# Patient Record
Sex: FEMALE | Race: WHITE | NOT HISPANIC OR LATINO | Employment: UNEMPLOYED | URBAN - METROPOLITAN AREA
[De-identification: names, ages, dates, MRNs, and addresses within clinical notes are randomized per-mention and may not be internally consistent; named-entity substitution may affect disease eponyms.]

---

## 2020-01-16 ENCOUNTER — TELEPHONE (OUTPATIENT)
Dept: FAMILY MEDICINE CLINIC | Facility: CLINIC | Age: 55
End: 2020-01-16

## 2020-01-16 NOTE — TELEPHONE ENCOUNTER
Patient called stating her pharmacy told her that there is a recall on her Ranitidine and this needs to be changed to something else. Please advise

## 2020-02-11 ENCOUNTER — APPOINTMENT (OUTPATIENT)
Dept: CT IMAGING | Facility: HOSPITAL | Age: 55
End: 2020-02-11

## 2020-02-11 ENCOUNTER — HOSPITAL ENCOUNTER (EMERGENCY)
Facility: HOSPITAL | Age: 55
Discharge: HOME | End: 2020-02-11
Attending: EMERGENCY MEDICINE | Admitting: EMERGENCY MEDICINE

## 2020-02-11 VITALS
RESPIRATION RATE: 18 BRPM | WEIGHT: 160 LBS | DIASTOLIC BLOOD PRESSURE: 96 MMHG | SYSTOLIC BLOOD PRESSURE: 151 MMHG | HEART RATE: 86 BPM | OXYGEN SATURATION: 97 % | BODY MASS INDEX: 28.35 KG/M2 | TEMPERATURE: 98.9 F | HEIGHT: 63 IN

## 2020-02-11 DIAGNOSIS — S09.90XA CLOSED HEAD INJURY, INITIAL ENCOUNTER: ICD-10-CM

## 2020-02-11 DIAGNOSIS — S00.83XA CONTUSION OF FOREHEAD, INITIAL ENCOUNTER: Primary | ICD-10-CM
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
## 2020-02-11 PROCEDURE — 70450 CT HEAD/BRAIN W/O DYE: CPT

## 2020-02-11 PROCEDURE — 70486 CT MAXILLOFACIAL W/O DYE: CPT

## 2020-02-11 PROCEDURE — 99282 EMERGENCY DEPT VISIT SF MDM: CPT

## 2020-02-11 PROCEDURE — 72125 CT NECK SPINE W/O DYE: CPT

## 2020-02-11 NOTE — ED NOTES
Patient to er from urgent care with c/o she fell down stairs on Sunday resulting in head injury. No loc reported/ no blood thinners reported. Patient stated she went to urgent care today because increase in swelling and bruising to her face.     Savage Flores, RN  02/11/20 5971

## 2020-02-11 NOTE — ED NOTES
Pt states that she was trying to help mother carry litter down stairs. Pt states that she missed last couple stairs and fell. Pt c/o right knee and facial pain. Reports hitting head on wall. Pt has bilateral periorbital bruising. Pupils perrla. No loc     Marly Avila, RN  02/11/20 4575

## 2020-02-11 NOTE — ED PROVIDER NOTES
EMERGENCY DEPARTMENT ENCOUNTER    Room Number:  42/42  Date of encounter:  2/11/2020  PCP: Vandana Gastelum MD  Historian: Patient      HPI:  Chief Complaint: Fall  A complete HPI/ROS/PMH/PSH/SH/FH are unobtainable due to: None    Context: Holli Patel is a 54 y.o. female who presents to the ED c/o fall.  This occurred on Sunday.  She states that she fell down a couple steps when she tripped.  This is mechanical fall in nature.  She denies any loss of consciousness.  She reports having no associated vomiting or headache.  She states that she has swelling and pain to her forehead.  It is mild.  It is constant.  Improves with time.  She is here today because her friends told her that she should be checked out.  She felt that she was fine.  She then went to urgent care and they referred her to emergency department for CT head imaging.    PAST MEDICAL HISTORY  Active Ambulatory Problems     Diagnosis Date Noted   • No Active Ambulatory Problems     Resolved Ambulatory Problems     Diagnosis Date Noted   • No Resolved Ambulatory Problems     Past Medical History:   Diagnosis Date   • SHAYY (generalized anxiety disorder)    • High blood pressure    • Hyperlipidemia    • Hypothyroidism    • Insomnia    • Migraine    • Rotator cuff tear    • Shoulder pain    • Shoulder pain, right          PAST SURGICAL HISTORY  Past Surgical History:   Procedure Laterality Date   • WRIST SURGERY Right          FAMILY HISTORY  Family History   Problem Relation Age of Onset   • Cancer Father         LYMPHATIC   • Alcohol abuse Brother    • Cancer Other         COLON         SOCIAL HISTORY  Social History     Socioeconomic History   • Marital status: Unknown     Spouse name: Not on file   • Number of children: Not on file   • Years of education: Not on file   • Highest education level: Not on file   Tobacco Use   • Smoking status: Current Every Day Smoker     Packs/day: 1.00     Types: Electronic Cigarette   • Smokeless tobacco: Never Used    Substance and Sexual Activity   • Alcohol use: Never     Frequency: Never   • Drug use: Never         ALLERGIES  Patient has no known allergies.        REVIEW OF SYSTEMS  Review of Systems     All systems reviewed and negative except for those discussed in HPI.       PHYSICAL EXAM    I have reviewed the triage vital signs and nursing notes.    ED Triage Vitals   Temp Heart Rate Resp BP SpO2   02/11/20 1421 02/11/20 1421 02/11/20 1451 02/11/20 1451 02/11/20 1421   98.9 °F (37.2 °C) 93 18 151/96 97 %      Temp src Heart Rate Source Patient Position BP Location FiO2 (%)   02/11/20 1421 -- -- -- --   Tympanic           Physical Exam  GENERAL: not distressed  HENT: nares patent  Bilateral raccoon eyes, no leos sign  No CSF otorrhea or rhinorrhea  EYES: no scleral icterus EOMI  CV: regular rhythm, regular rate  RESPIRATORY: normal effort  ABDOMEN: soft, nontender  MUSCULOSKELETAL: no deformity, no C-spine tenderness, no pain to palpation of her 4 extremities, no T or L-spine tenderness, no chest wall tenderness  NEURO: alert, moves all extremities, follows commands  SKIN: warm, dry        LAB RESULTS  No results found for this or any previous visit (from the past 24 hour(s)).    Ordered the above labs and independently reviewed the results.        RADIOLOGY  Ct Head Without Contrast    Result Date: 2/11/2020  EMERGENCY NONCONTRAST HEAD CT NONCONTRAST FACIAL CT AND NONCONTRAST CERVICAL SPINE CT 02/11/2020  CLINICAL HISTORY: Patient fell, head injury, headache, had facial contusion, has facial and neck pain.  HEAD CT TECHNIQUE: Spiral CT images were obtained from the base of the skull to the vertex without intravenous contrast. Images were reformatted and submitted in 3 mm thick axial CT section with brain algorithm and 2 mm thick axial CT sections with high-resolution bone algorithm and 2 mm thick sagittal and coronal reconstructions were performed and submitted in brain algorithm.  There are no prior head CTs for  comparison.  FINDINGS: The brain parenchyma is normal in attenuation. The ventricles are normal in size. I see no focal mass effect. There is no midline shift. No extra-axial fluid collections are identified. There is no evidence of acute intracranial hemorrhage. There is a scalp hematoma overlying the anterior left frontal bone from today's head trauma.  No underlying acute skull fracture is identified. The calvarium and skull base are normal in appearance.  The paranasal sinuses, mastoid air cells and middle ear cavities are clear.      There is a scalp hematoma over the anterior left frontal bone from today's head trauma.  The remainder of the head CT is normal with no acute skull fracture or intracranial hemorrhage identified.  FACIAL CT TECHNIQUE: Spiral CT images were obtained through the facial bones in the axial imaging plane. Images were reformatted and submitted in 2 mm thick axial CT sections with soft tissue algorithm as well as 1 mm thick axial, sagittal and coronal CT sections with high-resolution bone algorithm.  FINDINGS: Reidentified is a scalp hematoma over the anterior inferior left frontal bone from today's head and facial trauma. Some swelling and hematoma in the left periorbital region. The paranasal sinuses are clear. The orbits are normal in appearance. No acute facial fracture is identified.  IMPRESSION:  Scalp hematoma over the anterior-inferior left frontal bone from today's head and facial trauma, otherwise negative facial CT. Specifically, no acute facial fracture is identified.  CERVICAL SPINE CT TECHNIQUE: Spiral CT images were obtained from the skull base down to the T2 thoracic level. Images were reformatted and submitted in 2 mm thick axial and sagittal CT section with soft tissue algorithm, 1 mm thick axial, sagittal and coronal CT sections with high-resolution bone algorithm.  FINDINGS: There are arthritic changes at the atlantodental interval. Otherwise, the C1-2 level is normal  in appearance. The atlantooccipital articulation is normal in appearance.  At C2-3 there is mild left facet overgrowth.  The disc space, right facets and uncovertebral joints are normal.  There is minimal left foraminal narrowing.  There is no central canal or right foraminal narrowing.  At C3-4, there is a broad-based posterior central disc bulge. The facets and uncovertebral joints are normal. There is minimal canal and no foraminal narrowing.  At C4-5 the disc space, facets and uncovertebral joints are normal with no canal or foraminal narrowing.  At C5-6 the disc space, facets and uncovertebral joints are normal with no canal or foraminal narrowing.  At C6-7 the disc space, facets and uncovertebral joints are normal with no canal or foraminal narrowing.  At C7-T1 the disc spaces and facets are normal with no canal or foraminal narrowing.  No acute fracture is seen in the cervical spine.  IMPRESSION:  No acute fracture is seen in the cervical spine. There is very minimal cervical spondylosis with minimal posterior disc bulge at C3-4 and minimal canal narrowing.  There is mild left facet overgrowth with mild left bony foraminal narrowing at C2-3; otherwise, no cervical disc herniation and no significant cervical canal or foraminal narrowing is seen.  Radiation dose reduction techniques were utilized, including automated exposure control and exposure modulation based on body size.       Ct Cervical Spine Without Contrast    Result Date: 2/11/2020  EMERGENCY NONCONTRAST HEAD CT NONCONTRAST FACIAL CT AND NONCONTRAST CERVICAL SPINE CT 02/11/2020  CLINICAL HISTORY: Patient fell, head injury, headache, had facial contusion, has facial and neck pain.  HEAD CT TECHNIQUE: Spiral CT images were obtained from the base of the skull to the vertex without intravenous contrast. Images were reformatted and submitted in 3 mm thick axial CT section with brain algorithm and 2 mm thick axial CT sections with high-resolution bone  algorithm and 2 mm thick sagittal and coronal reconstructions were performed and submitted in brain algorithm.  There are no prior head CTs for comparison.  FINDINGS: The brain parenchyma is normal in attenuation. The ventricles are normal in size. I see no focal mass effect. There is no midline shift. No extra-axial fluid collections are identified. There is no evidence of acute intracranial hemorrhage. There is a scalp hematoma overlying the anterior left frontal bone from today's head trauma.  No underlying acute skull fracture is identified. The calvarium and skull base are normal in appearance.  The paranasal sinuses, mastoid air cells and middle ear cavities are clear.      There is a scalp hematoma over the anterior left frontal bone from today's head trauma.  The remainder of the head CT is normal with no acute skull fracture or intracranial hemorrhage identified.  FACIAL CT TECHNIQUE: Spiral CT images were obtained through the facial bones in the axial imaging plane. Images were reformatted and submitted in 2 mm thick axial CT sections with soft tissue algorithm as well as 1 mm thick axial, sagittal and coronal CT sections with high-resolution bone algorithm.  FINDINGS: Reidentified is a scalp hematoma over the anterior inferior left frontal bone from today's head and facial trauma. Some swelling and hematoma in the left periorbital region. The paranasal sinuses are clear. The orbits are normal in appearance. No acute facial fracture is identified.  IMPRESSION:  Scalp hematoma over the anterior-inferior left frontal bone from today's head and facial trauma, otherwise negative facial CT. Specifically, no acute facial fracture is identified.  CERVICAL SPINE CT TECHNIQUE: Spiral CT images were obtained from the skull base down to the T2 thoracic level. Images were reformatted and submitted in 2 mm thick axial and sagittal CT section with soft tissue algorithm, 1 mm thick axial, sagittal and coronal CT sections  with high-resolution bone algorithm.  FINDINGS: There are arthritic changes at the atlantodental interval. Otherwise, the C1-2 level is normal in appearance. The atlantooccipital articulation is normal in appearance.  At C2-3 there is mild left facet overgrowth.  The disc space, right facets and uncovertebral joints are normal.  There is minimal left foraminal narrowing.  There is no central canal or right foraminal narrowing.  At C3-4, there is a broad-based posterior central disc bulge. The facets and uncovertebral joints are normal. There is minimal canal and no foraminal narrowing.  At C4-5 the disc space, facets and uncovertebral joints are normal with no canal or foraminal narrowing.  At C5-6 the disc space, facets and uncovertebral joints are normal with no canal or foraminal narrowing.  At C6-7 the disc space, facets and uncovertebral joints are normal with no canal or foraminal narrowing.  At C7-T1 the disc spaces and facets are normal with no canal or foraminal narrowing.  No acute fracture is seen in the cervical spine.  IMPRESSION:  No acute fracture is seen in the cervical spine. There is very minimal cervical spondylosis with minimal posterior disc bulge at C3-4 and minimal canal narrowing.  There is mild left facet overgrowth with mild left bony foraminal narrowing at C2-3; otherwise, no cervical disc herniation and no significant cervical canal or foraminal narrowing is seen.  Radiation dose reduction techniques were utilized, including automated exposure control and exposure modulation based on body size.       Ct Facial Bones Without Contrast    Result Date: 2/11/2020  EMERGENCY NONCONTRAST HEAD CT NONCONTRAST FACIAL CT AND NONCONTRAST CERVICAL SPINE CT 02/11/2020  CLINICAL HISTORY: Patient fell, head injury, headache, had facial contusion, has facial and neck pain.  HEAD CT TECHNIQUE: Spiral CT images were obtained from the base of the skull to the vertex without intravenous contrast. Images were  reformatted and submitted in 3 mm thick axial CT section with brain algorithm and 2 mm thick axial CT sections with high-resolution bone algorithm and 2 mm thick sagittal and coronal reconstructions were performed and submitted in brain algorithm.  There are no prior head CTs for comparison.  FINDINGS: The brain parenchyma is normal in attenuation. The ventricles are normal in size. I see no focal mass effect. There is no midline shift. No extra-axial fluid collections are identified. There is no evidence of acute intracranial hemorrhage. There is a scalp hematoma overlying the anterior left frontal bone from today's head trauma.  No underlying acute skull fracture is identified. The calvarium and skull base are normal in appearance.  The paranasal sinuses, mastoid air cells and middle ear cavities are clear.      There is a scalp hematoma over the anterior left frontal bone from today's head trauma.  The remainder of the head CT is normal with no acute skull fracture or intracranial hemorrhage identified.  FACIAL CT TECHNIQUE: Spiral CT images were obtained through the facial bones in the axial imaging plane. Images were reformatted and submitted in 2 mm thick axial CT sections with soft tissue algorithm as well as 1 mm thick axial, sagittal and coronal CT sections with high-resolution bone algorithm.  FINDINGS: Reidentified is a scalp hematoma over the anterior inferior left frontal bone from today's head and facial trauma. Some swelling and hematoma in the left periorbital region. The paranasal sinuses are clear. The orbits are normal in appearance. No acute facial fracture is identified.  IMPRESSION:  Scalp hematoma over the anterior-inferior left frontal bone from today's head and facial trauma, otherwise negative facial CT. Specifically, no acute facial fracture is identified.  CERVICAL SPINE CT TECHNIQUE: Spiral CT images were obtained from the skull base down to the T2 thoracic level. Images were reformatted  and submitted in 2 mm thick axial and sagittal CT section with soft tissue algorithm, 1 mm thick axial, sagittal and coronal CT sections with high-resolution bone algorithm.  FINDINGS: There are arthritic changes at the atlantodental interval. Otherwise, the C1-2 level is normal in appearance. The atlantooccipital articulation is normal in appearance.  At C2-3 there is mild left facet overgrowth.  The disc space, right facets and uncovertebral joints are normal.  There is minimal left foraminal narrowing.  There is no central canal or right foraminal narrowing.  At C3-4, there is a broad-based posterior central disc bulge. The facets and uncovertebral joints are normal. There is minimal canal and no foraminal narrowing.  At C4-5 the disc space, facets and uncovertebral joints are normal with no canal or foraminal narrowing.  At C5-6 the disc space, facets and uncovertebral joints are normal with no canal or foraminal narrowing.  At C6-7 the disc space, facets and uncovertebral joints are normal with no canal or foraminal narrowing.  At C7-T1 the disc spaces and facets are normal with no canal or foraminal narrowing.  No acute fracture is seen in the cervical spine.  IMPRESSION:  No acute fracture is seen in the cervical spine. There is very minimal cervical spondylosis with minimal posterior disc bulge at C3-4 and minimal canal narrowing.  There is mild left facet overgrowth with mild left bony foraminal narrowing at C2-3; otherwise, no cervical disc herniation and no significant cervical canal or foraminal narrowing is seen.  Radiation dose reduction techniques were utilized, including automated exposure control and exposure modulation based on body size.         I ordered the above noted radiological studies. Reviewed by me and discussed with radiologist.  See dictation for official radiology interpretation.      PROCEDURES    Procedures      MEDICATIONS GIVEN IN ER    Medications - No data to  display      PROGRESS, DATA ANALYSIS, CONSULTS, AND MEDICAL DECISION MAKING    All labs have been independently reviewed by me.  All radiology studies have been reviewed by me and discussed with radiologist dictating the report.   EKG's independently viewed and interpreted by me.  Discussion below represents my analysis of pertinent findings related to patient's condition, differential diagnosis, treatment plan and final disposition.    Differential diagnosis includes fracture, concussion, cervical spine fracture, intracranial hemorrhage.    CT head interpreted by myself shows no evidence of intracranial hemorrhage.    Per communication with Dr. Fierro, radiology, patient has no acute intra-cranial pathology or cervical pathology.         Patient denies having pain elsewhere to her body.    AS OF 5:23 PM VITALS:    BP - 151/96  HR - 86  TEMP - 98.9 °F (37.2 °C) (Tympanic)  O2 SATS - 97%        DIAGNOSIS  Final diagnoses:   Contusion of forehead, initial encounter   Fall, initial encounter   Closed head injury, initial encounter         DISPOSITION  DISCHARGE    FOLLOW-UP  Vandana Gastelum MD  [address] [id number] [phone]    Schedule an appointment as soon as possible for a visit   As needed         Medication List      No changes were made to your prescriptions during this visit.                Nilesh Gonzalez II, MD  02/11/20 3102

## 2020-02-21 RX ORDER — RANITIDINE 150 MG/1
150 TABLET ORAL 2 TIMES DAILY
Qty: 60 TABLET | Refills: 3 | Status: SHIPPED | OUTPATIENT
Start: 2020-02-21 | End: 2020-03-02 | Stop reason: SDUPTHER

## 2020-03-02 ENCOUNTER — OFFICE VISIT (OUTPATIENT)
Dept: FAMILY MEDICINE CLINIC | Facility: CLINIC | Age: 55
End: 2020-03-02

## 2020-03-02 VITALS
HEART RATE: 83 BPM | TEMPERATURE: 97.9 F | OXYGEN SATURATION: 96 % | HEIGHT: 63 IN | BODY MASS INDEX: 28.35 KG/M2 | SYSTOLIC BLOOD PRESSURE: 122 MMHG | WEIGHT: 160 LBS | DIASTOLIC BLOOD PRESSURE: 80 MMHG

## 2020-03-02 DIAGNOSIS — Z87.19 HISTORY OF GASTROESOPHAGEAL REFLUX (GERD): ICD-10-CM

## 2020-03-02 DIAGNOSIS — E03.9 ACQUIRED HYPOTHYROIDISM: ICD-10-CM

## 2020-03-02 DIAGNOSIS — M19.90 ARTHRITIS: ICD-10-CM

## 2020-03-02 DIAGNOSIS — E78.2 MIXED HYPERLIPIDEMIA: ICD-10-CM

## 2020-03-02 DIAGNOSIS — F51.01 PRIMARY INSOMNIA: ICD-10-CM

## 2020-03-02 DIAGNOSIS — F41.9 ANXIETY: Primary | ICD-10-CM

## 2020-03-02 PROBLEM — F41.1 GAD (GENERALIZED ANXIETY DISORDER): Status: ACTIVE | Noted: 2020-03-02

## 2020-03-02 PROCEDURE — 99214 OFFICE O/P EST MOD 30 MIN: CPT | Performed by: FAMILY MEDICINE

## 2020-03-02 RX ORDER — ALPRAZOLAM 0.5 MG/1
0.5 TABLET ORAL DAILY
Qty: 30 TABLET | Refills: 2 | Status: SHIPPED | OUTPATIENT
Start: 2020-03-02 | End: 2020-06-03

## 2020-03-02 RX ORDER — IBUPROFEN 800 MG/1
800 TABLET ORAL 3 TIMES DAILY PRN
COMMUNITY
Start: 2020-01-26 | End: 2020-03-02 | Stop reason: SDUPTHER

## 2020-03-02 RX ORDER — SIMVASTATIN 20 MG
20 TABLET ORAL DAILY
Qty: 90 TABLET | Refills: 1 | Status: SHIPPED | OUTPATIENT
Start: 2020-03-02 | End: 2020-06-09 | Stop reason: SDUPTHER

## 2020-03-02 RX ORDER — SIMVASTATIN 20 MG
20 TABLET ORAL DAILY
COMMUNITY
Start: 2020-02-21 | End: 2020-03-02 | Stop reason: SDUPTHER

## 2020-03-02 RX ORDER — LEVOTHYROXINE SODIUM 0.03 MG/1
25 TABLET ORAL DAILY
Qty: 90 TABLET | Refills: 1 | Status: SHIPPED | OUTPATIENT
Start: 2020-03-02 | End: 2020-06-09 | Stop reason: SDUPTHER

## 2020-03-02 RX ORDER — FLUOXETINE HYDROCHLORIDE 40 MG/1
40 CAPSULE ORAL DAILY
Qty: 90 CAPSULE | Refills: 1 | Status: SHIPPED | OUTPATIENT
Start: 2020-03-02 | End: 2020-05-22 | Stop reason: SDUPTHER

## 2020-03-02 RX ORDER — ALPRAZOLAM 0.5 MG/1
0.5 TABLET ORAL DAILY
COMMUNITY
Start: 2020-01-27 | End: 2020-03-02 | Stop reason: SDUPTHER

## 2020-03-02 RX ORDER — LEVOTHYROXINE SODIUM 0.03 MG/1
25 TABLET ORAL DAILY
COMMUNITY
Start: 2020-01-19 | End: 2020-03-02 | Stop reason: SDUPTHER

## 2020-03-02 RX ORDER — HYDROXYZINE PAMOATE 25 MG/1
25 CAPSULE ORAL 3 TIMES DAILY PRN
Qty: 60 CAPSULE | Refills: 5 | Status: SHIPPED | OUTPATIENT
Start: 2020-03-02 | End: 2021-03-15 | Stop reason: SDUPTHER

## 2020-03-02 RX ORDER — FLUOXETINE HYDROCHLORIDE 40 MG/1
40 CAPSULE ORAL DAILY
COMMUNITY
Start: 2020-02-25 | End: 2020-03-02 | Stop reason: SDUPTHER

## 2020-03-02 RX ORDER — IBUPROFEN 800 MG/1
800 TABLET ORAL 3 TIMES DAILY PRN
Qty: 270 TABLET | Refills: 1 | Status: SHIPPED | OUTPATIENT
Start: 2020-03-02 | End: 2020-06-09 | Stop reason: SDUPTHER

## 2020-03-02 RX ORDER — RANITIDINE 150 MG/1
150 TABLET ORAL 2 TIMES DAILY
Qty: 180 TABLET | Refills: 1 | Status: SHIPPED | OUTPATIENT
Start: 2020-03-02 | End: 2020-06-09 | Stop reason: SINTOL

## 2020-03-02 NOTE — PROGRESS NOTES
Subjective   Holli Patel is a 54 y.o. female.     Vitals:    03/02/20 1323   BP: 122/80   Pulse: 83   Temp: 97.9 °F (36.6 °C)   SpO2: 96%        Chief Complaint   Patient presents with   • Anxiety     Establish care at [facility]   • Hyperlipidemia   • Hypothyroidism        History of Present Illness    3-month follow-up on anxiety, insomnia, arthritis, hypothyroidism, and hyperlipidemia    Last office visit 11/2019 at [place] at that time patient's labs were done and no medication changes were made.    Unfortunately, her anxiety and insomnia have both been worse in recent months due to her mother's poor health and dementia.  She has weaned herself down from Xanax 1 mg nightly to a half a milligram nightly however at times feels that this is not helping and she often needs to take either Benadryl or extra Tylenol PM.  Last office visit we did increase her Prozac from 20 mg to 40 mg, this is helping somewhat but still feels her biggest problem is sleep.  She states she is always been an anxious and hyper person however at bed she just cannot turn off her mind.  Patient is very well-known to me.  We have tried numerous medications for her anxiety and insomnia in the past, such as; BuSpar, trazodone, melatonin, and Cymbalta.  All of which she had a hard time tolerating the side effects.    Her arthritis, particularly with her right shoulder has been controlled with ibuprofen.  Needs a refill    Her hyperlipidemia has been well controlled with Zocor.  Labs were just checked November 2019 and were within normal limits.  Needs a refill today.    Her hypothyroidism has been well controlled with Synthroid, labs checked November 2019, needs a refill today    The following portions of the patient's history were reviewed and updated as appropriate: allergies, current medications, past family history, past medical history, past social history, past surgical history and problem list.    Review of Systems   Respiratory: Negative  for shortness of breath.    Cardiovascular: Negative for chest pain.   I have reviewed and confirmed the accuracy of the ROS as documented by the MA/LPN/RN Vandana Gastelum MD      Objective   Physical Exam   Constitutional: She appears well-developed and well-nourished.   HENT:   Head: Normocephalic and atraumatic.   Eyes: Pupils are equal, round, and reactive to light. Conjunctivae are normal.   Neck: Normal range of motion. Neck supple. No thyromegaly present.   Cardiovascular: Normal rate, regular rhythm and normal heart sounds.   Pulmonary/Chest: Effort normal and breath sounds normal.   Abdominal: Soft. Bowel sounds are normal. She exhibits no distension. There is no hepatosplenomegaly. There is no tenderness.   Musculoskeletal: She exhibits no edema.   Lymphadenopathy:     She has no cervical adenopathy.   Psychiatric: She has a normal mood and affect. Her behavior is normal. Judgment and thought content normal.   Nursing note and vitals reviewed.       LABS/STUDIES  --11/2019 CBC, CMP, lipids, TSH within normal limits    Procedures     Assessment/Plan   Holli was seen today for anxiety, hyperlipidemia and hypothyroidism.    Diagnoses and all orders for this visit:    Anxiety --uncontrolled, will add Vistaril 25 mg 1 p.o. 3 times daily as needed, refill Xanax with no changes 0.5 mg 1 p.o. nightly refill x2  -     ALPRAZolam (XANAX) 0.5 MG tablet; Take 1 tablet by mouth Daily.    Primary insomnia  --uncontrolled, add Vistaril nightly.  Encourage increasing cardio exercise greater than 150 minutes weekly    History of gastroesophageal reflux (GERD)  --stable, refill Zantac 150 mg twice daily    Mixed hyperlipidemia  -stable, refill Zocor 20 mg 1 p.o. daily    Acquired hypothyroidism --stable, refill Synthroid 25 mcg 1 p.o. every morning    Arthritis  -stable, refill ibuprofen 800 mg 1 p.o. 3 times daily as needed    Other orders  -     FLUoxetine (PROzac) 40 MG capsule; Take 1 capsule by mouth Daily.  -      ibuprofen (ADVIL,MOTRIN) 800 MG tablet; Take 1 tablet by mouth 3 (Three) Times a Day As Needed for Headache.  -     levothyroxine (SYNTHROID, LEVOTHROID) 25 MCG tablet; Take 1 tablet by mouth Daily.  -     raNITIdine (ZANTAC) 150 MG tablet; Take 1 tablet by mouth 2 (Two) Times a Day.  -     simvastatin (ZOCOR) 20 MG tablet; Take 1 tablet by mouth Daily.  -     hydrOXYzine pamoate (VISTARIL) 25 MG capsule; Take 1 capsule by mouth 3 (Three) Times a Day As Needed for Itching.                 Return in about 3 months (around 6/2/2020).

## 2020-03-02 NOTE — PATIENT INSTRUCTIONS
Recommend low fat/low calorie diet and exercise greater than 150 minutes of cardio per week.   Continue current treatment plan.  Add vistaril at bed

## 2020-05-22 RX ORDER — FLUOXETINE HYDROCHLORIDE 40 MG/1
40 CAPSULE ORAL DAILY
Qty: 90 CAPSULE | Refills: 1 | Status: SHIPPED | OUTPATIENT
Start: 2020-05-22 | End: 2020-06-09 | Stop reason: SDUPTHER

## 2020-05-30 DIAGNOSIS — F41.9 ANXIETY: ICD-10-CM

## 2020-06-03 RX ORDER — RIZATRIPTAN BENZOATE 10 MG/1
10 TABLET ORAL ONCE AS NEEDED
Qty: 10 TABLET | Refills: 1 | Status: SHIPPED | OUTPATIENT
Start: 2020-06-03 | End: 2020-06-05 | Stop reason: SDUPTHER

## 2020-06-03 RX ORDER — ALPRAZOLAM 0.5 MG/1
0.5 TABLET ORAL DAILY
Qty: 10 TABLET | Refills: 0 | Status: SHIPPED | OUTPATIENT
Start: 2020-06-03 | End: 2020-06-05 | Stop reason: SDUPTHER

## 2020-06-03 NOTE — TELEPHONE ENCOUNTER
Pharmacy states this is take one tablet by mouth at the onset of headache, may repeat one tablet in 2 hours if needed

## 2020-06-05 DIAGNOSIS — F41.9 ANXIETY: ICD-10-CM

## 2020-06-05 RX ORDER — RIZATRIPTAN BENZOATE 10 MG/1
10 TABLET ORAL ONCE AS NEEDED
Qty: 10 TABLET | Refills: 1 | Status: SHIPPED | OUTPATIENT
Start: 2020-06-05 | End: 2020-06-09 | Stop reason: SDUPTHER

## 2020-06-05 RX ORDER — ALPRAZOLAM 0.5 MG/1
0.5 TABLET ORAL DAILY
Qty: 10 TABLET | Refills: 0 | Status: SHIPPED | OUTPATIENT
Start: 2020-06-05 | End: 2020-06-09 | Stop reason: SDUPTHER

## 2020-06-05 RX ORDER — RIZATRIPTAN BENZOATE 10 MG/1
10 TABLET ORAL ONCE AS NEEDED
Qty: 10 TABLET | Refills: 1 | Status: SHIPPED | OUTPATIENT
Start: 2020-06-05 | End: 2020-06-05 | Stop reason: SDUPTHER
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
## 2020-06-09 ENCOUNTER — OFFICE VISIT (OUTPATIENT)
Dept: FAMILY MEDICINE CLINIC | Facility: CLINIC | Age: 55
End: 2020-06-09

## 2020-06-09 VITALS
WEIGHT: 160 LBS | HEART RATE: 82 BPM | DIASTOLIC BLOOD PRESSURE: 78 MMHG | OXYGEN SATURATION: 98 % | HEIGHT: 63 IN | BODY MASS INDEX: 28.35 KG/M2 | SYSTOLIC BLOOD PRESSURE: 134 MMHG | TEMPERATURE: 96.2 F

## 2020-06-09 DIAGNOSIS — M15.9 PRIMARY OSTEOARTHRITIS INVOLVING MULTIPLE JOINTS: ICD-10-CM

## 2020-06-09 DIAGNOSIS — K21.9 GASTROESOPHAGEAL REFLUX DISEASE WITHOUT ESOPHAGITIS: ICD-10-CM

## 2020-06-09 DIAGNOSIS — E03.8 OTHER SPECIFIED HYPOTHYROIDISM: ICD-10-CM

## 2020-06-09 DIAGNOSIS — Z87.19 HISTORY OF GASTROESOPHAGEAL REFLUX (GERD): ICD-10-CM

## 2020-06-09 DIAGNOSIS — F41.9 ANXIETY: Primary | ICD-10-CM

## 2020-06-09 DIAGNOSIS — E78.2 MIXED HYPERLIPIDEMIA: ICD-10-CM

## 2020-06-09 DIAGNOSIS — Z00.00 ROUTINE HEALTH MAINTENANCE: ICD-10-CM

## 2020-06-09 PROCEDURE — 99214 OFFICE O/P EST MOD 30 MIN: CPT | Performed by: FAMILY MEDICINE

## 2020-06-09 RX ORDER — ALPRAZOLAM 0.5 MG/1
0.5 TABLET ORAL DAILY
Qty: 30 TABLET | Refills: 2 | Status: SHIPPED | OUTPATIENT
Start: 2020-06-09 | End: 2020-09-14 | Stop reason: SDUPTHER

## 2020-06-09 RX ORDER — FLUOXETINE HYDROCHLORIDE 40 MG/1
40 CAPSULE ORAL DAILY
Qty: 90 CAPSULE | Refills: 1 | Status: SHIPPED | OUTPATIENT
Start: 2020-06-09 | End: 2020-11-24 | Stop reason: SDUPTHER

## 2020-06-09 RX ORDER — LEVOTHYROXINE SODIUM 0.03 MG/1
25 TABLET ORAL DAILY
Qty: 90 TABLET | Refills: 1 | Status: SHIPPED | OUTPATIENT
Start: 2020-06-09 | End: 2021-03-15 | Stop reason: SDUPTHER

## 2020-06-09 RX ORDER — SIMVASTATIN 20 MG
20 TABLET ORAL DAILY
Qty: 90 TABLET | Refills: 1 | Status: SHIPPED | OUTPATIENT
Start: 2020-06-09 | End: 2020-12-30

## 2020-06-09 RX ORDER — IBUPROFEN 800 MG/1
800 TABLET ORAL 3 TIMES DAILY PRN
Qty: 270 TABLET | Refills: 1 | Status: SHIPPED | OUTPATIENT
Start: 2020-06-09 | End: 2020-11-24 | Stop reason: SDUPTHER

## 2020-06-09 RX ORDER — RIZATRIPTAN BENZOATE 10 MG/1
10 TABLET ORAL ONCE AS NEEDED
Qty: 18 TABLET | Refills: 1 | Status: SHIPPED | OUTPATIENT
Start: 2020-06-09 | End: 2020-08-05

## 2020-06-09 RX ORDER — OMEPRAZOLE 20 MG/1
20 CAPSULE, DELAYED RELEASE ORAL DAILY
Qty: 90 CAPSULE | Refills: 1 | Status: SHIPPED | OUTPATIENT
Start: 2020-06-09 | End: 2020-11-24 | Stop reason: SDUPTHER
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
## 2020-06-09 NOTE — PATIENT INSTRUCTIONS
Recommend low fat/low calorie diet and exercise greater than 150 minutes of cardio per week.   Continue current treatment plan.

## 2020-06-10 LAB
ALBUMIN SERPL-MCNC: 4.6 G/DL (ref 3.5–5.2)
ALBUMIN/GLOB SERPL: 1.6 G/DL
ALP SERPL-CCNC: 90 U/L (ref 39–117)
ALT SERPL-CCNC: 14 U/L (ref 1–33)
AST SERPL-CCNC: 10 U/L (ref 1–32)
BILIRUB SERPL-MCNC: <0.2 MG/DL (ref 0.2–1.2)
BUN SERPL-MCNC: 10 MG/DL (ref 6–20)
BUN/CREAT SERPL: 12.2 (ref 7–25)
CALCIUM SERPL-MCNC: 9.6 MG/DL (ref 8.6–10.5)
CHLORIDE SERPL-SCNC: 101 MMOL/L (ref 98–107)
CHOLEST SERPL-MCNC: 212 MG/DL (ref 0–200)
CO2 SERPL-SCNC: 27.6 MMOL/L (ref 22–29)
CREAT SERPL-MCNC: 0.82 MG/DL (ref 0.57–1)
GLOBULIN SER CALC-MCNC: 2.8 GM/DL
GLUCOSE SERPL-MCNC: 97 MG/DL (ref 65–99)
HCV AB S/CO SERPL IA: 0.2 S/CO RATIO (ref 0–0.9)
HDLC SERPL-MCNC: 63 MG/DL (ref 40–60)
LDLC SERPL CALC-MCNC: 114 MG/DL (ref 0–100)
LDLC/HDLC SERPL: 1.81 {RATIO}
POTASSIUM SERPL-SCNC: 4.9 MMOL/L (ref 3.5–5.2)
PROT SERPL-MCNC: 7.4 G/DL (ref 6–8.5)
SODIUM SERPL-SCNC: 139 MMOL/L (ref 136–145)
TRIGL SERPL-MCNC: 176 MG/DL (ref 0–150)
TSH SERPL DL<=0.005 MIU/L-ACNC: 2.76 UIU/ML (ref 0.27–4.2)
VLDLC SERPL CALC-MCNC: 35.2 MG/DL

## 2020-08-05 RX ORDER — RIZATRIPTAN BENZOATE 10 MG/1
TABLET ORAL
Qty: 18 TABLET | Refills: 0 | Status: SHIPPED | OUTPATIENT
Start: 2020-08-05 | End: 2020-08-31

## 2020-08-31 RX ORDER — RIZATRIPTAN BENZOATE 10 MG/1
TABLET ORAL
Qty: 18 TABLET | Refills: 0 | Status: SHIPPED | OUTPATIENT
Start: 2020-08-31 | End: 2020-09-14 | Stop reason: SDUPTHER

## 2020-09-10 DIAGNOSIS — F41.9 ANXIETY: ICD-10-CM

## 2020-09-12 RX ORDER — RIZATRIPTAN BENZOATE 10 MG/1
TABLET ORAL
Qty: 12 TABLET | Refills: 0 | OUTPATIENT
Start: 2020-09-12

## 2020-09-12 RX ORDER — ALPRAZOLAM 0.5 MG/1
TABLET ORAL
Qty: 30 TABLET | Refills: 1 | OUTPATIENT
Start: 2020-09-12

## 2020-09-14 DIAGNOSIS — F41.9 ANXIETY: ICD-10-CM

## 2020-09-14 RX ORDER — RIZATRIPTAN BENZOATE 10 MG/1
10 TABLET ORAL ONCE AS NEEDED
Qty: 9 TABLET | Refills: 0 | Status: SHIPPED | OUTPATIENT
Start: 2020-09-14 | End: 2020-09-29 | Stop reason: SDUPTHER

## 2020-09-14 RX ORDER — ALPRAZOLAM 0.5 MG/1
0.5 TABLET ORAL DAILY
Qty: 15 TABLET | Refills: 0 | Status: SHIPPED | OUTPATIENT
Start: 2020-09-14 | End: 2020-09-29 | Stop reason: SDUPTHER

## 2020-09-14 NOTE — TELEPHONE ENCOUNTER
Caller: Holli Patel    Relationship: Self    Best call back number: [phone]    Medication needed:   Requested Prescriptions     Pending Prescriptions Disp Refills   • ALPRAZolam (XANAX) 0.5 MG tablet 30 tablet 2     Sig: Take 1 tablet by mouth Daily.   • rizatriptan (MAXALT) 10 MG tablet 18 tablet 0     Sig: May repeat in 2 hours if needed       When do you need the refill by: 9/14/2020    What details did the patient provide when requesting the medication: Patient calling and states she ran out of these medications and Asaf advised they were denied. She has an appointment scheduled for 9/29/2020 with Dr Gastelum and would like a refill to get her thru until this date.    Does the patient have less than a 3 day supply:  [x] Yes  [] No    What is the patient's preferred pharmacy: [facility] [address] [facility] AT [facility] & [address] [facility] [phone] [facility] [phone] FX

## 2020-09-29 ENCOUNTER — TELEMEDICINE (OUTPATIENT)
Dept: FAMILY MEDICINE CLINIC | Facility: CLINIC | Age: 55
End: 2020-09-29

## 2020-09-29 VITALS — BODY MASS INDEX: 28.35 KG/M2 | WEIGHT: 160 LBS | HEIGHT: 63 IN

## 2020-09-29 DIAGNOSIS — F41.9 ANXIETY: Primary | ICD-10-CM

## 2020-09-29 DIAGNOSIS — Z86.69 HISTORY OF MIGRAINE: ICD-10-CM

## 2020-09-29 DIAGNOSIS — F51.01 PRIMARY INSOMNIA: ICD-10-CM

## 2020-09-29 PROBLEM — Z00.00 ROUTINE HEALTH MAINTENANCE: Status: RESOLVED | Noted: 2020-06-09 | Resolved: 2020-09-29

## 2020-09-29 PROBLEM — G43.119 INTRACTABLE MIGRAINE WITH AURA WITHOUT STATUS MIGRAINOSUS: Status: ACTIVE | Noted: 2020-09-29

## 2020-09-29 PROCEDURE — 99214 OFFICE O/P EST MOD 30 MIN: CPT | Performed by: FAMILY MEDICINE

## 2020-09-29 RX ORDER — ALPRAZOLAM 0.5 MG/1
0.5 TABLET ORAL NIGHTLY PRN
Qty: 30 TABLET | Refills: 1 | Status: SHIPPED | OUTPATIENT
Start: 2020-09-29 | End: 2020-11-24 | Stop reason: SDUPTHER

## 2020-09-29 RX ORDER — RIZATRIPTAN BENZOATE 10 MG/1
10 TABLET ORAL ONCE AS NEEDED
Qty: 15 TABLET | Refills: 5 | Status: SHIPPED | OUTPATIENT
Start: 2020-09-29 | End: 2021-01-21

## 2020-09-29 RX ORDER — PROPRANOLOL HCL 60 MG
60 CAPSULE, EXTENDED RELEASE 24HR ORAL DAILY
Qty: 30 CAPSULE | Refills: 5 | Status: SHIPPED | OUTPATIENT
Start: 2020-09-29 | End: 2021-03-15 | Stop reason: SDUPTHER

## 2020-09-29 NOTE — PROGRESS NOTES
Subjective   Holli Patel is a 55 y.o. female.     There were no vitals filed for this visit.     Chief Complaint   Patient presents with   • Anxiety     PATIENT IS FOLLOWING UP ON ANXXIETY , NEEDS REFILL ON XANAX.    • Headache   • Insomnia        History of Present Illness    This appointment was converted to a video visit in accordance with CDC recommendations and guidelines given the current coronavirus pandemic (COVID testing is pending on this patient at this time).  Due to technical difficulties the video visit had to be converted to a telephone visit midway through patient visit.    3-month follow-up for chronic anxiety, insomnia, and migraines    LOV with me in June for medication refills and labs.  No changes made.    Doing okay; however, currently has a COVID-19 testing pending.  Apparently her granddaughter tested positive for COVID-19 last week and Marcelle went for testing 2 days ago, pending result at this time.  Currently asymptomatic.  Denies fever, nausea, vomiting, shortness of air, loss of taste or smell.    Chronic anxiety has been fairly well controlled with a combination of Prozac 40 mg daily and as needed Vistaril.  Works in the [employer]/, accommodating to new social and mandated guidelines.    Chronic insomnia has been well controlled with Xanax 0.5 mg 1 p.o. nightly.  Last urine tox screen for compliance February 2019 and consistent with prescription history.  Star reviewed by me today shows Xanax 0.5 mg a quantity of 30 last refilled on August 10, 2020, then a quantity of 15 was prescribed on September 15, 2020 in order to hold over until today's appointment.    Migraines have been uncontrolled in the last 3 to 4 months.  She believes this is related to stress.  Has been taking extra Maxalt, which does work well.  Often needs 2 Maxalt more than 10 days/month.  No nighttime awakening, but may wake up with a headache.  Associated with photophobia, nausea.  They are  relieved with Maxalt.  Of note, had a CT of brain approximately 7 to 8 months ago after a fall, which was within normal limits.  Tried in the past on Topamax for preventative purposes; however, this had unwanted side effects.  Previous blood pressures have always been within normal limits or borderline high.      The following portions of the patient's history were reviewed and updated as appropriate: allergies, current medications, past family history, past medical history, past social history, past surgical history and problem list.    Review of Systems   Constitutional: Negative for fatigue, fever, unexpected weight gain and unexpected weight loss.   Respiratory: Negative for shortness of breath.    Cardiovascular: Negative for chest pain.   Neurological: Positive for headache. Negative for seizures and syncope.   Psychiatric/Behavioral: Positive for sleep disturbance and stress.       Objective   Physical Exam  Vitals signs and nursing note reviewed.   Constitutional:       General: She is not in acute distress.     Appearance: She is well-developed.   HENT:      Head: Normocephalic and atraumatic.   Pulmonary:      Effort: Pulmonary effort is normal.   Neurological:      Mental Status: She is alert and oriented to person, place, and time.   Psychiatric:         Behavior: Behavior normal.         Thought Content: Thought content normal.         Judgment: Judgment normal.          LABS/STUDIES --June 2020--, normal TSH, normal CMP, normal CBC    Procedures     Assessment/Plan   Holli was seen today for anxiety, headache and insomnia.    Diagnoses and all orders for this visit:    Anxiety stable.  Star reviewed.  No change in prescription.  Continue both Prozac 40 mg 1 p.o. daily and Vistaril 25 mg every 8 hours as needed anxiety, will refill both upon request.  -     ALPRAZolam (XANAX) 0.5 MG tablet; Take 1 tablet by mouth At Night As Needed for Anxiety.    Primary insomnia stable.  Star reviewed.  No  change in medication.  Refill Xanax 0.5 mg 1 p.o. nightly for sleep, with 2 refills    History of migraine uncontrolled.  Likely secondary to recent stressors?  Will try to start Inderal LA 60 mg 1 p.o. daily for prophylactic control of headaches.  Continue/refill Maxalt 10 mg 1 p.o. with headache, may repeat after 2 hours as needed, quantity 15 with 3 refills.  If not improved then follow-up sooner than next regular scheduled appointment and may need MRI of brain    Other orders  -     rizatriptan (MAXALT) 10 MG tablet; Take 1 tablet by mouth 1 (One) Time As Needed for Migraine for up to 1 dose. May repeat in 2 hours if needed  -     propranolol LA (Inderal LA) 60 MG 24 hr capsule; Take 1 capsule by mouth Daily.    [facility] video visit time 30 minutes, technical difficulties thus had to convert to telephone visit.    Keep already scheduled appointment in November         Return in about 3 months (around 12/29/2020) for Keep the appointment that is already scheduled in November.     This was an audio and video enabled telemedicine encounter.

## 2020-09-29 NOTE — PATIENT INSTRUCTIONS
Start Inderal LA 60 mg 1 p.o. daily  If not better follow-up sooner than next regular appointment.  Continue current treatment plan.

## 2020-11-24 ENCOUNTER — OFFICE VISIT (OUTPATIENT)
Dept: FAMILY MEDICINE CLINIC | Facility: CLINIC | Age: 55
End: 2020-11-24

## 2020-11-24 VITALS
WEIGHT: 171.2 LBS | TEMPERATURE: 97.3 F | SYSTOLIC BLOOD PRESSURE: 148 MMHG | HEART RATE: 73 BPM | DIASTOLIC BLOOD PRESSURE: 98 MMHG | BODY MASS INDEX: 30.33 KG/M2 | OXYGEN SATURATION: 98 % | HEIGHT: 63 IN

## 2020-11-24 DIAGNOSIS — Z01.419 WELL WOMAN EXAM WITH ROUTINE GYNECOLOGICAL EXAM: Primary | ICD-10-CM

## 2020-11-24 DIAGNOSIS — Z12.11 ENCOUNTER FOR SCREENING COLONOSCOPY: ICD-10-CM

## 2020-11-24 DIAGNOSIS — M19.90 ARTHRITIS: ICD-10-CM

## 2020-11-24 DIAGNOSIS — E78.2 MIXED HYPERLIPIDEMIA: ICD-10-CM

## 2020-11-24 DIAGNOSIS — Z87.19 HISTORY OF GASTROESOPHAGEAL REFLUX (GERD): ICD-10-CM

## 2020-11-24 DIAGNOSIS — Z78.0 POSTMENOPAUSAL: ICD-10-CM

## 2020-11-24 DIAGNOSIS — F41.9 ANXIETY: ICD-10-CM

## 2020-11-24 DIAGNOSIS — Z86.69 HISTORY OF MIGRAINE: ICD-10-CM

## 2020-11-24 DIAGNOSIS — Z87.891 PERSONAL HISTORY OF TOBACCO USE: ICD-10-CM

## 2020-11-24 DIAGNOSIS — I10 ESSENTIAL HYPERTENSION: ICD-10-CM

## 2020-11-24 DIAGNOSIS — F51.01 PRIMARY INSOMNIA: ICD-10-CM

## 2020-11-24 DIAGNOSIS — Z79.899 HIGH RISK MEDICATION USE: ICD-10-CM

## 2020-11-24 DIAGNOSIS — Z12.31 ENCOUNTER FOR SCREENING MAMMOGRAM FOR BREAST CANCER: ICD-10-CM

## 2020-11-24 PROBLEM — F41.1 GAD (GENERALIZED ANXIETY DISORDER): Status: RESOLVED | Noted: 2020-03-02 | Resolved: 2020-11-24

## 2020-11-24 PROBLEM — M15.9 PRIMARY OSTEOARTHRITIS INVOLVING MULTIPLE JOINTS: Status: RESOLVED | Noted: 2020-06-09 | Resolved: 2020-11-24

## 2020-11-24 PROBLEM — K21.9 GASTROESOPHAGEAL REFLUX DISEASE WITHOUT ESOPHAGITIS: Status: RESOLVED | Noted: 2020-06-09 | Resolved: 2020-11-24

## 2020-11-24 LAB
DEVELOPER EXPIRATION DATE: ABNORMAL
DEVELOPER LOT NUMBER: ABNORMAL
EXPIRATION DATE: ABNORMAL
FECAL OCCULT BLOOD SCREEN, POC: POSITIVE
Lab: ABNORMAL
NEGATIVE CONTROL: NEGATIVE
POSITIVE CONTROL: POSITIVE

## 2020-11-24 PROCEDURE — 82270 OCCULT BLOOD FECES: CPT | Performed by: FAMILY MEDICINE

## 2020-11-24 PROCEDURE — 99214 OFFICE O/P EST MOD 30 MIN: CPT | Performed by: FAMILY MEDICINE

## 2020-11-24 PROCEDURE — 99406 BEHAV CHNG SMOKING 3-10 MIN: CPT | Performed by: FAMILY MEDICINE

## 2020-11-24 PROCEDURE — 99396 PREV VISIT EST AGE 40-64: CPT | Performed by: FAMILY MEDICINE

## 2020-11-24 RX ORDER — ALPRAZOLAM 0.5 MG/1
0.5 TABLET ORAL NIGHTLY PRN
Qty: 30 TABLET | Refills: 2 | Status: SHIPPED | OUTPATIENT
Start: 2020-11-30 | End: 2021-02-26

## 2020-11-24 RX ORDER — BUPROPION HYDROCHLORIDE 150 MG/1
150 TABLET ORAL DAILY
Qty: 30 TABLET | Refills: 5 | Status: SHIPPED | OUTPATIENT
Start: 2020-11-24 | End: 2021-03-15 | Stop reason: SDUPTHER

## 2020-11-24 RX ORDER — IBUPROFEN 800 MG/1
800 TABLET ORAL 3 TIMES DAILY PRN
Qty: 270 TABLET | Refills: 1 | Status: SHIPPED | OUTPATIENT
Start: 2020-11-24 | End: 2021-03-15 | Stop reason: SDUPTHER

## 2020-11-24 RX ORDER — LISINOPRIL 20 MG/1
20 TABLET ORAL DAILY
Qty: 30 TABLET | Refills: 5 | Status: SHIPPED | OUTPATIENT
Start: 2020-11-24 | End: 2021-03-15 | Stop reason: SDUPTHER

## 2020-11-24 RX ORDER — OMEPRAZOLE 20 MG/1
20 CAPSULE, DELAYED RELEASE ORAL DAILY
Qty: 90 CAPSULE | Refills: 1 | Status: SHIPPED | OUTPATIENT
Start: 2020-11-24 | End: 2021-03-15 | Stop reason: SDUPTHER

## 2020-11-24 RX ORDER — FLUOXETINE HYDROCHLORIDE 40 MG/1
40 CAPSULE ORAL DAILY
Qty: 90 CAPSULE | Refills: 1 | Status: SHIPPED | OUTPATIENT
Start: 2020-11-24 | End: 2021-03-15 | Stop reason: SDUPTHER

## 2020-11-24 NOTE — PATIENT INSTRUCTIONS
Recommend low fat/low calorie diet and exercise greater than 150 minutes of cardio per week.   Start lisinopril 20 mg daily  Start Wellbutrin  Get mammogram, DEXA, colonoscopy, lung CT of chest  Stop smoking!  If not better follow-up sooner than next regular appointment.

## 2020-11-24 NOTE — PROGRESS NOTES
Subjective   Holli Patel is a 55 y.o. female.     Vitals:    11/24/20 1433   BP: 148/98   Pulse: 73   Temp: 97.3 °F (36.3 °C)   SpO2: 98%        Chief Complaint   Patient presents with   • Annual Exam     Patient had labs drawn in june she is needing her medications refilled  ( wore mask and goggles)    • Gynecologic Exam     Patient is here for pap smear    • Nicotine Dependence     Patient is wanting to get patches to quit smoking    • Anxiety   • Headache   • Hyperlipidemia   • Med Refill        History of Present Illness    Here for well woman exam and 3-month follow-up for chronic SHAYY with insomnia, hyperlipidemia, OA, GERD, and still uncontrolled migraines    LOV with me in September via telemedicine for uncontrolled migraines and medication refills.  See changes made below.  Unfortunately, still complains of uncontrolled migraines and stresses.  Needs all refills today    Migraines--uncontrolled at LOV in September.  Thus, prophylactic Inderal LA 60 mg daily was added to her Maxalt as needed.  Unfortunately, she tells me her migraines are not under any better control.  She is still experiencing headaches almost on a daily basis.  Often wakes up with a headache.  Of note, she did have a CT of head without contrast back in March 2020, that was read as normal.  Note today's blood pressure, weight gain.    SHAYY with insomnia--has been under fair control with a combination of Prozac 40 mg daily, Vistaril as needed, and Xanax 0.5 mg nightly.  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  Does need refill today.  Last urine tox screen for compliance was in February 2019 and consistent with prescription history.  Star reviewed by me today shows Xanax 0.5 mg quantity of 30 last refilled 10/31/2020.    Hyperlipidemia--has been well controlled with Zocor 20 mg daily.  Tolerates  medication without side effects.  Last lipids checked in June, .  Fasting today.  Needs refills    OA--has been well controlled with ibuprofen 800 mg 3 times daily as needed.  Tolerates medication without side effects.  Needs refills    GERD--has been well controlled with as needed Prilosec.  Request refills today    Routine health maintenance/screening test:  No colorectal screen yet despite repeated recommendations over the years, ready to proceed with screening  Last Pap smear greater than 3 years ago, normal  Last mammogram greater than 3 years ago  No bone density  Tdap in 2019  Influenza vaccine in 2020  Tries to maintain a healthy well-balanced diet yet recently admits to poor diet/stress eating and lack of exercise, all of which have led to some recent weight gain  Significant tobacco history greater than 30 pack/day history, ready to quit!  Social alcohol only  Family history negative for colon cancer, premature CAD, breast cancer        The following portions of the patient's history were reviewed and updated as appropriate: allergies, current medications, past family history, past medical history, past social history, past surgical history and problem list.    Review of Systems   Constitutional: Positive for fatigue and unexpected weight gain.   Respiratory: Positive for wheezing. Negative for shortness of breath.    Cardiovascular: Negative for chest pain.   Neurological: Positive for headache.   Psychiatric/Behavioral: Positive for sleep disturbance and stress. The patient is nervous/anxious.        Objective   Physical Exam  Vitals signs and nursing note reviewed. Exam conducted with a chaperone present.   Constitutional:       Appearance: Normal appearance. She is well-developed and normal weight.   HENT:      Head: Normocephalic and atraumatic.      Right Ear: External ear normal.      Left Ear: External ear normal.      Nose: Nose normal.   Eyes:      Conjunctiva/sclera: Conjunctivae normal.       Pupils: Pupils are equal, round, and reactive to light.   Neck:      Musculoskeletal: Normal range of motion and neck supple.      Thyroid: No thyromegaly.      Vascular: No carotid bruit.   Cardiovascular:      Rate and Rhythm: Normal rate and regular rhythm.      Pulses: Normal pulses.      Heart sounds: Normal heart sounds. No murmur.   Pulmonary:      Effort: Pulmonary effort is normal.      Breath sounds: Normal breath sounds.   Chest:      Breasts:         Right: Normal. No inverted nipple, mass, nipple discharge or tenderness.         Left: Normal. No inverted nipple, mass, nipple discharge or tenderness.   Abdominal:      General: Abdomen is flat. Bowel sounds are normal. There is no distension.      Palpations: Abdomen is soft. There is no hepatomegaly, splenomegaly or mass.      Tenderness: There is no abdominal tenderness.      Hernia: No hernia is present. There is no hernia in the left inguinal area or right inguinal area.   Genitourinary:     General: Normal vulva.      Exam position: Lithotomy position.      Vagina: Normal.      Cervix: Normal.      Uterus: Normal.       Adnexa: Right adnexa normal and left adnexa normal.        Right: No mass, tenderness or fullness.          Left: No mass, tenderness or fullness.        Rectum: Normal. Guaiac result negative. No mass.      Comments: Pap smear done  Musculoskeletal: Normal range of motion.      Right lower leg: No edema.      Left lower leg: No edema.   Lymphadenopathy:      Cervical: No cervical adenopathy.      Upper Body:      Right upper body: No supraclavicular or axillary adenopathy.      Left upper body: No supraclavicular or axillary adenopathy.      Lower Body: No right inguinal adenopathy. No left inguinal adenopathy.   Skin:     General: Skin is warm and dry.      Capillary Refill: Capillary refill takes less than 2 seconds.      Comments: No dysplastic nevi or abnormal lesions   Neurological:      General: No focal deficit present.       Mental Status: She is alert and oriented to person, place, and time.   Psychiatric:         Mood and Affect: Mood normal.         Behavior: Behavior normal.         Thought Content: Thought content normal.         Judgment: Judgment normal.          LABS/STUDIES --June 2020--, HDL 63, triglycerides 176, normal CMP, normal TSH    Procedures     Assessment/Plan   Diagnoses and all orders for this visit:    1. Well woman exam with routine gynecological exam (Primary) -S/P Pap/pelvic/breast exam, done WNL.  Needs multiple screening tests to include: CBC, colonoscopy, mammogram, DEXA, and low-dose CT of chest.  See orders below.  Influenza and Tdap up-to-date, will consider shingles vaccine.  Otherwise, needs to improve upon a healthy well-balanced diet and increase cardio exercise to greater than 150 minutes weekly  Needs to DC tobacco!  See below  -     CBC & Differential    2. Encounter for screening colonoscopy  -     Ambulatory Referral For Screening Colonoscopy    3. Encounter for screening mammogram for breast cancer  -     Mammo Screening Bilateral With CAD; Future    4. Postmenopausal  -     DEXA Bone Density Axial; Future    5. Essential hypertension --new onset/uncontrolled.  Likely secondary to uncontrolled stresses and weight gain?  May be contributing to uncontrolled headaches?  We will start lisinopril 20 mg 1 p.o. daily.  Needs to improve  lifestyle with improvements in diet, exercise, and DC tobacco!    6. History of migraine --uncontrolled.  May be secondary to uncontrolled HTN?.  Will start lisinopril, continue Inderal LA as prescribed, and may continue as needed Maxalt.  If not improved may need MRI of brain (note, CT head without was performed in March 2020 after a fall)    7. Anxiety --uncontrolled.  Will add Wellbutrin  mg daily.  Continue/refill Prozac 40 mg 1 p.o. daily  May continue both Vistaril as needed and Xanax 0.5 mg nightly, refill as directed below.  Star reviewed and  appropriate.  Will update urine tox screen for compliance today.  -     ALPRAZolam (XANAX) 0.5 MG tablet; Take 1 tablet by mouth At Night As Needed for Anxiety.  Dispense: 30 tablet; Refill: 2    8. Primary insomnia --stable.  Star reviewed and appropriate.  Update urine tox screen for compliance today.  Continue/refill Xanax 0.5 mg nightly.      9. History of gastroesophageal reflux (GERD) stable.  Refill omeprazole 20 mg 1 p.o. daily as needed    10. Mixed hyperlipidemia stable.  Recheck lipids and CMP today.  Goal LDL needs remain less than 130 given history of HTN.  As long as therapeutic then no change with Zocor 20 mg 1 p.o. daily, will refill upon request  -     Comprehensive Metabolic Panel  -     Lipid Panel With LDL / HDL Ratio    11. Arthritis -stable.  Refill ibuprofen 800 mg 1 p.o. 3 times daily as needed, recheck renal function as well today.    12. Personal history of tobacco use --needs to DC tobacco and ready to quit!  Obtain lung cancer screening with low-dose CT of chest, order placed.  Holli Amanda  reports that she has been smoking electronic cigarette. She has been smoking about 1.00 pack per day. She has never used smokeless tobacco.. I have educated her on the risk of diseases from using tobacco products such as cancer, COPD and heart disease.     I advised her to quit and she is willing to quit. We have discussed the following method/s for tobacco cessation:  Prescription Medicaiton.  Together we have set a quit date for 1 month from today.  She will follow up with me in 3 months or sooner to check on her progress.    I spent 4 minutes counseling the patient.    We will prescribe Wellbutrin  mg daily, have counseled patient.    13. High risk medication use  -     ToxASSURE Select 13 Discrete -    Other orders  -     FLUoxetine (PROzac) 40 MG capsule; Take 1 capsule by mouth Daily.  Dispense: 90 capsule; Refill: 1  -     ibuprofen (ADVIL,MOTRIN) 800 MG tablet; Take 1 tablet by mouth  3 (Three) Times a Day As Needed for Headache.  Dispense: 270 tablet; Refill: 1  -     omeprazole (PrilOSEC) 20 MG capsule; Take 1 capsule by mouth Daily.  Dispense: 90 capsule; Refill: 1  -     lisinopril (PRINIVIL,ZESTRIL) 20 MG tablet; Take 1 tablet by mouth Daily.  Dispense: 30 tablet; Refill: 5      May follow-up in 3 months if home blood pressures are improved, headaches are resolved, and overall doing better.  Otherwise, needs to be seen sooner.           Wore goggles and face mask during entire visit.    Return in about 3 months (around 2/24/2021) for Recheck.

## 2020-11-27 LAB
CYTOLOGIST CVX/VAG CYTO: NORMAL
CYTOLOGY CVX/VAG DOC CYTO: NORMAL
CYTOLOGY CVX/VAG DOC THIN PREP: NORMAL
DX ICD CODE: NORMAL
HIV 1 & 2 AB SER-IMP: NORMAL
HPV I/H RISK 1 DNA CVX QL PROBE+SIG AMP: NEGATIVE
OTHER STN SPEC: NORMAL
STAT OF ADQ CVX/VAG CYTO-IMP: NORMAL

## 2020-11-28 LAB
6MAM UR QL CFM: NEGATIVE
6MAM/CREAT UR: NOT DETECTED NG/MG CREAT
7AMINOCLONAZEPAM/CREAT UR: NOT DETECTED NG/MG CREAT
A-OH ALPRAZ/CREAT UR: 107 NG/MG CREAT
A-OH-TRIAZOLAM/CREAT UR CFM: NOT DETECTED NG/MG CREAT
ALBUMIN SERPL-MCNC: 4.6 G/DL (ref 3.5–5.2)
ALBUMIN/GLOB SERPL: 1.8 G/DL
ALFENTANIL/CREAT UR CFM: NOT DETECTED NG/MG CREAT
ALP SERPL-CCNC: 106 U/L (ref 39–117)
ALPHA-HYDROXYMIDAZOLAM, URINE: NOT DETECTED NG/MG CREAT
ALPRAZ/CREAT UR CFM: 85 NG/MG CREAT
ALT SERPL-CCNC: 12 U/L (ref 1–33)
AMOBARBITAL UR QL CFM: NOT DETECTED
AMPHET/CREAT UR: NOT DETECTED NG/MG CREAT
AMPHETAMINES UR QL CFM: NEGATIVE
AST SERPL-CCNC: 12 U/L (ref 1–32)
BARBITAL UR QL CFM: NOT DETECTED
BARBITURATES UR QL CFM: NEGATIVE
BASOPHILS # BLD AUTO: 0.12 10*3/MM3 (ref 0–0.2)
BASOPHILS NFR BLD AUTO: 1 % (ref 0–1.5)
BENZODIAZ UR QL CFM: NORMAL
BILIRUB SERPL-MCNC: 0.3 MG/DL (ref 0–1.2)
BUN SERPL-MCNC: 15 MG/DL (ref 6–20)
BUN/CREAT SERPL: 19.7 (ref 7–25)
BUPRENORPHINE UR QL CFM: NEGATIVE
BUPRENORPHINE/CREAT UR: NOT DETECTED NG/MG CREAT
BUTABARBITAL UR QL CFM: NOT DETECTED
BUTALBITAL UR QL CFM: NOT DETECTED
BZE/CREAT UR: NOT DETECTED NG/MG CREAT
CALCIUM SERPL-MCNC: 9.6 MG/DL (ref 8.6–10.5)
CANNABINOIDS UR QL CFM: NEGATIVE
CARBOXYTHC/CREAT UR: NOT DETECTED NG/MG CREAT
CHLORIDE SERPL-SCNC: 100 MMOL/L (ref 98–107)
CHOLEST SERPL-MCNC: 232 MG/DL (ref 0–200)
CLONAZEPAM/CREAT UR CFM: NOT DETECTED NG/MG CREAT
CO2 SERPL-SCNC: 31.4 MMOL/L (ref 22–29)
COCAETHYLENE/CREAT UR CFM: NOT DETECTED NG/MG CREAT
COCAINE UR QL CFM: NEGATIVE
COCAINE/CREAT UR CFM: NOT DETECTED NG/MG CREAT
CODEINE/CREAT UR: NOT DETECTED NG/MG CREAT
CREAT SERPL-MCNC: 0.76 MG/DL (ref 0.57–1)
CREAT UR-MCNC: 82 MG/DL
DESALKYLFLURAZ/CREAT UR: NOT DETECTED NG/MG CREAT
DESMETHYLFLUNITRAZEPAM: NOT DETECTED NG/MG CREAT
DHC/CREAT UR: NOT DETECTED NG/MG CREAT
DIAZEPAM/CREAT UR: NOT DETECTED NG/MG CREAT
DRUGS UR: NORMAL
EDDP/CREAT UR: NOT DETECTED NG/MG CREAT
EOSINOPHIL # BLD AUTO: 0.38 10*3/MM3 (ref 0–0.4)
EOSINOPHIL NFR BLD AUTO: 3.1 % (ref 0.3–6.2)
ERYTHROCYTE [DISTWIDTH] IN BLOOD BY AUTOMATED COUNT: 13.4 % (ref 12.3–15.4)
ETHANOL UR CFM-MCNC: NOT DETECTED G/DL
ETHANOL UR QL CFM: NEGATIVE
FENTANYL UR QL CFM: NEGATIVE
FENTANYL/CREAT UR: NOT DETECTED NG/MG CREAT
FLUNITRAZEPAM UR QL CFM: NOT DETECTED NG/MG CREAT
GLOBULIN SER CALC-MCNC: 2.5 GM/DL
GLUCOSE SERPL-MCNC: 85 MG/DL (ref 65–99)
HCT VFR BLD AUTO: 44.9 % (ref 34–46.6)
HDLC SERPL-MCNC: 67 MG/DL (ref 40–60)
HGB BLD-MCNC: 14.3 G/DL (ref 12–15.9)
HYDROCODONE/CREAT UR: NOT DETECTED NG/MG CREAT
HYDROMORPHONE/CREAT UR: NOT DETECTED NG/MG CREAT
IMM GRANULOCYTES # BLD AUTO: 0.05 10*3/MM3 (ref 0–0.05)
IMM GRANULOCYTES NFR BLD AUTO: 0.4 % (ref 0–0.5)
LDLC SERPL CALC-MCNC: 130 MG/DL (ref 0–100)
LDLC/HDLC SERPL: 1.86 {RATIO}
LEVEL OF DETECTION:: NORMAL
LORAZEPAM/CREAT UR: NOT DETECTED NG/MG CREAT
LYMPHOCYTES # BLD AUTO: 3.3 10*3/MM3 (ref 0.7–3.1)
LYMPHOCYTES NFR BLD AUTO: 27 % (ref 19.6–45.3)
MCH RBC QN AUTO: 26.3 PG (ref 26.6–33)
MCHC RBC AUTO-ENTMCNC: 31.8 G/DL (ref 31.5–35.7)
MCV RBC AUTO: 82.7 FL (ref 79–97)
MDA/CREAT UR: NOT DETECTED NG/MG CREAT
MDMA/CREAT UR: NOT DETECTED NG/MG CREAT
MEPHOBARBITAL UR QL CFM: NOT DETECTED
METHADONE UR QL CFM: NEGATIVE
METHADONE/CREAT UR: NOT DETECTED NG/MG CREAT
METHAMPHET/CREAT UR: NOT DETECTED NG/MG CREAT
MIDAZOLAM/CREAT UR CFM: NOT DETECTED NG/MG CREAT
MONOCYTES # BLD AUTO: 0.71 10*3/MM3 (ref 0.1–0.9)
MONOCYTES NFR BLD AUTO: 5.8 % (ref 5–12)
MORPHINE/CREAT UR: NOT DETECTED NG/MG CREAT
N-NORTRAMADOL/CREAT UR CFM: NOT DETECTED NG/MG CREAT
NARCOTICS UR: NEGATIVE
NEUTROPHILS # BLD AUTO: 7.66 10*3/MM3 (ref 1.7–7)
NEUTROPHILS NFR BLD AUTO: 62.7 % (ref 42.7–76)
NORBUPRENORPHINE/CREAT UR: NOT DETECTED NG/MG CREAT
NORCODEINE/CREAT UR CFM: NOT DETECTED NG/MG CREAT
NORDIAZEPAM/CREAT UR: NOT DETECTED NG/MG CREAT
NORFENTANYL/CREAT UR: NOT DETECTED NG/MG CREAT
NORHYDROCODONE/CREAT UR: NOT DETECTED NG/MG CREAT
NORMORPHINE UR-MCNC: NOT DETECTED NG/MG CREAT
NOROXYCODONE/CREAT UR: NOT DETECTED NG/MG CREAT
NOROXYMORPHONE/CREAT UR CFM: NOT DETECTED NG/MG CREAT
NRBC BLD AUTO-RTO: 0 /100 WBC (ref 0–0.2)
O-NORTRAMADOL UR CFM-MCNC: NOT DETECTED NG/MG CREAT
OPIATES UR QL CFM: NEGATIVE
OXAZEPAM/CREAT UR: NOT DETECTED NG/MG CREAT
OXYCODONE UR QL CFM: NEGATIVE
OXYCODONE/CREAT UR: NOT DETECTED NG/MG CREAT
OXYMORPHONE/CREAT UR: NOT DETECTED NG/MG CREAT
PENTOBARB UR QL CFM: NOT DETECTED
PHENOBARB UR QL CFM: NOT DETECTED
PLATELET # BLD AUTO: 371 10*3/MM3 (ref 140–450)
POTASSIUM SERPL-SCNC: 4.8 MMOL/L (ref 3.5–5.2)
PROT SERPL-MCNC: 7.1 G/DL (ref 6–8.5)
RBC # BLD AUTO: 5.43 10*6/MM3 (ref 3.77–5.28)
SECOBARBITAL UR QL CFM: NOT DETECTED
SODIUM SERPL-SCNC: 141 MMOL/L (ref 136–145)
SUFENTANIL/CREAT UR CFM: NOT DETECTED NG/MG CREAT
TAPENTADOL UR QL CFM: NEGATIVE
TAPENTADOL/CREAT UR: NOT DETECTED NG/MG CREAT
TEMAZEPAM/CREAT UR: NOT DETECTED NG/MG CREAT
THIOPENTAL UR QL CFM: NOT DETECTED
TRAMADOL UR QL CFM: NOT DETECTED NG/MG CREAT
TRIGL SERPL-MCNC: 201 MG/DL (ref 0–150)
VLDLC SERPL CALC-MCNC: 35 MG/DL (ref 5–40)
WBC # BLD AUTO: 12.22 10*3/MM3 (ref 3.4–10.8)

## 2020-12-30 RX ORDER — SIMVASTATIN 20 MG
TABLET ORAL
Qty: 30 TABLET | Refills: 0 | Status: SHIPPED | OUTPATIENT
Start: 2020-12-30 | End: 2021-01-30

## 2021-01-21 RX ORDER — RIZATRIPTAN BENZOATE 10 MG/1
TABLET ORAL
Qty: 15 TABLET | Refills: 0 | Status: SHIPPED | OUTPATIENT
Start: 2021-01-21 | End: 2021-02-19 | Stop reason: SDUPTHER

## 2021-01-30 RX ORDER — SIMVASTATIN 20 MG
TABLET ORAL
Qty: 30 TABLET | Refills: 0 | Status: SHIPPED | OUTPATIENT
Start: 2021-01-30 | End: 2021-02-01 | Stop reason: SDUPTHER

## 2021-02-01 ENCOUNTER — TELEPHONE (OUTPATIENT)
Dept: FAMILY MEDICINE CLINIC | Facility: CLINIC | Age: 56
End: 2021-02-01

## 2021-02-01 NOTE — TELEPHONE ENCOUNTER
Caller: Holli Patel    Relationship to patient: Self    Best call back number: [phone]    Patient is needing: Patient called in and wanted to know when she needed her next labs and appointment. She said she usually scheduled every 3 months but there is no current appointment scheduled and the next available is in late April. Please call patient and advise.

## 2021-02-01 NOTE — TELEPHONE ENCOUNTER
Caller: Holli Patel    Relationship: Self    Best call back number: [phone]    Medication needed:   Requested Prescriptions     Pending Prescriptions Disp Refills   • simvastatin (ZOCOR) 20 MG tablet 30 tablet 0     Sig: Take 1 tablet by mouth Daily.       When do you need the refill by: ASAP     What details did the patient provide when requesting the medication: Patient is out on 2/2/21.   She said she needs the 90 and not the 30 day supply  Does the patient have less than a 3 day supply:  [x] Yes  [] No    What is the patient's preferred pharmacy: [address] [facility] AT [facility] [address]  - [phone] [facility] [phone] FX

## 2021-02-02 RX ORDER — SIMVASTATIN 20 MG
20 TABLET ORAL DAILY
Qty: 90 TABLET | Refills: 0 | Status: SHIPPED | OUTPATIENT
Start: 2021-02-02 | End: 2021-03-15 | Stop reason: SDUPTHER

## 2021-02-18 VITALS
SYSTOLIC BLOOD PRESSURE: 130 MMHG | TEMPERATURE: 97.2 F | DIASTOLIC BLOOD PRESSURE: 45 MMHG | SYSTOLIC BLOOD PRESSURE: 183 MMHG | SYSTOLIC BLOOD PRESSURE: 180 MMHG | HEART RATE: 82 BPM | OXYGEN SATURATION: 96 % | RESPIRATION RATE: 14 BRPM | RESPIRATION RATE: 24 BRPM | SYSTOLIC BLOOD PRESSURE: 112 MMHG | HEART RATE: 79 BPM | OXYGEN SATURATION: 99 % | HEART RATE: 81 BPM | RESPIRATION RATE: 17 BRPM | WEIGHT: 165 LBS | HEART RATE: 78 BPM | RESPIRATION RATE: 26 BRPM | SYSTOLIC BLOOD PRESSURE: 142 MMHG | HEIGHT: 62 IN | OXYGEN SATURATION: 98 % | TEMPERATURE: 97.1 F | SYSTOLIC BLOOD PRESSURE: 143 MMHG | SYSTOLIC BLOOD PRESSURE: 132 MMHG | DIASTOLIC BLOOD PRESSURE: 91 MMHG | OXYGEN SATURATION: 100 % | DIASTOLIC BLOOD PRESSURE: 90 MMHG | RESPIRATION RATE: 18 BRPM | HEART RATE: 74 BPM | DIASTOLIC BLOOD PRESSURE: 87 MMHG | HEART RATE: 85 BPM | HEART RATE: 77 BPM | DIASTOLIC BLOOD PRESSURE: 84 MMHG | SYSTOLIC BLOOD PRESSURE: 145 MMHG | RESPIRATION RATE: 15 BRPM | DIASTOLIC BLOOD PRESSURE: 58 MMHG | DIASTOLIC BLOOD PRESSURE: 94 MMHG

## 2021-02-21 RX ORDER — RIZATRIPTAN BENZOATE 10 MG/1
10 TABLET ORAL ONCE AS NEEDED
Qty: 6 TABLET | Refills: 0 | Status: SHIPPED | OUTPATIENT
Start: 2021-02-21 | End: 2021-03-11

## 2021-02-22 PROBLEM — Z12.11 SCREENING FOR COLONIC NEOPLASIA: Status: ACTIVE | Noted: 2021-02-23

## 2021-02-23 ENCOUNTER — AMBULATORY SURGICAL CENTER (OUTPATIENT)
Dept: URBAN - METROPOLITAN AREA SURGERY 17 | Facility: SURGERY | Age: 56
End: 2021-02-23
Payer: COMMERCIAL

## 2021-02-23 ENCOUNTER — OFFICE (OUTPATIENT)
Dept: URBAN - METROPOLITAN AREA PATHOLOGY 4 | Facility: PATHOLOGY | Age: 56
End: 2021-02-23
Payer: COMMERCIAL

## 2021-02-23 DIAGNOSIS — K64.4 RESIDUAL HEMORRHOIDAL SKIN TAGS: ICD-10-CM

## 2021-02-23 DIAGNOSIS — Z12.11 ENCOUNTER FOR SCREENING FOR MALIGNANT NEOPLASM OF COLON: ICD-10-CM

## 2021-02-23 DIAGNOSIS — D12.3 BENIGN NEOPLASM OF TRANSVERSE COLON: ICD-10-CM

## 2021-02-23 DIAGNOSIS — K51.40 INFLAMMATORY POLYPS OF COLON WITHOUT COMPLICATIONS: ICD-10-CM

## 2021-02-23 DIAGNOSIS — K57.30 DIVERTICULOSIS OF LARGE INTESTINE WITHOUT PERFORATION OR ABS: ICD-10-CM

## 2021-02-23 DIAGNOSIS — D12.0 BENIGN NEOPLASM OF CECUM: ICD-10-CM

## 2021-02-23 DIAGNOSIS — K64.8 OTHER HEMORRHOIDS: ICD-10-CM

## 2021-02-23 PROBLEM — K63.5 POLYP OF COLON: Status: ACTIVE | Noted: 2021-02-23

## 2021-02-23 LAB
GI HISTOLOGY: A. UNSPECIFIED: (no result)
GI HISTOLOGY: B. UNSPECIFIED: (no result)
GI HISTOLOGY: C. UNSPECIFIED: (no result)
GI HISTOLOGY: PDF REPORT: (no result)

## 2021-02-23 PROCEDURE — 88305 TISSUE EXAM BY PATHOLOGIST: CPT | Mod: 33 | Performed by: INTERNAL MEDICINE

## 2021-02-23 PROCEDURE — 45385 COLONOSCOPY W/LESION REMOVAL: CPT | Mod: 33 | Performed by: INTERNAL MEDICINE

## 2021-02-26 DIAGNOSIS — F41.9 ANXIETY: ICD-10-CM

## 2021-02-28 RX ORDER — ALPRAZOLAM 0.5 MG/1
TABLET ORAL
Qty: 15 TABLET | Refills: 0 | Status: SHIPPED | OUTPATIENT
Start: 2021-02-28 | End: 2021-03-15 | Stop reason: SDUPTHER

## 2021-03-01 ENCOUNTER — HOSPITAL ENCOUNTER (OUTPATIENT)
Dept: MAMMOGRAPHY | Facility: HOSPITAL | Age: 56
Discharge: HOME | End: 2021-03-01

## 2021-03-01 ENCOUNTER — HOSPITAL ENCOUNTER (OUTPATIENT)
Dept: BONE DENSITY | Facility: HOSPITAL | Age: 56
Discharge: HOME | End: 2021-03-01

## 2021-03-01 ENCOUNTER — APPOINTMENT (OUTPATIENT)
Dept: CT IMAGING | Facility: HOSPITAL | Age: 56
End: 2021-03-01

## 2021-03-01 DIAGNOSIS — Z12.31 ENCOUNTER FOR SCREENING MAMMOGRAM FOR BREAST CANCER: ICD-10-CM

## 2021-03-01 DIAGNOSIS — Z78.0 POSTMENOPAUSAL: ICD-10-CM

## 2021-03-01 PROCEDURE — 77080 DXA BONE DENSITY AXIAL: CPT

## 2021-03-01 PROCEDURE — 77067 SCR MAMMO BI INCL CAD: CPT

## 2021-03-08 DIAGNOSIS — R92.8 ABNORMAL MAMMOGRAM OF BOTH BREASTS: Primary | ICD-10-CM

## 2021-03-11 RX ORDER — RIZATRIPTAN BENZOATE 10 MG/1
TABLET ORAL
Qty: 12 TABLET | Refills: 0 | Status: SHIPPED | OUTPATIENT
Start: 2021-03-11 | End: 2021-03-15 | Stop reason: SDUPTHER

## 2021-03-11 NOTE — TELEPHONE ENCOUNTER
Caller: Holli Patel    Relationship: Self    Best call back number:  [phone]    Medication needed:   Requested Prescriptions     Pending Prescriptions Disp Refills   • rizatriptan (MAXALT) 10 MG tablet [Pharmacy Med Name: RIZATRIPTAN 10 MG TABLET] 12 tablet 0     Sig: TAKE ONE TABLET BY MOUTH AT ONSET OF MIGRAINE; MAY REPEAT ONE TABLET IN 2 HOURS IF NEEDED.       When do you need the refill by: ASAP    What details did the patient provide when requesting the medication:Patient is completely out     Does the patient have less than a 3 day supply:  [x] Yes  [] No    What is the patient's preferred pharmacy: [address] [facility] AT [facility] & [address] [facility] [phone] [facility] [phone] FX

## 2021-03-15 ENCOUNTER — OFFICE VISIT (OUTPATIENT)
Dept: FAMILY MEDICINE CLINIC | Facility: CLINIC | Age: 56
End: 2021-03-15

## 2021-03-15 VITALS
DIASTOLIC BLOOD PRESSURE: 82 MMHG | BODY MASS INDEX: 32.32 KG/M2 | HEART RATE: 74 BPM | HEIGHT: 63 IN | WEIGHT: 182.4 LBS | OXYGEN SATURATION: 98 % | TEMPERATURE: 97.5 F | SYSTOLIC BLOOD PRESSURE: 126 MMHG

## 2021-03-15 DIAGNOSIS — M19.90 ARTHRITIS: ICD-10-CM

## 2021-03-15 DIAGNOSIS — R92.8 ABNORMAL MAMMOGRAM: ICD-10-CM

## 2021-03-15 DIAGNOSIS — F41.9 ANXIETY: ICD-10-CM

## 2021-03-15 DIAGNOSIS — F51.01 PRIMARY INSOMNIA: ICD-10-CM

## 2021-03-15 DIAGNOSIS — Z87.19 HISTORY OF GASTROESOPHAGEAL REFLUX (GERD): ICD-10-CM

## 2021-03-15 DIAGNOSIS — Z86.69 HISTORY OF MIGRAINE: ICD-10-CM

## 2021-03-15 DIAGNOSIS — Z87.891 PERSONAL HISTORY OF TOBACCO USE: ICD-10-CM

## 2021-03-15 DIAGNOSIS — R63.5 WEIGHT GAIN: ICD-10-CM

## 2021-03-15 DIAGNOSIS — E78.2 MIXED HYPERLIPIDEMIA: ICD-10-CM

## 2021-03-15 DIAGNOSIS — R06.00 DYSPNEA, UNSPECIFIED TYPE: ICD-10-CM

## 2021-03-15 DIAGNOSIS — E03.8 OTHER SPECIFIED HYPOTHYROIDISM: ICD-10-CM

## 2021-03-15 DIAGNOSIS — I10 ESSENTIAL HYPERTENSION: Primary | ICD-10-CM

## 2021-03-15 DIAGNOSIS — M85.80 OSTEOPENIA, UNSPECIFIED LOCATION: ICD-10-CM

## 2021-03-15 DIAGNOSIS — D72.829 LEUKOCYTOSIS, UNSPECIFIED TYPE: ICD-10-CM

## 2021-03-15 PROBLEM — Z86.010 HISTORY OF COLON POLYPS: Status: ACTIVE | Noted: 2021-03-15

## 2021-03-15 PROBLEM — K57.90 DIVERTICULOSIS: Status: ACTIVE | Noted: 2021-03-15

## 2021-03-15 PROCEDURE — 99214 OFFICE O/P EST MOD 30 MIN: CPT | Performed by: FAMILY MEDICINE

## 2021-03-15 RX ORDER — ALPRAZOLAM 0.5 MG/1
0.5 TABLET ORAL NIGHTLY PRN
Qty: 30 TABLET | Refills: 2 | Status: SHIPPED | OUTPATIENT
Start: 2021-03-15 | End: 2021-04-26 | Stop reason: SDUPTHER

## 2021-03-15 RX ORDER — OMEPRAZOLE 20 MG/1
20 CAPSULE, DELAYED RELEASE ORAL DAILY
Qty: 90 CAPSULE | Refills: 1 | Status: SHIPPED | OUTPATIENT
Start: 2021-03-15 | End: 2021-06-15

## 2021-03-15 RX ORDER — PROPRANOLOL HCL 60 MG
60 CAPSULE, EXTENDED RELEASE 24HR ORAL DAILY
Qty: 90 CAPSULE | Refills: 1 | Status: SHIPPED | OUTPATIENT
Start: 2021-03-15 | End: 2022-05-25 | Stop reason: SDUPTHER

## 2021-03-15 RX ORDER — FLUOXETINE HYDROCHLORIDE 40 MG/1
40 CAPSULE ORAL DAILY
Qty: 90 CAPSULE | Refills: 1 | Status: SHIPPED | OUTPATIENT
Start: 2021-03-15 | End: 2022-01-12

## 2021-03-15 RX ORDER — LISINOPRIL 20 MG/1
20 TABLET ORAL DAILY
Qty: 90 TABLET | Refills: 1 | Status: ON HOLD | OUTPATIENT
Start: 2021-03-15 | End: 2021-11-29

## 2021-03-15 RX ORDER — BUPROPION HYDROCHLORIDE 150 MG/1
150 TABLET ORAL DAILY
Qty: 90 TABLET | Refills: 1 | Status: SHIPPED | OUTPATIENT
Start: 2021-03-15 | End: 2021-08-03 | Stop reason: SDUPTHER

## 2021-03-15 RX ORDER — IBUPROFEN 800 MG/1
800 TABLET ORAL 3 TIMES DAILY PRN
Qty: 270 TABLET | Refills: 1 | Status: SHIPPED | OUTPATIENT
Start: 2021-03-15 | End: 2021-07-19

## 2021-03-15 RX ORDER — SIMVASTATIN 20 MG
20 TABLET ORAL DAILY
Qty: 90 TABLET | Refills: 1 | Status: SHIPPED | OUTPATIENT
Start: 2021-03-15 | End: 2021-12-16

## 2021-03-15 RX ORDER — HYDROXYZINE PAMOATE 25 MG/1
25 CAPSULE ORAL 3 TIMES DAILY PRN
Qty: 60 CAPSULE | Refills: 5 | Status: SHIPPED | OUTPATIENT
Start: 2021-03-15 | End: 2021-04-26

## 2021-03-15 RX ORDER — LEVOTHYROXINE SODIUM 0.03 MG/1
25 TABLET ORAL DAILY
Qty: 90 TABLET | Refills: 1 | Status: SHIPPED | OUTPATIENT
Start: 2021-03-15 | End: 2022-05-25 | Stop reason: SDUPTHER

## 2021-03-15 RX ORDER — RIZATRIPTAN BENZOATE 10 MG/1
10 TABLET ORAL ONCE AS NEEDED
Qty: 12 TABLET | Refills: 2 | Status: SHIPPED | OUTPATIENT
Start: 2021-03-15 | End: 2021-05-27

## 2021-03-15 NOTE — PATIENT INSTRUCTIONS
Recommend low fat/low calorie diet and exercise greater than 150 minutes of cardio per week.   Continue current treatment plan.  Get mammogram, CT chest as ordered

## 2021-03-15 NOTE — PROGRESS NOTES
Subjective   Holli Patel is a 55 y.o. female.     Vitals:    03/15/21 0919   BP: 126/82   Pulse: 74   Temp: 97.5 °F (36.4 °C)   SpO2: 98%        Chief Complaint   Patient presents with   • Hypothyroidism     CHECK UP MED REFILL MASK AND GOGGLES WORN   • Anxiety     Follow-up on lots of testing and complaints of shortness of air   • Headache   • Hyperlipidemia   • Hypertension   • Med Refill        History of Present Illness    4-month follow-up on chronic anxiety/insomnia, HTN, hyperlipidemia, hypothyroidism, migraines, lots of screening tests, and complaints of shortness of air    LOV with me in November for wellness exam, new onset HTN, uncontrolled SHAYY, and multiple screening tests  Several changes were made and lots of tests were ordered, see below:    --Lisinopril was added for uncontrolled HTN --tolerating medication, this is helping her BP as well as her migraines  --Wellbutrin was added for uncontrolled SHAYY and to aid with tobacco cessation --this is also helping her anxiety along with she is significantly decreased her cigarette smoking from 2 packs/day down to 5 to 6/day.  --Mammogram --came back abnormal, requiring additional views of both breast and an ultrasound of left breast.  The referral has been placed, awaiting scheduling (this was just reported last week)  --DEXA --osteopenia, she was asked to start Os-Rod D2 per day and increase her strength weightbearing exercise  --C-scope --benign polyps, repeat in 3 years, internal hemorrhoids, and diverticulosis  --Low-dose CT of chest/screening for lung cancer --apparently, this has been rescheduled,, due to be done next week 3/23/2021    Overall, doing better from a blood pressure and migraine standpoint.  However, still dealing with lots of stress (family and work).  Thus, another 10 pound weight gain, overall has gained 20 pounds in the last 6 months still reports some shortness of air.  Denies wheezing, chest pain.  Still smoking, significant  decrease in amount.  Plans for screening low-dose CT of chest next week.    Needs all refills today  Compliant with and tolerating all medications without side effects.  Still takes Xanax nightly for anxiety with insomnia.  Last urine tox screen for compliance was at last visit November 2020 and consistent with prescription history  Star reviewed by me today shows last refill for Xanax quantity of 15 on 3/1/2021 to last till today's appointment; otherwise last refill was on 1/27/2021 for quantity of 30      The following portions of the patient's history were reviewed and updated as appropriate: allergies, current medications, past family history, past medical history, past social history, past surgical history and problem list.    Review of Systems   Constitutional: Negative for fever, unexpected weight gain and unexpected weight loss.   Respiratory: Positive for shortness of breath. Negative for cough and wheezing.    Cardiovascular: Negative for chest pain and palpitations.       Objective   Physical Exam  Vitals and nursing note reviewed.   Constitutional:       Appearance: Normal appearance. She is well-developed. She is obese.   HENT:      Head: Normocephalic and atraumatic.      Nose: Nose normal.   Eyes:      Conjunctiva/sclera: Conjunctivae normal.      Pupils: Pupils are equal, round, and reactive to light.   Neck:      Thyroid: No thyromegaly.   Cardiovascular:      Rate and Rhythm: Normal rate and regular rhythm.      Heart sounds: Normal heart sounds. No murmur.   Pulmonary:      Effort: Pulmonary effort is normal.      Breath sounds: Normal breath sounds.   Abdominal:      General: Abdomen is flat. Bowel sounds are normal. There is no distension.      Palpations: Abdomen is soft. There is no hepatomegaly, splenomegaly or mass.      Tenderness: There is no abdominal tenderness. There is no guarding or rebound.      Hernia: No hernia is present.   Musculoskeletal:         General: Normal range of  motion.      Cervical back: Normal range of motion and neck supple.      Right lower leg: No edema.      Left lower leg: No edema.   Lymphadenopathy:      Cervical: No cervical adenopathy.   Skin:     General: Skin is warm.   Neurological:      General: No focal deficit present.      Mental Status: She is alert.   Psychiatric:         Mood and Affect: Mood normal.         Behavior: Behavior normal.         Thought Content: Thought content normal.         Judgment: Judgment normal.          LABS/STUDIES  --November 2020 --, HDL 67, WBCs 12.2, CMP normal                                  DEXA -L-spine with a T score of -2.2, left hip -2.3, right hip -1.9                                  Mammogram  --right breast with microcalcifications, require additional views.  Left breast with asymmetry, require additional views and ultrasound    Procedures     Assessment/Plan   Diagnoses and all orders for this visit:    1. Essential hypertension (Primary) -stable.  Continue lisinopril 20 mg 1 p.o. daily, will refill upon request    2. Anxiety --stable.  Urine tox and Star both reviewed, appropriate  Continue both Wellbutrin and Xanax as prescribed, refill as directed below  Also, continue Vistaril as directed below PRN  -     ALPRAZolam (XANAX) 0.5 MG tablet; Take 1 tablet by mouth At Night As Needed for Anxiety. for anxiety  Dispense: 30 tablet; Refill: 2    3. Primary insomnia --stable with Xanax nightly, refill as directed above.  Star reviewed and appropriate.    4. Mixed hyperlipidemia --stable.  Refill Zocor 20 mg 1 p.o. daily  Needs low-carb/low calorie/low cholesterol diet and increase cardio exercise to greater than 150 minutes weekly    5. History of gastroesophageal reflux (GERD) stable.  Refill Prilosec 20 mg 1 p.o. daily    6. Other specified hypothyroidism  -controlled?  Given weight gain greater than 9 months since TSH was last checked we will recheck today  If therapeutic then no change with Synthroid  25 mcg every morning,; however, may need to readjust dose?  -     TSH    7. Arthritis  -stable  Renal function within normal limits, thus may continue ibuprofen 800 mg 1 p.o. 3 times daily as needed OA and headache    8. History of migraine  -controlled.  No change in medications  Refill Maxalt as directed below as needed    9. Dyspnea, unspecified type  -new diagnosis.  Most likely secondary to 20 pound weight gain within the last few months??  In addition to history of tobacco use.  We will recheck CBC today  Has plans to obtain low-dose CT of chest for lung cancer screening next week on 3/23/2021.  Strongly recommend needed weight loss with diet and exercise  We will reevaluate in 2 to 3 months if not better then may need to pursue further work-up    10. Weight gain --new diagnosis/uncontrolled  Recheck TSH today  Needs low-carb/low calorie/low cholesterol diet and increase cardio exercise to greater than 150 minutes weekly    11. Leukocytosis, unspecified type new diagnosis.  Most likely secondary to significant tobacco history?  Asymptomatic.  We will recheck CBC today  -     CBC & Differential    12. Personal history of tobacco use  -continue to encourage to DC tobacco!  Currently has plan in place and is working on discontinuing tobacco.  Plans for lung cancer screen next week with low-dose CT of chest    13. Osteopenia, unspecified location  -new diagnosis  Increase strength/weightbearing exercise along with cardio exercise greater than 150 minutes weekly.  Also, continue calcium 1200 to 1500 mg daily, may take Os-Rod 2/day  We will recheck DEXA in 2 years    14. Abnormal mammogram  -bilateral breast.  See full report.  Have discussed with patient in detail.  Orders have been placed for additional views and ultrasound as indicated.  Await referral to be made, patient has been instructed if she does not hear back in the next few days to call us back.    Other orders  -     buPROPion XL (WELLBUTRIN XL) 150 MG  24 hr tablet; Take 1 tablet by mouth Daily.  Dispense: 90 tablet; Refill: 1  -     FLUoxetine (PROzac) 40 MG capsule; Take 1 capsule by mouth Daily.  Dispense: 90 capsule; Refill: 1  -     hydrOXYzine pamoate (Vistaril) 25 MG capsule; Take 1 capsule by mouth 3 (Three) Times a Day As Needed for Itching.  Dispense: 60 capsule; Refill: 5  -     ibuprofen (ADVIL,MOTRIN) 800 MG tablet; Take 1 tablet by mouth 3 (Three) Times a Day As Needed for Headache.  Dispense: 270 tablet; Refill: 1  -     levothyroxine (SYNTHROID, LEVOTHROID) 25 MCG tablet; Take 1 tablet by mouth Daily.  Dispense: 90 tablet; Refill: 1  -     lisinopril (PRINIVIL,ZESTRIL) 20 MG tablet; Take 1 tablet by mouth Daily.  Dispense: 90 tablet; Refill: 1  -     omeprazole (PrilOSEC) 20 MG capsule; Take 1 capsule by mouth Daily.  Dispense: 90 capsule; Refill: 1  -     propranolol LA (Inderal LA) 60 MG 24 hr capsule; Take 1 capsule by mouth Daily.  Dispense: 90 capsule; Refill: 1  -     rizatriptan (MAXALT) 10 MG tablet; Take 1 tablet by mouth 1 (One) Time As Needed for Migraine for up to 1 dose. May repeat in 2 hours if needed  Dispense: 12 tablet; Refill: 2  -     simvastatin (ZOCOR) 20 MG tablet; Take 1 tablet by mouth Daily.  Dispense: 90 tablet; Refill: 1                 Wore goggles and face mask during entire visit.    Return in about 3 months (around 6/15/2021) for Recheck.

## 2021-03-16 ENCOUNTER — TELEPHONE (OUTPATIENT)
Dept: FAMILY MEDICINE CLINIC | Facility: CLINIC | Age: 56
End: 2021-03-16

## 2021-03-16 DIAGNOSIS — R92.8 ABNORMAL MAMMOGRAM OF BOTH BREASTS: Primary | ICD-10-CM

## 2021-03-16 LAB
BASOPHILS # BLD AUTO: 0.1 10*3/MM3 (ref 0–0.2)
BASOPHILS NFR BLD AUTO: 0.9 % (ref 0–1.5)
EOSINOPHIL # BLD AUTO: 0.57 10*3/MM3 (ref 0–0.4)
EOSINOPHIL NFR BLD AUTO: 5.1 % (ref 0.3–6.2)
ERYTHROCYTE [DISTWIDTH] IN BLOOD BY AUTOMATED COUNT: 13.5 % (ref 12.3–15.4)
HCT VFR BLD AUTO: 39.3 % (ref 34–46.6)
HGB BLD-MCNC: 12.8 G/DL (ref 12–15.9)
IMM GRANULOCYTES # BLD AUTO: 0.06 10*3/MM3 (ref 0–0.05)
IMM GRANULOCYTES NFR BLD AUTO: 0.5 % (ref 0–0.5)
LYMPHOCYTES # BLD AUTO: 2.88 10*3/MM3 (ref 0.7–3.1)
LYMPHOCYTES NFR BLD AUTO: 25.9 % (ref 19.6–45.3)
MCH RBC QN AUTO: 26.6 PG (ref 26.6–33)
MCHC RBC AUTO-ENTMCNC: 32.6 G/DL (ref 31.5–35.7)
MCV RBC AUTO: 81.5 FL (ref 79–97)
MONOCYTES # BLD AUTO: 0.84 10*3/MM3 (ref 0.1–0.9)
MONOCYTES NFR BLD AUTO: 7.6 % (ref 5–12)
NEUTROPHILS # BLD AUTO: 6.67 10*3/MM3 (ref 1.7–7)
NEUTROPHILS NFR BLD AUTO: 60 % (ref 42.7–76)
NRBC BLD AUTO-RTO: 0 /100 WBC (ref 0–0.2)
PLATELET # BLD AUTO: 377 10*3/MM3 (ref 140–450)
RBC # BLD AUTO: 4.82 10*6/MM3 (ref 3.77–5.28)
TSH SERPL DL<=0.005 MIU/L-ACNC: 2.68 UIU/ML (ref 0.27–4.2)
WBC # BLD AUTO: 11.12 10*3/MM3 (ref 3.4–10.8)

## 2021-03-23 ENCOUNTER — HOSPITAL ENCOUNTER (OUTPATIENT)
Dept: ULTRASOUND IMAGING | Facility: HOSPITAL | Age: 56
Discharge: HOME | End: 2021-03-23

## 2021-03-23 ENCOUNTER — HOSPITAL ENCOUNTER (OUTPATIENT)
Dept: MAMMOGRAPHY | Facility: HOSPITAL | Age: 56
Discharge: HOME | End: 2021-03-23

## 2021-03-23 ENCOUNTER — HOSPITAL ENCOUNTER (OUTPATIENT)
Dept: CT IMAGING | Facility: HOSPITAL | Age: 56
Discharge: HOME | End: 2021-03-23

## 2021-03-23 DIAGNOSIS — R92.8 ABNORMAL MAMMOGRAM OF BOTH BREASTS: ICD-10-CM

## 2021-03-23 DIAGNOSIS — Z87.891 PERSONAL HISTORY OF TOBACCO USE: ICD-10-CM

## 2021-03-23 PROCEDURE — 76642 ULTRASOUND BREAST LIMITED: CPT

## 2021-03-23 PROCEDURE — 71271 CT THORAX LUNG CANCER SCR C-: CPT

## 2021-03-23 PROCEDURE — 77066 DX MAMMO INCL CAD BI: CPT

## 2021-03-23 PROCEDURE — G0279 TOMOSYNTHESIS, MAMMO: HCPCS

## 2021-03-24 ENCOUNTER — TELEPHONE (OUTPATIENT)
Dept: FAMILY MEDICINE CLINIC | Facility: CLINIC | Age: 56
End: 2021-03-24

## 2021-03-24 ENCOUNTER — BULK ORDERING (OUTPATIENT)
Dept: CASE MANAGEMENT | Facility: OTHER | Age: 56
End: 2021-03-24

## 2021-03-24 DIAGNOSIS — R92.0 MICROCALCIFICATIONS OF THE BREAST: ICD-10-CM

## 2021-03-24 DIAGNOSIS — R92.8 ABNORMAL MAMMOGRAM OF BOTH BREASTS: Primary | ICD-10-CM

## 2021-03-24 DIAGNOSIS — Z23 IMMUNIZATION DUE: ICD-10-CM

## 2021-03-24 NOTE — TELEPHONE ENCOUNTER
----- Message from Vandana Gastelum MD sent at 3/24/2021  2:15 PM EDT -----  Please let Marcelle know that we need to get a biopsy on both these lesions.  Right breast--needs a stereotactic biopsy scheduled and left breast --needs an ultrasound-guided biopsy scheduled.  Thanks.    ----- Message -----  From: Kelsey Moreau PCT  Sent: 3/24/2021   1:27 PM EDT  To: Vandana Gastelum MD

## 2021-03-30 DIAGNOSIS — R91.8 MULTIPLE PULMONARY NODULES DETERMINED BY COMPUTED TOMOGRAPHY OF LUNG: Primary | ICD-10-CM

## 2021-04-07 ENCOUNTER — HOSPITAL ENCOUNTER (OUTPATIENT)
Dept: ULTRASOUND IMAGING | Facility: HOSPITAL | Age: 56
Discharge: HOME | End: 2021-04-07

## 2021-04-07 ENCOUNTER — HOSPITAL ENCOUNTER (OUTPATIENT)
Dept: MAMMOGRAPHY | Facility: HOSPITAL | Age: 56
Discharge: HOME | End: 2021-04-07

## 2021-04-07 VITALS
OXYGEN SATURATION: 97 % | RESPIRATION RATE: 16 BRPM | DIASTOLIC BLOOD PRESSURE: 94 MMHG | SYSTOLIC BLOOD PRESSURE: 151 MMHG | HEART RATE: 64 BPM

## 2021-04-07 VITALS
BODY MASS INDEX: 32.3 KG/M2 | HEIGHT: 63 IN | HEART RATE: 66 BPM | DIASTOLIC BLOOD PRESSURE: 93 MMHG | SYSTOLIC BLOOD PRESSURE: 146 MMHG | OXYGEN SATURATION: 97 % | RESPIRATION RATE: 16 BRPM | WEIGHT: 182.32 LBS | TEMPERATURE: 98 F

## 2021-04-07 DIAGNOSIS — R92.8 ABNORMAL MAMMOGRAM: ICD-10-CM

## 2021-04-07 PROCEDURE — 88305 TISSUE EXAM BY PATHOLOGIST: CPT | Performed by: FAMILY MEDICINE

## 2021-04-07 PROCEDURE — 88360 TUMOR IMMUNOHISTOCHEM/MANUAL: CPT | Performed by: FAMILY MEDICINE

## 2021-04-07 PROCEDURE — 25010000003 LIDOCAINE 1 % SOLUTION: Performed by: FAMILY MEDICINE

## 2021-04-07 PROCEDURE — 88342 IMHCHEM/IMCYTCHM 1ST ANTB: CPT | Performed by: FAMILY MEDICINE

## 2021-04-07 PROCEDURE — 25010000003 LIDOCAINE 1 % SOLUTION: Performed by: RADIOLOGY

## 2021-04-07 PROCEDURE — A4648 IMPLANTABLE TISSUE MARKER: HCPCS

## 2021-04-07 PROCEDURE — 88341 IMHCHEM/IMCYTCHM EA ADD ANTB: CPT | Performed by: FAMILY MEDICINE

## 2021-04-07 RX ORDER — DIAZEPAM 5 MG/1
5 TABLET ORAL ONCE AS NEEDED
Status: DISCONTINUED | OUTPATIENT
Start: 2021-04-07 | End: 2021-04-08 | Stop reason: HOSPADM

## 2021-04-07 RX ORDER — LIDOCAINE HYDROCHLORIDE AND EPINEPHRINE 10; 10 MG/ML; UG/ML
19 INJECTION, SOLUTION INFILTRATION; PERINEURAL ONCE
Status: COMPLETED | OUTPATIENT
Start: 2021-04-07 | End: 2021-04-07

## 2021-04-07 RX ORDER — LIDOCAINE HYDROCHLORIDE 10 MG/ML
1 INJECTION, SOLUTION INFILTRATION; PERINEURAL ONCE
Status: COMPLETED | OUTPATIENT
Start: 2021-04-07 | End: 2021-04-07

## 2021-04-07 RX ORDER — LIDOCAINE HYDROCHLORIDE 10 MG/ML
10 INJECTION, SOLUTION INFILTRATION; PERINEURAL ONCE
Status: COMPLETED | OUTPATIENT
Start: 2021-04-07 | End: 2021-04-07

## 2021-04-07 RX ORDER — LIDOCAINE HYDROCHLORIDE AND EPINEPHRINE 10; 10 MG/ML; UG/ML
10 INJECTION, SOLUTION INFILTRATION; PERINEURAL ONCE
Status: COMPLETED | OUTPATIENT
Start: 2021-04-07 | End: 2021-04-07

## 2021-04-07 RX ADMIN — LIDOCAINE HYDROCHLORIDE,EPINEPHRINE BITARTRATE 10 ML: 10; .01 INJECTION, SOLUTION INFILTRATION; PERINEURAL at 11:03

## 2021-04-07 RX ADMIN — LIDOCAINE HYDROCHLORIDE 10 ML: 10 INJECTION, SOLUTION INFILTRATION; PERINEURAL at 11:03

## 2021-04-07 RX ADMIN — LIDOCAINE HYDROCHLORIDE 1 ML: 10 INJECTION, SOLUTION INFILTRATION; PERINEURAL at 09:22

## 2021-04-07 RX ADMIN — LIDOCAINE HYDROCHLORIDE AND EPINEPHRINE 19 ML: 10; 10 INJECTION, SOLUTION INFILTRATION; PERINEURAL at 09:23

## 2021-04-07 NOTE — NURSING NOTE
Biopsy site to left upper medial breast clear with Skin Affix dry and intact. No firmness or swelling noted at or around biopsy site. Denies pain. Ice pack with protective covering applied to biopsy site. Right breast biopsy site remains unchanged. Ice pack in place to site. Wide Ace pressure wrap applied in lieu of bra per protocol (approximate 5.0 cm x 5.0 cm hematoma) present under large bruise at right breast biopsy site.  Discharge instructions discussed with understanding voiced by patient. Copies provided to patient. No distress noted. To home via private vehicle.

## 2021-04-07 NOTE — H&P
Name: Holli Patel ADMIT: 2021   : 1965  PCP: Vandana Gastelum MD    MRN: [id number] LOS: 0 days   AGE/SEX: 55 y.o. female  ROOM: Room/bed info not found       Chief complaint right breast calcifications and left breast lesion    Present Illness or Internal History:  Patient is a 55 y.o. female presents with right breast calcifications and a left breast lesion.     Past Surgical History:  Past Surgical History:   Procedure Laterality Date   • WRIST SURGERY Right        Past Medical History:  Past Medical History:   Diagnosis Date   • SHAYY (generalized anxiety disorder)     RELATED HIGH BLOOD PRESSURE   • High blood pressure     ANXIETY RELATED   • Hyperlipidemia    • Hypothyroidism    • Insomnia    • Migraine    • Rotator cuff tear    • Shoulder pain    • Shoulder pain, right        Home Medications:  (Not in a hospital admission)      Allergies:  Patient has no known allergies.    Family History:  Family History   Problem Relation Age of Onset   • Cancer Father         LYMPHATIC   • Alcohol abuse Brother    • Colon cancer Maternal Grandmother        Social History:  Social History     Tobacco Use   • Smoking status: Current Every Day Smoker     Packs/day: 1.00     Types: Electronic Cigarette   • Smokeless tobacco: Never Used   Substance Use Topics   • Alcohol use: Never   • Drug use: Never        Objective     Physical Exam:    No exam performed today,    Vital Signs  Temp:  [98 °F (36.7 °C)] 98 °F (36.7 °C)  Heart Rate:  [71] 71  Resp:  [18] 18  BP: (169)/(96) 169/96    Anticipated Surgical Procedure:  Stereotactic guided vacuum assisted right breast biopsywith clip placement and ultrasound guided vacuum assisted left breast biopsy with clip placement     The risks, benefits and alternatives of this procedure have been discussed with the patient or responsible party: Yes        Jeffrey Quevedo Jr., MD  21  10:36 EDT

## 2021-04-12 DIAGNOSIS — C50.919 MALIGNANT NEOPLASM DETERMINED BY BIOPSY OF BREAST (HCC): Primary | ICD-10-CM

## 2021-04-13 PROBLEM — C50.212 MALIGNANT NEOPLASM OF UPPER-INNER QUADRANT OF LEFT BREAST IN FEMALE, ESTROGEN RECEPTOR POSITIVE: Status: ACTIVE | Noted: 2021-04-13

## 2021-04-13 PROBLEM — Z17.0 MALIGNANT NEOPLASM OF UPPER-OUTER QUADRANT OF RIGHT BREAST IN FEMALE, ESTROGEN RECEPTOR POSITIVE: Status: ACTIVE | Noted: 2021-04-13

## 2021-04-13 PROBLEM — C50.411 MALIGNANT NEOPLASM OF UPPER-OUTER QUADRANT OF RIGHT BREAST IN FEMALE, ESTROGEN RECEPTOR POSITIVE: Status: ACTIVE | Noted: 2021-04-13

## 2021-04-13 PROBLEM — Z17.0 MALIGNANT NEOPLASM OF UPPER-INNER QUADRANT OF LEFT BREAST IN FEMALE, ESTROGEN RECEPTOR POSITIVE: Status: ACTIVE | Noted: 2021-04-13

## 2021-04-13 NOTE — PROGRESS NOTES
SURGERY  Holli Patel   1965 04/14/21    Chief Complaint: Bilateral breast cancers    HPI    Ms. Patel is a very nice 55 y.o. female who presents at the referral of Dr. Vandana Gastelum with unfortunately bilateral breast cancers that have been detected happily for screening.  Both of these are quite small to my review on the imaging, she reviewing them with me, about a centimeter each side with no clinical finding of adenopathy.  Thus it appears that she would be a stage I.    However unfortunately, she also has had a PET scan that shows a 1.1 cm left upper lobe pulmonary nodule with hypermetabolic left hilar and mediastinal adenopathy, focal area of uptake within the central canal posterior to T11-12 and subtle area of uptake within the right acetabulum without concomitant finding on CT. there are also sub-6 mm pulmonary nodules within the right lobe, indeterminate.  She has a visit with Dr. Kerr tomorrow and Dr. Snell next week..    Breast Imaging:    Screening 3/2/21:  - right lateral posterior 1/3 with micro calcifications.  - left focal asymmetry middle upper inner quadrant 1/3    Diagnostic mammography 3/23/21  - cluster micro calcifications middle third lateral  - persistence of asymmetry.    US 3/23/21  - left 10:00 4 mm irregular hypoechoic lesion    Stereotactic and US guided bx 4/7/21  - right breast 10:00 bx with bowtie clip with 4.3 cm hematoma  - left breast 10:00 bx with bowtie clip.    Pathology  - right invasive ductal carcinoma, NottinghamII, DCIS, ERPR+ her 2 melinda -  - left invasive ductal carcinoma, Faraz II. ERPR+ her 2 melinda equivocal.  FISH -     Unfortunate for her situation is the fact that her mother has dementia and she has a handicapped sister who has the mentality of about a 13-year-old.  Happily the sister can help with the mother, sitting with her, but our patient has a responsibility to take care of them both.    She is a current every day smoker, and has smoked for 40  years, pack a day.  She does not have any family history of breast cancer, and is at least a 40 D cup size, making mastectomy less attractive, the smoking making her not really an immediate reconstruction candidate.      Past Medical History:   Diagnosis Date   • Anxiety    • SHAYY (generalized anxiety disorder)     RELATED HIGH BLOOD PRESSURE   • High blood pressure     ANXIETY RELATED   • Hyperlipidemia    • Hypothyroidism    • Insomnia    • Malignant neoplasm of upper-outer quadrant of right breast in female, estrogen receptor positive (CMS/HCC) 4/13/2021   • Migraine    • Rotator cuff tear    • Shoulder pain    • Shoulder pain, right      Past Surgical History:   Procedure Laterality Date   • BREAST BIOPSY Bilateral 04/07/2021    Right Breast 10 o'clock & Left breast 10 o'clock 6 cm from nipple, [facility]   • WRIST SURGERY Right      Family History   Problem Relation Age of Onset   • Cancer Father         LYMPHATIC   • Alcohol abuse Brother    • Colon cancer Maternal Grandmother      Social History     Socioeconomic History   • Marital status: Unknown     Spouse name: Not on file   • Number of children: Not on file   • Years of education: Not on file   • Highest education level: Not on file   Tobacco Use   • Smoking status: Current Every Day Smoker     Packs/day: 1.00     Types: Electronic Cigarette, Cigarettes     Start date: 4/14/1981   • Smokeless tobacco: Never Used   Vaping Use   • Vaping Use: Never used   Substance and Sexual Activity   • Alcohol use: Never   • Drug use: Never   • Sexual activity: Yes         Current Outpatient Medications:   •  ALPRAZolam (XANAX) 0.5 MG tablet, Take 1 tablet by mouth At Night As Needed for Anxiety. for anxiety, Disp: 30 tablet, Rfl: 2  •  buPROPion XL (WELLBUTRIN XL) 150 MG 24 hr tablet, Take 1 tablet by mouth Daily., Disp: 90 tablet, Rfl: 1  •  FLUoxetine (PROzac) 40 MG capsule, Take 1 capsule by mouth Daily., Disp: 90 capsule, Rfl: 1  •  ibuprofen (ADVIL,MOTRIN) 800 MG tablet,  "Take 1 tablet by mouth 3 (Three) Times a Day As Needed for Headache., Disp: 270 tablet, Rfl: 1  •  levothyroxine (SYNTHROID, LEVOTHROID) 25 MCG tablet, Take 1 tablet by mouth Daily., Disp: 90 tablet, Rfl: 1  •  lisinopril (PRINIVIL,ZESTRIL) 20 MG tablet, Take 1 tablet by mouth Daily., Disp: 90 tablet, Rfl: 1  •  omeprazole (PrilOSEC) 20 MG capsule, Take 1 capsule by mouth Daily., Disp: 90 capsule, Rfl: 1  •  propranolol LA (Inderal LA) 60 MG 24 hr capsule, Take 1 capsule by mouth Daily., Disp: 90 capsule, Rfl: 1  •  rizatriptan (MAXALT) 10 MG tablet, Take 1 tablet by mouth 1 (One) Time As Needed for Migraine for up to 1 dose. May repeat in 2 hours if needed, Disp: 12 tablet, Rfl: 2  •  simvastatin (ZOCOR) 20 MG tablet, Take 1 tablet by mouth Daily., Disp: 90 tablet, Rfl: 1  •  hydrOXYzine pamoate (Vistaril) 25 MG capsule, Take 1 capsule by mouth 3 (Three) Times a Day As Needed for Itching., Disp: 60 capsule, Rfl: 5  No current facility-administered medications for this visit.    No Known Allergies    Review of Systems  Positive for shortness of breath nervousness and anxiety.  All other systems reviewed and negative  Vitals:    04/14/21 1501   Weight: 79.4 kg (175 lb)   Height: 160 cm (63\")       PHYSICAL EXAM:    Ht 160 cm (63\")   Wt 79.4 kg (175 lb)   Breastfeeding No   BMI 31.00 kg/m²   Body mass index is 31 kg/m².    Constitutional: well developed, well nourished, very pleasant, especially considering the situation, appears stated age  Eyes: sclera nonicteric, conjunctiva not injected   ENMT: Hearing intact, trachea midline, dentitia not seen with mask in place  CVS: RRR, no murmur, peripheral edema not present  Respiratory: CTA, normal respiratory effort   Gastrointestinal: no hepatosplenomegaly, abdomen soft, nontender  Musculoskeletal: gait normal, muscle mass normal  Skin: warm and dry, no rashes visible.  Breast skin in relatively good condition  Neurological: awake and alert, seems to have reasonable " capacity for understanding for medical decision making  Psychiatric: appears to have reasonable judgement   Lymphatics: no cervical adenopathy or axillary adenopathy.  Breasts: Right breast with large hematoma extending over at least half of the outer breast, tender, left breast with small incisional scar in no palpable hematoma or mass    Radiographic Findings: As above.  We reviewed the mammogram and ultrasound together today in the office    Lab: Pathology as above, ER/HI positive HER-2/melinda negative both sides    Pamphlet reviewed: Breast cancer    IMPRESSION:  · Calcifications right breast, measured 1 cm by me, but with about a 2 cm stretch of calcifications outside of that that could be vascular or something else.  Large hematoma at the site.  Invasive ductal carcinoma [place] 2, ER/HI positive HER-2/melinda negative  · Asymmetry left breast 10:00, invasive ductal carcinoma, ER/HI positive HER-2/melinda negative by FISH, measured at about a centimeter as well.  · PET scan with 1 cm left lung nodule with mediastinal adenopathy  · Clinical stage I bilateral breast cancer if in fact the PET scan reveals a lung primary rather than a metastatic situation.  I favor that this is likely 2 separate breast primaries and one lung primary all at the same time  · Smoker which makes her not a candidate for immediate reconstruction  · Anxiety on Xanax  · Social difficulty and that she cares for both her demented mother and her handicapped sister    PLAN:  · Prior to learning about her PET scan, that just having been done today, we had planned a bilateral breast MRI, my discussing with her the potential option of bilateral lumpectomies with radiation, should radiation oncology feels she is an appropriate candidate.  After learning of the PET scan reading, I brought her back in to the consulting room and told her that I thought we should hold off on the MRI until she sees Dr. Kerr and that if in fact this is a separate lung  primary she should probably address that first.  She is agreeable.  · Genetics visit and genetic testing  · Discussed the need for sentinel node biopsy bilateral, no logistics of such.  · Discussed the need for radiation postoperatively and visit with radiation oncology.  · Discussed the need for visit with medical oncology and the likelihood that she would receive hormonal manipulation since she is ER/SD positive and the absence of use of Herceptin.  · All of this will get complicated and I have asked that she have Dr. Kerr call me after he sees her so that we can come up with a good team plan, which of course will involve Dr. Snell as well.  · Stage this clinically after we determine what is going on with the lung    Breanna Molina MD  04/14/21  16:55 EDT    Greater than 1 hour face-to-face with over half in counseling.    In order to provide a more personal and interactive patient experience as well as improve efficiency, this note was started prior to the office visit.

## 2021-04-14 ENCOUNTER — HOSPITAL ENCOUNTER (OUTPATIENT)
Dept: PET IMAGING | Facility: HOSPITAL | Age: 56
Discharge: HOME | End: 2021-04-14

## 2021-04-14 ENCOUNTER — OFFICE VISIT (OUTPATIENT)
Dept: SURGERY | Facility: CLINIC | Age: 56
End: 2021-04-14

## 2021-04-14 VITALS — BODY MASS INDEX: 31.01 KG/M2 | HEIGHT: 63 IN | WEIGHT: 175 LBS

## 2021-04-14 DIAGNOSIS — C50.212 MALIGNANT NEOPLASM OF UPPER-INNER QUADRANT OF LEFT BREAST IN FEMALE, ESTROGEN RECEPTOR POSITIVE (HCC): Primary | ICD-10-CM

## 2021-04-14 DIAGNOSIS — C50.411 MALIGNANT NEOPLASM OF UPPER-OUTER QUADRANT OF RIGHT BREAST IN FEMALE, ESTROGEN RECEPTOR POSITIVE (HCC): ICD-10-CM

## 2021-04-14 DIAGNOSIS — R91.8 MULTIPLE PULMONARY NODULES DETERMINED BY COMPUTED TOMOGRAPHY OF LUNG: ICD-10-CM

## 2021-04-14 DIAGNOSIS — Z17.0 MALIGNANT NEOPLASM OF UPPER-INNER QUADRANT OF LEFT BREAST IN FEMALE, ESTROGEN RECEPTOR POSITIVE (HCC): Primary | ICD-10-CM

## 2021-04-14 DIAGNOSIS — Z17.0 MALIGNANT NEOPLASM OF UPPER-OUTER QUADRANT OF RIGHT BREAST IN FEMALE, ESTROGEN RECEPTOR POSITIVE (HCC): ICD-10-CM

## 2021-04-14 LAB
CYTO UR: NORMAL
GLUCOSE BLDC GLUCOMTR-MCNC: 110 MG/DL (ref 70–130)
LAB AP CASE REPORT: NORMAL
LAB AP CLINICAL INFORMATION: NORMAL
LAB AP DIAGNOSIS COMMENT: NORMAL
LAB AP SPECIAL STAINS: NORMAL
LAB AP SYNOPTIC CHECKLIST: NORMAL
PATH REPORT.ADDENDUM SPEC: NORMAL
PATH REPORT.FINAL DX SPEC: NORMAL
PATH REPORT.GROSS SPEC: NORMAL

## 2021-04-14 PROCEDURE — 0 FLUDEOXYGLUCOSE F18 SOLUTION: Performed by: FAMILY MEDICINE

## 2021-04-14 PROCEDURE — 78815 PET IMAGE W/CT SKULL-THIGH: CPT

## 2021-04-14 PROCEDURE — 82962 GLUCOSE BLOOD TEST: CPT

## 2021-04-14 PROCEDURE — 99244 OFF/OP CNSLTJ NEW/EST MOD 40: CPT | Performed by: SURGERY

## 2021-04-14 PROCEDURE — A9552 F18 FDG: HCPCS | Performed by: FAMILY MEDICINE

## 2021-04-14 RX ADMIN — FLUDEOXYGLUCOSE F18 1 DOSE: 300 INJECTION INTRAVENOUS at 09:40

## 2021-04-15 ENCOUNTER — APPOINTMENT (OUTPATIENT)
Dept: OTHER | Facility: HOSPITAL | Age: 56
End: 2021-04-15

## 2021-04-21 ENCOUNTER — APPOINTMENT (OUTPATIENT)
Dept: LAB | Facility: HOSPITAL | Age: 56
End: 2021-04-21

## 2021-04-22 ENCOUNTER — OFFICE VISIT (OUTPATIENT)
Dept: OTHER | Facility: HOSPITAL | Age: 56
End: 2021-04-22

## 2021-04-22 ENCOUNTER — PREP FOR SURGERY (OUTPATIENT)
Dept: OTHER | Facility: HOSPITAL | Age: 56
End: 2021-04-22

## 2021-04-22 VITALS
OXYGEN SATURATION: 96 % | TEMPERATURE: 95.6 F | DIASTOLIC BLOOD PRESSURE: 106 MMHG | SYSTOLIC BLOOD PRESSURE: 165 MMHG | HEART RATE: 72 BPM | HEIGHT: 63 IN | RESPIRATION RATE: 18 BRPM | WEIGHT: 175.5 LBS | BODY MASS INDEX: 31.1 KG/M2

## 2021-04-22 DIAGNOSIS — R91.1 NODULE OF UPPER LOBE OF LEFT LUNG: Primary | ICD-10-CM

## 2021-04-22 DIAGNOSIS — R59.0 MEDIASTINAL ADENOPATHY: ICD-10-CM

## 2021-04-22 PROCEDURE — G0463 HOSPITAL OUTPT CLINIC VISIT: HCPCS

## 2021-04-22 PROCEDURE — 99245 OFF/OP CONSLTJ NEW/EST HI 55: CPT | Performed by: THORACIC SURGERY (CARDIOTHORACIC VASCULAR SURGERY)

## 2021-04-22 RX ORDER — SODIUM CHLORIDE 0.9 % (FLUSH) 0.9 %
3 SYRINGE (ML) INJECTION EVERY 12 HOURS SCHEDULED
Status: CANCELLED | OUTPATIENT
Start: 2021-04-30

## 2021-04-22 RX ORDER — GABAPENTIN 300 MG/1
600 CAPSULE ORAL ONCE
Status: CANCELLED | OUTPATIENT
Start: 2021-04-30 | End: 2021-04-22

## 2021-04-22 RX ORDER — SODIUM CHLORIDE 0.9 % (FLUSH) 0.9 %
3-10 SYRINGE (ML) INJECTION AS NEEDED
Status: CANCELLED | OUTPATIENT
Start: 2021-04-30

## 2021-04-22 RX ORDER — CELECOXIB 200 MG/1
200 CAPSULE ORAL ONCE
Status: CANCELLED | OUTPATIENT
Start: 2021-04-30 | End: 2021-04-22

## 2021-04-22 RX ORDER — ACETAMINOPHEN 500 MG
1000 TABLET ORAL ONCE
Status: CANCELLED | OUTPATIENT
Start: 2021-04-30 | End: 2021-04-22

## 2021-04-22 RX ORDER — CEFAZOLIN SODIUM 2 G/100ML
2 INJECTION, SOLUTION INTRAVENOUS ONCE
Status: CANCELLED | OUTPATIENT
Start: 2021-04-30 | End: 2021-04-22

## 2021-04-22 NOTE — PROGRESS NOTES
Subjective   Patient ID: Holli Patel is a 55 y.o. female is being seen for consultation today at the request of Vandana Gastelum MD    History of Present Illness  Dear Colleague,  Holli Patel was seen in the lung care center at [facility] today April 22, 2021 as part of our multidisciplinary thoracic oncology clinic.  I have reviewed her history her x-rays and have examined her.  Thank you for asking us to participate in the care of     Patient is a 55-year-old  female.  She has been off of work because of the COVID-19 pandemic.  With this she has gained over 20 pounds.  She has been a lifelong smoker.  She began smoking at age 14.  She is smoked 2 packs of cigarettes per day since then.  She noticed some increase in shortness of breath.  She was seen by her primary care physician and a battery of tests were performed.  She had a mammogram which showed bilateral breast cancer.  She had a low-dose screening CT that showed a nodule in the left upper lobe of the lung and mediastinal lymphadenopathy.  She has had biopsies of the breast showing cancer and is undergoing a genetic work-up.  She has had a CT PET scan the shows the lung nodule and lymph nodes to be PET positive.  She has been referred here for further evaluation of these nodules.    She does get short of breath climbing steps.  She has no shortness of breath walking on flat ground.  She reports no cough or hemoptysis.  She has no hoarseness or change in her voice.  She has no pleuritic pain.  There has been no wheezing.  She has a family history of cancer.    The following portions of the patient's history were reviewed and updated as appropriate: allergies, current medications, past family history, past medical history, past social history, past surgical history and problem list.  Review of Systems   Constitutional: Positive for weight gain.   HENT: Negative.    Eyes: Negative.    Cardiovascular: Negative.     Respiratory: Positive for shortness of breath.    Endocrine: Negative.    Hematologic/Lymphatic: Negative.    Skin: Negative.    Musculoskeletal: Negative.    Gastrointestinal: Negative.    Genitourinary: Negative.    Neurological: Negative.    Psychiatric/Behavioral: Negative.    All other systems reviewed and are negative.    Patient Active Problem List   Diagnosis   • History of gastroesophageal reflux (GERD)   • Primary insomnia   • Mixed hyperlipidemia   • Other specified hypothyroidism   • Arthritis   • Anxiety   • History of migraine   • Intractable migraine with aura without status migrainosus   • Essential hypertension   • History of colon polyps   • Diverticulosis   • Leukocytosis   • Personal history of tobacco use   • Osteopenia   • Dyspnea   • Weight gain   • Malignant neoplasm of upper-outer quadrant of right breast in female, estrogen receptor positive (CMS/HCC)   • Malignant neoplasm of upper-inner quadrant of left breast in female, estrogen receptor positive (CMS/HCC)   • Lung nodule     Past Medical History:   Diagnosis Date   • Anxiety    • SHAYY (generalized anxiety disorder)     RELATED HIGH BLOOD PRESSURE   • High blood pressure     ANXIETY RELATED   • Hyperlipidemia    • Hypothyroidism    • Insomnia    • Lung nodule    • Malignant neoplasm of upper-outer quadrant of right breast in female, estrogen receptor positive (CMS/HCC) 4/13/2021   • Migraine    • Rotator cuff tear    • Shoulder pain    • Shoulder pain, right      Past Surgical History:   Procedure Laterality Date   • BREAST BIOPSY Bilateral 04/07/2021    Right Breast 10 o'clock & Left breast 10 o'clock 6 cm from nipple, [facility]   • WRIST SURGERY Right      Family History   Problem Relation Age of Onset   • Cancer Father         LYMPHATIC   • Alcohol abuse Brother    • Colon cancer Maternal Grandmother      Social History     Socioeconomic History   • Marital status: Unknown     Spouse name: Not on file   • Number of children: Not on file    • Years of education: Not on file   • Highest education level: Not on file   Tobacco Use   • Smoking status: Current Every Day Smoker     Packs/day: 1.00     Types: Electronic Cigarette, Cigarettes     Start date: 4/14/1981   • Smokeless tobacco: Never Used   Vaping Use   • Vaping Use: Never used   Substance and Sexual Activity   • Alcohol use: Never   • Drug use: Never   • Sexual activity: Yes       Current Outpatient Medications:   •  ALPRAZolam (XANAX) 0.5 MG tablet, Take 1 tablet by mouth At Night As Needed for Anxiety. for anxiety, Disp: 30 tablet, Rfl: 2  •  buPROPion XL (WELLBUTRIN XL) 150 MG 24 hr tablet, Take 1 tablet by mouth Daily., Disp: 90 tablet, Rfl: 1  •  FLUoxetine (PROzac) 40 MG capsule, Take 1 capsule by mouth Daily., Disp: 90 capsule, Rfl: 1  •  hydrOXYzine pamoate (Vistaril) 25 MG capsule, Take 1 capsule by mouth 3 (Three) Times a Day As Needed for Itching., Disp: 60 capsule, Rfl: 5  •  ibuprofen (ADVIL,MOTRIN) 800 MG tablet, Take 1 tablet by mouth 3 (Three) Times a Day As Needed for Headache., Disp: 270 tablet, Rfl: 1  •  levothyroxine (SYNTHROID, LEVOTHROID) 25 MCG tablet, Take 1 tablet by mouth Daily., Disp: 90 tablet, Rfl: 1  •  lisinopril (PRINIVIL,ZESTRIL) 20 MG tablet, Take 1 tablet by mouth Daily., Disp: 90 tablet, Rfl: 1  •  omeprazole (PrilOSEC) 20 MG capsule, Take 1 capsule by mouth Daily., Disp: 90 capsule, Rfl: 1  •  propranolol LA (Inderal LA) 60 MG 24 hr capsule, Take 1 capsule by mouth Daily., Disp: 90 capsule, Rfl: 1  •  rizatriptan (MAXALT) 10 MG tablet, Take 1 tablet by mouth 1 (One) Time As Needed for Migraine for up to 1 dose. May repeat in 2 hours if needed, Disp: 12 tablet, Rfl: 2  •  simvastatin (ZOCOR) 20 MG tablet, Take 1 tablet by mouth Daily., Disp: 90 tablet, Rfl: 1  No Known Allergies     Objective   Vitals:    04/22/21 0910   BP: (!) 165/106   Pulse: 72   Resp: 18   Temp: 95.6 °F (35.3 °C)   SpO2: 96%     Physical Exam  Constitutional:       Appearance: Normal  appearance. She is well-developed.   HENT:      Head: Normocephalic.   Eyes:      General: Lids are normal.      Conjunctiva/sclera: Conjunctivae normal.      Pupils: Pupils are equal, round, and reactive to light.   Neck:      Thyroid: No thyroid mass or thyromegaly.      Vascular: No carotid bruit, hepatojugular reflux or JVD.      Trachea: Trachea normal.   Cardiovascular:      Rate and Rhythm: Normal rate and regular rhythm.  No extrasystoles are present.     Chest Wall: PMI is not displaced.      Pulses: Normal pulses.      Heart sounds: Normal heart sounds, S1 normal and S2 normal.   Pulmonary:      Effort: Pulmonary effort is normal.      Breath sounds: Normal breath sounds.   Abdominal:      General: Bowel sounds are normal.      Palpations: Abdomen is soft. There is no mass.      Tenderness: There is no abdominal tenderness.      Hernia: No hernia is present.   Musculoskeletal:         General: Normal range of motion.      Cervical back: Normal range of motion and neck supple.   Skin:     General: Skin is warm and dry.   Neurological:      Mental Status: She is alert and oriented to person, place, and time.      Cranial Nerves: No cranial nerve deficit.      Sensory: No sensory deficit.      Deep Tendon Reflexes: Reflexes are normal and symmetric.   Psychiatric:         Speech: Speech normal.         Behavior: Behavior normal.         Thought Content: Thought content normal.         Judgment: Judgment normal.       Independent Review of Radiographic Studies:    CT PET scan performed April 14, 2021 was independently reviewed.  There is 11 mm nodule in the medial apical aspect of the left upper lobe of the lung near the aortic arch which is hypermetabolic with an SUV of 9.7.  There are scattered sub-6 mm nodules throughout both lungs which are obviously not hypermetabolic.  There are hypermetabolic left hilar and left AP window lymph nodes.    Assessment/Plan   Assessment:  The left upper lobe lung nodule is  indeterminate.  This could certainly represent metastatic disease from her breast cancer but this seems unlikely.  Given her smoking history this could represent a primary lung cancer with imelda metastases.  Granulomatous disease is not ruled out.  Where the nodule and lymph nodes are located biopsy is going to be difficult at best.  I think the most efficient approach would be a left VAT with wedge resection of the left upper lobe nodule and a direct biopsy of the hilar and mediastinal lymph nodes.  I have explained to the patient and her  robot-assisted thoracic surgery with wedge resection of the left upper lobe nodule and biopsy of the mediastinal lymph nodes.  We have discussed the procedure as well as the risks and benefits.  I also discussed with them alternative forms of treatment and their prognosis.  All of their questions were answered to their satisfaction.  The patient is requested that we proceed.    Plan:  Schedule the patient for robot-assisted wedge resection left upper lobe nodule and mediastinal lymph node biopsy.  Case request and preoperative orders have been placed in epic.  I will keep you informed of her progress.  Thank you for allowing us to participate in the care of Ms. Patel.    I spent 46minutes reviewing the patient's chart and x-rays as well as face-to-face encounter for history and exam as well as critical medical decision making.    Diagnoses and all orders for this visit:    Nodule of upper lobe of left lung  -     Case request    Mediastinal adenopathy  -     Case request

## 2021-04-23 ENCOUNTER — TELEPHONE (OUTPATIENT)
Dept: SURGERY | Facility: CLINIC | Age: 56
End: 2021-04-23

## 2021-04-23 ENCOUNTER — TRANSCRIBE ORDERS (OUTPATIENT)
Dept: PREADMISSION TESTING | Facility: HOSPITAL | Age: 56
End: 2021-04-23

## 2021-04-23 ENCOUNTER — TELEPHONE (OUTPATIENT)
Dept: FAMILY MEDICINE CLINIC | Facility: CLINIC | Age: 56
End: 2021-04-23

## 2021-04-23 DIAGNOSIS — Z01.818 OTHER SPECIFIED PRE-OPERATIVE EXAMINATION: Primary | ICD-10-CM

## 2021-04-23 NOTE — PATIENT INSTRUCTIONS
"Pt seen by  Dr. Kerr and will be scheduled for robot-assisted thoracic surgery with wedge resection of the left upper lobe nodule and biopsy of the mediastinal lymph nodes. Pt given VATS brochure, \"What to Expect\" VATS/Thoracotomy handout with discharge instructions along with ERAS pamphlet.  Pt instructed to call nurse navigator with any questions or concerns. Pt given contact cards for Dr. Kerr and nurse navigator.    "

## 2021-04-23 NOTE — TELEPHONE ENCOUNTER
Spoke with patient due to having vaccine scheduled at noon today. She said that Dr. Kerr recommended her to get it and she is going to go ahead and receive her first dose today. Disregard original note.

## 2021-04-23 NOTE — TELEPHONE ENCOUNTER
Would like to know if any chance you could up Xanax dose a little she is having a terrible time sleeping with all she is going through please advise

## 2021-04-23 NOTE — TELEPHONE ENCOUNTER
Patients  is wanting to get an okay for Covid Vaccine. Patient is scheduled today for 1 st dose at noon. He is concerned because she still has one breast that is black and blue where a blood vessel was broken. Please advise. She is 2 weeks out from US guided biopsy and scheduled for surgery on 4/30/21.

## 2021-04-26 ENCOUNTER — PRE-ADMISSION TESTING (OUTPATIENT)
Dept: PREADMISSION TESTING | Facility: HOSPITAL | Age: 56
End: 2021-04-26

## 2021-04-26 VITALS
TEMPERATURE: 97.9 F | HEIGHT: 63 IN | BODY MASS INDEX: 31.36 KG/M2 | RESPIRATION RATE: 18 BRPM | SYSTOLIC BLOOD PRESSURE: 140 MMHG | DIASTOLIC BLOOD PRESSURE: 94 MMHG | WEIGHT: 177 LBS | OXYGEN SATURATION: 96 % | HEART RATE: 69 BPM

## 2021-04-26 DIAGNOSIS — R91.1 NODULE OF UPPER LOBE OF LEFT LUNG: ICD-10-CM

## 2021-04-26 DIAGNOSIS — F41.9 ANXIETY: ICD-10-CM

## 2021-04-26 LAB
ABO GROUP BLD: NORMAL
ANION GAP SERPL CALCULATED.3IONS-SCNC: 9.8 MMOL/L (ref 5–15)
BASOPHILS # BLD AUTO: 0.1 10*3/MM3 (ref 0–0.2)
BASOPHILS NFR BLD AUTO: 1.2 % (ref 0–1.5)
BLD GP AB SCN SERPL QL: NEGATIVE
BUN SERPL-MCNC: 17 MG/DL (ref 6–20)
BUN/CREAT SERPL: 21.5 (ref 7–25)
CALCIUM SPEC-SCNC: 9.5 MG/DL (ref 8.6–10.5)
CHLORIDE SERPL-SCNC: 103 MMOL/L (ref 98–107)
CO2 SERPL-SCNC: 27.2 MMOL/L (ref 22–29)
CREAT SERPL-MCNC: 0.79 MG/DL (ref 0.57–1)
DEPRECATED RDW RBC AUTO: 41.2 FL (ref 37–54)
EOSINOPHIL # BLD AUTO: 0.45 10*3/MM3 (ref 0–0.4)
EOSINOPHIL NFR BLD AUTO: 5.3 % (ref 0.3–6.2)
ERYTHROCYTE [DISTWIDTH] IN BLOOD BY AUTOMATED COUNT: 13.9 % (ref 12.3–15.4)
GFR SERPL CREATININE-BSD FRML MDRD: 75 ML/MIN/1.73
GLUCOSE SERPL-MCNC: 101 MG/DL (ref 65–99)
HCT VFR BLD AUTO: 40 % (ref 34–46.6)
HGB BLD-MCNC: 12.9 G/DL (ref 12–15.9)
IMM GRANULOCYTES # BLD AUTO: 0.03 10*3/MM3 (ref 0–0.05)
IMM GRANULOCYTES NFR BLD AUTO: 0.4 % (ref 0–0.5)
INR PPP: 0.92 (ref 0.9–1.1)
LYMPHOCYTES # BLD AUTO: 2.31 10*3/MM3 (ref 0.7–3.1)
LYMPHOCYTES NFR BLD AUTO: 27.1 % (ref 19.6–45.3)
MCH RBC QN AUTO: 26.6 PG (ref 26.6–33)
MCHC RBC AUTO-ENTMCNC: 32.3 G/DL (ref 31.5–35.7)
MCV RBC AUTO: 82.5 FL (ref 79–97)
MONOCYTES # BLD AUTO: 0.66 10*3/MM3 (ref 0.1–0.9)
MONOCYTES NFR BLD AUTO: 7.7 % (ref 5–12)
NEUTROPHILS NFR BLD AUTO: 4.98 10*3/MM3 (ref 1.7–7)
NEUTROPHILS NFR BLD AUTO: 58.3 % (ref 42.7–76)
NRBC BLD AUTO-RTO: 0 /100 WBC (ref 0–0.2)
PLATELET # BLD AUTO: 310 10*3/MM3 (ref 140–450)
PMV BLD AUTO: 9.8 FL (ref 6–12)
POTASSIUM SERPL-SCNC: 4.4 MMOL/L (ref 3.5–5.2)
PROTHROMBIN TIME: 12.2 SECONDS (ref 11.7–14.2)
QT INTERVAL: 400 MS
RBC # BLD AUTO: 4.85 10*6/MM3 (ref 3.77–5.28)
RH BLD: POSITIVE
SODIUM SERPL-SCNC: 140 MMOL/L (ref 136–145)
T&S EXPIRATION DATE: NORMAL
WBC # BLD AUTO: 8.53 10*3/MM3 (ref 3.4–10.8)

## 2021-04-26 PROCEDURE — 93010 ELECTROCARDIOGRAM REPORT: CPT | Performed by: INTERNAL MEDICINE

## 2021-04-26 PROCEDURE — 86850 RBC ANTIBODY SCREEN: CPT

## 2021-04-26 PROCEDURE — 86900 BLOOD TYPING SEROLOGIC ABO: CPT

## 2021-04-26 PROCEDURE — 86901 BLOOD TYPING SEROLOGIC RH(D): CPT

## 2021-04-26 PROCEDURE — 36415 COLL VENOUS BLD VENIPUNCTURE: CPT

## 2021-04-26 PROCEDURE — 85025 COMPLETE CBC W/AUTO DIFF WBC: CPT

## 2021-04-26 PROCEDURE — 93005 ELECTROCARDIOGRAM TRACING: CPT

## 2021-04-26 PROCEDURE — 80048 BASIC METABOLIC PNL TOTAL CA: CPT

## 2021-04-26 PROCEDURE — 85610 PROTHROMBIN TIME: CPT

## 2021-04-26 RX ORDER — ALPRAZOLAM 0.5 MG/1
0.5 TABLET ORAL NIGHTLY PRN
Qty: 45 TABLET | Refills: 1 | Status: SHIPPED | OUTPATIENT
Start: 2021-04-26 | End: 2021-05-19 | Stop reason: SDUPTHER

## 2021-04-26 RX ORDER — CHLORHEXIDINE GLUCONATE 500 MG/1
CLOTH TOPICAL TAKE AS DIRECTED
COMMUNITY
End: 2021-04-28

## 2021-04-26 NOTE — DISCHARGE INSTRUCTIONS
Take the following medications the morning of surgery: PRILOSEC, SYNTHROID, WELLBUTRIN, AND PROZAC    ARRIVE  AM    If you are on prescription narcotic pain medication to control your pain you may also take that medication the morning of surgery.    General Instructions:  • Do not eat solid food after midnight the night before surgery.  • You may drink clear liquids day of surgery but must stop at least one hour before your hospital arrival time.  • It is beneficial for you to have a clear drink that contains carbohydrates the day of surgery.  We suggest a 12 to 20 ounce bottle of Gatorade or Powerade for non-diabetic patients or a 12 to 20 ounce bottle of G2 or Powerade Zero for diabetic patients. (Pediatric patients, are not advised to drink a 12 to 20 ounce carbohydrate drink)    Clear liquids are liquids you can see through.  Nothing red in color.     Plain water                               Sports drinks  Sodas                                   Gelatin (Jell-O)  Fruit juices without pulp such as white grape juice and apple juice  Popsicles that contain no fruit or yogurt  Tea or coffee (no cream or milk added)  Gatorade / Powerade  G2 / Powerade Zero    • Infants may have breast milk up to four hours before surgery.  • Infants drinking formula may drink formula up to six hours before surgery.   • Patients who avoid smoking, chewing tobacco and alcohol for 4 weeks prior to surgery have a reduced risk of post-operative complications.  Quit smoking as many days before surgery as you can.  • Do not smoke, use chewing tobacco or drink alcohol the day of surgery.   • If applicable bring your C-PAP/ BI-PAP machine.  • Bring any papers given to you in the doctor’s office.  • Wear clean comfortable clothes.  • Do not wear contact lenses, false eyelashes or make-up.  Bring a case for your glasses.   • Bring crutches or walker if applicable.  • Remove all piercings.  Leave jewelry and any other valuables at  home.  • Hair extensions with metal clips must be removed prior to surgery.  • The Pre-Admission Testing nurse will instruct you to bring medications if unable to obtain an accurate list in Pre-Admission Testing.        If you were given a blood bank ID arm band remember to bring it with you the day of surgery.    Preventing a Surgical Site Infection:  • For 2 to 3 days before surgery, avoid shaving with a razor because the razor can irritate skin and make it easier to develop an infection.    • Any areas of open skin can increase the risk of a post-operative wound infection by allowing bacteria to enter and travel throughout the body.  Notify your surgeon if you have any skin wounds / rashes even if it is not near the expected surgical site.  The area will need assessed to determine if surgery should be delayed until it is healed.  • The night prior to surgery shower using a fresh bar of anti-bacterial soap (such as Dial) and clean washcloth.  Sleep in a clean bed with clean clothing.  Do not allow pets to sleep with you.  • Shower on the morning of surgery using a fresh bar of anti-bacterial soap (such as Dial) and clean washcloth.  Dry with a clean towel and dress in clean clothing.  • Ask your surgeon if you will be receiving antibiotics prior to surgery.  • Make sure you, your family, and all healthcare providers clean their hands with soap and water or an alcohol based hand  before caring for you or your wound.    Day of surgery:  Your arrival time is approximately two hours before your scheduled surgery time.  Upon arrival, a Pre-op nurse and Anesthesiologist will review your health history, obtain vital signs, and answer questions you may have.  The only belongings needed at this time will be a list of your home medications and if applicable your C-PAP/BI-PAP machine.  A Pre-op nurse will start an IV and you may receive medication in preparation for surgery, including something to help you relax.      Please be aware that surgery does come with discomfort.  We want to make every effort to control your discomfort so please discuss any uncontrolled symptoms with your nurse.   Your doctor will most likely have prescribed pain medications.      If you are going home after surgery you will receive individualized written care instructions before being discharged.  A responsible adult must drive you to and from the hospital on the day of your surgery and stay with you for 24 hours.  Discharge prescriptions can be filled by the hospital pharmacy during regular pharmacy hours.  If you are having surgery late in the day/evening your prescription may be e-prescribed to your pharmacy.  Please verify your pharmacy hours or chose a 24 hour pharmacy to avoid not having access to your prescription because your pharmacy has closed for the day.    If you are staying overnight following surgery, you will be transported to your hospital room following the recovery period.  [facility] has all private rooms.    If you have any questions please call Pre-Admission Testing at [phone].  Deductibles and co-payments are collected on the day of service. Please be prepared to pay the required co-pay, deductible or deposit on the day of service as defined by your plan.    Patient Education for Self-Quarantine Process    Following your COVID testing, we strongly recommend that you do not leave your home after you have been tested for COVID except to get medical care. This includes not going to work, school or to public areas.  If this is not possible for you to do please limit your activities to only required outings.  Be sure to wear a mask when you are with other people, practice social distancing and wash your hands frequently.      The following items provide additional details to keep you safe.  • Wash your hands with soap and water frequently for at least 20 seconds.   • Avoid touching your eyes, nose and mouth  with unwashed hands.  • Do not share anything - utensils, towels, food from the same bowl.   • Have your own utensils, drinking glass, dishes, towels and bedding.   • Do not have visitors.   • Do use FaceTime to stay in touch with family and friends.  • You should stay in a specific room away from others if possible.   • Stay at least 6 feet away from others in the home if you cannot have a dedicated room to yourself.   • Do not snuggle with your pet. While the CDC says there is no evidence that pets can spread COVID-19 or be infected from humans, it is probably best to avoid “petting, snuggling, being kissed or licked and sharing food (during self-quarantine)”, according to the CDC.   • Sanitize household surfaces daily. Include all high touch areas (door handles, light switches, phones, countertops, etc.)  • Do not share a bathroom with others, if possible.   • Wear a mask around others in your home if you are unable to stay in a separate room or 6 feet apart. If  you are unable to wear a mask, have your family member wear a mask if they must be within 6 feet of you.   Call your surgeon immediately if you experience any of the following symptoms:  • Sore Throat  • Shortness of Breath or difficulty breathing  • Cough  • Chills  • Body soreness or muscle pain  • Headache  • Fever  • New loss of taste or smell  • Do not arrive for your surgery ill.  Your procedure will need to be rescheduled to another time.  You will need to call your physician before the day of surgery to avoid any unnecessary exposure to hospital staff as well as other patients.    CHLORHEXIDINE CLOTH INSTRUCTIONS  The morning of surgery follow these instructions using the Chlorhexidine cloths you've been given.  These steps reduce bacteria on the body.  Do not use the cloths near your eyes, ears mouth, genitalia or on open wounds.  Throw the cloths away after use but do not try to flush them down a toilet.      • Open and remove one cloth at a  time from the package.    • Leave the cloth unfolded and begin the bathing.  • Massage the skin with the cloths using gentle pressure to remove bacteria.  Do not scrub harshly.   • Follow the steps below with one 2% CHG cloth per area (6 total cloths).  • One cloth for neck, shoulders and chest.  • One cloth for both arms, hands, fingers and underarms (do underarms last).  • One cloth for the abdomen followed by groin.  • One cloth for right leg and foot including between the toes.  • One cloth for left leg and foot including between the toes.  • The last cloth is to be used for the back of the neck, back and buttocks.    Allow the CHG to air dry 3 minutes on the skin which will give it time to work and decrease the chance of irritation.  The skin may feel sticky until it is dry.  Do not rinse with water or any other liquid or you will lose the beneficial effects of the CHG.  If mild skin irritation occurs, do rinse the skin to remove the CHG.  Report this to the nurse at time of admission.  Do not apply lotions, creams, ointments, deodorants or perfumes after using the clothes. Dress in clean clothes before coming to the hospital.

## 2021-04-28 ENCOUNTER — LAB (OUTPATIENT)
Dept: LAB | Facility: HOSPITAL | Age: 56
End: 2021-04-28

## 2021-04-28 ENCOUNTER — CONSULT (OUTPATIENT)
Dept: ONCOLOGY | Facility: CLINIC | Age: 56
End: 2021-04-28

## 2021-04-28 ENCOUNTER — TELEPHONE (OUTPATIENT)
Dept: ONCOLOGY | Facility: CLINIC | Age: 56
End: 2021-04-28

## 2021-04-28 VITALS
OXYGEN SATURATION: 95 % | RESPIRATION RATE: 20 BRPM | HEIGHT: 63 IN | DIASTOLIC BLOOD PRESSURE: 89 MMHG | HEART RATE: 91 BPM | WEIGHT: 175.9 LBS | TEMPERATURE: 97.6 F | BODY MASS INDEX: 31.17 KG/M2 | SYSTOLIC BLOOD PRESSURE: 149 MMHG

## 2021-04-28 DIAGNOSIS — Z01.818 OTHER SPECIFIED PRE-OPERATIVE EXAMINATION: ICD-10-CM

## 2021-04-28 DIAGNOSIS — Z87.19 HISTORY OF GASTROESOPHAGEAL REFLUX (GERD): Primary | ICD-10-CM

## 2021-04-28 DIAGNOSIS — C50.411 MALIGNANT NEOPLASM OF UPPER-OUTER QUADRANT OF RIGHT BREAST IN FEMALE, ESTROGEN RECEPTOR POSITIVE (HCC): ICD-10-CM

## 2021-04-28 DIAGNOSIS — Z17.0 MALIGNANT NEOPLASM OF UPPER-OUTER QUADRANT OF RIGHT BREAST IN FEMALE, ESTROGEN RECEPTOR POSITIVE (HCC): ICD-10-CM

## 2021-04-28 LAB
BASOPHILS # BLD AUTO: 0.07 10*3/MM3 (ref 0–0.2)
BASOPHILS NFR BLD AUTO: 0.7 % (ref 0–1.5)
DEPRECATED RDW RBC AUTO: 43.5 FL (ref 37–54)
EOSINOPHIL # BLD AUTO: 0.23 10*3/MM3 (ref 0–0.4)
EOSINOPHIL NFR BLD AUTO: 2.3 % (ref 0.3–6.2)
ERYTHROCYTE [DISTWIDTH] IN BLOOD BY AUTOMATED COUNT: 14.3 % (ref 12.3–15.4)
HCT VFR BLD AUTO: 40.4 % (ref 34–46.6)
HGB BLD-MCNC: 13.6 G/DL (ref 12–15.9)
IMM GRANULOCYTES # BLD AUTO: 0.04 10*3/MM3 (ref 0–0.05)
IMM GRANULOCYTES NFR BLD AUTO: 0.4 % (ref 0–0.5)
LYMPHOCYTES # BLD AUTO: 2.36 10*3/MM3 (ref 0.7–3.1)
LYMPHOCYTES NFR BLD AUTO: 24 % (ref 19.6–45.3)
MCH RBC QN AUTO: 28 PG (ref 26.6–33)
MCHC RBC AUTO-ENTMCNC: 33.7 G/DL (ref 31.5–35.7)
MCV RBC AUTO: 83.1 FL (ref 79–97)
MONOCYTES # BLD AUTO: 0.72 10*3/MM3 (ref 0.1–0.9)
MONOCYTES NFR BLD AUTO: 7.3 % (ref 5–12)
NEUTROPHILS NFR BLD AUTO: 6.42 10*3/MM3 (ref 1.7–7)
NEUTROPHILS NFR BLD AUTO: 65.3 % (ref 42.7–76)
NRBC BLD AUTO-RTO: 0 /100 WBC (ref 0–0.2)
PLATELET # BLD AUTO: 275 10*3/MM3 (ref 140–450)
PMV BLD AUTO: 9.5 FL (ref 6–12)
RBC # BLD AUTO: 4.86 10*6/MM3 (ref 3.77–5.28)
WBC # BLD AUTO: 9.84 10*3/MM3 (ref 3.4–10.8)

## 2021-04-28 PROCEDURE — 85025 COMPLETE CBC W/AUTO DIFF WBC: CPT

## 2021-04-28 PROCEDURE — U0004 COV-19 TEST NON-CDC HGH THRU: HCPCS

## 2021-04-28 PROCEDURE — C9803 HOPD COVID-19 SPEC COLLECT: HCPCS

## 2021-04-28 PROCEDURE — 36415 COLL VENOUS BLD VENIPUNCTURE: CPT

## 2021-04-28 PROCEDURE — G2212 PROLONG OUTPT/OFFICE VIS: HCPCS | Performed by: INTERNAL MEDICINE

## 2021-04-28 PROCEDURE — 99205 OFFICE O/P NEW HI 60 MIN: CPT | Performed by: INTERNAL MEDICINE

## 2021-04-28 NOTE — TELEPHONE ENCOUNTER
"CSW contacted the patient to follow up on a DQ score of 8, and to assess support/resource needs.      The patient reported feeling overwhelmed by her diagnosis, and an increase in anxiety over the past three weeks..  The patient stated she is not sleeping well because \"my brain goes everywhere.\"  The patient stated she is high strung at baseline, and was somewhat anxious prior to her diagnosis.  She has been \"on and off\" different medications for anxiety/depression and has been taking Xanax .5 mg nightly to assist with sleep.  She will be increasing the dose to 1 mg nightly due to .5 mg dose not being particularly effective.  It appears she is also taking Wellbutrin and Prozac.  The patient reported not knowing what helps and what doesn't help.      The patient is aware she's been referred to the Supportive Oncology clinic for medication evaluation.  She had several questions about this, and CSW was able to provide education about how the process works.  The patient indicated she thinks medication evaluation is a good idea at this point.      The patient's  Jelani Schmidt is also a patient with the [facility] and is monitored by Dr. Hollins for lymphoma.  He is on Social Security disability.  As a result, the patient and her  are now covered by the patient's insurance, which she reports as being not as good.  Thus, she is concerned about future medical bills.       CSW provided education regarding the [facility] financial assistance program, and the patient would like to apply.  CSW will send an application to the patient via post.      CSW provided brief education regarding emotional support available, and we agreed to have a follow up meeting during the patient's next clinic visit to further discuss.  The patient was in the car on her way to  her granddaughter from school.          "

## 2021-04-28 NOTE — PROGRESS NOTES
Subjective   Holli Patel is a 55 y.o. female.  Referred by Dr. 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    3/1/2021-bilateral screening mammogram-microcalcifications seen in the posterior one third of the lateral aspect of the right breast.  Area of focal asymmetry seen in the middle one third of the upper inner quadrant of the left breast.    3/1/2021-DEXA scan-T score of -2.2 on the lumbar spine and T score of -2.3 in the left hip and T score of -1.9 in the right hip.  Findings consistent with osteopenia    3/23/2021-screening lung CT-there is a 10 x 11 mm solid nodule in the left upper lobe.  Enlarged AP window lymph node measuring 14 x 10 mm.  Suggestion of a possible 9 mm left hilar lymph node.  Heavy coronary artery calcification.    3/23/2021-diagnostic mammogram bilateral-cluster of microcalcifications in the middle third lateral aspect of the right breast.  Left breast demonstrates persistence of the area of focal asymmetry in the region.    Ultrasound-left breast ultrasound at 10 o'clock position, 6 cm from the nipple there is a 0.4 cm irregular hypoechoic lesion.  Stereotactic biopsy of the right breast calcifications recommended.  Ultrasound-guided biopsy of the left breast lesion recommended    4/7/2021-right breast ear tactic biopsy and left breast ultrasound-guided biopsy  Pathology  Right breast-invasive ductal carcinoma, grade 2, lymphovascular invasion present, ER +99% strong, NJ +80% moderate, HER-2 negative, Ki-67 12%  Left breast upper inner quadrant 10 o'clock position biopsy, invasive ductal carcinoma, grade 2, ER +99% strong, NJ +40% strong, HER-2 2+ on immunohistochemistry, nonamplified on FISH Ki-67 10%    4/14/2021-PET/CT-FDG avid aortopulmonary window lymph node measures 0.9 cm with an SUV of 7.5.  FDG avid left hilar lymph node  measures 1 cm with an SUV of 17.2.  Irregular soft tissue density in the right breast measuring 3.7 x 3.8 cm is favored to be secondary to the recent breast biopsy.  1.1 cm pulmonary nodule within the left upper lobe measures 9.7 cm.  Sub-6 mm pulmonary nodules are present in the right lower lobe.  No evidence of lymphadenopathy or metastatic disease in the abdomen.  Focal area of FDG uptake within the central canal posterior to T11-T12 displaced demonstrating an SUV of 5.5 thought to be reactive however MRI is recommended for further evaluation.    She was seen by Dr. Molina who initially planned for breast surgery however  due to the PET abnormalities she has been referred to Dr. Kerr who plans to do a wound on 4/30/2021.  Dr. Molina's and Dr. Kerr's notes reviewed.    Patient denies any family history of breast cancer.  Her mother had lymphoma.  Maternal grandmother had colon cancer.  Prior to that screening mammogram she did not have any palpable abnormalities of either breast.    She has been a heavy smoker for about 40 years and smoked 2 packs/day.  Denies any recent weight changes, new bone pains, cough, abdominal pain nausea vomiting constipation or diarrhea.  She does have anxiety and has been on several medications.  She has recently been started on Xanax to help with the mood and also with insomnia.    The following portions of the patient's history were reviewed and updated as appropriate: allergies, current medications, past family history, past medical history, past social history, past surgical history and problem list.    Past Medical History:   Diagnosis Date   • Anxiety    • SHAYY (generalized anxiety disorder)     RELATED HIGH BLOOD PRESSURE   • GERD (gastroesophageal reflux disease)    • High blood pressure     ANXIETY RELATED   • Hyperlipidemia    • Hypothyroidism    • Insomnia    • Lung nodule    • Malignant neoplasm of upper-outer quadrant of right breast in female, estrogen receptor  "positive (CMS/[facility]) 2021   • Migraine    • PONV (postoperative nausea and vomiting)    • Rotator cuff tear     NO ISSUES NOW   • SOB (shortness of breath)         Past Surgical History:   Procedure Laterality Date   • BREAST BIOPSY Bilateral 2021    Right Breast 10 o'clock & Left breast 10 o'clock 6 cm from nipple, [facility]   • WRIST SURGERY Right         Family History   Problem Relation Age of Onset   • Cancer Father         LYMPHATIC   • Alcohol abuse Brother    • Colon cancer Maternal Grandmother    • Malig Hyperthermia Neg Hx         Social History     Socioeconomic History   • Marital status:      Spouse name: Not on file   • Number of children: Not on file   • Years of education: Not on file   • Highest education level: Not on file   Tobacco Use   • Smoking status: Current Every Day Smoker     Packs/day: 1.00     Types: Electronic Cigarette, Cigarettes     Start date: 1981   • Smokeless tobacco: Never Used   Vaping Use   • Vaping Use: Never used   Substance and Sexual Activity   • Alcohol use: Never   • Drug use: Never   • Sexual activity: Yes        OB History        2    Para   2    Term   2            AB        Living           SAB        TAB        Ectopic        Molar        Multiple        Live Births                Age of menarche-10  Age at menopause-42  Oral contraceptive pill use-15 years  No HRT use  She has 2 children  Age at first live childbirth-17    No Known Allergies         Review of Systems   Review of systems as mentioned in the HPI      Objective   Blood pressure 149/89, pulse 91, temperature 97.6 °F (36.4 °C), temperature source Temporal, resp. rate 20, height 160 cm (62.99\"), weight 79.8 kg (175 lb 14.4 oz), SpO2 95 %, not currently breastfeeding.   Physical Exam  Vitals reviewed.   Constitutional:       Appearance: She is obese.   HENT:      Head: Normocephalic.      Right Ear: External ear normal.      Left Ear: External ear normal.      Nose: Nose " normal.      Mouth/Throat:      Mouth: Mucous membranes are moist.      Pharynx: Oropharynx is clear.   Eyes:      Extraocular Movements: Extraocular movements intact.      Conjunctiva/sclera: Conjunctivae normal.      Pupils: Pupils are equal, round, and reactive to light.   Cardiovascular:      Rate and Rhythm: Normal rate and regular rhythm.      Pulses: Normal pulses.      Heart sounds: Normal heart sounds.   Pulmonary:      Effort: Pulmonary effort is normal.      Breath sounds: Normal breath sounds.   Abdominal:      General: Abdomen is flat. Bowel sounds are normal. There is no distension.      Palpations: There is no mass.   Genitourinary:     General: Normal vulva.   Musculoskeletal:         General: Normal range of motion.      Cervical back: Normal range of motion.   Skin:     General: Skin is warm.   Neurological:      General: No focal deficit present.      Mental Status: She is alert and oriented to person, place, and time.   Psychiatric:         Mood and Affect: Mood normal.         Behavior: Behavior normal.         Thought Content: Thought content normal.         Judgment: Judgment normal.       Breast Exam: Right breast with a large postbiopsy hematoma in the upper outer quadrant measuring 7 cm.  Visible ecchymosis.  No other palpable abnormalities left breast with no palpable abnormalities.  No right or left axillary lymphadenopathy    Lab on 04/28/2021   Component Date Value Ref Range Status   • WBC 04/28/2021 9.84  3.40 - 10.80 10*3/mm3 Final   • RBC 04/28/2021 4.86  3.77 - 5.28 10*6/mm3 Final   • Hemoglobin 04/28/2021 13.6  12.0 - 15.9 g/dL Final   • Hematocrit 04/28/2021 40.4  34.0 - 46.6 % Final   • MCV 04/28/2021 83.1  79.0 - 97.0 fL Final   • MCH 04/28/2021 28.0  26.6 - 33.0 pg Final   • MCHC 04/28/2021 33.7  31.5 - 35.7 g/dL Final   • RDW 04/28/2021 14.3  12.3 - 15.4 % Final   • RDW-SD 04/28/2021 43.5  37.0 - 54.0 fl Final   • MPV 04/28/2021 9.5  6.0 - 12.0 fL Final   • Platelets  04/28/2021 275  140 - 450 10*3/mm3 Final   • Neutrophil % 04/28/2021 65.3  42.7 - 76.0 % Final   • Lymphocyte % 04/28/2021 24.0  19.6 - 45.3 % Final   • Monocyte % 04/28/2021 7.3  5.0 - 12.0 % Final   • Eosinophil % 04/28/2021 2.3  0.3 - 6.2 % Final   • Basophil % 04/28/2021 0.7  0.0 - 1.5 % Final   • Immature Grans % 04/28/2021 0.4  0.0 - 0.5 % Final   • Neutrophils, Absolute 04/28/2021 6.42  1.70 - 7.00 10*3/mm3 Final   • Lymphocytes, Absolute 04/28/2021 2.36  0.70 - 3.10 10*3/mm3 Final   • Monocytes, Absolute 04/28/2021 0.72  0.10 - 0.90 10*3/mm3 Final   • Eosinophils, Absolute 04/28/2021 0.23  0.00 - 0.40 10*3/mm3 Final   • Basophils, Absolute 04/28/2021 0.07  0.00 - 0.20 10*3/mm3 Final   • Immature Grans, Absolute 04/28/2021 0.04  0.00 - 0.05 10*3/mm3 Final   • nRBC 04/28/2021 0.0  0.0 - 0.2 /100 WBC Final   Pre-Admission Testing on 04/26/2021   Component Date Value Ref Range Status   • QT Interval 04/26/2021 400  ms Final   • Glucose 04/26/2021 101* 65 - 99 mg/dL Final   • BUN 04/26/2021 17  6 - 20 mg/dL Final   • Creatinine 04/26/2021 0.79  0.57 - 1.00 mg/dL Final   • Sodium 04/26/2021 140  136 - 145 mmol/L Final   • Potassium 04/26/2021 4.4  3.5 - 5.2 mmol/L Final   • Chloride 04/26/2021 103  98 - 107 mmol/L Final   • CO2 04/26/2021 27.2  22.0 - 29.0 mmol/L Final   • Calcium 04/26/2021 9.5  8.6 - 10.5 mg/dL Final   • eGFR Non African Amer 04/26/2021 75  >60 mL/min/1.73 Final   • BUN/Creatinine Ratio 04/26/2021 21.5  7.0 - 25.0 Final   • Anion Gap 04/26/2021 9.8  5.0 - 15.0 mmol/L Final   • Protime 04/26/2021 12.2  11.7 - 14.2 Seconds Final   • INR 04/26/2021 0.92  0.90 - 1.10 Final   • ABO Type 04/26/2021 O   Final   • RH type 04/26/2021 Positive   Final   • Antibody Screen 04/26/2021 Negative   Final   • T&S Expiration Date 04/26/2021 5/10/2021 11:59:00 PM   Final   • WBC 04/26/2021 8.53  3.40 - 10.80 10*3/mm3 Final   • RBC 04/26/2021 4.85  3.77 - 5.28 10*6/mm3 Final   • Hemoglobin 04/26/2021 12.9  12.0 -  15.9 g/dL Final   • Hematocrit 04/26/2021 40.0  34.0 - 46.6 % Final   • MCV 04/26/2021 82.5  79.0 - 97.0 fL Final   • MCH 04/26/2021 26.6  26.6 - 33.0 pg Final   • MCHC 04/26/2021 32.3  31.5 - 35.7 g/dL Final   • RDW 04/26/2021 13.9  12.3 - 15.4 % Final   • RDW-SD 04/26/2021 41.2  37.0 - 54.0 fl Final   • MPV 04/26/2021 9.8  6.0 - 12.0 fL Final   • Platelets 04/26/2021 310  140 - 450 10*3/mm3 Final   • Neutrophil % 04/26/2021 58.3  42.7 - 76.0 % Final   • Lymphocyte % 04/26/2021 27.1  19.6 - 45.3 % Final   • Monocyte % 04/26/2021 7.7  5.0 - 12.0 % Final   • Eosinophil % 04/26/2021 5.3  0.3 - 6.2 % Final   • Basophil % 04/26/2021 1.2  0.0 - 1.5 % Final   • Immature Grans % 04/26/2021 0.4  0.0 - 0.5 % Final   • Neutrophils, Absolute 04/26/2021 4.98  1.70 - 7.00 10*3/mm3 Final   • Lymphocytes, Absolute 04/26/2021 2.31  0.70 - 3.10 10*3/mm3 Final   • Monocytes, Absolute 04/26/2021 0.66  0.10 - 0.90 10*3/mm3 Final   • Eosinophils, Absolute 04/26/2021 0.45* 0.00 - 0.40 10*3/mm3 Final   • Basophils, Absolute 04/26/2021 0.10  0.00 - 0.20 10*3/mm3 Final   • Immature Grans, Absolute 04/26/2021 0.03  0.00 - 0.05 10*3/mm3 Final   • nRBC 04/26/2021 0.0  0.0 - 0.2 /100 WBC Final   Hospital Outpatient Visit on 04/14/2021   Component Date Value Ref Range Status   • Glucose 04/14/2021 110  70 - 130 mg/dL Final   Hospital Outpatient Visit on 04/07/2021   Component Date Value Ref Range Status   • Addendum 04/07/2021    Final                    Value:This result contains rich text formatting which cannot be displayed here.   • Case Report 04/07/2021    Final                    Value:Surgical Pathology Report                         Case: [id number]                                  Authorizing Provider:  Jeffrey Quevedo Jr., MD Collected:           04/07/2021 09:25 AM          Ordering Location:     [facility]  Received:            04/07/2021 11:31 AM                                 Mammography                                                                   Pathologist:           Raphael Neri MD                                                         Specimens:   1) - Breast, Right, Right breast stereotactic biopsy, Dr. MANFRED Quevedo Performed wxam,                  For calcification,Formalin Time @ 0930,Faceclock 10:00, # cores 7, Specimen removed @               0925-                                                                                               2) - Breast, Left, left breast mass 1000 6 cm-specimen removed 11:04, in formalin                   11:04-not for calcifications                                                              • Clinical Information 04/07/2021    Final                    Value:This result contains rich text formatting which cannot be displayed here.   • Final Diagnosis 04/07/2021    Final                    Value:This result contains rich text formatting which cannot be displayed here.   • Synoptic Checklist 04/07/2021    Final                    Value:INVASIVE CARCINOMA OF THE BREAST: Biopsy  (INVASIVE CARCINOMA OF THE BREAST: BIOPSY - All Specimens)                                                        Protocol posted: 4/29/2020                                                        SPECIMEN                               Procedure:    Needle biopsy                                Specimen Laterality:    Right                                                         TUMOR                               Tumor Site:    Upper outer quadrant                                Tumor Site:    Clock position                                :    10 o'clock                              Histologic Type:    Invasive carcinoma of no special type (ductal)                              Glandular (Acinar) / Tubular Differentiation:    Score 3                              Nuclear Pleomorphism:    Score 2                              Mitotic Rate:    Score 1                              Overall Grade:    Grade 2  (scores of 6 or 7)                              Tumor Size:    Greatest dimension of largest invasive focus (Millimeters): 2 mm                             Ductal Carcinoma In Situ (DCIS):    Present                                Architectural Patterns:    Cribriform                                Architectural Patterns:    Solid                                Nuclear Grade:    Grade II (intermediate)                                Necrosis:    Not identified                              Lymphovascular Invasion:    Present                              Microcalcifications:    Present in DCIS                             INVASIVE CARCINOMA OF THE BREAST: Biopsy  (INVASIVE CARCINOMA OF THE BREAST: BIOPSY - All Specimens)                                                        Protocol posted: 4/29/2020                                                        SPECIMEN                               Procedure:    Needle biopsy                                Specimen Laterality:    Left                                                         TUMOR                               Tumor Site:    Upper inner quadrant                                Tumor Site:    Clock position                                :    10 o'clock                              Tumor Site:    Distance from nipple (Centimeters): 6 cm                             Histologic Type:    Invasive carcinoma of no special type (ductal)                              Glandular (Acinar) / Tubular Differentiation:    Score 3                              Nuclear Pleomorphism:    Score 2                              Mitotic Rate:    Score 1                              Overall Grade:    Grade 2 (scores of 6 or 7)                              Tumor Size:    Greatest dimension of largest invasive focus (Millimeters): 4.5 mm                             Ductal Carcinoma In Situ (DCIS):    Not identified                              Lymphovascular Invasion:    Not identified                               Microcalcifications:    Not identified                             Breast Biomarker Reporting Template  (BREAST: BIOMARKER REPORTING TEMPLATE - All Specimens)                                                        Protocol posted: 2/26/2020                                                           Test(s) Performed:                                     Estrogen Receptor (ER) Status:    Positive (greater than 10% of cells demonstrate nuclear positivity)                                    Percentage of Cells with Nuclear Positivity:    99 %                                   Average Intensity of Staining:    Strong                                  Test Type:    Food and Drug Administration (FDA) cleared (test / vendor): Ventena                                  Primary Antibody:    SP1                                  Scoring System:    Aleshia                                    Proportion Score:    5                                    Intensity Score:    3                                    Total Aleshia Score:    8                                Test(s) Performed:                                     Progesterone Receptor (PgR) Status:    Positive                                    Percentage of Cells with Nuclear Positivity:    80 %                                   Average Intensity of Staining:    Moderate                                  Test Type:    Food and Drug Administration (FDA) cleared (test / vendor): Ventena                                  Primary Antibody:    1E2                                  Scoring System:    Aleshia                                    Proportion Score:    5                                    Intensity Score:    2                                    Total Aleshia Score:    7                                Test(s) Performed:                                     HER2 by Immunohistochemistry:    Negative (Score 0)                                  Test Type:    Food  and Drug Administration (FDA) cleared (test / vendor): [employer]                                  Primary Antibody:    4B5                                Test(s) Performed:    Ki-67                                  Percentage of Cells with Nuclear Positivity:    12 %                                 Primary Antibody:    30-9                                Cold Ischemia and Fixation Times:    Meet requirements specified in latest version of the ASCO / CAP Guidelines                                Cold Ischemia Time (minutes):    5 min                               Fixation Time (hours):    11.5 hours                               Testing Performed on Block Number(s):    1A                                                         METHODS                               Fixative:    Formalin                                Image Analysis:    Not performed                                                         Comment(s)                             Comment(s):    This is the tumor on the right part 1                             Breast Biomarker Reporting Template  (BREAST: BIOMARKER REPORTING TEMPLATE - All Specimens)                                                        Protocol posted: 2/26/2020                                                           Test(s) Performed:                                     Estrogen Receptor (ER) Status:    Positive (greater than 10% of cells demonstrate nuclear positivity)                                    Percentage of Cells with Nuclear Positivity:    99 %                                   Average Intensity of Staining:    Strong                                  Test Type:    Food and Drug Administration (FDA) cleared (test / vendor): Ventena                                  Primary Antibody:    SP1                                  Scoring System:    Aleshia                                    Proportion Score:    5                                    Intensity Score:    3                                     Total Aleshia Score:    8                                Test(s) Performed:                                     Progesterone Receptor (PgR) Status:    Positive                                    Percentage of Cells with Nuclear Positivity:    40 %                                   Average Intensity of Staining:    Strong                                  Test Type:    Food and Drug Administration (FDA) cleared (test / vendor): Lumoid                                  Primary Antibody:    1E2                                  Scoring System:    Aleshia                                    Proportion Score:    4                                    Intensity Score:    3                                    Total Aleshia Score:    7                                Test(s) Performed:                                     HER2 by Immunohistochemistry:    Equivocal (Score 2+)                                    Percentage of Cells with Uniform Intense Complete Membrane Stainin %                                 Test Type:    Food and Drug Administration (FDA) cleared (test / vendor): Lumoid                                  Primary Antibody:    4B5                                Test(s) Performed:    Ki-67                                  Percentage of Cells with Nuclear Positivity:    10 %                                 Primary Antibody:    30-9                                Cold Ischemia and Fixation Times:    Meet requirements specified in latest version of the ASCO / CAP Guidelines                                Cold Ischemia Time (minutes):    0 min                               Fixation Time (hours):    10 hours                               Testing Performed on Block Number(s):    2A                                                         METHODS                               Fixative:    Formalin                                Image Analysis:    Not performed                                                          Comment(s)                             Comment(s):    This is the tumor on the left part 2      • Comment 04/07/2021    Final                    Value:This result contains rich text formatting which cannot be displayed here.   • Gross Description 04/07/2021    Final                    Value:This result contains rich text formatting which cannot be displayed here.   • Special Stains 04/07/2021    Final                    Value:This result contains rich text formatting which cannot be displayed here.   • Microscopic Description 04/07/2021    Final                    Value:This result contains rich text formatting which cannot be displayed here.        Mammo Stereotactic Breast Biopsy Initial With & Without Device    Result Date: 4/9/2021  Technically successful stereotactic-guided mammotome vacuum-assisted right breast biopsy with placement of a metallic clip and ultrasound-guided mammotome vacuum-assisted left breast biopsy with placement of a metallic clip. The pathology results from both sites have returned as malignant. Surgical consultation is recommended.  BI-RADS CATEGORY 6: Known biopsy-proven malignancy.  This report was finalized on 4/9/2021 4:06 PM by Dr. Jeffrey Quevedo M.D.      NM Pet Skull Base To Mid Thigh    Result Date: 4/14/2021  1.  Intensely FDG avid left upper lobe pulmonary nodule with hypermetabolic left hilar and mediastinal adenopathy likely representing malignancy and metastatic disease. Given patient history, these findings represent either metastatic breast cancer versus primary pulmonary malignancy with metastatic disease. Left hilar nodes appear amenable to endobronchial biopsy. 2.  Non-FDG avid soft tissue tissue density lesion within the right breast favored to be postsurgical changes related to recent biopsy. 3.  Focal area of FDG uptake within the central canal posterior to T11-12 disc space. While findings may be reactive, they are indeterminate and further evaluation  with thoracic spine MRI with and without contrast is recommended. 4.  Subtle area of asymmetric uptake within the right acetabulum without an underlying lesion seen on noncontrast CT. Findings are favored to be artifactual. However, continued close attention on follow-up is recommended to exclude the small possibility of metastatic disease. 5.  Sub-6 mm pulmonary nodules within the right lower lobe which are indeterminate. Continued attention on follow-up is recommended. 6.  Other findings  This report was finalized on 4/14/2021 1:38 PM by Dr. Serjio Lorenz M.D.      US Guided Breast Biopsy With & Without Device initial    Result Date: 4/9/2021  Technically successful stereotactic-guided mammotome vacuum-assisted right breast biopsy with placement of a metallic clip and ultrasound-guided mammotome vacuum-assisted left breast biopsy with placement of a metallic clip. The pathology results from both sites have returned as malignant. Surgical consultation is recommended.  BI-RADS CATEGORY 6: Known biopsy-proven malignancy.  This report was finalized on 4/9/2021 4:06 PM by Dr. Jeffrey Quevedo M.D.       4/28/2021  CBC-WBC 9.84, hemoglobin 13.4, platelets 275    I have reviewed and interpreted all the images independently and details of them have been summarized in the HPI    Assessment/Plan        *Bilateral breast cancer  · Invasive ductal carcinoma in both right and left breast and lesions measure under 1 cm.  No palpable lymphadenopathy although unsure if this has been evaluated by an axillary ultrasound.  No abnormal axillary lymphadenopathy noted on PET.  · Most likely clinical T1b N0 MX, both cancers were noted to be grade 2, ER/WY positive and HER-2 negative and Ki-67 less than 15%  Discussed at length the details of imaging and pathology report.Discussed the origin of breast cancer from the ducts and the lobules and the histological type of breast cancer based on site of origin. Discussed the tumor size, lymph  node status and stage of the cancer. Explained the presence of DCIS. Discussed the receptor status including ER, IN and her-2 melinda and their significance in determining the biology and treatment. Also discussed the importance of grade and ki-67.   Explained to her that the stage of the breast cancer would depend upon the biopsy results from the lung.  Given that she has been a heavy smoker and how small the tumors are in the breast and negative axillary exam I do feel like we are dealing with 2 separate primaries.  If that is the case then probably the best approach would be to proceed with a lumpectomy of both breasts if possible and start definitive treatment for lung cancer.  I do notice that she has a huge hematoma in the right breast which may make a lumpectomy difficult on the right side.  If that is the case then we could pursue neoadjuvant endocrine therapy along with treatment for the lung cancer followed by definitive surgery  She is scheduled for a VATS on 4/30/2021  I will plan to see her back in 10 days to discuss the pathology and final treatment recommendations  After the biopsy I will discuss with Dr. Tracy Kerr to formulate a definite plan.    *Bilateral breast cancer-no family history of breast cancer but given synchronous breast cancer genetic testing has been sent.  Results pending    *Anxiety-she is on several different medications currently.  Referral has been made to supportive oncology clinic today.  She is requesting an increase in the dose of the Xanax.  Okay to increase the dose to 1 mg nightly to help with sleep.  Further management of these medications will be deferred to supportive oncology once she has established care with them.    *Tobacco use-she is working on smoking cessation.  Plans to quit completely.  Congratulated her on her efforts.    *Hypertension-continue lisinopril    *Follow-up-10 days    90 minutes has been spent on the encounter including reviewing all of the  images, face-to-face time, documentation of the same day

## 2021-04-29 LAB — SARS-COV-2 RNA RESP QL NAA+PROBE: NOT DETECTED

## 2021-04-30 ENCOUNTER — ANESTHESIA EVENT (OUTPATIENT)
Dept: PERIOP | Facility: HOSPITAL | Age: 56
End: 2021-04-30

## 2021-04-30 ENCOUNTER — APPOINTMENT (OUTPATIENT)
Dept: GENERAL RADIOLOGY | Facility: HOSPITAL | Age: 56
End: 2021-04-30

## 2021-04-30 ENCOUNTER — ANESTHESIA (OUTPATIENT)
Dept: PERIOP | Facility: HOSPITAL | Age: 56
End: 2021-04-30

## 2021-04-30 ENCOUNTER — HOSPITAL ENCOUNTER (INPATIENT)
Facility: HOSPITAL | Age: 56
LOS: 1 days | Discharge: HOME | End: 2021-05-01
Attending: THORACIC SURGERY (CARDIOTHORACIC VASCULAR SURGERY) | Admitting: THORACIC SURGERY (CARDIOTHORACIC VASCULAR SURGERY)

## 2021-04-30 DIAGNOSIS — R59.0 MEDIASTINAL ADENOPATHY: ICD-10-CM

## 2021-04-30 DIAGNOSIS — R91.1 LUNG NODULE: Primary | ICD-10-CM

## 2021-04-30 DIAGNOSIS — R91.1 NODULE OF UPPER LOBE OF LEFT LUNG: ICD-10-CM

## 2021-04-30 PROCEDURE — C9290 INJ, BUPIVACAINE LIPOSOME: HCPCS | Performed by: ANESTHESIOLOGY

## 2021-04-30 PROCEDURE — 25010000002 MAGNESIUM SULFATE PER 500 MG OF MAGNESIUM: Performed by: NURSE ANESTHETIST, CERTIFIED REGISTERED

## 2021-04-30 PROCEDURE — C9290 INJ, BUPIVACAINE LIPOSOME: HCPCS | Performed by: THORACIC SURGERY (CARDIOTHORACIC VASCULAR SURGERY)

## 2021-04-30 PROCEDURE — 32601 THORACOSCOPY DIAGNOSTIC: CPT | Performed by: THORACIC SURGERY (CARDIOTHORACIC VASCULAR SURGERY)

## 2021-04-30 PROCEDURE — 94799 UNLISTED PULMONARY SVC/PX: CPT

## 2021-04-30 PROCEDURE — 25010000002 DEXAMETHASONE PER 1 MG: Performed by: NURSE ANESTHETIST, CERTIFIED REGISTERED

## 2021-04-30 PROCEDURE — 25010000003 CEFAZOLIN IN DEXTROSE 2-4 GM/100ML-% SOLUTION: Performed by: THORACIC SURGERY (CARDIOTHORACIC VASCULAR SURGERY)

## 2021-04-30 PROCEDURE — 25010000002 ONDANSETRON PER 1 MG: Performed by: NURSE ANESTHETIST, CERTIFIED REGISTERED

## 2021-04-30 PROCEDURE — 25010000002 MIDAZOLAM PER 1 MG: Performed by: ANESTHESIOLOGY

## 2021-04-30 PROCEDURE — 71045 X-RAY EXAM CHEST 1 VIEW: CPT

## 2021-04-30 PROCEDURE — 88331 PATH CONSLTJ SURG 1 BLK 1SPC: CPT | Performed by: THORACIC SURGERY (CARDIOTHORACIC VASCULAR SURGERY)

## 2021-04-30 PROCEDURE — 25010000003 BUPIVACAINE LIPOSOME 1.3 % SUSPENSION: Performed by: ANESTHESIOLOGY

## 2021-04-30 PROCEDURE — 88360 TUMOR IMMUNOHISTOCHEM/MANUAL: CPT | Performed by: THORACIC SURGERY (CARDIOTHORACIC VASCULAR SURGERY)

## 2021-04-30 PROCEDURE — 25010000003 BUPIVACAINE LIPOSOME 1.3 % SUSPENSION 20 ML VIAL: Performed by: THORACIC SURGERY (CARDIOTHORACIC VASCULAR SURGERY)

## 2021-04-30 PROCEDURE — 8E0W8CZ ROBOTIC ASSISTED PROCEDURE OF TRUNK REGION, VIA NATURAL OR ARTIFICIAL OPENING ENDOSCOPIC: ICD-10-PCS | Performed by: THORACIC SURGERY (CARDIOTHORACIC VASCULAR SURGERY)

## 2021-04-30 PROCEDURE — 07B74ZX EXCISION OF THORAX LYMPHATIC, PERCUTANEOUS ENDOSCOPIC APPROACH, DIAGNOSTIC: ICD-10-PCS | Performed by: THORACIC SURGERY (CARDIOTHORACIC VASCULAR SURGERY)

## 2021-04-30 PROCEDURE — 25010000002 FENTANYL CITRATE (PF) 100 MCG/2ML SOLUTION: Performed by: ANESTHESIOLOGY

## 2021-04-30 PROCEDURE — 88305 TISSUE EXAM BY PATHOLOGIST: CPT | Performed by: THORACIC SURGERY (CARDIOTHORACIC VASCULAR SURGERY)

## 2021-04-30 PROCEDURE — 76942 ECHO GUIDE FOR BIOPSY: CPT | Performed by: THORACIC SURGERY (CARDIOTHORACIC VASCULAR SURGERY)

## 2021-04-30 PROCEDURE — 32674 THORACOSCOPY LYMPH NODE EXC: CPT | Performed by: THORACIC SURGERY (CARDIOTHORACIC VASCULAR SURGERY)

## 2021-04-30 PROCEDURE — 88342 IMHCHEM/IMCYTCHM 1ST ANTB: CPT | Performed by: THORACIC SURGERY (CARDIOTHORACIC VASCULAR SURGERY)

## 2021-04-30 PROCEDURE — 25010000002 PHENYLEPHRINE PER 1 ML: Performed by: NURSE ANESTHETIST, CERTIFIED REGISTERED

## 2021-04-30 PROCEDURE — 25010000002 HYDROMORPHONE PER 4 MG: Performed by: NURSE ANESTHETIST, CERTIFIED REGISTERED

## 2021-04-30 PROCEDURE — 25010000002 PROPOFOL 10 MG/ML EMULSION: Performed by: NURSE ANESTHETIST, CERTIFIED REGISTERED

## 2021-04-30 PROCEDURE — 0BJ08ZZ INSPECTION OF TRACHEOBRONCHIAL TREE, VIA NATURAL OR ARTIFICIAL OPENING ENDOSCOPIC: ICD-10-PCS | Performed by: THORACIC SURGERY (CARDIOTHORACIC VASCULAR SURGERY)

## 2021-04-30 PROCEDURE — 88341 IMHCHEM/IMCYTCHM EA ADD ANTB: CPT | Performed by: THORACIC SURGERY (CARDIOTHORACIC VASCULAR SURGERY)

## 2021-04-30 PROCEDURE — 25010000002 FENTANYL CITRATE (PF) 100 MCG/2ML SOLUTION: Performed by: NURSE ANESTHETIST, CERTIFIED REGISTERED

## 2021-04-30 RX ORDER — PROPRANOLOL HCL 60 MG
60 CAPSULE, EXTENDED RELEASE 24HR ORAL NIGHTLY
Status: DISCONTINUED | OUTPATIENT
Start: 2021-04-30 | End: 2021-05-01 | Stop reason: HOSPADM

## 2021-04-30 RX ORDER — FAMOTIDINE 10 MG/ML
20 INJECTION, SOLUTION INTRAVENOUS ONCE
Status: COMPLETED | OUTPATIENT
Start: 2021-04-30 | End: 2021-04-30

## 2021-04-30 RX ORDER — BISACODYL 10 MG
10 SUPPOSITORY, RECTAL RECTAL DAILY PRN
Status: DISCONTINUED | OUTPATIENT
Start: 2021-04-30 | End: 2021-05-01 | Stop reason: HOSPADM

## 2021-04-30 RX ORDER — SUMATRIPTAN 50 MG/1
50 TABLET, FILM COATED ORAL ONCE
Status: COMPLETED | OUTPATIENT
Start: 2021-04-30 | End: 2021-04-30

## 2021-04-30 RX ORDER — ALPRAZOLAM 0.5 MG/1
0.5 TABLET ORAL NIGHTLY PRN
Status: DISCONTINUED | OUTPATIENT
Start: 2021-04-30 | End: 2021-05-01 | Stop reason: HOSPADM

## 2021-04-30 RX ORDER — FENTANYL CITRATE 50 UG/ML
INJECTION, SOLUTION INTRAMUSCULAR; INTRAVENOUS AS NEEDED
Status: DISCONTINUED | OUTPATIENT
Start: 2021-04-30 | End: 2021-04-30 | Stop reason: SURG

## 2021-04-30 RX ORDER — CELECOXIB 100 MG/1
100 CAPSULE ORAL EVERY 12 HOURS SCHEDULED
Status: DISCONTINUED | OUTPATIENT
Start: 2021-04-30 | End: 2021-05-01 | Stop reason: HOSPADM

## 2021-04-30 RX ORDER — NITROGLYCERIN 0.4 MG/1
0.4 TABLET SUBLINGUAL
Status: DISCONTINUED | OUTPATIENT
Start: 2021-04-30 | End: 2021-05-01 | Stop reason: HOSPADM

## 2021-04-30 RX ORDER — LISINOPRIL 20 MG/1
20 TABLET ORAL DAILY
Status: DISCONTINUED | OUTPATIENT
Start: 2021-05-01 | End: 2021-05-01 | Stop reason: HOSPADM

## 2021-04-30 RX ORDER — ONDANSETRON 2 MG/ML
4 INJECTION INTRAMUSCULAR; INTRAVENOUS EVERY 6 HOURS PRN
Status: DISCONTINUED | OUTPATIENT
Start: 2021-04-30 | End: 2021-05-01 | Stop reason: HOSPADM

## 2021-04-30 RX ORDER — HYDROMORPHONE HYDROCHLORIDE 1 MG/ML
0.5 INJECTION, SOLUTION INTRAMUSCULAR; INTRAVENOUS; SUBCUTANEOUS
Status: DISCONTINUED | OUTPATIENT
Start: 2021-04-30 | End: 2021-05-01 | Stop reason: HOSPADM

## 2021-04-30 RX ORDER — DIPHENHYDRAMINE HCL 25 MG
25 CAPSULE ORAL EVERY 4 HOURS PRN
Status: DISCONTINUED | OUTPATIENT
Start: 2021-04-30 | End: 2021-04-30 | Stop reason: HOSPADM

## 2021-04-30 RX ORDER — BUPIVACAINE HYDROCHLORIDE 2.5 MG/ML
INJECTION, SOLUTION EPIDURAL; INFILTRATION; INTRACAUDAL
Status: COMPLETED | OUTPATIENT
Start: 2021-04-30 | End: 2021-04-30

## 2021-04-30 RX ORDER — GABAPENTIN 300 MG/1
300 CAPSULE ORAL EVERY 8 HOURS SCHEDULED
Status: DISCONTINUED | OUTPATIENT
Start: 2021-04-30 | End: 2021-05-01 | Stop reason: HOSPADM

## 2021-04-30 RX ORDER — FENTANYL CITRATE 50 UG/ML
50 INJECTION, SOLUTION INTRAMUSCULAR; INTRAVENOUS
Status: DISCONTINUED | OUTPATIENT
Start: 2021-04-30 | End: 2021-04-30 | Stop reason: HOSPADM

## 2021-04-30 RX ORDER — OXYCODONE HYDROCHLORIDE 5 MG/1
5 TABLET ORAL EVERY 4 HOURS PRN
Status: DISCONTINUED | OUTPATIENT
Start: 2021-04-30 | End: 2021-05-01 | Stop reason: HOSPADM

## 2021-04-30 RX ORDER — DOCUSATE SODIUM 100 MG/1
100 CAPSULE, LIQUID FILLED ORAL DAILY
Status: DISCONTINUED | OUTPATIENT
Start: 2021-04-30 | End: 2021-05-01 | Stop reason: HOSPADM

## 2021-04-30 RX ORDER — AMOXICILLIN 250 MG
2 CAPSULE ORAL NIGHTLY
Status: DISCONTINUED | OUTPATIENT
Start: 2021-04-30 | End: 2021-05-01 | Stop reason: HOSPADM

## 2021-04-30 RX ORDER — MIDAZOLAM HYDROCHLORIDE 1 MG/ML
2 INJECTION INTRAMUSCULAR; INTRAVENOUS
Status: COMPLETED | OUTPATIENT
Start: 2021-04-30 | End: 2021-04-30

## 2021-04-30 RX ORDER — ONDANSETRON 4 MG/1
4 TABLET, FILM COATED ORAL EVERY 6 HOURS PRN
Status: DISCONTINUED | OUTPATIENT
Start: 2021-04-30 | End: 2021-05-01 | Stop reason: HOSPADM

## 2021-04-30 RX ORDER — SODIUM CHLORIDE 0.9 % (FLUSH) 0.9 %
3 SYRINGE (ML) INJECTION EVERY 12 HOURS SCHEDULED
Status: DISCONTINUED | OUTPATIENT
Start: 2021-04-30 | End: 2021-04-30 | Stop reason: HOSPADM

## 2021-04-30 RX ORDER — LEVOTHYROXINE SODIUM 0.03 MG/1
25 TABLET ORAL
Status: DISCONTINUED | OUTPATIENT
Start: 2021-05-01 | End: 2021-05-01 | Stop reason: HOSPADM

## 2021-04-30 RX ORDER — LIDOCAINE HYDROCHLORIDE 20 MG/ML
INJECTION, SOLUTION INFILTRATION; PERINEURAL AS NEEDED
Status: DISCONTINUED | OUTPATIENT
Start: 2021-04-30 | End: 2021-04-30 | Stop reason: SURG

## 2021-04-30 RX ORDER — HYDROMORPHONE HYDROCHLORIDE 1 MG/ML
0.25 INJECTION, SOLUTION INTRAMUSCULAR; INTRAVENOUS; SUBCUTANEOUS
Status: DISCONTINUED | OUTPATIENT
Start: 2021-04-30 | End: 2021-04-30 | Stop reason: HOSPADM

## 2021-04-30 RX ORDER — BUPROPION HYDROCHLORIDE 150 MG/1
150 TABLET ORAL DAILY
Status: DISCONTINUED | OUTPATIENT
Start: 2021-04-30 | End: 2021-05-01 | Stop reason: HOSPADM

## 2021-04-30 RX ORDER — SODIUM CHLORIDE 0.9 % (FLUSH) 0.9 %
3-10 SYRINGE (ML) INJECTION AS NEEDED
Status: DISCONTINUED | OUTPATIENT
Start: 2021-04-30 | End: 2021-04-30 | Stop reason: HOSPADM

## 2021-04-30 RX ORDER — DEXAMETHASONE SODIUM PHOSPHATE 10 MG/ML
INJECTION INTRAMUSCULAR; INTRAVENOUS AS NEEDED
Status: DISCONTINUED | OUTPATIENT
Start: 2021-04-30 | End: 2021-04-30 | Stop reason: SURG

## 2021-04-30 RX ORDER — FLUOXETINE HYDROCHLORIDE 20 MG/1
40 CAPSULE ORAL DAILY
Status: DISCONTINUED | OUTPATIENT
Start: 2021-04-30 | End: 2021-05-01 | Stop reason: HOSPADM

## 2021-04-30 RX ORDER — CELECOXIB 200 MG/1
200 CAPSULE ORAL ONCE
Status: DISCONTINUED | OUTPATIENT
Start: 2021-04-30 | End: 2021-04-30 | Stop reason: HOSPADM

## 2021-04-30 RX ORDER — METOCLOPRAMIDE HYDROCHLORIDE 5 MG/ML
10 INJECTION INTRAMUSCULAR; INTRAVENOUS ONCE AS NEEDED
Status: DISCONTINUED | OUTPATIENT
Start: 2021-04-30 | End: 2021-04-30 | Stop reason: HOSPADM

## 2021-04-30 RX ORDER — ONDANSETRON 2 MG/ML
INJECTION INTRAMUSCULAR; INTRAVENOUS AS NEEDED
Status: DISCONTINUED | OUTPATIENT
Start: 2021-04-30 | End: 2021-04-30 | Stop reason: SURG

## 2021-04-30 RX ORDER — ACETAMINOPHEN 500 MG
1000 TABLET ORAL ONCE
Status: COMPLETED | OUTPATIENT
Start: 2021-04-30 | End: 2021-04-30

## 2021-04-30 RX ORDER — NALOXONE HCL 0.4 MG/ML
0.4 VIAL (ML) INJECTION
Status: DISCONTINUED | OUTPATIENT
Start: 2021-04-30 | End: 2021-04-30 | Stop reason: HOSPADM

## 2021-04-30 RX ORDER — SODIUM CHLORIDE 9 MG/ML
100 INJECTION, SOLUTION INTRAVENOUS CONTINUOUS
Status: DISCONTINUED | OUTPATIENT
Start: 2021-04-30 | End: 2021-05-01 | Stop reason: HOSPADM

## 2021-04-30 RX ORDER — SODIUM CHLORIDE, SODIUM LACTATE, POTASSIUM CHLORIDE, CALCIUM CHLORIDE 600; 310; 30; 20 MG/100ML; MG/100ML; MG/100ML; MG/100ML
9 INJECTION, SOLUTION INTRAVENOUS CONTINUOUS
Status: DISCONTINUED | OUTPATIENT
Start: 2021-04-30 | End: 2021-05-01 | Stop reason: HOSPADM

## 2021-04-30 RX ORDER — ATORVASTATIN CALCIUM 20 MG/1
10 TABLET, FILM COATED ORAL NIGHTLY
Status: DISCONTINUED | OUTPATIENT
Start: 2021-04-30 | End: 2021-05-01 | Stop reason: HOSPADM

## 2021-04-30 RX ORDER — ACETAMINOPHEN 500 MG
1000 TABLET ORAL EVERY 8 HOURS SCHEDULED
Status: DISCONTINUED | OUTPATIENT
Start: 2021-04-30 | End: 2021-05-01 | Stop reason: HOSPADM

## 2021-04-30 RX ORDER — DIPHENHYDRAMINE HYDROCHLORIDE 50 MG/ML
25 INJECTION INTRAMUSCULAR; INTRAVENOUS EVERY 4 HOURS PRN
Status: DISCONTINUED | OUTPATIENT
Start: 2021-04-30 | End: 2021-04-30 | Stop reason: HOSPADM

## 2021-04-30 RX ORDER — MIDAZOLAM HYDROCHLORIDE 1 MG/ML
INJECTION INTRAMUSCULAR; INTRAVENOUS
Status: COMPLETED | OUTPATIENT
Start: 2021-04-30 | End: 2021-04-30

## 2021-04-30 RX ORDER — POLYETHYLENE GLYCOL 3350 17 G/17G
17 POWDER, FOR SOLUTION ORAL DAILY
Status: DISCONTINUED | OUTPATIENT
Start: 2021-04-30 | End: 2021-05-01 | Stop reason: HOSPADM

## 2021-04-30 RX ORDER — OXYCODONE HYDROCHLORIDE 5 MG/1
5 TABLET ORAL ONCE AS NEEDED
Status: DISCONTINUED | OUTPATIENT
Start: 2021-04-30 | End: 2021-04-30 | Stop reason: HOSPADM

## 2021-04-30 RX ORDER — SODIUM CHLORIDE 9 MG/ML
INJECTION, SOLUTION INTRAVENOUS AS NEEDED
Status: DISCONTINUED | OUTPATIENT
Start: 2021-04-30 | End: 2021-04-30 | Stop reason: HOSPADM

## 2021-04-30 RX ORDER — EPHEDRINE SULFATE 50 MG/ML
INJECTION, SOLUTION INTRAVENOUS AS NEEDED
Status: DISCONTINUED | OUTPATIENT
Start: 2021-04-30 | End: 2021-04-30 | Stop reason: SURG

## 2021-04-30 RX ORDER — CEFAZOLIN SODIUM 2 G/100ML
2 INJECTION, SOLUTION INTRAVENOUS EVERY 8 HOURS
Status: COMPLETED | OUTPATIENT
Start: 2021-04-30 | End: 2021-05-01

## 2021-04-30 RX ORDER — ONDANSETRON 2 MG/ML
4 INJECTION INTRAMUSCULAR; INTRAVENOUS ONCE AS NEEDED
Status: DISCONTINUED | OUTPATIENT
Start: 2021-04-30 | End: 2021-04-30 | Stop reason: HOSPADM

## 2021-04-30 RX ORDER — PROPOFOL 10 MG/ML
VIAL (ML) INTRAVENOUS AS NEEDED
Status: DISCONTINUED | OUTPATIENT
Start: 2021-04-30 | End: 2021-04-30 | Stop reason: SURG

## 2021-04-30 RX ORDER — LIDOCAINE HYDROCHLORIDE 10 MG/ML
0.5 INJECTION, SOLUTION EPIDURAL; INFILTRATION; INTRACAUDAL; PERINEURAL ONCE AS NEEDED
Status: DISCONTINUED | OUTPATIENT
Start: 2021-04-30 | End: 2021-04-30 | Stop reason: HOSPADM

## 2021-04-30 RX ORDER — PROPRANOLOL HCL 60 MG
60 CAPSULE, EXTENDED RELEASE 24HR ORAL ONCE
Status: COMPLETED | OUTPATIENT
Start: 2021-04-30 | End: 2021-04-30

## 2021-04-30 RX ORDER — CEFAZOLIN SODIUM 2 G/100ML
2 INJECTION, SOLUTION INTRAVENOUS ONCE
Status: COMPLETED | OUTPATIENT
Start: 2021-04-30 | End: 2021-04-30

## 2021-04-30 RX ORDER — GABAPENTIN 300 MG/1
600 CAPSULE ORAL ONCE
Status: COMPLETED | OUTPATIENT
Start: 2021-04-30 | End: 2021-04-30

## 2021-04-30 RX ORDER — MAGNESIUM SULFATE HEPTAHYDRATE 500 MG/ML
INJECTION, SOLUTION INTRAMUSCULAR; INTRAVENOUS AS NEEDED
Status: DISCONTINUED | OUTPATIENT
Start: 2021-04-30 | End: 2021-04-30 | Stop reason: SURG

## 2021-04-30 RX ORDER — ROCURONIUM BROMIDE 10 MG/ML
INJECTION, SOLUTION INTRAVENOUS AS NEEDED
Status: DISCONTINUED | OUTPATIENT
Start: 2021-04-30 | End: 2021-04-30 | Stop reason: SURG

## 2021-04-30 RX ORDER — MIDAZOLAM HYDROCHLORIDE 1 MG/ML
1 INJECTION INTRAMUSCULAR; INTRAVENOUS
Status: COMPLETED | OUTPATIENT
Start: 2021-04-30 | End: 2021-04-30

## 2021-04-30 RX ORDER — KETAMINE HYDROCHLORIDE 10 MG/ML
INJECTION INTRAMUSCULAR; INTRAVENOUS AS NEEDED
Status: DISCONTINUED | OUTPATIENT
Start: 2021-04-30 | End: 2021-04-30 | Stop reason: SURG

## 2021-04-30 RX ADMIN — HYDROMORPHONE HYDROCHLORIDE 0.25 MG: 1 INJECTION, SOLUTION INTRAMUSCULAR; INTRAVENOUS; SUBCUTANEOUS at 17:01

## 2021-04-30 RX ADMIN — BUPROPION HYDROCHLORIDE 150 MG: 150 TABLET, FILM COATED, EXTENDED RELEASE ORAL at 21:54

## 2021-04-30 RX ADMIN — HYDROMORPHONE HYDROCHLORIDE 0.25 MG: 1 INJECTION, SOLUTION INTRAMUSCULAR; INTRAVENOUS; SUBCUTANEOUS at 17:31

## 2021-04-30 RX ADMIN — ROCURONIUM BROMIDE 40 MG: 50 INJECTION INTRAVENOUS at 10:04

## 2021-04-30 RX ADMIN — LIDOCAINE HYDROCHLORIDE 80 MG: 20 INJECTION, SOLUTION INFILTRATION; PERINEURAL at 10:04

## 2021-04-30 RX ADMIN — ALPRAZOLAM 0.5 MG: 0.5 TABLET ORAL at 21:57

## 2021-04-30 RX ADMIN — PROPOFOL 200 MG: 10 INJECTION, EMULSION INTRAVENOUS at 10:04

## 2021-04-30 RX ADMIN — PROPRANOLOL HYDROCHLORIDE 60 MG: 60 CAPSULE, EXTENDED RELEASE ORAL at 09:02

## 2021-04-30 RX ADMIN — ACETAMINOPHEN 1000 MG: 500 TABLET, FILM COATED ORAL at 08:40

## 2021-04-30 RX ADMIN — CEFAZOLIN SODIUM 2 G: 2 INJECTION, SOLUTION INTRAVENOUS at 21:51

## 2021-04-30 RX ADMIN — SODIUM CHLORIDE, POTASSIUM CHLORIDE, SODIUM LACTATE AND CALCIUM CHLORIDE 9 ML/HR: 600; 310; 30; 20 INJECTION, SOLUTION INTRAVENOUS at 09:11

## 2021-04-30 RX ADMIN — KETAMINE HYDROCHLORIDE 30 MG: 10 INJECTION INTRAMUSCULAR; INTRAVENOUS at 10:27

## 2021-04-30 RX ADMIN — PHENYLEPHRINE HYDROCHLORIDE 100 MCG: 10 INJECTION INTRAVENOUS at 11:01

## 2021-04-30 RX ADMIN — FENTANYL CITRATE 50 MCG: 50 INJECTION INTRAMUSCULAR; INTRAVENOUS at 11:39

## 2021-04-30 RX ADMIN — MAGNESIUM SULFATE HEPTAHYDRATE 2 G: 500 INJECTION, SOLUTION INTRAMUSCULAR; INTRAVENOUS at 10:13

## 2021-04-30 RX ADMIN — EPHEDRINE SULFATE 10 MG: 50 INJECTION INTRAVENOUS at 11:27

## 2021-04-30 RX ADMIN — DEXAMETHASONE SODIUM PHOSPHATE 8 MG: 10 INJECTION INTRAMUSCULAR; INTRAVENOUS at 10:22

## 2021-04-30 RX ADMIN — PHENYLEPHRINE HYDROCHLORIDE 200 MCG: 10 INJECTION INTRAVENOUS at 10:46

## 2021-04-30 RX ADMIN — ACETAMINOPHEN 1000 MG: 500 TABLET, FILM COATED ORAL at 21:53

## 2021-04-30 RX ADMIN — SODIUM CHLORIDE 100 ML/HR: 9 INJECTION, SOLUTION INTRAVENOUS at 14:49

## 2021-04-30 RX ADMIN — BUPIVACAINE 10 ML: 13.3 INJECTION, SUSPENSION, LIPOSOMAL INFILTRATION at 09:17

## 2021-04-30 RX ADMIN — CEFAZOLIN SODIUM 2 G: 2 INJECTION, SOLUTION INTRAVENOUS at 09:53

## 2021-04-30 RX ADMIN — FENTANYL CITRATE 100 MCG: 50 INJECTION, SOLUTION INTRAMUSCULAR; INTRAVENOUS at 09:19

## 2021-04-30 RX ADMIN — PHENYLEPHRINE HYDROCHLORIDE 100 MCG: 10 INJECTION INTRAVENOUS at 10:42

## 2021-04-30 RX ADMIN — SUGAMMADEX 200 MG: 100 INJECTION, SOLUTION INTRAVENOUS at 11:33

## 2021-04-30 RX ADMIN — MIDAZOLAM 1 MG: 1 INJECTION INTRAMUSCULAR; INTRAVENOUS at 09:32

## 2021-04-30 RX ADMIN — MIDAZOLAM 2 MG: 1 INJECTION INTRAMUSCULAR; INTRAVENOUS at 09:18

## 2021-04-30 RX ADMIN — GABAPENTIN 600 MG: 300 CAPSULE ORAL at 08:41

## 2021-04-30 RX ADMIN — FAMOTIDINE 20 MG: 10 INJECTION INTRAVENOUS at 09:10

## 2021-04-30 RX ADMIN — KETAMINE HYDROCHLORIDE 20 MG: 10 INJECTION INTRAMUSCULAR; INTRAVENOUS at 11:31

## 2021-04-30 RX ADMIN — PROPRANOLOL HYDROCHLORIDE 60 MG: 60 CAPSULE, EXTENDED RELEASE ORAL at 21:54

## 2021-04-30 RX ADMIN — ATORVASTATIN CALCIUM 10 MG: 20 TABLET, FILM COATED ORAL at 21:54

## 2021-04-30 RX ADMIN — FENTANYL CITRATE 50 MCG: 50 INJECTION INTRAMUSCULAR; INTRAVENOUS at 10:01

## 2021-04-30 RX ADMIN — CELECOXIB 100 MG: 100 CAPSULE ORAL at 21:53

## 2021-04-30 RX ADMIN — MIDAZOLAM 1 MG: 1 INJECTION INTRAMUSCULAR; INTRAVENOUS at 09:34

## 2021-04-30 RX ADMIN — PHENYLEPHRINE HYDROCHLORIDE 100 MCG: 10 INJECTION INTRAVENOUS at 11:18

## 2021-04-30 RX ADMIN — BUPIVACAINE HYDROCHLORIDE 15 ML: 2.5 INJECTION, SOLUTION EPIDURAL; INFILTRATION; INTRACAUDAL; PERINEURAL at 09:17

## 2021-04-30 RX ADMIN — PHENYLEPHRINE HYDROCHLORIDE 100 MCG: 10 INJECTION INTRAVENOUS at 10:09

## 2021-04-30 RX ADMIN — SUMATRIPTAN SUCCINATE 50 MG: 50 TABLET ORAL at 21:54

## 2021-04-30 RX ADMIN — OXYCODONE 5 MG: 5 TABLET ORAL at 22:34

## 2021-04-30 RX ADMIN — GABAPENTIN 300 MG: 300 CAPSULE ORAL at 18:25

## 2021-04-30 RX ADMIN — OXYCODONE 5 MG: 5 TABLET ORAL at 18:25

## 2021-04-30 RX ADMIN — GABAPENTIN 300 MG: 300 CAPSULE ORAL at 21:53

## 2021-04-30 RX ADMIN — ONDANSETRON 4 MG: 2 INJECTION INTRAMUSCULAR; INTRAVENOUS at 11:33

## 2021-04-30 NOTE — ANESTHESIA PROCEDURE NOTES
Peripheral Block      Patient reassessed immediately prior to procedure    Patient location during procedure: holding area  Start time: 4/30/2021 9:10 AM  Stop time: 4/30/2021 9:17 AM  Reason for block: at surgeon's request, post-op pain management and secondary anesthetic  Performed by  Anesthesiologist: Killian Lopez MD  Preanesthetic Checklist  Completed: patient identified, IV checked, risks and benefits discussed, surgical consent, monitors and equipment checked, pre-op evaluation and timeout performed  Prep:  Pt Position: sitting  Sterile barriers:gloves, mask and sterile barriers  Prep: ChloraPrep  Patient monitoring: blood pressure monitoring, continuous pulse oximetry and EKG  Procedure  Sedation:yes  Performed under: local infiltration  Guidance:ultrasound guided  ULTRASOUND INTERPRETATION. Using ultrasound guidance a 22 G gauge needle was placed in close proximity to the nerve, at which point, under ultrasound guidance anesthetic was injected in the area of the nerve and spread of the anesthesia was seen on ultrasound in close proximity thereto.  There were no abnormalities seen on ultrasound; a digital image was taken; and the patient tolerated the procedure with no complications. Images:still images obtained, printed/placed on chart    Laterality:left  Block Type:erector spinae block  Injection Technique:single-shot  Needle Type:echogenic  Needle Gauge:22 G  Resistance on Injection: none    Medications Used: bupivacaine liposome (EXPAREL) 1.3 % injection, 10 mL  bupivacaine PF (MARCAINE) 0.25 % injection, 15 mL  Med admintered at 4/30/2021 9:17 AM      Post Assessment  Injection Assessment: negative aspiration for heme, no paresthesia on injection and incremental injection  Patient Tolerance:comfortable throughout block  Complications:no  Additional Notes  Post-Op Diagnosis Codes:     * Nodule of upper lobe of left lung (R91.1)     * Mediastinal adenopathy (R59.0)

## 2021-04-30 NOTE — ANESTHESIA PROCEDURE NOTES
Arterial Line      Patient reassessed immediately prior to procedure    Patient location during procedure: holding area  Start time: 4/30/2021 9:36 AM  Stop Time:4/30/2021 9:39 AM       Line placed for hemodynamic monitoring and ABGs/Labs/ISTAT.  Performed By   Anesthesiologist: Killian Lopez MD  Preanesthetic Checklist  Completed: patient identified, IV checked, site marked, risks and benefits discussed, surgical consent, monitors and equipment checked, pre-op evaluation and timeout performed  Arterial Line Prep   Sterile Tech: gloves, mask, cap and sterile barriers  Prep: ChloraPrep  Patient monitoring: blood pressure monitoring, continuous pulse oximetry and EKG  Arterial Line Procedure   Laterality:right  Location:  radial artery  Catheter size: 20 G   Guidance: ultrasound guided  PROCEDURE NOTE/ULTRASOUND INTERPRETATION.  Using ultrasound guidance the potential vascular sites for insertion of the catheter were visualized to determine the patency of the vessel to be used for vascular access.  After selecting the appropriate site for insertion, the needle was visualized under ultrasound being inserted into the radial artery, followed by ultrasound confirmation of wire and catheter placement. There were no abnormalities seen on ultrasound; an image was taken; and the patient tolerated the procedure with no complications.   Number of attempts: 1  Successful placement: yes  Post Assessment   Dressing Type: occlusive dressing applied, secured with tape and wrist guard applied.   Complications no  Circ/Move/Sens Assessment: normal and unchanged.   Patient Tolerance: patient tolerated the procedure well with no apparent complications

## 2021-04-30 NOTE — ANESTHESIA POSTPROCEDURE EVALUATION
Patient: Holli Patel    Procedure Summary     Date: 04/30/21 Room / Location: [facility] OR [address] [place] [address] MAIN OR    Anesthesia Start: 0956 Anesthesia Stop: 1202    Procedure: BRONCHOSCOPY, LEFT VIDEO ASSITED THORACOSCOPY, ROBOTIC ASSSITED MEDIASTINAL LYMPHADENECTOMY WITH INTERCOSTAL NERVE BLOCKS (Left Chest) Diagnosis:       Nodule of upper lobe of left lung      Mediastinal adenopathy      (Nodule of upper lobe of left lung [R91.1])      (Mediastinal adenopathy [R59.0])    Surgeons: Raphael Kerr III, MD Provider: Killian Lopez MD    Anesthesia Type: general with block ASA Status: 3          Anesthesia Type: general with block    Vitals  Vitals Value Taken Time   BP 99/73 04/30/21 1315   Temp 36.6 °C (97.8 °F) 04/30/21 1200   Pulse 60 04/30/21 1325   Resp 16 04/30/21 1315   SpO2 100 % 04/30/21 1325   Vitals shown include unvalidated device data.        Post Anesthesia Care and Evaluation    Patient location during evaluation: bedside  Patient participation: complete - patient participated  Level of consciousness: awake and alert  Pain management: adequate  Airway patency: patent  Anesthetic complications: No anesthetic complications    Cardiovascular status: acceptable  Respiratory status: acceptable  Hydration status: acceptable    Comments: BP 99/73   Pulse 62   Temp 36.6 °C (97.8 °F) (Oral)   Resp 16   SpO2 100%

## 2021-04-30 NOTE — ANESTHESIA PREPROCEDURE EVALUATION
Anesthesia Evaluation     Patient summary reviewed and Nursing notes reviewed   history of anesthetic complications: PONV               Airway   Mallampati: II  TM distance: >3 FB  Neck ROM: full  no difficulty expected  Dental - normal exam     Pulmonary    (+) a smoker Current Smoked day of surgery, COPD, shortness of breath, rhonchi,   (-) sleep apnea, decreased breath sounds, wheezes  Cardiovascular - normal exam  Exercise tolerance: good (4-7 METS)    Rhythm: regular  Rate: normal    (+) hypertension, hyperlipidemia,   (-) past MI, angina, CHF, orthopnea, PND, CHICAS, PVD      Neuro/Psych  (+) headaches, psychiatric history,     (-) seizures, neuromuscular disease, TIA, CVA, dizziness/light headedness, weakness, numbness  GI/Hepatic/Renal/Endo    (+)  GERD,    (-) liver disease, diabetes    Musculoskeletal     Abdominal  - normal exam   Substance History - negative use  (-) alcohol use, drug use     OB/GYN negative ob/gyn ROS         Other   arthritis,    history of cancer (lung/breast) active                    Anesthesia Plan    ASA 3     general with block   (I discussed with the patient the relevant risks of general anesthesia including, but not limited to, nausea, vomiting, disorientation, post-op delirium, nerve injury, oral/dental injury, awareness, stroke, and death.  I also reviewed any clinically relevant lab and imaging results.)  intravenous induction     Anesthetic plan, all risks, benefits, and alternatives have been provided, discussed and informed consent has been obtained with: patient.    Plan discussed with CRNA.

## 2021-04-30 NOTE — ANESTHESIA PROCEDURE NOTES
Airway  Urgency: elective    Date/Time: 4/30/2021 10:07 AM  Airway not difficult    General Information and Staff    Patient location during procedure: OR  Anesthesiologist: Killian Lopez MD  CRNA: Jb Prince CRNA    Indications and Patient Condition  Indications for airway management: airway protection    Preoxygenated: yes  MILS maintained throughout  Mask difficulty assessment: 1 - vent by mask    Final Airway Details  Final airway type: endotracheal airway      Successful airway: EBT - double lumen left  Cuffed: yes   Successful intubation technique: direct laryngoscopy  Facilitating devices/methods: intubating stylet  Endotracheal tube insertion site: oral  Blade: Lomas  Blade size: 2  EBT DL size (fr): 35  Cormack-Lehane Classification: grade I - full view of glottis  Placement verified by: chest auscultation and capnometry   Measured from: lips  ETT/EBT  to lips (cm): 27  Number of attempts at approach: 1  Assessment: lips, teeth, and gum same as pre-op and atraumatic intubation

## 2021-05-01 ENCOUNTER — READMISSION MANAGEMENT (OUTPATIENT)
Dept: CALL CENTER | Facility: HOSPITAL | Age: 56
End: 2021-05-01

## 2021-05-01 ENCOUNTER — APPOINTMENT (OUTPATIENT)
Dept: GENERAL RADIOLOGY | Facility: HOSPITAL | Age: 56
End: 2021-05-01

## 2021-05-01 VITALS
BODY MASS INDEX: 31.18 KG/M2 | HEART RATE: 67 BPM | TEMPERATURE: 97.7 F | SYSTOLIC BLOOD PRESSURE: 108 MMHG | HEIGHT: 63 IN | OXYGEN SATURATION: 94 % | DIASTOLIC BLOOD PRESSURE: 80 MMHG | RESPIRATION RATE: 16 BRPM | WEIGHT: 176 LBS

## 2021-05-01 LAB
ANION GAP SERPL CALCULATED.3IONS-SCNC: 9.4 MMOL/L (ref 5–15)
BASOPHILS # BLD AUTO: 0.03 10*3/MM3 (ref 0–0.2)
BASOPHILS NFR BLD AUTO: 0.2 % (ref 0–1.5)
BUN SERPL-MCNC: 16 MG/DL (ref 6–20)
BUN/CREAT SERPL: 18.8 (ref 7–25)
CALCIUM SPEC-SCNC: 8.7 MG/DL (ref 8.6–10.5)
CHLORIDE SERPL-SCNC: 106 MMOL/L (ref 98–107)
CO2 SERPL-SCNC: 21.6 MMOL/L (ref 22–29)
CREAT SERPL-MCNC: 0.85 MG/DL (ref 0.57–1)
DEPRECATED RDW RBC AUTO: 44.2 FL (ref 37–54)
EOSINOPHIL # BLD AUTO: 0.01 10*3/MM3 (ref 0–0.4)
EOSINOPHIL NFR BLD AUTO: 0.1 % (ref 0.3–6.2)
ERYTHROCYTE [DISTWIDTH] IN BLOOD BY AUTOMATED COUNT: 13.8 % (ref 12.3–15.4)
GFR SERPL CREATININE-BSD FRML MDRD: 69 ML/MIN/1.73
GLUCOSE SERPL-MCNC: 115 MG/DL (ref 65–99)
HCT VFR BLD AUTO: 38.3 % (ref 34–46.6)
HGB BLD-MCNC: 11.9 G/DL (ref 12–15.9)
IMM GRANULOCYTES # BLD AUTO: 0.05 10*3/MM3 (ref 0–0.05)
IMM GRANULOCYTES NFR BLD AUTO: 0.4 % (ref 0–0.5)
LYMPHOCYTES # BLD AUTO: 1.77 10*3/MM3 (ref 0.7–3.1)
LYMPHOCYTES NFR BLD AUTO: 13.2 % (ref 19.6–45.3)
MCH RBC QN AUTO: 27.1 PG (ref 26.6–33)
MCHC RBC AUTO-ENTMCNC: 31.1 G/DL (ref 31.5–35.7)
MCV RBC AUTO: 87.2 FL (ref 79–97)
MONOCYTES # BLD AUTO: 1.29 10*3/MM3 (ref 0.1–0.9)
MONOCYTES NFR BLD AUTO: 9.6 % (ref 5–12)
NEUTROPHILS NFR BLD AUTO: 10.28 10*3/MM3 (ref 1.7–7)
NEUTROPHILS NFR BLD AUTO: 76.5 % (ref 42.7–76)
NRBC BLD AUTO-RTO: 0 /100 WBC (ref 0–0.2)
PLATELET # BLD AUTO: 300 10*3/MM3 (ref 140–450)
PMV BLD AUTO: 9.8 FL (ref 6–12)
POTASSIUM SERPL-SCNC: 4.5 MMOL/L (ref 3.5–5.2)
RBC # BLD AUTO: 4.39 10*6/MM3 (ref 3.77–5.28)
SODIUM SERPL-SCNC: 137 MMOL/L (ref 136–145)
WBC # BLD AUTO: 13.43 10*3/MM3 (ref 3.4–10.8)

## 2021-05-01 PROCEDURE — 25010000003 CEFAZOLIN IN DEXTROSE 2-4 GM/100ML-% SOLUTION: Performed by: THORACIC SURGERY (CARDIOTHORACIC VASCULAR SURGERY)

## 2021-05-01 PROCEDURE — 99238 HOSP IP/OBS DSCHRG MGMT 30/<: CPT | Performed by: THORACIC SURGERY (CARDIOTHORACIC VASCULAR SURGERY)

## 2021-05-01 PROCEDURE — 80048 BASIC METABOLIC PNL TOTAL CA: CPT | Performed by: THORACIC SURGERY (CARDIOTHORACIC VASCULAR SURGERY)

## 2021-05-01 PROCEDURE — 25010000002 ENOXAPARIN PER 10 MG: Performed by: THORACIC SURGERY (CARDIOTHORACIC VASCULAR SURGERY)

## 2021-05-01 PROCEDURE — 85025 COMPLETE CBC W/AUTO DIFF WBC: CPT | Performed by: THORACIC SURGERY (CARDIOTHORACIC VASCULAR SURGERY)

## 2021-05-01 PROCEDURE — 94799 UNLISTED PULMONARY SVC/PX: CPT

## 2021-05-01 PROCEDURE — 71045 X-RAY EXAM CHEST 1 VIEW: CPT

## 2021-05-01 RX ORDER — GABAPENTIN 300 MG/1
300 CAPSULE ORAL EVERY 8 HOURS SCHEDULED
Qty: 42 CAPSULE | Refills: 0 | Status: SHIPPED | OUTPATIENT
Start: 2021-05-01 | End: 2021-05-13 | Stop reason: SDUPTHER

## 2021-05-01 RX ORDER — OXYCODONE HYDROCHLORIDE 5 MG/1
5 TABLET ORAL EVERY 6 HOURS PRN
Qty: 12 TABLET | Refills: 0 | Status: SHIPPED | OUTPATIENT
Start: 2021-05-01 | End: 2021-05-04

## 2021-05-01 RX ORDER — ACETAMINOPHEN 500 MG
1000 TABLET ORAL EVERY 8 HOURS SCHEDULED
Qty: 86 TABLET | Refills: 0 | Status: SHIPPED | OUTPATIENT
Start: 2021-05-01 | End: 2021-05-16

## 2021-05-01 RX ADMIN — POLYETHYLENE GLYCOL 3350 17 G: 17 POWDER, FOR SOLUTION ORAL at 08:42

## 2021-05-01 RX ADMIN — CEFAZOLIN SODIUM 2 G: 2 INJECTION, SOLUTION INTRAVENOUS at 03:00

## 2021-05-01 RX ADMIN — ACETAMINOPHEN 1000 MG: 500 TABLET, FILM COATED ORAL at 14:08

## 2021-05-01 RX ADMIN — ENOXAPARIN SODIUM 40 MG: 40 INJECTION SUBCUTANEOUS at 08:41

## 2021-05-01 RX ADMIN — GABAPENTIN 300 MG: 300 CAPSULE ORAL at 14:08

## 2021-05-01 RX ADMIN — OXYCODONE 5 MG: 5 TABLET ORAL at 14:08

## 2021-05-01 RX ADMIN — OXYCODONE 5 MG: 5 TABLET ORAL at 05:43

## 2021-05-01 RX ADMIN — LEVOTHYROXINE SODIUM 25 MCG: 0.03 TABLET ORAL at 05:43

## 2021-05-01 RX ADMIN — ACETAMINOPHEN 1000 MG: 500 TABLET, FILM COATED ORAL at 05:43

## 2021-05-01 RX ADMIN — BUPROPION HYDROCHLORIDE 150 MG: 150 TABLET, FILM COATED, EXTENDED RELEASE ORAL at 08:42

## 2021-05-01 RX ADMIN — LISINOPRIL 20 MG: 20 TABLET ORAL at 08:42

## 2021-05-01 RX ADMIN — CELECOXIB 100 MG: 100 CAPSULE ORAL at 08:42

## 2021-05-01 RX ADMIN — DOCUSATE SODIUM 100 MG: 100 CAPSULE, LIQUID FILLED ORAL at 08:42

## 2021-05-01 RX ADMIN — GABAPENTIN 300 MG: 300 CAPSULE ORAL at 05:43

## 2021-05-01 RX ADMIN — FLUOXETINE HYDROCHLORIDE 40 MG: 20 CAPSULE ORAL at 08:42

## 2021-05-01 NOTE — PLAN OF CARE
Problem: Adult Inpatient Plan of Care  Goal: Plan of Care Review  Outcome: Ongoing, Progressing  Goal: Patient-Specific Goal (Individualized)  Outcome: Ongoing, Progressing  Goal: Absence of Hospital-Acquired Illness or Injury  Outcome: Ongoing, Progressing  Intervention: Identify and Manage Fall Risk  Recent Flowsheet Documentation  Taken 4/30/2021 2200 by Keri Patiño RN  Safety Promotion/Fall Prevention:   activity supervised   fall prevention program maintained   nonskid shoes/slippers when out of bed   safety round/check completed  Goal: Optimal Comfort and Wellbeing  Outcome: Ongoing, Progressing  Intervention: Provide Person-Centered Care  Recent Flowsheet Documentation  Taken 4/30/2021 2200 by Keri Patiño RN  Trust Relationship/Rapport:   care explained   choices provided  Goal: Readiness for Transition of Care  Outcome: Ongoing, Progressing     Problem: Fall Injury Risk  Goal: Absence of Fall and Fall-Related Injury  Outcome: Ongoing, Progressing  Intervention: Promote Injury-Free Environment  Recent Flowsheet Documentation  Taken 4/30/2021 2200 by Keri Patiño RN  Safety Promotion/Fall Prevention:   activity supervised   fall prevention program maintained   nonskid shoes/slippers when out of bed   safety round/check completed   Goal Outcome Evaluation:      VSS, c/o pain tx via MAR. Sarah kaiser. CT to WS this AM. Pt ambulated to bathroom with assist. Pulmonary hygiene encouraged. Questioned encouraged.

## 2021-05-01 NOTE — OUTREACH NOTE
Prep Survey      Responses   [facility] facility patient discharged from?  [place]   Is LACE score < 7 ?  Yes   Emergency Room discharge w/ pulse ox?  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  General Surgery   Does the patient have Home health ordered?  No   Is there a DME ordered?  No   Prep survey completed?  Yes          Magalys Davis RN

## 2021-05-01 NOTE — DISCHARGE SUMMARY
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 female.Patient is a 55-year-old  female.  She has been off of work because of the COVID-19 pandemic.  With this she has gained over 20 pounds.  She has been a lifelong smoker.  She began smoking at age 14.  She is smoked 2 packs of cigarettes per day since then.  She noticed some increase in shortness of breath.  She was seen by her primary care physician and a battery of tests were performed.  She had a mammogram which showed bilateral breast cancer.  She had a low-dose screening CT that showed a nodule in the left upper lobe of the lung and mediastinal lymphadenopathy.  She has had biopsies of the breast showing cancer and is undergoing a genetic work-up.  She has had a CT PET scan the shows the lung nodule and lymph nodes to be PET positive.  She has been referred here for further evaluation of these nodules.     She does get short of breath climbing steps.  She has no shortness of breath walking on flat ground.  She reports no cough or hemoptysis.  She has no hoarseness or change in her voice.  She has no pleuritic pain.  There has been no wheezing.  She has a family history of cancer.    Hospital Course  Patient was brought to the hospital on April 30 and under general anesthesia underwent a left robot-assisted mediastinal lymphadenectomy.  Frozen section report from pathology showed lymph nodes to have metastatic breast cancer.  For this reason we elected not to pursue the lung nodule.  Patient tolerated this procedure  well.  She was extubated in the operating room and remained stable in the recovery room.  She was transferred to [facility] for convalescence.  This morning she is sitting up in a chair.  She reports no chest wall pain.  She has no shortness of breath.  There has been only 100 cc of drainage from the chest tube.  She has no air leak with cough.  Chest tube was removed completely and intact.  Incisions are clean dry and without signs of infection.  Laboratory data and chest x-ray are acceptable.  Patient will be discharged home today.  Prescriptions and instructions were given to the patient.  She will return to my office in 2 weeks for follow-up.  She will also follow-up with medical oncology and general surgery.    Procedures Performed  Procedure(s):  BRONCHOSCOPY, LEFT VIDEO ASSITED THORACOSCOPY, ROBOTIC ASSSITED MEDIASTINAL LYMPHADENECTOMY WITH INTERCOSTAL NERVE BLOCKS       Consults:   Consults     No orders found for last 30 day(s).          Pertinent Test Results:     Imaging Results (Last 24 Hours)     Procedure Component Value Units Date/Time    XR Chest 1 View [[id number]] Collected: 05/01/21 0716     Updated: 05/01/21 0716    Narrative:        Patient: TANIYA CARPENTER  Time Out: 07:14  Exam(s): FILM CXR 1 VIEW     EXAM:    XR Chest, 1 View    CLINICAL HISTORY:     Post Op Lung Surgery  Reason for exam: Post Op Lung Surgery.    TECHNIQUE:    Frontal view of the chest.    COMPARISON:    4 30 2021    FINDINGS IMPRESSION:       Left-sided chest tube terminates in the left upper hemithorax.  No   pneumothorax.  Clear lungs.  No pleural effusion.  Normal cardiac contour and   mediastinal silhouette.    Impression:          Electronically signed by Cecil Timmosn M.D. on 05-01-21 at 0714          Lab Results (last 24 hours)     Procedure Component Value Units Date/Time    Basic Metabolic Panel [[id number]]  (Abnormal) Collected: 05/01/21 0635    Specimen: Blood Updated: 05/01/21 0734     Glucose 115 mg/dL      BUN 16  mg/dL      Creatinine 0.85 mg/dL      Sodium 137 mmol/L      Potassium 4.5 mmol/L      Chloride 106 mmol/L      CO2 21.6 mmol/L      Calcium 8.7 mg/dL      eGFR Non African Amer 69 mL/min/1.73      BUN/Creatinine Ratio 18.8     Anion Gap 9.4 mmol/L     Narrative:      GFR Normal >60  Chronic Kidney Disease <60  Kidney Failure <15      CBC & Differential [[id number]]  (Abnormal) Collected: 05/01/21 0635    Specimen: Blood Updated: 05/01/21 0708    Narrative:      The following orders were created for panel order CBC & Differential.  Procedure                               Abnormality         Status                     ---------                               -----------         ------                     CBC Auto Differential[[id number]]        Abnormal            Final result                 Please view results for these tests on the individual orders.    CBC Auto Differential [[id number]]  (Abnormal) Collected: 05/01/21 0635    Specimen: Blood Updated: 05/01/21 0708     WBC 13.43 10*3/mm3      RBC 4.39 10*6/mm3      Hemoglobin 11.9 g/dL      Hematocrit 38.3 %      MCV 87.2 fL      MCH 27.1 pg      MCHC 31.1 g/dL      RDW 13.8 %      RDW-SD 44.2 fl      MPV 9.8 fL      Platelets 300 10*3/mm3      Neutrophil % 76.5 %      Lymphocyte % 13.2 %      Monocyte % 9.6 %      Eosinophil % 0.1 %      Basophil % 0.2 %      Immature Grans % 0.4 %      Neutrophils, Absolute 10.28 10*3/mm3      Lymphocytes, Absolute 1.77 10*3/mm3      Monocytes, Absolute 1.29 10*3/mm3      Eosinophils, Absolute 0.01 10*3/mm3      Basophils, Absolute 0.03 10*3/mm3      Immature Grans, Absolute 0.05 10*3/mm3      nRBC 0.0 /100 WBC             Condition on Discharge: Stable    Vital Signs  Temp:  [97.8 °F (36.6 °C)-98.2 °F (36.8 °C)] 98.1 °F (36.7 °C)  Heart Rate:  [60-84] 84  Resp:  [14-18] 16  BP: ()/(70-86) 108/80  Arterial Line BP: ()/(0-85) 22/0    Physical Exam:  Chest: Lungs are clear with equal breath sounds bilaterally.  Chest tube  site is clean and dry.  Chest tube was removed completely and intact.  Incisions are clean and dry without signs of infection.    Cardiac: Regular rate and rhythm.  No murmurs or gallops.  No dependent edema.    Discharge Disposition  Home today    Discharge Medications     Discharge Medications      New Medications      Instructions Start Date   acetaminophen 500 MG tablet  Commonly known as: TYLENOL   1,000 mg, Oral, Every 8 Hours Scheduled      gabapentin 300 MG capsule  Commonly known as: NEURONTIN   300 mg, Oral, Every 8 Hours Scheduled      oxyCODONE 5 MG immediate release tablet  Commonly known as: ROXICODONE   5 mg, Oral, Every 6 Hours PRN         Changes to Medications      Instructions Start Date   ibuprofen 800 MG tablet  Commonly known as: ADVIL,MOTRIN  What changed: additional instructions   800 mg, Oral, 3 Times Daily PRN      propranolol LA 60 MG 24 hr capsule  Commonly known as: Inderal LA  What changed: when to take this   60 mg, Oral, Daily      simvastatin 20 MG tablet  Commonly known as: ZOCOR  What changed: when to take this   20 mg, Oral, Daily         Continue These Medications      Instructions Start Date   ALPRAZolam 0.5 MG tablet  Commonly known as: XANAX   0.5 mg, Oral, Nightly PRN, for anxiety      buPROPion  MG 24 hr tablet  Commonly known as: WELLBUTRIN XL   150 mg, Oral, Daily      FLUoxetine 40 MG capsule  Commonly known as: PROzac   40 mg, Oral, Daily      levothyroxine 25 MCG tablet  Commonly known as: SYNTHROID, LEVOTHROID   25 mcg, Oral, Daily      lisinopril 20 MG tablet  Commonly known as: PRINIVIL,ZESTRIL   20 mg, Oral, Daily      omeprazole 20 MG capsule  Commonly known as: PrilOSEC   20 mg, Oral, Daily      rizatriptan 10 MG tablet  Commonly known as: MAXALT   10 mg, Oral, Once As Needed, May repeat in 2 hours if needed             Discharge Instructions:  · No heavy lifting, pushing, pulling greater than 10 pounds.  · No driving up until 2 weeks after surgery and no  longer taking narcotics.  · Resume home diet as tolerated.  · Continue incentive spirometer at least 4 times per day.  · Remove dressing from post chest tube site after 48 hours, may shower and clean surgical sites with antibacterial soap or hydrogen peroxide, and apply gauze dressing or band-aid as needed for any drainage.  No dressing needed once no longer draining.          Follow-up Appointments  Future Appointments   Date Time Provider Department Center   5/11/2021  9:50 AM LAB CHAIR 4 [facility]  [facility]   5/11/2021 10:20 AM Salma Snell MD [facility]   5/11/2021 10:40 AM Rossy Hutton CSW [facility]         Test Results Pending at Discharge  Pending Labs     Order Current Status    Tissue Pathology Exam In process          For any questions regarding patient's stay, please refer to patient's chart.    Raphael Kerr III, MD  [facility]  05/01/21  12:11 EDT

## 2021-05-03 ENCOUNTER — TRANSITIONAL CARE MANAGEMENT TELEPHONE ENCOUNTER (OUTPATIENT)
Dept: CALL CENTER | Facility: HOSPITAL | Age: 56
End: 2021-05-03

## 2021-05-03 NOTE — CASE MANAGEMENT/SOCIAL WORK
Case Management Discharge Note      Final Note: Pt discharged home, no known needs.  MONTY Salas RN         Selected Continued Care - Discharged on 5/1/2021 Admission date: 4/30/2021 - Discharge disposition: Home or Self Care    Destination    No services have been selected for the patient.              Durable Medical Equipment    No services have been selected for the patient.              Dialysis/Infusion    No services have been selected for the patient.              Home Medical Care    No services have been selected for the patient.              Therapy    No services have been selected for the patient.              Community Resources    No services have been selected for the patient.                  Transportation Services  Private: Car    Final Discharge Disposition Code: 01 - home or self-care

## 2021-05-03 NOTE — OUTREACH NOTE
Call Center TCM Note      Responses   [facility] patient discharged from?  [place]   Does the patient have one of the following disease processes/diagnoses(primary or secondary)?  General Surgery   TCM attempt successful?  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  Yes   Person spoke with today (if not patient) and relationship  / [facility]K   Meds reviewed with patient/caregiver?  Yes   Is the patient having any side effects they believe may be caused by any medication additions or changes?  No   Does the patient have all medications related to this admission filled (includes all antibiotics, pain medications, etc.)  Yes   Is the patient taking all medications as directed (includes completed medication regime)?  Yes   Does the patient have a follow up appointment scheduled with their surgeon?  Yes   Has the patient kept scheduled appointments due by today?  N/A   Comments   reports wife has an appt with cancer doctor next week on 5/11. He declines to schedule a followup with PCP at this time.    Has home health visited the patient within 72 hours of discharge?  N/A   Psychosocial issues?  No   Did the patient receive a copy of their discharge instructions?  Yes   Nursing interventions  Reviewed instructions with patient   What is the patient's perception of their health status since discharge?  Improving   Is the patient /caregiver able to teach back basic post-op care?  Take showers only when approved by MD-sponge bathe until then, Drive as instructed by MD in discharge instructions, No tub bath, swimming, or hot tub until instructed by MD, Keep incision areas clean,dry and protected, Lifting as instructed by MD in discharge instructions   Is the patient/caregiver able to teach back signs and symptoms of incisional infection?   Increased redness, swelling or pain at the incisonal site, Increased drainage or bleeding, Incisional warmth, Pus or odor from incision, Fever   Is the patient/caregiver able to teach back steps to recovery at home?  Set small, achievable goals for return to baseline health, Rest and rebuild strength, gradually increase activity, Make a list of questions for surgeon's appointment   If the patient is a current smoker, are they able to teach back resources for cessation?  Not a smoker   Is the patient/caregiver able to teach back the hierarchy of who to call/visit for symptoms/problems? PCP, Specialist, Home health nurse, Urgent Care, ED, 911  Yes   TCM call completed?  Yes          Dipak Giles RN    5/3/2021, 09:35 EDT

## 2021-05-11 ENCOUNTER — TELEPHONE (OUTPATIENT)
Dept: SURGERY | Facility: CLINIC | Age: 56
End: 2021-05-11

## 2021-05-11 ENCOUNTER — OFFICE VISIT (OUTPATIENT)
Dept: ONCOLOGY | Facility: CLINIC | Age: 56
End: 2021-05-11

## 2021-05-11 ENCOUNTER — TELEPHONE (OUTPATIENT)
Dept: ONCOLOGY | Facility: CLINIC | Age: 56
End: 2021-05-11

## 2021-05-11 ENCOUNTER — LAB (OUTPATIENT)
Dept: LAB | Facility: HOSPITAL | Age: 56
End: 2021-05-11

## 2021-05-11 VITALS
RESPIRATION RATE: 16 BRPM | WEIGHT: 171.6 LBS | HEART RATE: 76 BPM | BODY MASS INDEX: 30.41 KG/M2 | DIASTOLIC BLOOD PRESSURE: 86 MMHG | TEMPERATURE: 97.3 F | SYSTOLIC BLOOD PRESSURE: 130 MMHG | OXYGEN SATURATION: 96 % | HEIGHT: 63 IN

## 2021-05-11 DIAGNOSIS — C50.411 MALIGNANT NEOPLASM OF UPPER-OUTER QUADRANT OF RIGHT BREAST IN FEMALE, ESTROGEN RECEPTOR POSITIVE (HCC): Primary | ICD-10-CM

## 2021-05-11 DIAGNOSIS — C79.9 METASTATIC ADENOCARCINOMA (HCC): ICD-10-CM

## 2021-05-11 DIAGNOSIS — Z17.0 MALIGNANT NEOPLASM OF UPPER-OUTER QUADRANT OF RIGHT BREAST IN FEMALE, ESTROGEN RECEPTOR POSITIVE (HCC): Primary | ICD-10-CM

## 2021-05-11 DIAGNOSIS — Z17.0 MALIGNANT NEOPLASM OF UPPER-OUTER QUADRANT OF RIGHT BREAST IN FEMALE, ESTROGEN RECEPTOR POSITIVE (HCC): ICD-10-CM

## 2021-05-11 DIAGNOSIS — C50.411 MALIGNANT NEOPLASM OF UPPER-OUTER QUADRANT OF RIGHT BREAST IN FEMALE, ESTROGEN RECEPTOR POSITIVE (HCC): ICD-10-CM

## 2021-05-11 PROCEDURE — 85025 COMPLETE CBC W/AUTO DIFF WBC: CPT

## 2021-05-11 PROCEDURE — 80053 COMPREHEN METABOLIC PANEL: CPT

## 2021-05-11 PROCEDURE — 36415 COLL VENOUS BLD VENIPUNCTURE: CPT

## 2021-05-11 PROCEDURE — 99215 OFFICE O/P EST HI 40 MIN: CPT | Performed by: INTERNAL MEDICINE

## 2021-05-11 RX ORDER — ANASTROZOLE 1 MG/1
1 TABLET ORAL DAILY
Qty: 30 TABLET | Refills: 3 | Status: SHIPPED | OUTPATIENT
Start: 2021-05-11 | End: 2021-05-21 | Stop reason: ALTCHOICE

## 2021-05-11 NOTE — PROGRESS NOTES
Subjective   Holli Patel is a 55 y.o. female.  Referred by Dr. 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    3/1/2021-bilateral screening mammogram-microcalcifications seen in the posterior one third of the lateral aspect of the right breast.  Area of focal asymmetry seen in the middle one third of the upper inner quadrant of the left breast.    3/1/2021-DEXA scan-T score of -2.2 on the lumbar spine and T score of -2.3 in the left hip and T score of -1.9 in the right hip.  Findings consistent with osteopenia    3/23/2021-screening lung CT-there is a 10 x 11 mm solid nodule in the left upper lobe.  Enlarged AP window lymph node measuring 14 x 10 mm.  Suggestion of a possible 9 mm left hilar lymph node.  Heavy coronary artery calcification.    3/23/2021-diagnostic mammogram bilateral-cluster of microcalcifications in the middle third lateral aspect of the right breast.  Left breast demonstrates persistence of the area of focal asymmetry in the region.    Ultrasound-left breast ultrasound at 10 o'clock position, 6 cm from the nipple there is a 0.4 cm irregular hypoechoic lesion.  Stereotactic biopsy of the right breast calcifications recommended.  Ultrasound-guided biopsy of the left breast lesion recommended    4/7/2021-right breast ear tactic biopsy and left breast ultrasound-guided biopsy  Pathology  Right breast-invasive ductal carcinoma, grade 2, lymphovascular invasion present, ER +99% strong, VA +80% moderate, HER-2 negative, Ki-67 12%  Left breast upper inner quadrant 10 o'clock position biopsy, invasive ductal carcinoma, grade 2, ER +99% strong, VA +40% strong, HER-2 2+ on immunohistochemistry, nonamplified on FISH Ki-67 10%    4/14/2021-PET/CT-FDG avid aortopulmonary window lymph node measures 0.9 cm with an SUV of 7.5.  FDG avid left hilar lymph node  measures 1 cm with an SUV of 17.2.  Irregular soft tissue density in the right breast measuring 3.7 x 3.8 cm is favored to be secondary to the recent breast biopsy.  1.1 cm pulmonary nodule within the left upper lobe with SUV 9.7 .  Sub-6 mm pulmonary nodules are present in the right lower lobe.  No evidence of lymphadenopathy or metastatic disease in the abdomen.  Focal area of FDG uptake within the central canal posterior to T11-T12 displaced demonstrating an SUV of 5.5 thought to be reactive however MRI is recommended for further evaluation.    She was seen by Dr. Molina who initially planned for breast surgery however  due to the PET abnormalities she has been referred to Dr. Kerr who plans to do a wound on 4/30/2021.  Dr. Molina's and Dr. Kerr's notes reviewed.    Patient denies any family history of breast cancer.  Her mother had lymphoma.  Maternal grandmother had colon cancer.  Prior to that screening mammogram she did not have any palpable abnormalities of either breast.    She has been a heavy smoker for about 40 years and smoked 2 packs/day.  Denies any recent weight changes, new bone pains, cough, abdominal pain nausea vomiting constipation or diarrhea.  She does have anxiety and has been on several medications.  She has recently been started on Xanax to help with the mood and also with insomnia.    Patient had a video-assisted thoracoscopy and mediastinal lymphadenectomy on 4/30/2021.  L5-L6 lymph nodes were biopsied however the small pulmonary nodule in the left upper lobe was not difficult to find.  Pathology showed moderately differentiated adenocarcinoma which is CK7 and TTF-1 positive.  Consistent with lung primary.  Ki-67 85%.    Interval history  Ms. Patel presents to the clinic accompanied by her .  She is recovering well from the procedure.  No new complaints.   Anxiety unchanged.  Continues Xanax.  Continues to have insomnia.    The following portions of the patient's history  were reviewed and updated as appropriate: allergies, current medications, past family history, past medical history, past social history, past surgical history and problem list.    Past Medical History:   Diagnosis Date   • Anxiety    • SHAYY (generalized anxiety disorder)     RELATED HIGH BLOOD PRESSURE   • GERD (gastroesophageal reflux disease)    • High blood pressure     ANXIETY RELATED   • Hyperlipidemia    • Hypothyroidism    • Insomnia    • Lung nodule    • Malignant neoplasm of upper-outer quadrant of right breast in female, estrogen receptor positive (CMS/HCC) 4/13/2021   • Migraine    • PONV (postoperative nausea and vomiting)    • Rotator cuff tear 2005    NO ISSUES NOW   • SOB (shortness of breath)         Past Surgical History:   Procedure Laterality Date   • BREAST BIOPSY Bilateral 04/07/2021    Right Breast 10 o'clock & Left breast 10 o'clock 6 cm from nipple, [facility]   • CLOSED REDUCTION HIP DISLOCATION Left 4/30/2021    Procedure: BRONCHOSCOPY, LEFT VIDEO ASSITED THORACOSCOPY, ROBOTIC ASSSITED MEDIASTINAL LYMPHADENECTOMY WITH INTERCOSTAL NERVE BLOCKS;  Surgeon: Raphael Kerr III, MD;  Location: [facility];  Service: [facility];  Laterality: Left;   • WRIST SURGERY Right         Family History   Problem Relation Age of Onset   • Cancer Father         LYMPHATIC   • Alcohol abuse Brother    • Colon cancer Maternal Grandmother    • Malig Hyperthermia Neg Hx         Social History     Socioeconomic History   • Marital status:      Spouse name: Not on file   • Number of children: Not on file   • Years of education: Not on file   • Highest education level: Not on file   Tobacco Use   • Smoking status: Current Every Day Smoker     Packs/day: 1.00     Types: Electronic Cigarette, Cigarettes     Start date: 4/14/1981   • Smokeless tobacco: Never Used   Vaping Use   • Vaping Use: Never used   Substance and Sexual Activity   • Alcohol use: Never   • Drug use: Never   • Sexual activity: Yes        OB  "History        2    Para   2    Term   2            AB        Living           SAB        TAB        Ectopic        Molar        Multiple        Live Births                Age of menarche-10  Age at menopause-42  Oral contraceptive pill use-15 years  No HRT use  She has 2 children  Age at first live childbirth-17    No Known Allergies         Review of Systems   Review of systems as mentioned in the HPI      Objective   Blood pressure 130/86, pulse 76, temperature 97.3 °F (36.3 °C), temperature source Temporal, resp. rate 16, height 160 cm (62.99\"), weight 77.8 kg (171 lb 9.6 oz), SpO2 96 %, not currently breastfeeding.   Physical Exam  Vitals reviewed.   Constitutional:       Appearance: She is obese.   HENT:      Head: Normocephalic.      Right Ear: External ear normal.      Left Ear: External ear normal.      Nose: Nose normal.      Mouth/Throat:      Mouth: Mucous membranes are moist.      Pharynx: Oropharynx is clear.   Eyes:      Extraocular Movements: Extraocular movements intact.      Conjunctiva/sclera: Conjunctivae normal.      Pupils: Pupils are equal, round, and reactive to light.   Cardiovascular:      Rate and Rhythm: Normal rate and regular rhythm.      Pulses: Normal pulses.      Heart sounds: Normal heart sounds.   Pulmonary:      Effort: Pulmonary effort is normal.      Breath sounds: Normal breath sounds.   Abdominal:      General: Abdomen is flat. Bowel sounds are normal. There is no distension.      Palpations: There is no mass.   Genitourinary:     General: Normal vulva.   Musculoskeletal:         General: Normal range of motion.      Cervical back: Normal range of motion.   Skin:     General: Skin is warm.   Neurological:      General: No focal deficit present.      Mental Status: She is alert and oriented to person, place, and time.   Psychiatric:         Mood and Affect: Mood normal.         Behavior: Behavior normal.         Thought Content: Thought content normal.         " Judgment: Judgment normal.       Breast Exam: Right breast with a large postbiopsy hematoma in the upper outer quadrant measuring 7 cm.  Visible ecchymosis.  No other palpable abnormalities left breast with no palpable abnormalities.  No right or left axillary lymphadenopathy    I have reexamined the patient and the results are consistent with the previously documented exam. Salma Snell MD     Lab on 05/11/2021   Component Date Value Ref Range Status   • WBC 05/11/2021 11.54* 3.40 - 10.80 10*3/mm3 Final   • RBC 05/11/2021 4.93  3.77 - 5.28 10*6/mm3 Final   • Hemoglobin 05/11/2021 13.1  12.0 - 15.9 g/dL Final   • Hematocrit 05/11/2021 42.3  34.0 - 46.6 % Final   • MCV 05/11/2021 85.8  79.0 - 97.0 fL Final   • MCH 05/11/2021 26.6  26.6 - 33.0 pg Final   • MCHC 05/11/2021 31.0* 31.5 - 35.7 g/dL Final   • RDW 05/11/2021 14.0  12.3 - 15.4 % Final   • RDW-SD 05/11/2021 43.8  37.0 - 54.0 fl Final   • MPV 05/11/2021 9.3  6.0 - 12.0 fL Final   • Platelets 05/11/2021 435  140 - 450 10*3/mm3 Final   • Neutrophil % 05/11/2021 65.9  42.7 - 76.0 % Final   • Lymphocyte % 05/11/2021 23.7  19.6 - 45.3 % Final   • Monocyte % 05/11/2021 6.3  5.0 - 12.0 % Final   • Eosinophil % 05/11/2021 2.5  0.3 - 6.2 % Final   • Basophil % 05/11/2021 0.9  0.0 - 1.5 % Final   • Immature Grans % 05/11/2021 0.7* 0.0 - 0.5 % Final   • Neutrophils, Absolute 05/11/2021 7.60* 1.70 - 7.00 10*3/mm3 Final   • Lymphocytes, Absolute 05/11/2021 2.74  0.70 - 3.10 10*3/mm3 Final   • Monocytes, Absolute 05/11/2021 0.73  0.10 - 0.90 10*3/mm3 Final   • Eosinophils, Absolute 05/11/2021 0.29  0.00 - 0.40 10*3/mm3 Final   • Basophils, Absolute 05/11/2021 0.10  0.00 - 0.20 10*3/mm3 Final   • Immature Grans, Absolute 05/11/2021 0.08* 0.00 - 0.05 10*3/mm3 Final   • nRBC 05/11/2021 0.0  0.0 - 0.2 /100 WBC Final   Admission on 04/30/2021, Discharged on 05/01/2021   Component Date Value Ref Range Status   • Addendum 3 04/30/2021    Final                    Value:This  result contains rich text formatting which cannot be displayed here.   • Addendum 2 04/30/2021    Final                    Value:This result contains rich text formatting which cannot be displayed here.   • Addendum 04/30/2021    Final                    Value:This result contains rich text formatting which cannot be displayed here.   • Case Report 04/30/2021    Final                    Value:Surgical Pathology Report                         Case: [id number]                                  Authorizing Provider:  Raphael Kerr III, MD  Collected:           04/30/2021 10:53 AM          Ordering Location:     [facility]  Received:            04/30/2021 10:59 AM                                 MAIN OR                                                                      Pathologist:           Gurvinder Hernandez MD                                                         Specimens:   1) - Lymph Node, L6 LYMPH NODE FOR FROZEN - OR 8 - CALL [phone] WITH RESULTS                           2) - Lymph Node, L5 LYMPH NODE                                                            • Final Diagnosis 04/30/2021    Final                    Value:This result contains rich text formatting which cannot be displayed here.   • Comment 04/30/2021    Final                    Value:This result contains rich text formatting which cannot be displayed here.   • Intraoperative Consultation 04/30/2021    Final                    Value:This result contains rich text formatting which cannot be displayed here.   • Gross Description 04/30/2021    Final                    Value:This result contains rich text formatting which cannot be displayed here.   • Special Stains 04/30/2021    Final                    Value:This result contains rich text formatting which cannot be displayed here.   • Glucose 05/01/2021 115* 65 - 99 mg/dL Final   • BUN 05/01/2021 16  6 - 20 mg/dL Final   • Creatinine 05/01/2021 0.85  0.57 - 1.00 mg/dL Final   •  Sodium 05/01/2021 137  136 - 145 mmol/L Final   • Potassium 05/01/2021 4.5  3.5 - 5.2 mmol/L Final   • Chloride 05/01/2021 106  98 - 107 mmol/L Final   • CO2 05/01/2021 21.6* 22.0 - 29.0 mmol/L Final   • Calcium 05/01/2021 8.7  8.6 - 10.5 mg/dL Final   • eGFR Non African Amer 05/01/2021 69  >60 mL/min/1.73 Final   • BUN/Creatinine Ratio 05/01/2021 18.8  7.0 - 25.0 Final   • Anion Gap 05/01/2021 9.4  5.0 - 15.0 mmol/L Final   • WBC 05/01/2021 13.43* 3.40 - 10.80 10*3/mm3 Final   • RBC 05/01/2021 4.39  3.77 - 5.28 10*6/mm3 Final   • Hemoglobin 05/01/2021 11.9* 12.0 - 15.9 g/dL Final   • Hematocrit 05/01/2021 38.3  34.0 - 46.6 % Final   • MCV 05/01/2021 87.2  79.0 - 97.0 fL Final   • MCH 05/01/2021 27.1  26.6 - 33.0 pg Final   • MCHC 05/01/2021 31.1* 31.5 - 35.7 g/dL Final   • RDW 05/01/2021 13.8  12.3 - 15.4 % Final   • RDW-SD 05/01/2021 44.2  37.0 - 54.0 fl Final   • MPV 05/01/2021 9.8  6.0 - 12.0 fL Final   • Platelets 05/01/2021 300  140 - 450 10*3/mm3 Final   • Neutrophil % 05/01/2021 76.5* 42.7 - 76.0 % Final   • Lymphocyte % 05/01/2021 13.2* 19.6 - 45.3 % Final   • Monocyte % 05/01/2021 9.6  5.0 - 12.0 % Final   • Eosinophil % 05/01/2021 0.1* 0.3 - 6.2 % Final   • Basophil % 05/01/2021 0.2  0.0 - 1.5 % Final   • Immature Grans % 05/01/2021 0.4  0.0 - 0.5 % Final   • Neutrophils, Absolute 05/01/2021 10.28* 1.70 - 7.00 10*3/mm3 Final   • Lymphocytes, Absolute 05/01/2021 1.77  0.70 - 3.10 10*3/mm3 Final   • Monocytes, Absolute 05/01/2021 1.29* 0.10 - 0.90 10*3/mm3 Final   • Eosinophils, Absolute 05/01/2021 0.01  0.00 - 0.40 10*3/mm3 Final   • Basophils, Absolute 05/01/2021 0.03  0.00 - 0.20 10*3/mm3 Final   • Immature Grans, Absolute 05/01/2021 0.05  0.00 - 0.05 10*3/mm3 Final   • nRBC 05/01/2021 0.0  0.0 - 0.2 /100 WBC Final   Lab on 04/28/2021   Component Date Value Ref Range Status   • SARS-CoV-2 PCR 04/28/2021 Not Detected  Not Detected Final    Nucleic acid specific to SARS-CoV-2 (COVID-19) virus was not  detected in  this sample by the TaqPath (TM) COVID-19 Combo Kit.          SARS-CoV-2 (COVID-19) nucleic acid testing performed using [employer] Aptima (R) SARS-CoV-2 Assay or [employer] TaqPath (TM)  COVID-19 Combo Kit as indicated above under Emergency Use Authorization (EUA) from the FDA. Aptima (R) and TaqPath (TM) test performance  characteristics verified by [facility] in accordance with the FDAs Guidance Document (Policy for Diagnostic Tests for Coronavirus Disease-2019  during the Public Health Emergency) issued on March 16, 2020. The laboratory is regulated under CLIA as qualified to perform high-complexity testing  Unless otherwise noted, all testing was performed at [facility], CLIA No. [id number], [facility] Licensee No. [id number]   Lab on 04/28/2021   Component Date Value Ref Range Status   • WBC 04/28/2021 9.84  3.40 - 10.80 10*3/mm3 Final   • RBC 04/28/2021 4.86  3.77 - 5.28 10*6/mm3 Final   • Hemoglobin 04/28/2021 13.6  12.0 - 15.9 g/dL Final   • Hematocrit 04/28/2021 40.4  34.0 - 46.6 % Final   • MCV 04/28/2021 83.1  79.0 - 97.0 fL Final   • MCH 04/28/2021 28.0  26.6 - 33.0 pg Final   • MCHC 04/28/2021 33.7  31.5 - 35.7 g/dL Final   • RDW 04/28/2021 14.3  12.3 - 15.4 % Final   • RDW-SD 04/28/2021 43.5  37.0 - 54.0 fl Final   • MPV 04/28/2021 9.5  6.0 - 12.0 fL Final   • Platelets 04/28/2021 275  140 - 450 10*3/mm3 Final   • Neutrophil % 04/28/2021 65.3  42.7 - 76.0 % Final   • Lymphocyte % 04/28/2021 24.0  19.6 - 45.3 % Final   • Monocyte % 04/28/2021 7.3  5.0 - 12.0 % Final   • Eosinophil % 04/28/2021 2.3  0.3 - 6.2 % Final   • Basophil % 04/28/2021 0.7  0.0 - 1.5 % Final   • Immature Grans % 04/28/2021 0.4  0.0 - 0.5 % Final   • Neutrophils, Absolute 04/28/2021 6.42  1.70 - 7.00 10*3/mm3 Final   • Lymphocytes, Absolute 04/28/2021 2.36  0.70 - 3.10 10*3/mm3 Final   • Monocytes, Absolute 04/28/2021 0.72  0.10 - 0.90 10*3/mm3 Final   • Eosinophils, Absolute 04/28/2021 0.23  0.00 - 0.40  10*3/mm3 Final   • Basophils, Absolute 04/28/2021 0.07  0.00 - 0.20 10*3/mm3 Final   • Immature Grans, Absolute 04/28/2021 0.04  0.00 - 0.05 10*3/mm3 Final   • nRBC 04/28/2021 0.0  0.0 - 0.2 /100 WBC Final   Pre-Admission Testing on 04/26/2021   Component Date Value Ref Range Status   • QT Interval 04/26/2021 400  ms Final   • Glucose 04/26/2021 101* 65 - 99 mg/dL Final   • BUN 04/26/2021 17  6 - 20 mg/dL Final   • Creatinine 04/26/2021 0.79  0.57 - 1.00 mg/dL Final   • Sodium 04/26/2021 140  136 - 145 mmol/L Final   • Potassium 04/26/2021 4.4  3.5 - 5.2 mmol/L Final   • Chloride 04/26/2021 103  98 - 107 mmol/L Final   • CO2 04/26/2021 27.2  22.0 - 29.0 mmol/L Final   • Calcium 04/26/2021 9.5  8.6 - 10.5 mg/dL Final   • eGFR Non African Amer 04/26/2021 75  >60 mL/min/1.73 Final   • BUN/Creatinine Ratio 04/26/2021 21.5  7.0 - 25.0 Final   • Anion Gap 04/26/2021 9.8  5.0 - 15.0 mmol/L Final   • Protime 04/26/2021 12.2  11.7 - 14.2 Seconds Final   • INR 04/26/2021 0.92  0.90 - 1.10 Final   • ABO Type 04/26/2021 O   Final   • RH type 04/26/2021 Positive   Final   • Antibody Screen 04/26/2021 Negative   Final   • T&S Expiration Date 04/26/2021 5/10/2021 11:59:00 PM   Final   • WBC 04/26/2021 8.53  3.40 - 10.80 10*3/mm3 Final   • RBC 04/26/2021 4.85  3.77 - 5.28 10*6/mm3 Final   • Hemoglobin 04/26/2021 12.9  12.0 - 15.9 g/dL Final   • Hematocrit 04/26/2021 40.0  34.0 - 46.6 % Final   • MCV 04/26/2021 82.5  79.0 - 97.0 fL Final   • MCH 04/26/2021 26.6  26.6 - 33.0 pg Final   • MCHC 04/26/2021 32.3  31.5 - 35.7 g/dL Final   • RDW 04/26/2021 13.9  12.3 - 15.4 % Final   • RDW-SD 04/26/2021 41.2  37.0 - 54.0 fl Final   • MPV 04/26/2021 9.8  6.0 - 12.0 fL Final   • Platelets 04/26/2021 310  140 - 450 10*3/mm3 Final   • Neutrophil % 04/26/2021 58.3  42.7 - 76.0 % Final   • Lymphocyte % 04/26/2021 27.1  19.6 - 45.3 % Final   • Monocyte % 04/26/2021 7.7  5.0 - 12.0 % Final   • Eosinophil % 04/26/2021 5.3  0.3 - 6.2 % Final   •  Basophil % 04/26/2021 1.2  0.0 - 1.5 % Final   • Immature Grans % 04/26/2021 0.4  0.0 - 0.5 % Final   • Neutrophils, Absolute 04/26/2021 4.98  1.70 - 7.00 10*3/mm3 Final   • Lymphocytes, Absolute 04/26/2021 2.31  0.70 - 3.10 10*3/mm3 Final   • Monocytes, Absolute 04/26/2021 0.66  0.10 - 0.90 10*3/mm3 Final   • Eosinophils, Absolute 04/26/2021 0.45* 0.00 - 0.40 10*3/mm3 Final   • Basophils, Absolute 04/26/2021 0.10  0.00 - 0.20 10*3/mm3 Final   • Immature Grans, Absolute 04/26/2021 0.03  0.00 - 0.05 10*3/mm3 Final   • nRBC 04/26/2021 0.0  0.0 - 0.2 /100 WBC Final   Hospital Outpatient Visit on 04/14/2021   Component Date Value Ref Range Status   • Glucose 04/14/2021 110  70 - 130 mg/dL Final        XR Chest 1 View    Result Date: 5/1/2021  Electronically signed by Cecil Timmons M.D. on 05-01-21 at 0714    NM Pet Skull Base To Mid Thigh    Result Date: 4/14/2021  1.  Intensely FDG avid left upper lobe pulmonary nodule with hypermetabolic left hilar and mediastinal adenopathy likely representing malignancy and metastatic disease. Given patient history, these findings represent either metastatic breast cancer versus primary pulmonary malignancy with metastatic disease. Left hilar nodes appear amenable to endobronchial biopsy. 2.  Non-FDG avid soft tissue tissue density lesion within the right breast favored to be postsurgical changes related to recent biopsy. 3.  Focal area of FDG uptake within the central canal posterior to T11-12 disc space. While findings may be reactive, they are indeterminate and further evaluation with thoracic spine MRI with and without contrast is recommended. 4.  Subtle area of asymmetric uptake within the right acetabulum without an underlying lesion seen on noncontrast CT. Findings are favored to be artifactual. However, continued close attention on follow-up is recommended to exclude the small possibility of metastatic disease. 5.  Sub-6 mm pulmonary nodules within the right lower lobe  which are indeterminate. Continued attention on follow-up is recommended. 6.  Other findings  This report was finalized on 4/14/2021 1:38 PM by Dr. Serjio Lorenz M.D.       4/28/2021  CBC-WBC 9.84, hemoglobin 13.4, platelets 275    I have reviewed and interpreted all the images independently and details of them have been summarized in the HPI    Assessment/Plan        *Bilateral breast cancer  · Invasive ductal carcinoma in both right and left breast and lesions measure under 1 cm.  No palpable lymphadenopathy although unsure if this has been evaluated by an axillary ultrasound.  No abnormal axillary lymphadenopathy noted on PET.  · Most likely clinical T1b N0 MX, both cancers were noted to be grade 2, ER/CO positive and HER-2 negative and Ki-67 less than 15%  Discussed at length the details of imaging and pathology report.Discussed the origin of breast cancer from the ducts and the lobules and the histological type of breast cancer based on site of origin. Discussed the tumor size, lymph node status and stage of the cancer. Explained the presence of DCIS. Discussed the receptor status including ER, CO and her-2 melinda and their significance in determining the biology and treatment. Also discussed the importance of grade and ki-67.   Explained to her that the stage of the breast cancer would depend upon the biopsy results from the lung.  Given that she has been a heavy smoker and how small the tumors are in the breast and negative axillary exam I do feel like we are dealing with 2 separate primaries.  If that is the case then probably the best approach would be to proceed with a lumpectomy of both breasts if possible and start definitive treatment for lung cancer.  I do notice that she has a huge hematoma in the right breast which may make a lumpectomy difficult on the right side.  If that is the case then we could pursue neoadjuvant endocrine therapy along with treatment for the lung cancer followed by definitive  surgery  VATS on 4/30/2021  Biopsy confirms adenocarcinoma of pulmonary primary  Discussed with Dr. Molina about delaying breast surgery and she is in agreement  We discussed starting neoadjuvant endocrine therapy with anastrozole 1 mg p.o. daily  Adverse effects of anastrozole including but not limited to hot flashes, vaginal dryness, sexual dysfunction, mood changes, insomnia, fatigue, bone mineral density changes discussed.  Prescription sent to pharmacy today  · Bilateral axillary ultrasounds not performed.  This will be ordered    *Adenocarcinoma of the left lung  · Most likely stage T1 N2 M0  · MRI of the thoracic spine ordered to further evaluate the abnormality seen on PET  · This has not been performed  · If the MRI is negative then most likely stage III  · She would have to be treated with concurrent chemoradiation  · Discussed with Dr. Kerr and not a surgical candidate  · Discussed concurrent chemoradiation with carboplatin and Alimta.  · DPS score noted to be 0 therefore she may not derive much benefit from durvalumab.  · Adverse effects of chemotherapy including but not limited to moderate suppression, increased risk of infections, alopecia, nausea, vomiting, altered taste, diarrhea, mucositis discussed    *Bilateral breast cancer-no family history of breast cancer but given synchronous breast cancer genetic testing has been sent.  Genetic testing with a variant of unknown significance.    *Anxiety-she is on several different medications currently.  Referral has been made to supportive oncology clinic today.  She is requesting an increase in the dose of the Xanax.  Okay to increase the dose to 1 mg nightly to help with sleep.  Further management of these medications will be deferred to supportive oncology once she has established care with them.    *Tobacco use-she is working on smoking cessation.     *Hypertension-continue lisinopril.  Blood pressure 130s over 80s    *Follow-up-1 week    40 minutes  spent on the encounter reviewing the pathology, treatment plan documentation of the same day

## 2021-05-11 NOTE — TELEPHONE ENCOUNTER
Caller: PETER    Relationship to patient: [facility]    Best call back number: [phone]    STATES THE PT WAS IN HER WORK QUE FOR A CONSULTATION WITH A RADIATION ONCOLOGIST, BUT SHE DID NOT SEE WHERE DR NAM WAS WANTING THE PT TO BE SEEN BY A RADIATION ONCOLOGIST. CALLING TO CHECK WITH DR NAM TO MAKE SURE THE PT NEEDS TO BE SCHEDULED TO BE SEEN BY A RADIATION ONCOLOGIST AND TO GET THE DX THE PT WOULD BE SEEN FOR. PLEASE CALL BACK WHEN POSSIBLE

## 2021-05-11 NOTE — TELEPHONE ENCOUNTER
Returned call to [facility], informed her she does need to be seen for new lung ca.

## 2021-05-12 ENCOUNTER — TELEPHONE (OUTPATIENT)
Dept: ONCOLOGY | Facility: CLINIC | Age: 56
End: 2021-05-12

## 2021-05-12 ENCOUNTER — DOCUMENTATION (OUTPATIENT)
Dept: ONCOLOGY | Facility: CLINIC | Age: 56
End: 2021-05-12

## 2021-05-12 NOTE — TELEPHONE ENCOUNTER
BHARATHIW contacted the patient to confirm her appointment with Bina Armendariz on Thursday 5/13 at 3:00.  The patient stated the appointment works for her, and she is confirmed.

## 2021-05-12 NOTE — PROGRESS NOTES
CSW met with the patient on 5/11/21 to follow up on support/resource needs.      The patient stated she has not received the [facility] [place] by [facility] financial assistance application in the mail thus far.  CSW provided the patient with a copy of the application and provided education regarding completion.      We also discussed scheduling an appointment with Bina Armendariz for medication evaluation, as the patient is experiencing elevated anxiety and she continues to have difficulty sleeping.  The patient reports her current medications are ineffective and she is open to medication evaluation with Bina.      CSW will coordinate an appointment with Bina and will follow back up with the patient.      The patient did not identify additional support/resource needs at this time.

## 2021-05-13 ENCOUNTER — OFFICE VISIT (OUTPATIENT)
Dept: PSYCHIATRY | Facility: HOSPITAL | Age: 56
End: 2021-05-13

## 2021-05-13 ENCOUNTER — PREP FOR SURGERY (OUTPATIENT)
Dept: OTHER | Facility: HOSPITAL | Age: 56
End: 2021-05-13

## 2021-05-13 ENCOUNTER — TELEPHONE (OUTPATIENT)
Dept: SURGERY | Facility: CLINIC | Age: 56
End: 2021-05-13

## 2021-05-13 ENCOUNTER — MDT ASSESSMENT (OUTPATIENT)
Dept: OTHER | Facility: HOSPITAL | Age: 56
End: 2021-05-13

## 2021-05-13 DIAGNOSIS — Z17.0 MALIGNANT NEOPLASM OF UPPER-OUTER QUADRANT OF RIGHT BREAST IN FEMALE, ESTROGEN RECEPTOR POSITIVE (HCC): Primary | ICD-10-CM

## 2021-05-13 DIAGNOSIS — R59.0 MEDIASTINAL ADENOPATHY: ICD-10-CM

## 2021-05-13 DIAGNOSIS — C50.411 MALIGNANT NEOPLASM OF UPPER-OUTER QUADRANT OF RIGHT BREAST IN FEMALE, ESTROGEN RECEPTOR POSITIVE (HCC): Primary | ICD-10-CM

## 2021-05-13 DIAGNOSIS — C79.9 METASTATIC ADENOCARCINOMA (HCC): ICD-10-CM

## 2021-05-13 PROCEDURE — 90792 PSYCH DIAG EVAL W/MED SRVCS: CPT | Performed by: NURSE PRACTITIONER

## 2021-05-13 RX ORDER — GABAPENTIN 300 MG/1
300 CAPSULE ORAL TAKE AS DIRECTED
Qty: 90 CAPSULE | Refills: 0 | Status: SHIPPED | OUTPATIENT
Start: 2021-05-13 | End: 2021-05-26 | Stop reason: SDUPTHER

## 2021-05-13 RX ORDER — SODIUM CHLORIDE 0.9 % (FLUSH) 0.9 %
3-10 SYRINGE (ML) INJECTION AS NEEDED
Status: CANCELLED | OUTPATIENT
Start: 2021-05-20

## 2021-05-13 RX ORDER — CEFAZOLIN SODIUM 2 G/100ML
2 INJECTION, SOLUTION INTRAVENOUS ONCE
Status: CANCELLED | OUTPATIENT
Start: 2021-05-20 | End: 2021-05-13

## 2021-05-13 RX ORDER — SODIUM CHLORIDE 0.9 % (FLUSH) 0.9 %
3 SYRINGE (ML) INJECTION EVERY 12 HOURS SCHEDULED
Status: CANCELLED | OUTPATIENT
Start: 2021-05-20

## 2021-05-14 ENCOUNTER — HOSPITAL ENCOUNTER (OUTPATIENT)
Dept: MRI IMAGING | Facility: HOSPITAL | Age: 56
Discharge: HOME | End: 2021-05-14
Admitting: INTERNAL MEDICINE

## 2021-05-14 DIAGNOSIS — C79.9 METASTATIC ADENOCARCINOMA (HCC): ICD-10-CM

## 2021-05-14 DIAGNOSIS — C34.92 ADENOCARCINOMA OF LEFT LUNG (HCC): Primary | ICD-10-CM

## 2021-05-14 PROCEDURE — 70553 MRI BRAIN STEM W/O & W/DYE: CPT

## 2021-05-14 PROCEDURE — A9577 INJ MULTIHANCE: HCPCS | Performed by: INTERNAL MEDICINE

## 2021-05-14 PROCEDURE — 0 GADOBENATE DIMEGLUMINE 529 MG/ML SOLUTION: Performed by: INTERNAL MEDICINE

## 2021-05-14 RX ORDER — OXYCODONE HYDROCHLORIDE 5 MG/1
5 TABLET ORAL EVERY 6 HOURS PRN
Qty: 28 TABLET | Refills: 0 | Status: SHIPPED | OUTPATIENT
Start: 2021-05-14 | End: 2021-05-17 | Stop reason: SDUPTHER

## 2021-05-14 RX ADMIN — GADOBENATE DIMEGLUMINE 16 ML: 529 INJECTION, SOLUTION INTRAVENOUS at 15:32

## 2021-05-17 ENCOUNTER — PREP FOR SURGERY (OUTPATIENT)
Dept: OTHER | Facility: HOSPITAL | Age: 56
End: 2021-05-17

## 2021-05-17 DIAGNOSIS — C34.92 ADENOCARCINOMA OF LEFT LUNG (HCC): ICD-10-CM

## 2021-05-17 RX ORDER — OXYCODONE HYDROCHLORIDE 5 MG/1
5 TABLET ORAL EVERY 6 HOURS PRN
Qty: 28 TABLET | Refills: 0 | Status: SHIPPED | OUTPATIENT
Start: 2021-05-17 | End: 2021-05-24

## 2021-05-18 ENCOUNTER — PRE-ADMISSION TESTING (OUTPATIENT)
Dept: PREADMISSION TESTING | Facility: HOSPITAL | Age: 56
End: 2021-05-18

## 2021-05-18 ENCOUNTER — APPOINTMENT (OUTPATIENT)
Dept: RADIATION ONCOLOGY | Facility: HOSPITAL | Age: 56
End: 2021-05-18

## 2021-05-18 ENCOUNTER — CONSULT (OUTPATIENT)
Dept: RADIATION ONCOLOGY | Facility: HOSPITAL | Age: 56
End: 2021-05-18

## 2021-05-18 VITALS
HEIGHT: 63 IN | SYSTOLIC BLOOD PRESSURE: 101 MMHG | RESPIRATION RATE: 20 BRPM | BODY MASS INDEX: 30.18 KG/M2 | OXYGEN SATURATION: 96 % | HEART RATE: 88 BPM | DIASTOLIC BLOOD PRESSURE: 73 MMHG | TEMPERATURE: 97.1 F | WEIGHT: 170.3 LBS

## 2021-05-18 VITALS
TEMPERATURE: 97.3 F | BODY MASS INDEX: 29.76 KG/M2 | WEIGHT: 168 LBS | SYSTOLIC BLOOD PRESSURE: 94 MMHG | HEART RATE: 74 BPM | DIASTOLIC BLOOD PRESSURE: 68 MMHG | OXYGEN SATURATION: 96 %

## 2021-05-18 DIAGNOSIS — C34.12 PRIMARY ADENOCARCINOMA OF UPPER LOBE OF LEFT LUNG (HCC): Primary | ICD-10-CM

## 2021-05-18 DIAGNOSIS — Z17.0 MALIGNANT NEOPLASM OF UPPER-OUTER QUADRANT OF RIGHT BREAST IN FEMALE, ESTROGEN RECEPTOR POSITIVE (HCC): ICD-10-CM

## 2021-05-18 DIAGNOSIS — C79.9 METASTATIC ADENOCARCINOMA (HCC): ICD-10-CM

## 2021-05-18 DIAGNOSIS — C50.411 MALIGNANT NEOPLASM OF UPPER-OUTER QUADRANT OF RIGHT BREAST IN FEMALE, ESTROGEN RECEPTOR POSITIVE (HCC): ICD-10-CM

## 2021-05-18 PROBLEM — R91.1 NODULE OF UPPER LOBE OF LEFT LUNG: Status: RESOLVED | Noted: 2021-04-22 | Resolved: 2021-05-18

## 2021-05-18 LAB
ANION GAP SERPL CALCULATED.3IONS-SCNC: 10 MMOL/L (ref 5–15)
BUN SERPL-MCNC: 18 MG/DL (ref 6–20)
BUN/CREAT SERPL: 18.6 (ref 7–25)
CALCIUM SPEC-SCNC: 9.6 MG/DL (ref 8.6–10.5)
CHLORIDE SERPL-SCNC: 97 MMOL/L (ref 98–107)
CO2 SERPL-SCNC: 29 MMOL/L (ref 22–29)
CREAT SERPL-MCNC: 0.97 MG/DL (ref 0.57–1)
GFR SERPL CREATININE-BSD FRML MDRD: 59 ML/MIN/1.73
GLUCOSE SERPL-MCNC: 100 MG/DL (ref 65–99)
INR PPP: 0.97 (ref 0.9–1.1)
POTASSIUM SERPL-SCNC: 5 MMOL/L (ref 3.5–5.2)
PROTHROMBIN TIME: 12.7 SECONDS (ref 11.7–14.2)
SARS-COV-2 ORF1AB RESP QL NAA+PROBE: NOT DETECTED
SODIUM SERPL-SCNC: 136 MMOL/L (ref 136–145)

## 2021-05-18 PROCEDURE — 99243 OFF/OP CNSLTJ NEW/EST LOW 30: CPT | Performed by: RADIOLOGY

## 2021-05-18 PROCEDURE — 77334 RADIATION TREATMENT AID(S): CPT | Performed by: RADIOLOGY

## 2021-05-18 PROCEDURE — 36415 COLL VENOUS BLD VENIPUNCTURE: CPT

## 2021-05-18 PROCEDURE — 80048 BASIC METABOLIC PNL TOTAL CA: CPT

## 2021-05-18 PROCEDURE — 77263 THER RADIOLOGY TX PLNG CPLX: CPT | Performed by: RADIOLOGY

## 2021-05-18 PROCEDURE — U0004 COV-19 TEST NON-CDC HGH THRU: HCPCS

## 2021-05-18 PROCEDURE — 85610 PROTHROMBIN TIME: CPT

## 2021-05-18 PROCEDURE — C9803 HOPD COVID-19 SPEC COLLECT: HCPCS

## 2021-05-18 NOTE — PROGRESS NOTES
DIAGNOSIS and REASON FOR CONSULTATION:  Primary adenocarcinoma of upper lobe of left lung (CMS/HCC) - for advice and recommendations regarding the diagnosis    CHIEF COMPLAINT:  For advice and recommendations regarding Primary adenocarcinoma of upper lobe of left lung (CMS/HCC)  HISTORY OF PRESENT ILLNESS:  The patient is a 55 y.o. year old female who was found to have an abnormality on routine screening mammogram dated March 1, 2021.  This revealed microcalcifications in the lateral aspect of the right breast and an area of focal asymmetry in the upper inner quadrant of the left breast.  Diagnostic mammogram and ultrasound on March 23, 2021 confirmed the cluster of microcalcifications in the lateral aspect of the right breast and a 4 mm lesion at the 10 o'clock position of the left breast and biopsy of both was recommended.    She concurrently underwent a low-dose screening chest CT given her tobacco history on March 23, 2021 which showed a 10 x 11 mm solid nodule in the left upper lobe as well as a 2 mm right upper lobe subpleural nodule.    Returning to the breast she underwent stereotactic guided biopsy of the calcifications and ultrasound-guided biopsy of the abnormality in the left breast on April 7, 2021 and that pathology revealed an invasive ductal carcinoma, grade 2 measuring 2 mm from the right breast.  There was no perineural nor lymphovascular invasion noted.  There was associated DCIS of low to intermediate grade with no necrosis.  That tissue was found to be strongly positive for both estrogen and progesterone receptors.  Biopsy from the left breast also revealed invasive ductal carcinoma measuring 4.5 mm, grade 2, again without perineural or lymphovascular invasion.  This tissue was also found to be strongly positive for both estrogen and progesterone receptors.  Both specimens were also negative for the HER-2/melinda oncogene.    She underwent a PET scan on April 14, 2021 which showed hypermetabolic AP  window node measuring 9 mm with an SUV of 7.5, left hilar lymphadenopathy with an SUV of 17.2, irregular lobulated lesion in the right breast measuring 3.7 x 3.8 cm felt to be secondary to recent biopsy, 1.1 cm left upper lobe nodule with an SUV of 9.7, focal area of uptake was noted within the central canal posterior to the T11-12 disc space and subtle area of asymmetric uptake was noted within the right acetabulum felt to be benign.    She completed genetic testing on April 16, 2021 which showed no significant variant.  She went to surgery on April 30, 2021 for a planned bronchoscopy, left video-assisted thoracoscopy and robotic assisted mediastinal lymphadenectomy.  Lymph nodes were harvested from the L5 and L6 region and frozen section revealed metastatic breast cancer.  An attempt was made to find the left upper lobe nodule but it was not able to be identified and the procedure was discontinued.  The final pathology revealed both nodes to be diffusely involved by metastatic moderately differentiated adenocarcinoma felt to be of pulmonary origin.  The L6 met measured 3 cm in greatest dimension and the L5 measured 1.5 cm with extranodal extension noted.  The tissue was found to be negative for PD-L1, ALK and ROS1.  Additionally, she completed an MRI of the brain on May 14, 2021 which showed no evidence of metastatic disease. Therefore this appears to be a T1 N2 M0 adenocarcinoma of lung origin though further evaluation of the thoracic spine is planned for May 25, 2021.    Given the findings of a primary lung cancer, the plan now is to delay further breast surgery, starting neoadjuvant endocrine therapy with anastrozole and proceeding forward with definitive treatment of the lung.  She is not felt to be a surgical candidate and she has already seen Dr. Snell for a medical oncology opinion to discuss combined chemo and radiation. I was asked to see the patient at the request of the referring provider noted above  for advice and recommendations regarding this diagnosis.     Clinically, she has no new respiratory complaints. She did develop symptoms of a UTI after starting the anastrazole and because of that stopped it yesterday. She is still struggling with some anxiety but states that is improving with the formulation of a treatment plan. She has baseline SOA which is unchanged and she denies any change in cough, hemoptysis, dysphagia. She is accompanied by her  who was also a patient of ours.    Performance Status: (1) Restricted in physically strenuous activity, ambulatory and able to do work of light nature  Objective   Past Medical History: she  has a past medical history of Anxiety, Dyspnea on exertion, SHAYY (generalized anxiety disorder), GERD (gastroesophageal reflux disease), High blood pressure, Hyperlipidemia, Hypothyroidism, Insomnia, Lung cancer (CMS/HCC), Lung nodule, Malignant neoplasm of upper-outer quadrant of right breast in female, estrogen receptor positive (CMS/HCC) (4/13/2021), Migraine, and PONV (postoperative nausea and vomiting).    Past Surgical History:  she has a past surgical history that includes Wrist surgery (Right); Breast biopsy (Bilateral, 04/07/2021); Closed reduction hip dislocation (Left, 4/30/2021); and Colonoscopy.    Meds:    Current Outpatient Medications:   •  ALPRAZolam (XANAX) 0.5 MG tablet, Take 1 tablet by mouth At Night As Needed for Anxiety. for anxiety, Disp: 45 tablet, Rfl: 1  •  anastrozole (Arimidex) 1 MG tablet, Take 1 tablet by mouth Daily for 90 days., Disp: 30 tablet, Rfl: 3  •  buPROPion XL (WELLBUTRIN XL) 150 MG 24 hr tablet, Take 1 tablet by mouth Daily., Disp: 90 tablet, Rfl: 1  •  CHLORHEXIDINE GLUCONATE CLOTH EX, Apply  topically. USE AS DIRECTED, Disp: , Rfl:   •  FLUoxetine (PROzac) 40 MG capsule, Take 1 capsule by mouth Daily., Disp: 90 capsule, Rfl: 1  •  gabapentin (NEURONTIN) 300 MG capsule, Take 1 capsule by mouth Take As Directed. 1 capsule PO in AM  and 2 capsules PO at bedtime, Disp: 90 capsule, Rfl: 0  •  ibuprofen (ADVIL,MOTRIN) 800 MG tablet, Take 1 tablet by mouth 3 (Three) Times a Day As Needed for Headache. (Patient taking differently: Take 800 mg by mouth 3 (Three) Times a Day As Needed for Headache. HOLDING FOR SURGERY), Disp: 270 tablet, Rfl: 1  •  levothyroxine (SYNTHROID, LEVOTHROID) 25 MCG tablet, Take 1 tablet by mouth Daily., Disp: 90 tablet, Rfl: 1  •  lisinopril (PRINIVIL,ZESTRIL) 20 MG tablet, Take 1 tablet by mouth Daily., Disp: 90 tablet, Rfl: 1  •  omeprazole (PrilOSEC) 20 MG capsule, Take 1 capsule by mouth Daily., Disp: 90 capsule, Rfl: 1  •  oxyCODONE (Roxicodone) 5 MG immediate release tablet, Take 1 tablet by mouth Every 6 (Six) Hours As Needed for Moderate Pain  for up to 7 days., Disp: 28 tablet, Rfl: 0  •  propranolol LA (Inderal LA) 60 MG 24 hr capsule, Take 1 capsule by mouth Daily. (Patient taking differently: Take 60 mg by mouth Every Night.), Disp: 90 capsule, Rfl: 1  •  rizatriptan (MAXALT) 10 MG tablet, Take 1 tablet by mouth 1 (One) Time As Needed for Migraine for up to 1 dose. May repeat in 2 hours if needed, Disp: 12 tablet, Rfl: 2  •  simvastatin (ZOCOR) 20 MG tablet, Take 1 tablet by mouth Daily. (Patient taking differently: Take 20 mg by mouth Every Night.), Disp: 90 tablet, Rfl: 1    Allergies:  No Known Allergies    Family History:  her family history includes Alcohol abuse in her brother; Cancer in her father; Colon cancer in her maternal grandmother.    Social History:  she  reports that she has been smoking electronic cigarette and cigarettes. She started smoking about 40 years ago. She has been smoking about 1.00 pack per day. She has never used smokeless tobacco. She reports that she does not drink alcohol and does not use drugs.    Pertinent Findings on   Review of Systems   Constitutional: Negative for appetite change, chills, diaphoresis, fatigue, fever and unexpected weight change.   HENT:   Negative for  hearing loss, lump/mass, mouth sores, nosebleeds, sore throat, tinnitus, trouble swallowing and voice change.    Eyes: Negative for eye problems and icterus.   Respiratory: Positive for shortness of breath. Negative for chest tightness, cough, hemoptysis and wheezing.    Cardiovascular: Negative for chest pain, leg swelling and palpitations.   Gastrointestinal: Negative for abdominal distention, abdominal pain, blood in stool, constipation, diarrhea, nausea, rectal pain and vomiting.   Endocrine: Negative for hot flashes.   Genitourinary: Negative for bladder incontinence, difficulty urinating, dyspareunia, dysuria, frequency, hematuria, menstrual problem, nocturia, pelvic pain, vaginal bleeding and vaginal discharge.    Musculoskeletal: Negative for arthralgias, back pain, flank pain, gait problem, myalgias, neck pain and neck stiffness.   Skin: Negative for itching, rash and wound.   Neurological: Positive for headaches. Negative for dizziness, extremity weakness, gait problem, light-headedness, numbness, seizures and speech difficulty.   Hematological: Negative for adenopathy. Does not bruise/bleed easily.   Psychiatric/Behavioral: Negative for confusion, decreased concentration, depression, sleep disturbance and suicidal ideas. The patient is nervous/anxious.    :     Subjective   Vitals:    05/18/21 0828   BP: 94/68   Pulse: 74   Temp: 97.3 °F (36.3 °C)   TempSrc: Temporal   SpO2: 96%   Weight: 76.2 kg (168 lb)   PainSc: 0-No pain       Pertinent Findings on:  Physical Exam  Constitutional:       General: She is not in acute distress.     Appearance: Normal appearance. She is well-developed.   HENT:      Head: Normocephalic and atraumatic.      Nose: Nose normal.      Mouth/Throat:      Dentition: Normal dentition.   Eyes:      Conjunctiva/sclera: Conjunctivae normal.   Pulmonary:      Effort: Pulmonary effort is normal.   Musculoskeletal:         General: Normal range of motion.      Cervical back: Normal  range of motion.   Skin:     General: Skin is warm and dry.   Neurological:      Mental Status: She is alert and oriented to person, place, and time.   Psychiatric:         Behavior: Behavior normal. Behavior is cooperative.         Thought Content: Thought content normal.         Judgment: Judgment normal.            Assessment: Primary adenocarcinoma of upper lobe of left lung (CMS/HCC)    Plan: We reviewed today the details of the pathology and imaging studies up to this point - regarding the breasts and lung - and all questions were answered in that regard. There is a good understanding of the extent and stage of the disease in both cases. Regarding the issues with the anastrazole, I encouraged her to stay off of the medication for the rest of this week and restart it on Monday. She has no further UTI symptoms now, treating it only with azo.      We then discussed the treatment options for the lung cancer, specifically chemotherapy and radiation therapy and we discussed the specific recomendations given the extent of disease in this case. We discussed a course of treatment consisting of approximately 6600 cGy in 33 treatments to the above mentioned areas over a 6 1/2 week period of time.  We discussed the logistics of the daily treatment as well as our treatment planning process.      We then discussed the acute side effects, specifically mild irritation of the skin in the treatment area and the small likelihood of increased cough, sore throat, dysphagia and decreased appetite and fatigue.  We discussed the anticipated time frame for the resolution of the above-mentioned symptoms. We then discussed the small but possible long-term possibility of radiation pneumonitis, fibrosis and the possibility of being more short of air at the conclusion of the radiation therapy simply from those benign changes.      We again discussed the importance of our treatment planning process in limiting the volume of lung treated as  much as possible.  We discussed the imporance of a 4D CT for treatment planning given multiple studies that have shown that tumor motion during the breathing cycle needs to be evaluated and managed when highly conformal radiation techniques are used to treat lung cancer.  We discussed the recommended use of 4D CT scan planning coupled with IMRT in lung cancers as they are associated with improved overall survival and a decreased risk of > grade 3 pneumonitis compared to 3D conformal radiotherapy.  I explained why this patient would benefit and I believe all questions were answered.     We were able to complete that initial treatment planning 4D scan today. I will be fusing the most recent scans on to our treatment planning scan to insure adequate coverage of the involved area. I let her know the MRI of the brain was fine and will of course await the results of the MRI of the Tspine but anticipate those will be normal. Given that, I will be ready to begin treatments whenever Dr. Snell is ready. The patient returns there this Friday to finalize those plans.         Objective     My assessment above comes from my review of the imaging, pathology, physician notes and other pertinent information as mentioned.    I personally spent greater than 40 minutes today assessing, managing, discussing and documenting my visit with the patient. That time includes review of records, imaging and pathology reports, obtaining my own history, performing a medically appropriate evaluation, counseling and educating the patient, discussing goals, logistics, alternatives and risks of my recommendations, surveillance options and potential outcomes. It also includes the time documenting the clinical information in the EMR and communicating my recommendations to the other involved physicians.    .

## 2021-05-19 DIAGNOSIS — C34.12 PRIMARY ADENOCARCINOMA OF UPPER LOBE OF LEFT LUNG (HCC): Primary | ICD-10-CM

## 2021-05-19 DIAGNOSIS — F41.9 ANXIETY: ICD-10-CM

## 2021-05-19 PROBLEM — Z45.2 ENCOUNTER FOR FITTING AND ADJUSTMENT OF VASCULAR CATHETER: Status: ACTIVE | Noted: 2021-05-19

## 2021-05-19 RX ORDER — HEPARIN SODIUM (PORCINE) LOCK FLUSH IV SOLN 100 UNIT/ML 100 UNIT/ML
500 SOLUTION INTRAVENOUS AS NEEDED
Status: CANCELLED | OUTPATIENT
Start: 2021-05-19

## 2021-05-19 RX ORDER — FOLIC ACID 1 MG/1
1 TABLET ORAL DAILY
Qty: 30 TABLET | Refills: 3 | Status: SHIPPED | OUTPATIENT
Start: 2021-05-19 | End: 2021-11-19

## 2021-05-19 RX ORDER — ALPRAZOLAM 0.5 MG/1
0.5 TABLET ORAL 2 TIMES DAILY PRN
Qty: 45 TABLET | Refills: 1 | Status: SHIPPED | OUTPATIENT
Start: 2021-05-19 | End: 2021-07-20 | Stop reason: SDUPTHER

## 2021-05-19 RX ORDER — DEXAMETHASONE 4 MG/1
TABLET ORAL
Qty: 6 TABLET | Refills: 3 | Status: SHIPPED | OUTPATIENT
Start: 2021-05-19 | End: 2021-11-01

## 2021-05-19 RX ORDER — ONDANSETRON HYDROCHLORIDE 8 MG/1
8 TABLET, FILM COATED ORAL 3 TIMES DAILY PRN
Qty: 30 TABLET | Refills: 5 | Status: SHIPPED | OUTPATIENT
Start: 2021-05-19 | End: 2022-07-25

## 2021-05-19 RX ORDER — SODIUM CHLORIDE 0.9 % (FLUSH) 0.9 %
10 SYRINGE (ML) INJECTION AS NEEDED
Status: CANCELLED | OUTPATIENT
Start: 2021-05-19

## 2021-05-19 NOTE — TELEPHONE ENCOUNTER
Caller: Holli Patel    Relationship to patient: Self    Best call back number: [phone]    Patient is needing: PATIENT IS NEEDING TO SPEAK WITH SOMEONE ABOUT A PRESCRIPTION ALPRAZolam (XANAX) 0.5 MG tablet . PATIENT IS HAVING SOME ISSUES WITH THE DOSAGE AND THE PHARMACY IS TELLING THE PATIENT SHE CANNOT GET THE CORRECT AMOUNT . 45 PILLS INSTEAD OF 30. CAN SOMEONE PLEASE CALL PATIENT OR CALL THE PHARMACY TO SEE WHAT THE CONCERN IS AND INFORM THE PATIENT ON WHAT SHE SHOULD BE TAKING SHE IS UNSURE WHAT THE CORRECT AMOUNT SHOULD BE BC SHE THOUGHT IT WAS SUPPOSED TO BE CHANGED ???? PLEASE ADVISE.    PHARMACY:     [facility] [address] [facility] [address]  - [phone] [facility] [phone]   [phone]

## 2021-05-19 NOTE — PROGRESS NOTES
Spoke with Ms Patel by phone.  Reviewed with her that she will need a b12 shot prior to her starting treatment.  She states Dr Snell did tell her about that.  Informed her that we would put her on the schedule for the B12 injection on Friday when she is here to see Dr Snell.  Also pre-treatment home medications will be sent to her pharmacy. Plan to have her formal chemo education on Monday as well as start treatment.  She was seen by radiation yesterday and plans for port placement tomorrow.  Asked her to call me if she has any additional questions or concerns.

## 2021-05-20 ENCOUNTER — HOSPITAL ENCOUNTER (OUTPATIENT)
Dept: ULTRASOUND IMAGING | Facility: HOSPITAL | Age: 56
Discharge: HOME | End: 2021-05-20

## 2021-05-20 ENCOUNTER — HOSPITAL ENCOUNTER (OUTPATIENT)
Facility: HOSPITAL | Age: 56
Setting detail: HOSPITAL OUTPATIENT SURGERY
Discharge: HOME | End: 2021-05-20
Attending: THORACIC SURGERY (CARDIOTHORACIC VASCULAR SURGERY) | Admitting: THORACIC SURGERY (CARDIOTHORACIC VASCULAR SURGERY)

## 2021-05-20 ENCOUNTER — ANESTHESIA (OUTPATIENT)
Dept: PERIOP | Facility: HOSPITAL | Age: 56
End: 2021-05-20

## 2021-05-20 ENCOUNTER — ANESTHESIA EVENT (OUTPATIENT)
Dept: PERIOP | Facility: HOSPITAL | Age: 56
End: 2021-05-20

## 2021-05-20 ENCOUNTER — APPOINTMENT (OUTPATIENT)
Dept: GENERAL RADIOLOGY | Facility: HOSPITAL | Age: 56
End: 2021-05-20

## 2021-05-20 VITALS
BODY MASS INDEX: 30.51 KG/M2 | DIASTOLIC BLOOD PRESSURE: 84 MMHG | OXYGEN SATURATION: 93 % | RESPIRATION RATE: 16 BRPM | HEART RATE: 78 BPM | HEIGHT: 63 IN | SYSTOLIC BLOOD PRESSURE: 115 MMHG | TEMPERATURE: 97.6 F | WEIGHT: 172.2 LBS

## 2021-05-20 DIAGNOSIS — Z17.0 MALIGNANT NEOPLASM OF UPPER-OUTER QUADRANT OF RIGHT BREAST IN FEMALE, ESTROGEN RECEPTOR POSITIVE (HCC): ICD-10-CM

## 2021-05-20 DIAGNOSIS — C50.411 MALIGNANT NEOPLASM OF UPPER-OUTER QUADRANT OF RIGHT BREAST IN FEMALE, ESTROGEN RECEPTOR POSITIVE (HCC): ICD-10-CM

## 2021-05-20 DIAGNOSIS — C79.9 METASTATIC ADENOCARCINOMA (HCC): ICD-10-CM

## 2021-05-20 PROCEDURE — 71045 X-RAY EXAM CHEST 1 VIEW: CPT

## 2021-05-20 PROCEDURE — 25010000002 HEPARIN (PORCINE) PER 1000 UNITS: Performed by: THORACIC SURGERY (CARDIOTHORACIC VASCULAR SURGERY)

## 2021-05-20 PROCEDURE — 25010000002 ONDANSETRON PER 1 MG: Performed by: NURSE ANESTHETIST, CERTIFIED REGISTERED

## 2021-05-20 PROCEDURE — 77001 FLUOROGUIDE FOR VEIN DEVICE: CPT | Performed by: THORACIC SURGERY (CARDIOTHORACIC VASCULAR SURGERY)

## 2021-05-20 PROCEDURE — 76000 FLUOROSCOPY <1 HR PHYS/QHP: CPT

## 2021-05-20 PROCEDURE — 25010000002 MIDAZOLAM PER 1 MG: Performed by: ANESTHESIOLOGY

## 2021-05-20 PROCEDURE — 76882 US LMTD JT/FCL EVL NVASC XTR: CPT

## 2021-05-20 PROCEDURE — 36561 INSERT TUNNELED CV CATH: CPT | Performed by: THORACIC SURGERY (CARDIOTHORACIC VASCULAR SURGERY)

## 2021-05-20 PROCEDURE — 25010000002 PROPOFOL 10 MG/ML EMULSION: Performed by: NURSE ANESTHETIST, CERTIFIED REGISTERED

## 2021-05-20 PROCEDURE — 25010000002 FENTANYL CITRATE (PF) 50 MCG/ML SOLUTION: Performed by: ANESTHESIOLOGY

## 2021-05-20 PROCEDURE — 25010000003 LIDOCAINE 1 % SOLUTION: Performed by: THORACIC SURGERY (CARDIOTHORACIC VASCULAR SURGERY)

## 2021-05-20 PROCEDURE — C1788 PORT, INDWELLING, IMP: HCPCS | Performed by: THORACIC SURGERY (CARDIOTHORACIC VASCULAR SURGERY)

## 2021-05-20 PROCEDURE — 25010000003 CEFAZOLIN IN DEXTROSE 2-4 GM/100ML-% SOLUTION: Performed by: THORACIC SURGERY (CARDIOTHORACIC VASCULAR SURGERY)

## 2021-05-20 DEVICE — POWERPORT M.R.I. IMPLANTABLE PORT WITH ATTACHABLE 8F CHRONOFLEX OPEN-ENDED SINGLE-LUMEN VENOUS CATHETER INTERMEDIATE KIT (WITH SUTURE PLUGS)
Type: IMPLANTABLE DEVICE | Site: CHEST | Status: NON-FUNCTIONAL
Brand: POWERPORT M.R.I., CHRONOFLEX
Removed: 2021-11-29

## 2021-05-20 RX ORDER — SODIUM CHLORIDE 0.9 % (FLUSH) 0.9 %
3-10 SYRINGE (ML) INJECTION AS NEEDED
Status: DISCONTINUED | OUTPATIENT
Start: 2021-05-20 | End: 2021-05-20 | Stop reason: HOSPADM

## 2021-05-20 RX ORDER — CEFAZOLIN SODIUM 2 G/100ML
2 INJECTION, SOLUTION INTRAVENOUS ONCE
Status: COMPLETED | OUTPATIENT
Start: 2021-05-20 | End: 2021-05-20

## 2021-05-20 RX ORDER — CEPHALEXIN 250 MG/1
250 CAPSULE ORAL 3 TIMES DAILY
Qty: 15 CAPSULE | Refills: 0 | Status: SHIPPED | OUTPATIENT
Start: 2021-05-20 | End: 2021-05-25

## 2021-05-20 RX ORDER — NALOXONE HCL 0.4 MG/ML
0.2 VIAL (ML) INJECTION AS NEEDED
Status: DISCONTINUED | OUTPATIENT
Start: 2021-05-20 | End: 2021-05-20 | Stop reason: HOSPADM

## 2021-05-20 RX ORDER — EPHEDRINE SULFATE 50 MG/ML
5 INJECTION, SOLUTION INTRAVENOUS ONCE AS NEEDED
Status: DISCONTINUED | OUTPATIENT
Start: 2021-05-20 | End: 2021-05-20 | Stop reason: HOSPADM

## 2021-05-20 RX ORDER — SODIUM CHLORIDE 0.9 % (FLUSH) 0.9 %
3 SYRINGE (ML) INJECTION EVERY 12 HOURS SCHEDULED
Status: DISCONTINUED | OUTPATIENT
Start: 2021-05-20 | End: 2021-05-20 | Stop reason: HOSPADM

## 2021-05-20 RX ORDER — PROPOFOL 10 MG/ML
VIAL (ML) INTRAVENOUS CONTINUOUS PRN
Status: DISCONTINUED | OUTPATIENT
Start: 2021-05-20 | End: 2021-05-20 | Stop reason: SURG

## 2021-05-20 RX ORDER — LIDOCAINE HYDROCHLORIDE 20 MG/ML
INJECTION, SOLUTION INFILTRATION; PERINEURAL AS NEEDED
Status: DISCONTINUED | OUTPATIENT
Start: 2021-05-20 | End: 2021-05-20 | Stop reason: SURG

## 2021-05-20 RX ORDER — DIPHENHYDRAMINE HYDROCHLORIDE 50 MG/ML
12.5 INJECTION INTRAMUSCULAR; INTRAVENOUS
Status: DISCONTINUED | OUTPATIENT
Start: 2021-05-20 | End: 2021-05-20 | Stop reason: HOSPADM

## 2021-05-20 RX ORDER — HYDROCODONE BITARTRATE AND ACETAMINOPHEN 7.5; 325 MG/1; MG/1
1 TABLET ORAL ONCE AS NEEDED
Status: DISCONTINUED | OUTPATIENT
Start: 2021-05-20 | End: 2021-05-20 | Stop reason: HOSPADM

## 2021-05-20 RX ORDER — HYDRALAZINE HYDROCHLORIDE 20 MG/ML
5 INJECTION INTRAMUSCULAR; INTRAVENOUS
Status: DISCONTINUED | OUTPATIENT
Start: 2021-05-20 | End: 2021-05-20 | Stop reason: HOSPADM

## 2021-05-20 RX ORDER — ONDANSETRON 2 MG/ML
4 INJECTION INTRAMUSCULAR; INTRAVENOUS ONCE AS NEEDED
Status: DISCONTINUED | OUTPATIENT
Start: 2021-05-20 | End: 2021-05-20 | Stop reason: HOSPADM

## 2021-05-20 RX ORDER — LABETALOL HYDROCHLORIDE 5 MG/ML
5 INJECTION, SOLUTION INTRAVENOUS
Status: DISCONTINUED | OUTPATIENT
Start: 2021-05-20 | End: 2021-05-20 | Stop reason: HOSPADM

## 2021-05-20 RX ORDER — FENTANYL CITRATE 50 UG/ML
50 INJECTION, SOLUTION INTRAMUSCULAR; INTRAVENOUS
Status: DISCONTINUED | OUTPATIENT
Start: 2021-05-20 | End: 2021-05-20 | Stop reason: HOSPADM

## 2021-05-20 RX ORDER — HYDROMORPHONE HYDROCHLORIDE 1 MG/ML
0.5 INJECTION, SOLUTION INTRAMUSCULAR; INTRAVENOUS; SUBCUTANEOUS
Status: DISCONTINUED | OUTPATIENT
Start: 2021-05-20 | End: 2021-05-20 | Stop reason: HOSPADM

## 2021-05-20 RX ORDER — MORPHINE SULFATE 2 MG/ML
2 INJECTION, SOLUTION INTRAMUSCULAR; INTRAVENOUS EVERY 4 HOURS PRN
Status: CANCELLED | OUTPATIENT
Start: 2021-05-20 | End: 2021-05-27

## 2021-05-20 RX ORDER — LIDOCAINE HYDROCHLORIDE 10 MG/ML
0.5 INJECTION, SOLUTION EPIDURAL; INFILTRATION; INTRACAUDAL; PERINEURAL ONCE AS NEEDED
Status: DISCONTINUED | OUTPATIENT
Start: 2021-05-20 | End: 2021-05-20 | Stop reason: HOSPADM

## 2021-05-20 RX ORDER — NALOXONE HCL 0.4 MG/ML
0.4 VIAL (ML) INJECTION
Status: CANCELLED | OUTPATIENT
Start: 2021-05-20

## 2021-05-20 RX ORDER — OXYCODONE AND ACETAMINOPHEN 7.5; 325 MG/1; MG/1
1 TABLET ORAL ONCE AS NEEDED
Status: DISCONTINUED | OUTPATIENT
Start: 2021-05-20 | End: 2021-05-20 | Stop reason: HOSPADM

## 2021-05-20 RX ORDER — FAMOTIDINE 10 MG/ML
20 INJECTION, SOLUTION INTRAVENOUS ONCE
Status: COMPLETED | OUTPATIENT
Start: 2021-05-20 | End: 2021-05-20

## 2021-05-20 RX ORDER — PROMETHAZINE HYDROCHLORIDE 25 MG/1
25 SUPPOSITORY RECTAL ONCE AS NEEDED
Status: DISCONTINUED | OUTPATIENT
Start: 2021-05-20 | End: 2021-05-20 | Stop reason: HOSPADM

## 2021-05-20 RX ORDER — FLUMAZENIL 0.1 MG/ML
0.2 INJECTION INTRAVENOUS AS NEEDED
Status: DISCONTINUED | OUTPATIENT
Start: 2021-05-20 | End: 2021-05-20 | Stop reason: HOSPADM

## 2021-05-20 RX ORDER — PROMETHAZINE HYDROCHLORIDE 25 MG/1
25 TABLET ORAL ONCE AS NEEDED
Status: DISCONTINUED | OUTPATIENT
Start: 2021-05-20 | End: 2021-05-20 | Stop reason: HOSPADM

## 2021-05-20 RX ORDER — DIPHENHYDRAMINE HCL 25 MG
25 CAPSULE ORAL
Status: DISCONTINUED | OUTPATIENT
Start: 2021-05-20 | End: 2021-05-20 | Stop reason: HOSPADM

## 2021-05-20 RX ORDER — SODIUM CHLORIDE, SODIUM LACTATE, POTASSIUM CHLORIDE, CALCIUM CHLORIDE 600; 310; 30; 20 MG/100ML; MG/100ML; MG/100ML; MG/100ML
9 INJECTION, SOLUTION INTRAVENOUS CONTINUOUS
Status: DISCONTINUED | OUTPATIENT
Start: 2021-05-20 | End: 2021-05-20 | Stop reason: HOSPADM

## 2021-05-20 RX ORDER — LIDOCAINE HYDROCHLORIDE 10 MG/ML
INJECTION, SOLUTION INFILTRATION; PERINEURAL AS NEEDED
Status: DISCONTINUED | OUTPATIENT
Start: 2021-05-20 | End: 2021-05-20 | Stop reason: HOSPADM

## 2021-05-20 RX ORDER — ONDANSETRON 2 MG/ML
INJECTION INTRAMUSCULAR; INTRAVENOUS AS NEEDED
Status: DISCONTINUED | OUTPATIENT
Start: 2021-05-20 | End: 2021-05-20 | Stop reason: SURG

## 2021-05-20 RX ORDER — MIDAZOLAM HYDROCHLORIDE 1 MG/ML
1 INJECTION INTRAMUSCULAR; INTRAVENOUS
Status: DISCONTINUED | OUTPATIENT
Start: 2021-05-20 | End: 2021-05-20 | Stop reason: HOSPADM

## 2021-05-20 RX ADMIN — ONDANSETRON 4 MG: 2 INJECTION INTRAMUSCULAR; INTRAVENOUS at 13:29

## 2021-05-20 RX ADMIN — FENTANYL CITRATE 25 MCG: 50 INJECTION INTRAMUSCULAR; INTRAVENOUS at 13:44

## 2021-05-20 RX ADMIN — CEFAZOLIN SODIUM 2 G: 2 INJECTION, SOLUTION INTRAVENOUS at 13:17

## 2021-05-20 RX ADMIN — LIDOCAINE HYDROCHLORIDE 60 MG: 20 INJECTION, SOLUTION INFILTRATION; PERINEURAL at 13:29

## 2021-05-20 RX ADMIN — PROPOFOL 50 MG: 10 INJECTION, EMULSION INTRAVENOUS at 13:44

## 2021-05-20 RX ADMIN — SODIUM CHLORIDE, POTASSIUM CHLORIDE, SODIUM LACTATE AND CALCIUM CHLORIDE 9 ML/HR: 600; 310; 30; 20 INJECTION, SOLUTION INTRAVENOUS at 11:42

## 2021-05-20 RX ADMIN — PROPOFOL 50 MG: 10 INJECTION, EMULSION INTRAVENOUS at 13:29

## 2021-05-20 RX ADMIN — FAMOTIDINE 20 MG: 10 INJECTION INTRAVENOUS at 11:44

## 2021-05-20 RX ADMIN — MIDAZOLAM 1 MG: 1 INJECTION INTRAMUSCULAR; INTRAVENOUS at 11:47

## 2021-05-20 RX ADMIN — FENTANYL CITRATE 25 MCG: 50 INJECTION INTRAMUSCULAR; INTRAVENOUS at 13:29

## 2021-05-20 RX ADMIN — PROPOFOL 50 MCG/KG/MIN: 10 INJECTION, EMULSION INTRAVENOUS at 13:30

## 2021-05-20 NOTE — ANESTHESIA PREPROCEDURE EVALUATION
Anesthesia Evaluation     Patient summary reviewed and Nursing notes reviewed   history of anesthetic complications: PONV  NPO Solid Status: > 8 hours  NPO Liquid Status: > 8 hours           Airway   Mallampati: II  No difficulty expected  Dental    (+) poor dentition    Pulmonary     breath sounds clear to auscultation  (+) a smoker Current Smoked day of surgery, lung cancer, shortness of breath,   Cardiovascular     Rhythm: regular    (+) hypertension, hyperlipidemia,       Neuro/Psych  (+) headaches, psychiatric history Anxiety,     GI/Hepatic/Renal/Endo    (+)  GERD,      Musculoskeletal     Abdominal    Substance History      OB/GYN          Other   arthritis,    history of cancer active                    Anesthesia Plan    ASA 3     MAC     intravenous induction     Anesthetic plan, all risks, benefits, and alternatives have been provided, discussed and informed consent has been obtained with: patient.

## 2021-05-20 NOTE — ANESTHESIA POSTPROCEDURE EVALUATION
Patient: Holli Patel    Procedure Summary     Date: 05/20/21 Room / Location: [facility] OR 03 / [facility] MAIN OR    Anesthesia Start: 1323 Anesthesia Stop: 1421    Procedure: INSERTION VENOUS ACCESS DEVICE (Right ) Diagnosis:       Malignant neoplasm of upper-outer quadrant of right breast in female, estrogen receptor positive (CMS/HCC)      Metastatic adenocarcinoma (CMS/HCC)      (Malignant neoplasm of upper-outer quadrant of right breast in female, estrogen receptor positive (CMS/HCC) [C50.411, Z17.0])      (Metastatic adenocarcinoma (CMS/HCC) [C79.9])    Surgeons: Raphael Kerr III, MD Provider: Jim Dominguez MD    Anesthesia Type: MAC ASA Status: 3          Anesthesia Type: MAC    Vitals  Vitals Value Taken Time   /80 05/20/21 1418   Temp 36.4 °C (97.6 °F) 05/20/21 1418   Pulse 81 05/20/21 1418   Resp 16 05/20/21 1418   SpO2 94 % 05/20/21 1418           Post Anesthesia Care and Evaluation    Patient location during evaluation: bedside  Patient participation: complete - patient participated  Level of consciousness: awake  Pain management: adequate  Airway patency: patent  Anesthetic complications: No anesthetic complications    Cardiovascular status: acceptable  Respiratory status: acceptable  Hydration status: acceptable

## 2021-05-21 ENCOUNTER — LAB (OUTPATIENT)
Dept: LAB | Facility: HOSPITAL | Age: 56
End: 2021-05-21

## 2021-05-21 ENCOUNTER — OFFICE VISIT (OUTPATIENT)
Dept: ONCOLOGY | Facility: CLINIC | Age: 56
End: 2021-05-21

## 2021-05-21 ENCOUNTER — INFUSION (OUTPATIENT)
Dept: ONCOLOGY | Facility: HOSPITAL | Age: 56
End: 2021-05-21

## 2021-05-21 VITALS
TEMPERATURE: 97.8 F | SYSTOLIC BLOOD PRESSURE: 117 MMHG | OXYGEN SATURATION: 93 % | WEIGHT: 171.2 LBS | HEART RATE: 81 BPM | DIASTOLIC BLOOD PRESSURE: 78 MMHG | RESPIRATION RATE: 16 BRPM | HEIGHT: 63 IN | BODY MASS INDEX: 30.33 KG/M2

## 2021-05-21 DIAGNOSIS — C34.12 PRIMARY ADENOCARCINOMA OF UPPER LOBE OF LEFT LUNG (HCC): Primary | ICD-10-CM

## 2021-05-21 DIAGNOSIS — C79.9 METASTATIC ADENOCARCINOMA (HCC): ICD-10-CM

## 2021-05-21 DIAGNOSIS — C50.411 MALIGNANT NEOPLASM OF UPPER-OUTER QUADRANT OF RIGHT BREAST IN FEMALE, ESTROGEN RECEPTOR POSITIVE (HCC): ICD-10-CM

## 2021-05-21 DIAGNOSIS — Z17.0 MALIGNANT NEOPLASM OF UPPER-OUTER QUADRANT OF RIGHT BREAST IN FEMALE, ESTROGEN RECEPTOR POSITIVE (HCC): ICD-10-CM

## 2021-05-21 LAB
ALBUMIN SERPL-MCNC: 4.2 G/DL (ref 3.5–5.2)
ALBUMIN/GLOB SERPL: 1.2 G/DL (ref 1.1–2.4)
ALP SERPL-CCNC: 136 U/L (ref 38–116)
ALT SERPL W P-5'-P-CCNC: 11 U/L (ref 0–33)
ANION GAP SERPL CALCULATED.3IONS-SCNC: 9.5 MMOL/L (ref 5–15)
AST SERPL-CCNC: 12 U/L (ref 0–32)
BASOPHILS # BLD AUTO: 0.06 10*3/MM3 (ref 0–0.2)
BASOPHILS NFR BLD AUTO: 0.5 % (ref 0–1.5)
BILIRUB SERPL-MCNC: <0.2 MG/DL (ref 0.2–1.2)
BUN SERPL-MCNC: 13 MG/DL (ref 6–20)
BUN/CREAT SERPL: 14.1 (ref 7.3–30)
CALCIUM SPEC-SCNC: 9.6 MG/DL (ref 8.5–10.2)
CHLORIDE SERPL-SCNC: 99 MMOL/L (ref 98–107)
CO2 SERPL-SCNC: 28.5 MMOL/L (ref 22–29)
CREAT SERPL-MCNC: 0.92 MG/DL (ref 0.6–1.1)
DEPRECATED RDW RBC AUTO: 42.9 FL (ref 37–54)
EOSINOPHIL # BLD AUTO: 0.18 10*3/MM3 (ref 0–0.4)
EOSINOPHIL NFR BLD AUTO: 1.5 % (ref 0.3–6.2)
ERYTHROCYTE [DISTWIDTH] IN BLOOD BY AUTOMATED COUNT: 13.7 % (ref 12.3–15.4)
GFR SERPL CREATININE-BSD FRML MDRD: 63 ML/MIN/1.73
GLOBULIN UR ELPH-MCNC: 3.6 GM/DL (ref 1.8–3.5)
GLUCOSE SERPL-MCNC: 117 MG/DL (ref 74–124)
HCT VFR BLD AUTO: 40.9 % (ref 34–46.6)
HGB BLD-MCNC: 12.5 G/DL (ref 12–15.9)
IMM GRANULOCYTES # BLD AUTO: 0.04 10*3/MM3 (ref 0–0.05)
IMM GRANULOCYTES NFR BLD AUTO: 0.3 % (ref 0–0.5)
LYMPHOCYTES # BLD AUTO: 1.87 10*3/MM3 (ref 0.7–3.1)
LYMPHOCYTES NFR BLD AUTO: 15.6 % (ref 19.6–45.3)
MCH RBC QN AUTO: 26.3 PG (ref 26.6–33)
MCHC RBC AUTO-ENTMCNC: 30.6 G/DL (ref 31.5–35.7)
MCV RBC AUTO: 86.1 FL (ref 79–97)
MONOCYTES # BLD AUTO: 1.01 10*3/MM3 (ref 0.1–0.9)
MONOCYTES NFR BLD AUTO: 8.4 % (ref 5–12)
NEUTROPHILS NFR BLD AUTO: 73.7 % (ref 42.7–76)
NEUTROPHILS NFR BLD AUTO: 8.82 10*3/MM3 (ref 1.7–7)
NRBC BLD AUTO-RTO: 0 /100 WBC (ref 0–0.2)
PLATELET # BLD AUTO: 363 10*3/MM3 (ref 140–450)
PMV BLD AUTO: 9.5 FL (ref 6–12)
POTASSIUM SERPL-SCNC: 4.8 MMOL/L (ref 3.5–4.7)
PROT SERPL-MCNC: 7.8 G/DL (ref 6.3–8)
RBC # BLD AUTO: 4.75 10*6/MM3 (ref 3.77–5.28)
SODIUM SERPL-SCNC: 137 MMOL/L (ref 134–145)
WBC # BLD AUTO: 11.98 10*3/MM3 (ref 3.4–10.8)

## 2021-05-21 PROCEDURE — 99215 OFFICE O/P EST HI 40 MIN: CPT | Performed by: INTERNAL MEDICINE

## 2021-05-21 PROCEDURE — 80053 COMPREHEN METABOLIC PANEL: CPT

## 2021-05-21 PROCEDURE — 85025 COMPLETE CBC W/AUTO DIFF WBC: CPT

## 2021-05-21 PROCEDURE — 36415 COLL VENOUS BLD VENIPUNCTURE: CPT

## 2021-05-21 PROCEDURE — 25010000002 CYANOCOBALAMIN PER 1000 MCG: Performed by: INTERNAL MEDICINE

## 2021-05-21 PROCEDURE — 96372 THER/PROPH/DIAG INJ SC/IM: CPT

## 2021-05-21 RX ORDER — CYANOCOBALAMIN 1000 UG/ML
1000 INJECTION, SOLUTION INTRAMUSCULAR; SUBCUTANEOUS ONCE
Status: CANCELLED | OUTPATIENT
Start: 2021-05-21 | End: 2021-05-21

## 2021-05-21 RX ORDER — LETROZOLE 2.5 MG/1
2.5 TABLET, FILM COATED ORAL DAILY
Qty: 30 TABLET | Refills: 3 | Status: SHIPPED | OUTPATIENT
Start: 2021-05-21 | End: 2021-10-08

## 2021-05-21 RX ORDER — CYANOCOBALAMIN 1000 UG/ML
1000 INJECTION, SOLUTION INTRAMUSCULAR; SUBCUTANEOUS ONCE
Status: COMPLETED | OUTPATIENT
Start: 2021-05-21 | End: 2021-05-21

## 2021-05-21 RX ADMIN — CYANOCOBALAMIN 1000 MCG: 1000 INJECTION, SOLUTION INTRAMUSCULAR; SUBCUTANEOUS at 12:12

## 2021-05-21 NOTE — PROGRESS NOTES
Subjective   Holli Patel is a 55 y.o. female.  Referred by Dr. 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    3/1/2021-bilateral screening mammogram-microcalcifications seen in the posterior one third of the lateral aspect of the right breast.  Area of focal asymmetry seen in the middle one third of the upper inner quadrant of the left breast.    3/1/2021-DEXA scan-T score of -2.2 on the lumbar spine and T score of -2.3 in the left hip and T score of -1.9 in the right hip.  Findings consistent with osteopenia    3/23/2021-screening lung CT-there is a 10 x 11 mm solid nodule in the left upper lobe.  Enlarged AP window lymph node measuring 14 x 10 mm.  Suggestion of a possible 9 mm left hilar lymph node.  Heavy coronary artery calcification.    3/23/2021-diagnostic mammogram bilateral-cluster of microcalcifications in the middle third lateral aspect of the right breast.  Left breast demonstrates persistence of the area of focal asymmetry in the region.    Ultrasound-left breast ultrasound at 10 o'clock position, 6 cm from the nipple there is a 0.4 cm irregular hypoechoic lesion.  Stereotactic biopsy of the right breast calcifications recommended.  Ultrasound-guided biopsy of the left breast lesion recommended    4/7/2021-right breast ear tactic biopsy and left breast ultrasound-guided biopsy  Pathology  Right breast-invasive ductal carcinoma, grade 2, lymphovascular invasion present, ER +99% strong, ID +80% moderate, HER-2 negative, Ki-67 12%  Left breast upper inner quadrant 10 o'clock position biopsy, invasive ductal carcinoma, grade   2, ER +99% strong, ID +40% strong, HER-2 2+ on immunohistochemistry, nonamplified on FISH Ki-67 10%    4/14/2021-PET/CT-FDG avid aortopulmonary window lymph node measures 0.9 cm with an SUV of 7.5.  FDG avid left hilar lymph node  measures 1 cm with an SUV of 17.2.  Irregular soft tissue density in the right breast measuring 3.7 x 3.8 cm is favored to be secondary to the recent breast biopsy.  1.1 cm pulmonary nodule within the left upper lobe with SUV 9.7 .  Sub-6 mm pulmonary nodules are present in the right lower lobe.  No evidence of lymphadenopathy or metastatic disease in the abdomen.  Focal area of FDG uptake within the central canal posterior to T11-T12 displaced demonstrating an SUV of 5.5 thought to be reactive however MRI is recommended for further evaluation.    She was seen by Dr. Molina who initially planned for breast surgery however  due to the PET abnormalities she has been referred to Dr. Kerr who plans to do a wound on 4/30/2021.  Dr. Molina's and Dr. Kerr's notes reviewed.    Patient denies any family history of breast cancer.  Her mother had lymphoma.  Maternal grandmother had colon cancer.  Prior to that screening mammogram she did not have any palpable abnormalities of either breast.    She has been a heavy smoker for about 40 years and smoked 2 packs/day.  Denies any recent weight changes, new bone pains, cough, abdominal pain nausea vomiting constipation or diarrhea.  She does have anxiety and has been on several medications.  She has recently been started on Xanax to help with the mood and also with insomnia.    Patient had a video-assisted thoracoscopy and mediastinal lymphadenectomy on 4/30/2021.  L5-L6 lymph nodes were biopsied however the small pulmonary nodule in the left upper lobe was not difficult to find.  Pathology showed moderately differentiated adenocarcinoma which is CK7 and TTF-1 positive.  Consistent with lung primary.  Ki-67 85%.    5/14/2021-MRI of the brain-minimal chronic small vessel ischemic change in the white matter.  Otherwise negative MRI.    5/20/2021-bilateral axillary ultrasound-no evidence of axillary lymphadenopathy in either axilla.  Normal-appearing bilateral axillary lymph  nodes visualized.    Interval history  Ms. Patel presents to the clinic today accompanied by her .  She started anastrozole however she had significant issues with arthralgias and feeling poorly and she is requesting a change in medications.  She is here to review the results of the axillary ultrasound as well as the MRI of the brain and discuss chemotherapy recommendations.  She continues to experience dyspnea which is unchanged.  Other than that she does not have any new complaints.        The following portions of the patient's history were reviewed and updated as appropriate: allergies, current medications, past family history, past medical history, past social history, past surgical history and problem list.    Past Medical History:   Diagnosis Date   • Anxiety    • Dyspnea on exertion    • SHAYY (generalized anxiety disorder)     RELATED HIGH BLOOD PRESSURE   • GERD (gastroesophageal reflux disease)    • High blood pressure     ANXIETY RELATED   • Hyperlipidemia    • Hypothyroidism    • Insomnia    • Lung cancer (CMS/HCC)    • Lung nodule    • Malignant neoplasm of upper-outer quadrant of right breast in female, estrogen receptor positive (CMS/HCC) 4/13/2021   • Migraine    • PONV (postoperative nausea and vomiting)         Past Surgical History:   Procedure Laterality Date   • BREAST BIOPSY Bilateral 04/07/2021    Right Breast 10 o'clock & Left breast 10 o'clock 6 cm from nipple, [facility]   • CLOSED REDUCTION HIP DISLOCATION Left 4/30/2021    Procedure: BRONCHOSCOPY, LEFT VIDEO ASSITED THORACOSCOPY, ROBOTIC ASSSITED MEDIASTINAL LYMPHADENECTOMY WITH INTERCOSTAL NERVE BLOCKS;  Surgeon: Raphael Kerr III, MD;  Location: [facility];  Service: [facility];  Laterality: Left;   • COLONOSCOPY     • WRIST SURGERY Right         Family History   Problem Relation Age of Onset   • Cancer Father         LYMPHATIC   • Alcohol abuse Brother    • Colon cancer Maternal Grandmother    • Malig Hyperthermia Neg Hx      "    Social History     Socioeconomic History   • Marital status:      Spouse name: Not on file   • Number of children: Not on file   • Years of education: Not on file   • Highest education level: Not on file   Tobacco Use   • Smoking status: Current Every Day Smoker     Packs/day: 1.00     Types: Electronic Cigarette, Cigarettes     Start date: 1981   • Smokeless tobacco: Never Used   • Tobacco comment: ABT 15 CIGARETTES DAILY   Vaping Use   • Vaping Use: Never used   Substance and Sexual Activity   • Alcohol use: Never   • Drug use: Never   • Sexual activity: Yes        OB History        2    Para   2    Term   2            AB        Living           SAB        TAB        Ectopic        Molar        Multiple        Live Births                Age of menarche-10  Age at menopause-42  Oral contraceptive pill use-15 years  No HRT use  She has 2 children  Age at first live childbirth-17    No Known Allergies         Review of Systems   Review of systems as mentioned in the HPI      Objective   Height 160 cm (62.99\"), not currently breastfeeding.   Physical Exam  Vitals reviewed.   Constitutional:       Appearance: She is obese.   HENT:      Head: Normocephalic.      Right Ear: External ear normal.      Left Ear: External ear normal.      Nose: Nose normal.      Mouth/Throat:      Mouth: Mucous membranes are moist.      Pharynx: Oropharynx is clear.   Eyes:      Extraocular Movements: Extraocular movements intact.      Conjunctiva/sclera: Conjunctivae normal.      Pupils: Pupils are equal, round, and reactive to light.   Cardiovascular:      Rate and Rhythm: Normal rate and regular rhythm.      Pulses: Normal pulses.      Heart sounds: Normal heart sounds.   Pulmonary:      Effort: Pulmonary effort is normal.      Breath sounds: Normal breath sounds.   Abdominal:      General: Abdomen is flat. Bowel sounds are normal. There is no distension.      Palpations: There is no mass.   Genitourinary:    "  General: Normal vulva.   Musculoskeletal:         General: Normal range of motion.      Cervical back: Normal range of motion.   Skin:     General: Skin is warm.   Neurological:      General: No focal deficit present.      Mental Status: She is alert and oriented to person, place, and time.   Psychiatric:         Mood and Affect: Mood normal.         Behavior: Behavior normal.         Thought Content: Thought content normal.         Judgment: Judgment normal.       Breast Exam: Right breast with a large postbiopsy hematoma in the upper outer quadrant measuring 7 cm.  Visible ecchymosis.  No other palpable abnormalities left breast with no palpable abnormalities.  No right or left axillary lymphadenopathy    I have reexamined the patient and the results are consistent with the previously documented exam. Salma Snell MD     Lab on 05/21/2021   Component Date Value Ref Range Status   • WBC 05/21/2021 11.98* 3.40 - 10.80 10*3/mm3 Final   • RBC 05/21/2021 4.75  3.77 - 5.28 10*6/mm3 Final   • Hemoglobin 05/21/2021 12.5  12.0 - 15.9 g/dL Final   • Hematocrit 05/21/2021 40.9  34.0 - 46.6 % Final   • MCV 05/21/2021 86.1  79.0 - 97.0 fL Final   • MCH 05/21/2021 26.3* 26.6 - 33.0 pg Final   • MCHC 05/21/2021 30.6* 31.5 - 35.7 g/dL Final   • RDW 05/21/2021 13.7  12.3 - 15.4 % Final   • RDW-SD 05/21/2021 42.9  37.0 - 54.0 fl Final   • MPV 05/21/2021 9.5  6.0 - 12.0 fL Final   • Platelets 05/21/2021 363  140 - 450 10*3/mm3 Final   • Neutrophil % 05/21/2021 73.7  42.7 - 76.0 % Final   • Lymphocyte % 05/21/2021 15.6* 19.6 - 45.3 % Final   • Monocyte % 05/21/2021 8.4  5.0 - 12.0 % Final   • Eosinophil % 05/21/2021 1.5  0.3 - 6.2 % Final   • Basophil % 05/21/2021 0.5  0.0 - 1.5 % Final   • Immature Grans % 05/21/2021 0.3  0.0 - 0.5 % Final   • Neutrophils, Absolute 05/21/2021 8.82* 1.70 - 7.00 10*3/mm3 Final   • Lymphocytes, Absolute 05/21/2021 1.87  0.70 - 3.10 10*3/mm3 Final   • Monocytes, Absolute 05/21/2021 1.01* 0.10 -  0.90 10*3/mm3 Final   • Eosinophils, Absolute 05/21/2021 0.18  0.00 - 0.40 10*3/mm3 Final   • Basophils, Absolute 05/21/2021 0.06  0.00 - 0.20 10*3/mm3 Final   • Immature Grans, Absolute 05/21/2021 0.04  0.00 - 0.05 10*3/mm3 Final   • nRBC 05/21/2021 0.0  0.0 - 0.2 /100 WBC Final   Pre-Admission Testing on 05/18/2021   Component Date Value Ref Range Status   • Glucose 05/18/2021 100* 65 - 99 mg/dL Final   • BUN 05/18/2021 18  6 - 20 mg/dL Final   • Creatinine 05/18/2021 0.97  0.57 - 1.00 mg/dL Final   • Sodium 05/18/2021 136  136 - 145 mmol/L Final   • Potassium 05/18/2021 5.0  3.5 - 5.2 mmol/L Final   • Chloride 05/18/2021 97* 98 - 107 mmol/L Final   • CO2 05/18/2021 29.0  22.0 - 29.0 mmol/L Final   • Calcium 05/18/2021 9.6  8.6 - 10.5 mg/dL Final   • eGFR Non African Amer 05/18/2021 59* >60 mL/min/1.73 Final   • BUN/Creatinine Ratio 05/18/2021 18.6  7.0 - 25.0 Final   • Anion Gap 05/18/2021 10.0  5.0 - 15.0 mmol/L Final   • Protime 05/18/2021 12.7  11.7 - 14.2 Seconds Final   • INR 05/18/2021 0.97  0.90 - 1.10 Final   • COVID19 05/18/2021 Not Detected  Not Detected - Ref. Range Final   Lab on 05/11/2021   Component Date Value Ref Range Status   • Glucose 05/11/2021 119  74 - 124 mg/dL Final   • BUN 05/11/2021 16  6 - 20 mg/dL Final   • Creatinine 05/11/2021 0.88  0.60 - 1.10 mg/dL Final   • Sodium 05/11/2021 140  134 - 145 mmol/L Final   • Potassium 05/11/2021 4.9* 3.5 - 4.7 mmol/L Final   • Chloride 05/11/2021 100  98 - 107 mmol/L Final   • CO2 05/11/2021 29.1* 22.0 - 29.0 mmol/L Final   • Calcium 05/11/2021 9.8  8.5 - 10.2 mg/dL Final   • Total Protein 05/11/2021 7.7  6.3 - 8.0 g/dL Final   • Albumin 05/11/2021 4.30  3.50 - 5.20 g/dL Final   • ALT (SGPT) 05/11/2021 16  0 - 33 U/L Final   • AST (SGOT) 05/11/2021 14  0 - 32 U/L Final   • Alkaline Phosphatase 05/11/2021 133* 38 - 116 U/L Final   • Total Bilirubin 05/11/2021 0.2  0.2 - 1.2 mg/dL Final   • eGFR Non  Amer 05/11/2021 66  >60 mL/min/1.73 Final    • Globulin 05/11/2021 3.4  1.8 - 3.5 gm/dL Final   • A/G Ratio 05/11/2021 1.3  1.1 - 2.4 g/dL Final   • BUN/Creatinine Ratio 05/11/2021 18.2  7.3 - 30.0 Final   • Anion Gap 05/11/2021 10.9  5.0 - 15.0 mmol/L Final   • WBC 05/11/2021 11.54* 3.40 - 10.80 10*3/mm3 Final   • RBC 05/11/2021 4.93  3.77 - 5.28 10*6/mm3 Final   • Hemoglobin 05/11/2021 13.1  12.0 - 15.9 g/dL Final   • Hematocrit 05/11/2021 42.3  34.0 - 46.6 % Final   • MCV 05/11/2021 85.8  79.0 - 97.0 fL Final   • MCH 05/11/2021 26.6  26.6 - 33.0 pg Final   • MCHC 05/11/2021 31.0* 31.5 - 35.7 g/dL Final   • RDW 05/11/2021 14.0  12.3 - 15.4 % Final   • RDW-SD 05/11/2021 43.8  37.0 - 54.0 fl Final   • MPV 05/11/2021 9.3  6.0 - 12.0 fL Final   • Platelets 05/11/2021 435  140 - 450 10*3/mm3 Final   • Neutrophil % 05/11/2021 65.9  42.7 - 76.0 % Final   • Lymphocyte % 05/11/2021 23.7  19.6 - 45.3 % Final   • Monocyte % 05/11/2021 6.3  5.0 - 12.0 % Final   • Eosinophil % 05/11/2021 2.5  0.3 - 6.2 % Final   • Basophil % 05/11/2021 0.9  0.0 - 1.5 % Final   • Immature Grans % 05/11/2021 0.7* 0.0 - 0.5 % Final   • Neutrophils, Absolute 05/11/2021 7.60* 1.70 - 7.00 10*3/mm3 Final   • Lymphocytes, Absolute 05/11/2021 2.74  0.70 - 3.10 10*3/mm3 Final   • Monocytes, Absolute 05/11/2021 0.73  0.10 - 0.90 10*3/mm3 Final   • Eosinophils, Absolute 05/11/2021 0.29  0.00 - 0.40 10*3/mm3 Final   • Basophils, Absolute 05/11/2021 0.10  0.00 - 0.20 10*3/mm3 Final   • Immature Grans, Absolute 05/11/2021 0.08* 0.00 - 0.05 10*3/mm3 Final   • nRBC 05/11/2021 0.0  0.0 - 0.2 /100 WBC Final   Admission on 04/30/2021, Discharged on 05/01/2021   Component Date Value Ref Range Status   • Addendum 3 04/30/2021    Final                    Value:This result contains rich text formatting which cannot be displayed here.   • Addendum 2 04/30/2021    Final                    Value:This result contains rich text formatting which cannot be displayed here.   • Addendum 04/30/2021    Final                     Value:This result contains rich text formatting which cannot be displayed here.   • Case Report 04/30/2021    Final                    Value:Surgical Pathology Report                         Case: [id number]                                  Authorizing Provider:  Raphael Kerr III, MD  Collected:           04/30/2021 10:53 AM          Ordering Location:     [facility]  Received:            04/30/2021 10:59 AM                                 MAIN OR                                                                      Pathologist:           Gurvinder Hernandez MD                                                         Specimens:   1) - Lymph Node, L6 LYMPH NODE FOR FROZEN - OR 8 - CALL [phone] WITH RESULTS                           2) - Lymph Node, L5 LYMPH NODE                                                            • Final Diagnosis 04/30/2021    Final                    Value:This result contains rich text formatting which cannot be displayed here.   • Comment 04/30/2021    Final                    Value:This result contains rich text formatting which cannot be displayed here.   • Intraoperative Consultation 04/30/2021    Final                    Value:This result contains rich text formatting which cannot be displayed here.   • Gross Description 04/30/2021    Final                    Value:This result contains rich text formatting which cannot be displayed here.   • Special Stains 04/30/2021    Final                    Value:This result contains rich text formatting which cannot be displayed here.   • Glucose 05/01/2021 115* 65 - 99 mg/dL Final   • BUN 05/01/2021 16  6 - 20 mg/dL Final   • Creatinine 05/01/2021 0.85  0.57 - 1.00 mg/dL Final   • Sodium 05/01/2021 137  136 - 145 mmol/L Final   • Potassium 05/01/2021 4.5  3.5 - 5.2 mmol/L Final   • Chloride 05/01/2021 106  98 - 107 mmol/L Final   • CO2 05/01/2021 21.6* 22.0 - 29.0 mmol/L Final   • Calcium 05/01/2021 8.7  8.6 - 10.5 mg/dL Final    • eGFR Non  Amer 05/01/2021 69  >60 mL/min/1.73 Final   • BUN/Creatinine Ratio 05/01/2021 18.8  7.0 - 25.0 Final   • Anion Gap 05/01/2021 9.4  5.0 - 15.0 mmol/L Final   • WBC 05/01/2021 13.43* 3.40 - 10.80 10*3/mm3 Final   • RBC 05/01/2021 4.39  3.77 - 5.28 10*6/mm3 Final   • Hemoglobin 05/01/2021 11.9* 12.0 - 15.9 g/dL Final   • Hematocrit 05/01/2021 38.3  34.0 - 46.6 % Final   • MCV 05/01/2021 87.2  79.0 - 97.0 fL Final   • MCH 05/01/2021 27.1  26.6 - 33.0 pg Final   • MCHC 05/01/2021 31.1* 31.5 - 35.7 g/dL Final   • RDW 05/01/2021 13.8  12.3 - 15.4 % Final   • RDW-SD 05/01/2021 44.2  37.0 - 54.0 fl Final   • MPV 05/01/2021 9.8  6.0 - 12.0 fL Final   • Platelets 05/01/2021 300  140 - 450 10*3/mm3 Final   • Neutrophil % 05/01/2021 76.5* 42.7 - 76.0 % Final   • Lymphocyte % 05/01/2021 13.2* 19.6 - 45.3 % Final   • Monocyte % 05/01/2021 9.6  5.0 - 12.0 % Final   • Eosinophil % 05/01/2021 0.1* 0.3 - 6.2 % Final   • Basophil % 05/01/2021 0.2  0.0 - 1.5 % Final   • Immature Grans % 05/01/2021 0.4  0.0 - 0.5 % Final   • Neutrophils, Absolute 05/01/2021 10.28* 1.70 - 7.00 10*3/mm3 Final   • Lymphocytes, Absolute 05/01/2021 1.77  0.70 - 3.10 10*3/mm3 Final   • Monocytes, Absolute 05/01/2021 1.29* 0.10 - 0.90 10*3/mm3 Final   • Eosinophils, Absolute 05/01/2021 0.01  0.00 - 0.40 10*3/mm3 Final   • Basophils, Absolute 05/01/2021 0.03  0.00 - 0.20 10*3/mm3 Final   • Immature Grans, Absolute 05/01/2021 0.05  0.00 - 0.05 10*3/mm3 Final   • nRBC 05/01/2021 0.0  0.0 - 0.2 /100 WBC Final   Lab on 04/28/2021   Component Date Value Ref Range Status   • SARS-CoV-2 PCR 04/28/2021 Not Detected  Not Detected Final    Nucleic acid specific to SARS-CoV-2 (COVID-19) virus was not detected in  this sample by the TaqPath (TM) COVID-19 Combo Kit.          SARS-CoV-2 (COVID-19) nucleic acid testing performed using [employer] Aptima (R) SARS-CoV-2 Assay or [employer] TaqPath (TM)  COVID-19 Combo Kit as indicated above under  Emergency Use Authorization (EUA) from the FDA. Aptima (R) and TaqPath (TM) test performance  characteristics verified by [facility] in accordance with the FDAs Guidance Document (Policy for Diagnostic Tests for Coronavirus Disease-2019  during the Public Health Emergency) issued on March 16, 2020. The laboratory is regulated under CLIA as qualified to perform high-complexity testing  Unless otherwise noted, all testing was performed at [facility], CLIA No. [id number], [place] State Licensee No. [id number]   Lab on 04/28/2021   Component Date Value Ref Range Status   • WBC 04/28/2021 9.84  3.40 - 10.80 10*3/mm3 Final   • RBC 04/28/2021 4.86  3.77 - 5.28 10*6/mm3 Final   • Hemoglobin 04/28/2021 13.6  12.0 - 15.9 g/dL Final   • Hematocrit 04/28/2021 40.4  34.0 - 46.6 % Final   • MCV 04/28/2021 83.1  79.0 - 97.0 fL Final   • MCH 04/28/2021 28.0  26.6 - 33.0 pg Final   • MCHC 04/28/2021 33.7  31.5 - 35.7 g/dL Final   • RDW 04/28/2021 14.3  12.3 - 15.4 % Final   • RDW-SD 04/28/2021 43.5  37.0 - 54.0 fl Final   • MPV 04/28/2021 9.5  6.0 - 12.0 fL Final   • Platelets 04/28/2021 275  140 - 450 10*3/mm3 Final   • Neutrophil % 04/28/2021 65.3  42.7 - 76.0 % Final   • Lymphocyte % 04/28/2021 24.0  19.6 - 45.3 % Final   • Monocyte % 04/28/2021 7.3  5.0 - 12.0 % Final   • Eosinophil % 04/28/2021 2.3  0.3 - 6.2 % Final   • Basophil % 04/28/2021 0.7  0.0 - 1.5 % Final   • Immature Grans % 04/28/2021 0.4  0.0 - 0.5 % Final   • Neutrophils, Absolute 04/28/2021 6.42  1.70 - 7.00 10*3/mm3 Final   • Lymphocytes, Absolute 04/28/2021 2.36  0.70 - 3.10 10*3/mm3 Final   • Monocytes, Absolute 04/28/2021 0.72  0.10 - 0.90 10*3/mm3 Final   • Eosinophils, Absolute 04/28/2021 0.23  0.00 - 0.40 10*3/mm3 Final   • Basophils, Absolute 04/28/2021 0.07  0.00 - 0.20 10*3/mm3 Final   • Immature Grans, Absolute 04/28/2021 0.04  0.00 - 0.05 10*3/mm3 Final   • nRBC 04/28/2021 0.0  0.0 - 0.2 /100 WBC Final   Pre-Admission Testing on 04/26/2021   Component  Date Value Ref Range Status   • QT Interval 04/26/2021 400  ms Final   • Glucose 04/26/2021 101* 65 - 99 mg/dL Final   • BUN 04/26/2021 17  6 - 20 mg/dL Final   • Creatinine 04/26/2021 0.79  0.57 - 1.00 mg/dL Final   • Sodium 04/26/2021 140  136 - 145 mmol/L Final   • Potassium 04/26/2021 4.4  3.5 - 5.2 mmol/L Final   • Chloride 04/26/2021 103  98 - 107 mmol/L Final   • CO2 04/26/2021 27.2  22.0 - 29.0 mmol/L Final   • Calcium 04/26/2021 9.5  8.6 - 10.5 mg/dL Final   • eGFR Non African Amer 04/26/2021 75  >60 mL/min/1.73 Final   • BUN/Creatinine Ratio 04/26/2021 21.5  7.0 - 25.0 Final   • Anion Gap 04/26/2021 9.8  5.0 - 15.0 mmol/L Final   • Protime 04/26/2021 12.2  11.7 - 14.2 Seconds Final   • INR 04/26/2021 0.92  0.90 - 1.10 Final   • ABO Type 04/26/2021 O   Final   • RH type 04/26/2021 Positive   Final   • Antibody Screen 04/26/2021 Negative   Final   • T&S Expiration Date 04/26/2021 5/10/2021 11:59:00 PM   Final   • WBC 04/26/2021 8.53  3.40 - 10.80 10*3/mm3 Final   • RBC 04/26/2021 4.85  3.77 - 5.28 10*6/mm3 Final   • Hemoglobin 04/26/2021 12.9  12.0 - 15.9 g/dL Final   • Hematocrit 04/26/2021 40.0  34.0 - 46.6 % Final   • MCV 04/26/2021 82.5  79.0 - 97.0 fL Final   • MCH 04/26/2021 26.6  26.6 - 33.0 pg Final   • MCHC 04/26/2021 32.3  31.5 - 35.7 g/dL Final   • RDW 04/26/2021 13.9  12.3 - 15.4 % Final   • RDW-SD 04/26/2021 41.2  37.0 - 54.0 fl Final   • MPV 04/26/2021 9.8  6.0 - 12.0 fL Final   • Platelets 04/26/2021 310  140 - 450 10*3/mm3 Final   • Neutrophil % 04/26/2021 58.3  42.7 - 76.0 % Final   • Lymphocyte % 04/26/2021 27.1  19.6 - 45.3 % Final   • Monocyte % 04/26/2021 7.7  5.0 - 12.0 % Final   • Eosinophil % 04/26/2021 5.3  0.3 - 6.2 % Final   • Basophil % 04/26/2021 1.2  0.0 - 1.5 % Final   • Immature Grans % 04/26/2021 0.4  0.0 - 0.5 % Final   • Neutrophils, Absolute 04/26/2021 4.98  1.70 - 7.00 10*3/mm3 Final   • Lymphocytes, Absolute 04/26/2021 2.31  0.70 - 3.10 10*3/mm3 Final   • Monocytes,  Absolute 04/26/2021 0.66  0.10 - 0.90 10*3/mm3 Final   • Eosinophils, Absolute 04/26/2021 0.45* 0.00 - 0.40 10*3/mm3 Final   • Basophils, Absolute 04/26/2021 0.10  0.00 - 0.20 10*3/mm3 Final   • Immature Grans, Absolute 04/26/2021 0.03  0.00 - 0.05 10*3/mm3 Final   • nRBC 04/26/2021 0.0  0.0 - 0.2 /100 WBC Final        XR Chest 1 View    Result Date: 5/20/2021  New right Mediport catheter as described.  This report was finalized on 5/20/2021 4:05 PM by Dr. Jelani Santacruz M.D.      XR Chest 1 View    Result Date: 5/1/2021  Electronically signed by Cecil Timmons M.D. on 05-01-21 at 0714    US Axilla Bilateral    Result Date: 5/20/2021  There is no evidence for axillary adenopathy in either axilla. Normal-appearing bilateral axillary lymph nodes are visualized.  This report was finalized on 5/20/2021 2:13 PM by Dr. Jeffrey Quevedo M.D.       4/28/2021  CBC-WBC 9.84, hemoglobin 13.4, platelets 275    MRI of the brain 5/14/2021-images independently reviewed and interpreted by me in detail summarized in HPI  Bilateral axillary ultrasound 5/20/2021 images independently reviewed and interpreted by me in detail summarized in HPI    5/21/2021  CBC-leukocytosis with a WBC count of 11.98, hemoglobin normal at 12.5, platelets 363  CMP reviewed and within normal limits except for mild increase in alkaline phosphatase at 136, potassium 4.8    Assessment/Plan      *Bilateral breast cancer  · Invasive ductal carcinoma in both right and left breast and lesions measure under 1 cm.  No palpable lymphadenopathy although unsure if this has been evaluated by an axillary ultrasound.  No abnormal axillary lymphadenopathy noted on PET.  · Most likely clinical T1b N0 MX, both cancers were noted to be grade 2, ER/VT positive and HER-2 negative and Ki-67 less than 15%  VATS on 4/30/2021  Biopsy confirms adenocarcinoma of pulmonary primary  Discussed with Dr. Molina about delaying breast surgery and she is in agreement  We discussed starting  neoadjuvant endocrine therapy with anastrozole 1 mg p.o. daily  · Patient experienced significant adverse effects with anastrozole  · Treatment changed to letrozole today.  · Prescription sent to pharmacy  · Axillary ultrasound 4/20/2021 - for any axillary lymphadenopathy    *Adenocarcinoma of the left lung  · Most likely stage T1 N2 M0, stage III  · MRI of the thoracic spine ordered to further evaluate the abnormality seen on PET  · MRI of the brain negative  · Follow-up on MRI of the thoracic spine   · Given that she is only 55 and fairly good performance status I discussed with her by with concurrent chemoradiation with cisplatin and Alimta every 3 weeks followed by durvalumab.  · Adverse effects including but not limited to myelosuppression, increased risk of infections, nausea, vomiting, renal dysfunction, ototoxicity, neurotoxicity have been discussed at length.  · She has been recommended to start folic acid and she will be administered B12 injection today.  · Plan to start chemoradiation after obtaining the MRI of the thoracic spine    *Bilateral breast cancer-no family history of breast cancer but given synchronous breast cancer genetic testing has been sent.  Genetic testing with a variant of unknown significance.    *Anxiety-she is on several different medications currently.  Referral has been made to supportive oncology clinic today.  She is requesting an increase in the dose of the Xanax.  Okay to increase the dose to 1 mg nightly to help with sleep.  Further management of these medications will be deferred to supportive oncology once she has established care with them.  He has been started on gabapentin.  Continue follow-up with supportive oncology    *Left-sided chest wall pain-secondary to VATS.  Most likely neuropathic.  Continue gabapentin and Norco as needed.    *Tobacco use-she plans on quitting once she starts    *Hypertension-continue lisinopril.  Blood pressure 117/78    *Follow-up-2  weeks    43 minutes spent on the encounter including documentation of the same day

## 2021-05-22 ENCOUNTER — HOSPITAL ENCOUNTER (OUTPATIENT)
Dept: MRI IMAGING | Facility: HOSPITAL | Age: 56
Discharge: HOME | End: 2021-05-22
Admitting: INTERNAL MEDICINE

## 2021-05-22 DIAGNOSIS — C50.411 MALIGNANT NEOPLASM OF UPPER-OUTER QUADRANT OF RIGHT BREAST IN FEMALE, ESTROGEN RECEPTOR POSITIVE (HCC): ICD-10-CM

## 2021-05-22 DIAGNOSIS — Z17.0 MALIGNANT NEOPLASM OF UPPER-OUTER QUADRANT OF RIGHT BREAST IN FEMALE, ESTROGEN RECEPTOR POSITIVE (HCC): ICD-10-CM

## 2021-05-22 PROCEDURE — A9577 INJ MULTIHANCE: HCPCS | Performed by: INTERNAL MEDICINE

## 2021-05-22 PROCEDURE — 0 GADOBENATE DIMEGLUMINE 529 MG/ML SOLUTION: Performed by: INTERNAL MEDICINE

## 2021-05-22 PROCEDURE — 72157 MRI CHEST SPINE W/O & W/DYE: CPT

## 2021-05-22 RX ADMIN — GADOBENATE DIMEGLUMINE 16 ML: 529 INJECTION, SOLUTION INTRAVENOUS at 08:08

## 2021-05-24 ENCOUNTER — APPOINTMENT (OUTPATIENT)
Dept: LAB | Facility: HOSPITAL | Age: 56
End: 2021-05-24

## 2021-05-24 ENCOUNTER — OFFICE VISIT (OUTPATIENT)
Dept: ONCOLOGY | Facility: CLINIC | Age: 56
End: 2021-05-24

## 2021-05-24 VITALS — WEIGHT: 173.2 LBS | BODY MASS INDEX: 30.69 KG/M2

## 2021-05-24 DIAGNOSIS — C34.12 PRIMARY ADENOCARCINOMA OF UPPER LOBE OF LEFT LUNG (HCC): Primary | ICD-10-CM

## 2021-05-24 PROCEDURE — 77293 RESPIRATOR MOTION MGMT SIMUL: CPT | Performed by: RADIOLOGY

## 2021-05-24 PROCEDURE — 77300 RADIATION THERAPY DOSE PLAN: CPT | Performed by: RADIOLOGY

## 2021-05-24 PROCEDURE — 77301 RADIOTHERAPY DOSE PLAN IMRT: CPT | Performed by: RADIOLOGY

## 2021-05-24 PROCEDURE — 77338 DESIGN MLC DEVICE FOR IMRT: CPT | Performed by: RADIOLOGY

## 2021-05-24 PROCEDURE — 99215 OFFICE O/P EST HI 40 MIN: CPT | Performed by: NURSE PRACTITIONER

## 2021-05-24 RX ORDER — PALONOSETRON 0.05 MG/ML
0.25 INJECTION, SOLUTION INTRAVENOUS ONCE
Status: CANCELLED | OUTPATIENT
Start: 2021-05-25

## 2021-05-24 RX ORDER — SODIUM CHLORIDE 9 MG/ML
250 INJECTION, SOLUTION INTRAVENOUS ONCE
Status: CANCELLED | OUTPATIENT
Start: 2021-05-25

## 2021-05-24 NOTE — PROGRESS NOTES
____________________PATIENT EDUCATION____________________    PATIENT EDUCATION:  Today I met with the patient and  to discuss the chemotherapy regimen recommended for treatment of his/her disease.  The patient was given explanation of treatment premed side effects including office policy that prohibits patients to drive if sedating medications are administered, MD explanation given regarding benefits, side effects, toxicities and goals of treatment.  The patient received a Chemotherapy/Biotherapy Plan Summary including diagnosis and explanation of specific treatment plan.    SIDE EFFECTS:  Common side effects were discussed with the patient and/or significant other.  Discussion included where applicable hair loss/discoloration, anemia/fatigue, infection/chills/fever, appetite, bleeding risk/precautions, constipation, diarrhea, mouth sores, taste alteration, loss of appetite,nausea/vomiting, peripheral neuropathy, skin/nail changes, rash, muscle aches/weakness, photosensitivity, weight gain/loss, hearing loss, dizziness, menopausal symptoms, menstrual irregularity, sterility, high blood pressure, heart damage, liver damage, lung damage, kidney damage, DVT/PE risk, fluid retention, pleural/pericardial effusion, somnolence, electrolyte/LFT imbalance, vein exercises and/or the possible need for vascular access/port placement.  The patient was advised that although uncommon, leakage of an infused medication from the vein or venous access device (port) may lead to skin breakdown and/or other tissue damage.  The patient was advised that he/she may have pain, bleeding, and/or bruising from the insertion of a needle in their vein or venous access device (port).  The patient was further advised that, in spite of proper technique, infection with redness and irritation may rarely occur at the site where the needle was inserted.  The patient was advised that if complications occur, additional medical treatment is  available.    Discussion also included side effects specific to drugs in the treatment plan, specifically Cisplatin, Alimta, and Durvalumab.     Reproductive risks were discussed, including appropriate use of birth control and protection during sexual relations.    Survivorship referral placed if appropriate YES     A total of 45  minutes were spent with the patient, with 100% of time spent in education and counseling.

## 2021-05-24 NOTE — PROGRESS NOTES
[facility] Hematology/Oncology Treatment Plan Summary    Name: Holli Patel  [facility]# [id number]  MD:      Diagnosis: Lung Cancer Stage: stage III      Goal of chemotherapy: adjuvant    Treatment Medication(s):   1. Chemoradiation with Cisplatin and Alimta every 3 weeks x 3 cycles followed by Durvalumab to complete one year of therapy    Frequency: As above     Number of cycles: As above     Starting on: 5/25/21    Items for home use: Senokot-S (for constipation), Anderson of Magnesia (for constipation), Immodium AD (for diarrhea), Tylenol (for fever and/or pain) and Thermometer    Rx written for: [x] Nausea    [x] Pre-Chemo   ondansetron 4 mg by mouth every 8 hours as needed for nausea, dexamethasone 4 mg by mouth twice daily on the day before, day of, and day after treatment and folic acid 1 gram daily by mouth      Completing Provider: KORI Bills           Date/time: 05/24/2021      Please note: You will be seen by a provider frequently with your treatment plan. This plan may change depending on many factors, if so, this will be discussed with you by your physician.  Last update 12/2020.

## 2021-05-25 ENCOUNTER — DOCUMENTATION (OUTPATIENT)
Dept: OTHER | Facility: HOSPITAL | Age: 56
End: 2021-05-25

## 2021-05-25 ENCOUNTER — OFFICE VISIT (OUTPATIENT)
Dept: ONCOLOGY | Facility: CLINIC | Age: 56
End: 2021-05-25

## 2021-05-25 ENCOUNTER — TELEPHONE (OUTPATIENT)
Dept: ONCOLOGY | Facility: CLINIC | Age: 56
End: 2021-05-25

## 2021-05-25 ENCOUNTER — APPOINTMENT (OUTPATIENT)
Dept: MRI IMAGING | Facility: HOSPITAL | Age: 56
End: 2021-05-25

## 2021-05-25 ENCOUNTER — INFUSION (OUTPATIENT)
Dept: ONCOLOGY | Facility: HOSPITAL | Age: 56
End: 2021-05-25

## 2021-05-25 VITALS
HEART RATE: 82 BPM | OXYGEN SATURATION: 95 % | RESPIRATION RATE: 16 BRPM | TEMPERATURE: 96.9 F | BODY MASS INDEX: 31.47 KG/M2 | DIASTOLIC BLOOD PRESSURE: 82 MMHG | SYSTOLIC BLOOD PRESSURE: 132 MMHG | WEIGHT: 177.6 LBS

## 2021-05-25 VITALS — WEIGHT: 173 LBS | BODY MASS INDEX: 30.65 KG/M2 | HEIGHT: 63 IN

## 2021-05-25 VITALS
DIASTOLIC BLOOD PRESSURE: 82 MMHG | HEART RATE: 82 BPM | BODY MASS INDEX: 30.78 KG/M2 | OXYGEN SATURATION: 95 % | TEMPERATURE: 96.9 F | WEIGHT: 173.72 LBS | HEIGHT: 63 IN | RESPIRATION RATE: 16 BRPM | SYSTOLIC BLOOD PRESSURE: 132 MMHG

## 2021-05-25 DIAGNOSIS — C34.12 PRIMARY ADENOCARCINOMA OF UPPER LOBE OF LEFT LUNG (HCC): Primary | ICD-10-CM

## 2021-05-25 DIAGNOSIS — C50.411 MALIGNANT NEOPLASM OF UPPER-OUTER QUADRANT OF RIGHT BREAST IN FEMALE, ESTROGEN RECEPTOR POSITIVE (HCC): Primary | ICD-10-CM

## 2021-05-25 DIAGNOSIS — Z17.0 MALIGNANT NEOPLASM OF UPPER-OUTER QUADRANT OF RIGHT BREAST IN FEMALE, ESTROGEN RECEPTOR POSITIVE (HCC): Primary | ICD-10-CM

## 2021-05-25 LAB
ALBUMIN SERPL-MCNC: 4.2 G/DL (ref 3.5–5.2)
ALBUMIN/GLOB SERPL: 1.3 G/DL (ref 1.1–2.4)
ALP SERPL-CCNC: 132 U/L (ref 38–116)
ALT SERPL W P-5'-P-CCNC: 14 U/L (ref 0–33)
ANION GAP SERPL CALCULATED.3IONS-SCNC: 10.9 MMOL/L (ref 5–15)
AST SERPL-CCNC: 12 U/L (ref 0–32)
BASOPHILS # BLD AUTO: 0.02 10*3/MM3 (ref 0–0.2)
BASOPHILS NFR BLD AUTO: 0.2 % (ref 0–1.5)
BILIRUB SERPL-MCNC: <0.2 MG/DL (ref 0.2–1.2)
BUN SERPL-MCNC: 14 MG/DL (ref 6–20)
BUN/CREAT SERPL: 18.4 (ref 7.3–30)
CALCIUM SPEC-SCNC: 9.9 MG/DL (ref 8.5–10.2)
CHLORIDE SERPL-SCNC: 101 MMOL/L (ref 98–107)
CO2 SERPL-SCNC: 27.1 MMOL/L (ref 22–29)
CREAT SERPL-MCNC: 0.76 MG/DL (ref 0.6–1.1)
DEPRECATED RDW RBC AUTO: 42.2 FL (ref 37–54)
EOSINOPHIL # BLD AUTO: 0.01 10*3/MM3 (ref 0–0.4)
EOSINOPHIL NFR BLD AUTO: 0.1 % (ref 0.3–6.2)
ERYTHROCYTE [DISTWIDTH] IN BLOOD BY AUTOMATED COUNT: 13.6 % (ref 12.3–15.4)
GFR SERPL CREATININE-BSD FRML MDRD: 78 ML/MIN/1.73
GLOBULIN UR ELPH-MCNC: 3.2 GM/DL (ref 1.8–3.5)
GLUCOSE SERPL-MCNC: 125 MG/DL (ref 74–124)
HCT VFR BLD AUTO: 39.2 % (ref 34–46.6)
HGB BLD-MCNC: 12.3 G/DL (ref 12–15.9)
IMM GRANULOCYTES # BLD AUTO: 0.1 10*3/MM3 (ref 0–0.05)
IMM GRANULOCYTES NFR BLD AUTO: 0.8 % (ref 0–0.5)
LAB AP CASE REPORT: NORMAL
LAB AP DIAGNOSIS COMMENT: NORMAL
LAB AP SPECIAL STAINS: NORMAL
LYMPHOCYTES # BLD AUTO: 1.59 10*3/MM3 (ref 0.7–3.1)
LYMPHOCYTES NFR BLD AUTO: 11.9 % (ref 19.6–45.3)
Lab: NORMAL
MAGNESIUM SERPL-MCNC: 1.7 MG/DL (ref 1.8–2.5)
MCH RBC QN AUTO: 26.6 PG (ref 26.6–33)
MCHC RBC AUTO-ENTMCNC: 31.4 G/DL (ref 31.5–35.7)
MCV RBC AUTO: 84.8 FL (ref 79–97)
MONOCYTES # BLD AUTO: 0.57 10*3/MM3 (ref 0.1–0.9)
MONOCYTES NFR BLD AUTO: 4.3 % (ref 5–12)
NEUTROPHILS NFR BLD AUTO: 11.04 10*3/MM3 (ref 1.7–7)
NEUTROPHILS NFR BLD AUTO: 82.7 % (ref 42.7–76)
NRBC BLD AUTO-RTO: 0 /100 WBC (ref 0–0.2)
PATH REPORT.ADDENDUM SPEC: NORMAL
PATH REPORT.FINAL DX SPEC: NORMAL
PATH REPORT.GROSS SPEC: NORMAL
PLATELET # BLD AUTO: 384 10*3/MM3 (ref 140–450)
PMV BLD AUTO: 9.5 FL (ref 6–12)
POTASSIUM SERPL-SCNC: 4.5 MMOL/L (ref 3.5–4.7)
PROT SERPL-MCNC: 7.4 G/DL (ref 6.3–8)
RBC # BLD AUTO: 4.62 10*6/MM3 (ref 3.77–5.28)
SODIUM SERPL-SCNC: 139 MMOL/L (ref 134–145)
WBC # BLD AUTO: 13.33 10*3/MM3 (ref 3.4–10.8)

## 2021-05-25 PROCEDURE — 77386: CPT | Performed by: RADIOLOGY

## 2021-05-25 PROCEDURE — 96411 CHEMO IV PUSH ADDL DRUG: CPT

## 2021-05-25 PROCEDURE — 96415 CHEMO IV INFUSION ADDL HR: CPT

## 2021-05-25 PROCEDURE — 25010000002 DEXAMETHASONE SODIUM PHOSPHATE 100 MG/10ML SOLUTION: Performed by: INTERNAL MEDICINE

## 2021-05-25 PROCEDURE — 25010000002 PEMETREXED PER 10 MG: Performed by: INTERNAL MEDICINE

## 2021-05-25 PROCEDURE — 85025 COMPLETE CBC W/AUTO DIFF WBC: CPT

## 2021-05-25 PROCEDURE — 96375 TX/PRO/DX INJ NEW DRUG ADDON: CPT

## 2021-05-25 PROCEDURE — 96413 CHEMO IV INFUSION 1 HR: CPT

## 2021-05-25 PROCEDURE — 25010000002 CISPLATIN PER 50 MG: Performed by: INTERNAL MEDICINE

## 2021-05-25 PROCEDURE — 83735 ASSAY OF MAGNESIUM: CPT

## 2021-05-25 PROCEDURE — 25010000002 FOSAPREPITANT PER 1 MG: Performed by: INTERNAL MEDICINE

## 2021-05-25 PROCEDURE — 25010000002 POTASSIUM CHLORIDE PER 2 MEQ OF POTASSIUM: Performed by: INTERNAL MEDICINE

## 2021-05-25 PROCEDURE — 99214 OFFICE O/P EST MOD 30 MIN: CPT | Performed by: INTERNAL MEDICINE

## 2021-05-25 PROCEDURE — 80053 COMPREHEN METABOLIC PANEL: CPT

## 2021-05-25 PROCEDURE — 77386 CHG INTENSITY MODULATED RADIATION TX DLVR COMPLEX: CPT | Performed by: RADIOLOGY

## 2021-05-25 PROCEDURE — 96366 THER/PROPH/DIAG IV INF ADDON: CPT

## 2021-05-25 PROCEDURE — 25010000002 MAGNESIUM SULFATE PER 500 MG OF MAGNESIUM: Performed by: INTERNAL MEDICINE

## 2021-05-25 PROCEDURE — 25010000002 PALONOSETRON PER 25 MCG: Performed by: INTERNAL MEDICINE

## 2021-05-25 PROCEDURE — 77427 RADIATION TX MANAGEMENT X5: CPT | Performed by: RADIOLOGY

## 2021-05-25 PROCEDURE — 77014 CHG CT GUIDANCE RADIATION THERAPY FLDS PLACEMENT: CPT | Performed by: RADIOLOGY

## 2021-05-25 PROCEDURE — 96367 TX/PROPH/DG ADDL SEQ IV INF: CPT

## 2021-05-25 RX ORDER — PALONOSETRON 0.05 MG/ML
0.25 INJECTION, SOLUTION INTRAVENOUS ONCE
Status: COMPLETED | OUTPATIENT
Start: 2021-05-25 | End: 2021-05-25

## 2021-05-25 RX ORDER — SODIUM CHLORIDE 9 MG/ML
250 INJECTION, SOLUTION INTRAVENOUS ONCE
Status: COMPLETED | OUTPATIENT
Start: 2021-05-25 | End: 2021-05-25

## 2021-05-25 RX ADMIN — PALONOSETRON 0.25 MG: 0.05 INJECTION, SOLUTION INTRAVENOUS at 11:13

## 2021-05-25 RX ADMIN — POTASSIUM CHLORIDE 1000 ML: 2 INJECTION, SOLUTION, CONCENTRATE INTRAVENOUS at 09:16

## 2021-05-25 RX ADMIN — CISPLATIN 136 MG: 1 INJECTION, SOLUTION INTRAVENOUS at 12:50

## 2021-05-25 RX ADMIN — SODIUM CHLORIDE 100 ML: 9 INJECTION, SOLUTION INTRAVENOUS at 11:35

## 2021-05-25 RX ADMIN — SODIUM CHLORIDE 910 MG: 9 INJECTION, SOLUTION INTRAVENOUS at 12:08

## 2021-05-25 RX ADMIN — DEXAMETHASONE SODIUM PHOSPHATE 12 MG: 10 INJECTION, SOLUTION INTRAMUSCULAR; INTRAVENOUS at 11:13

## 2021-05-25 RX ADMIN — POTASSIUM CHLORIDE 1000 ML: 2 INJECTION, SOLUTION, CONCENTRATE INTRAVENOUS at 16:14

## 2021-05-25 RX ADMIN — SODIUM CHLORIDE 250 ML: 9 INJECTION, SOLUTION INTRAVENOUS at 09:16

## 2021-05-25 NOTE — NURSING NOTE
Pt completed treatment without complaints.  Pt had 3900 urine output throughout the day, along with 1 unmeasured urine while at radiation.  Pt denies any exacerbation of sob. Pt had approximate weight gain of 3.5 lbs.  Pt lungs were clear bilaterally. Pt encouraged to urinate with any urges and if she has any signs of fluid overload to contact office or seek medical attention. Pt v/u

## 2021-05-25 NOTE — TELEPHONE ENCOUNTER
----- Message from Brooklynn Powell RN sent at 5/25/2021  1:41 PM EDT -----  I'm just reviewing her schedule- she is not due for treatment next week, it will just be for IV fluids (ok to keep on Wednesday since it's just fluids). She will need to be scheduled again for treatment on 06/15 for cycle 2 cisplatin/alimta and MD.    Thank you!

## 2021-05-25 NOTE — PROGRESS NOTES
Subjective   Holli Patel is a 55 y.o. female.  Referred by Dr. 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    3/1/2021-bilateral screening mammogram-microcalcifications seen in the posterior one third of the lateral aspect of the right breast.  Area of focal asymmetry seen in the middle one third of the upper inner quadrant of the left breast.    3/1/2021-DEXA scan-T score of -2.2 on the lumbar spine and T score of -2.3 in the left hip and T score of -1.9 in the right hip.  Findings consistent with osteopenia    3/23/2021-screening lung CT-there is a 10 x 11 mm solid nodule in the left upper lobe.  Enlarged AP window lymph node measuring 14 x 10 mm.  Suggestion of a possible 9 mm left hilar lymph node.  Heavy coronary artery calcification.    3/23/2021-diagnostic mammogram bilateral-cluster of microcalcifications in the middle third lateral aspect of the right breast.  Left breast demonstrates persistence of the area of focal asymmetry in the region.    Ultrasound-left breast ultrasound at 10 o'clock position, 6 cm from the nipple there is a 0.4 cm irregular hypoechoic lesion.  Stereotactic biopsy of the right breast calcifications recommended.  Ultrasound-guided biopsy of the left breast lesion recommended    4/7/2021-right breast ear tactic biopsy and left breast ultrasound-guided biopsy  Pathology  Right breast-invasive ductal carcinoma, grade 2, lymphovascular invasion present, ER +99% strong, SD +80% moderate, HER-2 negative, Ki-67 12%  Left breast upper inner quadrant 10 o'clock position biopsy, invasive ductal carcinoma, grade   2, ER +99% strong, SD +40% strong, HER-2 2+ on immunohistochemistry, nonamplified on FISH Ki-67 10%    4/14/2021-PET/CT-FDG avid aortopulmonary window lymph node measures 0.9 cm with an SUV of 7.5.  FDG avid left hilar lymph node  measures 1 cm with an SUV of 17.2.  Irregular soft tissue density in the right breast measuring 3.7 x 3.8 cm is favored to be secondary to the recent breast biopsy.  1.1 cm pulmonary nodule within the left upper lobe with SUV 9.7 .  Sub-6 mm pulmonary nodules are present in the right lower lobe.  No evidence of lymphadenopathy or metastatic disease in the abdomen.  Focal area of FDG uptake within the central canal posterior to T11-T12 displaced demonstrating an SUV of 5.5 thought to be reactive however MRI is recommended for further evaluation.    She was seen by Dr. Molina who initially planned for breast surgery however  due to the PET abnormalities she has been referred to Dr. Kerr who plans to do a wound on 4/30/2021.  Dr. Molina's and Dr. Kerr's notes reviewed.    Patient denies any family history of breast cancer.  Her mother had lymphoma.  Maternal grandmother had colon cancer.  Prior to that screening mammogram she did not have any palpable abnormalities of either breast.    She has been a heavy smoker for about 40 years and smoked 2 packs/day.  Denies any recent weight changes, new bone pains, cough, abdominal pain nausea vomiting constipation or diarrhea.  She does have anxiety and has been on several medications.  She has recently been started on Xanax to help with the mood and also with insomnia.    Patient had a video-assisted thoracoscopy and mediastinal lymphadenectomy on 4/30/2021.  L5-L6 lymph nodes were biopsied however the small pulmonary nodule in the left upper lobe was not difficult to find.  Pathology showed moderately differentiated adenocarcinoma which is CK7 and TTF-1 positive.  Consistent with lung primary.  Ki-67 85%.    5/14/2021-MRI of the brain-minimal chronic small vessel ischemic change in the white matter.  Otherwise negative MRI.    5/20/2021-bilateral axillary ultrasound-no evidence of axillary lymphadenopathy in either axilla.  Normal-appearing bilateral axillary lymph  nodes visualized.    Interval history  Ms. Amador is scheduled for cycle 1 of cisplatin Alimta today.  She had an MRI of the thoracic spine, and being seen today to review the results of the MRI of the thoracic spine.  She denies any new complaints at this time.      The following portions of the patient's history were reviewed and updated as appropriate: allergies, current medications, past family history, past medical history, past social history, past surgical history and problem list.    Past Medical History:   Diagnosis Date   • Anxiety    • Dyspnea on exertion    • SHAYY (generalized anxiety disorder)     RELATED HIGH BLOOD PRESSURE   • GERD (gastroesophageal reflux disease)    • High blood pressure     ANXIETY RELATED   • Hyperlipidemia    • Hypothyroidism    • Insomnia    • Lung cancer (CMS/HCC)    • Lung nodule    • Malignant neoplasm of upper-outer quadrant of right breast in female, estrogen receptor positive (CMS/HCC) 4/13/2021   • Migraine    • PONV (postoperative nausea and vomiting)         Past Surgical History:   Procedure Laterality Date   • BREAST BIOPSY Bilateral 04/07/2021    Right Breast 10 o'clock & Left breast 10 o'clock 6 cm from nipple, [facility]   • CLOSED REDUCTION HIP DISLOCATION Left 4/30/2021    Procedure: BRONCHOSCOPY, LEFT VIDEO ASSITED THORACOSCOPY, ROBOTIC ASSSITED MEDIASTINAL LYMPHADENECTOMY WITH INTERCOSTAL NERVE BLOCKS;  Surgeon: Raphael Kerr III, MD;  Location: [facility] OR;  Service: DaVinci;  Laterality: Left;   • COLONOSCOPY     • VENOUS ACCESS DEVICE (PORT) INSERTION Right 5/20/2021    Procedure: INSERTION VENOUS ACCESS DEVICE;  Surgeon: Raphael Kerr III, MD;  Location: [facility] OR;  Service: Thoracic;  Laterality: Right;   • WRIST SURGERY Right         Family History   Problem Relation Age of Onset   • Cancer Father         LYMPHATIC   • Alcohol abuse Brother    • Colon cancer Maternal Grandmother    • Malig Hyperthermia Neg Hx         Social History  "    Socioeconomic History   • Marital status:      Spouse name: Not on file   • Number of children: Not on file   • Years of education: Not on file   • Highest education level: Not on file   Tobacco Use   • Smoking status: Current Every Day Smoker     Packs/day: 1.00     Types: Electronic Cigarette, Cigarettes     Start date: 1981   • Smokeless tobacco: Never Used   • Tobacco comment: ABT 15 CIGARETTES DAILY   Vaping Use   • Vaping Use: Never used   Substance and Sexual Activity   • Alcohol use: Never   • Drug use: Never   • Sexual activity: Yes        OB History        2    Para   2    Term   2            AB        Living           SAB        TAB        Ectopic        Molar        Multiple        Live Births                Age of menarche-10  Age at menopause-42  Oral contraceptive pill use-15 years  No HRT use  She has 2 children  Age at first live childbirth-17    No Known Allergies         Review of Systems   Review of systems as mentioned in the HPI      Objective   Blood pressure 132/82, pulse 82, temperature 96.9 °F (36.1 °C), temperature source Temporal, resp. rate 16, height 160 cm (62.99\"), weight 78.8 kg (173 lb 11.6 oz), SpO2 95 %, not currently breastfeeding.   Physical Exam  Vitals reviewed.   Constitutional:       Appearance: She is obese.   HENT:      Head: Normocephalic.      Right Ear: External ear normal.      Left Ear: External ear normal.      Nose: Nose normal.      Mouth/Throat:      Mouth: Mucous membranes are moist.      Pharynx: Oropharynx is clear.   Eyes:      Extraocular Movements: Extraocular movements intact.      Conjunctiva/sclera: Conjunctivae normal.      Pupils: Pupils are equal, round, and reactive to light.   Cardiovascular:      Rate and Rhythm: Normal rate and regular rhythm.      Pulses: Normal pulses.      Heart sounds: Normal heart sounds.   Pulmonary:      Effort: Pulmonary effort is normal.      Breath sounds: Normal breath sounds.   Abdominal: "      General: Abdomen is flat. Bowel sounds are normal. There is no distension.      Palpations: There is no mass.   Genitourinary:     General: Normal vulva.   Musculoskeletal:         General: Normal range of motion.      Cervical back: Normal range of motion.   Skin:     General: Skin is warm.   Neurological:      General: No focal deficit present.      Mental Status: She is alert and oriented to person, place, and time.   Psychiatric:         Mood and Affect: Mood normal.         Behavior: Behavior normal.         Thought Content: Thought content normal.         Judgment: Judgment normal.       Breast Exam: Right breast with a large postbiopsy hematoma in the upper outer quadrant measuring 7 cm.  Visible ecchymosis.  No other palpable abnormalities left breast with no palpable abnormalities.  No right or left axillary lymphadenopathy    I have reexamined the patient and the results are consistent with the previously documented exam. Salma Snell MD     Infusion on 05/25/2021   Component Date Value Ref Range Status   • Glucose 05/25/2021 125* 74 - 124 mg/dL Final   • BUN 05/25/2021 14  6 - 20 mg/dL Final   • Creatinine 05/25/2021 0.76  0.60 - 1.10 mg/dL Final   • Sodium 05/25/2021 139  134 - 145 mmol/L Final   • Potassium 05/25/2021 4.5  3.5 - 4.7 mmol/L Final   • Chloride 05/25/2021 101  98 - 107 mmol/L Final   • CO2 05/25/2021 27.1  22.0 - 29.0 mmol/L Final   • Calcium 05/25/2021 9.9  8.5 - 10.2 mg/dL Final   • Total Protein 05/25/2021 7.4  6.3 - 8.0 g/dL Final   • Albumin 05/25/2021 4.20  3.50 - 5.20 g/dL Final   • ALT (SGPT) 05/25/2021 14  0 - 33 U/L Final   • AST (SGOT) 05/25/2021 12  0 - 32 U/L Final   • Alkaline Phosphatase 05/25/2021 132* 38 - 116 U/L Final   • Total Bilirubin 05/25/2021 <0.2* 0.2 - 1.2 mg/dL Final   • eGFR Non African Amer 05/25/2021 78  >60 mL/min/1.73 Final   • Globulin 05/25/2021 3.2  1.8 - 3.5 gm/dL Final   • A/G Ratio 05/25/2021 1.3  1.1 - 2.4 g/dL Final   • BUN/Creatinine Ratio  05/25/2021 18.4  7.3 - 30.0 Final   • Anion Gap 05/25/2021 10.9  5.0 - 15.0 mmol/L Final   • Magnesium 05/25/2021 1.7* 1.8 - 2.5 mg/dL Final   • WBC 05/25/2021 13.33* 3.40 - 10.80 10*3/mm3 Final   • RBC 05/25/2021 4.62  3.77 - 5.28 10*6/mm3 Final   • Hemoglobin 05/25/2021 12.3  12.0 - 15.9 g/dL Final   • Hematocrit 05/25/2021 39.2  34.0 - 46.6 % Final   • MCV 05/25/2021 84.8  79.0 - 97.0 fL Final   • MCH 05/25/2021 26.6  26.6 - 33.0 pg Final   • MCHC 05/25/2021 31.4* 31.5 - 35.7 g/dL Final   • RDW 05/25/2021 13.6  12.3 - 15.4 % Final   • RDW-SD 05/25/2021 42.2  37.0 - 54.0 fl Final   • MPV 05/25/2021 9.5  6.0 - 12.0 fL Final   • Platelets 05/25/2021 384  140 - 450 10*3/mm3 Final   • Neutrophil % 05/25/2021 82.7* 42.7 - 76.0 % Final   • Lymphocyte % 05/25/2021 11.9* 19.6 - 45.3 % Final   • Monocyte % 05/25/2021 4.3* 5.0 - 12.0 % Final   • Eosinophil % 05/25/2021 0.1* 0.3 - 6.2 % Final   • Basophil % 05/25/2021 0.2  0.0 - 1.5 % Final   • Immature Grans % 05/25/2021 0.8* 0.0 - 0.5 % Final   • Neutrophils, Absolute 05/25/2021 11.04* 1.70 - 7.00 10*3/mm3 Final   • Lymphocytes, Absolute 05/25/2021 1.59  0.70 - 3.10 10*3/mm3 Final   • Monocytes, Absolute 05/25/2021 0.57  0.10 - 0.90 10*3/mm3 Final   • Eosinophils, Absolute 05/25/2021 0.01  0.00 - 0.40 10*3/mm3 Final   • Basophils, Absolute 05/25/2021 0.02  0.00 - 0.20 10*3/mm3 Final   • Immature Grans, Absolute 05/25/2021 0.10* 0.00 - 0.05 10*3/mm3 Final   • nRBC 05/25/2021 0.0  0.0 - 0.2 /100 WBC Final   Lab on 05/21/2021   Component Date Value Ref Range Status   • Glucose 05/21/2021 117  74 - 124 mg/dL Final   • BUN 05/21/2021 13  6 - 20 mg/dL Final   • Creatinine 05/21/2021 0.92  0.60 - 1.10 mg/dL Final   • Sodium 05/21/2021 137  134 - 145 mmol/L Final   • Potassium 05/21/2021 4.8* 3.5 - 4.7 mmol/L Final   • Chloride 05/21/2021 99  98 - 107 mmol/L Final   • CO2 05/21/2021 28.5  22.0 - 29.0 mmol/L Final   • Calcium 05/21/2021 9.6  8.5 - 10.2 mg/dL Final   • Total Protein  05/21/2021 7.8  6.3 - 8.0 g/dL Final   • Albumin 05/21/2021 4.20  3.50 - 5.20 g/dL Final   • ALT (SGPT) 05/21/2021 11  0 - 33 U/L Final   • AST (SGOT) 05/21/2021 12  0 - 32 U/L Final   • Alkaline Phosphatase 05/21/2021 136* 38 - 116 U/L Final   • Total Bilirubin 05/21/2021 <0.2* 0.2 - 1.2 mg/dL Final   • eGFR Non  Amer 05/21/2021 63  >60 mL/min/1.73 Final   • Globulin 05/21/2021 3.6* 1.8 - 3.5 gm/dL Final   • A/G Ratio 05/21/2021 1.2  1.1 - 2.4 g/dL Final   • BUN/Creatinine Ratio 05/21/2021 14.1  7.3 - 30.0 Final   • Anion Gap 05/21/2021 9.5  5.0 - 15.0 mmol/L Final   • WBC 05/21/2021 11.98* 3.40 - 10.80 10*3/mm3 Final   • RBC 05/21/2021 4.75  3.77 - 5.28 10*6/mm3 Final   • Hemoglobin 05/21/2021 12.5  12.0 - 15.9 g/dL Final   • Hematocrit 05/21/2021 40.9  34.0 - 46.6 % Final   • MCV 05/21/2021 86.1  79.0 - 97.0 fL Final   • MCH 05/21/2021 26.3* 26.6 - 33.0 pg Final   • MCHC 05/21/2021 30.6* 31.5 - 35.7 g/dL Final   • RDW 05/21/2021 13.7  12.3 - 15.4 % Final   • RDW-SD 05/21/2021 42.9  37.0 - 54.0 fl Final   • MPV 05/21/2021 9.5  6.0 - 12.0 fL Final   • Platelets 05/21/2021 363  140 - 450 10*3/mm3 Final   • Neutrophil % 05/21/2021 73.7  42.7 - 76.0 % Final   • Lymphocyte % 05/21/2021 15.6* 19.6 - 45.3 % Final   • Monocyte % 05/21/2021 8.4  5.0 - 12.0 % Final   • Eosinophil % 05/21/2021 1.5  0.3 - 6.2 % Final   • Basophil % 05/21/2021 0.5  0.0 - 1.5 % Final   • Immature Grans % 05/21/2021 0.3  0.0 - 0.5 % Final   • Neutrophils, Absolute 05/21/2021 8.82* 1.70 - 7.00 10*3/mm3 Final   • Lymphocytes, Absolute 05/21/2021 1.87  0.70 - 3.10 10*3/mm3 Final   • Monocytes, Absolute 05/21/2021 1.01* 0.10 - 0.90 10*3/mm3 Final   • Eosinophils, Absolute 05/21/2021 0.18  0.00 - 0.40 10*3/mm3 Final   • Basophils, Absolute 05/21/2021 0.06  0.00 - 0.20 10*3/mm3 Final   • Immature Grans, Absolute 05/21/2021 0.04  0.00 - 0.05 10*3/mm3 Final   • nRBC 05/21/2021 0.0  0.0 - 0.2 /100 WBC Final   Pre-Admission Testing on 05/18/2021    Component Date Value Ref Range Status   • Glucose 05/18/2021 100* 65 - 99 mg/dL Final   • BUN 05/18/2021 18  6 - 20 mg/dL Final   • Creatinine 05/18/2021 0.97  0.57 - 1.00 mg/dL Final   • Sodium 05/18/2021 136  136 - 145 mmol/L Final   • Potassium 05/18/2021 5.0  3.5 - 5.2 mmol/L Final   • Chloride 05/18/2021 97* 98 - 107 mmol/L Final   • CO2 05/18/2021 29.0  22.0 - 29.0 mmol/L Final   • Calcium 05/18/2021 9.6  8.6 - 10.5 mg/dL Final   • eGFR Non African Amer 05/18/2021 59* >60 mL/min/1.73 Final   • BUN/Creatinine Ratio 05/18/2021 18.6  7.0 - 25.0 Final   • Anion Gap 05/18/2021 10.0  5.0 - 15.0 mmol/L Final   • Protime 05/18/2021 12.7  11.7 - 14.2 Seconds Final   • INR 05/18/2021 0.97  0.90 - 1.10 Final   • COVID19 05/18/2021 Not Detected  Not Detected - Ref. Range Final   Lab on 05/11/2021   Component Date Value Ref Range Status   • Glucose 05/11/2021 119  74 - 124 mg/dL Final   • BUN 05/11/2021 16  6 - 20 mg/dL Final   • Creatinine 05/11/2021 0.88  0.60 - 1.10 mg/dL Final   • Sodium 05/11/2021 140  134 - 145 mmol/L Final   • Potassium 05/11/2021 4.9* 3.5 - 4.7 mmol/L Final   • Chloride 05/11/2021 100  98 - 107 mmol/L Final   • CO2 05/11/2021 29.1* 22.0 - 29.0 mmol/L Final   • Calcium 05/11/2021 9.8  8.5 - 10.2 mg/dL Final   • Total Protein 05/11/2021 7.7  6.3 - 8.0 g/dL Final   • Albumin 05/11/2021 4.30  3.50 - 5.20 g/dL Final   • ALT (SGPT) 05/11/2021 16  0 - 33 U/L Final   • AST (SGOT) 05/11/2021 14  0 - 32 U/L Final   • Alkaline Phosphatase 05/11/2021 133* 38 - 116 U/L Final   • Total Bilirubin 05/11/2021 0.2  0.2 - 1.2 mg/dL Final   • eGFR Non  Amer 05/11/2021 66  >60 mL/min/1.73 Final   • Globulin 05/11/2021 3.4  1.8 - 3.5 gm/dL Final   • A/G Ratio 05/11/2021 1.3  1.1 - 2.4 g/dL Final   • BUN/Creatinine Ratio 05/11/2021 18.2  7.3 - 30.0 Final   • Anion Gap 05/11/2021 10.9  5.0 - 15.0 mmol/L Final   • WBC 05/11/2021 11.54* 3.40 - 10.80 10*3/mm3 Final   • RBC 05/11/2021 4.93  3.77 - 5.28 10*6/mm3 Final    • Hemoglobin 05/11/2021 13.1  12.0 - 15.9 g/dL Final   • Hematocrit 05/11/2021 42.3  34.0 - 46.6 % Final   • MCV 05/11/2021 85.8  79.0 - 97.0 fL Final   • MCH 05/11/2021 26.6  26.6 - 33.0 pg Final   • MCHC 05/11/2021 31.0* 31.5 - 35.7 g/dL Final   • RDW 05/11/2021 14.0  12.3 - 15.4 % Final   • RDW-SD 05/11/2021 43.8  37.0 - 54.0 fl Final   • MPV 05/11/2021 9.3  6.0 - 12.0 fL Final   • Platelets 05/11/2021 435  140 - 450 10*3/mm3 Final   • Neutrophil % 05/11/2021 65.9  42.7 - 76.0 % Final   • Lymphocyte % 05/11/2021 23.7  19.6 - 45.3 % Final   • Monocyte % 05/11/2021 6.3  5.0 - 12.0 % Final   • Eosinophil % 05/11/2021 2.5  0.3 - 6.2 % Final   • Basophil % 05/11/2021 0.9  0.0 - 1.5 % Final   • Immature Grans % 05/11/2021 0.7* 0.0 - 0.5 % Final   • Neutrophils, Absolute 05/11/2021 7.60* 1.70 - 7.00 10*3/mm3 Final   • Lymphocytes, Absolute 05/11/2021 2.74  0.70 - 3.10 10*3/mm3 Final   • Monocytes, Absolute 05/11/2021 0.73  0.10 - 0.90 10*3/mm3 Final   • Eosinophils, Absolute 05/11/2021 0.29  0.00 - 0.40 10*3/mm3 Final   • Basophils, Absolute 05/11/2021 0.10  0.00 - 0.20 10*3/mm3 Final   • Immature Grans, Absolute 05/11/2021 0.08* 0.00 - 0.05 10*3/mm3 Final   • nRBC 05/11/2021 0.0  0.0 - 0.2 /100 WBC Final   Admission on 04/30/2021, Discharged on 05/01/2021   Component Date Value Ref Range Status   • Addendum 4 04/30/2021    Final                    Value:This result contains rich text formatting which cannot be displayed here.   • Addendum 3 04/30/2021    Final                    Value:This result contains rich text formatting which cannot be displayed here.   • Addendum 2 04/30/2021    Final                    Value:This result contains rich text formatting which cannot be displayed here.   • Addendum 04/30/2021    Final                    Value:This result contains rich text formatting which cannot be displayed here.   • Case Report 04/30/2021    Final                    Value:Surgical Pathology Report                          Case: [id number]                                  Authorizing Provider:  Raphael Kerr III, MD  Collected:           04/30/2021 10:53 AM          Ordering Location:     [facility]  Received:            04/30/2021 10:59 AM                                 MAIN OR                                                                      Pathologist:           Gurvinder Hernandez MD                                                         Specimens:   1) - Lymph Node, L6 LYMPH NODE FOR FROZEN - OR 8 - CALL [phone] WITH RESULTS                           2) - Lymph Node, L5 LYMPH NODE                                                            • Final Diagnosis 04/30/2021    Final                    Value:This result contains rich text formatting which cannot be displayed here.   • Comment 04/30/2021    Final                    Value:This result contains rich text formatting which cannot be displayed here.   • Intraoperative Consultation 04/30/2021    Final                    Value:This result contains rich text formatting which cannot be displayed here.   • Gross Description 04/30/2021    Final                    Value:This result contains rich text formatting which cannot be displayed here.   • Special Stains 04/30/2021    Final                    Value:This result contains rich text formatting which cannot be displayed here.   • Glucose 05/01/2021 115* 65 - 99 mg/dL Final   • BUN 05/01/2021 16  6 - 20 mg/dL Final   • Creatinine 05/01/2021 0.85  0.57 - 1.00 mg/dL Final   • Sodium 05/01/2021 137  136 - 145 mmol/L Final   • Potassium 05/01/2021 4.5  3.5 - 5.2 mmol/L Final   • Chloride 05/01/2021 106  98 - 107 mmol/L Final   • CO2 05/01/2021 21.6* 22.0 - 29.0 mmol/L Final   • Calcium 05/01/2021 8.7  8.6 - 10.5 mg/dL Final   • eGFR Non African Amer 05/01/2021 69  >60 mL/min/1.73 Final   • BUN/Creatinine Ratio 05/01/2021 18.8  7.0 - 25.0 Final   • Anion Gap 05/01/2021 9.4  5.0 - 15.0 mmol/L Final   • WBC  05/01/2021 13.43* 3.40 - 10.80 10*3/mm3 Final   • RBC 05/01/2021 4.39  3.77 - 5.28 10*6/mm3 Final   • Hemoglobin 05/01/2021 11.9* 12.0 - 15.9 g/dL Final   • Hematocrit 05/01/2021 38.3  34.0 - 46.6 % Final   • MCV 05/01/2021 87.2  79.0 - 97.0 fL Final   • MCH 05/01/2021 27.1  26.6 - 33.0 pg Final   • MCHC 05/01/2021 31.1* 31.5 - 35.7 g/dL Final   • RDW 05/01/2021 13.8  12.3 - 15.4 % Final   • RDW-SD 05/01/2021 44.2  37.0 - 54.0 fl Final   • MPV 05/01/2021 9.8  6.0 - 12.0 fL Final   • Platelets 05/01/2021 300  140 - 450 10*3/mm3 Final   • Neutrophil % 05/01/2021 76.5* 42.7 - 76.0 % Final   • Lymphocyte % 05/01/2021 13.2* 19.6 - 45.3 % Final   • Monocyte % 05/01/2021 9.6  5.0 - 12.0 % Final   • Eosinophil % 05/01/2021 0.1* 0.3 - 6.2 % Final   • Basophil % 05/01/2021 0.2  0.0 - 1.5 % Final   • Immature Grans % 05/01/2021 0.4  0.0 - 0.5 % Final   • Neutrophils, Absolute 05/01/2021 10.28* 1.70 - 7.00 10*3/mm3 Final   • Lymphocytes, Absolute 05/01/2021 1.77  0.70 - 3.10 10*3/mm3 Final   • Monocytes, Absolute 05/01/2021 1.29* 0.10 - 0.90 10*3/mm3 Final   • Eosinophils, Absolute 05/01/2021 0.01  0.00 - 0.40 10*3/mm3 Final   • Basophils, Absolute 05/01/2021 0.03  0.00 - 0.20 10*3/mm3 Final   • Immature Grans, Absolute 05/01/2021 0.05  0.00 - 0.05 10*3/mm3 Final   • nRBC 05/01/2021 0.0  0.0 - 0.2 /100 WBC Final   Lab on 04/28/2021   Component Date Value Ref Range Status   • SARS-CoV-2 PCR 04/28/2021 Not Detected  Not Detected Final    Nucleic acid specific to SARS-CoV-2 (COVID-19) virus was not detected in  this sample by the TaqPath (TM) COVID-19 Combo Kit.          SARS-CoV-2 (COVID-19) nucleic acid testing performed using [employer] Aptima (R) SARS-CoV-2 Assay or [employer] TaqPath (TM)  COVID-19 Combo Kit as indicated above under Emergency Use Authorization (EUA) from the FDA. Aptima (R) and TaqPath (TM) test performance  characteristics verified by [employer] in accordance with the FDAs Guidance Document  (Policy for Diagnostic Tests for Coronavirus Disease-2019  during the Public Health Emergency) issued on March 16, 2020. The laboratory is regulated under CLIA as qualified to perform high-complexity testing  Unless otherwise noted, all testing was performed at [facility], CLIA No. [id number], [place] State Licensee No. [id number]   Lab on 04/28/2021   Component Date Value Ref Range Status   • WBC 04/28/2021 9.84  3.40 - 10.80 10*3/mm3 Final   • RBC 04/28/2021 4.86  3.77 - 5.28 10*6/mm3 Final   • Hemoglobin 04/28/2021 13.6  12.0 - 15.9 g/dL Final   • Hematocrit 04/28/2021 40.4  34.0 - 46.6 % Final   • MCV 04/28/2021 83.1  79.0 - 97.0 fL Final   • MCH 04/28/2021 28.0  26.6 - 33.0 pg Final   • MCHC 04/28/2021 33.7  31.5 - 35.7 g/dL Final   • RDW 04/28/2021 14.3  12.3 - 15.4 % Final   • RDW-SD 04/28/2021 43.5  37.0 - 54.0 fl Final   • MPV 04/28/2021 9.5  6.0 - 12.0 fL Final   • Platelets 04/28/2021 275  140 - 450 10*3/mm3 Final   • Neutrophil % 04/28/2021 65.3  42.7 - 76.0 % Final   • Lymphocyte % 04/28/2021 24.0  19.6 - 45.3 % Final   • Monocyte % 04/28/2021 7.3  5.0 - 12.0 % Final   • Eosinophil % 04/28/2021 2.3  0.3 - 6.2 % Final   • Basophil % 04/28/2021 0.7  0.0 - 1.5 % Final   • Immature Grans % 04/28/2021 0.4  0.0 - 0.5 % Final   • Neutrophils, Absolute 04/28/2021 6.42  1.70 - 7.00 10*3/mm3 Final   • Lymphocytes, Absolute 04/28/2021 2.36  0.70 - 3.10 10*3/mm3 Final   • Monocytes, Absolute 04/28/2021 0.72  0.10 - 0.90 10*3/mm3 Final   • Eosinophils, Absolute 04/28/2021 0.23  0.00 - 0.40 10*3/mm3 Final   • Basophils, Absolute 04/28/2021 0.07  0.00 - 0.20 10*3/mm3 Final   • Immature Grans, Absolute 04/28/2021 0.04  0.00 - 0.05 10*3/mm3 Final   • nRBC 04/28/2021 0.0  0.0 - 0.2 /100 WBC Final   Pre-Admission Testing on 04/26/2021   Component Date Value Ref Range Status   • QT Interval 04/26/2021 400  ms Final   • Glucose 04/26/2021 101* 65 - 99 mg/dL Final   • BUN 04/26/2021 17  6 - 20 mg/dL Final   • Creatinine  04/26/2021 0.79  0.57 - 1.00 mg/dL Final   • Sodium 04/26/2021 140  136 - 145 mmol/L Final   • Potassium 04/26/2021 4.4  3.5 - 5.2 mmol/L Final   • Chloride 04/26/2021 103  98 - 107 mmol/L Final   • CO2 04/26/2021 27.2  22.0 - 29.0 mmol/L Final   • Calcium 04/26/2021 9.5  8.6 - 10.5 mg/dL Final   • eGFR Non African Amer 04/26/2021 75  >60 mL/min/1.73 Final   • BUN/Creatinine Ratio 04/26/2021 21.5  7.0 - 25.0 Final   • Anion Gap 04/26/2021 9.8  5.0 - 15.0 mmol/L Final   • Protime 04/26/2021 12.2  11.7 - 14.2 Seconds Final   • INR 04/26/2021 0.92  0.90 - 1.10 Final   • ABO Type 04/26/2021 O   Final   • RH type 04/26/2021 Positive   Final   • Antibody Screen 04/26/2021 Negative   Final   • T&S Expiration Date 04/26/2021 5/10/2021 11:59:00 PM   Final   • WBC 04/26/2021 8.53  3.40 - 10.80 10*3/mm3 Final   • RBC 04/26/2021 4.85  3.77 - 5.28 10*6/mm3 Final   • Hemoglobin 04/26/2021 12.9  12.0 - 15.9 g/dL Final   • Hematocrit 04/26/2021 40.0  34.0 - 46.6 % Final   • MCV 04/26/2021 82.5  79.0 - 97.0 fL Final   • MCH 04/26/2021 26.6  26.6 - 33.0 pg Final   • MCHC 04/26/2021 32.3  31.5 - 35.7 g/dL Final   • RDW 04/26/2021 13.9  12.3 - 15.4 % Final   • RDW-SD 04/26/2021 41.2  37.0 - 54.0 fl Final   • MPV 04/26/2021 9.8  6.0 - 12.0 fL Final   • Platelets 04/26/2021 310  140 - 450 10*3/mm3 Final   • Neutrophil % 04/26/2021 58.3  42.7 - 76.0 % Final   • Lymphocyte % 04/26/2021 27.1  19.6 - 45.3 % Final   • Monocyte % 04/26/2021 7.7  5.0 - 12.0 % Final   • Eosinophil % 04/26/2021 5.3  0.3 - 6.2 % Final   • Basophil % 04/26/2021 1.2  0.0 - 1.5 % Final   • Immature Grans % 04/26/2021 0.4  0.0 - 0.5 % Final   • Neutrophils, Absolute 04/26/2021 4.98  1.70 - 7.00 10*3/mm3 Final   • Lymphocytes, Absolute 04/26/2021 2.31  0.70 - 3.10 10*3/mm3 Final   • Monocytes, Absolute 04/26/2021 0.66  0.10 - 0.90 10*3/mm3 Final   • Eosinophils, Absolute 04/26/2021 0.45* 0.00 - 0.40 10*3/mm3 Final   • Basophils, Absolute 04/26/2021 0.10  0.00 - 0.20  10*3/mm3 Final   • Immature Grans, Absolute 04/26/2021 0.03  0.00 - 0.05 10*3/mm3 Final   • nRBC 04/26/2021 0.0  0.0 - 0.2 /100 WBC Final        MRI Thoracic Spine With & Without Contrast    Result Date: 5/24/2021   Within the posterior aspect of the left portion of the L1 vertebral body, there is a 7 mm indeterminate rounded focus of T1 hypointense signal. Unfortunately, this area is not well characterized on either the T2 fat-saturated images or the post gadolinium fat-saturated T1-weighted images as there is failure of fat saturation in these areas on this examination.  I recommend further evaluation with an MRI of the lumbar spine with and without the use of IV contrast. Ultimately, the study may not be diagnostic either potentially yielding this lesion as indeterminate representing either an atypical hemangioma or a metastatic focus. However, this exam would serve as a useful baseline study for further follow-up. These findings and recommendations are discussed with Dr. Snell on 05/24/2021 at approximately 3:30 PM.  There is mild levoconvex scoliotic curvature of the thoracic spine.  Incidental tiny right central disc protrusion at T4-5.   This report was finalized on 5/24/2021 3:48 PM by Dr. Stu Oates M.D.      XR Chest 1 View    Result Date: 5/20/2021  New right Mediport catheter as described.  This report was finalized on 5/20/2021 4:05 PM by Dr. Jelani Santacruz M.D.      XR Chest 1 View    Result Date: 5/1/2021  Electronically signed by Cecil Timmons M.D. on 05-01-21 at 0714    US Axilla Bilateral    Result Date: 5/20/2021  There is no evidence for axillary adenopathy in either axilla. Normal-appearing bilateral axillary lymph nodes are visualized.  This report was finalized on 5/20/2021 2:13 PM by Dr. Jeffrey Quevedo M.D.       4/28/2021  CBC-WBC 9.84, hemoglobin 13.4, platelets 275    MRI of the brain 5/14/2021-images independently reviewed and interpreted by me in detail summarized in  HPI  Bilateral axillary ultrasound 5/20/2021 images independently reviewed and interpreted by me in detail summarized in HPI    5/25/2021  CBC-WBC 13.33, hemoglobin 12.3, platelets 384,000  CMP-BUN 14, creatinine 0.76, alkaline phosphatase mildly elevated at 132    Assessment/Plan      *Bilateral breast cancer  · Invasive ductal carcinoma in both right and left breast and lesions measure under 1 cm.  No palpable lymphadenopathy although unsure if this has been evaluated by an axillary ultrasound.  No abnormal axillary lymphadenopathy noted on PET.  · Most likely clinical T1b N0 MX, both cancers were noted to be grade 2, ER/KS positive and HER-2 negative and Ki-67 less than 15%  VATS on 4/30/2021  Biopsy confirms adenocarcinoma of pulmonary primary  Discussed with Dr. Molina about delaying breast surgery and she is in agreement  We discussed starting neoadjuvant endocrine therapy with anastrozole 1 mg p.o. daily  · Patient experienced significant adverse effects with anastrozole  · Treatment changed to letrozole   · Axillary ultrasound 4/20/2021 - for any axillary lymphadenopathy  · Continue letrozole    *Adenocarcinoma of the left lung  · Most likely stage T1 N2 M0, stage III  · MRI of the thoracic spine ordered to further evaluate the abnormality seen on PET  · MRI of the brain negative   · Given that she is only 55 and fairly good performance status I discussed with her by with concurrent chemoradiation with cisplatin and Alimta every 3 weeks followed by durvalumab.  · Adverse effects including but not limited to myelosuppression, increased risk of infections, nausea, vomiting, renal dysfunction, ototoxicity, neurotoxicity have been discussed at length.  · She has been recommended to start folic acid and she will be administered B12 injection today.  · MRI of the thoracic spine performed 5/24/2021 shows no evidence of metastatic disease in the thoracic spine however in L1 there is a 7 mm lesion which is  indeterminate.  A lumbar spine MRI has been recommended.  · I personally discussed her case with Dr. Oates and he feels like MRI of the lumbar spine may also show that this lesion is indeterminate.  · Discussed the same with the patient and her .  · Even if this is metastatic disease this might be the only lesion of metastatic disease and then I would consider this oligometastatic disease.  · Therefore I plan to proceed with concurrent chemoradiation as scheduled.  · Labs reviewed and she is okay to proceed with cisplatin and Alimta.  · Day 1 of radiation today  · MRI of the lumbar spine will be scheduled    *Bilateral breast cancer-no family history of breast cancer but given synchronous breast cancer genetic testing has been sent.  Genetic testing with a variant of unknown significance.    *Anxiety-she is on several different medications currently.  Referral has been made to supportive oncology clinic today.  She is requesting an increase in the dose of the Xanax.  Okay to increase the dose to 1 mg nightly to help with sleep.  Further management of these medications will be deferred to supportive oncology once she has established care with them.  He has been started on gabapentin.  Continue follow-up with supportive oncology, this is unchanged    *Left-sided chest wall pain-secondary to VATS.  Most likely neuropathic.  Continue gabapentin and Norco as needed.  Unchanged    *Tobacco use-she plans on quitting once she starts chemotherapy    *Hypertension-continue lisinopril.  Blood pressure 132/80    *Follow-up-1 week

## 2021-05-25 NOTE — PROGRESS NOTES
"Outpatient Oncology Nutrition Assessment      Patient Name:  Holli Patel  YOB: 1965  MRN: [id number]      Assessment Date:  5/25/2021    Comments:  Patient begins treatment for lung cancer today. S/p VAT. Also diagnosed recently with breast cancer.   Patient was asleep at time of visit. Introduced myself to her , provided contact info and the [facility] \"what to eat during cancer treatment\" booklet. Will plan to follow throughout treatment course.    Reason for Assessment     Row Name 05/25/21 1200          Reason for Assessment    Reason For Assessment  diagnosis/disease state;physician consult     Diagnosis  cancer diagnosis/related complications adenocarcinoma upper lobe left lung, new breast cancer dx           Anthropometrics     Row Name 05/25/21 1200          Anthropometrics    Height  160 cm (62.99\")     Weight  78.5 kg (173 lb)        Ideal Body Weight (IBW)    Ideal Body Weight (IBW) (kg)  52.7     % Ideal Body Weight  148.9        Usual Body Weight (UBW)    Usual Body Weight  76.2 kg (168 lb)     % Usual Body Weight  102.98        Body Mass Index (BMI)    BMI (kg/m2)  30.72     BMI Assessment  BMI 30-34.9: obesity grade I         Labs/Tests/Procedures/Meds     Row Name 05/25/21 1200          Labs/Procedures/Meds    Lab Results Reviewed  reviewed, pertinent        Diagnostic Tests/Procedures    Diagnostic Test/Procedure Reviewed  reviewed, pertinent        Medications    Pertinent Medications Reviewed  reviewed, pertinent     Pertinent Medications Comments  arimidex, synthroid, prilosec, wellbutrin, decardon, zorfan, prozac, folvite, CARBOPLATIN, ALIMTA           Estimated/Assessed Needs     Row Name 05/25/21 1200          Calculation Measurements    Weight Used For Calculations  78.5 kg (173 lb)     Height  160 cm (62.99\")        Estimated/Assessed Needs    Additional Documentation  Fluid Requirements (Group);KCAL/KG (Group);Protein Requirements (Group)        KCAL/KG    KCAL/KG  25 " Kcal/Kg (kcal);30 Kcal/Kg (kcal)     25 Kcal/Kg (kcal)  1961.8     30 Kcal/Kg (kcal)  2354.16        Protein Requirements    Weight Used For Protein Calculations  78.5 kg (173 lb)     Est Protein Requirement Amount (gms/kg)  1.2 gm protein     Estimated Protein Requirements (gms/day)  94.17        Fluid Requirements    Fluid Requirements (mL/day)  2100     Estimated Fluid Requirement Method  RDA Method     RDA Method (mL)  2100                   Problem/Interventions:  Predicted suboptimal po intake related to treatment for lung cancer          Intervention Goal     Row Name 05/25/21 1200          Intervention Goal    General  Maintain nutrition;Provide information regarding MNT for treatment/condition;Meet nutritional needs for age/condition     PO  Meet estimated needs     Weight  No significant weight loss             Education/Evaluation     Row Name 05/25/21 1200          Education    Education  Will Instruct as appropriate        Monitor/Evaluation    Monitor  PO intake;Pertinent labs;Weight     Education Follow-up  Reinforce PRN           Electronically signed by:  Marga Moscoso RD, LD  05/25/21 12:41 EDT

## 2021-05-25 NOTE — TELEPHONE ENCOUNTER
Caller: [facility] [place] [id number]  - [phone] [facility] [phone] FX    Relationship:     Best call back number: [phone]    Medication needed:   Requested Prescriptions     Pending Prescriptions Disp Refills   • rizatriptan (MAXALT) 10 MG tablet [Pharmacy Med Name: RIZATRIPTAN 10 MG TABLET] 12 tablet 1     Sig: TAKE ONE TABLET BY MOUTH ONE TIME AS NEEDED FOR MIGRAINE FOR UP TO ONE DOSE.  MAY REPEAT IN 2 HOURS IF NEEDED       When do you need the refill by: 5/25/21    What additional details did the patient provide when requesting the medication: PATIENT IS OUT OF MEDICATION.     Does the patient have less than a 3 day supply:  [x] Yes  [] No    What is the patient's preferred pharmacy: [facility] [place] [id number]  - [phone] [facility] [phone] FX       
Improved

## 2021-05-26 ENCOUNTER — TELEPHONE (OUTPATIENT)
Dept: ONCOLOGY | Facility: CLINIC | Age: 56
End: 2021-05-26

## 2021-05-26 ENCOUNTER — OFFICE VISIT (OUTPATIENT)
Dept: PSYCHIATRY | Facility: HOSPITAL | Age: 56
End: 2021-05-26

## 2021-05-26 DIAGNOSIS — F41.1 GENERALIZED ANXIETY DISORDER: Primary | ICD-10-CM

## 2021-05-26 DIAGNOSIS — R59.0 MEDIASTINAL ADENOPATHY: ICD-10-CM

## 2021-05-26 PROCEDURE — 77386 CHG INTENSITY MODULATED RADIATION TX DLVR COMPLEX: CPT | Performed by: RADIOLOGY

## 2021-05-26 PROCEDURE — 77014 CHG CT GUIDANCE RADIATION THERAPY FLDS PLACEMENT: CPT | Performed by: RADIOLOGY

## 2021-05-26 PROCEDURE — 77386: CPT | Performed by: RADIOLOGY

## 2021-05-26 PROCEDURE — 99214 OFFICE O/P EST MOD 30 MIN: CPT | Performed by: NURSE PRACTITIONER

## 2021-05-26 RX ORDER — GABAPENTIN 300 MG/1
300 CAPSULE ORAL TAKE AS DIRECTED
Qty: 90 CAPSULE | Refills: 0 | Status: SHIPPED | OUTPATIENT
Start: 2021-05-26 | End: 2021-08-06

## 2021-05-26 NOTE — PROGRESS NOTES
Supportive Oncology Services  In Person Session    Subjective  Patient ID: Holli Patel is a 55 y.o. female is seen face to face in the Supportive Oncology Services ([facility]) Clinic.    CC: Improved pain, persistent anxiety, tearfulness    HPI:  Pt noted to be alert, oriented, and engaged in conversation. Affect and mood euthymic.  Pt reports improvement in sleep with addition of gabapentin. Recognizes anxiety surrounding provider/ scan. Appreciates reduced ruminative worry as she understands plan better. Continues to have an occasional night of poor sleep, although this is not disruptive or concerning.  Fortunately, pain related to lung biopsy has improved. Continues to reduced need for opioid management.  Pt has been taking gabapentin, although denies significant benefit to pain. Pt does feel sleep has been improved with this.   Anxiety and tearfulness remain elevated.  Pt continues to review feeling as thought total life is threatened. questions what will happen to her job, specifically as she contemplates treament for breast cancer followed by lung cancer.    Review of Systems   Constitutional: Positive for fatigue.   Psychiatric/Behavioral: Positive for sleep disturbance. The patient is nervous/anxious.      Medications Reviewed:  Gabapentin 300 mg 3 times daily  Xanax 0.5 mg nightly per outside provider; recent addition of additional 15 tabs monthly for as needed use    Diagnoses and all orders for this visit:    1. Generalized anxiety disorder (Primary)    2. Mediastinal adenopathy  -     gabapentin (NEURONTIN) 300 MG capsule; Take 1 capsule by mouth Take As Directed. 1 capsule PO in AM and 2 capsules PO at bedtime  Dispense: 90 capsule; Refill: 0    Plan of Care  Patient continues with challenges excepting dual cancer diagnoses and anticipatory anxiety surrounding side effects of treatment.  Reports improvement in sleep with addition of gabapentin; unfortunately has had challenges obtaining higher quantity  of Xanax.  Reviewed orders as written and notes surrounding allowance of 45 tablets of Xanax monthly, and patient reports being able to get this in a few days.  Risks associated with polypharmacy reviewed.  At present, will continue gabapentin as written.  Suggested use of Xanax additional half to full tab as needed anxiety or disrupted sleep.  Will not make any other adjustments at this time, although have discussed with patient abilities to assist with symptom management, mood disruption while on treatment.  Patient is in agreement.  Follow-up arranged in clinic in 4 weeks.    I spent 33 minutes caring for Holli on this date of service. This time includes time spent by me in the following activities: preparing for the visit, obtaining and/or reviewing a separately obtained history, performing a medically appropriate examination and/or evaluation, counseling and educating the patient/family/caregiver and documenting information in the medical record.

## 2021-05-26 NOTE — TELEPHONE ENCOUNTER
Caller: TANIYA    Relationship: PATIENT    Best call back number: [phone]     What is the best time to reach you: ANYTIME    Type of visit: MRI @ [facility]    Requested date: 05/28    If rescheduling, when is the original appointment: 05/27

## 2021-05-27 ENCOUNTER — HOSPITAL ENCOUNTER (OUTPATIENT)
Dept: MRI IMAGING | Facility: HOSPITAL | Age: 56
Discharge: HOME | End: 2021-05-27
Admitting: INTERNAL MEDICINE

## 2021-05-27 DIAGNOSIS — Z17.0 MALIGNANT NEOPLASM OF UPPER-OUTER QUADRANT OF RIGHT BREAST IN FEMALE, ESTROGEN RECEPTOR POSITIVE (HCC): ICD-10-CM

## 2021-05-27 DIAGNOSIS — C50.411 MALIGNANT NEOPLASM OF UPPER-OUTER QUADRANT OF RIGHT BREAST IN FEMALE, ESTROGEN RECEPTOR POSITIVE (HCC): ICD-10-CM

## 2021-05-27 PROCEDURE — 0 GADOBENATE DIMEGLUMINE 529 MG/ML SOLUTION: Performed by: INTERNAL MEDICINE

## 2021-05-27 PROCEDURE — 77386: CPT | Performed by: RADIOLOGY

## 2021-05-27 PROCEDURE — 77386 CHG INTENSITY MODULATED RADIATION TX DLVR COMPLEX: CPT | Performed by: RADIOLOGY

## 2021-05-27 PROCEDURE — 72158 MRI LUMBAR SPINE W/O & W/DYE: CPT

## 2021-05-27 PROCEDURE — A9577 INJ MULTIHANCE: HCPCS | Performed by: INTERNAL MEDICINE

## 2021-05-27 PROCEDURE — 77014 CHG CT GUIDANCE RADIATION THERAPY FLDS PLACEMENT: CPT | Performed by: RADIOLOGY

## 2021-05-27 RX ORDER — RIZATRIPTAN BENZOATE 10 MG/1
TABLET ORAL
Qty: 12 TABLET | Refills: 1 | Status: SHIPPED | OUTPATIENT
Start: 2021-05-27 | End: 2021-07-23

## 2021-05-27 RX ADMIN — GADOBENATE DIMEGLUMINE 15 ML: 529 INJECTION, SOLUTION INTRAVENOUS at 10:34

## 2021-05-28 PROCEDURE — 77014 CHG CT GUIDANCE RADIATION THERAPY FLDS PLACEMENT: CPT | Performed by: RADIOLOGY

## 2021-05-28 PROCEDURE — 77386 CHG INTENSITY MODULATED RADIATION TX DLVR COMPLEX: CPT | Performed by: RADIOLOGY

## 2021-05-28 PROCEDURE — 77386: CPT | Performed by: RADIOLOGY

## 2021-06-01 ENCOUNTER — APPOINTMENT (OUTPATIENT)
Dept: RADIATION ONCOLOGY | Facility: HOSPITAL | Age: 56
End: 2021-06-01

## 2021-06-01 ENCOUNTER — RADIATION ONCOLOGY WEEKLY ASSESSMENT (OUTPATIENT)
Dept: RADIATION ONCOLOGY | Facility: HOSPITAL | Age: 56
End: 2021-06-01

## 2021-06-01 DIAGNOSIS — C34.12 PRIMARY ADENOCARCINOMA OF UPPER LOBE OF LEFT LUNG (HCC): Primary | ICD-10-CM

## 2021-06-01 PROCEDURE — 77386 CHG INTENSITY MODULATED RADIATION TX DLVR COMPLEX: CPT | Performed by: RADIOLOGY

## 2021-06-01 PROCEDURE — 77386: CPT | Performed by: RADIOLOGY

## 2021-06-01 PROCEDURE — 77014 CHG CT GUIDANCE RADIATION THERAPY FLDS PLACEMENT: CPT | Performed by: RADIOLOGY

## 2021-06-01 PROCEDURE — 77336 RADIATION PHYSICS CONSULT: CPT | Performed by: RADIOLOGY

## 2021-06-01 NOTE — PROGRESS NOTES
Physician Weekly Management Note    Diagnosis:    1. Primary adenocarcinoma of upper lobe of left lung (CMS/HCC)       cT1, cN2, cM0    Reason for Visit:  Weekly Status Check (5/33)    Concurrent Chemo:   Yes    Notes on Treatment course, Films, Medical progress and Plan:  Doing well. Fatigue last Thursday after chemo but improved today. Reviewed typical timing. Mood ok. Seeing Bina Marcello. No problems or questions, cont on.    Performance Status: (1) Restricted in physically strenuous activity, ambulatory and able to do work of light nature  Subjective     ROS - Other than as listed above, as follows:  Constitutional - Normal - no complaints of fatigue, denies lack of appetite, fever, night sweats or change in weight.  Neck - Normal - denies neck masses, muscle weakness, neck pain, decreased range of motion or swelling.  Cardiovascular - Normal - denies arrhythmias, chest pain, dyspnea, edema, orthopnea or palpitations.  Respiratory - Normal - denies cough, dyspnea, hemoptysis, hiccoughs, pleuritic chest pain or wheezing.  Gastrointestinal - Normal - no complaints of constipation, abdominal pain, diarrhea, heartburn/dyspepsia, hematemesis, hemorrhoids, melena or GI bleeding, nausea, pain or cramping or vomiting.     There were no vitals filed for this visit.  Objective   Patient screened positive for depression based on a PHQ-9 score of 9 on 5/26/2021. Follow-up recommendations include: Referral to Mental Health specialist.    PHYSICAL EXAM - Other than listed above, as follows:  Constitutional - Normal - no evidence of impaired alertness, inadequate appearance, premature or advanced chronologic age, uncooperativeness, altered mood, affect or disorientation.  Neck - Normal - no evidence of tender or enlarged lymph nodes, neck abnormalities, restricted range of motion or enlarged thyroid.  Chest - Normal - no evidence of chest abnormalities, tender or enlarged lymph nodes.  Respiratory - Normal - no evidence of  "abnormal breat sounds, chest abnormalities on palpation and chest abnormalities on percussion and normal breath sounds.  Hematologic/Lymphatic - Normal - no evidence of tender or enlarged axillary lymph nodes nor tender or enlarged neck nodes.    Technical aspects reviewed:  Weekly OBI approved if applicable?  Yes  Weekly port films approved?  Yes  Change requests noted if applicable?  Yes  Patient setup and plan reviewed?  Yes    Chart Reviewed:  Continue current treatment plan?  Yes  Treatment plan change requested?  No    I have reviewed and marked as \"reviewed\" the current medications, allergies and problem list in the patients EMR.  I have reviewed the patient's medical, surgical  history in detail, reviewed any pertinent lab work  and updated the computerized patient record if needed.    Patient's Care Team:  Patient Care Team:  Vandana Gastelum MD as PCP - General (Family Medicine)  Vandana Gastelum MD as Referring Physician (Family Medicine)  Shell Thosmon MD as Consulting Physician (Radiation Oncology)  "

## 2021-06-02 ENCOUNTER — OFFICE VISIT (OUTPATIENT)
Dept: ONCOLOGY | Facility: CLINIC | Age: 56
End: 2021-06-02

## 2021-06-02 ENCOUNTER — INFUSION (OUTPATIENT)
Dept: ONCOLOGY | Facility: HOSPITAL | Age: 56
End: 2021-06-02

## 2021-06-02 VITALS
SYSTOLIC BLOOD PRESSURE: 113 MMHG | HEART RATE: 90 BPM | OXYGEN SATURATION: 96 % | DIASTOLIC BLOOD PRESSURE: 76 MMHG | TEMPERATURE: 97.1 F | HEIGHT: 63 IN | WEIGHT: 171.9 LBS | RESPIRATION RATE: 20 BRPM | BODY MASS INDEX: 30.46 KG/M2

## 2021-06-02 DIAGNOSIS — Z17.0 MALIGNANT NEOPLASM OF UPPER-OUTER QUADRANT OF RIGHT BREAST IN FEMALE, ESTROGEN RECEPTOR POSITIVE (HCC): Primary | ICD-10-CM

## 2021-06-02 DIAGNOSIS — Z79.899 HIGH RISK MEDICATION USE: ICD-10-CM

## 2021-06-02 DIAGNOSIS — C34.12 PRIMARY ADENOCARCINOMA OF UPPER LOBE OF LEFT LUNG (HCC): ICD-10-CM

## 2021-06-02 DIAGNOSIS — Z45.2 ENCOUNTER FOR FITTING AND ADJUSTMENT OF VASCULAR CATHETER: ICD-10-CM

## 2021-06-02 DIAGNOSIS — Z17.0 MALIGNANT NEOPLASM OF UPPER-INNER QUADRANT OF LEFT BREAST IN FEMALE, ESTROGEN RECEPTOR POSITIVE (HCC): Primary | ICD-10-CM

## 2021-06-02 DIAGNOSIS — C34.12 PRIMARY ADENOCARCINOMA OF UPPER LOBE OF LEFT LUNG (HCC): Primary | ICD-10-CM

## 2021-06-02 DIAGNOSIS — C50.411 MALIGNANT NEOPLASM OF UPPER-OUTER QUADRANT OF RIGHT BREAST IN FEMALE, ESTROGEN RECEPTOR POSITIVE (HCC): Primary | ICD-10-CM

## 2021-06-02 DIAGNOSIS — C50.212 MALIGNANT NEOPLASM OF UPPER-INNER QUADRANT OF LEFT BREAST IN FEMALE, ESTROGEN RECEPTOR POSITIVE (HCC): Primary | ICD-10-CM

## 2021-06-02 LAB
ALBUMIN SERPL-MCNC: 3.8 G/DL (ref 3.5–5.2)
ALBUMIN/GLOB SERPL: 1.3 G/DL (ref 1.1–2.4)
ALP SERPL-CCNC: 107 U/L (ref 38–116)
ALT SERPL W P-5'-P-CCNC: 18 U/L (ref 0–33)
ANION GAP SERPL CALCULATED.3IONS-SCNC: 10.9 MMOL/L (ref 5–15)
AST SERPL-CCNC: 13 U/L (ref 0–32)
BASOPHILS # BLD AUTO: 0.02 10*3/MM3 (ref 0–0.2)
BASOPHILS NFR BLD AUTO: 0.3 % (ref 0–1.5)
BILIRUB SERPL-MCNC: <0.2 MG/DL (ref 0.2–1.2)
BUN SERPL-MCNC: 15 MG/DL (ref 6–20)
BUN/CREAT SERPL: 13.6 (ref 7.3–30)
CALCIUM SPEC-SCNC: 9.2 MG/DL (ref 8.5–10.2)
CHLORIDE SERPL-SCNC: 103 MMOL/L (ref 98–107)
CO2 SERPL-SCNC: 27.1 MMOL/L (ref 22–29)
CREAT SERPL-MCNC: 1.1 MG/DL (ref 0.6–1.1)
DEPRECATED RDW RBC AUTO: 43 FL (ref 37–54)
EOSINOPHIL # BLD AUTO: 0.2 10*3/MM3 (ref 0–0.4)
EOSINOPHIL NFR BLD AUTO: 3.2 % (ref 0.3–6.2)
ERYTHROCYTE [DISTWIDTH] IN BLOOD BY AUTOMATED COUNT: 13.7 % (ref 12.3–15.4)
GFR SERPL CREATININE-BSD FRML MDRD: 51 ML/MIN/1.73
GLOBULIN UR ELPH-MCNC: 2.9 GM/DL (ref 1.8–3.5)
GLUCOSE SERPL-MCNC: 95 MG/DL (ref 74–124)
HCT VFR BLD AUTO: 38.9 % (ref 34–46.6)
HGB BLD-MCNC: 12 G/DL (ref 12–15.9)
IMM GRANULOCYTES # BLD AUTO: 0.05 10*3/MM3 (ref 0–0.05)
IMM GRANULOCYTES NFR BLD AUTO: 0.8 % (ref 0–0.5)
LYMPHOCYTES # BLD AUTO: 1.69 10*3/MM3 (ref 0.7–3.1)
LYMPHOCYTES NFR BLD AUTO: 27 % (ref 19.6–45.3)
MCH RBC QN AUTO: 26.5 PG (ref 26.6–33)
MCHC RBC AUTO-ENTMCNC: 30.8 G/DL (ref 31.5–35.7)
MCV RBC AUTO: 86.1 FL (ref 79–97)
MONOCYTES # BLD AUTO: 0.73 10*3/MM3 (ref 0.1–0.9)
MONOCYTES NFR BLD AUTO: 11.7 % (ref 5–12)
NEUTROPHILS NFR BLD AUTO: 3.57 10*3/MM3 (ref 1.7–7)
NEUTROPHILS NFR BLD AUTO: 57 % (ref 42.7–76)
NRBC BLD AUTO-RTO: 0 /100 WBC (ref 0–0.2)
PLATELET # BLD AUTO: 244 10*3/MM3 (ref 140–450)
PMV BLD AUTO: 9.3 FL (ref 6–12)
POTASSIUM SERPL-SCNC: 4.6 MMOL/L (ref 3.5–4.7)
PROT SERPL-MCNC: 6.7 G/DL (ref 6.3–8)
RBC # BLD AUTO: 4.52 10*6/MM3 (ref 3.77–5.28)
SODIUM SERPL-SCNC: 141 MMOL/L (ref 134–145)
WBC # BLD AUTO: 6.26 10*3/MM3 (ref 3.4–10.8)

## 2021-06-02 PROCEDURE — 80053 COMPREHEN METABOLIC PANEL: CPT

## 2021-06-02 PROCEDURE — 77014 CHG CT GUIDANCE RADIATION THERAPY FLDS PLACEMENT: CPT | Performed by: RADIOLOGY

## 2021-06-02 PROCEDURE — 25010000002 HEPARIN LOCK FLUSH PER 10 UNITS: Performed by: INTERNAL MEDICINE

## 2021-06-02 PROCEDURE — 77386: CPT | Performed by: RADIOLOGY

## 2021-06-02 PROCEDURE — 77386 CHG INTENSITY MODULATED RADIATION TX DLVR COMPLEX: CPT | Performed by: RADIOLOGY

## 2021-06-02 PROCEDURE — 96360 HYDRATION IV INFUSION INIT: CPT

## 2021-06-02 PROCEDURE — 99214 OFFICE O/P EST MOD 30 MIN: CPT | Performed by: NURSE PRACTITIONER

## 2021-06-02 PROCEDURE — 77427 RADIATION TX MANAGEMENT X5: CPT | Performed by: RADIOLOGY

## 2021-06-02 PROCEDURE — 85025 COMPLETE CBC W/AUTO DIFF WBC: CPT

## 2021-06-02 RX ORDER — HEPARIN SODIUM (PORCINE) LOCK FLUSH IV SOLN 100 UNIT/ML 100 UNIT/ML
500 SOLUTION INTRAVENOUS AS NEEDED
Status: CANCELLED | OUTPATIENT
Start: 2021-06-02

## 2021-06-02 RX ORDER — SODIUM CHLORIDE 9 MG/ML
1000 INJECTION, SOLUTION INTRAVENOUS ONCE
Status: COMPLETED | OUTPATIENT
Start: 2021-06-02 | End: 2021-06-02

## 2021-06-02 RX ORDER — SODIUM CHLORIDE 0.9 % (FLUSH) 0.9 %
10 SYRINGE (ML) INJECTION AS NEEDED
Status: CANCELLED | OUTPATIENT
Start: 2021-06-02

## 2021-06-02 RX ORDER — HEPARIN SODIUM (PORCINE) LOCK FLUSH IV SOLN 100 UNIT/ML 100 UNIT/ML
500 SOLUTION INTRAVENOUS AS NEEDED
Status: DISCONTINUED | OUTPATIENT
Start: 2021-06-02 | End: 2021-06-02 | Stop reason: HOSPADM

## 2021-06-02 RX ORDER — SODIUM CHLORIDE 0.9 % (FLUSH) 0.9 %
10 SYRINGE (ML) INJECTION AS NEEDED
Status: DISCONTINUED | OUTPATIENT
Start: 2021-06-02 | End: 2021-06-02 | Stop reason: HOSPADM

## 2021-06-02 RX ADMIN — SODIUM CHLORIDE 1000 ML: 9 INJECTION, SOLUTION INTRAVENOUS at 08:55

## 2021-06-02 RX ADMIN — Medication 500 UNITS: at 10:25

## 2021-06-02 RX ADMIN — SODIUM CHLORIDE, PRESERVATIVE FREE 10 ML: 5 INJECTION INTRAVENOUS at 10:25

## 2021-06-02 NOTE — PROGRESS NOTES
Subjective   Holli Patel is a 55 y.o. female.  Referred by Dr. 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    3/1/2021-bilateral screening mammogram-microcalcifications seen in the posterior one third of the lateral aspect of the right breast.  Area of focal asymmetry seen in the middle one third of the upper inner quadrant of the left breast.    3/1/2021-DEXA scan-T score of -2.2 on the lumbar spine and T score of -2.3 in the left hip and T score of -1.9 in the right hip.  Findings consistent with osteopenia    3/23/2021-screening lung CT-there is a 10 x 11 mm solid nodule in the left upper lobe.  Enlarged AP window lymph node measuring 14 x 10 mm.  Suggestion of a possible 9 mm left hilar lymph node.  Heavy coronary artery calcification.    3/23/2021-diagnostic mammogram bilateral-cluster of microcalcifications in the middle third lateral aspect of the right breast.  Left breast demonstrates persistence of the area of focal asymmetry in the region.    Ultrasound-left breast ultrasound at 10 o'clock position, 6 cm from the nipple there is a 0.4 cm irregular hypoechoic lesion.  Stereotactic biopsy of the right breast calcifications recommended.  Ultrasound-guided biopsy of the left breast lesion recommended    4/7/2021-right breast ear tactic biopsy and left breast ultrasound-guided biopsy  Pathology  Right breast-invasive ductal carcinoma, grade 2, lymphovascular invasion present, ER +99% strong, MN +80% moderate, HER-2 negative, Ki-67 12%  Left breast upper inner quadrant 10 o'clock position biopsy, invasive ductal carcinoma, grade   2, ER +99% strong, MN +40% strong, HER-2 2+ on immunohistochemistry, nonamplified on FISH Ki-67 10%    4/14/2021-PET/CT-FDG avid aortopulmonary window lymph node measures 0.9 cm with an SUV of 7.5.  FDG avid left hilar lymph node  measures 1 cm with an SUV of 17.2.  Irregular soft tissue density in the right breast measuring 3.7 x 3.8 cm is favored to be secondary to the recent breast biopsy.  1.1 cm pulmonary nodule within the left upper lobe with SUV 9.7 .  Sub-6 mm pulmonary nodules are present in the right lower lobe.  No evidence of lymphadenopathy or metastatic disease in the abdomen.  Focal area of FDG uptake within the central canal posterior to T11-T12 displaced demonstrating an SUV of 5.5 thought to be reactive however MRI is recommended for further evaluation.    She was seen by Dr. Molina who initially planned for breast surgery however  due to the PET abnormalities she has been referred to Dr. Kerr who plans to do a wound on 4/30/2021.  Dr. Molina's and Dr. Kerr's notes reviewed.    Patient denies any family history of breast cancer.  Her mother had lymphoma.  Maternal grandmother had colon cancer.  Prior to that screening mammogram she did not have any palpable abnormalities of either breast.    She has been a heavy smoker for about 40 years and smoked 2 packs/day.  Denies any recent weight changes, new bone pains, cough, abdominal pain nausea vomiting constipation or diarrhea.  She does have anxiety and has been on several medications.  She has recently been started on Xanax to help with the mood and also with insomnia.    Patient had a video-assisted thoracoscopy and mediastinal lymphadenectomy on 4/30/2021.  L5-L6 lymph nodes were biopsied however the small pulmonary nodule in the left upper lobe was not difficult to find.  Pathology showed moderately differentiated adenocarcinoma which is CK7 and TTF-1 positive.  Consistent with lung primary.  Ki-67 85%.    5/14/2021-MRI of the brain-minimal chronic small vessel ischemic change in the white matter.  Otherwise negative MRI.    5/20/2021-bilateral axillary ultrasound-no evidence of axillary lymphadenopathy in either axilla.  Normal-appearing bilateral axillary lymph  nodes visualized.    Interval history  The patient is here today with the above-mentioned history, here today for follow up after cycle 1 of Cisplatin Alimta on 5/25/2021.  She reports feeling reasonably well today.  She did experience nausea two days after her first cycle of treatment, which she treated with Zofran and felt this controlled her nausea well.  She complains of arthralgias in her legs, for which she is taking tylenol occasionally.  Her bowels are moving regularly.  She is eating and drinking adequately.  She has no new complaints today.    The following portions of the patient's history were reviewed and updated as appropriate: allergies, current medications, past family history, past medical history, past social history, past surgical history and problem list.    Past Medical History:   Diagnosis Date   • Anxiety    • Dyspnea on exertion    • SHAYY (generalized anxiety disorder)     RELATED HIGH BLOOD PRESSURE   • GERD (gastroesophageal reflux disease)    • High blood pressure     ANXIETY RELATED   • Hyperlipidemia    • Hypothyroidism    • Insomnia    • Lung cancer (CMS/HCC)    • Lung nodule    • Malignant neoplasm of upper-outer quadrant of right breast in female, estrogen receptor positive (CMS/HCC) 4/13/2021   • Migraine    • PONV (postoperative nausea and vomiting)         Past Surgical History:   Procedure Laterality Date   • BREAST BIOPSY Bilateral 04/07/2021    Right Breast 10 o'clock & Left breast 10 o'clock 6 cm from nipple, [facility]   • CLOSED REDUCTION HIP DISLOCATION Left 4/30/2021    Procedure: BRONCHOSCOPY, LEFT VIDEO ASSITED THORACOSCOPY, ROBOTIC ASSSITED MEDIASTINAL LYMPHADENECTOMY WITH INTERCOSTAL NERVE BLOCKS;  Surgeon: Raphael Kerr III, MD;  Location: [facility];  Service: [facility];  Laterality: Left;   • COLONOSCOPY     • VENOUS ACCESS DEVICE (PORT) INSERTION Right 5/20/2021    Procedure: INSERTION VENOUS ACCESS DEVICE;  Surgeon: Raphael Kerr III, MD;  Location: [facility]  "OR;  Service: Thoracic;  Laterality: Right;   • WRIST SURGERY Right         Family History   Problem Relation Age of Onset   • Cancer Father         LYMPHATIC   • Alcohol abuse Brother    • Colon cancer Maternal Grandmother    • Malig Hyperthermia Neg Hx         Social History     Socioeconomic History   • Marital status:      Spouse name: Not on file   • Number of children: Not on file   • Years of education: Not on file   • Highest education level: Not on file   Tobacco Use   • Smoking status: Current Every Day Smoker     Packs/day: 1.00     Types: Electronic Cigarette, Cigarettes     Start date: 1981   • Smokeless tobacco: Never Used   • Tobacco comment: ABT 15 CIGARETTES DAILY   Vaping Use   • Vaping Use: Never used   Substance and Sexual Activity   • Alcohol use: Never   • Drug use: Never   • Sexual activity: Yes        OB History        2    Para   2    Term   2            AB        Living           SAB        TAB        Ectopic        Molar        Multiple        Live Births                Age of menarche-10  Age at menopause-42  Oral contraceptive pill use-15 years  No HRT use  She has 2 children  Age at first live childbirth-17    No Known Allergies     Review of Systems   Review of systems as mentioned in the HPI    Objective   Blood pressure 113/76, pulse 90, temperature 97.1 °F (36.2 °C), temperature source Skin, resp. rate 20, height 160 cm (62.99\"), weight 78 kg (171 lb 14.4 oz), SpO2 96 %, not currently breastfeeding.   Physical Exam  Vitals reviewed.   Constitutional:       Appearance: She is obese.   HENT:      Head: Normocephalic.      Nose: Nose normal.      Mouth/Throat:      Mouth: Mucous membranes are moist.      Pharynx: Oropharynx is clear.   Eyes:      Extraocular Movements: Extraocular movements intact.      Conjunctiva/sclera: Conjunctivae normal.      Pupils: Pupils are equal, round, and reactive to light.   Cardiovascular:      Rate and Rhythm: Normal rate and " regular rhythm.      Pulses: Normal pulses.      Heart sounds: Normal heart sounds.   Pulmonary:      Effort: Pulmonary effort is normal.      Breath sounds: Normal breath sounds.   Abdominal:      General: Abdomen is flat. Bowel sounds are normal. There is no distension.      Palpations: There is no mass.   Musculoskeletal:         General: Normal range of motion.      Cervical back: Normal range of motion.   Skin:     General: Skin is warm.   Neurological:      General: No focal deficit present.      Mental Status: She is alert and oriented to person, place, and time.   Psychiatric:         Mood and Affect: Mood normal.         Behavior: Behavior normal.         Thought Content: Thought content normal.         Judgment: Judgment normal.     Breast Exam: Right breast with a large postbiopsy hematoma in the upper outer quadrant measuring 4 cm.  Visible ecchymosis.  No other palpable abnormalities.  Left breast with no palpable abnormalities.  No right or left axillary lymphadenopathy    I have reexamined the patient and the results are consistent with the previously documented exam. KORI Guerrero     Infusion on 06/02/2021   Component Date Value Ref Range Status   • Glucose 06/02/2021 95  74 - 124 mg/dL Final   • BUN 06/02/2021 15  6 - 20 mg/dL Final   • Creatinine 06/02/2021 1.10  0.60 - 1.10 mg/dL Final   • Sodium 06/02/2021 141  134 - 145 mmol/L Final   • Potassium 06/02/2021 4.6  3.5 - 4.7 mmol/L Final   • Chloride 06/02/2021 103  98 - 107 mmol/L Final   • CO2 06/02/2021 27.1  22.0 - 29.0 mmol/L Final   • Calcium 06/02/2021 9.2  8.5 - 10.2 mg/dL Final   • Total Protein 06/02/2021 6.7  6.3 - 8.0 g/dL Final   • Albumin 06/02/2021 3.80  3.50 - 5.20 g/dL Final   • ALT (SGPT) 06/02/2021 18  0 - 33 U/L Final   • AST (SGOT) 06/02/2021 13  0 - 32 U/L Final   • Alkaline Phosphatase 06/02/2021 107  38 - 116 U/L Final   • Total Bilirubin 06/02/2021 <0.2* 0.2 - 1.2 mg/dL Final   • eGFR Non African Amer 06/02/2021 51*  >60 mL/min/1.73 Final   • Globulin 06/02/2021 2.9  1.8 - 3.5 gm/dL Final   • A/G Ratio 06/02/2021 1.3  1.1 - 2.4 g/dL Final   • BUN/Creatinine Ratio 06/02/2021 13.6  7.3 - 30.0 Final   • Anion Gap 06/02/2021 10.9  5.0 - 15.0 mmol/L Final   • WBC 06/02/2021 6.26  3.40 - 10.80 10*3/mm3 Final   • RBC 06/02/2021 4.52  3.77 - 5.28 10*6/mm3 Final   • Hemoglobin 06/02/2021 12.0  12.0 - 15.9 g/dL Final   • Hematocrit 06/02/2021 38.9  34.0 - 46.6 % Final   • MCV 06/02/2021 86.1  79.0 - 97.0 fL Final   • MCH 06/02/2021 26.5* 26.6 - 33.0 pg Final   • MCHC 06/02/2021 30.8* 31.5 - 35.7 g/dL Final   • RDW 06/02/2021 13.7  12.3 - 15.4 % Final   • RDW-SD 06/02/2021 43.0  37.0 - 54.0 fl Final   • MPV 06/02/2021 9.3  6.0 - 12.0 fL Final   • Platelets 06/02/2021 244  140 - 450 10*3/mm3 Final   • Neutrophil % 06/02/2021 57.0  42.7 - 76.0 % Final   • Lymphocyte % 06/02/2021 27.0  19.6 - 45.3 % Final   • Monocyte % 06/02/2021 11.7  5.0 - 12.0 % Final   • Eosinophil % 06/02/2021 3.2  0.3 - 6.2 % Final   • Basophil % 06/02/2021 0.3  0.0 - 1.5 % Final   • Immature Grans % 06/02/2021 0.8* 0.0 - 0.5 % Final   • Neutrophils, Absolute 06/02/2021 3.57  1.70 - 7.00 10*3/mm3 Final   • Lymphocytes, Absolute 06/02/2021 1.69  0.70 - 3.10 10*3/mm3 Final   • Monocytes, Absolute 06/02/2021 0.73  0.10 - 0.90 10*3/mm3 Final   • Eosinophils, Absolute 06/02/2021 0.20  0.00 - 0.40 10*3/mm3 Final   • Basophils, Absolute 06/02/2021 0.02  0.00 - 0.20 10*3/mm3 Final   • Immature Grans, Absolute 06/02/2021 0.05  0.00 - 0.05 10*3/mm3 Final   • nRBC 06/02/2021 0.0  0.0 - 0.2 /100 WBC Final   Infusion on 05/25/2021   Component Date Value Ref Range Status   • Glucose 05/25/2021 125* 74 - 124 mg/dL Final   • BUN 05/25/2021 14  6 - 20 mg/dL Final   • Creatinine 05/25/2021 0.76  0.60 - 1.10 mg/dL Final   • Sodium 05/25/2021 139  134 - 145 mmol/L Final   • Potassium 05/25/2021 4.5  3.5 - 4.7 mmol/L Final   • Chloride 05/25/2021 101  98 - 107 mmol/L Final   • CO2  05/25/2021 27.1  22.0 - 29.0 mmol/L Final   • Calcium 05/25/2021 9.9  8.5 - 10.2 mg/dL Final   • Total Protein 05/25/2021 7.4  6.3 - 8.0 g/dL Final   • Albumin 05/25/2021 4.20  3.50 - 5.20 g/dL Final   • ALT (SGPT) 05/25/2021 14  0 - 33 U/L Final   • AST (SGOT) 05/25/2021 12  0 - 32 U/L Final   • Alkaline Phosphatase 05/25/2021 132* 38 - 116 U/L Final   • Total Bilirubin 05/25/2021 <0.2* 0.2 - 1.2 mg/dL Final   • eGFR Non African Amer 05/25/2021 78  >60 mL/min/1.73 Final   • Globulin 05/25/2021 3.2  1.8 - 3.5 gm/dL Final   • A/G Ratio 05/25/2021 1.3  1.1 - 2.4 g/dL Final   • BUN/Creatinine Ratio 05/25/2021 18.4  7.3 - 30.0 Final   • Anion Gap 05/25/2021 10.9  5.0 - 15.0 mmol/L Final   • Magnesium 05/25/2021 1.7* 1.8 - 2.5 mg/dL Final   • WBC 05/25/2021 13.33* 3.40 - 10.80 10*3/mm3 Final   • RBC 05/25/2021 4.62  3.77 - 5.28 10*6/mm3 Final   • Hemoglobin 05/25/2021 12.3  12.0 - 15.9 g/dL Final   • Hematocrit 05/25/2021 39.2  34.0 - 46.6 % Final   • MCV 05/25/2021 84.8  79.0 - 97.0 fL Final   • MCH 05/25/2021 26.6  26.6 - 33.0 pg Final   • MCHC 05/25/2021 31.4* 31.5 - 35.7 g/dL Final   • RDW 05/25/2021 13.6  12.3 - 15.4 % Final   • RDW-SD 05/25/2021 42.2  37.0 - 54.0 fl Final   • MPV 05/25/2021 9.5  6.0 - 12.0 fL Final   • Platelets 05/25/2021 384  140 - 450 10*3/mm3 Final   • Neutrophil % 05/25/2021 82.7* 42.7 - 76.0 % Final   • Lymphocyte % 05/25/2021 11.9* 19.6 - 45.3 % Final   • Monocyte % 05/25/2021 4.3* 5.0 - 12.0 % Final   • Eosinophil % 05/25/2021 0.1* 0.3 - 6.2 % Final   • Basophil % 05/25/2021 0.2  0.0 - 1.5 % Final   • Immature Grans % 05/25/2021 0.8* 0.0 - 0.5 % Final   • Neutrophils, Absolute 05/25/2021 11.04* 1.70 - 7.00 10*3/mm3 Final   • Lymphocytes, Absolute 05/25/2021 1.59  0.70 - 3.10 10*3/mm3 Final   • Monocytes, Absolute 05/25/2021 0.57  0.10 - 0.90 10*3/mm3 Final   • Eosinophils, Absolute 05/25/2021 0.01  0.00 - 0.40 10*3/mm3 Final   • Basophils, Absolute 05/25/2021 0.02  0.00 - 0.20 10*3/mm3  Final   • Immature Grans, Absolute 05/25/2021 0.10* 0.00 - 0.05 10*3/mm3 Final   • nRBC 05/25/2021 0.0  0.0 - 0.2 /100 WBC Final   Lab on 05/21/2021   Component Date Value Ref Range Status   • Glucose 05/21/2021 117  74 - 124 mg/dL Final   • BUN 05/21/2021 13  6 - 20 mg/dL Final   • Creatinine 05/21/2021 0.92  0.60 - 1.10 mg/dL Final   • Sodium 05/21/2021 137  134 - 145 mmol/L Final   • Potassium 05/21/2021 4.8* 3.5 - 4.7 mmol/L Final   • Chloride 05/21/2021 99  98 - 107 mmol/L Final   • CO2 05/21/2021 28.5  22.0 - 29.0 mmol/L Final   • Calcium 05/21/2021 9.6  8.5 - 10.2 mg/dL Final   • Total Protein 05/21/2021 7.8  6.3 - 8.0 g/dL Final   • Albumin 05/21/2021 4.20  3.50 - 5.20 g/dL Final   • ALT (SGPT) 05/21/2021 11  0 - 33 U/L Final   • AST (SGOT) 05/21/2021 12  0 - 32 U/L Final   • Alkaline Phosphatase 05/21/2021 136* 38 - 116 U/L Final   • Total Bilirubin 05/21/2021 <0.2* 0.2 - 1.2 mg/dL Final   • eGFR Non  Amer 05/21/2021 63  >60 mL/min/1.73 Final   • Globulin 05/21/2021 3.6* 1.8 - 3.5 gm/dL Final   • A/G Ratio 05/21/2021 1.2  1.1 - 2.4 g/dL Final   • BUN/Creatinine Ratio 05/21/2021 14.1  7.3 - 30.0 Final   • Anion Gap 05/21/2021 9.5  5.0 - 15.0 mmol/L Final   • WBC 05/21/2021 11.98* 3.40 - 10.80 10*3/mm3 Final   • RBC 05/21/2021 4.75  3.77 - 5.28 10*6/mm3 Final   • Hemoglobin 05/21/2021 12.5  12.0 - 15.9 g/dL Final   • Hematocrit 05/21/2021 40.9  34.0 - 46.6 % Final   • MCV 05/21/2021 86.1  79.0 - 97.0 fL Final   • MCH 05/21/2021 26.3* 26.6 - 33.0 pg Final   • MCHC 05/21/2021 30.6* 31.5 - 35.7 g/dL Final   • RDW 05/21/2021 13.7  12.3 - 15.4 % Final   • RDW-SD 05/21/2021 42.9  37.0 - 54.0 fl Final   • MPV 05/21/2021 9.5  6.0 - 12.0 fL Final   • Platelets 05/21/2021 363  140 - 450 10*3/mm3 Final   • Neutrophil % 05/21/2021 73.7  42.7 - 76.0 % Final   • Lymphocyte % 05/21/2021 15.6* 19.6 - 45.3 % Final   • Monocyte % 05/21/2021 8.4  5.0 - 12.0 % Final   • Eosinophil % 05/21/2021 1.5  0.3 - 6.2 % Final   •  Basophil % 05/21/2021 0.5  0.0 - 1.5 % Final   • Immature Grans % 05/21/2021 0.3  0.0 - 0.5 % Final   • Neutrophils, Absolute 05/21/2021 8.82* 1.70 - 7.00 10*3/mm3 Final   • Lymphocytes, Absolute 05/21/2021 1.87  0.70 - 3.10 10*3/mm3 Final   • Monocytes, Absolute 05/21/2021 1.01* 0.10 - 0.90 10*3/mm3 Final   • Eosinophils, Absolute 05/21/2021 0.18  0.00 - 0.40 10*3/mm3 Final   • Basophils, Absolute 05/21/2021 0.06  0.00 - 0.20 10*3/mm3 Final   • Immature Grans, Absolute 05/21/2021 0.04  0.00 - 0.05 10*3/mm3 Final   • nRBC 05/21/2021 0.0  0.0 - 0.2 /100 WBC Final   Pre-Admission Testing on 05/18/2021   Component Date Value Ref Range Status   • Glucose 05/18/2021 100* 65 - 99 mg/dL Final   • BUN 05/18/2021 18  6 - 20 mg/dL Final   • Creatinine 05/18/2021 0.97  0.57 - 1.00 mg/dL Final   • Sodium 05/18/2021 136  136 - 145 mmol/L Final   • Potassium 05/18/2021 5.0  3.5 - 5.2 mmol/L Final   • Chloride 05/18/2021 97* 98 - 107 mmol/L Final   • CO2 05/18/2021 29.0  22.0 - 29.0 mmol/L Final   • Calcium 05/18/2021 9.6  8.6 - 10.5 mg/dL Final   • eGFR Non African Amer 05/18/2021 59* >60 mL/min/1.73 Final   • BUN/Creatinine Ratio 05/18/2021 18.6  7.0 - 25.0 Final   • Anion Gap 05/18/2021 10.0  5.0 - 15.0 mmol/L Final   • Protime 05/18/2021 12.7  11.7 - 14.2 Seconds Final   • INR 05/18/2021 0.97  0.90 - 1.10 Final   • COVID19 05/18/2021 Not Detected  Not Detected - Ref. Range Final   Lab on 05/11/2021   Component Date Value Ref Range Status   • Glucose 05/11/2021 119  74 - 124 mg/dL Final   • BUN 05/11/2021 16  6 - 20 mg/dL Final   • Creatinine 05/11/2021 0.88  0.60 - 1.10 mg/dL Final   • Sodium 05/11/2021 140  134 - 145 mmol/L Final   • Potassium 05/11/2021 4.9* 3.5 - 4.7 mmol/L Final   • Chloride 05/11/2021 100  98 - 107 mmol/L Final   • CO2 05/11/2021 29.1* 22.0 - 29.0 mmol/L Final   • Calcium 05/11/2021 9.8  8.5 - 10.2 mg/dL Final   • Total Protein 05/11/2021 7.7  6.3 - 8.0 g/dL Final   • Albumin 05/11/2021 4.30  3.50 - 5.20  g/dL Final   • ALT (SGPT) 05/11/2021 16  0 - 33 U/L Final   • AST (SGOT) 05/11/2021 14  0 - 32 U/L Final   • Alkaline Phosphatase 05/11/2021 133* 38 - 116 U/L Final   • Total Bilirubin 05/11/2021 0.2  0.2 - 1.2 mg/dL Final   • eGFR Non  Amer 05/11/2021 66  >60 mL/min/1.73 Final   • Globulin 05/11/2021 3.4  1.8 - 3.5 gm/dL Final   • A/G Ratio 05/11/2021 1.3  1.1 - 2.4 g/dL Final   • BUN/Creatinine Ratio 05/11/2021 18.2  7.3 - 30.0 Final   • Anion Gap 05/11/2021 10.9  5.0 - 15.0 mmol/L Final   • WBC 05/11/2021 11.54* 3.40 - 10.80 10*3/mm3 Final   • RBC 05/11/2021 4.93  3.77 - 5.28 10*6/mm3 Final   • Hemoglobin 05/11/2021 13.1  12.0 - 15.9 g/dL Final   • Hematocrit 05/11/2021 42.3  34.0 - 46.6 % Final   • MCV 05/11/2021 85.8  79.0 - 97.0 fL Final   • MCH 05/11/2021 26.6  26.6 - 33.0 pg Final   • MCHC 05/11/2021 31.0* 31.5 - 35.7 g/dL Final   • RDW 05/11/2021 14.0  12.3 - 15.4 % Final   • RDW-SD 05/11/2021 43.8  37.0 - 54.0 fl Final   • MPV 05/11/2021 9.3  6.0 - 12.0 fL Final   • Platelets 05/11/2021 435  140 - 450 10*3/mm3 Final   • Neutrophil % 05/11/2021 65.9  42.7 - 76.0 % Final   • Lymphocyte % 05/11/2021 23.7  19.6 - 45.3 % Final   • Monocyte % 05/11/2021 6.3  5.0 - 12.0 % Final   • Eosinophil % 05/11/2021 2.5  0.3 - 6.2 % Final   • Basophil % 05/11/2021 0.9  0.0 - 1.5 % Final   • Immature Grans % 05/11/2021 0.7* 0.0 - 0.5 % Final   • Neutrophils, Absolute 05/11/2021 7.60* 1.70 - 7.00 10*3/mm3 Final   • Lymphocytes, Absolute 05/11/2021 2.74  0.70 - 3.10 10*3/mm3 Final   • Monocytes, Absolute 05/11/2021 0.73  0.10 - 0.90 10*3/mm3 Final   • Eosinophils, Absolute 05/11/2021 0.29  0.00 - 0.40 10*3/mm3 Final   • Basophils, Absolute 05/11/2021 0.10  0.00 - 0.20 10*3/mm3 Final   • Immature Grans, Absolute 05/11/2021 0.08* 0.00 - 0.05 10*3/mm3 Final   • nRBC 05/11/2021 0.0  0.0 - 0.2 /100 WBC Final        MRI Thoracic Spine With & Without Contrast    Result Date: 5/24/2021   Within the posterior aspect of the left  portion of the L1 vertebral body, there is a 7 mm indeterminate rounded focus of T1 hypointense signal. Unfortunately, this area is not well characterized on either the T2 fat-saturated images or the post gadolinium fat-saturated T1-weighted images as there is failure of fat saturation in these areas on this examination.  I recommend further evaluation with an MRI of the lumbar spine with and without the use of IV contrast. Ultimately, the study may not be diagnostic either potentially yielding this lesion as indeterminate representing either an atypical hemangioma or a metastatic focus. However, this exam would serve as a useful baseline study for further follow-up. These findings and recommendations are discussed with Dr. Snell on 05/24/2021 at approximately 3:30 PM.  There is mild levoconvex scoliotic curvature of the thoracic spine.  Incidental tiny right central disc protrusion at T4-5.   This report was finalized on 5/24/2021 3:48 PM by Dr. Stu Oates M.D.      XR Chest 1 View    Result Date: 5/20/2021  New right Mediport catheter as described.  This report was finalized on 5/20/2021 4:05 PM by Dr. Jelani Santacruz M.D.      MRI Lumbar Spine With & Without Contrast    Result Date: 5/28/2021  1. There is a 6 x 5 x 6 mm rounded T1 hypointense, T2 hyperintense, faintly enhancing lesion in the left posterior body of the L1 vertebra. It is unchanged in size over the last 5 days since MRI of thoracic spine on 05/22/2021. This is an indeterminate vertebral body lesion and could be a benign hemangioma, although it could also be a tiny osseous-vertebral metastasis or myelomatous lesion. No additional osseous lesions are seen in the lumbar spine. The patient had a recent whole body PET-CT scan on 04/14/2021 and no discernible lytic or blastic abnormality was seen at this site on the CT images and no hypermetabolic activity was seen at the site which favors a benign hemangioma over a vertebral metastasis. Recommend  reevaluation of this tiny L1 vertebral lesion on a follow-up MRI of the lumbar spine. 2. There is mild lower lumbar spondylosis. At L4-L5, there is mild right and mild-to-moderate left facet overgrowth, mild disc space narrowing, posterior central small disc protrusion indents the anterior midline of the thecal sac mildly narrowing the central portion of the thecal sac, comes close to the anteromedial aspect of the traversing L5 nerve roots, but does not abut or compress them. There is minimal if any foraminal narrowing. 3. At L5-S1, there is moderate bilateral facet overgrowth, 5 mm degenerative anterolisthesis of L5 with respect to S1. There is some narrowing of the lateral recesses bilaterally, could compromise the traversing S1 nerve roots. There is loss of vertical height, foraminal bulging, disc material into the inferior right foramen, synovial thickening off the facets extending to the posterior foramen, and there is mild-to-moderate bilateral foraminal narrowing.  This report was finalized on 5/28/2021 6:59 AM by Dr. Raphael Kingston M.D.      US Axilla Bilateral    Result Date: 5/20/2021  There is no evidence for axillary adenopathy in either axilla. Normal-appearing bilateral axillary lymph nodes are visualized.  This report was finalized on 5/20/2021 2:13 PM by Dr. Jeffrey Quevedo M.D.       4/28/2021  CBC-WBC 9.84, hemoglobin 13.4, platelets 275    MRI of the brain 5/14/2021-images independently reviewed and interpreted by me in detail summarized in HPI  Bilateral axillary ultrasound 5/20/2021 images independently reviewed and interpreted by me in detail summarized in HPI    5/25/2021  CBC-WBC 13.33, hemoglobin 12.3, platelets 384,000  CMP-BUN 14, creatinine 0.76, alkaline phosphatase mildly elevated at 132    Assessment/Plan      *Bilateral breast cancer  · Invasive ductal carcinoma in both right and left breast and lesions measure under 1 cm.  No palpable lymphadenopathy although unsure if this has been  evaluated by an axillary ultrasound.  No abnormal axillary lymphadenopathy noted on PET.  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 Molina about delaying breast surgery and she is in agreement  We discussed starting neoadjuvant endocrine therapy with anastrozole 1 mg p.o. daily  · Patient experienced significant adverse effects with anastrozole  · Treatment changed to letrozole   · Axillary ultrasound 4/20/2021 - for any axillary lymphadenopathy  · Continue letrozole.    · 6/2/2021, the patient has started to experience arthralgias in her lower legs.  She uses tylenol occasionally to help with arthralgias, which she feels does control this.    *Adenocarcinoma of the left lung  · Most likely stage T1 N2 M0, stage III  · MRI of the thoracic spine ordered to further evaluate the abnormality seen on PET  · MRI of the brain negative   · Given that she is only 55 and fairly good performance status I discussed with her by with concurrent chemoradiation with cisplatin and Alimta every 3 weeks followed by durvalumab.  · Adverse effects including but not limited to myelosuppression, increased risk of infections, nausea, vomiting, renal dysfunction, ototoxicity, neurotoxicity have been discussed at length.  · She has been recommended to start folic acid and she will be administered B12 injection today.  · MRI of the thoracic spine performed 5/24/2021 shows no evidence of metastatic disease in the thoracic spine however in L1 there is a 7 mm lesion which is indeterminate.  A lumbar spine MRI has been recommended.  · Dr. Snell personally discussed her case with Dr. Oates and he feels like MRI of the lumbar spine may also show that this lesion is indeterminate.  · Discussed the same with the patient and her .  · Even if this is metastatic  disease this might be the only lesion of metastatic disease and then would consider this oligometastatic disease.  · Therefore plan to proceed with concurrent chemoradiation as scheduled.  · Labs reviewed and she is okay to proceed with cisplatin and Alimta, C1D1 5/25/2021  · Day 1 of radiation 5/25/2021  · MRI of the lumbar spine scheduled for 5/27/2021  · 6/2/2021, the patient returns today for 1 week toxicity check after the initiation of Cisplatin Alimta.  She continues with radiation, today she will be due for # 6/33.    She is tolerating treatment reasonably well.  She experienced one day of nausea, two days after treatment.  This was controlled with Zofran and the patient did not experience any vomiting.      *Bilateral breast cancer-no family history of breast cancer but given synchronous breast cancer genetic testing has been sent.  Genetic testing with a variant of unknown significance.    *Anxiety-she is on several different medications currently.  Referral has been made to supportive oncology clinic today.  She is requesting an increase in the dose of the Xanax.  Okay to increase the dose to 1 mg nightly to help with sleep.  Further management of these medications will be deferred to supportive oncology once she has established care with them.  He has been started on gabapentin.  Continue follow-up with supportive oncology, this is unchanged    *Left-sided chest wall pain-secondary to VATS.  Most likely neuropathic.  Continue gabapentin and Norco as needed.  Unchanged    *Tobacco use-she plans on quitting once she starts chemotherapy    *Hypertension-continue lisinopril.  Blood pressure 113/76.    PLAN:  1. Receive 1L IV fluids today  2. Return in 2 weeks for MD visit and cycle 2 of Cisplatin Alimta  3. Continue Letrozole 2.5 mg daily  4. Continue radiation under the care of Dr. Linares     The patient is on high risk medication that requires close monitoring for toxicity.    Brenda Herron,  APRN  06/02/2021

## 2021-06-03 ENCOUNTER — APPOINTMENT (OUTPATIENT)
Dept: MRI IMAGING | Facility: HOSPITAL | Age: 56
End: 2021-06-03

## 2021-06-03 PROCEDURE — 77014 CHG CT GUIDANCE RADIATION THERAPY FLDS PLACEMENT: CPT | Performed by: RADIOLOGY

## 2021-06-03 PROCEDURE — 77386: CPT | Performed by: RADIOLOGY

## 2021-06-03 PROCEDURE — 77386 CHG INTENSITY MODULATED RADIATION TX DLVR COMPLEX: CPT | Performed by: RADIOLOGY

## 2021-06-04 PROCEDURE — 77014 CHG CT GUIDANCE RADIATION THERAPY FLDS PLACEMENT: CPT | Performed by: RADIOLOGY

## 2021-06-04 PROCEDURE — 77386 CHG INTENSITY MODULATED RADIATION TX DLVR COMPLEX: CPT | Performed by: RADIOLOGY

## 2021-06-04 PROCEDURE — 77386: CPT | Performed by: RADIOLOGY

## 2021-06-07 PROCEDURE — 77014 CHG CT GUIDANCE RADIATION THERAPY FLDS PLACEMENT: CPT | Performed by: RADIOLOGY

## 2021-06-07 PROCEDURE — 77386: CPT | Performed by: RADIOLOGY

## 2021-06-07 PROCEDURE — 77386 CHG INTENSITY MODULATED RADIATION TX DLVR COMPLEX: CPT | Performed by: RADIOLOGY

## 2021-06-08 PROCEDURE — 77386 CHG INTENSITY MODULATED RADIATION TX DLVR COMPLEX: CPT | Performed by: RADIOLOGY

## 2021-06-08 PROCEDURE — 77336 RADIATION PHYSICS CONSULT: CPT | Performed by: RADIOLOGY

## 2021-06-08 PROCEDURE — 77386: CPT | Performed by: RADIOLOGY

## 2021-06-08 PROCEDURE — 77014 CHG CT GUIDANCE RADIATION THERAPY FLDS PLACEMENT: CPT | Performed by: RADIOLOGY

## 2021-06-09 ENCOUNTER — RADIATION ONCOLOGY WEEKLY ASSESSMENT (OUTPATIENT)
Dept: RADIATION ONCOLOGY | Facility: HOSPITAL | Age: 56
End: 2021-06-09

## 2021-06-09 DIAGNOSIS — C34.12 PRIMARY ADENOCARCINOMA OF UPPER LOBE OF LEFT LUNG (HCC): Primary | ICD-10-CM

## 2021-06-09 PROCEDURE — 77427 RADIATION TX MANAGEMENT X5: CPT | Performed by: RADIOLOGY

## 2021-06-09 PROCEDURE — 77386 CHG INTENSITY MODULATED RADIATION TX DLVR COMPLEX: CPT | Performed by: RADIOLOGY

## 2021-06-09 PROCEDURE — 77014 CHG CT GUIDANCE RADIATION THERAPY FLDS PLACEMENT: CPT | Performed by: RADIOLOGY

## 2021-06-09 PROCEDURE — 77386: CPT | Performed by: RADIOLOGY

## 2021-06-09 NOTE — PROGRESS NOTES
Physician Weekly Management Note    Diagnosis:    1. Primary adenocarcinoma of upper lobe of left lung (CMS/HCC)       cT1, cN2, cM0    Reason for Visit:  Weekly Status Check (11/33)    Concurrent Chemo:   Yes    Notes on Treatment course, Films, Medical progress and Plan:  Doing really well. Fatigue better - eating and swallowing well. Looks great, mood good. Cont on.    Performance Status: (1) Restricted in physically strenuous activity, ambulatory and able to do work of light nature  Subjective     ROS - Other than as listed above, as follows:  Constitutional - Normal - no complaints of fatigue, denies lack of appetite, fever, night sweats or change in weight.  Neck - Normal - denies neck masses, muscle weakness, neck pain, decreased range of motion or swelling.  Cardiovascular - Normal - denies arrhythmias, chest pain, dyspnea, edema, orthopnea or palpitations.  Respiratory - Normal - denies cough, dyspnea, hemoptysis, hiccoughs, pleuritic chest pain or wheezing.  Gastrointestinal - Normal - no complaints of constipation, abdominal pain, diarrhea, heartburn/dyspepsia, hematemesis, hemorrhoids, melena or GI bleeding, nausea, pain or cramping or vomiting.     There were no vitals filed for this visit.  Objective   Patient screened positive for depression based on a PHQ-9 score of 9 on 5/26/2021. Follow-up recommendations include: Referral to Mental Health specialist.    PHYSICAL EXAM - Other than listed above, as follows:  Constitutional - Normal - no evidence of impaired alertness, inadequate appearance, premature or advanced chronologic age, uncooperativeness, altered mood, affect or disorientation.  Neck - Normal - no evidence of tender or enlarged lymph nodes, neck abnormalities, restricted range of motion or enlarged thyroid.  Chest - Normal - no evidence of chest abnormalities, tender or enlarged lymph nodes.  Respiratory - Normal - no evidence of abnormal breat sounds, chest abnormalities on palpation and  "chest abnormalities on percussion and normal breath sounds.  Hematologic/Lymphatic - Normal - no evidence of tender or enlarged axillary lymph nodes nor tender or enlarged neck nodes.    Technical aspects reviewed:  Weekly OBI approved if applicable?  Yes  Weekly port films approved?  Yes  Change requests noted if applicable?  Yes  Patient setup and plan reviewed?  Yes    Chart Reviewed:  Continue current treatment plan?  Yes  Treatment plan change requested?  No    I have reviewed and marked as \"reviewed\" the current medications, allergies and problem list in the patients EMR.  I have reviewed the patient's medical, surgical  history in detail, reviewed any pertinent lab work  and updated the computerized patient record if needed.    Patient's Care Team:  Patient Care Team:  Vandana Gastelum MD as PCP - General (Family Medicine)  Vandana Gastelum MD as Referring Physician (Family Medicine)  Shell Thomson MD as Consulting Physician (Radiation Oncology)  "

## 2021-06-10 PROCEDURE — 77014 CHG CT GUIDANCE RADIATION THERAPY FLDS PLACEMENT: CPT | Performed by: RADIOLOGY

## 2021-06-10 PROCEDURE — 77386 CHG INTENSITY MODULATED RADIATION TX DLVR COMPLEX: CPT | Performed by: RADIOLOGY

## 2021-06-10 PROCEDURE — 77386: CPT | Performed by: RADIOLOGY

## 2021-06-11 PROCEDURE — 77386: CPT | Performed by: RADIOLOGY

## 2021-06-11 PROCEDURE — 77014 CHG CT GUIDANCE RADIATION THERAPY FLDS PLACEMENT: CPT | Performed by: RADIOLOGY

## 2021-06-11 PROCEDURE — 77386 CHG INTENSITY MODULATED RADIATION TX DLVR COMPLEX: CPT | Performed by: RADIOLOGY

## 2021-06-14 ENCOUNTER — RADIATION ONCOLOGY WEEKLY ASSESSMENT (OUTPATIENT)
Dept: RADIATION ONCOLOGY | Facility: HOSPITAL | Age: 56
End: 2021-06-14

## 2021-06-14 DIAGNOSIS — C34.12 PRIMARY ADENOCARCINOMA OF UPPER LOBE OF LEFT LUNG (HCC): Primary | ICD-10-CM

## 2021-06-14 PROCEDURE — 77386: CPT | Performed by: RADIOLOGY

## 2021-06-14 PROCEDURE — 77386 CHG INTENSITY MODULATED RADIATION TX DLVR COMPLEX: CPT | Performed by: RADIOLOGY

## 2021-06-14 PROCEDURE — 77014 CHG CT GUIDANCE RADIATION THERAPY FLDS PLACEMENT: CPT | Performed by: RADIOLOGY

## 2021-06-14 NOTE — PROGRESS NOTES
Physician Weekly Management Note    Diagnosis:    1. Primary adenocarcinoma of upper lobe of left lung (CMS/HCC)     cT1, cN2, cM0    Reason for Visit:  Weekly Status Check (14/33)    Concurrent Chemo:   Yes    Notes on Treatment course, Films, Medical progress and Plan:  Doing well. Some gastric gas/bloating. Discussed - try GasX if it recurs. No N/V. Skin fine. No problems or questions, cont on.    Performance Status: (1) Restricted in physically strenuous activity, ambulatory and able to do work of light nature  Subjective     ROS - Other than as listed above, as follows:  Constitutional - Normal - no complaints of fatigue, denies lack of appetite, fever, night sweats or change in weight.  Neck - Normal - denies neck masses, muscle weakness, neck pain, decreased range of motion or swelling.  Cardiovascular - Normal - denies arrhythmias, chest pain, dyspnea, edema, orthopnea or palpitations.  Respiratory - Normal - denies cough, dyspnea, hemoptysis, hiccoughs, pleuritic chest pain or wheezing.  Gastrointestinal - Normal - no complaints of constipation, abdominal pain, diarrhea, heartburn/dyspepsia, hematemesis, hemorrhoids, melena or GI bleeding, nausea, pain or cramping or vomiting.     There were no vitals filed for this visit.  Objective     PHYSICAL EXAM - Other than listed above, as follows:  Constitutional - Normal - no evidence of impaired alertness, inadequate appearance, premature or advanced chronologic age, uncooperativeness, altered mood, affect or disorientation.  Neck - Normal - no evidence of tender or enlarged lymph nodes, neck abnormalities, restricted range of motion or enlarged thyroid.  Chest - Normal - no evidence of chest abnormalities, tender or enlarged lymph nodes.  Respiratory - Normal - no evidence of abnormal breat sounds, chest abnormalities on palpation and chest abnormalities on percussion and normal breath sounds.  Hematologic/Lymphatic - Normal - no evidence of tender or enlarged  "axillary lymph nodes nor tender or enlarged neck nodes.    Technical aspects reviewed:  Weekly OBI approved if applicable?  Yes  Weekly port films approved?  Yes  Change requests noted if applicable?  Yes  Patient setup and plan reviewed?  Yes    Chart Reviewed:  Continue current treatment plan?  Yes  Treatment plan change requested?  No    I have reviewed and marked as \"reviewed\" the current medications, allergies and problem list in the patients EMR.  I have reviewed the patient's medical, surgical  history in detail, reviewed any pertinent lab work  and updated the computerized patient record if needed.    Patient's Care Team:  Patient Care Team:  Vandana Gastelum MD as PCP - General (Family Medicine)  Vandana Gastelum MD as Referring Physician (Family Medicine)  Shell Thomson MD as Consulting Physician (Radiation Oncology)  "

## 2021-06-15 ENCOUNTER — INFUSION (OUTPATIENT)
Dept: ONCOLOGY | Facility: HOSPITAL | Age: 56
End: 2021-06-15

## 2021-06-15 ENCOUNTER — OFFICE VISIT (OUTPATIENT)
Dept: ONCOLOGY | Facility: CLINIC | Age: 56
End: 2021-06-15

## 2021-06-15 VITALS
HEART RATE: 77 BPM | BODY MASS INDEX: 31.72 KG/M2 | DIASTOLIC BLOOD PRESSURE: 88 MMHG | SYSTOLIC BLOOD PRESSURE: 142 MMHG | WEIGHT: 179 LBS

## 2021-06-15 VITALS
TEMPERATURE: 97.3 F | RESPIRATION RATE: 18 BRPM | OXYGEN SATURATION: 94 % | HEART RATE: 81 BPM | SYSTOLIC BLOOD PRESSURE: 137 MMHG | BODY MASS INDEX: 30.72 KG/M2 | WEIGHT: 173.4 LBS | HEIGHT: 63 IN | DIASTOLIC BLOOD PRESSURE: 88 MMHG

## 2021-06-15 DIAGNOSIS — C34.12 PRIMARY ADENOCARCINOMA OF UPPER LOBE OF LEFT LUNG (HCC): Primary | ICD-10-CM

## 2021-06-15 DIAGNOSIS — C34.12 PRIMARY ADENOCARCINOMA OF UPPER LOBE OF LEFT LUNG (HCC): ICD-10-CM

## 2021-06-15 LAB
ALBUMIN SERPL-MCNC: 3.9 G/DL (ref 3.5–5.2)
ALBUMIN/GLOB SERPL: 1.2 G/DL (ref 1.1–2.4)
ALP SERPL-CCNC: 121 U/L (ref 38–116)
ALT SERPL W P-5'-P-CCNC: 10 U/L (ref 0–33)
ANION GAP SERPL CALCULATED.3IONS-SCNC: 12.1 MMOL/L (ref 5–15)
AST SERPL-CCNC: 13 U/L (ref 0–32)
BASOPHILS # BLD AUTO: 0.12 10*3/MM3 (ref 0–0.2)
BASOPHILS NFR BLD AUTO: 1.7 % (ref 0–1.5)
BILIRUB SERPL-MCNC: <0.2 MG/DL (ref 0.2–1.2)
BUN SERPL-MCNC: 12 MG/DL (ref 6–20)
BUN/CREAT SERPL: 12 (ref 7.3–30)
CALCIUM SPEC-SCNC: 9.2 MG/DL (ref 8.5–10.2)
CHLORIDE SERPL-SCNC: 103 MMOL/L (ref 98–107)
CO2 SERPL-SCNC: 25.9 MMOL/L (ref 22–29)
CREAT SERPL-MCNC: 1 MG/DL (ref 0.6–1.1)
DEPRECATED RDW RBC AUTO: 46.3 FL (ref 37–54)
EOSINOPHIL # BLD AUTO: 0.27 10*3/MM3 (ref 0–0.4)
EOSINOPHIL NFR BLD AUTO: 3.9 % (ref 0.3–6.2)
ERYTHROCYTE [DISTWIDTH] IN BLOOD BY AUTOMATED COUNT: 14.6 % (ref 12.3–15.4)
GFR SERPL CREATININE-BSD FRML MDRD: 57 ML/MIN/1.73
GLOBULIN UR ELPH-MCNC: 3.2 GM/DL (ref 1.8–3.5)
GLUCOSE SERPL-MCNC: 109 MG/DL (ref 74–124)
HCT VFR BLD AUTO: 38 % (ref 34–46.6)
HGB BLD-MCNC: 11.7 G/DL (ref 12–15.9)
IMM GRANULOCYTES # BLD AUTO: 0.22 10*3/MM3 (ref 0–0.05)
IMM GRANULOCYTES NFR BLD AUTO: 3.2 % (ref 0–0.5)
LYMPHOCYTES # BLD AUTO: 1.66 10*3/MM3 (ref 0.7–3.1)
LYMPHOCYTES NFR BLD AUTO: 23.9 % (ref 19.6–45.3)
MAGNESIUM SERPL-MCNC: 1.7 MG/DL (ref 1.8–2.5)
MCH RBC QN AUTO: 26.9 PG (ref 26.6–33)
MCHC RBC AUTO-ENTMCNC: 30.8 G/DL (ref 31.5–35.7)
MCV RBC AUTO: 87.4 FL (ref 79–97)
MONOCYTES # BLD AUTO: 1.22 10*3/MM3 (ref 0.1–0.9)
MONOCYTES NFR BLD AUTO: 17.6 % (ref 5–12)
NEUTROPHILS NFR BLD AUTO: 3.45 10*3/MM3 (ref 1.7–7)
NEUTROPHILS NFR BLD AUTO: 49.7 % (ref 42.7–76)
NRBC BLD AUTO-RTO: 0 /100 WBC (ref 0–0.2)
PLATELET # BLD AUTO: 459 10*3/MM3 (ref 140–450)
PMV BLD AUTO: 8.9 FL (ref 6–12)
POTASSIUM SERPL-SCNC: 4.5 MMOL/L (ref 3.5–4.7)
PROT SERPL-MCNC: 7.1 G/DL (ref 6.3–8)
RBC # BLD AUTO: 4.35 10*6/MM3 (ref 3.77–5.28)
SODIUM SERPL-SCNC: 141 MMOL/L (ref 134–145)
WBC # BLD AUTO: 6.94 10*3/MM3 (ref 3.4–10.8)

## 2021-06-15 PROCEDURE — 25010000002 POTASSIUM CHLORIDE PER 2 MEQ OF POTASSIUM: Performed by: INTERNAL MEDICINE

## 2021-06-15 PROCEDURE — 96415 CHEMO IV INFUSION ADDL HR: CPT

## 2021-06-15 PROCEDURE — 96411 CHEMO IV PUSH ADDL DRUG: CPT

## 2021-06-15 PROCEDURE — 99214 OFFICE O/P EST MOD 30 MIN: CPT | Performed by: INTERNAL MEDICINE

## 2021-06-15 PROCEDURE — 77014 CHG CT GUIDANCE RADIATION THERAPY FLDS PLACEMENT: CPT | Performed by: RADIOLOGY

## 2021-06-15 PROCEDURE — 25010000002 CISPLATIN PER 50 MG: Performed by: INTERNAL MEDICINE

## 2021-06-15 PROCEDURE — 85025 COMPLETE CBC W/AUTO DIFF WBC: CPT

## 2021-06-15 PROCEDURE — 77336 RADIATION PHYSICS CONSULT: CPT | Performed by: RADIOLOGY

## 2021-06-15 PROCEDURE — 36591 DRAW BLOOD OFF VENOUS DEVICE: CPT

## 2021-06-15 PROCEDURE — 96413 CHEMO IV INFUSION 1 HR: CPT

## 2021-06-15 PROCEDURE — 25010000002 DEXAMETHASONE SODIUM PHOSPHATE 100 MG/10ML SOLUTION: Performed by: INTERNAL MEDICINE

## 2021-06-15 PROCEDURE — 25010000002 MAGNESIUM SULFATE PER 500 MG OF MAGNESIUM: Performed by: INTERNAL MEDICINE

## 2021-06-15 PROCEDURE — 25010000002 PALONOSETRON PER 25 MCG: Performed by: INTERNAL MEDICINE

## 2021-06-15 PROCEDURE — 96366 THER/PROPH/DIAG IV INF ADDON: CPT

## 2021-06-15 PROCEDURE — 25010000002 PEMETREXED PER 10 MG: Performed by: INTERNAL MEDICINE

## 2021-06-15 PROCEDURE — 96375 TX/PRO/DX INJ NEW DRUG ADDON: CPT

## 2021-06-15 PROCEDURE — 96367 TX/PROPH/DG ADDL SEQ IV INF: CPT

## 2021-06-15 PROCEDURE — 80053 COMPREHEN METABOLIC PANEL: CPT

## 2021-06-15 PROCEDURE — 83735 ASSAY OF MAGNESIUM: CPT

## 2021-06-15 PROCEDURE — 25010000002 FOSAPREPITANT PER 1 MG: Performed by: INTERNAL MEDICINE

## 2021-06-15 PROCEDURE — 77386: CPT | Performed by: RADIOLOGY

## 2021-06-15 PROCEDURE — 77386 CHG INTENSITY MODULATED RADIATION TX DLVR COMPLEX: CPT | Performed by: RADIOLOGY

## 2021-06-15 RX ORDER — SODIUM CHLORIDE 9 MG/ML
250 INJECTION, SOLUTION INTRAVENOUS ONCE
Status: COMPLETED | OUTPATIENT
Start: 2021-06-15 | End: 2021-06-15

## 2021-06-15 RX ORDER — PALONOSETRON 0.05 MG/ML
0.25 INJECTION, SOLUTION INTRAVENOUS ONCE
Status: CANCELLED | OUTPATIENT
Start: 2021-06-15

## 2021-06-15 RX ORDER — AMOXICILLIN 250 MG
2 CAPSULE ORAL DAILY
Qty: 60 TABLET | Refills: 1 | Status: SHIPPED | OUTPATIENT
Start: 2021-06-15 | End: 2021-08-02

## 2021-06-15 RX ORDER — PALONOSETRON 0.05 MG/ML
0.25 INJECTION, SOLUTION INTRAVENOUS ONCE
Status: COMPLETED | OUTPATIENT
Start: 2021-06-15 | End: 2021-06-15

## 2021-06-15 RX ORDER — OMEPRAZOLE 20 MG/1
20 CAPSULE, DELAYED RELEASE ORAL 2 TIMES DAILY
Qty: 60 CAPSULE | Refills: 1 | Status: SHIPPED | OUTPATIENT
Start: 2021-06-15 | End: 2021-07-27 | Stop reason: SDUPTHER

## 2021-06-15 RX ORDER — SODIUM CHLORIDE 9 MG/ML
250 INJECTION, SOLUTION INTRAVENOUS ONCE
Status: CANCELLED | OUTPATIENT
Start: 2021-06-15

## 2021-06-15 RX ORDER — ACETAMINOPHEN 325 MG/1
650 TABLET ORAL EVERY 6 HOURS PRN
Status: DISCONTINUED | OUTPATIENT
Start: 2021-06-15 | End: 2021-06-15 | Stop reason: HOSPADM

## 2021-06-15 RX ADMIN — SODIUM CHLORIDE 250 ML: 9 INJECTION, SOLUTION INTRAVENOUS at 10:22

## 2021-06-15 RX ADMIN — CISPLATIN 136 MG: 1 INJECTION, SOLUTION INTRAVENOUS at 11:51

## 2021-06-15 RX ADMIN — PALONOSETRON 0.25 MG: 0.05 INJECTION, SOLUTION INTRAVENOUS at 10:44

## 2021-06-15 RX ADMIN — POTASSIUM CHLORIDE 1000 ML: 2 INJECTION, SOLUTION, CONCENTRATE INTRAVENOUS at 14:34

## 2021-06-15 RX ADMIN — ACETAMINOPHEN 650 MG: 325 TABLET ORAL at 10:22

## 2021-06-15 RX ADMIN — SODIUM CHLORIDE 910 MG: 9 INJECTION, SOLUTION INTRAVENOUS at 11:03

## 2021-06-15 RX ADMIN — POTASSIUM CHLORIDE 1000 ML: 2 INJECTION, SOLUTION, CONCENTRATE INTRAVENOUS at 08:42

## 2021-06-15 RX ADMIN — DEXAMETHASONE SODIUM PHOSPHATE 12 MG: 10 INJECTION, SOLUTION INTRAMUSCULAR; INTRAVENOUS at 10:46

## 2021-06-15 RX ADMIN — SODIUM CHLORIDE 100 ML: 9 INJECTION, SOLUTION INTRAVENOUS at 11:15

## 2021-06-15 NOTE — PROGRESS NOTES
Subjective   Holli Patel is a 55 y.o. female.  Referred by Dr. 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    3/1/2021-bilateral screening mammogram-microcalcifications seen in the posterior one third of the lateral aspect of the right breast.  Area of focal asymmetry seen in the middle one third of the upper inner quadrant of the left breast.    3/1/2021-DEXA scan-T score of -2.2 on the lumbar spine and T score of -2.3 in the left hip and T score of -1.9 in the right hip.  Findings consistent with osteopenia    3/23/2021-screening lung CT-there is a 10 x 11 mm solid nodule in the left upper lobe.  Enlarged AP window lymph node measuring 14 x 10 mm.  Suggestion of a possible 9 mm left hilar lymph node.  Heavy coronary artery calcification.    3/23/2021-diagnostic mammogram bilateral-cluster of microcalcifications in the middle third lateral aspect of the right breast.  Left breast demonstrates persistence of the area of focal asymmetry in the region.    Ultrasound-left breast ultrasound at 10 o'clock position, 6 cm from the nipple there is a 0.4 cm irregular hypoechoic lesion.  Stereotactic biopsy of the right breast calcifications recommended.  Ultrasound-guided biopsy of the left breast lesion recommended    4/7/2021-right breast ear tactic biopsy and left breast ultrasound-guided biopsy  Pathology  Right breast-invasive ductal carcinoma, grade 2, lymphovascular invasion present, ER +99% strong, MT +80% moderate, HER-2 negative, Ki-67 12%  Left breast upper inner quadrant 10 o'clock position biopsy, invasive ductal carcinoma, grade   2, ER +99% strong, MT +40% strong, HER-2 2+ on immunohistochemistry, nonamplified on FISH Ki-67 10%    4/14/2021-PET/CT-FDG avid aortopulmonary window lymph node measures 0.9 cm with an SUV of 7.5.  FDG avid left hilar lymph node  measures 1 cm with an SUV of 17.2.  Irregular soft tissue density in the right breast measuring 3.7 x 3.8 cm is favored to be secondary to the recent breast biopsy.  1.1 cm pulmonary nodule within the left upper lobe with SUV 9.7 .  Sub-6 mm pulmonary nodules are present in the right lower lobe.  No evidence of lymphadenopathy or metastatic disease in the abdomen.  Focal area of FDG uptake within the central canal posterior to T11-T12 displaced demonstrating an SUV of 5.5 thought to be reactive however MRI is recommended for further evaluation.    She was seen by Dr. Molina who initially planned for breast surgery however  due to the PET abnormalities she has been referred to Dr. Kerr who plans to do a wound on 4/30/2021.  Dr. Molina's and Dr. Kerr's notes reviewed.    Patient denies any family history of breast cancer.  Her mother had lymphoma.  Maternal grandmother had colon cancer.  Prior to that screening mammogram she did not have any palpable abnormalities of either breast.    She has been a heavy smoker for about 40 years and smoked 2 packs/day.  Denies any recent weight changes, new bone pains, cough, abdominal pain nausea vomiting constipation or diarrhea.  She does have anxiety and has been on several medications.  She has recently been started on Xanax to help with the mood and also with insomnia.    Patient had a video-assisted thoracoscopy and mediastinal lymphadenectomy on 4/30/2021.  L5-L6 lymph nodes were biopsied however the small pulmonary nodule in the left upper lobe was not difficult to find.  Pathology showed moderately differentiated adenocarcinoma which is CK7 and TTF-1 positive.  Consistent with lung primary.  Ki-67 85%.    5/14/2021-MRI of the brain-minimal chronic small vessel ischemic change in the white matter.  Otherwise negative MRI.    5/20/2021-bilateral axillary ultrasound-no evidence of axillary lymphadenopathy in either axilla.  Normal-appearing bilateral axillary lymph  nodes visualized.    Cycle 1 cisplatin and Alimta on 5/25/2021.    Interval history  Patient presents to the clinic today for follow-up.  She is due for cycle 2 of cisplatin and Alimta.  She has overall tolerated treatment fairly well.  Had significant amount of fatigue.  She also had some mucositis which resolved without use of any mouthwash.  She is complaining of numbness as well as bloating sensation in the left upper quadrant of the abdomen.  She reports that this started from the time of surgery particularly the numbness and the bloating has started after radiation.  She discussed this with Dr. Thomson who thought that this was related to radiation.  She has been using some Gas-X since yesterday.  Reports a good appetite and good oral intake    The following portions of the patient's history were reviewed and updated as appropriate: allergies, current medications, past family history, past medical history, past social history, past surgical history and problem list.    Past Medical History:   Diagnosis Date   • Anxiety    • Dyspnea on exertion    • SHAYY (generalized anxiety disorder)     RELATED HIGH BLOOD PRESSURE   • GERD (gastroesophageal reflux disease)    • High blood pressure     ANXIETY RELATED   • Hyperlipidemia    • Hypothyroidism    • Insomnia    • Lung cancer (CMS/HCC)    • Lung nodule    • Malignant neoplasm of upper-outer quadrant of right breast in female, estrogen receptor positive (CMS/HCC) 4/13/2021   • Migraine    • PONV (postoperative nausea and vomiting)         Past Surgical History:   Procedure Laterality Date   • BREAST BIOPSY Bilateral 04/07/2021    Right Breast 10 o'clock & Left breast 10 o'clock 6 cm from nipple, [facility]   • CLOSED REDUCTION HIP DISLOCATION Left 4/30/2021    Procedure: BRONCHOSCOPY, LEFT VIDEO ASSITED THORACOSCOPY, ROBOTIC ASSSITED MEDIASTINAL LYMPHADENECTOMY WITH INTERCOSTAL NERVE BLOCKS;  Surgeon: Raphael Kerr III, MD;  Location: [facility];  Service: [facility];   "Laterality: Left;   • COLONOSCOPY     • VENOUS ACCESS DEVICE (PORT) INSERTION Right 2021    Procedure: INSERTION VENOUS ACCESS DEVICE;  Surgeon: Raphael Kerr III, MD;  Location: [facility] OR;  Service: Thoracic;  Laterality: Right;   • WRIST SURGERY Right         Family History   Problem Relation Age of Onset   • Cancer Father         LYMPHATIC   • Alcohol abuse Brother    • Colon cancer Maternal Grandmother    • Malig Hyperthermia Neg Hx         Social History     Socioeconomic History   • Marital status:      Spouse name: Not on file   • Number of children: Not on file   • Years of education: Not on file   • Highest education level: Not on file   Tobacco Use   • Smoking status: Current Every Day Smoker     Packs/day: 1.00     Types: Electronic Cigarette, Cigarettes     Start date: 1981   • Smokeless tobacco: Never Used   • Tobacco comment: ABT 15 CIGARETTES DAILY   Vaping Use   • Vaping Use: Never used   Substance and Sexual Activity   • Alcohol use: Never   • Drug use: Never   • Sexual activity: Yes        OB History        2    Para   2    Term   2            AB        Living           SAB        TAB        Ectopic        Molar        Multiple        Live Births                Age of menarche-10  Age at menopause-42  Oral contraceptive pill use-15 years  No HRT use  She has 2 children  Age at first live childbirth-17    No Known Allergies     Review of Systems   Review of systems as mentioned in the HPI    Objective   Blood pressure 137/88, pulse 81, temperature 97.3 °F (36.3 °C), temperature source Temporal, resp. rate 18, height 160 cm (62.99\"), weight 78.7 kg (173 lb 6.4 oz), SpO2 94 %, not currently breastfeeding.   Physical Exam  Vitals reviewed.   Constitutional:       Appearance: She is obese.   HENT:      Head: Normocephalic.      Nose: Nose normal.      Mouth/Throat:      Mouth: Mucous membranes are moist.      Pharynx: Oropharynx is clear.   Eyes:      Extraocular " Movements: Extraocular movements intact.      Conjunctiva/sclera: Conjunctivae normal.      Pupils: Pupils are equal, round, and reactive to light.   Cardiovascular:      Rate and Rhythm: Normal rate and regular rhythm.      Pulses: Normal pulses.      Heart sounds: Normal heart sounds.   Pulmonary:      Effort: Pulmonary effort is normal.      Breath sounds: Normal breath sounds.   Abdominal:      General: Abdomen is flat. Bowel sounds are normal. There is no distension.      Palpations: There is no mass.   Musculoskeletal:         General: Normal range of motion.      Cervical back: Normal range of motion.   Skin:     General: Skin is warm.   Neurological:      General: No focal deficit present.      Mental Status: She is alert and oriented to person, place, and time.   Psychiatric:         Mood and Affect: Mood normal.         Behavior: Behavior normal.         Thought Content: Thought content normal.         Judgment: Judgment normal.     Breast Exam: Right breast with a large postbiopsy hematoma in the upper outer quadrant measuring 4 cm.  Visible ecchymosis.  No other palpable abnormalities.  Left breast with no palpable abnormalities.  No right or left axillary lymphadenopathy    I have reexamined the patient and the results are consistent with the previously documented exam. Salma Snell MD     Infusion on 06/15/2021   Component Date Value Ref Range Status   • WBC 06/15/2021 6.94  3.40 - 10.80 10*3/mm3 Final   • RBC 06/15/2021 4.35  3.77 - 5.28 10*6/mm3 Final   • Hemoglobin 06/15/2021 11.7* 12.0 - 15.9 g/dL Final   • Hematocrit 06/15/2021 38.0  34.0 - 46.6 % Final   • MCV 06/15/2021 87.4  79.0 - 97.0 fL Final   • MCH 06/15/2021 26.9  26.6 - 33.0 pg Final   • MCHC 06/15/2021 30.8* 31.5 - 35.7 g/dL Final   • RDW 06/15/2021 14.6  12.3 - 15.4 % Final   • RDW-SD 06/15/2021 46.3  37.0 - 54.0 fl Final   • MPV 06/15/2021 8.9  6.0 - 12.0 fL Final   • Platelets 06/15/2021 459* 140 - 450 10*3/mm3 Final   •  Neutrophil % 06/15/2021 49.7  42.7 - 76.0 % Final   • Lymphocyte % 06/15/2021 23.9  19.6 - 45.3 % Final   • Monocyte % 06/15/2021 17.6* 5.0 - 12.0 % Final   • Eosinophil % 06/15/2021 3.9  0.3 - 6.2 % Final   • Basophil % 06/15/2021 1.7* 0.0 - 1.5 % Final   • Immature Grans % 06/15/2021 3.2* 0.0 - 0.5 % Final   • Neutrophils, Absolute 06/15/2021 3.45  1.70 - 7.00 10*3/mm3 Final   • Lymphocytes, Absolute 06/15/2021 1.66  0.70 - 3.10 10*3/mm3 Final   • Monocytes, Absolute 06/15/2021 1.22* 0.10 - 0.90 10*3/mm3 Final   • Eosinophils, Absolute 06/15/2021 0.27  0.00 - 0.40 10*3/mm3 Final   • Basophils, Absolute 06/15/2021 0.12  0.00 - 0.20 10*3/mm3 Final   • Immature Grans, Absolute 06/15/2021 0.22* 0.00 - 0.05 10*3/mm3 Final   • nRBC 06/15/2021 0.0  0.0 - 0.2 /100 WBC Final   Infusion on 06/02/2021   Component Date Value Ref Range Status   • Glucose 06/02/2021 95  74 - 124 mg/dL Final   • BUN 06/02/2021 15  6 - 20 mg/dL Final   • Creatinine 06/02/2021 1.10  0.60 - 1.10 mg/dL Final   • Sodium 06/02/2021 141  134 - 145 mmol/L Final   • Potassium 06/02/2021 4.6  3.5 - 4.7 mmol/L Final   • Chloride 06/02/2021 103  98 - 107 mmol/L Final   • CO2 06/02/2021 27.1  22.0 - 29.0 mmol/L Final   • Calcium 06/02/2021 9.2  8.5 - 10.2 mg/dL Final   • Total Protein 06/02/2021 6.7  6.3 - 8.0 g/dL Final   • Albumin 06/02/2021 3.80  3.50 - 5.20 g/dL Final   • ALT (SGPT) 06/02/2021 18  0 - 33 U/L Final   • AST (SGOT) 06/02/2021 13  0 - 32 U/L Final   • Alkaline Phosphatase 06/02/2021 107  38 - 116 U/L Final   • Total Bilirubin 06/02/2021 <0.2* 0.2 - 1.2 mg/dL Final   • eGFR Non African Amer 06/02/2021 51* >60 mL/min/1.73 Final   • Globulin 06/02/2021 2.9  1.8 - 3.5 gm/dL Final   • A/G Ratio 06/02/2021 1.3  1.1 - 2.4 g/dL Final   • BUN/Creatinine Ratio 06/02/2021 13.6  7.3 - 30.0 Final   • Anion Gap 06/02/2021 10.9  5.0 - 15.0 mmol/L Final   • WBC 06/02/2021 6.26  3.40 - 10.80 10*3/mm3 Final   • RBC 06/02/2021 4.52  3.77 - 5.28 10*6/mm3 Final    • Hemoglobin 06/02/2021 12.0  12.0 - 15.9 g/dL Final   • Hematocrit 06/02/2021 38.9  34.0 - 46.6 % Final   • MCV 06/02/2021 86.1  79.0 - 97.0 fL Final   • MCH 06/02/2021 26.5* 26.6 - 33.0 pg Final   • MCHC 06/02/2021 30.8* 31.5 - 35.7 g/dL Final   • RDW 06/02/2021 13.7  12.3 - 15.4 % Final   • RDW-SD 06/02/2021 43.0  37.0 - 54.0 fl Final   • MPV 06/02/2021 9.3  6.0 - 12.0 fL Final   • Platelets 06/02/2021 244  140 - 450 10*3/mm3 Final   • Neutrophil % 06/02/2021 57.0  42.7 - 76.0 % Final   • Lymphocyte % 06/02/2021 27.0  19.6 - 45.3 % Final   • Monocyte % 06/02/2021 11.7  5.0 - 12.0 % Final   • Eosinophil % 06/02/2021 3.2  0.3 - 6.2 % Final   • Basophil % 06/02/2021 0.3  0.0 - 1.5 % Final   • Immature Grans % 06/02/2021 0.8* 0.0 - 0.5 % Final   • Neutrophils, Absolute 06/02/2021 3.57  1.70 - 7.00 10*3/mm3 Final   • Lymphocytes, Absolute 06/02/2021 1.69  0.70 - 3.10 10*3/mm3 Final   • Monocytes, Absolute 06/02/2021 0.73  0.10 - 0.90 10*3/mm3 Final   • Eosinophils, Absolute 06/02/2021 0.20  0.00 - 0.40 10*3/mm3 Final   • Basophils, Absolute 06/02/2021 0.02  0.00 - 0.20 10*3/mm3 Final   • Immature Grans, Absolute 06/02/2021 0.05  0.00 - 0.05 10*3/mm3 Final   • nRBC 06/02/2021 0.0  0.0 - 0.2 /100 WBC Final   Infusion on 05/25/2021   Component Date Value Ref Range Status   • Glucose 05/25/2021 125* 74 - 124 mg/dL Final   • BUN 05/25/2021 14  6 - 20 mg/dL Final   • Creatinine 05/25/2021 0.76  0.60 - 1.10 mg/dL Final   • Sodium 05/25/2021 139  134 - 145 mmol/L Final   • Potassium 05/25/2021 4.5  3.5 - 4.7 mmol/L Final   • Chloride 05/25/2021 101  98 - 107 mmol/L Final   • CO2 05/25/2021 27.1  22.0 - 29.0 mmol/L Final   • Calcium 05/25/2021 9.9  8.5 - 10.2 mg/dL Final   • Total Protein 05/25/2021 7.4  6.3 - 8.0 g/dL Final   • Albumin 05/25/2021 4.20  3.50 - 5.20 g/dL Final   • ALT (SGPT) 05/25/2021 14  0 - 33 U/L Final   • AST (SGOT) 05/25/2021 12  0 - 32 U/L Final   • Alkaline Phosphatase 05/25/2021 132* 38 - 116 U/L  Final   • Total Bilirubin 05/25/2021 <0.2* 0.2 - 1.2 mg/dL Final   • eGFR Non African Amer 05/25/2021 78  >60 mL/min/1.73 Final   • Globulin 05/25/2021 3.2  1.8 - 3.5 gm/dL Final   • A/G Ratio 05/25/2021 1.3  1.1 - 2.4 g/dL Final   • BUN/Creatinine Ratio 05/25/2021 18.4  7.3 - 30.0 Final   • Anion Gap 05/25/2021 10.9  5.0 - 15.0 mmol/L Final   • Magnesium 05/25/2021 1.7* 1.8 - 2.5 mg/dL Final   • WBC 05/25/2021 13.33* 3.40 - 10.80 10*3/mm3 Final   • RBC 05/25/2021 4.62  3.77 - 5.28 10*6/mm3 Final   • Hemoglobin 05/25/2021 12.3  12.0 - 15.9 g/dL Final   • Hematocrit 05/25/2021 39.2  34.0 - 46.6 % Final   • MCV 05/25/2021 84.8  79.0 - 97.0 fL Final   • MCH 05/25/2021 26.6  26.6 - 33.0 pg Final   • MCHC 05/25/2021 31.4* 31.5 - 35.7 g/dL Final   • RDW 05/25/2021 13.6  12.3 - 15.4 % Final   • RDW-SD 05/25/2021 42.2  37.0 - 54.0 fl Final   • MPV 05/25/2021 9.5  6.0 - 12.0 fL Final   • Platelets 05/25/2021 384  140 - 450 10*3/mm3 Final   • Neutrophil % 05/25/2021 82.7* 42.7 - 76.0 % Final   • Lymphocyte % 05/25/2021 11.9* 19.6 - 45.3 % Final   • Monocyte % 05/25/2021 4.3* 5.0 - 12.0 % Final   • Eosinophil % 05/25/2021 0.1* 0.3 - 6.2 % Final   • Basophil % 05/25/2021 0.2  0.0 - 1.5 % Final   • Immature Grans % 05/25/2021 0.8* 0.0 - 0.5 % Final   • Neutrophils, Absolute 05/25/2021 11.04* 1.70 - 7.00 10*3/mm3 Final   • Lymphocytes, Absolute 05/25/2021 1.59  0.70 - 3.10 10*3/mm3 Final   • Monocytes, Absolute 05/25/2021 0.57  0.10 - 0.90 10*3/mm3 Final   • Eosinophils, Absolute 05/25/2021 0.01  0.00 - 0.40 10*3/mm3 Final   • Basophils, Absolute 05/25/2021 0.02  0.00 - 0.20 10*3/mm3 Final   • Immature Grans, Absolute 05/25/2021 0.10* 0.00 - 0.05 10*3/mm3 Final   • nRBC 05/25/2021 0.0  0.0 - 0.2 /100 WBC Final   Lab on 05/21/2021   Component Date Value Ref Range Status   • Glucose 05/21/2021 117  74 - 124 mg/dL Final   • BUN 05/21/2021 13  6 - 20 mg/dL Final   • Creatinine 05/21/2021 0.92  0.60 - 1.10 mg/dL Final   • Sodium  05/21/2021 137  134 - 145 mmol/L Final   • Potassium 05/21/2021 4.8* 3.5 - 4.7 mmol/L Final   • Chloride 05/21/2021 99  98 - 107 mmol/L Final   • CO2 05/21/2021 28.5  22.0 - 29.0 mmol/L Final   • Calcium 05/21/2021 9.6  8.5 - 10.2 mg/dL Final   • Total Protein 05/21/2021 7.8  6.3 - 8.0 g/dL Final   • Albumin 05/21/2021 4.20  3.50 - 5.20 g/dL Final   • ALT (SGPT) 05/21/2021 11  0 - 33 U/L Final   • AST (SGOT) 05/21/2021 12  0 - 32 U/L Final   • Alkaline Phosphatase 05/21/2021 136* 38 - 116 U/L Final   • Total Bilirubin 05/21/2021 <0.2* 0.2 - 1.2 mg/dL Final   • eGFR Non  Amer 05/21/2021 63  >60 mL/min/1.73 Final   • Globulin 05/21/2021 3.6* 1.8 - 3.5 gm/dL Final   • A/G Ratio 05/21/2021 1.2  1.1 - 2.4 g/dL Final   • BUN/Creatinine Ratio 05/21/2021 14.1  7.3 - 30.0 Final   • Anion Gap 05/21/2021 9.5  5.0 - 15.0 mmol/L Final   • WBC 05/21/2021 11.98* 3.40 - 10.80 10*3/mm3 Final   • RBC 05/21/2021 4.75  3.77 - 5.28 10*6/mm3 Final   • Hemoglobin 05/21/2021 12.5  12.0 - 15.9 g/dL Final   • Hematocrit 05/21/2021 40.9  34.0 - 46.6 % Final   • MCV 05/21/2021 86.1  79.0 - 97.0 fL Final   • MCH 05/21/2021 26.3* 26.6 - 33.0 pg Final   • MCHC 05/21/2021 30.6* 31.5 - 35.7 g/dL Final   • RDW 05/21/2021 13.7  12.3 - 15.4 % Final   • RDW-SD 05/21/2021 42.9  37.0 - 54.0 fl Final   • MPV 05/21/2021 9.5  6.0 - 12.0 fL Final   • Platelets 05/21/2021 363  140 - 450 10*3/mm3 Final   • Neutrophil % 05/21/2021 73.7  42.7 - 76.0 % Final   • Lymphocyte % 05/21/2021 15.6* 19.6 - 45.3 % Final   • Monocyte % 05/21/2021 8.4  5.0 - 12.0 % Final   • Eosinophil % 05/21/2021 1.5  0.3 - 6.2 % Final   • Basophil % 05/21/2021 0.5  0.0 - 1.5 % Final   • Immature Grans % 05/21/2021 0.3  0.0 - 0.5 % Final   • Neutrophils, Absolute 05/21/2021 8.82* 1.70 - 7.00 10*3/mm3 Final   • Lymphocytes, Absolute 05/21/2021 1.87  0.70 - 3.10 10*3/mm3 Final   • Monocytes, Absolute 05/21/2021 1.01* 0.10 - 0.90 10*3/mm3 Final   • Eosinophils, Absolute 05/21/2021  0.18  0.00 - 0.40 10*3/mm3 Final   • Basophils, Absolute 05/21/2021 0.06  0.00 - 0.20 10*3/mm3 Final   • Immature Grans, Absolute 05/21/2021 0.04  0.00 - 0.05 10*3/mm3 Final   • nRBC 05/21/2021 0.0  0.0 - 0.2 /100 WBC Final   Pre-Admission Testing on 05/18/2021   Component Date Value Ref Range Status   • Glucose 05/18/2021 100* 65 - 99 mg/dL Final   • BUN 05/18/2021 18  6 - 20 mg/dL Final   • Creatinine 05/18/2021 0.97  0.57 - 1.00 mg/dL Final   • Sodium 05/18/2021 136  136 - 145 mmol/L Final   • Potassium 05/18/2021 5.0  3.5 - 5.2 mmol/L Final   • Chloride 05/18/2021 97* 98 - 107 mmol/L Final   • CO2 05/18/2021 29.0  22.0 - 29.0 mmol/L Final   • Calcium 05/18/2021 9.6  8.6 - 10.5 mg/dL Final   • eGFR Non African Amer 05/18/2021 59* >60 mL/min/1.73 Final   • BUN/Creatinine Ratio 05/18/2021 18.6  7.0 - 25.0 Final   • Anion Gap 05/18/2021 10.0  5.0 - 15.0 mmol/L Final   • Protime 05/18/2021 12.7  11.7 - 14.2 Seconds Final   • INR 05/18/2021 0.97  0.90 - 1.10 Final   • COVID19 05/18/2021 Not Detected  Not Detected - Ref. Range Final        MRI Thoracic Spine With & Without Contrast    Result Date: 5/24/2021   Within the posterior aspect of the left portion of the L1 vertebral body, there is a 7 mm indeterminate rounded focus of T1 hypointense signal. Unfortunately, this area is not well characterized on either the T2 fat-saturated images or the post gadolinium fat-saturated T1-weighted images as there is failure of fat saturation in these areas on this examination.  I recommend further evaluation with an MRI of the lumbar spine with and without the use of IV contrast. Ultimately, the study may not be diagnostic either potentially yielding this lesion as indeterminate representing either an atypical hemangioma or a metastatic focus. However, this exam would serve as a useful baseline study for further follow-up. These findings and recommendations are discussed with Dr. Snell on 05/24/2021 at approximately 3:30 PM.   There is mild levoconvex scoliotic curvature of the thoracic spine.  Incidental tiny right central disc protrusion at T4-5.   This report was finalized on 5/24/2021 3:48 PM by Dr. Stu Oates M.D.      XR Chest 1 View    Result Date: 5/20/2021  New right Mediport catheter as described.  This report was finalized on 5/20/2021 4:05 PM by Dr. Jelani Santacruz M.D.      MRI Lumbar Spine With & Without Contrast    Result Date: 5/28/2021  1. There is a 6 x 5 x 6 mm rounded T1 hypointense, T2 hyperintense, faintly enhancing lesion in the left posterior body of the L1 vertebra. It is unchanged in size over the last 5 days since MRI of thoracic spine on 05/22/2021. This is an indeterminate vertebral body lesion and could be a benign hemangioma, although it could also be a tiny osseous-vertebral metastasis or myelomatous lesion. No additional osseous lesions are seen in the lumbar spine. The patient had a recent whole body PET-CT scan on 04/14/2021 and no discernible lytic or blastic abnormality was seen at this site on the CT images and no hypermetabolic activity was seen at the site which favors a benign hemangioma over a vertebral metastasis. Recommend reevaluation of this tiny L1 vertebral lesion on a follow-up MRI of the lumbar spine. 2. There is mild lower lumbar spondylosis. At L4-L5, there is mild right and mild-to-moderate left facet overgrowth, mild disc space narrowing, posterior central small disc protrusion indents the anterior midline of the thecal sac mildly narrowing the central portion of the thecal sac, comes close to the anteromedial aspect of the traversing L5 nerve roots, but does not abut or compress them. There is minimal if any foraminal narrowing. 3. At L5-S1, there is moderate bilateral facet overgrowth, 5 mm degenerative anterolisthesis of L5 with respect to S1. There is some narrowing of the lateral recesses bilaterally, could compromise the traversing S1 nerve roots. There is loss of vertical  height, foraminal bulging, disc material into the inferior right foramen, synovial thickening off the facets extending to the posterior foramen, and there is mild-to-moderate bilateral foraminal narrowing.  This report was finalized on 5/28/2021 6:59 AM by Dr. Raphael Kingston M.D.      US Axilla Bilateral    Result Date: 5/20/2021  There is no evidence for axillary adenopathy in either axilla. Normal-appearing bilateral axillary lymph nodes are visualized.  This report was finalized on 5/20/2021 2:13 PM by Dr. Jeffrey Quevedo M.D.       4/28/2021  CBC-WBC 9.84, hemoglobin 13.4, platelets 275    MRI of the brain 5/14/2021-images independently reviewed and interpreted by me in detail summarized in HPI  Bilateral axillary ultrasound 5/20/2021 images independently reviewed and interpreted by me in detail summarized in HPI    5/25/2021  CBC-WBC 13.33, hemoglobin 12.3, platelets 384,000  CMP-BUN 14, creatinine 0.76, alkaline phosphatase mildly elevated at 132    6/15/2021  CBC-WBC 6.94, hemoglobin 9.7, platelets 4 59,000  CMP-BUN 12, creatinine 1, LFTs normal  Magnesium 1.7    Assessment/Plan      *Bilateral breast cancer  · Invasive ductal carcinoma in both right and left breast and lesions measure under 1 cm.  No palpable lymphadenopathy although unsure if this has been evaluated by an axillary ultrasound.  No abnormal axillary lymphadenopathy noted on PET.  · Most likely clinical T1b N0 M0, both cancers were noted to be grade 2, ER/AR positive and HER-2 negative and Ki-67 less than 15%  VATS on 4/30/2021  Biopsy confirms adenocarcinoma of pulmonary primary  Discussed with Dr. Molina about delaying breast surgery and she is in agreement  We discussed starting neoadjuvant endocrine therapy with anastrozole 1 mg p.o. daily  · Patient experienced significant adverse effects with anastrozole  · Treatment changed to letrozole   · Axillary ultrasound 4/20/2021 - for any axillary lymphadenopathy  · Started letrozole in May  2021  · Experiencing arthralgias which are controlled with Tylenol    *Adenocarcinoma of the left lung  · Most likely stage T1 N2 M0, stage III  · MRI of the thoracic spine ordered to further evaluate the abnormality seen on PET  · MRI of the brain negative   · Given that she is only 55 and fairly good performance status I discussed with her by with concurrent chemoradiation with cisplatin and Alimta every 3 weeks followed by durvalumab.  · Adverse effects including but not limited to myelosuppression, increased risk of infections, nausea, vomiting, renal dysfunction, ototoxicity, neurotoxicity have been discussed at length.  · She has been recommended to start folic acid and she will be administered B12 injection today.  · MRI of the thoracic spine performed 5/24/2021 shows no evidence of metastatic disease in the thoracic spine however in L1 there is a 7 mm lesion which is indeterminate.  A lumbar spine MRI has been recommended.  · Dr. Snell personally discussed her case with Dr. Oates and he feels like MRI of the lumbar spine may also show that this lesion is indeterminate.  · Discussed the same with the patient and her .  · Even if this is metastatic disease this might be the only lesion of metastatic disease and then would consider this oligometastatic disease.  · Therefore plan to proceed with concurrent chemoradiation as scheduled.  · Labs reviewed and she is okay to proceed with cisplatin and Alimta, C1D1 5/25/2021  · Day 1 of radiation 5/25/2021  · 5/27/2021-MRI of the lumbar spine-there is a 6 x 5 x 6 mm enhancing lesion in the left posterior body of L1 vertebra.  This is considered indeterminate.  Follow-up recommended.  Degenerative changes noted at L5-S1    *Lumbar spine lesion-L1 lesion indeterminate.  MRI of the lumbar spine spine images independently reviewed and interpreted by me in detail summarized above.  Plan to repeat MRI in 4 months.    *Bilateral breast cancer-no family history of  breast cancer but given synchronous breast cancer genetic testing has been sent.  Genetic testing with a variant of unknown significance.    *Anxiety-she is on several different medications currently.  Referral has been made to supportive oncology clinic today.  She is requesting an increase in the dose of the Xanax.  Okay to increase the dose to 1 mg nightly to help with sleep.  Further management of these medications will be deferred to supportive oncology once she has established care with them.  He has been started on gabapentin.  Continue follow-up with supportive oncology, this is unchanged    *Left-sided chest wall pain-secondary to VATS.  Most likely neuropathic.  Continue gabapentin and Norco as needed.  Unchanged    *Abdominal bloating-likely secondary to ongoing radiation and gastritis.  Recommend taking omeprazole twice daily    *Hypertension-continue lisinopril.  Blood pressure 137/88    PLAN:  1.  Proceed with cisplatin and Alimta cycle 2  2.continue daily radiation  3.continue letrozole  4.omeprazole twice daily  5.follow-up-3 weeks     The patient is on high risk medication that requires close monitoring for toxicity.    Salma Snell MD  06/02/2021

## 2021-06-15 NOTE — NURSING NOTE
Pt c/o headache, tylenol 650 mg ordered per Dr Snell    Total voided urine 1100 cc, approx weight gain 6 pounds, pt lungs CTA.  Denies any worse SOA.  Educated to call for increased SOA or swelling, she v/u.

## 2021-06-16 ENCOUNTER — APPOINTMENT (OUTPATIENT)
Dept: MRI IMAGING | Facility: HOSPITAL | Age: 56
End: 2021-06-16

## 2021-06-16 PROCEDURE — 77014 CHG CT GUIDANCE RADIATION THERAPY FLDS PLACEMENT: CPT | Performed by: RADIOLOGY

## 2021-06-16 PROCEDURE — 77386 CHG INTENSITY MODULATED RADIATION TX DLVR COMPLEX: CPT | Performed by: RADIOLOGY

## 2021-06-16 PROCEDURE — 77427 RADIATION TX MANAGEMENT X5: CPT | Performed by: RADIOLOGY

## 2021-06-16 PROCEDURE — 77386: CPT | Performed by: RADIOLOGY

## 2021-06-17 PROCEDURE — 77386: CPT | Performed by: RADIOLOGY

## 2021-06-17 PROCEDURE — 77014 CHG CT GUIDANCE RADIATION THERAPY FLDS PLACEMENT: CPT | Performed by: RADIOLOGY

## 2021-06-17 PROCEDURE — 77386 CHG INTENSITY MODULATED RADIATION TX DLVR COMPLEX: CPT | Performed by: RADIOLOGY

## 2021-06-18 PROCEDURE — 77386 CHG INTENSITY MODULATED RADIATION TX DLVR COMPLEX: CPT | Performed by: RADIOLOGY

## 2021-06-18 PROCEDURE — 77386: CPT | Performed by: RADIOLOGY

## 2021-06-18 PROCEDURE — 77014 CHG CT GUIDANCE RADIATION THERAPY FLDS PLACEMENT: CPT | Performed by: RADIOLOGY

## 2021-06-21 PROCEDURE — 77386: CPT | Performed by: RADIOLOGY

## 2021-06-21 PROCEDURE — 77014 CHG CT GUIDANCE RADIATION THERAPY FLDS PLACEMENT: CPT | Performed by: RADIOLOGY

## 2021-06-21 PROCEDURE — 77386 CHG INTENSITY MODULATED RADIATION TX DLVR COMPLEX: CPT | Performed by: RADIOLOGY

## 2021-06-22 ENCOUNTER — RADIATION ONCOLOGY WEEKLY ASSESSMENT (OUTPATIENT)
Dept: RADIATION ONCOLOGY | Facility: HOSPITAL | Age: 56
End: 2021-06-22

## 2021-06-22 ENCOUNTER — OFFICE VISIT (OUTPATIENT)
Dept: ONCOLOGY | Facility: CLINIC | Age: 56
End: 2021-06-22

## 2021-06-22 ENCOUNTER — INFUSION (OUTPATIENT)
Dept: ONCOLOGY | Facility: HOSPITAL | Age: 56
End: 2021-06-22

## 2021-06-22 VITALS
RESPIRATION RATE: 18 BRPM | SYSTOLIC BLOOD PRESSURE: 117 MMHG | BODY MASS INDEX: 30.12 KG/M2 | WEIGHT: 170 LBS | TEMPERATURE: 96.6 F | HEIGHT: 63 IN | HEART RATE: 104 BPM | DIASTOLIC BLOOD PRESSURE: 80 MMHG | OXYGEN SATURATION: 97 %

## 2021-06-22 DIAGNOSIS — Z17.0 MALIGNANT NEOPLASM OF UPPER-OUTER QUADRANT OF RIGHT BREAST IN FEMALE, ESTROGEN RECEPTOR POSITIVE (HCC): Primary | ICD-10-CM

## 2021-06-22 DIAGNOSIS — C34.12 PRIMARY ADENOCARCINOMA OF UPPER LOBE OF LEFT LUNG (HCC): ICD-10-CM

## 2021-06-22 DIAGNOSIS — C50.411 MALIGNANT NEOPLASM OF UPPER-OUTER QUADRANT OF RIGHT BREAST IN FEMALE, ESTROGEN RECEPTOR POSITIVE (HCC): Primary | ICD-10-CM

## 2021-06-22 DIAGNOSIS — C34.12 PRIMARY ADENOCARCINOMA OF UPPER LOBE OF LEFT LUNG (HCC): Primary | ICD-10-CM

## 2021-06-22 DIAGNOSIS — Z45.2 ENCOUNTER FOR FITTING AND ADJUSTMENT OF VASCULAR CATHETER: Primary | ICD-10-CM

## 2021-06-22 LAB
ALBUMIN SERPL-MCNC: 4 G/DL (ref 3.5–5.2)
ALBUMIN/GLOB SERPL: 1.4 G/DL (ref 1.1–2.4)
ALP SERPL-CCNC: 102 U/L (ref 38–116)
ALT SERPL W P-5'-P-CCNC: 21 U/L (ref 0–33)
ANION GAP SERPL CALCULATED.3IONS-SCNC: 12.4 MMOL/L (ref 5–15)
AST SERPL-CCNC: 11 U/L (ref 0–32)
BASOPHILS # BLD AUTO: 0.03 10*3/MM3 (ref 0–0.2)
BASOPHILS NFR BLD AUTO: 0.6 % (ref 0–1.5)
BILIRUB SERPL-MCNC: 0.3 MG/DL (ref 0.2–1.2)
BUN SERPL-MCNC: 16 MG/DL (ref 6–20)
BUN/CREAT SERPL: 17.2 (ref 7.3–30)
CALCIUM SPEC-SCNC: 9.2 MG/DL (ref 8.5–10.2)
CHLORIDE SERPL-SCNC: 101 MMOL/L (ref 98–107)
CO2 SERPL-SCNC: 25.6 MMOL/L (ref 22–29)
CREAT SERPL-MCNC: 0.93 MG/DL (ref 0.6–1.1)
DEPRECATED RDW RBC AUTO: 43.1 FL (ref 37–54)
EOSINOPHIL # BLD AUTO: 0.12 10*3/MM3 (ref 0–0.4)
EOSINOPHIL NFR BLD AUTO: 2.6 % (ref 0.3–6.2)
ERYTHROCYTE [DISTWIDTH] IN BLOOD BY AUTOMATED COUNT: 14.2 % (ref 12.3–15.4)
GFR SERPL CREATININE-BSD FRML MDRD: 62 ML/MIN/1.73
GLOBULIN UR ELPH-MCNC: 2.8 GM/DL (ref 1.8–3.5)
GLUCOSE SERPL-MCNC: 109 MG/DL (ref 74–124)
HCT VFR BLD AUTO: 41.5 % (ref 34–46.6)
HGB BLD-MCNC: 12.9 G/DL (ref 12–15.9)
IMM GRANULOCYTES # BLD AUTO: 0.04 10*3/MM3 (ref 0–0.05)
IMM GRANULOCYTES NFR BLD AUTO: 0.9 % (ref 0–0.5)
LYMPHOCYTES # BLD AUTO: 1.15 10*3/MM3 (ref 0.7–3.1)
LYMPHOCYTES NFR BLD AUTO: 24.5 % (ref 19.6–45.3)
MCH RBC QN AUTO: 26.5 PG (ref 26.6–33)
MCHC RBC AUTO-ENTMCNC: 31.1 G/DL (ref 31.5–35.7)
MCV RBC AUTO: 85.2 FL (ref 79–97)
MONOCYTES # BLD AUTO: 0.31 10*3/MM3 (ref 0.1–0.9)
MONOCYTES NFR BLD AUTO: 6.6 % (ref 5–12)
NEUTROPHILS NFR BLD AUTO: 3.05 10*3/MM3 (ref 1.7–7)
NEUTROPHILS NFR BLD AUTO: 64.8 % (ref 42.7–76)
NRBC BLD AUTO-RTO: 0 /100 WBC (ref 0–0.2)
PLATELET # BLD AUTO: 187 10*3/MM3 (ref 140–450)
PMV BLD AUTO: 9.3 FL (ref 6–12)
POTASSIUM SERPL-SCNC: 4.3 MMOL/L (ref 3.5–4.7)
PROT SERPL-MCNC: 6.8 G/DL (ref 6.3–8)
RBC # BLD AUTO: 4.87 10*6/MM3 (ref 3.77–5.28)
SODIUM SERPL-SCNC: 139 MMOL/L (ref 134–145)
WBC # BLD AUTO: 4.7 10*3/MM3 (ref 3.4–10.8)

## 2021-06-22 PROCEDURE — 77336 RADIATION PHYSICS CONSULT: CPT | Performed by: RADIOLOGY

## 2021-06-22 PROCEDURE — 85025 COMPLETE CBC W/AUTO DIFF WBC: CPT

## 2021-06-22 PROCEDURE — 25010000002 HEPARIN LOCK FLUSH PER 10 UNITS: Performed by: INTERNAL MEDICINE

## 2021-06-22 PROCEDURE — 80053 COMPREHEN METABOLIC PANEL: CPT

## 2021-06-22 PROCEDURE — 77386 CHG INTENSITY MODULATED RADIATION TX DLVR COMPLEX: CPT | Performed by: RADIOLOGY

## 2021-06-22 PROCEDURE — 77386: CPT | Performed by: RADIOLOGY

## 2021-06-22 PROCEDURE — 96360 HYDRATION IV INFUSION INIT: CPT

## 2021-06-22 PROCEDURE — 77014 CHG CT GUIDANCE RADIATION THERAPY FLDS PLACEMENT: CPT | Performed by: RADIOLOGY

## 2021-06-22 PROCEDURE — 99214 OFFICE O/P EST MOD 30 MIN: CPT | Performed by: INTERNAL MEDICINE

## 2021-06-22 RX ORDER — HEPARIN SODIUM (PORCINE) LOCK FLUSH IV SOLN 100 UNIT/ML 100 UNIT/ML
500 SOLUTION INTRAVENOUS AS NEEDED
Status: DISCONTINUED | OUTPATIENT
Start: 2021-06-22 | End: 2021-06-22 | Stop reason: HOSPADM

## 2021-06-22 RX ORDER — HEPARIN SODIUM (PORCINE) LOCK FLUSH IV SOLN 100 UNIT/ML 100 UNIT/ML
500 SOLUTION INTRAVENOUS AS NEEDED
Status: CANCELLED | OUTPATIENT
Start: 2021-06-22

## 2021-06-22 RX ORDER — SODIUM CHLORIDE 0.9 % (FLUSH) 0.9 %
10 SYRINGE (ML) INJECTION AS NEEDED
Status: CANCELLED | OUTPATIENT
Start: 2021-06-22

## 2021-06-22 RX ORDER — SODIUM CHLORIDE 9 MG/ML
500 INJECTION, SOLUTION INTRAVENOUS ONCE
Status: COMPLETED | OUTPATIENT
Start: 2021-06-22 | End: 2021-06-22

## 2021-06-22 RX ADMIN — SODIUM CHLORIDE 500 ML: 9 INJECTION, SOLUTION INTRAVENOUS at 11:11

## 2021-06-22 RX ADMIN — Medication 500 UNITS: at 11:55

## 2021-06-22 NOTE — PROGRESS NOTES
Subjective   Holli Patel is a 56 y.o. female.  Referred by Dr. 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    3/1/2021-bilateral screening mammogram-microcalcifications seen in the posterior one third of the lateral aspect of the right breast.  Area of focal asymmetry seen in the middle one third of the upper inner quadrant of the left breast.    3/1/2021-DEXA scan-T score of -2.2 on the lumbar spine and T score of -2.3 in the left hip and T score of -1.9 in the right hip.  Findings consistent with osteopenia    3/23/2021-screening lung CT-there is a 10 x 11 mm solid nodule in the left upper lobe.  Enlarged AP window lymph node measuring 14 x 10 mm.  Suggestion of a possible 9 mm left hilar lymph node.  Heavy coronary artery calcification.    3/23/2021-diagnostic mammogram bilateral-cluster of microcalcifications in the middle third lateral aspect of the right breast.  Left breast demonstrates persistence of the area of focal asymmetry in the region.    Ultrasound-left breast ultrasound at 10 o'clock position, 6 cm from the nipple there is a 0.4 cm irregular hypoechoic lesion.  Stereotactic biopsy of the right breast calcifications recommended.  Ultrasound-guided biopsy of the left breast lesion recommended    4/7/2021-right breast ear tactic biopsy and left breast ultrasound-guided biopsy  Pathology  Right breast-invasive ductal carcinoma, grade 2, lymphovascular invasion present, ER +99% strong, GA +80% moderate, HER-2 negative, Ki-67 12%  Left breast upper inner quadrant 10 o'clock position biopsy, invasive ductal carcinoma, grade   2, ER +99% strong, GA +40% strong, HER-2 2+ on immunohistochemistry, nonamplified on FISH Ki-67 10%    4/14/2021-PET/CT-FDG avid aortopulmonary window lymph node measures 0.9 cm with an SUV of 7.5.  FDG avid left hilar lymph node  measures 1 cm with an SUV of 17.2.  Irregular soft tissue density in the right breast measuring 3.7 x 3.8 cm is favored to be secondary to the recent breast biopsy.  1.1 cm pulmonary nodule within the left upper lobe with SUV 9.7 .  Sub-6 mm pulmonary nodules are present in the right lower lobe.  No evidence of lymphadenopathy or metastatic disease in the abdomen.  Focal area of FDG uptake within the central canal posterior to T11-T12 displaced demonstrating an SUV of 5.5 thought to be reactive however MRI is recommended for further evaluation.    She was seen by Dr. Molina who initially planned for breast surgery however  due to the PET abnormalities she has been referred to Dr. Kerr who plans to do a wound on 4/30/2021.  Dr. Molina's and Dr. Kerr's notes reviewed.    Patient denies any family history of breast cancer.  Her mother had lymphoma.  Maternal grandmother had colon cancer.  Prior to that screening mammogram she did not have any palpable abnormalities of either breast.    She has been a heavy smoker for about 40 years and smoked 2 packs/day.  Denies any recent weight changes, new bone pains, cough, abdominal pain nausea vomiting constipation or diarrhea.  She does have anxiety and has been on several medications.  She has recently been started on Xanax to help with the mood and also with insomnia.    Patient had a video-assisted thoracoscopy and mediastinal lymphadenectomy on 4/30/2021.  L5-L6 lymph nodes were biopsied however the small pulmonary nodule in the left upper lobe was not difficult to find.  Pathology showed moderately differentiated adenocarcinoma which is CK7 and TTF-1 positive.  Consistent with lung primary.  Ki-67 85%.    5/14/2021-MRI of the brain-minimal chronic small vessel ischemic change in the white matter.  Otherwise negative MRI.    5/20/2021-bilateral axillary ultrasound-no evidence of axillary lymphadenopathy in either axilla.  Normal-appearing bilateral axillary lymph  nodes visualized.    Cycle 1 cisplatin and Alimta on 5/25/2021.    Cycle 2 cisplatin Alimta 6/15/2021    Interval history  Patient presents to the clinic today for follow-up.  Cycle 2-day 8, here for IV fluids  Reports feeling extremely tired.  No mucositis.  Good appetite.  Good urine output.  Had tinnitus in her right ear briefly but resolved.  No hearing difficulty.  No neuropathy.  Left upper quadrant abdominal pain discomfort has resolved.  She has been trying to quit smoking and has not smoked in 2 days.    The following portions of the patient's history were reviewed and updated as appropriate: allergies, current medications, past family history, past medical history, past social history, past surgical history and problem list.    Past Medical History:   Diagnosis Date   • Anxiety    • Dyspnea on exertion    • SHAYY (generalized anxiety disorder)     RELATED HIGH BLOOD PRESSURE   • GERD (gastroesophageal reflux disease)    • High blood pressure     ANXIETY RELATED   • Hyperlipidemia    • Hypothyroidism    • Insomnia    • Lung cancer (CMS/HCC)    • Lung nodule    • Malignant neoplasm of upper-outer quadrant of right breast in female, estrogen receptor positive (CMS/HCC) 4/13/2021   • Migraine    • PONV (postoperative nausea and vomiting)         Past Surgical History:   Procedure Laterality Date   • BREAST BIOPSY Bilateral 04/07/2021    Right Breast 10 o'clock & Left breast 10 o'clock 6 cm from nipple, [facility]   • CLOSED REDUCTION HIP DISLOCATION Left 4/30/2021    Procedure: BRONCHOSCOPY, LEFT VIDEO ASSITED THORACOSCOPY, ROBOTIC ASSSITED MEDIASTINAL LYMPHADENECTOMY WITH INTERCOSTAL NERVE BLOCKS;  Surgeon: Raphael Kerr III, MD;  Location: [facility];  Service: DaVinci;  Laterality: Left;   • COLONOSCOPY     • VENOUS ACCESS DEVICE (PORT) INSERTION Right 5/20/2021    Procedure: INSERTION VENOUS ACCESS DEVICE;  Surgeon: Raphael Kerr III, MD;  Location: [facility] OR;  Service: Thoracic;  Laterality:  "Right;   • WRIST SURGERY Right         Family History   Problem Relation Age of Onset   • Cancer Father         LYMPHATIC   • Alcohol abuse Brother    • Colon cancer Maternal Grandmother    • Malig Hyperthermia Neg Hx         Social History     Socioeconomic History   • Marital status:      Spouse name: Not on file   • Number of children: Not on file   • Years of education: Not on file   • Highest education level: Not on file   Tobacco Use   • Smoking status: Current Every Day Smoker     Packs/day: 1.00     Types: Electronic Cigarette, Cigarettes     Start date: 1981   • Smokeless tobacco: Never Used   • Tobacco comment: ABT 15 CIGARETTES DAILY   Vaping Use   • Vaping Use: Never used   Substance and Sexual Activity   • Alcohol use: Never   • Drug use: Never   • Sexual activity: Yes        OB History        2    Para   2    Term   2            AB        Living           SAB        TAB        Ectopic        Molar        Multiple        Live Births                Age of menarche-10  Age at menopause-42  Oral contraceptive pill use-15 years  No HRT use  She has 2 children  Age at first live childbirth-17    No Known Allergies     Review of Systems   Review of systems as mentioned in the HPI    Objective   Height 160 cm (62.99\"), not currently breastfeeding.   Physical Exam  Vitals reviewed.   Constitutional:       Appearance: She is obese.   HENT:      Head: Normocephalic.      Nose: Nose normal.      Mouth/Throat:      Mouth: Mucous membranes are moist.      Pharynx: Oropharynx is clear.   Eyes:      Extraocular Movements: Extraocular movements intact.      Conjunctiva/sclera: Conjunctivae normal.      Pupils: Pupils are equal, round, and reactive to light.   Cardiovascular:      Rate and Rhythm: Normal rate and regular rhythm.      Pulses: Normal pulses.      Heart sounds: Normal heart sounds.   Pulmonary:      Effort: Pulmonary effort is normal.      Breath sounds: Normal breath sounds. "   Abdominal:      General: Abdomen is flat. Bowel sounds are normal. There is no distension.      Palpations: There is no mass.   Musculoskeletal:         General: Normal range of motion.      Cervical back: Normal range of motion.   Skin:     General: Skin is warm.   Neurological:      General: No focal deficit present.      Mental Status: She is alert and oriented to person, place, and time.   Psychiatric:         Mood and Affect: Mood normal.         Behavior: Behavior normal.         Thought Content: Thought content normal.         Judgment: Judgment normal.     Breast Exam: Right breast with a large postbiopsy hematoma in the upper outer quadrant measuring 4 cm.  Visible ecchymosis.  No other palpable abnormalities.  Left breast with no palpable abnormalities.  No right or left axillary lymphadenopathy    I have reexamined the patient and the results are consistent with the previously documented exam. Salma Snell MD     Infusion on 06/15/2021   Component Date Value Ref Range Status   • Glucose 06/15/2021 109  74 - 124 mg/dL Final   • BUN 06/15/2021 12  6 - 20 mg/dL Final   • Creatinine 06/15/2021 1.00  0.60 - 1.10 mg/dL Final   • Sodium 06/15/2021 141  134 - 145 mmol/L Final   • Potassium 06/15/2021 4.5  3.5 - 4.7 mmol/L Final   • Chloride 06/15/2021 103  98 - 107 mmol/L Final   • CO2 06/15/2021 25.9  22.0 - 29.0 mmol/L Final   • Calcium 06/15/2021 9.2  8.5 - 10.2 mg/dL Final   • Total Protein 06/15/2021 7.1  6.3 - 8.0 g/dL Final   • Albumin 06/15/2021 3.90  3.50 - 5.20 g/dL Final   • ALT (SGPT) 06/15/2021 10  0 - 33 U/L Final   • AST (SGOT) 06/15/2021 13  0 - 32 U/L Final   • Alkaline Phosphatase 06/15/2021 121* 38 - 116 U/L Final   • Total Bilirubin 06/15/2021 <0.2* 0.2 - 1.2 mg/dL Final   • eGFR Non African Amer 06/15/2021 57* >60 mL/min/1.73 Final   • Globulin 06/15/2021 3.2  1.8 - 3.5 gm/dL Final   • A/G Ratio 06/15/2021 1.2  1.1 - 2.4 g/dL Final   • BUN/Creatinine Ratio 06/15/2021 12.0  7.3 - 30.0  Final   • Anion Gap 06/15/2021 12.1  5.0 - 15.0 mmol/L Final   • Magnesium 06/15/2021 1.7* 1.8 - 2.5 mg/dL Final   • WBC 06/15/2021 6.94  3.40 - 10.80 10*3/mm3 Final   • RBC 06/15/2021 4.35  3.77 - 5.28 10*6/mm3 Final   • Hemoglobin 06/15/2021 11.7* 12.0 - 15.9 g/dL Final   • Hematocrit 06/15/2021 38.0  34.0 - 46.6 % Final   • MCV 06/15/2021 87.4  79.0 - 97.0 fL Final   • MCH 06/15/2021 26.9  26.6 - 33.0 pg Final   • MCHC 06/15/2021 30.8* 31.5 - 35.7 g/dL Final   • RDW 06/15/2021 14.6  12.3 - 15.4 % Final   • RDW-SD 06/15/2021 46.3  37.0 - 54.0 fl Final   • MPV 06/15/2021 8.9  6.0 - 12.0 fL Final   • Platelets 06/15/2021 459* 140 - 450 10*3/mm3 Final   • Neutrophil % 06/15/2021 49.7  42.7 - 76.0 % Final   • Lymphocyte % 06/15/2021 23.9  19.6 - 45.3 % Final   • Monocyte % 06/15/2021 17.6* 5.0 - 12.0 % Final   • Eosinophil % 06/15/2021 3.9  0.3 - 6.2 % Final   • Basophil % 06/15/2021 1.7* 0.0 - 1.5 % Final   • Immature Grans % 06/15/2021 3.2* 0.0 - 0.5 % Final   • Neutrophils, Absolute 06/15/2021 3.45  1.70 - 7.00 10*3/mm3 Final   • Lymphocytes, Absolute 06/15/2021 1.66  0.70 - 3.10 10*3/mm3 Final   • Monocytes, Absolute 06/15/2021 1.22* 0.10 - 0.90 10*3/mm3 Final   • Eosinophils, Absolute 06/15/2021 0.27  0.00 - 0.40 10*3/mm3 Final   • Basophils, Absolute 06/15/2021 0.12  0.00 - 0.20 10*3/mm3 Final   • Immature Grans, Absolute 06/15/2021 0.22* 0.00 - 0.05 10*3/mm3 Final   • nRBC 06/15/2021 0.0  0.0 - 0.2 /100 WBC Final   Infusion on 06/02/2021   Component Date Value Ref Range Status   • Glucose 06/02/2021 95  74 - 124 mg/dL Final   • BUN 06/02/2021 15  6 - 20 mg/dL Final   • Creatinine 06/02/2021 1.10  0.60 - 1.10 mg/dL Final   • Sodium 06/02/2021 141  134 - 145 mmol/L Final   • Potassium 06/02/2021 4.6  3.5 - 4.7 mmol/L Final   • Chloride 06/02/2021 103  98 - 107 mmol/L Final   • CO2 06/02/2021 27.1  22.0 - 29.0 mmol/L Final   • Calcium 06/02/2021 9.2  8.5 - 10.2 mg/dL Final   • Total Protein 06/02/2021 6.7  6.3 -  8.0 g/dL Final   • Albumin 06/02/2021 3.80  3.50 - 5.20 g/dL Final   • ALT (SGPT) 06/02/2021 18  0 - 33 U/L Final   • AST (SGOT) 06/02/2021 13  0 - 32 U/L Final   • Alkaline Phosphatase 06/02/2021 107  38 - 116 U/L Final   • Total Bilirubin 06/02/2021 <0.2* 0.2 - 1.2 mg/dL Final   • eGFR Non African Amer 06/02/2021 51* >60 mL/min/1.73 Final   • Globulin 06/02/2021 2.9  1.8 - 3.5 gm/dL Final   • A/G Ratio 06/02/2021 1.3  1.1 - 2.4 g/dL Final   • BUN/Creatinine Ratio 06/02/2021 13.6  7.3 - 30.0 Final   • Anion Gap 06/02/2021 10.9  5.0 - 15.0 mmol/L Final   • WBC 06/02/2021 6.26  3.40 - 10.80 10*3/mm3 Final   • RBC 06/02/2021 4.52  3.77 - 5.28 10*6/mm3 Final   • Hemoglobin 06/02/2021 12.0  12.0 - 15.9 g/dL Final   • Hematocrit 06/02/2021 38.9  34.0 - 46.6 % Final   • MCV 06/02/2021 86.1  79.0 - 97.0 fL Final   • MCH 06/02/2021 26.5* 26.6 - 33.0 pg Final   • MCHC 06/02/2021 30.8* 31.5 - 35.7 g/dL Final   • RDW 06/02/2021 13.7  12.3 - 15.4 % Final   • RDW-SD 06/02/2021 43.0  37.0 - 54.0 fl Final   • MPV 06/02/2021 9.3  6.0 - 12.0 fL Final   • Platelets 06/02/2021 244  140 - 450 10*3/mm3 Final   • Neutrophil % 06/02/2021 57.0  42.7 - 76.0 % Final   • Lymphocyte % 06/02/2021 27.0  19.6 - 45.3 % Final   • Monocyte % 06/02/2021 11.7  5.0 - 12.0 % Final   • Eosinophil % 06/02/2021 3.2  0.3 - 6.2 % Final   • Basophil % 06/02/2021 0.3  0.0 - 1.5 % Final   • Immature Grans % 06/02/2021 0.8* 0.0 - 0.5 % Final   • Neutrophils, Absolute 06/02/2021 3.57  1.70 - 7.00 10*3/mm3 Final   • Lymphocytes, Absolute 06/02/2021 1.69  0.70 - 3.10 10*3/mm3 Final   • Monocytes, Absolute 06/02/2021 0.73  0.10 - 0.90 10*3/mm3 Final   • Eosinophils, Absolute 06/02/2021 0.20  0.00 - 0.40 10*3/mm3 Final   • Basophils, Absolute 06/02/2021 0.02  0.00 - 0.20 10*3/mm3 Final   • Immature Grans, Absolute 06/02/2021 0.05  0.00 - 0.05 10*3/mm3 Final   • nRBC 06/02/2021 0.0  0.0 - 0.2 /100 WBC Final   Infusion on 05/25/2021   Component Date Value Ref Range  Status   • Glucose 05/25/2021 125* 74 - 124 mg/dL Final   • BUN 05/25/2021 14  6 - 20 mg/dL Final   • Creatinine 05/25/2021 0.76  0.60 - 1.10 mg/dL Final   • Sodium 05/25/2021 139  134 - 145 mmol/L Final   • Potassium 05/25/2021 4.5  3.5 - 4.7 mmol/L Final   • Chloride 05/25/2021 101  98 - 107 mmol/L Final   • CO2 05/25/2021 27.1  22.0 - 29.0 mmol/L Final   • Calcium 05/25/2021 9.9  8.5 - 10.2 mg/dL Final   • Total Protein 05/25/2021 7.4  6.3 - 8.0 g/dL Final   • Albumin 05/25/2021 4.20  3.50 - 5.20 g/dL Final   • ALT (SGPT) 05/25/2021 14  0 - 33 U/L Final   • AST (SGOT) 05/25/2021 12  0 - 32 U/L Final   • Alkaline Phosphatase 05/25/2021 132* 38 - 116 U/L Final   • Total Bilirubin 05/25/2021 <0.2* 0.2 - 1.2 mg/dL Final   • eGFR Non African Amer 05/25/2021 78  >60 mL/min/1.73 Final   • Globulin 05/25/2021 3.2  1.8 - 3.5 gm/dL Final   • A/G Ratio 05/25/2021 1.3  1.1 - 2.4 g/dL Final   • BUN/Creatinine Ratio 05/25/2021 18.4  7.3 - 30.0 Final   • Anion Gap 05/25/2021 10.9  5.0 - 15.0 mmol/L Final   • Magnesium 05/25/2021 1.7* 1.8 - 2.5 mg/dL Final   • WBC 05/25/2021 13.33* 3.40 - 10.80 10*3/mm3 Final   • RBC 05/25/2021 4.62  3.77 - 5.28 10*6/mm3 Final   • Hemoglobin 05/25/2021 12.3  12.0 - 15.9 g/dL Final   • Hematocrit 05/25/2021 39.2  34.0 - 46.6 % Final   • MCV 05/25/2021 84.8  79.0 - 97.0 fL Final   • MCH 05/25/2021 26.6  26.6 - 33.0 pg Final   • MCHC 05/25/2021 31.4* 31.5 - 35.7 g/dL Final   • RDW 05/25/2021 13.6  12.3 - 15.4 % Final   • RDW-SD 05/25/2021 42.2  37.0 - 54.0 fl Final   • MPV 05/25/2021 9.5  6.0 - 12.0 fL Final   • Platelets 05/25/2021 384  140 - 450 10*3/mm3 Final   • Neutrophil % 05/25/2021 82.7* 42.7 - 76.0 % Final   • Lymphocyte % 05/25/2021 11.9* 19.6 - 45.3 % Final   • Monocyte % 05/25/2021 4.3* 5.0 - 12.0 % Final   • Eosinophil % 05/25/2021 0.1* 0.3 - 6.2 % Final   • Basophil % 05/25/2021 0.2  0.0 - 1.5 % Final   • Immature Grans % 05/25/2021 0.8* 0.0 - 0.5 % Final   • Neutrophils, Absolute  05/25/2021 11.04* 1.70 - 7.00 10*3/mm3 Final   • Lymphocytes, Absolute 05/25/2021 1.59  0.70 - 3.10 10*3/mm3 Final   • Monocytes, Absolute 05/25/2021 0.57  0.10 - 0.90 10*3/mm3 Final   • Eosinophils, Absolute 05/25/2021 0.01  0.00 - 0.40 10*3/mm3 Final   • Basophils, Absolute 05/25/2021 0.02  0.00 - 0.20 10*3/mm3 Final   • Immature Grans, Absolute 05/25/2021 0.10* 0.00 - 0.05 10*3/mm3 Final   • nRBC 05/25/2021 0.0  0.0 - 0.2 /100 WBC Final        MRI Lumbar Spine With & Without Contrast    Result Date: 5/28/2021  1. There is a 6 x 5 x 6 mm rounded T1 hypointense, T2 hyperintense, faintly enhancing lesion in the left posterior body of the L1 vertebra. It is unchanged in size over the last 5 days since MRI of thoracic spine on 05/22/2021. This is an indeterminate vertebral body lesion and could be a benign hemangioma, although it could also be a tiny osseous-vertebral metastasis or myelomatous lesion. No additional osseous lesions are seen in the lumbar spine. The patient had a recent whole body PET-CT scan on 04/14/2021 and no discernible lytic or blastic abnormality was seen at this site on the CT images and no hypermetabolic activity was seen at the site which favors a benign hemangioma over a vertebral metastasis. Recommend reevaluation of this tiny L1 vertebral lesion on a follow-up MRI of the lumbar spine. 2. There is mild lower lumbar spondylosis. At L4-L5, there is mild right and mild-to-moderate left facet overgrowth, mild disc space narrowing, posterior central small disc protrusion indents the anterior midline of the thecal sac mildly narrowing the central portion of the thecal sac, comes close to the anteromedial aspect of the traversing L5 nerve roots, but does not abut or compress them. There is minimal if any foraminal narrowing. 3. At L5-S1, there is moderate bilateral facet overgrowth, 5 mm degenerative anterolisthesis of L5 with respect to S1. There is some narrowing of the lateral recesses  bilaterally, could compromise the traversing S1 nerve roots. There is loss of vertical height, foraminal bulging, disc material into the inferior right foramen, synovial thickening off the facets extending to the posterior foramen, and there is mild-to-moderate bilateral foraminal narrowing.  This report was finalized on 5/28/2021 6:59 AM by Dr. Raphael Kingston M.D.       6/22/2020 General  CBC-WBC 4.7, hemoglobin 12.9, platelets 187,000  CMP unremarkable with a creatinine of 0.93    Assessment/Plan      *Bilateral breast cancer  · Invasive ductal carcinoma in both right and left breast and lesions measure under 1 cm.  No palpable lymphadenopathy although unsure if this has been evaluated by an axillary ultrasound.  No abnormal axillary lymphadenopathy noted on PET.  · Most likely clinical T1b N0 M0, both cancers were noted to be grade 2, ER/IN positive and HER-2 negative and Ki-67 less than 15%  VATS on 4/30/2021  Biopsy confirms adenocarcinoma of pulmonary primary  Discussed with Dr. Molina about delaying breast surgery and she is in agreement  We discussed starting neoadjuvant endocrine therapy with anastrozole 1 mg p.o. daily  · Patient experienced significant adverse effects with anastrozole  · Treatment changed to letrozole   · Axillary ultrasound 4/20/2021 - for any axillary lymphadenopathy  · Started letrozole in May 2021  · She is tolerating letrozole fairly well.  · Reports being compliant with the same  · The right breast hematoma is relatively unchanged at 4 cm.  No other palpable abnormalities of either breast.    *Adenocarcinoma of the left lung  · Most likely stage T1 N2 M0, stage III  · MRI of the thoracic spine ordered to further evaluate the abnormality seen on PET  · MRI of the brain negative   · Given that she is only 55 and fairly good performance status I discussed with her by with concurrent chemoradiation with cisplatin and Alimta every 3 weeks followed by durvalumab.  · Adverse effects  including but not limited to myelosuppression, increased risk of infections, nausea, vomiting, renal dysfunction, ototoxicity, neurotoxicity have been discussed at length.  · She has been recommended to start folic acid and she will be administered B12 injection today.  · MRI of the thoracic spine performed 5/24/2021 shows no evidence of metastatic disease in the thoracic spine however in L1 there is a 7 mm lesion which is indeterminate.  A lumbar spine MRI has been recommended.  · Dr. Snell personally discussed her case with Dr. Oates and he feels like MRI of the lumbar spine may also show that this lesion is indeterminate.  · Discussed the same with the patient and her .  · Even if this is metastatic disease this might be the only lesion of metastatic disease and then would consider this oligometastatic disease.  · Therefore plan to proceed with concurrent chemoradiation as scheduled.  · Labs reviewed and she is okay to proceed with cisplatin and Alimta, C1D1 5/25/2021  · Day 1 of radiation 5/25/2021  · 5/27/2021-MRI of the lumbar spine-there is a 6 x 5 x 6 mm enhancing lesion in the left posterior body of L1 vertebra.  This is considered indeterminate.  Follow-up recommended.  Degenerative changes noted at L5-S1  · Cycle 2-day 8 today.  He received normal saline.  · Some nausea after cycle 2 which improved with Zofran.  · No mucositis.  · The left abdominal discomfort and chest wall pain have improved.    *Lumbar spine lesion-L1 lesion indeterminate.  MRI of the lumbar spine spine images independently reviewed and interpreted by me in detail summarized above.  Plan to repeat MRI in 4 months.    *Bilateral breast cancer-no family history of breast cancer but given synchronous breast cancer genetic testing has been sent.  Genetic testing with a variant of unknown significance.    *Anxiety-she is on several different medications currently.  Referral has been made to supportive oncology clinic today.  She is  requesting an increase in the dose of the Xanax.  Okay to increase the dose to 1 mg nightly to help with sleep.  Further management of these medications will be deferred to supportive oncology once she has established care with them.  He has been started on gabapentin.  Continue follow-up with supportive oncology  Improved    *Left-sided chest wall pain-secondary to VATS.  Most likely neuropathic.  Continue gabapentin and Norco as needed.  Improved    *Abdominal bloating-likely secondary to ongoing radiation and gastritis.  Improved with twice daily omeprazole and Gas-X.    *Hypertension-continue lisinopril.  Blood pressure 117/80    *Smoking-she has not smoked in the past 2 days.  Trying to quit smoking.  Congratulated on her efforts.    PLAN:  1.  Proceed with cisplatin and Alimta cycle 2  2.continue daily radiation  3.continue letrozole  4.omeprazole twice daily  5.follow-up-3 weeks     The patient is on high risk medication that requires close monitoring for toxicity.

## 2021-06-23 PROCEDURE — 77427 RADIATION TX MANAGEMENT X5: CPT | Performed by: RADIOLOGY

## 2021-06-23 PROCEDURE — 77014 CHG CT GUIDANCE RADIATION THERAPY FLDS PLACEMENT: CPT | Performed by: RADIOLOGY

## 2021-06-23 PROCEDURE — 77386 CHG INTENSITY MODULATED RADIATION TX DLVR COMPLEX: CPT | Performed by: RADIOLOGY

## 2021-06-23 PROCEDURE — 77386: CPT | Performed by: RADIOLOGY

## 2021-06-23 NOTE — PROGRESS NOTES
Physician Weekly Management Note    Diagnosis:    1. Malignant neoplasm of upper-outer quadrant of right breast in female, estrogen receptor positive (CMS/HCC)     cT1, cN2, cM0    Reason for Visit:  Weekly Status Check (20/33)    Concurrent Chemo:   Yes    Notes on Treatment course, Films, Medical progress and Plan:  Doing well. Breathing stable, cough improved. No N/V. Skin fine. No problems or questions, cont on.    Performance Status: (1) Restricted in physically strenuous activity, ambulatory and able to do work of light nature  Subjective     ROS - Other than as listed above, as follows:  Constitutional - Normal - no complaints of fatigue, denies lack of appetite, fever, night sweats or change in weight.  Neck - Normal - denies neck masses, muscle weakness, neck pain, decreased range of motion or swelling.  Cardiovascular - Normal - denies arrhythmias, chest pain, dyspnea, edema, orthopnea or palpitations.  Respiratory - Normal - denies cough, dyspnea, hemoptysis, hiccoughs, pleuritic chest pain or wheezing.  Gastrointestinal - Normal - no complaints of constipation, abdominal pain, diarrhea, heartburn/dyspepsia, hematemesis, hemorrhoids, melena or GI bleeding, nausea, pain or cramping or vomiting.     There were no vitals filed for this visit.  Objective     PHYSICAL EXAM - Other than listed above, as follows:  Constitutional - Normal - no evidence of impaired alertness, inadequate appearance, premature or advanced chronologic age, uncooperativeness, altered mood, affect or disorientation.  Neck - Normal - no evidence of tender or enlarged lymph nodes, neck abnormalities, restricted range of motion or enlarged thyroid.  Chest - Normal - no evidence of chest abnormalities, tender or enlarged lymph nodes.  Respiratory - Normal - no evidence of abnormal breat sounds, chest abnormalities on palpation and chest abnormalities on percussion and normal breath sounds.  Hematologic/Lymphatic - Normal - no evidence of  "tender or enlarged axillary lymph nodes nor tender or enlarged neck nodes.    Technical aspects reviewed:  Weekly OBI approved if applicable?  Yes  Weekly port films approved?  Yes  Change requests noted if applicable?  Yes  Patient setup and plan reviewed?  Yes    Chart Reviewed:  Continue current treatment plan?  Yes  Treatment plan change requested?  No    I have reviewed and marked as \"reviewed\" the current medications, allergies and problem list in the patients EMR.  I have reviewed the patient's medical, surgical  history in detail, reviewed any pertinent lab work  and updated the computerized patient record if needed.    Patient's Care Team:  Patient Care Team:  Vandana Gastelum MD as PCP - General (Family Medicine)  Vandana Gastelum MD as Referring Physician (Family Medicine)  Shell Thomson MD as Consulting Physician (Radiation Oncology)  "

## 2021-06-24 PROCEDURE — 77014 CHG CT GUIDANCE RADIATION THERAPY FLDS PLACEMENT: CPT | Performed by: RADIOLOGY

## 2021-06-24 PROCEDURE — 77386: CPT | Performed by: RADIOLOGY

## 2021-06-24 PROCEDURE — 77386 CHG INTENSITY MODULATED RADIATION TX DLVR COMPLEX: CPT | Performed by: RADIOLOGY

## 2021-06-25 ENCOUNTER — RADIATION ONCOLOGY WEEKLY ASSESSMENT (OUTPATIENT)
Dept: RADIATION ONCOLOGY | Facility: HOSPITAL | Age: 56
End: 2021-06-25

## 2021-06-25 DIAGNOSIS — C34.12 PRIMARY ADENOCARCINOMA OF UPPER LOBE OF LEFT LUNG (HCC): Primary | ICD-10-CM

## 2021-06-25 PROCEDURE — 77014 CHG CT GUIDANCE RADIATION THERAPY FLDS PLACEMENT: CPT | Performed by: RADIOLOGY

## 2021-06-25 PROCEDURE — 77386 CHG INTENSITY MODULATED RADIATION TX DLVR COMPLEX: CPT | Performed by: RADIOLOGY

## 2021-06-25 PROCEDURE — 77386: CPT | Performed by: RADIOLOGY

## 2021-06-25 NOTE — PROGRESS NOTES
Physician Weekly Management Note    Diagnosis:    1. Primary adenocarcinoma of upper lobe of left lung (CMS/HCC)     cT1, cN2, cM0    Reason for Visit:  Tx: 23/33 - 46/66 Gy    Concurrent Chemo:   Yes    Notes on Treatment course, Films, Medical progress and Plan:  Doing well. Looks great, states she feels good. Daughter having surgery on Monday - early morning so should still be able to come for treatment but will phone and let us know. Cont on.    Performance Status: (1) Restricted in physically strenuous activity, ambulatory and able to do work of light nature  Subjective     ROS - Other than as listed above, as follows:  Constitutional - Normal - no complaints of fatigue, denies lack of appetite, fever, night sweats or change in weight.  Neck - Normal - denies neck masses, muscle weakness, neck pain, decreased range of motion or swelling.  Cardiovascular - Normal - denies arrhythmias, chest pain, dyspnea, edema, orthopnea or palpitations.  Respiratory - Normal - denies cough, dyspnea, hemoptysis, hiccoughs, pleuritic chest pain or wheezing.  Gastrointestinal - Normal - no complaints of constipation, abdominal pain, diarrhea, heartburn/dyspepsia, hematemesis, hemorrhoids, melena or GI bleeding, nausea, pain or cramping or vomiting.     There were no vitals filed for this visit.  Objective     PHYSICAL EXAM - Other than listed above, as follows:  Constitutional - Normal - no evidence of impaired alertness, inadequate appearance, premature or advanced chronologic age, uncooperativeness, altered mood, affect or disorientation.  Neck - Normal - no evidence of tender or enlarged lymph nodes, neck abnormalities, restricted range of motion or enlarged thyroid.  Chest - Normal - no evidence of chest abnormalities, tender or enlarged lymph nodes.  Respiratory - Normal - no evidence of abnormal breat sounds, chest abnormalities on palpation and chest abnormalities on percussion and normal breath  "sounds.  Hematologic/Lymphatic - Normal - no evidence of tender or enlarged axillary lymph nodes nor tender or enlarged neck nodes.    Technical aspects reviewed:  Weekly OBI approved if applicable?  Yes  Weekly port films approved?  Yes  Change requests noted if applicable?  Yes  Patient setup and plan reviewed?  Yes    Chart Reviewed:  Continue current treatment plan?  Yes  Treatment plan change requested?  No    I have reviewed and marked as \"reviewed\" the current medications, allergies and problem list in the patients EMR.  I have reviewed the patient's medical, surgical  history in detail, reviewed any pertinent lab work  and updated the computerized patient record if needed.    Patient's Care Team:  Patient Care Team:  Vandana Gastelum MD as PCP - General (Family Medicine)  Vandana Gastelum MD as Referring Physician (Family Medicine)  Shell Thomson MD as Consulting Physician (Radiation Oncology)  "

## 2021-06-28 PROCEDURE — 77014 CHG CT GUIDANCE RADIATION THERAPY FLDS PLACEMENT: CPT | Performed by: RADIOLOGY

## 2021-06-28 PROCEDURE — 77386: CPT | Performed by: RADIOLOGY

## 2021-06-28 PROCEDURE — 77386 CHG INTENSITY MODULATED RADIATION TX DLVR COMPLEX: CPT | Performed by: RADIOLOGY

## 2021-06-29 ENCOUNTER — RADIATION ONCOLOGY WEEKLY ASSESSMENT (OUTPATIENT)
Dept: RADIATION ONCOLOGY | Facility: HOSPITAL | Age: 56
End: 2021-06-29

## 2021-06-29 DIAGNOSIS — C34.12 PRIMARY ADENOCARCINOMA OF UPPER LOBE OF LEFT LUNG (HCC): Primary | ICD-10-CM

## 2021-06-29 PROCEDURE — 77386 CHG INTENSITY MODULATED RADIATION TX DLVR COMPLEX: CPT | Performed by: RADIOLOGY

## 2021-06-29 PROCEDURE — 77386: CPT | Performed by: RADIOLOGY

## 2021-06-29 PROCEDURE — 77336 RADIATION PHYSICS CONSULT: CPT | Performed by: RADIOLOGY

## 2021-06-29 PROCEDURE — 77014 CHG CT GUIDANCE RADIATION THERAPY FLDS PLACEMENT: CPT | Performed by: RADIOLOGY

## 2021-06-29 NOTE — PROGRESS NOTES
Physician Weekly Management Note    Diagnosis:    1. Primary adenocarcinoma of upper lobe of left lung (CMS/HCC)     cT1, cN2, cM0    Reason for Visit:  No chief complaint on file.    Concurrent Chemo:   Yes    Notes on Treatment course, Films, Medical progress and Plan:  Doing well. Mild SOA with weather change/humidity but resting when needs to. Eating ok - esophagitis controlled. No problems or questions, cont on.    Performance Status: (1) Restricted in physically strenuous activity, ambulatory and able to do work of light nature  Subjective     ROS - Other than as listed above, as follows:  Constitutional - Normal - no complaints of fatigue, denies lack of appetite, fever, night sweats or change in weight.  Neck - Normal - denies neck masses, muscle weakness, neck pain, decreased range of motion or swelling.  Cardiovascular - Normal - denies arrhythmias, chest pain, dyspnea, edema, orthopnea or palpitations.  Respiratory - Normal - denies cough, dyspnea, hemoptysis, hiccoughs, pleuritic chest pain or wheezing.  Gastrointestinal - Normal - no complaints of constipation, abdominal pain, diarrhea, heartburn/dyspepsia, hematemesis, hemorrhoids, melena or GI bleeding, nausea, pain or cramping or vomiting.     There were no vitals filed for this visit.  Objective     PHYSICAL EXAM - Other than listed above, as follows:  Constitutional - Normal - no evidence of impaired alertness, inadequate appearance, premature or advanced chronologic age, uncooperativeness, altered mood, affect or disorientation.  Neck - Normal - no evidence of tender or enlarged lymph nodes, neck abnormalities, restricted range of motion or enlarged thyroid.  Chest - Normal - no evidence of chest abnormalities, tender or enlarged lymph nodes.  Respiratory - Normal - no evidence of abnormal breat sounds, chest abnormalities on palpation and chest abnormalities on percussion and normal breath sounds.  Hematologic/Lymphatic - Normal - no evidence  "of tender or enlarged axillary lymph nodes nor tender or enlarged neck nodes.    Technical aspects reviewed:  Weekly OBI approved if applicable?  Yes  Weekly port films approved?  Yes  Change requests noted if applicable?  Yes  Patient setup and plan reviewed?  Yes    Chart Reviewed:  Continue current treatment plan?  Yes  Treatment plan change requested?  No    I have reviewed and marked as \"reviewed\" the current medications, allergies and problem list in the patients EMR.  I have reviewed the patient's medical, surgical  history in detail, reviewed any pertinent lab work  and updated the computerized patient record if needed.    Patient's Care Team:  Patient Care Team:  Vandana Gastelum MD as PCP - General (Family Medicine)  Vandana Gastelum MD as Referring Physician (Family Medicine)  Shell Thomson MD as Consulting Physician (Radiation Oncology)  "

## 2021-06-30 PROCEDURE — 77386: CPT | Performed by: RADIOLOGY

## 2021-06-30 PROCEDURE — 77386 CHG INTENSITY MODULATED RADIATION TX DLVR COMPLEX: CPT | Performed by: RADIOLOGY

## 2021-06-30 PROCEDURE — 77427 RADIATION TX MANAGEMENT X5: CPT | Performed by: RADIOLOGY

## 2021-06-30 PROCEDURE — 77014 CHG CT GUIDANCE RADIATION THERAPY FLDS PLACEMENT: CPT | Performed by: RADIOLOGY

## 2021-07-01 ENCOUNTER — APPOINTMENT (OUTPATIENT)
Dept: RADIATION ONCOLOGY | Facility: HOSPITAL | Age: 56
End: 2021-07-01

## 2021-07-01 PROCEDURE — 77386 CHG INTENSITY MODULATED RADIATION TX DLVR COMPLEX: CPT | Performed by: RADIOLOGY

## 2021-07-01 PROCEDURE — 77014 CHG CT GUIDANCE RADIATION THERAPY FLDS PLACEMENT: CPT | Performed by: RADIOLOGY

## 2021-07-01 PROCEDURE — 77386: CPT | Performed by: RADIOLOGY

## 2021-07-02 PROCEDURE — 77386: CPT | Performed by: RADIOLOGY

## 2021-07-02 PROCEDURE — 77014 CHG CT GUIDANCE RADIATION THERAPY FLDS PLACEMENT: CPT | Performed by: RADIOLOGY

## 2021-07-02 PROCEDURE — 77386 CHG INTENSITY MODULATED RADIATION TX DLVR COMPLEX: CPT | Performed by: RADIOLOGY

## 2021-07-06 PROCEDURE — 77386: CPT | Performed by: RADIOLOGY

## 2021-07-06 PROCEDURE — 77386 CHG INTENSITY MODULATED RADIATION TX DLVR COMPLEX: CPT | Performed by: RADIOLOGY

## 2021-07-06 PROCEDURE — 77014 CHG CT GUIDANCE RADIATION THERAPY FLDS PLACEMENT: CPT | Performed by: RADIOLOGY

## 2021-07-06 PROCEDURE — 77336 RADIATION PHYSICS CONSULT: CPT | Performed by: RADIOLOGY

## 2021-07-06 NOTE — PROGRESS NOTES
Subjective   Holli Patel is a 56 y.o. female.  Referred by Dr. 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    3/1/2021-bilateral screening mammogram-microcalcifications seen in the posterior one third of the lateral aspect of the right breast.  Area of focal asymmetry seen in the middle one third of the upper inner quadrant of the left breast.    3/1/2021-DEXA scan-T score of -2.2 on the lumbar spine and T score of -2.3 in the left hip and T score of -1.9 in the right hip.  Findings consistent with osteopenia    3/23/2021-screening lung CT-there is a 10 x 11 mm solid nodule in the left upper lobe.  Enlarged AP window lymph node measuring 14 x 10 mm.  Suggestion of a possible 9 mm left hilar lymph node.  Heavy coronary artery calcification.    3/23/2021-diagnostic mammogram bilateral-cluster of microcalcifications in the middle third lateral aspect of the right breast.  Left breast demonstrates persistence of the area of focal asymmetry in the region.    Ultrasound-left breast ultrasound at 10 o'clock position, 6 cm from the nipple there is a 0.4 cm irregular hypoechoic lesion.  Stereotactic biopsy of the right breast calcifications recommended.  Ultrasound-guided biopsy of the left breast lesion recommended    4/7/2021-right breast ear tactic biopsy and left breast ultrasound-guided biopsy  Pathology  Right breast-invasive ductal carcinoma, grade 2, lymphovascular invasion present, ER +99% strong, NH +80% moderate, HER-2 negative, Ki-67 12%  Left breast upper inner quadrant 10 o'clock position biopsy, invasive ductal carcinoma, grade   2, ER +99% strong, NH +40% strong, HER-2 2+ on immunohistochemistry, nonamplified on FISH Ki-67 10%    4/14/2021-PET/CT-FDG avid aortopulmonary window lymph node measures 0.9 cm with an SUV of 7.5.  FDG avid left hilar lymph node  measures 1 cm with an SUV of 17.2.  Irregular soft tissue density in the right breast measuring 3.7 x 3.8 cm is favored to be secondary to the recent breast biopsy.  1.1 cm pulmonary nodule within the left upper lobe with SUV 9.7 .  Sub-6 mm pulmonary nodules are present in the right lower lobe.  No evidence of lymphadenopathy or metastatic disease in the abdomen.  Focal area of FDG uptake within the central canal posterior to T11-T12 displaced demonstrating an SUV of 5.5 thought to be reactive however MRI is recommended for further evaluation.    She was seen by Dr. Molina who initially planned for breast surgery however  due to the PET abnormalities she has been referred to Dr. Kerr who plans to do a wound on 4/30/2021.  Dr. Molina's and Dr. Kerr's notes reviewed.    Patient denies any family history of breast cancer.  Her mother had lymphoma.  Maternal grandmother had colon cancer.  Prior to that screening mammogram she did not have any palpable abnormalities of either breast.    She has been a heavy smoker for about 40 years and smoked 2 packs/day.  Denies any recent weight changes, new bone pains, cough, abdominal pain nausea vomiting constipation or diarrhea.  She does have anxiety and has been on several medications.  She has recently been started on Xanax to help with the mood and also with insomnia.    Patient had a video-assisted thoracoscopy and mediastinal lymphadenectomy on 4/30/2021.  L5-L6 lymph nodes were biopsied however the small pulmonary nodule in the left upper lobe was not difficult to find.  Pathology showed moderately differentiated adenocarcinoma which is CK7 and TTF-1 positive.  Consistent with lung primary.  Ki-67 85%.    5/14/2021-MRI of the brain-minimal chronic small vessel ischemic change in the white matter.  Otherwise negative MRI.    5/20/2021-bilateral axillary ultrasound-no evidence of axillary lymphadenopathy in either axilla.  Normal-appearing bilateral axillary lymph  nodes visualized.    Cycle 1 cisplatin and Alimta on 5/25/2021.    Cycle 2 cisplatin Alimta 6/15/2021    Interval history  Patient returns today for follow up and evaluation prior to cycle 3 Cisplatin Alimta.  She continues on concurrent radiation.  She reports feeling reasonably well today.  She is eating and drinking adequately.  She continues on Zofran 3 times daily for nausea, and feels that her nausea is adequately controlled.  Her bowels are moving regularly with the use of Senokot-S.  She complains of reflux, despite the use of Losec 20 mg twice daily.  She denies arthralgias or myalgias.  She denies fever or chills.  Denies any signs or symptoms of peripheral neuropathy.    The following portions of the patient's history were reviewed and updated as appropriate: allergies, current medications, past family history, past medical history, past social history, past surgical history and problem list.    Past Medical History:   Diagnosis Date   • Anxiety    • Dyspnea on exertion    • SHAYY (generalized anxiety disorder)     RELATED HIGH BLOOD PRESSURE   • GERD (gastroesophageal reflux disease)    • High blood pressure     ANXIETY RELATED   • Hyperlipidemia    • Hypothyroidism    • Insomnia    • Lung cancer (CMS/HCC)    • Lung nodule    • Malignant neoplasm of upper-outer quadrant of right breast in female, estrogen receptor positive (CMS/HCC) 4/13/2021   • Migraine    • PONV (postoperative nausea and vomiting)         Past Surgical History:   Procedure Laterality Date   • BREAST BIOPSY Bilateral 04/07/2021    Right Breast 10 o'clock & Left breast 10 o'clock 6 cm from nipple, [facility]   • CLOSED REDUCTION HIP DISLOCATION Left 4/30/2021    Procedure: BRONCHOSCOPY, LEFT VIDEO ASSITED THORACOSCOPY, ROBOTIC ASSSITED MEDIASTINAL LYMPHADENECTOMY WITH INTERCOSTAL NERVE BLOCKS;  Surgeon: Raphael Kerr III, MD;  Location: [facility];  Service: [facility];  Laterality: Left;   • COLONOSCOPY     • VENOUS ACCESS DEVICE (PORT)  INSERTION Right 2021    Procedure: INSERTION VENOUS ACCESS DEVICE;  Surgeon: Raphael Kerr III, MD;  Location: [facility] MAIN OR;  Service: Thoracic;  Laterality: Right;   • WRIST SURGERY Right         Family History   Problem Relation Age of Onset   • Cancer Father         LYMPHATIC   • Alcohol abuse Brother    • Colon cancer Maternal Grandmother    • Malig Hyperthermia Neg Hx         Social History     Socioeconomic History   • Marital status:      Spouse name: Not on file   • Number of children: Not on file   • Years of education: Not on file   • Highest education level: Not on file   Tobacco Use   • Smoking status: Current Every Day Smoker     Packs/day: 1.00     Types: Electronic Cigarette, Cigarettes     Start date: 1981   • Smokeless tobacco: Never Used   • Tobacco comment: ABT 15 CIGARETTES DAILY   Vaping Use   • Vaping Use: Never used   Substance and Sexual Activity   • Alcohol use: Never   • Drug use: Never   • Sexual activity: Yes        OB History        2    Para   2    Term   2            AB        Living           SAB        TAB        Ectopic        Molar        Multiple        Live Births                Age of menarche-10  Age at menopause-42  Oral contraceptive pill use-15 years  No HRT use  She has 2 children  Age at first live childbirth-17    No Known Allergies     Review of Systems   Review of systems as mentioned in the HPI    Objective   not currently breastfeeding.     Vitals:  Temperature 98.7, blood pressure 130/89, heart rate 84, respirations 16, oxygen saturation 96%, weight 80.8 kg.    Physical Exam  Vitals reviewed.   Constitutional:       Appearance: She is obese.   HENT:      Head: Normocephalic.      Nose: Nose normal.      Mouth/Throat:      Mouth: Mucous membranes are moist.      Pharynx: Oropharynx is clear.   Eyes:      Extraocular Movements: Extraocular movements intact.      Conjunctiva/sclera: Conjunctivae normal.      Pupils: Pupils are equal,  round, and reactive to light.   Cardiovascular:      Rate and Rhythm: Normal rate and regular rhythm.      Pulses: Normal pulses.      Heart sounds: Normal heart sounds.   Pulmonary:      Effort: Pulmonary effort is normal.      Breath sounds: Normal breath sounds.   Abdominal:      General: Abdomen is flat. Bowel sounds are normal. There is no distension.      Palpations: There is no mass.   Musculoskeletal:         General: Normal range of motion.      Cervical back: Normal range of motion.   Skin:     General: Skin is warm.   Neurological:      General: No focal deficit present.      Mental Status: She is alert and oriented to person, place, and time.   Psychiatric:         Mood and Affect: Mood normal.         Behavior: Behavior normal.         Thought Content: Thought content normal.         Judgment: Judgment normal.     Breast Exam: Right breast with a large postbiopsy hematoma in the upper outer quadrant measuring 4 cm.  Visible ecchymosis.  No other palpable abnormalities.  Left breast with no palpable abnormalities.  No right or left axillary lymphadenopathy    I have reexamined the patient and the results are consistent with the previously documented exam. KORI Guerrero     Infusion on 06/22/2021   Component Date Value Ref Range Status   • Glucose 06/22/2021 109  74 - 124 mg/dL Final   • BUN 06/22/2021 16  6 - 20 mg/dL Final   • Creatinine 06/22/2021 0.93  0.60 - 1.10 mg/dL Final   • Sodium 06/22/2021 139  134 - 145 mmol/L Final   • Potassium 06/22/2021 4.3  3.5 - 4.7 mmol/L Final   • Chloride 06/22/2021 101  98 - 107 mmol/L Final   • CO2 06/22/2021 25.6  22.0 - 29.0 mmol/L Final   • Calcium 06/22/2021 9.2  8.5 - 10.2 mg/dL Final   • Total Protein 06/22/2021 6.8  6.3 - 8.0 g/dL Final   • Albumin 06/22/2021 4.00  3.50 - 5.20 g/dL Final   • ALT (SGPT) 06/22/2021 21  0 - 33 U/L Final   • AST (SGOT) 06/22/2021 11  0 - 32 U/L Final   • Alkaline Phosphatase 06/22/2021 102  38 - 116 U/L Final   • Total  Bilirubin 06/22/2021 0.3  0.2 - 1.2 mg/dL Final   • eGFR Non  Amer 06/22/2021 62  >60 mL/min/1.73 Final   • Globulin 06/22/2021 2.8  1.8 - 3.5 gm/dL Final   • A/G Ratio 06/22/2021 1.4  1.1 - 2.4 g/dL Final   • BUN/Creatinine Ratio 06/22/2021 17.2  7.3 - 30.0 Final   • Anion Gap 06/22/2021 12.4  5.0 - 15.0 mmol/L Final   • WBC 06/22/2021 4.70  3.40 - 10.80 10*3/mm3 Final   • RBC 06/22/2021 4.87  3.77 - 5.28 10*6/mm3 Final   • Hemoglobin 06/22/2021 12.9  12.0 - 15.9 g/dL Final   • Hematocrit 06/22/2021 41.5  34.0 - 46.6 % Final   • MCV 06/22/2021 85.2  79.0 - 97.0 fL Final   • MCH 06/22/2021 26.5* 26.6 - 33.0 pg Final   • MCHC 06/22/2021 31.1* 31.5 - 35.7 g/dL Final   • RDW 06/22/2021 14.2  12.3 - 15.4 % Final   • RDW-SD 06/22/2021 43.1  37.0 - 54.0 fl Final   • MPV 06/22/2021 9.3  6.0 - 12.0 fL Final   • Platelets 06/22/2021 187  140 - 450 10*3/mm3 Final   • Neutrophil % 06/22/2021 64.8  42.7 - 76.0 % Final   • Lymphocyte % 06/22/2021 24.5  19.6 - 45.3 % Final   • Monocyte % 06/22/2021 6.6  5.0 - 12.0 % Final   • Eosinophil % 06/22/2021 2.6  0.3 - 6.2 % Final   • Basophil % 06/22/2021 0.6  0.0 - 1.5 % Final   • Immature Grans % 06/22/2021 0.9* 0.0 - 0.5 % Final   • Neutrophils, Absolute 06/22/2021 3.05  1.70 - 7.00 10*3/mm3 Final   • Lymphocytes, Absolute 06/22/2021 1.15  0.70 - 3.10 10*3/mm3 Final   • Monocytes, Absolute 06/22/2021 0.31  0.10 - 0.90 10*3/mm3 Final   • Eosinophils, Absolute 06/22/2021 0.12  0.00 - 0.40 10*3/mm3 Final   • Basophils, Absolute 06/22/2021 0.03  0.00 - 0.20 10*3/mm3 Final   • Immature Grans, Absolute 06/22/2021 0.04  0.00 - 0.05 10*3/mm3 Final   • nRBC 06/22/2021 0.0  0.0 - 0.2 /100 WBC Final   Infusion on 06/15/2021   Component Date Value Ref Range Status   • Glucose 06/15/2021 109  74 - 124 mg/dL Final   • BUN 06/15/2021 12  6 - 20 mg/dL Final   • Creatinine 06/15/2021 1.00  0.60 - 1.10 mg/dL Final   • Sodium 06/15/2021 141  134 - 145 mmol/L Final   • Potassium 06/15/2021 4.5   3.5 - 4.7 mmol/L Final   • Chloride 06/15/2021 103  98 - 107 mmol/L Final   • CO2 06/15/2021 25.9  22.0 - 29.0 mmol/L Final   • Calcium 06/15/2021 9.2  8.5 - 10.2 mg/dL Final   • Total Protein 06/15/2021 7.1  6.3 - 8.0 g/dL Final   • Albumin 06/15/2021 3.90  3.50 - 5.20 g/dL Final   • ALT (SGPT) 06/15/2021 10  0 - 33 U/L Final   • AST (SGOT) 06/15/2021 13  0 - 32 U/L Final   • Alkaline Phosphatase 06/15/2021 121* 38 - 116 U/L Final   • Total Bilirubin 06/15/2021 <0.2* 0.2 - 1.2 mg/dL Final   • eGFR Non African Amer 06/15/2021 57* >60 mL/min/1.73 Final   • Globulin 06/15/2021 3.2  1.8 - 3.5 gm/dL Final   • A/G Ratio 06/15/2021 1.2  1.1 - 2.4 g/dL Final   • BUN/Creatinine Ratio 06/15/2021 12.0  7.3 - 30.0 Final   • Anion Gap 06/15/2021 12.1  5.0 - 15.0 mmol/L Final   • Magnesium 06/15/2021 1.7* 1.8 - 2.5 mg/dL Final   • WBC 06/15/2021 6.94  3.40 - 10.80 10*3/mm3 Final   • RBC 06/15/2021 4.35  3.77 - 5.28 10*6/mm3 Final   • Hemoglobin 06/15/2021 11.7* 12.0 - 15.9 g/dL Final   • Hematocrit 06/15/2021 38.0  34.0 - 46.6 % Final   • MCV 06/15/2021 87.4  79.0 - 97.0 fL Final   • MCH 06/15/2021 26.9  26.6 - 33.0 pg Final   • MCHC 06/15/2021 30.8* 31.5 - 35.7 g/dL Final   • RDW 06/15/2021 14.6  12.3 - 15.4 % Final   • RDW-SD 06/15/2021 46.3  37.0 - 54.0 fl Final   • MPV 06/15/2021 8.9  6.0 - 12.0 fL Final   • Platelets 06/15/2021 459* 140 - 450 10*3/mm3 Final   • Neutrophil % 06/15/2021 49.7  42.7 - 76.0 % Final   • Lymphocyte % 06/15/2021 23.9  19.6 - 45.3 % Final   • Monocyte % 06/15/2021 17.6* 5.0 - 12.0 % Final   • Eosinophil % 06/15/2021 3.9  0.3 - 6.2 % Final   • Basophil % 06/15/2021 1.7* 0.0 - 1.5 % Final   • Immature Grans % 06/15/2021 3.2* 0.0 - 0.5 % Final   • Neutrophils, Absolute 06/15/2021 3.45  1.70 - 7.00 10*3/mm3 Final   • Lymphocytes, Absolute 06/15/2021 1.66  0.70 - 3.10 10*3/mm3 Final   • Monocytes, Absolute 06/15/2021 1.22* 0.10 - 0.90 10*3/mm3 Final   • Eosinophils, Absolute 06/15/2021 0.27  0.00 - 0.40  10*3/mm3 Final   • Basophils, Absolute 06/15/2021 0.12  0.00 - 0.20 10*3/mm3 Final   • Immature Grans, Absolute 06/15/2021 0.22* 0.00 - 0.05 10*3/mm3 Final   • nRBC 06/15/2021 0.0  0.0 - 0.2 /100 WBC Final        No radiology results for the last 30 days.   7/7/2021  CBC-WBC 8.20, hemoglobin 11.0, platelets 360,000.  CMP unremarkable with a creatinine of 0. 8 7.    Assessment/Plan      *Bilateral breast cancer  · Invasive ductal carcinoma in both right and left breast and lesions measure under 1 cm.  No palpable lymphadenopathy although unsure if this has been evaluated by an axillary ultrasound.  No abnormal axillary lymphadenopathy noted on PET.  · Most likely clinical T1b N0 M0, both cancers were noted to be grade 2, ER/VA positive and HER-2 negative and Ki-67 less than 15%  VATS on 4/30/2021  Biopsy confirms adenocarcinoma of pulmonary primary  Discussed with Dr. Molina about delaying breast surgery and she is in agreement  We discussed starting neoadjuvant endocrine therapy with anastrozole 1 mg p.o. daily  · Patient experienced significant adverse effects with anastrozole  · Treatment changed to letrozole   · Axillary ultrasound 4/20/2021 - for any axillary lymphadenopathy  · Started letrozole in May 2021  · She is tolerating letrozole fairly well.  · Reports being compliant with the same  · The right breast hematoma is relatively unchanged at 4 cm.  No other palpable abnormalities of either breast.  · 7/7/2021, continues on letrozole 2.5 mg daily, and continues to tolerate this reasonably well.    *Adenocarcinoma of the left lung  · Most likely stage T1 N2 M0, stage III  · MRI of the thoracic spine ordered to further evaluate the abnormality seen on PET  · MRI of the brain negative   · Given that she is only 55 and fairly good performance status I discussed with her by with concurrent chemoradiation with cisplatin and Alimta every 3 weeks followed by durvalumab.  · Adverse effects including but not limited  to myelosuppression, increased risk of infections, nausea, vomiting, renal dysfunction, ototoxicity, neurotoxicity have been discussed at length.  · She has been recommended to start folic acid and she will be administered B12 injection today.  · MRI of the thoracic spine performed 5/24/2021 shows no evidence of metastatic disease in the thoracic spine however in L1 there is a 7 mm lesion which is indeterminate.  A lumbar spine MRI has been recommended.  · Dr. Snell personally discussed her case with Dr. Oates and he feels like MRI of the lumbar spine may also show that this lesion is indeterminate.  · Discussed the same with the patient and her .  · Even if this is metastatic disease this might be the only lesion of metastatic disease and then would consider this oligometastatic disease.  · Therefore plan to proceed with concurrent chemoradiation as scheduled.  · Labs reviewed and she is okay to proceed with cisplatin and Alimta, C1D1 5/25/2021  · Day 1 of radiation 5/25/2021  · 5/27/2021-MRI of the lumbar spine-there is a 6 x 5 x 6 mm enhancing lesion in the left posterior body of L1 vertebra.  This is considered indeterminate.  Follow-up recommended.  Degenerative changes noted at L5-S1  · 7/7/2021, patient continues to tolerate treatment reasonably well.  Proceed with cycle 3 day 1 cisplatin Alimta today.    *Lumbar spine lesion-L1 lesion indeterminate.  MRI of the lumbar spine spine images independently reviewed and interpreted by me in detail summarized above.  Plan to repeat MRI in 4 months.    *Bilateral breast cancer-no family history of breast cancer but given synchronous breast cancer genetic testing has been sent.  Genetic testing with a variant of unknown significance.    *Anxiety-she is on several different medications currently.  Referral has been made to supportive oncology clinic today.  She is requesting an increase in the dose of the Xanax.  Okay to increase the dose to 1 mg nightly to  help with sleep.  Further management of these medications will be deferred to supportive oncology once she has established care with them.  He has been started on gabapentin.  Continue follow-up with supportive oncology  Improved    *Left-sided chest wall pain-secondary to VATS.  Most likely neuropathic.  Continue gabapentin and Norco as needed.  Improved    *Abdominal bloating-likely secondary to ongoing radiation and gastritis.  Improved with twice daily omeprazole and Gas-X.    *Hypertension-continue lisinopril.  Blood pressure today 130/89.    *Smoking-she has not smoked in the past 2 days.  Trying to quit smoking.  Congratulated on her efforts.    *GERD  · 7/7/2021, patient complains of reflux despite the use of Prilosec 20 mg twice daily.  We will send her a prescription for Carafate slurry 4 times daily.    PLAN:  1.  Proceed with cisplatin and Alimta cycle 3  2. Continue daily radiation to the direction of radiation oncology.  3. Continue letrozole 2.5 mg daily.  4. omeprazole he milligrams twice daily.  5.  Start Carafate 4 times daily.  Prescription sent to the pharmacy today.  6. Return in 3 weeks for MD follow up and cycle 4 Cisplatin and Alimta     The patient is on high risk medication that requires close monitoring for toxicity.    Brenda Herron, KORI  07/07/2021

## 2021-07-07 ENCOUNTER — RADIATION ONCOLOGY WEEKLY ASSESSMENT (OUTPATIENT)
Dept: RADIATION ONCOLOGY | Facility: HOSPITAL | Age: 56
End: 2021-07-07

## 2021-07-07 ENCOUNTER — INFUSION (OUTPATIENT)
Dept: ONCOLOGY | Facility: HOSPITAL | Age: 56
End: 2021-07-07

## 2021-07-07 ENCOUNTER — OFFICE VISIT (OUTPATIENT)
Dept: ONCOLOGY | Facility: CLINIC | Age: 56
End: 2021-07-07

## 2021-07-07 VITALS
RESPIRATION RATE: 16 BRPM | SYSTOLIC BLOOD PRESSURE: 130 MMHG | HEART RATE: 84 BPM | TEMPERATURE: 98.7 F | BODY MASS INDEX: 31.55 KG/M2 | HEIGHT: 63 IN | WEIGHT: 178.1 LBS | DIASTOLIC BLOOD PRESSURE: 89 MMHG | OXYGEN SATURATION: 96 %

## 2021-07-07 DIAGNOSIS — C34.12 PRIMARY ADENOCARCINOMA OF UPPER LOBE OF LEFT LUNG (HCC): Primary | ICD-10-CM

## 2021-07-07 DIAGNOSIS — Z79.899 HIGH RISK MEDICATION USE: ICD-10-CM

## 2021-07-07 DIAGNOSIS — C50.212 MALIGNANT NEOPLASM OF UPPER-INNER QUADRANT OF LEFT BREAST IN FEMALE, ESTROGEN RECEPTOR POSITIVE (HCC): ICD-10-CM

## 2021-07-07 DIAGNOSIS — Z87.19 HISTORY OF GASTROESOPHAGEAL REFLUX (GERD): ICD-10-CM

## 2021-07-07 DIAGNOSIS — Z17.0 MALIGNANT NEOPLASM OF UPPER-INNER QUADRANT OF LEFT BREAST IN FEMALE, ESTROGEN RECEPTOR POSITIVE (HCC): ICD-10-CM

## 2021-07-07 DIAGNOSIS — Z45.2 ENCOUNTER FOR FITTING AND ADJUSTMENT OF VASCULAR CATHETER: ICD-10-CM

## 2021-07-07 LAB
ALBUMIN SERPL-MCNC: 4.1 G/DL (ref 3.5–5.2)
ALBUMIN/GLOB SERPL: 1.5 G/DL (ref 1.1–2.4)
ALP SERPL-CCNC: 124 U/L (ref 38–116)
ALT SERPL W P-5'-P-CCNC: 12 U/L (ref 0–33)
ANION GAP SERPL CALCULATED.3IONS-SCNC: 10.2 MMOL/L (ref 5–15)
AST SERPL-CCNC: 12 U/L (ref 0–32)
BASOPHILS # BLD AUTO: 0.04 10*3/MM3 (ref 0–0.2)
BASOPHILS NFR BLD AUTO: 0.5 % (ref 0–1.5)
BILIRUB SERPL-MCNC: <0.2 MG/DL (ref 0.2–1.2)
BUN SERPL-MCNC: 13 MG/DL (ref 6–20)
BUN/CREAT SERPL: 14.9 (ref 7.3–30)
CALCIUM SPEC-SCNC: 9.8 MG/DL (ref 8.5–10.2)
CHLORIDE SERPL-SCNC: 104 MMOL/L (ref 98–107)
CO2 SERPL-SCNC: 27.8 MMOL/L (ref 22–29)
CREAT SERPL-MCNC: 0.87 MG/DL (ref 0.6–1.1)
DEPRECATED RDW RBC AUTO: 49.5 FL (ref 37–54)
EOSINOPHIL # BLD AUTO: 0.05 10*3/MM3 (ref 0–0.4)
EOSINOPHIL NFR BLD AUTO: 0.6 % (ref 0.3–6.2)
ERYTHROCYTE [DISTWIDTH] IN BLOOD BY AUTOMATED COUNT: 16.2 % (ref 12.3–15.4)
GFR SERPL CREATININE-BSD FRML MDRD: 67 ML/MIN/1.73
GLOBULIN UR ELPH-MCNC: 2.8 GM/DL (ref 1.8–3.5)
GLUCOSE SERPL-MCNC: 114 MG/DL (ref 74–124)
HCT VFR BLD AUTO: 35.3 % (ref 34–46.6)
HGB BLD-MCNC: 11 G/DL (ref 12–15.9)
IMM GRANULOCYTES # BLD AUTO: 0.27 10*3/MM3 (ref 0–0.05)
IMM GRANULOCYTES NFR BLD AUTO: 3.3 % (ref 0–0.5)
LYMPHOCYTES # BLD AUTO: 1.3 10*3/MM3 (ref 0.7–3.1)
LYMPHOCYTES NFR BLD AUTO: 15.9 % (ref 19.6–45.3)
MAGNESIUM SERPL-MCNC: 1.7 MG/DL (ref 1.8–2.5)
MCH RBC QN AUTO: 26.9 PG (ref 26.6–33)
MCHC RBC AUTO-ENTMCNC: 31.2 G/DL (ref 31.5–35.7)
MCV RBC AUTO: 86.3 FL (ref 79–97)
MONOCYTES # BLD AUTO: 1.02 10*3/MM3 (ref 0.1–0.9)
MONOCYTES NFR BLD AUTO: 12.4 % (ref 5–12)
NEUTROPHILS NFR BLD AUTO: 5.52 10*3/MM3 (ref 1.7–7)
NEUTROPHILS NFR BLD AUTO: 67.3 % (ref 42.7–76)
NRBC BLD AUTO-RTO: 0.2 /100 WBC (ref 0–0.2)
PLATELET # BLD AUTO: 360 10*3/MM3 (ref 140–450)
PMV BLD AUTO: 8.8 FL (ref 6–12)
POTASSIUM SERPL-SCNC: 4.7 MMOL/L (ref 3.5–4.7)
PROT SERPL-MCNC: 6.9 G/DL (ref 6.3–8)
RBC # BLD AUTO: 4.09 10*6/MM3 (ref 3.77–5.28)
SODIUM SERPL-SCNC: 142 MMOL/L (ref 134–145)
WBC # BLD AUTO: 8.2 10*3/MM3 (ref 3.4–10.8)

## 2021-07-07 PROCEDURE — 36593 DECLOT VASCULAR DEVICE: CPT

## 2021-07-07 PROCEDURE — 96413 CHEMO IV INFUSION 1 HR: CPT

## 2021-07-07 PROCEDURE — 99214 OFFICE O/P EST MOD 30 MIN: CPT | Performed by: NURSE PRACTITIONER

## 2021-07-07 PROCEDURE — 96375 TX/PRO/DX INJ NEW DRUG ADDON: CPT

## 2021-07-07 PROCEDURE — 25010000002 DEXAMETHASONE SODIUM PHOSPHATE 100 MG/10ML SOLUTION: Performed by: NURSE PRACTITIONER

## 2021-07-07 PROCEDURE — 25010000002 MAGNESIUM SULFATE PER 500 MG OF MAGNESIUM: Performed by: NURSE PRACTITIONER

## 2021-07-07 PROCEDURE — 96366 THER/PROPH/DIAG IV INF ADDON: CPT

## 2021-07-07 PROCEDURE — 25010000002 CYANOCOBALAMIN PER 1000 MCG: Performed by: NURSE PRACTITIONER

## 2021-07-07 PROCEDURE — 25010000002 FOSAPREPITANT PER 1 MG: Performed by: NURSE PRACTITIONER

## 2021-07-07 PROCEDURE — 96415 CHEMO IV INFUSION ADDL HR: CPT

## 2021-07-07 PROCEDURE — 77386: CPT | Performed by: RADIOLOGY

## 2021-07-07 PROCEDURE — 83735 ASSAY OF MAGNESIUM: CPT

## 2021-07-07 PROCEDURE — 25010000002 HEPARIN LOCK FLUSH PER 10 UNITS: Performed by: INTERNAL MEDICINE

## 2021-07-07 PROCEDURE — 96367 TX/PROPH/DG ADDL SEQ IV INF: CPT

## 2021-07-07 PROCEDURE — 85025 COMPLETE CBC W/AUTO DIFF WBC: CPT

## 2021-07-07 PROCEDURE — 77014 CHG CT GUIDANCE RADIATION THERAPY FLDS PLACEMENT: CPT | Performed by: RADIOLOGY

## 2021-07-07 PROCEDURE — 25010000002 CISPLATIN PER 50 MG: Performed by: NURSE PRACTITIONER

## 2021-07-07 PROCEDURE — 25010000002 PALONOSETRON PER 25 MCG: Performed by: NURSE PRACTITIONER

## 2021-07-07 PROCEDURE — 80053 COMPREHEN METABOLIC PANEL: CPT

## 2021-07-07 PROCEDURE — 25010000002 PEMETREXED PER 10 MG: Performed by: NURSE PRACTITIONER

## 2021-07-07 PROCEDURE — 96411 CHEMO IV PUSH ADDL DRUG: CPT

## 2021-07-07 PROCEDURE — 25010000002 POTASSIUM CHLORIDE PER 2 MEQ OF POTASSIUM: Performed by: NURSE PRACTITIONER

## 2021-07-07 PROCEDURE — 96372 THER/PROPH/DIAG INJ SC/IM: CPT

## 2021-07-07 PROCEDURE — 77386 CHG INTENSITY MODULATED RADIATION TX DLVR COMPLEX: CPT | Performed by: RADIOLOGY

## 2021-07-07 PROCEDURE — 25010000002 ALTEPLASE 2 MG RECONSTITUTED SOLUTION: Performed by: INTERNAL MEDICINE

## 2021-07-07 RX ORDER — SODIUM CHLORIDE 9 MG/ML
250 INJECTION, SOLUTION INTRAVENOUS ONCE
Status: COMPLETED | OUTPATIENT
Start: 2021-07-07 | End: 2021-07-07

## 2021-07-07 RX ORDER — CYANOCOBALAMIN 1000 UG/ML
1000 INJECTION, SOLUTION INTRAMUSCULAR; SUBCUTANEOUS ONCE
Status: CANCELLED | OUTPATIENT
Start: 2021-07-07 | End: 2021-07-07

## 2021-07-07 RX ORDER — HEPARIN SODIUM (PORCINE) LOCK FLUSH IV SOLN 100 UNIT/ML 100 UNIT/ML
500 SOLUTION INTRAVENOUS AS NEEDED
Status: DISCONTINUED | OUTPATIENT
Start: 2021-07-07 | End: 2021-07-07 | Stop reason: HOSPADM

## 2021-07-07 RX ORDER — PALONOSETRON 0.05 MG/ML
0.25 INJECTION, SOLUTION INTRAVENOUS ONCE
Status: COMPLETED | OUTPATIENT
Start: 2021-07-07 | End: 2021-07-07

## 2021-07-07 RX ORDER — SUCRALFATE 1 G/1
1 TABLET ORAL 4 TIMES DAILY
Qty: 120 TABLET | Refills: 3 | Status: SHIPPED | OUTPATIENT
Start: 2021-07-07 | End: 2021-11-26

## 2021-07-07 RX ORDER — SODIUM CHLORIDE 0.9 % (FLUSH) 0.9 %
10 SYRINGE (ML) INJECTION AS NEEDED
Status: CANCELLED | OUTPATIENT
Start: 2021-07-07

## 2021-07-07 RX ORDER — CYANOCOBALAMIN 1000 UG/ML
1000 INJECTION, SOLUTION INTRAMUSCULAR; SUBCUTANEOUS ONCE
Status: COMPLETED | OUTPATIENT
Start: 2021-07-07 | End: 2021-07-07

## 2021-07-07 RX ORDER — PALONOSETRON 0.05 MG/ML
0.25 INJECTION, SOLUTION INTRAVENOUS ONCE
Status: CANCELLED | OUTPATIENT
Start: 2021-07-07

## 2021-07-07 RX ORDER — HEPARIN SODIUM (PORCINE) LOCK FLUSH IV SOLN 100 UNIT/ML 100 UNIT/ML
500 SOLUTION INTRAVENOUS AS NEEDED
Status: CANCELLED | OUTPATIENT
Start: 2021-07-07

## 2021-07-07 RX ORDER — SODIUM CHLORIDE 0.9 % (FLUSH) 0.9 %
10 SYRINGE (ML) INJECTION AS NEEDED
Status: DISCONTINUED | OUTPATIENT
Start: 2021-07-07 | End: 2021-07-07 | Stop reason: HOSPADM

## 2021-07-07 RX ADMIN — SODIUM CHLORIDE 910 MG: 9 INJECTION, SOLUTION INTRAVENOUS at 11:32

## 2021-07-07 RX ADMIN — PALONOSETRON HYDROCHLORIDE 0.25 MG: 0.25 INJECTION, SOLUTION INTRAVENOUS at 11:15

## 2021-07-07 RX ADMIN — CISPLATIN 136 MG: 1 INJECTION, SOLUTION INTRAVENOUS at 12:21

## 2021-07-07 RX ADMIN — CYANOCOBALAMIN 1000 MCG: 1000 INJECTION, SOLUTION INTRAMUSCULAR; SUBCUTANEOUS at 10:34

## 2021-07-07 RX ADMIN — ALTEPLASE: 2.2 INJECTION, POWDER, LYOPHILIZED, FOR SOLUTION INTRAVENOUS at 08:06

## 2021-07-07 RX ADMIN — Medication 500 UNITS: at 16:50

## 2021-07-07 RX ADMIN — DEXAMETHASONE SODIUM PHOSPHATE 12 MG: 10 INJECTION, SOLUTION INTRAMUSCULAR; INTRAVENOUS at 11:15

## 2021-07-07 RX ADMIN — SODIUM CHLORIDE, PRESERVATIVE FREE 10 ML: 5 INJECTION INTRAVENOUS at 16:50

## 2021-07-07 RX ADMIN — SODIUM CHLORIDE 100 ML: 9 INJECTION, SOLUTION INTRAVENOUS at 11:44

## 2021-07-07 RX ADMIN — SODIUM CHLORIDE 250 ML: 9 INJECTION, SOLUTION INTRAVENOUS at 09:10

## 2021-07-07 NOTE — PROGRESS NOTES
Physician Weekly Management Note    Diagnosis:    1. Primary adenocarcinoma of upper lobe of left lung (CMS/HCC)     cT1, cN2, cM0    Reason for Visit:  Tx: 30/33  Concurrent Chemo:   Yes    Notes on Treatment course, Films, Medical progress and Plan:  Doing well. Mild SOA continues but tolerating well. Eating ok, adding fleenors this week. Last chemo today. No problems or questions, cont on.    Performance Status: (1) Restricted in physically strenuous activity, ambulatory and able to do work of light nature  Subjective     ROS - Other than as listed above, as follows:  Constitutional - Normal - no complaints of fatigue, denies lack of appetite, fever, night sweats or change in weight.  Neck - Normal - denies neck masses, muscle weakness, neck pain, decreased range of motion or swelling.  Cardiovascular - Normal - denies arrhythmias, chest pain, dyspnea, edema, orthopnea or palpitations.  Respiratory - Normal - denies cough, dyspnea, hemoptysis, hiccoughs, pleuritic chest pain or wheezing.  Gastrointestinal - Normal - no complaints of constipation, abdominal pain, diarrhea, heartburn/dyspepsia, hematemesis, hemorrhoids, melena or GI bleeding, nausea, pain or cramping or vomiting.     There were no vitals filed for this visit.  Objective     PHYSICAL EXAM - Other than listed above, as follows:  Constitutional - Normal - no evidence of impaired alertness, inadequate appearance, premature or advanced chronologic age, uncooperativeness, altered mood, affect or disorientation.  Neck - Normal - no evidence of tender or enlarged lymph nodes, neck abnormalities, restricted range of motion or enlarged thyroid.  Chest - Normal - no evidence of chest abnormalities, tender or enlarged lymph nodes.  Respiratory - Normal - no evidence of abnormal breat sounds, chest abnormalities on palpation and chest abnormalities on percussion and normal breath sounds.  Hematologic/Lymphatic - Normal - no evidence of tender or enlarged  "axillary lymph nodes nor tender or enlarged neck nodes.    Technical aspects reviewed:  Weekly OBI approved if applicable?  Yes  Weekly port films approved?  Yes  Change requests noted if applicable?  Yes  Patient setup and plan reviewed?  Yes    Chart Reviewed:  Continue current treatment plan?  Yes  Treatment plan change requested?  No    I have reviewed and marked as \"reviewed\" the current medications, allergies and problem list in the patients EMR.  I have reviewed the patient's medical, surgical  history in detail, reviewed any pertinent lab work  and updated the computerized patient record if needed.    Patient's Care Team:  Patient Care Team:  Vandana Gastelum MD as PCP - General (Family Medicine)  Vandana Gastelum MD as Referring Physician (Family Medicine)  Shell Thomson MD as Consulting Physician (Radiation Oncology)  "

## 2021-07-07 NOTE — NURSING NOTE
Pt completed cisplat therapy today.  Pt with 6 lb weight gain.  Denies SOA.  No cough.  Lungs clear bilaterally.  Voided 900 cc today.  No distress.  Pt instructed to call for any SOA, chest pain, etc.  Pt v/u.  Discharged ambulating with spouse.

## 2021-07-08 PROCEDURE — 77014 CHG CT GUIDANCE RADIATION THERAPY FLDS PLACEMENT: CPT | Performed by: RADIOLOGY

## 2021-07-08 PROCEDURE — 77386 CHG INTENSITY MODULATED RADIATION TX DLVR COMPLEX: CPT | Performed by: RADIOLOGY

## 2021-07-08 PROCEDURE — 77386: CPT | Performed by: RADIOLOGY

## 2021-07-08 PROCEDURE — 77427 RADIATION TX MANAGEMENT X5: CPT | Performed by: RADIOLOGY

## 2021-07-09 PROCEDURE — 77386 CHG INTENSITY MODULATED RADIATION TX DLVR COMPLEX: CPT | Performed by: RADIOLOGY

## 2021-07-09 PROCEDURE — 77386: CPT | Performed by: RADIOLOGY

## 2021-07-09 PROCEDURE — 77014 CHG CT GUIDANCE RADIATION THERAPY FLDS PLACEMENT: CPT | Performed by: RADIOLOGY

## 2021-07-12 ENCOUNTER — TELEPHONE (OUTPATIENT)
Dept: ONCOLOGY | Facility: CLINIC | Age: 56
End: 2021-07-12

## 2021-07-12 ENCOUNTER — DOCUMENTATION (OUTPATIENT)
Dept: RADIATION ONCOLOGY | Facility: HOSPITAL | Age: 56
End: 2021-07-12

## 2021-07-12 DIAGNOSIS — C34.12 PRIMARY ADENOCARCINOMA OF UPPER LOBE OF LEFT LUNG (HCC): Primary | ICD-10-CM

## 2021-07-12 PROCEDURE — 77386 CHG INTENSITY MODULATED RADIATION TX DLVR COMPLEX: CPT | Performed by: RADIOLOGY

## 2021-07-12 PROCEDURE — 77014 CHG CT GUIDANCE RADIATION THERAPY FLDS PLACEMENT: CPT | Performed by: RADIOLOGY

## 2021-07-12 PROCEDURE — 77386: CPT | Performed by: RADIOLOGY

## 2021-07-12 RX ORDER — PROCHLORPERAZINE MALEATE 10 MG
10 TABLET ORAL EVERY 8 HOURS PRN
Qty: 30 TABLET | Refills: 0 | Status: SHIPPED | OUTPATIENT
Start: 2021-07-12 | End: 2021-07-27 | Stop reason: SDUPTHER

## 2021-07-12 NOTE — TELEPHONE ENCOUNTER
Pt called and said she has been dry heaving all weekend despite taking Zofran. She said she is taking in small amounts orally and is not actually vomiting but she continues to have persistent nausea. Reviewed with FELI Overton. Pt e-scribed Compazine and will need appt to come in tomorrow for labs, an NP visit and possible fluids. Pt did not want to make an appt for tomorrow but per Brooklynn, its better to have the appt and cancel it than possibly putting her off longer to be seen. Pt made aware and v/u. Message sent to scheduling.

## 2021-07-13 ENCOUNTER — INFUSION (OUTPATIENT)
Dept: ONCOLOGY | Facility: HOSPITAL | Age: 56
End: 2021-07-13

## 2021-07-13 ENCOUNTER — OFFICE VISIT (OUTPATIENT)
Dept: ONCOLOGY | Facility: CLINIC | Age: 56
End: 2021-07-13

## 2021-07-13 ENCOUNTER — TELEPHONE (OUTPATIENT)
Dept: ONCOLOGY | Facility: CLINIC | Age: 56
End: 2021-07-13

## 2021-07-13 VITALS
OXYGEN SATURATION: 96 % | HEART RATE: 107 BPM | HEIGHT: 63 IN | TEMPERATURE: 97.1 F | RESPIRATION RATE: 16 BRPM | BODY MASS INDEX: 30.48 KG/M2 | DIASTOLIC BLOOD PRESSURE: 85 MMHG | WEIGHT: 172 LBS | SYSTOLIC BLOOD PRESSURE: 116 MMHG

## 2021-07-13 DIAGNOSIS — R11.0 CHEMOTHERAPY-INDUCED NAUSEA: ICD-10-CM

## 2021-07-13 DIAGNOSIS — C34.12 PRIMARY ADENOCARCINOMA OF UPPER LOBE OF LEFT LUNG (HCC): ICD-10-CM

## 2021-07-13 DIAGNOSIS — Z45.2 ENCOUNTER FOR FITTING AND ADJUSTMENT OF VASCULAR CATHETER: Primary | ICD-10-CM

## 2021-07-13 DIAGNOSIS — C50.212 MALIGNANT NEOPLASM OF UPPER-INNER QUADRANT OF LEFT BREAST IN FEMALE, ESTROGEN RECEPTOR POSITIVE (HCC): ICD-10-CM

## 2021-07-13 DIAGNOSIS — C34.12 PRIMARY ADENOCARCINOMA OF UPPER LOBE OF LEFT LUNG (HCC): Primary | ICD-10-CM

## 2021-07-13 DIAGNOSIS — T82.898A OTHER COMPLICATION OF VASCULAR DEVICE, INITIAL ENCOUNTER (HCC): ICD-10-CM

## 2021-07-13 DIAGNOSIS — Z17.0 MALIGNANT NEOPLASM OF UPPER-INNER QUADRANT OF LEFT BREAST IN FEMALE, ESTROGEN RECEPTOR POSITIVE (HCC): ICD-10-CM

## 2021-07-13 DIAGNOSIS — Z79.899 HIGH RISK MEDICATION USE: ICD-10-CM

## 2021-07-13 DIAGNOSIS — T45.1X5A CHEMOTHERAPY-INDUCED NAUSEA: ICD-10-CM

## 2021-07-13 LAB
ALBUMIN SERPL-MCNC: 3.8 G/DL (ref 3.5–5.2)
ALBUMIN/GLOB SERPL: 1.2 G/DL (ref 1.1–2.4)
ALP SERPL-CCNC: 98 U/L (ref 38–116)
ALT SERPL W P-5'-P-CCNC: 13 U/L (ref 0–33)
ANION GAP SERPL CALCULATED.3IONS-SCNC: 13.8 MMOL/L (ref 5–15)
AST SERPL-CCNC: 13 U/L (ref 0–32)
BASOPHILS # BLD AUTO: 0.02 10*3/MM3 (ref 0–0.2)
BASOPHILS NFR BLD AUTO: 0.6 % (ref 0–1.5)
BILIRUB SERPL-MCNC: 0.3 MG/DL (ref 0.2–1.2)
BUN SERPL-MCNC: 21 MG/DL (ref 6–20)
BUN/CREAT SERPL: 16.4 (ref 7.3–30)
CALCIUM SPEC-SCNC: 9 MG/DL (ref 8.5–10.2)
CHLORIDE SERPL-SCNC: 99 MMOL/L (ref 98–107)
CO2 SERPL-SCNC: 25.2 MMOL/L (ref 22–29)
CREAT SERPL-MCNC: 1.28 MG/DL (ref 0.6–1.1)
DEPRECATED RDW RBC AUTO: 48.7 FL (ref 37–54)
EOSINOPHIL # BLD AUTO: 0.05 10*3/MM3 (ref 0–0.4)
EOSINOPHIL NFR BLD AUTO: 1.6 % (ref 0.3–6.2)
ERYTHROCYTE [DISTWIDTH] IN BLOOD BY AUTOMATED COUNT: 15.9 % (ref 12.3–15.4)
GFR SERPL CREATININE-BSD FRML MDRD: 43 ML/MIN/1.73
GLOBULIN UR ELPH-MCNC: 3.1 GM/DL (ref 1.8–3.5)
GLUCOSE SERPL-MCNC: 137 MG/DL (ref 74–124)
HCT VFR BLD AUTO: 38.5 % (ref 34–46.6)
HGB BLD-MCNC: 12.1 G/DL (ref 12–15.9)
IMM GRANULOCYTES # BLD AUTO: 0.04 10*3/MM3 (ref 0–0.05)
IMM GRANULOCYTES NFR BLD AUTO: 1.3 % (ref 0–0.5)
LYMPHOCYTES # BLD AUTO: 0.47 10*3/MM3 (ref 0.7–3.1)
LYMPHOCYTES NFR BLD AUTO: 15.1 % (ref 19.6–45.3)
MCH RBC QN AUTO: 26.8 PG (ref 26.6–33)
MCHC RBC AUTO-ENTMCNC: 31.4 G/DL (ref 31.5–35.7)
MCV RBC AUTO: 85.4 FL (ref 79–97)
MONOCYTES # BLD AUTO: 0.58 10*3/MM3 (ref 0.1–0.9)
MONOCYTES NFR BLD AUTO: 18.6 % (ref 5–12)
NEUTROPHILS NFR BLD AUTO: 1.96 10*3/MM3 (ref 1.7–7)
NEUTROPHILS NFR BLD AUTO: 62.8 % (ref 42.7–76)
NRBC BLD AUTO-RTO: 0 /100 WBC (ref 0–0.2)
PLATELET # BLD AUTO: 119 10*3/MM3 (ref 140–450)
PMV BLD AUTO: 9.4 FL (ref 6–12)
POTASSIUM SERPL-SCNC: 3.5 MMOL/L (ref 3.5–4.7)
PROT SERPL-MCNC: 6.9 G/DL (ref 6.3–8)
RBC # BLD AUTO: 4.51 10*6/MM3 (ref 3.77–5.28)
SODIUM SERPL-SCNC: 138 MMOL/L (ref 134–145)
WBC # BLD AUTO: 3.12 10*3/MM3 (ref 3.4–10.8)

## 2021-07-13 PROCEDURE — 96360 HYDRATION IV INFUSION INIT: CPT

## 2021-07-13 PROCEDURE — 25010000002 ALTEPLASE 2 MG RECONSTITUTED SOLUTION

## 2021-07-13 PROCEDURE — 25010000002 ALTEPLASE 2 MG RECONSTITUTED SOLUTION: Performed by: INTERNAL MEDICINE

## 2021-07-13 PROCEDURE — 25010000002 HEPARIN LOCK FLUSH PER 10 UNITS: Performed by: INTERNAL MEDICINE

## 2021-07-13 PROCEDURE — 77336 RADIATION PHYSICS CONSULT: CPT | Performed by: RADIOLOGY

## 2021-07-13 PROCEDURE — 99214 OFFICE O/P EST MOD 30 MIN: CPT | Performed by: NURSE PRACTITIONER

## 2021-07-13 PROCEDURE — 80053 COMPREHEN METABOLIC PANEL: CPT

## 2021-07-13 PROCEDURE — 36593 DECLOT VASCULAR DEVICE: CPT

## 2021-07-13 PROCEDURE — 85025 COMPLETE CBC W/AUTO DIFF WBC: CPT

## 2021-07-13 RX ORDER — HEPARIN SODIUM (PORCINE) LOCK FLUSH IV SOLN 100 UNIT/ML 100 UNIT/ML
500 SOLUTION INTRAVENOUS AS NEEDED
Status: DISCONTINUED | OUTPATIENT
Start: 2021-07-13 | End: 2021-07-13 | Stop reason: HOSPADM

## 2021-07-13 RX ORDER — HEPARIN SODIUM (PORCINE) LOCK FLUSH IV SOLN 100 UNIT/ML 100 UNIT/ML
500 SOLUTION INTRAVENOUS AS NEEDED
Status: CANCELLED | OUTPATIENT
Start: 2021-07-13

## 2021-07-13 RX ORDER — SODIUM CHLORIDE 0.9 % (FLUSH) 0.9 %
10 SYRINGE (ML) INJECTION AS NEEDED
Status: CANCELLED | OUTPATIENT
Start: 2021-07-13

## 2021-07-13 RX ORDER — SODIUM CHLORIDE 9 MG/ML
500 INJECTION, SOLUTION INTRAVENOUS ONCE
Status: COMPLETED | OUTPATIENT
Start: 2021-07-13 | End: 2021-07-13

## 2021-07-13 RX ADMIN — SODIUM CHLORIDE 500 ML: 9 INJECTION, SOLUTION INTRAVENOUS at 12:51

## 2021-07-13 RX ADMIN — ALTEPLASE: 2.2 INJECTION, POWDER, LYOPHILIZED, FOR SOLUTION INTRAVENOUS at 11:13

## 2021-07-13 RX ADMIN — Medication 500 UNITS: at 13:28

## 2021-07-13 RX ADMIN — SODIUM CHLORIDE 500 ML: 9 INJECTION, SOLUTION INTRAVENOUS at 12:17

## 2021-07-13 RX ADMIN — ALTEPLASE: 2.2 INJECTION, POWDER, LYOPHILIZED, FOR SOLUTION INTRAVENOUS at 12:49

## 2021-07-13 NOTE — TELEPHONE ENCOUNTER
Caller: BLANCA    Relationship:  ORDER DEPT    Best call back number: [phone]    What is the best time to reach you: ANYTIME    What was the call regarding: PATIENT HAS A PORT STUDY SCHEDULED FOR TOMORROW. BLAN[facility] STATES THE ORDER IS ONLY SIGNED BY A NURSE, WHICH THEY CANNOT ACCEPT. WILL NEED A DR SIGNATURE. SHE NEEDS THIS IN BY 3:30PM TODAY, OTHERWISE IT MAY HAVE TO BE RESCHEDULED.    Do you require a callback: NO

## 2021-07-13 NOTE — PROGRESS NOTES
Subjective   Holli Patel is a 56 y.o. female.  Referred by Dr. 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    3/1/2021-bilateral screening mammogram-microcalcifications seen in the posterior one third of the lateral aspect of the right breast.  Area of focal asymmetry seen in the middle one third of the upper inner quadrant of the left breast.    3/1/2021-DEXA scan-T score of -2.2 on the lumbar spine and T score of -2.3 in the left hip and T score of -1.9 in the right hip.  Findings consistent with osteopenia    3/23/2021-screening lung CT-there is a 10 x 11 mm solid nodule in the left upper lobe.  Enlarged AP window lymph node measuring 14 x 10 mm.  Suggestion of a possible 9 mm left hilar lymph node.  Heavy coronary artery calcification.    3/23/2021-diagnostic mammogram bilateral-cluster of microcalcifications in the middle third lateral aspect of the right breast.  Left breast demonstrates persistence of the area of focal asymmetry in the region.    Ultrasound-left breast ultrasound at 10 o'clock position, 6 cm from the nipple there is a 0.4 cm irregular hypoechoic lesion.  Stereotactic biopsy of the right breast calcifications recommended.  Ultrasound-guided biopsy of the left breast lesion recommended    4/7/2021-right breast stereotactic biopsy and left breast ultrasound-guided biopsy  Pathology  Right breast-invasive ductal carcinoma, grade 2, lymphovascular invasion present, ER +99% strong, ME +80% moderate, HER-2 negative, Ki-67 12%  Left breast upper inner quadrant 10 o'clock position biopsy, invasive ductal carcinoma, grade   2, ER +99% strong, ME +40% strong, HER-2 2+ on immunohistochemistry, nonamplified on FISH Ki-67 10%    4/14/2021-PET/CT-FDG avid aortopulmonary window lymph node measures 0.9 cm with an SUV of 7.5.  FDG avid left hilar lymph node  measures 1 cm with an SUV of 17.2.  Irregular soft tissue density in the right breast measuring 3.7 x 3.8 cm is favored to be secondary to the recent breast biopsy.  1.1 cm pulmonary nodule within the left upper lobe with SUV 9.7 .  Sub-6 mm pulmonary nodules are present in the right lower lobe.  No evidence of lymphadenopathy or metastatic disease in the abdomen.  Focal area of FDG uptake within the central canal posterior to T11-T12 displaced demonstrating an SUV of 5.5 thought to be reactive however MRI is recommended for further evaluation.    She was seen by Dr. Molina who initially planned for breast surgery however  due to the PET abnormalities she has been referred to Dr. Kerr who plans to do a wound on 4/30/2021.  Dr. Molina's and Dr. Kerr's notes reviewed.    Patient denies any family history of breast cancer.  Her mother had lymphoma.  Maternal grandmother had colon cancer.  Prior to that screening mammogram she did not have any palpable abnormalities of either breast.    She has been a heavy smoker for about 40 years and smoked 2 packs/day.  Denies any recent weight changes, new bone pains, cough, abdominal pain nausea vomiting constipation or diarrhea.  She does have anxiety and has been on several medications.  She has recently been started on Xanax to help with the mood and also with insomnia.    Patient had a video-assisted thoracoscopy and mediastinal lymphadenectomy on 4/30/2021.  L5-L6 lymph nodes were biopsied however the small pulmonary nodule in the left upper lobe was not difficult to find.  Pathology showed moderately differentiated adenocarcinoma which is CK7 and TTF-1 positive.  Consistent with lung primary.  Ki-67 85%.    5/14/2021-MRI of the brain-minimal chronic small vessel ischemic change in the white matter.  Otherwise negative MRI.    5/20/2021-bilateral axillary ultrasound-no evidence of axillary lymphadenopathy in either axilla.  Normal-appearing bilateral axillary lymph  nodes visualized.    Cycle 1 cisplatin and Alimta on 5/25/2021.    Cycle 2 cisplatin Alimta 6/15/2021    Cycle 3 cisplatin Alimta 7/7/2021    Interval history  Patient called M7 7/12/2021 reporting dry heaving throughout the weekend despite taking Zofran.  The point time of calling on Monday, she it was no longer having vomiting but persistent nausea.  She was sent in Compazine at that time and asked to come in today for labs and follow-up to make sure things are improving.  Reports overnight her nausea has been better controlled with use of Compazine.  She is increasing what she is eating and drinking though she was nauseous this morning but should quickly resolved with Compazine.  She denies trouble with her bowels, fevers, or abdominal pain prior to the nausea symptoms.  She is sore from the dry heaving.  She is frustrated the report once it is not returning blood.    The following portions of the patient's history were reviewed and updated as appropriate: allergies, current medications, past family history, past medical history, past social history, past surgical history and problem list.    Past Medical History:   Diagnosis Date   • Anxiety    • Dyspnea on exertion    • SHAYY (generalized anxiety disorder)     RELATED HIGH BLOOD PRESSURE   • GERD (gastroesophageal reflux disease)    • High blood pressure     ANXIETY RELATED   • Hyperlipidemia    • Hypothyroidism    • Insomnia    • Lung cancer (CMS/HCC)    • Lung nodule    • Malignant neoplasm of upper-outer quadrant of right breast in female, estrogen receptor positive (CMS/HCC) 4/13/2021   • Migraine    • PONV (postoperative nausea and vomiting)         Past Surgical History:   Procedure Laterality Date   • BREAST BIOPSY Bilateral 04/07/2021    Right Breast 10 o'clock & Left breast 10 o'clock 6 cm from nipple, [facility]   • CLOSED REDUCTION HIP DISLOCATION Left 4/30/2021    Procedure: BRONCHOSCOPY, LEFT VIDEO ASSITED THORACOSCOPY, ROBOTIC ASSSITED MEDIASTINAL  LYMPHADENECTOMY WITH INTERCOSTAL NERVE BLOCKS;  Surgeon: Raphael Kerr III, MD;  Location: [facility] MAIN OR;  Service: DaVinci;  Laterality: Left;   • COLONOSCOPY     • VENOUS ACCESS DEVICE (PORT) INSERTION Right 2021    Procedure: INSERTION VENOUS ACCESS DEVICE;  Surgeon: Raphael Kerr III, MD;  Location: [facility] MAIN OR;  Service: Thoracic;  Laterality: Right;   • WRIST SURGERY Right         Family History   Problem Relation Age of Onset   • Cancer Father         LYMPHATIC   • Alcohol abuse Brother    • Colon cancer Maternal Grandmother    • Malig Hyperthermia Neg Hx         Social History     Socioeconomic History   • Marital status:      Spouse name: Not on file   • Number of children: Not on file   • Years of education: Not on file   • Highest education level: Not on file   Tobacco Use   • Smoking status: Current Every Day Smoker     Packs/day: 1.00     Types: Electronic Cigarette, Cigarettes     Start date: 1981   • Smokeless tobacco: Never Used   • Tobacco comment: ABT 15 CIGARETTES DAILY   Vaping Use   • Vaping Use: Never used   Substance and Sexual Activity   • Alcohol use: Never   • Drug use: Never   • Sexual activity: Yes        OB History        2    Para   2    Term   2            AB        Living           SAB        TAB        Ectopic        Molar        Multiple        Live Births                Age of menarche-10  Age at menopause-42  Oral contraceptive pill use-15 years  No HRT use  She has 2 children  Age at first live childbirth-17    No Known Allergies     Review of Systems   Review of systems as mentioned in the HPI    Objective   not currently breastfeeding.     Vitals:  Temperature 98.7, blood pressure 130/89, heart rate 84, respirations 16, oxygen saturation 96%, weight 80.8 kg.    Physical Exam  Vitals reviewed.   Constitutional:       Appearance: She is obese.   HENT:      Head: Normocephalic.      Nose: Nose normal.      Mouth/Throat:      Mouth: Mucous  membranes are moist.      Pharynx: Oropharynx is clear.   Eyes:      Extraocular Movements: Extraocular movements intact.      Conjunctiva/sclera: Conjunctivae normal.      Pupils: Pupils are equal, round, and reactive to light.   Cardiovascular:      Rate and Rhythm: Normal rate and regular rhythm.      Pulses: Normal pulses.      Heart sounds: Normal heart sounds.   Pulmonary:      Effort: Pulmonary effort is normal.      Breath sounds: Normal breath sounds.   Abdominal:      General: Abdomen is flat. Bowel sounds are normal. There is no distension.      Palpations: There is no mass.   Musculoskeletal:         General: Normal range of motion.      Cervical back: Normal range of motion.   Skin:     General: Skin is warm.   Neurological:      General: No focal deficit present.      Mental Status: She is alert and oriented to person, place, and time.   Psychiatric:         Mood and Affect: Mood normal.         Behavior: Behavior normal.         Thought Content: Thought content normal.         Judgment: Judgment normal.     Breast Exam: Right breast with a large postbiopsy hematoma in the upper outer quadrant measuring 4 cm.  Visible ecchymosis.  No other palpable abnormalities.  Left breast with no palpable abnormalities.  No right or left axillary lymphadenopathy    I have reexamined the patient and the results are consistent with the previously documented exam. Sherron Saldaña, APRN     Infusion on 07/07/2021   Component Date Value Ref Range Status   • Glucose 07/07/2021 114  74 - 124 mg/dL Final   • BUN 07/07/2021 13  6 - 20 mg/dL Final   • Creatinine 07/07/2021 0.87  0.60 - 1.10 mg/dL Final   • Sodium 07/07/2021 142  134 - 145 mmol/L Final   • Potassium 07/07/2021 4.7  3.5 - 4.7 mmol/L Final   • Chloride 07/07/2021 104  98 - 107 mmol/L Final   • CO2 07/07/2021 27.8  22.0 - 29.0 mmol/L Final   • Calcium 07/07/2021 9.8  8.5 - 10.2 mg/dL Final   • Total Protein 07/07/2021 6.9  6.3 - 8.0 g/dL Final   • Albumin  07/07/2021 4.10  3.50 - 5.20 g/dL Final   • ALT (SGPT) 07/07/2021 12  0 - 33 U/L Final   • AST (SGOT) 07/07/2021 12  0 - 32 U/L Final   • Alkaline Phosphatase 07/07/2021 124* 38 - 116 U/L Final   • Total Bilirubin 07/07/2021 <0.2* 0.2 - 1.2 mg/dL Final   • eGFR Non  Amer 07/07/2021 67  >60 mL/min/1.73 Final   • Globulin 07/07/2021 2.8  1.8 - 3.5 gm/dL Final   • A/G Ratio 07/07/2021 1.5  1.1 - 2.4 g/dL Final   • BUN/Creatinine Ratio 07/07/2021 14.9  7.3 - 30.0 Final   • Anion Gap 07/07/2021 10.2  5.0 - 15.0 mmol/L Final   • Magnesium 07/07/2021 1.7* 1.8 - 2.5 mg/dL Final   • WBC 07/07/2021 8.20  3.40 - 10.80 10*3/mm3 Final   • RBC 07/07/2021 4.09  3.77 - 5.28 10*6/mm3 Final   • Hemoglobin 07/07/2021 11.0* 12.0 - 15.9 g/dL Final   • Hematocrit 07/07/2021 35.3  34.0 - 46.6 % Final   • MCV 07/07/2021 86.3  79.0 - 97.0 fL Final   • MCH 07/07/2021 26.9  26.6 - 33.0 pg Final   • MCHC 07/07/2021 31.2* 31.5 - 35.7 g/dL Final   • RDW 07/07/2021 16.2* 12.3 - 15.4 % Final   • RDW-SD 07/07/2021 49.5  37.0 - 54.0 fl Final   • MPV 07/07/2021 8.8  6.0 - 12.0 fL Final   • Platelets 07/07/2021 360  140 - 450 10*3/mm3 Final   • Neutrophil % 07/07/2021 67.3  42.7 - 76.0 % Final   • Lymphocyte % 07/07/2021 15.9* 19.6 - 45.3 % Final   • Monocyte % 07/07/2021 12.4* 5.0 - 12.0 % Final   • Eosinophil % 07/07/2021 0.6  0.3 - 6.2 % Final   • Basophil % 07/07/2021 0.5  0.0 - 1.5 % Final   • Immature Grans % 07/07/2021 3.3* 0.0 - 0.5 % Final   • Neutrophils, Absolute 07/07/2021 5.52  1.70 - 7.00 10*3/mm3 Final   • Lymphocytes, Absolute 07/07/2021 1.30  0.70 - 3.10 10*3/mm3 Final   • Monocytes, Absolute 07/07/2021 1.02* 0.10 - 0.90 10*3/mm3 Final   • Eosinophils, Absolute 07/07/2021 0.05  0.00 - 0.40 10*3/mm3 Final   • Basophils, Absolute 07/07/2021 0.04  0.00 - 0.20 10*3/mm3 Final   • Immature Grans, Absolute 07/07/2021 0.27* 0.00 - 0.05 10*3/mm3 Final   • nRBC 07/07/2021 0.2  0.0 - 0.2 /100 WBC Final   Infusion on 06/22/2021   Component  Date Value Ref Range Status   • Glucose 06/22/2021 109  74 - 124 mg/dL Final   • BUN 06/22/2021 16  6 - 20 mg/dL Final   • Creatinine 06/22/2021 0.93  0.60 - 1.10 mg/dL Final   • Sodium 06/22/2021 139  134 - 145 mmol/L Final   • Potassium 06/22/2021 4.3  3.5 - 4.7 mmol/L Final   • Chloride 06/22/2021 101  98 - 107 mmol/L Final   • CO2 06/22/2021 25.6  22.0 - 29.0 mmol/L Final   • Calcium 06/22/2021 9.2  8.5 - 10.2 mg/dL Final   • Total Protein 06/22/2021 6.8  6.3 - 8.0 g/dL Final   • Albumin 06/22/2021 4.00  3.50 - 5.20 g/dL Final   • ALT (SGPT) 06/22/2021 21  0 - 33 U/L Final   • AST (SGOT) 06/22/2021 11  0 - 32 U/L Final   • Alkaline Phosphatase 06/22/2021 102  38 - 116 U/L Final   • Total Bilirubin 06/22/2021 0.3  0.2 - 1.2 mg/dL Final   • eGFR Non  Amer 06/22/2021 62  >60 mL/min/1.73 Final   • Globulin 06/22/2021 2.8  1.8 - 3.5 gm/dL Final   • A/G Ratio 06/22/2021 1.4  1.1 - 2.4 g/dL Final   • BUN/Creatinine Ratio 06/22/2021 17.2  7.3 - 30.0 Final   • Anion Gap 06/22/2021 12.4  5.0 - 15.0 mmol/L Final   • WBC 06/22/2021 4.70  3.40 - 10.80 10*3/mm3 Final   • RBC 06/22/2021 4.87  3.77 - 5.28 10*6/mm3 Final   • Hemoglobin 06/22/2021 12.9  12.0 - 15.9 g/dL Final   • Hematocrit 06/22/2021 41.5  34.0 - 46.6 % Final   • MCV 06/22/2021 85.2  79.0 - 97.0 fL Final   • MCH 06/22/2021 26.5* 26.6 - 33.0 pg Final   • MCHC 06/22/2021 31.1* 31.5 - 35.7 g/dL Final   • RDW 06/22/2021 14.2  12.3 - 15.4 % Final   • RDW-SD 06/22/2021 43.1  37.0 - 54.0 fl Final   • MPV 06/22/2021 9.3  6.0 - 12.0 fL Final   • Platelets 06/22/2021 187  140 - 450 10*3/mm3 Final   • Neutrophil % 06/22/2021 64.8  42.7 - 76.0 % Final   • Lymphocyte % 06/22/2021 24.5  19.6 - 45.3 % Final   • Monocyte % 06/22/2021 6.6  5.0 - 12.0 % Final   • Eosinophil % 06/22/2021 2.6  0.3 - 6.2 % Final   • Basophil % 06/22/2021 0.6  0.0 - 1.5 % Final   • Immature Grans % 06/22/2021 0.9* 0.0 - 0.5 % Final   • Neutrophils, Absolute 06/22/2021 3.05  1.70 - 7.00  10*3/mm3 Final   • Lymphocytes, Absolute 06/22/2021 1.15  0.70 - 3.10 10*3/mm3 Final   • Monocytes, Absolute 06/22/2021 0.31  0.10 - 0.90 10*3/mm3 Final   • Eosinophils, Absolute 06/22/2021 0.12  0.00 - 0.40 10*3/mm3 Final   • Basophils, Absolute 06/22/2021 0.03  0.00 - 0.20 10*3/mm3 Final   • Immature Grans, Absolute 06/22/2021 0.04  0.00 - 0.05 10*3/mm3 Final   • nRBC 06/22/2021 0.0  0.0 - 0.2 /100 WBC Final   Infusion on 06/15/2021   Component Date Value Ref Range Status   • Glucose 06/15/2021 109  74 - 124 mg/dL Final   • BUN 06/15/2021 12  6 - 20 mg/dL Final   • Creatinine 06/15/2021 1.00  0.60 - 1.10 mg/dL Final   • Sodium 06/15/2021 141  134 - 145 mmol/L Final   • Potassium 06/15/2021 4.5  3.5 - 4.7 mmol/L Final   • Chloride 06/15/2021 103  98 - 107 mmol/L Final   • CO2 06/15/2021 25.9  22.0 - 29.0 mmol/L Final   • Calcium 06/15/2021 9.2  8.5 - 10.2 mg/dL Final   • Total Protein 06/15/2021 7.1  6.3 - 8.0 g/dL Final   • Albumin 06/15/2021 3.90  3.50 - 5.20 g/dL Final   • ALT (SGPT) 06/15/2021 10  0 - 33 U/L Final   • AST (SGOT) 06/15/2021 13  0 - 32 U/L Final   • Alkaline Phosphatase 06/15/2021 121* 38 - 116 U/L Final   • Total Bilirubin 06/15/2021 <0.2* 0.2 - 1.2 mg/dL Final   • eGFR Non African Amer 06/15/2021 57* >60 mL/min/1.73 Final   • Globulin 06/15/2021 3.2  1.8 - 3.5 gm/dL Final   • A/G Ratio 06/15/2021 1.2  1.1 - 2.4 g/dL Final   • BUN/Creatinine Ratio 06/15/2021 12.0  7.3 - 30.0 Final   • Anion Gap 06/15/2021 12.1  5.0 - 15.0 mmol/L Final   • Magnesium 06/15/2021 1.7* 1.8 - 2.5 mg/dL Final   • WBC 06/15/2021 6.94  3.40 - 10.80 10*3/mm3 Final   • RBC 06/15/2021 4.35  3.77 - 5.28 10*6/mm3 Final   • Hemoglobin 06/15/2021 11.7* 12.0 - 15.9 g/dL Final   • Hematocrit 06/15/2021 38.0  34.0 - 46.6 % Final   • MCV 06/15/2021 87.4  79.0 - 97.0 fL Final   • MCH 06/15/2021 26.9  26.6 - 33.0 pg Final   • MCHC 06/15/2021 30.8* 31.5 - 35.7 g/dL Final   • RDW 06/15/2021 14.6  12.3 - 15.4 % Final   • RDW-SD  06/15/2021 46.3  37.0 - 54.0 fl Final   • MPV 06/15/2021 8.9  6.0 - 12.0 fL Final   • Platelets 06/15/2021 459* 140 - 450 10*3/mm3 Final   • Neutrophil % 06/15/2021 49.7  42.7 - 76.0 % Final   • Lymphocyte % 06/15/2021 23.9  19.6 - 45.3 % Final   • Monocyte % 06/15/2021 17.6* 5.0 - 12.0 % Final   • Eosinophil % 06/15/2021 3.9  0.3 - 6.2 % Final   • Basophil % 06/15/2021 1.7* 0.0 - 1.5 % Final   • Immature Grans % 06/15/2021 3.2* 0.0 - 0.5 % Final   • Neutrophils, Absolute 06/15/2021 3.45  1.70 - 7.00 10*3/mm3 Final   • Lymphocytes, Absolute 06/15/2021 1.66  0.70 - 3.10 10*3/mm3 Final   • Monocytes, Absolute 06/15/2021 1.22* 0.10 - 0.90 10*3/mm3 Final   • Eosinophils, Absolute 06/15/2021 0.27  0.00 - 0.40 10*3/mm3 Final   • Basophils, Absolute 06/15/2021 0.12  0.00 - 0.20 10*3/mm3 Final   • Immature Grans, Absolute 06/15/2021 0.22* 0.00 - 0.05 10*3/mm3 Final   • nRBC 06/15/2021 0.0  0.0 - 0.2 /100 WBC Final        No radiology results for the last 30 days.   7/7/2021  CBC-WBC 8.20, hemoglobin 11.0, platelets 360,000.  CMP unremarkable with a creatinine of 0. 8 7.    Assessment/Plan      *Bilateral breast cancer  · Invasive ductal carcinoma in both right and left breast and lesions measure under 1 cm.  No palpable lymphadenopathy although unsure if this has been evaluated by an axillary ultrasound.  No abnormal axillary lymphadenopathy noted on PET.  · Most likely clinical T1b N0 M0, both cancers were noted to be grade 2, ER/NY positive and HER-2 negative and Ki-67 less than 15%  VATS on 4/30/2021  Biopsy confirms adenocarcinoma of pulmonary primary  Discussed with Dr. Molina about delaying breast surgery and she is in agreement  We discussed starting neoadjuvant endocrine therapy with anastrozole 1 mg p.o. daily  · Patient experienced significant adverse effects with anastrozole  · Treatment changed to letrozole   · Axillary ultrasound 4/20/2021 - for any axillary lymphadenopathy  · Started letrozole in May  2021  · She is tolerating letrozole fairly well.  · Reports being compliant with the same  · The right breast hematoma is relatively unchanged at 4 cm.  No other palpable abnormalities of either breast.  · 7/7/2021, continues on letrozole 2.5 mg daily, and continues to tolerate this reasonably well.    *Adenocarcinoma of the left lung  · Most likely stage T1 N2 M0, stage III  · MRI of the thoracic spine ordered to further evaluate the abnormality seen on PET  · MRI of the brain negative   · Given that she is only 55 and fairly good performance status I discussed with her by with concurrent chemoradiation with cisplatin and Alimta every 3 weeks followed by durvalumab.  · Adverse effects including but not limited to myelosuppression, increased risk of infections, nausea, vomiting, renal dysfunction, ototoxicity, neurotoxicity have been discussed at length.  · She has been recommended to start folic acid and she will be administered B12 injection today.  · MRI of the thoracic spine performed 5/24/2021 shows no evidence of metastatic disease in the thoracic spine however in L1 there is a 7 mm lesion which is indeterminate.  A lumbar spine MRI has been recommended.  · Dr. Snell personally discussed her case with Dr. Oates and he feels like MRI of the lumbar spine may also show that this lesion is indeterminate.  · Discussed the same with the patient and her .  · Even if this is metastatic disease this might be the only lesion of metastatic disease and then would consider this oligometastatic disease.  · Therefore plan to proceed with concurrent chemoradiation as scheduled.  · Labs reviewed and she is okay to proceed with cisplatin and Alimta, C1D1 5/25/2021  · Day 1 of radiation 5/25/2021  · 5/27/2021-MRI of the lumbar spine-there is a 6 x 5 x 6 mm enhancing lesion in the left posterior body of L1 vertebra.  This is considered indeterminate.  Follow-up recommended.  Degenerative changes noted at  L5-S1  · 7/7/2021, patient continues to tolerate treatment reasonably well.  Proceed with cycle 3 day 1 cisplatin Alimta today.    *Lumbar spine lesion-L1 lesion indeterminate.  MRI of the lumbar spine spine images independently reviewed and interpreted by me in detail summarized above.  Plan to repeat MRI in 4 months.    *Bilateral breast cancer-no family history of breast cancer but given synchronous breast cancer genetic testing has been sent.  Genetic testing with a variant of unknown significance.    *Anxiety-she is on several different medications currently.  Referral has been made to supportive oncology clinic today.  She is requesting an increase in the dose of the Xanax.  Okay to increase the dose to 1 mg nightly to help with sleep.  Further management of these medications will be deferred to supportive oncology once she has established care with them.  He has been started on gabapentin.  Continue follow-up with supportive oncology  Improved    *Left-sided chest wall pain-secondary to VATS.  Most likely neuropathic.  Continue gabapentin and Norco as needed.  Improved    *Abdominal bloating-likely secondary to ongoing radiation and gastritis.  Improved with twice daily omeprazole and Gas-X.    *Hypertension-continue lisinopril.  Blood pressure today 130/89.    *Smoking-she has not smoked in the past 2 days.  Trying to quit smoking.  Congratulated on her efforts.    *GERD  · 7/7/2021, patient complains of reflux despite the use of Prilosec 20 mg twice daily.  We will send her a prescription for Carafate slurry 4 times daily.    *Nausea secondary to chemotherapy  · Patient is seen on 7/13/2021 for triage visit having called in yesterday reporting ongoing dry heaving throughout the weekend despite using ondansetron.  She reported taking in small amounts orally and while not actually vomiting but having persistent nausea.  At that time, she was sent in prochlorperazine to rotate with the ondansetron.  She  reports improvement in her symptoms with the use of prochlorperazine.  She took a dose this morning after feeling nauseous and has since done well.  She is increasing but she is eating and drinking.  We will proceed with IV fluids today as her creatinine has increased to 1.28 with BUN of 21.  She will continue to use the prochlorperazine and rotate with ondansetron if needed.  We could consider additional IV fluids on day 3 of the next cycle.    *Port not returning blood  · Patient reports the last treatment that difficulty getting blood return.  Today nursing was unable to get blood return and have placed Activase.  If they still do not get blood return with removing the Activase, patient will be sent for port dye study.    PLAN:  1. 1 L normal saline today.  2. Patient to be sent for port dye study.  3. Rotate use of prochlorperazine ondansetron as needed for nausea.  4. Continue daily radiation to the direction of radiation oncology.  5. Continue letrozole 2.5 mg daily.  6. Continue omeprazole 20mg twice daily.  7. Continue Carafate 4 times daily.   Patient will restart this as she only took 1 dose prior to becoming more nauseous over the weekend.  8. Return 7/27/2021 for MD follow up and cycle 4 Cisplatin and Alimta.  Could consider IV fluids on day 3 of treatment to help with any nausea that may happen over the following weekend.     The patient is on high risk medication that requires close monitoring for toxicity.    KORI Gurorla

## 2021-07-13 NOTE — NURSING NOTE
No blood return despite giving Activase x 2. Pt given one liter NS IV peripherally. Message sent to the clinic nurse to place Dye study orders. Message sent to scheduling. Pt made aware and v/u.

## 2021-07-14 ENCOUNTER — APPOINTMENT (OUTPATIENT)
Dept: GENERAL RADIOLOGY | Facility: HOSPITAL | Age: 56
End: 2021-07-14

## 2021-07-16 ENCOUNTER — TELEPHONE (OUTPATIENT)
Dept: ONCOLOGY | Facility: CLINIC | Age: 56
End: 2021-07-16

## 2021-07-16 NOTE — TELEPHONE ENCOUNTER
Caller: BK     Relationship to patient: SPOUSE     Best call back number: [phone]    SPOUSE STATED THAT WIFE IS VERY UNCOMFORTABLE AND HER STOMACH LOOKS LIKE SHE SWALLOWED A BASKETBALL.   PLEASE CALL AND ADVISE  THANK YOU

## 2021-07-16 NOTE — TELEPHONE ENCOUNTER
----- Message from Kaitlin Jarquin RN sent at 7/16/2021 10:50 AM EDT -----  Wants to cancel the dye study on Monday. Can someone help them cancel this.

## 2021-07-16 NOTE — TELEPHONE ENCOUNTER
Caller: BK     Relationship to patient: SPOUSE     Best call back number: [phone]    SPOUSE STATED THAT WIFE IS VERY UNCOMFORTABLE AND HER STOMACH LOOKS LIKE SHE SWALLOWED A BASKETBALL.   PLEASE CALL AND ADVISE  THANK YOU       Spoke with pt and spouse. She states her stomach has been bloated since her last treatment. Says it is uncomfortable but not bad enough that she feels she needs to go to the ER. At first the  called without telling her but I requested to speak with her to get the full story as he could not answer all the questions. She did not feel it was bad enough to call. She states she had a small BM yesterday and the day before. She is passing gas. Is NOT having any N/V. Eating and drinking fine. No difficulty breathing. She states she is probably a little constipated and started taking miralax last night. She has some senokot at home but not taking. Instructed her she could take 2 this morning and 2 tomorrow. She understands she needs to call or go to ER if her symptoms start to worsen.      got back on the phone frustrated about her port. They do not want to go have a dye study done on Monday. They don't even want to use her port on Tuesday when she comes in. They want to just start an IV and draw her blood and do her treatment through the IV. They want to discuss with Dr. Snell about getting the port removed on Tuesday since it is supposed to be her last treatment.

## 2021-07-19 ENCOUNTER — LAB (OUTPATIENT)
Dept: LAB | Facility: HOSPITAL | Age: 56
End: 2021-07-19

## 2021-07-19 ENCOUNTER — OFFICE VISIT (OUTPATIENT)
Dept: ONCOLOGY | Facility: CLINIC | Age: 56
End: 2021-07-19

## 2021-07-19 ENCOUNTER — HOSPITAL ENCOUNTER (OUTPATIENT)
Dept: GENERAL RADIOLOGY | Facility: HOSPITAL | Age: 56
Discharge: HOME | End: 2021-07-19
Admitting: NURSE PRACTITIONER

## 2021-07-19 VITALS
BODY MASS INDEX: 31.52 KG/M2 | SYSTOLIC BLOOD PRESSURE: 141 MMHG | DIASTOLIC BLOOD PRESSURE: 99 MMHG | HEART RATE: 87 BPM | OXYGEN SATURATION: 95 % | HEIGHT: 63 IN | RESPIRATION RATE: 20 BRPM | TEMPERATURE: 97.3 F | WEIGHT: 177.9 LBS

## 2021-07-19 DIAGNOSIS — Z79.899 HIGH RISK MEDICATION USE: Primary | ICD-10-CM

## 2021-07-19 DIAGNOSIS — C50.411 MALIGNANT NEOPLASM OF UPPER-OUTER QUADRANT OF RIGHT BREAST IN FEMALE, ESTROGEN RECEPTOR POSITIVE (HCC): Primary | ICD-10-CM

## 2021-07-19 DIAGNOSIS — T82.868A THROMBOSIS DUE TO CENTRAL VENOUS ACCESS DEVICE, INITIAL ENCOUNTER (HCC): ICD-10-CM

## 2021-07-19 DIAGNOSIS — Z45.2 ENCOUNTER FOR FITTING AND ADJUSTMENT OF VASCULAR CATHETER: ICD-10-CM

## 2021-07-19 DIAGNOSIS — Z17.0 MALIGNANT NEOPLASM OF UPPER-OUTER QUADRANT OF RIGHT BREAST IN FEMALE, ESTROGEN RECEPTOR POSITIVE (HCC): Primary | ICD-10-CM

## 2021-07-19 DIAGNOSIS — C79.9 METASTATIC ADENOCARCINOMA (HCC): ICD-10-CM

## 2021-07-19 DIAGNOSIS — K59.03 DRUG-INDUCED CONSTIPATION: ICD-10-CM

## 2021-07-19 DIAGNOSIS — T82.898A OTHER COMPLICATION OF VASCULAR DEVICE, INITIAL ENCOUNTER (HCC): Primary | ICD-10-CM

## 2021-07-19 LAB
ANION GAP SERPL CALCULATED.3IONS-SCNC: 10.5 MMOL/L (ref 5–15)
BASOPHILS # BLD AUTO: 0.03 10*3/MM3 (ref 0–0.2)
BASOPHILS NFR BLD AUTO: 0.5 % (ref 0–1.5)
BUN SERPL-MCNC: 15 MG/DL (ref 6–20)
BUN/CREAT SERPL: 15 (ref 7.3–30)
CALCIUM SPEC-SCNC: 9.2 MG/DL (ref 8.5–10.2)
CHLORIDE SERPL-SCNC: 102 MMOL/L (ref 98–107)
CO2 SERPL-SCNC: 28.5 MMOL/L (ref 22–29)
CREAT SERPL-MCNC: 1 MG/DL (ref 0.6–1.1)
DEPRECATED RDW RBC AUTO: 49.4 FL (ref 37–54)
EOSINOPHIL # BLD AUTO: 0.03 10*3/MM3 (ref 0–0.4)
EOSINOPHIL NFR BLD AUTO: 0.5 % (ref 0.3–6.2)
ERYTHROCYTE [DISTWIDTH] IN BLOOD BY AUTOMATED COUNT: 16.1 % (ref 12.3–15.4)
GFR SERPL CREATININE-BSD FRML MDRD: 57 ML/MIN/1.73
GLUCOSE SERPL-MCNC: 137 MG/DL (ref 74–124)
HCT VFR BLD AUTO: 32.7 % (ref 34–46.6)
HGB BLD-MCNC: 10 G/DL (ref 12–15.9)
IMM GRANULOCYTES # BLD AUTO: 0.04 10*3/MM3 (ref 0–0.05)
IMM GRANULOCYTES NFR BLD AUTO: 0.7 % (ref 0–0.5)
LYMPHOCYTES # BLD AUTO: 0.93 10*3/MM3 (ref 0.7–3.1)
LYMPHOCYTES NFR BLD AUTO: 16.1 % (ref 19.6–45.3)
MCH RBC QN AUTO: 26.7 PG (ref 26.6–33)
MCHC RBC AUTO-ENTMCNC: 30.6 G/DL (ref 31.5–35.7)
MCV RBC AUTO: 87.2 FL (ref 79–97)
MONOCYTES # BLD AUTO: 0.44 10*3/MM3 (ref 0.1–0.9)
MONOCYTES NFR BLD AUTO: 7.6 % (ref 5–12)
NEUTROPHILS NFR BLD AUTO: 4.31 10*3/MM3 (ref 1.7–7)
NEUTROPHILS NFR BLD AUTO: 74.6 % (ref 42.7–76)
NRBC BLD AUTO-RTO: 0 /100 WBC (ref 0–0.2)
PLATELET # BLD AUTO: 153 10*3/MM3 (ref 140–450)
PMV BLD AUTO: 9.1 FL (ref 6–12)
POTASSIUM SERPL-SCNC: 3.7 MMOL/L (ref 3.5–4.7)
RBC # BLD AUTO: 3.75 10*6/MM3 (ref 3.77–5.28)
SODIUM SERPL-SCNC: 141 MMOL/L (ref 134–145)
WBC # BLD AUTO: 5.78 10*3/MM3 (ref 3.4–10.8)

## 2021-07-19 PROCEDURE — 85025 COMPLETE CBC W/AUTO DIFF WBC: CPT

## 2021-07-19 PROCEDURE — 0 IOPAMIDOL PER 1 ML: Performed by: NURSE PRACTITIONER

## 2021-07-19 PROCEDURE — 99215 OFFICE O/P EST HI 40 MIN: CPT | Performed by: NURSE PRACTITIONER

## 2021-07-19 PROCEDURE — 25010000002 HEPARIN LOCK FLUSH PER 10 UNITS: Performed by: INTERNAL MEDICINE

## 2021-07-19 PROCEDURE — 80048 BASIC METABOLIC PNL TOTAL CA: CPT

## 2021-07-19 PROCEDURE — 36415 COLL VENOUS BLD VENIPUNCTURE: CPT

## 2021-07-19 PROCEDURE — 36598 INJ W/FLUOR EVAL CV DEVICE: CPT

## 2021-07-19 RX ORDER — SODIUM CHLORIDE 0.9 % (FLUSH) 0.9 %
10 SYRINGE (ML) INJECTION AS NEEDED
Status: DISCONTINUED | OUTPATIENT
Start: 2021-07-19 | End: 2021-07-20 | Stop reason: HOSPADM

## 2021-07-19 RX ORDER — SODIUM CHLORIDE 0.9 % (FLUSH) 0.9 %
10 SYRINGE (ML) INJECTION AS NEEDED
Status: CANCELLED | OUTPATIENT
Start: 2021-07-19

## 2021-07-19 RX ORDER — HEPARIN SODIUM (PORCINE) LOCK FLUSH IV SOLN 100 UNIT/ML 100 UNIT/ML
500 SOLUTION INTRAVENOUS AS NEEDED
Status: CANCELLED | OUTPATIENT
Start: 2021-07-19

## 2021-07-19 RX ORDER — HEPARIN SODIUM (PORCINE) LOCK FLUSH IV SOLN 100 UNIT/ML 100 UNIT/ML
500 SOLUTION INTRAVENOUS AS NEEDED
Status: DISCONTINUED | OUTPATIENT
Start: 2021-07-19 | End: 2021-07-20 | Stop reason: HOSPADM

## 2021-07-19 RX ADMIN — Medication 10 ML: at 11:30

## 2021-07-19 RX ADMIN — IOPAMIDOL 17 ML: 755 INJECTION, SOLUTION INTRAVENOUS at 11:16

## 2021-07-19 RX ADMIN — HEPARIN 500 UNITS: 100 SYRINGE at 11:30

## 2021-07-20 DIAGNOSIS — F41.9 ANXIETY: ICD-10-CM

## 2021-07-20 NOTE — PROGRESS NOTES
RADIATION THERAPY TREATMENT SUMMARY  Patient: Holli Patel  YOB: 1965  Diagnosis:    Primary adenocarcinoma of upper lobe of left lung (CMS/HCC)   cT1, cN2, cM0       Holli Patel has recently completed the course of radiation therapy prescribed for the above-mentioned diagnosis.  Below, please find the specifics of the course of treatment delivered:    Radiation Details:    Tx Start Date  5/25/2021   Tx End date  7/12/2021   Intent   Curative   Total Treatments 33    Prescription Name LEFT LUNG  Site   Lung, Left  Primary/Boost  Primary  Dose Per Fraction 200 cGy/Frac  Number of Fractions 33  Prescribe To  Volume PTV  6600 cGy  200 cGy/Frac  Mode    Photon  Technique  VMAT  Frequency  Once Daily  Energy   6X       Tolerance and Toxicities: she tolerated the treatments well, mild SOA continued but tolerated well, requiring no treatment breaks. she completed the treatments in 48 elapsed days.    Follow-up Plans:  I understand patient will proceed on to surgery/treatment for the breast cancers and short time frame and I will see her back to discuss those next.

## 2021-07-20 NOTE — PROGRESS NOTES
Subjective   Holli Patel is a 56 y.o. female.  Referred by Dr. 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    3/1/2021-bilateral screening mammogram-microcalcifications seen in the posterior one third of the lateral aspect of the right breast.  Area of focal asymmetry seen in the middle one third of the upper inner quadrant of the left breast.    3/1/2021-DEXA scan-T score of -2.2 on the lumbar spine and T score of -2.3 in the left hip and T score of -1.9 in the right hip.  Findings consistent with osteopenia    3/23/2021-screening lung CT-there is a 10 x 11 mm solid nodule in the left upper lobe.  Enlarged AP window lymph node measuring 14 x 10 mm.  Suggestion of a possible 9 mm left hilar lymph node.  Heavy coronary artery calcification.    3/23/2021-diagnostic mammogram bilateral-cluster of microcalcifications in the middle third lateral aspect of the right breast.  Left breast demonstrates persistence of the area of focal asymmetry in the region.    Ultrasound-left breast ultrasound at 10 o'clock position, 6 cm from the nipple there is a 0.4 cm irregular hypoechoic lesion.  Stereotactic biopsy of the right breast calcifications recommended.  Ultrasound-guided biopsy of the left breast lesion recommended    4/7/2021-right breast stereotactic biopsy and left breast ultrasound-guided biopsy  Pathology  Right breast-invasive ductal carcinoma, grade 2, lymphovascular invasion present, ER +99% strong, TX +80% moderate, HER-2 negative, Ki-67 12%  Left breast upper inner quadrant 10 o'clock position biopsy, invasive ductal carcinoma, grade   2, ER +99% strong, TX +40% strong, HER-2 2+ on immunohistochemistry, nonamplified on FISH Ki-67 10%    4/14/2021-PET/CT-FDG avid aortopulmonary window lymph node measures 0.9 cm with an SUV of 7.5.  FDG avid left hilar lymph node  measures 1 cm with an SUV of 17.2.  Irregular soft tissue density in the right breast measuring 3.7 x 3.8 cm is favored to be secondary to the recent breast biopsy.  1.1 cm pulmonary nodule within the left upper lobe with SUV 9.7 .  Sub-6 mm pulmonary nodules are present in the right lower lobe.  No evidence of lymphadenopathy or metastatic disease in the abdomen.  Focal area of FDG uptake within the central canal posterior to T11-T12 displaced demonstrating an SUV of 5.5 thought to be reactive however MRI is recommended for further evaluation.    She was seen by Dr. Molina who initially planned for breast surgery however  due to the PET abnormalities she has been referred to Dr. Kerr who plans to do a wound on 4/30/2021.  Dr. Molina's and Dr. Kerr's notes reviewed.    Patient denies any family history of breast cancer.  Her mother had lymphoma.  Maternal grandmother had colon cancer.  Prior to that screening mammogram she did not have any palpable abnormalities of either breast.    She has been a heavy smoker for about 40 years and smoked 2 packs/day.  Denies any recent weight changes, new bone pains, cough, abdominal pain nausea vomiting constipation or diarrhea.  She does have anxiety and has been on several medications.  She has recently been started on Xanax to help with the mood and also with insomnia.    Patient had a video-assisted thoracoscopy and mediastinal lymphadenectomy on 4/30/2021.  L5-L6 lymph nodes were biopsied however the small pulmonary nodule in the left upper lobe was not difficult to find.  Pathology showed moderately differentiated adenocarcinoma which is CK7 and TTF-1 positive.  Consistent with lung primary.  Ki-67 85%.    5/14/2021-MRI of the brain-minimal chronic small vessel ischemic change in the white matter.  Otherwise negative MRI.    5/20/2021-bilateral axillary ultrasound-no evidence of axillary lymphadenopathy in either axilla.  Normal-appearing bilateral axillary lymph  nodes visualized.    Cycle 1 cisplatin and Alimta on 5/25/2021.    Cycle 2 cisplatin Alimta 6/15/2021    Cycle 3 cisplatin Alimta 7/7/2021    Interval history  Patient is seen today for a triage visit due to findings of her port being backed up into the innominate vein as well as a nonocclusive thrombus at the end of the catheter extending into the SVC.    Patient denies any new onset shortness of breath, chest pain, or pain with deep breath.  She does report continued bloating with current constipation.  She is been using Senokot-S, 2 tablets daily in addition to MiraLAX.  She is using suppository as well.  She reports less than adequate fluid intake.    The following portions of the patient's history were reviewed and updated as appropriate: allergies, current medications, past family history, past medical history, past social history, past surgical history and problem list.    Past Medical History:   Diagnosis Date   • Anxiety    • Dyspnea on exertion    • SHAYY (generalized anxiety disorder)     RELATED HIGH BLOOD PRESSURE   • GERD (gastroesophageal reflux disease)    • High blood pressure     ANXIETY RELATED   • Hyperlipidemia    • Hypothyroidism    • Insomnia    • Lung cancer (CMS/HCC)    • Lung nodule    • Malignant neoplasm of upper-outer quadrant of right breast in female, estrogen receptor positive (CMS/HCC) 4/13/2021   • Migraine    • PONV (postoperative nausea and vomiting)         Past Surgical History:   Procedure Laterality Date   • BREAST BIOPSY Bilateral 04/07/2021    Right Breast 10 o'clock & Left breast 10 o'clock 6 cm from nipple, [facility]   • CLOSED REDUCTION HIP DISLOCATION Left 4/30/2021    Procedure: BRONCHOSCOPY, LEFT VIDEO ASSITED THORACOSCOPY, ROBOTIC ASSSITED MEDIASTINAL LYMPHADENECTOMY WITH INTERCOSTAL NERVE BLOCKS;  Surgeon: Raphael Kerr III, MD;  Location: [facility];  Service: [facility];  Laterality: Left;   • COLONOSCOPY     • VENOUS ACCESS DEVICE (PORT) INSERTION Right 5/20/2021     "Procedure: INSERTION VENOUS ACCESS DEVICE;  Surgeon: Raphael Kerr III, MD;  Location: [facility] OR;  Service: Thoracic;  Laterality: Right;   • WRIST SURGERY Right         Family History   Problem Relation Age of Onset   • Cancer Father         LYMPHATIC   • Alcohol abuse Brother    • Colon cancer Maternal Grandmother    • Malig Hyperthermia Neg Hx         Social History     Socioeconomic History   • Marital status:      Spouse name: Not on file   • Number of children: Not on file   • Years of education: Not on file   • Highest education level: Not on file   Tobacco Use   • Smoking status: Current Every Day Smoker     Packs/day: 1.00     Types: Electronic Cigarette, Cigarettes     Start date: 1981   • Smokeless tobacco: Never Used   • Tobacco comment: ABT 15 CIGARETTES DAILY   Vaping Use   • Vaping Use: Never used   Substance and Sexual Activity   • Alcohol use: Never   • Drug use: Never   • Sexual activity: Yes        OB History        2    Para   2    Term   2            AB        Living           SAB        TAB        Ectopic        Molar        Multiple        Live Births                Age of menarche-10  Age at menopause-42  Oral contraceptive pill use-15 years  No HRT use  She has 2 children  Age at first live childbirth-17    No Known Allergies     Review of Systems   Review of systems as mentioned in the HPI    Objective   Blood pressure 141/99, pulse 87, temperature 97.3 °F (36.3 °C), temperature source Temporal, resp. rate 20, height 160 cm (62.99\"), weight 80.7 kg (177 lb 14.4 oz), SpO2 95 %, not currently breastfeeding.       Physical Exam  Vitals reviewed.   HENT:      Head: Normocephalic.   Eyes:      Extraocular Movements: Extraocular movements intact.      Conjunctiva/sclera: Conjunctivae normal.      Pupils: Pupils are equal, round, and reactive to light.   Cardiovascular:      Rate and Rhythm: Normal rate and regular rhythm.      Pulses: Normal pulses.      Heart " sounds: Normal heart sounds.   Pulmonary:      Effort: Pulmonary effort is normal.      Breath sounds: Normal breath sounds.   Abdominal:      General: Abdomen is flat. Bowel sounds are normal. There is distension.      Palpations: There is no mass.   Musculoskeletal:         General: No swelling. Normal range of motion.      Cervical back: Normal range of motion.   Skin:     General: Skin is warm.   Neurological:      General: No focal deficit present.      Mental Status: She is alert and oriented to person, place, and time.   Psychiatric:         Mood and Affect: Mood normal.         Behavior: Behavior normal.     Breast Exam: Right breast with a large postbiopsy hematoma in the upper outer quadrant measuring 4 cm.  Visible ecchymosis.  No other palpable abnormalities.  Left breast with no palpable abnormalities.  No right or left axillary lymphadenopathy-NOT PERFORMED TODAY        Lab on 07/19/2021   Component Date Value Ref Range Status   • Glucose 07/19/2021 137* 74 - 124 mg/dL Final   • BUN 07/19/2021 15  6 - 20 mg/dL Final   • Creatinine 07/19/2021 1.00  0.60 - 1.10 mg/dL Final   • Sodium 07/19/2021 141  134 - 145 mmol/L Final   • Potassium 07/19/2021 3.7  3.5 - 4.7 mmol/L Final   • Chloride 07/19/2021 102  98 - 107 mmol/L Final   • CO2 07/19/2021 28.5  22.0 - 29.0 mmol/L Final   • Calcium 07/19/2021 9.2  8.5 - 10.2 mg/dL Final   • eGFR Non African Amer 07/19/2021 57* >60 mL/min/1.73 Final   • BUN/Creatinine Ratio 07/19/2021 15.0  7.3 - 30.0 Final   • Anion Gap 07/19/2021 10.5  5.0 - 15.0 mmol/L Final   • WBC 07/19/2021 5.78  3.40 - 10.80 10*3/mm3 Final   • RBC 07/19/2021 3.75* 3.77 - 5.28 10*6/mm3 Final   • Hemoglobin 07/19/2021 10.0* 12.0 - 15.9 g/dL Final   • Hematocrit 07/19/2021 32.7* 34.0 - 46.6 % Final   • MCV 07/19/2021 87.2  79.0 - 97.0 fL Final   • MCH 07/19/2021 26.7  26.6 - 33.0 pg Final   • MCHC 07/19/2021 30.6* 31.5 - 35.7 g/dL Final   • RDW 07/19/2021 16.1* 12.3 - 15.4 % Final   • RDW-SD  07/19/2021 49.4  37.0 - 54.0 fl Final   • MPV 07/19/2021 9.1  6.0 - 12.0 fL Final   • Platelets 07/19/2021 153  140 - 450 10*3/mm3 Final   • Neutrophil % 07/19/2021 74.6  42.7 - 76.0 % Final   • Lymphocyte % 07/19/2021 16.1* 19.6 - 45.3 % Final   • Monocyte % 07/19/2021 7.6  5.0 - 12.0 % Final   • Eosinophil % 07/19/2021 0.5  0.3 - 6.2 % Final   • Basophil % 07/19/2021 0.5  0.0 - 1.5 % Final   • Immature Grans % 07/19/2021 0.7* 0.0 - 0.5 % Final   • Neutrophils, Absolute 07/19/2021 4.31  1.70 - 7.00 10*3/mm3 Final   • Lymphocytes, Absolute 07/19/2021 0.93  0.70 - 3.10 10*3/mm3 Final   • Monocytes, Absolute 07/19/2021 0.44  0.10 - 0.90 10*3/mm3 Final   • Eosinophils, Absolute 07/19/2021 0.03  0.00 - 0.40 10*3/mm3 Final   • Basophils, Absolute 07/19/2021 0.03  0.00 - 0.20 10*3/mm3 Final   • Immature Grans, Absolute 07/19/2021 0.04  0.00 - 0.05 10*3/mm3 Final   • nRBC 07/19/2021 0.0  0.0 - 0.2 /100 WBC Final   Infusion on 07/13/2021   Component Date Value Ref Range Status   • Glucose 07/13/2021 137* 74 - 124 mg/dL Final   • BUN 07/13/2021 21* 6 - 20 mg/dL Final   • Creatinine 07/13/2021 1.28* 0.60 - 1.10 mg/dL Final   • Sodium 07/13/2021 138  134 - 145 mmol/L Final   • Potassium 07/13/2021 3.5  3.5 - 4.7 mmol/L Final   • Chloride 07/13/2021 99  98 - 107 mmol/L Final   • CO2 07/13/2021 25.2  22.0 - 29.0 mmol/L Final   • Calcium 07/13/2021 9.0  8.5 - 10.2 mg/dL Final   • Total Protein 07/13/2021 6.9  6.3 - 8.0 g/dL Final   • Albumin 07/13/2021 3.80  3.50 - 5.20 g/dL Final   • ALT (SGPT) 07/13/2021 13  0 - 33 U/L Final   • AST (SGOT) 07/13/2021 13  0 - 32 U/L Final   • Alkaline Phosphatase 07/13/2021 98  38 - 116 U/L Final   • Total Bilirubin 07/13/2021 0.3  0.2 - 1.2 mg/dL Final   • eGFR Non African Amer 07/13/2021 43* >60 mL/min/1.73 Final   • Globulin 07/13/2021 3.1  1.8 - 3.5 gm/dL Final   • A/G Ratio 07/13/2021 1.2  1.1 - 2.4 g/dL Final   • BUN/Creatinine Ratio 07/13/2021 16.4  7.3 - 30.0 Final   • Anion Gap  07/13/2021 13.8  5.0 - 15.0 mmol/L Final   • WBC 07/13/2021 3.12* 3.40 - 10.80 10*3/mm3 Final   • RBC 07/13/2021 4.51  3.77 - 5.28 10*6/mm3 Final   • Hemoglobin 07/13/2021 12.1  12.0 - 15.9 g/dL Final   • Hematocrit 07/13/2021 38.5  34.0 - 46.6 % Final   • MCV 07/13/2021 85.4  79.0 - 97.0 fL Final   • MCH 07/13/2021 26.8  26.6 - 33.0 pg Final   • MCHC 07/13/2021 31.4* 31.5 - 35.7 g/dL Final   • RDW 07/13/2021 15.9* 12.3 - 15.4 % Final   • RDW-SD 07/13/2021 48.7  37.0 - 54.0 fl Final   • MPV 07/13/2021 9.4  6.0 - 12.0 fL Final   • Platelets 07/13/2021 119* 140 - 450 10*3/mm3 Final   • Neutrophil % 07/13/2021 62.8  42.7 - 76.0 % Final   • Lymphocyte % 07/13/2021 15.1* 19.6 - 45.3 % Final   • Monocyte % 07/13/2021 18.6* 5.0 - 12.0 % Final   • Eosinophil % 07/13/2021 1.6  0.3 - 6.2 % Final   • Basophil % 07/13/2021 0.6  0.0 - 1.5 % Final   • Immature Grans % 07/13/2021 1.3* 0.0 - 0.5 % Final   • Neutrophils, Absolute 07/13/2021 1.96  1.70 - 7.00 10*3/mm3 Final   • Lymphocytes, Absolute 07/13/2021 0.47* 0.70 - 3.10 10*3/mm3 Final   • Monocytes, Absolute 07/13/2021 0.58  0.10 - 0.90 10*3/mm3 Final   • Eosinophils, Absolute 07/13/2021 0.05  0.00 - 0.40 10*3/mm3 Final   • Basophils, Absolute 07/13/2021 0.02  0.00 - 0.20 10*3/mm3 Final   • Immature Grans, Absolute 07/13/2021 0.04  0.00 - 0.05 10*3/mm3 Final   • nRBC 07/13/2021 0.0  0.0 - 0.2 /100 WBC Final   Infusion on 07/07/2021   Component Date Value Ref Range Status   • Glucose 07/07/2021 114  74 - 124 mg/dL Final   • BUN 07/07/2021 13  6 - 20 mg/dL Final   • Creatinine 07/07/2021 0.87  0.60 - 1.10 mg/dL Final   • Sodium 07/07/2021 142  134 - 145 mmol/L Final   • Potassium 07/07/2021 4.7  3.5 - 4.7 mmol/L Final   • Chloride 07/07/2021 104  98 - 107 mmol/L Final   • CO2 07/07/2021 27.8  22.0 - 29.0 mmol/L Final   • Calcium 07/07/2021 9.8  8.5 - 10.2 mg/dL Final   • Total Protein 07/07/2021 6.9  6.3 - 8.0 g/dL Final   • Albumin 07/07/2021 4.10  3.50 - 5.20 g/dL Final   •  ALT (SGPT) 07/07/2021 12  0 - 33 U/L Final   • AST (SGOT) 07/07/2021 12  0 - 32 U/L Final   • Alkaline Phosphatase 07/07/2021 124* 38 - 116 U/L Final   • Total Bilirubin 07/07/2021 <0.2* 0.2 - 1.2 mg/dL Final   • eGFR Non  Amer 07/07/2021 67  >60 mL/min/1.73 Final   • Globulin 07/07/2021 2.8  1.8 - 3.5 gm/dL Final   • A/G Ratio 07/07/2021 1.5  1.1 - 2.4 g/dL Final   • BUN/Creatinine Ratio 07/07/2021 14.9  7.3 - 30.0 Final   • Anion Gap 07/07/2021 10.2  5.0 - 15.0 mmol/L Final   • Magnesium 07/07/2021 1.7* 1.8 - 2.5 mg/dL Final   • WBC 07/07/2021 8.20  3.40 - 10.80 10*3/mm3 Final   • RBC 07/07/2021 4.09  3.77 - 5.28 10*6/mm3 Final   • Hemoglobin 07/07/2021 11.0* 12.0 - 15.9 g/dL Final   • Hematocrit 07/07/2021 35.3  34.0 - 46.6 % Final   • MCV 07/07/2021 86.3  79.0 - 97.0 fL Final   • MCH 07/07/2021 26.9  26.6 - 33.0 pg Final   • MCHC 07/07/2021 31.2* 31.5 - 35.7 g/dL Final   • RDW 07/07/2021 16.2* 12.3 - 15.4 % Final   • RDW-SD 07/07/2021 49.5  37.0 - 54.0 fl Final   • MPV 07/07/2021 8.8  6.0 - 12.0 fL Final   • Platelets 07/07/2021 360  140 - 450 10*3/mm3 Final   • Neutrophil % 07/07/2021 67.3  42.7 - 76.0 % Final   • Lymphocyte % 07/07/2021 15.9* 19.6 - 45.3 % Final   • Monocyte % 07/07/2021 12.4* 5.0 - 12.0 % Final   • Eosinophil % 07/07/2021 0.6  0.3 - 6.2 % Final   • Basophil % 07/07/2021 0.5  0.0 - 1.5 % Final   • Immature Grans % 07/07/2021 3.3* 0.0 - 0.5 % Final   • Neutrophils, Absolute 07/07/2021 5.52  1.70 - 7.00 10*3/mm3 Final   • Lymphocytes, Absolute 07/07/2021 1.30  0.70 - 3.10 10*3/mm3 Final   • Monocytes, Absolute 07/07/2021 1.02* 0.10 - 0.90 10*3/mm3 Final   • Eosinophils, Absolute 07/07/2021 0.05  0.00 - 0.40 10*3/mm3 Final   • Basophils, Absolute 07/07/2021 0.04  0.00 - 0.20 10*3/mm3 Final   • Immature Grans, Absolute 07/07/2021 0.27* 0.00 - 0.05 10*3/mm3 Final   • nRBC 07/07/2021 0.2  0.0 - 0.2 /100 WBC Final   Infusion on 06/22/2021   Component Date Value Ref Range Status   • Glucose  06/22/2021 109  74 - 124 mg/dL Final   • BUN 06/22/2021 16  6 - 20 mg/dL Final   • Creatinine 06/22/2021 0.93  0.60 - 1.10 mg/dL Final   • Sodium 06/22/2021 139  134 - 145 mmol/L Final   • Potassium 06/22/2021 4.3  3.5 - 4.7 mmol/L Final   • Chloride 06/22/2021 101  98 - 107 mmol/L Final   • CO2 06/22/2021 25.6  22.0 - 29.0 mmol/L Final   • Calcium 06/22/2021 9.2  8.5 - 10.2 mg/dL Final   • Total Protein 06/22/2021 6.8  6.3 - 8.0 g/dL Final   • Albumin 06/22/2021 4.00  3.50 - 5.20 g/dL Final   • ALT (SGPT) 06/22/2021 21  0 - 33 U/L Final   • AST (SGOT) 06/22/2021 11  0 - 32 U/L Final   • Alkaline Phosphatase 06/22/2021 102  38 - 116 U/L Final   • Total Bilirubin 06/22/2021 0.3  0.2 - 1.2 mg/dL Final   • eGFR Non  Amer 06/22/2021 62  >60 mL/min/1.73 Final   • Globulin 06/22/2021 2.8  1.8 - 3.5 gm/dL Final   • A/G Ratio 06/22/2021 1.4  1.1 - 2.4 g/dL Final   • BUN/Creatinine Ratio 06/22/2021 17.2  7.3 - 30.0 Final   • Anion Gap 06/22/2021 12.4  5.0 - 15.0 mmol/L Final   • WBC 06/22/2021 4.70  3.40 - 10.80 10*3/mm3 Final   • RBC 06/22/2021 4.87  3.77 - 5.28 10*6/mm3 Final   • Hemoglobin 06/22/2021 12.9  12.0 - 15.9 g/dL Final   • Hematocrit 06/22/2021 41.5  34.0 - 46.6 % Final   • MCV 06/22/2021 85.2  79.0 - 97.0 fL Final   • MCH 06/22/2021 26.5* 26.6 - 33.0 pg Final   • MCHC 06/22/2021 31.1* 31.5 - 35.7 g/dL Final   • RDW 06/22/2021 14.2  12.3 - 15.4 % Final   • RDW-SD 06/22/2021 43.1  37.0 - 54.0 fl Final   • MPV 06/22/2021 9.3  6.0 - 12.0 fL Final   • Platelets 06/22/2021 187  140 - 450 10*3/mm3 Final   • Neutrophil % 06/22/2021 64.8  42.7 - 76.0 % Final   • Lymphocyte % 06/22/2021 24.5  19.6 - 45.3 % Final   • Monocyte % 06/22/2021 6.6  5.0 - 12.0 % Final   • Eosinophil % 06/22/2021 2.6  0.3 - 6.2 % Final   • Basophil % 06/22/2021 0.6  0.0 - 1.5 % Final   • Immature Grans % 06/22/2021 0.9* 0.0 - 0.5 % Final   • Neutrophils, Absolute 06/22/2021 3.05  1.70 - 7.00 10*3/mm3 Final   • Lymphocytes, Absolute  06/22/2021 1.15  0.70 - 3.10 10*3/mm3 Final   • Monocytes, Absolute 06/22/2021 0.31  0.10 - 0.90 10*3/mm3 Final   • Eosinophils, Absolute 06/22/2021 0.12  0.00 - 0.40 10*3/mm3 Final   • Basophils, Absolute 06/22/2021 0.03  0.00 - 0.20 10*3/mm3 Final   • Immature Grans, Absolute 06/22/2021 0.04  0.00 - 0.05 10*3/mm3 Final   • nRBC 06/22/2021 0.0  0.0 - 0.2 /100 WBC Final        IR Port Study Including Fluoro    Result Date: 7/19/2021  /IMPRESSION: Right subclavian port port. Catheter has backed up with tip in the proximal right innominate vein. Suspected fibrin sheath. Additionally, nonocclusive filling defect in the right innominate vein distal to catheter tip somewhat extending into proximal SVC is suggestive of thrombus. Findings related to Sherron Saldaña APRN at 1208 hours 07/19/2021.  This report was finalized on 7/19/2021 12:10 PM by Dr. Russell Aldana M.D.           Assessment/Plan      *Bilateral breast cancer  · Invasive ductal carcinoma in both right and left breast and lesions measure under 1 cm.  No palpable lymphadenopathy although unsure if this has been evaluated by an axillary ultrasound.  No abnormal axillary lymphadenopathy noted on PET.  · Most likely clinical T1b N0 M0, both cancers were noted to be grade 2, ER/HI positive and HER-2 negative and Ki-67 less than 15%  VATS on 4/30/2021  Biopsy confirms adenocarcinoma of pulmonary primary  Discussed with Dr. Molina about delaying breast surgery and she is in agreement  We discussed starting neoadjuvant endocrine therapy with anastrozole 1 mg p.o. daily  · Patient experienced significant adverse effects with anastrozole  · Treatment changed to letrozole   · Axillary ultrasound 4/20/2021 - for any axillary lymphadenopathy  · Started letrozole in May 2021  · She is tolerating letrozole fairly well.  · Reports being compliant with the same  · The right breast hematoma is relatively unchanged at 4 cm.  No other palpable abnormalities of either  breast.  · 7/7/2021, continues on letrozole 2.5 mg daily, and continues to tolerate this reasonably well.    *Adenocarcinoma of the left lung  · Most likely stage T1 N2 M0, stage III  · MRI of the thoracic spine ordered to further evaluate the abnormality seen on PET  · MRI of the brain negative   · Given that she is only 55 and fairly good performance status I discussed with her by with concurrent chemoradiation with cisplatin and Alimta every 3 weeks followed by durvalumab.  · Adverse effects including but not limited to myelosuppression, increased risk of infections, nausea, vomiting, renal dysfunction, ototoxicity, neurotoxicity have been discussed at length.  · She has been recommended to start folic acid and she will be administered B12 injection today.  · MRI of the thoracic spine performed 5/24/2021 shows no evidence of metastatic disease in the thoracic spine however in L1 there is a 7 mm lesion which is indeterminate.  A lumbar spine MRI has been recommended.  · Dr. Snell personally discussed her case with Dr. Oates and he feels like MRI of the lumbar spine may also show that this lesion is indeterminate.  · Discussed the same with the patient and her .  · Even if this is metastatic disease this might be the only lesion of metastatic disease and then would consider this oligometastatic disease.  · Therefore plan to proceed with concurrent chemoradiation as scheduled.  · Labs reviewed and she is okay to proceed with cisplatin and Alimta, C1D1 5/25/2021  · Day 1 of radiation 5/25/2021  · 5/27/2021-MRI of the lumbar spine-there is a 6 x 5 x 6 mm enhancing lesion in the left posterior body of L1 vertebra.  This is considered indeterminate.  Follow-up recommended.  Degenerative changes noted at L5-S1  · 7/7/2021, cycle 3 cisplatin Alimta    *Lumbar spine lesion-L1 lesion indeterminate.  MRI of the lumbar spine spine images independently reviewed and interpreted by me in detail summarized above.  Plan  to repeat MRI in 4 months.    *Bilateral breast cancer-no family history of breast cancer but given synchronous breast cancer genetic testing has been sent.  Genetic testing with a variant of unknown significance.    *Anxiety-she is on several different medications currently.  Referral has been made to supportive oncology clinic today.  She is requesting an increase in the dose of the Xanax.  Okay to increase the dose to 1 mg nightly to help with sleep.  Further management of these medications will be deferred to supportive oncology once she has established care with them.  He has been started on gabapentin.  Continue follow-up with supportive oncology  Improved    *Left-sided chest wall pain-secondary to VATS.  Most likely neuropathic.  Continue gabapentin and Norco as needed.  Improved    *Abdominal bloating-likely secondary to ongoing radiation and gastritis.  Improved with twice daily omeprazole and Gas-X.  Patient reports this is reoccurred.  She is using Senokot-S 2 tabs per day and have asked her to increase to 2 tabs twice per day.  She will increase her fluid intake to 48 to 64 ounces per day.  She will use milk of magnesia as needed.    *Hypertension-continue lisinopril.  Blood pressure today 141/99.    *Smoking-she has not smoked in the past 2 days.  Trying to quit smoking.  Congratulated on her efforts.  This is not discussed today.    *GERD  · 7/7/2021, patient complains of reflux despite the use of Prilosec 20 mg twice daily.  We will send her a prescription for Carafate slurry 4 times daily.    *Nausea secondary to chemotherapy  · Patient is seen on 7/13/2021 for triage visit having called in yesterday reporting ongoing dry heaving throughout the weekend despite using ondansetron.  She reported taking in small amounts orally and while not actually vomiting but having persistent nausea.  At that time, she was sent in prochlorperazine to rotate with the ondansetron.  She reports improvement in her  symptoms with the use of prochlorperazine.  She took a dose this morning after feeling nauseous and has since done well.  She is increasing but she is eating and drinking.  We will proceed with IV fluids today as her creatinine has increased to 1.28 with BUN of 21.  She will continue to use the prochlorperazine and rotate with ondansetron if needed.  We could consider additional IV fluids on day 3 of the next cycle.    *Port not returning blood  · Patient reports the last treatment that difficulty getting blood return.  Today nursing was unable to get blood return and have placed Activase without success.  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  The end of the catheter has backed up into the innominate vein.  This was discussed with Dr. Snell and there is no need for additional imaging at this time.  WE WILL NOT USE THE PORT.  When patient returns next week for treatment she will be treated peripherally.  Once patient is completed her last cycle of treatment we will set her up for repeat imaging.  At that time we can consider whether the port needs to be removed or replaced.  Case was discussed with KORI Miller with cardiothoracic surgery who is in agreement with the plan.  · Patient will initiate Eliquis 10 mg twice daily x7 days then 5 mg twice daily thereafter.  She was given a co-pay card today and prescription was called to her pharmacy.  We reviewed possible side effects with the medication the patient verbalized understanding.  She will stop any NSAIDs.  ·   PLAN:  1. DO NOT USE PORT  2. Initiate Eliquis 10 mg twice daily x7 days then 5 mg twice daily thereafter.  Prescription sent to pharmacy and patient given co-pay card.  3. Patient to call for any new onset bleeding or large bruising.  4. Increase Senokot-S to 2 tablets twice daily  increase fluids to 48 to 64 ounces daily and use milk of magnesia as needed.  5. Continue letrozole 2.5 mg daily.  6. Continue omeprazole 20mg twice daily.  7. Continue Carafate 4 times daily.   8. Return 7/27/2021 for MD follow up and cycle 4 Cisplatin and Alimta.  Could consider IV fluids on day 3 of treatment to help with any nausea that may happen over the following weekend.     The patient is on high risk medication that requires close monitoring for toxicity.  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  Case was discussed with her treating oncologist/hematologist Dr. Snell as well as consulting provider, KORI Miller and Dr. Aldana in radiology.    KORI Gurrola

## 2021-07-20 NOTE — TELEPHONE ENCOUNTER
Caller: Holli Patel    Relationship: Self    Best call back number: [phone] (H)      Medication needed:   Requested Prescriptions     Pending Prescriptions Disp Refills   • ALPRAZolam (XANAX) 0.5 MG tablet 45 tablet 1     Sig: Take 1 tablet by mouth 2 (Two) Times a Day As Needed for Anxiety. for anxiety       When do you need the refill by: 07/20/21    What additional details did the patient provide when requesting the medication: PATIENT STATES HAS 3 PILLS LEFT    Does the patient have less than a 3 day supply:  [x] Yes  [] No    What is the patient's preferred pharmacy: [address] [facility] AT [facility] [address] [facility] [phone] [facility] [phone] FX

## 2021-07-22 RX ORDER — ALPRAZOLAM 0.5 MG/1
0.5 TABLET ORAL 2 TIMES DAILY PRN
Qty: 45 TABLET | Refills: 0 | Status: SHIPPED | OUTPATIENT
Start: 2021-07-22 | End: 2021-08-03 | Stop reason: SDUPTHER

## 2021-07-23 RX ORDER — RIZATRIPTAN BENZOATE 10 MG/1
TABLET ORAL
Qty: 12 TABLET | Refills: 0 | Status: SHIPPED | OUTPATIENT
Start: 2021-07-23 | End: 2021-08-12

## 2021-07-26 NOTE — TELEPHONE ENCOUNTER
Prochlorperazine refill request rec electronically from [facility]. Per last office note from Sherron PAN, NP-pt will use this and alternate with ondansetron as it was effective.    I have routed the rx to Dr Snell for signature. Once signed it will be escribed to [facility]

## 2021-07-27 ENCOUNTER — APPOINTMENT (OUTPATIENT)
Dept: ONCOLOGY | Facility: HOSPITAL | Age: 56
End: 2021-07-27

## 2021-07-27 ENCOUNTER — INFUSION (OUTPATIENT)
Dept: ONCOLOGY | Facility: HOSPITAL | Age: 56
End: 2021-07-27

## 2021-07-27 ENCOUNTER — OFFICE VISIT (OUTPATIENT)
Dept: ONCOLOGY | Facility: CLINIC | Age: 56
End: 2021-07-27

## 2021-07-27 VITALS
HEIGHT: 63 IN | HEART RATE: 101 BPM | DIASTOLIC BLOOD PRESSURE: 94 MMHG | BODY MASS INDEX: 31.52 KG/M2 | SYSTOLIC BLOOD PRESSURE: 138 MMHG | TEMPERATURE: 98 F | RESPIRATION RATE: 20 BRPM

## 2021-07-27 DIAGNOSIS — C34.12 PRIMARY ADENOCARCINOMA OF UPPER LOBE OF LEFT LUNG (HCC): Primary | ICD-10-CM

## 2021-07-27 DIAGNOSIS — Z17.0 MALIGNANT NEOPLASM OF UPPER-OUTER QUADRANT OF RIGHT BREAST IN FEMALE, ESTROGEN RECEPTOR POSITIVE (HCC): ICD-10-CM

## 2021-07-27 DIAGNOSIS — C50.411 MALIGNANT NEOPLASM OF UPPER-OUTER QUADRANT OF RIGHT BREAST IN FEMALE, ESTROGEN RECEPTOR POSITIVE (HCC): ICD-10-CM

## 2021-07-27 DIAGNOSIS — C79.9 METASTATIC ADENOCARCINOMA (HCC): ICD-10-CM

## 2021-07-27 LAB
ALBUMIN SERPL-MCNC: 3.9 G/DL (ref 3.5–5.2)
ALBUMIN/GLOB SERPL: 1.3 G/DL (ref 1.1–2.4)
ALP SERPL-CCNC: 122 U/L (ref 38–116)
ALT SERPL W P-5'-P-CCNC: 10 U/L (ref 0–33)
ANION GAP SERPL CALCULATED.3IONS-SCNC: 12.1 MMOL/L (ref 5–15)
AST SERPL-CCNC: 17 U/L (ref 0–32)
BASOPHILS # BLD AUTO: 0.05 10*3/MM3 (ref 0–0.2)
BASOPHILS NFR BLD AUTO: 0.8 % (ref 0–1.5)
BILIRUB SERPL-MCNC: <0.2 MG/DL (ref 0.2–1.2)
BUN SERPL-MCNC: 13 MG/DL (ref 6–20)
BUN/CREAT SERPL: 15.3 (ref 7.3–30)
CALCIUM SPEC-SCNC: 9.2 MG/DL (ref 8.5–10.2)
CHLORIDE SERPL-SCNC: 104 MMOL/L (ref 98–107)
CO2 SERPL-SCNC: 24.9 MMOL/L (ref 22–29)
CREAT SERPL-MCNC: 0.85 MG/DL (ref 0.6–1.1)
DEPRECATED RDW RBC AUTO: 60.2 FL (ref 37–54)
EOSINOPHIL # BLD AUTO: 0.1 10*3/MM3 (ref 0–0.4)
EOSINOPHIL NFR BLD AUTO: 1.6 % (ref 0.3–6.2)
ERYTHROCYTE [DISTWIDTH] IN BLOOD BY AUTOMATED COUNT: 19.2 % (ref 12.3–15.4)
GFR SERPL CREATININE-BSD FRML MDRD: 69 ML/MIN/1.73
GLOBULIN UR ELPH-MCNC: 3 GM/DL (ref 1.8–3.5)
GLUCOSE SERPL-MCNC: 103 MG/DL (ref 74–124)
HCT VFR BLD AUTO: 34.6 % (ref 34–46.6)
HGB BLD-MCNC: 10.4 G/DL (ref 12–15.9)
IMM GRANULOCYTES # BLD AUTO: 0.07 10*3/MM3 (ref 0–0.05)
IMM GRANULOCYTES NFR BLD AUTO: 1.1 % (ref 0–0.5)
LYMPHOCYTES # BLD AUTO: 0.78 10*3/MM3 (ref 0.7–3.1)
LYMPHOCYTES NFR BLD AUTO: 12.3 % (ref 19.6–45.3)
MAGNESIUM SERPL-MCNC: 1.5 MG/DL (ref 1.8–2.5)
MCH RBC QN AUTO: 26.9 PG (ref 26.6–33)
MCHC RBC AUTO-ENTMCNC: 30.1 G/DL (ref 31.5–35.7)
MCV RBC AUTO: 89.4 FL (ref 79–97)
MONOCYTES # BLD AUTO: 0.54 10*3/MM3 (ref 0.1–0.9)
MONOCYTES NFR BLD AUTO: 8.5 % (ref 5–12)
NEUTROPHILS NFR BLD AUTO: 4.79 10*3/MM3 (ref 1.7–7)
NEUTROPHILS NFR BLD AUTO: 75.7 % (ref 42.7–76)
NRBC BLD AUTO-RTO: 0 /100 WBC (ref 0–0.2)
PLATELET # BLD AUTO: 391 10*3/MM3 (ref 140–450)
PMV BLD AUTO: 9 FL (ref 6–12)
POTASSIUM SERPL-SCNC: 4.4 MMOL/L (ref 3.5–4.7)
PROT SERPL-MCNC: 6.9 G/DL (ref 6.3–8)
RBC # BLD AUTO: 3.87 10*6/MM3 (ref 3.77–5.28)
SODIUM SERPL-SCNC: 141 MMOL/L (ref 134–145)
WBC # BLD AUTO: 6.33 10*3/MM3 (ref 3.4–10.8)

## 2021-07-27 PROCEDURE — 25010000002 MAGNESIUM SULFATE PER 500 MG OF MAGNESIUM: Performed by: INTERNAL MEDICINE

## 2021-07-27 PROCEDURE — 83735 ASSAY OF MAGNESIUM: CPT

## 2021-07-27 PROCEDURE — 99215 OFFICE O/P EST HI 40 MIN: CPT | Performed by: INTERNAL MEDICINE

## 2021-07-27 PROCEDURE — 25010000002 POTASSIUM CHLORIDE PER 2 MEQ OF POTASSIUM: Performed by: INTERNAL MEDICINE

## 2021-07-27 PROCEDURE — 96365 THER/PROPH/DIAG IV INF INIT: CPT

## 2021-07-27 PROCEDURE — 85025 COMPLETE CBC W/AUTO DIFF WBC: CPT

## 2021-07-27 PROCEDURE — 25010000002 MAGNESIUM SULFATE 2 GM/50ML SOLUTION: Performed by: INTERNAL MEDICINE

## 2021-07-27 PROCEDURE — 96367 TX/PROPH/DG ADDL SEQ IV INF: CPT

## 2021-07-27 PROCEDURE — 80053 COMPREHEN METABOLIC PANEL: CPT

## 2021-07-27 RX ORDER — OMEPRAZOLE 20 MG/1
20 CAPSULE, DELAYED RELEASE ORAL 2 TIMES DAILY
Qty: 60 CAPSULE | Refills: 1 | Status: SHIPPED | OUTPATIENT
Start: 2021-07-27 | End: 2021-07-30 | Stop reason: SDUPTHER

## 2021-07-27 RX ORDER — MAGNESIUM SULFATE HEPTAHYDRATE 40 MG/ML
2 INJECTION, SOLUTION INTRAVENOUS ONCE
Status: COMPLETED | OUTPATIENT
Start: 2021-07-27 | End: 2021-07-27

## 2021-07-27 RX ORDER — SODIUM CHLORIDE 9 MG/ML
250 INJECTION, SOLUTION INTRAVENOUS ONCE
Status: COMPLETED | OUTPATIENT
Start: 2021-07-27 | End: 2021-07-27

## 2021-07-27 RX ORDER — PROCHLORPERAZINE MALEATE 10 MG
10 TABLET ORAL EVERY 8 HOURS PRN
Qty: 30 TABLET | Refills: 0 | Status: SHIPPED | OUTPATIENT
Start: 2021-07-27 | End: 2021-08-16

## 2021-07-27 RX ORDER — SODIUM CHLORIDE 9 MG/ML
500 INJECTION, SOLUTION INTRAVENOUS ONCE
Status: COMPLETED | OUTPATIENT
Start: 2021-07-27 | End: 2021-07-27

## 2021-07-27 RX ORDER — PROCHLORPERAZINE MALEATE 10 MG
TABLET ORAL EVERY 6 HOURS PRN
Qty: 60 TABLET | Refills: 5 | OUTPATIENT
Start: 2021-07-27

## 2021-07-27 RX ADMIN — SODIUM CHLORIDE 500 ML: 9 INJECTION, SOLUTION INTRAVENOUS at 09:46

## 2021-07-27 RX ADMIN — SODIUM CHLORIDE 250 ML: 9 INJECTION, SOLUTION INTRAVENOUS at 09:06

## 2021-07-27 RX ADMIN — MAGNESIUM SULFATE IN WATER 2 G: 40 INJECTION, SOLUTION INTRAVENOUS at 09:46

## 2021-07-27 NOTE — NURSING NOTE
0930: Dr. Snell at bedside for MD visit today. Pt is finished with radiation, so no need for any chemo today.  I did start the pre-hydration that was scheduled for today.  She instructed to stop the pre hydration since no chemo.  Mag 1.5 and Dr. Snell would like for pt to have a little bit of extra fluids today.     So pt will only get Mag 2gm IV and NS 500ml. Informed pharmacy and pt v/u.

## 2021-07-27 NOTE — PROGRESS NOTES
Subjective   Holli Patel is a 56 y.o. female.  Referred by Dr. 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    3/1/2021-bilateral screening mammogram-microcalcifications seen in the posterior one third of the lateral aspect of the right breast.  Area of focal asymmetry seen in the middle one third of the upper inner quadrant of the left breast.    3/1/2021-DEXA scan-T score of -2.2 on the lumbar spine and T score of -2.3 in the left hip and T score of -1.9 in the right hip.  Findings consistent with osteopenia    3/23/2021-screening lung CT-there is a 10 x 11 mm solid nodule in the left upper lobe.  Enlarged AP window lymph node measuring 14 x 10 mm.  Suggestion of a possible 9 mm left hilar lymph node.  Heavy coronary artery calcification.    3/23/2021-diagnostic mammogram bilateral-cluster of microcalcifications in the middle third lateral aspect of the right breast.  Left breast demonstrates persistence of the area of focal asymmetry in the region.    Ultrasound-left breast ultrasound at 10 o'clock position, 6 cm from the nipple there is a 0.4 cm irregular hypoechoic lesion.  Stereotactic biopsy of the right breast calcifications recommended.  Ultrasound-guided biopsy of the left breast lesion recommended    4/7/2021-right breast stereotactic biopsy and left breast ultrasound-guided biopsy  Pathology  Right breast-invasive ductal carcinoma, grade 2, lymphovascular invasion present, ER +99% strong, OK +80% moderate, HER-2 negative, Ki-67 12%  Left breast upper inner quadrant 10 o'clock position biopsy, invasive ductal carcinoma, grade   2, ER +99% strong, OK +40% strong, HER-2 2+ on immunohistochemistry, nonamplified on FISH Ki-67 10%    4/14/2021-PET/CT-FDG avid aortopulmonary window lymph node measures 0.9 cm with an SUV of 7.5.  FDG avid left hilar lymph node  measures 1 cm with an SUV of 17.2.  Irregular soft tissue density in the right breast measuring 3.7 x 3.8 cm is favored to be secondary to the recent breast biopsy.  1.1 cm pulmonary nodule within the left upper lobe with SUV 9.7 .  Sub-6 mm pulmonary nodules are present in the right lower lobe.  No evidence of lymphadenopathy or metastatic disease in the abdomen.  Focal area of FDG uptake within the central canal posterior to T11-T12 displaced demonstrating an SUV of 5.5 thought to be reactive however MRI is recommended for further evaluation.    She was seen by Dr. Molina who initially planned for breast surgery however  due to the PET abnormalities she has been referred to Dr. Kerr who plans to do a wound on 4/30/2021.  Dr. Molina's and Dr. Kerr's notes reviewed.    Patient denies any family history of breast cancer.  Her mother had lymphoma.  Maternal grandmother had colon cancer.  Prior to that screening mammogram she did not have any palpable abnormalities of either breast.    She has been a heavy smoker for about 40 years and smoked 2 packs/day.  Denies any recent weight changes, new bone pains, cough, abdominal pain nausea vomiting constipation or diarrhea.  She does have anxiety and has been on several medications.  She has recently been started on Xanax to help with the mood and also with insomnia.    Patient had a video-assisted thoracoscopy and mediastinal lymphadenectomy on 4/30/2021.  L5-L6 lymph nodes were biopsied however the small pulmonary nodule in the left upper lobe was not difficult to find.  Pathology showed moderately differentiated adenocarcinoma which is CK7 and TTF-1 positive.  Consistent with lung primary.  Ki-67 85%.    5/14/2021-MRI of the brain-minimal chronic small vessel ischemic change in the white matter.  Otherwise negative MRI.    5/20/2021-bilateral axillary ultrasound-no evidence of axillary lymphadenopathy in either axilla.  Normal-appearing bilateral axillary lymph  nodes visualized.    Cycle 1 cisplatin and Alimta on 5/25/2021.    Cycle 2 cisplatin Alimta 6/15/2021    Cycle 3 cisplatin Alimta 7/7/2021    Completed radiation on 7/12/2021    Port was nonfunctional hence a port study was performed which showed that the port was backed up into the innominate vein and also there was a nonocclusive thrombus at the end of the catheter extending into the superior vena cava.  She was started on anticoagulation with Eliquis.  It was recommended for port revision of the intention is to keep the port.    Interval history  Following the last cycle of chemotherapy she felt extremely tired and fatigued.  This was significantly worse compared to the previous 2 cycles.  She denied any mucositis.  She also continues to have GERD for which she has been using PPIs.  Nausea better controlled with Compazine.  She has been taking Compazine round-the-clock.  Denies any lightheadedness or dizziness.  The abdominal bloating has improved.  She has been compliant with Eliquis.  Denies any excessive bleeding or bruising  She has noticed bilateral lower extremity edema particularly in the ankles.  She is also experiencing dyspnea on exertion.    The following portions of the patient's history were reviewed and updated as appropriate: allergies, current medications, past family history, past medical history, past social history, past surgical history and problem list.    Past Medical History:   Diagnosis Date   • Anxiety    • Dyspnea on exertion    • SHAYY (generalized anxiety disorder)     RELATED HIGH BLOOD PRESSURE   • GERD (gastroesophageal reflux disease)    • High blood pressure     ANXIETY RELATED   • Hyperlipidemia    • Hypothyroidism    • Insomnia    • Lung cancer (CMS/HCC)    • Lung nodule    • Malignant neoplasm of upper-outer quadrant of right breast in female, estrogen receptor positive (CMS/HCC) 4/13/2021   • Migraine    • PONV (postoperative nausea and vomiting)         Past Surgical History:    Procedure Laterality Date   • BREAST BIOPSY Bilateral 2021    Right Breast 10 o'clock & Left breast 10 o'clock 6 cm from nipple, [facility]   • CLOSED REDUCTION HIP DISLOCATION Left 2021    Procedure: BRONCHOSCOPY, LEFT VIDEO ASSITED THORACOSCOPY, ROBOTIC ASSSITED MEDIASTINAL LYMPHADENECTOMY WITH INTERCOSTAL NERVE BLOCKS;  Surgeon: Raphael Kerr III, MD;  Location: [facility] OR;  Service: DaVinci;  Laterality: Left;   • COLONOSCOPY     • VENOUS ACCESS DEVICE (PORT) INSERTION Right 2021    Procedure: INSERTION VENOUS ACCESS DEVICE;  Surgeon: Raphael Kerr III, MD;  Location: [facility] OR;  Service: Thoracic;  Laterality: Right;   • WRIST SURGERY Right         Family History   Problem Relation Age of Onset   • Cancer Father         LYMPHATIC   • Alcohol abuse Brother    • Colon cancer Maternal Grandmother    • Malig Hyperthermia Neg Hx         Social History     Socioeconomic History   • Marital status:      Spouse name: Not on file   • Number of children: Not on file   • Years of education: Not on file   • Highest education level: Not on file   Tobacco Use   • Smoking status: Current Every Day Smoker     Packs/day: 1.00     Types: Electronic Cigarette, Cigarettes     Start date: 1981   • Smokeless tobacco: Never Used   • Tobacco comment: ABT 15 CIGARETTES DAILY   Vaping Use   • Vaping Use: Never used   Substance and Sexual Activity   • Alcohol use: Never   • Drug use: Never   • Sexual activity: Yes        OB History        2    Para   2    Term   2            AB        Living           SAB        TAB        Ectopic        Molar        Multiple        Live Births                Age of menarche-10  Age at menopause-42  Oral contraceptive pill use-15 years  No HRT use  She has 2 children  Age at first live childbirth-17    No Known Allergies     Review of Systems   Review of systems as mentioned in the HPI    Objective   Blood pressure 138/94, pulse 101, temperature 98 °F  "(36.7 °C), temperature source Temporal, resp. rate 20, height 160 cm (62.99\"), not currently breastfeeding.       Physical Exam  Vitals reviewed.   HENT:      Head: Normocephalic.   Eyes:      Extraocular Movements: Extraocular movements intact.      Conjunctiva/sclera: Conjunctivae normal.      Pupils: Pupils are equal, round, and reactive to light.   Cardiovascular:      Rate and Rhythm: Normal rate and regular rhythm.      Pulses: Normal pulses.      Heart sounds: Normal heart sounds.   Pulmonary:      Effort: Pulmonary effort is normal.      Breath sounds: Normal breath sounds.   Abdominal:      General: Abdomen is flat. Bowel sounds are normal. There is distension.      Palpations: There is no mass.   Musculoskeletal:         General: No swelling. Normal range of motion.      Cervical back: Normal range of motion.   Skin:     General: Skin is warm.   Neurological:      General: No focal deficit present.      Mental Status: She is alert and oriented to person, place, and time.   Psychiatric:         Mood and Affect: Mood normal.         Behavior: Behavior normal.     Breast Exam: Right breast with a large postbiopsy hematoma in the upper outer quadrant measuring 5 cm.    No other palpable abnormalities.  Left breast with no palpable abnormalities.  No right or left axillary lymphadenopathy    I have reexamined the patient and the results are consistent with the previously documented exam. Salma Snell MD         Infusion on 07/27/2021   Component Date Value Ref Range Status   • WBC 07/27/2021 6.33  3.40 - 10.80 10*3/mm3 Final   • RBC 07/27/2021 3.87  3.77 - 5.28 10*6/mm3 Final   • Hemoglobin 07/27/2021 10.4* 12.0 - 15.9 g/dL Final   • Hematocrit 07/27/2021 34.6  34.0 - 46.6 % Final   • MCV 07/27/2021 89.4  79.0 - 97.0 fL Final   • MCH 07/27/2021 26.9  26.6 - 33.0 pg Final   • MCHC 07/27/2021 30.1* 31.5 - 35.7 g/dL Final   • RDW 07/27/2021 19.2* 12.3 - 15.4 % Final   • RDW-SD 07/27/2021 60.2* 37.0 - 54.0 fl " Final   • MPV 07/27/2021 9.0  6.0 - 12.0 fL Final   • Platelets 07/27/2021 391  140 - 450 10*3/mm3 Final   • Neutrophil % 07/27/2021 75.7  42.7 - 76.0 % Final   • Lymphocyte % 07/27/2021 12.3* 19.6 - 45.3 % Final   • Monocyte % 07/27/2021 8.5  5.0 - 12.0 % Final   • Eosinophil % 07/27/2021 1.6  0.3 - 6.2 % Final   • Basophil % 07/27/2021 0.8  0.0 - 1.5 % Final   • Immature Grans % 07/27/2021 1.1* 0.0 - 0.5 % Final   • Neutrophils, Absolute 07/27/2021 4.79  1.70 - 7.00 10*3/mm3 Final   • Lymphocytes, Absolute 07/27/2021 0.78  0.70 - 3.10 10*3/mm3 Final   • Monocytes, Absolute 07/27/2021 0.54  0.10 - 0.90 10*3/mm3 Final   • Eosinophils, Absolute 07/27/2021 0.10  0.00 - 0.40 10*3/mm3 Final   • Basophils, Absolute 07/27/2021 0.05  0.00 - 0.20 10*3/mm3 Final   • Immature Grans, Absolute 07/27/2021 0.07* 0.00 - 0.05 10*3/mm3 Final   • nRBC 07/27/2021 0.0  0.0 - 0.2 /100 WBC Final   Lab on 07/19/2021   Component Date Value Ref Range Status   • Glucose 07/19/2021 137* 74 - 124 mg/dL Final   • BUN 07/19/2021 15  6 - 20 mg/dL Final   • Creatinine 07/19/2021 1.00  0.60 - 1.10 mg/dL Final   • Sodium 07/19/2021 141  134 - 145 mmol/L Final   • Potassium 07/19/2021 3.7  3.5 - 4.7 mmol/L Final   • Chloride 07/19/2021 102  98 - 107 mmol/L Final   • CO2 07/19/2021 28.5  22.0 - 29.0 mmol/L Final   • Calcium 07/19/2021 9.2  8.5 - 10.2 mg/dL Final   • eGFR Non African Amer 07/19/2021 57* >60 mL/min/1.73 Final   • BUN/Creatinine Ratio 07/19/2021 15.0  7.3 - 30.0 Final   • Anion Gap 07/19/2021 10.5  5.0 - 15.0 mmol/L Final   • WBC 07/19/2021 5.78  3.40 - 10.80 10*3/mm3 Final   • RBC 07/19/2021 3.75* 3.77 - 5.28 10*6/mm3 Final   • Hemoglobin 07/19/2021 10.0* 12.0 - 15.9 g/dL Final   • Hematocrit 07/19/2021 32.7* 34.0 - 46.6 % Final   • MCV 07/19/2021 87.2  79.0 - 97.0 fL Final   • MCH 07/19/2021 26.7  26.6 - 33.0 pg Final   • MCHC 07/19/2021 30.6* 31.5 - 35.7 g/dL Final   • RDW 07/19/2021 16.1* 12.3 - 15.4 % Final   • RDW-SD 07/19/2021  49.4  37.0 - 54.0 fl Final   • MPV 07/19/2021 9.1  6.0 - 12.0 fL Final   • Platelets 07/19/2021 153  140 - 450 10*3/mm3 Final   • Neutrophil % 07/19/2021 74.6  42.7 - 76.0 % Final   • Lymphocyte % 07/19/2021 16.1* 19.6 - 45.3 % Final   • Monocyte % 07/19/2021 7.6  5.0 - 12.0 % Final   • Eosinophil % 07/19/2021 0.5  0.3 - 6.2 % Final   • Basophil % 07/19/2021 0.5  0.0 - 1.5 % Final   • Immature Grans % 07/19/2021 0.7* 0.0 - 0.5 % Final   • Neutrophils, Absolute 07/19/2021 4.31  1.70 - 7.00 10*3/mm3 Final   • Lymphocytes, Absolute 07/19/2021 0.93  0.70 - 3.10 10*3/mm3 Final   • Monocytes, Absolute 07/19/2021 0.44  0.10 - 0.90 10*3/mm3 Final   • Eosinophils, Absolute 07/19/2021 0.03  0.00 - 0.40 10*3/mm3 Final   • Basophils, Absolute 07/19/2021 0.03  0.00 - 0.20 10*3/mm3 Final   • Immature Grans, Absolute 07/19/2021 0.04  0.00 - 0.05 10*3/mm3 Final   • nRBC 07/19/2021 0.0  0.0 - 0.2 /100 WBC Final   Infusion on 07/13/2021   Component Date Value Ref Range Status   • Glucose 07/13/2021 137* 74 - 124 mg/dL Final   • BUN 07/13/2021 21* 6 - 20 mg/dL Final   • Creatinine 07/13/2021 1.28* 0.60 - 1.10 mg/dL Final   • Sodium 07/13/2021 138  134 - 145 mmol/L Final   • Potassium 07/13/2021 3.5  3.5 - 4.7 mmol/L Final   • Chloride 07/13/2021 99  98 - 107 mmol/L Final   • CO2 07/13/2021 25.2  22.0 - 29.0 mmol/L Final   • Calcium 07/13/2021 9.0  8.5 - 10.2 mg/dL Final   • Total Protein 07/13/2021 6.9  6.3 - 8.0 g/dL Final   • Albumin 07/13/2021 3.80  3.50 - 5.20 g/dL Final   • ALT (SGPT) 07/13/2021 13  0 - 33 U/L Final   • AST (SGOT) 07/13/2021 13  0 - 32 U/L Final   • Alkaline Phosphatase 07/13/2021 98  38 - 116 U/L Final   • Total Bilirubin 07/13/2021 0.3  0.2 - 1.2 mg/dL Final   • eGFR Non African Amer 07/13/2021 43* >60 mL/min/1.73 Final   • Globulin 07/13/2021 3.1  1.8 - 3.5 gm/dL Final   • A/G Ratio 07/13/2021 1.2  1.1 - 2.4 g/dL Final   • BUN/Creatinine Ratio 07/13/2021 16.4  7.3 - 30.0 Final   • Anion Gap 07/13/2021 13.8   5.0 - 15.0 mmol/L Final   • WBC 07/13/2021 3.12* 3.40 - 10.80 10*3/mm3 Final   • RBC 07/13/2021 4.51  3.77 - 5.28 10*6/mm3 Final   • Hemoglobin 07/13/2021 12.1  12.0 - 15.9 g/dL Final   • Hematocrit 07/13/2021 38.5  34.0 - 46.6 % Final   • MCV 07/13/2021 85.4  79.0 - 97.0 fL Final   • MCH 07/13/2021 26.8  26.6 - 33.0 pg Final   • MCHC 07/13/2021 31.4* 31.5 - 35.7 g/dL Final   • RDW 07/13/2021 15.9* 12.3 - 15.4 % Final   • RDW-SD 07/13/2021 48.7  37.0 - 54.0 fl Final   • MPV 07/13/2021 9.4  6.0 - 12.0 fL Final   • Platelets 07/13/2021 119* 140 - 450 10*3/mm3 Final   • Neutrophil % 07/13/2021 62.8  42.7 - 76.0 % Final   • Lymphocyte % 07/13/2021 15.1* 19.6 - 45.3 % Final   • Monocyte % 07/13/2021 18.6* 5.0 - 12.0 % Final   • Eosinophil % 07/13/2021 1.6  0.3 - 6.2 % Final   • Basophil % 07/13/2021 0.6  0.0 - 1.5 % Final   • Immature Grans % 07/13/2021 1.3* 0.0 - 0.5 % Final   • Neutrophils, Absolute 07/13/2021 1.96  1.70 - 7.00 10*3/mm3 Final   • Lymphocytes, Absolute 07/13/2021 0.47* 0.70 - 3.10 10*3/mm3 Final   • Monocytes, Absolute 07/13/2021 0.58  0.10 - 0.90 10*3/mm3 Final   • Eosinophils, Absolute 07/13/2021 0.05  0.00 - 0.40 10*3/mm3 Final   • Basophils, Absolute 07/13/2021 0.02  0.00 - 0.20 10*3/mm3 Final   • Immature Grans, Absolute 07/13/2021 0.04  0.00 - 0.05 10*3/mm3 Final   • nRBC 07/13/2021 0.0  0.0 - 0.2 /100 WBC Final   Infusion on 07/07/2021   Component Date Value Ref Range Status   • Glucose 07/07/2021 114  74 - 124 mg/dL Final   • BUN 07/07/2021 13  6 - 20 mg/dL Final   • Creatinine 07/07/2021 0.87  0.60 - 1.10 mg/dL Final   • Sodium 07/07/2021 142  134 - 145 mmol/L Final   • Potassium 07/07/2021 4.7  3.5 - 4.7 mmol/L Final   • Chloride 07/07/2021 104  98 - 107 mmol/L Final   • CO2 07/07/2021 27.8  22.0 - 29.0 mmol/L Final   • Calcium 07/07/2021 9.8  8.5 - 10.2 mg/dL Final   • Total Protein 07/07/2021 6.9  6.3 - 8.0 g/dL Final   • Albumin 07/07/2021 4.10  3.50 - 5.20 g/dL Final   • ALT (SGPT)  07/07/2021 12  0 - 33 U/L Final   • AST (SGOT) 07/07/2021 12  0 - 32 U/L Final   • Alkaline Phosphatase 07/07/2021 124* 38 - 116 U/L Final   • Total Bilirubin 07/07/2021 <0.2* 0.2 - 1.2 mg/dL Final   • eGFR Non  Amer 07/07/2021 67  >60 mL/min/1.73 Final   • Globulin 07/07/2021 2.8  1.8 - 3.5 gm/dL Final   • A/G Ratio 07/07/2021 1.5  1.1 - 2.4 g/dL Final   • BUN/Creatinine Ratio 07/07/2021 14.9  7.3 - 30.0 Final   • Anion Gap 07/07/2021 10.2  5.0 - 15.0 mmol/L Final   • Magnesium 07/07/2021 1.7* 1.8 - 2.5 mg/dL Final   • WBC 07/07/2021 8.20  3.40 - 10.80 10*3/mm3 Final   • RBC 07/07/2021 4.09  3.77 - 5.28 10*6/mm3 Final   • Hemoglobin 07/07/2021 11.0* 12.0 - 15.9 g/dL Final   • Hematocrit 07/07/2021 35.3  34.0 - 46.6 % Final   • MCV 07/07/2021 86.3  79.0 - 97.0 fL Final   • MCH 07/07/2021 26.9  26.6 - 33.0 pg Final   • MCHC 07/07/2021 31.2* 31.5 - 35.7 g/dL Final   • RDW 07/07/2021 16.2* 12.3 - 15.4 % Final   • RDW-SD 07/07/2021 49.5  37.0 - 54.0 fl Final   • MPV 07/07/2021 8.8  6.0 - 12.0 fL Final   • Platelets 07/07/2021 360  140 - 450 10*3/mm3 Final   • Neutrophil % 07/07/2021 67.3  42.7 - 76.0 % Final   • Lymphocyte % 07/07/2021 15.9* 19.6 - 45.3 % Final   • Monocyte % 07/07/2021 12.4* 5.0 - 12.0 % Final   • Eosinophil % 07/07/2021 0.6  0.3 - 6.2 % Final   • Basophil % 07/07/2021 0.5  0.0 - 1.5 % Final   • Immature Grans % 07/07/2021 3.3* 0.0 - 0.5 % Final   • Neutrophils, Absolute 07/07/2021 5.52  1.70 - 7.00 10*3/mm3 Final   • Lymphocytes, Absolute 07/07/2021 1.30  0.70 - 3.10 10*3/mm3 Final   • Monocytes, Absolute 07/07/2021 1.02* 0.10 - 0.90 10*3/mm3 Final   • Eosinophils, Absolute 07/07/2021 0.05  0.00 - 0.40 10*3/mm3 Final   • Basophils, Absolute 07/07/2021 0.04  0.00 - 0.20 10*3/mm3 Final   • Immature Grans, Absolute 07/07/2021 0.27* 0.00 - 0.05 10*3/mm3 Final   • nRBC 07/07/2021 0.2  0.0 - 0.2 /100 WBC Final        IR Port Study Including Fluoro    Result Date: 7/19/2021  /IMPRESSION: Right  subclavian port port. Catheter has backed up with tip in the proximal right innominate vein. Suspected fibrin sheath. Additionally, nonocclusive filling defect in the right innominate vein distal to catheter tip somewhat extending into proximal SVC is suggestive of thrombus. Findings related to Sherron Saldaña APRN at 1208 hours 07/19/2021.  This report was finalized on 7/19/2021 12:10 PM by Dr. Russell Aldana M.D.       7/27/[id number] 6.33, hemoglobin 10.4, platelets 391  CMP-BUN 13, creatinine 0.85, alkaline phosphatase elevated at 122 otherwise normal LFTs  Magnesium low at 1.5    Assessment/Plan      *Bilateral breast cancer  · Invasive ductal carcinoma in both right and left breast and lesions measure under 1 cm.  No palpable lymphadenopathy although unsure if this has been evaluated by an axillary ultrasound.  No abnormal axillary lymphadenopathy noted on PET.  · Most likely clinical T1b N0 M0, both cancers were noted to be grade 2, ER/CT positive and HER-2 negative and Ki-67 less than 15%  VATS on 4/30/2021  Biopsy confirms adenocarcinoma of pulmonary primary  Discussed with Dr. Molina about delaying breast surgery and she is in agreement  We discussed starting neoadjuvant endocrine therapy with anastrozole 1 mg p.o. daily  · Patient experienced significant adverse effects with anastrozole  · Treatment changed to letrozole   · Axillary ultrasound 4/20/2021 - for any axillary lymphadenopathy  · Started letrozole in May 2021  · She is tolerating letrozole fairly well.  · Reports being compliant with the same  · The right breast hematoma is relatively unchanged at 5 cm.  No other palpable abnormalities of either breast.  · She has been compliant with the letrozole.  · Continue the same.  · We will obtain a mammogram and ultrasound of both breasts prior to follow-up to assess the tumors.  · She continues to have a fairly large sized hematoma in the right breast.      *Adenocarcinoma of the left lung  · Most  likely stage T1 N2 M0, stage III  · MRI of the thoracic spine ordered to further evaluate the abnormality seen on PET  · MRI of the brain negative   · Given that she is only 55 and fairly good performance status I discussed with her by with concurrent chemoradiation with cisplatin and Alimta every 3 weeks followed by durvalumab.  · Adverse effects including but not limited to myelosuppression, increased risk of infections, nausea, vomiting, renal dysfunction, ototoxicity, neurotoxicity have been discussed at length.  · MRI of the thoracic spine performed 5/24/2021 shows no evidence of metastatic disease in the thoracic spine however in L1 there is a 7 mm lesion which is indeterminate.  A lumbar spine MRI has been recommended.  · Discussed her case with Dr. Oates and he feels like MRI of the lumbar spine may also show that this lesion is indeterminate.  · Discussed the same with the patient and her .  · Even if this is metastatic disease this might be the only lesion of metastatic disease and then would consider this oligometastatic disease.  · Therefore plan to proceed with concurrent chemoradiation as scheduled.  · cisplatin and Alimta, C1D1 5/25/2021  · Day 1 of radiation 5/25/2021  · 5/27/2021-MRI of the lumbar spine-there is a 6 x 5 x 6 mm enhancing lesion in the left posterior body of L1 vertebra.  This is considered indeterminate.  Follow-up recommended.  Degenerative changes noted at L5-S1  · 7/7/2021, cycle 3 cisplatin Alimta  · 7/12/2021-radiation completed  · We will obtain a PET/CT to assess response and if the disease is stable or improve then we will proceed with durvalumab.  · It will be administered every 2 weeks for total of a year as long as there is no disease progression.  · Adverse effects of durvalumab including but not limited to pneumonitis, hepatitis, colitis, endocrinopathies have been discussed.  · Chemotherapy education will be performed again    *Lumbar spine lesion-L1 lesion  indeterminate.  MRI of the lumbar spine spine images independently reviewed and interpreted by me in detail summarized above.  Plan to repeat MRI in 4 months.  This will be due in September 2021    *Bilateral breast cancer-no family history of breast cancer but given synchronous breast cancer genetic testing has been sent.  Genetic testing with a variant of unknown significance.    *Anxiety-she is on several different medications currently.  Referral has been made to supportive oncology clinic today.  She is requesting an increase in the dose of the Xanax.  Okay to increase the dose to 1 mg nightly to help with sleep.  Further management of these medications will be deferred to supportive oncology once she has established care with them.  He has been started on gabapentin.  Continue follow-up with supportive oncology  Improved    *Left-sided chest wall pain-secondary to VATS.  Most likely neuropathic.  Continue gabapentin and Norco as needed.  Improved    *Abdominal bloating-this has improved.  Continue senna S and MiraLAX as needed    *Hypertension-continue lisinopril.  Blood pressure 138/94    *Smoking-she has not smoked in the past 2 days.  Trying to quit smoking.  Congratulated on her efforts.  Not addressed today    *GERD  · 7/7/2021, patient complains of reflux despite the use of Prilosec 20 mg twice daily.  We will send her a prescription for Carafate slurry 4 times daily.  · She has not quite started using the Carafate.  · Continue Prilosec and recommend using Carafate    *Nausea secondary to chemotherapy  · Much better with Zofran and Compazine.  · Continue the same  · Refill of Compazine issued today    *Port not returning blood  · Patient reports the last treatment that difficulty getting blood return.  Today nursing was unable to get blood return and have placed Activase without success.  · Patient went for port dye study earlier today on 7/19/2021 with report called to me by radiology that is positive  for a nonocclusive thrombus at the end of the port catheter and extending into the SVC.  The end of the catheter has backed up into the innominate vein.  This was discussed with Dr. Snell and there is no need for additional imaging at this time.  WE WILL NOT USE THE PORT.   · She has been started on Eliquis.  Completed a week of 10 mg twice daily and now on 5 mg.  Continue the same  · We will obtain imaging to see if the disease is responded.  If patient wishes to get the port out then we will continue with anticoagulation for 3 months and remove the port.  However if she wishes to keep the port then a port revision will have to be performed.  Aura Carmona with CT surgery will be notified.    PLAN:  1. PET/CT  2. Continue Eliquis 5 mg twice daily  3. Start durvalumab if PET/CT shows stable to improved disease  4. Bilateral mammogram and ultrasound prior to follow-up  5. Continue letrozole 2.5 mg daily.  6. Continue omeprazole 20mg twice daily.  7. Continue Carafate 4 times daily.   8. Follow-up on 11/20/2021 to review PET/CT as well as mammogram     The patient is on high risk medication that requires close monitoring for toxicity.  Multiple issues addressed today including anticoagulation, treatment for lung cancer and discussion of durvalumab, management of bilateral breast cancer, adverse effects from chemotherapy.  42 minutes spent on the encounter.    Salma Snell MD

## 2021-07-29 DIAGNOSIS — R59.0 MEDIASTINAL ADENOPATHY: ICD-10-CM

## 2021-07-30 RX ORDER — OMEPRAZOLE 20 MG/1
20 CAPSULE, DELAYED RELEASE ORAL 2 TIMES DAILY
Qty: 180 CAPSULE | Refills: 0 | Status: SHIPPED | OUTPATIENT
Start: 2021-07-30 | End: 2022-05-02 | Stop reason: SDUPTHER

## 2021-07-31 ENCOUNTER — TELEPHONE (OUTPATIENT)
Dept: ONCOLOGY | Facility: CLINIC | Age: 56
End: 2021-07-31

## 2021-07-31 RX ORDER — GABAPENTIN 300 MG/1
CAPSULE ORAL
Qty: 90 CAPSULE | Refills: 0 | Status: SHIPPED | OUTPATIENT
Start: 2021-07-31 | End: 2022-02-14 | Stop reason: SDUPTHER

## 2021-08-02 RX ORDER — DOCUSATE SODIUM -SENNOSIDES 50; 8.6 MG/1; MG/1
TABLET, COATED ORAL
Qty: 60 TABLET | Refills: 1 | Status: SHIPPED | OUTPATIENT
Start: 2021-08-02 | End: 2022-06-28

## 2021-08-03 ENCOUNTER — OFFICE VISIT (OUTPATIENT)
Dept: FAMILY MEDICINE CLINIC | Facility: CLINIC | Age: 56
End: 2021-08-03

## 2021-08-03 VITALS
DIASTOLIC BLOOD PRESSURE: 80 MMHG | OXYGEN SATURATION: 98 % | HEIGHT: 63 IN | SYSTOLIC BLOOD PRESSURE: 124 MMHG | HEART RATE: 88 BPM | TEMPERATURE: 98.5 F | WEIGHT: 175 LBS | BODY MASS INDEX: 31.01 KG/M2

## 2021-08-03 DIAGNOSIS — E03.8 OTHER SPECIFIED HYPOTHYROIDISM: ICD-10-CM

## 2021-08-03 DIAGNOSIS — E78.2 MIXED HYPERLIPIDEMIA: ICD-10-CM

## 2021-08-03 DIAGNOSIS — F51.01 PRIMARY INSOMNIA: ICD-10-CM

## 2021-08-03 DIAGNOSIS — C50.411 MALIGNANT NEOPLASM OF UPPER-OUTER QUADRANT OF RIGHT BREAST IN FEMALE, ESTROGEN RECEPTOR POSITIVE (HCC): ICD-10-CM

## 2021-08-03 DIAGNOSIS — Z17.0 MALIGNANT NEOPLASM OF UPPER-INNER QUADRANT OF LEFT BREAST IN FEMALE, ESTROGEN RECEPTOR POSITIVE (HCC): ICD-10-CM

## 2021-08-03 DIAGNOSIS — Z87.891 PERSONAL HISTORY OF TOBACCO USE: ICD-10-CM

## 2021-08-03 DIAGNOSIS — C34.12 PRIMARY ADENOCARCINOMA OF UPPER LOBE OF LEFT LUNG (HCC): Primary | ICD-10-CM

## 2021-08-03 DIAGNOSIS — I10 ESSENTIAL HYPERTENSION: ICD-10-CM

## 2021-08-03 DIAGNOSIS — Z17.0 MALIGNANT NEOPLASM OF UPPER-OUTER QUADRANT OF RIGHT BREAST IN FEMALE, ESTROGEN RECEPTOR POSITIVE (HCC): ICD-10-CM

## 2021-08-03 DIAGNOSIS — C50.212 MALIGNANT NEOPLASM OF UPPER-INNER QUADRANT OF LEFT BREAST IN FEMALE, ESTROGEN RECEPTOR POSITIVE (HCC): ICD-10-CM

## 2021-08-03 DIAGNOSIS — F41.9 ANXIETY: ICD-10-CM

## 2021-08-03 PROBLEM — R63.5 WEIGHT GAIN: Status: RESOLVED | Noted: 2021-03-15 | Resolved: 2021-08-03

## 2021-08-03 PROCEDURE — 99215 OFFICE O/P EST HI 40 MIN: CPT | Performed by: FAMILY MEDICINE

## 2021-08-03 RX ORDER — APIXABAN 5 MG/1
5 TABLET, FILM COATED ORAL 2 TIMES DAILY
COMMUNITY
Start: 2021-07-19 | End: 2021-08-23

## 2021-08-03 RX ORDER — BUPROPION HYDROCHLORIDE 300 MG/1
300 TABLET ORAL DAILY
Qty: 30 TABLET | Refills: 5 | Status: SHIPPED | OUTPATIENT
Start: 2021-08-03 | End: 2022-02-16 | Stop reason: SDUPTHER

## 2021-08-03 RX ORDER — ALPRAZOLAM 0.5 MG/1
0.5 TABLET ORAL 2 TIMES DAILY PRN
Qty: 45 TABLET | Refills: 2 | Status: SHIPPED | OUTPATIENT
Start: 2021-08-21 | End: 2021-08-17 | Stop reason: SDUPTHER

## 2021-08-03 NOTE — PROGRESS NOTES
Subjective   Holli Patel is a 56 y.o. female.     Vitals:    08/03/21 1406   BP: 124/80   Pulse: 88   Temp: 98.5 °F (36.9 °C)   SpO2: 98%        Chief Complaint   Patient presents with   • Anxiety      MED REFILL REVIEW RECENT DIAGNOSIS LUNG AND BREAST CANCER   • Hyperlipidemia   • Hypothyroidism        History of Present Illness    4-month follow-up on chronic anxiety, insomnia, migraines, HTN, hyperlipidemia, and recent diagnoses of lung cancer and bilateral breast cancer    LOV with me in March for routine med refills, follow-up on screening tests from physical, and dyspnea.  At that visit --she was found to have an abnormal mammogram thus she was referred to general surgeon/treatment and oncologist/ [facility].  Also, a CT of chest was ordered --and unfortunately this was consistent with suspicion for lung carcinoma as well.    Thus, the last several months have been very difficult as she has been dealing with 2 cancer diagnoses --adenocarcinoma of the lung and bilateral breast cancer.  These are 2 separate primaries, nonmetastatic..  Current plan of treatment --- first treating the primary lung carcinoma, recently finished both radiation and chemotherapy.  Then planning treatment of the bilateral breast carcinoma with lumpectomy and radiation.  Per patient, thus far metastatic work-up is negative.    All considering, she is doing remarkably well.  She has handled both XRT and chemo without any major side effects or hospitalizations.  Only complication has been a recent blood clot in her port and now she is on Eliquis.  She undergoes routine blood work on a regular basis with onc/Dr. Snell.  Yet, as to be expected she is under a great deal of stress and anxiety.  Currently taking Wellbutrin 150 mg daily and Xanax 1.5 tablets daily, 1 in a.m., half in p.m.  Unfortunately, she still continues to smoke, although decreased amount.  Weight stable.    Due for refills and 6-month labs.  Compliant with and tolerates  all medications without side effects.  Last Urine Tox screen for compliance was in November 2020 and consistent with prescription history.  [facility] report reviewed by me today shows last refill for Xanax a quantity of 45 on 7/23/2021.  She has been started on Neurontin from her oncologist due to significant pain and discomfort after her breast biopsy.  Apparently, this led to a significant hematoma.      The following portions of the patient's history were reviewed and updated as appropriate: allergies, current medications, past family history, past medical history, past social history, past surgical history and problem list.    Review of Systems   Constitutional: Negative for fever, unexpected weight gain and unexpected weight loss.   Respiratory: Positive for cough and shortness of breath.        Objective   Physical Exam  Vitals and nursing note reviewed.   Constitutional:       Appearance: Normal appearance. She is well-developed. She is obese.   HENT:      Head: Normocephalic and atraumatic.      Nose: Nose normal.   Eyes:      Conjunctiva/sclera: Conjunctivae normal.      Pupils: Pupils are equal, round, and reactive to light.   Neck:      Thyroid: No thyromegaly.   Cardiovascular:      Rate and Rhythm: Normal rate and regular rhythm.      Heart sounds: Normal heart sounds. No murmur heard.     Pulmonary:      Effort: Pulmonary effort is normal.      Breath sounds: Normal breath sounds.   Abdominal:      General: Abdomen is flat. Bowel sounds are normal. There is no distension.      Palpations: Abdomen is soft. There is no hepatomegaly, splenomegaly or mass.      Tenderness: There is no abdominal tenderness. There is no guarding or rebound.      Hernia: No hernia is present.   Musculoskeletal:         General: Normal range of motion.      Cervical back: Normal range of motion and neck supple.      Right lower leg: No edema.      Left lower leg: No edema.   Lymphadenopathy:      Cervical: No cervical adenopathy.    Skin:     General: Skin is warm.   Neurological:      General: No focal deficit present.      Mental Status: She is alert.   Psychiatric:         Mood and Affect: Mood normal.         Behavior: Behavior normal.         Thought Content: Thought content normal.         Judgment: Judgment normal.          LABS/STUDIES  -July 2021 --magnesium 1.5, Hgb 10.4, normal CMP  November 2020 --lipids WNL, TSH 2.6    Procedures     Assessment/Plan   Diagnoses and all orders for this visit:    1. Primary adenocarcinoma of upper lobe of left lung (CMS/HCC) (Primary) --new diagnosis  S/P XRT and chemo recently.  Per oncologist/Dr. Snell --plans for repeat PET scan this week.  Discussed with patient again the importance to DC tobacco!  We will try to increase dose of Wellbutrin to help with tobacco cessation.    2. Malignant neoplasm of upper-outer quadrant of right breast in female, estrogen receptor positive (CMS/HCC)  -new diagnosis.  2nd primary.  Per surgeon and oncologist --plans are for lumpectomy with XRT in near future.  Counseled at length.  I initially consulted with patient on phone when this was 1st diagnosed back in March 2021.  Have been aware of current treatment plans.    3. Malignant neoplasm of upper-inner quadrant of left breast in female, estrogen receptor positive (CMS/HCC)  - new Dx  Same plan as above.    4. Anxiety  -uncontrolled.  Not surprising given recent bad news regarding 2 new primary carcinomas.  Will increase Wellbutrin XL from 150 mg to 300 mg daily.  May continue with Xanax as prescribed below (this was increased from 1 daily to 1.5 daily at time of the initial diagnosis back in March 2021.)  Will make no change at this time with a quantity of 45 per 30 days.  Urine Tox screen/November and Star both reviewed, appropriate.  -     ALPRAZolam (XANAX) 0.5 MG tablet; Take 1 tablet by mouth 2 (Two) Times a Day As Needed for Anxiety. for anxiety  Dispense: 45 tablet; Refill: 2    5. Primary insomnia   -fair control.  See treatment plan above.    6. Mixed hyperlipidemia  -controlled.  Due for 6-month recheck on lipids and LFTs --written orders given to patient for her next lab draw with oncologist.  Ideally goal LDL needs to be less than 100, at least less than 130 given history of HTN  As long as at goal then no change with Zocor 20 mg daily, will refill upon request  Continue with low-cholesterol/low carb diet and regular cardio exercise greater than 150 minutes weekly    7. Other specified hypothyroidism  -stable.  TSH up-to-date and therapeutic.  No change with prescription.  Continue Synthroid 25 mcg every morning, will refill upon request    8. Essential hypertension  -stable.  No change of medication.  Continue Prinivil 20 mg daily, will refill upon request    9. Personal history of tobacco use  -continue to encourage to DC tobacco!  We will try to increase Wellbutrin dose to help with the tobacco cessation.    Other orders  -     Cancel: Lipid Panel With LDL / HDL Ratio  -     Cancel: TSH  -     buPROPion XL (WELLBUTRIN XL) 300 MG 24 hr tablet; Take 1 tablet by mouth Daily.  Dispense: 30 tablet; Refill: 5      Total time of visit --40 minutes --- a great deal of time was spent reviewing records and discussing with patient 2 NEW cancer diagnoses.  Also, treating uncontrolled chronic medical conditions.  And, counseling, educating, and answering all questions and concerns.           Wore goggles and face mask during entire visit.    Return in about 3 months (around 11/3/2021) for Recheck.

## 2021-08-05 ENCOUNTER — HOSPITAL ENCOUNTER (OUTPATIENT)
Dept: PET IMAGING | Facility: HOSPITAL | Age: 56
Discharge: HOME | End: 2021-08-05

## 2021-08-05 DIAGNOSIS — C34.12 PRIMARY ADENOCARCINOMA OF UPPER LOBE OF LEFT LUNG (HCC): ICD-10-CM

## 2021-08-05 DIAGNOSIS — C79.9 METASTATIC ADENOCARCINOMA (HCC): ICD-10-CM

## 2021-08-05 DIAGNOSIS — C50.411 MALIGNANT NEOPLASM OF UPPER-OUTER QUADRANT OF RIGHT BREAST IN FEMALE, ESTROGEN RECEPTOR POSITIVE (HCC): ICD-10-CM

## 2021-08-05 DIAGNOSIS — Z17.0 MALIGNANT NEOPLASM OF UPPER-OUTER QUADRANT OF RIGHT BREAST IN FEMALE, ESTROGEN RECEPTOR POSITIVE (HCC): ICD-10-CM

## 2021-08-05 LAB — GLUCOSE BLDC GLUCOMTR-MCNC: 110 MG/DL (ref 70–130)

## 2021-08-05 PROCEDURE — A9552 F18 FDG: HCPCS | Performed by: INTERNAL MEDICINE

## 2021-08-05 PROCEDURE — 78815 PET IMAGE W/CT SKULL-THIGH: CPT

## 2021-08-05 PROCEDURE — 0 FLUDEOXYGLUCOSE F18 SOLUTION: Performed by: INTERNAL MEDICINE

## 2021-08-05 PROCEDURE — 82962 GLUCOSE BLOOD TEST: CPT

## 2021-08-05 RX ADMIN — FLUDEOXYGLUCOSE F18 1 DOSE: 300 INJECTION INTRAVENOUS at 09:42

## 2021-08-06 DIAGNOSIS — C34.12 PRIMARY ADENOCARCINOMA OF UPPER LOBE OF LEFT LUNG (HCC): Primary | ICD-10-CM

## 2021-08-06 RX ORDER — GABAPENTIN 300 MG/1
CAPSULE ORAL
Qty: 90 CAPSULE | Refills: 0 | Status: SHIPPED | OUTPATIENT
Start: 2021-08-06 | End: 2021-08-25 | Stop reason: SDUPTHER

## 2021-08-11 ENCOUNTER — OFFICE VISIT (OUTPATIENT)
Dept: ONCOLOGY | Facility: CLINIC | Age: 56
End: 2021-08-11

## 2021-08-11 ENCOUNTER — TELEPHONE (OUTPATIENT)
Dept: ONCOLOGY | Facility: CLINIC | Age: 56
End: 2021-08-11

## 2021-08-11 ENCOUNTER — INFUSION (OUTPATIENT)
Dept: ONCOLOGY | Facility: HOSPITAL | Age: 56
End: 2021-08-11

## 2021-08-11 VITALS
WEIGHT: 177.3 LBS | HEART RATE: 96 BPM | BODY MASS INDEX: 31.41 KG/M2 | DIASTOLIC BLOOD PRESSURE: 69 MMHG | OXYGEN SATURATION: 93 % | SYSTOLIC BLOOD PRESSURE: 108 MMHG

## 2021-08-11 DIAGNOSIS — C34.12 PRIMARY ADENOCARCINOMA OF UPPER LOBE OF LEFT LUNG (HCC): ICD-10-CM

## 2021-08-11 DIAGNOSIS — Z17.0 MALIGNANT NEOPLASM OF UPPER-INNER QUADRANT OF LEFT BREAST IN FEMALE, ESTROGEN RECEPTOR POSITIVE (HCC): ICD-10-CM

## 2021-08-11 DIAGNOSIS — C34.12 PRIMARY ADENOCARCINOMA OF UPPER LOBE OF LEFT LUNG (HCC): Primary | ICD-10-CM

## 2021-08-11 DIAGNOSIS — C50.411 MALIGNANT NEOPLASM OF UPPER-OUTER QUADRANT OF RIGHT BREAST IN FEMALE, ESTROGEN RECEPTOR POSITIVE (HCC): ICD-10-CM

## 2021-08-11 DIAGNOSIS — Z17.0 MALIGNANT NEOPLASM OF UPPER-OUTER QUADRANT OF RIGHT BREAST IN FEMALE, ESTROGEN RECEPTOR POSITIVE (HCC): ICD-10-CM

## 2021-08-11 DIAGNOSIS — C50.212 MALIGNANT NEOPLASM OF UPPER-INNER QUADRANT OF LEFT BREAST IN FEMALE, ESTROGEN RECEPTOR POSITIVE (HCC): ICD-10-CM

## 2021-08-11 LAB
ALBUMIN SERPL-MCNC: 4.3 G/DL (ref 3.5–5.2)
ALBUMIN/GLOB SERPL: 1.6 G/DL (ref 1.1–2.4)
ALP SERPL-CCNC: 118 U/L (ref 38–116)
ALT SERPL W P-5'-P-CCNC: 13 U/L (ref 0–33)
ANION GAP SERPL CALCULATED.3IONS-SCNC: 12.4 MMOL/L (ref 5–15)
AST SERPL-CCNC: 18 U/L (ref 0–32)
BASOPHILS # BLD AUTO: 0.09 10*3/MM3 (ref 0–0.2)
BASOPHILS NFR BLD AUTO: 0.8 % (ref 0–1.5)
BILIRUB SERPL-MCNC: <0.2 MG/DL (ref 0.2–1.2)
BUN SERPL-MCNC: 15 MG/DL (ref 6–20)
BUN/CREAT SERPL: 15.2 (ref 7.3–30)
CALCIUM SPEC-SCNC: 9.5 MG/DL (ref 8.5–10.2)
CHLORIDE SERPL-SCNC: 99 MMOL/L (ref 98–107)
CO2 SERPL-SCNC: 24.6 MMOL/L (ref 22–29)
CREAT SERPL-MCNC: 0.99 MG/DL (ref 0.6–1.1)
DEPRECATED RDW RBC AUTO: 64.4 FL (ref 37–54)
EOSINOPHIL # BLD AUTO: 0.16 10*3/MM3 (ref 0–0.4)
EOSINOPHIL NFR BLD AUTO: 1.4 % (ref 0.3–6.2)
ERYTHROCYTE [DISTWIDTH] IN BLOOD BY AUTOMATED COUNT: 20.3 % (ref 12.3–15.4)
GFR SERPL CREATININE-BSD FRML MDRD: 58 ML/MIN/1.73
GLOBULIN UR ELPH-MCNC: 2.7 GM/DL (ref 1.8–3.5)
GLUCOSE SERPL-MCNC: 108 MG/DL (ref 74–124)
HCT VFR BLD AUTO: 37.4 % (ref 34–46.6)
HGB BLD-MCNC: 11.6 G/DL (ref 12–15.9)
IMM GRANULOCYTES # BLD AUTO: 0.15 10*3/MM3 (ref 0–0.05)
IMM GRANULOCYTES NFR BLD AUTO: 1.3 % (ref 0–0.5)
LYMPHOCYTES # BLD AUTO: 1.56 10*3/MM3 (ref 0.7–3.1)
LYMPHOCYTES NFR BLD AUTO: 13.5 % (ref 19.6–45.3)
MCH RBC QN AUTO: 27.5 PG (ref 26.6–33)
MCHC RBC AUTO-ENTMCNC: 31 G/DL (ref 31.5–35.7)
MCV RBC AUTO: 88.6 FL (ref 79–97)
MONOCYTES # BLD AUTO: 1.19 10*3/MM3 (ref 0.1–0.9)
MONOCYTES NFR BLD AUTO: 10.3 % (ref 5–12)
NEUTROPHILS NFR BLD AUTO: 72.7 % (ref 42.7–76)
NEUTROPHILS NFR BLD AUTO: 8.42 10*3/MM3 (ref 1.7–7)
NRBC BLD AUTO-RTO: 0 /100 WBC (ref 0–0.2)
PLATELET # BLD AUTO: 286 10*3/MM3 (ref 140–450)
PMV BLD AUTO: 9.2 FL (ref 6–12)
POTASSIUM SERPL-SCNC: 5.1 MMOL/L (ref 3.5–4.7)
PROT SERPL-MCNC: 7 G/DL (ref 6.3–8)
RBC # BLD AUTO: 4.22 10*6/MM3 (ref 3.77–5.28)
SODIUM SERPL-SCNC: 136 MMOL/L (ref 134–145)
T4 FREE SERPL-MCNC: 1.48 NG/DL (ref 0.93–1.7)
TSH SERPL DL<=0.05 MIU/L-ACNC: 1.09 UIU/ML (ref 0.27–4.2)
WBC # BLD AUTO: 11.57 10*3/MM3 (ref 3.4–10.8)

## 2021-08-11 PROCEDURE — 25010000002 DURVALUMAB 50 MG/ML SOLUTION 10 ML VIAL: Performed by: INTERNAL MEDICINE

## 2021-08-11 PROCEDURE — 84439 ASSAY OF FREE THYROXINE: CPT | Performed by: INTERNAL MEDICINE

## 2021-08-11 PROCEDURE — 99215 OFFICE O/P EST HI 40 MIN: CPT | Performed by: INTERNAL MEDICINE

## 2021-08-11 PROCEDURE — 85025 COMPLETE CBC W/AUTO DIFF WBC: CPT

## 2021-08-11 PROCEDURE — 96413 CHEMO IV INFUSION 1 HR: CPT

## 2021-08-11 PROCEDURE — 80053 COMPREHEN METABOLIC PANEL: CPT

## 2021-08-11 PROCEDURE — 25010000002 DURVALUMAB 50 MG/ML SOLUTION 2.4 ML VIAL: Performed by: INTERNAL MEDICINE

## 2021-08-11 PROCEDURE — 84443 ASSAY THYROID STIM HORMONE: CPT | Performed by: INTERNAL MEDICINE

## 2021-08-11 RX ORDER — SODIUM CHLORIDE 9 MG/ML
250 INJECTION, SOLUTION INTRAVENOUS ONCE
Status: CANCELLED | OUTPATIENT
Start: 2021-08-11

## 2021-08-11 RX ORDER — SODIUM CHLORIDE 9 MG/ML
250 INJECTION, SOLUTION INTRAVENOUS ONCE
Status: COMPLETED | OUTPATIENT
Start: 2021-08-11 | End: 2021-08-11

## 2021-08-11 RX ADMIN — SODIUM CHLORIDE 780 MG: 900 INJECTION, SOLUTION INTRAVENOUS at 13:13

## 2021-08-11 RX ADMIN — SODIUM CHLORIDE 250 ML: 9 INJECTION, SOLUTION INTRAVENOUS at 13:13

## 2021-08-11 NOTE — TELEPHONE ENCOUNTER
Caller: Holli Patel    Relationship to patient: Self    Best call back number: [phone]    Chief complaint: PATIENT WAS IN OFFICE TODAY AND NEEDS TO SCHEDULE HER NEXT INFUSION.      Type of visit: INFUSION     PLEASE CONTACT PATIENT TO ADVISE.  LEAVE VM IF NO ANSWER.

## 2021-08-11 NOTE — PROGRESS NOTES
Subjective   Holli Patel is a 56 y.o. female.  Referred by Dr. 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    3/1/2021-bilateral screening mammogram-microcalcifications seen in the posterior one third of the lateral aspect of the right breast.  Area of focal asymmetry seen in the middle one third of the upper inner quadrant of the left breast.    3/1/2021-DEXA scan-T score of -2.2 on the lumbar spine and T score of -2.3 in the left hip and T score of -1.9 in the right hip.  Findings consistent with osteopenia    3/23/2021-screening lung CT-there is a 10 x 11 mm solid nodule in the left upper lobe.  Enlarged AP window lymph node measuring 14 x 10 mm.  Suggestion of a possible 9 mm left hilar lymph node.  Heavy coronary artery calcification.    3/23/2021-diagnostic mammogram bilateral-cluster of microcalcifications in the middle third lateral aspect of the right breast.  Left breast demonstrates persistence of the area of focal asymmetry in the region.    Ultrasound-left breast ultrasound at 10 o'clock position, 6 cm from the nipple there is a 0.4 cm irregular hypoechoic lesion.  Stereotactic biopsy of the right breast calcifications recommended.  Ultrasound-guided biopsy of the left breast lesion recommended    4/7/2021-right breast stereotactic biopsy and left breast ultrasound-guided biopsy  Pathology  Right breast-invasive ductal carcinoma, grade 2, lymphovascular invasion present, ER +99% strong, KY +80% moderate, HER-2 negative, Ki-67 12%  Left breast upper inner quadrant 10 o'clock position biopsy, invasive ductal carcinoma, grade   2, ER +99% strong, KY +40% strong, HER-2 2+ on immunohistochemistry, nonamplified on FISH Ki-67 10%    4/14/2021-PET/CT-FDG avid aortopulmonary window lymph node measures 0.9 cm with an SUV of 7.5.  FDG avid left hilar lymph node  measures 1 cm with an SUV of 17.2.  Irregular soft tissue density in the right breast measuring 3.7 x 3.8 cm is favored to be secondary to the recent breast biopsy.  1.1 cm pulmonary nodule within the left upper lobe with SUV 9.7 .  Sub-6 mm pulmonary nodules are present in the right lower lobe.  No evidence of lymphadenopathy or metastatic disease in the abdomen.  Focal area of FDG uptake within the central canal posterior to T11-T12 displaced demonstrating an SUV of 5.5 thought to be reactive however MRI is recommended for further evaluation.    She was seen by Dr. Molina who initially planned for breast surgery however  due to the PET abnormalities she has been referred to Dr. Kerr who plans to do a wound on 4/30/2021.  Dr. Molina's and Dr. Kerr's notes reviewed.    Patient denies any family history of breast cancer.  Her mother had lymphoma.  Maternal grandmother had colon cancer.  Prior to that screening mammogram she did not have any palpable abnormalities of either breast.    She has been a heavy smoker for about 40 years and smoked 2 packs/day.  Denies any recent weight changes, new bone pains, cough, abdominal pain nausea vomiting constipation or diarrhea.  She does have anxiety and has been on several medications.  She has recently been started on Xanax to help with the mood and also with insomnia.    Patient had a video-assisted thoracoscopy and mediastinal lymphadenectomy on 4/30/2021.  L5-L6 lymph nodes were biopsied however the small pulmonary nodule in the left upper lobe was not difficult to find.  Pathology showed moderately differentiated adenocarcinoma which is CK7 and TTF-1 positive.  Consistent with lung primary.  Ki-67 85%.    5/14/2021-MRI of the brain-minimal chronic small vessel ischemic change in the white matter.  Otherwise negative MRI.    5/20/2021-bilateral axillary ultrasound-no evidence of axillary lymphadenopathy in either axilla.  Normal-appearing bilateral axillary lymph  nodes visualized.    Cycle 1 cisplatin and Alimta on 5/25/2021.    Cycle 2 cisplatin Alimta 6/15/2021    Cycle 3 cisplatin Alimta 7/7/2021    Completed radiation on 7/12/2021    Port was nonfunctional hence a port study was performed which showed that the port was backed up into the innominate vein and also there was a nonocclusive thrombus at the end of the catheter extending into the superior vena cava.  She was started on anticoagulation with Eliquis.  It was recommended for port revision of the intention is to keep the port.    PET/CT 8/5/2021-images independently reviewed and interpreted by me-decrease in size and FDG uptake of the left upper lobe pulmonary nodule as well as mediastinal and left hilar lymphadenopathy representing response to treatment.  Sub-6 mm pulmonary nodule in the left upper lobe new since 4/14/2021.  This could be related to radiation however follow-up CT in 3 months recommended.  Decrease in size of the irregular masslike tissue in the right breast which is postbiopsy hematoma.  This is not PET avid.  New segmental left eighth rib fractures healing.  A new band of sclerosis of the left seventh rib which favored to represent healing nondisplaced fracture.  Follow-up CT recommended.  Next focal uptake in the duodenum first and second portions.  Could be related to duodenitis.  Indeterminate lesion in the L1 vertebral body not well evaluated on PET/CT.    Interval history  Ms. Patel presents to the clinic today for follow-up accompanied by her .  They are here to discuss the PET/CT results.  She ran out of gabapentin a few days ago and experienced severe cramps in her legs.  Resume gabapentin with which the pain improved.  She still continues to feel fatigued and has some dyspnea on exertion.  She is also noted some altered taste.  No other complaints at this time    The following portions of the patient's history were reviewed and updated as appropriate: allergies, current  medications, past family history, past medical history, past social history, past surgical history and problem list.    Past Medical History:   Diagnosis Date   • Anxiety    • Dyspnea on exertion    • SHAYY (generalized anxiety disorder)     RELATED HIGH BLOOD PRESSURE   • GERD (gastroesophageal reflux disease)    • High blood pressure     ANXIETY RELATED   • Hyperlipidemia    • Hypothyroidism    • Insomnia    • Lung cancer (CMS/HCC)    • Lung nodule    • Malignant neoplasm of upper-outer quadrant of right breast in female, estrogen receptor positive (CMS/HCC) 4/13/2021   • Migraine    • PONV (postoperative nausea and vomiting)         Past Surgical History:   Procedure Laterality Date   • BREAST BIOPSY Bilateral 04/07/2021    Right Breast 10 o'clock & Left breast 10 o'clock 6 cm from nipple, [facility]   • CLOSED REDUCTION HIP DISLOCATION Left 4/30/2021    Procedure: BRONCHOSCOPY, LEFT VIDEO ASSITED THORACOSCOPY, ROBOTIC ASSSITED MEDIASTINAL LYMPHADENECTOMY WITH INTERCOSTAL NERVE BLOCKS;  Surgeon: Raphael Kerr III, MD;  Location: [facility];  Service: DaVinci;  Laterality: Left;   • COLONOSCOPY     • VENOUS ACCESS DEVICE (PORT) INSERTION Right 5/20/2021    Procedure: INSERTION VENOUS ACCESS DEVICE;  Surgeon: Raphael Kerr III, MD;  Location: [facility] OR;  Service: Thoracic;  Laterality: Right;   • WRIST SURGERY Right         Family History   Problem Relation Age of Onset   • Cancer Father         LYMPHATIC   • Alcohol abuse Brother    • Colon cancer Maternal Grandmother    • Malig Hyperthermia Neg Hx         Social History     Socioeconomic History   • Marital status:      Spouse name: Not on file   • Number of children: Not on file   • Years of education: Not on file   • Highest education level: Not on file   Tobacco Use   • Smoking status: Current Every Day Smoker     Packs/day: 1.00     Types: Electronic Cigarette, Cigarettes     Start date: 4/14/1981   • Smokeless tobacco: Never Used   • Tobacco  comment: ABT 15 CIGARETTES DAILY   Vaping Use   • Vaping Use: Never used   Substance and Sexual Activity   • Alcohol use: Never   • Drug use: Never   • Sexual activity: Yes     Partners: Male        OB History        2    Para   2    Term   2            AB        Living           SAB        TAB        Ectopic        Molar        Multiple        Live Births                Age of menarche-10  Age at menopause-42  Oral contraceptive pill use-15 years  No HRT use  She has 2 children  Age at first live childbirth-17    No Known Allergies     Review of Systems   Review of systems as mentioned in the HPI    Objective   Blood pressure 108/69, pulse 96, weight 80.4 kg (177 lb 4.8 oz), SpO2 93 %, not currently breastfeeding.       Physical Exam  Vitals reviewed.   HENT:      Head: Normocephalic.   Eyes:      Extraocular Movements: Extraocular movements intact.      Conjunctiva/sclera: Conjunctivae normal.      Pupils: Pupils are equal, round, and reactive to light.   Cardiovascular:      Rate and Rhythm: Normal rate and regular rhythm.      Pulses: Normal pulses.      Heart sounds: Normal heart sounds.   Pulmonary:      Effort: Pulmonary effort is normal.      Breath sounds: Normal breath sounds.   Abdominal:      General: Abdomen is flat. Bowel sounds are normal. There is distension.      Palpations: There is no mass.   Musculoskeletal:         General: No swelling. Normal range of motion.      Cervical back: Normal range of motion.   Skin:     General: Skin is warm.   Neurological:      General: No focal deficit present.      Mental Status: She is alert and oriented to person, place, and time.   Psychiatric:         Mood and Affect: Mood normal.         Behavior: Behavior normal.     Breast Exam: Right breast with a large postbiopsy hematoma in the upper outer quadrant measuring 5 cm.    No other palpable abnormalities.  Left breast with no palpable abnormalities.  No right or left axillary  lymphadenopathy    I have reexamined the patient and the results are consistent with the previously documented exam. Salma Snell MD         Infusion on 08/11/2021   Component Date Value Ref Range Status   • Glucose 08/11/2021 108  74 - 124 mg/dL Final   • BUN 08/11/2021 15  6 - 20 mg/dL Final   • Creatinine 08/11/2021 0.99  0.60 - 1.10 mg/dL Final   • Sodium 08/11/2021 136  134 - 145 mmol/L Final   • Potassium 08/11/2021 5.1* 3.5 - 4.7 mmol/L Final    Slight hemolysis detected by analyzer. Results may be affected.   • Chloride 08/11/2021 99  98 - 107 mmol/L Final   • CO2 08/11/2021 24.6  22.0 - 29.0 mmol/L Final   • Calcium 08/11/2021 9.5  8.5 - 10.2 mg/dL Final   • Total Protein 08/11/2021 7.0  6.3 - 8.0 g/dL Final   • Albumin 08/11/2021 4.30  3.50 - 5.20 g/dL Final   • ALT (SGPT) 08/11/2021 13  0 - 33 U/L Final   • AST (SGOT) 08/11/2021 18  0 - 32 U/L Final   • Alkaline Phosphatase 08/11/2021 118* 38 - 116 U/L Final   • Total Bilirubin 08/11/2021 <0.2* 0.2 - 1.2 mg/dL Final   • eGFR Non African Amer 08/11/2021 58* >60 mL/min/1.73 Final   • Globulin 08/11/2021 2.7  1.8 - 3.5 gm/dL Final   • A/G Ratio 08/11/2021 1.6  1.1 - 2.4 g/dL Final   • BUN/Creatinine Ratio 08/11/2021 15.2  7.3 - 30.0 Final   • Anion Gap 08/11/2021 12.4  5.0 - 15.0 mmol/L Final   • WBC 08/11/2021 11.57* 3.40 - 10.80 10*3/mm3 Final   • RBC 08/11/2021 4.22  3.77 - 5.28 10*6/mm3 Final   • Hemoglobin 08/11/2021 11.6* 12.0 - 15.9 g/dL Final   • Hematocrit 08/11/2021 37.4  34.0 - 46.6 % Final   • MCV 08/11/2021 88.6  79.0 - 97.0 fL Final   • MCH 08/11/2021 27.5  26.6 - 33.0 pg Final   • MCHC 08/11/2021 31.0* 31.5 - 35.7 g/dL Final   • RDW 08/11/2021 20.3* 12.3 - 15.4 % Final   • RDW-SD 08/11/2021 64.4* 37.0 - 54.0 fl Final   • MPV 08/11/2021 9.2  6.0 - 12.0 fL Final   • Platelets 08/11/2021 286  140 - 450 10*3/mm3 Final   • Neutrophil % 08/11/2021 72.7  42.7 - 76.0 % Final   • Lymphocyte % 08/11/2021 13.5* 19.6 - 45.3 % Final   • Monocyte %  08/11/2021 10.3  5.0 - 12.0 % Final   • Eosinophil % 08/11/2021 1.4  0.3 - 6.2 % Final   • Basophil % 08/11/2021 0.8  0.0 - 1.5 % Final   • Immature Grans % 08/11/2021 1.3* 0.0 - 0.5 % Final   • Neutrophils, Absolute 08/11/2021 8.42* 1.70 - 7.00 10*3/mm3 Final   • Lymphocytes, Absolute 08/11/2021 1.56  0.70 - 3.10 10*3/mm3 Final   • Monocytes, Absolute 08/11/2021 1.19* 0.10 - 0.90 10*3/mm3 Final   • Eosinophils, Absolute 08/11/2021 0.16  0.00 - 0.40 10*3/mm3 Final   • Basophils, Absolute 08/11/2021 0.09  0.00 - 0.20 10*3/mm3 Final   • Immature Grans, Absolute 08/11/2021 0.15* 0.00 - 0.05 10*3/mm3 Final   • nRBC 08/11/2021 0.0  0.0 - 0.2 /100 WBC Final   Hospital Outpatient Visit on 08/05/2021   Component Date Value Ref Range Status   • Glucose 08/05/2021 110  70 - 130 mg/dL Final    Meter: [id number] : [id number] Farrukh Jesusita RTCT   Infusion on 07/27/2021   Component Date Value Ref Range Status   • Glucose 07/27/2021 103  74 - 124 mg/dL Final   • BUN 07/27/2021 13  6 - 20 mg/dL Final   • Creatinine 07/27/2021 0.85  0.60 - 1.10 mg/dL Final   • Sodium 07/27/2021 141  134 - 145 mmol/L Final   • Potassium 07/27/2021 4.4  3.5 - 4.7 mmol/L Final    Slight hemolysis detected by analyzer. Results may be affected.   • Chloride 07/27/2021 104  98 - 107 mmol/L Final   • CO2 07/27/2021 24.9  22.0 - 29.0 mmol/L Final   • Calcium 07/27/2021 9.2  8.5 - 10.2 mg/dL Final   • Total Protein 07/27/2021 6.9  6.3 - 8.0 g/dL Final   • Albumin 07/27/2021 3.90  3.50 - 5.20 g/dL Final   • ALT (SGPT) 07/27/2021 10  0 - 33 U/L Final   • AST (SGOT) 07/27/2021 17  0 - 32 U/L Final    Slight hemolysis detected by analyzer. Results may be affected.   • Alkaline Phosphatase 07/27/2021 122* 38 - 116 U/L Final   • Total Bilirubin 07/27/2021 <0.2* 0.2 - 1.2 mg/dL Final   • eGFR Non  Amer 07/27/2021 69  >60 mL/min/1.73 Final   • Globulin 07/27/2021 3.0  1.8 - 3.5 gm/dL Final   • A/G Ratio 07/27/2021 1.3  1.1 - 2.4 g/dL Final   •  BUN/Creatinine Ratio 07/27/2021 15.3  7.3 - 30.0 Final   • Anion Gap 07/27/2021 12.1  5.0 - 15.0 mmol/L Final   • Magnesium 07/27/2021 1.5* 1.8 - 2.5 mg/dL Final   • WBC 07/27/2021 6.33  3.40 - 10.80 10*3/mm3 Final   • RBC 07/27/2021 3.87  3.77 - 5.28 10*6/mm3 Final   • Hemoglobin 07/27/2021 10.4* 12.0 - 15.9 g/dL Final   • Hematocrit 07/27/2021 34.6  34.0 - 46.6 % Final   • MCV 07/27/2021 89.4  79.0 - 97.0 fL Final   • MCH 07/27/2021 26.9  26.6 - 33.0 pg Final   • MCHC 07/27/2021 30.1* 31.5 - 35.7 g/dL Final   • RDW 07/27/2021 19.2* 12.3 - 15.4 % Final   • RDW-SD 07/27/2021 60.2* 37.0 - 54.0 fl Final   • MPV 07/27/2021 9.0  6.0 - 12.0 fL Final   • Platelets 07/27/2021 391  140 - 450 10*3/mm3 Final   • Neutrophil % 07/27/2021 75.7  42.7 - 76.0 % Final   • Lymphocyte % 07/27/2021 12.3* 19.6 - 45.3 % Final   • Monocyte % 07/27/2021 8.5  5.0 - 12.0 % Final   • Eosinophil % 07/27/2021 1.6  0.3 - 6.2 % Final   • Basophil % 07/27/2021 0.8  0.0 - 1.5 % Final   • Immature Grans % 07/27/2021 1.1* 0.0 - 0.5 % Final   • Neutrophils, Absolute 07/27/2021 4.79  1.70 - 7.00 10*3/mm3 Final   • Lymphocytes, Absolute 07/27/2021 0.78  0.70 - 3.10 10*3/mm3 Final   • Monocytes, Absolute 07/27/2021 0.54  0.10 - 0.90 10*3/mm3 Final   • Eosinophils, Absolute 07/27/2021 0.10  0.00 - 0.40 10*3/mm3 Final   • Basophils, Absolute 07/27/2021 0.05  0.00 - 0.20 10*3/mm3 Final   • Immature Grans, Absolute 07/27/2021 0.07* 0.00 - 0.05 10*3/mm3 Final   • nRBC 07/27/2021 0.0  0.0 - 0.2 /100 WBC Final   Lab on 07/19/2021   Component Date Value Ref Range Status   • Glucose 07/19/2021 137* 74 - 124 mg/dL Final   • BUN 07/19/2021 15  6 - 20 mg/dL Final   • Creatinine 07/19/2021 1.00  0.60 - 1.10 mg/dL Final   • Sodium 07/19/2021 141  134 - 145 mmol/L Final   • Potassium 07/19/2021 3.7  3.5 - 4.7 mmol/L Final   • Chloride 07/19/2021 102  98 - 107 mmol/L Final   • CO2 07/19/2021 28.5  22.0 - 29.0 mmol/L Final   • Calcium 07/19/2021 9.2  8.5 - 10.2 mg/dL  Final   • eGFR Non  Amer 07/19/2021 57* >60 mL/min/1.73 Final   • BUN/Creatinine Ratio 07/19/2021 15.0  7.3 - 30.0 Final   • Anion Gap 07/19/2021 10.5  5.0 - 15.0 mmol/L Final   • WBC 07/19/2021 5.78  3.40 - 10.80 10*3/mm3 Final   • RBC 07/19/2021 3.75* 3.77 - 5.28 10*6/mm3 Final   • Hemoglobin 07/19/2021 10.0* 12.0 - 15.9 g/dL Final   • Hematocrit 07/19/2021 32.7* 34.0 - 46.6 % Final   • MCV 07/19/2021 87.2  79.0 - 97.0 fL Final   • MCH 07/19/2021 26.7  26.6 - 33.0 pg Final   • MCHC 07/19/2021 30.6* 31.5 - 35.7 g/dL Final   • RDW 07/19/2021 16.1* 12.3 - 15.4 % Final   • RDW-SD 07/19/2021 49.4  37.0 - 54.0 fl Final   • MPV 07/19/2021 9.1  6.0 - 12.0 fL Final   • Platelets 07/19/2021 153  140 - 450 10*3/mm3 Final   • Neutrophil % 07/19/2021 74.6  42.7 - 76.0 % Final   • Lymphocyte % 07/19/2021 16.1* 19.6 - 45.3 % Final   • Monocyte % 07/19/2021 7.6  5.0 - 12.0 % Final   • Eosinophil % 07/19/2021 0.5  0.3 - 6.2 % Final   • Basophil % 07/19/2021 0.5  0.0 - 1.5 % Final   • Immature Grans % 07/19/2021 0.7* 0.0 - 0.5 % Final   • Neutrophils, Absolute 07/19/2021 4.31  1.70 - 7.00 10*3/mm3 Final   • Lymphocytes, Absolute 07/19/2021 0.93  0.70 - 3.10 10*3/mm3 Final   • Monocytes, Absolute 07/19/2021 0.44  0.10 - 0.90 10*3/mm3 Final   • Eosinophils, Absolute 07/19/2021 0.03  0.00 - 0.40 10*3/mm3 Final   • Basophils, Absolute 07/19/2021 0.03  0.00 - 0.20 10*3/mm3 Final   • Immature Grans, Absolute 07/19/2021 0.04  0.00 - 0.05 10*3/mm3 Final   • nRBC 07/19/2021 0.0  0.0 - 0.2 /100 WBC Final   Infusion on 07/13/2021   Component Date Value Ref Range Status   • Glucose 07/13/2021 137* 74 - 124 mg/dL Final   • BUN 07/13/2021 21* 6 - 20 mg/dL Final   • Creatinine 07/13/2021 1.28* 0.60 - 1.10 mg/dL Final   • Sodium 07/13/2021 138  134 - 145 mmol/L Final   • Potassium 07/13/2021 3.5  3.5 - 4.7 mmol/L Final   • Chloride 07/13/2021 99  98 - 107 mmol/L Final   • CO2 07/13/2021 25.2  22.0 - 29.0 mmol/L Final   • Calcium 07/13/2021  9.0  8.5 - 10.2 mg/dL Final   • Total Protein 07/13/2021 6.9  6.3 - 8.0 g/dL Final   • Albumin 07/13/2021 3.80  3.50 - 5.20 g/dL Final   • ALT (SGPT) 07/13/2021 13  0 - 33 U/L Final   • AST (SGOT) 07/13/2021 13  0 - 32 U/L Final   • Alkaline Phosphatase 07/13/2021 98  38 - 116 U/L Final   • Total Bilirubin 07/13/2021 0.3  0.2 - 1.2 mg/dL Final   • eGFR Non African Amer 07/13/2021 43* >60 mL/min/1.73 Final   • Globulin 07/13/2021 3.1  1.8 - 3.5 gm/dL Final   • A/G Ratio 07/13/2021 1.2  1.1 - 2.4 g/dL Final   • BUN/Creatinine Ratio 07/13/2021 16.4  7.3 - 30.0 Final   • Anion Gap 07/13/2021 13.8  5.0 - 15.0 mmol/L Final   • WBC 07/13/2021 3.12* 3.40 - 10.80 10*3/mm3 Final   • RBC 07/13/2021 4.51  3.77 - 5.28 10*6/mm3 Final   • Hemoglobin 07/13/2021 12.1  12.0 - 15.9 g/dL Final   • Hematocrit 07/13/2021 38.5  34.0 - 46.6 % Final   • MCV 07/13/2021 85.4  79.0 - 97.0 fL Final   • MCH 07/13/2021 26.8  26.6 - 33.0 pg Final   • MCHC 07/13/2021 31.4* 31.5 - 35.7 g/dL Final   • RDW 07/13/2021 15.9* 12.3 - 15.4 % Final   • RDW-SD 07/13/2021 48.7  37.0 - 54.0 fl Final   • MPV 07/13/2021 9.4  6.0 - 12.0 fL Final   • Platelets 07/13/2021 119* 140 - 450 10*3/mm3 Final   • Neutrophil % 07/13/2021 62.8  42.7 - 76.0 % Final   • Lymphocyte % 07/13/2021 15.1* 19.6 - 45.3 % Final   • Monocyte % 07/13/2021 18.6* 5.0 - 12.0 % Final   • Eosinophil % 07/13/2021 1.6  0.3 - 6.2 % Final   • Basophil % 07/13/2021 0.6  0.0 - 1.5 % Final   • Immature Grans % 07/13/2021 1.3* 0.0 - 0.5 % Final   • Neutrophils, Absolute 07/13/2021 1.96  1.70 - 7.00 10*3/mm3 Final   • Lymphocytes, Absolute 07/13/2021 0.47* 0.70 - 3.10 10*3/mm3 Final   • Monocytes, Absolute 07/13/2021 0.58  0.10 - 0.90 10*3/mm3 Final   • Eosinophils, Absolute 07/13/2021 0.05  0.00 - 0.40 10*3/mm3 Final   • Basophils, Absolute 07/13/2021 0.02  0.00 - 0.20 10*3/mm3 Final   • Immature Grans, Absolute 07/13/2021 0.04  0.00 - 0.05 10*3/mm3 Final   • nRBC 07/13/2021 0.0  0.0 - 0.2 /100 WBC  Final        IR Port Study Including Fluoro    Result Date: 7/19/2021  /IMPRESSION: Right subclavian port port. Catheter has backed up with tip in the proximal right innominate vein. Suspected fibrin sheath. Additionally, nonocclusive filling defect in the right innominate vein distal to catheter tip somewhat extending into proximal SVC is suggestive of thrombus. Findings related to Sherron Saldaña APRN at 1208 hours 07/19/2021.  This report was finalized on 7/19/2021 12:10 PM by Dr. Russell Aldana M.D.      NM Pet Skull Base To Mid Thigh    Result Date: 8/6/2021  1.  Interval decrease in size and/or FDG uptake of the left upper lobe pulmonary nodule and mediastinal left hilar adenopathy representing patient's known malignancy and likely metastatic disease. Continued attention on follow-up of these areas is recommended to as residual malignancy cannot be excluded. 2.  Sub-6 mm pulmonary nodule within the left upper lobe is new since 04/14/2021 and below PET resolution. Sub-6 mm pulmonary nodules within the right lower lobe are grossly stable. Findings are indeterminate and continued close attention on follow-up with chest CT in 3 months is recommended. 3.  Continued decrease in size of the irregular masslike soft tissue and fat density within the right breast at least in part representing post biopsy changes. The sterility of this collection cannot be determined based on imaging and correlation with patient history and physical exam is recommended to exclude developing abscess. The known bilateral breast malignancies are not well visualized on PET/CT. 4.  New segmental left eighth rib fractures which are incompletely united and healing. A new band of sclerosis projects through the left seventh rib without significant FDG uptake and while findings in this area are favored to represent a healing nondisplaced fracture, attention on above-mentioned chest CT is recommended to ensure appropriate evolution and exclude the  small possibility of metastatic disease. 5.  Somewhat focal area of FDG uptake within the first and second portions of the duodenum. Findings can be seen in setting of duodenitis in the appropriate clinical context and correlation with patient history is recommended with follow-up endoscopy if clinically indicated. 6.  The indeterminate lesion seen within the L1 vertebral body on prior lumbar spine MRI is not well evaluated on PET/CT and please refer to lumbar spine MRI 05/27/2021 for follow-up recommendations. 7.  Other findings as above.  This report was finalized on 8/6/2021 8:06 AM by Dr. Serjio Lorenz M.D.       8/7/2021  CBC-WBC 11.57, hemoglobin 9.6, platelets 286  CMP within normal limits    Assessment/Plan      *Bilateral breast cancer  · Invasive ductal carcinoma in both right and left breast and lesions measure under 1 cm.  No palpable lymphadenopathy although unsure if this has been evaluated by an axillary ultrasound.  No abnormal axillary lymphadenopathy noted on PET.  · Most likely clinical T1b N0 M0, both cancers were noted to be grade 2, ER/NM positive and HER-2 negative and Ki-67 less than 15%  VATS on 4/30/2021  Biopsy confirms adenocarcinoma of pulmonary primary  Discussed with Dr. Molina about delaying breast surgery and she is in agreement  We discussed starting neoadjuvant endocrine therapy with anastrozole 1 mg p.o. daily  · Patient experienced significant adverse effects with anastrozole  · Treatment changed to letrozole   · Axillary ultrasound 4/20/2021 - for any axillary lymphadenopathy  · Started letrozole in May 2021  · She is tolerating letrozole fairly well.  · Reports being compliant with the same  · The right breast hematoma is relatively unchanged at 5 cm.  No other palpable abnormalities of either breast.  · She has been compliant with the letrozole.  · Continue the same.  · She continues to have a fairly large sized hematoma in the right breast.    · Mammogram and ultrasound  ordered today however due to insurance issues she may not be able to get it in the next 2 weeks.  I told the patient to call the clinic to schedule the mammogram and the ultrasound once insurance figured out.  · She will subsequently go back to see Dr. Molina for definitive surgery based on imaging result    *Adenocarcinoma of the left lung  · Most likely stage T1 N2 M0, stage III  · MRI of the thoracic spine ordered to further evaluate the abnormality seen on PET  · MRI of the brain negative   · Given that she is only 55 and fairly good performance status I discussed with her by with concurrent chemoradiation with cisplatin and Alimta every 3 weeks followed by durvalumab.  · Adverse effects including but not limited to myelosuppression, increased risk of infections, nausea, vomiting, renal dysfunction, ototoxicity, neurotoxicity have been discussed at length.  · MRI of the thoracic spine performed 5/24/2021 shows no evidence of metastatic disease in the thoracic spine however in L1 there is a 7 mm lesion which is indeterminate.  A lumbar spine MRI has been recommended.  · Discussed her case with Dr. Oates and he feels like MRI of the lumbar spine may also show that this lesion is indeterminate.  · Discussed the same with the patient and her .  · Even if this is metastatic disease this might be the only lesion of metastatic disease and then would consider this oligometastatic disease.  · Therefore plan to proceed with concurrent chemoradiation as scheduled.  · cisplatin and Alimta, C1D1 5/25/2021  · Day 1 of radiation 5/25/2021  · 5/27/2021-MRI of the lumbar spine-there is a 6 x 5 x 6 mm enhancing lesion in the left posterior body of L1 vertebra.  This is considered indeterminate.  Follow-up recommended.  Degenerative changes noted at L5-S1  · 7/7/2021, cycle 3 cisplatin Alimta  · 7/12/2021-radiation completed  · PET/CT 8/6/2021 with good response to chemoradiation.  · We will continue with  durvalumab.    *Lumbar spine lesion-L1 lesion indeterminate.  MRI of the lumbar spine spine images independently reviewed and interpreted by me in detail summarized above.  Plan to repeat MRI in 4 months.  This will be due in September 2021  · Order MRI at next visit    *Bilateral breast cancer-no family history of breast cancer but given synchronous breast cancer genetic testing has been sent.  Genetic testing with a variant of unknown significance.    *Anxiety-she is on several different medications currently.  Referral has been made to supportive oncology clinic today.  She is requesting an increase in the dose of the Xanax.  Okay to increase the dose to 1 mg nightly to help with sleep.  Further management of these medications will be deferred to supportive oncology once she has established care with them.  He has been started on gabapentin.  Continue follow-up with supportive oncology  Improved    *Left-sided chest wall pain-secondary to VATS.  Most likely neuropathic.  Continue gabapentin and Norco as needed.  She ran out of gabapentin and experienced leg cramps.  Resume gabapentin and this is now well controlled.    *Abdominal bloating-this has improved.  Continue senna S and MiraLAX as needed    *Hypertension-continue lisinopril.  Blood pressure 108/69    *Smoking-she has not smoked in the past 2 days.  Trying to quit smoking.  Congratulated on her efforts.  Not addressed today    *GERD  · 7/7/2021, patient complains of reflux despite the use of Prilosec 20 mg twice daily.  We will send her a prescription for Carafate slurry 4 times daily.  · She has not quite started using the Carafate.  · Continue Prilosec and recommend using Carafate  · PET/CT shows duodenitis      *Port not returning blood  · Patient reports the last treatment that difficulty getting blood return.  Today nursing was unable to get blood return and have placed Activase without success.  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  The end of the catheter has backed up into the innominate vein.  This was discussed with Dr. Snell and there is no need for additional imaging at this time.  WE WILL NOT USE THE PORT.   · She has been started on Eliquis.  Completed a week of 10 mg twice daily and now on 5 mg.  Continue the same  · We will obtain imaging to see if the disease is responded.  If patient wishes to get the port out then we will continue with anticoagulation for 3 months and remove the port.  However if she wishes to keep the port then a port revision will have to be performed.    · Continue Eliquis and remove port after 3 months    PLAN:  1. Start durvalumab  2. Continue Eliquis 5 mg twice daily  3. Bilateral mammogram and ultrasound prior to follow-up, provided she has insurance coverage  4. Continue letrozole 2.5 mg daily.  5. Continue omeprazole 20mg twice daily.  6. Continue Carafate 4 times daily.   7. Follow-up in 2 weeks    The patient is on high risk medication that requires close monitoring for toxicity.  Multiple issues addressed today including anticoagulation, treatment for lung cancer and discussion of durvalumab, management of bilateral breast cancer, PET/CT results discussed.  45 minutes spent on the encounter including documentation of the same day.    Salma Snell MD

## 2021-08-11 NOTE — TELEPHONE ENCOUNTER
Rx Refill Note  Requested Prescriptions     Pending Prescriptions Disp Refills   • rizatriptan (MAXALT) 10 MG tablet [Pharmacy Med Name: RIZATRIPTAN 10 MG TABLET] 12 tablet 0     Sig: TAKE ONE TABLET BY MOUTH AT ONSET OF HEADACHE; MAY REPEAT ONE TABLET IN 2 HOURS IF NEEDED.      Last office visit with prescribing clinician: 8/3/2021      Next office visit with prescribing clinician: 11/16/2021            North Young MA  08/11/21, 08:04 EDT

## 2021-08-12 RX ORDER — RIZATRIPTAN BENZOATE 10 MG/1
TABLET ORAL
Qty: 12 TABLET | Refills: 0 | Status: SHIPPED | OUTPATIENT
Start: 2021-08-12 | End: 2021-09-05

## 2021-08-16 RX ORDER — PROCHLORPERAZINE MALEATE 10 MG
TABLET ORAL
Qty: 30 TABLET | Refills: 2 | Status: SHIPPED | OUTPATIENT
Start: 2021-08-16 | End: 2021-11-02

## 2021-08-17 DIAGNOSIS — F41.9 ANXIETY: ICD-10-CM

## 2021-08-17 NOTE — TELEPHONE ENCOUNTER
Rx Refill Note  Requested Prescriptions     Pending Prescriptions Disp Refills   • ALPRAZolam (XANAX) 0.5 MG tablet 60 tablet 2     Sig: Take 1 tablet by mouth 2 (Two) Times a Day As Needed for Anxiety. for anxiety      Last office visit with prescribing clinician: 8/3/2021      Next office visit with prescribing clinician: 11/16/2021            North Young MA  08/17/21, 16:54 EDTNew directions should be BID??

## 2021-08-17 NOTE — TELEPHONE ENCOUNTER
Caller: Holli Patel    Relationship: Self    Best call back number: [phone]     Medication needed:   Requested Prescriptions     Pending Prescriptions Disp Refills   • ALPRAZolam (XANAX) 0.5 MG tablet 45 tablet 2     Sig: Take 1 tablet by mouth 2 (Two) Times a Day As Needed for Anxiety. for anxiety       When do you need the refill by: ASAP    What additional details did the patient provide when requesting the medication: PATIENT WAS TOLD BY THE PHARMACY THAT THEY HAVE NOT HEARD BACK FROM THE OFFICE YET. PATIENT ONLY HAS ONE PILL LEFT.     Does the patient have less than a 3 day supply:  [x] Yes  [] No    What is the patient's preferred pharmacy: [address] [facility] AT [facility] [address] [facility] [phone] [facility] [phone] FX

## 2021-08-19 RX ORDER — ALPRAZOLAM 0.5 MG/1
0.5 TABLET ORAL 2 TIMES DAILY PRN
Qty: 45 TABLET | Refills: 2 | Status: SHIPPED | OUTPATIENT
Start: 2021-08-21 | End: 2021-11-04

## 2021-08-23 NOTE — TELEPHONE ENCOUNTER
Eliquis refill request rec electronically from [facility]. The sig on the rx was for the starter pack. Per last office note from dr Snell-pt is to continue 5 mg BID.    1. Continue Eliquis 5 mg twice daily    I have routed the rx to Dr Snell for signature. Once signed it will be escribed to [facility].

## 2021-08-24 ENCOUNTER — HOSPITAL ENCOUNTER (OUTPATIENT)
Dept: MAMMOGRAPHY | Facility: HOSPITAL | Age: 56
Discharge: HOME | End: 2021-08-24

## 2021-08-24 ENCOUNTER — HOSPITAL ENCOUNTER (OUTPATIENT)
Dept: ULTRASOUND IMAGING | Facility: HOSPITAL | Age: 56
Discharge: HOME | End: 2021-08-24

## 2021-08-24 DIAGNOSIS — Z17.0 MALIGNANT NEOPLASM OF UPPER-INNER QUADRANT OF LEFT BREAST IN FEMALE, ESTROGEN RECEPTOR POSITIVE (HCC): ICD-10-CM

## 2021-08-24 DIAGNOSIS — C50.212 MALIGNANT NEOPLASM OF UPPER-INNER QUADRANT OF LEFT BREAST IN FEMALE, ESTROGEN RECEPTOR POSITIVE (HCC): ICD-10-CM

## 2021-08-24 DIAGNOSIS — Z17.0 MALIGNANT NEOPLASM OF UPPER-OUTER QUADRANT OF RIGHT BREAST IN FEMALE, ESTROGEN RECEPTOR POSITIVE (HCC): ICD-10-CM

## 2021-08-24 DIAGNOSIS — C50.411 MALIGNANT NEOPLASM OF UPPER-OUTER QUADRANT OF RIGHT BREAST IN FEMALE, ESTROGEN RECEPTOR POSITIVE (HCC): ICD-10-CM

## 2021-08-24 PROCEDURE — 77065 DX MAMMO INCL CAD UNI: CPT

## 2021-08-24 PROCEDURE — G0279 TOMOSYNTHESIS, MAMMO: HCPCS

## 2021-08-24 PROCEDURE — 77066 DX MAMMO INCL CAD BI: CPT

## 2021-08-24 PROCEDURE — 76642 ULTRASOUND BREAST LIMITED: CPT

## 2021-08-25 ENCOUNTER — APPOINTMENT (OUTPATIENT)
Dept: ONCOLOGY | Facility: HOSPITAL | Age: 56
End: 2021-08-25

## 2021-08-25 ENCOUNTER — INFUSION (OUTPATIENT)
Dept: ONCOLOGY | Facility: HOSPITAL | Age: 56
End: 2021-08-25

## 2021-08-25 ENCOUNTER — OFFICE VISIT (OUTPATIENT)
Dept: ONCOLOGY | Facility: CLINIC | Age: 56
End: 2021-08-25

## 2021-08-25 VITALS
DIASTOLIC BLOOD PRESSURE: 91 MMHG | TEMPERATURE: 97.1 F | RESPIRATION RATE: 18 BRPM | OXYGEN SATURATION: 95 % | HEIGHT: 63 IN | HEART RATE: 107 BPM | SYSTOLIC BLOOD PRESSURE: 143 MMHG | WEIGHT: 183.2 LBS | BODY MASS INDEX: 32.46 KG/M2

## 2021-08-25 DIAGNOSIS — C50.212 MALIGNANT NEOPLASM OF UPPER-INNER QUADRANT OF LEFT BREAST IN FEMALE, ESTROGEN RECEPTOR POSITIVE (HCC): ICD-10-CM

## 2021-08-25 DIAGNOSIS — C50.411 MALIGNANT NEOPLASM OF UPPER-OUTER QUADRANT OF RIGHT BREAST IN FEMALE, ESTROGEN RECEPTOR POSITIVE (HCC): Primary | ICD-10-CM

## 2021-08-25 DIAGNOSIS — G56.00 CARPAL TUNNEL SYNDROME, UNSPECIFIED LATERALITY: ICD-10-CM

## 2021-08-25 DIAGNOSIS — C34.12 PRIMARY ADENOCARCINOMA OF UPPER LOBE OF LEFT LUNG (HCC): ICD-10-CM

## 2021-08-25 DIAGNOSIS — R59.0 MEDIASTINAL ADENOPATHY: ICD-10-CM

## 2021-08-25 DIAGNOSIS — Z17.0 MALIGNANT NEOPLASM OF UPPER-INNER QUADRANT OF LEFT BREAST IN FEMALE, ESTROGEN RECEPTOR POSITIVE (HCC): ICD-10-CM

## 2021-08-25 DIAGNOSIS — C34.12 PRIMARY ADENOCARCINOMA OF UPPER LOBE OF LEFT LUNG (HCC): Primary | ICD-10-CM

## 2021-08-25 DIAGNOSIS — Z17.0 MALIGNANT NEOPLASM OF UPPER-OUTER QUADRANT OF RIGHT BREAST IN FEMALE, ESTROGEN RECEPTOR POSITIVE (HCC): Primary | ICD-10-CM

## 2021-08-25 LAB
ALBUMIN SERPL-MCNC: 4.2 G/DL (ref 3.5–5.2)
ALBUMIN/GLOB SERPL: 1.7 G/DL (ref 1.1–2.4)
ALP SERPL-CCNC: 99 U/L (ref 38–116)
ALT SERPL W P-5'-P-CCNC: 11 U/L (ref 0–33)
ANION GAP SERPL CALCULATED.3IONS-SCNC: 10.2 MMOL/L (ref 5–15)
AST SERPL-CCNC: 16 U/L (ref 0–32)
BASOPHILS # BLD AUTO: 0.07 10*3/MM3 (ref 0–0.2)
BASOPHILS NFR BLD AUTO: 0.9 % (ref 0–1.5)
BILIRUB SERPL-MCNC: <0.2 MG/DL (ref 0.2–1.2)
BUN SERPL-MCNC: 18 MG/DL (ref 6–20)
BUN/CREAT SERPL: 15.7 (ref 7.3–30)
CALCIUM SPEC-SCNC: 9.4 MG/DL (ref 8.5–10.2)
CHLORIDE SERPL-SCNC: 102 MMOL/L (ref 98–107)
CO2 SERPL-SCNC: 25.8 MMOL/L (ref 22–29)
CREAT SERPL-MCNC: 1.15 MG/DL (ref 0.6–1.1)
DEPRECATED RDW RBC AUTO: 67.4 FL (ref 37–54)
EOSINOPHIL # BLD AUTO: 0.5 10*3/MM3 (ref 0–0.4)
EOSINOPHIL NFR BLD AUTO: 6.4 % (ref 0.3–6.2)
ERYTHROCYTE [DISTWIDTH] IN BLOOD BY AUTOMATED COUNT: 20.2 % (ref 12.3–15.4)
GFR SERPL CREATININE-BSD FRML MDRD: 48 ML/MIN/1.73
GLOBULIN UR ELPH-MCNC: 2.5 GM/DL (ref 1.8–3.5)
GLUCOSE SERPL-MCNC: 115 MG/DL (ref 74–124)
HCT VFR BLD AUTO: 35.6 % (ref 34–46.6)
HGB BLD-MCNC: 10.8 G/DL (ref 12–15.9)
IMM GRANULOCYTES # BLD AUTO: 0.07 10*3/MM3 (ref 0–0.05)
IMM GRANULOCYTES NFR BLD AUTO: 0.9 % (ref 0–0.5)
LYMPHOCYTES # BLD AUTO: 1.34 10*3/MM3 (ref 0.7–3.1)
LYMPHOCYTES NFR BLD AUTO: 17.3 % (ref 19.6–45.3)
MCH RBC QN AUTO: 28.3 PG (ref 26.6–33)
MCHC RBC AUTO-ENTMCNC: 30.3 G/DL (ref 31.5–35.7)
MCV RBC AUTO: 93.2 FL (ref 79–97)
MONOCYTES # BLD AUTO: 0.9 10*3/MM3 (ref 0.1–0.9)
MONOCYTES NFR BLD AUTO: 11.6 % (ref 5–12)
NEUTROPHILS NFR BLD AUTO: 4.88 10*3/MM3 (ref 1.7–7)
NEUTROPHILS NFR BLD AUTO: 62.9 % (ref 42.7–76)
NRBC BLD AUTO-RTO: 0 /100 WBC (ref 0–0.2)
PLATELET # BLD AUTO: 255 10*3/MM3 (ref 140–450)
PMV BLD AUTO: 9.1 FL (ref 6–12)
POTASSIUM SERPL-SCNC: 4.8 MMOL/L (ref 3.5–4.7)
PROT SERPL-MCNC: 6.7 G/DL (ref 6.3–8)
RBC # BLD AUTO: 3.82 10*6/MM3 (ref 3.77–5.28)
SODIUM SERPL-SCNC: 138 MMOL/L (ref 134–145)
WBC # BLD AUTO: 7.76 10*3/MM3 (ref 3.4–10.8)

## 2021-08-25 PROCEDURE — 85025 COMPLETE CBC W/AUTO DIFF WBC: CPT

## 2021-08-25 PROCEDURE — 25010000002 DURVALUMAB 50 MG/ML SOLUTION 10 ML VIAL: Performed by: INTERNAL MEDICINE

## 2021-08-25 PROCEDURE — 25010000002 DURVALUMAB 50 MG/ML SOLUTION 2.4 ML VIAL: Performed by: INTERNAL MEDICINE

## 2021-08-25 PROCEDURE — 36591 DRAW BLOOD OFF VENOUS DEVICE: CPT

## 2021-08-25 PROCEDURE — 96413 CHEMO IV INFUSION 1 HR: CPT

## 2021-08-25 PROCEDURE — 99215 OFFICE O/P EST HI 40 MIN: CPT | Performed by: INTERNAL MEDICINE

## 2021-08-25 PROCEDURE — 80053 COMPREHEN METABOLIC PANEL: CPT

## 2021-08-25 RX ORDER — GABAPENTIN 300 MG/1
600 CAPSULE ORAL 3 TIMES DAILY
Qty: 180 CAPSULE | Refills: 0 | Status: SHIPPED | OUTPATIENT
Start: 2021-08-25 | End: 2021-10-11 | Stop reason: SDUPTHER

## 2021-08-25 RX ORDER — SODIUM CHLORIDE 9 MG/ML
250 INJECTION, SOLUTION INTRAVENOUS ONCE
Status: CANCELLED | OUTPATIENT
Start: 2021-08-25

## 2021-08-25 RX ORDER — SODIUM CHLORIDE 9 MG/ML
250 INJECTION, SOLUTION INTRAVENOUS ONCE
Status: COMPLETED | OUTPATIENT
Start: 2021-08-25 | End: 2021-08-25

## 2021-08-25 RX ADMIN — SODIUM CHLORIDE 780 MG: 900 INJECTION, SOLUTION INTRAVENOUS at 11:56

## 2021-08-25 RX ADMIN — SODIUM CHLORIDE 250 ML: 9 INJECTION, SOLUTION INTRAVENOUS at 11:56

## 2021-08-25 NOTE — PROGRESS NOTES
Subjective   Holli Patel is a 56 y.o. female.  Referred by Dr. 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    3/1/2021-bilateral screening mammogram-microcalcifications seen in the posterior one third of the lateral aspect of the right breast.  Area of focal asymmetry seen in the middle one third of the upper inner quadrant of the left breast.    3/1/2021-DEXA scan-T score of -2.2 on the lumbar spine and T score of -2.3 in the left hip and T score of -1.9 in the right hip.  Findings consistent with osteopenia    3/23/2021-screening lung CT-there is a 10 x 11 mm solid nodule in the left upper lobe.  Enlarged AP window lymph node measuring 14 x 10 mm.  Suggestion of a possible 9 mm left hilar lymph node.  Heavy coronary artery calcification.    3/23/2021-diagnostic mammogram bilateral-cluster of microcalcifications in the middle third lateral aspect of the right breast.  Left breast demonstrates persistence of the area of focal asymmetry in the region.    Ultrasound-left breast ultrasound at 10 o'clock position, 6 cm from the nipple there is a 0.4 cm irregular hypoechoic lesion.  Stereotactic biopsy of the right breast calcifications recommended.  Ultrasound-guided biopsy of the left breast lesion recommended    4/7/2021-right breast stereotactic biopsy and left breast ultrasound-guided biopsy  Pathology  Right breast-invasive ductal carcinoma, grade 2, lymphovascular invasion present, ER +99% strong, GA +80% moderate, HER-2 negative, Ki-67 12%  Left breast upper inner quadrant 10 o'clock position biopsy, invasive ductal carcinoma, grade   2, ER +99% strong, GA +40% strong, HER-2 2+ on immunohistochemistry, nonamplified on FISH Ki-67 10%    4/14/2021-PET/CT-FDG avid aortopulmonary window lymph node measures 0.9 cm with an SUV of 7.5.  FDG avid left hilar lymph node  measures 1 cm with an SUV of 17.2.  Irregular soft tissue density in the right breast measuring 3.7 x 3.8 cm is favored to be secondary to the recent breast biopsy.  1.1 cm pulmonary nodule within the left upper lobe with SUV 9.7 .  Sub-6 mm pulmonary nodules are present in the right lower lobe.  No evidence of lymphadenopathy or metastatic disease in the abdomen.  Focal area of FDG uptake within the central canal posterior to T11-T12 displaced demonstrating an SUV of 5.5 thought to be reactive however MRI is recommended for further evaluation.    She was seen by Dr. Molina who initially planned for breast surgery however  due to the PET abnormalities she has been referred to Dr. Kerr who plans to do a wound on 4/30/2021.  Dr. Molina's and Dr. Kerr's notes reviewed.    Patient denies any family history of breast cancer.  Her mother had lymphoma.  Maternal grandmother had colon cancer.  Prior to that screening mammogram she did not have any palpable abnormalities of either breast.    She has been a heavy smoker for about 40 years and smoked 2 packs/day.  Denies any recent weight changes, new bone pains, cough, abdominal pain nausea vomiting constipation or diarrhea.  She does have anxiety and has been on several medications.  She has recently been started on Xanax to help with the mood and also with insomnia.    Patient had a video-assisted thoracoscopy and mediastinal lymphadenectomy on 4/30/2021.  L5-L6 lymph nodes were biopsied however the small pulmonary nodule in the left upper lobe was not difficult to find.  Pathology showed moderately differentiated adenocarcinoma which is CK7 and TTF-1 positive.  Consistent with lung primary.  Ki-67 85%.    5/14/2021-MRI of the brain-minimal chronic small vessel ischemic change in the white matter.  Otherwise negative MRI.    5/20/2021-bilateral axillary ultrasound-no evidence of axillary lymphadenopathy in either axilla.  Normal-appearing bilateral axillary lymph  nodes visualized.    Cycle 1 cisplatin and Alimta on 5/25/2021.    Cycle 2 cisplatin Alimta 6/15/2021    Cycle 3 cisplatin Alimta 7/7/2021    Completed radiation on 7/12/2021    Port was nonfunctional hence a port study was performed which showed that the port was backed up into the innominate vein and also there was a nonocclusive thrombus at the end of the catheter extending into the superior vena cava.  She was started on anticoagulation with Eliquis.  It was recommended for port revision of the intention is to keep the port.    PET/CT 8/5/2021-images independently reviewed and interpreted by me-decrease in size and FDG uptake of the left upper lobe pulmonary nodule as well as mediastinal and left hilar lymphadenopathy representing response to treatment.  Sub-6 mm pulmonary nodule in the left upper lobe new since 4/14/2021.  This could be related to radiation however follow-up CT in 3 months recommended.  Decrease in size of the irregular masslike tissue in the right breast which is postbiopsy hematoma.  This is not PET avid.  New segmental left eighth rib fractures healing.  A new band of sclerosis of the left seventh rib which favored to represent healing nondisplaced fracture.  Follow-up CT recommended.  Next focal uptake in the duodenum first and second portions.  Could be related to duodenitis.  Indeterminate lesion in the L1 vertebral body not well evaluated on PET/CT.    Interval history  Ms. Patel presents to the clinic today for follow-up accompanied by her .  She is complaining of numbness and difficulty grasping objects with the right hand.  This started about 2 weeks ago.  She currently takes gabapentin 300 mg 3 times daily.  On the right upper extremity she previously had a carpal tunnel repair and had extensive surgery.  Denies any new neck pain or headaches.  She has tolerated the durvalumab fairly well.  She had a mammogram and an ultrasound of both breasts and here to review the results  of the same    The following portions of the patient's history were reviewed and updated as appropriate: allergies, current medications, past family history, past medical history, past social history, past surgical history and problem list.    Past Medical History:   Diagnosis Date   • Anxiety    • Dyspnea on exertion    • SHAYY (generalized anxiety disorder)     RELATED HIGH BLOOD PRESSURE   • GERD (gastroesophageal reflux disease)    • High blood pressure     ANXIETY RELATED   • Hyperlipidemia    • Hypothyroidism    • Insomnia    • Lung cancer (CMS/HCC)    • Lung nodule    • Malignant neoplasm of upper-outer quadrant of right breast in female, estrogen receptor positive (CMS/HCC) 4/13/2021   • Migraine    • PONV (postoperative nausea and vomiting)         Past Surgical History:   Procedure Laterality Date   • BREAST BIOPSY Bilateral 04/07/2021    Right Breast 10 o'clock & Left breast 10 o'clock 6 cm from nipple, [facility]   • CLOSED REDUCTION HIP DISLOCATION Left 4/30/2021    Procedure: BRONCHOSCOPY, LEFT VIDEO ASSITED THORACOSCOPY, ROBOTIC ASSSITED MEDIASTINAL LYMPHADENECTOMY WITH INTERCOSTAL NERVE BLOCKS;  Surgeon: Raphael Kerr III, MD;  Location: [facility] OR;  Service: DaVinci;  Laterality: Left;   • COLONOSCOPY     • VENOUS ACCESS DEVICE (PORT) INSERTION Right 5/20/2021    Procedure: INSERTION VENOUS ACCESS DEVICE;  Surgeon: Raphael Kerr III, MD;  Location: [facility] OR;  Service: Thoracic;  Laterality: Right;   • WRIST SURGERY Right         Family History   Problem Relation Age of Onset   • Cancer Father         LYMPHATIC   • Alcohol abuse Brother    • Colon cancer Maternal Grandmother    • Malig Hyperthermia Neg Hx         Social History     Socioeconomic History   • Marital status:      Spouse name: Not on file   • Number of children: Not on file   • Years of education: Not on file   • Highest education level: Not on file   Tobacco Use   • Smoking status: Current Every Day Smoker      "Packs/day: 1.00     Types: Electronic Cigarette, Cigarettes     Start date: 1981   • Smokeless tobacco: Never Used   • Tobacco comment: ABT 15 CIGARETTES DAILY   Vaping Use   • Vaping Use: Never used   Substance and Sexual Activity   • Alcohol use: Never   • Drug use: Never   • Sexual activity: Yes     Partners: Male        OB History        2    Para   2    Term   2            AB        Living           SAB        TAB        Ectopic        Molar        Multiple        Live Births                Age of menarche-10  Age at menopause-42  Oral contraceptive pill use-15 years  No HRT use  She has 2 children  Age at first live childbirth-17    No Known Allergies     Review of Systems   Review of systems as mentioned in the HPI    Objective   Blood pressure 143/91, pulse 107, temperature 97.1 °F (36.2 °C), temperature source Temporal, resp. rate 18, height 160 cm (62.99\"), weight 83.1 kg (183 lb 3.2 oz), SpO2 95 %, not currently breastfeeding.       Physical Exam  Vitals reviewed.   HENT:      Head: Normocephalic.   Eyes:      Extraocular Movements: Extraocular movements intact.      Conjunctiva/sclera: Conjunctivae normal.      Pupils: Pupils are equal, round, and reactive to light.   Cardiovascular:      Rate and Rhythm: Normal rate and regular rhythm.      Pulses: Normal pulses.      Heart sounds: Normal heart sounds.   Pulmonary:      Effort: Pulmonary effort is normal.      Breath sounds: Normal breath sounds.   Abdominal:      General: Abdomen is flat. Bowel sounds are normal. There is distension.      Palpations: There is no mass.   Musculoskeletal:         General: No swelling. Normal range of motion.      Cervical back: Normal range of motion.   Skin:     General: Skin is warm.   Neurological:      General: No focal deficit present.      Mental Status: She is alert and oriented to person, place, and time.   Psychiatric:         Mood and Affect: Mood normal.         Behavior: Behavior normal. "     Breast Exam: Right breast with a large postbiopsy hematoma in the upper outer quadrant measuring 4 cm.    No other palpable abnormalities.  Left breast with no palpable abnormalities.  No right or left axillary lymphadenopathy    I have reexamined the patient and the results are consistent with the previously documented exam. Salma Snell MD       Infusion on 08/25/2021   Component Date Value Ref Range Status   • WBC 08/25/2021 7.76  3.40 - 10.80 10*3/mm3 Final   • RBC 08/25/2021 3.82  3.77 - 5.28 10*6/mm3 Final   • Hemoglobin 08/25/2021 10.8* 12.0 - 15.9 g/dL Final   • Hematocrit 08/25/2021 35.6  34.0 - 46.6 % Final   • MCV 08/25/2021 93.2  79.0 - 97.0 fL Final   • MCH 08/25/2021 28.3  26.6 - 33.0 pg Final   • MCHC 08/25/2021 30.3* 31.5 - 35.7 g/dL Final   • RDW 08/25/2021 20.2* 12.3 - 15.4 % Final   • RDW-SD 08/25/2021 67.4* 37.0 - 54.0 fl Final   • MPV 08/25/2021 9.1  6.0 - 12.0 fL Final   • Platelets 08/25/2021 255  140 - 450 10*3/mm3 Final   • Neutrophil % 08/25/2021 62.9  42.7 - 76.0 % Final   • Lymphocyte % 08/25/2021 17.3* 19.6 - 45.3 % Final   • Monocyte % 08/25/2021 11.6  5.0 - 12.0 % Final   • Eosinophil % 08/25/2021 6.4* 0.3 - 6.2 % Final   • Basophil % 08/25/2021 0.9  0.0 - 1.5 % Final   • Immature Grans % 08/25/2021 0.9* 0.0 - 0.5 % Final   • Neutrophils, Absolute 08/25/2021 4.88  1.70 - 7.00 10*3/mm3 Final   • Lymphocytes, Absolute 08/25/2021 1.34  0.70 - 3.10 10*3/mm3 Final   • Monocytes, Absolute 08/25/2021 0.90  0.10 - 0.90 10*3/mm3 Final   • Eosinophils, Absolute 08/25/2021 0.50* 0.00 - 0.40 10*3/mm3 Final   • Basophils, Absolute 08/25/2021 0.07  0.00 - 0.20 10*3/mm3 Final   • Immature Grans, Absolute 08/25/2021 0.07* 0.00 - 0.05 10*3/mm3 Final   • nRBC 08/25/2021 0.0  0.0 - 0.2 /100 WBC Final   Infusion on 08/11/2021   Component Date Value Ref Range Status   • Glucose 08/11/2021 108  74 - 124 mg/dL Final   • BUN 08/11/2021 15  6 - 20 mg/dL Final   • Creatinine 08/11/2021 0.99  0.60 - 1.10  mg/dL Final   • Sodium 08/11/2021 136  134 - 145 mmol/L Final   • Potassium 08/11/2021 5.1* 3.5 - 4.7 mmol/L Final    Slight hemolysis detected by analyzer. Results may be affected.   • Chloride 08/11/2021 99  98 - 107 mmol/L Final   • CO2 08/11/2021 24.6  22.0 - 29.0 mmol/L Final   • Calcium 08/11/2021 9.5  8.5 - 10.2 mg/dL Final   • Total Protein 08/11/2021 7.0  6.3 - 8.0 g/dL Final   • Albumin 08/11/2021 4.30  3.50 - 5.20 g/dL Final   • ALT (SGPT) 08/11/2021 13  0 - 33 U/L Final   • AST (SGOT) 08/11/2021 18  0 - 32 U/L Final   • Alkaline Phosphatase 08/11/2021 118* 38 - 116 U/L Final   • Total Bilirubin 08/11/2021 <0.2* 0.2 - 1.2 mg/dL Final   • eGFR Non African Amer 08/11/2021 58* >60 mL/min/1.73 Final   • Globulin 08/11/2021 2.7  1.8 - 3.5 gm/dL Final   • A/G Ratio 08/11/2021 1.6  1.1 - 2.4 g/dL Final   • BUN/Creatinine Ratio 08/11/2021 15.2  7.3 - 30.0 Final   • Anion Gap 08/11/2021 12.4  5.0 - 15.0 mmol/L Final   • TSH 08/11/2021 1.090  0.270 - 4.200 uIU/mL Final   • Free T4 08/11/2021 1.48  0.93 - 1.70 ng/dL Final   • WBC 08/11/2021 11.57* 3.40 - 10.80 10*3/mm3 Final   • RBC 08/11/2021 4.22  3.77 - 5.28 10*6/mm3 Final   • Hemoglobin 08/11/2021 11.6* 12.0 - 15.9 g/dL Final   • Hematocrit 08/11/2021 37.4  34.0 - 46.6 % Final   • MCV 08/11/2021 88.6  79.0 - 97.0 fL Final   • MCH 08/11/2021 27.5  26.6 - 33.0 pg Final   • MCHC 08/11/2021 31.0* 31.5 - 35.7 g/dL Final   • RDW 08/11/2021 20.3* 12.3 - 15.4 % Final   • RDW-SD 08/11/2021 64.4* 37.0 - 54.0 fl Final   • MPV 08/11/2021 9.2  6.0 - 12.0 fL Final   • Platelets 08/11/2021 286  140 - 450 10*3/mm3 Final   • Neutrophil % 08/11/2021 72.7  42.7 - 76.0 % Final   • Lymphocyte % 08/11/2021 13.5* 19.6 - 45.3 % Final   • Monocyte % 08/11/2021 10.3  5.0 - 12.0 % Final   • Eosinophil % 08/11/2021 1.4  0.3 - 6.2 % Final   • Basophil % 08/11/2021 0.8  0.0 - 1.5 % Final   • Immature Grans % 08/11/2021 1.3* 0.0 - 0.5 % Final   • Neutrophils, Absolute 08/11/2021 8.42* 1.70 -  7.00 10*3/mm3 Final   • Lymphocytes, Absolute 08/11/2021 1.56  0.70 - 3.10 10*3/mm3 Final   • Monocytes, Absolute 08/11/2021 1.19* 0.10 - 0.90 10*3/mm3 Final   • Eosinophils, Absolute 08/11/2021 0.16  0.00 - 0.40 10*3/mm3 Final   • Basophils, Absolute 08/11/2021 0.09  0.00 - 0.20 10*3/mm3 Final   • Immature Grans, Absolute 08/11/2021 0.15* 0.00 - 0.05 10*3/mm3 Final   • nRBC 08/11/2021 0.0  0.0 - 0.2 /100 WBC Final   Hospital Outpatient Visit on 08/05/2021   Component Date Value Ref Range Status   • Glucose 08/05/2021 110  70 - 130 mg/dL Final    Meter: [id number] : [id number] Farrukh Jesusita RTCT   Infusion on 07/27/2021   Component Date Value Ref Range Status   • Glucose 07/27/2021 103  74 - 124 mg/dL Final   • BUN 07/27/2021 13  6 - 20 mg/dL Final   • Creatinine 07/27/2021 0.85  0.60 - 1.10 mg/dL Final   • Sodium 07/27/2021 141  134 - 145 mmol/L Final   • Potassium 07/27/2021 4.4  3.5 - 4.7 mmol/L Final    Slight hemolysis detected by analyzer. Results may be affected.   • Chloride 07/27/2021 104  98 - 107 mmol/L Final   • CO2 07/27/2021 24.9  22.0 - 29.0 mmol/L Final   • Calcium 07/27/2021 9.2  8.5 - 10.2 mg/dL Final   • Total Protein 07/27/2021 6.9  6.3 - 8.0 g/dL Final   • Albumin 07/27/2021 3.90  3.50 - 5.20 g/dL Final   • ALT (SGPT) 07/27/2021 10  0 - 33 U/L Final   • AST (SGOT) 07/27/2021 17  0 - 32 U/L Final    Slight hemolysis detected by analyzer. Results may be affected.   • Alkaline Phosphatase 07/27/2021 122* 38 - 116 U/L Final   • Total Bilirubin 07/27/2021 <0.2* 0.2 - 1.2 mg/dL Final   • eGFR Non  Amer 07/27/2021 69  >60 mL/min/1.73 Final   • Globulin 07/27/2021 3.0  1.8 - 3.5 gm/dL Final   • A/G Ratio 07/27/2021 1.3  1.1 - 2.4 g/dL Final   • BUN/Creatinine Ratio 07/27/2021 15.3  7.3 - 30.0 Final   • Anion Gap 07/27/2021 12.1  5.0 - 15.0 mmol/L Final   • Magnesium 07/27/2021 1.5* 1.8 - 2.5 mg/dL Final   • WBC 07/27/2021 6.33  3.40 - 10.80 10*3/mm3 Final   • RBC 07/27/2021 3.87  3.77 - 5.28  10*6/mm3 Final   • Hemoglobin 07/27/2021 10.4* 12.0 - 15.9 g/dL Final   • Hematocrit 07/27/2021 34.6  34.0 - 46.6 % Final   • MCV 07/27/2021 89.4  79.0 - 97.0 fL Final   • MCH 07/27/2021 26.9  26.6 - 33.0 pg Final   • MCHC 07/27/2021 30.1* 31.5 - 35.7 g/dL Final   • RDW 07/27/2021 19.2* 12.3 - 15.4 % Final   • RDW-SD 07/27/2021 60.2* 37.0 - 54.0 fl Final   • MPV 07/27/2021 9.0  6.0 - 12.0 fL Final   • Platelets 07/27/2021 391  140 - 450 10*3/mm3 Final   • Neutrophil % 07/27/2021 75.7  42.7 - 76.0 % Final   • Lymphocyte % 07/27/2021 12.3* 19.6 - 45.3 % Final   • Monocyte % 07/27/2021 8.5  5.0 - 12.0 % Final   • Eosinophil % 07/27/2021 1.6  0.3 - 6.2 % Final   • Basophil % 07/27/2021 0.8  0.0 - 1.5 % Final   • Immature Grans % 07/27/2021 1.1* 0.0 - 0.5 % Final   • Neutrophils, Absolute 07/27/2021 4.79  1.70 - 7.00 10*3/mm3 Final   • Lymphocytes, Absolute 07/27/2021 0.78  0.70 - 3.10 10*3/mm3 Final   • Monocytes, Absolute 07/27/2021 0.54  0.10 - 0.90 10*3/mm3 Final   • Eosinophils, Absolute 07/27/2021 0.10  0.00 - 0.40 10*3/mm3 Final   • Basophils, Absolute 07/27/2021 0.05  0.00 - 0.20 10*3/mm3 Final   • Immature Grans, Absolute 07/27/2021 0.07* 0.00 - 0.05 10*3/mm3 Final   • nRBC 07/27/2021 0.0  0.0 - 0.2 /100 WBC Final        NM Pet Skull Base To Mid Thigh    Result Date: 8/6/2021  1.  Interval decrease in size and/or FDG uptake of the left upper lobe pulmonary nodule and mediastinal left hilar adenopathy representing patient's known malignancy and likely metastatic disease. Continued attention on follow-up of these areas is recommended to as residual malignancy cannot be excluded. 2.  Sub-6 mm pulmonary nodule within the left upper lobe is new since 04/14/2021 and below PET resolution. Sub-6 mm pulmonary nodules within the right lower lobe are grossly stable. Findings are indeterminate and continued close attention on follow-up with chest CT in 3 months is recommended. 3.  Continued decrease in size of the irregular  masslike soft tissue and fat density within the right breast at least in part representing post biopsy changes. The sterility of this collection cannot be determined based on imaging and correlation with patient history and physical exam is recommended to exclude developing abscess. The known bilateral breast malignancies are not well visualized on PET/CT. 4.  New segmental left eighth rib fractures which are incompletely united and healing. A new band of sclerosis projects through the left seventh rib without significant FDG uptake and while findings in this area are favored to represent a healing nondisplaced fracture, attention on above-mentioned chest CT is recommended to ensure appropriate evolution and exclude the small possibility of metastatic disease. 5.  Somewhat focal area of FDG uptake within the first and second portions of the duodenum. Findings can be seen in setting of duodenitis in the appropriate clinical context and correlation with patient history is recommended with follow-up endoscopy if clinically indicated. 6.  The indeterminate lesion seen within the L1 vertebral body on prior lumbar spine MRI is not well evaluated on PET/CT and please refer to lumbar spine MRI 05/27/2021 for follow-up recommendations. 7.  Other findings as above.  This report was finalized on 8/6/2021 8:06 AM by Dr. Serjio Lorenz M.D.      US Breast Left Limited    Result Date: 8/24/2021  1.  Decreased size of a post biopsy hematoma at the site of biopsy-proven malignancy in the right breast with residual calcifications, as above, which have decreased in number but are greater in extent, likely due to the superimposed hematoma. Continued oncologic and surgical management are recommended. 2.  A focal asymmetry at a site of biopsy-proven malignancy in the left breast is slightly larger, which may be due to tumor progression versus superimposed postbiopsy changes. Exact measurements on ultrasound are difficult to determine.  Continued oncologic and surgical management are recommended. Adjacent calcifications have increased in number and should be included at the time of surgical excision.  BI-RADS Category 6: Known biopsy-proven malignancy  This report was finalized on 8/24/2021 1:08 PM by Dr. Mary Goetz M.D.      Mammo Diagnostic Digital Tomosynthesis Bilateral With CAD    Result Date: 8/24/2021  1.  Decreased size of a post biopsy hematoma at the site of biopsy-proven malignancy in the right breast with residual calcifications, as above, which have decreased in number but are greater in extent, likely due to the superimposed hematoma. Continued oncologic and surgical management are recommended. 2.  A focal asymmetry at a site of biopsy-proven malignancy in the left breast is slightly larger, which may be due to tumor progression versus superimposed postbiopsy changes. Exact measurements on ultrasound are difficult to determine. Continued oncologic and surgical management are recommended. Adjacent calcifications have increased in number and should be included at the time of surgical excision.  BI-RADS Category 6: Known biopsy-proven malignancy  This report was finalized on 8/24/2021 1:08 PM by Dr. Mary Goetz M.D.       CBC 8/25/2021-WBC 7.76, hemoglobin 10.8, platelets 255    8/24/2021 bilateral diagnostic mammogram and bilateral ultrasound-images independently reviewed and interpreted by me  In the left breast there is a 1 cm focal asymmetry.  Previously measured 0.8 cm on 3/1/2021.  1.4 cm group of calcifications adjacent to the superior lateral margin of the biopsy clip which are new.    In the right breast there is a 3.5 cm postbiopsy hematoma.  This has decreased in size.  Loosely grouped residual calcifications measuring up to 2.5 cm.  These have decreased in number compared to 3/23/2021.  However the extent measures slightly larger could be due to superimposed hematoma.    Ultrasound  At 10 o'clock position of the left  breast, 6 cm from the nipple there is an echogenic focus from the biopsy clip and the malignancy measures 0.4 x 0.3 cm.    Assessment/Plan      *Bilateral breast cancer  · Invasive ductal carcinoma in both right and left breast and lesions measure under 1 cm.  No palpable lymphadenopathy although unsure if this has been evaluated by an axillary ultrasound.  No abnormal axillary lymphadenopathy noted on PET.  · Most likely clinical T1b N0 M0, both cancers were noted to be grade 2, ER/IA positive and HER-2 negative and Ki-67 less than 15%  VATS on 4/30/2021  Biopsy confirms adenocarcinoma of pulmonary primary  Discussed with Dr. Molina about delaying breast surgery and she is in agreement  Darted on neoadjuvant anastrozole  · Patient experienced significant adverse effects with anastrozole  · Treatment changed to letrozole   · Axillary ultrasound 4/20/2021 -negative for any axillary lymphadenopathy  · Started letrozole in May 2021  · She continues on letrozole without any problems.  · 8/24/2021 bilateral diagnostic mammogram and ultrasound.  In the left breast the tumor now measures 1 cm on mammogram as opposed to 0.8 cm in March.  Right breast calcifications also noted to be in a large extent although the number seems to be decreased.  Hematoma has decreased in size.  · Since she has completed concurrent chemoradiation and questionable increase in size of the left breast lesion I think it would be reasonable to proceed with surgery if Dr. Molina thinks that this is amenable.  · She will be sent back to see Dr. Clay.    *Adenocarcinoma of the left lung  · Most likely stage T1 N2 M0, stage III  · MRI of the thoracic spine ordered to further evaluate the abnormality seen on PET  · MRI of the brain negative   · Given that she is only 55 and fairly good performance status I discussed with her by with concurrent chemoradiation with cisplatin and Alimta every 3 weeks followed by durvalumab.  · Adverse effects including  but not limited to myelosuppression, increased risk of infections, nausea, vomiting, renal dysfunction, ototoxicity, neurotoxicity have been discussed at length.  · MRI of the thoracic spine performed 5/24/2021 shows no evidence of metastatic disease in the thoracic spine however in L1 there is a 7 mm lesion which is indeterminate.  A lumbar spine MRI has been recommended.  · Discussed her case with Dr. Oates and he feels like MRI of the lumbar spine may also show that this lesion is indeterminate.  · Discussed the same with the patient and her .  · Even if this is metastatic disease this might be the only lesion of metastatic disease and then would consider this oligometastatic disease.  · Therefore plan to proceed with concurrent chemoradiation as scheduled.  · cisplatin and Alimta, C1D1 5/25/2021  · Day 1 of radiation 5/25/2021  · 5/27/2021-MRI of the lumbar spine-there is a 6 x 5 x 6 mm enhancing lesion in the left posterior body of L1 vertebra.  This is considered indeterminate.  Follow-up recommended.  Degenerative changes noted at L5-S1  · 7/7/2021, cycle 3 cisplatin Alimta  · 7/12/2021-radiation completed  · PET/CT 8/6/2021 with good response to chemoradiation.  · Continue with durvalumab    *Lumbar spine lesion-L1 lesion indeterminate.  MRI of the lumbar spine spine images independently reviewed and interpreted by me in detail summarized above.  Plan to repeat MRI in 4 months.  This will be due in September 2021  · Ordered MRI today    *Bilateral breast cancer-no family history of breast cancer but given synchronous breast cancer genetic testing has been sent.  Genetic testing with a variant of unknown significance.    *Right hand numbness and difficulty with grasping objects  · Previously had history of carpal tunnel requiring repair.  · She is currently on letrozole which could cause worsening of the carpal tunnel  · Gabapentin dose will be increased to 600 mg 3 times daily  · She will also be  referred to hand surgery for further evaluation.    *Anxiety-she is on several different medications currently.  Referral has been made to supportive oncology clinic today.  She is requesting an increase in the dose of the Xanax.  Okay to increase the dose to 1 mg nightly to help with sleep.  Further management of these medications will be deferred to supportive oncology once she has established care with them.  He has been started on gabapentin.  Continue follow-up with supportive oncology  Stable    *Left-sided chest wall pain-secondary to VATS.  Most likely neuropathic.  Continue gabapentin and Norco as needed.  She ran out of gabapentin and experienced leg cramps.    Continue gabapentin    *Abdominal bloating-this has improved.  Continue senna S and MiraLAX as needed    *Hypertension-continue lisinopril.  Blood pressure 143/91    *Smoking-she has not smoked in the past 2 days.  Trying to quit smoking.  Congratulated on her efforts.  Not addressed today    *GERD  · 7/7/2021, patient complains of reflux despite the use of Prilosec 20 mg twice daily.  We will send her a prescription for Carafate slurry 4 times daily.  · She has not quite started using the Carafate.  · Continue Prilosec and recommend using Carafate  · PET/CT shows duodenitis      *Port not returning blood  · Patient reports the last treatment that difficulty getting blood return.  Today nursing was unable to get blood return and have placed Activase without success.  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  The end of the catheter has backed up into the innominate vein.  This was discussed with Dr. Snell and there is no need for additional imaging at this time.  WE WILL NOT USE THE PORT.   · She has been started on Eliquis.  Completed a week of 10 mg twice daily and now on 5 mg.   Continue the same  · We will obtain imaging to see if the disease is responded.  If patient wishes to get the port out then we will continue with anticoagulation for 3 months and remove the port.  However if she wishes to keep the port then a port revision will have to be performed.    · Continue Eliquis and remove port after 3 months  · Continue Eliquis at this time.  Port can be removed at the time of breast surgery    PLAN:  1. Tinea durvalumab  2. Continue Eliquis 5 mg twice daily  3. Schedule follow-up with Dr. Molina to plan breast surgery  4. Continue letrozole 2.5 mg daily.  We will reevaluate the need to change to a different AI depending on the final pathology after surgery  5. Continue omeprazole 20mg twice daily.  6. Continue Carafate 4 times daily.   7. Follow-up in 1 month    Patient is on treatment requiring close monitoring for toxicities.  Today's visit entailed reviewing the diagnostic mammogram and ultrasound and possible progression of the breast cancer.      Salma Snell MD

## 2021-08-26 ENCOUNTER — APPOINTMENT (OUTPATIENT)
Dept: ULTRASOUND IMAGING | Facility: HOSPITAL | Age: 56
End: 2021-08-26

## 2021-08-26 ENCOUNTER — APPOINTMENT (OUTPATIENT)
Dept: MAMMOGRAPHY | Facility: HOSPITAL | Age: 56
End: 2021-08-26

## 2021-08-27 ENCOUNTER — TELEPHONE (OUTPATIENT)
Dept: SURGERY | Facility: CLINIC | Age: 56
End: 2021-08-27

## 2021-08-27 ENCOUNTER — APPOINTMENT (OUTPATIENT)
Dept: LAB | Facility: HOSPITAL | Age: 56
End: 2021-08-27

## 2021-08-27 NOTE — TELEPHONE ENCOUNTER
CBC called to make an appointment for breast surgery. I see in your notes that this case is complicated. I assume you will want to see her in the office first.  When do you want this scheduled?    (just fyi, I am out the next 2 weeks, so please reach out to someone in the office)

## 2021-08-30 ENCOUNTER — TELEPHONE (OUTPATIENT)
Dept: ONCOLOGY | Facility: CLINIC | Age: 56
End: 2021-08-30

## 2021-08-30 NOTE — TELEPHONE ENCOUNTER
Provider: DR NAM  Caller: TANIYA  Relationship to Patient: SELF  Pharmacy: [facility]  Phone Number: [phone]  Reason for Call: PT SAYS PHARM WON'T FILL MEDICATION BECAUSE IT HAS TWO DIFFERENT INSTRUCTIONS ON IT

## 2021-08-30 NOTE — TELEPHONE ENCOUNTER
Pt states pharmacy unable to fill gabapentin without clarification on dose. Pt has been taking 1 capsule in the morning and 2 capsules at bedtime but states Dr. Snell had discussed increasing the dose on this. Clarification received and provided to Edda at pt's pharmacy. Pt v/u.

## 2021-09-01 ENCOUNTER — APPOINTMENT (OUTPATIENT)
Dept: MRI IMAGING | Facility: HOSPITAL | Age: 56
End: 2021-09-01

## 2021-09-02 ENCOUNTER — APPOINTMENT (OUTPATIENT)
Dept: MRI IMAGING | Facility: HOSPITAL | Age: 56
End: 2021-09-02

## 2021-09-03 NOTE — TELEPHONE ENCOUNTER
Caller: Holli Patel    Relationship: Self    Best call back number: [phone]*    What was the call regarding: PATIENT CALLED BACK TO CHECK ON STATUS OF REFILL. THE PATIENT STATES SHE IS OUT OF MEDICATION AND NEEDS A REFILL ASAP.    Do you require a callback: YES ASAP.

## 2021-09-03 NOTE — TELEPHONE ENCOUNTER
Caller: Holli Patel    Relationship: Self    Best call back number: [phone]    Medication needed:   Requested Prescriptions     Pending Prescriptions Disp Refills   • rizatriptan (MAXALT) 10 MG tablet [Pharmacy Med Name: RIZATRIPTAN 10 MG TABLET] 12 tablet 0     Sig: TAKE ONE TABLET BY MOUTH AT ONSET OF HEADACHE; MAY REPEAT ONE TABLET IN 2 HOURS IF NEEDED.       When do you need the refill by: TODAY 09/03    What additional details did the patient provide when requesting the medication: PATIENT IS COMPLETELY OUT OF MEDICAITON    Does the patient have less than a 3 day supply:  [x] Yes  [] No    What is the patient's preferred pharmacy: [address] [facility] AT [facility] [address] [facility] [phone] [facility] [phone] FX

## 2021-09-03 NOTE — TELEPHONE ENCOUNTER
Rx Refill Note  Requested Prescriptions     Pending Prescriptions Disp Refills   • rizatriptan (MAXALT) 10 MG tablet [Pharmacy Med Name: RIZATRIPTAN 10 MG TABLET] 12 tablet 0     Sig: TAKE ONE TABLET BY MOUTH AT ONSET OF HEADACHE; MAY REPEAT ONE TABLET IN 2 HOURS IF NEEDED.      Last office visit with prescribing clinician: 8/3/2021      Next office visit with prescribing clinician: 11/16/2021            Anisa Car MA  09/03/21, 16:05 EDT

## 2021-09-05 RX ORDER — RIZATRIPTAN BENZOATE 10 MG/1
TABLET ORAL
Qty: 12 TABLET | Refills: 0 | Status: SHIPPED | OUTPATIENT
Start: 2021-09-05 | End: 2021-10-04

## 2021-09-08 ENCOUNTER — INFUSION (OUTPATIENT)
Dept: ONCOLOGY | Facility: HOSPITAL | Age: 56
End: 2021-09-08

## 2021-09-08 VITALS
HEART RATE: 109 BPM | DIASTOLIC BLOOD PRESSURE: 74 MMHG | OXYGEN SATURATION: 94 % | TEMPERATURE: 98.4 F | SYSTOLIC BLOOD PRESSURE: 108 MMHG

## 2021-09-08 DIAGNOSIS — C34.12 PRIMARY ADENOCARCINOMA OF UPPER LOBE OF LEFT LUNG (HCC): Primary | ICD-10-CM

## 2021-09-08 LAB
ALBUMIN SERPL-MCNC: 4.2 G/DL (ref 3.5–5.2)
ALBUMIN/GLOB SERPL: 1.8 G/DL (ref 1.1–2.4)
ALP SERPL-CCNC: 92 U/L (ref 38–116)
ALT SERPL W P-5'-P-CCNC: 10 U/L (ref 0–33)
ANION GAP SERPL CALCULATED.3IONS-SCNC: 8.9 MMOL/L (ref 5–15)
AST SERPL-CCNC: 16 U/L (ref 0–32)
BASOPHILS # BLD AUTO: 0.06 10*3/MM3 (ref 0–0.2)
BASOPHILS NFR BLD AUTO: 0.8 % (ref 0–1.5)
BILIRUB SERPL-MCNC: <0.2 MG/DL (ref 0.2–1.2)
BUN SERPL-MCNC: 14 MG/DL (ref 6–20)
BUN/CREAT SERPL: 11.6 (ref 7.3–30)
CALCIUM SPEC-SCNC: 9.3 MG/DL (ref 8.5–10.2)
CHLORIDE SERPL-SCNC: 104 MMOL/L (ref 98–107)
CO2 SERPL-SCNC: 27.1 MMOL/L (ref 22–29)
CREAT SERPL-MCNC: 1.21 MG/DL (ref 0.6–1.1)
DEPRECATED RDW RBC AUTO: 58.6 FL (ref 37–54)
EOSINOPHIL # BLD AUTO: 0.19 10*3/MM3 (ref 0–0.4)
EOSINOPHIL NFR BLD AUTO: 2.7 % (ref 0.3–6.2)
ERYTHROCYTE [DISTWIDTH] IN BLOOD BY AUTOMATED COUNT: 17.2 % (ref 12.3–15.4)
GFR SERPL CREATININE-BSD FRML MDRD: 46 ML/MIN/1.73
GLOBULIN UR ELPH-MCNC: 2.4 GM/DL (ref 1.8–3.5)
GLUCOSE SERPL-MCNC: 85 MG/DL (ref 74–124)
HCT VFR BLD AUTO: 36.2 % (ref 34–46.6)
HGB BLD-MCNC: 11.1 G/DL (ref 12–15.9)
IMM GRANULOCYTES # BLD AUTO: 0.03 10*3/MM3 (ref 0–0.05)
IMM GRANULOCYTES NFR BLD AUTO: 0.4 % (ref 0–0.5)
LYMPHOCYTES # BLD AUTO: 1.2 10*3/MM3 (ref 0.7–3.1)
LYMPHOCYTES NFR BLD AUTO: 16.8 % (ref 19.6–45.3)
MCH RBC QN AUTO: 28.5 PG (ref 26.6–33)
MCHC RBC AUTO-ENTMCNC: 30.7 G/DL (ref 31.5–35.7)
MCV RBC AUTO: 92.8 FL (ref 79–97)
MONOCYTES # BLD AUTO: 0.84 10*3/MM3 (ref 0.1–0.9)
MONOCYTES NFR BLD AUTO: 11.8 % (ref 5–12)
NEUTROPHILS NFR BLD AUTO: 4.82 10*3/MM3 (ref 1.7–7)
NEUTROPHILS NFR BLD AUTO: 67.5 % (ref 42.7–76)
NRBC BLD AUTO-RTO: 0 /100 WBC (ref 0–0.2)
PLATELET # BLD AUTO: 228 10*3/MM3 (ref 140–450)
PMV BLD AUTO: 8.5 FL (ref 6–12)
POTASSIUM SERPL-SCNC: 4.9 MMOL/L (ref 3.5–4.7)
PROT SERPL-MCNC: 6.6 G/DL (ref 6.3–8)
RBC # BLD AUTO: 3.9 10*6/MM3 (ref 3.77–5.28)
SODIUM SERPL-SCNC: 140 MMOL/L (ref 134–145)
WBC # BLD AUTO: 7.14 10*3/MM3 (ref 3.4–10.8)

## 2021-09-08 PROCEDURE — 96413 CHEMO IV INFUSION 1 HR: CPT

## 2021-09-08 PROCEDURE — 85025 COMPLETE CBC W/AUTO DIFF WBC: CPT

## 2021-09-08 PROCEDURE — 25010000002 DURVALUMAB 50 MG/ML SOLUTION 2.4 ML VIAL: Performed by: NURSE PRACTITIONER

## 2021-09-08 PROCEDURE — 80053 COMPREHEN METABOLIC PANEL: CPT

## 2021-09-08 PROCEDURE — 25010000002 DURVALUMAB 50 MG/ML SOLUTION 10 ML VIAL: Performed by: NURSE PRACTITIONER

## 2021-09-08 RX ORDER — SODIUM CHLORIDE 9 MG/ML
250 INJECTION, SOLUTION INTRAVENOUS ONCE
Status: COMPLETED | OUTPATIENT
Start: 2021-09-08 | End: 2021-09-08

## 2021-09-08 RX ADMIN — SODIUM CHLORIDE 250 ML: 9 INJECTION, SOLUTION INTRAVENOUS at 16:03

## 2021-09-08 RX ADMIN — SODIUM CHLORIDE 780 MG: 900 INJECTION, SOLUTION INTRAVENOUS at 16:02

## 2021-09-12 ENCOUNTER — APPOINTMENT (OUTPATIENT)
Dept: MRI IMAGING | Facility: HOSPITAL | Age: 56
End: 2021-09-12

## 2021-09-20 ENCOUNTER — HOSPITAL ENCOUNTER (OUTPATIENT)
Dept: MRI IMAGING | Facility: HOSPITAL | Age: 56
Discharge: HOME | End: 2021-09-20

## 2021-09-20 DIAGNOSIS — C50.411 MALIGNANT NEOPLASM OF UPPER-OUTER QUADRANT OF RIGHT BREAST IN FEMALE, ESTROGEN RECEPTOR POSITIVE (HCC): ICD-10-CM

## 2021-09-20 DIAGNOSIS — Z17.0 MALIGNANT NEOPLASM OF UPPER-INNER QUADRANT OF LEFT BREAST IN FEMALE, ESTROGEN RECEPTOR POSITIVE (HCC): ICD-10-CM

## 2021-09-20 DIAGNOSIS — G56.00 CARPAL TUNNEL SYNDROME, UNSPECIFIED LATERALITY: ICD-10-CM

## 2021-09-20 DIAGNOSIS — C50.212 MALIGNANT NEOPLASM OF UPPER-INNER QUADRANT OF LEFT BREAST IN FEMALE, ESTROGEN RECEPTOR POSITIVE (HCC): ICD-10-CM

## 2021-09-20 DIAGNOSIS — Z17.0 MALIGNANT NEOPLASM OF UPPER-OUTER QUADRANT OF RIGHT BREAST IN FEMALE, ESTROGEN RECEPTOR POSITIVE (HCC): ICD-10-CM

## 2021-09-20 PROCEDURE — 82565 ASSAY OF CREATININE: CPT

## 2021-09-22 ENCOUNTER — INFUSION (OUTPATIENT)
Dept: ONCOLOGY | Facility: HOSPITAL | Age: 56
End: 2021-09-22

## 2021-09-22 ENCOUNTER — OFFICE VISIT (OUTPATIENT)
Dept: ONCOLOGY | Facility: CLINIC | Age: 56
End: 2021-09-22

## 2021-09-22 VITALS
WEIGHT: 185.3 LBS | BODY MASS INDEX: 32.83 KG/M2 | OXYGEN SATURATION: 94 % | TEMPERATURE: 96.8 F | HEIGHT: 63 IN | SYSTOLIC BLOOD PRESSURE: 117 MMHG | DIASTOLIC BLOOD PRESSURE: 80 MMHG | HEART RATE: 99 BPM | RESPIRATION RATE: 18 BRPM

## 2021-09-22 DIAGNOSIS — Z87.19 HISTORY OF GASTROESOPHAGEAL REFLUX (GERD): Primary | ICD-10-CM

## 2021-09-22 DIAGNOSIS — C34.12 PRIMARY ADENOCARCINOMA OF UPPER LOBE OF LEFT LUNG (HCC): Primary | ICD-10-CM

## 2021-09-22 DIAGNOSIS — C34.12 PRIMARY ADENOCARCINOMA OF UPPER LOBE OF LEFT LUNG (HCC): ICD-10-CM

## 2021-09-22 LAB
ALBUMIN SERPL-MCNC: 4.3 G/DL (ref 3.5–5.2)
ALBUMIN/GLOB SERPL: 1.5 G/DL (ref 1.1–2.4)
ALP SERPL-CCNC: 97 U/L (ref 38–116)
ALT SERPL W P-5'-P-CCNC: 13 U/L (ref 0–33)
ANION GAP SERPL CALCULATED.3IONS-SCNC: 11.8 MMOL/L (ref 5–15)
AST SERPL-CCNC: 17 U/L (ref 0–32)
BASOPHILS # BLD AUTO: 0.06 10*3/MM3 (ref 0–0.2)
BASOPHILS NFR BLD AUTO: 0.7 % (ref 0–1.5)
BILIRUB SERPL-MCNC: <0.2 MG/DL (ref 0.2–1.2)
BUN SERPL-MCNC: 14 MG/DL (ref 6–20)
BUN/CREAT SERPL: 14.4 (ref 7.3–30)
CALCIUM SPEC-SCNC: 9.5 MG/DL (ref 8.5–10.2)
CHLORIDE SERPL-SCNC: 102 MMOL/L (ref 98–107)
CO2 SERPL-SCNC: 25.2 MMOL/L (ref 22–29)
CREAT SERPL-MCNC: 0.97 MG/DL (ref 0.6–1.1)
DEPRECATED RDW RBC AUTO: 51.5 FL (ref 37–54)
EOSINOPHIL # BLD AUTO: 0.19 10*3/MM3 (ref 0–0.4)
EOSINOPHIL NFR BLD AUTO: 2.2 % (ref 0.3–6.2)
ERYTHROCYTE [DISTWIDTH] IN BLOOD BY AUTOMATED COUNT: 14.6 % (ref 12.3–15.4)
GFR SERPL CREATININE-BSD FRML MDRD: 59 ML/MIN/1.73
GLOBULIN UR ELPH-MCNC: 2.8 GM/DL (ref 1.8–3.5)
GLUCOSE SERPL-MCNC: 93 MG/DL (ref 74–124)
HCT VFR BLD AUTO: 37.3 % (ref 34–46.6)
HGB BLD-MCNC: 11.4 G/DL (ref 12–15.9)
IMM GRANULOCYTES # BLD AUTO: 0.04 10*3/MM3 (ref 0–0.05)
IMM GRANULOCYTES NFR BLD AUTO: 0.5 % (ref 0–0.5)
LYMPHOCYTES # BLD AUTO: 1.35 10*3/MM3 (ref 0.7–3.1)
LYMPHOCYTES NFR BLD AUTO: 15.4 % (ref 19.6–45.3)
MCH RBC QN AUTO: 28.9 PG (ref 26.6–33)
MCHC RBC AUTO-ENTMCNC: 30.6 G/DL (ref 31.5–35.7)
MCV RBC AUTO: 94.7 FL (ref 79–97)
MONOCYTES # BLD AUTO: 0.86 10*3/MM3 (ref 0.1–0.9)
MONOCYTES NFR BLD AUTO: 9.8 % (ref 5–12)
NEUTROPHILS NFR BLD AUTO: 6.26 10*3/MM3 (ref 1.7–7)
NEUTROPHILS NFR BLD AUTO: 71.4 % (ref 42.7–76)
NRBC BLD AUTO-RTO: 0 /100 WBC (ref 0–0.2)
PLATELET # BLD AUTO: 266 10*3/MM3 (ref 140–450)
PMV BLD AUTO: 8.9 FL (ref 6–12)
POTASSIUM SERPL-SCNC: 4.3 MMOL/L (ref 3.5–4.7)
PROT SERPL-MCNC: 7.1 G/DL (ref 6.3–8)
RBC # BLD AUTO: 3.94 10*6/MM3 (ref 3.77–5.28)
SODIUM SERPL-SCNC: 139 MMOL/L (ref 134–145)
T4 FREE SERPL-MCNC: 1.1 NG/DL (ref 0.93–1.7)
TSH SERPL DL<=0.05 MIU/L-ACNC: 1.25 UIU/ML (ref 0.27–4.2)
WBC # BLD AUTO: 8.76 10*3/MM3 (ref 3.4–10.8)

## 2021-09-22 PROCEDURE — 25010000002 DURVALUMAB 50 MG/ML SOLUTION 10 ML VIAL: Performed by: INTERNAL MEDICINE

## 2021-09-22 PROCEDURE — 84443 ASSAY THYROID STIM HORMONE: CPT | Performed by: INTERNAL MEDICINE

## 2021-09-22 PROCEDURE — 25010000002 DURVALUMAB 50 MG/ML SOLUTION 2.4 ML VIAL: Performed by: INTERNAL MEDICINE

## 2021-09-22 PROCEDURE — 99214 OFFICE O/P EST MOD 30 MIN: CPT | Performed by: INTERNAL MEDICINE

## 2021-09-22 PROCEDURE — 80053 COMPREHEN METABOLIC PANEL: CPT

## 2021-09-22 PROCEDURE — 84439 ASSAY OF FREE THYROXINE: CPT | Performed by: INTERNAL MEDICINE

## 2021-09-22 PROCEDURE — 85025 COMPLETE CBC W/AUTO DIFF WBC: CPT

## 2021-09-22 PROCEDURE — 96413 CHEMO IV INFUSION 1 HR: CPT

## 2021-09-22 RX ORDER — SODIUM CHLORIDE 9 MG/ML
250 INJECTION, SOLUTION INTRAVENOUS ONCE
Status: CANCELLED | OUTPATIENT
Start: 2021-09-22

## 2021-09-22 RX ORDER — SODIUM CHLORIDE 9 MG/ML
250 INJECTION, SOLUTION INTRAVENOUS ONCE
Status: COMPLETED | OUTPATIENT
Start: 2021-09-22 | End: 2021-09-22

## 2021-09-22 RX ORDER — DIAZEPAM 5 MG/1
TABLET ORAL
Qty: 1 TABLET | Refills: 0 | Status: SHIPPED | OUTPATIENT
Start: 2021-09-22 | End: 2021-10-22 | Stop reason: SDUPTHER

## 2021-09-22 RX ADMIN — SODIUM CHLORIDE 250 ML: 9 INJECTION, SOLUTION INTRAVENOUS at 13:15

## 2021-09-22 RX ADMIN — SODIUM CHLORIDE 780 MG: 900 INJECTION, SOLUTION INTRAVENOUS at 13:11

## 2021-09-22 NOTE — PROGRESS NOTES
Subjective   Holli Patel is a 56 y.o. female.  Referred by Dr. 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    3/1/2021-bilateral screening mammogram-microcalcifications seen in the posterior one third of the lateral aspect of the right breast.  Area of focal asymmetry seen in the middle one third of the upper inner quadrant of the left breast.    3/1/2021-DEXA scan-T score of -2.2 on the lumbar spine and T score of -2.3 in the left hip and T score of -1.9 in the right hip.  Findings consistent with osteopenia    3/23/2021-screening lung CT-there is a 10 x 11 mm solid nodule in the left upper lobe.  Enlarged AP window lymph node measuring 14 x 10 mm.  Suggestion of a possible 9 mm left hilar lymph node.  Heavy coronary artery calcification.    3/23/2021-diagnostic mammogram bilateral-cluster of microcalcifications in the middle third lateral aspect of the right breast.  Left breast demonstrates persistence of the area of focal asymmetry in the region.    Ultrasound-left breast ultrasound at 10 o'clock position, 6 cm from the nipple there is a 0.4 cm irregular hypoechoic lesion.  Stereotactic biopsy of the right breast calcifications recommended.  Ultrasound-guided biopsy of the left breast lesion recommended    4/7/2021-right breast stereotactic biopsy and left breast ultrasound-guided biopsy  Pathology  Right breast-invasive ductal carcinoma, grade 2, lymphovascular invasion present, ER +99% strong, ID +80% moderate, HER-2 negative, Ki-67 12%  Left breast upper inner quadrant 10 o'clock position biopsy, invasive ductal carcinoma, grade   2, ER +99% strong, ID +40% strong, HER-2 2+ on immunohistochemistry, nonamplified on FISH Ki-67 10%    4/14/2021-PET/CT-FDG avid aortopulmonary window lymph node measures 0.9 cm with an SUV of 7.5.  FDG avid left hilar lymph node  measures 1 cm with an SUV of 17.2.  Irregular soft tissue density in the right breast measuring 3.7 x 3.8 cm is favored to be secondary to the recent breast biopsy.  1.1 cm pulmonary nodule within the left upper lobe with SUV 9.7 .  Sub-6 mm pulmonary nodules are present in the right lower lobe.  No evidence of lymphadenopathy or metastatic disease in the abdomen.  Focal area of FDG uptake within the central canal posterior to T11-T12 displaced demonstrating an SUV of 5.5 thought to be reactive however MRI is recommended for further evaluation.    She was seen by Dr. Molina who initially planned for breast surgery however  due to the PET abnormalities she has been referred to Dr. Kerr who plans to do a wound on 4/30/2021.  Dr. Molina's and Dr. Kerr's notes reviewed.    Patient denies any family history of breast cancer.  Her mother had lymphoma.  Maternal grandmother had colon cancer.  Prior to that screening mammogram she did not have any palpable abnormalities of either breast.    She has been a heavy smoker for about 40 years and smoked 2 packs/day.  Denies any recent weight changes, new bone pains, cough, abdominal pain nausea vomiting constipation or diarrhea.  She does have anxiety and has been on several medications.  She has recently been started on Xanax to help with the mood and also with insomnia.    Patient had a video-assisted thoracoscopy and mediastinal lymphadenectomy on 4/30/2021.  L5-L6 lymph nodes were biopsied however the small pulmonary nodule in the left upper lobe was not difficult to find.  Pathology showed moderately differentiated adenocarcinoma which is CK7 and TTF-1 positive.  Consistent with lung primary.  Ki-67 85%.    5/14/2021-MRI of the brain-minimal chronic small vessel ischemic change in the white matter.  Otherwise negative MRI.    5/20/2021-bilateral axillary ultrasound-no evidence of axillary lymphadenopathy in either axilla.  Normal-appearing bilateral axillary lymph  nodes visualized.    Cycle 1 cisplatin and Alimta on 5/25/2021.    Cycle 2 cisplatin Alimta 6/15/2021    Cycle 3 cisplatin Alimta 7/7/2021    Completed radiation on 7/12/2021    Port was nonfunctional hence a port study was performed which showed that the port was backed up into the innominate vein and also there was a nonocclusive thrombus at the end of the catheter extending into the superior vena cava.  She was started on anticoagulation with Eliquis.  It was recommended for port revision of the intention is to keep the port.    PET/CT 8/5/2021-images independently reviewed and interpreted by me-decrease in size and FDG uptake of the left upper lobe pulmonary nodule as well as mediastinal and left hilar lymphadenopathy representing response to treatment.  Sub-6 mm pulmonary nodule in the left upper lobe new since 4/14/2021.  This could be related to radiation however follow-up CT in 3 months recommended.  Decrease in size of the irregular masslike tissue in the right breast which is postbiopsy hematoma.  This is not PET avid.  New segmental left eighth rib fractures healing.  A new band of sclerosis of the left seventh rib which favored to represent healing nondisplaced fracture.  Follow-up CT recommended.  Next focal uptake in the duodenum first and second portions.  Could be related to duodenitis.  Indeterminate lesion in the L1 vertebral body not well evaluated on PET/CT.    Interval history  Ms. Patel presents to the clinic today for follow-up accompanied by her .  She previously had numbness in the right upper extremity but has now noticed numbness in both her upper extremities.  She is unsure if the gabapentin has been helping.  Ms. Patel was scheduled to undergo MRI of the lumbar spine however she had a panic attack when she presented for the MRI and hence she was unable to complete this.  She is otherwise tolerating the Imfinzi okay without any complaints.  She has rescheduled her appointment  with Dr. Molina.  She reports being compliant with Eliquis.  She needs a refill on that.  No other complaints at this time    The following portions of the patient's history were reviewed and updated as appropriate: allergies, current medications, past family history, past medical history, past social history, past surgical history and problem list.    Past Medical History:   Diagnosis Date   • Anxiety    • Dyspnea on exertion    • SHAYY (generalized anxiety disorder)     RELATED HIGH BLOOD PRESSURE   • GERD (gastroesophageal reflux disease)    • High blood pressure     ANXIETY RELATED   • Hyperlipidemia    • Hypothyroidism    • Insomnia    • Lung cancer (CMS/HCC)    • Lung nodule    • Malignant neoplasm of upper-outer quadrant of right breast in female, estrogen receptor positive (CMS/HCC) 4/13/2021   • Migraine    • PONV (postoperative nausea and vomiting)         Past Surgical History:   Procedure Laterality Date   • BREAST BIOPSY Bilateral 04/07/2021    Right Breast 10 o'clock & Left breast 10 o'clock 6 cm from nipple, [facility]   • CLOSED REDUCTION HIP DISLOCATION Left 4/30/2021    Procedure: BRONCHOSCOPY, LEFT VIDEO ASSITED THORACOSCOPY, ROBOTIC ASSSITED MEDIASTINAL LYMPHADENECTOMY WITH INTERCOSTAL NERVE BLOCKS;  Surgeon: Raphael Kerr III, MD;  Location: [facility]  Service: DaVinci;  Laterality: Left;   • COLONOSCOPY     • VENOUS ACCESS DEVICE (PORT) INSERTION Right 5/20/2021    Procedure: INSERTION VENOUS ACCESS DEVICE;  Surgeon: Raphael Kerr III, MD;  Location: [facility] OR;  Service: Thoracic;  Laterality: Right;   • WRIST SURGERY Right         Family History   Problem Relation Age of Onset   • Cancer Father         LYMPHATIC   • Alcohol abuse Brother    • Colon cancer Maternal Grandmother    • Malig Hyperthermia Neg Hx         Social History     Socioeconomic History   • Marital status:      Spouse name: Not on file   • Number of children: Not on file   • Years of education: Not on file  "  • Highest education level: Not on file   Tobacco Use   • Smoking status: Current Every Day Smoker     Packs/day: 1.00     Types: Electronic Cigarette, Cigarettes     Start date: 1981   • Smokeless tobacco: Never Used   • Tobacco comment: ABT 15 CIGARETTES DAILY   Vaping Use   • Vaping Use: Never used   Substance and Sexual Activity   • Alcohol use: Never   • Drug use: Never   • Sexual activity: Yes     Partners: Male        OB History        2    Para   2    Term   2            AB        Living           SAB        TAB        Ectopic        Molar        Multiple        Live Births                Age of menarche-10  Age at menopause-42  Oral contraceptive pill use-15 years  No HRT use  She has 2 children  Age at first live childbirth-17    No Known Allergies     Review of Systems   Review of systems as mentioned in the HPI    Objective   Blood pressure 117/80, pulse 99, temperature 96.8 °F (36 °C), temperature source Temporal, resp. rate 18, height 160 cm (62.99\"), weight 84.1 kg (185 lb 4.8 oz), SpO2 94 %, not currently breastfeeding.       Physical Exam  Vitals reviewed.   HENT:      Head: Normocephalic.   Eyes:      Extraocular Movements: Extraocular movements intact.      Conjunctiva/sclera: Conjunctivae normal.      Pupils: Pupils are equal, round, and reactive to light.   Cardiovascular:      Rate and Rhythm: Normal rate and regular rhythm.      Pulses: Normal pulses.      Heart sounds: Normal heart sounds.   Pulmonary:      Effort: Pulmonary effort is normal.      Breath sounds: Normal breath sounds.   Abdominal:      General: Abdomen is flat. Bowel sounds are normal. There is no distension.      Palpations: There is no mass.   Musculoskeletal:         General: No swelling. Normal range of motion.      Cervical back: Normal range of motion.   Skin:     General: Skin is warm.   Neurological:      General: No focal deficit present.      Mental Status: She is alert and oriented to person, " place, and time.   Psychiatric:         Mood and Affect: Mood normal.         Behavior: Behavior normal.     Breast Exam: Right breast with a large postbiopsy hematoma in the upper outer quadrant measuring 4 cm.    No other palpable abnormalities.  Left breast with no palpable abnormalities.  No right or left axillary lymphadenopathy    I have reexamined the patient and the results are consistent with the previously documented exam. Salma Snell MD       Infusion on 09/22/2021   Component Date Value Ref Range Status   • WBC 09/22/2021 8.76  3.40 - 10.80 10*3/mm3 Final   • RBC 09/22/2021 3.94  3.77 - 5.28 10*6/mm3 Final   • Hemoglobin 09/22/2021 11.4* 12.0 - 15.9 g/dL Final   • Hematocrit 09/22/2021 37.3  34.0 - 46.6 % Final   • MCV 09/22/2021 94.7  79.0 - 97.0 fL Final   • MCH 09/22/2021 28.9  26.6 - 33.0 pg Final   • MCHC 09/22/2021 30.6* 31.5 - 35.7 g/dL Final   • RDW 09/22/2021 14.6  12.3 - 15.4 % Final   • RDW-SD 09/22/2021 51.5  37.0 - 54.0 fl Final   • MPV 09/22/2021 8.9  6.0 - 12.0 fL Final   • Platelets 09/22/2021 266  140 - 450 10*3/mm3 Final   • Neutrophil % 09/22/2021 71.4  42.7 - 76.0 % Final   • Lymphocyte % 09/22/2021 15.4* 19.6 - 45.3 % Final   • Monocyte % 09/22/2021 9.8  5.0 - 12.0 % Final   • Eosinophil % 09/22/2021 2.2  0.3 - 6.2 % Final   • Basophil % 09/22/2021 0.7  0.0 - 1.5 % Final   • Immature Grans % 09/22/2021 0.5  0.0 - 0.5 % Final   • Neutrophils, Absolute 09/22/2021 6.26  1.70 - 7.00 10*3/mm3 Final   • Lymphocytes, Absolute 09/22/2021 1.35  0.70 - 3.10 10*3/mm3 Final   • Monocytes, Absolute 09/22/2021 0.86  0.10 - 0.90 10*3/mm3 Final   • Eosinophils, Absolute 09/22/2021 0.19  0.00 - 0.40 10*3/mm3 Final   • Basophils, Absolute 09/22/2021 0.06  0.00 - 0.20 10*3/mm3 Final   • Immature Grans, Absolute 09/22/2021 0.04  0.00 - 0.05 10*3/mm3 Final   • nRBC 09/22/2021 0.0  0.0 - 0.2 /100 WBC Final   Infusion on 09/08/2021   Component Date Value Ref Range Status   • Glucose 09/08/2021 85  74  - 124 mg/dL Final   • BUN 09/08/2021 14  6 - 20 mg/dL Final   • Creatinine 09/08/2021 1.21* 0.60 - 1.10 mg/dL Final   • Sodium 09/08/2021 140  134 - 145 mmol/L Final   • Potassium 09/08/2021 4.9* 3.5 - 4.7 mmol/L Final   • Chloride 09/08/2021 104  98 - 107 mmol/L Final   • CO2 09/08/2021 27.1  22.0 - 29.0 mmol/L Final   • Calcium 09/08/2021 9.3  8.5 - 10.2 mg/dL Final   • Total Protein 09/08/2021 6.6  6.3 - 8.0 g/dL Final   • Albumin 09/08/2021 4.20  3.50 - 5.20 g/dL Final   • ALT (SGPT) 09/08/2021 10  0 - 33 U/L Final   • AST (SGOT) 09/08/2021 16  0 - 32 U/L Final   • Alkaline Phosphatase 09/08/2021 92  38 - 116 U/L Final   • Total Bilirubin 09/08/2021 <0.2* 0.2 - 1.2 mg/dL Final   • eGFR Non  Amer 09/08/2021 46* >60 mL/min/1.73 Final   • Globulin 09/08/2021 2.4  1.8 - 3.5 gm/dL Final   • A/G Ratio 09/08/2021 1.8  1.1 - 2.4 g/dL Final   • BUN/Creatinine Ratio 09/08/2021 11.6  7.3 - 30.0 Final   • Anion Gap 09/08/2021 8.9  5.0 - 15.0 mmol/L Final   • WBC 09/08/2021 7.14  3.40 - 10.80 10*3/mm3 Final   • RBC 09/08/2021 3.90  3.77 - 5.28 10*6/mm3 Final   • Hemoglobin 09/08/2021 11.1* 12.0 - 15.9 g/dL Final   • Hematocrit 09/08/2021 36.2  34.0 - 46.6 % Final   • MCV 09/08/2021 92.8  79.0 - 97.0 fL Final   • MCH 09/08/2021 28.5  26.6 - 33.0 pg Final   • MCHC 09/08/2021 30.7* 31.5 - 35.7 g/dL Final   • RDW 09/08/2021 17.2* 12.3 - 15.4 % Final   • RDW-SD 09/08/2021 58.6* 37.0 - 54.0 fl Final   • MPV 09/08/2021 8.5  6.0 - 12.0 fL Final   • Platelets 09/08/2021 228  140 - 450 10*3/mm3 Final   • Neutrophil % 09/08/2021 67.5  42.7 - 76.0 % Final   • Lymphocyte % 09/08/2021 16.8* 19.6 - 45.3 % Final   • Monocyte % 09/08/2021 11.8  5.0 - 12.0 % Final   • Eosinophil % 09/08/2021 2.7  0.3 - 6.2 % Final   • Basophil % 09/08/2021 0.8  0.0 - 1.5 % Final   • Immature Grans % 09/08/2021 0.4  0.0 - 0.5 % Final   • Neutrophils, Absolute 09/08/2021 4.82  1.70 - 7.00 10*3/mm3 Final   • Lymphocytes, Absolute 09/08/2021 1.20  0.70 -  3.10 10*3/mm3 Final   • Monocytes, Absolute 09/08/2021 0.84  0.10 - 0.90 10*3/mm3 Final   • Eosinophils, Absolute 09/08/2021 0.19  0.00 - 0.40 10*3/mm3 Final   • Basophils, Absolute 09/08/2021 0.06  0.00 - 0.20 10*3/mm3 Final   • Immature Grans, Absolute 09/08/2021 0.03  0.00 - 0.05 10*3/mm3 Final   • nRBC 09/08/2021 0.0  0.0 - 0.2 /100 WBC Final   Infusion on 08/25/2021   Component Date Value Ref Range Status   • Glucose 08/25/2021 115  74 - 124 mg/dL Final   • BUN 08/25/2021 18  6 - 20 mg/dL Final   • Creatinine 08/25/2021 1.15* 0.60 - 1.10 mg/dL Final   • Sodium 08/25/2021 138  134 - 145 mmol/L Final   • Potassium 08/25/2021 4.8* 3.5 - 4.7 mmol/L Final   • Chloride 08/25/2021 102  98 - 107 mmol/L Final   • CO2 08/25/2021 25.8  22.0 - 29.0 mmol/L Final   • Calcium 08/25/2021 9.4  8.5 - 10.2 mg/dL Final   • Total Protein 08/25/2021 6.7  6.3 - 8.0 g/dL Final   • Albumin 08/25/2021 4.20  3.50 - 5.20 g/dL Final   • ALT (SGPT) 08/25/2021 11  0 - 33 U/L Final   • AST (SGOT) 08/25/2021 16  0 - 32 U/L Final   • Alkaline Phosphatase 08/25/2021 99  38 - 116 U/L Final   • Total Bilirubin 08/25/2021 <0.2* 0.2 - 1.2 mg/dL Final   • eGFR Non  Amer 08/25/2021 48* >60 mL/min/1.73 Final   • Globulin 08/25/2021 2.5  1.8 - 3.5 gm/dL Final   • A/G Ratio 08/25/2021 1.7  1.1 - 2.4 g/dL Final   • BUN/Creatinine Ratio 08/25/2021 15.7  7.3 - 30.0 Final   • Anion Gap 08/25/2021 10.2  5.0 - 15.0 mmol/L Final   • WBC 08/25/2021 7.76  3.40 - 10.80 10*3/mm3 Final   • RBC 08/25/2021 3.82  3.77 - 5.28 10*6/mm3 Final   • Hemoglobin 08/25/2021 10.8* 12.0 - 15.9 g/dL Final   • Hematocrit 08/25/2021 35.6  34.0 - 46.6 % Final   • MCV 08/25/2021 93.2  79.0 - 97.0 fL Final   • MCH 08/25/2021 28.3  26.6 - 33.0 pg Final   • MCHC 08/25/2021 30.3* 31.5 - 35.7 g/dL Final   • RDW 08/25/2021 20.2* 12.3 - 15.4 % Final   • RDW-SD 08/25/2021 67.4* 37.0 - 54.0 fl Final   • MPV 08/25/2021 9.1  6.0 - 12.0 fL Final   • Platelets 08/25/2021 255  140 - 450  10*3/mm3 Final   • Neutrophil % 08/25/2021 62.9  42.7 - 76.0 % Final   • Lymphocyte % 08/25/2021 17.3* 19.6 - 45.3 % Final   • Monocyte % 08/25/2021 11.6  5.0 - 12.0 % Final   • Eosinophil % 08/25/2021 6.4* 0.3 - 6.2 % Final   • Basophil % 08/25/2021 0.9  0.0 - 1.5 % Final   • Immature Grans % 08/25/2021 0.9* 0.0 - 0.5 % Final   • Neutrophils, Absolute 08/25/2021 4.88  1.70 - 7.00 10*3/mm3 Final   • Lymphocytes, Absolute 08/25/2021 1.34  0.70 - 3.10 10*3/mm3 Final   • Monocytes, Absolute 08/25/2021 0.90  0.10 - 0.90 10*3/mm3 Final   • Eosinophils, Absolute 08/25/2021 0.50* 0.00 - 0.40 10*3/mm3 Final   • Basophils, Absolute 08/25/2021 0.07  0.00 - 0.20 10*3/mm3 Final   • Immature Grans, Absolute 08/25/2021 0.07* 0.00 - 0.05 10*3/mm3 Final   • nRBC 08/25/2021 0.0  0.0 - 0.2 /100 WBC Final        US Breast Left Limited    Result Date: 8/24/2021  1.  Decreased size of a post biopsy hematoma at the site of biopsy-proven malignancy in the right breast with residual calcifications, as above, which have decreased in number but are greater in extent, likely due to the superimposed hematoma. Continued oncologic and surgical management are recommended. 2.  A focal asymmetry at a site of biopsy-proven malignancy in the left breast is slightly larger, which may be due to tumor progression versus superimposed postbiopsy changes. Exact measurements on ultrasound are difficult to determine. Continued oncologic and surgical management are recommended. Adjacent calcifications have increased in number and should be included at the time of surgical excision.  BI-RADS Category 6: Known biopsy-proven malignancy  This report was finalized on 8/24/2021 1:08 PM by Dr. Mary Goetz M.D.      Mammo Diagnostic Digital Tomosynthesis Bilateral With CAD    Result Date: 8/24/2021  1.  Decreased size of a post biopsy hematoma at the site of biopsy-proven malignancy in the right breast with residual calcifications, as above, which have decreased in  number but are greater in extent, likely due to the superimposed hematoma. Continued oncologic and surgical management are recommended. 2.  A focal asymmetry at a site of biopsy-proven malignancy in the left breast is slightly larger, which may be due to tumor progression versus superimposed postbiopsy changes. Exact measurements on ultrasound are difficult to determine. Continued oncologic and surgical management are recommended. Adjacent calcifications have increased in number and should be included at the time of surgical excision.  BI-RADS Category 6: Known biopsy-proven malignancy  This report was finalized on 8/24/2021 1:08 PM by Dr. Mary Goetz M.D.       CBC 8/25/2021-WBC 7.76, hemoglobin 10.8, platelets 255    8/24/2021 bilateral diagnostic mammogram and bilateral ultrasound-images independently reviewed and interpreted by me  In the left breast there is a 1 cm focal asymmetry.  Previously measured 0.8 cm on 3/1/2021.  1.4 cm group of calcifications adjacent to the superior lateral margin of the biopsy clip which are new.    In the right breast there is a 3.5 cm postbiopsy hematoma.  This has decreased in size.  Loosely grouped residual calcifications measuring up to 2.5 cm.  These have decreased in number compared to 3/23/2021.  However the extent measures slightly larger could be due to superimposed hematoma.    Ultrasound  At 10 o'clock position of the left breast, 6 cm from the nipple there is an echogenic focus from the biopsy clip and the malignancy measures 0.4 x 0.3 cm.    Assessment/Plan      *Bilateral breast cancer  · Invasive ductal carcinoma in both right and left breast and lesions measure under 1 cm.  No palpable lymphadenopathy although unsure if this has been evaluated by an axillary ultrasound.  No abnormal axillary lymphadenopathy noted on PET.  · Most likely clinical T1b N0 M0, both cancers were noted to be grade 2, ER/NY positive and HER-2 negative and Ki-67 less than 15%  VATS on  4/30/2021  Biopsy confirms adenocarcinoma of pulmonary primary  Discussed with Dr. Molina about delaying breast surgery and she is in agreement  Darted on neoadjuvant anastrozole  · Patient experienced significant adverse effects with anastrozole  · Treatment changed to letrozole   · Axillary ultrasound 4/20/2021 -negative for any axillary lymphadenopathy  · Started letrozole in May 2021  · She continues on letrozole without any problems.  · 8/24/2021 bilateral diagnostic mammogram and ultrasound.  In the left breast the tumor now measures 1 cm on mammogram as opposed to 0.8 cm in March.  Right breast calcifications also noted to be in a large extent although the number seems to be decreased.  Hematoma has decreased in size.  · Since she has completed concurrent chemoradiation and questionable increase in size of the left breast lesion I think it would be reasonable to proceed with surgery if Dr. Molina thinks that this is amenable.  · She reschedule her appointment with Dr. Molina.     *Adenocarcinoma of the left lung  · Most likely stage T1 N2 M0, stage III  · MRI of the thoracic spine ordered to further evaluate the abnormality seen on PET  · MRI of the brain negative   · Given that she is only 55 and fairly good performance status I discussed with her by with concurrent chemoradiation with cisplatin and Alimta every 3 weeks followed by durvalumab.  · Adverse effects including but not limited to myelosuppression, increased risk of infections, nausea, vomiting, renal dysfunction, ototoxicity, neurotoxicity have been discussed at length.  · MRI of the thoracic spine performed 5/24/2021 shows no evidence of metastatic disease in the thoracic spine however in L1 there is a 7 mm lesion which is indeterminate.  A lumbar spine MRI has been recommended.  · Discussed her case with Dr. Oates and he feels like MRI of the lumbar spine may also show that this lesion is indeterminate.  · Discussed the same with the patient  and her .  · Even if this is metastatic disease this might be the only lesion of metastatic disease and then would consider this oligometastatic disease.  · Therefore plan to proceed with concurrent chemoradiation as scheduled.  · cisplatin and Alimta, C1D1 5/25/2021  · Day 1 of radiation 5/25/2021  · 5/27/2021-MRI of the lumbar spine-there is a 6 x 5 x 6 mm enhancing lesion in the left posterior body of L1 vertebra.  This is considered indeterminate.  Follow-up recommended.  Degenerative changes noted at L5-S1  · 7/7/2021, cycle 3 cisplatin Alimta  · 7/12/2021-radiation completed  · PET/CT 8/6/2021 with good response to chemoradiation.  · Continue with durvalumab  · CBC and CMP reviewed and stable to proceed with durvalumab.    *Lumbar spine lesion-L1 lesion indeterminate.  MRI of the lumbar spine spine images independently reviewed and interpreted by me in detail summarized above.  Plan to repeat MRI in 4 months.  This will be due in September 2021  · MRI spine rescheduled  · Patient had panic attack when she went in to get her MRI on 9/14/2021, Valium prescribed to help with that.    *Bilateral breast cancer-no family history of breast cancer but given synchronous breast cancer genetic testing has been sent.  Genetic testing with a variant of unknown significance.    *Right hand numbness and difficulty with grasping objects  · Previously had history of carpal tunnel requiring repair.  · She is currently on letrozole which could cause worsening of the carpal tunnel  · Gabapentin dose will be increased to 600 mg 3 times daily  · She will also be referred to hand surgery for further evaluation.  · She now reports bilateral upper extremity numbness.  · Continue gabapentin    *Anxiety-she is on several different medications currently.  Referral has been made to supportive oncology clinic today.  She is requesting an increase in the dose of the Xanax.  Okay to increase the dose to 1 mg nightly to help with  sleep.  Further management of these medications will be deferred to supportive oncology once she has established care with them.  He has been started on gabapentin.  Continue follow-up with supportive oncology  Had a panic attack at MRI  Valium has been prescribed today    *Left-sided chest wall pain-secondary to VATS.  Most likely neuropathic.  Continue gabapentin and Norco as needed.  She ran out of gabapentin and experienced leg cramps.    Continue gabapentin      *Hypertension-continue lisinopril.  Blood pressure 117/81    *Smoking-she has not smoked in the past 2 days.  Trying to quit smoking.  Congratulated on her efforts.  Not addressed today    *GERD  · 7/7/2021, patient complains of reflux despite the use of Prilosec 20 mg twice daily.  We will send her a prescription for Carafate slurry 4 times daily.  · She has not quite started using the Carafate.  · Continue Prilosec and recommend using Carafate  · PET/CT shows duodenitis      *Port not returning blood  · Patient reports the last treatment that difficulty getting blood return.  Today nursing was unable to get blood return and have placed Activase without success.  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  The end of the catheter has backed up into the innominate vein.  This was discussed with Dr. Snell and there is no need for additional imaging at this time.  WE WILL NOT USE THE PORT.   · She has been started on Eliquis.  Completed a week of 10 mg twice daily and now on 5 mg.  Continue the same  · We will obtain imaging to see if the disease is responded.  If patient wishes to get the port out then we will continue with anticoagulation for 3 months and remove the port.  However if she wishes to keep the port then a port revision will have to be performed.    · Continue Eliquis and remove  port after 3 months  · Continue Eliquis at this time.  Port can be removed at the time of breast surgery    PLAN:  1. Continue durvalumab  2. Continue Eliquis 5 mg twice daily  3. Schedule follow-up with Dr. Molina to plan breast surgery  4. Continue letrozole 2.5 mg daily.  We will reevaluate the need to change to a different AI depending on the final pathology after surgery  5. Continue omeprazole 20mg twice daily.  6. Continue Carafate 4 times daily.   7. MRI lumbar spine reschedule  8. Follow-up after MRI of the spine    Patient is on treatment requiring close monitoring for toxicities.      Salma Snell MD

## 2021-09-24 LAB — CREAT BLDA-MCNC: 0.9 MG/DL (ref 0.6–1.3)

## 2021-10-01 ENCOUNTER — HOSPITAL ENCOUNTER (OUTPATIENT)
Dept: MRI IMAGING | Facility: HOSPITAL | Age: 56
Discharge: HOME | End: 2021-10-01
Admitting: INTERNAL MEDICINE

## 2021-10-01 DIAGNOSIS — Z17.0 MALIGNANT NEOPLASM OF UPPER-OUTER QUADRANT OF RIGHT BREAST IN FEMALE, ESTROGEN RECEPTOR POSITIVE (HCC): ICD-10-CM

## 2021-10-01 DIAGNOSIS — C50.411 MALIGNANT NEOPLASM OF UPPER-OUTER QUADRANT OF RIGHT BREAST IN FEMALE, ESTROGEN RECEPTOR POSITIVE (HCC): ICD-10-CM

## 2021-10-01 DIAGNOSIS — Z17.0 MALIGNANT NEOPLASM OF UPPER-INNER QUADRANT OF LEFT BREAST IN FEMALE, ESTROGEN RECEPTOR POSITIVE (HCC): ICD-10-CM

## 2021-10-01 DIAGNOSIS — C50.212 MALIGNANT NEOPLASM OF UPPER-INNER QUADRANT OF LEFT BREAST IN FEMALE, ESTROGEN RECEPTOR POSITIVE (HCC): ICD-10-CM

## 2021-10-01 DIAGNOSIS — G56.00 CARPAL TUNNEL SYNDROME, UNSPECIFIED LATERALITY: ICD-10-CM

## 2021-10-01 LAB — CREAT BLDA-MCNC: 2.3 MG/DL (ref 0.6–1.3)

## 2021-10-01 PROCEDURE — 82565 ASSAY OF CREATININE: CPT

## 2021-10-01 PROCEDURE — 72148 MRI LUMBAR SPINE W/O DYE: CPT

## 2021-10-02 NOTE — PROGRESS NOTES
SURGERY  Holli Patel   1965  10/04/21    Chief Complaint: Bilateral breast cancers    HPI    Ms. Patel is a very nice 56 y.o. female who presented in April, 21 with unfortunately bilateral breast cancers detected by screening, but simultaneously a stage III lung cancer, T1N2M0, left upper lobe.  She underwent concurrent radiation and chemo with PET/CT 8/21 with good response.  Her breast cancer appears to be Stage I.  She is here now for possible proceeding with her breast cancer.    She had a port placed by Dr Kerr that subsequently backed up into the innominate and was accompanied by a clot.  She was started on eliquis with plans for removal of port at time of breast surgery.  She is on letrozole.     Her latest MRI was without contrast, she being found to have a creatinine of 2.3.  Unfortunately, she had a recent PET where an indeterminate lesion in L1 has grown from 6 mm to 14 mm, suggestive of mets.  Of course, the question is whether this is from the lung or breast.     Breast Imaging suggests the breast lesions have not receded during her treatment and have possibly progressed:    Screening 3/2/21 vs follow up 8/21:  - right lateral posterior 1/3 with micro calcifications 3/21, now with decreased post bx hematoma, area of calcifications 2.5 vs previously 2, but less dense.  [place] tie clip present  - left focal asymmetry middle upper inner quadrant 1/3, previously 8 mm, now 10, with 1.4 cm group of calcifications adjacent to the superior and lateral margin of the clip, some of which are new.      Diagnostic mammography 3/23/21  - cluster micro calcifications middle third lateral  - persistence of asymmetry.    US 3/23/21 vs 8/21  - left 10:00 4 mm irregular hypoechoic lesion vs 4 mm mass at 10:00.    Stereotactic and US guided bx 4/7/21  - right breast 10:00 bx with bowtie clip with 4.3 cm hematoma  - left breast 10:00 bx with bowtie clip.    Pathology  - right invasive ductal carcinoma,  [place], DCIS, ERPR+ her 2 melinda -  - left invasive ductal carcinoma, [place] II. ERPR+ her 2 melinda equivocal.  FISH -     Unfortunate for her situation is the fact that her mother has dementia and she has a handicapped sister who has the mentality of about a 13-year-old.  Happily the sister can help with the mother, sitting with her, but our patient has a responsibility to take care of them both.    She has been a current every day smoker, and has smoked for 40 years, pack a day.  She does not have any family history of breast cancer, and is at least a 40 D cup size, making mastectomy less attractive, the smoking making her not really an immediate reconstruction candidate.      Past Medical History:   Diagnosis Date   • Anxiety    • Dyspnea on exertion    • SHAYY (generalized anxiety disorder)     RELATED HIGH BLOOD PRESSURE   • GERD (gastroesophageal reflux disease)    • High blood pressure     ANXIETY RELATED   • Hyperlipidemia    • Hypothyroidism    • Insomnia    • Lung cancer (HCC)    • Lung nodule    • Malignant neoplasm of upper-outer quadrant of right breast in female, estrogen receptor positive (HCC) 4/13/2021   • Migraine    • PONV (postoperative nausea and vomiting)      Past Surgical History:   Procedure Laterality Date   • BREAST BIOPSY Bilateral 04/07/2021    Right Breast 10 o'clock & Left breast 10 o'clock 6 cm from nipple, [facility]   • CLOSED REDUCTION HIP DISLOCATION Left 4/30/2021    Procedure: BRONCHOSCOPY, LEFT VIDEO ASSITED THORACOSCOPY, ROBOTIC ASSSITED MEDIASTINAL LYMPHADENECTOMY WITH INTERCOSTAL NERVE BLOCKS;  Surgeon: Raphael Kerr III, MD;  Location: [facility];  Service: DaVinci;  Laterality: Left;   • COLONOSCOPY     • VENOUS ACCESS DEVICE (PORT) INSERTION Right 5/20/2021    Procedure: INSERTION VENOUS ACCESS DEVICE;  Surgeon: Raphael Kerr III, MD;  Location: [facility] OR;  Service: Thoracic;  Laterality: Right;   • WRIST SURGERY Right      Family History   Problem Relation  Age of Onset   • Cancer Father         LYMPHATIC   • Alcohol abuse Brother    • Colon cancer Maternal Grandmother    • Malig Hyperthermia Neg Hx      Social History     Socioeconomic History   • Marital status:      Spouse name: Not on file   • Number of children: Not on file   • Years of education: Not on file   • Highest education level: Not on file   Tobacco Use   • Smoking status: Current Every Day Smoker     Packs/day: 1.00     Types: Electronic Cigarette, Cigarettes     Start date: 4/14/1981   • Smokeless tobacco: Never Used   • Tobacco comment: ABT 15 CIGARETTES DAILY   Vaping Use   • Vaping Use: Some days   • Substances: Nicotine, Flavoring   • Devices: Disposable   Substance and Sexual Activity   • Alcohol use: Never   • Drug use: Never   • Sexual activity: Yes     Partners: Male         Current Outpatient Medications:   •  ALPRAZolam (XANAX) 0.5 MG tablet, Take 1 tablet by mouth 2 (Two) Times a Day As Needed for Anxiety. for anxiety, Disp: 45 tablet, Rfl: 2  •  apixaban (Eliquis) 5 MG tablet tablet, Take 1 tablet by mouth Every 12 (Twelve) Hours., Disp: 60 tablet, Rfl: 5  •  buPROPion XL (WELLBUTRIN XL) 300 MG 24 hr tablet, Take 1 tablet by mouth Daily., Disp: 30 tablet, Rfl: 5  •  dexamethasone (DECADRON) 4 MG tablet, Take 1 tablet oral twice a day for 3 consecutive days beginning the day before chemotherapy and continue for 6 doses.Take with food., Disp: 6 tablet, Rfl: 3  •  diazePAM (VALIUM) 5 MG tablet, Take 1 tab 30 minutes prior to MRI, Disp: 1 tablet, Rfl: 0  •  FLUoxetine (PROzac) 40 MG capsule, Take 1 capsule by mouth Daily., Disp: 90 capsule, Rfl: 1  •  folic acid (FOLVITE) 1 MG tablet, Take 1 tablet by mouth Daily. Start 7 days prior to chemotherapy until at least 3 weeks after all chemotherapy., Disp: 30 tablet, Rfl: 3  •  gabapentin (NEURONTIN) 300 MG capsule, One in am, 2 in pm, Disp: 90 capsule, Rfl: 0  •  gabapentin (NEURONTIN) 300 MG capsule, Take 2 capsules by mouth 3 (Three) Times  "a Day. take 1 capsule by mouth every morning and take 2 capsules by mouth every night at bedtime, Disp: 180 capsule, Rfl: 0  •  letrozole (FEMARA) 2.5 MG tablet, Take 1 tablet by mouth Daily., Disp: 30 tablet, Rfl: 3  •  levothyroxine (SYNTHROID, LEVOTHROID) 25 MCG tablet, Take 1 tablet by mouth Daily., Disp: 90 tablet, Rfl: 1  •  lisinopril (PRINIVIL,ZESTRIL) 20 MG tablet, Take 1 tablet by mouth Daily., Disp: 90 tablet, Rfl: 1  •  omeprazole (PrilOSEC) 20 MG capsule, Take 1 capsule by mouth 2 (two) times a day., Disp: 180 capsule, Rfl: 0  •  ondansetron (ZOFRAN) 8 MG tablet, Take 1 tablet by mouth 3 (Three) Times a Day As Needed for Nausea or Vomiting., Disp: 30 tablet, Rfl: 5  •  prochlorperazine (COMPAZINE) 10 MG tablet, TAKE ONE TABLET BY MOUTH EVERY 8 HOURS AS NEEDED FOR NAUSEA AND VOMITING, Disp: 30 tablet, Rfl: 2  •  propranolol LA (Inderal LA) 60 MG 24 hr capsule, Take 1 capsule by mouth Daily. (Patient taking differently: Take 60 mg by mouth Every Night.), Disp: 90 capsule, Rfl: 1  •  rizatriptan (MAXALT) 10 MG tablet, TAKE ONE TABLET BY MOUTH AT ONSET OF HEADACHE; MAY REPEAT ONE TABLET IN 2 HOURS IF NEEDED., Disp: 12 tablet, Rfl: 0  •  Senexon-S 8.6-50 MG per tablet, TAKE TWO TABLETS BY MOUTH DAILY, Disp: 60 tablet, Rfl: 1  •  simvastatin (ZOCOR) 20 MG tablet, Take 1 tablet by mouth Daily. (Patient taking differently: Take 20 mg by mouth Every Night.), Disp: 90 tablet, Rfl: 1  •  sucralfate (CARAFATE) 1 g tablet, Take 1 tablet by mouth 4 (Four) Times a Day., Disp: 120 tablet, Rfl: 3    No Known Allergies    Review of Systems  Positive for shortness of breath nervousness and anxiety.  All other systems reviewed and negative  Vitals:    10/04/21 1614   Weight: 84.3 kg (185 lb 12.8 oz)   Height: 160 cm (62.99\")       PHYSICAL EXAM:    Ht 160 cm (62.99\")   Wt 84.3 kg (185 lb 12.8 oz)   LMP  (LMP Unknown)   BMI 32.92 kg/m²   Body mass index is 32.92 kg/m².    Constitutional: well developed, well nourished, " very pleasant, especially considering the situation, appears stated age  Eyes: sclera nonicteric, conjunctiva not injected   ENMT: Hearing intact, trachea midline, dentitia not seen with mask in place  CVS: RRR, no murmur, peripheral edema not present  Respiratory: CTA, normal respiratory effort   Gastrointestinal: no hepatosplenomegaly, abdomen soft, nontender  Musculoskeletal: gait normal, muscle mass normal  Skin: warm and dry, no rashes visible.  Breast skin in relatively good condition  Neurological: awake and alert, seems to have reasonable capacity for understanding for medical decision making  Psychiatric: appears to have reasonable judgement   Lymphatics: no cervical adenopathy or axillary adenopathy.  Breasts: Right breast with tethering at the lateral breast wall, suspected either advancement of the tumor or more likely tethering due to her large hematoma.  Left breast with small incisional scar in no palpable hematoma or mass    Radiographic Findings: As above.  We reviewed findings on the PET    Lab: Pathology as above, ER/ND positive HER-2/melinda negative both sides    Pamphlet reviewed: Breast cancer    IMPRESSION:  · Calcifications right breast, now enlarged to 2.5 cm, but less dense.  Large hematoma at the site originally, still 3.5 cm.  Invasive ductal carcinoma [place] 2, ER/ND positive HER-2/melinda negative  · Asymmetry left breast 10:00, invasive ductal carcinoma, ER/ND positive HER-2/melinda negative by FISH, measured previously 8 mm, now 10 mm..  · Clinical stage III left lung cancer, S/P chemoradiation.  Left upper lobe.   · Questionable metastatic disease to L1, enlarged while on treatment, type unknown.  · Clinical stage I bilateral breast cancer if in fact the PET scan reveals a lung primary rather than a metastatic situation.  I favor that this is likely 2 separate breast primaries and one lung primary all at the same time  · Smoker   · Anxiety on Xanax.  Bad attack with MRI without valium, 5  mg, being barely sufficient.  · Social difficulty and that she cares for both her demented mother and her handicapped sister    PLAN:  · With her radiation to the lung, i am now somewhat concerned about giving bilateral breast radiation.  I would want to have bilateral breast MRI to determine scope of the breast cancer, particularly on the right with calcifications over a larger area.  · Phoned Dr Snell to alert to the L1 issue.  This needs to be resolved before we proceed, meaning that if it is metastatic lung disease, we should perhaps hold on treating the breast cancer.   · Genetics visit and genetic testing  · Discussed the need for sentinel node biopsy bilateral,  logistics of such.    Breanna Molina MD  10/04/21      Greater than 35 mins spent with over half in counselling    In order to provide a more personal and interactive patient experience as well as improve efficiency, this note was started prior to the office visit.

## 2021-10-04 ENCOUNTER — OFFICE VISIT (OUTPATIENT)
Dept: SURGERY | Facility: CLINIC | Age: 56
End: 2021-10-04

## 2021-10-04 VITALS — HEIGHT: 63 IN | WEIGHT: 185.8 LBS | BODY MASS INDEX: 32.92 KG/M2

## 2021-10-04 DIAGNOSIS — C50.411 MALIGNANT NEOPLASM OF UPPER-OUTER QUADRANT OF RIGHT BREAST IN FEMALE, ESTROGEN RECEPTOR POSITIVE (HCC): Primary | ICD-10-CM

## 2021-10-04 DIAGNOSIS — Z17.0 MALIGNANT NEOPLASM OF UPPER-INNER QUADRANT OF LEFT BREAST IN FEMALE, ESTROGEN RECEPTOR POSITIVE (HCC): ICD-10-CM

## 2021-10-04 DIAGNOSIS — C34.12 PRIMARY ADENOCARCINOMA OF UPPER LOBE OF LEFT LUNG (HCC): ICD-10-CM

## 2021-10-04 DIAGNOSIS — Z17.0 MALIGNANT NEOPLASM OF UPPER-OUTER QUADRANT OF RIGHT BREAST IN FEMALE, ESTROGEN RECEPTOR POSITIVE (HCC): Primary | ICD-10-CM

## 2021-10-04 DIAGNOSIS — C50.212 MALIGNANT NEOPLASM OF UPPER-INNER QUADRANT OF LEFT BREAST IN FEMALE, ESTROGEN RECEPTOR POSITIVE (HCC): ICD-10-CM

## 2021-10-04 LAB — CREAT BLDA-MCNC: 2.1 MG/DL (ref 0.6–1.3)

## 2021-10-04 PROCEDURE — 99214 OFFICE O/P EST MOD 30 MIN: CPT | Performed by: SURGERY

## 2021-10-04 RX ORDER — RIZATRIPTAN BENZOATE 10 MG/1
TABLET ORAL
Qty: 12 TABLET | Refills: 0 | Status: SHIPPED | OUTPATIENT
Start: 2021-10-04 | End: 2021-11-02

## 2021-10-06 ENCOUNTER — INFUSION (OUTPATIENT)
Dept: ONCOLOGY | Facility: HOSPITAL | Age: 56
End: 2021-10-06

## 2021-10-06 ENCOUNTER — OFFICE VISIT (OUTPATIENT)
Dept: ONCOLOGY | Facility: CLINIC | Age: 56
End: 2021-10-06

## 2021-10-06 ENCOUNTER — LAB (OUTPATIENT)
Dept: ONCOLOGY | Facility: HOSPITAL | Age: 56
End: 2021-10-06

## 2021-10-06 VITALS
DIASTOLIC BLOOD PRESSURE: 79 MMHG | TEMPERATURE: 97.1 F | SYSTOLIC BLOOD PRESSURE: 114 MMHG | WEIGHT: 185.5 LBS | HEART RATE: 108 BPM | BODY MASS INDEX: 32.87 KG/M2 | HEIGHT: 63 IN | RESPIRATION RATE: 16 BRPM | OXYGEN SATURATION: 94 %

## 2021-10-06 DIAGNOSIS — C34.12 PRIMARY ADENOCARCINOMA OF UPPER LOBE OF LEFT LUNG (HCC): Primary | ICD-10-CM

## 2021-10-06 DIAGNOSIS — S32.009A: ICD-10-CM

## 2021-10-06 DIAGNOSIS — S34.109A: ICD-10-CM

## 2021-10-06 DIAGNOSIS — C34.12 PRIMARY ADENOCARCINOMA OF UPPER LOBE OF LEFT LUNG (HCC): ICD-10-CM

## 2021-10-06 LAB
ALBUMIN SERPL-MCNC: 4.3 G/DL (ref 3.5–5.2)
ALBUMIN/GLOB SERPL: 1.3 G/DL (ref 1.1–2.4)
ALP SERPL-CCNC: 107 U/L (ref 38–116)
ALT SERPL W P-5'-P-CCNC: 10 U/L (ref 0–33)
ANION GAP SERPL CALCULATED.3IONS-SCNC: 10.1 MMOL/L (ref 5–15)
AST SERPL-CCNC: 15 U/L (ref 0–32)
BASOPHILS # BLD AUTO: 0.06 10*3/MM3 (ref 0–0.2)
BASOPHILS NFR BLD AUTO: 0.6 % (ref 0–1.5)
BILIRUB SERPL-MCNC: <0.2 MG/DL (ref 0.2–1.2)
BUN SERPL-MCNC: 14 MG/DL (ref 6–20)
BUN/CREAT SERPL: 14.3 (ref 7.3–30)
CALCIUM SPEC-SCNC: 9.7 MG/DL (ref 8.5–10.2)
CHLORIDE SERPL-SCNC: 99 MMOL/L (ref 98–107)
CO2 SERPL-SCNC: 27.9 MMOL/L (ref 22–29)
CREAT SERPL-MCNC: 0.98 MG/DL (ref 0.6–1.1)
DEPRECATED RDW RBC AUTO: 45 FL (ref 37–54)
EOSINOPHIL # BLD AUTO: 0.24 10*3/MM3 (ref 0–0.4)
EOSINOPHIL NFR BLD AUTO: 2.5 % (ref 0.3–6.2)
ERYTHROCYTE [DISTWIDTH] IN BLOOD BY AUTOMATED COUNT: 13.2 % (ref 12.3–15.4)
GFR SERPL CREATININE-BSD FRML MDRD: 58 ML/MIN/1.73
GLOBULIN UR ELPH-MCNC: 3.2 GM/DL (ref 1.8–3.5)
GLUCOSE SERPL-MCNC: 98 MG/DL (ref 74–124)
HCT VFR BLD AUTO: 35.8 % (ref 34–46.6)
HGB BLD-MCNC: 11 G/DL (ref 12–15.9)
IMM GRANULOCYTES # BLD AUTO: 0.08 10*3/MM3 (ref 0–0.05)
IMM GRANULOCYTES NFR BLD AUTO: 0.8 % (ref 0–0.5)
LYMPHOCYTES # BLD AUTO: 1.1 10*3/MM3 (ref 0.7–3.1)
LYMPHOCYTES NFR BLD AUTO: 11.5 % (ref 19.6–45.3)
MCH RBC QN AUTO: 28.7 PG (ref 26.6–33)
MCHC RBC AUTO-ENTMCNC: 30.7 G/DL (ref 31.5–35.7)
MCV RBC AUTO: 93.5 FL (ref 79–97)
MONOCYTES # BLD AUTO: 1.16 10*3/MM3 (ref 0.1–0.9)
MONOCYTES NFR BLD AUTO: 12.2 % (ref 5–12)
NEUTROPHILS NFR BLD AUTO: 6.9 10*3/MM3 (ref 1.7–7)
NEUTROPHILS NFR BLD AUTO: 72.4 % (ref 42.7–76)
NRBC BLD AUTO-RTO: 0 /100 WBC (ref 0–0.2)
PLATELET # BLD AUTO: 289 10*3/MM3 (ref 140–450)
PMV BLD AUTO: 8.6 FL (ref 6–12)
POTASSIUM SERPL-SCNC: 4.7 MMOL/L (ref 3.5–4.7)
PROT SERPL-MCNC: 7.5 G/DL (ref 6.3–8)
RBC # BLD AUTO: 3.83 10*6/MM3 (ref 3.77–5.28)
SODIUM SERPL-SCNC: 137 MMOL/L (ref 134–145)
WBC # BLD AUTO: 9.54 10*3/MM3 (ref 3.4–10.8)

## 2021-10-06 PROCEDURE — 85025 COMPLETE CBC W/AUTO DIFF WBC: CPT

## 2021-10-06 PROCEDURE — 25010000002 DURVALUMAB 50 MG/ML SOLUTION 10 ML VIAL: Performed by: INTERNAL MEDICINE

## 2021-10-06 PROCEDURE — 36415 COLL VENOUS BLD VENIPUNCTURE: CPT

## 2021-10-06 PROCEDURE — G2212 PROLONG OUTPT/OFFICE VIS: HCPCS | Performed by: INTERNAL MEDICINE

## 2021-10-06 PROCEDURE — 36591 DRAW BLOOD OFF VENOUS DEVICE: CPT

## 2021-10-06 PROCEDURE — 96413 CHEMO IV INFUSION 1 HR: CPT

## 2021-10-06 PROCEDURE — 80053 COMPREHEN METABOLIC PANEL: CPT

## 2021-10-06 PROCEDURE — 99215 OFFICE O/P EST HI 40 MIN: CPT | Performed by: INTERNAL MEDICINE

## 2021-10-06 PROCEDURE — 25010000002 DURVALUMAB 50 MG/ML SOLUTION 2.4 ML VIAL: Performed by: INTERNAL MEDICINE

## 2021-10-06 RX ORDER — SODIUM CHLORIDE 9 MG/ML
250 INJECTION, SOLUTION INTRAVENOUS ONCE
Status: COMPLETED | OUTPATIENT
Start: 2021-10-06 | End: 2021-10-06

## 2021-10-06 RX ORDER — SODIUM CHLORIDE 9 MG/ML
250 INJECTION, SOLUTION INTRAVENOUS ONCE
Status: CANCELLED | OUTPATIENT
Start: 2021-10-06

## 2021-10-06 RX ADMIN — SODIUM CHLORIDE 250 ML: 9 INJECTION, SOLUTION INTRAVENOUS at 10:54

## 2021-10-06 RX ADMIN — SODIUM CHLORIDE 780 MG: 900 INJECTION, SOLUTION INTRAVENOUS at 10:53

## 2021-10-06 NOTE — PROGRESS NOTES
Subjective   Holli Patel is a 56 y.o. female.  Referred by Dr. 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    3/1/2021-bilateral screening mammogram-microcalcifications seen in the posterior one third of the lateral aspect of the right breast.  Area of focal asymmetry seen in the middle one third of the upper inner quadrant of the left breast.    3/1/2021-DEXA scan-T score of -2.2 on the lumbar spine and T score of -2.3 in the left hip and T score of -1.9 in the right hip.  Findings consistent with osteopenia    3/23/2021-screening lung CT-there is a 10 x 11 mm solid nodule in the left upper lobe.  Enlarged AP window lymph node measuring 14 x 10 mm.  Suggestion of a possible 9 mm left hilar lymph node.  Heavy coronary artery calcification.    3/23/2021-diagnostic mammogram bilateral-cluster of microcalcifications in the middle third lateral aspect of the right breast.  Left breast demonstrates persistence of the area of focal asymmetry in the region.    Ultrasound-left breast ultrasound at 10 o'clock position, 6 cm from the nipple there is a 0.4 cm irregular hypoechoic lesion.  Stereotactic biopsy of the right breast calcifications recommended.  Ultrasound-guided biopsy of the left breast lesion recommended    4/7/2021-right breast stereotactic biopsy and left breast ultrasound-guided biopsy  Pathology  Right breast-invasive ductal carcinoma, grade 2, lymphovascular invasion present, ER +99% strong, WI +80% moderate, HER-2 negative, Ki-67 12%  Left breast upper inner quadrant 10 o'clock position biopsy, invasive ductal carcinoma, grade   2, ER +99% strong, WI +40% strong, HER-2 2+ on immunohistochemistry, nonamplified on FISH Ki-67 10%    4/14/2021-PET/CT-FDG avid aortopulmonary window lymph node measures 0.9 cm with an SUV of 7.5.  FDG avid left hilar lymph node  measures 1 cm with an SUV of 17.2.  Irregular soft tissue density in the right breast measuring 3.7 x 3.8 cm is favored to be secondary to the recent breast biopsy.  1.1 cm pulmonary nodule within the left upper lobe with SUV 9.7 .  Sub-6 mm pulmonary nodules are present in the right lower lobe.  No evidence of lymphadenopathy or metastatic disease in the abdomen.  Focal area of FDG uptake within the central canal posterior to T11-T12 displaced demonstrating an SUV of 5.5 thought to be reactive however MRI is recommended for further evaluation.    She was seen by Dr. Molina who initially planned for breast surgery however  due to the PET abnormalities she has been referred to Dr. Kerr who plans to do a wound on 4/30/2021.  Dr. Molina's and Dr. Kerr's notes reviewed.    Patient denies any family history of breast cancer.  Her mother had lymphoma.  Maternal grandmother had colon cancer.  Prior to that screening mammogram she did not have any palpable abnormalities of either breast.    She has been a heavy smoker for about 40 years and smoked 2 packs/day.  Denies any recent weight changes, new bone pains, cough, abdominal pain nausea vomiting constipation or diarrhea.  She does have anxiety and has been on several medications.  She has recently been started on Xanax to help with the mood and also with insomnia.    Patient had a video-assisted thoracoscopy and mediastinal lymphadenectomy on 4/30/2021.  L5-L6 lymph nodes were biopsied however the small pulmonary nodule in the left upper lobe was not difficult to find.  Pathology showed moderately differentiated adenocarcinoma which is CK7 and TTF-1 positive.  Consistent with lung primary.  Ki-67 85%.    5/14/2021-MRI of the brain-minimal chronic small vessel ischemic change in the white matter.  Otherwise negative MRI.    5/20/2021-bilateral axillary ultrasound-no evidence of axillary lymphadenopathy in either axilla.  Normal-appearing bilateral axillary lymph  nodes visualized.    Cycle 1 cisplatin and Alimta on 5/25/2021.    Cycle 2 cisplatin Alimta 6/15/2021    Cycle 3 cisplatin Alimta 7/7/2021    Completed radiation on 7/12/2021    Port was nonfunctional hence a port study was performed which showed that the port was backed up into the innominate vein and also there was a nonocclusive thrombus at the end of the catheter extending into the superior vena cava.  She was started on anticoagulation with Eliquis.  It was recommended for port revision of the intention is to keep the port.    PET/CT 8/5/2021-images independently reviewed and interpreted by me-decrease in size and FDG uptake of the left upper lobe pulmonary nodule as well as mediastinal and left hilar lymphadenopathy representing response to treatment.  Sub-6 mm pulmonary nodule in the left upper lobe new since 4/14/2021.  This could be related to radiation however follow-up CT in 3 months recommended.  Decrease in size of the irregular masslike tissue in the right breast which is postbiopsy hematoma.  This is not PET avid.  New segmental left eighth rib fractures healing.  A new band of sclerosis of the left seventh rib which favored to represent healing nondisplaced fracture.  Follow-up CT recommended.  Next focal uptake in the duodenum first and second portions.  Could be related to duodenitis.  Indeterminate lesion in the L1 vertebral body not well evaluated on PET/CT.    Interval history  Ms. Patel presents to the clinic today for follow-up.  She is accompanied by her .  She had an MRI of the lumbar spine on 10/1/2021.  Here to discuss the results of the same.  She is also scheduled for durvalumab.  She reports significant anxiety and claustrophobia with the MRI.  On the day of the MRI creatinine was noted to be elevated at 2.3 and repeat was elevated at 2.1.  Denies taking any Advil or other nephrotoxic medications.  No fevers chills or rashes.  No other complaints at this time  She was seen by  Dr. Molina on 10/4/2021 and the MRI of the spine was discussed with the patient.    The following portions of the patient's history were reviewed and updated as appropriate: allergies, current medications, past family history, past medical history, past social history, past surgical history and problem list.    Past Medical History:   Diagnosis Date   • Anxiety    • Dyspnea on exertion    • SHAYY (generalized anxiety disorder)     RELATED HIGH BLOOD PRESSURE   • GERD (gastroesophageal reflux disease)    • High blood pressure     ANXIETY RELATED   • Hyperlipidemia    • Hypothyroidism    • Insomnia    • Lung cancer (HCC)    • Lung nodule    • Malignant neoplasm of upper-outer quadrant of right breast in female, estrogen receptor positive (HCC) 4/13/2021   • Migraine    • PONV (postoperative nausea and vomiting)         Past Surgical History:   Procedure Laterality Date   • BREAST BIOPSY Bilateral 04/07/2021    Right Breast 10 o'clock & Left breast 10 o'clock 6 cm from nipple, [facility]   • CLOSED REDUCTION HIP DISLOCATION Left 4/30/2021    Procedure: BRONCHOSCOPY, LEFT VIDEO ASSITED THORACOSCOPY, ROBOTIC ASSSITED MEDIASTINAL LYMPHADENECTOMY WITH INTERCOSTAL NERVE BLOCKS;  Surgeon: Raphael Kerr III, MD;  Location: [facility];  Service: DaVinci;  Laterality: Left;   • COLONOSCOPY     • VENOUS ACCESS DEVICE (PORT) INSERTION Right 5/20/2021    Procedure: INSERTION VENOUS ACCESS DEVICE;  Surgeon: Raphael Kerr III, MD;  Location: [facility];  Service: Thoracic;  Laterality: Right;   • WRIST SURGERY Right         Family History   Problem Relation Age of Onset   • Cancer Father         LYMPHATIC   • Alcohol abuse Brother    • Colon cancer Maternal Grandmother    • Malig Hyperthermia Neg Hx         Social History     Socioeconomic History   • Marital status:      Spouse name: Not on file   • Number of children: Not on file   • Years of education: Not on file   • Highest education level: Not on file   Tobacco  Use   • Smoking status: Current Every Day Smoker     Packs/day: 1.00     Types: Electronic Cigarette, Cigarettes     Start date: 1981   • Smokeless tobacco: Never Used   • Tobacco comment: ABT 15 CIGARETTES DAILY   Vaping Use   • Vaping Use: Some days   • Substances: Nicotine, Flavoring   • Devices: Disposable   Substance and Sexual Activity   • Alcohol use: Never   • Drug use: Never   • Sexual activity: Yes     Partners: Male        OB History        2    Para   2    Term   2            AB        Living           SAB        TAB        Ectopic        Molar        Multiple        Live Births                Age of menarche-10  Age at menopause-42  Oral contraceptive pill use-15 years  No HRT use  She has 2 children  Age at first live childbirth-17    No Known Allergies     Review of Systems   Review of systems as mentioned in the HPI    Objective   not currently breastfeeding.       Physical Exam  Vitals reviewed.   HENT:      Head: Normocephalic.   Eyes:      Extraocular Movements: Extraocular movements intact.      Conjunctiva/sclera: Conjunctivae normal.      Pupils: Pupils are equal, round, and reactive to light.   Cardiovascular:      Rate and Rhythm: Normal rate and regular rhythm.      Pulses: Normal pulses.      Heart sounds: Normal heart sounds.   Pulmonary:      Effort: Pulmonary effort is normal.      Breath sounds: Normal breath sounds.   Abdominal:      General: Abdomen is flat. Bowel sounds are normal. There is no distension.      Palpations: There is no mass.   Musculoskeletal:         General: No swelling. Normal range of motion.      Cervical back: Normal range of motion.   Skin:     General: Skin is warm.   Neurological:      General: No focal deficit present.      Mental Status: She is alert and oriented to person, place, and time.   Psychiatric:         Mood and Affect: Mood normal.         Behavior: Behavior normal.     Breast Exam: Right breast with a large postbiopsy hematoma  in the upper outer quadrant measuring 4 cm.    No other palpable abnormalities.  Left breast with no palpable abnormalities.  No right or left axillary lymphadenopathy    I have reexamined the patient and the results are consistent with the previously documented exam. Salma Snell MD I have reexamined the patient and the results are consistent with the previously documented exam. Salma Snell MD     Lab on 10/06/2021   Component Date Value Ref Range Status   • WBC 10/06/2021 9.54  3.40 - 10.80 10*3/mm3 Final   • RBC 10/06/2021 3.83  3.77 - 5.28 10*6/mm3 Final   • Hemoglobin 10/06/2021 11.0* 12.0 - 15.9 g/dL Final   • Hematocrit 10/06/2021 35.8  34.0 - 46.6 % Final   • MCV 10/06/2021 93.5  79.0 - 97.0 fL Final   • MCH 10/06/2021 28.7  26.6 - 33.0 pg Final   • MCHC 10/06/2021 30.7* 31.5 - 35.7 g/dL Final   • RDW 10/06/2021 13.2  12.3 - 15.4 % Final   • RDW-SD 10/06/2021 45.0  37.0 - 54.0 fl Final   • MPV 10/06/2021 8.6  6.0 - 12.0 fL Final   • Platelets 10/06/2021 289  140 - 450 10*3/mm3 Final   • Neutrophil % 10/06/2021 72.4  42.7 - 76.0 % Final   • Lymphocyte % 10/06/2021 11.5* 19.6 - 45.3 % Final   • Monocyte % 10/06/2021 12.2* 5.0 - 12.0 % Final   • Eosinophil % 10/06/2021 2.5  0.3 - 6.2 % Final   • Basophil % 10/06/2021 0.6  0.0 - 1.5 % Final   • Immature Grans % 10/06/2021 0.8* 0.0 - 0.5 % Final   • Neutrophils, Absolute 10/06/2021 6.90  1.70 - 7.00 10*3/mm3 Final   • Lymphocytes, Absolute 10/06/2021 1.10  0.70 - 3.10 10*3/mm3 Final   • Monocytes, Absolute 10/06/2021 1.16* 0.10 - 0.90 10*3/mm3 Final   • Eosinophils, Absolute 10/06/2021 0.24  0.00 - 0.40 10*3/mm3 Final   • Basophils, Absolute 10/06/2021 0.06  0.00 - 0.20 10*3/mm3 Final   • Immature Grans, Absolute 10/06/2021 0.08* 0.00 - 0.05 10*3/mm3 Final   • nRBC 10/06/2021 0.0  0.0 - 0.2 /100 WBC Final   Hospital Outpatient Visit on 10/01/2021   Component Date Value Ref Range Status   • Creatinine 10/01/2021 2.30* 0.60 - 1.30 mg/dL Final    Serial  Number: [id number]  [id number]   • Creatinine 10/01/2021 2.10* 0.60 - 1.30 mg/dL Final    Serial Number: [id number]  [id number]   Infusion on 09/22/2021   Component Date Value Ref Range Status   • Glucose 09/22/2021 93  74 - 124 mg/dL Final   • BUN 09/22/2021 14  6 - 20 mg/dL Final   • Creatinine 09/22/2021 0.97  0.60 - 1.10 mg/dL Final   • Sodium 09/22/2021 139  134 - 145 mmol/L Final   • Potassium 09/22/2021 4.3  3.5 - 4.7 mmol/L Final   • Chloride 09/22/2021 102  98 - 107 mmol/L Final   • CO2 09/22/2021 25.2  22.0 - 29.0 mmol/L Final   • Calcium 09/22/2021 9.5  8.5 - 10.2 mg/dL Final   • Total Protein 09/22/2021 7.1  6.3 - 8.0 g/dL Final   • Albumin 09/22/2021 4.30  3.50 - 5.20 g/dL Final   • ALT (SGPT) 09/22/2021 13  0 - 33 U/L Final   • AST (SGOT) 09/22/2021 17  0 - 32 U/L Final   • Alkaline Phosphatase 09/22/2021 97  38 - 116 U/L Final   • Total Bilirubin 09/22/2021 <0.2* 0.2 - 1.2 mg/dL Final   • eGFR Non  Amer 09/22/2021 59* >60 mL/min/1.73 Final   • Globulin 09/22/2021 2.8  1.8 - 3.5 gm/dL Final   • A/G Ratio 09/22/2021 1.5  1.1 - 2.4 g/dL Final   • BUN/Creatinine Ratio 09/22/2021 14.4  7.3 - 30.0 Final   • Anion Gap 09/22/2021 11.8  5.0 - 15.0 mmol/L Final   • TSH 09/22/2021 1.250  0.270 - 4.200 uIU/mL Final   • Free T4 09/22/2021 1.10  0.93 - 1.70 ng/dL Final   • WBC 09/22/2021 8.76  3.40 - 10.80 10*3/mm3 Final   • RBC 09/22/2021 3.94  3.77 - 5.28 10*6/mm3 Final   • Hemoglobin 09/22/2021 11.4* 12.0 - 15.9 g/dL Final   • Hematocrit 09/22/2021 37.3  34.0 - 46.6 % Final   • MCV 09/22/2021 94.7  79.0 - 97.0 fL Final   • MCH 09/22/2021 28.9  26.6 - 33.0 pg Final   • MCHC 09/22/2021 30.6* 31.5 - 35.7 g/dL Final   • RDW 09/22/2021 14.6  12.3 - 15.4 % Final   • RDW-SD 09/22/2021 51.5  37.0 - 54.0 fl Final   • MPV 09/22/2021 8.9  6.0 - 12.0 fL Final   • Platelets 09/22/2021 266  140 - 450 10*3/mm3 Final   • Neutrophil % 09/22/2021 71.4  42.7 - 76.0 % Final   • Lymphocyte % 09/22/2021 15.4* 19.6 - 45.3 %  Final   • Monocyte % 09/22/2021 9.8  5.0 - 12.0 % Final   • Eosinophil % 09/22/2021 2.2  0.3 - 6.2 % Final   • Basophil % 09/22/2021 0.7  0.0 - 1.5 % Final   • Immature Grans % 09/22/2021 0.5  0.0 - 0.5 % Final   • Neutrophils, Absolute 09/22/2021 6.26  1.70 - 7.00 10*3/mm3 Final   • Lymphocytes, Absolute 09/22/2021 1.35  0.70 - 3.10 10*3/mm3 Final   • Monocytes, Absolute 09/22/2021 0.86  0.10 - 0.90 10*3/mm3 Final   • Eosinophils, Absolute 09/22/2021 0.19  0.00 - 0.40 10*3/mm3 Final   • Basophils, Absolute 09/22/2021 0.06  0.00 - 0.20 10*3/mm3 Final   • Immature Grans, Absolute 09/22/2021 0.04  0.00 - 0.05 10*3/mm3 Final   • nRBC 09/22/2021 0.0  0.0 - 0.2 /100 WBC Final   Hospital Outpatient Visit on 09/20/2021   Component Date Value Ref Range Status   • Creatinine 09/20/2021 0.90  0.60 - 1.30 mg/dL Final    Serial Number: [id number]  [id number]   Infusion on 09/08/2021   Component Date Value Ref Range Status   • Glucose 09/08/2021 85  74 - 124 mg/dL Final   • BUN 09/08/2021 14  6 - 20 mg/dL Final   • Creatinine 09/08/2021 1.21* 0.60 - 1.10 mg/dL Final   • Sodium 09/08/2021 140  134 - 145 mmol/L Final   • Potassium 09/08/2021 4.9* 3.5 - 4.7 mmol/L Final   • Chloride 09/08/2021 104  98 - 107 mmol/L Final   • CO2 09/08/2021 27.1  22.0 - 29.0 mmol/L Final   • Calcium 09/08/2021 9.3  8.5 - 10.2 mg/dL Final   • Total Protein 09/08/2021 6.6  6.3 - 8.0 g/dL Final   • Albumin 09/08/2021 4.20  3.50 - 5.20 g/dL Final   • ALT (SGPT) 09/08/2021 10  0 - 33 U/L Final   • AST (SGOT) 09/08/2021 16  0 - 32 U/L Final   • Alkaline Phosphatase 09/08/2021 92  38 - 116 U/L Final   • Total Bilirubin 09/08/2021 <0.2* 0.2 - 1.2 mg/dL Final   • eGFR Non  Amer 09/08/2021 46* >60 mL/min/1.73 Final   • Globulin 09/08/2021 2.4  1.8 - 3.5 gm/dL Final   • A/G Ratio 09/08/2021 1.8  1.1 - 2.4 g/dL Final   • BUN/Creatinine Ratio 09/08/2021 11.6  7.3 - 30.0 Final   • Anion Gap 09/08/2021 8.9  5.0 - 15.0 mmol/L Final   • WBC 09/08/2021 7.14   3.40 - 10.80 10*3/mm3 Final   • RBC 09/08/2021 3.90  3.77 - 5.28 10*6/mm3 Final   • Hemoglobin 09/08/2021 11.1* 12.0 - 15.9 g/dL Final   • Hematocrit 09/08/2021 36.2  34.0 - 46.6 % Final   • MCV 09/08/2021 92.8  79.0 - 97.0 fL Final   • MCH 09/08/2021 28.5  26.6 - 33.0 pg Final   • MCHC 09/08/2021 30.7* 31.5 - 35.7 g/dL Final   • RDW 09/08/2021 17.2* 12.3 - 15.4 % Final   • RDW-SD 09/08/2021 58.6* 37.0 - 54.0 fl Final   • MPV 09/08/2021 8.5  6.0 - 12.0 fL Final   • Platelets 09/08/2021 228  140 - 450 10*3/mm3 Final   • Neutrophil % 09/08/2021 67.5  42.7 - 76.0 % Final   • Lymphocyte % 09/08/2021 16.8* 19.6 - 45.3 % Final   • Monocyte % 09/08/2021 11.8  5.0 - 12.0 % Final   • Eosinophil % 09/08/2021 2.7  0.3 - 6.2 % Final   • Basophil % 09/08/2021 0.8  0.0 - 1.5 % Final   • Immature Grans % 09/08/2021 0.4  0.0 - 0.5 % Final   • Neutrophils, Absolute 09/08/2021 4.82  1.70 - 7.00 10*3/mm3 Final   • Lymphocytes, Absolute 09/08/2021 1.20  0.70 - 3.10 10*3/mm3 Final   • Monocytes, Absolute 09/08/2021 0.84  0.10 - 0.90 10*3/mm3 Final   • Eosinophils, Absolute 09/08/2021 0.19  0.00 - 0.40 10*3/mm3 Final   • Basophils, Absolute 09/08/2021 0.06  0.00 - 0.20 10*3/mm3 Final   • Immature Grans, Absolute 09/08/2021 0.03  0.00 - 0.05 10*3/mm3 Final   • nRBC 09/08/2021 0.0  0.0 - 0.2 /100 WBC Final        MRI Lumbar Spine Without Contrast    Result Date: 10/5/2021  1. In the last 4 months since prior lumbar spine MRI 05/27/2021, there has been interval enlargement of the rounded T1 hypointense T2 hyperintense lesion in the left posterior body of L1, on 05/27/2020 it measured 6 x 5 x 6 mm, and on the current exam it measures 14 x 14 x 12 mm, and its interval enlargement strongly suggests that it is a metastatic lesion. No additional osseous metastases are seen in the lumbar spine. 2. The remainder of the lumbar spine MRI is unchanged. There is mild lumbar lower lumbar spondylosis. At L4-L5, there is mild right and mild-to-moderate  left facet overgrowth and posterior central disc bulge or small disc protrusion that only minimally narrows the thecal sac and there is mild bilateral foraminal narrowing at L4-L5. At L5-S1, there is moderate bilateral facet overgrowth and a 4-5 mm degenerative anterolisthesis of L5 with respect to S1. There is some bilateral lateral recess narrowing could affect the traversing S1 nerve roots. There is loss of vertical height of the foramina and bulging disc material into the inferior aspect of the foramina, and synovial thickening off the facets extending into the posterior foramina, and there is mild-to-moderate bilateral foraminal narrowing at L5-S1.  This report was finalized on 10/5/2021 3:34 PM by Dr. Raphael Kingston M.D.       CBC 8/25/2021-WBC 7.76, hemoglobin 10.8, platelets 255    8/24/2021 bilateral diagnostic mammogram and bilateral ultrasound-images independently reviewed and interpreted by me  In the left breast there is a 1 cm focal asymmetry.  Previously measured 0.8 cm on 3/1/2021.  1.4 cm group of calcifications adjacent to the superior lateral margin of the biopsy clip which are new.    In the right breast there is a 3.5 cm postbiopsy hematoma.  This has decreased in size.  Loosely grouped residual calcifications measuring up to 2.5 cm.  These have decreased in number compared to 3/23/2021.  However the extent measures slightly larger could be due to superimposed hematoma.    Ultrasound  At 10 o'clock position of the left breast, 6 cm from the nipple there is an echogenic focus from the biopsy clip and the malignancy measures 0.4 x 0.3 cm.    10/6/2021-WBC 9.54, hemoglobin 11, platelets 289,000  CMP-BUN 14, creatinine 0.98, LFTs normal    10/1/2021-MRI of the lumbar spine 10/1/2021-images independently reviewed and interpreted by me.  There has been an interval enlargement in the rounded T1 hypointense, T2 hyperintense lesion in the left posterior body of L1.  Previously measured 6 x 5 x 6 mm and now  measures 14 x 14 x 12 mm.  This is highly concerning for osseous metastasis.  No other evidence of bony metastasis noted.  Other chronic changes.      Assessment/Plan      *Bilateral breast cancer  · Invasive ductal carcinoma in both right and left breast and lesions measure under 1 cm.  No palpable lymphadenopathy although unsure if this has been evaluated by an axillary ultrasound.  No abnormal axillary lymphadenopathy noted on PET.  · Most likely clinical T1b N0 M0, both cancers were noted to be grade 2, ER/TX positive and HER-2 negative and Ki-67 less than 15%  VATS on 4/30/2021  Biopsy confirms adenocarcinoma of pulmonary primary  Discussed with Dr. Molina about delaying breast surgery and she is in agreement  Darted on neoadjuvant anastrozole  · Patient experienced significant adverse effects with anastrozole  · Treatment changed to letrozole   · Axillary ultrasound 4/20/2021 -negative for any axillary lymphadenopathy  · Started letrozole in May 2021  · She continues on letrozole without any problems.  · 8/24/2021 bilateral diagnostic mammogram and ultrasound.  In the left breast the tumor now measures 1 cm on mammogram as opposed to 0.8 cm in March.  Right breast calcifications also noted to be in a large extent although the number seems to be decreased.  Hematoma has decreased in size.  · She was seen by Dr. Molina.  · MRI of the lumbar spine 10/1/2021 was noted by Dr. Molina and given the increase in size of the lesion in the L1, it was decided that we would have to wait on the biopsy of this lesion prior to proceeding with the definite breast surgery.  · I have discussed with Dr. Erasmo Dominguez with interventional radiology and he plans to perform a biopsy of the L1 lesion.  · If the L1 lesion is metastatic breast cancer, we could consider doing a bilateral lumpectomy and radiating the single metastatic site and continuing her on adjuvant endocrine therapy and consider adding Ibrance.  · However if this is  metastatic lung cancer then, breast surgery may not be too beneficial as her life expectancy would be limited by the lung cancer.  · I will discuss with Dr. Molina once the biopsy results are available.    *Adenocarcinoma of the left lung  · Most likely stage T1 N2 M0, stage III  · MRI of the thoracic spine ordered to further evaluate the abnormality seen on PET  · MRI of the brain negative   · Given that she is only 55 and fairly good performance status I discussed with her by with concurrent chemoradiation with cisplatin and Alimta every 3 weeks followed by durvalumab.  · Adverse effects including but not limited to myelosuppression, increased risk of infections, nausea, vomiting, renal dysfunction, ototoxicity, neurotoxicity have been discussed at length.  · MRI of the thoracic spine performed 5/24/2021 shows no evidence of metastatic disease in the thoracic spine however in L1 there is a 7 mm lesion which is indeterminate.  A lumbar spine MRI has been recommended.  · Discussed her case with Dr. Oates and he feels like MRI of the lumbar spine may also show that this lesion is indeterminate.  · Discussed the same with the patient and her .  · Even if this is metastatic disease this might be the only lesion of metastatic disease and then would consider this oligometastatic disease.  · Therefore plan to proceed with concurrent chemoradiation as scheduled.  · cisplatin and Alimta, C1D1 5/25/2021  · Day 1 of radiation 5/25/2021  · 5/27/2021-MRI of the lumbar spine-there is a 6 x 5 x 6 mm enhancing lesion in the left posterior body of L1 vertebra.  This is considered indeterminate.  Follow-up recommended.  Degenerative changes noted at L5-S1  · 7/7/2021, cycle 3 cisplatin Alimta  · 7/12/2021-radiation completed  · PET/CT 8/6/2021 with good response to chemoradiation.  · MRI of the lumbar spine 10/1/2021 with increase in size of the L1 lesion which now measures 14 mm as opposed to 6 mm in May 2021.  · A biopsy  of this lesion will be performed.    *Lumbar spine lesion-L1 lesion indeterminate.  MRI of the lumbar spine spine images independently reviewed and interpreted by me in detail summarized above.  Plan to repeat MRI in 4 months.  This will be due in September 2021  · MRI spine done on 2021 with increase in size of the L1 lesion  · Plan for biopsy of this lesion    *Bilateral breast cancer-no family history of breast cancer but given synchronous breast cancer genetic testing has been sent.  Genetic testing with a variant of unknown significance.    *Right hand numbness and difficulty with grasping objects  · Previously had history of carpal tunnel requiring repair.  · She is currently on letrozole which could cause worsening of the carpal tunnel  · Gabapentin dose will be increased to 600 mg 3 times daily  · She will also be referred to hand surgery for further evaluation.  · She now reports bilateral upper extremity numbness.  · Continue gabapentin  · Unchanged    *Anxiety-  · Currently on Xanax 1 mg nightly  · She required Valium for the MRI as she had claustrophobia and a panic attack  · Continue follow-up with supportive oncology  · Anxiety somewhat worse after learning about the MRI.    *Left-sided chest wall pain-secondary to VATS.  Most likely neuropathic.  Continue gabapentin and Norco as needed.  She ran out of gabapentin and experienced leg cramps.    Continue gabapentin      *Hypertension-continue lisinopril.  Blood pressure 114/79    *Smoking-she has not smoked in the past 2 days.  Trying to quit smoking.  Congratulated on her efforts.  Not addressed today    *CHASITY  · Creatinine elevated to 2.2 on 10/1/2021, unclear etiology  · CMP today shows a normal creatinine of 0.98    *GERD  · 7/7/2021, patient complains of reflux despite the use of Prilosec 20 mg twice daily.  We will send her a prescription for Carafate slurry 4 times daily.  · She has not quite started using the Carafate.  · Continue Prilosec and  recommend using Carafate  · PET/CT shows duodenitis      *Port not returning blood  · Patient reports the last treatment that difficulty getting blood return.  Today nursing was unable to get blood return and have placed Activase without success.  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  The end of the catheter has backed up into the innominate vein.  This was discussed with Dr. Snell and there is no need for additional imaging at this time.  WE WILL NOT USE THE PORT.   · She has been started on Eliquis.  Completed a week of 10 mg twice daily and now on 5 mg.  Continue the same  · We will obtain imaging to see if the disease is responded.  If patient wishes to get the port out then we will continue with anticoagulation for 3 months and remove the port.  However if she wishes to keep the port then a port revision will have to be performed.    · Continue Eliquis and remove port after 3 months  · Continue Eliquis at this time  · Port will have to be removed eventually.    PLAN:  1. Continue durvalumab today  2. Continue Eliquis 5 mg twice daily  3. Biopsy of the L1 lesion  4. Continue letrozole 2.5 mg daily.  Will address endocrine therapy based on the results of the lung lesion  5. Continue omeprazole 20mg twice daily.  6. Continue Carafate 4 times daily.   7. Follow-up in 1 to 2 weeks with the results of the L1 biopsy    Patient is on treatment requiring close monitoring for toxicities.  She also has progression of either lung or breast cancer and likely metastatic now with a new L1 lesion.  60 minutes have been spent on the encounter including face-to-face time, documentation on the same day, reviewing the medical records, discussing with Dr. Dominguez in interventional radiology, care coordination.    Salma Snell MD

## 2021-10-06 NOTE — H&P (VIEW-ONLY)
Subjective   Holli Patel is a 56 y.o. female.  Referred by Dr. 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    3/1/2021-bilateral screening mammogram-microcalcifications seen in the posterior one third of the lateral aspect of the right breast.  Area of focal asymmetry seen in the middle one third of the upper inner quadrant of the left breast.    3/1/2021-DEXA scan-T score of -2.2 on the lumbar spine and T score of -2.3 in the left hip and T score of -1.9 in the right hip.  Findings consistent with osteopenia    3/23/2021-screening lung CT-there is a 10 x 11 mm solid nodule in the left upper lobe.  Enlarged AP window lymph node measuring 14 x 10 mm.  Suggestion of a possible 9 mm left hilar lymph node.  Heavy coronary artery calcification.    3/23/2021-diagnostic mammogram bilateral-cluster of microcalcifications in the middle third lateral aspect of the right breast.  Left breast demonstrates persistence of the area of focal asymmetry in the region.    Ultrasound-left breast ultrasound at 10 o'clock position, 6 cm from the nipple there is a 0.4 cm irregular hypoechoic lesion.  Stereotactic biopsy of the right breast calcifications recommended.  Ultrasound-guided biopsy of the left breast lesion recommended    4/7/2021-right breast stereotactic biopsy and left breast ultrasound-guided biopsy  Pathology  Right breast-invasive ductal carcinoma, grade 2, lymphovascular invasion present, ER +99% strong, KY +80% moderate, HER-2 negative, Ki-67 12%  Left breast upper inner quadrant 10 o'clock position biopsy, invasive ductal carcinoma, grade   2, ER +99% strong, KY +40% strong, HER-2 2+ on immunohistochemistry, nonamplified on FISH Ki-67 10%    4/14/2021-PET/CT-FDG avid aortopulmonary window lymph node measures 0.9 cm with an SUV of 7.5.  FDG avid left hilar lymph node  measures 1 cm with an SUV of 17.2.  Irregular soft tissue density in the right breast measuring 3.7 x 3.8 cm is favored to be secondary to the recent breast biopsy.  1.1 cm pulmonary nodule within the left upper lobe with SUV 9.7 .  Sub-6 mm pulmonary nodules are present in the right lower lobe.  No evidence of lymphadenopathy or metastatic disease in the abdomen.  Focal area of FDG uptake within the central canal posterior to T11-T12 displaced demonstrating an SUV of 5.5 thought to be reactive however MRI is recommended for further evaluation.    She was seen by Dr. Molina who initially planned for breast surgery however  due to the PET abnormalities she has been referred to Dr. Kerr who plans to do a wound on 4/30/2021.  Dr. Molina's and Dr. Kerr's notes reviewed.    Patient denies any family history of breast cancer.  Her mother had lymphoma.  Maternal grandmother had colon cancer.  Prior to that screening mammogram she did not have any palpable abnormalities of either breast.    She has been a heavy smoker for about 40 years and smoked 2 packs/day.  Denies any recent weight changes, new bone pains, cough, abdominal pain nausea vomiting constipation or diarrhea.  She does have anxiety and has been on several medications.  She has recently been started on Xanax to help with the mood and also with insomnia.    Patient had a video-assisted thoracoscopy and mediastinal lymphadenectomy on 4/30/2021.  L5-L6 lymph nodes were biopsied however the small pulmonary nodule in the left upper lobe was not difficult to find.  Pathology showed moderately differentiated adenocarcinoma which is CK7 and TTF-1 positive.  Consistent with lung primary.  Ki-67 85%.    5/14/2021-MRI of the brain-minimal chronic small vessel ischemic change in the white matter.  Otherwise negative MRI.    5/20/2021-bilateral axillary ultrasound-no evidence of axillary lymphadenopathy in either axilla.  Normal-appearing bilateral axillary lymph  nodes visualized.    Cycle 1 cisplatin and Alimta on 5/25/2021.    Cycle 2 cisplatin Alimta 6/15/2021    Cycle 3 cisplatin Alimta 7/7/2021    Completed radiation on 7/12/2021    Port was nonfunctional hence a port study was performed which showed that the port was backed up into the innominate vein and also there was a nonocclusive thrombus at the end of the catheter extending into the superior vena cava.  She was started on anticoagulation with Eliquis.  It was recommended for port revision of the intention is to keep the port.    PET/CT 8/5/2021-images independently reviewed and interpreted by me-decrease in size and FDG uptake of the left upper lobe pulmonary nodule as well as mediastinal and left hilar lymphadenopathy representing response to treatment.  Sub-6 mm pulmonary nodule in the left upper lobe new since 4/14/2021.  This could be related to radiation however follow-up CT in 3 months recommended.  Decrease in size of the irregular masslike tissue in the right breast which is postbiopsy hematoma.  This is not PET avid.  New segmental left eighth rib fractures healing.  A new band of sclerosis of the left seventh rib which favored to represent healing nondisplaced fracture.  Follow-up CT recommended.  Next focal uptake in the duodenum first and second portions.  Could be related to duodenitis.  Indeterminate lesion in the L1 vertebral body not well evaluated on PET/CT.    Interval history  Ms. Patel presents to the clinic today for follow-up.  She is accompanied by her .  She had an MRI of the lumbar spine on 10/1/2021.  Here to discuss the results of the same.  She is also scheduled for durvalumab.  She reports significant anxiety and claustrophobia with the MRI.  On the day of the MRI creatinine was noted to be elevated at 2.3 and repeat was elevated at 2.1.  Denies taking any Advil or other nephrotoxic medications.  No fevers chills or rashes.  No other complaints at this time  She was seen by  Dr. Molina on 10/4/2021 and the MRI of the spine was discussed with the patient.    The following portions of the patient's history were reviewed and updated as appropriate: allergies, current medications, past family history, past medical history, past social history, past surgical history and problem list.    Past Medical History:   Diagnosis Date   • Anxiety    • Dyspnea on exertion    • SHAYY (generalized anxiety disorder)     RELATED HIGH BLOOD PRESSURE   • GERD (gastroesophageal reflux disease)    • High blood pressure     ANXIETY RELATED   • Hyperlipidemia    • Hypothyroidism    • Insomnia    • Lung cancer (HCC)    • Lung nodule    • Malignant neoplasm of upper-outer quadrant of right breast in female, estrogen receptor positive (HCC) 4/13/2021   • Migraine    • PONV (postoperative nausea and vomiting)         Past Surgical History:   Procedure Laterality Date   • BREAST BIOPSY Bilateral 04/07/2021    Right Breast 10 o'clock & Left breast 10 o'clock 6 cm from nipple, [facility]   • CLOSED REDUCTION HIP DISLOCATION Left 4/30/2021    Procedure: BRONCHOSCOPY, LEFT VIDEO ASSITED THORACOSCOPY, ROBOTIC ASSSITED MEDIASTINAL LYMPHADENECTOMY WITH INTERCOSTAL NERVE BLOCKS;  Surgeon: Raphael Kerr III, MD;  Location: [facility];  Service: DaVinci;  Laterality: Left;   • COLONOSCOPY     • VENOUS ACCESS DEVICE (PORT) INSERTION Right 5/20/2021    Procedure: INSERTION VENOUS ACCESS DEVICE;  Surgeon: Raphael Kerr III, MD;  Location: [facility];  Service: Thoracic;  Laterality: Right;   • WRIST SURGERY Right         Family History   Problem Relation Age of Onset   • Cancer Father         LYMPHATIC   • Alcohol abuse Brother    • Colon cancer Maternal Grandmother    • Malig Hyperthermia Neg Hx         Social History     Socioeconomic History   • Marital status:      Spouse name: Not on file   • Number of children: Not on file   • Years of education: Not on file   • Highest education level: Not on file   Tobacco  Use   • Smoking status: Current Every Day Smoker     Packs/day: 1.00     Types: Electronic Cigarette, Cigarettes     Start date: 1981   • Smokeless tobacco: Never Used   • Tobacco comment: ABT 15 CIGARETTES DAILY   Vaping Use   • Vaping Use: Some days   • Substances: Nicotine, Flavoring   • Devices: Disposable   Substance and Sexual Activity   • Alcohol use: Never   • Drug use: Never   • Sexual activity: Yes     Partners: Male        OB History        2    Para   2    Term   2            AB        Living           SAB        TAB        Ectopic        Molar        Multiple        Live Births                Age of menarche-10  Age at menopause-42  Oral contraceptive pill use-15 years  No HRT use  She has 2 children  Age at first live childbirth-17    No Known Allergies     Review of Systems   Review of systems as mentioned in the HPI    Objective   not currently breastfeeding.       Physical Exam  Vitals reviewed.   HENT:      Head: Normocephalic.   Eyes:      Extraocular Movements: Extraocular movements intact.      Conjunctiva/sclera: Conjunctivae normal.      Pupils: Pupils are equal, round, and reactive to light.   Cardiovascular:      Rate and Rhythm: Normal rate and regular rhythm.      Pulses: Normal pulses.      Heart sounds: Normal heart sounds.   Pulmonary:      Effort: Pulmonary effort is normal.      Breath sounds: Normal breath sounds.   Abdominal:      General: Abdomen is flat. Bowel sounds are normal. There is no distension.      Palpations: There is no mass.   Musculoskeletal:         General: No swelling. Normal range of motion.      Cervical back: Normal range of motion.   Skin:     General: Skin is warm.   Neurological:      General: No focal deficit present.      Mental Status: She is alert and oriented to person, place, and time.   Psychiatric:         Mood and Affect: Mood normal.         Behavior: Behavior normal.     Breast Exam: Right breast with a large postbiopsy hematoma  in the upper outer quadrant measuring 4 cm.    No other palpable abnormalities.  Left breast with no palpable abnormalities.  No right or left axillary lymphadenopathy    I have reexamined the patient and the results are consistent with the previously documented exam. Salma Snell MD I have reexamined the patient and the results are consistent with the previously documented exam. Salma nSell MD     Lab on 10/06/2021   Component Date Value Ref Range Status   • WBC 10/06/2021 9.54  3.40 - 10.80 10*3/mm3 Final   • RBC 10/06/2021 3.83  3.77 - 5.28 10*6/mm3 Final   • Hemoglobin 10/06/2021 11.0* 12.0 - 15.9 g/dL Final   • Hematocrit 10/06/2021 35.8  34.0 - 46.6 % Final   • MCV 10/06/2021 93.5  79.0 - 97.0 fL Final   • MCH 10/06/2021 28.7  26.6 - 33.0 pg Final   • MCHC 10/06/2021 30.7* 31.5 - 35.7 g/dL Final   • RDW 10/06/2021 13.2  12.3 - 15.4 % Final   • RDW-SD 10/06/2021 45.0  37.0 - 54.0 fl Final   • MPV 10/06/2021 8.6  6.0 - 12.0 fL Final   • Platelets 10/06/2021 289  140 - 450 10*3/mm3 Final   • Neutrophil % 10/06/2021 72.4  42.7 - 76.0 % Final   • Lymphocyte % 10/06/2021 11.5* 19.6 - 45.3 % Final   • Monocyte % 10/06/2021 12.2* 5.0 - 12.0 % Final   • Eosinophil % 10/06/2021 2.5  0.3 - 6.2 % Final   • Basophil % 10/06/2021 0.6  0.0 - 1.5 % Final   • Immature Grans % 10/06/2021 0.8* 0.0 - 0.5 % Final   • Neutrophils, Absolute 10/06/2021 6.90  1.70 - 7.00 10*3/mm3 Final   • Lymphocytes, Absolute 10/06/2021 1.10  0.70 - 3.10 10*3/mm3 Final   • Monocytes, Absolute 10/06/2021 1.16* 0.10 - 0.90 10*3/mm3 Final   • Eosinophils, Absolute 10/06/2021 0.24  0.00 - 0.40 10*3/mm3 Final   • Basophils, Absolute 10/06/2021 0.06  0.00 - 0.20 10*3/mm3 Final   • Immature Grans, Absolute 10/06/2021 0.08* 0.00 - 0.05 10*3/mm3 Final   • nRBC 10/06/2021 0.0  0.0 - 0.2 /100 WBC Final   Hospital Outpatient Visit on 10/01/2021   Component Date Value Ref Range Status   • Creatinine 10/01/2021 2.30* 0.60 - 1.30 mg/dL Final    Serial  Number: [id number]  [id number]   • Creatinine 10/01/2021 2.10* 0.60 - 1.30 mg/dL Final    Serial Number: [id number]  [id number]   Infusion on 09/22/2021   Component Date Value Ref Range Status   • Glucose 09/22/2021 93  74 - 124 mg/dL Final   • BUN 09/22/2021 14  6 - 20 mg/dL Final   • Creatinine 09/22/2021 0.97  0.60 - 1.10 mg/dL Final   • Sodium 09/22/2021 139  134 - 145 mmol/L Final   • Potassium 09/22/2021 4.3  3.5 - 4.7 mmol/L Final   • Chloride 09/22/2021 102  98 - 107 mmol/L Final   • CO2 09/22/2021 25.2  22.0 - 29.0 mmol/L Final   • Calcium 09/22/2021 9.5  8.5 - 10.2 mg/dL Final   • Total Protein 09/22/2021 7.1  6.3 - 8.0 g/dL Final   • Albumin 09/22/2021 4.30  3.50 - 5.20 g/dL Final   • ALT (SGPT) 09/22/2021 13  0 - 33 U/L Final   • AST (SGOT) 09/22/2021 17  0 - 32 U/L Final   • Alkaline Phosphatase 09/22/2021 97  38 - 116 U/L Final   • Total Bilirubin 09/22/2021 <0.2* 0.2 - 1.2 mg/dL Final   • eGFR Non  Amer 09/22/2021 59* >60 mL/min/1.73 Final   • Globulin 09/22/2021 2.8  1.8 - 3.5 gm/dL Final   • A/G Ratio 09/22/2021 1.5  1.1 - 2.4 g/dL Final   • BUN/Creatinine Ratio 09/22/2021 14.4  7.3 - 30.0 Final   • Anion Gap 09/22/2021 11.8  5.0 - 15.0 mmol/L Final   • TSH 09/22/2021 1.250  0.270 - 4.200 uIU/mL Final   • Free T4 09/22/2021 1.10  0.93 - 1.70 ng/dL Final   • WBC 09/22/2021 8.76  3.40 - 10.80 10*3/mm3 Final   • RBC 09/22/2021 3.94  3.77 - 5.28 10*6/mm3 Final   • Hemoglobin 09/22/2021 11.4* 12.0 - 15.9 g/dL Final   • Hematocrit 09/22/2021 37.3  34.0 - 46.6 % Final   • MCV 09/22/2021 94.7  79.0 - 97.0 fL Final   • MCH 09/22/2021 28.9  26.6 - 33.0 pg Final   • MCHC 09/22/2021 30.6* 31.5 - 35.7 g/dL Final   • RDW 09/22/2021 14.6  12.3 - 15.4 % Final   • RDW-SD 09/22/2021 51.5  37.0 - 54.0 fl Final   • MPV 09/22/2021 8.9  6.0 - 12.0 fL Final   • Platelets 09/22/2021 266  140 - 450 10*3/mm3 Final   • Neutrophil % 09/22/2021 71.4  42.7 - 76.0 % Final   • Lymphocyte % 09/22/2021 15.4* 19.6 - 45.3 %  Final   • Monocyte % 09/22/2021 9.8  5.0 - 12.0 % Final   • Eosinophil % 09/22/2021 2.2  0.3 - 6.2 % Final   • Basophil % 09/22/2021 0.7  0.0 - 1.5 % Final   • Immature Grans % 09/22/2021 0.5  0.0 - 0.5 % Final   • Neutrophils, Absolute 09/22/2021 6.26  1.70 - 7.00 10*3/mm3 Final   • Lymphocytes, Absolute 09/22/2021 1.35  0.70 - 3.10 10*3/mm3 Final   • Monocytes, Absolute 09/22/2021 0.86  0.10 - 0.90 10*3/mm3 Final   • Eosinophils, Absolute 09/22/2021 0.19  0.00 - 0.40 10*3/mm3 Final   • Basophils, Absolute 09/22/2021 0.06  0.00 - 0.20 10*3/mm3 Final   • Immature Grans, Absolute 09/22/2021 0.04  0.00 - 0.05 10*3/mm3 Final   • nRBC 09/22/2021 0.0  0.0 - 0.2 /100 WBC Final   Hospital Outpatient Visit on 09/20/2021   Component Date Value Ref Range Status   • Creatinine 09/20/2021 0.90  0.60 - 1.30 mg/dL Final    Serial Number: [id number]  [id number]   Infusion on 09/08/2021   Component Date Value Ref Range Status   • Glucose 09/08/2021 85  74 - 124 mg/dL Final   • BUN 09/08/2021 14  6 - 20 mg/dL Final   • Creatinine 09/08/2021 1.21* 0.60 - 1.10 mg/dL Final   • Sodium 09/08/2021 140  134 - 145 mmol/L Final   • Potassium 09/08/2021 4.9* 3.5 - 4.7 mmol/L Final   • Chloride 09/08/2021 104  98 - 107 mmol/L Final   • CO2 09/08/2021 27.1  22.0 - 29.0 mmol/L Final   • Calcium 09/08/2021 9.3  8.5 - 10.2 mg/dL Final   • Total Protein 09/08/2021 6.6  6.3 - 8.0 g/dL Final   • Albumin 09/08/2021 4.20  3.50 - 5.20 g/dL Final   • ALT (SGPT) 09/08/2021 10  0 - 33 U/L Final   • AST (SGOT) 09/08/2021 16  0 - 32 U/L Final   • Alkaline Phosphatase 09/08/2021 92  38 - 116 U/L Final   • Total Bilirubin 09/08/2021 <0.2* 0.2 - 1.2 mg/dL Final   • eGFR Non  Amer 09/08/2021 46* >60 mL/min/1.73 Final   • Globulin 09/08/2021 2.4  1.8 - 3.5 gm/dL Final   • A/G Ratio 09/08/2021 1.8  1.1 - 2.4 g/dL Final   • BUN/Creatinine Ratio 09/08/2021 11.6  7.3 - 30.0 Final   • Anion Gap 09/08/2021 8.9  5.0 - 15.0 mmol/L Final   • WBC 09/08/2021 7.14   3.40 - 10.80 10*3/mm3 Final   • RBC 09/08/2021 3.90  3.77 - 5.28 10*6/mm3 Final   • Hemoglobin 09/08/2021 11.1* 12.0 - 15.9 g/dL Final   • Hematocrit 09/08/2021 36.2  34.0 - 46.6 % Final   • MCV 09/08/2021 92.8  79.0 - 97.0 fL Final   • MCH 09/08/2021 28.5  26.6 - 33.0 pg Final   • MCHC 09/08/2021 30.7* 31.5 - 35.7 g/dL Final   • RDW 09/08/2021 17.2* 12.3 - 15.4 % Final   • RDW-SD 09/08/2021 58.6* 37.0 - 54.0 fl Final   • MPV 09/08/2021 8.5  6.0 - 12.0 fL Final   • Platelets 09/08/2021 228  140 - 450 10*3/mm3 Final   • Neutrophil % 09/08/2021 67.5  42.7 - 76.0 % Final   • Lymphocyte % 09/08/2021 16.8* 19.6 - 45.3 % Final   • Monocyte % 09/08/2021 11.8  5.0 - 12.0 % Final   • Eosinophil % 09/08/2021 2.7  0.3 - 6.2 % Final   • Basophil % 09/08/2021 0.8  0.0 - 1.5 % Final   • Immature Grans % 09/08/2021 0.4  0.0 - 0.5 % Final   • Neutrophils, Absolute 09/08/2021 4.82  1.70 - 7.00 10*3/mm3 Final   • Lymphocytes, Absolute 09/08/2021 1.20  0.70 - 3.10 10*3/mm3 Final   • Monocytes, Absolute 09/08/2021 0.84  0.10 - 0.90 10*3/mm3 Final   • Eosinophils, Absolute 09/08/2021 0.19  0.00 - 0.40 10*3/mm3 Final   • Basophils, Absolute 09/08/2021 0.06  0.00 - 0.20 10*3/mm3 Final   • Immature Grans, Absolute 09/08/2021 0.03  0.00 - 0.05 10*3/mm3 Final   • nRBC 09/08/2021 0.0  0.0 - 0.2 /100 WBC Final        MRI Lumbar Spine Without Contrast    Result Date: 10/5/2021  1. In the last 4 months since prior lumbar spine MRI 05/27/2021, there has been interval enlargement of the rounded T1 hypointense T2 hyperintense lesion in the left posterior body of L1, on 05/27/2020 it measured 6 x 5 x 6 mm, and on the current exam it measures 14 x 14 x 12 mm, and its interval enlargement strongly suggests that it is a metastatic lesion. No additional osseous metastases are seen in the lumbar spine. 2. The remainder of the lumbar spine MRI is unchanged. There is mild lumbar lower lumbar spondylosis. At L4-L5, there is mild right and mild-to-moderate  left facet overgrowth and posterior central disc bulge or small disc protrusion that only minimally narrows the thecal sac and there is mild bilateral foraminal narrowing at L4-L5. At L5-S1, there is moderate bilateral facet overgrowth and a 4-5 mm degenerative anterolisthesis of L5 with respect to S1. There is some bilateral lateral recess narrowing could affect the traversing S1 nerve roots. There is loss of vertical height of the foramina and bulging disc material into the inferior aspect of the foramina, and synovial thickening off the facets extending into the posterior foramina, and there is mild-to-moderate bilateral foraminal narrowing at L5-S1.  This report was finalized on 10/5/2021 3:34 PM by Dr. Raphael Kingston M.D.       CBC 8/25/2021-WBC 7.76, hemoglobin 10.8, platelets 255    8/24/2021 bilateral diagnostic mammogram and bilateral ultrasound-images independently reviewed and interpreted by me  In the left breast there is a 1 cm focal asymmetry.  Previously measured 0.8 cm on 3/1/2021.  1.4 cm group of calcifications adjacent to the superior lateral margin of the biopsy clip which are new.    In the right breast there is a 3.5 cm postbiopsy hematoma.  This has decreased in size.  Loosely grouped residual calcifications measuring up to 2.5 cm.  These have decreased in number compared to 3/23/2021.  However the extent measures slightly larger could be due to superimposed hematoma.    Ultrasound  At 10 o'clock position of the left breast, 6 cm from the nipple there is an echogenic focus from the biopsy clip and the malignancy measures 0.4 x 0.3 cm.    10/6/2021-WBC 9.54, hemoglobin 11, platelets 289,000  CMP-BUN 14, creatinine 0.98, LFTs normal    10/1/2021-MRI of the lumbar spine 10/1/2021-images independently reviewed and interpreted by me.  There has been an interval enlargement in the rounded T1 hypointense, T2 hyperintense lesion in the left posterior body of L1.  Previously measured 6 x 5 x 6 mm and now  measures 14 x 14 x 12 mm.  This is highly concerning for osseous metastasis.  No other evidence of bony metastasis noted.  Other chronic changes.      Assessment/Plan      *Bilateral breast cancer  · Invasive ductal carcinoma in both right and left breast and lesions measure under 1 cm.  No palpable lymphadenopathy although unsure if this has been evaluated by an axillary ultrasound.  No abnormal axillary lymphadenopathy noted on PET.  · Most likely clinical T1b N0 M0, both cancers were noted to be grade 2, ER/AL positive and HER-2 negative and Ki-67 less than 15%  VATS on 4/30/2021  Biopsy confirms adenocarcinoma of pulmonary primary  Discussed with Dr. Molina about delaying breast surgery and she is in agreement  Darted on neoadjuvant anastrozole  · Patient experienced significant adverse effects with anastrozole  · Treatment changed to letrozole   · Axillary ultrasound 4/20/2021 -negative for any axillary lymphadenopathy  · Started letrozole in May 2021  · She continues on letrozole without any problems.  · 8/24/2021 bilateral diagnostic mammogram and ultrasound.  In the left breast the tumor now measures 1 cm on mammogram as opposed to 0.8 cm in March.  Right breast calcifications also noted to be in a large extent although the number seems to be decreased.  Hematoma has decreased in size.  · She was seen by Dr. Molina.  · MRI of the lumbar spine 10/1/2021 was noted by Dr. Molina and given the increase in size of the lesion in the L1, it was decided that we would have to wait on the biopsy of this lesion prior to proceeding with the definite breast surgery.  · I have discussed with Dr. Erasmo Dominguez with interventional radiology and he plans to perform a biopsy of the L1 lesion.  · If the L1 lesion is metastatic breast cancer, we could consider doing a bilateral lumpectomy and radiating the single metastatic site and continuing her on adjuvant endocrine therapy and consider adding Ibrance.  · However if this is  metastatic lung cancer then, breast surgery may not be too beneficial as her life expectancy would be limited by the lung cancer.  · I will discuss with Dr. Molina once the biopsy results are available.    *Adenocarcinoma of the left lung  · Most likely stage T1 N2 M0, stage III  · MRI of the thoracic spine ordered to further evaluate the abnormality seen on PET  · MRI of the brain negative   · Given that she is only 55 and fairly good performance status I discussed with her by with concurrent chemoradiation with cisplatin and Alimta every 3 weeks followed by durvalumab.  · Adverse effects including but not limited to myelosuppression, increased risk of infections, nausea, vomiting, renal dysfunction, ototoxicity, neurotoxicity have been discussed at length.  · MRI of the thoracic spine performed 5/24/2021 shows no evidence of metastatic disease in the thoracic spine however in L1 there is a 7 mm lesion which is indeterminate.  A lumbar spine MRI has been recommended.  · Discussed her case with Dr. Oates and he feels like MRI of the lumbar spine may also show that this lesion is indeterminate.  · Discussed the same with the patient and her .  · Even if this is metastatic disease this might be the only lesion of metastatic disease and then would consider this oligometastatic disease.  · Therefore plan to proceed with concurrent chemoradiation as scheduled.  · cisplatin and Alimta, C1D1 5/25/2021  · Day 1 of radiation 5/25/2021  · 5/27/2021-MRI of the lumbar spine-there is a 6 x 5 x 6 mm enhancing lesion in the left posterior body of L1 vertebra.  This is considered indeterminate.  Follow-up recommended.  Degenerative changes noted at L5-S1  · 7/7/2021, cycle 3 cisplatin Alimta  · 7/12/2021-radiation completed  · PET/CT 8/6/2021 with good response to chemoradiation.  · MRI of the lumbar spine 10/1/2021 with increase in size of the L1 lesion which now measures 14 mm as opposed to 6 mm in May 2021.  · A biopsy  of this lesion will be performed.    *Lumbar spine lesion-L1 lesion indeterminate.  MRI of the lumbar spine spine images independently reviewed and interpreted by me in detail summarized above.  Plan to repeat MRI in 4 months.  This will be due in September 2021  · MRI spine done on 2021 with increase in size of the L1 lesion  · Plan for biopsy of this lesion    *Bilateral breast cancer-no family history of breast cancer but given synchronous breast cancer genetic testing has been sent.  Genetic testing with a variant of unknown significance.    *Right hand numbness and difficulty with grasping objects  · Previously had history of carpal tunnel requiring repair.  · She is currently on letrozole which could cause worsening of the carpal tunnel  · Gabapentin dose will be increased to 600 mg 3 times daily  · She will also be referred to hand surgery for further evaluation.  · She now reports bilateral upper extremity numbness.  · Continue gabapentin  · Unchanged    *Anxiety-  · Currently on Xanax 1 mg nightly  · She required Valium for the MRI as she had claustrophobia and a panic attack  · Continue follow-up with supportive oncology  · Anxiety somewhat worse after learning about the MRI.    *Left-sided chest wall pain-secondary to VATS.  Most likely neuropathic.  Continue gabapentin and Norco as needed.  She ran out of gabapentin and experienced leg cramps.    Continue gabapentin      *Hypertension-continue lisinopril.  Blood pressure 114/79    *Smoking-she has not smoked in the past 2 days.  Trying to quit smoking.  Congratulated on her efforts.  Not addressed today    *CHASITY  · Creatinine elevated to 2.2 on 10/1/2021, unclear etiology  · CMP today shows a normal creatinine of 0.98    *GERD  · 7/7/2021, patient complains of reflux despite the use of Prilosec 20 mg twice daily.  We will send her a prescription for Carafate slurry 4 times daily.  · She has not quite started using the Carafate.  · Continue Prilosec and  recommend using Carafate  · PET/CT shows duodenitis      *Port not returning blood  · Patient reports the last treatment that difficulty getting blood return.  Today nursing was unable to get blood return and have placed Activase without success.  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  The end of the catheter has backed up into the innominate vein.  This was discussed with Dr. Snell and there is no need for additional imaging at this time.  WE WILL NOT USE THE PORT.   · She has been started on Eliquis.  Completed a week of 10 mg twice daily and now on 5 mg.  Continue the same  · We will obtain imaging to see if the disease is responded.  If patient wishes to get the port out then we will continue with anticoagulation for 3 months and remove the port.  However if she wishes to keep the port then a port revision will have to be performed.    · Continue Eliquis and remove port after 3 months  · Continue Eliquis at this time  · Port will have to be removed eventually.    PLAN:  1. Continue durvalumab today  2. Continue Eliquis 5 mg twice daily  3. Biopsy of the L1 lesion  4. Continue letrozole 2.5 mg daily.  Will address endocrine therapy based on the results of the lung lesion  5. Continue omeprazole 20mg twice daily.  6. Continue Carafate 4 times daily.   7. Follow-up in 1 to 2 weeks with the results of the L1 biopsy    Patient is on treatment requiring close monitoring for toxicities.  She also has progression of either lung or breast cancer and likely metastatic now with a new L1 lesion.  60 minutes have been spent on the encounter including face-to-face time, documentation on the same day, reviewing the medical records, discussing with Dr. Dominguez in interventional radiology, care coordination.    Salma Snell MD

## 2021-10-08 RX ORDER — LETROZOLE 2.5 MG/1
TABLET, FILM COATED ORAL
Qty: 30 TABLET | Refills: 3 | Status: SHIPPED | OUTPATIENT
Start: 2021-10-08 | End: 2021-10-22

## 2021-10-08 NOTE — TELEPHONE ENCOUNTER
Letrozole refill request rec electronically from [facility]. Per last office note from Dr Snell-pt will continue.    Continue letrozole 2.5 mg daily.  Will address endocrine therapy based on the results of the lung lesion    I have routed the rx to Dr Snell for signature. Once signed it will be escribed to [facility].

## 2021-10-08 NOTE — PROGRESS NOTES
10/14/21 0000   Pre-Procedure Phone Call   Procedure Time Verified Yes   Arrival Time 0830   Procedure Location Verified Yes   Medical History Reviewed No   NPO Status Reinforced Yes   Ride and Caregiver Arranged Yes   Phone Number for Ride/Caregiver informed pt to hold eliquis x 2 days prior to procedure. Last dose to be 10/11/21. Pt verbalizes understanding and stated that her MD told her to hold x 3 days.  So last dose would be 10/10/21.   Patient Knows to Bring Current Medications No   Bring Outside Films Requested No

## 2021-10-11 DIAGNOSIS — R59.0 MEDIASTINAL ADENOPATHY: ICD-10-CM

## 2021-10-11 RX ORDER — GABAPENTIN 300 MG/1
600 CAPSULE ORAL 3 TIMES DAILY
Qty: 180 CAPSULE | Refills: 0 | Status: SHIPPED | OUTPATIENT
Start: 2021-10-11 | End: 2021-11-01

## 2021-10-14 ENCOUNTER — HOSPITAL ENCOUNTER (OUTPATIENT)
Dept: CT IMAGING | Facility: HOSPITAL | Age: 56
Discharge: HOME | End: 2021-10-14
Admitting: INTERNAL MEDICINE

## 2021-10-14 VITALS
BODY MASS INDEX: 29.23 KG/M2 | TEMPERATURE: 98.4 F | SYSTOLIC BLOOD PRESSURE: 117 MMHG | HEIGHT: 63 IN | OXYGEN SATURATION: 95 % | DIASTOLIC BLOOD PRESSURE: 85 MMHG | WEIGHT: 165 LBS | HEART RATE: 109 BPM | RESPIRATION RATE: 16 BRPM

## 2021-10-14 DIAGNOSIS — S32.009A: ICD-10-CM

## 2021-10-14 DIAGNOSIS — C34.12 PRIMARY ADENOCARCINOMA OF UPPER LOBE OF LEFT LUNG (HCC): ICD-10-CM

## 2021-10-14 DIAGNOSIS — S34.109A: ICD-10-CM

## 2021-10-14 LAB
BASOPHILS # BLD AUTO: 0.07 10*3/MM3 (ref 0–0.2)
BASOPHILS NFR BLD AUTO: 0.5 % (ref 0–1.5)
DEPRECATED RDW RBC AUTO: 41.1 FL (ref 37–54)
EOSINOPHIL # BLD AUTO: 0.23 10*3/MM3 (ref 0–0.4)
EOSINOPHIL NFR BLD AUTO: 1.8 % (ref 0.3–6.2)
ERYTHROCYTE [DISTWIDTH] IN BLOOD BY AUTOMATED COUNT: 12.6 % (ref 12.3–15.4)
HCT VFR BLD AUTO: 32.6 % (ref 34–46.6)
HGB BLD-MCNC: 10.4 G/DL (ref 12–15.9)
IMM GRANULOCYTES # BLD AUTO: 0.06 10*3/MM3 (ref 0–0.05)
IMM GRANULOCYTES NFR BLD AUTO: 0.5 % (ref 0–0.5)
INR PPP: 1.1 (ref 0.8–1.2)
LYMPHOCYTES # BLD AUTO: 0.97 10*3/MM3 (ref 0.7–3.1)
LYMPHOCYTES NFR BLD AUTO: 7.4 % (ref 19.6–45.3)
MCH RBC QN AUTO: 28.4 PG (ref 26.6–33)
MCHC RBC AUTO-ENTMCNC: 31.9 G/DL (ref 31.5–35.7)
MCV RBC AUTO: 89.1 FL (ref 79–97)
MONOCYTES # BLD AUTO: 1.3 10*3/MM3 (ref 0.1–0.9)
MONOCYTES NFR BLD AUTO: 9.9 % (ref 5–12)
NEUTROPHILS NFR BLD AUTO: 10.47 10*3/MM3 (ref 1.7–7)
NEUTROPHILS NFR BLD AUTO: 79.9 % (ref 42.7–76)
NRBC BLD AUTO-RTO: 0 /100 WBC (ref 0–0.2)
PLATELET # BLD AUTO: 371 10*3/MM3 (ref 140–450)
PLATELET # BLD AUTO: 391 10*3/MM3 (ref 140–450)
PMV BLD AUTO: 9.5 FL (ref 6–12)
PROTHROMBIN TIME: 13.1 SECONDS (ref 12.8–15.2)
RBC # BLD AUTO: 3.66 10*6/MM3 (ref 3.77–5.28)
WBC # BLD AUTO: 13.1 10*3/MM3 (ref 3.4–10.8)

## 2021-10-14 PROCEDURE — 85049 AUTOMATED PLATELET COUNT: CPT | Performed by: RADIOLOGY

## 2021-10-14 PROCEDURE — 85025 COMPLETE CBC W/AUTO DIFF WBC: CPT | Performed by: RADIOLOGY

## 2021-10-14 PROCEDURE — 77012 CT SCAN FOR NEEDLE BIOPSY: CPT

## 2021-10-14 PROCEDURE — 25010000002 FENTANYL CITRATE (PF) 50 MCG/ML SOLUTION: Performed by: RADIOLOGY

## 2021-10-14 PROCEDURE — 25010000003 LIDOCAINE 1 % SOLUTION: Performed by: RADIOLOGY

## 2021-10-14 PROCEDURE — 85610 PROTHROMBIN TIME: CPT

## 2021-10-14 PROCEDURE — 88342 IMHCHEM/IMCYTCHM 1ST ANTB: CPT | Performed by: INTERNAL MEDICINE

## 2021-10-14 PROCEDURE — 88341 IMHCHEM/IMCYTCHM EA ADD ANTB: CPT | Performed by: INTERNAL MEDICINE

## 2021-10-14 PROCEDURE — 25010000002 MIDAZOLAM PER 1 MG: Performed by: RADIOLOGY

## 2021-10-14 PROCEDURE — 88307 TISSUE EXAM BY PATHOLOGIST: CPT | Performed by: INTERNAL MEDICINE

## 2021-10-14 RX ORDER — SODIUM CHLORIDE 0.9 % (FLUSH) 0.9 %
3 SYRINGE (ML) INJECTION EVERY 12 HOURS SCHEDULED
Status: DISCONTINUED | OUTPATIENT
Start: 2021-10-14 | End: 2021-10-15 | Stop reason: HOSPADM

## 2021-10-14 RX ORDER — SODIUM CHLORIDE 9 MG/ML
25 INJECTION, SOLUTION INTRAVENOUS ONCE
Status: DISCONTINUED | OUTPATIENT
Start: 2021-10-14 | End: 2021-10-15 | Stop reason: HOSPADM

## 2021-10-14 RX ORDER — FENTANYL CITRATE 50 UG/ML
INJECTION, SOLUTION INTRAMUSCULAR; INTRAVENOUS
Status: COMPLETED | OUTPATIENT
Start: 2021-10-14 | End: 2021-10-14

## 2021-10-14 RX ORDER — SODIUM CHLORIDE 0.9 % (FLUSH) 0.9 %
10 SYRINGE (ML) INJECTION AS NEEDED
Status: DISCONTINUED | OUTPATIENT
Start: 2021-10-14 | End: 2021-10-15 | Stop reason: HOSPADM

## 2021-10-14 RX ORDER — LIDOCAINE HYDROCHLORIDE 10 MG/ML
20 INJECTION, SOLUTION INFILTRATION; PERINEURAL ONCE
Status: COMPLETED | OUTPATIENT
Start: 2021-10-14 | End: 2021-10-14

## 2021-10-14 RX ORDER — MIDAZOLAM HYDROCHLORIDE 1 MG/ML
INJECTION INTRAMUSCULAR; INTRAVENOUS
Status: COMPLETED | OUTPATIENT
Start: 2021-10-14 | End: 2021-10-14

## 2021-10-14 RX ADMIN — MIDAZOLAM 1 MG: 1 INJECTION INTRAMUSCULAR; INTRAVENOUS at 11:25

## 2021-10-14 RX ADMIN — MIDAZOLAM 1 MG: 1 INJECTION INTRAMUSCULAR; INTRAVENOUS at 11:17

## 2021-10-14 RX ADMIN — LIDOCAINE HYDROCHLORIDE 20 ML: 10 INJECTION, SOLUTION INFILTRATION; PERINEURAL at 11:20

## 2021-10-14 RX ADMIN — FENTANYL CITRATE 25 MCG: 0.05 INJECTION, SOLUTION INTRAMUSCULAR; INTRAVENOUS at 11:20

## 2021-10-14 RX ADMIN — FENTANYL CITRATE 25 MCG: 0.05 INJECTION, SOLUTION INTRAMUSCULAR; INTRAVENOUS at 11:17

## 2021-10-14 NOTE — DISCHARGE INSTRUCTIONS

## 2021-10-14 NOTE — NURSING NOTE
Pt in xray triage for Bone Deep Bx.  Pt and  wearing mask and RN wearing mask and eye protection for all pt interactions.

## 2021-10-22 ENCOUNTER — LAB (OUTPATIENT)
Dept: LAB | Facility: HOSPITAL | Age: 56
End: 2021-10-22

## 2021-10-22 ENCOUNTER — OFFICE VISIT (OUTPATIENT)
Dept: ONCOLOGY | Facility: CLINIC | Age: 56
End: 2021-10-22

## 2021-10-22 VITALS
RESPIRATION RATE: 16 BRPM | TEMPERATURE: 98.4 F | HEART RATE: 100 BPM | OXYGEN SATURATION: 96 % | WEIGHT: 181.5 LBS | BODY MASS INDEX: 32.16 KG/M2 | DIASTOLIC BLOOD PRESSURE: 78 MMHG | HEIGHT: 63 IN | SYSTOLIC BLOOD PRESSURE: 114 MMHG

## 2021-10-22 DIAGNOSIS — C34.12 PRIMARY ADENOCARCINOMA OF UPPER LOBE OF LEFT LUNG (HCC): ICD-10-CM

## 2021-10-22 DIAGNOSIS — Z17.0 MALIGNANT NEOPLASM OF UPPER-OUTER QUADRANT OF RIGHT BREAST IN FEMALE, ESTROGEN RECEPTOR POSITIVE (HCC): ICD-10-CM

## 2021-10-22 DIAGNOSIS — Z17.0 MALIGNANT NEOPLASM OF UPPER-INNER QUADRANT OF LEFT BREAST IN FEMALE, ESTROGEN RECEPTOR POSITIVE (HCC): ICD-10-CM

## 2021-10-22 DIAGNOSIS — C50.411 MALIGNANT NEOPLASM OF UPPER-OUTER QUADRANT OF RIGHT BREAST IN FEMALE, ESTROGEN RECEPTOR POSITIVE (HCC): ICD-10-CM

## 2021-10-22 DIAGNOSIS — C34.12 PRIMARY ADENOCARCINOMA OF UPPER LOBE OF LEFT LUNG (HCC): Primary | ICD-10-CM

## 2021-10-22 DIAGNOSIS — C50.212 MALIGNANT NEOPLASM OF UPPER-INNER QUADRANT OF LEFT BREAST IN FEMALE, ESTROGEN RECEPTOR POSITIVE (HCC): ICD-10-CM

## 2021-10-22 LAB
ALBUMIN SERPL-MCNC: 4.2 G/DL (ref 3.5–5.2)
ALBUMIN/GLOB SERPL: 1.1 G/DL (ref 1.1–2.4)
ALP SERPL-CCNC: 110 U/L (ref 38–116)
ALT SERPL W P-5'-P-CCNC: 13 U/L (ref 0–33)
ANION GAP SERPL CALCULATED.3IONS-SCNC: 11 MMOL/L (ref 5–15)
AST SERPL-CCNC: 14 U/L (ref 0–32)
BASOPHILS # BLD AUTO: 0.08 10*3/MM3 (ref 0–0.2)
BASOPHILS NFR BLD AUTO: 0.7 % (ref 0–1.5)
BILIRUB SERPL-MCNC: <0.2 MG/DL (ref 0.2–1.2)
BUN SERPL-MCNC: 14 MG/DL (ref 6–20)
BUN/CREAT SERPL: 14 (ref 7.3–30)
CALCIUM SPEC-SCNC: 9.9 MG/DL (ref 8.5–10.2)
CHLORIDE SERPL-SCNC: 99 MMOL/L (ref 98–107)
CO2 SERPL-SCNC: 27 MMOL/L (ref 22–29)
CREAT SERPL-MCNC: 1 MG/DL (ref 0.6–1.1)
DEPRECATED RDW RBC AUTO: 44.9 FL (ref 37–54)
EOSINOPHIL # BLD AUTO: 0.32 10*3/MM3 (ref 0–0.4)
EOSINOPHIL NFR BLD AUTO: 2.7 % (ref 0.3–6.2)
ERYTHROCYTE [DISTWIDTH] IN BLOOD BY AUTOMATED COUNT: 13.1 % (ref 12.3–15.4)
GFR SERPL CREATININE-BSD FRML MDRD: 57 ML/MIN/1.73
GLOBULIN UR ELPH-MCNC: 3.7 GM/DL (ref 1.8–3.5)
GLUCOSE SERPL-MCNC: 103 MG/DL (ref 74–124)
HCT VFR BLD AUTO: 33.7 % (ref 34–46.6)
HGB BLD-MCNC: 10.2 G/DL (ref 12–15.9)
IMM GRANULOCYTES # BLD AUTO: 0.15 10*3/MM3 (ref 0–0.05)
IMM GRANULOCYTES NFR BLD AUTO: 1.3 % (ref 0–0.5)
LYMPHOCYTES # BLD AUTO: 1.05 10*3/MM3 (ref 0.7–3.1)
LYMPHOCYTES NFR BLD AUTO: 8.9 % (ref 19.6–45.3)
MCH RBC QN AUTO: 28.3 PG (ref 26.6–33)
MCHC RBC AUTO-ENTMCNC: 30.3 G/DL (ref 31.5–35.7)
MCV RBC AUTO: 93.4 FL (ref 79–97)
MONOCYTES # BLD AUTO: 1.12 10*3/MM3 (ref 0.1–0.9)
MONOCYTES NFR BLD AUTO: 9.4 % (ref 5–12)
NEUTROPHILS NFR BLD AUTO: 77 % (ref 42.7–76)
NEUTROPHILS NFR BLD AUTO: 9.14 10*3/MM3 (ref 1.7–7)
NRBC BLD AUTO-RTO: 0 /100 WBC (ref 0–0.2)
PLATELET # BLD AUTO: 409 10*3/MM3 (ref 140–450)
PMV BLD AUTO: 8.4 FL (ref 6–12)
POTASSIUM SERPL-SCNC: 5 MMOL/L (ref 3.5–4.7)
PROT SERPL-MCNC: 7.9 G/DL (ref 6.3–8)
RBC # BLD AUTO: 3.61 10*6/MM3 (ref 3.77–5.28)
SODIUM SERPL-SCNC: 137 MMOL/L (ref 134–145)
WBC # BLD AUTO: 11.86 10*3/MM3 (ref 3.4–10.8)

## 2021-10-22 PROCEDURE — 80053 COMPREHEN METABOLIC PANEL: CPT

## 2021-10-22 PROCEDURE — 36415 COLL VENOUS BLD VENIPUNCTURE: CPT

## 2021-10-22 PROCEDURE — 99215 OFFICE O/P EST HI 40 MIN: CPT | Performed by: INTERNAL MEDICINE

## 2021-10-22 PROCEDURE — 85025 COMPLETE CBC W/AUTO DIFF WBC: CPT

## 2021-10-22 RX ORDER — EXEMESTANE 25 MG/1
25 TABLET ORAL DAILY
Qty: 30 TABLET | Refills: 5 | Status: SHIPPED | OUTPATIENT
Start: 2021-10-22 | End: 2021-10-29

## 2021-10-22 NOTE — PROGRESS NOTES
Subjective   Holli Patel is a 56 y.o. female.  Referred by Dr. 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    3/1/2021-bilateral screening mammogram-microcalcifications seen in the posterior one third of the lateral aspect of the right breast.  Area of focal asymmetry seen in the middle one third of the upper inner quadrant of the left breast.    3/1/2021-DEXA scan-T score of -2.2 on the lumbar spine and T score of -2.3 in the left hip and T score of -1.9 in the right hip.  Findings consistent with osteopenia    3/23/2021-screening lung CT-there is a 10 x 11 mm solid nodule in the left upper lobe.  Enlarged AP window lymph node measuring 14 x 10 mm.  Suggestion of a possible 9 mm left hilar lymph node.  Heavy coronary artery calcification.    3/23/2021-diagnostic mammogram bilateral-cluster of microcalcifications in the middle third lateral aspect of the right breast.  Left breast demonstrates persistence of the area of focal asymmetry in the region.    Ultrasound-left breast ultrasound at 10 o'clock position, 6 cm from the nipple there is a 0.4 cm irregular hypoechoic lesion.  Stereotactic biopsy of the right breast calcifications recommended.  Ultrasound-guided biopsy of the left breast lesion recommended    4/7/2021-right breast stereotactic biopsy and left breast ultrasound-guided biopsy  Pathology  Right breast-invasive ductal carcinoma, grade 2, lymphovascular invasion present, ER +99% strong, AR +80% moderate, HER-2 negative, Ki-67 12%  Left breast upper inner quadrant 10 o'clock position biopsy, invasive ductal carcinoma, grade   2, ER +99% strong, AR +40% strong, HER-2 2+ on immunohistochemistry, nonamplified on FISH Ki-67 10%    4/14/2021-PET/CT-FDG avid aortopulmonary window lymph node measures 0.9 cm with an SUV of 7.5.  FDG avid left hilar lymph node  measures 1 cm with an SUV of 17.2.  Irregular soft tissue density in the right breast measuring 3.7 x 3.8 cm is favored to be secondary to the recent breast biopsy.  1.1 cm pulmonary nodule within the left upper lobe with SUV 9.7 .  Sub-6 mm pulmonary nodules are present in the right lower lobe.  No evidence of lymphadenopathy or metastatic disease in the abdomen.  Focal area of FDG uptake within the central canal posterior to T11-T12 displaced demonstrating an SUV of 5.5 thought to be reactive however MRI is recommended for further evaluation.    She was seen by Dr. Molina who initially planned for breast surgery however  due to the PET abnormalities she has been referred to Dr. Kerr who plans to do a wound on 4/30/2021.  Dr. Molina's and Dr. Kerr's notes reviewed.    Patient denies any family history of breast cancer.  Her mother had lymphoma.  Maternal grandmother had colon cancer.  Prior to that screening mammogram she did not have any palpable abnormalities of either breast.    She has been a heavy smoker for about 40 years and smoked 2 packs/day.  Denies any recent weight changes, new bone pains, cough, abdominal pain nausea vomiting constipation or diarrhea.  She does have anxiety and has been on several medications.  She has recently been started on Xanax to help with the mood and also with insomnia.    Patient had a video-assisted thoracoscopy and mediastinal lymphadenectomy on 4/30/2021.  L5-L6 lymph nodes were biopsied however the small pulmonary nodule in the left upper lobe was not difficult to find.  Pathology showed moderately differentiated adenocarcinoma which is CK7 and TTF-1 positive.  Consistent with lung primary.  Ki-67 85%.    5/14/2021-MRI of the brain-minimal chronic small vessel ischemic change in the white matter.  Otherwise negative MRI.    5/20/2021-bilateral axillary ultrasound-no evidence of axillary lymphadenopathy in either axilla.  Normal-appearing bilateral axillary lymph  nodes visualized.    Cycle 1 cisplatin and Alimta on 5/25/2021.    Cycle 2 cisplatin Alimta 6/15/2021    Cycle 3 cisplatin Alimta 7/7/2021    Completed radiation on 7/12/2021    Port was nonfunctional hence a port study was performed which showed that the port was backed up into the innominate vein and also there was a nonocclusive thrombus at the end of the catheter extending into the superior vena cava.  She was started on anticoagulation with Eliquis.  It was recommended for port revision of the intention is to keep the port.    PET/CT 8/5/2021-images independently reviewed and interpreted by me-decrease in size and FDG uptake of the left upper lobe pulmonary nodule as well as mediastinal and left hilar lymphadenopathy representing response to treatment.  Sub-6 mm pulmonary nodule in the left upper lobe new since 4/14/2021.  This could be related to radiation however follow-up CT in 3 months recommended.  Decrease in size of the irregular masslike tissue in the right breast which is postbiopsy hematoma.  This is not PET avid.  New segmental left eighth rib fractures healing.  A new band of sclerosis of the left seventh rib which favored to represent healing nondisplaced fracture.  Follow-up CT recommended.  focal uptake in the duodenum first and second portions.  Could be related to duodenitis.  Indeterminate lesion in the L1 vertebral body not well evaluated on PET/CT.    10/1/2021-MRI of the lumbar spine.  Lesion in the left posterior body of L1 measures 14 x 14 x 12 mm and previously 5 x 5 x 6 mm.  The interval enlargement strongly suggest that it is metastatic lesion.  No additional osseous metastasis noted.   Other chronic changes noted.    10/14/2021-biopsy of the lumbar spine lesion-pathology consistent with poorly differentiated carcinoma.  The staining is similar to the prior tumors and fevers this being metastatic poorly differentiated pulmonary adenocarcinoma.    Interval history  Ms. Patel presents  to the clinic today to discuss the biopsy reports and further recommendations.  She had a bout of congestion and cold which improved.  She is accompanied by her .  She has met with Dr. Molina and it has been decided to wait till this biopsy to determine further course of action for the breast.    The following portions of the patient's history were reviewed and updated as appropriate: allergies, current medications, past family history, past medical history, past social history, past surgical history and problem list.    Past Medical History:   Diagnosis Date   • Anxiety    • Dyspnea on exertion    • SHAYY (generalized anxiety disorder)     RELATED HIGH BLOOD PRESSURE   • GERD (gastroesophageal reflux disease)    • High blood pressure     ANXIETY RELATED   • Hyperlipidemia    • Hypothyroidism    • Insomnia    • Lung cancer (HCC)    • Lung nodule    • Malignant neoplasm of upper-outer quadrant of right breast in female, estrogen receptor positive (HCC) 4/13/2021   • Migraine    • PONV (postoperative nausea and vomiting)         Past Surgical History:   Procedure Laterality Date   • BREAST BIOPSY Bilateral 04/07/2021    Right Breast 10 o'clock & Left breast 10 o'clock 6 cm from nipple, [facility]   • CLOSED REDUCTION HIP DISLOCATION Left 4/30/2021    Procedure: BRONCHOSCOPY, LEFT VIDEO ASSITED THORACOSCOPY, ROBOTIC ASSSITED MEDIASTINAL LYMPHADENECTOMY WITH INTERCOSTAL NERVE BLOCKS;  Surgeon: Raphael Kerr III, MD;  Location: [facility] OR;  Service: DaVinci;  Laterality: Left;   • COLONOSCOPY     • VENOUS ACCESS DEVICE (PORT) INSERTION Right 5/20/2021    Procedure: INSERTION VENOUS ACCESS DEVICE;  Surgeon: Raphael Kerr III, MD;  Location: [facility] OR;  Service: Thoracic;  Laterality: Right;   • WRIST SURGERY Right         Family History   Problem Relation Age of Onset   • Cancer Father         LYMPHATIC   • Alcohol abuse Brother    • Colon cancer Maternal Grandmother    • Malig Hyperthermia Neg Hx          Social History     Socioeconomic History   • Marital status:    Tobacco Use   • Smoking status: Current Every Day Smoker     Packs/day: 1.00     Types: Electronic Cigarette, Cigarettes     Start date: 1981   • Smokeless tobacco: Never Used   • Tobacco comment: ABT 15 CIGARETTES DAILY   Vaping Use   • Vaping Use: Some days   • Substances: Nicotine, Flavoring   • Devices: Disposable   Substance and Sexual Activity   • Alcohol use: Never   • Drug use: Never   • Sexual activity: Yes     Partners: Male        OB History        2    Para   2    Term   2            AB        Living           SAB        IAB        Ectopic        Molar        Multiple        Live Births                Age of menarche-10  Age at menopause-42  Oral contraceptive pill use-15 years  No HRT use  She has 2 children  Age at first live childbirth-17    No Known Allergies     Review of Systems   Review of systems as mentioned in the HPI    Objective   not currently breastfeeding.       Physical Exam  Vitals reviewed.   HENT:      Head: Normocephalic.   Eyes:      Extraocular Movements: Extraocular movements intact.      Conjunctiva/sclera: Conjunctivae normal.      Pupils: Pupils are equal, round, and reactive to light.   Cardiovascular:      Rate and Rhythm: Normal rate and regular rhythm.      Pulses: Normal pulses.      Heart sounds: Normal heart sounds.   Pulmonary:      Effort: Pulmonary effort is normal.      Breath sounds: Normal breath sounds.   Abdominal:      General: Abdomen is flat. Bowel sounds are normal. There is no distension.      Palpations: There is no mass.   Musculoskeletal:         General: No swelling. Normal range of motion.      Cervical back: Normal range of motion.   Skin:     General: Skin is warm.   Neurological:      General: No focal deficit present.      Mental Status: She is alert and oriented to person, place, and time.   Psychiatric:         Mood and Affect: Mood normal.          Behavior: Behavior normal.     Breast Exam: Right breast with a large postbiopsy hematoma in the upper outer quadrant measuring 4 cm.    No other palpable abnormalities.  Left breast with no palpable abnormalities.  No right or left axillary lymphadenopathy    I have reexamined the patient and the results are consistent with the previously documented exam. Salma Snell MD     Lab on 10/22/2021   Component Date Value Ref Range Status   • WBC 10/22/2021 11.86* 3.40 - 10.80 10*3/mm3 Final   • RBC 10/22/2021 3.61* 3.77 - 5.28 10*6/mm3 Final   • Hemoglobin 10/22/2021 10.2* 12.0 - 15.9 g/dL Final   • Hematocrit 10/22/2021 33.7* 34.0 - 46.6 % Final   • MCV 10/22/2021 93.4  79.0 - 97.0 fL Final   • MCH 10/22/2021 28.3  26.6 - 33.0 pg Final   • MCHC 10/22/2021 30.3* 31.5 - 35.7 g/dL Final   • RDW 10/22/2021 13.1  12.3 - 15.4 % Final   • RDW-SD 10/22/2021 44.9  37.0 - 54.0 fl Final   • MPV 10/22/2021 8.4  6.0 - 12.0 fL Final   • Platelets 10/22/2021 409  140 - 450 10*3/mm3 Final   • Neutrophil % 10/22/2021 77.0* 42.7 - 76.0 % Final   • Lymphocyte % 10/22/2021 8.9* 19.6 - 45.3 % Final   • Monocyte % 10/22/2021 9.4  5.0 - 12.0 % Final   • Eosinophil % 10/22/2021 2.7  0.3 - 6.2 % Final   • Basophil % 10/22/2021 0.7  0.0 - 1.5 % Final   • Immature Grans % 10/22/2021 1.3* 0.0 - 0.5 % Final   • Neutrophils, Absolute 10/22/2021 9.14* 1.70 - 7.00 10*3/mm3 Final   • Lymphocytes, Absolute 10/22/2021 1.05  0.70 - 3.10 10*3/mm3 Final   • Monocytes, Absolute 10/22/2021 1.12* 0.10 - 0.90 10*3/mm3 Final   • Eosinophils, Absolute 10/22/2021 0.32  0.00 - 0.40 10*3/mm3 Final   • Basophils, Absolute 10/22/2021 0.08  0.00 - 0.20 10*3/mm3 Final   • Immature Grans, Absolute 10/22/2021 0.15* 0.00 - 0.05 10*3/mm3 Final   • nRBC 10/22/2021 0.0  0.0 - 0.2 /100 WBC Final   Hospital Outpatient Visit on 10/14/2021   Component Date Value Ref Range Status   • Platelets 10/14/2021 371  140 - 450 10*3/mm3 Final   • Protime 10/14/2021 13.1  12.8 - 15.2  seconds Final    Serial Number: [id number]:  [id number]   • INR 10/14/2021 1.1  0.8 - 1.2 Final   • Addendum 10/14/2021    Final                    Value:This result contains rich text formatting which cannot be displayed here.   • Case Report 10/14/2021    Final                    Value:Surgical Pathology Report                         Case: [id number]                                  Authorizing Provider:  Salma Snell MD       Collected:           10/14/2021 11:40 AM          Ordering Location:     [facility]  Received:            10/14/2021 11:59 AM                                 CT                                                                           Pathologist:           Raphael Neri MD                                                         Specimen:    Spine, Lumbar, ct guided bone lesion L1                                                   • Clinical Information 10/14/2021    Final                    Value:This result contains rich text formatting which cannot be displayed here.   • Final Diagnosis 10/14/2021    Final                    Value:This result contains rich text formatting which cannot be displayed here.   • Comment 10/14/2021    Final                    Value:This result contains rich text formatting which cannot be displayed here.   • Gross Description 10/14/2021    Final                    Value:This result contains rich text formatting which cannot be displayed here.   • Special Stains 10/14/2021    Final                    Value:This result contains rich text formatting which cannot be displayed here.   • WBC 10/14/2021 13.10* 3.40 - 10.80 10*3/mm3 Final   • RBC 10/14/2021 3.66* 3.77 - 5.28 10*6/mm3 Final   • Hemoglobin 10/14/2021 10.4* 12.0 - 15.9 g/dL Final   • Hematocrit 10/14/2021 32.6* 34.0 - 46.6 % Final   • MCV 10/14/2021 89.1  79.0 - 97.0 fL Final   • MCH 10/14/2021 28.4  26.6 - 33.0 pg Final   • MCHC 10/14/2021 31.9  31.5 - 35.7 g/dL Final   • RDW  10/14/2021 12.6  12.3 - 15.4 % Final   • RDW-SD 10/14/2021 41.1  37.0 - 54.0 fl Final   • MPV 10/14/2021 9.5  6.0 - 12.0 fL Final   • Platelets 10/14/2021 391  140 - 450 10*3/mm3 Final   • Neutrophil % 10/14/2021 79.9* 42.7 - 76.0 % Final   • Lymphocyte % 10/14/2021 7.4* 19.6 - 45.3 % Final   • Monocyte % 10/14/2021 9.9  5.0 - 12.0 % Final   • Eosinophil % 10/14/2021 1.8  0.3 - 6.2 % Final   • Basophil % 10/14/2021 0.5  0.0 - 1.5 % Final   • Immature Grans % 10/14/2021 0.5  0.0 - 0.5 % Final   • Neutrophils, Absolute 10/14/2021 10.47* 1.70 - 7.00 10*3/mm3 Final   • Lymphocytes, Absolute 10/14/2021 0.97  0.70 - 3.10 10*3/mm3 Final   • Monocytes, Absolute 10/14/2021 1.30* 0.10 - 0.90 10*3/mm3 Final   • Eosinophils, Absolute 10/14/2021 0.23  0.00 - 0.40 10*3/mm3 Final   • Basophils, Absolute 10/14/2021 0.07  0.00 - 0.20 10*3/mm3 Final   • Immature Grans, Absolute 10/14/2021 0.06* 0.00 - 0.05 10*3/mm3 Final   • nRBC 10/14/2021 0.0  0.0 - 0.2 /100 WBC Final   Lab on 10/06/2021   Component Date Value Ref Range Status   • Glucose 10/06/2021 98  74 - 124 mg/dL Final   • BUN 10/06/2021 14  6 - 20 mg/dL Final   • Creatinine 10/06/2021 0.98  0.60 - 1.10 mg/dL Final   • Sodium 10/06/2021 137  134 - 145 mmol/L Final   • Potassium 10/06/2021 4.7  3.5 - 4.7 mmol/L Final   • Chloride 10/06/2021 99  98 - 107 mmol/L Final   • CO2 10/06/2021 27.9  22.0 - 29.0 mmol/L Final   • Calcium 10/06/2021 9.7  8.5 - 10.2 mg/dL Final   • Total Protein 10/06/2021 7.5  6.3 - 8.0 g/dL Final   • Albumin 10/06/2021 4.30  3.50 - 5.20 g/dL Final   • ALT (SGPT) 10/06/2021 10  0 - 33 U/L Final   • AST (SGOT) 10/06/2021 15  0 - 32 U/L Final   • Alkaline Phosphatase 10/06/2021 107  38 - 116 U/L Final   • Total Bilirubin 10/06/2021 <0.2* 0.2 - 1.2 mg/dL Final   • eGFR Non African Amer 10/06/2021 58* >60 mL/min/1.73 Final   • Globulin 10/06/2021 3.2  1.8 - 3.5 gm/dL Final   • A/G Ratio 10/06/2021 1.3  1.1 - 2.4 g/dL Final   • BUN/Creatinine Ratio 10/06/2021  14.3  7.3 - 30.0 Final   • Anion Gap 10/06/2021 10.1  5.0 - 15.0 mmol/L Final   • WBC 10/06/2021 9.54  3.40 - 10.80 10*3/mm3 Final   • RBC 10/06/2021 3.83  3.77 - 5.28 10*6/mm3 Final   • Hemoglobin 10/06/2021 11.0* 12.0 - 15.9 g/dL Final   • Hematocrit 10/06/2021 35.8  34.0 - 46.6 % Final   • MCV 10/06/2021 93.5  79.0 - 97.0 fL Final   • MCH 10/06/2021 28.7  26.6 - 33.0 pg Final   • MCHC 10/06/2021 30.7* 31.5 - 35.7 g/dL Final   • RDW 10/06/2021 13.2  12.3 - 15.4 % Final   • RDW-SD 10/06/2021 45.0  37.0 - 54.0 fl Final   • MPV 10/06/2021 8.6  6.0 - 12.0 fL Final   • Platelets 10/06/2021 289  140 - 450 10*3/mm3 Final   • Neutrophil % 10/06/2021 72.4  42.7 - 76.0 % Final   • Lymphocyte % 10/06/2021 11.5* 19.6 - 45.3 % Final   • Monocyte % 10/06/2021 12.2* 5.0 - 12.0 % Final   • Eosinophil % 10/06/2021 2.5  0.3 - 6.2 % Final   • Basophil % 10/06/2021 0.6  0.0 - 1.5 % Final   • Immature Grans % 10/06/2021 0.8* 0.0 - 0.5 % Final   • Neutrophils, Absolute 10/06/2021 6.90  1.70 - 7.00 10*3/mm3 Final   • Lymphocytes, Absolute 10/06/2021 1.10  0.70 - 3.10 10*3/mm3 Final   • Monocytes, Absolute 10/06/2021 1.16* 0.10 - 0.90 10*3/mm3 Final   • Eosinophils, Absolute 10/06/2021 0.24  0.00 - 0.40 10*3/mm3 Final   • Basophils, Absolute 10/06/2021 0.06  0.00 - 0.20 10*3/mm3 Final   • Immature Grans, Absolute 10/06/2021 0.08* 0.00 - 0.05 10*3/mm3 Final   • nRBC 10/06/2021 0.0  0.0 - 0.2 /100 WBC Final   Hospital Outpatient Visit on 10/01/2021   Component Date Value Ref Range Status   • Creatinine 10/01/2021 2.30* 0.60 - 1.30 mg/dL Final    Serial Number: [id number]:  [id number]   • Creatinine 10/01/2021 2.10* 0.60 - 1.30 mg/dL Final    Serial Number: [id number]:  [id number]        MRI Lumbar Spine Without Contrast    Result Date: 10/5/2021  1. In the last 4 months since prior lumbar spine MRI 05/27/2021, there has been interval enlargement of the rounded T1 hypointense T2 hyperintense lesion in the left posterior body of L1, on  05/27/2020 it measured 6 x 5 x 6 mm, and on the current exam it measures 14 x 14 x 12 mm, and its interval enlargement strongly suggests that it is a metastatic lesion. No additional osseous metastases are seen in the lumbar spine. 2. The remainder of the lumbar spine MRI is unchanged. There is mild lumbar lower lumbar spondylosis. At L4-L5, there is mild right and mild-to-moderate left facet overgrowth and posterior central disc bulge or small disc protrusion that only minimally narrows the thecal sac and there is mild bilateral foraminal narrowing at L4-L5. At L5-S1, there is moderate bilateral facet overgrowth and a 4-5 mm degenerative anterolisthesis of L5 with respect to S1. There is some bilateral lateral recess narrowing could affect the traversing S1 nerve roots. There is loss of vertical height of the foramina and bulging disc material into the inferior aspect of the foramina, and synovial thickening off the facets extending into the posterior foramina, and there is mild-to-moderate bilateral foraminal narrowing at L5-S1.  This report was finalized on 10/5/2021 3:34 PM by Dr. Raphael Kingston M.D.       CBC 8/25/2021-WBC 7.76, hemoglobin 10.8, platelets 255    8/24/2021 bilateral diagnostic mammogram and bilateral ultrasound-images independently reviewed and interpreted by me  In the left breast there is a 1 cm focal asymmetry.  Previously measured 0.8 cm on 3/1/2021.  1.4 cm group of calcifications adjacent to the superior lateral margin of the biopsy clip which are new.    In the right breast there is a 3.5 cm postbiopsy hematoma.  This has decreased in size.  Loosely grouped residual calcifications measuring up to 2.5 cm.  These have decreased in number compared to 3/23/2021.  However the extent measures slightly larger could be due to superimposed hematoma.    Ultrasound  At 10 o'clock position of the left breast, 6 cm from the nipple there is an echogenic focus from the biopsy clip and the malignancy  measures 0.4 x 0.3 cm.    10/6/2021-WBC 9.54, hemoglobin 11, platelets 289,000  CMP-BUN 14, creatinine 0.98, LFTs normal    10/1/2021-MRI of the lumbar spine 10/1/2021-images independently reviewed and interpreted by me.  There has been an interval enlargement in the rounded T1 hypointense, T2 hyperintense lesion in the left posterior body of L1.  Previously measured 6 x 5 x 6 mm and now measures 14 x 14 x 12 mm.  This is highly concerning for osseous metastasis.  No other evidence of bony metastasis noted.  Other chronic changes.      10/22/2020  CBC-WBC 11.86, hemoglobin 10.2, platelets 409,000  CMP-BUN 14, creatinine 1, potassium 5      Assessment/Plan      *Bilateral breast cancer  · Invasive ductal carcinoma in both right and left breast and lesions measure under 1 cm.  No palpable lymphadenopathy although unsure if this has been evaluated by an axillary ultrasound.  No abnormal axillary lymphadenopathy noted on PET.  · Most likely clinical T1b N0 M0, both cancers were noted to be grade 2, ER/FL positive and HER-2 negative and Ki-67 less than 15%  VATS on 4/30/2021  Biopsy confirms adenocarcinoma of pulmonary primary  Discussed with Dr. Molina about delaying breast surgery and she is in agreement  Darted on neoadjuvant anastrozole  · Patient experienced significant adverse effects with anastrozole  · Treatment changed to letrozole   · Axillary ultrasound 4/20/2021 -negative for any axillary lymphadenopathy  · Started letrozole in May 2021  · She continues on letrozole without any problems.  · 8/24/2021 bilateral diagnostic mammogram and ultrasound.  In the left breast the tumor now measures 1 cm on mammogram as opposed to 0.8 cm in March.  Right breast calcifications also noted to be in a large extent although the number seems to be decreased.  Hematoma has decreased in size.  · She continues on letrozole.  · This is a very difficult situation with she now being determined to have stage IV lung  cancer.  · Given that the calcifications have increased in extent, I plan to obtain repeat diagnostic mammogram and ultrasound to evaluate the cancers.  · If the breast imaging remained stable then one option would be to continue on an AI for another 6 months and reassess the stability of her lung lesion.  · If everything is stable from her lung cancer standpoint then we can proceed with a lumpectomy if not we could just continue on indefinite AI.  · Discussed this with Ms. Patel and her .  · Case will also be presented at the multidisciplinary conference and a final plan will be made.      *Adenocarcinoma of the left lung  · Most likely stage T1 N2 M0, stage III  · MRI of the thoracic spine ordered to further evaluate the abnormality seen on PET  · MRI of the brain negative   · Given that she is only 55 and fairly good performance status I discussed with her by with concurrent chemoradiation with cisplatin and Alimta every 3 weeks followed by durvalumab.  · Adverse effects including but not limited to myelosuppression, increased risk of infections, nausea, vomiting, renal dysfunction, ototoxicity, neurotoxicity have been discussed at length.  · MRI of the thoracic spine performed 5/24/2021 shows no evidence of metastatic disease in the thoracic spine however in L1 there is a 7 mm lesion which is indeterminate.  A lumbar spine MRI has been recommended.  · Discussed her case with Dr. Oates and he feels like MRI of the lumbar spine may also show that this lesion is indeterminate.  · Discussed the same with the patient and her .  · Even if this is metastatic disease this might be the only lesion of metastatic disease and then would consider this oligometastatic disease.  · Therefore plan to proceed with concurrent chemoradiation as scheduled.  · cisplatin and Alimta, C1D1 5/25/2021  · Day 1 of radiation 5/25/2021  · 5/27/2021-MRI of the lumbar spine-there is a 6 x 5 x 6 mm enhancing lesion in the left  posterior body of L1 vertebra.  This is considered indeterminate.  Follow-up recommended.  Degenerative changes noted at L5-S1  · 7/7/2021, cycle 3 cisplatin Alimta  · 7/12/2021-radiation completed  · PET/CT 8/6/2021 with good response to chemoradiation.  · MRI of the lumbar spine 10/1/2021 with increase in size of the L1 lesion which now measures 14 mm as opposed to 6 mm in May 2021.  · 10/14/2021-biopsy of the L1 lesion and pathology consistent with adenocarcinoma of the lung, TPS 0  · I will obtain a PET/CT to rule out any other evidence of disease progression  · If this is the only site of disease progression we will plan to do radiation to the L1 vertebral lesion.  · I will change treatment to Keytruda and Alimta.  · Discontinue Imfinzi.  · Discussed the imaging and the plan at length with patient and her .  · We will also obtain MRI of the brain for staging    *Lumbar spine lesion-L1 lesion indeterminate.  MRI of the lumbar spine spine images independently reviewed and interpreted by me in detail summarized above.  Plan to repeat MRI in 4 months.  This will be due in September 2021  · MRI spine done on 2021 with increase in size of the L1 lesion  · Plan for biopsy of this lesion    *Bilateral breast cancer-no family history of breast cancer but given synchronous breast cancer genetic testing has been sent.  Genetic testing with a variant of unknown significance.    *Right hand numbness and difficulty with grasping objects  · Previously had history of carpal tunnel requiring repair.  · She is currently on letrozole which could cause worsening of the carpal tunnel  · Gabapentin dose will be increased to 600 mg 3 times daily  · She will also be referred to hand surgery for further evaluation.  · She now reports bilateral upper extremity numbness.  · Continue gabapentin  · MRI of the brain will be obtained    *Anxiety-  · Currently on Xanax 1 mg nightly  · She required Valium for the MRI as she had  claustrophobia and a panic attack  · Close follow-up with supportive oncology  · She has been very tearful at the visit today after learning about the progression of the adenocarcinoma of the lung    *Left-sided chest wall pain-secondary to VATS.  Most likely neuropathic.  Continue gabapentin and Norco as needed.  She ran out of gabapentin and experienced leg cramps.    Continue gabapentin      *Hypertension-continue lisinopril.  Blood pressure 114/78    *Smoking-not addressed today      *GERD  · 7/7/2021, patient complains of reflux despite the use of Prilosec 20 mg twice daily.  We will send her a prescription for Carafate slurry 4 times daily.  · She has not quite started using the Carafate.  · Continue Prilosec and recommend using Carafate  · PET/CT shows duodenitis      *Port not returning blood  · Patient reports the last treatment that difficulty getting blood return.  Today nursing was unable to get blood return and have placed Activase without success.  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  The end of the catheter has backed up into the innominate vein.  This was discussed with Dr. Snell and there is no need for additional imaging at this time.  WE WILL NOT USE THE PORT.   · She has been started on Eliquis.  Completed a week of 10 mg twice daily and now on 5 mg.  Continue the same  · We will obtain imaging to see if the disease is responded.  If patient wishes to get the port out then we will continue with anticoagulation for 3 months and remove the port.  However if she wishes to keep the port then a port revision will have to be performed.    · Continue Eliquis and remove port after 3 months  · Continue Eliquis at this time  · Repeat Doppler to assess clot  · We would need a new port since she has now been diagnosed with metastatic lung  cancer and will need to be on chemotherapy indefinitely.    PLAN:  1. Discontinue Imfinzi  2. Continue Eliquis 5 mg twice daily  3. PET/CT  4. MRI of the brain  5. Bilateral diagnostic mammogram and ultrasound  6. Continue letrozole 2.5 mg daily.    7. Continue omeprazole 20mg twice daily.  8. Continue Carafate 4 times daily.   9. Follow-up in 1 to 2 weeks    Patient is on treatment requiring close monitoring for toxicities.  She has been diagnosed with metastatic lung cancer today which is terminal.  It is life-threatening in the absence of adequate treatment.    65 minutes have been spent on the encounter including reviewing the medical records particularly all the images which have been performed in the past as well as the most recent images, face-to-face time and documentation on the same day.    Salma Snell MD

## 2021-10-25 ENCOUNTER — TELEPHONE (OUTPATIENT)
Dept: ONCOLOGY | Facility: CLINIC | Age: 56
End: 2021-10-25

## 2021-10-25 RX ORDER — DIAZEPAM 5 MG/1
TABLET ORAL
Qty: 1 TABLET | Refills: 0 | Status: SHIPPED | OUTPATIENT
Start: 2021-10-25 | End: 2021-11-26

## 2021-10-25 NOTE — TELEPHONE ENCOUNTER
Caller: Holli Patel    Relationship: Self    Best call back number: [phone]  What medications are you currently taking:   Current Outpatient Medications on File Prior to Visit   Medication Sig Dispense Refill   • ALPRAZolam (XANAX) 0.5 MG tablet Take 1 tablet by mouth 2 (Two) Times a Day As Needed for Anxiety. for anxiety 45 tablet 2   • apixaban (Eliquis) 5 MG tablet tablet Take 1 tablet by mouth Every 12 (Twelve) Hours. 60 tablet 5   • buPROPion XL (WELLBUTRIN XL) 300 MG 24 hr tablet Take 1 tablet by mouth Daily. 30 tablet 5   • dexamethasone (DECADRON) 4 MG tablet Take 1 tablet oral twice a day for 3 consecutive days beginning the day before chemotherapy and continue for 6 doses.Take with food. 6 tablet 3   • diazePAM (VALIUM) 5 MG tablet Take 1 tab 30 minutes prior to MRI 1 tablet 0   • exemestane (Aromasin) 25 MG chemo tablet Take 1 tablet by mouth Daily. 30 tablet 5   • FLUoxetine (PROzac) 40 MG capsule Take 1 capsule by mouth Daily. 90 capsule 1   • folic acid (FOLVITE) 1 MG tablet Take 1 tablet by mouth Daily. Start 7 days prior to chemotherapy until at least 3 weeks after all chemotherapy. 30 tablet 3   • gabapentin (NEURONTIN) 300 MG capsule One in am, 2 in pm 90 capsule 0   • gabapentin (NEURONTIN) 300 MG capsule Take 2 capsules by mouth 3 (Three) Times a Day. take 1 capsule by mouth every morning and take 2 capsules by mouth every night at bedtime 180 capsule 0   • levothyroxine (SYNTHROID, LEVOTHROID) 25 MCG tablet Take 1 tablet by mouth Daily. 90 tablet 1   • lisinopril (PRINIVIL,ZESTRIL) 20 MG tablet Take 1 tablet by mouth Daily. 90 tablet 1   • omeprazole (PrilOSEC) 20 MG capsule Take 1 capsule by mouth 2 (two) times a day. 180 capsule 0   • ondansetron (ZOFRAN) 8 MG tablet Take 1 tablet by mouth 3 (Three) Times a Day As Needed for Nausea or Vomiting. 30 tablet 5   • prochlorperazine (COMPAZINE) 10 MG tablet TAKE ONE TABLET BY MOUTH EVERY 8 HOURS AS NEEDED FOR NAUSEA AND VOMITING 30 tablet  2   • propranolol LA (Inderal LA) 60 MG 24 hr capsule Take 1 capsule by mouth Daily. (Patient taking differently: Take 60 mg by mouth Every Night.) 90 capsule 1   • rizatriptan (MAXALT) 10 MG tablet TAKE 1 TABLET BY MOUTH AT ONSET OF HEADACHE MAY REPEAT ONE TABLET IN 2  HOURS IF NEEDED 12 tablet 0   • Senexon-S 8.6-50 MG per tablet TAKE TWO TABLETS BY MOUTH DAILY 60 tablet 1   • simvastatin (ZOCOR) 20 MG tablet Take 1 tablet by mouth Daily. (Patient taking differently: Take 20 mg by mouth Every Night.) 90 tablet 1   • sucralfate (CARAFATE) 1 g tablet Take 1 tablet by mouth 4 (Four) Times a Day. 120 tablet 3     No current facility-administered medications on file prior to visit.      Which medication are you concerned about: VALIUM, LITTLE STRONGER THAN LAST TIME,PLEASE    Who prescribed you this medication: DR. NAM    What are your concerns: LOOKS LIKE IT WAS ORDERED BUT NO SENT TO PHARMACY AS PATIENT PICKED UP HER OTHER PRESP. ON SAT.

## 2021-10-25 NOTE — TELEPHONE ENCOUNTER
Patient calling inquiring whether her Valium had been ordered for MRI. Per KANDACE Overton Dr. needs to sign order.

## 2021-10-26 ENCOUNTER — HOSPITAL ENCOUNTER (OUTPATIENT)
Dept: MRI IMAGING | Facility: HOSPITAL | Age: 56
Discharge: HOME | End: 2021-10-26

## 2021-10-26 ENCOUNTER — HOSPITAL ENCOUNTER (OUTPATIENT)
Dept: ULTRASOUND IMAGING | Facility: HOSPITAL | Age: 56
Discharge: HOME | End: 2021-10-26

## 2021-10-26 ENCOUNTER — HOSPITAL ENCOUNTER (OUTPATIENT)
Dept: CARDIOLOGY | Facility: HOSPITAL | Age: 56
Discharge: HOME | End: 2021-10-26

## 2021-10-26 ENCOUNTER — HOSPITAL ENCOUNTER (OUTPATIENT)
Dept: MAMMOGRAPHY | Facility: HOSPITAL | Age: 56
Discharge: HOME | End: 2021-10-26

## 2021-10-26 DIAGNOSIS — Z17.0 MALIGNANT NEOPLASM OF UPPER-OUTER QUADRANT OF RIGHT BREAST IN FEMALE, ESTROGEN RECEPTOR POSITIVE (HCC): ICD-10-CM

## 2021-10-26 DIAGNOSIS — Z17.0 MALIGNANT NEOPLASM OF UPPER-INNER QUADRANT OF LEFT BREAST IN FEMALE, ESTROGEN RECEPTOR POSITIVE (HCC): ICD-10-CM

## 2021-10-26 DIAGNOSIS — C50.212 MALIGNANT NEOPLASM OF UPPER-INNER QUADRANT OF LEFT BREAST IN FEMALE, ESTROGEN RECEPTOR POSITIVE (HCC): ICD-10-CM

## 2021-10-26 DIAGNOSIS — C34.12 PRIMARY ADENOCARCINOMA OF UPPER LOBE OF LEFT LUNG (HCC): ICD-10-CM

## 2021-10-26 DIAGNOSIS — C50.411 MALIGNANT NEOPLASM OF UPPER-OUTER QUADRANT OF RIGHT BREAST IN FEMALE, ESTROGEN RECEPTOR POSITIVE (HCC): ICD-10-CM

## 2021-10-26 LAB
BH CV UPPER VENOUS LEFT INTERNAL JUGULAR AUGMENT: NORMAL
BH CV UPPER VENOUS LEFT INTERNAL JUGULAR COMPETENT: NORMAL
BH CV UPPER VENOUS LEFT INTERNAL JUGULAR COMPRESS: NORMAL
BH CV UPPER VENOUS LEFT INTERNAL JUGULAR PHASIC: NORMAL
BH CV UPPER VENOUS LEFT INTERNAL JUGULAR SPONT: NORMAL
BH CV UPPER VENOUS LEFT SUBCLAVIAN AUGMENT: NORMAL
BH CV UPPER VENOUS LEFT SUBCLAVIAN COMPETENT: NORMAL
BH CV UPPER VENOUS LEFT SUBCLAVIAN COMPRESS: NORMAL
BH CV UPPER VENOUS LEFT SUBCLAVIAN PHASIC: NORMAL
BH CV UPPER VENOUS LEFT SUBCLAVIAN SPONT: NORMAL
BH CV UPPER VENOUS RIGHT AXILLARY AUGMENT: NORMAL
BH CV UPPER VENOUS RIGHT AXILLARY COMPETENT: NORMAL
BH CV UPPER VENOUS RIGHT AXILLARY COMPRESS: NORMAL
BH CV UPPER VENOUS RIGHT AXILLARY PHASIC: NORMAL
BH CV UPPER VENOUS RIGHT AXILLARY SPONT: NORMAL
BH CV UPPER VENOUS RIGHT BASILIC FOREARM COMPRESS: NORMAL
BH CV UPPER VENOUS RIGHT BASILIC UPPER COMPRESS: NORMAL
BH CV UPPER VENOUS RIGHT BRACHIAL COMPRESS: NORMAL
BH CV UPPER VENOUS RIGHT CEPHALIC FOREARM COMPRESS: NORMAL
BH CV UPPER VENOUS RIGHT CEPHALIC UPPER COMPRESS: NORMAL
BH CV UPPER VENOUS RIGHT INTERNAL JUGULAR AUGMENT: NORMAL
BH CV UPPER VENOUS RIGHT INTERNAL JUGULAR COMPETENT: NORMAL
BH CV UPPER VENOUS RIGHT INTERNAL JUGULAR COMPRESS: NORMAL
BH CV UPPER VENOUS RIGHT INTERNAL JUGULAR PHASIC: NORMAL
BH CV UPPER VENOUS RIGHT INTERNAL JUGULAR SPONT: NORMAL
BH CV UPPER VENOUS RIGHT RADIAL COMPRESS: NORMAL
BH CV UPPER VENOUS RIGHT SUBCLAVIAN AUGMENT: NORMAL
BH CV UPPER VENOUS RIGHT SUBCLAVIAN COMPETENT: NORMAL
BH CV UPPER VENOUS RIGHT SUBCLAVIAN PHASIC: NORMAL
BH CV UPPER VENOUS RIGHT SUBCLAVIAN SPONT: NORMAL
BH CV UPPER VENOUS RIGHT ULNAR COMPRESS: NORMAL
MAXIMAL PREDICTED HEART RATE: 162 BPM
STRESS TARGET HR: 138 BPM

## 2021-10-26 PROCEDURE — G0279 TOMOSYNTHESIS, MAMMO: HCPCS

## 2021-10-26 PROCEDURE — 77066 DX MAMMO INCL CAD BI: CPT

## 2021-10-26 PROCEDURE — 70553 MRI BRAIN STEM W/O & W/DYE: CPT

## 2021-10-26 PROCEDURE — 93971 EXTREMITY STUDY: CPT

## 2021-10-26 PROCEDURE — 76642 ULTRASOUND BREAST LIMITED: CPT

## 2021-10-26 PROCEDURE — A9577 INJ MULTIHANCE: HCPCS | Performed by: INTERNAL MEDICINE

## 2021-10-26 PROCEDURE — 0 GADOBENATE DIMEGLUMINE 529 MG/ML SOLUTION: Performed by: INTERNAL MEDICINE

## 2021-10-26 RX ADMIN — GADOBENATE DIMEGLUMINE 17 ML: 529 INJECTION, SOLUTION INTRAVENOUS at 08:34

## 2021-10-26 RX ADMIN — GADOBENATE DIMEGLUMINE 17 ML: 529 INJECTION, SOLUTION INTRAVENOUS at 08:11

## 2021-10-26 NOTE — NURSING NOTE
Patient arrived in xray triage from MRI with extravasation. Family at bedside. Left arm mildly swollen, soft, and having no pain. Elevated arm on 2 pillows and applied warm compress. Dr. Dominguez saw the patient and discussed risks and what to look out for. He said she could leave for her next appointment at [facility]. We went over discharge instructions and directed her to vascular to leave independently.      Protective goggles and mask in place with all patient interactions today

## 2021-10-27 ENCOUNTER — MDT ASSESSMENT (OUTPATIENT)
Dept: OTHER | Facility: HOSPITAL | Age: 56
End: 2021-10-27

## 2021-10-27 ENCOUNTER — APPOINTMENT (OUTPATIENT)
Dept: PET IMAGING | Facility: HOSPITAL | Age: 56
End: 2021-10-27

## 2021-10-27 ENCOUNTER — TELEPHONE (OUTPATIENT)
Dept: INTERVENTIONAL RADIOLOGY/VASCULAR | Facility: HOSPITAL | Age: 56
End: 2021-10-27

## 2021-10-27 ENCOUNTER — HOSPITAL ENCOUNTER (OUTPATIENT)
Dept: PET IMAGING | Facility: HOSPITAL | Age: 56
End: 2021-10-27

## 2021-10-28 ENCOUNTER — HOSPITAL ENCOUNTER (OUTPATIENT)
Dept: PET IMAGING | Facility: HOSPITAL | Age: 56
Discharge: HOME | End: 2021-10-28

## 2021-10-28 DIAGNOSIS — C50.212 MALIGNANT NEOPLASM OF UPPER-INNER QUADRANT OF LEFT BREAST IN FEMALE, ESTROGEN RECEPTOR POSITIVE (HCC): ICD-10-CM

## 2021-10-28 DIAGNOSIS — Z17.0 MALIGNANT NEOPLASM OF UPPER-OUTER QUADRANT OF RIGHT BREAST IN FEMALE, ESTROGEN RECEPTOR POSITIVE (HCC): ICD-10-CM

## 2021-10-28 DIAGNOSIS — C34.12 PRIMARY ADENOCARCINOMA OF UPPER LOBE OF LEFT LUNG (HCC): ICD-10-CM

## 2021-10-28 DIAGNOSIS — C50.411 MALIGNANT NEOPLASM OF UPPER-OUTER QUADRANT OF RIGHT BREAST IN FEMALE, ESTROGEN RECEPTOR POSITIVE (HCC): ICD-10-CM

## 2021-10-28 DIAGNOSIS — Z17.0 MALIGNANT NEOPLASM OF UPPER-INNER QUADRANT OF LEFT BREAST IN FEMALE, ESTROGEN RECEPTOR POSITIVE (HCC): ICD-10-CM

## 2021-10-28 LAB — GLUCOSE BLDC GLUCOMTR-MCNC: 101 MG/DL (ref 70–130)

## 2021-10-28 PROCEDURE — 0 FLUDEOXYGLUCOSE F18 SOLUTION: Performed by: INTERNAL MEDICINE

## 2021-10-28 PROCEDURE — A9552 F18 FDG: HCPCS | Performed by: INTERNAL MEDICINE

## 2021-10-28 PROCEDURE — 82962 GLUCOSE BLOOD TEST: CPT

## 2021-10-28 PROCEDURE — 78815 PET IMAGE W/CT SKULL-THIGH: CPT

## 2021-10-28 RX ADMIN — FLUDEOXYGLUCOSE F18 1 DOSE: 300 INJECTION INTRAVENOUS at 12:02

## 2021-10-29 ENCOUNTER — INFUSION (OUTPATIENT)
Dept: ONCOLOGY | Facility: HOSPITAL | Age: 56
End: 2021-10-29

## 2021-10-29 ENCOUNTER — TELEPHONE (OUTPATIENT)
Dept: ONCOLOGY | Facility: CLINIC | Age: 56
End: 2021-10-29

## 2021-10-29 ENCOUNTER — LAB (OUTPATIENT)
Dept: LAB | Facility: HOSPITAL | Age: 56
End: 2021-10-29

## 2021-10-29 ENCOUNTER — OFFICE VISIT (OUTPATIENT)
Dept: ONCOLOGY | Facility: CLINIC | Age: 56
End: 2021-10-29

## 2021-10-29 VITALS
TEMPERATURE: 97.1 F | RESPIRATION RATE: 16 BRPM | DIASTOLIC BLOOD PRESSURE: 77 MMHG | HEIGHT: 63 IN | HEART RATE: 106 BPM | OXYGEN SATURATION: 95 % | SYSTOLIC BLOOD PRESSURE: 117 MMHG | BODY MASS INDEX: 31.79 KG/M2 | WEIGHT: 179.4 LBS

## 2021-10-29 DIAGNOSIS — Z17.0 MALIGNANT NEOPLASM OF UPPER-INNER QUADRANT OF LEFT BREAST IN FEMALE, ESTROGEN RECEPTOR POSITIVE (HCC): ICD-10-CM

## 2021-10-29 DIAGNOSIS — Z17.0 MALIGNANT NEOPLASM OF UPPER-OUTER QUADRANT OF RIGHT BREAST IN FEMALE, ESTROGEN RECEPTOR POSITIVE (HCC): ICD-10-CM

## 2021-10-29 DIAGNOSIS — C34.12 PRIMARY ADENOCARCINOMA OF UPPER LOBE OF LEFT LUNG (HCC): Primary | ICD-10-CM

## 2021-10-29 DIAGNOSIS — C50.411 MALIGNANT NEOPLASM OF UPPER-OUTER QUADRANT OF RIGHT BREAST IN FEMALE, ESTROGEN RECEPTOR POSITIVE (HCC): ICD-10-CM

## 2021-10-29 DIAGNOSIS — C50.212 MALIGNANT NEOPLASM OF UPPER-INNER QUADRANT OF LEFT BREAST IN FEMALE, ESTROGEN RECEPTOR POSITIVE (HCC): ICD-10-CM

## 2021-10-29 DIAGNOSIS — C34.12 PRIMARY ADENOCARCINOMA OF UPPER LOBE OF LEFT LUNG (HCC): ICD-10-CM

## 2021-10-29 PROCEDURE — 25010000002 CYANOCOBALAMIN PER 1000 MCG: Performed by: INTERNAL MEDICINE

## 2021-10-29 PROCEDURE — G2212 PROLONG OUTPT/OFFICE VIS: HCPCS | Performed by: INTERNAL MEDICINE

## 2021-10-29 PROCEDURE — 96372 THER/PROPH/DIAG INJ SC/IM: CPT

## 2021-10-29 PROCEDURE — 99215 OFFICE O/P EST HI 40 MIN: CPT | Performed by: INTERNAL MEDICINE

## 2021-10-29 RX ORDER — CYANOCOBALAMIN 1000 UG/ML
INJECTION, SOLUTION INTRAMUSCULAR; SUBCUTANEOUS
Status: DISCONTINUED
Start: 2021-10-29 | End: 2021-10-29 | Stop reason: HOSPADM

## 2021-10-29 RX ORDER — HYDROCODONE BITARTRATE AND ACETAMINOPHEN 5; 325 MG/1; MG/1
1 TABLET ORAL EVERY 6 HOURS PRN
Qty: 60 TABLET | Refills: 0 | Status: SHIPPED | OUTPATIENT
Start: 2021-10-29 | End: 2022-02-16

## 2021-10-29 RX ORDER — CYANOCOBALAMIN 1000 UG/ML
1000 INJECTION, SOLUTION INTRAMUSCULAR; SUBCUTANEOUS ONCE
Status: CANCELLED
Start: 2021-10-31

## 2021-10-29 RX ORDER — CYANOCOBALAMIN 1000 UG/ML
1000 INJECTION, SOLUTION INTRAMUSCULAR; SUBCUTANEOUS ONCE
Status: COMPLETED | OUTPATIENT
Start: 2021-10-29 | End: 2021-10-29

## 2021-10-29 RX ORDER — LETROZOLE 2.5 MG/1
2.5 TABLET, FILM COATED ORAL DAILY
Qty: 90 TABLET | Refills: 3 | Status: SHIPPED | OUTPATIENT
Start: 2021-10-29 | End: 2022-11-07 | Stop reason: SDUPTHER

## 2021-10-29 RX ADMIN — CYANOCOBALAMIN 1000 MCG: 1000 INJECTION, SOLUTION INTRAMUSCULAR at 09:36

## 2021-10-29 NOTE — TELEPHONE ENCOUNTER
Provider: CYRIL    Caller: BK    Relationship to Patient: SPOUSE    Phone Number:[phone]    Reason for Call:  BK CALLED STATED DR NAM WAS TO PRESCRIBE PAIN MEDS TO PT. BK STATED PHARMACY HAS YET TO RECEIVE MEDICATION. PLEASE SEND MEDICATION TO PHARMACY AND CALL BK BACK TO [facility]

## 2021-10-29 NOTE — PROGRESS NOTES
Subjective   Holli Patel is a 56 y.o. female.  Referred by Dr. 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    3/1/2021-bilateral screening mammogram-microcalcifications seen in the posterior one third of the lateral aspect of the right breast.  Area of focal asymmetry seen in the middle one third of the upper inner quadrant of the left breast.    3/1/2021-DEXA scan-T score of -2.2 on the lumbar spine and T score of -2.3 in the left hip and T score of -1.9 in the right hip.  Findings consistent with osteopenia    3/23/2021-screening lung CT-there is a 10 x 11 mm solid nodule in the left upper lobe.  Enlarged AP window lymph node measuring 14 x 10 mm.  Suggestion of a possible 9 mm left hilar lymph node.  Heavy coronary artery calcification.    3/23/2021-diagnostic mammogram bilateral-cluster of microcalcifications in the middle third lateral aspect of the right breast.  Left breast demonstrates persistence of the area of focal asymmetry in the region.    Ultrasound-left breast ultrasound at 10 o'clock position, 6 cm from the nipple there is a 0.4 cm irregular hypoechoic lesion.  Stereotactic biopsy of the right breast calcifications recommended.  Ultrasound-guided biopsy of the left breast lesion recommended    4/7/2021-right breast stereotactic biopsy and left breast ultrasound-guided biopsy  Pathology  Right breast-invasive ductal carcinoma, grade 2, lymphovascular invasion present, ER +99% strong, WV +80% moderate, HER-2 negative, Ki-67 12%  Left breast upper inner quadrant 10 o'clock position biopsy, invasive ductal carcinoma, grade   2, ER +99% strong, WV +40% strong, HER-2 2+ on immunohistochemistry, nonamplified on FISH Ki-67 10%    4/14/2021-PET/CT-FDG avid aortopulmonary window lymph node measures 0.9 cm with an SUV of 7.5.  FDG avid left hilar lymph node  measures 1 cm with an SUV of 17.2.  Irregular soft tissue density in the right breast measuring 3.7 x 3.8 cm is favored to be secondary to the recent breast biopsy.  1.1 cm pulmonary nodule within the left upper lobe with SUV 9.7 .  Sub-6 mm pulmonary nodules are present in the right lower lobe.  No evidence of lymphadenopathy or metastatic disease in the abdomen.  Focal area of FDG uptake within the central canal posterior to T11-T12 displaced demonstrating an SUV of 5.5 thought to be reactive however MRI is recommended for further evaluation.    She was seen by Dr. Molina who initially planned for breast surgery however  due to the PET abnormalities she has been referred to Dr. Kerr who plans to do a wound on 4/30/2021.  Dr. Molina's and Dr. Kerr's notes reviewed.    Patient denies any family history of breast cancer.  Her mother had lymphoma.  Maternal grandmother had colon cancer.  Prior to that screening mammogram she did not have any palpable abnormalities of either breast.    She has been a heavy smoker for about 40 years and smoked 2 packs/day.  Denies any recent weight changes, new bone pains, cough, abdominal pain nausea vomiting constipation or diarrhea.  She does have anxiety and has been on several medications.  She has recently been started on Xanax to help with the mood and also with insomnia.    Patient had a video-assisted thoracoscopy and mediastinal lymphadenectomy on 4/30/2021.  L5-L6 lymph nodes were biopsied however the small pulmonary nodule in the left upper lobe was not difficult to find.  Pathology showed moderately differentiated adenocarcinoma which is CK7 and TTF-1 positive.  Consistent with lung primary.  Ki-67 85%.    5/14/2021-MRI of the brain-minimal chronic small vessel ischemic change in the white matter.  Otherwise negative MRI.    5/20/2021-bilateral axillary ultrasound-no evidence of axillary lymphadenopathy in either axilla.  Normal-appearing bilateral axillary lymph  nodes visualized.    Cycle 1 cisplatin and Alimta on 5/25/2021.    Cycle 2 cisplatin Alimta 6/15/2021    Cycle 3 cisplatin Alimta 7/7/2021    Completed radiation on 7/12/2021    Port was nonfunctional hence a port study was performed which showed that the port was backed up into the innominate vein and also there was a nonocclusive thrombus at the end of the catheter extending into the superior vena cava.  She was started on anticoagulation with Eliquis.  It was recommended for port revision of the intention is to keep the port.    PET/CT 8/5/2021-images independently reviewed and interpreted by me-decrease in size and FDG uptake of the left upper lobe pulmonary nodule as well as mediastinal and left hilar lymphadenopathy representing response to treatment.  Sub-6 mm pulmonary nodule in the left upper lobe new since 4/14/2021.  This could be related to radiation however follow-up CT in 3 months recommended.  Decrease in size of the irregular masslike tissue in the right breast which is postbiopsy hematoma.  This is not PET avid.  New segmental left eighth rib fractures healing.  A new band of sclerosis of the left seventh rib which favored to represent healing nondisplaced fracture.  Follow-up CT recommended.  focal uptake in the duodenum first and second portions.  Could be related to duodenitis.  Indeterminate lesion in the L1 vertebral body not well evaluated on PET/CT.    10/1/2021-MRI of the lumbar spine.  Lesion in the left posterior body of L1 measures 14 x 14 x 12 mm and previously 5 x 5 x 6 mm.  The interval enlargement strongly suggest that it is metastatic lesion.  No additional osseous metastasis noted.   Other chronic changes noted.    10/14/2021-biopsy of the lumbar spine lesion-pathology consistent with poorly differentiated carcinoma.  The staining is similar to the prior tumors and fevers this being metastatic poorly differentiated pulmonary adenocarcinoma.    10/26/2021-brain MRI-no evidence of  metastatic disease.    10/26/2021-bilateral diagnostic mammogram and ultrasound  Scattered fibroglandular density in both breast.  Postbiopsy hematoma with internal biopsy clip in the upper outer quadrant of the right breast, 8 cm from the nipple.  The hematoma size is decreased to 2.9 cm from 3.6 cm earlier.    Left breast-parenchymal density measuring 9 x 10 mm has markedly decreased.  Few adjacent calcifications which are unchanged.  No new abnormality in the left breast.  At 10:00 region there is some minimal adjacent hypoechoic texture which is difficult to measure but appears smaller since the previous exam.    10/28/2021-PET/CT  New L1 and L2 lytic bone metastasis annually intensely hypermetabolic focus at the left adrenal gland likely represents metastatic disease as well.  Slight decrease in size of the 0.9 x 0.7 cm left upper lobe pulmonary nodule.  There is hypermetabolic activity related to radiation pneumonitis.  The remainder of the nodule is photopenic.  Uncertain etiology of the more intense activity along the right lateral peripheral margin of the 2.6 cm postbiopsy density of the right breast.    Interval history  Ms. Patel presents to the clinic today for follow-up.  She is accompanied by her .  She has been experiencing some back pain which has progressively gotten worse over the past month.  No other complaints.  She remains anxious over the diagnosis and treatments.  She is here to review the results of the PET/CT, MRI of the brain, diagnostic mammogram and ultrasound as well as the Doppler.      The following portions of the patient's history were reviewed and updated as appropriate: allergies, current medications, past family history, past medical history, past social history, past surgical history and problem list.    Past Medical History:   Diagnosis Date   • Anxiety    • Dyspnea on exertion    • SHAYY (generalized anxiety disorder)     RELATED HIGH BLOOD PRESSURE   • GERD  (gastroesophageal reflux disease)    • High blood pressure     ANXIETY RELATED   • Hyperlipidemia    • Hypothyroidism    • Insomnia    • Lung cancer (HCC)    • Lung nodule    • Malignant neoplasm of upper-outer quadrant of right breast in female, estrogen receptor positive (HCC) 2021   • Migraine    • PONV (postoperative nausea and vomiting)         Past Surgical History:   Procedure Laterality Date   • BREAST BIOPSY Bilateral 2021    Right Breast 10 o'clock & Left breast 10 o'clock 6 cm from nipple, [facility]   • CLOSED REDUCTION HIP DISLOCATION Left 2021    Procedure: BRONCHOSCOPY, LEFT VIDEO ASSITED THORACOSCOPY, ROBOTIC ASSSITED MEDIASTINAL LYMPHADENECTOMY WITH INTERCOSTAL NERVE BLOCKS;  Surgeon: Raphael Kerr III, MD;  Location: [facility] MAIN OR;  Service: DaVinci;  Laterality: Left;   • COLONOSCOPY     • VENOUS ACCESS DEVICE (PORT) INSERTION Right 2021    Procedure: INSERTION VENOUS ACCESS DEVICE;  Surgeon: Raphael Kerr III, MD;  Location: [facility] MAIN OR;  Service: Thoracic;  Laterality: Right;   • WRIST SURGERY Right         Family History   Problem Relation Age of Onset   • Cancer Father         LYMPHATIC   • Alcohol abuse Brother    • Colon cancer Maternal Grandmother    • Malig Hyperthermia Neg Hx         Social History     Socioeconomic History   • Marital status:    Tobacco Use   • Smoking status: Former Smoker     Packs/day: 1.00     Types: Electronic Cigarette, Cigarettes     Start date: 1981   • Smokeless tobacco: Never Used   • Tobacco comment: ABT 15 CIGARETTES DAILY   Vaping Use   • Vaping Use: Some days   • Substances: Nicotine, Flavoring   • Devices: Disposable   Substance and Sexual Activity   • Alcohol use: Never   • Drug use: Never   • Sexual activity: Yes     Partners: Male        OB History        2    Para   2    Term   2            AB        Living           SAB        IAB        Ectopic        Molar        Multiple        Live Births         "        Age of menarche-10  Age at menopause-42  Oral contraceptive pill use-15 years  No HRT use  She has 2 children  Age at first live childbirth-17    No Known Allergies     Review of Systems   Review of systems as mentioned in the HPI    Objective   Blood pressure 117/77, pulse 106, temperature 97.1 °F (36.2 °C), temperature source Temporal, resp. rate 16, height 160 cm (62.99\"), weight 81.4 kg (179 lb 6.4 oz), SpO2 95 %, not currently breastfeeding.       Physical Exam  Vitals reviewed.   HENT:      Head: Normocephalic.   Eyes:      Extraocular Movements: Extraocular movements intact.      Conjunctiva/sclera: Conjunctivae normal.      Pupils: Pupils are equal, round, and reactive to light.   Cardiovascular:      Rate and Rhythm: Normal rate and regular rhythm.      Pulses: Normal pulses.      Heart sounds: Normal heart sounds.   Pulmonary:      Effort: Pulmonary effort is normal.      Breath sounds: Normal breath sounds.   Abdominal:      General: Abdomen is flat. Bowel sounds are normal. There is no distension.      Palpations: There is no mass.   Musculoskeletal:         General: No swelling. Normal range of motion.      Cervical back: Normal range of motion.   Skin:     General: Skin is warm.   Neurological:      General: No focal deficit present.      Mental Status: She is alert and oriented to person, place, and time.   Psychiatric:         Mood and Affect: Mood normal.         Behavior: Behavior normal.     Breast Exam: Right breast with a large postbiopsy hematoma in the upper outer quadrant measuring 4 cm.    No other palpable abnormalities.  Left breast with no palpable abnormalities.  No right or left axillary lymphadenopathy(breast not examined today)    I I have reexamined the patient and the results are consistent with the previously documented exam. Salma Snell MD     Hospital Outpatient Visit on 10/28/2021   Component Date Value Ref Range Status   • Glucose 10/28/2021 101  70 - 130 mg/dL " Final    Meter: [id number] : [id number] [facility]   Hospital Outpatient Visit on 10/26/2021   Component Date Value Ref Range Status   • Target HR (85%) 10/26/2021 138  bpm Final   • Max. Pred. HR (100%) 10/26/2021 162  bpm Final   • Right Internal Jugular Augment 10/26/2021 Y   Final   • Right Internal Jugular Competent 10/26/2021 Y   Final   • Right Internal Jugular Compress 10/26/2021 C   Final   • Right Internal Jugular Phasic 10/26/2021 Y   Final   • Right Internal Jugular Spont 10/26/2021 Y   Final   • Left Internal Jugular Augment 10/26/2021 Y   Final   • Left Internal Jugular Competent 10/26/2021 Y   Final   • Left Internal Jugular Compress 10/26/2021 C   Final   • Left Internal Jugular Phasic 10/26/2021 Y   Final   • Left Internal Jugular Spont 10/26/2021 Y   Final   • Right Subclavian Augment 10/26/2021 Y   Final   • Right Subclavian Competent 10/26/2021 Y   Final   • Right Subclavian Phasic 10/26/2021 Y   Final   • Right Subclavian Spont 10/26/2021 Y   Final   • Left Subclavian Augment 10/26/2021 Y   Final   • Left Subclavian Competent 10/26/2021 Y   Final   • Left Subclavian Phasic 10/26/2021 Y   Final   • Left Subclavian Spont 10/26/2021 Y   Final   • Right Axillary Augment 10/26/2021 Y   Final   • Right Axillary Competent 10/26/2021 Y   Final   • Right Axillary Compress 10/26/2021 C   Final   • Right Axillary Phasic 10/26/2021 Y   Final   • Right Axillary Spont 10/26/2021 Y   Final   • Right Brachial Compress 10/26/2021 C   Final   • Right Radial Compress 10/26/2021 C   Final   • Right Ulnar Compress 10/26/2021 C   Final   • Right Basilic Upper Compress 10/26/2021 C   Final   • Right Basilic Forearm Compress 10/26/2021 C   Final   • Right Cephalic Upper Compress 10/26/2021 C   Final   • Right Cephalic Forearm Compress 10/26/2021 C   Final   • Left Subclavian Compress 10/26/2021 C   Final   Lab on 10/22/2021   Component Date Value Ref Range Status   • Glucose 10/22/2021 103  74 - 124 mg/dL  Final   • BUN 10/22/2021 14  6 - 20 mg/dL Final   • Creatinine 10/22/2021 1.00  0.60 - 1.10 mg/dL Final   • Sodium 10/22/2021 137  134 - 145 mmol/L Final   • Potassium 10/22/2021 5.0* 3.5 - 4.7 mmol/L Final   • Chloride 10/22/2021 99  98 - 107 mmol/L Final   • CO2 10/22/2021 27.0  22.0 - 29.0 mmol/L Final   • Calcium 10/22/2021 9.9  8.5 - 10.2 mg/dL Final   • Total Protein 10/22/2021 7.9  6.3 - 8.0 g/dL Final   • Albumin 10/22/2021 4.20  3.50 - 5.20 g/dL Final   • ALT (SGPT) 10/22/2021 13  0 - 33 U/L Final   • AST (SGOT) 10/22/2021 14  0 - 32 U/L Final   • Alkaline Phosphatase 10/22/2021 110  38 - 116 U/L Final   • Total Bilirubin 10/22/2021 <0.2* 0.2 - 1.2 mg/dL Final   • eGFR Non African Amer 10/22/2021 57* >60 mL/min/1.73 Final   • Globulin 10/22/2021 3.7* 1.8 - 3.5 gm/dL Final   • A/G Ratio 10/22/2021 1.1  1.1 - 2.4 g/dL Final   • BUN/Creatinine Ratio 10/22/2021 14.0  7.3 - 30.0 Final   • Anion Gap 10/22/2021 11.0  5.0 - 15.0 mmol/L Final   • WBC 10/22/2021 11.86* 3.40 - 10.80 10*3/mm3 Final   • RBC 10/22/2021 3.61* 3.77 - 5.28 10*6/mm3 Final   • Hemoglobin 10/22/2021 10.2* 12.0 - 15.9 g/dL Final   • Hematocrit 10/22/2021 33.7* 34.0 - 46.6 % Final   • MCV 10/22/2021 93.4  79.0 - 97.0 fL Final   • MCH 10/22/2021 28.3  26.6 - 33.0 pg Final   • MCHC 10/22/2021 30.3* 31.5 - 35.7 g/dL Final   • RDW 10/22/2021 13.1  12.3 - 15.4 % Final   • RDW-SD 10/22/2021 44.9  37.0 - 54.0 fl Final   • MPV 10/22/2021 8.4  6.0 - 12.0 fL Final   • Platelets 10/22/2021 409  140 - 450 10*3/mm3 Final   • Neutrophil % 10/22/2021 77.0* 42.7 - 76.0 % Final   • Lymphocyte % 10/22/2021 8.9* 19.6 - 45.3 % Final   • Monocyte % 10/22/2021 9.4  5.0 - 12.0 % Final   • Eosinophil % 10/22/2021 2.7  0.3 - 6.2 % Final   • Basophil % 10/22/2021 0.7  0.0 - 1.5 % Final   • Immature Grans % 10/22/2021 1.3* 0.0 - 0.5 % Final   • Neutrophils, Absolute 10/22/2021 9.14* 1.70 - 7.00 10*3/mm3 Final   • Lymphocytes, Absolute 10/22/2021 1.05  0.70 - 3.10 10*3/mm3  Final   • Monocytes, Absolute 10/22/2021 1.12* 0.10 - 0.90 10*3/mm3 Final   • Eosinophils, Absolute 10/22/2021 0.32  0.00 - 0.40 10*3/mm3 Final   • Basophils, Absolute 10/22/2021 0.08  0.00 - 0.20 10*3/mm3 Final   • Immature Grans, Absolute 10/22/2021 0.15* 0.00 - 0.05 10*3/mm3 Final   • nRBC 10/22/2021 0.0  0.0 - 0.2 /100 WBC Final   Hospital Outpatient Visit on 10/14/2021   Component Date Value Ref Range Status   • Platelets 10/14/2021 371  140 - 450 10*3/mm3 Final   • Protime 10/14/2021 13.1  12.8 - 15.2 seconds Final    Serial Number: [id number]:  [id number]   • INR 10/14/2021 1.1  0.8 - 1.2 Final   • Addendum 10/14/2021    Final                    Value:This result contains rich text formatting which cannot be displayed here.   • Case Report 10/14/2021    Final                    Value:Surgical Pathology Report                         Case: [id number]                                  Authorizing Provider:  Salma Snell MD       Collected:           10/14/2021 11:40 AM          Ordering Location:     [facility]  Received:            10/14/2021 11:59 AM                                 CT                                                                           Pathologist:           Raphael Neri MD                                                         Specimen:    Spine, Lumbar, ct guided bone lesion L1                                                   • Clinical Information 10/14/2021    Final                    Value:This result contains rich text formatting which cannot be displayed here.   • Final Diagnosis 10/14/2021    Final                    Value:This result contains rich text formatting which cannot be displayed here.   • Comment 10/14/2021    Final                    Value:This result contains rich text formatting which cannot be displayed here.   • Gross Description 10/14/2021    Final                    Value:This result contains rich text formatting which cannot be  displayed here.   • Special Stains 10/14/2021    Final                    Value:This result contains rich text formatting which cannot be displayed here.   • WBC 10/14/2021 13.10* 3.40 - 10.80 10*3/mm3 Final   • RBC 10/14/2021 3.66* 3.77 - 5.28 10*6/mm3 Final   • Hemoglobin 10/14/2021 10.4* 12.0 - 15.9 g/dL Final   • Hematocrit 10/14/2021 32.6* 34.0 - 46.6 % Final   • MCV 10/14/2021 89.1  79.0 - 97.0 fL Final   • MCH 10/14/2021 28.4  26.6 - 33.0 pg Final   • MCHC 10/14/2021 31.9  31.5 - 35.7 g/dL Final   • RDW 10/14/2021 12.6  12.3 - 15.4 % Final   • RDW-SD 10/14/2021 41.1  37.0 - 54.0 fl Final   • MPV 10/14/2021 9.5  6.0 - 12.0 fL Final   • Platelets 10/14/2021 391  140 - 450 10*3/mm3 Final   • Neutrophil % 10/14/2021 79.9* 42.7 - 76.0 % Final   • Lymphocyte % 10/14/2021 7.4* 19.6 - 45.3 % Final   • Monocyte % 10/14/2021 9.9  5.0 - 12.0 % Final   • Eosinophil % 10/14/2021 1.8  0.3 - 6.2 % Final   • Basophil % 10/14/2021 0.5  0.0 - 1.5 % Final   • Immature Grans % 10/14/2021 0.5  0.0 - 0.5 % Final   • Neutrophils, Absolute 10/14/2021 10.47* 1.70 - 7.00 10*3/mm3 Final   • Lymphocytes, Absolute 10/14/2021 0.97  0.70 - 3.10 10*3/mm3 Final   • Monocytes, Absolute 10/14/2021 1.30* 0.10 - 0.90 10*3/mm3 Final   • Eosinophils, Absolute 10/14/2021 0.23  0.00 - 0.40 10*3/mm3 Final   • Basophils, Absolute 10/14/2021 0.07  0.00 - 0.20 10*3/mm3 Final   • Immature Grans, Absolute 10/14/2021 0.06* 0.00 - 0.05 10*3/mm3 Final   • nRBC 10/14/2021 0.0  0.0 - 0.2 /100 WBC Final   Lab on 10/06/2021   Component Date Value Ref Range Status   • Glucose 10/06/2021 98  74 - 124 mg/dL Final   • BUN 10/06/2021 14  6 - 20 mg/dL Final   • Creatinine 10/06/2021 0.98  0.60 - 1.10 mg/dL Final   • Sodium 10/06/2021 137  134 - 145 mmol/L Final   • Potassium 10/06/2021 4.7  3.5 - 4.7 mmol/L Final   • Chloride 10/06/2021 99  98 - 107 mmol/L Final   • CO2 10/06/2021 27.9  22.0 - 29.0 mmol/L Final   • Calcium 10/06/2021 9.7  8.5 - 10.2 mg/dL Final   •  Total Protein 10/06/2021 7.5  6.3 - 8.0 g/dL Final   • Albumin 10/06/2021 4.30  3.50 - 5.20 g/dL Final   • ALT (SGPT) 10/06/2021 10  0 - 33 U/L Final   • AST (SGOT) 10/06/2021 15  0 - 32 U/L Final   • Alkaline Phosphatase 10/06/2021 107  38 - 116 U/L Final   • Total Bilirubin 10/06/2021 <0.2* 0.2 - 1.2 mg/dL Final   • eGFR Non African Amer 10/06/2021 58* >60 mL/min/1.73 Final   • Globulin 10/06/2021 3.2  1.8 - 3.5 gm/dL Final   • A/G Ratio 10/06/2021 1.3  1.1 - 2.4 g/dL Final   • BUN/Creatinine Ratio 10/06/2021 14.3  7.3 - 30.0 Final   • Anion Gap 10/06/2021 10.1  5.0 - 15.0 mmol/L Final   • WBC 10/06/2021 9.54  3.40 - 10.80 10*3/mm3 Final   • RBC 10/06/2021 3.83  3.77 - 5.28 10*6/mm3 Final   • Hemoglobin 10/06/2021 11.0* 12.0 - 15.9 g/dL Final   • Hematocrit 10/06/2021 35.8  34.0 - 46.6 % Final   • MCV 10/06/2021 93.5  79.0 - 97.0 fL Final   • MCH 10/06/2021 28.7  26.6 - 33.0 pg Final   • MCHC 10/06/2021 30.7* 31.5 - 35.7 g/dL Final   • RDW 10/06/2021 13.2  12.3 - 15.4 % Final   • RDW-SD 10/06/2021 45.0  37.0 - 54.0 fl Final   • MPV 10/06/2021 8.6  6.0 - 12.0 fL Final   • Platelets 10/06/2021 289  140 - 450 10*3/mm3 Final   • Neutrophil % 10/06/2021 72.4  42.7 - 76.0 % Final   • Lymphocyte % 10/06/2021 11.5* 19.6 - 45.3 % Final   • Monocyte % 10/06/2021 12.2* 5.0 - 12.0 % Final   • Eosinophil % 10/06/2021 2.5  0.3 - 6.2 % Final   • Basophil % 10/06/2021 0.6  0.0 - 1.5 % Final   • Immature Grans % 10/06/2021 0.8* 0.0 - 0.5 % Final   • Neutrophils, Absolute 10/06/2021 6.90  1.70 - 7.00 10*3/mm3 Final   • Lymphocytes, Absolute 10/06/2021 1.10  0.70 - 3.10 10*3/mm3 Final   • Monocytes, Absolute 10/06/2021 1.16* 0.10 - 0.90 10*3/mm3 Final   • Eosinophils, Absolute 10/06/2021 0.24  0.00 - 0.40 10*3/mm3 Final   • Basophils, Absolute 10/06/2021 0.06  0.00 - 0.20 10*3/mm3 Final   • Immature Grans, Absolute 10/06/2021 0.08* 0.00 - 0.05 10*3/mm3 Final   • nRBC 10/06/2021 0.0  0.0 - 0.2 /100 WBC Final   Hospital Outpatient  Visit on 10/01/2021   Component Date Value Ref Range Status   • Creatinine 10/01/2021 2.30* 0.60 - 1.30 mg/dL Final    Serial Number: [id number]:  [id number]   • Creatinine 10/01/2021 2.10* 0.60 - 1.30 mg/dL Final    Serial Number: [id number]:  [id number]        MRI Brain With & Without Contrast    Result Date: 10/26/2021  Evidence of minimal small vessel chronic ischemic change as described. Tiny late acute to subacute phase is widely infarct in the right posterior frontal region as described. Otherwise unremarkable MRI of the brain. There is no definite evidence of intracranial metastatic disease.  This report was finalized on 10/26/2021 11:21 AM by Dr. Lucio Jimenez M.D.      MRI Lumbar Spine Without Contrast    Result Date: 10/5/2021  1. In the last 4 months since prior lumbar spine MRI 05/27/2021, there has been interval enlargement of the rounded T1 hypointense T2 hyperintense lesion in the left posterior body of L1, on 05/27/2020 it measured 6 x 5 x 6 mm, and on the current exam it measures 14 x 14 x 12 mm, and its interval enlargement strongly suggests that it is a metastatic lesion. No additional osseous metastases are seen in the lumbar spine. 2. The remainder of the lumbar spine MRI is unchanged. There is mild lumbar lower lumbar spondylosis. At L4-L5, there is mild right and mild-to-moderate left facet overgrowth and posterior central disc bulge or small disc protrusion that only minimally narrows the thecal sac and there is mild bilateral foraminal narrowing at L4-L5. At L5-S1, there is moderate bilateral facet overgrowth and a 4-5 mm degenerative anterolisthesis of L5 with respect to S1. There is some bilateral lateral recess narrowing could affect the traversing S1 nerve roots. There is loss of vertical height of the foramina and bulging disc material into the inferior aspect of the foramina, and synovial thickening off the facets extending into the posterior foramina, and there is mild-to-moderate  bilateral foraminal narrowing at L5-S1.  This report was finalized on 10/5/2021 3:34 PM by Dr. Raphael Kingston M.D.       CBC 8/25/2021-WBC 7.76, hemoglobin 10.8, platelets 255    8/24/2021 bilateral diagnostic mammogram and bilateral ultrasound-images independently reviewed and interpreted by me  In the left breast there is a 1 cm focal asymmetry.  Previously measured 0.8 cm on 3/1/2021.  1.4 cm group of calcifications adjacent to the superior lateral margin of the biopsy clip which are new.    In the right breast there is a 3.5 cm postbiopsy hematoma.  This has decreased in size.  Loosely grouped residual calcifications measuring up to 2.5 cm.  These have decreased in number compared to 3/23/2021.  However the extent measures slightly larger could be due to superimposed hematoma.    Ultrasound  At 10 o'clock position of the left breast, 6 cm from the nipple there is an echogenic focus from the biopsy clip and the malignancy measures 0.4 x 0.3 cm.    10/6/2021-WBC 9.54, hemoglobin 11, platelets 289,000  CMP-BUN 14, creatinine 0.98, LFTs normal    10/1/2021-MRI of the lumbar spine 10/1/2021-images independently reviewed and interpreted by me.  There has been an interval enlargement in the rounded T1 hypointense, T2 hyperintense lesion in the left posterior body of L1.  Previously measured 6 x 5 x 6 mm and now measures 14 x 14 x 12 mm.  This is highly concerning for osseous metastasis.  No other evidence of bony metastasis noted.  Other chronic changes.      10/22/2020  CBC-WBC 11.86, hemoglobin 10.2, platelets 409,000  CMP-BUN 14, creatinine 1, potassium 5     Bilateral diagnostic mammogram, ultrasound, PET/CT, MRI of the brain images independently reviewed interpreted by me in detail summarized in HPI      Assessment/Plan      *Bilateral breast cancer  · Invasive ductal carcinoma in both right and left breast and lesions measure under 1 cm.  No palpable lymphadenopathy although unsure if this has been evaluated by an  axillary ultrasound.  No abnormal axillary lymphadenopathy noted on PET.  · Most likely clinical T1b N0 M0, both cancers were noted to be grade 2, ER/IN positive and HER-2 negative and Ki-67 less than 15%  VATS on 4/30/2021  Biopsy confirms adenocarcinoma of pulmonary primary  Discussed with Dr. Molina about delaying breast surgery and she is in agreement  Darted on neoadjuvant anastrozole  · Patient experienced significant adverse effects with anastrozole  · Treatment changed to letrozole   · Axillary ultrasound 4/20/2021 -negative for any axillary lymphadenopathy  · Started letrozole in May 2021  · She continues on letrozole without any problems.  · 8/24/2021 bilateral diagnostic mammogram and ultrasound.  In the left breast the tumor now measures 1 cm on mammogram as opposed to 0.8 cm in March.  Right breast calcifications also noted to be in a large extent although the number seems to be decreased.  Hematoma has decreased in size.  · 10/26/2021-bilateral diagnostic mammogram and ultrasound-decrease in size of the bilateral breast lesions.  · 10/26/2021-PET/CT-metastatic lesions in the left adrenal gland, L1-L2.  · 10/26/2021-MRI of the brain-no evidence of metastatic disease  · Since the breast lesions are decreasing in size, we will continue with letrozole.  · We previously discussed changing treatment to exemestane however since the most recent mammogram and ultrasound shows decrease in the breast lesions I think it is reasonable to proceed with letrozole.  · We will hold off on lumpectomy at this time to see what the course of lung cancer would be.      *Adenocarcinoma of the left lung  · Most likely stage T1 N2 M0, stage III  · MRI of the thoracic spine ordered to further evaluate the abnormality seen on PET  · MRI of the brain negative   · Given that she is only 55 and fairly good performance status I discussed with her by with concurrent chemoradiation with cisplatin and Alimta every 3 weeks followed by  durvalumab.  · Adverse effects including but not limited to myelosuppression, increased risk of infections, nausea, vomiting, renal dysfunction, ototoxicity, neurotoxicity have been discussed at length.  · MRI of the thoracic spine performed 5/24/2021 shows no evidence of metastatic disease in the thoracic spine however in L1 there is a 7 mm lesion which is indeterminate.  A lumbar spine MRI has been recommended.  · Discussed her case with Dr. Oates and he feels like MRI of the lumbar spine may also show that this lesion is indeterminate.  · Discussed the same with the patient and her .  · Even if this is metastatic disease this might be the only lesion of metastatic disease and then would consider this oligometastatic disease.  · Therefore plan to proceed with concurrent chemoradiation as scheduled.  · cisplatin and Alimta, C1D1 5/25/2021  · Day 1 of radiation 5/25/2021  · 5/27/2021-MRI of the lumbar spine-there is a 6 x 5 x 6 mm enhancing lesion in the left posterior body of L1 vertebra.  This is considered indeterminate.  Follow-up recommended.  Degenerative changes noted at L5-S1  · 7/7/2021, cycle 3 cisplatin Alimta  · 7/12/2021-radiation completed  · PET/CT 8/6/2021 with good response to chemoradiation.  · MRI of the lumbar spine 10/1/2021 with increase in size of the L1 lesion which now measures 14 mm as opposed to 6 mm in May 2021.  · 10/14/2021-biopsy of the L1 lesion and pathology consistent with adenocarcinoma of the lung, TPS 0  · 0/26/2021-PET/CT-metastatic lesions in the left adrenal gland, L1-L2.  · 10/26/2021-MRI of the brain-no evidence of metastatic disease  · Given that she only has metastatic disease in L1-L2 and left adrenal gland I think it would be possible to radiate all these regions.  · Systemic chemotherapy will be changed to Keytruda and Alimta.  · She is scheduled to see radiation oncology on 11/1/2021.    *Bilateral breast cancer-no family history of breast cancer but given  synchronous breast cancer genetic testing has been sent.  Genetic testing with a variant of unknown significance.    *Right hand numbness and difficulty with grasping objects  · Previously had history of carpal tunnel requiring repair.  · She is currently on letrozole which could cause worsening of the carpal tunnel  · Gabapentin dose will be increased to 600 mg 3 times daily  · She will also be referred to hand surgery for further evaluation.  · She now reports bilateral upper extremity numbness.  · Continue gabapentin  · MRI of the brain 10/26/2021 without any evidence of metastatic disease    *Anxiety-  · Currently on Xanax 1 mg nightly  · She required Valium for the MRI as she had claustrophobia and a panic attack  · Remains anxious secondary to recent progression of metastatic lung cancer  · Continue close follow-up with supportive oncology    *Left-sided chest wall pain-secondary to VATS.  Most likely neuropathic.  Continue gabapentin and Norco as needed.  She ran out of gabapentin and experienced leg cramps.    Continue gabapentin      *Hypertension-continue lisinopril.  Blood pressure 117/77    *Smoking-not addressed today      *GERD  · 7/7/2021, patient complains of reflux despite the use of Prilosec 20 mg twice daily.  We will send her a prescription for Carafate slurry 4 times daily.  · She has not quite started using the Carafate.  · Continue Prilosec and recommend using Carafate  · PET/CT shows duodenitis      *Port not returning blood  · Patient reports the last treatment that difficulty getting blood return.  Today nursing was unable to get blood return and have placed Activase without success.  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  The end of the catheter has backed up into the innominate vein.  This was discussed with Dr. Snell and  there is no need for additional imaging at this time.  WE WILL NOT USE THE PORT.   · She has been started on Eliquis.  Completed a week of 10 mg twice daily and now on 5 mg.  Continue the same  · We will obtain imaging to see if the disease is responded.  If patient wishes to get the port out then we will continue with anticoagulation for 3 months and remove the port.  However if she wishes to keep the port then a port revision will have to be performed.    · Continue Eliquis and remove port after 3 months  · Continue Eliquis at this time  · Innominate vein could not be assessed on the Doppler.  · We would need a new port since she has now been diagnosed with metastatic lung cancer and will need to be on chemotherapy indefinitely.  · She has follow-up scheduled with Dr. Matias    PLAN:  1. Continue Eliquis for now and discontinue after port exchange  2. Continue letrozole 2.5 mg daily  3. Continue Carafate 4 times daily.   4. Continue PPI  5. Radiation to the L1-L2 and adrenal metastasis  6. Start palliative chemotherapy with Keytruda and Alimta every 3 weeks  7. B12 injection today    70 minutes spent on the encounter including face-to-face time, reviewing the medical records and documentation of the same day, as well as discussion in the multidisciplinary conference.    Salma Snell MD

## 2021-10-29 NOTE — TELEPHONE ENCOUNTER
Returned Jelani's call, advised him the norco was pended in her encounter and had not been signed, however at the time of our phone conversation it was signed.  He v/u.

## 2021-11-01 ENCOUNTER — APPOINTMENT (OUTPATIENT)
Dept: RADIATION ONCOLOGY | Facility: HOSPITAL | Age: 56
End: 2021-11-01

## 2021-11-01 ENCOUNTER — TELEPHONE (OUTPATIENT)
Dept: ONCOLOGY | Facility: CLINIC | Age: 56
End: 2021-11-01

## 2021-11-01 ENCOUNTER — CONSULT (OUTPATIENT)
Dept: RADIATION ONCOLOGY | Facility: HOSPITAL | Age: 56
End: 2021-11-01

## 2021-11-01 VITALS — DIASTOLIC BLOOD PRESSURE: 75 MMHG | HEART RATE: 70 BPM | OXYGEN SATURATION: 93 % | SYSTOLIC BLOOD PRESSURE: 107 MMHG

## 2021-11-01 DIAGNOSIS — C79.51 NON-SMALL CELL LUNG CANCER METASTATIC TO BONE (HCC): ICD-10-CM

## 2021-11-01 DIAGNOSIS — C34.90 NON-SMALL CELL LUNG CANCER METASTATIC TO ADRENAL GLAND (HCC): ICD-10-CM

## 2021-11-01 DIAGNOSIS — C34.90 NON-SMALL CELL LUNG CANCER METASTATIC TO BONE (HCC): ICD-10-CM

## 2021-11-01 DIAGNOSIS — C34.12 PRIMARY ADENOCARCINOMA OF UPPER LOBE OF LEFT LUNG (HCC): Primary | ICD-10-CM

## 2021-11-01 DIAGNOSIS — C79.70 NON-SMALL CELL LUNG CANCER METASTATIC TO ADRENAL GLAND (HCC): ICD-10-CM

## 2021-11-01 PROCEDURE — 77334 RADIATION TREATMENT AID(S): CPT | Performed by: RADIOLOGY

## 2021-11-01 PROCEDURE — 77399 UNLISTED PX MED RADJ PHYSICS: CPT | Performed by: RADIOLOGY

## 2021-11-01 PROCEDURE — 77263 THER RADIOLOGY TX PLNG CPLX: CPT | Performed by: RADIOLOGY

## 2021-11-01 PROCEDURE — 99243 OFF/OP CNSLTJ NEW/EST LOW 30: CPT | Performed by: RADIOLOGY

## 2021-11-01 RX ORDER — BUPROPION HYDROCHLORIDE 150 MG/1
TABLET ORAL
COMMUNITY
Start: 2021-10-19 | End: 2021-11-01

## 2021-11-01 RX ORDER — OXYCODONE HYDROCHLORIDE AND ACETAMINOPHEN 5; 325 MG/1; MG/1
1 TABLET ORAL EVERY 6 HOURS PRN
Qty: 50 TABLET | Refills: 0 | Status: SHIPPED | OUTPATIENT
Start: 2021-11-01 | End: 2021-11-01

## 2021-11-01 RX ORDER — OXYCODONE HYDROCHLORIDE AND ACETAMINOPHEN 5; 325 MG/1; MG/1
1 TABLET ORAL EVERY 6 HOURS PRN
Qty: 50 TABLET | Refills: 0 | Status: SHIPPED | OUTPATIENT
Start: 2021-11-01 | End: 2021-12-01 | Stop reason: SDUPTHER

## 2021-11-01 NOTE — PROGRESS NOTES
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 year old female who is well-known to me from previous treatment of her left lung primary when we delivered 6600 cGy 33 treatments concurrent with chemotherapy from May 25 through July 12, 2021.  She did at that point also have a diagnosis of bilateral breast cancers which we anticipated treating thereafter.    In short, she was found to have an abnormality on routine screening mammogram dated March 1, 2021.  This revealed microcalcifications in the lateral aspect of the right breast and an area of focal asymmetry in the upper inner quadrant of the left breast.  Diagnostic mammogram and ultrasound on March 23, 2021 confirmed the cluster of microcalcifications in the lateral aspect of the right breast and a 4 mm lesion at the 10 o'clock position of the left breast and biopsy of both was recommended. She concurrently underwent a low-dose screening chest CT given her tobacco history on March 23, 2021 which showed a 10 x 11 mm solid nodule in the left upper lobe as well as a 2 mm right upper lobe subpleural nodule.    Returning to the breast she underwent stereotactic guided biopsy of the calcifications and ultrasound-guided biopsy of the abnormality in the left breast on April 7, 2021 and that pathology revealed an invasive ductal carcinoma, grade 2 measuring 2 mm from the right breast.  There was no perineural nor lymphovascular invasion noted.  There was associated DCIS of low to intermediate grade with no necrosis.  That tissue was  found to be strongly positive for both estrogen and progesterone receptors.  Biopsy from the left breast also revealed invasive ductal carcinoma measuring 4.5 mm, grade 2, again without perineural or lymphovascular invasion.  This tissue was also found to be strongly positive for both estrogen and progesterone receptors.  Both specimens were also negative for the HER-2/melinda oncogene.    She underwent a PET scan on April 14, 2021 which showed hypermetabolic AP window node measuring 9 mm with an SUV of 7.5, left hilar lymphadenopathy with an SUV of 17.2, irregular lobulated lesion in the right breast measuring 3.7 x 3.8 cm felt to be secondary to recent biopsy, 1.1 cm left upper lobe nodule with an SUV of 9.7, focal area of uptake was noted within the central canal posterior to the T11-12 disc space and subtle area of asymmetric uptake was noted within the right acetabulum felt to be benign.    She completed genetic testing on April 16, 2021 which showed no significant variant.  She went to surgery on April 30, 2021 for a planned bronchoscopy, left video-assisted thoracoscopy and robotic assisted mediastinal lymphadenectomy.  Lymph nodes were harvested from the L5 and L6 region and frozen section revealed metastatic breast cancer.  An attempt was made to find the left upper lobe nodule but it was not able to be identified and the procedure was discontinued.  The final pathology revealed both nodes to be diffusely involved by metastatic moderately differentiated adenocarcinoma felt to be of pulmonary origin.  The L6 node measured 3 cm in greatest dimension and the L5 measured 1.5 cm with extranodal extension noted.  The tissue was found to be negative for PD-L1, ALK and ROS1.  Additionally, she completed an MRI of the brain on May 14, 2021 which showed no evidence of metastatic disease. Therefore this appears to be a T1 N2 M0 adenocarcinoma of lung origin though further evaluation of the thoracic spine is planned for  May 25, 2021.  We then embarked on a course of concurrent chemoradiation therapy described above, 6600 cGy 33 treatments from 825 through July 12, 2021.    Follow-up PET scan on August 5, 2021 showed a decrease in the left lung lesion as well as in the mediastinal and left hilar lymphadenopathy with decrease in hypermetabolism consistent with response to treatment, new segmental left eighth rib fracture and sclerosis of the left seventh rib favoring a nondisplaced fracture, focal uptake in the duodenum and an indeterminate lesion in the L1 vertebral body.  She had had previous MRI of the thoracic spine followed by MRI of the lumbar spine on May 27, 2021 which showed a 6 x 5 x 6 mm enhancing lesion in the posterior body of L1 on the left which was indeterminate and felt to be either a benign hemangioma or bony metastasis.  There was no abnormality appreciated on PET scan and observation was recommended.    MRI of the lumbar spine on October 1, 2021 showed interval enlargement of the enhancing lesion in the left posterior body of L1 now increased to 14 x 14 x 12 mm and extending down to the anterior margin of the left pedicle at L1.  No other lesions were appreciated.  She underwent CT-guided biopsy of this lesion on October 14, 2021 which showed scanty fragments of poorly differentiated carcinoma, PDL negative.  Given these findings further intervention for the breast cancers have been placed on hold.    She went on to MRI of the brain on October 26, 2021 which showed a 5 mm focus of hyperintensity in the right posterior frontal region which was new but without enhancement consistent with a late acute to subacute ischemic infarct.  No other evidence of abnormalities appreciated.  PET scan completed on October 28, 2021 showed a slight decrease in the size of the 9 x 7 mm left upper lobe nodule which is photopenic, hypermetabolic activity in the area of previous radiation with an SUV of 3.6, no lymphadenopathy  otherwise in the chest, resolution of the 6 mm left upper lobe nodule, less fluid in the right breast, no hypermetabolic axillary or subpectoral lymphadenopathy, a new 1.5 cm lytic lesion within the left aspect of L1 with an SUV of 13.3, new lytic lesion at the left L2 with an SUV of 8.1 and a punctate focus of hypermetabolic activity at the anterior aspect of L2 vertebral body which was new.  Additionally a new focus of hypermetabolic activity at a slightly prominent portion of the left adrenal gland is also noted with an SUV of 7.8.    Given these findings, her systemic therapy will be changed to Keytruda and Alimta and she will continue on with the letrozole. I was asked to see the patient at the request of the referring provider noted above for advice and recommendations regarding this diagnosis.     Clinically, she is doing fairly well. She states her breathing and cough are unchanged.  She does complain of low back pain particularly when standing.  This has been present for some time but she does feel that it is worsened.  She is currently taking a couple of Norco a day but feels this is not controlling the pain very well.  She has no specific GI or bowel complaints and no neurologic deficits.    Performance Status: (1) Restricted in physically strenuous activity, ambulatory and able to do work of light nature  Objective      Previous Radiation Details:    Tx Start Date               5/25/2021          Tx End date                 7/12/2021   Intent                           Curative             Total Treatments         33     Prescription Name      LEFT LUNG  Site                              Lung, Left  Primary/Boost             Primary  Dose Per Fraction       200 cGy/Frac  Number of Fractions   33  Prescribe To                Volume PTV  6600 cGy  200 cGy/Frac  Mode                           Photon  Technique                   VMAT  Frequency                   Once Daily  Energy                          6X    Past Medical History: she  has a past medical history of Anxiety, Dyspnea on exertion, SHAYY (generalized anxiety disorder), GERD (gastroesophageal reflux disease), High blood pressure, Hyperlipidemia, Hypothyroidism, Insomnia, Lung cancer (HCC), Lung nodule, Malignant neoplasm of upper-outer quadrant of right breast in female, estrogen receptor positive (HCC) (4/13/2021), Migraine, and PONV (postoperative nausea and vomiting).    Past Surgical History:  she has a past surgical history that includes Wrist surgery (Right); Breast biopsy (Bilateral, 04/07/2021); Closed reduction hip dislocation (Left, 4/30/2021); Colonoscopy; and Venous Access Device (Port) (Right, 5/20/2021).    Meds:    Current Outpatient Medications:   •  ALPRAZolam (XANAX) 0.5 MG tablet, Take 1 tablet by mouth 2 (Two) Times a Day As Needed for Anxiety. for anxiety, Disp: 45 tablet, Rfl: 2  •  apixaban (Eliquis) 5 MG tablet tablet, Take 1 tablet by mouth Every 12 (Twelve) Hours., Disp: 60 tablet, Rfl: 5  •  buPROPion XL (WELLBUTRIN XL) 300 MG 24 hr tablet, Take 1 tablet by mouth Daily., Disp: 30 tablet, Rfl: 5  •  FLUoxetine (PROzac) 40 MG capsule, Take 1 capsule by mouth Daily., Disp: 90 capsule, Rfl: 1  •  gabapentin (NEURONTIN) 300 MG capsule, One in am, 2 in pm, Disp: 90 capsule, Rfl: 0  •  HYDROcodone-acetaminophen (NORCO) 5-325 MG per tablet, Take 1 tablet by mouth Every 6 (Six) Hours As Needed for Moderate Pain ., Disp: 60 tablet, Rfl: 0  •  letrozole (FEMARA) 2.5 MG tablet, Take 1 tablet by mouth Daily., Disp: 90 tablet, Rfl: 3  •  levothyroxine (SYNTHROID, LEVOTHROID) 25 MCG tablet, Take 1 tablet by mouth Daily., Disp: 90 tablet, Rfl: 1  •  lisinopril (PRINIVIL,ZESTRIL) 20 MG tablet, Take 1 tablet by mouth Daily., Disp: 90 tablet, Rfl: 1  •  omeprazole (PrilOSEC) 20 MG capsule, Take 1 capsule by mouth 2 (two) times a day., Disp: 180 capsule, Rfl: 0  •  prochlorperazine (COMPAZINE) 10 MG tablet, TAKE ONE TABLET BY MOUTH EVERY 8 HOURS AS  NEEDED FOR NAUSEA AND VOMITING, Disp: 30 tablet, Rfl: 2  •  propranolol LA (Inderal LA) 60 MG 24 hr capsule, Take 1 capsule by mouth Daily. (Patient taking differently: Take 60 mg by mouth Every Night.), Disp: 90 capsule, Rfl: 1  •  rizatriptan (MAXALT) 10 MG tablet, TAKE 1 TABLET BY MOUTH AT ONSET OF HEADACHE MAY REPEAT ONE TABLET IN 2  HOURS IF NEEDED, Disp: 12 tablet, Rfl: 0  •  simvastatin (ZOCOR) 20 MG tablet, Take 1 tablet by mouth Daily. (Patient taking differently: Take 20 mg by mouth Every Night.), Disp: 90 tablet, Rfl: 1  •  diazePAM (VALIUM) 5 MG tablet, Take 1 tab 30 minutes prior to MRI, Disp: 1 tablet, Rfl: 0  •  folic acid (FOLVITE) 1 MG tablet, Take 1 tablet by mouth Daily. Start 7 days prior to chemotherapy until at least 3 weeks after all chemotherapy., Disp: 30 tablet, Rfl: 3  •  ondansetron (ZOFRAN) 8 MG tablet, Take 1 tablet by mouth 3 (Three) Times a Day As Needed for Nausea or Vomiting., Disp: 30 tablet, Rfl: 5  •  Senexon-S 8.6-50 MG per tablet, TAKE TWO TABLETS BY MOUTH DAILY, Disp: 60 tablet, Rfl: 1  •  sucralfate (CARAFATE) 1 g tablet, Take 1 tablet by mouth 4 (Four) Times a Day., Disp: 120 tablet, Rfl: 3    Allergies:  No Known Allergies    Family History:  her family history includes Alcohol abuse in her brother; Cancer in her father; Colon cancer in her maternal grandmother.    Social History:  she  reports that she has quit smoking. Her smoking use included electronic cigarette and cigarettes. She started smoking about 40 years ago. She smoked 1.00 pack per day. She has never used smokeless tobacco. She reports that she does not drink alcohol and does not use drugs.    Pertinent Findings on   Review of Systems   Constitutional: Negative for appetite change, chills, diaphoresis, fatigue, fever and unexpected weight change.   HENT:   Negative for hearing loss, lump/mass, mouth sores, nosebleeds, sore throat, tinnitus, trouble swallowing and voice change.    Eyes: Negative for eye  problems and icterus.   Respiratory: Positive for shortness of breath. Negative for chest tightness, cough, hemoptysis and wheezing.    Cardiovascular: Negative for chest pain, leg swelling and palpitations.   Gastrointestinal: Negative for abdominal distention, abdominal pain, blood in stool, constipation, diarrhea, nausea, rectal pain and vomiting.   Endocrine: Negative for hot flashes.   Genitourinary: Negative for bladder incontinence, difficulty urinating, dyspareunia, dysuria, frequency, hematuria, menstrual problem, nocturia, pelvic pain, vaginal bleeding and vaginal discharge.    Musculoskeletal: Positive for back pain. Negative for arthralgias, flank pain, gait problem, myalgias, neck pain and neck stiffness.   Skin: Negative for itching, rash and wound.   Neurological: Positive for headaches. Negative for dizziness, extremity weakness, gait problem, light-headedness, numbness, seizures and speech difficulty.   Hematological: Negative for adenopathy. Does not bruise/bleed easily.   Psychiatric/Behavioral: Negative for confusion, decreased concentration, depression, sleep disturbance and suicidal ideas. The patient is nervous/anxious.    :     Subjective   Vitals:    11/01/21 1301   BP: 107/75   Pulse: 70   SpO2: 93%   PainSc:   3   PainLoc: Back       Pertinent Findings on:  Physical Exam  Constitutional:       General: She is not in acute distress.     Appearance: Normal appearance. She is well-developed.   HENT:      Head: Normocephalic and atraumatic.      Nose: Nose normal.      Mouth/Throat:      Dentition: Normal dentition.   Eyes:      Conjunctiva/sclera: Conjunctivae normal.   Pulmonary:      Effort: Pulmonary effort is normal.   Musculoskeletal:         General: Normal range of motion.      Cervical back: Normal range of motion.      Comments: Pain localized to very low SI joint region on the left. No pain with palpation over the upper lumbar spine   Skin:     General: Skin is warm and dry.    Neurological:      Mental Status: She is alert and oriented to person, place, and time.   Psychiatric:         Behavior: Behavior normal. Behavior is cooperative.         Thought Content: Thought content normal.         Judgment: Judgment normal.            Assessment: Primary adenocarcinoma of upper lobe of left lung (HCC) - now with left adrenal and lumbar spine mets    Plan:   We reviewed the recent diagnostic images, specifically the progression in the adrenal and lumbar spine with nice response elsewhere. She has discussed remaining on immunotherapy while adding local treatment to these 2 areas of progressive disease.  We discussed the recent interest in the benefits shown by adding radiation to immunotherapy and the increasing body of literature demonstrating that radiation has the potential to enhance local and systemic anti-tumor immune responses - specifically when given stereotactically.  I think she is a perfect candidate for the above, given the stability of disease elsewhere, size and location of the metastasis and his excellent performance status.     We discussed the possibility of stereotactic treatment to the adrenal lesion and we reviewed the logistics of that process.  I am hoping to deliver 30-40 Gy to the area stereotactically in 3-5 treatments. Concurrently will plan to treat the lumbar spine stereotactically, delivering 16-24 By in 1-2 treatments.     We discussed the acute side effects of the course of treatment which should be mainly slightly irritation of the skin and fatigue.  We discussed the possibility of some irritability of the bowel, nausea, diarrhea and increased gas production though I think the chances of that are small.  We also discussed the long-term effect of the radiation therapy on the surrounding liver, kidneys, bowel  and all that we do in limiting that dose.       After our discussion, the patient agreed to the course of treatment and the consent was signed. We did  begin the treatment planning process today with a CAT scan and will embark on developing the treatment plans. She will return to see Dr. Snell at the conclusion of the radiation to begin immunotherapy.    Objective     My assessment above comes from my review of the imaging, pathology, physician notes and other pertinent information as mentioned.    I personally spent greater than 40 minutes today assessing, managing, discussing and documenting my visit with the patient. That time includes review of records, imaging and pathology reports, obtaining my own history, performing a medically appropriate evaluation, counseling and educating the patient, discussing goals, logistics, alternatives and risks of my recommendations, surveillance options and potential outcomes. It also includes the time documenting the clinical information in the EMR and communicating my recommendations to the other involved physicians.    .

## 2021-11-02 RX ORDER — PROCHLORPERAZINE MALEATE 10 MG
TABLET ORAL
Qty: 30 TABLET | Refills: 2 | Status: SHIPPED | OUTPATIENT
Start: 2021-11-02 | End: 2021-11-26

## 2021-11-02 RX ORDER — RIZATRIPTAN BENZOATE 10 MG/1
TABLET ORAL
Qty: 12 TABLET | Refills: 0 | Status: SHIPPED | OUTPATIENT
Start: 2021-11-02 | End: 2021-12-03

## 2021-11-03 DIAGNOSIS — F41.9 ANXIETY: ICD-10-CM

## 2021-11-04 NOTE — TELEPHONE ENCOUNTER
Rx Refill Note  Requested Prescriptions     Pending Prescriptions Disp Refills   • ALPRAZolam (XANAX) 0.5 MG tablet [Pharmacy Med Name: ALPRAZolam 0.5 MG TABLET] 45 tablet      Sig: TAKE ONE TABLET BY MOUTH TWICE A DAY AS NEEDED FOR ANXIETY      Last office visit with prescribing clinician: 8/3/2021      Next office visit with prescribing clinician: 11/16/2021            Shobha Zavaleta MA/LMR  11/04/21, 16:08 EDT

## 2021-11-05 ENCOUNTER — TELEPHONE (OUTPATIENT)
Dept: ONCOLOGY | Facility: CLINIC | Age: 56
End: 2021-11-05

## 2021-11-05 LAB — REF LAB TEST METHOD: NORMAL

## 2021-11-05 NOTE — TELEPHONE ENCOUNTER
Christiano Palomares's call- advised him to disregard the letter as our precert specialists are working on her case.

## 2021-11-05 NOTE — TELEPHONE ENCOUNTER
Caller: BK CARPENTER    Relationship: Emergency Contact    Best call back number: [phone]    What is the best time to reach you: ANY    What was the call regarding: PATIENTS  STATED HE JUST MISSED A CALL FROM THE OFFICE, WASN'T SURE WHO BUT PROBABLY REGARDING MEDICAL INSURANCE AND COVERING HER MEDS      Do you require a callback: YES

## 2021-11-05 NOTE — TELEPHONE ENCOUNTER
DELETE AFTER REVIEWING: Telephone encounter to be sent to the clinical pool     Caller: BK CARPENTER    Relationship: Emergency Contact    Best call back number: [phone].    What medications are you currently taking:   Current Outpatient Medications on File Prior to Visit   Medication Sig Dispense Refill   • ALPRAZolam (XANAX) 0.5 MG tablet Take 1 tablet by mouth 2 (Two) Times a Day As Needed for Anxiety. for anxiety 45 tablet 2   • apixaban (Eliquis) 5 MG tablet tablet Take 1 tablet by mouth Every 12 (Twelve) Hours. 60 tablet 5   • buPROPion XL (WELLBUTRIN XL) 300 MG 24 hr tablet Take 1 tablet by mouth Daily. 30 tablet 5   • diazePAM (VALIUM) 5 MG tablet Take 1 tab 30 minutes prior to MRI 1 tablet 0   • FLUoxetine (PROzac) 40 MG capsule Take 1 capsule by mouth Daily. 90 capsule 1   • folic acid (FOLVITE) 1 MG tablet Take 1 tablet by mouth Daily. Start 7 days prior to chemotherapy until at least 3 weeks after all chemotherapy. 30 tablet 3   • gabapentin (NEURONTIN) 300 MG capsule One in am, 2 in pm 90 capsule 0   • HYDROcodone-acetaminophen (NORCO) 5-325 MG per tablet Take 1 tablet by mouth Every 6 (Six) Hours As Needed for Moderate Pain . 60 tablet 0   • letrozole (FEMARA) 2.5 MG tablet Take 1 tablet by mouth Daily. 90 tablet 3   • levothyroxine (SYNTHROID, LEVOTHROID) 25 MCG tablet Take 1 tablet by mouth Daily. 90 tablet 1   • lisinopril (PRINIVIL,ZESTRIL) 20 MG tablet Take 1 tablet by mouth Daily. 90 tablet 1   • omeprazole (PrilOSEC) 20 MG capsule Take 1 capsule by mouth 2 (two) times a day. 180 capsule 0   • ondansetron (ZOFRAN) 8 MG tablet Take 1 tablet by mouth 3 (Three) Times a Day As Needed for Nausea or Vomiting. 30 tablet 5   • oxyCODONE-acetaminophen (Percocet) 5-325 MG per tablet Take 1 tablet by mouth Every 6 (Six) Hours As Needed for Moderate Pain . 50 tablet 0   • prochlorperazine (COMPAZINE) 10 MG tablet TAKE ONE TABLET BY MOUTH EVERY 8 HOURS AS NEEDED FOR NAUSEA AND VOMITING 30 tablet 2   •  propranolol LA (Inderal LA) 60 MG 24 hr capsule Take 1 capsule by mouth Daily. (Patient taking differently: Take 60 mg by mouth Every Night.) 90 capsule 1   • rizatriptan (MAXALT) 10 MG tablet TAKE ONE TABLET BY MOUTH AT ONSET OF HEADACHE; MAY REPEAT ONE TABLET IN 2 HOURS IF NEEDED. 12 tablet 0   • Senexon-S 8.6-50 MG per tablet TAKE TWO TABLETS BY MOUTH DAILY 60 tablet 1   • simvastatin (ZOCOR) 20 MG tablet Take 1 tablet by mouth Daily. (Patient taking differently: Take 20 mg by mouth Every Night.) 90 tablet 1   • sucralfate (CARAFATE) 1 g tablet Take 1 tablet by mouth 4 (Four) Times a Day. 120 tablet 3     No current facility-administered medications on file prior to visit.          When did you start taking these medications:  PATIENT HAS NOT STARTED MEDICATION YET.         Who prescribed you this medication: CYRIL SCALES.     What are your concerns: PATIENT'S SPOUSE BK CARPENTER CALLED AND STATED [facility] HAS SENT HIM A LETTER STATING THAT THEY ARE NOT GOING TO COVER THE KEYTRUDA IV MEDICATION DUE TO INFORMATION THAT WAS SENT BY DR NAM'S OFFICE AND DOES NOT MEET [facility] NECESSARY GUIDELINES.  PATIENT HAS AN APPT EARLY NEXT WEEK FOR THIS MEDICATION TO BE DISPENSED.  PATIENT'S  WAS NOT SURE WHAT EXACT DAY THE APPT IS BUT KNOWS IT IS SOMETIME NEXT WEEK.   PATIENT'S  IS CONCERNED ABOUT THIS GETTING TAKEN CARE OF BEFORE THE SCHEDULED APPT.  PLEASE CALL PATIENT'S  BACK ASAP -[phone] ANYTIME AND LEAVE .      How long have you had these concerns: RECEIVED LETTER YESTERDAY THAT WAS DATED 10/29/2021.

## 2021-11-06 RX ORDER — ALPRAZOLAM 0.5 MG/1
TABLET ORAL
Qty: 20 TABLET | Refills: 0 | Status: SHIPPED | OUTPATIENT
Start: 2021-11-06 | End: 2021-11-16 | Stop reason: SDUPTHER

## 2021-11-07 PROCEDURE — 77470 SPECIAL RADIATION TREATMENT: CPT | Performed by: RADIOLOGY

## 2021-11-08 PROCEDURE — 77301 RADIOTHERAPY DOSE PLAN IMRT: CPT | Performed by: RADIOLOGY

## 2021-11-08 PROCEDURE — 77293 RESPIRATOR MOTION MGMT SIMUL: CPT | Performed by: RADIOLOGY

## 2021-11-08 PROCEDURE — 77338 DESIGN MLC DEVICE FOR IMRT: CPT | Performed by: RADIOLOGY

## 2021-11-08 NOTE — TELEPHONE ENCOUNTER
She has an appointment on 11/16/21 for 15 min. There is a wellness after and we are unable to change the amount of time. Would you like to add her to a different day?

## 2021-11-09 ENCOUNTER — PREP FOR SURGERY (OUTPATIENT)
Dept: OTHER | Facility: HOSPITAL | Age: 56
End: 2021-11-09

## 2021-11-09 ENCOUNTER — RADIATION ONCOLOGY WEEKLY ASSESSMENT (OUTPATIENT)
Dept: RADIATION ONCOLOGY | Facility: HOSPITAL | Age: 56
End: 2021-11-09

## 2021-11-09 ENCOUNTER — OFFICE VISIT (OUTPATIENT)
Dept: SURGERY | Facility: CLINIC | Age: 56
End: 2021-11-09

## 2021-11-09 VITALS
OXYGEN SATURATION: 93 % | WEIGHT: 179.45 LBS | BODY MASS INDEX: 31.8 KG/M2 | DIASTOLIC BLOOD PRESSURE: 71 MMHG | RESPIRATION RATE: 16 BRPM | HEART RATE: 93 BPM | SYSTOLIC BLOOD PRESSURE: 113 MMHG | HEIGHT: 63 IN

## 2021-11-09 DIAGNOSIS — Z45.2 ENCOUNTER FOR FITTING AND ADJUSTMENT OF VASCULAR CATHETER: Primary | ICD-10-CM

## 2021-11-09 DIAGNOSIS — C34.92 ADENOCARCINOMA OF LEFT LUNG (HCC): Primary | ICD-10-CM

## 2021-11-09 DIAGNOSIS — Z45.2 ENCOUNTER FOR FITTING AND ADJUSTMENT OF VASCULAR CATHETER: ICD-10-CM

## 2021-11-09 DIAGNOSIS — C34.92 ADENOCARCINOMA OF LEFT LUNG (HCC): ICD-10-CM

## 2021-11-09 DIAGNOSIS — C34.90 NON-SMALL CELL LUNG CANCER METASTATIC TO ADRENAL GLAND (HCC): Primary | ICD-10-CM

## 2021-11-09 DIAGNOSIS — C79.70 NON-SMALL CELL LUNG CANCER METASTATIC TO ADRENAL GLAND (HCC): Primary | ICD-10-CM

## 2021-11-09 PROCEDURE — 77300 RADIATION THERAPY DOSE PLAN: CPT | Performed by: RADIOLOGY

## 2021-11-09 PROCEDURE — 99213 OFFICE O/P EST LOW 20 MIN: CPT | Performed by: THORACIC SURGERY (CARDIOTHORACIC VASCULAR SURGERY)

## 2021-11-09 PROCEDURE — FACE2FACE: Performed by: RADIOLOGY

## 2021-11-09 PROCEDURE — 77373 STRTCTC BDY RAD THER TX DLVR: CPT | Performed by: RADIOLOGY

## 2021-11-09 RX ORDER — SODIUM CHLORIDE 0.9 % (FLUSH) 0.9 %
3-10 SYRINGE (ML) INJECTION AS NEEDED
Status: CANCELLED | OUTPATIENT
Start: 2021-11-29

## 2021-11-09 RX ORDER — SODIUM CHLORIDE 0.9 % (FLUSH) 0.9 %
3 SYRINGE (ML) INJECTION EVERY 12 HOURS SCHEDULED
Status: CANCELLED | OUTPATIENT
Start: 2021-11-29

## 2021-11-09 RX ORDER — CEFAZOLIN SODIUM 2 G/100ML
2 INJECTION, SOLUTION INTRAVENOUS ONCE
Status: CANCELLED | OUTPATIENT
Start: 2021-11-29 | End: 2021-11-09

## 2021-11-09 NOTE — PROGRESS NOTES
"Chief Complaint    Malfunctioning right subclavian venous access port    Subjective          Holli Patel presents to [facility] for H&P for port replacement.  History of Present Illness     Ms. Patel is a 56-year-old female well-known to me.  She has been diagnosed with stage III adenocarcinoma of the lung.  In May of this year I inserted a right subclavian venous access port.  This port was used one time.  After that oncology was unable to aspirate or infuse through the port.  In July the patient had a port study that showed dislodgment of the catheter up into the innominate vein.  She has been having her blood tests, CT scans, and treatments through peripheral IVs.  This no longer is feasible.  She has been returned here for revision of the venous access port.    Objective   Vital Signs:   /71 (BP Location: Left arm, Patient Position: Sitting, Cuff Size: Adult)   Pulse 93   Resp 16   Ht 160 cm (62.99\")   Wt 81.4 kg (179 lb 7.3 oz)   SpO2 93%   BMI 31.80 kg/m²     Physical Exam     Neck: No masses no adenopathy.  Thin neck.    Chest: Port has migrated caudally approximately 4 to 5 cm.  Catheter is not palpable beneath the skin of the chest wall.    Pulmonary: Lungs are clear to auscultation with equal breath sounds bilaterally    Cardiac: Regular rate and rhythm.  No murmur or gallop.  No dependent edema.  Result Review :     Port contrast injection performed July 19, 2021 was independently reviewed.  The catheter has migrated back into the subclavian vein.  The catheter is curled within the subclavian vein and subcutaneous tissues.  There appears to be some clot within the superior vena cava.             Assessment and Plan    Diagnoses and all orders for this visit:    1. Encounter for fitting and adjustment of vascular catheter (Primary)  -     Case request    2. Adenocarcinoma of left lung (HCC)  -     Case request      Clearly this port needs to be " replaced.  I doubt that would go to be able to revise this but I only know that at the time of surgery.  More than likely she will need a right internal jugular placement of the catheter.  It looks like the Clot has been there since July so likelihood of dislodging the clot is very small.  I discussed all this with the patient and her .  I explained the procedure and the risks and benefits.  I have answered all of their questions.  Patient has requested that we proceed.    Case request and preoperative orders have been placed in [facility].  Patient will be scheduled for revision/replacement of the right subclavian venous access port at [facility] in the near future.  I will keep you informed of her progress.  Thank you for allowing me to participate in the care of Ms. Patel.      I spent 29 minutes caring for Holli on this date of service. This time includes time spent by me in the following activities:preparing for the visit, reviewing tests, obtaining and/or reviewing a separately obtained history, performing a medically appropriate examination and/or evaluation , counseling and educating the patient/family/caregiver, ordering medications, tests, or procedures, referring and communicating with other health care professionals , documenting information in the medical record, independently interpreting results and communicating that information with the patient/family/caregiver and care coordination  Follow Up   Return for Return to the office following surgery.  Patient was given instructions and counseling regarding her condition or for health maintenance advice. Please see specific information pulled into the AVS if appropriate.

## 2021-11-09 NOTE — PROGRESS NOTES
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 Back stable, doing well pretreatment, no problems.    Treatment Number and Dose: Weekly Status Check (1/5 to adrenal - 700/3500)     Notes on treatment course, films, medical progress and plan:    Patient was placed in treatment position and CBCT images were acquired for target localization. Adjustments were made to ensure the set up agreed with the computerized stereotactic plan. All images were approved prior to treatment starting.     The patient did well, tolerated treatment without difficulty. No problems or questions.     Subjective     I was personally present at the machine for the set up and delivery of the above treatment. The Medical Physicist was also in attendance during patient set up, verification and treatment delivery to ensure the parameters agreed with the treatment plan.     I provided direct supervision of the patient's set up, treatment parameters and image guidance. I provided corrections and approval of the above prior to the treatment, in real time. I was present for any adjustments required due to patient motion, target movement or equipment issues.    The ongoing images for localization, tumor tracking, gating and beam's eye view localization images will also be approved.     Problem added:  None  Medications added:   None  Ancillary referrals made: None    I have reviewed the current medications, allergies and problem list in the patients EMR.

## 2021-11-10 PROCEDURE — 77338 DESIGN MLC DEVICE FOR IMRT: CPT | Performed by: RADIOLOGY

## 2021-11-10 PROCEDURE — 77300 RADIATION THERAPY DOSE PLAN: CPT | Performed by: RADIOLOGY

## 2021-11-11 ENCOUNTER — RADIATION ONCOLOGY WEEKLY ASSESSMENT (OUTPATIENT)
Dept: RADIATION ONCOLOGY | Facility: HOSPITAL | Age: 56
End: 2021-11-11

## 2021-11-11 DIAGNOSIS — C79.70 NON-SMALL CELL LUNG CANCER METASTATIC TO ADRENAL GLAND (HCC): Primary | ICD-10-CM

## 2021-11-11 DIAGNOSIS — C34.90 NON-SMALL CELL LUNG CANCER METASTATIC TO ADRENAL GLAND (HCC): Primary | ICD-10-CM

## 2021-11-11 PROCEDURE — 77373 STRTCTC BDY RAD THER TX DLVR: CPT | Performed by: RADIOLOGY

## 2021-11-11 PROCEDURE — 77435 SBRT MANAGEMENT: CPT | Performed by: RADIOLOGY

## 2021-11-11 NOTE — PROGRESS NOTES
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 Back again unchanged, same pain  Meds. No problems with abdominal treatment. Feels good.     Treatment Number and Dose: Weekly Status Check (2/5 to adrenal - 1400/3500)     Notes on treatment course, films, medical progress and plan:    Patient was placed in treatment position and CBCT images were acquired for target localization. Adjustments were made to ensure the set up agreed with the computerized stereotactic plan. All images were approved prior to treatment starting.     The patient did well, tolerated treatment without difficulty. No problems or questions.     Subjective     I was personally present at the machine for the set up and delivery of the above treatment. The Medical Physicist was also in attendance during patient set up, verification and treatment delivery to ensure the parameters agreed with the treatment plan.     I provided direct supervision of the patient's set up, treatment parameters and image guidance. I provided corrections and approval of the above prior to the treatment, in real time. I was present for any adjustments required due to patient motion, target movement or equipment issues.    The ongoing images for localization, tumor tracking, gating and beam's eye view localization images will also be approved.     Problem added:  None  Medications added:   None  Ancillary referrals made: None    I have reviewed the current medications, allergies and problem list in the patients EMR.

## 2021-11-15 ENCOUNTER — RADIATION ONCOLOGY WEEKLY ASSESSMENT (OUTPATIENT)
Dept: RADIATION ONCOLOGY | Facility: HOSPITAL | Age: 56
End: 2021-11-15

## 2021-11-15 DIAGNOSIS — C34.12 PRIMARY ADENOCARCINOMA OF UPPER LOBE OF LEFT LUNG (HCC): Primary | ICD-10-CM

## 2021-11-15 PROCEDURE — 77373 STRTCTC BDY RAD THER TX DLVR: CPT | Performed by: RADIOLOGY

## 2021-11-16 ENCOUNTER — OFFICE VISIT (OUTPATIENT)
Dept: FAMILY MEDICINE CLINIC | Facility: CLINIC | Age: 56
End: 2021-11-16

## 2021-11-16 VITALS
TEMPERATURE: 97.2 F | BODY MASS INDEX: 32.11 KG/M2 | SYSTOLIC BLOOD PRESSURE: 90 MMHG | WEIGHT: 181.2 LBS | HEART RATE: 98 BPM | DIASTOLIC BLOOD PRESSURE: 60 MMHG | OXYGEN SATURATION: 100 % | HEIGHT: 63 IN

## 2021-11-16 DIAGNOSIS — Z86.69 HISTORY OF MIGRAINE: ICD-10-CM

## 2021-11-16 DIAGNOSIS — C34.12 PRIMARY ADENOCARCINOMA OF UPPER LOBE OF LEFT LUNG (HCC): ICD-10-CM

## 2021-11-16 DIAGNOSIS — Z85.3 HISTORY OF BILATERAL BREAST CANCER: ICD-10-CM

## 2021-11-16 DIAGNOSIS — E03.8 OTHER SPECIFIED HYPOTHYROIDISM: ICD-10-CM

## 2021-11-16 DIAGNOSIS — I10 ESSENTIAL HYPERTENSION: ICD-10-CM

## 2021-11-16 DIAGNOSIS — F41.9 ANXIETY: Primary | ICD-10-CM

## 2021-11-16 DIAGNOSIS — E78.2 MIXED HYPERLIPIDEMIA: ICD-10-CM

## 2021-11-16 PROBLEM — Z86.718 HISTORY OF DVT (DEEP VEIN THROMBOSIS): Status: ACTIVE | Noted: 2021-11-16

## 2021-11-16 PROBLEM — G43.119 INTRACTABLE MIGRAINE WITH AURA WITHOUT STATUS MIGRAINOSUS: Status: RESOLVED | Noted: 2020-09-29 | Resolved: 2021-11-16

## 2021-11-16 PROCEDURE — 99214 OFFICE O/P EST MOD 30 MIN: CPT | Performed by: FAMILY MEDICINE

## 2021-11-16 PROCEDURE — 77336 RADIATION PHYSICS CONSULT: CPT | Performed by: RADIOLOGY

## 2021-11-16 RX ORDER — ALPRAZOLAM 0.5 MG/1
0.5 TABLET ORAL 2 TIMES DAILY PRN
Qty: 60 TABLET | Refills: 2 | Status: SHIPPED | OUTPATIENT
Start: 2021-11-16 | End: 2022-02-16 | Stop reason: SDUPTHER

## 2021-11-16 NOTE — PROGRESS NOTES
Subjective   Holli Patel is a 56 y.o. female.     Vitals:    11/16/21 1438   BP: 90/60   Pulse: 98   Temp: 97.2 °F (36.2 °C)   SpO2: 100%        Chief Complaint   Patient presents with   • Anxiety     MED REFILLS   • Hyperlipidemia   • Hypertension        History of Present Illness    3-month follow-up for chronic SHAYY, metastatic lung cancer, and bilateral breast cancer  And  6-month follow-up for HTN, hyperlipidemia, migraines, and hypothyroidism    LOV with me in August for uncontrolled SHAYY after new diagnosis of metastatic lung cancer and bilateral breast carcinoma.  At that visit, her Wellbutrin was increased from 150 mg to 300 mg daily and quantity of her Xanax was increased from 1-1.5 daily.  On a good note, she has quit smoking!    Currently, still undergoing intense XRT treatment for the metastatic lung cancer to her spine and adrenals.  The plan is to undergo the breast surgery after she has completed treatment for the lung carcinoma.  At this point, she is no longer working and is using her long-term insurance policy with [employer] --this remains in effect till July 2022.  She desires to go back to work if possible; however, may need to pursue long-term disability if all surgeries and treatment are not completed by July 2022.    For the most part, her anxiety has been under better control with the increased dose of Wellbutrin XL to 300 mg daily and allowing for Xanax 0.5 mg a quantity of 1.5 nightly.  However, she still often finds herself needing an extra half of a Xanax due to all the MRIs, testing, and biopsies.  Pain control has been managed by her oncologist/Dr. Snell and will occasionally receive a Valium prior to an MRI procedure.  Star report reviewed by me today shows last refill for Xanax quantity of 20 on 11/6/2021, this quantity was given to last until today's appointment.    Uncertain about needed refills today?  Overdue for fasting lipids, has plans to obtain blood work next week at  oncologist.  Compliant with and tolerates all medications without side effects.  Weight stable.  Headaches under much better control.    The following portions of the patient's history were reviewed and updated as appropriate: allergies, current medications, past family history, past medical history, past social history, past surgical history and problem list.    Review of Systems   Constitutional: Negative for fever, unexpected weight gain and unexpected weight loss.   Respiratory: Negative for cough and shortness of breath.    Cardiovascular: Negative for chest pain.       Objective   Physical Exam  Vitals and nursing note reviewed.   Constitutional:       Appearance: Normal appearance. She is well-developed. She is obese.   HENT:      Head: Normocephalic and atraumatic.      Nose: Nose normal.   Eyes:      Conjunctiva/sclera: Conjunctivae normal.      Pupils: Pupils are equal, round, and reactive to light.   Neck:      Thyroid: No thyromegaly.   Cardiovascular:      Rate and Rhythm: Normal rate and regular rhythm.      Heart sounds: Normal heart sounds. No murmur heard.      Pulmonary:      Effort: Pulmonary effort is normal.      Breath sounds: Normal breath sounds.   Abdominal:      General: Abdomen is flat. Bowel sounds are normal. There is no distension.      Palpations: Abdomen is soft. There is no hepatomegaly, splenomegaly or mass.      Tenderness: There is no abdominal tenderness. There is no guarding or rebound.      Hernia: No hernia is present.   Musculoskeletal:         General: Normal range of motion.      Cervical back: Normal range of motion and neck supple.      Right lower leg: No edema.      Left lower leg: No edema.   Lymphadenopathy:      Cervical: No cervical adenopathy.   Skin:     General: Skin is warm.   Neurological:      General: No focal deficit present.      Mental Status: She is alert.   Psychiatric:         Mood and Affect: Mood normal.         Behavior: Behavior normal.          Thought Content: Thought content normal.         Judgment: Judgment normal.          LABS/STUDIES  -October 2021 --CBC, CMP WNL, last TSH normal in September  Last lipids/ in November 2020    Procedures     Assessment/Plan   Diagnoses and all orders for this visit:    1. Anxiety (Primary)  -fair control.  Star report reviewed, appropriate.  Will make slight adjustment with quantity of Xanax, increased from 45 per 30 days to a quantity of 60 for 30 days.  Refill Xanax as directed below.  Continue Wellbutrin  mg daily, will refill upon request.  -     ALPRAZolam (XANAX) 0.5 MG tablet; Take 1 tablet by mouth 2 (Two) Times a Day As Needed for Anxiety.  Dispense: 60 tablet; Refill: 2    2. Mixed hyperlipidemia  --controlled.  Overdue to recheck lipids, order placed  Ideally goal LDL needs to be less than 100, at least less than 130 given history of HTN.  If at goal then no change with Zocor.  Continue Zocor 20 mg 1 p.o. daily, will refill upon request.  Needs low-carb/low calorie/low cholesterol diet and increase cardio exercise to greater than 150 minutes weekly  -     Lipid Panel With LDL / HDL Ratio; Future    3. Essential hypertension  -well-controlled.  No change of medication.  Continue Prinivil 20 mg daily, will refill upon request    4. Other specified hypothyroidism  -appears controlled.  Orders are placed to recheck TSH in near future, given radiation treatment to chest/neck.  If TSH remains therapeutic then continue Synthroid 25 mcg 1 p.o. every morning, will refill upon request    5. History of migraine  -stable.  Continue Maxalt as prescribed, will refill upon request  Continue Inderal LA 60 mg 1 p.o. daily, will refill upon request.    6. Primary adenocarcinoma of upper lobe of left lung (HCC)  -with metastases to adrenal and bone.  Treatment plans per specialist    7. History of bilateral breast cancer  -plan for breast surgery after completion of lung carcinoma treatment in near  future.  Treatment plans per oncologist and surgeon.                 Wore goggles and face mask during entire visit.    Return in about 3 months (around 2/16/2022) for Recheck, Annual physical.

## 2021-11-16 NOTE — PATIENT INSTRUCTIONS
Get lipids ordered, next week with lab draw    Needs flu shot    Recommend low fat/low calorie diet and exercise greater than 150 minutes of cardio per week.   Continue current treatment plan.

## 2021-11-16 NOTE — PROGRESS NOTES
Physician Stereotactic Management Note    Diagnosis:   Primary adenocarcinoma of upper lobe of left lung (HCC)    Seen prior to treatment, no problems. Back pain better with meds.     Treatment Number and Dose: Weekly Status Check (3/5 - 2100/3500 to adrenal; 1/3 - 900/2700 to spine)     Notes on treatment course, films, medical progress and plan:    Patient was placed in treatment position and CBCT images were acquired for target localization. Adjustments were made to ensure the set up agreed with the computerized stereotactic plan. All images were approved prior to treatment starting.     The patient did well, tolerated treatment without difficulty. No problems or questions. Discussed possibility of nausea, has zofran at home, instructed how to take and phone us if any sxs prior to next treatment.     Subjective     I was personally present at the machine for the set up and delivery of the above treatment. The Medical Physicist was also in attendance during patient set up, verification and treatment delivery to ensure the parameters agreed with the treatment plan.     I provided direct supervision of the patient's set up, treatment parameters and image guidance. I provided corrections and approval of the above prior to the treatment, in real time. I was present for any adjustments required due to patient motion, target movement or equipment issues.    The ongoing images for localization, tumor tracking, gating and beam's eye view localization images will also be approved.     Problem added:  None  Medications added:   None  Ancillary referrals made: None    I have reviewed the current medications, allergies and problem list in the patients EMR.

## 2021-11-17 ENCOUNTER — RADIATION ONCOLOGY WEEKLY ASSESSMENT (OUTPATIENT)
Dept: RADIATION ONCOLOGY | Facility: HOSPITAL | Age: 56
End: 2021-11-17

## 2021-11-17 DIAGNOSIS — C34.90 NON-SMALL CELL LUNG CANCER METASTATIC TO ADRENAL GLAND (HCC): Primary | ICD-10-CM

## 2021-11-17 DIAGNOSIS — C79.51 NON-SMALL CELL LUNG CANCER METASTATIC TO BONE (HCC): ICD-10-CM

## 2021-11-17 DIAGNOSIS — C79.70 NON-SMALL CELL LUNG CANCER METASTATIC TO ADRENAL GLAND (HCC): Primary | ICD-10-CM

## 2021-11-17 DIAGNOSIS — C34.12 PRIMARY ADENOCARCINOMA OF UPPER LOBE OF LEFT LUNG (HCC): ICD-10-CM

## 2021-11-17 DIAGNOSIS — C34.90 NON-SMALL CELL LUNG CANCER METASTATIC TO BONE (HCC): ICD-10-CM

## 2021-11-17 PROCEDURE — 77373 STRTCTC BDY RAD THER TX DLVR: CPT | Performed by: RADIOLOGY

## 2021-11-17 RX ORDER — DEXAMETHASONE 4 MG/1
TABLET ORAL
Qty: 6 TABLET | Refills: 3 | OUTPATIENT
Start: 2021-11-17

## 2021-11-17 NOTE — PROGRESS NOTES
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 Also took zofran pretreatment today as instructed. Feels good now.     Treatment Number and Dose: Weekly Status Check (4/5 - 2800/3500 to adrenal; 2/3 - 1800/2700 to spine)     Notes on treatment course, films, medical progress and plan:    Patient was placed in treatment position and CBCT images were acquired for target localization. Adjustments were made to ensure the set up agreed with the computerized stereotactic plan. All images were approved prior to treatment starting.     Take additional zofran if nausea recurs today and let us know.     Subjective     I was personally present at the machine for the set up and delivery of the above treatment. The Medical Physicist was also in attendance during patient set up, verification and treatment delivery to ensure the parameters agreed with the treatment plan.     I provided direct supervision of the patient's set up, treatment parameters and image guidance. I provided corrections and approval of the above prior to the treatment, in real time. I was present for any adjustments required due to patient motion, target movement or equipment issues.    The ongoing images for localization, tumor tracking, gating and beam's eye view localization images will also be approved.     Problem added:  None  Medications added:   None  Ancillary referrals made: None    I have reviewed the current medications, allergies and problem list in the patients EMR.

## 2021-11-19 ENCOUNTER — OFFICE VISIT (OUTPATIENT)
Dept: ONCOLOGY | Facility: CLINIC | Age: 56
End: 2021-11-19

## 2021-11-19 ENCOUNTER — APPOINTMENT (OUTPATIENT)
Dept: LAB | Facility: HOSPITAL | Age: 56
End: 2021-11-19

## 2021-11-19 ENCOUNTER — RADIATION ONCOLOGY WEEKLY ASSESSMENT (OUTPATIENT)
Dept: RADIATION ONCOLOGY | Facility: HOSPITAL | Age: 56
End: 2021-11-19

## 2021-11-19 ENCOUNTER — DOCUMENTATION (OUTPATIENT)
Dept: RADIATION ONCOLOGY | Facility: HOSPITAL | Age: 56
End: 2021-11-19

## 2021-11-19 VITALS — BODY MASS INDEX: 32.17 KG/M2 | WEIGHT: 181.6 LBS

## 2021-11-19 DIAGNOSIS — C50.212 MALIGNANT NEOPLASM OF UPPER-INNER QUADRANT OF LEFT BREAST IN FEMALE, ESTROGEN RECEPTOR POSITIVE (HCC): ICD-10-CM

## 2021-11-19 DIAGNOSIS — C34.90 NON-SMALL CELL LUNG CANCER METASTATIC TO BONE (HCC): ICD-10-CM

## 2021-11-19 DIAGNOSIS — C34.12 PRIMARY ADENOCARCINOMA OF UPPER LOBE OF LEFT LUNG (HCC): Primary | ICD-10-CM

## 2021-11-19 DIAGNOSIS — Z17.0 MALIGNANT NEOPLASM OF UPPER-INNER QUADRANT OF LEFT BREAST IN FEMALE, ESTROGEN RECEPTOR POSITIVE (HCC): ICD-10-CM

## 2021-11-19 DIAGNOSIS — C34.90 NON-SMALL CELL LUNG CANCER METASTATIC TO ADRENAL GLAND (HCC): Primary | ICD-10-CM

## 2021-11-19 DIAGNOSIS — C79.51 NON-SMALL CELL LUNG CANCER METASTATIC TO BONE (HCC): ICD-10-CM

## 2021-11-19 DIAGNOSIS — C79.70 NON-SMALL CELL LUNG CANCER METASTATIC TO ADRENAL GLAND (HCC): Primary | ICD-10-CM

## 2021-11-19 PROCEDURE — 77373 STRTCTC BDY RAD THER TX DLVR: CPT | Performed by: RADIOLOGY

## 2021-11-19 PROCEDURE — 99214 OFFICE O/P EST MOD 30 MIN: CPT | Performed by: NURSE PRACTITIONER

## 2021-11-19 RX ORDER — FOLIC ACID 1 MG/1
1 TABLET ORAL DAILY
Qty: 30 TABLET | Refills: 3 | Status: SHIPPED | OUTPATIENT
Start: 2021-11-19 | End: 2022-04-04 | Stop reason: SDUPTHER

## 2021-11-19 NOTE — PROGRESS NOTES
[facility] Hematology/Oncology Treatment Plan Summary    Name: Holli Patel  [facility]# [id number]  MD: Dr. Snell    Diagnosis:     ICD-10-CM ICD-9-CM   1. Primary adenocarcinoma of upper lobe of left lung (HCC)  C34.12 162.3     Stage: IV      Goal of chemotherapy: palliative    Treatment Medication(s):   1. Alimta  2. Keytruda    Frequency: every 3 weeks.    Number of cycles: To be determined.    Starting on: 11/22/2021    Repeat after 3-4 cycles: CT Scan and PET Scan    Items for home use: Imodium AD (for diarrhea), Tylenol (for fever and/or pain) and Thermometer    Rx written for: [x] Nausea    [] Pre-Chemo   folic acid 1 gram daily by mouth and ondansetron 8 mg by mouth every 8 hours as needed for nausea      Completing Provider: KORI Edwards           Date/time: 11/19/2021      Please note: You will be seen by a provider frequently with your treatment plan. This plan may change depending on many factors, if so, this will be discussed with you by your physician.  Last update 12/2020.

## 2021-11-19 NOTE — PROGRESS NOTES
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 No emesis, feels good, no problems.    Treatment Number and Dose: Radiation (3/3 - 2700/2700, 5/5 - 3500/3500)       Notes on treatment course, films, medical progress and plan:    Patient was placed in treatment position and CBCT images were acquired for target localization. Adjustments were made to ensure the set up agreed with the computerized stereotactic plan. All images were approved prior to treatment starting.     The patient did well, tolerated treatment without difficulty. No problems or questions. Going to [facility] to continue systemic treatment immediately. Will discuss follow up imaging with them and see back thereafter.    Subjective     I was personally present at the machine for the set up and delivery of the above treatment. The Medical Physicist was also in attendance during patient set up, verification and treatment delivery to ensure the parameters agreed with the treatment plan.     I provided direct supervision of the patient's set up, treatment parameters and image guidance. I provided corrections and approval of the above prior to the treatment, in real time. I was present for any adjustments required due to patient motion, target movement or equipment issues.    The ongoing images for localization, tumor tracking, gating and beam's eye view localization images will also be approved.     Problem added:  None  Medications added:   None  Ancillary referrals made: None    I have reviewed the current medications, allergies and problem list in the patients EMR.

## 2021-11-19 NOTE — PROGRESS NOTES
____________________PATIENT EDUCATION____________________    PATIENT EDUCATION:  Today I met with the patient to discuss the chemotherapy regimen recommended for treatment of his/her Primary adenocarcinoma of upper lobe of left lung (HCC)  .  The patient was given explanation of treatment premed side effects including office policy that prohibits patients to drive if sedating medications are administered, MD explanation given regarding benefits, side effects, toxicities and goals of treatment.  The patient received a Chemotherapy/Biotherapy Plan Summary including diagnosis and explanation of specific treatment plan.    SIDE EFFECTS:  Common side effects were discussed with the patient .  Discussion included where applicable hair loss/discoloration, anemia/fatigue, infection/chills/fever, appetite, bleeding risk/precautions, constipation, diarrhea, mouth sores, taste alteration, loss of appetite, nausea/vomiting, peripheral neuropathy, skin/nail changes, rash, muscle aches/weakness, liver damage, lung damage, kidney damage, DVT/PE risk, fluid retention, somnolence, electrolyte/LFT imbalance, vein exercises and/or the possible need for vascular access/port placement.  The patient was advised that although uncommon, leakage of an infused medication from the vein or venous access device may lead to skin breakdown and/or other tissue damage.  The patient was advised that he/she may have pain, bleeding, and/or bruising from the insertion of a needle in their vein or venous access device (port).  The patient was further advised that, in spite of proper technique, infection with redness and irritation may rarely occur at the site where the needle was inserted.  The patient was advised that if complications occur, additional medical treatment is available.  Finally, where applicable we have reviewed rare but potential immune mediated side effects including shortness of breath, cough, chest pain (pneumonitis), abdominal  pain, diarrhea (colitis), thyroiditis (hypothyroid or hyperthyroid), hepatitis and liver dysfunction, nephritis and renal dysfunction.    Discussion also included side effects specific to drugs in the treatment plan, specifically Keytruda, Alimta.    I spent 30 minutes caring for Holli on this date of service. This time includes time spent by me in the following activities: preparing for the visit, reviewing tests, counseling and educating the patient/family/caregiver, ordering medications, tests, or procedures, documenting information in the medical record and care coordination.      Brooklynn Alvarado, APRN  11/19/2021

## 2021-11-22 ENCOUNTER — INFUSION (OUTPATIENT)
Dept: ONCOLOGY | Facility: HOSPITAL | Age: 56
End: 2021-11-22

## 2021-11-22 ENCOUNTER — OFFICE VISIT (OUTPATIENT)
Dept: ONCOLOGY | Facility: CLINIC | Age: 56
End: 2021-11-22

## 2021-11-22 VITALS
HEART RATE: 87 BPM | OXYGEN SATURATION: 95 % | TEMPERATURE: 97.1 F | HEIGHT: 63 IN | DIASTOLIC BLOOD PRESSURE: 70 MMHG | BODY MASS INDEX: 32.02 KG/M2 | RESPIRATION RATE: 16 BRPM | SYSTOLIC BLOOD PRESSURE: 101 MMHG | WEIGHT: 180.7 LBS

## 2021-11-22 DIAGNOSIS — C34.12 PRIMARY ADENOCARCINOMA OF UPPER LOBE OF LEFT LUNG (HCC): Primary | ICD-10-CM

## 2021-11-22 DIAGNOSIS — C50.212 MALIGNANT NEOPLASM OF UPPER-INNER QUADRANT OF LEFT BREAST IN FEMALE, ESTROGEN RECEPTOR POSITIVE (HCC): Primary | ICD-10-CM

## 2021-11-22 DIAGNOSIS — C34.12 PRIMARY ADENOCARCINOMA OF UPPER LOBE OF LEFT LUNG (HCC): ICD-10-CM

## 2021-11-22 DIAGNOSIS — Z17.0 MALIGNANT NEOPLASM OF UPPER-INNER QUADRANT OF LEFT BREAST IN FEMALE, ESTROGEN RECEPTOR POSITIVE (HCC): Primary | ICD-10-CM

## 2021-11-22 LAB
ALBUMIN SERPL-MCNC: 4.1 G/DL (ref 3.5–5.2)
ALBUMIN SERPL-MCNC: 4.2 G/DL (ref 3.5–5.2)
ALBUMIN/GLOB SERPL: 1.2 G/DL (ref 1.1–2.4)
ALBUMIN/GLOB SERPL: 1.4 G/DL (ref 1.1–2.4)
ALP SERPL-CCNC: 110 U/L (ref 38–116)
ALP SERPL-CCNC: 114 U/L (ref 38–116)
ALT SERPL W P-5'-P-CCNC: 11 U/L (ref 0–33)
ALT SERPL W P-5'-P-CCNC: 12 U/L (ref 0–33)
ANION GAP SERPL CALCULATED.3IONS-SCNC: 10.6 MMOL/L (ref 5–15)
ANION GAP SERPL CALCULATED.3IONS-SCNC: 8 MMOL/L (ref 5–15)
AST SERPL-CCNC: 15 U/L (ref 0–32)
AST SERPL-CCNC: 23 U/L (ref 0–32)
BASOPHILS # BLD AUTO: 0.06 10*3/MM3 (ref 0–0.2)
BASOPHILS NFR BLD AUTO: 1.1 % (ref 0–1.5)
BILIRUB SERPL-MCNC: 0.2 MG/DL (ref 0.2–1.2)
BILIRUB SERPL-MCNC: 0.2 MG/DL (ref 0.2–1.2)
BUN SERPL-MCNC: 16 MG/DL (ref 6–20)
BUN SERPL-MCNC: 17 MG/DL (ref 6–20)
BUN/CREAT SERPL: 15.2 (ref 7.3–30)
BUN/CREAT SERPL: 16.7 (ref 7.3–30)
CALCIUM SPEC-SCNC: 9.6 MG/DL (ref 8.5–10.2)
CALCIUM SPEC-SCNC: 9.7 MG/DL (ref 8.5–10.2)
CHLORIDE SERPL-SCNC: 103 MMOL/L (ref 98–107)
CHLORIDE SERPL-SCNC: 103 MMOL/L (ref 98–107)
CO2 SERPL-SCNC: 23.4 MMOL/L (ref 22–29)
CO2 SERPL-SCNC: 26 MMOL/L (ref 22–29)
CREAT SERPL-MCNC: 1.02 MG/DL (ref 0.6–1.1)
CREAT SERPL-MCNC: 1.05 MG/DL (ref 0.6–1.1)
DEPRECATED RDW RBC AUTO: 44.6 FL (ref 37–54)
EOSINOPHIL # BLD AUTO: 0.34 10*3/MM3 (ref 0–0.4)
EOSINOPHIL NFR BLD AUTO: 6 % (ref 0.3–6.2)
ERYTHROCYTE [DISTWIDTH] IN BLOOD BY AUTOMATED COUNT: 13.4 % (ref 12.3–15.4)
GFR SERPL CREATININE-BSD FRML MDRD: 54 ML/MIN/1.73
GFR SERPL CREATININE-BSD FRML MDRD: 56 ML/MIN/1.73
GLOBULIN UR ELPH-MCNC: 2.9 GM/DL (ref 1.8–3.5)
GLOBULIN UR ELPH-MCNC: 3.5 GM/DL (ref 1.8–3.5)
GLUCOSE SERPL-MCNC: 93 MG/DL (ref 74–124)
GLUCOSE SERPL-MCNC: 99 MG/DL (ref 74–124)
HCT VFR BLD AUTO: 37 % (ref 34–46.6)
HGB BLD-MCNC: 11 G/DL (ref 12–15.9)
IMM GRANULOCYTES # BLD AUTO: 0.04 10*3/MM3 (ref 0–0.05)
IMM GRANULOCYTES NFR BLD AUTO: 0.7 % (ref 0–0.5)
LAB AP CASE REPORT: NORMAL
LAB AP CLINICAL INFORMATION: NORMAL
LAB AP DIAGNOSIS COMMENT: NORMAL
LAB AP SPECIAL STAINS: NORMAL
LYMPHOCYTES # BLD AUTO: 0.78 10*3/MM3 (ref 0.7–3.1)
LYMPHOCYTES NFR BLD AUTO: 13.7 % (ref 19.6–45.3)
Lab: NORMAL
MCH RBC QN AUTO: 26.7 PG (ref 26.6–33)
MCHC RBC AUTO-ENTMCNC: 29.7 G/DL (ref 31.5–35.7)
MCV RBC AUTO: 89.8 FL (ref 79–97)
MONOCYTES # BLD AUTO: 0.62 10*3/MM3 (ref 0.1–0.9)
MONOCYTES NFR BLD AUTO: 10.9 % (ref 5–12)
NEUTROPHILS NFR BLD AUTO: 3.86 10*3/MM3 (ref 1.7–7)
NEUTROPHILS NFR BLD AUTO: 67.6 % (ref 42.7–76)
NRBC BLD AUTO-RTO: 0 /100 WBC (ref 0–0.2)
PATH REPORT.ADDENDUM SPEC: NORMAL
PATH REPORT.FINAL DX SPEC: NORMAL
PATH REPORT.GROSS SPEC: NORMAL
PLATELET # BLD AUTO: 291 10*3/MM3 (ref 140–450)
PMV BLD AUTO: 9.2 FL (ref 6–12)
POTASSIUM SERPL-SCNC: 5.3 MMOL/L (ref 3.5–4.7)
POTASSIUM SERPL-SCNC: 5.4 MMOL/L (ref 3.5–4.7)
PROT SERPL-MCNC: 7.1 G/DL (ref 6.3–8)
PROT SERPL-MCNC: 7.6 G/DL (ref 6.3–8)
RBC # BLD AUTO: 4.12 10*6/MM3 (ref 3.77–5.28)
SODIUM SERPL-SCNC: 137 MMOL/L (ref 134–145)
SODIUM SERPL-SCNC: 137 MMOL/L (ref 134–145)
T4 FREE SERPL-MCNC: 1.07 NG/DL (ref 0.93–1.7)
TSH SERPL DL<=0.05 MIU/L-ACNC: 2.21 UIU/ML (ref 0.27–4.2)
WBC NRBC COR # BLD: 5.7 10*3/MM3 (ref 3.4–10.8)

## 2021-11-22 PROCEDURE — 80053 COMPREHEN METABOLIC PANEL: CPT

## 2021-11-22 PROCEDURE — 85025 COMPLETE CBC W/AUTO DIFF WBC: CPT

## 2021-11-22 PROCEDURE — 96411 CHEMO IV PUSH ADDL DRUG: CPT

## 2021-11-22 PROCEDURE — 84439 ASSAY OF FREE THYROXINE: CPT | Performed by: INTERNAL MEDICINE

## 2021-11-22 PROCEDURE — 96417 CHEMO IV INFUS EACH ADDL SEQ: CPT

## 2021-11-22 PROCEDURE — 99214 OFFICE O/P EST MOD 30 MIN: CPT | Performed by: INTERNAL MEDICINE

## 2021-11-22 PROCEDURE — 25010000002 PEMETREXED PER 10 MG: Performed by: INTERNAL MEDICINE

## 2021-11-22 PROCEDURE — 25010000002 PEMBROLIZUMAB 100 MG/4ML SOLUTION 4 ML VIAL: Performed by: INTERNAL MEDICINE

## 2021-11-22 PROCEDURE — 84443 ASSAY THYROID STIM HORMONE: CPT | Performed by: INTERNAL MEDICINE

## 2021-11-22 PROCEDURE — 96413 CHEMO IV INFUSION 1 HR: CPT

## 2021-11-22 RX ORDER — SODIUM CHLORIDE 9 MG/ML
250 INJECTION, SOLUTION INTRAVENOUS ONCE
Status: COMPLETED | OUTPATIENT
Start: 2021-11-22 | End: 2021-11-22

## 2021-11-22 RX ORDER — SODIUM CHLORIDE 9 MG/ML
250 INJECTION, SOLUTION INTRAVENOUS ONCE
Status: CANCELLED | OUTPATIENT
Start: 2021-11-22

## 2021-11-22 RX ADMIN — SODIUM CHLORIDE 250 ML: 9 INJECTION, SOLUTION INTRAVENOUS at 10:58

## 2021-11-22 RX ADMIN — SODIUM CHLORIDE 930 MG: 900 INJECTION INTRAVENOUS at 11:28

## 2021-11-22 RX ADMIN — SODIUM CHLORIDE 200 MG: 900 INJECTION INTRAVENOUS at 10:58

## 2021-11-22 NOTE — NURSING NOTE
Rechecked CMP r/t high K+. Received the okay to treat with Dr. Snlel. K+ came back at 5.3 the second time, Dr. Snell wants to monitor at this time.

## 2021-11-22 NOTE — PROGRESS NOTES
Subjective   Holli Patel is a 56 y.o. female.  Referred by Dr. 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    3/1/2021-bilateral screening mammogram-microcalcifications seen in the posterior one third of the lateral aspect of the right breast.  Area of focal asymmetry seen in the middle one third of the upper inner quadrant of the left breast.    3/1/2021-DEXA scan-T score of -2.2 on the lumbar spine and T score of -2.3 in the left hip and T score of -1.9 in the right hip.  Findings consistent with osteopenia    3/23/2021-screening lung CT-there is a 10 x 11 mm solid nodule in the left upper lobe.  Enlarged AP window lymph node measuring 14 x 10 mm.  Suggestion of a possible 9 mm left hilar lymph node.  Heavy coronary artery calcification.    3/23/2021-diagnostic mammogram bilateral-cluster of microcalcifications in the middle third lateral aspect of the right breast.  Left breast demonstrates persistence of the area of focal asymmetry in the region.    Ultrasound-left breast ultrasound at 10 o'clock position, 6 cm from the nipple there is a 0.4 cm irregular hypoechoic lesion.  Stereotactic biopsy of the right breast calcifications recommended.  Ultrasound-guided biopsy of the left breast lesion recommended    4/7/2021-right breast stereotactic biopsy and left breast ultrasound-guided biopsy  Pathology  Right breast-invasive ductal carcinoma, grade 2, lymphovascular invasion present, ER +99% strong, AK +80% moderate, HER-2 negative, Ki-67 12%  Left breast upper inner quadrant 10 o'clock position biopsy, invasive ductal carcinoma, grade   2, ER +99% strong, AK +40% strong, HER-2 2+ on immunohistochemistry, nonamplified on FISH Ki-67 10%    4/14/2021-PET/CT-FDG avid aortopulmonary window lymph node measures 0.9 cm with an SUV of 7.5.  FDG avid left hilar lymph node  measures 1 cm with an SUV of 17.2.  Irregular soft tissue density in the right breast measuring 3.7 x 3.8 cm is favored to be secondary to the recent breast biopsy.  1.1 cm pulmonary nodule within the left upper lobe with SUV 9.7 .  Sub-6 mm pulmonary nodules are present in the right lower lobe.  No evidence of lymphadenopathy or metastatic disease in the abdomen.  Focal area of FDG uptake within the central canal posterior to T11-T12 displaced demonstrating an SUV of 5.5 thought to be reactive however MRI is recommended for further evaluation.    She was seen by Dr. Molina who initially planned for breast surgery however  due to the PET abnormalities she has been referred to Dr. Kerr who plans to do a wound on 4/30/2021.  Dr. Molina's and Dr. Kerr's notes reviewed.    Patient denies any family history of breast cancer.  Her mother had lymphoma.  Maternal grandmother had colon cancer.  Prior to that screening mammogram she did not have any palpable abnormalities of either breast.    She has been a heavy smoker for about 40 years and smoked 2 packs/day.  Denies any recent weight changes, new bone pains, cough, abdominal pain nausea vomiting constipation or diarrhea.  She does have anxiety and has been on several medications.  She has recently been started on Xanax to help with the mood and also with insomnia.    Patient had a video-assisted thoracoscopy and mediastinal lymphadenectomy on 4/30/2021.  L5-L6 lymph nodes were biopsied however the small pulmonary nodule in the left upper lobe was not difficult to find.  Pathology showed moderately differentiated adenocarcinoma which is CK7 and TTF-1 positive.  Consistent with lung primary.  Ki-67 85%.    5/14/2021-MRI of the brain-minimal chronic small vessel ischemic change in the white matter.  Otherwise negative MRI.    5/20/2021-bilateral axillary ultrasound-no evidence of axillary lymphadenopathy in either axilla.  Normal-appearing bilateral axillary lymph  nodes visualized.    Cycle 1 cisplatin and Alimta on 5/25/2021.    Cycle 2 cisplatin Alimta 6/15/2021    Cycle 3 cisplatin Alimta 7/7/2021    Completed radiation on 7/12/2021    Port was nonfunctional hence a port study was performed which showed that the port was backed up into the innominate vein and also there was a nonocclusive thrombus at the end of the catheter extending into the superior vena cava.  She was started on anticoagulation with Eliquis.  It was recommended for port revision of the intention is to keep the port.    PET/CT 8/5/2021-images independently reviewed and interpreted by me-decrease in size and FDG uptake of the left upper lobe pulmonary nodule as well as mediastinal and left hilar lymphadenopathy representing response to treatment.  Sub-6 mm pulmonary nodule in the left upper lobe new since 4/14/2021.  This could be related to radiation however follow-up CT in 3 months recommended.  Decrease in size of the irregular masslike tissue in the right breast which is postbiopsy hematoma.  This is not PET avid.  New segmental left eighth rib fractures healing.  A new band of sclerosis of the left seventh rib which favored to represent healing nondisplaced fracture.  Follow-up CT recommended.  focal uptake in the duodenum first and second portions.  Could be related to duodenitis.  Indeterminate lesion in the L1 vertebral body not well evaluated on PET/CT.    10/1/2021-MRI of the lumbar spine.  Lesion in the left posterior body of L1 measures 14 x 14 x 12 mm and previously 5 x 5 x 6 mm.  The interval enlargement strongly suggest that it is metastatic lesion.  No additional osseous metastasis noted.   Other chronic changes noted.    10/14/2021-biopsy of the lumbar spine lesion-pathology consistent with poorly differentiated carcinoma.  The staining is similar to the prior tumors and fevers this being metastatic poorly differentiated pulmonary adenocarcinoma.    10/26/2021-brain MRI-no evidence of  metastatic disease.    10/26/2021-bilateral diagnostic mammogram and ultrasound  Scattered fibroglandular density in both breast.  Postbiopsy hematoma with internal biopsy clip in the upper outer quadrant of the right breast, 8 cm from the nipple.  The hematoma size is decreased to 2.9 cm from 3.6 cm earlier.    Left breast-parenchymal density measuring 9 x 10 mm has markedly decreased.  Few adjacent calcifications which are unchanged.  No new abnormality in the left breast.  At 10:00 region there is some minimal adjacent hypoechoic texture which is difficult to measure but appears smaller since the previous exam.    10/28/2021-PET/CT  New L1 and L2 lytic bone metastasis annually intensely hypermetabolic focus at the left adrenal gland likely represents metastatic disease as well.  Slight decrease in size of the 0.9 x 0.7 cm left upper lobe pulmonary nodule.  There is hypermetabolic activity related to radiation pneumonitis.  The remainder of the nodule is photopenic.  Uncertain etiology of the more intense activity along the right lateral peripheral margin of the 2.6 cm postbiopsy density of the right breast.    She completed radiation to to the lumbar spine on 11/19/2021 11/22/2021-cycle 1 Alimta and Keytruda    Interval history  Ms. Patel presents to the clinic today for follow-up.    She was experiencing some pain in the lumbar spine with minimal activity hence Norco dose was readjusted by Dr. Thomson.  Continues to experience her pain.  Has been compliant with letrozole  No other complaints at this time  She is scheduled for port revision next Monday.  Here to start cycle 1 of Alimta and Keytruda.  Anxiety still not well controlled.    The following portions of the patient's history were reviewed and updated as appropriate: allergies, current medications, past family history, past medical history, past social history, past surgical history and problem list.    Past Medical History:   Diagnosis Date   •  Anxiety    • Dyspnea on exertion    • SHAYY (generalized anxiety disorder)     RELATED HIGH BLOOD PRESSURE   • GERD (gastroesophageal reflux disease)    • High blood pressure     ANXIETY RELATED   • Hyperlipidemia    • Hypothyroidism    • Insomnia    • Lung cancer (HCC)    • Lung nodule    • Malignant neoplasm of upper-outer quadrant of right breast in female, estrogen receptor positive (HCC) 4/13/2021   • Migraine    • PONV (postoperative nausea and vomiting)         Past Surgical History:   Procedure Laterality Date   • BREAST BIOPSY Bilateral 04/07/2021    Right Breast 10 o'clock & Left breast 10 o'clock 6 cm from nipple, [facility]   • CLOSED REDUCTION HIP DISLOCATION Left 4/30/2021    Procedure: BRONCHOSCOPY, LEFT VIDEO ASSITED THORACOSCOPY, ROBOTIC ASSSITED MEDIASTINAL LYMPHADENECTOMY WITH INTERCOSTAL NERVE BLOCKS;  Surgeon: Raphael Kerr III, MD;  Location: [facility];  Service: DaVinci;  Laterality: Left;   • COLONOSCOPY     • TUBAL ABDOMINAL LIGATION     • VENOUS ACCESS DEVICE (PORT) INSERTION Right 5/20/2021    Procedure: INSERTION VENOUS ACCESS DEVICE;  Surgeon: Raphael Kerr III, MD;  Location: [facility];  Service: Thoracic;  Laterality: Right;   • WRIST SURGERY Right         Family History   Problem Relation Age of Onset   • Cancer Father         LYMPHATIC   • Prostate cancer Father    • Lymphoma Father    • Alcohol abuse Brother    • Colon cancer Maternal Grandmother    • Malig Hyperthermia Neg Hx         Social History     Socioeconomic History   • Marital status:      Spouse name: Jelani   Tobacco Use   • Smoking status: Former Smoker     Packs/day: 1.00     Types: Electronic Cigarette, Cigarettes     Start date: 4/14/1981   • Smokeless tobacco: Never Used   • Tobacco comment: ABT 15 CIGARETTES DAILY   Vaping Use   • Vaping Use: Some days   • Substances: Nicotine, Flavoring   • Devices: Disposable   Substance and Sexual Activity   • Alcohol use: Never   • Drug use: Never   • Sexual  activity: Yes     Partners: Male        OB History        2    Para   2    Term   2            AB        Living           SAB        IAB        Ectopic        Molar        Multiple        Live Births                Age of menarche-10  Age at menopause-42  Oral contraceptive pill use-15 years  No HRT use  She has 2 children  Age at first live childbirth-17    No Known Allergies     Review of Systems   Review of systems as mentioned in the HPI    Objective   not currently breastfeeding.       Physical Exam  Vitals reviewed.   HENT:      Head: Normocephalic.   Eyes:      Extraocular Movements: Extraocular movements intact.      Conjunctiva/sclera: Conjunctivae normal.      Pupils: Pupils are equal, round, and reactive to light.   Cardiovascular:      Rate and Rhythm: Normal rate and regular rhythm.      Pulses: Normal pulses.      Heart sounds: Normal heart sounds.   Pulmonary:      Effort: Pulmonary effort is normal.      Breath sounds: Normal breath sounds.   Abdominal:      General: Abdomen is flat. Bowel sounds are normal. There is no distension.      Palpations: There is no mass.   Musculoskeletal:         General: No swelling. Normal range of motion.      Cervical back: Normal range of motion.   Skin:     General: Skin is warm.   Neurological:      General: No focal deficit present.      Mental Status: She is alert and oriented to person, place, and time.   Psychiatric:         Mood and Affect: Mood normal.         Behavior: Behavior normal.     Breast Exam: Right breast with a large postbiopsy hematoma in the upper outer quadrant measuring 4 cm.    No other palpable abnormalities.  Left breast with no palpable abnormalities.  No right or left axillary lymphadenopathy(breast not examined today)    I have reexamined the patient and the results are consistent with the previously documented exam. Salma Snell MD     Infusion on 2021   Component Date Value Ref Range Status   • WBC 2021  5.70  3.40 - 10.80 10*3/mm3 Final   • RBC 11/22/2021 4.12  3.77 - 5.28 10*6/mm3 Final   • Hemoglobin 11/22/2021 11.0* 12.0 - 15.9 g/dL Final   • Hematocrit 11/22/2021 37.0  34.0 - 46.6 % Final   • MCV 11/22/2021 89.8  79.0 - 97.0 fL Final   • MCH 11/22/2021 26.7  26.6 - 33.0 pg Final   • MCHC 11/22/2021 29.7* 31.5 - 35.7 g/dL Final   • RDW 11/22/2021 13.4  12.3 - 15.4 % Final   • RDW-SD 11/22/2021 44.6  37.0 - 54.0 fl Final   • MPV 11/22/2021 9.2  6.0 - 12.0 fL Final   • Platelets 11/22/2021 291  140 - 450 10*3/mm3 Final   • Neutrophil % 11/22/2021 67.6  42.7 - 76.0 % Final   • Lymphocyte % 11/22/2021 13.7* 19.6 - 45.3 % Final   • Monocyte % 11/22/2021 10.9  5.0 - 12.0 % Final   • Eosinophil % 11/22/2021 6.0  0.3 - 6.2 % Final   • Basophil % 11/22/2021 1.1  0.0 - 1.5 % Final   • Immature Grans % 11/22/2021 0.7* 0.0 - 0.5 % Final   • Neutrophils, Absolute 11/22/2021 3.86  1.70 - 7.00 10*3/mm3 Final   • Lymphocytes, Absolute 11/22/2021 0.78  0.70 - 3.10 10*3/mm3 Final   • Monocytes, Absolute 11/22/2021 0.62  0.10 - 0.90 10*3/mm3 Final   • Eosinophils, Absolute 11/22/2021 0.34  0.00 - 0.40 10*3/mm3 Final   • Basophils, Absolute 11/22/2021 0.06  0.00 - 0.20 10*3/mm3 Final   • Immature Grans, Absolute 11/22/2021 0.04  0.00 - 0.05 10*3/mm3 Final   • nRBC 11/22/2021 0.0  0.0 - 0.2 /100 WBC Final   Lab on 10/29/2021   Component Date Value Ref Range Status   • Reference Lab Report 10/29/2021 [facility]   Final   Hospital Outpatient Visit on 10/28/2021   Component Date Value Ref Range Status   • Glucose 10/28/2021 101  70 - 130 mg/dL Final    Meter: [id number] : [id number] [facility]   Hospital Outpatient Visit on 10/26/2021   Component Date Value Ref Range Status   • Target HR (85%) 10/26/2021 138  bpm Final   • Max. Pred. HR (100%) 10/26/2021 162  bpm Final   • Right Internal Jugular Augment 10/26/2021 Y   Final   • Right Internal Jugular Competent 10/26/2021 Y   Final   • Right Internal Jugular Compress  10/26/2021 C   Final   • Right Internal Jugular Phasic 10/26/2021 Y   Final   • Right Internal Jugular Spont 10/26/2021 Y   Final   • Left Internal Jugular Augment 10/26/2021 Y   Final   • Left Internal Jugular Competent 10/26/2021 Y   Final   • Left Internal Jugular Compress 10/26/2021 C   Final   • Left Internal Jugular Phasic 10/26/2021 Y   Final   • Left Internal Jugular Spont 10/26/2021 Y   Final   • Right Subclavian Augment 10/26/2021 Y   Final   • Right Subclavian Competent 10/26/2021 Y   Final   • Right Subclavian Phasic 10/26/2021 Y   Final   • Right Subclavian Spont 10/26/2021 Y   Final   • Left Subclavian Augment 10/26/2021 Y   Final   • Left Subclavian Competent 10/26/2021 Y   Final   • Left Subclavian Phasic 10/26/2021 Y   Final   • Left Subclavian Spont 10/26/2021 Y   Final   • Right Axillary Augment 10/26/2021 Y   Final   • Right Axillary Competent 10/26/2021 Y   Final   • Right Axillary Compress 10/26/2021 C   Final   • Right Axillary Phasic 10/26/2021 Y   Final   • Right Axillary Spont 10/26/2021 Y   Final   • Right Brachial Compress 10/26/2021 C   Final   • Right Radial Compress 10/26/2021 C   Final   • Right Ulnar Compress 10/26/2021 C   Final   • Right Basilic Upper Compress 10/26/2021 C   Final   • Right Basilic Forearm Compress 10/26/2021 C   Final   • Right Cephalic Upper Compress 10/26/2021 C   Final   • Right Cephalic Forearm Compress 10/26/2021 C   Final   • Left Subclavian Compress 10/26/2021 C   Final        MRI Brain With & Without Contrast    Result Date: 10/26/2021  Evidence of minimal small vessel chronic ischemic change as described. Tiny late acute to subacute phase is widely infarct in the right posterior frontal region as described. Otherwise unremarkable MRI of the brain. There is no definite evidence of intracranial metastatic disease.  This report was finalized on 10/26/2021 11:21 AM by Dr. Lucio Jimenez M.D.      NM PET/CT Skull Base to Mid Thigh    Result Date:  10/29/2021  1. There are new L1 and L2 lytic bone metastases and the new intensely hypermetabolic focus at the left adrenal gland likely represents a metastasis as well. 2. Slight decrease in size of the 0.9 x 0.7 cm left upper lobe pulmonary nodule. There is hypermetabolic activity related to radiation pneumonitis, but the remaining nodule is photopenic. 3. Uncertain etiology of the more intense activity along the right lateral peripheral margin of the remaining 2.6 cm post biopsy density at the right breast.  This report was finalized on 10/29/2021 4:18 PM by Dr. Chaya Lee M.D.       CBC 8/25/2021-WBC 7.76, hemoglobin 10.8, platelets 255    8/24/2021 bilateral diagnostic mammogram and bilateral ultrasound-images independently reviewed and interpreted by me  In the left breast there is a 1 cm focal asymmetry.  Previously measured 0.8 cm on 3/1/2021.  1.4 cm group of calcifications adjacent to the superior lateral margin of the biopsy clip which are new.    In the right breast there is a 3.5 cm postbiopsy hematoma.  This has decreased in size.  Loosely grouped residual calcifications measuring up to 2.5 cm.  These have decreased in number compared to 3/23/2021.  However the extent measures slightly larger could be due to superimposed hematoma.    Ultrasound  At 10 o'clock position of the left breast, 6 cm from the nipple there is an echogenic focus from the biopsy clip and the malignancy measures 0.4 x 0.3 cm.    10/6/2021-WBC 9.54, hemoglobin 11, platelets 289,000  CMP-BUN 14, creatinine 0.98, LFTs normal    10/1/2021-MRI of the lumbar spine 10/1/2021-images independently reviewed and interpreted by me.  There has been an interval enlargement in the rounded T1 hypointense, T2 hyperintense lesion in the left posterior body of L1.  Previously measured 6 x 5 x 6 mm and now measures 14 x 14 x 12 mm.  This is highly concerning for osseous metastasis.  No other evidence of bony metastasis noted.  Other chronic  changes.      10/22/2020  CBC-WBC 11.86, hemoglobin 10.2, platelets 409,000  CMP-BUN 14, creatinine 1, potassium 5     Bilateral diagnostic mammogram, ultrasound, PET/CT, MRI of the brain images independently reviewed interpreted by me in detail summarized in HPI      Assessment/Plan      *Bilateral breast cancer  · Invasive ductal carcinoma in both right and left breast and lesions measure under 1 cm.  No palpable lymphadenopathy although unsure if this has been evaluated by an axillary ultrasound.  No abnormal axillary lymphadenopathy noted on PET.  · Most likely clinical T1b N0 M0, both cancers were noted to be grade 2, ER/NE positive and HER-2 negative and Ki-67 less than 15%  VATS on 4/30/2021  Biopsy confirms adenocarcinoma of pulmonary primary  Discussed with Dr. Molina about delaying breast surgery and she is in agreement  Darted on neoadjuvant anastrozole  · Patient experienced significant adverse effects with anastrozole  · Treatment changed to letrozole   · Axillary ultrasound 4/20/2021 -negative for any axillary lymphadenopathy  · Started letrozole in May 2021  · She continues on letrozole without any problems.  · 8/24/2021 bilateral diagnostic mammogram and ultrasound.  In the left breast the tumor now measures 1 cm on mammogram as opposed to 0.8 cm in March.  Right breast calcifications also noted to be in a large extent although the number seems to be decreased.  Hematoma has decreased in size.  · 10/26/2021-bilateral diagnostic mammogram and ultrasound-decrease in size of the bilateral breast lesions.  · 10/26/2021-PET/CT-metastatic lesions in the left adrenal gland, L1-L2.  · 10/26/2021-MRI of the brain-no evidence of metastatic disease  · Since the breast lesions are decreasing in size, we will continue with letrozole.  · We previously discussed changing treatment to exemestane however since the most recent mammogram and ultrasound shows decrease in the breast lesions I think it is reasonable to  proceed with letrozole.  · We will hold off on lumpectomy at this time to see what the course of lung cancer would be.  · She continues on letrozole without any problems.      *Adenocarcinoma of the left lung  · Most likely stage T1 N2 M0, stage III  · MRI of the thoracic spine ordered to further evaluate the abnormality seen on PET  · MRI of the brain negative   · Given that she is only 55 and fairly good performance status I discussed with her by with concurrent chemoradiation with cisplatin and Alimta every 3 weeks followed by durvalumab.  · Adverse effects including but not limited to myelosuppression, increased risk of infections, nausea, vomiting, renal dysfunction, ototoxicity, neurotoxicity have been discussed at length.  · MRI of the thoracic spine performed 5/24/2021 shows no evidence of metastatic disease in the thoracic spine however in L1 there is a 7 mm lesion which is indeterminate.  A lumbar spine MRI has been recommended.  · Discussed her case with Dr. Oates and he feels like MRI of the lumbar spine may also show that this lesion is indeterminate.  · Discussed the same with the patient and her .  · Even if this is metastatic disease this might be the only lesion of metastatic disease and then would consider this oligometastatic disease.  · Therefore plan to proceed with concurrent chemoradiation as scheduled.  · cisplatin and Alimta, C1D1 5/25/2021  · Day 1 of radiation 5/25/2021  · 5/27/2021-MRI of the lumbar spine-there is a 6 x 5 x 6 mm enhancing lesion in the left posterior body of L1 vertebra.  This is considered indeterminate.  Follow-up recommended.  Degenerative changes noted at L5-S1  · 7/7/2021, cycle 3 cisplatin Alimta  · 7/12/2021-radiation completed  · PET/CT 8/6/2021 with good response to chemoradiation.  · MRI of the lumbar spine 10/1/2021 with increase in size of the L1 lesion which now measures 14 mm as opposed to 6 mm in May 2021.  · 10/14/2021-biopsy of the L1 lesion and  pathology consistent with adenocarcinoma of the lung, TPS 0  · 0/26/2021-PET/CT-metastatic lesions in the left adrenal gland, L1-L2.  · 10/26/2021-MRI of the brain-no evidence of metastatic disease  · Given that she only has metastatic disease in L1-L2 and left adrenal gland I think it would be possible to radiate all these regions.  · Completed radiation to L1-L2 on 11/19/2021  · 11/22/2021-cycle 1 of Keytruda and Alimta    *Bilateral breast cancer-no family history of breast cancer but given synchronous breast cancer genetic testing has been sent.  Genetic testing with a variant of unknown significance.    *Right hand numbness and difficulty with grasping objects  · Previously had history of carpal tunnel requiring repair.  · She is currently on letrozole which could cause worsening of the carpal tunnel  · Gabapentin dose will be increased to 600 mg 3 times daily  · She will also be referred to hand surgery for further evaluation.  · She now reports bilateral upper extremity numbness.  · Continue gabapentin  · MRI of the brain 10/26/2021 without any evidence of metastatic disease    *Anxiety-  · Currently on Xanax 1 mg nightly  · She required Valium for the MRI as she had claustrophobia and a panic attack  · Remains anxious secondary to recent progression of metastatic lung cancer  · Continues to struggle with anxiety  · Her visit with supportive oncology was canceled  · Reschedule that appointment    *Left-sided chest wall pain-secondary to VATS.  Most likely neuropathic.  Continue gabapentin and Norco as needed.  She ran out of gabapentin and experienced leg cramps.    Continue gabapentin      *Hypertension-continue lisinopril.  Blood pressure 101/70    *Smoking-not addressed today      *GERD  · 7/7/2021, patient complains of reflux despite the use of Prilosec 20 mg twice daily.  We will send her a prescription for Carafate slurry 4 times daily.  · She has not quite started using the Carafate.  · Continue  Prilosec and recommend using Carafate  · PET/CT shows duodenitis      *Port not returning blood  · Patient reports the last treatment that difficulty getting blood return.  Today nursing was unable to get blood return and have placed Activase without success.  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  The end of the catheter has backed up into the innominate vein.  This was discussed with Dr. Snell and there is no need for additional imaging at this time.  WE WILL NOT USE THE PORT.   · She has been started on Eliquis.  Completed a week of 10 mg twice daily and now on 5 mg.  Continue the same  · We will obtain imaging to see if the disease is responded.  If patient wishes to get the port out then we will continue with anticoagulation for 3 months and remove the port.  However if she wishes to keep the port then a port revision will have to be performed.    · Continue Eliquis and remove port after 3 months  · Continue Eliquis at this time  · Innominate vein could not be assessed on the Doppler.  · We would need a new port since she has now been diagnosed with metastatic lung cancer and will need to be on chemotherapy indefinitely.  · Port change scheduled for 11/22/2021    PLAN:  1. Continue Eliquis for now and discontinue after port exchange  2. Continue letrozole 2.5 mg daily  3. Continue Carafate 4 times daily.   4. Continue PPI  5. Start palliative chemotherapy with Keytruda and Alimta every 3 weeks, first dose 11/22/2021  6. B12 injection   7. Follow-up in 3 weeks with APRN in 6 weeks with myself    Patient is on treatment requiring close monitoring for toxicities    Salma Snell MD

## 2021-11-26 ENCOUNTER — PRE-ADMISSION TESTING (OUTPATIENT)
Dept: PREADMISSION TESTING | Facility: HOSPITAL | Age: 56
End: 2021-11-26

## 2021-11-26 VITALS
BODY MASS INDEX: 32.43 KG/M2 | SYSTOLIC BLOOD PRESSURE: 103 MMHG | HEART RATE: 94 BPM | RESPIRATION RATE: 20 BRPM | WEIGHT: 183 LBS | TEMPERATURE: 97.8 F | DIASTOLIC BLOOD PRESSURE: 73 MMHG | OXYGEN SATURATION: 94 % | HEIGHT: 63 IN

## 2021-11-26 DIAGNOSIS — C34.92 ADENOCARCINOMA OF LEFT LUNG (HCC): ICD-10-CM

## 2021-11-26 DIAGNOSIS — Z45.2 ENCOUNTER FOR FITTING AND ADJUSTMENT OF VASCULAR CATHETER: ICD-10-CM

## 2021-11-26 LAB
ANION GAP SERPL CALCULATED.3IONS-SCNC: 10.7 MMOL/L (ref 5–15)
BASOPHILS # BLD AUTO: 0.03 10*3/MM3 (ref 0–0.2)
BASOPHILS NFR BLD AUTO: 0.4 % (ref 0–1.5)
BUN SERPL-MCNC: 13 MG/DL (ref 6–20)
BUN/CREAT SERPL: 12.6 (ref 7–25)
CALCIUM SPEC-SCNC: 9.2 MG/DL (ref 8.6–10.5)
CHLORIDE SERPL-SCNC: 102 MMOL/L (ref 98–107)
CO2 SERPL-SCNC: 23.3 MMOL/L (ref 22–29)
CREAT SERPL-MCNC: 1.03 MG/DL (ref 0.57–1)
DEPRECATED RDW RBC AUTO: 38.2 FL (ref 37–54)
EOSINOPHIL # BLD AUTO: 0.35 10*3/MM3 (ref 0–0.4)
EOSINOPHIL NFR BLD AUTO: 4.5 % (ref 0.3–6.2)
ERYTHROCYTE [DISTWIDTH] IN BLOOD BY AUTOMATED COUNT: 12.6 % (ref 12.3–15.4)
GFR SERPL CREATININE-BSD FRML MDRD: 55 ML/MIN/1.73
GLUCOSE SERPL-MCNC: 106 MG/DL (ref 65–99)
HCT VFR BLD AUTO: 30.9 % (ref 34–46.6)
HGB BLD-MCNC: 10 G/DL (ref 12–15.9)
IMM GRANULOCYTES # BLD AUTO: 0.03 10*3/MM3 (ref 0–0.05)
IMM GRANULOCYTES NFR BLD AUTO: 0.4 % (ref 0–0.5)
INR PPP: 1.01 (ref 0.9–1.1)
LYMPHOCYTES # BLD AUTO: 0.42 10*3/MM3 (ref 0.7–3.1)
LYMPHOCYTES NFR BLD AUTO: 5.4 % (ref 19.6–45.3)
MCH RBC QN AUTO: 27.1 PG (ref 26.6–33)
MCHC RBC AUTO-ENTMCNC: 32.4 G/DL (ref 31.5–35.7)
MCV RBC AUTO: 83.7 FL (ref 79–97)
MONOCYTES # BLD AUTO: 0.21 10*3/MM3 (ref 0.1–0.9)
MONOCYTES NFR BLD AUTO: 2.7 % (ref 5–12)
NEUTROPHILS NFR BLD AUTO: 6.69 10*3/MM3 (ref 1.7–7)
NEUTROPHILS NFR BLD AUTO: 86.6 % (ref 42.7–76)
NRBC BLD AUTO-RTO: 0 /100 WBC (ref 0–0.2)
PLATELET # BLD AUTO: 241 10*3/MM3 (ref 140–450)
PMV BLD AUTO: 9.3 FL (ref 6–12)
POTASSIUM SERPL-SCNC: 4.5 MMOL/L (ref 3.5–5.2)
PROTHROMBIN TIME: 13.1 SECONDS (ref 11.7–14.2)
QT INTERVAL: 388 MS
RBC # BLD AUTO: 3.69 10*6/MM3 (ref 3.77–5.28)
SARS-COV-2 ORF1AB RESP QL NAA+PROBE: NOT DETECTED
SODIUM SERPL-SCNC: 136 MMOL/L (ref 136–145)
WBC NRBC COR # BLD: 7.73 10*3/MM3 (ref 3.4–10.8)

## 2021-11-26 PROCEDURE — 36415 COLL VENOUS BLD VENIPUNCTURE: CPT

## 2021-11-26 PROCEDURE — 85610 PROTHROMBIN TIME: CPT

## 2021-11-26 PROCEDURE — 85025 COMPLETE CBC W/AUTO DIFF WBC: CPT

## 2021-11-26 PROCEDURE — 93005 ELECTROCARDIOGRAM TRACING: CPT

## 2021-11-26 PROCEDURE — C9803 HOPD COVID-19 SPEC COLLECT: HCPCS

## 2021-11-26 PROCEDURE — U0004 COV-19 TEST NON-CDC HGH THRU: HCPCS

## 2021-11-26 PROCEDURE — 93010 ELECTROCARDIOGRAM REPORT: CPT | Performed by: INTERNAL MEDICINE

## 2021-11-26 PROCEDURE — 80048 BASIC METABOLIC PNL TOTAL CA: CPT

## 2021-11-26 RX ORDER — GABAPENTIN 300 MG/1
600 CAPSULE ORAL NIGHTLY
COMMUNITY
End: 2022-02-14 | Stop reason: SDUPTHER

## 2021-11-26 NOTE — DISCHARGE INSTRUCTIONS
.Take the following medications the morning of surgery:  PROZAC, GABAPENTIN, OMPEPRAZOLE, LEVOTHYROXINE    ARRIVE TO MAIN OR DESK 11-29-21 AT 5:30AM    If you are on prescription narcotic pain medication to control your pain you may also take that medication the morning of surgery.    General Instructions:  • Do not eat solid food after midnight the night before surgery.  • You may drink clear liquids day of surgery but must stop at least one hour before your hospital arrival time.  • It is beneficial for you to have a clear drink that contains carbohydrates the day of surgery.  We suggest a 12 to 20 ounce bottle of Gatorade or Powerade for non-diabetic patients or a 12 to 20 ounce bottle of G2 or Powerade Zero for diabetic patients. (Pediatric patients, are not advised to drink a 12 to 20 ounce carbohydrate drink)    Clear liquids are liquids you can see through.  Nothing red in color.     Plain water                               Sports drinks  Sodas                                   Gelatin (Jell-O)  Fruit juices without pulp such as white grape juice and apple juice  Popsicles that contain no fruit or yogurt  Tea or coffee (no cream or milk added)  Gatorade / Powerade  G2 / Powerade Zero    • Infants may have breast milk up to four hours before surgery.  • Infants drinking formula may drink formula up to six hours before surgery.   • Patients who avoid smoking, chewing tobacco and alcohol for 4 weeks prior to surgery have a reduced risk of post-operative complications.  Quit smoking as many days before surgery as you can.  • Do not smoke, use chewing tobacco or drink alcohol the day of surgery.   • If applicable bring your C-PAP/ BI-PAP machine.  • Bring any papers given to you in the doctor’s office.  • Wear clean comfortable clothes.  • Do not wear contact lenses, false eyelashes or make-up.  Bring a case for your glasses.   • Bring crutches or walker if applicable.  • Remove all piercings.  Leave jewelry and  any other valuables at home.  • Hair extensions with metal clips must be removed prior to surgery.  • The Pre-Admission Testing nurse will instruct you to bring medications if unable to obtain an accurate list in Pre-Admission Testing.        Preventing a Surgical Site Infection:  • For 2 to 3 days before surgery, avoid shaving with a razor because the razor can irritate skin and make it easier to develop an infection.    • Any areas of open skin can increase the risk of a post-operative wound infection by allowing bacteria to enter and travel throughout the body.  Notify your surgeon if you have any skin wounds / rashes even if it is not near the expected surgical site.  The area will need assessed to determine if surgery should be delayed until it is healed.  • The night prior to surgery shower using a fresh bar of anti-bacterial soap (such as Dial) and clean washcloth.  Sleep in a clean bed with clean clothing.  Do not allow pets to sleep with you.  • Shower on the morning of surgery using a fresh bar of anti-bacterial soap (such as Dial) and clean washcloth.  Dry with a clean towel and dress in clean clothing.  • Ask your surgeon if you will be receiving antibiotics prior to surgery.  • Make sure you, your family, and all healthcare providers clean their hands with soap and water or an alcohol based hand  before caring for you or your wound.    Day of surgery:  Your arrival time is approximately two hours before your scheduled surgery time.  Upon arrival, a Pre-op nurse and Anesthesiologist will review your health history, obtain vital signs, and answer questions you may have.  The only belongings needed at this time will be a list of your home medications and if applicable your C-PAP/BI-PAP machine.  A Pre-op nurse will start an IV and you may receive medication in preparation for surgery, including something to help you relax.     Please be aware that surgery does come with discomfort.  We want to  make every effort to control your discomfort so please discuss any uncontrolled symptoms with your nurse.   Your doctor will most likely have prescribed pain medications.      If you are going home after surgery you will receive individualized written care instructions before being discharged.  A responsible adult must drive you to and from the hospital on the day of your surgery and stay with you for 24 hours.  Discharge prescriptions can be filled by the hospital pharmacy during regular pharmacy hours.  If you are having surgery late in the day/evening your prescription may be e-prescribed to your pharmacy.  Please verify your pharmacy hours or chose a 24 hour pharmacy to avoid not having access to your prescription because your pharmacy has closed for the day.    If you are staying overnight following surgery, you will be transported to your hospital room following the recovery period.  [facility] has all private rooms.    If you have any questions please call Pre-Admission Testing at [phone].  Deductibles and co-payments are collected on the day of service. Please be prepared to pay the required co-pay, deductible or deposit on the day of service as defined by your plan.    Patient Education for Self-Quarantine Process    • Following your COVID testing, we strongly recommend that you wear a mask when you are with other people and practice social distancing.   • Limit your activities to only required outings.  • Wash your hands with soap and water frequently for at least 20 seconds.   • Avoid touching your eyes, nose and mouth with unwashed hands.  • Do not share anything - utensils, drinking glasses, food from the same bowl.   • Sanitize household surfaces daily. Include all high touch areas (door handles, light switches, phones, countertops, etc.)    Call your surgeon immediately if you experience any of the following symptoms:  • Sore Throat  • Shortness of Breath or difficulty  breathing  • Cough  • Chills  • Body soreness or muscle pain  • Headache  • Fever  • New loss of taste or smell  • Do not arrive for your surgery ill.  Your procedure will need to be rescheduled to another time.  You will need to call your physician before the day of surgery to avoid any unnecessary exposure to hospital staff as well as other patients.      CHLORHEXIDINE CLOTH INSTRUCTIONS  The morning of surgery follow these instructions using the Chlorhexidine cloths you've been given.  These steps reduce bacteria on the body.  Do not use the cloths near your eyes, ears mouth, genitalia or on open wounds.  Throw the cloths away after use but do not try to flush them down a toilet.      • Open and remove one cloth at a time from the package.    • Leave the cloth unfolded and begin the bathing.  • Massage the skin with the cloths using gentle pressure to remove bacteria.  Do not scrub harshly.   • Follow the steps below with one 2% CHG cloth per area (6 total cloths).  • One cloth for neck, shoulders and chest.  • One cloth for both arms, hands, fingers and underarms (do underarms last).  • One cloth for the abdomen followed by groin.  • One cloth for right leg and foot including between the toes.  • One cloth for left leg and foot including between the toes.  • The last cloth is to be used for the back of the neck, back and buttocks.    Allow the CHG to air dry 3 minutes on the skin which will give it time to work and decrease the chance of irritation.  The skin may feel sticky until it is dry.  Do not rinse with water or any other liquid or you will lose the beneficial effects of the CHG.  If mild skin irritation occurs, do rinse the skin to remove the CHG.  Report this to the nurse at time of admission.  Do not apply lotions, creams, ointments, deodorants or perfumes after using the clothes. Dress in clean clothes before coming to the hospital.

## 2021-11-29 ENCOUNTER — APPOINTMENT (OUTPATIENT)
Dept: GENERAL RADIOLOGY | Facility: HOSPITAL | Age: 56
End: 2021-11-29

## 2021-11-29 ENCOUNTER — ANESTHESIA EVENT (OUTPATIENT)
Dept: PERIOP | Facility: HOSPITAL | Age: 56
End: 2021-11-29

## 2021-11-29 ENCOUNTER — ANESTHESIA (OUTPATIENT)
Dept: PERIOP | Facility: HOSPITAL | Age: 56
End: 2021-11-29

## 2021-11-29 ENCOUNTER — HOSPITAL ENCOUNTER (OUTPATIENT)
Facility: HOSPITAL | Age: 56
Setting detail: HOSPITAL OUTPATIENT SURGERY
Discharge: HOME | End: 2021-11-29
Attending: THORACIC SURGERY (CARDIOTHORACIC VASCULAR SURGERY) | Admitting: THORACIC SURGERY (CARDIOTHORACIC VASCULAR SURGERY)

## 2021-11-29 VITALS
OXYGEN SATURATION: 92 % | RESPIRATION RATE: 16 BRPM | DIASTOLIC BLOOD PRESSURE: 66 MMHG | BODY MASS INDEX: 32.12 KG/M2 | WEIGHT: 181.3 LBS | HEART RATE: 95 BPM | TEMPERATURE: 97.6 F | SYSTOLIC BLOOD PRESSURE: 94 MMHG | HEIGHT: 63 IN

## 2021-11-29 DIAGNOSIS — C34.92 ADENOCARCINOMA OF LEFT LUNG (HCC): ICD-10-CM

## 2021-11-29 DIAGNOSIS — Z45.2 ENCOUNTER FOR FITTING AND ADJUSTMENT OF VASCULAR CATHETER: ICD-10-CM

## 2021-11-29 PROCEDURE — 25010000002 HEPARIN (PORCINE) PER 1000 UNITS: Performed by: THORACIC SURGERY (CARDIOTHORACIC VASCULAR SURGERY)

## 2021-11-29 PROCEDURE — 25010000002 MIDAZOLAM PER 1 MG: Performed by: ANESTHESIOLOGY

## 2021-11-29 PROCEDURE — C1788 PORT, INDWELLING, IMP: HCPCS | Performed by: THORACIC SURGERY (CARDIOTHORACIC VASCULAR SURGERY)

## 2021-11-29 PROCEDURE — 76000 FLUOROSCOPY <1 HR PHYS/QHP: CPT

## 2021-11-29 PROCEDURE — 25010000002 PHENYLEPHRINE 10 MG/ML SOLUTION: Performed by: NURSE ANESTHETIST, CERTIFIED REGISTERED

## 2021-11-29 PROCEDURE — 0 CEFAZOLIN IN DEXTROSE 2-4 GM/100ML-% SOLUTION: Performed by: THORACIC SURGERY (CARDIOTHORACIC VASCULAR SURGERY)

## 2021-11-29 PROCEDURE — C1769 GUIDE WIRE: HCPCS | Performed by: THORACIC SURGERY (CARDIOTHORACIC VASCULAR SURGERY)

## 2021-11-29 PROCEDURE — 36578 REPLACE TUNNELED CV CATH: CPT | Performed by: THORACIC SURGERY (CARDIOTHORACIC VASCULAR SURGERY)

## 2021-11-29 PROCEDURE — 25010000002 PROPOFOL 10 MG/ML EMULSION: Performed by: NURSE ANESTHETIST, CERTIFIED REGISTERED

## 2021-11-29 PROCEDURE — 0 LIDOCAINE 1 % SOLUTION 20 ML VIAL: Performed by: THORACIC SURGERY (CARDIOTHORACIC VASCULAR SURGERY)

## 2021-11-29 DEVICE — POWERPORT M.R.I. IMPLANTABLE PORT WITH ATTACHABLE 8F CHRONOFLEX OPEN-ENDED SINGLE-LUMEN VENOUS CATHETER INTERMEDIATE KIT (WITH SUTURE PLUGS)
Type: IMPLANTABLE DEVICE | Site: CHEST | Status: FUNCTIONAL
Brand: POWERPORT M.R.I., CHRONOFLEX

## 2021-11-29 RX ORDER — LIDOCAINE HYDROCHLORIDE 20 MG/ML
INJECTION, SOLUTION INFILTRATION; PERINEURAL AS NEEDED
Status: DISCONTINUED | OUTPATIENT
Start: 2021-11-29 | End: 2021-11-29 | Stop reason: SURG

## 2021-11-29 RX ORDER — DIPHENHYDRAMINE HYDROCHLORIDE 50 MG/ML
12.5 INJECTION INTRAMUSCULAR; INTRAVENOUS
Status: DISCONTINUED | OUTPATIENT
Start: 2021-11-29 | End: 2021-11-29 | Stop reason: HOSPADM

## 2021-11-29 RX ORDER — LABETALOL HYDROCHLORIDE 5 MG/ML
5 INJECTION, SOLUTION INTRAVENOUS
Status: DISCONTINUED | OUTPATIENT
Start: 2021-11-29 | End: 2021-11-29 | Stop reason: HOSPADM

## 2021-11-29 RX ORDER — SODIUM CHLORIDE 0.9 % (FLUSH) 0.9 %
3 SYRINGE (ML) INJECTION EVERY 12 HOURS SCHEDULED
Status: DISCONTINUED | OUTPATIENT
Start: 2021-11-29 | End: 2021-11-29 | Stop reason: HOSPADM

## 2021-11-29 RX ORDER — NALOXONE HCL 0.4 MG/ML
0.4 VIAL (ML) INJECTION
Status: CANCELLED | OUTPATIENT
Start: 2021-11-29

## 2021-11-29 RX ORDER — OXYCODONE AND ACETAMINOPHEN 10; 325 MG/1; MG/1
1 TABLET ORAL EVERY 4 HOURS PRN
Status: DISCONTINUED | OUTPATIENT
Start: 2021-11-29 | End: 2021-11-29 | Stop reason: HOSPADM

## 2021-11-29 RX ORDER — IBUPROFEN 600 MG/1
600 TABLET ORAL ONCE AS NEEDED
Status: DISCONTINUED | OUTPATIENT
Start: 2021-11-29 | End: 2021-11-29 | Stop reason: HOSPADM

## 2021-11-29 RX ORDER — HYDROCODONE BITARTRATE AND ACETAMINOPHEN 7.5; 325 MG/1; MG/1
1 TABLET ORAL ONCE AS NEEDED
Status: DISCONTINUED | OUTPATIENT
Start: 2021-11-29 | End: 2021-11-29 | Stop reason: HOSPADM

## 2021-11-29 RX ORDER — FLUMAZENIL 0.1 MG/ML
0.2 INJECTION INTRAVENOUS AS NEEDED
Status: DISCONTINUED | OUTPATIENT
Start: 2021-11-29 | End: 2021-11-29 | Stop reason: HOSPADM

## 2021-11-29 RX ORDER — FENTANYL CITRATE 50 UG/ML
50 INJECTION, SOLUTION INTRAMUSCULAR; INTRAVENOUS
Status: DISCONTINUED | OUTPATIENT
Start: 2021-11-29 | End: 2021-11-29 | Stop reason: HOSPADM

## 2021-11-29 RX ORDER — PROMETHAZINE HYDROCHLORIDE 25 MG/1
25 SUPPOSITORY RECTAL ONCE AS NEEDED
Status: DISCONTINUED | OUTPATIENT
Start: 2021-11-29 | End: 2021-11-29 | Stop reason: HOSPADM

## 2021-11-29 RX ORDER — LIDOCAINE HYDROCHLORIDE 10 MG/ML
0.5 INJECTION, SOLUTION EPIDURAL; INFILTRATION; INTRACAUDAL; PERINEURAL ONCE AS NEEDED
Status: DISCONTINUED | OUTPATIENT
Start: 2021-11-29 | End: 2021-11-29 | Stop reason: HOSPADM

## 2021-11-29 RX ORDER — PROPOFOL 10 MG/ML
VIAL (ML) INTRAVENOUS CONTINUOUS PRN
Status: DISCONTINUED | OUTPATIENT
Start: 2021-11-29 | End: 2021-11-29 | Stop reason: SURG

## 2021-11-29 RX ORDER — PHENYLEPHRINE HYDROCHLORIDE 10 MG/ML
INJECTION INTRAVENOUS AS NEEDED
Status: DISCONTINUED | OUTPATIENT
Start: 2021-11-29 | End: 2021-11-29 | Stop reason: SURG

## 2021-11-29 RX ORDER — HYDROMORPHONE HYDROCHLORIDE 1 MG/ML
0.5 INJECTION, SOLUTION INTRAMUSCULAR; INTRAVENOUS; SUBCUTANEOUS
Status: DISCONTINUED | OUTPATIENT
Start: 2021-11-29 | End: 2021-11-29 | Stop reason: HOSPADM

## 2021-11-29 RX ORDER — DIPHENHYDRAMINE HCL 25 MG
25 CAPSULE ORAL
Status: DISCONTINUED | OUTPATIENT
Start: 2021-11-29 | End: 2021-11-29 | Stop reason: HOSPADM

## 2021-11-29 RX ORDER — LISINOPRIL 20 MG/1
TABLET ORAL
Qty: 30 TABLET | Refills: 5 | Status: SHIPPED | OUTPATIENT
Start: 2021-11-29 | End: 2022-05-25 | Stop reason: SDUPTHER

## 2021-11-29 RX ORDER — EPHEDRINE SULFATE 50 MG/ML
5 INJECTION, SOLUTION INTRAVENOUS ONCE AS NEEDED
Status: DISCONTINUED | OUTPATIENT
Start: 2021-11-29 | End: 2021-11-29 | Stop reason: HOSPADM

## 2021-11-29 RX ORDER — CEPHALEXIN 250 MG/1
250 CAPSULE ORAL 3 TIMES DAILY
Qty: 15 CAPSULE | Refills: 0 | Status: SHIPPED | OUTPATIENT
Start: 2021-11-29 | End: 2021-12-04

## 2021-11-29 RX ORDER — FAMOTIDINE 10 MG/ML
20 INJECTION, SOLUTION INTRAVENOUS ONCE
Status: COMPLETED | OUTPATIENT
Start: 2021-11-29 | End: 2021-11-29

## 2021-11-29 RX ORDER — CEFAZOLIN SODIUM 2 G/100ML
2 INJECTION, SOLUTION INTRAVENOUS ONCE
Status: COMPLETED | OUTPATIENT
Start: 2021-11-29 | End: 2021-11-29

## 2021-11-29 RX ORDER — PROMETHAZINE HYDROCHLORIDE 25 MG/1
25 TABLET ORAL ONCE AS NEEDED
Status: DISCONTINUED | OUTPATIENT
Start: 2021-11-29 | End: 2021-11-29 | Stop reason: HOSPADM

## 2021-11-29 RX ORDER — HYDRALAZINE HYDROCHLORIDE 20 MG/ML
5 INJECTION INTRAMUSCULAR; INTRAVENOUS
Status: DISCONTINUED | OUTPATIENT
Start: 2021-11-29 | End: 2021-11-29 | Stop reason: HOSPADM

## 2021-11-29 RX ORDER — ONDANSETRON 2 MG/ML
4 INJECTION INTRAMUSCULAR; INTRAVENOUS ONCE AS NEEDED
Status: DISCONTINUED | OUTPATIENT
Start: 2021-11-29 | End: 2021-11-29 | Stop reason: HOSPADM

## 2021-11-29 RX ORDER — SODIUM CHLORIDE 0.9 % (FLUSH) 0.9 %
3-10 SYRINGE (ML) INJECTION AS NEEDED
Status: DISCONTINUED | OUTPATIENT
Start: 2021-11-29 | End: 2021-11-29 | Stop reason: HOSPADM

## 2021-11-29 RX ORDER — NALOXONE HCL 0.4 MG/ML
0.2 VIAL (ML) INJECTION AS NEEDED
Status: DISCONTINUED | OUTPATIENT
Start: 2021-11-29 | End: 2021-11-29 | Stop reason: HOSPADM

## 2021-11-29 RX ORDER — MORPHINE SULFATE 2 MG/ML
2 INJECTION, SOLUTION INTRAMUSCULAR; INTRAVENOUS EVERY 4 HOURS PRN
Status: CANCELLED | OUTPATIENT
Start: 2021-11-29 | End: 2021-12-06

## 2021-11-29 RX ORDER — MIDAZOLAM HYDROCHLORIDE 1 MG/ML
1 INJECTION INTRAMUSCULAR; INTRAVENOUS
Status: DISCONTINUED | OUTPATIENT
Start: 2021-11-29 | End: 2021-11-29 | Stop reason: HOSPADM

## 2021-11-29 RX ORDER — SODIUM CHLORIDE, SODIUM LACTATE, POTASSIUM CHLORIDE, CALCIUM CHLORIDE 600; 310; 30; 20 MG/100ML; MG/100ML; MG/100ML; MG/100ML
9 INJECTION, SOLUTION INTRAVENOUS CONTINUOUS
Status: DISCONTINUED | OUTPATIENT
Start: 2021-11-29 | End: 2021-11-29 | Stop reason: HOSPADM

## 2021-11-29 RX ADMIN — PROPOFOL 100 MCG/KG/MIN: 10 INJECTION, EMULSION INTRAVENOUS at 07:33

## 2021-11-29 RX ADMIN — MIDAZOLAM 1 MG: 1 INJECTION INTRAMUSCULAR; INTRAVENOUS at 07:18

## 2021-11-29 RX ADMIN — PHENYLEPHRINE HYDROCHLORIDE 100 MCG: 10 INJECTION, SOLUTION INTRAVENOUS at 08:25

## 2021-11-29 RX ADMIN — FAMOTIDINE 20 MG: 10 INJECTION INTRAVENOUS at 07:18

## 2021-11-29 RX ADMIN — CEFAZOLIN SODIUM 2 G: 2 INJECTION, SOLUTION INTRAVENOUS at 07:20

## 2021-11-29 RX ADMIN — LIDOCAINE HYDROCHLORIDE 100 MG: 20 INJECTION, SOLUTION INFILTRATION; PERINEURAL at 07:33

## 2021-11-29 RX ADMIN — SODIUM CHLORIDE, POTASSIUM CHLORIDE, SODIUM LACTATE AND CALCIUM CHLORIDE 9 ML/HR: 600; 310; 30; 20 INJECTION, SOLUTION INTRAVENOUS at 06:35

## 2021-11-29 NOTE — ANESTHESIA POSTPROCEDURE EVALUATION
"Patient: Holli Patel    Procedure Summary     Date: 11/29/21 Room / Location: [facility] OR [address] [place] [address] MAIN OR    Anesthesia Start: 0729 Anesthesia Stop: 0839    Procedure: REVISION AND REPLACEMENT RIGHT SUBCLAVIAN VENOUS ACCESS PORT (Right ) Diagnosis:       Adenocarcinoma of left lung (HCC)      Encounter for fitting and adjustment of vascular catheter      (Adenocarcinoma of left lung (HCC) [C34.92])      (Encounter for fitting and adjustment of vascular catheter [Z45.2])    Surgeons: Raphael Kerr III, MD Provider: Luiz Dickson MD    Anesthesia Type: MAC ASA Status: 3          Anesthesia Type: MAC    Vitals  Vitals Value Taken Time   /80 11/29/21 0841   Temp 36.4 °C (97.6 °F) 11/29/21 0836   Pulse 98 11/29/21 0848   Resp 16 11/29/21 0836   SpO2 94 % 11/29/21 0848   Vitals shown include unvalidated device data.        Post Anesthesia Care and Evaluation    Patient location during evaluation: bedside  Patient participation: complete - patient participated  Level of consciousness: sleepy but conscious  Pain score: 0  Pain management: adequate  Airway patency: patent  Anesthetic complications: No anesthetic complications    Cardiovascular status: acceptable  Respiratory status: acceptable  Hydration status: acceptable    Comments: /74 (BP Location: Left arm, Patient Position: Lying)   Pulse 105   Temp 36.4 °C (97.6 °F) (Oral)   Resp 16   Ht 160 cm (63\")   Wt 82.2 kg (181 lb 4.8 oz)   LMP  (LMP Unknown)   SpO2 95%   BMI 32.12 kg/m²         "

## 2021-11-29 NOTE — ANESTHESIA PREPROCEDURE EVALUATION
Anesthesia Evaluation     Patient summary reviewed and Nursing notes reviewed   history of anesthetic complications: PONV  NPO Solid Status: > 8 hours  NPO Liquid Status: > 4 hours           Airway   Mallampati: II  Neck ROM: full  No difficulty expected  Dental - normal exam     Pulmonary     breath sounds clear to auscultation  (+) a smoker Former, lung cancer, shortness of breath,   Cardiovascular     Rhythm: regular    (+) hypertension, hyperlipidemia,       Neuro/Psych  (+) headaches, psychiatric history Anxiety,     GI/Hepatic/Renal/Endo    (+)  GERD,      Musculoskeletal     Abdominal    Substance History      OB/GYN          Other   arthritis,    history of cancer (breast) active                    Anesthesia Plan    ASA 3     MAC     intravenous induction     Anesthetic plan, all risks, benefits, and alternatives have been provided, discussed and informed consent has been obtained with: patient.

## 2021-12-01 DIAGNOSIS — C34.12 PRIMARY ADENOCARCINOMA OF UPPER LOBE OF LEFT LUNG (HCC): ICD-10-CM

## 2021-12-01 RX ORDER — HYDROCODONE BITARTRATE AND ACETAMINOPHEN 5; 325 MG/1; MG/1
1 TABLET ORAL EVERY 6 HOURS PRN
Qty: 60 TABLET | Refills: 0 | Status: CANCELLED | OUTPATIENT
Start: 2021-12-01

## 2021-12-01 RX ORDER — OXYCODONE HYDROCHLORIDE AND ACETAMINOPHEN 5; 325 MG/1; MG/1
1 TABLET ORAL EVERY 6 HOURS PRN
Qty: 50 TABLET | Refills: 0 | Status: SHIPPED | OUTPATIENT
Start: 2021-12-01 | End: 2022-01-03 | Stop reason: SDUPTHER

## 2021-12-03 DIAGNOSIS — R59.0 MEDIASTINAL ADENOPATHY: ICD-10-CM

## 2021-12-03 RX ORDER — RIZATRIPTAN BENZOATE 10 MG/1
TABLET ORAL
Qty: 12 TABLET | Refills: 0 | Status: SHIPPED | OUTPATIENT
Start: 2021-12-03 | End: 2022-01-05

## 2021-12-03 RX ORDER — GABAPENTIN 300 MG/1
CAPSULE ORAL
Qty: 180 CAPSULE | Refills: 0 | Status: SHIPPED | OUTPATIENT
Start: 2021-12-03 | End: 2022-02-14 | Stop reason: SDUPTHER

## 2021-12-13 ENCOUNTER — DOCUMENTATION (OUTPATIENT)
Dept: ONCOLOGY | Facility: CLINIC | Age: 56
End: 2021-12-13

## 2021-12-13 ENCOUNTER — INFUSION (OUTPATIENT)
Dept: ONCOLOGY | Facility: HOSPITAL | Age: 56
End: 2021-12-13

## 2021-12-13 ENCOUNTER — OFFICE VISIT (OUTPATIENT)
Dept: ONCOLOGY | Facility: CLINIC | Age: 56
End: 2021-12-13

## 2021-12-13 VITALS
HEIGHT: 63 IN | WEIGHT: 175 LBS | SYSTOLIC BLOOD PRESSURE: 113 MMHG | HEART RATE: 80 BPM | OXYGEN SATURATION: 95 % | RESPIRATION RATE: 18 BRPM | DIASTOLIC BLOOD PRESSURE: 77 MMHG | TEMPERATURE: 97.8 F | BODY MASS INDEX: 31.01 KG/M2

## 2021-12-13 DIAGNOSIS — C50.411 MALIGNANT NEOPLASM OF UPPER-OUTER QUADRANT OF RIGHT BREAST IN FEMALE, ESTROGEN RECEPTOR POSITIVE (HCC): ICD-10-CM

## 2021-12-13 DIAGNOSIS — C34.12 PRIMARY ADENOCARCINOMA OF UPPER LOBE OF LEFT LUNG (HCC): ICD-10-CM

## 2021-12-13 DIAGNOSIS — C34.12 PRIMARY ADENOCARCINOMA OF UPPER LOBE OF LEFT LUNG (HCC): Primary | ICD-10-CM

## 2021-12-13 DIAGNOSIS — Z79.899 HIGH RISK MEDICATION USE: ICD-10-CM

## 2021-12-13 DIAGNOSIS — C80.1 ANXIETY ASSOCIATED WITH CANCER DIAGNOSIS (HCC): ICD-10-CM

## 2021-12-13 DIAGNOSIS — C79.51 NON-SMALL CELL LUNG CANCER METASTATIC TO BONE (HCC): ICD-10-CM

## 2021-12-13 DIAGNOSIS — C34.90 NON-SMALL CELL LUNG CANCER METASTATIC TO BONE (HCC): ICD-10-CM

## 2021-12-13 DIAGNOSIS — Z17.0 MALIGNANT NEOPLASM OF UPPER-OUTER QUADRANT OF RIGHT BREAST IN FEMALE, ESTROGEN RECEPTOR POSITIVE (HCC): ICD-10-CM

## 2021-12-13 DIAGNOSIS — F41.1 ANXIETY ASSOCIATED WITH CANCER DIAGNOSIS (HCC): ICD-10-CM

## 2021-12-13 LAB
ALBUMIN SERPL-MCNC: 4.7 G/DL (ref 3.5–5.2)
ALBUMIN/GLOB SERPL: 1.6 G/DL (ref 1.1–2.4)
ALP SERPL-CCNC: 110 U/L (ref 38–116)
ALT SERPL W P-5'-P-CCNC: 10 U/L (ref 0–33)
ANION GAP SERPL CALCULATED.3IONS-SCNC: 12.9 MMOL/L (ref 5–15)
AST SERPL-CCNC: 20 U/L (ref 0–32)
BASOPHILS # BLD AUTO: 0.05 10*3/MM3 (ref 0–0.2)
BASOPHILS NFR BLD AUTO: 0.6 % (ref 0–1.5)
BILIRUB SERPL-MCNC: 0.2 MG/DL (ref 0.2–1.2)
BUN SERPL-MCNC: 15 MG/DL (ref 6–20)
BUN/CREAT SERPL: 13 (ref 7.3–30)
CALCIUM SPEC-SCNC: 9.6 MG/DL (ref 8.5–10.2)
CHLORIDE SERPL-SCNC: 104 MMOL/L (ref 98–107)
CO2 SERPL-SCNC: 23.1 MMOL/L (ref 22–29)
CREAT SERPL-MCNC: 1.15 MG/DL (ref 0.6–1.1)
DEPRECATED RDW RBC AUTO: 49.6 FL (ref 37–54)
EOSINOPHIL # BLD AUTO: 0.1 10*3/MM3 (ref 0–0.4)
EOSINOPHIL NFR BLD AUTO: 1.2 % (ref 0.3–6.2)
ERYTHROCYTE [DISTWIDTH] IN BLOOD BY AUTOMATED COUNT: 15.2 % (ref 12.3–15.4)
GFR SERPL CREATININE-BSD FRML MDRD: 48 ML/MIN/1.73
GLOBULIN UR ELPH-MCNC: 2.9 GM/DL (ref 1.8–3.5)
GLUCOSE SERPL-MCNC: 104 MG/DL (ref 74–124)
HCT VFR BLD AUTO: 35.7 % (ref 34–46.6)
HGB BLD-MCNC: 10.6 G/DL (ref 12–15.9)
IMM GRANULOCYTES # BLD AUTO: 0.04 10*3/MM3 (ref 0–0.05)
IMM GRANULOCYTES NFR BLD AUTO: 0.5 % (ref 0–0.5)
LYMPHOCYTES # BLD AUTO: 0.96 10*3/MM3 (ref 0.7–3.1)
LYMPHOCYTES NFR BLD AUTO: 11.2 % (ref 19.6–45.3)
MCH RBC QN AUTO: 26.7 PG (ref 26.6–33)
MCHC RBC AUTO-ENTMCNC: 29.7 G/DL (ref 31.5–35.7)
MCV RBC AUTO: 89.9 FL (ref 79–97)
MONOCYTES # BLD AUTO: 0.76 10*3/MM3 (ref 0.1–0.9)
MONOCYTES NFR BLD AUTO: 8.9 % (ref 5–12)
NEUTROPHILS NFR BLD AUTO: 6.63 10*3/MM3 (ref 1.7–7)
NEUTROPHILS NFR BLD AUTO: 77.6 % (ref 42.7–76)
NRBC BLD AUTO-RTO: 0 /100 WBC (ref 0–0.2)
PLATELET # BLD AUTO: 394 10*3/MM3 (ref 140–450)
PMV BLD AUTO: 8.4 FL (ref 6–12)
POTASSIUM SERPL-SCNC: 4.3 MMOL/L (ref 3.5–4.7)
PROT SERPL-MCNC: 7.6 G/DL (ref 6.3–8)
RBC # BLD AUTO: 3.97 10*6/MM3 (ref 3.77–5.28)
SODIUM SERPL-SCNC: 140 MMOL/L (ref 134–145)
WBC NRBC COR # BLD: 8.54 10*3/MM3 (ref 3.4–10.8)

## 2021-12-13 PROCEDURE — 85025 COMPLETE CBC W/AUTO DIFF WBC: CPT

## 2021-12-13 PROCEDURE — 25010000002 CYANOCOBALAMIN PER 1000 MCG: Performed by: NURSE PRACTITIONER

## 2021-12-13 PROCEDURE — 25010000002 PEMBROLIZUMAB 100 MG/4ML SOLUTION 4 ML VIAL: Performed by: NURSE PRACTITIONER

## 2021-12-13 PROCEDURE — 80053 COMPREHEN METABOLIC PANEL: CPT

## 2021-12-13 PROCEDURE — 25010000002 PEMETREXED PER 10 MG: Performed by: NURSE PRACTITIONER

## 2021-12-13 PROCEDURE — 99214 OFFICE O/P EST MOD 30 MIN: CPT | Performed by: NURSE PRACTITIONER

## 2021-12-13 PROCEDURE — 96411 CHEMO IV PUSH ADDL DRUG: CPT

## 2021-12-13 PROCEDURE — 96372 THER/PROPH/DIAG INJ SC/IM: CPT

## 2021-12-13 PROCEDURE — 96413 CHEMO IV INFUSION 1 HR: CPT

## 2021-12-13 RX ORDER — CYANOCOBALAMIN 1000 UG/ML
1000 INJECTION, SOLUTION INTRAMUSCULAR; SUBCUTANEOUS ONCE
Status: COMPLETED | OUTPATIENT
Start: 2021-12-13 | End: 2021-12-13

## 2021-12-13 RX ORDER — SODIUM CHLORIDE 9 MG/ML
250 INJECTION, SOLUTION INTRAVENOUS ONCE
Status: COMPLETED | OUTPATIENT
Start: 2021-12-13 | End: 2021-12-13

## 2021-12-13 RX ORDER — CYANOCOBALAMIN 1000 UG/ML
1000 INJECTION, SOLUTION INTRAMUSCULAR; SUBCUTANEOUS ONCE
Status: CANCELLED | OUTPATIENT
Start: 2021-12-13 | End: 2021-12-13

## 2021-12-13 RX ORDER — PROCHLORPERAZINE MALEATE 10 MG
TABLET ORAL
COMMUNITY
Start: 2021-12-10 | End: 2022-01-24 | Stop reason: SDUPTHER

## 2021-12-13 RX ORDER — OLANZAPINE 5 MG/1
5 TABLET ORAL NIGHTLY
Qty: 30 TABLET | Refills: 1 | Status: SHIPPED | OUTPATIENT
Start: 2021-12-13 | End: 2022-03-18 | Stop reason: SDUPTHER

## 2021-12-13 RX ORDER — SODIUM CHLORIDE 9 MG/ML
250 INJECTION, SOLUTION INTRAVENOUS ONCE
Status: CANCELLED | OUTPATIENT
Start: 2021-12-13

## 2021-12-13 RX ADMIN — SODIUM CHLORIDE 930 MG: 900 INJECTION INTRAVENOUS at 14:48

## 2021-12-13 RX ADMIN — SODIUM CHLORIDE 200 MG: 900 INJECTION INTRAVENOUS at 14:17

## 2021-12-13 RX ADMIN — CYANOCOBALAMIN 1000 MCG: 1000 INJECTION, SOLUTION INTRAMUSCULAR at 13:56

## 2021-12-13 RX ADMIN — SODIUM CHLORIDE 250 ML: 9 INJECTION, SOLUTION INTRAVENOUS at 13:56

## 2021-12-13 NOTE — PROGRESS NOTES
Subjective   Holli Patel is a 56 y.o. female.  Referred by Dr. 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    3/1/2021-bilateral screening mammogram-microcalcifications seen in the posterior one third of the lateral aspect of the right breast.  Area of focal asymmetry seen in the middle one third of the upper inner quadrant of the left breast.    3/1/2021-DEXA scan-T score of -2.2 on the lumbar spine and T score of -2.3 in the left hip and T score of -1.9 in the right hip.  Findings consistent with osteopenia    3/23/2021-screening lung CT-there is a 10 x 11 mm solid nodule in the left upper lobe.  Enlarged AP window lymph node measuring 14 x 10 mm.  Suggestion of a possible 9 mm left hilar lymph node.  Heavy coronary artery calcification.    3/23/2021-diagnostic mammogram bilateral-cluster of microcalcifications in the middle third lateral aspect of the right breast.  Left breast demonstrates persistence of the area of focal asymmetry in the region.    Ultrasound-left breast ultrasound at 10 o'clock position, 6 cm from the nipple there is a 0.4 cm irregular hypoechoic lesion.  Stereotactic biopsy of the right breast calcifications recommended.  Ultrasound-guided biopsy of the left breast lesion recommended    4/7/2021-right breast stereotactic biopsy and left breast ultrasound-guided biopsy  Pathology  Right breast-invasive ductal carcinoma, grade 2, lymphovascular invasion present, ER +99% strong, NM +80% moderate, HER-2 negative, Ki-67 12%  Left breast upper inner quadrant 10 o'clock position biopsy, invasive ductal carcinoma, grade   2, ER +99% strong, NM +40% strong, HER-2 2+ on immunohistochemistry, nonamplified on FISH Ki-67 10%    4/14/2021-PET/CT-FDG avid aortopulmonary window lymph node measures 0.9 cm with an SUV of 7.5.  FDG avid left hilar lymph node  measures 1 cm with an SUV of 17.2.  Irregular soft tissue density in the right breast measuring 3.7 x 3.8 cm is favored to be secondary to the recent breast biopsy.  1.1 cm pulmonary nodule within the left upper lobe with SUV 9.7 .  Sub-6 mm pulmonary nodules are present in the right lower lobe.  No evidence of lymphadenopathy or metastatic disease in the abdomen.  Focal area of FDG uptake within the central canal posterior to T11-T12 displaced demonstrating an SUV of 5.5 thought to be reactive however MRI is recommended for further evaluation.    She was seen by Dr. Molina who initially planned for breast surgery however  due to the PET abnormalities she has been referred to Dr. Kerr who plans to do a wound on 4/30/2021.  Dr. Molina's and Dr. Kerr's notes reviewed.    Patient denies any family history of breast cancer.  Her mother had lymphoma.  Maternal grandmother had colon cancer.  Prior to that screening mammogram she did not have any palpable abnormalities of either breast.    She has been a heavy smoker for about 40 years and smoked 2 packs/day.  Denies any recent weight changes, new bone pains, cough, abdominal pain nausea vomiting constipation or diarrhea.  She does have anxiety and has been on several medications.  She has recently been started on Xanax to help with the mood and also with insomnia.    Patient had a video-assisted thoracoscopy and mediastinal lymphadenectomy on 4/30/2021.  L5-L6 lymph nodes were biopsied however the small pulmonary nodule in the left upper lobe was not difficult to find.  Pathology showed moderately differentiated adenocarcinoma which is CK7 and TTF-1 positive.  Consistent with lung primary.  Ki-67 85%.    5/14/2021-MRI of the brain-minimal chronic small vessel ischemic change in the white matter.  Otherwise negative MRI.    5/20/2021-bilateral axillary ultrasound-no evidence of axillary lymphadenopathy in either axilla.  Normal-appearing bilateral axillary lymph  nodes visualized.    Cycle 1 cisplatin and Alimta on 5/25/2021.    Cycle 2 cisplatin Alimta 6/15/2021    Cycle 3 cisplatin Alimta 7/7/2021    Completed radiation on 7/12/2021    Port was nonfunctional hence a port study was performed which showed that the port was backed up into the innominate vein and also there was a nonocclusive thrombus at the end of the catheter extending into the superior vena cava.  She was started on anticoagulation with Eliquis.  It was recommended for port revision of the intention is to keep the port.    PET/CT 8/5/2021-images independently reviewed and interpreted by me-decrease in size and FDG uptake of the left upper lobe pulmonary nodule as well as mediastinal and left hilar lymphadenopathy representing response to treatment.  Sub-6 mm pulmonary nodule in the left upper lobe new since 4/14/2021.  This could be related to radiation however follow-up CT in 3 months recommended.  Decrease in size of the irregular masslike tissue in the right breast which is postbiopsy hematoma.  This is not PET avid.  New segmental left eighth rib fractures healing.  A new band of sclerosis of the left seventh rib which favored to represent healing nondisplaced fracture.  Follow-up CT recommended.  focal uptake in the duodenum first and second portions.  Could be related to duodenitis.  Indeterminate lesion in the L1 vertebral body not well evaluated on PET/CT.    10/1/2021-MRI of the lumbar spine.  Lesion in the left posterior body of L1 measures 14 x 14 x 12 mm and previously 5 x 5 x 6 mm.  The interval enlargement strongly suggest that it is metastatic lesion.  No additional osseous metastasis noted.   Other chronic changes noted.    10/14/2021-biopsy of the lumbar spine lesion-pathology consistent with poorly differentiated carcinoma.  The staining is similar to the prior tumors and fevers this being metastatic poorly differentiated pulmonary adenocarcinoma.    10/26/2021-brain MRI-no evidence of  metastatic disease.    10/26/2021-bilateral diagnostic mammogram and ultrasound  Scattered fibroglandular density in both breast.  Postbiopsy hematoma with internal biopsy clip in the upper outer quadrant of the right breast, 8 cm from the nipple.  The hematoma size is decreased to 2.9 cm from 3.6 cm earlier.    Left breast-parenchymal density measuring 9 x 10 mm has markedly decreased.  Few adjacent calcifications which are unchanged.  No new abnormality in the left breast.  At 10:00 region there is some minimal adjacent hypoechoic texture which is difficult to measure but appears smaller since the previous exam.    10/28/2021-PET/CT  New L1 and L2 lytic bone metastasis annually intensely hypermetabolic focus at the left adrenal gland likely represents metastatic disease as well.  Slight decrease in size of the 0.9 x 0.7 cm left upper lobe pulmonary nodule.  There is hypermetabolic activity related to radiation pneumonitis.  The remainder of the nodule is photopenic.  Uncertain etiology of the more intense activity along the right lateral peripheral margin of the 2.6 cm postbiopsy density of the right breast.    She completed radiation to to the lumbar spine on 11/19/2021 11/22/2021-cycle 1 Alimta and Keytruda    Interval history:   The patient is a 56 y.o. female the above-mentioned street, who returns the office today in anticipation of her second cycle of Keytruda Alimta.  She reports she tolerated her first infusion very well.  She denies any new shortness of breath, nausea.  She denies any pain.  Her only concern today is worsening anxiety.  She cares for her ill mother who has dementia and has recently been urinary tract infection.  She reports the stress of caring for her mother and herself has become taxing.  She does take Prozac along with nightly Xanax as needed.  She questions additional medications today.  She denies any new pain.  She has completed radiation to the lumbar spine reports she has no  new symptoms.      The following portions of the patient's history were reviewed and updated as appropriate: allergies, current medications, past family history, past medical history, past social history, past surgical history and problem list.    Past Medical History:   Diagnosis Date   • Anxiety    • Clot     POSSIBLE BLOOD CLOT IN VENOUS ACCESS DEVICE-PT STATES WAS STARTED ON ELIQUIS    • Dyspnea on exertion    • Elevated cholesterol    • Frequent urination     DAY AND NIGHT   • SHAYY (generalized anxiety disorder)     RELATED HIGH BLOOD PRESSURE   • GERD (gastroesophageal reflux disease)    • High blood pressure     ANXIETY RELATED   • Hyperlipidemia    • Hypothyroidism    • Lung cancer (HCC)    • Lung nodule     LEFT   • Malignant neoplasm of upper-outer quadrant of right breast in female, estrogen receptor positive (HCC) 4/13/2021    PT STATES HAS BILAT BREAST CANCER   • Migraine    • PONV (postoperative nausea and vomiting)         Past Surgical History:   Procedure Laterality Date   • BREAST BIOPSY Bilateral 04/07/2021    Right Breast 10 o'clock & Left breast 10 o'clock 6 cm from nipple, [facility]   • CLOSED REDUCTION HIP DISLOCATION Left 4/30/2021    Procedure: BRONCHOSCOPY, LEFT VIDEO ASSITED THORACOSCOPY, ROBOTIC ASSSITED MEDIASTINAL LYMPHADENECTOMY WITH INTERCOSTAL NERVE BLOCKS;  Surgeon: Raphael Kerr III, MD;  Location: [facility];  Service: DaVinci;  Laterality: Left;   • COLONOSCOPY     • TUBAL ABDOMINAL LIGATION     • VENOUS ACCESS DEVICE (PORT) INSERTION Right 5/20/2021    Procedure: INSERTION VENOUS ACCESS DEVICE;  Surgeon: Raphael Kerr III, MD;  Location: [facility] OR;  Service: Thoracic;  Laterality: Right;   • VENOUS ACCESS DEVICE (PORT) INSERTION Right 11/29/2021    Procedure: REVISION AND REPLACEMENT RIGHT SUBCLAVIAN VENOUS ACCESS PORT;  Surgeon: Raphael Kerr III, MD;  Location: [facility] OR;  Service: Thoracic;  Laterality: Right;   • WRIST SURGERY Right         Family History  "  Problem Relation Age of Onset   • Cancer Father         LYMPHATIC   • Prostate cancer Father    • Lymphoma Father    • Alcohol abuse Brother    • Colon cancer Maternal Grandmother    • Malig Hyperthermia Neg Hx         Social History     Socioeconomic History   • Marital status:      Spouse name: Jelani   Tobacco Use   • Smoking status: Former Smoker     Packs/day: 1.00     Years: 30.00     Pack years: 30.00     Types: Electronic Cigarette, Cigarettes     Start date: 1981     Quit date:      Years since quittin.9   • Smokeless tobacco: Never Used   • Tobacco comment: ABT 15 CIGARETTES DAILY   Vaping Use   • Vaping Use: Some days   • Substances: Nicotine, Flavoring   • Devices: Disposable   Substance and Sexual Activity   • Alcohol use: Never   • Drug use: Never   • Sexual activity: Yes     Partners: Male        OB History        2    Para   2    Term   2            AB        Living           SAB        IAB        Ectopic        Molar        Multiple        Live Births                Age of menarche-10  Age at menopause-42  Oral contraceptive pill use-15 years  No HRT use  She has 2 children  Age at first live childbirth-17    No Known Allergies     Review of Systems   Review of systems as mentioned in the HPI    Objective   Blood pressure 113/77, pulse 80, temperature 97.8 °F (36.6 °C), temperature source Temporal, resp. rate 18, height 160 cm (62.99\"), weight 79.4 kg (175 lb), SpO2 95 %, not currently breastfeeding.       Physical Exam  Vitals reviewed.   HENT:      Head: Normocephalic.   Eyes:      Extraocular Movements: Extraocular movements intact.      Conjunctiva/sclera: Conjunctivae normal.      Pupils: Pupils are equal, round, and reactive to light.   Cardiovascular:      Rate and Rhythm: Normal rate and regular rhythm.      Pulses: Normal pulses.      Heart sounds: Normal heart sounds.   Pulmonary:      Effort: Pulmonary effort is normal.      Breath sounds: Normal breath " sounds.   Abdominal:      General: Abdomen is flat. Bowel sounds are normal. There is no distension.      Palpations: There is no mass.   Musculoskeletal:         General: No swelling. Normal range of motion.      Cervical back: Normal range of motion.   Skin:     General: Skin is warm.   Neurological:      General: No focal deficit present.      Mental Status: She is alert and oriented to person, place, and time.   Psychiatric:         Mood and Affect: Mood normal.         Behavior: Behavior normal.     Breast Exam: Right breast with a large postbiopsy hematoma in the upper outer quadrant measuring 4 cm.    No other palpable abnormalities.  Left breast with no palpable abnormalities.  No right or left axillary lymphadenopathy(breast not examined today, 12/13/2021)    I have reexamined the patient and the results are consistent with the previously documented exam. KORI Edwards     Results from last 7 days   Lab Units 12/13/21  1254   WBC 10*3/mm3 8.54   NEUTROS ABS 10*3/mm3 6.63   HEMOGLOBIN g/dL 10.6*   HEMATOCRIT % 35.7   PLATELETS 10*3/mm3 394     Results from last 7 days   Lab Units 12/13/21  1254   SODIUM mmol/L 140   POTASSIUM mmol/L 4.3   CHLORIDE mmol/L 104   CO2 mmol/L 23.1   BUN mg/dL 15   CREATININE mg/dL 1.15*   CALCIUM mg/dL 9.6   ALBUMIN g/dL 4.70   BILIRUBIN mg/dL 0.2   ALK PHOS U/L 110   ALT (SGPT) U/L 10   AST (SGOT) U/L 20   GLUCOSE mg/dL 104           XR Chest Post CVA Port    Result Date: 11/29/2021  1. Right right-sided MediPort catheter tip in the SVC. 2. No pneumothorax is seen.  This report was finalized on 11/29/2021 9:21 AM by Dr. Carlos Rosenberg M.D.        Assessment/Plan      *Bilateral breast cancer  · Invasive ductal carcinoma in both right and left breast and lesions measure under 1 cm.  No palpable lymphadenopathy although unsure if this has been evaluated by an axillary ultrasound.  No abnormal axillary lymphadenopathy noted on PET.  · Most likely clinical T1b N0  M0, both cancers were noted to be grade 2, ER/MN positive and HER-2 negative and Ki-67 less than 15%  VATS on 4/30/2021  Biopsy confirms adenocarcinoma of pulmonary primary  Discussed with Dr. Molina about delaying breast surgery and she is in agreement  Darted on neoadjuvant anastrozole  · Patient experienced significant adverse effects with anastrozole  · Treatment changed to letrozole   · Axillary ultrasound 4/20/2021 -negative for any axillary lymphadenopathy  · Started letrozole in May 2021  · She continues on letrozole without any problems.  · 8/24/2021 bilateral diagnostic mammogram and ultrasound.  In the left breast the tumor now measures 1 cm on mammogram as opposed to 0.8 cm in March.  Right breast calcifications also noted to be in a large extent although the number seems to be decreased.  Hematoma has decreased in size.  · 10/26/2021-bilateral diagnostic mammogram and ultrasound-decrease in size of the bilateral breast lesions.  · 10/26/2021-PET/CT-metastatic lesions in the left adrenal gland, L1-L2.  · 10/26/2021-MRI of the brain-no evidence of metastatic disease  · Since the breast lesions are decreasing in size, we will continue with letrozole.  · We previously discussed changing treatment to exemestane however since the most recent mammogram and ultrasound shows decrease in the breast lesions I think it is reasonable to proceed with letrozole.  · We will hold off on lumpectomy at this time to see what the course of lung cancer would be.  · She continues on letrozole without any problems.      *Adenocarcinoma of the left lung  · Most likely stage T1 N2 M0, stage III  · MRI of the thoracic spine ordered to further evaluate the abnormality seen on PET  · MRI of the brain negative   · Given that she is only 55 and fairly good performance status I discussed with her by with concurrent chemoradiation with cisplatin and Alimta every 3 weeks followed by durvalumab.  · Adverse effects including but not  limited to myelosuppression, increased risk of infections, nausea, vomiting, renal dysfunction, ototoxicity, neurotoxicity have been discussed at length.  · MRI of the thoracic spine performed 5/24/2021 shows no evidence of metastatic disease in the thoracic spine however in L1 there is a 7 mm lesion which is indeterminate.  A lumbar spine MRI has been recommended.  · Discussed her case with Dr. Oates and he feels like MRI of the lumbar spine may also show that this lesion is indeterminate.  · Discussed the same with the patient and her .  · Even if this is metastatic disease this might be the only lesion of metastatic disease and then would consider this oligometastatic disease.  · Therefore plan to proceed with concurrent chemoradiation as scheduled.  · cisplatin and Alimta, C1D1 5/25/2021  · Day 1 of radiation 5/25/2021  · 5/27/2021-MRI of the lumbar spine-there is a 6 x 5 x 6 mm enhancing lesion in the left posterior body of L1 vertebra.  This is considered indeterminate.  Follow-up recommended.  Degenerative changes noted at L5-S1  · 7/7/2021, cycle 3 cisplatin Alimta  · 7/12/2021-radiation completed  · PET/CT 8/6/2021 with good response to chemoradiation.  · MRI of the lumbar spine 10/1/2021 with increase in size of the L1 lesion which now measures 14 mm as opposed to 6 mm in May 2021.  · 10/14/2021-biopsy of the L1 lesion and pathology consistent with adenocarcinoma of the lung, TPS 0  · 0/26/2021-PET/CT-metastatic lesions in the left adrenal gland, L1-L2.  · 10/26/2021-MRI of the brain-no evidence of metastatic disease  · Given that she only has metastatic disease in L1-L2 and left adrenal gland I think it would be possible to radiate all these regions.  · Completed radiation to L1-L2 on 11/19/2021  · 11/22/2021-cycle 1 of Keytruda and Alimta  · Proceed today, 11/13/2021 with cycle 2 Keytruda    *Bilateral breast cancer-no family history of breast cancer but given synchronous breast cancer genetic  testing has been sent.  Genetic testing with a variant of unknown significance.    *Right hand numbness and difficulty with grasping objects  · Previously had history of carpal tunnel requiring repair.  · She is currently on letrozole which could cause worsening of the carpal tunnel  · Gabapentin dose will be increased to 600 mg 3 times daily  · She will also be referred to hand surgery for further evaluation.  · She now reports bilateral upper extremity numbness.  · Continue gabapentin  · MRI of the brain 10/26/2021 without any evidence of metastatic disease    *Anxiety-  · Currently on Xanax 1 mg nightly  · She required Valium for the MRI as she had claustrophobia and a panic attack  · Remains anxious secondary to recent progression of metastatic lung cancer  · Continues to struggle with anxiety  · Her visit with supportive oncology was canceled  · We will ask the  to meet with the patient today and reschedule with supportive oncology.  In the interim, begin Zyprexa 5 mg nightly    *Left-sided chest wall pain-secondary to VATS.  Most likely neuropathic.  Continue gabapentin and Norco as needed.  She ran out of gabapentin and experienced leg cramps.    Continue gabapentin      *Hypertension-continue lisinopril.  Blood pressure 101/70    *Smoking-not addressed today    *GERD  · 7/7/2021, patient complains of reflux despite the use of Prilosec 20 mg twice daily.  We will send her a prescription for Carafate slurry 4 times daily.  · She has not quite started using the Carafate.  · Continue Prilosec and recommend using Carafate  · PET/CT shows duodenitis      *Port not returning blood  · Patient reports the last treatment that difficulty getting blood return.  Today nursing was unable to get blood return and have placed Activase without success.  · Patient went for port dye study earlier today on 7/19/2021 with report called to me by radiology that is positive for a nonocclusive thrombus at the end of the  port catheter and extending into the SVC.  The end of the catheter has backed up into the innominate vein.  This was discussed with Dr. Snell and there is no need for additional imaging at this time.  We initially did not use the Mediport.  This is now been revised.  · She has been started on Eliquis.  Completed a week of 10 mg twice daily and now on 5 mg.  Continue the same  · We will obtain imaging to see if the disease is responded.  If patient wishes to get the port out then we will continue with anticoagulation for 3 months and remove the port.  However if she wishes to keep the port then a port revision will have to be performed.    · A new Mediport was placed by thoracic surgery 11/29/2021, Eliquis has been discontinued and right chest Mediport is now safe to use.    PLAN:  1. Proceed today with cycle 2 Keytruda Alimta  2. Continue letrozole 2.5 mg daily   3. Begin olanzapine 5 mg nightly  4. We will ask the  to meet with the patient and determine if further referral to behavioral oncology is necessary  5. B12 injection  6. Continue PPI  7. Return in 3 weeks for MD follow-up with CBC, CMP, continued Keytruda Alimta    Patient is on treatment requiring close monitoring for toxicities    KORI Edwards

## 2021-12-16 RX ORDER — SIMVASTATIN 20 MG
TABLET ORAL
Qty: 90 TABLET | Refills: 1 | Status: SHIPPED | OUTPATIENT
Start: 2021-12-16 | End: 2022-05-25 | Stop reason: SDUPTHER

## 2022-01-01 ENCOUNTER — TELEPHONE (OUTPATIENT)
Dept: FAMILY MEDICINE CLINIC | Facility: CLINIC | Age: 57
End: 2022-01-01

## 2022-01-03 ENCOUNTER — INFUSION (OUTPATIENT)
Dept: ONCOLOGY | Facility: HOSPITAL | Age: 57
End: 2022-01-03

## 2022-01-03 ENCOUNTER — OFFICE VISIT (OUTPATIENT)
Dept: ONCOLOGY | Facility: CLINIC | Age: 57
End: 2022-01-03

## 2022-01-03 VITALS
BODY MASS INDEX: 30.39 KG/M2 | TEMPERATURE: 97.3 F | DIASTOLIC BLOOD PRESSURE: 93 MMHG | WEIGHT: 171.5 LBS | HEART RATE: 73 BPM | OXYGEN SATURATION: 95 % | RESPIRATION RATE: 16 BRPM | SYSTOLIC BLOOD PRESSURE: 144 MMHG | HEIGHT: 63 IN

## 2022-01-03 DIAGNOSIS — E87.6 HYPOKALEMIA: ICD-10-CM

## 2022-01-03 DIAGNOSIS — C34.12 PRIMARY ADENOCARCINOMA OF UPPER LOBE OF LEFT LUNG: ICD-10-CM

## 2022-01-03 DIAGNOSIS — C34.12 PRIMARY ADENOCARCINOMA OF UPPER LOBE OF LEFT LUNG: Primary | ICD-10-CM

## 2022-01-03 LAB
ALBUMIN SERPL-MCNC: 4.1 G/DL (ref 3.5–5.2)
ALBUMIN/GLOB SERPL: 1.4 G/DL (ref 1.1–2.4)
ALP SERPL-CCNC: 118 U/L (ref 38–116)
ALT SERPL W P-5'-P-CCNC: 14 U/L (ref 0–33)
ANION GAP SERPL CALCULATED.3IONS-SCNC: 12 MMOL/L (ref 5–15)
AST SERPL-CCNC: 18 U/L (ref 0–32)
BASOPHILS # BLD AUTO: 0.07 10*3/MM3 (ref 0–0.2)
BASOPHILS NFR BLD AUTO: 1.1 % (ref 0–1.5)
BILIRUB SERPL-MCNC: 0.2 MG/DL (ref 0.2–1.2)
BUN SERPL-MCNC: 15 MG/DL (ref 6–20)
BUN/CREAT SERPL: 14.4 (ref 7.3–30)
CALCIUM SPEC-SCNC: 9.3 MG/DL (ref 8.5–10.2)
CHLORIDE SERPL-SCNC: 106 MMOL/L (ref 98–107)
CO2 SERPL-SCNC: 26 MMOL/L (ref 22–29)
CREAT SERPL-MCNC: 1.04 MG/DL (ref 0.6–1.1)
DEPRECATED RDW RBC AUTO: 52.5 FL (ref 37–54)
EOSINOPHIL # BLD AUTO: 0.41 10*3/MM3 (ref 0–0.4)
EOSINOPHIL NFR BLD AUTO: 6.3 % (ref 0.3–6.2)
ERYTHROCYTE [DISTWIDTH] IN BLOOD BY AUTOMATED COUNT: 17.7 % (ref 12.3–15.4)
GFR SERPL CREATININE-BSD FRML MDRD: 54 ML/MIN/1.73
GLOBULIN UR ELPH-MCNC: 2.9 GM/DL (ref 1.8–3.5)
GLUCOSE SERPL-MCNC: 88 MG/DL (ref 74–124)
HCT VFR BLD AUTO: 31.7 % (ref 34–46.6)
HGB BLD-MCNC: 9.8 G/DL (ref 12–15.9)
IMM GRANULOCYTES # BLD AUTO: 0.04 10*3/MM3 (ref 0–0.05)
IMM GRANULOCYTES NFR BLD AUTO: 0.6 % (ref 0–0.5)
LYMPHOCYTES # BLD AUTO: 0.7 10*3/MM3 (ref 0.7–3.1)
LYMPHOCYTES NFR BLD AUTO: 10.8 % (ref 19.6–45.3)
MCH RBC QN AUTO: 27.6 PG (ref 26.6–33)
MCHC RBC AUTO-ENTMCNC: 30.9 G/DL (ref 31.5–35.7)
MCV RBC AUTO: 89.3 FL (ref 79–97)
MONOCYTES # BLD AUTO: 0.79 10*3/MM3 (ref 0.1–0.9)
MONOCYTES NFR BLD AUTO: 12.2 % (ref 5–12)
NEUTROPHILS NFR BLD AUTO: 4.48 10*3/MM3 (ref 1.7–7)
NEUTROPHILS NFR BLD AUTO: 69 % (ref 42.7–76)
NRBC BLD AUTO-RTO: 0 /100 WBC (ref 0–0.2)
PLATELET # BLD AUTO: 365 10*3/MM3 (ref 140–450)
PMV BLD AUTO: 8.7 FL (ref 6–12)
POTASSIUM SERPL-SCNC: 3.1 MMOL/L (ref 3.5–4.7)
PROT SERPL-MCNC: 7 G/DL (ref 6.3–8)
RBC # BLD AUTO: 3.55 10*6/MM3 (ref 3.77–5.28)
SODIUM SERPL-SCNC: 144 MMOL/L (ref 134–145)
T4 FREE SERPL-MCNC: 1.42 NG/DL (ref 0.93–1.7)
TSH SERPL DL<=0.05 MIU/L-ACNC: 1.83 UIU/ML (ref 0.27–4.2)
WBC NRBC COR # BLD: 6.49 10*3/MM3 (ref 3.4–10.8)

## 2022-01-03 PROCEDURE — 84439 ASSAY OF FREE THYROXINE: CPT | Performed by: INTERNAL MEDICINE

## 2022-01-03 PROCEDURE — 96413 CHEMO IV INFUSION 1 HR: CPT

## 2022-01-03 PROCEDURE — 99214 OFFICE O/P EST MOD 30 MIN: CPT | Performed by: INTERNAL MEDICINE

## 2022-01-03 PROCEDURE — 25010000002 PEMBROLIZUMAB 100 MG/4ML SOLUTION 4 ML VIAL: Performed by: INTERNAL MEDICINE

## 2022-01-03 PROCEDURE — 85025 COMPLETE CBC W/AUTO DIFF WBC: CPT

## 2022-01-03 PROCEDURE — 80053 COMPREHEN METABOLIC PANEL: CPT

## 2022-01-03 PROCEDURE — 84443 ASSAY THYROID STIM HORMONE: CPT | Performed by: INTERNAL MEDICINE

## 2022-01-03 PROCEDURE — 96411 CHEMO IV PUSH ADDL DRUG: CPT

## 2022-01-03 PROCEDURE — 25010000002 PEMETREXED PER 10 MG: Performed by: INTERNAL MEDICINE

## 2022-01-03 RX ORDER — OXYCODONE HYDROCHLORIDE AND ACETAMINOPHEN 5; 325 MG/1; MG/1
1 TABLET ORAL EVERY 6 HOURS PRN
Qty: 120 TABLET | Refills: 0 | Status: SHIPPED | OUTPATIENT
Start: 2022-01-03 | End: 2022-07-18 | Stop reason: SDUPTHER

## 2022-01-03 RX ORDER — SODIUM CHLORIDE 9 MG/ML
250 INJECTION, SOLUTION INTRAVENOUS ONCE
Status: COMPLETED | OUTPATIENT
Start: 2022-01-03 | End: 2022-01-03

## 2022-01-03 RX ORDER — SODIUM CHLORIDE 9 MG/ML
250 INJECTION, SOLUTION INTRAVENOUS ONCE
Status: CANCELLED | OUTPATIENT
Start: 2022-01-03

## 2022-01-03 RX ORDER — POTASSIUM CHLORIDE 20 MEQ/1
20 TABLET, EXTENDED RELEASE ORAL
Status: COMPLETED | OUTPATIENT
Start: 2022-01-03 | End: 2022-01-03

## 2022-01-03 RX ADMIN — POTASSIUM CHLORIDE 20 MEQ: 20 TABLET, EXTENDED RELEASE ORAL at 10:10

## 2022-01-03 RX ADMIN — SODIUM CHLORIDE 250 ML: 9 INJECTION, SOLUTION INTRAVENOUS at 09:33

## 2022-01-03 RX ADMIN — SODIUM CHLORIDE 930 MG: 900 INJECTION INTRAVENOUS at 10:03

## 2022-01-03 RX ADMIN — POTASSIUM CHLORIDE 20 MEQ: 20 TABLET, EXTENDED RELEASE ORAL at 09:09

## 2022-01-03 RX ADMIN — SODIUM CHLORIDE 200 MG: 900 INJECTION INTRAVENOUS at 09:33

## 2022-01-03 NOTE — PROGRESS NOTES
Subjective   Holli Patel is a 56 y.o. female.  Referred by Dr. 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    3/1/2021-bilateral screening mammogram-microcalcifications seen in the posterior one third of the lateral aspect of the right breast.  Area of focal asymmetry seen in the middle one third of the upper inner quadrant of the left breast.    3/1/2021-DEXA scan-T score of -2.2 on the lumbar spine and T score of -2.3 in the left hip and T score of -1.9 in the right hip.  Findings consistent with osteopenia    3/23/2021-screening lung CT-there is a 10 x 11 mm solid nodule in the left upper lobe.  Enlarged AP window lymph node measuring 14 x 10 mm.  Suggestion of a possible 9 mm left hilar lymph node.  Heavy coronary artery calcification.    3/23/2021-diagnostic mammogram bilateral-cluster of microcalcifications in the middle third lateral aspect of the right breast.  Left breast demonstrates persistence of the area of focal asymmetry in the region.    Ultrasound-left breast ultrasound at 10 o'clock position, 6 cm from the nipple there is a 0.4 cm irregular hypoechoic lesion.  Stereotactic biopsy of the right breast calcifications recommended.  Ultrasound-guided biopsy of the left breast lesion recommended    4/7/2021-right breast stereotactic biopsy and left breast ultrasound-guided biopsy  Pathology  Right breast-invasive ductal carcinoma, grade 2, lymphovascular invasion present, ER +99% strong, RI +80% moderate, HER-2 negative, Ki-67 12%  Left breast upper inner quadrant 10 o'clock position biopsy, invasive ductal carcinoma, grade   2, ER +99% strong, RI +40% strong, HER-2 2+ on immunohistochemistry, nonamplified on FISH Ki-67 10%    4/14/2021-PET/CT-FDG avid aortopulmonary window lymph node measures 0.9 cm with an SUV of 7.5.  FDG avid left hilar lymph node  measures 1 cm with an SUV of 17.2.  Irregular soft tissue density in the right breast measuring 3.7 x 3.8 cm is favored to be secondary to the recent breast biopsy.  1.1 cm pulmonary nodule within the left upper lobe with SUV 9.7 .  Sub-6 mm pulmonary nodules are present in the right lower lobe.  No evidence of lymphadenopathy or metastatic disease in the abdomen.  Focal area of FDG uptake within the central canal posterior to T11-T12 displaced demonstrating an SUV of 5.5 thought to be reactive however MRI is recommended for further evaluation.    She was seen by Dr. Molina who initially planned for breast surgery however  due to the PET abnormalities she has been referred to Dr. Kerr who plans to do a wound on 4/30/2021.  Dr. Molina's and Dr. Kerr's notes reviewed.    Patient denies any family history of breast cancer.  Her mother had lymphoma.  Maternal grandmother had colon cancer.  Prior to that screening mammogram she did not have any palpable abnormalities of either breast.    She has been a heavy smoker for about 40 years and smoked 2 packs/day.  Denies any recent weight changes, new bone pains, cough, abdominal pain nausea vomiting constipation or diarrhea.  She does have anxiety and has been on several medications.  She has recently been started on Xanax to help with the mood and also with insomnia.    Patient had a video-assisted thoracoscopy and mediastinal lymphadenectomy on 4/30/2021.  L5-L6 lymph nodes were biopsied however the small pulmonary nodule in the left upper lobe was not difficult to find.  Pathology showed moderately differentiated adenocarcinoma which is CK7 and TTF-1 positive.  Consistent with lung primary.  Ki-67 85%.    5/14/2021-MRI of the brain-minimal chronic small vessel ischemic change in the white matter.  Otherwise negative MRI.    5/20/2021-bilateral axillary ultrasound-no evidence of axillary lymphadenopathy in either axilla.  Normal-appearing bilateral axillary lymph  nodes visualized.    Cycle 1 cisplatin and Alimta on 5/25/2021.    Cycle 2 cisplatin Alimta 6/15/2021    Cycle 3 cisplatin Alimta 7/7/2021    Completed radiation on 7/12/2021    Port was nonfunctional hence a port study was performed which showed that the port was backed up into the innominate vein and also there was a nonocclusive thrombus at the end of the catheter extending into the superior vena cava.  She was started on anticoagulation with Eliquis.  It was recommended for port revision of the intention is to keep the port.    PET/CT 8/5/2021-images independently reviewed and interpreted by me-decrease in size and FDG uptake of the left upper lobe pulmonary nodule as well as mediastinal and left hilar lymphadenopathy representing response to treatment.  Sub-6 mm pulmonary nodule in the left upper lobe new since 4/14/2021.  This could be related to radiation however follow-up CT in 3 months recommended.  Decrease in size of the irregular masslike tissue in the right breast which is postbiopsy hematoma.  This is not PET avid.  New segmental left eighth rib fractures healing.  A new band of sclerosis of the left seventh rib which favored to represent healing nondisplaced fracture.  Follow-up CT recommended.  focal uptake in the duodenum first and second portions.  Could be related to duodenitis.  Indeterminate lesion in the L1 vertebral body not well evaluated on PET/CT.    10/1/2021-MRI of the lumbar spine.  Lesion in the left posterior body of L1 measures 14 x 14 x 12 mm and previously 5 x 5 x 6 mm.  The interval enlargement strongly suggest that it is metastatic lesion.  No additional osseous metastasis noted.   Other chronic changes noted.    10/14/2021-biopsy of the lumbar spine lesion-pathology consistent with poorly differentiated carcinoma.  The staining is similar to the prior tumors and fevers this being metastatic poorly differentiated pulmonary adenocarcinoma.    10/26/2021-brain MRI-no evidence of  metastatic disease.    10/26/2021-bilateral diagnostic mammogram and ultrasound  Scattered fibroglandular density in both breast.  Postbiopsy hematoma with internal biopsy clip in the upper outer quadrant of the right breast, 8 cm from the nipple.  The hematoma size is decreased to 2.9 cm from 3.6 cm earlier.    Left breast-parenchymal density measuring 9 x 10 mm has markedly decreased.  Few adjacent calcifications which are unchanged.  No new abnormality in the left breast.  At 10:00 region there is some minimal adjacent hypoechoic texture which is difficult to measure but appears smaller since the previous exam.    10/28/2021-PET/CT  New L1 and L2 lytic bone metastasis annually intensely hypermetabolic focus at the left adrenal gland likely represents metastatic disease as well.  Slight decrease in size of the 0.9 x 0.7 cm left upper lobe pulmonary nodule.  There is hypermetabolic activity related to radiation pneumonitis.  The remainder of the nodule is photopenic.  Uncertain etiology of the more intense activity along the right lateral peripheral margin of the 2.6 cm postbiopsy density of the right breast.    She completed radiation to to the lumbar spine on 11/19/2021 11/22/2021-cycle 1 Alimta and Keytruda    Interval history:   The patient is a 56 y.o. female the above-mentioned street, who returns the office today in anticipation of her second cycle of Keytruda Alimta.  She is scheduled for cycle 3 of Keytruda and Alimta.  She has had a steady weight loss and has lost about 10 pounds in the past month and a half.  She tells me that her home situation is fairly stressful with her mother having dementia and having a UTI.  Since then patient has been very anxious and reports a very poor appetite.  She was prescribed Zyprexa at her last visit however she has not been taking that.  She has not had a follow-up with Bina.  She was seen by Chayito Quezada our  at her last visit and patient ports that being  helpful.  Denies any trouble breathing.  She is currently using Percocet for her pain.  Completed radiation to the adrenal gland.      The following portions of the patient's history were reviewed and updated as appropriate: allergies, current medications, past family history, past medical history, past social history, past surgical history and problem list.    Past Medical History:   Diagnosis Date   • Anxiety    • Clot     POSSIBLE BLOOD CLOT IN VENOUS ACCESS DEVICE-PT STATES WAS STARTED ON ELIQUIS    • Dyspnea on exertion    • Elevated cholesterol    • Frequent urination     DAY AND NIGHT   • SHAYY (generalized anxiety disorder)     RELATED HIGH BLOOD PRESSURE   • GERD (gastroesophageal reflux disease)    • High blood pressure     ANXIETY RELATED   • Hyperlipidemia    • Hypothyroidism    • Lung cancer (HCC)    • Lung nodule     LEFT   • Malignant neoplasm of upper-outer quadrant of right breast in female, estrogen receptor positive (HCC) 4/13/2021    PT STATES HAS BILAT BREAST CANCER   • Migraine    • PONV (postoperative nausea and vomiting)         Past Surgical History:   Procedure Laterality Date   • BREAST BIOPSY Bilateral 04/07/2021    Right Breast 10 o'clock & Left breast 10 o'clock 6 cm from nipple, [facility]   • CLOSED REDUCTION HIP DISLOCATION Left 4/30/2021    Procedure: BRONCHOSCOPY, LEFT VIDEO ASSITED THORACOSCOPY, ROBOTIC ASSSITED MEDIASTINAL LYMPHADENECTOMY WITH INTERCOSTAL NERVE BLOCKS;  Surgeon: Raphael Kerr III, MD;  Location: [facility] OR;  Service: DaVinci;  Laterality: Left;   • COLONOSCOPY     • TUBAL ABDOMINAL LIGATION     • VENOUS ACCESS DEVICE (PORT) INSERTION Right 5/20/2021    Procedure: INSERTION VENOUS ACCESS DEVICE;  Surgeon: Raphael Kerr III, MD;  Location: [facility] OR;  Service: Thoracic;  Laterality: Right;   • VENOUS ACCESS DEVICE (PORT) INSERTION Right 11/29/2021    Procedure: REVISION AND REPLACEMENT RIGHT SUBCLAVIAN VENOUS ACCESS PORT;  Surgeon: Raphael Kerr III  "MD;  Location: [facility] OR;  Service: Thoracic;  Laterality: Right;   • WRIST SURGERY Right         Family History   Problem Relation Age of Onset   • Cancer Father         LYMPHATIC   • Prostate cancer Father    • Lymphoma Father    • Alcohol abuse Brother    • Colon cancer Maternal Grandmother    • Malig Hyperthermia Neg Hx         Social History     Socioeconomic History   • Marital status:      Spouse name: Jelani   Tobacco Use   • Smoking status: Former Smoker     Packs/day: 1.00     Years: 30.00     Pack years: 30.00     Types: Electronic Cigarette, Cigarettes     Start date: 1981     Quit date:      Years since quittin.0   • Smokeless tobacco: Never Used   • Tobacco comment: ABT 15 CIGARETTES DAILY   Vaping Use   • Vaping Use: Some days   • Substances: Nicotine, Flavoring   • Devices: Disposable   Substance and Sexual Activity   • Alcohol use: Never   • Drug use: Never   • Sexual activity: Yes     Partners: Male        OB History        2    Para   2    Term   2            AB        Living           SAB        IAB        Ectopic        Molar        Multiple        Live Births                Age of menarche-10  Age at menopause-42  Oral contraceptive pill use-15 years  No HRT use  She has 2 children  Age at first live childbirth-17    No Known Allergies     Review of Systems   Review of systems as mentioned in the HPI    Objective   Blood pressure 144/93, pulse 73, temperature 97.3 °F (36.3 °C), temperature source Temporal, resp. rate 16, height 160 cm (62.99\"), weight 77.8 kg (171 lb 8 oz), SpO2 95 %, not currently breastfeeding.       Physical Exam  Vitals reviewed.   HENT:      Head: Normocephalic.   Eyes:      Extraocular Movements: Extraocular movements intact.      Conjunctiva/sclera: Conjunctivae normal.      Pupils: Pupils are equal, round, and reactive to light.   Cardiovascular:      Rate and Rhythm: Normal rate and regular rhythm.      Pulses: Normal pulses.      " Heart sounds: Normal heart sounds.   Pulmonary:      Effort: Pulmonary effort is normal.      Breath sounds: Normal breath sounds.   Abdominal:      General: Abdomen is flat. Bowel sounds are normal. There is no distension.      Palpations: There is no mass.   Musculoskeletal:         General: No swelling. Normal range of motion.      Cervical back: Normal range of motion.   Skin:     General: Skin is warm.   Neurological:      General: No focal deficit present.      Mental Status: She is alert and oriented to person, place, and time.   Psychiatric:         Mood and Affect: Mood normal.         Behavior: Behavior normal.     Breast Exam: Right breast with a large postbiopsy hematoma in the upper outer quadrant measuring 4 cm.    No other palpable abnormalities.  Left breast with no palpable abnormalities.  No right or left axillary lymphadenopathy(breast not examined today, 1/3/2022)    I have reexamined the patient and the results are consistent with the previously documented exam. Salma Snell MD     Results from last 7 days   Lab Units 01/03/22  0749   WBC 10*3/mm3 6.49   NEUTROS ABS 10*3/mm3 4.48   HEMOGLOBIN g/dL 9.8*   HEMATOCRIT % 31.7*   PLATELETS 10*3/mm3 365               No radiology results for the last 30 days.    Assessment/Plan      *Bilateral breast cancer  · Invasive ductal carcinoma in both right and left breast and lesions measure under 1 cm.  No palpable lymphadenopathy although unsure if this has been evaluated by an axillary ultrasound.  No abnormal axillary lymphadenopathy noted on PET.  · Most likely clinical T1b N0 M0, both cancers were noted to be grade 2, ER/NH positive and HER-2 negative and Ki-67 less than 15%  VATS on 4/30/2021  Biopsy confirms adenocarcinoma of pulmonary primary  Discussed with Dr. Molina about delaying breast surgery and she is in agreement  Darted on neoadjuvant anastrozole  · Patient experienced significant adverse effects with anastrozole  · Treatment changed  to letrozole   · Axillary ultrasound 4/20/2021 -negative for any axillary lymphadenopathy  · Started letrozole in May 2021  · She continues on letrozole without any problems.  · 8/24/2021 bilateral diagnostic mammogram and ultrasound.  In the left breast the tumor now measures 1 cm on mammogram as opposed to 0.8 cm in March.  Right breast calcifications also noted to be in a large extent although the number seems to be decreased.  Hematoma has decreased in size.  · 10/26/2021-bilateral diagnostic mammogram and ultrasound-decrease in size of the bilateral breast lesions.  · 10/26/2021-PET/CT-metastatic lesions in the left adrenal gland, L1-L2.  · 10/26/2021-MRI of the brain-no evidence of metastatic disease  · Since the breast lesions are decreasing in size, we will continue with letrozole.  · We previously discussed changing treatment to exemestane however since the most recent mammogram and ultrasound shows decrease in the breast lesions I think it is reasonable to proceed with letrozole.  · We will hold off on lumpectomy at this time to see what the course of lung cancer would be.  · Remains compliant on letrozole, continue the same      *Adenocarcinoma of the left lung, metastatic  · Most likely stage T1 N2 M0, stage III  · MRI of the thoracic spine ordered to further evaluate the abnormality seen on PET  · MRI of the brain negative   · Given that she is only 55 and fairly good performance status I discussed with her by with concurrent chemoradiation with cisplatin and Alimta every 3 weeks followed by durvalumab.  · Adverse effects including but not limited to myelosuppression, increased risk of infections, nausea, vomiting, renal dysfunction, ototoxicity, neurotoxicity have been discussed at length.  · MRI of the thoracic spine performed 5/24/2021 shows no evidence of metastatic disease in the thoracic spine however in L1 there is a 7 mm lesion which is indeterminate.  A lumbar spine MRI has been  recommended.  · Discussed her case with Dr. Oates and he feels like MRI of the lumbar spine may also show that this lesion is indeterminate.  · Discussed the same with the patient and her .  · Even if this is metastatic disease this might be the only lesion of metastatic disease and then would consider this oligometastatic disease.  · Therefore plan to proceed with concurrent chemoradiation as scheduled.  · cisplatin and Alimta, C1D1 5/25/2021  · Day 1 of radiation 5/25/2021  · 5/27/2021-MRI of the lumbar spine-there is a 6 x 5 x 6 mm enhancing lesion in the left posterior body of L1 vertebra.  This is considered indeterminate.  Follow-up recommended.  Degenerative changes noted at L5-S1  · 7/7/2021, cycle 3 cisplatin Alimta  · 7/12/2021-radiation completed  · PET/CT 8/6/2021 with good response to chemoradiation.  · MRI of the lumbar spine 10/1/2021 with increase in size of the L1 lesion which now measures 14 mm as opposed to 6 mm in May 2021.  · 10/14/2021-biopsy of the L1 lesion and pathology consistent with adenocarcinoma of the lung, TPS 0  · 0/26/2021-PET/CT-metastatic lesions in the left adrenal gland, L1-L2.  · 10/26/2021-MRI of the brain-no evidence of metastatic disease  · Given that she only has metastatic disease in L1-L2 and left adrenal gland I think it would be possible to radiate all these regions.  · Completed radiation to L1-L2 on 11/19/2021  · 11/22/2021-cycle 1 of Keytruda and Alimta  · Completed radiation to the left adrenal gland  · 1/3/2022-cycle 3 of Keytruda and Alimta  · CBC and CMP reviewed and stable  · Proceed with Keytruda and Alimta  · PET/CT prior to follow-up in 3 weeks    *Bilateral breast cancer-no family history of breast cancer but given synchronous breast cancer genetic testing has been sent.  Genetic testing with a variant of unknown significance.    *Right hand numbness and difficulty with grasping objects  · Previously had history of carpal tunnel requiring  repair.  · She is currently on letrozole which could cause worsening of the carpal tunnel  · Gabapentin dose will be increased to 600 mg 3 times daily  · She will also be referred to hand surgery for further evaluation.  · She now reports bilateral upper extremity numbness.  · Continue gabapentin  · MRI of the brain 10/26/2021 without any evidence of metastatic disease  · Not an issue today    *Anxiety-  · Currently on Xanax 1 mg nightly  · She required Valium for the MRI as she had claustrophobia and a panic attack  · Remains anxious secondary to recent progression of metastatic lung cancer  · Anxiety fairly uncontrolled  · Recommend starting Zyprexa  · Recommend follow-up with supportive oncology  · She was seen by our  last visit    *Left-sided chest wall pain-secondary to VATS.  Most likely neuropathic.  Continue gabapentin and Norco as needed.  She ran out of gabapentin and experienced leg cramps.    Continue gabapentin      *Hypertension-continue lisinopril.  Blood pressure 101/70    *Smoking-not addressed today    *GERD  · 7/7/2021, patient complains of reflux despite the use of Prilosec 20 mg twice daily.  We will send her a prescription for Carafate slurry 4 times daily.  · She has not quite started using the Carafate.  · Continue Prilosec and Carafate  · PET/CT shows duodenitis      *Port not returning blood    · A new Mediport was placed by thoracic surgery 11/29/2021, Eliquis has been discontinued and right chest Mediport is now safe to use.    PLAN:  1. Proceed today with cycle 3 Keytruda Alimta  2. Continue letrozole 2.5 mg daily   3. Begin olanzapine 5 mg nightly  4. Follow-up with supportive oncology  5. B12 injection  6. Continue PPI  7. Return in 3 weeks with CBC CMP PET/CT prior to follow-up.    Patient is on treatment requiring close monitoring for toxicities    Salma Snell MD

## 2022-01-05 RX ORDER — RIZATRIPTAN BENZOATE 10 MG/1
TABLET ORAL
Qty: 12 TABLET | Refills: 5 | Status: SHIPPED | OUTPATIENT
Start: 2022-01-05 | End: 2022-05-25 | Stop reason: SDUPTHER

## 2022-01-06 ENCOUNTER — TELEPHONE (OUTPATIENT)
Dept: PSYCHIATRY | Facility: HOSPITAL | Age: 57
End: 2022-01-06

## 2022-01-06 NOTE — TELEPHONE ENCOUNTER
Supportive Oncology Services    Call placed to patient regarding impending weather, offered to transition visit to Zoom.  Patient reports attempt to cancel appointment via automated reminder system.  Does not want Zoom visit.  Agrees to call clinic to reschedule.

## 2022-01-11 DIAGNOSIS — Z17.0 MALIGNANT NEOPLASM OF UPPER-OUTER QUADRANT OF RIGHT BREAST IN FEMALE, ESTROGEN RECEPTOR POSITIVE: ICD-10-CM

## 2022-01-11 DIAGNOSIS — C50.411 MALIGNANT NEOPLASM OF UPPER-OUTER QUADRANT OF RIGHT BREAST IN FEMALE, ESTROGEN RECEPTOR POSITIVE: ICD-10-CM

## 2022-01-11 DIAGNOSIS — C34.12 PRIMARY ADENOCARCINOMA OF UPPER LOBE OF LEFT LUNG: Primary | ICD-10-CM

## 2022-01-12 RX ORDER — FLUOXETINE HYDROCHLORIDE 40 MG/1
CAPSULE ORAL
Qty: 90 CAPSULE | Refills: 1 | Status: SHIPPED | OUTPATIENT
Start: 2022-01-12 | End: 2022-07-15

## 2022-01-13 ENCOUNTER — LAB (OUTPATIENT)
Dept: LAB | Facility: HOSPITAL | Age: 57
End: 2022-01-13

## 2022-01-13 DIAGNOSIS — C34.12 PRIMARY ADENOCARCINOMA OF UPPER LOBE OF LEFT LUNG: ICD-10-CM

## 2022-01-13 DIAGNOSIS — Z17.0 MALIGNANT NEOPLASM OF UPPER-OUTER QUADRANT OF RIGHT BREAST IN FEMALE, ESTROGEN RECEPTOR POSITIVE: ICD-10-CM

## 2022-01-13 DIAGNOSIS — C50.411 MALIGNANT NEOPLASM OF UPPER-OUTER QUADRANT OF RIGHT BREAST IN FEMALE, ESTROGEN RECEPTOR POSITIVE: ICD-10-CM

## 2022-01-18 ENCOUNTER — HOSPITAL ENCOUNTER (OUTPATIENT)
Dept: PET IMAGING | Facility: HOSPITAL | Age: 57
Discharge: HOME | End: 2022-01-18

## 2022-01-18 DIAGNOSIS — C34.12 PRIMARY ADENOCARCINOMA OF UPPER LOBE OF LEFT LUNG: ICD-10-CM

## 2022-01-18 LAB — GLUCOSE BLDC GLUCOMTR-MCNC: 97 MG/DL (ref 70–130)

## 2022-01-18 PROCEDURE — 82962 GLUCOSE BLOOD TEST: CPT

## 2022-01-18 PROCEDURE — 0 FLUDEOXYGLUCOSE F18 SOLUTION: Performed by: INTERNAL MEDICINE

## 2022-01-18 PROCEDURE — A9552 F18 FDG: HCPCS | Performed by: INTERNAL MEDICINE

## 2022-01-18 PROCEDURE — 78815 PET IMAGE W/CT SKULL-THIGH: CPT

## 2022-01-18 RX ADMIN — FLUDEOXYGLUCOSE F18 1 DOSE: 300 INJECTION INTRAVENOUS at 08:47

## 2022-01-19 DIAGNOSIS — C34.12 PRIMARY ADENOCARCINOMA OF UPPER LOBE OF LEFT LUNG: Primary | ICD-10-CM

## 2022-01-21 LAB — REF LAB TEST METHOD: NORMAL

## 2022-01-24 ENCOUNTER — INFUSION (OUTPATIENT)
Dept: ONCOLOGY | Facility: HOSPITAL | Age: 57
End: 2022-01-24

## 2022-01-24 ENCOUNTER — OFFICE VISIT (OUTPATIENT)
Dept: ONCOLOGY | Facility: CLINIC | Age: 57
End: 2022-01-24

## 2022-01-24 VITALS
WEIGHT: 173.3 LBS | RESPIRATION RATE: 16 BRPM | SYSTOLIC BLOOD PRESSURE: 136 MMHG | BODY MASS INDEX: 30.71 KG/M2 | DIASTOLIC BLOOD PRESSURE: 87 MMHG | OXYGEN SATURATION: 97 % | HEIGHT: 63 IN | TEMPERATURE: 97.1 F | HEART RATE: 92 BPM

## 2022-01-24 DIAGNOSIS — C34.12 PRIMARY ADENOCARCINOMA OF UPPER LOBE OF LEFT LUNG: Primary | ICD-10-CM

## 2022-01-24 DIAGNOSIS — Z79.899 HIGH RISK MEDICATION USE: ICD-10-CM

## 2022-01-24 DIAGNOSIS — C34.90 NON-SMALL CELL LUNG CANCER METASTATIC TO BONE: ICD-10-CM

## 2022-01-24 DIAGNOSIS — C50.411 MALIGNANT NEOPLASM OF UPPER-OUTER QUADRANT OF RIGHT BREAST IN FEMALE, ESTROGEN RECEPTOR POSITIVE: ICD-10-CM

## 2022-01-24 DIAGNOSIS — C79.51 NON-SMALL CELL LUNG CANCER METASTATIC TO BONE: ICD-10-CM

## 2022-01-24 DIAGNOSIS — Z17.0 MALIGNANT NEOPLASM OF UPPER-OUTER QUADRANT OF RIGHT BREAST IN FEMALE, ESTROGEN RECEPTOR POSITIVE: ICD-10-CM

## 2022-01-24 DIAGNOSIS — C34.12 PRIMARY ADENOCARCINOMA OF UPPER LOBE OF LEFT LUNG: ICD-10-CM

## 2022-01-24 LAB
ALBUMIN SERPL-MCNC: 4 G/DL (ref 3.5–5.2)
ALBUMIN/GLOB SERPL: 1.4 G/DL (ref 1.1–2.4)
ALP SERPL-CCNC: 109 U/L (ref 38–116)
ALT SERPL W P-5'-P-CCNC: 24 U/L (ref 0–33)
ANION GAP SERPL CALCULATED.3IONS-SCNC: 11.5 MMOL/L (ref 5–15)
AST SERPL-CCNC: 29 U/L (ref 0–32)
BASOPHILS # BLD AUTO: 0.09 10*3/MM3 (ref 0–0.2)
BASOPHILS NFR BLD AUTO: 1.2 % (ref 0–1.5)
BILIRUB SERPL-MCNC: <0.2 MG/DL (ref 0.2–1.2)
BUN SERPL-MCNC: 12 MG/DL (ref 6–20)
BUN/CREAT SERPL: 12.1 (ref 7.3–30)
CALCIUM SPEC-SCNC: 9.1 MG/DL (ref 8.5–10.2)
CHLORIDE SERPL-SCNC: 106 MMOL/L (ref 98–107)
CO2 SERPL-SCNC: 26.5 MMOL/L (ref 22–29)
CREAT SERPL-MCNC: 0.99 MG/DL (ref 0.6–1.1)
DEPRECATED RDW RBC AUTO: 66.2 FL (ref 37–54)
EOSINOPHIL # BLD AUTO: 0.51 10*3/MM3 (ref 0–0.4)
EOSINOPHIL NFR BLD AUTO: 6.8 % (ref 0.3–6.2)
ERYTHROCYTE [DISTWIDTH] IN BLOOD BY AUTOMATED COUNT: 20.4 % (ref 12.3–15.4)
GFR SERPL CREATININE-BSD FRML MDRD: 58 ML/MIN/1.73
GLOBULIN UR ELPH-MCNC: 2.9 GM/DL (ref 1.8–3.5)
GLUCOSE SERPL-MCNC: 105 MG/DL (ref 74–124)
HCT VFR BLD AUTO: 31.3 % (ref 34–46.6)
HGB BLD-MCNC: 9.6 G/DL (ref 12–15.9)
IMM GRANULOCYTES # BLD AUTO: 0.08 10*3/MM3 (ref 0–0.05)
IMM GRANULOCYTES NFR BLD AUTO: 1.1 % (ref 0–0.5)
LYMPHOCYTES # BLD AUTO: 0.74 10*3/MM3 (ref 0.7–3.1)
LYMPHOCYTES NFR BLD AUTO: 9.9 % (ref 19.6–45.3)
MCH RBC QN AUTO: 28.2 PG (ref 26.6–33)
MCHC RBC AUTO-ENTMCNC: 30.7 G/DL (ref 31.5–35.7)
MCV RBC AUTO: 92.1 FL (ref 79–97)
MONOCYTES # BLD AUTO: 0.87 10*3/MM3 (ref 0.1–0.9)
MONOCYTES NFR BLD AUTO: 11.6 % (ref 5–12)
NEUTROPHILS NFR BLD AUTO: 5.21 10*3/MM3 (ref 1.7–7)
NEUTROPHILS NFR BLD AUTO: 69.4 % (ref 42.7–76)
NRBC BLD AUTO-RTO: 0 /100 WBC (ref 0–0.2)
PLATELET # BLD AUTO: 354 10*3/MM3 (ref 140–450)
PMV BLD AUTO: 8.7 FL (ref 6–12)
POTASSIUM SERPL-SCNC: 3.3 MMOL/L (ref 3.5–4.7)
PROT SERPL-MCNC: 6.9 G/DL (ref 6.3–8)
RBC # BLD AUTO: 3.4 10*6/MM3 (ref 3.77–5.28)
SODIUM SERPL-SCNC: 144 MMOL/L (ref 134–145)
WBC NRBC COR # BLD: 7.5 10*3/MM3 (ref 3.4–10.8)

## 2022-01-24 PROCEDURE — 85025 COMPLETE CBC W/AUTO DIFF WBC: CPT

## 2022-01-24 PROCEDURE — 96413 CHEMO IV INFUSION 1 HR: CPT

## 2022-01-24 PROCEDURE — 25010000002 PEMBROLIZUMAB 100 MG/4ML SOLUTION 4 ML VIAL: Performed by: INTERNAL MEDICINE

## 2022-01-24 PROCEDURE — 25010000002 PEMETREXED PER 10 MG: Performed by: INTERNAL MEDICINE

## 2022-01-24 PROCEDURE — 36415 COLL VENOUS BLD VENIPUNCTURE: CPT

## 2022-01-24 PROCEDURE — 96411 CHEMO IV PUSH ADDL DRUG: CPT

## 2022-01-24 PROCEDURE — 99215 OFFICE O/P EST HI 40 MIN: CPT | Performed by: INTERNAL MEDICINE

## 2022-01-24 PROCEDURE — 80053 COMPREHEN METABOLIC PANEL: CPT

## 2022-01-24 RX ORDER — SODIUM CHLORIDE 9 MG/ML
250 INJECTION, SOLUTION INTRAVENOUS ONCE
Status: COMPLETED | OUTPATIENT
Start: 2022-01-24 | End: 2022-01-24

## 2022-01-24 RX ORDER — SODIUM CHLORIDE 9 MG/ML
250 INJECTION, SOLUTION INTRAVENOUS ONCE
Status: CANCELLED | OUTPATIENT
Start: 2022-01-24

## 2022-01-24 RX ORDER — PROCHLORPERAZINE MALEATE 10 MG
10 TABLET ORAL EVERY 6 HOURS PRN
Qty: 60 TABLET | Refills: 3 | Status: SHIPPED | OUTPATIENT
Start: 2022-01-24 | End: 2022-02-16

## 2022-01-24 RX ADMIN — SODIUM CHLORIDE 930 MG: 9 INJECTION, SOLUTION INTRAVENOUS at 14:32

## 2022-01-24 RX ADMIN — SODIUM CHLORIDE 200 MG: 9 INJECTION, SOLUTION INTRAVENOUS at 14:00

## 2022-01-24 RX ADMIN — SODIUM CHLORIDE 250 ML: 9 INJECTION, SOLUTION INTRAVENOUS at 14:00

## 2022-01-24 NOTE — PROGRESS NOTES
Subjective   Holli Patel is a 56 y.o. female.  Referred by Dr. 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    3/1/2021-bilateral screening mammogram-microcalcifications seen in the posterior one third of the lateral aspect of the right breast.  Area of focal asymmetry seen in the middle one third of the upper inner quadrant of the left breast.    3/1/2021-DEXA scan-T score of -2.2 on the lumbar spine and T score of -2.3 in the left hip and T score of -1.9 in the right hip.  Findings consistent with osteopenia    3/23/2021-screening lung CT-there is a 10 x 11 mm solid nodule in the left upper lobe.  Enlarged AP window lymph node measuring 14 x 10 mm.  Suggestion of a possible 9 mm left hilar lymph node.  Heavy coronary artery calcification.    3/23/2021-diagnostic mammogram bilateral-cluster of microcalcifications in the middle third lateral aspect of the right breast.  Left breast demonstrates persistence of the area of focal asymmetry in the region.    Ultrasound-left breast ultrasound at 10 o'clock position, 6 cm from the nipple there is a 0.4 cm irregular hypoechoic lesion.  Stereotactic biopsy of the right breast calcifications recommended.  Ultrasound-guided biopsy of the left breast lesion recommended    4/7/2021-right breast stereotactic biopsy and left breast ultrasound-guided biopsy  Pathology  Right breast-invasive ductal carcinoma, grade 2, lymphovascular invasion present, ER +99% strong, ID +80% moderate, HER-2 negative, Ki-67 12%  Left breast upper inner quadrant 10 o'clock position biopsy, invasive ductal carcinoma, grade   2, ER +99% strong, ID +40% strong, HER-2 2+ on immunohistochemistry, nonamplified on FISH Ki-67 10%    4/14/2021-PET/CT-FDG avid aortopulmonary window lymph node measures 0.9 cm with an SUV of 7.5.  FDG avid left hilar lymph node  measures 1 cm with an SUV of 17.2.  Irregular soft tissue density in the right breast measuring 3.7 x 3.8 cm is favored to be secondary to the recent breast biopsy.  1.1 cm pulmonary nodule within the left upper lobe with SUV 9.7 .  Sub-6 mm pulmonary nodules are present in the right lower lobe.  No evidence of lymphadenopathy or metastatic disease in the abdomen.  Focal area of FDG uptake within the central canal posterior to T11-T12 displaced demonstrating an SUV of 5.5 thought to be reactive however MRI is recommended for further evaluation.    She was seen by Dr. Molina who initially planned for breast surgery however  due to the PET abnormalities she has been referred to Dr. Kerr who plans to do a wound on 4/30/2021.  Dr. Molina's and Dr. Kerr's notes reviewed.    Patient denies any family history of breast cancer.  Her mother had lymphoma.  Maternal grandmother had colon cancer.  Prior to that screening mammogram she did not have any palpable abnormalities of either breast.    She has been a heavy smoker for about 40 years and smoked 2 packs/day.  Denies any recent weight changes, new bone pains, cough, abdominal pain nausea vomiting constipation or diarrhea.  She does have anxiety and has been on several medications.  She has recently been started on Xanax to help with the mood and also with insomnia.    Patient had a video-assisted thoracoscopy and mediastinal lymphadenectomy on 4/30/2021.  L5-L6 lymph nodes were biopsied however the small pulmonary nodule in the left upper lobe was not difficult to find.  Pathology showed moderately differentiated adenocarcinoma which is CK7 and TTF-1 positive.  Consistent with lung primary.  Ki-67 85%.    5/14/2021-MRI of the brain-minimal chronic small vessel ischemic change in the white matter.  Otherwise negative MRI.    5/20/2021-bilateral axillary ultrasound-no evidence of axillary lymphadenopathy in either axilla.  Normal-appearing bilateral axillary lymph  nodes visualized.    Cycle 1 cisplatin and Alimta on 5/25/2021.    Cycle 2 cisplatin Alimta 6/15/2021    Cycle 3 cisplatin Alimta 7/7/2021    Completed radiation on 7/12/2021    Port was nonfunctional hence a port study was performed which showed that the port was backed up into the innominate vein and also there was a nonocclusive thrombus at the end of the catheter extending into the superior vena cava.  She was started on anticoagulation with Eliquis.  It was recommended for port revision of the intention is to keep the port.    PET/CT 8/5/2021-images independently reviewed and interpreted by me-decrease in size and FDG uptake of the left upper lobe pulmonary nodule as well as mediastinal and left hilar lymphadenopathy representing response to treatment.  Sub-6 mm pulmonary nodule in the left upper lobe new since 4/14/2021.  This could be related to radiation however follow-up CT in 3 months recommended.  Decrease in size of the irregular masslike tissue in the right breast which is postbiopsy hematoma.  This is not PET avid.  New segmental left eighth rib fractures healing.  A new band of sclerosis of the left seventh rib which favored to represent healing nondisplaced fracture.  Follow-up CT recommended.  focal uptake in the duodenum first and second portions.  Could be related to duodenitis.  Indeterminate lesion in the L1 vertebral body not well evaluated on PET/CT.    10/1/2021-MRI of the lumbar spine.  Lesion in the left posterior body of L1 measures 14 x 14 x 12 mm and previously 5 x 5 x 6 mm.  The interval enlargement strongly suggest that it is metastatic lesion.  No additional osseous metastasis noted.   Other chronic changes noted.    10/14/2021-biopsy of the lumbar spine lesion-pathology consistent with poorly differentiated carcinoma.  The staining is similar to the prior tumors and fevers this being metastatic poorly differentiated pulmonary adenocarcinoma.    10/26/2021-brain MRI-no evidence of  metastatic disease.    10/26/2021-bilateral diagnostic mammogram and ultrasound  Scattered fibroglandular density in both breast.  Postbiopsy hematoma with internal biopsy clip in the upper outer quadrant of the right breast, 8 cm from the nipple.  The hematoma size is decreased to 2.9 cm from 3.6 cm earlier.    Left breast-parenchymal density measuring 9 x 10 mm has markedly decreased.  Few adjacent calcifications which are unchanged.  No new abnormality in the left breast.  At 10:00 region there is some minimal adjacent hypoechoic texture which is difficult to measure but appears smaller since the previous exam.    10/28/2021-PET/CT  New L1 and L2 lytic bone metastasis annually intensely hypermetabolic focus at the left adrenal gland likely represents metastatic disease as well.  Slight decrease in size of the 0.9 x 0.7 cm left upper lobe pulmonary nodule.  There is hypermetabolic activity related to radiation pneumonitis.  The remainder of the nodule is photopenic.  Uncertain etiology of the more intense activity along the right lateral peripheral margin of the 2.6 cm postbiopsy density of the right breast.    She completed radiation to to the lumbar spine on 11/19/2021 11/22/2021-cycle 1 Alimta and Keytruda    Interval history:   Ms. Patel presents to the clinic today for follow-up.  She had a PET/CT and here to discuss the results of the same.  She is accompanied by her .  She is complaining of puffiness of her eyes with started about 2 weeks ago.  She also started taking Zyprexa around the same time.  No other new medications.  She has intermittent nausea vomiting.  Requesting refill on Compazine.  Her appetite is better since starting the Olanzapine and  she has been eating better.  Also reports better sleep.  Dyspnea relatively unchanged.  No other complaints at this time        The following portions of the patient's history were reviewed and updated as appropriate: allergies, current  medications, past family history, past medical history, past social history, past surgical history and problem list.    Past Medical History:   Diagnosis Date   • Anxiety    • Clot     POSSIBLE BLOOD CLOT IN VENOUS ACCESS DEVICE-PT STATES WAS STARTED ON ELIQUIS    • Dyspnea on exertion    • Elevated cholesterol    • Frequent urination     DAY AND NIGHT   • SHAYY (generalized anxiety disorder)     RELATED HIGH BLOOD PRESSURE   • GERD (gastroesophageal reflux disease)    • High blood pressure     ANXIETY RELATED   • Hyperlipidemia    • Hypothyroidism    • Lung cancer (HCC)    • Lung nodule     LEFT   • Malignant neoplasm of upper-outer quadrant of right breast in female, estrogen receptor positive (HCC) 4/13/2021    PT STATES HAS BILAT BREAST CANCER   • Migraine    • PONV (postoperative nausea and vomiting)         Past Surgical History:   Procedure Laterality Date   • BREAST BIOPSY Bilateral 04/07/2021    Right Breast 10 o'clock & Left breast 10 o'clock 6 cm from nipple, [facility]   • CLOSED REDUCTION HIP DISLOCATION Left 4/30/2021    Procedure: BRONCHOSCOPY, LEFT VIDEO ASSITED THORACOSCOPY, ROBOTIC ASSSITED MEDIASTINAL LYMPHADENECTOMY WITH INTERCOSTAL NERVE BLOCKS;  Surgeon: Raphael Kerr III, MD;  Location: [facility];  Service: DaVinci;  Laterality: Left;   • COLONOSCOPY     • TUBAL ABDOMINAL LIGATION     • VENOUS ACCESS DEVICE (PORT) INSERTION Right 5/20/2021    Procedure: INSERTION VENOUS ACCESS DEVICE;  Surgeon: Raphael Kerr III, MD;  Location: [facility] OR;  Service: Thoracic;  Laterality: Right;   • VENOUS ACCESS DEVICE (PORT) INSERTION Right 11/29/2021    Procedure: REVISION AND REPLACEMENT RIGHT SUBCLAVIAN VENOUS ACCESS PORT;  Surgeon: Raphael Kerr III, MD;  Location: [facility] OR;  Service: Thoracic;  Laterality: Right;   • WRIST SURGERY Right         Family History   Problem Relation Age of Onset   • Cancer Father         LYMPHATIC   • Prostate cancer Father    • Lymphoma Father    • Alcohol  "abuse Brother    • Colon cancer Maternal Grandmother    • Malig Hyperthermia Neg Hx         Social History     Socioeconomic History   • Marital status:      Spouse name: Jelani   Tobacco Use   • Smoking status: Former Smoker     Packs/day: 1.00     Years: 30.00     Pack years: 30.00     Types: Electronic Cigarette, Cigarettes     Start date: 1981     Quit date:      Years since quittin.0   • Smokeless tobacco: Never Used   • Tobacco comment: ABT 15 CIGARETTES DAILY   Vaping Use   • Vaping Use: Some days   • Substances: Nicotine, Flavoring   • Devices: Disposable   Substance and Sexual Activity   • Alcohol use: Never   • Drug use: Never   • Sexual activity: Yes     Partners: Male        OB History        2    Para   2    Term   2            AB        Living           SAB        IAB        Ectopic        Molar        Multiple        Live Births                Age of menarche-10  Age at menopause-42  Oral contraceptive pill use-15 years  No HRT use  She has 2 children  Age at first live childbirth-17    No Known Allergies     Review of Systems   Review of systems as mentioned in the HPI    Objective   Blood pressure 136/87, pulse 92, temperature 97.1 °F (36.2 °C), temperature source Temporal, resp. rate 16, height 160 cm (62.99\"), weight 78.6 kg (173 lb 4.8 oz), SpO2 97 %, not currently breastfeeding.       Physical Exam  Vitals reviewed.   HENT:      Head: Normocephalic.   Eyes:      Extraocular Movements: Extraocular movements intact.      Conjunctiva/sclera: Conjunctivae normal.      Pupils: Pupils are equal, round, and reactive to light.   Cardiovascular:      Rate and Rhythm: Normal rate and regular rhythm.      Pulses: Normal pulses.      Heart sounds: Normal heart sounds.   Pulmonary:      Effort: Pulmonary effort is normal.      Breath sounds: Normal breath sounds.   Abdominal:      General: Abdomen is flat. Bowel sounds are normal. There is no distension.      Palpations: There " is no mass.   Musculoskeletal:         General: No swelling. Normal range of motion.      Cervical back: Normal range of motion.   Skin:     General: Skin is warm.   Neurological:      General: No focal deficit present.      Mental Status: She is alert and oriented to person, place, and time.   Psychiatric:         Mood and Affect: Mood normal.         Behavior: Behavior normal.     Breast Exam: Right breast with a large postbiopsy hematoma in the upper outer quadrant measuring 4 cm.    No other palpable abnormalities.  Left breast with no palpable abnormalities.  No right or left axillary lymphadenopathy(breast not examined today, 1/24/2022)    I have reexamined the patient and the results are consistent with the previously documented exam. Salma Snell MD     Results from last 7 days   Lab Units 01/24/22  1144   WBC 10*3/mm3 7.50   NEUTROS ABS 10*3/mm3 5.21   HEMOGLOBIN g/dL 9.6*   HEMATOCRIT % 31.3*   PLATELETS 10*3/mm3 354               NM PET/CT Skull Base to Mid Thigh    Result Date: 1/19/2022  1. Further decrease in size of the 7 mm left upper lobe pulmonary nodule and the nodule remains photopenic. There is no hypermetabolic lymphadenopathy or suspicious activity within the chest. 2. Resolution of the hypermetabolic left adrenal nodule and hypermetabolic activity at the L1 and L2 metastases. There is no evidence for new metastatic disease.  This report was finalized on 1/19/2022 11:24 AM by Dr. Chaya Lee M.D.      1/24/2022 CBC reviewed and stable WBC of 7.5, hemoglobin 9.6 and platelets 354,000  CMP reviewed and potassium 3.3 but otherwise within normal limits.    Assessment/Plan      *Bilateral breast cancer  · Invasive ductal carcinoma in both right and left breast and lesions measure under 1 cm.  No palpable lymphadenopathy although unsure if this has been evaluated by an axillary ultrasound.  No abnormal axillary lymphadenopathy noted on PET.  · Most likely clinical T1b N0 M0, both cancers were  noted to be grade 2, ER/AL positive and HER-2 negative and Ki-67 less than 15%  VATS on 4/30/2021  Biopsy confirms adenocarcinoma of pulmonary primary  Discussed with Dr. Molina about delaying breast surgery and she is in agreement  Darted on neoadjuvant anastrozole  · Patient experienced significant adverse effects with anastrozole  · Treatment changed to letrozole   · Axillary ultrasound 4/20/2021 -negative for any axillary lymphadenopathy  · Started letrozole in May 2021  · She continues on letrozole without any problems.  · 8/24/2021 bilateral diagnostic mammogram and ultrasound.  In the left breast the tumor now measures 1 cm on mammogram as opposed to 0.8 cm in March.  Right breast calcifications also noted to be in a large extent although the number seems to be decreased.  Hematoma has decreased in size.  · 10/26/2021-bilateral diagnostic mammogram and ultrasound-decrease in size of the bilateral breast lesions.  · 10/26/2021-PET/CT-metastatic lesions in the left adrenal gland, L1-L2.  · 10/26/2021-MRI of the brain-no evidence of metastatic disease  · Since the breast lesions are decreasing in size, we will continue with letrozole.  · We previously discussed changing treatment to exemestane however since the most recent mammogram and ultrasound shows decrease in the breast lesions I think it is reasonable to proceed with letrozole.  · We will hold off on lumpectomy at this time to see what the course of lung cancer would be.  · Continues on letrozole, denies any new breast masses.      *Adenocarcinoma of the left lung, metastatic  · Most likely stage T1 N2 M0, stage III  · MRI of the thoracic spine ordered to further evaluate the abnormality seen on PET  · MRI of the brain negative   · Given that she is only 55 and fairly good performance status I discussed with her by with concurrent chemoradiation with cisplatin and Alimta every 3 weeks followed by durvalumab.  · Adverse effects including but not limited  to myelosuppression, increased risk of infections, nausea, vomiting, renal dysfunction, ototoxicity, neurotoxicity have been discussed at length.  · MRI of the thoracic spine performed 5/24/2021 shows no evidence of metastatic disease in the thoracic spine however in L1 there is a 7 mm lesion which is indeterminate.  A lumbar spine MRI has been recommended.  · Discussed her case with Dr. Oates and he feels like MRI of the lumbar spine may also show that this lesion is indeterminate.  · Discussed the same with the patient and her .  · Even if this is metastatic disease this might be the only lesion of metastatic disease and then would consider this oligometastatic disease.  · Therefore plan to proceed with concurrent chemoradiation as scheduled.  · cisplatin and Alimta, C1D1 5/25/2021  · Day 1 of radiation 5/25/2021  · 5/27/2021-MRI of the lumbar spine-there is a 6 x 5 x 6 mm enhancing lesion in the left posterior body of L1 vertebra.  This is considered indeterminate.  Follow-up recommended.  Degenerative changes noted at L5-S1  · 7/7/2021, cycle 3 cisplatin Alimta  · 7/12/2021-radiation completed  · PET/CT 8/6/2021 with good response to chemoradiation.  · MRI of the lumbar spine 10/1/2021 with increase in size of the L1 lesion which now measures 14 mm as opposed to 6 mm in May 2021.  · 10/14/2021-biopsy of the L1 lesion and pathology consistent with adenocarcinoma of the lung, TPS 0  · 0/26/2021-PET/CT-metastatic lesions in the left adrenal gland, L1-L2.  · 10/26/2021-MRI of the brain-no evidence of metastatic disease  · Given that she only has metastatic disease in L1-L2 and left adrenal gland I think it would be possible to radiate all these regions.  · Completed radiation to L1-L2 on 11/19/2021  · 11/22/2021-cycle 1 of Keytruda and Alimta  · Completed radiation to the left adrenal gland  · 1/18/2022-PET/CT-images independently reviewed and interpreted by me.  Decrease in size of the 7 mm pulmonary nodule  in the left upper lobe which previously measured 9 mm.  Radiation pneumonitis is nearly resolved.  Hypermetabolic activity at the L1-L2 metastasis has resolved.  No new suspicious metastatic disease.  · Guardant 360 circulating DNA level decreasing.  · 1/24/2022-continue with Alimta and Keytruda is good disease response.  · Labs reviewed and stable today    *Bilateral breast cancer-no family history of breast cancer but given synchronous breast cancer genetic testing has been sent.  Genetic testing with a variant of unknown significance.    *Right hand numbness and difficulty with grasping objects  · Previously had history of carpal tunnel requiring repair.  · She is currently on letrozole which could cause worsening of the carpal tunnel  · Gabapentin dose will be increased to 600 mg 3 times daily  · She will also be referred to hand surgery for further evaluation.  · She now reports bilateral upper extremity numbness.  · Continue gabapentin  · MRI of the brain 10/26/2021 without any evidence of metastatic disease  · Not an issue today    *Anxiety-  · Currently on Xanax 1 mg nightly  · She required Valium for the MRI as she had claustrophobia and a panic attack  · She started Zyprexa about 2 weeks ago.  · Anxiety has improved some however she is reporting puffiness of her eyes since starting Zyprexa.  · Therefore we will hold off on Zyprexa    *Left-sided chest wall pain-secondary to VATS.  Most likely neuropathic.  Continue gabapentin and Norco as needed.  She ran out of gabapentin and experienced leg cramps.    Continue gabapentin  Improved      *Hypertension-continue lisinopril.  Blood pressure 136/80    *Hypokalemia-replace potassium    *Smoking-not addressed today    *GERD  · 7/7/2021, patient complains of reflux despite the use of Prilosec 20 mg twice daily.  We will send her a prescription for Carafate slurry 4 times daily.  · She has not quite started using the Carafate.  · Continue Prilosec and  Carafate  · PET/CT shows duodenitis    *Port not returning blood    · A new Mediport was placed by thoracic surgery 11/29/2021, Eliquis has been discontinued and right chest Mediport is now safe to use.    *Puffiness of the eyes  · The only new medication which she started to Zyprexa.  This could be contributing.  · Hold Zyprexa    PLAN:  1. Proceed today with Keytruda Alimta  2. Continue letrozole 2.5 mg daily   3. Hold Zyprexa  4. Follow-up with supportive oncology  5. B12 injection  6. Continue PPI  7. Follow-up in 6 weeks    42 minutes spent on the encounter including reviewing the images, face-to-face time, documentation of the same day    Patient is on treatment requiring close monitoring for toxicities    Salma Snell MD

## 2022-02-04 ENCOUNTER — TELEPHONE (OUTPATIENT)
Dept: PSYCHIATRY | Facility: HOSPITAL | Age: 57
End: 2022-02-04

## 2022-02-04 NOTE — TELEPHONE ENCOUNTER
[facility]      Call placed to pt regarding this morning's appointment, cancelled due to weather. LM for for patient offering virtual session vs reschedule. Pt has number to clinic to reschedule as needed.

## 2022-02-14 ENCOUNTER — INFUSION (OUTPATIENT)
Dept: ONCOLOGY | Facility: HOSPITAL | Age: 57
End: 2022-02-14

## 2022-02-14 VITALS
WEIGHT: 171.4 LBS | RESPIRATION RATE: 16 BRPM | TEMPERATURE: 98.2 F | BODY MASS INDEX: 30.37 KG/M2 | SYSTOLIC BLOOD PRESSURE: 147 MMHG | DIASTOLIC BLOOD PRESSURE: 86 MMHG | HEART RATE: 102 BPM | OXYGEN SATURATION: 95 %

## 2022-02-14 DIAGNOSIS — Z45.2 ENCOUNTER FOR FITTING AND ADJUSTMENT OF VASCULAR CATHETER: ICD-10-CM

## 2022-02-14 DIAGNOSIS — C34.12 PRIMARY ADENOCARCINOMA OF UPPER LOBE OF LEFT LUNG: Primary | ICD-10-CM

## 2022-02-14 DIAGNOSIS — R59.0 MEDIASTINAL ADENOPATHY: ICD-10-CM

## 2022-02-14 LAB
ALBUMIN SERPL-MCNC: 3.9 G/DL (ref 3.5–5.2)
ALBUMIN/GLOB SERPL: 1.2 G/DL (ref 1.1–2.4)
ALP SERPL-CCNC: 131 U/L (ref 38–116)
ALT SERPL W P-5'-P-CCNC: 51 U/L (ref 0–33)
ANION GAP SERPL CALCULATED.3IONS-SCNC: 12 MMOL/L (ref 5–15)
AST SERPL-CCNC: 48 U/L (ref 0–32)
BASOPHILS # BLD AUTO: 0.1 10*3/MM3 (ref 0–0.2)
BASOPHILS NFR BLD AUTO: 1.1 % (ref 0–1.5)
BILIRUB SERPL-MCNC: <0.2 MG/DL (ref 0.2–1.2)
BUN SERPL-MCNC: 14 MG/DL (ref 6–20)
BUN/CREAT SERPL: 11.7 (ref 7.3–30)
CALCIUM SPEC-SCNC: 8.9 MG/DL (ref 8.5–10.2)
CHLORIDE SERPL-SCNC: 106 MMOL/L (ref 98–107)
CO2 SERPL-SCNC: 25 MMOL/L (ref 22–29)
CREAT SERPL-MCNC: 1.2 MG/DL (ref 0.6–1.1)
DEPRECATED RDW RBC AUTO: 75.7 FL (ref 37–54)
EOSINOPHIL # BLD AUTO: 0.78 10*3/MM3 (ref 0–0.4)
EOSINOPHIL NFR BLD AUTO: 8.8 % (ref 0.3–6.2)
ERYTHROCYTE [DISTWIDTH] IN BLOOD BY AUTOMATED COUNT: 22 % (ref 12.3–15.4)
GFR SERPL CREATININE-BSD FRML MDRD: 46 ML/MIN/1.73
GLOBULIN UR ELPH-MCNC: 3.3 GM/DL (ref 1.8–3.5)
GLUCOSE SERPL-MCNC: 113 MG/DL (ref 74–124)
HCT VFR BLD AUTO: 29.7 % (ref 34–46.6)
HGB BLD-MCNC: 9.2 G/DL (ref 12–15.9)
IMM GRANULOCYTES # BLD AUTO: 0.12 10*3/MM3 (ref 0–0.05)
IMM GRANULOCYTES NFR BLD AUTO: 1.3 % (ref 0–0.5)
LYMPHOCYTES # BLD AUTO: 0.85 10*3/MM3 (ref 0.7–3.1)
LYMPHOCYTES NFR BLD AUTO: 9.6 % (ref 19.6–45.3)
MCH RBC QN AUTO: 29.4 PG (ref 26.6–33)
MCHC RBC AUTO-ENTMCNC: 31 G/DL (ref 31.5–35.7)
MCV RBC AUTO: 94.9 FL (ref 79–97)
MONOCYTES # BLD AUTO: 1.1 10*3/MM3 (ref 0.1–0.9)
MONOCYTES NFR BLD AUTO: 12.4 % (ref 5–12)
NEUTROPHILS NFR BLD AUTO: 5.95 10*3/MM3 (ref 1.7–7)
NEUTROPHILS NFR BLD AUTO: 66.8 % (ref 42.7–76)
NRBC BLD AUTO-RTO: 0 /100 WBC (ref 0–0.2)
PLATELET # BLD AUTO: 341 10*3/MM3 (ref 140–450)
PMV BLD AUTO: 9 FL (ref 6–12)
POTASSIUM SERPL-SCNC: 3.9 MMOL/L (ref 3.5–4.7)
PROT SERPL-MCNC: 7.2 G/DL (ref 6.3–8)
RBC # BLD AUTO: 3.13 10*6/MM3 (ref 3.77–5.28)
SODIUM SERPL-SCNC: 143 MMOL/L (ref 134–145)
T4 FREE SERPL-MCNC: 1.1 NG/DL (ref 0.93–1.7)
TSH SERPL DL<=0.05 MIU/L-ACNC: 1.34 UIU/ML (ref 0.27–4.2)
WBC NRBC COR # BLD: 8.9 10*3/MM3 (ref 3.4–10.8)

## 2022-02-14 PROCEDURE — 25010000002 PEMETREXED PER 10 MG: Performed by: NURSE PRACTITIONER

## 2022-02-14 PROCEDURE — 85025 COMPLETE CBC W/AUTO DIFF WBC: CPT

## 2022-02-14 PROCEDURE — 84443 ASSAY THYROID STIM HORMONE: CPT | Performed by: INTERNAL MEDICINE

## 2022-02-14 PROCEDURE — 25010000002 PEMBROLIZUMAB 100 MG/4ML SOLUTION 4 ML VIAL: Performed by: NURSE PRACTITIONER

## 2022-02-14 PROCEDURE — 25010000002 CYANOCOBALAMIN PER 1000 MCG: Performed by: NURSE PRACTITIONER

## 2022-02-14 PROCEDURE — 84439 ASSAY OF FREE THYROXINE: CPT | Performed by: INTERNAL MEDICINE

## 2022-02-14 PROCEDURE — 96411 CHEMO IV PUSH ADDL DRUG: CPT

## 2022-02-14 PROCEDURE — 80053 COMPREHEN METABOLIC PANEL: CPT

## 2022-02-14 PROCEDURE — 96372 THER/PROPH/DIAG INJ SC/IM: CPT

## 2022-02-14 PROCEDURE — 25010000002 HEPARIN LOCK FLUSH PER 10 UNITS: Performed by: INTERNAL MEDICINE

## 2022-02-14 PROCEDURE — 96413 CHEMO IV INFUSION 1 HR: CPT

## 2022-02-14 RX ORDER — HEPARIN SODIUM (PORCINE) LOCK FLUSH IV SOLN 100 UNIT/ML 100 UNIT/ML
500 SOLUTION INTRAVENOUS AS NEEDED
Status: CANCELLED | OUTPATIENT
Start: 2022-02-14

## 2022-02-14 RX ORDER — SODIUM CHLORIDE 0.9 % (FLUSH) 0.9 %
10 SYRINGE (ML) INJECTION AS NEEDED
Status: CANCELLED | OUTPATIENT
Start: 2022-02-14

## 2022-02-14 RX ORDER — CYANOCOBALAMIN 1000 UG/ML
1000 INJECTION, SOLUTION INTRAMUSCULAR; SUBCUTANEOUS ONCE
Status: COMPLETED | OUTPATIENT
Start: 2022-02-14 | End: 2022-02-14

## 2022-02-14 RX ORDER — GABAPENTIN 300 MG/1
CAPSULE ORAL
Qty: 180 CAPSULE | Refills: 0 | Status: SHIPPED | OUTPATIENT
Start: 2022-02-14 | End: 2022-04-12

## 2022-02-14 RX ORDER — SODIUM CHLORIDE 9 MG/ML
250 INJECTION, SOLUTION INTRAVENOUS ONCE
Status: COMPLETED | OUTPATIENT
Start: 2022-02-14 | End: 2022-02-14

## 2022-02-14 RX ORDER — HEPARIN SODIUM (PORCINE) LOCK FLUSH IV SOLN 100 UNIT/ML 100 UNIT/ML
500 SOLUTION INTRAVENOUS AS NEEDED
Status: DISCONTINUED | OUTPATIENT
Start: 2022-02-14 | End: 2022-02-14 | Stop reason: HOSPADM

## 2022-02-14 RX ADMIN — SODIUM CHLORIDE 250 ML: 9 INJECTION, SOLUTION INTRAVENOUS at 15:41

## 2022-02-14 RX ADMIN — CYANOCOBALAMIN 1000 MCG: 1000 INJECTION, SOLUTION INTRAMUSCULAR at 15:22

## 2022-02-14 RX ADMIN — SODIUM CHLORIDE 200 MG: 9 INJECTION, SOLUTION INTRAVENOUS at 15:40

## 2022-02-14 RX ADMIN — Medication 500 UNITS: at 16:24

## 2022-02-14 RX ADMIN — SODIUM CHLORIDE 930 MG: 9 INJECTION, SOLUTION INTRAVENOUS at 16:10

## 2022-02-14 NOTE — NURSING NOTE
Pt here for Keytruda and Alimta.Her ALT is 51 today and her AST is 48. She typically runs within normal limits. She denies Tylenol or alcohol use. She is still within the perimeter to give the drugs per pharmacy guidelines. Message sent to Dr Snell who  is ok with tx and will re-check labs when she is seen in 3 weeks.

## 2022-02-16 ENCOUNTER — OFFICE VISIT (OUTPATIENT)
Dept: FAMILY MEDICINE CLINIC | Facility: CLINIC | Age: 57
End: 2022-02-16

## 2022-02-16 VITALS
BODY MASS INDEX: 30.87 KG/M2 | DIASTOLIC BLOOD PRESSURE: 74 MMHG | WEIGHT: 174.2 LBS | TEMPERATURE: 97.5 F | SYSTOLIC BLOOD PRESSURE: 130 MMHG | OXYGEN SATURATION: 99 % | HEIGHT: 63 IN | HEART RATE: 72 BPM

## 2022-02-16 DIAGNOSIS — E78.2 MIXED HYPERLIPIDEMIA: ICD-10-CM

## 2022-02-16 DIAGNOSIS — Z86.69 HISTORY OF MIGRAINE: ICD-10-CM

## 2022-02-16 DIAGNOSIS — I10 ESSENTIAL HYPERTENSION: ICD-10-CM

## 2022-02-16 DIAGNOSIS — F41.9 ANXIETY: ICD-10-CM

## 2022-02-16 DIAGNOSIS — E03.8 OTHER SPECIFIED HYPOTHYROIDISM: ICD-10-CM

## 2022-02-16 DIAGNOSIS — Z86.010 HISTORY OF COLON POLYPS: ICD-10-CM

## 2022-02-16 DIAGNOSIS — Z00.00 WELLNESS EXAMINATION: Primary | ICD-10-CM

## 2022-02-16 DIAGNOSIS — N18.31 STAGE 3A CHRONIC KIDNEY DISEASE: ICD-10-CM

## 2022-02-16 PROCEDURE — 99396 PREV VISIT EST AGE 40-64: CPT | Performed by: FAMILY MEDICINE

## 2022-02-16 PROCEDURE — 99214 OFFICE O/P EST MOD 30 MIN: CPT | Performed by: FAMILY MEDICINE

## 2022-02-16 RX ORDER — DEXAMETHASONE 4 MG/1
TABLET ORAL
Qty: 12 TABLET | Refills: 3 | Status: SHIPPED | OUTPATIENT
Start: 2022-02-16 | End: 2022-02-16

## 2022-02-16 RX ORDER — ALPRAZOLAM 0.5 MG/1
0.5 TABLET ORAL 2 TIMES DAILY PRN
Qty: 60 TABLET | Refills: 2 | Status: SHIPPED | OUTPATIENT
Start: 2022-02-16 | End: 2022-05-16 | Stop reason: SDUPTHER

## 2022-02-16 RX ORDER — BUPROPION HYDROCHLORIDE 300 MG/1
300 TABLET ORAL DAILY
Qty: 30 TABLET | Refills: 5 | Status: SHIPPED | OUTPATIENT
Start: 2022-02-16 | End: 2022-05-25 | Stop reason: SDUPTHER

## 2022-02-16 NOTE — PATIENT INSTRUCTIONS
Start Wellbutrin 300 mg daily    Continue current treatment plan.  Recommend low fat/low calorie diet and exercise greater than 150 minutes of cardio per week.

## 2022-02-16 NOTE — PROGRESS NOTES
Chief Complaint  Annual Exam (UNDER CARE OF MANY PHYSICIANS REALLY DOES NOT WANT PAP TOO MUCH GOING ON HAD COLONOSCOPY WITHIN THE YEAR), Anxiety, Hyperlipidemia, and Hypertension     PRESENTS FOR ANNUAL WELLNESS EXAM  And  3-month follow-up for SHAYY  And  6-month follow-up for HTN, hyperlipidemia, migraine, and recent bilateral breast CA and lung carcinoma    LOV with me in November after newly diagnosed bilateral breast CA and metastatic lung carcinoma.  At that visit, the only medication change made was --- her Xanax was increased to twice daily due to uncontrolled SHAYY.    Interval history since last visit --she has completed XRT, currently still receiving chemo meds (Keytruda and another chemo med) every 3 weeks under the direction of her oncologist/Dr. Snell.  Recent PET scan January 2022 --decreased size of left upper lobe mass, overall improvement in adrenal mass and L1/L2 mets, no new mets.  Has lab work done every 3 weeks --all WNL except for some mild elevation with LFTs and decreased GFR.  No changes made with chemo routine or other meds.    For the most part, doing well all considering.  Handling her chemo treatment fairly well except for some facial swelling (which has been present times months, since starting chemo.)  Weight stable.  Appetite fine.  However, struggling with anxiety and stressors (her own health, but also caring for her elderly mother and mildly disabled sister.)  Apparently, her oncologist prescribed Zyprexa nightly to help with sleep and anxiety.  This is helping her sleep, but still quite anxious.  Review of medications by me today --she is taking her Prozac 40 mg daily.  However, somehow her Wellbutrin has been discontinued?  Never refilled?  She is not quite sure what happened to it??  No longer working, seeking long-term disability.  Currently with [employer].    Otherwise, just needs chronic med refills today.  Compliant with and tolerates all medications without side effects.  Still  "taking Xanax twice daily.  No longer taking pain medication (this was prescribed by oncologist for bony mets, which are now improved after XRT treatment.)    Note, much of her annual screening is all up-to-date.  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    Routine health maintenance/screening test:  PAP --- February 2021, normal  MAMMO --- February 2021 --- bilateral breast carcinoma, now managed by oncologist  BERTO --- March 2021  Colorectal Screen --- February 2021 C-scope, polyps, repeat in 3 years  Vaccines --- all vaccinations up-to-date except for annual influenza vaccine and shingles vaccine  Smoking/ETOH Status --- reformed tobacco use, quit 2021.  No alcohol use  Dentist, Eye Exam, Derm --- maintains regular dental visits and ophthalmology visit, no dermatologist  Diet/Exercise --- tries maintain a healthy well-balanced diet, exercise is sporadic  Pertinent FH --- positive for colon cancer/maternal grandmother, positive for prostate cancer and lymphoma/father, negative premature CAD and breast cancer    Review of Systems   Constitutional: Negative for fever and unexpected weight change.   Respiratory: Negative for cough and shortness of breath.    Cardiovascular: Negative for chest pain.        Subjective          Holli Patel presents to [facility]    Objective   Vital Signs:   Vitals:    02/16/22 1520   BP: 130/74   Pulse: 72   Temp: 97.5 °F (36.4 °C)   SpO2: 99%   Weight: 79 kg (174 lb 3.2 oz)   Height: 160 cm (63\")      Physical Exam  Vitals and nursing note reviewed.   Constitutional:       Appearance: Normal appearance. She is well-developed. She is obese.      Comments: Mild facial edema   HENT:      Head: Normocephalic and atraumatic.      Nose: " Nose normal.   Eyes:      Conjunctiva/sclera: Conjunctivae normal.      Pupils: Pupils are equal, round, and reactive to light.   Neck:      Thyroid: No thyromegaly.   Cardiovascular:      Rate and Rhythm: Normal rate and regular rhythm.      Heart sounds: Normal heart sounds. No murmur heard.      Pulmonary:      Effort: Pulmonary effort is normal.      Breath sounds: Normal breath sounds.   Abdominal:      General: Abdomen is flat. Bowel sounds are normal. There is no distension.      Palpations: Abdomen is soft. There is no hepatomegaly, splenomegaly or mass.      Tenderness: There is no abdominal tenderness. There is no guarding or rebound.      Hernia: No hernia is present.   Musculoskeletal:         General: Normal range of motion.      Cervical back: Normal range of motion and neck supple.      Right lower leg: No edema.      Left lower leg: No edema.   Lymphadenopathy:      Cervical: No cervical adenopathy.   Skin:     General: Skin is warm.   Neurological:      General: No focal deficit present.      Mental Status: She is alert.   Psychiatric:         Mood and Affect: Mood normal.         Behavior: Behavior normal.         Thought Content: Thought content normal.         Judgment: Judgment normal.        Result Review :     CMP    CMP 1/3/22 1/24/22 2/14/22   Glucose 88 105 113   BUN 15 12 14   Creatinine 1.04 0.99 1.20 (A)   eGFR Non African Am 54 (A) 58 (A) 46 (A)   Sodium 144 144 143   Potassium 3.1 (A) 3.3 (A) 3.9   Chloride 106 106 106   Calcium 9.3 9.1 8.9   Albumin 4.10 4.00 3.90   Total Bilirubin 0.2 <0.2 (A) <0.2 (A)   Alkaline Phosphatase 118 (A) 109 131 (A)   AST (SGOT) 18 29 48 (A)   ALT (SGPT) 14 24 51 (A)   (A) Abnormal value            CBC w/diff    CBC w/Diff 1/3/22 1/24/22 2/14/22   WBC 6.49 7.50 8.90   RBC 3.55 (A) 3.40 (A) 3.13 (A)   Hemoglobin 9.8 (A) 9.6 (A) 9.2 (A)   Hematocrit 31.7 (A) 31.3 (A) 29.7 (A)   MCV 89.3 92.1 94.9   MCH 27.6 28.2 29.4   MCHC 30.9 (A) 30.7 (A) 31.0 (A)   RDW  17.7 (A) 20.4 (A) 22.0 (A)   Platelets 365 354 341   Neutrophil Rel % 69.0 69.4 66.8   Immature Granulocyte Rel % 0.6 (A) 1.1 (A) 1.3 (A)   Lymphocyte Rel % 10.8 (A) 9.9 (A) 9.6 (A)   Monocyte Rel % 12.2 (A) 11.6 12.4 (A)   Eosinophil Rel % 6.3 (A) 6.8 (A) 8.8 (A)   Basophil Rel % 1.1 1.2 1.1   (A) Abnormal value            Lipid Panel    Lipid Panel 11/22/21   Total Cholesterol 201 (A)   Triglycerides 172 (A)   HDL Cholesterol 59   VLDL Cholesterol 30   LDL Cholesterol  112 (A)   LDL/HDL Ratio 1.9   (A) Abnormal value       Comments are available for some flowsheets but are not being displayed.           TSH    TSH 11/22/21 1/3/22 2/14/22   TSH 2.210 1.830 1.340                        Assessment and Plan    Diagnoses and all orders for this visit:    1. Wellness examination (Primary)  -within normal limits, except for BMI and cancer history.  All screening up-to-date (extensive amount of screening done in 2021, leading to newly diagnosed bilateral breast carcinoma and lung carcinoma with mets,) except still needs annual high-dose influenza vaccine and shingles vaccine (plans to obtain from pharmacy.)    Otherwise, weight loss needed --Needs low-carb/low calorie/low cholesterol diet and increase cardio exercise to greater than 150 minutes weekly    2. History of colon polyps  -due to repeat in February 2024    3. Anxiety  -remains uncontrolled  Significant amount of stressors (both personal health/chemo and family stressors/caring for elderly mother and disabled sister.)  This may be uncontrolled due to being out of Wellbutrin mistakenly?  Never refilled?  See HPI above for further details.  Recommend restarting Wellbutrin  mg daily  Continue Prozac 40 mg daily, will refill upon request.  Continue Xanax as prescribed below.  Star report reviewed, appropriate.  -     ALPRAZolam (XANAX) 0.5 MG tablet; Take 1 tablet by mouth 2 (Two) Times a Day As Needed for Anxiety.  Dispense: 60 tablet; Refill: 2    4.  Essential hypertension  -controlled  No change of medication.  Continue Prinivil 20 mg daily, will refill upon request    5. History of migraine  -controlled.  No change with Inderal or Maxalt, will refill upon request    6. Other specified hypothyroidism  -controlled  TSH recently done, therapeutic.  No change of medication.  Continue Synthroid 25 mcg every morning, will refill upon request.    7. Mixed hyperlipidemia  -controlled.  Lipids and CMP up-to-date, therapeutic.  No change with Zocor.  Continue Zocor 20 mg daily, will refill upon request.  However, need to watch LFTs closely (mildly elevated at last lab draw.)    8. Stage 3a chronic kidney disease (HCC)  -new finding.  Most likely this is due to initiation of chemo med and/or dehydration??  Labs will be done every 3 weeks by oncologist.  Try to stay well-hydrated.  Continue to avoid NSAIDs.    Other orders  -     buPROPion XL (WELLBUTRIN XL) 300 MG 24 hr tablet; Take 1 tablet by mouth Daily.  Dispense: 30 tablet; Refill: 5        Follow Up   Return in about 3 months (around 5/16/2022) for Recheck.  Patient was given instructions and counseling regarding her condition or for health maintenance advice. Please see specific information pulled into the AVS if appropriate.

## 2022-02-16 NOTE — TELEPHONE ENCOUNTER
Jelani called me today reporting the periorbital edema essentially was gone the two days prior to Holli getting treatment.  She notes today that her eyes and cheeks are puffy again.  Reviewed with Dr Snell, she feels this is related to her alimta and recommends Holli take dexamethasone 4mg the day before treatment and the day after.  This was escribed to pt pharmacy and relayed the above to her  Jelani.

## 2022-02-28 ENCOUNTER — TELEPHONE (OUTPATIENT)
Dept: ONCOLOGY | Facility: CLINIC | Age: 57
End: 2022-02-28

## 2022-02-28 NOTE — TELEPHONE ENCOUNTER
"Holli's  Jelani called today to report that Holli developed periorbital edema and neck edema \"out of the blue\" yesterday/today.  She last got treatment on 2/14.  He said it went down about a week after treatment but developed again.  Returned his call and advised him I would let Dr Snell know.   "

## 2022-03-02 RX ORDER — IBUPROFEN 800 MG/1
800 TABLET ORAL EVERY 6 HOURS PRN
Qty: 270 TABLET | Refills: 1 | Status: SHIPPED | OUTPATIENT
Start: 2022-03-02 | End: 2022-05-25

## 2022-03-07 ENCOUNTER — INFUSION (OUTPATIENT)
Dept: ONCOLOGY | Facility: HOSPITAL | Age: 57
End: 2022-03-07

## 2022-03-07 ENCOUNTER — OFFICE VISIT (OUTPATIENT)
Dept: ONCOLOGY | Facility: CLINIC | Age: 57
End: 2022-03-07

## 2022-03-07 VITALS
DIASTOLIC BLOOD PRESSURE: 102 MMHG | HEART RATE: 83 BPM | OXYGEN SATURATION: 99 % | SYSTOLIC BLOOD PRESSURE: 155 MMHG | BODY MASS INDEX: 31.38 KG/M2 | RESPIRATION RATE: 20 BRPM | HEIGHT: 63 IN | WEIGHT: 177.1 LBS | TEMPERATURE: 96.4 F

## 2022-03-07 VITALS — SYSTOLIC BLOOD PRESSURE: 156 MMHG | DIASTOLIC BLOOD PRESSURE: 94 MMHG

## 2022-03-07 DIAGNOSIS — Z17.0 MALIGNANT NEOPLASM OF UPPER-OUTER QUADRANT OF RIGHT BREAST IN FEMALE, ESTROGEN RECEPTOR POSITIVE: ICD-10-CM

## 2022-03-07 DIAGNOSIS — C50.411 MALIGNANT NEOPLASM OF UPPER-OUTER QUADRANT OF RIGHT BREAST IN FEMALE, ESTROGEN RECEPTOR POSITIVE: ICD-10-CM

## 2022-03-07 DIAGNOSIS — C34.90 NON-SMALL CELL LUNG CANCER METASTATIC TO BONE: ICD-10-CM

## 2022-03-07 DIAGNOSIS — Z79.899 HIGH RISK MEDICATION USE: ICD-10-CM

## 2022-03-07 DIAGNOSIS — C34.12 PRIMARY ADENOCARCINOMA OF UPPER LOBE OF LEFT LUNG: ICD-10-CM

## 2022-03-07 DIAGNOSIS — C34.12 PRIMARY ADENOCARCINOMA OF UPPER LOBE OF LEFT LUNG: Primary | ICD-10-CM

## 2022-03-07 DIAGNOSIS — Z45.2 ENCOUNTER FOR FITTING AND ADJUSTMENT OF VASCULAR CATHETER: ICD-10-CM

## 2022-03-07 DIAGNOSIS — C79.51 NON-SMALL CELL LUNG CANCER METASTATIC TO BONE: ICD-10-CM

## 2022-03-07 LAB
ALBUMIN SERPL-MCNC: 4 G/DL (ref 3.5–5.2)
ALBUMIN/GLOB SERPL: 1.2 G/DL (ref 1.1–2.4)
ALP SERPL-CCNC: 142 U/L (ref 38–116)
ALT SERPL W P-5'-P-CCNC: 44 U/L (ref 0–33)
ANION GAP SERPL CALCULATED.3IONS-SCNC: 11.9 MMOL/L (ref 5–15)
AST SERPL-CCNC: 40 U/L (ref 0–32)
BASOPHILS # BLD AUTO: 0.06 10*3/MM3 (ref 0–0.2)
BASOPHILS NFR BLD AUTO: 0.5 % (ref 0–1.5)
BILIRUB SERPL-MCNC: <0.2 MG/DL (ref 0.2–1.2)
BUN SERPL-MCNC: 18 MG/DL (ref 6–20)
BUN/CREAT SERPL: 14.6 (ref 7.3–30)
CALCIUM SPEC-SCNC: 9.3 MG/DL (ref 8.5–10.2)
CHLORIDE SERPL-SCNC: 105 MMOL/L (ref 98–107)
CO2 SERPL-SCNC: 24.1 MMOL/L (ref 22–29)
CREAT SERPL-MCNC: 1.23 MG/DL (ref 0.6–1.1)
DEPRECATED RDW RBC AUTO: 76.1 FL (ref 37–54)
EGFRCR SERPLBLD CKD-EPI 2021: 51 ML/MIN/1.73
EOSINOPHIL # BLD AUTO: 0.04 10*3/MM3 (ref 0–0.4)
EOSINOPHIL NFR BLD AUTO: 0.3 % (ref 0.3–6.2)
ERYTHROCYTE [DISTWIDTH] IN BLOOD BY AUTOMATED COUNT: 21.4 % (ref 12.3–15.4)
GLOBULIN UR ELPH-MCNC: 3.4 GM/DL (ref 1.8–3.5)
GLUCOSE SERPL-MCNC: 121 MG/DL (ref 74–124)
HCT VFR BLD AUTO: 27.9 % (ref 34–46.6)
HGB BLD-MCNC: 8.4 G/DL (ref 12–15.9)
IMM GRANULOCYTES # BLD AUTO: 0.31 10*3/MM3 (ref 0–0.05)
IMM GRANULOCYTES NFR BLD AUTO: 2.6 % (ref 0–0.5)
LYMPHOCYTES # BLD AUTO: 1.22 10*3/MM3 (ref 0.7–3.1)
LYMPHOCYTES NFR BLD AUTO: 10.2 % (ref 19.6–45.3)
MCH RBC QN AUTO: 29.6 PG (ref 26.6–33)
MCHC RBC AUTO-ENTMCNC: 30.1 G/DL (ref 31.5–35.7)
MCV RBC AUTO: 98.2 FL (ref 79–97)
MONOCYTES # BLD AUTO: 1.04 10*3/MM3 (ref 0.1–0.9)
MONOCYTES NFR BLD AUTO: 8.7 % (ref 5–12)
NEUTROPHILS NFR BLD AUTO: 77.7 % (ref 42.7–76)
NEUTROPHILS NFR BLD AUTO: 9.26 10*3/MM3 (ref 1.7–7)
NRBC BLD AUTO-RTO: 0 /100 WBC (ref 0–0.2)
PLATELET # BLD AUTO: 383 10*3/MM3 (ref 140–450)
PMV BLD AUTO: 9.1 FL (ref 6–12)
POTASSIUM SERPL-SCNC: 4 MMOL/L (ref 3.5–4.7)
PROT SERPL-MCNC: 7.4 G/DL (ref 6.3–8)
RBC # BLD AUTO: 2.84 10*6/MM3 (ref 3.77–5.28)
SODIUM SERPL-SCNC: 141 MMOL/L (ref 134–145)
WBC NRBC COR # BLD: 11.93 10*3/MM3 (ref 3.4–10.8)

## 2022-03-07 PROCEDURE — 25010000002 HEPARIN LOCK FLUSH PER 10 UNITS: Performed by: INTERNAL MEDICINE

## 2022-03-07 PROCEDURE — 96411 CHEMO IV PUSH ADDL DRUG: CPT

## 2022-03-07 PROCEDURE — 96413 CHEMO IV INFUSION 1 HR: CPT

## 2022-03-07 PROCEDURE — 25010000002 PEMBROLIZUMAB 100 MG/4ML SOLUTION 4 ML VIAL: Performed by: INTERNAL MEDICINE

## 2022-03-07 PROCEDURE — 25010000002 PEMETREXED PER 10 MG: Performed by: INTERNAL MEDICINE

## 2022-03-07 PROCEDURE — 25010000002 PEMETREXED 100 MG RECONSTITUTED SOLUTION 100 MG VIAL: Performed by: INTERNAL MEDICINE

## 2022-03-07 PROCEDURE — 80053 COMPREHEN METABOLIC PANEL: CPT

## 2022-03-07 PROCEDURE — 99215 OFFICE O/P EST HI 40 MIN: CPT | Performed by: INTERNAL MEDICINE

## 2022-03-07 PROCEDURE — 85025 COMPLETE CBC W/AUTO DIFF WBC: CPT

## 2022-03-07 RX ORDER — SODIUM CHLORIDE 9 MG/ML
250 INJECTION, SOLUTION INTRAVENOUS ONCE
Status: CANCELLED | OUTPATIENT
Start: 2022-03-07

## 2022-03-07 RX ORDER — HEPARIN SODIUM (PORCINE) LOCK FLUSH IV SOLN 100 UNIT/ML 100 UNIT/ML
500 SOLUTION INTRAVENOUS AS NEEDED
Status: CANCELLED | OUTPATIENT
Start: 2022-03-07

## 2022-03-07 RX ORDER — SODIUM CHLORIDE 0.9 % (FLUSH) 0.9 %
10 SYRINGE (ML) INJECTION AS NEEDED
Status: CANCELLED | OUTPATIENT
Start: 2022-03-07

## 2022-03-07 RX ORDER — FUROSEMIDE 20 MG/1
20 TABLET ORAL DAILY
Qty: 30 TABLET | Refills: 2 | Status: SHIPPED | OUTPATIENT
Start: 2022-03-07 | End: 2022-06-28

## 2022-03-07 RX ORDER — HEPARIN SODIUM (PORCINE) LOCK FLUSH IV SOLN 100 UNIT/ML 100 UNIT/ML
500 SOLUTION INTRAVENOUS AS NEEDED
Status: DISCONTINUED | OUTPATIENT
Start: 2022-03-07 | End: 2022-03-07 | Stop reason: HOSPADM

## 2022-03-07 RX ORDER — SODIUM CHLORIDE 9 MG/ML
250 INJECTION, SOLUTION INTRAVENOUS ONCE
Status: COMPLETED | OUTPATIENT
Start: 2022-03-07 | End: 2022-03-07

## 2022-03-07 RX ADMIN — SODIUM CHLORIDE 250 ML: 9 INJECTION, SOLUTION INTRAVENOUS at 11:00

## 2022-03-07 RX ADMIN — SODIUM CHLORIDE 790 MG: 9 INJECTION, SOLUTION INTRAVENOUS at 11:34

## 2022-03-07 RX ADMIN — SODIUM CHLORIDE 200 MG: 9 INJECTION, SOLUTION INTRAVENOUS at 11:00

## 2022-03-07 RX ADMIN — Medication 500 UNITS: at 11:49

## 2022-03-07 NOTE — PROGRESS NOTES
Subjective   Holli Patel is a 56 y.o. female.  Referred by Dr. 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    3/1/2021-bilateral screening mammogram-microcalcifications seen in the posterior one third of the lateral aspect of the right breast.  Area of focal asymmetry seen in the middle one third of the upper inner quadrant of the left breast.    3/1/2021-DEXA scan-T score of -2.2 on the lumbar spine and T score of -2.3 in the left hip and T score of -1.9 in the right hip.  Findings consistent with osteopenia    3/23/2021-screening lung CT-there is a 10 x 11 mm solid nodule in the left upper lobe.  Enlarged AP window lymph node measuring 14 x 10 mm.  Suggestion of a possible 9 mm left hilar lymph node.  Heavy coronary artery calcification.    3/23/2021-diagnostic mammogram bilateral-cluster of microcalcifications in the middle third lateral aspect of the right breast.  Left breast demonstrates persistence of the area of focal asymmetry in the region.    Ultrasound-left breast ultrasound at 10 o'clock position, 6 cm from the nipple there is a 0.4 cm irregular hypoechoic lesion.  Stereotactic biopsy of the right breast calcifications recommended.  Ultrasound-guided biopsy of the left breast lesion recommended    4/7/2021-right breast stereotactic biopsy and left breast ultrasound-guided biopsy  Pathology  Right breast-invasive ductal carcinoma, grade 2, lymphovascular invasion present, ER +99% strong, NH +80% moderate, HER-2 negative, Ki-67 12%  Left breast upper inner quadrant 10 o'clock position biopsy, invasive ductal carcinoma, grade   2, ER +99% strong, NH +40% strong, HER-2 2+ on immunohistochemistry, nonamplified on FISH Ki-67 10%    4/14/2021-PET/CT-FDG avid aortopulmonary window lymph node measures 0.9 cm with an SUV of 7.5.  FDG avid left hilar lymph node  measures 1 cm with an SUV of 17.2.  Irregular soft tissue density in the right breast measuring 3.7 x 3.8 cm is favored to be secondary to the recent breast biopsy.  1.1 cm pulmonary nodule within the left upper lobe with SUV 9.7 .  Sub-6 mm pulmonary nodules are present in the right lower lobe.  No evidence of lymphadenopathy or metastatic disease in the abdomen.  Focal area of FDG uptake within the central canal posterior to T11-T12 displaced demonstrating an SUV of 5.5 thought to be reactive however MRI is recommended for further evaluation.    She was seen by Dr. Molina who initially planned for breast surgery however  due to the PET abnormalities she has been referred to Dr. Kerr who plans to do a wound on 4/30/2021.  Dr. Molina's and Dr. Kerr's notes reviewed.    Patient denies any family history of breast cancer.  Her mother had lymphoma.  Maternal grandmother had colon cancer.  Prior to that screening mammogram she did not have any palpable abnormalities of either breast.    She has been a heavy smoker for about 40 years and smoked 2 packs/day.  Denies any recent weight changes, new bone pains, cough, abdominal pain nausea vomiting constipation or diarrhea.  She does have anxiety and has been on several medications.  She has recently been started on Xanax to help with the mood and also with insomnia.    Patient had a video-assisted thoracoscopy and mediastinal lymphadenectomy on 4/30/2021.  L5-L6 lymph nodes were biopsied however the small pulmonary nodule in the left upper lobe was not difficult to find.  Pathology showed moderately differentiated adenocarcinoma which is CK7 and TTF-1 positive.  Consistent with lung primary.  Ki-67 85%.    5/14/2021-MRI of the brain-minimal chronic small vessel ischemic change in the white matter.  Otherwise negative MRI.    5/20/2021-bilateral axillary ultrasound-no evidence of axillary lymphadenopathy in either axilla.  Normal-appearing bilateral axillary lymph  nodes visualized.    Cycle 1 cisplatin and Alimta on 5/25/2021.    Cycle 2 cisplatin Alimta 6/15/2021    Cycle 3 cisplatin Alimta 7/7/2021    Completed radiation on 7/12/2021    Port was nonfunctional hence a port study was performed which showed that the port was backed up into the innominate vein and also there was a nonocclusive thrombus at the end of the catheter extending into the superior vena cava.  She was started on anticoagulation with Eliquis.  It was recommended for port revision of the intention is to keep the port.    PET/CT 8/5/2021-images independently reviewed and interpreted by me-decrease in size and FDG uptake of the left upper lobe pulmonary nodule as well as mediastinal and left hilar lymphadenopathy representing response to treatment.  Sub-6 mm pulmonary nodule in the left upper lobe new since 4/14/2021.  This could be related to radiation however follow-up CT in 3 months recommended.  Decrease in size of the irregular masslike tissue in the right breast which is postbiopsy hematoma.  This is not PET avid.  New segmental left eighth rib fractures healing.  A new band of sclerosis of the left seventh rib which favored to represent healing nondisplaced fracture.  Follow-up CT recommended.  focal uptake in the duodenum first and second portions.  Could be related to duodenitis.  Indeterminate lesion in the L1 vertebral body not well evaluated on PET/CT.    10/1/2021-MRI of the lumbar spine.  Lesion in the left posterior body of L1 measures 14 x 14 x 12 mm and previously 5 x 5 x 6 mm.  The interval enlargement strongly suggest that it is metastatic lesion.  No additional osseous metastasis noted.   Other chronic changes noted.    10/14/2021-biopsy of the lumbar spine lesion-pathology consistent with poorly differentiated carcinoma.  The staining is similar to the prior tumors and fevers this being metastatic poorly differentiated pulmonary adenocarcinoma.    10/26/2021-brain MRI-no evidence of  metastatic disease.    10/26/2021-bilateral diagnostic mammogram and ultrasound  Scattered fibroglandular density in both breast.  Postbiopsy hematoma with internal biopsy clip in the upper outer quadrant of the right breast, 8 cm from the nipple.  The hematoma size is decreased to 2.9 cm from 3.6 cm earlier.    Left breast-parenchymal density measuring 9 x 10 mm has markedly decreased.  Few adjacent calcifications which are unchanged.  No new abnormality in the left breast.  At 10:00 region there is some minimal adjacent hypoechoic texture which is difficult to measure but appears smaller since the previous exam.    10/28/2021-PET/CT  New L1 and L2 lytic bone metastasis annually intensely hypermetabolic focus at the left adrenal gland likely represents metastatic disease as well.  Slight decrease in size of the 0.9 x 0.7 cm left upper lobe pulmonary nodule.  There is hypermetabolic activity related to radiation pneumonitis.  The remainder of the nodule is photopenic.  Uncertain etiology of the more intense activity along the right lateral peripheral margin of the 2.6 cm postbiopsy density of the right breast.    She completed radiation to to the lumbar spine on 11/19/2021 11/22/2021-cycle 1 Alimta and Keytruda    Interval history:  Ms. White presents to the clinic today for follow-up.  She is scheduled for Alimta and Keytruda.  She has been struggling with puffiness of the eyelids and also started to have fluid retention in her upper and lower extremities.  She reports that the pain has been fairly well controlled.  Denies any nausea vomiting abdominal pain.  Reports a good appetite.  The right upper outer quadrant mass in the breast remains pretty much unchanged according to the patient.  Blood pressure is mildly elevated today.      The following portions of the patient's history were reviewed and updated as appropriate: allergies, current medications, past family history, past medical history, past social  history, past surgical history and problem list.    Past Medical History:   Diagnosis Date   • Anxiety    • Clot     POSSIBLE BLOOD CLOT IN VENOUS ACCESS DEVICE-PT STATES WAS STARTED ON ELIQUIS    • Dyspnea on exertion    • Elevated cholesterol    • Frequent urination     DAY AND NIGHT   • SHAYY (generalized anxiety disorder)     RELATED HIGH BLOOD PRESSURE   • GERD (gastroesophageal reflux disease)    • High blood pressure     ANXIETY RELATED   • Hyperlipidemia    • Hypothyroidism    • Lung cancer (HCC)    • Lung nodule     LEFT   • Malignant neoplasm of upper-outer quadrant of right breast in female, estrogen receptor positive (HCC) 4/13/2021    PT STATES HAS BILAT BREAST CANCER   • Migraine    • PONV (postoperative nausea and vomiting)         Past Surgical History:   Procedure Laterality Date   • BREAST BIOPSY Bilateral 04/07/2021    Right Breast 10 o'clock & Left breast 10 o'clock 6 cm from nipple, [facility]   • CLOSED REDUCTION HIP DISLOCATION Left 4/30/2021    Procedure: BRONCHOSCOPY, LEFT VIDEO ASSITED THORACOSCOPY, ROBOTIC ASSSITED MEDIASTINAL LYMPHADENECTOMY WITH INTERCOSTAL NERVE BLOCKS;  Surgeon: Raphael Kerr III, MD;  Location: [facility];  Service: DaVinci;  Laterality: Left;   • COLONOSCOPY     • TUBAL ABDOMINAL LIGATION     • VENOUS ACCESS DEVICE (PORT) INSERTION Right 5/20/2021    Procedure: INSERTION VENOUS ACCESS DEVICE;  Surgeon: Raphael Kerr III, MD;  Location: [facility] OR;  Service: Thoracic;  Laterality: Right;   • VENOUS ACCESS DEVICE (PORT) INSERTION Right 11/29/2021    Procedure: REVISION AND REPLACEMENT RIGHT SUBCLAVIAN VENOUS ACCESS PORT;  Surgeon: Raphael Kerr III, MD;  Location: [facility] MAIN OR;  Service: Thoracic;  Laterality: Right;   • WRIST SURGERY Right         Family History   Problem Relation Age of Onset   • Cancer Father         LYMPHATIC   • Prostate cancer Father    • Lymphoma Father    • Alcohol abuse Brother    • Colon cancer Maternal Grandmother    • Helder  "Hyperthermia Neg Hx         Social History     Socioeconomic History   • Marital status:      Spouse name: Jelani   Tobacco Use   • Smoking status: Former Smoker     Packs/day: 1.00     Years: 30.00     Pack years: 30.00     Types: Electronic Cigarette, Cigarettes     Start date: 1981     Quit date:      Years since quittin.1   • Smokeless tobacco: Never Used   • Tobacco comment: ABT 15 CIGARETTES DAILY   Vaping Use   • Vaping Use: Some days   • Substances: Nicotine, Flavoring   • Devices: Disposable   Substance and Sexual Activity   • Alcohol use: Never   • Drug use: Never   • Sexual activity: Yes     Partners: Male        OB History        2    Para   2    Term   2            AB        Living           SAB        IAB        Ectopic        Molar        Multiple        Live Births                Age of menarche-10  Age at menopause-42  Oral contraceptive pill use-15 years  No HRT use  She has 2 children  Age at first live childbirth-17    No Known Allergies     Review of Systems   Review of systems as mentioned in the HPI    Objective   Blood pressure (!) 154/103, pulse 83, temperature 96.4 °F (35.8 °C), temperature source Temporal, resp. rate 20, height 160 cm (62.99\"), weight 80.3 kg (177 lb 1.6 oz), SpO2 99 %, not currently breastfeeding.       Physical Exam  Vitals reviewed.   HENT:      Head: Normocephalic.   Eyes:      Extraocular Movements: Extraocular movements intact.      Conjunctiva/sclera: Conjunctivae normal.      Pupils: Pupils are equal, round, and reactive to light.   Cardiovascular:      Rate and Rhythm: Normal rate and regular rhythm.      Pulses: Normal pulses.      Heart sounds: Normal heart sounds.   Pulmonary:      Effort: Pulmonary effort is normal.      Breath sounds: Normal breath sounds.   Abdominal:      General: Abdomen is flat. Bowel sounds are normal. There is no distension.      Palpations: There is no mass.   Musculoskeletal:         General: No " swelling. Normal range of motion.      Cervical back: Normal range of motion.   Skin:     General: Skin is warm.   Neurological:      General: No focal deficit present.      Mental Status: She is alert and oriented to person, place, and time.   Psychiatric:         Mood and Affect: Mood normal.         Behavior: Behavior normal.     Breast Exam: Right breast with a large postbiopsy hematoma in the upper outer quadrant measuring 3x2 cm.    No other palpable abnormalities.  Left breast with no palpable abnormalities.  No right or left axillary lymphadenopathy(breast not examined today, 1/24/2022)    I have reexamined the patient and the results are consistent with the previously documented exam. Salma Snell MD     Results from last 7 days   Lab Units 03/07/22  0904   WBC 10*3/mm3 11.93*   NEUTROS ABS 10*3/mm3 9.26*   HEMOGLOBIN g/dL 8.4*   HEMATOCRIT % 27.9*   PLATELETS 10*3/mm3 383               No radiology results for the last 30 days.  1/24/2022 CBC reviewed and stable WBC of 7.5, hemoglobin 9.6 and platelets 354,000  CMP reviewed and potassium 3.3 but otherwise within normal limits.    Assessment/Plan      *Bilateral breast cancer  · Invasive ductal carcinoma in both right and left breast and lesions measure under 1 cm.  No palpable lymphadenopathy although unsure if this has been evaluated by an axillary ultrasound.  No abnormal axillary lymphadenopathy noted on PET.  · Most likely clinical T1b N0 M0, both cancers were noted to be grade 2, ER/AR positive and HER-2 negative and Ki-67 less than 15%  VATS on 4/30/2021  Biopsy confirms adenocarcinoma of pulmonary primary  Discussed with Dr. Molina about delaying breast surgery and she is in agreement  Darted on neoadjuvant anastrozole  · Patient experienced significant adverse effects with anastrozole  · Treatment changed to letrozole   · Axillary ultrasound 4/20/2021 -negative for any axillary lymphadenopathy  · Started letrozole in May 2021  · She continues  on letrozole without any problems.  · 8/24/2021 bilateral diagnostic mammogram and ultrasound.  In the left breast the tumor now measures 1 cm on mammogram as opposed to 0.8 cm in March.  Right breast calcifications also noted to be in a large extent although the number seems to be decreased.  Hematoma has decreased in size.  · 10/26/2021-bilateral diagnostic mammogram and ultrasound-decrease in size of the bilateral breast lesions.  · 10/26/2021-PET/CT-metastatic lesions in the left adrenal gland, L1-L2.  · 10/26/2021-MRI of the brain-no evidence of metastatic disease  · Physical exam shows decrease in size of the right breast hematoma.  No new palpable abnormalities of the left breast  · We will obtain bilateral diagnostic mammogram and ultrasound prior to next visit.      *Adenocarcinoma of the left lung, metastatic   · Most likely stage T1 N2 M0, stage III  · MRI of the thoracic spine ordered to further evaluate the abnormality seen on PET  · MRI of the brain negative   · Given that she is only 55 and fairly good performance status I discussed with her by with concurrent chemoradiation with cisplatin and Alimta every 3 weeks followed by durvalumab.  · Adverse effects including but not limited to myelosuppression, increased risk of infections, nausea, vomiting, renal dysfunction, ototoxicity, neurotoxicity have been discussed at length.  · MRI of the thoracic spine performed 5/24/2021 shows no evidence of metastatic disease in the thoracic spine however in L1 there is a 7 mm lesion which is indeterminate.  A lumbar spine MRI has been recommended.  · Discussed her case with Dr. Oates and he feels like MRI of the lumbar spine may also show that this lesion is indeterminate.  · Discussed the same with the patient and her .  · Even if this is metastatic disease this might be the only lesion of metastatic disease and then would consider this oligometastatic disease.  · Therefore plan to proceed with concurrent  chemoradiation as scheduled.  · cisplatin and Alimta, C1D1 5/25/2021  · Day 1 of radiation 5/25/2021  · 5/27/2021-MRI of the lumbar spine-there is a 6 x 5 x 6 mm enhancing lesion in the left posterior body of L1 vertebra.  This is considered indeterminate.  Follow-up recommended.  Degenerative changes noted at L5-S1  · 7/7/2021, cycle 3 cisplatin Alimta  · 7/12/2021-radiation completed  · PET/CT 8/6/2021 with good response to chemoradiation.  · MRI of the lumbar spine 10/1/2021 with increase in size of the L1 lesion which now measures 14 mm as opposed to 6 mm in May 2021.  · 10/14/2021-biopsy of the L1 lesion and pathology consistent with adenocarcinoma of the lung, TPS 0  · 0/26/2021-PET/CT-metastatic lesions in the left adrenal gland, L1-L2.  · 10/26/2021-MRI of the brain-no evidence of metastatic disease  · Given that she only has metastatic disease in L1-L2 and left adrenal gland I think it would be possible to radiate all these regions.  · Completed radiation to L1-L2 on 11/19/2021  · 11/22/2021-cycle 1 of Keytruda and Alimta  · Completed radiation to the left adrenal gland  · 1/18/2022-PET/CT-images independently reviewed and interpreted by me.  Decrease in size of the 7 mm pulmonary nodule in the left upper lobe which previously measured 9 mm.  Radiation pneumonitis is nearly resolved.  Hypermetabolic activity at the L1-L2 metastasis has resolved.  No new suspicious metastatic disease.  · Guardant 360 circulating DNA level decreasing.  · 3/7/2022-patient is complaining of fluid retention.  · This is likely secondary to Alimta  · Prescribe steroids to help with this however this has not helped  · Decrease Alimta dose by 15+    *Fluid retention  · Severe retain fluid around her eyes and also in her extremities resulting in dermatitis  · Weight has increased by 6 pounds in the past month  · We will start Lasix 20 mg    *Bilateral breast cancer-no family history of breast cancer but given synchronous breast  cancer genetic testing has been sent.  Genetic testing with a variant of unknown significance.    *Right hand numbness and difficulty with grasping objects  · Previously had history of carpal tunnel requiring repair.  · She is currently on letrozole which could cause worsening of the carpal tunnel  · Gabapentin dose will be increased to 600 mg 3 times daily  · She will also be referred to hand surgery for further evaluation.  · She now reports bilateral upper extremity numbness.  · Continue gabapentin  · MRI of the brain 10/26/2021 without any evidence of metastatic disease  · Not an issue today    *Anxiety-  · Currently on Xanax 1 mg nightly  · She required Valium for the MRI as she had claustrophobia and a panic attack  · She started Zyprexa about 2 weeks ago.  · Anxiety seems to be well controlled.    *Left-sided chest wall pain-secondary to VATS.  Most likely neuropathic.  Continue gabapentin and Norco as needed.  She ran out of gabapentin and experienced leg cramps.    Continue gabapentin  Improved      *Hypertension-continue lisinopril.  Blood pressure elevated at 155/102.  Recheck at the end of the visit    *Smoking-not addressed today    *GERD  · 7/7/2021, patient complains of reflux despite the use of Prilosec 20 mg twice daily.  We will send her a prescription for Carafate slurry 4 times daily.  · She has not quite started using the Carafate.  · Continue Prilosec and Carafate  · PET/CT shows duodenitis    *Port not returning blood    · A new Mediport was placed by thoracic surgery 11/29/2021, Eliquis has been discontinued and right chest Mediport is now safe to use.      PLAN:  1. Proceed today with Keytruda Alimta  2. Dose reduce Alimta by 15%  3. Continue steroids  4. Start Lasix 20 mg  5. Follow-up with supportive oncology  6. B12 injection  7. Continue PPI   8. bilateral diagnostic mammogram   9. follow-up in 3 weeks      Patient is on treatment requiring close monitoring for toxicities.  She is having  severe dose-limiting toxicities in terms of fluid retention.  Dose adjustment performed today.    Salma Snell MD

## 2022-03-07 NOTE — NURSING NOTE
B/P 156/94 post tx. Reviewed with pt. She is asymptomatic. She takes Lisinopril which is prescribed by her PCP. She has taken her dose today. She will monitor her B/P at home and will follow up with her PCP if it remains elevated.

## 2022-03-18 RX ORDER — OLANZAPINE 5 MG/1
5 TABLET ORAL NIGHTLY
Qty: 30 TABLET | Refills: 1 | Status: SHIPPED | OUTPATIENT
Start: 2022-03-18 | End: 2022-05-19

## 2022-03-23 ENCOUNTER — HOSPITAL ENCOUNTER (OUTPATIENT)
Dept: MAMMOGRAPHY | Facility: HOSPITAL | Age: 57
Discharge: HOME | End: 2022-03-23

## 2022-03-23 ENCOUNTER — HOSPITAL ENCOUNTER (OUTPATIENT)
Dept: ULTRASOUND IMAGING | Facility: HOSPITAL | Age: 57
Discharge: HOME | End: 2022-03-23

## 2022-03-23 DIAGNOSIS — C50.411 MALIGNANT NEOPLASM OF UPPER-OUTER QUADRANT OF RIGHT BREAST IN FEMALE, ESTROGEN RECEPTOR POSITIVE: ICD-10-CM

## 2022-03-23 DIAGNOSIS — Z17.0 MALIGNANT NEOPLASM OF UPPER-OUTER QUADRANT OF RIGHT BREAST IN FEMALE, ESTROGEN RECEPTOR POSITIVE: ICD-10-CM

## 2022-03-23 PROCEDURE — G0279 TOMOSYNTHESIS, MAMMO: HCPCS

## 2022-03-23 PROCEDURE — 77066 DX MAMMO INCL CAD BI: CPT

## 2022-03-23 PROCEDURE — 76642 ULTRASOUND BREAST LIMITED: CPT

## 2022-03-28 ENCOUNTER — OFFICE VISIT (OUTPATIENT)
Dept: ONCOLOGY | Facility: CLINIC | Age: 57
End: 2022-03-28

## 2022-03-28 ENCOUNTER — INFUSION (OUTPATIENT)
Dept: ONCOLOGY | Facility: HOSPITAL | Age: 57
End: 2022-03-28

## 2022-03-28 VITALS
RESPIRATION RATE: 16 BRPM | HEIGHT: 63 IN | HEART RATE: 74 BPM | WEIGHT: 171.7 LBS | DIASTOLIC BLOOD PRESSURE: 75 MMHG | TEMPERATURE: 97.1 F | OXYGEN SATURATION: 97 % | SYSTOLIC BLOOD PRESSURE: 113 MMHG | BODY MASS INDEX: 30.42 KG/M2

## 2022-03-28 DIAGNOSIS — C34.90 NON-SMALL CELL LUNG CANCER METASTATIC TO BONE: ICD-10-CM

## 2022-03-28 DIAGNOSIS — C34.12 PRIMARY ADENOCARCINOMA OF UPPER LOBE OF LEFT LUNG: ICD-10-CM

## 2022-03-28 DIAGNOSIS — C34.12 PRIMARY ADENOCARCINOMA OF UPPER LOBE OF LEFT LUNG: Primary | ICD-10-CM

## 2022-03-28 DIAGNOSIS — Z45.2 ENCOUNTER FOR FITTING AND ADJUSTMENT OF VASCULAR CATHETER: ICD-10-CM

## 2022-03-28 DIAGNOSIS — C50.411 MALIGNANT NEOPLASM OF UPPER-OUTER QUADRANT OF RIGHT BREAST IN FEMALE, ESTROGEN RECEPTOR POSITIVE: ICD-10-CM

## 2022-03-28 DIAGNOSIS — C79.51 NON-SMALL CELL LUNG CANCER METASTATIC TO BONE: ICD-10-CM

## 2022-03-28 DIAGNOSIS — Z17.0 MALIGNANT NEOPLASM OF UPPER-OUTER QUADRANT OF RIGHT BREAST IN FEMALE, ESTROGEN RECEPTOR POSITIVE: ICD-10-CM

## 2022-03-28 LAB
ALBUMIN SERPL-MCNC: 4 G/DL (ref 3.5–5.2)
ALBUMIN/GLOB SERPL: 1.3 G/DL (ref 1.1–2.4)
ALP SERPL-CCNC: 130 U/L (ref 38–116)
ALT SERPL W P-5'-P-CCNC: 13 U/L (ref 0–33)
ANION GAP SERPL CALCULATED.3IONS-SCNC: 11.2 MMOL/L (ref 5–15)
AST SERPL-CCNC: 20 U/L (ref 0–32)
BASOPHILS # BLD AUTO: 0.06 10*3/MM3 (ref 0–0.2)
BASOPHILS NFR BLD AUTO: 0.7 % (ref 0–1.5)
BILIRUB SERPL-MCNC: <0.2 MG/DL (ref 0.2–1.2)
BUN SERPL-MCNC: 30 MG/DL (ref 6–20)
BUN/CREAT SERPL: 14.6 (ref 7.3–30)
CALCIUM SPEC-SCNC: 9 MG/DL (ref 8.5–10.2)
CHLORIDE SERPL-SCNC: 105 MMOL/L (ref 98–107)
CO2 SERPL-SCNC: 24.8 MMOL/L (ref 22–29)
CREAT SERPL-MCNC: 2.05 MG/DL (ref 0.6–1.1)
DEPRECATED RDW RBC AUTO: 76.7 FL (ref 37–54)
EGFRCR SERPLBLD CKD-EPI 2021: 27.7 ML/MIN/1.73
EOSINOPHIL # BLD AUTO: 0.95 10*3/MM3 (ref 0–0.4)
EOSINOPHIL NFR BLD AUTO: 10.4 % (ref 0.3–6.2)
ERYTHROCYTE [DISTWIDTH] IN BLOOD BY AUTOMATED COUNT: 21.2 % (ref 12.3–15.4)
GLOBULIN UR ELPH-MCNC: 3.1 GM/DL (ref 1.8–3.5)
GLUCOSE SERPL-MCNC: 96 MG/DL (ref 74–124)
HCT VFR BLD AUTO: 25.9 % (ref 34–46.6)
HGB BLD-MCNC: 7.9 G/DL (ref 12–15.9)
IMM GRANULOCYTES # BLD AUTO: 0.24 10*3/MM3 (ref 0–0.05)
IMM GRANULOCYTES NFR BLD AUTO: 2.6 % (ref 0–0.5)
LYMPHOCYTES # BLD AUTO: 1.05 10*3/MM3 (ref 0.7–3.1)
LYMPHOCYTES NFR BLD AUTO: 11.5 % (ref 19.6–45.3)
MCH RBC QN AUTO: 31.5 PG (ref 26.6–33)
MCHC RBC AUTO-ENTMCNC: 30.5 G/DL (ref 31.5–35.7)
MCV RBC AUTO: 103.2 FL (ref 79–97)
MONOCYTES # BLD AUTO: 1.48 10*3/MM3 (ref 0.1–0.9)
MONOCYTES NFR BLD AUTO: 16.2 % (ref 5–12)
NEUTROPHILS NFR BLD AUTO: 5.38 10*3/MM3 (ref 1.7–7)
NEUTROPHILS NFR BLD AUTO: 58.6 % (ref 42.7–76)
NRBC BLD AUTO-RTO: 0 /100 WBC (ref 0–0.2)
PLATELET # BLD AUTO: 425 10*3/MM3 (ref 140–450)
PMV BLD AUTO: 9 FL (ref 6–12)
POTASSIUM SERPL-SCNC: 4.5 MMOL/L (ref 3.5–4.7)
PROT SERPL-MCNC: 7.1 G/DL (ref 6.3–8)
RBC # BLD AUTO: 2.51 10*6/MM3 (ref 3.77–5.28)
SODIUM SERPL-SCNC: 141 MMOL/L (ref 134–145)
T4 FREE SERPL-MCNC: 1.13 NG/DL (ref 0.93–1.7)
TSH SERPL DL<=0.05 MIU/L-ACNC: 2.25 UIU/ML (ref 0.27–4.2)
WBC NRBC COR # BLD: 9.16 10*3/MM3 (ref 3.4–10.8)

## 2022-03-28 PROCEDURE — 25010000002 HEPARIN LOCK FLUSH PER 10 UNITS: Performed by: INTERNAL MEDICINE

## 2022-03-28 PROCEDURE — 96413 CHEMO IV INFUSION 1 HR: CPT

## 2022-03-28 PROCEDURE — 96361 HYDRATE IV INFUSION ADD-ON: CPT

## 2022-03-28 PROCEDURE — 25010000002 PEMETREXED 100 MG RECONSTITUTED SOLUTION 100 MG VIAL: Performed by: INTERNAL MEDICINE

## 2022-03-28 PROCEDURE — 85025 COMPLETE CBC W/AUTO DIFF WBC: CPT

## 2022-03-28 PROCEDURE — 25010000002 PEMETREXED PER 10 MG: Performed by: INTERNAL MEDICINE

## 2022-03-28 PROCEDURE — 25010000002 PEMBROLIZUMAB 100 MG/4ML SOLUTION 4 ML VIAL: Performed by: INTERNAL MEDICINE

## 2022-03-28 PROCEDURE — 99215 OFFICE O/P EST HI 40 MIN: CPT | Performed by: INTERNAL MEDICINE

## 2022-03-28 PROCEDURE — 84439 ASSAY OF FREE THYROXINE: CPT | Performed by: INTERNAL MEDICINE

## 2022-03-28 PROCEDURE — 80053 COMPREHEN METABOLIC PANEL: CPT

## 2022-03-28 PROCEDURE — 96411 CHEMO IV PUSH ADDL DRUG: CPT

## 2022-03-28 PROCEDURE — 84443 ASSAY THYROID STIM HORMONE: CPT | Performed by: INTERNAL MEDICINE

## 2022-03-28 RX ORDER — ACETAMINOPHEN 325 MG/1
650 TABLET ORAL ONCE
Status: CANCELLED | OUTPATIENT
Start: 2022-04-01 | End: 2022-03-28

## 2022-03-28 RX ORDER — HEPARIN SODIUM (PORCINE) LOCK FLUSH IV SOLN 100 UNIT/ML 100 UNIT/ML
500 SOLUTION INTRAVENOUS AS NEEDED
Status: DISCONTINUED | OUTPATIENT
Start: 2022-03-28 | End: 2022-03-28 | Stop reason: HOSPADM

## 2022-03-28 RX ORDER — SODIUM CHLORIDE 9 MG/ML
1000 INJECTION, SOLUTION INTRAVENOUS ONCE
Status: COMPLETED | OUTPATIENT
Start: 2022-03-28 | End: 2022-03-28

## 2022-03-28 RX ORDER — SODIUM CHLORIDE 9 MG/ML
250 INJECTION, SOLUTION INTRAVENOUS ONCE
Status: COMPLETED | OUTPATIENT
Start: 2022-03-28 | End: 2022-03-28

## 2022-03-28 RX ORDER — HEPARIN SODIUM (PORCINE) LOCK FLUSH IV SOLN 100 UNIT/ML 100 UNIT/ML
500 SOLUTION INTRAVENOUS AS NEEDED
Status: CANCELLED | OUTPATIENT
Start: 2022-03-28

## 2022-03-28 RX ORDER — SODIUM CHLORIDE 9 MG/ML
250 INJECTION, SOLUTION INTRAVENOUS AS NEEDED
Status: CANCELLED | OUTPATIENT
Start: 2022-04-01

## 2022-03-28 RX ORDER — DIPHENHYDRAMINE HCL 25 MG
25 CAPSULE ORAL ONCE
Status: CANCELLED | OUTPATIENT
Start: 2022-04-01 | End: 2022-03-28

## 2022-03-28 RX ORDER — SODIUM CHLORIDE 9 MG/ML
250 INJECTION, SOLUTION INTRAVENOUS ONCE
Status: CANCELLED | OUTPATIENT
Start: 2022-03-28

## 2022-03-28 RX ORDER — SODIUM CHLORIDE 9 MG/ML
1000 INJECTION, SOLUTION INTRAVENOUS ONCE
Status: CANCELLED
Start: 2022-03-28 | End: 2022-03-28

## 2022-03-28 RX ORDER — SODIUM CHLORIDE 0.9 % (FLUSH) 0.9 %
10 SYRINGE (ML) INJECTION AS NEEDED
Status: DISCONTINUED | OUTPATIENT
Start: 2022-03-28 | End: 2022-03-28 | Stop reason: HOSPADM

## 2022-03-28 RX ORDER — SODIUM CHLORIDE 0.9 % (FLUSH) 0.9 %
10 SYRINGE (ML) INJECTION AS NEEDED
Status: CANCELLED | OUTPATIENT
Start: 2022-03-28

## 2022-03-28 RX ADMIN — SODIUM CHLORIDE 250 ML: 9 INJECTION, SOLUTION INTRAVENOUS at 09:56

## 2022-03-28 RX ADMIN — Medication 10 ML: at 12:11

## 2022-03-28 RX ADMIN — Medication 500 UNITS: at 12:11

## 2022-03-28 RX ADMIN — SODIUM CHLORIDE 790 MG: 9 INJECTION, SOLUTION INTRAVENOUS at 11:54

## 2022-03-28 RX ADMIN — SODIUM CHLORIDE 200 MG: 9 INJECTION, SOLUTION INTRAVENOUS at 11:20

## 2022-03-28 RX ADMIN — SODIUM CHLORIDE 1000 ML: 9 INJECTION, SOLUTION INTRAVENOUS at 09:57

## 2022-03-28 NOTE — PROGRESS NOTES
Subjective   Holli Patel is a 56 y.o. female.  Referred by Dr. 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    3/1/2021-bilateral screening mammogram-microcalcifications seen in the posterior one third of the lateral aspect of the right breast.  Area of focal asymmetry seen in the middle one third of the upper inner quadrant of the left breast.    3/1/2021-DEXA scan-T score of -2.2 on the lumbar spine and T score of -2.3 in the left hip and T score of -1.9 in the right hip.  Findings consistent with osteopenia    3/23/2021-screening lung CT-there is a 10 x 11 mm solid nodule in the left upper lobe.  Enlarged AP window lymph node measuring 14 x 10 mm.  Suggestion of a possible 9 mm left hilar lymph node.  Heavy coronary artery calcification.    3/23/2021-diagnostic mammogram bilateral-cluster of microcalcifications in the middle third lateral aspect of the right breast.  Left breast demonstrates persistence of the area of focal asymmetry in the region.    Ultrasound-left breast ultrasound at 10 o'clock position, 6 cm from the nipple there is a 0.4 cm irregular hypoechoic lesion.  Stereotactic biopsy of the right breast calcifications recommended.  Ultrasound-guided biopsy of the left breast lesion recommended    4/7/2021-right breast stereotactic biopsy and left breast ultrasound-guided biopsy  Pathology  Right breast-invasive ductal carcinoma, grade 2, lymphovascular invasion present, ER +99% strong, MA +80% moderate, HER-2 negative, Ki-67 12%  Left breast upper inner quadrant 10 o'clock position biopsy, invasive ductal carcinoma, grade   2, ER +99% strong, MA +40% strong, HER-2 2+ on immunohistochemistry, nonamplified on FISH Ki-67 10%    4/14/2021-PET/CT-FDG avid aortopulmonary window lymph node measures 0.9 cm with an SUV of 7.5.  FDG avid left hilar lymph node  measures 1 cm with an SUV of 17.2.  Irregular soft tissue density in the right breast measuring 3.7 x 3.8 cm is favored to be secondary to the recent breast biopsy.  1.1 cm pulmonary nodule within the left upper lobe with SUV 9.7 .  Sub-6 mm pulmonary nodules are present in the right lower lobe.  No evidence of lymphadenopathy or metastatic disease in the abdomen.  Focal area of FDG uptake within the central canal posterior to T11-T12 displaced demonstrating an SUV of 5.5 thought to be reactive however MRI is recommended for further evaluation.    She was seen by Dr. Molina who initially planned for breast surgery however  due to the PET abnormalities she has been referred to Dr. Kerr who plans to do a wound on 4/30/2021.  Dr. Molina's and Dr. Kerr's notes reviewed.    Patient denies any family history of breast cancer.  Her mother had lymphoma.  Maternal grandmother had colon cancer.  Prior to that screening mammogram she did not have any palpable abnormalities of either breast.    She has been a heavy smoker for about 40 years and smoked 2 packs/day.  Denies any recent weight changes, new bone pains, cough, abdominal pain nausea vomiting constipation or diarrhea.  She does have anxiety and has been on several medications.  She has recently been started on Xanax to help with the mood and also with insomnia.    Patient had a video-assisted thoracoscopy and mediastinal lymphadenectomy on 4/30/2021.  L5-L6 lymph nodes were biopsied however the small pulmonary nodule in the left upper lobe was not difficult to find.  Pathology showed moderately differentiated adenocarcinoma which is CK7 and TTF-1 positive.  Consistent with lung primary.  Ki-67 85%.    5/14/2021-MRI of the brain-minimal chronic small vessel ischemic change in the white matter.  Otherwise negative MRI.    5/20/2021-bilateral axillary ultrasound-no evidence of axillary lymphadenopathy in either axilla.  Normal-appearing bilateral axillary lymph  nodes visualized.    Cycle 1 cisplatin and Alimta on 5/25/2021.    Cycle 2 cisplatin Alimta 6/15/2021    Cycle 3 cisplatin Alimta 7/7/2021    Completed radiation on 7/12/2021    Port was nonfunctional hence a port study was performed which showed that the port was backed up into the innominate vein and also there was a nonocclusive thrombus at the end of the catheter extending into the superior vena cava.  She was started on anticoagulation with Eliquis.  It was recommended for port revision of the intention is to keep the port.    PET/CT 8/5/2021-images independently reviewed and interpreted by me-decrease in size and FDG uptake of the left upper lobe pulmonary nodule as well as mediastinal and left hilar lymphadenopathy representing response to treatment.  Sub-6 mm pulmonary nodule in the left upper lobe new since 4/14/2021.  This could be related to radiation however follow-up CT in 3 months recommended.  Decrease in size of the irregular masslike tissue in the right breast which is postbiopsy hematoma.  This is not PET avid.  New segmental left eighth rib fractures healing.  A new band of sclerosis of the left seventh rib which favored to represent healing nondisplaced fracture.  Follow-up CT recommended.  focal uptake in the duodenum first and second portions.  Could be related to duodenitis.  Indeterminate lesion in the L1 vertebral body not well evaluated on PET/CT.    10/1/2021-MRI of the lumbar spine.  Lesion in the left posterior body of L1 measures 14 x 14 x 12 mm and previously 5 x 5 x 6 mm.  The interval enlargement strongly suggest that it is metastatic lesion.  No additional osseous metastasis noted.   Other chronic changes noted.    10/14/2021-biopsy of the lumbar spine lesion-pathology consistent with poorly differentiated carcinoma.  The staining is similar to the prior tumors and fevers this being metastatic poorly differentiated pulmonary adenocarcinoma.    10/26/2021-brain MRI-no evidence of  metastatic disease.    10/26/2021-bilateral diagnostic mammogram and ultrasound  Scattered fibroglandular density in both breast.  Postbiopsy hematoma with internal biopsy clip in the upper outer quadrant of the right breast, 8 cm from the nipple.  The hematoma size is decreased to 2.9 cm from 3.6 cm earlier.    Left breast-parenchymal density measuring 9 x 10 mm has markedly decreased.  Few adjacent calcifications which are unchanged.  No new abnormality in the left breast.  At 10:00 region there is some minimal adjacent hypoechoic texture which is difficult to measure but appears smaller since the previous exam.    10/28/2021-PET/CT  New L1 and L2 lytic bone metastasis annually intensely hypermetabolic focus at the left adrenal gland likely represents metastatic disease as well.  Slight decrease in size of the 0.9 x 0.7 cm left upper lobe pulmonary nodule.  There is hypermetabolic activity related to radiation pneumonitis.  The remainder of the nodule is photopenic.  Uncertain etiology of the more intense activity along the right lateral peripheral margin of the 2.6 cm postbiopsy density of the right breast.    She completed radiation to to the lumbar spine on 11/19/2021 11/22/2021-cycle 1 Alimta and Keytruda    3/23/2022-bilateral diagnostic mammogram and ultrasound  Complete resolution of the microcalcifications in the upper outer quadrant of the right breast and decrease in size of the postbiopsy hematoma.  No suspicious abnormalities in the right breast.  Benign-appearing calcifications at the site of malignancy in the left breast upper inner quadrant.  No other suspicious findings.    Interval history:  Ms. Patel presents to the clinic today for follow-up accompanied by her .  The bilateral periorbital edema has improved with the Lasix.  She continues to feel fatigued and has dyspnea on exertion.  No lightheadedness or dizziness.        The following portions of the patient's history were reviewed  and updated as appropriate: allergies, current medications, past family history, past medical history, past social history, past surgical history and problem list.    Past Medical History:   Diagnosis Date   • Anxiety    • Clot     POSSIBLE BLOOD CLOT IN VENOUS ACCESS DEVICE-PT STATES WAS STARTED ON ELIQUIS    • Dyspnea on exertion    • Elevated cholesterol    • Frequent urination     DAY AND NIGHT   • SHAYY (generalized anxiety disorder)     RELATED HIGH BLOOD PRESSURE   • GERD (gastroesophageal reflux disease)    • High blood pressure     ANXIETY RELATED   • Hyperlipidemia    • Hypothyroidism    • Lung cancer (HCC)    • Lung nodule     LEFT   • Malignant neoplasm of upper-outer quadrant of right breast in female, estrogen receptor positive (HCC) 4/13/2021    PT STATES HAS BILAT BREAST CANCER   • Migraine    • PONV (postoperative nausea and vomiting)         Past Surgical History:   Procedure Laterality Date   • BREAST BIOPSY Bilateral 04/07/2021    Right Breast 10 o'clock & Left breast 10 o'clock 6 cm from nipple, [facility]   • CLOSED REDUCTION HIP DISLOCATION Left 4/30/2021    Procedure: BRONCHOSCOPY, LEFT VIDEO ASSITED THORACOSCOPY, ROBOTIC ASSSITED MEDIASTINAL LYMPHADENECTOMY WITH INTERCOSTAL NERVE BLOCKS;  Surgeon: Raphael Kerr III, MD;  Location: [facility];  Service: DaVinci;  Laterality: Left;   • COLONOSCOPY     • TUBAL ABDOMINAL LIGATION     • VENOUS ACCESS DEVICE (PORT) INSERTION Right 5/20/2021    Procedure: INSERTION VENOUS ACCESS DEVICE;  Surgeon: Raphael Kerr III, MD;  Location: [facility] OR;  Service: Thoracic;  Laterality: Right;   • VENOUS ACCESS DEVICE (PORT) INSERTION Right 11/29/2021    Procedure: REVISION AND REPLACEMENT RIGHT SUBCLAVIAN VENOUS ACCESS PORT;  Surgeon: Raphael Kerr III, MD;  Location: [facility] OR;  Service: Thoracic;  Laterality: Right;   • WRIST SURGERY Right         Family History   Problem Relation Age of Onset   • Cancer Father         LYMPHATIC   • Prostate  "cancer Father    • Lymphoma Father    • Alcohol abuse Brother    • Colon cancer Maternal Grandmother    • Malig Hyperthermia Neg Hx         Social History     Socioeconomic History   • Marital status:      Spouse name: Jelani   Tobacco Use   • Smoking status: Former Smoker     Packs/day: 1.00     Years: 30.00     Pack years: 30.00     Types: Electronic Cigarette, Cigarettes     Start date: 1981     Quit date:      Years since quittin.2   • Smokeless tobacco: Never Used   • Tobacco comment: ABT 15 CIGARETTES DAILY   Vaping Use   • Vaping Use: Some days   • Substances: Nicotine, Flavoring   • Devices: Disposable   Substance and Sexual Activity   • Alcohol use: Never   • Drug use: Never   • Sexual activity: Yes     Partners: Male        OB History        2    Para   2    Term   2            AB        Living           SAB        IAB        Ectopic        Molar        Multiple        Live Births                Age of menarche-10  Age at menopause-42  Oral contraceptive pill use-15 years  No HRT use  She has 2 children  Age at first live childbirth-17    No Known Allergies     Review of Systems   Review of systems as mentioned in the HPI    Objective   Blood pressure 113/75, pulse 74, temperature 97.1 °F (36.2 °C), temperature source Temporal, resp. rate 16, height 160 cm (62.99\"), weight 77.9 kg (171 lb 11.2 oz), SpO2 97 %, not currently breastfeeding.       Physical Exam  Vitals reviewed.   HENT:      Head: Normocephalic.   Eyes:      Extraocular Movements: Extraocular movements intact.      Conjunctiva/sclera: Conjunctivae normal.      Pupils: Pupils are equal, round, and reactive to light.   Cardiovascular:      Rate and Rhythm: Normal rate and regular rhythm.      Pulses: Normal pulses.      Heart sounds: Normal heart sounds.   Pulmonary:      Effort: Pulmonary effort is normal.      Breath sounds: Normal breath sounds.   Abdominal:      General: Abdomen is flat. Bowel sounds are " normal. There is no distension.      Palpations: There is no mass.   Musculoskeletal:         General: No swelling. Normal range of motion.      Cervical back: Normal range of motion.   Skin:     General: Skin is warm.   Neurological:      General: No focal deficit present.      Mental Status: She is alert and oriented to person, place, and time.   Psychiatric:         Mood and Affect: Mood normal.         Behavior: Behavior normal.     Breast Exam: Right breast with a large postbiopsy hematoma in the upper outer quadrant measuring 3x2 cm.    No other palpable abnormalities.  Left breast with no palpable abnormalities.  No right or left axillary lymphadenopathy(breast not examined today, 1/24/2022)    I have reexamined the patient and the results are consistent with the previously documented exam. Salma Snell MD     Results from last 7 days   Lab Units 03/28/22  0752   WBC 10*3/mm3 9.16   NEUTROS ABS 10*3/mm3 5.38   HEMOGLOBIN g/dL 7.9*   HEMATOCRIT % 25.9*   PLATELETS 10*3/mm3 425               US Breast Left Limited    Result Date: 3/24/2022  1. There has been interval resolution of all microcalcifications from the site of biopsy-proven malignancy in the upper-outer quadrant of the right breast. Slight decrease in size of the postbiopsy hematoma and/or area of evolving fat necrosis at the site of biopsy-proven malignancy is noted. No new suspicious findings in the right breast are visualized. Mammographically, the imaging features suggest imaging complete response to neoadjuvant chemotherapy. Continued clinical and imaging follow-up as clinically indicated is recommended. 2. There are benign-appearing calcifications in association with the biopsy clip at the site of biopsy-proven malignancy in the upper inner quadrant of the left breast. No evidence for a residual portion of the malignancy at the site of biopsy-proven malignancy is appreciated. No additional or new suspicious findings in the left breast are  appreciated. A nonspecific 0.2 cm area of focal asymmetry/increased density is seen posterior to the biopsy clip in the upper inner quadrant of the left breast. Mammographically and sonographically the imaging features suggest near-complete response to medical therapy. Clinical follow-up and imaging follow-up as clinically indicated is recommended.  BI-RADS Category 6: Known malignancy.  This report was finalized on 3/24/2022 2:46 PM by Dr. Jeffrey Quevedo M.D.      Mammo Diagnostic Digital Tomosynthesis Bilateral With CAD    Result Date: 3/24/2022  1. There has been interval resolution of all microcalcifications from the site of biopsy-proven malignancy in the upper-outer quadrant of the right breast. Slight decrease in size of the postbiopsy hematoma and/or area of evolving fat necrosis at the site of biopsy-proven malignancy is noted. No new suspicious findings in the right breast are visualized. Mammographically, the imaging features suggest imaging complete response to neoadjuvant chemotherapy. Continued clinical and imaging follow-up as clinically indicated is recommended. 2. There are benign-appearing calcifications in association with the biopsy clip at the site of biopsy-proven malignancy in the upper inner quadrant of the left breast. No evidence for a residual portion of the malignancy at the site of biopsy-proven malignancy is appreciated. No additional or new suspicious findings in the left breast are appreciated. A nonspecific 0.2 cm area of focal asymmetry/increased density is seen posterior to the biopsy clip in the upper inner quadrant of the left breast. Mammographically and sonographically the imaging features suggest near-complete response to medical therapy. Clinical follow-up and imaging follow-up as clinically indicated is recommended.  BI-RADS Category 6: Known malignancy.  This report was finalized on 3/24/2022 2:46 PM by Dr. Jeffrey Quevedo M.D.      1/24/2022 CBC reviewed and stable WBC of  7.5, hemoglobin 9.6 and platelets 354,000  CMP reviewed and potassium 3.3 but otherwise within normal limits.    Bilateral diagnostic mammogram and ultrasound reviewed and summarized in HPI    Assessment/Plan      *Bilateral breast cancer  · Invasive ductal carcinoma in both right and left breast and lesions measure under 1 cm.  No palpable lymphadenopathy although unsure if this has been evaluated by an axillary ultrasound.  No abnormal axillary lymphadenopathy noted on PET.  · Most likely clinical T1b N0 M0, both cancers were noted to be grade 2, ER/TN positive and HER-2 negative and Ki-67 less than 15%  VATS on 4/30/2021  Biopsy confirms adenocarcinoma of pulmonary primary  Discussed with Dr. Molina about delaying breast surgery and she is in agreement  Darted on neoadjuvant anastrozole  · Patient experienced significant adverse effects with anastrozole  · Treatment changed to letrozole   · Axillary ultrasound 4/20/2021 -negative for any axillary lymphadenopathy  · Started letrozole in May 2021  · She continues on letrozole without any problems.  · 8/24/2021 bilateral diagnostic mammogram and ultrasound.  In the left breast the tumor now measures 1 cm on mammogram as opposed to 0.8 cm in March.  Right breast calcifications also noted to be in a large extent although the number seems to be decreased.  Hematoma has decreased in size.  · 10/26/2021-bilateral diagnostic mammogram and ultrasound-decrease in size of the bilateral breast lesions.  · 10/26/2021-PET/CT-metastatic lesions in the left adrenal gland, L1-L2.  · 10/26/2021-MRI of the brain-no evidence of metastatic disease  · 3/23/2022-bilateral diagnostic mammogram and ultrasound show complete resolution of the tumors.  · Continue letrozole.      *Adenocarcinoma of the left lung, metastatic   · Most likely stage T1 N2 M0, stage III  · MRI of the thoracic spine ordered to further evaluate the abnormality seen on PET  · MRI of the brain negative   · Given that  she is only 55 and fairly good performance status I discussed with her by with concurrent chemoradiation with cisplatin and Alimta every 3 weeks followed by durvalumab.  · Adverse effects including but not limited to myelosuppression, increased risk of infections, nausea, vomiting, renal dysfunction, ototoxicity, neurotoxicity have been discussed at length.  · MRI of the thoracic spine performed 5/24/2021 shows no evidence of metastatic disease in the thoracic spine however in L1 there is a 7 mm lesion which is indeterminate.  A lumbar spine MRI has been recommended.  · Discussed her case with Dr. Oates and he feels like MRI of the lumbar spine may also show that this lesion is indeterminate.  · Discussed the same with the patient and her .  · Even if this is metastatic disease this might be the only lesion of metastatic disease and then would consider this oligometastatic disease.  · Therefore plan to proceed with concurrent chemoradiation as scheduled.  · cisplatin and Alimta, C1D1 5/25/2021  · Day 1 of radiation 5/25/2021  · 5/27/2021-MRI of the lumbar spine-there is a 6 x 5 x 6 mm enhancing lesion in the left posterior body of L1 vertebra.  This is considered indeterminate.  Follow-up recommended.  Degenerative changes noted at L5-S1  · 7/7/2021, cycle 3 cisplatin Alimta  · 7/12/2021-radiation completed  · PET/CT 8/6/2021 with good response to chemoradiation.  · MRI of the lumbar spine 10/1/2021 with increase in size of the L1 lesion which now measures 14 mm as opposed to 6 mm in May 2021.  · 10/14/2021-biopsy of the L1 lesion and pathology consistent with adenocarcinoma of the lung, TPS 0  · 0/26/2021-PET/CT-metastatic lesions in the left adrenal gland, L1-L2.  · 10/26/2021-MRI of the brain-no evidence of metastatic disease  · Given that she only has metastatic disease in L1-L2 and left adrenal gland I think it would be possible to radiate all these regions.  · Completed radiation to L1-L2 on  11/19/2021  · 11/22/2021-cycle 1 of Keytruda and Alimta  · Completed radiation to the left adrenal gland  · 1/18/2022-PET/CT-images independently reviewed and interpreted by me.  Decrease in size of the 7 mm pulmonary nodule in the left upper lobe which previously measured 9 mm.  Radiation pneumonitis is nearly resolved.  Hypermetabolic activity at the L1-L2 metastasis has resolved.  No new suspicious metastatic disease.  · Guardant 360 circulating DNA level decreasing.  · 3/7/2022-patient is complaining of fluid retention.  · This is likely secondary to Alimta  · Continue Alimta, continue steroids and also on Lasix    *Fluid retention  · Started on Lasix at last visit  · Creatinine increased to 2.05  · Discontinue Lasix  · 1 L of normal saline today    *CHASITY  · Most likely secondary to Lasix  · Discontinue Lasix  · 1 L normal saline  · Recheck BMP in 1 week    *Anemia  · Hemoglobin 7.9  · Symptomatic  · 2 units of PRBC    *Bilateral breast cancer-no family history of breast cancer but given synchronous breast cancer genetic testing has been sent.  Genetic testing with a variant of unknown significance.    *Right hand numbness and difficulty with grasping objects  · Previously had history of carpal tunnel requiring repair.  · She is currently on letrozole which could cause worsening of the carpal tunnel  · Gabapentin dose will be increased to 600 mg 3 times daily  · She will also be referred to hand surgery for further evaluation.  · She now reports bilateral upper extremity numbness.  · Continue gabapentin  · MRI of the brain 10/26/2021 without any evidence of metastatic disease  · Not an issue today    *Anxiety-  · Currently on Xanax 1 mg nightly  · She required Valium for the MRI as she had claustrophobia and a panic attack  · She started Zyprexa about 2 weeks ago.  · Anxiety unchanged    *Left-sided chest wall pain-secondary to VATS.  Most likely neuropathic.  Continue gabapentin and Norco as needed.  She ran out  of gabapentin and experienced leg cramps.    Continue gabapentin  Improved      *Hypertension-continue lisinopril.  Blood pressure elevated at 155/102.  Recheck at the end of the visit    *Smoking-not addressed today    *GERD  · 7/7/2021, patient complains of reflux despite the use of Prilosec 20 mg twice daily.  We will send her a prescription for Carafate slurry 4 times daily.  · She has not quite started using the Carafate.  · Continue Prilosec and Carafate  · PET/CT shows duodenitis    *Port not returning blood    · A new Mediport was placed by thoracic surgery 11/29/2021, Eliquis has been discontinued and right chest Mediport is now safe to use.      PLAN:  1. Proceed today with Keytruda Alimta  2. Dose reduce Alimta by 15%  3. Continue steroids  4. Discontinue Lasix  5. 1 L normal saline  6. B12 injection  7. Continue PPI   8. BMP in 1 week and fluids if necessary  9. follow-up in 3 weeks with PET/CT      Patient is on treatment requiring close monitoring for toxicities.  40 minutes spent on the encounter including reviewing the breast imaging, face-to-face time and documentation on the same day.    Salma Snell MD

## 2022-03-28 NOTE — NURSING NOTE
Duncan and CMP reviewed with Dr. Snell. Ok to treat with alimta and keytruda today.  Pt to hold lasix completely and ibuprofen.  Pt to receive an extra 1000ml NS today. She will return next week for NP visit and recheck BMP.  Written info regarding the above given to pt. She and  v/u.  to scheduling to obtain new appt schedule.  Informed pharmacy ok to treat today.

## 2022-04-01 ENCOUNTER — HOSPITAL ENCOUNTER (OUTPATIENT)
Dept: INFUSION THERAPY | Facility: HOSPITAL | Age: 57
Setting detail: INFUSION SERIES
Discharge: HOME | End: 2022-04-01

## 2022-04-01 VITALS
SYSTOLIC BLOOD PRESSURE: 105 MMHG | TEMPERATURE: 98.2 F | OXYGEN SATURATION: 98 % | RESPIRATION RATE: 16 BRPM | HEART RATE: 69 BPM | DIASTOLIC BLOOD PRESSURE: 75 MMHG

## 2022-04-01 DIAGNOSIS — C34.12 PRIMARY ADENOCARCINOMA OF UPPER LOBE OF LEFT LUNG: Primary | ICD-10-CM

## 2022-04-01 DIAGNOSIS — C50.411 MALIGNANT NEOPLASM OF UPPER-OUTER QUADRANT OF RIGHT BREAST IN FEMALE, ESTROGEN RECEPTOR POSITIVE: ICD-10-CM

## 2022-04-01 DIAGNOSIS — Z45.2 ENCOUNTER FOR FITTING AND ADJUSTMENT OF VASCULAR CATHETER: ICD-10-CM

## 2022-04-01 DIAGNOSIS — Z17.0 MALIGNANT NEOPLASM OF UPPER-OUTER QUADRANT OF RIGHT BREAST IN FEMALE, ESTROGEN RECEPTOR POSITIVE: ICD-10-CM

## 2022-04-01 DIAGNOSIS — C34.90 NON-SMALL CELL LUNG CANCER METASTATIC TO BONE: ICD-10-CM

## 2022-04-01 DIAGNOSIS — C79.51 NON-SMALL CELL LUNG CANCER METASTATIC TO BONE: ICD-10-CM

## 2022-04-01 LAB
ABO GROUP BLD: NORMAL
BLD GP AB SCN SERPL QL: NEGATIVE
FERRITIN SERPL-MCNC: 320 NG/ML (ref 13–150)
FOLATE SERPL-MCNC: >20 NG/ML (ref 4.78–24.2)
IRON 24H UR-MRATE: 133 MCG/DL (ref 37–145)
IRON SATN MFR SERPL: 34 % (ref 20–50)
RH BLD: POSITIVE
T&S EXPIRATION DATE: NORMAL
TIBC SERPL-MCNC: 392 MCG/DL (ref 298–536)
TRANSFERRIN SERPL-MCNC: 263 MG/DL (ref 200–360)
VIT B12 BLD-MCNC: 409 PG/ML (ref 211–946)

## 2022-04-01 PROCEDURE — 63710000001 DIPHENHYDRAMINE PER 50 MG: Performed by: INTERNAL MEDICINE

## 2022-04-01 PROCEDURE — P9016 RBC LEUKOCYTES REDUCED: HCPCS

## 2022-04-01 PROCEDURE — 86901 BLOOD TYPING SEROLOGIC RH(D): CPT | Performed by: INTERNAL MEDICINE

## 2022-04-01 PROCEDURE — 36415 COLL VENOUS BLD VENIPUNCTURE: CPT

## 2022-04-01 PROCEDURE — 86900 BLOOD TYPING SEROLOGIC ABO: CPT

## 2022-04-01 PROCEDURE — 82746 ASSAY OF FOLIC ACID SERUM: CPT | Performed by: INTERNAL MEDICINE

## 2022-04-01 PROCEDURE — 36430 TRANSFUSION BLD/BLD COMPNT: CPT

## 2022-04-01 PROCEDURE — 86850 RBC ANTIBODY SCREEN: CPT | Performed by: INTERNAL MEDICINE

## 2022-04-01 PROCEDURE — 84466 ASSAY OF TRANSFERRIN: CPT | Performed by: INTERNAL MEDICINE

## 2022-04-01 PROCEDURE — 25010000002 HEPARIN LOCK FLUSH PER 10 UNITS: Performed by: INTERNAL MEDICINE

## 2022-04-01 PROCEDURE — 83540 ASSAY OF IRON: CPT | Performed by: INTERNAL MEDICINE

## 2022-04-01 PROCEDURE — 86900 BLOOD TYPING SEROLOGIC ABO: CPT | Performed by: INTERNAL MEDICINE

## 2022-04-01 PROCEDURE — 82607 VITAMIN B-12: CPT | Performed by: INTERNAL MEDICINE

## 2022-04-01 PROCEDURE — 86923 COMPATIBILITY TEST ELECTRIC: CPT

## 2022-04-01 PROCEDURE — 82728 ASSAY OF FERRITIN: CPT | Performed by: INTERNAL MEDICINE

## 2022-04-01 RX ORDER — HEPARIN SODIUM (PORCINE) LOCK FLUSH IV SOLN 100 UNIT/ML 100 UNIT/ML
500 SOLUTION INTRAVENOUS AS NEEDED
Status: CANCELLED | OUTPATIENT
Start: 2022-04-01

## 2022-04-01 RX ORDER — HEPARIN SODIUM (PORCINE) LOCK FLUSH IV SOLN 100 UNIT/ML 100 UNIT/ML
500 SOLUTION INTRAVENOUS AS NEEDED
Status: DISCONTINUED | OUTPATIENT
Start: 2022-04-01 | End: 2022-04-03 | Stop reason: HOSPADM

## 2022-04-01 RX ORDER — DIPHENHYDRAMINE HCL 25 MG
25 CAPSULE ORAL ONCE
Status: COMPLETED | OUTPATIENT
Start: 2022-04-01 | End: 2022-04-01

## 2022-04-01 RX ORDER — SODIUM CHLORIDE 9 MG/ML
250 INJECTION, SOLUTION INTRAVENOUS AS NEEDED
Status: DISCONTINUED | OUTPATIENT
Start: 2022-04-01 | End: 2022-04-03 | Stop reason: HOSPADM

## 2022-04-01 RX ORDER — ACETAMINOPHEN 325 MG/1
650 TABLET ORAL ONCE
Status: COMPLETED | OUTPATIENT
Start: 2022-04-01 | End: 2022-04-01

## 2022-04-01 RX ORDER — SODIUM CHLORIDE 0.9 % (FLUSH) 0.9 %
10 SYRINGE (ML) INJECTION AS NEEDED
Status: CANCELLED | OUTPATIENT
Start: 2022-04-01

## 2022-04-01 RX ADMIN — ACETAMINOPHEN 650 MG: 325 TABLET ORAL at 09:31

## 2022-04-01 RX ADMIN — Medication 500 UNITS: at 12:30

## 2022-04-01 RX ADMIN — DIPHENHYDRAMINE HYDROCHLORIDE 25 MG: 25 CAPSULE ORAL at 09:31

## 2022-04-02 LAB
BH BB BLOOD EXPIRATION DATE: NORMAL
BH BB BLOOD TYPE BARCODE: 5100
BH BB DISPENSE STATUS: NORMAL
BH BB PRODUCT CODE: NORMAL
BH BB UNIT NUMBER: NORMAL
CROSSMATCH INTERPRETATION: NORMAL
UNIT  ABO: NORMAL
UNIT  RH: NORMAL

## 2022-04-04 ENCOUNTER — APPOINTMENT (OUTPATIENT)
Dept: ONCOLOGY | Facility: HOSPITAL | Age: 57
End: 2022-04-04

## 2022-04-04 ENCOUNTER — LAB (OUTPATIENT)
Dept: LAB | Facility: HOSPITAL | Age: 57
End: 2022-04-04

## 2022-04-04 ENCOUNTER — OFFICE VISIT (OUTPATIENT)
Dept: ONCOLOGY | Facility: CLINIC | Age: 57
End: 2022-04-04

## 2022-04-04 VITALS
HEIGHT: 63 IN | SYSTOLIC BLOOD PRESSURE: 98 MMHG | HEART RATE: 87 BPM | RESPIRATION RATE: 16 BRPM | WEIGHT: 169.8 LBS | BODY MASS INDEX: 30.09 KG/M2 | OXYGEN SATURATION: 95 % | TEMPERATURE: 96.2 F | DIASTOLIC BLOOD PRESSURE: 66 MMHG

## 2022-04-04 DIAGNOSIS — R79.89 ELEVATED SERUM CREATININE: ICD-10-CM

## 2022-04-04 DIAGNOSIS — Z17.0 MALIGNANT NEOPLASM OF UPPER-OUTER QUADRANT OF RIGHT BREAST IN FEMALE, ESTROGEN RECEPTOR POSITIVE: ICD-10-CM

## 2022-04-04 DIAGNOSIS — C34.90 NON-SMALL CELL LUNG CANCER METASTATIC TO BONE: ICD-10-CM

## 2022-04-04 DIAGNOSIS — C79.51 NON-SMALL CELL LUNG CANCER METASTATIC TO BONE: ICD-10-CM

## 2022-04-04 DIAGNOSIS — C34.12 PRIMARY ADENOCARCINOMA OF UPPER LOBE OF LEFT LUNG: ICD-10-CM

## 2022-04-04 DIAGNOSIS — C50.411 MALIGNANT NEOPLASM OF UPPER-OUTER QUADRANT OF RIGHT BREAST IN FEMALE, ESTROGEN RECEPTOR POSITIVE: ICD-10-CM

## 2022-04-04 DIAGNOSIS — C34.12 PRIMARY ADENOCARCINOMA OF UPPER LOBE OF LEFT LUNG: Primary | ICD-10-CM

## 2022-04-04 LAB
ANION GAP SERPL CALCULATED.3IONS-SCNC: 10.3 MMOL/L (ref 5–15)
BASOPHILS # BLD AUTO: 0.04 10*3/MM3 (ref 0–0.2)
BASOPHILS NFR BLD AUTO: 0.6 % (ref 0–1.5)
BUN SERPL-MCNC: 23 MG/DL (ref 6–20)
BUN/CREAT SERPL: 13.6 (ref 7.3–30)
CALCIUM SPEC-SCNC: 9.5 MG/DL (ref 8.5–10.2)
CHLORIDE SERPL-SCNC: 105 MMOL/L (ref 98–107)
CO2 SERPL-SCNC: 24.7 MMOL/L (ref 22–29)
CREAT SERPL-MCNC: 1.69 MG/DL (ref 0.6–1.1)
DEPRECATED RDW RBC AUTO: 66.4 FL (ref 37–54)
EGFRCR SERPLBLD CKD-EPI 2021: 34.9 ML/MIN/1.73
EOSINOPHIL # BLD AUTO: 0.21 10*3/MM3 (ref 0–0.4)
EOSINOPHIL NFR BLD AUTO: 3.3 % (ref 0.3–6.2)
ERYTHROCYTE [DISTWIDTH] IN BLOOD BY AUTOMATED COUNT: 18.3 % (ref 12.3–15.4)
GLUCOSE SERPL-MCNC: 106 MG/DL (ref 74–124)
HCT VFR BLD AUTO: 31.2 % (ref 34–46.6)
HGB BLD-MCNC: 9.8 G/DL (ref 12–15.9)
IMM GRANULOCYTES # BLD AUTO: 0.03 10*3/MM3 (ref 0–0.05)
IMM GRANULOCYTES NFR BLD AUTO: 0.5 % (ref 0–0.5)
LYMPHOCYTES # BLD AUTO: 0.46 10*3/MM3 (ref 0.7–3.1)
LYMPHOCYTES NFR BLD AUTO: 7.2 % (ref 19.6–45.3)
MCH RBC QN AUTO: 31.4 PG (ref 26.6–33)
MCHC RBC AUTO-ENTMCNC: 31.4 G/DL (ref 31.5–35.7)
MCV RBC AUTO: 100 FL (ref 79–97)
MONOCYTES # BLD AUTO: 0.17 10*3/MM3 (ref 0.1–0.9)
MONOCYTES NFR BLD AUTO: 2.7 % (ref 5–12)
NEUTROPHILS NFR BLD AUTO: 5.47 10*3/MM3 (ref 1.7–7)
NEUTROPHILS NFR BLD AUTO: 85.7 % (ref 42.7–76)
NRBC BLD AUTO-RTO: 0 /100 WBC (ref 0–0.2)
PLATELET # BLD AUTO: 170 10*3/MM3 (ref 140–450)
PMV BLD AUTO: 8.7 FL (ref 6–12)
POTASSIUM SERPL-SCNC: 4.2 MMOL/L (ref 3.5–4.7)
RBC # BLD AUTO: 3.12 10*6/MM3 (ref 3.77–5.28)
SODIUM SERPL-SCNC: 140 MMOL/L (ref 134–145)
WBC NRBC COR # BLD: 6.38 10*3/MM3 (ref 3.4–10.8)

## 2022-04-04 PROCEDURE — 80048 BASIC METABOLIC PNL TOTAL CA: CPT

## 2022-04-04 PROCEDURE — 99214 OFFICE O/P EST MOD 30 MIN: CPT

## 2022-04-04 PROCEDURE — 36415 COLL VENOUS BLD VENIPUNCTURE: CPT

## 2022-04-04 PROCEDURE — 85025 COMPLETE CBC W/AUTO DIFF WBC: CPT

## 2022-04-04 RX ORDER — FOLIC ACID 1 MG/1
1 TABLET ORAL DAILY
Qty: 30 TABLET | Refills: 3 | Status: SHIPPED | OUTPATIENT
Start: 2022-04-04 | End: 2022-09-16

## 2022-04-04 NOTE — PROGRESS NOTES
Subjective   Holli Patel is a 56 y.o. female.  Referred by Dr. 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    3/1/2021-bilateral screening mammogram-microcalcifications seen in the posterior one third of the lateral aspect of the right breast.  Area of focal asymmetry seen in the middle one third of the upper inner quadrant of the left breast.    3/1/2021-DEXA scan-T score of -2.2 on the lumbar spine and T score of -2.3 in the left hip and T score of -1.9 in the right hip.  Findings consistent with osteopenia    3/23/2021-screening lung CT-there is a 10 x 11 mm solid nodule in the left upper lobe.  Enlarged AP window lymph node measuring 14 x 10 mm.  Suggestion of a possible 9 mm left hilar lymph node.  Heavy coronary artery calcification.    3/23/2021-diagnostic mammogram bilateral-cluster of microcalcifications in the middle third lateral aspect of the right breast.  Left breast demonstrates persistence of the area of focal asymmetry in the region.    Ultrasound-left breast ultrasound at 10 o'clock position, 6 cm from the nipple there is a 0.4 cm irregular hypoechoic lesion.  Stereotactic biopsy of the right breast calcifications recommended.  Ultrasound-guided biopsy of the left breast lesion recommended    4/7/2021-right breast stereotactic biopsy and left breast ultrasound-guided biopsy  Pathology  Right breast-invasive ductal carcinoma, grade 2, lymphovascular invasion present, ER +99% strong, MO +80% moderate, HER-2 negative, Ki-67 12%  Left breast upper inner quadrant 10 o'clock position biopsy, invasive ductal carcinoma, grade   2, ER +99% strong, MO +40% strong, HER-2 2+ on immunohistochemistry, nonamplified on FISH Ki-67 10%    4/14/2021-PET/CT-FDG avid aortopulmonary window lymph node measures 0.9 cm with an SUV of 7.5.  FDG avid left hilar lymph node  measures 1 cm with an SUV of 17.2.  Irregular soft tissue density in the right breast measuring 3.7 x 3.8 cm is favored to be secondary to the recent breast biopsy.  1.1 cm pulmonary nodule within the left upper lobe with SUV 9.7 .  Sub-6 mm pulmonary nodules are present in the right lower lobe.  No evidence of lymphadenopathy or metastatic disease in the abdomen.  Focal area of FDG uptake within the central canal posterior to T11-T12 displaced demonstrating an SUV of 5.5 thought to be reactive however MRI is recommended for further evaluation.    She was seen by Dr. Molina who initially planned for breast surgery however  due to the PET abnormalities she has been referred to Dr. Kerr who plans to do a wound on 4/30/2021.  Dr. Molina's and Dr. Kerr's notes reviewed.    Patient denies any family history of breast cancer.  Her mother had lymphoma.  Maternal grandmother had colon cancer.  Prior to that screening mammogram she did not have any palpable abnormalities of either breast.    She has been a heavy smoker for about 40 years and smoked 2 packs/day.  Denies any recent weight changes, new bone pains, cough, abdominal pain nausea vomiting constipation or diarrhea.  She does have anxiety and has been on several medications.  She has recently been started on Xanax to help with the mood and also with insomnia.    Patient had a video-assisted thoracoscopy and mediastinal lymphadenectomy on 4/30/2021.  L5-L6 lymph nodes were biopsied however the small pulmonary nodule in the left upper lobe was not difficult to find.  Pathology showed moderately differentiated adenocarcinoma which is CK7 and TTF-1 positive.  Consistent with lung primary.  Ki-67 85%.    5/14/2021-MRI of the brain-minimal chronic small vessel ischemic change in the white matter.  Otherwise negative MRI.    5/20/2021-bilateral axillary ultrasound-no evidence of axillary lymphadenopathy in either axilla.  Normal-appearing bilateral axillary lymph  nodes visualized.    Cycle 1 cisplatin and Alimta on 5/25/2021.    Cycle 2 cisplatin Alimta 6/15/2021    Cycle 3 cisplatin Alimta 7/7/2021    Completed radiation on 7/12/2021    Port was nonfunctional hence a port study was performed which showed that the port was backed up into the innominate vein and also there was a nonocclusive thrombus at the end of the catheter extending into the superior vena cava.  She was started on anticoagulation with Eliquis.  It was recommended for port revision of the intention is to keep the port.    PET/CT 8/5/2021-images independently reviewed and interpreted by me-decrease in size and FDG uptake of the left upper lobe pulmonary nodule as well as mediastinal and left hilar lymphadenopathy representing response to treatment.  Sub-6 mm pulmonary nodule in the left upper lobe new since 4/14/2021.  This could be related to radiation however follow-up CT in 3 months recommended.  Decrease in size of the irregular masslike tissue in the right breast which is postbiopsy hematoma.  This is not PET avid.  New segmental left eighth rib fractures healing.  A new band of sclerosis of the left seventh rib which favored to represent healing nondisplaced fracture.  Follow-up CT recommended.  focal uptake in the duodenum first and second portions.  Could be related to duodenitis.  Indeterminate lesion in the L1 vertebral body not well evaluated on PET/CT.    10/1/2021-MRI of the lumbar spine.  Lesion in the left posterior body of L1 measures 14 x 14 x 12 mm and previously 5 x 5 x 6 mm.  The interval enlargement strongly suggest that it is metastatic lesion.  No additional osseous metastasis noted.   Other chronic changes noted.    10/14/2021-biopsy of the lumbar spine lesion-pathology consistent with poorly differentiated carcinoma.  The staining is similar to the prior tumors and fevers this being metastatic poorly differentiated pulmonary adenocarcinoma.    10/26/2021-brain MRI-no evidence of  metastatic disease.    10/26/2021-bilateral diagnostic mammogram and ultrasound  Scattered fibroglandular density in both breast.  Postbiopsy hematoma with internal biopsy clip in the upper outer quadrant of the right breast, 8 cm from the nipple.  The hematoma size is decreased to 2.9 cm from 3.6 cm earlier.    Left breast-parenchymal density measuring 9 x 10 mm has markedly decreased.  Few adjacent calcifications which are unchanged.  No new abnormality in the left breast.  At 10:00 region there is some minimal adjacent hypoechoic texture which is difficult to measure but appears smaller since the previous exam.    10/28/2021-PET/CT  New L1 and L2 lytic bone metastasis annually intensely hypermetabolic focus at the left adrenal gland likely represents metastatic disease as well.  Slight decrease in size of the 0.9 x 0.7 cm left upper lobe pulmonary nodule.  There is hypermetabolic activity related to radiation pneumonitis.  The remainder of the nodule is photopenic.  Uncertain etiology of the more intense activity along the right lateral peripheral margin of the 2.6 cm postbiopsy density of the right breast.    She completed radiation to to the lumbar spine on 11/19/2021 11/22/2021-cycle 1 Alimta and Keytruda    3/23/2022-bilateral diagnostic mammogram and ultrasound  Complete resolution of the microcalcifications in the upper outer quadrant of the right breast and decrease in size of the postbiopsy hematoma.  No suspicious abnormalities in the right breast.  Benign-appearing calcifications at the site of malignancy in the left breast upper inner quadrant.  No other suspicious findings.    Interval history:  Holli Patel is a 56-year-old female with the above-mentioned history returns to the office today for short interval follow up of an acute kidney injury.  She is feeling well today. Overall, fluid retention has resolved including periorbital edema. Lasix discontinued due to decreased kidney function.  She does continue to have some fatigue. No new concerns today.    The following portions of the patient's history were reviewed and updated as appropriate: allergies, current medications, past family history, past medical history, past social history, past surgical history and problem list.    Past Medical History:   Diagnosis Date   • Anxiety    • Clot     POSSIBLE BLOOD CLOT IN VENOUS ACCESS DEVICE-PT STATES WAS STARTED ON ELIQUIS    • Dyspnea on exertion    • Elevated cholesterol    • Frequent urination     DAY AND NIGHT   • SHAYY (generalized anxiety disorder)     RELATED HIGH BLOOD PRESSURE   • GERD (gastroesophageal reflux disease)    • High blood pressure     ANXIETY RELATED   • Hyperlipidemia    • Hypothyroidism    • Lung cancer (HCC)    • Lung nodule     LEFT   • Malignant neoplasm of upper-outer quadrant of right breast in female, estrogen receptor positive (HCC) 4/13/2021    PT STATES HAS BILAT BREAST CANCER   • Migraine    • PONV (postoperative nausea and vomiting)         Past Surgical History:   Procedure Laterality Date   • BREAST BIOPSY Bilateral 04/07/2021    Right Breast 10 o'clock & Left breast 10 o'clock 6 cm from nipple, [facility]   • CLOSED REDUCTION HIP DISLOCATION Left 4/30/2021    Procedure: BRONCHOSCOPY, LEFT VIDEO ASSITED THORACOSCOPY, ROBOTIC ASSSITED MEDIASTINAL LYMPHADENECTOMY WITH INTERCOSTAL NERVE BLOCKS;  Surgeon: Raphael Kerr III, MD;  Location: [facility];  Service: DaVinci;  Laterality: Left;   • COLONOSCOPY     • TUBAL ABDOMINAL LIGATION     • VENOUS ACCESS DEVICE (PORT) INSERTION Right 5/20/2021    Procedure: INSERTION VENOUS ACCESS DEVICE;  Surgeon: Raphael Kerr III, MD;  Location: [facility] OR;  Service: Thoracic;  Laterality: Right;   • VENOUS ACCESS DEVICE (PORT) INSERTION Right 11/29/2021    Procedure: REVISION AND REPLACEMENT RIGHT SUBCLAVIAN VENOUS ACCESS PORT;  Surgeon: Raphael Kerr III, MD;  Location: [facility] OR;  Service: Thoracic;  Laterality: Right;    • WRIST SURGERY Right         Family History   Problem Relation Age of Onset   • Cancer Father         LYMPHATIC   • Prostate cancer Father    • Lymphoma Father    • Alcohol abuse Brother    • Colon cancer Maternal Grandmother    • Malig Hyperthermia Neg Hx         Social History     Socioeconomic History   • Marital status:      Spouse name: Jelani   Tobacco Use   • Smoking status: Former Smoker     Packs/day: 1.00     Years: 30.00     Pack years: 30.00     Types: Electronic Cigarette, Cigarettes     Start date: 1981     Quit date:      Years since quittin.2   • Smokeless tobacco: Never Used   • Tobacco comment: ABT 15 CIGARETTES DAILY   Vaping Use   • Vaping Use: Some days   • Substances: Nicotine, Flavoring   • Devices: Disposable   Substance and Sexual Activity   • Alcohol use: Never   • Drug use: Never   • Sexual activity: Yes     Partners: Male        OB History        2    Para   2    Term   2            AB        Living           SAB        IAB        Ectopic        Molar        Multiple        Live Births                Age of menarche-10  Age at menopause-42  Oral contraceptive pill use-15 years  No HRT use  She has 2 children  Age at first live childbirth-17    No Known Allergies     Review of Systems   Review of systems as mentioned in the HPI    Objective   not currently breastfeeding.       Physical Exam  Vitals reviewed.   HENT:      Head: Normocephalic.   Eyes:      Pupils: Pupils are equal, round, and reactive to light.   Cardiovascular:      Rate and Rhythm: Normal rate and regular rhythm.      Pulses: Normal pulses.      Heart sounds: Normal heart sounds.   Pulmonary:      Effort: Pulmonary effort is normal.      Breath sounds: Normal breath sounds.   Abdominal:      General: Abdomen is flat.   Musculoskeletal:         General: No swelling. Normal range of motion.      Cervical back: Normal range of motion.   Skin:     General: Skin is warm.   Neurological:       General: No focal deficit present.      Mental Status: She is alert and oriented to person, place, and time.   Psychiatric:         Mood and Affect: Mood normal.         Behavior: Behavior normal.     Breast Exam: Right breast with a large postbiopsy hematoma in the upper outer quadrant measuring 3x2 cm.    No other palpable abnormalities.  Left breast with no palpable abnormalities.  No right or left axillary lymphadenopathy(breast not examined today, 4/4/2022)         Results from last 7 days   Lab Units 04/01/22  0930   FERRITIN ng/mL 320.00*   IRON mcg/dL 133   TIBC mcg/dL 392           US Breast Left Limited    Result Date: 3/24/2022  1. There has been interval resolution of all microcalcifications from the site of biopsy-proven malignancy in the upper-outer quadrant of the right breast. Slight decrease in size of the postbiopsy hematoma and/or area of evolving fat necrosis at the site of biopsy-proven malignancy is noted. No new suspicious findings in the right breast are visualized. Mammographically, the imaging features suggest imaging complete response to neoadjuvant chemotherapy. Continued clinical and imaging follow-up as clinically indicated is recommended. 2. There are benign-appearing calcifications in association with the biopsy clip at the site of biopsy-proven malignancy in the upper inner quadrant of the left breast. No evidence for a residual portion of the malignancy at the site of biopsy-proven malignancy is appreciated. No additional or new suspicious findings in the left breast are appreciated. A nonspecific 0.2 cm area of focal asymmetry/increased density is seen posterior to the biopsy clip in the upper inner quadrant of the left breast. Mammographically and sonographically the imaging features suggest near-complete response to medical therapy. Clinical follow-up and imaging follow-up as clinically indicated is recommended.  BI-RADS Category 6: Known malignancy.  This report was finalized on  3/24/2022 2:46 PM by Dr. Jeffrey Quevedo M.D.      Mammo Diagnostic Digital Tomosynthesis Bilateral With CAD    Result Date: 3/24/2022  1. There has been interval resolution of all microcalcifications from the site of biopsy-proven malignancy in the upper-outer quadrant of the right breast. Slight decrease in size of the postbiopsy hematoma and/or area of evolving fat necrosis at the site of biopsy-proven malignancy is noted. No new suspicious findings in the right breast are visualized. Mammographically, the imaging features suggest imaging complete response to neoadjuvant chemotherapy. Continued clinical and imaging follow-up as clinically indicated is recommended. 2. There are benign-appearing calcifications in association with the biopsy clip at the site of biopsy-proven malignancy in the upper inner quadrant of the left breast. No evidence for a residual portion of the malignancy at the site of biopsy-proven malignancy is appreciated. No additional or new suspicious findings in the left breast are appreciated. A nonspecific 0.2 cm area of focal asymmetry/increased density is seen posterior to the biopsy clip in the upper inner quadrant of the left breast. Mammographically and sonographically the imaging features suggest near-complete response to medical therapy. Clinical follow-up and imaging follow-up as clinically indicated is recommended.  BI-RADS Category 6: Known malignancy.  This report was finalized on 3/24/2022 2:46 PM by Dr. Jeffrey Quevedo M.D.      1/24/2022 CBC reviewed and stable WBC of 7.5, hemoglobin 9.6 and platelets 354,000  CMP reviewed and potassium 3.3 but otherwise within normal limits.    Bilateral diagnostic mammogram and ultrasound reviewed and summarized in HPI    Assessment/Plan      *Bilateral breast cancer  · Invasive ductal carcinoma in both right and left breast and lesions measure under 1 cm.  No palpable lymphadenopathy although unsure if this has been evaluated by an axillary  ultrasound.  No abnormal axillary lymphadenopathy noted on PET.  · Most likely clinical T1b N0 M0, both cancers were noted to be grade 2, ER/KS positive and HER-2 negative and Ki-67 less than 15%  VATS on 4/30/2021  Biopsy confirms adenocarcinoma of pulmonary primary  Discussed with Dr. Molina about delaying breast surgery and she is in agreement  Darted on neoadjuvant anastrozole  · Patient experienced significant adverse effects with anastrozole  · Treatment changed to letrozole   · Axillary ultrasound 4/20/2021 -negative for any axillary lymphadenopathy  · Started letrozole in May 2021  · She continues on letrozole without any problems.  · 8/24/2021 bilateral diagnostic mammogram and ultrasound.  In the left breast the tumor now measures 1 cm on mammogram as opposed to 0.8 cm in March.  Right breast calcifications also noted to be in a large extent although the number seems to be decreased.  Hematoma has decreased in size.  · 10/26/2021-bilateral diagnostic mammogram and ultrasound-decrease in size of the bilateral breast lesions.  · 10/26/2021-PET/CT-metastatic lesions in the left adrenal gland, L1-L2.  · 10/26/2021-MRI of the brain-no evidence of metastatic disease  · 3/23/2022-bilateral diagnostic mammogram and ultrasound show complete resolution of the tumors.  · Continue letrozole.    *Adenocarcinoma of the left lung, metastatic   · Most likely stage T1 N2 M0, stage III  · MRI of the thoracic spine ordered to further evaluate the abnormality seen on PET  · MRI of the brain negative   · Given that she is only 55 and fairly good performance status I discussed with her by with concurrent chemoradiation with cisplatin and Alimta every 3 weeks followed by durvalumab.  · Adverse effects including but not limited to myelosuppression, increased risk of infections, nausea, vomiting, renal dysfunction, ototoxicity, neurotoxicity have been discussed at length.  · MRI of the thoracic spine performed 5/24/2021 shows no  evidence of metastatic disease in the thoracic spine however in L1 there is a 7 mm lesion which is indeterminate.  A lumbar spine MRI has been recommended.  · Discussed her case with Dr. Oates and he feels like MRI of the lumbar spine may also show that this lesion is indeterminate.  · Discussed the same with the patient and her .  · Even if this is metastatic disease this might be the only lesion of metastatic disease and then would consider this oligometastatic disease.  · Therefore plan to proceed with concurrent chemoradiation as scheduled.  · cisplatin and Alimta, C1D1 5/25/2021  · Day 1 of radiation 5/25/2021  · 5/27/2021-MRI of the lumbar spine-there is a 6 x 5 x 6 mm enhancing lesion in the left posterior body of L1 vertebra.  This is considered indeterminate.  Follow-up recommended.  Degenerative changes noted at L5-S1  · 7/7/2021, cycle 3 cisplatin Alimta  · 7/12/2021-radiation completed  · PET/CT 8/6/2021 with good response to chemoradiation.  · MRI of the lumbar spine 10/1/2021 with increase in size of the L1 lesion which now measures 14 mm as opposed to 6 mm in May 2021.  · 10/14/2021-biopsy of the L1 lesion and pathology consistent with adenocarcinoma of the lung, TPS 0  · 0/26/2021-PET/CT-metastatic lesions in the left adrenal gland, L1-L2.  · 10/26/2021-MRI of the brain-no evidence of metastatic disease  · Given that she only has metastatic disease in L1-L2 and left adrenal gland I think it would be possible to radiate all these regions.  · Completed radiation to L1-L2 on 11/19/2021  · 11/22/2021-cycle 1 of Keytruda and Alimta  · Completed radiation to the left adrenal gland  · 1/18/2022-PET/CT-images independently reviewed and interpreted by me.  Decrease in size of the 7 mm pulmonary nodule in the left upper lobe which previously measured 9 mm.  Radiation pneumonitis is nearly resolved.  Hypermetabolic activity at the L1-L2 metastasis has resolved.  No new suspicious metastatic  disease.  · Guardant 360 circulating DNA level decreasing.  · 3/7/2022-patient is complaining of fluid retention.  · This is likely secondary to Alimta  · Continue Alimta, continue steroids and also on Lasix  · Lasix discontinued    *Fluid retention  · Started on Lasix at last visit  · Creatinine increased to 2.05  · Discontinue Lasix  · 4/4/2022 creatinine improved to 1.69    *CHASITY  · Most likely secondary to Lasix  · Discontinue Lasix  · 1 L normal saline 3/28/2022  · Kidney function improved, Creatinine 1.69 and BUN 23.  Encourage patient to increase oral intake.  Discouraged intake of caffeinated beverages.  Patient to return in 1 week for labs only around the time of her schedule PET scan appointment.    *Anemia  · Hemoglobin 7.9   · Symptomatic  · 2 units of PRBC  · Hgb improved to 9.8 today     *Bilateral breast cancer-no family history of breast cancer but given synchronous breast cancer genetic testing has been sent.  Genetic testing with a variant of unknown significance.    *Right hand numbness and difficulty with grasping objects  · Previously had history of carpal tunnel requiring repair.  · She is currently on letrozole which could cause worsening of the carpal tunnel  · Gabapentin dose will be increased to 600 mg 3 times daily  · She will also be referred to hand surgery for further evaluation.  · She now reports bilateral upper extremity numbness.  · Continue gabapentin  · MRI of the brain 10/26/2021 without any evidence of metastatic disease  · Not an issue today    *Anxiety-  · Currently on Xanax 1 mg nightly  · She required Valium for the MRI as she had claustrophobia and a panic attack  · She started Zyprexa about 2 weeks ago.  · Anxiety unchanged    *Left-sided chest wall pain-secondary to VATS.  Most likely neuropathic.  Continue gabapentin and Norco as needed.  She ran out of gabapentin and experienced leg cramps.    Continue gabapentin  Improved      *Hypertension-continue lisinopril.  Blood  pressure elevated at 155/102.  Recheck at the end of the visit    *Smoking-not addressed today    *GERD  · 7/7/2021, patient complains of reflux despite the use of Prilosec 20 mg twice daily.  We will send her a prescription for Carafate slurry 4 times daily.  · She has not quite started using the Carafate.  · Continue Prilosec and Carafate  · PET/CT shows duodenitis    *Port not returning blood    · A new Mediport was placed by thoracic surgery 11/29/2021, Eliquis has been discontinued and right chest Mediport is now safe to use.      PLAN:  1. Continue to encourage increased oral intake, patient would not like to stay for IV fluids today  2. In one week lab only appointment for cbc, bmp  3. Continue PPI   4. In one week PET scans  5. follow-up in 2 weeks with PET/CT      KORI Breaux

## 2022-04-11 ENCOUNTER — INFUSION (OUTPATIENT)
Dept: ONCOLOGY | Facility: HOSPITAL | Age: 57
End: 2022-04-11

## 2022-04-11 ENCOUNTER — HOSPITAL ENCOUNTER (OUTPATIENT)
Dept: PET IMAGING | Facility: HOSPITAL | Age: 57
Discharge: HOME | End: 2022-04-11

## 2022-04-11 DIAGNOSIS — C34.12 PRIMARY ADENOCARCINOMA OF UPPER LOBE OF LEFT LUNG: ICD-10-CM

## 2022-04-11 DIAGNOSIS — C34.90 NON-SMALL CELL LUNG CANCER METASTATIC TO BONE: ICD-10-CM

## 2022-04-11 DIAGNOSIS — R59.0 MEDIASTINAL ADENOPATHY: ICD-10-CM

## 2022-04-11 DIAGNOSIS — Z17.0 MALIGNANT NEOPLASM OF UPPER-OUTER QUADRANT OF RIGHT BREAST IN FEMALE, ESTROGEN RECEPTOR POSITIVE: ICD-10-CM

## 2022-04-11 DIAGNOSIS — C50.411 MALIGNANT NEOPLASM OF UPPER-OUTER QUADRANT OF RIGHT BREAST IN FEMALE, ESTROGEN RECEPTOR POSITIVE: ICD-10-CM

## 2022-04-11 DIAGNOSIS — R79.89 ELEVATED SERUM CREATININE: ICD-10-CM

## 2022-04-11 DIAGNOSIS — C79.51 NON-SMALL CELL LUNG CANCER METASTATIC TO BONE: ICD-10-CM

## 2022-04-11 LAB
ANION GAP SERPL CALCULATED.3IONS-SCNC: 9.9 MMOL/L (ref 5–15)
BASOPHILS # BLD AUTO: 0.01 10*3/MM3 (ref 0–0.2)
BASOPHILS NFR BLD AUTO: 0.3 % (ref 0–1.5)
BUN SERPL-MCNC: 17 MG/DL (ref 6–20)
BUN/CREAT SERPL: 12.1 (ref 7.3–30)
CALCIUM SPEC-SCNC: 8.7 MG/DL (ref 8.5–10.2)
CHLORIDE SERPL-SCNC: 105 MMOL/L (ref 98–107)
CO2 SERPL-SCNC: 25.1 MMOL/L (ref 22–29)
CREAT SERPL-MCNC: 1.4 MG/DL (ref 0.6–1.1)
DEPRECATED RDW RBC AUTO: 62 FL (ref 37–54)
EGFRCR SERPLBLD CKD-EPI 2021: 43.7 ML/MIN/1.73
EOSINOPHIL # BLD AUTO: 0.34 10*3/MM3 (ref 0–0.4)
EOSINOPHIL NFR BLD AUTO: 8.7 % (ref 0.3–6.2)
ERYTHROCYTE [DISTWIDTH] IN BLOOD BY AUTOMATED COUNT: 17.3 % (ref 12.3–15.4)
GLUCOSE BLDC GLUCOMTR-MCNC: 94 MG/DL (ref 70–130)
GLUCOSE SERPL-MCNC: 103 MG/DL (ref 74–124)
HCT VFR BLD AUTO: 25.4 % (ref 34–46.6)
HGB BLD-MCNC: 8.2 G/DL (ref 12–15.9)
IMM GRANULOCYTES # BLD AUTO: 0.02 10*3/MM3 (ref 0–0.05)
IMM GRANULOCYTES NFR BLD AUTO: 0.5 % (ref 0–0.5)
LYMPHOCYTES # BLD AUTO: 0.85 10*3/MM3 (ref 0.7–3.1)
LYMPHOCYTES NFR BLD AUTO: 21.7 % (ref 19.6–45.3)
MCH RBC QN AUTO: 32.2 PG (ref 26.6–33)
MCHC RBC AUTO-ENTMCNC: 32.3 G/DL (ref 31.5–35.7)
MCV RBC AUTO: 99.6 FL (ref 79–97)
MONOCYTES # BLD AUTO: 1.54 10*3/MM3 (ref 0.1–0.9)
MONOCYTES NFR BLD AUTO: 39.4 % (ref 5–12)
NEUTROPHILS NFR BLD AUTO: 1.15 10*3/MM3 (ref 1.7–7)
NEUTROPHILS NFR BLD AUTO: 29.4 % (ref 42.7–76)
NRBC BLD AUTO-RTO: 0 /100 WBC (ref 0–0.2)
PLATELET # BLD AUTO: 151 10*3/MM3 (ref 140–450)
PMV BLD AUTO: 9.3 FL (ref 6–12)
POTASSIUM SERPL-SCNC: 3.7 MMOL/L (ref 3.5–4.7)
RBC # BLD AUTO: 2.55 10*6/MM3 (ref 3.77–5.28)
SODIUM SERPL-SCNC: 140 MMOL/L (ref 134–145)
WBC NRBC COR # BLD: 3.91 10*3/MM3 (ref 3.4–10.8)

## 2022-04-11 PROCEDURE — A9552 F18 FDG: HCPCS | Performed by: INTERNAL MEDICINE

## 2022-04-11 PROCEDURE — 85025 COMPLETE CBC W/AUTO DIFF WBC: CPT

## 2022-04-11 PROCEDURE — 78815 PET IMAGE W/CT SKULL-THIGH: CPT

## 2022-04-11 PROCEDURE — 0 FLUDEOXYGLUCOSE F18 SOLUTION: Performed by: INTERNAL MEDICINE

## 2022-04-11 PROCEDURE — 80048 BASIC METABOLIC PNL TOTAL CA: CPT

## 2022-04-11 PROCEDURE — 82962 GLUCOSE BLOOD TEST: CPT

## 2022-04-11 RX ADMIN — FLUDEOXYGLUCOSE F18 1 DOSE: 300 INJECTION INTRAVENOUS at 08:20

## 2022-04-12 RX ORDER — GABAPENTIN 300 MG/1
CAPSULE ORAL
Qty: 180 CAPSULE | Refills: 3 | Status: ON HOLD | OUTPATIENT
Start: 2022-04-12 | End: 2022-08-08 | Stop reason: SDUPTHER

## 2022-04-12 NOTE — TELEPHONE ENCOUNTER
Rx Refill Note  Requested Prescriptions     Pending Prescriptions Disp Refills   • buPROPion XL (WELLBUTRIN XL) 150 MG 24 hr tablet [Pharmacy Med Name: buPROPion HCL  MG TABLET] 90 tablet 1     Sig: TAKE ONE TABLET BY MOUTH DAILY      Last office visit with prescribing clinician: 2/16/2022      Next office visit with prescribing clinician: 5/25/2022            North Young MA  04/12/22, 12:06 EDT

## 2022-04-14 RX ORDER — BUPROPION HYDROCHLORIDE 150 MG/1
TABLET ORAL
Qty: 90 TABLET | Refills: 1 | OUTPATIENT
Start: 2022-04-14

## 2022-04-19 ENCOUNTER — INFUSION (OUTPATIENT)
Dept: ONCOLOGY | Facility: HOSPITAL | Age: 57
End: 2022-04-19

## 2022-04-19 ENCOUNTER — OFFICE VISIT (OUTPATIENT)
Dept: ONCOLOGY | Facility: CLINIC | Age: 57
End: 2022-04-19

## 2022-04-19 VITALS
TEMPERATURE: 96.6 F | DIASTOLIC BLOOD PRESSURE: 85 MMHG | BODY MASS INDEX: 29.88 KG/M2 | HEIGHT: 63 IN | WEIGHT: 168.6 LBS | HEART RATE: 74 BPM | RESPIRATION RATE: 18 BRPM | OXYGEN SATURATION: 96 % | SYSTOLIC BLOOD PRESSURE: 137 MMHG

## 2022-04-19 DIAGNOSIS — C34.12 PRIMARY ADENOCARCINOMA OF UPPER LOBE OF LEFT LUNG: Primary | ICD-10-CM

## 2022-04-19 DIAGNOSIS — C34.12 PRIMARY ADENOCARCINOMA OF UPPER LOBE OF LEFT LUNG: ICD-10-CM

## 2022-04-19 LAB
ALBUMIN SERPL-MCNC: 3.7 G/DL (ref 3.5–5.2)
ALBUMIN/GLOB SERPL: 1 G/DL (ref 1.1–2.4)
ALP SERPL-CCNC: 124 U/L (ref 38–116)
ALT SERPL W P-5'-P-CCNC: 11 U/L (ref 0–33)
ANION GAP SERPL CALCULATED.3IONS-SCNC: 10.7 MMOL/L (ref 5–15)
AST SERPL-CCNC: 18 U/L (ref 0–32)
BASOPHILS # BLD AUTO: 0.07 10*3/MM3 (ref 0–0.2)
BASOPHILS NFR BLD AUTO: 0.7 % (ref 0–1.5)
BILIRUB SERPL-MCNC: <0.2 MG/DL (ref 0.2–1.2)
BUN SERPL-MCNC: 13 MG/DL (ref 6–20)
BUN/CREAT SERPL: 9.6 (ref 7.3–30)
CALCIUM SPEC-SCNC: 9.5 MG/DL (ref 8.5–10.2)
CHLORIDE SERPL-SCNC: 105 MMOL/L (ref 98–107)
CO2 SERPL-SCNC: 27.3 MMOL/L (ref 22–29)
CREAT SERPL-MCNC: 1.36 MG/DL (ref 0.6–1.1)
DEPRECATED RDW RBC AUTO: 70 FL (ref 37–54)
EGFRCR SERPLBLD CKD-EPI 2021: 45.2 ML/MIN/1.73
EOSINOPHIL # BLD AUTO: 0.47 10*3/MM3 (ref 0–0.4)
EOSINOPHIL NFR BLD AUTO: 4.9 % (ref 0.3–6.2)
ERYTHROCYTE [DISTWIDTH] IN BLOOD BY AUTOMATED COUNT: 18.8 % (ref 12.3–15.4)
GLOBULIN UR ELPH-MCNC: 3.7 GM/DL (ref 1.8–3.5)
GLUCOSE SERPL-MCNC: 97 MG/DL (ref 74–124)
HCT VFR BLD AUTO: 28.2 % (ref 34–46.6)
HGB BLD-MCNC: 8.8 G/DL (ref 12–15.9)
IMM GRANULOCYTES # BLD AUTO: 0.14 10*3/MM3 (ref 0–0.05)
IMM GRANULOCYTES NFR BLD AUTO: 1.5 % (ref 0–0.5)
LYMPHOCYTES # BLD AUTO: 0.94 10*3/MM3 (ref 0.7–3.1)
LYMPHOCYTES NFR BLD AUTO: 9.9 % (ref 19.6–45.3)
MCH RBC QN AUTO: 32.1 PG (ref 26.6–33)
MCHC RBC AUTO-ENTMCNC: 31.2 G/DL (ref 31.5–35.7)
MCV RBC AUTO: 102.9 FL (ref 79–97)
MONOCYTES # BLD AUTO: 1.48 10*3/MM3 (ref 0.1–0.9)
MONOCYTES NFR BLD AUTO: 15.6 % (ref 5–12)
NEUTROPHILS NFR BLD AUTO: 6.4 10*3/MM3 (ref 1.7–7)
NEUTROPHILS NFR BLD AUTO: 67.4 % (ref 42.7–76)
NRBC BLD AUTO-RTO: 0 /100 WBC (ref 0–0.2)
PLATELET # BLD AUTO: 366 10*3/MM3 (ref 140–450)
PMV BLD AUTO: 9 FL (ref 6–12)
POTASSIUM SERPL-SCNC: 3.9 MMOL/L (ref 3.5–4.7)
PROT SERPL-MCNC: 7.4 G/DL (ref 6.3–8)
RBC # BLD AUTO: 2.74 10*6/MM3 (ref 3.77–5.28)
SODIUM SERPL-SCNC: 143 MMOL/L (ref 134–145)
WBC NRBC COR # BLD: 9.5 10*3/MM3 (ref 3.4–10.8)

## 2022-04-19 PROCEDURE — 99215 OFFICE O/P EST HI 40 MIN: CPT | Performed by: INTERNAL MEDICINE

## 2022-04-19 PROCEDURE — 96411 CHEMO IV PUSH ADDL DRUG: CPT

## 2022-04-19 PROCEDURE — 25010000002 PEMBROLIZUMAB 100 MG/4ML SOLUTION 4 ML VIAL: Performed by: INTERNAL MEDICINE

## 2022-04-19 PROCEDURE — 25010000002 PEMETREXED 100 MG RECONSTITUTED SOLUTION 100 MG VIAL: Performed by: INTERNAL MEDICINE

## 2022-04-19 PROCEDURE — 85025 COMPLETE CBC W/AUTO DIFF WBC: CPT

## 2022-04-19 PROCEDURE — 25010000002 PEMETREXED PER 10 MG: Performed by: INTERNAL MEDICINE

## 2022-04-19 PROCEDURE — 96413 CHEMO IV INFUSION 1 HR: CPT

## 2022-04-19 PROCEDURE — 96417 CHEMO IV INFUS EACH ADDL SEQ: CPT

## 2022-04-19 PROCEDURE — 80053 COMPREHEN METABOLIC PANEL: CPT

## 2022-04-19 RX ORDER — CYANOCOBALAMIN 1000 UG/ML
1000 INJECTION, SOLUTION INTRAMUSCULAR; SUBCUTANEOUS ONCE
Status: CANCELLED | OUTPATIENT
Start: 2022-04-19 | End: 2022-04-19

## 2022-04-19 RX ORDER — SODIUM CHLORIDE 9 MG/ML
250 INJECTION, SOLUTION INTRAVENOUS ONCE
Status: COMPLETED | OUTPATIENT
Start: 2022-04-19 | End: 2022-04-19

## 2022-04-19 RX ORDER — SODIUM CHLORIDE 9 MG/ML
250 INJECTION, SOLUTION INTRAVENOUS ONCE
Status: CANCELLED | OUTPATIENT
Start: 2022-04-19

## 2022-04-19 RX ORDER — CYANOCOBALAMIN 1000 UG/ML
1000 INJECTION, SOLUTION INTRAMUSCULAR; SUBCUTANEOUS ONCE
Status: DISCONTINUED | OUTPATIENT
Start: 2022-04-19 | End: 2022-04-19 | Stop reason: HOSPADM

## 2022-04-19 RX ORDER — DEXAMETHASONE 4 MG/1
TABLET ORAL
COMMUNITY
Start: 2022-02-16 | End: 2022-06-28

## 2022-04-19 RX ADMIN — SODIUM CHLORIDE 200 MG: 9 INJECTION, SOLUTION INTRAVENOUS at 09:39

## 2022-04-19 RX ADMIN — SODIUM CHLORIDE 250 ML: 9 INJECTION, SOLUTION INTRAVENOUS at 09:39

## 2022-04-19 RX ADMIN — SODIUM CHLORIDE 790 MG: 9 INJECTION, SOLUTION INTRAVENOUS at 10:21

## 2022-04-19 NOTE — PROGRESS NOTES
Subjective   Holli Patel is a 56 y.o. female.  Referred by Dr. 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    3/1/2021-bilateral screening mammogram-microcalcifications seen in the posterior one third of the lateral aspect of the right breast.  Area of focal asymmetry seen in the middle one third of the upper inner quadrant of the left breast.    3/1/2021-DEXA scan-T score of -2.2 on the lumbar spine and T score of -2.3 in the left hip and T score of -1.9 in the right hip.  Findings consistent with osteopenia    3/23/2021-screening lung CT-there is a 10 x 11 mm solid nodule in the left upper lobe.  Enlarged AP window lymph node measuring 14 x 10 mm.  Suggestion of a possible 9 mm left hilar lymph node.  Heavy coronary artery calcification.    3/23/2021-diagnostic mammogram bilateral-cluster of microcalcifications in the middle third lateral aspect of the right breast.  Left breast demonstrates persistence of the area of focal asymmetry in the region.    Ultrasound-left breast ultrasound at 10 o'clock position, 6 cm from the nipple there is a 0.4 cm irregular hypoechoic lesion.  Stereotactic biopsy of the right breast calcifications recommended.  Ultrasound-guided biopsy of the left breast lesion recommended    4/7/2021-right breast stereotactic biopsy and left breast ultrasound-guided biopsy  Pathology  Right breast-invasive ductal carcinoma, grade 2, lymphovascular invasion present, ER +99% strong, MO +80% moderate, HER-2 negative, Ki-67 12%  Left breast upper inner quadrant 10 o'clock position biopsy, invasive ductal carcinoma, grade   2, ER +99% strong, MO +40% strong, HER-2 2+ on immunohistochemistry, nonamplified on FISH Ki-67 10%    4/14/2021-PET/CT-FDG avid aortopulmonary window lymph node measures 0.9 cm with an SUV of 7.5.  FDG avid left hilar lymph node  measures 1 cm with an SUV of 17.2.  Irregular soft tissue density in the right breast measuring 3.7 x 3.8 cm is favored to be secondary to the recent breast biopsy.  1.1 cm pulmonary nodule within the left upper lobe with SUV 9.7 .  Sub-6 mm pulmonary nodules are present in the right lower lobe.  No evidence of lymphadenopathy or metastatic disease in the abdomen.  Focal area of FDG uptake within the central canal posterior to T11-T12 displaced demonstrating an SUV of 5.5 thought to be reactive however MRI is recommended for further evaluation.    She was seen by Dr. Molina who initially planned for breast surgery however  due to the PET abnormalities she has been referred to Dr. Kerr who plans to do a wound on 4/30/2021.  Dr. Molina's and Dr. Kerr's notes reviewed.    Patient denies any family history of breast cancer.  Her mother had lymphoma.  Maternal grandmother had colon cancer.  Prior to that screening mammogram she did not have any palpable abnormalities of either breast.    She has been a heavy smoker for about 40 years and smoked 2 packs/day.  Denies any recent weight changes, new bone pains, cough, abdominal pain nausea vomiting constipation or diarrhea.  She does have anxiety and has been on several medications.  She has recently been started on Xanax to help with the mood and also with insomnia.    Patient had a video-assisted thoracoscopy and mediastinal lymphadenectomy on 4/30/2021.  L5-L6 lymph nodes were biopsied however the small pulmonary nodule in the left upper lobe was not difficult to find.  Pathology showed moderately differentiated adenocarcinoma which is CK7 and TTF-1 positive.  Consistent with lung primary.  Ki-67 85%.    5/14/2021-MRI of the brain-minimal chronic small vessel ischemic change in the white matter.  Otherwise negative MRI.    5/20/2021-bilateral axillary ultrasound-no evidence of axillary lymphadenopathy in either axilla.  Normal-appearing bilateral axillary lymph  nodes visualized.    Cycle 1 cisplatin and Alimta on 5/25/2021.    Cycle 2 cisplatin Alimta 6/15/2021    Cycle 3 cisplatin Alimta 7/7/2021    Completed radiation on 7/12/2021    Port was nonfunctional hence a port study was performed which showed that the port was backed up into the innominate vein and also there was a nonocclusive thrombus at the end of the catheter extending into the superior vena cava.  She was started on anticoagulation with Eliquis.  It was recommended for port revision of the intention is to keep the port.    PET/CT 8/5/2021-images independently reviewed and interpreted by me-decrease in size and FDG uptake of the left upper lobe pulmonary nodule as well as mediastinal and left hilar lymphadenopathy representing response to treatment.  Sub-6 mm pulmonary nodule in the left upper lobe new since 4/14/2021.  This could be related to radiation however follow-up CT in 3 months recommended.  Decrease in size of the irregular masslike tissue in the right breast which is postbiopsy hematoma.  This is not PET avid.  New segmental left eighth rib fractures healing.  A new band of sclerosis of the left seventh rib which favored to represent healing nondisplaced fracture.  Follow-up CT recommended.  focal uptake in the duodenum first and second portions.  Could be related to duodenitis.  Indeterminate lesion in the L1 vertebral body not well evaluated on PET/CT.    10/1/2021-MRI of the lumbar spine.  Lesion in the left posterior body of L1 measures 14 x 14 x 12 mm and previously 5 x 5 x 6 mm.  The interval enlargement strongly suggest that it is metastatic lesion.  No additional osseous metastasis noted.   Other chronic changes noted.    10/14/2021-biopsy of the lumbar spine lesion-pathology consistent with poorly differentiated carcinoma.  The staining is similar to the prior tumors and fevers this being metastatic poorly differentiated pulmonary adenocarcinoma.    10/26/2021-brain MRI-no evidence of  metastatic disease.    10/26/2021-bilateral diagnostic mammogram and ultrasound  Scattered fibroglandular density in both breast.  Postbiopsy hematoma with internal biopsy clip in the upper outer quadrant of the right breast, 8 cm from the nipple.  The hematoma size is decreased to 2.9 cm from 3.6 cm earlier.    Left breast-parenchymal density measuring 9 x 10 mm has markedly decreased.  Few adjacent calcifications which are unchanged.  No new abnormality in the left breast.  At 10:00 region there is some minimal adjacent hypoechoic texture which is difficult to measure but appears smaller since the previous exam.    10/28/2021-PET/CT  New L1 and L2 lytic bone metastasis annually intensely hypermetabolic focus at the left adrenal gland likely represents metastatic disease as well.  Slight decrease in size of the 0.9 x 0.7 cm left upper lobe pulmonary nodule.  There is hypermetabolic activity related to radiation pneumonitis.  The remainder of the nodule is photopenic.  Uncertain etiology of the more intense activity along the right lateral peripheral margin of the 2.6 cm postbiopsy density of the right breast.    She completed radiation to to the lumbar spine on 11/19/2021 11/22/2021-cycle 1 Alimta and Keytruda    3/23/2022-bilateral diagnostic mammogram and ultrasound  Complete resolution of the microcalcifications in the upper outer quadrant of the right breast and decrease in size of the postbiopsy hematoma.  No suspicious abnormalities in the right breast.  Benign-appearing calcifications at the site of malignancy in the left breast upper inner quadrant.  No other suspicious findings.    Interval history:  Holli Patel is a 56-year-old female with the above-mentioned history returns to the office today to review the PET/CT results and treatment.  The periorbital edema has decreased.  Breathing remains stable.  She does complain of fatigue which has not changed significantly over the past few months.  No  lower extremity edema.  No new pains.  Mood remains stable.        The following portions of the patient's history were reviewed and updated as appropriate: allergies, current medications, past family history, past medical history, past social history, past surgical history and problem list.    Past Medical History:   Diagnosis Date   • Anxiety    • Clot     POSSIBLE BLOOD CLOT IN VENOUS ACCESS DEVICE-PT STATES WAS STARTED ON ELIQUIS    • Dyspnea on exertion    • Elevated cholesterol    • Frequent urination     DAY AND NIGHT   • SHAYY (generalized anxiety disorder)     RELATED HIGH BLOOD PRESSURE   • GERD (gastroesophageal reflux disease)    • High blood pressure     ANXIETY RELATED   • Hyperlipidemia    • Hypothyroidism    • Lung cancer (HCC)    • Lung nodule     LEFT   • Malignant neoplasm of upper-outer quadrant of right breast in female, estrogen receptor positive (HCC) 4/13/2021    PT STATES HAS BILAT BREAST CANCER   • Migraine    • PONV (postoperative nausea and vomiting)         Past Surgical History:   Procedure Laterality Date   • BREAST BIOPSY Bilateral 04/07/2021    Right Breast 10 o'clock & Left breast 10 o'clock 6 cm from nipple, [facility]   • CLOSED REDUCTION HIP DISLOCATION Left 4/30/2021    Procedure: BRONCHOSCOPY, LEFT VIDEO ASSITED THORACOSCOPY, ROBOTIC ASSSITED MEDIASTINAL LYMPHADENECTOMY WITH INTERCOSTAL NERVE BLOCKS;  Surgeon: Raphael Kerr III, MD;  Location: [facility];  Service: DaVinci;  Laterality: Left;   • COLONOSCOPY     • TUBAL ABDOMINAL LIGATION     • VENOUS ACCESS DEVICE (PORT) INSERTION Right 5/20/2021    Procedure: INSERTION VENOUS ACCESS DEVICE;  Surgeon: Raphael Kerr III, MD;  Location: [facility] OR;  Service: Thoracic;  Laterality: Right;   • VENOUS ACCESS DEVICE (PORT) INSERTION Right 11/29/2021    Procedure: REVISION AND REPLACEMENT RIGHT SUBCLAVIAN VENOUS ACCESS PORT;  Surgeon: Raphael Kerr III, MD;  Location: [facility] OR;  Service: Thoracic;  Laterality: Right;    • WRIST SURGERY Right         Family History   Problem Relation Age of Onset   • Cancer Father         LYMPHATIC   • Prostate cancer Father    • Lymphoma Father    • Alcohol abuse Brother    • Colon cancer Maternal Grandmother    • Malig Hyperthermia Neg Hx         Social History     Socioeconomic History   • Marital status:      Spouse name: Jelani   Tobacco Use   • Smoking status: Former Smoker     Packs/day: 1.00     Years: 30.00     Pack years: 30.00     Types: Electronic Cigarette, Cigarettes     Start date: 1981     Quit date:      Years since quittin.2   • Smokeless tobacco: Never Used   • Tobacco comment: ABT 15 CIGARETTES DAILY   Vaping Use   • Vaping Use: Some days   • Substances: Nicotine, Flavoring   • Devices: Disposable   Substance and Sexual Activity   • Alcohol use: Never   • Drug use: Never   • Sexual activity: Yes     Partners: Male        OB History        2    Para   2    Term   2            AB        Living           SAB        IAB        Ectopic        Molar        Multiple        Live Births                Age of menarche-10  Age at menopause-42  Oral contraceptive pill use-15 years  No HRT use  She has 2 children  Age at first live childbirth-17    No Known Allergies     Review of Systems   Review of systems as mentioned in the HPI    Objective   not currently breastfeeding.       Physical Exam  Vitals reviewed.   HENT:      Head: Normocephalic.   Eyes:      Pupils: Pupils are equal, round, and reactive to light.   Cardiovascular:      Rate and Rhythm: Normal rate and regular rhythm.      Pulses: Normal pulses.      Heart sounds: Normal heart sounds.   Pulmonary:      Effort: Pulmonary effort is normal.      Breath sounds: Normal breath sounds.   Abdominal:      General: Abdomen is flat.   Musculoskeletal:         General: No swelling. Normal range of motion.      Cervical back: Normal range of motion.   Skin:     General: Skin is warm.   Neurological:       General: No focal deficit present.      Mental Status: She is alert and oriented to person, place, and time.   Psychiatric:         Mood and Affect: Mood normal.         Behavior: Behavior normal.         I have reexamined the patient and the results are consistent with the previously documented exam. Salma Snell MD                    NM PET/CT Skull Base to Mid Thigh    Result Date: 4/12/2022  1. The new hypermetabolic left upper lobe ground-glass opacities in the more prominent metabolic activity along the bandlike scarring surrounding the stable 7 mm residual nodule are suspected to be related to radiation. There are similar but less prominent ground-glass opacities at the posterior aspect of the right upper lobe and the superior aspect of both lower lobes which may be within the radiation field as well. The activity at the proximal esophagus is likely related as well. The photopenic 7 mm left upper lobe nodule is stable and there is no evidence for metastatic lymphadenopathy within the chest. 2. The left adrenal gland appears more nodular than previously and the adrenal body is significantly more hypermetabolic. This in conjunction with the new hypermetabolic focus at the right aspect of the L1 vertebral body, where there is a treated metastasis at the left aspect of the vertebral body, are suspicious for metastatic disease. The new tiny focus of hypermetabolic activity at the right iliac body is indeterminate given the tiny size, but is worrisome for a metastasis.  This report was finalized on 4/12/2022 11:18 AM by Dr. Chaya Lee M.D.      US Breast Left Limited    Result Date: 3/24/2022  1. There has been interval resolution of all microcalcifications from the site of biopsy-proven malignancy in the upper-outer quadrant of the right breast. Slight decrease in size of the postbiopsy hematoma and/or area of evolving fat necrosis at the site of biopsy-proven malignancy is noted. No new suspicious findings  in the right breast are visualized. Mammographically, the imaging features suggest imaging complete response to neoadjuvant chemotherapy. Continued clinical and imaging follow-up as clinically indicated is recommended. 2. There are benign-appearing calcifications in association with the biopsy clip at the site of biopsy-proven malignancy in the upper inner quadrant of the left breast. No evidence for a residual portion of the malignancy at the site of biopsy-proven malignancy is appreciated. No additional or new suspicious findings in the left breast are appreciated. A nonspecific 0.2 cm area of focal asymmetry/increased density is seen posterior to the biopsy clip in the upper inner quadrant of the left breast. Mammographically and sonographically the imaging features suggest near-complete response to medical therapy. Clinical follow-up and imaging follow-up as clinically indicated is recommended.  BI-RADS Category 6: Known malignancy.  This report was finalized on 3/24/2022 2:46 PM by Dr. Jeffrey Quevedo M.D.      Mammo Diagnostic Digital Tomosynthesis Bilateral With CAD    Result Date: 3/24/2022  1. There has been interval resolution of all microcalcifications from the site of biopsy-proven malignancy in the upper-outer quadrant of the right breast. Slight decrease in size of the postbiopsy hematoma and/or area of evolving fat necrosis at the site of biopsy-proven malignancy is noted. No new suspicious findings in the right breast are visualized. Mammographically, the imaging features suggest imaging complete response to neoadjuvant chemotherapy. Continued clinical and imaging follow-up as clinically indicated is recommended. 2. There are benign-appearing calcifications in association with the biopsy clip at the site of biopsy-proven malignancy in the upper inner quadrant of the left breast. No evidence for a residual portion of the malignancy at the site of biopsy-proven malignancy is appreciated. No additional  or new suspicious findings in the left breast are appreciated. A nonspecific 0.2 cm area of focal asymmetry/increased density is seen posterior to the biopsy clip in the upper inner quadrant of the left breast. Mammographically and sonographically the imaging features suggest near-complete response to medical therapy. Clinical follow-up and imaging follow-up as clinically indicated is recommended.  BI-RADS Category 6: Known malignancy.  This report was finalized on 3/24/2022 2:46 PM by Dr. Jeffrey Quevedo M.D.      1/24/2022 CBC reviewed and stable WBC of 7.5, hemoglobin 9.6 and platelets 354,000  CMP reviewed and potassium 3.3 but otherwise within normal limits.    PET/CT 4/11/2022-images independently reviewed and interpreted by me.  There is new groundglass opacities in the left upper lobe which are most likely postradiation changes.  Other groundglass opacities in the left lung as well as the right lung remained stable.  Increased nodularity of the left adrenal gland with an SUV of 5.6, previously 3.5.    Increased uptake in the L1 vertebra in the right posterior aspect with an SUV of 3.6.  Left L1 sclerosis increased.    Assessment/Plan      *Bilateral breast cancer  · Invasive ductal carcinoma in both right and left breast and lesions measure under 1 cm.  No palpable lymphadenopathy although unsure if this has been evaluated by an axillary ultrasound.  No abnormal axillary lymphadenopathy noted on PET.  · Most likely clinical T1b N0 M0, both cancers were noted to be grade 2, ER/CO positive and HER-2 negative and Ki-67 less than 15%  VATS on 4/30/2021  Biopsy confirms adenocarcinoma of pulmonary primary  Discussed with Dr. Molina about delaying breast surgery and she is in agreement  Darted on neoadjuvant anastrozole  · Patient experienced significant adverse effects with anastrozole  · Treatment changed to letrozole   · Axillary ultrasound 4/20/2021 -negative for any axillary lymphadenopathy  · Started  letrozole in May 2021  · She continues on letrozole without any problems.  · 8/24/2021 bilateral diagnostic mammogram and ultrasound.  In the left breast the tumor now measures 1 cm on mammogram as opposed to 0.8 cm in March.  Right breast calcifications also noted to be in a large extent although the number seems to be decreased.  Hematoma has decreased in size.  · 10/26/2021-bilateral diagnostic mammogram and ultrasound-decrease in size of the bilateral breast lesions.  · 10/26/2021-PET/CT-metastatic lesions in the left adrenal gland, L1-L2.  · 10/26/2021-MRI of the brain-no evidence of metastatic disease  · 3/23/2022-bilateral diagnostic mammogram and ultrasound show complete resolution of the tumors.  · Continue letrozole, continues to tolerate that well except for fatigue.    *Adenocarcinoma of the left lung, metastatic   · Most likely stage T1 N2 M0, stage III  · MRI of the thoracic spine ordered to further evaluate the abnormality seen on PET  · MRI of the brain negative   · Given that she is only 55 and fairly good performance status I discussed with her by with concurrent chemoradiation with cisplatin and Alimta every 3 weeks followed by durvalumab.  · Adverse effects including but not limited to myelosuppression, increased risk of infections, nausea, vomiting, renal dysfunction, ototoxicity, neurotoxicity have been discussed at length.  · MRI of the thoracic spine performed 5/24/2021 shows no evidence of metastatic disease in the thoracic spine however in L1 there is a 7 mm lesion which is indeterminate.  A lumbar spine MRI has been recommended.  · Discussed her case with Dr. Oates and he feels like MRI of the lumbar spine may also show that this lesion is indeterminate.  · Discussed the same with the patient and her .  · Even if this is metastatic disease this might be the only lesion of metastatic disease and then would consider this oligometastatic disease.  · Therefore plan to proceed with  concurrent chemoradiation as scheduled.  · cisplatin and Alimta, C1D1 5/25/2021  · Day 1 of radiation 5/25/2021  · 5/27/2021-MRI of the lumbar spine-there is a 6 x 5 x 6 mm enhancing lesion in the left posterior body of L1 vertebra.  This is considered indeterminate.  Follow-up recommended.  Degenerative changes noted at L5-S1  · 7/7/2021, cycle 3 cisplatin Alimta  · 7/12/2021-radiation completed  · PET/CT 8/6/2021 with good response to chemoradiation.  · MRI of the lumbar spine 10/1/2021 with increase in size of the L1 lesion which now measures 14 mm as opposed to 6 mm in May 2021.  · 10/14/2021-biopsy of the L1 lesion and pathology consistent with adenocarcinoma of the lung, TPS 0  · 0/26/2021-PET/CT-metastatic lesions in the left adrenal gland, L1-L2.  · 10/26/2021-MRI of the brain-no evidence of metastatic disease  · Given that she only has metastatic disease in L1-L2 and left adrenal gland I think it would be possible to radiate all these regions.  · Completed radiation to L1-L2 on 11/19/2021  · 11/22/2021-cycle 1 of Keytruda and Alimta  · Completed radiation to the left adrenal gland  · 1/18/2022-PET/CT-images independently reviewed and interpreted by me.  Decrease in size of the 7 mm pulmonary nodule in the left upper lobe which previously measured 9 mm.  Radiation pneumonitis is nearly resolved.  Hypermetabolic activity at the L1-L2 metastasis has resolved.  No new suspicious metastatic disease.  · Guardant 360 circulating DNA level decreasing.  · 3/7/2022-patient is complaining of fluid retention.  · Thought to be secondary to Alimta and started on steroids and Lasix.  · Developed CHASITY on Lasix since Lasix discontinued.  · Alimta dose has been reduced.   · PET/CT 4/11/2022 concerning for disease progression in the left adrenal gland as well as L1 vertebra on the left.  · Plan to discuss imaging and multidisciplinary conference and make a decision on possible radiation/change in systemic therapy.    *Fluid  retention  · Started on Lasix at last visit  · Creatinine increased to 2.05  · Discontinue Lasix  · 4/4/2022 creatinine improved to 1.69  · 4/11/2022-creatinine    *CHASITY  · Most likely secondary to Lasix  · Discontinue Lasix  · 1 L normal saline 3/28/2022  · Kidney function improved, Creatinine 1.69 and BUN 23.  Encourage patient to increase oral intake.  Discouraged intake of caffeinated beverages.  Patient to return in 1 week for labs only around the time of her schedule PET scan appointment.  · 4/11/2022-creatinine 1.36.    *Anemia  · Hemoglobin 7.9   · Symptomatic  · 2 units of PRBC  · Hgb improved to 9.8 today   · 4/11/2022 hemoglobin stable at 8.8    *Bilateral breast cancer-no family history of breast cancer but given synchronous breast cancer genetic testing has been sent.  Genetic testing with a variant of unknown significance.    *Right hand numbness and difficulty with grasping objects  · Previously had history of carpal tunnel requiring repair.  · She is currently on letrozole which could cause worsening of the carpal tunnel  · Gabapentin dose will be increased to 600 mg 3 times daily  · She will also be referred to hand surgery for further evaluation.  · She now reports bilateral upper extremity numbness.  · Continue gabapentin  · MRI of the brain 10/26/2021 without any evidence of metastatic disease  · Not an issue today    *Anxiety-  · Currently on Xanax 1 mg nightly  · She required Valium for the MRI as she had claustrophobia and a panic attack  · Continue Zyprexa.  · Anxiety unchanged    *Depression  · Continue Zyprexa  · Related to cancer diagnosis and current situation where she is having to care for her elderly mother.  · Unchanged    *Left-sided chest wall pain-secondary to VATS.  Most likely neuropathic.  Continue gabapentin and Norco as needed.  She ran out of gabapentin and experienced leg cramps.    Continue gabapentin  Improved      *Hypertension-continue lisinopril.  Blood pressure elevated  at 137/85.    *Smoking-not addressed today    *GERD  · 7/7/2021, patient complains of reflux despite the use of Prilosec 20 mg twice daily.  We will send her a prescription for Carafate slurry 4 times daily.  · She has not quite started using the Carafate.  · Continue Prilosec and Carafate  · PET/CT 4/11/2022 shows increased uptake in the proximal esophagus.  Likely secondary to GERD.  · Continue current medications    *Port not returning blood    · A new Mediport was placed by thoracic surgery 11/29/2021, Eliquis has been discontinued and right chest Mediport is now safe to use.      PLAN:  1. Proceed with Alimta and Keytruda today  2. Present case in multidisciplinary conference  3. Continue PPI   4. Continue Zyprexa for anxiety and depression  5. Follow-up in 3 weeks    40 minutes spent on the encounter including reviewing the images, medical records and face-to-face time and documentation on the same day.    Salma Snell MD

## 2022-04-27 ENCOUNTER — MDT ASSESSMENT (OUTPATIENT)
Dept: OTHER | Facility: HOSPITAL | Age: 57
End: 2022-04-27

## 2022-05-02 RX ORDER — OMEPRAZOLE 20 MG/1
20 CAPSULE, DELAYED RELEASE ORAL 2 TIMES DAILY
Qty: 180 CAPSULE | Refills: 1 | Status: SHIPPED | OUTPATIENT
Start: 2022-05-02 | End: 2022-05-25 | Stop reason: SDUPTHER

## 2022-05-10 ENCOUNTER — OFFICE VISIT (OUTPATIENT)
Dept: ONCOLOGY | Facility: CLINIC | Age: 57
End: 2022-05-10

## 2022-05-10 ENCOUNTER — INFUSION (OUTPATIENT)
Dept: ONCOLOGY | Facility: HOSPITAL | Age: 57
End: 2022-05-10

## 2022-05-10 VITALS
SYSTOLIC BLOOD PRESSURE: 92 MMHG | HEART RATE: 65 BPM | BODY MASS INDEX: 29.06 KG/M2 | DIASTOLIC BLOOD PRESSURE: 64 MMHG | WEIGHT: 164 LBS | HEIGHT: 63 IN | TEMPERATURE: 98 F | RESPIRATION RATE: 16 BRPM | OXYGEN SATURATION: 96 %

## 2022-05-10 DIAGNOSIS — C50.411 MALIGNANT NEOPLASM OF UPPER-OUTER QUADRANT OF RIGHT BREAST IN FEMALE, ESTROGEN RECEPTOR POSITIVE: ICD-10-CM

## 2022-05-10 DIAGNOSIS — R79.89 ELEVATED SERUM CREATININE: ICD-10-CM

## 2022-05-10 DIAGNOSIS — C34.12 PRIMARY ADENOCARCINOMA OF UPPER LOBE OF LEFT LUNG: Primary | ICD-10-CM

## 2022-05-10 DIAGNOSIS — R59.0 MEDIASTINAL ADENOPATHY: ICD-10-CM

## 2022-05-10 DIAGNOSIS — Z79.899 HIGH RISK MEDICATION USE: ICD-10-CM

## 2022-05-10 DIAGNOSIS — Z17.0 MALIGNANT NEOPLASM OF UPPER-OUTER QUADRANT OF RIGHT BREAST IN FEMALE, ESTROGEN RECEPTOR POSITIVE: ICD-10-CM

## 2022-05-10 DIAGNOSIS — C34.12 PRIMARY ADENOCARCINOMA OF UPPER LOBE OF LEFT LUNG: ICD-10-CM

## 2022-05-10 DIAGNOSIS — C80.1 ANXIETY ASSOCIATED WITH CANCER DIAGNOSIS: ICD-10-CM

## 2022-05-10 DIAGNOSIS — F41.1 ANXIETY ASSOCIATED WITH CANCER DIAGNOSIS: ICD-10-CM

## 2022-05-10 DIAGNOSIS — Z45.2 ENCOUNTER FOR FITTING AND ADJUSTMENT OF VASCULAR CATHETER: ICD-10-CM

## 2022-05-10 DIAGNOSIS — C34.90 NON-SMALL CELL LUNG CANCER METASTATIC TO BONE: ICD-10-CM

## 2022-05-10 DIAGNOSIS — C79.51 NON-SMALL CELL LUNG CANCER METASTATIC TO BONE: ICD-10-CM

## 2022-05-10 LAB
ALBUMIN SERPL-MCNC: 3.9 G/DL (ref 3.5–5.2)
ALBUMIN/GLOB SERPL: 1.1 G/DL (ref 1.1–2.4)
ALP SERPL-CCNC: 122 U/L (ref 38–116)
ALT SERPL W P-5'-P-CCNC: 12 U/L (ref 0–33)
ANION GAP SERPL CALCULATED.3IONS-SCNC: 10.2 MMOL/L (ref 5–15)
AST SERPL-CCNC: 20 U/L (ref 0–32)
BASOPHILS # BLD AUTO: 0.12 10*3/MM3 (ref 0–0.2)
BASOPHILS NFR BLD AUTO: 1.1 % (ref 0–1.5)
BILIRUB SERPL-MCNC: <0.2 MG/DL (ref 0.2–1.2)
BUN SERPL-MCNC: 28 MG/DL (ref 6–20)
BUN/CREAT SERPL: 14.6 (ref 7.3–30)
CALCIUM SPEC-SCNC: 9.9 MG/DL (ref 8.5–10.2)
CHLORIDE SERPL-SCNC: 103 MMOL/L (ref 98–107)
CO2 SERPL-SCNC: 23.8 MMOL/L (ref 22–29)
CREAT SERPL-MCNC: 1.92 MG/DL (ref 0.6–1.1)
DEPRECATED RDW RBC AUTO: 74.1 FL (ref 37–54)
EGFRCR SERPLBLD CKD-EPI 2021: 29.9 ML/MIN/1.73
EOSINOPHIL # BLD AUTO: 0.55 10*3/MM3 (ref 0–0.4)
EOSINOPHIL NFR BLD AUTO: 5.2 % (ref 0.3–6.2)
ERYTHROCYTE [DISTWIDTH] IN BLOOD BY AUTOMATED COUNT: 18.4 % (ref 12.3–15.4)
GLOBULIN UR ELPH-MCNC: 3.7 GM/DL (ref 1.8–3.5)
GLUCOSE SERPL-MCNC: 91 MG/DL (ref 74–124)
HCT VFR BLD AUTO: 30.9 % (ref 34–46.6)
HGB BLD-MCNC: 9.1 G/DL (ref 12–15.9)
IMM GRANULOCYTES # BLD AUTO: 0.16 10*3/MM3 (ref 0–0.05)
IMM GRANULOCYTES NFR BLD AUTO: 1.5 % (ref 0–0.5)
LYMPHOCYTES # BLD AUTO: 1.2 10*3/MM3 (ref 0.7–3.1)
LYMPHOCYTES NFR BLD AUTO: 11.4 % (ref 19.6–45.3)
MCH RBC QN AUTO: 32.2 PG (ref 26.6–33)
MCHC RBC AUTO-ENTMCNC: 29.4 G/DL (ref 31.5–35.7)
MCV RBC AUTO: 109.2 FL (ref 79–97)
MONOCYTES # BLD AUTO: 1.48 10*3/MM3 (ref 0.1–0.9)
MONOCYTES NFR BLD AUTO: 14 % (ref 5–12)
NEUTROPHILS NFR BLD AUTO: 66.8 % (ref 42.7–76)
NEUTROPHILS NFR BLD AUTO: 7.06 10*3/MM3 (ref 1.7–7)
NRBC BLD AUTO-RTO: 0 /100 WBC (ref 0–0.2)
PLATELET # BLD AUTO: 365 10*3/MM3 (ref 140–450)
PMV BLD AUTO: 8.9 FL (ref 6–12)
POTASSIUM SERPL-SCNC: 4.7 MMOL/L (ref 3.5–4.7)
PROT SERPL-MCNC: 7.6 G/DL (ref 6.3–8)
RBC # BLD AUTO: 2.83 10*6/MM3 (ref 3.77–5.28)
SODIUM SERPL-SCNC: 137 MMOL/L (ref 134–145)
T4 FREE SERPL-MCNC: 1.26 NG/DL (ref 0.93–1.7)
TSH SERPL DL<=0.05 MIU/L-ACNC: 3.22 UIU/ML (ref 0.27–4.2)
WBC NRBC COR # BLD: 10.57 10*3/MM3 (ref 3.4–10.8)

## 2022-05-10 PROCEDURE — 96413 CHEMO IV INFUSION 1 HR: CPT

## 2022-05-10 PROCEDURE — 25010000002 PEMBROLIZUMAB 100 MG/4ML SOLUTION 4 ML VIAL: Performed by: INTERNAL MEDICINE

## 2022-05-10 PROCEDURE — 85025 COMPLETE CBC W/AUTO DIFF WBC: CPT

## 2022-05-10 PROCEDURE — 96411 CHEMO IV PUSH ADDL DRUG: CPT

## 2022-05-10 PROCEDURE — 25010000002 PEMETREXED PER 10 MG: Performed by: INTERNAL MEDICINE

## 2022-05-10 PROCEDURE — 96361 HYDRATE IV INFUSION ADD-ON: CPT

## 2022-05-10 PROCEDURE — 25010000002 CYANOCOBALAMIN PER 1000 MCG: Performed by: INTERNAL MEDICINE

## 2022-05-10 PROCEDURE — 80053 COMPREHEN METABOLIC PANEL: CPT

## 2022-05-10 PROCEDURE — 84439 ASSAY OF FREE THYROXINE: CPT | Performed by: INTERNAL MEDICINE

## 2022-05-10 PROCEDURE — 99215 OFFICE O/P EST HI 40 MIN: CPT | Performed by: INTERNAL MEDICINE

## 2022-05-10 PROCEDURE — 84443 ASSAY THYROID STIM HORMONE: CPT | Performed by: INTERNAL MEDICINE

## 2022-05-10 PROCEDURE — 96372 THER/PROPH/DIAG INJ SC/IM: CPT

## 2022-05-10 RX ORDER — SODIUM CHLORIDE 9 MG/ML
250 INJECTION, SOLUTION INTRAVENOUS ONCE
Status: CANCELLED | OUTPATIENT
Start: 2022-05-10

## 2022-05-10 RX ORDER — SODIUM CHLORIDE 9 MG/ML
1000 INJECTION, SOLUTION INTRAVENOUS ONCE
Status: COMPLETED | OUTPATIENT
Start: 2022-05-10 | End: 2022-05-10

## 2022-05-10 RX ORDER — CYANOCOBALAMIN 1000 UG/ML
1000 INJECTION, SOLUTION INTRAMUSCULAR; SUBCUTANEOUS ONCE
Status: COMPLETED | OUTPATIENT
Start: 2022-05-10 | End: 2022-05-10

## 2022-05-10 RX ADMIN — SODIUM CHLORIDE 200 MG: 9 INJECTION, SOLUTION INTRAVENOUS at 11:42

## 2022-05-10 RX ADMIN — SODIUM CHLORIDE 1000 ML: 0.9 INJECTION, SOLUTION INTRAVENOUS at 10:25

## 2022-05-10 RX ADMIN — CYANOCOBALAMIN 1000 MCG: 1000 INJECTION, SOLUTION INTRAMUSCULAR at 12:14

## 2022-05-10 RX ADMIN — SODIUM CHLORIDE 790 MG: 9 INJECTION, SOLUTION INTRAVENOUS at 12:13

## 2022-05-10 NOTE — PROGRESS NOTES
Subjective   Holli Patel is a 56 y.o. female.  Referred by Dr. 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    3/1/2021-bilateral screening mammogram-microcalcifications seen in the posterior one third of the lateral aspect of the right breast.  Area of focal asymmetry seen in the middle one third of the upper inner quadrant of the left breast.    3/1/2021-DEXA scan-T score of -2.2 on the lumbar spine and T score of -2.3 in the left hip and T score of -1.9 in the right hip.  Findings consistent with osteopenia    3/23/2021-screening lung CT-there is a 10 x 11 mm solid nodule in the left upper lobe.  Enlarged AP window lymph node measuring 14 x 10 mm.  Suggestion of a possible 9 mm left hilar lymph node.  Heavy coronary artery calcification.    3/23/2021-diagnostic mammogram bilateral-cluster of microcalcifications in the middle third lateral aspect of the right breast.  Left breast demonstrates persistence of the area of focal asymmetry in the region.    Ultrasound-left breast ultrasound at 10 o'clock position, 6 cm from the nipple there is a 0.4 cm irregular hypoechoic lesion.  Stereotactic biopsy of the right breast calcifications recommended.  Ultrasound-guided biopsy of the left breast lesion recommended    4/7/2021-right breast stereotactic biopsy and left breast ultrasound-guided biopsy  Pathology  Right breast-invasive ductal carcinoma, grade 2, lymphovascular invasion present, ER +99% strong, WA +80% moderate, HER-2 negative, Ki-67 12%  Left breast upper inner quadrant 10 o'clock position biopsy, invasive ductal carcinoma, grade   2, ER +99% strong, WA +40% strong, HER-2 2+ on immunohistochemistry, nonamplified on FISH Ki-67 10%    4/14/2021-PET/CT-FDG avid aortopulmonary window lymph node measures 0.9 cm with an SUV of 7.5.  FDG avid left hilar lymph node  measures 1 cm with an SUV of 17.2.  Irregular soft tissue density in the right breast measuring 3.7 x 3.8 cm is favored to be secondary to the recent breast biopsy.  1.1 cm pulmonary nodule within the left upper lobe with SUV 9.7 .  Sub-6 mm pulmonary nodules are present in the right lower lobe.  No evidence of lymphadenopathy or metastatic disease in the abdomen.  Focal area of FDG uptake within the central canal posterior to T11-T12 displaced demonstrating an SUV of 5.5 thought to be reactive however MRI is recommended for further evaluation.    She was seen by Dr. Molina who initially planned for breast surgery however  due to the PET abnormalities she has been referred to Dr. Kerr who plans to do a wound on 4/30/2021.  Dr. Molina's and Dr. Kerr's notes reviewed.    Patient denies any family history of breast cancer.  Her mother had lymphoma.  Maternal grandmother had colon cancer.  Prior to that screening mammogram she did not have any palpable abnormalities of either breast.    She has been a heavy smoker for about 40 years and smoked 2 packs/day.  Denies any recent weight changes, new bone pains, cough, abdominal pain nausea vomiting constipation or diarrhea.  She does have anxiety and has been on several medications.  She has recently been started on Xanax to help with the mood and also with insomnia.    Patient had a video-assisted thoracoscopy and mediastinal lymphadenectomy on 4/30/2021.  L5-L6 lymph nodes were biopsied however the small pulmonary nodule in the left upper lobe was not difficult to find.  Pathology showed moderately differentiated adenocarcinoma which is CK7 and TTF-1 positive.  Consistent with lung primary.  Ki-67 85%.    5/14/2021-MRI of the brain-minimal chronic small vessel ischemic change in the white matter.  Otherwise negative MRI.    5/20/2021-bilateral axillary ultrasound-no evidence of axillary lymphadenopathy in either axilla.  Normal-appearing bilateral axillary lymph  nodes visualized.    Cycle 1 cisplatin and Alimta on 5/25/2021.    Cycle 2 cisplatin Alimta 6/15/2021    Cycle 3 cisplatin Alimta 7/7/2021    Completed radiation on 7/12/2021    Port was nonfunctional hence a port study was performed which showed that the port was backed up into the innominate vein and also there was a nonocclusive thrombus at the end of the catheter extending into the superior vena cava.  She was started on anticoagulation with Eliquis.  It was recommended for port revision of the intention is to keep the port.    PET/CT 8/5/2021-images independently reviewed and interpreted by me-decrease in size and FDG uptake of the left upper lobe pulmonary nodule as well as mediastinal and left hilar lymphadenopathy representing response to treatment.  Sub-6 mm pulmonary nodule in the left upper lobe new since 4/14/2021.  This could be related to radiation however follow-up CT in 3 months recommended.  Decrease in size of the irregular masslike tissue in the right breast which is postbiopsy hematoma.  This is not PET avid.  New segmental left eighth rib fractures healing.  A new band of sclerosis of the left seventh rib which favored to represent healing nondisplaced fracture.  Follow-up CT recommended.  focal uptake in the duodenum first and second portions.  Could be related to duodenitis.  Indeterminate lesion in the L1 vertebral body not well evaluated on PET/CT.    10/1/2021-MRI of the lumbar spine.  Lesion in the left posterior body of L1 measures 14 x 14 x 12 mm and previously 5 x 5 x 6 mm.  The interval enlargement strongly suggest that it is metastatic lesion.  No additional osseous metastasis noted.   Other chronic changes noted.    10/14/2021-biopsy of the lumbar spine lesion-pathology consistent with poorly differentiated carcinoma.  The staining is similar to the prior tumors and fevers this being metastatic poorly differentiated pulmonary adenocarcinoma.    10/26/2021-brain MRI-no evidence of  metastatic disease.    10/26/2021-bilateral diagnostic mammogram and ultrasound  Scattered fibroglandular density in both breast.  Postbiopsy hematoma with internal biopsy clip in the upper outer quadrant of the right breast, 8 cm from the nipple.  The hematoma size is decreased to 2.9 cm from 3.6 cm earlier.    Left breast-parenchymal density measuring 9 x 10 mm has markedly decreased.  Few adjacent calcifications which are unchanged.  No new abnormality in the left breast.  At 10:00 region there is some minimal adjacent hypoechoic texture which is difficult to measure but appears smaller since the previous exam.    10/28/2021-PET/CT  New L1 and L2 lytic bone metastasis annually intensely hypermetabolic focus at the left adrenal gland likely represents metastatic disease as well.  Slight decrease in size of the 0.9 x 0.7 cm left upper lobe pulmonary nodule.  There is hypermetabolic activity related to radiation pneumonitis.  The remainder of the nodule is photopenic.  Uncertain etiology of the more intense activity along the right lateral peripheral margin of the 2.6 cm postbiopsy density of the right breast.    She completed radiation to to the lumbar spine on 11/19/2021 11/22/2021-cycle 1 Alimta and Keytruda    3/23/2022-bilateral diagnostic mammogram and ultrasound  Complete resolution of the microcalcifications in the upper outer quadrant of the right breast and decrease in size of the postbiopsy hematoma.  No suspicious abnormalities in the right breast.  Benign-appearing calcifications at the site of malignancy in the left breast upper inner quadrant.  No other suspicious findings.    4/11/2022 PET/CT-there is new groundglass opacities in the left upper lobe which are most likely postradiation changes.  Other groundglass opacities in the left lung as well as the right lung remained stable.  Increased nodularity of the left adrenal gland with an SUV of 5.6, previously 3.5.    Increased uptake in the L1  vertebra in the right posterior aspect with an SUV of 3.6.  Left L1 sclerosis increased    Interval history:  Holli Patel is a 56-year-old female with the above-mentioned history returns to the office today for scheduled Keytruda and Alimta.  She had a PET/CT 4/11/2022 with findings of possible disease progression.  Due to this the case was discussed in multidisciplinary conference.  The disease progression was significant in the left adrenal gland as well as the L1 vertebra.  Since there were only 2 areas of disease progression it has been decided to proceed with radiation to these 2 spots and continue on Keytruda and Alimta.  She presents today to discuss the recommendations from the multidisciplinary conference and also for treatment.  The periorbital edema has improved however she is having significant fluid retention in her fingers.  She had to have her rings cut off at the emergency room.  Blood pressure is on the low side today.  Denies any lightheadedness dizziness or chest pain.  No dyspnea or cough.  She has lost about 4 pounds since her last visit.  Reports a good appetite no nausea or vomiting.  She has been constipated.      The following portions of the patient's history were reviewed and updated as appropriate: allergies, current medications, past family history, past medical history, past social history, past surgical history and problem list.    Past Medical History:   Diagnosis Date   • Anxiety    • Clot     POSSIBLE BLOOD CLOT IN VENOUS ACCESS DEVICE-PT STATES WAS STARTED ON ELIQUIS    • Dyspnea on exertion    • Elevated cholesterol    • Frequent urination     DAY AND NIGHT   • SHAYY (generalized anxiety disorder)     RELATED HIGH BLOOD PRESSURE   • GERD (gastroesophageal reflux disease)    • High blood pressure     ANXIETY RELATED   • Hyperlipidemia    • Hypothyroidism    • Lung cancer (HCC)    • Lung nodule     LEFT   • Malignant neoplasm of upper-outer quadrant of right breast in female,  estrogen receptor positive (HCC) 2021    PT STATES HAS BILAT BREAST CANCER   • Migraine    • PONV (postoperative nausea and vomiting)         Past Surgical History:   Procedure Laterality Date   • BREAST BIOPSY Bilateral 2021    Right Breast 10 o'clock & Left breast 10 o'clock 6 cm from nipple, [facility]   • CLOSED REDUCTION HIP DISLOCATION Left 2021    Procedure: BRONCHOSCOPY, LEFT VIDEO ASSITED THORACOSCOPY, ROBOTIC ASSSITED MEDIASTINAL LYMPHADENECTOMY WITH INTERCOSTAL NERVE BLOCKS;  Surgeon: Raphael Kerr III, MD;  Location: [facility];  Service: DaVinci;  Laterality: Left;   • COLONOSCOPY     • TUBAL ABDOMINAL LIGATION     • VENOUS ACCESS DEVICE (PORT) INSERTION Right 2021    Procedure: INSERTION VENOUS ACCESS DEVICE;  Surgeon: Raphael Kerr III, MD;  Location: [facility] OR;  Service: Thoracic;  Laterality: Right;   • VENOUS ACCESS DEVICE (PORT) INSERTION Right 2021    Procedure: REVISION AND REPLACEMENT RIGHT SUBCLAVIAN VENOUS ACCESS PORT;  Surgeon: Raphael Kerr III, MD;  Location: [facility] OR;  Service: Thoracic;  Laterality: Right;   • WRIST SURGERY Right         Family History   Problem Relation Age of Onset   • Cancer Father         LYMPHATIC   • Prostate cancer Father    • Lymphoma Father    • Alcohol abuse Brother    • Colon cancer Maternal Grandmother    • Malig Hyperthermia Neg Hx         Social History     Socioeconomic History   • Marital status:      Spouse name: Jelani   Tobacco Use   • Smoking status: Former Smoker     Packs/day: 1.00     Years: 30.00     Pack years: 30.00     Types: Electronic Cigarette, Cigarettes     Start date: 1981     Quit date:      Years since quittin.3   • Smokeless tobacco: Never Used   • Tobacco comment: ABT 15 CIGARETTES DAILY   Vaping Use   • Vaping Use: Some days   • Substances: Nicotine, Flavoring   • Devices: Disposable   Substance and Sexual Activity   • Alcohol use: Never   • Drug use: Never   • Sexual  "activity: Yes     Partners: Male        OB History        2    Para   2    Term   2            AB        Living           SAB        IAB        Ectopic        Molar        Multiple        Live Births                Age of menarche-10  Age at menopause-42  Oral contraceptive pill use-15 years  No HRT use  She has 2 children  Age at first live childbirth-17    No Known Allergies     Review of Systems   Review of systems as mentioned in the HPI    Objective   Blood pressure 92/64, pulse 65, temperature 98 °F (36.7 °C), temperature source Temporal, resp. rate 16, height 160 cm (62.99\"), weight 74.4 kg (164 lb), SpO2 96 %, not currently breastfeeding.       Physical Exam  Vitals reviewed.   HENT:      Head: Normocephalic.   Eyes:      Pupils: Pupils are equal, round, and reactive to light.   Cardiovascular:      Rate and Rhythm: Normal rate and regular rhythm.      Pulses: Normal pulses.      Heart sounds: Normal heart sounds.   Pulmonary:      Effort: Pulmonary effort is normal.      Breath sounds: Normal breath sounds.   Abdominal:      General: Abdomen is flat.   Musculoskeletal:         General: No swelling. Normal range of motion.      Cervical back: Normal range of motion.   Skin:     General: Skin is warm.   Neurological:      General: No focal deficit present.      Mental Status: She is alert and oriented to person, place, and time.   Psychiatric:         Mood and Affect: Mood normal.         Behavior: Behavior normal.         I have reexamined the patient and the results are consistent with the previously documented exam. Salma Snell MD        Results from last 7 days   Lab Units 05/10/22  0829   WBC 10*3/mm3 10.57   NEUTROS ABS 10*3/mm3 7.06*   HEMOGLOBIN g/dL 9.1*   HEMATOCRIT % 30.9*   PLATELETS 10*3/mm3 365   CBC reviewed and hemoglobin stable at 9.1, WBC and ANC within normal limits.            NM PET/CT Skull Base to Mid Thigh    Result Date: 2022  1. The new hypermetabolic left " upper lobe ground-glass opacities in the more prominent metabolic activity along the bandlike scarring surrounding the stable 7 mm residual nodule are suspected to be related to radiation. There are similar but less prominent ground-glass opacities at the posterior aspect of the right upper lobe and the superior aspect of both lower lobes which may be within the radiation field as well. The activity at the proximal esophagus is likely related as well. The photopenic 7 mm left upper lobe nodule is stable and there is no evidence for metastatic lymphadenopathy within the chest. 2. The left adrenal gland appears more nodular than previously and the adrenal body is significantly more hypermetabolic. This in conjunction with the new hypermetabolic focus at the right aspect of the L1 vertebral body, where there is a treated metastasis at the left aspect of the vertebral body, are suspicious for metastatic disease. The new tiny focus of hypermetabolic activity at the right iliac body is indeterminate given the tiny size, but is worrisome for a metastasis.  This report was finalized on 4/12/2022 11:18 AM by Dr. Chaya Lee M.D.      1/24/2022 CBC reviewed and stable WBC of 7.5, hemoglobin 9.6 and platelets 354,000  CMP reviewed and potassium 3.3 but otherwise within normal limits.    PET/CT 4/11/2022-images independently reviewed and interpreted by me.  There is new groundglass opacities in the left upper lobe which are most likely postradiation changes.  Other groundglass opacities in the left lung as well as the right lung remained stable.  Increased nodularity of the left adrenal gland with an SUV of 5.6, previously 3.5.    Increased uptake in the L1 vertebra in the right posterior aspect with an SUV of 3.6.  Left L1 sclerosis increased.    Assessment/Plan      *Bilateral breast cancer  · Invasive ductal carcinoma in both right and left breast and lesions measure under 1 cm.  No palpable lymphadenopathy although unsure  if this has been evaluated by an axillary ultrasound.  No abnormal axillary lymphadenopathy noted on PET.  · Most likely clinical T1b N0 M0, both cancers were noted to be grade 2, ER/VA positive and HER-2 negative and Ki-67 less than 15%  VATS on 4/30/2021  Biopsy confirms adenocarcinoma of pulmonary primary  Discussed with Dr. Molina about delaying breast surgery and she is in agreement  Darted on neoadjuvant anastrozole  · Patient experienced significant adverse effects with anastrozole  · Treatment changed to letrozole   · Axillary ultrasound 4/20/2021 -negative for any axillary lymphadenopathy  · Started letrozole in May 2021  · She continues on letrozole without any problems.  · 8/24/2021 bilateral diagnostic mammogram and ultrasound.  In the left breast the tumor now measures 1 cm on mammogram as opposed to 0.8 cm in March.  Right breast calcifications also noted to be in a large extent although the number seems to be decreased.  Hematoma has decreased in size.  · 10/26/2021-bilateral diagnostic mammogram and ultrasound-decrease in size of the bilateral breast lesions.  · 10/26/2021-PET/CT-metastatic lesions in the left adrenal gland, L1-L2.  · 10/26/2021-MRI of the brain-no evidence of metastatic disease  · 3/23/2022-bilateral diagnostic mammogram and ultrasound show complete resolution of the tumors.  · Continue letrozole, good response to treatment.    *Adenocarcinoma of the left lung, metastatic   · Most likely stage T1 N2 M0, stage III  · MRI of the thoracic spine ordered to further evaluate the abnormality seen on PET  · MRI of the brain negative   · Given that she is only 55 and fairly good performance status I discussed with her by with concurrent chemoradiation with cisplatin and Alimta every 3 weeks followed by durvalumab.  · Adverse effects including but not limited to myelosuppression, increased risk of infections, nausea, vomiting, renal dysfunction, ototoxicity, neurotoxicity have been discussed  at length.  · MRI of the thoracic spine performed 5/24/2021 shows no evidence of metastatic disease in the thoracic spine however in L1 there is a 7 mm lesion which is indeterminate.  A lumbar spine MRI has been recommended.  · Discussed her case with Dr. Oates and he feels like MRI of the lumbar spine may also show that this lesion is indeterminate.  · Discussed the same with the patient and her .  · Even if this is metastatic disease this might be the only lesion of metastatic disease and then would consider this oligometastatic disease.  · Therefore plan to proceed with concurrent chemoradiation as scheduled.  · cisplatin and Alimta, C1D1 5/25/2021  · Day 1 of radiation 5/25/2021  · 5/27/2021-MRI of the lumbar spine-there is a 6 x 5 x 6 mm enhancing lesion in the left posterior body of L1 vertebra.  This is considered indeterminate.  Follow-up recommended.  Degenerative changes noted at L5-S1  · 7/7/2021, cycle 3 cisplatin Alimta  · 7/12/2021-radiation completed  · PET/CT 8/6/2021 with good response to chemoradiation.  · MRI of the lumbar spine 10/1/2021 with increase in size of the L1 lesion which now measures 14 mm as opposed to 6 mm in May 2021.  · 10/14/2021-biopsy of the L1 lesion and pathology consistent with adenocarcinoma of the lung, TPS 0  · 0/26/2021-PET/CT-metastatic lesions in the left adrenal gland, L1-L2.  · 10/26/2021-MRI of the brain-no evidence of metastatic disease  · Given that she only has metastatic disease in L1-L2 and left adrenal gland I think it would be possible to radiate all these regions.  · Completed radiation to L1-L2 on 11/19/2021  · 11/22/2021-cycle 1 of Keytruda and Alimta  · Completed radiation to the left adrenal gland  · 1/18/2022-PET/CT-images independently reviewed and interpreted by me.  Decrease in size of the 7 mm pulmonary nodule in the left upper lobe which previously measured 9 mm.  Radiation pneumonitis is nearly resolved.  Hypermetabolic activity at the L1-L2  metastasis has resolved.  No new suspicious metastatic disease.  · Guardant 360 circulating DNA level decreasing.  · 3/7/2022-patient is complaining of fluid retention.  · Thought to be secondary to Alimta and started on steroids and Lasix.  · Developed CHASITY on Lasix since Lasix discontinued.  · Alimta dose has been reduced.   · PET/CT 4/11/2022 concerning for disease progression in the left adrenal gland as well as L1 vertebra on the left.  · Case presented and discussed at the thoracic multidisciplinary conference.  The plan is to proceed with Keytruda and Alimta and radiation to the left adrenal gland and the L1 vertebra.  · She will be referred back to radiation oncology    *Fluid retention  · Secondary to Alimta  · She was previously treated with Lasix but this was discontinued due to CHASITY.    *CHASITY  · Most likely secondary to Lasix  · Discontinue Lasix  · 1 L normal saline 3/28/2022  · Kidney function improved, Creatinine 1.69 and BUN 23.  Encourage patient to increase oral intake.  Discouraged intake of caffeinated beverages.  Patient to return in 1 week for labs only around the time of her schedule PET scan appointment.  · 4/19/2022-creatinine 1.36.  · 5/10/2022-creatinine elevated at 1.96.  · Administer 1 L of normal saline today.    *Anemia  · Hemoglobin 7.9   · Symptomatic  · 2 units of PRBC  · Hgb improved to 9.8 today   · 4/11/2022 hemoglobin stable at 8.8    *Bilateral breast cancer-no family history of breast cancer but given synchronous breast cancer genetic testing has been sent.  Genetic testing with a variant of unknown significance.    *Right hand numbness and difficulty with grasping objects  · Previously had history of carpal tunnel requiring repair.  · She is currently on letrozole which could cause worsening of the carpal tunnel  · Gabapentin dose will be increased to 600 mg 3 times daily  · She will also be referred to hand surgery for further evaluation.  · She now reports bilateral upper  extremity numbness.  · Continue gabapentin  · MRI of the brain 10/26/2021 without any evidence of metastatic disease  · Not an issue today    *Anxiety-  · Currently on Xanax 1 mg nightly  · She required Valium for the MRI as she had claustrophobia and a panic attack  · Continue Zyprexa.  · Unchanged    *Depression  · Continue Zyprexa  · Related to cancer diagnosis and current situation where she is having to care for her elderly mother.  · Unchanged    *Left-sided chest wall pain-secondary to VATS.  Most likely neuropathic.  Continue gabapentin and Norco as needed.  She ran out of gabapentin and experienced leg cramps.    Continue gabapentin  Improved      *Hypertension-continue lisinopril.  Blood pressure low at 92/64.  She is asymptomatic      *Smoking-not addressed today    *GERD  · 7/7/2021, patient complains of reflux despite the use of Prilosec 20 mg twice daily.  We will send her a prescription for Carafate slurry 4 times daily.  · She has not quite started using the Carafate.  · Continue Prilosec and Carafate  · PET/CT 4/11/2022 shows increased uptake in the proximal esophagus.  Likely secondary to GERD.  · Continue current medication    *Port not returning blood    · A new Mediport was placed by thoracic surgery 11/29/2021, Eliquis has been discontinued and right chest Mediport is now safe to use.      PLAN:  1. Proceed with Alimta and Keytruda today  2. Radiation to the left adrenal gland and L1 vertebral body  3. Continue PPI   4. Continue Zyprexa for anxiety and depression  5. 1 L normal saline today for elevated creatinine  6. Follow-up in 3 weeks for next cycle of Alimta and Keytruda.  A PET/CT will be obtained on 6/7/2022     She has progression of metastatic breast cancer, images have been reviewed at multidisciplinary conference and discussed in the multidisciplinary conference.  She is on treatment requiring close monitoring for toxicities.     Salma Snell MD

## 2022-05-16 DIAGNOSIS — F41.9 ANXIETY: ICD-10-CM

## 2022-05-19 RX ORDER — OLANZAPINE 5 MG/1
TABLET ORAL
Qty: 30 TABLET | Refills: 1 | Status: SHIPPED | OUTPATIENT
Start: 2022-05-19 | End: 2022-07-18

## 2022-05-19 RX ORDER — ALPRAZOLAM 0.5 MG/1
0.5 TABLET ORAL 2 TIMES DAILY PRN
Qty: 60 TABLET | Refills: 0 | Status: SHIPPED | OUTPATIENT
Start: 2022-05-19 | End: 2022-05-25 | Stop reason: SDUPTHER

## 2022-05-25 ENCOUNTER — OFFICE VISIT (OUTPATIENT)
Dept: FAMILY MEDICINE CLINIC | Facility: CLINIC | Age: 57
End: 2022-05-25

## 2022-05-25 VITALS
DIASTOLIC BLOOD PRESSURE: 60 MMHG | HEIGHT: 63 IN | OXYGEN SATURATION: 98 % | TEMPERATURE: 97.8 F | BODY MASS INDEX: 29.06 KG/M2 | SYSTOLIC BLOOD PRESSURE: 90 MMHG | WEIGHT: 164 LBS | HEART RATE: 82 BPM

## 2022-05-25 DIAGNOSIS — E03.8 OTHER SPECIFIED HYPOTHYROIDISM: ICD-10-CM

## 2022-05-25 DIAGNOSIS — C79.51 NON-SMALL CELL LUNG CANCER METASTATIC TO BONE: ICD-10-CM

## 2022-05-25 DIAGNOSIS — M19.90 ARTHRITIS: ICD-10-CM

## 2022-05-25 DIAGNOSIS — I10 ESSENTIAL HYPERTENSION: ICD-10-CM

## 2022-05-25 DIAGNOSIS — N18.31 STAGE 3A CHRONIC KIDNEY DISEASE: ICD-10-CM

## 2022-05-25 DIAGNOSIS — F41.9 ANXIETY: Primary | ICD-10-CM

## 2022-05-25 DIAGNOSIS — C34.90 NON-SMALL CELL LUNG CANCER METASTATIC TO BONE: ICD-10-CM

## 2022-05-25 DIAGNOSIS — E78.2 MIXED HYPERLIPIDEMIA: ICD-10-CM

## 2022-05-25 PROCEDURE — 99214 OFFICE O/P EST MOD 30 MIN: CPT | Performed by: FAMILY MEDICINE

## 2022-05-25 RX ORDER — ALPRAZOLAM 0.5 MG/1
0.5 TABLET ORAL 2 TIMES DAILY PRN
Qty: 60 TABLET | Refills: 2 | Status: ON HOLD | OUTPATIENT
Start: 2022-05-25 | End: 2022-08-08 | Stop reason: SDUPTHER

## 2022-05-25 RX ORDER — LEVOTHYROXINE SODIUM 0.03 MG/1
25 TABLET ORAL DAILY
Qty: 90 TABLET | Refills: 1 | Status: SHIPPED | OUTPATIENT
Start: 2022-05-25 | End: 2023-01-25 | Stop reason: SDUPTHER

## 2022-05-25 RX ORDER — IBUPROFEN 800 MG/1
800 TABLET ORAL EVERY 6 HOURS PRN
Qty: 270 TABLET | Refills: 1 | Status: CANCELLED | OUTPATIENT
Start: 2022-05-25

## 2022-05-25 RX ORDER — BUPROPION HYDROCHLORIDE 300 MG/1
300 TABLET ORAL DAILY
Qty: 30 TABLET | Refills: 5 | Status: SHIPPED | OUTPATIENT
Start: 2022-05-25 | End: 2022-08-09 | Stop reason: HOSPADM

## 2022-05-25 RX ORDER — RIZATRIPTAN BENZOATE 10 MG/1
10 TABLET ORAL ONCE
Qty: 12 TABLET | Refills: 5 | Status: SHIPPED | OUTPATIENT
Start: 2022-05-25 | End: 2023-01-11 | Stop reason: SDUPTHER

## 2022-05-25 RX ORDER — OMEPRAZOLE 20 MG/1
20 CAPSULE, DELAYED RELEASE ORAL 2 TIMES DAILY
Qty: 180 CAPSULE | Refills: 1 | Status: SHIPPED | OUTPATIENT
Start: 2022-05-25 | End: 2023-01-24 | Stop reason: SDUPTHER

## 2022-05-25 RX ORDER — SIMVASTATIN 20 MG
20 TABLET ORAL DAILY
Qty: 90 TABLET | Refills: 1 | Status: SHIPPED | OUTPATIENT
Start: 2022-05-25 | End: 2022-12-02

## 2022-05-25 RX ORDER — LISINOPRIL 20 MG/1
20 TABLET ORAL DAILY
Qty: 30 TABLET | Refills: 5 | Status: SHIPPED | OUTPATIENT
Start: 2022-05-25 | End: 2022-06-28

## 2022-05-25 RX ORDER — PROPRANOLOL HCL 60 MG
60 CAPSULE, EXTENDED RELEASE 24HR ORAL DAILY
Qty: 90 CAPSULE | Refills: 1 | Status: SHIPPED | OUTPATIENT
Start: 2022-05-25 | End: 2022-08-30

## 2022-05-25 RX ORDER — LIDOCAINE 50 MG/G
1 PATCH TOPICAL EVERY 24 HOURS
Qty: 60 PATCH | Refills: 5 | Status: SHIPPED | OUTPATIENT
Start: 2022-05-25 | End: 2022-06-28

## 2022-05-25 NOTE — PATIENT INSTRUCTIONS
Needs to avoid all NSAIDs     Stop ibuprofen    Decrease lisinopril to 10 mg daily, may take 1/2 tablet daily    Get lab work as discussed

## 2022-05-25 NOTE — PROGRESS NOTES
Chief Complaint  Anxiety (MEDICATION REFILLS), Hypothyroidism, Migraine, and Hyperlipidemia     3-month follow-up on chronic SHAYY  And  6-month follow-up on hyperlipidemia, migraines, hypothyroidism, OA, and new Dx of progressive CKD    LOV with me in February for chronic med refills and uncontrolled SHAYY.  -- Wellbutrin was restarted due to uncontrolled SHAYY, much situational/cancer treatment and family stressors.    Overall, doing okay.  Same stressors at home and intensive cancer treatment.  Still undergoing intensive chemo treatments (under the care of oncologist/Dr. Snell) for lung cancer and breast cancer.  Although, she is sleeping better since Zyprexa was added by her oncologist.  Weight stable.  Appetite fine.  Mood stable.    Only complaint today is --more low back pain, especially since having to stop her ibuprofen!  History of metastatic lung cancer to bone, vertebrae.  Intolerant to narcotics/pain medications in past, gets severe nausea.  Tylenol is just not helping much.    Otherwise, just needs several chronic med refills  Compliant with and tolerates all medications without side effects.  However, she was just called back by her oncologist approximately 2 weeks ago and told to stop her ibuprofen due to progressive renal decline.  Eliquis was discontinued approximately 5 to 6 months ago.  Also, Lasix was discontinued.  Still takes her Prozac, Wellbutrin, and Xanax twice daily for chronic SHAYY.  And, recently low-dose Zyprexa was added for sleep.    Review of Systems   Constitutional: Negative for fatigue, fever and unexpected weight change.   Respiratory: Negative for cough and shortness of breath.    Cardiovascular: Negative for chest pain.        Subjective          Holli Patel presents to [facility]    Objective   Vital Signs:   Vitals:    05/25/22 1046   BP: 90/60   BP Location: Left arm   Patient Position: Sitting   Cuff Size: Adult   Pulse: 82   Temp: 97.8 °F  "(36.6 °C)   SpO2: 98%   Weight: 74.4 kg (164 lb)   Height: 160 cm (63\")      Physical Exam  Vitals and nursing note reviewed.   Constitutional:       Appearance: Normal appearance. She is well-developed.   HENT:      Head: Normocephalic and atraumatic.      Nose: Nose normal.   Eyes:      Conjunctiva/sclera: Conjunctivae normal.      Pupils: Pupils are equal, round, and reactive to light.   Neck:      Thyroid: No thyromegaly.   Cardiovascular:      Rate and Rhythm: Normal rate and regular rhythm.      Heart sounds: Normal heart sounds. No murmur heard.  Pulmonary:      Effort: Pulmonary effort is normal.      Breath sounds: Normal breath sounds.   Abdominal:      General: Abdomen is flat. Bowel sounds are normal. There is no distension.      Palpations: Abdomen is soft. There is no hepatomegaly, splenomegaly or mass.      Tenderness: There is no abdominal tenderness. There is no guarding or rebound.      Hernia: No hernia is present.   Musculoskeletal:         General: Normal range of motion.      Cervical back: Normal range of motion and neck supple.      Right lower leg: No edema.      Left lower leg: No edema.   Lymphadenopathy:      Cervical: No cervical adenopathy.   Skin:     General: Skin is warm.   Neurological:      General: No focal deficit present.      Mental Status: She is alert.   Psychiatric:         Mood and Affect: Mood normal.         Behavior: Behavior normal.         Thought Content: Thought content normal.         Judgment: Judgment normal.        Result Review :     Common labs    Common Labsle 4/11/22 4/11/22 4/19/22 4/19/22 5/10/22 5/10/22    0803 0803 0830 0830 0829 0829   Glucose  103  97  91   BUN  17  13  28 (A)   Creatinine  1.40 (A)  1.36 (A)  1.92 (A)   Sodium  140  143  137   Potassium  3.7  3.9  4.7   Chloride  105  105  103   Calcium  8.7  9.5  9.9   Albumin    3.70  3.90   Total Bilirubin    <0.2 (A)  <0.2 (A)   Alkaline Phosphatase    124 (A)  122 (A)   AST (SGOT)    18  20   ALT " (SGPT)    11  12   WBC 3.91  9.50  10.57    Hemoglobin 8.2 (A)  8.8 (A)  9.1 (A)    Hematocrit 25.4 (A)  28.2 (A)  30.9 (A)    Platelets 151  366  365    (A) Abnormal value            TSH    TSH 2/14/22 3/28/22 5/10/22   TSH 1.340 2.250 3.220               November 2021 --- , HDL 59      Lab Results   Component Value Date    FERRITIN 320.00 (H) 04/01/2022    FOLATE >20.00 04/01/2022               Assessment and Plan    Diagnoses and all orders for this visit:    1. Anxiety (Primary)  -   Urine Tox screen and Star report both up-to-date and appropriate.  No change of medication.  Continue Wellbutrin and Prozac as prescribed.  Will refill upon request  Continue/refill Xanax as directed below.  Continue low-dose Zyprexa for sleep, per oncologist.  -     ALPRAZolam (XANAX) 0.5 MG tablet; Take 1 tablet by mouth 2 (Two) Times a Day As Needed for Anxiety.  Dispense: 60 tablet; Refill: 2    2. Mixed hyperlipidemia  -controlled  Order placed to recheck lipids, plans to have routine lab work done in approximately 1 to 2 weeks.  Goal LDL needs to remain less than 130 given history of HTN.  If at goal plan to continue Zocor 20 mg daily  -     Lipid Panel With LDL / HDL Ratio; Future    3. Other specified hypothyroidism  -controlled  TSH up-to-date and therapeutic.  Continue/refill Synthroid 25 mcg 1 p.o. every morning    4. Essential hypertension  - too well-controlled??  May decrease Prinivil to 10 mg daily  Refill as directed below, may take one half daily.  Continue to monitor ambulatory blood pressures, this is done routinely with her chemo treatments.    5. Stage 3a chronic kidney disease (HCC)  --progressive decline.  Recently stopped high-dose ibuprofen 800 mg 3 times daily approximately 2 weeks ago.  Needs remain off all NSAIDs.  Plans to have CBC and CMP rechecked by oncologist in the next 1 to 2 weeks.    6. Arthritis  -uncontrolled.  Can no longer take NSAIDs due to CKD decline.  May take Tylenol.   Intolerant to narcotics/nausea etc.  May start Lidoderm patch as directed below.    7. 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)  -uncontrolled, low back pain  See above plan.    Migraines --controlled.  Refill Inderal and Maxalt as directed below    Other orders  -     buPROPion XL (WELLBUTRIN XL) 300 MG 24 hr tablet; Take 1 tablet by mouth Daily.  Dispense: 30 tablet; Refill: 5  -     levothyroxine (SYNTHROID, LEVOTHROID) 25 MCG tablet; Take 1 tablet by mouth Daily.  Dispense: 90 tablet; Refill: 1  -     lisinopril (PRINIVIL,ZESTRIL) 20 MG tablet; Take 1 tablet by mouth Daily.  Dispense: 30 tablet; Refill: 5  -     omeprazole (PrilOSEC) 20 MG capsule; Take 1 capsule by mouth 2 (Two) Times a Day.  Dispense: 180 capsule; Refill: 1  -     propranolol LA (Inderal LA) 60 MG 24 hr capsule; Take 1 capsule by mouth Daily.  Dispense: 90 capsule; Refill: 1  -     rizatriptan (MAXALT) 10 MG tablet; Take 1 tablet by mouth 1 (One) Time for 1 dose. AT ONSET OF HEADACHE MAY REPEAT ONE TABLET IN 2 HOURS IF NEEDED  Dispense: 12 tablet; Refill: 5  -     simvastatin (ZOCOR) 20 MG tablet; Take 1 tablet by mouth Daily.  Dispense: 90 tablet; Refill: 1  -     lidocaine (LIDODERM) 5 %; Place 1 patch on the skin as directed by provider Daily. Remove & Discard patch within 12 hours or as directed by MD  Dispense: 60 patch; Refill: 5        Follow Up   Return in about 3 months (around 8/25/2022) for Recheck.  Patient was given instructions and counseling regarding her condition or for health maintenance advice. Please see specific information pulled into the AVS if appropriate.

## 2022-05-27 ENCOUNTER — PRIOR AUTHORIZATION (OUTPATIENT)
Dept: FAMILY MEDICINE CLINIC | Facility: CLINIC | Age: 57
End: 2022-05-27

## 2022-05-27 NOTE — TELEPHONE ENCOUNTER
PA was sent to cover my meds for Lidocaine patches waiting on insurance to respond with determination

## 2022-05-31 ENCOUNTER — INFUSION (OUTPATIENT)
Dept: ONCOLOGY | Facility: HOSPITAL | Age: 57
End: 2022-05-31

## 2022-05-31 ENCOUNTER — OFFICE VISIT (OUTPATIENT)
Dept: ONCOLOGY | Facility: CLINIC | Age: 57
End: 2022-05-31

## 2022-05-31 VITALS
BODY MASS INDEX: 29.38 KG/M2 | WEIGHT: 165.8 LBS | OXYGEN SATURATION: 96 % | TEMPERATURE: 96.8 F | DIASTOLIC BLOOD PRESSURE: 68 MMHG | HEART RATE: 77 BPM | SYSTOLIC BLOOD PRESSURE: 100 MMHG | RESPIRATION RATE: 16 BRPM | HEIGHT: 63 IN

## 2022-05-31 DIAGNOSIS — C34.12 PRIMARY ADENOCARCINOMA OF UPPER LOBE OF LEFT LUNG: ICD-10-CM

## 2022-05-31 DIAGNOSIS — C34.12 PRIMARY ADENOCARCINOMA OF UPPER LOBE OF LEFT LUNG: Primary | ICD-10-CM

## 2022-05-31 DIAGNOSIS — Z45.2 ENCOUNTER FOR FITTING AND ADJUSTMENT OF VASCULAR CATHETER: Primary | ICD-10-CM

## 2022-05-31 DIAGNOSIS — R79.89 ELEVATED SERUM CREATININE: ICD-10-CM

## 2022-05-31 LAB
ALBUMIN SERPL-MCNC: 3.5 G/DL (ref 3.5–5.2)
ALBUMIN/GLOB SERPL: 0.9 G/DL (ref 1.1–2.4)
ALP SERPL-CCNC: 117 U/L (ref 38–116)
ALT SERPL W P-5'-P-CCNC: 11 U/L (ref 0–33)
ANION GAP SERPL CALCULATED.3IONS-SCNC: 10.1 MMOL/L (ref 5–15)
AST SERPL-CCNC: 17 U/L (ref 0–32)
BASOPHILS # BLD AUTO: 0.09 10*3/MM3 (ref 0–0.2)
BASOPHILS NFR BLD AUTO: 0.9 % (ref 0–1.5)
BILIRUB SERPL-MCNC: <0.2 MG/DL (ref 0.2–1.2)
BUN SERPL-MCNC: 23 MG/DL (ref 6–20)
BUN/CREAT SERPL: 11.4 (ref 7.3–30)
CALCIUM SPEC-SCNC: 9.1 MG/DL (ref 8.5–10.2)
CHLORIDE SERPL-SCNC: 106 MMOL/L (ref 98–107)
CO2 SERPL-SCNC: 23.9 MMOL/L (ref 22–29)
CREAT SERPL-MCNC: 2.01 MG/DL (ref 0.6–1.1)
DEPRECATED RDW RBC AUTO: 72.2 FL (ref 37–54)
EGFRCR SERPLBLD CKD-EPI 2021: 28.3 ML/MIN/1.73
EOSINOPHIL # BLD AUTO: 0.58 10*3/MM3 (ref 0–0.4)
EOSINOPHIL NFR BLD AUTO: 5.7 % (ref 0.3–6.2)
ERYTHROCYTE [DISTWIDTH] IN BLOOD BY AUTOMATED COUNT: 17.8 % (ref 12.3–15.4)
GLOBULIN UR ELPH-MCNC: 3.7 GM/DL (ref 1.8–3.5)
GLUCOSE SERPL-MCNC: 90 MG/DL (ref 74–124)
HCT VFR BLD AUTO: 27.8 % (ref 34–46.6)
HGB BLD-MCNC: 8.4 G/DL (ref 12–15.9)
IMM GRANULOCYTES # BLD AUTO: 0.22 10*3/MM3 (ref 0–0.05)
IMM GRANULOCYTES NFR BLD AUTO: 2.2 % (ref 0–0.5)
LYMPHOCYTES # BLD AUTO: 1.09 10*3/MM3 (ref 0.7–3.1)
LYMPHOCYTES NFR BLD AUTO: 10.7 % (ref 19.6–45.3)
MCH RBC QN AUTO: 33.6 PG (ref 26.6–33)
MCHC RBC AUTO-ENTMCNC: 30.2 G/DL (ref 31.5–35.7)
MCV RBC AUTO: 111.2 FL (ref 79–97)
MONOCYTES # BLD AUTO: 1.34 10*3/MM3 (ref 0.1–0.9)
MONOCYTES NFR BLD AUTO: 13.2 % (ref 5–12)
NEUTROPHILS NFR BLD AUTO: 6.85 10*3/MM3 (ref 1.7–7)
NEUTROPHILS NFR BLD AUTO: 67.3 % (ref 42.7–76)
NRBC BLD AUTO-RTO: 0 /100 WBC (ref 0–0.2)
PLATELET # BLD AUTO: 337 10*3/MM3 (ref 140–450)
PMV BLD AUTO: 9 FL (ref 6–12)
POTASSIUM SERPL-SCNC: 5.2 MMOL/L (ref 3.5–4.7)
PROT SERPL-MCNC: 7.2 G/DL (ref 6.3–8)
RBC # BLD AUTO: 2.5 10*6/MM3 (ref 3.77–5.28)
SODIUM SERPL-SCNC: 140 MMOL/L (ref 134–145)
WBC NRBC COR # BLD: 10.17 10*3/MM3 (ref 3.4–10.8)

## 2022-05-31 PROCEDURE — 96360 HYDRATION IV INFUSION INIT: CPT

## 2022-05-31 PROCEDURE — 25010000002 HEPARIN LOCK FLUSH PER 10 UNITS: Performed by: INTERNAL MEDICINE

## 2022-05-31 PROCEDURE — 80053 COMPREHEN METABOLIC PANEL: CPT

## 2022-05-31 PROCEDURE — 99214 OFFICE O/P EST MOD 30 MIN: CPT | Performed by: INTERNAL MEDICINE

## 2022-05-31 PROCEDURE — 85025 COMPLETE CBC W/AUTO DIFF WBC: CPT

## 2022-05-31 RX ORDER — SODIUM CHLORIDE 0.9 % (FLUSH) 0.9 %
10 SYRINGE (ML) INJECTION AS NEEDED
Status: CANCELLED | OUTPATIENT
Start: 2022-05-31

## 2022-05-31 RX ORDER — SODIUM CHLORIDE 0.9 % (FLUSH) 0.9 %
10 SYRINGE (ML) INJECTION AS NEEDED
Status: DISCONTINUED | OUTPATIENT
Start: 2022-05-31 | End: 2022-05-31 | Stop reason: HOSPADM

## 2022-05-31 RX ORDER — SODIUM CHLORIDE 9 MG/ML
1000 INJECTION, SOLUTION INTRAVENOUS ONCE
Status: COMPLETED | OUTPATIENT
Start: 2022-05-31 | End: 2022-05-31

## 2022-05-31 RX ORDER — HEPARIN SODIUM (PORCINE) LOCK FLUSH IV SOLN 100 UNIT/ML 100 UNIT/ML
500 SOLUTION INTRAVENOUS AS NEEDED
Status: CANCELLED | OUTPATIENT
Start: 2022-05-31

## 2022-05-31 RX ORDER — HEPARIN SODIUM (PORCINE) LOCK FLUSH IV SOLN 100 UNIT/ML 100 UNIT/ML
500 SOLUTION INTRAVENOUS AS NEEDED
Status: DISCONTINUED | OUTPATIENT
Start: 2022-05-31 | End: 2022-05-31 | Stop reason: HOSPADM

## 2022-05-31 RX ADMIN — Medication 500 UNITS: at 16:25

## 2022-05-31 RX ADMIN — SODIUM CHLORIDE 1000 ML: 9 INJECTION, SOLUTION INTRAVENOUS at 15:04

## 2022-05-31 RX ADMIN — Medication 10 ML: at 16:25

## 2022-05-31 NOTE — PROGRESS NOTES
Subjective   Holli Patel is a 56 y.o. female.  Referred by Dr. 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    3/1/2021-bilateral screening mammogram-microcalcifications seen in the posterior one third of the lateral aspect of the right breast.  Area of focal asymmetry seen in the middle one third of the upper inner quadrant of the left breast.    3/1/2021-DEXA scan-T score of -2.2 on the lumbar spine and T score of -2.3 in the left hip and T score of -1.9 in the right hip.  Findings consistent with osteopenia    3/23/2021-screening lung CT-there is a 10 x 11 mm solid nodule in the left upper lobe.  Enlarged AP window lymph node measuring 14 x 10 mm.  Suggestion of a possible 9 mm left hilar lymph node.  Heavy coronary artery calcification.    3/23/2021-diagnostic mammogram bilateral-cluster of microcalcifications in the middle third lateral aspect of the right breast.  Left breast demonstrates persistence of the area of focal asymmetry in the region.    Ultrasound-left breast ultrasound at 10 o'clock position, 6 cm from the nipple there is a 0.4 cm irregular hypoechoic lesion.  Stereotactic biopsy of the right breast calcifications recommended.  Ultrasound-guided biopsy of the left breast lesion recommended    4/7/2021-right breast stereotactic biopsy and left breast ultrasound-guided biopsy  Pathology  Right breast-invasive ductal carcinoma, grade 2, lymphovascular invasion present, ER +99% strong, CA +80% moderate, HER-2 negative, Ki-67 12%  Left breast upper inner quadrant 10 o'clock position biopsy, invasive ductal carcinoma, grade   2, ER +99% strong, CA +40% strong, HER-2 2+ on immunohistochemistry, nonamplified on FISH Ki-67 10%    4/14/2021-PET/CT-FDG avid aortopulmonary window lymph node measures 0.9 cm with an SUV of 7.5.  FDG avid left hilar lymph node  measures 1 cm with an SUV of 17.2.  Irregular soft tissue density in the right breast measuring 3.7 x 3.8 cm is favored to be secondary to the recent breast biopsy.  1.1 cm pulmonary nodule within the left upper lobe with SUV 9.7 .  Sub-6 mm pulmonary nodules are present in the right lower lobe.  No evidence of lymphadenopathy or metastatic disease in the abdomen.  Focal area of FDG uptake within the central canal posterior to T11-T12 displaced demonstrating an SUV of 5.5 thought to be reactive however MRI is recommended for further evaluation.    She was seen by Dr. Molina who initially planned for breast surgery however  due to the PET abnormalities she has been referred to Dr. Kerr who plans to do a wound on 4/30/2021.  Dr. Molina's and Dr. Kerr's notes reviewed.    Patient denies any family history of breast cancer.  Her mother had lymphoma.  Maternal grandmother had colon cancer.  Prior to that screening mammogram she did not have any palpable abnormalities of either breast.    She has been a heavy smoker for about 40 years and smoked 2 packs/day.  Denies any recent weight changes, new bone pains, cough, abdominal pain nausea vomiting constipation or diarrhea.  She does have anxiety and has been on several medications.  She has recently been started on Xanax to help with the mood and also with insomnia.    Patient had a video-assisted thoracoscopy and mediastinal lymphadenectomy on 4/30/2021.  L5-L6 lymph nodes were biopsied however the small pulmonary nodule in the left upper lobe was not difficult to find.  Pathology showed moderately differentiated adenocarcinoma which is CK7 and TTF-1 positive.  Consistent with lung primary.  Ki-67 85%.    5/14/2021-MRI of the brain-minimal chronic small vessel ischemic change in the white matter.  Otherwise negative MRI.    5/20/2021-bilateral axillary ultrasound-no evidence of axillary lymphadenopathy in either axilla.  Normal-appearing bilateral axillary lymph  nodes visualized.    Cycle 1 cisplatin and Alimta on 5/25/2021.    Cycle 2 cisplatin Alimta 6/15/2021    Cycle 3 cisplatin Alimta 7/7/2021    Completed radiation on 7/12/2021    Port was nonfunctional hence a port study was performed which showed that the port was backed up into the innominate vein and also there was a nonocclusive thrombus at the end of the catheter extending into the superior vena cava.  She was started on anticoagulation with Eliquis.  It was recommended for port revision of the intention is to keep the port.    PET/CT 8/5/2021-images independently reviewed and interpreted by me-decrease in size and FDG uptake of the left upper lobe pulmonary nodule as well as mediastinal and left hilar lymphadenopathy representing response to treatment.  Sub-6 mm pulmonary nodule in the left upper lobe new since 4/14/2021.  This could be related to radiation however follow-up CT in 3 months recommended.  Decrease in size of the irregular masslike tissue in the right breast which is postbiopsy hematoma.  This is not PET avid.  New segmental left eighth rib fractures healing.  A new band of sclerosis of the left seventh rib which favored to represent healing nondisplaced fracture.  Follow-up CT recommended.  focal uptake in the duodenum first and second portions.  Could be related to duodenitis.  Indeterminate lesion in the L1 vertebral body not well evaluated on PET/CT.    10/1/2021-MRI of the lumbar spine.  Lesion in the left posterior body of L1 measures 14 x 14 x 12 mm and previously 5 x 5 x 6 mm.  The interval enlargement strongly suggest that it is metastatic lesion.  No additional osseous metastasis noted.   Other chronic changes noted.    10/14/2021-biopsy of the lumbar spine lesion-pathology consistent with poorly differentiated carcinoma.  The staining is similar to the prior tumors and fevers this being metastatic poorly differentiated pulmonary adenocarcinoma.    10/26/2021-brain MRI-no evidence of  metastatic disease.    10/26/2021-bilateral diagnostic mammogram and ultrasound  Scattered fibroglandular density in both breast.  Postbiopsy hematoma with internal biopsy clip in the upper outer quadrant of the right breast, 8 cm from the nipple.  The hematoma size is decreased to 2.9 cm from 3.6 cm earlier.    Left breast-parenchymal density measuring 9 x 10 mm has markedly decreased.  Few adjacent calcifications which are unchanged.  No new abnormality in the left breast.  At 10:00 region there is some minimal adjacent hypoechoic texture which is difficult to measure but appears smaller since the previous exam.    10/28/2021-PET/CT  New L1 and L2 lytic bone metastasis annually intensely hypermetabolic focus at the left adrenal gland likely represents metastatic disease as well.  Slight decrease in size of the 0.9 x 0.7 cm left upper lobe pulmonary nodule.  There is hypermetabolic activity related to radiation pneumonitis.  The remainder of the nodule is photopenic.  Uncertain etiology of the more intense activity along the right lateral peripheral margin of the 2.6 cm postbiopsy density of the right breast.    She completed radiation to to the lumbar spine on 11/19/2021 11/22/2021-cycle 1 Alimta and Keytruda    3/23/2022-bilateral diagnostic mammogram and ultrasound  Complete resolution of the microcalcifications in the upper outer quadrant of the right breast and decrease in size of the postbiopsy hematoma.  No suspicious abnormalities in the right breast.  Benign-appearing calcifications at the site of malignancy in the left breast upper inner quadrant.  No other suspicious findings.    4/11/2022 PET/CT-there is new groundglass opacities in the left upper lobe which are most likely postradiation changes.  Other groundglass opacities in the left lung as well as the right lung remained stable.  Increased nodularity of the left adrenal gland with an SUV of 5.6, previously 3.5.    Increased uptake in the L1  vertebra in the right posterior aspect with an SUV of 3.6.  Left L1 sclerosis increased    Due to this the case was discussed in multidisciplinary conference.  The disease progression was significant in the left adrenal gland as well as the L1 vertebra.  Since there were only 2 areas of disease progression it has been decided to proceed with radiation to these 2 spots and continue on Keytruda and Alimta.  She presents today to discuss the recommendations from the multidisciplinary conference and also for treatment.      Interval history:  Holli Patel is a 56-year-old female with the above-mentioned history returns to the office today for scheduled Keytruda and Alimta.  She reports feeling extremely tired.  Denies any new aches or pains.  Scheduled for Alimta Keytruda today however her creatinine is noted to be elevated at 2.0.  She is currently on lisinopril 10 mg p.o. daily previously on 20 mg and dose has been decreased by her primary care physician due to the CHASITY.  Reports a good urine output.  She has stopped taking the Lasix since the previous increase in creatinine.      The following portions of the patient's history were reviewed and updated as appropriate: allergies, current medications, past family history, past medical history, past social history, past surgical history and problem list.    Past Medical History:   Diagnosis Date   • Anxiety    • Clot     POSSIBLE BLOOD CLOT IN VENOUS ACCESS DEVICE-PT STATES WAS STARTED ON ELIQUIS    • Dyspnea on exertion    • Elevated cholesterol    • Frequent urination     DAY AND NIGHT   • SHAYY (generalized anxiety disorder)     RELATED HIGH BLOOD PRESSURE   • GERD (gastroesophageal reflux disease)    • High blood pressure     ANXIETY RELATED   • Hyperlipidemia    • Hypothyroidism    • Lung cancer (HCC)    • Lung nodule     LEFT   • Malignant neoplasm of upper-outer quadrant of right breast in female, estrogen receptor positive (HCC) 4/13/2021    PT STATES HAS BILAT  BREAST CANCER   • Migraine    • PONV (postoperative nausea and vomiting)         Past Surgical History:   Procedure Laterality Date   • BREAST BIOPSY Bilateral 2021    Right Breast 10 o'clock & Left breast 10 o'clock 6 cm from nipple, [facility]   • CLOSED REDUCTION HIP DISLOCATION Left 2021    Procedure: BRONCHOSCOPY, LEFT VIDEO ASSITED THORACOSCOPY, ROBOTIC ASSSITED MEDIASTINAL LYMPHADENECTOMY WITH INTERCOSTAL NERVE BLOCKS;  Surgeon: Raphael Kerr III, MD;  Location: [facility] MAIN OR;  Service: DaVinci;  Laterality: Left;   • COLONOSCOPY     • TUBAL ABDOMINAL LIGATION     • VENOUS ACCESS DEVICE (PORT) INSERTION Right 2021    Procedure: INSERTION VENOUS ACCESS DEVICE;  Surgeon: Raphael Kerr III, MD;  Location: [facility] MAIN OR;  Service: Thoracic;  Laterality: Right;   • VENOUS ACCESS DEVICE (PORT) INSERTION Right 2021    Procedure: REVISION AND REPLACEMENT RIGHT SUBCLAVIAN VENOUS ACCESS PORT;  Surgeon: Raphael Kerr III, MD;  Location: [facility] MAIN OR;  Service: Thoracic;  Laterality: Right;   • WRIST SURGERY Right         Family History   Problem Relation Age of Onset   • Cancer Father         LYMPHATIC   • Prostate cancer Father    • Lymphoma Father    • Alcohol abuse Brother    • Colon cancer Maternal Grandmother    • Malig Hyperthermia Neg Hx         Social History     Socioeconomic History   • Marital status:      Spouse name: Jelani   Tobacco Use   • Smoking status: Former Smoker     Packs/day: 1.00     Years: 30.00     Pack years: 30.00     Types: Electronic Cigarette, Cigarettes     Start date: 1981     Quit date:      Years since quittin.4   • Smokeless tobacco: Never Used   • Tobacco comment: ABT 15 CIGARETTES DAILY   Vaping Use   • Vaping Use: Some days   • Substances: Nicotine, Flavoring   • Devices: Disposable   Substance and Sexual Activity   • Alcohol use: Never   • Drug use: Never   • Sexual activity: Yes     Partners: Male        OB History        2     Para   2    Term   2            AB        Living           SAB        IAB        Ectopic        Molar        Multiple        Live Births                Age of menarche-10  Age at menopause-42  Oral contraceptive pill use-15 years  No HRT use  She has 2 children  Age at first live childbirth-17    No Known Allergies     Review of Systems   Review of systems as mentioned in the HPI    Objective   not currently breastfeeding.       Physical Exam  Vitals reviewed.   HENT:      Head: Normocephalic.   Eyes:      Pupils: Pupils are equal, round, and reactive to light.   Cardiovascular:      Rate and Rhythm: Normal rate and regular rhythm.      Pulses: Normal pulses.      Heart sounds: Normal heart sounds.   Pulmonary:      Effort: Pulmonary effort is normal.      Breath sounds: Normal breath sounds.   Abdominal:      General: Abdomen is flat.   Musculoskeletal:         General: No swelling. Normal range of motion.      Cervical back: Normal range of motion.   Skin:     General: Skin is warm.   Neurological:      General: No focal deficit present.      Mental Status: She is alert and oriented to person, place, and time.   Psychiatric:         Mood and Affect: Mood normal.         Behavior: Behavior normal.       I have reexamined the patient and the results are consistent with the previously documented exam. Salma Snell MD          CBC reviewed and hemoglobin stable at 9.1, WBC and ANC within normal limits.            No radiology results for the last 30 days.    2022  CBC reviewed and hemoglobin low at 8.4, .2  CMP reviewed and creatinine elevated at 2.01, BUN 23, LFTs normal    Assessment/Plan      *Bilateral breast cancer  · Invasive ductal carcinoma in both right and left breast and lesions measure under 1 cm.  No palpable lymphadenopathy although unsure if this has been evaluated by an axillary ultrasound.  No abnormal axillary lymphadenopathy noted on PET.  · Most likely clinical T1b N0 M0,  both cancers were noted to be grade 2, ER/NV positive and HER-2 negative and Ki-67 less than 15%  VATS on 4/30/2021  Biopsy confirms adenocarcinoma of pulmonary primary  Discussed with Dr. Molina about delaying breast surgery and she is in agreement  Darted on neoadjuvant anastrozole  · Patient experienced significant adverse effects with anastrozole  · Treatment changed to letrozole   · Axillary ultrasound 4/20/2021 -negative for any axillary lymphadenopathy  · Started letrozole in May 2021  · She continues on letrozole without any problems.  · 8/24/2021 bilateral diagnostic mammogram and ultrasound.  In the left breast the tumor now measures 1 cm on mammogram as opposed to 0.8 cm in March.  Right breast calcifications also noted to be in a large extent although the number seems to be decreased.  Hematoma has decreased in size.  · 10/26/2021-bilateral diagnostic mammogram and ultrasound-decrease in size of the bilateral breast lesions.  · 10/26/2021-PET/CT-metastatic lesions in the left adrenal gland, L1-L2.  · 10/26/2021-MRI of the brain-no evidence of metastatic disease  · 3/23/2022-bilateral diagnostic mammogram and ultrasound show complete resolution of the tumors.  · Continue letrozole, good response to treatment.  · Unchanged    *Adenocarcinoma of the left lung, metastatic   · Most likely stage T1 N2 M0, stage III  · MRI of the thoracic spine ordered to further evaluate the abnormality seen on PET  · MRI of the brain negative   · Given that she is only 55 and fairly good performance status I discussed with her by with concurrent chemoradiation with cisplatin and Alimta every 3 weeks followed by durvalumab.  · Adverse effects including but not limited to myelosuppression, increased risk of infections, nausea, vomiting, renal dysfunction, ototoxicity, neurotoxicity have been discussed at length.  · MRI of the thoracic spine performed 5/24/2021 shows no evidence of metastatic disease in the thoracic spine  however in L1 there is a 7 mm lesion which is indeterminate.  A lumbar spine MRI has been recommended.  · Discussed her case with Dr. Oates and he feels like MRI of the lumbar spine may also show that this lesion is indeterminate.  · Discussed the same with the patient and her .  · Even if this is metastatic disease this might be the only lesion of metastatic disease and then would consider this oligometastatic disease.  · Therefore plan to proceed with concurrent chemoradiation as scheduled.  · cisplatin and Alimta, C1D1 5/25/2021  · Day 1 of radiation 5/25/2021  · 5/27/2021-MRI of the lumbar spine-there is a 6 x 5 x 6 mm enhancing lesion in the left posterior body of L1 vertebra.  This is considered indeterminate.  Follow-up recommended.  Degenerative changes noted at L5-S1  · 7/7/2021, cycle 3 cisplatin Alimta  · 7/12/2021-radiation completed  · PET/CT 8/6/2021 with good response to chemoradiation.  · MRI of the lumbar spine 10/1/2021 with increase in size of the L1 lesion which now measures 14 mm as opposed to 6 mm in May 2021.  · 10/14/2021-biopsy of the L1 lesion and pathology consistent with adenocarcinoma of the lung, TPS 0  · 0/26/2021-PET/CT-metastatic lesions in the left adrenal gland, L1-L2.  · 10/26/2021-MRI of the brain-no evidence of metastatic disease  · Given that she only has metastatic disease in L1-L2 and left adrenal gland I think it would be possible to radiate all these regions.  · Completed radiation to L1-L2 on 11/19/2021  · 11/22/2021-cycle 1 of Keytruda and Alimta  · Completed radiation to the left adrenal gland  · 1/18/2022-PET/CT-images independently reviewed and interpreted by me.  Decrease in size of the 7 mm pulmonary nodule in the left upper lobe which previously measured 9 mm.  Radiation pneumonitis is nearly resolved.  Hypermetabolic activity at the L1-L2 metastasis has resolved.  No new suspicious metastatic disease.  · Guardant 360 circulating DNA level  decreasing.  · 3/7/2022-patient is complaining of fluid retention.  · Thought to be secondary to Alimta and started on steroids and Lasix.  · Developed CHASITY on Lasix since Lasix discontinued.  · Alimta dose has been reduced.   · PET/CT 4/11/2022 concerning for disease progression in the left adrenal gland as well as L1 vertebra on the left.  · Case presented and discussed at the thoracic multidisciplinary conference.  The plan is to proceed with Keytruda and Alimta and radiation to the left adrenal gland and the L1 vertebra.  · She will be referred back to radiation oncology, she had to reschedule her appointment due to COVID-19 infection in the family.  She is scheduled to see radiation oncology 6/1/2022    *Fluid retention  · Secondary to Alimta  · Discontinue Lasix due to CHASITY  · Periorbital edema and fluid retention unchanged    *CHASITY  · Most likely secondary to Lasix  · Discontinue Lasix  · 1 L normal saline 3/28/2022  · Kidney function improved, Creatinine 1.69 and BUN 23.  Encourage patient to increase oral intake.  Discouraged intake of caffeinated beverages.  Patient to return in 1 week for labs only around the time of her schedule PET scan appointment.  · 4/19/2022-creatinine 1.36.  · 5/10/2022-creatinine elevated at 1.96.  · 5/31/2022 creatinine elevated at 2.01  · Hold Keytruda and Alimta  · Refer to nephrology    *Anemia  · Hemoglobin 7.9   · Symptomatic  · 2 units of PRBC  · Hgb improved to 9.8 today   · 4/11/2022 hemoglobin stable at 8.8  · 5/31/2022-hemoglobin lower at 8.4    *Bilateral breast cancer-no family history of breast cancer but given synchronous breast cancer genetic testing has been sent.  Genetic testing with a variant of unknown significance.    *Right hand numbness and difficulty with grasping objects  · Previously had history of carpal tunnel requiring repair.  · She is currently on letrozole which could cause worsening of the carpal tunnel  · Gabapentin dose will be increased to 600 mg  3 times daily  · She will also be referred to hand surgery for further evaluation.  · She now reports bilateral upper extremity numbness.  · Continue gabapentin  · MRI of the brain 10/26/2021 without any evidence of metastatic disease  · Not an issue today    *Anxiety-  · Currently on Xanax 1 mg nightly  · She required Valium for the MRI as she had claustrophobia and a panic attack  · Continue Zyprexa.  · Unchanged    *Depression  · Continue Zyprexa  · Related to cancer diagnosis and current situation where she is having to care for her elderly mother.  · Unchanged    *Left-sided chest wall pain-secondary to VATS.  Most likely neuropathic.  Continue gabapentin and Norco as needed.  She ran out of gabapentin and experienced leg cramps.    Continue gabapentin  Improved      *Hypertension-discontinue lisinopril.  Blood pressure remains low at 100/68  Recommend checking blood pressure at home  Low blood pressure could be contributing to CAD I  1 L normal saline today      *Smoking-not addressed today    *GERD  · 7/7/2021, patient complains of reflux despite the use of Prilosec 20 mg twice daily.  We will send her a prescription for Carafate slurry 4 times daily.  · She has not quite started using the Carafate.  · Continue Prilosec and Carafate  · PET/CT 4/11/2022 shows increased uptake in the proximal esophagus.  Likely secondary to GERD.  · Continue current medications    *Port not returning blood    · A new Mediport was placed by thoracic surgery 11/29/2021, Eliquis has been discontinued and right chest Mediport is now safe to use.    *Fatigue  · ?  Later to Keytruda  · Will consider starting prednisone if no improvement in fatigue with delay in treatment      PLAN:  1. Hold Alimta and Keytruda today  2. Radiation to the left adrenal gland and L1 vertebral body, appointment scheduled 6/1/2022  3. Continue PPI   4. Continue Zyprexa for anxiety and depression  5. 1 L normal saline today for elevated  creatinine  6. Referral to nephrology  7. Hold PET/CT  8. Follow-up with me next week with a repeat CMP and to decide on chemotherapy       Salma Snell MD

## 2022-06-01 ENCOUNTER — CONSULT (OUTPATIENT)
Dept: RADIATION ONCOLOGY | Facility: HOSPITAL | Age: 57
End: 2022-06-01

## 2022-06-01 ENCOUNTER — APPOINTMENT (OUTPATIENT)
Dept: RADIATION ONCOLOGY | Facility: HOSPITAL | Age: 57
End: 2022-06-01

## 2022-06-01 VITALS
SYSTOLIC BLOOD PRESSURE: 114 MMHG | WEIGHT: 166.4 LBS | TEMPERATURE: 97.9 F | DIASTOLIC BLOOD PRESSURE: 76 MMHG | BODY MASS INDEX: 29.48 KG/M2 | OXYGEN SATURATION: 95 % | HEART RATE: 59 BPM

## 2022-06-01 DIAGNOSIS — C79.51 NON-SMALL CELL LUNG CANCER METASTATIC TO BONE: ICD-10-CM

## 2022-06-01 DIAGNOSIS — C34.90 NON-SMALL CELL LUNG CANCER METASTATIC TO ADRENAL GLAND: Primary | ICD-10-CM

## 2022-06-01 DIAGNOSIS — C79.70 NON-SMALL CELL LUNG CANCER METASTATIC TO ADRENAL GLAND: Primary | ICD-10-CM

## 2022-06-01 DIAGNOSIS — C34.90 NON-SMALL CELL LUNG CANCER METASTATIC TO BONE: ICD-10-CM

## 2022-06-01 PROCEDURE — 99215 OFFICE O/P EST HI 40 MIN: CPT | Performed by: RADIOLOGY

## 2022-06-01 PROCEDURE — G0463 HOSPITAL OUTPT CLINIC VISIT: HCPCS

## 2022-06-03 ENCOUNTER — OFFICE VISIT (OUTPATIENT)
Dept: RADIATION ONCOLOGY | Facility: HOSPITAL | Age: 57
End: 2022-06-03

## 2022-06-03 VITALS
DIASTOLIC BLOOD PRESSURE: 91 MMHG | SYSTOLIC BLOOD PRESSURE: 141 MMHG | BODY MASS INDEX: 29.24 KG/M2 | OXYGEN SATURATION: 98 % | HEART RATE: 70 BPM | WEIGHT: 165 LBS

## 2022-06-03 DIAGNOSIS — C79.51 NON-SMALL CELL LUNG CANCER METASTATIC TO BONE: ICD-10-CM

## 2022-06-03 DIAGNOSIS — C34.90 NON-SMALL CELL LUNG CANCER METASTATIC TO BONE: ICD-10-CM

## 2022-06-03 DIAGNOSIS — C34.90 NON-SMALL CELL LUNG CANCER METASTATIC TO ADRENAL GLAND: ICD-10-CM

## 2022-06-03 DIAGNOSIS — Z17.0 MALIGNANT NEOPLASM OF UPPER-INNER QUADRANT OF LEFT BREAST IN FEMALE, ESTROGEN RECEPTOR POSITIVE: ICD-10-CM

## 2022-06-03 DIAGNOSIS — C50.212 MALIGNANT NEOPLASM OF UPPER-INNER QUADRANT OF LEFT BREAST IN FEMALE, ESTROGEN RECEPTOR POSITIVE: ICD-10-CM

## 2022-06-03 DIAGNOSIS — C34.12 PRIMARY ADENOCARCINOMA OF UPPER LOBE OF LEFT LUNG: Primary | ICD-10-CM

## 2022-06-03 DIAGNOSIS — C79.70 NON-SMALL CELL LUNG CANCER METASTATIC TO ADRENAL GLAND: ICD-10-CM

## 2022-06-03 PROCEDURE — 77334 RADIATION TREATMENT AID(S): CPT | Performed by: RADIOLOGY

## 2022-06-03 PROCEDURE — 77370 RADIATION PHYSICS CONSULT: CPT | Performed by: RADIOLOGY

## 2022-06-03 PROCEDURE — 99214 OFFICE O/P EST MOD 30 MIN: CPT | Performed by: RADIOLOGY

## 2022-06-03 PROCEDURE — 77263 THER RADIOLOGY TX PLNG CPLX: CPT | Performed by: RADIOLOGY

## 2022-06-03 NOTE — PROGRESS NOTES
Subjective     No ref. provider found    Cancer Staging  cT1, cN2, cM0      Dear Salma Snell MD     I had the pleasure of seeing Holli Patel  today in the [facility]. The patient is a 56  year old female who is well-known to our department from previous treatment of her left lung primary when we delivered 6600 cGy 33 treatments concurrent with chemotherapy from May 25 through July 12, 2021.  She did at that point also have a diagnosis of bilateral breast cancers which we anticipated treating thereafter.     She underwent stereotactic biopsy and and ultrasound-guided biopsy of the abnormality in the left breast on April 7, 2021 and that pathology revealed an invasive ductal carcinoma, grade 2 measuring 2 mm from the right breast.  There was no perineural nor lymphovascular invasion noted.  There was associated DCIS of low to intermediate grade with no necrosis.  That tissue was found to be strongly positive for both estrogen and progesterone receptors.  Biopsy from the left breast also revealed invasive ductal carcinoma measuring 4.5 mm, grade 2, again without perineural or lymphovascular invasion.  This tissue was also found to be strongly positive for both estrogen and progesterone receptors.  Both specimens were also negative for the HER-2/melinda oncogene.     She underwent a PET scan on April 14, 2021 which showed hypermetabolic AP window node measuring 9 mm with an SUV of 7.5, left hilar lymphadenopathy with an SUV of 17.2, irregular lobulated lesion in the right breast measuring 3.7 x 3.8 cm felt to be secondary to recent biopsy, 1.1 cm left upper lobe nodule with an SUV of 9.7, focal area of uptake was noted within the central canal posterior to the T11-12 disc space and subtle area of asymmetric uptake was noted within the right acetabulum felt to be benign.     She completed genetic testing on April 16, 2021 which showed no significant variant.  She went to surgery on April 30, 2021 for a  planned bronchoscopy, left video-assisted thoracoscopy and robotic assisted mediastinal lymphadenectomy.  Lymph nodes were harvested from the L5 and L6 region and frozen section revealed metastatic breast cancer.  An attempt was made to find the left upper lobe nodule but it was not able to be identified and the procedure was discontinued.  The final pathology revealed both nodes to be diffusely involved by metastatic moderately differentiated adenocarcinoma felt to be of pulmonary origin.  The L6 node measured 3 cm in greatest dimension and the L5 measured 1.5 cm with extranodal extension noted.  The tissue was found to be negative for PD-L1, ALK and ROS1.  Additionally, she completed an MRI of the brain on May 14, 2021 which showed no evidence of metastatic disease. Therefore this appears to be a T1 N2 M0 adenocarcinoma of lung origin though further evaluation of the thoracic spine is planned for May 25, 2021.  We then embarked on a course of concurrent chemoradiation therapy described above, 6600 cGy 33 treatments from 825 through July 12, 2021.     Follow-up PET scan on August 5, 2021 showed a decrease in the left lung lesion as well as in the mediastinal and left hilar lymphadenopathy with decrease in hypermetabolism consistent with response to treatment, new segmental left eighth rib fracture and sclerosis of the left seventh rib favoring a nondisplaced fracture, focal uptake in the duodenum and an indeterminate lesion in the L1 vertebral body.  She had had previous MRI of the thoracic spine followed by MRI of the lumbar spine on May 27, 2021 which showed a 6 x 5 x 6 mm enhancing lesion in the posterior body of L1 on the left which was indeterminate and felt to be either a benign hemangioma or bony metastasis.  There was no abnormality appreciated on PET scan and observation was recommended.     MRI of the lumbar spine on October 1, 2021 showed interval enlargement of the enhancing lesion in the left posterior  body of L1 now increased to 14 x 14 x 12 mm and extending down to the anterior margin of the left pedicle at L1.  No other lesions were appreciated.  She underwent CT-guided biopsy of this lesion on October 14, 2021 which showed scanty fragments of poorly differentiated carcinoma, PDL negative.  Given these findings further intervention for the breast cancers have been placed on hold.     She went on to MRI of the brain on October 26, 2021 which showed a 5 mm focus of hyperintensity in the right posterior frontal region which was new but without enhancement consistent with a late acute to subacute ischemic infarct.  No other evidence of abnormalities appreciated.  PET scan completed on October 28, 2021 showed a slight decrease in the size of the 9 x 7 mm left upper lobe nodule which is photopenic, hypermetabolic activity in the area of previous radiation with an SUV of 3.6, no lymphadenopathy otherwise in the chest, resolution of the 6 mm left upper lobe nodule, less fluid in the right breast, no hypermetabolic axillary or subpectoral lymphadenopathy, a new 1.5 cm lytic lesion within the left aspect of L1 with an SUV of 13.3, new lytic lesion at the left L2 with an SUV of 8.1 and a punctate focus of hypermetabolic activity at the anterior aspect of L2 vertebral body which was new.  Additionally a new focus of hypermetabolic activity at a slightly prominent portion of the left adrenal gland is also noted with an SUV of 7.8.     Given these findings, her systemic therapy will be changed to Keytruda and Alimta and she will continue on with the letrozole. I was asked to see the patient at the request of the referring provider noted above for advice and recommendations regarding this diagnosis.      Interval hx 6/1/22:  To sum up her previous radiation treatments she completed radiation to the left lung/hilum 66Gy July 2021.  She completed SBRT to left adrenal metastases 35Gy in 5 fractions November 2021 and she  completed SBRT to L1 November 2021 27Gy in 3 fractions.       She then proceeded with cycle 1 Alimta and Keytruda. She had a bilateral diagnostic mammogram and ultrasound on 3/23/22 which showed complete resolution of the microcalcifications in the upper outer quadrant of the right breast and decrease in size of the postbiopsy hematoma.  No suspicious abnormalities in the right breast.Benign-appearing calcifications at the site of malignancy in the left breast upper inner quadrant.  No other suspicious findings.     She had a PET scan on 4/11/2022 which showed new groundglass opacities in the left upper lobe which are most likely postradiation changes.  Other groundglass opacities in the left lung as well as the right lung remained stable. Increased nodularity of the left adrenal gland with an SUV of 5.6, previously 3.5.    Increased uptake in the L1 vertebra in the right posterior aspect with an SUV of 3.6.  Left L1 sclerosis increased     Due to this the case was discussed in multidisciplinary conference.  The disease progression was significant in the left adrenal gland as well as the L1 vertebra.  Since there were only 2 areas of disease progression it has been decided to proceed with radiation to these 2 spots and continue on Keytruda and Alimta.  She presents today to discuss the recommendations from the multidisciplinary conference and also for treatment.    Interval hx 6/3/22:  I discussed her case at our peer review conference today on Friday Manisha 3 with Dr. Ann and Dr. Chandra and reviewed all imaging and previous treatment plans.  She returns today to discuss the recommendations.  She has no new complaints.           Review of Systems   Constitutional: Positive for fatigue.   Cardiovascular: Negative.    Musculoskeletal: Positive for arthralgias.   Psychiatric/Behavioral: The patient is nervous/anxious.          Past Medical History:   Diagnosis Date   • Anxiety    • Clot     POSSIBLE BLOOD CLOT IN VENOUS  ACCESS DEVICE-PT STATES WAS STARTED ON ELIQUIS    • Dyspnea on exertion    • Elevated cholesterol    • Frequent urination     DAY AND NIGHT   • SHAYY (generalized anxiety disorder)     RELATED HIGH BLOOD PRESSURE   • GERD (gastroesophageal reflux disease)    • High blood pressure     ANXIETY RELATED   • Hyperlipidemia    • Hypothyroidism    • Lung cancer (HCC)    • Lung nodule     LEFT   • Malignant neoplasm of upper-outer quadrant of right breast in female, estrogen receptor positive (HCC) 2021    PT STATES HAS BILAT BREAST CANCER   • Migraine    • PONV (postoperative nausea and vomiting)          Past Surgical History:   Procedure Laterality Date   • BREAST BIOPSY Bilateral 2021    Right Breast 10 o'clock & Left breast 10 o'clock 6 cm from nipple, [facility]   • CLOSED REDUCTION HIP DISLOCATION Left 2021    Procedure: BRONCHOSCOPY, LEFT VIDEO ASSITED THORACOSCOPY, ROBOTIC ASSSITED MEDIASTINAL LYMPHADENECTOMY WITH INTERCOSTAL NERVE BLOCKS;  Surgeon: Raphael Kerr III, MD;  Location: [facility];  Service: DaVinci;  Laterality: Left;   • COLONOSCOPY     • TUBAL ABDOMINAL LIGATION     • VENOUS ACCESS DEVICE (PORT) INSERTION Right 2021    Procedure: INSERTION VENOUS ACCESS DEVICE;  Surgeon: Raphael Kerr III, MD;  Location: [facility];  Service: Thoracic;  Laterality: Right;   • VENOUS ACCESS DEVICE (PORT) INSERTION Right 2021    Procedure: REVISION AND REPLACEMENT RIGHT SUBCLAVIAN VENOUS ACCESS PORT;  Surgeon: Raphael Kerr III, MD;  Location: [facility];  Service: Thoracic;  Laterality: Right;   • WRIST SURGERY Right          Social History     Socioeconomic History   • Marital status:      Spouse name: Jelani   Tobacco Use   • Smoking status: Former Smoker     Packs/day: 1.00     Years: 30.00     Pack years: 30.00     Types: Electronic Cigarette, Cigarettes     Start date: 1981     Quit date:      Years since quittin.4   • Smokeless tobacco: Never Used   •  Tobacco comment: ABT 15 CIGARETTES DAILY   Vaping Use   • Vaping Use: Some days   • Substances: Nicotine, Flavoring   • Devices: Disposable   Substance and Sexual Activity   • Alcohol use: Never   • Drug use: Never   • Sexual activity: Yes     Partners: Male         Family History   Problem Relation Age of Onset   • Cancer Father         LYMPHATIC   • Prostate cancer Father    • Lymphoma Father    • Alcohol abuse Brother    • Colon cancer Maternal Grandmother    • Malig Hyperthermia Neg Hx           Objective    Physical Exam  Constitutional:       Appearance: Normal appearance.   Eyes:      Extraocular Movements: Extraocular movements intact.   Pulmonary:      Effort: Pulmonary effort is normal.   Musculoskeletal:         General: Normal range of motion.   Neurological:      General: No focal deficit present.      Mental Status: She is alert.   Psychiatric:         Mood and Affect: Mood normal.           Current Outpatient Medications on File Prior to Visit   Medication Sig Dispense Refill   • ALPRAZolam (XANAX) 0.5 MG tablet Take 1 tablet by mouth 2 (Two) Times a Day As Needed for Anxiety. 60 tablet 2   • apixaban (Eliquis) 5 MG tablet tablet Take 1 tablet by mouth Every 12 (Twelve) Hours. (Patient taking differently: Take 5 mg by mouth Every 12 (Twelve) Hours. HOLDING FOR SURGERY) 60 tablet 5   • buPROPion XL (WELLBUTRIN XL) 300 MG 24 hr tablet Take 1 tablet by mouth Daily. 30 tablet 5   • dexamethasone (DECADRON) 4 MG tablet      • FLUoxetine (PROzac) 40 MG capsule TAKE ONE CAPSULE BY MOUTH DAILY 90 capsule 1   • folic acid (FOLVITE) 1 MG tablet Take 1 tablet by mouth Daily. 30 tablet 3   • furosemide (LASIX) 20 MG tablet Take 1 tablet by mouth Daily. 30 tablet 2   • gabapentin (NEURONTIN) 300 MG capsule TAKE TWO CAPSULES BY MOUTH THREE TIMES A  capsule 3   • letrozole (FEMARA) 2.5 MG tablet Take 1 tablet by mouth Daily. 90 tablet 3   • levothyroxine (SYNTHROID, LEVOTHROID) 25 MCG tablet Take 1 tablet by  mouth Daily. 90 tablet 1   • lidocaine (LIDODERM) 5 % Place 1 patch on the skin as directed by provider Daily. Remove & Discard patch within 12 hours or as directed by MD 60 patch 5   • lisinopril (PRINIVIL,ZESTRIL) 20 MG tablet Take 1 tablet by mouth Daily. (Patient taking differently: Take 10 mg by mouth Daily.) 30 tablet 5   • OLANZapine (zyPREXA) 5 MG tablet TAKE ONE TABLET BY MOUTH ONCE NIGHTLY 30 tablet 1   • omeprazole (PrilOSEC) 20 MG capsule Take 1 capsule by mouth 2 (Two) Times a Day. 180 capsule 1   • ondansetron (ZOFRAN) 8 MG tablet Take 1 tablet by mouth 3 (Three) Times a Day As Needed for Nausea or Vomiting. 30 tablet 5   • oxyCODONE-acetaminophen (Percocet) 5-325 MG per tablet Take 1 tablet by mouth Every 6 (Six) Hours As Needed for Moderate Pain . 120 tablet 0   • propranolol LA (Inderal LA) 60 MG 24 hr capsule Take 1 capsule by mouth Daily. 90 capsule 1   • rizatriptan (MAXALT) 10 MG tablet Take 1 tablet by mouth 1 (One) Time for 1 dose. AT ONSET OF HEADACHE MAY REPEAT ONE TABLET IN 2 HOURS IF NEEDED 12 tablet 5   • Senexon-S 8.6-50 MG per tablet TAKE TWO TABLETS BY MOUTH DAILY (Patient taking differently: Take 2 tablets by mouth As Needed.) 60 tablet 1   • simvastatin (ZOCOR) 20 MG tablet Take 1 tablet by mouth Daily. 90 tablet 1     No current facility-administered medications on file prior to visit.       ALLERGIES:  No Known Allergies    /91   Pulse 70   Wt 74.8 kg (165 lb)   LMP  (LMP Unknown)   SpO2 98%   BMI 29.24 kg/m²      Current Status 5/31/2022   ECOG score 0         Assessment & Plan   56 year old female with metastatic lung cancer as well as bilateral breast cancer now with progressive disease in L1 and left adrenal gland which were previously treated with sBRT.  I discussed her case in peer review with my partners and the consensus recommendation was to proceed with repeat stereotactic radiotherapy to both sites with recommend dose of possibly 14gy in one fraction to L1 and  30-35 Gy in 5 fractions to left adrenal gland.  I discussed possible side effects of damage to left kidney or stomach and fracture of L1.  She voiced understanding.  We will proceed with simulation today and begin her treatments in the next 1-2 weeks.     I personally spent greater than 30 minutes today assessing, managing, discussing and documenting my visit with the patient. That time includes review of records, imaging and pathology reports, obtaining my own history, performing a medically appropriate evaluation, counseling and educating the patient, discussing goals, logistics, alternatives and risks of my recommendations, surveillance options and potential outcomes. It also includes the time documenting the clinical information in the EMR and communicating my recommendations to the other involved physicians.      .             Thank you very much for allowing me to participate in the care of this very pleasant patient.    Sincerely,      Patty Franklin MD

## 2022-06-06 ENCOUNTER — DOCUMENTATION (OUTPATIENT)
Dept: RADIATION ONCOLOGY | Facility: HOSPITAL | Age: 57
End: 2022-06-06

## 2022-06-07 ENCOUNTER — INFUSION (OUTPATIENT)
Dept: ONCOLOGY | Facility: HOSPITAL | Age: 57
End: 2022-06-07

## 2022-06-07 ENCOUNTER — OFFICE VISIT (OUTPATIENT)
Dept: ONCOLOGY | Facility: CLINIC | Age: 57
End: 2022-06-07

## 2022-06-07 ENCOUNTER — APPOINTMENT (OUTPATIENT)
Dept: PET IMAGING | Facility: HOSPITAL | Age: 57
End: 2022-06-07

## 2022-06-07 VITALS
TEMPERATURE: 98.7 F | DIASTOLIC BLOOD PRESSURE: 85 MMHG | WEIGHT: 165.7 LBS | SYSTOLIC BLOOD PRESSURE: 132 MMHG | BODY MASS INDEX: 29.36 KG/M2 | OXYGEN SATURATION: 96 % | HEIGHT: 63 IN | HEART RATE: 66 BPM | RESPIRATION RATE: 18 BRPM

## 2022-06-07 DIAGNOSIS — Z79.899 HIGH RISK MEDICATION USE: ICD-10-CM

## 2022-06-07 DIAGNOSIS — C34.12 PRIMARY ADENOCARCINOMA OF UPPER LOBE OF LEFT LUNG: Primary | ICD-10-CM

## 2022-06-07 DIAGNOSIS — C34.12 PRIMARY ADENOCARCINOMA OF UPPER LOBE OF LEFT LUNG: ICD-10-CM

## 2022-06-07 LAB
ALBUMIN SERPL-MCNC: 3.8 G/DL (ref 3.5–5.2)
ALBUMIN/GLOB SERPL: 1.1 G/DL (ref 1.1–2.4)
ALP SERPL-CCNC: 120 U/L (ref 38–116)
ALT SERPL W P-5'-P-CCNC: 12 U/L (ref 0–33)
ANION GAP SERPL CALCULATED.3IONS-SCNC: 10.8 MMOL/L (ref 5–15)
AST SERPL-CCNC: 15 U/L (ref 0–32)
BASOPHILS # BLD AUTO: 0.1 10*3/MM3 (ref 0–0.2)
BASOPHILS NFR BLD AUTO: 1.1 % (ref 0–1.5)
BILIRUB SERPL-MCNC: 0.2 MG/DL (ref 0.2–1.2)
BUN SERPL-MCNC: 17 MG/DL (ref 6–20)
BUN/CREAT SERPL: 9.9 (ref 7.3–30)
CALCIUM SPEC-SCNC: 9.4 MG/DL (ref 8.5–10.2)
CHLORIDE SERPL-SCNC: 105 MMOL/L (ref 98–107)
CO2 SERPL-SCNC: 24.2 MMOL/L (ref 22–29)
CORTIS SERPL-MCNC: 9.89 MCG/DL
CREAT SERPL-MCNC: 1.71 MG/DL (ref 0.6–1.1)
DEPRECATED RDW RBC AUTO: 69.7 FL (ref 37–54)
EGFRCR SERPLBLD CKD-EPI 2021: 34.4 ML/MIN/1.73
EOSINOPHIL # BLD AUTO: 0.55 10*3/MM3 (ref 0–0.4)
EOSINOPHIL NFR BLD AUTO: 6 % (ref 0.3–6.2)
ERYTHROCYTE [DISTWIDTH] IN BLOOD BY AUTOMATED COUNT: 17 % (ref 12.3–15.4)
GLOBULIN UR ELPH-MCNC: 3.6 GM/DL (ref 1.8–3.5)
GLUCOSE SERPL-MCNC: 86 MG/DL (ref 74–124)
HCT VFR BLD AUTO: 29.2 % (ref 34–46.6)
HGB BLD-MCNC: 8.8 G/DL (ref 12–15.9)
IMM GRANULOCYTES # BLD AUTO: 0.06 10*3/MM3 (ref 0–0.05)
IMM GRANULOCYTES NFR BLD AUTO: 0.6 % (ref 0–0.5)
LYMPHOCYTES # BLD AUTO: 1.02 10*3/MM3 (ref 0.7–3.1)
LYMPHOCYTES NFR BLD AUTO: 11 % (ref 19.6–45.3)
MCH RBC QN AUTO: 33.5 PG (ref 26.6–33)
MCHC RBC AUTO-ENTMCNC: 30.1 G/DL (ref 31.5–35.7)
MCV RBC AUTO: 111 FL (ref 79–97)
MONOCYTES # BLD AUTO: 1.08 10*3/MM3 (ref 0.1–0.9)
MONOCYTES NFR BLD AUTO: 11.7 % (ref 5–12)
NEUTROPHILS NFR BLD AUTO: 6.43 10*3/MM3 (ref 1.7–7)
NEUTROPHILS NFR BLD AUTO: 69.6 % (ref 42.7–76)
NRBC BLD AUTO-RTO: 0 /100 WBC (ref 0–0.2)
PLATELET # BLD AUTO: 306 10*3/MM3 (ref 140–450)
PMV BLD AUTO: 8.8 FL (ref 6–12)
POTASSIUM SERPL-SCNC: 4.4 MMOL/L (ref 3.5–4.7)
PROT SERPL-MCNC: 7.4 G/DL (ref 6.3–8)
RBC # BLD AUTO: 2.63 10*6/MM3 (ref 3.77–5.28)
SODIUM SERPL-SCNC: 140 MMOL/L (ref 134–145)
WBC NRBC COR # BLD: 9.24 10*3/MM3 (ref 3.4–10.8)

## 2022-06-07 PROCEDURE — 99214 OFFICE O/P EST MOD 30 MIN: CPT | Performed by: INTERNAL MEDICINE

## 2022-06-07 PROCEDURE — 25010000002 PEMBROLIZUMAB 100 MG/4ML SOLUTION 4 ML VIAL: Performed by: INTERNAL MEDICINE

## 2022-06-07 PROCEDURE — 85025 COMPLETE CBC W/AUTO DIFF WBC: CPT

## 2022-06-07 PROCEDURE — 82533 TOTAL CORTISOL: CPT | Performed by: INTERNAL MEDICINE

## 2022-06-07 PROCEDURE — 80053 COMPREHEN METABOLIC PANEL: CPT

## 2022-06-07 PROCEDURE — 96413 CHEMO IV INFUSION 1 HR: CPT

## 2022-06-07 RX ORDER — SODIUM CHLORIDE 9 MG/ML
250 INJECTION, SOLUTION INTRAVENOUS ONCE
Status: COMPLETED | OUTPATIENT
Start: 2022-06-07 | End: 2022-06-07

## 2022-06-07 RX ORDER — SODIUM CHLORIDE 9 MG/ML
250 INJECTION, SOLUTION INTRAVENOUS ONCE
Status: CANCELLED | OUTPATIENT
Start: 2028-05-31

## 2022-06-07 RX ADMIN — SODIUM CHLORIDE 250 ML: 900 INJECTION, SOLUTION INTRAVENOUS at 11:05

## 2022-06-07 RX ADMIN — SODIUM CHLORIDE 200 MG: 9 INJECTION, SOLUTION INTRAVENOUS at 11:09

## 2022-06-07 NOTE — PROGRESS NOTES
Subjective   Holli Patel is a 56 y.o. female.  Referred by Dr. 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    3/1/2021-bilateral screening mammogram-microcalcifications seen in the posterior one third of the lateral aspect of the right breast.  Area of focal asymmetry seen in the middle one third of the upper inner quadrant of the left breast.    3/1/2021-DEXA scan-T score of -2.2 on the lumbar spine and T score of -2.3 in the left hip and T score of -1.9 in the right hip.  Findings consistent with osteopenia    3/23/2021-screening lung CT-there is a 10 x 11 mm solid nodule in the left upper lobe.  Enlarged AP window lymph node measuring 14 x 10 mm.  Suggestion of a possible 9 mm left hilar lymph node.  Heavy coronary artery calcification.    3/23/2021-diagnostic mammogram bilateral-cluster of microcalcifications in the middle third lateral aspect of the right breast.  Left breast demonstrates persistence of the area of focal asymmetry in the region.    Ultrasound-left breast ultrasound at 10 o'clock position, 6 cm from the nipple there is a 0.4 cm irregular hypoechoic lesion.  Stereotactic biopsy of the right breast calcifications recommended.  Ultrasound-guided biopsy of the left breast lesion recommended    4/7/2021-right breast stereotactic biopsy and left breast ultrasound-guided biopsy  Pathology  Right breast-invasive ductal carcinoma, grade 2, lymphovascular invasion present, ER +99% strong, AL +80% moderate, HER-2 negative, Ki-67 12%  Left breast upper inner quadrant 10 o'clock position biopsy, invasive ductal carcinoma, grade   2, ER +99% strong, AL +40% strong, HER-2 2+ on immunohistochemistry, nonamplified on FISH Ki-67 10%    4/14/2021-PET/CT-FDG avid aortopulmonary window lymph node measures 0.9 cm with an SUV of 7.5.  FDG avid left hilar lymph node  measures 1 cm with an SUV of 17.2.  Irregular soft tissue density in the right breast measuring 3.7 x 3.8 cm is favored to be secondary to the recent breast biopsy.  1.1 cm pulmonary nodule within the left upper lobe with SUV 9.7 .  Sub-6 mm pulmonary nodules are present in the right lower lobe.  No evidence of lymphadenopathy or metastatic disease in the abdomen.  Focal area of FDG uptake within the central canal posterior to T11-T12 displaced demonstrating an SUV of 5.5 thought to be reactive however MRI is recommended for further evaluation.    She was seen by Dr. Molina who initially planned for breast surgery however  due to the PET abnormalities she has been referred to Dr. Kerr who plans to do a wound on 4/30/2021.  Dr. Molina's and Dr. Kerr's notes reviewed.    Patient denies any family history of breast cancer.  Her mother had lymphoma.  Maternal grandmother had colon cancer.  Prior to that screening mammogram she did not have any palpable abnormalities of either breast.    She has been a heavy smoker for about 40 years and smoked 2 packs/day.  Denies any recent weight changes, new bone pains, cough, abdominal pain nausea vomiting constipation or diarrhea.  She does have anxiety and has been on several medications.  She has recently been started on Xanax to help with the mood and also with insomnia.    Patient had a video-assisted thoracoscopy and mediastinal lymphadenectomy on 4/30/2021.  L5-L6 lymph nodes were biopsied however the small pulmonary nodule in the left upper lobe was not difficult to find.  Pathology showed moderately differentiated adenocarcinoma which is CK7 and TTF-1 positive.  Consistent with lung primary.  Ki-67 85%.    5/14/2021-MRI of the brain-minimal chronic small vessel ischemic change in the white matter.  Otherwise negative MRI.    5/20/2021-bilateral axillary ultrasound-no evidence of axillary lymphadenopathy in either axilla.  Normal-appearing bilateral axillary lymph  nodes visualized.    Cycle 1 cisplatin and Alimta on 5/25/2021.    Cycle 2 cisplatin Alimta 6/15/2021    Cycle 3 cisplatin Alimta 7/7/2021    Completed radiation on 7/12/2021    Port was nonfunctional hence a port study was performed which showed that the port was backed up into the innominate vein and also there was a nonocclusive thrombus at the end of the catheter extending into the superior vena cava.  She was started on anticoagulation with Eliquis.  It was recommended for port revision of the intention is to keep the port.    PET/CT 8/5/2021-images independently reviewed and interpreted by me-decrease in size and FDG uptake of the left upper lobe pulmonary nodule as well as mediastinal and left hilar lymphadenopathy representing response to treatment.  Sub-6 mm pulmonary nodule in the left upper lobe new since 4/14/2021.  This could be related to radiation however follow-up CT in 3 months recommended.  Decrease in size of the irregular masslike tissue in the right breast which is postbiopsy hematoma.  This is not PET avid.  New segmental left eighth rib fractures healing.  A new band of sclerosis of the left seventh rib which favored to represent healing nondisplaced fracture.  Follow-up CT recommended.  focal uptake in the duodenum first and second portions.  Could be related to duodenitis.  Indeterminate lesion in the L1 vertebral body not well evaluated on PET/CT.    10/1/2021-MRI of the lumbar spine.  Lesion in the left posterior body of L1 measures 14 x 14 x 12 mm and previously 5 x 5 x 6 mm.  The interval enlargement strongly suggest that it is metastatic lesion.  No additional osseous metastasis noted.   Other chronic changes noted.    10/14/2021-biopsy of the lumbar spine lesion-pathology consistent with poorly differentiated carcinoma.  The staining is similar to the prior tumors and fevers this being metastatic poorly differentiated pulmonary adenocarcinoma.    10/26/2021-brain MRI-no evidence of  metastatic disease.    10/26/2021-bilateral diagnostic mammogram and ultrasound  Scattered fibroglandular density in both breast.  Postbiopsy hematoma with internal biopsy clip in the upper outer quadrant of the right breast, 8 cm from the nipple.  The hematoma size is decreased to 2.9 cm from 3.6 cm earlier.    Left breast-parenchymal density measuring 9 x 10 mm has markedly decreased.  Few adjacent calcifications which are unchanged.  No new abnormality in the left breast.  At 10:00 region there is some minimal adjacent hypoechoic texture which is difficult to measure but appears smaller since the previous exam.    10/28/2021-PET/CT  New L1 and L2 lytic bone metastasis annually intensely hypermetabolic focus at the left adrenal gland likely represents metastatic disease as well.  Slight decrease in size of the 0.9 x 0.7 cm left upper lobe pulmonary nodule.  There is hypermetabolic activity related to radiation pneumonitis.  The remainder of the nodule is photopenic.  Uncertain etiology of the more intense activity along the right lateral peripheral margin of the 2.6 cm postbiopsy density of the right breast.    She completed radiation to to the lumbar spine on 11/19/2021 11/22/2021-cycle 1 Alimta and Keytruda    3/23/2022-bilateral diagnostic mammogram and ultrasound  Complete resolution of the microcalcifications in the upper outer quadrant of the right breast and decrease in size of the postbiopsy hematoma.  No suspicious abnormalities in the right breast.  Benign-appearing calcifications at the site of malignancy in the left breast upper inner quadrant.  No other suspicious findings.    4/11/2022 PET/CT-there is new groundglass opacities in the left upper lobe which are most likely postradiation changes.  Other groundglass opacities in the left lung as well as the right lung remained stable.  Increased nodularity of the left adrenal gland with an SUV of 5.6, previously 3.5.    Increased uptake in the L1  vertebra in the right posterior aspect with an SUV of 3.6.  Left L1 sclerosis increased    Due to this the case was discussed in multidisciplinary conference.  The disease progression was significant in the left adrenal gland as well as the L1 vertebra.  Since there were only 2 areas of disease progression it has been decided to proceed with radiation to these 2 spots and continue on Keytruda and Alimta.  She presents today to discuss the recommendations from the multidisciplinary conference and also for treatment.      Interval history:  Holli Patel is a 56-year-old female with the above-mentioned history returns to the office today for scheduled Keytruda and Alimta.  Continues to experience severe fatigue.  Creatinine improved to 1.76.  Held lisinopril since last visit and blood pressure improved at 132/85.  She has met with radiation oncology and the plan is to start radiation within 3 weeks.  She had an MRI of the lumbar spine yesterday, read pending      The following portions of the patient's history were reviewed and updated as appropriate: allergies, current medications, past family history, past medical history, past social history, past surgical history and problem list.    Past Medical History:   Diagnosis Date   • Anxiety    • Clot     POSSIBLE BLOOD CLOT IN VENOUS ACCESS DEVICE-PT STATES WAS STARTED ON ELIQUIS    • Dyspnea on exertion    • Elevated cholesterol    • Frequent urination     DAY AND NIGHT   • SHAYY (generalized anxiety disorder)     RELATED HIGH BLOOD PRESSURE   • GERD (gastroesophageal reflux disease)    • High blood pressure     ANXIETY RELATED   • Hyperlipidemia    • Hypothyroidism    • Lung cancer (HCC)    • Lung nodule     LEFT   • Malignant neoplasm of upper-outer quadrant of right breast in female, estrogen receptor positive (HCC) 4/13/2021    PT STATES HAS BILAT BREAST CANCER   • Migraine    • PONV (postoperative nausea and vomiting)         Past Surgical History:   Procedure  Laterality Date   • BREAST BIOPSY Bilateral 2021    Right Breast 10 o'clock & Left breast 10 o'clock 6 cm from nipple, [facility]   • CLOSED REDUCTION HIP DISLOCATION Left 2021    Procedure: BRONCHOSCOPY, LEFT VIDEO ASSITED THORACOSCOPY, ROBOTIC ASSSITED MEDIASTINAL LYMPHADENECTOMY WITH INTERCOSTAL NERVE BLOCKS;  Surgeon: Raphael Kerr III, MD;  Location: [facility] OR;  Service: DaVinci;  Laterality: Left;   • COLONOSCOPY     • TUBAL ABDOMINAL LIGATION     • VENOUS ACCESS DEVICE (PORT) INSERTION Right 2021    Procedure: INSERTION VENOUS ACCESS DEVICE;  Surgeon: Raphael Kerr III, MD;  Location: [facility] MAIN OR;  Service: Thoracic;  Laterality: Right;   • VENOUS ACCESS DEVICE (PORT) INSERTION Right 2021    Procedure: REVISION AND REPLACEMENT RIGHT SUBCLAVIAN VENOUS ACCESS PORT;  Surgeon: Raphael Kerr III, MD;  Location: [facility] OR;  Service: Thoracic;  Laterality: Right;   • WRIST SURGERY Right         Family History   Problem Relation Age of Onset   • Cancer Father         LYMPHATIC   • Prostate cancer Father    • Lymphoma Father    • Alcohol abuse Brother    • Colon cancer Maternal Grandmother    • Malig Hyperthermia Neg Hx         Social History     Socioeconomic History   • Marital status:      Spouse name: Jelani   Tobacco Use   • Smoking status: Former Smoker     Packs/day: 1.00     Years: 30.00     Pack years: 30.00     Types: Electronic Cigarette, Cigarettes     Start date: 1981     Quit date:      Years since quittin.4   • Smokeless tobacco: Never Used   • Tobacco comment: ABT 15 CIGARETTES DAILY   Vaping Use   • Vaping Use: Some days   • Substances: Nicotine, Flavoring   • Devices: Disposable   Substance and Sexual Activity   • Alcohol use: Never   • Drug use: Never   • Sexual activity: Yes     Partners: Male        OB History        2    Para   2    Term   2            AB        Living           SAB        IAB        Ectopic        Molar      "   Multiple        Live Births                Age of menarche-10  Age at menopause-42  Oral contraceptive pill use-15 years  No HRT use  She has 2 children  Age at first live childbirth-17    No Known Allergies     Review of Systems   Review of systems as mentioned in the HPI    Objective   Blood pressure 132/85, pulse 66, temperature 98.7 °F (37.1 °C), temperature source Temporal, resp. rate 18, height 160 cm (62.99\"), weight 75.2 kg (165 lb 11.2 oz), SpO2 96 %, not currently breastfeeding.       Physical Exam  Vitals reviewed.   HENT:      Head: Normocephalic.   Eyes:      Pupils: Pupils are equal, round, and reactive to light.   Cardiovascular:      Rate and Rhythm: Normal rate and regular rhythm.      Pulses: Normal pulses.      Heart sounds: Normal heart sounds.   Pulmonary:      Effort: Pulmonary effort is normal.      Breath sounds: Normal breath sounds.   Abdominal:      General: Abdomen is flat.   Musculoskeletal:         General: No swelling. Normal range of motion.      Cervical back: Normal range of motion.   Skin:     General: Skin is warm.   Neurological:      General: No focal deficit present.      Mental Status: She is alert and oriented to person, place, and time.   Psychiatric:         Mood and Affect: Mood normal.         Behavior: Behavior normal.       I have reexamined the patient and the results are consistent with the previously documented exam. Salma Snell MD        Results from last 7 days   Lab Units 06/07/22  0846 05/31/22  1337   WBC 10*3/mm3 9.24 10.17   NEUTROS ABS 10*3/mm3 6.43 6.85   HEMOGLOBIN g/dL 8.8* 8.4*   HEMATOCRIT % 29.2* 27.8*   PLATELETS 10*3/mm3 306 337   CBC reviewed and hemoglobin stable at 9.1, WBC and ANC within normal limits.  Results from last 7 days   Lab Units 05/31/22  1337   SODIUM mmol/L 140   POTASSIUM mmol/L 5.2*   CHLORIDE mmol/L 106   CO2 mmol/L 23.9   BUN mg/dL 23*   CREATININE mg/dL 2.01*   CALCIUM mg/dL 9.1   ALBUMIN g/dL 3.50   BILIRUBIN mg/dL <0.2* "   ALK PHOS U/L 117*   ALT (SGPT) U/L 11   AST (SGOT) U/L 17   GLUCOSE mg/dL 90           No radiology results for the last 30 days.    6/7/2022  CBC reviewed and hemoglobin improved at 8.8, WBC and platelet count normal  CMP-BUN 17, creatinine 1.71, LFTs normal    Assessment/Plan      *Bilateral breast cancer  · Invasive ductal carcinoma in both right and left breast and lesions measure under 1 cm.  No palpable lymphadenopathy although unsure if this has been evaluated by an axillary ultrasound.  No abnormal axillary lymphadenopathy noted on PET.  · Most likely clinical T1b N0 M0, both cancers were noted to be grade 2, ER/DC positive and HER-2 negative and Ki-67 less than 15%  VATS on 4/30/2021  Biopsy confirms adenocarcinoma of pulmonary primary  Discussed with Dr. Molina about delaying breast surgery and she is in agreement  Darted on neoadjuvant anastrozole  · Patient experienced significant adverse effects with anastrozole  · Treatment changed to letrozole   · Axillary ultrasound 4/20/2021 -negative for any axillary lymphadenopathy  · Started letrozole in May 2021  · She continues on letrozole without any problems.  · 8/24/2021 bilateral diagnostic mammogram and ultrasound.  In the left breast the tumor now measures 1 cm on mammogram as opposed to 0.8 cm in March.  Right breast calcifications also noted to be in a large extent although the number seems to be decreased.  Hematoma has decreased in size.  · 10/26/2021-bilateral diagnostic mammogram and ultrasound-decrease in size of the bilateral breast lesions.  · 10/26/2021-PET/CT-metastatic lesions in the left adrenal gland, L1-L2.  · 10/26/2021-MRI of the brain-no evidence of metastatic disease  · 3/23/2022-bilateral diagnostic mammogram and ultrasound show complete resolution of the tumors.  · Continue letrozole, good response to treatment.  · Unchanged    *Adenocarcinoma of the left lung, metastatic   · Most likely stage T1 N2 M0, stage III  · MRI of the  thoracic spine ordered to further evaluate the abnormality seen on PET  · MRI of the brain negative   · Given that she is only 55 and fairly good performance status I discussed with her by with concurrent chemoradiation with cisplatin and Alimta every 3 weeks followed by durvalumab.  · Adverse effects including but not limited to myelosuppression, increased risk of infections, nausea, vomiting, renal dysfunction, ototoxicity, neurotoxicity have been discussed at length.  · MRI of the thoracic spine performed 5/24/2021 shows no evidence of metastatic disease in the thoracic spine however in L1 there is a 7 mm lesion which is indeterminate.  A lumbar spine MRI has been recommended.  · Discussed her case with Dr. Oates and he feels like MRI of the lumbar spine may also show that this lesion is indeterminate.  · Discussed the same with the patient and her .  · Even if this is metastatic disease this might be the only lesion of metastatic disease and then would consider this oligometastatic disease.  · Therefore plan to proceed with concurrent chemoradiation as scheduled.  · cisplatin and Alimta, C1D1 5/25/2021  · Day 1 of radiation 5/25/2021  · 5/27/2021-MRI of the lumbar spine-there is a 6 x 5 x 6 mm enhancing lesion in the left posterior body of L1 vertebra.  This is considered indeterminate.  Follow-up recommended.  Degenerative changes noted at L5-S1  · 7/7/2021, cycle 3 cisplatin Alimta  · 7/12/2021-radiation completed  · PET/CT 8/6/2021 with good response to chemoradiation.  · MRI of the lumbar spine 10/1/2021 with increase in size of the L1 lesion which now measures 14 mm as opposed to 6 mm in May 2021.  · 10/14/2021-biopsy of the L1 lesion and pathology consistent with adenocarcinoma of the lung, TPS 0  · 0/26/2021-PET/CT-metastatic lesions in the left adrenal gland, L1-L2.  · 10/26/2021-MRI of the brain-no evidence of metastatic disease  · Given that she only has metastatic disease in L1-L2 and left  adrenal gland I think it would be possible to radiate all these regions.  · Completed radiation to L1-L2 on 11/19/2021  · 11/22/2021-cycle 1 of Keytruda and Alimta  · Completed radiation to the left adrenal gland  · 1/18/2022-PET/CT-images independently reviewed and interpreted by me.  Decrease in size of the 7 mm pulmonary nodule in the left upper lobe which previously measured 9 mm.  Radiation pneumonitis is nearly resolved.  Hypermetabolic activity at the L1-L2 metastasis has resolved.  No new suspicious metastatic disease.  · Guardant 360 circulating DNA level decreasing.  · 3/7/2022-patient is complaining of fluid retention.  · Thought to be secondary to Alimta and started on steroids and Lasix.  · Developed CHASITY on Lasix since Lasix discontinued.  · Alimta dose has been reduced.   · PET/CT 4/11/2022 concerning for disease progression in the left adrenal gland as well as L1 vertebra on the left.  · Case presented and discussed at the thoracic multidisciplinary conference.  The plan is to proceed with Keytruda and Alimta and radiation to the left adrenal gland and the L1 vertebra.  · She will be referred back to radiation oncology, she had to reschedule her appointment due to COVID-19 infection in the family.    · Evaluated by radiation oncology on 6/1/2022 and plan is to proceed with L1 and adrenal radiation.  MRI of the lumbar spine performed 6/6/2022  · Read pending  · Since GFR is still low we will hold Alimta but proceed with Keytruda today    *Fluid retention  · Secondary to Alimta  · Alimta being held  · Discontinue Lasix due to CHASITY    *CHASITY  · Most likely secondary to Lasix  · Discontinue Lasix  · 1 L normal saline 3/28/2022  · Kidney function improved, Creatinine 1.69 and BUN 23.  Encourage patient to increase oral intake.  Discouraged intake of caffeinated beverages.  Patient to return in 1 week for labs only around the time of her schedule PET scan appointment.  · 4/19/2022-creatinine  1.36.  · 5/10/2022-creatinine elevated at 1.96.  · 5/31/2022 creatinine elevated at 2.01  · 6/7/2022-creatinine 1.76  · Alimta will be held, continue Keytruda    *Anemia  · Hemoglobin 7.9   · Symptomatic  · 2 units of PRBC  · Hgb improved to 9.8 today   · 4/11/2022 hemoglobin stable at 8.8  · 5/31/2022-hemoglobin lower at 8.4  · 6/7/2022-hemoglobin improved at 8.8    *Bilateral breast cancer-no family history of breast cancer but given synchronous breast cancer genetic testing has been sent.  Genetic testing with a variant of unknown significance.    *Right hand numbness and difficulty with grasping objects  · Previously had history of carpal tunnel requiring repair.  · She is currently on letrozole which could cause worsening of the carpal tunnel  · Gabapentin dose will be increased to 600 mg 3 times daily  · She will also be referred to hand surgery for further evaluation.  · She now reports bilateral upper extremity numbness.  · Continue gabapentin  · MRI of the brain 10/26/2021 without any evidence of metastatic disease  · Not an issue today    *Anxiety-  · Currently on Xanax 1 mg nightly  · She required Valium for the MRI as she had claustrophobia and a panic attack  · Continue Zyprexa.  · Unchanged    *Depression  · Continue Zyprexa  · Related to cancer diagnosis and current situation where she is having to care for her elderly mother.  · Reports being extremely tired.  Continues to struggle with depression.    *Left-sided chest wall pain-secondary to VATS.  Most likely neuropathic.  Continue gabapentin and Norco as needed.  She ran out of gabapentin and experienced leg cramps.    Continue gabapentin  Improved      *Hypertension-discontinue lisinopril.    Blood pressure improved at 132/85      *Smoking-not addressed today    *GERD  · 7/7/2021, patient complains of reflux despite the use of Prilosec 20 mg twice daily.  We will send her a prescription for Carafate slurry 4 times daily.  · She has not quite  started using the Carafate.  · Continue Prilosec and Carafate  · PET/CT 4/11/2022 shows increased uptake in the proximal esophagus.  Likely secondary to GERD.  · Continue current medications    *Port not returning blood    · A new Mediport was placed by thoracic surgery 11/29/2021, Eliquis has been discontinued and right chest Mediport is now safe to use.    *Fatigue  · ?  Secondary to Keytruda  · Cortisol level today  · Fatigue unchanged  · Will consider starting prednisone at next visit      PLAN:  1. Proceed with Keytruda, hold Alimta  2. Radiation to the left adrenal gland and L1 vertebral body, appointment scheduled 6/1/2022  3. Continue PPI   4. Continue Zyprexa for anxiety and depression  5. 1 L normal saline today  6. Referral to nephrology  7. Hold PET/CT  8. Follow-up on MRI of the spine results  9. Check cortisol level  10. If fatigue remains unchanged and start prednisone with next Keytruda       Salma Snell MD

## 2022-06-10 ENCOUNTER — TELEPHONE (OUTPATIENT)
Dept: FAMILY MEDICINE CLINIC | Facility: CLINIC | Age: 57
End: 2022-06-10

## 2022-06-10 PROCEDURE — 77301 RADIOTHERAPY DOSE PLAN IMRT: CPT | Performed by: RADIOLOGY

## 2022-06-10 PROCEDURE — 77338 DESIGN MLC DEVICE FOR IMRT: CPT | Performed by: RADIOLOGY

## 2022-06-10 PROCEDURE — 77470 SPECIAL RADIATION TREATMENT: CPT | Performed by: RADIOLOGY

## 2022-06-10 PROCEDURE — 77293 RESPIRATOR MOTION MGMT SIMUL: CPT | Performed by: RADIOLOGY

## 2022-06-10 NOTE — TELEPHONE ENCOUNTER
[facility] oncologist is scheduling her an appointment for the kidney docs her most recent creatinine was improved and chemo on hold

## 2022-06-14 ENCOUNTER — TELEPHONE (OUTPATIENT)
Dept: ONCOLOGY | Facility: HOSPITAL | Age: 57
End: 2022-06-14

## 2022-06-14 ENCOUNTER — TELEPHONE (OUTPATIENT)
Dept: ONCOLOGY | Facility: CLINIC | Age: 57
End: 2022-06-14

## 2022-06-14 NOTE — TELEPHONE ENCOUNTER
----- Message from Brooklynn Powell RN sent at 6/13/2022  1:36 PM EDT -----  Her  called today, and one day last week as well.  We referred her to nephrology and as of this afternoon still have not heard from them.  Can you check on it?    Thank you!

## 2022-06-14 NOTE — TELEPHONE ENCOUNTER
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     Location of Distress Screening: [facility], Telephone     Distress Level: 8 (6/6/2022  3:00 PM)    Physical Concerns:    Appearance: Yes  Fatigue: Yes  Feeling swollen: Yes  Pain: Yes  Skin dry/itchy: Yes  Tingling hands/feet: Yes    Practical Problems:    Insurance/Financial: Yes    Emotional Concerns:    Depression: Yes  Nervousness: Yes  Sadness: Yes  Worry: Yes  Loss of Interest/Activities: Yes    Family Concerns:    Family health issues: Yes    Spiritual Concerns:        Interventions:   1. OSW followed up on [employer]IP[employer] process for patient's mother. Patient reported that Chayito Quezada LCSW provided information to apply however patient has not applied yet. OSW encouraged patient to apply sooner rather than later if she and her mother are interested because the wait can be up to 6 months and having this assistance can help relieve some of patient's stress. Patient agreed and verbalized understanding. OSW provided the contact number to [employer].   2. OSW to request [facility] Financial Aid Packet to be mailed to patient. Patient reported having multiple bills from [facility] that is causing financial distress. Patient plans to apply and return completed application. OSW explained that once the application is completed along with necessary documents, it would have to be approved before receiving any assistance. Patient verbalized understanding.   3. OSW offered to help patient secure therapy or support group should patient be interested. Patient declined at this time and stated that her family and medication help but that she believes the emotional  distress won't completely go away. OSW validated patient's feelings, discussed grief and coping skills (ie talking to loved ones).        Comments:    Patient has this OSW's contact information should needs arise. OSW encouraged patient to call any time.     DOMI Marrufo, CSW  Oncology Social Worker   Carl/Kerry

## 2022-06-24 PROCEDURE — 77293 RESPIRATOR MOTION MGMT SIMUL: CPT | Performed by: RADIOLOGY

## 2022-06-24 PROCEDURE — 77300 RADIATION THERAPY DOSE PLAN: CPT | Performed by: RADIOLOGY

## 2022-06-24 PROCEDURE — 77338 DESIGN MLC DEVICE FOR IMRT: CPT | Performed by: RADIOLOGY

## 2022-06-27 PROCEDURE — 77470 SPECIAL RADIATION TREATMENT: CPT | Performed by: RADIOLOGY

## 2022-06-28 ENCOUNTER — INFUSION (OUTPATIENT)
Dept: ONCOLOGY | Facility: HOSPITAL | Age: 57
End: 2022-06-28

## 2022-06-28 ENCOUNTER — OFFICE VISIT (OUTPATIENT)
Dept: ONCOLOGY | Facility: CLINIC | Age: 57
End: 2022-06-28

## 2022-06-28 VITALS
HEIGHT: 63 IN | SYSTOLIC BLOOD PRESSURE: 143 MMHG | OXYGEN SATURATION: 93 % | BODY MASS INDEX: 29.54 KG/M2 | RESPIRATION RATE: 16 BRPM | TEMPERATURE: 96.9 F | WEIGHT: 166.7 LBS | HEART RATE: 74 BPM | DIASTOLIC BLOOD PRESSURE: 89 MMHG

## 2022-06-28 DIAGNOSIS — Z79.899 HIGH RISK MEDICATION USE: ICD-10-CM

## 2022-06-28 DIAGNOSIS — C34.12 PRIMARY ADENOCARCINOMA OF UPPER LOBE OF LEFT LUNG: Primary | ICD-10-CM

## 2022-06-28 DIAGNOSIS — Z45.2 ENCOUNTER FOR FITTING AND ADJUSTMENT OF VASCULAR CATHETER: ICD-10-CM

## 2022-06-28 DIAGNOSIS — C79.70 NON-SMALL CELL LUNG CANCER METASTATIC TO ADRENAL GLAND: ICD-10-CM

## 2022-06-28 DIAGNOSIS — C34.90 NON-SMALL CELL LUNG CANCER METASTATIC TO ADRENAL GLAND: ICD-10-CM

## 2022-06-28 LAB
ALBUMIN SERPL-MCNC: 3.8 G/DL (ref 3.5–5.2)
ALBUMIN/GLOB SERPL: 1 G/DL (ref 1.1–2.4)
ALP SERPL-CCNC: 142 U/L (ref 38–116)
ALT SERPL W P-5'-P-CCNC: 9 U/L (ref 0–33)
ANION GAP SERPL CALCULATED.3IONS-SCNC: 12.6 MMOL/L (ref 5–15)
AST SERPL-CCNC: 15 U/L (ref 0–32)
BASOPHILS # BLD AUTO: 0.1 10*3/MM3 (ref 0–0.2)
BASOPHILS NFR BLD AUTO: 1.1 % (ref 0–1.5)
BILIRUB SERPL-MCNC: 0.2 MG/DL (ref 0.2–1.2)
BUN SERPL-MCNC: 16 MG/DL (ref 6–20)
BUN/CREAT SERPL: 10.5 (ref 7.3–30)
CALCIUM SPEC-SCNC: 9.4 MG/DL (ref 8.5–10.2)
CHLORIDE SERPL-SCNC: 106 MMOL/L (ref 98–107)
CO2 SERPL-SCNC: 23.4 MMOL/L (ref 22–29)
CREAT SERPL-MCNC: 1.52 MG/DL (ref 0.6–1.1)
DEPRECATED RDW RBC AUTO: 51.9 FL (ref 37–54)
EGFRCR SERPLBLD CKD-EPI 2021: 39.3 ML/MIN/1.73
EOSINOPHIL # BLD AUTO: 0.48 10*3/MM3 (ref 0–0.4)
EOSINOPHIL NFR BLD AUTO: 5.3 % (ref 0.3–6.2)
ERYTHROCYTE [DISTWIDTH] IN BLOOD BY AUTOMATED COUNT: 13.3 % (ref 12.3–15.4)
GLOBULIN UR ELPH-MCNC: 3.7 GM/DL (ref 1.8–3.5)
GLUCOSE SERPL-MCNC: 137 MG/DL (ref 74–124)
HCT VFR BLD AUTO: 34.2 % (ref 34–46.6)
HGB BLD-MCNC: 10.4 G/DL (ref 12–15.9)
IMM GRANULOCYTES # BLD AUTO: 0.05 10*3/MM3 (ref 0–0.05)
IMM GRANULOCYTES NFR BLD AUTO: 0.6 % (ref 0–0.5)
LYMPHOCYTES # BLD AUTO: 1 10*3/MM3 (ref 0.7–3.1)
LYMPHOCYTES NFR BLD AUTO: 11 % (ref 19.6–45.3)
MCH RBC QN AUTO: 32.2 PG (ref 26.6–33)
MCHC RBC AUTO-ENTMCNC: 30.4 G/DL (ref 31.5–35.7)
MCV RBC AUTO: 105.9 FL (ref 79–97)
MONOCYTES # BLD AUTO: 0.71 10*3/MM3 (ref 0.1–0.9)
MONOCYTES NFR BLD AUTO: 7.8 % (ref 5–12)
NEUTROPHILS NFR BLD AUTO: 6.75 10*3/MM3 (ref 1.7–7)
NEUTROPHILS NFR BLD AUTO: 74.2 % (ref 42.7–76)
NRBC BLD AUTO-RTO: 0 /100 WBC (ref 0–0.2)
PLATELET # BLD AUTO: 270 10*3/MM3 (ref 140–450)
PMV BLD AUTO: 8.8 FL (ref 6–12)
POTASSIUM SERPL-SCNC: 4 MMOL/L (ref 3.5–4.7)
PROT SERPL-MCNC: 7.5 G/DL (ref 6.3–8)
RAD ONC ARIA COURSE END DATE: NORMAL
RAD ONC ARIA COURSE END DATE: NORMAL
RAD ONC ARIA COURSE ID: NORMAL
RAD ONC ARIA COURSE ID: NORMAL
RAD ONC ARIA COURSE INTENT: NORMAL
RAD ONC ARIA COURSE INTENT: NORMAL
RAD ONC ARIA COURSE LAST TREATMENT DATE: NORMAL
RAD ONC ARIA COURSE LAST TREATMENT DATE: NORMAL
RAD ONC ARIA COURSE START DATE: NORMAL
RAD ONC ARIA COURSE START DATE: NORMAL
RAD ONC ARIA COURSE TREATMENT ELAPSED DAYS: 10
RAD ONC ARIA COURSE TREATMENT ELAPSED DAYS: 4
RAD ONC ARIA FIRST TREATMENT DATE: NORMAL
RAD ONC ARIA FIRST TREATMENT DATE: NORMAL
RAD ONC ARIA PLAN FRACTIONS TREATED TO DATE: 3
RAD ONC ARIA PLAN FRACTIONS TREATED TO DATE: 5
RAD ONC ARIA PLAN ID: NORMAL
RAD ONC ARIA PLAN ID: NORMAL
RAD ONC ARIA PLAN NAME: NORMAL
RAD ONC ARIA PLAN NAME: NORMAL
RAD ONC ARIA PLAN PRESCRIBED DOSE PER FRACTION: 7 GY
RAD ONC ARIA PLAN PRESCRIBED DOSE PER FRACTION: 9 GY
RAD ONC ARIA PLAN PRIMARY REFERENCE POINT: NORMAL
RAD ONC ARIA PLAN PRIMARY REFERENCE POINT: NORMAL
RAD ONC ARIA PLAN TOTAL FRACTIONS PRESCRIBED: 3
RAD ONC ARIA PLAN TOTAL FRACTIONS PRESCRIBED: 5
RAD ONC ARIA PLAN TOTAL PRESCRIBED DOSE: 2700 CGY
RAD ONC ARIA PLAN TOTAL PRESCRIBED DOSE: 3500 CGY
RAD ONC ARIA REFERENCE POINT DOSAGE GIVEN TO DATE: 14.09 GY
RAD ONC ARIA REFERENCE POINT DOSAGE GIVEN TO DATE: 27 GY
RAD ONC ARIA REFERENCE POINT DOSAGE GIVEN TO DATE: 35 GY
RAD ONC ARIA REFERENCE POINT DOSAGE GIVEN TO DATE: 35.45 GY
RAD ONC ARIA REFERENCE POINT ID: NORMAL
RBC # BLD AUTO: 3.23 10*6/MM3 (ref 3.77–5.28)
SODIUM SERPL-SCNC: 142 MMOL/L (ref 134–145)
T4 FREE SERPL-MCNC: 1.23 NG/DL (ref 0.93–1.7)
TSH SERPL DL<=0.05 MIU/L-ACNC: 1.94 UIU/ML (ref 0.27–4.2)
WBC NRBC COR # BLD: 9.09 10*3/MM3 (ref 3.4–10.8)

## 2022-06-28 PROCEDURE — 84439 ASSAY OF FREE THYROXINE: CPT | Performed by: NURSE PRACTITIONER

## 2022-06-28 PROCEDURE — 80053 COMPREHEN METABOLIC PANEL: CPT

## 2022-06-28 PROCEDURE — 96372 THER/PROPH/DIAG INJ SC/IM: CPT

## 2022-06-28 PROCEDURE — 25010000002 PEMBROLIZUMAB 100 MG/4ML SOLUTION 4 ML VIAL: Performed by: NURSE PRACTITIONER

## 2022-06-28 PROCEDURE — 84443 ASSAY THYROID STIM HORMONE: CPT | Performed by: NURSE PRACTITIONER

## 2022-06-28 PROCEDURE — 25010000002 HEPARIN LOCK FLUSH PER 10 UNITS: Performed by: INTERNAL MEDICINE

## 2022-06-28 PROCEDURE — 96413 CHEMO IV INFUSION 1 HR: CPT

## 2022-06-28 PROCEDURE — 25010000002 CYANOCOBALAMIN PER 1000 MCG: Performed by: NURSE PRACTITIONER

## 2022-06-28 PROCEDURE — 85025 COMPLETE CBC W/AUTO DIFF WBC: CPT

## 2022-06-28 PROCEDURE — 99215 OFFICE O/P EST HI 40 MIN: CPT | Performed by: NURSE PRACTITIONER

## 2022-06-28 RX ORDER — SODIUM CHLORIDE 0.9 % (FLUSH) 0.9 %
10 SYRINGE (ML) INJECTION AS NEEDED
Status: DISCONTINUED | OUTPATIENT
Start: 2022-06-28 | End: 2022-06-28 | Stop reason: HOSPADM

## 2022-06-28 RX ORDER — CYANOCOBALAMIN 1000 UG/ML
1000 INJECTION, SOLUTION INTRAMUSCULAR; SUBCUTANEOUS ONCE
Status: COMPLETED | OUTPATIENT
Start: 2022-06-28 | End: 2022-06-28

## 2022-06-28 RX ORDER — HEPARIN SODIUM (PORCINE) LOCK FLUSH IV SOLN 100 UNIT/ML 100 UNIT/ML
500 SOLUTION INTRAVENOUS AS NEEDED
Status: DISCONTINUED | OUTPATIENT
Start: 2022-06-28 | End: 2022-06-28 | Stop reason: HOSPADM

## 2022-06-28 RX ORDER — SODIUM CHLORIDE 0.9 % (FLUSH) 0.9 %
10 SYRINGE (ML) INJECTION AS NEEDED
Status: CANCELLED | OUTPATIENT
Start: 2022-06-28

## 2022-06-28 RX ORDER — SODIUM CHLORIDE 9 MG/ML
250 INJECTION, SOLUTION INTRAVENOUS ONCE
Status: CANCELLED | OUTPATIENT
Start: 2022-06-28

## 2022-06-28 RX ORDER — CYANOCOBALAMIN 1000 UG/ML
1000 INJECTION, SOLUTION INTRAMUSCULAR; SUBCUTANEOUS ONCE
Status: CANCELLED | OUTPATIENT
Start: 2022-06-28 | End: 2022-06-28

## 2022-06-28 RX ORDER — SODIUM CHLORIDE 9 MG/ML
250 INJECTION, SOLUTION INTRAVENOUS ONCE
Status: COMPLETED | OUTPATIENT
Start: 2022-06-28 | End: 2022-06-28

## 2022-06-28 RX ORDER — HEPARIN SODIUM (PORCINE) LOCK FLUSH IV SOLN 100 UNIT/ML 100 UNIT/ML
500 SOLUTION INTRAVENOUS AS NEEDED
Status: CANCELLED | OUTPATIENT
Start: 2022-06-28

## 2022-06-28 RX ADMIN — Medication 500 UNITS: at 14:23

## 2022-06-28 RX ADMIN — SODIUM CHLORIDE 200 MG: 9 INJECTION, SOLUTION INTRAVENOUS at 13:53

## 2022-06-28 RX ADMIN — Medication 10 ML: at 14:23

## 2022-06-28 RX ADMIN — CYANOCOBALAMIN 1000 MCG: 1000 INJECTION, SOLUTION INTRAMUSCULAR at 13:53

## 2022-06-28 RX ADMIN — SODIUM CHLORIDE 250 ML: 9 INJECTION, SOLUTION INTRAVENOUS at 13:53

## 2022-06-28 NOTE — PROGRESS NOTES
Subjective   Holli Patel is a 57 y.o. female.  Referred by Dr. Molina for bilateral breast cancer    History of Present Illness   Ms. 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    3/1/2021-bilateral screening mammogram-microcalcifications seen in the posterior one third of the lateral aspect of the right breast.  Area of focal asymmetry seen in the middle one third of the upper inner quadrant of the left breast.    3/1/2021-DEXA scan-T score of -2.2 on the lumbar spine and T score of -2.3 in the left hip and T score of -1.9 in the right hip.  Findings consistent with osteopenia    3/23/2021-screening lung CT-there is a 10 x 11 mm solid nodule in the left upper lobe.  Enlarged AP window lymph node measuring 14 x 10 mm.  Suggestion of a possible 9 mm left hilar lymph node.  Heavy coronary artery calcification.    3/23/2021-diagnostic mammogram bilateral-cluster of microcalcifications in the middle third lateral aspect of the right breast.  Left breast demonstrates persistence of the area of focal asymmetry in the region.    Ultrasound-left breast ultrasound at 10 o'clock position, 6 cm from the nipple there is a 0.4 cm irregular hypoechoic lesion.  Stereotactic biopsy of the right breast calcifications recommended.  Ultrasound-guided biopsy of the left breast lesion recommended    4/7/2021-right breast stereotactic biopsy and left breast ultrasound-guided biopsy  Pathology  Right breast-invasive ductal carcinoma, grade 2, lymphovascular invasion present, ER +99% strong, DC +80% moderate, HER-2 negative, Ki-67 12%  Left breast upper inner quadrant 10 o'clock position biopsy, invasive ductal carcinoma, grade   2, ER +99% strong, DC +40% strong, HER-2 2+ on immunohistochemistry, nonamplified on FISH Ki-67 10%    4/14/2021-PET/CT-FDG avid aortopulmonary window lymph node measures 0.9 cm with an SUV of 7.5.  FDG avid left hilar  lymph node measures 1 cm with an SUV of 17.2.  Irregular soft tissue density in the right breast measuring 3.7 x 3.8 cm is favored to be secondary to the recent breast biopsy.  1.1 cm pulmonary nodule within the left upper lobe with SUV 9.7 .  Sub-6 mm pulmonary nodules are present in the right lower lobe.  No evidence of lymphadenopathy or metastatic disease in the abdomen.  Focal area of FDG uptake within the central canal posterior to T11-T12 displaced demonstrating an SUV of 5.5 thought to be reactive however MRI is recommended for further evaluation.    She was seen by Dr. Molina who initially planned for breast surgery however  due to the PET abnormalities she has been referred to Dr. Kerr who plans to do a wound on 4/30/2021.  Dr. Molina's and Dr. Kerr's notes reviewed.    Patient denies any family history of breast cancer.  Her mother had lymphoma.  Maternal grandmother had colon cancer.  Prior to that screening mammogram she did not have any palpable abnormalities of either breast.    She has been a heavy smoker for about 40 years and smoked 2 packs/day.  Denies any recent weight changes, new bone pains, cough, abdominal pain nausea vomiting constipation or diarrhea.  She does have anxiety and has been on several medications.  She has recently been started on Xanax to help with the mood and also with insomnia.    Patient had a video-assisted thoracoscopy and mediastinal lymphadenectomy on 4/30/2021.  L5-L6 lymph nodes were biopsied however the small pulmonary nodule in the left upper lobe was not difficult to find.  Pathology showed moderately differentiated adenocarcinoma which is CK7 and TTF-1 positive.  Consistent with lung primary.  Ki-67 85%.    5/14/2021-MRI of the brain-minimal chronic small vessel ischemic change in the white matter.  Otherwise negative MRI.    5/20/2021-bilateral axillary ultrasound-no evidence of axillary lymphadenopathy in either axilla.  Normal-appearing bilateral axillary  lymph nodes visualized.    Cycle 1 cisplatin and Alimta on 5/25/2021.    Cycle 2 cisplatin Alimta 6/15/2021    Cycle 3 cisplatin Alimta 7/7/2021    Completed radiation on 7/12/2021    Port was nonfunctional hence a port study was performed which showed that the port was backed up into the innominate vein and also there was a nonocclusive thrombus at the end of the catheter extending into the superior vena cava.  She was started on anticoagulation with Eliquis.  It was recommended for port revision of the intention to keep the port.    PET/CT 8/5/2021-images independently reviewed and interpreted by me-decrease in size and FDG uptake of the left upper lobe pulmonary nodule as well as mediastinal and left hilar lymphadenopathy representing response to treatment.  Sub-6 mm pulmonary nodule in the left upper lobe new since 4/14/2021.  This could be related to radiation however follow-up CT in 3 months recommended.  Decrease in size of the irregular masslike tissue in the right breast which is postbiopsy hematoma.  This is not PET avid.  New segmental left eighth rib fractures healing.  A new band of sclerosis of the left seventh rib which favored to represent healing nondisplaced fracture.  Follow-up CT recommended.  focal uptake in the duodenum first and second portions.  Could be related to duodenitis.  Indeterminate lesion in the L1 vertebral body not well evaluated on PET/CT.    10/1/2021-MRI of the lumbar spine.  Lesion in the left posterior body of L1 measures 14 x 14 x 12 mm and previously 5 x 5 x 6 mm.  The interval enlargement strongly suggest that it is metastatic lesion.  No additional osseous metastasis noted.   Other chronic changes noted.    10/14/2021-biopsy of the lumbar spine lesion-pathology consistent with poorly differentiated carcinoma.  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    10/26/2021-brain MRI-no evidence of  metastatic disease.    10/26/2021-bilateral diagnostic mammogram and ultrasound  Scattered fibroglandular density in both breast.  Postbiopsy hematoma with internal biopsy clip in the upper outer quadrant of the right breast, 8 cm from the nipple.  The hematoma size is decreased to 2.9 cm from 3.6 cm earlier.    Left breast-parenchymal density measuring 9 x 10 mm has markedly decreased.  Few adjacent calcifications which are unchanged.  No new abnormality in the left breast.  At 10:00 region there is some minimal adjacent hypoechoic texture which is difficult to measure but appears smaller since the previous exam.    10/28/2021-PET/CT  New L1 and L2 lytic bone metastasis annually intensely hypermetabolic focus at the left adrenal gland likely represents metastatic disease as well.  Slight decrease in size of the 0.9 x 0.7 cm left upper lobe pulmonary nodule.  There is hypermetabolic activity related to radiation pneumonitis.  The remainder of the nodule is photopenic.  Uncertain etiology of the more intense activity along the right lateral peripheral margin of the 2.6 cm postbiopsy density of the right breast.    She completed radiation to to the lumbar spine on 11/19/2021 11/22/2021-cycle 1 Alimta and Keytruda    3/23/2022-bilateral diagnostic mammogram and ultrasound  Complete resolution of the microcalcifications in the upper outer quadrant of the right breast and decrease in size of the postbiopsy hematoma.  No suspicious abnormalities in the right breast.  Benign-appearing calcifications at the site of malignancy in the left breast upper inner quadrant.  No other suspicious findings.    4/11/2022 PET/CT-there is new groundglass opacities in the left upper lobe which are most likely postradiation changes.  Other groundglass opacities in the left lung as well as the right lung remained stable.  Increased nodularity of the left adrenal gland with an SUV of 5.6, previously 3.5.    Increased uptake in the L1  vertebra in the right posterior aspect with an SUV of 3.6.  Left L1 sclerosis increased    Due to this the case was discussed in multidisciplinary conference.  The disease progression was significant in the left adrenal gland as well as the L1 vertebra.  Since there were only 2 areas of disease progression it has been decided to proceed with radiation to these 2 spots and continue on Keytruda and Alimta.    Interval history:  Holli Patel is a 57 y.o. female with the above-mentioned history returns to the office today for scheduled follow-up.  Patient did recently undergo repeat MRI of the lumbar spine 6/13/2022.  This was requested per radiation oncology to better assess disease progression and potential benefit with radiation.  Patient is going to stop by radiation oncology today to make follow-up appointment now that this imaging has been completed.  Therefore as it stands right now she does not have any possible radiation scheduled.    Patient also has had worsening of her kidney function.  We initially held treatment as of 5/31/2022 and creatinine bumped up to 2.0, BUN 23.  We then resumed therapy 6/11/2022 with Keytruda alone.  Patient was referred to nephrology.    As she is now seen back in the office accompanied by her  and they state that she has new patient appointment with nephrology next week, 7/7/2022.  Kidney function does continue to slowly improve, creatinine 1.5, GFR 40.  She is already scheduled for repeat PET scan in 2 weeks.  We discussed continuing with just Keytruda alone for now until she is seen by nephrology.  Of note she has also been holding her lisinopril in case this is contributing to her worsening renal function.    Patient does have ongoing back pain though she is not fully convinced this is even related to mild progression of her disease.  She has some chronic back pain issues.  She does take occasional Percocet.    Patient also notes continued difficulty with  constipation.  She did previously take senna S though has not taken this in recent days.  We discussed that she likely needs to be taking 2 tabs nightly to stay regular.  She also has milk of magnesia which we discussed she could add during the day.    She and her  deny other concerns at this time.    The following portions of the patient's history were reviewed and updated as appropriate: allergies, current medications, past family history, past medical history, past social history, past surgical history and problem list.    Past Medical History:   Diagnosis Date   • Anxiety    • Clot     POSSIBLE BLOOD CLOT IN VENOUS ACCESS DEVICE-PT STATES WAS STARTED ON ELIQUIS    • Dyspnea on exertion    • Elevated cholesterol    • Frequent urination     DAY AND NIGHT   • SHAYY (generalized anxiety disorder)     RELATED HIGH BLOOD PRESSURE   • GERD (gastroesophageal reflux disease)    • High blood pressure     ANXIETY RELATED   • Hyperlipidemia    • Hypothyroidism    • Lung cancer (HCC)    • Lung nodule     LEFT   • Malignant neoplasm of upper-outer quadrant of right breast in female, estrogen receptor positive (HCC) 4/13/2021    PT STATES HAS BILAT BREAST CANCER   • Migraine    • PONV (postoperative nausea and vomiting)         Past Surgical History:   Procedure Laterality Date   • BREAST BIOPSY Bilateral 04/07/2021    Right Breast 10 o'clock & Left breast 10 o'clock 6 cm from nipple, [facility]   • CLOSED REDUCTION HIP DISLOCATION Left 4/30/2021    Procedure: BRONCHOSCOPY, LEFT VIDEO ASSITED THORACOSCOPY, ROBOTIC ASSSITED MEDIASTINAL LYMPHADENECTOMY WITH INTERCOSTAL NERVE BLOCKS;  Surgeon: Raphael Kerr III, MD;  Location: [facility];  Service: DaVinci;  Laterality: Left;   • COLONOSCOPY     • TUBAL ABDOMINAL LIGATION     • VENOUS ACCESS DEVICE (PORT) INSERTION Right 5/20/2021    Procedure: INSERTION VENOUS ACCESS DEVICE;  Surgeon: Raphael Kerr III, MD;  Location: [facility] OR;  Service: Thoracic;  Laterality:  Right;   • VENOUS ACCESS DEVICE (PORT) INSERTION Right 2021    Procedure: REVISION AND REPLACEMENT RIGHT SUBCLAVIAN VENOUS ACCESS PORT;  Surgeon: Raphael Kerr III, MD;  Location: [facility] OR;  Service: Thoracic;  Laterality: Right;   • WRIST SURGERY Right         Family History   Problem Relation Age of Onset   • Cancer Father         LYMPHATIC   • Prostate cancer Father    • Lymphoma Father    • Alcohol abuse Brother    • Colon cancer Maternal Grandmother    • Malig Hyperthermia Neg Hx         Social History     Socioeconomic History   • Marital status:      Spouse name: Jelani   Tobacco Use   • Smoking status: Former Smoker     Packs/day: 1.00     Years: 30.00     Pack years: 30.00     Types: Electronic Cigarette, Cigarettes     Start date: 1981     Quit date:      Years since quittin.4   • Smokeless tobacco: Never Used   • Tobacco comment: ABT 15 CIGARETTES DAILY   Vaping Use   • Vaping Use: Some days   • Substances: Nicotine, Flavoring   • Devices: Disposable   Substance and Sexual Activity   • Alcohol use: Never   • Drug use: Never   • Sexual activity: Yes     Partners: Male        OB History        2    Para   2    Term   2            AB        Living           SAB        IAB        Ectopic        Molar        Multiple        Live Births                Age of menarche-10  Age at menopause-42  Oral contraceptive pill use-15 years  No HRT use  She has 2 children  Age at first live childbirth-17    No Known Allergies     Review of Systems   Review of systems as mentioned in the HPI    Objective   not currently breastfeeding.       Physical Exam  Vitals reviewed.   HENT:      Head: Normocephalic.   Eyes:      Pupils: Pupils are equal, round, and reactive to light.   Cardiovascular:      Rate and Rhythm: Normal rate and regular rhythm.      Pulses: Normal pulses.      Heart sounds: Normal heart sounds.   Pulmonary:      Effort: Pulmonary effort is normal.      Breath  sounds: Normal breath sounds.   Abdominal:      General: Abdomen is flat.   Musculoskeletal:         General: No swelling. Normal range of motion.      Cervical back: Normal range of motion.   Skin:     General: Skin is warm.   Neurological:      General: No focal deficit present.      Mental Status: She is alert and oriented to person, place, and time.   Psychiatric:         Mood and Affect: Mood normal.         Behavior: Behavior normal.       I have reexamined the patient and the results are consistent with the previously documented exam. Marly Calhoun, APRN     Results from last 7 days   Lab Units 06/28/22  1239   WBC 10*3/mm3 9.09   NEUTROS ABS 10*3/mm3 6.75   HEMOGLOBIN g/dL 10.4*   HEMATOCRIT % 34.2   PLATELETS 10*3/mm3 270     Results from last 7 days   Lab Units 06/28/22  1239   SODIUM mmol/L 142   POTASSIUM mmol/L 4.0   CHLORIDE mmol/L 106   CO2 mmol/L 23.4   BUN mg/dL 16   CREATININE mg/dL 1.52*   CALCIUM mg/dL 9.4   ALBUMIN g/dL 3.80   BILIRUBIN mg/dL 0.2   ALK PHOS U/L 142*   ALT (SGPT) U/L 9   AST (SGOT) U/L 15   GLUCOSE mg/dL 137*              No radiology results for the last 30 days.    6/7/2022  CBC reviewed and hemoglobin improved at 8.8, WBC and platelet count normal  CMP-BUN 17, creatinine 1.71, LFTs normal    Assessment/Plan      *Bilateral breast cancer  · Invasive ductal carcinoma in both right and left breast and lesions measure under 1 cm.  No palpable lymphadenopathy although unsure if this has been evaluated by an axillary ultrasound.  No abnormal axillary lymphadenopathy noted on PET.  · Most likely clinical T1b N0 M0, both cancers were noted to be grade 2, ER/NH positive and HER-2 negative and Ki-67 less than 15%  VATS on 4/30/2021  Biopsy confirms adenocarcinoma of pulmonary primary  Discussed with Dr. Molina about delaying breast surgery and she is in agreement  Darted on neoadjuvant anastrozole  · Patient experienced significant adverse effects with anastrozole  · Treatment  changed to letrozole   · Axillary ultrasound 4/20/2021 -negative for any axillary lymphadenopathy  · Started letrozole in May 2021  · She continues on letrozole without any problems.  · 8/24/2021 bilateral diagnostic mammogram and ultrasound.  In the left breast the tumor now measures 1 cm on mammogram as opposed to 0.8 cm in March.  Right breast calcifications also noted to be in a large extent although the number seems to be decreased.  Hematoma has decreased in size.  · 10/26/2021-bilateral diagnostic mammogram and ultrasound-decrease in size of the bilateral breast lesions.  · 10/26/2021-PET/CT-metastatic lesions in the left adrenal gland, L1-L2.  · 10/26/2021-MRI of the brain-no evidence of metastatic disease  · 3/23/2022-bilateral diagnostic mammogram and ultrasound show complete resolution of the tumors.  · Continue letrozole, good response to treatment.  · Unchanged    *Adenocarcinoma of the left lung, metastatic   · Most likely stage T1 N2 M0, stage III  · MRI of the thoracic spine ordered to further evaluate the abnormality seen on PET  · MRI of the brain negative   · Given that she is only 55 and fairly good performance status I discussed with her by with concurrent chemoradiation with cisplatin and Alimta every 3 weeks followed by durvalumab.  · Adverse effects including but not limited to myelosuppression, increased risk of infections, nausea, vomiting, renal dysfunction, ototoxicity, neurotoxicity have been discussed at length.  · MRI of the thoracic spine performed 5/24/2021 shows no evidence of metastatic disease in the thoracic spine however in L1 there is a 7 mm lesion which is indeterminate.  A lumbar spine MRI has been recommended.  · Discussed her case with Dr. Oates and he feels like MRI of the lumbar spine may also show that this lesion is indeterminate.  · Discussed the same with the patient and her .  · Even if this is metastatic disease this might be the only lesion of metastatic  disease and then would consider this oligometastatic disease.  · Therefore plan to proceed with concurrent chemoradiation as scheduled.  · cisplatin and Alimta, C1D1 5/25/2021  · Day 1 of radiation 5/25/2021  · 5/27/2021-MRI of the lumbar spine-there is a 6 x 5 x 6 mm enhancing lesion in the left posterior body of L1 vertebra.  This is considered indeterminate.  Follow-up recommended.  Degenerative changes noted at L5-S1  · 7/7/2021, cycle 3 cisplatin Alimta  · 7/12/2021-radiation completed  · PET/CT 8/6/2021 with good response to chemoradiation.  · MRI of the lumbar spine 10/1/2021 with increase in size of the L1 lesion which now measures 14 mm as opposed to 6 mm in May 2021.  · 10/14/2021-biopsy of the L1 lesion and pathology consistent with adenocarcinoma of the lung, TPS 0  · 0/26/2021-PET/CT-metastatic lesions in the left adrenal gland, L1-L2.  · 10/26/2021-MRI of the brain-no evidence of metastatic disease  · Given that she only has metastatic disease in L1-L2 and left adrenal gland I think it would be possible to radiate all these regions.  · Completed radiation to L1-L2 on 11/19/2021  · 11/22/2021-cycle 1 of Keytruda and Alimta  · Completed radiation to the left adrenal gland  · 1/18/2022-PET/CT-images independently reviewed and interpreted by me.  Decrease in size of the 7 mm pulmonary nodule in the left upper lobe which previously measured 9 mm.  Radiation pneumonitis is nearly resolved.  Hypermetabolic activity at the L1-L2 metastasis has resolved.  No new suspicious metastatic disease.  · Guardant 360 circulating DNA level decreasing.  · 3/7/2022-patient is complaining of fluid retention.  · Thought to be secondary to Alimta and started on steroids and Lasix.  · Developed CHASITY on Lasix since Lasix discontinued.  · Alimta dose has been reduced.   · PET/CT 4/11/2022 concerning for disease progression in the left adrenal gland as well as L1 vertebra on the left.  · Case presented and discussed at the  thoracic multidisciplinary conference.  The plan is to proceed with Keytruda and Alimta and radiation to the left adrenal gland and the L1 vertebra.  · She will be referred back to radiation oncology, she had to reschedule her appointment due to COVID-19 infection in the family.    · Evaluated by radiation oncology on 6/1/2022 and plan is to proceed with L1 and adrenal radiation.  MRI of the lumbar spine performed 6/6/2022  · Read pending  · 6/7/22: GFR still low; hold Alimta but proceed with Keytruda   · 6/28/22: Patient reviewed today.  Recent MRI of the lumbar spine reviewed with patient/.  This does show concerning area at L1 as well as concerning enlarging left adrenal gland nodule.  This is consistent with prior imaging.  This MRI was obtained specifically to help radiation oncology decide if additional radiation is possible.  Patient will follow up with radiation oncology later today.  In the meantime we will continue on with therapy, specifically Keytruda alone.  Patient's GFR is improving however still reduced.  Patient will see nephrology next week.  She will have PET scan in 2 weeks and see Dr. Snell back in 3 weeks for review.    *Fluid retention  · Secondary to Alimta  · Alimta being held  · Discontinue Lasix due to CHASITY    *CHASITY  · Most likely secondary to Lasix  · Discontinue Lasix  · 1 L normal saline 3/28/2022  · Kidney function improved, Creatinine 1.69 and BUN 23.  Encourage patient to increase oral intake.  Discouraged intake of caffeinated beverages.  Patient to return in 1 week for labs only around the time of her schedule PET scan appointment.  · 4/19/2022-creatinine 1.36.  · 5/10/2022-creatinine elevated at 1.96.  · 5/31/2022 creatinine elevated at 2.01  · 6/7/2022-creatinine 1.76. Alimta held, continue Keytruda  · 6/28/2022 creatinine 1.5, GFR 40.  Patient will see nephrology for new patient consultation 7/7/2022.  Continue to hold Alimta for now.    *Anemia  · Hemoglobin 7.9    · Symptomatic  · 2 units of PRBC  · Hgb improved to 9.8 today   · 4/11/2022 hemoglobin stable at 8.8  · 5/31/2022-hemoglobin lower at 8.4  · 6/7/2022-hemoglobin improved at 8.8  · 6/28/2022-hemoglobin improved up to 10.4 (likely related to holding Alimta)    *Bilateral breast cancer-no family history of breast cancer but given synchronous breast cancer genetic testing has been sent.  Genetic testing with a variant of unknown significance.    *Right hand numbness and difficulty with grasping objects  · Previously had history of carpal tunnel requiring repair.  · She is currently on letrozole which could cause worsening of the carpal tunnel  · Gabapentin dose will be increased to 600 mg 3 times daily  · She will also be referred to hand surgery for further evaluation.  · She now reports bilateral upper extremity numbness.  · Continue gabapentin  · MRI of the brain 10/26/2021 without any evidence of metastatic disease  · Not an issue today    *Anxiety-  · Currently on Xanax 1 mg nightly  · She required Valium for the MRI as she had claustrophobia and a panic attack  · Continue Zyprexa.  · Unchanged.  Stable.    *Depression  · Continue Zyprexa  · Related to cancer diagnosis and current situation where she is having to care for her elderly mother.  · Reports being extremely tired.  Continues to struggle with depression.  · Stable today.    *Left-sided chest wall pain-secondary to VATS.  Most likely neuropathic.  Continue gabapentin and Norco as needed.  She ran out of gabapentin and experienced leg cramps.    Continue gabapentin  Improved    *Hypertension-  · Lisinopril discontinued due to CHASITY per above.    · Blood pressure today 143/89.  Patient will discuss safe antihypertensive with nephrology and new consultation next week.      *Smoking-not addressed today    *GERD  · 7/7/2021, patient complains of reflux despite the use of Prilosec 20 mg twice daily.  We will send her a prescription for Carafate slurry 4 times  daily.  · She has not quite started using the Carafate.  · Continue Prilosec and Carafate  · PET/CT 4/11/2022 shows increased uptake in the proximal esophagus.  Likely secondary to GERD.  · Continue current medications    *Port not returning blood    · A new Mediport was placed by thoracic surgery 11/29/2021, Eliquis discontinued and right chest Mediport is now safe to use.    *Fatigue  · ?  Secondary to Keytruda  · Cortisol level today  · Fatigue unchanged, stable.      PLAN:  1. Proceed with Keytruda, again holding Alimta for now.  2. Patient has undergone MRI of the lumbar spine with noted activity at L1 and left adrenal gland.  She plans to make appointment with radiation oncology later today to discuss if she can possibly receive further radiation to these areas.    3. Patient will see nephrology for new patient evaluation on 7/7/2022.    4. Continue PPI   5. Continue Zyprexa for anxiety and depression  6. Patient undergo repeat PET scan in 2 weeks.  7. Follow-up with Dr. Snell in 3 weeks with review of PET scan and potential continuation of Keytruda and possible resumption malevolent pending nephrology recommendations and overall renal function improvement.    I spent 52 minutes caring for Holli on this date of service. This time includes time spent by me in the following activities: preparing for the visit, reviewing tests, obtaining and/or reviewing a separately obtained history, performing a medically appropriate examination and/or evaluation, counseling and educating the patient/family/caregiver, ordering medications, tests, or procedures, referring and communicating with other health care professionals, documenting information in the medical record, independently interpreting results and communicating that information with the patient/family/caregiver and care coordination         Marly Calhoun, KORI

## 2022-06-30 ENCOUNTER — RADIATION ONCOLOGY WEEKLY ASSESSMENT (OUTPATIENT)
Dept: RADIATION ONCOLOGY | Facility: HOSPITAL | Age: 57
End: 2022-06-30

## 2022-06-30 DIAGNOSIS — C34.90 NON-SMALL CELL LUNG CANCER METASTATIC TO BONE: Primary | ICD-10-CM

## 2022-06-30 DIAGNOSIS — C79.51 NON-SMALL CELL LUNG CANCER METASTATIC TO BONE: Primary | ICD-10-CM

## 2022-06-30 DIAGNOSIS — C34.90 NON-SMALL CELL LUNG CANCER METASTATIC TO ADRENAL GLAND: ICD-10-CM

## 2022-06-30 DIAGNOSIS — C79.70 NON-SMALL CELL LUNG CANCER METASTATIC TO ADRENAL GLAND: ICD-10-CM

## 2022-06-30 LAB
RAD ONC ARIA COURSE ID: NORMAL
RAD ONC ARIA COURSE LAST TREATMENT DATE: NORMAL
RAD ONC ARIA COURSE START DATE: NORMAL
RAD ONC ARIA COURSE TREATMENT ELAPSED DAYS: 0
RAD ONC ARIA FIRST TREATMENT DATE: NORMAL
RAD ONC ARIA PLAN FRACTIONS TREATED TO DATE: 1
RAD ONC ARIA PLAN ID: NORMAL
RAD ONC ARIA PLAN PRESCRIBED DOSE PER FRACTION: 6 GY
RAD ONC ARIA PLAN PRIMARY REFERENCE POINT: NORMAL
RAD ONC ARIA PLAN TOTAL FRACTIONS PRESCRIBED: 5
RAD ONC ARIA PLAN TOTAL PRESCRIBED DOSE: 3000 CGY
RAD ONC ARIA REFERENCE POINT DOSAGE GIVEN TO DATE: 6 GY
RAD ONC ARIA REFERENCE POINT DOSAGE GIVEN TO DATE: 6.88 GY
RAD ONC ARIA REFERENCE POINT ID: NORMAL
RAD ONC ARIA REFERENCE POINT ID: NORMAL
RAD ONC ARIA REFERENCE POINT SESSION DOSAGE GIVEN: 6 GY
RAD ONC ARIA REFERENCE POINT SESSION DOSAGE GIVEN: 6.88 GY

## 2022-06-30 PROCEDURE — 77427 RADIATION TX MANAGEMENT X5: CPT | Performed by: RADIOLOGY

## 2022-06-30 PROCEDURE — 77014 CHG CT GUIDANCE RADIATION THERAPY FLDS PLACEMENT: CPT | Performed by: RADIOLOGY

## 2022-06-30 PROCEDURE — 77386 CHG INTENSITY MODULATED RADIATION TX DLVR COMPLEX: CPT | Performed by: RADIOLOGY

## 2022-06-30 PROCEDURE — 77386: CPT | Performed by: RADIOLOGY

## 2022-06-30 NOTE — PROGRESS NOTES
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    Treatment Number and Dose: No chief complaint on file.   fx 1/5 L1 sbrt 600cGy/3000    Notes on treatment course, films, medical progress and plan:    Patient was placed in treatment position and CBCT images were acquired for target localization. Adjustments were made to ensure the set up agreed with the computerized stereotactic plan. All images were approved prior to treatment starting.     The patient did well, tolerated treatment without difficulty. No problems or questions.     Subjective     I was personally present at the machine for the set up and delivery of the above treatment. The Medical Physicist was also in attendance during patient set up, verification and treatment delivery to ensure the parameters agreed with the treatment plan.     I provided direct supervision of the patient's set up, treatment parameters and image guidance. I provided corrections and approval of the above prior to the treatment, in real time. I was present for any adjustments required due to patient motion, target movement or equipment issues.    The ongoing images for localization, tumor tracking, gating and beam's eye view localization images will also be approved.     Problem added:  None  Medications added:   None  Ancillary referrals made: None    I have reviewed the current medications, allergies and problem list in the patients EMR.

## 2022-07-01 ENCOUNTER — RADIATION ONCOLOGY WEEKLY ASSESSMENT (OUTPATIENT)
Dept: RADIATION ONCOLOGY | Facility: HOSPITAL | Age: 57
End: 2022-07-01

## 2022-07-01 ENCOUNTER — APPOINTMENT (OUTPATIENT)
Dept: RADIATION ONCOLOGY | Facility: HOSPITAL | Age: 57
End: 2022-07-01

## 2022-07-01 DIAGNOSIS — C79.70 NON-SMALL CELL LUNG CANCER METASTATIC TO ADRENAL GLAND: Primary | ICD-10-CM

## 2022-07-01 DIAGNOSIS — C34.90 NON-SMALL CELL LUNG CANCER METASTATIC TO ADRENAL GLAND: Primary | ICD-10-CM

## 2022-07-01 LAB
RAD ONC ARIA COURSE ID: NORMAL
RAD ONC ARIA COURSE INTENT: NORMAL
RAD ONC ARIA COURSE LAST TREATMENT DATE: NORMAL
RAD ONC ARIA COURSE START DATE: NORMAL
RAD ONC ARIA COURSE TREATMENT ELAPSED DAYS: 0
RAD ONC ARIA FIRST TREATMENT DATE: NORMAL
RAD ONC ARIA PLAN FRACTIONS TREATED TO DATE: 1
RAD ONC ARIA PLAN ID: NORMAL
RAD ONC ARIA PLAN PRESCRIBED DOSE PER FRACTION: 6 GY
RAD ONC ARIA PLAN PRIMARY REFERENCE POINT: NORMAL
RAD ONC ARIA PLAN TOTAL FRACTIONS PRESCRIBED: 5
RAD ONC ARIA PLAN TOTAL PRESCRIBED DOSE: 3000 CGY
RAD ONC ARIA REFERENCE POINT DOSAGE GIVEN TO DATE: 6 GY
RAD ONC ARIA REFERENCE POINT DOSAGE GIVEN TO DATE: 6.97 GY
RAD ONC ARIA REFERENCE POINT ID: NORMAL
RAD ONC ARIA REFERENCE POINT ID: NORMAL
RAD ONC ARIA REFERENCE POINT SESSION DOSAGE GIVEN: 6 GY
RAD ONC ARIA REFERENCE POINT SESSION DOSAGE GIVEN: 6.97 GY

## 2022-07-01 PROCEDURE — 77014 CHG CT GUIDANCE RADIATION THERAPY FLDS PLACEMENT: CPT | Performed by: RADIOLOGY

## 2022-07-01 PROCEDURE — FACE2FACE: Performed by: RADIOLOGY

## 2022-07-01 PROCEDURE — 77386: CPT | Performed by: RADIOLOGY

## 2022-07-01 PROCEDURE — 77373 STRTCTC BDY RAD THER TX DLVR: CPT | Performed by: RADIOLOGY

## 2022-07-01 PROCEDURE — 77386 CHG INTENSITY MODULATED RADIATION TX DLVR COMPLEX: CPT | Performed by: RADIOLOGY

## 2022-07-01 NOTE — PROGRESS NOTES
Physician Stereotactic Management Note    Diagnosis:  Left Adrenal Met    Doing well pretreatment, no problems.    Treatment Number and Dose:   Fx 1/5 Left Adrenal Gland Retreatment   SBRT 600cGy/3000    Notes on treatment course, films, medical progress and plan:    Patient was placed in treatment position and CBCT images were acquired for target localization. Adjustments were made to ensure the set up agreed with the computerized stereotactic plan. All images were approved prior to treatment starting.     The patient did well, tolerated treatment without difficulty. No problems or questions.     Subjective     I was personally present at the machine for the set up and delivery of the above treatment. The Medical Physicist was also in attendance during patient set up, verification and treatment delivery to ensure the parameters agreed with the treatment plan.     I provided direct supervision of the patient's set up, treatment parameters and image guidance. I provided corrections and approval of the above prior to the treatment, in real time. I was present for any adjustments required due to patient motion, target movement or equipment issues.    The ongoing images for localization, tumor tracking, gating and beam's eye view localization images will also be approved.     Problem added:  None  Medications added:   None  Ancillary referrals made: None    I have reviewed the current medications, allergies and problem list in the patients EMR.

## 2022-07-05 ENCOUNTER — RADIATION ONCOLOGY WEEKLY ASSESSMENT (OUTPATIENT)
Dept: RADIATION ONCOLOGY | Facility: HOSPITAL | Age: 57
End: 2022-07-05

## 2022-07-05 DIAGNOSIS — C79.51 NON-SMALL CELL LUNG CANCER METASTATIC TO BONE: Primary | ICD-10-CM

## 2022-07-05 DIAGNOSIS — C34.90 NON-SMALL CELL LUNG CANCER METASTATIC TO BONE: Primary | ICD-10-CM

## 2022-07-05 LAB
RAD ONC ARIA COURSE ID: NORMAL
RAD ONC ARIA COURSE LAST TREATMENT DATE: NORMAL
RAD ONC ARIA COURSE START DATE: NORMAL
RAD ONC ARIA COURSE TREATMENT ELAPSED DAYS: 5
RAD ONC ARIA FIRST TREATMENT DATE: NORMAL
RAD ONC ARIA PLAN FRACTIONS TREATED TO DATE: 2
RAD ONC ARIA PLAN ID: NORMAL
RAD ONC ARIA PLAN PRESCRIBED DOSE PER FRACTION: 6 GY
RAD ONC ARIA PLAN PRIMARY REFERENCE POINT: NORMAL
RAD ONC ARIA PLAN TOTAL FRACTIONS PRESCRIBED: 5
RAD ONC ARIA PLAN TOTAL PRESCRIBED DOSE: 3000 CGY
RAD ONC ARIA REFERENCE POINT DOSAGE GIVEN TO DATE: 12 GY
RAD ONC ARIA REFERENCE POINT DOSAGE GIVEN TO DATE: 13.77 GY
RAD ONC ARIA REFERENCE POINT ID: NORMAL
RAD ONC ARIA REFERENCE POINT ID: NORMAL
RAD ONC ARIA REFERENCE POINT SESSION DOSAGE GIVEN: 6 GY
RAD ONC ARIA REFERENCE POINT SESSION DOSAGE GIVEN: 6.88 GY

## 2022-07-05 PROCEDURE — 77014 CHG CT GUIDANCE RADIATION THERAPY FLDS PLACEMENT: CPT | Performed by: RADIOLOGY

## 2022-07-05 PROCEDURE — 77336 RADIATION PHYSICS CONSULT: CPT | Performed by: RADIOLOGY

## 2022-07-05 PROCEDURE — FACE2FACE: Performed by: RADIOLOGY

## 2022-07-05 PROCEDURE — 77386 CHG INTENSITY MODULATED RADIATION TX DLVR COMPLEX: CPT | Performed by: RADIOLOGY

## 2022-07-05 PROCEDURE — 77386: CPT | Performed by: RADIOLOGY

## 2022-07-05 NOTE — PROGRESS NOTES
Physician Stereotactic Management Note    Diagnosis:   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)    Doing well pretreatment, no problems.    Treatment Number and Dose:  fx 1/5 L2 sbrt 1200cGy/3000    Notes on treatment course, films, medical progress and plan:    Patient was placed in treatment position and CBCT images were acquired for target localization. Adjustments were made to ensure the set up agreed with the computerized stereotactic plan. All images were approved prior to treatment starting.     The patient did well, tolerated treatment without difficulty. No problems or questions.     Subjective     I was personally present at the machine for the set up and delivery of the above treatment. The Medical Physicist was also in attendance during patient set up, verification and treatment delivery to ensure the parameters agreed with the treatment plan.     I provided direct supervision of the patient's set up, treatment parameters and image guidance. I provided corrections and approval of the above prior to the treatment, in real time. I was present for any adjustments required due to patient motion, target movement or equipment issues.    The ongoing images for localization, tumor tracking, gating and beam's eye view localization images will also be approved.     Problem added:  None  Medications added:   None  Ancillary referrals made: None    I have reviewed the current medications, allergies and problem list in the patients EMR.

## 2022-07-06 ENCOUNTER — RADIATION ONCOLOGY WEEKLY ASSESSMENT (OUTPATIENT)
Dept: RADIATION ONCOLOGY | Facility: HOSPITAL | Age: 57
End: 2022-07-06

## 2022-07-06 VITALS
HEART RATE: 67 BPM | BODY MASS INDEX: 29.48 KG/M2 | SYSTOLIC BLOOD PRESSURE: 141 MMHG | WEIGHT: 166.4 LBS | OXYGEN SATURATION: 95 % | DIASTOLIC BLOOD PRESSURE: 95 MMHG

## 2022-07-06 DIAGNOSIS — C34.90 NON-SMALL CELL LUNG CANCER METASTATIC TO BONE: Primary | ICD-10-CM

## 2022-07-06 DIAGNOSIS — C79.51 NON-SMALL CELL LUNG CANCER METASTATIC TO BONE: Primary | ICD-10-CM

## 2022-07-06 LAB
RAD ONC ARIA COURSE ID: NORMAL
RAD ONC ARIA COURSE INTENT: NORMAL
RAD ONC ARIA COURSE LAST TREATMENT DATE: NORMAL
RAD ONC ARIA COURSE START DATE: NORMAL
RAD ONC ARIA COURSE TREATMENT ELAPSED DAYS: 5
RAD ONC ARIA FIRST TREATMENT DATE: NORMAL
RAD ONC ARIA PLAN FRACTIONS TREATED TO DATE: 2
RAD ONC ARIA PLAN ID: NORMAL
RAD ONC ARIA PLAN PRESCRIBED DOSE PER FRACTION: 6 GY
RAD ONC ARIA PLAN PRIMARY REFERENCE POINT: NORMAL
RAD ONC ARIA PLAN TOTAL FRACTIONS PRESCRIBED: 5
RAD ONC ARIA PLAN TOTAL PRESCRIBED DOSE: 3000 CGY
RAD ONC ARIA REFERENCE POINT DOSAGE GIVEN TO DATE: 12 GY
RAD ONC ARIA REFERENCE POINT DOSAGE GIVEN TO DATE: 13.95 GY
RAD ONC ARIA REFERENCE POINT ID: NORMAL
RAD ONC ARIA REFERENCE POINT ID: NORMAL
RAD ONC ARIA REFERENCE POINT SESSION DOSAGE GIVEN: 6 GY
RAD ONC ARIA REFERENCE POINT SESSION DOSAGE GIVEN: 6.97 GY

## 2022-07-06 PROCEDURE — 77386 CHG INTENSITY MODULATED RADIATION TX DLVR COMPLEX: CPT | Performed by: RADIOLOGY

## 2022-07-06 PROCEDURE — 77386: CPT | Performed by: RADIOLOGY

## 2022-07-06 PROCEDURE — 77014 CHG CT GUIDANCE RADIATION THERAPY FLDS PLACEMENT: CPT | Performed by: RADIOLOGY

## 2022-07-06 NOTE — PROGRESS NOTES
Physician Stereotactic Management Note    Diagnosis:   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)    Doing well pretreatment, no problems.    Treatment Number and Dose: No chief complaint on file.   fx 2/5 left adrenal gland sbrt, 12Gy/30Gy    Notes on treatment course, films, medical progress and plan:    Patient was placed in treatment position and CBCT images were acquired for target localization. Adjustments were made to ensure the set up agreed with the computerized stereotactic plan. All images were approved prior to treatment starting.     The patient did well, tolerated treatment without difficulty. No problems or questions.     Subjective     I was personally present at the machine for the set up and delivery of the above treatment. The Medical Physicist was also in attendance during patient set up, verification and treatment delivery to ensure the parameters agreed with the treatment plan.     I provided direct supervision of the patient's set up, treatment parameters and image guidance. I provided corrections and approval of the above prior to the treatment, in real time. I was present for any adjustments required due to patient motion, target movement or equipment issues.    The ongoing images for localization, tumor tracking, gating and beam's eye view localization images will also be approved.     Problem added:  None  Medications added:   None  Ancillary referrals made: None    I have reviewed the current medications, allergies and problem list in the patients EMR.

## 2022-07-07 ENCOUNTER — RADIATION ONCOLOGY WEEKLY ASSESSMENT (OUTPATIENT)
Dept: RADIATION ONCOLOGY | Facility: HOSPITAL | Age: 57
End: 2022-07-07

## 2022-07-07 DIAGNOSIS — C34.90 NON-SMALL CELL LUNG CANCER METASTATIC TO BONE: Primary | ICD-10-CM

## 2022-07-07 DIAGNOSIS — C79.51 NON-SMALL CELL LUNG CANCER METASTATIC TO BONE: Primary | ICD-10-CM

## 2022-07-07 LAB
RAD ONC ARIA COURSE ID: NORMAL
RAD ONC ARIA COURSE LAST TREATMENT DATE: NORMAL
RAD ONC ARIA COURSE START DATE: NORMAL
RAD ONC ARIA COURSE TREATMENT ELAPSED DAYS: 7
RAD ONC ARIA FIRST TREATMENT DATE: NORMAL
RAD ONC ARIA PLAN FRACTIONS TREATED TO DATE: 3
RAD ONC ARIA PLAN ID: NORMAL
RAD ONC ARIA PLAN PRESCRIBED DOSE PER FRACTION: 6 GY
RAD ONC ARIA PLAN PRIMARY REFERENCE POINT: NORMAL
RAD ONC ARIA PLAN TOTAL FRACTIONS PRESCRIBED: 5
RAD ONC ARIA PLAN TOTAL PRESCRIBED DOSE: 3000 CGY
RAD ONC ARIA REFERENCE POINT DOSAGE GIVEN TO DATE: 18 GY
RAD ONC ARIA REFERENCE POINT DOSAGE GIVEN TO DATE: 20.65 GY
RAD ONC ARIA REFERENCE POINT ID: NORMAL
RAD ONC ARIA REFERENCE POINT ID: NORMAL
RAD ONC ARIA REFERENCE POINT SESSION DOSAGE GIVEN: 6 GY
RAD ONC ARIA REFERENCE POINT SESSION DOSAGE GIVEN: 6.88 GY

## 2022-07-07 PROCEDURE — 77386: CPT | Performed by: RADIOLOGY

## 2022-07-07 PROCEDURE — 77386 CHG INTENSITY MODULATED RADIATION TX DLVR COMPLEX: CPT | Performed by: RADIOLOGY

## 2022-07-07 PROCEDURE — 77014 CHG CT GUIDANCE RADIATION THERAPY FLDS PLACEMENT: CPT | Performed by: RADIOLOGY

## 2022-07-07 NOTE — PROGRESS NOTES
Physician Stereotactic Management Note    Diagnosis:   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)    Doing well pretreatment, no problems.    Treatment Number and Dose: No chief complaint on file.   fx 3/5 sbrt to L1 18/30Gy    Notes on treatment course, films, medical progress and plan:    Patient was placed in treatment position and CBCT images were acquired for target localization. Adjustments were made to ensure the set up agreed with the computerized stereotactic plan. All images were approved prior to treatment starting.     The patient did well, tolerated treatment without difficulty. No problems or questions.     Subjective     I was personally present at the machine for the set up and delivery of the above treatment. The Medical Physicist was also in attendance during patient set up, verification and treatment delivery to ensure the parameters agreed with the treatment plan.     I provided direct supervision of the patient's set up, treatment parameters and image guidance. I provided corrections and approval of the above prior to the treatment, in real time. I was present for any adjustments required due to patient motion, target movement or equipment issues.    The ongoing images for localization, tumor tracking, gating and beam's eye view localization images will also be approved.     Problem added:  None  Medications added:   None  Ancillary referrals made: None    I have reviewed the current medications, allergies and problem list in the patients EMR.

## 2022-07-08 ENCOUNTER — TRANSCRIBE ORDERS (OUTPATIENT)
Dept: ADMINISTRATIVE | Facility: HOSPITAL | Age: 57
End: 2022-07-08

## 2022-07-08 ENCOUNTER — RADIATION ONCOLOGY WEEKLY ASSESSMENT (OUTPATIENT)
Dept: RADIATION ONCOLOGY | Facility: HOSPITAL | Age: 57
End: 2022-07-08

## 2022-07-08 DIAGNOSIS — C34.90 NON-SMALL CELL LUNG CANCER METASTATIC TO ADRENAL GLAND: Primary | ICD-10-CM

## 2022-07-08 DIAGNOSIS — N17.9 ACUTE KIDNEY INJURY: Primary | ICD-10-CM

## 2022-07-08 DIAGNOSIS — C79.70 NON-SMALL CELL LUNG CANCER METASTATIC TO ADRENAL GLAND: Primary | ICD-10-CM

## 2022-07-08 LAB
RAD ONC ARIA COURSE ID: NORMAL
RAD ONC ARIA COURSE INTENT: NORMAL
RAD ONC ARIA COURSE LAST TREATMENT DATE: NORMAL
RAD ONC ARIA COURSE START DATE: NORMAL
RAD ONC ARIA COURSE TREATMENT ELAPSED DAYS: 7
RAD ONC ARIA FIRST TREATMENT DATE: NORMAL
RAD ONC ARIA PLAN FRACTIONS TREATED TO DATE: 3
RAD ONC ARIA PLAN ID: NORMAL
RAD ONC ARIA PLAN PRESCRIBED DOSE PER FRACTION: 6 GY
RAD ONC ARIA PLAN PRIMARY REFERENCE POINT: NORMAL
RAD ONC ARIA PLAN TOTAL FRACTIONS PRESCRIBED: 5
RAD ONC ARIA PLAN TOTAL PRESCRIBED DOSE: 3000 CGY
RAD ONC ARIA REFERENCE POINT DOSAGE GIVEN TO DATE: 18 GY
RAD ONC ARIA REFERENCE POINT DOSAGE GIVEN TO DATE: 20.92 GY
RAD ONC ARIA REFERENCE POINT ID: NORMAL
RAD ONC ARIA REFERENCE POINT ID: NORMAL
RAD ONC ARIA REFERENCE POINT SESSION DOSAGE GIVEN: 6 GY
RAD ONC ARIA REFERENCE POINT SESSION DOSAGE GIVEN: 6.97 GY

## 2022-07-08 PROCEDURE — 77386: CPT | Performed by: RADIOLOGY

## 2022-07-08 PROCEDURE — 77427 RADIATION TX MANAGEMENT X5: CPT | Performed by: RADIOLOGY

## 2022-07-08 PROCEDURE — 77386 CHG INTENSITY MODULATED RADIATION TX DLVR COMPLEX: CPT | Performed by: RADIOLOGY

## 2022-07-08 PROCEDURE — 77014 CHG CT GUIDANCE RADIATION THERAPY FLDS PLACEMENT: CPT | Performed by: RADIOLOGY

## 2022-07-08 NOTE — PROGRESS NOTES
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    Treatment Number and Dose: No chief complaint on file.   fx 3/5 sbrt left adrenal gland 18/30Gy    Notes on treatment course, films, medical progress and plan:    Patient was placed in treatment position and CBCT images were acquired for target localization. Adjustments were made to ensure the set up agreed with the computerized stereotactic plan. All images were approved prior to treatment starting.     The patient did well, tolerated treatment without difficulty. No problems or questions.     Subjective     I was personally present at the machine for the set up and delivery of the above treatment. The Medical Physicist was also in attendance during patient set up, verification and treatment delivery to ensure the parameters agreed with the treatment plan.     I provided direct supervision of the patient's set up, treatment parameters and image guidance. I provided corrections and approval of the above prior to the treatment, in real time. I was present for any adjustments required due to patient motion, target movement or equipment issues.    The ongoing images for localization, tumor tracking, gating and beam's eye view localization images will also be approved.     Problem added:  None  Medications added:   None  Ancillary referrals made: None    I have reviewed the current medications, allergies and problem list in the patients EMR.

## 2022-07-11 ENCOUNTER — RADIATION ONCOLOGY WEEKLY ASSESSMENT (OUTPATIENT)
Dept: RADIATION ONCOLOGY | Facility: HOSPITAL | Age: 57
End: 2022-07-11

## 2022-07-11 DIAGNOSIS — C34.90 NON-SMALL CELL LUNG CANCER METASTATIC TO ADRENAL GLAND: Primary | ICD-10-CM

## 2022-07-11 DIAGNOSIS — C79.70 NON-SMALL CELL LUNG CANCER METASTATIC TO ADRENAL GLAND: Primary | ICD-10-CM

## 2022-07-11 DIAGNOSIS — C79.51 NON-SMALL CELL LUNG CANCER METASTATIC TO BONE: ICD-10-CM

## 2022-07-11 DIAGNOSIS — C34.90 NON-SMALL CELL LUNG CANCER METASTATIC TO BONE: ICD-10-CM

## 2022-07-11 LAB
RAD ONC ARIA COURSE ID: NORMAL
RAD ONC ARIA COURSE LAST TREATMENT DATE: NORMAL
RAD ONC ARIA COURSE START DATE: NORMAL
RAD ONC ARIA COURSE TREATMENT ELAPSED DAYS: 11
RAD ONC ARIA FIRST TREATMENT DATE: NORMAL
RAD ONC ARIA PLAN FRACTIONS TREATED TO DATE: 4
RAD ONC ARIA PLAN ID: NORMAL
RAD ONC ARIA PLAN PRESCRIBED DOSE PER FRACTION: 6 GY
RAD ONC ARIA PLAN PRIMARY REFERENCE POINT: NORMAL
RAD ONC ARIA PLAN TOTAL FRACTIONS PRESCRIBED: 5
RAD ONC ARIA PLAN TOTAL PRESCRIBED DOSE: 3000 CGY
RAD ONC ARIA REFERENCE POINT DOSAGE GIVEN TO DATE: 24 GY
RAD ONC ARIA REFERENCE POINT DOSAGE GIVEN TO DATE: 27.53 GY
RAD ONC ARIA REFERENCE POINT ID: NORMAL
RAD ONC ARIA REFERENCE POINT ID: NORMAL
RAD ONC ARIA REFERENCE POINT SESSION DOSAGE GIVEN: 6 GY
RAD ONC ARIA REFERENCE POINT SESSION DOSAGE GIVEN: 6.88 GY

## 2022-07-11 PROCEDURE — 77386: CPT | Performed by: RADIOLOGY

## 2022-07-11 PROCEDURE — 77386 CHG INTENSITY MODULATED RADIATION TX DLVR COMPLEX: CPT | Performed by: RADIOLOGY

## 2022-07-11 PROCEDURE — 77014 CHG CT GUIDANCE RADIATION THERAPY FLDS PLACEMENT: CPT | Performed by: RADIOLOGY

## 2022-07-11 NOTE — PROGRESS NOTES
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    Treatment Number and Dose: No chief complaint on file.   SBRT fx 4/5 L1 retreat 24/30Gy today    Notes on treatment course, films, medical progress and plan:    Patient was placed in treatment position and CBCT images were acquired for target localization. Adjustments were made to ensure the set up agreed with the computerized stereotactic plan. All images were approved prior to treatment starting.     The patient did well, tolerated treatment without difficulty. No problems or questions.     Subjective     I was personally present at the machine for the set up and delivery of the above treatment. The Medical Physicist was also in attendance during patient set up, verification and treatment delivery to ensure the parameters agreed with the treatment plan.     I provided direct supervision of the patient's set up, treatment parameters and image guidance. I provided corrections and approval of the above prior to the treatment, in real time. I was present for any adjustments required due to patient motion, target movement or equipment issues.    The ongoing images for localization, tumor tracking, gating and beam's eye view localization images will also be approved.     Problem added:  None  Medications added:   None  Ancillary referrals made: None    I have reviewed the current medications, allergies and problem list in the patients EMR.

## 2022-07-12 ENCOUNTER — RADIATION ONCOLOGY WEEKLY ASSESSMENT (OUTPATIENT)
Dept: RADIATION ONCOLOGY | Facility: HOSPITAL | Age: 57
End: 2022-07-12

## 2022-07-12 ENCOUNTER — TELEPHONE (OUTPATIENT)
Dept: ONCOLOGY | Facility: CLINIC | Age: 57
End: 2022-07-12

## 2022-07-12 VITALS
DIASTOLIC BLOOD PRESSURE: 90 MMHG | HEART RATE: 75 BPM | OXYGEN SATURATION: 97 % | BODY MASS INDEX: 29.7 KG/M2 | WEIGHT: 167.6 LBS | SYSTOLIC BLOOD PRESSURE: 140 MMHG

## 2022-07-12 DIAGNOSIS — C79.70 NON-SMALL CELL LUNG CANCER METASTATIC TO ADRENAL GLAND: Primary | ICD-10-CM

## 2022-07-12 DIAGNOSIS — C34.90 NON-SMALL CELL LUNG CANCER METASTATIC TO ADRENAL GLAND: Primary | ICD-10-CM

## 2022-07-12 LAB
RAD ONC ARIA COURSE ID: NORMAL
RAD ONC ARIA COURSE INTENT: NORMAL
RAD ONC ARIA COURSE LAST TREATMENT DATE: NORMAL
RAD ONC ARIA COURSE START DATE: NORMAL
RAD ONC ARIA COURSE TREATMENT ELAPSED DAYS: 11
RAD ONC ARIA FIRST TREATMENT DATE: NORMAL
RAD ONC ARIA PLAN FRACTIONS TREATED TO DATE: 4
RAD ONC ARIA PLAN ID: NORMAL
RAD ONC ARIA PLAN PRESCRIBED DOSE PER FRACTION: 6 GY
RAD ONC ARIA PLAN PRIMARY REFERENCE POINT: NORMAL
RAD ONC ARIA PLAN TOTAL FRACTIONS PRESCRIBED: 5
RAD ONC ARIA PLAN TOTAL PRESCRIBED DOSE: 3000 CGY
RAD ONC ARIA REFERENCE POINT DOSAGE GIVEN TO DATE: 24 GY
RAD ONC ARIA REFERENCE POINT DOSAGE GIVEN TO DATE: 27.89 GY
RAD ONC ARIA REFERENCE POINT ID: NORMAL
RAD ONC ARIA REFERENCE POINT ID: NORMAL
RAD ONC ARIA REFERENCE POINT SESSION DOSAGE GIVEN: 6 GY
RAD ONC ARIA REFERENCE POINT SESSION DOSAGE GIVEN: 6.97 GY

## 2022-07-12 PROCEDURE — 77014 CHG CT GUIDANCE RADIATION THERAPY FLDS PLACEMENT: CPT | Performed by: RADIOLOGY

## 2022-07-12 PROCEDURE — 77386 CHG INTENSITY MODULATED RADIATION TX DLVR COMPLEX: CPT | Performed by: RADIOLOGY

## 2022-07-12 PROCEDURE — 77386: CPT | Performed by: RADIOLOGY

## 2022-07-12 NOTE — PROGRESS NOTES
Physician Weekly Management Note    Diagnosis:     Diagnosis Plan   1. 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   Yes  New medications given?   No  Problemlist reviewed?   Yes  Problem added?   No  Issues raised requiring referral to support services - task assigned to:  tanja    Technical aspects reviewed:  Weekly OBI approved?   Yes  Weekly port films approved?   Yes  Change requests noted on port film?   No  Patient setup and plan reviewed?   Yes    Chart Reviewed:  Continue current treatment plan?   Yes  Treatment plan change requested?   No  CBC reviewed?   Yes  Concurrent Chemo?   No    Objective     Toxicities:   none     Review of Systems   Constitutional: Negative.    Gastrointestinal: Negative.    Musculoskeletal: Negative.           There were no vitals filed for this visit.    Current Status 6/28/2022   ECOG score 0       Physical Exam  Constitutional:       Appearance: Normal appearance.   Neurological:      Mental Status: She is alert.   Psychiatric:         Mood and Affect: Mood normal.         Behavior: Behavior normal.             Problem Summary List    Diagnosis:     Diagnosis Plan   1. 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)       Pathology:  Metastatic non small cell lung cancer    Past Medical History:   Diagnosis Date   • Anxiety    • Clot     POSSIBLE BLOOD CLOT IN VENOUS ACCESS DEVICE-PT STATES WAS STARTED ON ELIQUIS    • Dyspnea on exertion    • Elevated cholesterol    • Frequent urination     DAY AND NIGHT   • SHAYY (generalized anxiety disorder)     RELATED HIGH BLOOD PRESSURE   • GERD (gastroesophageal reflux disease)    • High blood pressure     ANXIETY RELATED   •  Hyperlipidemia    • Hypothyroidism    • Lung cancer (HCC)    • Lung nodule     LEFT   • Malignant neoplasm of upper-outer quadrant of right breast in female, estrogen receptor positive (HCC) 4/13/2021    PT STATES HAS BILAT BREAST CANCER   • Migraine    • PONV (postoperative nausea and vomiting)          Past Surgical History:   Procedure Laterality Date   • BREAST BIOPSY Bilateral 04/07/2021    Right Breast 10 o'clock & Left breast 10 o'clock 6 cm from nipple, [facility]   • CLOSED REDUCTION HIP DISLOCATION Left 4/30/2021    Procedure: BRONCHOSCOPY, LEFT VIDEO ASSITED THORACOSCOPY, ROBOTIC ASSSITED MEDIASTINAL LYMPHADENECTOMY WITH INTERCOSTAL NERVE BLOCKS;  Surgeon: Raphael Kerr III, MD;  Location: [facility] OR;  Service: DaVinci;  Laterality: Left;   • COLONOSCOPY     • TUBAL ABDOMINAL LIGATION     • VENOUS ACCESS DEVICE (PORT) INSERTION Right 5/20/2021    Procedure: INSERTION VENOUS ACCESS DEVICE;  Surgeon: Raphael Kerr III, MD;  Location: [facility] OR;  Service: Thoracic;  Laterality: Right;   • VENOUS ACCESS DEVICE (PORT) INSERTION Right 11/29/2021    Procedure: REVISION AND REPLACEMENT RIGHT SUBCLAVIAN VENOUS ACCESS PORT;  Surgeon: Raphael Kerr III, MD;  Location: [facility] OR;  Service: Thoracic;  Laterality: Right;   • WRIST SURGERY Right          Current Outpatient Medications on File Prior to Visit   Medication Sig Dispense Refill   • ALPRAZolam (XANAX) 0.5 MG tablet Take 1 tablet by mouth 2 (Two) Times a Day As Needed for Anxiety. 60 tablet 2   • buPROPion XL (WELLBUTRIN XL) 300 MG 24 hr tablet Take 1 tablet by mouth Daily. 30 tablet 5   • FLUoxetine (PROzac) 40 MG capsule TAKE ONE CAPSULE BY MOUTH DAILY 90 capsule 1   • folic acid (FOLVITE) 1 MG tablet Take 1 tablet by mouth Daily. 30 tablet 3   • gabapentin (NEURONTIN) 300 MG capsule TAKE TWO CAPSULES BY MOUTH THREE TIMES A  capsule 3   • letrozole (FEMARA) 2.5 MG tablet Take 1 tablet by mouth Daily. 90 tablet 3   • levothyroxine  (SYNTHROID, LEVOTHROID) 25 MCG tablet Take 1 tablet by mouth Daily. 90 tablet 1   • OLANZapine (zyPREXA) 5 MG tablet TAKE ONE TABLET BY MOUTH ONCE NIGHTLY 30 tablet 1   • omeprazole (PrilOSEC) 20 MG capsule Take 1 capsule by mouth 2 (Two) Times a Day. 180 capsule 1   • ondansetron (ZOFRAN) 8 MG tablet Take 1 tablet by mouth 3 (Three) Times a Day As Needed for Nausea or Vomiting. 30 tablet 5   • oxyCODONE-acetaminophen (Percocet) 5-325 MG per tablet Take 1 tablet by mouth Every 6 (Six) Hours As Needed for Moderate Pain . 120 tablet 0   • propranolol LA (Inderal LA) 60 MG 24 hr capsule Take 1 capsule by mouth Daily. 90 capsule 1   • rizatriptan (MAXALT) 10 MG tablet Take 1 tablet by mouth 1 (One) Time for 1 dose. AT ONSET OF HEADACHE MAY REPEAT ONE TABLET IN 2 HOURS IF NEEDED 12 tablet 5   • simvastatin (ZOCOR) 20 MG tablet Take 1 tablet by mouth Daily. 90 tablet 1     No current facility-administered medications on file prior to visit.       No Known Allergies      Referring Provider:    No referring provider defined for this encounter.    Oncologist:  No primary care provider on file.      Seen and approved by:  Patty Franklin MD  07/12/2022

## 2022-07-12 NOTE — TELEPHONE ENCOUNTER
----- Message from Brooklynn Davis sent at 7/12/2022  8:46 AM EDT -----  Regarding: No show PET

## 2022-07-12 NOTE — PROGRESS NOTES
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    Treatment Number and Dose: No chief complaint on file.   fx 4/5 sbrt left adrenal gland 24/30gy    Notes on treatment course, films, medical progress and plan:    Patient was placed in treatment position and CBCT images were acquired for target localization. Adjustments were made to ensure the set up agreed with the computerized stereotactic plan. All images were approved prior to treatment starting.     The patient did well, tolerated treatment without difficulty. No problems or questions.     Subjective     I was personally present at the machine for the set up and delivery of the above treatment. The Medical Physicist was also in attendance during patient set up, verification and treatment delivery to ensure the parameters agreed with the treatment plan.     I provided direct supervision of the patient's set up, treatment parameters and image guidance. I provided corrections and approval of the above prior to the treatment, in real time. I was present for any adjustments required due to patient motion, target movement or equipment issues.    The ongoing images for localization, tumor tracking, gating and beam's eye view localization images will also be approved.     Problem added:  None  Medications added:   None  Ancillary referrals made: None    I have reviewed the current medications, allergies and problem list in the patients EMR.

## 2022-07-13 ENCOUNTER — RADIATION ONCOLOGY WEEKLY ASSESSMENT (OUTPATIENT)
Dept: RADIATION ONCOLOGY | Facility: HOSPITAL | Age: 57
End: 2022-07-13

## 2022-07-13 DIAGNOSIS — C34.90 NON-SMALL CELL LUNG CANCER METASTATIC TO BONE: Primary | ICD-10-CM

## 2022-07-13 DIAGNOSIS — C79.51 NON-SMALL CELL LUNG CANCER METASTATIC TO BONE: Primary | ICD-10-CM

## 2022-07-13 LAB
RAD ONC ARIA COURSE ID: NORMAL
RAD ONC ARIA COURSE LAST TREATMENT DATE: NORMAL
RAD ONC ARIA COURSE START DATE: NORMAL
RAD ONC ARIA COURSE TREATMENT ELAPSED DAYS: 13
RAD ONC ARIA FIRST TREATMENT DATE: NORMAL
RAD ONC ARIA PLAN FRACTIONS TREATED TO DATE: 5
RAD ONC ARIA PLAN ID: NORMAL
RAD ONC ARIA PLAN PRESCRIBED DOSE PER FRACTION: 6 GY
RAD ONC ARIA PLAN PRIMARY REFERENCE POINT: NORMAL
RAD ONC ARIA PLAN TOTAL FRACTIONS PRESCRIBED: 5
RAD ONC ARIA PLAN TOTAL PRESCRIBED DOSE: 3000 CGY
RAD ONC ARIA REFERENCE POINT DOSAGE GIVEN TO DATE: 30 GY
RAD ONC ARIA REFERENCE POINT DOSAGE GIVEN TO DATE: 34.42 GY
RAD ONC ARIA REFERENCE POINT ID: NORMAL
RAD ONC ARIA REFERENCE POINT ID: NORMAL
RAD ONC ARIA REFERENCE POINT SESSION DOSAGE GIVEN: 6 GY
RAD ONC ARIA REFERENCE POINT SESSION DOSAGE GIVEN: 6.88 GY

## 2022-07-13 PROCEDURE — 77386 CHG INTENSITY MODULATED RADIATION TX DLVR COMPLEX: CPT | Performed by: RADIOLOGY

## 2022-07-13 PROCEDURE — 77386: CPT | Performed by: RADIOLOGY

## 2022-07-13 PROCEDURE — 77014 CHG CT GUIDANCE RADIATION THERAPY FLDS PLACEMENT: CPT | Performed by: RADIOLOGY

## 2022-07-13 NOTE — PROGRESS NOTES
Physician Stereotactic Management Note    Diagnosis:   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)    Doing well pretreatment, no problems.    Treatment Number and Dose: No chief complaint on file.   fx 5/5 L spine 30/30Gy kps 100%    Notes on treatment course, films, medical progress and plan:    Patient was placed in treatment position and CBCT images were acquired for target localization. Adjustments were made to ensure the set up agreed with the computerized stereotactic plan. All images were approved prior to treatment starting.     The patient did well, tolerated treatment without difficulty. No problems or questions.     Subjective     I was personally present at the machine for the set up and delivery of the above treatment. The Medical Physicist was also in attendance during patient set up, verification and treatment delivery to ensure the parameters agreed with the treatment plan.     I provided direct supervision of the patient's set up, treatment parameters and image guidance. I provided corrections and approval of the above prior to the treatment, in real time. I was present for any adjustments required due to patient motion, target movement or equipment issues.    The ongoing images for localization, tumor tracking, gating and beam's eye view localization images will also be approved.     Problem added:  None  Medications added:   None  Ancillary referrals made: None    I have reviewed the current medications, allergies and problem list in the patients EMR.

## 2022-07-14 ENCOUNTER — RADIATION ONCOLOGY WEEKLY ASSESSMENT (OUTPATIENT)
Dept: RADIATION ONCOLOGY | Facility: HOSPITAL | Age: 57
End: 2022-07-14

## 2022-07-14 VITALS
RESPIRATION RATE: 20 BRPM | SYSTOLIC BLOOD PRESSURE: 136 MMHG | BODY MASS INDEX: 29.55 KG/M2 | OXYGEN SATURATION: 95 % | DIASTOLIC BLOOD PRESSURE: 94 MMHG | HEART RATE: 66 BPM | WEIGHT: 166.8 LBS

## 2022-07-14 DIAGNOSIS — C34.90 NON-SMALL CELL LUNG CANCER METASTATIC TO BONE: Primary | ICD-10-CM

## 2022-07-14 DIAGNOSIS — C79.51 NON-SMALL CELL LUNG CANCER METASTATIC TO BONE: Primary | ICD-10-CM

## 2022-07-14 LAB
RAD ONC ARIA COURSE END DATE: NORMAL
RAD ONC ARIA COURSE END DATE: NORMAL
RAD ONC ARIA COURSE ID: NORMAL
RAD ONC ARIA COURSE INTENT: NORMAL
RAD ONC ARIA COURSE INTENT: NORMAL
RAD ONC ARIA COURSE LAST TREATMENT DATE: NORMAL
RAD ONC ARIA COURSE START DATE: NORMAL
RAD ONC ARIA COURSE TREATMENT ELAPSED DAYS: 13
RAD ONC ARIA FIRST TREATMENT DATE: NORMAL
RAD ONC ARIA PLAN FRACTIONS TREATED TO DATE: 5
RAD ONC ARIA PLAN ID: NORMAL
RAD ONC ARIA PLAN NAME: NORMAL
RAD ONC ARIA PLAN NAME: NORMAL
RAD ONC ARIA PLAN PRESCRIBED DOSE PER FRACTION: 6 GY
RAD ONC ARIA PLAN PRIMARY REFERENCE POINT: NORMAL
RAD ONC ARIA PLAN TOTAL FRACTIONS PRESCRIBED: 5
RAD ONC ARIA PLAN TOTAL PRESCRIBED DOSE: 3000 CGY
RAD ONC ARIA REFERENCE POINT DOSAGE GIVEN TO DATE: 30 GY
RAD ONC ARIA REFERENCE POINT DOSAGE GIVEN TO DATE: 34.42 GY
RAD ONC ARIA REFERENCE POINT DOSAGE GIVEN TO DATE: 34.86 GY
RAD ONC ARIA REFERENCE POINT DOSAGE GIVEN TO DATE: 34.86 GY
RAD ONC ARIA REFERENCE POINT ID: NORMAL
RAD ONC ARIA REFERENCE POINT SESSION DOSAGE GIVEN: 6 GY
RAD ONC ARIA REFERENCE POINT SESSION DOSAGE GIVEN: 6.97 GY

## 2022-07-14 PROCEDURE — 77014 CHG CT GUIDANCE RADIATION THERAPY FLDS PLACEMENT: CPT | Performed by: RADIOLOGY

## 2022-07-14 PROCEDURE — 77386 CHG INTENSITY MODULATED RADIATION TX DLVR COMPLEX: CPT | Performed by: RADIOLOGY

## 2022-07-14 PROCEDURE — 77336 RADIATION PHYSICS CONSULT: CPT | Performed by: RADIOLOGY

## 2022-07-14 PROCEDURE — 77386: CPT | Performed by: RADIOLOGY

## 2022-07-14 NOTE — PROGRESS NOTES
Physician Weekly Management Note    Diagnosis:     Diagnosis Plan   1. 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)         RT Details:  fx 5/5 lumbar spine sbrt completed yesterday 30/30 gy. Today would be 5/5 left adrenal gland however machine is down, at this point she has received 4/5 fx to left adrenal 24/30GHGy sbrt    Notes on Treatment course, Films, Medical progress:  Doing well, mild pain in her low back right side, no abdominal cramping or diarrhea, complete today or tomorrow depending on when machines get back up, shweta rivero 4 weeks    Weekly Management:  Medication reviewed?   Yes  New medications given?   No  Problemlist reviewed?   Yes  Problem added?   No  Issues raised requiring referral to support services - task assigned to:  na    Technical aspects reviewed:  Weekly OBI approved?   Yes  Weekly port films approved?   Yes  Change requests noted on port film?   No  Patient setup and plan reviewed?   Yes    Chart Reviewed:  Continue current treatment plan?   Yes  Treatment plan change requested?   No  CBC reviewed?   Yes  Concurrent Chemo?   No    Objective     Toxicities:   none     Review of Systems   Constitutional: Negative.    Gastrointestinal: Negative.    Musculoskeletal: Positive for back pain.          Vitals:    07/14/22 1122   BP: 136/94   Pulse: 66   Resp: 20   SpO2: 95%       Current Status 6/28/2022   ECOG score 0       Physical Exam  Constitutional:       Appearance: Normal appearance.   Neurological:      Mental Status: She is alert.   Psychiatric:         Mood and Affect: Mood normal.         Thought Content: Thought content normal.             Problem Summary List    Diagnosis:     Diagnosis Plan   1. 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)       Pathology:   Metastatic lung cancer    Past Medical History:   Diagnosis Date   • Anxiety    • Clot     POSSIBLE BLOOD CLOT IN VENOUS ACCESS DEVICE-PT STATES WAS STARTED ON ELIQUIS    • Dyspnea on exertion    • Elevated  cholesterol    • Frequent urination     DAY AND NIGHT   • SHAYY (generalized anxiety disorder)     RELATED HIGH BLOOD PRESSURE   • GERD (gastroesophageal reflux disease)    • High blood pressure     ANXIETY RELATED   • Hyperlipidemia    • Hypothyroidism    • Lung cancer (HCC)    • Lung nodule     LEFT   • Malignant neoplasm of upper-outer quadrant of right breast in female, estrogen receptor positive (HCC) 4/13/2021    PT STATES HAS BILAT BREAST CANCER   • Migraine    • PONV (postoperative nausea and vomiting)          Past Surgical History:   Procedure Laterality Date   • BREAST BIOPSY Bilateral 04/07/2021    Right Breast 10 o'clock & Left breast 10 o'clock 6 cm from nipple, [facility]   • CLOSED REDUCTION HIP DISLOCATION Left 4/30/2021    Procedure: BRONCHOSCOPY, LEFT VIDEO ASSITED THORACOSCOPY, ROBOTIC ASSSITED MEDIASTINAL LYMPHADENECTOMY WITH INTERCOSTAL NERVE BLOCKS;  Surgeon: Raphael Kerr III, MD;  Location: [facility];  Service: DaVinci;  Laterality: Left;   • COLONOSCOPY     • TUBAL ABDOMINAL LIGATION     • VENOUS ACCESS DEVICE (PORT) INSERTION Right 5/20/2021    Procedure: INSERTION VENOUS ACCESS DEVICE;  Surgeon: Raphael Kerr III, MD;  Location: [facility] OR;  Service: Thoracic;  Laterality: Right;   • VENOUS ACCESS DEVICE (PORT) INSERTION Right 11/29/2021    Procedure: REVISION AND REPLACEMENT RIGHT SUBCLAVIAN VENOUS ACCESS PORT;  Surgeon: Raphael Kerr III, MD;  Location: [facility];  Service: Thoracic;  Laterality: Right;   • WRIST SURGERY Right          Current Outpatient Medications on File Prior to Visit   Medication Sig Dispense Refill   • ALPRAZolam (XANAX) 0.5 MG tablet Take 1 tablet by mouth 2 (Two) Times a Day As Needed for Anxiety. 60 tablet 2   • buPROPion XL (WELLBUTRIN XL) 300 MG 24 hr tablet Take 1 tablet by mouth Daily. 30 tablet 5   • FLUoxetine (PROzac) 40 MG capsule TAKE ONE CAPSULE BY MOUTH DAILY 90 capsule 1   • folic acid (FOLVITE) 1 MG tablet Take 1 tablet by mouth  Daily. 30 tablet 3   • gabapentin (NEURONTIN) 300 MG capsule TAKE TWO CAPSULES BY MOUTH THREE TIMES A  capsule 3   • letrozole (FEMARA) 2.5 MG tablet Take 1 tablet by mouth Daily. 90 tablet 3   • levothyroxine (SYNTHROID, LEVOTHROID) 25 MCG tablet Take 1 tablet by mouth Daily. 90 tablet 1   • OLANZapine (zyPREXA) 5 MG tablet TAKE ONE TABLET BY MOUTH ONCE NIGHTLY 30 tablet 1   • omeprazole (PrilOSEC) 20 MG capsule Take 1 capsule by mouth 2 (Two) Times a Day. 180 capsule 1   • ondansetron (ZOFRAN) 8 MG tablet Take 1 tablet by mouth 3 (Three) Times a Day As Needed for Nausea or Vomiting. 30 tablet 5   • oxyCODONE-acetaminophen (Percocet) 5-325 MG per tablet Take 1 tablet by mouth Every 6 (Six) Hours As Needed for Moderate Pain . 120 tablet 0   • propranolol LA (Inderal LA) 60 MG 24 hr capsule Take 1 capsule by mouth Daily. 90 capsule 1   • rizatriptan (MAXALT) 10 MG tablet Take 1 tablet by mouth 1 (One) Time for 1 dose. AT ONSET OF HEADACHE MAY REPEAT ONE TABLET IN 2 HOURS IF NEEDED 12 tablet 5   • simvastatin (ZOCOR) 20 MG tablet Take 1 tablet by mouth Daily. 90 tablet 1     No current facility-administered medications on file prior to visit.       No Known Allergies      Referring Provider:    No referring provider defined for this encounter.    Oncologist:  No primary care provider on file.      Seen and approved by:  Patty Franklin MD  07/14/2022

## 2022-07-15 RX ORDER — FLUOXETINE HYDROCHLORIDE 40 MG/1
CAPSULE ORAL
Qty: 90 CAPSULE | Refills: 1 | Status: SHIPPED | OUTPATIENT
Start: 2022-07-15 | End: 2022-08-19

## 2022-07-18 ENCOUNTER — HOSPITAL ENCOUNTER (OUTPATIENT)
Dept: PET IMAGING | Facility: HOSPITAL | Age: 57
Discharge: HOME | End: 2022-07-18

## 2022-07-18 DIAGNOSIS — C50.411 MALIGNANT NEOPLASM OF UPPER-OUTER QUADRANT OF RIGHT BREAST IN FEMALE, ESTROGEN RECEPTOR POSITIVE: ICD-10-CM

## 2022-07-18 DIAGNOSIS — C79.51 NON-SMALL CELL LUNG CANCER METASTATIC TO BONE: ICD-10-CM

## 2022-07-18 DIAGNOSIS — C34.12 PRIMARY ADENOCARCINOMA OF UPPER LOBE OF LEFT LUNG: ICD-10-CM

## 2022-07-18 DIAGNOSIS — C34.90 NON-SMALL CELL LUNG CANCER METASTATIC TO BONE: ICD-10-CM

## 2022-07-18 DIAGNOSIS — Z17.0 MALIGNANT NEOPLASM OF UPPER-OUTER QUADRANT OF RIGHT BREAST IN FEMALE, ESTROGEN RECEPTOR POSITIVE: ICD-10-CM

## 2022-07-18 LAB — GLUCOSE BLDC GLUCOMTR-MCNC: 85 MG/DL (ref 70–130)

## 2022-07-18 PROCEDURE — 82962 GLUCOSE BLOOD TEST: CPT

## 2022-07-18 PROCEDURE — 0 FLUDEOXYGLUCOSE F18 SOLUTION: Performed by: INTERNAL MEDICINE

## 2022-07-18 PROCEDURE — 78815 PET IMAGE W/CT SKULL-THIGH: CPT

## 2022-07-18 PROCEDURE — A9552 F18 FDG: HCPCS | Performed by: INTERNAL MEDICINE

## 2022-07-18 RX ORDER — OLANZAPINE 5 MG/1
TABLET ORAL
Qty: 30 TABLET | Refills: 1 | Status: SHIPPED | OUTPATIENT
Start: 2022-07-18 | End: 2022-08-09 | Stop reason: HOSPADM

## 2022-07-18 RX ORDER — OXYCODONE HYDROCHLORIDE AND ACETAMINOPHEN 5; 325 MG/1; MG/1
1 TABLET ORAL EVERY 6 HOURS PRN
Qty: 120 TABLET | Refills: 0 | Status: SHIPPED | OUTPATIENT
Start: 2022-07-18 | End: 2022-08-16 | Stop reason: ALTCHOICE

## 2022-07-18 RX ADMIN — FLUDEOXYGLUCOSE F18 1 DOSE: 300 INJECTION INTRAVENOUS at 08:14

## 2022-07-19 ENCOUNTER — OFFICE VISIT (OUTPATIENT)
Dept: ONCOLOGY | Facility: CLINIC | Age: 57
End: 2022-07-19

## 2022-07-19 ENCOUNTER — INFUSION (OUTPATIENT)
Dept: ONCOLOGY | Facility: HOSPITAL | Age: 57
End: 2022-07-19

## 2022-07-19 VITALS
HEART RATE: 100 BPM | TEMPERATURE: 97.3 F | RESPIRATION RATE: 16 BRPM | WEIGHT: 169.1 LBS | SYSTOLIC BLOOD PRESSURE: 129 MMHG | BODY MASS INDEX: 29.96 KG/M2 | OXYGEN SATURATION: 96 % | HEIGHT: 63 IN | DIASTOLIC BLOOD PRESSURE: 89 MMHG

## 2022-07-19 DIAGNOSIS — C34.12 PRIMARY ADENOCARCINOMA OF UPPER LOBE OF LEFT LUNG: Primary | ICD-10-CM

## 2022-07-19 DIAGNOSIS — C34.90 NON-SMALL CELL LUNG CANCER METASTATIC TO BONE: ICD-10-CM

## 2022-07-19 DIAGNOSIS — C34.12 PRIMARY ADENOCARCINOMA OF UPPER LOBE OF LEFT LUNG: ICD-10-CM

## 2022-07-19 DIAGNOSIS — C79.51 NON-SMALL CELL LUNG CANCER METASTATIC TO BONE: ICD-10-CM

## 2022-07-19 LAB
ALBUMIN SERPL-MCNC: 4 G/DL (ref 3.5–5.2)
ALBUMIN/GLOB SERPL: 1.1 G/DL (ref 1.1–2.4)
ALP SERPL-CCNC: 150 U/L (ref 38–116)
ALT SERPL W P-5'-P-CCNC: 7 U/L (ref 0–33)
ANION GAP SERPL CALCULATED.3IONS-SCNC: 13.5 MMOL/L (ref 5–15)
AST SERPL-CCNC: 16 U/L (ref 0–32)
BASOPHILS # BLD AUTO: 0.06 10*3/MM3 (ref 0–0.2)
BASOPHILS NFR BLD AUTO: 0.9 % (ref 0–1.5)
BILIRUB SERPL-MCNC: 0.2 MG/DL (ref 0.2–1.2)
BUN SERPL-MCNC: 15 MG/DL (ref 6–20)
BUN/CREAT SERPL: 10.7 (ref 7.3–30)
CALCIUM SPEC-SCNC: 9.5 MG/DL (ref 8.5–10.2)
CHLORIDE SERPL-SCNC: 103 MMOL/L (ref 98–107)
CO2 SERPL-SCNC: 24.5 MMOL/L (ref 22–29)
CREAT SERPL-MCNC: 1.4 MG/DL (ref 0.6–1.1)
DEPRECATED RDW RBC AUTO: 48.5 FL (ref 37–54)
EGFRCR SERPLBLD CKD-EPI 2021: 43.4 ML/MIN/1.73
EOSINOPHIL # BLD AUTO: 0.39 10*3/MM3 (ref 0–0.4)
EOSINOPHIL NFR BLD AUTO: 5.8 % (ref 0.3–6.2)
ERYTHROCYTE [DISTWIDTH] IN BLOOD BY AUTOMATED COUNT: 12.9 % (ref 12.3–15.4)
GLOBULIN UR ELPH-MCNC: 3.8 GM/DL (ref 1.8–3.5)
GLUCOSE SERPL-MCNC: 131 MG/DL (ref 74–124)
HCT VFR BLD AUTO: 37.6 % (ref 34–46.6)
HGB BLD-MCNC: 11.2 G/DL (ref 12–15.9)
IMM GRANULOCYTES # BLD AUTO: 0.03 10*3/MM3 (ref 0–0.05)
IMM GRANULOCYTES NFR BLD AUTO: 0.4 % (ref 0–0.5)
LYMPHOCYTES # BLD AUTO: 0.72 10*3/MM3 (ref 0.7–3.1)
LYMPHOCYTES NFR BLD AUTO: 10.6 % (ref 19.6–45.3)
MCH RBC QN AUTO: 30.5 PG (ref 26.6–33)
MCHC RBC AUTO-ENTMCNC: 29.8 G/DL (ref 31.5–35.7)
MCV RBC AUTO: 102.5 FL (ref 79–97)
MONOCYTES # BLD AUTO: 0.64 10*3/MM3 (ref 0.1–0.9)
MONOCYTES NFR BLD AUTO: 9.4 % (ref 5–12)
NEUTROPHILS NFR BLD AUTO: 4.94 10*3/MM3 (ref 1.7–7)
NEUTROPHILS NFR BLD AUTO: 72.9 % (ref 42.7–76)
NRBC BLD AUTO-RTO: 0 /100 WBC (ref 0–0.2)
PLATELET # BLD AUTO: 271 10*3/MM3 (ref 140–450)
PMV BLD AUTO: 8.8 FL (ref 6–12)
POTASSIUM SERPL-SCNC: 4.1 MMOL/L (ref 3.5–4.7)
PROT SERPL-MCNC: 7.8 G/DL (ref 6.3–8)
RBC # BLD AUTO: 3.67 10*6/MM3 (ref 3.77–5.28)
SODIUM SERPL-SCNC: 141 MMOL/L (ref 134–145)
WBC NRBC COR # BLD: 6.78 10*3/MM3 (ref 3.4–10.8)

## 2022-07-19 PROCEDURE — 36591 DRAW BLOOD OFF VENOUS DEVICE: CPT

## 2022-07-19 PROCEDURE — 80053 COMPREHEN METABOLIC PANEL: CPT

## 2022-07-19 PROCEDURE — 99215 OFFICE O/P EST HI 40 MIN: CPT | Performed by: INTERNAL MEDICINE

## 2022-07-19 PROCEDURE — 85025 COMPLETE CBC W/AUTO DIFF WBC: CPT

## 2022-07-19 PROCEDURE — 25010000002 HEPARIN LOCK FLUSH PER 10 UNITS: Performed by: INTERNAL MEDICINE

## 2022-07-19 RX ORDER — DEXAMETHASONE 4 MG/1
TABLET ORAL
Qty: 12 TABLET | Refills: 5 | Status: SHIPPED | OUTPATIENT
Start: 2022-07-19 | End: 2022-08-09 | Stop reason: HOSPADM

## 2022-07-19 RX ORDER — HEPARIN SODIUM (PORCINE) LOCK FLUSH IV SOLN 100 UNIT/ML 100 UNIT/ML
500 SOLUTION INTRAVENOUS ONCE
Status: COMPLETED | OUTPATIENT
Start: 2022-07-19 | End: 2022-07-19

## 2022-07-19 RX ORDER — SODIUM CHLORIDE 0.9 % (FLUSH) 0.9 %
10 SYRINGE (ML) INJECTION ONCE
Status: COMPLETED | OUTPATIENT
Start: 2022-07-19 | End: 2022-07-19

## 2022-07-19 RX ORDER — ONDANSETRON HYDROCHLORIDE 8 MG/1
8 TABLET, FILM COATED ORAL 3 TIMES DAILY PRN
Qty: 30 TABLET | Refills: 5 | Status: SHIPPED | OUTPATIENT
Start: 2022-07-19 | End: 2022-07-25

## 2022-07-19 RX ADMIN — SODIUM CHLORIDE, PRESERVATIVE FREE 500 UNITS: 5 INJECTION INTRAVENOUS at 14:19

## 2022-07-19 RX ADMIN — Medication 10 ML: at 14:19

## 2022-07-19 NOTE — NURSING NOTE
No treatment today, guardant 360 lab drawn from port. Port flushed/heparin locked and de accessed. Pt and  sent up to scheduling for updated appointments.

## 2022-07-19 NOTE — PROGRESS NOTES
Subjective   Holli Patel is a 57 y.o. female.  Referred by Dr. Molina for bilateral breast cancer    History of Present Illness   Ms. 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    3/1/2021-bilateral screening mammogram-microcalcifications seen in the posterior one third of the lateral aspect of the right breast.  Area of focal asymmetry seen in the middle one third of the upper inner quadrant of the left breast.    3/1/2021-DEXA scan-T score of -2.2 on the lumbar spine and T score of -2.3 in the left hip and T score of -1.9 in the right hip.  Findings consistent with osteopenia    3/23/2021-screening lung CT-there is a 10 x 11 mm solid nodule in the left upper lobe.  Enlarged AP window lymph node measuring 14 x 10 mm.  Suggestion of a possible 9 mm left hilar lymph node.  Heavy coronary artery calcification.    3/23/2021-diagnostic mammogram bilateral-cluster of microcalcifications in the middle third lateral aspect of the right breast.  Left breast demonstrates persistence of the area of focal asymmetry in the region.    Ultrasound-left breast ultrasound at 10 o'clock position, 6 cm from the nipple there is a 0.4 cm irregular hypoechoic lesion.  Stereotactic biopsy of the right breast calcifications recommended.  Ultrasound-guided biopsy of the left breast lesion recommended    4/7/2021-right breast stereotactic biopsy and left breast ultrasound-guided biopsy  Pathology  Right breast-invasive ductal carcinoma, grade 2, lymphovascular invasion present, ER +99% strong, WA +80% moderate, HER-2 negative, Ki-67 12%  Left breast upper inner quadrant 10 o'clock position biopsy, invasive ductal carcinoma, grade   2, ER +99% strong, WA +40% strong, HER-2 2+ on immunohistochemistry, nonamplified on FISH Ki-67 10%    4/14/2021-PET/CT-FDG avid aortopulmonary window lymph node measures 0.9 cm with an SUV of 7.5.  FDG avid left hilar  lymph node measures 1 cm with an SUV of 17.2.  Irregular soft tissue density in the right breast measuring 3.7 x 3.8 cm is favored to be secondary to the recent breast biopsy.  1.1 cm pulmonary nodule within the left upper lobe with SUV 9.7 .  Sub-6 mm pulmonary nodules are present in the right lower lobe.  No evidence of lymphadenopathy or metastatic disease in the abdomen.  Focal area of FDG uptake within the central canal posterior to T11-T12 displaced demonstrating an SUV of 5.5 thought to be reactive however MRI is recommended for further evaluation.    She was seen by Dr. Molina who initially planned for breast surgery however  due to the PET abnormalities she has been referred to Dr. Kerr who plans to do a wound on 4/30/2021.  Dr. Molina's and Dr. Kerr's notes reviewed.    Patient denies any family history of breast cancer.  Her mother had lymphoma.  Maternal grandmother had colon cancer.  Prior to that screening mammogram she did not have any palpable abnormalities of either breast.    She has been a heavy smoker for about 40 years and smoked 2 packs/day.  Denies any recent weight changes, new bone pains, cough, abdominal pain nausea vomiting constipation or diarrhea.  She does have anxiety and has been on several medications.  She has recently been started on Xanax to help with the mood and also with insomnia.    Patient had a video-assisted thoracoscopy and mediastinal lymphadenectomy on 4/30/2021.  L5-L6 lymph nodes were biopsied however the small pulmonary nodule in the left upper lobe was not difficult to find.  Pathology showed moderately differentiated adenocarcinoma which is CK7 and TTF-1 positive.  Consistent with lung primary.  Ki-67 85%.    5/14/2021-MRI of the brain-minimal chronic small vessel ischemic change in the white matter.  Otherwise negative MRI.    5/20/2021-bilateral axillary ultrasound-no evidence of axillary lymphadenopathy in either axilla.  Normal-appearing bilateral axillary  lymph nodes visualized.    Cycle 1 cisplatin and Alimta on 5/25/2021.    Cycle 2 cisplatin Alimta 6/15/2021    Cycle 3 cisplatin Alimta 7/7/2021    Completed radiation on 7/12/2021    Port was nonfunctional hence a port study was performed which showed that the port was backed up into the innominate vein and also there was a nonocclusive thrombus at the end of the catheter extending into the superior vena cava.  She was started on anticoagulation with Eliquis.  It was recommended for port revision of the intention to keep the port.    PET/CT 8/5/2021-images independently reviewed and interpreted by me-decrease in size and FDG uptake of the left upper lobe pulmonary nodule as well as mediastinal and left hilar lymphadenopathy representing response to treatment.  Sub-6 mm pulmonary nodule in the left upper lobe new since 4/14/2021.  This could be related to radiation however follow-up CT in 3 months recommended.  Decrease in size of the irregular masslike tissue in the right breast which is postbiopsy hematoma.  This is not PET avid.  New segmental left eighth rib fractures healing.  A new band of sclerosis of the left seventh rib which favored to represent healing nondisplaced fracture.  Follow-up CT recommended.  focal uptake in the duodenum first and second portions.  Could be related to duodenitis.  Indeterminate lesion in the L1 vertebral body not well evaluated on PET/CT.    10/1/2021-MRI of the lumbar spine.  Lesion in the left posterior body of L1 measures 14 x 14 x 12 mm and previously 5 x 5 x 6 mm.  The interval enlargement strongly suggest that it is metastatic lesion.  No additional osseous metastasis noted.   Other chronic changes noted.    10/14/2021-biopsy of the lumbar spine lesion-pathology consistent with poorly differentiated carcinoma.  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    10/26/2021-brain MRI-no evidence of  metastatic disease.    10/26/2021-bilateral diagnostic mammogram and ultrasound  Scattered fibroglandular density in both breast.  Postbiopsy hematoma with internal biopsy clip in the upper outer quadrant of the right breast, 8 cm from the nipple.  The hematoma size is decreased to 2.9 cm from 3.6 cm earlier.    Left breast-parenchymal density measuring 9 x 10 mm has markedly decreased.  Few adjacent calcifications which are unchanged.  No new abnormality in the left breast.  At 10:00 region there is some minimal adjacent hypoechoic texture which is difficult to measure but appears smaller since the previous exam.    10/28/2021-PET/CT  New L1 and L2 lytic bone metastasis annually intensely hypermetabolic focus at the left adrenal gland likely represents metastatic disease as well.  Slight decrease in size of the 0.9 x 0.7 cm left upper lobe pulmonary nodule.  There is hypermetabolic activity related to radiation pneumonitis.  The remainder of the nodule is photopenic.  Uncertain etiology of the more intense activity along the right lateral peripheral margin of the 2.6 cm postbiopsy density of the right breast.    She completed radiation to to the lumbar spine on 11/19/2021 11/22/2021-cycle 1 Alimta and Keytruda    3/23/2022-bilateral diagnostic mammogram and ultrasound  Complete resolution of the microcalcifications in the upper outer quadrant of the right breast and decrease in size of the postbiopsy hematoma.  No suspicious abnormalities in the right breast.  Benign-appearing calcifications at the site of malignancy in the left breast upper inner quadrant.  No other suspicious findings.    4/11/2022 PET/CT-there is new groundglass opacities in the left upper lobe which are most likely postradiation changes.  Other groundglass opacities in the left lung as well as the right lung remained stable.  Increased nodularity of the left adrenal gland with an SUV of 5.6, previously 3.5.    Increased uptake in the L1  vertebra in the right posterior aspect with an SUV of 3.6.  Left L1 sclerosis increased    Due to this the case was discussed in multidisciplinary conference.  The disease progression was significant in the left adrenal gland as well as the L1 vertebra.  Since there were only 2 areas of disease progression it has been decided to proceed with radiation to these 2 spots and continue on Keytruda and Alimta.    Patient also has had worsening of her kidney function.  We initially held treatment as of 5/31/2022 and creatinine bumped up to 2.0, BUN 23.  We then resumed therapy 6/11/2022 with Keytruda alone.  Patient was referred to nephrology.    6/6/2022-MRI of the spine-diffuse marrow replacement in the L1 vertebral body and inferior endplate concavity.  Suspicious for metastatic disease with pathological fracture in the inferior endplate.  Also diffuse signal abnormality in the L2 lamina on the left suspicious for additional metastatic disease.  Abnormality of the first sacral segment suspicious for metastatic disease.  Left adrenal gland appears enlarged.  30 to 40% height loss and L2 vertebral body suggestive of a subacute compression fracture.  Degenerative changes.    7/18/2022-PET/CT  Multiple hypermetabolic osseous lesions with index lesions which have either increased in degree of FDG uptake or newly hypermetabolic lesions representative of metastatic disease and progression of disease.  Possible FDG avid lesion in the left femoral diaphysis however these are incompletely visualized and in the area of artifactual FDG uptake.  MRI of the left femur recommended for further evaluation.  Increasing FDG uptake of the left adrenal gland.  Focal left hilar hypermetabolic him corresponds to the lymph node which has minimally increased in size compared to April 2022.  New sub-6 mm pulmonary nodules.    Interval history:  Holli Patel is a 57 y.o. female with the above-mentioned history returns to the office today for  scheduled follow-up.    She is here to discuss the PET/CT results.  Scheduled for Keytruda and Alimta.  She reports worsening pain in the right gluteal region.    The following portions of the patient's history were reviewed and updated as appropriate: allergies, current medications, past family history, past medical history, past social history, past surgical history and problem list.    Past Medical History:   Diagnosis Date   • Anxiety    • Clot     POSSIBLE BLOOD CLOT IN VENOUS ACCESS DEVICE-PT STATES WAS STARTED ON ELIQUIS    • Dyspnea on exertion    • Elevated cholesterol    • Frequent urination     DAY AND NIGHT   • SHAYY (generalized anxiety disorder)     RELATED HIGH BLOOD PRESSURE   • GERD (gastroesophageal reflux disease)    • High blood pressure     ANXIETY RELATED   • Hyperlipidemia    • Hypothyroidism    • Lung cancer (HCC)    • Lung nodule     LEFT   • Malignant neoplasm of upper-outer quadrant of right breast in female, estrogen receptor positive (HCC) 4/13/2021    PT STATES HAS BILAT BREAST CANCER   • Migraine    • PONV (postoperative nausea and vomiting)         Past Surgical History:   Procedure Laterality Date   • BREAST BIOPSY Bilateral 04/07/2021    Right Breast 10 o'clock & Left breast 10 o'clock 6 cm from nipple, [facility]   • CLOSED REDUCTION HIP DISLOCATION Left 4/30/2021    Procedure: BRONCHOSCOPY, LEFT VIDEO ASSITED THORACOSCOPY, ROBOTIC ASSSITED MEDIASTINAL LYMPHADENECTOMY WITH INTERCOSTAL NERVE BLOCKS;  Surgeon: Raphael Kerr III, MD;  Location: [facility];  Service: DaVinci;  Laterality: Left;   • COLONOSCOPY     • TUBAL ABDOMINAL LIGATION     • VENOUS ACCESS DEVICE (PORT) INSERTION Right 5/20/2021    Procedure: INSERTION VENOUS ACCESS DEVICE;  Surgeon: Raphael Kerr III, MD;  Location: [facility] OR;  Service: Thoracic;  Laterality: Right;   • VENOUS ACCESS DEVICE (PORT) INSERTION Right 11/29/2021    Procedure: REVISION AND REPLACEMENT RIGHT SUBCLAVIAN VENOUS ACCESS PORT;   "Surgeon: Raphael Kerr III, MD;  Location: [facility] OR;  Service: Thoracic;  Laterality: Right;   • WRIST SURGERY Right         Family History   Problem Relation Age of Onset   • Cancer Father         LYMPHATIC   • Prostate cancer Father    • Lymphoma Father    • Alcohol abuse Brother    • Colon cancer Maternal Grandmother    • Malig Hyperthermia Neg Hx         Social History     Socioeconomic History   • Marital status:      Spouse name: Jelani   Tobacco Use   • Smoking status: Former Smoker     Packs/day: 1.00     Years: 30.00     Pack years: 30.00     Types: Electronic Cigarette, Cigarettes     Start date: 1981     Quit date:      Years since quittin.5   • Smokeless tobacco: Never Used   • Tobacco comment: ABT 15 CIGARETTES DAILY   Vaping Use   • Vaping Use: Some days   • Substances: Nicotine, Flavoring   • Devices: Disposable   Substance and Sexual Activity   • Alcohol use: Never   • Drug use: Never   • Sexual activity: Yes     Partners: Male        OB History        2    Para   2    Term   2            AB        Living           SAB        IAB        Ectopic        Molar        Multiple        Live Births                Age of menarche-10  Age at menopause-42  Oral contraceptive pill use-15 years  No HRT use  She has 2 children  Age at first live childbirth-17    No Known Allergies     Review of Systems   Review of systems as mentioned in the HPI    Objective   Blood pressure 129/89, pulse 100, temperature 97.3 °F (36.3 °C), temperature source Temporal, resp. rate 16, height 160 cm (62.99\"), weight 76.7 kg (169 lb 1.6 oz), SpO2 96 %, not currently breastfeeding.       Physical Exam  Vitals reviewed.   HENT:      Head: Normocephalic.   Eyes:      Pupils: Pupils are equal, round, and reactive to light.   Cardiovascular:      Rate and Rhythm: Normal rate and regular rhythm.      Pulses: Normal pulses.      Heart sounds: Normal heart sounds.   Pulmonary:      Effort: Pulmonary " effort is normal.      Breath sounds: Normal breath sounds.   Abdominal:      General: Abdomen is flat.   Musculoskeletal:         General: No swelling. Normal range of motion.      Cervical back: Normal range of motion.   Skin:     General: Skin is warm.   Neurological:      General: No focal deficit present.      Mental Status: She is alert and oriented to person, place, and time.   Psychiatric:         Mood and Affect: Mood normal.         Behavior: Behavior normal.       I have reexamined the patient and the results are consistent with the previously documented exam. Salma Snell MD     Results from last 7 days   Lab Units 07/19/22  1225   WBC 10*3/mm3 6.78   NEUTROS ABS 10*3/mm3 4.94   HEMOGLOBIN g/dL 11.2*   HEMATOCRIT % 37.6   PLATELETS 10*3/mm3 271     Results from last 7 days   Lab Units 07/19/22  1225   SODIUM mmol/L 141   POTASSIUM mmol/L 4.1   CHLORIDE mmol/L 103   CO2 mmol/L 24.5   BUN mg/dL 15   CREATININE mg/dL 1.40*   CALCIUM mg/dL 9.5   ALBUMIN g/dL 4.00   BILIRUBIN mg/dL 0.2   ALK PHOS U/L 150*   ALT (SGPT) U/L 7   AST (SGOT) U/L 16   GLUCOSE mg/dL 131*              NM PET/CT Skull Base to Mid Thigh    Result Date: 7/19/2022  1.  Multiple hypermetabolic osseous lesions with index lesions which have either increased in degree of FDG uptake or are newly hypermetabolic likely represent metastatic disease. Findings of a probable FDG avid lesion within the left femoral diaphysis; however findings are incompletely visualized and in an area of artifactual FDG uptake. Further evaluation with MRI of the left femur with and without contrast is recommended for confirmation as a metastatic lesion in this area would place this patient at risk for pathologic fracture. 2.  Interval increase in the degree of FDG uptake within the left adrenal gland likely represents metastatic disease. 3.   Focal left hilar hypermetabolism appears to correspond with a lymph node which is minimally increased in size since  04/11/2022. While findings may be reactive, they are most concerning for metastatic disease given the above findings. 4.  A few new bilateral sub-6 mm pulmonary nodules, indeterminate. Follow-up with chest CT in 3 months is recommended to exclude metastatic disease. 5.  Continued evolution of presumed postradiation changes within the perihilar aspect of the left mid and upper lung with interval decrease in extent of previously seen patchy groundglass opacification within the left lung. Small left pleural effusion, as before. Continued attention on follow-up of these areas recommended to exclude residual neoplasm. 6.  Other findings as above  This report was finalized on 7/19/2022 9:52 AM by Dr. Serjio Lorenz M.D.        7/18/2022-PET/CT images independently reviewed interpreted by me.  Detail summarized in the HPI    Assessment/Plan      *Bilateral breast cancer  · Invasive ductal carcinoma in both right and left breast and lesions measure under 1 cm.  No palpable lymphadenopathy although unsure if this has been evaluated by an axillary ultrasound.  No abnormal axillary lymphadenopathy noted on PET.  · Most likely clinical T1b N0 M0, both cancers were noted to be grade 2, ER/MN positive and HER-2 negative and Ki-67 less than 15%  VATS on 4/30/2021  Biopsy confirms adenocarcinoma of pulmonary primary  Discussed with Dr. oMlina about delaying breast surgery and she is in agreement  Darted on neoadjuvant anastrozole  · Patient experienced significant adverse effects with anastrozole  · Treatment changed to letrozole   · Axillary ultrasound 4/20/2021 -negative for any axillary lymphadenopathy  · Started letrozole in May 2021  · She continues on letrozole without any problems.  · 8/24/2021 bilateral diagnostic mammogram and ultrasound.  In the left breast the tumor now measures 1 cm on mammogram as opposed to 0.8 cm in March.  Right breast calcifications also noted to be in a large extent although the number seems to  be decreased.  Hematoma has decreased in size.  · 10/26/2021-bilateral diagnostic mammogram and ultrasound-decrease in size of the bilateral breast lesions.  · 10/26/2021-PET/CT-metastatic lesions in the left adrenal gland, L1-L2.  · 10/26/2021-MRI of the brain-no evidence of metastatic disease  · 3/23/2022-bilateral diagnostic mammogram and ultrasound show complete resolution of the tumors.  · Continue letrozole, good response to treatment.  · Unchanged    *Adenocarcinoma of the left lung, metastatic   · Most likely stage T1 N2 M0, stage III  · MRI of the thoracic spine ordered to further evaluate the abnormality seen on PET  · MRI of the brain negative   · Given that she is only 55 and fairly good performance status I discussed with her by with concurrent chemoradiation with cisplatin and Alimta every 3 weeks followed by durvalumab.  · Adverse effects including but not limited to myelosuppression, increased risk of infections, nausea, vomiting, renal dysfunction, ototoxicity, neurotoxicity have been discussed at length.  · MRI of the thoracic spine performed 5/24/2021 shows no evidence of metastatic disease in the thoracic spine however in L1 there is a 7 mm lesion which is indeterminate.  A lumbar spine MRI has been recommended.  · Discussed her case with Dr. Oates and he feels like MRI of the lumbar spine may also show that this lesion is indeterminate.  · Discussed the same with the patient and her .  · Even if this is metastatic disease this might be the only lesion of metastatic disease and then would consider this oligometastatic disease.  · Therefore plan to proceed with concurrent chemoradiation as scheduled.  · cisplatin and Alimta, C1D1 5/25/2021  · Day 1 of radiation 5/25/2021  · 5/27/2021-MRI of the lumbar spine-there is a 6 x 5 x 6 mm enhancing lesion in the left posterior body of L1 vertebra.  This is considered indeterminate.  Follow-up recommended.  Degenerative changes noted at  L5-S1  · 7/7/2021, cycle 3 cisplatin Alimta  · 7/12/2021-radiation completed  · PET/CT 8/6/2021 with good response to chemoradiation.  · MRI of the lumbar spine 10/1/2021 with increase in size of the L1 lesion which now measures 14 mm as opposed to 6 mm in May 2021.  · 10/14/2021-biopsy of the L1 lesion and pathology consistent with adenocarcinoma of the lung, TPS 0  · 0/26/2021-PET/CT-metastatic lesions in the left adrenal gland, L1-L2.  · 10/26/2021-MRI of the brain-no evidence of metastatic disease  · Given that she only has metastatic disease in L1-L2 and left adrenal gland I think it would be possible to radiate all these regions.  · Completed radiation to L1-L2 on 11/19/2021  · 11/22/2021-cycle 1 of Keytruda and Alimta  · Completed radiation to the left adrenal gland  · 1/18/2022-PET/CT-images independently reviewed and interpreted by me.  Decrease in size of the 7 mm pulmonary nodule in the left upper lobe which previously measured 9 mm.  Radiation pneumonitis is nearly resolved.  Hypermetabolic activity at the L1-L2 metastasis has resolved.  No new suspicious metastatic disease.  · Guardant 360 circulating DNA level decreasing.  · 3/7/2022-patient is complaining of fluid retention.  · Thought to be secondary to Alimta and started on steroids and Lasix.  · Developed CHASITY on Lasix since Lasix discontinued.  · Alimta dose has been reduced.   · PET/CT 4/11/2022 concerning for disease progression in the left adrenal gland as well as L1 vertebra on the left.  · Case presented and discussed at the thoracic multidisciplinary conference.  The plan is to proceed with Keytruda and Alimta and radiation to the left adrenal gland and the L1 vertebra.  · She will be referred back to radiation oncology, she had to reschedule her appointment due to COVID-19 infection in the family.    · Evaluated by radiation oncology on 6/1/2022 and plan is to proceed with L1 and adrenal radiation.  MRI of the lumbar spine performed  6/6/2022  · Read pending  · 6/7/22: GFR still low; hold Alimta but proceed with Keytruda   · 6/28/22:This does show concerning area at L1 as well as concerning enlarging left adrenal gland nodule.  This is consistent with prior imaging.  This MRI was obtained specifically to help radiation oncology decide if additional radiation is possible.   · Completed 5 treatments to the lumbar spine, 30 [address].  Radiation completed on 7/14/2022  · PET/CT 7/18/2022-multiple areas of disease progression noted in the spine and in the right sacrum.  · Alimta has been on hold since 5/10/2022 due to renal dysfunction.  · She continued on Keytruda without any interruption.  · Although she was off Alimta for 2 months even prior to holding Alimta there was concern for disease progression on the PET/CT.  · Due to this reason we will discontinue Alimta and Keytruda and start Taxotere 75 mg per metered squared every 3 weeks.  · Adverse effects of Taxotere including but not limited to myelosuppression, increased risk of infections, nausea, vomiting, neuropathy, alopecia, infusion reactions, fluid retention discussed.  · We will also repeat guardant 360 for any actionable mutations.      *Fluid retention  · Improved off of Alimta.    *CHASITY  · Most likely secondary to Lasix  · Discontinue Lasix  · 1 L normal saline 3/28/2022  · Kidney function improved, Creatinine 1.69 and BUN 23.  Encourage patient to increase oral intake.  Discouraged intake of caffeinated beverages.  Patient to return in 1 week for labs only around the time of her schedule PET scan appointment.  · 4/19/2022-creatinine 1.36.  · 5/10/2022-creatinine elevated at 1.96.  · 5/31/2022 creatinine elevated at 2.01  · 6/7/2022-creatinine 1.76. Alimta held, continue Keytruda  · 6/28/2022 creatinine 1.5, GFR 40.  Patient will see nephrology for new patient consultation 7/7/2022.  Continue to hold Alimta for now.  · Creatinine improved at 1.4 on 7/19/2022    *Anemia  · Hemoglobin 7.9    · Symptomatic  · 2 units of PRBC  · Hgb improved to 9.8 today   · 4/11/2022 hemoglobin stable at 8.8  · 5/31/2022-hemoglobin lower at 8.4  · 6/7/2022-hemoglobin improved at 8.8  · 6/28/2022-hemoglobin improved up to 10.4 (likely related to holding Alimta)  · 7/19/2022-hemoglobin improved at 11.2    *Bilateral breast cancer-no family history of breast cancer but given synchronous breast cancer genetic testing has been sent.  Genetic testing with a variant of unknown significance.    *Right hand numbness and difficulty with grasping objects  · Previously had history of carpal tunnel requiring repair.  · She is currently on letrozole which could cause worsening of the carpal tunnel  · Gabapentin dose will be increased to 600 mg 3 times daily  · She will also be referred to hand surgery for further evaluation.  · She now reports bilateral upper extremity numbness.  · Continue gabapentin  · MRI of the brain 10/26/2021 without any evidence of metastatic disease  · Not an issue today    *Anxiety-  · Currently on Xanax 1 mg nightly  · She required Valium for the MRI as she had claustrophobia and a panic attack  · Continue Zyprexa  · She is fairly anxious regarding the PET/CT.    *Depression  · Continue Zyprexa  · Related to cancer diagnosis and current situation where she is having to care for her elderly mother.  · Reports being extremely tired.  Continues to struggle with depression.  · Stable.    *Left-sided chest wall pain-secondary to VATS.  Most likely neuropathic.  Continue gabapentin and Norco as needed.  She ran out of gabapentin and experienced leg cramps.    Continue gabapentin  Improved    *Hypertension-  · Lisinopril discontinued due to CHASITY per above.    · Blood pressure normal at 129/89    *Smoking-not addressed today    *GERD  · 7/7/2021, patient complains of reflux despite the use of Prilosec 20 mg twice daily.  We will send her a prescription for Carafate slurry 4 times daily.  · She has not quite started  using the Carafate.  · Continue Prilosec and Carafate  · PET/CT 4/11/2022 shows increased uptake in the proximal esophagus.  Likely secondary to GERD.  · Continue current medications    *Port not returning blood    · A new Mediport was placed by thoracic surgery 11/29/2021, Eliquis discontinued and right chest Mediport is now safe to use.    *Fatigue  · ?  Secondary to Keytruda  · Fatigue unchanged        PLAN:  1. Discontinue Alimta and Keytruda  2. Start Taxotere  3. Repeat guardant 360  4. Follow-up with me in 2 weeks    Noted to have disease progression on PET/CT and we discussed starting new chemotherapy.  PET/CT independently reviewed and interpreted by me.  Patient is on treatment requiring close monitoring for toxicities.         Salma Snell MD

## 2022-07-20 ENCOUNTER — DOCUMENTATION (OUTPATIENT)
Dept: ONCOLOGY | Facility: CLINIC | Age: 57
End: 2022-07-20

## 2022-07-20 NOTE — PROGRESS NOTES
Xgeva approved until 7/20/2024-[employer]    Test claim through [facility] returned a copay of $713.70.    I will speak with Jelani Price, Pharmacy Manager, when he returns about having the medication shipped from Pharmacy to our office for administration.      Silva Tim  Specialty Pharmacy Technician

## 2022-07-20 NOTE — PROGRESS NOTES
Staff message rec from Heather Denis RN-she attempted to submit a PA for Xgeva to [facility] as buy and bill but the medication will have to go through Pharmacy benefit due to site of care.    I submitted the PA to [facility] through covermymeds. Waiting for a decision.    Danielle Menendez, KANDACE sent to Silva Tim.  I just tried to obtain the PA for Xgeva through [facility] as a buy and bill medication and according to Olga LIZAMA, due to the site of care being outpatient hospital, the PA has to be obtained under her pharmacy benefit.  She has renal disease and is unable to use Zometa.     Thank you.      Silva Tim  Specialty Pharmacy Technician

## 2022-07-25 ENCOUNTER — APPOINTMENT (OUTPATIENT)
Dept: LAB | Facility: HOSPITAL | Age: 57
End: 2022-07-25

## 2022-07-25 ENCOUNTER — OFFICE VISIT (OUTPATIENT)
Dept: ONCOLOGY | Facility: CLINIC | Age: 57
End: 2022-07-25

## 2022-07-25 VITALS
OXYGEN SATURATION: 92 % | BODY MASS INDEX: 30.48 KG/M2 | SYSTOLIC BLOOD PRESSURE: 133 MMHG | HEART RATE: 80 BPM | RESPIRATION RATE: 18 BRPM | TEMPERATURE: 97.1 F | HEIGHT: 63 IN | DIASTOLIC BLOOD PRESSURE: 89 MMHG | WEIGHT: 172 LBS

## 2022-07-25 DIAGNOSIS — C34.12 PRIMARY ADENOCARCINOMA OF UPPER LOBE OF LEFT LUNG: Primary | ICD-10-CM

## 2022-07-25 PROCEDURE — 99215 OFFICE O/P EST HI 40 MIN: CPT

## 2022-07-25 NOTE — PROGRESS NOTES
[facility] Hematology/Oncology Treatment Plan Summary    Name: Holli Patel  [facility]# [id number]  MD: Dr. Snell    Diagnosis:     ICD-10-CM ICD-9-CM   1. Primary adenocarcinoma of upper lobe of left lung (HCC)  C34.12 162.3     Stage:    Goal of treatment: disease control    Treatment Medication(s):   1. Taxotere    Frequency: Every 3 weeks    Starting on: 7/26/2022    Repeat after TBD cycles: CT or PET    Items for home use: Senokot-S (for constipation), Imodium AD (for diarrhea) and Thermometer    Rx written for: [x] Nausea    [x] Pre-Treatment   dexamethasone 4 mg by mouth twice daily on the day before, day of, and day after treatment and ondansetron 8 mg by mouth every 8 hours as needed for nausea    Notes:     Next Steps: Treatment day 1 on 7/26/2022    Completing Provider: KORI Breaux           Date/time: 07/25/2022      Please note: You will be seen by a provider frequently with your treatment plan. This plan may change depending on many factors, if so, this will be discussed with you by your physician.  Last update 03/2022.

## 2022-07-25 NOTE — PROGRESS NOTES
TREATMENT  PREPARATION    Holli Patel  [id number]  1965    Chief Complaint: Treatment preparation and needs assessment    History of present illness:  Holli Patel is a 57 y.o. year old female who is here today for treatment preparation and needs assessment.  The patient has been diagnosed with   Encounter Diagnosis   Name Primary?   • Primary adenocarcinoma of upper lobe of left lung (HCC) Yes    and is scheduled to begin treatment with:     Oncology History:    Oncology/Hematology History   Primary adenocarcinoma of upper lobe of left lung (HCC)   5/18/2021 Initial Diagnosis    Primary adenocarcinoma of upper lobe of left lung (CMS/HCC)     5/18/2021 Cancer Staged    Staging form: Lung, AJCC 8th Edition  - Clinical stage from 5/18/2021: cT1, cN2, cM0 - Signed by Shell Thomson MD on 5/18/2021 5/25/2021 - 8/16/2021 Chemotherapy    OP LUNG PEMEtrexed / CISplatin + XRT     8/11/2021 - 10/6/2021 Chemotherapy    OP LUNG Durvalumab     11/22/2021 - 6/28/2022 Chemotherapy    OP LUNG Pembrolizumab 200 mg / Pemetrexed     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     Past Medical History, Past Surgical History, Social History, Family History have been reviewed and are without significant changes except as mentioned.    Physical Exam:    Vitals:    07/25/22 1055   BP: 133/89   Pulse: 80   Resp: 18   Temp: 97.1 °F (36.2 °C)   SpO2: 92%     Vitals:    07/25/22 1055   PainSc:   8   PainLoc: Hip  Comment: rt        ECOG score: 0             Physical Exam  Vitals reviewed.   Constitutional:       Appearance: Normal appearance.   Neurological:      Mental Status: She is alert and oriented to person, place, and time.    Psychiatric:         Mood and Affect: Mood normal.         Behavior: Behavior normal.           NEEDS ASSESSMENTS    Genetics  The patient's new diagnosis and family history have been reviewed for genetic counseling needs. A genetic referral is not recommended.     Psychosocial and Barriers to care  The patient has completed a PHQ-9 Depression Screening and the Distress Thermometer (DT) today.  PHQ-9 results show PHQ-2 Total Score: 3 PHQ-9 Total Score: PHQ-9 Total Score: 3     The patient scored their distress today as Distress Level: 10 on a scale of 0-10 with 0 being no distress and 10 being extreme distress. Problems marked by the patient as being an issue for them within the last week include Practical Problems  : No  Housing: No  Transportation: No  Treatment decisions: No  Family Concerns  Ability to have children: No  Physical concerns  Fatigue: No  Pain: Yes  Sleep: No  Substance abuse: No .      Results were reviewed along with psychosocial resources offered by our cancer center.  Our Supportive Oncology team will be flagged for a score of 4 or above, and a same day call will be made for a score of 9 or 10.  A mental health referral is offered at that time. Patients who score less than 4 have been educated on our support services and can be referred to our  upon request.  The patient has been in contact previously and is not currently interested in additional follow up.       Nutrition  The patient has completed the malnutrition screening today. They scored Malnutrition Screening Tool  Have you recently lost weight without trying?  If yes, how much weight have you lost?: 0--> No  Have you been eating poorly because of a decreased appetite?: 0--> No  MST score: 0   with a score of 0-1 meaning not at risk in a score of 2 or greater meaning at risk.  Patients with a score of 3 or higher will be referred to our oncology dietitian for support. Patients beginning at risk treatment  regimens or who have dietary concerns will also be referred to our oncology dietitian. The patient will not be referred.    Functional Assessment  Persons who are age 70 or greater will be screened for qualification of a comprehensive geriatric assessment by our survivorship nurse practitioner.  Older adults with cancer face unique challenges. These may include an increased risk of drug reactions, financial burdens, and caregiver stress. The patient scored   . Patients scoring 14 or lower will referred for an older adult functional assessment with the survivorship advanced practice registered nurse to ensure all needed support is provided as patients plan for their treatments. The patient will not be referred.    Intravenous Access Assessment  The patient and I discussed planned intravenous chemo/biotherapy as well as other IV treatments that are often needed throughout the course of treatment. These may include, but are not limited to blood transfusions, antibiotics, and IV hydration. Discussed that depending on selected treatment and vein assessment, patient may require venous access device (VAD) which could include but not limited to a Mediport or PICC line. Risks and benefits of VADs reviewed. The patient will be treated via Port.    Reproductive/Sexual Activity   People should avoid becoming pregnant and should not get a partner pregnant while undergoing chemo/biotherapy.  People of childbearing age should use effective contraception during active therapy. The best recommendation for all people is to use a barrier method for a minimum of 1 week after the last infusion of chemo/biotherapy to prevent your partner being exposed to byproducts from treatment medications in bodily fluids. Effective contraception should be discussed with your oncology team to make sure it is safe to take based on your diagnosis. Possible options include oral contraceptives, barrier methods. Chemo/biotherapy can change your ability to  "reproduce children in the future.  There are options for fertility preservation. NOT APPLICABLE    Advanced Care Planning  Advance Care Planning   The patient and I discussed advanced care planning, \"Conversations that Matter\".   This service is offered for development of advance directives with a certified ACP facilitator.  The patient does not have an up-to-date advanced directive. This document is not on file with our office. The patient is not interested in an appointment with one of our facilitators to create or update their advanced directives.     Smoking cessation  Tobacco Use: Medium Risk   • Smoking Tobacco Use: Former Smoker   • Smokeless Tobacco Use: Never Used       Patient and I discussed their tobacco use history. Referral will not be made for smoking cessation.      Palliative Care  When appropriate, the patient and I discussed the availability palliative care services and when appropriate Hospice care. Palliative care is not the same as Hospice care which was explained to the patient.  The patient is not interested in additional information from our  on these services.     Survivorship   When appropriate, we discussed that we will refer the patient to survivorship clinic to discuss next steps following completion of planned treatment.  Reviewed this visit will include assessment of your physical, psychological, functional, and spiritual needs as a survivor and the need at attend this visit when scheduled.    TREATMENT EDUCATION    Today I met with the patient to discuss the chemo/biotherapy regimen recommended for treatment of Primary adenocarcinoma of upper lobe of left lung (HCC)  .  The patient was given explanation of treatment premed side effects including office policy that prohibits patients to drive if sedating medications are administered, MD explanation given regarding benefits, side effects, toxicities and goals of treatment.  The patient received a Chemotherapy/Biotherapy " Plan Summary including diagnosis and explanation of specific treatment plan.    SIDE EFFECTS:  Common side effects were discussed with the patient and/or significant other.  Discussion included where applicable hair loss/discoloration, anemia/fatigue, infection/chills/fever, appetite, bleeding risk/precautions, constipation, diarrhea, mouth sores, taste alteration, loss of appetite, nausea/vomiting, peripheral neuropathy, skin/nail changes, rash, muscle aches/weakness, photosensitivity, weight gain/loss, hearing loss, dizziness, menopausal symptoms, menstrual irregularity, sterility, high blood pressure, heart damage, liver damage, lung damage, kidney damage, DVT/PE risk, fluid retention, pleural/pericardial effusion, somnolence, electrolyte/LFT imbalance, vein exercises and/or the possible need for vascular access/port placement.  The patient was advised that although uncommon, leakage of an infused medication from the vein or venous access device may lead to skin breakdown and/or other tissue damage.  The patient was advised that he/she may have pain, bleeding, and/or bruising from the insertion of a needle in their vein or venous access device (port).  The patient was further advised that, in spite of proper technique, infection with redness and irritation may rarely occur at the site where the needle was inserted.  The patient was advised that if complications occur, additional medical treatment is available.  Finally, where applicable we have reviewed rare but potential immune mediated side effects including shortness of breath, cough, chest pain (pneumonitis), abdominal pain, diarrhea (colitis), thyroiditis (hypothyroid or hyperthyroid), hepatitis and liver dysfunction, nephritis and renal dysfunction.    Discussion also included side effects specific to drugs in the treatment plan, specifically:    Treatment Plans     Name Type Plan Dates Plan Provider         Active    OP SUPPORTIVE Denosumab (Xgeva) Q28D  ONCOLOGY SUPPORTIVE CARE 1  7/19/2022 - Present Salma Snell MD     OP LUNG DOCEtaxel ONCOLOGY TREATMENT  7/25/2022 - Present Salma Snell MD                    Questions answered and additional information discussed on topics including:  Anemia, Nutrition and appetite changes, Diarrhea, Alopecia, Nervous system changes, Pain and Hand/Foot Cooling       Assessment and Plan:    Diagnoses and all orders for this visit:    1. Primary adenocarcinoma of upper lobe of left lung (HCC) (Primary)      No orders of the defined types were placed in this encounter.        1. The patient and I have reviewed their diagnosis and scheduled treatment plan. Needs assessment was completed where applicable including genetics, psychosocial needs, barriers to care, VAD evaluation, advanced care planning, survivorship, and palliative care services where indicated. Referrals have been ordered as appropriate based upon evaluation today and patient desires.   2. Chemo/biotherapy teaching was completed today and consent obtained. See separate documentation for further details.  3. Adequate time was given to answer questions.  Patient made aware of their care team members and contact information if they have questions or problems throughout the treatment course.  4. Discussion held and written information provided describing frequency of office visits and ongoing monitoring throughout the treatment plan.     5. Reviewed with patient any prescribed medication sent to pharmacy.  Education provided regarding proper storage, safe handling, and proper disposal of unused medication.  6. Proper handling of body fluids and waste discussed and written information provided.  7. If appropriate, patient had pretreatment labs drawn today.    Learning assessment completed at initial patient encounter. See separate flowsheet. Chemo/biotherapy education comprehension assessed at today's visit.    I spent 42 minutes caring for Holli on this date of  service. This time includes time spent by me in the following activities: preparing for the visit, reviewing tests, performing a medically appropriate examination and/or evaluation, counseling and educating the patient/family/caregiver, referring and communicating with other health care professionals, documenting information in the medical record and care coordination.     Isadora Willams, KORI   07/25/22

## 2022-07-26 ENCOUNTER — TELEPHONE (OUTPATIENT)
Dept: ONCOLOGY | Facility: HOSPITAL | Age: 57
End: 2022-07-26

## 2022-07-26 ENCOUNTER — INFUSION (OUTPATIENT)
Dept: ONCOLOGY | Facility: HOSPITAL | Age: 57
End: 2022-07-26

## 2022-07-26 VITALS
OXYGEN SATURATION: 94 % | BODY MASS INDEX: 30.58 KG/M2 | RESPIRATION RATE: 16 BRPM | WEIGHT: 172.6 LBS | TEMPERATURE: 98.6 F | DIASTOLIC BLOOD PRESSURE: 79 MMHG | SYSTOLIC BLOOD PRESSURE: 113 MMHG | HEART RATE: 85 BPM

## 2022-07-26 DIAGNOSIS — C79.51 NON-SMALL CELL LUNG CANCER METASTATIC TO BONE: Primary | ICD-10-CM

## 2022-07-26 DIAGNOSIS — Z45.2 ENCOUNTER FOR FITTING AND ADJUSTMENT OF VASCULAR CATHETER: ICD-10-CM

## 2022-07-26 DIAGNOSIS — C34.90 NON-SMALL CELL LUNG CANCER METASTATIC TO BONE: Primary | ICD-10-CM

## 2022-07-26 LAB
ALBUMIN SERPL-MCNC: 4 G/DL (ref 3.5–5.2)
ALBUMIN/GLOB SERPL: 1.1 G/DL (ref 1.1–2.4)
ALP SERPL-CCNC: 154 U/L (ref 38–116)
ALT SERPL W P-5'-P-CCNC: 9 U/L (ref 0–33)
ANION GAP SERPL CALCULATED.3IONS-SCNC: 13.9 MMOL/L (ref 5–15)
AST SERPL-CCNC: 14 U/L (ref 0–32)
BASOPHILS # BLD AUTO: 0.01 10*3/MM3 (ref 0–0.2)
BASOPHILS NFR BLD AUTO: 0.1 % (ref 0–1.5)
BILIRUB SERPL-MCNC: <0.2 MG/DL (ref 0.2–1.2)
BUN SERPL-MCNC: 22 MG/DL (ref 6–20)
BUN/CREAT SERPL: 15.7 (ref 7.3–30)
CALCIUM SPEC-SCNC: 9.5 MG/DL (ref 8.5–10.2)
CHLORIDE SERPL-SCNC: 104 MMOL/L (ref 98–107)
CO2 SERPL-SCNC: 23.1 MMOL/L (ref 22–29)
CREAT SERPL-MCNC: 1.4 MG/DL (ref 0.6–1.1)
DEPRECATED RDW RBC AUTO: 47.1 FL (ref 37–54)
EGFRCR SERPLBLD CKD-EPI 2021: 43.4 ML/MIN/1.73
EOSINOPHIL # BLD AUTO: 0 10*3/MM3 (ref 0–0.4)
EOSINOPHIL NFR BLD AUTO: 0 % (ref 0.3–6.2)
ERYTHROCYTE [DISTWIDTH] IN BLOOD BY AUTOMATED COUNT: 12.8 % (ref 12.3–15.4)
GLOBULIN UR ELPH-MCNC: 3.8 GM/DL (ref 1.8–3.5)
GLUCOSE SERPL-MCNC: 156 MG/DL (ref 74–124)
HCT VFR BLD AUTO: 34.3 % (ref 34–46.6)
HGB BLD-MCNC: 10.4 G/DL (ref 12–15.9)
IMM GRANULOCYTES # BLD AUTO: 0.07 10*3/MM3 (ref 0–0.05)
IMM GRANULOCYTES NFR BLD AUTO: 0.7 % (ref 0–0.5)
LYMPHOCYTES # BLD AUTO: 0.47 10*3/MM3 (ref 0.7–3.1)
LYMPHOCYTES NFR BLD AUTO: 4.7 % (ref 19.6–45.3)
MAGNESIUM SERPL-MCNC: 1.7 MG/DL (ref 1.8–2.5)
MCH RBC QN AUTO: 30.7 PG (ref 26.6–33)
MCHC RBC AUTO-ENTMCNC: 30.3 G/DL (ref 31.5–35.7)
MCV RBC AUTO: 101.2 FL (ref 79–97)
MONOCYTES # BLD AUTO: 0.38 10*3/MM3 (ref 0.1–0.9)
MONOCYTES NFR BLD AUTO: 3.8 % (ref 5–12)
NEUTROPHILS NFR BLD AUTO: 8.99 10*3/MM3 (ref 1.7–7)
NEUTROPHILS NFR BLD AUTO: 90.7 % (ref 42.7–76)
NRBC BLD AUTO-RTO: 0 /100 WBC (ref 0–0.2)
PHOSPHATE SERPL-MCNC: 2.9 MG/DL (ref 2.5–4.5)
PLATELET # BLD AUTO: 268 10*3/MM3 (ref 140–450)
PMV BLD AUTO: 8.9 FL (ref 6–12)
POTASSIUM SERPL-SCNC: 4.2 MMOL/L (ref 3.5–4.7)
PROT SERPL-MCNC: 7.8 G/DL (ref 6.3–8)
RBC # BLD AUTO: 3.39 10*6/MM3 (ref 3.77–5.28)
SODIUM SERPL-SCNC: 141 MMOL/L (ref 134–145)
WBC NRBC COR # BLD: 9.92 10*3/MM3 (ref 3.4–10.8)

## 2022-07-26 PROCEDURE — 96372 THER/PROPH/DIAG INJ SC/IM: CPT

## 2022-07-26 PROCEDURE — 25010000002 HEPARIN LOCK FLUSH PER 10 UNITS: Performed by: INTERNAL MEDICINE

## 2022-07-26 PROCEDURE — 96413 CHEMO IV INFUSION 1 HR: CPT

## 2022-07-26 PROCEDURE — 80053 COMPREHEN METABOLIC PANEL: CPT

## 2022-07-26 PROCEDURE — 83735 ASSAY OF MAGNESIUM: CPT

## 2022-07-26 PROCEDURE — 85025 COMPLETE CBC W/AUTO DIFF WBC: CPT

## 2022-07-26 PROCEDURE — 25010000002 DOCETAXEL 20 MG/ML SOLUTION 8 ML VIAL: Performed by: INTERNAL MEDICINE

## 2022-07-26 PROCEDURE — 84100 ASSAY OF PHOSPHORUS: CPT

## 2022-07-26 PROCEDURE — 25010000002 DIPHENHYDRAMINE PER 50 MG: Performed by: INTERNAL MEDICINE

## 2022-07-26 PROCEDURE — 25010000002 DENOSUMAB 120 MG/1.7ML SOLUTION: Performed by: INTERNAL MEDICINE

## 2022-07-26 PROCEDURE — 96375 TX/PRO/DX INJ NEW DRUG ADDON: CPT

## 2022-07-26 RX ORDER — DIPHENHYDRAMINE HYDROCHLORIDE 50 MG/ML
50 INJECTION INTRAMUSCULAR; INTRAVENOUS AS NEEDED
Status: CANCELLED | OUTPATIENT
Start: 2022-07-26

## 2022-07-26 RX ORDER — PROCHLORPERAZINE MALEATE 10 MG
10 TABLET ORAL EVERY 6 HOURS PRN
Qty: 30 TABLET | Refills: 1 | Status: SHIPPED | OUTPATIENT
Start: 2022-07-26 | End: 2022-11-30

## 2022-07-26 RX ORDER — HEPARIN SODIUM (PORCINE) LOCK FLUSH IV SOLN 100 UNIT/ML 100 UNIT/ML
500 SOLUTION INTRAVENOUS AS NEEDED
Status: DISCONTINUED | OUTPATIENT
Start: 2022-07-26 | End: 2022-07-26 | Stop reason: HOSPADM

## 2022-07-26 RX ORDER — SODIUM CHLORIDE 0.9 % (FLUSH) 0.9 %
10 SYRINGE (ML) INJECTION AS NEEDED
Status: CANCELLED | OUTPATIENT
Start: 2022-07-26

## 2022-07-26 RX ORDER — HEPARIN SODIUM (PORCINE) LOCK FLUSH IV SOLN 100 UNIT/ML 100 UNIT/ML
500 SOLUTION INTRAVENOUS AS NEEDED
Status: CANCELLED | OUTPATIENT
Start: 2022-07-26

## 2022-07-26 RX ORDER — SODIUM CHLORIDE 0.9 % (FLUSH) 0.9 %
10 SYRINGE (ML) INJECTION AS NEEDED
Status: DISCONTINUED | OUTPATIENT
Start: 2022-07-26 | End: 2022-07-26 | Stop reason: HOSPADM

## 2022-07-26 RX ORDER — FAMOTIDINE 10 MG/ML
20 INJECTION, SOLUTION INTRAVENOUS ONCE
Status: COMPLETED | OUTPATIENT
Start: 2022-07-26 | End: 2022-07-26

## 2022-07-26 RX ORDER — FAMOTIDINE 10 MG/ML
20 INJECTION, SOLUTION INTRAVENOUS AS NEEDED
Status: CANCELLED | OUTPATIENT
Start: 2022-07-26

## 2022-07-26 RX ORDER — SODIUM CHLORIDE 9 MG/ML
250 INJECTION, SOLUTION INTRAVENOUS ONCE
Status: CANCELLED | OUTPATIENT
Start: 2022-07-26

## 2022-07-26 RX ORDER — FAMOTIDINE 10 MG/ML
20 INJECTION, SOLUTION INTRAVENOUS ONCE
Status: CANCELLED | OUTPATIENT
Start: 2022-07-26

## 2022-07-26 RX ORDER — SODIUM CHLORIDE 9 MG/ML
250 INJECTION, SOLUTION INTRAVENOUS ONCE
Status: COMPLETED | OUTPATIENT
Start: 2022-07-26 | End: 2022-07-26

## 2022-07-26 RX ADMIN — DIPHENHYDRAMINE HYDROCHLORIDE 50 MG: 50 INJECTION, SOLUTION INTRAMUSCULAR; INTRAVENOUS at 11:09

## 2022-07-26 RX ADMIN — DOCETAXEL 135 MG: 20 INJECTION, SOLUTION, CONCENTRATE INTRAVENOUS at 12:09

## 2022-07-26 RX ADMIN — SODIUM CHLORIDE 250 ML: 9 INJECTION, SOLUTION INTRAVENOUS at 11:07

## 2022-07-26 RX ADMIN — FAMOTIDINE 20 MG: 10 INJECTION, SOLUTION INTRAVENOUS at 11:07

## 2022-07-26 RX ADMIN — DENOSUMAB 120 MG: 120 INJECTION SUBCUTANEOUS at 13:04

## 2022-07-26 RX ADMIN — Medication 10 ML: at 13:14

## 2022-07-26 RX ADMIN — Medication 500 UNITS: at 13:14

## 2022-07-26 NOTE — NURSING NOTE
Pt had chemo education with Basia EDMOND on7/25, she voiced that she signed consent with her education. Pt asking for nausea medication at home, she states that zofran doesn't typically work for her, reviewed with Dr Alis warner compazine 10 mg po q6hrs prn.  Medication routed to pt's pharmacy.   CMP reviewed with Dr Snell, BUN/Creat remain elevated, no additional IVF's today.  Educate pt to call office should she develop N/V or decreased PO intake.  Updated pt and spouse, they v/u.  Pt has f/u scheduled in one week with Dr Snell.

## 2022-07-26 NOTE — TELEPHONE ENCOUNTER
Clinical Case Management/Zechariah:  Telephone     OSW received referral from KORI Breaux to check in with this patient given updated NCCN Distress score of 10/10. OSW called patient. Patient shared that now was not a good time to talk but that she was open to meeting in person at her next appointment. This OSW will plan to see patient on 8/2/22 in person. Patient aware and in agreement.     BHARATHI MarrufoW  Oncology Social Worker   Carl/Kerry

## 2022-07-29 ENCOUNTER — TELEPHONE (OUTPATIENT)
Dept: ONCOLOGY | Facility: CLINIC | Age: 57
End: 2022-07-29

## 2022-07-29 DIAGNOSIS — C34.90 NON-SMALL CELL LUNG CANCER METASTATIC TO ADRENAL GLAND: Primary | ICD-10-CM

## 2022-07-29 DIAGNOSIS — C79.70 NON-SMALL CELL LUNG CANCER METASTATIC TO ADRENAL GLAND: Primary | ICD-10-CM

## 2022-07-29 LAB — REF LAB TEST METHOD: NORMAL

## 2022-07-29 RX ORDER — MORPHINE SULFATE 30 MG/1
30 TABLET, FILM COATED, EXTENDED RELEASE ORAL 2 TIMES DAILY
Qty: 60 TABLET | Refills: 0 | Status: SHIPPED | OUTPATIENT
Start: 2022-07-29 | End: 2022-07-29

## 2022-07-29 RX ORDER — MORPHINE SULFATE 30 MG/1
30 TABLET, FILM COATED, EXTENDED RELEASE ORAL 2 TIMES DAILY
Qty: 60 TABLET | Refills: 0 | Status: SHIPPED | OUTPATIENT
Start: 2022-07-29 | End: 2022-08-02 | Stop reason: SDUPTHER

## 2022-07-29 NOTE — TELEPHONE ENCOUNTER
Follow up call to Holli today. Jelani contacted me yesterday and reported Holli's blood pressure was elevated and her face and neck were red.  He reports she took a lisinopril.  She reports today her BP is normal. He also notes she's had increased back pain. She has been taking 2 of her pain pills with better releif.  Reviewed with Dr Snell, will send in MS COntin 30mg BID.  I instructed Holli to take the MS Contin scheduled every twelve hours and use the percocet only if needed.  She will see Dr Snell back on Tuesday in follow up.  She v/u.  Reviewed this all with Jelani as well.

## 2022-08-02 ENCOUNTER — OFFICE VISIT (OUTPATIENT)
Dept: ONCOLOGY | Facility: CLINIC | Age: 57
End: 2022-08-02

## 2022-08-02 ENCOUNTER — LAB (OUTPATIENT)
Dept: LAB | Facility: HOSPITAL | Age: 57
End: 2022-08-02

## 2022-08-02 ENCOUNTER — APPOINTMENT (OUTPATIENT)
Dept: ONCOLOGY | Facility: HOSPITAL | Age: 57
End: 2022-08-02

## 2022-08-02 VITALS
HEART RATE: 72 BPM | HEIGHT: 63 IN | RESPIRATION RATE: 16 BRPM | OXYGEN SATURATION: 97 % | DIASTOLIC BLOOD PRESSURE: 82 MMHG | TEMPERATURE: 97.2 F | SYSTOLIC BLOOD PRESSURE: 115 MMHG | WEIGHT: 166.2 LBS | BODY MASS INDEX: 29.45 KG/M2

## 2022-08-02 DIAGNOSIS — C79.51 NON-SMALL CELL LUNG CANCER METASTATIC TO BONE: ICD-10-CM

## 2022-08-02 DIAGNOSIS — Z17.0 MALIGNANT NEOPLASM OF UPPER-OUTER QUADRANT OF RIGHT BREAST IN FEMALE, ESTROGEN RECEPTOR POSITIVE: ICD-10-CM

## 2022-08-02 DIAGNOSIS — C79.70 NON-SMALL CELL LUNG CANCER METASTATIC TO ADRENAL GLAND: ICD-10-CM

## 2022-08-02 DIAGNOSIS — C50.411 MALIGNANT NEOPLASM OF UPPER-OUTER QUADRANT OF RIGHT BREAST IN FEMALE, ESTROGEN RECEPTOR POSITIVE: ICD-10-CM

## 2022-08-02 DIAGNOSIS — C34.90 NON-SMALL CELL LUNG CANCER METASTATIC TO ADRENAL GLAND: Primary | ICD-10-CM

## 2022-08-02 DIAGNOSIS — C34.90 NON-SMALL CELL LUNG CANCER METASTATIC TO ADRENAL GLAND: ICD-10-CM

## 2022-08-02 DIAGNOSIS — N17.9 ACUTE RENAL FAILURE, UNSPECIFIED ACUTE RENAL FAILURE TYPE: ICD-10-CM

## 2022-08-02 DIAGNOSIS — C79.70 NON-SMALL CELL LUNG CANCER METASTATIC TO ADRENAL GLAND: Primary | ICD-10-CM

## 2022-08-02 DIAGNOSIS — C34.90 NON-SMALL CELL LUNG CANCER METASTATIC TO BONE: ICD-10-CM

## 2022-08-02 LAB
ALBUMIN SERPL-MCNC: 3.8 G/DL (ref 3.5–5.2)
ALBUMIN SERPL-MCNC: 3.9 G/DL (ref 3.5–5.2)
ALBUMIN/GLOB SERPL: 1.1 G/DL (ref 1.1–2.4)
ALP SERPL-CCNC: 128 U/L (ref 38–116)
ALT SERPL W P-5'-P-CCNC: 20 U/L (ref 0–33)
ANION GAP SERPL CALCULATED.3IONS-SCNC: 11.3 MMOL/L (ref 5–15)
ANION GAP SERPL CALCULATED.3IONS-SCNC: 11.8 MMOL/L (ref 5–15)
AST SERPL-CCNC: 21 U/L (ref 0–32)
BASOPHILS # BLD AUTO: 0.02 10*3/MM3 (ref 0–0.2)
BASOPHILS NFR BLD AUTO: 3.9 % (ref 0–1.5)
BILIRUB SERPL-MCNC: 0.2 MG/DL (ref 0.2–1.2)
BUN SERPL-MCNC: 15 MG/DL (ref 6–20)
BUN SERPL-MCNC: 15 MG/DL (ref 6–20)
BUN/CREAT SERPL: 11.3 (ref 7.3–30)
BUN/CREAT SERPL: 11.4 (ref 7.3–30)
CALCIUM SPEC-SCNC: 7.8 MG/DL (ref 8.5–10.2)
CALCIUM SPEC-SCNC: 7.8 MG/DL (ref 8.5–10.2)
CHLORIDE SERPL-SCNC: 107 MMOL/L (ref 98–107)
CHLORIDE SERPL-SCNC: 107 MMOL/L (ref 98–107)
CO2 SERPL-SCNC: 18.2 MMOL/L (ref 22–29)
CO2 SERPL-SCNC: 18.7 MMOL/L (ref 22–29)
CREAT SERPL-MCNC: 1.32 MG/DL (ref 0.6–1.1)
CREAT SERPL-MCNC: 1.33 MG/DL (ref 0.6–1.1)
DEPRECATED RDW RBC AUTO: 46.5 FL (ref 37–54)
EGFRCR SERPLBLD CKD-EPI 2021: 46.2 ML/MIN/1.73
EGFRCR SERPLBLD CKD-EPI 2021: 46.6 ML/MIN/1.73
EOSINOPHIL # BLD AUTO: 0.02 10*3/MM3 (ref 0–0.4)
EOSINOPHIL NFR BLD AUTO: 3.9 % (ref 0.3–6.2)
ERYTHROCYTE [DISTWIDTH] IN BLOOD BY AUTOMATED COUNT: 12.7 % (ref 12.3–15.4)
GLOBULIN UR ELPH-MCNC: 3.6 GM/DL (ref 1.8–3.5)
GLUCOSE SERPL-MCNC: 123 MG/DL (ref 74–124)
GLUCOSE SERPL-MCNC: 123 MG/DL (ref 74–124)
HCT VFR BLD AUTO: 34.6 % (ref 34–46.6)
HGB BLD-MCNC: 10.4 G/DL (ref 12–15.9)
IMM GRANULOCYTES # BLD AUTO: 0.01 10*3/MM3 (ref 0–0.05)
IMM GRANULOCYTES NFR BLD AUTO: 2 % (ref 0–0.5)
LYMPHOCYTES # BLD AUTO: 0.26 10*3/MM3 (ref 0.7–3.1)
LYMPHOCYTES NFR BLD AUTO: 51 % (ref 19.6–45.3)
MAGNESIUM SERPL-MCNC: 1.8 MG/DL (ref 1.8–2.5)
MCH RBC QN AUTO: 30.1 PG (ref 26.6–33)
MCHC RBC AUTO-ENTMCNC: 30.1 G/DL (ref 31.5–35.7)
MCV RBC AUTO: 100 FL (ref 79–97)
MONOCYTES # BLD AUTO: 0.12 10*3/MM3 (ref 0.1–0.9)
MONOCYTES NFR BLD AUTO: 23.5 % (ref 5–12)
NEUTROPHILS NFR BLD AUTO: 0.08 10*3/MM3 (ref 1.7–7)
NEUTROPHILS NFR BLD AUTO: 15.7 % (ref 42.7–76)
NRBC BLD AUTO-RTO: 0 /100 WBC (ref 0–0.2)
PHOSPHATE SERPL-MCNC: 1.2 MG/DL (ref 2.5–4.5)
PLATELET # BLD AUTO: 193 10*3/MM3 (ref 140–450)
PMV BLD AUTO: 9.8 FL (ref 6–12)
POTASSIUM SERPL-SCNC: 4.5 MMOL/L (ref 3.5–4.7)
POTASSIUM SERPL-SCNC: 4.5 MMOL/L (ref 3.5–4.7)
PROT SERPL-MCNC: 7.4 G/DL (ref 6.3–8)
RBC # BLD AUTO: 3.46 10*6/MM3 (ref 3.77–5.28)
SODIUM SERPL-SCNC: 137 MMOL/L (ref 134–145)
SODIUM SERPL-SCNC: 137 MMOL/L (ref 134–145)
URATE SERPL-MCNC: 3.3 MG/DL (ref 2.8–7.4)
WBC NRBC COR # BLD: 0.51 10*3/MM3 (ref 3.4–10.8)

## 2022-08-02 PROCEDURE — 84100 ASSAY OF PHOSPHORUS: CPT

## 2022-08-02 PROCEDURE — 83735 ASSAY OF MAGNESIUM: CPT

## 2022-08-02 PROCEDURE — 84550 ASSAY OF BLOOD/URIC ACID: CPT

## 2022-08-02 PROCEDURE — 99214 OFFICE O/P EST MOD 30 MIN: CPT | Performed by: INTERNAL MEDICINE

## 2022-08-02 PROCEDURE — 36415 COLL VENOUS BLD VENIPUNCTURE: CPT

## 2022-08-02 PROCEDURE — 80053 COMPREHEN METABOLIC PANEL: CPT

## 2022-08-02 PROCEDURE — 85025 COMPLETE CBC W/AUTO DIFF WBC: CPT

## 2022-08-02 RX ORDER — MORPHINE SULFATE 15 MG/1
15 TABLET, FILM COATED, EXTENDED RELEASE ORAL 2 TIMES DAILY
Qty: 60 TABLET | Refills: 0 | Status: SHIPPED | OUTPATIENT
Start: 2022-08-02 | End: 2022-08-09 | Stop reason: HOSPADM

## 2022-08-02 NOTE — PROGRESS NOTES
Subjective   Holli Patel is a 57 y.o. female.  Referred by Dr. Molina for bilateral breast cancer    History of Present Illness   Ms. 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    3/1/2021-bilateral screening mammogram-microcalcifications seen in the posterior one third of the lateral aspect of the right breast.  Area of focal asymmetry seen in the middle one third of the upper inner quadrant of the left breast.    3/1/2021-DEXA scan-T score of -2.2 on the lumbar spine and T score of -2.3 in the left hip and T score of -1.9 in the right hip.  Findings consistent with osteopenia    3/23/2021-screening lung CT-there is a 10 x 11 mm solid nodule in the left upper lobe.  Enlarged AP window lymph node measuring 14 x 10 mm.  Suggestion of a possible 9 mm left hilar lymph node.  Heavy coronary artery calcification.    3/23/2021-diagnostic mammogram bilateral-cluster of microcalcifications in the middle third lateral aspect of the right breast.  Left breast demonstrates persistence of the area of focal asymmetry in the region.    Ultrasound-left breast ultrasound at 10 o'clock position, 6 cm from the nipple there is a 0.4 cm irregular hypoechoic lesion.  Stereotactic biopsy of the right breast calcifications recommended.  Ultrasound-guided biopsy of the left breast lesion recommended    4/7/2021-right breast stereotactic biopsy and left breast ultrasound-guided biopsy  Pathology  Right breast-invasive ductal carcinoma, grade 2, lymphovascular invasion present, ER +99% strong, CT +80% moderate, HER-2 negative, Ki-67 12%  Left breast upper inner quadrant 10 o'clock position biopsy, invasive ductal carcinoma, grade   2, ER +99% strong, CT +40% strong, HER-2 2+ on immunohistochemistry, nonamplified on FISH Ki-67 10%    4/14/2021-PET/CT-FDG avid aortopulmonary window lymph node measures 0.9 cm with an SUV of 7.5.  FDG avid left hilar  lymph node measures 1 cm with an SUV of 17.2.  Irregular soft tissue density in the right breast measuring 3.7 x 3.8 cm is favored to be secondary to the recent breast biopsy.  1.1 cm pulmonary nodule within the left upper lobe with SUV 9.7 .  Sub-6 mm pulmonary nodules are present in the right lower lobe.  No evidence of lymphadenopathy or metastatic disease in the abdomen.  Focal area of FDG uptake within the central canal posterior to T11-T12 displaced demonstrating an SUV of 5.5 thought to be reactive however MRI is recommended for further evaluation.    She was seen by Dr. Molina who initially planned for breast surgery however  due to the PET abnormalities she has been referred to Dr. Kerr who plans to do a wound on 4/30/2021.  Dr. Molina's and Dr. Kerr's notes reviewed.    Patient denies any family history of breast cancer.  Her mother had lymphoma.  Maternal grandmother had colon cancer.  Prior to that screening mammogram she did not have any palpable abnormalities of either breast.    She has been a heavy smoker for about 40 years and smoked 2 packs/day.  Denies any recent weight changes, new bone pains, cough, abdominal pain nausea vomiting constipation or diarrhea.  She does have anxiety and has been on several medications.  She has recently been started on Xanax to help with the mood and also with insomnia.    Patient had a video-assisted thoracoscopy and mediastinal lymphadenectomy on 4/30/2021.  L5-L6 lymph nodes were biopsied however the small pulmonary nodule in the left upper lobe was not difficult to find.  Pathology showed moderately differentiated adenocarcinoma which is CK7 and TTF-1 positive.  Consistent with lung primary.  Ki-67 85%.    5/14/2021-MRI of the brain-minimal chronic small vessel ischemic change in the white matter.  Otherwise negative MRI.    5/20/2021-bilateral axillary ultrasound-no evidence of axillary lymphadenopathy in either axilla.  Normal-appearing bilateral axillary  lymph nodes visualized.    Cycle 1 cisplatin and Alimta on 5/25/2021.    Cycle 2 cisplatin Alimta 6/15/2021    Cycle 3 cisplatin Alimta 7/7/2021    Completed radiation on 7/12/2021    Port was nonfunctional hence a port study was performed which showed that the port was backed up into the innominate vein and also there was a nonocclusive thrombus at the end of the catheter extending into the superior vena cava.  She was started on anticoagulation with Eliquis.  It was recommended for port revision of the intention to keep the port.    PET/CT 8/5/2021-images independently reviewed and interpreted by me-decrease in size and FDG uptake of the left upper lobe pulmonary nodule as well as mediastinal and left hilar lymphadenopathy representing response to treatment.  Sub-6 mm pulmonary nodule in the left upper lobe new since 4/14/2021.  This could be related to radiation however follow-up CT in 3 months recommended.  Decrease in size of the irregular masslike tissue in the right breast which is postbiopsy hematoma.  This is not PET avid.  New segmental left eighth rib fractures healing.  A new band of sclerosis of the left seventh rib which favored to represent healing nondisplaced fracture.  Follow-up CT recommended.  focal uptake in the duodenum first and second portions.  Could be related to duodenitis.  Indeterminate lesion in the L1 vertebral body not well evaluated on PET/CT.    10/1/2021-MRI of the lumbar spine.  Lesion in the left posterior body of L1 measures 14 x 14 x 12 mm and previously 5 x 5 x 6 mm.  The interval enlargement strongly suggest that it is metastatic lesion.  No additional osseous metastasis noted.   Other chronic changes noted.    10/14/2021-biopsy of the lumbar spine lesion-pathology consistent with poorly differentiated carcinoma.  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    10/26/2021-brain MRI-no evidence of  metastatic disease.    10/26/2021-bilateral diagnostic mammogram and ultrasound  Scattered fibroglandular density in both breast.  Postbiopsy hematoma with internal biopsy clip in the upper outer quadrant of the right breast, 8 cm from the nipple.  The hematoma size is decreased to 2.9 cm from 3.6 cm earlier.    Left breast-parenchymal density measuring 9 x 10 mm has markedly decreased.  Few adjacent calcifications which are unchanged.  No new abnormality in the left breast.  At 10:00 region there is some minimal adjacent hypoechoic texture which is difficult to measure but appears smaller since the previous exam.    10/28/2021-PET/CT  New L1 and L2 lytic bone metastasis annually intensely hypermetabolic focus at the left adrenal gland likely represents metastatic disease as well.  Slight decrease in size of the 0.9 x 0.7 cm left upper lobe pulmonary nodule.  There is hypermetabolic activity related to radiation pneumonitis.  The remainder of the nodule is photopenic.  Uncertain etiology of the more intense activity along the right lateral peripheral margin of the 2.6 cm postbiopsy density of the right breast.    She completed radiation to to the lumbar spine on 11/19/2021 11/22/2021-cycle 1 Alimta and Keytruda    3/23/2022-bilateral diagnostic mammogram and ultrasound  Complete resolution of the microcalcifications in the upper outer quadrant of the right breast and decrease in size of the postbiopsy hematoma.  No suspicious abnormalities in the right breast.  Benign-appearing calcifications at the site of malignancy in the left breast upper inner quadrant.  No other suspicious findings.    4/11/2022 PET/CT-there is new groundglass opacities in the left upper lobe which are most likely postradiation changes.  Other groundglass opacities in the left lung as well as the right lung remained stable.  Increased nodularity of the left adrenal gland with an SUV of 5.6, previously 3.5.    Increased uptake in the L1  vertebra in the right posterior aspect with an SUV of 3.6.  Left L1 sclerosis increased    Due to this the case was discussed in multidisciplinary conference.  The disease progression was significant in the left adrenal gland as well as the L1 vertebra.  Since there were only 2 areas of disease progression it has been decided to proceed with radiation to these 2 spots and continue on Keytruda and Alimta.    Patient also has had worsening of her kidney function.  We initially held treatment as of 5/31/2022 and creatinine bumped up to 2.0, BUN 23.  We then resumed therapy 6/11/2022 with Keytruda alone.  Patient was referred to nephrology.    6/6/2022-MRI of the spine-diffuse marrow replacement in the L1 vertebral body and inferior endplate concavity.  Suspicious for metastatic disease with pathological fracture in the inferior endplate.  Also diffuse signal abnormality in the L2 lamina on the left suspicious for additional metastatic disease.  Abnormality of the first sacral segment suspicious for metastatic disease.  Left adrenal gland appears enlarged.  30 to 40% height loss and L2 vertebral body suggestive of a subacute compression fracture.  Degenerative changes.    7/18/2022-PET/CT  Multiple hypermetabolic osseous lesions with index lesions which have either increased in degree of FDG uptake or newly hypermetabolic lesions representative of metastatic disease and progression of disease.  Possible FDG avid lesion in the left femoral diaphysis however these are incompletely visualized and in the area of artifactual FDG uptake.  MRI of the left femur recommended for further evaluation.  Increasing FDG uptake of the left adrenal gland.  Focal left hilar hypermetabolic him corresponds to the lymph node which has minimally increased in size compared to April 2022.  New sub-6 mm pulmonary nodules.    Interval history:  Holli Patel is a 57 y.o. female with the above-mentioned history returns to the office today for  scheduled follow-up.    She received Taxotere about a week ago.  She tolerated the treatment overall fairly well.  She has some mild mucositis.  No worsening of neuropathy.  She experienced back pain for which MS Contin 30 mg twice daily was prescribed however she felt extremely drowsy after taking 1 dose and hence has not taken anymore.  She continues on the Percocet.  Reports a good appetite and has been eating and drinking well.  Guardant 360 has been resulted and there is no actionable mutations detected.    The following portions of the patient's history were reviewed and updated as appropriate: allergies, current medications, past family history, past medical history, past social history, past surgical history and problem list.    Past Medical History:   Diagnosis Date   • Anxiety    • Clot     POSSIBLE BLOOD CLOT IN VENOUS ACCESS DEVICE-PT STATES WAS STARTED ON ELIQUIS    • Dyspnea on exertion    • Elevated cholesterol    • Frequent urination     DAY AND NIGHT   • SHAYY (generalized anxiety disorder)     RELATED HIGH BLOOD PRESSURE   • GERD (gastroesophageal reflux disease)    • High blood pressure     ANXIETY RELATED   • Hyperlipidemia    • Hypothyroidism    • Lung cancer (HCC)    • Lung nodule     LEFT   • Malignant neoplasm of upper-outer quadrant of right breast in female, estrogen receptor positive (HCC) 4/13/2021    PT STATES HAS BILAT BREAST CANCER   • Migraine    • PONV (postoperative nausea and vomiting)         Past Surgical History:   Procedure Laterality Date   • BREAST BIOPSY Bilateral 04/07/2021    Right Breast 10 o'clock & Left breast 10 o'clock 6 cm from nipple, [facility]   • CLOSED REDUCTION HIP DISLOCATION Left 4/30/2021    Procedure: BRONCHOSCOPY, LEFT VIDEO ASSITED THORACOSCOPY, ROBOTIC ASSSITED MEDIASTINAL LYMPHADENECTOMY WITH INTERCOSTAL NERVE BLOCKS;  Surgeon: Raphael Kerr III, MD;  Location: [facility];  Service: [facility];  Laterality: Left;   • COLONOSCOPY     • TUBAL ABDOMINAL  "LIGATION     • VENOUS ACCESS DEVICE (PORT) INSERTION Right 2021    Procedure: INSERTION VENOUS ACCESS DEVICE;  Surgeon: Raphael Kerr III, MD;  Location: [facility] MAIN OR;  Service: Thoracic;  Laterality: Right;   • VENOUS ACCESS DEVICE (PORT) INSERTION Right 2021    Procedure: REVISION AND REPLACEMENT RIGHT SUBCLAVIAN VENOUS ACCESS PORT;  Surgeon: Raphael Kerr III, MD;  Location: [facility] MAIN OR;  Service: Thoracic;  Laterality: Right;   • WRIST SURGERY Right         Family History   Problem Relation Age of Onset   • Cancer Father         LYMPHATIC   • Prostate cancer Father    • Lymphoma Father    • Alcohol abuse Brother    • Colon cancer Maternal Grandmother    • Malig Hyperthermia Neg Hx         Social History     Socioeconomic History   • Marital status:      Spouse name: Jelani   Tobacco Use   • Smoking status: Former Smoker     Packs/day: 1.00     Years: 30.00     Pack years: 30.00     Types: Electronic Cigarette, Cigarettes     Start date: 1981     Quit date:      Years since quittin.5   • Smokeless tobacco: Never Used   • Tobacco comment: ABT 15 CIGARETTES DAILY   Vaping Use   • Vaping Use: Some days   • Substances: Nicotine, Flavoring   • Devices: Disposable   Substance and Sexual Activity   • Alcohol use: Never   • Drug use: Never   • Sexual activity: Yes     Partners: Male        OB History        2    Para   2    Term   2            AB        Living           SAB        IAB        Ectopic        Molar        Multiple        Live Births                Age of menarche-10  Age at menopause-42  Oral contraceptive pill use-15 years  No HRT use  She has 2 children  Age at first live childbirth-17    No Known Allergies     Review of Systems   Review of systems as mentioned in the HPI    Objective   Blood pressure 115/82, pulse 72, temperature 97.2 °F (36.2 °C), temperature source Temporal, resp. rate 16, height 160 cm (62.99\"), weight 75.4 kg (166 lb 3.2 oz), " SpO2 97 %, not currently breastfeeding.       Physical Exam  Vitals reviewed.   HENT:      Head: Normocephalic.   Eyes:      Pupils: Pupils are equal, round, and reactive to light.   Cardiovascular:      Rate and Rhythm: Normal rate and regular rhythm.      Pulses: Normal pulses.      Heart sounds: Normal heart sounds.   Pulmonary:      Effort: Pulmonary effort is normal.      Breath sounds: Normal breath sounds.   Abdominal:      General: Abdomen is flat.   Musculoskeletal:         General: No swelling. Normal range of motion.      Cervical back: Normal range of motion.   Skin:     General: Skin is warm.   Neurological:      General: No focal deficit present.      Mental Status: She is alert and oriented to person, place, and time.   Psychiatric:         Mood and Affect: Mood normal.         Behavior: Behavior normal.       I have reexamined the patient and the results are consistent with the previously documented exam. Salma Snell MD     Results from last 7 days   Lab Units 08/02/22  1125   WBC 10*3/mm3 0.51*   NEUTROS ABS 10*3/mm3 0.08*   HEMOGLOBIN g/dL 10.4*   HEMATOCRIT % 34.6   PLATELETS 10*3/mm3 193                  NM PET/CT Skull Base to Mid Thigh    Result Date: 7/19/2022  1.  Multiple hypermetabolic osseous lesions with index lesions which have either increased in degree of FDG uptake or are newly hypermetabolic likely represent metastatic disease. Findings of a probable FDG avid lesion within the left femoral diaphysis; however findings are incompletely visualized and in an area of artifactual FDG uptake. Further evaluation with MRI of the left femur with and without contrast is recommended for confirmation as a metastatic lesion in this area would place this patient at risk for pathologic fracture. 2.  Interval increase in the degree of FDG uptake within the left adrenal gland likely represents metastatic disease. 3.   Focal left hilar hypermetabolism appears to correspond with a lymph node which  is minimally increased in size since 04/11/2022. While findings may be reactive, they are most concerning for metastatic disease given the above findings. 4.  A few new bilateral sub-6 mm pulmonary nodules, indeterminate. Follow-up with chest CT in 3 months is recommended to exclude metastatic disease. 5.  Continued evolution of presumed postradiation changes within the perihilar aspect of the left mid and upper lung with interval decrease in extent of previously seen patchy groundglass opacification within the left lung. Small left pleural effusion, as before. Continued attention on follow-up of these areas recommended to exclude residual neoplasm. 6.  Other findings as above  This report was finalized on 7/19/2022 9:52 AM by Dr. Serjio Lorenz M.D.        7/18/2022-PET/CT images independently reviewed interpreted by me.  Detail summarized in the HPI    Assessment/Plan      *Bilateral breast cancer  · Invasive ductal carcinoma in both right and left breast and lesions measure under 1 cm.  No palpable lymphadenopathy although unsure if this has been evaluated by an axillary ultrasound.  No abnormal axillary lymphadenopathy noted on PET.  · Most likely clinical T1b N0 M0, both cancers were noted to be grade 2, ER/LA positive and HER-2 negative and Ki-67 less than 15%  VATS on 4/30/2021  Biopsy confirms adenocarcinoma of pulmonary primary  Discussed with Dr. Molina about delaying breast surgery and she is in agreement  Darted on neoadjuvant anastrozole  · Patient experienced significant adverse effects with anastrozole  · Treatment changed to letrozole   · Axillary ultrasound 4/20/2021 -negative for any axillary lymphadenopathy  · Started letrozole in May 2021  · She continues on letrozole without any problems.  · 8/24/2021 bilateral diagnostic mammogram and ultrasound.  In the left breast the tumor now measures 1 cm on mammogram as opposed to 0.8 cm in March.  Right breast calcifications also noted to be in a large  extent although the number seems to be decreased.  Hematoma has decreased in size.  · 10/26/2021-bilateral diagnostic mammogram and ultrasound-decrease in size of the bilateral breast lesions.  · 10/26/2021-PET/CT-metastatic lesions in the left adrenal gland, L1-L2.  · 10/26/2021-MRI of the brain-no evidence of metastatic disease  · 3/23/2022-bilateral diagnostic mammogram and ultrasound show complete resolution of the tumors.  · Continue letrozole, no evidence of disease progression    *Adenocarcinoma of the left lung, metastatic   · Most likely stage T1 N2 M0, stage III  · MRI of the thoracic spine ordered to further evaluate the abnormality seen on PET  · MRI of the brain negative   · Given that she is only 55 and fairly good performance status I discussed with her by with concurrent chemoradiation with cisplatin and Alimta every 3 weeks followed by durvalumab.  · Adverse effects including but not limited to myelosuppression, increased risk of infections, nausea, vomiting, renal dysfunction, ototoxicity, neurotoxicity have been discussed at length.  · MRI of the thoracic spine performed 5/24/2021 shows no evidence of metastatic disease in the thoracic spine however in L1 there is a 7 mm lesion which is indeterminate.  A lumbar spine MRI has been recommended.  · Discussed her case with Dr. Oates and he feels like MRI of the lumbar spine may also show that this lesion is indeterminate.  · Discussed the same with the patient and her .  · Even if this is metastatic disease this might be the only lesion of metastatic disease and then would consider this oligometastatic disease.  · Therefore plan to proceed with concurrent chemoradiation as scheduled.  · cisplatin and Alimta, C1D1 5/25/2021  · Day 1 of radiation 5/25/2021  · 5/27/2021-MRI of the lumbar spine-there is a 6 x 5 x 6 mm enhancing lesion in the left posterior body of L1 vertebra.  This is considered indeterminate.  Follow-up recommended.  Degenerative  changes noted at L5-S1  · 7/7/2021, cycle 3 cisplatin Alimta  · 7/12/2021-radiation completed  · PET/CT 8/6/2021 with good response to chemoradiation.  · MRI of the lumbar spine 10/1/2021 with increase in size of the L1 lesion which now measures 14 mm as opposed to 6 mm in May 2021.  · 10/14/2021-biopsy of the L1 lesion and pathology consistent with adenocarcinoma of the lung, TPS 0  · 0/26/2021-PET/CT-metastatic lesions in the left adrenal gland, L1-L2.  · 10/26/2021-MRI of the brain-no evidence of metastatic disease  · Given that she only has metastatic disease in L1-L2 and left adrenal gland I think it would be possible to radiate all these regions.  · Completed radiation to L1-L2 on 11/19/2021  · 11/22/2021-cycle 1 of Keytruda and Alimta  · Completed radiation to the left adrenal gland  · 1/18/2022-PET/CT-images independently reviewed and interpreted by me.  Decrease in size of the 7 mm pulmonary nodule in the left upper lobe which previously measured 9 mm.  Radiation pneumonitis is nearly resolved.  Hypermetabolic activity at the L1-L2 metastasis has resolved.  No new suspicious metastatic disease.  · Guardant 360 circulating DNA level decreasing.  · 3/7/2022-patient is complaining of fluid retention.  · Thought to be secondary to Alimta and started on steroids and Lasix.  · Developed CHASITY on Lasix since Lasix discontinued.  · Alimta dose has been reduced.   · PET/CT 4/11/2022 concerning for disease progression in the left adrenal gland as well as L1 vertebra on the left.  · Case presented and discussed at the thoracic multidisciplinary conference.  The plan is to proceed with Keytruda and Alimta and radiation to the left adrenal gland and the L1 vertebra.  · She will be referred back to radiation oncology, she had to reschedule her appointment due to COVID-19 infection in the family.    · Evaluated by radiation oncology on 6/1/2022 and plan is to proceed with L1 and adrenal radiation.  MRI of the lumbar spine  performed 6/6/2022  · Read pending  · 6/7/22: GFR still low; hold Alimta but proceed with Keytruda   · 6/28/22:This does show concerning area at L1 as well as concerning enlarging left adrenal gland nodule.  This is consistent with prior imaging.  This MRI was obtained specifically to help radiation oncology decide if additional radiation is possible.   · Completed 5 treatments to the lumbar spine, 30 Armstrong.  Radiation completed on 7/14/2022  · PET/CT 7/18/2022-multiple areas of disease progression noted in the spine and in the right sacrum.  · Alimta has been on hold since 5/10/2022 due to renal dysfunction.  · She continued on Keytruda without any interruption.  · Although she was off Alimta for 2 months even prior to holding Alimta there was concern for disease progression on the PET/CT.  · Due to this reason we will discontinue Alimta and Keytruda and start Taxotere 75 mg per metered squared every 3 weeks.  · Adverse effects of Taxotere including but not limited to myelosuppression, increased risk of infections, nausea, vomiting, neuropathy, alopecia, infusion reactions, fluid retention discussed.  · Guardant 360,  7/20/2022 without any actionable mutations  · 7/26/2022-cycle 1 Taxotere  · 8/1/2022-CBC reviewed and noted to be neutropenic.  Afebrile.  No indication for antibiotics.  · Will try to get Neulasta approved for subsequent cycles    *Neutropenia  · 8/2/2022 WBC count 0.51 with an absolute neutrophil count of 0.08  · Secondary to Taxotere  · Will obtain Neulasta approved for subsequent cycles    *Back pain  · Continue Percocets as needed  · Decrease MS Contin dose to 15 mg twice daily.  Reports excessive drowsiness with 30 mg twice daily.    *Neuropathy in lower extremities  · Unchanged      *Fluid retention  · Resolved now that she is off Alimta    *CHASITY  · Most likely secondary to Lasix  · Discontinue Lasix  · 1 L normal saline 3/28/2022  · Kidney function improved, Creatinine 1.69 and BUN 23.   Encourage patient to increase oral intake.  Discouraged intake of caffeinated beverages.  Patient to return in 1 week for labs only around the time of her schedule PET scan appointment.  · 4/19/2022-creatinine 1.36.  · 5/10/2022-creatinine elevated at 1.96.  · 5/31/2022 creatinine elevated at 2.01  · 6/7/2022-creatinine 1.76. Alimta held, continue Keytruda  · 6/28/2022 creatinine 1.5, GFR 40.  Patient will see nephrology for new patient consultation 7/7/2022.  Continue to hold Alimta for now.  · 8/2/2022-creatinine stable at 1.3  · Continue follow-up with nephrology    *Anemia  · Hemoglobin 7.9   · Symptomatic  · 2 units of PRBC  · Hgb improved to 9.8 today   · 4/11/2022 hemoglobin stable at 8.8  · 5/31/2022-hemoglobin lower at 8.4  · 6/7/2022-hemoglobin improved at 8.8  · 6/28/2022-hemoglobin improved up to 10.4 (likely related to holding Alimta)  · 8/2/2022-hemoglobin stable at 10.4.    *Bilateral breast cancer-no family history of breast cancer but given synchronous breast cancer genetic testing has been sent.  Genetic testing with a variant of unknown significance.    *Right hand numbness and difficulty with grasping objects  · Previously had history of carpal tunnel requiring repair.  · She is currently on letrozole which could cause worsening of the carpal tunnel  · Gabapentin dose will be increased to 600 mg 3 times daily  · She will also be referred to hand surgery for further evaluation.  · She now reports bilateral upper extremity numbness.  · Continue gabapentin  · MRI of the brain 10/26/2021 without any evidence of metastatic disease  · Not an issue today     *Anxiety-  · Currently on Xanax 1 mg nightly  · She required Valium for the MRI as she had claustrophobia and a panic attack  · Continue Zyprexa  · Mood stable    *Depression  · Continue Zyprexa  · Related to cancer diagnosis and current situation where she is having to care for her elderly mother.  · Reports being extremely tired.  Continues to  struggle with depression.  · Stable    *Left-sided chest wall pain-secondary to VATS.  Most likely neuropathic.  Continue gabapentin and Norco as needed.  She ran out of gabapentin and experienced leg cramps.    Continue gabapentin  Improved    *Hypertension-  · Lisinopril discontinued due to CHASITY per above.    · Blood pressure normal at 115/82    *Smoking-not addressed today    *GERD  · 7/7/2021, patient complains of reflux despite the use of Prilosec 20 mg twice daily.  We will send her a prescription for Carafate slurry 4 times daily.  · She has not quite started using the Carafate.  · Continue Prilosec and Carafate  · PET/CT 4/11/2022 shows increased uptake in the proximal esophagus.  Likely secondary to GERD.  · Continue current medication    *Port not returning blood    · A new Mediport was placed by thoracic surgery 11/29/2021, Eliquis discontinued and right chest Mediport is now safe to use.      PLAN:  1. Discontinue Alimta and Keytruda  2. Start Taxotere  3. Repeat guardant 360  4. Follow-up with me in 2 weeks    Noted to have disease progression on PET/CT and we discussed starting new chemotherapy.  PET/CT independently reviewed and interpreted by me.  Patient is on treatment requiring close monitoring for toxicities.         Salma Snell MD

## 2022-08-03 ENCOUNTER — APPOINTMENT (OUTPATIENT)
Dept: GENERAL RADIOLOGY | Facility: HOSPITAL | Age: 57
End: 2022-08-03

## 2022-08-03 ENCOUNTER — HOSPITAL ENCOUNTER (INPATIENT)
Facility: HOSPITAL | Age: 57
LOS: 6 days | Discharge: HOME HEALTH | End: 2022-08-09
Attending: EMERGENCY MEDICINE | Admitting: HOSPITALIST

## 2022-08-03 ENCOUNTER — APPOINTMENT (OUTPATIENT)
Dept: CT IMAGING | Facility: HOSPITAL | Age: 57
End: 2022-08-03

## 2022-08-03 DIAGNOSIS — R53.1 WEAKNESS: ICD-10-CM

## 2022-08-03 DIAGNOSIS — R27.0 ATAXIA: ICD-10-CM

## 2022-08-03 DIAGNOSIS — F41.9 ANXIETY: ICD-10-CM

## 2022-08-03 DIAGNOSIS — R59.0 MEDIASTINAL ADENOPATHY: ICD-10-CM

## 2022-08-03 DIAGNOSIS — C79.51 NON-SMALL CELL LUNG CANCER METASTATIC TO BONE: ICD-10-CM

## 2022-08-03 DIAGNOSIS — R41.0 CONFUSION: Primary | ICD-10-CM

## 2022-08-03 DIAGNOSIS — C34.90 NON-SMALL CELL LUNG CANCER METASTATIC TO BONE: ICD-10-CM

## 2022-08-03 PROBLEM — E87.20 METABOLIC ACIDOSIS: Status: ACTIVE | Noted: 2022-08-03

## 2022-08-03 PROBLEM — T45.1X5A CHEMOTHERAPY-INDUCED NEUTROPENIA: Status: ACTIVE | Noted: 2022-08-03

## 2022-08-03 PROBLEM — D70.1 CHEMOTHERAPY-INDUCED NEUTROPENIA: Status: ACTIVE | Noted: 2022-08-03

## 2022-08-03 LAB
ALBUMIN SERPL ELPH-MCNC: 2.9 G/DL (ref 2.9–4.4)
ALBUMIN SERPL-MCNC: 3.7 G/DL (ref 3.5–5.2)
ALBUMIN/GLOB SERPL: 0.8 {RATIO} (ref 0.7–1.7)
ALBUMIN/GLOB SERPL: 1.1 G/DL
ALP SERPL-CCNC: 119 U/L (ref 39–117)
ALPHA1 GLOB SERPL ELPH-MCNC: 0.4 G/DL (ref 0–0.4)
ALPHA2 GLOB SERPL ELPH-MCNC: 1.4 G/DL (ref 0.4–1)
ALT SERPL W P-5'-P-CCNC: 15 U/L (ref 1–33)
ANION GAP SERPL CALCULATED.3IONS-SCNC: 10 MMOL/L (ref 5–15)
AST SERPL-CCNC: 16 U/L (ref 1–32)
B-GLOBULIN SERPL ELPH-MCNC: 1.1 G/DL (ref 0.7–1.3)
BACTERIA UR QL AUTO: NORMAL /HPF
BILIRUB SERPL-MCNC: 0.4 MG/DL (ref 0–1.2)
BILIRUB UR QL STRIP: NEGATIVE
BUN SERPL-MCNC: 11 MG/DL (ref 6–20)
BUN/CREAT SERPL: 9.8 (ref 7–25)
CALCIUM SPEC-SCNC: 7.5 MG/DL (ref 8.6–10.5)
CHLORIDE SERPL-SCNC: 104 MMOL/L (ref 98–107)
CLARITY UR: ABNORMAL
CO2 SERPL-SCNC: 19 MMOL/L (ref 22–29)
COLOR UR: YELLOW
CREAT SERPL-MCNC: 1.12 MG/DL (ref 0.57–1)
D-LACTATE SERPL-SCNC: 0.9 MMOL/L (ref 0.5–2)
DEPRECATED RDW RBC AUTO: 41.6 FL (ref 37–54)
EGFRCR SERPLBLD CKD-EPI 2021: 56.8 ML/MIN/1.73
EOSINOPHIL # BLD MANUAL: 0.01 10*3/MM3 (ref 0–0.4)
EOSINOPHIL NFR BLD MANUAL: 1 % (ref 0.3–6.2)
ERYTHROCYTE [DISTWIDTH] IN BLOOD BY AUTOMATED COUNT: 12 % (ref 12.3–15.4)
FERRITIN SERPL-MCNC: 276 NG/ML (ref 13–150)
GAMMA GLOB SERPL ELPH-MCNC: 1 G/DL (ref 0.4–1.8)
GLOBULIN SER-MCNC: 3.8 G/DL (ref 2.2–3.9)
GLOBULIN UR ELPH-MCNC: 3.3 GM/DL
GLUCOSE BLDC GLUCOMTR-MCNC: 104 MG/DL (ref 70–130)
GLUCOSE BLDC GLUCOMTR-MCNC: 204 MG/DL (ref 70–130)
GLUCOSE BLDC GLUCOMTR-MCNC: 213 MG/DL (ref 70–130)
GLUCOSE SERPL-MCNC: 101 MG/DL (ref 65–99)
GLUCOSE UR STRIP-MCNC: NEGATIVE MG/DL
HCT VFR BLD AUTO: 30.7 % (ref 34–46.6)
HGB BLD-MCNC: 9.6 G/DL (ref 12–15.9)
HGB RETIC QN AUTO: 31.7 PG (ref 29.8–36.1)
HGB UR QL STRIP.AUTO: NEGATIVE
HYALINE CASTS UR QL AUTO: NORMAL /LPF
IGA SERPL-MCNC: 168 MG/DL (ref 87–352)
IGG SERPL-MCNC: 1010 MG/DL (ref 586–1602)
IGM SERPL-MCNC: 30 MG/DL (ref 26–217)
IMM RETICS NFR: 9.7 % (ref 3–15.8)
INTERPRETATION SERPL IEP-IMP: ABNORMAL
IRON 24H UR-MRATE: 15 MCG/DL (ref 37–145)
IRON SATN MFR SERPL: 5 % (ref 20–50)
KAPPA LC FREE SER-MCNC: 25.1 MG/L (ref 3.3–19.4)
KAPPA LC FREE/LAMBDA FREE SER: 1.77 {RATIO} (ref 0.26–1.65)
KETONES UR QL STRIP: NEGATIVE
LABORATORY COMMENT REPORT: ABNORMAL
LAMBDA LC FREE SERPL-MCNC: 14.2 MG/L (ref 5.7–26.3)
LDH SERPL-CCNC: 365 U/L (ref 135–214)
LEUKOCYTE ESTERASE UR QL STRIP.AUTO: NEGATIVE
LYMPHOCYTES # BLD MANUAL: 0.22 10*3/MM3 (ref 0.7–3.1)
LYMPHOCYTES NFR BLD MANUAL: 58 % (ref 5–12)
M PROTEIN SERPL ELPH-MCNC: ABNORMAL G/DL
MCH RBC QN AUTO: 29.5 PG (ref 26.6–33)
MCHC RBC AUTO-ENTMCNC: 31.3 G/DL (ref 31.5–35.7)
MCV RBC AUTO: 94.5 FL (ref 79–97)
MONOCYTES # BLD: 0.39 10*3/MM3 (ref 0.1–0.9)
NEUTROPHILS # BLD AUTO: 0.05 10*3/MM3 (ref 1.7–7)
NEUTROPHILS NFR BLD MANUAL: 8 % (ref 42.7–76)
NITRITE UR QL STRIP: NEGATIVE
PH UR STRIP.AUTO: 7.5 [PH] (ref 5–8)
PLAT MORPH BLD: NORMAL
PLATELET # BLD AUTO: 169 10*3/MM3 (ref 140–450)
PMV BLD AUTO: 9.9 FL (ref 6–12)
POTASSIUM SERPL-SCNC: 4.5 MMOL/L (ref 3.5–5.2)
PROCALCITONIN SERPL-MCNC: 0.22 NG/ML (ref 0–0.25)
PROT SERPL-MCNC: 6.7 G/DL (ref 6–8.5)
PROT SERPL-MCNC: 7 G/DL (ref 6–8.5)
PROT UR QL STRIP: ABNORMAL
RBC # BLD AUTO: 3.25 10*6/MM3 (ref 3.77–5.28)
RBC # UR STRIP: NORMAL /HPF
RBC MORPH BLD: NORMAL
REF LAB TEST METHOD: NORMAL
RETICS # AUTO: 0.02 10*6/MM3 (ref 0.02–0.13)
RETICS/RBC NFR AUTO: 0.47 % (ref 0.7–1.9)
SARS-COV-2 RNA RESP QL NAA+PROBE: NOT DETECTED
SODIUM SERPL-SCNC: 133 MMOL/L (ref 136–145)
SP GR UR STRIP: 1.02 (ref 1–1.03)
SQUAMOUS #/AREA URNS HPF: NORMAL /HPF
TIBC SERPL-MCNC: 328 MCG/DL (ref 298–536)
TRANSFERRIN SERPL-MCNC: 220 MG/DL (ref 200–360)
UROBILINOGEN UR QL STRIP: ABNORMAL
VARIANT LYMPHS NFR BLD MANUAL: 33 % (ref 19.6–45.3)
WBC # UR STRIP: NORMAL /HPF
WBC MORPH BLD: NORMAL
WBC NRBC COR # BLD: 0.68 10*3/MM3 (ref 3.4–10.8)

## 2022-08-03 PROCEDURE — 83615 LACTATE (LD) (LDH) ENZYME: CPT | Performed by: INTERNAL MEDICINE

## 2022-08-03 PROCEDURE — 70450 CT HEAD/BRAIN W/O DYE: CPT

## 2022-08-03 PROCEDURE — 83540 ASSAY OF IRON: CPT | Performed by: INTERNAL MEDICINE

## 2022-08-03 PROCEDURE — 81001 URINALYSIS AUTO W/SCOPE: CPT | Performed by: PHYSICIAN ASSISTANT

## 2022-08-03 PROCEDURE — 84466 ASSAY OF TRANSFERRIN: CPT | Performed by: INTERNAL MEDICINE

## 2022-08-03 PROCEDURE — 83605 ASSAY OF LACTIC ACID: CPT | Performed by: PHYSICIAN ASSISTANT

## 2022-08-03 PROCEDURE — 82728 ASSAY OF FERRITIN: CPT | Performed by: INTERNAL MEDICINE

## 2022-08-03 PROCEDURE — G0378 HOSPITAL OBSERVATION PER HR: HCPCS

## 2022-08-03 PROCEDURE — 85025 COMPLETE CBC W/AUTO DIFF WBC: CPT | Performed by: PHYSICIAN ASSISTANT

## 2022-08-03 PROCEDURE — 25010000002 CEFEPIME PER 500 MG: Performed by: INTERNAL MEDICINE

## 2022-08-03 PROCEDURE — 85046 RETICYTE/HGB CONCENTRATE: CPT | Performed by: INTERNAL MEDICINE

## 2022-08-03 PROCEDURE — 85007 BL SMEAR W/DIFF WBC COUNT: CPT | Performed by: PHYSICIAN ASSISTANT

## 2022-08-03 PROCEDURE — 80053 COMPREHEN METABOLIC PANEL: CPT | Performed by: PHYSICIAN ASSISTANT

## 2022-08-03 PROCEDURE — 82962 GLUCOSE BLOOD TEST: CPT

## 2022-08-03 PROCEDURE — 25010000002 FILGRASTIM 300 MCG/0.5ML SOLUTION PREFILLED SYRINGE: Performed by: INTERNAL MEDICINE

## 2022-08-03 PROCEDURE — 84145 PROCALCITONIN (PCT): CPT | Performed by: PHYSICIAN ASSISTANT

## 2022-08-03 PROCEDURE — U0003 INFECTIOUS AGENT DETECTION BY NUCLEIC ACID (DNA OR RNA); SEVERE ACUTE RESPIRATORY SYNDROME CORONAVIRUS 2 (SARS-COV-2) (CORONAVIRUS DISEASE [COVID-19]), AMPLIFIED PROBE TECHNIQUE, MAKING USE OF HIGH THROUGHPUT TECHNOLOGIES AS DESCRIBED BY CMS-2020-01-R: HCPCS | Performed by: PHYSICIAN ASSISTANT

## 2022-08-03 PROCEDURE — 99223 1ST HOSP IP/OBS HIGH 75: CPT | Performed by: INTERNAL MEDICINE

## 2022-08-03 PROCEDURE — 99222 1ST HOSP IP/OBS MODERATE 55: CPT | Performed by: PSYCHIATRY & NEUROLOGY

## 2022-08-03 PROCEDURE — 87040 BLOOD CULTURE FOR BACTERIA: CPT | Performed by: HOSPITALIST

## 2022-08-03 PROCEDURE — 99284 EMERGENCY DEPT VISIT MOD MDM: CPT

## 2022-08-03 PROCEDURE — 71045 X-RAY EXAM CHEST 1 VIEW: CPT

## 2022-08-03 RX ORDER — BUPROPION HYDROCHLORIDE 150 MG/1
150 TABLET ORAL DAILY
Status: DISCONTINUED | OUTPATIENT
Start: 2022-08-03 | End: 2022-08-09 | Stop reason: HOSPADM

## 2022-08-03 RX ORDER — LEVOTHYROXINE SODIUM 0.03 MG/1
25 TABLET ORAL DAILY
Status: DISCONTINUED | OUTPATIENT
Start: 2022-08-03 | End: 2022-08-09 | Stop reason: HOSPADM

## 2022-08-03 RX ORDER — PROPRANOLOL HCL 60 MG
60 CAPSULE, EXTENDED RELEASE 24HR ORAL DAILY
Status: DISCONTINUED | OUTPATIENT
Start: 2022-08-03 | End: 2022-08-09 | Stop reason: HOSPADM

## 2022-08-03 RX ORDER — ASPIRIN 325 MG
325 TABLET ORAL DAILY
Status: DISCONTINUED | OUTPATIENT
Start: 2022-08-03 | End: 2022-08-04

## 2022-08-03 RX ORDER — SODIUM CHLORIDE 9 MG/ML
75 INJECTION, SOLUTION INTRAVENOUS CONTINUOUS
Status: DISCONTINUED | OUTPATIENT
Start: 2022-08-03 | End: 2022-08-04

## 2022-08-03 RX ORDER — ALPRAZOLAM 0.5 MG/1
0.5 TABLET ORAL 2 TIMES DAILY PRN
Status: DISCONTINUED | OUTPATIENT
Start: 2022-08-03 | End: 2022-08-07

## 2022-08-03 RX ORDER — ASPIRIN 300 MG/1
300 SUPPOSITORY RECTAL DAILY
Status: DISCONTINUED | OUTPATIENT
Start: 2022-08-03 | End: 2022-08-04

## 2022-08-03 RX ORDER — LETROZOLE 2.5 MG/1
2.5 TABLET, FILM COATED ORAL DAILY
Status: DISCONTINUED | OUTPATIENT
Start: 2022-08-03 | End: 2022-08-09 | Stop reason: HOSPADM

## 2022-08-03 RX ORDER — SODIUM CHLORIDE 0.9 % (FLUSH) 0.9 %
10 SYRINGE (ML) INJECTION EVERY 12 HOURS SCHEDULED
Status: DISCONTINUED | OUTPATIENT
Start: 2022-08-03 | End: 2022-08-09 | Stop reason: HOSPADM

## 2022-08-03 RX ORDER — GABAPENTIN 300 MG/1
600 CAPSULE ORAL EVERY 8 HOURS SCHEDULED
Status: DISCONTINUED | OUTPATIENT
Start: 2022-08-03 | End: 2022-08-07

## 2022-08-03 RX ORDER — DIAZEPAM 5 MG/ML
5 INJECTION, SOLUTION INTRAMUSCULAR; INTRAVENOUS ONCE
Status: COMPLETED | OUTPATIENT
Start: 2022-08-03 | End: 2022-08-04

## 2022-08-03 RX ORDER — PANTOPRAZOLE SODIUM 40 MG/1
40 TABLET, DELAYED RELEASE ORAL EVERY MORNING
Refills: 1 | Status: DISCONTINUED | OUTPATIENT
Start: 2022-08-04 | End: 2022-08-09 | Stop reason: HOSPADM

## 2022-08-03 RX ORDER — ONDANSETRON 4 MG/1
4 TABLET, FILM COATED ORAL EVERY 6 HOURS PRN
Status: DISCONTINUED | OUTPATIENT
Start: 2022-08-03 | End: 2022-08-09 | Stop reason: HOSPADM

## 2022-08-03 RX ORDER — ATORVASTATIN CALCIUM 20 MG/1
80 TABLET, FILM COATED ORAL NIGHTLY
Status: DISCONTINUED | OUTPATIENT
Start: 2022-08-03 | End: 2022-08-04

## 2022-08-03 RX ORDER — SODIUM CHLORIDE 0.9 % (FLUSH) 0.9 %
20 SYRINGE (ML) INJECTION AS NEEDED
Status: DISCONTINUED | OUTPATIENT
Start: 2022-08-03 | End: 2022-08-09 | Stop reason: HOSPADM

## 2022-08-03 RX ORDER — HEPARIN SODIUM (PORCINE) LOCK FLUSH IV SOLN 100 UNIT/ML 100 UNIT/ML
5 SOLUTION INTRAVENOUS AS NEEDED
Status: DISCONTINUED | OUTPATIENT
Start: 2022-08-03 | End: 2022-08-09 | Stop reason: HOSPADM

## 2022-08-03 RX ORDER — OXYCODONE HYDROCHLORIDE AND ACETAMINOPHEN 5; 325 MG/1; MG/1
1 TABLET ORAL EVERY 6 HOURS PRN
Status: DISCONTINUED | OUTPATIENT
Start: 2022-08-03 | End: 2022-08-09 | Stop reason: HOSPADM

## 2022-08-03 RX ORDER — SODIUM CHLORIDE 0.9 % (FLUSH) 0.9 %
10 SYRINGE (ML) INJECTION AS NEEDED
Status: DISCONTINUED | OUTPATIENT
Start: 2022-08-03 | End: 2022-08-04

## 2022-08-03 RX ORDER — SODIUM CHLORIDE 0.9 % (FLUSH) 0.9 %
10 SYRINGE (ML) INJECTION AS NEEDED
Status: DISCONTINUED | OUTPATIENT
Start: 2022-08-03 | End: 2022-08-09 | Stop reason: HOSPADM

## 2022-08-03 RX ORDER — FLUOXETINE HYDROCHLORIDE 20 MG/1
40 CAPSULE ORAL DAILY
Status: DISCONTINUED | OUTPATIENT
Start: 2022-08-03 | End: 2022-08-09 | Stop reason: HOSPADM

## 2022-08-03 RX ORDER — OLANZAPINE 5 MG/1
5 TABLET ORAL NIGHTLY
Status: DISCONTINUED | OUTPATIENT
Start: 2022-08-03 | End: 2022-08-07

## 2022-08-03 RX ORDER — ONDANSETRON 2 MG/ML
4 INJECTION INTRAMUSCULAR; INTRAVENOUS EVERY 6 HOURS PRN
Status: DISCONTINUED | OUTPATIENT
Start: 2022-08-03 | End: 2022-08-09 | Stop reason: HOSPADM

## 2022-08-03 RX ORDER — ACETAMINOPHEN 325 MG/1
650 TABLET ORAL EVERY 4 HOURS PRN
Status: DISCONTINUED | OUTPATIENT
Start: 2022-08-03 | End: 2022-08-09 | Stop reason: HOSPADM

## 2022-08-03 RX ORDER — FOLIC ACID 1 MG/1
1 TABLET ORAL DAILY
Status: DISCONTINUED | OUTPATIENT
Start: 2022-08-03 | End: 2022-08-09 | Stop reason: HOSPADM

## 2022-08-03 RX ORDER — PROCHLORPERAZINE MALEATE 10 MG
10 TABLET ORAL EVERY 6 HOURS PRN
Status: DISCONTINUED | OUTPATIENT
Start: 2022-08-03 | End: 2022-08-09 | Stop reason: HOSPADM

## 2022-08-03 RX ADMIN — GABAPENTIN 600 MG: 300 CAPSULE ORAL at 15:40

## 2022-08-03 RX ADMIN — OXYCODONE AND ACETAMINOPHEN 1 TABLET: 5; 325 TABLET ORAL at 21:31

## 2022-08-03 RX ADMIN — GABAPENTIN 600 MG: 300 CAPSULE ORAL at 21:31

## 2022-08-03 RX ADMIN — CEFEPIME 2 G: 2 INJECTION, POWDER, FOR SOLUTION INTRAVENOUS at 17:11

## 2022-08-03 RX ADMIN — PROPRANOLOL HYDROCHLORIDE 60 MG: 60 CAPSULE, EXTENDED RELEASE ORAL at 15:40

## 2022-08-03 RX ADMIN — SODIUM CHLORIDE 75 ML/HR: 9 INJECTION, SOLUTION INTRAVENOUS at 16:10

## 2022-08-03 RX ADMIN — FILGRASTIM 300 MCG: 300 INJECTION, SOLUTION INTRAVENOUS; SUBCUTANEOUS at 18:58

## 2022-08-03 RX ADMIN — ASPIRIN 325 MG: 325 TABLET ORAL at 15:40

## 2022-08-03 RX ADMIN — Medication 10 ML: at 21:34

## 2022-08-03 RX ADMIN — Medication 10 ML: at 08:13

## 2022-08-03 RX ADMIN — Medication 1 MG: at 15:41

## 2022-08-03 RX ADMIN — ATORVASTATIN CALCIUM 80 MG: 20 TABLET, FILM COATED ORAL at 20:26

## 2022-08-03 RX ADMIN — LETROZOLE 2.5 MG: 2.5 TABLET ORAL at 15:40

## 2022-08-03 RX ADMIN — FLUOXETINE HYDROCHLORIDE 40 MG: 20 CAPSULE ORAL at 15:41

## 2022-08-03 RX ADMIN — LEVOTHYROXINE SODIUM 25 MCG: 0.03 TABLET ORAL at 15:41

## 2022-08-03 RX ADMIN — ALPRAZOLAM 0.5 MG: 0.5 TABLET ORAL at 21:31

## 2022-08-03 RX ADMIN — OLANZAPINE 5 MG: 5 TABLET ORAL at 20:26

## 2022-08-03 NOTE — ED TRIAGE NOTES
Pt presents to the ER this morning from home via pvt car.  Pt states she has been having increased pain and takes pain medication routinely for bone cancer, her CA MD ordered her Morphine to take in the am and pm and it made her confused so she stopped taking it on Wednesday.  She is c/o of being off balance for the last 5 days, pt states every time she stands up she falls backwards and that is what she wants to be seen for.

## 2022-08-03 NOTE — ED PROVIDER NOTES
EMERGENCY DEPARTMENT ENCOUNTER    Room Number:  P381/1  Date of encounter:  8/3/2022  PCP: Vandana Gastelum MD  Historian: Patient, spouse      I used full protective equipment while examining this patient.  This includes face mask, gloves and protective eyewear.  I washed my hands before entering the room and immediately upon leaving the room      HPI:  Chief Complaint: Confusion, ataxia  A complete HPI/ROS/PMH/PSH/SH/FH are unobtainable due to: Nothing    Context: Holli Patel is a 57 y.o. female who presents to the ED c/o 2-day history of gradual onset, intermittent ataxia, confusion.  Patient states she feels like she is off balance and has difficulty walking.  This was not an issue yesterday.  She was able to walk into the doctor's office yesterday on her own power.  Now she cannot stand without help.  She denies any unilateral weakness.  Her  states she has been confused over the past several days.  He denies any aphasia or dysarthria.  She denies any fevers, chills, cough, dysuria.  Patient following with oncology for history of breast and lung cancer.  Patient started on Taxotere on 7/26/2022.  Patient recently had progression of cancer on Alimta and Keytruda.    Review of Medical Records  Reviewed patient's oncology office visit from yesterday.  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    Labs from yesterday show neutropenia, with ANC of 0.08.  Secondary to Taxotere.    PAST MEDICAL HISTORY  Active Ambulatory Problems     Diagnosis Date Noted   • History of gastroesophageal reflux (GERD) 04/01/2015   • Primary insomnia 03/02/2020   • Mixed hyperlipidemia 03/02/2020   • Other specified hypothyroidism 03/02/2020   • Arthritis 03/02/2020   • Anxiety 06/09/2020   • History of migraine 09/29/2020   • Essential hypertension 11/24/2020   • History of colon polyps 03/15/2021   • Diverticulosis 03/15/2021   • Leukocytosis  03/15/2021   • Personal history of tobacco use 03/15/2021   • Osteopenia 03/15/2021   • Dyspnea 03/15/2021   • Malignant neoplasm of upper-outer quadrant of right breast in female, estrogen receptor positive (HCC) 04/13/2021   • Malignant neoplasm of upper-inner quadrant of left breast in female, estrogen receptor positive (HCC) 04/13/2021   • Primary adenocarcinoma of upper lobe of left lung (HCC) 05/18/2021   • Encounter for fitting and adjustment of vascular catheter 05/19/2021   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) 11/01/2021   • 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) 11/01/2021   • History of bilateral breast cancer 11/16/2021   • History of DVT (deep vein thrombosis) 11/16/2021   • Stage 3a chronic kidney disease (HCC) 02/16/2022     Resolved Ambulatory Problems     Diagnosis Date Noted   • SHAYY (generalized anxiety disorder) 03/02/2020   • Gastroesophageal reflux disease without esophagitis 06/09/2020   • Routine health maintenance 06/09/2020   • Primary osteoarthritis involving multiple joints 06/09/2020   • Intractable migraine with aura without status migrainosus 09/29/2020   • Weight gain 03/15/2021   • Nodule of upper lobe of left lung 04/22/2021   • Metastatic adenocarcinoma (HCC) 05/14/2021     Past Medical History:   Diagnosis Date   • Clot    • Dyspnea on exertion    • Elevated cholesterol    • Frequent urination    • GERD (gastroesophageal reflux disease)    • High blood pressure    • Hyperlipidemia    • Hypothyroidism    • Lung cancer (HCC)    • Lung nodule    • Migraine    • PONV (postoperative nausea and vomiting)          PAST SURGICAL HISTORY  Past Surgical History:   Procedure Laterality Date   • BREAST BIOPSY Bilateral 04/07/2021    Right Breast 10 o'clock & Left breast 10 o'clock 6 cm from nipple, [facility]   • CLOSED REDUCTION HIP DISLOCATION Left 4/30/2021    Procedure: BRONCHOSCOPY, LEFT VIDEO ASSITED THORACOSCOPY, ROBOTIC ASSSITED MEDIASTINAL  LYMPHADENECTOMY WITH INTERCOSTAL NERVE BLOCKS;  Surgeon: Raphael Kerr III, MD;  Location: [facility] MAIN OR;  Service: DaVinci;  Laterality: Left;   • COLONOSCOPY     • TUBAL ABDOMINAL LIGATION     • VENOUS ACCESS DEVICE (PORT) INSERTION Right 2021    Procedure: INSERTION VENOUS ACCESS DEVICE;  Surgeon: Raphael Kerr III, MD;  Location: [facility] MAIN OR;  Service: Thoracic;  Laterality: Right;   • VENOUS ACCESS DEVICE (PORT) INSERTION Right 2021    Procedure: REVISION AND REPLACEMENT RIGHT SUBCLAVIAN VENOUS ACCESS PORT;  Surgeon: Raphael Kerr III, MD;  Location: [facility] MAIN OR;  Service: Thoracic;  Laterality: Right;   • WRIST SURGERY Right          FAMILY HISTORY  Family History   Problem Relation Age of Onset   • Cancer Father         LYMPHATIC   • Prostate cancer Father    • Lymphoma Father    • Alcohol abuse Brother    • Colon cancer Maternal Grandmother    • Malig Hyperthermia Neg Hx          SOCIAL HISTORY  Social History     Socioeconomic History   • Marital status:      Spouse name: Jelani   Tobacco Use   • Smoking status: Former Smoker     Packs/day: 1.00     Years: 30.00     Pack years: 30.00     Types: Electronic Cigarette, Cigarettes     Start date: 1981     Quit date:      Years since quittin.5   • Smokeless tobacco: Never Used   • Tobacco comment: ABT 15 CIGARETTES DAILY   Vaping Use   • Vaping Use: Some days   • Substances: Nicotine, Flavoring   • Devices: Disposable   Substance and Sexual Activity   • Alcohol use: Never   • Drug use: Never   • Sexual activity: Yes     Partners: Male         ALLERGIES  Patient has no known allergies.        REVIEW OF SYSTEMS  All systems reviewed and negative except for those discussed in HPI.       PHYSICAL EXAM    I have reviewed the triage vital signs and nursing notes.    ED Triage Vitals [22 0652]   Temp Heart Rate Resp BP SpO2   99.2 °F (37.3 °C) 105 20 134/88 99 %      Temp src Heart Rate Source Patient Position BP  Location FiO2 (%)   Oral Monitor Sitting Right arm --       Physical Exam  GENERAL: Alert, oriented to self and place.  She is disoriented to time. nontoxic-appearing, not distressed  HENT: head atraumatic, no nuchal rigidity  EYES: no scleral icterus, EOMI  CV: regular rhythm, regular rate, no murmur  RESPIRATORY: normal effort, CTA  ABDOMEN: soft, nontender  MUSCULOSKELETAL: no deformity, FROM, no calf swelling or tenderness  NEURO: alert, moves all extremities, follows commands.  Normal cerebellar function.  Patient unable to walk without aid.  SKIN: warm, dry        LAB RESULTS  Recent Results (from the past 24 hour(s))   Comprehensive Metabolic Panel    Collection Time: 08/03/22  8:06 AM    Specimen: Blood   Result Value Ref Range    Glucose 101 (H) 65 - 99 mg/dL    BUN 11 6 - 20 mg/dL    Creatinine 1.12 (H) 0.57 - 1.00 mg/dL    Sodium 133 (L) 136 - 145 mmol/L    Potassium 4.5 3.5 - 5.2 mmol/L    Chloride 104 98 - 107 mmol/L    CO2 19.0 (L) 22.0 - 29.0 mmol/L    Calcium 7.5 (L) 8.6 - 10.5 mg/dL    Total Protein 7.0 6.0 - 8.5 g/dL    Albumin 3.70 3.50 - 5.20 g/dL    ALT (SGPT) 15 1 - 33 U/L    AST (SGOT) 16 1 - 32 U/L    Alkaline Phosphatase 119 (H) 39 - 117 U/L    Total Bilirubin 0.4 0.0 - 1.2 mg/dL    Globulin 3.3 gm/dL    A/G Ratio 1.1 g/dL    BUN/Creatinine Ratio 9.8 7.0 - 25.0    Anion Gap 10.0 5.0 - 15.0 mmol/L    eGFR 56.8 (L) >60.0 mL/min/1.73   Lactic Acid, Plasma    Collection Time: 08/03/22  8:06 AM    Specimen: Blood   Result Value Ref Range    Lactate 0.9 0.5 - 2.0 mmol/L   Procalcitonin    Collection Time: 08/03/22  8:06 AM    Specimen: Blood   Result Value Ref Range    Procalcitonin 0.22 0.00 - 0.25 ng/mL   CBC Auto Differential    Collection Time: 08/03/22  8:06 AM    Specimen: Blood   Result Value Ref Range    WBC 0.68 (C) 3.40 - 10.80 10*3/mm3    RBC 3.25 (L) 3.77 - 5.28 10*6/mm3    Hemoglobin 9.6 (L) 12.0 - 15.9 g/dL    Hematocrit 30.7 (L) 34.0 - 46.6 %    MCV 94.5 79.0 - 97.0 fL    MCH 29.5  26.6 - 33.0 pg    MCHC 31.3 (L) 31.5 - 35.7 g/dL    RDW 12.0 (L) 12.3 - 15.4 %    RDW-SD 41.6 37.0 - 54.0 fl    MPV 9.9 6.0 - 12.0 fL    Platelets 169 140 - 450 10*3/mm3   Manual Differential    Collection Time: 08/03/22  8:06 AM    Specimen: Blood   Result Value Ref Range    Neutrophil % 8.0 (L) 42.7 - 76.0 %    Lymphocyte % 33.0 19.6 - 45.3 %    Monocyte % 58.0 (H) 5.0 - 12.0 %    Eosinophil % 1.0 0.3 - 6.2 %    Neutrophils Absolute 0.05 (L) 1.70 - 7.00 10*3/mm3    Lymphocytes Absolute 0.22 (L) 0.70 - 3.10 10*3/mm3    Monocytes Absolute 0.39 0.10 - 0.90 10*3/mm3    Eosinophils Absolute 0.01 0.00 - 0.40 10*3/mm3    RBC Morphology Normal Normal    WBC Morphology Normal Normal    Platelet Morphology Normal Normal   COVID-19,[facility] IN-HOUSE CEPHEID/YULIA NP SWAB IN TRANSPORT MEDIA 8-12 HR TAT - Swab, Nasopharynx    Collection Time: 08/03/22  8:14 AM    Specimen: Nasopharynx; Swab   Result Value Ref Range    COVID19 Not Detected Not Detected - Ref. Range   Urinalysis With Microscopic If Indicated (No Culture) - Urine, Clean Catch    Collection Time: 08/03/22  8:15 AM    Specimen: Urine, Clean Catch   Result Value Ref Range    Color, UA Yellow Yellow, Straw    Appearance, UA Cloudy (A) Clear    pH, UA 7.5 5.0 - 8.0    Specific Gravity, UA 1.016 1.005 - 1.030    Glucose, UA Negative Negative    Ketones, UA Negative Negative    Bilirubin, UA Negative Negative    Blood, UA Negative Negative    Protein,  mg/dL (2+) (A) Negative    Leuk Esterase, UA Negative Negative    Nitrite, UA Negative Negative    Urobilinogen, UA 0.2 E.U./dL 0.2 - 1.0 E.U./dL   Urinalysis, Microscopic Only - Urine, Clean Catch    Collection Time: 08/03/22  8:15 AM    Specimen: Urine, Clean Catch   Result Value Ref Range    RBC, UA 0-2 None Seen, 0-2 /HPF    WBC, UA 0-2 None Seen, 0-2 /HPF    Bacteria, UA None Seen None Seen /HPF    Squamous Epithelial Cells, UA 0-2 None Seen, 0-2 /HPF    Hyaline Casts, UA 0-2 None Seen /LPF    Methodology  Automated Microscopy        Ordered the above labs and independently reviewed the results.        RADIOLOGY  CT Head Without Contrast    Result Date: 8/3/2022  CT HEAD WITHOUT CONTRAST  HISTORY: Confused, unsteady gait, breast and lung cancer.  COMPARISON: MRI examination of brain 10/26/2021.  FINDINGS: The brain and ventricles are symmetrical. There is no evidence of intracranial hemorrhage, hydrocephalus or of abnormal extra-axial fluid. No focal area of decreased attenuation to suggest acute infarction or vasogenic edema is identified. There is no evidence of a focal lytic or blastic lesion to suggest calvarial metastatic disease. The PET performed on 07/18/2022 showed no evidence of metastatic disease involving the skull base. There is a new metastatic lesion involving the lamina of C2 on the right posteriorly and extending to the anterior aspect of the spinous process. It measured approximately 17 mm in maximum transverse dimension and is new versus the PET examination of 04/11/2022.      1.  No evidence of acute infarction or of intracranial hemorrhage. No obvious metastatic disease is identified intracranially on this noncontrasted CT examination of the brain. Further evaluation could be performed with a MRI examination of the brain with and without contrast. The PET examination 07/18/2022 demonstrated lytic lesion involving the lamina of C2 on the right posteriorly extending to the anterior aspect of the spinous process measuring 17 mm in size. This is new versus 04/11/2022. Further evaluation with a MRI examination of the cervical spine with and without contrast is recommended.  The above information was called to and discussed with Conor Kelly.    Radiation dose reduction techniques were utilized, including automated exposure control and exposure modulation based on body size.       XR Chest 1 View    Result Date: 8/3/2022  XR CHEST 1 VW-  Clinical: Acute mental status change  COMPARISON chest radiograph  11/29/2021  FINDINGS: There is cardiac enlargement. There are linear areas of scarring versus discoid atelectasis left mid lung zone. Along the pleural surface at this location there is a vague 18 mm opacity, this could represent a small focus of infiltrate or pleural thickening or peripheral nodule. No effusion or edema.  There is Mediport catheter in position. Mediastinum and lan are stable.  CONCLUSION: Fine linear areas of scarring versus discoid atelectasis left midlung zone, adjacent small area of infiltrate, pleural thickening or nodule. PA and lateral view of the chest recommended when patient's condition permits.  This report was finalized on 8/3/2022 8:43 AM by Dr. Justin Doherty M.D.        I ordered the above noted radiological studies. Reviewed by me and discussed with radiologist.  See dictation for official radiology interpretation.      MEDICATIONS GIVEN IN ER    Medications   sodium chloride 0.9 % flush 10 mL (has no administration in time range)   sodium chloride 0.9 % flush 10 mL (has no administration in time range)   sodium chloride 0.9 % flush 10 mL (10 mL Intravenous Given 8/3/22 0813)   sodium chloride 0.9 % flush 10 mL (has no administration in time range)   sodium chloride 0.9 % flush 20 mL (has no administration in time range)   heparin injection 500 Units (has no administration in time range)   acetaminophen (TYLENOL) tablet 650 mg (has no administration in time range)   ondansetron (ZOFRAN) tablet 4 mg (has no administration in time range)     Or   ondansetron (ZOFRAN) injection 4 mg (has no administration in time range)   sodium chloride 0.9 % infusion (has no administration in time range)   aspirin tablet 325 mg (325 mg Oral Given 8/3/22 1540)     Or   aspirin suppository 300 mg ( Rectal Not Given:  See Alt 8/3/22 1540)   atorvastatin (LIPITOR) tablet 80 mg (has no administration in time range)   diazePAM (VALIUM) injection 5 mg (has no administration in time range)   letrozole  (FEMARA) tablet 2.5 mg (2.5 mg Oral Given 8/3/22 1540)   folic acid (FOLVITE) tablet 1 mg (1 mg Oral Given 8/3/22 1541)   ALPRAZolam (XANAX) tablet 0.5 mg (has no administration in time range)   buPROPion XL (WELLBUTRIN XL) 24 hr tablet 150 mg (has no administration in time range)   levothyroxine (SYNTHROID, LEVOTHROID) tablet 25 mcg (25 mcg Oral Given 8/3/22 1541)   propranolol LA (INDERAL LA) 24 hr capsule 60 mg (60 mg Oral Given 8/3/22 1540)   FLUoxetine (PROzac) capsule 40 mg (40 mg Oral Given 8/3/22 1541)   oxyCODONE-acetaminophen (PERCOCET) 5-325 MG per tablet 1 tablet (has no administration in time range)   OLANZapine (zyPREXA) tablet 5 mg (has no administration in time range)   prochlorperazine (COMPAZINE) tablet 10 mg (has no administration in time range)   pantoprazole (PROTONIX) EC tablet 40 mg (has no administration in time range)   gabapentin (NEURONTIN) capsule 600 mg (600 mg Oral Given 8/3/22 1540)   Filgrastim (NEUPOGEN) injection 300 mcg (has no administration in time range)   cefepime 2 gm IVPB in 100 ml NS (VTB) (has no administration in time range)         PROGRESS, DATA ANALYSIS, CONSULTS, AND MEDICAL DECISION MAKING    All labs have been independently reviewed by me.  All radiology studies have been reviewed by me and discussed with radiologist dictating the report.   EKG's independently viewed and interpreted by me.  Discussion below represents my analysis of pertinent findings related to patient's condition, differential diagnosis, treatment plan and final disposition.    I have discussed case with Dr. Smith, emergency room physician.  He has performed his own bedside examination and agrees with treatment plan.    ED Course as of 08/03/22 1544   Wed Aug 03, 2022   0724 Patient presents with 2-day history of feeling off balance, mild confusion.  Patient has known metastatic breast and lung cancer with metastases to the bones.  No neurodeficits on exam.  Plan to check basic labs, CT head and  will reevaluate. [EE]   0758 I discussed CT head findings with Dr. Parham.  No obvious metastatic disease seen.    He did review prior PET scan from last month.  Patient does have a metastatic lesion at the posterior aspect of C2 of 17 mm.    He recommends an MRI of the brain and cervical spine with and without contrast for further evaluation. [EE]   0850 Lactate: 0.9 [EE]   0944 I discussed case with CHELLE Figueredo.  He agrees to admit.    No evidence of underlying infection.  We will hold on antibiotics at this time. [EE]   1137 WBC(!!): 0.68 [EE]   1137 Neutrophils Absolute(!): 0.05 [EE]      ED Course User Index  [EE] Conor Kelly PA       AS OF 15:44 EDT VITALS:    BP - 136/86  HR - 101  TEMP - (!) 101.9 °F (38.8 °C) (Oral)  O2 SATS - 98%        DIAGNOSIS  Final diagnoses:   Confusion   Weakness   Ataxia   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)         DISPOSITION  Admitted      Dictated utilizing Dragon dictation     Conor Kelly PA  08/03/22 [id number]

## 2022-08-03 NOTE — CONSULTS
Referring Provider: Toby Jade MD  Reason for Consultation:   Chief Complaint   Patient presents with   • Balance Issues         Subjective   History of present illness: Asked to see for neutropenic fever.  Per review the past medical records, this patient with bilateral breast cancer invasive ductal carcinoma both right and left breast in 2021 treated with letrozole.  She had also been diagnosed with metastatic adenocarcinoma of the left lung started on chemotherapy and radiation.  Had been managed on Alimta and Keytruda but had progressive disease on PET/CT was therefore switched to Taxotere with her first cycle on 7/20/2022.    She unfortunately reports a 2-day history of imbalance, associated with low-grade fever to 100.7 and worse when she tries to move around.  This was progressive and eventually prompted her evaluation in the emergency department today.  She denies any focal infectious symptoms such as cough, shortness of breath, URI symptoms, diarrhea or dysuria.  No rash.    Past Medical History:   Diagnosis Date   • Anxiety    • Clot     POSSIBLE BLOOD CLOT IN VENOUS ACCESS DEVICE-PT STATES WAS STARTED ON ELIQUIS    • Dyspnea on exertion    • Elevated cholesterol    • Frequent urination     DAY AND NIGHT   • SHAYY (generalized anxiety disorder)     RELATED HIGH BLOOD PRESSURE   • GERD (gastroesophageal reflux disease)    • High blood pressure     ANXIETY RELATED   • Hyperlipidemia    • Hypothyroidism    • Lung cancer (HCC)    • Lung nodule     LEFT   • Malignant neoplasm of upper-outer quadrant of right breast in female, estrogen receptor positive (HCC) 4/13/2021    PT STATES HAS BILAT BREAST CANCER   • Migraine    • PONV (postoperative nausea and vomiting)        Past Surgical History:   Procedure Laterality Date   • BREAST BIOPSY Bilateral 04/07/2021    Right Breast 10 o'clock & Left breast 10 o'clock 6 cm from nipple, [facility]   • CLOSED REDUCTION HIP DISLOCATION Left 4/30/2021    Procedure: BRONCHOSCOPY,  LEFT VIDEO ASSITED THORACOSCOPY, ROBOTIC ASSSITED MEDIASTINAL LYMPHADENECTOMY WITH INTERCOSTAL NERVE BLOCKS;  Surgeon: Raphael Kerr III, MD;  Location: [facility] MAIN OR;  Service: DaVinci;  Laterality: Left;   • COLONOSCOPY     • TUBAL ABDOMINAL LIGATION     • VENOUS ACCESS DEVICE (PORT) INSERTION Right 5/20/2021    Procedure: INSERTION VENOUS ACCESS DEVICE;  Surgeon: Raphael Kerr III, MD;  Location: [facility] MAIN OR;  Service: Thoracic;  Laterality: Right;   • VENOUS ACCESS DEVICE (PORT) INSERTION Right 11/29/2021    Procedure: REVISION AND REPLACEMENT RIGHT SUBCLAVIAN VENOUS ACCESS PORT;  Surgeon: Raphael Kerr III, MD;  Location: [facility] MAIN OR;  Service: Thoracic;  Laterality: Right;   • WRIST SURGERY Right        No family history infectious diseases  Social history: She is  and lives with her mother here in [place], [place].  Ex-smoker.  Known drug allergies    Review of Systems  Pertinent items are noted in HPI, all other systems reviewed and negative    Objective     Physical Exam:   Vital Signs   Temp:  [99.2 °F (37.3 °C)-100.7 °F (38.2 °C)] 100.7 °F (38.2 °C)  Heart Rate:  [] 96  Resp:  [18-20] 18  BP: (116-134)/(65-99) 128/83    GENERAL: Awake and alert, in no acute distress.   HEENT: Oropharynx is clear. Hearing is grossly normal.   EYES: PERRL. No conjunctival injection. No lid lag.   LYMPHATICS: No lymphadenopathy of the neck or inguinal regions.   HEART: Regular rate and regular rhythm. No peripheral edema.   LUNGS: Clear to auscultation anteriorly with normal respiratory effort.   GI: Soft, nontender, nondistended. No appreciable organomegaly.   SKIN: Warm and dry without cutaneous eruptions   PSYCHIATRIC: Appropriate mood, affect, insight, and judgment.     Results Review:  WBC 0.68, hgb 9.6, plt 169  Cr 1.12    LFTs nl  PCT 0.22  UA negative    Microbiology:  COVID 19 negative    Radiology:  Chest x-ray independently interpreted with the left midlung lesion,  concerning for atelectasis.  Dilatation    A/p  Neutropenic sepsis  Ataxia  Metastatic lung adenocarcinoma    I will go ahead and place her on cefepime 2 g IV every 12 hours and check blood cultures.  She is being worked up for stroke or progressive metastatic CNS disease which could both cause low-grade temperatures.  Her initial procalcitonin was negative which is reassuring.    Thank you for this consult.  We will continue to follow along and tailor antibiotics as the patient's clinical course evolves.      Nikolai Hunt MD  08/03/22  14:08 EDT

## 2022-08-03 NOTE — H&P
Patient Name:  Holli Patel  YOB: 1965  MRN:  [id number]  Admit Date:  8/3/2022  Patient Care Team:  Vandana Gastelum MD as PCP - General (Family Medicine)  Vandana Gastelum MD as Referring Physician (Family Medicine)  Salma Snell MD as Consulting Physician (Hematology and Oncology)  Patty Franklin MD as Consulting Physician (Radiation Oncology)      Subjective   History Present Illness     Chief Complaint   Patient presents with   • Balance Issues       Ms. Patel is a 57 y.o. female with a history of breast and lung cancer that presents to [facility] complaining of confusion last night and ataxia this morning.  Her breast cancer has been stable but she is receiving radiation treatments and chemotherapy for her lung cancer.  She sees Dr. Snell with the [facility] group and actually saw her yesterday at which time she was doing quite well.  She was at her baseline mental state was able to walk in and out of her office without difficulty.  Family noticed some confusion that started last night.  This morning her  states she was confused as to what time of the day it was.  She was seeing other things that he did not think it made sense.  He noticed that she was having significant difficulty walking.  Patient complained of feeling she is off balance.  No dizziness.  No headache, nausea or vomiting.  No fever or chills.  Family decided to seek medical attention because of her symptoms.  Despite seeing the doctor yesterday they deny taking any new medications since yesterday.  No change in her narcotics and no over-the-counter medications.  At the time of my exam she denies any specific complaints.  Her speech is intact.  She seems to have no focal deficits.  They try to walk her in the emergency department but she could not safely ambulate.      History of Present Illness  Review of Systems   HENT: Negative.    Respiratory: Negative.    Cardiovascular: Negative.     Gastrointestinal: Negative.    Endocrine: Negative.    Genitourinary: Negative.    Musculoskeletal: Positive for gait problem.   Skin: Negative.    Neurological: Positive for dizziness and weakness.   Hematological: Negative.    Psychiatric/Behavioral: Negative.         Personal History     Past Medical History:   Diagnosis Date   • Anxiety    • Clot     POSSIBLE BLOOD CLOT IN VENOUS ACCESS DEVICE-PT STATES WAS STARTED ON ELIQUIS    • Dyspnea on exertion    • Elevated cholesterol    • Frequent urination     DAY AND NIGHT   • SHAYY (generalized anxiety disorder)     RELATED HIGH BLOOD PRESSURE   • GERD (gastroesophageal reflux disease)    • High blood pressure     ANXIETY RELATED   • Hyperlipidemia    • Hypothyroidism    • Lung cancer (HCC)    • Lung nodule     LEFT   • Malignant neoplasm of upper-outer quadrant of right breast in female, estrogen receptor positive (HCC) 4/13/2021    PT STATES HAS BILAT BREAST CANCER   • Migraine    • PONV (postoperative nausea and vomiting)      Past Surgical History:   Procedure Laterality Date   • BREAST BIOPSY Bilateral 04/07/2021    Right Breast 10 o'clock & Left breast 10 o'clock 6 cm from nipple, [facility]   • CLOSED REDUCTION HIP DISLOCATION Left 4/30/2021    Procedure: BRONCHOSCOPY, LEFT VIDEO ASSITED THORACOSCOPY, ROBOTIC ASSSITED MEDIASTINAL LYMPHADENECTOMY WITH INTERCOSTAL NERVE BLOCKS;  Surgeon: Raphael Kerr III, MD;  Location: [facility];  Service: [facility];  Laterality: Left;   • COLONOSCOPY     • TUBAL ABDOMINAL LIGATION     • VENOUS ACCESS DEVICE (PORT) INSERTION Right 5/20/2021    Procedure: INSERTION VENOUS ACCESS DEVICE;  Surgeon: Raphael Kerr III, MD;  Location: [facility] OR;  Service: Thoracic;  Laterality: Right;   • VENOUS ACCESS DEVICE (PORT) INSERTION Right 11/29/2021    Procedure: REVISION AND REPLACEMENT RIGHT SUBCLAVIAN VENOUS ACCESS PORT;  Surgeon: Raphael Kerr III, MD;  Location: [facility] OR;  Service: Thoracic;  Laterality: Right;   •  WRIST SURGERY Right      Family History   Problem Relation Age of Onset   • Cancer Father         LYMPHATIC   • Prostate cancer Father    • Lymphoma Father    • Alcohol abuse Brother    • Colon cancer Maternal Grandmother    • Malig Hyperthermia Neg Hx      Social History     Tobacco Use   • Smoking status: Former Smoker     Packs/day: 1.00     Years: 30.00     Pack years: 30.00     Types: Electronic Cigarette, Cigarettes     Start date: 1981     Quit date:      Years since quittin.5   • Smokeless tobacco: Never Used   • Tobacco comment: ABT 15 CIGARETTES DAILY   Vaping Use   • Vaping Use: Some days   • Substances: Nicotine, Flavoring   • Devices: Disposable   Substance Use Topics   • Alcohol use: Never   • Drug use: Never     No current facility-administered medications on file prior to encounter.     Current Outpatient Medications on File Prior to Encounter   Medication Sig Dispense Refill   • ALPRAZolam (XANAX) 0.5 MG tablet Take 1 tablet by mouth 2 (Two) Times a Day As Needed for Anxiety. 60 tablet 2   • buPROPion XL (WELLBUTRIN XL) 300 MG 24 hr tablet Take 1 tablet by mouth Daily. 30 tablet 5   • FLUoxetine (PROzac) 40 MG capsule TAKE ONE CAPSULE BY MOUTH DAILY 90 capsule 1   • folic acid (FOLVITE) 1 MG tablet Take 1 tablet by mouth Daily. 30 tablet 3   • gabapentin (NEURONTIN) 300 MG capsule TAKE TWO CAPSULES BY MOUTH THREE TIMES A  capsule 3   • letrozole (FEMARA) 2.5 MG tablet Take 1 tablet by mouth Daily. 90 tablet 3   • levothyroxine (SYNTHROID, LEVOTHROID) 25 MCG tablet Take 1 tablet by mouth Daily. 90 tablet 1   • OLANZapine (zyPREXA) 5 MG tablet TAKE ONE TABLET BY MOUTH ONCE NIGHTLY 30 tablet 1   • omeprazole (PrilOSEC) 20 MG capsule Take 1 capsule by mouth 2 (Two) Times a Day. 180 capsule 1   • oxyCODONE-acetaminophen (Percocet) 5-325 MG per tablet Take 1 tablet by mouth Every 6 (Six) Hours As Needed for Moderate Pain . (Patient taking differently: Take 2 tablets by mouth Every 6  (Six) Hours As Needed for Moderate Pain .) 120 tablet 0   • propranolol LA (Inderal LA) 60 MG 24 hr capsule Take 1 capsule by mouth Daily. 90 capsule 1   • simvastatin (ZOCOR) 20 MG tablet Take 1 tablet by mouth Daily. 90 tablet 1   • dexamethasone (DECADRON) 4 MG tablet Take 2 tablets oral twice a day for 3 consecutive days beginning the day before chemotherapy and continue for 6 doses. 12 tablet 5   • Morphine (MS CONTIN) 15 MG 12 hr tablet Take 1 tablet by mouth 2 (Two) Times a Day for 15 days. 1 tablet 60 tablet 0   • prochlorperazine (COMPAZINE) 10 MG tablet Take 1 tablet by mouth Every 6 (Six) Hours As Needed for Nausea or Vomiting. 30 tablet 1   • rizatriptan (MAXALT) 10 MG tablet Take 1 tablet by mouth 1 (One) Time for 1 dose. AT ONSET OF HEADACHE MAY REPEAT ONE TABLET IN 2 HOURS IF NEEDED 12 tablet 5     No Known Allergies    Objective    Objective     Vital Signs  Temp:  [99.2 °F (37.3 °C)-100.7 °F (38.2 °C)] 100.7 °F (38.2 °C)  Heart Rate:  [] 96  Resp:  [18-20] 18  BP: (116-134)/(65-99) 128/83  SpO2:  [94 %-99 %] 97 %  on   ;   Device (Oxygen Therapy): room air  Body mass index is 30.03 kg/m².    Physical Exam  Vitals and nursing note reviewed.   Constitutional:       General: She is not in acute distress.     Appearance: Normal appearance.   HENT:      Head: Normocephalic and atraumatic.      Mouth/Throat:      Mouth: Mucous membranes are dry.   Eyes:      General: No scleral icterus.     Conjunctiva/sclera: Conjunctivae normal.   Neck:      Vascular: No JVD.   Cardiovascular:      Rate and Rhythm: Normal rate and regular rhythm.      Heart sounds: Normal heart sounds.   Pulmonary:      Effort: Pulmonary effort is normal.      Breath sounds: Normal breath sounds.   Abdominal:      General: Bowel sounds are normal. There is no distension.      Palpations: Abdomen is soft.      Tenderness: There is no abdominal tenderness.   Musculoskeletal:         General: Normal range of motion.      Cervical  back: Normal range of motion.      Comments: Decreased ROM right hip due to metastatic disease   Skin:     General: Skin is warm and dry.   Neurological:      General: No focal deficit present.      Mental Status: She is alert and oriented to person, place, and time.   Psychiatric:         Behavior: Behavior normal.         Results Review:  I reviewed the patient's new clinical results.  I reviewed the patient's new imaging results and agree with the interpretation.  I reviewed the patient's other test results and agree with the interpretation  I personally viewed and interpreted the patient's EKG/Telemetry data  Discussed with ED provider.    Lab Results (last 24 hours)     Procedure Component Value Units Date/Time    CBC & Differential [[id number]]  (Abnormal) Collected: 08/03/22 0806    Specimen: Blood Updated: 08/03/22 0903    Narrative:      The following orders were created for panel order CBC & Differential.  Procedure                               Abnormality         Status                     ---------                               -----------         ------                     CBC Auto Differential[[id number]]        Abnormal            Final result                 Please view results for these tests on the individual orders.    Comprehensive Metabolic Panel [[id number]]  (Abnormal) Collected: 08/03/22 0806    Specimen: Blood Updated: 08/03/22 0852     Glucose 101 mg/dL      BUN 11 mg/dL      Creatinine 1.12 mg/dL      Sodium 133 mmol/L      Potassium 4.5 mmol/L      Chloride 104 mmol/L      CO2 19.0 mmol/L      Calcium 7.5 mg/dL      Total Protein 7.0 g/dL      Albumin 3.70 g/dL      ALT (SGPT) 15 U/L      AST (SGOT) 16 U/L      Alkaline Phosphatase 119 U/L      Total Bilirubin 0.4 mg/dL      Globulin 3.3 gm/dL      A/G Ratio 1.1 g/dL      BUN/Creatinine Ratio 9.8     Anion Gap 10.0 mmol/L      eGFR 56.8 mL/min/1.73      Comment: National Kidney Foundation and American Society of Nephrology (ASN) Task Force  "recommended calculation based on the Chronic Kidney Disease Epidemiology Collaboration (CKD-EPI) equation refit without adjustment for race.       Narrative:      GFR Normal >60  Chronic Kidney Disease <60  Kidney Failure <15      Lactic Acid, Plasma [[id number]]  (Normal) Collected: 08/03/22 0806    Specimen: Blood Updated: 08/03/22 0848     Lactate 0.9 mmol/L     Procalcitonin [[id number]]  (Normal) Collected: 08/03/22 0806    Specimen: Blood Updated: 08/03/22 0859     Procalcitonin 0.22 ng/mL     Narrative:      As a Marker for Sepsis (Non-Neonates):    1. <0.5 ng/mL represents a low risk of severe sepsis and/or septic shock.  2. >2 ng/mL represents a high risk of severe sepsis and/or septic shock.    As a Marker for Lower Respiratory Tract Infections that require antibiotic therapy:    PCT on Admission    Antibiotic Therapy       6-12 Hrs later    >0.5                Strongly Recommended  >0.25 - <0.5        Recommended   0.1 - 0.25          Discouraged              Remeasure/reassess PCT  <0.1                Strongly Discouraged     Remeasure/reassess PCT    As 28 day mortality risk marker: \"Change in Procalcitonin Result\" (>80% or <=80%) if Day 0 (or Day 1) and Day 4 values are available. Refer to http://www.[employer]!s-pct-calculator.com    Change in PCT <=80%  A decrease of PCT levels below or equal to 80% defines a positive change in PCT test result representing a higher risk for 28-day all-cause mortality of patients diagnosed with severe sepsis for septic shock.    Change in PCT >80%  A decrease of PCT levels of more than 80% defines a negative change in PCT result representing a lower risk for 28-day all-cause mortality of patients diagnosed with severe sepsis or septic shock.       CBC Auto Differential [[id number]]  (Abnormal) Collected: 08/03/22 0806    Specimen: Blood Updated: 08/03/22 0903     WBC 0.68 10*3/mm3      RBC 3.25 10*6/mm3      Hemoglobin 9.6 g/dL      Hematocrit 30.7 %      MCV 94.5 fL      MCH " 29.5 pg      MCHC 31.3 g/dL      RDW 12.0 %      RDW-SD 41.6 fl      MPV 9.9 fL      Platelets 169 10*3/mm3     Manual Differential [[id number]]  (Abnormal) Collected: 08/03/22 0806    Specimen: Blood Updated: 08/03/22 1012     Neutrophil % 8.0 %      Lymphocyte % 33.0 %      Monocyte % 58.0 %      Eosinophil % 1.0 %      Neutrophils Absolute 0.05 10*3/mm3      Lymphocytes Absolute 0.22 10*3/mm3      Monocytes Absolute 0.39 10*3/mm3      Eosinophils Absolute 0.01 10*3/mm3      RBC Morphology Normal     WBC Morphology Normal     Platelet Morphology Normal    COVID PRE-OP / PRE-PROCEDURE SCREENING ORDER (NO ISOLATION) - Swab, Nasopharynx [[id number]]  (Normal) Collected: 08/03/22 0814    Specimen: Swab from Nasopharynx Updated: 08/03/22 0907    Narrative:      The following orders were created for panel order COVID PRE-OP / PRE-PROCEDURE SCREENING ORDER (NO ISOLATION) - Swab, Nasopharynx.  Procedure                               Abnormality         Status                     ---------                               -----------         ------                     COVID-19,[facility]-HOUSE...[[id number]]  Normal              Final result                 Please view results for these tests on the individual orders.    COVID-19,[facility]-HOU[facility] CEPHEID/YULIA NP SWAB IN TRANSPORT MEDIA 8-12 HR TAT - Swab, Nasopharynx [[id number]]  (Normal) Collected: 08/03/22 0814    Specimen: Swab from Nasopharynx Updated: 08/03/22 0907     COVID19 Not Detected    Narrative:      Fact sheet for providers: https://www.fda.gov/media/432892/download     Fact sheet for patients: https://www.fda.gov/media/840585/download    Urinalysis With Microscopic If Indicated (No Culture) - Urine, Clean Catch [[id number]]  (Abnormal) Collected: 08/03/22 0815    Specimen: Urine, Clean Catch Updated: 08/03/22 0841     Color, UA Yellow     Appearance, UA Cloudy     pH, UA 7.5     Specific Gravity, UA 1.016     Glucose, UA Negative     Ketones, UA Negative      Bilirubin, UA Negative     Blood, UA Negative     Protein,  mg/dL (2+)     Leuk Esterase, UA Negative     Nitrite, UA Negative     Urobilinogen, UA 0.2 E.U./dL    Urinalysis, Microscopic Only - Urine, Clean Catch [[id number]] Collected: 08/03/22 0815    Specimen: Urine, Clean Catch Updated: 08/03/22 0841     RBC, UA 0-2 /HPF      WBC, UA 0-2 /HPF      Bacteria, UA None Seen /HPF      Squamous Epithelial Cells, UA 0-2 /HPF      Hyaline Casts, UA 0-2 /LPF      Methodology Automated Microscopy          Imaging Results (Last 24 Hours)     Procedure Component Value Units Date/Time    XR Chest 1 View [[id number]] Collected: 08/03/22 0839     Updated: 08/03/22 0846    Narrative:      XR CHEST 1 VW-     Clinical: Acute mental status change     COMPARISON chest radiograph 11/29/2021     FINDINGS: There is cardiac enlargement. There are linear areas of  scarring versus discoid atelectasis left mid lung zone. Along the  pleural surface at this location there is a vague 18 mm opacity, this  could represent a small focus of infiltrate or pleural thickening or  peripheral nodule. No effusion or edema.     There is Mediport catheter in position. Mediastinum and lan are stable.     CONCLUSION: Fine linear areas of scarring versus discoid atelectasis  left midlung zone, adjacent small area of infiltrate, pleural thickening  or nodule. PA and lateral view of the chest recommended when patient's  condition permits.     This report was finalized on 8/3/2022 8:43 AM by Dr. Justin Doherty M.D.       CT Head Without Contrast [[id number]] Collected: 08/03/22 0803     Updated: 08/03/22 0803    Narrative:      CT HEAD WITHOUT CONTRAST     HISTORY: Confused, unsteady gait, breast and lung cancer.     COMPARISON: MRI examination of brain 10/26/2021.     FINDINGS: The brain and ventricles are symmetrical. There is no evidence  of intracranial hemorrhage, hydrocephalus or of abnormal extra-axial  fluid. No focal area of decreased attenuation  to suggest acute  infarction or vasogenic edema is identified. There is no evidence of a  focal lytic or blastic lesion to suggest calvarial metastatic disease.  The PET performed on 07/18/2022 showed no evidence of metastatic disease  involving the skull base. There is a new metastatic lesion involving the  lamina of C2 on the right posteriorly and extending to the anterior  aspect of the spinous process. It measured approximately 17 mm in  maximum transverse dimension and is new versus the PET examination of  04/11/2022.       Impression:      1.  No evidence of acute infarction or of intracranial hemorrhage. No  obvious metastatic disease is identified intracranially on this  noncontrasted CT examination of the brain. Further evaluation could be  performed with a MRI examination of the brain with and without contrast.  The PET examination 07/18/2022 demonstrated lytic lesion involving the  lamina of C2 on the right posteriorly extending to the anterior aspect  of the spinous process measuring 17 mm in size. This is new versus  04/11/2022. Further evaluation with a MRI examination of the cervical  spine with and without contrast is recommended.     The above information was called to and discussed with Conor Kelly.           Radiation dose reduction techniques were utilized, including automated  exposure control and exposure modulation based on body size.                  No orders to display     Assessment/Plan   Assessment & Plan   Active Hospital Problems    Diagnosis  POA   • **Ataxia [R27.0]  Yes   • Confusion [R41.0]  Yes   • Chemotherapy-induced neutropenia (HCC) [D70.1, T45.1X5A]  Yes   • Metabolic acidosis [E87.2]  Yes   • Bony metastasis (HCC) [C79.51]  Yes   • Stage 3a chronic kidney disease (HCC) [N18.31]  Yes   • History of bilateral breast cancer [Z85.3]  Not Applicable   • Primary adenocarcinoma of upper lobe of left lung (HCC) [C34.12]  Yes   • Essential hypertension [I10]  Yes      Resolved  Hospital Problems   No resolved problems to display.       57 y.o. female admitted with Ataxia.    She had an MRI study 10/2021 that showed ischemic stroke right posterior frontal region.  No evidence of intracranial metastatic disease at that time.  Her exam seems nonfocal at present but per the emergency department she had severe ataxia and certainly stroke needs to be further ruled out.  She also has extensive bony metastasis from her lung cancer with involvement of her spine and right sacrum.  Will try and get MRI cervical spine as well.  Patient states she has very severe claustrophobia and may not be able to handle much MR imaging.  Will give dose of IV Valium prior to procedure.  Will ask neurology to assist with her evaluation as well as her oncologist.    Her renal function seems near baseline.  She is mildly acidotic.  Will give some IV fluids and monitor her chemistry.    Neutropenia is due to her chemotherapy.  Oncology was considering Neupogen.  Will obviously defer to them.      · SCDs for DVT prophylaxis.  · Full code.  · Discussed with patient, family, nursing staff and ED provider.      Toby Jade MD  [facility]  08/03/22  13:24 EDT      ADDENDUM  Since patient seen by me she has developed low-grade fever 100.7.  She has mild tachycardia but no real other signs of sepsis.  Given her severe neutropenia will ask ID to see her.  Her only somewhat infectious complaint could be her diarrhea.  Will send for stool studies and blood cultures.      Electronically signed by Toby Jade MD, 08/03/22, 1:51 PM EDT.

## 2022-08-03 NOTE — CONSULTS
NEUROLOGY CONSULT - STROKE TEAM    DOS: 8/3/2022  NAME: Holli Patel   : 1965  PCP: Vandana Gastelum MD  CC: Stroke  Referring MD: Toby Jade MD  Patient being seen at request of Dr. Jade for advice and opinion on ataxia and confusion.    Neurological Problem and Interval History:  57 y.o. female presents with chief complaint of: off balance.  Patient has had several days of not feeling well, not eating well, difficulty with short term memory, coming up with the right words and then today had difficulty where she couldn't walk. Just yesterday she went to a number of appointments dinner and home without difficulty walking. The difficulties with her confusion are not associated with hallucinations, loss of consciousness and are brief and momentary.    Patient has had a number of pain medicines for her bone metastases including oxycodone. Was supposed to get a light morphine medicine but has not filled it yet.       Past Medical/Surgical Hx:  Past Medical History:   Diagnosis Date   • Anxiety    • Clot     POSSIBLE BLOOD CLOT IN VENOUS ACCESS DEVICE-PT STATES WAS STARTED ON ELIQUIS    • Dyspnea on exertion    • Elevated cholesterol    • Frequent urination     DAY AND NIGHT   • SHAYY (generalized anxiety disorder)     RELATED HIGH BLOOD PRESSURE   • GERD (gastroesophageal reflux disease)    • High blood pressure     ANXIETY RELATED   • Hyperlipidemia    • Hypothyroidism    • Lung cancer (HCC)    • Lung nodule     LEFT   • Malignant neoplasm of upper-outer quadrant of right breast in female, estrogen receptor positive (HCC) 2021    PT STATES HAS BILAT BREAST CANCER   • Migraine    • PONV (postoperative nausea and vomiting)      Past Surgical History:   Procedure Laterality Date   • BREAST BIOPSY Bilateral 2021    Right Breast 10 o'clock & Left breast 10 o'clock 6 cm from nipple, [facility]   • CLOSED REDUCTION HIP DISLOCATION Left 2021    Procedure: BRONCHOSCOPY, LEFT VIDEO ASSITED THORACOSCOPY,  ROBOTIC ASSSITED MEDIASTINAL LYMPHADENECTOMY WITH INTERCOSTAL NERVE BLOCKS;  Surgeon: Raphael Kerr III, MD;  Location: [facility] MAIN OR;  Service: DaVinci;  Laterality: Left;   • COLONOSCOPY     • TUBAL ABDOMINAL LIGATION     • VENOUS ACCESS DEVICE (PORT) INSERTION Right 5/20/2021    Procedure: INSERTION VENOUS ACCESS DEVICE;  Surgeon: Raphael Kerr III, MD;  Location: [facility] MAIN OR;  Service: Thoracic;  Laterality: Right;   • VENOUS ACCESS DEVICE (PORT) INSERTION Right 11/29/2021    Procedure: REVISION AND REPLACEMENT RIGHT SUBCLAVIAN VENOUS ACCESS PORT;  Surgeon: Raphael Kerr III, MD;  Location: [facility] MAIN OR;  Service: Thoracic;  Laterality: Right;   • WRIST SURGERY Right        Review of Systems:  See H&P    Medications On Admission  Medications Prior to Admission   Medication Sig Dispense Refill Last Dose   • ALPRAZolam (XANAX) 0.5 MG tablet Take 1 tablet by mouth 2 (Two) Times a Day As Needed for Anxiety. 60 tablet 2 8/2/2022 at Unknown time   • buPROPion XL (WELLBUTRIN XL) 300 MG 24 hr tablet Take 1 tablet by mouth Daily. 30 tablet 5 8/2/2022 at Unknown time   • FLUoxetine (PROzac) 40 MG capsule TAKE ONE CAPSULE BY MOUTH DAILY 90 capsule 1 8/2/2022 at Unknown time   • folic acid (FOLVITE) 1 MG tablet Take 1 tablet by mouth Daily. 30 tablet 3 8/2/2022 at Unknown time   • gabapentin (NEURONTIN) 300 MG capsule TAKE TWO CAPSULES BY MOUTH THREE TIMES A  capsule 3 8/2/2022 at Unknown time   • letrozole (FEMARA) 2.5 MG tablet Take 1 tablet by mouth Daily. 90 tablet 3 8/2/2022 at Unknown time   • levothyroxine (SYNTHROID, LEVOTHROID) 25 MCG tablet Take 1 tablet by mouth Daily. 90 tablet 1 8/2/2022 at Unknown time   • OLANZapine (zyPREXA) 5 MG tablet TAKE ONE TABLET BY MOUTH ONCE NIGHTLY 30 tablet 1 8/2/2022 at Unknown time   • omeprazole (PrilOSEC) 20 MG capsule Take 1 capsule by mouth 2 (Two) Times a Day. 180 capsule 1 8/2/2022 at Unknown time   • oxyCODONE-acetaminophen (Percocet) 5-325 MG per  tablet Take 1 tablet by mouth Every 6 (Six) Hours As Needed for Moderate Pain . (Patient taking differently: Take 2 tablets by mouth Every 6 (Six) Hours As Needed for Moderate Pain .) 120 tablet 0 2022 at Unknown time   • propranolol LA (Inderal LA) 60 MG 24 hr capsule Take 1 capsule by mouth Daily. 90 capsule 1 2022 at Unknown time   • simvastatin (ZOCOR) 20 MG tablet Take 1 tablet by mouth Daily. 90 tablet 1 2022 at Unknown time   • dexamethasone (DECADRON) 4 MG tablet Take 2 tablets oral twice a day for 3 consecutive days beginning the day before chemotherapy and continue for 6 doses. 12 tablet 5    • Morphine (MS CONTIN) 15 MG 12 hr tablet Take 1 tablet by mouth 2 (Two) Times a Day for 15 days. 1 tablet 60 tablet 0    • prochlorperazine (COMPAZINE) 10 MG tablet Take 1 tablet by mouth Every 6 (Six) Hours As Needed for Nausea or Vomiting. 30 tablet 1    • rizatriptan (MAXALT) 10 MG tablet Take 1 tablet by mouth 1 (One) Time for 1 dose. AT ONSET OF HEADACHE MAY REPEAT ONE TABLET IN 2 HOURS IF NEEDED 12 tablet 5        Allergies:  No Known Allergies    Social Hx:  Social History     Socioeconomic History   • Marital status:      Spouse name: Jelani   Tobacco Use   • Smoking status: Former Smoker     Packs/day: 1.00     Years: 30.00     Pack years: 30.00     Types: Electronic Cigarette, Cigarettes     Start date: 1981     Quit date:      Years since quittin.5   • Smokeless tobacco: Never Used   • Tobacco comment: ABT 15 CIGARETTES DAILY   Vaping Use   • Vaping Use: Some days   • Substances: Nicotine, Flavoring   • Devices: Disposable   Substance and Sexual Activity   • Alcohol use: Never   • Drug use: Never   • Sexual activity: Yes     Partners: Male       Family Hx:  Family History   Problem Relation Age of Onset   • Cancer Father         LYMPHATIC   • Prostate cancer Father    • Lymphoma Father    • Alcohol abuse Brother    • Colon cancer Maternal Grandmother    • Malig Hyperthermia  "Neg Hx        Review of Imaging (Interpretation of images not reports):      Laboratory Results:   Lab Results   Component Value Date    GLUCOSE 101 (H) 08/03/2022    CALCIUM 7.5 (L) 08/03/2022     (L) 08/03/2022    K 4.5 08/03/2022    CO2 19.0 (L) 08/03/2022     08/03/2022    BUN 11 08/03/2022    CREATININE 1.12 (H) 08/03/2022    EGFRIFAFRI 95 11/24/2020    EGFRIFNONA 46 (L) 02/14/2022    BCR 9.8 08/03/2022    ANIONGAP 10.0 08/03/2022     Lab Results   Component Value Date    WBC 0.68 (C) 08/03/2022    HGB 9.6 (L) 08/03/2022    HCT 30.7 (L) 08/03/2022    MCV 94.5 08/03/2022     08/03/2022     No results found for: CHOL  Lab Results   Component Value Date    HDL 44 05/31/2022    HDL 59 11/22/2021    HDL 67 (H) 11/24/2020     Lab Results   Component Value Date     (H) 05/31/2022     (H) 11/22/2021     (H) 11/24/2020     Lab Results   Component Value Date    TRIG 183 (H) 05/31/2022    TRIG 172 (H) 11/22/2021    TRIG 201 (H) 11/24/2020     No results found for: HGBA1C  Lab Results   Component Value Date    INR 1.01 11/26/2021    INR 1.1 10/14/2021    INR 0.97 05/18/2021    PROTIME 13.1 11/26/2021    PROTIME 13.1 10/14/2021    PROTIME 12.7 05/18/2021         Physical Examination:   /86 (BP Location: Left arm, Patient Position: Lying)   Pulse 101   Temp (!) 101.9 °F (38.8 °C) (Oral)   Resp 18   Ht 160 cm (63\")   Wt 76.9 kg (169 lb 8.5 oz)   LMP  (LMP Unknown)   SpO2 98%   BMI 30.03 kg/m²   General Appearance:   Well developed, well nourished, well groomed, alert, and cooperative.  HEENT: Normocephalic. Atraumatic. No JVD, no tracheal deviation.  Neck and Spine: Normal range of motion.  Normal alignment. No mass or tenderness. No bruits.  Cardiac: Regular rate and rhythm. No murmurs.  Pulmonary: No shortness of breath, clear anteriorly to auscultation  Abdomen:  Soft, non-tender, non-distended   Peripheral Vasculature: Radial pulses are equal and symmetric. No signs of " distal embolization.  Extremities:    No edema or deformities.   Skin:    No rashes or discoloration    Neurological examination:  Higher Integrative  Function: Alert and oriented. Normal language without paraphasic errors, full visual fields, no neglect. Able to perform complex right left. Could name presidentsX2 but struggled with Obama.   CN II: Left pupil ovoid shape but reactive; patient reports it has always been that way.  CN III IV VI: Extraocular movements are full without nystagmus.   CN V: Normal facial sensation   CN VII: Facial movements are symmetric. No labial dysarthria  CN VIII:   Auditory acuity is normal.  CN IX & X:   No palatal or pharnygeal dysarthria.  CN XI: Turns head without abnormality.   CN XII:   The tongue is midline.  No lingual dysarthria.   Motor: Difficulty lifting and holding both legs but with strong dorsiflexion and plantarflexion. No pronator drift and normal upper limb strength  Reflexes: 2+ in the upper and lower extremities. Plantar responses are flexor.  Sensation: Normal to light touch   Station and Gait: Deferred for bed rest    Coordination: Finger to nose test shows no dysmetria.  Rapid alternating movements are normal.  Heel to shin limited by pain and weakness but not grossly ataxic.    NIHSS:     Diagnoses / Discussion:   1) Acute difficulty with gait overnight. Agree with MRI of brain and lumbar spine for structural lesion. At high risk for stroke and potentially metastases.  2) Confusion - May be consistent with polypharmacy or intermittent encephalopathy No overt seizure like episodes. If MRI negative, consider adjusting medicine. On the differential would be a chronic meningitis. Patient does not have headache or neck pain.  3) Pain management - Consider a fentanyl patch at a light dose which may help her regular pain medicine work with less risk of confusional episodes.    Plan:  MRI head and lumbar spine with and without contrast.      Call 911 for stroke any  stroke symptoms    I have discussed the above with the patient and family.  Time spent with patient: 60min    MDM  Reviewed: previous chart, nursing note and vitals  Reviewed previous: labs and CT scan  Interpretation: CT scan and labs  Total time providing critical care: 30-74 minutes. This excludes time spent performing separately reportable procedures and services.         Telly Andujar MD  Neurology

## 2022-08-03 NOTE — NURSING NOTE
Discussed appropriateness for pt to remain on [facility] vs [facility] at this time pending brain MRI. Neuro MD advised that pt may remain on [facility] at this time.

## 2022-08-03 NOTE — ED PROVIDER NOTES
The ISABEL and I have discussed this patient's history, physical exam and treatment plan.  I provided a substantive portion of the care of this patient.  I have reviewed the documentation and personally had a face to face interaction with the patient and personally performed the physical exam, in its entirety.  I affirm the documentation and agree with the treatment and plan.  The following describes my personal findings.      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  Patient denies visual disturbance, speech disturbance, unilateral weakness or numbness.  Patient denies headache, recent falls, fever, cough, abdominal pain.    Comprehensive Physical exam:  Patient is nontoxic appearing disoriented, conversant, cooperative awake, alert  HEENT: normocephalic, atraumatic  Neck: No JVD no goiter, no pain with ROM  Pulmonary: Nontachypneic, breath sounds are well bilaterally  cardiovascular: Nontachycardic, regular rate without obvious murmur  Abdomen: Soft, nontender  musculoskeletal: Good range of motion, pulse, sensation x4  Neuro/psychiatric:calm, appropriate, cooperative, patient reports that it is the first week in July (it is the first week in August)  Extremities well, normal sensation, he extraocular motion intact, no gait gaze deficit, cranial nerves within normal limits, patient unable to stand independently in the ER secondary to problems with balance patient family reports that she has been on steroids daily for the past 9 days post her last treatment.  Skin:warm, dry    Will admit for recheck, further testing, treatment as needed for ataxia, confusion.    Patient was wearing facemask when I entered the room and  throughout our encounter. Full protective equipment was worn throughout this patient encounter including a face mask, eye protection and gloves. Hand hygiene was performed before donning protective equipment and after removal when leaving the room.           Priyanka Smith MD  08/03/22 0919

## 2022-08-04 ENCOUNTER — APPOINTMENT (OUTPATIENT)
Dept: MRI IMAGING | Facility: HOSPITAL | Age: 57
End: 2022-08-04

## 2022-08-04 LAB
ADV 40+41 DNA STL QL NAA+NON-PROBE: NOT DETECTED
ALBUMIN SERPL-MCNC: 3.6 G/DL (ref 3.5–5.2)
ALBUMIN/GLOB SERPL: 1.1 G/DL
ALP SERPL-CCNC: 129 U/L (ref 39–117)
ALT SERPL W P-5'-P-CCNC: 28 U/L (ref 1–33)
ANION GAP SERPL CALCULATED.3IONS-SCNC: 11.4 MMOL/L (ref 5–15)
AST SERPL-CCNC: 26 U/L (ref 1–32)
ASTRO TYP 1-8 RNA STL QL NAA+NON-PROBE: NOT DETECTED
BILIRUB SERPL-MCNC: 0.4 MG/DL (ref 0–1.2)
BUN SERPL-MCNC: 12 MG/DL (ref 6–20)
BUN/CREAT SERPL: 11.5 (ref 7–25)
C CAYETANENSIS DNA STL QL NAA+NON-PROBE: NOT DETECTED
C COLI+JEJ+UPSA DNA STL QL NAA+NON-PROBE: NOT DETECTED
CALCIUM SPEC-SCNC: 7.5 MG/DL (ref 8.6–10.5)
CHLORIDE SERPL-SCNC: 104 MMOL/L (ref 98–107)
CHOLEST SERPL-MCNC: 154 MG/DL (ref 0–200)
CO2 SERPL-SCNC: 17.6 MMOL/L (ref 22–29)
CREAT SERPL-MCNC: 1.04 MG/DL (ref 0.57–1)
CRYPTOSP DNA STL QL NAA+NON-PROBE: NOT DETECTED
DEPRECATED RDW RBC AUTO: 39.5 FL (ref 37–54)
E HISTOLYT DNA STL QL NAA+NON-PROBE: NOT DETECTED
EAEC PAA PLAS AGGR+AATA ST NAA+NON-PRB: NOT DETECTED
EC STX1+STX2 GENES STL QL NAA+NON-PROBE: NOT DETECTED
EGFRCR SERPLBLD CKD-EPI 2021: 62 ML/MIN/1.73
EPEC EAE GENE STL QL NAA+NON-PROBE: NOT DETECTED
ERYTHROCYTE [DISTWIDTH] IN BLOOD BY AUTOMATED COUNT: 11.9 % (ref 12.3–15.4)
ETEC LTA+ST1A+ST1B TOX ST NAA+NON-PROBE: NOT DETECTED
FOLATE SERPL-MCNC: >20 NG/ML (ref 4.78–24.2)
G LAMBLIA DNA STL QL NAA+NON-PROBE: NOT DETECTED
GLOBULIN UR ELPH-MCNC: 3.2 GM/DL
GLUCOSE BLDC GLUCOMTR-MCNC: 109 MG/DL (ref 70–130)
GLUCOSE SERPL-MCNC: 108 MG/DL (ref 65–99)
HBA1C MFR BLD: 5.2 % (ref 4.8–5.6)
HCT VFR BLD AUTO: 30.7 % (ref 34–46.6)
HDLC SERPL-MCNC: 57 MG/DL (ref 40–60)
HGB BLD-MCNC: 9.7 G/DL (ref 12–15.9)
LDLC SERPL CALC-MCNC: 79 MG/DL (ref 0–100)
LDLC/HDLC SERPL: 1.35 {RATIO}
MCH RBC QN AUTO: 29.2 PG (ref 26.6–33)
MCHC RBC AUTO-ENTMCNC: 31.6 G/DL (ref 31.5–35.7)
MCV RBC AUTO: 92.5 FL (ref 79–97)
NOROVIRUS GI+II RNA STL QL NAA+NON-PROBE: NOT DETECTED
P SHIGELLOIDES DNA STL QL NAA+NON-PROBE: NOT DETECTED
PLATELET # BLD AUTO: 183 10*3/MM3 (ref 140–450)
PMV BLD AUTO: 10.2 FL (ref 6–12)
POTASSIUM SERPL-SCNC: 3.9 MMOL/L (ref 3.5–5.2)
PROT SERPL-MCNC: 6.8 G/DL (ref 6–8.5)
RBC # BLD AUTO: 3.32 10*6/MM3 (ref 3.77–5.28)
RVA RNA STL QL NAA+NON-PROBE: NOT DETECTED
S ENT+BONG DNA STL QL NAA+NON-PROBE: NOT DETECTED
SAPO I+II+IV+V RNA STL QL NAA+NON-PROBE: NOT DETECTED
SHIGELLA SP+EIEC IPAH ST NAA+NON-PROBE: NOT DETECTED
SODIUM SERPL-SCNC: 133 MMOL/L (ref 136–145)
TRIGL SERPL-MCNC: 101 MG/DL (ref 0–150)
TSH SERPL DL<=0.05 MIU/L-ACNC: 0.55 UIU/ML (ref 0.27–4.2)
V CHOL+PARA+VUL DNA STL QL NAA+NON-PROBE: NOT DETECTED
V CHOLERAE DNA STL QL NAA+NON-PROBE: NOT DETECTED
VIT B12 BLD-MCNC: 392 PG/ML (ref 211–946)
VLDLC SERPL-MCNC: 18 MG/DL (ref 5–40)
WBC NRBC COR # BLD: 1.16 10*3/MM3 (ref 3.4–10.8)
Y ENTEROCOL DNA STL QL NAA+NON-PROBE: NOT DETECTED

## 2022-08-04 PROCEDURE — 70553 MRI BRAIN STEM W/O & W/DYE: CPT

## 2022-08-04 PROCEDURE — 72158 MRI LUMBAR SPINE W/O & W/DYE: CPT

## 2022-08-04 PROCEDURE — 99232 SBSQ HOSP IP/OBS MODERATE 35: CPT | Performed by: INTERNAL MEDICINE

## 2022-08-04 PROCEDURE — 97162 PT EVAL MOD COMPLEX 30 MIN: CPT

## 2022-08-04 PROCEDURE — 25010000002 FILGRASTIM 300 MCG/0.5ML SOLUTION PREFILLED SYRINGE: Performed by: INTERNAL MEDICINE

## 2022-08-04 PROCEDURE — 82378 CARCINOEMBRYONIC ANTIGEN: CPT | Performed by: INTERNAL MEDICINE

## 2022-08-04 PROCEDURE — 25010000002 CEFEPIME PER 500 MG: Performed by: INTERNAL MEDICINE

## 2022-08-04 PROCEDURE — 80061 LIPID PANEL: CPT | Performed by: HOSPITALIST

## 2022-08-04 PROCEDURE — 99221 1ST HOSP IP/OBS SF/LOW 40: CPT

## 2022-08-04 PROCEDURE — 80053 COMPREHEN METABOLIC PANEL: CPT | Performed by: HOSPITALIST

## 2022-08-04 PROCEDURE — 72156 MRI NECK SPINE W/O & W/DYE: CPT

## 2022-08-04 PROCEDURE — 83036 HEMOGLOBIN GLYCOSYLATED A1C: CPT | Performed by: HOSPITALIST

## 2022-08-04 PROCEDURE — 82607 VITAMIN B-12: CPT | Performed by: INTERNAL MEDICINE

## 2022-08-04 PROCEDURE — 99222 1ST HOSP IP/OBS MODERATE 55: CPT | Performed by: INTERNAL MEDICINE

## 2022-08-04 PROCEDURE — 82746 ASSAY OF FOLIC ACID SERUM: CPT | Performed by: INTERNAL MEDICINE

## 2022-08-04 PROCEDURE — 85027 COMPLETE CBC AUTOMATED: CPT | Performed by: HOSPITALIST

## 2022-08-04 PROCEDURE — 99233 SBSQ HOSP IP/OBS HIGH 50: CPT | Performed by: NURSE PRACTITIONER

## 2022-08-04 PROCEDURE — 82962 GLUCOSE BLOOD TEST: CPT

## 2022-08-04 PROCEDURE — 0 GADOBENATE DIMEGLUMINE 529 MG/ML SOLUTION: Performed by: HOSPITALIST

## 2022-08-04 PROCEDURE — 87507 IADNA-DNA/RNA PROBE TQ 12-25: CPT | Performed by: HOSPITALIST

## 2022-08-04 PROCEDURE — A9577 INJ MULTIHANCE: HCPCS | Performed by: HOSPITALIST

## 2022-08-04 PROCEDURE — 84443 ASSAY THYROID STIM HORMONE: CPT | Performed by: HOSPITALIST

## 2022-08-04 PROCEDURE — 97530 THERAPEUTIC ACTIVITIES: CPT

## 2022-08-04 PROCEDURE — 72157 MRI CHEST SPINE W/O & W/DYE: CPT

## 2022-08-04 PROCEDURE — 25010000002 DIAZEPAM PER 5 MG: Performed by: HOSPITALIST

## 2022-08-04 RX ORDER — ATORVASTATIN CALCIUM 20 MG/1
10 TABLET, FILM COATED ORAL NIGHTLY
Status: DISCONTINUED | OUTPATIENT
Start: 2022-08-04 | End: 2022-08-09 | Stop reason: HOSPADM

## 2022-08-04 RX ADMIN — CEFEPIME 2 G: 2 INJECTION, POWDER, FOR SOLUTION INTRAVENOUS at 03:55

## 2022-08-04 RX ADMIN — OXYCODONE AND ACETAMINOPHEN 1 TABLET: 5; 325 TABLET ORAL at 21:36

## 2022-08-04 RX ADMIN — FILGRASTIM 300 MCG: 300 INJECTION, SOLUTION INTRAVENOUS; SUBCUTANEOUS at 16:06

## 2022-08-04 RX ADMIN — PROPRANOLOL HYDROCHLORIDE 60 MG: 60 CAPSULE, EXTENDED RELEASE ORAL at 09:06

## 2022-08-04 RX ADMIN — GADOBENATE DIMEGLUMINE 15 ML: 529 INJECTION, SOLUTION INTRAVENOUS at 10:55

## 2022-08-04 RX ADMIN — GABAPENTIN 600 MG: 300 CAPSULE ORAL at 15:20

## 2022-08-04 RX ADMIN — Medication 1 MG: at 09:05

## 2022-08-04 RX ADMIN — OLANZAPINE 5 MG: 5 TABLET ORAL at 21:36

## 2022-08-04 RX ADMIN — DIAZEPAM 5 MG: 5 INJECTION, SOLUTION INTRAMUSCULAR; INTRAVENOUS at 09:18

## 2022-08-04 RX ADMIN — ALPRAZOLAM 0.5 MG: 0.5 TABLET ORAL at 21:35

## 2022-08-04 RX ADMIN — Medication 10 ML: at 09:18

## 2022-08-04 RX ADMIN — ATORVASTATIN CALCIUM 10 MG: 20 TABLET, FILM COATED ORAL at 21:35

## 2022-08-04 RX ADMIN — Medication 10 ML: at 21:40

## 2022-08-04 RX ADMIN — FLUOXETINE HYDROCHLORIDE 40 MG: 20 CAPSULE ORAL at 09:07

## 2022-08-04 RX ADMIN — LEVOTHYROXINE SODIUM 25 MCG: 0.03 TABLET ORAL at 05:59

## 2022-08-04 RX ADMIN — BUPROPION HYDROCHLORIDE 150 MG: 150 TABLET, EXTENDED RELEASE ORAL at 09:05

## 2022-08-04 RX ADMIN — GABAPENTIN 600 MG: 300 CAPSULE ORAL at 05:59

## 2022-08-04 RX ADMIN — SODIUM CHLORIDE 75 ML/HR: 9 INJECTION, SOLUTION INTRAVENOUS at 06:01

## 2022-08-04 RX ADMIN — LETROZOLE 2.5 MG: 2.5 TABLET ORAL at 09:05

## 2022-08-04 RX ADMIN — ASPIRIN 325 MG: 325 TABLET ORAL at 09:05

## 2022-08-04 RX ADMIN — PANTOPRAZOLE SODIUM 40 MG: 40 TABLET, DELAYED RELEASE ORAL at 06:01

## 2022-08-04 RX ADMIN — GABAPENTIN 600 MG: 300 CAPSULE ORAL at 21:35

## 2022-08-04 RX ADMIN — CEFEPIME 2 G: 2 INJECTION, POWDER, FOR SOLUTION INTRAVENOUS at 15:21

## 2022-08-04 NOTE — PLAN OF CARE
Goal Outcome Evaluation:  Plan of Care Reviewed With: patient           Outcome Evaluation: Patient is a 56 yo female who presented with confusion and ataxia. MRI (-) for stroke. Pt with Adenocarcinoma of the lung with bone mets. She lives with her mother who has dementia in a quad level home. She reports she is ind at baseline without use of AD. Today she presents with R hip/buttock pain and decreased R hip flexor strength, and decreased balance. She completed STS with CGA and ambulated 90ft without AD requiring CGA-minAx2. Pt is unsteady on her feet with minAx2 for balance at times and she veers R<>L during ambulation. She will likely continue to benefit from skilled PT to address functional deficits. PT rec home with assist at dc and possibly OP PT vs HHPT.

## 2022-08-04 NOTE — THERAPY EVALUATION
Patient Name: Holli Patel  : 1965    MRN: [id number]                              Today's Date: 2022       Admit Date: 8/3/2022    Visit Dx:     ICD-10-CM ICD-9-CM   1. Confusion  R41.0 298.9   2. Weakness  R53.1 780.79   3. Ataxia  R27.0 781.3   4. 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)  C34.90 162.9    C79.51 198.5     Patient Active Problem List   Diagnosis   • History of gastroesophageal reflux (GERD)   • Primary insomnia   • Mixed hyperlipidemia   • Other specified hypothyroidism   • Arthritis   • Anxiety   • History of migraine   • Essential hypertension   • History of colon polyps   • Diverticulosis   • Leukocytosis   • Personal history of tobacco use   • Osteopenia   • Dyspnea   • Malignant neoplasm of upper-outer quadrant of right breast in female, estrogen receptor positive (HCC)   • Malignant neoplasm of upper-inner quadrant of left breast in female, estrogen receptor positive (HCC)   • Primary adenocarcinoma of upper lobe of left lung (HCC)   • Encounter for fitting and adjustment of vascular catheter   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)   • 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)   • History of bilateral breast cancer   • History of DVT (deep vein thrombosis)   • Stage 3a chronic kidney disease (HCC)   • Confusion   • Chemotherapy-induced neutropenia (HCC)   • Metabolic acidosis   • Ataxia   • Bony metastasis (HCC)     Past Medical History:   Diagnosis Date   • Anxiety    • Clot     POSSIBLE BLOOD CLOT IN VENOUS ACCESS DEVICE-PT STATES WAS STARTED ON ELIQUIS    • Dyspnea on exertion    • Elevated cholesterol    • Frequent urination     DAY AND NIGHT   • SHAYY (generalized anxiety disorder)     RELATED HIGH BLOOD PRESSURE   • GERD (gastroesophageal reflux disease)    • High blood pressure     ANXIETY RELATED   • Hyperlipidemia    • Hypothyroidism    • Lung cancer (HCC)    • Lung nodule     LEFT   • Malignant neoplasm of upper-outer  quadrant of right breast in female, estrogen receptor positive (HCC) 4/13/2021    PT STATES HAS BILAT BREAST CANCER   • Migraine    • PONV (postoperative nausea and vomiting)      Past Surgical History:   Procedure Laterality Date   • BREAST BIOPSY Bilateral 04/07/2021    Right Breast 10 o'clock & Left breast 10 o'clock 6 cm from nipple, [facility]   • CLOSED REDUCTION HIP DISLOCATION Left 4/30/2021    Procedure: BRONCHOSCOPY, LEFT VIDEO ASSITED THORACOSCOPY, ROBOTIC ASSSITED MEDIASTINAL LYMPHADENECTOMY WITH INTERCOSTAL NERVE BLOCKS;  Surgeon: Raphael Kerr III, MD;  Location: [facility];  Service: DaVinci;  Laterality: Left;   • COLONOSCOPY     • TUBAL ABDOMINAL LIGATION     • VENOUS ACCESS DEVICE (PORT) INSERTION Right 5/20/2021    Procedure: INSERTION VENOUS ACCESS DEVICE;  Surgeon: Raphael Kerr III, MD;  Location: [facility] OR;  Service: Thoracic;  Laterality: Right;   • VENOUS ACCESS DEVICE (PORT) INSERTION Right 11/29/2021    Procedure: REVISION AND REPLACEMENT RIGHT SUBCLAVIAN VENOUS ACCESS PORT;  Surgeon: Raphael Kerr III, MD;  Location: [facility];  Service: Thoracic;  Laterality: Right;   • WRIST SURGERY Right       General Information     Row Name 08/04/22 1523          Physical Therapy Time and Intention    Document Type evaluation  -CB     Mode of Treatment individual therapy;physical therapy  -CB     Row Name 08/04/22 1523          General Information    Patient Profile Reviewed yes  -CB     Prior Level of Function independent:;gait;transfer;bed mobility;ADL's;using stairs  -CB     Existing Precautions/Restrictions fall  -CB     Barriers to Rehab medically complex  -CB     Row Name 08/04/22 1523          Living Environment    People in Home --  pt lives with her mother with dementia and  there often as well  -CB     Row Name 08/04/22 1523          Home Main Entrance    Number of Stairs, Main Entrance six  -CB     Stair Railings, Main Entrance railing on left side (ascending)   -CB     Row Name 08/04/22 1523          Stairs Within Home, Primary    Stairs, Within Home, Primary quad level home with 1 step to pts room, 5 steps to kitchen  -CB     Row Name 08/04/22 1523          Cognition    Orientation Status (Cognition) oriented x 4  -CB     Row Name 08/04/22 1523          Safety Issues, Functional Mobility    Impairments Affecting Function (Mobility) balance;endurance/activity tolerance;pain;strength  -CB           User Key  (r) = Recorded By, (t) = Taken By, (c) = Cosigned By    Initials Name Provider Type    CB Marley Blount, PT Physical Therapist               Mobility     Row Name 08/04/22 1524          Bed Mobility    Comment, (Bed Mobility) sitting EOB upon arrival to room  -CB     Row Name 08/04/22 1524          Sit-Stand Transfer    Sit-Stand Bennett (Transfers) contact guard  -CB     Row Name 08/04/22 1524          Gait/Stairs (Locomotion)    Bennett Level (Gait) contact guard;minimum assist (75% patient effort);2 person assist;verbal cues  -CB     Assistive Device (Gait) --  no AD  -CB     Distance in Feet (Gait) 90ft  -CB     Deviations/Abnormal Patterns (Gait) gait speed decreased;stride length decreased  -CB     Bilateral Gait Deviations heel strike decreased  -CB     Comment, (Gait/Stairs) unsteadiness noted and pt veers R<>L during ambulation. minAx2 for balance during ambulation at times  -CB           User Key  (r) = Recorded By, (t) = Taken By, (c) = Cosigned By    Initials Name Provider Type    Marley Ambriz, PT Physical Therapist               Obj/Interventions     Row Name 08/04/22 1525          Range of Motion Comprehensive    Comment, General Range of Motion decreased R hip flexion due to pain  -CB     Row Name 08/04/22 1525          Strength Comprehensive (MMT)    Comment, General Manual Muscle Testing (MMT) Assessment R hip 3+/5 and all other LE MMT 5/5  -CB     Row Name 08/04/22 1525          Balance    Balance Assessment standing static  balance;standing dynamic balance;sitting dynamic balance;sitting static balance  -CB     Static Sitting Balance independent  -CB     Dynamic Sitting Balance independent  -CB     Position, Sitting Balance sitting edge of bed  -CB     Static Standing Balance contact guard  -CB     Dynamic Standing Balance contact guard;minimal assist;2-person assist;verbal cues  -CB     Position/Device Used, Standing Balance unsupported  -CB     Balance Interventions sitting;sit to stand;standing;supported;static;dynamic;minimal challenge  -CB     Row Name 08/04/22 1525          Sensory Assessment (Somatosensory)    Sensory Assessment (Somatosensory) sensation intact  -CB           User Key  (r) = Recorded By, (t) = Taken By, (c) = Cosigned By    Initials Name Provider Type    CB Marley Blount, PT Physical Therapist               Goals/Plan     Row Name 08/04/22 1531          Bed Mobility Goal 1 (PT)    Activity/Assistive Device (Bed Mobility Goal 1, PT) bed mobility activities, all  -CB     Grundy Level/Cues Needed (Bed Mobility Goal 1, PT) independent  -CB     Time Frame (Bed Mobility Goal 1, PT) long term goal (LTG);1 week  -CB     Row Name 08/04/22 1531          Transfer Goal 1 (PT)    Activity/Assistive Device (Transfer Goal 1, PT) sit-to-stand/stand-to-sit;bed-to-chair/chair-to-bed  -CB     Grundy Level/Cues Needed (Transfer Goal 1, PT) modified independence  -CB     Time Frame (Transfer Goal 1, PT) long term goal (LTG);1 week  -CB     Row Name 08/04/22 1531          Gait Training Goal 1 (PT)    Activity/Assistive Device (Gait Training Goal 1, PT) gait (walking locomotion)  -CB     Grundy Level (Gait Training Goal 1, PT) modified independence  -CB     Distance (Gait Training Goal 1, PT) 100ft  -CB     Time Frame (Gait Training Goal 1, PT) long term goal (LTG);1 week  -CB     Row Name 08/04/22 1531          Therapy Assessment/Plan (PT)    Planned Therapy Interventions (PT) balance training;bed mobility  training;gait training;home exercise program;patient/family education;strengthening;transfer training  -CB           User Key  (r) = Recorded By, (t) = Taken By, (c) = Cosigned By    Initials Name Provider Type    CB Marley Blount, PT Physical Therapist               Clinical Impression     Row Name 08/04/22 1526          Pain    Pretreatment Pain Rating 0/10 - no pain  -CB     Posttreatment Pain Rating 4/10  -CB     Pain Location - Side/Orientation Right  -CB     Pain Location - hip;buttock  -CB     Pain Intervention(s) Repositioned;Ambulation/increased activity  -CB     Row Name 08/04/22 1526          Plan of Care Review    Plan of Care Reviewed With patient  -CB     Outcome Evaluation Patient is a 58 yo female who presented with confusion and ataxia. MRI (-) for stroke. Pt with Adenocarcinoma of the lung with bone mets. She lives with her mother who has dementia in a quad level home. She reports she is ind at baseline without use of AD. Today she presents with R hip/buttock pain and decreased R hip flexor strength, and decreased balance. She completed STS with CGA and ambulated 90ft without AD requiring CGA-minAx2. Pt is unsteady on her feet with minAx2 for balance at times and she veers R<>L during ambulation. She will likely continue to benefit from skilled PT to address functional deficits. PT rec home with assist at dc and possibly OP PT vs HHPT.  -CB     Row Name 08/04/22 1526          Therapy Assessment/Plan (PT)    Rehab Potential (PT) good, to achieve stated therapy goals  -CB     Criteria for Skilled Interventions Met (PT) yes  -CB     Therapy Frequency (PT) 5 times/wk  -CB     Row Name 08/04/22 1526          Vital Signs    Recovery Time all VSS  -CB     Row Name 08/04/22 1526          Positioning and Restraints    Pre-Treatment Position in bed  -CB     Post Treatment Position bed  -CB     In Bed sitting EOB;encouraged to call for assist;call light within reach;notified nsg;with family/caregiver  -CB            User Key  (r) = Recorded By, (t) = Taken By, (c) = Cosigned By    Initials Name Provider Type    CB Marley Blount PT Physical Therapist               Outcome Measures     Row Name 08/04/22 1532          How much help from another person do you currently need...    Turning from your back to your side while in flat bed without using bedrails? 3  -CB     Moving from lying on back to sitting on the side of a flat bed without bedrails? 3  -CB     Moving to and from a bed to a chair (including a wheelchair)? 3  -CB     Standing up from a chair using your arms (e.g., wheelchair, bedside chair)? 3  -CB     Climbing 3-5 steps with a railing? 3  -CB     To walk in hospital room? 3  -CB     AM-PAC 6 Clicks Score (PT) 18  -CB     Highest level of mobility 6 --> Walked 10 steps or more  -CB     Row Name 08/04/22 1532          Functional Assessment    Outcome Measure Options AM-PAC 6 Clicks Basic Mobility (PT)  -CB           User Key  (r) = Recorded By, (t) = Taken By, (c) = Cosigned By    Initials Name Provider Type    CB Marley Blount PT Physical Therapist                             Physical Therapy Education                 Title: PT OT SLP Therapies (In Progress)     Topic: Physical Therapy (In Progress)     Point: Mobility training (Done)     Learning Progress Summary           Patient Acceptance, E,TB, VU,NR by  at 8/4/2022 1533                   Point: Home exercise program (Not Started)     Learner Progress:  Not documented in this visit.          Point: Body mechanics (Not Started)     Learner Progress:  Not documented in this visit.          Point: Precautions (Not Started)     Learner Progress:  Not documented in this visit.                      User Key     Initials Effective Dates Name Provider Type Discipline     10/22/21 -  Marley Blount, PT Physical Therapist PT              PT Recommendation and Plan  Planned Therapy Interventions (PT): balance training, bed mobility training, gait training,  home exercise program, patient/family education, strengthening, transfer training  Plan of Care Reviewed With: patient  Outcome Evaluation: Patient is a 56 yo female who presented with confusion and ataxia. MRI (-) for stroke. Pt with Adenocarcinoma of the lung with bone mets. She lives with her mother who has dementia in a quad level home. She reports she is ind at baseline without use of AD. Today she presents with R hip/buttock pain and decreased R hip flexor strength, and decreased balance. She completed STS with CGA and ambulated 90ft without AD requiring CGA-minAx2. Pt is unsteady on her feet with minAx2 for balance at times and she veers R<>L during ambulation. She will likely continue to benefit from skilled PT to address functional deficits. PT rec home with assist at dc and possibly OP PT vs HHPT.     Time Calculation:    PT Charges     Row Name 08/04/22 1533             Time Calculation    Start Time 1433  -CB      Stop Time 1448  -CB      Time Calculation (min) 15 min  -CB      PT Received On 08/04/22  -CB      PT - Next Appointment 08/05/22  -CB      PT Goal Re-Cert Due Date 08/11/22  -CB              Time Calculation- PT    Total Timed Code Minutes- PT 8 minute(s)  -CB              Timed Charges    72037 - PT Therapeutic Activity Minutes 8  -CB              Total Minutes    Timed Charges Total Minutes 8  -CB       Total Minutes 8  -CB            User Key  (r) = Recorded By, (t) = Taken By, (c) = Cosigned By    Initials Name Provider Type    CB Marley Blount, PT Physical Therapist              Therapy Charges for Today     Code Description Service Date Service Provider Modifiers Qty    46240049025 HC PT THERAPEUTIC ACT EA 15 MIN 8/4/2022 Marley Blount, PT GP 1    55298868695 HC PT EVAL MOD COMPLEXITY 3 8/4/2022 Marley Blount, PT GP 1    70341777639 HC PT THER SUPP EA 15 MIN 8/4/2022 Marley Blount, PT GP 1          PT G-Codes  Outcome Measure Options: AM-PAC 6 Clicks Basic Mobility (PT)  AM-PAC 6  Clicks Score (PT): 18    Marley Blount, PT  8/4/2022

## 2022-08-04 NOTE — PROGRESS NOTES
ID note for neutropenic sepsis  S: feels okay. Imbalance better. No f/c/sweats this am. Hit 101.9 yesterday afternoon    O: Temp:  [97.2 °F (36.2 °C)-101.9 °F (38.8 °C)] 97.2 °F (36.2 °C)  Heart Rate:  [] 79  Resp:  [18-25] 25  BP: (113-136)/(76-86) 114/76  GENERAL: Awake and alert, in no acute distress.   HEENT:  Hearing is grossly normal.   LUNGS:  normal respiratory effort.   SKIN:  without cutaneous eruptions   PSYCHIATRIC: Appropriate mood, affect, insight, and judgment.         Results Review:  WBC 1.16, plt 183, hgb 9.7  Cr 1.04     Microbiology:  COVID 19 negative  BCx P  GI PCR neg       A/p  Neutropenic sepsis  Ataxia  Metastatic lung adenocarcinoma    Had fevers to ~102 yesterday but have improved with cefepime.  We will cont 2g iv q12.  Increase to q8 dosing in cr improves further. Ataxia better. F/u MRI and cx.  WBC improved as well.

## 2022-08-04 NOTE — SIGNIFICANT NOTE
08/04/22 1425   OTHER   Discipline occupational therapist   Rehab Time/Intention   Session Not Performed other (see comments)  (1411 pt report she is independent, no OT needs. no eval needed per pt report, doing much better. OT signing off.)

## 2022-08-04 NOTE — PLAN OF CARE
Goal Outcome Evaluation:  Plan of Care Reviewed With: patient        Progress: no change  Outcome Evaluation: Pt A&Ox4, with confusion. Gait unsteady, ambulates x1 assist to bedside commode. Pt getting NS 75 through port, regular diet. Waiting on MRI to be done this morning.

## 2022-08-04 NOTE — CONSULTS
Subjective     REASON FOR CONSULTATION:  Neutropenic sepsis.  Provide an opinion on any further workup or treatment                             REQUESTING PHYSICIAN:    Toby Jade MD      Attending     Since 8/3/2022     [phone]         RECORDS OBTAINED:  Records of the patients history including those obtained from the referring provider were reviewed and summarized in detail.    History of Present Illness   On 08/04/2022, I had the opportunity to see in consultation this 57-year-old female who has history of Stage IV lung cancer, undergoing chemotherapy by my partner, Dr. Snell. She also has history of bilateral breast cancer. The breast cancer is not metastatic. In any event, the patient was admitted to the hospital with confusion, fever and she was found to have neutropenia with sepsis. She was placed on antibiotics. The patient has slowly recovered today to the point that she is making sense in regard to her language. She is having some appetite and she has not had any further fever. She denies any sores in her mouth, cough, sputum production, shortness of breath, pleuritic pain or hemoptysis. Her appetite has been very dramatically decreased for the last week. She has had some nausea. She has had some vomiting, occasional loose bowel activity. No abdominal cramping, no passage of blood in the stool. Urination in volume decreased substantially before she came to the hospital. She has not had any rash. She was very confused for a few days, now she feels mentally much better.     Her vital signs have remained stable and she has been seen by hospitalist and Infectious Disease.      Oncology history dr CYRIL SCALES.  Bilateral breast cancer  · Invasive ductal carcinoma in both right and left breast and lesions measure under 1 cm.  No palpable lymphadenopathy although unsure if this has been evaluated by an axillary ultrasound.  No abnormal axillary lymphadenopathy noted on PET.  · Most likely clinical  T1b N0 M0, both cancers were noted to be grade 2, ER/NV positive and HER-2 negative and Ki-67 less than 15%  · VATS on 4/30/2021  · Biopsy confirms adenocarcinoma of pulmonary primary  · Discussed with Dr. Molina about delaying breast surgery and she is in agreement  · Darted on neoadjuvant anastrozole  · Patient experienced significant adverse effects with anastrozole  · Treatment changed to letrozole   · Axillary ultrasound 4/20/2021 -negative for any axillary lymphadenopathy  · Started letrozole in May 2021  · She continues on letrozole without any problems.  · 8/24/2021 bilateral diagnostic mammogram and ultrasound.  In the left breast the tumor now measures 1 cm on mammogram as opposed to 0.8 cm in March.  Right breast calcifications also noted to be in a large extent although the number seems to be decreased.  Hematoma has decreased in size.  · 10/26/2021-bilateral diagnostic mammogram and ultrasound-decrease in size of the bilateral breast lesions.  · 10/26/2021-PET/CT-metastatic lesions in the left adrenal gland, L1-L2.  · 10/26/2021-MRI of the brain-no evidence of metastatic disease  · 3/23/2022-bilateral diagnostic mammogram and ultrasound show complete resolution of the tumors.  · Continue letrozole, no evidence of disease progression     *Adenocarcinoma of the left lung, metastatic   · Most likely stage T1 N2 M0, stage III  · MRI of the thoracic spine ordered to further evaluate the abnormality seen on PET  · MRI of the brain negative   · Given that she is only 55 and fairly good performance status I discussed with her by with concurrent chemoradiation with cisplatin and Alimta every 3 weeks followed by durvalumab.  · Adverse effects including but not limited to myelosuppression, increased risk of infections, nausea, vomiting, renal dysfunction, ototoxicity, neurotoxicity have been discussed at length.  · MRI of the thoracic spine performed 5/24/2021 shows no evidence of metastatic disease in the  thoracic spine however in L1 there is a 7 mm lesion which is indeterminate.  A lumbar spine MRI has been recommended.  · Discussed her case with Dr. Oates and he feels like MRI of the lumbar spine may also show that this lesion is indeterminate.  · Discussed the same with the patient and her .  · Even if this is metastatic disease this might be the only lesion of metastatic disease and then would consider this oligometastatic disease.  · Therefore plan to proceed with concurrent chemoradiation as scheduled.  · cisplatin and Alimta, C1D1 5/25/2021  · Day 1 of radiation 5/25/2021  · 5/27/2021-MRI of the lumbar spine-there is a 6 x 5 x 6 mm enhancing lesion in the left posterior body of L1 vertebra.  This is considered indeterminate.  Follow-up recommended.  Degenerative changes noted at L5-S1  · 7/7/2021, cycle 3 cisplatin Alimta  · 7/12/2021-radiation completed  · PET/CT 8/6/2021 with good response to chemoradiation.  · MRI of the lumbar spine 10/1/2021 with increase in size of the L1 lesion which now measures 14 mm as opposed to 6 mm in May 2021.  · 10/14/2021-biopsy of the L1 lesion and pathology consistent with adenocarcinoma of the lung, TPS 0  · 0/26/2021-PET/CT-metastatic lesions in the left adrenal gland, L1-L2.  · 10/26/2021-MRI of the brain-no evidence of metastatic disease  · Given that she only has metastatic disease in L1-L2 and left adrenal gland I think it would be possible to radiate all these regions.  · Completed radiation to L1-L2 on 11/19/2021  · 11/22/2021-cycle 1 of Keytruda and Alimta  · Completed radiation to the left adrenal gland  · 1/18/2022-PET/CT-images independently reviewed and interpreted by me.  Decrease in size of the 7 mm pulmonary nodule in the left upper lobe which previously measured 9 mm.  Radiation pneumonitis is nearly resolved.  Hypermetabolic activity at the L1-L2 metastasis has resolved.  No new suspicious metastatic disease.  · Guardant 360 circulating DNA level  decreasing.  · 3/7/2022-patient is complaining of fluid retention.  · Thought to be secondary to Alimta and started on steroids and Lasix.  · Developed CHASITY on Lasix since Lasix discontinued.  · Alimta dose has been reduced.   · PET/CT 4/11/2022 concerning for disease progression in the left adrenal gland as well as L1 vertebra on the left.  · Case presented and discussed at the thoracic multidisciplinary conference.  The plan is to proceed with Keytruda and Alimta and radiation to the left adrenal gland and the L1 vertebra.  · She will be referred back to radiation oncology, she had to reschedule her appointment due to COVID-19 infection in the family.    · Evaluated by radiation oncology on 6/1/2022 and plan is to proceed with L1 and adrenal radiation.  MRI of the lumbar spine performed 6/6/2022  · Read pending  · 6/7/22: GFR still low; hold Alimta but proceed with Keytruda   · 6/28/22:This does show concerning area at L1 as well as concerning enlarging left adrenal gland nodule.  This is consistent with prior imaging.  This MRI was obtained specifically to help radiation oncology decide if additional radiation is possible.   · Completed 5 treatments to the lumbar spine, 30 Armstrong.  Radiation completed on 7/14/2022  · PET/CT 7/18/2022-multiple areas of disease progression noted in the spine and in the right sacrum.  · Alimta has been on hold since 5/10/2022 due to renal dysfunction.  · She continued on Keytruda without any interruption.  · Although she was off Alimta for 2 months even prior to holding Alimta there was concern for disease progression on the PET/CT.  · Due to this reason we will discontinue Alimta and Keytruda and start Taxotere 75 mg per metered squared every 3 weeks.  · Adverse effects of Taxotere including but not limited to myelosuppression, increased risk of infections, nausea, vomiting, neuropathy, alopecia, infusion reactions, fluid retention discussed.  · Guardant 360,  7/20/2022 without any  actionable mutations  · 7/26/2022-cycle 1 Taxotere  · 8/1/2022-CBC reviewed and noted to be neutropenic.  Afebrile.  No indication for antibiotics.  · Will try to get Neulasta approved for subsequent cycles     *Neutropenia  · 8/2/2022 WBC count 0.51 with an absolute neutrophil count of 0.08  · Secondary to Taxotere  · Will obtain Neulasta approved for subsequent cycles     *Back pain  · Continue Percocets as needed  · Decrease MS Contin dose to 15 mg twice daily.  Reports excessive drowsiness with 30 mg twice daily.     *Neuropathy in lower extremities  · Unchanged        *Fluid retention  · Resolved now that she is off Alimta     *CHASITY  · Most likely secondary to Lasix  · Discontinue Lasix  · 1 L normal saline 3/28/2022  · Kidney function improved, Creatinine 1.69 and BUN 23.  Encourage patient to increase oral intake.  Discouraged intake of caffeinated beverages.  Patient to return in 1 week for labs only around the time of her schedule PET scan appointment.  · 4/19/2022-creatinine 1.36.  · 5/10/2022-creatinine elevated at 1.96.  · 5/31/2022 creatinine elevated at 2.01  · 6/7/2022-creatinine 1.76. Alimta held, continue Keytruda  · 6/28/2022 creatinine 1.5, GFR 40.  Patient will see nephrology for new patient consultation 7/7/2022.  Continue to hold Alimta for now.  · 8/2/2022-creatinine stable at 1.3  · Continue follow-up with nephrology     *Anemia  · Hemoglobin 7.9   · Symptomatic  · 2 units of PRBC  · Hgb improved to 9.8 today   · 4/11/2022 hemoglobin stable at 8.8  · 5/31/2022-hemoglobin lower at 8.4  · 6/7/2022-hemoglobin improved at 8.8  · 6/28/2022-hemoglobin improved up to 10.4 (likely related to holding Alimta)  · 8/2/2022-hemoglobin stable at 10.4.     *Bilateral breast cancer-no family history of breast cancer but given synchronous breast cancer genetic testing has been sent.  Genetic testing with a variant of unknown significance.     *Right hand numbness and difficulty with grasping  objects  · Previously had history of carpal tunnel requiring repair.  · She is currently on letrozole which could cause worsening of the carpal tunnel  · Gabapentin dose will be increased to 600 mg 3 times daily  · She will also be referred to hand surgery for further evaluation.  · She now reports bilateral upper extremity numbness.  · Continue gabapentin  · MRI of the brain 10/26/2021 without any evidence of metastatic disease  · Not an issue today      *Anxiety-  · Currently on Xanax 1 mg nightly  · She required Valium for the MRI as she had claustrophobia and a panic attack  · Continue Zyprexa  · Mood stable     *Depression  · Continue Zyprexa  · Related to cancer diagnosis and current situation where she is having to care for her elderly mother.  · Reports being extremely tired.  Continues to struggle with depression.  · Stable     *Left-sided chest wall pain-secondary to VATS.  Most likely neuropathic.  Continue gabapentin and Norco as needed.  She ran out of gabapentin and experienced leg cramps.    Continue gabapentin  Improved     *Hypertension-  · Lisinopril discontinued due to CHASITY per above.    · Blood pressure normal at 115/82     *Smoking-not addressed today     *GERD  · 7/7/2021, patient complains of reflux despite the use of Prilosec 20 mg twice daily.  We will send her a prescription for Carafate slurry 4 times daily.  · She has not quite started using the Carafate.  · Continue Prilosec and Carafate  · PET/CT 4/11/2022 shows increased uptake in the proximal esophagus.  Likely secondary to GERD.  · Continue current medication     *Port not returning blood    · A new Mediport was placed by thoracic surgery 11/29/2021, Eliquis discontinued and right chest Mediport is now safe to use.     Past Medical History:   Diagnosis Date   • Anxiety    • Clot     POSSIBLE BLOOD CLOT IN VENOUS ACCESS DEVICE-PT STATES WAS STARTED ON ELIQUIS    • Dyspnea on exertion    • Elevated cholesterol    • Frequent urination      DAY AND NIGHT   • SHAYY (generalized anxiety disorder)     RELATED HIGH BLOOD PRESSURE   • GERD (gastroesophageal reflux disease)    • High blood pressure     ANXIETY RELATED   • Hyperlipidemia    • Hypothyroidism    • Lung cancer (HCC)    • Lung nodule     LEFT   • Malignant neoplasm of upper-outer quadrant of right breast in female, estrogen receptor positive (HCC) 4/13/2021    PT STATES HAS BILAT BREAST CANCER   • Migraine    • PONV (postoperative nausea and vomiting)         Past Surgical History:   Procedure Laterality Date   • BREAST BIOPSY Bilateral 04/07/2021    Right Breast 10 o'clock & Left breast 10 o'clock 6 cm from nipple, [facility]   • CLOSED REDUCTION HIP DISLOCATION Left 4/30/2021    Procedure: BRONCHOSCOPY, LEFT VIDEO ASSITED THORACOSCOPY, ROBOTIC ASSSITED MEDIASTINAL LYMPHADENECTOMY WITH INTERCOSTAL NERVE BLOCKS;  Surgeon: Raphael Kerr III, MD;  Location: [facility];  Service: DaVinci;  Laterality: Left;   • COLONOSCOPY     • TUBAL ABDOMINAL LIGATION     • VENOUS ACCESS DEVICE (PORT) INSERTION Right 5/20/2021    Procedure: INSERTION VENOUS ACCESS DEVICE;  Surgeon: Raphael Kerr III, MD;  Location: [facility] OR;  Service: Thoracic;  Laterality: Right;   • VENOUS ACCESS DEVICE (PORT) INSERTION Right 11/29/2021    Procedure: REVISION AND REPLACEMENT RIGHT SUBCLAVIAN VENOUS ACCESS PORT;  Surgeon: Raphael Kerr III, MD;  Location: [facility];  Service: Thoracic;  Laterality: Right;   • WRIST SURGERY Right         No current facility-administered medications on file prior to encounter.     Current Outpatient Medications on File Prior to Encounter   Medication Sig Dispense Refill   • ALPRAZolam (XANAX) 0.5 MG tablet Take 1 tablet by mouth 2 (Two) Times a Day As Needed for Anxiety. 60 tablet 2   • buPROPion XL (WELLBUTRIN XL) 300 MG 24 hr tablet Take 1 tablet by mouth Daily. 30 tablet 5   • FLUoxetine (PROzac) 40 MG capsule TAKE ONE CAPSULE BY MOUTH DAILY 90 capsule 1   • folic acid  (FOLVITE) 1 MG tablet Take 1 tablet by mouth Daily. 30 tablet 3   • gabapentin (NEURONTIN) 300 MG capsule TAKE TWO CAPSULES BY MOUTH THREE TIMES A  capsule 3   • letrozole (FEMARA) 2.5 MG tablet Take 1 tablet by mouth Daily. 90 tablet 3   • levothyroxine (SYNTHROID, LEVOTHROID) 25 MCG tablet Take 1 tablet by mouth Daily. 90 tablet 1   • OLANZapine (zyPREXA) 5 MG tablet TAKE ONE TABLET BY MOUTH ONCE NIGHTLY 30 tablet 1   • omeprazole (PrilOSEC) 20 MG capsule Take 1 capsule by mouth 2 (Two) Times a Day. 180 capsule 1   • oxyCODONE-acetaminophen (Percocet) 5-325 MG per tablet Take 1 tablet by mouth Every 6 (Six) Hours As Needed for Moderate Pain . (Patient taking differently: Take 2 tablets by mouth Every 6 (Six) Hours As Needed for Moderate Pain .) 120 tablet 0   • propranolol LA (Inderal LA) 60 MG 24 hr capsule Take 1 capsule by mouth Daily. 90 capsule 1   • simvastatin (ZOCOR) 20 MG tablet Take 1 tablet by mouth Daily. 90 tablet 1   • dexamethasone (DECADRON) 4 MG tablet Take 2 tablets oral twice a day for 3 consecutive days beginning the day before chemotherapy and continue for 6 doses. 12 tablet 5   • Morphine (MS CONTIN) 15 MG 12 hr tablet Take 1 tablet by mouth 2 (Two) Times a Day for 15 days. 1 tablet 60 tablet 0   • prochlorperazine (COMPAZINE) 10 MG tablet Take 1 tablet by mouth Every 6 (Six) Hours As Needed for Nausea or Vomiting. 30 tablet 1   • rizatriptan (MAXALT) 10 MG tablet Take 1 tablet by mouth 1 (One) Time for 1 dose. AT ONSET OF HEADACHE MAY REPEAT ONE TABLET IN 2 HOURS IF NEEDED 12 tablet 5        ALLERGIES:  No Known Allergies     Social History     Socioeconomic History   • Marital status:      Spouse name: Jelani   Tobacco Use   • Smoking status: Former Smoker     Packs/day: 1.00     Years: 30.00     Pack years: 30.00     Types: Electronic Cigarette, Cigarettes     Start date: 1981     Quit date:      Years since quittin.5   • Smokeless tobacco: Never Used   • Tobacco  comment: ABT 15 CIGARETTES DAILY   Vaping Use   • Vaping Use: Some days   • Substances: Nicotine, Flavoring   • Devices: Disposable   Substance and Sexual Activity   • Alcohol use: Never   • Drug use: Never   • Sexual activity: Yes     Partners: Male        Family History   Problem Relation Age of Onset   • Cancer Father         LYMPHATIC   • Prostate cancer Father    • Lymphoma Father    • Alcohol abuse Brother    • Colon cancer Maternal Grandmother    • Malig Hyperthermia Neg Hx         Review of Systems   Constitutional: Positive for activity change, appetite change, chills, diaphoresis, fatigue, fever and unexpected weight change.   HENT: Positive for congestion.    Respiratory: Positive for cough.    Cardiovascular: Negative.    Gastrointestinal: Negative.    Genitourinary: Negative.    Musculoskeletal: Positive for arthralgias.   Skin: Positive for color change.   Neurological: Positive for weakness.   Hematological: Negative.    Psychiatric/Behavioral: Positive for confusion.        Objective     Vitals:    08/03/22 2030 08/04/22 0407 08/04/22 0741 08/04/22 1207   BP: 113/79 124/86 114/76 118/87   BP Location: Left arm Left arm Right arm Right arm   Patient Position: Lying Lying Lying Lying   Pulse: 88 79 79 78   Resp: 25 25 25 25   Temp: (!) 101.1 °F (38.4 °C) 99 °F (37.2 °C) 97.2 °F (36.2 °C) 97.4 °F (36.3 °C)   TempSrc: Oral Oral Oral Oral   SpO2: 97% 95% 95% 95%   Weight:       Height:         Current Status 7/26/2022   ECOG score 0       Physical Exam  Constitutional:       Appearance: She is obese. She is ill-appearing.   HENT:      Head: Normocephalic.   Eyes:      General: No scleral icterus.  Cardiovascular:      Rate and Rhythm: Normal rate.      Pulses: Normal pulses.      Heart sounds: Normal heart sounds.   Pulmonary:      Effort: Pulmonary effort is normal.      Breath sounds: Normal breath sounds.   Abdominal:      General: Abdomen is flat. There is no distension.      Palpations: Abdomen is  soft. There is no mass.      Tenderness: There is no abdominal tenderness. There is no right CVA tenderness, left CVA tenderness, guarding or rebound.   Musculoskeletal:      Right lower leg: No edema.      Left lower leg: No edema.   Lymphadenopathy:      Cervical: No cervical adenopathy.   Skin:     General: Skin is warm and dry.      Coloration: Skin is pale.   Neurological:      Mental Status: She is alert and oriented to person, place, and time.   Psychiatric:         Mood and Affect: Mood normal.           RECENT LABS:  Hematology WBC   Date Value Ref Range Status   08/04/2022 1.16 (C) 3.40 - 10.80 10*3/mm3 Final     RBC   Date Value Ref Range Status   08/04/2022 3.32 (L) 3.77 - 5.28 10*6/mm3 Final     Hemoglobin   Date Value Ref Range Status   08/04/2022 9.7 (L) 12.0 - 15.9 g/dL Final     Hematocrit   Date Value Ref Range Status   08/04/2022 30.7 (L) 34.0 - 46.6 % Final     Platelets   Date Value Ref Range Status   08/04/2022 183 140 - 450 10*3/mm3 Final      CT HEAD WITHOUT CONTRAST     HISTORY: Confused, unsteady gait, breast and lung cancer.     COMPARISON: MRI examination of brain 10/26/2021.     FINDINGS: The brain and ventricles are symmetrical. There is no evidence  of intracranial hemorrhage, hydrocephalus or of abnormal extra-axial  fluid. No focal area of decreased attenuation to suggest acute  infarction or vasogenic edema is identified. There is no evidence of a  focal lytic or blastic lesion to suggest calvarial metastatic disease.  The PET performed on 07/18/2022 showed no evidence of metastatic disease  involving the skull base. The PET examination did demonstrate a lytic  lesion involving the lamina of C2 on the right posteriorly and extending  to the anterior aspect of the spinous process. It measured approximately  17 mm in maximum transverse dimension and is new versus the PET  examination of 04/11/2022.     IMPRESSION:  1.  No evidence of acute infarction or of intracranial hemorrhage.  No  obvious metastatic disease is identified intracranially on this  noncontrasted CT examination of the brain. Further evaluation could be  performed with a MRI examination of the brain with and without contrast.  2.  The PET examination 07/18/2022 demonstrated a lytic lesion involving  the lamina of C2 on the right posteriorly extending to the anterior  aspect of the spinous process measuring 17 mm in size. This is new  versus 04/11/2022. Further evaluation with a MRI examination of the  cervical spine with and without contrast is recommended.     The above information was called to and discussed with Conor Kelly.           Radiation dose reduction techniques were utilized, including automated  exposure control and exposure modulation based on body size.     This report was finalized on 8/4/2022 7:30 AM by Dr. Brenton Parham M.D.    Assessment & Plan      In summary this 57-year-old white female has been admitted to the hospital with neutropenic sepsis. Blood cultures are negative. There is no obvious source of infection. The patient was placed on Neupogen yesterday by me. Her white count is starting to improve and actually the patient feels substantially better today. She has minimal if any headache. No sores in her mouth. No bone pain. She was mildly hungry this morning. She had some toast and egg. She has not had any further diarrhea. No abdominal pain. I do believe that the patient has neutropenic fever. The source of infection as usual is very difficult to find if any but the patients get better on antibiotics. I will propose to continue her Neupogen injection today and for the next day to keep helping her white count to return and allow her to improve her performance status, go up to walking and moving around and be able to discharge home in the next 2 or 3 days.     The patient also has anemia associated with chemotherapy treatment. Right now she has no need for blood products.     The patient also has a  normal platelet count; fortunately she will not require any platelet products given this number.     The radiological assessment failed to document any source of infection and even an MRI of the brain failed to document any abnormality.     The other issues discussed by Dr. Snell in her note are relatively quiet at this time including her anxiety and depression. Blood pressure is under good control and her reflux symptomatology is quiet. She is not eating too much. Her port does not look infected.     RECOMMENDATIONS:  As posted, continuation of Neupogen for the time being, continuation of antibiotics, and general supportive care. Hopefully in a couple more days she will recover to be able to go home.    DURING THE VISIT WITH THE PATIENT TODAY , PATIENT HAD FACE MASK, I HAD PROPER PROTECTIVE EQUIPMENT, AND I DID HAND HYGIENE WITH SOAP AND WATER BEFORE AND AFTER THE VISIT.

## 2022-08-04 NOTE — PROGRESS NOTES
Name: Holli Patel ADMIT: 8/3/2022   : 1965  PCP: Vandana Gastelum MD    MRN: [id number] LOS: 0 days   AGE/SEX: 57 y.o. female  ROOM: [facility]     Subjective   Subjective   Feels somewhat better than yesterday.  Was able to walk with assistance to the stretcher to go to MRI.  Was able to get MRI scans performed.  Fever last night.  No other new symptoms.    Review of Systems     Objective   Objective   Vital Signs  Temp:  [97.2 °F (36.2 °C)-101.9 °F (38.8 °C)] 97.4 °F (36.3 °C)  Heart Rate:  [] 78  Resp:  [18-25] 25  BP: (113-136)/(76-87) 118/87  SpO2:  [95 %-98 %] 95 %  on   ;   Device (Oxygen Therapy): room air  Body mass index is 30.03 kg/m².  Physical Exam  Vitals and nursing note reviewed.   Constitutional:       General: She is not in acute distress.     Appearance: Normal appearance.   Neck:      Vascular: No JVD.      Trachea: No tracheal deviation.   Cardiovascular:      Rate and Rhythm: Normal rate and regular rhythm.      Heart sounds: Normal heart sounds.   Pulmonary:      Effort: Pulmonary effort is normal. No respiratory distress.      Breath sounds: Normal breath sounds.   Abdominal:      General: Bowel sounds are normal.      Palpations: Abdomen is soft.      Tenderness: There is no abdominal tenderness.   Skin:     General: Skin is warm and dry.   Neurological:      General: No focal deficit present.      Mental Status: She is alert and oriented to person, place, and time.   Psychiatric:         Behavior: Behavior normal. Behavior is cooperative.         Results Review     I reviewed the patient's new clinical results.  Results from last 7 days   Lab Units 22  0356 22  0806 22  1125   WBC 10*3/mm3 1.16* 0.68* 0.51*   HEMOGLOBIN g/dL 9.7* 9.6* 10.4*   PLATELETS 10*3/mm3 183 169 193     Results from last 7 days   Lab Units 22  0356 22  0806 22  1128 22  1120   SODIUM mmol/L 133* 133* 137 137   POTASSIUM mmol/L 3.9 4.5 4.5 4.5   CHLORIDE  mmol/L 104 104 107 107   CO2 mmol/L 17.6* 19.0* 18.2* 18.7*   BUN mg/dL 12 11 15 15   CREATININE mg/dL 1.04* 1.12* 1.33* 1.32*   GLUCOSE mg/dL 108* 101* 123 123   EGFR mL/min/1.73 62.0 56.8* 46.2* 46.6*     Results from last 7 days   Lab Units 08/04/22  0356 08/03/22  0806 08/02/22  1128 08/02/22  1120   ALBUMIN g/dL 3.60 3.70 3.80 3.90  2.9   BILIRUBIN mg/dL 0.4 0.4 0.2  --    ALK PHOS U/L 129* 119* 128*  --    AST (SGOT) U/L 26 16 21  --    ALT (SGPT) U/L 28 15 20  --      Results from last 7 days   Lab Units 08/04/22  0356 08/03/22  0806 08/02/22  1128 08/02/22  1120   CALCIUM mg/dL 7.5* 7.5* 7.8* 7.8*   ALBUMIN g/dL 3.60 3.70 3.80 3.90  2.9   MAGNESIUM mg/dL  --   --   --  1.8   PHOSPHORUS mg/dL  --   --   --  1.2*     Results from last 7 days   Lab Units 08/03/22  0806   PROCALCITONIN ng/mL 0.22   LACTATE mmol/L 0.9     Hemoglobin A1C   Date/Time Value Ref Range Status   08/04/2022 0356 5.20 4.80 - 5.60 % Final     Glucose   Date/Time Value Ref Range Status   08/04/2022 0553 109 70 - 130 mg/dL Final     Comment:     Meter: [id number] : [id number] Rdz Aldastity NA   08/03/2022 2342 104 70 - 130 mg/dL Final     Comment:     Meter: [id number] : [id number] Rdz Chastity NA   08/03/2022 1747 204 (H) 70 - 130 mg/dL Final     Comment:     Meter: [id number] : [id number] Kayla EDMONDSON   08/03/2022 1746 213 (H) 70 - 130 mg/dL Final     Comment:     Meter: [id number] : [id number] Pendroy Nata EDMONDSON       Scheduled Medications  aspirin, 325 mg, Oral, Daily   Or  aspirin, 300 mg, Rectal, Daily  atorvastatin, 80 mg, Oral, Nightly  buPROPion XL, 150 mg, Oral, Daily  cefepime, 2 g, Intravenous, Q12H  filgrastim (NEUPOGEN) injection, 300 mcg, Subcutaneous, Q PM  FLUoxetine, 40 mg, Oral, Daily  folic acid, 1 mg, Oral, Daily  gabapentin, 600 mg, Oral, Q8H  letrozole, 2.5 mg, Oral, Daily  levothyroxine, 25 mcg, Oral, Daily  OLANZapine, 5 mg, Oral, Nightly  pantoprazole, 40 mg, Oral, QAM  propranolol LA, 60  mg, Oral, Daily  sodium chloride, 10 mL, Intravenous, Q12H    Infusions  sodium chloride, 75 mL/hr, Last Rate: 75 mL/hr (08/04/22 1128)    Diet  Diet Regular       Assessment/Plan     Active Hospital Problems    Diagnosis  POA   • **Ataxia [R27.0]  Yes   • Confusion [R41.0]  Yes   • Chemotherapy-induced neutropenia (HCC) [D70.1, T45.1X5A]  Yes   • Metabolic acidosis [E87.2]  Yes   • Bony metastasis (HCC) [C79.51]  Yes   • Stage 3a chronic kidney disease (HCC) [N18.31]  Yes   • History of bilateral breast cancer [Z85.3]  Not Applicable   • Primary adenocarcinoma of upper lobe of left lung (HCC) [C34.12]  Yes   • Essential hypertension [I10]  Yes      Resolved Hospital Problems   No resolved problems to display.       57 y.o. female admitted with Ataxia.    Appreciate assistance from ID.  On empiric cefepime.  Follow-up cultures.  Still febrile.    Not no acute infarct seen on MRI brain.  Will DC aspirin and resume home dose statin.  Await neurology follow-up recommendation.  Discussed with radiology regarding concern for pathologic fracture at C2 at area of previous metastatic deposit.  Per radiologist recommendation will get noncontrasted CT scan cervical spine.    Patient had outpatient renal ultrasound ordered by her nephrologist.  Was referred to nephrology recently due to elevation in creatinine thought related to chemotherapy.  Renal function has returned to baseline.  No indication for renal ultrasound at present.      · SCDs for DVT prophylaxis.  · Full code.  · Discussed with patient and family.  · Anticipate discharge home with family timing yet to be determined.      Toby Jade MD  [facility]  08/04/22  13:08 EDT

## 2022-08-04 NOTE — NURSING NOTE
Pt had orders for MRI to rule out stroke at 1300. At shift change pt had yet to go to MRI, RN contacted MRI tech, who stated Pt would not be going to MRI today due to increased cases from ED. RN notified [facility] provider on call.

## 2022-08-04 NOTE — PLAN OF CARE
Goal Outcome Evaluation:  Plan of Care Reviewed With: patient           Outcome Evaluation: Orders received per stroke protocol.  MRI negative for acute stroke.  Passed nursing swallow screen and has been tolerating current diet per staff, pt/family.  Pt/family deny changes in speech, thinking, or swallowing.  Decline need for evaluation.  Will s/o.  If difficulties noted, please reconsult.

## 2022-08-04 NOTE — PROGRESS NOTES
[facility] Acute Rehab Note     Received referral via stroke order set.  MRI negative for stroke.  Will go ahead and sign off at this time.  If felt patient appropriate for acute inpatient rehab once dx made and therapies begin, please call our office at [phone] to initiate a full referral.    Thanks,  Teri Lomas RN  Rehab Admission Nurse

## 2022-08-04 NOTE — CASE MANAGEMENT/SOCIAL WORK
Discharge Planning Assessment  [facility]     Patient Name: Holli Patel  MRN: [id number]  Today's Date: 8/4/2022    Admit Date: 8/3/2022     Discharge Needs Assessment     Row Name 08/04/22 1522       Living Environment    People in Home parent(s);spouse    Current Living Arrangements home    Primary Care Provided by self    Provides Primary Care For parent(s)    Family Caregiver if Needed spouse    Able to Return to Prior Arrangements yes       Resource/Environmental Concerns    Resource/Environmental Concerns home accessibility    Home Accessibility Concerns stairs to enter home;stairs to access bedroom or bathroom       Transition Planning    Patient/Family Anticipates Transition to home with family    Patient/Family Anticipated Services at Transition none    Transportation Anticipated family or friend will provide       Discharge Needs Assessment    Equipment Currently Used at Home none    Concerns to be Addressed discharge planning    Equipment Needed After Discharge none               Discharge Plan     Row Name 08/04/22 1523       Plan    Plan Home, family to transport    Patient/Family in Agreement with Plan yes    Plan Comments CSW spoke to patient at bedside; explained role, verified facesheet, and discussed dc plan. Plan is home, family to transport. Patient uses no DME and is independent for mobility. She lives with / takes care of her mom in a 3 level home with 6 steps to get inside. Patient states her mom has dementia and is being cared for by her sister at this time. Patient has no history of HH or SNF. Patient denies any DME, HH, or SNF needs. CCP to follow for any IV abx needs - patient is currentl yon cefepime 2g IV every 12 hrs. BRANDT, CSW              Continued Care and Services - Admitted Since 8/3/2022    Coordination has not been started for this encounter.       Expected Discharge Date and Time     Expected Discharge Date Expected Discharge Time    Aug 5, 2022          Demographic  Summary     Row Name 08/04/22 1522       General Information    Admission Type inpatient    Arrived From home    Referral Source admission list    Reason for Consult discharge planning    Preferred Language English       Contact Information    Permission Granted to Share Info With family/designee               Functional Status     Row Name 08/04/22 1522       Functional Status    Usual Activity Tolerance good    Current Activity Tolerance good       Functional Status, IADL    Medications independent    Meal Preparation independent    Housekeeping independent    Laundry independent    Shopping independent       Mental Status    General Appearance WDL WDL               Psychosocial    No documentation.                Abuse/Neglect    No documentation.                Legal    No documentation.                Substance Abuse    No documentation.                Patient Forms    No documentation.                   LOUIS ALONZO

## 2022-08-05 ENCOUNTER — APPOINTMENT (OUTPATIENT)
Dept: CT IMAGING | Facility: HOSPITAL | Age: 57
End: 2022-08-05

## 2022-08-05 ENCOUNTER — APPOINTMENT (OUTPATIENT)
Dept: ULTRASOUND IMAGING | Facility: HOSPITAL | Age: 57
End: 2022-08-05

## 2022-08-05 ENCOUNTER — HOME HEALTH ADMISSION (OUTPATIENT)
Dept: HOME HEALTH SERVICES | Facility: HOME HEALTHCARE | Age: 57
End: 2022-08-05

## 2022-08-05 ENCOUNTER — TELEPHONE (OUTPATIENT)
Dept: FAMILY MEDICINE CLINIC | Facility: CLINIC | Age: 57
End: 2022-08-05

## 2022-08-05 LAB
ANION GAP SERPL CALCULATED.3IONS-SCNC: 13 MMOL/L (ref 5–15)
BASOPHILS # BLD MANUAL: 0.03 10*3/MM3 (ref 0–0.2)
BASOPHILS NFR BLD MANUAL: 1.1 % (ref 0–1.5)
BUN SERPL-MCNC: 11 MG/DL (ref 6–20)
BUN/CREAT SERPL: 9.3 (ref 7–25)
CALCIUM SPEC-SCNC: 7.1 MG/DL (ref 8.6–10.5)
CANCER AG15-3 SERPL-ACNC: 11.5 U/ML
CEA SERPL-MCNC: 2644 NG/ML
CHLORIDE SERPL-SCNC: 110 MMOL/L (ref 98–107)
CHLORIDE UR-SCNC: 88 MMOL/L
CO2 SERPL-SCNC: 17 MMOL/L (ref 22–29)
CREAT SERPL-MCNC: 1.18 MG/DL (ref 0.57–1)
CREAT UR-MCNC: 63 MG/DL
DEPRECATED RDW RBC AUTO: 38.4 FL (ref 37–54)
DEPRECATED RDW RBC AUTO: 40 FL (ref 37–54)
EGFRCR SERPLBLD CKD-EPI 2021: 53.3 ML/MIN/1.73
EOSINOPHIL # BLD MANUAL: 0.03 10*3/MM3 (ref 0–0.4)
EOSINOPHIL NFR BLD MANUAL: 1.1 % (ref 0.3–6.2)
ERYTHROCYTE [DISTWIDTH] IN BLOOD BY AUTOMATED COUNT: 11.9 % (ref 12.3–15.4)
ERYTHROCYTE [DISTWIDTH] IN BLOOD BY AUTOMATED COUNT: 12 % (ref 12.3–15.4)
GLUCOSE SERPL-MCNC: 78 MG/DL (ref 65–99)
HCT VFR BLD AUTO: 28.5 % (ref 34–46.6)
HCT VFR BLD AUTO: 28.6 % (ref 34–46.6)
HGB BLD-MCNC: 9.2 G/DL (ref 12–15.9)
HGB BLD-MCNC: 9.3 G/DL (ref 12–15.9)
LYMPHOCYTES # BLD MANUAL: 0.56 10*3/MM3 (ref 0.7–3.1)
LYMPHOCYTES NFR BLD MANUAL: 33.7 % (ref 5–12)
MCH RBC QN AUTO: 29 PG (ref 26.6–33)
MCH RBC QN AUTO: 29.9 PG (ref 26.6–33)
MCHC RBC AUTO-ENTMCNC: 32.3 G/DL (ref 31.5–35.7)
MCHC RBC AUTO-ENTMCNC: 32.5 G/DL (ref 31.5–35.7)
MCV RBC AUTO: 89.9 FL (ref 79–97)
MCV RBC AUTO: 92 FL (ref 79–97)
MONOCYTES # BLD: 0.92 10*3/MM3 (ref 0.1–0.9)
MYELOCYTES NFR BLD MANUAL: 1.1 % (ref 0–0)
NEUTROPHILS # BLD AUTO: 1.15 10*3/MM3 (ref 1.7–7)
NEUTROPHILS NFR BLD MANUAL: 42.4 % (ref 42.7–76)
NRBC BLD AUTO-RTO: 1.5 /100 WBC (ref 0–0.2)
NRBC SPEC MANUAL: 3.3 /100 WBC (ref 0–0.2)
PLAT MORPH BLD: NORMAL
PLATELET # BLD AUTO: 180 10*3/MM3 (ref 140–450)
PLATELET # BLD AUTO: 187 10*3/MM3 (ref 140–450)
PMV BLD AUTO: 10.2 FL (ref 6–12)
PMV BLD AUTO: 10.5 FL (ref 6–12)
POTASSIUM SERPL-SCNC: 3.5 MMOL/L (ref 3.5–5.2)
PROT ?TM UR-MCNC: 98.6 MG/DL
PROT/CREAT UR: 1565.1 MG/G CREA (ref 0–200)
PTH-INTACT SERPL-MCNC: 365 PG/ML (ref 15–65)
RBC # BLD AUTO: 3.11 10*6/MM3 (ref 3.77–5.28)
RBC # BLD AUTO: 3.17 10*6/MM3 (ref 3.77–5.28)
RBC MORPH BLD: NORMAL
SODIUM SERPL-SCNC: 140 MMOL/L (ref 136–145)
SODIUM UR-SCNC: 80 MMOL/L
VARIANT LYMPHS NFR BLD MANUAL: 20.7 % (ref 19.6–45.3)
WBC MORPH BLD: NORMAL
WBC NRBC COR # BLD: 2.64 10*3/MM3 (ref 3.4–10.8)
WBC NRBC COR # BLD: 2.72 10*3/MM3 (ref 3.4–10.8)

## 2022-08-05 PROCEDURE — 25010000002 CEFEPIME PER 500 MG: Performed by: INTERNAL MEDICINE

## 2022-08-05 PROCEDURE — 99232 SBSQ HOSP IP/OBS MODERATE 35: CPT | Performed by: INTERNAL MEDICINE

## 2022-08-05 PROCEDURE — 83970 ASSAY OF PARATHORMONE: CPT | Performed by: HOSPITALIST

## 2022-08-05 PROCEDURE — 76775 US EXAM ABDO BACK WALL LIM: CPT

## 2022-08-05 PROCEDURE — 85025 COMPLETE CBC W/AUTO DIFF WBC: CPT | Performed by: INTERNAL MEDICINE

## 2022-08-05 PROCEDURE — 86300 IMMUNOASSAY TUMOR CA 15-3: CPT | Performed by: INTERNAL MEDICINE

## 2022-08-05 PROCEDURE — 99233 SBSQ HOSP IP/OBS HIGH 50: CPT | Performed by: INTERNAL MEDICINE

## 2022-08-05 PROCEDURE — 82570 ASSAY OF URINE CREATININE: CPT | Performed by: INTERNAL MEDICINE

## 2022-08-05 PROCEDURE — 84156 ASSAY OF PROTEIN URINE: CPT | Performed by: INTERNAL MEDICINE

## 2022-08-05 PROCEDURE — 99253 IP/OBS CNSLTJ NEW/EST LOW 45: CPT | Performed by: RADIOLOGY

## 2022-08-05 PROCEDURE — 72125 CT NECK SPINE W/O DYE: CPT

## 2022-08-05 PROCEDURE — 99231 SBSQ HOSP IP/OBS SF/LOW 25: CPT

## 2022-08-05 PROCEDURE — 80048 BASIC METABOLIC PNL TOTAL CA: CPT | Performed by: HOSPITALIST

## 2022-08-05 PROCEDURE — 25010000002 FILGRASTIM 300 MCG/0.5ML SOLUTION PREFILLED SYRINGE: Performed by: INTERNAL MEDICINE

## 2022-08-05 PROCEDURE — 85027 COMPLETE CBC AUTOMATED: CPT | Performed by: HOSPITALIST

## 2022-08-05 PROCEDURE — 82436 ASSAY OF URINE CHLORIDE: CPT | Performed by: INTERNAL MEDICINE

## 2022-08-05 PROCEDURE — 85007 BL SMEAR W/DIFF WBC COUNT: CPT | Performed by: INTERNAL MEDICINE

## 2022-08-05 PROCEDURE — 84300 ASSAY OF URINE SODIUM: CPT | Performed by: INTERNAL MEDICINE

## 2022-08-05 RX ORDER — SODIUM BICARBONATE 650 MG/1
1300 TABLET ORAL 3 TIMES DAILY
Status: DISCONTINUED | OUTPATIENT
Start: 2022-08-05 | End: 2022-08-09

## 2022-08-05 RX ADMIN — CEFEPIME 2 G: 2 INJECTION, POWDER, FOR SOLUTION INTRAVENOUS at 04:34

## 2022-08-05 RX ADMIN — PROPRANOLOL HYDROCHLORIDE 60 MG: 60 CAPSULE, EXTENDED RELEASE ORAL at 09:34

## 2022-08-05 RX ADMIN — LETROZOLE 2.5 MG: 2.5 TABLET ORAL at 09:35

## 2022-08-05 RX ADMIN — GABAPENTIN 600 MG: 300 CAPSULE ORAL at 22:11

## 2022-08-05 RX ADMIN — OLANZAPINE 5 MG: 5 TABLET ORAL at 22:11

## 2022-08-05 RX ADMIN — FILGRASTIM 300 MCG: 300 INJECTION, SOLUTION INTRAVENOUS; SUBCUTANEOUS at 17:32

## 2022-08-05 RX ADMIN — FLUOXETINE HYDROCHLORIDE 40 MG: 20 CAPSULE ORAL at 09:34

## 2022-08-05 RX ADMIN — OXYCODONE AND ACETAMINOPHEN 1 TABLET: 5; 325 TABLET ORAL at 17:38

## 2022-08-05 RX ADMIN — BUPROPION HYDROCHLORIDE 150 MG: 150 TABLET, EXTENDED RELEASE ORAL at 09:35

## 2022-08-05 RX ADMIN — LEVOTHYROXINE SODIUM 25 MCG: 0.03 TABLET ORAL at 05:38

## 2022-08-05 RX ADMIN — Medication 10 ML: at 22:12

## 2022-08-05 RX ADMIN — Medication 1 MG: at 09:35

## 2022-08-05 RX ADMIN — GABAPENTIN 600 MG: 300 CAPSULE ORAL at 15:14

## 2022-08-05 RX ADMIN — SODIUM BICARBONATE 1300 MG: 650 TABLET ORAL at 17:32

## 2022-08-05 RX ADMIN — Medication 10 ML: at 09:47

## 2022-08-05 RX ADMIN — ATORVASTATIN CALCIUM 10 MG: 20 TABLET, FILM COATED ORAL at 22:12

## 2022-08-05 RX ADMIN — PANTOPRAZOLE SODIUM 40 MG: 40 TABLET, DELAYED RELEASE ORAL at 05:39

## 2022-08-05 RX ADMIN — ALPRAZOLAM 0.5 MG: 0.5 TABLET ORAL at 22:12

## 2022-08-05 RX ADMIN — GABAPENTIN 600 MG: 300 CAPSULE ORAL at 05:38

## 2022-08-05 RX ADMIN — SODIUM BICARBONATE 1300 MG: 650 TABLET ORAL at 22:11

## 2022-08-05 RX ADMIN — CEFEPIME 2 G: 2 INJECTION, POWDER, FOR SOLUTION INTRAVENOUS at 15:14

## 2022-08-05 NOTE — PROGRESS NOTES
Spoke to patient about home health PT and she feels at this time outpatient PT is preferred and declined home health . CCP informed .Thank you !

## 2022-08-05 NOTE — CONSULTS
[facility] Consult Note      Patient Name: Holli Patel  : 1965  MRN: [id number]  Primary Care Physician:  Vandana Gastelum MD  Referring Physician: Toby Jade MD  Date of admission: 8/3/2022    Subjective     Reason for Consult: Chronic kidney disease and metabolic acidosis    HPI:   Holli Patel is a 57 y.o. female the patient was admitted on 8/3/2022 with low back pain inability to walk was found to have C-spine and L-spine abnormality with a compression fracture also metastatic bony lesion.  Noted to have creatinine on admission being 1.12 prior to that her creatinine was as high as 2.01 in late May 2022.  She was seen in our office on 2022 for evaluation for her renal dysfunction.    Has history of breast cancer diagnosed in , history of metastatic adenocarcinoma of the lung to the bone and left adrenal gland she was initiated on cisplatin and and Ultima on 2021 in addition to radiotherapy, on 2021 patient was switched to Ultima and Keytruda the patient was noted to have worsening kidney function at that time her lisinopril was discontinued and creatinine went up to around 2 in late May 2022 then went down to 1.7 on 2022 and on admission is much improved.  Noted to have metabolic acidosis and hypocalcemia.  Medical history includes anxiety, GERD, dyslipidemia, hypertension, hypothyroidism and she had Mediport on the right.  She stated that her last chemotherapy was about 5 weeks ago and currently she is on only on letrozole tablets daily    She is complaining of lower back pain, inability to ambulate, with significant weakness of lower extremity, she had dyspnea with exertion, no orthopnea or PND, no nausea or vomiting, no abdominal pain, no dysuria or gross hematuria.  Review of Systems:   14 point review of systems is otherwise negative except for mentioned above on HPI    Personal History     Past Medical History:   Diagnosis Date   •  Anxiety    • Clot     POSSIBLE BLOOD CLOT IN VENOUS ACCESS DEVICE-PT STATES WAS STARTED ON ELIQUIS    • Dyspnea on exertion    • Elevated cholesterol    • Frequent urination     DAY AND NIGHT   • SHAYY (generalized anxiety disorder)     RELATED HIGH BLOOD PRESSURE   • GERD (gastroesophageal reflux disease)    • High blood pressure     ANXIETY RELATED   • Hyperlipidemia    • Hypothyroidism    • Lung cancer (HCC)    • Lung nodule     LEFT   • Malignant neoplasm of upper-outer quadrant of right breast in female, estrogen receptor positive (HCC) 4/13/2021    PT STATES HAS BILAT BREAST CANCER   • Migraine    • PONV (postoperative nausea and vomiting)        Past Surgical History:   Procedure Laterality Date   • BREAST BIOPSY Bilateral 04/07/2021    Right Breast 10 o'clock & Left breast 10 o'clock 6 cm from nipple, [facility]   • CLOSED REDUCTION HIP DISLOCATION Left 4/30/2021    Procedure: BRONCHOSCOPY, LEFT VIDEO ASSITED THORACOSCOPY, ROBOTIC ASSSITED MEDIASTINAL LYMPHADENECTOMY WITH INTERCOSTAL NERVE BLOCKS;  Surgeon: Raphael Kerr III, MD;  Location: [facility];  Service: DaVinci;  Laterality: Left;   • COLONOSCOPY     • TUBAL ABDOMINAL LIGATION     • VENOUS ACCESS DEVICE (PORT) INSERTION Right 5/20/2021    Procedure: INSERTION VENOUS ACCESS DEVICE;  Surgeon: Raphael Kerr III, MD;  Location: [facility] OR;  Service: Thoracic;  Laterality: Right;   • VENOUS ACCESS DEVICE (PORT) INSERTION Right 11/29/2021    Procedure: REVISION AND REPLACEMENT RIGHT SUBCLAVIAN VENOUS ACCESS PORT;  Surgeon: Raphael Kerr III, MD;  Location: [facility] OR;  Service: Thoracic;  Laterality: Right;   • WRIST SURGERY Right        Family History: family history includes Alcohol abuse in her brother; Cancer in her father; Colon cancer in her maternal grandmother; Lymphoma in her father; Prostate cancer in her father.    Social History:  reports that she quit smoking about 19 months ago. Her smoking use included electronic cigarette  and cigarettes. She started smoking about 41 years ago. She has a 30.00 pack-year smoking history. She has never used smokeless tobacco. She reports that she does not drink alcohol and does not use drugs.    Home Medications:  Prior to Admission medications    Medication Sig Start Date End Date Taking? Authorizing Provider   ALPRAZolam (XANAX) 0.5 MG tablet Take 1 tablet by mouth 2 (Two) Times a Day As Needed for Anxiety. 5/25/22  Yes Vandana Gastelum MD   buPROPion XL (WELLBUTRIN XL) 300 MG 24 hr tablet Take 1 tablet by mouth Daily. 5/25/22  Yes Vandana Gastelum MD   FLUoxetine (PROzac) 40 MG capsule TAKE ONE CAPSULE BY MOUTH DAILY 7/15/22  Yes Vandana Gastelum MD   folic acid (FOLVITE) 1 MG tablet Take 1 tablet by mouth Daily. 4/4/22  Yes Salma Snell MD   gabapentin (NEURONTIN) 300 MG capsule TAKE TWO CAPSULES BY MOUTH THREE TIMES A DAY 4/12/22  Yes Salma Snell MD   letrozole (FEMARA) 2.5 MG tablet Take 1 tablet by mouth Daily. 10/29/21  Yes Salma Snell MD   levothyroxine (SYNTHROID, LEVOTHROID) 25 MCG tablet Take 1 tablet by mouth Daily. 5/25/22  Yes Vandana Gastelum MD   OLANZapine (zyPREXA) 5 MG tablet TAKE ONE TABLET BY MOUTH ONCE NIGHTLY 7/18/22  Yes Salma Snell MD   omeprazole (PrilOSEC) 20 MG capsule Take 1 capsule by mouth 2 (Two) Times a Day. 5/25/22  Yes Vandana Gastelum MD   oxyCODONE-acetaminophen (Percocet) 5-325 MG per tablet Take 1 tablet by mouth Every 6 (Six) Hours As Needed for Moderate Pain .  Patient taking differently: Take 2 tablets by mouth Every 6 (Six) Hours As Needed for Moderate Pain . 7/18/22  Yes Salma Snell MD   propranolol LA (Inderal LA) 60 MG 24 hr capsule Take 1 capsule by mouth Daily. 5/25/22  Yes Vandana Gastelum MD   simvastatin (ZOCOR) 20 MG tablet Take 1 tablet by mouth Daily. 5/25/22  Yes Vandana Gastelum MD   dexamethasone (DECADRON) 4 MG tablet Take 2 tablets oral twice a day for 3 consecutive days beginning the day before chemotherapy  and continue for 6 doses. 7/19/22   Salma Snell MD   Morphine (MS CONTIN) 15 MG 12 hr tablet Take 1 tablet by mouth 2 (Two) Times a Day for 15 days. 1 tablet 8/2/22 8/17/22  Salma Snell MD   prochlorperazine (COMPAZINE) 10 MG tablet Take 1 tablet by mouth Every 6 (Six) Hours As Needed for Nausea or Vomiting. 7/26/22   Salma Snell MD   rizatriptan (MAXALT) 10 MG tablet Take 1 tablet by mouth 1 (One) Time for 1 dose. AT ONSET OF HEADACHE MAY REPEAT ONE TABLET IN 2 HOURS IF NEEDED 5/25/22 5/25/22  Vandana Gastelum MD       Allergies:  No Known Allergies    Objective     Vitals:   Temp:  [97.5 °F (36.4 °C)-97.8 °F (36.6 °C)] 97.6 °F (36.4 °C)  Heart Rate:  [74-86] 82  Resp:  [18-25] 18  BP: (103-120)/(72-88) 103/72  No intake or output data in the 24 hours ending 08/05/22 1535    Physical Exam:   Constitutional: Awake, alert, no acute distress.  Obese, chronically  HEENT: Sclera anicteric, no conjunctival injection, oral mucosa is normal  Neck: Supple, no thyromegaly, no lymphadenopathy, trachea at midline, no JVD  Respiratory: Clear to auscultation bilaterally, nonlabored respiration  Cardiovascular: RRR, no murmurs, no rubs or gallops, no carotid bruit  Gastrointestinal: Positive bowel sounds, abdomen is soft, nontender and nondistended  : No palpable bladder  Musculoskeletal: No edema, no clubbing or cyanosis  Psychiatric: Appropriate affect, cooperative  Neurologic: Oriented x3, moving all extremities, normal speech and mental status  Skin: Warm and dry       Scheduled Meds:     atorvastatin, 10 mg, Oral, Nightly  buPROPion XL, 150 mg, Oral, Daily  cefepime, 2 g, Intravenous, Q12H  filgrastim (NEUPOGEN) injection, 300 mcg, Subcutaneous, Q PM  FLUoxetine, 40 mg, Oral, Daily  folic acid, 1 mg, Oral, Daily  gabapentin, 600 mg, Oral, Q8H  letrozole, 2.5 mg, Oral, Daily  levothyroxine, 25 mcg, Oral, Daily  OLANZapine, 5 mg, Oral, Nightly  pantoprazole, 40 mg, Oral, QAM  propranolol LA, 60 mg, Oral,  Daily  sodium chloride, 10 mL, Intravenous, Q12H      IV Meds:        Results Reviewed:   I have personally reviewed the results from the time of this admission to 8/5/2022 15:35 EDT     Lab Results   Component Value Date    GLUCOSE 78 08/05/2022    CALCIUM 7.1 (L) 08/05/2022     08/05/2022    K 3.5 08/05/2022    CO2 17.0 (L) 08/05/2022     (H) 08/05/2022    BUN 11 08/05/2022    CREATININE 1.18 (H) 08/05/2022    EGFRIFAFRI 95 11/24/2020    EGFRIFNONA 46 (L) 02/14/2022    BCR 9.3 08/05/2022    ANIONGAP 13.0 08/05/2022      Lab Results   Component Value Date    MG 1.8 08/02/2022    PHOS 1.2 (L) 08/02/2022    ALBUMIN 3.60 08/04/2022           Assessment / Plan     ASSESSMENT:  1.  Acute kidney injury on possible chronic kidney disease stage II/III her creatinine was baseline between 1 and 1.2 increased to 2.05 mg/dL etiology of worsening kidney function is multifactorial secondary to cisplatin use, Alimta, NSAID use. Keytruda has been associated with interstitial nephritis nephritis.  Creatinine trended down after holding Alimta , lisinopril and Lasix.  Creatinine today 1.18, sodium and potassium within normal range except potassium being on the low side 3.5, she has normal anion gap metabolic acidosis and hypocalcemia  2.  Leukopenia  3.  Metastatic lung cancer to the bone and adrenal gland  4.  Breast cancer on letrozole  5.  Hypertension, reasonably controlled  6.  DVT on Eliquis  7.  Hypothyroidism on replacement therapy  8.  Anemia, hemoglobin 9.3.      PLAN:  -Check random urine for sodium, chloride and protein to creatinine  -Get venous blood gases  -Start oral sodium bicarbonate 1300 mg 3 times daily  -Check magnesium level and treat if needed  -Check PTH and vitamin D  - surveillance labs    Thank you for involving us in the care of Holli GENAO Marissajanice.  Please feel free to call with any questions.    Keith Quintero MD  08/05/22  15:35 EDT    [facility]  [phone]      Much of this encounter note is an electronic transcription/translation of spoken language to printed text. The electronic translation of spoken language may permit erroneous, or at times, nonsensical words or phrases to be inadvertently transcribed; Although I have reviewed the note for such errors, some may still exist.   Implemented All Universal Safety Interventions:  [place] to call system. Call bell, personal items and telephone within reach. Instruct patient to call for assistance. Room bathroom lighting operational. Non-slip footwear when patient is off stretcher. Physically safe environment: no spills, clutter or unnecessary equipment. Stretcher in lowest position, wheels locked, appropriate side rails in place.

## 2022-08-05 NOTE — PROGRESS NOTES
[facility] NEUROSURGERY PROGRESS NOTE      CC: Abnormal C-spine MRI, T7 compression fracture, T12 and L1 metastatic lesions      Subjective     Interval History: Patient reports doing better.  Feels as though her symptoms have improved today but she is still having difficulty walking and low back pain with radiation into the right leg.  Denies mid back pain.  Denies incontinence of bowel/bladder and saddle anesthesia.  Patient is refusing to wear a hard cervical collar.  Expressed understanding of the risks of foregoing cervical collar in the event that she does have a cervical fracture.  Denies neck pain as well as radicular symptoms in bilateral arms.    ROS:  MS: + back pain  Neuro: No numbness, tingling, or weakness, + gait difficulty  : no incontinence    Objective     Vital signs in last 24 hours:  Temp:  [97.4 °F (36.3 °C)-97.8 °F (36.6 °C)] 97.5 °F (36.4 °C)  Heart Rate:  [74-86] 86  Resp:  [18-25] 18  BP: (105-120)/(79-88) 109/79    Intake/Output this shift:  No intake/output data recorded.    LABS:  Results from last 7 days   Lab Units 08/05/22  0443 08/04/22  0356 08/03/22  0806   WBC 10*3/mm3 2.64* 1.16* 0.68*   HEMOGLOBIN g/dL 9.2* 9.7* 9.6*   HEMATOCRIT % 28.5* 30.7* 30.7*   PLATELETS 10*3/mm3 180 183 169   MONOCYTES % %  --   --  58.0*     Results from last 7 days   Lab Units 08/05/22  0443 08/04/22  0356 08/03/22  0806 08/02/22  1128   SODIUM mmol/L 140 133* 133* 137   POTASSIUM mmol/L 3.5 3.9 4.5 4.5   CHLORIDE mmol/L 110* 104 104 107   CO2 mmol/L 17.0* 17.6* 19.0* 18.2*   BUN mg/dL 11 12 11 15   CREATININE mg/dL 1.18* 1.04* 1.12* 1.33*   CALCIUM mg/dL 7.1* 7.5* 7.5* 7.8*   BILIRUBIN mg/dL  --  0.4 0.4 0.2   ALK PHOS U/L  --  129* 119* 128*   ALT (SGPT) U/L  --  28 15 20   AST (SGOT) U/L  --  26 16 21   GLUCOSE mg/dL 78 108* 101* 123       IMAGING STUDIES:  No new imaging studies to review.      I personally viewed and interpreted the patient's chart.    Meds reviewed/changed: Yes      Physical  Exam:    General:   Awake, alert, oriented x3. Speech clear with no aphasia  Back:    No tenderness of cervical or thoracic spine.  Mild TTP of lower lumbar spine.     Motor:    5/5 bilateral DF/PF and iliopsoas strength. No fasciculations, rigidity, spasticity, or abnormal movements.  Reflexes:   No clonus  Sensation:   Normal to light touch bilateral LE  Station and Gait:             Deferred to PT due to recent weakness        Assessment & Plan     ASSESSMENT:      Ataxia    Essential hypertension    Primary adenocarcinoma of upper lobe of left lung (HCC)    History of bilateral breast cancer    Stage 3a chronic kidney disease (HCC)    Confusion    Chemotherapy-induced neutropenia (HCC)    Metabolic acidosis    Bony metastasis (HCC)    57-year-old female with non-small cell lung cancer who presents with an abnormal cervical spine MRI suspicious for C1-C2 fractures as well as a T7 compression fracture and metastases at T12 and L1.  On exam, she is neurologically intact.  Denies any neck or mid back pain.  States she only has low back pain and pain radiating into the right leg that is impacting her ability to walk.  Feels as though she is walking better.  She is refusing to wear a hard collar and I discussed the risks of foregoing this with her in the event that she does have a cervical fracture.  She expressed understanding and stated she still will not wear it.  I have ordered a soft collar to be worn at all times until the CT cervical spine is completed and she is cleared by neurosurgery.  We will also continue with cervical spinal precautions.  She has degenerative L5-S1 lateral recess stenosis that we do not believe is related to the cancer but may be causing her low back pain and right leg symptoms.  I offered her an epidural steroid injection and she said she would like to take some time to think about it.  Since her symptoms are improving, I think it is reasonable to give her a day to think about it prior  "to proceeding. Recommend radiation oncology be consulted for her T12 and L1 metastatic lesions as radiation may help her pain relief in her lower back.    PLAN:   Cervical spine CT pending   Soft collar to be worn at all time - pt refused hard collar  Possible epidural steroid injection     I discussed the patient's findings and my recommendations with patient, nursing staff and Dr. Ferrera.       LOS: 1 day       Marly Mendez PA-C  8/5/2022  10:37 EDT    \"Dictated utilizing Dragon dictation\".      "

## 2022-08-05 NOTE — PROGRESS NOTES
Subjective       History of Present Illness    On 08/04/2022, I had the opportunity to see in consultation this 57-year-old female who has history of Stage IV lung cancer, undergoing chemotherapy by my partner, Dr. Snell. She also has history of bilateral breast cancer. The breast cancer is not metastatic. In any event, the patient was admitted to the hospital with confusion, fever and she was found to have neutropenia with sepsis. She was placed on antibiotics. The patient has slowly recovered today to the point that she is making sense in regard to her language. She is having some appetite and she has not had any further fever. She denies any sores in her mouth, cough, sputum production, shortness of breath, pleuritic pain or hemoptysis. Her appetite has been very dramatically decreased for the last week. She has had some nausea. She has had some vomiting, occasional loose bowel activity. No abdominal cramping, no passage of blood in the stool. Urination in volume decreased substantially before she came to the hospital. She has not had any rash. She was very confused for a few days, now she feels mentally much better.      Her vital signs have remained stable and she has been seen by hospitalist and Infectious Disease.    On 08/05/2022 now that the patient is more awake and alert she is now rating a pain in the sacrum on the right side that she attributes to sciatica. The pain is intensity 7 out of 10 propagated into the right lower extremity. It is throbbing in nature and continues. She has not too much pain in her spine in spite of having significant metastatic disease. She has no headache. Her mind is completely clear today. She has been eating okay. Her bowel activity is ongoing. Urination is normal. She has no cough or shortness of breath. She still has fatigue.    ·   Past Medical History, Past Surgical History, Social History, Family History have been reviewed and are without significant changes except  as mentioned.    Review of Systems   Constitutional: Positive for activity change, appetite change and fatigue.   HENT: Negative.    Respiratory: Negative.    Cardiovascular: Negative.    Gastrointestinal: Negative.    Genitourinary: Negative.    Musculoskeletal: Positive for back pain.   Skin: Negative.    Neurological: Negative.    Hematological: Negative.    Psychiatric/Behavioral: Negative.       A comprehensive 14 point review of systems was performed and was negative except as mentioned.    Medications:  The current medication list was reviewed in the EMR    ALLERGIES:  No Known Allergies    Objective      Vitals:    08/04/22 1608 08/05/22 0458 08/05/22 0717 08/05/22 1119   BP: 105/80 120/88 109/79 103/72   BP Location: Left arm Left arm Left arm Left arm   Patient Position: Sitting Lying Sitting Lying   Pulse: 74 77 86 82   Resp: 25 20 18 18   Temp: 97.8 °F (36.6 °C) 97.6 °F (36.4 °C) 97.5 °F (36.4 °C) 97.6 °F (36.4 °C)   TempSrc: Oral Oral Oral Oral   SpO2: 96% 95% 97% 96%   Weight:       Height:         Current Status 7/26/2022   ECOG score 0       Physical Exam  Constitutional:       Appearance: She is ill-appearing.   HENT:      Head: Normocephalic.   Eyes:      General: No scleral icterus.  Cardiovascular:      Rate and Rhythm: Normal rate and regular rhythm.      Heart sounds: No murmur heard.    No gallop.   Pulmonary:      Effort: Pulmonary effort is normal. No respiratory distress.      Breath sounds: Normal breath sounds. No stridor. No wheezing, rhonchi or rales.   Abdominal:      General: Abdomen is flat. There is no distension.      Palpations: Abdomen is soft. There is no mass.      Tenderness: There is no abdominal tenderness. There is no guarding or rebound.      Hernia: No hernia is present.   Musculoskeletal:      Comments: Pain in the sacrum   Lymphadenopathy:      Cervical: No cervical adenopathy.   Skin:     General: Skin is warm and dry.      Coloration: Skin is pale.   Neurological:       Mental Status: She is alert. Mental status is at baseline.   Psychiatric:         Mood and Affect: Mood normal.           RECENT LABS:  Hematology WBC   Date Value Ref Range Status   08/05/2022 2.64 (L) 3.40 - 10.80 10*3/mm3 Final   08/05/2022 2.72 (L) 3.40 - 10.80 10*3/mm3 Final     RBC   Date Value Ref Range Status   08/05/2022 3.17 (L) 3.77 - 5.28 10*6/mm3 Final   08/05/2022 3.11 (L) 3.77 - 5.28 10*6/mm3 Final     Hemoglobin   Date Value Ref Range Status   08/05/2022 9.2 (L) 12.0 - 15.9 g/dL Final   08/05/2022 9.3 (L) 12.0 - 15.9 g/dL Final     Hematocrit   Date Value Ref Range Status   08/05/2022 28.5 (L) 34.0 - 46.6 % Final   08/05/2022 28.6 (L) 34.0 - 46.6 % Final     Platelets   Date Value Ref Range Status   08/05/2022 180 140 - 450 10*3/mm3 Final   08/05/2022 187 140 - 450 10*3/mm3 Final          Assessment & Plan    57-year-old white female has been admitted to the hospital with neutropenic sepsis. Blood cultures are negative. There is no obvious source of infection. The patient was placed on Neupogen yesterday by me. Her white count is starting to improve and actually the patient feels substantially better today. She has minimal if any headache. No sores in her mouth. No bone pain. She was mildly hungry this morning. She had some toast and egg. She has not had any further diarrhea. No abdominal pain. I do believe that the patient has neutropenic fever. The source of infection as usual is very difficult to find if any but the patients get better on antibiotics. I will propose to continue her Neupogen injection today and for the next day to keep helping her white count to return and allow her to improve her performance status, go up to walking and moving around and be able to discharge home in the next 2 or 3 days.      The patient also has anemia associated with chemotherapy treatment. Right now she has no need for blood products.      The patient also has a normal platelet count; fortunately she will not  require any platelet products given this number.      The radiological assessment failed to document any source of infection and even an MRI of the brain failed to document any abnormality.      The other issues discussed by Dr. Snell in her note are relatively quiet at this time including her anxiety and depression. Blood pressure is under good control and her reflux symptomatology is quiet. She is not eating too much. Her port does not look infected.    On 08/05/2022, I had the opportunity to review all her MRIs. Basically she has had an MRI of the brain, cervical, thoracic, lumbar spines. It caught my attention the lesions in C2, obviously the lesions in the lumbar spine and the lesion in the sacrum, especially on the right side that is probably the reason she has so much pain throbbing in that location with propagation into the right lower extremity.     Her overall clinical status has improved since the admission. Two days ago she was in [place] with confusion, yesterday she was obnubilated but better, today, she is completely sharp and back to normality. The MRI of the brain shows no evidence of meningeal carcinomatosis or intracranial metastatic disease. She has no pain in the neck.     Her appetite is improving. Her bowel activity is normal. Urination has improved and the patient feels substantially better.     RECOMMENDATIONS:    1. I will continue her on Neupogen for 1 more day. Her white blood count is slowly improving and we would like to improve white blood count further to minimize any further need for antibiotics maybe stopping these medicines as early as tomorrow. Her blood cultures remain negative.   2. The patient seems to be radiologically speaking has progressive disease with no new lesions in the bone. I am concerned about the lesion in the sacrum that is triggering pain into the right lower extremity. She is taking pain medicine on p.r.n. basis. That is okay. I do believe that Radiation  Oncology needs to see her in consideration of treating palliatively her sacral lesion.     I do not know if we would like to proceed with treatment of C2. This will trigger a lot of sore throat and difficulty swallowing. This is not giving her pain yet. That is a decision that is difficult to make under the present circumstances.     Given the fact that it seems that at least the cancer with metastasis to the bone has progressed, probably she will require modification in chemotherapy regimen.     Finally, she was supposed to have an ultrasound of her kidneys as an outpatient. We will go ahead and proceed with this today for review tomorrow.     Her white count, hemoglobin and platelets are better. The chemistry profile remains about the same.     The patient is substantially better though.    · DURING THE VISIT WITH THE PATIENT TODAY , PATIENT HAD FACE MASK, I HAD PROPER PROTECTIVE EQUIPMENT, AND I DID HAND HYGIENE WITH SOAP AND WATER BEFORE AND AFTER THE VISIT.                8/5/2022      CC:

## 2022-08-05 NOTE — CASE MANAGEMENT/SOCIAL WORK
Continued Stay Note  [facility]     Patient Name: Holli Patel  MRN: [id number]  Today's Date: 8/5/2022    Admit Date: 8/3/2022     Discharge Plan     Row Name 08/05/22 1024       Plan    Plan Home w/ HH (pending), family to transport    Patient/Family in Agreement with Plan yes    Plan Comments CSW spoke to patient at bedside. CSW explained to patient the benefits of HH and that PT is recommending HH for her. Patient is agreeable to HH referrals to an agency that accepts her insurance. Patient denies any further d/c needs. Her family can transport her home. CCP to follow pending HH referrals. BRANDT, BHARATHIW               Discharge Codes    No documentation.               Expected Discharge Date and Time     Expected Discharge Date Expected Discharge Time    Aug 5, 2022             LOUIS ALONZO

## 2022-08-05 NOTE — PLAN OF CARE
Goal Outcome Evaluation:           Progress: declining  Outcome Evaluation: Pt. A&Ox4 throughout shift. Coordination while ambulating has improved. Requires asst x1 to the bathroom. Soft collar applied to stabilize cervical spine. CT scan completed. Awaiting renal ultrasound. Continues to receive IV antibiotics. Port accessed with good blood return. No fever during this shift. VSS.

## 2022-08-05 NOTE — NURSING NOTE
Attempted to apply cervical collar. Medium is the smallest size available per Distribution. Medium does not fit patient properly and pt refuses to wear at this time.

## 2022-08-05 NOTE — PROGRESS NOTES
ID note for neutropenic sepsis  S: feels okay. Imbalance better. No f/c/sweats this am. Hit 101.9 yesterday afternoon    O: Temp:  [97.4 °F (36.3 °C)-97.8 °F (36.6 °C)] 97.5 °F (36.4 °C)  Heart Rate:  [74-86] 86  Resp:  [18-25] 18  BP: (105-120)/(79-88) 109/79  GENERAL: Awake and alert, in no acute distress.   HEENT:  Hearing is grossly normal.   LUNGS:  normal respiratory effort.   SKIN:  without cutaneous eruptions   PSYCHIATRIC: Appropriate mood, affect, insight, and judgment.         Results Review:  WBC 2.64, plt 183, hgb 9.2  Cr 1.18     Microbiology:  COVID 19 negative  BCx ngtd  GI PCR neg       A/p  Neutropenic sepsis  Ataxia  Metastatic lung adenocarcinoma    Afebrile since 8/3. WBC much improved. We will cont [id number] iv q12, renally dosed  Since her fevers and neutropenia have resolved and she has negative cultures, she should be at low risk for infection.  I will plan to cont he cefepime through 8/6 but if she is discharged prior to this date can be sent out off antibiotics.    With abx plan in place we will not plan to see daily. Please call with questions or conerns

## 2022-08-05 NOTE — PLAN OF CARE
Goal Outcome Evaluation:  Plan of Care Reviewed With: patient        Progress: improving  Outcome Evaluation: Pt resting in bed, A&O x4, confusion seems to have diminished. Pt's gait appears to be getting more steady, still x1 assist. PRN pain medication given x1 dose overnight. Pt still having diarrhea and refusing to wear c collar. Pt VSS stable, waiting for CT scan this morning.

## 2022-08-05 NOTE — TELEPHONE ENCOUNTER
Caller: LESLEY WITH  HOME HEALTH    Relationship:     Best call back number: [phone]    What orders are you requesting (i.e. lab or imaging): ORDERS FOR PHYSICAL THERAPY     In what timeframe would the patient need to come in: PATIENT WILL BE COMING HOME FROM THE HOSPITAL ON Saturday.     Where will you receive your lab/imaging services:     Additional notes:  PLEASE CONTACT LESLEY TO ADVISE.       THANKS

## 2022-08-05 NOTE — PROGRESS NOTES
Chayo informed nursing about the VBG around the time of order and told them to contact lab to get the blood drawn. Told staff to call when blood is ready.

## 2022-08-05 NOTE — DISCHARGE PLACEMENT REQUEST
"Taniya Patel (57 y.o. Female)             Date of Birth   1965    Social Security Number       Address   [address] [place] [address]    Home Phone   [phone]    MRN   [id number]       [facility]    Marital Status                               Admission Date   8/3/22    Admission Type   Emergency    Admitting Provider   Toby Jade MD    Attending Provider   Toby Jade MD    Department, Room/Bed   [address], [address]381[address]1       Discharge Date       Discharge Disposition       Discharge Destination                               Attending Provider: Toby Jade MD    Allergies: No Known Allergies    Isolation: None   Infection: None   Code Status: CPR   Advance Care Planning Activity    Ht: 160 cm (63\")   Wt: 76.9 kg (169 lb 8.5 oz)    Admission Cmt: None   Principal Problem: Ataxia [R27.0]                 Active Insurance as of 8/3/2022     Primary Coverage     Payor Plan Insurance Group Employer/Plan Group    HU[employer] HUMANA [id number]     Payor Plan Address Payor Plan Phone Number Payor Plan Fax Number Effective Dates    [address] [address] [phone]  1/1/2020 - None Entered    [facility] [id number]       Subscriber Name Subscriber Birth Date Member ID       TANIYA PATEL 1965 [id number]                 Emergency Contacts      (Rel.) Home Phone Work Phone Mobile Phone    MILKABK KING (Spouse) [phone] -- --              "

## 2022-08-05 NOTE — CONSULTS
DIAGNOSIS and REASON FOR CONSULTATION: -metastatic lung cancer, painful sacral metastases  for advice and recommendations for this diagnosis    Referring Provider:  Toby Jade MD    HISTORY OF PRESENT ILLNESS:  The patient is a 57 y.o. year old female who is well-known to our department from previous treatment of her left lung primary when we delivered 6600 cGy 33 treatments concurrent with chemotherapy from May 25 through July 12, 2021.  She did at that point also have a diagnosis of bilateral breast cancers which we anticipated treating thereafter.     She underwent stereotactic biopsy and and ultrasound-guided biopsy of the abnormality in the left breast on April 7, 2021 and that pathology revealed an invasive ductal carcinoma, grade 2 measuring 2 mm from the right breast.  There was no perineural nor lymphovascular invasion noted.  There was associated DCIS of low to intermediate grade with no necrosis.  That tissue was found to be strongly positive for both estrogen and progesterone receptors.  Biopsy from the left breast also revealed invasive ductal carcinoma measuring 4.5 mm, grade 2, again without perineural or lymphovascular invasion.  This tissue was also found to be strongly positive for both estrogen and progesterone receptors.  Both specimens were also negative for the HER-2/melinda oncogene.     She underwent a PET scan on April 14, 2021 which showed hypermetabolic AP window node measuring 9 mm with an SUV of 7.5, left hilar lymphadenopathy with an SUV of 17.2, irregular lobulated lesion in the right breast measuring 3.7 x 3.8 cm felt to be secondary to recent biopsy, 1.1 cm left upper lobe nodule with an SUV of 9.7, focal area of uptake was noted within the central canal posterior to the T11-12 disc space and subtle area of asymmetric uptake was noted within the right acetabulum felt to be benign       She completed genetic testing on April 16, 2021 which showed no significant variant.  She went to  surgery on April 30, 2021 for a planned bronchoscopy, left video-assisted thoracoscopy and robotic assisted mediastinal lymphadenectomy.  Lymph nodes were harvested from the L5 and L6 region and frozen section revealed metastatic breast cancer.  An attempt was made to find the left upper lobe nodule but it was not able to be identified and the procedure was discontinued.  The final pathology revealed both nodes to be diffusely involved by metastatic moderately differentiated adenocarcinoma felt to be of pulmonary origin.  The L6 node measured 3 cm in greatest dimension and the L5 measured 1.5 cm with extranodal extension noted.  The tissue was found to be negative for PD-L1, ALK and ROS1.  Additionally, she completed an MRI of the brain on May 14, 2021 which showed no evidence of metastatic disease. Therefore this appears to be a T1 N2 M0 adenocarcinoma of lung origin though further evaluation of the thoracic spine is planned for May 25, 2021. She completed concurrent chemoradiation therapy 6600 cGy 33 treatments July 12, 2021.     Follow-up PET scan on August 5, 2021 showed a decrease in the left lung lesion as well as in the mediastinal and left hilar lymphadenopathy with decrease in hypermetabolism consistent with response to treatment, new segmental left eighth rib fracture and sclerosis of the left seventh rib favoring a nondisplaced fracture, focal uptake in the duodenum and an indeterminate lesion in the L1 vertebral body.  She had had previous MRI of the thoracic spine followed by MRI of the lumbar spine on May 27, 2021 which showed a 6 x 5 x 6 mm enhancing lesion in the posterior body of L1 on the left which was indeterminate and felt to be either a benign hemangioma or bony metastasis.  There was no abnormality appreciated on PET scan and observation was recommended.     MRI of the lumbar spine on October 1, 2021 showed interval enlargement of the enhancing lesion in the left posterior body of L1 now  increased to 14 x 14 x 12 mm and extending down to the anterior margin of the left pedicle at L1.  No other lesions were appreciated.  She underwent CT-guided biopsy of this lesion on October 14, 2021 which showed scanty fragments of poorly differentiated carcinoma, PDL negative.  Given these findings further intervention for the breast cancers have been placed on hold.     She went on to MRI of the brain on October 26, 2021 which showed a 5 mm focus of hyperintensity in the right posterior frontal region which was new but without enhancement consistent with a late acute to subacute ischemic infarct.  No other evidence of abnormalities appreciated.  PET scan completed on October 28, 2021 showed a slight decrease in the size of the 9 x 7 mm left upper lobe nodule which is photopenic, hypermetabolic activity in the area of previous radiation with an SUV of 3.6, no lymphadenopathy otherwise in the chest, resolution of the 6 mm left upper lobe nodule, less fluid in the right breast, no hypermetabolic axillary or subpectoral lymphadenopathy, a new 1.5 cm lytic lesion within the left aspect of L1 with an SUV of 13.3, new lytic lesion at the left L2 with an SUV of 8.1 and a punctate focus of hypermetabolic activity at the anterior aspect of L2 vertebral body which was new.  Additionally a new focus of hypermetabolic activity at a slightly prominent portion of the left adrenal gland is also noted with an SUV of 7.8.     Given these findings, her systemic therapy will be changed to Keytruda and Alimta and she will continue on with the letrozole. I was asked to see the patient at the request of the referring provider noted above for advice and recommendations regarding this diagnosis.      Interval hx 6/1/22:  To sum up her previous radiation treatments she completed radiation to the left lung/hilum 66Gy July 2021.  She completed SBRT to left adrenal metastases 35Gy in 5 fractions November 2021 and she completed SBRT to L1  November 2021 27Gy in 3 fractions.       She then proceeded with cycle 1 Alimta and Keytruda. She had a bilateral diagnostic mammogram and ultrasound on 3/23/22 which showed complete resolution of the microcalcifications in the upper outer quadrant of the right breast and decrease in size of the postbiopsy hematoma.  No suspicious abnormalities in the right breast.Benign-appearing calcifications at the site of malignancy in the left breast upper inner quadrant.  No other suspicious findings.     She had a PET scan on 4/11/2022 which showed new groundglass opacities in the left upper lobe which are most likely postradiation changes.  Other groundglass opacities in the left lung as well as the right lung remained stable. Increased nodularity of the left adrenal gland with an SUV of 5.6, previously 3.5.    Increased uptake in the L1 vertebra in the right posterior aspect with an SUV of 3.6.  Left L1 sclerosis increased     She completed repeat SBRT to the left adrenal metastases and and L1 met on 7/13/22.  She received a dose of 30Gy in 6 fractions to the L spine and 30 Gy in 5 fractions to the left adrenal metastases.  She is currently admitted to the hospital with neutropenic fever.  She had a mri of the cervical, thoracic and lumbar spine on 8/3/22 which showed multiple new vertebral metastases including a C2  Lesion extending into the spinous process with edema of the soft tissue, as well as a 5cm met in the right sacral ala.      She had a CT cervical spine on 8/5/22 which showed a lytic lesion involving the posterior elements of C2 into the spinous process and posterior left lamina with no evidence fo fracture and mild degree of epidural enhancement with 1-1.5mm thick area of epidural extension suspected.     She was evaluated by neurosurgery and placed in a c collar yesterday.   She denies any neck pain but does have low back pain that radiates down her right leg. She denies weakness or numbness.  I was asked to  see the patient at the request of the referring provider noted above for advice and recommendations regarding this diagnosis.    Objective     Past Medical History: she  has a past medical history of Anxiety, Clot, Dyspnea on exertion, Elevated cholesterol, Frequent urination, SHAYY (generalized anxiety disorder), GERD (gastroesophageal reflux disease), High blood pressure, Hyperlipidemia, Hypothyroidism, Lung cancer (HCC), Lung nodule, Malignant neoplasm of upper-outer quadrant of right breast in female, estrogen receptor positive (HCC) (4/13/2021), Migraine, and PONV (postoperative nausea and vomiting).    Past Surgical History:  she has a past surgical history that includes Wrist surgery (Right); Breast biopsy (Bilateral, 04/07/2021); Closed reduction hip dislocation (Left, 4/30/2021); Colonoscopy; Venous Access Device (Port) (Right, 5/20/2021); Tubal ligation; and Venous Access Device (Port) (Right, 11/29/2021).    Meds:    Current Facility-Administered Medications:   •  acetaminophen (TYLENOL) tablet 650 mg, 650 mg, Oral, Q4H PRN, Toby Jade MD  •  ALPRAZolam (XANAX) tablet 0.5 mg, 0.5 mg, Oral, BID PRN, Toby Jade MD, 0.5 mg at 08/04/22 2135  •  atorvastatin (LIPITOR) tablet 10 mg, 10 mg, Oral, Nightly, Toby Jade MD, 10 mg at 08/04/22 2135  •  buPROPion XL (WELLBUTRIN XL) 24 hr tablet 150 mg, 150 mg, Oral, Daily, Toby Jade MD, 150 mg at 08/05/22 0935  •  cefepime 2 gm IVPB in 100 ml NS (VTB), 2 g, Intravenous, Q12H, Nikolai Hunt MD, 2 g at 08/05/22 1514  •  Filgrastim (NEUPOGEN) injection 300 mcg, 300 mcg, Subcutaneous, Q PM, Frederick Osorio MD, 300 mcg at 08/04/22 1606  •  FLUoxetine (PROzac) capsule 40 mg, 40 mg, Oral, Daily, Toby Jade MD, 40 mg at 08/05/22 0934  •  folic acid (FOLVITE) tablet 1 mg, 1 mg, Oral, Daily, Toby Jade MD, 1 mg at 08/05/22 0926  •  gabapentin (NEURONTIN) capsule 600 mg, 600 mg, Oral, Q8H, Toby Jade MD, 600 mg at 08/05/22 1514  •  heparin  injection 500 Units, 5 mL, Intravenous, PRN, Toby Jade MD  •  letrozole (FEMARA) tablet 2.5 mg, 2.5 mg, Oral, Daily, Toby Jade MD, 2.5 mg at 08/05/22 0935  •  levothyroxine (SYNTHROID, LEVOTHROID) tablet 25 mcg, 25 mcg, Oral, Daily, Toby Jade MD, 25 mcg at 08/05/22 0538  •  OLANZapine (zyPREXA) tablet 5 mg, 5 mg, Oral, Nightly, Toby Jade MD, 5 mg at 08/04/22 2136  •  ondansetron (ZOFRAN) tablet 4 mg, 4 mg, Oral, Q6H PRN **OR** ondansetron (ZOFRAN) injection 4 mg, 4 mg, Intravenous, Q6H PRN, Toby Jade MD  •  oxyCODONE-acetaminophen (PERCOCET) 5-325 MG per tablet 1 tablet, 1 tablet, Oral, Q6H PRN, Toby Jade MD, 1 tablet at 08/04/22 2136  •  pantoprazole (PROTONIX) EC tablet 40 mg, 40 mg, Oral, QAM, Toby Jade MD, 40 mg at 08/05/22 0539  •  prochlorperazine (COMPAZINE) tablet 10 mg, 10 mg, Oral, Q6H PRN, Toby Jade MD  •  propranolol LA (INDERAL LA) 24 hr capsule 60 mg, 60 mg, Oral, Daily, Toby Jade MD, 60 mg at 08/05/22 0934  •  sodium bicarbonate tablet 1,300 mg, 1,300 mg, Oral, TID, Keith Quintero MD  •  Access VAD, , , Once **AND** sodium chloride 0.9 % flush 10 mL, 10 mL, Intravenous, PRN, Toby Jade MD  •  sodium chloride 0.9 % flush 10 mL, 10 mL, Intravenous, Q12H, Toby Jade MD, 10 mL at 08/05/22 0947  •  sodium chloride 0.9 % flush 10 mL, 10 mL, Intravenous, PRN, Toby Jade MD  •  sodium chloride 0.9 % flush 20 mL, 20 mL, Intravenous, PRN, Toby Jade MD    Allergies:  No Known Allergies    Family History:  her family history includes Alcohol abuse in her brother; Cancer in her father; Colon cancer in her maternal grandmother; Lymphoma in her father; Prostate cancer in her father.    Social History:  she  reports that she quit smoking about 19 months ago. Her smoking use included electronic cigarette and cigarettes. She started smoking about 41 years ago. She has a 30.00 pack-year smoking history. She has never used smokeless tobacco. She reports  that she does not drink alcohol and does not use drugs.    Pertinent Findings on   Review of Systems   Constitutional: Positive for fatigue and unexpected weight change.   HENT:  Negative.    Respiratory: Negative.    Genitourinary: Positive for pelvic pain.    Musculoskeletal: Positive for back pain, gait problem and neck pain.   Neurological: Positive for gait problem. Negative for dizziness, extremity weakness, headaches and numbness.   Psychiatric/Behavioral: Negative.    :  Subjective     Vitals:    08/05/22 0458 08/05/22 0717 08/05/22 1119 08/05/22 1535   BP: 120/88 109/79 103/72 112/75   BP Location: Left arm Left arm Left arm Left arm   Patient Position: Lying Sitting Lying Lying   Pulse: 77 86 82 82   Resp: 20 18 18 18   Temp: 97.6 °F (36.4 °C) 97.5 °F (36.4 °C) 97.6 °F (36.4 °C) 98.8 °F (37.1 °C)   TempSrc: Oral Oral Oral Oral   SpO2: 95% 97% 96% 97%   Weight:       Height:           Performance Status: (2) Ambulatory and capable of self care, unable to carry out work activity, up and about > 50% or waking hours    Pertinent Findings on  Physical Exam  Constitutional:       Appearance: Normal appearance.   Musculoskeletal:      Comments: Decreased rom of right hip, tender over right sacrum   Neurological:      General: No focal deficit present.      Mental Status: She is alert.   Psychiatric:         Mood and Affect: Mood normal.     :       Assessment:57 year old female well known to me with stage IV lung cancer one month out from stereotactic radiotherapy to the left adrenal met and L1 now with progressive disease in C1 and sacrum.    Plan:  I reviewed her imaging and discussed with her my recommendation for palliative radiation to the C1 lesion and sacrum.  We will bring her over Monday morning for simulation.  I discussed possible  effects of fatiuge, sore throat esophagitis abdominal cramping and diarrhea.  She voiced understanding.  Objective   I spent greater than 50 mins ([id number]) on the unit and  of that time 40 minutes  in counseling and coordination of care, including my review of imaging and pathology; indications, goals, logistics, alternatives and risks - both common and rare - for my recommendations as well as surveillance and potential outcomes. NCCN Guidelines were consulted, discussed with the patient and used to make the above recommendations.

## 2022-08-05 NOTE — PROGRESS NOTES
Name: Holli Patel ADMIT: 8/3/2022   : 1965  PCP: Vandana Gastelum MD    MRN: [id number] LOS: 1 days   AGE/SEX: 57 y.o. female  ROOM: [facility]     Subjective   Subjective   No new complaints overnight.  She feels her walking is much improved and she has been walking to the bathroom with assistance.  Still has some shooting pains down her right leg.  Very unhappy with the thought of wearing a neck brace.    Review of Systems     Objective   Objective   Vital Signs  Temp:  [97.2 °F (36.2 °C)-97.8 °F (36.6 °C)] 97.5 °F (36.4 °C)  Heart Rate:  [74-86] 86  Resp:  [18-25] 18  BP: (105-120)/(76-88) 109/79  SpO2:  [95 %-97 %] 97 %  on   ;   Device (Oxygen Therapy): room air  Body mass index is 30.03 kg/m².  Physical Exam  Vitals and nursing note reviewed.   Constitutional:       General: She is not in acute distress.     Appearance: Normal appearance.   Neck:      Vascular: No JVD.      Trachea: No tracheal deviation.   Cardiovascular:      Rate and Rhythm: Normal rate and regular rhythm.      Heart sounds: Normal heart sounds.   Pulmonary:      Effort: Pulmonary effort is normal. No respiratory distress.      Breath sounds: Normal breath sounds.   Abdominal:      General: Bowel sounds are normal.      Palpations: Abdomen is soft.      Tenderness: There is no abdominal tenderness.   Skin:     General: Skin is warm and dry.   Neurological:      General: No focal deficit present.      Mental Status: She is alert and oriented to person, place, and time.   Psychiatric:         Behavior: Behavior normal. Behavior is cooperative.         Results Review     I reviewed the patient's new clinical results.  Results from last 7 days   Lab Units 22  0443 22  0356 22  0806 22  1125   WBC 10*3/mm3 2.64* 1.16* 0.68* 0.51*   HEMOGLOBIN g/dL 9.2* 9.7* 9.6* 10.4*   PLATELETS 10*3/mm3 180 183 169 193     Results from last 7 days   Lab Units 22  0443 22  0356 22  0806 22  1128    SODIUM mmol/L 140 133* 133* 137   POTASSIUM mmol/L 3.5 3.9 4.5 4.5   CHLORIDE mmol/L 110* 104 104 107   CO2 mmol/L 17.0* 17.6* 19.0* 18.2*   BUN mg/dL 11 12 11 15   CREATININE mg/dL 1.18* 1.04* 1.12* 1.33*   GLUCOSE mg/dL 78 108* 101* 123   EGFR mL/min/1.73 53.3* 62.0 56.8* 46.2*     Results from last 7 days   Lab Units 08/04/22  0356 08/03/22  0806 08/02/22  1128 08/02/22  1120   ALBUMIN g/dL 3.60 3.70 3.80 3.90  2.9   BILIRUBIN mg/dL 0.4 0.4 0.2  --    ALK PHOS U/L 129* 119* 128*  --    AST (SGOT) U/L 26 16 21  --    ALT (SGPT) U/L 28 15 20  --      Results from last 7 days   Lab Units 08/05/22  0443 08/04/22  0356 08/03/22  0806 08/02/22  1128 08/02/22  1120   CALCIUM mg/dL 7.1* 7.5* 7.5* 7.8* 7.8*   ALBUMIN g/dL  --  3.60 3.70 3.80 3.90  2.9   MAGNESIUM mg/dL  --   --   --   --  1.8   PHOSPHORUS mg/dL  --   --   --   --  1.2*     Results from last 7 days   Lab Units 08/03/22  0806   PROCALCITONIN ng/mL 0.22   LACTATE mmol/L 0.9     Hemoglobin A1C   Date/Time Value Ref Range Status   08/04/2022 0356 5.20 4.80 - 5.60 % Final     Glucose   Date/Time Value Ref Range Status   08/04/2022 0553 109 70 - 130 mg/dL Final     Comment:     Meter: [id number] : [id number] [facility] NA   08/03/2022 2342 104 70 - 130 mg/dL Final     Comment:     Meter: [id number] : [id number] [employer] NA   08/03/2022 1747 204 (H) 70 - 130 mg/dL Final     Comment:     Meter: [id number] : [id number] Kayla EDMONDSON   08/03/2022 1746 213 (H) 70 - 130 mg/dL Final     Comment:     Meter: [id number] : [id number] Kayla EDMONDSON       Scheduled Medications  atorvastatin, 10 mg, Oral, Nightly  buPROPion XL, 150 mg, Oral, Daily  cefepime, 2 g, Intravenous, Q12H  filgrastim (NEUPOGEN) injection, 300 mcg, Subcutaneous, Q PM  FLUoxetine, 40 mg, Oral, Daily  folic acid, 1 mg, Oral, Daily  gabapentin, 600 mg, Oral, Q8H  letrozole, 2.5 mg, Oral, Daily  levothyroxine, 25 mcg, Oral, Daily  OLANZapine, 5 mg, Oral,  Nightly  pantoprazole, 40 mg, Oral, QAM  propranolol LA, 60 mg, Oral, Daily  sodium chloride, 10 mL, Intravenous, Q12H    Infusions   Diet  Diet Regular       Assessment/Plan     Active Hospital Problems    Diagnosis  POA   • **Ataxia [R27.0]  Yes   • Confusion [R41.0]  Yes   • Chemotherapy-induced neutropenia (HCC) [D70.1, T45.1X5A]  Yes   • Metabolic acidosis [E87.2]  Yes   • Bony metastasis (HCC) [C79.51]  Yes   • Stage 3a chronic kidney disease (HCC) [N18.31]  Yes   • History of bilateral breast cancer [Z85.3]  Not Applicable   • Primary adenocarcinoma of upper lobe of left lung (HCC) [C34.12]  Yes   • Essential hypertension [I10]  Yes      Resolved Hospital Problems   No resolved problems to display.       57 y.o. female admitted with Ataxia.    Note plans for antibiotics through 8/6 per infectious disease.  She has been afebrile.    Neurosurgery evaluating extent of bony involvement of the spine.  She has known bony metastasis and pathologic fracture.  Not wearing her cervical collar at the time of my visit due to fitting issues.    Mild acidosis, mild hypocalcemia.  Renal function near baseline.  Outpatient appointment this week for renal ultrasound.  Will have her nephrologist see in hospital.  She sees Dr. Hidalgo.      · SCDs for DVT prophylaxis.  · Full code.  · Discussed with patient.  · Anticipate discharge home with family timing yet to be determined.      Toby Jade MD  [facility]  08/05/22  07:34 EDT

## 2022-08-06 PROBLEM — E83.39 HYPOPHOSPHATEMIA: Status: ACTIVE | Noted: 2022-08-06

## 2022-08-06 PROBLEM — E87.6 HYPOPOTASSEMIA: Status: ACTIVE | Noted: 2022-08-06

## 2022-08-06 LAB
25(OH)D3 SERPL-MCNC: 6.5 NG/ML (ref 30–100)
ALBUMIN SERPL-MCNC: 3.4 G/DL (ref 3.5–5.2)
ALBUMIN SERPL-MCNC: 3.6 G/DL (ref 3.5–5.2)
ALBUMIN/GLOB SERPL: 1.1 G/DL
ALP SERPL-CCNC: 149 U/L (ref 39–117)
ALT SERPL W P-5'-P-CCNC: 36 U/L (ref 1–33)
ANION GAP SERPL CALCULATED.3IONS-SCNC: 13 MMOL/L (ref 5–15)
ANION GAP SERPL CALCULATED.3IONS-SCNC: 13 MMOL/L (ref 5–15)
AST SERPL-CCNC: 33 U/L (ref 1–32)
ATMOSPHERIC PRESS: 753.6 MMHG
BASE EXCESS BLDV CALC-SCNC: -6.9 MMOL/L (ref -2–2)
BASOPHILS # BLD MANUAL: 0.18 10*3/MM3 (ref 0–0.2)
BASOPHILS NFR BLD MANUAL: 1 % (ref 0–1.5)
BDY SITE: ABNORMAL
BILIRUB SERPL-MCNC: <0.2 MG/DL (ref 0–1.2)
BUN SERPL-MCNC: 10 MG/DL (ref 6–20)
BUN SERPL-MCNC: 10 MG/DL (ref 6–20)
BUN/CREAT SERPL: 8.4 (ref 7–25)
BUN/CREAT SERPL: 9.1 (ref 7–25)
CALCIUM SPEC-SCNC: 7.3 MG/DL (ref 8.6–10.5)
CALCIUM SPEC-SCNC: 7.6 MG/DL (ref 8.6–10.5)
CHLORIDE SERPL-SCNC: 110 MMOL/L (ref 98–107)
CHLORIDE SERPL-SCNC: 111 MMOL/L (ref 98–107)
CO2 SERPL-SCNC: 17 MMOL/L (ref 22–29)
CO2 SERPL-SCNC: 19 MMOL/L (ref 22–29)
CREAT SERPL-MCNC: 1.1 MG/DL (ref 0.57–1)
CREAT SERPL-MCNC: 1.19 MG/DL (ref 0.57–1)
DEPRECATED RDW RBC AUTO: 40.3 FL (ref 37–54)
EGFRCR SERPLBLD CKD-EPI 2021: 52.8 ML/MIN/1.73
EGFRCR SERPLBLD CKD-EPI 2021: 58 ML/MIN/1.73
ERYTHROCYTE [DISTWIDTH] IN BLOOD BY AUTOMATED COUNT: 12.1 % (ref 12.3–15.4)
GLOBULIN UR ELPH-MCNC: 3.4 GM/DL
GLUCOSE SERPL-MCNC: 85 MG/DL (ref 65–99)
GLUCOSE SERPL-MCNC: 93 MG/DL (ref 65–99)
HCO3 BLDV-SCNC: 18.9 MMOL/L (ref 22–28)
HCT VFR BLD AUTO: 31 % (ref 34–46.6)
HGB BLD-MCNC: 10 G/DL (ref 12–15.9)
LYMPHOCYTES # BLD MANUAL: 0 10*3/MM3 (ref 0.7–3.1)
LYMPHOCYTES NFR BLD MANUAL: 10.2 % (ref 5–12)
MAGNESIUM SERPL-MCNC: 1.8 MG/DL (ref 1.6–2.6)
MCH RBC QN AUTO: 29.8 PG (ref 26.6–33)
MCHC RBC AUTO-ENTMCNC: 32.3 G/DL (ref 31.5–35.7)
MCV RBC AUTO: 92.3 FL (ref 79–97)
MODALITY: ABNORMAL
MONOCYTES # BLD: 1.83 10*3/MM3 (ref 0.1–0.9)
MYELOCYTES NFR BLD MANUAL: 3.1 % (ref 0–0)
NEUTROPHILS # BLD AUTO: 15.34 10*3/MM3 (ref 1.7–7)
NEUTROPHILS NFR BLD MANUAL: 85.7 % (ref 42.7–76)
PCO2 BLDV: 38.2 MM HG (ref 41–51)
PH BLDV: 7.3 PH UNITS (ref 7.31–7.41)
PHOSPHATE SERPL-MCNC: 1.1 MG/DL (ref 2.5–4.5)
PLAT MORPH BLD: NORMAL
PLATELET # BLD AUTO: 226 10*3/MM3 (ref 140–450)
PMV BLD AUTO: 10.2 FL (ref 6–12)
PO2 BLDV: 42.2 MM HG (ref 35–45)
POTASSIUM SERPL-SCNC: 3.3 MMOL/L (ref 3.5–5.2)
POTASSIUM SERPL-SCNC: 4.1 MMOL/L (ref 3.5–5.2)
PROT SERPL-MCNC: 7 G/DL (ref 6–8.5)
RBC # BLD AUTO: 3.36 10*6/MM3 (ref 3.77–5.28)
RBC MORPH BLD: NORMAL
SAO2 % BLDCOA: 73 % (ref 92–99)
SODIUM SERPL-SCNC: 140 MMOL/L (ref 136–145)
SODIUM SERPL-SCNC: 143 MMOL/L (ref 136–145)
TOTAL RATE: 16 BREATHS/MINUTE
URATE SERPL-MCNC: 3.8 MG/DL (ref 2.4–5.7)
VARIANT LYMPHS NFR BLD MANUAL: 0 % (ref 19.6–45.3)
WBC MORPH BLD: NORMAL
WBC NRBC COR # BLD: 17.9 10*3/MM3 (ref 3.4–10.8)

## 2022-08-06 PROCEDURE — 84100 ASSAY OF PHOSPHORUS: CPT | Performed by: INTERNAL MEDICINE

## 2022-08-06 PROCEDURE — 80053 COMPREHEN METABOLIC PANEL: CPT | Performed by: INTERNAL MEDICINE

## 2022-08-06 PROCEDURE — 25010000002 FILGRASTIM 300 MCG/0.5ML SOLUTION PREFILLED SYRINGE: Performed by: INTERNAL MEDICINE

## 2022-08-06 PROCEDURE — 85025 COMPLETE CBC W/AUTO DIFF WBC: CPT | Performed by: INTERNAL MEDICINE

## 2022-08-06 PROCEDURE — 99232 SBSQ HOSP IP/OBS MODERATE 35: CPT | Performed by: INTERNAL MEDICINE

## 2022-08-06 PROCEDURE — 83735 ASSAY OF MAGNESIUM: CPT | Performed by: INTERNAL MEDICINE

## 2022-08-06 PROCEDURE — 84550 ASSAY OF BLOOD/URIC ACID: CPT | Performed by: INTERNAL MEDICINE

## 2022-08-06 PROCEDURE — 82306 VITAMIN D 25 HYDROXY: CPT | Performed by: INTERNAL MEDICINE

## 2022-08-06 PROCEDURE — 25010000002 CEFEPIME PER 500 MG: Performed by: INTERNAL MEDICINE

## 2022-08-06 PROCEDURE — 85007 BL SMEAR W/DIFF WBC COUNT: CPT | Performed by: INTERNAL MEDICINE

## 2022-08-06 PROCEDURE — 82803 BLOOD GASES ANY COMBINATION: CPT

## 2022-08-06 RX ORDER — POTASSIUM CHLORIDE 7.45 MG/ML
10 INJECTION INTRAVENOUS
Status: DISCONTINUED | OUTPATIENT
Start: 2022-08-06 | End: 2022-08-09 | Stop reason: HOSPADM

## 2022-08-06 RX ORDER — SUMATRIPTAN 50 MG/1
50 TABLET, FILM COATED ORAL
Refills: 5 | Status: DISCONTINUED | OUTPATIENT
Start: 2022-08-06 | End: 2022-08-09 | Stop reason: HOSPADM

## 2022-08-06 RX ORDER — POTASSIUM CHLORIDE 750 MG/1
40 TABLET, FILM COATED, EXTENDED RELEASE ORAL AS NEEDED
Status: DISCONTINUED | OUTPATIENT
Start: 2022-08-06 | End: 2022-08-09 | Stop reason: HOSPADM

## 2022-08-06 RX ORDER — POTASSIUM CHLORIDE 1.5 G/1.77G
40 POWDER, FOR SOLUTION ORAL AS NEEDED
Status: DISCONTINUED | OUTPATIENT
Start: 2022-08-06 | End: 2022-08-09 | Stop reason: HOSPADM

## 2022-08-06 RX ADMIN — SODIUM BICARBONATE 1300 MG: 650 TABLET ORAL at 09:17

## 2022-08-06 RX ADMIN — PROPRANOLOL HYDROCHLORIDE 60 MG: 60 CAPSULE, EXTENDED RELEASE ORAL at 09:17

## 2022-08-06 RX ADMIN — POTASSIUM CHLORIDE 40 MEQ: 1.5 POWDER, FOR SOLUTION ORAL at 12:07

## 2022-08-06 RX ADMIN — GABAPENTIN 600 MG: 300 CAPSULE ORAL at 05:59

## 2022-08-06 RX ADMIN — Medication 1 MG: at 09:17

## 2022-08-06 RX ADMIN — BUPROPION HYDROCHLORIDE 150 MG: 150 TABLET, EXTENDED RELEASE ORAL at 09:17

## 2022-08-06 RX ADMIN — Medication 10 ML: at 09:17

## 2022-08-06 RX ADMIN — Medication 10 ML: at 20:14

## 2022-08-06 RX ADMIN — LEVOTHYROXINE SODIUM 25 MCG: 0.03 TABLET ORAL at 05:58

## 2022-08-06 RX ADMIN — PANTOPRAZOLE SODIUM 40 MG: 40 TABLET, DELAYED RELEASE ORAL at 05:58

## 2022-08-06 RX ADMIN — GABAPENTIN 600 MG: 300 CAPSULE ORAL at 22:34

## 2022-08-06 RX ADMIN — ALPRAZOLAM 0.5 MG: 0.5 TABLET ORAL at 22:37

## 2022-08-06 RX ADMIN — FLUOXETINE HYDROCHLORIDE 40 MG: 20 CAPSULE ORAL at 09:17

## 2022-08-06 RX ADMIN — ATORVASTATIN CALCIUM 10 MG: 20 TABLET, FILM COATED ORAL at 20:14

## 2022-08-06 RX ADMIN — LETROZOLE 2.5 MG: 2.5 TABLET ORAL at 09:16

## 2022-08-06 RX ADMIN — SODIUM BICARBONATE 1300 MG: 650 TABLET ORAL at 20:15

## 2022-08-06 RX ADMIN — GABAPENTIN 600 MG: 300 CAPSULE ORAL at 13:41

## 2022-08-06 RX ADMIN — OXYCODONE AND ACETAMINOPHEN 1 TABLET: 5; 325 TABLET ORAL at 12:10

## 2022-08-06 RX ADMIN — CEFEPIME 2 G: 2 INJECTION, POWDER, FOR SOLUTION INTRAVENOUS at 04:51

## 2022-08-06 RX ADMIN — OXYCODONE AND ACETAMINOPHEN 1 TABLET: 5; 325 TABLET ORAL at 00:37

## 2022-08-06 RX ADMIN — FILGRASTIM 300 MCG: 300 INJECTION, SOLUTION INTRAVENOUS; SUBCUTANEOUS at 17:10

## 2022-08-06 RX ADMIN — POTASSIUM PHOSPHATE, MONOBASIC AND POTASSIUM PHOSPHATE, DIBASIC 45 MMOL: 224; 236 INJECTION, SOLUTION, CONCENTRATE INTRAVENOUS at 12:48

## 2022-08-06 RX ADMIN — POTASSIUM CHLORIDE 40 MEQ: 1.5 POWDER, FOR SOLUTION ORAL at 20:14

## 2022-08-06 RX ADMIN — OXYCODONE AND ACETAMINOPHEN 1 TABLET: 5; 325 TABLET ORAL at 06:00

## 2022-08-06 RX ADMIN — OLANZAPINE 5 MG: 5 TABLET ORAL at 20:14

## 2022-08-06 NOTE — PLAN OF CARE
Goal Outcome Evaluation:         Pt A&O x 4, family at bedside, Norco for pain, Imitrex started for migraines, potassium & phosphorus protocol initiated, ambulated in room with assist, no nausea, IV cefepime

## 2022-08-06 NOTE — PROGRESS NOTES
Subjective       History of Present Illness    On 08/04/2022, I had the opportunity to see in consultation this 57-year-old female who has history of Stage IV lung cancer, undergoing chemotherapy by my partner, Dr. Snell. She also has history of bilateral breast cancer. The breast cancer is not metastatic. In any event, the patient was admitted to the hospital with confusion, fever and she was found to have neutropenia with sepsis. She was placed on antibiotics. The patient has slowly recovered today to the point that she is making sense in regard to her language. She is having some appetite and she has not had any further fever. She denies any sores in her mouth, cough, sputum production, shortness of breath, pleuritic pain or hemoptysis. Her appetite has been very dramatically decreased for the last week. She has had some nausea. She has had some vomiting, occasional loose bowel activity. No abdominal cramping, no passage of blood in the stool. Urination in volume decreased substantially before she came to the hospital. She has not had any rash. She was very confused for a few days, now she feels mentally much better.      Her vital signs have remained stable and she has been seen by hospitalist and Infectious Disease.    On 08/05/2022 now that the patient is more awake and alert she is now rating a pain in the sacrum on the right side that she attributes to sciatica. The pain is intensity 7 out of 10 propagated into the right lower extremity. It is throbbing in nature and continues. She has not too much pain in her spine in spite of having significant metastatic disease. She has no headache. Her mind is completely clear today. She has been eating okay. Her bowel activity is ongoing. Urination is normal. She has no cough or shortness of breath. She still has fatigue.  On 08/06/2022 the patient still has pain in the right buttock propagated into the right lower extremity. She has been hungry, she has not been  nauseated, bowel activity is normal, urination is normal. She has no pain, no cough, no headache, no obtundation or issues in regard cognitive abilities. She has no shortness of breath. She has been up and about. She had a great breakfast this morning.       ·   Past Medical History, Past Surgical History, Social History, Family History have been reviewed and are without significant changes except as mentioned.    Review of Systems   Constitutional: Positive for fatigue.   HENT: Negative.    Respiratory: Negative.    Cardiovascular: Negative.    Gastrointestinal: Negative.    Musculoskeletal: Positive for back pain.   Neurological: Positive for numbness.   Hematological: Negative.    Psychiatric/Behavioral: Negative.       A comprehensive 14 point review of systems was performed and was negative except as mentioned.    Medications:  The current medication list was reviewed in the EMR    ALLERGIES:  No Known Allergies    Objective      Vitals:    08/05/22 1916 08/06/22 0456 08/06/22 0909 08/06/22 1245   BP: 106/75 109/70 118/79 138/95   BP Location: Left arm Left arm Left arm Left arm   Patient Position: Lying Lying Lying Lying   Pulse: 75 78 87 87   Resp: 18 18 18 18   Temp: 96.8 °F (36 °C) 96.9 °F (36.1 °C) 97.2 °F (36.2 °C) Comment: pt eating   TempSrc: Oral Oral Oral    SpO2: 95% 95% 94% 96%   Weight:       Height:         Current Status 7/26/2022   ECOG score 0       Physical Exam  Constitutional:       Appearance: Normal appearance.   HENT:      Head: Normocephalic.   Eyes:      General: No scleral icterus.  Cardiovascular:      Rate and Rhythm: Normal rate and regular rhythm.      Pulses: Normal pulses.      Heart sounds: Normal heart sounds.   Pulmonary:      Effort: Pulmonary effort is normal.      Breath sounds: Normal breath sounds.   Abdominal:      General: Abdomen is flat. There is no distension.      Tenderness: There is no abdominal tenderness. There is no guarding.   Musculoskeletal:      Right lower  leg: No edema.      Left lower leg: No edema.      Comments: Pain r post pelvic bone   Lymphadenopathy:      Cervical: No cervical adenopathy.   Skin:     General: Skin is warm and dry.   Neurological:      General: No focal deficit present.   Psychiatric:         Mood and Affect: Mood normal.         Thought Content: Thought content normal.         Judgment: Judgment normal.           RECENT LABS:  Hematology WBC   Date Value Ref Range Status   08/05/2022 2.64 (L) 3.40 - 10.80 10*3/mm3 Final   08/05/2022 2.72 (L) 3.40 - 10.80 10*3/mm3 Final     RBC   Date Value Ref Range Status   08/05/2022 3.17 (L) 3.77 - 5.28 10*6/mm3 Final   08/05/2022 3.11 (L) 3.77 - 5.28 10*6/mm3 Final     Hemoglobin   Date Value Ref Range Status   08/05/2022 9.2 (L) 12.0 - 15.9 g/dL Final   08/05/2022 9.3 (L) 12.0 - 15.9 g/dL Final     Hematocrit   Date Value Ref Range Status   08/05/2022 28.5 (L) 34.0 - 46.6 % Final   08/05/2022 28.6 (L) 34.0 - 46.6 % Final     Platelets   Date Value Ref Range Status   08/05/2022 180 140 - 450 10*3/mm3 Final   08/05/2022 187 140 - 450 10*3/mm3 Final          Assessment & Plan    57-year-old white female has been admitted to the hospital with neutropenic sepsis. Blood cultures are negative. There is no obvious source of infection. The patient was placed on Neupogen yesterday by me. Her white count is starting to improve and actually the patient feels substantially better today. She has minimal if any headache. No sores in her mouth. No bone pain. She was mildly hungry this morning. She had some toast and egg. She has not had any further diarrhea. No abdominal pain. I do believe that the patient has neutropenic fever. The source of infection as usual is very difficult to find if any but the patients get better on antibiotics. I will propose to continue her Neupogen injection today and for the next day to keep helping her white count to return and allow her to improve her performance status, go up to walking  and moving around and be able to discharge home in the next 2 or 3 days.      The patient also has anemia associated with chemotherapy treatment. Right now she has no need for blood products.      The patient also has a normal platelet count; fortunately she will not require any platelet products given this number.      The radiological assessment failed to document any source of infection and even an MRI of the brain failed to document any abnormality.      The other issues discussed by Dr. Snell in her note are relatively quiet at this time including her anxiety and depression. Blood pressure is under good control and her reflux symptomatology is quiet. She is not eating too much. Her port does not look infected.    On 08/05/2022, I had the opportunity to review all her MRIs. Basically she has had an MRI of the brain, cervical, thoracic, lumbar spines. It caught my attention the lesions in C2, obviously the lesions in the lumbar spine and the lesion in the sacrum, especially on the right side that is probably the reason she has so much pain throbbing in that location with propagation into the right lower extremity.     Her overall clinical status has improved since the admission. Two days ago she was in [place] with confusion, yesterday she was obnubilated but better, today, she is completely sharp and back to normality. The MRI of the brain shows no evidence of meningeal carcinomatosis or intracranial metastatic disease. She has no pain in the neck.     Her appetite is improving. Her bowel activity is normal. Urination has improved and the patient feels substantially better.     RECOMMENDATIONS:    1. I will continue her on Neupogen for 1 more day. Her white blood count is slowly improving and we would like to improve white blood count further to minimize any further need for antibiotics maybe stopping these medicines as early as tomorrow. Her blood cultures remain negative.   2. The patient seems to be  radiologically speaking has progressive disease with no new lesions in the bone. I am concerned about the lesion in the sacrum that is triggering pain into the right lower extremity. She is taking pain medicine on p.r.n. basis. That is okay. I do believe that Radiation Oncology needs to see her in consideration of treating palliatively her sacral lesion.     I do not know if we would like to proceed with treatment of C2. This will trigger a lot of sore throat and difficulty swallowing. This is not giving her pain yet. That is a decision that is difficult to make under the present circumstances.     Given the fact that it seems that at least the cancer with metastasis to the bone has progressed, probably she will require modification in chemotherapy regimen.     Finally, she was supposed to have an ultrasound of her kidneys as an outpatient. We will go ahead and proceed with this today for review tomorrow.     Her white count, hemoglobin and platelets are better. The chemistry profile remains about the same.     The patient is substantially better though.  On 08/06/2022 I discussed with the patient the fact that her radiological analysis documents again a lesion in the pelvic bone posteriorly on the right side that is the triggering factor for the symptomatology. I would like to be seen by Radiation Oncology in preparation for therapy with localized area to minimize pain.    For whatever reason she had no blood counts today. I am going to go ahead and order them along with a chemistry profile.     The patient should be almost ready to go home in the next 1-2 days once that she is seen by Radiation Oncology. Her treatment for the pelvic bone can start as an outpatient anytime next week.    I also am going to make an appointment to be seen by Salma Snell MD, in the office in a week or so to review her clinical status. I would not be surprised if her chemotherapy regimen needs to be modified and obviously I will  leave this up to Dr. Snell.    I reviewed her CA 15-3 given the fact that she has history of bilateral breast cancer. This number remains normal. Therefore what we see in the bones is not reflection of breast cancer. It is a reflection of her lung cancer. We do not need anymore tissue diagnosis on her at this point I would guess.      · DURING THE VISIT WITH THE PATIENT TODAY , PATIENT HAD FACE MASK, I HAD PROPER PROTECTIVE EQUIPMENT, AND I DID HAND HYGIENE WITH SOAP AND WATER BEFORE AND AFTER THE VISIT.                8/6/2022      CC:

## 2022-08-06 NOTE — PROGRESS NOTES
" LOS: 2 days   Patient Care Team:  Vandana Gastelum MD as PCP - General (Family Medicine)  Vandana Gastelum MD as Referring Physician (Family Medicine)  Salma Snell MD as Consulting Physician (Hematology and Oncology)  Patty Franklin MD as Consulting Physician (Radiation Oncology)    Chief Complaint: CHASITY    Subjective     History of Present Illness    Subjective:  Symptoms:  No shortness of breath, chest pain, chest pressure or anxiety.    Diet:  No nausea or vomiting.        History taken from: patient    Objective     Vital Sign Min/Max for last 24 hours  Temp  Min: 96.8 °F (36 °C)  Max: 97.2 °F (36.2 °C)   BP  Min: 106/75  Max: 138/95   Pulse  Min: 75  Max: 87   Resp  Min: 18  Max: 18   SpO2  Min: 94 %  Max: 96 %   No data recorded   No data recorded     Flowsheet Rows    Flowsheet Row First Filed Value   Admission Height 160 cm (63\") Documented at 08/03/2022 0652   Admission Weight 74.8 kg (165 lb) Documented at 08/03/2022 0652          I/O this shift:  In: 360 [P.O.:360]  Out: -   No intake/output data recorded.    Objective:  General Appearance:  Comfortable.    Vital signs: (most recent): Blood pressure 138/95, pulse 87, temperature 97.2 °F (36.2 °C), temperature source Oral, resp. rate 18, height 160 cm (63\"), weight 76.9 kg (169 lb 8.5 oz), SpO2 96 %, not currently breastfeeding.  Vital signs are normal.    Output: Producing urine.    HEENT: Normal HEENT exam.    Lungs:  Normal effort and normal respiratory rate.    Heart: Normal rate.  Regular rhythm.    Abdomen: Abdomen is soft.  Bowel sounds are normal.   There is no abdominal tenderness.     Extremities: Normal range of motion.  There is no dependent edema.    Pulses: Distal pulses are intact.    Neurological: Patient is alert and oriented to person, place and time.    Skin:  Warm and dry.              Results Review:     I reviewed the patient's new clinical results.    WBC WBC   Date Value Ref Range Status   08/05/2022 2.64 (L) 3.40 - " 10.80 10*3/mm3 Final   08/05/2022 2.72 (L) 3.40 - 10.80 10*3/mm3 Final   08/04/2022 1.16 (C) 3.40 - 10.80 10*3/mm3 Final      HGB Hemoglobin   Date Value Ref Range Status   08/05/2022 9.2 (L) 12.0 - 15.9 g/dL Final   08/05/2022 9.3 (L) 12.0 - 15.9 g/dL Final   08/04/2022 9.7 (L) 12.0 - 15.9 g/dL Final      HCT Hematocrit   Date Value Ref Range Status   08/05/2022 28.5 (L) 34.0 - 46.6 % Final   08/05/2022 28.6 (L) 34.0 - 46.6 % Final   08/04/2022 30.7 (L) 34.0 - 46.6 % Final      Platlets No results found for: LABPLAT   MCV MCV   Date Value Ref Range Status   08/05/2022 89.9 79.0 - 97.0 fL Final   08/05/2022 92.0 79.0 - 97.0 fL Final   08/04/2022 92.5 79.0 - 97.0 fL Final          Sodium Sodium   Date Value Ref Range Status   08/06/2022 140 136 - 145 mmol/L Final   08/05/2022 140 136 - 145 mmol/L Final   08/04/2022 133 (L) 136 - 145 mmol/L Final      Potassium Potassium   Date Value Ref Range Status   08/06/2022 3.3 (L) 3.5 - 5.2 mmol/L Final   08/05/2022 3.5 3.5 - 5.2 mmol/L Final   08/04/2022 3.9 3.5 - 5.2 mmol/L Final      Chloride Chloride   Date Value Ref Range Status   08/06/2022 110 (H) 98 - 107 mmol/L Final   08/05/2022 110 (H) 98 - 107 mmol/L Final   08/04/2022 104 98 - 107 mmol/L Final      CO2 CO2   Date Value Ref Range Status   08/06/2022 17.0 (L) 22.0 - 29.0 mmol/L Final   08/05/2022 17.0 (L) 22.0 - 29.0 mmol/L Final   08/04/2022 17.6 (L) 22.0 - 29.0 mmol/L Final      BUN BUN   Date Value Ref Range Status   08/06/2022 10 6 - 20 mg/dL Final   08/05/2022 11 6 - 20 mg/dL Final   08/04/2022 12 6 - 20 mg/dL Final      Creatinine Creatinine   Date Value Ref Range Status   08/06/2022 1.10 (H) 0.57 - 1.00 mg/dL Final   08/05/2022 1.18 (H) 0.57 - 1.00 mg/dL Final   08/04/2022 1.04 (H) 0.57 - 1.00 mg/dL Final      Calcium Calcium   Date Value Ref Range Status   08/06/2022 7.6 (L) 8.6 - 10.5 mg/dL Final   08/05/2022 7.1 (L) 8.6 - 10.5 mg/dL Final   08/04/2022 7.5 (L) 8.6 - 10.5 mg/dL Final      PO4 No results found  for: CAPO4   Albumin Albumin   Date Value Ref Range Status   08/06/2022 3.40 (L) 3.50 - 5.20 g/dL Final   08/04/2022 3.60 3.50 - 5.20 g/dL Final      Magnesium Magnesium   Date Value Ref Range Status   08/06/2022 1.8 1.6 - 2.6 mg/dL Final      Uric Acid Uric Acid   Date Value Ref Range Status   08/06/2022 3.8 2.4 - 5.7 mg/dL Final        Medication Review:   atorvastatin, 10 mg, Oral, Nightly  buPROPion XL, 150 mg, Oral, Daily  cefepime, 2 g, Intravenous, Q12H  filgrastim (NEUPOGEN) injection, 300 mcg, Subcutaneous, Q PM  FLUoxetine, 40 mg, Oral, Daily  folic acid, 1 mg, Oral, Daily  gabapentin, 600 mg, Oral, Q8H  letrozole, 2.5 mg, Oral, Daily  levothyroxine, 25 mcg, Oral, Daily  OLANZapine, 5 mg, Oral, Nightly  pantoprazole, 40 mg, Oral, QAM  propranolol LA, 60 mg, Oral, Daily  sodium bicarbonate, 1,300 mg, Oral, TID  sodium chloride, 10 mL, Intravenous, Q12H          Assessment & Plan       Ataxia    Essential hypertension    Primary adenocarcinoma of upper lobe of left lung (HCC)    History of bilateral breast cancer    Stage 3a chronic kidney disease (HCC)    Confusion    Chemotherapy-induced neutropenia (HCC)    Metabolic acidosis    Bony metastasis (HCC)    Hypopotassemia    Hypophosphatemia      Assessment & Plan  1.  Acute kidney injury on possible chronic kidney disease stage II/III her creatinine was baseline between 1 and 1.2 increased to 2.05 mg/dL etiology of worsening kidney function is multifactorial secondary to cisplatin use, Alimta, NSAID use. Keytruda has been associated with interstitial nephritis nephritis.  Creatinine trended down after holding Alimta , lisinopril and Lasix.  Creatinine now improved, today 1.18->1.10.  Potassium/phos low, started on replacement protocol since renal function recovered now.  2.  Leukopenia  3.  Metastatic lung cancer to the bone and adrenal gland  4.  Breast cancer on letrozole  5.  Hypertension, reasonably controlled  6.  DVT on Eliquis  7.  Hypothyroidism on  replacement therapy  8.  Anemia, hemoglobin 9.3.     Honorio Cervantes MD  08/06/22  15:38 EDT

## 2022-08-06 NOTE — PLAN OF CARE
Goal Outcome Evaluation:  Plan of Care Reviewed With: patient        Progress: no change  Outcome Evaluation: Pt A&Ox4, ambulating with x1 assist to bathroom, gait has improved, patient still not completely steady on feet. Pt refusing soft collar, states it makes her claustrophobic and increases her anxiety. Pt tolerating regular diet well, has IV antibiotics running through port. Port has good blood return, flushes well. VSS, PRN pain medicine given x2 doses overnight.

## 2022-08-06 NOTE — PROGRESS NOTES
Name: Holli Patel ADMIT: 8/3/2022   : 1965  PCP: Vandana Gastelum MD    MRN: [id number] LOS: 2 days   AGE/SEX: 57 y.o. female  ROOM: [facility]     Subjective   Subjective   Patient seems to be feeling much better.  Son at bedside.  Still with pain in right hip.    Review of Systems     Objective   Objective   Vital Signs  Temp:  [96.8 °F (36 °C)-98.8 °F (37.1 °C)] 97.2 °F (36.2 °C)  Heart Rate:  [75-87] 87  Resp:  [18] 18  BP: (103-118)/(70-79) 118/79  SpO2:  [94 %-97 %] 94 %  on   ;   Device (Oxygen Therapy): room air  Body mass index is 30.03 kg/m².  Physical Exam  Vitals and nursing note reviewed.   Constitutional:       General: She is not in acute distress.     Appearance: Normal appearance.   Neck:      Vascular: No JVD.      Trachea: No tracheal deviation.   Cardiovascular:      Rate and Rhythm: Normal rate and regular rhythm.      Heart sounds: Normal heart sounds.   Pulmonary:      Effort: Pulmonary effort is normal. No respiratory distress.      Breath sounds: Normal breath sounds.   Abdominal:      General: Bowel sounds are normal.      Palpations: Abdomen is soft.      Tenderness: There is no abdominal tenderness.   Skin:     General: Skin is warm and dry.   Neurological:      General: No focal deficit present.      Mental Status: She is alert and oriented to person, place, and time.   Psychiatric:         Behavior: Behavior normal. Behavior is cooperative.         Results Review     I reviewed the patient's new clinical results.  Results from last 7 days   Lab Units 22  0443 22  0356 22  0806 22  1125   WBC 10*3/mm3 2.72*  2.64* 1.16* 0.68* 0.51*   HEMOGLOBIN g/dL 9.3*  9.2* 9.7* 9.6* 10.4*   PLATELETS 10*3/mm3 187  180 183 169 193     Results from last 7 days   Lab Units 22  0858 22  0443 22  0356 22  0806   SODIUM mmol/L 140 140 133* 133*   POTASSIUM mmol/L 3.3* 3.5 3.9 4.5   CHLORIDE mmol/L 110* 110* 104 104   CO2 mmol/L 17.0* 17.0*  17.6* 19.0*   BUN mg/dL 10 11 12 11   CREATININE mg/dL 1.10* 1.18* 1.04* 1.12*   GLUCOSE mg/dL 93 78 108* 101*   EGFR mL/min/1.73 58.0* 53.3* 62.0 56.8*     Results from last 7 days   Lab Units 08/06/22  0858 08/04/22  0356 08/03/22  0806 08/02/22  1128   ALBUMIN g/dL 3.40* 3.60 3.70 3.80   BILIRUBIN mg/dL  --  0.4 0.4 0.2   ALK PHOS U/L  --  129* 119* 128*   AST (SGOT) U/L  --  26 16 21   ALT (SGPT) U/L  --  28 15 20     Results from last 7 days   Lab Units 08/06/22  0858 08/05/22  0443 08/04/22  0356 08/03/22  0806 08/02/22  1128 08/02/22  1120   CALCIUM mg/dL 7.6* 7.1* 7.5* 7.5* 7.8* 7.8*   ALBUMIN g/dL 3.40*  --  3.60 3.70 3.80 3.90  2.9   MAGNESIUM mg/dL 1.8  --   --   --   --  1.8   PHOSPHORUS mg/dL 1.1*  --   --   --   --  1.2*     Results from last 7 days   Lab Units 08/03/22  0806   PROCALCITONIN ng/mL 0.22   LACTATE mmol/L 0.9     Hemoglobin A1C   Date/Time Value Ref Range Status   08/04/2022 0356 5.20 4.80 - 5.60 % Final     Glucose   Date/Time Value Ref Range Status   08/04/2022 0553 109 70 - 130 mg/dL Final     Comment:     Meter: [id number] : [id number] Rdz Aldastity NA   08/03/2022 2342 104 70 - 130 mg/dL Final     Comment:     Meter: [id number] : [id number] Rdz lAdastity NA   08/03/2022 1747 204 (H) 70 - 130 mg/dL Final     Comment:     Meter: [id number] : [id number] Kayla Peace NA   08/03/2022 1746 213 (H) 70 - 130 mg/dL Final     Comment:     Meter: [id number] : [id number] Kayla EDMONDSON       Scheduled Medications  atorvastatin, 10 mg, Oral, Nightly  buPROPion XL, 150 mg, Oral, Daily  cefepime, 2 g, Intravenous, Q12H  filgrastim (NEUPOGEN) injection, 300 mcg, Subcutaneous, Q PM  FLUoxetine, 40 mg, Oral, Daily  folic acid, 1 mg, Oral, Daily  gabapentin, 600 mg, Oral, Q8H  letrozole, 2.5 mg, Oral, Daily  levothyroxine, 25 mcg, Oral, Daily  OLANZapine, 5 mg, Oral, Nightly  pantoprazole, 40 mg, Oral, QAM  propranolol LA, 60 mg, Oral, Daily  sodium bicarbonate, 1,300 mg,  Oral, TID  sodium chloride, 10 mL, Intravenous, Q12H    Infusions   Diet  Diet Regular       Assessment/Plan     Active Hospital Problems    Diagnosis  POA   • **Ataxia [R27.0]  Yes   • Hypopotassemia [E87.6]  No   • Hypophosphatemia [E83.39]  Yes   • Confusion [R41.0]  Yes   • Chemotherapy-induced neutropenia (HCC) [D70.1, T45.1X5A]  Yes   • Metabolic acidosis [E87.2]  Yes   • Bony metastasis (HCC) [C79.51]  Yes   • Stage 3a chronic kidney disease (HCC) [N18.31]  Yes   • History of bilateral breast cancer [Z85.3]  Not Applicable   • Primary adenocarcinoma of upper lobe of left lung (HCC) [C34.12]  Yes   • Essential hypertension [I10]  Yes      Resolved Hospital Problems   No resolved problems to display.       57 y.o. female admitted with Ataxia.    Note plans to initiate palliative radiation treatments to C-spine and sacral area.  She is afebrile and finishing short course of antibiotics for neutropenic fever.  She did receive dose of Neupogen and has had improvement in WBC.  No CBC ordered for this morning.  Reassess labs tomorrow.  Nephrology monitoring renal function and replacing her electrolytes.  Still mildly acidotic.  Renal ultrasound was normal.  She is working with physical therapy.  Gait and balance much improved.  Uncertain of etiology of her ataxic spell at admission.  No evidence of stroke.  Patient still not wearing cervical collar.  Neurosurgery following.  Offered epidural steroid injection but patient declined.  As stated she has been seen by radiation oncology who is planning palliative radiation treatments to start on Monday.    Note plans for antibiotics through 8/6 per infectious disease.  She has been afebrile.      · SCDs for DVT prophylaxis.  · Full code.  · Discussed with patient, family and nursing staff.  · Anticipate discharge home with family timing yet to be determined.      Toby Jade MD  [facility]  08/06/22  10:19 EDT

## 2022-08-07 LAB
ALBUMIN SERPL-MCNC: 3.8 G/DL (ref 3.5–5.2)
ALBUMIN/GLOB SERPL: 1.3 G/DL
ALP SERPL-CCNC: 183 U/L (ref 39–117)
ALT SERPL W P-5'-P-CCNC: 36 U/L (ref 1–33)
ANION GAP SERPL CALCULATED.3IONS-SCNC: 11 MMOL/L (ref 5–15)
AST SERPL-CCNC: 32 U/L (ref 1–32)
BASOPHILS # BLD MANUAL: 0.29 10*3/MM3 (ref 0–0.2)
BASOPHILS NFR BLD MANUAL: 1 % (ref 0–1.5)
BILIRUB SERPL-MCNC: <0.2 MG/DL (ref 0–1.2)
BUN SERPL-MCNC: 7 MG/DL (ref 6–20)
BUN/CREAT SERPL: 7.1 (ref 7–25)
CALCIUM SPEC-SCNC: 7.1 MG/DL (ref 8.6–10.5)
CHLORIDE SERPL-SCNC: 110 MMOL/L (ref 98–107)
CO2 SERPL-SCNC: 19 MMOL/L (ref 22–29)
CREAT SERPL-MCNC: 0.98 MG/DL (ref 0.57–1)
DEPRECATED RDW RBC AUTO: 38.8 FL (ref 37–54)
EGFRCR SERPLBLD CKD-EPI 2021: 66.6 ML/MIN/1.73
EOSINOPHIL # BLD MANUAL: 0.29 10*3/MM3 (ref 0–0.4)
EOSINOPHIL NFR BLD MANUAL: 1 % (ref 0.3–6.2)
ERYTHROCYTE [DISTWIDTH] IN BLOOD BY AUTOMATED COUNT: 12.2 % (ref 12.3–15.4)
GLOBULIN UR ELPH-MCNC: 3 GM/DL
GLUCOSE SERPL-MCNC: 90 MG/DL (ref 65–99)
HCT VFR BLD AUTO: 29.3 % (ref 34–46.6)
HGB BLD-MCNC: 9.6 G/DL (ref 12–15.9)
HYPOCHROMIA BLD QL: ABNORMAL
LYMPHOCYTES # BLD MANUAL: 1.67 10*3/MM3 (ref 0.7–3.1)
LYMPHOCYTES NFR BLD MANUAL: 2.9 % (ref 5–12)
MCH RBC QN AUTO: 29.5 PG (ref 26.6–33)
MCHC RBC AUTO-ENTMCNC: 32.8 G/DL (ref 31.5–35.7)
MCV RBC AUTO: 90.2 FL (ref 79–97)
METAMYELOCYTES NFR BLD MANUAL: 2.9 % (ref 0–0)
MONOCYTES # BLD: 0.85 10*3/MM3 (ref 0.1–0.9)
NEUTROPHILS # BLD AUTO: 25.41 10*3/MM3 (ref 1.7–7)
NEUTROPHILS NFR BLD MANUAL: 86.7 % (ref 42.7–76)
NRBC BLD AUTO-RTO: 0.3 /100 WBC (ref 0–0.2)
PLAT MORPH BLD: NORMAL
PLATELET # BLD AUTO: 209 10*3/MM3 (ref 140–450)
PMV BLD AUTO: 10 FL (ref 6–12)
POTASSIUM SERPL-SCNC: 4.4 MMOL/L (ref 3.5–5.2)
PROT SERPL-MCNC: 6.8 G/DL (ref 6–8.5)
RBC # BLD AUTO: 3.25 10*6/MM3 (ref 3.77–5.28)
SODIUM SERPL-SCNC: 140 MMOL/L (ref 136–145)
VARIANT LYMPHS NFR BLD MANUAL: 5.7 % (ref 19.6–45.3)
WBC MORPH BLD: NORMAL
WBC NRBC COR # BLD: 29.31 10*3/MM3 (ref 3.4–10.8)

## 2022-08-07 PROCEDURE — 25010000002 ENOXAPARIN PER 10 MG: Performed by: INTERNAL MEDICINE

## 2022-08-07 PROCEDURE — 85007 BL SMEAR W/DIFF WBC COUNT: CPT | Performed by: INTERNAL MEDICINE

## 2022-08-07 PROCEDURE — 99233 SBSQ HOSP IP/OBS HIGH 50: CPT | Performed by: INTERNAL MEDICINE

## 2022-08-07 PROCEDURE — 85025 COMPLETE CBC W/AUTO DIFF WBC: CPT | Performed by: INTERNAL MEDICINE

## 2022-08-07 PROCEDURE — 80053 COMPREHEN METABOLIC PANEL: CPT | Performed by: INTERNAL MEDICINE

## 2022-08-07 RX ORDER — ENOXAPARIN SODIUM 100 MG/ML
40 INJECTION SUBCUTANEOUS EVERY 24 HOURS
Status: DISCONTINUED | OUTPATIENT
Start: 2022-08-07 | End: 2022-08-09 | Stop reason: HOSPADM

## 2022-08-07 RX ORDER — CALCITRIOL 0.25 UG/1
1 CAPSULE, LIQUID FILLED ORAL DAILY
Status: DISCONTINUED | OUTPATIENT
Start: 2022-08-07 | End: 2022-08-09 | Stop reason: HOSPADM

## 2022-08-07 RX ORDER — GABAPENTIN 300 MG/1
300 CAPSULE ORAL EVERY 8 HOURS SCHEDULED
Status: DISCONTINUED | OUTPATIENT
Start: 2022-08-07 | End: 2022-08-09 | Stop reason: HOSPADM

## 2022-08-07 RX ADMIN — LETROZOLE 2.5 MG: 2.5 TABLET ORAL at 08:38

## 2022-08-07 RX ADMIN — ENOXAPARIN SODIUM 40 MG: 100 INJECTION SUBCUTANEOUS at 09:59

## 2022-08-07 RX ADMIN — GABAPENTIN 300 MG: 300 CAPSULE ORAL at 21:25

## 2022-08-07 RX ADMIN — OXYCODONE AND ACETAMINOPHEN 1 TABLET: 5; 325 TABLET ORAL at 14:49

## 2022-08-07 RX ADMIN — CALCITRIOL 1 MCG: 0.25 CAPSULE ORAL at 16:26

## 2022-08-07 RX ADMIN — OXYCODONE AND ACETAMINOPHEN 1 TABLET: 5; 325 TABLET ORAL at 08:38

## 2022-08-07 RX ADMIN — Medication 10 ML: at 21:24

## 2022-08-07 RX ADMIN — LEVOTHYROXINE SODIUM 25 MCG: 0.03 TABLET ORAL at 05:44

## 2022-08-07 RX ADMIN — SODIUM BICARBONATE 1300 MG: 650 TABLET ORAL at 16:26

## 2022-08-07 RX ADMIN — FLUOXETINE HYDROCHLORIDE 40 MG: 20 CAPSULE ORAL at 08:38

## 2022-08-07 RX ADMIN — PANTOPRAZOLE SODIUM 40 MG: 40 TABLET, DELAYED RELEASE ORAL at 05:44

## 2022-08-07 RX ADMIN — OXYCODONE AND ACETAMINOPHEN 1 TABLET: 5; 325 TABLET ORAL at 00:16

## 2022-08-07 RX ADMIN — BUPROPION HYDROCHLORIDE 150 MG: 150 TABLET, EXTENDED RELEASE ORAL at 08:38

## 2022-08-07 RX ADMIN — OXYCODONE AND ACETAMINOPHEN 1 TABLET: 5; 325 TABLET ORAL at 21:24

## 2022-08-07 RX ADMIN — PROPRANOLOL HYDROCHLORIDE 60 MG: 60 CAPSULE, EXTENDED RELEASE ORAL at 08:38

## 2022-08-07 RX ADMIN — SODIUM BICARBONATE 1300 MG: 650 TABLET ORAL at 08:38

## 2022-08-07 RX ADMIN — Medication 1 MG: at 08:38

## 2022-08-07 RX ADMIN — SODIUM BICARBONATE 1300 MG: 650 TABLET ORAL at 21:25

## 2022-08-07 RX ADMIN — GABAPENTIN 300 MG: 300 CAPSULE ORAL at 13:59

## 2022-08-07 RX ADMIN — Medication 10 ML: at 08:39

## 2022-08-07 RX ADMIN — ATORVASTATIN CALCIUM 10 MG: 20 TABLET, FILM COATED ORAL at 21:25

## 2022-08-07 RX ADMIN — GABAPENTIN 600 MG: 300 CAPSULE ORAL at 05:44

## 2022-08-07 NOTE — PROGRESS NOTES
Name: Holli Patel ADMIT: 8/3/2022   : 1965  PCP: Vandana Gastelum MD    MRN: [id number] LOS: 3 days   AGE/SEX: 57 y.o. female  ROOM: [facility]/     Subjective   Subjective    Had confusion overnight. Improved now. She denies any new complaints. She only has pain when she walks.    Review of Systems   Constitutional: Negative for fever.   Respiratory: Negative for cough and shortness of breath.    Cardiovascular: Negative for chest pain.   Gastrointestinal: Negative for abdominal pain, diarrhea, nausea and vomiting.   Genitourinary: Negative for dysuria.   Neurological: Negative for headaches.      Objective   Objective   Vital Signs  Temp:  [96.6 °F (35.9 °C)-99.1 °F (37.3 °C)] 99.1 °F (37.3 °C)  Heart Rate:  [83-95] 90  Resp:  [18] 18  BP: ()/(66-93) 93/66  SpO2:  [93 %-97 %] 94 %  on   ;   Device (Oxygen Therapy): room air  Body mass index is 30.03 kg/m².     Physical Exam  Vitals and nursing note reviewed.   Constitutional:       General: She is not in acute distress.     Appearance: Normal appearance.   Neck:      Vascular: No JVD.      Trachea: No tracheal deviation.   Cardiovascular:      Rate and Rhythm: Normal rate and regular rhythm.      Heart sounds: Normal heart sounds.   Pulmonary:      Effort: Pulmonary effort is normal. No respiratory distress.      Breath sounds: Normal breath sounds.   Abdominal:      General: Bowel sounds are normal.      Palpations: Abdomen is soft.      Tenderness: There is no abdominal tenderness.   Skin:     General: Skin is warm and dry.   Neurological:      General: No focal deficit present.      Mental Status: She is alert and oriented to person, place, and time.      Cranial Nerves: No dysarthria or facial asymmetry.      Motor: No weakness.      Comments: Oriented to self, hospital, year, situation.   Psychiatric:         Behavior: Behavior normal. Behavior is cooperative.     Results Review     I reviewed the patient's new clinical results.  Results  from last 7 days   Lab Units 08/07/22 0445 08/06/22  1456 08/05/22  0443 08/04/22  0356   WBC 10*3/mm3 29.31* 17.90* 2.72*  2.64* 1.16*   HEMOGLOBIN g/dL 9.6* 10.0* 9.3*  9.2* 9.7*   PLATELETS 10*3/mm3 209 226 187  180 183     Results from last 7 days   Lab Units 08/07/22 0445 08/06/22  1456 08/06/22  0858 08/05/22  0443   SODIUM mmol/L 140 143 140 140   POTASSIUM mmol/L 4.4 4.1 3.3* 3.5   CHLORIDE mmol/L 110* 111* 110* 110*   CO2 mmol/L 19.0* 19.0* 17.0* 17.0*   BUN mg/dL 7 10 10 11   CREATININE mg/dL 0.98 1.19* 1.10* 1.18*   GLUCOSE mg/dL 90 85 93 78   EGFR mL/min/1.73 66.6 52.8* 58.0* 53.3*     Results from last 7 days   Lab Units 08/07/22 0445 08/06/22  1456 08/06/22  0858 08/04/22  0356 08/03/22  0806   ALBUMIN g/dL 3.80 3.60 3.40* 3.60 3.70   BILIRUBIN mg/dL <0.2 <0.2  --  0.4 0.4   ALK PHOS U/L 183* 149*  --  129* 119*   AST (SGOT) U/L 32 33*  --  26 16   ALT (SGPT) U/L 36* 36*  --  28 15     Results from last 7 days   Lab Units 08/07/22 0445 08/06/22  1456 08/06/22  0858 08/05/22  0443 08/04/22  0356 08/02/22  1128 08/02/22  1120   CALCIUM mg/dL 7.1* 7.3* 7.6* 7.1* 7.5*   < > 7.8*   ALBUMIN g/dL 3.80 3.60 3.40*  --  3.60   < > 3.90  2.9   MAGNESIUM mg/dL  --   --  1.8  --   --   --  1.8   PHOSPHORUS mg/dL  --   --  1.1*  --   --   --  1.2*    < > = values in this interval not displayed.     Results from last 7 days   Lab Units 08/03/22  0806   PROCALCITONIN ng/mL 0.22   LACTATE mmol/L 0.9     No results found for: HGBA1C, POCGLU    Scheduled Meds  atorvastatin, 10 mg, Oral, Nightly  buPROPion XL, 150 mg, Oral, Daily  calcitriol, 1 mcg, Oral, Daily  enoxaparin, 40 mg, Subcutaneous, Q24H  FLUoxetine, 40 mg, Oral, Daily  folic acid, 1 mg, Oral, Daily  gabapentin, 300 mg, Oral, Q8H  letrozole, 2.5 mg, Oral, Daily  levothyroxine, 25 mcg, Oral, Daily  pantoprazole, 40 mg, Oral, QAM  propranolol LA, 60 mg, Oral, Daily  sodium bicarbonate, 1,300 mg, Oral, TID  sodium chloride, 10 mL, Intravenous,  Q12H    Continuous Infusions   PRN Meds  •  acetaminophen  •  heparin  •  ondansetron **OR** ondansetron  •  oxyCODONE-acetaminophen  •  potassium chloride **OR** potassium chloride **OR** potassium chloride  •  potassium phosphate infusion greater than 15 mMoles **OR** potassium phosphate infusion greater than 15 mMoles **OR** potassium phosphate **OR** sodium phosphate IVPB **OR** sodium phosphate IVPB  •  prochlorperazine  •  Access VAD **AND** sodium chloride  •  sodium chloride  •  sodium chloride  •  SUMAtriptan     I have personally reviewed:  [x]  Laboratory   [x]  Microbiology   [x]  Radiology   []  EKG/Telemetry   []  Cardiology/Vascular   []  Pathology   []  Records      Diet  Diet Regular            Assessment/Plan     Active Hospital Problems    Diagnosis  POA   • **Ataxia [R27.0]  Yes   • Hypopotassemia [E87.6]  No   • Hypophosphatemia [E83.39]  Yes   • Confusion [R41.0]  Yes   • Chemotherapy-induced neutropenia (HCC) [D70.1, T45.1X5A]  Yes   • Metabolic acidosis [E87.2]  Yes   • Bony metastasis (HCC) [C79.51]  Yes   • Stage 3a chronic kidney disease (HCC) [N18.31]  Yes   • History of bilateral breast cancer [Z85.3]  Not Applicable   • Primary adenocarcinoma of upper lobe of left lung (HCC) [C34.12]  Yes   • Essential hypertension [I10]  Yes      Resolved Hospital Problems   No resolved problems to display.       57 y.o. female admitted with Ataxia. History of breast and lung cancer and extensive bony mets.    Fever and neutropenia resolved. Had antibiotics (finished 8/6/2022) per ID. WBC up from Neupogen.    Confusion: Improved now. MRI brain no mets. Oncology has adjusted medications. Continue to monitor.    Radiation therapy to start tomorrow. Oncology is postponing chemo    · SCDs for DVT prophylaxis.  · Full code.  · Discussed with patient and nursing staff.  · Anticipate discharge home with family tomorrow.    Daniel Lovett MD  [facility]  08/07/22  14:40 EDT

## 2022-08-07 NOTE — PROGRESS NOTES
" LOS: 3 days   Patient Care Team:  Vandana Gastelum MD as PCP - General (Family Medicine)  Vandana Gastelum MD as Referring Physician (Family Medicine)  Salma Snell MD as Consulting Physician (Hematology and Oncology)  Patty Franklin MD as Consulting Physician (Radiation Oncology)    Chief Complaint: CHASITY    Subjective     History of Present Illness  Pt without any new complaints    Subjective:  Symptoms:  No shortness of breath, chest pain, chest pressure or anxiety.    Diet:  No nausea or vomiting.        History taken from: patient    Objective     Vital Sign Min/Max for last 24 hours  Temp  Min: 96.6 °F (35.9 °C)  Max: 99.1 °F (37.3 °C)   BP  Min: 93/66  Max: 139/91   Pulse  Min: 83  Max: 95   Resp  Min: 18  Max: 18   SpO2  Min: 93 %  Max: 97 %   No data recorded   No data recorded     Flowsheet Rows    Flowsheet Row First Filed Value   Admission Height 160 cm (63\") Documented at 08/03/2022 0652   Admission Weight 74.8 kg (165 lb) Documented at 08/03/2022 0652          I/O this shift:  In: 480 [P.O.:480]  Out: -   I/O last 3 completed shifts:  In: 600 [P.O.:600]  Out: -     Objective:  General Appearance:  Comfortable.    Vital signs: (most recent): Blood pressure 93/66, pulse 90, temperature 99.1 °F (37.3 °C), temperature source Oral, resp. rate 18, height 160 cm (63\"), weight 76.9 kg (169 lb 8.5 oz), SpO2 94 %, not currently breastfeeding.  Vital signs are normal.    Output: Producing urine.    HEENT: Normal HEENT exam.    Lungs:  Normal effort and normal respiratory rate.    Heart: Normal rate.  Regular rhythm.    Abdomen: Abdomen is soft.  Bowel sounds are normal.   There is no abdominal tenderness.     Extremities: Normal range of motion.  There is no dependent edema.    Pulses: Distal pulses are intact.    Neurological: Patient is alert and oriented to person, place and time.    Skin:  Warm and dry.              Results Review:     I reviewed the patient's new clinical results.    WBC WBC "   Date Value Ref Range Status   08/07/2022 29.31 (H) 3.40 - 10.80 10*3/mm3 Final   08/06/2022 17.90 (H) 3.40 - 10.80 10*3/mm3 Final   08/05/2022 2.64 (L) 3.40 - 10.80 10*3/mm3 Final   08/05/2022 2.72 (L) 3.40 - 10.80 10*3/mm3 Final      HGB Hemoglobin   Date Value Ref Range Status   08/07/2022 9.6 (L) 12.0 - 15.9 g/dL Final   08/06/2022 10.0 (L) 12.0 - 15.9 g/dL Final   08/05/2022 9.2 (L) 12.0 - 15.9 g/dL Final   08/05/2022 9.3 (L) 12.0 - 15.9 g/dL Final      HCT Hematocrit   Date Value Ref Range Status   08/07/2022 29.3 (L) 34.0 - 46.6 % Final   08/06/2022 31.0 (L) 34.0 - 46.6 % Final   08/05/2022 28.5 (L) 34.0 - 46.6 % Final   08/05/2022 28.6 (L) 34.0 - 46.6 % Final      Platlets No results found for: LABPLAT   MCV MCV   Date Value Ref Range Status   08/07/2022 90.2 79.0 - 97.0 fL Final   08/06/2022 92.3 79.0 - 97.0 fL Final   08/05/2022 89.9 79.0 - 97.0 fL Final   08/05/2022 92.0 79.0 - 97.0 fL Final          Sodium Sodium   Date Value Ref Range Status   08/07/2022 140 136 - 145 mmol/L Final   08/06/2022 143 136 - 145 mmol/L Final   08/06/2022 140 136 - 145 mmol/L Final   08/05/2022 140 136 - 145 mmol/L Final      Potassium Potassium   Date Value Ref Range Status   08/07/2022 4.4 3.5 - 5.2 mmol/L Final   08/06/2022 4.1 3.5 - 5.2 mmol/L Final   08/06/2022 3.3 (L) 3.5 - 5.2 mmol/L Final   08/05/2022 3.5 3.5 - 5.2 mmol/L Final      Chloride Chloride   Date Value Ref Range Status   08/07/2022 110 (H) 98 - 107 mmol/L Final   08/06/2022 111 (H) 98 - 107 mmol/L Final   08/06/2022 110 (H) 98 - 107 mmol/L Final   08/05/2022 110 (H) 98 - 107 mmol/L Final      CO2 CO2   Date Value Ref Range Status   08/07/2022 19.0 (L) 22.0 - 29.0 mmol/L Final   08/06/2022 19.0 (L) 22.0 - 29.0 mmol/L Final   08/06/2022 17.0 (L) 22.0 - 29.0 mmol/L Final   08/05/2022 17.0 (L) 22.0 - 29.0 mmol/L Final      BUN BUN   Date Value Ref Range Status   08/07/2022 7 6 - 20 mg/dL Final   08/06/2022 10 6 - 20 mg/dL Final   08/06/2022 10 6 - 20 mg/dL  Final   08/05/2022 11 6 - 20 mg/dL Final      Creatinine Creatinine   Date Value Ref Range Status   08/07/2022 0.98 0.57 - 1.00 mg/dL Final   08/06/2022 1.19 (H) 0.57 - 1.00 mg/dL Final   08/06/2022 1.10 (H) 0.57 - 1.00 mg/dL Final   08/05/2022 1.18 (H) 0.57 - 1.00 mg/dL Final      Calcium Calcium   Date Value Ref Range Status   08/07/2022 7.1 (L) 8.6 - 10.5 mg/dL Final   08/06/2022 7.3 (L) 8.6 - 10.5 mg/dL Final   08/06/2022 7.6 (L) 8.6 - 10.5 mg/dL Final   08/05/2022 7.1 (L) 8.6 - 10.5 mg/dL Final      PO4 No results found for: CAPO4   Albumin Albumin   Date Value Ref Range Status   08/07/2022 3.80 3.50 - 5.20 g/dL Final   08/06/2022 3.60 3.50 - 5.20 g/dL Final   08/06/2022 3.40 (L) 3.50 - 5.20 g/dL Final      Magnesium Magnesium   Date Value Ref Range Status   08/06/2022 1.8 1.6 - 2.6 mg/dL Final      Uric Acid Uric Acid   Date Value Ref Range Status   08/06/2022 3.8 2.4 - 5.7 mg/dL Final        Medication Review:   atorvastatin, 10 mg, Oral, Nightly  buPROPion XL, 150 mg, Oral, Daily  calcitriol, 1 mcg, Oral, Daily  enoxaparin, 40 mg, Subcutaneous, Q24H  FLUoxetine, 40 mg, Oral, Daily  folic acid, 1 mg, Oral, Daily  gabapentin, 300 mg, Oral, Q8H  letrozole, 2.5 mg, Oral, Daily  levothyroxine, 25 mcg, Oral, Daily  pantoprazole, 40 mg, Oral, QAM  propranolol LA, 60 mg, Oral, Daily  sodium bicarbonate, 1,300 mg, Oral, TID  sodium chloride, 10 mL, Intravenous, Q12H          Assessment & Plan       Ataxia    Essential hypertension    Primary adenocarcinoma of upper lobe of left lung (HCC)    History of bilateral breast cancer    Stage 3a chronic kidney disease (HCC)    Confusion    Chemotherapy-induced neutropenia (HCC)    Metabolic acidosis    Bony metastasis (HCC)    Hypopotassemia    Hypophosphatemia      Assessment & Plan  1.  Acute kidney injury on possible chronic kidney disease stage II/III her creatinine was baseline between 1 and 1.2 increased to 2.05 mg/dL etiology of worsening kidney function is  multifactorial secondary to cisplatin use, Alimta, NSAID use. Keytruda has been associated with interstitial nephritis nephritis.  Creatinine trended down after holding Alimta , lisinopril and Lasix.  Creatinine now improved, today 1.18->1.10->0.98.  Potassium/phos low, started on replacement protocol since renal function recovered now.  Will sign off.  Call if any changes.  2.  Leukopenia  3.  Metastatic lung cancer to the bone and adrenal gland  4.  Breast cancer on letrozole  5.  Hypertension, reasonably controlled  6.  DVT on Eliquis  7.  Hypothyroidism on replacement therapy  8.  Anemia, hemoglobin 9.3.     Honorio Cervantes MD  08/07/22  14:58 EDT

## 2022-08-07 NOTE — PLAN OF CARE
Problem: Adult Inpatient Plan of Care  Goal: Plan of Care Review  Outcome: Ongoing, Progressing  Flowsheets (Taken 8/7/2022 1633)  Progress: improving  Plan of Care Reviewed With: patient  Outcome Evaluation: VSS, room air, up ad francis, ambulated in [place], alert and oriented, no signs of confusion this shift, pain meds given as ordered, poor appeitite.  Goal: Patient-Specific Goal (Individualized)  Outcome: Ongoing, Progressing  Goal: Absence of Hospital-Acquired Illness or Injury  Outcome: Ongoing, Progressing  Intervention: Identify and Manage Fall Risk  Recent Flowsheet Documentation  Taken 8/7/2022 1617 by Génesis Ceron RN  Safety Promotion/Fall Prevention:   assistive device/personal items within reach   clutter free environment maintained   nonskid shoes/slippers when out of bed   room organization consistent   safety round/check completed  Taken 8/7/2022 1400 by Génesis Ceron RN  Safety Promotion/Fall Prevention:   assistive device/personal items within reach   clutter free environment maintained   nonskid shoes/slippers when out of bed   room organization consistent   safety round/check completed  Taken 8/7/2022 1203 by Génesis Ceron RN  Safety Promotion/Fall Prevention:   assistive device/personal items within reach   clutter free environment maintained   nonskid shoes/slippers when out of bed   room organization consistent   safety round/check completed  Taken 8/7/2022 1000 by Génesis Ceron RN  Safety Promotion/Fall Prevention:   activity supervised   assistive device/personal items within reach   clutter free environment maintained   fall prevention program maintained   nonskid shoes/slippers when out of bed   room organization consistent   safety round/check completed  Taken 8/7/2022 0839 by Génesis Ceron RN  Safety Promotion/Fall Prevention:   activity supervised   assistive device/personal items within reach   clutter free environment maintained   fall prevention program  maintained   nonskid shoes/slippers when out of bed   safety round/check completed   room organization consistent  Intervention: Prevent Skin Injury  Recent Flowsheet Documentation  Taken 8/7/2022 1617 by Génesis Ceron RN  Body Position: supine  Taken 8/7/2022 1400 by Génesis Ceron RN  Body Position: supine  Taken 8/7/2022 1203 by Génesis Ceron RN  Body Position:   left   side-lying   position changed independently  Taken 8/7/2022 1000 by Génesis Ceron RN  Body Position:   left   side-lying   position changed independently  Taken 8/7/2022 0839 by Génesis Ceron RN  Body Position: dangle, side of bed  Intervention: Prevent and Manage VTE (Venous Thromboembolism) Risk  Recent Flowsheet Documentation  Taken 8/7/2022 1617 by Génesis Ceron RN  Activity Management: activity adjusted per tolerance  Taken 8/7/2022 1400 by Génesis Ceron RN  Activity Management:   activity adjusted per tolerance   ambulated outside room  Taken 8/7/2022 1203 by Génesis Ceron RN  Activity Management: activity adjusted per tolerance  Taken 8/7/2022 1000 by Génesis Ceron RN  Activity Management: activity adjusted per tolerance  Taken 8/7/2022 0839 by Génesis Ceron RN  Activity Management: activity adjusted per tolerance  Goal: Optimal Comfort and Wellbeing  Outcome: Ongoing, Progressing  Goal: Readiness for Transition of Care  Outcome: Ongoing, Progressing     Problem: Fall Injury Risk  Goal: Absence of Fall and Fall-Related Injury  Outcome: Ongoing, Progressing  Intervention: Promote Injury-Free Environment  Recent Flowsheet Documentation  Taken 8/7/2022 1617 by Génesis Ceron RN  Safety Promotion/Fall Prevention:   assistive device/personal items within reach   clutter free environment maintained   nonskid shoes/slippers when out of bed   room organization consistent   safety round/check completed  Taken 8/7/2022 1400 by Génesis Ceron RN  Safety Promotion/Fall Prevention:    assistive device/personal items within reach   clutter free environment maintained   nonskid shoes/slippers when out of bed   room organization consistent   safety round/check completed  Taken 8/7/2022 1203 by Génesis Ceron RN  Safety Promotion/Fall Prevention:   assistive device/personal items within reach   clutter free environment maintained   nonskid shoes/slippers when out of bed   room organization consistent   safety round/check completed  Taken 8/7/2022 1000 by Génesis Ceron RN  Safety Promotion/Fall Prevention:   activity supervised   assistive device/personal items within reach   clutter free environment maintained   fall prevention program maintained   nonskid shoes/slippers when out of bed   room organization consistent   safety round/check completed  Taken 8/7/2022 0839 by Génesis Ceron RN  Safety Promotion/Fall Prevention:   activity supervised   assistive device/personal items within reach   clutter free environment maintained   fall prevention program maintained   nonskid shoes/slippers when out of bed   safety round/check completed   room organization consistent   Goal Outcome Evaluation:  Plan of Care Reviewed With: patient        Progress: improving  Outcome Evaluation: VSS, room air, up ad francis, ambulated in [place], alert and oriented, no signs of confusion this shift, pain meds given as ordered, poor appeitite.

## 2022-08-07 NOTE — PLAN OF CARE
Goal Outcome Evaluation:  Plan of Care Reviewed With: patient, spouse        Progress: declining  Outcome Evaluation: Pt and spouse very concerned about confusion had during the night, spouse said it was just as bad as when he brought her in to hospital.  He states that gait has improved. Spouse stayed with her till 5am--had to run errands--wants to be back in to talke to doctors.  Pt hasd increased anxiety with confusion--told nurse that she takes two of the xanax's at night at home.  Pt finally able to sleep a little. Pain meds given once for rectal/buttock pain.  Labs drawn from port-good blood return. Temp max 100 F over night.

## 2022-08-07 NOTE — PROGRESS NOTES
Subjective       History of Present Illness    On 08/04/2022, I had the opportunity to see in consultation this 57-year-old female who has history of Stage IV lung cancer, undergoing chemotherapy by my partner, Dr. Snell. She also has history of bilateral breast cancer. The breast cancer is not metastatic. In any event, the patient was admitted to the hospital with confusion, fever and she was found to have neutropenia with sepsis. She was placed on antibiotics. The patient has slowly recovered today to the point that she is making sense in regard to her language. She is having some appetite and she has not had any further fever. She denies any sores in her mouth, cough, sputum production, shortness of breath, pleuritic pain or hemoptysis. Her appetite has been very dramatically decreased for the last week. She has had some nausea. She has had some vomiting, occasional loose bowel activity. No abdominal cramping, no passage of blood in the stool. Urination in volume decreased substantially before she came to the hospital. She has not had any rash. She was very confused for a few days, now she feels mentally much better.      Her vital signs have remained stable and she has been seen by hospitalist and Infectious Disease.    On 08/05/2022 now that the patient is more awake and alert she is now rating a pain in the sacrum on the right side that she attributes to sciatica. The pain is intensity 7 out of 10 propagated into the right lower extremity. It is throbbing in nature and continues. She has not too much pain in her spine in spite of having significant metastatic disease. She has no headache. Her mind is completely clear today. She has been eating okay. Her bowel activity is ongoing. Urination is normal. She has no cough or shortness of breath. She still has fatigue.  On 08/06/2022 the patient still has pain in the right buttock propagated into the right lower extremity. She has been hungry, she has not been  nauseated, bowel activity is normal, urination is normal. She has no pain, no cough, no headache, no obtundation or issues in regard cognitive abilities. She has no shortness of breath. She has been up and about. She had a great breakfast this morning.   On 08/07/2022 the patient had a terrible night with hallucinations and confusion and she was all over the place seeing things, and listening voices and so forth. This morning she feels back to normal. She has no headache or fever. Her appetite is acceptable. She continues having pain in the right buttock. She has been able to walk and get around. Her bowel activity is ongoing, urination is ongoing. No fevers, no chills, no cough.     I reviewed her medication list, please review below.         ·   Past Medical History, Past Surgical History, Social History, Family History have been reviewed and are without significant changes except as mentioned.    Review of Systems   Constitutional: Positive for fatigue.   HENT: Negative.    Respiratory: Negative.    Cardiovascular: Negative.    Gastrointestinal: Negative.    Genitourinary: Negative.    Musculoskeletal: Positive for back pain.   Skin: Negative.    Neurological: Negative.    Hematological: Negative.    Psychiatric/Behavioral: Positive for confusion, hallucinations and sleep disturbance.      A comprehensive 14 point review of systems was performed and was negative except as mentioned.    Medications:  The current medication list was reviewed in the EMR    ALLERGIES:  No Known Allergies    Objective      Vitals:    08/06/22 1701 08/06/22 2007 08/07/22 0453 08/07/22 0750   BP: 139/93 139/91 125/83 107/71   BP Location: Left arm Left arm Left arm Right arm   Patient Position: Lying Lying Lying Lying   Pulse: 83 91 87 95   Resp: 18 18 18 18   Temp: 97.7 °F (36.5 °C) 96.6 °F (35.9 °C) 97.3 °F (36.3 °C) 98.9 °F (37.2 °C)   TempSrc: Oral Oral Oral Oral   SpO2: 97% 96% 93% 94%   Weight:       Height:         Current Status  7/26/2022   ECOG score 0       Physical Exam  HENT:      Head: Normocephalic.   Eyes:      General: No scleral icterus.  Cardiovascular:      Rate and Rhythm: Normal rate and regular rhythm.      Pulses: Normal pulses.      Heart sounds: Normal heart sounds.   Pulmonary:      Effort: Pulmonary effort is normal.      Breath sounds: Normal breath sounds. No stridor. No wheezing, rhonchi or rales.   Abdominal:      General: Abdomen is flat. There is no distension.      Palpations: Abdomen is soft.      Tenderness: There is no abdominal tenderness. There is no guarding or rebound.   Musculoskeletal:      Cervical back: No rigidity.      Right lower leg: No edema.      Left lower leg: No edema.   Lymphadenopathy:      Cervical: No cervical adenopathy.   Skin:     General: Skin is warm and dry.   Neurological:      General: No focal deficit present.      Mental Status: She is alert and oriented to person, place, and time.   Psychiatric:         Mood and Affect: Mood normal.         Behavior: Behavior normal.         Thought Content: Thought content normal.         Judgment: Judgment normal.           RECENT LABS:  Hematology WBC   Date Value Ref Range Status   08/07/2022 29.31 (H) 3.40 - 10.80 10*3/mm3 Final     RBC   Date Value Ref Range Status   08/07/2022 3.25 (L) 3.77 - 5.28 10*6/mm3 Final     Hemoglobin   Date Value Ref Range Status   08/07/2022 9.6 (L) 12.0 - 15.9 g/dL Final     Hematocrit   Date Value Ref Range Status   08/07/2022 29.3 (L) 34.0 - 46.6 % Final     Platelets   Date Value Ref Range Status   08/07/2022 209 140 - 450 10*3/mm3 Final          Assessment & Plan    57-year-old white female has been admitted to the hospital with neutropenic sepsis. Blood cultures are negative. There is no obvious source of infection. The patient was placed on Neupogen yesterday by me. Her white count is starting to improve and actually the patient feels substantially better today. She has minimal if any headache. No sores in  her mouth. No bone pain. She was mildly hungry this morning. She had some toast and egg. She has not had any further diarrhea. No abdominal pain. I do believe that the patient has neutropenic fever. The source of infection as usual is very difficult to find if any but the patients get better on antibiotics. I will propose to continue her Neupogen injection today and for the next day to keep helping her white count to return and allow her to improve her performance status, go up to walking and moving around and be able to discharge home in the next 2 or 3 days.      The patient also has anemia associated with chemotherapy treatment. Right now she has no need for blood products.      The patient also has a normal platelet count; fortunately she will not require any platelet products given this number.      The radiological assessment failed to document any source of infection and even an MRI of the brain failed to document any abnormality.      The other issues discussed by Dr. Snell in her note are relatively quiet at this time including her anxiety and depression. Blood pressure is under good control and her reflux symptomatology is quiet. She is not eating too much. Her port does not look infected.    On 08/05/2022, I had the opportunity to review all her MRIs. Basically she has had an MRI of the brain, cervical, thoracic, lumbar spines. It caught my attention the lesions in C2, obviously the lesions in the lumbar spine and the lesion in the sacrum, especially on the right side that is probably the reason she has so much pain throbbing in that location with propagation into the right lower extremity.     Her overall clinical status has improved since the admission. Two days ago she was in [place] with confusion, yesterday she was obnubilated but better, today, she is completely sharp and back to normality. The MRI of the brain shows no evidence of meningeal carcinomatosis or intracranial metastatic disease.  She has no pain in the neck.     Her appetite is improving. Her bowel activity is normal. Urination has improved and the patient feels substantially better.     RECOMMENDATIONS:    1. I will continue her on Neupogen for 1 more day. Her white blood count is slowly improving and we would like to improve white blood count further to minimize any further need for antibiotics maybe stopping these medicines as early as tomorrow. Her blood cultures remain negative.   2. The patient seems to be radiologically speaking has progressive disease with no new lesions in the bone. I am concerned about the lesion in the sacrum that is triggering pain into the right lower extremity. She is taking pain medicine on p.r.n. basis. That is okay. I do believe that Radiation Oncology needs to see her in consideration of treating palliatively her sacral lesion.     I do not know if we would like to proceed with treatment of C2. This will trigger a lot of sore throat and difficulty swallowing. This is not giving her pain yet. That is a decision that is difficult to make under the present circumstances.     Given the fact that it seems that at least the cancer with metastasis to the bone has progressed, probably she will require modification in chemotherapy regimen.     Finally, she was supposed to have an ultrasound of her kidneys as an outpatient. We will go ahead and proceed with this today for review tomorrow.     Her white count, hemoglobin and platelets are better. The chemistry profile remains about the same.     The patient is substantially better though.  On 08/06/2022 I discussed with the patient the fact that her radiological analysis documents again a lesion in the pelvic bone posteriorly on the right side that is the triggering factor for the symptomatology. I would like to be seen by Radiation Oncology in preparation for therapy with localized area to minimize pain.    For whatever reason she had no blood counts today. I am  going to go ahead and order them along with a chemistry profile.     The patient should be almost ready to go home in the next 1-2 days once that she is seen by Radiation Oncology. Her treatment for the pelvic bone can start as an outpatient anytime next week.    I also am going to make an appointment to be seen by Salma Snell MD, in the office in a week or so to review her clinical status. I would not be surprised if her chemotherapy regimen needs to be modified and obviously I will leave this up to Dr. Snell.    I reviewed her CA 15-3 given the fact that she has history of bilateral breast cancer. This number remains normal. Therefore what we see in the bones is not reflection of breast cancer. It is a reflection of her lung cancer. We do not need anymore tissue diagnosis on her at this point I would guess.  On 08/07/2022 the patient had a terrible night with insomnia, hallucinations, agitation, confusion, delirium in absence of any fever or pain. Today she is back to baseline. Her clinical examination does not show any new symptoms or signs. The patient is appropriate in her conversation. Her white count is 29,000, the hemoglobin is stable, the platelet count is stable. Platelet count in fact is normal. I think the white count is reflection of Neupogen utilization in the background of neutropenic fever. Obviously the Neupogen will be discontinued as per today.     The 2nd issue that this patient has is the pain in the buttock and associated with metastasis. She will initiate radiation therapy tomorrow. She will require postponement of chemotherapy administration to wait for radiation treatment to be completed and then we will recheck another visit with Salma Snell MD, in order to decide how to proceed about further chemotherapy for her stage IV lung cancer. Again I have checked a CA 15-3 that is normal. She also has bilateral early breast cancer undergoing letrozole therapy.     Finally very  concerning what happened last night. I went ahead and reviewed the medication list of this patient and this patient is taking 6 different medicines acting on the central nervous system including Xanax, Zyprexa, Wellbutrin, Prozac and Neurontin among others. I do believe that this combination is a powerhouse for confusion and problems and the doses are gigantic especially with the Neurontin for somebody with a small frame as she is. I asked her to tell me what medicines given her most of the benefit and she believes that Wellbutrin gives her a lot of benefit and gabapentin gives her a lot of benefit. I went ahead and discontinued Prozac and modified the dose of gabapentin to just 300 mg 3 times a day instead of 600 mg 3 times a day. That dose is huge for her. I discontinued the Xanax and the Zyprexa.     We will see how she evolves and how she will be tonight and once that all of these medicines are out of the system. Actually the patient's  is the one who brought it up to my attention all of the issues that happened last night. The patient does not remember a thing in that regard.     Hopefully with modification of her medicine her mind will be more clear, she will be less foggy and she will remain back into some degree of functionality.     Obviously the question is when will she be ready to go home. I will say probably tomorrow once that she washes out all of the psychiatric medication and her overall situation improves.    We will make her a new appointment to be seen by Salma Snell MD, and decide how to further proceed about chemotherapy once that she completes radiation therapy.    I will discuss the patient's case with the follow up physician from [facility] who will see the patient tomorrow.         · DURING THE VISIT WITH THE PATIENT TODAY , PATIENT HAD FACE MASK, I HAD PROPER PROTECTIVE EQUIPMENT, AND I DID HAND HYGIENE WITH SOAP AND WATER BEFORE AND AFTER THE VISIT.                8/7/2022      CC:

## 2022-08-08 ENCOUNTER — TREATMENT (OUTPATIENT)
Dept: RADIATION ONCOLOGY | Facility: HOSPITAL | Age: 57
End: 2022-08-08

## 2022-08-08 LAB
ALBUMIN SERPL-MCNC: 3.4 G/DL (ref 3.5–5.2)
ALBUMIN/GLOB SERPL: 1 G/DL
ALP SERPL-CCNC: 217 U/L (ref 39–117)
ALT SERPL W P-5'-P-CCNC: 30 U/L (ref 1–33)
ANION GAP SERPL CALCULATED.3IONS-SCNC: 12.8 MMOL/L (ref 5–15)
AST SERPL-CCNC: 22 U/L (ref 1–32)
BACTERIA SPEC AEROBE CULT: NORMAL
BACTERIA SPEC AEROBE CULT: NORMAL
BASOPHILS # BLD MANUAL: 0.27 10*3/MM3 (ref 0–0.2)
BASOPHILS NFR BLD MANUAL: 1 % (ref 0–1.5)
BILIRUB SERPL-MCNC: <0.2 MG/DL (ref 0–1.2)
BUN SERPL-MCNC: 7 MG/DL (ref 6–20)
BUN/CREAT SERPL: 7.1 (ref 7–25)
CALCIUM SPEC-SCNC: 7.1 MG/DL (ref 8.6–10.5)
CHLORIDE SERPL-SCNC: 106 MMOL/L (ref 98–107)
CO2 SERPL-SCNC: 21.2 MMOL/L (ref 22–29)
CREAT SERPL-MCNC: 0.99 MG/DL (ref 0.57–1)
DEPRECATED RDW RBC AUTO: 39 FL (ref 37–54)
EGFRCR SERPLBLD CKD-EPI 2021: 65.8 ML/MIN/1.73
EOSINOPHIL # BLD MANUAL: 0.27 10*3/MM3 (ref 0–0.4)
EOSINOPHIL NFR BLD MANUAL: 1 % (ref 0.3–6.2)
ERYTHROCYTE [DISTWIDTH] IN BLOOD BY AUTOMATED COUNT: 12.2 % (ref 12.3–15.4)
GLOBULIN UR ELPH-MCNC: 3.5 GM/DL
GLUCOSE SERPL-MCNC: 92 MG/DL (ref 65–99)
HCT VFR BLD AUTO: 28.8 % (ref 34–46.6)
HGB BLD-MCNC: 9.3 G/DL (ref 12–15.9)
LYMPHOCYTES # BLD MANUAL: 0.54 10*3/MM3 (ref 0.7–3.1)
LYMPHOCYTES NFR BLD MANUAL: 2 % (ref 5–12)
MCH RBC QN AUTO: 28.8 PG (ref 26.6–33)
MCHC RBC AUTO-ENTMCNC: 32.3 G/DL (ref 31.5–35.7)
MCV RBC AUTO: 89.2 FL (ref 79–97)
MONOCYTES # BLD: 0.54 10*3/MM3 (ref 0.1–0.9)
NEUTROPHILS # BLD AUTO: 25.5 10*3/MM3 (ref 1.7–7)
NEUTROPHILS NFR BLD MANUAL: 93.9 % (ref 42.7–76)
NRBC BLD AUTO-RTO: 0.2 /100 WBC (ref 0–0.2)
PHOSPHATE SERPL-MCNC: 0.7 MG/DL (ref 2.5–4.5)
PLAT MORPH BLD: NORMAL
PLATELET # BLD AUTO: 193 10*3/MM3 (ref 140–450)
PMV BLD AUTO: 10.1 FL (ref 6–12)
POTASSIUM SERPL-SCNC: 4.1 MMOL/L (ref 3.5–5.2)
PROT SERPL-MCNC: 6.9 G/DL (ref 6–8.5)
RAD ONC ARIA COURSE ID: NORMAL
RAD ONC ARIA COURSE INTENT: NORMAL
RAD ONC ARIA COURSE LAST TREATMENT DATE: NORMAL
RAD ONC ARIA COURSE START DATE: NORMAL
RAD ONC ARIA COURSE TREATMENT ELAPSED DAYS: 0
RAD ONC ARIA FIRST TREATMENT DATE: NORMAL
RAD ONC ARIA PLAN FRACTIONS TREATED TO DATE: 1
RAD ONC ARIA PLAN FRACTIONS TREATED TO DATE: 1
RAD ONC ARIA PLAN ID: NORMAL
RAD ONC ARIA PLAN ID: NORMAL
RAD ONC ARIA PLAN PRESCRIBED DOSE PER FRACTION: 4 GY
RAD ONC ARIA PLAN PRESCRIBED DOSE PER FRACTION: 4 GY
RAD ONC ARIA PLAN PRIMARY REFERENCE POINT: NORMAL
RAD ONC ARIA PLAN PRIMARY REFERENCE POINT: NORMAL
RAD ONC ARIA PLAN TOTAL FRACTIONS PRESCRIBED: 5
RAD ONC ARIA PLAN TOTAL FRACTIONS PRESCRIBED: 5
RAD ONC ARIA PLAN TOTAL PRESCRIBED DOSE: 2000 CGY
RAD ONC ARIA PLAN TOTAL PRESCRIBED DOSE: 2000 CGY
RAD ONC ARIA REFERENCE POINT DOSAGE GIVEN TO DATE: 4 GY
RAD ONC ARIA REFERENCE POINT DOSAGE GIVEN TO DATE: 4 GY
RAD ONC ARIA REFERENCE POINT DOSAGE GIVEN TO DATE: 4.12 GY
RAD ONC ARIA REFERENCE POINT DOSAGE GIVEN TO DATE: 4.19 GY
RAD ONC ARIA REFERENCE POINT ID: NORMAL
RAD ONC ARIA REFERENCE POINT SESSION DOSAGE GIVEN: 4 GY
RAD ONC ARIA REFERENCE POINT SESSION DOSAGE GIVEN: 4 GY
RAD ONC ARIA REFERENCE POINT SESSION DOSAGE GIVEN: 4.12 GY
RAD ONC ARIA REFERENCE POINT SESSION DOSAGE GIVEN: 4.19 GY
RBC # BLD AUTO: 3.23 10*6/MM3 (ref 3.77–5.28)
RBC MORPH BLD: NORMAL
SODIUM SERPL-SCNC: 140 MMOL/L (ref 136–145)
VARIANT LYMPHS NFR BLD MANUAL: 2 % (ref 19.6–45.3)
WBC MORPH BLD: NORMAL
WBC NRBC COR # BLD: 27.16 10*3/MM3 (ref 3.4–10.8)

## 2022-08-08 PROCEDURE — 77334 RADIATION TREATMENT AID(S): CPT | Performed by: RADIOLOGY

## 2022-08-08 PROCEDURE — 99232 SBSQ HOSP IP/OBS MODERATE 35: CPT | Performed by: INTERNAL MEDICINE

## 2022-08-08 PROCEDURE — 63710000001 ONDANSETRON PER 8 MG: Performed by: HOSPITALIST

## 2022-08-08 PROCEDURE — 85007 BL SMEAR W/DIFF WBC COUNT: CPT | Performed by: INTERNAL MEDICINE

## 2022-08-08 PROCEDURE — 77295 3-D RADIOTHERAPY PLAN: CPT | Performed by: RADIOLOGY

## 2022-08-08 PROCEDURE — 77300 RADIATION THERAPY DOSE PLAN: CPT | Performed by: RADIOLOGY

## 2022-08-08 PROCEDURE — 77263 THER RADIOLOGY TX PLNG CPLX: CPT | Performed by: RADIOLOGY

## 2022-08-08 PROCEDURE — 85025 COMPLETE CBC W/AUTO DIFF WBC: CPT | Performed by: INTERNAL MEDICINE

## 2022-08-08 PROCEDURE — 80053 COMPREHEN METABOLIC PANEL: CPT | Performed by: INTERNAL MEDICINE

## 2022-08-08 PROCEDURE — 77412 RADIATION TX DELIVERY LVL 3: CPT | Performed by: STUDENT IN AN ORGANIZED HEALTH CARE EDUCATION/TRAINING PROGRAM

## 2022-08-08 PROCEDURE — 77290 THER RAD SIMULAJ FIELD CPLX: CPT | Performed by: RADIOLOGY

## 2022-08-08 PROCEDURE — 77280 THER RAD SIMULAJ FIELD SMPL: CPT | Performed by: STUDENT IN AN ORGANIZED HEALTH CARE EDUCATION/TRAINING PROGRAM

## 2022-08-08 PROCEDURE — 77427 RADIATION TX MANAGEMENT X5: CPT | Performed by: STUDENT IN AN ORGANIZED HEALTH CARE EDUCATION/TRAINING PROGRAM

## 2022-08-08 PROCEDURE — 84100 ASSAY OF PHOSPHORUS: CPT | Performed by: HOSPITALIST

## 2022-08-08 PROCEDURE — 99231 SBSQ HOSP IP/OBS SF/LOW 25: CPT

## 2022-08-08 PROCEDURE — 25010000002 ENOXAPARIN PER 10 MG: Performed by: INTERNAL MEDICINE

## 2022-08-08 RX ORDER — SODIUM BICARBONATE 650 MG/1
1300 TABLET ORAL 3 TIMES DAILY
Qty: 180 TABLET | Refills: 0 | Status: SHIPPED | OUTPATIENT
Start: 2022-08-08 | End: 2022-08-09 | Stop reason: HOSPADM

## 2022-08-08 RX ORDER — ALPRAZOLAM 0.25 MG/1
0.25 TABLET ORAL ONCE
Status: COMPLETED | OUTPATIENT
Start: 2022-08-08 | End: 2022-08-08

## 2022-08-08 RX ORDER — ALPRAZOLAM 0.5 MG/1
0.5 TABLET ORAL NIGHTLY PRN
Status: DISCONTINUED | OUTPATIENT
Start: 2022-08-08 | End: 2022-08-09 | Stop reason: HOSPADM

## 2022-08-08 RX ORDER — ALPRAZOLAM 0.5 MG/1
0.5 TABLET ORAL NIGHTLY PRN
Qty: 60 TABLET | Refills: 2
Start: 2022-08-08 | End: 2022-08-30 | Stop reason: SDUPTHER

## 2022-08-08 RX ORDER — BUPROPION HYDROCHLORIDE 150 MG/1
150 TABLET ORAL DAILY
Qty: 30 TABLET | Refills: 0 | Status: SHIPPED | OUTPATIENT
Start: 2022-08-09 | End: 2022-09-06 | Stop reason: SDUPTHER

## 2022-08-08 RX ORDER — GABAPENTIN 300 MG/1
300 CAPSULE ORAL 3 TIMES DAILY
Qty: 180 CAPSULE | Refills: 3
Start: 2022-08-08 | End: 2022-11-21 | Stop reason: SDUPTHER

## 2022-08-08 RX ORDER — CALCITRIOL 0.5 UG/1
1 CAPSULE, LIQUID FILLED ORAL DAILY
Qty: 60 CAPSULE | Refills: 0 | Status: SHIPPED | OUTPATIENT
Start: 2022-08-09 | End: 2022-09-06 | Stop reason: SDUPTHER

## 2022-08-08 RX ADMIN — SODIUM BICARBONATE 1300 MG: 650 TABLET ORAL at 08:25

## 2022-08-08 RX ADMIN — SODIUM BICARBONATE 1300 MG: 650 TABLET ORAL at 21:11

## 2022-08-08 RX ADMIN — GABAPENTIN 300 MG: 300 CAPSULE ORAL at 21:12

## 2022-08-08 RX ADMIN — OXYCODONE AND ACETAMINOPHEN 1 TABLET: 5; 325 TABLET ORAL at 18:06

## 2022-08-08 RX ADMIN — ALPRAZOLAM 0.5 MG: 0.5 TABLET ORAL at 21:11

## 2022-08-08 RX ADMIN — PANTOPRAZOLE SODIUM 40 MG: 40 TABLET, DELAYED RELEASE ORAL at 05:50

## 2022-08-08 RX ADMIN — GABAPENTIN 300 MG: 300 CAPSULE ORAL at 05:50

## 2022-08-08 RX ADMIN — ONDANSETRON HYDROCHLORIDE 4 MG: 4 TABLET, FILM COATED ORAL at 18:13

## 2022-08-08 RX ADMIN — LEVOTHYROXINE SODIUM 25 MCG: 0.03 TABLET ORAL at 05:50

## 2022-08-08 RX ADMIN — FLUOXETINE HYDROCHLORIDE 40 MG: 20 CAPSULE ORAL at 08:25

## 2022-08-08 RX ADMIN — OXYCODONE AND ACETAMINOPHEN 1 TABLET: 5; 325 TABLET ORAL at 10:32

## 2022-08-08 RX ADMIN — GABAPENTIN 300 MG: 300 CAPSULE ORAL at 14:52

## 2022-08-08 RX ADMIN — PROPRANOLOL HYDROCHLORIDE 60 MG: 60 CAPSULE, EXTENDED RELEASE ORAL at 08:26

## 2022-08-08 RX ADMIN — OXYCODONE AND ACETAMINOPHEN 1 TABLET: 5; 325 TABLET ORAL at 03:29

## 2022-08-08 RX ADMIN — ATORVASTATIN CALCIUM 10 MG: 20 TABLET, FILM COATED ORAL at 21:11

## 2022-08-08 RX ADMIN — Medication 10 ML: at 21:11

## 2022-08-08 RX ADMIN — Medication 1 MG: at 08:23

## 2022-08-08 RX ADMIN — Medication 2 PACKET: at 12:58

## 2022-08-08 RX ADMIN — ALPRAZOLAM 0.25 MG: 0.25 TABLET ORAL at 10:32

## 2022-08-08 RX ADMIN — CALCITRIOL 1 MCG: 0.25 CAPSULE ORAL at 08:26

## 2022-08-08 RX ADMIN — Medication 10 ML: at 09:00

## 2022-08-08 RX ADMIN — BUPROPION HYDROCHLORIDE 150 MG: 150 TABLET, EXTENDED RELEASE ORAL at 08:23

## 2022-08-08 RX ADMIN — ENOXAPARIN SODIUM 40 MG: 100 INJECTION SUBCUTANEOUS at 09:00

## 2022-08-08 RX ADMIN — SODIUM BICARBONATE 1300 MG: 650 TABLET ORAL at 18:07

## 2022-08-08 RX ADMIN — Medication 2 PACKET: at 21:17

## 2022-08-08 RX ADMIN — ONDANSETRON HYDROCHLORIDE 4 MG: 4 TABLET, FILM COATED ORAL at 09:00

## 2022-08-08 NOTE — SIGNIFICANT NOTE
08/08/22 1559   OTHER   Discipline physical therapist   Rehab Time/Intention   Session Not Performed other (see comments)  (Pt off the floor to radiation and with dc orders.)   Recommendation   PT - Next Appointment 08/09/22

## 2022-08-08 NOTE — PROGRESS NOTES
[facility] NEUROSURGERY PROGRESS NOTE      CC: low back pain w/ radiation into right leg      Subjective     Interval History: Reports improvement in her symptoms.  States she is able to walk better.  Still having pain with movement but no pain with rest.  Denies bilateral lower extremity weakness.  Denies bilateral lower extremity numbness/tingling.  Denies saddle anesthesia and incontinence of bowel/bladder.    ROS:  Constitutional: No fever, chills  MS: + back pain  Neuro: No numbness, tingling, or weakness,  no balance difficulties  : No difficulty voiding, no incontinence    Objective     Vital signs in last 24 hours:  Temp:  [98.2 °F (36.8 °C)-100.4 °F (38 °C)] 98.2 °F (36.8 °C)  Heart Rate:  [83-90] 89  Resp:  [18] 18  BP: ()/(61-79) 121/78    Intake/Output this shift:  No intake/output data recorded.    LABS:  Results from last 7 days   Lab Units 08/08/22  0337 08/07/22 0445 08/06/22  1456   WBC 10*3/mm3 27.16* 29.31* 17.90*   HEMOGLOBIN g/dL 9.3* 9.6* 10.0*   HEMATOCRIT % 28.8* 29.3* 31.0*   PLATELETS 10*3/mm3 193 209 226   MONOCYTES % % 2.0* 2.9* 10.2     Results from last 7 days   Lab Units 08/08/22  0337 08/07/22 0445 08/06/22  1456   SODIUM mmol/L 140 140 143   POTASSIUM mmol/L 4.1 4.4 4.1   CHLORIDE mmol/L 106 110* 111*   CO2 mmol/L 21.2* 19.0* 19.0*   BUN mg/dL 7 7 10   CREATININE mg/dL 0.99 0.98 1.19*   CALCIUM mg/dL 7.1* 7.1* 7.3*   BILIRUBIN mg/dL <0.2 <0.2 <0.2   ALK PHOS U/L 217* 183* 149*   ALT (SGPT) U/L 30 36* 36*   AST (SGOT) U/L 22 32 33*   GLUCOSE mg/dL 92 90 85         IMAGING STUDIES:  No radiology results for the last day      I personally viewed and interpreted the patient's chart.    Meds reviewed: Yes      Physical Exam:    General:   Awake, alert, oriented x3. Speech clear with no aphasia     Motor:    5/5 bilateral DF/PF and iliopsoas strength. No fasciculations, rigidity, spasticity, or abnormal movements.  Reflexes:   No clonus  Sensation:   Normal to light touch bilateral  "LE        Assessment & Plan     ASSESSMENT:      Ataxia    Essential hypertension    Primary adenocarcinoma of upper lobe of left lung (HCC)    History of bilateral breast cancer    Stage 3a chronic kidney disease (HCC)    Confusion    Chemotherapy-induced neutropenia (HCC)    Metabolic acidosis    Bony metastasis (HCC)    Hypopotassemia    Hypophosphatemia    57-year-old female who presents with low back pain with radiation to the right leg.  On MRI cervical/thoracic/lumbar spine, she was found to have several metastatic lesions including a T1 lesion, L1 lesion, and mets in the sacrum.  On exam, she is neurologically intact and has no symptoms of cauda equina syndrome.  Complains of low back pain radiating down the right leg.  I discussed with her the option for a lumbar epidural steroid injection, but she stated she would like to try the radiation for the metastatic lesions first to see if this alleviates her pain.  Her cervical spine CT revealed a lytic lesion but no fracture.  Given that she is not having any radicular upper extremity symptoms or neck pain, she does not need to wear a cervical collar.  I discussed with her that we will have her follow-up with our office after radiation with x-rays.  I also discussed that she should follow-up with our office sooner if she starts to develop neck pain or radicular upper extremity symptoms.    PLAN:   Radiation per rad onc for metastatic lesions in spine  Follow up with Dr. Ferrera after radiation with C/T/L AP and lateral films     I discussed the patient's findings and my recommendations with patient, family and Dr. Ferrera.       LOS: 4 days       Marly Mendez PA-C  8/8/2022  10:44 EDT    \"Dictated utilizing Dragon dictation\".      "

## 2022-08-08 NOTE — PROGRESS NOTES
Case Management Discharge Note      Final Note: Home with [facility]         Selected Continued Care - Admitted Since 8/3/2022     Destination    No services have been selected for the patient.              Durable Medical Equipment    No services have been selected for the patient.              Dialysis/Infusion    No services have been selected for the patient.              Home Medical Care Coordination complete.    Service Provider Selected Services Address Phone Fax Patient Preferred    [facility]  Home Health Services [address] [address] [phone] --          Therapy    No services have been selected for the patient.              Community Resources    No services have been selected for the patient.              Community & DME    No services have been selected for the patient.                  Transportation Services  Private: Car    Final Discharge Disposition Code: 06 - home with home health care

## 2022-08-08 NOTE — PROGRESS NOTES
ID note for neutropenic sepsis  S: feels okay.  No significant pain.  No fevers or chills or night sweats    O: Temp:  [98.2 °F (36.8 °C)-100.4 °F (38 °C)] 98.2 °F (36.8 °C)  Heart Rate:  [83-90] 89  Resp:  [18] 18  BP: ()/(61-79) 121/78  GENERAL: Awake and alert, in no acute distress.   HEENT:  Hearing is grossly normal.   LUNGS:  normal respiratory effort.   SKIN:  without cutaneous eruptions   PSYCHIATRIC: Appropriate mood, affect, insight, and judgment.         Results Review:  White count 27.16, hemoglobin 9.3, platelets 193  Alk phos 217  Creatinine 0.99     Microbiology:  COVID 19 negative  BCx ngtd  GI PCR neg       A/p  Neutropenic sepsis  Ataxia  Metastatic lung adenocarcinoma  Elevated alk phos, likely secondary to bony metastasis    She is stable off antibiotics.  Cultures are negative.  White count is elevated due to G-CSF.  Clinically does not appear septic anymore and I think it be reasonable to continue to hold antibiotics given her stability.  Okay for chemoradiation from ID perspective.    We will be available if needed.. Please call with questions or conerns

## 2022-08-08 NOTE — PROGRESS NOTES
Reasons for follow-up: Neutropenic sepsis, metastatic lung cancer    HISTORY OF PRESENT ILLNESS:   This is a 57-year-old woman with progressive non-small cell lung cancer/adenocarcinoma who initiated systemic chemotherapy with Taxotere 75 mg meter squared on 7/26/2022 and was admitted with neutropenic fever/sepsis.  Her white blood cell count has recovered with growth factor support and she is having no fevers or ongoing signs of infection.  She is off antibiotics.  She is undergoing simulation for radiation therapy to C1 and the sacrum secondary to progressive disease.    The patient had a restful night per her .  Zyprexa was discontinued and Neurontin dose decreased.      Past Medical History, Past Surgical History, Social History, Family History have been reviewed and are without significant changes except as mentioned.    Review of Systems   Constitutional: Negative for fever.   Respiratory: Negative for shortness of breath.    Neurological: Negative for dizziness and numbness.   Psychiatric/Behavioral: Positive for sleep disturbance. The patient is nervous/anxious.         Medications:  The current medication list was reviewed in the EMR    ALLERGIES:  No Known Allergies    Objective      Vitals:    08/08/22 0712   BP: 121/78   Pulse: 89   Resp: 18   Temp: 98.2 °F (36.8 °C)   SpO2: 94%          Physical Exam    Deferred    RECENT LABS:  Hematology Results from last 7 days   Lab Units 08/08/22  0337 08/07/22  0445 08/06/22  1456   WBC 10*3/mm3 27.16* 29.31* 17.90*   HEMOGLOBIN g/dL 9.3* 9.6* 10.0*   HEMATOCRIT % 28.8* 29.3* 31.0*   PLATELETS 10*3/mm3 193 209 226     2  Lab Results   Component Value Date    GLUCOSE 92 08/08/2022    BUN 7 08/08/2022    CREATININE 0.99 08/08/2022    EGFRIFNONA 46 (L) 02/14/2022    EGFRIFAFRI 95 11/24/2020    BCR 7.1 08/08/2022    CO2 21.2 (L) 08/08/2022    CALCIUM 7.1 (L) 08/08/2022    PROTENTOTREF 6.7 08/02/2022    ALBUMIN 3.40 (L) 08/08/2022    LABIL2 0.8 08/02/2022    AST  22 08/08/2022    ALT 30 08/08/2022       Lab Results   Component Value Date    IRON 15 (L) 08/03/2022    TIBC 328 08/03/2022    FERRITIN 276.00 (H) 08/03/2022       Lab Results   Component Value Date    ZFXDYKNJ04 392 08/04/2022       Lab Results   Component Value Date    FOLATE >20.00 08/04/2022          Assessment & Plan   *Neutropenic fever/sepsis  · Currently off antibiotics without ongoing fever, blood cultures no growth at 4 days  · White blood cell count has recovered with growth factor support    *History of bilateral breast cancer on letrozole    *Adenocarcinoma of the left lung, metastatic with progression  · Initiated Taxotere 75 mg per metered squared every 3 weeks on 7/26/2022  · Undergoing simulation by radiation oncology for progressive metastatic disease C1 and sacrum    *Anemia-hemoglobin stable 9.3    *Acute kidney injury-creatinine improved 0.99    *Hypophosphatemia-receiving IV replacement per protocol    *Pain of malignancy-Percocet 5/325 1 p.o. every 6 hours available as needed    *Depression/anxiety- Dr. Osorio  decreased gabapentin to 300 mg 3 times a day, discontinued Zyprexa and discontinued Xanax; patient requested restarting Xanax for anxiety    Oncology plan/recommendations:  1. Continue letrozole  2. She has follow-up with Dr. Snell 8/16/2022 for C2 Taxotere assuming radiation completed  3. Add Neulasta with cycle 2 of Taxotere secondary to neutropenic fever/sepsis  4. Continue other supportive medications  5. No opposition to discharge from an oncology perspective              8/8/2022      CC:

## 2022-08-08 NOTE — PLAN OF CARE
Goal Outcome Evaluation:        Pt completed first radiation treatment today. Anticipated going home after, however phosphorus levels required replacement. Given x1 dose this shift, and will require another dose before recheck. Should go home tomorrow as long as this resolves. Pt encouraged to increase PO intake. Right chest port still accessed with good blood return. VSS.

## 2022-08-08 NOTE — PROGRESS NOTES
"    Name: Holli Patel ADMIT: 8/3/2022   : 1965  PCP: Vandana Gastelum MD    MRN: [id number] LOS: 4 days   AGE/SEX: 57 y.o. female  ROOM: [facility]     Subjective   Subjective    No more confusion however she states she did not sleep at all and currently is anxious. She states she is taking Xanax for over 20 years and would like to be back on that. She states she only takes it at night and she has \"other fish to kay\" and would like to to remain on Xanax. She is also okay with reduced gabapentin and other medications discontinued.    Review of Systems   Constitutional: Negative for fever.   Respiratory: Negative for cough and shortness of breath.    Cardiovascular: Negative for chest pain.   Gastrointestinal: Negative for abdominal pain, diarrhea, nausea and vomiting.   Genitourinary: Negative for dysuria.   Neurological: Negative for headaches.   Psychiatric/Behavioral: The patient is nervous/anxious.       Objective   Objective   Vital Signs  Temp:  [98.2 °F (36.8 °C)-100.4 °F (38 °C)] 98.2 °F (36.8 °C)  Heart Rate:  [83-90] 89  Resp:  [18] 18  BP: ()/(61-79) 121/78  SpO2:  [92 %-95 %] 94 %  on   ;   Device (Oxygen Therapy): room air  Body mass index is 30.03 kg/m².     Physical Exam  Vitals and nursing note reviewed.   Constitutional:       General: She is not in acute distress.     Appearance: Normal appearance.   Neck:      Vascular: No JVD.      Trachea: No tracheal deviation.   Cardiovascular:      Rate and Rhythm: Normal rate and regular rhythm.      Heart sounds: Normal heart sounds.   Pulmonary:      Effort: Pulmonary effort is normal. No respiratory distress.      Breath sounds: Normal breath sounds.   Abdominal:      General: Bowel sounds are normal.      Palpations: Abdomen is soft.      Tenderness: There is no abdominal tenderness.   Skin:     General: Skin is warm and dry.   Neurological:      General: No focal deficit present.      Mental Status: She is alert and oriented to person, " place, and time.      Cranial Nerves: No dysarthria or facial asymmetry.      Motor: No weakness.      Comments: Oriented to self, hospital, year, situation.   Psychiatric:         Mood and Affect: Mood is anxious.         Behavior: Behavior normal. Behavior is cooperative.     Results Review     I reviewed the patient's new clinical results.  Results from last 7 days   Lab Units 08/08/22 0337 08/07/22 0445 08/06/22  1456 08/05/22  0443   WBC 10*3/mm3 27.16* 29.31* 17.90* 2.72*  2.64*   HEMOGLOBIN g/dL 9.3* 9.6* 10.0* 9.3*  9.2*   PLATELETS 10*3/mm3 193 209 226 187  180     Results from last 7 days   Lab Units 08/08/22 0337 08/07/22 0445 08/06/22 1456 08/06/22  0858   SODIUM mmol/L 140 140 143 140   POTASSIUM mmol/L 4.1 4.4 4.1 3.3*   CHLORIDE mmol/L 106 110* 111* 110*   CO2 mmol/L 21.2* 19.0* 19.0* 17.0*   BUN mg/dL 7 7 10 10   CREATININE mg/dL 0.99 0.98 1.19* 1.10*   GLUCOSE mg/dL 92 90 85 93   EGFR mL/min/1.73 65.8 66.6 52.8* 58.0*     Results from last 7 days   Lab Units 08/08/22 0337 08/07/22 0445 08/06/22  1456 08/06/22  0858 08/04/22  0356   ALBUMIN g/dL 3.40* 3.80 3.60 3.40* 3.60   BILIRUBIN mg/dL <0.2 <0.2 <0.2  --  0.4   ALK PHOS U/L 217* 183* 149*  --  129*   AST (SGOT) U/L 22 32 33*  --  26   ALT (SGPT) U/L 30 36* 36*  --  28     Results from last 7 days   Lab Units 08/08/22 0337 08/07/22 0445 08/06/22  1456 08/06/22  0858 08/02/22  1128 08/02/22  1120   CALCIUM mg/dL 7.1* 7.1* 7.3* 7.6*   < > 7.8*   ALBUMIN g/dL 3.40* 3.80 3.60 3.40*   < > 3.90  2.9   MAGNESIUM mg/dL  --   --   --  1.8  --  1.8   PHOSPHORUS mg/dL 0.7*  --   --  1.1*  --  1.2*    < > = values in this interval not displayed.     Results from last 7 days   Lab Units 08/03/22  0806   PROCALCITONIN ng/mL 0.22   LACTATE mmol/L 0.9     No results found for: HGBA1C, POCGLU    Scheduled Meds  ALPRAZolam, 0.25 mg, Oral, Once  atorvastatin, 10 mg, Oral, Nightly  buPROPion XL, 150 mg, Oral, Daily  calcitriol, 1 mcg, Oral,  Daily  enoxaparin, 40 mg, Subcutaneous, Q24H  FLUoxetine, 40 mg, Oral, Daily  folic acid, 1 mg, Oral, Daily  gabapentin, 300 mg, Oral, Q8H  letrozole, 2.5 mg, Oral, Daily  levothyroxine, 25 mcg, Oral, Daily  pantoprazole, 40 mg, Oral, QAM  propranolol LA, 60 mg, Oral, Daily  sodium bicarbonate, 1,300 mg, Oral, TID  sodium chloride, 10 mL, Intravenous, Q12H    Continuous Infusions   PRN Meds  •  acetaminophen  •  ALPRAZolam  •  heparin  •  ondansetron **OR** ondansetron  •  oxyCODONE-acetaminophen  •  potassium chloride **OR** potassium chloride **OR** potassium chloride  •  potassium phosphate infusion greater than 15 mMoles **OR** potassium phosphate infusion greater than 15 mMoles **OR** potassium phosphate **OR** sodium phosphate IVPB **OR** sodium phosphate IVPB  •  prochlorperazine  •  Access VAD **AND** sodium chloride  •  sodium chloride  •  sodium chloride  •  SUMAtriptan     I have personally reviewed:  [x]  Laboratory   []  Microbiology   []  Radiology   []  EKG/Telemetry   []  Cardiology/Vascular   []  Pathology   []  Records      Diet  Diet Regular            Assessment/Plan     Active Hospital Problems    Diagnosis  POA   • **Ataxia [R27.0]  Yes   • Hypopotassemia [E87.6]  No   • Hypophosphatemia [E83.39]  Yes   • Confusion [R41.0]  Yes   • Chemotherapy-induced neutropenia (HCC) [D70.1, T45.1X5A]  Yes   • Metabolic acidosis [E87.2]  Yes   • Bony metastasis (HCC) [C79.51]  Yes   • Stage 3a chronic kidney disease (HCC) [N18.31]  Yes   • History of bilateral breast cancer [Z85.3]  Not Applicable   • Primary adenocarcinoma of upper lobe of left lung (HCC) [C34.12]  Yes   • Essential hypertension [I10]  Yes      Resolved Hospital Problems   No resolved problems to display.       57 y.o. female admitted with Ataxia. History of breast and lung cancer and extensive bony mets.    Fever and neutropenia resolved. Had antibiotics (finished 8/6/2022) per ID. WBC up from Neugen.    Confusion: Resolved. now. MRI  brain no mets. Medications were adjusted she is fine with the changes but would like to be back on Xanax nightly     Radiation therapy today. Oncology is postponing chemo    · SCDs for DVT prophylaxis.  · Full code.  · Discussed with patient and nursing staff.  · Anticipate discharge home with family maybe today    Daniel Lovett MD  [facility]  08/08/22  09:06 EDT

## 2022-08-08 NOTE — PLAN OF CARE
Goal Outcome Evaluation:  Plan of Care Reviewed With: patient, spouse        Progress: improving  Outcome Evaluation: A&Ox4.  Ad francis.  Spouse at bedside. Percocet q6h x 2 given this shift.  Pt unhappy this morning d/t pain pill not available when requested.  Pt states rad onc MD stated pain pill is q4-6h PRN.  Explained MD reordered at q6h and unable to give q4 unless MD changes frequency.  Spouse states patient's orientation greatly today.  Plan for radiation to begin today.  Chemo on hold until radiation completed.  Max temp 100.4 last night.  Afebrile this AM.  Spouse at bedside.

## 2022-08-08 NOTE — DISCHARGE SUMMARY
[facility]    Date of Discharge:  8/9/2022    PCP: Vandana Gastelum MD    Discharge Diagnosis:   Active Hospital Problems    Diagnosis  POA   • **Ataxia [R27.0]  Yes   • Hypopotassemia [E87.6]  No   • Hypophosphatemia [E83.39]  Yes   • Confusion [R41.0]  Yes   • Chemotherapy-induced neutropenia (HCC) [D70.1, T45.1X5A]  Yes   • Metabolic acidosis [E87.2]  Yes   • Bony metastasis (HCC) [C79.51]  Yes   • Stage 3a chronic kidney disease (HCC) [N18.31]  Yes   • History of bilateral breast cancer [Z85.3]  Not Applicable   • Primary adenocarcinoma of upper lobe of left lung (HCC) [C34.12]  Yes   • Essential hypertension [I10]  Yes      Resolved Hospital Problems   No resolved problems to display.          Consults     Date and Time Order Name Status Description    8/5/2022  1:35 PM Inpatient Radiation Oncology Consult Completed     8/5/2022 11:48 AM Inpatient Nephrology Consult Completed     8/4/2022  1:35 PM Inpatient Neurosurgery Consult Completed     8/3/2022  1:58 PM Inpatient Infectious Diseases Consult Completed     8/3/2022  1:10 PM Inpatient Neurology Consult Stroke Completed     8/3/2022 12:20 PM Hematology & Oncology Inpatient Consult Completed         Hospital Course  57 y.o. female with history of metastatic lung cancer admitted with confusion and ataxia. MRI brain was negative for stroke or mets. Her medications were adjusted. Confusion resolved however she had trouble sleeping and had a little bit of anxiety. She would like to stay on Xanax which she states she has taken for over 20 years and only takes at night. She is agreeable to other changes (reduced gabapentin and Wellbutrin, discontinued Zyprexa). Today she would very much like to go home after radiation.    She also had fever and neutropenia and completed a course of antibiotics on 8/6/2022 per ID. She was given Neupogen and her white blood cell count is up. Stool PCR was negative. Blood  cultures no growth at 5 days. COVID was negative. She remains afebrile.    She was seen by radiation oncology for progressive disease and C1 and sacrum. Radiation oncology has started treatment    25 hydroxy vitamin D level was 6.5. She is now on calcitriol started by hematology. She had hypophosphatemia (0.7 mg/dL) that has been replaced (up to 1.8 mg/dL today). Not quite back to normal but improved and she is eating some and received another dose this morning. Probably vit D deficiency causing low calcium (corrected mid 7s today) and phos and some decreased PO intake. Recommend checking BMP next week with phos. Nephrology saw her for CHASITY on CKD 2-3. They started her on bicarb which may have contributed to decreased phos absorption. Creatinine is now improved better than baseline (0.99 today). CO2 is back to normal.     I discussed the patient's findings and my recommendations with patient, family and nursing staff.    Condition on Discharge: Improved mental status. She would like to go home today.    Temp:  [97 °F (36.1 °C)-98.2 °F (36.8 °C)] 97.6 °F (36.4 °C)  Heart Rate:  [80-84] 82  Resp:  [18] 18  BP: (102-114)/(63-84) 102/75  Body mass index is 30.03 kg/m².    Physical Exam  Constitutional:       General: She is not in acute distress.     Appearance: Normal appearance. She is not toxic-appearing.   HENT:      Head: Normocephalic and atraumatic.   Cardiovascular:      Rate and Rhythm: Normal rate and regular rhythm.   Pulmonary:      Effort: Pulmonary effort is normal. No respiratory distress.      Breath sounds: Normal breath sounds. No wheezing, rhonchi or rales.   Abdominal:      General: Bowel sounds are normal.      Palpations: Abdomen is soft.      Tenderness: There is no abdominal tenderness. There is no guarding or rebound.   Musculoskeletal:         General: No swelling.   Skin:     General: Skin is warm and dry.   Neurological:      General: No focal deficit present.      Mental Status: She is alert  and oriented to person, place, and time.       Disposition: Home or Self Care       Discharge Medications      New Medications      Instructions Start Date   calcitriol 0.5 MCG capsule  Commonly known as: ROCALTROL   1 mcg, Oral, Daily         Changes to Medications      Instructions Start Date   ALPRAZolam 0.5 MG tablet  Commonly known as: XANAX  What changed:   · when to take this  · reasons to take this   0.5 mg, Oral, Nightly PRN      buPROPion  MG 24 hr tablet  Commonly known as: WELLBUTRIN XL  What changed:   · medication strength  · how much to take   150 mg, Oral, Daily      gabapentin 300 MG capsule  Commonly known as: NEURONTIN  What changed:   · how much to take  · how to take this  · when to take this  · additional instructions   300 mg, Oral, 3 Times Daily, TAKE ONE CAPSULES BY MOUTH THREE TIMES A DAY      oxyCODONE-acetaminophen 5-325 MG per tablet  Commonly known as: Percocet  What changed: how much to take   1 tablet, Oral, Every 6 Hours PRN         Continue These Medications      Instructions Start Date   FLUoxetine 40 MG capsule  Commonly known as: PROzac   TAKE ONE CAPSULE BY MOUTH DAILY      folic acid 1 MG tablet  Commonly known as: FOLVITE   1 mg, Oral, Daily      letrozole 2.5 MG tablet  Commonly known as: FEMARA   2.5 mg, Oral, Daily      levothyroxine 25 MCG tablet  Commonly known as: SYNTHROID, LEVOTHROID   25 mcg, Oral, Daily      omeprazole 20 MG capsule  Commonly known as: PrilOSEC   20 mg, Oral, 2 Times Daily      prochlorperazine 10 MG tablet  Commonly known as: COMPAZINE   10 mg, Oral, Every 6 Hours PRN      propranolol LA 60 MG 24 hr capsule  Commonly known as: Inderal LA   60 mg, Oral, Daily      rizatriptan 10 MG tablet  Commonly known as: MAXALT   10 mg, Oral, Once, AT ONSET OF HEADACHE MAY REPEAT ONE TABLET IN 2 HOURS IF NEEDED      simvastatin 20 MG tablet  Commonly known as: ZOCOR   20 mg, Oral, Daily         Stop These Medications    dexamethasone 4 MG tablet  Commonly  known as: DECADRON     Morphine 15 MG 12 hr tablet  Commonly known as: MS CONTIN     OLANZapine 5 MG tablet  Commonly known as: zyPREXA           Diet Instructions     Diet: Regular      Discharge Diet: Regular         Activity Instructions     Activity as Tolerated           Additional Instructions for the Follow-ups that You Need to Schedule     Call MD for problems / concerns.   As directed         Contact information for follow-up providers     Vandana Gastelum MD Follow up in 1 week(s).    Specialty: Family Medicine  Why: BMP later this week with phos  Contact information:  [address] [facility] [id number] [phone]             Salma Snell MD Follow up.    Specialties: Hematology and Oncology, Internal Medicine, Oncology  Contact information:  [address]  [facility] [address]  [facility] [id number] [phone]                   Contact information for after-discharge care     Home Medical Care     [facility] .    Service: Home Health Services  Contact information:  [address]  [facility] [address] [phone]                               Daniel Lovett MD  [facility]  08/09/22    Discharge time spent greater than 30 minutes.

## 2022-08-09 ENCOUNTER — READMISSION MANAGEMENT (OUTPATIENT)
Dept: CALL CENTER | Facility: HOSPITAL | Age: 57
End: 2022-08-09

## 2022-08-09 ENCOUNTER — TREATMENT (OUTPATIENT)
Dept: RADIATION ONCOLOGY | Facility: HOSPITAL | Age: 57
End: 2022-08-09

## 2022-08-09 VITALS
OXYGEN SATURATION: 94 % | SYSTOLIC BLOOD PRESSURE: 120 MMHG | DIASTOLIC BLOOD PRESSURE: 79 MMHG | HEIGHT: 63 IN | BODY MASS INDEX: 30.04 KG/M2 | HEART RATE: 82 BPM | RESPIRATION RATE: 18 BRPM | TEMPERATURE: 98.4 F | WEIGHT: 169.53 LBS

## 2022-08-09 LAB
ALBUMIN SERPL-MCNC: 3.2 G/DL (ref 3.5–5.2)
ALBUMIN/GLOB SERPL: 1 G/DL
ALP SERPL-CCNC: 182 U/L (ref 39–117)
ALT SERPL W P-5'-P-CCNC: 27 U/L (ref 1–33)
ANION GAP SERPL CALCULATED.3IONS-SCNC: 11.6 MMOL/L (ref 5–15)
AST SERPL-CCNC: 24 U/L (ref 1–32)
BILIRUB SERPL-MCNC: <0.2 MG/DL (ref 0–1.2)
BUN SERPL-MCNC: 10 MG/DL (ref 6–20)
BUN/CREAT SERPL: 9.9 (ref 7–25)
CALCIUM SPEC-SCNC: 6.8 MG/DL (ref 8.6–10.5)
CHLORIDE SERPL-SCNC: 106 MMOL/L (ref 98–107)
CO2 SERPL-SCNC: 23.4 MMOL/L (ref 22–29)
CREAT SERPL-MCNC: 1.01 MG/DL (ref 0.57–1)
DEPRECATED RDW RBC AUTO: 41.3 FL (ref 37–54)
EGFRCR SERPLBLD CKD-EPI 2021: 64.3 ML/MIN/1.73
ERYTHROCYTE [DISTWIDTH] IN BLOOD BY AUTOMATED COUNT: 12.5 % (ref 12.3–15.4)
GLOBULIN UR ELPH-MCNC: 3.2 GM/DL
GLUCOSE SERPL-MCNC: 85 MG/DL (ref 65–99)
HCT VFR BLD AUTO: 28.1 % (ref 34–46.6)
HGB BLD-MCNC: 9.1 G/DL (ref 12–15.9)
LYMPHOCYTES # BLD MANUAL: 0.54 10*3/MM3 (ref 0.7–3.1)
LYMPHOCYTES NFR BLD MANUAL: 5 % (ref 5–12)
MCH RBC QN AUTO: 29.6 PG (ref 26.6–33)
MCHC RBC AUTO-ENTMCNC: 32.4 G/DL (ref 31.5–35.7)
MCV RBC AUTO: 91.5 FL (ref 79–97)
MONOCYTES # BLD: 0.9 10*3/MM3 (ref 0.1–0.9)
NEUTROPHILS # BLD AUTO: 16.49 10*3/MM3 (ref 1.7–7)
NEUTROPHILS NFR BLD MANUAL: 92 % (ref 42.7–76)
PHOSPHATE SERPL-MCNC: 1.8 MG/DL (ref 2.5–4.5)
PLAT MORPH BLD: NORMAL
PLATELET # BLD AUTO: 181 10*3/MM3 (ref 140–450)
PMV BLD AUTO: 9.9 FL (ref 6–12)
POTASSIUM SERPL-SCNC: 3.7 MMOL/L (ref 3.5–5.2)
PROT SERPL-MCNC: 6.4 G/DL (ref 6–8.5)
RAD ONC ARIA COURSE ID: NORMAL
RAD ONC ARIA COURSE INTENT: NORMAL
RAD ONC ARIA COURSE LAST TREATMENT DATE: NORMAL
RAD ONC ARIA COURSE START DATE: NORMAL
RAD ONC ARIA COURSE TREATMENT ELAPSED DAYS: 1
RAD ONC ARIA FIRST TREATMENT DATE: NORMAL
RAD ONC ARIA PLAN FRACTIONS TREATED TO DATE: 2
RAD ONC ARIA PLAN FRACTIONS TREATED TO DATE: 2
RAD ONC ARIA PLAN ID: NORMAL
RAD ONC ARIA PLAN ID: NORMAL
RAD ONC ARIA PLAN PRESCRIBED DOSE PER FRACTION: 4 GY
RAD ONC ARIA PLAN PRESCRIBED DOSE PER FRACTION: 4 GY
RAD ONC ARIA PLAN PRIMARY REFERENCE POINT: NORMAL
RAD ONC ARIA PLAN PRIMARY REFERENCE POINT: NORMAL
RAD ONC ARIA PLAN TOTAL FRACTIONS PRESCRIBED: 5
RAD ONC ARIA PLAN TOTAL FRACTIONS PRESCRIBED: 5
RAD ONC ARIA PLAN TOTAL PRESCRIBED DOSE: 2000 CGY
RAD ONC ARIA PLAN TOTAL PRESCRIBED DOSE: 2000 CGY
RAD ONC ARIA REFERENCE POINT DOSAGE GIVEN TO DATE: 8 GY
RAD ONC ARIA REFERENCE POINT DOSAGE GIVEN TO DATE: 8 GY
RAD ONC ARIA REFERENCE POINT DOSAGE GIVEN TO DATE: 8.23 GY
RAD ONC ARIA REFERENCE POINT DOSAGE GIVEN TO DATE: 8.39 GY
RAD ONC ARIA REFERENCE POINT ID: NORMAL
RAD ONC ARIA REFERENCE POINT SESSION DOSAGE GIVEN: 4 GY
RAD ONC ARIA REFERENCE POINT SESSION DOSAGE GIVEN: 4 GY
RAD ONC ARIA REFERENCE POINT SESSION DOSAGE GIVEN: 4.12 GY
RAD ONC ARIA REFERENCE POINT SESSION DOSAGE GIVEN: 4.19 GY
RBC # BLD AUTO: 3.07 10*6/MM3 (ref 3.77–5.28)
RBC MORPH BLD: NORMAL
SODIUM SERPL-SCNC: 141 MMOL/L (ref 136–145)
VARIANT LYMPHS NFR BLD MANUAL: 3 % (ref 19.6–45.3)
WBC MORPH BLD: NORMAL
WBC NRBC COR # BLD: 17.92 10*3/MM3 (ref 3.4–10.8)

## 2022-08-09 PROCEDURE — 77412 RADIATION TX DELIVERY LVL 3: CPT | Performed by: STUDENT IN AN ORGANIZED HEALTH CARE EDUCATION/TRAINING PROGRAM

## 2022-08-09 PROCEDURE — 84100 ASSAY OF PHOSPHORUS: CPT | Performed by: HOSPITALIST

## 2022-08-09 PROCEDURE — 25010000002 ENOXAPARIN PER 10 MG: Performed by: INTERNAL MEDICINE

## 2022-08-09 PROCEDURE — 99231 SBSQ HOSP IP/OBS SF/LOW 25: CPT | Performed by: INTERNAL MEDICINE

## 2022-08-09 PROCEDURE — 63710000001 ONDANSETRON PER 8 MG: Performed by: HOSPITALIST

## 2022-08-09 PROCEDURE — 25010000002 HEPARIN LOCK FLUSH PER 10 UNITS: Performed by: HOSPITALIST

## 2022-08-09 PROCEDURE — 85007 BL SMEAR W/DIFF WBC COUNT: CPT | Performed by: INTERNAL MEDICINE

## 2022-08-09 PROCEDURE — 80053 COMPREHEN METABOLIC PANEL: CPT | Performed by: INTERNAL MEDICINE

## 2022-08-09 PROCEDURE — 85025 COMPLETE CBC W/AUTO DIFF WBC: CPT | Performed by: INTERNAL MEDICINE

## 2022-08-09 RX ADMIN — ONDANSETRON HYDROCHLORIDE 4 MG: 4 TABLET, FILM COATED ORAL at 10:23

## 2022-08-09 RX ADMIN — Medication 1 MG: at 09:24

## 2022-08-09 RX ADMIN — OXYCODONE AND ACETAMINOPHEN 1 TABLET: 5; 325 TABLET ORAL at 06:13

## 2022-08-09 RX ADMIN — GABAPENTIN 300 MG: 300 CAPSULE ORAL at 06:13

## 2022-08-09 RX ADMIN — Medication 2 PACKET: at 07:21

## 2022-08-09 RX ADMIN — HEPARIN 500 UNITS: 100 SYRINGE at 12:24

## 2022-08-09 RX ADMIN — LETROZOLE 2.5 MG: 2.5 TABLET ORAL at 09:25

## 2022-08-09 RX ADMIN — OXYCODONE AND ACETAMINOPHEN 1 TABLET: 5; 325 TABLET ORAL at 12:12

## 2022-08-09 RX ADMIN — Medication 10 ML: at 09:22

## 2022-08-09 RX ADMIN — SODIUM BICARBONATE 1300 MG: 650 TABLET ORAL at 09:23

## 2022-08-09 RX ADMIN — PROPRANOLOL HYDROCHLORIDE 60 MG: 60 CAPSULE, EXTENDED RELEASE ORAL at 09:25

## 2022-08-09 RX ADMIN — PANTOPRAZOLE SODIUM 40 MG: 40 TABLET, DELAYED RELEASE ORAL at 06:13

## 2022-08-09 RX ADMIN — LEVOTHYROXINE SODIUM 25 MCG: 0.03 TABLET ORAL at 06:13

## 2022-08-09 RX ADMIN — CALCITRIOL 1 MCG: 0.25 CAPSULE ORAL at 09:24

## 2022-08-09 RX ADMIN — BUPROPION HYDROCHLORIDE 150 MG: 150 TABLET, EXTENDED RELEASE ORAL at 09:23

## 2022-08-09 RX ADMIN — OXYCODONE AND ACETAMINOPHEN 1 TABLET: 5; 325 TABLET ORAL at 00:08

## 2022-08-09 RX ADMIN — ENOXAPARIN SODIUM 40 MG: 100 INJECTION SUBCUTANEOUS at 09:21

## 2022-08-09 RX ADMIN — FLUOXETINE HYDROCHLORIDE 40 MG: 20 CAPSULE ORAL at 09:23

## 2022-08-09 NOTE — PROGRESS NOTES
Reasons for follow-up: Neutropenic sepsis, metastatic lung cancer    HISTORY OF PRESENT ILLNESS:   This is a 57-year-old woman with progressive non-small cell lung cancer/adenocarcinoma who initiated systemic chemotherapy with Taxotere 75 mg meter squared on 7/26/2022 and was admitted with neutropenic fever/sepsis.  Her white blood cell count has recovered with growth factor support and she is having no fevers or ongoing signs of infection.  She is off antibiotics.  She is undergoing simulation for radiation therapy to C1 and the sacrum secondary to progressive disease.    Discharge delayed yesterday secondary to need of phosphorus replacement.  The patient denies significant muscle weakness.  She has occasional diarrhea but nothing daily.  She denies fevers or chills.    Past Medical History, Past Surgical History, Social History, Family History have been reviewed and are without significant changes except as mentioned.    Review of Systems   Constitutional: Negative for fever.   HENT: Negative for mouth sores.    Respiratory: Negative for shortness of breath.    Gastrointestinal: Positive for diarrhea. Negative for nausea and vomiting.   Neurological: Negative for dizziness and numbness.   Psychiatric/Behavioral: Positive for sleep disturbance. The patient is nervous/anxious.         Medications:  The current medication list was reviewed in the EMR    ALLERGIES:  No Known Allergies    Objective      Vitals:    08/09/22 0743   BP: 102/75   Pulse: 82   Resp: 18   Temp: 97.6 °F (36.4 °C)   SpO2: 94%          Physical Exam    CONSTITUTIONAL: pleasant well-developed adult woman walking about the room  HEENT: no icterus, no thrush, moist membranes  LYMPH: no cervical or supraclavicular lad  CV: RRR, S1S2, no murmur  RESP: cta bilat, no wheezing, no rales  GI: soft, non-tender, no splenomegaly, +bs  MUSC: no edema, normal gait  NEURO: alert and oriented x3, normal strength  PSYCH: normal mood and affect    RECENT  LABS:  Hematology Results from last 7 days   Lab Units 08/09/22  0407 08/08/22  0337 08/07/22  0445   WBC 10*3/mm3 17.92* 27.16* 29.31*   HEMOGLOBIN g/dL 9.1* 9.3* 9.6*   HEMATOCRIT % 28.1* 28.8* 29.3*   PLATELETS 10*3/mm3 181 193 209     2  Lab Results   Component Value Date    GLUCOSE 85 08/09/2022    BUN 10 08/09/2022    CREATININE 1.01 (H) 08/09/2022    EGFRIFNONA 46 (L) 02/14/2022    EGFRIFAFRI 95 11/24/2020    BCR 9.9 08/09/2022    CO2 23.4 08/09/2022    CALCIUM 6.8 (L) 08/09/2022    PROTENTOTREF 6.7 08/02/2022    ALBUMIN 3.20 (L) 08/09/2022    LABIL2 0.8 08/02/2022    AST 24 08/09/2022    ALT 27 08/09/2022       Lab Results   Component Value Date    IRON 15 (L) 08/03/2022    TIBC 328 08/03/2022    FERRITIN 276.00 (H) 08/03/2022       Lab Results   Component Value Date    JNGRQQQV65 392 08/04/2022       Lab Results   Component Value Date    FOLATE >20.00 08/04/2022          Assessment & Plan   *Neutropenic fever/sepsis  · Currently off antibiotics without ongoing fever, blood cultures no growth at 4 days  · White blood cell count has recovered with growth factor support    *History of bilateral breast cancer on letrozole    *Adenocarcinoma of the left lung, metastatic with progression  · Initiated Taxotere 75 mg per metered squared every 3 weeks on 7/26/2022  · Undergoing simulation by radiation oncology for progressive metastatic disease C1 and sacrum    *Anemia-hemoglobin stable 9.1    *Acute kidney injury-creatinine stable 1.01    *Hypophosphatemia-receiving oral replacement per protocol-improved 1.8    *Pain of malignancy-Percocet 5/325 1 p.o. every 6 hours available as needed    *Depression/anxiety- Dr. Osorio  decreased gabapentin to 300 mg 3 times a day, discontinued Zyprexa and discontinued Xanax; patient requested restarting Xanax for anxiety    Oncology plan/recommendations:  1. Continue letrozole  2. She has follow-up with Dr. Snell 8/16/2022 for C2 Taxotere assuming radiation completed  3. Add  Neulasta with cycle 2 of Taxotere secondary to neutropenic fever/sepsis  4. Continue other supportive medications  5. No opposition to discharge from an oncology perspective- oncology will sign off please call if further needed during the hospital stay              8/9/2022      CC:

## 2022-08-09 NOTE — SIGNIFICANT NOTE
08/09/22 0973   OTHER   Discipline physical therapist   Rehab Time/Intention   Session Not Performed other (see comments)  (Pt now ambulating independently and has been up ad francis. Pt reports no difficulty and states she has been ambulating in hallways. Pt has met all acute PT goals; will sign off.)

## 2022-08-09 NOTE — PROGRESS NOTES
Case Management Discharge Note      Final Note: Home with [facility]         Selected Continued Care - Admitted Since 8/3/2022     Destination    No services have been selected for the patient.              Durable Medical Equipment    No services have been selected for the patient.              Dialysis/Infusion    No services have been selected for the patient.              Home Medical Care Coordination complete.    Service Provider Selected Services Address Phone Fax Patient Preferred    [facility]  Home Health Services [address] [address] [phone] --          Therapy    No services have been selected for the patient.              Community Resources    No services have been selected for the patient.              Community & DME    No services have been selected for the patient.                  Transportation Services  Private: Car    Final Discharge Disposition Code: 06 - home with home health care

## 2022-08-09 NOTE — OUTREACH NOTE
Prep Survey    Flowsheet Row Responses   [facility] patient discharged from? [place]   Is LACE score < 7 ? No   Emergency Room discharge w/ pulse ox? No   Eligibility [facility]   Date of Admission 08/03/22   Date of Discharge 08/09/22   Discharge Disposition Home or Self Care   Discharge diagnosis Ataxia   Does the patient have one of the following disease processes/diagnoses(primary or secondary)? Other   Does the patient have Home health ordered? Yes   What is the Home health agency?  [facility]   Is there a DME ordered? No   General alerts for this patient metastatic lung cancer    Prep survey completed? Yes          CANDIDO GUEVARA - Registered Nurse

## 2022-08-09 NOTE — PLAN OF CARE
Goal Outcome Evaluation:  Plan of Care Reviewed With: patient, spouse        Progress: no change  Outcome Evaluation: VSS.  A&Ox4.  No confusion this shift.  Ad francis.  Spouse at bedside.  Percocet x 2 given for pain.  Phos PO replaced.  PRN Xanax given at bedtime.  Radiation planned for today.

## 2022-08-10 ENCOUNTER — APPOINTMENT (OUTPATIENT)
Dept: RADIATION ONCOLOGY | Facility: HOSPITAL | Age: 57
End: 2022-08-10

## 2022-08-10 ENCOUNTER — TRANSITIONAL CARE MANAGEMENT TELEPHONE ENCOUNTER (OUTPATIENT)
Dept: CALL CENTER | Facility: HOSPITAL | Age: 57
End: 2022-08-10

## 2022-08-10 LAB
RAD ONC ARIA COURSE ID: NORMAL
RAD ONC ARIA COURSE INTENT: NORMAL
RAD ONC ARIA COURSE LAST TREATMENT DATE: NORMAL
RAD ONC ARIA COURSE START DATE: NORMAL
RAD ONC ARIA COURSE TREATMENT ELAPSED DAYS: 2
RAD ONC ARIA FIRST TREATMENT DATE: NORMAL
RAD ONC ARIA PLAN FRACTIONS TREATED TO DATE: 3
RAD ONC ARIA PLAN FRACTIONS TREATED TO DATE: 3
RAD ONC ARIA PLAN ID: NORMAL
RAD ONC ARIA PLAN ID: NORMAL
RAD ONC ARIA PLAN PRESCRIBED DOSE PER FRACTION: 4 GY
RAD ONC ARIA PLAN PRESCRIBED DOSE PER FRACTION: 4 GY
RAD ONC ARIA PLAN PRIMARY REFERENCE POINT: NORMAL
RAD ONC ARIA PLAN PRIMARY REFERENCE POINT: NORMAL
RAD ONC ARIA PLAN TOTAL FRACTIONS PRESCRIBED: 5
RAD ONC ARIA PLAN TOTAL FRACTIONS PRESCRIBED: 5
RAD ONC ARIA PLAN TOTAL PRESCRIBED DOSE: 2000 CGY
RAD ONC ARIA PLAN TOTAL PRESCRIBED DOSE: 2000 CGY
RAD ONC ARIA REFERENCE POINT DOSAGE GIVEN TO DATE: 12 GY
RAD ONC ARIA REFERENCE POINT DOSAGE GIVEN TO DATE: 12 GY
RAD ONC ARIA REFERENCE POINT DOSAGE GIVEN TO DATE: 12.35 GY
RAD ONC ARIA REFERENCE POINT DOSAGE GIVEN TO DATE: 12.58 GY
RAD ONC ARIA REFERENCE POINT ID: NORMAL
RAD ONC ARIA REFERENCE POINT SESSION DOSAGE GIVEN: 4 GY
RAD ONC ARIA REFERENCE POINT SESSION DOSAGE GIVEN: 4 GY
RAD ONC ARIA REFERENCE POINT SESSION DOSAGE GIVEN: 4.12 GY
RAD ONC ARIA REFERENCE POINT SESSION DOSAGE GIVEN: 4.19 GY

## 2022-08-10 PROCEDURE — 77412 RADIATION TX DELIVERY LVL 3: CPT | Performed by: STUDENT IN AN ORGANIZED HEALTH CARE EDUCATION/TRAINING PROGRAM

## 2022-08-10 NOTE — OUTREACH NOTE
Call Center TCM Note    Flowsheet Row Responses   [facility] patient discharged from? [place]   Does the patient have one of the following disease processes/diagnoses(primary or secondary)? Other   TCM attempt successful? No   Unsuccessful attempts Attempt 1  [She was getting ready to have radiation treatment]          Lisa Proctor LPN    8/10/2022, 15:32 EDT

## 2022-08-10 NOTE — OUTREACH NOTE
Call Center TCM Note    Flowsheet Row Responses   [facility] patient discharged from? [place]   Does the patient have one of the following disease processes/diagnoses(primary or secondary)? Other   TCM attempt successful? No   Unsuccessful attempts Attempt 2  [Pt having radiation at this time]          Lisa Proctor LPN    8/10/2022, 15:37 EDT

## 2022-08-11 ENCOUNTER — TREATMENT (OUTPATIENT)
Dept: RADIATION ONCOLOGY | Facility: HOSPITAL | Age: 57
End: 2022-08-11

## 2022-08-11 ENCOUNTER — RADIATION ONCOLOGY WEEKLY ASSESSMENT (OUTPATIENT)
Dept: RADIATION ONCOLOGY | Facility: HOSPITAL | Age: 57
End: 2022-08-11

## 2022-08-11 ENCOUNTER — TRANSITIONAL CARE MANAGEMENT TELEPHONE ENCOUNTER (OUTPATIENT)
Dept: CALL CENTER | Facility: HOSPITAL | Age: 57
End: 2022-08-11

## 2022-08-11 VITALS
OXYGEN SATURATION: 96 % | WEIGHT: 160 LBS | SYSTOLIC BLOOD PRESSURE: 119 MMHG | BODY MASS INDEX: 28.34 KG/M2 | DIASTOLIC BLOOD PRESSURE: 86 MMHG | HEART RATE: 97 BPM

## 2022-08-11 DIAGNOSIS — C34.90 NON-SMALL CELL LUNG CANCER METASTATIC TO BONE: Primary | ICD-10-CM

## 2022-08-11 DIAGNOSIS — C79.51 NON-SMALL CELL LUNG CANCER METASTATIC TO BONE: Primary | ICD-10-CM

## 2022-08-11 LAB
RAD ONC ARIA COURSE ID: NORMAL
RAD ONC ARIA COURSE INTENT: NORMAL
RAD ONC ARIA COURSE LAST TREATMENT DATE: NORMAL
RAD ONC ARIA COURSE START DATE: NORMAL
RAD ONC ARIA COURSE TREATMENT ELAPSED DAYS: 3
RAD ONC ARIA FIRST TREATMENT DATE: NORMAL
RAD ONC ARIA PLAN FRACTIONS TREATED TO DATE: 4
RAD ONC ARIA PLAN FRACTIONS TREATED TO DATE: 4
RAD ONC ARIA PLAN ID: NORMAL
RAD ONC ARIA PLAN ID: NORMAL
RAD ONC ARIA PLAN PRESCRIBED DOSE PER FRACTION: 4 GY
RAD ONC ARIA PLAN PRESCRIBED DOSE PER FRACTION: 4 GY
RAD ONC ARIA PLAN PRIMARY REFERENCE POINT: NORMAL
RAD ONC ARIA PLAN PRIMARY REFERENCE POINT: NORMAL
RAD ONC ARIA PLAN TOTAL FRACTIONS PRESCRIBED: 5
RAD ONC ARIA PLAN TOTAL FRACTIONS PRESCRIBED: 5
RAD ONC ARIA PLAN TOTAL PRESCRIBED DOSE: 2000 CGY
RAD ONC ARIA PLAN TOTAL PRESCRIBED DOSE: 2000 CGY
RAD ONC ARIA REFERENCE POINT DOSAGE GIVEN TO DATE: 16 GY
RAD ONC ARIA REFERENCE POINT DOSAGE GIVEN TO DATE: 16 GY
RAD ONC ARIA REFERENCE POINT DOSAGE GIVEN TO DATE: 16.46 GY
RAD ONC ARIA REFERENCE POINT DOSAGE GIVEN TO DATE: 16.78 GY
RAD ONC ARIA REFERENCE POINT ID: NORMAL
RAD ONC ARIA REFERENCE POINT SESSION DOSAGE GIVEN: 4 GY
RAD ONC ARIA REFERENCE POINT SESSION DOSAGE GIVEN: 4 GY
RAD ONC ARIA REFERENCE POINT SESSION DOSAGE GIVEN: 4.12 GY
RAD ONC ARIA REFERENCE POINT SESSION DOSAGE GIVEN: 4.19 GY

## 2022-08-11 PROCEDURE — 77336 RADIATION PHYSICS CONSULT: CPT | Performed by: STUDENT IN AN ORGANIZED HEALTH CARE EDUCATION/TRAINING PROGRAM

## 2022-08-11 PROCEDURE — FACE2FACE: Performed by: STUDENT IN AN ORGANIZED HEALTH CARE EDUCATION/TRAINING PROGRAM

## 2022-08-11 PROCEDURE — 77412 RADIATION TX DELIVERY LVL 3: CPT | Performed by: STUDENT IN AN ORGANIZED HEALTH CARE EDUCATION/TRAINING PROGRAM

## 2022-08-11 NOTE — PROGRESS NOTES
Radiation Oncology  On-Treatment Note      Patient: Holli Patel    MRN: [id number]    Attending Physician: Dr. Katz    Diagnosis:     ICD-10-CM ICD-9-CM   1. 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)  C34.90 162.9    C79.51 198.5       Radiation Therapy Visit:  Continue radiation therapy, Dosimetry plan remains acceptable, Films reviewed and remains acceptable, Pain assessed, Pain management planned, Radiation dose schedule reviewed and remains acceptable, Radiation technique remains acceptable and Symptoms within expected range    Radiation Treatments     Active   Plans   [id number]   Most recent treatment: Dose planned: 400 cGy (fraction 4 on 8/11/2022)   Total: Dose planned: 2,000 cGy (5 fractions)   Elapsed Days: 3      Sacrum   Most recent treatment: Dose planned: 400 cGy (fraction 4 on 8/11/2022)   Total: Dose planned: 2,000 cGy (5 fractions)   Elapsed Days: 3      Reference Points   Rx: L Adrenal Re   Most recent treatment: Dose given: -- (on 7/14/2022)   Total: Dose given: 3,000 cGy   Elapsed Days: 13      calc Adrenal ReT   Most recent treatment: Dose given: -- (on 7/14/2022)   Total: Dose given: 3,486 cGy   Elapsed Days: 13      Rx: L1 ReTx   Most recent treatment: Dose given: -- (on 7/13/2022)   Total: Dose given: 3,000 cGy   Elapsed Days: 13      calc L1 ReTx   Most recent treatment: Dose given: -- (on 7/13/2022)   Total: Dose given: 3,442 cGy   Elapsed Days: 13      Rx: C-Spine   Most recent treatment: Dose given: 400 cGy (on 8/11/2022)   Total: Dose given: 1,600 cGy   Elapsed Days: 3      Rx: Sacrum   Most recent treatment: Dose given: 400 cGy (on 8/11/2022)   Total: Dose given: 1,600 cGy   Elapsed Days: 3      calc C-Spine   Most recent treatment: Dose given: 412 cGy (on 8/11/2022)   Total: Dose given: 1,646 cGy   Elapsed Days: 3      calc Sacrum   Most recent treatment: Dose given: 419 cGy (on 8/11/2022)   Total: Dose given: 1,678 cGy   Elapsed Days: 3                    Physical  Examination:  Vitals: Blood pressure 119/86, pulse 97, weight 72.6 kg (160 lb), SpO2 96 %, not currently breastfeeding.  Vitals:    08/11/22 1555   BP: 119/86   Pulse: 97   SpO2: 96%   Weight: 72.6 kg (160 lb)       Restricted in physically strenuous activity but ambulatory and able to carry out work of a light or sedentary nature, e.g., light house work, office work = 1    We examined the relevant areas: yes  Findings are within the expected range for this stage of treatment: yes  -------------------------------------------------------------------------------------------------------------------    ACTION ITEMS:  Continue radiation therapy as planned    Estimated Completion Date: 8/12/2022    Follow up PRN  Scheduled to see her [facility] next week      Yoandy Katz MD  Radiation Oncology

## 2022-08-12 ENCOUNTER — TELEPHONE (OUTPATIENT)
Dept: NEUROSURGERY | Facility: CLINIC | Age: 57
End: 2022-08-12

## 2022-08-12 ENCOUNTER — DOCUMENTATION (OUTPATIENT)
Dept: RADIATION ONCOLOGY | Facility: HOSPITAL | Age: 57
End: 2022-08-12

## 2022-08-12 DIAGNOSIS — C79.51 BONY METASTASIS: Primary | ICD-10-CM

## 2022-08-12 PROCEDURE — 77412 RADIATION TX DELIVERY LVL 3: CPT | Performed by: STUDENT IN AN ORGANIZED HEALTH CARE EDUCATION/TRAINING PROGRAM

## 2022-08-12 NOTE — TELEPHONE ENCOUNTER
IMAGING ORDERS WERE PLACED, I CALLED PATIENT TO MAKE AN APPOINTMENT WITH HER AND SHE REQUESTED THAT I CALL HER ON Monday AS SHE WAS NOT PREPARED TO MAKE AN APPT TODAY.

## 2022-08-12 NOTE — TELEPHONE ENCOUNTER
----- Message from Marly Mendez PA-C sent at 8/8/2022  3:45 PM EDT -----  Please schedule this patient for a follow-up appointment with Dr. Ferrera after she finishes radiation which is on Friday.  Please get AP and lateral cervical, thoracic, lumbar films. Thanks.

## 2022-08-16 ENCOUNTER — OFFICE VISIT (OUTPATIENT)
Dept: ONCOLOGY | Facility: CLINIC | Age: 57
End: 2022-08-16

## 2022-08-16 ENCOUNTER — INFUSION (OUTPATIENT)
Dept: ONCOLOGY | Facility: HOSPITAL | Age: 57
End: 2022-08-16

## 2022-08-16 VITALS
RESPIRATION RATE: 16 BRPM | OXYGEN SATURATION: 97 % | SYSTOLIC BLOOD PRESSURE: 127 MMHG | BODY MASS INDEX: 28 KG/M2 | HEART RATE: 60 BPM | HEIGHT: 63 IN | TEMPERATURE: 97.3 F | WEIGHT: 158 LBS | DIASTOLIC BLOOD PRESSURE: 88 MMHG

## 2022-08-16 DIAGNOSIS — C34.12 PRIMARY ADENOCARCINOMA OF UPPER LOBE OF LEFT LUNG: Primary | ICD-10-CM

## 2022-08-16 DIAGNOSIS — C34.90 NON-SMALL CELL LUNG CANCER METASTATIC TO BONE: Primary | ICD-10-CM

## 2022-08-16 DIAGNOSIS — C79.51 NON-SMALL CELL LUNG CANCER METASTATIC TO BONE: ICD-10-CM

## 2022-08-16 DIAGNOSIS — C79.51 NON-SMALL CELL LUNG CANCER METASTATIC TO BONE: Primary | ICD-10-CM

## 2022-08-16 DIAGNOSIS — C34.90 NON-SMALL CELL LUNG CANCER METASTATIC TO BONE: ICD-10-CM

## 2022-08-16 LAB
ALBUMIN SERPL-MCNC: 3.7 G/DL (ref 3.5–5.2)
ALBUMIN/GLOB SERPL: 0.9 G/DL (ref 1.1–2.4)
ALP SERPL-CCNC: 130 U/L (ref 38–116)
ALT SERPL W P-5'-P-CCNC: 11 U/L (ref 0–33)
ANION GAP SERPL CALCULATED.3IONS-SCNC: 11.7 MMOL/L (ref 5–15)
AST SERPL-CCNC: 12 U/L (ref 0–32)
BASOPHILS # BLD AUTO: 0.04 10*3/MM3 (ref 0–0.2)
BASOPHILS NFR BLD AUTO: 0.7 % (ref 0–1.5)
BILIRUB SERPL-MCNC: 0.2 MG/DL (ref 0.2–1.2)
BUN SERPL-MCNC: 12 MG/DL (ref 6–20)
BUN/CREAT SERPL: 11.8 (ref 7.3–30)
CALCIUM SPEC-SCNC: 8.6 MG/DL (ref 8.5–10.2)
CHLORIDE SERPL-SCNC: 106 MMOL/L (ref 98–107)
CO2 SERPL-SCNC: 21.3 MMOL/L (ref 22–29)
CREAT SERPL-MCNC: 1.02 MG/DL (ref 0.6–1.1)
DEPRECATED RDW RBC AUTO: 46.4 FL (ref 37–54)
EGFRCR SERPLBLD CKD-EPI 2021: 63.5 ML/MIN/1.73
EOSINOPHIL # BLD AUTO: 0.02 10*3/MM3 (ref 0–0.4)
EOSINOPHIL NFR BLD AUTO: 0.3 % (ref 0.3–6.2)
ERYTHROCYTE [DISTWIDTH] IN BLOOD BY AUTOMATED COUNT: 13.2 % (ref 12.3–15.4)
GLOBULIN UR ELPH-MCNC: 3.9 GM/DL (ref 1.8–3.5)
GLUCOSE SERPL-MCNC: 117 MG/DL (ref 74–124)
HCT VFR BLD AUTO: 33.3 % (ref 34–46.6)
HGB BLD-MCNC: 10 G/DL (ref 12–15.9)
IMM GRANULOCYTES # BLD AUTO: 0.08 10*3/MM3 (ref 0–0.05)
IMM GRANULOCYTES NFR BLD AUTO: 1.4 % (ref 0–0.5)
LYMPHOCYTES # BLD AUTO: 0.78 10*3/MM3 (ref 0.7–3.1)
LYMPHOCYTES NFR BLD AUTO: 13.3 % (ref 19.6–45.3)
MCH RBC QN AUTO: 29.1 PG (ref 26.6–33)
MCHC RBC AUTO-ENTMCNC: 30 G/DL (ref 31.5–35.7)
MCV RBC AUTO: 96.8 FL (ref 79–97)
MONOCYTES # BLD AUTO: 0.57 10*3/MM3 (ref 0.1–0.9)
MONOCYTES NFR BLD AUTO: 9.7 % (ref 5–12)
NEUTROPHILS NFR BLD AUTO: 4.39 10*3/MM3 (ref 1.7–7)
NEUTROPHILS NFR BLD AUTO: 74.6 % (ref 42.7–76)
NRBC BLD AUTO-RTO: 0 /100 WBC (ref 0–0.2)
PLATELET # BLD AUTO: 268 10*3/MM3 (ref 140–450)
PMV BLD AUTO: 9.2 FL (ref 6–12)
POTASSIUM SERPL-SCNC: 4 MMOL/L (ref 3.5–4.7)
PROT SERPL-MCNC: 7.6 G/DL (ref 6.3–8)
RBC # BLD AUTO: 3.44 10*6/MM3 (ref 3.77–5.28)
SODIUM SERPL-SCNC: 139 MMOL/L (ref 134–145)
WBC NRBC COR # BLD: 5.88 10*3/MM3 (ref 3.4–10.8)

## 2022-08-16 PROCEDURE — 85025 COMPLETE CBC W/AUTO DIFF WBC: CPT

## 2022-08-16 PROCEDURE — 99215 OFFICE O/P EST HI 40 MIN: CPT | Performed by: INTERNAL MEDICINE

## 2022-08-16 PROCEDURE — 80053 COMPREHEN METABOLIC PANEL: CPT

## 2022-08-16 PROCEDURE — 96375 TX/PRO/DX INJ NEW DRUG ADDON: CPT

## 2022-08-16 PROCEDURE — 96367 TX/PROPH/DG ADDL SEQ IV INF: CPT

## 2022-08-16 PROCEDURE — 25010000002 DIPHENHYDRAMINE PER 50 MG: Performed by: INTERNAL MEDICINE

## 2022-08-16 PROCEDURE — 25010000002 DOCETAXEL 20 MG/ML SOLUTION 8 ML VIAL: Performed by: INTERNAL MEDICINE

## 2022-08-16 PROCEDURE — 96413 CHEMO IV INFUSION 1 HR: CPT

## 2022-08-16 RX ORDER — OXYCODONE AND ACETAMINOPHEN 10; 325 MG/1; MG/1
1 TABLET ORAL EVERY 4 HOURS PRN
Qty: 120 TABLET | Refills: 0 | Status: SHIPPED | OUTPATIENT
Start: 2022-08-16 | End: 2022-09-12 | Stop reason: ALTCHOICE

## 2022-08-16 RX ORDER — FAMOTIDINE 10 MG/ML
20 INJECTION, SOLUTION INTRAVENOUS AS NEEDED
Status: CANCELLED | OUTPATIENT
Start: 2022-08-16

## 2022-08-16 RX ORDER — SODIUM CHLORIDE 9 MG/ML
250 INJECTION, SOLUTION INTRAVENOUS ONCE
Status: COMPLETED | OUTPATIENT
Start: 2022-08-16 | End: 2022-08-16

## 2022-08-16 RX ORDER — DIPHENHYDRAMINE HYDROCHLORIDE 50 MG/ML
50 INJECTION INTRAMUSCULAR; INTRAVENOUS AS NEEDED
Status: CANCELLED | OUTPATIENT
Start: 2022-08-16

## 2022-08-16 RX ORDER — SODIUM CHLORIDE 9 MG/ML
1000 INJECTION, SOLUTION INTRAVENOUS ONCE
Status: CANCELLED
Start: 2022-08-23 | End: 2022-08-23

## 2022-08-16 RX ORDER — SODIUM CHLORIDE 9 MG/ML
250 INJECTION, SOLUTION INTRAVENOUS ONCE
Status: CANCELLED | OUTPATIENT
Start: 2022-08-16

## 2022-08-16 RX ORDER — FAMOTIDINE 10 MG/ML
20 INJECTION, SOLUTION INTRAVENOUS ONCE
Status: CANCELLED | OUTPATIENT
Start: 2022-08-16

## 2022-08-16 RX ORDER — FAMOTIDINE 10 MG/ML
20 INJECTION, SOLUTION INTRAVENOUS ONCE
Status: COMPLETED | OUTPATIENT
Start: 2022-08-16 | End: 2022-08-16

## 2022-08-16 RX ADMIN — FAMOTIDINE 20 MG: 10 INJECTION, SOLUTION INTRAVENOUS at 10:10

## 2022-08-16 RX ADMIN — SODIUM CHLORIDE 250 ML: 9 INJECTION, SOLUTION INTRAVENOUS at 10:13

## 2022-08-16 RX ADMIN — DIPHENHYDRAMINE HYDROCHLORIDE 25 MG: 50 INJECTION, SOLUTION INTRAMUSCULAR; INTRAVENOUS at 10:12

## 2022-08-16 RX ADMIN — DOCETAXEL 135 MG: 20 INJECTION, SOLUTION, CONCENTRATE INTRAVENOUS at 11:02

## 2022-08-16 NOTE — PROGRESS NOTES
Subjective   Holli Patel is a 57 y.o. female.  Referred by Dr. Molina for bilateral breast cancer    History of Present Illness   Ms. 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    3/1/2021-bilateral screening mammogram-microcalcifications seen in the posterior one third of the lateral aspect of the right breast.  Area of focal asymmetry seen in the middle one third of the upper inner quadrant of the left breast.    3/1/2021-DEXA scan-T score of -2.2 on the lumbar spine and T score of -2.3 in the left hip and T score of -1.9 in the right hip.  Findings consistent with osteopenia    3/23/2021-screening lung CT-there is a 10 x 11 mm solid nodule in the left upper lobe.  Enlarged AP window lymph node measuring 14 x 10 mm.  Suggestion of a possible 9 mm left hilar lymph node.  Heavy coronary artery calcification.    3/23/2021-diagnostic mammogram bilateral-cluster of microcalcifications in the middle third lateral aspect of the right breast.  Left breast demonstrates persistence of the area of focal asymmetry in the region.    Ultrasound-left breast ultrasound at 10 o'clock position, 6 cm from the nipple there is a 0.4 cm irregular hypoechoic lesion.  Stereotactic biopsy of the right breast calcifications recommended.  Ultrasound-guided biopsy of the left breast lesion recommended    4/7/2021-right breast stereotactic biopsy and left breast ultrasound-guided biopsy  Pathology  Right breast-invasive ductal carcinoma, grade 2, lymphovascular invasion present, ER +99% strong, MS +80% moderate, HER-2 negative, Ki-67 12%  Left breast upper inner quadrant 10 o'clock position biopsy, invasive ductal carcinoma, grade   2, ER +99% strong, MS +40% strong, HER-2 2+ on immunohistochemistry, nonamplified on FISH Ki-67 10%    4/14/2021-PET/CT-FDG avid aortopulmonary window lymph node measures 0.9 cm with an SUV of 7.5.  FDG avid left hilar  lymph node measures 1 cm with an SUV of 17.2.  Irregular soft tissue density in the right breast measuring 3.7 x 3.8 cm is favored to be secondary to the recent breast biopsy.  1.1 cm pulmonary nodule within the left upper lobe with SUV 9.7 .  Sub-6 mm pulmonary nodules are present in the right lower lobe.  No evidence of lymphadenopathy or metastatic disease in the abdomen.  Focal area of FDG uptake within the central canal posterior to T11-T12 displaced demonstrating an SUV of 5.5 thought to be reactive however MRI is recommended for further evaluation.    She was seen by Dr. Molina who initially planned for breast surgery however  due to the PET abnormalities she has been referred to Dr. Kerr who plans to do a wound on 4/30/2021.  Dr. Molina's and Dr. Kerr's notes reviewed.    Patient denies any family history of breast cancer.  Her mother had lymphoma.  Maternal grandmother had colon cancer.  Prior to that screening mammogram she did not have any palpable abnormalities of either breast.    She has been a heavy smoker for about 40 years and smoked 2 packs/day.  Denies any recent weight changes, new bone pains, cough, abdominal pain nausea vomiting constipation or diarrhea.  She does have anxiety and has been on several medications.  She has recently been started on Xanax to help with the mood and also with insomnia.    Patient had a video-assisted thoracoscopy and mediastinal lymphadenectomy on 4/30/2021.  L5-L6 lymph nodes were biopsied however the small pulmonary nodule in the left upper lobe was not difficult to find.  Pathology showed moderately differentiated adenocarcinoma which is CK7 and TTF-1 positive.  Consistent with lung primary.  Ki-67 85%.    5/14/2021-MRI of the brain-minimal chronic small vessel ischemic change in the white matter.  Otherwise negative MRI.    5/20/2021-bilateral axillary ultrasound-no evidence of axillary lymphadenopathy in either axilla.  Normal-appearing bilateral axillary  lymph nodes visualized.    Cycle 1 cisplatin and Alimta on 5/25/2021.    Cycle 2 cisplatin Alimta 6/15/2021    Cycle 3 cisplatin Alimta 7/7/2021    Completed radiation on 7/12/2021    Port was nonfunctional hence a port study was performed which showed that the port was backed up into the innominate vein and also there was a nonocclusive thrombus at the end of the catheter extending into the superior vena cava.  She was started on anticoagulation with Eliquis.  It was recommended for port revision of the intention to keep the port.    PET/CT 8/5/2021-images independently reviewed and interpreted by me-decrease in size and FDG uptake of the left upper lobe pulmonary nodule as well as mediastinal and left hilar lymphadenopathy representing response to treatment.  Sub-6 mm pulmonary nodule in the left upper lobe new since 4/14/2021.  This could be related to radiation however follow-up CT in 3 months recommended.  Decrease in size of the irregular masslike tissue in the right breast which is postbiopsy hematoma.  This is not PET avid.  New segmental left eighth rib fractures healing.  A new band of sclerosis of the left seventh rib which favored to represent healing nondisplaced fracture.  Follow-up CT recommended.  focal uptake in the duodenum first and second portions.  Could be related to duodenitis.  Indeterminate lesion in the L1 vertebral body not well evaluated on PET/CT.    10/1/2021-MRI of the lumbar spine.  Lesion in the left posterior body of L1 measures 14 x 14 x 12 mm and previously 5 x 5 x 6 mm.  The interval enlargement strongly suggest that it is metastatic lesion.  No additional osseous metastasis noted.   Other chronic changes noted.    10/14/2021-biopsy of the lumbar spine lesion-pathology consistent with poorly differentiated carcinoma.  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    10/26/2021-brain MRI-no evidence of  metastatic disease.    10/26/2021-bilateral diagnostic mammogram and ultrasound  Scattered fibroglandular density in both breast.  Postbiopsy hematoma with internal biopsy clip in the upper outer quadrant of the right breast, 8 cm from the nipple.  The hematoma size is decreased to 2.9 cm from 3.6 cm earlier.    Left breast-parenchymal density measuring 9 x 10 mm has markedly decreased.  Few adjacent calcifications which are unchanged.  No new abnormality in the left breast.  At 10:00 region there is some minimal adjacent hypoechoic texture which is difficult to measure but appears smaller since the previous exam.    10/28/2021-PET/CT  New L1 and L2 lytic bone metastasis annually intensely hypermetabolic focus at the left adrenal gland likely represents metastatic disease as well.  Slight decrease in size of the 0.9 x 0.7 cm left upper lobe pulmonary nodule.  There is hypermetabolic activity related to radiation pneumonitis.  The remainder of the nodule is photopenic.  Uncertain etiology of the more intense activity along the right lateral peripheral margin of the 2.6 cm postbiopsy density of the right breast.    She completed radiation to to the lumbar spine on 11/19/2021 11/22/2021-cycle 1 Alimta and Keytruda    3/23/2022-bilateral diagnostic mammogram and ultrasound  Complete resolution of the microcalcifications in the upper outer quadrant of the right breast and decrease in size of the postbiopsy hematoma.  No suspicious abnormalities in the right breast.  Benign-appearing calcifications at the site of malignancy in the left breast upper inner quadrant.  No other suspicious findings.    4/11/2022 PET/CT-there is new groundglass opacities in the left upper lobe which are most likely postradiation changes.  Other groundglass opacities in the left lung as well as the right lung remained stable.  Increased nodularity of the left adrenal gland with an SUV of 5.6, previously 3.5.    Increased uptake in the L1  vertebra in the right posterior aspect with an SUV of 3.6.  Left L1 sclerosis increased    Due to this the case was discussed in multidisciplinary conference.  The disease progression was significant in the left adrenal gland as well as the L1 vertebra.  Since there were only 2 areas of disease progression it has been decided to proceed with radiation to these 2 spots and continue on Keytruda and Alimta.    Patient also has had worsening of her kidney function.  We initially held treatment as of 5/31/2022 and creatinine bumped up to 2.0, BUN 23.  We then resumed therapy 6/11/2022 with Keytruda alone.  Patient was referred to nephrology.    6/6/2022-MRI of the spine-diffuse marrow replacement in the L1 vertebral body and inferior endplate concavity.  Suspicious for metastatic disease with pathological fracture in the inferior endplate.  Also diffuse signal abnormality in the L2 lamina on the left suspicious for additional metastatic disease.  Abnormality of the first sacral segment suspicious for metastatic disease.  Left adrenal gland appears enlarged.  30 to 40% height loss and L2 vertebral body suggestive of a subacute compression fracture.  Degenerative changes.    7/18/2022-PET/CT  Multiple hypermetabolic osseous lesions with index lesions which have either increased in degree of FDG uptake or newly hypermetabolic lesions representative of metastatic disease and progression of disease.  Possible FDG avid lesion in the left femoral diaphysis however these are incompletely visualized and in the area of artifactual FDG uptake.  MRI of the left femur recommended for further evaluation.  Increasing FDG uptake of the left adrenal gland.  Focal left hilar hypermetabolic him corresponds to the lymph node which has minimally increased in size compared to April 2022.  New sub-6 mm pulmonary nodules.    7/26/2022-cycle 1 of Taxotere    Patient was admitted to the hospital 8/3/2021 discharged on 8/9/2022.  She was admitted  for strokelike symptoms and confusion.  The confusion was thought to be secondary to polypharmacy and probably febrile neutropenia.  She was treated with antibiotics.  Mental status improved subsequently.  MRI of the cervical thoracic and lumbar spine was performed on 8/4/2022.  Images independently reviewed by me.  Multiple metastatic lesions in the spine noted.  There was a lesion on the CT which was concerning.  There is also a sacral Lesion measuring up to 5 cm in transverse dimension on the right.  Patient was symptomatic with pain on the right side of the sacrum.  Radiation oncology recommended radiation to the cervical spine as well as sacrum.  CT head without any obvious abnormalities.    Completed radiation to the cervical spine and sacrum on 8/12/2022.    Interval history:  Holli Patel is a 57 y.o. female with the above-mentioned history returns to the office today for scheduled follow-up.    She presents today for follow-up.  Accompanied by her .  She has lost about 8 pounds since her last visit.  She reports a decreased appetite and altered taste.  She has not been eating much.  She is mainly been eating soups.    Neuropathy is not any worse.  She continues gabapentin 300 mg 3 times daily.  She is currently using Percocet 10 mg every 4 hours as needed for pain.  Previously we tried using morphine but she reports feeling extremely tired and lethargic with it.  In fact the day she was admitted to the hospital she took long-acting morphine which might have contributed to the altered mental status.  She prefers to continue on Percocet only at this time without a long-acting pain medication.  She continues to experience pain in the right hip.    The following portions of the patient's history were reviewed and updated as appropriate: allergies, current medications, past family history, past medical history, past social history, past surgical history and problem list.    Past Medical History:    Diagnosis Date   • Anxiety    • Clot     POSSIBLE BLOOD CLOT IN VENOUS ACCESS DEVICE-PT STATES WAS STARTED ON ELIQUIS    • Dyspnea on exertion    • Elevated cholesterol    • Frequent urination     DAY AND NIGHT   • SHAYY (generalized anxiety disorder)     RELATED HIGH BLOOD PRESSURE   • GERD (gastroesophageal reflux disease)    • High blood pressure     ANXIETY RELATED   • Hyperlipidemia    • Hypothyroidism    • Lung cancer (HCC)    • Lung nodule     LEFT   • Malignant neoplasm of upper-outer quadrant of right breast in female, estrogen receptor positive (HCC) 4/13/2021    PT STATES HAS BILAT BREAST CANCER   • Migraine    • PONV (postoperative nausea and vomiting)         Past Surgical History:   Procedure Laterality Date   • BREAST BIOPSY Bilateral 04/07/2021    Right Breast 10 o'clock & Left breast 10 o'clock 6 cm from nipple, [facility]   • CLOSED REDUCTION HIP DISLOCATION Left 4/30/2021    Procedure: BRONCHOSCOPY, LEFT VIDEO ASSITED THORACOSCOPY, ROBOTIC ASSSITED MEDIASTINAL LYMPHADENECTOMY WITH INTERCOSTAL NERVE BLOCKS;  Surgeon: Raphael Kerr III, MD;  Location: [facility];  Service: DaVinci;  Laterality: Left;   • COLONOSCOPY     • TUBAL ABDOMINAL LIGATION     • VENOUS ACCESS DEVICE (PORT) INSERTION Right 5/20/2021    Procedure: INSERTION VENOUS ACCESS DEVICE;  Surgeon: Raphael Kerr III, MD;  Location: [facility];  Service: Thoracic;  Laterality: Right;   • VENOUS ACCESS DEVICE (PORT) INSERTION Right 11/29/2021    Procedure: REVISION AND REPLACEMENT RIGHT SUBCLAVIAN VENOUS ACCESS PORT;  Surgeon: Raphael Kerr III, MD;  Location: [facility] OR;  Service: Thoracic;  Laterality: Right;   • WRIST SURGERY Right         Family History   Problem Relation Age of Onset   • Cancer Father         LYMPHATIC   • Prostate cancer Father    • Lymphoma Father    • Alcohol abuse Brother    • Colon cancer Maternal Grandmother    • Malig Hyperthermia Neg Hx         Social History     Socioeconomic History   •  "Marital status:      Spouse name: Jelani   Tobacco Use   • Smoking status: Former Smoker     Packs/day: 1.00     Years: 30.00     Pack years: 30.00     Types: Electronic Cigarette, Cigarettes     Start date: 1981     Quit date:      Years since quittin.6   • Smokeless tobacco: Never Used   • Tobacco comment: ABT 15 CIGARETTES DAILY   Vaping Use   • Vaping Use: Some days   • Substances: Nicotine, Flavoring   • Devices: Disposable   Substance and Sexual Activity   • Alcohol use: Never   • Drug use: Never   • Sexual activity: Yes     Partners: Male        OB History        2    Para   2    Term   2            AB        Living           SAB        IAB        Ectopic        Molar        Multiple        Live Births                Age of menarche-10  Age at menopause-42  Oral contraceptive pill use-15 years  No HRT use  She has 2 children  Age at first live childbirth-17    No Known Allergies     Review of Systems   Review of systems as mentioned in the HPI    Objective   Blood pressure 127/88, pulse 60, temperature 97.3 °F (36.3 °C), temperature source Temporal, resp. rate 16, height 160 cm (62.99\"), weight 71.7 kg (158 lb), SpO2 97 %, not currently breastfeeding.       Physical Exam  Vitals reviewed.   HENT:      Head: Normocephalic.   Eyes:      Pupils: Pupils are equal, round, and reactive to light.   Cardiovascular:      Rate and Rhythm: Normal rate and regular rhythm.      Pulses: Normal pulses.      Heart sounds: Normal heart sounds.   Pulmonary:      Effort: Pulmonary effort is normal.      Breath sounds: Normal breath sounds.   Abdominal:      General: Abdomen is flat.   Musculoskeletal:         General: No swelling. Normal range of motion.      Cervical back: Normal range of motion.   Skin:     General: Skin is warm.   Neurological:      General: No focal deficit present.      Mental Status: She is alert and oriented to person, place, and time.   Psychiatric:         Mood and " Affect: Mood normal.         Behavior: Behavior normal.       I have reexamined the patient and the results are consistent with the previously documented exam. Salma Snell MD     Results from last 7 days   Lab Units 08/16/22  0827   WBC 10*3/mm3 5.88   NEUTROS ABS 10*3/mm3 4.39   HEMOGLOBIN g/dL 10.0*   HEMATOCRIT % 33.3*   PLATELETS 10*3/mm3 268     Results from last 7 days   Lab Units 08/16/22  0827   SODIUM mmol/L 139   POTASSIUM mmol/L 4.0   CHLORIDE mmol/L 106   CO2 mmol/L 21.3*   BUN mg/dL 12   CREATININE mg/dL 1.02   CALCIUM mg/dL 8.6   ALBUMIN g/dL 3.70   BILIRUBIN mg/dL 0.2   ALK PHOS U/L 130*   ALT (SGPT) U/L 11   AST (SGOT) U/L 12   GLUCOSE mg/dL 117              CT Head Without Contrast    Result Date: 8/4/2022  1.  No evidence of acute infarction or of intracranial hemorrhage. No obvious metastatic disease is identified intracranially on this noncontrasted CT examination of the brain. Further evaluation could be performed with a MRI examination of the brain with and without contrast. 2.  The PET examination 07/18/2022 demonstrated a lytic lesion involving the lamina of C2 on the right posteriorly extending to the anterior aspect of the spinous process measuring 17 mm in size. This is new versus 04/11/2022. Further evaluation with a MRI examination of the cervical spine with and without contrast is recommended.  The above information was called to and discussed with Conor Kelly.    Radiation dose reduction techniques were utilized, including automated exposure control and exposure modulation based on body size.  This report was finalized on 8/4/2022 7:30 AM by Dr. Brenton Parham M.D.      CT Cervical Spine Without Contrast    Result Date: 8/8/2022  There is a lytic lesion involving the posterior elements of C2 involving predominantly the mid and posterior aspect of the right lamina, extending to the spinous process and to the posterior aspect of the left lamina. There is extensive cortical disruption  involving the lamina on the right posteriorly. There is no evidence of a discrete fracture involving the right or left lamina. The posterior lamina of C1 on the right is disrupted anteriorly posteriorly, extending to the midline. There was a mild degree of epidural enhancement posteriorly. The appearance is nonspecific. 1 to 1.5 mm thick area of epidural extension is suspected at this level. A follow-up MRI examination cervical spine with and without contrast is suggested.    Radiation dose reduction techniques were utilized, including automated exposure control and exposure modulation based on body size.  This report was finalized on 8/8/2022 9:47 AM by Dr. Brenton Parham M.D.      MRI Brain With & Without Contrast    Result Date: 8/5/2022  The study is hampered somewhat by patient motion but there was no evidence of enhancement to suggest calvarial or intra-axial metastatic disease. Mild small vessel ischemic disease is noted. There is no evidence of acute infarction.   MRI EXAMINATION OF THE CERVICAL SPINE WITH AND WITHOUT CONTRAST:  The study is hampered by patient motion.  The alignment of the cervical spine is within normal limits. Signal intensity within the cord is normal on the sagittal T2 sequence.  The sagittal T2 sequence demonstrates increased signal intensity involving the lamina of C2 on the right, extending to and involving the spinous process as well as crossing midline to the left involving the posterior aspect of the C2 lamina on the left. The fat-saturated sequence demonstrates increased signal intensity involving the soft tissues adjacent to the lamina and spinous process of C2. The area of T2 hyperintensity within the soft tissues measures approximately 1.5 cm in cranial caudal dimension and 1.6 cm in AP dimension. After contrast administration.  There was enhancement of the lamina of C2 bilaterally, more prominent on the right as well as involving the spinous process. There is dural  enhancement extending from the level of the inferior aspect of C2 to the inferior aspect of C1 posteriorly. The axial T1 noncontrast sequence demonstrates linear areas of signal loss involving the posterior laminar bilaterally. Pathologic fractures cannot be excluded.  C2-3: There is no evidence of disc bulge or herniation.  C3-4: There is no evidence of disc bulge or herniation.  C4-5: A minimal central disc bulge is present.  C5-6: Mild facet degenerative disease is present bilaterally.  C6-7: There is no evidence of disc bulge or herniation.  C7-T1: There is no evidence of disc bulge or herniation.  IMPRESSION: 1.  Edema and enhancement involving the lamina of C2 to the right extending to and involving the spinous process and posterior aspect of the lamina of C2 is appreciated consistent with metastatic disease. There is also adjacent edema and enhancement within the soft tissues adjacent to the posterior elements of C2. There is mild dural enhancement posteriorly from the inferior aspect of the posterior arch of C1 to the inferior aspect of C2. The appearance is nonspecific. The degree of T2 hyperintensity and enhancement is more than typically seen with a metastatic lesion. The axial T1 precontrast sequence demonstrates linear areas of signal loss involving the lamina bilaterally. Fractures cannot be excluded. A CT examination of the cervical spine without contrast is suggested. 2.  Mild degenerative disease involving the cervical spine as described with no evidence of disc herniation. 3.  No evidence of cord signal abnormality or cord compression.   MRI EXAMINATION OF THE THORACIC SPINE WITH AND WITHOUT CONTRAST:  The alignment of the thoracic spine is within normal limits. There is a compression deformity involving the T7 vertebral body with loss of approximately 50% of vertical height anteriorly. Loss of vertical height appears similar to the PET examination of 07/18/2022, the PET examination of 04/11/2022,  but is new versus the PET examination of 01/18/2022. There was mild edema and enhancement involving the superior endplate of T7 anteriorly.  A 5 mm area of enhancement is appreciated involving the superior aspect of the spinous process of T12 consistent with a metastatic lesion.  There is no evidence of disc herniation, canal stenosis or cord compression. No other areas of enhancement to suggest metastatic disease are identified.  IMPRESSION: 1.  There is a compression deformity involving T7 with mild edema and enhancement suggesting an acute to subacute fracture involving the superior endplate anteriorly. This is new as compared to the PET examination of 01/18/2022 but loss of vertical height appears similar to the PET examination of 04/11/2022. The appearance is nonspecific. This may represent a benign osteoporotic compression fracture. Underlying metastatic disease cannot be entirely excluded. 2.  There is a 5 mm metastatic lesion involving the superior aspect of the spinous process of T12. 3.  No evidence of intradural enhancement, cord compression or cord signal abnormality. There is no evidence of disc herniation.   MRI EXAMINATION OF THE LUMBAR SPINE WITH AND WITHOUT CONTRAST:  The alignment of the lumbar spine is within normal limits. There is increased signal intensity involving the marrow of the lumbar spine which suggests radiation related changes. Signal loss throughout the L1 vertebral body is noted on the T1 sequence consistent with metastatic disease. There is a mild concave deformity involving the inferior endplate of L1 and to a lesser extent superior endplate of L1. A fracture plane is noted paralleling the inferior endplate. Edema tracks into the pedicles bilaterally. Signal loss and enhancement is appreciated involving the superior articulating facet of L2 to the left consistent with metastatic disease. A 5 mm metastatic lesion involving the spinous process of T12 superiorly is again noted. A  large metastatic lesion is appreciated involving the sacral ala on the right. This measures approximately 5 cm in transverse dimension.  The conus is at T12-L1 and the caudal aspect of the spinal cord appears unremarkable.  T12-L1: There is no evidence of disc bulge or herniation.  L1-L2: A mild broad-based disc osteophyte complex is present which is more prominent to the right.  L2-L3: There is no evidence of disc bulge or herniation.  L3-L4: A mild broad-based disc bulge is present with no evidence of herniation.  L4-L5: Mild facet degenerative disease is present bilaterally. A mild central disc osteophyte complex is present with no evidence of herniation.  L5-S1: Moderate-to-severe facet degenerative disease is present bilaterally. Moderate foraminal stenosis is present on the right and there is moderate-to-severe foraminal stenosis on the left secondary to loss of disc height and extension of a disc osteophyte complex into the neural foramen.  IMPRESSION: 1.  Multifocal metastatic disease involving the lumbar spine and sacrum is noted as described above with the largest lesion involving the sacral ala to the right measuring approximately 5 cm in transverse dimension. Metastatic involvement of L1 is appreciated throughout the vertebral body and extending to the pedicles bilaterally. There is no evidence of epidural extension. Smaller metastatic foci are appreciated involving the superior articulating facet of L1 to the left and the superior aspect of the spinous process of T12. 2.  Loss of vertical height at L2 is appreciated estimated to be approximately 25% in severity. There is edema paralleling the superior endplate. The mild compression deformity at L2 is new versus the MRI examination of 10/01/2021.  The above information was called to and discussed with Dr. Jade on 08/04/2022 at 1240 hours.  This report was finalized on 8/5/2022 9:05 AM by Dr. Brenton Parham M.D.      MRI Cervical Spine With & Without  Contrast    Result Date: 8/5/2022  The study is hampered somewhat by patient motion but there was no evidence of enhancement to suggest calvarial or intra-axial metastatic disease. Mild small vessel ischemic disease is noted. There is no evidence of acute infarction.   MRI EXAMINATION OF THE CERVICAL SPINE WITH AND WITHOUT CONTRAST:  The study is hampered by patient motion.  The alignment of the cervical spine is within normal limits. Signal intensity within the cord is normal on the sagittal T2 sequence.  The sagittal T2 sequence demonstrates increased signal intensity involving the lamina of C2 on the right, extending to and involving the spinous process as well as crossing midline to the left involving the posterior aspect of the C2 lamina on the left. The fat-saturated sequence demonstrates increased signal intensity involving the soft tissues adjacent to the lamina and spinous process of C2. The area of T2 hyperintensity within the soft tissues measures approximately 1.5 cm in cranial caudal dimension and 1.6 cm in AP dimension. After contrast administration.  There was enhancement of the lamina of C2 bilaterally, more prominent on the right as well as involving the spinous process. There is dural enhancement extending from the level of the inferior aspect of C2 to the inferior aspect of C1 posteriorly. The axial T1 noncontrast sequence demonstrates linear areas of signal loss involving the posterior laminar bilaterally. Pathologic fractures cannot be excluded.  C2-3: There is no evidence of disc bulge or herniation.  C3-4: There is no evidence of disc bulge or herniation.  C4-5: A minimal central disc bulge is present.  C5-6: Mild facet degenerative disease is present bilaterally.  C6-7: There is no evidence of disc bulge or herniation.  C7-T1: There is no evidence of disc bulge or herniation.  IMPRESSION: 1.  Edema and enhancement involving the lamina of C2 to the right extending to and involving the spinous  process and posterior aspect of the lamina of C2 is appreciated consistent with metastatic disease. There is also adjacent edema and enhancement within the soft tissues adjacent to the posterior elements of C2. There is mild dural enhancement posteriorly from the inferior aspect of the posterior arch of C1 to the inferior aspect of C2. The appearance is nonspecific. The degree of T2 hyperintensity and enhancement is more than typically seen with a metastatic lesion. The axial T1 precontrast sequence demonstrates linear areas of signal loss involving the lamina bilaterally. Fractures cannot be excluded. A CT examination of the cervical spine without contrast is suggested. 2.  Mild degenerative disease involving the cervical spine as described with no evidence of disc herniation. 3.  No evidence of cord signal abnormality or cord compression.   MRI EXAMINATION OF THE THORACIC SPINE WITH AND WITHOUT CONTRAST:  The alignment of the thoracic spine is within normal limits. There is a compression deformity involving the T7 vertebral body with loss of approximately 50% of vertical height anteriorly. Loss of vertical height appears similar to the PET examination of 07/18/2022, the PET examination of 04/11/2022, but is new versus the PET examination of 01/18/2022. There was mild edema and enhancement involving the superior endplate of T7 anteriorly.  A 5 mm area of enhancement is appreciated involving the superior aspect of the spinous process of T12 consistent with a metastatic lesion.  There is no evidence of disc herniation, canal stenosis or cord compression. No other areas of enhancement to suggest metastatic disease are identified.  IMPRESSION: 1.  There is a compression deformity involving T7 with mild edema and enhancement suggesting an acute to subacute fracture involving the superior endplate anteriorly. This is new as compared to the PET examination of 01/18/2022 but loss of vertical height appears similar to the  PET examination of 04/11/2022. The appearance is nonspecific. This may represent a benign osteoporotic compression fracture. Underlying metastatic disease cannot be entirely excluded. 2.  There is a 5 mm metastatic lesion involving the superior aspect of the spinous process of T12. 3.  No evidence of intradural enhancement, cord compression or cord signal abnormality. There is no evidence of disc herniation.   MRI EXAMINATION OF THE LUMBAR SPINE WITH AND WITHOUT CONTRAST:  The alignment of the lumbar spine is within normal limits. There is increased signal intensity involving the marrow of the lumbar spine which suggests radiation related changes. Signal loss throughout the L1 vertebral body is noted on the T1 sequence consistent with metastatic disease. There is a mild concave deformity involving the inferior endplate of L1 and to a lesser extent superior endplate of L1. A fracture plane is noted paralleling the inferior endplate. Edema tracks into the pedicles bilaterally. Signal loss and enhancement is appreciated involving the superior articulating facet of L2 to the left consistent with metastatic disease. A 5 mm metastatic lesion involving the spinous process of T12 superiorly is again noted. A large metastatic lesion is appreciated involving the sacral ala on the right. This measures approximately 5 cm in transverse dimension.  The conus is at T12-L1 and the caudal aspect of the spinal cord appears unremarkable.  T12-L1: There is no evidence of disc bulge or herniation.  L1-L2: A mild broad-based disc osteophyte complex is present which is more prominent to the right.  L2-L3: There is no evidence of disc bulge or herniation.  L3-L4: A mild broad-based disc bulge is present with no evidence of herniation.  L4-L5: Mild facet degenerative disease is present bilaterally. A mild central disc osteophyte complex is present with no evidence of herniation.  L5-S1: Moderate-to-severe facet degenerative disease is  present bilaterally. Moderate foraminal stenosis is present on the right and there is moderate-to-severe foraminal stenosis on the left secondary to loss of disc height and extension of a disc osteophyte complex into the neural foramen.  IMPRESSION: 1.  Multifocal metastatic disease involving the lumbar spine and sacrum is noted as described above with the largest lesion involving the sacral ala to the right measuring approximately 5 cm in transverse dimension. Metastatic involvement of L1 is appreciated throughout the vertebral body and extending to the pedicles bilaterally. There is no evidence of epidural extension. Smaller metastatic foci are appreciated involving the superior articulating facet of L1 to the left and the superior aspect of the spinous process of T12. 2.  Loss of vertical height at L2 is appreciated estimated to be approximately 25% in severity. There is edema paralleling the superior endplate. The mild compression deformity at L2 is new versus the MRI examination of 10/01/2021.  The above information was called to and discussed with Dr. Jade on 08/04/2022 at 1240 hours.  This report was finalized on 8/5/2022 9:05 AM by Dr. Brenton Parham M.D.      MRI Thoracic Spine With & Without Contrast    Result Date: 8/5/2022  The study is hampered somewhat by patient motion but there was no evidence of enhancement to suggest calvarial or intra-axial metastatic disease. Mild small vessel ischemic disease is noted. There is no evidence of acute infarction.   MRI EXAMINATION OF THE CERVICAL SPINE WITH AND WITHOUT CONTRAST:  The study is hampered by patient motion.  The alignment of the cervical spine is within normal limits. Signal intensity within the cord is normal on the sagittal T2 sequence.  The sagittal T2 sequence demonstrates increased signal intensity involving the lamina of C2 on the right, extending to and involving the spinous process as well as crossing midline to the left involving the posterior  aspect of the C2 lamina on the left. The fat-saturated sequence demonstrates increased signal intensity involving the soft tissues adjacent to the lamina and spinous process of C2. The area of T2 hyperintensity within the soft tissues measures approximately 1.5 cm in cranial caudal dimension and 1.6 cm in AP dimension. After contrast administration.  There was enhancement of the lamina of C2 bilaterally, more prominent on the right as well as involving the spinous process. There is dural enhancement extending from the level of the inferior aspect of C2 to the inferior aspect of C1 posteriorly. The axial T1 noncontrast sequence demonstrates linear areas of signal loss involving the posterior laminar bilaterally. Pathologic fractures cannot be excluded.  C2-3: There is no evidence of disc bulge or herniation.  C3-4: There is no evidence of disc bulge or herniation.  C4-5: A minimal central disc bulge is present.  C5-6: Mild facet degenerative disease is present bilaterally.  C6-7: There is no evidence of disc bulge or herniation.  C7-T1: There is no evidence of disc bulge or herniation.  IMPRESSION: 1.  Edema and enhancement involving the lamina of C2 to the right extending to and involving the spinous process and posterior aspect of the lamina of C2 is appreciated consistent with metastatic disease. There is also adjacent edema and enhancement within the soft tissues adjacent to the posterior elements of C2. There is mild dural enhancement posteriorly from the inferior aspect of the posterior arch of C1 to the inferior aspect of C2. The appearance is nonspecific. The degree of T2 hyperintensity and enhancement is more than typically seen with a metastatic lesion. The axial T1 precontrast sequence demonstrates linear areas of signal loss involving the lamina bilaterally. Fractures cannot be excluded. A CT examination of the cervical spine without contrast is suggested. 2.  Mild degenerative disease involving the  cervical spine as described with no evidence of disc herniation. 3.  No evidence of cord signal abnormality or cord compression.   MRI EXAMINATION OF THE THORACIC SPINE WITH AND WITHOUT CONTRAST:  The alignment of the thoracic spine is within normal limits. There is a compression deformity involving the T7 vertebral body with loss of approximately 50% of vertical height anteriorly. Loss of vertical height appears similar to the PET examination of 07/18/2022, the PET examination of 04/11/2022, but is new versus the PET examination of 01/18/2022. There was mild edema and enhancement involving the superior endplate of T7 anteriorly.  A 5 mm area of enhancement is appreciated involving the superior aspect of the spinous process of T12 consistent with a metastatic lesion.  There is no evidence of disc herniation, canal stenosis or cord compression. No other areas of enhancement to suggest metastatic disease are identified.  IMPRESSION: 1.  There is a compression deformity involving T7 with mild edema and enhancement suggesting an acute to subacute fracture involving the superior endplate anteriorly. This is new as compared to the PET examination of 01/18/2022 but loss of vertical height appears similar to the PET examination of 04/11/2022. The appearance is nonspecific. This may represent a benign osteoporotic compression fracture. Underlying metastatic disease cannot be entirely excluded. 2.  There is a 5 mm metastatic lesion involving the superior aspect of the spinous process of T12. 3.  No evidence of intradural enhancement, cord compression or cord signal abnormality. There is no evidence of disc herniation.   MRI EXAMINATION OF THE LUMBAR SPINE WITH AND WITHOUT CONTRAST:  The alignment of the lumbar spine is within normal limits. There is increased signal intensity involving the marrow of the lumbar spine which suggests radiation related changes. Signal loss throughout the L1 vertebral body is noted on the T1  sequence consistent with metastatic disease. There is a mild concave deformity involving the inferior endplate of L1 and to a lesser extent superior endplate of L1. A fracture plane is noted paralleling the inferior endplate. Edema tracks into the pedicles bilaterally. Signal loss and enhancement is appreciated involving the superior articulating facet of L2 to the left consistent with metastatic disease. A 5 mm metastatic lesion involving the spinous process of T12 superiorly is again noted. A large metastatic lesion is appreciated involving the sacral ala on the right. This measures approximately 5 cm in transverse dimension.  The conus is at T12-L1 and the caudal aspect of the spinal cord appears unremarkable.  T12-L1: There is no evidence of disc bulge or herniation.  L1-L2: A mild broad-based disc osteophyte complex is present which is more prominent to the right.  L2-L3: There is no evidence of disc bulge or herniation.  L3-L4: A mild broad-based disc bulge is present with no evidence of herniation.  L4-L5: Mild facet degenerative disease is present bilaterally. A mild central disc osteophyte complex is present with no evidence of herniation.  L5-S1: Moderate-to-severe facet degenerative disease is present bilaterally. Moderate foraminal stenosis is present on the right and there is moderate-to-severe foraminal stenosis on the left secondary to loss of disc height and extension of a disc osteophyte complex into the neural foramen.  IMPRESSION: 1.  Multifocal metastatic disease involving the lumbar spine and sacrum is noted as described above with the largest lesion involving the sacral ala to the right measuring approximately 5 cm in transverse dimension. Metastatic involvement of L1 is appreciated throughout the vertebral body and extending to the pedicles bilaterally. There is no evidence of epidural extension. Smaller metastatic foci are appreciated involving the superior articulating facet of L1 to the  left and the superior aspect of the spinous process of T12. 2.  Loss of vertical height at L2 is appreciated estimated to be approximately 25% in severity. There is edema paralleling the superior endplate. The mild compression deformity at L2 is new versus the MRI examination of 10/01/2021.  The above information was called to and discussed with Dr. Jade on 08/04/2022 at 1240 hours.  This report was finalized on 8/5/2022 9:05 AM by Dr. Brenton Parham M.D.      US Renal Bilateral    Result Date: 8/5/2022  Negative.  This report was finalized on 8/5/2022 6:24 PM by Dr. Jelani Santacruz M.D.      NM PET/CT Skull Base to Mid Thigh    Result Date: 7/19/2022  1.  Multiple hypermetabolic osseous lesions with index lesions which have either increased in degree of FDG uptake or are newly hypermetabolic likely represent metastatic disease. Findings of a probable FDG avid lesion within the left femoral diaphysis; however findings are incompletely visualized and in an area of artifactual FDG uptake. Further evaluation with MRI of the left femur with and without contrast is recommended for confirmation as a metastatic lesion in this area would place this patient at risk for pathologic fracture. 2.  Interval increase in the degree of FDG uptake within the left adrenal gland likely represents metastatic disease. 3.   Focal left hilar hypermetabolism appears to correspond with a lymph node which is minimally increased in size since 04/11/2022. While findings may be reactive, they are most concerning for metastatic disease given the above findings. 4.  A few new bilateral sub-6 mm pulmonary nodules, indeterminate. Follow-up with chest CT in 3 months is recommended to exclude metastatic disease. 5.  Continued evolution of presumed postradiation changes within the perihilar aspect of the left mid and upper lung with interval decrease in extent of previously seen patchy groundglass opacification within the left lung. Small left pleural  effusion, as before. Continued attention on follow-up of these areas recommended to exclude residual neoplasm. 6.  Other findings as above  This report was finalized on 7/19/2022 9:52 AM by Dr. Serjio Lorenz M.D.      MRI Lumbar Spine With & Without Contrast    Result Date: 8/5/2022  The study is hampered somewhat by patient motion but there was no evidence of enhancement to suggest calvarial or intra-axial metastatic disease. Mild small vessel ischemic disease is noted. There is no evidence of acute infarction.   MRI EXAMINATION OF THE CERVICAL SPINE WITH AND WITHOUT CONTRAST:  The study is hampered by patient motion.  The alignment of the cervical spine is within normal limits. Signal intensity within the cord is normal on the sagittal T2 sequence.  The sagittal T2 sequence demonstrates increased signal intensity involving the lamina of C2 on the right, extending to and involving the spinous process as well as crossing midline to the left involving the posterior aspect of the C2 lamina on the left. The fat-saturated sequence demonstrates increased signal intensity involving the soft tissues adjacent to the lamina and spinous process of C2. The area of T2 hyperintensity within the soft tissues measures approximately 1.5 cm in cranial caudal dimension and 1.6 cm in AP dimension. After contrast administration.  There was enhancement of the lamina of C2 bilaterally, more prominent on the right as well as involving the spinous process. There is dural enhancement extending from the level of the inferior aspect of C2 to the inferior aspect of C1 posteriorly. The axial T1 noncontrast sequence demonstrates linear areas of signal loss involving the posterior laminar bilaterally. Pathologic fractures cannot be excluded.  C2-3: There is no evidence of disc bulge or herniation.  C3-4: There is no evidence of disc bulge or herniation.  C4-5: A minimal central disc bulge is present.  C5-6: Mild facet degenerative disease is  present bilaterally.  C6-7: There is no evidence of disc bulge or herniation.  C7-T1: There is no evidence of disc bulge or herniation.  IMPRESSION: 1.  Edema and enhancement involving the lamina of C2 to the right extending to and involving the spinous process and posterior aspect of the lamina of C2 is appreciated consistent with metastatic disease. There is also adjacent edema and enhancement within the soft tissues adjacent to the posterior elements of C2. There is mild dural enhancement posteriorly from the inferior aspect of the posterior arch of C1 to the inferior aspect of C2. The appearance is nonspecific. The degree of T2 hyperintensity and enhancement is more than typically seen with a metastatic lesion. The axial T1 precontrast sequence demonstrates linear areas of signal loss involving the lamina bilaterally. Fractures cannot be excluded. A CT examination of the cervical spine without contrast is suggested. 2.  Mild degenerative disease involving the cervical spine as described with no evidence of disc herniation. 3.  No evidence of cord signal abnormality or cord compression.   MRI EXAMINATION OF THE THORACIC SPINE WITH AND WITHOUT CONTRAST:  The alignment of the thoracic spine is within normal limits. There is a compression deformity involving the T7 vertebral body with loss of approximately 50% of vertical height anteriorly. Loss of vertical height appears similar to the PET examination of 07/18/2022, the PET examination of 04/11/2022, but is new versus the PET examination of 01/18/2022. There was mild edema and enhancement involving the superior endplate of T7 anteriorly.  A 5 mm area of enhancement is appreciated involving the superior aspect of the spinous process of T12 consistent with a metastatic lesion.  There is no evidence of disc herniation, canal stenosis or cord compression. No other areas of enhancement to suggest metastatic disease are identified.  IMPRESSION: 1.  There is a compression  deformity involving T7 with mild edema and enhancement suggesting an acute to subacute fracture involving the superior endplate anteriorly. This is new as compared to the PET examination of 01/18/2022 but loss of vertical height appears similar to the PET examination of 04/11/2022. The appearance is nonspecific. This may represent a benign osteoporotic compression fracture. Underlying metastatic disease cannot be entirely excluded. 2.  There is a 5 mm metastatic lesion involving the superior aspect of the spinous process of T12. 3.  No evidence of intradural enhancement, cord compression or cord signal abnormality. There is no evidence of disc herniation.   MRI EXAMINATION OF THE LUMBAR SPINE WITH AND WITHOUT CONTRAST:  The alignment of the lumbar spine is within normal limits. There is increased signal intensity involving the marrow of the lumbar spine which suggests radiation related changes. Signal loss throughout the L1 vertebral body is noted on the T1 sequence consistent with metastatic disease. There is a mild concave deformity involving the inferior endplate of L1 and to a lesser extent superior endplate of L1. A fracture plane is noted paralleling the inferior endplate. Edema tracks into the pedicles bilaterally. Signal loss and enhancement is appreciated involving the superior articulating facet of L2 to the left consistent with metastatic disease. A 5 mm metastatic lesion involving the spinous process of T12 superiorly is again noted. A large metastatic lesion is appreciated involving the sacral ala on the right. This measures approximately 5 cm in transverse dimension.  The conus is at T12-L1 and the caudal aspect of the spinal cord appears unremarkable.  T12-L1: There is no evidence of disc bulge or herniation.  L1-L2: A mild broad-based disc osteophyte complex is present which is more prominent to the right.  L2-L3: There is no evidence of disc bulge or herniation.  L3-L4: A mild broad-based disc bulge  is present with no evidence of herniation.  L4-L5: Mild facet degenerative disease is present bilaterally. A mild central disc osteophyte complex is present with no evidence of herniation.  L5-S1: Moderate-to-severe facet degenerative disease is present bilaterally. Moderate foraminal stenosis is present on the right and there is moderate-to-severe foraminal stenosis on the left secondary to loss of disc height and extension of a disc osteophyte complex into the neural foramen.  IMPRESSION: 1.  Multifocal metastatic disease involving the lumbar spine and sacrum is noted as described above with the largest lesion involving the sacral ala to the right measuring approximately 5 cm in transverse dimension. Metastatic involvement of L1 is appreciated throughout the vertebral body and extending to the pedicles bilaterally. There is no evidence of epidural extension. Smaller metastatic foci are appreciated involving the superior articulating facet of L1 to the left and the superior aspect of the spinous process of T12. 2.  Loss of vertical height at L2 is appreciated estimated to be approximately 25% in severity. There is edema paralleling the superior endplate. The mild compression deformity at L2 is new versus the MRI examination of 10/01/2021.  The above information was called to and discussed with Dr. Jade on 08/04/2022 at 1240 hours.  This report was finalized on 8/5/2022 9:05 AM by Dr. Brenton Parham M.D.        CT of the head, MRI of the cervical lumbar and thoracic spine-images #2 reviewed interpreted by me in detail summarized above.    Assessment/Plan      *Bilateral breast cancer  · Invasive ductal carcinoma in both right and left breast and lesions measure under 1 cm.  No palpable lymphadenopathy although unsure if this has been evaluated by an axillary ultrasound.  No abnormal axillary lymphadenopathy noted on PET.  · Most likely clinical T1b N0 M0, both cancers were noted to be grade 2, ER/RI positive and  HER-2 negative and Ki-67 less than 15%  VATS on 4/30/2021  Biopsy confirms adenocarcinoma of pulmonary primary  Discussed with Dr. Molina about delaying breast surgery and she is in agreement  Started on neoadjuvant anastrozole  · Patient experienced significant adverse effects with anastrozole  · Treatment changed to letrozole   · Axillary ultrasound 4/20/2021 -negative for any axillary lymphadenopathy  · Started letrozole in May 2021  · She continues on letrozole without any problems.  · 8/24/2021 bilateral diagnostic mammogram and ultrasound.  In the left breast the tumor now measures 1 cm on mammogram as opposed to 0.8 cm in March.  Right breast calcifications also noted to be in a large extent although the number seems to be decreased.  Hematoma has decreased in size.  · 10/26/2021-bilateral diagnostic mammogram and ultrasound-decrease in size of the bilateral breast lesions.  · 10/26/2021-PET/CT-metastatic lesions in the left adrenal gland, L1-L2.  · 10/26/2021-MRI of the brain-no evidence of metastatic disease  · 3/23/2022-bilateral diagnostic mammogram and ultrasound show complete resolution of the tumors.  · No evidence of disease progression.    *Adenocarcinoma of the left lung, metastatic   · Initially diagnosed as T1 N2 M0, stage III  · MRI of the thoracic spine ordered to further evaluate the abnormality seen on PET  · MRI of the brain negative   · Given that she is only 55 and fairly good performance status I discussed with her by with concurrent chemoradiation with cisplatin and Alimta every 3 weeks followed by durvalumab.  · Adverse effects including but not limited to myelosuppression, increased risk of infections, nausea, vomiting, renal dysfunction, ototoxicity, neurotoxicity have been discussed at length.  · MRI of the thoracic spine performed 5/24/2021 shows no evidence of metastatic disease in the thoracic spine however in L1 there is a 7 mm lesion which is indeterminate.  A lumbar spine MRI  has been recommended.  · Discussed her case with Dr. Oates and he feels like MRI of the lumbar spine may also show that this lesion is indeterminate.  · Discussed the same with the patient and her .  · Even if this is metastatic disease this might be the only lesion of metastatic disease and then would consider this oligometastatic disease.  · Therefore plan to proceed with concurrent chemoradiation as scheduled.  · cisplatin and Alimta, C1D1 5/25/2021  · Day 1 of radiation 5/25/2021  · 5/27/2021-MRI of the lumbar spine-there is a 6 x 5 x 6 mm enhancing lesion in the left posterior body of L1 vertebra.  This is considered indeterminate.  Follow-up recommended.  Degenerative changes noted at L5-S1  · 7/7/2021, cycle 3 cisplatin Alimta  · 7/12/2021-radiation completed  · PET/CT 8/6/2021 with good response to chemoradiation.  · MRI of the lumbar spine 10/1/2021 with increase in size of the L1 lesion which now measures 14 mm as opposed to 6 mm in May 2021.  · 10/14/2021-biopsy of the L1 lesion and pathology consistent with adenocarcinoma of the lung, TPS 0  · 0/26/2021-PET/CT-metastatic lesions in the left adrenal gland, L1-L2.  · 10/26/2021-MRI of the brain-no evidence of metastatic disease  · Given that she only has metastatic disease in L1-L2 and left adrenal gland I think it would be possible to radiate all these regions.  · Completed radiation to L1-L2 on 11/19/2021  · 11/22/2021-cycle 1 of Keytruda and Alimta  · Completed radiation to the left adrenal gland  · 1/18/2022-PET/CT-images independently reviewed and interpreted by me.  Decrease in size of the 7 mm pulmonary nodule in the left upper lobe which previously measured 9 mm.  Radiation pneumonitis  nearly resolved.  Hypermetabolic activity at the L1-L2 metastasis has resolved.  No new suspicious metastatic disease.  · Guardant 360 circulating DNA level decreasing.  · 3/7/2022-patient is complaining of fluid retention.  · Thought to be secondary to  Alimta and started on steroids and Lasix.  · Developed CHASITY on Lasix since Lasix discontinued.  · Alimta dose has been reduced.   · PET/CT 4/11/2022 concerning for disease progression in the left adrenal gland as well as L1 vertebra on the left.  · Case presented and discussed at the thoracic multidisciplinary conference.  The plan is to proceed with Keytruda and Alimta and radiation to the left adrenal gland and the L1 vertebra.  · She will be referred back to radiation oncology, she had to reschedule her appointment due to COVID-19 infection in the family.    · Evaluated by radiation oncology on 6/1/2022 and plan is to proceed with L1 and adrenal radiation.  MRI of the lumbar spine performed 6/6/2022  · Read pending  · 6/7/22: GFR still low; hold Alimta but proceed with Keytruda   · 6/28/22:This does show concerning area at L1 as well as concerning enlarging left adrenal gland nodule.  This is consistent with prior imaging.  This MRI was obtained specifically to help radiation oncology decide if additional radiation is possible.   · Completed 5 treatments to the lumbar spine, 30 Armstrong.  Radiation completed on 7/14/2022  · PET/CT 7/18/2022-multiple areas of disease progression noted in the spine and in the right sacrum.  · Alimta has been on hold since 5/10/2022 due to renal dysfunction.  · She continued on Keytruda without any interruption.  · Although she was off Alimta for 2 months even prior to holding Alimta there was concern for disease progression on the PET/CT.  · Due to this reason we will discontinue Alimta and Keytruda and start Taxotere 75 mg per metered squared every 3 weeks.  · Guardant 360,  7/20/2022 without any actionable mutations  · 7/26/2022-cycle 1 Taxotere  · Altered mental status requiring hospitalization as well as febrile neutropenia between 8/2/2022 to 8/9/2022   · 8/16/2022-scheduled for cycle 2 of Taxotere.  · Radiation to the cervical spine as well as the sacrum on the right side  completed on 8/11/2022  · Udenyca will be added for febrile neutropenia    *Neutropenia  · 8/2/2022 WBC count 0.51 with an absolute neutrophil count of 0.08  · Secondary to Taxotere  · Admitted to the hospital on 8/2/2022 for febrile neutropenia as well as altered mental status  · G-CSF support with subsequent cycles of Taxotere    *Back pain  · Secondary to the right sacral lesion  · Palliative radiation to the right sacral lesion performed and completed on 8/11/2022  · Excessive sedation with long-acting morphine  · Continue with Percocet 10 mg every 4 hours as needed for pain    *Neuropathy in lower extremities  · Unchanged      *Fluid retention  · Resolved now that she is off Alimta    *CHASITY  · Most likely secondary to Lasix  · Discontinue Lasix  · 1 L normal saline 3/28/2022  · Kidney function improved, Creatinine 1.69 and BUN 23.  Encourage patient to increase oral intake.  Discouraged intake of caffeinated beverages.  Patient to return in 1 week for labs only around the time of her schedule PET scan appointment.  · 4/19/2022-creatinine 1.36.  · 5/10/2022-creatinine elevated at 1.96.  · 5/31/2022 creatinine elevated at 2.01  · 6/7/2022-creatinine 1.76. Alimta held, continue Keytruda  · 6/28/2022 creatinine 1.5, GFR 40.  Patient will see nephrology for new patient consultation 7/7/2022.  Continue to hold Alimta for now.  · 8/2/2022-creatinine stable at 1.3  · 8/16/2022-creatinine 1.0, at baseline    *Anemia  · Hemoglobin stable at 11.3  · Asymptomatic    *Bilateral breast cancer-no family history of breast cancer but given synchronous breast cancer genetic testing has been sent.  Genetic testing with a variant of unknown significance.    *Right hand numbness and difficulty with grasping objects  · Previously had history of carpal tunnel requiring repair.  · She is currently on letrozole which could cause worsening of the carpal tunnel  · Gabapentin dose will be increased to 600 mg 3 times daily  · She will also  be referred to hand surgery for further evaluation.  · She now reports bilateral upper extremity numbness.  · Continue gabapentin  · MRI of the brain 10/26/2021 without any evidence of metastatic disease  · Not an issue today    *Anxiety-  · Xanax dose decreased to 0.5 mg  · Zyprexa has been discontinued  · She continues on Wellbutrin.  · She reports that the anxiety is poorly controlled  · Refer to supportive oncology    *Depression  · Zyprexa discontinued  · Continue Xanax and Wellbutrin  · Referral to supportive oncology made today    *Left-sided chest wall pain-secondary to VATS.  Most likely neuropathic.  Continue gabapentin and Norco as needed.  She ran out of gabapentin and experienced leg cramps.    Continue gabapentin  Improved    *Hypertension-  · Lisinopril discontinued due to CHASITY per above.    · Blood pressure normal at 127/88    *Smoking-not addressed today    *GERD  · 7/7/2021, patient complains of reflux despite the use of Prilosec 20 mg twice daily.  We will send her a prescription for Carafate slurry 4 times daily.  · She has not quite started using the Carafate.  · Continue Prilosec and Carafate  · PET/CT 4/11/2022 shows increased uptake in the proximal esophagus.  Likely secondary to GERD.  · Continue current medication    *Port not returning blood    · A new Mediport was placed by thoracic surgery 11/29/2021, Eliquis discontinued and right chest Mediport is now safe to use.      PLAN:  1. Continue Taxotere   2. G-CSF with each cycle  3. Follow-up in 1 week for administering normal saline and toxicity check    48 minutes has been spent on the encounter including the review of records from the hospital, reviewing all the images performed in the hospital, face-to-face time, documentation on the same day         Salma Snell MD

## 2022-08-17 ENCOUNTER — INFUSION (OUTPATIENT)
Dept: ONCOLOGY | Facility: HOSPITAL | Age: 57
End: 2022-08-17

## 2022-08-17 DIAGNOSIS — C34.90 NON-SMALL CELL LUNG CANCER METASTATIC TO BONE: Primary | ICD-10-CM

## 2022-08-17 DIAGNOSIS — C79.51 NON-SMALL CELL LUNG CANCER METASTATIC TO BONE: Primary | ICD-10-CM

## 2022-08-17 PROCEDURE — 25010000002 PEGFILGRASTIM-CBQV 6 MG/0.6ML SOLUTION PREFILLED SYRINGE: Performed by: INTERNAL MEDICINE

## 2022-08-17 PROCEDURE — 96372 THER/PROPH/DIAG INJ SC/IM: CPT

## 2022-08-17 RX ADMIN — PEGFILGRASTIM-CBQV 6 MG: 6 INJECTION, SOLUTION SUBCUTANEOUS at 12:16

## 2022-08-17 NOTE — PROGRESS NOTES
Patient Name: Holli Patel    Date: 2022  : 1965       MRN: [id number]     Referring Physician: No ref. provider found  DX: No diagnosis found.     COMPLETION NOTE      Primary Radiation Oncologist: Yoandy Katz MD    PRIMARY SITE AND HISTOPATHOLOGY:   Metastatic NSCLC      STAGE: Metastatic       SIGNIFICANT LAB AND X-RAY FINDINGS: Painful sacral metastasis, c-spine metastatic disease      PLAN OF TREATMENT: Palliative      DATE STARTED: 2022   DATE COMPLETED:  2022      TOTAL DOSE: 30Gy   DOSE/FRACTION: 3Gy  ENERGY:  6x (C spine), 18x (Sacrum)   STATUS OF TUMOR/PATIENT AT COMPLETION OF RADIOTHERAPY: Improved       TOLERANCE: Well tolerated      DISPOSITION: Follow up PRN      Current Outpatient Medications:   •  ALPRAZolam (XANAX) 0.5 MG tablet, Take 1 tablet by mouth At Night As Needed for Anxiety or Sleep., Disp: 60 tablet, Rfl: 2  •  buPROPion XL (WELLBUTRIN XL) 150 MG 24 hr tablet, Take 1 tablet by mouth Daily., Disp: 30 tablet, Rfl: 0  •  calcitriol (ROCALTROL) 0.5 MCG capsule, Take 2 capsules by mouth Daily., Disp: 60 capsule, Rfl: 0  •  FLUoxetine (PROzac) 40 MG capsule, TAKE ONE CAPSULE BY MOUTH DAILY, Disp: 90 capsule, Rfl: 1  •  folic acid (FOLVITE) 1 MG tablet, Take 1 tablet by mouth Daily., Disp: 30 tablet, Rfl: 3  •  gabapentin (NEURONTIN) 300 MG capsule, Take 1 capsule by mouth 3 (Three) Times a Day. TAKE ONE CAPSULES BY MOUTH THREE TIMES A DAY, Disp: 180 capsule, Rfl: 3  •  letrozole (FEMARA) 2.5 MG tablet, Take 1 tablet by mouth Daily., Disp: 90 tablet, Rfl: 3  •  levothyroxine (SYNTHROID, LEVOTHROID) 25 MCG tablet, Take 1 tablet by mouth Daily., Disp: 90 tablet, Rfl: 1  •  omeprazole (PrilOSEC) 20 MG capsule, Take 1 capsule by mouth 2 (Two) Times a Day., Disp: 180 capsule, Rfl: 1  •  oxyCODONE-acetaminophen (PERCOCET)  MG per tablet, Take 1 tablet by mouth Every 4 (Four) Hours As Needed for Moderate Pain ., Disp: 120 tablet, Rfl: 0  •  prochlorperazine (COMPAZINE)  10 MG tablet, Take 1 tablet by mouth Every 6 (Six) Hours As Needed for Nausea or Vomiting., Disp: 30 tablet, Rfl: 1  •  propranolol LA (Inderal LA) 60 MG 24 hr capsule, Take 1 capsule by mouth Daily., Disp: 90 capsule, Rfl: 1  •  rizatriptan (MAXALT) 10 MG tablet, Take 1 tablet by mouth 1 (One) Time for 1 dose. AT ONSET OF HEADACHE MAY REPEAT ONE TABLET IN 2 HOURS IF NEEDED, Disp: 12 tablet, Rfl: 5  •  simvastatin (ZOCOR) 20 MG tablet, Take 1 tablet by mouth Daily., Disp: 90 tablet, Rfl: 1  No current facility-administered medications for this visit.    KPS: 80:  Normal activity with effort; some signs or symptoms            cc:         Approved Electronically By:  Yoandy Katz MD, 8/17/2022, 11:53 EDT                              Confidentiality of this medical record shall be maintained except when use or disclosure is required or permitted by law, regulation or written authorization by the patient.

## 2022-08-17 NOTE — NURSING NOTE
Pt reports this is her first Udenyca injection. Educated pt on what this medication is used for and that bone pain is the most common side effect. Pt report she has already started taking Claritin to help with this as instructed. Pt instructed to call w/ concerns before returning next week, she v/u.

## 2022-08-19 ENCOUNTER — OFFICE VISIT (OUTPATIENT)
Dept: PSYCHIATRY | Facility: HOSPITAL | Age: 57
End: 2022-08-19

## 2022-08-19 DIAGNOSIS — F41.1 GENERALIZED ANXIETY DISORDER: Primary | ICD-10-CM

## 2022-08-19 PROCEDURE — 90792 PSYCH DIAG EVAL W/MED SRVCS: CPT | Performed by: NURSE PRACTITIONER

## 2022-08-19 RX ORDER — ESCITALOPRAM OXALATE 10 MG/1
10 TABLET ORAL DAILY
Qty: 30 TABLET | Refills: 2 | Status: SHIPPED | OUTPATIENT
Start: 2022-08-19 | End: 2022-12-02

## 2022-08-19 NOTE — PROGRESS NOTES
In Person  Provider Location: [facility]    Chief Complaint: anxiety, depression    Subjective  Patient ID: Holli Patel is a 57 y.o. female who presents for initial consultation through the [facility] at the request of Salma Snell MD     PHQ9  13  SHAYY 7 Total Score: 6    HPI:  Pt is referred to [facility] clinic for worsening experience of depression and anxiety alongside treatment for metastatic non-small cell lung cancer.  Patient reports historical depression and anxiety, dating back to her mid 30s.  Has been on various antidepressant therapy, currently taking Prozac 40 mg daily and Wellbutrin  mg daily per Dr. Gastelum.  Additional medications reviewed to include gabapentin 300 mg 3 times daily.  No longer taking Zyprexa at night, recently discontinued with hospitalization.  Patient is noted to be fidgety, shaking leg, although denies this being distressful to her and sees it as a positive coping strategy.  Patient feels current mood could be better, endorsing significant demoralization, and all-encompassing nature of cancer experience.  Patient  is also a cancer survivor, diagnosed with lymphoma in 2015, and patient is also caring for mother who has dementia.    PHQ 9 and SHAYY 7 reviewed; patient  filled these out, so reviewed individually with patient.  She does endorse reduced interest and pleasure, not engaging in much outside of watching TV.  Historically reports enjoyment of work as a  and , and misses this greatly.  Does identify feeling down, sleeping or resting too much throughout the day because of fatigue, and some reduction in appetite.  Has some difficulty concentrating, and finds eyesight is a piece of this.  Does find anxiety is present and intrusive at times. Pt denies past or current thoughts of suicide or self harm.  Patient identifies reduced socialization due to estrangement from  work.    Social History  Marital Status:  to  of 37 years  Children: 2 children; one son and one daughter  Support Community: Spouse, son  Career: [employer]/   Tobacco Use: pt reports quitting smoking appx 1 year ago.  Alcohol Use: does not drink  Marijuana/ Other drug Use: denied.    Medical History  Psychiatric History: Hx depression, anxiety; Pt is currently on prozac and wellbutrin, believing wellbutrin was added for smoking cessation and never discontinued.     Family History  Family Psychiatric History: There has been no family history of psychological problems  Family History: Mother with demential    The following portions of the patient's history were reviewed and updated as appropriate: She  has a past medical history of Anxiety, Clot, Dyspnea on exertion, Elevated cholesterol, Frequent urination, SHAYY (generalized anxiety disorder), GERD (gastroesophageal reflux disease), High blood pressure, Hyperlipidemia, Hypothyroidism, Lung cancer (HCC), Lung nodule, Malignant neoplasm of upper-outer quadrant of right breast in female, estrogen receptor positive (HCC) (4/13/2021), Migraine, and PONV (postoperative nausea and vomiting).  She  has a past surgical history that includes Wrist surgery (Right); Breast biopsy (Bilateral, 04/07/2021); Closed reduction hip dislocation (Left, 4/30/2021); Colonoscopy; Venous Access Device (Port) (Right, 5/20/2021); Tubal ligation; and Venous Access Device (Port) (Right, 11/29/2021).  Her family history includes Alcohol abuse in her brother; Cancer in her father; Colon cancer in her maternal grandmother; Lymphoma in her father; Prostate cancer in her father.  She  reports that she quit smoking about 19 months ago. Her smoking use included electronic cigarette and cigarettes. She started smoking about 41 years ago. She has a 30.00 pack-year smoking history. She has never used smokeless tobacco. She reports that she does not drink alcohol and does not use  drugs.  Current Outpatient Medications   Medication Sig Dispense Refill   • ALPRAZolam (XANAX) 0.5 MG tablet Take 1 tablet by mouth At Night As Needed for Anxiety or Sleep. 60 tablet 2   • buPROPion XL (WELLBUTRIN XL) 150 MG 24 hr tablet Take 1 tablet by mouth Daily. 30 tablet 0   • calcitriol (ROCALTROL) 0.5 MCG capsule Take 2 capsules by mouth Daily. 60 capsule 0   • escitalopram (Lexapro) 10 MG tablet Take 1 tablet by mouth Daily. 30 tablet 2   • folic acid (FOLVITE) 1 MG tablet Take 1 tablet by mouth Daily. 30 tablet 3   • gabapentin (NEURONTIN) 300 MG capsule Take 1 capsule by mouth 3 (Three) Times a Day. TAKE ONE CAPSULES BY MOUTH THREE TIMES A  capsule 3   • letrozole (FEMARA) 2.5 MG tablet Take 1 tablet by mouth Daily. 90 tablet 3   • levothyroxine (SYNTHROID, LEVOTHROID) 25 MCG tablet Take 1 tablet by mouth Daily. 90 tablet 1   • omeprazole (PrilOSEC) 20 MG capsule Take 1 capsule by mouth 2 (Two) Times a Day. 180 capsule 1   • oxyCODONE-acetaminophen (PERCOCET)  MG per tablet Take 1 tablet by mouth Every 4 (Four) Hours As Needed for Moderate Pain . 120 tablet 0   • prochlorperazine (COMPAZINE) 10 MG tablet Take 1 tablet by mouth Every 6 (Six) Hours As Needed for Nausea or Vomiting. 30 tablet 1   • propranolol LA (Inderal LA) 60 MG 24 hr capsule Take 1 capsule by mouth Daily. 90 capsule 1   • rizatriptan (MAXALT) 10 MG tablet Take 1 tablet by mouth 1 (One) Time for 1 dose. AT ONSET OF HEADACHE MAY REPEAT ONE TABLET IN 2 HOURS IF NEEDED 12 tablet 5   • simvastatin (ZOCOR) 20 MG tablet Take 1 tablet by mouth Daily. 90 tablet 1     No current facility-administered medications for this visit.     Current Outpatient Medications on File Prior to Visit   Medication Sig   • ALPRAZolam (XANAX) 0.5 MG tablet Take 1 tablet by mouth At Night As Needed for Anxiety or Sleep.   • buPROPion XL (WELLBUTRIN XL) 150 MG 24 hr tablet Take 1 tablet by mouth Daily.   • calcitriol (ROCALTROL) 0.5 MCG capsule Take 2  capsules by mouth Daily.   • folic acid (FOLVITE) 1 MG tablet Take 1 tablet by mouth Daily.   • gabapentin (NEURONTIN) 300 MG capsule Take 1 capsule by mouth 3 (Three) Times a Day. TAKE ONE CAPSULES BY MOUTH THREE TIMES A DAY   • letrozole (FEMARA) 2.5 MG tablet Take 1 tablet by mouth Daily.   • levothyroxine (SYNTHROID, LEVOTHROID) 25 MCG tablet Take 1 tablet by mouth Daily.   • omeprazole (PrilOSEC) 20 MG capsule Take 1 capsule by mouth 2 (Two) Times a Day.   • oxyCODONE-acetaminophen (PERCOCET)  MG per tablet Take 1 tablet by mouth Every 4 (Four) Hours As Needed for Moderate Pain .   • prochlorperazine (COMPAZINE) 10 MG tablet Take 1 tablet by mouth Every 6 (Six) Hours As Needed for Nausea or Vomiting.   • propranolol LA (Inderal LA) 60 MG 24 hr capsule Take 1 capsule by mouth Daily.   • rizatriptan (MAXALT) 10 MG tablet Take 1 tablet by mouth 1 (One) Time for 1 dose. AT ONSET OF HEADACHE MAY REPEAT ONE TABLET IN 2 HOURS IF NEEDED   • simvastatin (ZOCOR) 20 MG tablet Take 1 tablet by mouth Daily.   • [DISCONTINUED] FLUoxetine (PROzac) 40 MG capsule TAKE ONE CAPSULE BY MOUTH DAILY     No current facility-administered medications on file prior to visit.     She has No Known Allergies..    Review of Systems   Constitutional: Positive for activity change, appetite change and fatigue. Negative for unexpected weight change.   Psychiatric/Behavioral: Positive for decreased concentration, dysphoric mood and sleep disturbance. Negative for self-injury and suicidal ideas. The patient is nervous/anxious.      Objective   Mental Status Exam  Appearance:  clean and casually dressed, appropriate  Attitude toward clinician:  cooperative and agreeable   Speech:    Rate:  regular rate and rhythm   Volume:  normal  Motor:  no abnormal movements present  Mood:  demoralized  Affect:  euthymic, anxious and mood congruent  Thought Processes:  linear, logical, and goal directed  Thought Content:  normal  Suicidal Thoughts:   absent  Homicidal Thoughts:  absent  Perceptual Disturbance: no perceptual disturbance  Attention and Concentration:  good  Insight and Judgement:  good  Memory:  memory appears to be intact    Physical Exam  Constitutional:       Appearance: Normal appearance.   Neurological:      Mental Status: She is alert and oriented to person, place, and time.   Psychiatric:         Attention and Perception: Attention normal.         Mood and Affect: Mood is anxious.         Speech: Speech normal.         Behavior: Behavior normal. Behavior is cooperative.         Thought Content: Thought content normal.         Cognition and Memory: Cognition normal.       Station and gait observed to be stable, although weak as compared to baseline.    Lab Review:   Infusion on 08/16/2022   Component Date Value   • Glucose 08/16/2022 117    • BUN 08/16/2022 12    • Creatinine 08/16/2022 1.02    • Sodium 08/16/2022 139    • Potassium 08/16/2022 4.0    • Chloride 08/16/2022 106    • CO2 08/16/2022 21.3 (A)   • Calcium 08/16/2022 8.6    • Total Protein 08/16/2022 7.6    • Albumin 08/16/2022 3.70    • ALT (SGPT) 08/16/2022 11    • AST (SGOT) 08/16/2022 12    • Alkaline Phosphatase 08/16/2022 130 (A)   • Total Bilirubin 08/16/2022 0.2    • Globulin 08/16/2022 3.9 (A)   • A/G Ratio 08/16/2022 0.9 (A)   • BUN/Creatinine Ratio 08/16/2022 11.8    • Anion Gap 08/16/2022 11.7    • eGFR 08/16/2022 63.5    • WBC 08/16/2022 5.88    • RBC 08/16/2022 3.44 (A)   • Hemoglobin 08/16/2022 10.0 (A)   • Hematocrit 08/16/2022 33.3 (A)   • MCV 08/16/2022 96.8    • MCH 08/16/2022 29.1    • MCHC 08/16/2022 30.0 (A)   • RDW 08/16/2022 13.2    • RDW-SD 08/16/2022 46.4    • MPV 08/16/2022 9.2    • Platelets 08/16/2022 268    • Neutrophil % 08/16/2022 74.6    • Lymphocyte % 08/16/2022 13.3 (A)   • Monocyte % 08/16/2022 9.7    • Eosinophil % 08/16/2022 0.3    • Basophil % 08/16/2022 0.7    • Immature Grans % 08/16/2022 1.4 (A)   • Neutrophils, Absolute 08/16/2022 4.39     • Lymphocytes, Absolute 08/16/2022 0.78    • Monocytes, Absolute 08/16/2022 0.57    • Eosinophils, Absolute 08/16/2022 0.02    • Basophils, Absolute 08/16/2022 0.04    • Immature Grans, Absolute 08/16/2022 0.08 (A)   • nRBC 08/16/2022 0.0    Treatment on 08/12/2022   Component Date Value   • Course ID 08/12/2022 C6 C-Spine/Sacru    • Course Intent 08/12/2022 Palliative    • Course Start Date 08/12/2022 8/8/2022  1:05 PM    • Course First Treatment D* 08/12/2022 8/8/2022  4:23 PM    • Course Last Treatment Da* 08/12/2022 8/12/2022  9:34 AM    • Course Elapsed Days 08/12/2022 4    • Reference Point ID 08/12/2022 calc C-Spine    • Reference Point Dosage G* 08/12/2022 20.0517463    • Reference Point Session * 08/12/2022 4.38881365    • Reference Point ID 08/12/2022 calc Sacrum    • Reference Point Dosage G* 08/12/2022 20.4245313    • Reference Point Session * 08/12/2022 4.88553005    • Reference Point ID 08/12/2022 Rx: C-Spine    • Reference Point Dosage G* 08/12/2022 20    • Reference Point Session * 08/12/2022 4    • Reference Point ID 08/12/2022 Rx: Sacrum    • Reference Point Dosage G* 08/12/2022 20    • Reference Point Session * 08/12/2022 4    • Plan ID 08/12/2022 [id number]    • Plan Fractions Treated t* 08/12/2022 5    • Plan Total Fractions Pre* 08/12/2022 5    • Plan Prescribed Dose Per* 08/12/2022 4    • Plan Total Prescribed Do* 08/12/2022 2,000    • Plan Primary Reference P* 08/12/2022 Rx: C-Spine    • Plan ID 08/12/2022 Sacrum    • Plan Fractions Treated t* 08/12/2022 5    • Plan Total Fractions Pre* 08/12/2022 5    • Plan Prescribed Dose Per* 08/12/2022 4    • Plan Total Prescribed Do* 08/12/2022 2,000    • Plan Primary Reference P* 08/12/2022 Rx: Sacrum    • Course ID 08/12/2022 C6 C-Spine/Sacru    • Course Intent 08/12/2022 Palliative    • Course Start Date 08/12/2022 8/8/2022  1:05 PM    • Course End Date 08/12/2022 8/12/2022 10:28 AM    • Course First Treatment D* 08/12/2022 8/8/2022  4:23 PM    •  Course Last Treatment Da* 08/12/2022 8/12/2022  9:34 AM    • Course Elapsed Days 08/12/2022 4    • Reference Point ID 08/12/2022 calc C-Spine    • Reference Point Dosage G* 08/12/2022 20.2341993    • Reference Point ID 08/12/2022 calc Sacrum    • Reference Point Dosage G* 08/12/2022 20.2571696    • Reference Point ID 08/12/2022 Rx: C-Spine    • Reference Point Dosage G* 08/12/2022 20    • Reference Point ID 08/12/2022 Rx: Sacrum    • Reference Point Dosage G* 08/12/2022 20    • Plan ID 08/12/2022 [id number]    • Plan Name 08/12/2022 [id number]    • Plan Fractions Treated t* 08/12/2022 5    • Plan Total Fractions Pre* 08/12/2022 5    • Plan Prescribed Dose Per* 08/12/2022 4    • Plan Total Prescribed Do* 08/12/2022 2,000    • Plan Primary Reference P* 08/12/2022 Rx: C-Spine    • Plan ID 08/12/2022 Sacrum    • Plan Name 08/12/2022 Sacrum    • Plan Fractions Treated t* 08/12/2022 5    • Plan Total Fractions Pre* 08/12/2022 5    • Plan Prescribed Dose Per* 08/12/2022 4    • Plan Total Prescribed Do* 08/12/2022 2,000    • Plan Primary Reference P* 08/12/2022 Rx: Sacrum    Treatment on 08/11/2022   Component Date Value   • Course ID 08/11/2022 C6 C-Spine/Sacru    • Course Intent 08/11/2022 Palliative    • Course Start Date 08/11/2022 8/8/2022  1:05 PM    • Course First Treatment D* 08/11/2022 8/8/2022  4:23 PM    • Course Last Treatment Da* 08/11/2022 8/11/2022  3:46 PM    • Course Elapsed Days 08/11/2022 3    • Reference Point ID 08/11/2022 calc C-Spine    • Reference Point Dosage G* 08/11/2022 16.21056011    • Reference Point Session * 08/11/2022 4.84741848    • Reference Point ID 08/11/2022 calc Sacrum    • Reference Point Dosage G* 08/11/2022 16.76490278    • Reference Point Session * 08/11/2022 4.61091860    • Reference Point ID 08/11/2022 Rx: C-Spine    • Reference Point Dosage G* 08/11/2022 16    • Reference Point Session * 08/11/2022 4    • Reference Point ID 08/11/2022 Rx: Sacrum    • Reference Point Dosage G*  08/11/2022 16    • Reference Point Session * 08/11/2022 4    • Plan ID 08/11/2022 [id number]    • Plan Fractions Treated t* 08/11/2022 4    • Plan Total Fractions Pre* 08/11/2022 5    • Plan Prescribed Dose Per* 08/11/2022 4    • Plan Total Prescribed Do* 08/11/2022 2,000    • Plan Primary Reference P* 08/11/2022 Rx: C-Spine    • Plan ID 08/11/2022 Sacrum    • Plan Fractions Treated t* 08/11/2022 4    • Plan Total Fractions Pre* 08/11/2022 5    • Plan Prescribed Dose Per* 08/11/2022 4    • Plan Total Prescribed Do* 08/11/2022 2,000    • Plan Primary Reference P* 08/11/2022 Rx: Sacrum    Orders Only on 08/10/2022   Component Date Value   • Course ID 08/10/2022 C6 C-Spine/Sacru    • Course Intent 08/10/2022 Palliative    • Course Start Date 08/10/2022 8/8/2022  1:05 PM    • Course First Treatment D* 08/10/2022 8/8/2022  4:23 PM    • Course Last Treatment Da* 08/10/2022 8/10/2022  3:50 PM    • Course Elapsed Days 08/10/2022 2    • Reference Point ID 08/10/2022 calc C-Spine    • Reference Point Dosage G* 08/10/2022 12.57886846    • Reference Point Session * 08/10/2022 4.52531661    • Reference Point ID 08/10/2022 calc Sacrum    • Reference Point Dosage G* 08/10/2022 12.18643519    • Reference Point Session * 08/10/2022 4.23074106    • Reference Point ID 08/10/2022 Rx: C-Spine    • Reference Point Dosage G* 08/10/2022 12    • Reference Point Session * 08/10/2022 4    • Reference Point ID 08/10/2022 Rx: Sacrum    • Reference Point Dosage G* 08/10/2022 12    • Reference Point Session * 08/10/2022 4    • Plan ID 08/10/2022 [id number]    • Plan Fractions Treated t* 08/10/2022 3    • Plan Total Fractions Pre* 08/10/2022 5    • Plan Prescribed Dose Per* 08/10/2022 4    • Plan Total Prescribed Do* 08/10/2022 2,000    • Plan Primary Reference P* 08/10/2022 Rx: C-Spine    • Plan ID 08/10/2022 Sacrum    • Plan Fractions Treated t* 08/10/2022 3    • Plan Total Fractions Pre* 08/10/2022 5    • Plan Prescribed Dose Per* 08/10/2022 4    •  Plan Total Prescribed Do* 08/10/2022 2,000    • Plan Primary Reference P* 08/10/2022 Rx: Sacrum    No results displayed because visit has over 200 results.      Treatment on 08/09/2022   Component Date Value   • Course ID 08/09/2022 C6 C-Spine/Sacru    • Course Intent 08/09/2022 Palliative    • Course Start Date 08/09/2022 8/8/2022  1:05 PM    • Course First Treatment D* 08/09/2022 8/8/2022  4:23 PM    • Course Last Treatment Da* 08/09/2022 8/9/2022 11:05 AM    • Course Elapsed Days 08/09/2022 1    • Reference Point ID 08/09/2022 calc C-Spine    • Reference Point Dosage G* 08/09/2022 8.97638387    • Reference Point Session * 08/09/2022 4.16355707    • Reference Point ID 08/09/2022 calc Sacrum    • Reference Point Dosage G* 08/09/2022 8.15993438    • Reference Point Session * 08/09/2022 4.37431470    • Reference Point ID 08/09/2022 Rx: C-Spine    • Reference Point Dosage G* 08/09/2022 8    • Reference Point Session * 08/09/2022 4    • Reference Point ID 08/09/2022 Rx: Sacrum    • Reference Point Dosage G* 08/09/2022 8    • Reference Point Session * 08/09/2022 4    • Plan ID 08/09/2022 [id number]    • Plan Fractions Treated t* 08/09/2022 2    • Plan Total Fractions Pre* 08/09/2022 5    • Plan Prescribed Dose Per* 08/09/2022 4    • Plan Total Prescribed Do* 08/09/2022 2,000    • Plan Primary Reference P* 08/09/2022 Rx: C-Spine    • Plan ID 08/09/2022 Sacrum    • Plan Fractions Treated t* 08/09/2022 2    • Plan Total Fractions Pre* 08/09/2022 5    • Plan Prescribed Dose Per* 08/09/2022 4    • Plan Total Prescribed Do* 08/09/2022 2,000    • Plan Primary Reference P* 08/09/2022 Rx: Sacrum    Treatment on 08/08/2022   Component Date Value   • Course ID 08/08/2022 C6 C-Spine/Sacru    • Course Intent 08/08/2022 Palliative    • Course Start Date 08/08/2022 8/8/2022  1:05 PM    • Course First Treatment D* 08/08/2022 8/8/2022  4:23 PM    • Course Last Treatment Da* 08/08/2022 8/8/2022  4:35 PM    • Course Elapsed Days 08/08/2022  0    • Reference Point ID 08/08/2022 calc C-Spine    • Reference Point Dosage G* 08/08/2022 4.40165375    • Reference Point Session * 08/08/2022 4.27465644    • Reference Point ID 08/08/2022 calc Sacrum    • Reference Point Dosage G* 08/08/2022 4.69016077    • Reference Point Session * 08/08/2022 4.28561814    • Reference Point ID 08/08/2022 Rx: C-Spine    • Reference Point Dosage G* 08/08/2022 4    • Reference Point Session * 08/08/2022 4    • Reference Point ID 08/08/2022 Rx: Sacrum    • Reference Point Dosage G* 08/08/2022 4    • Reference Point Session * 08/08/2022 4    • Plan ID 08/08/2022 [id number]    • Plan Fractions Treated t* 08/08/2022 1    • Plan Total Fractions Pre* 08/08/2022 5    • Plan Prescribed Dose Per* 08/08/2022 4    • Plan Total Prescribed Do* 08/08/2022 2,000    • Plan Primary Reference P* 08/08/2022 Rx: C-Spine    • Plan ID 08/08/2022 Sacrum    • Plan Fractions Treated t* 08/08/2022 1    • Plan Total Fractions Pre* 08/08/2022 5    • Plan Prescribed Dose Per* 08/08/2022 4    • Plan Total Prescribed Do* 08/08/2022 2,000    • Plan Primary Reference P* 08/08/2022 Rx: Sacrum    Lab on 08/02/2022   Component Date Value   • WBC 08/02/2022 0.51 (A)   • RBC 08/02/2022 3.46 (A)   • Hemoglobin 08/02/2022 10.4 (A)   • Hematocrit 08/02/2022 34.6    • MCV 08/02/2022 100.0 (A)   • MCH 08/02/2022 30.1    • MCHC 08/02/2022 30.1 (A)   • RDW 08/02/2022 12.7    • RDW-SD 08/02/2022 46.5    • MPV 08/02/2022 9.8    • Platelets 08/02/2022 193    • Neutrophil % 08/02/2022 15.7 (A)   • Lymphocyte % 08/02/2022 51.0 (A)   • Monocyte % 08/02/2022 23.5 (A)   • Eosinophil % 08/02/2022 3.9    • Basophil % 08/02/2022 3.9 (A)   • Immature Grans % 08/02/2022 2.0 (A)   • Neutrophils, Absolute 08/02/2022 0.08 (A)   • Lymphocytes, Absolute 08/02/2022 0.26 (A)   • Monocytes, Absolute 08/02/2022 0.12    • Eosinophils, Absolute 08/02/2022 0.02    • Basophils, Absolute 08/02/2022 0.02    • Immature Grans, Absolute 08/02/2022 0.01    •  nRBC 08/02/2022 0.0    • Glucose 08/02/2022 123    • BUN 08/02/2022 15    • Creatinine 08/02/2022 1.33 (A)   • Sodium 08/02/2022 137    • Potassium 08/02/2022 4.5    • Chloride 08/02/2022 107    • CO2 08/02/2022 18.2 (A)   • Calcium 08/02/2022 7.8 (A)   • Total Protein 08/02/2022 7.4    • Albumin 08/02/2022 3.80    • ALT (SGPT) 08/02/2022 20    • AST (SGOT) 08/02/2022 21    • Alkaline Phosphatase 08/02/2022 128 (A)   • Total Bilirubin 08/02/2022 0.2    • Globulin 08/02/2022 3.6 (A)   • A/G Ratio 08/02/2022 1.1    • BUN/Creatinine Ratio 08/02/2022 11.3    • Anion Gap 08/02/2022 11.8    • eGFR 08/02/2022 46.2 (A)   • IgG 08/02/2022 1010    • IgA 08/02/2022 168    • IgM 08/02/2022 30    • Total Protein 08/02/2022 6.7    • Albumin 08/02/2022 2.9    • Alpha-1-Globulin 08/02/2022 0.4    • Alpha-2-Globulin 08/02/2022 1.4 (A)   • Beta Globulin 08/02/2022 1.1    • Gamma Globulin 08/02/2022 1.0    • M-Narinder 08/02/2022 Comment:    • Globulin 08/02/2022 3.8    • A/G Ratio 08/02/2022 0.8    • Immunofixation Reflex, S* 08/02/2022 Comment:    • Please note 08/02/2022 Comment    • Free Light Chain, [place] 08/02/2022 25.1 (A)   • Free Lambda Light Chains 08/02/2022 14.2    • Kappa/Lambda Ratio 08/02/2022 1.77 (A)   • Uric Acid 08/02/2022 3.3    • Magnesium 08/02/2022 1.8    • Glucose 08/02/2022 123    • BUN 08/02/2022 15    • Creatinine 08/02/2022 1.32 (A)   • Sodium 08/02/2022 137    • Potassium 08/02/2022 4.5    • Chloride 08/02/2022 107    • CO2 08/02/2022 18.7 (A)   • Calcium 08/02/2022 7.8 (A)   • Albumin 08/02/2022 3.90    • Phosphorus 08/02/2022 1.2 (A)   • Anion Gap 08/02/2022 11.3    • BUN/Creatinine Ratio 08/02/2022 11.4    • eGFR 08/02/2022 46.6 (A)   Infusion on 07/26/2022   Component Date Value   • Glucose 07/26/2022 156 (A)   • BUN 07/26/2022 22 (A)   • Creatinine 07/26/2022 1.40 (A)   • Sodium 07/26/2022 141    • Potassium 07/26/2022 4.2    • Chloride 07/26/2022 104    • CO2 07/26/2022 23.1    • Calcium 07/26/2022 9.5     • Total Protein 07/26/2022 7.8    • Albumin 07/26/2022 4.00    • ALT (SGPT) 07/26/2022 9    • AST (SGOT) 07/26/2022 14    • Alkaline Phosphatase 07/26/2022 154 (A)   • Total Bilirubin 07/26/2022 <0.2 (A)   • Globulin 07/26/2022 3.8 (A)   • A/G Ratio 07/26/2022 1.1    • BUN/Creatinine Ratio 07/26/2022 15.7    • Anion Gap 07/26/2022 13.9    • eGFR 07/26/2022 43.4 (A)   • WBC 07/26/2022 9.92    • RBC 07/26/2022 3.39 (A)   • Hemoglobin 07/26/2022 10.4 (A)   • Hematocrit 07/26/2022 34.3    • MCV 07/26/2022 101.2 (A)   • MCH 07/26/2022 30.7    • MCHC 07/26/2022 30.3 (A)   • RDW 07/26/2022 12.8    • RDW-SD 07/26/2022 47.1    • MPV 07/26/2022 8.9    • Platelets 07/26/2022 268    • Neutrophil % 07/26/2022 90.7 (A)   • Lymphocyte % 07/26/2022 4.7 (A)   • Monocyte % 07/26/2022 3.8 (A)   • Eosinophil % 07/26/2022 0.0 (A)   • Basophil % 07/26/2022 0.1    • Immature Grans % 07/26/2022 0.7 (A)   • Neutrophils, Absolute 07/26/2022 8.99 (A)   • Lymphocytes, Absolute 07/26/2022 0.47 (A)   • Monocytes, Absolute 07/26/2022 0.38    • Eosinophils, Absolute 07/26/2022 0.00    • Basophils, Absolute 07/26/2022 0.01    • Immature Grans, Absolute 07/26/2022 0.07 (A)   • nRBC 07/26/2022 0.0    • Magnesium 07/26/2022 1.7 (A)   • Phosphorus 07/26/2022 2.9    Infusion on 07/19/2022   Component Date Value   • Reference Lab Report 07/19/2022 [facility]    There may be more visits with results that are not included.       Diagnoses and all orders for this visit:    1. Generalized anxiety disorder (Primary)    Other orders  -     escitalopram (Lexapro) 10 MG tablet; Take 1 tablet by mouth Daily.  Dispense: 30 tablet; Refill: 2    Plan of Care  Considered residual sx on prozac, long half life, potential benefit of alternate SSRI. Will transition to lexapro.  Considered potential activation associated with Wellbutrin, and we will review this medication at next visit, potentially benefiting from reduction.  Discussed with patient benefits of peers  support, adding clinic limitations, and reviewing with patient opportunities to join group therapy with shared medical visit.  Patient is very interested in this approach, and follow-up has been arranged in this manner.    Counseling provided to patient and  regarding impact of caregiver distress, demoralization alongside medical complexity, etc.  Reviewed supportive services available in cancer Center and community, and provided patient and  with resources.

## 2022-08-23 ENCOUNTER — INFUSION (OUTPATIENT)
Dept: ONCOLOGY | Facility: HOSPITAL | Age: 57
End: 2022-08-23

## 2022-08-23 ENCOUNTER — DOCUMENTATION (OUTPATIENT)
Dept: RADIATION ONCOLOGY | Facility: HOSPITAL | Age: 57
End: 2022-08-23

## 2022-08-23 ENCOUNTER — OFFICE VISIT (OUTPATIENT)
Dept: ONCOLOGY | Facility: CLINIC | Age: 57
End: 2022-08-23

## 2022-08-23 ENCOUNTER — CLINICAL SUPPORT (OUTPATIENT)
Dept: OTHER | Facility: HOSPITAL | Age: 57
End: 2022-08-23

## 2022-08-23 ENCOUNTER — APPOINTMENT (OUTPATIENT)
Dept: ONCOLOGY | Facility: HOSPITAL | Age: 57
End: 2022-08-23

## 2022-08-23 VITALS — WEIGHT: 157 LBS | BODY MASS INDEX: 27.82 KG/M2 | HEIGHT: 63 IN

## 2022-08-23 VITALS
OXYGEN SATURATION: 93 % | DIASTOLIC BLOOD PRESSURE: 70 MMHG | HEIGHT: 63 IN | BODY MASS INDEX: 27.87 KG/M2 | WEIGHT: 157.3 LBS | RESPIRATION RATE: 18 BRPM | SYSTOLIC BLOOD PRESSURE: 99 MMHG | TEMPERATURE: 97.1 F | HEART RATE: 79 BPM

## 2022-08-23 DIAGNOSIS — C34.90 NON-SMALL CELL LUNG CANCER METASTATIC TO BONE: Primary | ICD-10-CM

## 2022-08-23 DIAGNOSIS — C79.51 NON-SMALL CELL LUNG CANCER METASTATIC TO BONE: Primary | ICD-10-CM

## 2022-08-23 DIAGNOSIS — Z45.2 ENCOUNTER FOR FITTING AND ADJUSTMENT OF VASCULAR CATHETER: ICD-10-CM

## 2022-08-23 LAB
ALBUMIN SERPL-MCNC: 3.4 G/DL (ref 3.5–5.2)
ALBUMIN/GLOB SERPL: 1.1 G/DL (ref 1.1–2.4)
ALP SERPL-CCNC: 134 U/L (ref 38–116)
ALT SERPL W P-5'-P-CCNC: 9 U/L (ref 0–33)
ANION GAP SERPL CALCULATED.3IONS-SCNC: 12.8 MMOL/L (ref 5–15)
AST SERPL-CCNC: 10 U/L (ref 0–32)
BASOPHILS # BLD AUTO: 0.02 10*3/MM3 (ref 0–0.2)
BASOPHILS NFR BLD AUTO: 0.4 % (ref 0–1.5)
BILIRUB SERPL-MCNC: <0.2 MG/DL (ref 0.2–1.2)
BUN SERPL-MCNC: 7 MG/DL (ref 6–20)
BUN/CREAT SERPL: 6.1 (ref 7.3–30)
CALCIUM SPEC-SCNC: 8.1 MG/DL (ref 8.5–10.2)
CHLORIDE SERPL-SCNC: 105 MMOL/L (ref 98–107)
CO2 SERPL-SCNC: 23.2 MMOL/L (ref 22–29)
CREAT SERPL-MCNC: 1.15 MG/DL (ref 0.6–1.1)
DEPRECATED RDW RBC AUTO: 49.1 FL (ref 37–54)
EGFRCR SERPLBLD CKD-EPI 2021: 55 ML/MIN/1.73
EOSINOPHIL # BLD AUTO: 0.05 10*3/MM3 (ref 0–0.4)
EOSINOPHIL NFR BLD AUTO: 0.9 % (ref 0.3–6.2)
ERYTHROCYTE [DISTWIDTH] IN BLOOD BY AUTOMATED COUNT: 14.2 % (ref 12.3–15.4)
GLOBULIN UR ELPH-MCNC: 3.2 GM/DL (ref 1.8–3.5)
GLUCOSE SERPL-MCNC: 98 MG/DL (ref 74–124)
HCT VFR BLD AUTO: 30.2 % (ref 34–46.6)
HGB BLD-MCNC: 9.1 G/DL (ref 12–15.9)
IMM GRANULOCYTES # BLD AUTO: 0.17 10*3/MM3 (ref 0–0.05)
IMM GRANULOCYTES NFR BLD AUTO: 3.1 % (ref 0–0.5)
LYMPHOCYTES # BLD AUTO: 0.41 10*3/MM3 (ref 0.7–3.1)
LYMPHOCYTES NFR BLD AUTO: 7.4 % (ref 19.6–45.3)
MAGNESIUM SERPL-MCNC: 1.3 MG/DL (ref 1.8–2.5)
MCH RBC QN AUTO: 29.4 PG (ref 26.6–33)
MCHC RBC AUTO-ENTMCNC: 30.1 G/DL (ref 31.5–35.7)
MCV RBC AUTO: 97.4 FL (ref 79–97)
MONOCYTES # BLD AUTO: 1.05 10*3/MM3 (ref 0.1–0.9)
MONOCYTES NFR BLD AUTO: 18.9 % (ref 5–12)
NEUTROPHILS NFR BLD AUTO: 3.87 10*3/MM3 (ref 1.7–7)
NEUTROPHILS NFR BLD AUTO: 69.3 % (ref 42.7–76)
NRBC BLD AUTO-RTO: 0.5 /100 WBC (ref 0–0.2)
PHOSPHATE SERPL-MCNC: 1.2 MG/DL (ref 2.5–4.5)
PLATELET # BLD AUTO: 143 10*3/MM3 (ref 140–450)
PMV BLD AUTO: 10.5 FL (ref 6–12)
POTASSIUM SERPL-SCNC: 3.4 MMOL/L (ref 3.5–4.7)
PROT SERPL-MCNC: 6.6 G/DL (ref 6.3–8)
RBC # BLD AUTO: 3.1 10*6/MM3 (ref 3.77–5.28)
SODIUM SERPL-SCNC: 141 MMOL/L (ref 134–145)
WBC NRBC COR # BLD: 5.57 10*3/MM3 (ref 3.4–10.8)

## 2022-08-23 PROCEDURE — 99214 OFFICE O/P EST MOD 30 MIN: CPT | Performed by: INTERNAL MEDICINE

## 2022-08-23 PROCEDURE — 83735 ASSAY OF MAGNESIUM: CPT

## 2022-08-23 PROCEDURE — 84100 ASSAY OF PHOSPHORUS: CPT

## 2022-08-23 PROCEDURE — 25010000002 MAGNESIUM SULFATE IN D5W 1G/100ML (PREMIX) 1-5 GM/100ML-% SOLUTION: Performed by: INTERNAL MEDICINE

## 2022-08-23 PROCEDURE — 80053 COMPREHEN METABOLIC PANEL: CPT

## 2022-08-23 PROCEDURE — 96366 THER/PROPH/DIAG IV INF ADDON: CPT

## 2022-08-23 PROCEDURE — 85025 COMPLETE CBC W/AUTO DIFF WBC: CPT

## 2022-08-23 PROCEDURE — 25010000002 HEPARIN LOCK FLUSH PER 10 UNITS: Performed by: INTERNAL MEDICINE

## 2022-08-23 PROCEDURE — 96365 THER/PROPH/DIAG IV INF INIT: CPT

## 2022-08-23 PROCEDURE — 25010000002 MAGNESIUM SULFATE 2 GM/50ML SOLUTION: Performed by: INTERNAL MEDICINE

## 2022-08-23 RX ORDER — SODIUM CHLORIDE 0.9 % (FLUSH) 0.9 %
10 SYRINGE (ML) INJECTION AS NEEDED
Status: CANCELLED | OUTPATIENT
Start: 2022-08-23

## 2022-08-23 RX ORDER — SODIUM CHLORIDE 0.9 % (FLUSH) 0.9 %
10 SYRINGE (ML) INJECTION AS NEEDED
Status: DISCONTINUED | OUTPATIENT
Start: 2022-08-23 | End: 2022-08-23 | Stop reason: HOSPADM

## 2022-08-23 RX ORDER — MAGNESIUM SULFATE HEPTAHYDRATE 40 MG/ML
2 INJECTION, SOLUTION INTRAVENOUS ONCE
Status: COMPLETED | OUTPATIENT
Start: 2022-08-23 | End: 2022-08-23

## 2022-08-23 RX ORDER — HEPARIN SODIUM (PORCINE) LOCK FLUSH IV SOLN 100 UNIT/ML 100 UNIT/ML
500 SOLUTION INTRAVENOUS AS NEEDED
Status: CANCELLED | OUTPATIENT
Start: 2022-08-23

## 2022-08-23 RX ORDER — POTASSIUM & SODIUM PHOSPHATES POWDER PACK 280-160-250 MG 280-160-250 MG
2 PACK ORAL
Qty: 120 PACKET | Refills: 1 | Status: SHIPPED | OUTPATIENT
Start: 2022-08-23 | End: 2022-11-30

## 2022-08-23 RX ORDER — MAGNESIUM SULFATE 1 G/100ML
1 INJECTION INTRAVENOUS ONCE
Status: COMPLETED | OUTPATIENT
Start: 2022-08-23 | End: 2022-08-23

## 2022-08-23 RX ORDER — SODIUM CHLORIDE 9 MG/ML
1000 INJECTION, SOLUTION INTRAVENOUS ONCE
Status: COMPLETED | OUTPATIENT
Start: 2022-08-23 | End: 2022-08-23

## 2022-08-23 RX ORDER — POTASSIUM CHLORIDE 20 MEQ/1
40 TABLET, EXTENDED RELEASE ORAL DAILY
Qty: 8 TABLET | Refills: 0 | Status: SHIPPED | OUTPATIENT
Start: 2022-08-23 | End: 2022-08-27

## 2022-08-23 RX ORDER — HEPARIN SODIUM (PORCINE) LOCK FLUSH IV SOLN 100 UNIT/ML 100 UNIT/ML
500 SOLUTION INTRAVENOUS AS NEEDED
Status: DISCONTINUED | OUTPATIENT
Start: 2022-08-23 | End: 2022-08-23 | Stop reason: HOSPADM

## 2022-08-23 RX ADMIN — MAGNESIUM SULFATE 1 G: 1 INJECTION INTRAVENOUS at 10:36

## 2022-08-23 RX ADMIN — MAGNESIUM SULFATE IN WATER 2 G: 40 INJECTION, SOLUTION INTRAVENOUS at 09:35

## 2022-08-23 RX ADMIN — SODIUM CHLORIDE 1000 ML: 9 INJECTION, SOLUTION INTRAVENOUS at 09:22

## 2022-08-23 RX ADMIN — Medication 10 ML: at 11:13

## 2022-08-23 RX ADMIN — Medication 500 UNITS: at 11:14

## 2022-08-23 NOTE — PROGRESS NOTES
Subjective   Holli Patel is a 57 y.o. female.  Referred by Dr. Molina for bilateral breast cancer    History of Present Illness   Ms. 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    3/1/2021-bilateral screening mammogram-microcalcifications seen in the posterior one third of the lateral aspect of the right breast.  Area of focal asymmetry seen in the middle one third of the upper inner quadrant of the left breast.    3/1/2021-DEXA scan-T score of -2.2 on the lumbar spine and T score of -2.3 in the left hip and T score of -1.9 in the right hip.  Findings consistent with osteopenia    3/23/2021-screening lung CT-there is a 10 x 11 mm solid nodule in the left upper lobe.  Enlarged AP window lymph node measuring 14 x 10 mm.  Suggestion of a possible 9 mm left hilar lymph node.  Heavy coronary artery calcification.    3/23/2021-diagnostic mammogram bilateral-cluster of microcalcifications in the middle third lateral aspect of the right breast.  Left breast demonstrates persistence of the area of focal asymmetry in the region.    Ultrasound-left breast ultrasound at 10 o'clock position, 6 cm from the nipple there is a 0.4 cm irregular hypoechoic lesion.  Stereotactic biopsy of the right breast calcifications recommended.  Ultrasound-guided biopsy of the left breast lesion recommended    4/7/2021-right breast stereotactic biopsy and left breast ultrasound-guided biopsy  Pathology  Right breast-invasive ductal carcinoma, grade 2, lymphovascular invasion present, ER +99% strong, KS +80% moderate, HER-2 negative, Ki-67 12%  Left breast upper inner quadrant 10 o'clock position biopsy, invasive ductal carcinoma, grade   2, ER +99% strong, KS +40% strong, HER-2 2+ on immunohistochemistry, nonamplified on FISH Ki-67 10%    4/14/2021-PET/CT-FDG avid aortopulmonary window lymph node measures 0.9 cm with an SUV of 7.5.  FDG avid left hilar  lymph node measures 1 cm with an SUV of 17.2.  Irregular soft tissue density in the right breast measuring 3.7 x 3.8 cm is favored to be secondary to the recent breast biopsy.  1.1 cm pulmonary nodule within the left upper lobe with SUV 9.7 .  Sub-6 mm pulmonary nodules are present in the right lower lobe.  No evidence of lymphadenopathy or metastatic disease in the abdomen.  Focal area of FDG uptake within the central canal posterior to T11-T12 displaced demonstrating an SUV of 5.5 thought to be reactive however MRI is recommended for further evaluation.    She was seen by Dr. Molina who initially planned for breast surgery however  due to the PET abnormalities she has been referred to Dr. Kerr who plans to do a wound on 4/30/2021.  Dr. Molina's and Dr. Kerr's notes reviewed.    Patient denies any family history of breast cancer.  Her mother had lymphoma.  Maternal grandmother had colon cancer.  Prior to that screening mammogram she did not have any palpable abnormalities of either breast.    She has been a heavy smoker for about 40 years and smoked 2 packs/day.  Denies any recent weight changes, new bone pains, cough, abdominal pain nausea vomiting constipation or diarrhea.  She does have anxiety and has been on several medications.  She has recently been started on Xanax to help with the mood and also with insomnia.    Patient had a video-assisted thoracoscopy and mediastinal lymphadenectomy on 4/30/2021.  L5-L6 lymph nodes were biopsied however the small pulmonary nodule in the left upper lobe was not difficult to find.  Pathology showed moderately differentiated adenocarcinoma which is CK7 and TTF-1 positive.  Consistent with lung primary.  Ki-67 85%.    5/14/2021-MRI of the brain-minimal chronic small vessel ischemic change in the white matter.  Otherwise negative MRI.    5/20/2021-bilateral axillary ultrasound-no evidence of axillary lymphadenopathy in either axilla.  Normal-appearing bilateral axillary  lymph nodes visualized.    Cycle 1 cisplatin and Alimta on 5/25/2021.    Cycle 2 cisplatin Alimta 6/15/2021    Cycle 3 cisplatin Alimta 7/7/2021    Completed radiation on 7/12/2021    Port was nonfunctional hence a port study was performed which showed that the port was backed up into the innominate vein and also there was a nonocclusive thrombus at the end of the catheter extending into the superior vena cava.  She was started on anticoagulation with Eliquis.  It was recommended for port revision of the intention to keep the port.    PET/CT 8/5/2021-images independently reviewed and interpreted by me-decrease in size and FDG uptake of the left upper lobe pulmonary nodule as well as mediastinal and left hilar lymphadenopathy representing response to treatment.  Sub-6 mm pulmonary nodule in the left upper lobe new since 4/14/2021.  This could be related to radiation however follow-up CT in 3 months recommended.  Decrease in size of the irregular masslike tissue in the right breast which is postbiopsy hematoma.  This is not PET avid.  New segmental left eighth rib fractures healing.  A new band of sclerosis of the left seventh rib which favored to represent healing nondisplaced fracture.  Follow-up CT recommended.  focal uptake in the duodenum first and second portions.  Could be related to duodenitis.  Indeterminate lesion in the L1 vertebral body not well evaluated on PET/CT.    10/1/2021-MRI of the lumbar spine.  Lesion in the left posterior body of L1 measures 14 x 14 x 12 mm and previously 5 x 5 x 6 mm.  The interval enlargement strongly suggest that it is metastatic lesion.  No additional osseous metastasis noted.   Other chronic changes noted.    10/14/2021-biopsy of the lumbar spine lesion-pathology consistent with poorly differentiated carcinoma.  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    10/26/2021-brain MRI-no evidence of  metastatic disease.    10/26/2021-bilateral diagnostic mammogram and ultrasound  Scattered fibroglandular density in both breast.  Postbiopsy hematoma with internal biopsy clip in the upper outer quadrant of the right breast, 8 cm from the nipple.  The hematoma size is decreased to 2.9 cm from 3.6 cm earlier.    Left breast-parenchymal density measuring 9 x 10 mm has markedly decreased.  Few adjacent calcifications which are unchanged.  No new abnormality in the left breast.  At 10:00 region there is some minimal adjacent hypoechoic texture which is difficult to measure but appears smaller since the previous exam.    10/28/2021-PET/CT  New L1 and L2 lytic bone metastasis annually intensely hypermetabolic focus at the left adrenal gland likely represents metastatic disease as well.  Slight decrease in size of the 0.9 x 0.7 cm left upper lobe pulmonary nodule.  There is hypermetabolic activity related to radiation pneumonitis.  The remainder of the nodule is photopenic.  Uncertain etiology of the more intense activity along the right lateral peripheral margin of the 2.6 cm postbiopsy density of the right breast.    She completed radiation to to the lumbar spine on 11/19/2021 11/22/2021-cycle 1 Alimta and Keytruda    3/23/2022-bilateral diagnostic mammogram and ultrasound  Complete resolution of the microcalcifications in the upper outer quadrant of the right breast and decrease in size of the postbiopsy hematoma.  No suspicious abnormalities in the right breast.  Benign-appearing calcifications at the site of malignancy in the left breast upper inner quadrant.  No other suspicious findings.    4/11/2022 PET/CT-there is new groundglass opacities in the left upper lobe which are most likely postradiation changes.  Other groundglass opacities in the left lung as well as the right lung remained stable.  Increased nodularity of the left adrenal gland with an SUV of 5.6, previously 3.5.    Increased uptake in the L1  vertebra in the right posterior aspect with an SUV of 3.6.  Left L1 sclerosis increased    Due to this the case was discussed in multidisciplinary conference.  The disease progression was significant in the left adrenal gland as well as the L1 vertebra.  Since there were only 2 areas of disease progression it has been decided to proceed with radiation to these 2 spots and continue on Keytruda and Alimta.    Patient also has had worsening of her kidney function.  We initially held treatment as of 5/31/2022 and creatinine bumped up to 2.0, BUN 23.  We then resumed therapy 6/11/2022 with Keytruda alone.  Patient was referred to nephrology.    6/6/2022-MRI of the spine-diffuse marrow replacement in the L1 vertebral body and inferior endplate concavity.  Suspicious for metastatic disease with pathological fracture in the inferior endplate.  Also diffuse signal abnormality in the L2 lamina on the left suspicious for additional metastatic disease.  Abnormality of the first sacral segment suspicious for metastatic disease.  Left adrenal gland appears enlarged.  30 to 40% height loss and L2 vertebral body suggestive of a subacute compression fracture.  Degenerative changes.    7/18/2022-PET/CT  Multiple hypermetabolic osseous lesions with index lesions which have either increased in degree of FDG uptake or newly hypermetabolic lesions representative of metastatic disease and progression of disease.  Possible FDG avid lesion in the left femoral diaphysis however these are incompletely visualized and in the area of artifactual FDG uptake.  MRI of the left femur recommended for further evaluation.  Increasing FDG uptake of the left adrenal gland.  Focal left hilar hypermetabolic him corresponds to the lymph node which has minimally increased in size compared to April 2022.  New sub-6 mm pulmonary nodules.    7/26/2022-cycle 1 of Taxotere    Patient was admitted to the hospital 8/3/2021 discharged on 8/9/2022.  She was admitted  for strokelike symptoms and confusion.  The confusion was thought to be secondary to polypharmacy and probably febrile neutropenia.  She was treated with antibiotics.  Mental status improved subsequently.  MRI of the cervical thoracic and lumbar spine was performed on 8/4/2022.  Images independently reviewed by me.  Multiple metastatic lesions in the spine noted.  There was a lesion on the CT which was concerning.  There is also a sacral Lesion measuring up to 5 cm in transverse dimension on the right.  Patient was symptomatic with pain on the right side of the sacrum.  Radiation oncology recommended radiation to the cervical spine as well as sacrum.  CT head without any obvious abnormalities.    Completed radiation to the cervical spine and sacrum on 8/12/2022.    Interval history:  Holli Patel is a 57 y.o. female with the above-mentioned history returns to the office today for scheduled follow-up.    She presents today for follow-up.  She is accompanied by her .  She reports feeling dizzy this morning.  Blood pressure somewhat low at 99/70.  Heart rate normal.  Pain is manageable with the Percocets.  Neuropathy unchanged.    The following portions of the patient's history were reviewed and updated as appropriate: allergies, current medications, past family history, past medical history, past social history, past surgical history and problem list.    Past Medical History:   Diagnosis Date   • Anxiety    • Clot     POSSIBLE BLOOD CLOT IN VENOUS ACCESS DEVICE-PT STATES WAS STARTED ON ELIQUIS    • Dyspnea on exertion    • Elevated cholesterol    • Frequent urination     DAY AND NIGHT   • SHAYY (generalized anxiety disorder)     RELATED HIGH BLOOD PRESSURE   • GERD (gastroesophageal reflux disease)    • High blood pressure     ANXIETY RELATED   • Hyperlipidemia    • Hypothyroidism    • Lung cancer (HCC)    • Lung nodule     LEFT   • Malignant neoplasm of upper-outer quadrant of right breast in female,  estrogen receptor positive (HCC) 2021    PT STATES HAS BILAT BREAST CANCER   • Migraine    • PONV (postoperative nausea and vomiting)         Past Surgical History:   Procedure Laterality Date   • BREAST BIOPSY Bilateral 2021    Right Breast 10 o'clock & Left breast 10 o'clock 6 cm from nipple, [facility]   • CLOSED REDUCTION HIP DISLOCATION Left 2021    Procedure: BRONCHOSCOPY, LEFT VIDEO ASSITED THORACOSCOPY, ROBOTIC ASSSITED MEDIASTINAL LYMPHADENECTOMY WITH INTERCOSTAL NERVE BLOCKS;  Surgeon: Raphael Kerr III, MD;  Location: [facility];  Service: DaVinci;  Laterality: Left;   • COLONOSCOPY     • TUBAL ABDOMINAL LIGATION     • VENOUS ACCESS DEVICE (PORT) INSERTION Right 2021    Procedure: INSERTION VENOUS ACCESS DEVICE;  Surgeon: Raphael Kerr III, MD;  Location: [facility];  Service: Thoracic;  Laterality: Right;   • VENOUS ACCESS DEVICE (PORT) INSERTION Right 2021    Procedure: REVISION AND REPLACEMENT RIGHT SUBCLAVIAN VENOUS ACCESS PORT;  Surgeon: Raphael Kerr III, MD;  Location: [facility] OR;  Service: Thoracic;  Laterality: Right;   • WRIST SURGERY Right         Family History   Problem Relation Age of Onset   • Cancer Father         LYMPHATIC   • Prostate cancer Father    • Lymphoma Father    • Alcohol abuse Brother    • Colon cancer Maternal Grandmother    • Malig Hyperthermia Neg Hx         Social History     Socioeconomic History   • Marital status:      Spouse name: Jelani   Tobacco Use   • Smoking status: Former Smoker     Packs/day: 1.00     Years: 30.00     Pack years: 30.00     Types: Electronic Cigarette, Cigarettes     Start date: 1981     Quit date:      Years since quittin.6   • Smokeless tobacco: Never Used   • Tobacco comment: ABT 15 CIGARETTES DAILY   Vaping Use   • Vaping Use: Some days   • Substances: Nicotine, Flavoring   • Devices: Disposable   Substance and Sexual Activity   • Alcohol use: Never   • Drug use: Never   • Sexual  "activity: Yes     Partners: Male        OB History        2    Para   2    Term   2            AB        Living           SAB        IAB        Ectopic        Molar        Multiple        Live Births                Age of menarche-10  Age at menopause-42  Oral contraceptive pill use-15 years  No HRT use  She has 2 children  Age at first live childbirth-17    No Known Allergies     Review of Systems   Review of systems as mentioned in the HPI    Objective   Blood pressure 99/70, pulse 79, temperature 97.1 °F (36.2 °C), temperature source Temporal, resp. rate 18, height 160 cm (62.99\"), weight 71.4 kg (157 lb 4.8 oz), SpO2 93 %, not currently breastfeeding.       Physical Exam  Vitals reviewed.   HENT:      Head: Normocephalic.   Eyes:      Pupils: Pupils are equal, round, and reactive to light.   Cardiovascular:      Rate and Rhythm: Normal rate and regular rhythm.      Pulses: Normal pulses.      Heart sounds: Normal heart sounds.   Pulmonary:      Effort: Pulmonary effort is normal.      Breath sounds: Normal breath sounds.   Abdominal:      General: Abdomen is flat.   Musculoskeletal:         General: No swelling. Normal range of motion.      Cervical back: Normal range of motion.   Skin:     General: Skin is warm.   Neurological:      General: No focal deficit present.      Mental Status: She is alert and oriented to person, place, and time.   Psychiatric:         Mood and Affect: Mood normal.         Behavior: Behavior normal.        I have reexamined the patient and the results are consistent with the previously documented exam. Salma Snell MD       Results from last 7 days   Lab Units 22  0832   WBC 10*3/mm3 5.57   NEUTROS ABS 10*3/mm3 3.87   HEMOGLOBIN g/dL 9.1*   HEMATOCRIT % 30.2*   PLATELETS 10*3/mm3 143                  CT Head Without Contrast    Result Date: 2022  1.  No evidence of acute infarction or of intracranial hemorrhage. No obvious metastatic disease is identified " intracranially on this noncontrasted CT examination of the brain. Further evaluation could be performed with a MRI examination of the brain with and without contrast. 2.  The PET examination 07/18/2022 demonstrated a lytic lesion involving the lamina of C2 on the right posteriorly extending to the anterior aspect of the spinous process measuring 17 mm in size. This is new versus 04/11/2022. Further evaluation with a MRI examination of the cervical spine with and without contrast is recommended.  The above information was called to and discussed with Conor Kelly.    Radiation dose reduction techniques were utilized, including automated exposure control and exposure modulation based on body size.  This report was finalized on 8/4/2022 7:30 AM by Dr. Brenton Parham M.D.      CT Cervical Spine Without Contrast    Result Date: 8/8/2022  There is a lytic lesion involving the posterior elements of C2 involving predominantly the mid and posterior aspect of the right lamina, extending to the spinous process and to the posterior aspect of the left lamina. There is extensive cortical disruption involving the lamina on the right posteriorly. There is no evidence of a discrete fracture involving the right or left lamina. The posterior lamina of C1 on the right is disrupted anteriorly posteriorly, extending to the midline. There was a mild degree of epidural enhancement posteriorly. The appearance is nonspecific. 1 to 1.5 mm thick area of epidural extension is suspected at this level. A follow-up MRI examination cervical spine with and without contrast is suggested.    Radiation dose reduction techniques were utilized, including automated exposure control and exposure modulation based on body size.  This report was finalized on 8/8/2022 9:47 AM by Dr. Brenton Parham M.D.      MRI Brain With & Without Contrast    Result Date: 8/5/2022  The study is hampered somewhat by patient motion but there was no evidence of enhancement to  suggest calvarial or intra-axial metastatic disease. Mild small vessel ischemic disease is noted. There is no evidence of acute infarction.   MRI EXAMINATION OF THE CERVICAL SPINE WITH AND WITHOUT CONTRAST:  The study is hampered by patient motion.  The alignment of the cervical spine is within normal limits. Signal intensity within the cord is normal on the sagittal T2 sequence.  The sagittal T2 sequence demonstrates increased signal intensity involving the lamina of C2 on the right, extending to and involving the spinous process as well as crossing midline to the left involving the posterior aspect of the C2 lamina on the left. The fat-saturated sequence demonstrates increased signal intensity involving the soft tissues adjacent to the lamina and spinous process of C2. The area of T2 hyperintensity within the soft tissues measures approximately 1.5 cm in cranial caudal dimension and 1.6 cm in AP dimension. After contrast administration.  There was enhancement of the lamina of C2 bilaterally, more prominent on the right as well as involving the spinous process. There is dural enhancement extending from the level of the inferior aspect of C2 to the inferior aspect of C1 posteriorly. The axial T1 noncontrast sequence demonstrates linear areas of signal loss involving the posterior laminar bilaterally. Pathologic fractures cannot be excluded.  C2-3: There is no evidence of disc bulge or herniation.  C3-4: There is no evidence of disc bulge or herniation.  C4-5: A minimal central disc bulge is present.  C5-6: Mild facet degenerative disease is present bilaterally.  C6-7: There is no evidence of disc bulge or herniation.  C7-T1: There is no evidence of disc bulge or herniation.  IMPRESSION: 1.  Edema and enhancement involving the lamina of C2 to the right extending to and involving the spinous process and posterior aspect of the lamina of C2 is appreciated consistent with metastatic disease. There is also adjacent edema  and enhancement within the soft tissues adjacent to the posterior elements of C2. There is mild dural enhancement posteriorly from the inferior aspect of the posterior arch of C1 to the inferior aspect of C2. The appearance is nonspecific. The degree of T2 hyperintensity and enhancement is more than typically seen with a metastatic lesion. The axial T1 precontrast sequence demonstrates linear areas of signal loss involving the lamina bilaterally. Fractures cannot be excluded. A CT examination of the cervical spine without contrast is suggested. 2.  Mild degenerative disease involving the cervical spine as described with no evidence of disc herniation. 3.  No evidence of cord signal abnormality or cord compression.   MRI EXAMINATION OF THE THORACIC SPINE WITH AND WITHOUT CONTRAST:  The alignment of the thoracic spine is within normal limits. There is a compression deformity involving the T7 vertebral body with loss of approximately 50% of vertical height anteriorly. Loss of vertical height appears similar to the PET examination of 07/18/2022, the PET examination of 04/11/2022, but is new versus the PET examination of 01/18/2022. There was mild edema and enhancement involving the superior endplate of T7 anteriorly.  A 5 mm area of enhancement is appreciated involving the superior aspect of the spinous process of T12 consistent with a metastatic lesion.  There is no evidence of disc herniation, canal stenosis or cord compression. No other areas of enhancement to suggest metastatic disease are identified.  IMPRESSION: 1.  There is a compression deformity involving T7 with mild edema and enhancement suggesting an acute to subacute fracture involving the superior endplate anteriorly. This is new as compared to the PET examination of 01/18/2022 but loss of vertical height appears similar to the PET examination of 04/11/2022. The appearance is nonspecific. This may represent a benign osteoporotic compression fracture.  Underlying metastatic disease cannot be entirely excluded. 2.  There is a 5 mm metastatic lesion involving the superior aspect of the spinous process of T12. 3.  No evidence of intradural enhancement, cord compression or cord signal abnormality. There is no evidence of disc herniation.   MRI EXAMINATION OF THE LUMBAR SPINE WITH AND WITHOUT CONTRAST:  The alignment of the lumbar spine is within normal limits. There is increased signal intensity involving the marrow of the lumbar spine which suggests radiation related changes. Signal loss throughout the L1 vertebral body is noted on the T1 sequence consistent with metastatic disease. There is a mild concave deformity involving the inferior endplate of L1 and to a lesser extent superior endplate of L1. A fracture plane is noted paralleling the inferior endplate. Edema tracks into the pedicles bilaterally. Signal loss and enhancement is appreciated involving the superior articulating facet of L2 to the left consistent with metastatic disease. A 5 mm metastatic lesion involving the spinous process of T12 superiorly is again noted. A large metastatic lesion is appreciated involving the sacral ala on the right. This measures approximately 5 cm in transverse dimension.  The conus is at T12-L1 and the caudal aspect of the spinal cord appears unremarkable.  T12-L1: There is no evidence of disc bulge or herniation.  L1-L2: A mild broad-based disc osteophyte complex is present which is more prominent to the right.  L2-L3: There is no evidence of disc bulge or herniation.  L3-L4: A mild broad-based disc bulge is present with no evidence of herniation.  L4-L5: Mild facet degenerative disease is present bilaterally. A mild central disc osteophyte complex is present with no evidence of herniation.  L5-S1: Moderate-to-severe facet degenerative disease is present bilaterally. Moderate foraminal stenosis is present on the right and there is moderate-to-severe foraminal stenosis on the  left secondary to loss of disc height and extension of a disc osteophyte complex into the neural foramen.  IMPRESSION: 1.  Multifocal metastatic disease involving the lumbar spine and sacrum is noted as described above with the largest lesion involving the sacral ala to the right measuring approximately 5 cm in transverse dimension. Metastatic involvement of L1 is appreciated throughout the vertebral body and extending to the pedicles bilaterally. There is no evidence of epidural extension. Smaller metastatic foci are appreciated involving the superior articulating facet of L1 to the left and the superior aspect of the spinous process of T12. 2.  Loss of vertical height at L2 is appreciated estimated to be approximately 25% in severity. There is edema paralleling the superior endplate. The mild compression deformity at L2 is new versus the MRI examination of 10/01/2021.  The above information was called to and discussed with Dr. Jade on 08/04/2022 at 1240 hours.  This report was finalized on 8/5/2022 9:05 AM by Dr. Brenton Parham M.D.      MRI Cervical Spine With & Without Contrast    Result Date: 8/5/2022  The study is hampered somewhat by patient motion but there was no evidence of enhancement to suggest calvarial or intra-axial metastatic disease. Mild small vessel ischemic disease is noted. There is no evidence of acute infarction.   MRI EXAMINATION OF THE CERVICAL SPINE WITH AND WITHOUT CONTRAST:  The study is hampered by patient motion.  The alignment of the cervical spine is within normal limits. Signal intensity within the cord is normal on the sagittal T2 sequence.  The sagittal T2 sequence demonstrates increased signal intensity involving the lamina of C2 on the right, extending to and involving the spinous process as well as crossing midline to the left involving the posterior aspect of the C2 lamina on the left. The fat-saturated sequence demonstrates increased signal intensity involving the soft tissues  adjacent to the lamina and spinous process of C2. The area of T2 hyperintensity within the soft tissues measures approximately 1.5 cm in cranial caudal dimension and 1.6 cm in AP dimension. After contrast administration.  There was enhancement of the lamina of C2 bilaterally, more prominent on the right as well as involving the spinous process. There is dural enhancement extending from the level of the inferior aspect of C2 to the inferior aspect of C1 posteriorly. The axial T1 noncontrast sequence demonstrates linear areas of signal loss involving the posterior laminar bilaterally. Pathologic fractures cannot be excluded.  C2-3: There is no evidence of disc bulge or herniation.  C3-4: There is no evidence of disc bulge or herniation.  C4-5: A minimal central disc bulge is present.  C5-6: Mild facet degenerative disease is present bilaterally.  C6-7: There is no evidence of disc bulge or herniation.  C7-T1: There is no evidence of disc bulge or herniation.  IMPRESSION: 1.  Edema and enhancement involving the lamina of C2 to the right extending to and involving the spinous process and posterior aspect of the lamina of C2 is appreciated consistent with metastatic disease. There is also adjacent edema and enhancement within the soft tissues adjacent to the posterior elements of C2. There is mild dural enhancement posteriorly from the inferior aspect of the posterior arch of C1 to the inferior aspect of C2. The appearance is nonspecific. The degree of T2 hyperintensity and enhancement is more than typically seen with a metastatic lesion. The axial T1 precontrast sequence demonstrates linear areas of signal loss involving the lamina bilaterally. Fractures cannot be excluded. A CT examination of the cervical spine without contrast is suggested. 2.  Mild degenerative disease involving the cervical spine as described with no evidence of disc herniation. 3.  No evidence of cord signal abnormality or cord compression.   MRI  EXAMINATION OF THE THORACIC SPINE WITH AND WITHOUT CONTRAST:  The alignment of the thoracic spine is within normal limits. There is a compression deformity involving the T7 vertebral body with loss of approximately 50% of vertical height anteriorly. Loss of vertical height appears similar to the PET examination of 07/18/2022, the PET examination of 04/11/2022, but is new versus the PET examination of 01/18/2022. There was mild edema and enhancement involving the superior endplate of T7 anteriorly.  A 5 mm area of enhancement is appreciated involving the superior aspect of the spinous process of T12 consistent with a metastatic lesion.  There is no evidence of disc herniation, canal stenosis or cord compression. No other areas of enhancement to suggest metastatic disease are identified.  IMPRESSION: 1.  There is a compression deformity involving T7 with mild edema and enhancement suggesting an acute to subacute fracture involving the superior endplate anteriorly. This is new as compared to the PET examination of 01/18/2022 but loss of vertical height appears similar to the PET examination of 04/11/2022. The appearance is nonspecific. This may represent a benign osteoporotic compression fracture. Underlying metastatic disease cannot be entirely excluded. 2.  There is a 5 mm metastatic lesion involving the superior aspect of the spinous process of T12. 3.  No evidence of intradural enhancement, cord compression or cord signal abnormality. There is no evidence of disc herniation.   MRI EXAMINATION OF THE LUMBAR SPINE WITH AND WITHOUT CONTRAST:  The alignment of the lumbar spine is within normal limits. There is increased signal intensity involving the marrow of the lumbar spine which suggests radiation related changes. Signal loss throughout the L1 vertebral body is noted on the T1 sequence consistent with metastatic disease. There is a mild concave deformity involving the inferior endplate of L1 and to a lesser extent  superior endplate of L1. A fracture plane is noted paralleling the inferior endplate. Edema tracks into the pedicles bilaterally. Signal loss and enhancement is appreciated involving the superior articulating facet of L2 to the left consistent with metastatic disease. A 5 mm metastatic lesion involving the spinous process of T12 superiorly is again noted. A large metastatic lesion is appreciated involving the sacral ala on the right. This measures approximately 5 cm in transverse dimension.  The conus is at T12-L1 and the caudal aspect of the spinal cord appears unremarkable.  T12-L1: There is no evidence of disc bulge or herniation.  L1-L2: A mild broad-based disc osteophyte complex is present which is more prominent to the right.  L2-L3: There is no evidence of disc bulge or herniation.  L3-L4: A mild broad-based disc bulge is present with no evidence of herniation.  L4-L5: Mild facet degenerative disease is present bilaterally. A mild central disc osteophyte complex is present with no evidence of herniation.  L5-S1: Moderate-to-severe facet degenerative disease is present bilaterally. Moderate foraminal stenosis is present on the right and there is moderate-to-severe foraminal stenosis on the left secondary to loss of disc height and extension of a disc osteophyte complex into the neural foramen.  IMPRESSION: 1.  Multifocal metastatic disease involving the lumbar spine and sacrum is noted as described above with the largest lesion involving the sacral ala to the right measuring approximately 5 cm in transverse dimension. Metastatic involvement of L1 is appreciated throughout the vertebral body and extending to the pedicles bilaterally. There is no evidence of epidural extension. Smaller metastatic foci are appreciated involving the superior articulating facet of L1 to the left and the superior aspect of the spinous process of T12. 2.  Loss of vertical height at L2 is appreciated estimated to be approximately 25%  in severity. There is edema paralleling the superior endplate. The mild compression deformity at L2 is new versus the MRI examination of 10/01/2021.  The above information was called to and discussed with Dr. Jade on 08/04/2022 at 1240 hours.  This report was finalized on 8/5/2022 9:05 AM by Dr. Brenton Parham M.D.      MRI Thoracic Spine With & Without Contrast    Result Date: 8/5/2022  The study is hampered somewhat by patient motion but there was no evidence of enhancement to suggest calvarial or intra-axial metastatic disease. Mild small vessel ischemic disease is noted. There is no evidence of acute infarction.   MRI EXAMINATION OF THE CERVICAL SPINE WITH AND WITHOUT CONTRAST:  The study is hampered by patient motion.  The alignment of the cervical spine is within normal limits. Signal intensity within the cord is normal on the sagittal T2 sequence.  The sagittal T2 sequence demonstrates increased signal intensity involving the lamina of C2 on the right, extending to and involving the spinous process as well as crossing midline to the left involving the posterior aspect of the C2 lamina on the left. The fat-saturated sequence demonstrates increased signal intensity involving the soft tissues adjacent to the lamina and spinous process of C2. The area of T2 hyperintensity within the soft tissues measures approximately 1.5 cm in cranial caudal dimension and 1.6 cm in AP dimension. After contrast administration.  There was enhancement of the lamina of C2 bilaterally, more prominent on the right as well as involving the spinous process. There is dural enhancement extending from the level of the inferior aspect of C2 to the inferior aspect of C1 posteriorly. The axial T1 noncontrast sequence demonstrates linear areas of signal loss involving the posterior laminar bilaterally. Pathologic fractures cannot be excluded.  C2-3: There is no evidence of disc bulge or herniation.  C3-4: There is no evidence of disc bulge or  herniation.  C4-5: A minimal central disc bulge is present.  C5-6: Mild facet degenerative disease is present bilaterally.  C6-7: There is no evidence of disc bulge or herniation.  C7-T1: There is no evidence of disc bulge or herniation.  IMPRESSION: 1.  Edema and enhancement involving the lamina of C2 to the right extending to and involving the spinous process and posterior aspect of the lamina of C2 is appreciated consistent with metastatic disease. There is also adjacent edema and enhancement within the soft tissues adjacent to the posterior elements of C2. There is mild dural enhancement posteriorly from the inferior aspect of the posterior arch of C1 to the inferior aspect of C2. The appearance is nonspecific. The degree of T2 hyperintensity and enhancement is more than typically seen with a metastatic lesion. The axial T1 precontrast sequence demonstrates linear areas of signal loss involving the lamina bilaterally. Fractures cannot be excluded. A CT examination of the cervical spine without contrast is suggested. 2.  Mild degenerative disease involving the cervical spine as described with no evidence of disc herniation. 3.  No evidence of cord signal abnormality or cord compression.   MRI EXAMINATION OF THE THORACIC SPINE WITH AND WITHOUT CONTRAST:  The alignment of the thoracic spine is within normal limits. There is a compression deformity involving the T7 vertebral body with loss of approximately 50% of vertical height anteriorly. Loss of vertical height appears similar to the PET examination of 07/18/2022, the PET examination of 04/11/2022, but is new versus the PET examination of 01/18/2022. There was mild edema and enhancement involving the superior endplate of T7 anteriorly.  A 5 mm area of enhancement is appreciated involving the superior aspect of the spinous process of T12 consistent with a metastatic lesion.  There is no evidence of disc herniation, canal stenosis or cord compression. No other  areas of enhancement to suggest metastatic disease are identified.  IMPRESSION: 1.  There is a compression deformity involving T7 with mild edema and enhancement suggesting an acute to subacute fracture involving the superior endplate anteriorly. This is new as compared to the PET examination of 01/18/2022 but loss of vertical height appears similar to the PET examination of 04/11/2022. The appearance is nonspecific. This may represent a benign osteoporotic compression fracture. Underlying metastatic disease cannot be entirely excluded. 2.  There is a 5 mm metastatic lesion involving the superior aspect of the spinous process of T12. 3.  No evidence of intradural enhancement, cord compression or cord signal abnormality. There is no evidence of disc herniation.   MRI EXAMINATION OF THE LUMBAR SPINE WITH AND WITHOUT CONTRAST:  The alignment of the lumbar spine is within normal limits. There is increased signal intensity involving the marrow of the lumbar spine which suggests radiation related changes. Signal loss throughout the L1 vertebral body is noted on the T1 sequence consistent with metastatic disease. There is a mild concave deformity involving the inferior endplate of L1 and to a lesser extent superior endplate of L1. A fracture plane is noted paralleling the inferior endplate. Edema tracks into the pedicles bilaterally. Signal loss and enhancement is appreciated involving the superior articulating facet of L2 to the left consistent with metastatic disease. A 5 mm metastatic lesion involving the spinous process of T12 superiorly is again noted. A large metastatic lesion is appreciated involving the sacral ala on the right. This measures approximately 5 cm in transverse dimension.  The conus is at T12-L1 and the caudal aspect of the spinal cord appears unremarkable.  T12-L1: There is no evidence of disc bulge or herniation.  L1-L2: A mild broad-based disc osteophyte complex is present which is more prominent to  the right.  L2-L3: There is no evidence of disc bulge or herniation.  L3-L4: A mild broad-based disc bulge is present with no evidence of herniation.  L4-L5: Mild facet degenerative disease is present bilaterally. A mild central disc osteophyte complex is present with no evidence of herniation.  L5-S1: Moderate-to-severe facet degenerative disease is present bilaterally. Moderate foraminal stenosis is present on the right and there is moderate-to-severe foraminal stenosis on the left secondary to loss of disc height and extension of a disc osteophyte complex into the neural foramen.  IMPRESSION: 1.  Multifocal metastatic disease involving the lumbar spine and sacrum is noted as described above with the largest lesion involving the sacral ala to the right measuring approximately 5 cm in transverse dimension. Metastatic involvement of L1 is appreciated throughout the vertebral body and extending to the pedicles bilaterally. There is no evidence of epidural extension. Smaller metastatic foci are appreciated involving the superior articulating facet of L1 to the left and the superior aspect of the spinous process of T12. 2.  Loss of vertical height at L2 is appreciated estimated to be approximately 25% in severity. There is edema paralleling the superior endplate. The mild compression deformity at L2 is new versus the MRI examination of 10/01/2021.  The above information was called to and discussed with Dr. Jade on 08/04/2022 at 1240 hours.  This report was finalized on 8/5/2022 9:05 AM by Dr. Brenton Parham M.D.       Renal Bilateral    Result Date: 8/5/2022  Negative.  This report was finalized on 8/5/2022 6:24 PM by Dr. Jelani Santacruz M.D.      MRI Lumbar Spine With & Without Contrast    Result Date: 8/5/2022  The study is hampered somewhat by patient motion but there was no evidence of enhancement to suggest calvarial or intra-axial metastatic disease. Mild small vessel ischemic disease is noted. There is no  evidence of acute infarction.   MRI EXAMINATION OF THE CERVICAL SPINE WITH AND WITHOUT CONTRAST:  The study is hampered by patient motion.  The alignment of the cervical spine is within normal limits. Signal intensity within the cord is normal on the sagittal T2 sequence.  The sagittal T2 sequence demonstrates increased signal intensity involving the lamina of C2 on the right, extending to and involving the spinous process as well as crossing midline to the left involving the posterior aspect of the C2 lamina on the left. The fat-saturated sequence demonstrates increased signal intensity involving the soft tissues adjacent to the lamina and spinous process of C2. The area of T2 hyperintensity within the soft tissues measures approximately 1.5 cm in cranial caudal dimension and 1.6 cm in AP dimension. After contrast administration.  There was enhancement of the lamina of C2 bilaterally, more prominent on the right as well as involving the spinous process. There is dural enhancement extending from the level of the inferior aspect of C2 to the inferior aspect of C1 posteriorly. The axial T1 noncontrast sequence demonstrates linear areas of signal loss involving the posterior laminar bilaterally. Pathologic fractures cannot be excluded.  C2-3: There is no evidence of disc bulge or herniation.  C3-4: There is no evidence of disc bulge or herniation.  C4-5: A minimal central disc bulge is present.  C5-6: Mild facet degenerative disease is present bilaterally.  C6-7: There is no evidence of disc bulge or herniation.  C7-T1: There is no evidence of disc bulge or herniation.  IMPRESSION: 1.  Edema and enhancement involving the lamina of C2 to the right extending to and involving the spinous process and posterior aspect of the lamina of C2 is appreciated consistent with metastatic disease. There is also adjacent edema and enhancement within the soft tissues adjacent to the posterior elements of C2. There is mild dural  enhancement posteriorly from the inferior aspect of the posterior arch of C1 to the inferior aspect of C2. The appearance is nonspecific. The degree of T2 hyperintensity and enhancement is more than typically seen with a metastatic lesion. The axial T1 precontrast sequence demonstrates linear areas of signal loss involving the lamina bilaterally. Fractures cannot be excluded. A CT examination of the cervical spine without contrast is suggested. 2.  Mild degenerative disease involving the cervical spine as described with no evidence of disc herniation. 3.  No evidence of cord signal abnormality or cord compression.   MRI EXAMINATION OF THE THORACIC SPINE WITH AND WITHOUT CONTRAST:  The alignment of the thoracic spine is within normal limits. There is a compression deformity involving the T7 vertebral body with loss of approximately 50% of vertical height anteriorly. Loss of vertical height appears similar to the PET examination of 07/18/2022, the PET examination of 04/11/2022, but is new versus the PET examination of 01/18/2022. There was mild edema and enhancement involving the superior endplate of T7 anteriorly.  A 5 mm area of enhancement is appreciated involving the superior aspect of the spinous process of T12 consistent with a metastatic lesion.  There is no evidence of disc herniation, canal stenosis or cord compression. No other areas of enhancement to suggest metastatic disease are identified.  IMPRESSION: 1.  There is a compression deformity involving T7 with mild edema and enhancement suggesting an acute to subacute fracture involving the superior endplate anteriorly. This is new as compared to the PET examination of 01/18/2022 but loss of vertical height appears similar to the PET examination of 04/11/2022. The appearance is nonspecific. This may represent a benign osteoporotic compression fracture. Underlying metastatic disease cannot be entirely excluded. 2.  There is a 5 mm metastatic lesion involving  the superior aspect of the spinous process of T12. 3.  No evidence of intradural enhancement, cord compression or cord signal abnormality. There is no evidence of disc herniation.   MRI EXAMINATION OF THE LUMBAR SPINE WITH AND WITHOUT CONTRAST:  The alignment of the lumbar spine is within normal limits. There is increased signal intensity involving the marrow of the lumbar spine which suggests radiation related changes. Signal loss throughout the L1 vertebral body is noted on the T1 sequence consistent with metastatic disease. There is a mild concave deformity involving the inferior endplate of L1 and to a lesser extent superior endplate of L1. A fracture plane is noted paralleling the inferior endplate. Edema tracks into the pedicles bilaterally. Signal loss and enhancement is appreciated involving the superior articulating facet of L2 to the left consistent with metastatic disease. A 5 mm metastatic lesion involving the spinous process of T12 superiorly is again noted. A large metastatic lesion is appreciated involving the sacral ala on the right. This measures approximately 5 cm in transverse dimension.  The conus is at T12-L1 and the caudal aspect of the spinal cord appears unremarkable.  T12-L1: There is no evidence of disc bulge or herniation.  L1-L2: A mild broad-based disc osteophyte complex is present which is more prominent to the right.  L2-L3: There is no evidence of disc bulge or herniation.  L3-L4: A mild broad-based disc bulge is present with no evidence of herniation.  L4-L5: Mild facet degenerative disease is present bilaterally. A mild central disc osteophyte complex is present with no evidence of herniation.  L5-S1: Moderate-to-severe facet degenerative disease is present bilaterally. Moderate foraminal stenosis is present on the right and there is moderate-to-severe foraminal stenosis on the left secondary to loss of disc height and extension of a disc osteophyte complex into the neural foramen.   IMPRESSION: 1.  Multifocal metastatic disease involving the lumbar spine and sacrum is noted as described above with the largest lesion involving the sacral ala to the right measuring approximately 5 cm in transverse dimension. Metastatic involvement of L1 is appreciated throughout the vertebral body and extending to the pedicles bilaterally. There is no evidence of epidural extension. Smaller metastatic foci are appreciated involving the superior articulating facet of L1 to the left and the superior aspect of the spinous process of T12. 2.  Loss of vertical height at L2 is appreciated estimated to be approximately 25% in severity. There is edema paralleling the superior endplate. The mild compression deformity at L2 is new versus the MRI examination of 10/01/2021.  The above information was called to and discussed with Dr. Jade on 08/04/2022 at 1240 hours.  This report was finalized on 8/5/2022 9:05 AM by Dr. Brenton Parham M.D.        CT of the head, MRI of the cervical lumbar and thoracic spine-images #2 reviewed interpreted by me in detail summarized above.    Assessment/Plan      *Bilateral breast cancer  · Invasive ductal carcinoma in both right and left breast and lesions measure under 1 cm.  No palpable lymphadenopathy although unsure if this has been evaluated by an axillary ultrasound.  No abnormal axillary lymphadenopathy noted on PET.  · Most likely clinical T1b N0 M0, both cancers were noted to be grade 2, ER/CT positive and HER-2 negative and Ki-67 less than 15%  VATS on 4/30/2021  Biopsy confirms adenocarcinoma of pulmonary primary  Discussed with Dr. Molina about delaying breast surgery and she is in agreement  Started on neoadjuvant anastrozole  · Patient experienced significant adverse effects with anastrozole  · Treatment changed to letrozole   · Axillary ultrasound 4/20/2021 -negative for any axillary lymphadenopathy  · Started letrozole in May 2021  · She continues on letrozole without any  problems.  · 8/24/2021 bilateral diagnostic mammogram and ultrasound.  In the left breast the tumor now measures 1 cm on mammogram as opposed to 0.8 cm in March.  Right breast calcifications also noted to be in a large extent although the number seems to be decreased.  Hematoma has decreased in size.  · 10/26/2021-bilateral diagnostic mammogram and ultrasound-decrease in size of the bilateral breast lesions.  · 10/26/2021-PET/CT-metastatic lesions in the left adrenal gland, L1-L2.  · 10/26/2021-MRI of the brain-no evidence of metastatic disease  · 3/23/2022-bilateral diagnostic mammogram and ultrasound show complete resolution of the tumors.  · No evidence of disease progression.    *Adenocarcinoma of the left lung, metastatic   · Initially diagnosed as T1 N2 M0, stage III  · MRI of the thoracic spine ordered to further evaluate the abnormality seen on PET  · MRI of the brain negative   · Given that she is only 55 and fairly good performance status I discussed with her by with concurrent chemoradiation with cisplatin and Alimta every 3 weeks followed by durvalumab.  · Adverse effects including but not limited to myelosuppression, increased risk of infections, nausea, vomiting, renal dysfunction, ototoxicity, neurotoxicity have been discussed at length.  · MRI of the thoracic spine performed 5/24/2021 shows no evidence of metastatic disease in the thoracic spine however in L1 there is a 7 mm lesion which is indeterminate.  A lumbar spine MRI has been recommended.  · Discussed her case with Dr. Oates and he feels like MRI of the lumbar spine may also show that this lesion is indeterminate.  · Discussed the same with the patient and her .  · Even if this is metastatic disease this might be the only lesion of metastatic disease and then would consider this oligometastatic disease.  · Therefore plan to proceed with concurrent chemoradiation as scheduled.  · cisplatin and Alimta, C1D1 5/25/2021  · Day 1 of  radiation 5/25/2021  · 5/27/2021-MRI of the lumbar spine-there is a 6 x 5 x 6 mm enhancing lesion in the left posterior body of L1 vertebra.  This is considered indeterminate.  Follow-up recommended.  Degenerative changes noted at L5-S1  · 7/7/2021, cycle 3 cisplatin Alimta  · 7/12/2021-radiation completed  · PET/CT 8/6/2021 with good response to chemoradiation.  · MRI of the lumbar spine 10/1/2021 with increase in size of the L1 lesion which now measures 14 mm as opposed to 6 mm in May 2021.  · 10/14/2021-biopsy of the L1 lesion and pathology consistent with adenocarcinoma of the lung, TPS 0  · 0/26/2021-PET/CT-metastatic lesions in the left adrenal gland, L1-L2.  · 10/26/2021-MRI of the brain-no evidence of metastatic disease  · Given that she only has metastatic disease in L1-L2 and left adrenal gland I think it would be possible to radiate all these regions.  · Completed radiation to L1-L2 on 11/19/2021  · 11/22/2021-cycle 1 of Keytruda and Alimta  · Completed radiation to the left adrenal gland  · 1/18/2022-PET/CT-images independently reviewed and interpreted by me.  Decrease in size of the 7 mm pulmonary nodule in the left upper lobe which previously measured 9 mm.  Radiation pneumonitis  nearly resolved.  Hypermetabolic activity at the L1-L2 metastasis has resolved.  No new suspicious metastatic disease.  · Guardant 360 circulating DNA level decreasing.  · 3/7/2022-patient is complaining of fluid retention.  · Thought to be secondary to Alimta and started on steroids and Lasix.  · Developed CHASITY on Lasix since Lasix discontinued.  · Alimta dose has been reduced.   · PET/CT 4/11/2022 concerning for disease progression in the left adrenal gland as well as L1 vertebra on the left.  · Case presented and discussed at the thoracic multidisciplinary conference.  The plan is to proceed with Keytruda and Alimta and radiation to the left adrenal gland and the L1 vertebra.  · She will be referred back to radiation  oncology, she had to reschedule her appointment due to COVID-19 infection in the family.    · Evaluated by radiation oncology on 6/1/2022 and plan is to proceed with L1 and adrenal radiation.  MRI of the lumbar spine performed 6/6/2022  · Read pending  · 6/7/22: GFR still low; hold Alimta but proceed with Keytruda   · 6/28/22:This does show concerning area at L1 as well as concerning enlarging left adrenal gland nodule.  This is consistent with prior imaging.  This MRI was obtained specifically to help radiation oncology decide if additional radiation is possible.   · Completed 5 treatments to the lumbar spine, 30 Armstrong.  Radiation completed on 7/14/2022  · PET/CT 7/18/2022-multiple areas of disease progression noted in the spine and in the right sacrum.  · Alimta has been on hold since 5/10/2022 due to renal dysfunction.  · She continued on Keytruda without any interruption.  · Although she was off Alimta for 2 months even prior to holding Alimta there was concern for disease progression on the PET/CT.  · Due to this reason we will discontinue Alimta and Keytruda and start Taxotere 75 mg per metered squared every 3 weeks.  · Guardant 360,  7/20/2022 without any actionable mutations  · 7/26/2022-cycle 1 Taxotere  · Altered mental status requiring hospitalization as well as febrile neutropenia between 8/2/2022 to 8/9/2022   · 8/16/2022-scheduled for cycle 2 of Taxotere.  · 8/23/2022-cycle 2-day 8 of Taxotere.  Reports dizziness.  1 L normal saline today  · CBC reviewed and relatively stable.  · Phosphorus extremely low.    *Neutropenia  · Received Udenyca on 8/16/2023  · WBC count normal  · Neutropenia resolved    *Back pain  · Secondary to the right sacral lesion  · Palliative radiation to the right sacral lesion performed and completed on 8/11/2022  · Excessive sedation with long-acting morphine  · Continue Percocet 10 mg every 4 hours as needed  · Pain reasonably well controlled    *Neuropathy in lower  extremities  · Unchanged      *Fluid retention  · Resolved now that she is off Alimta    *CHASITY  · Most likely secondary to Lasix  · Discontinue Lasix  · 1 L normal saline 3/28/2022  · Kidney function improved, Creatinine 1.69 and BUN 23.  Encourage patient to increase oral intake.  Discouraged intake of caffeinated beverages.  Patient to return in 1 week for labs only around the time of her schedule PET scan appointment.  · 4/19/2022-creatinine 1.36.  · 5/10/2022-creatinine elevated at 1.96.  · 5/31/2022 creatinine elevated at 2.01  · 6/7/2022-creatinine 1.76. Alimta held, continue Keytruda  · 6/28/2022 creatinine 1.5, GFR 40.  Patient will see nephrology for new patient consultation 7/7/2022.  Continue to hold Alimta for now.  · 8/2/2022-creatinine stable at 1.3  · 8/23/2022-creatinine mildly elevated.  1 L normal saline today    *Anemia  · Hemoglobin 9.1  · Mildly dizzy but I think that is mainly due to dehydration  · 1 L of normal saline today    *Bilateral breast cancer-no family history of breast cancer but given synchronous breast cancer genetic testing has been sent.  Genetic testing with a variant of unknown significance.    *Right hand numbness and difficulty with grasping objects  · Previously had history of carpal tunnel requiring repair.  · She is currently on letrozole which could cause worsening of the carpal tunnel  · Gabapentin dose will be increased to 600 mg 3 times daily  · She will also be referred to hand surgery for further evaluation.  · She now reports bilateral upper extremity numbness.  · Continue gabapentin  · MRI of the brain 10/26/2021 without any evidence of metastatic disease  · Not an issue today    *Anxiety-  · Xanax dose decreased to 0.5 mg  · Zyprexa has been discontinued  · She continues on Wellbutrin.  · She reports that the anxiety is poorly controlled  · Refer to supportive oncology    *Depression  · Zyprexa discontinued  · Continue Xanax and Wellbutrin  · Referral to supportive  oncology made today    *Left-sided chest wall pain-secondary to VATS.  Most likely neuropathic.  Continue gabapentin and Norco as needed.  She ran out of gabapentin and experienced leg cramps.    Continue gabapentin  Improved    *Smoking-not addressed today    *GERD  · 7/7/2021, patient complains of reflux despite the use of Prilosec 20 mg twice daily.  We will send her a prescription for Carafate slurry 4 times daily.  · She has not quite started using the Carafate.  · Continue Prilosec and Carafate  · PET/CT 4/11/2022 shows increased uptake in the proximal esophagus.  Likely secondary to GERD.  · Continue current medication    *Port not returning blood    · A new Mediport was placed by thoracic surgery 11/29/2021, Eliquis discontinued and right chest Mediport is now safe to use.    *Electrolyte abnormalities  · Potassium 3.4  · Administer 40 mEq of potassium for 3 days  · Magnesium low which is being replaced today  · Phosphorus also noted to be low-phosphorus replacement will be prescribed  · Hold Xgeva    *Skeletal metastasis  · Scheduled for Xgeva today  · We will hold due to electrolyte abnormalities and reschedule it on follow-up        PLAN:  1. 1 L normal saline today  2. Electrolyte replacement   3. Follow-up in 2 weeks for Taxotere and Xgeva  4. Follow-up in 3 weeks with myself      Salma Snell MD

## 2022-08-23 NOTE — PROGRESS NOTES
Subjective     No ref. provider found    Cancer Staging  cT1, cN2, cM1 non small cell lung cancer    Dear Doctors:    I am writing to let you know Holli Patel  has recently completed the radiation therapy portion of treatment for cancer stage IV lung cancer.  Her treatment course is summarized below:    Goal of treatment: Palliative    Site Treated:   1. Left adrenal gland retreat  2. L1 lumbar spine retreatment      Dates Of Treatment:  1. July 1, 2022- July 14, 2022  2. June 30, 2022- July 13, 2022     Total Dose and Treatment Technique:     1.  She received a total dose of 30Gy in 5 fractions to the left adrenal met delivered with sbrt over 13 elapsed days   2.  She received a total dose of 30Gy in 5 fractions to the L1 vertebral body delivered with sbrt over 13 elapsed days    Patient Tolerance and Response to Treatment:    She tolerated her  treatments well.  She had only the planned breaks in between fractions.  She had no pain or fatigue during the course of treatment.     Status of Tumor/Patient at Completion of Therapy:  stable    Disposition:  Follow up 4 weeks or sooner if necessary.

## 2022-08-23 NOTE — PROGRESS NOTES
"Outpatient Oncology Nutrition  Assessment/PES    Patient Name:  Holli Patel  YOB: 1965  MRN: [id number]    Assessment Date:  8/23/2022    Comments:  Referral for poor appetite and weight loss.   Met patient in infusion today, receiving IVF.   S/p hospital stay 8/3-8/9 for confusion related to polypharmacy and neutropenia.   Completed radiation 8/12 to cervical spine and sacrum.   The patient reports she has not been eating well due to poor taste but says it has actually improved in the last couple of days. Seems to be enjoying savory foods. Discussed other options that she may tolerate better and shared a list of other Umami foods.   Noted addition of Lexapro.   Also encouraged she consume snacks in between meals and provided Ensure complete for her to try.   Will follow up with patient and continue to encourage intake.      General Info     Row Name 08/23/22 6289       Today's Session    Person(s) attending today's session Patient;Spouse       General Information    Oncology patient? yes  lung ca with bone mets, breast cancer       Oncology Specific Assessment    Type of treatment Chemotherapy    Frequency of treatment Taxotere, Xgeva    Reported symptoms Anorexia;Weight loss;Taste changes                   Home Nutrition Report     Row Name 08/23/22 9139          Home Nutrition Report    Typical Intake (Food/Fluid/EN/PN) reports intake has improved slightly over the past couple of days                Estimated/Assessed Needs - Anthropometrics     Row Name 08/23/22 9213          Anthropometrics    Height 160 cm (62.99\")     Weight 71.2 kg (157 lb)     Weight for Calculation 71.2 kg (157 lb)     Additional Documentation Usual Body Weight (UBW) (Group)            Usual Body Weight (UBW)    Usual Body Weight 78 kg (172 lb)     Weight Change (Amount and Duration) 21 lb in last month            Estimated/Assessed Needs    Additional Documentation KCAL/KG (Group);Protein Requirements (Group);Fluid " Requirements (Group)            KCAL/KG    KCAL/KG 30 Kcal/Kg (kcal)     30 Kcal/Kg (kcal) 2136.45            Protein Requirements    Weight Used For Protein Calculations 71.2 kg (157 lb)     Est Protein Requirement Amount (gms/kg) 1.2 gm protein     Estimated Protein Requirements (gms/day) 85.46            Fluid Requirements    Fluid Requirements (mL/day) 2100     Estimated Fluid Requirement Method RDA Method     RDA Method (mL) 2100                Labs/Tests/Procedures/Meds     Row Name 08/23/22 1356          Labs/Procedures/Meds    Lab Results Reviewed reviewed            Diagnostic Tests/Procedures    Diagnostic Test/Procedure Reviewed reviewed            Medications    Pertinent Medications Reviewed reviewed     Pertinent Medications Comments xanax, wellbutrin, folvite, neurontin, femara, synthroid, prilosec, compazine                   Problem/Interventions:   Problem 1     Row Name 08/23/22 6099          Nutrition Diagnoses Problem 1    Problem 1 Unintended Weight Loss     Etiology (related to) Medical Diagnosis;Factors Affecting Nutrition     Oncology Breast cancer;Lung cancer;Cancer w/mets     Reported/Observed By Patient;Family     Appetite Poor     Oral Altered Taste     Signs/Symptoms (evidenced by) Unintended Weight Change     Unintended Weight Change Loss     Number of Pounds Lost 21     Weight loss time period 1 month                        Intervention Goal     Row Name 08/23/22 9585          Intervention Goal    General Provide information regarding MNT for treatment/condition;Meet nutritional needs for age/condition;Disease management/therapy;Reduce/improve symptoms     PO Tolerate PO;Increase intake;Meet estimated needs     Weight No significant weight loss                  Nutrition Prescription     Row Name 08/23/22 4668          Nutrition Prescription PO    PO Prescription Begin/change supplement     Supplement Ensure Complete     Supplement Frequency 2 times a day     Other Modifiers Small  feedings;High protein/high calorie                Education/Evaluation     Row Name 08/23/22 6456          Education    Education Provided education regarding;Education topics;Advised regarding habits/behavior     Education Topics Weight gain strategy     Advised Regarding Habits/Behavior Food choices;Weight gain strategy;Use supplement;Eating pattern;Increased nutrient density            Monitor/Evaluation    Monitor PO intake;Supplement intake;Pertinent labs;Weight;Food/activity diary     Education Follow-up Reinforce PRN                 Electronically signed by:  Marga Moscoso RD, ANASTASIA  08/23/22 13:57 EDT

## 2022-08-23 NOTE — NURSING NOTE
"No xgeva today due to labs per . Pts magnesium level today is 1.3 pt will receive 3g of magnesium with her IVF. Pt states she has been having diarrhea but not taking anything for it due to it not being \"that bad\". Education sheet on how to take Imodium provided to pt and her . Per  pt will start taking K Phos due to lab being low, waiting on dosing per pharmacy.     Lab Results   Component Value Date     08/23/2022    K 3.4 (L) 08/23/2022     08/23/2022    CO2 23.2 08/23/2022     Lab Results   Component Value Date    GLUCOSE 98 08/23/2022    BUN 7 08/23/2022    CREATININE 1.15 (H) 08/23/2022    EGFRIFNONA 46 (L) 02/14/2022    EGFRIFAFRI 95 11/24/2020    BCR 6.1 (L) 08/23/2022    K 3.4 (L) 08/23/2022    CO2 23.2 08/23/2022    CALCIUM 8.1 (L) 08/23/2022    PROTENTOTREF 6.7 08/02/2022    ALBUMIN 3.40 (L) 08/23/2022    LABIL2 0.8 08/02/2022    AST 10 08/23/2022    ALT 9 08/23/2022     Lab Results   Component Value Date    CALCIUM 8.1 (L) 08/23/2022    PHOS 1.2 (L) 08/23/2022       "

## 2022-08-30 ENCOUNTER — OFFICE VISIT (OUTPATIENT)
Dept: FAMILY MEDICINE CLINIC | Facility: CLINIC | Age: 57
End: 2022-08-30

## 2022-08-30 VITALS
SYSTOLIC BLOOD PRESSURE: 82 MMHG | BODY MASS INDEX: 27.71 KG/M2 | TEMPERATURE: 98 F | HEART RATE: 66 BPM | DIASTOLIC BLOOD PRESSURE: 58 MMHG | OXYGEN SATURATION: 99 % | WEIGHT: 156.4 LBS | HEIGHT: 63 IN

## 2022-08-30 DIAGNOSIS — Z86.69 HISTORY OF MIGRAINE: ICD-10-CM

## 2022-08-30 DIAGNOSIS — T45.1X5A CHEMOTHERAPY-INDUCED NEUTROPENIA: ICD-10-CM

## 2022-08-30 DIAGNOSIS — E83.39 HYPOPHOSPHATEMIA: ICD-10-CM

## 2022-08-30 DIAGNOSIS — E87.6 HYPOPOTASSEMIA: ICD-10-CM

## 2022-08-30 DIAGNOSIS — E83.42 HYPOMAGNESEMIA: ICD-10-CM

## 2022-08-30 DIAGNOSIS — C79.51 NON-SMALL CELL LUNG CANCER METASTATIC TO BONE: ICD-10-CM

## 2022-08-30 DIAGNOSIS — I10 ESSENTIAL HYPERTENSION: ICD-10-CM

## 2022-08-30 DIAGNOSIS — N18.31 STAGE 3A CHRONIC KIDNEY DISEASE: ICD-10-CM

## 2022-08-30 DIAGNOSIS — Z09 HOSPITAL DISCHARGE FOLLOW-UP: Primary | ICD-10-CM

## 2022-08-30 DIAGNOSIS — C34.90 NON-SMALL CELL LUNG CANCER METASTATIC TO BONE: ICD-10-CM

## 2022-08-30 DIAGNOSIS — R27.0 ATAXIA: ICD-10-CM

## 2022-08-30 DIAGNOSIS — F41.9 ANXIETY: ICD-10-CM

## 2022-08-30 DIAGNOSIS — D70.1 CHEMOTHERAPY-INDUCED NEUTROPENIA: ICD-10-CM

## 2022-08-30 PROCEDURE — 99215 OFFICE O/P EST HI 40 MIN: CPT | Performed by: FAMILY MEDICINE

## 2022-08-30 RX ORDER — ALPRAZOLAM 0.5 MG/1
0.5 TABLET ORAL NIGHTLY PRN
Qty: 60 TABLET | Refills: 2 | Status: ON HOLD
Start: 2022-09-18 | End: 2022-09-21

## 2022-08-30 RX ORDER — PROPRANOLOL HCL 60 MG
60 CAPSULE, EXTENDED RELEASE 24HR ORAL DAILY
Qty: 90 CAPSULE | Refills: 1 | Status: CANCELLED | OUTPATIENT
Start: 2022-08-30

## 2022-08-31 LAB
BASOPHILS # BLD AUTO: 0.1 X10E3/UL (ref 0–0.2)
BASOPHILS NFR BLD AUTO: 1 %
BUN SERPL-MCNC: 10 MG/DL (ref 6–24)
BUN/CREAT SERPL: 8 (ref 9–23)
CALCIUM SERPL-MCNC: 7.4 MG/DL (ref 8.7–10.2)
CHLORIDE SERPL-SCNC: 107 MMOL/L (ref 96–106)
CO2 SERPL-SCNC: 20 MMOL/L (ref 20–29)
CREAT SERPL-MCNC: 1.18 MG/DL (ref 0.57–1)
EGFRCR-CYS SERPLBLD CKD-EPI 2021: 53 ML/MIN/1.73
EOSINOPHIL # BLD AUTO: 0 X10E3/UL (ref 0–0.4)
EOSINOPHIL NFR BLD AUTO: 0 %
ERYTHROCYTE [DISTWIDTH] IN BLOOD BY AUTOMATED COUNT: 13.6 % (ref 11.7–15.4)
GLUCOSE SERPL-MCNC: 74 MG/DL (ref 65–99)
HCT VFR BLD AUTO: 31.2 % (ref 34–46.6)
HGB BLD-MCNC: 9.8 G/DL (ref 11.1–15.9)
IMM GRANULOCYTES # BLD AUTO: 0.2 X10E3/UL (ref 0–0.1)
IMM GRANULOCYTES NFR BLD AUTO: 1 %
LYMPHOCYTES # BLD AUTO: 0.9 X10E3/UL (ref 0.7–3.1)
LYMPHOCYTES NFR BLD AUTO: 5 %
MAGNESIUM SERPL-MCNC: 1.5 MG/DL (ref 1.6–2.3)
MCH RBC QN AUTO: 28.7 PG (ref 26.6–33)
MCHC RBC AUTO-ENTMCNC: 31.4 G/DL (ref 31.5–35.7)
MCV RBC AUTO: 92 FL (ref 79–97)
MONOCYTES # BLD AUTO: 1 X10E3/UL (ref 0.1–0.9)
MONOCYTES NFR BLD AUTO: 5 %
NEUTROPHILS # BLD AUTO: 17.2 X10E3/UL (ref 1.4–7)
NEUTROPHILS NFR BLD AUTO: 88 %
PHOSPHATE SERPL-MCNC: 2.5 MG/DL (ref 3–4.3)
PLATELET # BLD AUTO: 167 X10E3/UL (ref 150–450)
POTASSIUM SERPL-SCNC: 4.3 MMOL/L (ref 3.5–5.2)
RBC # BLD AUTO: 3.41 X10E6/UL (ref 3.77–5.28)
SODIUM SERPL-SCNC: 143 MMOL/L (ref 134–144)
WBC # BLD AUTO: 19.5 X10E3/UL (ref 3.4–10.8)

## 2022-09-06 ENCOUNTER — INFUSION (OUTPATIENT)
Dept: ONCOLOGY | Facility: HOSPITAL | Age: 57
End: 2022-09-06

## 2022-09-06 VITALS
BODY MASS INDEX: 28.87 KG/M2 | HEART RATE: 99 BPM | OXYGEN SATURATION: 94 % | TEMPERATURE: 98 F | DIASTOLIC BLOOD PRESSURE: 79 MMHG | WEIGHT: 163 LBS | RESPIRATION RATE: 16 BRPM | SYSTOLIC BLOOD PRESSURE: 134 MMHG

## 2022-09-06 DIAGNOSIS — C34.90 NON-SMALL CELL LUNG CANCER METASTATIC TO BONE: Primary | ICD-10-CM

## 2022-09-06 DIAGNOSIS — C79.51 NON-SMALL CELL LUNG CANCER METASTATIC TO BONE: Primary | ICD-10-CM

## 2022-09-06 LAB
ALBUMIN SERPL-MCNC: 3.1 G/DL (ref 3.5–5.2)
ALBUMIN/GLOB SERPL: 1 G/DL (ref 1.1–2.4)
ALP SERPL-CCNC: 169 U/L (ref 38–116)
ALT SERPL W P-5'-P-CCNC: 6 U/L (ref 0–33)
ANION GAP SERPL CALCULATED.3IONS-SCNC: 13.6 MMOL/L (ref 5–15)
AST SERPL-CCNC: 11 U/L (ref 0–32)
BASOPHILS # BLD AUTO: 0.03 10*3/MM3 (ref 0–0.2)
BASOPHILS NFR BLD AUTO: 0.2 % (ref 0–1.5)
BILIRUB SERPL-MCNC: <0.2 MG/DL (ref 0.2–1.2)
BUN SERPL-MCNC: 13 MG/DL (ref 6–20)
BUN/CREAT SERPL: 9.8 (ref 7.3–30)
CALCIUM SPEC-SCNC: 9 MG/DL (ref 8.5–10.2)
CHLORIDE SERPL-SCNC: 104 MMOL/L (ref 98–107)
CO2 SERPL-SCNC: 21.4 MMOL/L (ref 22–29)
CREAT SERPL-MCNC: 1.33 MG/DL (ref 0.6–1.1)
DEPRECATED RDW RBC AUTO: 56.1 FL (ref 37–54)
EGFRCR SERPLBLD CKD-EPI 2021: 46.2 ML/MIN/1.73
EOSINOPHIL # BLD AUTO: 0 10*3/MM3 (ref 0–0.4)
EOSINOPHIL NFR BLD AUTO: 0 % (ref 0.3–6.2)
ERYTHROCYTE [DISTWIDTH] IN BLOOD BY AUTOMATED COUNT: 16.2 % (ref 12.3–15.4)
GLOBULIN UR ELPH-MCNC: 3.2 GM/DL (ref 1.8–3.5)
GLUCOSE SERPL-MCNC: 145 MG/DL (ref 74–124)
HCT VFR BLD AUTO: 29.8 % (ref 34–46.6)
HGB BLD-MCNC: 9 G/DL (ref 12–15.9)
IMM GRANULOCYTES # BLD AUTO: 0.3 10*3/MM3 (ref 0–0.05)
IMM GRANULOCYTES NFR BLD AUTO: 1.5 % (ref 0–0.5)
LYMPHOCYTES # BLD AUTO: 0.43 10*3/MM3 (ref 0.7–3.1)
LYMPHOCYTES NFR BLD AUTO: 2.2 % (ref 19.6–45.3)
MAGNESIUM SERPL-MCNC: 1.4 MG/DL (ref 1.8–2.5)
MCH RBC QN AUTO: 29.3 PG (ref 26.6–33)
MCHC RBC AUTO-ENTMCNC: 30.2 G/DL (ref 31.5–35.7)
MCV RBC AUTO: 97.1 FL (ref 79–97)
MONOCYTES # BLD AUTO: 0.31 10*3/MM3 (ref 0.1–0.9)
MONOCYTES NFR BLD AUTO: 1.6 % (ref 5–12)
NEUTROPHILS NFR BLD AUTO: 18.48 10*3/MM3 (ref 1.7–7)
NEUTROPHILS NFR BLD AUTO: 94.5 % (ref 42.7–76)
NRBC BLD AUTO-RTO: 0 /100 WBC (ref 0–0.2)
PHOSPHATE SERPL-MCNC: 2.1 MG/DL (ref 2.5–4.5)
PLATELET # BLD AUTO: 248 10*3/MM3 (ref 140–450)
PMV BLD AUTO: 9.4 FL (ref 6–12)
POTASSIUM SERPL-SCNC: 4.2 MMOL/L (ref 3.5–4.7)
PROT SERPL-MCNC: 6.3 G/DL (ref 6.3–8)
RBC # BLD AUTO: 3.07 10*6/MM3 (ref 3.77–5.28)
SODIUM SERPL-SCNC: 139 MMOL/L (ref 134–145)
WBC NRBC COR # BLD: 19.55 10*3/MM3 (ref 3.4–10.8)

## 2022-09-06 PROCEDURE — 25010000002 DIPHENHYDRAMINE PER 50 MG: Performed by: INTERNAL MEDICINE

## 2022-09-06 PROCEDURE — 25010000002 MAGNESIUM SULFATE 2 GM/50ML SOLUTION: Performed by: INTERNAL MEDICINE

## 2022-09-06 PROCEDURE — 96367 TX/PROPH/DG ADDL SEQ IV INF: CPT

## 2022-09-06 PROCEDURE — 85025 COMPLETE CBC W/AUTO DIFF WBC: CPT

## 2022-09-06 PROCEDURE — 84100 ASSAY OF PHOSPHORUS: CPT

## 2022-09-06 PROCEDURE — 80053 COMPREHEN METABOLIC PANEL: CPT

## 2022-09-06 PROCEDURE — 96413 CHEMO IV INFUSION 1 HR: CPT

## 2022-09-06 PROCEDURE — 25010000002 DOCETAXEL 20 MG/ML SOLUTION 8 ML VIAL: Performed by: INTERNAL MEDICINE

## 2022-09-06 PROCEDURE — 96372 THER/PROPH/DIAG INJ SC/IM: CPT

## 2022-09-06 PROCEDURE — 25010000002 MAGNESIUM SULFATE IN D5W 1G/100ML (PREMIX) 1-5 GM/100ML-% SOLUTION: Performed by: INTERNAL MEDICINE

## 2022-09-06 PROCEDURE — 25010000002 DENOSUMAB 120 MG/1.7ML SOLUTION: Performed by: INTERNAL MEDICINE

## 2022-09-06 PROCEDURE — 83735 ASSAY OF MAGNESIUM: CPT

## 2022-09-06 PROCEDURE — 96375 TX/PRO/DX INJ NEW DRUG ADDON: CPT

## 2022-09-06 RX ORDER — CALCITRIOL 0.5 UG/1
1 CAPSULE, LIQUID FILLED ORAL DAILY
Qty: 60 CAPSULE | Refills: 0 | Status: ON HOLD | OUTPATIENT
Start: 2022-09-06 | End: 2022-10-06

## 2022-09-06 RX ORDER — DIPHENHYDRAMINE HYDROCHLORIDE 50 MG/ML
50 INJECTION INTRAMUSCULAR; INTRAVENOUS AS NEEDED
Status: CANCELLED | OUTPATIENT
Start: 2022-09-06

## 2022-09-06 RX ORDER — FAMOTIDINE 10 MG/ML
20 INJECTION, SOLUTION INTRAVENOUS AS NEEDED
Status: CANCELLED | OUTPATIENT
Start: 2022-09-06

## 2022-09-06 RX ORDER — SODIUM CHLORIDE 9 MG/ML
250 INJECTION, SOLUTION INTRAVENOUS ONCE
Status: COMPLETED | OUTPATIENT
Start: 2022-09-06 | End: 2022-09-06

## 2022-09-06 RX ORDER — MAGNESIUM SULFATE HEPTAHYDRATE 40 MG/ML
2 INJECTION, SOLUTION INTRAVENOUS ONCE
Status: COMPLETED | OUTPATIENT
Start: 2022-09-06 | End: 2022-09-06

## 2022-09-06 RX ORDER — BUPROPION HYDROCHLORIDE 150 MG/1
150 TABLET ORAL DAILY
Qty: 30 TABLET | Refills: 5 | Status: SHIPPED | OUTPATIENT
Start: 2022-09-06 | End: 2023-02-09

## 2022-09-06 RX ORDER — FAMOTIDINE 10 MG/ML
20 INJECTION, SOLUTION INTRAVENOUS ONCE
Status: COMPLETED | OUTPATIENT
Start: 2022-09-06 | End: 2022-09-06

## 2022-09-06 RX ORDER — MAGNESIUM SULFATE 1 G/100ML
1 INJECTION INTRAVENOUS ONCE
Status: COMPLETED | OUTPATIENT
Start: 2022-09-06 | End: 2022-09-06

## 2022-09-06 RX ORDER — SODIUM CHLORIDE 9 MG/ML
250 INJECTION, SOLUTION INTRAVENOUS ONCE
Status: CANCELLED | OUTPATIENT
Start: 2022-09-06

## 2022-09-06 RX ORDER — FAMOTIDINE 10 MG/ML
20 INJECTION, SOLUTION INTRAVENOUS ONCE
Status: CANCELLED | OUTPATIENT
Start: 2022-09-06

## 2022-09-06 RX ADMIN — SODIUM CHLORIDE 250 ML: 9 INJECTION, SOLUTION INTRAVENOUS at 14:44

## 2022-09-06 RX ADMIN — DIPHENHYDRAMINE HYDROCHLORIDE 25 MG: 50 INJECTION, SOLUTION INTRAMUSCULAR; INTRAVENOUS at 14:45

## 2022-09-06 RX ADMIN — DENOSUMAB 120 MG: 120 INJECTION SUBCUTANEOUS at 15:35

## 2022-09-06 RX ADMIN — MAGNESIUM SULFATE IN WATER 2 G: 40 INJECTION, SOLUTION INTRAVENOUS at 13:35

## 2022-09-06 RX ADMIN — MAGNESIUM SULFATE IN DEXTROSE 1 G: 10 INJECTION, SOLUTION INTRAVENOUS at 15:03

## 2022-09-06 RX ADMIN — DOCETAXEL 135 MG: 20 INJECTION, SOLUTION, CONCENTRATE INTRAVENOUS at 15:35

## 2022-09-06 RX ADMIN — FAMOTIDINE 20 MG: 10 INJECTION, SOLUTION INTRAVENOUS at 14:45

## 2022-09-06 NOTE — NURSING NOTE
Pt reports her pain medication makes her nauseated. She decided to break in half and take the other half 2 hours later. Updated , per MD she does not want to change her pain medication at this time wants pt to take her prn n/v medication when she wakes up, eat some food then take her pain medication. Informed the pt to not cut the medication in half if it were to make her nauseous again to call office. Pt vu.  She also reports she has stopped taking her Phos-NaK. K and Phos low today, per MD resume at home medication and to not stop it unless told by provider. Pt states the medication is expensive ($75) informed [facility] RN to see if this medication can be found cheaper, clinic RN working on this.    Replacing magnesium today with 3g of mag. Per Dr.Mandadi dangelo to receive xgeva today.

## 2022-09-06 NOTE — TELEPHONE ENCOUNTER
Caller: Holli Patel    Relationship: Self    Best call back number: [phone]    Requested Prescriptions:   Requested Prescriptions     Pending Prescriptions Disp Refills   • buPROPion XL (WELLBUTRIN XL) 150 MG 24 hr tablet 30 tablet 0     Sig: Take 1 tablet by mouth Daily.   • calcitriol (ROCALTROL) 0.5 MCG capsule 60 capsule 0     Sig: Take 2 capsules by mouth Daily.        Pharmacy where request should be sent: [address]  - [phone]  - [phone] FX     Additional details provided by patient: PATIENT WAS GIVEN THESE MEDICATIONS WENT SHE WENT TO THE HOSPITAL, PLEASE ADVISE. PATIENT HAS THREE DAYS LEFT.     Does the patient have less than a 3 day supply:  [x] Yes  [] No    Pepito Man Rep   09/06/22 08:46 EDT

## 2022-09-07 ENCOUNTER — INFUSION (OUTPATIENT)
Dept: ONCOLOGY | Facility: HOSPITAL | Age: 57
End: 2022-09-07

## 2022-09-07 DIAGNOSIS — C79.51 NON-SMALL CELL LUNG CANCER METASTATIC TO BONE: Primary | ICD-10-CM

## 2022-09-07 DIAGNOSIS — C34.90 NON-SMALL CELL LUNG CANCER METASTATIC TO BONE: Primary | ICD-10-CM

## 2022-09-07 PROCEDURE — 96372 THER/PROPH/DIAG INJ SC/IM: CPT

## 2022-09-07 PROCEDURE — 25010000002 PEGFILGRASTIM-CBQV 6 MG/0.6ML SOLUTION PREFILLED SYRINGE: Performed by: INTERNAL MEDICINE

## 2022-09-07 RX ADMIN — PEGFILGRASTIM-CBQV 6 MG: 6 INJECTION, SOLUTION SUBCUTANEOUS at 13:19

## 2022-09-12 DIAGNOSIS — C34.90 NON-SMALL CELL LUNG CANCER METASTATIC TO ADRENAL GLAND: Primary | ICD-10-CM

## 2022-09-12 DIAGNOSIS — C79.70 NON-SMALL CELL LUNG CANCER METASTATIC TO ADRENAL GLAND: Primary | ICD-10-CM

## 2022-09-12 RX ORDER — FENTANYL 25 UG/H
1 PATCH TRANSDERMAL
Qty: 10 PATCH | Refills: 0 | Status: SHIPPED | OUTPATIENT
Start: 2022-09-12 | End: 2022-09-12

## 2022-09-12 RX ORDER — OXYCODONE HYDROCHLORIDE 15 MG/1
15 TABLET ORAL EVERY 4 HOURS PRN
Qty: 90 TABLET | Refills: 0 | Status: SHIPPED | OUTPATIENT
Start: 2022-09-12 | End: 2022-09-23 | Stop reason: SDUPTHER

## 2022-09-12 RX ORDER — FENTANYL 25 UG/H
1 PATCH TRANSDERMAL
Qty: 10 PATCH | Refills: 0 | Status: SHIPPED | OUTPATIENT
Start: 2022-09-12 | End: 2022-09-23 | Stop reason: DRUGHIGH

## 2022-09-13 ENCOUNTER — CLINICAL SUPPORT (OUTPATIENT)
Dept: OTHER | Facility: HOSPITAL | Age: 57
End: 2022-09-13

## 2022-09-13 ENCOUNTER — OFFICE VISIT (OUTPATIENT)
Dept: ONCOLOGY | Facility: CLINIC | Age: 57
End: 2022-09-13

## 2022-09-13 ENCOUNTER — INFUSION (OUTPATIENT)
Dept: ONCOLOGY | Facility: HOSPITAL | Age: 57
End: 2022-09-13

## 2022-09-13 VITALS
HEART RATE: 88 BPM | BODY MASS INDEX: 27.96 KG/M2 | WEIGHT: 157.8 LBS | SYSTOLIC BLOOD PRESSURE: 134 MMHG | OXYGEN SATURATION: 96 % | DIASTOLIC BLOOD PRESSURE: 84 MMHG | TEMPERATURE: 96.8 F | RESPIRATION RATE: 16 BRPM | HEIGHT: 63 IN

## 2022-09-13 DIAGNOSIS — C79.51 NON-SMALL CELL LUNG CANCER METASTATIC TO BONE: ICD-10-CM

## 2022-09-13 DIAGNOSIS — C34.90 NON-SMALL CELL LUNG CANCER METASTATIC TO BONE: ICD-10-CM

## 2022-09-13 DIAGNOSIS — C79.51 NON-SMALL CELL LUNG CANCER METASTATIC TO BONE: Primary | ICD-10-CM

## 2022-09-13 DIAGNOSIS — Z45.2 ENCOUNTER FOR FITTING AND ADJUSTMENT OF VASCULAR CATHETER: ICD-10-CM

## 2022-09-13 DIAGNOSIS — C34.90 NON-SMALL CELL LUNG CANCER METASTATIC TO BONE: Primary | ICD-10-CM

## 2022-09-13 LAB
ALBUMIN SERPL-MCNC: 3.2 G/DL (ref 3.5–5.2)
ALBUMIN/GLOB SERPL: 1 G/DL (ref 1.1–2.4)
ALP SERPL-CCNC: 152 U/L (ref 38–116)
ALT SERPL W P-5'-P-CCNC: 10 U/L (ref 0–33)
ANION GAP SERPL CALCULATED.3IONS-SCNC: 12.1 MMOL/L (ref 5–15)
AST SERPL-CCNC: 12 U/L (ref 0–32)
BASOPHILS # BLD AUTO: 0.09 10*3/MM3 (ref 0–0.2)
BASOPHILS NFR BLD AUTO: 1.9 % (ref 0–1.5)
BILIRUB SERPL-MCNC: <0.2 MG/DL (ref 0.2–1.2)
BUN SERPL-MCNC: 8 MG/DL (ref 6–20)
BUN/CREAT SERPL: 7.3 (ref 7.3–30)
CALCIUM SPEC-SCNC: 7.8 MG/DL (ref 8.5–10.2)
CHLORIDE SERPL-SCNC: 106 MMOL/L (ref 98–107)
CO2 SERPL-SCNC: 20.9 MMOL/L (ref 22–29)
CREAT SERPL-MCNC: 1.1 MG/DL (ref 0.6–1.1)
DEPRECATED RDW RBC AUTO: 55.8 FL (ref 37–54)
EGFRCR SERPLBLD CKD-EPI 2021: 58 ML/MIN/1.73
EOSINOPHIL # BLD AUTO: 0.07 10*3/MM3 (ref 0–0.4)
EOSINOPHIL NFR BLD AUTO: 1.5 % (ref 0.3–6.2)
ERYTHROCYTE [DISTWIDTH] IN BLOOD BY AUTOMATED COUNT: 15.9 % (ref 12.3–15.4)
GLOBULIN UR ELPH-MCNC: 3.2 GM/DL (ref 1.8–3.5)
GLUCOSE SERPL-MCNC: 83 MG/DL (ref 74–124)
HCT VFR BLD AUTO: 28.9 % (ref 34–46.6)
HGB BLD-MCNC: 8.6 G/DL (ref 12–15.9)
IMM GRANULOCYTES # BLD AUTO: 0.05 10*3/MM3 (ref 0–0.05)
IMM GRANULOCYTES NFR BLD AUTO: 1.1 % (ref 0–0.5)
LYMPHOCYTES # BLD AUTO: 0.35 10*3/MM3 (ref 0.7–3.1)
LYMPHOCYTES NFR BLD AUTO: 7.5 % (ref 19.6–45.3)
MAGNESIUM SERPL-MCNC: 1.6 MG/DL (ref 1.8–2.5)
MCH RBC QN AUTO: 28.9 PG (ref 26.6–33)
MCHC RBC AUTO-ENTMCNC: 29.8 G/DL (ref 31.5–35.7)
MCV RBC AUTO: 97 FL (ref 79–97)
MONOCYTES # BLD AUTO: 0.78 10*3/MM3 (ref 0.1–0.9)
MONOCYTES NFR BLD AUTO: 16.6 % (ref 5–12)
NEUTROPHILS NFR BLD AUTO: 3.35 10*3/MM3 (ref 1.7–7)
NEUTROPHILS NFR BLD AUTO: 71.4 % (ref 42.7–76)
NRBC BLD AUTO-RTO: 0 /100 WBC (ref 0–0.2)
PLATELET # BLD AUTO: 166 10*3/MM3 (ref 140–450)
PMV BLD AUTO: 9.6 FL (ref 6–12)
POTASSIUM SERPL-SCNC: 4.3 MMOL/L (ref 3.5–4.7)
PROT SERPL-MCNC: 6.4 G/DL (ref 6.3–8)
RBC # BLD AUTO: 2.98 10*6/MM3 (ref 3.77–5.28)
SODIUM SERPL-SCNC: 139 MMOL/L (ref 134–145)
WBC NRBC COR # BLD: 4.69 10*3/MM3 (ref 3.4–10.8)

## 2022-09-13 PROCEDURE — 25010000002 HEPARIN LOCK FLUSH PER 10 UNITS: Performed by: INTERNAL MEDICINE

## 2022-09-13 PROCEDURE — 80053 COMPREHEN METABOLIC PANEL: CPT

## 2022-09-13 PROCEDURE — 25010000002 MAGNESIUM SULFATE 2 GM/50ML SOLUTION: Performed by: INTERNAL MEDICINE

## 2022-09-13 PROCEDURE — 96365 THER/PROPH/DIAG IV INF INIT: CPT

## 2022-09-13 PROCEDURE — 36415 COLL VENOUS BLD VENIPUNCTURE: CPT

## 2022-09-13 PROCEDURE — 99214 OFFICE O/P EST MOD 30 MIN: CPT | Performed by: INTERNAL MEDICINE

## 2022-09-13 PROCEDURE — 83735 ASSAY OF MAGNESIUM: CPT

## 2022-09-13 PROCEDURE — 85025 COMPLETE CBC W/AUTO DIFF WBC: CPT

## 2022-09-13 RX ORDER — MAGNESIUM SULFATE HEPTAHYDRATE 40 MG/ML
2 INJECTION, SOLUTION INTRAVENOUS ONCE
Status: COMPLETED | OUTPATIENT
Start: 2022-09-13 | End: 2022-09-13

## 2022-09-13 RX ORDER — SODIUM CHLORIDE 0.9 % (FLUSH) 0.9 %
10 SYRINGE (ML) INJECTION AS NEEDED
Status: CANCELLED | OUTPATIENT
Start: 2022-09-13

## 2022-09-13 RX ORDER — HEPARIN SODIUM (PORCINE) LOCK FLUSH IV SOLN 100 UNIT/ML 100 UNIT/ML
500 SOLUTION INTRAVENOUS AS NEEDED
Status: DISCONTINUED | OUTPATIENT
Start: 2022-09-13 | End: 2022-09-13 | Stop reason: HOSPADM

## 2022-09-13 RX ORDER — HEPARIN SODIUM (PORCINE) LOCK FLUSH IV SOLN 100 UNIT/ML 100 UNIT/ML
500 SOLUTION INTRAVENOUS AS NEEDED
Status: CANCELLED | OUTPATIENT
Start: 2022-09-13

## 2022-09-13 RX ORDER — SODIUM CHLORIDE 0.9 % (FLUSH) 0.9 %
10 SYRINGE (ML) INJECTION AS NEEDED
Status: DISCONTINUED | OUTPATIENT
Start: 2022-09-13 | End: 2022-09-13 | Stop reason: HOSPADM

## 2022-09-13 RX ORDER — SODIUM CHLORIDE 9 MG/ML
1000 INJECTION, SOLUTION INTRAVENOUS ONCE
Status: DISCONTINUED | OUTPATIENT
Start: 2022-09-13 | End: 2022-09-13 | Stop reason: HOSPADM

## 2022-09-13 RX ADMIN — MAGNESIUM SULFATE IN WATER 2 G: 40 INJECTION, SOLUTION INTRAVENOUS at 10:29

## 2022-09-13 RX ADMIN — Medication 10 ML: at 11:32

## 2022-09-13 RX ADMIN — Medication 500 UNITS: at 11:32

## 2022-09-13 NOTE — PROGRESS NOTES
Subjective   Holli Patel is a 57 y.o. female.  Referred by Dr. Molina for bilateral breast cancer    History of Present Illness   Ms. 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    3/1/2021-bilateral screening mammogram-microcalcifications seen in the posterior one third of the lateral aspect of the right breast.  Area of focal asymmetry seen in the middle one third of the upper inner quadrant of the left breast.    3/1/2021-DEXA scan-T score of -2.2 on the lumbar spine and T score of -2.3 in the left hip and T score of -1.9 in the right hip.  Findings consistent with osteopenia    3/23/2021-screening lung CT-there is a 10 x 11 mm solid nodule in the left upper lobe.  Enlarged AP window lymph node measuring 14 x 10 mm.  Suggestion of a possible 9 mm left hilar lymph node.  Heavy coronary artery calcification.    3/23/2021-diagnostic mammogram bilateral-cluster of microcalcifications in the middle third lateral aspect of the right breast.  Left breast demonstrates persistence of the area of focal asymmetry in the region.    Ultrasound-left breast ultrasound at 10 o'clock position, 6 cm from the nipple there is a 0.4 cm irregular hypoechoic lesion.  Stereotactic biopsy of the right breast calcifications recommended.  Ultrasound-guided biopsy of the left breast lesion recommended    4/7/2021-right breast stereotactic biopsy and left breast ultrasound-guided biopsy  Pathology  Right breast-invasive ductal carcinoma, grade 2, lymphovascular invasion present, ER +99% strong, NM +80% moderate, HER-2 negative, Ki-67 12%  Left breast upper inner quadrant 10 o'clock position biopsy, invasive ductal carcinoma, grade   2, ER +99% strong, NM +40% strong, HER-2 2+ on immunohistochemistry, nonamplified on FISH Ki-67 10%    4/14/2021-PET/CT-FDG avid aortopulmonary window lymph node measures 0.9 cm with an SUV of 7.5.  FDG avid left hilar  lymph node measures 1 cm with an SUV of 17.2.  Irregular soft tissue density in the right breast measuring 3.7 x 3.8 cm is favored to be secondary to the recent breast biopsy.  1.1 cm pulmonary nodule within the left upper lobe with SUV 9.7 .  Sub-6 mm pulmonary nodules are present in the right lower lobe.  No evidence of lymphadenopathy or metastatic disease in the abdomen.  Focal area of FDG uptake within the central canal posterior to T11-T12 displaced demonstrating an SUV of 5.5 thought to be reactive however MRI is recommended for further evaluation.    She was seen by Dr. Molina who initially planned for breast surgery however  due to the PET abnormalities she has been referred to Dr. Kerr who plans to do a wound on 4/30/2021.  Dr. Molina's and Dr. Kerr's notes reviewed.    Patient denies any family history of breast cancer.  Her mother had lymphoma.  Maternal grandmother had colon cancer.  Prior to that screening mammogram she did not have any palpable abnormalities of either breast.    She has been a heavy smoker for about 40 years and smoked 2 packs/day.  Denies any recent weight changes, new bone pains, cough, abdominal pain nausea vomiting constipation or diarrhea.  She does have anxiety and has been on several medications.  She has recently been started on Xanax to help with the mood and also with insomnia.    Patient had a video-assisted thoracoscopy and mediastinal lymphadenectomy on 4/30/2021.  L5-L6 lymph nodes were biopsied however the small pulmonary nodule in the left upper lobe was not difficult to find.  Pathology showed moderately differentiated adenocarcinoma which is CK7 and TTF-1 positive.  Consistent with lung primary.  Ki-67 85%.    5/14/2021-MRI of the brain-minimal chronic small vessel ischemic change in the white matter.  Otherwise negative MRI.    5/20/2021-bilateral axillary ultrasound-no evidence of axillary lymphadenopathy in either axilla.  Normal-appearing bilateral axillary  lymph nodes visualized.    Cycle 1 cisplatin and Alimta on 5/25/2021.    Cycle 2 cisplatin Alimta 6/15/2021    Cycle 3 cisplatin Alimta 7/7/2021    Completed radiation on 7/12/2021    Port was nonfunctional hence a port study was performed which showed that the port was backed up into the innominate vein and also there was a nonocclusive thrombus at the end of the catheter extending into the superior vena cava.  She was started on anticoagulation with Eliquis.  It was recommended for port revision of the intention to keep the port.    PET/CT 8/5/2021-images independently reviewed and interpreted by me-decrease in size and FDG uptake of the left upper lobe pulmonary nodule as well as mediastinal and left hilar lymphadenopathy representing response to treatment.  Sub-6 mm pulmonary nodule in the left upper lobe new since 4/14/2021.  This could be related to radiation however follow-up CT in 3 months recommended.  Decrease in size of the irregular masslike tissue in the right breast which is postbiopsy hematoma.  This is not PET avid.  New segmental left eighth rib fractures healing.  A new band of sclerosis of the left seventh rib which favored to represent healing nondisplaced fracture.  Follow-up CT recommended.  focal uptake in the duodenum first and second portions.  Could be related to duodenitis.  Indeterminate lesion in the L1 vertebral body not well evaluated on PET/CT.    10/1/2021-MRI of the lumbar spine.  Lesion in the left posterior body of L1 measures 14 x 14 x 12 mm and previously 5 x 5 x 6 mm.  The interval enlargement strongly suggest that it is metastatic lesion.  No additional osseous metastasis noted.   Other chronic changes noted.    10/14/2021-biopsy of the lumbar spine lesion-pathology consistent with poorly differentiated carcinoma.  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    10/26/2021-brain MRI-no evidence of  metastatic disease.    10/26/2021-bilateral diagnostic mammogram and ultrasound  Scattered fibroglandular density in both breast.  Postbiopsy hematoma with internal biopsy clip in the upper outer quadrant of the right breast, 8 cm from the nipple.  The hematoma size is decreased to 2.9 cm from 3.6 cm earlier.    Left breast-parenchymal density measuring 9 x 10 mm has markedly decreased.  Few adjacent calcifications which are unchanged.  No new abnormality in the left breast.  At 10:00 region there is some minimal adjacent hypoechoic texture which is difficult to measure but appears smaller since the previous exam.    10/28/2021-PET/CT  New L1 and L2 lytic bone metastasis annually intensely hypermetabolic focus at the left adrenal gland likely represents metastatic disease as well.  Slight decrease in size of the 0.9 x 0.7 cm left upper lobe pulmonary nodule.  There is hypermetabolic activity related to radiation pneumonitis.  The remainder of the nodule is photopenic.  Uncertain etiology of the more intense activity along the right lateral peripheral margin of the 2.6 cm postbiopsy density of the right breast.    She completed radiation to to the lumbar spine on 11/19/2021 11/22/2021-cycle 1 Alimta and Keytruda    3/23/2022-bilateral diagnostic mammogram and ultrasound  Complete resolution of the microcalcifications in the upper outer quadrant of the right breast and decrease in size of the postbiopsy hematoma.  No suspicious abnormalities in the right breast.  Benign-appearing calcifications at the site of malignancy in the left breast upper inner quadrant.  No other suspicious findings.    4/11/2022 PET/CT-there is new groundglass opacities in the left upper lobe which are most likely postradiation changes.  Other groundglass opacities in the left lung as well as the right lung remained stable.  Increased nodularity of the left adrenal gland with an SUV of 5.6, previously 3.5.    Increased uptake in the L1  vertebra in the right posterior aspect with an SUV of 3.6.  Left L1 sclerosis increased    Due to this the case was discussed in multidisciplinary conference.  The disease progression was significant in the left adrenal gland as well as the L1 vertebra.  Since there were only 2 areas of disease progression it has been decided to proceed with radiation to these 2 spots and continue on Keytruda and Alimta.    Patient also has had worsening of her kidney function.  We initially held treatment as of 5/31/2022 and creatinine bumped up to 2.0, BUN 23.  We then resumed therapy 6/11/2022 with Keytruda alone.  Patient was referred to nephrology.    6/6/2022-MRI of the spine-diffuse marrow replacement in the L1 vertebral body and inferior endplate concavity.  Suspicious for metastatic disease with pathological fracture in the inferior endplate.  Also diffuse signal abnormality in the L2 lamina on the left suspicious for additional metastatic disease.  Abnormality of the first sacral segment suspicious for metastatic disease.  Left adrenal gland appears enlarged.  30 to 40% height loss and L2 vertebral body suggestive of a subacute compression fracture.  Degenerative changes.    7/18/2022-PET/CT  Multiple hypermetabolic osseous lesions with index lesions which have either increased in degree of FDG uptake or newly hypermetabolic lesions representative of metastatic disease and progression of disease.  Possible FDG avid lesion in the left femoral diaphysis however these are incompletely visualized and in the area of artifactual FDG uptake.  MRI of the left femur recommended for further evaluation.  Increasing FDG uptake of the left adrenal gland.  Focal left hilar hypermetabolic him corresponds to the lymph node which has minimally increased in size compared to April 2022.  New sub-6 mm pulmonary nodules.    7/26/2022-cycle 1 of Taxotere    Patient was admitted to the hospital 8/3/2021 discharged on 8/9/2022.  She was admitted  for strokelike symptoms and confusion.  The confusion was thought to be secondary to polypharmacy and probably febrile neutropenia.  She was treated with antibiotics.  Mental status improved subsequently.  MRI of the cervical thoracic and lumbar spine was performed on 8/4/2022.  Images independently reviewed by me.  Multiple metastatic lesions in the spine noted.  There was a lesion on the CT which was concerning.  There is also a sacral Lesion measuring up to 5 cm in transverse dimension on the right.  Patient was symptomatic with pain on the right side of the sacrum.  Radiation oncology recommended radiation to the cervical spine as well as sacrum.  CT head without any obvious abnormalities.    Completed radiation to the cervical spine and sacrum on 8/12/2022.    Interval history:  Holli Patel is a 57 y.o. female with the above-mentioned history returns to the office today for scheduled follow-up.    She received Taxotere about a week ago on 9/6/2022.  Reports fatigue, increasing pain in the lumbar spine.  She also reports pain radiating to the right hip.  Her  thinks that she is wobbly at times when she is walking.  Denies any bladder or bowel incontinence.  Neuropathy has not changed significantly.  She was started on a fentanyl patch 25 mcg yesterday and also the breakthrough medications have been changed to oxycodone 15 mg every 4 hours as needed.  Does not report any improvement in pain control with these yet.    The following portions of the patient's history were reviewed and updated as appropriate: allergies, current medications, past family history, past medical history, past social history, past surgical history and problem list.    Past Medical History:   Diagnosis Date   • Anxiety    • Clot     POSSIBLE BLOOD CLOT IN VENOUS ACCESS DEVICE-PT STATES WAS STARTED ON ELIQUIS    • Dyspnea on exertion    • Elevated cholesterol    • Frequent urination     DAY AND NIGHT   • SHAYY (generalized anxiety  disorder)     RELATED HIGH BLOOD PRESSURE   • GERD (gastroesophageal reflux disease)    • High blood pressure     ANXIETY RELATED   • Hyperlipidemia    • Hypothyroidism    • Lung cancer (HCC)    • Lung nodule     LEFT   • Malignant neoplasm of upper-outer quadrant of right breast in female, estrogen receptor positive (HCC) 4/13/2021    PT STATES HAS BILAT BREAST CANCER   • Migraine    • PONV (postoperative nausea and vomiting)         Past Surgical History:   Procedure Laterality Date   • BREAST BIOPSY Bilateral 04/07/2021    Right Breast 10 o'clock & Left breast 10 o'clock 6 cm from nipple, [facility]   • CLOSED REDUCTION HIP DISLOCATION Left 4/30/2021    Procedure: BRONCHOSCOPY, LEFT VIDEO ASSITED THORACOSCOPY, ROBOTIC ASSSITED MEDIASTINAL LYMPHADENECTOMY WITH INTERCOSTAL NERVE BLOCKS;  Surgeon: Raphael Kerr III, MD;  Location: [facility] OR;  Service: DaVinci;  Laterality: Left;   • COLONOSCOPY     • TUBAL ABDOMINAL LIGATION     • VENOUS ACCESS DEVICE (PORT) INSERTION Right 5/20/2021    Procedure: INSERTION VENOUS ACCESS DEVICE;  Surgeon: Raphael Kerr III, MD;  Location: [facility] MAIN OR;  Service: Thoracic;  Laterality: Right;   • VENOUS ACCESS DEVICE (PORT) INSERTION Right 11/29/2021    Procedure: REVISION AND REPLACEMENT RIGHT SUBCLAVIAN VENOUS ACCESS PORT;  Surgeon: Raphael Kerr III, MD;  Location: [facility] MAIN OR;  Service: Thoracic;  Laterality: Right;   • WRIST SURGERY Right         Family History   Problem Relation Age of Onset   • Cancer Father         LYMPHATIC   • Prostate cancer Father    • Lymphoma Father    • Alcohol abuse Brother    • Colon cancer Maternal Grandmother    • Malig Hyperthermia Neg Hx         Social History     Socioeconomic History   • Marital status:      Spouse name: Jelani   Tobacco Use   • Smoking status: Former Smoker     Packs/day: 1.00     Years: 30.00     Pack years: 30.00     Types: Electronic Cigarette, Cigarettes     Start date: 4/14/1981     Quit date: 2021      "Years since quittin.6   • Smokeless tobacco: Never Used   • Tobacco comment: ABT 15 CIGARETTES DAILY   Vaping Use   • Vaping Use: Some days   • Substances: Nicotine, Flavoring   • Devices: Disposable   Substance and Sexual Activity   • Alcohol use: Never   • Drug use: Never   • Sexual activity: Yes     Partners: Male        OB History        2    Para   2    Term   2            AB        Living           SAB        IAB        Ectopic        Molar        Multiple        Live Births                Age of menarche-10  Age at menopause-42  Oral contraceptive pill use-15 years  No HRT use  She has 2 children  Age at first live childbirth-17    No Known Allergies     Review of Systems     Review of systems as mentioned in the HPI    Objective   Blood pressure 134/84, pulse 88, temperature 96.8 °F (36 °C), temperature source Temporal, resp. rate 16, height 160 cm (62.99\"), weight 71.6 kg (157 lb 12.8 oz), SpO2 96 %, not currently breastfeeding.       Physical Exam  Vitals reviewed.   HENT:      Head: Normocephalic.   Eyes:      Pupils: Pupils are equal, round, and reactive to light.   Cardiovascular:      Rate and Rhythm: Normal rate and regular rhythm.      Pulses: Normal pulses.      Heart sounds: Normal heart sounds.   Pulmonary:      Effort: Pulmonary effort is normal.      Breath sounds: Normal breath sounds.   Abdominal:      General: Abdomen is flat.   Musculoskeletal:         General: No swelling. Normal range of motion.      Cervical back: Normal range of motion.   Skin:     General: Skin is warm.   Neurological:      General: No focal deficit present.      Mental Status: She is alert and oriented to person, place, and time.   Psychiatric:         Mood and Affect: Mood normal.         Behavior: Behavior normal.       I have reexamined the patient and the results are consistent with the previously documented exam. Salma Snell MD       Results from last 7 days   Lab Units 22  0840 " 09/06/22  1236   WBC 10*3/mm3 4.69 19.55*   NEUTROS ABS 10*3/mm3 3.35 18.48*   HEMOGLOBIN g/dL 8.6* 9.0*   HEMATOCRIT % 28.9* 29.8*   PLATELETS 10*3/mm3 166 248     Results from last 7 days   Lab Units 09/06/22  1236   SODIUM mmol/L 139   POTASSIUM mmol/L 4.2   CHLORIDE mmol/L 104   CO2 mmol/L 21.4*   BUN mg/dL 13   CREATININE mg/dL 1.33*   CALCIUM mg/dL 9.0   ALBUMIN g/dL 3.10*   BILIRUBIN mg/dL <0.2*   ALK PHOS U/L 169*   ALT (SGPT) U/L 6   AST (SGOT) U/L 11   GLUCOSE mg/dL 145*   MAGNESIUM mg/dL 1.4*              No radiology results for the last 30 days.    CT of the head, MRI of the cervical lumbar and thoracic spine-images #2 reviewed interpreted by me in detail summarized above.    Assessment/Plan      *Bilateral breast cancer  · Invasive ductal carcinoma in both right and left breast and lesions measure under 1 cm.  No palpable lymphadenopathy although unsure if this has been evaluated by an axillary ultrasound.  No abnormal axillary lymphadenopathy noted on PET.  · Most likely clinical T1b N0 M0, both cancers were noted to be grade 2, ER/OK positive and HER-2 negative and Ki-67 less than 15%  VATS on 4/30/2021  Biopsy confirms adenocarcinoma of pulmonary primary  Discussed with Dr. Molina about delaying breast surgery and she is in agreement  Started on neoadjuvant anastrozole  · Patient experienced significant adverse effects with anastrozole  · Treatment changed to letrozole   · Axillary ultrasound 4/20/2021 -negative for any axillary lymphadenopathy  · Started letrozole in May 2021  · She continues on letrozole without any problems.  · 8/24/2021 bilateral diagnostic mammogram and ultrasound.  In the left breast the tumor now measures 1 cm on mammogram as opposed to 0.8 cm in March.  Right breast calcifications also noted to be in a large extent although the number seems to be decreased.  Hematoma has decreased in size.  · 10/26/2021-bilateral diagnostic mammogram and ultrasound-decrease in size of the  bilateral breast lesions.  · 10/26/2021-PET/CT-metastatic lesions in the left adrenal gland, L1-L2.  · 10/26/2021-MRI of the brain-no evidence of metastatic disease  · 3/23/2022-bilateral diagnostic mammogram and ultrasound show complete resolution of the tumors.  · No evidence of disease progression    *Adenocarcinoma of the left lung, metastatic   · Initially diagnosed as T1 N2 M0, stage III  · MRI of the thoracic spine ordered to further evaluate the abnormality seen on PET  · MRI of the brain negative   · Given that she is only 55 and fairly good performance status I discussed with her by with concurrent chemoradiation with cisplatin and Alimta every 3 weeks followed by durvalumab.  · Adverse effects including but not limited to myelosuppression, increased risk of infections, nausea, vomiting, renal dysfunction, ototoxicity, neurotoxicity have been discussed at length.  · MRI of the thoracic spine performed 5/24/2021 shows no evidence of metastatic disease in the thoracic spine however in L1 there is a 7 mm lesion which is indeterminate.  A lumbar spine MRI has been recommended.  · Discussed her case with Dr. Oates and he feels like MRI of the lumbar spine may also show that this lesion is indeterminate.  · Discussed the same with the patient and her .  · Even if this is metastatic disease this might be the only lesion of metastatic disease and then would consider this oligometastatic disease.  · Therefore plan to proceed with concurrent chemoradiation as scheduled.  · cisplatin and Alimta, C1D1 5/25/2021  · Day 1 of radiation 5/25/2021  · 5/27/2021-MRI of the lumbar spine-there is a 6 x 5 x 6 mm enhancing lesion in the left posterior body of L1 vertebra.  This is considered indeterminate.  Follow-up recommended.  Degenerative changes noted at L5-S1  · 7/7/2021, cycle 3 cisplatin Alimta  · 7/12/2021-radiation completed  · PET/CT 8/6/2021 with good response to chemoradiation.  · MRI of the lumbar spine  10/1/2021 with increase in size of the L1 lesion which now measures 14 mm as opposed to 6 mm in May 2021.  · 10/14/2021-biopsy of the L1 lesion and pathology consistent with adenocarcinoma of the lung, TPS 0  · 0/26/2021-PET/CT-metastatic lesions in the left adrenal gland, L1-L2.  · 10/26/2021-MRI of the brain-no evidence of metastatic disease  · Given that she only has metastatic disease in L1-L2 and left adrenal gland I think it would be possible to radiate all these regions.  · Completed radiation to L1-L2 on 11/19/2021  · 11/22/2021-cycle 1 of Keytruda and Alimta  · Completed radiation to the left adrenal gland  · 1/18/2022-PET/CT-images independently reviewed and interpreted by me.  Decrease in size of the 7 mm pulmonary nodule in the left upper lobe which previously measured 9 mm.  Radiation pneumonitis  nearly resolved.  Hypermetabolic activity at the L1-L2 metastasis has resolved.  No new suspicious metastatic disease.  · Guardant 360 circulating DNA level decreasing.  · 3/7/2022-patient is complaining of fluid retention.  · Thought to be secondary to Alimta and started on steroids and Lasix.  · Developed CHASITY on Lasix since Lasix discontinued.  · Alimta dose has been reduced.   · PET/CT 4/11/2022 concerning for disease progression in the left adrenal gland as well as L1 vertebra on the left.  · Case presented and discussed at the thoracic multidisciplinary conference.  The plan is to proceed with Keytruda and Alimta and radiation to the left adrenal gland and the L1 vertebra.  · She will be referred back to radiation oncology, she had to reschedule her appointment due to COVID-19 infection in the family.    · Evaluated by radiation oncology on 6/1/2022 and plan is to proceed with L1 and adrenal radiation.  MRI of the lumbar spine performed 6/6/2022  · Read pending  · 6/7/22: GFR still low; hold Alimta but proceed with Keytruda   · 6/28/22:This does show concerning area at L1 as well as concerning enlarging  left adrenal gland nodule.  This is consistent with prior imaging.  This MRI was obtained specifically to help radiation oncology decide if additional radiation is possible.   · Completed 5 treatments to the lumbar spine, 30 Armstrong.  Radiation completed on 7/14/2022  · PET/CT 7/18/2022-multiple areas of disease progression noted in the spine and in the right sacrum.  · Alimta has been on hold since 5/10/2022 due to renal dysfunction.  · She continued on Keytruda without any interruption.  · Although she was off Alimta for 2 months even prior to holding Alimta there was concern for disease progression on the PET/CT.  · Due to this reason we will discontinue Alimta and Keytruda and start Taxotere 75 mg per metered squared every 3 weeks.  · Guardant 360,  7/20/2022 without any actionable mutations  · 7/26/2022-cycle 1 Taxotere  · Altered mental status requiring hospitalization as well as febrile neutropenia between 8/2/2022 to 8/9/2022   · 9/6/2022-cycle 3 of Taxotere  · 9/13/2022-CBC reviewed and WBC normal, hemoglobin 8.6, platelets normal  · CMP reviewed and creatinine normal at 1.1    *Neutropenia  · Continue Udenyca  · WBC count normal    *Back pain  · Secondary to the right sacral lesion  · Palliative radiation to the right sacral lesion performed and completed on 8/11/2022  · Excessive sedation with long-acting morphine  · Pain very inadequately controlled.  · We change pain medications to fentanyl 25 mcg with started 9/12/2022  · Now on oxycodone 15 mg every 4 hours as needed for pain.  · If she continues to experience worsening pain we will proceed with repeat spinal imaging due to concerns for disease progression.    *Neuropathy in lower extremities  · Unchanged    *Fluid retention  · Resolved now that she is off Alimta    *CHASITY  · Most likely secondary to Lasix  · Discontinue Lasix  · 1 L normal saline 3/28/2022  · Kidney function improved, Creatinine 1.69 and BUN 23.  Encourage patient to increase oral intake.   Discouraged intake of caffeinated beverages.  Patient to return in 1 week for labs only around the time of her schedule PET scan appointment.  · 4/19/2022-creatinine 1.36.  · 5/10/2022-creatinine elevated at 1.96.  · 5/31/2022 creatinine elevated at 2.01  · 6/7/2022-creatinine 1.76. Alimta held, continue Keytruda  · 6/28/2022 creatinine 1.5, GFR 40.  Patient will see nephrology for new patient consultation 7/7/2022.  Continue to hold Alimta for now.  · 8/2/2022-creatinine stable at 1.3  · 9/13/2022-creatinine stable at 1.1    *Anemia  · Hemoglobin 8.6  · Reports feeling tired  · Will check hemoglobin in 2 weeks when she is due for Taxotere again  · May have to dose reduce Taxotere by 20%    *Bilateral breast cancer-no family history of breast cancer but given synchronous breast cancer genetic testing has been sent.  Genetic testing with a variant of unknown significance.    *Right hand numbness and difficulty with grasping objects  · Previously had history of carpal tunnel requiring repair.  · She is currently on letrozole which could cause worsening of the carpal tunnel  · Gabapentin dose will be increased to 600 mg 3 times daily  · She will also be referred to hand surgery for further evaluation.  · She now reports bilateral upper extremity numbness.  · Continue gabapentin  · MRI of the brain 10/26/2021 without any evidence of metastatic disease  · Not an issue today    *Anxiety-  · Xanax dose decreased to 0.5 mg  · Zyprexa has been discontinued  · She continues on Wellbutrin.  · Not adequately controlled  · Continue follow-up with supportive oncology    *Depression  · Zyprexa discontinued  · Continue Xanax and Wellbutrin  · Referral to supportive oncology made   · Follow-up with supportive oncology    *Left-sided chest wall pain-secondary to VATS.  Most likely neuropathic.  Continue gabapentin and Norco as needed.  She ran out of gabapentin and experienced leg cramps.    Continue  gabapentin  Improved    *Smoking-not addressed today    *GERD  · 7/7/2021, patient complains of reflux despite the use of Prilosec 20 mg twice daily.  We will send her a prescription for Carafate slurry 4 times daily.  · She has not quite started using the Carafate.  · Continue Prilosec and Carafate  · PET/CT 4/11/2022 shows increased uptake in the proximal esophagus.  Likely secondary to GERD.  · Continue current medications    *Port not returning blood    · A new Mediport was placed by thoracic surgery 11/29/2021, Eliquis discontinued and right chest Mediport is now safe to use.    *Electrolyte abnormalities  · Potassium normal today  · Magnesium level pending    *Skeletal metastasis  · Continue every 4 weekly Xgeva        PLAN:  1. Taxotere in 2 weeks  2. Scans following next cycle of Taxotere  3. Follow-up with me in 2 weeks  4. May have to decrease the dose of Taxotere by 20%      Salma Snell MD

## 2022-09-13 NOTE — PROGRESS NOTES
Outpatient Oncology Nutrition      Patient Name:  Holli Patel  YOB: 1965  MRN: [id number]    Assessment Date:  9/13/2022    Comments:  Follow up. Receiving IVF today. Patient was asleep but left a handout on increasing Mg in her diet.     Electronically signed by:  Marga Moscoso RD, LD  09/13/22 15:47 EDT

## 2022-09-15 ENCOUNTER — TELEPHONE (OUTPATIENT)
Dept: ONCOLOGY | Facility: CLINIC | Age: 57
End: 2022-09-15

## 2022-09-15 NOTE — TELEPHONE ENCOUNTER
Patient's  called stating Dr Snell and Brooklynn told him to call if the patient's Fentanyl 24 mcg patches were not controlling patient's pain and they would increase the patches to 2 to help control patient's pain. Patient's  states the 1 patch is not controlling patient's pain and the patch is due to be changed today 9/15/2022. Per Dr Snell patient to increase patches to 2 starting today 9/15/2022.  Patient's  v/u

## 2022-09-16 RX ORDER — FOLIC ACID 1 MG/1
TABLET ORAL
Qty: 30 TABLET | Refills: 3 | Status: SHIPPED | OUTPATIENT
Start: 2022-09-16 | End: 2023-01-24 | Stop reason: SDUPTHER

## 2022-09-19 ENCOUNTER — TELEPHONE (OUTPATIENT)
Dept: ONCOLOGY | Facility: CLINIC | Age: 57
End: 2022-09-19

## 2022-09-19 DIAGNOSIS — C34.12 PRIMARY ADENOCARCINOMA OF UPPER LOBE OF LEFT LUNG: ICD-10-CM

## 2022-09-19 DIAGNOSIS — C34.90 NON-SMALL CELL LUNG CANCER METASTATIC TO BONE: Primary | ICD-10-CM

## 2022-09-19 DIAGNOSIS — C79.51 NON-SMALL CELL LUNG CANCER METASTATIC TO BONE: Primary | ICD-10-CM

## 2022-09-19 RX ORDER — DEXAMETHASONE 4 MG/1
4 TABLET ORAL
Qty: 30 TABLET | Refills: 1 | Status: SHIPPED | OUTPATIENT
Start: 2022-09-19 | End: 2022-11-30

## 2022-09-19 NOTE — TELEPHONE ENCOUNTER
----- Message from Brooklynn Powell RN sent at 9/19/2022 12:10 PM EDT -----  PET ordered, please schedule, if possible, prior to follow up next Tuesday    Thank you!

## 2022-09-19 NOTE — TELEPHONE ENCOUNTER
Jelani called reporting Holli's ongoing pain.  They have increased her fentanyl patch to 50mcg.  Holli states she doesn't think it's helped at all.  She has 15mg oxy IR, she takes this every 4 hours and does not seem to relieve her pain.  I have reviewed this with Dr Snell, she recommends a daily steroid 4mg dexamethasone daily in the morning and will also order and schedule her PET scan.  Will try to get this accomplished before her appt in a week.  She and Jelani both v/u.

## 2022-09-21 DIAGNOSIS — F41.9 ANXIETY: ICD-10-CM

## 2022-09-21 NOTE — TELEPHONE ENCOUNTER
Rx Refill Note  Requested Prescriptions     Pending Prescriptions Disp Refills   • ALPRAZolam (XANAX) 0.5 MG tablet [Pharmacy Med Name: ALPRAZolam 0.5 MG TABLET] 60 tablet 1     Sig: TAKE ONE TABLET BY MOUTH TWICE A DAY AS NEEDED FOR ANXIETY      Last office visit with prescribing clinician: 8/30/2022      Next office visit with prescribing clinician: 12/2/2022            North Young MA  09/21/22, 13:44 EDT

## 2022-09-23 ENCOUNTER — HOSPITAL ENCOUNTER (INPATIENT)
Facility: HOSPITAL | Age: 57
LOS: 20 days | Discharge: HOME HEALTH | End: 2022-10-13
Attending: INTERNAL MEDICINE | Admitting: INTERNAL MEDICINE

## 2022-09-23 DIAGNOSIS — C34.12 PRIMARY ADENOCARCINOMA OF UPPER LOBE OF LEFT LUNG: Primary | ICD-10-CM

## 2022-09-23 DIAGNOSIS — C79.51 NON-SMALL CELL LUNG CANCER METASTATIC TO BONE: ICD-10-CM

## 2022-09-23 DIAGNOSIS — Z74.09 IMPAIRED MOBILITY AND ACTIVITIES OF DAILY LIVING: ICD-10-CM

## 2022-09-23 DIAGNOSIS — R27.0 ATAXIA: ICD-10-CM

## 2022-09-23 DIAGNOSIS — Z78.9 IMPAIRED MOBILITY AND ACTIVITIES OF DAILY LIVING: ICD-10-CM

## 2022-09-23 DIAGNOSIS — G89.3 CANCER RELATED PAIN: ICD-10-CM

## 2022-09-23 DIAGNOSIS — C34.90 NON-SMALL CELL LUNG CANCER METASTATIC TO ADRENAL GLAND: ICD-10-CM

## 2022-09-23 DIAGNOSIS — C79.70 NON-SMALL CELL LUNG CANCER METASTATIC TO ADRENAL GLAND: ICD-10-CM

## 2022-09-23 DIAGNOSIS — C34.90 NON-SMALL CELL LUNG CANCER METASTATIC TO BONE: ICD-10-CM

## 2022-09-23 PROCEDURE — 25010000002 DEXAMETHASONE PER 1 MG: Performed by: INTERNAL MEDICINE

## 2022-09-23 PROCEDURE — 25010000002 HYDROMORPHONE 1 MG/ML SOLUTION: Performed by: INTERNAL MEDICINE

## 2022-09-23 RX ORDER — FOLIC ACID 1 MG/1
1000 TABLET ORAL DAILY
Status: DISCONTINUED | OUTPATIENT
Start: 2022-09-24 | End: 2022-10-13 | Stop reason: HOSPADM

## 2022-09-23 RX ORDER — ALPRAZOLAM 0.5 MG/1
0.5 TABLET ORAL NIGHTLY PRN
Status: DISCONTINUED | OUTPATIENT
Start: 2022-09-23 | End: 2022-10-01

## 2022-09-23 RX ORDER — FENTANYL 50 UG/H
1 PATCH TRANSDERMAL
Qty: 10 PATCH | Refills: 0 | Status: SHIPPED | OUTPATIENT
Start: 2022-09-23 | End: 2022-10-23

## 2022-09-23 RX ORDER — PANTOPRAZOLE SODIUM 40 MG/1
40 TABLET, DELAYED RELEASE ORAL EVERY MORNING
Refills: 1 | Status: DISCONTINUED | OUTPATIENT
Start: 2022-09-24 | End: 2022-10-13 | Stop reason: HOSPADM

## 2022-09-23 RX ORDER — OXYCODONE HYDROCHLORIDE 15 MG/1
15 TABLET ORAL EVERY 4 HOURS PRN
Status: DISCONTINUED | OUTPATIENT
Start: 2022-09-23 | End: 2022-10-13 | Stop reason: HOSPADM

## 2022-09-23 RX ORDER — BUPROPION HYDROCHLORIDE 150 MG/1
150 TABLET ORAL DAILY
Status: DISCONTINUED | OUTPATIENT
Start: 2022-09-24 | End: 2022-10-13 | Stop reason: HOSPADM

## 2022-09-23 RX ORDER — ACETAMINOPHEN 650 MG/1
650 SUPPOSITORY RECTAL EVERY 4 HOURS PRN
Status: DISCONTINUED | OUTPATIENT
Start: 2022-09-23 | End: 2022-10-13 | Stop reason: HOSPADM

## 2022-09-23 RX ORDER — FENTANYL 50 UG/H
1 PATCH TRANSDERMAL
Qty: 10 PATCH | Refills: 0 | Status: SHIPPED | OUTPATIENT
Start: 2022-09-23 | End: 2022-09-23

## 2022-09-23 RX ORDER — ACETAMINOPHEN 160 MG/5ML
650 SOLUTION ORAL EVERY 4 HOURS PRN
Status: DISCONTINUED | OUTPATIENT
Start: 2022-09-23 | End: 2022-10-13 | Stop reason: HOSPADM

## 2022-09-23 RX ORDER — OXYCODONE HYDROCHLORIDE 15 MG/1
15 TABLET ORAL EVERY 4 HOURS PRN
Qty: 180 TABLET | Refills: 0 | Status: SHIPPED | OUTPATIENT
Start: 2022-09-23 | End: 2022-10-13 | Stop reason: HOSPADM

## 2022-09-23 RX ORDER — LETROZOLE 2.5 MG/1
2.5 TABLET, FILM COATED ORAL DAILY
Status: DISCONTINUED | OUTPATIENT
Start: 2022-09-24 | End: 2022-10-13 | Stop reason: HOSPADM

## 2022-09-23 RX ORDER — CALCITRIOL 0.25 UG/1
1 CAPSULE, LIQUID FILLED ORAL DAILY
Status: DISCONTINUED | OUTPATIENT
Start: 2022-09-24 | End: 2022-10-13 | Stop reason: HOSPADM

## 2022-09-23 RX ORDER — PROCHLORPERAZINE MALEATE 10 MG
10 TABLET ORAL EVERY 6 HOURS PRN
Status: DISCONTINUED | OUTPATIENT
Start: 2022-09-23 | End: 2022-10-13 | Stop reason: HOSPADM

## 2022-09-23 RX ORDER — GABAPENTIN 300 MG/1
300 CAPSULE ORAL 3 TIMES DAILY
Status: DISCONTINUED | OUTPATIENT
Start: 2022-09-23 | End: 2022-09-27

## 2022-09-23 RX ORDER — ONDANSETRON 2 MG/ML
4 INJECTION INTRAMUSCULAR; INTRAVENOUS EVERY 6 HOURS PRN
Status: DISCONTINUED | OUTPATIENT
Start: 2022-09-23 | End: 2022-10-13 | Stop reason: HOSPADM

## 2022-09-23 RX ORDER — FENTANYL 75 UG/H
1 PATCH TRANSDERMAL
Status: DISCONTINUED | OUTPATIENT
Start: 2022-09-23 | End: 2022-09-24

## 2022-09-23 RX ORDER — ACETAMINOPHEN 325 MG/1
650 TABLET ORAL EVERY 4 HOURS PRN
Status: DISCONTINUED | OUTPATIENT
Start: 2022-09-23 | End: 2022-10-13 | Stop reason: HOSPADM

## 2022-09-23 RX ORDER — DEXAMETHASONE SODIUM PHOSPHATE 4 MG/ML
4 INJECTION, SOLUTION INTRA-ARTICULAR; INTRALESIONAL; INTRAMUSCULAR; INTRAVENOUS; SOFT TISSUE 2 TIMES DAILY
Status: DISCONTINUED | OUTPATIENT
Start: 2022-09-23 | End: 2022-09-24

## 2022-09-23 RX ORDER — ATORVASTATIN CALCIUM 20 MG/1
10 TABLET, FILM COATED ORAL DAILY
Refills: 1 | Status: DISCONTINUED | OUTPATIENT
Start: 2022-09-24 | End: 2022-09-29

## 2022-09-23 RX ORDER — ESCITALOPRAM OXALATE 10 MG/1
10 TABLET ORAL DAILY
Status: DISCONTINUED | OUTPATIENT
Start: 2022-09-24 | End: 2022-09-30

## 2022-09-23 RX ORDER — LEVOTHYROXINE SODIUM 0.03 MG/1
25 TABLET ORAL
Status: DISCONTINUED | OUTPATIENT
Start: 2022-09-24 | End: 2022-10-13 | Stop reason: HOSPADM

## 2022-09-23 RX ADMIN — DEXAMETHASONE SODIUM PHOSPHATE 4 MG: 4 INJECTION, SOLUTION INTRAMUSCULAR; INTRAVENOUS at 22:05

## 2022-09-23 RX ADMIN — GABAPENTIN 300 MG: 300 CAPSULE ORAL at 22:05

## 2022-09-23 RX ADMIN — HYDROMORPHONE HYDROCHLORIDE 1 MG: 1 INJECTION, SOLUTION INTRAMUSCULAR; INTRAVENOUS; SUBCUTANEOUS at 22:05

## 2022-09-23 RX ADMIN — ALPRAZOLAM 0.5 MG: 0.5 TABLET ORAL at 22:05

## 2022-09-24 ENCOUNTER — APPOINTMENT (OUTPATIENT)
Dept: MRI IMAGING | Facility: HOSPITAL | Age: 57
End: 2022-09-24

## 2022-09-24 LAB
ANION GAP SERPL CALCULATED.3IONS-SCNC: 8.2 MMOL/L (ref 5–15)
BACTERIA UR QL AUTO: ABNORMAL /HPF
BASOPHILS # BLD AUTO: 0.06 10*3/MM3 (ref 0–0.2)
BASOPHILS NFR BLD AUTO: 0.3 % (ref 0–1.5)
BILIRUB UR QL STRIP: NEGATIVE
BUN SERPL-MCNC: 12 MG/DL (ref 6–20)
BUN/CREAT SERPL: 12 (ref 7–25)
CALCIUM SPEC-SCNC: 8.3 MG/DL (ref 8.6–10.5)
CHLORIDE SERPL-SCNC: 109 MMOL/L (ref 98–107)
CLARITY UR: CLEAR
CO2 SERPL-SCNC: 24.8 MMOL/L (ref 22–29)
COLOR UR: YELLOW
CREAT SERPL-MCNC: 1 MG/DL (ref 0.57–1)
DEPRECATED RDW RBC AUTO: 49.5 FL (ref 37–54)
EGFRCR SERPLBLD CKD-EPI 2021: 65 ML/MIN/1.73
EOSINOPHIL # BLD AUTO: 0 10*3/MM3 (ref 0–0.4)
EOSINOPHIL NFR BLD AUTO: 0 % (ref 0.3–6.2)
ERYTHROCYTE [DISTWIDTH] IN BLOOD BY AUTOMATED COUNT: 15.4 % (ref 12.3–15.4)
GLUCOSE SERPL-MCNC: 118 MG/DL (ref 65–99)
GLUCOSE UR STRIP-MCNC: ABNORMAL MG/DL
HCT VFR BLD AUTO: 28 % (ref 34–46.6)
HGB BLD-MCNC: 8.5 G/DL (ref 12–15.9)
HGB UR QL STRIP.AUTO: NEGATIVE
HYALINE CASTS UR QL AUTO: ABNORMAL /LPF
IMM GRANULOCYTES # BLD AUTO: 0.66 10*3/MM3 (ref 0–0.05)
IMM GRANULOCYTES NFR BLD AUTO: 3.5 % (ref 0–0.5)
KETONES UR QL STRIP: NEGATIVE
LEUKOCYTE ESTERASE UR QL STRIP.AUTO: ABNORMAL
LYMPHOCYTES # BLD AUTO: 0.38 10*3/MM3 (ref 0.7–3.1)
LYMPHOCYTES NFR BLD AUTO: 2 % (ref 19.6–45.3)
MCH RBC QN AUTO: 27.4 PG (ref 26.6–33)
MCHC RBC AUTO-ENTMCNC: 30.4 G/DL (ref 31.5–35.7)
MCV RBC AUTO: 90.3 FL (ref 79–97)
MONOCYTES # BLD AUTO: 0.22 10*3/MM3 (ref 0.1–0.9)
MONOCYTES NFR BLD AUTO: 1.2 % (ref 5–12)
NEUTROPHILS NFR BLD AUTO: 17.33 10*3/MM3 (ref 1.7–7)
NEUTROPHILS NFR BLD AUTO: 93 % (ref 42.7–76)
NITRITE UR QL STRIP: NEGATIVE
NRBC BLD AUTO-RTO: 0 /100 WBC (ref 0–0.2)
PH UR STRIP.AUTO: 7 [PH] (ref 5–8)
PLATELET # BLD AUTO: 219 10*3/MM3 (ref 140–450)
PMV BLD AUTO: 9.4 FL (ref 6–12)
POTASSIUM SERPL-SCNC: 4.6 MMOL/L (ref 3.5–5.2)
PROT UR QL STRIP: ABNORMAL
RBC # BLD AUTO: 3.1 10*6/MM3 (ref 3.77–5.28)
RBC # UR STRIP: ABNORMAL /HPF
REF LAB TEST METHOD: ABNORMAL
SODIUM SERPL-SCNC: 142 MMOL/L (ref 136–145)
SP GR UR STRIP: 1.02 (ref 1–1.03)
SQUAMOUS #/AREA URNS HPF: ABNORMAL /HPF
UROBILINOGEN UR QL STRIP: ABNORMAL
WBC # UR STRIP: ABNORMAL /HPF
WBC NRBC COR # BLD: 18.65 10*3/MM3 (ref 3.4–10.8)

## 2022-09-24 PROCEDURE — 99223 1ST HOSP IP/OBS HIGH 75: CPT | Performed by: INTERNAL MEDICINE

## 2022-09-24 PROCEDURE — 25010000002 LORAZEPAM PER 2 MG: Performed by: INTERNAL MEDICINE

## 2022-09-24 PROCEDURE — 87086 URINE CULTURE/COLONY COUNT: CPT | Performed by: INTERNAL MEDICINE

## 2022-09-24 PROCEDURE — 0 GADOBENATE DIMEGLUMINE 529 MG/ML SOLUTION: Performed by: INTERNAL MEDICINE

## 2022-09-24 PROCEDURE — 25010000002 HYDROMORPHONE 1 MG/ML SOLUTION: Performed by: INTERNAL MEDICINE

## 2022-09-24 PROCEDURE — 85025 COMPLETE CBC W/AUTO DIFF WBC: CPT | Performed by: INTERNAL MEDICINE

## 2022-09-24 PROCEDURE — A9577 INJ MULTIHANCE: HCPCS | Performed by: INTERNAL MEDICINE

## 2022-09-24 PROCEDURE — 87040 BLOOD CULTURE FOR BACTERIA: CPT | Performed by: INTERNAL MEDICINE

## 2022-09-24 PROCEDURE — 80048 BASIC METABOLIC PNL TOTAL CA: CPT | Performed by: INTERNAL MEDICINE

## 2022-09-24 PROCEDURE — 72197 MRI PELVIS W/O & W/DYE: CPT

## 2022-09-24 PROCEDURE — 25010000002 DEXAMETHASONE PER 1 MG: Performed by: INTERNAL MEDICINE

## 2022-09-24 PROCEDURE — 81001 URINALYSIS AUTO W/SCOPE: CPT | Performed by: INTERNAL MEDICINE

## 2022-09-24 PROCEDURE — 25010000002 ENOXAPARIN PER 10 MG: Performed by: INTERNAL MEDICINE

## 2022-09-24 PROCEDURE — 72158 MRI LUMBAR SPINE W/O & W/DYE: CPT

## 2022-09-24 RX ORDER — ENOXAPARIN SODIUM 100 MG/ML
40 INJECTION SUBCUTANEOUS EVERY 24 HOURS
Status: DISCONTINUED | OUTPATIENT
Start: 2022-09-24 | End: 2022-10-13 | Stop reason: HOSPADM

## 2022-09-24 RX ORDER — METAXALONE 800 MG/1
400 TABLET ORAL EVERY 6 HOURS SCHEDULED
Status: DISCONTINUED | OUTPATIENT
Start: 2022-09-24 | End: 2022-09-27

## 2022-09-24 RX ORDER — LORAZEPAM 2 MG/ML
1 INJECTION INTRAMUSCULAR ONCE
Status: COMPLETED | OUTPATIENT
Start: 2022-09-24 | End: 2022-09-24

## 2022-09-24 RX ORDER — DEXAMETHASONE SODIUM PHOSPHATE 4 MG/ML
4 INJECTION, SOLUTION INTRA-ARTICULAR; INTRALESIONAL; INTRAMUSCULAR; INTRAVENOUS; SOFT TISSUE EVERY 6 HOURS
Status: DISCONTINUED | OUTPATIENT
Start: 2022-09-24 | End: 2022-09-27

## 2022-09-24 RX ORDER — POLYETHYLENE GLYCOL 3350 17 G/17G
17 POWDER, FOR SOLUTION ORAL DAILY PRN
Status: DISCONTINUED | OUTPATIENT
Start: 2022-09-24 | End: 2022-09-26

## 2022-09-24 RX ORDER — AMOXICILLIN 250 MG
2 CAPSULE ORAL 2 TIMES DAILY
Status: DISCONTINUED | OUTPATIENT
Start: 2022-09-24 | End: 2022-09-26

## 2022-09-24 RX ORDER — FENTANYL 75 UG/H
1 PATCH TRANSDERMAL
Status: DISCONTINUED | OUTPATIENT
Start: 2022-09-24 | End: 2022-09-25

## 2022-09-24 RX ADMIN — OXYCODONE HYDROCHLORIDE 15 MG: 15 TABLET ORAL at 11:45

## 2022-09-24 RX ADMIN — HYDROMORPHONE HYDROCHLORIDE 1 MG: 1 INJECTION, SOLUTION INTRAMUSCULAR; INTRAVENOUS; SUBCUTANEOUS at 08:33

## 2022-09-24 RX ADMIN — METAXALONE 400 MG: 800 TABLET ORAL at 16:27

## 2022-09-24 RX ADMIN — HYDROMORPHONE HYDROCHLORIDE 1 MG: 1 INJECTION, SOLUTION INTRAMUSCULAR; INTRAVENOUS; SUBCUTANEOUS at 13:04

## 2022-09-24 RX ADMIN — ENOXAPARIN SODIUM 40 MG: 100 INJECTION SUBCUTANEOUS at 15:51

## 2022-09-24 RX ADMIN — Medication 2 PACKET: at 08:33

## 2022-09-24 RX ADMIN — GABAPENTIN 300 MG: 300 CAPSULE ORAL at 08:32

## 2022-09-24 RX ADMIN — BUPROPION HYDROCHLORIDE 150 MG: 150 TABLET, EXTENDED RELEASE ORAL at 08:33

## 2022-09-24 RX ADMIN — DEXAMETHASONE SODIUM PHOSPHATE 4 MG: 4 INJECTION, SOLUTION INTRAMUSCULAR; INTRAVENOUS at 08:32

## 2022-09-24 RX ADMIN — ESCITALOPRAM 10 MG: 10 TABLET, FILM COATED ORAL at 08:33

## 2022-09-24 RX ADMIN — Medication 1000 MCG: at 08:33

## 2022-09-24 RX ADMIN — DOCUSATE SODIUM 50MG AND SENNOSIDES 8.6MG 2 TABLET: 8.6; 5 TABLET, FILM COATED ORAL at 21:10

## 2022-09-24 RX ADMIN — LETROZOLE 2.5 MG: 2.5 TABLET, FILM COATED ORAL at 08:33

## 2022-09-24 RX ADMIN — HYDROMORPHONE HYDROCHLORIDE 1 MG: 1 INJECTION, SOLUTION INTRAMUSCULAR; INTRAVENOUS; SUBCUTANEOUS at 01:04

## 2022-09-24 RX ADMIN — FENTANYL 1 PATCH: 75 PATCH TRANSDERMAL at 01:06

## 2022-09-24 RX ADMIN — Medication 2 PACKET: at 18:34

## 2022-09-24 RX ADMIN — FENTANYL 1 PATCH: 75 PATCH TRANSDERMAL at 22:36

## 2022-09-24 RX ADMIN — OXYCODONE HYDROCHLORIDE 15 MG: 15 TABLET ORAL at 05:53

## 2022-09-24 RX ADMIN — HYDROMORPHONE HYDROCHLORIDE 1 MG: 1 INJECTION, SOLUTION INTRAMUSCULAR; INTRAVENOUS; SUBCUTANEOUS at 16:28

## 2022-09-24 RX ADMIN — GABAPENTIN 300 MG: 300 CAPSULE ORAL at 15:51

## 2022-09-24 RX ADMIN — DEXAMETHASONE SODIUM PHOSPHATE 4 MG: 4 INJECTION, SOLUTION INTRAMUSCULAR; INTRAVENOUS at 21:05

## 2022-09-24 RX ADMIN — OXYCODONE HYDROCHLORIDE 15 MG: 15 TABLET ORAL at 22:36

## 2022-09-24 RX ADMIN — OXYCODONE HYDROCHLORIDE 15 MG: 15 TABLET ORAL at 15:51

## 2022-09-24 RX ADMIN — PANTOPRAZOLE SODIUM 40 MG: 40 TABLET, DELAYED RELEASE ORAL at 06:06

## 2022-09-24 RX ADMIN — GADOBENATE DIMEGLUMINE 15 ML: 529 INJECTION, SOLUTION INTRAVENOUS at 17:27

## 2022-09-24 RX ADMIN — HYDROMORPHONE HYDROCHLORIDE 1 MG: 1 INJECTION, SOLUTION INTRAMUSCULAR; INTRAVENOUS; SUBCUTANEOUS at 03:35

## 2022-09-24 RX ADMIN — DEXAMETHASONE SODIUM PHOSPHATE 4 MG: 4 INJECTION, SOLUTION INTRAMUSCULAR; INTRAVENOUS at 15:51

## 2022-09-24 RX ADMIN — ALPRAZOLAM 0.5 MG: 0.5 TABLET ORAL at 22:36

## 2022-09-24 RX ADMIN — LORAZEPAM 1 MG: 2 INJECTION INTRAMUSCULAR; INTRAVENOUS at 16:28

## 2022-09-24 RX ADMIN — ATORVASTATIN CALCIUM 10 MG: 20 TABLET, FILM COATED ORAL at 08:33

## 2022-09-24 RX ADMIN — POLYETHYLENE GLYCOL 3350 17 G: 17 POWDER, FOR SOLUTION ORAL at 15:51

## 2022-09-24 RX ADMIN — CALCITRIOL 1 MCG: 0.25 CAPSULE ORAL at 08:33

## 2022-09-24 RX ADMIN — GABAPENTIN 300 MG: 300 CAPSULE ORAL at 21:10

## 2022-09-25 LAB
ALBUMIN SERPL-MCNC: 3.4 G/DL (ref 3.5–5.2)
ALBUMIN/GLOB SERPL: 1.2 G/DL
ALP SERPL-CCNC: 169 U/L (ref 39–117)
ALT SERPL W P-5'-P-CCNC: 10 U/L (ref 1–33)
ANION GAP SERPL CALCULATED.3IONS-SCNC: 9.9 MMOL/L (ref 5–15)
ANISOCYTOSIS BLD QL: ABNORMAL
AST SERPL-CCNC: 10 U/L (ref 1–32)
BILIRUB SERPL-MCNC: <0.2 MG/DL (ref 0–1.2)
BUN SERPL-MCNC: 12 MG/DL (ref 6–20)
BUN/CREAT SERPL: 12.1 (ref 7–25)
CALCIUM SPEC-SCNC: 8.1 MG/DL (ref 8.6–10.5)
CHLORIDE SERPL-SCNC: 107 MMOL/L (ref 98–107)
CO2 SERPL-SCNC: 24.1 MMOL/L (ref 22–29)
CREAT SERPL-MCNC: 0.99 MG/DL (ref 0.57–1)
DEPRECATED RDW RBC AUTO: 49.9 FL (ref 37–54)
EGFRCR SERPLBLD CKD-EPI 2021: 65.8 ML/MIN/1.73
ERYTHROCYTE [DISTWIDTH] IN BLOOD BY AUTOMATED COUNT: 15.4 % (ref 12.3–15.4)
GLOBULIN UR ELPH-MCNC: 2.8 GM/DL
GLUCOSE SERPL-MCNC: 127 MG/DL (ref 65–99)
HCT VFR BLD AUTO: 28.9 % (ref 34–46.6)
HGB BLD-MCNC: 8.9 G/DL (ref 12–15.9)
HYPOCHROMIA BLD QL: ABNORMAL
LYMPHOCYTES # BLD MANUAL: 0.4 10*3/MM3 (ref 0.7–3.1)
LYMPHOCYTES NFR BLD MANUAL: 0.8 % (ref 5–12)
MCH RBC QN AUTO: 27.9 PG (ref 26.6–33)
MCHC RBC AUTO-ENTMCNC: 30.8 G/DL (ref 31.5–35.7)
MCV RBC AUTO: 90.6 FL (ref 79–97)
METAMYELOCYTES NFR BLD MANUAL: 0.8 % (ref 0–0)
MONOCYTES # BLD: 0.16 10*3/MM3 (ref 0.1–0.9)
MYELOCYTES NFR BLD MANUAL: 0.4 % (ref 0–0)
NEUTROPHILS # BLD AUTO: 19.19 10*3/MM3 (ref 1.7–7)
NEUTROPHILS NFR BLD MANUAL: 96 % (ref 42.7–76)
NRBC BLD AUTO-RTO: 0.1 /100 WBC (ref 0–0.2)
PLAT MORPH BLD: NORMAL
PLATELET # BLD AUTO: 231 10*3/MM3 (ref 140–450)
PMV BLD AUTO: 8.8 FL (ref 6–12)
POTASSIUM SERPL-SCNC: 4.5 MMOL/L (ref 3.5–5.2)
PROT SERPL-MCNC: 6.2 G/DL (ref 6–8.5)
RBC # BLD AUTO: 3.19 10*6/MM3 (ref 3.77–5.28)
SODIUM SERPL-SCNC: 141 MMOL/L (ref 136–145)
STOMATOCYTES BLD QL SMEAR: ABNORMAL
VARIANT LYMPHS NFR BLD MANUAL: 2 % (ref 19.6–45.3)
WBC MORPH BLD: NORMAL
WBC NRBC COR # BLD: 19.99 10*3/MM3 (ref 3.4–10.8)

## 2022-09-25 PROCEDURE — 99232 SBSQ HOSP IP/OBS MODERATE 35: CPT | Performed by: INTERNAL MEDICINE

## 2022-09-25 PROCEDURE — 85007 BL SMEAR W/DIFF WBC COUNT: CPT | Performed by: INTERNAL MEDICINE

## 2022-09-25 PROCEDURE — 80053 COMPREHEN METABOLIC PANEL: CPT | Performed by: INTERNAL MEDICINE

## 2022-09-25 PROCEDURE — 85025 COMPLETE CBC W/AUTO DIFF WBC: CPT | Performed by: INTERNAL MEDICINE

## 2022-09-25 PROCEDURE — 25010000002 DEXAMETHASONE PER 1 MG: Performed by: INTERNAL MEDICINE

## 2022-09-25 PROCEDURE — 25010000002 ENOXAPARIN PER 10 MG: Performed by: INTERNAL MEDICINE

## 2022-09-25 PROCEDURE — 25010000002 HYDROMORPHONE 1 MG/ML SOLUTION: Performed by: INTERNAL MEDICINE

## 2022-09-25 RX ORDER — ALPRAZOLAM 0.5 MG/1
TABLET ORAL
Qty: 60 TABLET | Refills: 1 | Status: SHIPPED | OUTPATIENT
Start: 2022-09-25 | End: 2022-11-22 | Stop reason: SDUPTHER

## 2022-09-25 RX ORDER — SODIUM CHLORIDE 0.9 % (FLUSH) 0.9 %
20 SYRINGE (ML) INJECTION AS NEEDED
Status: DISCONTINUED | OUTPATIENT
Start: 2022-09-25 | End: 2022-10-13

## 2022-09-25 RX ORDER — FENTANYL 50 UG/H
1 PATCH TRANSDERMAL
Status: DISCONTINUED | OUTPATIENT
Start: 2022-09-25 | End: 2022-09-30

## 2022-09-25 RX ORDER — HEPARIN SODIUM (PORCINE) LOCK FLUSH IV SOLN 100 UNIT/ML 100 UNIT/ML
5 SOLUTION INTRAVENOUS AS NEEDED
Status: DISCONTINUED | OUTPATIENT
Start: 2022-09-25 | End: 2022-10-13

## 2022-09-25 RX ORDER — SODIUM CHLORIDE 0.9 % (FLUSH) 0.9 %
10 SYRINGE (ML) INJECTION AS NEEDED
Status: DISCONTINUED | OUTPATIENT
Start: 2022-09-25 | End: 2022-10-13

## 2022-09-25 RX ORDER — SODIUM CHLORIDE 0.9 % (FLUSH) 0.9 %
10 SYRINGE (ML) INJECTION EVERY 12 HOURS SCHEDULED
Status: DISCONTINUED | OUTPATIENT
Start: 2022-09-25 | End: 2022-10-13

## 2022-09-25 RX ADMIN — Medication 2 PACKET: at 17:32

## 2022-09-25 RX ADMIN — GABAPENTIN 300 MG: 300 CAPSULE ORAL at 09:10

## 2022-09-25 RX ADMIN — CALCITRIOL 1 MCG: 0.25 CAPSULE ORAL at 09:11

## 2022-09-25 RX ADMIN — Medication 10 ML: at 14:01

## 2022-09-25 RX ADMIN — DEXAMETHASONE SODIUM PHOSPHATE 4 MG: 4 INJECTION, SOLUTION INTRAMUSCULAR; INTRAVENOUS at 02:49

## 2022-09-25 RX ADMIN — OXYCODONE HYDROCHLORIDE 15 MG: 15 TABLET ORAL at 05:22

## 2022-09-25 RX ADMIN — BUPROPION HYDROCHLORIDE 150 MG: 150 TABLET, EXTENDED RELEASE ORAL at 09:11

## 2022-09-25 RX ADMIN — GABAPENTIN 300 MG: 300 CAPSULE ORAL at 17:32

## 2022-09-25 RX ADMIN — DEXAMETHASONE SODIUM PHOSPHATE 4 MG: 4 INJECTION, SOLUTION INTRAMUSCULAR; INTRAVENOUS at 09:10

## 2022-09-25 RX ADMIN — NYSTATIN 500000 UNITS: 100000 SUSPENSION ORAL at 18:08

## 2022-09-25 RX ADMIN — FENTANYL 1 PATCH: 50 PATCH TRANSDERMAL at 14:13

## 2022-09-25 RX ADMIN — METAXALONE 400 MG: 800 TABLET ORAL at 17:32

## 2022-09-25 RX ADMIN — ATORVASTATIN CALCIUM 10 MG: 20 TABLET, FILM COATED ORAL at 09:10

## 2022-09-25 RX ADMIN — OXYCODONE HYDROCHLORIDE 15 MG: 15 TABLET ORAL at 21:53

## 2022-09-25 RX ADMIN — Medication 2 PACKET: at 09:11

## 2022-09-25 RX ADMIN — HYDROMORPHONE HYDROCHLORIDE 1 MG: 1 INJECTION, SOLUTION INTRAMUSCULAR; INTRAVENOUS; SUBCUTANEOUS at 06:44

## 2022-09-25 RX ADMIN — PANTOPRAZOLE SODIUM 40 MG: 40 TABLET, DELAYED RELEASE ORAL at 05:22

## 2022-09-25 RX ADMIN — LETROZOLE 2.5 MG: 2.5 TABLET, FILM COATED ORAL at 09:10

## 2022-09-25 RX ADMIN — HYDROMORPHONE HYDROCHLORIDE 1 MG: 1 INJECTION, SOLUTION INTRAMUSCULAR; INTRAVENOUS; SUBCUTANEOUS at 18:26

## 2022-09-25 RX ADMIN — ESCITALOPRAM 10 MG: 10 TABLET, FILM COATED ORAL at 09:11

## 2022-09-25 RX ADMIN — LEVOTHYROXINE SODIUM 25 MCG: 0.03 TABLET ORAL at 05:22

## 2022-09-25 RX ADMIN — DEXAMETHASONE SODIUM PHOSPHATE 4 MG: 4 INJECTION, SOLUTION INTRAMUSCULAR; INTRAVENOUS at 21:53

## 2022-09-25 RX ADMIN — DEXAMETHASONE SODIUM PHOSPHATE 4 MG: 4 INJECTION, SOLUTION INTRAMUSCULAR; INTRAVENOUS at 14:01

## 2022-09-25 RX ADMIN — ENOXAPARIN SODIUM 40 MG: 100 INJECTION SUBCUTANEOUS at 14:13

## 2022-09-25 RX ADMIN — HYDROMORPHONE HYDROCHLORIDE 1 MG: 1 INJECTION, SOLUTION INTRAMUSCULAR; INTRAVENOUS; SUBCUTANEOUS at 00:30

## 2022-09-25 RX ADMIN — DOCUSATE SODIUM 50MG AND SENNOSIDES 8.6MG 2 TABLET: 8.6; 5 TABLET, FILM COATED ORAL at 09:10

## 2022-09-25 RX ADMIN — HYDROMORPHONE HYDROCHLORIDE 1 MG: 1 INJECTION, SOLUTION INTRAMUSCULAR; INTRAVENOUS; SUBCUTANEOUS at 14:01

## 2022-09-25 RX ADMIN — METAXALONE 400 MG: 800 TABLET ORAL at 05:21

## 2022-09-25 RX ADMIN — ALPRAZOLAM 0.5 MG: 0.5 TABLET ORAL at 21:53

## 2022-09-25 RX ADMIN — METAXALONE 400 MG: 800 TABLET ORAL at 00:29

## 2022-09-25 RX ADMIN — Medication 10 ML: at 21:53

## 2022-09-25 RX ADMIN — Medication 1000 MCG: at 09:11

## 2022-09-25 RX ADMIN — HYDROMORPHONE HYDROCHLORIDE 1 MG: 1 INJECTION, SOLUTION INTRAMUSCULAR; INTRAVENOUS; SUBCUTANEOUS at 09:10

## 2022-09-26 ENCOUNTER — APPOINTMENT (OUTPATIENT)
Dept: PET IMAGING | Facility: HOSPITAL | Age: 57
End: 2022-09-26

## 2022-09-26 LAB
ALBUMIN SERPL-MCNC: 3.2 G/DL (ref 3.5–5.2)
ANION GAP SERPL CALCULATED.3IONS-SCNC: 7.2 MMOL/L (ref 5–15)
ANISOCYTOSIS BLD QL: ABNORMAL
BACTERIA SPEC AEROBE CULT: NORMAL
BUN SERPL-MCNC: 19 MG/DL (ref 6–20)
BUN/CREAT SERPL: 19.6 (ref 7–25)
CALCIUM SPEC-SCNC: 8.2 MG/DL (ref 8.6–10.5)
CHLORIDE SERPL-SCNC: 107 MMOL/L (ref 98–107)
CO2 SERPL-SCNC: 25.8 MMOL/L (ref 22–29)
CREAT SERPL-MCNC: 0.97 MG/DL (ref 0.57–1)
CRP SERPL-MCNC: <0.3 MG/DL (ref 0–0.5)
DEPRECATED RDW RBC AUTO: 50.6 FL (ref 37–54)
EGFRCR SERPLBLD CKD-EPI 2021: 67.5 ML/MIN/1.73
ERYTHROCYTE [DISTWIDTH] IN BLOOD BY AUTOMATED COUNT: 15.6 % (ref 12.3–15.4)
GLUCOSE SERPL-MCNC: 111 MG/DL (ref 65–99)
HCT VFR BLD AUTO: 26.6 % (ref 34–46.6)
HGB BLD-MCNC: 8.2 G/DL (ref 12–15.9)
LYMPHOCYTES # BLD MANUAL: 0 10*3/MM3 (ref 0.7–3.1)
LYMPHOCYTES NFR BLD MANUAL: 3.1 % (ref 5–12)
MAGNESIUM SERPL-MCNC: 2.4 MG/DL (ref 1.6–2.6)
MAGNESIUM SERPL-MCNC: 2.6 MG/DL (ref 1.6–2.6)
MCH RBC QN AUTO: 27.9 PG (ref 26.6–33)
MCHC RBC AUTO-ENTMCNC: 30.8 G/DL (ref 31.5–35.7)
MCV RBC AUTO: 90.5 FL (ref 79–97)
METAMYELOCYTES NFR BLD MANUAL: 1 % (ref 0–0)
MONOCYTES # BLD: 0.56 10*3/MM3 (ref 0.1–0.9)
MYELOCYTES NFR BLD MANUAL: 5.1 % (ref 0–0)
NEUTROPHILS # BLD AUTO: 16.08 10*3/MM3 (ref 1.7–7)
NEUTROPHILS NFR BLD MANUAL: 89.8 % (ref 42.7–76)
NRBC SPEC MANUAL: 1 /100 WBC (ref 0–0.2)
PHOSPHATE SERPL-MCNC: 1.7 MG/DL (ref 2.5–4.5)
PHOSPHATE SERPL-MCNC: 2.5 MG/DL (ref 2.5–4.5)
PLAT MORPH BLD: NORMAL
PLATELET # BLD AUTO: 229 10*3/MM3 (ref 140–450)
PMV BLD AUTO: 9.2 FL (ref 6–12)
POTASSIUM SERPL-SCNC: 4.4 MMOL/L (ref 3.5–5.2)
PROCALCITONIN SERPL-MCNC: 0.15 NG/ML (ref 0–0.25)
PROMYELOCYTES NFR BLD MANUAL: 1 % (ref 0–0)
RBC # BLD AUTO: 2.94 10*6/MM3 (ref 3.77–5.28)
SODIUM SERPL-SCNC: 140 MMOL/L (ref 136–145)
VARIANT LYMPHS NFR BLD MANUAL: 0 % (ref 19.6–45.3)
WBC MORPH BLD: NORMAL
WBC NRBC COR # BLD: 17.91 10*3/MM3 (ref 3.4–10.8)

## 2022-09-26 PROCEDURE — 25010000002 HYDROMORPHONE 1 MG/ML SOLUTION: Performed by: INTERNAL MEDICINE

## 2022-09-26 PROCEDURE — 84100 ASSAY OF PHOSPHORUS: CPT | Performed by: INTERNAL MEDICINE

## 2022-09-26 PROCEDURE — 80069 RENAL FUNCTION PANEL: CPT | Performed by: INTERNAL MEDICINE

## 2022-09-26 PROCEDURE — 86140 C-REACTIVE PROTEIN: CPT | Performed by: INTERNAL MEDICINE

## 2022-09-26 PROCEDURE — 97110 THERAPEUTIC EXERCISES: CPT | Performed by: OCCUPATIONAL THERAPIST

## 2022-09-26 PROCEDURE — 83735 ASSAY OF MAGNESIUM: CPT | Performed by: INTERNAL MEDICINE

## 2022-09-26 PROCEDURE — 97162 PT EVAL MOD COMPLEX 30 MIN: CPT

## 2022-09-26 PROCEDURE — 84145 PROCALCITONIN (PCT): CPT | Performed by: INTERNAL MEDICINE

## 2022-09-26 PROCEDURE — 77334 RADIATION TREATMENT AID(S): CPT | Performed by: RADIOLOGY

## 2022-09-26 PROCEDURE — 97530 THERAPEUTIC ACTIVITIES: CPT

## 2022-09-26 PROCEDURE — 85007 BL SMEAR W/DIFF WBC COUNT: CPT | Performed by: INTERNAL MEDICINE

## 2022-09-26 PROCEDURE — 77263 THER RADIOLOGY TX PLNG CPLX: CPT | Performed by: RADIOLOGY

## 2022-09-26 PROCEDURE — 77470 SPECIAL RADIATION TREATMENT: CPT | Performed by: RADIOLOGY

## 2022-09-26 PROCEDURE — 25010000002 ENOXAPARIN PER 10 MG: Performed by: INTERNAL MEDICINE

## 2022-09-26 PROCEDURE — 99232 SBSQ HOSP IP/OBS MODERATE 35: CPT | Performed by: INTERNAL MEDICINE

## 2022-09-26 PROCEDURE — 85025 COMPLETE CBC W/AUTO DIFF WBC: CPT | Performed by: INTERNAL MEDICINE

## 2022-09-26 PROCEDURE — 97166 OT EVAL MOD COMPLEX 45 MIN: CPT | Performed by: OCCUPATIONAL THERAPIST

## 2022-09-26 PROCEDURE — 25010000002 DEXAMETHASONE PER 1 MG: Performed by: INTERNAL MEDICINE

## 2022-09-26 PROCEDURE — 99253 IP/OBS CNSLTJ NEW/EST LOW 45: CPT | Performed by: RADIOLOGY

## 2022-09-26 RX ORDER — MAGNESIUM SULFATE HEPTAHYDRATE 40 MG/ML
4 INJECTION, SOLUTION INTRAVENOUS AS NEEDED
Status: DISCONTINUED | OUTPATIENT
Start: 2022-09-26 | End: 2022-10-05

## 2022-09-26 RX ORDER — POLYETHYLENE GLYCOL 3350 17 G/17G
17 POWDER, FOR SOLUTION ORAL DAILY
Status: DISCONTINUED | OUTPATIENT
Start: 2022-09-26 | End: 2022-10-13 | Stop reason: HOSPADM

## 2022-09-26 RX ORDER — POTASSIUM CHLORIDE 1.5 G/1.77G
40 POWDER, FOR SOLUTION ORAL AS NEEDED
Status: DISCONTINUED | OUTPATIENT
Start: 2022-09-26 | End: 2022-10-08

## 2022-09-26 RX ORDER — POTASSIUM CHLORIDE 29.8 MG/ML
20 INJECTION INTRAVENOUS
Status: DISCONTINUED | OUTPATIENT
Start: 2022-09-26 | End: 2022-10-08

## 2022-09-26 RX ORDER — BISACODYL 10 MG
10 SUPPOSITORY, RECTAL RECTAL DAILY PRN
Status: DISCONTINUED | OUTPATIENT
Start: 2022-09-26 | End: 2022-09-26

## 2022-09-26 RX ORDER — MAGNESIUM SULFATE HEPTAHYDRATE 40 MG/ML
2 INJECTION, SOLUTION INTRAVENOUS AS NEEDED
Status: DISCONTINUED | OUTPATIENT
Start: 2022-09-26 | End: 2022-10-05

## 2022-09-26 RX ORDER — AMOXICILLIN 250 MG
2 CAPSULE ORAL 2 TIMES DAILY
Status: DISCONTINUED | OUTPATIENT
Start: 2022-09-26 | End: 2022-10-13 | Stop reason: HOSPADM

## 2022-09-26 RX ORDER — POTASSIUM CHLORIDE 750 MG/1
40 TABLET, FILM COATED, EXTENDED RELEASE ORAL AS NEEDED
Status: DISCONTINUED | OUTPATIENT
Start: 2022-09-26 | End: 2022-10-08

## 2022-09-26 RX ORDER — AMOXICILLIN 250 MG
2 CAPSULE ORAL 2 TIMES DAILY
Status: DISCONTINUED | OUTPATIENT
Start: 2022-09-26 | End: 2022-09-26

## 2022-09-26 RX ORDER — POLYETHYLENE GLYCOL 3350 17 G/17G
17 POWDER, FOR SOLUTION ORAL DAILY PRN
Status: DISCONTINUED | OUTPATIENT
Start: 2022-09-26 | End: 2022-09-26

## 2022-09-26 RX ORDER — BISACODYL 10 MG
10 SUPPOSITORY, RECTAL RECTAL DAILY PRN
Status: DISCONTINUED | OUTPATIENT
Start: 2022-09-26 | End: 2022-10-13 | Stop reason: HOSPADM

## 2022-09-26 RX ORDER — BISACODYL 5 MG/1
5 TABLET, DELAYED RELEASE ORAL DAILY PRN
Status: DISCONTINUED | OUTPATIENT
Start: 2022-09-26 | End: 2022-09-26

## 2022-09-26 RX ORDER — BISACODYL 5 MG/1
5 TABLET, DELAYED RELEASE ORAL DAILY PRN
Status: DISCONTINUED | OUTPATIENT
Start: 2022-09-26 | End: 2022-10-13 | Stop reason: HOSPADM

## 2022-09-26 RX ADMIN — DEXAMETHASONE SODIUM PHOSPHATE 4 MG: 4 INJECTION, SOLUTION INTRAMUSCULAR; INTRAVENOUS at 10:07

## 2022-09-26 RX ADMIN — PANTOPRAZOLE SODIUM 40 MG: 40 TABLET, DELAYED RELEASE ORAL at 05:46

## 2022-09-26 RX ADMIN — OXYCODONE HYDROCHLORIDE 15 MG: 15 TABLET ORAL at 10:11

## 2022-09-26 RX ADMIN — ESCITALOPRAM 10 MG: 10 TABLET, FILM COATED ORAL at 10:07

## 2022-09-26 RX ADMIN — Medication 2 PACKET: at 20:07

## 2022-09-26 RX ADMIN — Medication 10 ML: at 21:47

## 2022-09-26 RX ADMIN — Medication 2 PACKET: at 12:27

## 2022-09-26 RX ADMIN — HYDROMORPHONE HYDROCHLORIDE 1 MG: 1 INJECTION, SOLUTION INTRAMUSCULAR; INTRAVENOUS; SUBCUTANEOUS at 15:14

## 2022-09-26 RX ADMIN — GABAPENTIN 300 MG: 300 CAPSULE ORAL at 10:05

## 2022-09-26 RX ADMIN — GABAPENTIN 300 MG: 300 CAPSULE ORAL at 17:54

## 2022-09-26 RX ADMIN — LETROZOLE 2.5 MG: 2.5 TABLET, FILM COATED ORAL at 10:06

## 2022-09-26 RX ADMIN — DEXAMETHASONE SODIUM PHOSPHATE 4 MG: 4 INJECTION, SOLUTION INTRAMUSCULAR; INTRAVENOUS at 14:22

## 2022-09-26 RX ADMIN — METAXALONE 400 MG: 800 TABLET ORAL at 10:06

## 2022-09-26 RX ADMIN — Medication 10 ML: at 10:07

## 2022-09-26 RX ADMIN — OXYCODONE HYDROCHLORIDE 15 MG: 15 TABLET ORAL at 20:07

## 2022-09-26 RX ADMIN — LEVOTHYROXINE SODIUM 25 MCG: 0.03 TABLET ORAL at 05:46

## 2022-09-26 RX ADMIN — DEXAMETHASONE SODIUM PHOSPHATE 4 MG: 4 INJECTION, SOLUTION INTRAMUSCULAR; INTRAVENOUS at 20:07

## 2022-09-26 RX ADMIN — Medication 2 PACKET: at 10:06

## 2022-09-26 RX ADMIN — HYDROMORPHONE HYDROCHLORIDE 1 MG: 1 INJECTION, SOLUTION INTRAMUSCULAR; INTRAVENOUS; SUBCUTANEOUS at 17:54

## 2022-09-26 RX ADMIN — BUPROPION HYDROCHLORIDE 150 MG: 150 TABLET, EXTENDED RELEASE ORAL at 10:07

## 2022-09-26 RX ADMIN — CALCITRIOL 1 MCG: 0.25 CAPSULE ORAL at 10:07

## 2022-09-26 RX ADMIN — Medication 1000 MCG: at 10:06

## 2022-09-26 RX ADMIN — POLYETHYLENE GLYCOL 3350 17 G: 17 POWDER, FOR SOLUTION ORAL at 17:54

## 2022-09-26 RX ADMIN — HYDROMORPHONE HYDROCHLORIDE 1 MG: 1 INJECTION, SOLUTION INTRAMUSCULAR; INTRAVENOUS; SUBCUTANEOUS at 01:52

## 2022-09-26 RX ADMIN — OXYCODONE HYDROCHLORIDE 15 MG: 15 TABLET ORAL at 14:21

## 2022-09-26 RX ADMIN — DOCUSATE SODIUM 50MG AND SENNOSIDES 8.6MG 2 TABLET: 8.6; 5 TABLET, FILM COATED ORAL at 10:06

## 2022-09-26 RX ADMIN — DOCUSATE SODIUM 50MG AND SENNOSIDES 8.6MG 2 TABLET: 8.6; 5 TABLET, FILM COATED ORAL at 20:07

## 2022-09-26 RX ADMIN — DEXAMETHASONE SODIUM PHOSPHATE 4 MG: 4 INJECTION, SOLUTION INTRAMUSCULAR; INTRAVENOUS at 01:52

## 2022-09-26 RX ADMIN — GABAPENTIN 300 MG: 300 CAPSULE ORAL at 20:07

## 2022-09-26 RX ADMIN — METAXALONE 400 MG: 800 TABLET ORAL at 17:53

## 2022-09-26 RX ADMIN — ALPRAZOLAM 0.5 MG: 0.5 TABLET ORAL at 20:07

## 2022-09-26 RX ADMIN — HYDROMORPHONE HYDROCHLORIDE 1 MG: 1 INJECTION, SOLUTION INTRAMUSCULAR; INTRAVENOUS; SUBCUTANEOUS at 10:40

## 2022-09-26 RX ADMIN — METAXALONE 400 MG: 800 TABLET ORAL at 01:43

## 2022-09-26 RX ADMIN — ENOXAPARIN SODIUM 40 MG: 100 INJECTION SUBCUTANEOUS at 14:21

## 2022-09-26 RX ADMIN — HYDROMORPHONE HYDROCHLORIDE 1 MG: 1 INJECTION, SOLUTION INTRAMUSCULAR; INTRAVENOUS; SUBCUTANEOUS at 06:33

## 2022-09-26 RX ADMIN — ATORVASTATIN CALCIUM 10 MG: 20 TABLET, FILM COATED ORAL at 10:06

## 2022-09-26 RX ADMIN — OXYCODONE HYDROCHLORIDE 15 MG: 15 TABLET ORAL at 05:47

## 2022-09-27 ENCOUNTER — APPOINTMENT (OUTPATIENT)
Dept: ONCOLOGY | Facility: HOSPITAL | Age: 57
End: 2022-09-27

## 2022-09-27 LAB
DEPRECATED RDW RBC AUTO: 47.7 FL (ref 37–54)
ERYTHROCYTE [DISTWIDTH] IN BLOOD BY AUTOMATED COUNT: 15.3 % (ref 12.3–15.4)
HCT VFR BLD AUTO: 27.1 % (ref 34–46.6)
HGB BLD-MCNC: 8.7 G/DL (ref 12–15.9)
HYPOCHROMIA BLD QL: ABNORMAL
LYMPHOCYTES # BLD MANUAL: 0.18 10*3/MM3 (ref 0.7–3.1)
LYMPHOCYTES NFR BLD MANUAL: 2 % (ref 5–12)
MCH RBC QN AUTO: 27.4 PG (ref 26.6–33)
MCHC RBC AUTO-ENTMCNC: 32.1 G/DL (ref 31.5–35.7)
MCV RBC AUTO: 85.5 FL (ref 79–97)
MONOCYTES # BLD: 0.37 10*3/MM3 (ref 0.1–0.9)
MYELOCYTES NFR BLD MANUAL: 1 % (ref 0–0)
NEUTROPHILS # BLD AUTO: 17.69 10*3/MM3 (ref 1.7–7)
NEUTROPHILS NFR BLD MANUAL: 96 % (ref 42.7–76)
NRBC SPEC MANUAL: 1 /100 WBC (ref 0–0.2)
PLAT MORPH BLD: NORMAL
PLATELET # BLD AUTO: 220 10*3/MM3 (ref 140–450)
PMV BLD AUTO: 9.3 FL (ref 6–12)
POLYCHROMASIA BLD QL SMEAR: ABNORMAL
RBC # BLD AUTO: 3.17 10*6/MM3 (ref 3.77–5.28)
VARIANT LYMPHS NFR BLD MANUAL: 1 % (ref 19.6–45.3)
WBC MORPH BLD: NORMAL
WBC NRBC COR # BLD: 18.43 10*3/MM3 (ref 3.4–10.8)

## 2022-09-27 PROCEDURE — 99232 SBSQ HOSP IP/OBS MODERATE 35: CPT | Performed by: INTERNAL MEDICINE

## 2022-09-27 PROCEDURE — 25010000002 HYDROMORPHONE 1 MG/ML SOLUTION: Performed by: INTERNAL MEDICINE

## 2022-09-27 PROCEDURE — 25010000002 ENOXAPARIN PER 10 MG: Performed by: INTERNAL MEDICINE

## 2022-09-27 PROCEDURE — 85007 BL SMEAR W/DIFF WBC COUNT: CPT | Performed by: INTERNAL MEDICINE

## 2022-09-27 PROCEDURE — 63710000001 DEXAMETHASONE PER 0.25 MG: Performed by: INTERNAL MEDICINE

## 2022-09-27 PROCEDURE — 25010000002 DEXAMETHASONE PER 1 MG: Performed by: INTERNAL MEDICINE

## 2022-09-27 PROCEDURE — 85025 COMPLETE CBC W/AUTO DIFF WBC: CPT | Performed by: INTERNAL MEDICINE

## 2022-09-27 RX ORDER — DEXAMETHASONE 4 MG/1
4 TABLET ORAL EVERY 12 HOURS SCHEDULED
Status: DISCONTINUED | OUTPATIENT
Start: 2022-09-27 | End: 2022-10-02

## 2022-09-27 RX ORDER — GABAPENTIN 300 MG/1
300 CAPSULE ORAL EVERY 12 HOURS SCHEDULED
Status: DISCONTINUED | OUTPATIENT
Start: 2022-09-27 | End: 2022-09-30

## 2022-09-27 RX ORDER — FLUCONAZOLE 200 MG/1
200 TABLET ORAL DAILY
Status: COMPLETED | OUTPATIENT
Start: 2022-09-27 | End: 2022-10-01

## 2022-09-27 RX ADMIN — HYDROMORPHONE HYDROCHLORIDE 1 MG: 1 INJECTION, SOLUTION INTRAMUSCULAR; INTRAVENOUS; SUBCUTANEOUS at 18:32

## 2022-09-27 RX ADMIN — METAXALONE 400 MG: 800 TABLET ORAL at 15:30

## 2022-09-27 RX ADMIN — HYDROMORPHONE HYDROCHLORIDE 1 MG: 1 INJECTION, SOLUTION INTRAMUSCULAR; INTRAVENOUS; SUBCUTANEOUS at 08:36

## 2022-09-27 RX ADMIN — OXYCODONE HYDROCHLORIDE 15 MG: 15 TABLET ORAL at 14:27

## 2022-09-27 RX ADMIN — Medication 10 ML: at 09:22

## 2022-09-27 RX ADMIN — NYSTATIN 500000 UNITS: 100000 SUSPENSION ORAL at 09:10

## 2022-09-27 RX ADMIN — Medication 10 ML: at 20:35

## 2022-09-27 RX ADMIN — NYSTATIN 500000 UNITS: 100000 SUSPENSION ORAL at 12:44

## 2022-09-27 RX ADMIN — GABAPENTIN 300 MG: 300 CAPSULE ORAL at 09:11

## 2022-09-27 RX ADMIN — LETROZOLE 2.5 MG: 2.5 TABLET, FILM COATED ORAL at 09:09

## 2022-09-27 RX ADMIN — Medication 1000 MCG: at 09:09

## 2022-09-27 RX ADMIN — NYSTATIN 500000 UNITS: 100000 SUSPENSION ORAL at 20:34

## 2022-09-27 RX ADMIN — DEXAMETHASONE 4 MG: 4 TABLET ORAL at 20:34

## 2022-09-27 RX ADMIN — POLYETHYLENE GLYCOL 3350 17 G: 17 POWDER, FOR SOLUTION ORAL at 09:11

## 2022-09-27 RX ADMIN — OXYCODONE HYDROCHLORIDE 15 MG: 15 TABLET ORAL at 09:22

## 2022-09-27 RX ADMIN — OXYCODONE HYDROCHLORIDE 15 MG: 15 TABLET ORAL at 20:34

## 2022-09-27 RX ADMIN — HYDROMORPHONE HYDROCHLORIDE 1 MG: 1 INJECTION, SOLUTION INTRAMUSCULAR; INTRAVENOUS; SUBCUTANEOUS at 12:40

## 2022-09-27 RX ADMIN — Medication 2 PACKET: at 09:10

## 2022-09-27 RX ADMIN — BUPROPION HYDROCHLORIDE 150 MG: 150 TABLET, EXTENDED RELEASE ORAL at 09:09

## 2022-09-27 RX ADMIN — DEXAMETHASONE SODIUM PHOSPHATE 4 MG: 4 INJECTION, SOLUTION INTRAMUSCULAR; INTRAVENOUS at 02:07

## 2022-09-27 RX ADMIN — ENOXAPARIN SODIUM 40 MG: 100 INJECTION SUBCUTANEOUS at 16:00

## 2022-09-27 RX ADMIN — PANTOPRAZOLE SODIUM 40 MG: 40 TABLET, DELAYED RELEASE ORAL at 09:32

## 2022-09-27 RX ADMIN — DOCUSATE SODIUM 50MG AND SENNOSIDES 8.6MG 2 TABLET: 8.6; 5 TABLET, FILM COATED ORAL at 09:22

## 2022-09-27 RX ADMIN — Medication 2 PACKET: at 18:32

## 2022-09-27 RX ADMIN — CALCITRIOL 1 MCG: 0.25 CAPSULE ORAL at 09:09

## 2022-09-27 RX ADMIN — ESCITALOPRAM 10 MG: 10 TABLET, FILM COATED ORAL at 09:09

## 2022-09-27 RX ADMIN — NYSTATIN 500000 UNITS: 100000 SUSPENSION ORAL at 18:32

## 2022-09-27 RX ADMIN — METAXALONE 400 MG: 800 TABLET ORAL at 01:09

## 2022-09-27 RX ADMIN — FLUCONAZOLE 200 MG: 200 TABLET ORAL at 18:32

## 2022-09-27 RX ADMIN — GABAPENTIN 300 MG: 300 CAPSULE ORAL at 20:34

## 2022-09-27 RX ADMIN — DEXAMETHASONE SODIUM PHOSPHATE 4 MG: 4 INJECTION, SOLUTION INTRAMUSCULAR; INTRAVENOUS at 15:30

## 2022-09-27 RX ADMIN — DEXAMETHASONE SODIUM PHOSPHATE 4 MG: 4 INJECTION, SOLUTION INTRAMUSCULAR; INTRAVENOUS at 09:11

## 2022-09-27 RX ADMIN — OXYCODONE HYDROCHLORIDE 15 MG: 15 TABLET ORAL at 02:07

## 2022-09-27 RX ADMIN — ALPRAZOLAM 0.5 MG: 0.5 TABLET ORAL at 20:34

## 2022-09-27 RX ADMIN — ATORVASTATIN CALCIUM 10 MG: 20 TABLET, FILM COATED ORAL at 09:10

## 2022-09-28 ENCOUNTER — TREATMENT (OUTPATIENT)
Dept: RADIATION ONCOLOGY | Facility: HOSPITAL | Age: 57
End: 2022-09-28

## 2022-09-28 LAB
ALBUMIN SERPL-MCNC: 3.6 G/DL (ref 3.5–5.2)
ALBUMIN/GLOB SERPL: 1.4 G/DL
ALP SERPL-CCNC: 130 U/L (ref 39–117)
ALT SERPL W P-5'-P-CCNC: 18 U/L (ref 1–33)
ANION GAP SERPL CALCULATED.3IONS-SCNC: 6.6 MMOL/L (ref 5–15)
ANISOCYTOSIS BLD QL: ABNORMAL
AST SERPL-CCNC: 18 U/L (ref 1–32)
BILIRUB SERPL-MCNC: 0.3 MG/DL (ref 0–1.2)
BUN SERPL-MCNC: 19 MG/DL (ref 6–20)
BUN/CREAT SERPL: 21.1 (ref 7–25)
CALCIUM SPEC-SCNC: 8.3 MG/DL (ref 8.6–10.5)
CHLORIDE SERPL-SCNC: 104 MMOL/L (ref 98–107)
CO2 SERPL-SCNC: 30.4 MMOL/L (ref 22–29)
CREAT SERPL-MCNC: 0.9 MG/DL (ref 0.57–1)
DEPRECATED RDW RBC AUTO: 49 FL (ref 37–54)
EGFRCR SERPLBLD CKD-EPI 2021: 73.8 ML/MIN/1.73
ERYTHROCYTE [DISTWIDTH] IN BLOOD BY AUTOMATED COUNT: 15.6 % (ref 12.3–15.4)
GLOBULIN UR ELPH-MCNC: 2.5 GM/DL
GLUCOSE SERPL-MCNC: 119 MG/DL (ref 65–99)
HCT VFR BLD AUTO: 28.2 % (ref 34–46.6)
HGB BLD-MCNC: 9.1 G/DL (ref 12–15.9)
HYPOCHROMIA BLD QL: ABNORMAL
LYMPHOCYTES # BLD MANUAL: 0.19 10*3/MM3 (ref 0.7–3.1)
LYMPHOCYTES NFR BLD MANUAL: 5 % (ref 5–12)
MCH RBC QN AUTO: 28.1 PG (ref 26.6–33)
MCHC RBC AUTO-ENTMCNC: 32.3 G/DL (ref 31.5–35.7)
MCV RBC AUTO: 87 FL (ref 79–97)
MONOCYTES # BLD: 0.93 10*3/MM3 (ref 0.1–0.9)
NEUTROPHILS # BLD AUTO: 17.53 10*3/MM3 (ref 1.7–7)
NEUTROPHILS NFR BLD MANUAL: 94 % (ref 42.7–76)
NRBC BLD AUTO-RTO: 0.2 /100 WBC (ref 0–0.2)
PLAT MORPH BLD: NORMAL
PLATELET # BLD AUTO: 221 10*3/MM3 (ref 140–450)
PMV BLD AUTO: 9.4 FL (ref 6–12)
POTASSIUM SERPL-SCNC: 4.7 MMOL/L (ref 3.5–5.2)
PROT SERPL-MCNC: 6.1 G/DL (ref 6–8.5)
RAD ONC ARIA COURSE ID: NORMAL
RAD ONC ARIA COURSE INTENT: NORMAL
RAD ONC ARIA COURSE LAST TREATMENT DATE: NORMAL
RAD ONC ARIA COURSE START DATE: NORMAL
RAD ONC ARIA COURSE TREATMENT ELAPSED DAYS: 0
RAD ONC ARIA FIRST TREATMENT DATE: NORMAL
RAD ONC ARIA PLAN FRACTIONS TREATED TO DATE: 1
RAD ONC ARIA PLAN ID: NORMAL
RAD ONC ARIA PLAN PRESCRIBED DOSE PER FRACTION: 3 GY
RAD ONC ARIA PLAN PRIMARY REFERENCE POINT: NORMAL
RAD ONC ARIA PLAN TOTAL FRACTIONS PRESCRIBED: 10
RAD ONC ARIA PLAN TOTAL PRESCRIBED DOSE: 3000 CGY
RAD ONC ARIA REFERENCE POINT DOSAGE GIVEN TO DATE: 3 GY
RAD ONC ARIA REFERENCE POINT DOSAGE GIVEN TO DATE: 3.03 GY
RAD ONC ARIA REFERENCE POINT ID: NORMAL
RAD ONC ARIA REFERENCE POINT ID: NORMAL
RAD ONC ARIA REFERENCE POINT SESSION DOSAGE GIVEN: 3 GY
RAD ONC ARIA REFERENCE POINT SESSION DOSAGE GIVEN: 3.03 GY
RBC # BLD AUTO: 3.24 10*6/MM3 (ref 3.77–5.28)
SODIUM SERPL-SCNC: 141 MMOL/L (ref 136–145)
STOMATOCYTES BLD QL SMEAR: ABNORMAL
VARIANT LYMPHS NFR BLD MANUAL: 1 % (ref 19.6–45.3)
WBC MORPH BLD: NORMAL
WBC NRBC COR # BLD: 18.65 10*3/MM3 (ref 3.4–10.8)

## 2022-09-28 PROCEDURE — 77301 RADIOTHERAPY DOSE PLAN IMRT: CPT | Performed by: RADIOLOGY

## 2022-09-28 PROCEDURE — 77427 RADIATION TX MANAGEMENT X5: CPT | Performed by: RADIOLOGY

## 2022-09-28 PROCEDURE — 77338 DESIGN MLC DEVICE FOR IMRT: CPT | Performed by: RADIOLOGY

## 2022-09-28 PROCEDURE — 77300 RADIATION THERAPY DOSE PLAN: CPT | Performed by: RADIOLOGY

## 2022-09-28 PROCEDURE — 77386 CHG INTENSITY MODULATED RADIATION TX DLVR COMPLEX: CPT | Performed by: RADIOLOGY

## 2022-09-28 PROCEDURE — 85007 BL SMEAR W/DIFF WBC COUNT: CPT | Performed by: INTERNAL MEDICINE

## 2022-09-28 PROCEDURE — 63710000001 DEXAMETHASONE PER 0.25 MG: Performed by: INTERNAL MEDICINE

## 2022-09-28 PROCEDURE — 25010000002 ENOXAPARIN PER 10 MG: Performed by: INTERNAL MEDICINE

## 2022-09-28 PROCEDURE — 77386: CPT | Performed by: RADIOLOGY

## 2022-09-28 PROCEDURE — 97530 THERAPEUTIC ACTIVITIES: CPT

## 2022-09-28 PROCEDURE — 77014 CHG CT GUIDANCE RADIATION THERAPY FLDS PLACEMENT: CPT | Performed by: RADIOLOGY

## 2022-09-28 PROCEDURE — 25010000002 HYDROMORPHONE 1 MG/ML SOLUTION: Performed by: INTERNAL MEDICINE

## 2022-09-28 PROCEDURE — 85025 COMPLETE CBC W/AUTO DIFF WBC: CPT | Performed by: INTERNAL MEDICINE

## 2022-09-28 PROCEDURE — 99232 SBSQ HOSP IP/OBS MODERATE 35: CPT | Performed by: INTERNAL MEDICINE

## 2022-09-28 PROCEDURE — 80053 COMPREHEN METABOLIC PANEL: CPT | Performed by: INTERNAL MEDICINE

## 2022-09-28 RX ORDER — HYDRALAZINE HYDROCHLORIDE 10 MG/1
10 TABLET, FILM COATED ORAL EVERY 6 HOURS PRN
Status: DISCONTINUED | OUTPATIENT
Start: 2022-09-28 | End: 2022-10-13 | Stop reason: HOSPADM

## 2022-09-28 RX ADMIN — ENOXAPARIN SODIUM 40 MG: 100 INJECTION SUBCUTANEOUS at 15:16

## 2022-09-28 RX ADMIN — OXYCODONE HYDROCHLORIDE 15 MG: 15 TABLET ORAL at 00:38

## 2022-09-28 RX ADMIN — OXYCODONE HYDROCHLORIDE 15 MG: 15 TABLET ORAL at 08:45

## 2022-09-28 RX ADMIN — PANTOPRAZOLE SODIUM 40 MG: 40 TABLET, DELAYED RELEASE ORAL at 08:45

## 2022-09-28 RX ADMIN — OXYCODONE HYDROCHLORIDE 15 MG: 15 TABLET ORAL at 05:01

## 2022-09-28 RX ADMIN — POLYETHYLENE GLYCOL 3350 17 G: 17 POWDER, FOR SOLUTION ORAL at 08:45

## 2022-09-28 RX ADMIN — GABAPENTIN 300 MG: 300 CAPSULE ORAL at 20:43

## 2022-09-28 RX ADMIN — NYSTATIN 500000 UNITS: 100000 SUSPENSION ORAL at 12:00

## 2022-09-28 RX ADMIN — Medication 1000 MCG: at 08:45

## 2022-09-28 RX ADMIN — Medication 2 PACKET: at 18:26

## 2022-09-28 RX ADMIN — ATORVASTATIN CALCIUM 10 MG: 20 TABLET, FILM COATED ORAL at 08:45

## 2022-09-28 RX ADMIN — Medication 10 ML: at 20:45

## 2022-09-28 RX ADMIN — DOCUSATE SODIUM 50MG AND SENNOSIDES 8.6MG 2 TABLET: 8.6; 5 TABLET, FILM COATED ORAL at 08:45

## 2022-09-28 RX ADMIN — DEXAMETHASONE 4 MG: 4 TABLET ORAL at 20:44

## 2022-09-28 RX ADMIN — HYDROMORPHONE HYDROCHLORIDE 1 MG: 1 INJECTION, SOLUTION INTRAMUSCULAR; INTRAVENOUS; SUBCUTANEOUS at 16:32

## 2022-09-28 RX ADMIN — Medication 10 ML: at 13:18

## 2022-09-28 RX ADMIN — Medication 2 PACKET: at 08:50

## 2022-09-28 RX ADMIN — LETROZOLE 2.5 MG: 2.5 TABLET, FILM COATED ORAL at 08:43

## 2022-09-28 RX ADMIN — OXYCODONE HYDROCHLORIDE 15 MG: 15 TABLET ORAL at 22:20

## 2022-09-28 RX ADMIN — CALCITRIOL 1 MCG: 0.25 CAPSULE ORAL at 08:44

## 2022-09-28 RX ADMIN — FENTANYL 1 PATCH: 50 PATCH TRANSDERMAL at 15:14

## 2022-09-28 RX ADMIN — FLUCONAZOLE 200 MG: 200 TABLET ORAL at 08:45

## 2022-09-28 RX ADMIN — OXYCODONE HYDROCHLORIDE 15 MG: 15 TABLET ORAL at 18:26

## 2022-09-28 RX ADMIN — NYSTATIN 500000 UNITS: 100000 SUSPENSION ORAL at 20:45

## 2022-09-28 RX ADMIN — ALPRAZOLAM 0.5 MG: 0.5 TABLET ORAL at 22:20

## 2022-09-28 RX ADMIN — GABAPENTIN 300 MG: 300 CAPSULE ORAL at 08:45

## 2022-09-28 RX ADMIN — OXYCODONE HYDROCHLORIDE 15 MG: 15 TABLET ORAL at 13:15

## 2022-09-28 RX ADMIN — ESCITALOPRAM 10 MG: 10 TABLET, FILM COATED ORAL at 08:44

## 2022-09-28 RX ADMIN — BUPROPION HYDROCHLORIDE 150 MG: 150 TABLET, EXTENDED RELEASE ORAL at 08:44

## 2022-09-28 RX ADMIN — NYSTATIN 500000 UNITS: 100000 SUSPENSION ORAL at 18:26

## 2022-09-28 RX ADMIN — DEXAMETHASONE 4 MG: 4 TABLET ORAL at 08:45

## 2022-09-29 ENCOUNTER — TREATMENT (OUTPATIENT)
Dept: RADIATION ONCOLOGY | Facility: HOSPITAL | Age: 57
End: 2022-09-29

## 2022-09-29 LAB
ALBUMIN SERPL-MCNC: 3.7 G/DL (ref 3.5–5.2)
ALBUMIN/GLOB SERPL: 1.5 G/DL
ALP SERPL-CCNC: 132 U/L (ref 39–117)
ALT SERPL W P-5'-P-CCNC: 59 U/L (ref 1–33)
ANION GAP SERPL CALCULATED.3IONS-SCNC: 6.3 MMOL/L (ref 5–15)
AST SERPL-CCNC: 40 U/L (ref 1–32)
BACTERIA SPEC AEROBE CULT: NORMAL
BILIRUB SERPL-MCNC: 0.3 MG/DL (ref 0–1.2)
BUN SERPL-MCNC: 20 MG/DL (ref 6–20)
BUN/CREAT SERPL: 24.7 (ref 7–25)
CALCIUM SPEC-SCNC: 8.1 MG/DL (ref 8.6–10.5)
CHLORIDE SERPL-SCNC: 106 MMOL/L (ref 98–107)
CO2 SERPL-SCNC: 29.7 MMOL/L (ref 22–29)
CREAT SERPL-MCNC: 0.81 MG/DL (ref 0.57–1)
DEPRECATED RDW RBC AUTO: 51.6 FL (ref 37–54)
EGFRCR SERPLBLD CKD-EPI 2021: 83.7 ML/MIN/1.73
ERYTHROCYTE [DISTWIDTH] IN BLOOD BY AUTOMATED COUNT: 16.1 % (ref 12.3–15.4)
GLOBULIN UR ELPH-MCNC: 2.4 GM/DL
GLUCOSE SERPL-MCNC: 117 MG/DL (ref 65–99)
HCT VFR BLD AUTO: 28.6 % (ref 34–46.6)
HGB BLD-MCNC: 9.1 G/DL (ref 12–15.9)
LYMPHOCYTES # BLD MANUAL: 0.18 10*3/MM3 (ref 0.7–3.1)
MCH RBC QN AUTO: 28.2 PG (ref 26.6–33)
MCHC RBC AUTO-ENTMCNC: 31.8 G/DL (ref 31.5–35.7)
MCV RBC AUTO: 88.5 FL (ref 79–97)
METAMYELOCYTES NFR BLD MANUAL: 2.1 % (ref 0–0)
MYELOCYTES NFR BLD MANUAL: 2.1 % (ref 0–0)
NEUTROPHILS # BLD AUTO: 17.41 10*3/MM3 (ref 1.7–7)
NEUTROPHILS NFR BLD MANUAL: 94.8 % (ref 42.7–76)
NRBC SPEC MANUAL: 1 /100 WBC (ref 0–0.2)
PLAT MORPH BLD: NORMAL
PLATELET # BLD AUTO: 208 10*3/MM3 (ref 140–450)
PMV BLD AUTO: 9.2 FL (ref 6–12)
POTASSIUM SERPL-SCNC: 4.9 MMOL/L (ref 3.5–5.2)
PROT SERPL-MCNC: 6.1 G/DL (ref 6–8.5)
RAD ONC ARIA COURSE ID: NORMAL
RAD ONC ARIA COURSE INTENT: NORMAL
RAD ONC ARIA COURSE LAST TREATMENT DATE: NORMAL
RAD ONC ARIA COURSE START DATE: NORMAL
RAD ONC ARIA COURSE TREATMENT ELAPSED DAYS: 1
RAD ONC ARIA FIRST TREATMENT DATE: NORMAL
RAD ONC ARIA PLAN FRACTIONS TREATED TO DATE: 2
RAD ONC ARIA PLAN ID: NORMAL
RAD ONC ARIA PLAN PRESCRIBED DOSE PER FRACTION: 3 GY
RAD ONC ARIA PLAN PRIMARY REFERENCE POINT: NORMAL
RAD ONC ARIA PLAN TOTAL FRACTIONS PRESCRIBED: 10
RAD ONC ARIA PLAN TOTAL PRESCRIBED DOSE: 3000 CGY
RAD ONC ARIA REFERENCE POINT DOSAGE GIVEN TO DATE: 6 GY
RAD ONC ARIA REFERENCE POINT DOSAGE GIVEN TO DATE: 6.06 GY
RAD ONC ARIA REFERENCE POINT ID: NORMAL
RAD ONC ARIA REFERENCE POINT ID: NORMAL
RAD ONC ARIA REFERENCE POINT SESSION DOSAGE GIVEN: 3 GY
RAD ONC ARIA REFERENCE POINT SESSION DOSAGE GIVEN: 3.03 GY
RBC # BLD AUTO: 3.23 10*6/MM3 (ref 3.77–5.28)
RBC MORPH BLD: NORMAL
SODIUM SERPL-SCNC: 142 MMOL/L (ref 136–145)
VARIANT LYMPHS NFR BLD MANUAL: 1 % (ref 19.6–45.3)
WBC MORPH BLD: NORMAL
WBC NRBC COR # BLD: 18.36 10*3/MM3 (ref 3.4–10.8)

## 2022-09-29 PROCEDURE — 25010000002 ENOXAPARIN PER 10 MG: Performed by: INTERNAL MEDICINE

## 2022-09-29 PROCEDURE — 85007 BL SMEAR W/DIFF WBC COUNT: CPT | Performed by: INTERNAL MEDICINE

## 2022-09-29 PROCEDURE — 80053 COMPREHEN METABOLIC PANEL: CPT | Performed by: INTERNAL MEDICINE

## 2022-09-29 PROCEDURE — 99232 SBSQ HOSP IP/OBS MODERATE 35: CPT | Performed by: INTERNAL MEDICINE

## 2022-09-29 PROCEDURE — 77386 CHG INTENSITY MODULATED RADIATION TX DLVR COMPLEX: CPT | Performed by: RADIOLOGY

## 2022-09-29 PROCEDURE — 63710000001 DEXAMETHASONE PER 0.25 MG: Performed by: INTERNAL MEDICINE

## 2022-09-29 PROCEDURE — 97530 THERAPEUTIC ACTIVITIES: CPT

## 2022-09-29 PROCEDURE — 85025 COMPLETE CBC W/AUTO DIFF WBC: CPT | Performed by: INTERNAL MEDICINE

## 2022-09-29 PROCEDURE — 77386: CPT | Performed by: RADIOLOGY

## 2022-09-29 RX ORDER — AMLODIPINE BESYLATE 5 MG/1
5 TABLET ORAL
Status: DISCONTINUED | OUTPATIENT
Start: 2022-09-29 | End: 2022-10-02

## 2022-09-29 RX ORDER — HYDROMORPHONE HYDROCHLORIDE 1 MG/ML
0.5 INJECTION, SOLUTION INTRAMUSCULAR; INTRAVENOUS; SUBCUTANEOUS
Status: DISCONTINUED | OUTPATIENT
Start: 2022-09-29 | End: 2022-09-30

## 2022-09-29 RX ADMIN — Medication 2 PACKET: at 17:18

## 2022-09-29 RX ADMIN — ATORVASTATIN CALCIUM 10 MG: 20 TABLET, FILM COATED ORAL at 08:36

## 2022-09-29 RX ADMIN — OXYCODONE HYDROCHLORIDE 15 MG: 15 TABLET ORAL at 06:33

## 2022-09-29 RX ADMIN — NYSTATIN 500000 UNITS: 100000 SUSPENSION ORAL at 08:36

## 2022-09-29 RX ADMIN — POLYETHYLENE GLYCOL 3350 17 G: 17 POWDER, FOR SOLUTION ORAL at 08:35

## 2022-09-29 RX ADMIN — Medication 10 ML: at 09:06

## 2022-09-29 RX ADMIN — OXYCODONE HYDROCHLORIDE 15 MG: 15 TABLET ORAL at 02:30

## 2022-09-29 RX ADMIN — NYSTATIN 500000 UNITS: 100000 SUSPENSION ORAL at 21:19

## 2022-09-29 RX ADMIN — DEXAMETHASONE 4 MG: 4 TABLET ORAL at 08:36

## 2022-09-29 RX ADMIN — FLUCONAZOLE 200 MG: 200 TABLET ORAL at 08:37

## 2022-09-29 RX ADMIN — DEXAMETHASONE 4 MG: 4 TABLET ORAL at 21:19

## 2022-09-29 RX ADMIN — AMLODIPINE BESYLATE 5 MG: 5 TABLET ORAL at 10:55

## 2022-09-29 RX ADMIN — BUPROPION HYDROCHLORIDE 150 MG: 150 TABLET, EXTENDED RELEASE ORAL at 08:36

## 2022-09-29 RX ADMIN — ENOXAPARIN SODIUM 40 MG: 100 INJECTION SUBCUTANEOUS at 17:18

## 2022-09-29 RX ADMIN — LETROZOLE 2.5 MG: 2.5 TABLET, FILM COATED ORAL at 08:37

## 2022-09-29 RX ADMIN — DOCUSATE SODIUM 50MG AND SENNOSIDES 8.6MG 2 TABLET: 8.6; 5 TABLET, FILM COATED ORAL at 08:35

## 2022-09-29 RX ADMIN — OXYCODONE HYDROCHLORIDE 15 MG: 15 TABLET ORAL at 09:20

## 2022-09-29 RX ADMIN — Medication 1000 MCG: at 08:36

## 2022-09-29 RX ADMIN — Medication 10 ML: at 21:20

## 2022-09-29 RX ADMIN — PANTOPRAZOLE SODIUM 40 MG: 40 TABLET, DELAYED RELEASE ORAL at 06:33

## 2022-09-29 RX ADMIN — OXYCODONE HYDROCHLORIDE 15 MG: 15 TABLET ORAL at 21:19

## 2022-09-29 RX ADMIN — Medication 2 PACKET: at 08:37

## 2022-09-29 RX ADMIN — CALCITRIOL 1 MCG: 0.25 CAPSULE ORAL at 08:35

## 2022-09-29 RX ADMIN — OXYCODONE HYDROCHLORIDE 15 MG: 15 TABLET ORAL at 17:18

## 2022-09-29 RX ADMIN — ESCITALOPRAM 10 MG: 10 TABLET, FILM COATED ORAL at 08:36

## 2022-09-29 RX ADMIN — NYSTATIN 500000 UNITS: 100000 SUSPENSION ORAL at 12:33

## 2022-09-29 RX ADMIN — GABAPENTIN 300 MG: 300 CAPSULE ORAL at 08:36

## 2022-09-29 RX ADMIN — NYSTATIN 500000 UNITS: 100000 SUSPENSION ORAL at 17:18

## 2022-09-29 RX ADMIN — OXYCODONE HYDROCHLORIDE 15 MG: 15 TABLET ORAL at 13:28

## 2022-09-29 RX ADMIN — GABAPENTIN 300 MG: 300 CAPSULE ORAL at 21:19

## 2022-09-30 ENCOUNTER — TREATMENT (OUTPATIENT)
Dept: RADIATION ONCOLOGY | Facility: HOSPITAL | Age: 57
End: 2022-09-30

## 2022-09-30 LAB
ALBUMIN SERPL-MCNC: 3.9 G/DL (ref 3.5–5.2)
ALBUMIN/GLOB SERPL: 2.1 G/DL
ALP SERPL-CCNC: 119 U/L (ref 39–117)
ALT SERPL W P-5'-P-CCNC: 65 U/L (ref 1–33)
AMMONIA BLD-SCNC: 13 UMOL/L (ref 11–51)
ANION GAP SERPL CALCULATED.3IONS-SCNC: 11.5 MMOL/L (ref 5–15)
AST SERPL-CCNC: 43 U/L (ref 1–32)
BILIRUB SERPL-MCNC: 0.4 MG/DL (ref 0–1.2)
BUN SERPL-MCNC: 20 MG/DL (ref 6–20)
BUN/CREAT SERPL: 21.1 (ref 7–25)
CALCIUM SPEC-SCNC: 8.5 MG/DL (ref 8.6–10.5)
CHLORIDE SERPL-SCNC: 104 MMOL/L (ref 98–107)
CO2 SERPL-SCNC: 24.5 MMOL/L (ref 22–29)
CREAT SERPL-MCNC: 0.95 MG/DL (ref 0.57–1)
DEPRECATED RDW RBC AUTO: 53.7 FL (ref 37–54)
EGFRCR SERPLBLD CKD-EPI 2021: 69.2 ML/MIN/1.73
ERYTHROCYTE [DISTWIDTH] IN BLOOD BY AUTOMATED COUNT: 16.6 % (ref 12.3–15.4)
FOLATE SERPL-MCNC: >20 NG/ML (ref 4.78–24.2)
GLOBULIN UR ELPH-MCNC: 1.9 GM/DL
GLUCOSE SERPL-MCNC: 175 MG/DL (ref 65–99)
HCT VFR BLD AUTO: 32.2 % (ref 34–46.6)
HGB BLD-MCNC: 9.9 G/DL (ref 12–15.9)
LYMPHOCYTES # BLD MANUAL: 0.92 10*3/MM3 (ref 0.7–3.1)
MCH RBC QN AUTO: 28.2 PG (ref 26.6–33)
MCHC RBC AUTO-ENTMCNC: 30.7 G/DL (ref 31.5–35.7)
MCV RBC AUTO: 91.7 FL (ref 79–97)
MYELOCYTES NFR BLD MANUAL: 1 % (ref 0–0)
NEUTROPHILS # BLD AUTO: 17.27 10*3/MM3 (ref 1.7–7)
NEUTROPHILS NFR BLD MANUAL: 94 % (ref 42.7–76)
PLAT MORPH BLD: NORMAL
PLATELET # BLD AUTO: 206 10*3/MM3 (ref 140–450)
PMV BLD AUTO: 9.7 FL (ref 6–12)
POTASSIUM SERPL-SCNC: 4.5 MMOL/L (ref 3.5–5.2)
PROT SERPL-MCNC: 5.8 G/DL (ref 6–8.5)
RAD ONC ARIA COURSE ID: NORMAL
RAD ONC ARIA COURSE INTENT: NORMAL
RAD ONC ARIA COURSE LAST TREATMENT DATE: NORMAL
RAD ONC ARIA COURSE START DATE: NORMAL
RAD ONC ARIA COURSE TREATMENT ELAPSED DAYS: 2
RAD ONC ARIA FIRST TREATMENT DATE: NORMAL
RAD ONC ARIA PLAN FRACTIONS TREATED TO DATE: 3
RAD ONC ARIA PLAN ID: NORMAL
RAD ONC ARIA PLAN PRESCRIBED DOSE PER FRACTION: 3 GY
RAD ONC ARIA PLAN PRIMARY REFERENCE POINT: NORMAL
RAD ONC ARIA PLAN TOTAL FRACTIONS PRESCRIBED: 10
RAD ONC ARIA PLAN TOTAL PRESCRIBED DOSE: 3000 CGY
RAD ONC ARIA REFERENCE POINT DOSAGE GIVEN TO DATE: 9 GY
RAD ONC ARIA REFERENCE POINT DOSAGE GIVEN TO DATE: 9.09 GY
RAD ONC ARIA REFERENCE POINT ID: NORMAL
RAD ONC ARIA REFERENCE POINT ID: NORMAL
RAD ONC ARIA REFERENCE POINT SESSION DOSAGE GIVEN: 3 GY
RAD ONC ARIA REFERENCE POINT SESSION DOSAGE GIVEN: 3.03 GY
RBC # BLD AUTO: 3.51 10*6/MM3 (ref 3.77–5.28)
RBC MORPH BLD: NORMAL
SODIUM SERPL-SCNC: 140 MMOL/L (ref 136–145)
TSH SERPL DL<=0.05 MIU/L-ACNC: 0.24 UIU/ML (ref 0.27–4.2)
VARIANT LYMPHS NFR BLD MANUAL: 5 % (ref 19.6–45.3)
VIT B12 BLD-MCNC: 1592 PG/ML (ref 211–946)
WBC MORPH BLD: NORMAL
WBC NRBC COR # BLD: 18.37 10*3/MM3 (ref 3.4–10.8)

## 2022-09-30 PROCEDURE — 85007 BL SMEAR W/DIFF WBC COUNT: CPT | Performed by: INTERNAL MEDICINE

## 2022-09-30 PROCEDURE — 77014 CHG CT GUIDANCE RADIATION THERAPY FLDS PLACEMENT: CPT | Performed by: RADIOLOGY

## 2022-09-30 PROCEDURE — 99232 SBSQ HOSP IP/OBS MODERATE 35: CPT | Performed by: INTERNAL MEDICINE

## 2022-09-30 PROCEDURE — 82607 VITAMIN B-12: CPT | Performed by: INTERNAL MEDICINE

## 2022-09-30 PROCEDURE — 80053 COMPREHEN METABOLIC PANEL: CPT | Performed by: INTERNAL MEDICINE

## 2022-09-30 PROCEDURE — 82746 ASSAY OF FOLIC ACID SERUM: CPT | Performed by: INTERNAL MEDICINE

## 2022-09-30 PROCEDURE — 82140 ASSAY OF AMMONIA: CPT | Performed by: INTERNAL MEDICINE

## 2022-09-30 PROCEDURE — 77386: CPT | Performed by: RADIOLOGY

## 2022-09-30 PROCEDURE — 77386 CHG INTENSITY MODULATED RADIATION TX DLVR COMPLEX: CPT | Performed by: RADIOLOGY

## 2022-09-30 PROCEDURE — 77336 RADIATION PHYSICS CONSULT: CPT | Performed by: RADIOLOGY

## 2022-09-30 PROCEDURE — 84443 ASSAY THYROID STIM HORMONE: CPT | Performed by: INTERNAL MEDICINE

## 2022-09-30 PROCEDURE — 85025 COMPLETE CBC W/AUTO DIFF WBC: CPT | Performed by: INTERNAL MEDICINE

## 2022-09-30 PROCEDURE — 99222 1ST HOSP IP/OBS MODERATE 55: CPT | Performed by: PSYCHIATRY & NEUROLOGY

## 2022-09-30 PROCEDURE — 63710000001 DEXAMETHASONE PER 0.25 MG: Performed by: INTERNAL MEDICINE

## 2022-09-30 PROCEDURE — 25010000002 ENOXAPARIN PER 10 MG: Performed by: INTERNAL MEDICINE

## 2022-09-30 RX ORDER — DULOXETIN HYDROCHLORIDE 60 MG/1
60 CAPSULE, DELAYED RELEASE ORAL NIGHTLY
Status: DISCONTINUED | OUTPATIENT
Start: 2022-10-03 | End: 2022-10-02

## 2022-09-30 RX ORDER — FENTANYL 75 UG/H
1 PATCH TRANSDERMAL
Status: DISCONTINUED | OUTPATIENT
Start: 2022-09-30 | End: 2022-10-02

## 2022-09-30 RX ORDER — DULOXETIN HYDROCHLORIDE 30 MG/1
30 CAPSULE, DELAYED RELEASE ORAL NIGHTLY
Status: DISCONTINUED | OUTPATIENT
Start: 2022-09-30 | End: 2022-10-13 | Stop reason: HOSPADM

## 2022-09-30 RX ORDER — HYDROMORPHONE HYDROCHLORIDE 1 MG/ML
0.5 INJECTION, SOLUTION INTRAMUSCULAR; INTRAVENOUS; SUBCUTANEOUS EVERY 6 HOURS PRN
Status: DISPENSED | OUTPATIENT
Start: 2022-09-30 | End: 2022-10-07

## 2022-09-30 RX ADMIN — NYSTATIN 500000 UNITS: 100000 SUSPENSION ORAL at 17:46

## 2022-09-30 RX ADMIN — LEVOTHYROXINE SODIUM 25 MCG: 0.03 TABLET ORAL at 08:38

## 2022-09-30 RX ADMIN — OXYCODONE HYDROCHLORIDE 15 MG: 15 TABLET ORAL at 03:20

## 2022-09-30 RX ADMIN — NYSTATIN 500000 UNITS: 100000 SUSPENSION ORAL at 08:42

## 2022-09-30 RX ADMIN — DEXAMETHASONE 4 MG: 4 TABLET ORAL at 21:20

## 2022-09-30 RX ADMIN — Medication 2 PACKET: at 08:36

## 2022-09-30 RX ADMIN — Medication 10 ML: at 08:43

## 2022-09-30 RX ADMIN — POLYETHYLENE GLYCOL 3350 17 G: 17 POWDER, FOR SOLUTION ORAL at 08:47

## 2022-09-30 RX ADMIN — Medication 10 ML: at 21:20

## 2022-09-30 RX ADMIN — DOCUSATE SODIUM 50MG AND SENNOSIDES 8.6MG 2 TABLET: 8.6; 5 TABLET, FILM COATED ORAL at 08:55

## 2022-09-30 RX ADMIN — DULOXETINE HYDROCHLORIDE 30 MG: 30 CAPSULE, DELAYED RELEASE ORAL at 21:48

## 2022-09-30 RX ADMIN — OXYCODONE HYDROCHLORIDE 15 MG: 15 TABLET ORAL at 09:30

## 2022-09-30 RX ADMIN — DEXAMETHASONE 4 MG: 4 TABLET ORAL at 08:36

## 2022-09-30 RX ADMIN — CALCITRIOL 1 MCG: 0.25 CAPSULE ORAL at 08:37

## 2022-09-30 RX ADMIN — ENOXAPARIN SODIUM 40 MG: 100 INJECTION SUBCUTANEOUS at 17:46

## 2022-09-30 RX ADMIN — FLUCONAZOLE 200 MG: 200 TABLET ORAL at 08:42

## 2022-09-30 RX ADMIN — OXYCODONE HYDROCHLORIDE 15 MG: 15 TABLET ORAL at 17:47

## 2022-09-30 RX ADMIN — OXYCODONE HYDROCHLORIDE 15 MG: 15 TABLET ORAL at 12:47

## 2022-09-30 RX ADMIN — BUPROPION HYDROCHLORIDE 150 MG: 150 TABLET, EXTENDED RELEASE ORAL at 08:41

## 2022-09-30 RX ADMIN — Medication 2 PACKET: at 17:47

## 2022-09-30 RX ADMIN — OXYCODONE HYDROCHLORIDE 15 MG: 15 TABLET ORAL at 21:48

## 2022-09-30 RX ADMIN — PANTOPRAZOLE SODIUM 40 MG: 40 TABLET, DELAYED RELEASE ORAL at 08:36

## 2022-09-30 RX ADMIN — AMLODIPINE BESYLATE 5 MG: 5 TABLET ORAL at 08:40

## 2022-09-30 RX ADMIN — ESCITALOPRAM 10 MG: 10 TABLET, FILM COATED ORAL at 08:40

## 2022-09-30 RX ADMIN — LETROZOLE 2.5 MG: 2.5 TABLET, FILM COATED ORAL at 08:41

## 2022-09-30 RX ADMIN — FENTANYL 1 PATCH: 75 PATCH, EXTENDED RELEASE TRANSDERMAL at 18:48

## 2022-09-30 RX ADMIN — NYSTATIN 500000 UNITS: 100000 SUSPENSION ORAL at 21:20

## 2022-09-30 RX ADMIN — Medication 1000 MCG: at 08:40

## 2022-09-30 RX ADMIN — GABAPENTIN 300 MG: 300 CAPSULE ORAL at 08:41

## 2022-10-01 LAB
ALBUMIN SERPL-MCNC: 3.7 G/DL (ref 3.5–5.2)
ALBUMIN/GLOB SERPL: 2.1 G/DL
ALP SERPL-CCNC: 114 U/L (ref 39–117)
ALT SERPL W P-5'-P-CCNC: 78 U/L (ref 1–33)
ANION GAP SERPL CALCULATED.3IONS-SCNC: 8.5 MMOL/L (ref 5–15)
AST SERPL-CCNC: 35 U/L (ref 1–32)
BASOPHILS # BLD AUTO: 0.02 10*3/MM3 (ref 0–0.2)
BASOPHILS NFR BLD AUTO: 0.1 % (ref 0–1.5)
BILIRUB SERPL-MCNC: 0.3 MG/DL (ref 0–1.2)
BUN SERPL-MCNC: 20 MG/DL (ref 6–20)
BUN/CREAT SERPL: 22.5 (ref 7–25)
CALCIUM SPEC-SCNC: 8.3 MG/DL (ref 8.6–10.5)
CHLORIDE SERPL-SCNC: 103 MMOL/L (ref 98–107)
CO2 SERPL-SCNC: 26.5 MMOL/L (ref 22–29)
CREAT SERPL-MCNC: 0.89 MG/DL (ref 0.57–1)
DEPRECATED RDW RBC AUTO: 54.6 FL (ref 37–54)
EGFRCR SERPLBLD CKD-EPI 2021: 74.8 ML/MIN/1.73
EOSINOPHIL # BLD AUTO: 0 10*3/MM3 (ref 0–0.4)
EOSINOPHIL NFR BLD AUTO: 0 % (ref 0.3–6.2)
ERYTHROCYTE [DISTWIDTH] IN BLOOD BY AUTOMATED COUNT: 16.7 % (ref 12.3–15.4)
GLOBULIN UR ELPH-MCNC: 1.8 GM/DL
GLUCOSE SERPL-MCNC: 104 MG/DL (ref 65–99)
HCT VFR BLD AUTO: 30.1 % (ref 34–46.6)
HGB BLD-MCNC: 9.3 G/DL (ref 12–15.9)
IMM GRANULOCYTES # BLD AUTO: 0.76 10*3/MM3 (ref 0–0.05)
IMM GRANULOCYTES NFR BLD AUTO: 4.2 % (ref 0–0.5)
LYMPHOCYTES # BLD AUTO: 0.15 10*3/MM3 (ref 0.7–3.1)
LYMPHOCYTES NFR BLD AUTO: 0.8 % (ref 19.6–45.3)
MCH RBC QN AUTO: 28.2 PG (ref 26.6–33)
MCHC RBC AUTO-ENTMCNC: 30.9 G/DL (ref 31.5–35.7)
MCV RBC AUTO: 91.2 FL (ref 79–97)
MONOCYTES # BLD AUTO: 0.53 10*3/MM3 (ref 0.1–0.9)
MONOCYTES NFR BLD AUTO: 3 % (ref 5–12)
NEUTROPHILS NFR BLD AUTO: 16.48 10*3/MM3 (ref 1.7–7)
NEUTROPHILS NFR BLD AUTO: 91.9 % (ref 42.7–76)
NRBC BLD AUTO-RTO: 0.1 /100 WBC (ref 0–0.2)
PLATELET # BLD AUTO: 186 10*3/MM3 (ref 140–450)
PMV BLD AUTO: 9.9 FL (ref 6–12)
POTASSIUM SERPL-SCNC: 4.4 MMOL/L (ref 3.5–5.2)
PROT SERPL-MCNC: 5.5 G/DL (ref 6–8.5)
QT INTERVAL: 375 MS
RBC # BLD AUTO: 3.3 10*6/MM3 (ref 3.77–5.28)
SODIUM SERPL-SCNC: 138 MMOL/L (ref 136–145)
WBC NRBC COR # BLD: 17.94 10*3/MM3 (ref 3.4–10.8)

## 2022-10-01 PROCEDURE — 63710000001 DEXAMETHASONE PER 0.25 MG: Performed by: INTERNAL MEDICINE

## 2022-10-01 PROCEDURE — 97530 THERAPEUTIC ACTIVITIES: CPT

## 2022-10-01 PROCEDURE — 25010000002 HYDROMORPHONE PER 4 MG: Performed by: INTERNAL MEDICINE

## 2022-10-01 PROCEDURE — 93010 ELECTROCARDIOGRAM REPORT: CPT | Performed by: STUDENT IN AN ORGANIZED HEALTH CARE EDUCATION/TRAINING PROGRAM

## 2022-10-01 PROCEDURE — 25010000002 ENOXAPARIN PER 10 MG: Performed by: INTERNAL MEDICINE

## 2022-10-01 PROCEDURE — 80053 COMPREHEN METABOLIC PANEL: CPT | Performed by: INTERNAL MEDICINE

## 2022-10-01 PROCEDURE — 99232 SBSQ HOSP IP/OBS MODERATE 35: CPT | Performed by: INTERNAL MEDICINE

## 2022-10-01 PROCEDURE — 85025 COMPLETE CBC W/AUTO DIFF WBC: CPT | Performed by: INTERNAL MEDICINE

## 2022-10-01 PROCEDURE — 93005 ELECTROCARDIOGRAM TRACING: CPT | Performed by: INTERNAL MEDICINE

## 2022-10-01 RX ORDER — TEMAZEPAM 15 MG/1
15 CAPSULE ORAL NIGHTLY PRN
Status: DISCONTINUED | OUTPATIENT
Start: 2022-10-01 | End: 2022-10-02

## 2022-10-01 RX ADMIN — DOCUSATE SODIUM 50MG AND SENNOSIDES 8.6MG 2 TABLET: 8.6; 5 TABLET, FILM COATED ORAL at 09:51

## 2022-10-01 RX ADMIN — NYSTATIN 500000 UNITS: 100000 SUSPENSION ORAL at 09:50

## 2022-10-01 RX ADMIN — FLUCONAZOLE 200 MG: 200 TABLET ORAL at 09:49

## 2022-10-01 RX ADMIN — NYSTATIN 500000 UNITS: 100000 SUSPENSION ORAL at 20:33

## 2022-10-01 RX ADMIN — PANTOPRAZOLE SODIUM 40 MG: 40 TABLET, DELAYED RELEASE ORAL at 05:16

## 2022-10-01 RX ADMIN — OXYCODONE HYDROCHLORIDE 15 MG: 15 TABLET ORAL at 01:20

## 2022-10-01 RX ADMIN — OXYCODONE HYDROCHLORIDE 15 MG: 15 TABLET ORAL at 05:16

## 2022-10-01 RX ADMIN — HYDROMORPHONE HYDROCHLORIDE 0.5 MG: 1 INJECTION, SOLUTION INTRAMUSCULAR; INTRAVENOUS; SUBCUTANEOUS at 12:50

## 2022-10-01 RX ADMIN — DULOXETINE HYDROCHLORIDE 30 MG: 30 CAPSULE, DELAYED RELEASE ORAL at 20:33

## 2022-10-01 RX ADMIN — TEMAZEPAM 15 MG: 15 CAPSULE ORAL at 20:33

## 2022-10-01 RX ADMIN — Medication 10 ML: at 09:49

## 2022-10-01 RX ADMIN — ENOXAPARIN SODIUM 40 MG: 100 INJECTION SUBCUTANEOUS at 16:28

## 2022-10-01 RX ADMIN — Medication 2 PACKET: at 09:49

## 2022-10-01 RX ADMIN — Medication 1000 MCG: at 09:49

## 2022-10-01 RX ADMIN — LETROZOLE 2.5 MG: 2.5 TABLET, FILM COATED ORAL at 09:48

## 2022-10-01 RX ADMIN — Medication 2 PACKET: at 18:53

## 2022-10-01 RX ADMIN — OXYCODONE HYDROCHLORIDE 15 MG: 15 TABLET ORAL at 16:36

## 2022-10-01 RX ADMIN — OXYCODONE HYDROCHLORIDE 15 MG: 15 TABLET ORAL at 14:08

## 2022-10-01 RX ADMIN — DEXAMETHASONE 4 MG: 4 TABLET ORAL at 09:48

## 2022-10-01 RX ADMIN — AMLODIPINE BESYLATE 5 MG: 5 TABLET ORAL at 09:49

## 2022-10-01 RX ADMIN — CALCITRIOL 1 MCG: 0.25 CAPSULE ORAL at 09:48

## 2022-10-01 RX ADMIN — HYDROMORPHONE HYDROCHLORIDE 0.5 MG: 1 INJECTION, SOLUTION INTRAMUSCULAR; INTRAVENOUS; SUBCUTANEOUS at 10:57

## 2022-10-01 RX ADMIN — BUPROPION HYDROCHLORIDE 150 MG: 150 TABLET, EXTENDED RELEASE ORAL at 09:49

## 2022-10-01 RX ADMIN — DEXAMETHASONE 4 MG: 4 TABLET ORAL at 20:33

## 2022-10-01 RX ADMIN — POLYETHYLENE GLYCOL 3350 17 G: 17 POWDER, FOR SOLUTION ORAL at 09:51

## 2022-10-01 RX ADMIN — LEVOTHYROXINE SODIUM 25 MCG: 0.03 TABLET ORAL at 05:16

## 2022-10-01 RX ADMIN — OXYCODONE HYDROCHLORIDE 15 MG: 15 TABLET ORAL at 10:00

## 2022-10-01 RX ADMIN — OXYCODONE HYDROCHLORIDE 15 MG: 15 TABLET ORAL at 20:33

## 2022-10-01 RX ADMIN — Medication 10 ML: at 20:33

## 2022-10-01 NOTE — PROGRESS NOTES
Subjective     CHIEF COMPLAINT:     Metastatic non-small cell lung cancer  Cancer-related pain    INTERVAL HISTORY:     Patient was seen with her son at bedside.  She is complaining pain which is currently in the legs.  The pain is in form of cramps.  No significant pain in the lower back.    REVIEW OF SYSTEMS:  A comprehensive review of systems was obtained with pertinent positive findings as noted in the interval history above.  All other systems negative.    SCHEDULED MEDS:  amLODIPine, 5 mg, Oral, Q24H  buPROPion XL, 150 mg, Oral, Daily  calcitriol, 1 mcg, Oral, Daily  fentaNYL, 1 patch, Transdermal, Q72H   And  Check Fentanyl Patch Placement, 1 each, Does not apply, Q12H  dexamethasone, 4 mg, Oral, Q12H  DULoxetine, 30 mg, Oral, Nightly  [START ON 10/3/2022] DULoxetine, 60 mg, Oral, Nightly  enoxaparin, 40 mg, Subcutaneous, Q24H  folic acid, 1,000 mcg, Oral, Daily  letrozole, 2.5 mg, Oral, Daily  levothyroxine, 25 mcg, Oral, Q AM  nystatin, 5 mL, Swish & Swallow, 4x Daily  pantoprazole, 40 mg, Oral, QAM  polyethylene glycol, 17 g, Oral, Daily   And  senna-docusate sodium, 2 tablet, Oral, BID  potassium & sodium phosphates, 2 packet, Oral, BID AC  sodium chloride, 10 mL, Intravenous, Q12H      PRN MEDS:  •  acetaminophen **OR** acetaminophen **OR** acetaminophen  •  ALPRAZolam  •  senna-docusate sodium **AND** polyethylene glycol **AND** bisacodyl **AND** bisacodyl  •  heparin  •  hydrALAZINE  •  HYDROmorphone  •  magnesium sulfate **OR** magnesium sulfate **OR** magnesium sulfate  •  ondansetron  •  oxyCODONE  •  potassium & sodium phosphates **OR** potassium & sodium phosphates  •  potassium chloride **OR** potassium chloride **OR** potassium chloride  •  prochlorperazine  •  sodium chloride  •  sodium chloride       Objective   VITAL SIGNS:  Temp:  [97.1 °F (36.2 °C)-98.5 °F (36.9 °C)] 97.3 °F (36.3 °C)  Heart Rate:  [] 87  Resp:  [18] 18  BP: (121-161)/() 135/93     PHYSICAL EXAMINATION:    GENERAL: Weak, not in acute distress.  SKIN: No skin rash. No ecchymosis.   HEAD:  Normocephalic.  EYES: Pallor.  No jaundice.  NECK:  Supple. No Masses.  CHEST: Normal respiratory effort.  Lungs clear bilaterally.  No added sounds.  CARDIAC: Normal S1-S2.  No murmurs.  ABDOMEN:  Soft. No tenderness. No Hepatomegaly. No Splenomegaly.   EXTREMITIES: No edema.  No calf tenderness.    RESULT REVIEW:   Results from last 7 days   Lab Units 10/01/22  0531 09/30/22  0908 09/29/22  0636 09/28/22  0502 09/27/22  0621 09/26/22  0550   WBC 10*3/mm3 17.94* 18.37* 18.36* 18.65* 18.43* 17.91*   NEUTROS ABS 10*3/mm3 16.48* 17.27* 17.41* 17.53* 17.69* 16.08*   LYMPHS ABS 10*3/mm3  --  0.92 0.18* 0.19* 0.18* 0.00*   HEMOGLOBIN g/dL 9.3* 9.9* 9.1* 9.1* 8.7* 8.2*   HEMATOCRIT % 30.1* 32.2* 28.6* 28.2* 27.1* 26.6*   PLATELETS 10*3/mm3 186 206 208 221 220 229     Results from last 7 days   Lab Units 10/01/22  0531 09/30/22  0908 09/29/22  0636 09/28/22  0502 09/26/22  1836 09/26/22  0550 09/25/22  0524   SODIUM mmol/L 138 140 142 141  --  140 141   POTASSIUM mmol/L 4.4 4.5 4.9 4.7  --  4.4 4.5   CHLORIDE mmol/L 103 104 106 104  --  107 107   CO2 mmol/L 26.5 24.5 29.7* 30.4*  --  25.8 24.1   BUN mg/dL 20 20 20 19  --  19 12   CREATININE mg/dL 0.89 0.95 0.81 0.90  --  0.97 0.99   CALCIUM mg/dL 8.3* 8.5* 8.1* 8.3*  --  8.2* 8.1*   ALBUMIN g/dL 3.70 3.90 3.70 3.60  --  3.20* 3.40*   BILIRUBIN mg/dL 0.3 0.4 0.3 0.3  --   --  <0.2   ALK PHOS U/L 114 119* 132* 130*  --   --  169*   ALT (SGPT) U/L 78* 65* 59* 18  --   --  10   AST (SGOT) U/L 35* 43* 40* 18  --   --  10   MAGNESIUM mg/dL  --   --   --   --  2.6 2.4  --      Assessment & Plan   *Metastatic non-small cell lung cancer  · She was initially diagnosed with stage III (T1N2M0) disease.    · She received initial therapy with cisplatin and Alimta concurrent with radiation therapy beginning 5/25/2021.    · There were indeterminate findings in the lumbar spine at L1 and it was recommended  to monitor this over time.    · Patient completed 3 cycles cisplatin and Alimta 7/7/2021 and completed radiation therapy 7/12/2021.    · PET scan 8/6/2021 showed good response to treatment.    · MRI lumbar spine 10/1/2021 with increase in size of the L1 lesion.    · Biopsy of the L1 lesion on 10/14/2022 confirmed metastatic adenocarcinoma of lung origin, PD-L1 TPS 0.    · PET scan 10/26/2021 with involvement of left adrenal and L1/L2 only.    · Patient received radiation therapy to L1/L2 on 11/19/2021.  She also received radiation to the left adrenal gland.    · She initiated further systemic therapy on 11/22/2021 with Keytruda and Alimta.    · Follow-up PET scan 1/18/2022 with decrease in small left upper lobe pulmonary nodule, resolution of activity at L1/L2, no new sites of disease.    · Guardant 360 CT DNA level was monitored and was decreasing.    · PET scan 4/11/2022 with progression in left adrenal and L1.    · Maintained systemic therapy with Keytruda and Alimta and received additional radiation to left adrenal and L1.  Alimta however held since 5/10/2022 due to renal dysfunction.  Radiation completed to lumbar spine 7/14/2022.    · PET scan 7/18/2022 with multiple areas of progression of the spine and right sacrum.    · Guardant 360 analysis 7/20/2022 with no actionable mutations.    · Change in therapy to single agent palliative Taxotere initiated 7/26/2022.    · Altered mental status and febrile neutropenia requiring hospitalization 8/2 - 8/9/2022.  During that admission, MRI brain, cervical, thoracic, lumbar spine performed 8/4/2022 in addition to CT cervical spine 8/5/2022.  There was evidence of C2 metastatic lesion with some dural enhancement, compression deformity at T7 with mild edema suggesting acute to subacute fracture, 5 mm T12 spinous process metastasis, multifocal disease in the lumbar spine and sacrum, largest involving sacral ala to the right 5 cm in transverse dimension.  Loss of height at  L2 25%.  · Patient received cycle 3 Taxotere 9/6/2022 with Neulasta support.    · She has been continuing as well on Xgeva every 4 weeks (last received 9/6/2022).  · Patient did undergo MRI lumbar spine and pelvis on 9/24/2022.  MRI pelvis showed bilateral sacral fractures with marrow infiltration related to metastasis more prominent on the right.  Lesion on the right 3.5 x 4.1 x 4.3 cm.  Also probable metastasis along posterior acetabulum on the right 1.5 cm.  MRI lumbar spine with stable L1 metastasis, new edema endplate T12 possibly stress reaction versus developing new metastasis, lesion at T12 spinous process unchanged.   · Radiation oncology consulted with plans for palliative treatment of the right sacral metastasis.  Treatment initiated 9/28/2022  · Patient was going to have a PET scan.  We will delay for now.  · Patient will most likely need subacute rehab at discharge.      *Bilateral stage I breast cancer  · Patient with bilateral breast cancer, both stage I ([id number]), grade 2, ER/NV positive and HER2/melinda negative with low Ki-67.    · Patient initiated adjuvant letrozole in May 2021.     *Cancer related pain  · Patient has been receiving Duragesic patch 50 mcg every 72 hours in addition to oxycodone 15 mg every 4 hours for breakthrough pain.  Duragesic dose had been escalated recently in the outpatient setting due to worsening pain  · On admission 9/23/2022, Duragesic patch increased to 75 mcg daily, added Decadron 4 mg IV every 6 hours with continuation of oxycodone 15 mg every 4 hours as needed for breakthrough pain in addition of Dilaudid 1 mg every 2 hours as needed for breakthrough pain.  · Patient with increasing side effects from narcotics, Duragesic decreased on 9/25/2022 from 75 down to 50 mcg patch every 72 hours.  · Dexamethasone dose decreased to 4 mg p.o. every 12 hours on 9/27/2022  · Initiated palliative radiation to the right sacrum on 9/28/2022  · Patient became confused after receiving  Dilaudid.  Patient and family asked to discontinue Dilaudid.  · Duragesic patch dose was increased to 75 mcg/h on 9/30/2022.  · Gabapentin was discontinued due to the sedating effect.  · Oxycodone is being used for breakthrough pain.     *Anxiety/depression  · Patient was previously on Wellbutrin  mg daily, Lexapro 10 mg daily.  · On 9/30/2022, she was switched from Lexapro to Cymbalta 30 mg daily.     *Anemia  · Most recent evaluation 8/3/2022 with iron 15, ferritin 276, iron saturation 5%, TIBC 328, folate greater than 20, B12 392  · Anemia felt to be related to malignancy/chronic disease and chemotherapy  · Hemoglobin has remained in the 8-10 range  · Hemoglobin is 9.3 today.     *Peripheral neuropathy  · Patient was on gabapentin 300 mg twice daily.  · Gabapentin was discontinued on 9/30/2022.  · She is currently on Cymbalta.     *Narcotic induced constipation  · Patient is on Sybil-Colace 2 tablets twice daily with MiraLAX daily   · Patient is now having bowel movements.     *GI prophylaxis  · Protonix 40 mg daily     *DVT prophylaxis  · Lovenox 40 mg daily     *Elevated LFTs  · Labs today with new elevation in LFTs with ALT 59, AST 40  · Discontinue atorvastatin  · Could be related to Diflucan  · ALT increased to 78.  AST improved to 35 today.  Bilirubin is normal.     PLAN:    1.  Continue current pain regimen today.  2.  If pain in the legs does not improve, we will increase the dose of Cymbalta tomorrow.    Discussed with son at the bedside.  Discussed with the patient's RN.      Demetrice Bhatt MD  10/01/22

## 2022-10-01 NOTE — PLAN OF CARE
Goal Outcome Evaluation:  Plan of Care Reviewed With: patient           Outcome Evaluation: Pt. continues to experience intermittent confusion/disorientation. Reoriented often. Neurology consulted. Pain medications reviewed with MD and Pharmacist. Fentanyl patch dose adjusted. Pt. requring PRN pain medication Q4H. Pt. up to BSC with asstx2 once this shift. Port needle and dressing changed this shift; CDI with good blood return. Bed alarm in place.

## 2022-10-01 NOTE — PLAN OF CARE
Goal Outcome Evaluation:  Plan of Care Reviewed With: patient, spouse, daughter, son        Progress: no change  Outcome Evaluation: pt continues to be confused throughout the shift with some periods of clarity. family members worried about confusion, reviewed POC and MD notes with family members. Pt complaining of severe pain, in tears and restless during parts of my shift, ended up giving IV dilaudid as needed. discussed this with family and MD. extra dose of victoria given after PT after discussing with CBC. up to bsc with assist. ambulated some with PT. encouraging to eat and increase food intake, boost given. discussed with family and MD's about potential lack of sleep, restoril ordered for night time. VSS. bed alarm and 3 rails up at all times

## 2022-10-01 NOTE — PLAN OF CARE
Goal Outcome Evaluation:  Plan of Care Reviewed With: patient        Progress: improving  Outcome Evaluation: Pt tolerated treatment fair this date. Limited overall d/t LE pain. Ambulated 30ft w/ Rw and CGA, unsteady though no overt LOBs noted. Encouraged pt to ambulate w/ nsg during the day.

## 2022-10-01 NOTE — PROGRESS NOTES
Name: Holli Patel ADMIT: 2022   : 1965  PCP: Vandana Gastelum MD    MRN: [id number] LOS: 8 days   AGE/SEX: 57 y.o. female  ROOM: [facility]   Subjective   No chief complaint on file.     Pain present but controlled. Not reporting CP SOA NVD. She was about to use the restroom when I saw her.    Objective   Vital Signs  Temp:  [97.1 °F (36.2 °C)-98.5 °F (36.9 °C)] 98 °F (36.7 °C)  Heart Rate:  [78-94] 94  Resp:  [16-18] 16  BP: (121-135)/(78-93) 124/92  SpO2:  [94 %-98 %] 96 %  on   ;   Device (Oxygen Therapy): room air  Body mass index is 28.12 kg/m².    Physical Exam  Vitals and nursing note reviewed.   Constitutional:       General: She is not in acute distress.     Appearance: She is ill-appearing. She is not diaphoretic.   HENT:      Head: Normocephalic and atraumatic.   Eyes:      General:         Right eye: No discharge.         Left eye: No discharge.      Conjunctiva/sclera: Conjunctivae normal.   Cardiovascular:      Rate and Rhythm: Normal rate and regular rhythm.      Pulses: Normal pulses.   Pulmonary:      Effort: Pulmonary effort is normal.      Breath sounds: No wheezing.   Abdominal:      General: There is no distension.      Palpations: Abdomen is soft.      Tenderness: There is no abdominal tenderness. There is no guarding or rebound.   Musculoskeletal:         General: Tenderness present. No swelling.      Cervical back: Neck supple. No tenderness.   Skin:     General: Skin is warm and dry.   Neurological:      Mental Status: She is alert. Mental status is at baseline.   Psychiatric:         Mood and Affect: Mood normal.         Behavior: Behavior normal.         Results Review:       I reviewed the patient's new clinical results.    Results from last 7 days   Lab Units 10/01/22  0531 22  0908 22  0636 22  0502   WBC 10*3/mm3 17.94* 18.37* 18.36* 18.65*   HEMOGLOBIN g/dL 9.3* 9.9* 9.1* 9.1*   PLATELETS 10*3/mm3 186 206 208 221     Results from last 7 days   Lab  Units 10/01/22  0531 09/30/22  0908 09/29/22  0636 09/28/22  0502   SODIUM mmol/L 138 140 142 141   POTASSIUM mmol/L 4.4 4.5 4.9 4.7   CHLORIDE mmol/L 103 104 106 104   CO2 mmol/L 26.5 24.5 29.7* 30.4*   BUN mg/dL 20 20 20 19   CREATININE mg/dL 0.89 0.95 0.81 0.90   GLUCOSE mg/dL 104* 175* 117* 119*   Estimated Creatinine Clearance: 64.7 mL/min (by C-G formula based on SCr of 0.89 mg/dL).  Results from last 7 days   Lab Units 10/01/22  0531 09/30/22  0908 09/29/22  0636 09/28/22  0502 09/26/22  1836 09/26/22  0550   CALCIUM mg/dL 8.3* 8.5* 8.1* 8.3*  --  8.2*   ALBUMIN g/dL 3.70 3.90 3.70 3.60  --  3.20*   MAGNESIUM mg/dL  --   --   --   --  2.6 2.4   PHOSPHORUS mg/dL  --   --   --   --  2.5 1.7*          amLODIPine, 5 mg, Oral, Q24H  buPROPion XL, 150 mg, Oral, Daily  calcitriol, 1 mcg, Oral, Daily  fentaNYL, 1 patch, Transdermal, Q72H   And  Check Fentanyl Patch Placement, 1 each, Does not apply, Q12H  dexamethasone, 4 mg, Oral, Q12H  DULoxetine, 30 mg, Oral, Nightly  [START ON 10/3/2022] DULoxetine, 60 mg, Oral, Nightly  enoxaparin, 40 mg, Subcutaneous, Q24H  folic acid, 1,000 mcg, Oral, Daily  letrozole, 2.5 mg, Oral, Daily  levothyroxine, 25 mcg, Oral, Q AM  nystatin, 5 mL, Swish & Swallow, 4x Daily  pantoprazole, 40 mg, Oral, QAM  polyethylene glycol, 17 g, Oral, Daily   And  senna-docusate sodium, 2 tablet, Oral, BID  potassium & sodium phosphates, 2 packet, Oral, BID AC  sodium chloride, 10 mL, Intravenous, Q12H       Diet Regular; Cardiac    Assessment & Plan      Active Hospital Problems    Diagnosis  POA   • **Cancer related pain [G89.3]  Yes   • Stage 3a chronic kidney disease (HCC) [N18.31]  Yes   • 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       · Metastatic NSCLC with Cancer Related Pain: Bone  metastases. Undergoing palliative radiation therapy. Has been on Chemotherapy and Xgeva. On Fentanyl, dexamethasone, cymbalta. Oncology following.  · HTN: Acceptable acutely. Continue current regimen.  · Hypothyroidism: Synthroid. TSH slightly low. Was normal range last month. She is on a low dose of synthroid. Would repeat TSH outpatient and continue her current dose for now.  · Thrush: Diflucan/nystatin  · LFT: ALT incresing but others improved. Will repeat to monitor  · Metabolic Encephalopathy  · PPx: Lovenox  · Disposition: TBD    Blaine Mora MD  [facility]  10/01/22  16:11 EDT    Dictated portions using Dragon dictation software.    During the entire encounter, I was wearing recommended PPE including face mask and eye protection. Hand sanitization was performed prior to entering room and upon exit.

## 2022-10-01 NOTE — THERAPY TREATMENT NOTE
Patient Name: Holli Patel  : 1965    MRN: [id number]                              Today's Date: 10/1/2022       Admit Date: 2022    Visit Dx:     ICD-10-CM ICD-9-CM   1. 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)  C34.90 162.9    C79.51 198.5     Patient Active Problem List   Diagnosis   • History of gastroesophageal reflux (GERD)   • Primary insomnia   • Mixed hyperlipidemia   • Other specified hypothyroidism   • Arthritis   • Anxiety   • History of migraine   • Essential hypertension   • History of colon polyps   • Diverticulosis   • Leukocytosis   • Personal history of tobacco use   • Osteopenia   • Dyspnea   • Malignant neoplasm of upper-outer quadrant of right breast in female, estrogen receptor positive (HCC)   • Malignant neoplasm of upper-inner quadrant of left breast in female, estrogen receptor positive (HCC)   • Primary adenocarcinoma of upper lobe of left lung (HCC)   • Encounter for fitting and adjustment of vascular catheter   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)   • 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)   • History of bilateral breast cancer   • History of DVT (deep vein thrombosis)   • Stage 3a chronic kidney disease (HCC)   • Confusion   • Chemotherapy-induced neutropenia (HCC)   • Metabolic acidosis   • Ataxia   • Bony metastasis (HCC)   • Hypopotassemia   • Hypophosphatemia   • Hypomagnesemia   • Cancer related pain     Past Medical History:   Diagnosis Date   • Anxiety    • Clot     POSSIBLE BLOOD CLOT IN VENOUS ACCESS DEVICE-PT STATES WAS STARTED ON ELIQUIS    • Dyspnea on exertion    • Elevated cholesterol    • Frequent urination     DAY AND NIGHT   • SHAYY (generalized anxiety disorder)     RELATED HIGH BLOOD PRESSURE   • GERD (gastroesophageal reflux disease)    • High blood pressure     ANXIETY RELATED   • Hyperlipidemia    • Hypothyroidism    • Lung cancer (HCC)    • Lung nodule     LEFT   • Malignant neoplasm of upper-outer  quadrant of right breast in female, estrogen receptor positive (HCC) 4/13/2021    PT STATES HAS BILAT BREAST CANCER   • Migraine    • PONV (postoperative nausea and vomiting)      Past Surgical History:   Procedure Laterality Date   • BREAST BIOPSY Bilateral 04/07/2021    Right Breast 10 o'clock & Left breast 10 o'clock 6 cm from nipple, [facility]   • CLOSED REDUCTION HIP DISLOCATION Left 4/30/2021    Procedure: BRONCHOSCOPY, LEFT VIDEO ASSITED THORACOSCOPY, ROBOTIC ASSSITED MEDIASTINAL LYMPHADENECTOMY WITH INTERCOSTAL NERVE BLOCKS;  Surgeon: Raphael Kerr III, MD;  Location: [facility];  Service: DaVinci;  Laterality: Left;   • COLONOSCOPY     • TUBAL ABDOMINAL LIGATION     • VENOUS ACCESS DEVICE (PORT) INSERTION Right 5/20/2021    Procedure: INSERTION VENOUS ACCESS DEVICE;  Surgeon: Raphael Kerr III, MD;  Location: [facility];  Service: Thoracic;  Laterality: Right;   • VENOUS ACCESS DEVICE (PORT) INSERTION Right 11/29/2021    Procedure: REVISION AND REPLACEMENT RIGHT SUBCLAVIAN VENOUS ACCESS PORT;  Surgeon: Rahpael Kerr III, MD;  Location: [facility];  Service: Thoracic;  Laterality: Right;   • WRIST SURGERY Right       General Information     Row Name 10/01/22 1721          Physical Therapy Time and Intention    Document Type therapy note (daily note)  -     Mode of Treatment physical therapy  -     Row Name 10/01/22 1721          General Information    Existing Precautions/Restrictions fall  -     Row Name 10/01/22 1721          Cognition    Orientation Status (Cognition) oriented x 3  -           User Key  (r) = Recorded By, (t) = Taken By, (c) = Cosigned By    Initials Name Provider Type     Aura Lomas PTA Physical Therapist Assistant               Mobility     Row Name 10/01/22 1721          Bed Mobility    Bed Mobility supine-sit;sit-supine  -     Supine-Sit [place] (Bed Mobility) supervision  -     Sit-Supine [place] (Bed Mobility) supervision  -      Assistive Device (Bed Mobility) bed rails;head of bed elevated  -     Row Name 10/01/22 1721          Sit-Stand Transfer    Sit-Stand [place] (Transfers) standby assist  -     Assistive Device (Sit-Stand Transfers) walker, front-wheeled  -     Row Name 10/01/22 1721          Gait/Stairs (Locomotion)    [place] Level (Gait) contact guard  -     Assistive Device (Gait) walker, front-wheeled  -     Distance in Feet (Gait) 30  -SM     Deviations/Abnormal Patterns (Gait) yves decreased;stride length decreased  -     Bilateral Gait Deviations forward flexed posture  -           User Key  (r) = Recorded By, (t) = Taken By, (c) = Cosigned By    Initials Name Provider Type    Aura Bess PTA Physical Therapist Assistant               Obj/Interventions    No documentation.                Goals/Plan    No documentation.                Clinical Impression     Row Name 10/01/22 1722          Pain    Pretreatment Pain Rating 3/10  -     Posttreatment Pain Rating 8/10  -     Pain Location lower  -     Pain Location - extremity  -     Pain Intervention(s) Repositioned;Ambulation/increased activity;Rest  -     Row Name 10/01/22 1722          Positioning and Restraints    Pre-Treatment Position in bed  -SM     Post Treatment Position bed  -SM     In Bed supine;call light within reach;encouraged to call for assist;exit alarm on;with nsg  -           User Key  (r) = Recorded By, (t) = Taken By, (c) = Cosigned By    Initials Name Provider Type    Aura Bess PTA Physical Therapist Assistant               Outcome Measures     Row Name 10/01/22 1724          How much help from another person do you currently need...    Turning from your back to your side while in flat bed without using bedrails? 3  -SM     Moving from lying on back to sitting on the side of a flat bed without bedrails? 3  -SM     Moving to and from a bed to a chair (including a wheelchair)? 3  -SM     Standing  up from a chair using your arms (e.g., wheelchair, bedside chair)? 3  -SM     Climbing 3-5 steps with a railing? 2  -SM     To walk in hospital room? 3  -SM     AM-PAC 6 Clicks Score (PT) 17  -     Highest level of mobility 5 --> Static standing  -     Row Name 10/01/22 1724          Functional Assessment    Outcome Measure Options AM-PAC 6 Clicks Basic Mobility (PT)  -           User Key  (r) = Recorded By, (t) = Taken By, (c) = Cosigned By    Initials Name Provider Type     Aura Lomas PTA Physical Therapist Assistant                             Physical Therapy Education                 Title: PT OT SLP Therapies (Done)     Topic: Physical Therapy (Done)     Point: Mobility training (Done)     Learning Progress Summary           Patient Acceptance, E,TB,D, VU,NR by  at 10/1/2022 1724   Family Acceptance, E, VU by  at 9/29/2022 1157      Show all documentation for this point (5)                 Point: Home exercise program (Done)     Learning Progress Summary           Patient Acceptance, E,TB,D, VU,NR by  at 10/1/2022 1724   Family Acceptance, E, VU by  at 9/29/2022 1157      Show all documentation for this point (3)                 Point: Body mechanics (Done)     Learning Progress Summary           Patient Acceptance, E,TB,D, VU,NR by  at 10/1/2022 1724   Family Acceptance, E, VU by  at 9/29/2022 1157      Show all documentation for this point (5)                 Point: Precautions (Done)     Learning Progress Summary           Patient Acceptance, E,TB,D, VU,NR by  at 10/1/2022 1724   Family Acceptance, E, VU by  at 9/29/2022 1157      Show all documentation for this point (5)                             User Key     Initials Effective Dates Name Provider Type Discipline     03/07/18 -  Aura Lomas PTA Physical Therapist Assistant PT    HF 09/22/22 -  Karma Parham RN Registered Nurse Nurse              PT Recommendation and Plan     Plan of Care Reviewed With:  patient  Progress: improving  Outcome Evaluation: Pt tolerated treatment fair this date. Limited overall d/t LE pain. Ambulated 30ft w/ Rw and CGA, unsteady though no overt LOBs noted. Encouraged pt to ambulate w/ nsg during the day.     Time Calculation:    PT Charges     Row Name 10/01/22 1725             Time Calculation    Start Time 1611  -      Stop Time 1622  -      Time Calculation (min) 11 min  -      PT Received On 10/01/22  -      PT - Next Appointment 10/03/22  -            User Key  (r) = Recorded By, (t) = Taken By, (c) = Cosigned By    Initials Name Provider Type     Aura Lomas PTA Physical Therapist Assistant              Therapy Charges for Today     Code Description Service Date Service Provider Modifiers Qty    [id number]  PT THERAPEUTIC ACT EA 15 MIN 10/1/2022 Aura Lomas PTA GP 1          PT G-Codes  Outcome Measure Options: AM-PAC 6 Clicks Basic Mobility (PT)  AM-PAC 6 Clicks Score (PT): 17  AM-PAC 6 Clicks Score (OT): 12    Aura Lomas PTA  10/1/2022

## 2022-10-01 NOTE — PLAN OF CARE
Goal Outcome Evaluation:  Plan of Care Reviewed With: patient           Outcome Evaluation: Pt continues to experience intermittent confusion, reoriented. Pt's husbands states pt has increased hallucinations this morning is from increased fentanyl patch dose. Pt still requiring PRN pain medication Q 4 with increased Fentanyl dose. Pt is up with x2 assist to bedside commode. Port dressing changed yesterday, clean, dry and intact. Bed alarm in place. VSS

## 2022-10-02 ENCOUNTER — APPOINTMENT (OUTPATIENT)
Dept: ULTRASOUND IMAGING | Facility: HOSPITAL | Age: 57
End: 2022-10-02

## 2022-10-02 LAB
ALBUMIN SERPL-MCNC: 3.6 G/DL (ref 3.5–5.2)
ALBUMIN/GLOB SERPL: 1.8 G/DL
ALP SERPL-CCNC: 117 U/L (ref 39–117)
ALT SERPL W P-5'-P-CCNC: 105 U/L (ref 1–33)
ANION GAP SERPL CALCULATED.3IONS-SCNC: 10 MMOL/L (ref 5–15)
AST SERPL-CCNC: 39 U/L (ref 1–32)
BASOPHILS # BLD AUTO: 0.03 10*3/MM3 (ref 0–0.2)
BASOPHILS NFR BLD AUTO: 0.2 % (ref 0–1.5)
BILIRUB SERPL-MCNC: 0.3 MG/DL (ref 0–1.2)
BUN SERPL-MCNC: 22 MG/DL (ref 6–20)
BUN/CREAT SERPL: 24.7 (ref 7–25)
CALCIUM SPEC-SCNC: 9 MG/DL (ref 8.6–10.5)
CHLORIDE SERPL-SCNC: 102 MMOL/L (ref 98–107)
CO2 SERPL-SCNC: 28 MMOL/L (ref 22–29)
CREAT SERPL-MCNC: 0.89 MG/DL (ref 0.57–1)
DEPRECATED RDW RBC AUTO: 54.6 FL (ref 37–54)
EGFRCR SERPLBLD CKD-EPI 2021: 74.8 ML/MIN/1.73
EOSINOPHIL # BLD AUTO: 0 10*3/MM3 (ref 0–0.4)
EOSINOPHIL NFR BLD AUTO: 0 % (ref 0.3–6.2)
ERYTHROCYTE [DISTWIDTH] IN BLOOD BY AUTOMATED COUNT: 17.1 % (ref 12.3–15.4)
GLOBULIN UR ELPH-MCNC: 2 GM/DL
GLUCOSE SERPL-MCNC: 116 MG/DL (ref 65–99)
HCT VFR BLD AUTO: 30.5 % (ref 34–46.6)
HGB BLD-MCNC: 9.6 G/DL (ref 12–15.9)
IMM GRANULOCYTES # BLD AUTO: 0.58 10*3/MM3 (ref 0–0.05)
IMM GRANULOCYTES NFR BLD AUTO: 3.4 % (ref 0–0.5)
LYMPHOCYTES # BLD AUTO: 0.24 10*3/MM3 (ref 0.7–3.1)
LYMPHOCYTES NFR BLD AUTO: 1.4 % (ref 19.6–45.3)
MCH RBC QN AUTO: 28.2 PG (ref 26.6–33)
MCHC RBC AUTO-ENTMCNC: 31.5 G/DL (ref 31.5–35.7)
MCV RBC AUTO: 89.7 FL (ref 79–97)
MONOCYTES # BLD AUTO: 0.75 10*3/MM3 (ref 0.1–0.9)
MONOCYTES NFR BLD AUTO: 4.4 % (ref 5–12)
NEUTROPHILS NFR BLD AUTO: 15.38 10*3/MM3 (ref 1.7–7)
NEUTROPHILS NFR BLD AUTO: 90.6 % (ref 42.7–76)
NRBC BLD AUTO-RTO: 0.1 /100 WBC (ref 0–0.2)
PHOSPHATE SERPL-MCNC: 4.1 MG/DL (ref 2.5–4.5)
PLATELET # BLD AUTO: 189 10*3/MM3 (ref 140–450)
PMV BLD AUTO: 9.9 FL (ref 6–12)
POTASSIUM SERPL-SCNC: 4.9 MMOL/L (ref 3.5–5.2)
PROT SERPL-MCNC: 5.6 G/DL (ref 6–8.5)
RBC # BLD AUTO: 3.4 10*6/MM3 (ref 3.77–5.28)
SODIUM SERPL-SCNC: 140 MMOL/L (ref 136–145)
WBC NRBC COR # BLD: 16.98 10*3/MM3 (ref 3.4–10.8)

## 2022-10-02 PROCEDURE — 25010000002 HYDROMORPHONE PER 4 MG: Performed by: INTERNAL MEDICINE

## 2022-10-02 PROCEDURE — 25010000002 ENOXAPARIN PER 10 MG: Performed by: INTERNAL MEDICINE

## 2022-10-02 PROCEDURE — 63710000001 DEXAMETHASONE PER 0.25 MG: Performed by: INTERNAL MEDICINE

## 2022-10-02 PROCEDURE — 84100 ASSAY OF PHOSPHORUS: CPT | Performed by: INTERNAL MEDICINE

## 2022-10-02 PROCEDURE — 99233 SBSQ HOSP IP/OBS HIGH 50: CPT | Performed by: INTERNAL MEDICINE

## 2022-10-02 PROCEDURE — 85025 COMPLETE CBC W/AUTO DIFF WBC: CPT | Performed by: INTERNAL MEDICINE

## 2022-10-02 PROCEDURE — 76705 ECHO EXAM OF ABDOMEN: CPT

## 2022-10-02 PROCEDURE — 80053 COMPREHEN METABOLIC PANEL: CPT | Performed by: INTERNAL MEDICINE

## 2022-10-02 RX ORDER — DEXAMETHASONE 4 MG/1
4 TABLET ORAL
Status: DISCONTINUED | OUTPATIENT
Start: 2022-10-03 | End: 2022-10-13 | Stop reason: HOSPADM

## 2022-10-02 RX ORDER — AMLODIPINE BESYLATE 2.5 MG/1
2.5 TABLET ORAL
Status: DISCONTINUED | OUTPATIENT
Start: 2022-10-03 | End: 2022-10-08

## 2022-10-02 RX ORDER — FENTANYL 50 UG/H
1 PATCH TRANSDERMAL
Status: DISCONTINUED | OUTPATIENT
Start: 2022-10-02 | End: 2022-10-03

## 2022-10-02 RX ORDER — ALPRAZOLAM 0.5 MG/1
0.5 TABLET ORAL NIGHTLY PRN
Status: DISCONTINUED | OUTPATIENT
Start: 2022-10-02 | End: 2022-10-13 | Stop reason: HOSPADM

## 2022-10-02 RX ADMIN — NYSTATIN 500000 UNITS: 100000 SUSPENSION ORAL at 08:42

## 2022-10-02 RX ADMIN — HYDROMORPHONE HYDROCHLORIDE 0.5 MG: 1 INJECTION, SOLUTION INTRAMUSCULAR; INTRAVENOUS; SUBCUTANEOUS at 12:16

## 2022-10-02 RX ADMIN — NYSTATIN 500000 UNITS: 100000 SUSPENSION ORAL at 14:09

## 2022-10-02 RX ADMIN — DEXAMETHASONE 4 MG: 4 TABLET ORAL at 08:41

## 2022-10-02 RX ADMIN — Medication 10 ML: at 21:40

## 2022-10-02 RX ADMIN — OXYCODONE HYDROCHLORIDE 15 MG: 15 TABLET ORAL at 17:48

## 2022-10-02 RX ADMIN — Medication 2 PACKET: at 08:41

## 2022-10-02 RX ADMIN — OXYCODONE HYDROCHLORIDE 15 MG: 15 TABLET ORAL at 21:44

## 2022-10-02 RX ADMIN — ALPRAZOLAM 0.5 MG: 0.5 TABLET ORAL at 21:41

## 2022-10-02 RX ADMIN — OXYCODONE HYDROCHLORIDE 15 MG: 15 TABLET ORAL at 06:14

## 2022-10-02 RX ADMIN — PANTOPRAZOLE SODIUM 40 MG: 40 TABLET, DELAYED RELEASE ORAL at 06:14

## 2022-10-02 RX ADMIN — Medication 10 ML: at 08:43

## 2022-10-02 RX ADMIN — LEVOTHYROXINE SODIUM 25 MCG: 0.03 TABLET ORAL at 06:14

## 2022-10-02 RX ADMIN — OXYCODONE HYDROCHLORIDE 15 MG: 15 TABLET ORAL at 10:22

## 2022-10-02 RX ADMIN — BUPROPION HYDROCHLORIDE 150 MG: 150 TABLET, EXTENDED RELEASE ORAL at 08:42

## 2022-10-02 RX ADMIN — ENOXAPARIN SODIUM 40 MG: 100 INJECTION SUBCUTANEOUS at 15:51

## 2022-10-02 RX ADMIN — AMLODIPINE BESYLATE 5 MG: 5 TABLET ORAL at 08:42

## 2022-10-02 RX ADMIN — OXYCODONE HYDROCHLORIDE 15 MG: 15 TABLET ORAL at 02:08

## 2022-10-02 RX ADMIN — Medication 1000 MCG: at 08:42

## 2022-10-02 RX ADMIN — LETROZOLE 2.5 MG: 2.5 TABLET, FILM COATED ORAL at 08:42

## 2022-10-02 RX ADMIN — NYSTATIN 500000 UNITS: 100000 SUSPENSION ORAL at 17:48

## 2022-10-02 RX ADMIN — DOCUSATE SODIUM 50MG AND SENNOSIDES 8.6MG 2 TABLET: 8.6; 5 TABLET, FILM COATED ORAL at 21:24

## 2022-10-02 RX ADMIN — FENTANYL 1 PATCH: 50 PATCH TRANSDERMAL at 21:41

## 2022-10-02 RX ADMIN — CALCITRIOL 1 MCG: 0.25 CAPSULE ORAL at 08:42

## 2022-10-02 RX ADMIN — DOCUSATE SODIUM 50MG AND SENNOSIDES 8.6MG 2 TABLET: 8.6; 5 TABLET, FILM COATED ORAL at 08:41

## 2022-10-02 RX ADMIN — Medication 2 PACKET: at 17:48

## 2022-10-02 RX ADMIN — ACETAMINOPHEN 650 MG: 325 TABLET, FILM COATED ORAL at 15:02

## 2022-10-02 RX ADMIN — POLYETHYLENE GLYCOL 3350 17 G: 17 POWDER, FOR SOLUTION ORAL at 08:43

## 2022-10-02 NOTE — PROGRESS NOTES
Subjective     CHIEF COMPLAINT:     Metastatic non-small cell lung cancer  Cancer-related pain    INTERVAL HISTORY:     Patient had multiple falls.   states that she thought she needed to go to the bathroom.      Patient remains confused. She complains of leg pain. No pain in the back.     REVIEW OF SYSTEMS:  A comprehensive review of systems was obtained with pertinent positive findings as noted in the interval history above.  All other systems negative.    SCHEDULED MEDS:  amLODIPine, 5 mg, Oral, Q24H  buPROPion XL, 150 mg, Oral, Daily  calcitriol, 1 mcg, Oral, Daily  fentaNYL, 1 patch, Transdermal, Q72H   And  Check Fentanyl Patch Placement, 1 each, Does not apply, Q12H  dexamethasone, 4 mg, Oral, Q12H  DULoxetine, 30 mg, Oral, Nightly  [START ON 10/3/2022] DULoxetine, 60 mg, Oral, Nightly  enoxaparin, 40 mg, Subcutaneous, Q24H  folic acid, 1,000 mcg, Oral, Daily  letrozole, 2.5 mg, Oral, Daily  levothyroxine, 25 mcg, Oral, Q AM  nystatin, 5 mL, Swish & Swallow, 4x Daily  pantoprazole, 40 mg, Oral, QAM  polyethylene glycol, 17 g, Oral, Daily   And  senna-docusate sodium, 2 tablet, Oral, BID  potassium & sodium phosphates, 2 packet, Oral, BID AC  sodium chloride, 10 mL, Intravenous, Q12H      PRN MEDS:  •  acetaminophen **OR** acetaminophen **OR** acetaminophen  •  senna-docusate sodium **AND** polyethylene glycol **AND** bisacodyl **AND** bisacodyl  •  heparin  •  hydrALAZINE  •  HYDROmorphone  •  magnesium sulfate **OR** magnesium sulfate **OR** magnesium sulfate  •  ondansetron  •  oxyCODONE  •  potassium & sodium phosphates **OR** potassium & sodium phosphates  •  potassium chloride **OR** potassium chloride **OR** potassium chloride  •  prochlorperazine  •  sodium chloride  •  sodium chloride  •  temazepam    Objective   VITAL SIGNS:  Temp:  [97 °F (36.1 °C)-98.5 °F (36.9 °C)] 97 °F (36.1 °C)  Heart Rate:  [] 101  Resp:  [16-18] 18  BP: (124-139)/(81-93) 139/93     PHYSICAL EXAMINATION:     GENERAL: Awake but confused.   SKIN: No skin rash. No ecchymosis.   HEAD:  Normocephalic.  EYES: Pallor. No jaundice.  NECK:  Supple. No Masses.  CHEST: Normal respiratory effort. Lungs clear.   CARDIAC: Normal S1 and S2. No murmurs.   ABDOMEN:  Soft. No tenderness.   EXTREMITIES: No edema. No calf tenderness.    RESULT REVIEW:   Results from last 7 days   Lab Units 10/02/22  0622 10/01/22  0531 09/30/22  0908 09/29/22  0636 09/28/22  0502 09/27/22  0621 09/26/22  0550   WBC 10*3/mm3 16.98* 17.94* 18.37* 18.36* 18.65* 18.43* 17.91*   NEUTROS ABS 10*3/mm3 15.38* 16.48* 17.27* 17.41* 17.53* 17.69* 16.08*   LYMPHS ABS 10*3/mm3  --   --  0.92 0.18* 0.19* 0.18* 0.00*   HEMOGLOBIN g/dL 9.6* 9.3* 9.9* 9.1* 9.1* 8.7* 8.2*   HEMATOCRIT % 30.5* 30.1* 32.2* 28.6* 28.2* 27.1* 26.6*   PLATELETS 10*3/mm3 189 186 206 208 221 220 229     Results from last 7 days   Lab Units 10/02/22  0622 10/01/22  0531 09/30/22  0908 09/29/22  0636 09/28/22  0502 09/26/22  1836 09/26/22  0550   SODIUM mmol/L 140 138 140 142 141  --  140   POTASSIUM mmol/L 4.9 4.4 4.5 4.9 4.7  --  4.4   CHLORIDE mmol/L 102 103 104 106 104  --  107   CO2 mmol/L 28.0 26.5 24.5 29.7* 30.4*  --  25.8   BUN mg/dL 22* 20 20 20 19  --  19   CREATININE mg/dL 0.89 0.89 0.95 0.81 0.90  --  0.97   CALCIUM mg/dL 9.0 8.3* 8.5* 8.1* 8.3*  --  8.2*   ALBUMIN g/dL 3.60 3.70 3.90 3.70 3.60  --  3.20*   BILIRUBIN mg/dL 0.3 0.3 0.4 0.3 0.3  --   --    ALK PHOS U/L 117 114 119* 132* 130*  --   --    ALT (SGPT) U/L 105* 78* 65* 59* 18  --   --    AST (SGOT) U/L 39* 35* 43* 40* 18  --   --    MAGNESIUM mg/dL  --   --   --   --   --  2.6 2.4     Assessment & Plan   *Metastatic non-small cell lung cancer  · She was initially diagnosed with stage III (T1N2M0) disease.    · She received initial therapy with cisplatin and Alimta concurrent with radiation therapy beginning 5/25/2021.    · There were indeterminate findings in the lumbar spine at L1 and it was recommended to monitor this over  time.    · Patient completed 3 cycles cisplatin and Alimta 7/7/2021 and completed radiation therapy 7/12/2021.    · PET scan 8/6/2021 showed good response to treatment.    · MRI lumbar spine 10/1/2021 with increase in size of the L1 lesion.    · Biopsy of the L1 lesion on 10/14/2022 confirmed metastatic adenocarcinoma of lung origin, PD-L1 TPS 0.    · PET scan 10/26/2021 with involvement of left adrenal and L1/L2 only.    · Patient received radiation therapy to L1/L2 on 11/19/2021.  She also received radiation to the left adrenal gland.    · She initiated further systemic therapy on 11/22/2021 with Keytruda and Alimta.    · Follow-up PET scan 1/18/2022 with decrease in small left upper lobe pulmonary nodule, resolution of activity at L1/L2, no new sites of disease.    · Guardant 360 CT DNA level was monitored and was decreasing.    · PET scan 4/11/2022 with progression in left adrenal and L1.    · Maintained systemic therapy with Keytruda and Alimta and received additional radiation to left adrenal and L1.  Alimta however held since 5/10/2022 due to renal dysfunction.  Radiation completed to lumbar spine 7/14/2022.    · PET scan 7/18/2022 with multiple areas of progression of the spine and right sacrum.    · Guardant 360 analysis 7/20/2022 with no actionable mutations.    · Change in therapy to single agent palliative Taxotere initiated 7/26/2022.    · Altered mental status and febrile neutropenia requiring hospitalization 8/2 - 8/9/2022.  During that admission, MRI brain, cervical, thoracic, lumbar spine performed 8/4/2022 in addition to CT cervical spine 8/5/2022.  There was evidence of C2 metastatic lesion with some dural enhancement, compression deformity at T7 with mild edema suggesting acute to subacute fracture, 5 mm T12 spinous process metastasis, multifocal disease in the lumbar spine and sacrum, largest involving sacral ala to the right 5 cm in transverse dimension.  Loss of height at L2 25%.  · Patient  received cycle 3 Taxotere 9/6/2022 with Neulasta support.    · She has been continuing as well on Xgeva every 4 weeks (last received 9/6/2022).  · Patient did undergo MRI lumbar spine and pelvis on 9/24/2022.  MRI pelvis showed bilateral sacral fractures with marrow infiltration related to metastasis more prominent on the right.  Lesion on the right 3.5 x 4.1 x 4.3 cm.  Also probable metastasis along posterior acetabulum on the right 1.5 cm.  MRI lumbar spine with stable L1 metastasis, new edema endplate T12 possibly stress reaction versus developing new metastasis, lesion at T12 spinous process unchanged.   · Radiation oncology consulted with plans for palliative treatment of the right sacral metastasis.  Treatment initiated 9/28/2022  · Patient was going to have a PET scan which was delayed after she was admitted.     *Bilateral stage I breast cancer  · Patient with bilateral breast cancer, both stage I ([id number]), grade 2, ER/LA positive and HER2/melinda negative with low Ki-67.    · Patient initiated adjuvant letrozole in May 2021.  · Patient continues to take letrozole daily.     *Cancer related pain  · Patient has been receiving Duragesic patch 50 mcg every 72 hours in addition to oxycodone 15 mg every 4 hours for breakthrough pain.  Duragesic dose had been escalated recently in the outpatient setting due to worsening pain  · On admission 9/23/2022, Duragesic patch increased to 75 mcg daily, added Decadron 4 mg IV every 6 hours with continuation of oxycodone 15 mg every 4 hours as needed for breakthrough pain in addition of Dilaudid 1 mg every 2 hours as needed for breakthrough pain.  · Patient with increasing side effects from narcotics, Duragesic decreased on 9/25/2022 from 75 down to 50 mcg patch every 72 hours.  · Dexamethasone dose decreased to 4 mg p.o. every 12 hours on 9/27/2022  · Initiated palliative radiation to the right sacrum on 9/28/2022  · Patient became confused after receiving Dilaudid.  Patient  and family asked to discontinue Dilaudid.  · Duragesic patch dose was increased to 75 mcg/h on 9/30/2022.  · Gabapentin was discontinued due to the sedating effect.  · Oxycodone is being used for breakthrough pain.     *Anxiety/depression  · Patient was previously on Wellbutrin  mg daily, Lexapro 10 mg daily.  · On 9/30/2022, she was switched from Lexapro to Cymbalta 30 mg daily.    *Difficulty with sleep.  · Patient was previously on Xanax but reported that it was not working.  · Patient previously tried melatonin and it was not effective.  · She was placed on Restoril 15 mg nightly on 10/1/2022.  · The patient had 3 falls since last night.  · Its not clear if this is due to Restoril but since this happened after the change, we will switch back to Xanax-Home dose of 0.5 mg as needed for sleep.     *Anemia  · Most recent evaluation 8/3/2022 with iron 15, ferritin 276, iron saturation 5%, TIBC 328, folate greater than 20, B12 392  · Anemia felt to be related to malignancy/chronic disease and chemotherapy  · Hemoglobin has remained in the 8-10 range  · Hemoglobin is 9.3 today.     *Peripheral neuropathy  · Patient was on gabapentin 300 mg twice daily.  · Gabapentin was discontinued on 9/30/2022.  · She is currently on Cymbalta.     *Narcotic induced constipation  · Patient is on Sybil-Colace 2 tablets twice daily with MiraLAX daily   · Patient is now having bowel movements.     *GI prophylaxis  · Protonix 40 mg daily     *DVT prophylaxis  · Lovenox 40 mg daily     *Elevated LFTs  · Labs today with new elevation in LFTs with ALT 59, AST 40  · Atorvastatin was discontinued  · ?  Due to Diflucan (given to treat thrush).    · AST is 39 and ALT is 105 today.     PLAN:    I had a lengthy discussion with the patient's  regarding her current status and that she is having side effects related to the pain medicines as well as the other medicines that are given to help with the pain. The plan made based on the  discussion is as follows:  1.  Reduce Duragesic patch back to 50 mcg/hr changed every 72 hours.  2.  Reduce Dexamethasone to 4 mg once daily in AM.   3.  Switch back from Restoril to Xanax 0.5 mg as needed at bedtime for insomnia.  4.  We will keep Cymbalta at the current dose of 30 mg daily.   5.  We will reduce use of PRN Oxycodone unless pain is severe to avoid over sedation.   6.  Reduce Norvasc dose due to concern that BP is going to drop after reducing the dose of Dexamethasone.         Demetrice Bhatt MD  10/02/22

## 2022-10-02 NOTE — NURSING NOTE
During shift report, RNs responded to patient's bed alarm. Upon entry, pt was sitting on the floor leaned against the side of the bed. Pt reported she was trying to get up but should've called. Pt denies any injuries or hitting her head.     RN paged MD regarding patient's fall this am. Awaiting callback.    RN attempted to call  to notify but he didn't answer. Left a message and requested a call back.  0830- Spoke with  and updated him on patient's room change.

## 2022-10-02 NOTE — PROGRESS NOTES
Name: Holli Patel ADMIT: 2022   : 1965  PCP: Vandana Gastelum MD    MRN: [id number] LOS: 9 days   AGE/SEX: 57 y.o. female  ROOM: [facility]   Subjective   No chief complaint on file.     Confused. Fell twice overnight. Family in room reports she had hallucination where she saw family in room who was not present. She has been thinking she needs to get up to use restroom despite redirection about the purewick. No HA or neck pain. No CP SOA NVD. Pain level is currently ok. Family did say she fell asleep better with the restoril. They are concerned about the cymbalta and increased fentanyl dosing. I discussed consideration of antipsychotic with the halucination which would also aid with sleep and maybe stopping the restoril. She thinks she tool trazodone in the past and did not like it. I mentioned possible seroquel trial. Family is considering and plans to discuss further with oncology on rounds today.    Objective   Vital Signs  Temp:  [97 °F (36.1 °C)-98.5 °F (36.9 °C)] 97.9 °F (36.6 °C)  Heart Rate:  [] 98  Resp:  [16-18] 18  BP: (124-139)/(81-93) 126/91  SpO2:  [95 %-99 %] 98 %  on   ;   Device (Oxygen Therapy): room air  Body mass index is 28.12 kg/m².    Physical Exam  Vitals and nursing note reviewed.   Constitutional:       General: She is not in acute distress.     Appearance: She is ill-appearing. She is not diaphoretic.   HENT:      Head: Normocephalic and atraumatic.   Eyes:      General:         Right eye: No discharge.         Left eye: No discharge.      Conjunctiva/sclera: Conjunctivae normal.   Cardiovascular:      Rate and Rhythm: Normal rate and regular rhythm.      Pulses: Normal pulses.   Pulmonary:      Effort: Pulmonary effort is normal.      Breath sounds: No wheezing.   Abdominal:      General: There is no distension.      Palpations: Abdomen is soft.      Tenderness: There is no abdominal tenderness. There is no guarding or rebound.   Musculoskeletal:         General:  Tenderness present. No swelling.      Cervical back: Neck supple. No tenderness.   Skin:     General: Skin is warm and dry.   Neurological:      Mental Status: She is alert.      Cranial Nerves: No cranial nerve deficit.   Psychiatric:         Attention and Perception: She is inattentive.         Mood and Affect: Mood normal.         Behavior: Behavior normal.     I have reviewed the physical exam today and updated where needed as above.      Results Review:       I reviewed the patient's new clinical results.    Results from last 7 days   Lab Units 10/02/22  0622 10/01/22  0531 09/30/22  0908 09/29/22  0636   WBC 10*3/mm3 16.98* 17.94* 18.37* 18.36*   HEMOGLOBIN g/dL 9.6* 9.3* 9.9* 9.1*   PLATELETS 10*3/mm3 189 186 206 208     Results from last 7 days   Lab Units 10/02/22  0622 10/01/22  0531 09/30/22  0908 09/29/22  0636   SODIUM mmol/L 140 138 140 142   POTASSIUM mmol/L 4.9 4.4 4.5 4.9   CHLORIDE mmol/L 102 103 104 106   CO2 mmol/L 28.0 26.5 24.5 29.7*   BUN mg/dL 22* 20 20 20   CREATININE mg/dL 0.89 0.89 0.95 0.81   GLUCOSE mg/dL 116* 104* 175* 117*   Estimated Creatinine Clearance: 64.7 mL/min (by C-G formula based on SCr of 0.89 mg/dL).  Results from last 7 days   Lab Units 10/02/22  0622 10/01/22  0531 09/30/22  0908 09/29/22  0636 09/28/22  0502 09/26/22  1836 09/26/22  0550   CALCIUM mg/dL 9.0 8.3* 8.5* 8.1*   < >  --  8.2*   ALBUMIN g/dL 3.60 3.70 3.90 3.70   < >  --  3.20*   MAGNESIUM mg/dL  --   --   --   --   --  2.6 2.4   PHOSPHORUS mg/dL 4.1  --   --   --   --  2.5 1.7*    < > = values in this interval not displayed.          amLODIPine, 5 mg, Oral, Q24H  buPROPion XL, 150 mg, Oral, Daily  calcitriol, 1 mcg, Oral, Daily  fentaNYL, 1 patch, Transdermal, Q72H   And  Check Fentanyl Patch Placement, 1 each, Does not apply, Q12H  dexamethasone, 4 mg, Oral, Q12H  DULoxetine, 30 mg, Oral, Nightly  [START ON 10/3/2022] DULoxetine, 60 mg, Oral, Nightly  enoxaparin, 40 mg, Subcutaneous, Q24H  folic acid, 1,000  mcg, Oral, Daily  letrozole, 2.5 mg, Oral, Daily  levothyroxine, 25 mcg, Oral, Q AM  nystatin, 5 mL, Swish & Swallow, 4x Daily  pantoprazole, 40 mg, Oral, QAM  polyethylene glycol, 17 g, Oral, Daily   And  senna-docusate sodium, 2 tablet, Oral, BID  potassium & sodium phosphates, 2 packet, Oral, BID AC  sodium chloride, 10 mL, Intravenous, Q12H       Diet Regular; Cardiac    Assessment & Plan      Active Hospital Problems    Diagnosis  POA   • **Cancer related pain [G89.3]  Yes   • Stage 3a chronic kidney disease (HCC) [N18.31]  Yes   • 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       · Metastatic NSCLC with Cancer Related Pain: Bone metastases. Undergoing palliative radiation therapy. Has been on Chemotherapy and Xgeva. On Fentanyl, dexamethasone, cymbalta. Oncology following.  · HTN: Acceptable acutely. Continue current regimen.  · Hypothyroidism: Synthroid. TSH slightly low. Going to repeat TSH in am to confirm and if mental status issues continue and TSH remains suppressed, will lower her dose.  · Thrush: Diflucan/nystatin  · LFT: ALT incresing but others improved. Check Liver US/Hepatitis panel/ammonia level.  · Metabolic Encephalopathy  · Insomnia/Hallucination: Hallucination was prior to restoril yesterday. She had falls last night/early this morning but did get some better sleep with the restoril. QTc ok. Discussed possible seroquel trial with them and stopping the restoril. They were going to discuss further with oncology today on rounds.  · PPx: Lovenox  · Disposition: TBD    Blaine Mora MD  [facility]  10/02/22  10:52 EDT    Dictated portions using Dragon dictation software.    During the entire encounter, I was wearing recommended PPE including face mask and eye  protection. Hand sanitization was performed prior to entering room and upon exit.

## 2022-10-02 NOTE — PLAN OF CARE
Goal Outcome Evaluation:  Plan of Care Reviewed With: patient           Outcome Evaluation: Pt in bed, PRN Restoril given overnight, some improvement in sleep pattern noted. PRN pain medication given x3 times overnight see MAR. Bed alarm on, x3 side rails up. VSS

## 2022-10-03 ENCOUNTER — APPOINTMENT (OUTPATIENT)
Dept: CARDIOLOGY | Facility: HOSPITAL | Age: 57
End: 2022-10-03

## 2022-10-03 ENCOUNTER — TREATMENT (OUTPATIENT)
Dept: RADIATION ONCOLOGY | Facility: HOSPITAL | Age: 57
End: 2022-10-03

## 2022-10-03 LAB
ALBUMIN SERPL-MCNC: 3.5 G/DL (ref 3.5–5.2)
ALBUMIN/GLOB SERPL: 1.8 G/DL
ALP SERPL-CCNC: 109 U/L (ref 39–117)
ALT SERPL W P-5'-P-CCNC: 97 U/L (ref 1–33)
AMMONIA BLD-SCNC: 12 UMOL/L (ref 11–51)
ANION GAP SERPL CALCULATED.3IONS-SCNC: 10.7 MMOL/L (ref 5–15)
AST SERPL-CCNC: 35 U/L (ref 1–32)
BASOPHILS # BLD AUTO: 0.02 10*3/MM3 (ref 0–0.2)
BASOPHILS NFR BLD AUTO: 0.1 % (ref 0–1.5)
BH CV LOWER VASCULAR LEFT COMMON FEMORAL AUGMENT: NORMAL
BH CV LOWER VASCULAR LEFT COMMON FEMORAL COMPETENT: NORMAL
BH CV LOWER VASCULAR LEFT COMMON FEMORAL COMPRESS: NORMAL
BH CV LOWER VASCULAR LEFT COMMON FEMORAL PHASIC: NORMAL
BH CV LOWER VASCULAR LEFT COMMON FEMORAL SPONT: NORMAL
BH CV LOWER VASCULAR LEFT DISTAL FEMORAL COMPRESS: NORMAL
BH CV LOWER VASCULAR LEFT GASTRONEMIUS COMPRESS: NORMAL
BH CV LOWER VASCULAR LEFT GREATER SAPH AK COMPRESS: NORMAL
BH CV LOWER VASCULAR LEFT GREATER SAPH BK COMPRESS: NORMAL
BH CV LOWER VASCULAR LEFT LESSER SAPH COMPRESS: NORMAL
BH CV LOWER VASCULAR LEFT MID FEMORAL AUGMENT: NORMAL
BH CV LOWER VASCULAR LEFT MID FEMORAL COMPETENT: NORMAL
BH CV LOWER VASCULAR LEFT MID FEMORAL COMPRESS: NORMAL
BH CV LOWER VASCULAR LEFT MID FEMORAL PHASIC: NORMAL
BH CV LOWER VASCULAR LEFT MID FEMORAL SPONT: NORMAL
BH CV LOWER VASCULAR LEFT PERONEAL COMPRESS: NORMAL
BH CV LOWER VASCULAR LEFT POPLITEAL AUGMENT: NORMAL
BH CV LOWER VASCULAR LEFT POPLITEAL COMPETENT: NORMAL
BH CV LOWER VASCULAR LEFT POPLITEAL COMPRESS: NORMAL
BH CV LOWER VASCULAR LEFT POPLITEAL PHASIC: NORMAL
BH CV LOWER VASCULAR LEFT POPLITEAL SPONT: NORMAL
BH CV LOWER VASCULAR LEFT POSTERIOR TIBIAL COMPRESS: NORMAL
BH CV LOWER VASCULAR LEFT PROFUNDA FEMORAL COMPRESS: NORMAL
BH CV LOWER VASCULAR LEFT PROXIMAL FEMORAL COMPRESS: NORMAL
BH CV LOWER VASCULAR LEFT SAPHENOFEMORAL JUNCTION COMPRESS: NORMAL
BH CV LOWER VASCULAR RIGHT COMMON FEMORAL AUGMENT: NORMAL
BH CV LOWER VASCULAR RIGHT COMMON FEMORAL COMPETENT: NORMAL
BH CV LOWER VASCULAR RIGHT COMMON FEMORAL COMPRESS: NORMAL
BH CV LOWER VASCULAR RIGHT COMMON FEMORAL PHASIC: NORMAL
BH CV LOWER VASCULAR RIGHT COMMON FEMORAL SPONT: NORMAL
BH CV LOWER VASCULAR RIGHT DISTAL FEMORAL COMPRESS: NORMAL
BH CV LOWER VASCULAR RIGHT GASTRONEMIUS COMPRESS: NORMAL
BH CV LOWER VASCULAR RIGHT GREATER SAPH AK COMPRESS: NORMAL
BH CV LOWER VASCULAR RIGHT GREATER SAPH BK COMPRESS: NORMAL
BH CV LOWER VASCULAR RIGHT LESSER SAPH COMPRESS: NORMAL
BH CV LOWER VASCULAR RIGHT MID FEMORAL AUGMENT: NORMAL
BH CV LOWER VASCULAR RIGHT MID FEMORAL COMPETENT: NORMAL
BH CV LOWER VASCULAR RIGHT MID FEMORAL COMPRESS: NORMAL
BH CV LOWER VASCULAR RIGHT MID FEMORAL PHASIC: NORMAL
BH CV LOWER VASCULAR RIGHT MID FEMORAL SPONT: NORMAL
BH CV LOWER VASCULAR RIGHT PERONEAL COMPRESS: NORMAL
BH CV LOWER VASCULAR RIGHT POPLITEAL AUGMENT: NORMAL
BH CV LOWER VASCULAR RIGHT POPLITEAL COMPETENT: NORMAL
BH CV LOWER VASCULAR RIGHT POPLITEAL COMPRESS: NORMAL
BH CV LOWER VASCULAR RIGHT POPLITEAL PHASIC: NORMAL
BH CV LOWER VASCULAR RIGHT POPLITEAL SPONT: NORMAL
BH CV LOWER VASCULAR RIGHT POSTERIOR TIBIAL COMPRESS: NORMAL
BH CV LOWER VASCULAR RIGHT PROFUNDA FEMORAL COMPRESS: NORMAL
BH CV LOWER VASCULAR RIGHT PROXIMAL FEMORAL COMPRESS: NORMAL
BH CV LOWER VASCULAR RIGHT SAPHENOFEMORAL JUNCTION COMPRESS: NORMAL
BILIRUB SERPL-MCNC: 0.3 MG/DL (ref 0–1.2)
BUN SERPL-MCNC: 21 MG/DL (ref 6–20)
BUN/CREAT SERPL: 25.6 (ref 7–25)
CALCIUM SPEC-SCNC: 8.7 MG/DL (ref 8.6–10.5)
CHLORIDE SERPL-SCNC: 101 MMOL/L (ref 98–107)
CO2 SERPL-SCNC: 28.3 MMOL/L (ref 22–29)
CREAT SERPL-MCNC: 0.82 MG/DL (ref 0.57–1)
DEPRECATED RDW RBC AUTO: 52.5 FL (ref 37–54)
EGFRCR SERPLBLD CKD-EPI 2021: 82.5 ML/MIN/1.73
EOSINOPHIL # BLD AUTO: 0 10*3/MM3 (ref 0–0.4)
EOSINOPHIL NFR BLD AUTO: 0 % (ref 0.3–6.2)
ERYTHROCYTE [DISTWIDTH] IN BLOOD BY AUTOMATED COUNT: 16.8 % (ref 12.3–15.4)
GLOBULIN UR ELPH-MCNC: 2 GM/DL
GLUCOSE SERPL-MCNC: 77 MG/DL (ref 65–99)
HAV IGM SERPL QL IA: NORMAL
HBV CORE IGM SERPL QL IA: NORMAL
HBV SURFACE AG SERPL QL IA: NORMAL
HCT VFR BLD AUTO: 28.8 % (ref 34–46.6)
HCV AB SER DONR QL: NORMAL
HGB BLD-MCNC: 9.3 G/DL (ref 12–15.9)
IMM GRANULOCYTES # BLD AUTO: 0.52 10*3/MM3 (ref 0–0.05)
IMM GRANULOCYTES NFR BLD AUTO: 3.2 % (ref 0–0.5)
LYMPHOCYTES # BLD AUTO: 0.24 10*3/MM3 (ref 0.7–3.1)
LYMPHOCYTES NFR BLD AUTO: 1.5 % (ref 19.6–45.3)
MAGNESIUM SERPL-MCNC: 1.8 MG/DL (ref 1.6–2.6)
MAXIMAL PREDICTED HEART RATE: 161 BPM
MCH RBC QN AUTO: 27.9 PG (ref 26.6–33)
MCHC RBC AUTO-ENTMCNC: 32.3 G/DL (ref 31.5–35.7)
MCV RBC AUTO: 86.5 FL (ref 79–97)
MONOCYTES # BLD AUTO: 1.21 10*3/MM3 (ref 0.1–0.9)
MONOCYTES NFR BLD AUTO: 7.4 % (ref 5–12)
NEUTROPHILS NFR BLD AUTO: 14.34 10*3/MM3 (ref 1.7–7)
NEUTROPHILS NFR BLD AUTO: 87.8 % (ref 42.7–76)
NRBC BLD AUTO-RTO: 0.1 /100 WBC (ref 0–0.2)
PHOSPHATE SERPL-MCNC: 3.4 MG/DL (ref 2.5–4.5)
PLATELET # BLD AUTO: 166 10*3/MM3 (ref 140–450)
PMV BLD AUTO: 10.2 FL (ref 6–12)
POTASSIUM SERPL-SCNC: 4.4 MMOL/L (ref 3.5–5.2)
PROT SERPL-MCNC: 5.5 G/DL (ref 6–8.5)
RAD ONC ARIA COURSE ID: NORMAL
RAD ONC ARIA COURSE INTENT: NORMAL
RAD ONC ARIA COURSE LAST TREATMENT DATE: NORMAL
RAD ONC ARIA COURSE START DATE: NORMAL
RAD ONC ARIA COURSE TREATMENT ELAPSED DAYS: 5
RAD ONC ARIA FIRST TREATMENT DATE: NORMAL
RAD ONC ARIA PLAN FRACTIONS TREATED TO DATE: 4
RAD ONC ARIA PLAN ID: NORMAL
RAD ONC ARIA PLAN PRESCRIBED DOSE PER FRACTION: 3 GY
RAD ONC ARIA PLAN PRIMARY REFERENCE POINT: NORMAL
RAD ONC ARIA PLAN TOTAL FRACTIONS PRESCRIBED: 10
RAD ONC ARIA PLAN TOTAL PRESCRIBED DOSE: 3000 CGY
RAD ONC ARIA REFERENCE POINT DOSAGE GIVEN TO DATE: 12 GY
RAD ONC ARIA REFERENCE POINT DOSAGE GIVEN TO DATE: 12.11 GY
RAD ONC ARIA REFERENCE POINT ID: NORMAL
RAD ONC ARIA REFERENCE POINT ID: NORMAL
RAD ONC ARIA REFERENCE POINT SESSION DOSAGE GIVEN: 3 GY
RAD ONC ARIA REFERENCE POINT SESSION DOSAGE GIVEN: 3.03 GY
RBC # BLD AUTO: 3.33 10*6/MM3 (ref 3.77–5.28)
SODIUM SERPL-SCNC: 140 MMOL/L (ref 136–145)
STRESS TARGET HR: 137 BPM
TSH SERPL DL<=0.05 MIU/L-ACNC: 0.52 UIU/ML (ref 0.27–4.2)
WBC NRBC COR # BLD: 16.33 10*3/MM3 (ref 3.4–10.8)

## 2022-10-03 PROCEDURE — 63710000001 DEXAMETHASONE PER 0.25 MG: Performed by: INTERNAL MEDICINE

## 2022-10-03 PROCEDURE — 82140 ASSAY OF AMMONIA: CPT | Performed by: INTERNAL MEDICINE

## 2022-10-03 PROCEDURE — 77386: CPT | Performed by: RADIOLOGY

## 2022-10-03 PROCEDURE — 97116 GAIT TRAINING THERAPY: CPT

## 2022-10-03 PROCEDURE — 93970 EXTREMITY STUDY: CPT

## 2022-10-03 PROCEDURE — 84443 ASSAY THYROID STIM HORMONE: CPT | Performed by: INTERNAL MEDICINE

## 2022-10-03 PROCEDURE — 77014 CHG CT GUIDANCE RADIATION THERAPY FLDS PLACEMENT: CPT | Performed by: RADIOLOGY

## 2022-10-03 PROCEDURE — 99233 SBSQ HOSP IP/OBS HIGH 50: CPT | Performed by: INTERNAL MEDICINE

## 2022-10-03 PROCEDURE — 77386 CHG INTENSITY MODULATED RADIATION TX DLVR COMPLEX: CPT | Performed by: RADIOLOGY

## 2022-10-03 PROCEDURE — 84100 ASSAY OF PHOSPHORUS: CPT | Performed by: INTERNAL MEDICINE

## 2022-10-03 PROCEDURE — 25010000002 ENOXAPARIN PER 10 MG: Performed by: INTERNAL MEDICINE

## 2022-10-03 PROCEDURE — 83735 ASSAY OF MAGNESIUM: CPT | Performed by: INTERNAL MEDICINE

## 2022-10-03 PROCEDURE — 80053 COMPREHEN METABOLIC PANEL: CPT | Performed by: INTERNAL MEDICINE

## 2022-10-03 PROCEDURE — 85025 COMPLETE CBC W/AUTO DIFF WBC: CPT | Performed by: INTERNAL MEDICINE

## 2022-10-03 PROCEDURE — 80074 ACUTE HEPATITIS PANEL: CPT | Performed by: INTERNAL MEDICINE

## 2022-10-03 RX ADMIN — ENOXAPARIN SODIUM 40 MG: 100 INJECTION SUBCUTANEOUS at 16:28

## 2022-10-03 RX ADMIN — BUPROPION HYDROCHLORIDE 150 MG: 150 TABLET, EXTENDED RELEASE ORAL at 08:05

## 2022-10-03 RX ADMIN — Medication 10 ML: at 20:55

## 2022-10-03 RX ADMIN — DEXAMETHASONE 4 MG: 4 TABLET ORAL at 08:05

## 2022-10-03 RX ADMIN — AMLODIPINE BESYLATE 2.5 MG: 2.5 TABLET ORAL at 08:05

## 2022-10-03 RX ADMIN — LEVOTHYROXINE SODIUM 25 MCG: 0.03 TABLET ORAL at 05:22

## 2022-10-03 RX ADMIN — Medication 2 PACKET: at 08:05

## 2022-10-03 RX ADMIN — Medication 10 ML: at 08:05

## 2022-10-03 RX ADMIN — DULOXETINE HYDROCHLORIDE 30 MG: 30 CAPSULE, DELAYED RELEASE ORAL at 20:53

## 2022-10-03 RX ADMIN — PANTOPRAZOLE SODIUM 40 MG: 40 TABLET, DELAYED RELEASE ORAL at 05:21

## 2022-10-03 RX ADMIN — LETROZOLE 2.5 MG: 2.5 TABLET, FILM COATED ORAL at 08:05

## 2022-10-03 RX ADMIN — ALPRAZOLAM 0.5 MG: 0.5 TABLET ORAL at 20:53

## 2022-10-03 RX ADMIN — CALCITRIOL 1 MCG: 0.25 CAPSULE ORAL at 08:08

## 2022-10-03 RX ADMIN — OXYCODONE HYDROCHLORIDE 15 MG: 15 TABLET ORAL at 10:18

## 2022-10-03 RX ADMIN — DOCUSATE SODIUM 50MG AND SENNOSIDES 8.6MG 2 TABLET: 8.6; 5 TABLET, FILM COATED ORAL at 08:05

## 2022-10-03 RX ADMIN — OXYCODONE HYDROCHLORIDE 15 MG: 15 TABLET ORAL at 05:21

## 2022-10-03 RX ADMIN — OXYCODONE HYDROCHLORIDE 40 MG: 15 TABLET, FILM COATED, EXTENDED RELEASE ORAL at 20:54

## 2022-10-03 RX ADMIN — DULOXETINE HYDROCHLORIDE 30 MG: 30 CAPSULE, DELAYED RELEASE ORAL at 05:06

## 2022-10-03 RX ADMIN — Medication 1000 MCG: at 08:05

## 2022-10-03 RX ADMIN — OXYCODONE HYDROCHLORIDE 15 MG: 15 TABLET ORAL at 16:28

## 2022-10-03 RX ADMIN — DOCUSATE SODIUM 50MG AND SENNOSIDES 8.6MG 2 TABLET: 8.6; 5 TABLET, FILM COATED ORAL at 20:53

## 2022-10-03 RX ADMIN — Medication 2 PACKET: at 18:41

## 2022-10-03 NOTE — THERAPY TREATMENT NOTE
Patient Name: Holli Patel  : 1965    MRN: [id number]                              Today's Date: 10/3/2022       Admit Date: 2022    Visit Dx:     ICD-10-CM ICD-9-CM   1. 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)  C34.90 162.9    C79.51 198.5     Patient Active Problem List   Diagnosis   • History of gastroesophageal reflux (GERD)   • Primary insomnia   • Mixed hyperlipidemia   • Other specified hypothyroidism   • Arthritis   • Anxiety   • History of migraine   • Essential hypertension   • History of colon polyps   • Diverticulosis   • Leukocytosis   • Personal history of tobacco use   • Osteopenia   • Dyspnea   • Malignant neoplasm of upper-outer quadrant of right breast in female, estrogen receptor positive (HCC)   • Malignant neoplasm of upper-inner quadrant of left breast in female, estrogen receptor positive (HCC)   • Primary adenocarcinoma of upper lobe of left lung (HCC)   • Encounter for fitting and adjustment of vascular catheter   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)   • 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)   • History of bilateral breast cancer   • History of DVT (deep vein thrombosis)   • Stage 3a chronic kidney disease (HCC)   • Confusion   • Chemotherapy-induced neutropenia (HCC)   • Metabolic acidosis   • Ataxia   • Bony metastasis (HCC)   • Hypopotassemia   • Hypophosphatemia   • Hypomagnesemia   • Cancer related pain     Past Medical History:   Diagnosis Date   • Anxiety    • Clot     POSSIBLE BLOOD CLOT IN VENOUS ACCESS DEVICE-PT STATES WAS STARTED ON ELIQUIS    • Dyspnea on exertion    • Elevated cholesterol    • Frequent urination     DAY AND NIGHT   • SHAYY (generalized anxiety disorder)     RELATED HIGH BLOOD PRESSURE   • GERD (gastroesophageal reflux disease)    • High blood pressure     ANXIETY RELATED   • Hyperlipidemia    • Hypothyroidism    • Lung cancer (HCC)    • Lung nodule     LEFT   • Malignant neoplasm of upper-outer  quadrant of right breast in female, estrogen receptor positive (HCC) 4/13/2021    PT STATES HAS BILAT BREAST CANCER   • Migraine    • PONV (postoperative nausea and vomiting)      Past Surgical History:   Procedure Laterality Date   • BREAST BIOPSY Bilateral 04/07/2021    Right Breast 10 o'clock & Left breast 10 o'clock 6 cm from nipple, [facility]   • CLOSED REDUCTION HIP DISLOCATION Left 4/30/2021    Procedure: BRONCHOSCOPY, LEFT VIDEO ASSITED THORACOSCOPY, ROBOTIC ASSSITED MEDIASTINAL LYMPHADENECTOMY WITH INTERCOSTAL NERVE BLOCKS;  Surgeon: Raphael Kerr III, MD;  Location: [facility];  Service: DaVinci;  Laterality: Left;   • COLONOSCOPY     • TUBAL ABDOMINAL LIGATION     • VENOUS ACCESS DEVICE (PORT) INSERTION Right 5/20/2021    Procedure: INSERTION VENOUS ACCESS DEVICE;  Surgeon: Raphael Kerr III, MD;  Location: [facility];  Service: Thoracic;  Laterality: Right;   • VENOUS ACCESS DEVICE (PORT) INSERTION Right 11/29/2021    Procedure: REVISION AND REPLACEMENT RIGHT SUBCLAVIAN VENOUS ACCESS PORT;  Surgeon: Raphael Kerr III, MD;  Location: [facility];  Service: Thoracic;  Laterality: Right;   • WRIST SURGERY Right       General Information     Row Name 10/03/22 1138          Physical Therapy Time and Intention    Document Type therapy note (daily note)  -     Mode of Treatment physical therapy  -     Row Name 10/03/22 1138          General Information    Existing Precautions/Restrictions fall  -     Row Name 10/03/22 1138          Safety Issues, Functional Mobility    Impairments Affecting Function (Mobility) balance;coordination;endurance/activity tolerance;strength;postural/trunk control  -     Cognitive Impairments, Mobility Safety/Performance insight into deficits/self-awareness;problem-solving/reasoning;judgment;safety precaution follow-through;sequencing abilities;impulsivity  -PH     Comment, Safety Issues/Impairments (Mobility) gt belt and non skid socks donned  -PH            User Key  (r) = Recorded By, (t) = Taken By, (c) = Cosigned By    Initials Name Provider Type     Yamileth Thomas PTA Physical Therapist Assistant               Mobility     Row Name 10/03/22 1139          Bed Mobility    Bed Mobility supine-sit  -PH     Supine-Sit [place] (Bed Mobility) contact guard;verbal cues  -PH     Sit-Supine [place] (Bed Mobility) supervision  -PH     Assistive Device (Bed Mobility) bed rails;head of bed elevated  -PH     Comment, (Bed Mobility) extra time to EOB w/ pt requesting a few min seated rest  -PH     Row Name 10/03/22 1139          Sit-Stand Transfer    Sit-Stand [place] (Transfers) contact guard;verbal cues  -PH     Assistive Device (Sit-Stand Transfers) walker, front-wheeled  -PH     Comment, (Sit-Stand Transfer) educ prior to standing to maintain widened SHERRI for steadiness.  -     Row Name 10/03/22 1139          Gait/Stairs (Locomotion)    [place] Level (Gait) verbal cues;minimum assist (75% patient effort)  -PH     Assistive Device (Gait) walker, front-wheeled  -PH     Distance in Feet (Gait) 70'  -PH     Deviations/Abnormal Patterns (Gait) yves decreased;gait speed decreased;scissoring;stride length decreased  -PH     Bilateral Gait Deviations forward flexed posture;heel strike decreased  -PH     [place] Level (Stairs) not tested  -PH     Comment, (Gait/Stairs) max a for steering fww. pt cued many times to widen SHERRI w/ L scissoring noted/narrow SHERRI. Unsteady w/ no LOB  -PH           User Key  (r) = Recorded By, (t) = Taken By, (c) = Cosigned By    Initials Name Provider Type     Yamileth Thomas PTA Physical Therapist Assistant               Obj/Interventions     Row Name 10/03/22 1142          Motor Skills    Therapeutic Exercise other (see comments)  refusal  -     Row Name 10/03/22 1142          Balance    Balance Assessment sitting static balance  -PH     Static Sitting Balance contact guard  -     Comment, Balance pt  leaning too far fwd as she sat at EOB after gait w/ min A given and cues to correct - no LOB  -PH           User Key  (r) = Recorded By, (t) = Taken By, (c) = Cosigned By    Initials Name Provider Type    Yamileth Lawrence PTA Physical Therapist Assistant               Goals/Plan    No documentation.                Clinical Impression     Row Name 10/03/22 1143          Pain    Posttreatment Pain Rating 8/10  -PH     Pain Location - Side/Orientation Right  -PH     Pain Location - calf  -PH     Pain Intervention(s) Repositioned;Rest  -PH     Additional Documentation Pain Scale: Numbers Pre/Post-Treatment (Group)  -PH     Row Name 10/03/22 1143          Plan of Care Review    Plan of Care Reviewed With patient;spouse  -PH     Progress no change  -PH     Outcome Evaluation Pt tolerating PT today. Pt sat up to EOB req CGA. Pt reporting R calf pain at EOB. Pt educ prior to standing on need to widen SHERRI during standing and amb w/ pt verbalizing understanding. Pt stood req CGA and use of fww. Pt amb approx 70' req min A and use of fww. Pt req max A for steering fww. Pt unsteady w/ narrow SHERRI and L foot scissoring. Pt refused ther ex 2/2 fatigue and R calf pain after amb. PT returned to bed w/ SV. PT will prog as pt salima.  -PH     Row Name 10/03/22 1143          Positioning and Restraints    Pre-Treatment Position in bed  -PH     Post Treatment Position bed  -PH     In Bed side lying right;call light within reach;encouraged to call for assist;exit alarm on;with family/caregiver  -PH           User Key  (r) = Recorded By, (t) = Taken By, (c) = Cosigned By    Initials Name Provider Type    Yamileth Lawrence PTA Physical Therapist Assistant               Outcome Measures     Row Name 10/03/22 1147          How much help from another person do you currently need...    Turning from your back to your side while in flat bed without using bedrails? 3  -PH     Moving from lying on back to sitting on the side of a  flat bed without bedrails? 3  -PH     Moving to and from a bed to a chair (including a wheelchair)? 3  -PH     Standing up from a chair using your arms (e.g., wheelchair, bedside chair)? 3  -PH     Climbing 3-5 steps with a railing? 2  -PH     To walk in hospital room? 3  -PH     AM-PAC 6 Clicks Score (PT) 17  -PH     Highest level of mobility 5 --> Static standing  -PH     Row Name 10/03/22 1147          Functional Assessment    Outcome Measure Options AM-PAC 6 Clicks Basic Mobility (PT)  -PH           User Key  (r) = Recorded By, (t) = Taken By, (c) = Cosigned By    Initials Name Provider Type    PH Yamileth Thomas PTA Physical Therapist Assistant                             Physical Therapy Education                 Title: PT OT SLP Therapies (Done)     Topic: Physical Therapy (Done)     Point: Mobility training (Done)     Learning Progress Summary           Patient Acceptance, E,D, VU,NR by  at 10/3/2022 1147   Family Acceptance, E, VU by  at 9/29/2022 1157      Show all documentation for this point (6)                 Point: Home exercise program (Done)     Learning Progress Summary           Patient Acceptance, E,D, VU,NR by  at 10/3/2022 1147   Family Acceptance, E, VU by  at 9/29/2022 1157      Show all documentation for this point (4)                 Point: Body mechanics (Done)     Learning Progress Summary           Patient Acceptance, E,D, VU,NR by  at 10/3/2022 1147   Family Acceptance, E, VU by HF at 9/29/2022 1157      Show all documentation for this point (6)                 Point: Precautions (Done)     Learning Progress Summary           Patient Acceptance, E,D, VU,NR by  at 10/3/2022 1147   Family Acceptance, E, VU by  at 9/29/2022 1157      Show all documentation for this point (6)                             User Key     Initials Effective Dates Name Provider Type Discipline     06/16/21 -  Yamileth Thomas PTA Physical Therapist Assistant PT    HF 09/22/22 -   Karma Parham, RN Registered Nurse Nurse              PT Recommendation and Plan     Plan of Care Reviewed With: patient, spouse  Progress: no change  Outcome Evaluation: Pt tolerating PT today. Pt sat up to EOB req CGA. Pt reporting R calf pain at EOB. Pt educ prior to standing on need to widen SHERRI during standing and amb w/ pt verbalizing understanding. Pt stood req CGA and use of fww. Pt amb approx 70' req min A and use of fww. Pt req max A for steering fww. Pt unsteady w/ narrow SHERRI and L foot scissoring. Pt refused ther ex 2/2 fatigue and R calf pain after amb. PT returned to bed w/ SV. PT will prog as pt salima.     Time Calculation:    PT Charges     Row Name 10/03/22 1147             Time Calculation    Start Time 1105  -PH      Stop Time 1118  -PH      Time Calculation (min) 13 min  -PH      PT Received On 10/03/22  -PH      PT - Next Appointment 10/04/22  -PH              Timed Charges    75091 - Gait Training Minutes  13  -PH              Total Minutes    Timed Charges Total Minutes 13  -PH       Total Minutes 13  -PH            User Key  (r) = Recorded By, (t) = Taken By, (c) = Cosigned By    Initials Name Provider Type    PH Yamileth Thomas PTA Physical Therapist Assistant              Therapy Charges for Today     Code Description Service Date Service Provider Modifiers Qty    [id number] HC GAIT TRAINING EA 15 MIN 10/3/2022 Yamileth Thomas PTA GP 1          PT G-Codes  Outcome Measure Options: AM-PAC 6 Clicks Basic Mobility (PT)  AM-PAC 6 Clicks Score (PT): 17  AM-PAC 6 Clicks Score (OT): 12    Yamileth Thomas PTA  10/3/2022

## 2022-10-03 NOTE — PLAN OF CARE
Goal Outcome Evaluation:  Plan of Care Reviewed With: patient, spouse        Progress: no change  Outcome Evaluation: Pt tolerating PT today. Pt sat up to EOB req CGA. Pt reporting R calf pain at EOB. Pt educ prior to standing on need to widen SHERRI during standing and amb w/ pt verbalizing understanding. Pt stood req CGA and use of fww. Pt amb approx 70' req min A and use of fww. Pt req max A for steering fww. Pt unsteady w/ narrow SHERRI and L foot scissoring. Pt refused ther ex 2/2 fatigue and R calf pain after amb. PT returned to bed w/ SV. PT will prog as pt salima.    Patient was intermittently wearing a face mask during this therapy encounter. Therapist used appropriate personal protective equipment including mask and gloves.  Mask used was standard procedure mask. Appropriate PPE was worn during the entire therapy session. Hand hygiene was completed before and after therapy session. Patient is not in enhanced droplet precautions.

## 2022-10-03 NOTE — PROGRESS NOTES
Name: Holli Patel ADMIT: 2022   : 1965  PCP: Vandana Gastelum MD    MRN: [id number] LOS: 10 days   AGE/SEX: 57 y.o. female  ROOM: [facility]   Subjective   No chief complaint on file.     Improved mentation when off the fentanyl patch per family with more lucid speech and no reported hallucination. She had a lower dose patch placed today and he is concerned about confusion. Discussed wax/wane symptoms with him. He discussed with oncology and are looking for alternative pain control options.    She reports no CP SOA NVD and that pain is currently controlled at rest.    Objective   Vital Signs  Temp:  [97.6 °F (36.4 °C)-98.3 °F (36.8 °C)] 97.6 °F (36.4 °C)  Heart Rate:  [85-96] 85  Resp:  [16-18] 16  BP: (101-125)/(72-86) 107/73  SpO2:  [95 %-97 %] 97 %  on   ;   Device (Oxygen Therapy): room air  Body mass index is 28.12 kg/m².    Physical Exam  Vitals and nursing note reviewed.   Constitutional:       General: She is not in acute distress.     Appearance: She is ill-appearing. She is not diaphoretic.   HENT:      Head: Normocephalic and atraumatic.   Eyes:      General:         Right eye: No discharge.         Left eye: No discharge.      Conjunctiva/sclera: Conjunctivae normal.   Cardiovascular:      Rate and Rhythm: Normal rate and regular rhythm.      Pulses: Normal pulses.   Pulmonary:      Effort: Pulmonary effort is normal.      Breath sounds: No wheezing.   Abdominal:      General: There is no distension.      Palpations: Abdomen is soft.      Tenderness: There is no abdominal tenderness. There is no guarding or rebound.   Musculoskeletal:         General: Tenderness present. No swelling.      Cervical back: Neck supple. No tenderness.   Skin:     General: Skin is warm and dry.   Neurological:      Mental Status: She is alert.      Cranial Nerves: No cranial nerve deficit.   Psychiatric:         Attention and Perception: She is inattentive.         Mood and Affect: Mood normal.          Behavior: Behavior normal.       I have reviewed the physical exam today and updated where needed as above.        Results Review:       I reviewed the patient's new clinical results.   I reviewed imaging results, agree with interpretation.    Results from last 7 days   Lab Units 10/03/22  0503 10/02/22  0622 10/01/22  0531 09/30/22  0908   WBC 10*3/mm3 16.33* 16.98* 17.94* 18.37*   HEMOGLOBIN g/dL 9.3* 9.6* 9.3* 9.9*   PLATELETS 10*3/mm3 166 189 186 206     Results from last 7 days   Lab Units 10/03/22  0503 10/02/22  0622 10/01/22  0531 09/30/22  0908   SODIUM mmol/L 140 140 138 140   POTASSIUM mmol/L 4.4 4.9 4.4 4.5   CHLORIDE mmol/L 101 102 103 104   CO2 mmol/L 28.3 28.0 26.5 24.5   BUN mg/dL 21* 22* 20 20   CREATININE mg/dL 0.82 0.89 0.89 0.95   GLUCOSE mg/dL 77 116* 104* 175*   Estimated Creatinine Clearance: 70.2 mL/min (by C-G formula based on SCr of 0.82 mg/dL).  Results from last 7 days   Lab Units 10/03/22  0503 10/02/22  0622 10/01/22  0531 09/30/22  0908 09/28/22  0502 09/26/22  1836   CALCIUM mg/dL 8.7 9.0 8.3* 8.5*   < >  --    ALBUMIN g/dL 3.50 3.60 3.70 3.90   < >  --    MAGNESIUM mg/dL 1.8  --   --   --   --  2.6   PHOSPHORUS mg/dL 3.4 4.1  --   --   --  2.5    < > = values in this interval not displayed.          amLODIPine, 2.5 mg, Oral, Q24H  buPROPion XL, 150 mg, Oral, Daily  calcitriol, 1 mcg, Oral, Daily  dexamethasone, 4 mg, Oral, Daily With Breakfast  DULoxetine, 30 mg, Oral, Nightly  enoxaparin, 40 mg, Subcutaneous, Q24H  fentaNYL, 1 patch, Transdermal, Q72H  folic acid, 1,000 mcg, Oral, Daily  letrozole, 2.5 mg, Oral, Daily  levothyroxine, 25 mcg, Oral, Q AM  pantoprazole, 40 mg, Oral, QAM  polyethylene glycol, 17 g, Oral, Daily   And  senna-docusate sodium, 2 tablet, Oral, BID  potassium & sodium phosphates, 2 packet, Oral, BID AC  sodium chloride, 10 mL, Intravenous, Q12H       Diet Regular; Cardiac    Assessment & Plan      Active Hospital Problems    Diagnosis  POA   • **Cancer  related pain [G89.3]  Yes   • Stage 3a chronic kidney disease (HCC) [N18.31]  Yes   • 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       · Metastatic NSCLC with Cancer Related Pain: Bone metastases. Undergoing palliative radiation therapy. Has been on Chemotherapy and Xgeva. On Fentanyl, dexamethasone, cymbalta. Possible transition off fentanyl. Oncology following.  · HTN: Acceptable acutely. Continue current regimen.  · Hypothyroidism: Synthroid. TSH slightly low. Going to repeat TSH in am to confirm and if mental status issues continue and TSH remains suppressed, will lower her dose.  · Thrush: Diflucan/nystatin  · LFT: Liver US ok. Acute hepatitis panel negative.  · Metabolic Encephalopathy  · Insomnia/Hallucination: Hallucination improved. Rather than starting additional agent, will monitor with the adjustment to her pain regimen. If she has more hallucinations, she did not tolerate trazodone in the past per her report. Could consider nightly seroquel in that scenario.  · PPx: Lovenox  · Disposition: TBD    Discussed with Dr Cardenas.    Blaine Mora MD  [facility]  10/03/22  11:26 EDT    Dictated portions using Dragon dictation software.    During the entire encounter, I was wearing recommended PPE including face mask and eye protection. Hand sanitization was performed prior to entering room and upon exit.

## 2022-10-03 NOTE — PLAN OF CARE
Goal Outcome Evaluation:  Plan of Care Reviewed With: patient        Progress: no change  Outcome Evaluation: Pt has had  at bs most of the night, left for a bit--stated would be back around 8am.  Pt has been confused during the night, has complained of pain in back and BLE--oxycodone given twice--controll pain well.  External cath and brief in place. Bed alarm on--pt at high risk for falls.  Xanax did assist pt in getting sleep.  Fentanyl patch decreased to 50mcg-on right shoulder.

## 2022-10-03 NOTE — PROGRESS NOTES
[facility] INPATIENT PROGRESS NOTE    Length of Stay:  10 days    CHIEF COMPLAINT/REASON FOR VISIT:  Metastatic non-small cell lung cancer  Cancer-related pain    SUBJECTIVE:   states that when the Fentanyl patch was removed yesterday she was very lucid, but even with a decrease in the dose to 50 mcg overnight she was restless and now more confused this morning. She complains of pain in both of her calves. Afebrile. No bleeding.      ROS:  14 systems reviewed with pertinent positives and negatives in the HPI. Reviewed today.    OBJECTIVE:  Vitals:    10/02/22 1615 10/02/22 2118 10/03/22 0453 10/03/22 0803   BP: 101/75 125/86 116/72 107/73   BP Location: Right arm Left arm Left arm Left arm   Patient Position: Lying Lying Lying Lying   Pulse: 96 90 87 85   Resp: 18 18 18 16   Temp: 98 °F (36.7 °C) 98.3 °F (36.8 °C) 98.1 °F (36.7 °C) 97.6 °F (36.4 °C)   TempSrc: Oral Oral Oral Oral   SpO2: 95% 96% 97%    Weight:       Height:             PHYSICAL EXAMINATION:   General: NAD, alert to month, year. States she's at [facility] than [facility]  Chest/Lungs: CTAB. Right chest mediport in place  Heart: RRR  Abdomen/GI: soft, NT, ND, NABS  Extremities: WWP, no edema    DIAGNOSTIC DATA:  Results Review:     I reviewed the patient's new clinical results.    Results from last 7 days   Lab Units 10/03/22  0503   WBC 10*3/mm3 16.33*   HEMOGLOBIN g/dL 9.3*   HEMATOCRIT % 28.8*   PLATELETS 10*3/mm3 166     Lab Results   Component Value Date    NEUTROABS 14.34 (H) 10/03/2022     Results from last 7 days   Lab Units 10/03/22  0503   SODIUM mmol/L 140   POTASSIUM mmol/L 4.4   CHLORIDE mmol/L 101   CO2 mmol/L 28.3   BUN mg/dL 21*   CREATININE mg/dL 0.82   GLUCOSE mg/dL 77   CALCIUM mg/dL 8.7         Results from last 7 days   Lab Units 10/03/22  0503   MAGNESIUM mg/dL 1.8         IMAGING:    MRI Pelvis With & Without Contrast (09/24/2022 17:26)    MRI pelvis with sacral mets and sacral ala fracture. Images  personally reviewed.     ASSESSMENT:  This is a 57 y.o. female with:     *Metastatic non-small cell lung cancer  · She was initially diagnosed with stage III (T1N2M0) disease.    · She received initial therapy with cisplatin and Alimta concurrent with radiation therapy beginning 5/25/2021.    · There were indeterminate findings in the lumbar spine at L1 and it was recommended to monitor this over time.    · Patient completed 3 cycles cisplatin and Alimta 7/7/2021 and completed radiation therapy 7/12/2021.    · PET scan 8/6/2021 showed good response to treatment.    · MRI lumbar spine 10/1/2021 with increase in size of the L1 lesion.    · Biopsy of the L1 lesion on 10/14/2022 confirmed metastatic adenocarcinoma of lung origin, PD-L1 TPS 0.    · PET scan 10/26/2021 with involvement of left adrenal and L1/L2 only.    · Patient received radiation therapy to L1/L2 on 11/19/2021.  She also received radiation to the left adrenal gland.    · She initiated further systemic therapy on 11/22/2021 with Keytruda and Alimta.    · Follow-up PET scan 1/18/2022 with decrease in small left upper lobe pulmonary nodule, resolution of activity at L1/L2, no new sites of disease.    · Guardant 360 CT DNA level was monitored and was decreasing.    · PET scan 4/11/2022 with progression in left adrenal and L1.    · Maintained systemic therapy with Keytruda and Alimta and received additional radiation to left adrenal and L1.  Alimta however held since 5/10/2022 due to renal dysfunction.  Radiation completed to lumbar spine 7/14/2022.    · PET scan 7/18/2022 with multiple areas of progression of the spine and right sacrum.    · Guardant 360 analysis 7/20/2022 with no actionable mutations.    · Change in therapy to single agent palliative Taxotere initiated 7/26/2022.    · Altered mental status and febrile neutropenia requiring hospitalization 8/2 - 8/9/2022.  During that admission, MRI brain, cervical, thoracic, lumbar spine performed 8/4/2022  in addition to CT cervical spine 8/5/2022.  There was evidence of C2 metastatic lesion with some dural enhancement, compression deformity at T7 with mild edema suggesting acute to subacute fracture, 5 mm T12 spinous process metastasis, multifocal disease in the lumbar spine and sacrum, largest involving sacral ala to the right 5 cm in transverse dimension.  Loss of height at L2 25%.  · Patient received cycle 3 Taxotere 9/6/2022 with Neulasta support.    · She has been continuing as well on Xgeva every 4 weeks (last received 9/6/2022).  · Patient did undergo MRI lumbar spine and pelvis on 9/24/2022.  MRI pelvis showed bilateral sacral fractures with marrow infiltration related to metastasis more prominent on the right.  Lesion on the right 3.5 x 4.1 x 4.3 cm.  Also probable metastasis along posterior acetabulum on the right 1.5 cm.  MRI lumbar spine with stable L1 metastasis, new edema endplate T12 possibly stress reaction versus developing new metastasis, lesion at T12 spinous process unchanged.   · Radiation oncology consulted with plans for palliative treatment of the right sacral metastasis.  Treatment initiated 9/28/2022  · Patient was going to have a PET scan which was delayed after she was admitted.     *Bilateral stage I breast cancer  · Patient with bilateral breast cancer, both stage I ([id number]), grade 2, ER/FL positive and HER2/melinda negative with low Ki-67.    · Patient initiated adjuvant letrozole in May 2021.  · Patient continues to take letrozole daily.     *Cancer related pain  · Patient has been receiving Duragesic patch 50 mcg every 72 hours in addition to oxycodone 15 mg every 4 hours for breakthrough pain.  Duragesic dose had been escalated recently in the outpatient setting due to worsening pain  · On admission 9/23/2022, Duragesic patch increased to 75 mcg daily, added Decadron 4 mg IV every 6 hours with continuation of oxycodone 15 mg every 4 hours as needed for breakthrough pain in addition of  Dilaudid 1 mg every 2 hours as needed for breakthrough pain.  · Patient with increasing side effects from narcotics, Duragesic decreased on 9/25/2022 from 75 down to 50 mcg patch every 72 hours.  · Dexamethasone dose decreased to 4 mg p.o. every 12 hours on 9/27/2022  · Initiated palliative radiation to the right sacrum on 9/28/2022  · Patient became confused after receiving Dilaudid.  Patient and family asked to discontinue Dilaudid.  · Duragesic patch dose was increased to 75 mcg/h on 9/30/2022. Subsequent decrease to 50 mcg due to confusion  · Gabapentin was discontinued due to the sedating effect.  · Oxycodone is being used for breakthrough pain.     *Anxiety/depression  · Patient was previously on Wellbutrin  mg daily, Lexapro 10 mg daily.  · On 9/30/2022, she was switched from Lexapro to Cymbalta 30 mg daily.     *Difficulty with sleep.  · Patient was previously on Xanax but reported that it was not working.  · Patient previously tried melatonin and it was not effective.  · She was placed on Restoril 15 mg nightly on 10/1/2022.  · The patient had 3 falls since last night.  · Its not clear if this is due to Restoril but since this happened after the change, we switched back to Xanax-Home dose of 0.5 mg as needed for sleep.     *Anemia  · Most recent evaluation 8/3/2022 with iron 15, ferritin 276, iron saturation 5%, TIBC 328, folate greater than 20, B12 392  · Anemia felt to be related to malignancy/chronic disease and chemotherapy  · Hemoglobin has remained in the 8-10 range  · Hemoglobin is 9.3 today.     *Peripheral neuropathy  · Patient was on gabapentin 300 mg twice daily.  · Gabapentin was discontinued on 9/30/2022.  · She is currently on Cymbalta.     *Narcotic induced constipation  · Patient is on Ysbil-Colace 2 tablets twice daily with MiraLAX daily   · Patient is now having bowel movements.     *GI prophylaxis  · Protonix 40 mg daily     *DVT prophylaxis  · Lovenox 40 mg daily     *Elevated liver  labs  · Atorvastatin was discontinued  · ?  Due to Diflucan (given to treat thrush).    · AST is 39 and ALT is 105 today.    *Delirium  · Neurology following  · ? Hospital delirium +/- medications    *BLE calf pain  · No edema but will get BLE venous duplex due to symptoms    RECOMMENDATIONS:  1. Continue radiation  2. Discussed with  her pain regimen. He's concerned the Fentanyl is causing the delirium. Discussed with pharmacy. May consider transition to sustained release oxycydone.  3. Continue prn oxycodone  4. Cymbalta at 30 mg daily  5. Continue dex at 4 mg in the morning  6. Xanax qhs prn  7. BLE venous duplex as she's complaining of RLE >LLE calf pain  8. F/u with Dr. Jacobs as an outpatient when possible    Discussed with her . Discussed with pharmacy staff.     All issues new to me today. 35 minutes.       Eddie Cardenas MD

## 2022-10-04 ENCOUNTER — RADIATION ONCOLOGY WEEKLY ASSESSMENT (OUTPATIENT)
Dept: RADIATION ONCOLOGY | Facility: HOSPITAL | Age: 57
End: 2022-10-04

## 2022-10-04 ENCOUNTER — TREATMENT (OUTPATIENT)
Dept: RADIATION ONCOLOGY | Facility: HOSPITAL | Age: 57
End: 2022-10-04

## 2022-10-04 ENCOUNTER — APPOINTMENT (OUTPATIENT)
Dept: CT IMAGING | Facility: HOSPITAL | Age: 57
End: 2022-10-04

## 2022-10-04 DIAGNOSIS — C79.51 NON-SMALL CELL LUNG CANCER METASTATIC TO BONE: Primary | ICD-10-CM

## 2022-10-04 DIAGNOSIS — C34.90 NON-SMALL CELL LUNG CANCER METASTATIC TO BONE: Primary | ICD-10-CM

## 2022-10-04 LAB
ALBUMIN SERPL-MCNC: 3.7 G/DL (ref 3.5–5.2)
ALBUMIN/GLOB SERPL: 1.8 G/DL
ALP SERPL-CCNC: 111 U/L (ref 39–117)
ALT SERPL W P-5'-P-CCNC: 96 U/L (ref 1–33)
ANION GAP SERPL CALCULATED.3IONS-SCNC: 10.9 MMOL/L (ref 5–15)
AST SERPL-CCNC: 28 U/L (ref 1–32)
BACTERIA UR QL AUTO: ABNORMAL /HPF
BASOPHILS # BLD AUTO: 0.02 10*3/MM3 (ref 0–0.2)
BASOPHILS NFR BLD AUTO: 0.1 % (ref 0–1.5)
BILIRUB SERPL-MCNC: 0.4 MG/DL (ref 0–1.2)
BILIRUB UR QL STRIP: NEGATIVE
BUN SERPL-MCNC: 21 MG/DL (ref 6–20)
BUN/CREAT SERPL: 25.3 (ref 7–25)
CALCIUM SPEC-SCNC: 8.6 MG/DL (ref 8.6–10.5)
CHLORIDE SERPL-SCNC: 102 MMOL/L (ref 98–107)
CLARITY UR: ABNORMAL
CO2 SERPL-SCNC: 27.1 MMOL/L (ref 22–29)
COLOR UR: YELLOW
CREAT SERPL-MCNC: 0.83 MG/DL (ref 0.57–1)
DEPRECATED RDW RBC AUTO: 51.4 FL (ref 37–54)
EGFRCR SERPLBLD CKD-EPI 2021: 81.3 ML/MIN/1.73
EOSINOPHIL # BLD AUTO: 0.01 10*3/MM3 (ref 0–0.4)
EOSINOPHIL NFR BLD AUTO: 0.1 % (ref 0.3–6.2)
ERYTHROCYTE [DISTWIDTH] IN BLOOD BY AUTOMATED COUNT: 17.1 % (ref 12.3–15.4)
GLOBULIN UR ELPH-MCNC: 2.1 GM/DL
GLUCOSE SERPL-MCNC: 89 MG/DL (ref 65–99)
GLUCOSE UR STRIP-MCNC: NEGATIVE MG/DL
HCT VFR BLD AUTO: 30.5 % (ref 34–46.6)
HGB BLD-MCNC: 10.1 G/DL (ref 12–15.9)
HGB UR QL STRIP.AUTO: ABNORMAL
HYALINE CASTS UR QL AUTO: ABNORMAL /LPF
IMM GRANULOCYTES # BLD AUTO: 0.43 10*3/MM3 (ref 0–0.05)
IMM GRANULOCYTES NFR BLD AUTO: 2.9 % (ref 0–0.5)
KETONES UR QL STRIP: NEGATIVE
LEUKOCYTE ESTERASE UR QL STRIP.AUTO: ABNORMAL
LYMPHOCYTES # BLD AUTO: 0.39 10*3/MM3 (ref 0.7–3.1)
LYMPHOCYTES NFR BLD AUTO: 2.6 % (ref 19.6–45.3)
MCH RBC QN AUTO: 28.1 PG (ref 26.6–33)
MCHC RBC AUTO-ENTMCNC: 33.1 G/DL (ref 31.5–35.7)
MCV RBC AUTO: 85 FL (ref 79–97)
MONOCYTES # BLD AUTO: 1.07 10*3/MM3 (ref 0.1–0.9)
MONOCYTES NFR BLD AUTO: 7.2 % (ref 5–12)
NEUTROPHILS NFR BLD AUTO: 12.92 10*3/MM3 (ref 1.7–7)
NEUTROPHILS NFR BLD AUTO: 87.1 % (ref 42.7–76)
NITRITE UR QL STRIP: POSITIVE
NRBC BLD AUTO-RTO: 0.1 /100 WBC (ref 0–0.2)
PH UR STRIP.AUTO: 7.5 [PH] (ref 5–8)
PLATELET # BLD AUTO: 175 10*3/MM3 (ref 140–450)
PMV BLD AUTO: 10.1 FL (ref 6–12)
POTASSIUM SERPL-SCNC: 4.1 MMOL/L (ref 3.5–5.2)
PROT SERPL-MCNC: 5.8 G/DL (ref 6–8.5)
PROT UR QL STRIP: ABNORMAL
RAD ONC ARIA COURSE ID: NORMAL
RAD ONC ARIA COURSE INTENT: NORMAL
RAD ONC ARIA COURSE LAST TREATMENT DATE: NORMAL
RAD ONC ARIA COURSE START DATE: NORMAL
RAD ONC ARIA COURSE TREATMENT ELAPSED DAYS: 6
RAD ONC ARIA FIRST TREATMENT DATE: NORMAL
RAD ONC ARIA PLAN FRACTIONS TREATED TO DATE: 5
RAD ONC ARIA PLAN ID: NORMAL
RAD ONC ARIA PLAN PRESCRIBED DOSE PER FRACTION: 3 GY
RAD ONC ARIA PLAN PRIMARY REFERENCE POINT: NORMAL
RAD ONC ARIA PLAN TOTAL FRACTIONS PRESCRIBED: 10
RAD ONC ARIA PLAN TOTAL PRESCRIBED DOSE: 3000 CGY
RAD ONC ARIA REFERENCE POINT DOSAGE GIVEN TO DATE: 15 GY
RAD ONC ARIA REFERENCE POINT DOSAGE GIVEN TO DATE: 15.14 GY
RAD ONC ARIA REFERENCE POINT ID: NORMAL
RAD ONC ARIA REFERENCE POINT ID: NORMAL
RAD ONC ARIA REFERENCE POINT SESSION DOSAGE GIVEN: 3 GY
RAD ONC ARIA REFERENCE POINT SESSION DOSAGE GIVEN: 3.03 GY
RBC # BLD AUTO: 3.59 10*6/MM3 (ref 3.77–5.28)
RBC # UR STRIP: ABNORMAL /HPF
REF LAB TEST METHOD: ABNORMAL
SODIUM SERPL-SCNC: 140 MMOL/L (ref 136–145)
SP GR UR STRIP: 1.02 (ref 1–1.03)
SQUAMOUS #/AREA URNS HPF: ABNORMAL /HPF
UROBILINOGEN UR QL STRIP: ABNORMAL
WBC # UR STRIP: ABNORMAL /HPF
WBC NRBC COR # BLD: 14.84 10*3/MM3 (ref 3.4–10.8)

## 2022-10-04 PROCEDURE — 81001 URINALYSIS AUTO W/SCOPE: CPT | Performed by: INTERNAL MEDICINE

## 2022-10-04 PROCEDURE — 70450 CT HEAD/BRAIN W/O DYE: CPT

## 2022-10-04 PROCEDURE — 25010000002 ENOXAPARIN PER 10 MG: Performed by: INTERNAL MEDICINE

## 2022-10-04 PROCEDURE — 85025 COMPLETE CBC W/AUTO DIFF WBC: CPT | Performed by: INTERNAL MEDICINE

## 2022-10-04 PROCEDURE — 77386 CHG INTENSITY MODULATED RADIATION TX DLVR COMPLEX: CPT | Performed by: RADIOLOGY

## 2022-10-04 PROCEDURE — 77014 CHG CT GUIDANCE RADIATION THERAPY FLDS PLACEMENT: CPT | Performed by: RADIOLOGY

## 2022-10-04 PROCEDURE — 87086 URINE CULTURE/COLONY COUNT: CPT | Performed by: INTERNAL MEDICINE

## 2022-10-04 PROCEDURE — 63710000001 DEXAMETHASONE PER 0.25 MG: Performed by: INTERNAL MEDICINE

## 2022-10-04 PROCEDURE — 99232 SBSQ HOSP IP/OBS MODERATE 35: CPT | Performed by: INTERNAL MEDICINE

## 2022-10-04 PROCEDURE — 87077 CULTURE AEROBIC IDENTIFY: CPT | Performed by: INTERNAL MEDICINE

## 2022-10-04 PROCEDURE — 77386: CPT | Performed by: RADIOLOGY

## 2022-10-04 PROCEDURE — 87186 SC STD MICRODIL/AGAR DIL: CPT | Performed by: INTERNAL MEDICINE

## 2022-10-04 PROCEDURE — 80053 COMPREHEN METABOLIC PANEL: CPT | Performed by: INTERNAL MEDICINE

## 2022-10-04 RX ORDER — HYDROXYZINE HYDROCHLORIDE 25 MG/1
25 TABLET, FILM COATED ORAL 3 TIMES DAILY PRN
Status: DISCONTINUED | OUTPATIENT
Start: 2022-10-04 | End: 2022-10-13 | Stop reason: HOSPADM

## 2022-10-04 RX ADMIN — ENOXAPARIN SODIUM 40 MG: 100 INJECTION SUBCUTANEOUS at 15:39

## 2022-10-04 RX ADMIN — LETROZOLE 2.5 MG: 2.5 TABLET, FILM COATED ORAL at 09:17

## 2022-10-04 RX ADMIN — DULOXETINE HYDROCHLORIDE 30 MG: 30 CAPSULE, DELAYED RELEASE ORAL at 20:45

## 2022-10-04 RX ADMIN — DOCUSATE SODIUM 50MG AND SENNOSIDES 8.6MG 2 TABLET: 8.6; 5 TABLET, FILM COATED ORAL at 20:45

## 2022-10-04 RX ADMIN — POLYETHYLENE GLYCOL 3350 17 G: 17 POWDER, FOR SOLUTION ORAL at 09:16

## 2022-10-04 RX ADMIN — Medication 2 PACKET: at 17:16

## 2022-10-04 RX ADMIN — ALPRAZOLAM 0.5 MG: 0.5 TABLET ORAL at 20:45

## 2022-10-04 RX ADMIN — AMLODIPINE BESYLATE 2.5 MG: 2.5 TABLET ORAL at 09:16

## 2022-10-04 RX ADMIN — Medication 2 PACKET: at 09:16

## 2022-10-04 RX ADMIN — Medication 10 ML: at 09:18

## 2022-10-04 RX ADMIN — PANTOPRAZOLE SODIUM 40 MG: 40 TABLET, DELAYED RELEASE ORAL at 06:00

## 2022-10-04 RX ADMIN — LEVOTHYROXINE SODIUM 25 MCG: 0.03 TABLET ORAL at 06:00

## 2022-10-04 RX ADMIN — DEXAMETHASONE 4 MG: 4 TABLET ORAL at 09:16

## 2022-10-04 RX ADMIN — OXYCODONE HYDROCHLORIDE 40 MG: 15 TABLET, FILM COATED, EXTENDED RELEASE ORAL at 09:17

## 2022-10-04 RX ADMIN — Medication 1000 MCG: at 09:16

## 2022-10-04 RX ADMIN — OXYCODONE HYDROCHLORIDE 15 MG: 15 TABLET ORAL at 15:39

## 2022-10-04 RX ADMIN — OXYCODONE HYDROCHLORIDE 40 MG: 15 TABLET, FILM COATED, EXTENDED RELEASE ORAL at 20:45

## 2022-10-04 RX ADMIN — Medication 10 ML: at 20:45

## 2022-10-04 RX ADMIN — DOCUSATE SODIUM 50MG AND SENNOSIDES 8.6MG 2 TABLET: 8.6; 5 TABLET, FILM COATED ORAL at 09:16

## 2022-10-04 RX ADMIN — CALCITRIOL 1 MCG: 0.25 CAPSULE ORAL at 09:17

## 2022-10-04 RX ADMIN — OXYCODONE HYDROCHLORIDE 15 MG: 15 TABLET ORAL at 05:21

## 2022-10-04 RX ADMIN — BUPROPION HYDROCHLORIDE 150 MG: 150 TABLET, EXTENDED RELEASE ORAL at 09:17

## 2022-10-04 RX ADMIN — HYDROXYZINE HYDROCHLORIDE 25 MG: 25 TABLET ORAL at 17:41

## 2022-10-04 NOTE — PROGRESS NOTES
Name: Holli Patel ADMIT: 2022   : 1965  PCP: Vandana Gastelum MD    MRN: [id number] LOS: 11 days   AGE/SEX: 57 y.o. female  ROOM: [facility]   Subjective   No chief complaint on file.     She had another fall last night. No CP SOA NVD. Not reporting any HA. Her pain overall is improved. Discussed with her family at bedside who were rightly concerned about the falls. They do report that her mentation overall is improving some and she has not had additional apparent hallucination.    Objective   Vital Signs  Temp:  [96.9 °F (36.1 °C)-97.7 °F (36.5 °C)] 97.7 °F (36.5 °C)  Heart Rate:  [] 103  Resp:  [16-18] 18  BP: (114-119)/(79-89) 115/80  SpO2:  [94 %-97 %] 94 %  on   ;   Device (Oxygen Therapy): room air  Body mass index is 28.12 kg/m².    Physical Exam  Vitals and nursing note reviewed.   Constitutional:       General: She is not in acute distress.     Appearance: She is ill-appearing. She is not diaphoretic.   HENT:      Head:      Comments: Hematoma/contusion right eye/scalp  Eyes:      General:         Right eye: No discharge.         Left eye: No discharge.      Conjunctiva/sclera: Conjunctivae normal.   Cardiovascular:      Rate and Rhythm: Normal rate and regular rhythm.      Pulses: Normal pulses.   Pulmonary:      Effort: Pulmonary effort is normal.      Breath sounds: No wheezing.   Abdominal:      General: There is no distension.      Palpations: Abdomen is soft.      Tenderness: There is no abdominal tenderness. There is no guarding or rebound.   Musculoskeletal:         General: Tenderness present. No swelling.      Cervical back: Neck supple. No tenderness.   Skin:     General: Skin is warm and dry.   Neurological:      Mental Status: She is alert.      Cranial Nerves: No cranial nerve deficit.   Psychiatric:         Attention and Perception: She is inattentive.         Mood and Affect: Mood normal.         Behavior: Behavior normal.         I have reviewed the physical exam  today and updated where needed as above.          Results Review:       I reviewed the patient's new clinical results.      Results from last 7 days   Lab Units 10/04/22  0600 10/03/22  0503 10/02/22  0622 10/01/22  0531   WBC 10*3/mm3 14.84* 16.33* 16.98* 17.94*   HEMOGLOBIN g/dL 10.1* 9.3* 9.6* 9.3*   PLATELETS 10*3/mm3 175 166 189 186     Results from last 7 days   Lab Units 10/04/22  0600 10/03/22  0503 10/02/22  0622 10/01/22  0531   SODIUM mmol/L 140 140 140 138   POTASSIUM mmol/L 4.1 4.4 4.9 4.4   CHLORIDE mmol/L 102 101 102 103   CO2 mmol/L 27.1 28.3 28.0 26.5   BUN mg/dL 21* 21* 22* 20   CREATININE mg/dL 0.83 0.82 0.89 0.89   GLUCOSE mg/dL 89 77 116* 104*   Estimated Creatinine Clearance: 69.4 mL/min (by C-G formula based on SCr of 0.83 mg/dL).  Results from last 7 days   Lab Units 10/04/22  0600 10/03/22  0503 10/02/22  0622 10/01/22  0531   CALCIUM mg/dL 8.6 8.7 9.0 8.3*   ALBUMIN g/dL 3.70 3.50 3.60 3.70   MAGNESIUM mg/dL  --  1.8  --   --    PHOSPHORUS mg/dL  --  3.4 4.1  --           amLODIPine, 2.5 mg, Oral, Q24H  buPROPion XL, 150 mg, Oral, Daily  calcitriol, 1 mcg, Oral, Daily  dexamethasone, 4 mg, Oral, Daily With Breakfast  DULoxetine, 30 mg, Oral, Nightly  enoxaparin, 40 mg, Subcutaneous, Q24H  folic acid, 1,000 mcg, Oral, Daily  letrozole, 2.5 mg, Oral, Daily  levothyroxine, 25 mcg, Oral, Q AM  oxyCODONE, 40 mg, Oral, Q12H  pantoprazole, 40 mg, Oral, QAM  polyethylene glycol, 17 g, Oral, Daily   And  senna-docusate sodium, 2 tablet, Oral, BID  potassium & sodium phosphates, 2 packet, Oral, BID AC  sodium chloride, 10 mL, Intravenous, Q12H       Diet Regular; Cardiac    Assessment & Plan      Active Hospital Problems    Diagnosis  POA   • **Cancer related pain [G89.3]  Yes   • Stage 3a chronic kidney disease (HCC) [N18.31]  Yes   • 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) [C34.90, C79.51]  Yes   • Essential hypertension [I10]  Yes   • History of gastroesophageal reflux (GERD) [Z87.19]   Not Applicable      Resolved Hospital Problems   No resolved problems to display.       · Metastatic NSCLC with Cancer Related Pain: Bone metastases. Undergoing palliative radiation therapy. Has been on Chemotherapy and Xgeva. Transitioned to long acting oxycodone with prn available from the fentanyl. Continues on cymbalta and dexamethasone. Oncology following.  · HTN: Acceptable acutely. Continue current regimen.  · Hypothyroidism: Synthroid. TSH ok on repeat.  · Thrush: Improved  · LFT: Liver US ok. Acute hepatitis panel negative.  · Metabolic Encephalopathy  · Falls: This last one occurred after using bedside commode and she fell forward onto face. She did not recall episode. Not reporting HA. No CP or palpitations. Will check orthostatics and screen UA given the pyuria on admit and continued leukocytosis.  The encephalopathy has been a bit better overall since stopping the fentanyl.   · PPx: Lovenox  · Disposition: TBD    Blaine Mora MD  [facility]  10/04/22  13:52 EDT    Dictated portions using Dragon dictation software.    During the entire encounter, I was wearing recommended PPE including face mask and eye protection. Hand sanitization was performed prior to entering room and upon exit.

## 2022-10-04 NOTE — PROGRESS NOTES
[facility] GROUP INPATIENT PROGRESS NOTE    Length of Stay:  11 days    CHIEF COMPLAINT/REASON FOR VISIT:  Metastatic non-small cell lung cancer  Cancer-related pain    SUBJECTIVE:  Transition to sustained release oxycodone from a fentanyl patch yesterday. Afebrile. Normotensive. On room air. Fell overnight with head CT with right frontal scalp hematoma but no intra-cranial bleeding. States pain is improving.     ROS:  14 systems reviewed with pertinent positives and negatives in the HPI. Reviewed today.    OBJECTIVE:  Vitals:    10/04/22 0507 10/04/22 0606 10/04/22 0648 10/04/22 0756   BP:  119/87 115/89 114/79   BP Location:  Left arm Left arm Left arm   Patient Position:  Lying Lying Lying   Pulse: 92 106 114 103   Resp: 16 16 16 18   Temp: 96.9 °F (36.1 °C)   97.1 °F (36.2 °C)   TempSrc: Oral  Oral Oral   SpO2: 97% 97% 97% (!) 45%   Weight:       Height:             PHYSICAL EXAMINATION:   General: NAD, alert, talkative  HEENT: R scalp and deandre-orbital hematoma  Chest/Lungs: CTAB. Right chest mediport in place  Heart: RRR  Abdomen/GI: soft, NT, ND, NABS  Extremities: WWP, no edema    DIAGNOSTIC DATA:  Results Review:     I reviewed the patient's new clinical results.    Results from last 7 days   Lab Units 10/04/22  0600   WBC 10*3/mm3 14.84*   HEMOGLOBIN g/dL 10.1*   HEMATOCRIT % 30.5*   PLATELETS 10*3/mm3 175     Lab Results   Component Value Date    NEUTROABS 12.92 (H) 10/04/2022     Results from last 7 days   Lab Units 10/04/22  0600   SODIUM mmol/L 140   POTASSIUM mmol/L 4.1   CHLORIDE mmol/L 102   CO2 mmol/L 27.1   BUN mg/dL 21*   CREATININE mg/dL 0.83   GLUCOSE mg/dL 89   CALCIUM mg/dL 8.6         Results from last 7 days   Lab Units 10/03/22  0503   MAGNESIUM mg/dL 1.8         IMAGING:    MRI Pelvis With & Without Contrast (09/24/2022 17:26)    MRI pelvis with sacral mets and sacral ala fracture.    CT Head Without Contrast (10/04/2022 07:20)    CT head images personally reviewed. R scalp  hematoma     ASSESSMENT:  This is a 57 y.o. female with:     *Metastatic non-small cell lung cancer  · She was initially diagnosed with stage III (T1N2M0) disease.    · She received initial therapy with cisplatin and Alimta concurrent with radiation therapy beginning 5/25/2021.    · There were indeterminate findings in the lumbar spine at L1 and it was recommended to monitor this over time.    · Patient completed 3 cycles cisplatin and Alimta 7/7/2021 and completed radiation therapy 7/12/2021.    · PET scan 8/6/2021 showed good response to treatment.    · MRI lumbar spine 10/1/2021 with increase in size of the L1 lesion.    · Biopsy of the L1 lesion on 10/14/2022 confirmed metastatic adenocarcinoma of lung origin, PD-L1 TPS 0.    · PET scan 10/26/2021 with involvement of left adrenal and L1/L2 only.    · Patient received radiation therapy to L1/L2 on 11/19/2021.  She also received radiation to the left adrenal gland.    · She initiated further systemic therapy on 11/22/2021 with Keytruda and Alimta.    · Follow-up PET scan 1/18/2022 with decrease in small left upper lobe pulmonary nodule, resolution of activity at L1/L2, no new sites of disease.    · Guardant 360 CT DNA level was monitored and was decreasing.    · PET scan 4/11/2022 with progression in left adrenal and L1.    · Maintained systemic therapy with Keytruda and Alimta and received additional radiation to left adrenal and L1.  Alimta however held since 5/10/2022 due to renal dysfunction.  Radiation completed to lumbar spine 7/14/2022.    · PET scan 7/18/2022 with multiple areas of progression of the spine and right sacrum.    · Guardant 360 analysis 7/20/2022 with no actionable mutations.    · Change in therapy to single agent palliative Taxotere initiated 7/26/2022.    · Altered mental status and febrile neutropenia requiring hospitalization 8/2 - 8/9/2022.  During that admission, MRI brain, cervical, thoracic, lumbar spine performed 8/4/2022 in addition  to CT cervical spine 8/5/2022.  There was evidence of C2 metastatic lesion with some dural enhancement, compression deformity at T7 with mild edema suggesting acute to subacute fracture, 5 mm T12 spinous process metastasis, multifocal disease in the lumbar spine and sacrum, largest involving sacral ala to the right 5 cm in transverse dimension.  Loss of height at L2 25%.  · Patient received cycle 3 Taxotere 9/6/2022 with Neulasta support.    · She has been continuing as well on Xgeva every 4 weeks (last received 9/6/2022).  · Patient did undergo MRI lumbar spine and pelvis on 9/24/2022.  MRI pelvis showed bilateral sacral fractures with marrow infiltration related to metastasis more prominent on the right.  Lesion on the right 3.5 x 4.1 x 4.3 cm.  Also probable metastasis along posterior acetabulum on the right 1.5 cm.  MRI lumbar spine with stable L1 metastasis, new edema endplate T12 possibly stress reaction versus developing new metastasis, lesion at T12 spinous process unchanged.   · Radiation oncology consulted with plans for palliative treatment of the right sacral metastasis.  Treatment initiated 9/28/2022  · Patient was going to have a PET scan which was delayed because she was admitted.     *Bilateral stage I breast cancer  · Patient with bilateral breast cancer, both stage I ([id number]), grade 2, ER/MT positive and HER2/melinda negative with low Ki-67.    · Patient initiated adjuvant letrozole in May 2021.  · Patient continues to take letrozole daily.     *Cancer related pain  · Patient has been receiving Duragesic patch 50 mcg every 72 hours in addition to oxycodone 15 mg every 4 hours for breakthrough pain.  Duragesic dose had been escalated recently in the outpatient setting due to worsening pain  · On admission 9/23/2022, Duragesic patch increased to 75 mcg daily, added Decadron 4 mg IV every 6 hours with continuation of oxycodone 15 mg every 4 hours as needed for breakthrough pain in addition of Dilaudid 1  mg every 2 hours as needed for breakthrough pain.  · Patient with increasing side effects from narcotics, Duragesic decreased on 9/25/2022 from 75 down to 50 mcg patch every 72 hours.  · Dexamethasone dose decreased to 4 mg p.o. every 12 hours on 9/27/2022  · Initiated palliative radiation to the right sacrum on 9/28/2022  · Patient became confused after receiving Dilaudid.  Patient and family asked to discontinue Dilaudid.  · Duragesic patch dose was increased to 75 mcg/h on 9/30/2022. Subsequent decrease to 50 mcg due to confusion  · Gabapentin was discontinued due to the sedating effect.  · Oxycodone is being used for breakthrough pain.  · 10/3 transition to sustained release oxycodone 40 mg bid and fentanyl patch stopped     *Anxiety/depression  · Patient was previously on Wellbutrin  mg daily, Lexapro 10 mg daily.  · On 9/30/2022, she was switched from Lexapro to Cymbalta 30 mg daily.     *Difficulty with sleep.  · Patient was previously on Xanax but reported that it was not working.  · Patient previously tried melatonin and it was not effective.  · She was placed on Restoril 15 mg nightly on 10/1/2022.  · The patient had 3 falls since last night.  · Its not clear if this is due to Restoril but since this happened after the change, we switched back to Xanax-Home dose of 0.5 mg as needed for sleep.     *Anemia  · Most recent evaluation 8/3/2022 with iron 15, ferritin 276, iron saturation 5%, TIBC 328, folate greater than 20, B12 392  · Anemia felt to be related to malignancy/chronic disease and chemotherapy  · Hemoglobin has remained in the 8-10 range  · Hemoglobin is 10.1 today.     *Peripheral neuropathy  · Patient was on gabapentin 300 mg twice daily.  · Gabapentin was discontinued on 9/30/2022.  · She is currently on Cymbalta.     *Narcotic induced constipation  · Patient is on Sybil-Colace 2 tablets twice daily with MiraLAX daily   · Patient is now having bowel movements.     *GI  prophylaxis  · Protonix 40 mg daily     *DVT prophylaxis  · Lovenox 40 mg daily     *Elevated liver labs  · Atorvastatin was discontinued  · ?  Due to Diflucan (given to treat thrush).    · AST is 39 and ALT is 105 today.    *Delirium  · Neurology following  · ? Hospital delirium +/- medications    *BLE calf pain  · BLE venous duplex negative. Exam reassurring    *Fall with right scalp hematoma    RECOMMENDATIONS:  1. Continue radiation  2. Continue sustained release oxycydone 40 mg q12h, added on 10/3 and fentanyl patch stopped  3. Continue prn oxycodone. She's using less of this over the past couple of days  4. Cymbalta at 30 mg daily  5. Continue dex at 4 mg in the morning  6. Xanax qhs prn  7. F/u with Dr. Jacobs as an outpatient when possible    Discussed with the patient. Tried to call her  but he didn't answer. Will follow.      Eddie Cardenas MD

## 2022-10-04 NOTE — TELEPHONE ENCOUNTER
Rx Refill Note  Requested Prescriptions     Pending Prescriptions Disp Refills   • calcitriol (ROCALTROL) 0.5 MCG capsule [Pharmacy Med Name: CALCITRIOL 0.5 MCG CAPSULE] 60 capsule 0     Sig: TAKE TWO CAPSULES BY MOUTH DAILY      Last office visit with prescribing clinician: 8/30/2022      Next office visit with prescribing clinician: 12/2/2022            North Young MA  10/04/22, 10:14 EDT

## 2022-10-04 NOTE — PROGRESS NOTES
Physician Weekly Management Note    Diagnosis:     Diagnosis Plan   1. 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)         RT Details:  fx 5/10 right sacrum 15/30Gy    Notes on Treatment course, Films, Medical progress:  Doing ok, still inpatient kps 80%, she has had falls and now has black right eye, pain in right sacrum slightly better but now with pain in her right knee.  She is transiting to oxycodone ER 40mg bid and fentanyl patch stopped. She had a CT head ealier today which showed right scalp hematoma,     Weekly Management:  Medication reviewed?   Yes  New medications given?   No  Problemlist reviewed?   Yes  Problem added?   No  Issues raised requiring referral to support services - task assigned to:  na    Technical aspects reviewed:  Weekly OBI approved?   Yes  Weekly port films approved?   Yes  Change requests noted on port film?   No  Patient setup and plan reviewed?   Yes    Chart Reviewed:  Continue current treatment plan?   Yes  Treatment plan change requested?   No  CBC reviewed?   Yes  Concurrent Chemo?   No    Objective     Toxicities:   none     Review of Systems   Constitutional: Positive for activity change, appetite change, fatigue and unexpected weight change.   Musculoskeletal: Positive for arthralgias, back pain, gait problem, joint swelling and neck stiffness.   Neurological: Positive for dizziness and headaches.          There were no vitals filed for this visit.    Current Status 9/13/2022   ECOG score 1       Physical Exam  HENT:      Head:        Comments: echymossis right eye   Psychiatric:         Mood and Affect: Mood normal.             Problem Summary List    Diagnosis:     Diagnosis Plan   1. 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)       Pathology:   Non small cell lung cancer bone mets    Past Medical History:   Diagnosis Date   • Anxiety    • Clot     POSSIBLE BLOOD CLOT IN VENOUS ACCESS DEVICE-PT STATES WAS STARTED ON ELIQUIS    • Dyspnea on exertion    •  Elevated cholesterol    • Frequent urination     DAY AND NIGHT   • SHAYY (generalized anxiety disorder)     RELATED HIGH BLOOD PRESSURE   • GERD (gastroesophageal reflux disease)    • High blood pressure     ANXIETY RELATED   • Hyperlipidemia    • Hypothyroidism    • Lung cancer (HCC)    • Lung nodule     LEFT   • Malignant neoplasm of upper-outer quadrant of right breast in female, estrogen receptor positive (HCC) 4/13/2021    PT STATES HAS BILAT BREAST CANCER   • Migraine    • PONV (postoperative nausea and vomiting)          Past Surgical History:   Procedure Laterality Date   • BREAST BIOPSY Bilateral 04/07/2021    Right Breast 10 o'clock & Left breast 10 o'clock 6 cm from nipple, [facility]   • CLOSED REDUCTION HIP DISLOCATION Left 4/30/2021    Procedure: BRONCHOSCOPY, LEFT VIDEO ASSITED THORACOSCOPY, ROBOTIC ASSSITED MEDIASTINAL LYMPHADENECTOMY WITH INTERCOSTAL NERVE BLOCKS;  Surgeon: Raphael Kerr III, MD;  Location: [facility];  Service: DaVinci;  Laterality: Left;   • COLONOSCOPY     • TUBAL ABDOMINAL LIGATION     • VENOUS ACCESS DEVICE (PORT) INSERTION Right 5/20/2021    Procedure: INSERTION VENOUS ACCESS DEVICE;  Surgeon: Raphael Kerr III, MD;  Location: [facility] OR;  Service: Thoracic;  Laterality: Right;   • VENOUS ACCESS DEVICE (PORT) INSERTION Right 11/29/2021    Procedure: REVISION AND REPLACEMENT RIGHT SUBCLAVIAN VENOUS ACCESS PORT;  Surgeon: Raphael Kerr III, MD;  Location: [facility] OR;  Service: Thoracic;  Laterality: Right;   • WRIST SURGERY Right          Current Facility-Administered Medications on File Prior to Visit   Medication Dose Route Frequency Provider Last Rate Last Admin   • acetaminophen (TYLENOL) tablet 650 mg  650 mg Oral Q4H PRN Kerrie Last MD   650 mg at 10/02/22 1502    Or   • acetaminophen (TYLENOL) 160 MG/5ML solution 650 mg  650 mg Oral Q4H PRN Kerrie Last MD        Or   • acetaminophen (TYLENOL) suppository 650 mg  650 mg Rectal Q4H PRN  Kerrie Last MD       • ALPRAZolam (XANAX) tablet 0.5 mg  0.5 mg Oral Nightly PRN Demetrice Bhatt MD   0.5 mg at 10/03/22 2053   • amLODIPine (NORVASC) tablet 2.5 mg  2.5 mg Oral Q24H Demterice Bhatt MD   2.5 mg at 10/04/22 0916   • polyethylene glycol (MIRALAX) packet 17 g  17 g Oral Daily Jelani Tran Jr., MD   17 g at 10/04/22 0916    And   • sennosides-docusate (PERICOLACE) 8.6-50 MG per tablet 2 tablet  2 tablet Oral BID Jelani Tran Jr., MD   2 tablet at 10/04/22 0916    And   • bisacodyl (DULCOLAX) EC tablet 5 mg  5 mg Oral Daily PRN Jelani Tran Jr., MD        And   • bisacodyl (DULCOLAX) suppository 10 mg  10 mg Rectal Daily PRN Jelani Tran Jr., MD       • buPROPion XL (WELLBUTRIN XL) 24 hr tablet 150 mg  150 mg Oral Daily Kerrie Last MD   150 mg at 10/04/22 0917   • calcitriol (ROCALTROL) capsule 1 mcg  1 mcg Oral Daily Kerrie Last MD   1 mcg at 10/04/22 0917   • dexamethasone (DECADRON) tablet 4 mg  4 mg Oral Daily With Breakfast Demetrice Bhatt MD   4 mg at 10/04/22 0916   • DULoxetine (CYMBALTA) DR capsule 30 mg  30 mg Oral Nightly Demetrice Bhatt MD   30 mg at 10/03/22 2053   • Enoxaparin Sodium (LOVENOX) syringe 40 mg  40 mg Subcutaneous Q24H Jelani Tran Jr., MD   40 mg at 10/03/22 1628   • folic acid (FOLVITE) tablet 1,000 mcg  1,000 mcg Oral Daily Kerrie Last MD   1,000 mcg at 10/04/22 0916   • heparin injection 500 Units  5 mL Intravenous PRN Ulises Wooten MD       • hydrALAZINE (APRESOLINE) tablet 10 mg  10 mg Oral Q6H PRN Harley Sheikh, DO       • HYDROmorphone (DILAUDID) injection 0.5 mg  0.5 mg Intravenous Q6H PRN Harley Sheikh DO   0.5 mg at 10/02/22 1216   • letrozole (FEMARA) tablet 2.5 mg  2.5 mg Oral Daily Kerrie Last MD   2.5 mg at 10/04/22 0917   • levothyroxine (SYNTHROID, LEVOTHROID) tablet 25 mcg  25 mcg Oral Q AM Kerrie Last MD   25 mcg at 10/04/22 0600   • Magnesium Sulfate 2  gram Bolus, followed by 8 gram infusion (total Mg dose 10 grams)- Mg less than or equal to 1mg/dL  2 g Intravenous PRN Harley Sheikh, DO        Or   • Magnesium Sulfate 2 gram / 50mL Infusion (GIVE X 3 BAGS TO EQUAL 6GM TOTAL DOSE) - Mg 1.1 - 1.5 mg/dl  2 g Intravenous PRN Harley Sheikh, DO        Or   • Magnesium Sulfate 4 gram infusion- Mg 1.6-1.9 mg/dL  4 g Intravenous PRN Harley Sheikh, DO       • ondansetron (ZOFRAN) injection 4 mg  4 mg Intravenous Q6H PRN Kerrie Last MD       • oxyCODONE (oxyCONTIN) 12 hr tablet 40 mg  40 mg Oral Q12H Eddie Cardenas MD   40 mg at 10/04/22 0917   • oxyCODONE (ROXICODONE) immediate release tablet 15 mg  15 mg Oral Q4H PRN Kerrie Last MD   15 mg at 10/04/22 0521   • pantoprazole (PROTONIX) EC tablet 40 mg  40 mg Oral QAM Kerrie Last MD   40 mg at 10/04/22 0600   • potassium & sodium phosphates (PHOS-NAK) 280-160-250 MG packet - for Phosphorus less than 1.25 mg/dL  2 packet Oral Q6H PRN Harley Sheikh DO   2 packet at 09/26/22 2007    Or   • potassium & sodium phosphates (PHOS-NAK) 280-160-250 MG packet - for Phosphorus 1.25 - 2.5 mg/dL  2 packet Oral Q6H PRN Harley Sheikh, DO   2 packet at 09/26/22 1227   • potassium & sodium phosphates (PHOS-NAK) oral packet  2 packet Oral BID AC Kerrie Last MD   2 packet at 10/04/22 0916   • potassium chloride (K-DUR,KLOR-CON) ER tablet 40 mEq  40 mEq Oral PRN Harley Sheikh, DO        Or   • potassium chloride (KLOR-CON) packet 40 mEq  40 mEq Oral PRN Harley Sheikh, DO        Or   • potassium chloride 20 mEq in 50 mL IVPB  20 mEq Intravenous Q1H PRN Harley Sheikh, DO       • prochlorperazine (COMPAZINE) tablet 10 mg  10 mg Oral Q6H PRN Stingmilvia, Kerrie Burch MD       • sodium chloride 0.9 % flush 10 mL  10 mL Intravenous Q12H Ulises Wooten MD   10 mL at 10/04/22 0918   • sodium chloride 0.9 % flush 10 mL  10 mL Intravenous PRN Ulises Wooten MD        • sodium chloride 0.9 % flush 20 mL  20 mL Intravenous PRN Ulises Wooten MD       • [DISCONTINUED] fentaNYL (DURAGESIC) 50 MCG/HR patch 1 patch  1 patch Transdermal Q72H Demetrice Bhatt MD   1 patch at 10/02/22 2148     Current Outpatient Medications on File Prior to Visit   Medication Sig Dispense Refill   • ALPRAZolam (XANAX) 0.5 MG tablet TAKE ONE TABLET BY MOUTH TWICE A DAY AS NEEDED FOR ANXIETY 60 tablet 1   • buPROPion XL (WELLBUTRIN XL) 150 MG 24 hr tablet Take 1 tablet by mouth Daily. 30 tablet 5   • calcitriol (ROCALTROL) 0.5 MCG capsule Take 2 capsules by mouth Daily. 60 capsule 0   • dexamethasone (DECADRON) 4 MG tablet Take 1 tablet by mouth Daily With Breakfast. 30 tablet 1   • escitalopram (Lexapro) 10 MG tablet Take 1 tablet by mouth Daily. 30 tablet 2   • fentaNYL (DURAGESIC) 50 MCG/HR patch Place 1 patch on the skin as directed by provider Every 72 (Seventy-Two) Hours for 30 days. 1 patch 10 patch 0   • folic acid (FOLVITE) 1 MG tablet TAKE ONE TABLET BY MOUTH DAILY 30 tablet 3   • gabapentin (NEURONTIN) 300 MG capsule Take 1 capsule by mouth 3 (Three) Times a Day. TAKE ONE CAPSULES BY MOUTH THREE TIMES A DAY (Patient taking differently: Take 300 mg by mouth 3 (Three) Times a Day. TAKE ONE CAPSULES BY MOUTH THREE TIMES A DAY    Patient taking differently. States was changed to 300mg twice a day) 180 capsule 3   • letrozole (FEMARA) 2.5 MG tablet Take 1 tablet by mouth Daily. 90 tablet 3   • levothyroxine (SYNTHROID, LEVOTHROID) 25 MCG tablet Take 1 tablet by mouth Daily. (Patient taking differently: Take 25 mcg by mouth Daily. Patient states she no longer takes this medication) 90 tablet 1   • omeprazole (PrilOSEC) 20 MG capsule Take 1 capsule by mouth 2 (Two) Times a Day. 180 capsule 1   • oxyCODONE (ROXICODONE) 15 MG immediate release tablet Take 1 tablet by mouth Every 4 (Four) Hours As Needed for Moderate Pain. 180 tablet 0   • potassium & sodium phosphates (Phos-NaK) 280-160-250 MG  pack packet Take 2 packets by mouth 2 (Two) Times a Day Before Meals. 120 packet 1   • prochlorperazine (COMPAZINE) 10 MG tablet Take 1 tablet by mouth Every 6 (Six) Hours As Needed for Nausea or Vomiting. 30 tablet 1   • rizatriptan (MAXALT) 10 MG tablet Take 1 tablet by mouth 1 (One) Time for 1 dose. AT ONSET OF HEADACHE MAY REPEAT ONE TABLET IN 2 HOURS IF NEEDED 12 tablet 5   • simvastatin (ZOCOR) 20 MG tablet Take 1 tablet by mouth Daily. 90 tablet 1       No Known Allergies      Referring Provider:    No referring provider defined for this encounter.    Oncologist:  No primary care provider on file.      Seen and approved by:  Patty Franklin MD  10/04/2022  Subjective     No ref. provider found    Cancer Staging

## 2022-10-04 NOTE — PLAN OF CARE
Goal Outcome Evaluation: Patient remained free of falls. Administered PRN pain medication x2. Pt reports pain more controlled than yesterday. Pt still has AMS but reports she thinks her mentation is improving. Vitals stable.

## 2022-10-04 NOTE — PLAN OF CARE
Goal Outcome Evaluation:         Pt did better today. Fell early this AM, CT showed no fracture at this time. Pt now has sitter in room with patient at all times. Pt verbalized increased anxiety today. This RN spoke with Dr. Mora, hydroxyzine ordered. Pt resting comfortably in bed at this time.

## 2022-10-05 ENCOUNTER — TREATMENT (OUTPATIENT)
Dept: RADIATION ONCOLOGY | Facility: HOSPITAL | Age: 57
End: 2022-10-05

## 2022-10-05 PROBLEM — N30.00 ACUTE CYSTITIS WITHOUT HEMATURIA: Status: ACTIVE | Noted: 2022-10-05

## 2022-10-05 LAB
ALBUMIN SERPL-MCNC: 3.4 G/DL (ref 3.5–5.2)
ALBUMIN/GLOB SERPL: 1.6 G/DL
ALP SERPL-CCNC: 107 U/L (ref 39–117)
ALT SERPL W P-5'-P-CCNC: 93 U/L (ref 1–33)
ANION GAP SERPL CALCULATED.3IONS-SCNC: 7.7 MMOL/L (ref 5–15)
AST SERPL-CCNC: 31 U/L (ref 1–32)
BASOPHILS # BLD AUTO: 0.01 10*3/MM3 (ref 0–0.2)
BASOPHILS NFR BLD AUTO: 0.1 % (ref 0–1.5)
BILIRUB SERPL-MCNC: 0.3 MG/DL (ref 0–1.2)
BUN SERPL-MCNC: 19 MG/DL (ref 6–20)
BUN/CREAT SERPL: 24.1 (ref 7–25)
CALCIUM SPEC-SCNC: 8.3 MG/DL (ref 8.6–10.5)
CHLORIDE SERPL-SCNC: 103 MMOL/L (ref 98–107)
CO2 SERPL-SCNC: 28.3 MMOL/L (ref 22–29)
CREAT SERPL-MCNC: 0.79 MG/DL (ref 0.57–1)
DEPRECATED RDW RBC AUTO: 55.3 FL (ref 37–54)
EGFRCR SERPLBLD CKD-EPI 2021: 86.3 ML/MIN/1.73
EOSINOPHIL # BLD AUTO: 0.06 10*3/MM3 (ref 0–0.4)
EOSINOPHIL NFR BLD AUTO: 0.5 % (ref 0.3–6.2)
ERYTHROCYTE [DISTWIDTH] IN BLOOD BY AUTOMATED COUNT: 17.9 % (ref 12.3–15.4)
GLOBULIN UR ELPH-MCNC: 2.1 GM/DL
GLUCOSE SERPL-MCNC: 71 MG/DL (ref 65–99)
HCT VFR BLD AUTO: 30.1 % (ref 34–46.6)
HGB BLD-MCNC: 9.7 G/DL (ref 12–15.9)
IMM GRANULOCYTES # BLD AUTO: 0.33 10*3/MM3 (ref 0–0.05)
IMM GRANULOCYTES NFR BLD AUTO: 2.7 % (ref 0–0.5)
LYMPHOCYTES # BLD AUTO: 0.33 10*3/MM3 (ref 0.7–3.1)
LYMPHOCYTES NFR BLD AUTO: 2.7 % (ref 19.6–45.3)
MAGNESIUM SERPL-MCNC: 2 MG/DL (ref 1.6–2.6)
MCH RBC QN AUTO: 28.5 PG (ref 26.6–33)
MCHC RBC AUTO-ENTMCNC: 32.2 G/DL (ref 31.5–35.7)
MCV RBC AUTO: 88.5 FL (ref 79–97)
MONOCYTES # BLD AUTO: 0.94 10*3/MM3 (ref 0.1–0.9)
MONOCYTES NFR BLD AUTO: 7.8 % (ref 5–12)
NEUTROPHILS NFR BLD AUTO: 10.41 10*3/MM3 (ref 1.7–7)
NEUTROPHILS NFR BLD AUTO: 86.2 % (ref 42.7–76)
NRBC BLD AUTO-RTO: 0 /100 WBC (ref 0–0.2)
PLATELET # BLD AUTO: 163 10*3/MM3 (ref 140–450)
PMV BLD AUTO: 10.3 FL (ref 6–12)
POTASSIUM SERPL-SCNC: 3.8 MMOL/L (ref 3.5–5.2)
PROT SERPL-MCNC: 5.5 G/DL (ref 6–8.5)
RAD ONC ARIA COURSE ID: NORMAL
RAD ONC ARIA COURSE INTENT: NORMAL
RAD ONC ARIA COURSE LAST TREATMENT DATE: NORMAL
RAD ONC ARIA COURSE START DATE: NORMAL
RAD ONC ARIA COURSE TREATMENT ELAPSED DAYS: 7
RAD ONC ARIA FIRST TREATMENT DATE: NORMAL
RAD ONC ARIA PLAN FRACTIONS TREATED TO DATE: 6
RAD ONC ARIA PLAN ID: NORMAL
RAD ONC ARIA PLAN PRESCRIBED DOSE PER FRACTION: 3 GY
RAD ONC ARIA PLAN PRIMARY REFERENCE POINT: NORMAL
RAD ONC ARIA PLAN TOTAL FRACTIONS PRESCRIBED: 10
RAD ONC ARIA PLAN TOTAL PRESCRIBED DOSE: 3000 CGY
RAD ONC ARIA REFERENCE POINT DOSAGE GIVEN TO DATE: 18 GY
RAD ONC ARIA REFERENCE POINT DOSAGE GIVEN TO DATE: 18.17 GY
RAD ONC ARIA REFERENCE POINT ID: NORMAL
RAD ONC ARIA REFERENCE POINT ID: NORMAL
RAD ONC ARIA REFERENCE POINT SESSION DOSAGE GIVEN: 3 GY
RAD ONC ARIA REFERENCE POINT SESSION DOSAGE GIVEN: 3.03 GY
RBC # BLD AUTO: 3.4 10*6/MM3 (ref 3.77–5.28)
SODIUM SERPL-SCNC: 139 MMOL/L (ref 136–145)
WBC NRBC COR # BLD: 12.08 10*3/MM3 (ref 3.4–10.8)

## 2022-10-05 PROCEDURE — 85025 COMPLETE CBC W/AUTO DIFF WBC: CPT | Performed by: INTERNAL MEDICINE

## 2022-10-05 PROCEDURE — 77427 RADIATION TX MANAGEMENT X5: CPT | Performed by: RADIOLOGY

## 2022-10-05 PROCEDURE — 63710000001 DEXAMETHASONE PER 0.25 MG: Performed by: INTERNAL MEDICINE

## 2022-10-05 PROCEDURE — 25010000002 CEFTRIAXONE PER 250 MG: Performed by: INTERNAL MEDICINE

## 2022-10-05 PROCEDURE — 99232 SBSQ HOSP IP/OBS MODERATE 35: CPT | Performed by: INTERNAL MEDICINE

## 2022-10-05 PROCEDURE — 25010000002 ENOXAPARIN PER 10 MG: Performed by: INTERNAL MEDICINE

## 2022-10-05 PROCEDURE — 97530 THERAPEUTIC ACTIVITIES: CPT

## 2022-10-05 PROCEDURE — 77386 CHG INTENSITY MODULATED RADIATION TX DLVR COMPLEX: CPT | Performed by: RADIOLOGY

## 2022-10-05 PROCEDURE — 77014 CHG CT GUIDANCE RADIATION THERAPY FLDS PLACEMENT: CPT | Performed by: RADIOLOGY

## 2022-10-05 PROCEDURE — 80053 COMPREHEN METABOLIC PANEL: CPT | Performed by: INTERNAL MEDICINE

## 2022-10-05 PROCEDURE — 83735 ASSAY OF MAGNESIUM: CPT | Performed by: INTERNAL MEDICINE

## 2022-10-05 PROCEDURE — 77386: CPT | Performed by: RADIOLOGY

## 2022-10-05 RX ORDER — MAGNESIUM SULFATE HEPTAHYDRATE 40 MG/ML
4 INJECTION, SOLUTION INTRAVENOUS AS NEEDED
Status: DISCONTINUED | OUTPATIENT
Start: 2022-10-05 | End: 2022-10-13 | Stop reason: HOSPADM

## 2022-10-05 RX ORDER — MAGNESIUM SULFATE 1 G/100ML
1 INJECTION INTRAVENOUS AS NEEDED
Status: DISCONTINUED | OUTPATIENT
Start: 2022-10-05 | End: 2022-10-13 | Stop reason: HOSPADM

## 2022-10-05 RX ORDER — MAGNESIUM SULFATE HEPTAHYDRATE 40 MG/ML
2 INJECTION, SOLUTION INTRAVENOUS AS NEEDED
Status: DISCONTINUED | OUTPATIENT
Start: 2022-10-05 | End: 2022-10-13 | Stop reason: HOSPADM

## 2022-10-05 RX ADMIN — DEXAMETHASONE 4 MG: 4 TABLET ORAL at 08:09

## 2022-10-05 RX ADMIN — OXYCODONE HYDROCHLORIDE 15 MG: 15 TABLET ORAL at 15:07

## 2022-10-05 RX ADMIN — DOCUSATE SODIUM 50MG AND SENNOSIDES 8.6MG 2 TABLET: 8.6; 5 TABLET, FILM COATED ORAL at 20:41

## 2022-10-05 RX ADMIN — Medication 1000 MCG: at 08:09

## 2022-10-05 RX ADMIN — AMLODIPINE BESYLATE 2.5 MG: 2.5 TABLET ORAL at 08:09

## 2022-10-05 RX ADMIN — Medication 2 PACKET: at 18:03

## 2022-10-05 RX ADMIN — OXYCODONE HYDROCHLORIDE 15 MG: 15 TABLET ORAL at 22:52

## 2022-10-05 RX ADMIN — POLYETHYLENE GLYCOL 3350 17 G: 17 POWDER, FOR SOLUTION ORAL at 08:10

## 2022-10-05 RX ADMIN — Medication 10 ML: at 20:42

## 2022-10-05 RX ADMIN — HYDROXYZINE HYDROCHLORIDE 25 MG: 25 TABLET ORAL at 08:10

## 2022-10-05 RX ADMIN — LETROZOLE 2.5 MG: 2.5 TABLET, FILM COATED ORAL at 08:10

## 2022-10-05 RX ADMIN — ENOXAPARIN SODIUM 40 MG: 100 INJECTION SUBCUTANEOUS at 15:07

## 2022-10-05 RX ADMIN — Medication 10 ML: at 09:59

## 2022-10-05 RX ADMIN — OXYCODONE HYDROCHLORIDE 15 MG: 15 TABLET ORAL at 06:08

## 2022-10-05 RX ADMIN — CALCITRIOL 1 MCG: 0.25 CAPSULE ORAL at 08:09

## 2022-10-05 RX ADMIN — DULOXETINE HYDROCHLORIDE 30 MG: 30 CAPSULE, DELAYED RELEASE ORAL at 20:43

## 2022-10-05 RX ADMIN — ALPRAZOLAM 0.5 MG: 0.5 TABLET ORAL at 20:41

## 2022-10-05 RX ADMIN — OXYCODONE HYDROCHLORIDE 15 MG: 15 TABLET ORAL at 19:18

## 2022-10-05 RX ADMIN — Medication 2 PACKET: at 08:09

## 2022-10-05 RX ADMIN — PANTOPRAZOLE SODIUM 40 MG: 40 TABLET, DELAYED RELEASE ORAL at 06:08

## 2022-10-05 RX ADMIN — OXYCODONE HYDROCHLORIDE 40 MG: 15 TABLET, FILM COATED, EXTENDED RELEASE ORAL at 20:41

## 2022-10-05 RX ADMIN — OXYCODONE HYDROCHLORIDE 40 MG: 15 TABLET, FILM COATED, EXTENDED RELEASE ORAL at 09:44

## 2022-10-05 RX ADMIN — BUPROPION HYDROCHLORIDE 150 MG: 150 TABLET, EXTENDED RELEASE ORAL at 08:09

## 2022-10-05 RX ADMIN — OXYCODONE HYDROCHLORIDE 15 MG: 15 TABLET ORAL at 02:32

## 2022-10-05 RX ADMIN — LEVOTHYROXINE SODIUM 25 MCG: 0.03 TABLET ORAL at 06:08

## 2022-10-05 RX ADMIN — CEFTRIAXONE SODIUM 1 G: 1 INJECTION, POWDER, FOR SOLUTION INTRAMUSCULAR; INTRAVENOUS at 09:56

## 2022-10-05 RX ADMIN — DOCUSATE SODIUM 50MG AND SENNOSIDES 8.6MG 2 TABLET: 8.6; 5 TABLET, FILM COATED ORAL at 08:09

## 2022-10-05 NOTE — PROGRESS NOTES
Name: Holli Patel ADMIT: 2022   : 1965  PCP: Vandana Gastelum MD    MRN: [id number] LOS: 12 days   AGE/SEX: 57 y.o. female  ROOM: [facility]   Subjective   No chief complaint on file.     No CP SOA NVD. Does report some dysuria this morning. No flank pain.    Objective   Vital Signs  Temp:  [96.6 °F (35.9 °C)-97.8 °F (36.6 °C)] 97.5 °F (36.4 °C)  Heart Rate:  [] 101  Resp:  [16-18] 18  BP: ()/(65-92) 109/79  SpO2:  [94 %-96 %] 96 %  on   ;   Device (Oxygen Therapy): room air  Body mass index is 28.12 kg/m².    Physical Exam  Vitals and nursing note reviewed.   Constitutional:       General: She is not in acute distress.     Appearance: She is ill-appearing. She is not diaphoretic.   HENT:      Head:      Comments: contusion right eye/scalp  Eyes:      General:         Right eye: No discharge.         Left eye: No discharge.      Conjunctiva/sclera: Conjunctivae normal.   Cardiovascular:      Rate and Rhythm: Normal rate and regular rhythm.      Pulses: Normal pulses.   Pulmonary:      Effort: Pulmonary effort is normal.      Breath sounds: No wheezing.   Abdominal:      General: There is no distension.      Palpations: Abdomen is soft.      Tenderness: There is no abdominal tenderness. There is no guarding or rebound.   Musculoskeletal:         General: Tenderness present. No swelling.      Cervical back: Neck supple. No tenderness.   Skin:     General: Skin is warm and dry.   Neurological:      Mental Status: She is alert.      Cranial Nerves: No cranial nerve deficit.   Psychiatric:         Attention and Perception: She is inattentive.         Mood and Affect: Mood normal.         Cognition and Memory: Memory is impaired.           I have reviewed the physical exam today and updated where needed as above.            Results Review:       I reviewed the patient's new clinical results.      Results from last 7 days   Lab Units 10/05/22  0546 10/04/22  0600 10/03/22  0503 10/02/22  0622    WBC 10*3/mm3 12.08* 14.84* 16.33* 16.98*   HEMOGLOBIN g/dL 9.7* 10.1* 9.3* 9.6*   PLATELETS 10*3/mm3 163 175 166 189     Results from last 7 days   Lab Units 10/05/22  0546 10/04/22  0600 10/03/22  0503 10/02/22  0622   SODIUM mmol/L 139 140 140 140   POTASSIUM mmol/L 3.8 4.1 4.4 4.9   CHLORIDE mmol/L 103 102 101 102   CO2 mmol/L 28.3 27.1 28.3 28.0   BUN mg/dL 19 21* 21* 22*   CREATININE mg/dL 0.79 0.83 0.82 0.89   GLUCOSE mg/dL 71 89 77 116*   Estimated Creatinine Clearance: 72.9 mL/min (by C-G formula based on SCr of 0.79 mg/dL).  Results from last 7 days   Lab Units 10/05/22  0546 10/04/22  0600 10/03/22  0503 10/02/22  0622   CALCIUM mg/dL 8.3* 8.6 8.7 9.0   ALBUMIN g/dL 3.40* 3.70 3.50 3.60   MAGNESIUM mg/dL  --   --  1.8  --    PHOSPHORUS mg/dL  --   --  3.4 4.1          amLODIPine, 2.5 mg, Oral, Q24H  buPROPion XL, 150 mg, Oral, Daily  calcitriol, 1 mcg, Oral, Daily  cefTRIAXone, 1 g, Intravenous, Q24H  dexamethasone, 4 mg, Oral, Daily With Breakfast  DULoxetine, 30 mg, Oral, Nightly  enoxaparin, 40 mg, Subcutaneous, Q24H  folic acid, 1,000 mcg, Oral, Daily  letrozole, 2.5 mg, Oral, Daily  levothyroxine, 25 mcg, Oral, Q AM  oxyCODONE, 40 mg, Oral, Q12H  pantoprazole, 40 mg, Oral, QAM  polyethylene glycol, 17 g, Oral, Daily   And  senna-docusate sodium, 2 tablet, Oral, BID  potassium & sodium phosphates, 2 packet, Oral, BID AC  sodium chloride, 10 mL, Intravenous, Q12H       Diet Regular; Cardiac    Assessment & Plan      Active Hospital Problems    Diagnosis  POA   • **Cancer related pain [G89.3]  Yes   • Acute cystitis without hematuria [N30.00]  No   • Stage 3a chronic kidney disease (HCC) [N18.31]  Yes   • 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       · Metastatic NSCLC with Cancer Related Pain:  Bone metastases. Undergoing palliative radiation therapy. Has been on Chemotherapy and Xgeva. Transitioned to long acting oxycodone with prn available from the fentanyl. Continues on cymbalta and dexamethasone. Improving pain. Oncology following.  · HTN: Acceptable acutely. Continue current regimen.  · Hypothyroidism: Synthroid. TSH ok on repeat.  · Thrush: Improved  · LFT: Liver US ok. Acute hepatitis panel negative.  · Metabolic Encephalopathy: 2/2 medications, hospitalization, improving  · Falls:   · Acute Cystitis: Is more alert today and does report dysuria. This is new and she describes it as mild. UA is infectious. Cx pending speciation/sensitivites. Start rocephin.  · PPx: Lovenox  · Disposition: TBD    Blaine Mora MD  [facility]  10/05/22  09:35 EDT    Dictated portions using Dragon dictation software.    During the entire encounter, I was wearing recommended PPE including face mask and eye protection. Hand sanitization was performed prior to entering room and upon exit.

## 2022-10-05 NOTE — PROGRESS NOTES
Nutrition Services    Patient Name:  Holli Patel  YOB: 1965  MRN: [id number]  Admit Date:  9/23/2022      Nutrition assessment.     CLINICAL NUTRITION ASSESSMENT      Reason for Assessment Length of Stay     Diagnosis/Problem   Metastatic non-small cell lung cancer  Cancer-related pain   Medical/Surgical History Past Medical History:   Diagnosis Date   • Anxiety    • Clot     POSSIBLE BLOOD CLOT IN VENOUS ACCESS DEVICE-PT STATES WAS STARTED ON ELIQUIS    • Dyspnea on exertion    • Elevated cholesterol    • Frequent urination     DAY AND NIGHT   • SHAYY (generalized anxiety disorder)     RELATED HIGH BLOOD PRESSURE   • GERD (gastroesophageal reflux disease)    • High blood pressure     ANXIETY RELATED   • Hyperlipidemia    • Hypothyroidism    • Lung cancer (HCC)    • Lung nodule     LEFT   • Malignant neoplasm of upper-outer quadrant of right breast in female, estrogen receptor positive (HCC) 4/13/2021    PT STATES HAS BILAT BREAST CANCER   • Migraine    • PONV (postoperative nausea and vomiting)        Past Surgical History:   Procedure Laterality Date   • BREAST BIOPSY Bilateral 04/07/2021    Right Breast 10 o'clock & Left breast 10 o'clock 6 cm from nipple, B[facility]   • CLOSED REDUCTION HIP DISLOCATION Left 4/30/2021    Procedure: BRONCHOSCOPY, LEFT VIDEO ASSITED THORACOSCOPY, ROBOTIC ASSSITED MEDIASTINAL LYMPHADENECTOMY WITH INTERCOSTAL NERVE BLOCKS;  Surgeon: Raphael Kerr III, MD;  Location: [facility];  Service: DaVinci;  Laterality: Left;   • COLONOSCOPY     • TUBAL ABDOMINAL LIGATION     • VENOUS ACCESS DEVICE (PORT) INSERTION Right 5/20/2021    Procedure: INSERTION VENOUS ACCESS DEVICE;  Surgeon: Raphael Kerr III, MD;  Location: [facility] OR;  Service: Thoracic;  Laterality: Right;   • VENOUS ACCESS DEVICE (PORT) INSERTION Right 11/29/2021    Procedure: REVISION AND REPLACEMENT RIGHT SUBCLAVIAN VENOUS ACCESS PORT;  Surgeon: Raphael Kerr III, MD;  Location: [facility] OR;   "Service: Thoracic;  Laterality: Right;   • WRIST SURGERY Right         Encounter Information        Nutrition/Diet History:     Food Preferences:    Supplements:    Factors Affecting Intake: decreased appetite     Anthropometrics        Current Height  Current Weight  BMI kg/m2 Height: 160 cm (63\")  Weight: 72 kg (158 lb 11.7 oz) (09/23/22 1900)  Body mass index is 28.12 kg/m².       Admission Weight 72 kg    Ideal Body Weight (IBW) 52.4 kg    Usual Body Weight (UBW)    Weight Change/Trend        Weight History Wt Readings from Last 30 Encounters:   09/23/22 1900 72 kg (158 lb 11.7 oz)   09/13/22 0853 71.6 kg (157 lb 12.8 oz)   09/06/22 1237 73.9 kg (163 lb)   08/30/22 1318 70.9 kg (156 lb 6.4 oz)   08/23/22 1353 71.2 kg (157 lb)   08/23/22 0848 71.4 kg (157 lb 4.8 oz)   08/16/22 0857 71.7 kg (158 lb)   08/11/22 1555 72.6 kg (160 lb)   08/03/22 1212 76.9 kg (169 lb 8.5 oz)   08/03/22 0652 74.8 kg (165 lb)   08/04/22 0637 76.7 kg (169 lb)   08/04/22 0638 76.7 kg (169 lb)   08/04/22 0639 76.7 kg (169 lb)   08/04/22 0639 76.7 kg (169 lb)   08/02/22 1155 75.4 kg (166 lb 3.2 oz)   07/26/22 1008 78.3 kg (172 lb 9.6 oz)   07/25/22 1055 78 kg (172 lb)   07/19/22 1310 76.7 kg (169 lb 1.6 oz)   07/14/22 1122 75.7 kg (166 lb 12.8 oz)   07/12/22 1134 76 kg (167 lb 9.6 oz)   07/06/22 1141 75.5 kg (166 lb 6.4 oz)   06/28/22 1306 75.6 kg (166 lb 11.2 oz)   06/07/22 0910 75.2 kg (165 lb 11.2 oz)   06/03/22 1149 74.8 kg (165 lb)   06/01/22 1455 75.5 kg (166 lb 6.4 oz)   05/31/22 1351 75.2 kg (165 lb 12.8 oz)   05/25/22 1046 74.4 kg (164 lb)   05/10/22 0843 74.4 kg (164 lb)   04/19/22 0841 76.5 kg (168 lb 9.6 oz)   04/04/22 1250 77 kg (169 lb 12.8 oz)   03/28/22 0826 77.9 kg (171 lb 11.2 oz)                 Tests/Procedures        Tests/Procedures Radiation Therapy     Labs       Pertinent Labs    Results from last 7 days   Lab Units 10/05/22  0546 10/04/22  0600 10/03/22  0503   SODIUM mmol/L 139 140 140   POTASSIUM mmol/L 3.8 4.1 " 4.4   CHLORIDE mmol/L 103 102 101   CO2 mmol/L 28.3 27.1 28.3   BUN mg/dL 19 21* 21*   CREATININE mg/dL 0.79 0.83 0.82   CALCIUM mg/dL 8.3* 8.6 8.7   BILIRUBIN mg/dL 0.3 0.4 0.3   ALK PHOS U/L 107 111 109   ALT (SGPT) U/L 93* 96* 97*   AST (SGOT) U/L 31 28 35*   GLUCOSE mg/dL 71 89 77     Results from last 7 days   Lab Units 10/05/22  0546 10/04/22  0600 10/03/22  0503   MAGNESIUM mg/dL 2.0  --  1.8   PHOSPHORUS mg/dL  --   --  3.4   HEMOGLOBIN g/dL 9.7*   < > 9.3*   HEMATOCRIT % 30.1*   < > 28.8*   WBC 10*3/mm3 12.08*   < > 16.33*    < > = values in this interval not displayed.     Results from last 7 days   Lab Units 10/05/22  0546 10/04/22  0600 10/03/22  0503 10/02/22  0622 10/01/22  0531   PLATELETS 10*3/mm3 163 175 166 189 186     COVID19   Date Value Ref Range Status   08/03/2022 Not Detected Not Detected - Ref. Range Final     Lab Results   Component Value Date    HGBA1C 5.20 08/04/2022          Medications           Scheduled Medications amLODIPine, 2.5 mg, Oral, Q24H  buPROPion XL, 150 mg, Oral, Daily  calcitriol, 1 mcg, Oral, Daily  cefTRIAXone, 1 g, Intravenous, Q24H  dexamethasone, 4 mg, Oral, Daily With Breakfast  DULoxetine, 30 mg, Oral, Nightly  enoxaparin, 40 mg, Subcutaneous, Q24H  folic acid, 1,000 mcg, Oral, Daily  letrozole, 2.5 mg, Oral, Daily  levothyroxine, 25 mcg, Oral, Q AM  oxyCODONE, 40 mg, Oral, Q12H  pantoprazole, 40 mg, Oral, QAM  polyethylene glycol, 17 g, Oral, Daily   And  senna-docusate sodium, 2 tablet, Oral, BID  potassium & sodium phosphates, 2 packet, Oral, BID AC  sodium chloride, 10 mL, Intravenous, Q12H       Infusions     PRN Medications •  acetaminophen **OR** acetaminophen **OR** acetaminophen  •  ALPRAZolam  •  senna-docusate sodium **AND** polyethylene glycol **AND** bisacodyl **AND** bisacodyl  •  heparin  •  hydrALAZINE  •  HYDROmorphone  •  hydrOXYzine  •  magnesium sulfate **OR** magnesium sulfate in D5W 1g/100mL (PREMIX) **OR** magnesium sulfate  •  ondansetron  •   oxyCODONE  •  potassium & sodium phosphates **OR** potassium & sodium phosphates  •  potassium chloride **OR** potassium chloride **OR** potassium chloride  •  prochlorperazine  •  sodium chloride  •  sodium chloride     Physical Findings        Physical Appearance Generalized weakness     NFPE Not applicable   --  Edema  no edema   Gastrointestinal hypoactive bowel sounds, last bowel movement:10/4   Tubes/Drains none   Oral/Mouth Cavity WNL   Skin bruising   --  Current Nutrition Orders & Evaluation of Intake       Oral Nutrition     Food Allergies NKFA   Current PO Diet Diet Regular; Cardiac   Supplement Boost Plus, TID   PO Evaluation     Trending % PO Intake        --  PES STATEMENT / NUTRITION DIAGNOSIS      Nutrition Dx Problem  Problem: Inadequate Nutrient Intake  Etiology: Medical Diagnosis  Signs/Symptoms: Report of Minimal PO Intake    Comment:    --  NUTRITION INTERVENTION      Intervention Goal(s) Meet estimated needs         RD Intervention/Action Interview for preferences, Supplement provided, Encourage intake, Follow Tx Progress and Care plan reviewed         Prescription/Orders:       PO Diet       Supplements       Enteral Nutrition       Parenteral Nutrition    New Prescription Ordered?    --      Monitor/Evaluation Per protocol   Education Will instruct as appropriate   --    RD to follow per protocol.      Electronically signed by:  Deepti Unger RD  10/05/22 12:53 EDT

## 2022-10-05 NOTE — THERAPY TREATMENT NOTE
Patient Name: Holli Patel  : 1965    MRN: [id number]                              Today's Date: 10/5/2022       Admit Date: 2022    Visit Dx:     ICD-10-CM ICD-9-CM   1. 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)  C34.90 162.9    C79.51 198.5     Patient Active Problem List   Diagnosis   • History of gastroesophageal reflux (GERD)   • Primary insomnia   • Mixed hyperlipidemia   • Other specified hypothyroidism   • Arthritis   • Anxiety   • History of migraine   • Essential hypertension   • History of colon polyps   • Diverticulosis   • Leukocytosis   • Personal history of tobacco use   • Osteopenia   • Dyspnea   • Malignant neoplasm of upper-outer quadrant of right breast in female, estrogen receptor positive (HCC)   • Malignant neoplasm of upper-inner quadrant of left breast in female, estrogen receptor positive (HCC)   • Primary adenocarcinoma of upper lobe of left lung (HCC)   • Encounter for fitting and adjustment of vascular catheter   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)   • 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)   • History of bilateral breast cancer   • History of DVT (deep vein thrombosis)   • Stage 3a chronic kidney disease (HCC)   • Confusion   • Chemotherapy-induced neutropenia (HCC)   • Metabolic acidosis   • Ataxia   • Bony metastasis (HCC)   • Hypopotassemia   • Hypophosphatemia   • Hypomagnesemia   • Cancer related pain   • Acute cystitis without hematuria     Past Medical History:   Diagnosis Date   • Anxiety    • Clot     POSSIBLE BLOOD CLOT IN VENOUS ACCESS DEVICE-PT STATES WAS STARTED ON ELIQUIS    • Dyspnea on exertion    • Elevated cholesterol    • Frequent urination     DAY AND NIGHT   • SHAYY (generalized anxiety disorder)     RELATED HIGH BLOOD PRESSURE   • GERD (gastroesophageal reflux disease)    • High blood pressure     ANXIETY RELATED   • Hyperlipidemia    • Hypothyroidism    • Lung cancer (HCC)    • Lung nodule     LEFT    • Malignant neoplasm of upper-outer quadrant of right breast in female, estrogen receptor positive (HCC) 4/13/2021    PT STATES HAS BILAT BREAST CANCER   • Migraine    • PONV (postoperative nausea and vomiting)      Past Surgical History:   Procedure Laterality Date   • BREAST BIOPSY Bilateral 04/07/2021    Right Breast 10 o'clock & Left breast 10 o'clock 6 cm from nipple, BHL   • CLOSED REDUCTION HIP DISLOCATION Left 4/30/2021    Procedure: BRONCHOSCOPY, LEFT VIDEO ASSITED THORACOSCOPY, ROBOTIC ASSSITED MEDIASTINAL LYMPHADENECTOMY WITH INTERCOSTAL NERVE BLOCKS;  Surgeon: Raphael Kerr III, MD;  Location: [facility];  Service: DaVinci;  Laterality: Left;   • COLONOSCOPY     • TUBAL ABDOMINAL LIGATION     • VENOUS ACCESS DEVICE (PORT) INSERTION Right 5/20/2021    Procedure: INSERTION VENOUS ACCESS DEVICE;  Surgeon: Raphael Kerr III, MD;  Location: [facility];  Service: Thoracic;  Laterality: Right;   • VENOUS ACCESS DEVICE (PORT) INSERTION Right 11/29/2021    Procedure: REVISION AND REPLACEMENT RIGHT SUBCLAVIAN VENOUS ACCESS PORT;  Surgeon: Raphael Kerr III, MD;  Location: [facility];  Service: Thoracic;  Laterality: Right;   • WRIST SURGERY Right       General Information     Row Name 10/05/22 1145          Physical Therapy Time and Intention    Document Type therapy note (daily note)  -PH     Mode of Treatment physical therapy  -     Row Name 10/05/22 1145          General Information    Existing Precautions/Restrictions fall  -     Row Name 10/05/22 1145          Safety Issues, Functional Mobility    Impairments Affecting Function (Mobility) balance;endurance/activity tolerance;strength  -     Comment, Safety Issues/Impairments (Mobility) gt belt and non skid socks donned  -           User Key  (r) = Recorded By, (t) = Taken By, (c) = Cosigned By    Initials Name Provider Type    PH Yamileth Thomas PTA Physical Therapist Assistant               Mobility     Row Name  10/05/22 1146          Bed Mobility    Bed Mobility supine-sit  -PH     Supine-Sit [place] (Bed Mobility) minimum assist (75% patient effort);verbal cues  -PH     Sit-Supine [place] (Bed Mobility) verbal cues;contact guard;minimum assist (75% patient effort)  -PH     Assistive Device (Bed Mobility) bed rails;head of bed elevated  -PH     Row Name 10/05/22 1146          Sit-Stand Transfer    Sit-Stand [place] (Transfers) contact guard;verbal cues  -PH     Assistive Device (Sit-Stand Transfers) walker, front-wheeled  -PH     Row Name 10/05/22 1146          Gait/Stairs (Locomotion)    [place] Level (Gait) contact guard;verbal cues  -PH     Assistive Device (Gait) walker, front-wheeled  -PH     Distance in Feet (Gait) 70'  -PH     Deviations/Abnormal Patterns (Gait) yves decreased;gait speed decreased;stride length decreased  -PH     Bilateral Gait Deviations forward flexed posture;heel strike decreased  -PH     [place] Level (Stairs) unable to assess  -PH     Comment, (Gait/Stairs) improved coordination today w/ no scissoring noted; steadier w/ no LOB; spouse also holding gt belt although pt was assist of 1  -PH           User Key  (r) = Recorded By, (t) = Taken By, (c) = Cosigned By    Initials Name Provider Type    PH Yamileth Thomas PTA Physical Therapist Assistant               Obj/Interventions     Row Name 10/05/22 1148          Motor Skills    Therapeutic Exercise other (see comments)  refusal 2/2 R calf pain  -PH     Row Name 10/05/22 1148          Balance    Balance Assessment sitting static balance;standing static balance  -PH     Static Sitting Balance supervision;contact guard  -PH     Static Standing Balance contact guard  -PH     Position/Device Used, Standing Balance walker, front-wheeled  -PH           User Key  (r) = Recorded By, (t) = Taken By, (c) = Cosigned By    Initials Name Provider Type    Yamileth [place] PTA Physical Therapist Assistant                Goals/Plan    No documentation.                Clinical Impression     Row Name 10/05/22 1148          Pain    Posttreatment Pain Rating 6/10  -PH     Pain Location - Side/Orientation Right  -PH     Pain Location - calf  -PH     Pre/Posttreatment Pain Comment incr pelvic pain w/ sitting/activity  -PH     Pain Intervention(s) Repositioned;Rest  -PH     Additional Documentation Pain Scale: Numbers Pre/Post-Treatment (Group)  -PH     Row Name 10/05/22 1148          Plan of Care Review    Plan of Care Reviewed With patient;spouse  -PH     Progress improving  -PH     Outcome Evaluation Pt in bed at beg of PT session. Pt reporting R calf pain again today. Pt sat up to EOB req min A. Pt then stood and amb 70' req CGA and use of fww. Pt slow although steadier and no scissoring noted. Pt limited by weakness and fatigue. Pt refused ther ex 2/2 R calf pain and returned to bed req min A. PT will prog as pt salima.  -PH     Row Name 10/05/22 1148          Positioning and Restraints    Pre-Treatment Position in bed  -PH     Post Treatment Position bed  -PH     In Bed fowlers;call light within reach;encouraged to call for assist;exit alarm on;with family/caregiver  sitter in room  -PH           User Key  (r) = Recorded By, (t) = Taken By, (c) = Cosigned By    Initials Name Provider Type    PH Yamileth Thomas PTA Physical Therapist Assistant               Outcome Measures     Row Name 10/05/22 1151          How much help from another person do you currently need...    Turning from your back to your side while in flat bed without using bedrails? 3  -PH     Moving from lying on back to sitting on the side of a flat bed without bedrails? 3  -PH     Moving to and from a bed to a chair (including a wheelchair)? 3  -PH     Standing up from a chair using your arms (e.g., wheelchair, bedside chair)? 3  -PH     Climbing 3-5 steps with a railing? 2  -PH     To walk in hospital room? 3  -PH     AM-PAC 6 Clicks Score (PT) 17  -PH      Highest level of mobility 5 --> Static standing  -     Row Name 10/05/22 1151          Functional Assessment    Outcome Measure Options AM-PAC 6 Clicks Basic Mobility (PT)  -           User Key  (r) = Recorded By, (t) = Taken By, (c) = Cosigned By    Initials Name Provider Type     Yamileth Thomas PTA Physical Therapist Assistant                             Physical Therapy Education                 Title: PT OT SLP Therapies (Done)     Topic: Physical Therapy (Done)     Point: Mobility training (Done)     Learning Progress Summary           Patient Acceptance, E,D, VU,NR by  at 10/5/2022 1151   Family Acceptance, E, VU by  at 9/29/2022 1157      Show all documentation for this point (10)                 Point: Home exercise program (Done)     Learning Progress Summary           Patient Acceptance, E, VU by  at 10/4/2022 1944   Family Acceptance, E, VU by  at 9/29/2022 1157      Show all documentation for this point (7)                 Point: Body mechanics (Done)     Learning Progress Summary           Patient Acceptance, E,D, VU,NR by  at 10/5/2022 1151   Family Acceptance, E, VU by  at 9/29/2022 1157      Show all documentation for this point (10)                 Point: Precautions (Done)     Learning Progress Summary           Patient Acceptance, E,D, VU,NR by  at 10/5/2022 1151   Family Acceptance, E, VU by  at 9/29/2022 1157      Show all documentation for this point (10)                             User Key     Initials Effective Dates Name Provider Type [facility] 06/16/21 -  Yamileth Thomas PTA Physical Therapist Assistant PT     09/22/22 -  Karma Parham, RN Registered Nurse Nurse              PT Recommendation and Plan     Plan of Care Reviewed With: patient, spouse  Progress: improving  Outcome Evaluation: Pt in bed at beg of PT session. Pt reporting R calf pain again today. Pt sat up to EOB req min A. Pt then stood and amb 70' req CGA and use of fww. Pt  slow although steadier and no scissoring noted. Pt limited by weakness and fatigue. Pt refused ther ex 2/2 R calf pain and returned to bed req min A. PT will prog as pt salima.     Time Calculation:    PT Charges     Row Name 10/05/22 1151             Time Calculation    Start Time 1120  -PH      Stop Time 1130  -PH      Time Calculation (min) 10 min  -PH      PT Received On 10/05/22  -PH      PT - Next Appointment 10/06/22  -PH              Timed Charges    55004 - PT Therapeutic Activity Minutes 10  -PH              Total Minutes    Timed Charges Total Minutes 10  -PH       Total Minutes 10  -PH            User Key  (r) = Recorded By, (t) = Taken By, (c) = Cosigned By    Initials Name Provider Type    PH Yamileth Thomas PTA Physical Therapist Assistant              Therapy Charges for Today     Code Description Service Date Service Provider Modifiers Qty    [id number]  PT THERAPEUTIC ACT EA 15 MIN 10/5/2022 Yamileth Thomas PTA GP 1          PT G-Codes  Outcome Measure Options: AM-PAC 6 Clicks Basic Mobility (PT)  AM-PAC 6 Clicks Score (PT): 17  AM-PAC 6 Clicks Score (OT): 12    Yamileth Thomas PTA  10/5/2022

## 2022-10-05 NOTE — PROGRESS NOTES
[facility] GROUP INPATIENT PROGRESS NOTE    Length of Stay:  12 days    CHIEF COMPLAINT/REASON FOR VISIT:  Metastatic non-small cell lung cancer  Cancer-related pain    SUBJECTIVE:  Afebrile. Normotensive. On room air. Pain is improved and she ambulated with PT earlier today. Decreased appetite.     ROS:  14 systems reviewed with pertinent positives and negatives in the HPI. Reviewed today.    OBJECTIVE:  Vitals:    10/04/22 2001 10/05/22 0000 10/05/22 0500 10/05/22 0928   BP: 125/92 120/81 98/65 109/79   BP Location: Left arm Left arm Right arm Left arm   Patient Position: Lying Lying Lying Lying   Pulse: 107 95 96 101   Resp: 18 16 16 18   Temp: 97.8 °F (36.6 °C) 96.6 °F (35.9 °C) 97.5 °F (36.4 °C) 97.5 °F (36.4 °C)   TempSrc: Oral Oral Oral Oral   SpO2: 96% 96% 96% 96%   Weight:       Height:             PHYSICAL EXAMINATION:    General: NAD, alert, talkative  HEENT: R scalp and deandre-orbital hematoma, stable  Chest/Lungs: CTAB. Right chest mediport in place  Heart: RRR  Abdomen/GI: soft, NT, ND, NABS  Extremities: WWP, no edema or tenderness    DIAGNOSTIC DATA:  Results Review:     I reviewed the patient's new clinical results.    Results from last 7 days   Lab Units 10/05/22  0546   WBC 10*3/mm3 12.08*   HEMOGLOBIN g/dL 9.7*   HEMATOCRIT % 30.1*   PLATELETS 10*3/mm3 163     Lab Results   Component Value Date    NEUTROABS 10.41 (H) 10/05/2022     Results from last 7 days   Lab Units 10/05/22  0546   SODIUM mmol/L 139   POTASSIUM mmol/L 3.8   CHLORIDE mmol/L 103   CO2 mmol/L 28.3   BUN mg/dL 19   CREATININE mg/dL 0.79   GLUCOSE mg/dL 71   CALCIUM mg/dL 8.3*         Results from last 7 days   Lab Units 10/05/22  0546   MAGNESIUM mg/dL 2.0         IMAGING:    MRI Pelvis With & Without Contrast (09/24/2022 17:26)    CT Head Without Contrast (10/04/2022 07:20)    ASSESSMENT:  This is a 57 y.o. female with:     *Metastatic non-small cell lung cancer  · She was initially diagnosed with stage III (T1N2M0)  disease.    · She received initial therapy with cisplatin and Alimta concurrent with radiation therapy beginning 5/25/2021.    · There were indeterminate findings in the lumbar spine at L1 and it was recommended to monitor this over time.    · Patient completed 3 cycles cisplatin and Alimta 7/7/2021 and completed radiation therapy 7/12/2021.    · PET scan 8/6/2021 showed good response to treatment.    · MRI lumbar spine 10/1/2021 with increase in size of the L1 lesion.    · Biopsy of the L1 lesion on 10/14/2022 confirmed metastatic adenocarcinoma of lung origin, PD-L1 TPS 0.    · PET scan 10/26/2021 with involvement of left adrenal and L1/L2 only.    · Patient received radiation therapy to L1/L2 on 11/19/2021.  She also received radiation to the left adrenal gland.    · She initiated further systemic therapy on 11/22/2021 with Keytruda and Alimta.    · Follow-up PET scan 1/18/2022 with decrease in small left upper lobe pulmonary nodule, resolution of activity at L1/L2, no new sites of disease.    · Guardant 360 CT DNA level was monitored and was decreasing.    · PET scan 4/11/2022 with progression in left adrenal and L1.    · Maintained systemic therapy with Keytruda and Alimta and received additional radiation to left adrenal and L1.  Alimta however held since 5/10/2022 due to renal dysfunction.  Radiation completed to lumbar spine 7/14/2022.    · PET scan 7/18/2022 with multiple areas of progression of the spine and right sacrum.    · Guardant 360 analysis 7/20/2022 with no actionable mutations.    · Change in therapy to single agent palliative Taxotere initiated 7/26/2022.    · Altered mental status and febrile neutropenia requiring hospitalization 8/2 - 8/9/2022.  During that admission, MRI brain, cervical, thoracic, lumbar spine performed 8/4/2022 in addition to CT cervical spine 8/5/2022.  There was evidence of C2 metastatic lesion with some dural enhancement, compression deformity at T7 with mild edema  suggesting acute to subacute fracture, 5 mm T12 spinous process metastasis, multifocal disease in the lumbar spine and sacrum, largest involving sacral ala to the right 5 cm in transverse dimension.  Loss of height at L2 25%.  · Patient received cycle 3 Taxotere 9/6/2022 with Neulasta support.    · She has been continuing as well on Xgeva every 4 weeks (last received 9/6/2022).  · Patient did undergo MRI lumbar spine and pelvis on 9/24/2022.  MRI pelvis showed bilateral sacral fractures with marrow infiltration related to metastasis more prominent on the right.  Lesion on the right 3.5 x 4.1 x 4.3 cm.  Also probable metastasis along posterior acetabulum on the right 1.5 cm.  MRI lumbar spine with stable L1 metastasis, new edema endplate T12 possibly stress reaction versus developing new metastasis, lesion at T12 spinous process unchanged.   · Radiation oncology consulted with plans for palliative treatment of the right sacral metastasis.  Treatment initiated 9/28/2022  · Patient was going to have a PET scan which was delayed because she was admitted.     *Bilateral stage I breast cancer  · Patient with bilateral breast cancer, both stage I ([id number]), grade 2, ER/TX positive and HER2/melinda negative with low Ki-67.    · Patient initiated adjuvant letrozole in May 2021.  · Patient continues to take letrozole daily.     *Cancer related pain  · Patient has been receiving Duragesic patch 50 mcg every 72 hours in addition to oxycodone 15 mg every 4 hours for breakthrough pain.  Duragesic dose had been escalated recently in the outpatient setting due to worsening pain  · On admission 9/23/2022, Duragesic patch increased to 75 mcg daily, added Decadron 4 mg IV every 6 hours with continuation of oxycodone 15 mg every 4 hours as needed for breakthrough pain in addition of Dilaudid 1 mg every 2 hours as needed for breakthrough pain.  · Patient with increasing side effects from narcotics, Duragesic decreased on 9/25/2022 from 75  down to 50 mcg patch every 72 hours.  · Dexamethasone dose decreased to 4 mg p.o. every 12 hours on 9/27/2022  · Initiated palliative radiation to the right sacrum on 9/28/2022  · Patient became confused after receiving Dilaudid.  Patient and family asked to discontinue Dilaudid.  · Duragesic patch dose was increased to 75 mcg/h on 9/30/2022. Subsequent decrease to 50 mcg due to confusion  · Gabapentin was discontinued due to the sedating effect.  · Oxycodone is being used for breakthrough pain.  · 10/3 transition to sustained release oxycodone 40 mg bid and fentanyl patch stopped  · 10/5: pain acceptable and  Doing well.      *Anxiety/depression  · Patient was previously on Wellbutrin  mg daily, Lexapro 10 mg daily.  · On 9/30/2022, she was switched from Lexapro to Cymbalta 30 mg daily.     *Difficulty with sleep.  · Patient was previously on Xanax but reported that it was not working.  · Patient previously tried melatonin and it was not effective.  · She was placed on Restoril 15 mg nightly on 10/1/2022.  · The patient had 3 falls since last night.  · Its not clear if this is due to Restoril but since this happened after the change, we switched back to Xanax-Home dose of 0.5 mg as needed for sleep.     *Anemia  · Most recent evaluation 8/3/2022 with iron 15, ferritin 276, iron saturation 5%, TIBC 328, folate greater than 20, B12 392  · Anemia felt to be related to malignancy/chronic disease and chemotherapy  · Hemoglobin has remained in the 8-10 range  · Hemoglobin is stable at 9.7 today.     *Peripheral neuropathy  · Patient was on gabapentin 300 mg twice daily.  · Gabapentin was discontinued on 9/30/2022.  · She is currently on Cymbalta.     *Narcotic induced constipation  · Patient is on Sybil-Colace 2 tablets twice daily with MiraLAX daily   · Patient is now having bowel movements.     *GI prophylaxis  · Protonix 40 mg daily     *DVT prophylaxis  · Lovenox 40 mg daily     *Elevated liver  labs  · Atorvastatin was discontinued  · ?  Due to Diflucan (given to treat thrush).    · Improving    *Delirium  · Neurology following  · ? Hospital delirium +/- medications  · Improved    *BLE calf pain  · BLE venous duplex negative. Exam reassurring    *Fall with right scalp hematoma    *Deconditioning  · PT following. ? Subacute rehabilitation    RECOMMENDATIONS:  1. Continue radiation  2. Continue sustained release oxycydone 40 mg q12h, added on 10/3 and fentanyl patch stopped. Pain doing well.  3. Continue prn oxycodone. She's using less of this over the past few days  4. Cymbalta at 30 mg daily  5. Continue dexamethasone at 4 mg in the morning  6. Xanax qhs prn  7. F/u with Dr. Jacobs as an outpatient when possible    Discussed with the patient. Will follow. Discussed with her  at the bedside today. Discussed with pharmacist.       Eddie Cardenas MD

## 2022-10-05 NOTE — PLAN OF CARE
Goal Outcome Evaluation:         Pt did well today. Pt verbalized less anxiety throughout the day today versus previous days. Pt ambulated with physical therapy in room and tolerated well. Pain medication given x3 today and pt states pain is more manageable.

## 2022-10-05 NOTE — PLAN OF CARE
Goal Outcome Evaluation:  Plan of Care Reviewed With: patient, spouse        Progress: improving  Outcome Evaluation: Pt in bed at beg of PT session. Pt reporting R calf pain again today. Pt sat up to EOB req min A. Pt then stood and amb 70' req CGA and use of fww. Pt slow although steadier and no scissoring noted. Pt limited by weakness and fatigue. Pt refused ther ex 2/2 R calf pain and returned to bed req min A. PT will prog as pt salima.    Patient was intermittently wearing a face mask during this therapy encounter. Therapist used appropriate personal protective equipment including mask and gloves.  Mask used was standard procedure mask. Appropriate PPE was worn during the entire therapy session. Hand hygiene was completed before and after therapy session. Patient is not in enhanced droplet precautions.

## 2022-10-06 ENCOUNTER — TREATMENT (OUTPATIENT)
Dept: RADIATION ONCOLOGY | Facility: HOSPITAL | Age: 57
End: 2022-10-06

## 2022-10-06 LAB
ALBUMIN SERPL-MCNC: 3.6 G/DL (ref 3.5–5.2)
ALBUMIN/GLOB SERPL: 1.9 G/DL
ALP SERPL-CCNC: 102 U/L (ref 39–117)
ALT SERPL W P-5'-P-CCNC: 96 U/L (ref 1–33)
ANION GAP SERPL CALCULATED.3IONS-SCNC: 11 MMOL/L (ref 5–15)
AST SERPL-CCNC: 29 U/L (ref 1–32)
BACTERIA SPEC AEROBE CULT: ABNORMAL
BASOPHILS # BLD AUTO: 0.02 10*3/MM3 (ref 0–0.2)
BASOPHILS NFR BLD AUTO: 0.2 % (ref 0–1.5)
BILIRUB SERPL-MCNC: 0.3 MG/DL (ref 0–1.2)
BUN SERPL-MCNC: 19 MG/DL (ref 6–20)
BUN/CREAT SERPL: 22.6 (ref 7–25)
CALCIUM SPEC-SCNC: 8.4 MG/DL (ref 8.6–10.5)
CHLORIDE SERPL-SCNC: 103 MMOL/L (ref 98–107)
CO2 SERPL-SCNC: 26 MMOL/L (ref 22–29)
CREAT SERPL-MCNC: 0.84 MG/DL (ref 0.57–1)
DEPRECATED RDW RBC AUTO: 55 FL (ref 37–54)
EGFRCR SERPLBLD CKD-EPI 2021: 80.2 ML/MIN/1.73
EOSINOPHIL # BLD AUTO: 0.1 10*3/MM3 (ref 0–0.4)
EOSINOPHIL NFR BLD AUTO: 0.8 % (ref 0.3–6.2)
ERYTHROCYTE [DISTWIDTH] IN BLOOD BY AUTOMATED COUNT: 17.6 % (ref 12.3–15.4)
GLOBULIN UR ELPH-MCNC: 1.9 GM/DL
GLUCOSE SERPL-MCNC: 79 MG/DL (ref 65–99)
HCT VFR BLD AUTO: 30.2 % (ref 34–46.6)
HGB BLD-MCNC: 9.9 G/DL (ref 12–15.9)
IMM GRANULOCYTES # BLD AUTO: 0.3 10*3/MM3 (ref 0–0.05)
IMM GRANULOCYTES NFR BLD AUTO: 2.5 % (ref 0–0.5)
LYMPHOCYTES # BLD AUTO: 0.47 10*3/MM3 (ref 0.7–3.1)
LYMPHOCYTES NFR BLD AUTO: 3.9 % (ref 19.6–45.3)
MAGNESIUM SERPL-MCNC: 2.1 MG/DL (ref 1.6–2.6)
MCH RBC QN AUTO: 28.9 PG (ref 26.6–33)
MCHC RBC AUTO-ENTMCNC: 32.8 G/DL (ref 31.5–35.7)
MCV RBC AUTO: 88.3 FL (ref 79–97)
MONOCYTES # BLD AUTO: 0.97 10*3/MM3 (ref 0.1–0.9)
MONOCYTES NFR BLD AUTO: 8.1 % (ref 5–12)
NEUTROPHILS NFR BLD AUTO: 10.14 10*3/MM3 (ref 1.7–7)
NEUTROPHILS NFR BLD AUTO: 84.5 % (ref 42.7–76)
NRBC BLD AUTO-RTO: 0 /100 WBC (ref 0–0.2)
PLATELET # BLD AUTO: 184 10*3/MM3 (ref 140–450)
PMV BLD AUTO: 10.9 FL (ref 6–12)
POTASSIUM SERPL-SCNC: 3.8 MMOL/L (ref 3.5–5.2)
PROT SERPL-MCNC: 5.5 G/DL (ref 6–8.5)
RAD ONC ARIA COURSE ID: NORMAL
RAD ONC ARIA COURSE INTENT: NORMAL
RAD ONC ARIA COURSE LAST TREATMENT DATE: NORMAL
RAD ONC ARIA COURSE START DATE: NORMAL
RAD ONC ARIA COURSE TREATMENT ELAPSED DAYS: 8
RAD ONC ARIA FIRST TREATMENT DATE: NORMAL
RAD ONC ARIA PLAN FRACTIONS TREATED TO DATE: 7
RAD ONC ARIA PLAN ID: NORMAL
RAD ONC ARIA PLAN PRESCRIBED DOSE PER FRACTION: 3 GY
RAD ONC ARIA PLAN PRIMARY REFERENCE POINT: NORMAL
RAD ONC ARIA PLAN TOTAL FRACTIONS PRESCRIBED: 10
RAD ONC ARIA PLAN TOTAL PRESCRIBED DOSE: 3000 CGY
RAD ONC ARIA REFERENCE POINT DOSAGE GIVEN TO DATE: 21 GY
RAD ONC ARIA REFERENCE POINT DOSAGE GIVEN TO DATE: 21.2 GY
RAD ONC ARIA REFERENCE POINT ID: NORMAL
RAD ONC ARIA REFERENCE POINT ID: NORMAL
RAD ONC ARIA REFERENCE POINT SESSION DOSAGE GIVEN: 3 GY
RAD ONC ARIA REFERENCE POINT SESSION DOSAGE GIVEN: 3.03 GY
RBC # BLD AUTO: 3.42 10*6/MM3 (ref 3.77–5.28)
SODIUM SERPL-SCNC: 140 MMOL/L (ref 136–145)
WBC NRBC COR # BLD: 12 10*3/MM3 (ref 3.4–10.8)

## 2022-10-06 PROCEDURE — 83735 ASSAY OF MAGNESIUM: CPT | Performed by: INTERNAL MEDICINE

## 2022-10-06 PROCEDURE — 85025 COMPLETE CBC W/AUTO DIFF WBC: CPT | Performed by: INTERNAL MEDICINE

## 2022-10-06 PROCEDURE — 97530 THERAPEUTIC ACTIVITIES: CPT

## 2022-10-06 PROCEDURE — 77386: CPT | Performed by: RADIOLOGY

## 2022-10-06 PROCEDURE — 63710000001 DEXAMETHASONE PER 0.25 MG: Performed by: INTERNAL MEDICINE

## 2022-10-06 PROCEDURE — 99233 SBSQ HOSP IP/OBS HIGH 50: CPT | Performed by: INTERNAL MEDICINE

## 2022-10-06 PROCEDURE — 77014 CHG CT GUIDANCE RADIATION THERAPY FLDS PLACEMENT: CPT | Performed by: RADIOLOGY

## 2022-10-06 PROCEDURE — 77386 CHG INTENSITY MODULATED RADIATION TX DLVR COMPLEX: CPT | Performed by: RADIOLOGY

## 2022-10-06 PROCEDURE — 25010000002 ENOXAPARIN PER 10 MG: Performed by: INTERNAL MEDICINE

## 2022-10-06 PROCEDURE — 25010000002 CEFTRIAXONE PER 250 MG: Performed by: INTERNAL MEDICINE

## 2022-10-06 PROCEDURE — 97110 THERAPEUTIC EXERCISES: CPT | Performed by: OCCUPATIONAL THERAPIST

## 2022-10-06 PROCEDURE — 80053 COMPREHEN METABOLIC PANEL: CPT | Performed by: INTERNAL MEDICINE

## 2022-10-06 RX ORDER — CALCITRIOL 0.5 UG/1
CAPSULE, LIQUID FILLED ORAL
Qty: 60 CAPSULE | Refills: 0 | Status: SHIPPED | OUTPATIENT
Start: 2022-10-06 | End: 2022-11-30

## 2022-10-06 RX ORDER — HYDROMORPHONE HYDROCHLORIDE 1 MG/ML
0.5 INJECTION, SOLUTION INTRAMUSCULAR; INTRAVENOUS; SUBCUTANEOUS EVERY 6 HOURS PRN
Status: CANCELLED | OUTPATIENT
Start: 2022-10-06 | End: 2022-10-13

## 2022-10-06 RX ADMIN — LEVOTHYROXINE SODIUM 25 MCG: 0.03 TABLET ORAL at 05:56

## 2022-10-06 RX ADMIN — LETROZOLE 2.5 MG: 2.5 TABLET, FILM COATED ORAL at 08:18

## 2022-10-06 RX ADMIN — HYDROXYZINE HYDROCHLORIDE 25 MG: 25 TABLET ORAL at 01:25

## 2022-10-06 RX ADMIN — CALCITRIOL 1 MCG: 0.25 CAPSULE ORAL at 08:18

## 2022-10-06 RX ADMIN — Medication 2 PACKET: at 16:38

## 2022-10-06 RX ADMIN — DOCUSATE SODIUM 50MG AND SENNOSIDES 8.6MG 2 TABLET: 8.6; 5 TABLET, FILM COATED ORAL at 21:08

## 2022-10-06 RX ADMIN — Medication 10 ML: at 21:11

## 2022-10-06 RX ADMIN — DEXAMETHASONE 4 MG: 4 TABLET ORAL at 08:14

## 2022-10-06 RX ADMIN — DULOXETINE HYDROCHLORIDE 30 MG: 30 CAPSULE, DELAYED RELEASE ORAL at 21:08

## 2022-10-06 RX ADMIN — AMLODIPINE BESYLATE 2.5 MG: 2.5 TABLET ORAL at 08:18

## 2022-10-06 RX ADMIN — OXYCODONE HYDROCHLORIDE 15 MG: 15 TABLET ORAL at 05:56

## 2022-10-06 RX ADMIN — BUPROPION HYDROCHLORIDE 150 MG: 150 TABLET, EXTENDED RELEASE ORAL at 08:18

## 2022-10-06 RX ADMIN — Medication 10 ML: at 08:18

## 2022-10-06 RX ADMIN — Medication 1000 MCG: at 08:19

## 2022-10-06 RX ADMIN — OXYCODONE HYDROCHLORIDE 15 MG: 15 TABLET ORAL at 19:15

## 2022-10-06 RX ADMIN — CEFTRIAXONE SODIUM 1 G: 1 INJECTION, POWDER, FOR SOLUTION INTRAMUSCULAR; INTRAVENOUS at 08:15

## 2022-10-06 RX ADMIN — OXYCODONE HYDROCHLORIDE 40 MG: 15 TABLET, FILM COATED, EXTENDED RELEASE ORAL at 21:10

## 2022-10-06 RX ADMIN — PANTOPRAZOLE SODIUM 40 MG: 40 TABLET, DELAYED RELEASE ORAL at 05:57

## 2022-10-06 RX ADMIN — ENOXAPARIN SODIUM 40 MG: 100 INJECTION SUBCUTANEOUS at 14:14

## 2022-10-06 RX ADMIN — POLYETHYLENE GLYCOL 3350 17 G: 17 POWDER, FOR SOLUTION ORAL at 08:14

## 2022-10-06 RX ADMIN — OXYCODONE HYDROCHLORIDE 40 MG: 15 TABLET, FILM COATED, EXTENDED RELEASE ORAL at 09:12

## 2022-10-06 RX ADMIN — Medication 2 PACKET: at 08:19

## 2022-10-06 RX ADMIN — DOCUSATE SODIUM 50MG AND SENNOSIDES 8.6MG 2 TABLET: 8.6; 5 TABLET, FILM COATED ORAL at 08:14

## 2022-10-06 RX ADMIN — OXYCODONE HYDROCHLORIDE 15 MG: 15 TABLET ORAL at 14:14

## 2022-10-06 NOTE — PROGRESS NOTES
[facility] GROUP INPATIENT PROGRESS NOTE    Length of Stay:  13 days    CHIEF COMPLAINT/REASON FOR VISIT:  Metastatic non-small cell lung cancer  Cancer-related pain    SUBJECTIVE:  Afebrile. Mildly tachycardic at times. Normotensive. On room air. States pain generally well controlled with medication but worse when she's ambulating.     ROS:  14 systems reviewed with pertinent positives and negatives in the HPI. Reviewed today.    OBJECTIVE:  Vitals:    10/05/22 2300 10/06/22 0300 10/06/22 0500 10/06/22 0748   BP: 122/86 122/88  117/84   BP Location: Left arm Left arm  Left arm   Patient Position: Lying Lying  Lying   Pulse: 95 88  108   Resp: 18 18  18   Temp: 97.2 °F (36.2 °C) 97.1 °F (36.2 °C)  97 °F (36.1 °C)   TempSrc: Oral Oral  Oral   SpO2: 98% 99%  96%   Weight:   68.1 kg (150 lb 2.1 oz)    Height:             PHYSICAL EXAMINATION:    General: NAD, alert, answers questions appropriately  HEENT: R scalp and deandre-orbital hematoma, stable again today  Chest/Lungs: CTAB. Right chest mediport in place  Heart: RRR  Abdomen/GI: soft, NT, ND, NABS  Extremities: WWP, no edema or tenderness    DIAGNOSTIC DATA:  Results Review:     I reviewed the patient's new clinical results.    Results from last 7 days   Lab Units 10/06/22  0557   WBC 10*3/mm3 12.00*   HEMOGLOBIN g/dL 9.9*   HEMATOCRIT % 30.2*   PLATELETS 10*3/mm3 184     Lab Results   Component Value Date    NEUTROABS 10.14 (H) 10/06/2022     Results from last 7 days   Lab Units 10/06/22  0557   SODIUM mmol/L 140   POTASSIUM mmol/L 3.8   CHLORIDE mmol/L 103   CO2 mmol/L 26.0   BUN mg/dL 19   CREATININE mg/dL 0.84   GLUCOSE mg/dL 79   CALCIUM mg/dL 8.4*         Results from last 7 days   Lab Units 10/06/22  0557   MAGNESIUM mg/dL 2.1         IMAGING:    MRI Pelvis With & Without Contrast (09/24/2022 17:26)    CT Head Without Contrast (10/04/2022 07:20)    ASSESSMENT:  This is a 57 y.o. female with:     *Metastatic non-small cell lung cancer  · She was  initially diagnosed with stage III (T1N2M0) disease.    · She received initial therapy with cisplatin and Alimta concurrent with radiation therapy beginning 5/25/2021.    · There were indeterminate findings in the lumbar spine at L1 and it was recommended to monitor this over time.    · Patient completed 3 cycles cisplatin and Alimta 7/7/2021 and completed radiation therapy 7/12/2021.    · PET scan 8/6/2021 showed good response to treatment.    · MRI lumbar spine 10/1/2021 with increase in size of the L1 lesion.    · Biopsy of the L1 lesion on 10/14/2022 confirmed metastatic adenocarcinoma of lung origin, PD-L1 TPS 0.    · PET scan 10/26/2021 with involvement of left adrenal and L1/L2 only.    · Patient received radiation therapy to L1/L2 on 11/19/2021.  She also received radiation to the left adrenal gland.    · She initiated further systemic therapy on 11/22/2021 with Keytruda and Alimta.    · Follow-up PET scan 1/18/2022 with decrease in small left upper lobe pulmonary nodule, resolution of activity at L1/L2, no new sites of disease.    · Guardant 360 CT DNA level was monitored and was decreasing.    · PET scan 4/11/2022 with progression in left adrenal and L1.    · Maintained systemic therapy with Keytruda and Alimta and received additional radiation to left adrenal and L1.  Alimta however held since 5/10/2022 due to renal dysfunction.  Radiation completed to lumbar spine 7/14/2022.    · PET scan 7/18/2022 with multiple areas of progression of the spine and right sacrum.    · Guardant 360 analysis 7/20/2022 with no actionable mutations.    · Change in therapy to single agent palliative Taxotere initiated 7/26/2022.    · Altered mental status and febrile neutropenia requiring hospitalization 8/2 - 8/9/2022.  During that admission, MRI brain, cervical, thoracic, lumbar spine performed 8/4/2022 in addition to CT cervical spine 8/5/2022.  There was evidence of C2 metastatic lesion with some dural enhancement,  compression deformity at T7 with mild edema suggesting acute to subacute fracture, 5 mm T12 spinous process metastasis, multifocal disease in the lumbar spine and sacrum, largest involving sacral ala to the right 5 cm in transverse dimension.  Loss of height at L2 25%.  · Patient received cycle 3 Taxotere 9/6/2022 with Neulasta support.    · She has been continuing as well on Xgeva every 4 weeks (last received 9/6/2022).  · Patient did undergo MRI lumbar spine and pelvis on 9/24/2022.  MRI pelvis showed bilateral sacral fractures with marrow infiltration related to metastasis more prominent on the right.  Lesion on the right 3.5 x 4.1 x 4.3 cm.  Also probable metastasis along posterior acetabulum on the right 1.5 cm.  MRI lumbar spine with stable L1 metastasis, new edema endplate T12 possibly stress reaction versus developing new metastasis, lesion at T12 spinous process unchanged.   · Radiation oncology consulted with plans for palliative treatment of the right sacral metastasis.  Treatment initiated 9/28/2022  · Patient was going to have a PET scan which was delayed because she was admitted.     *History of bilateral stage I breast cancer  · Patient with bilateral breast cancer, both stage I ([id number]), grade 2, ER/HI positive and HER2/melinda negative with low Ki-67.    · Patient initiated adjuvant letrozole in May 2021.  · Patient continues to take letrozole daily.     *Cancer related pain  · Patient has been receiving Duragesic patch 50 mcg every 72 hours in addition to oxycodone 15 mg every 4 hours for breakthrough pain.  Duragesic dose had been escalated recently in the outpatient setting due to worsening pain  · On admission 9/23/2022, Duragesic patch increased to 75 mcg daily, added Decadron 4 mg IV every 6 hours with continuation of oxycodone 15 mg every 4 hours as needed for breakthrough pain in addition of Dilaudid 1 mg every 2 hours as needed for breakthrough pain.  · Patient with increasing side effects  from narcotics, Duragesic decreased on 9/25/2022 from 75 down to 50 mcg patch every 72 hours.  · Dexamethasone dose decreased to 4 mg p.o. every 12 hours on 9/27/2022  · Initiated palliative radiation to the right sacrum on 9/28/2022  · Patient became confused after receiving Dilaudid.  Patient and family asked to discontinue Dilaudid.  · Duragesic patch dose was increased to 75 mcg/h on 9/30/2022. Subsequent decrease to 50 mcg due to confusion  · Gabapentin was discontinued due to the sedating effect.  · Oxycodone is being used for breakthrough pain.  · 10/3 transition to sustained release oxycodone 40 mg bid and fentanyl patch stopped  · 10/6: pain acceptable and  Doing well. Took a couple of more oxycodone yesterday prn than the couple of days prior     *Anxiety/depression  · Patient was previously on Wellbutrin  mg daily, Lexapro 10 mg daily.  · On 9/30/2022, she was switched from Lexapro to Cymbalta 30 mg daily.  · Atarax initiated for restlessness, improved     *Difficulty with sleep.  · Patient was previously on Xanax but reported that it was not working.  · Patient previously tried melatonin and it was not effective.  · She was placed on Restoril 15 mg nightly on 10/1/2022.  · The patient had 3 falls since last night.  · Its not clear if this is due to Restoril but since this happened after the change, we switched back to Xanax-Home dose of 0.5 mg as needed for sleep.     *Anemia  · Most recent evaluation 8/3/2022 with iron 15, ferritin 276, iron saturation 5%, TIBC 328, folate greater than 20, B12 392  · Anemia felt to be related to malignancy/chronic disease and chemotherapy  · Hemoglobin has remained in the 8-10 range  · Hemoglobin is stable at 9.9 today.     *Peripheral neuropathy  · Patient was on gabapentin 300 mg twice daily.  · Gabapentin was discontinued on 9/30/2022.  · She is currently on Cymbalta.     *Narcotic induced constipation  · Patient is on Sybil-Colace 2 tablets twice daily with  MiraLAX daily   · Patient is now having bowel movements.     *GI prophylaxis  · Protonix 40 mg daily     *DVT prophylaxis  · Lovenox 40 mg daily     *Elevated liver labs  · Atorvastatin was discontinued  · ?  Due to Diflucan (given to treat thrush).    · Improving    *Delirium  · Neurology following  · ? Hospital delirium +/- medications  · Improved    *BLE calf pain  · BLE venous duplex negative. Exam reassurring    *Fall with right scalp hematoma    *Deconditioning  · PT following. ? Subacute rehabilitation    *UTI  · Symptomatic   · Ucx with E coli  · On rocephin    RECOMMENDATIONS:  1. Continue radiation, fx 7/10 today  2. Continue sustained release oxycydone 40 mg q12h, added on 10/3 and fentanyl patch stopped. Pain improved.  3. Continue prn oxycodone. She used more of this, 5 doses, on 10/5 (2-3 doses/day the two days prior to that)  4. Cymbalta at 30 mg daily  5. Continue dexamethasone at 4 mg in the morning  6. Xanax qhs prn  7. On rocephin for UTI  8. At this point I would anticipate subacute rehabilitation and then outpatient with Dr. Jacobs as an outpatient when possible with an outpatient PET scan done prior to that. If there is progression, no standard therapy options remain and then there would be consideration of referral for a clinical trial if she's able.     Discussed with the patient and her . Discussed with Nirali Calhoun. Discussed with Dr. Snell who is in agreement with that plan. Will follow. 35 minutes coordinating care today.       Eddie Cardenas MD

## 2022-10-06 NOTE — PROGRESS NOTES
Name: Holli Patel ADMIT: 2022   : 1965  PCP: Vandana Gastelum MD    MRN: [id number] LOS: 13 days   AGE/SEX: 57 y.o. female  [facility]   Subjective   No chief complaint on file.     No CP SOA NVD. Dysuria improved. Pain is mostly controlled with only occasional shooting pain.    Objective   Vital Signs  Temp:  [97 °F (36.1 °C)-98.1 °F (36.7 °C)] 97.3 °F (36.3 °C)  Heart Rate:  [] 102  Resp:  [18] 18  BP: (105-122)/(72-88) 105/72  SpO2:  [96 %-99 %] 97 %  on   ;   Device (Oxygen Therapy): room air  Body mass index is 26.59 kg/m².    Physical Exam  Vitals and nursing note reviewed.   Constitutional:       General: She is not in acute distress.     Appearance: She is ill-appearing. She is not diaphoretic.   HENT:      Head:      Comments: contusion right eye/scalp  Eyes:      General:         Right eye: No discharge.         Left eye: No discharge.      Conjunctiva/sclera: Conjunctivae normal.   Cardiovascular:      Rate and Rhythm: Normal rate and regular rhythm.      Pulses: Normal pulses.   Pulmonary:      Effort: Pulmonary effort is normal.      Breath sounds: No wheezing.   Abdominal:      General: There is no distension.      Palpations: Abdomen is soft.      Tenderness: There is no abdominal tenderness. There is no guarding or rebound.   Musculoskeletal:         General: Tenderness present. No swelling.      Cervical back: Neck supple. No tenderness.   Skin:     General: Skin is warm and dry.   Neurological:      Mental Status: She is alert.      Cranial Nerves: No cranial nerve deficit.   Psychiatric:         Mood and Affect: Mood normal.         Behavior: Behavior normal.         Cognition and Memory: Memory is impaired.       I have reviewed the physical exam today and updated where needed as above.      Results Review:       I reviewed the patient's new clinical results.      Results from last 7 days   Lab Units 10/06/22  0557 10/05/22  0546 10/04/22  0600 10/03/22  0503   WBC  10*3/mm3 12.00* 12.08* 14.84* 16.33*   HEMOGLOBIN g/dL 9.9* 9.7* 10.1* 9.3*   PLATELETS 10*3/mm3 184 163 175 166     Results from last 7 days   Lab Units 10/06/22  0557 10/05/22  0546 10/04/22  0600 10/03/22  0503   SODIUM mmol/L 140 139 140 140   POTASSIUM mmol/L 3.8 3.8 4.1 4.4   CHLORIDE mmol/L 103 103 102 101   CO2 mmol/L 26.0 28.3 27.1 28.3   BUN mg/dL 19 19 21* 21*   CREATININE mg/dL 0.84 0.79 0.83 0.82   GLUCOSE mg/dL 79 71 89 77   Estimated Creatinine Clearance: 66.8 mL/min (by C-G formula based on SCr of 0.84 mg/dL).  Results from last 7 days   Lab Units 10/06/22  0557 10/05/22  0546 10/04/22  0600 10/03/22  0503 10/02/22  0622   CALCIUM mg/dL 8.4* 8.3* 8.6 8.7 9.0   ALBUMIN g/dL 3.60 3.40* 3.70 3.50 3.60   MAGNESIUM mg/dL 2.1 2.0  --  1.8  --    PHOSPHORUS mg/dL  --   --   --  3.4 4.1          amLODIPine, 2.5 mg, Oral, Q24H  buPROPion XL, 150 mg, Oral, Daily  calcitriol, 1 mcg, Oral, Daily  cefTRIAXone, 1 g, Intravenous, Q24H  dexamethasone, 4 mg, Oral, Daily With Breakfast  DULoxetine, 30 mg, Oral, Nightly  enoxaparin, 40 mg, Subcutaneous, Q24H  folic acid, 1,000 mcg, Oral, Daily  letrozole, 2.5 mg, Oral, Daily  levothyroxine, 25 mcg, Oral, Q AM  oxyCODONE, 40 mg, Oral, Q12H  pantoprazole, 40 mg, Oral, QAM  polyethylene glycol, 17 g, Oral, Daily   And  senna-docusate sodium, 2 tablet, Oral, BID  potassium & sodium phosphates, 2 packet, Oral, BID AC  sodium chloride, 10 mL, Intravenous, Q12H       Diet Regular; Cardiac    Assessment & Plan      Active Hospital Problems    Diagnosis  POA   • **Cancer related pain [G89.3]  Yes   • Acute cystitis without hematuria [N30.00]  No   • Stage 3a chronic kidney disease (HCC) [N18.31]  Yes   • 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       · Metastatic NSCLC  with Cancer Related Pain: Bone metastases. Undergoing palliative radiation therapy. Has been on Chemotherapy and Xgeva. Transitioned to long acting oxycodone with prn available from the fentanyl. Continues on cymbalta and dexamethasone. Improving pain. Oncology following.  · HTN: Acceptable acutely. Continue current regimen.  · Hypothyroidism: Synthroid. TSH ok on repeat.  · Thrush: Improved  · LFT: Liver US ok. Acute hepatitis panel negative.  · Metabolic Encephalopathy: 2/2 medications, hospitalization, improving  · Falls:   · Acute Cystitis: Pansensitive Ecoli. Continue rocephin.   · PPx: Lovenox  · Disposition: TBD/looking near discharge appropriateness, ok to start working on disposition options    Blaine Mora MD  [facility]  10/06/22  14:49 EDT    Dictated portions using Dragon dictation software.    During the entire encounter, I was wearing recommended PPE including face mask and eye protection. Hand sanitization was performed prior to entering room and upon exit.

## 2022-10-06 NOTE — THERAPY TREATMENT NOTE
Patient Name: Holli Patel  : 1965    MRN: [id number]                              Today's Date: 10/6/2022       Admit Date: 2022    Visit Dx:     ICD-10-CM ICD-9-CM   1. 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)  C34.90 162.9    C79.51 198.5     Patient Active Problem List   Diagnosis   • History of gastroesophageal reflux (GERD)   • Primary insomnia   • Mixed hyperlipidemia   • Other specified hypothyroidism   • Arthritis   • Anxiety   • History of migraine   • Essential hypertension   • History of colon polyps   • Diverticulosis   • Leukocytosis   • Personal history of tobacco use   • Osteopenia   • Dyspnea   • Malignant neoplasm of upper-outer quadrant of right breast in female, estrogen receptor positive (HCC)   • Malignant neoplasm of upper-inner quadrant of left breast in female, estrogen receptor positive (HCC)   • Primary adenocarcinoma of upper lobe of left lung (HCC)   • Encounter for fitting and adjustment of vascular catheter   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)   • 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)   • History of bilateral breast cancer   • History of DVT (deep vein thrombosis)   • Stage 3a chronic kidney disease (HCC)   • Confusion   • Chemotherapy-induced neutropenia (HCC)   • Metabolic acidosis   • Ataxia   • Bony metastasis (HCC)   • Hypopotassemia   • Hypophosphatemia   • Hypomagnesemia   • Cancer related pain   • Acute cystitis without hematuria     Past Medical History:   Diagnosis Date   • Anxiety    • Clot     POSSIBLE BLOOD CLOT IN VENOUS ACCESS DEVICE-PT STATES WAS STARTED ON ELIQUIS    • Dyspnea on exertion    • Elevated cholesterol    • Frequent urination     DAY AND NIGHT   • SHAYY (generalized anxiety disorder)     RELATED HIGH BLOOD PRESSURE   • GERD (gastroesophageal reflux disease)    • High blood pressure     ANXIETY RELATED   • Hyperlipidemia    • Hypothyroidism    • Lung cancer (HCC)    • Lung nodule     LEFT    • Malignant neoplasm of upper-outer quadrant of right breast in female, estrogen receptor positive (HCC) 4/13/2021    PT STATES HAS BILAT BREAST CANCER   • Migraine    • PONV (postoperative nausea and vomiting)      Past Surgical History:   Procedure Laterality Date   • BREAST BIOPSY Bilateral 04/07/2021    Right Breast 10 o'clock & Left breast 10 o'clock 6 cm from nipple, [facility]   • CLOSED REDUCTION HIP DISLOCATION Left 4/30/2021    Procedure: BRONCHOSCOPY, LEFT VIDEO ASSITED THORACOSCOPY, ROBOTIC ASSSITED MEDIASTINAL LYMPHADENECTOMY WITH INTERCOSTAL NERVE BLOCKS;  Surgeon: Raphael Kerr III, MD;  Location: [facility];  Service: DaVinci;  Laterality: Left;   • COLONOSCOPY     • TUBAL ABDOMINAL LIGATION     • VENOUS ACCESS DEVICE (PORT) INSERTION Right 5/20/2021    Procedure: INSERTION VENOUS ACCESS DEVICE;  Surgeon: Raphael Kerr III, MD;  Location: [facility];  Service: Thoracic;  Laterality: Right;   • VENOUS ACCESS DEVICE (PORT) INSERTION Right 11/29/2021    Procedure: REVISION AND REPLACEMENT RIGHT SUBCLAVIAN VENOUS ACCESS PORT;  Surgeon: Raphael Kerr III, MD;  Location: [facility];  Service: Thoracic;  Laterality: Right;   • WRIST SURGERY Right       General Information     Row Name 10/06/22 1440          OT Time and Intention    Document Type therapy note (daily note)  -     Mode of Treatment occupational therapy;individual therapy  -     Row Name 10/06/22 1440          General Information    Existing Precautions/Restrictions fall  -     Row Name 10/06/22 1440          Cognition    Orientation Status (Cognition) oriented x 3  -     Row Name 10/06/22 1440          Safety Issues, Functional Mobility    Impairments Affecting Function (Mobility) balance;endurance/activity tolerance;strength  -           User Key  (r) = Recorded By, (t) = Taken By, (c) = Cosigned By    Initials Name Provider Type     Gaby Odom, OTR Occupational Therapist                  Mobility/ADL's     Row Name 10/06/22 1441          Bed Mobility    Supine-Sit [place] (Bed Mobility) set up;standby assist  -     Sit-Supine [place] (Bed Mobility) set up;standby assist  -     Assistive Device (Bed Mobility) bed rails;head of bed elevated  -     Row Name 10/06/22 1441          Transfers    Transfers sit-stand transfer;stand-sit transfer  -     Bed-Chair [place] (Transfers) not tested  -     Sit-Stand [place] (Transfers) set up;minimum assist (75% patient effort);contact guard  -     Stand-Sit [place] (Transfers) set up;minimum assist (75% patient effort);contact guard  -     Row Name 10/06/22 1441          Sit-Stand Transfer    Assistive Device (Sit-Stand Transfers) walker, front-wheeled  -     Row Name 10/06/22 1441          Functional Mobility    Functional Mobility- Ind. Level contact guard assist;minimum assist (75% patient effort)  -     Functional Mobility- Device walker, front-wheeled  -     Functional Mobility- Comment in room, into hallway to column and back to bed  -           User Key  (r) = Recorded By, (t) = Taken By, (c) = Cosigned By    Initials Name Provider Type     Gaby Odom, OTR Occupational Therapist               Obj/Interventions     Row Name 10/06/22 1442          Shoulder (Therapeutic Exercise)    Shoulder AROM (Therapeutic Exercise) right;left;flexion;extension;15 repititions  -     Row Name 10/06/22 1442          Elbow/Forearm (Therapeutic Exercise)    Elbow/Forearm AROM (Therapeutic Exercise) left;right;flexion;extension;10 repetitions;supine  -[facility] Name 10/06/22 1442          Balance    Static Sitting Balance set-up;standby assist  -     Dynamic Sitting Balance set-up;standby assist  -     Static Standing Balance set-up;contact guard;minimal assist  -     Balance Interventions standing;sitting;sit to stand;static;supported  -           User Key  (r) = Recorded By, (t) = Taken By, (c) = Cosigned  By    Initials Name Provider Type    Gaby Emmanuel OTR Occupational Therapist               Goals/Plan    No documentation.                Clinical Impression     Row Name 10/06/22 1443          Pain Assessment    Pretreatment Pain Rating 0/10 - no pain  -     Posttreatment Pain Rating 0/10 - no pain  -     Row Name 10/06/22 1443          Plan of Care Review    Plan of Care Reviewed With patient  -     Progress improving  -     Outcome Evaluation pt worked w OT in tx session. pt completed sup to sit w SBA, sat EOB SBA, stood w walker and walked into hallway to column and back w min/CGA w walker. pt fatigues quickly but completed UE ex in bed 10x1. pt cont to progress in therapy.  -     Row Name 10/06/22 1443          Positioning and Restraints    Pre-Treatment Position in bed  -KP     Post Treatment Position bed  -KP     In Bed fowlers;call light within reach;encouraged to call for assist;with other staff  w sitter  -           User Key  (r) = Recorded By, (t) = Taken By, (c) = Cosigned By    Initials Name Provider Type    Gaby Emmanuel OTR Occupational Therapist               Outcome Measures     Row Name 10/06/22 1444          How much help from another is currently needed...    Putting on and taking off regular lower body clothing? 2  -KP     Bathing (including washing, rinsing, and drying) 2  -KP     Toileting (which includes using toilet bed pan or urinal) 2  -KP     Putting on and taking off regular upper body clothing 3  -KP     Taking care of personal grooming (such as brushing teeth) 3  -KP     Eating meals 3  -KP     AM-PAC 6 Clicks Score (OT) 15  -     Row Name 10/06/22 1444          Functional Assessment    Outcome Measure Options AM-PAC 6 Clicks Daily Activity (OT)  -           User Key  (r) = Recorded By, (t) = Taken By, (c) = Cosigned By    Initials Name Provider Type    Gaby Emmanuel OTR Occupational Therapist                Occupational Therapy  Education                 Title: PT OT SLP Therapies (Done)     Topic: Occupational Therapy (Done)     Point: ADL training (Done)     Description:   Instruct learner(s) on proper safety adaptation and remediation techniques during self care or transfers.   Instruct in proper use of assistive devices.              Learning Progress Summary           Patient Acceptance, E, VU by HF at 10/5/2022 1931   Family Acceptance, E, VU by HF at 9/29/2022 1157      Show all documentation for this point (5)                 Point: Home exercise program (Done)     Description:   Instruct learner(s) on appropriate technique for monitoring, assisting and/or progressing therapeutic exercises/activities.              Learning Progress Summary           Patient Acceptance, E, VU by HF at 10/5/2022 1931   Family Acceptance, E, VU by HF at 9/29/2022 1157      Show all documentation for this point (5)                 Point: Precautions (Done)     Description:   Instruct learner(s) on prescribed precautions during self-care and functional transfers.              Learning Progress Summary           Patient Acceptance, E, VU by HF at 10/5/2022 1931   Family Acceptance, E, VU by HF at 9/29/2022 1157      Show all documentation for this point (5)                 Point: Body mechanics (Done)     Description:   Instruct learner(s) on proper positioning and spine alignment during self-care, functional mobility activities and/or exercises.              Learning Progress Summary           Patient Acceptance, E, VU by HF at 10/5/2022 1931   Family Acceptance, E, VU by HF at 9/29/2022 1157      Show all documentation for this point (5)                             User Key     Initials Effective Dates Name Provider Type Discipline     09/22/22 -  Karma Parham, RN Registered Nurse Nurse              OT Recommendation and Plan  Planned Therapy Interventions (OT): activity tolerance training, functional balance retraining, occupation/activity based  interventions, BADL retraining, transfer/mobility retraining, strengthening exercise, ROM/therapeutic exercise  Therapy Frequency (OT): 3 times/wk  Plan of Care Review  Plan of Care Reviewed With: patient  Progress: improving  Outcome Evaluation: pt worked w OT in tx session. pt completed sup to sit w SBA, sat EOB SBA, stood w walker and walked into hallway to column and back w min/CGA w walker. pt fatigues quickly but completed UE ex in bed 10x1. pt cont to progress in therapy.     Time Calculation:    Time Calculation- OT     Row Name 10/06/22 1445             Time Calculation- OT    OT Start Time 1309  -      OT Stop Time 1335  -      OT Time Calculation (min) 26 min  -      Total Timed Code Minutes- OT 26 minute(s)  -      OT Received On 10/06/22  -      OT - Next Appointment 10/07/22  -              Timed Charges    77414 - OT Therapeutic Exercise Minutes 26  -KP              Total Minutes    Timed Charges Total Minutes 26  -KP       Total Minutes 26  -KP            User Key  (r) = Recorded By, (t) = Taken By, (c) = Cosigned By    Initials Name Provider Type    Gaby Emmanuel OTR Occupational Therapist              Therapy Charges for Today     Code Description Service Date Service Provider Modifiers Qty    [id number] HC OT THER PROC EA 15 MIN 10/6/2022 Gaby Odom OTR GO 2               IRAM Ross  10/6/2022

## 2022-10-06 NOTE — PLAN OF CARE
Goal Outcome Evaluation:  Plan of Care Reviewed With: patient        Progress: no change  Outcome Evaluation: Pt in bed w/ sitter in room at beg of PT session. Pt sat up to EOB req SV and extra time. Pt stood req CGA and use of fww after taking a few min seated rest at EOB. Pt amb 55' req first CGA then min A as she became mildly unsteady reentering room. Pt did not need assist today for steering fww. Pt refused ther ex although stated she would perform later. Pt educ on imp of performing ther ex to incr strengthening w/ pt verbalizing understanding. Pt returned to bed w/ SBA. PT will prog as pt salima.    Patient was intermittently wearing a face mask during this therapy encounter. Therapist used appropriate personal protective equipment including mask and gloves.  Mask used was standard procedure mask. Appropriate PPE was worn during the entire therapy session. Hand hygiene was completed before and after therapy session. Patient is not in enhanced droplet precautions.

## 2022-10-06 NOTE — PLAN OF CARE
Goal Outcome Evaluation:  Plan of Care Reviewed With: patient, spouse        Progress: no change  Outcome Evaluation: Spouse at bedside.  Sitter at bedside for safety reason.  Dayana x 3 this shift for pain.  Xanax given at bedtime.  Atarax x 1 given for restlessness.  Sitter states patient restless in bed.  Patient unaware of restlessness.  Radiation M-F.

## 2022-10-06 NOTE — PLAN OF CARE
Goal Outcome Evaluation:  Plan of Care Reviewed With: patient        Progress: improving  Outcome Evaluation: pt worked w OT in tx session. pt completed sup to sit w SBA, sat EOB SBA, stood w walker and walked into hallway to column and back w min/CGA w walker. pt fatigues quickly but completed UE ex in bed 10x1. pt cont to progress in therapy.  OT wore all PPE, washed hands before/after

## 2022-10-06 NOTE — THERAPY TREATMENT NOTE
Patient Name: Holli Patel  : 1965    MRN: [id number]                              Today's Date: 10/6/2022       Admit Date: 2022    Visit Dx:     ICD-10-CM ICD-9-CM   1. 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)  C34.90 162.9    C79.51 198.5     Patient Active Problem List   Diagnosis   • History of gastroesophageal reflux (GERD)   • Primary insomnia   • Mixed hyperlipidemia   • Other specified hypothyroidism   • Arthritis   • Anxiety   • History of migraine   • Essential hypertension   • History of colon polyps   • Diverticulosis   • Leukocytosis   • Personal history of tobacco use   • Osteopenia   • Dyspnea   • Malignant neoplasm of upper-outer quadrant of right breast in female, estrogen receptor positive (HCC)   • Malignant neoplasm of upper-inner quadrant of left breast in female, estrogen receptor positive (HCC)   • Primary adenocarcinoma of upper lobe of left lung (HCC)   • Encounter for fitting and adjustment of vascular catheter   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)   • 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)   • History of bilateral breast cancer   • History of DVT (deep vein thrombosis)   • Stage 3a chronic kidney disease (HCC)   • Confusion   • Chemotherapy-induced neutropenia (HCC)   • Metabolic acidosis   • Ataxia   • Bony metastasis (HCC)   • Hypopotassemia   • Hypophosphatemia   • Hypomagnesemia   • Cancer related pain   • Acute cystitis without hematuria     Past Medical History:   Diagnosis Date   • Anxiety    • Clot     POSSIBLE BLOOD CLOT IN VENOUS ACCESS DEVICE-PT STATES WAS STARTED ON ELIQUIS    • Dyspnea on exertion    • Elevated cholesterol    • Frequent urination     DAY AND NIGHT   • SHAYY (generalized anxiety disorder)     RELATED HIGH BLOOD PRESSURE   • GERD (gastroesophageal reflux disease)    • High blood pressure     ANXIETY RELATED   • Hyperlipidemia    • Hypothyroidism    • Lung cancer (HCC)    • Lung nodule     LEFT    • Malignant neoplasm of upper-outer quadrant of right breast in female, estrogen receptor positive (HCC) 4/13/2021    PT STATES HAS BILAT BREAST CANCER   • Migraine    • PONV (postoperative nausea and vomiting)      Past Surgical History:   Procedure Laterality Date   • BREAST BIOPSY Bilateral 04/07/2021    Right Breast 10 o'clock & Left breast 10 o'clock 6 cm from nipple, [facility]   • CLOSED REDUCTION HIP DISLOCATION Left 4/30/2021    Procedure: BRONCHOSCOPY, LEFT VIDEO ASSITED THORACOSCOPY, ROBOTIC ASSSITED MEDIASTINAL LYMPHADENECTOMY WITH INTERCOSTAL NERVE BLOCKS;  Surgeon: Raphael Kerr III, MD;  Location: [facility];  Service: DaVinci;  Laterality: Left;   • COLONOSCOPY     • TUBAL ABDOMINAL LIGATION     • VENOUS ACCESS DEVICE (PORT) INSERTION Right 5/20/2021    Procedure: INSERTION VENOUS ACCESS DEVICE;  Surgeon: Raphael Kerr III, MD;  Location: [facility];  Service: Thoracic;  Laterality: Right;   • VENOUS ACCESS DEVICE (PORT) INSERTION Right 11/29/2021    Procedure: REVISION AND REPLACEMENT RIGHT SUBCLAVIAN VENOUS ACCESS PORT;  Surgeon: Raphael Kerr III, MD;  Location: [facility];  Service: Thoracic;  Laterality: Right;   • WRIST SURGERY Right       General Information     Row Name 10/06/22 1445          Physical Therapy Time and Intention    Document Type therapy note (daily note)  -PH     Mode of Treatment physical therapy  -     Row Name 10/06/22 1445          General Information    Existing Precautions/Restrictions fall  -     Row Name 10/06/22 1445          Safety Issues, Functional Mobility    Impairments Affecting Function (Mobility) endurance/activity tolerance;strength;balance  -     Comment, Safety Issues/Impairments (Mobility) gt belt and non skid socks donned  -           User Key  (r) = Recorded By, (t) = Taken By, (c) = Cosigned By    Initials Name Provider Type    PH Yamileth Thomas PTA Physical Therapist Assistant               Mobility     Row Name  10/06/22 1446          Bed Mobility    Bed Mobility supine-sit  -PH     Supine-Sit [place] (Bed Mobility) supervision;verbal cues  -PH     Sit-Supine [place] (Bed Mobility) standby assist;verbal cues  -PH     Assistive Device (Bed Mobility) bed rails;head of bed elevated  -PH     Comment, (Bed Mobility) seated rest taken at EOB by pt request  -PH     Row Name 10/06/22 1446          Sit-Stand Transfer    Sit-Stand [place] (Transfers) contact guard;verbal cues  -PH     Assistive Device (Sit-Stand Transfers) walker, front-wheeled  -PH     Comment, (Sit-Stand Transfer) fairly steady  -PH     Row Name 10/06/22 1446          Gait/Stairs (Locomotion)    [place] Level (Gait) contact guard;minimum assist (75% patient effort);verbal cues  -PH     Assistive Device (Gait) walker, front-wheeled  -PH     Distance in Feet (Gait) 55'  -PH     Deviations/Abnormal Patterns (Gait) yves decreased;gait speed decreased;stride length decreased;festinating/shuffling  -PH     Bilateral Gait Deviations heel strike decreased;forward flexed posture  -PH     [place] Level (Stairs) unable to assess  -PH     Comment, (Gait/Stairs) incr unsteadiness as pt returned to room req min A although no LOB  -PH           User Key  (r) = Recorded By, (t) = Taken By, (c) = Cosigned By    Initials Name Provider Type    Yamileth Lawrence PTA Physical Therapist Assistant               Obj/Interventions     Row Name 10/06/22 1448          Motor Skills    Therapeutic Exercise other (see comments)  pt refused w/ educ given on imp of performing to incr strength  -PH     Row Name 10/06/22 1448          Balance    Balance Assessment sitting static balance;standing static balance  -PH     Static Sitting Balance standby assist  -PH     Static Standing Balance contact guard  -PH     Position/Device Used, Standing Balance walker, front-wheeled  -PH           User Key  (r) = Recorded By, (t) = Taken By, (c) = Cosigned By    Initials  Name Provider Type     Yamileth Thomas PTA Physical Therapist Assistant               Goals/Plan    No documentation.                Clinical Impression     Row Name 10/06/22 1449          Pain    Pretreatment Pain Rating 0/10 - no pain  -PH     Posttreatment Pain Rating 0/10 - no pain  -PH     Pre/Posttreatment Pain Comment incr pelvic pain w/ activity; pt reports R calf pain is better today  -PH     Pain Intervention(s) Repositioned;Rest  -PH     Row Name 10/06/22 1449          Plan of Care Review    Plan of Care Reviewed With patient  -     Progress no change  -PH     Outcome Evaluation Pt in bed w/ sitter in room at beg of PT session. Pt sat up to EOB req SV and extra time. Pt stood req CGA and use of fww after taking a few min seated rest at EOB. Pt amb 55' req first CGA then min A as she became mildly unsteady reentering room. Pt did not need assist today for steering fww. Pt refused ther ex although stated she would perform later. Pt educ on imp of performing ther ex to incr strengthening w/ pt verbalizing understanding. Pt returned to bed w/ SBA. PT will prog as pt salima.  -PH     Row Name 10/06/22 1449          Positioning and Restraints    Pre-Treatment Position in bed  -PH     Post Treatment Position bed  -PH     In Bed fowlers;call light within reach;encouraged to call for assist;exit alarm on;with other staff  sitter in room  -           User Key  (r) = Recorded By, (t) = Taken By, (c) = Cosigned By    Initials Name Provider Type     Yamileth Thomas PTA Physical Therapist Assistant               Outcome Measures     Row Name 10/06/22 9172          How much help from another person do you currently need...    Turning from your back to your side while in flat bed without using bedrails? 3  -PH     Moving from lying on back to sitting on the side of a flat bed without bedrails? 3  -PH     Moving to and from a bed to a chair (including a wheelchair)? 3  -PH     Standing up from a chair  using your arms (e.g., wheelchair, bedside chair)? 3  -PH     Climbing 3-5 steps with a railing? 2  -PH     To walk in hospital room? 3  -PH     AM-PAC 6 Clicks Score (PT) 17  -PH     Highest level of mobility 5 --> Static standing  -PH     Row Name 10/06/22 1452 10/06/22 1444       Functional Assessment    Outcome Measure Options AM-PAC 6 Clicks Basic Mobility (PT)  -PH AM-PAC 6 Clicks Daily Activity (OT)  -          User Key  (r) = Recorded By, (t) = Taken By, (c) = Cosigned By    Initials Name Provider Type    Gaby Emmanuel, OTR Occupational Therapist    Yamileth aLwrence PTA Physical Therapist Assistant                             Physical Therapy Education                 Title: PT OT SLP Therapies (Done)     Topic: Physical Therapy (Done)     Point: Mobility training (Done)     Learning Progress Summary           Patient Acceptance, E, VU,NR by  at 10/6/2022 1452   Family Acceptance, E, VU by  at 9/29/2022 1157      Show all documentation for this point (12)                 Point: Home exercise program (Done)     Learning Progress Summary           Patient Acceptance, E, VU by  at 10/5/2022 1931   Family Acceptance, E, VU by HF at 9/29/2022 1157      Show all documentation for this point (8)                 Point: Body mechanics (Done)     Learning Progress Summary           Patient Acceptance, E, VU,NR by  at 10/6/2022 1452   Family Acceptance, E, VU by HF at 9/29/2022 1157      Show all documentation for this point (12)                 Point: Precautions (Done)     Learning Progress Summary           Patient Acceptance, E, VU,NR by  at 10/6/2022 1452   Family Acceptance, E, VU by  at 9/29/2022 1157      Show all documentation for this point (12)                             User Key     Initials Effective Dates Name Provider Type [facility] 06/16/21 -  Yamileth Thomas PTA Physical Therapist Assistant PT     09/22/22 -  Karma Parham, RN Registered Nurse  Nurse              PT Recommendation and Plan     Plan of Care Reviewed With: patient  Progress: no change  Outcome Evaluation: Pt in bed w/ sitter in room at beg of PT session. Pt sat up to EOB req SV and extra time. Pt stood req CGA and use of fww after taking a few min seated rest at EOB. Pt amb 55' req first CGA then min A as she became mildly unsteady reentering room. Pt did not need assist today for steering fww. Pt refused ther ex although stated she would perform later. Pt educ on imp of performing ther ex to incr strengthening w/ pt verbalizing understanding. Pt returned to bed w/ SBA. PT will prog as pt salima.     Time Calculation:    PT Charges     Row Name 10/06/22 1453             Time Calculation    Start Time 1434  -PH      Stop Time 1444  -PH      Time Calculation (min) 10 min  -PH      PT Received On 10/06/22  -PH      PT - Next Appointment 10/07/22  -PH              Timed Charges    22110 - PT Therapeutic Activity Minutes 10  -PH              Total Minutes    Timed Charges Total Minutes 10  -PH       Total Minutes 10  -PH            User Key  (r) = Recorded By, (t) = Taken By, (c) = Cosigned By    Initials Name Provider Type    PH Yamileth Thomas, PTA Physical Therapist Assistant              Therapy Charges for Today     Code Description Service Date Service Provider Modifiers Qty    70715829618 HC PT THERAPEUTIC ACT EA 15 MIN 10/5/2022 Yamileth Thomas PTA GP 1    69626140609 HC PT THERAPEUTIC ACT EA 15 MIN 10/6/2022 Yamileth Thomas PTA GP 1          PT G-Codes  Outcome Measure Options: AM-PAC 6 Clicks Basic Mobility (PT)  AM-PAC 6 Clicks Score (PT): 17  AM-PAC 6 Clicks Score (OT): 15    Yamileth Thomas PTA  10/6/2022

## 2022-10-07 ENCOUNTER — TREATMENT (OUTPATIENT)
Dept: RADIATION ONCOLOGY | Facility: HOSPITAL | Age: 57
End: 2022-10-07

## 2022-10-07 ENCOUNTER — TELEPHONE (OUTPATIENT)
Dept: OTHER | Facility: HOSPITAL | Age: 57
End: 2022-10-07

## 2022-10-07 LAB
ALBUMIN SERPL-MCNC: 3.6 G/DL (ref 3.5–5.2)
ALBUMIN/GLOB SERPL: 2.3 G/DL
ALP SERPL-CCNC: 99 U/L (ref 39–117)
ALT SERPL W P-5'-P-CCNC: 99 U/L (ref 1–33)
ANION GAP SERPL CALCULATED.3IONS-SCNC: 10.6 MMOL/L (ref 5–15)
AST SERPL-CCNC: 28 U/L (ref 1–32)
BASOPHILS # BLD AUTO: 0.02 10*3/MM3 (ref 0–0.2)
BASOPHILS NFR BLD AUTO: 0.2 % (ref 0–1.5)
BILIRUB SERPL-MCNC: 0.4 MG/DL (ref 0–1.2)
BUN SERPL-MCNC: 16 MG/DL (ref 6–20)
BUN/CREAT SERPL: 17.8 (ref 7–25)
CALCIUM SPEC-SCNC: 8.1 MG/DL (ref 8.6–10.5)
CHLORIDE SERPL-SCNC: 104 MMOL/L (ref 98–107)
CO2 SERPL-SCNC: 25.4 MMOL/L (ref 22–29)
CREAT SERPL-MCNC: 0.9 MG/DL (ref 0.57–1)
DEPRECATED RDW RBC AUTO: 55.8 FL (ref 37–54)
EGFRCR SERPLBLD CKD-EPI 2021: 73.8 ML/MIN/1.73
EOSINOPHIL # BLD AUTO: 0.14 10*3/MM3 (ref 0–0.4)
EOSINOPHIL NFR BLD AUTO: 1.3 % (ref 0.3–6.2)
ERYTHROCYTE [DISTWIDTH] IN BLOOD BY AUTOMATED COUNT: 17.8 % (ref 12.3–15.4)
GLOBULIN UR ELPH-MCNC: 1.6 GM/DL
GLUCOSE SERPL-MCNC: 104 MG/DL (ref 65–99)
HCT VFR BLD AUTO: 29.8 % (ref 34–46.6)
HGB BLD-MCNC: 9.6 G/DL (ref 12–15.9)
IMM GRANULOCYTES # BLD AUTO: 0.21 10*3/MM3 (ref 0–0.05)
IMM GRANULOCYTES NFR BLD AUTO: 1.9 % (ref 0–0.5)
LYMPHOCYTES # BLD AUTO: 0.45 10*3/MM3 (ref 0.7–3.1)
LYMPHOCYTES NFR BLD AUTO: 4.1 % (ref 19.6–45.3)
MCH RBC QN AUTO: 28.7 PG (ref 26.6–33)
MCHC RBC AUTO-ENTMCNC: 32.2 G/DL (ref 31.5–35.7)
MCV RBC AUTO: 89 FL (ref 79–97)
MONOCYTES # BLD AUTO: 0.82 10*3/MM3 (ref 0.1–0.9)
MONOCYTES NFR BLD AUTO: 7.4 % (ref 5–12)
NEUTROPHILS NFR BLD AUTO: 85.1 % (ref 42.7–76)
NEUTROPHILS NFR BLD AUTO: 9.44 10*3/MM3 (ref 1.7–7)
NRBC BLD AUTO-RTO: 0 /100 WBC (ref 0–0.2)
PLATELET # BLD AUTO: 164 10*3/MM3 (ref 140–450)
PMV BLD AUTO: 10.9 FL (ref 6–12)
POTASSIUM SERPL-SCNC: 3.3 MMOL/L (ref 3.5–5.2)
PROT SERPL-MCNC: 5.2 G/DL (ref 6–8.5)
RAD ONC ARIA COURSE ID: NORMAL
RAD ONC ARIA COURSE INTENT: NORMAL
RAD ONC ARIA COURSE LAST TREATMENT DATE: NORMAL
RAD ONC ARIA COURSE START DATE: NORMAL
RAD ONC ARIA COURSE TREATMENT ELAPSED DAYS: 9
RAD ONC ARIA FIRST TREATMENT DATE: NORMAL
RAD ONC ARIA PLAN FRACTIONS TREATED TO DATE: 8
RAD ONC ARIA PLAN ID: NORMAL
RAD ONC ARIA PLAN PRESCRIBED DOSE PER FRACTION: 3 GY
RAD ONC ARIA PLAN PRIMARY REFERENCE POINT: NORMAL
RAD ONC ARIA PLAN TOTAL FRACTIONS PRESCRIBED: 10
RAD ONC ARIA PLAN TOTAL PRESCRIBED DOSE: 3000 CGY
RAD ONC ARIA REFERENCE POINT DOSAGE GIVEN TO DATE: 24 GY
RAD ONC ARIA REFERENCE POINT DOSAGE GIVEN TO DATE: 24.23 GY
RAD ONC ARIA REFERENCE POINT ID: NORMAL
RAD ONC ARIA REFERENCE POINT ID: NORMAL
RAD ONC ARIA REFERENCE POINT SESSION DOSAGE GIVEN: 3 GY
RAD ONC ARIA REFERENCE POINT SESSION DOSAGE GIVEN: 3.03 GY
RBC # BLD AUTO: 3.35 10*6/MM3 (ref 3.77–5.28)
SODIUM SERPL-SCNC: 140 MMOL/L (ref 136–145)
WBC NRBC COR # BLD: 11.08 10*3/MM3 (ref 3.4–10.8)

## 2022-10-07 PROCEDURE — 85025 COMPLETE CBC W/AUTO DIFF WBC: CPT | Performed by: INTERNAL MEDICINE

## 2022-10-07 PROCEDURE — 99232 SBSQ HOSP IP/OBS MODERATE 35: CPT | Performed by: INTERNAL MEDICINE

## 2022-10-07 PROCEDURE — 77336 RADIATION PHYSICS CONSULT: CPT | Performed by: RADIOLOGY

## 2022-10-07 PROCEDURE — 63710000001 DEXAMETHASONE PER 0.25 MG: Performed by: INTERNAL MEDICINE

## 2022-10-07 PROCEDURE — 97530 THERAPEUTIC ACTIVITIES: CPT

## 2022-10-07 PROCEDURE — 80053 COMPREHEN METABOLIC PANEL: CPT | Performed by: INTERNAL MEDICINE

## 2022-10-07 PROCEDURE — 77386 CHG INTENSITY MODULATED RADIATION TX DLVR COMPLEX: CPT | Performed by: RADIOLOGY

## 2022-10-07 PROCEDURE — 25010000002 ENOXAPARIN PER 10 MG: Performed by: INTERNAL MEDICINE

## 2022-10-07 PROCEDURE — 77014 CHG CT GUIDANCE RADIATION THERAPY FLDS PLACEMENT: CPT | Performed by: RADIOLOGY

## 2022-10-07 PROCEDURE — 25010000002 CEFTRIAXONE PER 250 MG: Performed by: INTERNAL MEDICINE

## 2022-10-07 PROCEDURE — 77386: CPT | Performed by: RADIOLOGY

## 2022-10-07 RX ADMIN — DEXAMETHASONE 4 MG: 4 TABLET ORAL at 09:07

## 2022-10-07 RX ADMIN — OXYCODONE HYDROCHLORIDE 15 MG: 15 TABLET ORAL at 01:56

## 2022-10-07 RX ADMIN — OXYCODONE HYDROCHLORIDE 40 MG: 15 TABLET, FILM COATED, EXTENDED RELEASE ORAL at 20:09

## 2022-10-07 RX ADMIN — POLYETHYLENE GLYCOL 3350 17 G: 17 POWDER, FOR SOLUTION ORAL at 09:06

## 2022-10-07 RX ADMIN — OXYCODONE HYDROCHLORIDE 15 MG: 15 TABLET ORAL at 18:09

## 2022-10-07 RX ADMIN — OXYCODONE HYDROCHLORIDE 15 MG: 15 TABLET ORAL at 09:07

## 2022-10-07 RX ADMIN — Medication 2 PACKET: at 17:08

## 2022-10-07 RX ADMIN — HYDROXYZINE HYDROCHLORIDE 25 MG: 25 TABLET ORAL at 05:00

## 2022-10-07 RX ADMIN — Medication 10 ML: at 20:11

## 2022-10-07 RX ADMIN — Medication 2 PACKET: at 09:11

## 2022-10-07 RX ADMIN — OXYCODONE HYDROCHLORIDE 40 MG: 15 TABLET, FILM COATED, EXTENDED RELEASE ORAL at 09:15

## 2022-10-07 RX ADMIN — DULOXETINE HYDROCHLORIDE 30 MG: 30 CAPSULE, DELAYED RELEASE ORAL at 20:09

## 2022-10-07 RX ADMIN — Medication 10 ML: at 09:00

## 2022-10-07 RX ADMIN — ENOXAPARIN SODIUM 40 MG: 100 INJECTION SUBCUTANEOUS at 14:05

## 2022-10-07 RX ADMIN — ALPRAZOLAM 0.5 MG: 0.5 TABLET ORAL at 20:09

## 2022-10-07 RX ADMIN — OXYCODONE HYDROCHLORIDE 15 MG: 15 TABLET ORAL at 14:05

## 2022-10-07 RX ADMIN — CALCITRIOL 1 MCG: 0.25 CAPSULE ORAL at 09:07

## 2022-10-07 RX ADMIN — PANTOPRAZOLE SODIUM 40 MG: 40 TABLET, DELAYED RELEASE ORAL at 06:39

## 2022-10-07 RX ADMIN — ALPRAZOLAM 0.5 MG: 0.5 TABLET ORAL at 01:56

## 2022-10-07 RX ADMIN — DOCUSATE SODIUM 50MG AND SENNOSIDES 8.6MG 2 TABLET: 8.6; 5 TABLET, FILM COATED ORAL at 09:07

## 2022-10-07 RX ADMIN — LETROZOLE 2.5 MG: 2.5 TABLET, FILM COATED ORAL at 09:06

## 2022-10-07 RX ADMIN — LEVOTHYROXINE SODIUM 25 MCG: 0.03 TABLET ORAL at 06:39

## 2022-10-07 RX ADMIN — DOCUSATE SODIUM 50MG AND SENNOSIDES 8.6MG 2 TABLET: 8.6; 5 TABLET, FILM COATED ORAL at 20:09

## 2022-10-07 RX ADMIN — Medication 1000 MCG: at 09:07

## 2022-10-07 RX ADMIN — CEFTRIAXONE SODIUM 1 G: 1 INJECTION, POWDER, FOR SOLUTION INTRAMUSCULAR; INTRAVENOUS at 09:11

## 2022-10-07 RX ADMIN — BUPROPION HYDROCHLORIDE 150 MG: 150 TABLET, EXTENDED RELEASE ORAL at 09:07

## 2022-10-07 RX ADMIN — AMLODIPINE BESYLATE 2.5 MG: 2.5 TABLET ORAL at 09:07

## 2022-10-07 NOTE — PROGRESS NOTES
Name: Holli Patel ADMIT: 2022   : 1965  PCP: Vandana Gastelum MD    MRN: [id number] LOS: 14 days   AGE/SEX: 57 y.o. female  ROOM: [facility]   Subjective   No chief complaint on file.     Tolerating rocephin well. No dysuria now. No NVD or CP. No SOA. Pain is controlled as long as she is getting the medications. She reports occasional spasm in LLE. Discussed with family at bedside.    Objective   Vital Signs  Temp:  [97 °F (36.1 °C)-98.2 °F (36.8 °C)] 97.1 °F (36.2 °C)  Heart Rate:  [] 99  Resp:  [16-18] 16  BP: (104-115)/(71-85) 104/71  SpO2:  [97 %-98 %] 98 %  on   ;   Device (Oxygen Therapy): room air  Body mass index is 26.59 kg/m².    Physical Exam  Vitals and nursing note reviewed.   Constitutional:       General: She is not in acute distress.     Appearance: She is ill-appearing. She is not diaphoretic.   HENT:      Head:      Comments: contusion right eye/scalp  Eyes:      General:         Right eye: No discharge.         Left eye: No discharge.      Conjunctiva/sclera: Conjunctivae normal.   Cardiovascular:      Rate and Rhythm: Normal rate and regular rhythm.      Pulses: Normal pulses.   Pulmonary:      Effort: Pulmonary effort is normal.      Breath sounds: No wheezing.   Abdominal:      General: There is no distension.      Palpations: Abdomen is soft.      Tenderness: There is no abdominal tenderness. There is no guarding or rebound.   Musculoskeletal:         General: Tenderness present. No swelling.      Cervical back: Neck supple. No tenderness.   Skin:     General: Skin is warm and dry.   Neurological:      Mental Status: She is alert.      Cranial Nerves: No cranial nerve deficit.   Psychiatric:         Mood and Affect: Mood normal.         Behavior: Behavior normal.         Cognition and Memory: Memory is impaired.     I have reviewed the physical exam today and updated where needed as above.    Results Review:       I reviewed the patient's new clinical  results.      Results from last 7 days   Lab Units 10/07/22  0517 10/06/22  0557 10/05/22  0546 10/04/22  0600   WBC 10*3/mm3 11.08* 12.00* 12.08* 14.84*   HEMOGLOBIN g/dL 9.6* 9.9* 9.7* 10.1*   PLATELETS 10*3/mm3 164 184 163 175     Results from last 7 days   Lab Units 10/07/22  0517 10/06/22  0557 10/05/22  0546 10/04/22  0600   SODIUM mmol/L 140 140 139 140   POTASSIUM mmol/L 3.3* 3.8 3.8 4.1   CHLORIDE mmol/L 104 103 103 102   CO2 mmol/L 25.4 26.0 28.3 27.1   BUN mg/dL 16 19 19 21*   CREATININE mg/dL 0.90 0.84 0.79 0.83   GLUCOSE mg/dL 104* 79 71 89   Estimated Creatinine Clearance: 62.4 mL/min (by C-G formula based on SCr of 0.9 mg/dL).  Results from last 7 days   Lab Units 10/07/22  0517 10/06/22  0557 10/05/22  0546 10/04/22  0600 10/03/22  0503 10/02/22  0622   CALCIUM mg/dL 8.1* 8.4* 8.3* 8.6 8.7 9.0   ALBUMIN g/dL 3.60 3.60 3.40* 3.70 3.50 3.60   MAGNESIUM mg/dL  --  2.1 2.0  --  1.8  --    PHOSPHORUS mg/dL  --   --   --   --  3.4 4.1          amLODIPine, 2.5 mg, Oral, Q24H  buPROPion XL, 150 mg, Oral, Daily  calcitriol, 1 mcg, Oral, Daily  cefTRIAXone, 1 g, Intravenous, Q24H  dexamethasone, 4 mg, Oral, Daily With Breakfast  DULoxetine, 30 mg, Oral, Nightly  enoxaparin, 40 mg, Subcutaneous, Q24H  folic acid, 1,000 mcg, Oral, Daily  letrozole, 2.5 mg, Oral, Daily  levothyroxine, 25 mcg, Oral, Q AM  oxyCODONE, 40 mg, Oral, Q12H  pantoprazole, 40 mg, Oral, QAM  polyethylene glycol, 17 g, Oral, Daily   And  senna-docusate sodium, 2 tablet, Oral, BID  potassium & sodium phosphates, 2 packet, Oral, BID AC  sodium chloride, 10 mL, Intravenous, Q12H       Diet Regular; Cardiac    Assessment & Plan      Active Hospital Problems    Diagnosis  POA   • **Cancer related pain [G89.3]  Yes   • Acute cystitis without hematuria [N30.00]  No   • Stage 3a chronic kidney disease (HCC) [N18.31]  Yes   • 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) [C34.90, C79.51]  Yes   • Essential hypertension [I10]  Yes   • History  of gastroesophageal reflux (GERD) [Z87.19]  Not Applicable      Resolved Hospital Problems   No resolved problems to display.       · Metastatic NSCLC with Cancer Related Pain: Bone metastases. Undergoing palliative radiation therapy. Has been on Chemotherapy and Xgeva. Transitioned to long acting oxycodone with prn available from the fentanyl. Continues on cymbalta and dexamethasone. Improving pain. Oncology following.  · HTN: Acceptable acutely. Continue current regimen.  · Hypothyroidism: Synthroid. TSH ok on repeat.  · Thrush: Improved  · LFT: Liver US ok. Acute hepatitis panel negative.  · Metabolic Encephalopathy: 2/2 medications, hospitalization, uti, improving  · Falls:   · Acute Cystitis: Pansensitive Ecoli. Continue rocephin.   · PPx: Lovenox  · Disposition: snf v home/when ok with oncology    Blaine Mora MD  [facility]  10/07/22  09:52 EDT    Dictated portions using Dragon dictation software.    During the entire encounter, I was wearing recommended PPE including face mask and eye protection. Hand sanitization was performed prior to entering room and upon exit.

## 2022-10-07 NOTE — PLAN OF CARE
Goal Outcome Evaluation:  AOX4.  VSS.  Clear speech today and able to hold conversation throughout shift.  Patient expressed fear of falling again, but was able to ambulate in [place] with PT.  Up to BSC with assist x 1.  R chest PAC dressing changed today.  Patient tolerated well.

## 2022-10-07 NOTE — DISCHARGE PLACEMENT REQUEST
"Taniya Patel (57 y.o. Female)             Date of Birth   1965    Social Security Number       Address   [address] [place] [address]    Home Phone   [phone]    MRN   [id number]       [facility]    Marital Status                               Admission Date   9/23/22    Admission Type   Urgent    Admitting Provider   Kerrie Last MD    Attending Provider   Blaine Mora MD    Department, Room/Bed   [address], P399/1       Discharge Date       Discharge Disposition       Discharge Destination                               Attending Provider: Blaine Mora MD    Allergies: No Known Allergies    Isolation: None   Infection: None   Code Status: CPR   Advance Care Planning Activity    Ht: 160 cm (63\")   Wt: 68.1 kg (150 lb 2.1 oz)    Admission Cmt: None   Principal Problem: Cancer related pain [G89.3]                 Active Insurance as of 9/23/2022     Primary Coverage     Payor Plan Insurance Group Employer/Plan Group    HUMA[employer] HUMANA [id number]     Payor Plan Address Payor Plan Phone Number Payor Plan Fax Number Effective Dates    [address] [address] [phone]  1/1/2020 - None Entered    [facility] [id number]       Subscriber Name Subscriber Birth Date Member ID       TANIYA PATEL 1965 [id number]                 Emergency Contacts      (Rel.) Home Phone Work Phone Mobile Phone    MILKABK KING (Spouse) [phone] -- --              "

## 2022-10-07 NOTE — THERAPY TREATMENT NOTE
Patient Name: Holli Patel  : 1965    MRN: [id number]                              Today's Date: 10/7/2022       Admit Date: 2022    Visit Dx:     ICD-10-CM ICD-9-CM   1. 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)  C34.90 162.9    C79.51 198.5     Patient Active Problem List   Diagnosis   • History of gastroesophageal reflux (GERD)   • Primary insomnia   • Mixed hyperlipidemia   • Other specified hypothyroidism   • Arthritis   • Anxiety   • History of migraine   • Essential hypertension   • History of colon polyps   • Diverticulosis   • Leukocytosis   • Personal history of tobacco use   • Osteopenia   • Dyspnea   • Malignant neoplasm of upper-outer quadrant of right breast in female, estrogen receptor positive (HCC)   • Malignant neoplasm of upper-inner quadrant of left breast in female, estrogen receptor positive (HCC)   • Primary adenocarcinoma of upper lobe of left lung (HCC)   • Encounter for fitting and adjustment of vascular catheter   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)   • 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)   • History of bilateral breast cancer   • History of DVT (deep vein thrombosis)   • Stage 3a chronic kidney disease (HCC)   • Confusion   • Chemotherapy-induced neutropenia (HCC)   • Metabolic acidosis   • Ataxia   • Bony metastasis (HCC)   • Hypopotassemia   • Hypophosphatemia   • Hypomagnesemia   • Cancer related pain   • Acute cystitis without hematuria     Past Medical History:   Diagnosis Date   • Anxiety    • Clot     POSSIBLE BLOOD CLOT IN VENOUS ACCESS DEVICE-PT STATES WAS STARTED ON ELIQUIS    • Dyspnea on exertion    • Elevated cholesterol    • Frequent urination     DAY AND NIGHT   • SHAYY (generalized anxiety disorder)     RELATED HIGH BLOOD PRESSURE   • GERD (gastroesophageal reflux disease)    • High blood pressure     ANXIETY RELATED   • Hyperlipidemia    • Hypothyroidism    • Lung cancer (HCC)    • Lung nodule     LEFT    • Malignant neoplasm of upper-outer quadrant of right breast in female, estrogen receptor positive (HCC) 4/13/2021    PT STATES HAS BILAT BREAST CANCER   • Migraine    • PONV (postoperative nausea and vomiting)      Past Surgical History:   Procedure Laterality Date   • BREAST BIOPSY Bilateral 04/07/2021    Right Breast 10 o'clock & Left breast 10 o'clock 6 cm from nipple, [facility]   • CLOSED REDUCTION HIP DISLOCATION Left 4/30/2021    Procedure: BRONCHOSCOPY, LEFT VIDEO ASSITED THORACOSCOPY, ROBOTIC ASSSITED MEDIASTINAL LYMPHADENECTOMY WITH INTERCOSTAL NERVE BLOCKS;  Surgeon: Raphael Kerr III, MD;  Location: [facility];  Service: DaVinci;  Laterality: Left;   • COLONOSCOPY     • TUBAL ABDOMINAL LIGATION     • VENOUS ACCESS DEVICE (PORT) INSERTION Right 5/20/2021    Procedure: INSERTION VENOUS ACCESS DEVICE;  Surgeon: Raphael Kerr III, MD;  Location: [facility];  Service: Thoracic;  Laterality: Right;   • VENOUS ACCESS DEVICE (PORT) INSERTION Right 11/29/2021    Procedure: REVISION AND REPLACEMENT RIGHT SUBCLAVIAN VENOUS ACCESS PORT;  Surgeon: Raphael Kerr III, MD;  Location: [facility];  Service: Thoracic;  Laterality: Right;   • WRIST SURGERY Right       General Information     Row Name 10/07/22 1609          Physical Therapy Time and Intention    Document Type therapy note (daily note)  -     Mode of Treatment physical therapy;individual therapy  -     Row Name 10/07/22 1609          General Information    Existing Precautions/Restrictions fall  -     Row Name 10/07/22 1609          Cognition    Orientation Status (Cognition) oriented x 3  -     Row Name 10/07/22 1609          Safety Issues, Functional Mobility    Impairments Affecting Function (Mobility) endurance/activity tolerance;strength;balance  -           User Key  (r) = Recorded By, (t) = Taken By, (c) = Cosigned By    Initials Name Provider Type     Aniyah Garzon, PT Physical Therapist               Mobility      Row Name 10/07/22 1609          Bed Mobility    Bed Mobility supine-sit;sit-supine  -     Supine-Sit [place] (Bed Mobility) verbal cues;contact guard  -CH     Sit-Supine [place] (Bed Mobility) verbal cues;contact guard  -     Assistive Device (Bed Mobility) bed rails;head of bed elevated  -     Row Name 10/07/22 1609          Sit-Stand Transfer    Sit-Stand [place] (Transfers) verbal cues;contact guard  -CH     Assistive Device (Sit-Stand Transfers) walker, front-wheeled  -CH     Row Name 10/07/22 1609          Gait/Stairs (Locomotion)    [place] Level (Gait) verbal cues;nonverbal cues (demo/gesture);contact guard  -     Assistive Device (Gait) walker, front-wheeled  -     Distance in Feet (Gait) 60  -CH     Deviations/Abnormal Patterns (Gait) yves decreased;gait speed decreased;stride length decreased;festinating/shuffling  -     Bilateral Gait Deviations heel strike decreased;forward flexed posture  -           User Key  (r) = Recorded By, (t) = Taken By, (c) = Cosigned By    Initials Name Provider Type     Aniyah Garzon, PT Physical Therapist               Obj/Interventions    No documentation.                Goals/Plan    No documentation.                Clinical Impression     Row Name 10/07/22 1611          Pain    Pretreatment Pain Rating 0/10 - no pain  -     Posttreatment Pain Rating 0/10 - no pain  -     Row Name 10/07/22 1611          Plan of Care Review    Plan of Care Reviewed With patient;family  -     Outcome Evaluation Pt demonstrates some increased activity tolarance and functional strength as she was able to increase her gait distance slighlty and required less assistance with transfers and gait. PT will continue to follow to address strength, mobility, and gait.  -     Row Name 10/07/22 1611          Positioning and Restraints    Pre-Treatment Position in bed  -CH     Post Treatment Position bed  -     In Bed supine;call light within  reach;encouraged to call for assist;exit alarm on;with family/caregiver  -           User Key  (r) = Recorded By, (t) = Taken By, (c) = Cosigned By    Initials Name Provider Type    Aniyah Alas PT Physical Therapist               Outcome Measures     Row Name 10/07/22 1615          How much help from another person do you currently need...    Turning from your back to your side while in flat bed without using bedrails? 3  -CH     Moving from lying on back to sitting on the side of a flat bed without bedrails? 3  -CH     Moving to and from a bed to a chair (including a wheelchair)? 3  -CH     Standing up from a chair using your arms (e.g., wheelchair, bedside chair)? 3  -CH     Climbing 3-5 steps with a railing? 2  -CH     To walk in hospital room? 3  -CH     AM-PAC 6 Clicks Score (PT) 17  -CH     Highest level of mobility 5 --> Static standing  -     Row Name 10/07/22 1615          Functional Assessment    Outcome Measure Options AM-PAC 6 Clicks Basic Mobility (PT)  -           User Key  (r) = Recorded By, (t) = Taken By, (c) = Cosigned By    Initials Name Provider Type    Aniyah Alas PT Physical Therapist                             Physical Therapy Education                 Title: PT OT SLP Therapies (Done)     Topic: Physical Therapy (Done)     Point: Mobility training (Done)     Learning Progress Summary           Patient Acceptance, E,TB,D, VU,NR by  at 10/7/2022 1615   Family Acceptance, E, VU by  at 9/29/2022 1157      Show all documentation for this point (13)                 Point: Home exercise program (Done)     Learning Progress Summary           Patient Acceptance, E, VU by  at 10/5/2022 1931   Family Acceptance, E, VU by  at 9/29/2022 1157      Show all documentation for this point (8)                 Point: Body mechanics (Done)     Learning Progress Summary           Patient Acceptance, E,TB,D, VU,NR by  at 10/7/2022 1615   Family Acceptance, E, VU by  at  9/29/2022 1157      Show all documentation for this point (13)                 Point: Precautions (Done)     Learning Progress Summary           Patient Acceptance, E,TB,D, VU,NR by  at 10/7/2022 1615   Family Acceptance, E, VU by  at 9/29/2022 1157      Show all documentation for this point (13)                             User Key     Initials Effective Dates Name Provider Type Discipline     06/16/21 -  Aniyah Garzon PT Physical Therapist PT    HF 09/22/22 -  Karma Parham, RN Registered Nurse Nurse              PT Recommendation and Plan     Plan of Care Reviewed With: patient, family  Outcome Evaluation: Pt demonstrates some increased activity tolarance and functional strength as she was able to increase her gait distance slighlty and required less assistance with transfers and gait. PT will continue to follow to address strength, mobility, and gait.     Time Calculation:    PT Charges     Row Name 10/07/22 1616             Time Calculation    Start Time 1542  -CH      Stop Time 1552  -      Time Calculation (min) 10 min  -      PT Received On 10/07/22  -      PT - Next Appointment 10/08/22  -              Time Calculation- PT    Total Timed Code Minutes- PT 10 minute(s)  -CH              Timed Charges    39250 - PT Therapeutic Activity Minutes 10  -CH              Total Minutes    Timed Charges Total Minutes 10  -CH       Total Minutes 10  -CH            User Key  (r) = Recorded By, (t) = Taken By, (c) = Cosigned By    Initials Name Provider Type     Aniyah Garzon PT Physical Therapist              Therapy Charges for Today     Code Description Service Date Service Provider Modifiers Qty    [id number] HC PT THERAPEUTIC ACT EA 15 MIN 10/7/2022 Aniyah Garzon PT GP 1          PT G-Codes  Outcome Measure Options: AM-PAC 6 Clicks Basic Mobility (PT)  AM-PAC 6 Clicks Score (PT): 17  AM-PAC 6 Clicks Score (OT): 15    Aniyah Garzon PT  10/7/2022

## 2022-10-07 NOTE — TELEPHONE ENCOUNTER
Oncology Social Work    Phone call received from patient's , Jelani.  Answered many questions pertaining to Medicaid and how to apply through [employer]  Patient and 's annual family income is over and at this time are not eligible for medicaid.  Explained that open enrollment is Nov 1- Phong 15 and that her option would be to apply through the marketplace/ Affordable Care Act.  Provided website and phone number to [employer]    Aura Bedolla, JAISONW, CSW

## 2022-10-07 NOTE — PROGRESS NOTES
[facility] INPATIENT PROGRESS NOTE    Length of Stay:  14 days    CHIEF COMPLAINT/REASON FOR VISIT:  Metastatic non-small cell lung cancer  Cancer-related pain    SUBJECTIVE:  Afebrile. Intermittent tachycardia, improved. BP a little low. On room air. Denies pain.     ROS:  14 systems reviewed with pertinent positives and negatives in the HPI. Reviewed today.    OBJECTIVE:  Vitals:    10/06/22 1200 10/06/22 1907 10/06/22 2305 10/07/22 0709   BP: 105/72 115/85 104/74 104/71   BP Location: Left arm Left arm Right arm Right arm   Patient Position: Lying Lying Lying Lying   Pulse: 102 (!) 124 95 99   Resp: 18 16 16 16   Temp: 97.3 °F (36.3 °C) 98.2 °F (36.8 °C) 97 °F (36.1 °C) 97.1 °F (36.2 °C)   TempSrc: Oral Oral Oral Oral   SpO2: 97% 97% 97% 98%   Weight:       Height:             PHYSICAL EXAMINATION:    General: NAD, alert, answers questions appropriately  HEENT: R scalp and deandre-orbital hematoma with some movement to the left deandre-orbital area  Chest/Lungs: CTAB. Right chest mediport in place  Heart: RRR  Abdomen/GI: soft, NT, ND, NABS  Extremities: WWP, no edema or tenderness    DIAGNOSTIC DATA:  Results Review:     I reviewed the patient's new clinical results.    Results from last 7 days   Lab Units 10/07/22  0517   WBC 10*3/mm3 11.08*   HEMOGLOBIN g/dL 9.6*   HEMATOCRIT % 29.8*   PLATELETS 10*3/mm3 164     Lab Results   Component Value Date    NEUTROABS 9.44 (H) 10/07/2022     Results from last 7 days   Lab Units 10/07/22  0517   SODIUM mmol/L 140   POTASSIUM mmol/L 3.3*   CHLORIDE mmol/L 104   CO2 mmol/L 25.4   BUN mg/dL 16   CREATININE mg/dL 0.90   GLUCOSE mg/dL 104*   CALCIUM mg/dL 8.1*         Results from last 7 days   Lab Units 10/06/22  0557   MAGNESIUM mg/dL 2.1         IMAGING:    MRI Pelvis With & Without Contrast (09/24/2022 17:26)  CT Head Without Contrast (10/04/2022 07:20)    ASSESSMENT:  This is a 57 y.o. female with:     *Metastatic non-small cell lung cancer  · She was initially  diagnosed with stage III (T1N2M0) disease.    · She received initial therapy with cisplatin and Alimta concurrent with radiation therapy beginning 5/25/2021.    · There were indeterminate findings in the lumbar spine at L1 and it was recommended to monitor this over time.    · Patient completed 3 cycles cisplatin and Alimta 7/7/2021 and completed radiation therapy 7/12/2021.    · PET scan 8/6/2021 showed good response to treatment.    · MRI lumbar spine 10/1/2021 with increase in size of the L1 lesion.    · Biopsy of the L1 lesion on 10/14/2022 confirmed metastatic adenocarcinoma of lung origin, PD-L1 TPS 0.    · PET scan 10/26/2021 with involvement of left adrenal and L1/L2 only.    · Patient received radiation therapy to L1/L2 on 11/19/2021.  She also received radiation to the left adrenal gland.    · She initiated further systemic therapy on 11/22/2021 with Keytruda and Alimta.    · Follow-up PET scan 1/18/2022 with decrease in small left upper lobe pulmonary nodule, resolution of activity at L1/L2, no new sites of disease.    · Guardant 360 CT DNA level was monitored and was decreasing.    · PET scan 4/11/2022 with progression in left adrenal and L1.    · Maintained systemic therapy with Keytruda and Alimta and received additional radiation to left adrenal and L1.  Alimta however held since 5/10/2022 due to renal dysfunction.  Radiation completed to lumbar spine 7/14/2022.    · PET scan 7/18/2022 with multiple areas of progression of the spine and right sacrum.    · Guardant 360 analysis 7/20/2022 with no actionable mutations.    · Change in therapy to single agent palliative Taxotere initiated 7/26/2022.    · Altered mental status and febrile neutropenia requiring hospitalization 8/2 - 8/9/2022.  During that admission, MRI brain, cervical, thoracic, lumbar spine performed 8/4/2022 in addition to CT cervical spine 8/5/2022.  There was evidence of C2 metastatic lesion with some dural enhancement, compression  deformity at T7 with mild edema suggesting acute to subacute fracture, 5 mm T12 spinous process metastasis, multifocal disease in the lumbar spine and sacrum, largest involving sacral ala to the right 5 cm in transverse dimension.  Loss of height at L2 25%.  · Patient received cycle 3 Taxotere 9/6/2022 with Neulasta support.    · She has been continuing as well on Xgeva every 4 weeks (last received 9/6/2022).  · Patient did undergo MRI lumbar spine and pelvis on 9/24/2022.  MRI pelvis showed bilateral sacral fractures with marrow infiltration related to metastasis more prominent on the right.  Lesion on the right 3.5 x 4.1 x 4.3 cm.  Also probable metastasis along posterior acetabulum on the right 1.5 cm.  MRI lumbar spine with stable L1 metastasis, new edema endplate T12 possibly stress reaction versus developing new metastasis, lesion at T12 spinous process unchanged.   · Radiation oncology consulted with plans for palliative treatment of the right sacral metastasis.  Treatment initiated 9/28/2022  · Patient was going to have a PET scan which was delayed because she was admitted.     *History of bilateral stage I breast cancer  · Patient with bilateral breast cancer, both stage I ([id number]), grade 2, ER/PA positive and HER2/melinda negative with low Ki-67.    · Patient initiated adjuvant letrozole in May 2021.  · Patient continues to take letrozole daily.     *Cancer related pain  · Patient has been receiving Duragesic patch 50 mcg every 72 hours in addition to oxycodone 15 mg every 4 hours for breakthrough pain.  Duragesic dose had been escalated recently in the outpatient setting due to worsening pain  · On admission 9/23/2022, Duragesic patch increased to 75 mcg daily, added Decadron 4 mg IV every 6 hours with continuation of oxycodone 15 mg every 4 hours as needed for breakthrough pain in addition of Dilaudid 1 mg every 2 hours as needed for breakthrough pain.  · Patient with increasing side effects from  narcotics, Duragesic decreased on 9/25/2022 from 75 down to 50 mcg patch every 72 hours.  · Dexamethasone dose decreased to 4 mg p.o. every 12 hours on 9/27/2022  · Initiated palliative radiation to the right sacrum on 9/28/2022  · Patient became confused after receiving Dilaudid.  Patient and family asked to discontinue Dilaudid.  · Duragesic patch dose was increased to 75 mcg/h on 9/30/2022. Subsequent decrease to 50 mcg due to confusion  · Gabapentin was discontinued due to the sedating effect.  · Oxycodone is being used for breakthrough pain.  · 10/3 transition to sustained release oxycodone 40 mg bid and fentanyl patch stopped  · Pain well controlled.      *Anxiety/depression  · Patient was previously on Wellbutrin  mg daily, Lexapro 10 mg daily.  · On 9/30/2022, she was switched from Lexapro to Cymbalta 30 mg daily.  · Atarax initiated for restlessness, improved     *Difficulty with sleep.  · Patient was previously on Xanax but reported that it was not working.  · Patient previously tried melatonin and it was not effective.  · She was placed on Restoril 15 mg nightly on 10/1/2022.  · The patient had 3 falls since last night.  · Its not clear if this is due to Restoril but since this happened after the change, we switched back to Xanax-Home dose of 0.5 mg as needed for sleep.     *Anemia  · Most recent evaluation 8/3/2022 with iron 15, ferritin 276, iron saturation 5%, TIBC 328, folate greater than 20, B12 392  · Anemia felt to be related to malignancy/chronic disease and chemotherapy  · Hemoglobin has remained in the 8-10 range  · Hemoglobin is stable at 9.6 today.     *Peripheral neuropathy  · Patient was on gabapentin 300 mg twice daily.  · Gabapentin was discontinued on 9/30/2022.  · She is currently on Cymbalta.     *Narcotic induced constipation  · Patient is on Sybil-Colace 2 tablets twice daily with MiraLAX daily   · Patient is now having bowel movements.     *GI prophylaxis  · Protonix 40 mg  daily     *DVT prophylaxis  · Lovenox 40 mg daily     *Elevated liver labs  · Atorvastatin was discontinued  · ?  Due to Diflucan (given to treat thrush).    · Improving    *Delirium  · Neurology following  · ? Hospital delirium +/- medications  · Improved    *BLE calf pain  · BLE venous duplex negative. Exam reassurring    *Fall with right scalp hematoma    *Deconditioning  · PT following. ? Subacute rehabilitation    *UTI  · Symptomatic   · Ucx with E coli, pansensitive  · On Rocephin    RECOMMENDATIONS:  1. Continue radiation, fx 8/10 today  2. Continue sustained release oxycydone 40 mg q12h, added on 10/3 and fentanyl patch stopped. Pain improved.  3. Continue prn oxycodone. Used 3x yesterday  4. Cymbalta at 30 mg daily  5. Continue dexamethasone at 4 mg in the morning  6. Xanax qhs prn  7. On Rocephin for UTI  8. At this point I would anticipate subacute rehabilitation and then outpatient with Dr. Jacobs as an outpatient when possible with an outpatient PET scan done prior to that. If there is progression, no standard therapy options remain and then there would be consideration of referral for a clinical trial if she's able. Discussed with Dr. Snell who has discussed with her  Jelani as well.  If she can improve by early to mid next week, maybe home. Otherwise subacute rehabilitation.  is going to try to get her out of bed more.     Will follow. Discussed with Dr. Snell and her  today.       Eddie Cardenas MD

## 2022-10-07 NOTE — PLAN OF CARE
Problem: Adult Inpatient Plan of Care  Goal: Plan of Care Review  Outcome: Ongoing, Progressing  Flowsheets  Taken 10/7/2022 0509 by Mary Craft, RN  Progress: no change  Outcome Evaluation: VSS,  at bedside. Sitter at bedside. Pain meds given PRN, Xanax given PRN. Port dressing C/D/I. Port needle and dressing due to be changed 10/7.  Taken 10/6/2022 1449 by Yamileth Thomas PTA  Plan of Care Reviewed With: patient  Goal: Patient-Specific Goal (Individualized)  Outcome: Ongoing, Progressing  Goal: Absence of Hospital-Acquired Illness or Injury  Outcome: Ongoing, Progressing  Intervention: Identify and Manage Fall Risk  Recent Flowsheet Documentation  Taken 10/7/2022 0450 by Mary Craft, RN  Safety Promotion/Fall Prevention:   activity supervised   assistive device/personal items within reach   clutter free environment maintained   safety round/check completed  Taken 10/7/2022 0224 by Mary Craft RN  Safety Promotion/Fall Prevention:   activity supervised   assistive device/personal items within reach   clutter free environment maintained   safety round/check completed  Taken 10/7/2022 0042 by Mary Craft RN  Safety Promotion/Fall Prevention:   activity supervised   assistive device/personal items within reach   safety round/check completed  Taken 10/6/2022 2230 by Mary Craft, RN  Safety Promotion/Fall Prevention:   activity supervised   assistive device/personal items within reach   clutter free environment maintained   safety round/check completed  Taken 10/6/2022 2053 by Mary Craft, RN  Safety Promotion/Fall Prevention:   activity supervised   assistive device/personal items within reach   clutter free environment maintained   fall prevention program maintained   safety round/check completed  Intervention: Prevent Skin Injury  Recent Flowsheet Documentation  Taken 10/6/2022 2053 by Mary Craft, RN  Skin Protection: adhesive use limited  Intervention: Prevent and Manage VTE  (Venous Thromboembolism) Risk  Recent Flowsheet Documentation  Taken 10/6/2022 2053 by Mary Craft RN  Activity Management: activity adjusted per tolerance  VTE Prevention/Management:   bilateral   sequential compression devices off  Range of Motion: active ROM (range of motion) encouraged  Intervention: Prevent Infection  Recent Flowsheet Documentation  Taken 10/7/2022 0450 by Mary Craft RN  Infection Prevention:   single patient room provided   rest/sleep promoted  Taken 10/7/2022 0224 by Mary Craft RN  Infection Prevention:   hand hygiene promoted   rest/sleep promoted  Taken 10/7/2022 0042 by Mary Craft RN  Infection Prevention:   hand hygiene promoted   single patient room provided   rest/sleep promoted  Taken 10/6/2022 2230 by Mary Craft RN  Infection Prevention:   hand hygiene promoted   single patient room provided  Taken 10/6/2022 2053 by Mary Craft RN  Infection Prevention:   hand hygiene promoted   single patient room provided   rest/sleep promoted  Goal: Optimal Comfort and Wellbeing  Outcome: Ongoing, Progressing  Intervention: Monitor Pain and Promote Comfort  Recent Flowsheet Documentation  Taken 10/6/2022 2053 by Mary Craft RN  Pain Management Interventions: see MAR  Intervention: Provide Person-Centered Care  Recent Flowsheet Documentation  Taken 10/6/2022 2053 by Mary Craft RN  Trust Relationship/Rapport:   care explained   choices provided   thoughts/feelings acknowledged  Goal: Readiness for Transition of Care  Outcome: Ongoing, Progressing     Problem: Fall Injury Risk  Goal: Absence of Fall and Fall-Related Injury  Outcome: Ongoing, Progressing  Intervention: Identify and Manage Contributors  Recent Flowsheet Documentation  Taken 10/7/2022 0450 by Mary rCaft RN  Medication Review/Management: medications reviewed  Taken 10/7/2022 0224 by Mary Craft RN  Medication Review/Management: medications reviewed  Taken 10/7/2022 0042 by Mary Craft  RN  Medication Review/Management: medications reviewed  Taken 10/6/2022 2230 by Mary Craft RN  Medication Review/Management: medications reviewed  Taken 10/6/2022 2053 by Mary Craft RN  Medication Review/Management: medications reviewed  Intervention: Promote Injury-Free Environment  Recent Flowsheet Documentation  Taken 10/7/2022 0450 by Mary Craft RN  Safety Promotion/Fall Prevention:   activity supervised   assistive device/personal items within reach   clutter free environment maintained   safety round/check completed  Taken 10/7/2022 0224 by Mary Craft RN  Safety Promotion/Fall Prevention:   activity supervised   assistive device/personal items within reach   clutter free environment maintained   safety round/check completed  Taken 10/7/2022 0042 by Mary Craft RN  Safety Promotion/Fall Prevention:   activity supervised   assistive device/personal items within reach   safety round/check completed  Taken 10/6/2022 2230 by Mary Craft RN  Safety Promotion/Fall Prevention:   activity supervised   assistive device/personal items within reach   clutter free environment maintained   safety round/check completed  Taken 10/6/2022 2053 by Mary Craft RN  Safety Promotion/Fall Prevention:   activity supervised   assistive device/personal items within reach   clutter free environment maintained   fall prevention program maintained   safety round/check completed     Problem: Skin Injury Risk Increased  Goal: Skin Health and Integrity  Outcome: Ongoing, Progressing  Intervention: Optimize Skin Protection  Recent Flowsheet Documentation  Taken 10/6/2022 2053 by Mary Craft RN  Pressure Reduction Techniques: frequent weight shift encouraged  Pressure Reduction Devices: alternating pressure pump (ADD)  Skin Protection: adhesive use limited   Goal Outcome Evaluation:           Progress: no change  Outcome Evaluation: VSS,  at bedside. Sitter at bedside. Pain meds given PRN, Xanax given  PRN. Port dressing C/D/I. Port needle and dressing due to be changed 10/7.

## 2022-10-07 NOTE — PLAN OF CARE
Goal Outcome Evaluation:  Plan of Care Reviewed With: patient, family           Outcome Evaluation: Pt demonstrates some increased activity tolarance and functional strength as she was able to increase her gait distance slighlty and required less assistance with transfers and gait. PT will continue to follow to address strength, mobility, and gait.

## 2022-10-08 LAB
ALBUMIN SERPL-MCNC: 3.3 G/DL (ref 3.5–5.2)
ALBUMIN/GLOB SERPL: 1.7 G/DL
ALP SERPL-CCNC: 97 U/L (ref 39–117)
ALT SERPL W P-5'-P-CCNC: 106 U/L (ref 1–33)
ANION GAP SERPL CALCULATED.3IONS-SCNC: 10.6 MMOL/L (ref 5–15)
AST SERPL-CCNC: 33 U/L (ref 1–32)
BASOPHILS # BLD AUTO: 0.02 10*3/MM3 (ref 0–0.2)
BASOPHILS NFR BLD AUTO: 0.2 % (ref 0–1.5)
BILIRUB SERPL-MCNC: 0.4 MG/DL (ref 0–1.2)
BUN SERPL-MCNC: 15 MG/DL (ref 6–20)
BUN/CREAT SERPL: 17 (ref 7–25)
CALCIUM SPEC-SCNC: 8.1 MG/DL (ref 8.6–10.5)
CHLORIDE SERPL-SCNC: 105 MMOL/L (ref 98–107)
CO2 SERPL-SCNC: 24.4 MMOL/L (ref 22–29)
CREAT SERPL-MCNC: 0.88 MG/DL (ref 0.57–1)
DEPRECATED RDW RBC AUTO: 57.3 FL (ref 37–54)
EGFRCR SERPLBLD CKD-EPI 2021: 75.8 ML/MIN/1.73
EOSINOPHIL # BLD AUTO: 0.13 10*3/MM3 (ref 0–0.4)
EOSINOPHIL NFR BLD AUTO: 1.2 % (ref 0.3–6.2)
ERYTHROCYTE [DISTWIDTH] IN BLOOD BY AUTOMATED COUNT: 17.9 % (ref 12.3–15.4)
GLOBULIN UR ELPH-MCNC: 1.9 GM/DL
GLUCOSE SERPL-MCNC: 92 MG/DL (ref 65–99)
HCT VFR BLD AUTO: 29.1 % (ref 34–46.6)
HGB BLD-MCNC: 9.1 G/DL (ref 12–15.9)
IMM GRANULOCYTES # BLD AUTO: 0.2 10*3/MM3 (ref 0–0.05)
IMM GRANULOCYTES NFR BLD AUTO: 1.9 % (ref 0–0.5)
LYMPHOCYTES # BLD AUTO: 0.41 10*3/MM3 (ref 0.7–3.1)
LYMPHOCYTES NFR BLD AUTO: 3.9 % (ref 19.6–45.3)
MCH RBC QN AUTO: 28.1 PG (ref 26.6–33)
MCHC RBC AUTO-ENTMCNC: 31.3 G/DL (ref 31.5–35.7)
MCV RBC AUTO: 89.8 FL (ref 79–97)
MONOCYTES # BLD AUTO: 0.68 10*3/MM3 (ref 0.1–0.9)
MONOCYTES NFR BLD AUTO: 6.4 % (ref 5–12)
NEUTROPHILS NFR BLD AUTO: 86.4 % (ref 42.7–76)
NEUTROPHILS NFR BLD AUTO: 9.11 10*3/MM3 (ref 1.7–7)
NRBC BLD AUTO-RTO: 0 /100 WBC (ref 0–0.2)
PLATELET # BLD AUTO: 167 10*3/MM3 (ref 140–450)
PMV BLD AUTO: 10.2 FL (ref 6–12)
POTASSIUM SERPL-SCNC: 3.1 MMOL/L (ref 3.5–5.2)
PROT SERPL-MCNC: 5.2 G/DL (ref 6–8.5)
RBC # BLD AUTO: 3.24 10*6/MM3 (ref 3.77–5.28)
SODIUM SERPL-SCNC: 140 MMOL/L (ref 136–145)
WBC NRBC COR # BLD: 10.55 10*3/MM3 (ref 3.4–10.8)

## 2022-10-08 PROCEDURE — 25010000002 CEFTRIAXONE PER 250 MG: Performed by: INTERNAL MEDICINE

## 2022-10-08 PROCEDURE — 25010000002 ENOXAPARIN PER 10 MG: Performed by: INTERNAL MEDICINE

## 2022-10-08 PROCEDURE — 99232 SBSQ HOSP IP/OBS MODERATE 35: CPT | Performed by: INTERNAL MEDICINE

## 2022-10-08 PROCEDURE — 63710000001 DEXAMETHASONE PER 0.25 MG: Performed by: INTERNAL MEDICINE

## 2022-10-08 PROCEDURE — 85025 COMPLETE CBC W/AUTO DIFF WBC: CPT | Performed by: INTERNAL MEDICINE

## 2022-10-08 PROCEDURE — 80053 COMPREHEN METABOLIC PANEL: CPT | Performed by: INTERNAL MEDICINE

## 2022-10-08 RX ORDER — POTASSIUM CHLORIDE 750 MG/1
40 TABLET, FILM COATED, EXTENDED RELEASE ORAL ONCE
Status: COMPLETED | OUTPATIENT
Start: 2022-10-08 | End: 2022-10-08

## 2022-10-08 RX ADMIN — POTASSIUM CHLORIDE 40 MEQ: 750 TABLET, EXTENDED RELEASE ORAL at 20:01

## 2022-10-08 RX ADMIN — OXYCODONE HYDROCHLORIDE 15 MG: 15 TABLET ORAL at 04:39

## 2022-10-08 RX ADMIN — LETROZOLE 2.5 MG: 2.5 TABLET, FILM COATED ORAL at 08:32

## 2022-10-08 RX ADMIN — ENOXAPARIN SODIUM 40 MG: 100 INJECTION SUBCUTANEOUS at 17:13

## 2022-10-08 RX ADMIN — DULOXETINE HYDROCHLORIDE 30 MG: 30 CAPSULE, DELAYED RELEASE ORAL at 20:00

## 2022-10-08 RX ADMIN — AMLODIPINE BESYLATE 2.5 MG: 2.5 TABLET ORAL at 08:31

## 2022-10-08 RX ADMIN — DOCUSATE SODIUM 50MG AND SENNOSIDES 8.6MG 2 TABLET: 8.6; 5 TABLET, FILM COATED ORAL at 20:00

## 2022-10-08 RX ADMIN — DEXAMETHASONE 4 MG: 4 TABLET ORAL at 08:31

## 2022-10-08 RX ADMIN — CALCITRIOL 1 MCG: 0.25 CAPSULE ORAL at 08:31

## 2022-10-08 RX ADMIN — OXYCODONE HYDROCHLORIDE 15 MG: 15 TABLET ORAL at 23:37

## 2022-10-08 RX ADMIN — PANTOPRAZOLE SODIUM 40 MG: 40 TABLET, DELAYED RELEASE ORAL at 06:24

## 2022-10-08 RX ADMIN — CEFTRIAXONE SODIUM 1 G: 1 INJECTION, POWDER, FOR SOLUTION INTRAMUSCULAR; INTRAVENOUS at 08:32

## 2022-10-08 RX ADMIN — HYDROXYZINE HYDROCHLORIDE 25 MG: 25 TABLET ORAL at 04:03

## 2022-10-08 RX ADMIN — Medication 10 ML: at 08:33

## 2022-10-08 RX ADMIN — OXYCODONE HYDROCHLORIDE 40 MG: 15 TABLET, FILM COATED, EXTENDED RELEASE ORAL at 20:00

## 2022-10-08 RX ADMIN — Medication 10 ML: at 20:01

## 2022-10-08 RX ADMIN — ALPRAZOLAM 0.5 MG: 0.5 TABLET ORAL at 20:01

## 2022-10-08 RX ADMIN — ACETAMINOPHEN 650 MG: 325 TABLET, FILM COATED ORAL at 18:19

## 2022-10-08 RX ADMIN — Medication 1000 MCG: at 08:31

## 2022-10-08 RX ADMIN — Medication 2 PACKET: at 08:32

## 2022-10-08 RX ADMIN — OXYCODONE HYDROCHLORIDE 15 MG: 15 TABLET ORAL at 12:10

## 2022-10-08 RX ADMIN — OXYCODONE HYDROCHLORIDE 40 MG: 15 TABLET, FILM COATED, EXTENDED RELEASE ORAL at 08:31

## 2022-10-08 RX ADMIN — BUPROPION HYDROCHLORIDE 150 MG: 150 TABLET, EXTENDED RELEASE ORAL at 08:31

## 2022-10-08 RX ADMIN — POTASSIUM CHLORIDE 40 MEQ: 750 TABLET, EXTENDED RELEASE ORAL at 08:32

## 2022-10-08 RX ADMIN — OXYCODONE HYDROCHLORIDE 15 MG: 15 TABLET ORAL at 16:25

## 2022-10-08 RX ADMIN — Medication 2 PACKET: at 17:13

## 2022-10-08 RX ADMIN — LEVOTHYROXINE SODIUM 25 MCG: 0.03 TABLET ORAL at 06:24

## 2022-10-08 NOTE — PROGRESS NOTES
Name: Holli Patel ADMIT: 2022   : 1965  PCP: Vandana Gastelum MD    MRN: [id number] LOS: 15 days   AGE/SEX: 57 y.o. female  ROOM: [facility]     Subjective   Subjective    Seen this morning.  No acute events overnight.  Pain well controlled.  She feels weak however was able to ambulate with assistance.    Review of Systems   As above  Objective   Objective   Vital Signs  Temp:  [97.3 °F (36.3 °C)-98.4 °F (36.9 °C)] 98.4 °F (36.9 °C)  Heart Rate:  [80-99] 99  Resp:  [16-18] 18  BP: (94-99)/(65-75) 94/67  SpO2:  [93 %-100 %] 97 %  on   ;   Device (Oxygen Therapy): room air  Body mass index is 26.59 kg/m².  Physical Exam    General: Alert, not in distress, chronically ill-appearing,  HEENT: Normocephalic, ,right eye/scalp bruising  CV: Regular rate and rhythm, no murmurs rubs or gallops  Lungs: CTA anteriorly, no wheezing, on room air  Abdomen: Soft, nontender, nondistended  Extremities: No significant peripheral edema , no cyanosis    Results Review     I reviewed the patient's new clinical results.  Results from last 7 days   Lab Units 10/08/22  0646 10/07/22  0517 10/06/22  0557 10/05/22  0546   WBC 10*3/mm3 10.55 11.08* 12.00* 12.08*   HEMOGLOBIN g/dL 9.1* 9.6* 9.9* 9.7*   PLATELETS 10*3/mm3 167 164 184 163     Results from last 7 days   Lab Units 10/08/22  0647 10/07/22  0517 10/06/22  0557 10/05/22  0546   SODIUM mmol/L 140 140 140 139   POTASSIUM mmol/L 3.1* 3.3* 3.8 3.8   CHLORIDE mmol/L 105 104 103 103   CO2 mmol/L 24.4 25.4 26.0 28.3   BUN mg/dL 15 16 19 19   CREATININE mg/dL 0.88 0.90 0.84 0.79   GLUCOSE mg/dL 92 104* 79 71   Estimated Creatinine Clearance: 63.8 mL/min (by C-G formula based on SCr of 0.88 mg/dL).  Results from last 7 days   Lab Units 10/08/22  0647 10/07/22  0517 10/06/22  0557 10/05/22  0546   ALBUMIN g/dL 3.30* 3.60 3.60 3.40*   BILIRUBIN mg/dL 0.4 0.4 0.3 0.3   ALK PHOS U/L 97 99 102 107   AST (SGOT) U/L 33* 28 29 31   ALT (SGPT) U/L 106* 99* 96* 93*     Results from  last 7 days   Lab Units 10/08/22  0647 10/07/22  0517 10/06/22  0557 10/05/22  0546 10/04/22  0600 10/03/22  0503 10/02/22  0622   CALCIUM mg/dL 8.1* 8.1* 8.4* 8.3*   < > 8.7 9.0   ALBUMIN g/dL 3.30* 3.60 3.60 3.40*   < > 3.50 3.60   MAGNESIUM mg/dL  --   --  2.1 2.0  --  1.8  --    PHOSPHORUS mg/dL  --   --   --   --   --  3.4 4.1    < > = values in this interval not displayed.       COVID19   Date Value Ref Range Status   08/03/2022 Not Detected Not Detected - Ref. Range Final   11/26/2021 Not Detected Not Detected - Ref. Range Final     No results found for: HGBA1C, POCGLU    CT Head Without Contrast  Narrative: CT HEAD WITHOUT CONTRAST     CLINICAL HISTORY: Multiple falls with scalp hematoma and headache.     TECHNIQUE: CT scan of the head was obtained with 3 mm axial soft tissue  algorithm and 2 mm bone algorithm images. No intravenous contrast was  administered. Sagittal and coronal reconstructions were obtained.     COMPARISON: Brain MRI dated 08/04/2022.     FINDINGS:       There is a prominent size right frontal convexity scalp hematoma which  extends into the right preseptal soft tissues. However, there is no  evidence for a calvarial fracture. There is no evidence for an acute  extra-axial hemorrhage.     The ventricles, sulci, and cisterns are age appropriate. There are mild  changes of chronic small vessel ischemic phenomena. The gray-white  matter differentiation is within normal limits. Old lacunar disease is  identified within the right caudate. The chronic small vessel ischemic  phenomena and old lacunar disease were also present on the most recent  brain MRI.     Incidental note is made of minor mucosal thickening within the maxillary  sinuses. Mild scattered mucosal thickening is seen within the ethmoid  air cells and the left aspect of the frontal sinus.     Impression:    Note is made of a prominent size right frontal convexity scalp hematoma  which extends into the right preseptal region.  However, there is no  evidence for acute traumatic intracranial pathology.     Incidental note is made of mild changes of chronic small vessel ischemic  phenomena as well as a chronic lacune within the right caudate.     Incidental note is also made of mild changes of inflammatory paranasal  sinus disease.     Radiation dose reduction techniques were utilized, including automated  exposure control and exposure modulation based on body size.     This report was finalized on 10/4/2022 7:34 AM by Dr. Stu Oates M.D.       Scheduled Medications  amLODIPine, 2.5 mg, Oral, Q24H  buPROPion XL, 150 mg, Oral, Daily  calcitriol, 1 mcg, Oral, Daily  cefTRIAXone, 1 g, Intravenous, Q24H  dexamethasone, 4 mg, Oral, Daily With Breakfast  DULoxetine, 30 mg, Oral, Nightly  enoxaparin, 40 mg, Subcutaneous, Q24H  folic acid, 1,000 mcg, Oral, Daily  letrozole, 2.5 mg, Oral, Daily  levothyroxine, 25 mcg, Oral, Q AM  oxyCODONE, 40 mg, Oral, Q12H  pantoprazole, 40 mg, Oral, QAM  senna-docusate sodium, 2 tablet, Oral, BID   And  polyethylene glycol, 17 g, Oral, Daily  potassium & sodium phosphates, 2 packet, Oral, BID AC  potassium chloride, 40 mEq, Oral, Once  sodium chloride, 10 mL, Intravenous, Q12H    Infusions   Diet  Diet Regular; Cardiac       Assessment/Plan     Active Hospital Problems    Diagnosis  POA   • **Cancer related pain [G89.3]  Yes   • Acute cystitis without hematuria [N30.00]  No   • Stage 3a chronic kidney disease (HCC) [N18.31]  Yes   • 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       Metastatic NSCLC with mets to bones/Cancer Related Pain: . Has been on Chemotherapy and Xgeva.  Oncology following.  On scheduled sustained-release oxycodone 40 mg every 12 hours, as needed oxycodone 15 mg every 4 hours,  dexamethasone, Cymbalta.  Pain improved.  On palliative radiation, to be completed on Tuesday 10/11/2022.      HTN: BP on the softer side, discontinue amlodipine monitor.  Hypothyroidism: Synthroid. TSH ok on repeat.  Thrush: Improved ACD and mildly  LFT: Liver ultrasound no acute findings.. Acute hepatitis panel negative.  Metabolic Encephalopathy: 2/2 medications, hospitalization, uti,  improved.  Falls:   E. coli UTI.  Continue IV ceftriaxone.  Hypokalemia: Ordered p.o. replacement.  Monitor daily.  PPx: Lovenox    Disposition: snf vs home pending clinical progress and when cleared by oncology        I wore protective equipment throughout this patient encounter including a face mask, gloves and protective eyewear.  Hand hygiene was performed before donning protective equipment and after removal when leaving the room.      Dictated utilizing Dragon dictation        Rubén Benjamin MD  [facility]  10/08/22  17:41 EDT

## 2022-10-08 NOTE — PROGRESS NOTES
[facility] GROUP INPATIENT PROGRESS NOTE    Length of Stay:  15 days    CHIEF COMPLAINT/REASON FOR VISIT:  Metastatic non-small cell lung cancer  Cancer-related pain    SUBJECTIVE:    Afebrile.  Mildly hypotensive but stable.  On room air.  Ambulated without assistance and without falls.     ROS:  14 systems reviewed with pertinent positives and negatives in the HPI. Reviewed today.    OBJECTIVE:  Vitals:    10/07/22 1917 10/07/22 2316 10/08/22 0622 10/08/22 0900   BP: 96/72 97/75 99/67 97/71   BP Location: Left arm Left arm Left arm Left arm   Patient Position: Lying Lying Lying Lying   Pulse: 96 90 80 97   Resp: 18 16 18 18   Temp: 97.6 °F (36.4 °C) 97.6 °F (36.4 °C) 97.3 °F (36.3 °C) 97.7 °F (36.5 °C)   TempSrc: Oral Oral Oral Oral   SpO2: 100% 97% 93% 99%   Weight:       Height:             PHYSICAL EXAMINATION:    General: NAD, alert, answers questions appropriately, some word finding difficulty  HEENT: R scalp and deandre-orbital hematoma with some movement to the left deandre-orbital area, stable to improved  Chest/Lungs: CTAB. Right chest mediport in place  Heart: RRR  Abdomen/GI: soft, NT, ND, NABS  Extremities: WWP, no edema or tenderness    DIAGNOSTIC DATA:  Results Review:     I reviewed the patient's new clinical results.    Results from last 7 days   Lab Units 10/08/22  0646   WBC 10*3/mm3 10.55   HEMOGLOBIN g/dL 9.1*   HEMATOCRIT % 29.1*   PLATELETS 10*3/mm3 167     Lab Results   Component Value Date    NEUTROABS 9.11 (H) 10/08/2022     Results from last 7 days   Lab Units 10/08/22  0647   SODIUM mmol/L 140   POTASSIUM mmol/L 3.1*   CHLORIDE mmol/L 105   CO2 mmol/L 24.4   BUN mg/dL 15   CREATININE mg/dL 0.88   GLUCOSE mg/dL 92   CALCIUM mg/dL 8.1*         Results from last 7 days   Lab Units 10/06/22  0557   MAGNESIUM mg/dL 2.1         IMAGING:    MRI Pelvis With & Without Contrast (09/24/2022 17:26)  CT Head Without Contrast (10/04/2022 07:20)    ASSESSMENT:  This is a 57 y.o. female with:      *Metastatic non-small cell lung cancer  · She was initially diagnosed with stage III (T1N2M0) disease.    · She received initial therapy with cisplatin and Alimta concurrent with radiation therapy beginning 5/25/2021.    · There were indeterminate findings in the lumbar spine at L1 and it was recommended to monitor this over time.    · Patient completed 3 cycles cisplatin and Alimta 7/7/2021 and completed radiation therapy 7/12/2021.    · PET scan 8/6/2021 showed good response to treatment.    · MRI lumbar spine 10/1/2021 with increase in size of the L1 lesion.    · Biopsy of the L1 lesion on 10/14/2022 confirmed metastatic adenocarcinoma of lung origin, PD-L1 TPS 0.    · PET scan 10/26/2021 with involvement of left adrenal and L1/L2 only.    · Patient received radiation therapy to L1/L2 on 11/19/2021.  She also received radiation to the left adrenal gland.    · She initiated further systemic therapy on 11/22/2021 with Keytruda and Alimta.    · Follow-up PET scan 1/18/2022 with decrease in small left upper lobe pulmonary nodule, resolution of activity at L1/L2, no new sites of disease.    · Guardant 360 CT DNA level was monitored and was decreasing.    · PET scan 4/11/2022 with progression in left adrenal and L1.    · Maintained systemic therapy with Keytruda and Alimta and received additional radiation to left adrenal and L1.  Alimta however held since 5/10/2022 due to renal dysfunction.  Radiation completed to lumbar spine 7/14/2022.    · PET scan 7/18/2022 with multiple areas of progression of the spine and right sacrum.    · Guardant 360 analysis 7/20/2022 with no actionable mutations.    · Change in therapy to single agent palliative Taxotere initiated 7/26/2022.    · Altered mental status and febrile neutropenia requiring hospitalization 8/2 - 8/9/2022.  During that admission, MRI brain, cervical, thoracic, lumbar spine performed 8/4/2022 in addition to CT cervical spine 8/5/2022.  There was evidence of C2  metastatic lesion with some dural enhancement, compression deformity at T7 with mild edema suggesting acute to subacute fracture, 5 mm T12 spinous process metastasis, multifocal disease in the lumbar spine and sacrum, largest involving sacral ala to the right 5 cm in transverse dimension.  Loss of height at L2 25%.  · Patient received cycle 3 Taxotere 9/6/2022 with Neulasta support.    · She has been continuing as well on Xgeva every 4 weeks (last received 9/6/2022).  · Patient did undergo MRI lumbar spine and pelvis on 9/24/2022.  MRI pelvis showed bilateral sacral fractures with marrow infiltration related to metastasis more prominent on the right.  Lesion on the right 3.5 x 4.1 x 4.3 cm.  Also probable metastasis along posterior acetabulum on the right 1.5 cm.  MRI lumbar spine with stable L1 metastasis, new edema endplate T12 possibly stress reaction versus developing new metastasis, lesion at T12 spinous process unchanged.   · Radiation oncology consulted with plans for palliative treatment of the right sacral metastasis.  Treatment initiated 9/28/2022  · Patient was going to have a PET scan which was delayed because she was admitted.     *History of bilateral stage I breast cancer  · Patient with bilateral breast cancer, both stage I ([id number]), grade 2, ER/OH positive and HER2/melinda negative with low Ki-67.    · Patient initiated adjuvant letrozole in May 2021.  · Patient continues to take letrozole daily.     *Cancer related pain  · Patient has been receiving Duragesic patch 50 mcg every 72 hours in addition to oxycodone 15 mg every 4 hours for breakthrough pain.  Duragesic dose had been escalated recently in the outpatient setting due to worsening pain  · On admission 9/23/2022, Duragesic patch increased to 75 mcg daily, added Decadron 4 mg IV every 6 hours with continuation of oxycodone 15 mg every 4 hours as needed for breakthrough pain in addition of Dilaudid 1 mg every 2 hours as needed for breakthrough  pain.  · Patient with increasing side effects from narcotics, Duragesic decreased on 9/25/2022 from 75 down to 50 mcg patch every 72 hours.  · Dexamethasone dose decreased to 4 mg p.o. every 12 hours on 9/27/2022  · Initiated palliative radiation to the right sacrum on 9/28/2022  · Patient became confused after receiving Dilaudid.  Patient and family asked to discontinue Dilaudid.  · Duragesic patch dose was increased to 75 mcg/h on 9/30/2022. Subsequent decrease to 50 mcg due to confusion  · Gabapentin was discontinued due to the sedating effect.  · Oxycodone is being used for breakthrough pain.  · 10/3 transition to sustained release oxycodone 40 mg bid and fentanyl patch stopped  · Pain well controlled at this time.      *Anxiety/depression  · Patient was previously on Wellbutrin  mg daily, Lexapro 10 mg daily.  · On 9/30/2022, she was switched from Lexapro to Cymbalta 30 mg daily.  · Atarax initiated for restlessness, improved     *Insomnia  · Patient was previously on Xanax but reported that it was not working.  · Patient previously tried melatonin and it was not effective.  · She was placed on Restoril 15 mg nightly on 10/1/2022.  · The patient had 3 falls since last night.  · Its not clear if this is due to Restoril but since this happened after the change, we switched back to Xanax-Home dose of 0.5 mg as needed for sleep.     *Anemia  · Most recent evaluation 8/3/2022 with iron 15, ferritin 276, iron saturation 5%, TIBC 328, folate greater than 20, B12 392  · Anemia felt to be related to malignancy/chronic disease and chemotherapy  · Hemoglobin has remained in the 8-10 range  · Hemoglobin is down a little at 9.1 today.     *Peripheral neuropathy  · Patient was on gabapentin 300 mg twice daily.  · Gabapentin was discontinued on 9/30/2022.  · She is currently on Cymbalta.     *Narcotic induced constipation  · Patient is on Sybil-Colace 2 tablets twice daily with MiraLAX daily   · Patient is now having  bowel movements.     *GI prophylaxis  · Protonix 40 mg daily     *DVT prophylaxis  · Lovenox 40 mg daily     *Elevated liver labs  · Atorvastatin was discontinued  · ?  Due to Diflucan (given to treat thrush).    · Stable    *Delirium  · Neurology following  · ? Hospital delirium +/- medications  · Improved    *BLE calf pain  · BLE venous duplex negative. Exam reassurring    *Fall with right scalp hematoma    *Deconditioning  · PT following. ? Subacute rehabilitation    *UTI  · Symptomatic   · Ucx with E coli, pansensitive  · On Rocephin    RECOMMENDATIONS:  1. Continue radiation, 10 fractions due to be complete on Tuesday  2. Continue sustained release oxycydone 40 mg q12h, added on 10/3 and fentanyl patch stopped. Pain improved.  3. Continue prn oxycodone, used 4 times yesterday.   4. Cymbalta at 30 mg daily  5. Continue dexamethasone at 4 mg in the morning  6. Xanax qhs prn  7. On Rocephin for UTI  8. At this point I would anticipate subacute rehabilitation and then outpatient with Dr. Jacobs as an outpatient when possible with an outpatient PET scan done prior to that. If there is progression, no standard therapy options remain and then there would be consideration of referral for a clinical trial if she's able. Discussed with Dr. Snell who has discussed with her  Jelani as well.  If she can improve by early to mid next week, maybe home. Otherwise subacute rehabilitation.  is going to try to get her out of bed more. She did ambulate today with assistance.     Will follow.  Dr. Snell will see her starting on Monday.      Eddie Cardenas MD

## 2022-10-08 NOTE — DOWNTIME EVENT NOTE
The EMR was down for 4.5 hours on 10/8/2022.    Mary heredia was responsible for completing the paper charting during this time period.     The following information was re-entered into the system by Mary Heredia RN: MAR    The following information will remain in the paper chart: n/a    Mary Heredia RN  10/8/2022

## 2022-10-08 NOTE — PLAN OF CARE
Problem: Adult Inpatient Plan of Care  Goal: Plan of Care Review  Outcome: Ongoing, Progressing  Flowsheets (Taken 10/8/2022 0810)  Progress: no change  Plan of Care Reviewed With: patient  Outcome Evaluation: Sitter at beside. VSS.  at bedside.  Goal: Patient-Specific Goal (Individualized)  Outcome: Ongoing, Progressing  Goal: Absence of Hospital-Acquired Illness or Injury  Outcome: Ongoing, Progressing  Intervention: Identify and Manage Fall Risk  Recent Flowsheet Documentation  Taken 10/8/2022 0450 by Mary Craft, RN  Safety Promotion/Fall Prevention:   activity supervised   assistive device/personal items within reach   clutter free environment maintained   safety round/check completed  Taken 10/8/2022 0230 by Mary Craft RN  Safety Promotion/Fall Prevention:   activity supervised   assistive device/personal items within reach   clutter free environment maintained   safety round/check completed  Taken 10/8/2022 0030 by Mary Craft RN  Safety Promotion/Fall Prevention:   activity supervised   assistive device/personal items within reach   clutter free environment maintained   fall prevention program maintained   safety round/check completed  Taken 10/7/2022 2210 by Mary Craft, RN  Safety Promotion/Fall Prevention:   activity supervised   assistive device/personal items within reach   clutter free environment maintained   safety round/check completed  Taken 10/7/2022 2054 by Mary Craft, RN  Safety Promotion/Fall Prevention:   activity supervised   assistive device/personal items within reach   clutter free environment maintained   safety round/check completed  Intervention: Prevent Skin Injury  Recent Flowsheet Documentation  Taken 10/7/2022 2100 by Mary Craft, RN  Skin Protection: adhesive use limited  Intervention: Prevent and Manage VTE (Venous Thromboembolism) Risk  Recent Flowsheet Documentation  Taken 10/7/2022 2054 by Mary Craft, RN  Activity Management: activity adjusted  per tolerance  VTE Prevention/Management:   patient refused intervention   sequential compression devices off   bilateral  Intervention: Prevent Infection  Recent Flowsheet Documentation  Taken 10/8/2022 0450 by Mary Craft RN  Infection Prevention:   hand hygiene promoted   single patient room provided  Taken 10/8/2022 0230 by Mary Craft RN  Infection Prevention: hand hygiene promoted  Taken 10/8/2022 0030 by Mary Craft RN  Infection Prevention:   environmental surveillance performed   hand hygiene promoted   rest/sleep promoted  Taken 10/7/2022 2210 by Mary Craft RN  Infection Prevention:   environmental surveillance performed   single patient room provided  Taken 10/7/2022 2054 by Mary Craft RN  Infection Prevention:   environmental surveillance performed   single patient room provided  Goal: Optimal Comfort and Wellbeing  Outcome: Ongoing, Progressing  Intervention: Monitor Pain and Promote Comfort  Recent Flowsheet Documentation  Taken 10/7/2022 2054 by Mary Craft RN  Pain Management Interventions: see MAR  Intervention: Provide Person-Centered Care  Recent Flowsheet Documentation  Taken 10/7/2022 2054 by Mary Craft RN  Trust Relationship/Rapport:   care explained   choices provided   thoughts/feelings acknowledged  Goal: Readiness for Transition of Care  Outcome: Ongoing, Progressing     Problem: Fall Injury Risk  Goal: Absence of Fall and Fall-Related Injury  Outcome: Ongoing, Progressing  Intervention: Identify and Manage Contributors  Recent Flowsheet Documentation  Taken 10/8/2022 0450 by Mary Craft RN  Medication Review/Management: medications reviewed  Taken 10/8/2022 0230 by Mary Craft RN  Medication Review/Management: medications reviewed  Taken 10/8/2022 0030 by Mary Craft RN  Medication Review/Management: medications reviewed  Taken 10/7/2022 2210 by Mary Craft RN  Medication Review/Management: medications reviewed  Taken 10/7/2022 2054 by  Mary Craft, RN  Medication Review/Management: medications reviewed  Intervention: Promote Injury-Free Environment  Recent Flowsheet Documentation  Taken 10/8/2022 0450 by Mary Craft RN  Safety Promotion/Fall Prevention:   activity supervised   assistive device/personal items within reach   clutter free environment maintained   safety round/check completed  Taken 10/8/2022 0230 by Mary Craft RN  Safety Promotion/Fall Prevention:   activity supervised   assistive device/personal items within reach   clutter free environment maintained   safety round/check completed  Taken 10/8/2022 0030 by Mary Craft RN  Safety Promotion/Fall Prevention:   activity supervised   assistive device/personal items within reach   clutter free environment maintained   fall prevention program maintained   safety round/check completed  Taken 10/7/2022 2210 by Mary Craft RN  Safety Promotion/Fall Prevention:   activity supervised   assistive device/personal items within reach   clutter free environment maintained   safety round/check completed  Taken 10/7/2022 2054 by Mary Craft RN  Safety Promotion/Fall Prevention:   activity supervised   assistive device/personal items within reach   clutter free environment maintained   safety round/check completed     Problem: Skin Injury Risk Increased  Goal: Skin Health and Integrity  Outcome: Ongoing, Progressing  Intervention: Optimize Skin Protection  Recent Flowsheet Documentation  Taken 10/7/2022 2100 by Mary Craft RN  Pressure Reduction Techniques: frequent weight shift encouraged  Skin Protection: adhesive use limited   Goal Outcome Evaluation:  Plan of Care Reviewed With: patient        Progress: no change  Outcome Evaluation: Sitter at beside. VSS.  at bedside.

## 2022-10-09 LAB
ALBUMIN SERPL-MCNC: 3.3 G/DL (ref 3.5–5.2)
ALBUMIN/GLOB SERPL: 1.6 G/DL
ALP SERPL-CCNC: 94 U/L (ref 39–117)
ALT SERPL W P-5'-P-CCNC: 103 U/L (ref 1–33)
ANION GAP SERPL CALCULATED.3IONS-SCNC: 7.3 MMOL/L (ref 5–15)
AST SERPL-CCNC: 28 U/L (ref 1–32)
BILIRUB SERPL-MCNC: 0.3 MG/DL (ref 0–1.2)
BUN SERPL-MCNC: 14 MG/DL (ref 6–20)
BUN/CREAT SERPL: 17.3 (ref 7–25)
CALCIUM SPEC-SCNC: 8.5 MG/DL (ref 8.6–10.5)
CHLORIDE SERPL-SCNC: 109 MMOL/L (ref 98–107)
CO2 SERPL-SCNC: 23.7 MMOL/L (ref 22–29)
CREAT SERPL-MCNC: 0.81 MG/DL (ref 0.57–1)
DEPRECATED RDW RBC AUTO: 58.1 FL (ref 37–54)
EGFRCR SERPLBLD CKD-EPI 2021: 83.7 ML/MIN/1.73
ERYTHROCYTE [DISTWIDTH] IN BLOOD BY AUTOMATED COUNT: 18.4 % (ref 12.3–15.4)
GLOBULIN UR ELPH-MCNC: 2.1 GM/DL
GLUCOSE SERPL-MCNC: 77 MG/DL (ref 65–99)
HCT VFR BLD AUTO: 27.8 % (ref 34–46.6)
HGB BLD-MCNC: 8.8 G/DL (ref 12–15.9)
MCH RBC QN AUTO: 28.4 PG (ref 26.6–33)
MCHC RBC AUTO-ENTMCNC: 31.7 G/DL (ref 31.5–35.7)
MCV RBC AUTO: 89.7 FL (ref 79–97)
PLATELET # BLD AUTO: 170 10*3/MM3 (ref 140–450)
PMV BLD AUTO: 10.5 FL (ref 6–12)
POTASSIUM SERPL-SCNC: 4 MMOL/L (ref 3.5–5.2)
PROT SERPL-MCNC: 5.4 G/DL (ref 6–8.5)
RBC # BLD AUTO: 3.1 10*6/MM3 (ref 3.77–5.28)
SODIUM SERPL-SCNC: 140 MMOL/L (ref 136–145)
WBC NRBC COR # BLD: 7.91 10*3/MM3 (ref 3.4–10.8)

## 2022-10-09 PROCEDURE — 80053 COMPREHEN METABOLIC PANEL: CPT | Performed by: INTERNAL MEDICINE

## 2022-10-09 PROCEDURE — 99232 SBSQ HOSP IP/OBS MODERATE 35: CPT | Performed by: INTERNAL MEDICINE

## 2022-10-09 PROCEDURE — 25010000002 CEFTRIAXONE PER 250 MG: Performed by: INTERNAL MEDICINE

## 2022-10-09 PROCEDURE — 85027 COMPLETE CBC AUTOMATED: CPT | Performed by: STUDENT IN AN ORGANIZED HEALTH CARE EDUCATION/TRAINING PROGRAM

## 2022-10-09 PROCEDURE — 25010000002 ENOXAPARIN PER 10 MG: Performed by: INTERNAL MEDICINE

## 2022-10-09 PROCEDURE — 63710000001 DEXAMETHASONE PER 0.25 MG: Performed by: INTERNAL MEDICINE

## 2022-10-09 RX ADMIN — HYDROXYZINE HYDROCHLORIDE 25 MG: 25 TABLET ORAL at 06:07

## 2022-10-09 RX ADMIN — Medication 10 ML: at 08:49

## 2022-10-09 RX ADMIN — DOCUSATE SODIUM 50MG AND SENNOSIDES 8.6MG 2 TABLET: 8.6; 5 TABLET, FILM COATED ORAL at 08:49

## 2022-10-09 RX ADMIN — LETROZOLE 2.5 MG: 2.5 TABLET, FILM COATED ORAL at 08:50

## 2022-10-09 RX ADMIN — Medication 10 ML: at 20:55

## 2022-10-09 RX ADMIN — OXYCODONE HYDROCHLORIDE 15 MG: 15 TABLET ORAL at 16:32

## 2022-10-09 RX ADMIN — LEVOTHYROXINE SODIUM 25 MCG: 0.03 TABLET ORAL at 06:07

## 2022-10-09 RX ADMIN — CEFTRIAXONE SODIUM 1 G: 1 INJECTION, POWDER, FOR SOLUTION INTRAMUSCULAR; INTRAVENOUS at 08:49

## 2022-10-09 RX ADMIN — BUPROPION HYDROCHLORIDE 150 MG: 150 TABLET, EXTENDED RELEASE ORAL at 08:49

## 2022-10-09 RX ADMIN — OXYCODONE HYDROCHLORIDE 15 MG: 15 TABLET ORAL at 12:36

## 2022-10-09 RX ADMIN — Medication 2 PACKET: at 08:49

## 2022-10-09 RX ADMIN — OXYCODONE HYDROCHLORIDE 40 MG: 15 TABLET, FILM COATED, EXTENDED RELEASE ORAL at 20:53

## 2022-10-09 RX ADMIN — Medication 2 PACKET: at 16:32

## 2022-10-09 RX ADMIN — CALCITRIOL 1 MCG: 0.25 CAPSULE ORAL at 08:49

## 2022-10-09 RX ADMIN — ACETAMINOPHEN 650 MG: 325 TABLET, FILM COATED ORAL at 18:45

## 2022-10-09 RX ADMIN — PANTOPRAZOLE SODIUM 40 MG: 40 TABLET, DELAYED RELEASE ORAL at 06:07

## 2022-10-09 RX ADMIN — Medication 1000 MCG: at 08:49

## 2022-10-09 RX ADMIN — HYDROXYZINE HYDROCHLORIDE 25 MG: 25 TABLET ORAL at 18:45

## 2022-10-09 RX ADMIN — OXYCODONE HYDROCHLORIDE 15 MG: 15 TABLET ORAL at 06:07

## 2022-10-09 RX ADMIN — DEXAMETHASONE 4 MG: 4 TABLET ORAL at 08:49

## 2022-10-09 RX ADMIN — POLYETHYLENE GLYCOL 3350 17 G: 17 POWDER, FOR SOLUTION ORAL at 08:50

## 2022-10-09 RX ADMIN — OXYCODONE HYDROCHLORIDE 40 MG: 15 TABLET, FILM COATED, EXTENDED RELEASE ORAL at 08:50

## 2022-10-09 RX ADMIN — ALPRAZOLAM 0.5 MG: 0.5 TABLET ORAL at 20:59

## 2022-10-09 RX ADMIN — DULOXETINE HYDROCHLORIDE 30 MG: 30 CAPSULE, DELAYED RELEASE ORAL at 20:58

## 2022-10-09 RX ADMIN — ENOXAPARIN SODIUM 40 MG: 100 INJECTION SUBCUTANEOUS at 15:48

## 2022-10-09 NOTE — SIGNIFICANT NOTE
10/09/22 1347   OTHER   Discipline physical therapist   Rehab Time/Intention   Session Not Performed other (see comments)  (PT attempted to treat pt 2x while on floor. First attempt, pt requests hold per HA. Second attempt, pt states that her cousin is on her way to visit. Advised pt to attempt to get up to chair with nsg for dinner to inc mobility. Pt verb understanding.)   Recommendation   PT - Next Appointment 10/10/22

## 2022-10-09 NOTE — PLAN OF CARE
Goal Outcome Evaluation:   Patient A&Ox4 continues to complain of pain - ms contin & tylenol given as requested.  Atarax for anxiety x1.  Purewick removed per patient's request.  Up with assist with walker.  Refused PT today.  VSS.  Will continue to monitor.

## 2022-10-09 NOTE — PLAN OF CARE
Goal Outcome Evaluation:  Plan of Care Reviewed With: patient        Progress: no change  Outcome Evaluation: VSS,  and sitter at bedside. PO pain meds given PRN, Xanax given PRN. Port dressing c/d/i and capped. Pt had increased complaints of right leg pain. External cath in place. Potassium PO given as ordered.

## 2022-10-09 NOTE — PROGRESS NOTES
[facility] INPATIENT PROGRESS NOTE    Length of Stay:  16 days    CHIEF COMPLAINT/REASON FOR VISIT:  Metastatic non-small cell lung cancer  Cancer-related pain    SUBJECTIVE:    Afebrile.  Normotensive to mildly hypotensive.  On room air.  Denies new problems. Ambulatory with assistance.     ROS:  14 systems reviewed with pertinent positives and negatives in the HPI. Reviewed today.    OBJECTIVE:  Vitals:    10/08/22 1900 10/08/22 2300 10/09/22 0400 10/09/22 0821   BP: 100/86 101/71 100/82 103/77   BP Location: Left arm Left arm Left arm Left arm   Patient Position: Sitting Lying Lying Lying   Pulse: 104 84 82 85   Resp: 18 19 16 14   Temp: 97.9 °F (36.6 °C) 97 °F (36.1 °C) 97.2 °F (36.2 °C) 97.2 °F (36.2 °C)   TempSrc: Oral Oral Oral Oral   SpO2: 98% 98% 98% 95%   Weight:       Height:             PHYSICAL EXAMINATION:    General: NAD, alert, answers questions appropriately  HEENT: R scalp and deandre-orbital hematoma with some movement to the left denadre-orbital area, stable to improved again today  Chest/Lungs: CTAB. Right chest mediport in place  Heart: RRR  Abdomen/GI: soft, NT, ND, NABS  Extremities: WWP, no edema or tenderness    DIAGNOSTIC DATA:  Results Review:     I reviewed the patient's new clinical results.    Results from last 7 days   Lab Units 10/09/22  0544   WBC 10*3/mm3 7.91   HEMOGLOBIN g/dL 8.8*   HEMATOCRIT % 27.8*   PLATELETS 10*3/mm3 170     Lab Results   Component Value Date    NEUTROABS 9.11 (H) 10/08/2022     Results from last 7 days   Lab Units 10/09/22  0544   SODIUM mmol/L 140   POTASSIUM mmol/L 4.0   CHLORIDE mmol/L 109*   CO2 mmol/L 23.7   BUN mg/dL 14   CREATININE mg/dL 0.81   GLUCOSE mg/dL 77   CALCIUM mg/dL 8.5*         Results from last 7 days   Lab Units 10/06/22  0557   MAGNESIUM mg/dL 2.1         IMAGING:    MRI Pelvis With & Without Contrast (09/24/2022 17:26)  CT Head Without Contrast (10/04/2022 07:20)    ASSESSMENT:  This is a 57 y.o. female with:     *Metastatic  non-small cell lung cancer  · She was initially diagnosed with stage III (T1N2M0) disease.    · She received initial therapy with cisplatin and Alimta concurrent with radiation therapy beginning 5/25/2021.    · There were indeterminate findings in the lumbar spine at L1 and it was recommended to monitor this over time.    · Patient completed 3 cycles cisplatin and Alimta 7/7/2021 and completed radiation therapy 7/12/2021.    · PET scan 8/6/2021 showed good response to treatment.    · MRI lumbar spine 10/1/2021 with increase in size of the L1 lesion.    · Biopsy of the L1 lesion on 10/14/2022 confirmed metastatic adenocarcinoma of lung origin, PD-L1 TPS 0.    · PET scan 10/26/2021 with involvement of left adrenal and L1/L2 only.    · Patient received radiation therapy to L1/L2 on 11/19/2021.  She also received radiation to the left adrenal gland.    · She initiated further systemic therapy on 11/22/2021 with Keytruda and Alimta.    · Follow-up PET scan 1/18/2022 with decrease in small left upper lobe pulmonary nodule, resolution of activity at L1/L2, no new sites of disease.    · Guardant 360 CT DNA level was monitored and was decreasing.    · PET scan 4/11/2022 with progression in left adrenal and L1.    · Maintained systemic therapy with Keytruda and Alimta and received additional radiation to left adrenal and L1.  Alimta however held since 5/10/2022 due to renal dysfunction.  Radiation completed to lumbar spine 7/14/2022.    · PET scan 7/18/2022 with multiple areas of progression of the spine and right sacrum.    · Guardant 360 analysis 7/20/2022 with no actionable mutations.    · Change in therapy to single agent palliative Taxotere initiated 7/26/2022.    · Altered mental status and febrile neutropenia requiring hospitalization 8/2 - 8/9/2022.  During that admission, MRI brain, cervical, thoracic, lumbar spine performed 8/4/2022 in addition to CT cervical spine 8/5/2022.  There was evidence of C2 metastatic  lesion with some dural enhancement, compression deformity at T7 with mild edema suggesting acute to subacute fracture, 5 mm T12 spinous process metastasis, multifocal disease in the lumbar spine and sacrum, largest involving sacral ala to the right 5 cm in transverse dimension.  Loss of height at L2 25%.  · Patient received cycle 3 Taxotere 9/6/2022 with Neulasta support.    · She has been continuing as well on Xgeva every 4 weeks (last received 9/6/2022).  · Patient did undergo MRI lumbar spine and pelvis on 9/24/2022.  MRI pelvis showed bilateral sacral fractures with marrow infiltration related to metastasis more prominent on the right.  Lesion on the right 3.5 x 4.1 x 4.3 cm.  Also probable metastasis along posterior acetabulum on the right 1.5 cm.  MRI lumbar spine with stable L1 metastasis, new edema endplate T12 possibly stress reaction versus developing new metastasis, lesion at T12 spinous process unchanged.   · Radiation oncology consulted with plans for palliative treatment of the right sacral metastasis.  Treatment initiated 9/28/2022  · Patient was going to have a PET scan which was delayed because she was admitted.  · Hoping for clinical improvement such that she can receive an outpatient PET scan and then follow-up in the office.     *History of bilateral stage I breast cancer  · Patient with bilateral breast cancer, both stage I ([id number]), grade 2, ER/ND positive and HER2/melinda negative with low Ki-67.    · Patient initiated adjuvant letrozole in May 2021.  · Patient continues to take letrozole daily.     *Cancer related pain  · Patient has been receiving Duragesic patch 50 mcg every 72 hours in addition to oxycodone 15 mg every 4 hours for breakthrough pain.  Duragesic dose had been escalated recently in the outpatient setting due to worsening pain  · On admission 9/23/2022, Duragesic patch increased to 75 mcg daily, added Decadron 4 mg IV every 6 hours with continuation of oxycodone 15 mg every 4  hours as needed for breakthrough pain in addition of Dilaudid 1 mg every 2 hours as needed for breakthrough pain.  · Patient with increasing side effects from narcotics, Duragesic decreased on 9/25/2022 from 75 down to 50 mcg patch every 72 hours.  · Dexamethasone dose decreased to 4 mg p.o. every 12 hours on 9/27/2022  · Initiated palliative radiation to the right sacrum on 9/28/2022  · Patient became confused after receiving Dilaudid.  Patient and family asked to discontinue Dilaudid.  · Duragesic patch dose was increased to 75 mcg/h on 9/30/2022. Subsequent decrease to 50 mcg due to confusion  · Gabapentin was discontinued due to the sedating effect.  · Oxycodone is being used for breakthrough pain.  · 10/3 transition to sustained release oxycodone 40 mg bid and fentanyl patch stopped  · Pain well controlled at this time     *Anxiety/depression  · Patient was previously on Wellbutrin  mg daily, Lexapro 10 mg daily.  · On 9/30/2022, she was switched from Lexapro to Cymbalta 30 mg daily.  · Atarax initiated for restlessness, improved     *Insomnia  · Patient was previously on Xanax but reported that it was not working.  · Patient previously tried melatonin and it was not effective.  · She was placed on Restoril 15 mg nightly on 10/1/2022.  · The patient had 3 falls since last night.  · Its not clear if this is due to Restoril but since this happened after the change, we switched back to Xanax-Home dose of 0.5 mg as needed for sleep.     *Anemia  · Most recent evaluation 8/3/2022 with iron 15, ferritin 276, iron saturation 5%, TIBC 328, folate greater than 20, B12 392  · Anemia felt to be related to malignancy/chronic disease and chemotherapy  · Hemoglobin has remained in the 8-10 range  · Hemoglobin is 8.8, overall down a little bit     *Peripheral neuropathy  · Patient was on gabapentin 300 mg twice daily.  · Gabapentin was discontinued on 9/30/2022.  · She is currently on Cymbalta.     *Narcotic induced  constipation  · Patient is on Sybil-Colace 2 tablets twice daily with MiraLAX daily   · Patient is now having bowel movements.     *GI prophylaxis  · Protonix 40 mg daily     *DVT prophylaxis  · Lovenox 40 mg daily     *Elevated liver labs  · Atorvastatin was discontinued  · ?  Due to Diflucan (given to treat thrush).    · Stable    *Delirium  · Neurology saw her  · ? Hospital delirium +/- medications  · Improved significantly    *BLE calf pain  · BLE venous duplex negative. Exam reassurring    *Fall with right scalp hematoma    *Deconditioning  · PT following. ? Subacute rehabilitation    *UTI  · Symptomatic   · Ucx with E coli, pansensitive  · On Rocephin    RECOMMENDATIONS:  1. Continue radiation, 10 fractions due to be complete this week on Tuesday  2. Continue sustained release oxycydone 40 mg q12h, added on 10/3 and fentanyl patch stopped. Pain overall has improved significantly.  3. Continue as needed oxycodone  4. Cymbalta at 30 mg daily  5. Continue dexamethasone at 4 mg in the morning  6. Xanax qhs prn  7. On Rocephin for UTI  8. Subacute rehabilitation versus home if she improves enough prior to discharge.  Outpatient PET scan then outpatient follow-up.    Will follow.  Dr. Snell will see her starting tomorrow.      Eddie Cardenas MD

## 2022-10-09 NOTE — PROGRESS NOTES
Name: Holli Patel ADMIT: 2022   : 1965  PCP: Vandana Gastelum MD    MRN: [id number] LOS: 16 days   AGE/SEX: 57 y.o. female  [facility]     Subjective   Subjective   Lying in bed.  No acute events overnight.  States pain is minimal this morning.  No fevers no chills.      Review of Systems   As above  Objective   Objective   Vital Signs  Temp:  [97 °F (36.1 °C)-98.7 °F (37.1 °C)] 98.7 °F (37.1 °C)  Heart Rate:  [] 116  Resp:  [14-19] 18  BP: (100-108)/(71-86) 108/72  SpO2:  [95 %-99 %] 99 %  on   ;   Device (Oxygen Therapy): room air  Body mass index is 26.59 kg/m².  Physical Exam  General: Alert, not in distress, chronically ill-appearing,  HEENT: Normocephalic, ,right eye/scalp bruising  CV: Regular rate and rhythm, no murmurs rubs or gallops  Lungs: CTA anteriorly, no wheezing, on room air  Extremities: No significant peripheral edema , no cyanosis    Results Review     I reviewed the patient's new clinical results.  Results from last 7 days   Lab Units 10/09/22  0544 10/08/22  0646 10/07/22  0517 10/06/22  0557   WBC 10*3/mm3 7.91 10.55 11.08* 12.00*   HEMOGLOBIN g/dL 8.8* 9.1* 9.6* 9.9*   PLATELETS 10*3/mm3 170 167 164 184     Results from last 7 days   Lab Units 10/09/22  0544 10/08/22  0647 10/07/22  0517 10/06/22  0557   SODIUM mmol/L 140 140 140 140   POTASSIUM mmol/L 4.0 3.1* 3.3* 3.8   CHLORIDE mmol/L 109* 105 104 103   CO2 mmol/L 23.7 24.4 25.4 26.0   BUN mg/dL 14 15 16 19   CREATININE mg/dL 0.81 0.88 0.90 0.84   GLUCOSE mg/dL 77 92 104* 79   Estimated Creatinine Clearance: 69.3 mL/min (by C-G formula based on SCr of 0.81 mg/dL).  Results from last 7 days   Lab Units 10/09/22  0544 10/08/22  0647 10/07/22  0517 10/06/22  0557   ALBUMIN g/dL 3.30* 3.30* 3.60 3.60   BILIRUBIN mg/dL 0.3 0.4 0.4 0.3   ALK PHOS U/L 94 97 99 102   AST (SGOT) U/L 28 33* 28 29   ALT (SGPT) U/L 103* 106* 99* 96*     Results from last 7 days   Lab Units 10/09/22  0544 10/08/22  0647 10/07/22  0517  10/06/22  0557 10/05/22  0546 10/04/22  0600 10/03/22  0503   CALCIUM mg/dL 8.5* 8.1* 8.1* 8.4* 8.3*   < > 8.7   ALBUMIN g/dL 3.30* 3.30* 3.60 3.60 3.40*   < > 3.50   MAGNESIUM mg/dL  --   --   --  2.1 2.0  --  1.8   PHOSPHORUS mg/dL  --   --   --   --   --   --  3.4    < > = values in this interval not displayed.       COVID19   Date Value Ref Range Status   08/03/2022 Not Detected Not Detected - Ref. Range Final   11/26/2021 Not Detected Not Detected - Ref. Range Final     No results found for: HGBA1C, POCGLU    CT Head Without Contrast  Narrative: CT HEAD WITHOUT CONTRAST     CLINICAL HISTORY: Multiple falls with scalp hematoma and headache.     TECHNIQUE: CT scan of the head was obtained with 3 mm axial soft tissue  algorithm and 2 mm bone algorithm images. No intravenous contrast was  administered. Sagittal and coronal reconstructions were obtained.     COMPARISON: Brain MRI dated 08/04/2022.     FINDINGS:       There is a prominent size right frontal convexity scalp hematoma which  extends into the right preseptal soft tissues. However, there is no  evidence for a calvarial fracture. There is no evidence for an acute  extra-axial hemorrhage.     The ventricles, sulci, and cisterns are age appropriate. There are mild  changes of chronic small vessel ischemic phenomena. The gray-white  matter differentiation is within normal limits. Old lacunar disease is  identified within the right caudate. The chronic small vessel ischemic  phenomena and old lacunar disease were also present on the most recent  brain MRI.     Incidental note is made of minor mucosal thickening within the maxillary  sinuses. Mild scattered mucosal thickening is seen within the ethmoid  air cells and the left aspect of the frontal sinus.     Impression:    Note is made of a prominent size right frontal convexity scalp hematoma  which extends into the right preseptal region. However, there is no  evidence for acute traumatic intracranial  pathology.     Incidental note is made of mild changes of chronic small vessel ischemic  phenomena as well as a chronic lacune within the right caudate.     Incidental note is also made of mild changes of inflammatory paranasal  sinus disease.     Radiation dose reduction techniques were utilized, including automated  exposure control and exposure modulation based on body size.     This report was finalized on 10/4/2022 7:34 AM by Dr. Stu Oates M.D.       Scheduled Medications  buPROPion XL, 150 mg, Oral, Daily  calcitriol, 1 mcg, Oral, Daily  dexamethasone, 4 mg, Oral, Daily With Breakfast  DULoxetine, 30 mg, Oral, Nightly  enoxaparin, 40 mg, Subcutaneous, Q24H  folic acid, 1,000 mcg, Oral, Daily  letrozole, 2.5 mg, Oral, Daily  levothyroxine, 25 mcg, Oral, Q AM  oxyCODONE, 40 mg, Oral, Q12H  pantoprazole, 40 mg, Oral, QAM  senna-docusate sodium, 2 tablet, Oral, BID   And  polyethylene glycol, 17 g, Oral, Daily  potassium & sodium phosphates, 2 packet, Oral, BID AC  sodium chloride, 10 mL, Intravenous, Q12H    Infusions   Diet  Diet Regular; Cardiac       Assessment/Plan     Active Hospital Problems    Diagnosis  POA   • **Cancer related pain [G89.3]  Yes   • Acute cystitis without hematuria [N30.00]  No   • Stage 3a chronic kidney disease (HCC) [N18.31]  Yes   • 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       Metastatic NSCLC with mets to bones/Cancer Related Pain: . Has been on Chemotherapy and Xgeva.  Oncology following.On palliative radiation, to be completed on Tuesday 10/11/2022.  Pain well controlled, management per oncology.        HTN: BP on the softer side, amlodipine discontinued.  Hypothyroidism: TSH normal.  Continues levothyroxine.  Thrush: Improved   LFT: Liver ultrasound no acute findings..  Acute hepatitis panel negative.  Metabolic Encephalopathy: 2/2 medications, hospitalization, uti,  improved. alert, oriented x3.,  Falls:  PT OT  E. coli UTI.    Completed 5-day course of IV ceftriaxone.    DVT PPx: Lovenox  CODE STATUS: Full code     Disposition: snf vs home pending clinical progress and when cleared by oncology    I wore protective equipment throughout this patient encounter including a face mask, gloves and protective eyewear.  Hand hygiene was performed before donning protective equipment and after removal when leaving the room.      Dictated utilizing Dragon dictation        Rubén Benjamin MD  [facility]  10/09/22  17:04 EDT

## 2022-10-10 ENCOUNTER — TREATMENT (OUTPATIENT)
Dept: RADIATION ONCOLOGY | Facility: HOSPITAL | Age: 57
End: 2022-10-10

## 2022-10-10 LAB
RAD ONC ARIA COURSE ID: NORMAL
RAD ONC ARIA COURSE INTENT: NORMAL
RAD ONC ARIA COURSE LAST TREATMENT DATE: NORMAL
RAD ONC ARIA COURSE START DATE: NORMAL
RAD ONC ARIA COURSE TREATMENT ELAPSED DAYS: 12
RAD ONC ARIA FIRST TREATMENT DATE: NORMAL
RAD ONC ARIA PLAN FRACTIONS TREATED TO DATE: 9
RAD ONC ARIA PLAN ID: NORMAL
RAD ONC ARIA PLAN PRESCRIBED DOSE PER FRACTION: 3 GY
RAD ONC ARIA PLAN PRIMARY REFERENCE POINT: NORMAL
RAD ONC ARIA PLAN TOTAL FRACTIONS PRESCRIBED: 10
RAD ONC ARIA PLAN TOTAL PRESCRIBED DOSE: 3000 CGY
RAD ONC ARIA REFERENCE POINT DOSAGE GIVEN TO DATE: 27 GY
RAD ONC ARIA REFERENCE POINT DOSAGE GIVEN TO DATE: 27.26 GY
RAD ONC ARIA REFERENCE POINT ID: NORMAL
RAD ONC ARIA REFERENCE POINT ID: NORMAL
RAD ONC ARIA REFERENCE POINT SESSION DOSAGE GIVEN: 3 GY
RAD ONC ARIA REFERENCE POINT SESSION DOSAGE GIVEN: 3.03 GY

## 2022-10-10 PROCEDURE — 99232 SBSQ HOSP IP/OBS MODERATE 35: CPT | Performed by: INTERNAL MEDICINE

## 2022-10-10 PROCEDURE — 97530 THERAPEUTIC ACTIVITIES: CPT

## 2022-10-10 PROCEDURE — 77386: CPT | Performed by: RADIOLOGY

## 2022-10-10 PROCEDURE — 63710000001 DEXAMETHASONE PER 0.25 MG: Performed by: INTERNAL MEDICINE

## 2022-10-10 PROCEDURE — 25010000002 ENOXAPARIN PER 10 MG: Performed by: INTERNAL MEDICINE

## 2022-10-10 PROCEDURE — 77014 CHG CT GUIDANCE RADIATION THERAPY FLDS PLACEMENT: CPT | Performed by: RADIOLOGY

## 2022-10-10 PROCEDURE — 77386 CHG INTENSITY MODULATED RADIATION TX DLVR COMPLEX: CPT | Performed by: RADIOLOGY

## 2022-10-10 RX ADMIN — Medication 2 PACKET: at 06:04

## 2022-10-10 RX ADMIN — CALCITRIOL 1 MCG: 0.25 CAPSULE ORAL at 09:18

## 2022-10-10 RX ADMIN — OXYCODONE HYDROCHLORIDE 15 MG: 15 TABLET ORAL at 06:59

## 2022-10-10 RX ADMIN — OXYCODONE HYDROCHLORIDE 40 MG: 15 TABLET, FILM COATED, EXTENDED RELEASE ORAL at 21:01

## 2022-10-10 RX ADMIN — POLYETHYLENE GLYCOL 3350 17 G: 17 POWDER, FOR SOLUTION ORAL at 09:18

## 2022-10-10 RX ADMIN — OXYCODONE HYDROCHLORIDE 15 MG: 15 TABLET ORAL at 16:24

## 2022-10-10 RX ADMIN — PANTOPRAZOLE SODIUM 40 MG: 40 TABLET, DELAYED RELEASE ORAL at 06:05

## 2022-10-10 RX ADMIN — HYDROXYZINE HYDROCHLORIDE 25 MG: 25 TABLET ORAL at 06:05

## 2022-10-10 RX ADMIN — BUPROPION HYDROCHLORIDE 150 MG: 150 TABLET, EXTENDED RELEASE ORAL at 09:18

## 2022-10-10 RX ADMIN — ALPRAZOLAM 0.5 MG: 0.5 TABLET ORAL at 21:01

## 2022-10-10 RX ADMIN — Medication 10 ML: at 09:19

## 2022-10-10 RX ADMIN — DEXAMETHASONE 4 MG: 4 TABLET ORAL at 09:18

## 2022-10-10 RX ADMIN — LEVOTHYROXINE SODIUM 25 MCG: 0.03 TABLET ORAL at 06:08

## 2022-10-10 RX ADMIN — DULOXETINE HYDROCHLORIDE 30 MG: 30 CAPSULE, DELAYED RELEASE ORAL at 21:01

## 2022-10-10 RX ADMIN — LETROZOLE 2.5 MG: 2.5 TABLET, FILM COATED ORAL at 09:18

## 2022-10-10 RX ADMIN — Medication 2 PACKET: at 16:30

## 2022-10-10 RX ADMIN — OXYCODONE HYDROCHLORIDE 40 MG: 15 TABLET, FILM COATED, EXTENDED RELEASE ORAL at 09:19

## 2022-10-10 RX ADMIN — OXYCODONE HYDROCHLORIDE 15 MG: 15 TABLET ORAL at 23:02

## 2022-10-10 RX ADMIN — Medication 1000 MCG: at 09:18

## 2022-10-10 RX ADMIN — Medication 10 ML: at 21:04

## 2022-10-10 RX ADMIN — ENOXAPARIN SODIUM 40 MG: 100 INJECTION SUBCUTANEOUS at 16:23

## 2022-10-10 RX ADMIN — DOCUSATE SODIUM 50MG AND SENNOSIDES 8.6MG 2 TABLET: 8.6; 5 TABLET, FILM COATED ORAL at 09:18

## 2022-10-10 RX ADMIN — OXYCODONE HYDROCHLORIDE 15 MG: 15 TABLET ORAL at 12:11

## 2022-10-10 NOTE — PROGRESS NOTES
Name: Holli Patel ADMIT: 2022   : 1965  PCP: Vandana Gastelum MD    MRN: [id number] LOS: 17 days   AGE/SEX: 57 y.o. female  ROOM: [facility]     Subjective   Subjective   Patient appears generally weak, relatively comfortable, & in no apparent distress.  Tolerating diet & encouraged PT today.   at bedside.    Review of Systems   Constitutional: Negative for chills and fever.   Respiratory: Negative for cough and shortness of breath.    Cardiovascular: Negative for chest pain and leg swelling.   Gastrointestinal: Negative for abdominal pain, constipation, diarrhea, nausea and vomiting.   Genitourinary: Negative for difficulty urinating and dysuria.   Musculoskeletal: Positive for gait problem (due to generalized weakness). Negative for myalgias.        Objective   Objective   Vital Signs  Temp:  [97 °F (36.1 °C)-98.8 °F (37.1 °C)] 97.3 °F (36.3 °C)  Heart Rate:  [86-92] 88  Resp:  [16-18] 18  BP: (108-126)/(81-88) 110/85  SpO2:  [97 %-98 %] 97 %  on   ;   Device (Oxygen Therapy): room air  Body mass index is 25.54 kg/m².     Physical Exam  Constitutional:       General: She is not in acute distress.     Appearance: She is obese. She is not toxic-appearing.   Eyes:      Comments: ecchymosis right eye   Cardiovascular:      Rate and Rhythm: Normal rate.      Heart sounds: Normal heart sounds.   Pulmonary:      Effort: Pulmonary effort is normal.      Breath sounds: Normal breath sounds.   Abdominal:      General: Bowel sounds are normal.      Palpations: Abdomen is soft.   Musculoskeletal:      Right lower leg: No edema.      Left lower leg: No edema.   Skin:     General: Skin is warm and dry.   Neurological:      Mental Status: She is alert.       Results Review     I reviewed the patient's new clinical results.  Results from last 7 days   Lab Units 10/09/22  0544 10/08/22  0646 10/07/22  0517 10/06/22  0557   WBC 10*3/mm3 7.91 10.55 11.08* 12.00*   HEMOGLOBIN g/dL 8.8* 9.1* 9.6* 9.9*    PLATELETS 10*3/mm3 170 167 164 184     Results from last 7 days   Lab Units 10/09/22  0544 10/08/22  0647 10/07/22  0517 10/06/22  0557   SODIUM mmol/L 140 140 140 140   POTASSIUM mmol/L 4.0 3.1* 3.3* 3.8   CHLORIDE mmol/L 109* 105 104 103   CO2 mmol/L 23.7 24.4 25.4 26.0   BUN mg/dL 14 15 16 19   CREATININE mg/dL 0.81 0.88 0.90 0.84   GLUCOSE mg/dL 77 92 104* 79   EGFR mL/min/1.73 83.7 75.8 73.8 80.2     Results from last 7 days   Lab Units 10/09/22  0544 10/08/22  0647 10/07/22  0517 10/06/22  0557   ALBUMIN g/dL 3.30* 3.30* 3.60 3.60   BILIRUBIN mg/dL 0.3 0.4 0.4 0.3   ALK PHOS U/L 94 97 99 102   AST (SGOT) U/L 28 33* 28 29   ALT (SGPT) U/L 103* 106* 99* 96*     Results from last 7 days   Lab Units 10/09/22  0544 10/08/22  0647 10/07/22  0517 10/06/22  0557 10/05/22  0546   CALCIUM mg/dL 8.5* 8.1* 8.1* 8.4* 8.3*   ALBUMIN g/dL 3.30* 3.30* 3.60 3.60 3.40*   MAGNESIUM mg/dL  --   --   --  2.1 2.0       No results found for: HGBA1C, POCGLU    No radiology results for the last day  Scheduled Medications  buPROPion XL, 150 mg, Oral, Daily  calcitriol, 1 mcg, Oral, Daily  dexamethasone, 4 mg, Oral, Daily With Breakfast  DULoxetine, 30 mg, Oral, Nightly  enoxaparin, 40 mg, Subcutaneous, Q24H  folic acid, 1,000 mcg, Oral, Daily  letrozole, 2.5 mg, Oral, Daily  levothyroxine, 25 mcg, Oral, Q AM  oxyCODONE, 40 mg, Oral, Q12H  pantoprazole, 40 mg, Oral, QAM  senna-docusate sodium, 2 tablet, Oral, BID   And  polyethylene glycol, 17 g, Oral, Daily  potassium & sodium phosphates, 2 packet, Oral, BID AC  sodium chloride, 10 mL, Intravenous, Q12H    Infusions   Diet  Diet Regular; Cardiac       Assessment/Plan     Active Hospital Problems    Diagnosis  POA   • **Cancer related pain [G89.3]  Yes   • Acute cystitis without hematuria [N30.00]  No   • Stage 3a chronic kidney disease (HCC) [N18.31]  Yes   • 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       57 y.o. female admitted with Cancer related pain.       HTN: BP remains soft, amlodipine discontinued.  Hypothyroidism: TSH normal.  Continues levothyroxine.  Thrush: Improved   LFT: Liver ultrasound no acute findings.  Acute hepatitis panel negative.  Metabolic Encephalopathy: 2/2 medications, hospitalization, UTI,  improved. alert, oriented x3.  Falls:  PT / OT  E. coli UTI.    Completed 5-day course of IV ceftriaxone.    · Enoxaparin for DVT prophylaxis.  · CPR  · Discussed with patient & RN.   · Anticipate discharge SNF vs home with HH & family most likely on Wednesday following       KORI Verde  [facility]  10/10/22  16:46 EDT

## 2022-10-10 NOTE — PLAN OF CARE
Goal Outcome Evaluation:              Outcome Evaluation: Pt able to participate in PT this pm.  Assist x2 with ambulation due to pt reported knee buckling and quickly needing to sit.  2nd person present for safety.  Pt will continue to benifit from skilled PT to increase strength and independence w functional mobility.

## 2022-10-10 NOTE — THERAPY TREATMENT NOTE
Patient Name: Holli Patel  : 1965    MRN: [id number]                              Today's Date: 10/10/2022       Admit Date: 2022    Visit Dx:     ICD-10-CM ICD-9-CM   1. 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)  C34.90 162.9    C79.51 198.5     Patient Active Problem List   Diagnosis   • History of gastroesophageal reflux (GERD)   • Primary insomnia   • Mixed hyperlipidemia   • Other specified hypothyroidism   • Arthritis   • Anxiety   • History of migraine   • Essential hypertension   • History of colon polyps   • Diverticulosis   • Leukocytosis   • Personal history of tobacco use   • Osteopenia   • Dyspnea   • Malignant neoplasm of upper-outer quadrant of right breast in female, estrogen receptor positive (HCC)   • Malignant neoplasm of upper-inner quadrant of left breast in female, estrogen receptor positive (HCC)   • Primary adenocarcinoma of upper lobe of left lung (HCC)   • Encounter for fitting and adjustment of vascular catheter   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)   • 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)   • History of bilateral breast cancer   • History of DVT (deep vein thrombosis)   • Stage 3a chronic kidney disease (HCC)   • Confusion   • Chemotherapy-induced neutropenia (HCC)   • Metabolic acidosis   • Ataxia   • Bony metastasis (HCC)   • Hypopotassemia   • Hypophosphatemia   • Hypomagnesemia   • Cancer related pain   • Acute cystitis without hematuria     Past Medical History:   Diagnosis Date   • Anxiety    • Clot     POSSIBLE BLOOD CLOT IN VENOUS ACCESS DEVICE-PT STATES WAS STARTED ON ELIQUIS    • Dyspnea on exertion    • Elevated cholesterol    • Frequent urination     DAY AND NIGHT   • SHAYY (generalized anxiety disorder)     RELATED HIGH BLOOD PRESSURE   • GERD (gastroesophageal reflux disease)    • High blood pressure     ANXIETY RELATED   • Hyperlipidemia    • Hypothyroidism    • Lung cancer (HCC)    • Lung nodule     LEFT    • Malignant neoplasm of upper-outer quadrant of right breast in female, estrogen receptor positive (HCC) 4/13/2021    PT STATES HAS BILAT BREAST CANCER   • Migraine    • PONV (postoperative nausea and vomiting)      Past Surgical History:   Procedure Laterality Date   • BREAST BIOPSY Bilateral 04/07/2021    Right Breast 10 o'clock & Left breast 10 o'clock 6 cm from nipple, [facility]   • CLOSED REDUCTION HIP DISLOCATION Left 4/30/2021    Procedure: BRONCHOSCOPY, LEFT VIDEO ASSITED THORACOSCOPY, ROBOTIC ASSSITED MEDIASTINAL LYMPHADENECTOMY WITH INTERCOSTAL NERVE BLOCKS;  Surgeon: Raphael Kerr III, MD;  Location: [facility];  Service: DaVinci;  Laterality: Left;   • COLONOSCOPY     • TUBAL ABDOMINAL LIGATION     • VENOUS ACCESS DEVICE (PORT) INSERTION Right 5/20/2021    Procedure: INSERTION VENOUS ACCESS DEVICE;  Surgeon: Raphael Kerr III, MD;  Location: [facility];  Service: Thoracic;  Laterality: Right;   • VENOUS ACCESS DEVICE (PORT) INSERTION Right 11/29/2021    Procedure: REVISION AND REPLACEMENT RIGHT SUBCLAVIAN VENOUS ACCESS PORT;  Surgeon: Raphael Kerr III, MD;  Location: [facility];  Service: Thoracic;  Laterality: Right;   • WRIST SURGERY Right       General Information     Row Name 10/10/22 4470          Physical Therapy Time and Intention    Document Type therapy note (daily note)  -MD     Mode of Treatment physical therapy;individual therapy  -MD     Row Name 10/10/22 1071          General Information    Patient Profile Reviewed yes  -MD     Existing Precautions/Restrictions fall  -MD     Row Name 10/10/22 1329          Cognition    Orientation Status (Cognition) oriented x 3  -MD           User Key  (r) = Recorded By, (t) = Taken By, (c) = Cosigned By    Initials Name Provider Type    Gwen Villa, PT Physical Therapist               Mobility     Row Name 10/10/22 1325          Bed Mobility    Supine-Sit [place] (Bed Mobility) verbal cues;standby assist  -MD     Sit-Supine  [place] (Bed Mobility) verbal cues;standby assist  -MD     Assistive Device (Bed Mobility) bed rails;head of bed elevated  -MD     Row Name 10/10/22 1324          Sit-Stand Transfer    Sit-Stand [place] (Transfers) verbal cues;contact guard  -MD     Assistive Device (Sit-Stand Transfers) walker, front-wheeled  -MD     Row Name 10/10/22 1324          Gait/Stairs (Locomotion)    [place] Level (Gait) verbal cues;contact guard;2 person assist  -MD     Assistive Device (Gait) walker, front-wheeled  -MD     Distance in Feet (Gait) 60'  -MD     Deviations/Abnormal Patterns (Gait) yves decreased;gait speed decreased;stride length decreased;festinating/shuffling  -MD     Bilateral Gait Deviations heel strike decreased;forward flexed posture  -MD     Right Sided Gait Deviations knee buckling, right side   pt able to self correct this date  -MD     Comment, (Gait/Stairs) 2nd person required for pt safety as pt has experienced knee buckling in the past  -MD           User Key  (r) = Recorded By, (t) = Taken By, (c) = Cosigned By    Initials Name Provider Type    Gwen Villa, PT Physical Therapist               Obj/Interventions    No documentation.                Goals/Plan     Row Name 10/10/22 1328          Bed Mobility Goal 1 (PT)    Progress/Outcomes (Bed Mobility Goal 1, PT) goal met  -MD     Row Name 10/10/22 1328          Transfer Goal 1 (PT)    Progress/Outcome (Transfer Goal 1, PT) goal ongoing  -MD     Row Name 10/10/22 1328          Gait Training Goal 1 (PT)    Activity/Assistive Device (Gait Training Goal 1, PT) gait (walking locomotion);walker, rolling  -MD     M[place] Level (Gait Training Goal 1, PT) contact guard required  -MD     Distance (Gait Training Goal 1, PT) 150  -MD     Time Frame (Gait Training Goal 1, PT) 2 weeks  -MD     Progress/Outcome (Gait Training Goal 1, PT) goal revised this date  -MD           User Key  (r) = Recorded By, (t) = Taken By, (c) = Cosigned By    Initials  Name Provider Type    Gwen Villa, PT Physical Therapist               Clinical Impression     Row Name 10/10/22 1326          Pain    Pretreatment Pain Rating 0/10 - no pain  -MD     Row Name 10/10/22 1326          Plan of Care Review    Outcome Evaluation Pt able to participate in PT this pm.  Assist x2 with ambulation due to pt reported knee buckling and quickly needing to sit.  2nd person present for safety.  Pt will continue to benifit from skilled PT to increase strength and independence w functional mobility.  -MD     Row Name 10/10/22 1326          Therapy Assessment/Plan (PT)    Therapy Frequency (PT) 3 times/wk  -MD     Row Name 10/10/22 1326          Positioning and Restraints    Pre-Treatment Position in bed  -MD     Post Treatment Position bed  -MD     In Bed supine;call light within reach;exit alarm on;with other staff  sitter present  -MD           User Key  (r) = Recorded By, (t) = Taken By, (c) = Cosigned By    Initials Name Provider Type    Gwen Villa, PT Physical Therapist               Outcome Measures     Row Name 10/10/22 1328          How much help from another person do you currently need...    Turning from your back to your side while in flat bed without using bedrails? 3  -MD     Moving from lying on back to sitting on the side of a flat bed without bedrails? 3  -MD     Moving to and from a bed to a chair (including a wheelchair)? 3  -MD     Standing up from a chair using your arms (e.g., wheelchair, bedside chair)? 3  -MD     Climbing 3-5 steps with a railing? 2  -MD     To walk in hospital room? 2  -MD     AM-PAC 6 Clicks Score (PT) 16  -MD     Highest level of mobility 5 --> Static standing  -MD     Row Name 10/10/22 1328          Functional Assessment    Outcome Measure Options AM-PAC 6 Clicks Basic Mobility (PT)  -MD           User Key  (r) = Recorded By, (t) = Taken By, (c) = Cosigned By    Initials Name Provider Type    Gwen Villa, PT Physical Therapist                              Physical Therapy Education     Title: PT OT SLP Therapies (Done)     Topic: Physical Therapy (Done)     Point: Mobility training (Done)     Learning Progress Summary           Patient Acceptance, E,TB,D, VU,NR by  at 10/7/2022 1615    Acceptance, E, VU,NR by  at 10/6/2022 1452    Acceptance, E, VU by  at 10/5/2022 1931    Acceptance, E,D, VU,NR by  at 10/5/2022 1151    Acceptance, E, VU by HF at 10/4/2022 1944    Acceptance, E, VU by HF at 10/4/2022 1943    Acceptance, E, VU by  at 10/4/2022 1942    Acceptance, E,D, VU,NR by  at 10/3/2022 1147    Acceptance, E,TB,D, VU,NR by  at 10/1/2022 1724    Acceptance, E, VU by  at 9/29/2022 1157    Acceptance, E,D, NR by  at 9/29/2022 0956    Acceptance, E,D, VU,NR by  at 9/28/2022 1457    Acceptance, E, NR by  at 9/26/2022 1133   Family Acceptance, E, VU by HF at 9/29/2022 1157    Acceptance, E, NR by  at 9/26/2022 1133                   Point: Home exercise program (Done)     Learning Progress Summary           Patient Acceptance, E, VU by  at 10/5/2022 1931    Acceptance, E, VU by  at 10/4/2022 1944    Acceptance, E, VU by  at 10/4/2022 1943    Acceptance, E, VU by  at 10/4/2022 1942    Acceptance, E,D, VU,NR by PH at 10/3/2022 1147    Acceptance, E,TB,D, VU,NR by  at 10/1/2022 1724    Acceptance, E, VU by HF at 9/29/2022 1157    Acceptance, E,D, NR by  at 9/29/2022 0956    Acceptance, E,D, VU,NR by PH at 9/28/2022 1457   Family Acceptance, E, VU by HF at 9/29/2022 1157                   Point: Body mechanics (Done)     Learning Progress Summary           Patient Acceptance, E,TB,D, VU,NR by CH at 10/7/2022 1615    Acceptance, E, VU,NR by PH at 10/6/2022 1452    Acceptance, E, VU by HF at 10/5/2022 1931    Acceptance, E,D, VU,NR by PH at 10/5/2022 1151    Acceptance, E, VU by HF at 10/4/2022 1944    Acceptance, E, VU by HF at 10/4/2022 1943    Acceptance, E, VU by HF at 10/4/2022 1942    Acceptance, E,D, VU,NR by PH at 10/3/2022  1147    Acceptance, E,TB,D, VU,NR by  at 10/1/2022 1724    Acceptance, E, VU by HF at 9/29/2022 1157    Acceptance, E,D, NR by PH at 9/29/2022 0956    Acceptance, E,D, VU,NR by PH at 9/28/2022 1457    Acceptance, E, NR by DJ at 9/26/2022 1133   Family Acceptance, E, VU by HF at 9/29/2022 1157    Acceptance, E, NR by DJ at 9/26/2022 1133                   Point: Precautions (Done)     Learning Progress Summary           Patient Acceptance, E, VU by MD at 10/10/2022 1329    Acceptance, E,TB,D, VU,NR by  at 10/7/2022 1615    Acceptance, E, VU,NR by  at 10/6/2022 1452    Acceptance, E, VU by HF at 10/5/2022 1931    Acceptance, E,D, VU,NR by PH at 10/5/2022 1151    Acceptance, E, VU by HF at 10/4/2022 1944    Acceptance, E, VU by HF at 10/4/2022 1943    Acceptance, E, VU by HF at 10/4/2022 1942    Acceptance, E,D, VU,NR by PH at 10/3/2022 1147    Acceptance, E,TB,D, VU,NR by  at 10/1/2022 1724    Acceptance, E, VU by HF at 9/29/2022 1157    Acceptance, E,D, NR by PH at 9/29/2022 0956    Acceptance, E,D, VU,NR by PH at 9/28/2022 1457    Acceptance, E, NR by DJ at 9/26/2022 1133   Family Acceptance, E, VU by HF at 9/29/2022 1157    Acceptance, E, NR by DJ at 9/26/2022 1133                               User Key     Initials Effective Dates Name Provider Type Discipline     06/16/21 -  Aniyah Garzon, PT Physical Therapist PT    MD 06/16/21 -  Gwen Phillips, PT Physical Therapist PT     03/07/18 -  Aura Lomas PTA Physical Therapist Assistant PT    PH 06/16/21 -  [place]ubler, Yamileth, PTA Physical Therapist Assistant PT    DJ 10/25/19 -  Wendy aPez PT Physical Therapist PT    HF 09/22/22 -  Karma Parham, RN Registered Nurse Nurse              PT Recommendation and Plan     Outcome Evaluation: Pt able to participate in PT this pm.  Assist x2 with ambulation due to pt reported knee buckling and quickly needing to sit.  2nd person present for safety.  Pt will continue to benifit from skilled PT  to increase strength and independence w functional mobility.     Time Calculation:    PT Charges     Row Name 10/10/22 1323             Time Calculation    Start Time 1300  -MD      Stop Time 1321  -MD      Time Calculation (min) 21 min  -MD      PT Received On 10/10/22  -MD      PT - Next Appointment 10/12/22  -MD      PT Goal Re-Cert Due Date 10/24/22  -MD            User Key  (r) = Recorded By, (t) = Taken By, (c) = Cosigned By    Initials Name Provider Type    Gwen Villa PT Physical Therapist              Therapy Charges for Today     Code Description Service Date Service Provider Modifiers Qty    [id number]  PT THERAPEUTIC ACT EA 15 MIN 10/10/2022 Gwen Phillips, PT GP 1    [id number] HC PT THER SUPP EA 15 MIN 10/10/2022 Gwen Phillips PT GP 1        Patient was intermittently wearing a face mask during this therapy encounter. Therapist used appropriate personal protective equipment including mask and gloves.  Mask used was standard procedure mask. Appropriate PPE was worn during the entire therapy session. Hand hygiene was completed before and after therapy session. Patient is not in enhanced droplet precautions.       PT G-Codes  Outcome Measure Options: AM-PAC 6 Clicks Basic Mobility (PT)  AM-PAC 6 Clicks Score (PT): 16  AM-PAC 6 Clicks Score (OT): 15    Gwen Phillips PT  10/10/2022

## 2022-10-10 NOTE — PROGRESS NOTES
[facility] GROUP INPATIENT PROGRESS NOTE    Length of Stay:  17 days    CHIEF COMPLAINT/REASON FOR VISIT:  Metastatic non-small cell lung cancer  Cancer-related pain    SUBJECTIVE:    Vital signs remained stable.  She reports that the pain is better controlled on OxyContin 40 mg twice daily.  She is having to use some breakthrough.  She plans to ambulate more today with the help of the walker.  She is requesting a rolling walker.    ROS:  14 systems reviewed with pertinent positives and negatives in the HPI. Reviewed today.    OBJECTIVE:  Vitals:    10/09/22 1634 10/09/22 1937 10/09/22 2300 10/10/22 0300   BP: 108/72 114/82 126/88 108/81   BP Location: Left arm Left arm Left arm Left arm   Patient Position: Lying Lying Lying Lying   Pulse: 116 92 92 86   Resp: 18 18 16 16   Temp: 98.7 °F (37.1 °C) 98.8 °F (37.1 °C) 97.5 °F (36.4 °C) 97 °F (36.1 °C)   TempSrc: Axillary Oral Oral Oral   SpO2: 99% 98% 98% 97%   Weight:       Height:             PHYSICAL EXAMINATION:    General: NAD, alert, answers questions appropriately  HEENT: R scalp and deandre-orbital hematoma with some movement to the left deandre-orbital area, stable to improved again today  Chest/Lungs: CTAB. Right chest mediport in place  Heart: RRR  Abdomen/GI: soft, NT, ND, NABS  Extremities: WWP, no edema or tenderness  I have reexamined the patient and the results are consistent with the previously documented exam. Salma Snell MD       DIAGNOSTIC DATA:  Results Review:     I reviewed the patient's new clinical results.    Results from last 7 days   Lab Units 10/09/22  0544   WBC 10*3/mm3 7.91   HEMOGLOBIN g/dL 8.8*   HEMATOCRIT % 27.8*   PLATELETS 10*3/mm3 170     Lab Results   Component Value Date    NEUTROABS 9.11 (H) 10/08/2022     Results from last 7 days   Lab Units 10/09/22  0544   SODIUM mmol/L 140   POTASSIUM mmol/L 4.0   CHLORIDE mmol/L 109*   CO2 mmol/L 23.7   BUN mg/dL 14   CREATININE mg/dL 0.81   GLUCOSE mg/dL 77   CALCIUM mg/dL 8.5*          Results from last 7 days   Lab Units 10/06/22  0557   MAGNESIUM mg/dL 2.1         IMAGING:    MRI Pelvis With & Without Contrast (09/24/2022 17:26)  CT Head Without Contrast (10/04/2022 07:20)    ASSESSMENT:  This is a 57 y.o. female with:     *Metastatic non-small cell lung cancer  · She was initially diagnosed with stage III (T1N2M0) disease.    · She received initial therapy with cisplatin and Alimta concurrent with radiation therapy beginning 5/25/2021.    · There were indeterminate findings in the lumbar spine at L1 and it was recommended to monitor this over time.    · Patient completed 3 cycles cisplatin and Alimta 7/7/2021 and completed radiation therapy 7/12/2021.    · PET scan 8/6/2021 showed good response to treatment.    · MRI lumbar spine 10/1/2021 with increase in size of the L1 lesion.    · Biopsy of the L1 lesion on 10/14/2022 confirmed metastatic adenocarcinoma of lung origin, PD-L1 TPS 0.    · PET scan 10/26/2021 with involvement of left adrenal and L1/L2 only.    · Patient received radiation therapy to L1/L2 on 11/19/2021.  She also received radiation to the left adrenal gland.    · She initiated further systemic therapy on 11/22/2021 with Keytruda and Alimta.   · Follow-up PET scan 1/18/2022 with decrease in small left upper lobe pulmonary nodule, resolution of activity at L1/L2, no new sites of disease.    · Guardant 360 CT DNA level was monitored and was decreasing.    · PET scan 4/11/2022 with progression in left adrenal and L1.    · Maintained systemic therapy with Keytruda and Alimta and received additional radiation to left adrenal and L1.  Alimta however held since 5/10/2022 due to renal dysfunction.  Radiation completed to lumbar spine 7/14/2022.    · PET scan 7/18/2022 with multiple areas of progression of the spine and right sacrum.    · Guardant 360 analysis 7/20/2022 with no actionable mutations.    · Change in therapy to single agent palliative Taxotere initiated 7/26/2022.     · Altered mental status and febrile neutropenia requiring hospitalization 8/2 - 8/9/2022.  During that admission, MRI brain, cervical, thoracic, lumbar spine performed 8/4/2022 in addition to CT cervical spine 8/5/2022.  There was evidence of C2 metastatic lesion with some dural enhancement, compression deformity at T7 with mild edema suggesting acute to subacute fracture, 5 mm T12 spinous process metastasis, multifocal disease in the lumbar spine and sacrum, largest involving sacral ala to the right 5 cm in transverse dimension.  Loss of height at L2 25%.  · Patient received cycle 3 Taxotere 9/6/2022 with Neulasta support.    · She has been continuing as well on Xgeva every 4 weeks (last received 9/6/2022).  · Patient did undergo MRI lumbar spine and pelvis on 9/24/2022.  MRI pelvis showed bilateral sacral fractures with marrow infiltration related to metastasis more prominent on the right.  Lesion on the right 3.5 x 4.1 x 4.3 cm.  Also probable metastasis along posterior acetabulum on the right 1.5 cm.  MRI lumbar spine with stable L1 metastasis, new edema endplate T12 possibly stress reaction versus developing new metastasis, lesion at T12 spinous process unchanged.   · Radiation oncology consulted with plans for palliative treatment of the right sacral metastasis.  Treatment initiated 9/28/2022  · She will complete radiation to the sacral metastasis on 10/12/2022.  · Following discharge she will undergo a PET/CT and depending upon the PET may resume Taxotere.     *History of bilateral stage I breast cancer  · Patient with bilateral breast cancer, both stage I ([id number]), grade 2, ER/IL positive and HER2/melinda negative with low Ki-67.    · Patient initiated adjuvant letrozole in May 2021.  · Continue letrozole daily     *Cancer related pain  · Patient has been receiving Duragesic patch 50 mcg every 72 hours in addition to oxycodone 15 mg every 4 hours for breakthrough pain.  Duragesic dose had been escalated  recently in the outpatient setting due to worsening pain  · On admission 9/23/2022, Duragesic patch increased to 75 mcg daily, added Decadron 4 mg IV every 6 hours with continuation of oxycodone 15 mg every 4 hours as needed for breakthrough pain in addition of Dilaudid 1 mg every 2 hours as needed for breakthrough pain.  · Patient with increasing side effects from narcotics, Duragesic decreased on 9/25/2022 from 75 down to 50 mcg patch every 72 hours.  · Dexamethasone dose decreased to 4 mg p.o. every 12 hours on 9/27/2022  · Initiated palliative radiation to the right sacrum on 9/28/2022  · Patient became confused after receiving Dilaudid.  Patient and family asked to discontinue Dilaudid.  · Duragesic patch dose was increased to 75 mcg/h on 9/30/2022. Subsequent decrease to 50 mcg due to confusion  · Gabapentin was discontinued due to the sedating effect.  · Oxycodone is being used for breakthrough pain.  · 10/3 transition to sustained release oxycodone 40 mg bid and fentanyl patch stopped  · Pain well controlled on current regimen     *Anxiety/depression  · Patient was previously on Wellbutrin  mg daily, Lexapro 10 mg daily.  · On 9/30/2022, she was switched from Lexapro to Cymbalta 30 mg daily.  · Atarax initiated for restlessness, improved     *Insomnia  · Patient was previously on Xanax but reported that it was not working.  · Patient previously tried melatonin and it was not effective.  · She was placed on Restoril 15 mg nightly on 10/1/2022.  · The patient had 3 falls since last night.  · Its not clear if this is due to Restoril but since this happened after the change, we switched back to Xanax-Home dose of 0.5 mg as needed for sleep.  · Well-controlled on statin     *Anemia  · Most recent evaluation 8/3/2022 with iron 15, ferritin 276, iron saturation 5%, TIBC 328, folate greater than 20, B12 392  · Anemia felt to be related to malignancy/chronic disease and chemotherapy  · Hemoglobin has remained  in the 8-10 range  · Hemoglobin 8.8 yesterday, no labs today.     *Peripheral neuropathy  · Patient was on gabapentin 300 mg twice daily.  · Gabapentin was discontinued on 9/30/2022.  · She is currently on Cymbalta.  Continue the same.     *Narcotic induced constipation  · Patient is on Sybil-Colace 2 tablets twice daily with MiraLAX daily   · Having regular bowel movement     *GI prophylaxis  · Protonix 40 mg daily     *DVT prophylaxis  · Lovenox 40 mg daily     *Elevated liver labs  · Atorvastatin was discontinued  · LFTs stable as of 10/9/2022  · No labs today    *Delirium  · Resolved    *BLE calf pain  · BLE venous duplex negative. Exam reassurring    *Fall with right scalp hematoma    *Deconditioning  · PT following. ? Subacute rehabilitation    *UTI  · Symptomatic   · Ucx with E coli, pansensitive  · Continue Rocephin    RECOMMENDATIONS:  1. Continue radiation, 10 fractions due to be complete this week on Tuesday  2. Continue sustained release oxycydone 40 mg q12h, added on 10/3 and fentanyl patch stopped.  Improved pain.  3. Continue as needed oxygen  4. Cymbalta at 30 mg daily for neuropathy  5. Continue dexamethasone at 4 mg in the morning, will taper as output  6. Xanax qhs prn, for insomnia  7. On Rocephin for UTI  8. Subacute rehabilitation versus home if she improves enough prior to discharge.  Outpatient PET scan then outpatient follow-up.          Salma Snell MD

## 2022-10-11 ENCOUNTER — RADIATION ONCOLOGY WEEKLY ASSESSMENT (OUTPATIENT)
Dept: RADIATION ONCOLOGY | Facility: HOSPITAL | Age: 57
End: 2022-10-11

## 2022-10-11 ENCOUNTER — TREATMENT (OUTPATIENT)
Dept: RADIATION ONCOLOGY | Facility: HOSPITAL | Age: 57
End: 2022-10-11

## 2022-10-11 DIAGNOSIS — C79.51 BONY METASTASIS: Primary | ICD-10-CM

## 2022-10-11 LAB
ALBUMIN SERPL-MCNC: 3.5 G/DL (ref 3.5–5.2)
ALBUMIN/GLOB SERPL: 1.9 G/DL
ALP SERPL-CCNC: 96 U/L (ref 39–117)
ALT SERPL W P-5'-P-CCNC: 89 U/L (ref 1–33)
ANION GAP SERPL CALCULATED.3IONS-SCNC: 11 MMOL/L (ref 5–15)
AST SERPL-CCNC: 26 U/L (ref 1–32)
BILIRUB SERPL-MCNC: 0.2 MG/DL (ref 0–1.2)
BUN SERPL-MCNC: 13 MG/DL (ref 6–20)
BUN/CREAT SERPL: 15.9 (ref 7–25)
CALCIUM SPEC-SCNC: 8.2 MG/DL (ref 8.6–10.5)
CHLORIDE SERPL-SCNC: 106 MMOL/L (ref 98–107)
CO2 SERPL-SCNC: 24 MMOL/L (ref 22–29)
CREAT SERPL-MCNC: 0.82 MG/DL (ref 0.57–1)
DEPRECATED RDW RBC AUTO: 57.5 FL (ref 37–54)
EGFRCR SERPLBLD CKD-EPI 2021: 82.5 ML/MIN/1.73
ERYTHROCYTE [DISTWIDTH] IN BLOOD BY AUTOMATED COUNT: 18.1 % (ref 12.3–15.4)
GLOBULIN UR ELPH-MCNC: 1.8 GM/DL
GLUCOSE SERPL-MCNC: 70 MG/DL (ref 65–99)
HCT VFR BLD AUTO: 27.2 % (ref 34–46.6)
HGB BLD-MCNC: 8.9 G/DL (ref 12–15.9)
MCH RBC QN AUTO: 29.2 PG (ref 26.6–33)
MCHC RBC AUTO-ENTMCNC: 32.7 G/DL (ref 31.5–35.7)
MCV RBC AUTO: 89.2 FL (ref 79–97)
PLATELET # BLD AUTO: 166 10*3/MM3 (ref 140–450)
PMV BLD AUTO: 10.3 FL (ref 6–12)
POTASSIUM SERPL-SCNC: 3.7 MMOL/L (ref 3.5–5.2)
PROT SERPL-MCNC: 5.3 G/DL (ref 6–8.5)
RAD ONC ARIA COURSE ID: NORMAL
RAD ONC ARIA COURSE INTENT: NORMAL
RAD ONC ARIA COURSE LAST TREATMENT DATE: NORMAL
RAD ONC ARIA COURSE START DATE: NORMAL
RAD ONC ARIA COURSE TREATMENT ELAPSED DAYS: 13
RAD ONC ARIA FIRST TREATMENT DATE: NORMAL
RAD ONC ARIA PLAN FRACTIONS TREATED TO DATE: 10
RAD ONC ARIA PLAN ID: NORMAL
RAD ONC ARIA PLAN PRESCRIBED DOSE PER FRACTION: 3 GY
RAD ONC ARIA PLAN PRIMARY REFERENCE POINT: NORMAL
RAD ONC ARIA PLAN TOTAL FRACTIONS PRESCRIBED: 10
RAD ONC ARIA PLAN TOTAL PRESCRIBED DOSE: 3000 CGY
RAD ONC ARIA REFERENCE POINT DOSAGE GIVEN TO DATE: 30 GY
RAD ONC ARIA REFERENCE POINT DOSAGE GIVEN TO DATE: 30.28 GY
RAD ONC ARIA REFERENCE POINT ID: NORMAL
RAD ONC ARIA REFERENCE POINT ID: NORMAL
RAD ONC ARIA REFERENCE POINT SESSION DOSAGE GIVEN: 3 GY
RAD ONC ARIA REFERENCE POINT SESSION DOSAGE GIVEN: 3.03 GY
RBC # BLD AUTO: 3.05 10*6/MM3 (ref 3.77–5.28)
SODIUM SERPL-SCNC: 141 MMOL/L (ref 136–145)
WBC NRBC COR # BLD: 7.81 10*3/MM3 (ref 3.4–10.8)

## 2022-10-11 PROCEDURE — 85027 COMPLETE CBC AUTOMATED: CPT | Performed by: STUDENT IN AN ORGANIZED HEALTH CARE EDUCATION/TRAINING PROGRAM

## 2022-10-11 PROCEDURE — 77386: CPT | Performed by: RADIOLOGY

## 2022-10-11 PROCEDURE — 25010000002 ENOXAPARIN PER 10 MG: Performed by: INTERNAL MEDICINE

## 2022-10-11 PROCEDURE — 80053 COMPREHEN METABOLIC PANEL: CPT | Performed by: STUDENT IN AN ORGANIZED HEALTH CARE EDUCATION/TRAINING PROGRAM

## 2022-10-11 PROCEDURE — 63710000001 DEXAMETHASONE PER 0.25 MG: Performed by: INTERNAL MEDICINE

## 2022-10-11 PROCEDURE — 99232 SBSQ HOSP IP/OBS MODERATE 35: CPT | Performed by: INTERNAL MEDICINE

## 2022-10-11 RX ORDER — OXYCODONE 36 MG/1
36 CAPSULE, EXTENDED RELEASE ORAL EVERY 12 HOURS
Qty: 60 EACH | Refills: 0 | Status: SHIPPED | OUTPATIENT
Start: 2022-10-11 | End: 2022-11-07 | Stop reason: SDUPTHER

## 2022-10-11 RX ADMIN — PANTOPRAZOLE SODIUM 40 MG: 40 TABLET, DELAYED RELEASE ORAL at 06:17

## 2022-10-11 RX ADMIN — Medication 10 ML: at 20:25

## 2022-10-11 RX ADMIN — OXYCODONE HYDROCHLORIDE 15 MG: 15 TABLET ORAL at 09:56

## 2022-10-11 RX ADMIN — Medication 1000 MCG: at 09:46

## 2022-10-11 RX ADMIN — ALPRAZOLAM 0.5 MG: 0.5 TABLET ORAL at 20:31

## 2022-10-11 RX ADMIN — OXYCODONE HYDROCHLORIDE 40 MG: 15 TABLET, FILM COATED, EXTENDED RELEASE ORAL at 20:23

## 2022-10-11 RX ADMIN — Medication 2 PACKET: at 20:24

## 2022-10-11 RX ADMIN — HYDROXYZINE HYDROCHLORIDE 25 MG: 25 TABLET ORAL at 04:58

## 2022-10-11 RX ADMIN — OXYCODONE HYDROCHLORIDE 15 MG: 15 TABLET ORAL at 15:37

## 2022-10-11 RX ADMIN — DULOXETINE HYDROCHLORIDE 30 MG: 30 CAPSULE, DELAYED RELEASE ORAL at 20:23

## 2022-10-11 RX ADMIN — LEVOTHYROXINE SODIUM 25 MCG: 0.03 TABLET ORAL at 06:17

## 2022-10-11 RX ADMIN — BUPROPION HYDROCHLORIDE 150 MG: 150 TABLET, EXTENDED RELEASE ORAL at 09:46

## 2022-10-11 RX ADMIN — OXYCODONE HYDROCHLORIDE 15 MG: 15 TABLET ORAL at 19:38

## 2022-10-11 RX ADMIN — CALCITRIOL 1 MCG: 0.25 CAPSULE ORAL at 09:45

## 2022-10-11 RX ADMIN — OXYCODONE HYDROCHLORIDE 40 MG: 15 TABLET, FILM COATED, EXTENDED RELEASE ORAL at 09:56

## 2022-10-11 RX ADMIN — OXYCODONE HYDROCHLORIDE 15 MG: 15 TABLET ORAL at 04:58

## 2022-10-11 RX ADMIN — ENOXAPARIN SODIUM 40 MG: 100 INJECTION SUBCUTANEOUS at 15:32

## 2022-10-11 RX ADMIN — Medication 10 ML: at 09:57

## 2022-10-11 RX ADMIN — Medication 2 PACKET: at 09:53

## 2022-10-11 RX ADMIN — DEXAMETHASONE 4 MG: 4 TABLET ORAL at 09:46

## 2022-10-11 RX ADMIN — LETROZOLE 2.5 MG: 2.5 TABLET, FILM COATED ORAL at 09:44

## 2022-10-11 NOTE — PROGRESS NOTES
Name: Holli Patel ADMIT: 2022   : 1965  PCP: Vandana Gastelum MD    MRN: [id number] LOS: 18 days   AGE/SEX: 57 y.o. female  ROOM: [facility]     Subjective   Subjective   Patient appears generally weak, relatively comfortable, & in no apparent distress.  Tolerating diet & PT with assistance.   & aide at bedside.    Review of Systems   Constitutional: Negative for chills and fever.   Respiratory: Negative for cough and shortness of breath.    Cardiovascular: Negative for chest pain and leg swelling.   Gastrointestinal: Negative for abdominal pain, constipation, diarrhea, nausea and vomiting.   Genitourinary: Negative for difficulty urinating and dysuria.   Musculoskeletal: Positive for gait problem (due to generalized weakness). Negative for myalgias.        Objective   Objective   Vital Signs  Temp:  [97.1 °F (36.2 °C)-99.6 °F (37.6 °C)] 97.5 °F (36.4 °C)  Heart Rate:  [86-99] 98  Resp:  [18] 18  BP: ()/(67-87) 99/67  SpO2:  [96 %-98 %] 98 %  on   ;   Device (Oxygen Therapy): room air  Body mass index is 25.54 kg/m².     Physical Exam  Constitutional:       General: She is not in acute distress.     Appearance: She is obese. She is not toxic-appearing.      Comments: Generally weak   Eyes:      Comments: ecchymosis right eye   Cardiovascular:      Rate and Rhythm: Normal rate.      Heart sounds: Normal heart sounds.   Pulmonary:      Effort: Pulmonary effort is normal.      Breath sounds: Normal breath sounds.   Abdominal:      General: Bowel sounds are normal.      Palpations: Abdomen is soft.   Musculoskeletal:      Right lower leg: No edema.      Left lower leg: No edema.   Skin:     General: Skin is warm and dry.   Neurological:      Mental Status: She is alert.       Results Review     I reviewed the patient's new clinical results.  Results from last 7 days   Lab Units 10/11/22  0519 10/09/22  0544 10/08/22  0646 10/07/22  0517   WBC 10*3/mm3 7.81 7.91 10.55 11.08*   HEMOGLOBIN  g/dL 8.9* 8.8* 9.1* 9.6*   PLATELETS 10*3/mm3 166 170 167 164     Results from last 7 days   Lab Units 10/11/22  0519 10/09/22  0544 10/08/22  0647 10/07/22  0517   SODIUM mmol/L 141 140 140 140   POTASSIUM mmol/L 3.7 4.0 3.1* 3.3*   CHLORIDE mmol/L 106 109* 105 104   CO2 mmol/L 24.0 23.7 24.4 25.4   BUN mg/dL 13 14 15 16   CREATININE mg/dL 0.82 0.81 0.88 0.90   GLUCOSE mg/dL 70 77 92 104*   EGFR mL/min/1.73 82.5 83.7 75.8 73.8     Results from last 7 days   Lab Units 10/11/22  0519 10/09/22  0544 10/08/22  0647 10/07/22  0517   ALBUMIN g/dL 3.50 3.30* 3.30* 3.60   BILIRUBIN mg/dL 0.2 0.3 0.4 0.4   ALK PHOS U/L 96 94 97 99   AST (SGOT) U/L 26 28 33* 28   ALT (SGPT) U/L 89* 103* 106* 99*     Results from last 7 days   Lab Units 10/11/22  0519 10/09/22  0544 10/08/22  0647 10/07/22  0517 10/06/22  0557 10/05/22  0546   CALCIUM mg/dL 8.2* 8.5* 8.1* 8.1* 8.4* 8.3*   ALBUMIN g/dL 3.50 3.30* 3.30* 3.60 3.60 3.40*   MAGNESIUM mg/dL  --   --   --   --  2.1 2.0       No results found for: HGBA1C, POCGLU    No radiology results for the last day  Scheduled Medications  buPROPion XL, 150 mg, Oral, Daily  calcitriol, 1 mcg, Oral, Daily  dexamethasone, 4 mg, Oral, Daily With Breakfast  DULoxetine, 30 mg, Oral, Nightly  enoxaparin, 40 mg, Subcutaneous, Q24H  folic acid, 1,000 mcg, Oral, Daily  letrozole, 2.5 mg, Oral, Daily  levothyroxine, 25 mcg, Oral, Q AM  oxyCODONE, 40 mg, Oral, Q12H  pantoprazole, 40 mg, Oral, QAM  senna-docusate sodium, 2 tablet, Oral, BID   And  polyethylene glycol, 17 g, Oral, Daily  potassium & sodium phosphates, 2 packet, Oral, BID AC  sodium chloride, 10 mL, Intravenous, Q12H    Infusions   Diet  Diet Regular; Cardiac       Assessment/Plan     Active Hospital Problems    Diagnosis  POA   • **Cancer related pain [G89.3]  Yes   • Acute cystitis without hematuria [N30.00]  No   • Stage 3a chronic kidney disease (HCC) [N18.31]  Yes   • 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       57 y.o. female admitted with Cancer related pain.       HTN: BP remains soft, amlodipine discontinued.  Hypothyroidism: TSH normal.  Continues levothyroxine.  Thrush: Improved   LFT: Liver ultrasound no acute findings.  Acute hepatitis panel negative.  Metabolic Encephalopathy: resolved & 2/2 medications, hospitalization, UTI.  alert, oriented x3.  Falls:  PT / OT  E. coli UTI.    Completed 5-day course of IV ceftriaxone.  Cancer-related pain:  Completed fx 10/10 right sacrum 3000/3000cgy on 10/11/0222.  Plan Oxycontin switch to Xtampza per insurance request on 10/11/2022.  Bowel regimen effective.     · Enoxaparin for DVT prophylaxis.  · CPR  · Discussed with patient & RN.   · Anticipate discharge SNF (family & patient now request rehab) vs home with HH / CCP following      KORI Verde  [facility]  10/11/22  15:10 EDT

## 2022-10-11 NOTE — PROGRESS NOTES
Physician Weekly Management Note    Diagnosis:     Diagnosis Plan   1. Bony metastasis (HCC)            RT Details:  fx 10/10 right sacrum 3000/3000cgy    Notes on Treatment course, Films, Medical progress:  Doing ok, kps 70%, still inpatient, pain a little better in right sacrum, completes today, no follow up , has regular follow up with med on c    Weekly Management:  Medication reviewed?   Yes  New medications given?   No  Problemlist reviewed?   Yes  Problem added?   No  Issues raised requiring referral to support services - task assigned to:  na    Technical aspects reviewed:  Weekly OBI approved?   Yes  Weekly port films approved?   Yes  Change requests noted on port film?   No  Patient setup and plan reviewed?   Yes    Chart Reviewed:  Continue current treatment plan?   Yes  Treatment plan change requested?   No  CBC reviewed?   Yes  Concurrent Chemo?   No    Objective     Toxicities:   fatigue     Review of Systems   Constitutional: Positive for fatigue.   Musculoskeletal: Positive for arthralgias, back pain and gait problem.          There were no vitals filed for this visit.    Current Status 9/13/2022   ECOG score 1       Physical Exam  Neurological:      Mental Status: She is alert.   Psychiatric:         Mood and Affect: Mood normal.             Problem Summary List    Diagnosis:     Diagnosis Plan   1. Bony metastasis (HCC)          Pathology:   Non small cell lung cancer bone mets    Past Medical History:   Diagnosis Date   • Anxiety    • Clot     POSSIBLE BLOOD CLOT IN VENOUS ACCESS DEVICE-PT STATES WAS STARTED ON ELIQUIS    • Dyspnea on exertion    • Elevated cholesterol    • Frequent urination     DAY AND NIGHT   • SHAYY (generalized anxiety disorder)     RELATED HIGH BLOOD PRESSURE   • GERD (gastroesophageal reflux disease)    • High blood pressure     ANXIETY RELATED   • Hyperlipidemia    • Hypothyroidism    • Lung cancer (HCC)    • Lung nodule     LEFT   • Malignant neoplasm of upper-outer  quadrant of right breast in female, estrogen receptor positive (HCC) 4/13/2021    PT STATES HAS BILAT BREAST CANCER   • Migraine    • PONV (postoperative nausea and vomiting)          Past Surgical History:   Procedure Laterality Date   • BREAST BIOPSY Bilateral 04/07/2021    Right Breast 10 o'clock & Left breast 10 o'clock 6 cm from nipple, [facility]   • CLOSED REDUCTION HIP DISLOCATION Left 4/30/2021    Procedure: BRONCHOSCOPY, LEFT VIDEO ASSITED THORACOSCOPY, ROBOTIC ASSSITED MEDIASTINAL LYMPHADENECTOMY WITH INTERCOSTAL NERVE BLOCKS;  Surgeon: Raphael Kerr III, MD;  Location: [facility];  Service: DaVinci;  Laterality: Left;   • COLONOSCOPY     • TUBAL ABDOMINAL LIGATION     • VENOUS ACCESS DEVICE (PORT) INSERTION Right 5/20/2021    Procedure: INSERTION VENOUS ACCESS DEVICE;  Surgeon: Raphael Kerr III, MD;  Location: [facility] OR;  Service: Thoracic;  Laterality: Right;   • VENOUS ACCESS DEVICE (PORT) INSERTION Right 11/29/2021    Procedure: REVISION AND REPLACEMENT RIGHT SUBCLAVIAN VENOUS ACCESS PORT;  Surgeon: Raphael Kerr III, MD;  Location: [facility] OR;  Service: Thoracic;  Laterality: Right;   • WRIST SURGERY Right          Current Facility-Administered Medications on File Prior to Visit   Medication Dose Route Frequency Provider Last Rate Last Admin   • acetaminophen (TYLENOL) tablet 650 mg  650 mg Oral Q4H PRN Kerrie Last MD   650 mg at 10/09/22 1845    Or   • acetaminophen (TYLENOL) 160 MG/5ML solution 650 mg  650 mg Oral Q4H PRN Kerrie Last MD        Or   • acetaminophen (TYLENOL) suppository 650 mg  650 mg Rectal Q4H PRN Kerrie Last MD       • ALPRAZolam (XANAX) tablet 0.5 mg  0.5 mg Oral Nightly PRN Demetrice Bhatt MD   0.5 mg at 10/10/22 2101   • sennosides-docusate (PERICOLACE) 8.6-50 MG per tablet 2 tablet  2 tablet Oral BID Jelani Tran Jr., MD   2 tablet at 10/10/22 0918    And   • polyethylene glycol (MIRALAX) packet 17 g  17 g Oral Daily  Jelani Tran Jr., MD   17 g at 10/10/22 0918    And   • bisacodyl (DULCOLAX) EC tablet 5 mg  5 mg Oral Daily PRN Jelani Tran Jr., MD        And   • bisacodyl (DULCOLAX) suppository 10 mg  10 mg Rectal Daily PRN Jelani Tran Jr., MD       • buPROPion XL (WELLBUTRIN XL) 24 hr tablet 150 mg  150 mg Oral Daily Kerrie Last MD   150 mg at 10/10/22 0918   • calcitriol (ROCALTROL) capsule 1 mcg  1 mcg Oral Daily Kerrie Last MD   1 mcg at 10/10/22 0918   • dexamethasone (DECADRON) tablet 4 mg  4 mg Oral Daily With Breakfast Demetrice Bhatt MD   4 mg at 10/10/22 0918   • DULoxetine (CYMBALTA) DR capsule 30 mg  30 mg Oral Nightly Demetrice Bhatt MD   30 mg at 10/10/22 2101   • Enoxaparin Sodium (LOVENOX) syringe 40 mg  40 mg Subcutaneous Q24H Jelani Tran Jr., MD   40 mg at 10/10/22 1623   • folic acid (FOLVITE) tablet 1,000 mcg  1,000 mcg Oral Daily Kerrie Last MD   1,000 mcg at 10/10/22 0918   • heparin injection 500 Units  5 mL Intravenous PRN Ulises Wooten MD       • hydrALAZINE (APRESOLINE) tablet 10 mg  10 mg Oral Q6H PRN Harley Sheikh DO       • hydrOXYzine (ATARAX) tablet 25 mg  25 mg Oral TID PRN Blaine Mora MD   25 mg at 10/11/22 0458   • letrozole (FEMARA) tablet 2.5 mg  2.5 mg Oral Daily Kerrie Last MD   2.5 mg at 10/10/22 0918   • levothyroxine (SYNTHROID, LEVOTHROID) tablet 25 mcg  25 mcg Oral Q AM Kerrie Last MD   25 mcg at 10/11/22 0617   • magnesium sulfate 4 gram infusion - Mg less than or equal to 1mg/dL  4 g Intravenous PRN Blaine Mora MD        Or   • magnesium sulfate 3 gram infusion (1gm x 3) - Mg 1.1 - 1.5 mg/dL  1 g Intravenous PRN Blaine Mora MD        Or   • Magnesium Sulfate 2 gram infusion- Mg 1.6 - 1.9 mg/dL  2 g Intravenous PRN Blaine Mora MD       • ondansetron (ZOFRAN) injection 4 mg  4 mg Intravenous Q6H PRN Kerrie Last MD       • oxyCODONE (oxyCONTIN) 12 hr tablet 40 mg   40 mg Oral Q12H Eddie Cardenas MD   40 mg at 10/10/22 2101   • oxyCODONE (ROXICODONE) immediate release tablet 15 mg  15 mg Oral Q4H PRN Kerrie Last MD   15 mg at 10/11/22 0458   • pantoprazole (PROTONIX) EC tablet 40 mg  40 mg Oral QAM Kerrie Last MD   40 mg at 10/11/22 0617   • potassium & sodium phosphates (PHOS-NAK) oral packet  2 packet Oral BID AC Kerrie Last MD   2 packet at 10/10/22 1630   • prochlorperazine (COMPAZINE) tablet 10 mg  10 mg Oral Q6H PRN Kerrie Last MD       • sodium chloride 0.9 % flush 10 mL  10 mL Intravenous Q12H Ulises Wooten MD   10 mL at 10/10/22 2104   • sodium chloride 0.9 % flush 10 mL  10 mL Intravenous PRN Ulises Wooten MD       • sodium chloride 0.9 % flush 20 mL  20 mL Intravenous PRN Ulises Wooten MD         Current Outpatient Medications on File Prior to Visit   Medication Sig Dispense Refill   • ALPRAZolam (XANAX) 0.5 MG tablet TAKE ONE TABLET BY MOUTH TWICE A DAY AS NEEDED FOR ANXIETY 60 tablet 1   • buPROPion XL (WELLBUTRIN XL) 150 MG 24 hr tablet Take 1 tablet by mouth Daily. 30 tablet 5   • calcitriol (ROCALTROL) 0.5 MCG capsule TAKE TWO CAPSULES BY MOUTH DAILY 60 capsule 0   • dexamethasone (DECADRON) 4 MG tablet Take 1 tablet by mouth Daily With Breakfast. 30 tablet 1   • escitalopram (Lexapro) 10 MG tablet Take 1 tablet by mouth Daily. 30 tablet 2   • fentaNYL (DURAGESIC) 50 MCG/HR patch Place 1 patch on the skin as directed by provider Every 72 (Seventy-Two) Hours for 30 days. 1 patch 10 patch 0   • folic acid (FOLVITE) 1 MG tablet TAKE ONE TABLET BY MOUTH DAILY 30 tablet 3   • gabapentin (NEURONTIN) 300 MG capsule Take 1 capsule by mouth 3 (Three) Times a Day. TAKE ONE CAPSULES BY MOUTH THREE TIMES A DAY (Patient taking differently: Take 300 mg by mouth 3 (Three) Times a Day. TAKE ONE CAPSULES BY MOUTH THREE TIMES A DAY    Patient taking differently. States was changed to 300mg twice a day) 180 capsule 3   •  letrozole (FEMARA) 2.5 MG tablet Take 1 tablet by mouth Daily. 90 tablet 3   • levothyroxine (SYNTHROID, LEVOTHROID) 25 MCG tablet Take 1 tablet by mouth Daily. (Patient taking differently: Take 25 mcg by mouth Daily. Patient states she no longer takes this medication) 90 tablet 1   • omeprazole (PrilOSEC) 20 MG capsule Take 1 capsule by mouth 2 (Two) Times a Day. 180 capsule 1   • oxyCODONE (ROXICODONE) 15 MG immediate release tablet Take 1 tablet by mouth Every 4 (Four) Hours As Needed for Moderate Pain. 180 tablet 0   • potassium & sodium phosphates (Phos-NaK) 280-160-250 MG pack packet Take 2 packets by mouth 2 (Two) Times a Day Before Meals. 120 packet 1   • prochlorperazine (COMPAZINE) 10 MG tablet Take 1 tablet by mouth Every 6 (Six) Hours As Needed for Nausea or Vomiting. 30 tablet 1   • rizatriptan (MAXALT) 10 MG tablet Take 1 tablet by mouth 1 (One) Time for 1 dose. AT ONSET OF HEADACHE MAY REPEAT ONE TABLET IN 2 HOURS IF NEEDED 12 tablet 5   • simvastatin (ZOCOR) 20 MG tablet Take 1 tablet by mouth Daily. 90 tablet 1       No Known Allergies      Referring Provider:    No referring provider defined for this encounter.    Oncologist:  No primary care provider on file.      Seen and approved by:  Patty Franklin MD  10/11/2022  Subjective     No ref. provider found

## 2022-10-11 NOTE — PROGRESS NOTES
[facility] GROUP INPATIENT PROGRESS NOTE    Length of Stay:  18 days    CHIEF COMPLAINT/REASON FOR VISIT:  Metastatic non-small cell lung cancer  Cancer-related pain    SUBJECTIVE:    Vital signs remained stable.  Pain adequately controlled.  She only complains of pain when she sits on the bedside commode.  Today is the last day of radiation.    ROS:  14 systems reviewed with pertinent positives and negatives in the HPI. Reviewed today.    OBJECTIVE:  Vitals:    10/10/22 2000 10/10/22 2300 10/11/22 0300 10/11/22 0700   BP:  119/87 100/71 123/87   BP Location:  Left arm Left arm Right arm   Patient Position:  Lying Lying Lying   Pulse:  92 86 87   Resp:  18 18 18   Temp: 98.6 °F (37 °C) 98.6 °F (37 °C) 97.5 °F (36.4 °C) 97.1 °F (36.2 °C)   TempSrc: Oral Oral Oral Oral   SpO2:  96% 98%    Weight:       Height:             PHYSICAL EXAMINATION:    General: NAD, alert, answers questions appropriately  HEENT: R scalp and deandre-orbital hematoma with some movement to the left deandre-orbital area, stable to improved again today  Chest/Lungs: CTAB. Right chest mediport in place  Heart: RRR  Abdomen/GI: soft, NT, ND, NABS  Extremities: WWP, no edema or tenderness    I have reexamined the patient and the results are consistent with the previously documented exam. Salma Snell MD       DIAGNOSTIC DATA:  Results Review:     I reviewed the patient's new clinical results.    Results from last 7 days   Lab Units 10/11/22  0519   WBC 10*3/mm3 7.81   HEMOGLOBIN g/dL 8.9*   HEMATOCRIT % 27.2*   PLATELETS 10*3/mm3 166     Lab Results   Component Value Date    NEUTROABS 9.11 (H) 10/08/2022     Results from last 7 days   Lab Units 10/11/22  0519   SODIUM mmol/L 141   POTASSIUM mmol/L 3.7   CHLORIDE mmol/L 106   CO2 mmol/L 24.0   BUN mg/dL 13   CREATININE mg/dL 0.82   GLUCOSE mg/dL 70   CALCIUM mg/dL 8.2*         Results from last 7 days   Lab Units 10/06/22  0557   MAGNESIUM mg/dL 2.1         IMAGING:    MRI Pelvis With &  Without Contrast (09/24/2022 17:26)  CT Head Without Contrast (10/04/2022 07:20)    ASSESSMENT:  This is a 57 y.o. female with:     *Metastatic non-small cell lung cancer  · She was initially diagnosed with stage III (T1N2M0) disease.    · She received initial therapy with cisplatin and Alimta concurrent with radiation therapy beginning 5/25/2021.    · There were indeterminate findings in the lumbar spine at L1 and it was recommended to monitor this over time.    · Patient completed 3 cycles cisplatin and Alimta 7/7/2021 and completed radiation therapy 7/12/2021.    · PET scan 8/6/2021 showed good response to treatment.    · MRI lumbar spine 10/1/2021 with increase in size of the L1 lesion.    · Biopsy of the L1 lesion on 10/14/2022 confirmed metastatic adenocarcinoma of lung origin, PD-L1 TPS 0.    · PET scan 10/26/2021 with involvement of left adrenal and L1/L2 only.    · Patient received radiation therapy to L1/L2 on 11/19/2021.  She also received radiation to the left adrenal gland.    · She initiated further systemic therapy on 11/22/2021 with Keytruda and Alimta.   · Follow-up PET scan 1/18/2022 with decrease in small left upper lobe pulmonary nodule, resolution of activity at L1/L2, no new sites of disease.    · Guardant 360 CT DNA level was monitored and was decreasing.    · PET scan 4/11/2022 with progression in left adrenal and L1.    · Maintained systemic therapy with Keytruda and Alimta and received additional radiation to left adrenal and L1.  Alimta however held since 5/10/2022 due to renal dysfunction.  Radiation completed to lumbar spine 7/14/2022.    · PET scan 7/18/2022 with multiple areas of progression of the spine and right sacrum.    · Guardant 360 analysis 7/20/2022 with no actionable mutations.    · Change in therapy to single agent palliative Taxotere initiated 7/26/2022.    · Altered mental status and febrile neutropenia requiring hospitalization 8/2 - 8/9/2022.  During that admission, MRI  brain, cervical, thoracic, lumbar spine performed 8/4/2022 in addition to CT cervical spine 8/5/2022.  There was evidence of C2 metastatic lesion with some dural enhancement, compression deformity at T7 with mild edema suggesting acute to subacute fracture, 5 mm T12 spinous process metastasis, multifocal disease in the lumbar spine and sacrum, largest involving sacral ala to the right 5 cm in transverse dimension.  Loss of height at L2 25%.  · Patient received cycle 3 Taxotere 9/6/2022 with Neulasta support.    · She has been continuing as well on Xgeva every 4 weeks (last received 9/6/2022).  · Patient did undergo MRI lumbar spine and pelvis on 9/24/2022.  MRI pelvis showed bilateral sacral fractures with marrow infiltration related to metastasis more prominent on the right.  Lesion on the right 3.5 x 4.1 x 4.3 cm.  Also probable metastasis along posterior acetabulum on the right 1.5 cm.  MRI lumbar spine with stable L1 metastasis, new edema endplate T12 possibly stress reaction versus developing new metastasis, lesion at T12 spinous process unchanged.   · Radiation oncology consulted with plans for palliative treatment of the right sacral metastasis.  Treatment initiated 9/28/2022  · She will complete radiation to the sacral metastasis on 10/11/2022.  · Following discharge she will undergo a PET/CT and depending upon the PET may resume Taxotere.  · Recommended skilled PT     *History of bilateral stage I breast cancer  · Patient with bilateral breast cancer, both stage I ([id number]), grade 2, ER/VT positive and HER2/melinda negative with low Ki-67.    · Patient initiated adjuvant letrozole in May 2021.  · Continue letrozole daily     *Cancer related pain  · Patient has been receiving Duragesic patch 50 mcg every 72 hours in addition to oxycodone 15 mg every 4 hours for breakthrough pain.  Duragesic dose had been escalated recently in the outpatient setting due to worsening pain  · On admission 9/23/2022, Duragesic  patch increased to 75 mcg daily, added Decadron 4 mg IV every 6 hours with continuation of oxycodone 15 mg every 4 hours as needed for breakthrough pain in addition of Dilaudid 1 mg every 2 hours as needed for breakthrough pain.  · Patient with increasing side effects from narcotics, Duragesic decreased on 9/25/2022 from 75 down to 50 mcg patch every 72 hours.  · Dexamethasone dose decreased to 4 mg p.o. every 12 hours on 9/27/2022  · Initiated palliative radiation to the right sacrum on 9/28/2022  · Patient became confused after receiving Dilaudid.  Patient and family asked to discontinue Dilaudid.  · Duragesic patch dose was increased to 75 mcg/h on 9/30/2022. Subsequent decrease to 50 mcg due to confusion  · Gabapentin was discontinued due to the sedating effect.  · Oxycodone is being used for breakthrough pain.  · 10/3 transition to sustained release oxycodone 40 mg bid and fentanyl patch stopped  · Pain well controlled on current regimen     *Anxiety/depression  · Patient was previously on Wellbutrin  mg daily, Lexapro 10 mg daily.  · On 9/30/2022, she was switched from Lexapro to Cymbalta 30 mg daily.  · Atarax initiated for restlessness, and     *Insomnia  · Patient was previously on Xanax but reported that it was not working.  · Patient previously tried melatonin and it was not effective.  · She was placed on Restoril 15 mg nightly on 10/1/2022.  · The patient had 3 falls since last night.  · Its not clear if this is due to Restoril but since this happened after the change, we switched back to Xanax-Home dose of 0.5 mg as needed for sleep.  · Well-controlled on Xanax     *Anemia  · Most recent evaluation 8/3/2022 with iron 15, ferritin 276, iron saturation 5%, TIBC 328, folate greater than 20, B12 392  · Anemia felt to be related to malignancy/chronic disease and chemotherapy  · Hemoglobin has remained in the 8-10 range  · Hemoglobin stable at 8.9     *Peripheral neuropathy  · Patient was on gabapentin  300 mg twice daily.  · Gabapentin was discontinued on 9/30/2022.  · Continue Cymbalta,     *Narcotic induced constipation  · Patient is on Sybil-Colace 2 tablets twice daily with MiraLAX daily   · Having regular bowel     *GI prophylaxis  · Protonix 40 mg daily     *DVT prophylaxis  · Lovenox 40 mg daily     *Elevated liver labs  · Atorvastatin was discontinued  · LFTs stable as of 10/9/2022  · No labs today    *Delirium  · Resolved    *BLE calf pain  · BLE venous duplex negative. Exam reassurring    *Fall with right scalp hematoma    *Deconditioning  · Continues to experience buckling of the knee  · Recommended skilled PT    *UTI  · Ucx with E coli, pansensitive  · Completed Rocephin  ·   RECOMMENDATIONS:  1. Last day of radiation today  2. Continue sustained release oxycydone 40 mg q12h, added on 10/3 and fentanyl patch stopped.  Improved pain.  3. Continue as needed oxygen  4. Cymbalta at 30 mg daily for neuropathy  5. Continue dexamethasone at 4 mg in the morning, will taper as output  6. Xanax qhs prn, for insomnia  7. Subacute rehabilitation versus home if she improves enough prior to discharge.  Outpatient PET scan then outpatient follow-up.        Salma Snell MD

## 2022-10-11 NOTE — PLAN OF CARE
Goal Outcome Evaluation: Pt is alert. Up to the bedside commode X 2. Room air.  Brief on. Radiation treatments completed today. D/C plan is rehab.

## 2022-10-12 ENCOUNTER — TELEPHONE (OUTPATIENT)
Dept: FAMILY MEDICINE CLINIC | Facility: CLINIC | Age: 57
End: 2022-10-12

## 2022-10-12 LAB
RAD ONC ARIA COURSE END DATE: NORMAL
RAD ONC ARIA COURSE ID: NORMAL
RAD ONC ARIA COURSE INTENT: NORMAL
RAD ONC ARIA COURSE LAST TREATMENT DATE: NORMAL
RAD ONC ARIA COURSE START DATE: NORMAL
RAD ONC ARIA COURSE TREATMENT ELAPSED DAYS: 13
RAD ONC ARIA FIRST TREATMENT DATE: NORMAL
RAD ONC ARIA PLAN FRACTIONS TREATED TO DATE: 10
RAD ONC ARIA PLAN ID: NORMAL
RAD ONC ARIA PLAN NAME: NORMAL
RAD ONC ARIA PLAN PRESCRIBED DOSE PER FRACTION: 3 GY
RAD ONC ARIA PLAN PRIMARY REFERENCE POINT: NORMAL
RAD ONC ARIA PLAN TOTAL FRACTIONS PRESCRIBED: 10
RAD ONC ARIA PLAN TOTAL PRESCRIBED DOSE: 3000 CGY
RAD ONC ARIA REFERENCE POINT DOSAGE GIVEN TO DATE: 30 GY
RAD ONC ARIA REFERENCE POINT DOSAGE GIVEN TO DATE: 30.28 GY
RAD ONC ARIA REFERENCE POINT ID: NORMAL
RAD ONC ARIA REFERENCE POINT ID: NORMAL

## 2022-10-12 PROCEDURE — 25010000002 HEPARIN LOCK FLUSH PER 10 UNITS: Performed by: INTERNAL MEDICINE

## 2022-10-12 PROCEDURE — 25010000002 ENOXAPARIN PER 10 MG: Performed by: INTERNAL MEDICINE

## 2022-10-12 PROCEDURE — 99232 SBSQ HOSP IP/OBS MODERATE 35: CPT | Performed by: INTERNAL MEDICINE

## 2022-10-12 PROCEDURE — 97535 SELF CARE MNGMENT TRAINING: CPT

## 2022-10-12 PROCEDURE — 97116 GAIT TRAINING THERAPY: CPT

## 2022-10-12 PROCEDURE — 63710000001 DEXAMETHASONE PER 0.25 MG: Performed by: INTERNAL MEDICINE

## 2022-10-12 RX ORDER — DULOXETIN HYDROCHLORIDE 30 MG/1
30 CAPSULE, DELAYED RELEASE ORAL NIGHTLY
Qty: 21 CAPSULE | Refills: 0 | Status: SHIPPED | OUTPATIENT
Start: 2022-10-12 | End: 2022-12-02 | Stop reason: SDUPTHER

## 2022-10-12 RX ADMIN — DEXAMETHASONE 4 MG: 4 TABLET ORAL at 08:14

## 2022-10-12 RX ADMIN — OXYCODONE HYDROCHLORIDE 15 MG: 15 TABLET ORAL at 21:46

## 2022-10-12 RX ADMIN — OXYCODONE HYDROCHLORIDE 15 MG: 15 TABLET ORAL at 00:29

## 2022-10-12 RX ADMIN — LEVOTHYROXINE SODIUM 25 MCG: 0.03 TABLET ORAL at 06:24

## 2022-10-12 RX ADMIN — Medication 10 ML: at 16:23

## 2022-10-12 RX ADMIN — HEPARIN 500 UNITS: 100 SYRINGE at 16:23

## 2022-10-12 RX ADMIN — Medication 10 ML: at 20:02

## 2022-10-12 RX ADMIN — OXYCODONE HYDROCHLORIDE 40 MG: 15 TABLET, FILM COATED, EXTENDED RELEASE ORAL at 20:01

## 2022-10-12 RX ADMIN — ALPRAZOLAM 0.5 MG: 0.5 TABLET ORAL at 20:01

## 2022-10-12 RX ADMIN — BUPROPION HYDROCHLORIDE 150 MG: 150 TABLET, EXTENDED RELEASE ORAL at 08:14

## 2022-10-12 RX ADMIN — OXYCODONE HYDROCHLORIDE 15 MG: 15 TABLET ORAL at 13:12

## 2022-10-12 RX ADMIN — Medication 2 PACKET: at 08:14

## 2022-10-12 RX ADMIN — ENOXAPARIN SODIUM 40 MG: 100 INJECTION SUBCUTANEOUS at 16:17

## 2022-10-12 RX ADMIN — OXYCODONE HYDROCHLORIDE 15 MG: 15 TABLET ORAL at 08:35

## 2022-10-12 RX ADMIN — LETROZOLE 2.5 MG: 2.5 TABLET, FILM COATED ORAL at 08:14

## 2022-10-12 RX ADMIN — Medication 2 PACKET: at 17:18

## 2022-10-12 RX ADMIN — Medication 10 ML: at 08:29

## 2022-10-12 RX ADMIN — DULOXETINE HYDROCHLORIDE 30 MG: 30 CAPSULE, DELAYED RELEASE ORAL at 20:01

## 2022-10-12 RX ADMIN — OXYCODONE HYDROCHLORIDE 40 MG: 15 TABLET, FILM COATED, EXTENDED RELEASE ORAL at 08:14

## 2022-10-12 RX ADMIN — Medication 1000 MCG: at 08:14

## 2022-10-12 RX ADMIN — OXYCODONE HYDROCHLORIDE 15 MG: 15 TABLET ORAL at 17:10

## 2022-10-12 RX ADMIN — OXYCODONE HYDROCHLORIDE 15 MG: 15 TABLET ORAL at 04:33

## 2022-10-12 RX ADMIN — PANTOPRAZOLE SODIUM 40 MG: 40 TABLET, DELAYED RELEASE ORAL at 06:24

## 2022-10-12 RX ADMIN — CALCITRIOL 1 MCG: 0.25 CAPSULE ORAL at 08:14

## 2022-10-12 RX ADMIN — DOCUSATE SODIUM 50MG AND SENNOSIDES 8.6MG 2 TABLET: 8.6; 5 TABLET, FILM COATED ORAL at 20:01

## 2022-10-12 RX ADMIN — HYDROXYZINE HYDROCHLORIDE 25 MG: 25 TABLET ORAL at 03:39

## 2022-10-12 NOTE — THERAPY TREATMENT NOTE
Patient Name: Holli Patel  : 1965    MRN: [id number]                              Today's Date: 10/12/2022       Admit Date: 2022    Visit Dx:     ICD-10-CM ICD-9-CM   1. Primary adenocarcinoma of upper lobe of left lung (HCC)  C34.12 162.3   2. 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)  C34.90 162.9    C79.70 198.7     Patient Active Problem List   Diagnosis   • History of gastroesophageal reflux (GERD)   • Primary insomnia   • Mixed hyperlipidemia   • Other specified hypothyroidism   • Arthritis   • Anxiety   • History of migraine   • Essential hypertension   • History of colon polyps   • Diverticulosis   • Leukocytosis   • Personal history of tobacco use   • Osteopenia   • Dyspnea   • Malignant neoplasm of upper-outer quadrant of right breast in female, estrogen receptor positive (HCC)   • Malignant neoplasm of upper-inner quadrant of left breast in female, estrogen receptor positive (HCC)   • Primary adenocarcinoma of upper lobe of left lung (HCC)   • Encounter for fitting and adjustment of vascular catheter   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)   • 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)   • History of bilateral breast cancer   • History of DVT (deep vein thrombosis)   • Stage 3a chronic kidney disease (HCC)   • Confusion   • Chemotherapy-induced neutropenia (HCC)   • Metabolic acidosis   • Ataxia   • Bony metastasis (HCC)   • Hypopotassemia   • Hypophosphatemia   • Hypomagnesemia   • Cancer related pain   • Acute cystitis without hematuria     Past Medical History:   Diagnosis Date   • Anxiety    • Clot     POSSIBLE BLOOD CLOT IN VENOUS ACCESS DEVICE-PT STATES WAS STARTED ON ELIQUIS    • Dyspnea on exertion    • Elevated cholesterol    • Frequent urination     DAY AND NIGHT   • SHAYY (generalized anxiety disorder)     RELATED HIGH BLOOD PRESSURE   •  GERD (gastroesophageal reflux disease)    • High blood pressure     ANXIETY RELATED   • Hyperlipidemia    • Hypothyroidism    • Lung cancer (HCC)    • Lung nodule     LEFT   • Malignant neoplasm of upper-outer quadrant of right breast in female, estrogen receptor positive (HCC) 4/13/2021    PT STATES HAS BILAT BREAST CANCER   • Migraine    • PONV (postoperative nausea and vomiting)      Past Surgical History:   Procedure Laterality Date   • BREAST BIOPSY Bilateral 04/07/2021    Right Breast 10 o'clock & Left breast 10 o'clock 6 cm from nipple, [facility]   • CLOSED REDUCTION HIP DISLOCATION Left 4/30/2021    Procedure: BRONCHOSCOPY, LEFT VIDEO ASSITED THORACOSCOPY, ROBOTIC ASSSITED MEDIASTINAL LYMPHADENECTOMY WITH INTERCOSTAL NERVE BLOCKS;  Surgeon: Raphael Kerr III, MD;  Location: [facility] MAIN OR;  Service: DaVinci;  Laterality: Left;   • COLONOSCOPY     • TUBAL ABDOMINAL LIGATION     • VENOUS ACCESS DEVICE (PORT) INSERTION Right 5/20/2021    Procedure: INSERTION VENOUS ACCESS DEVICE;  Surgeon: Raphael Kerr III, MD;  Location: [facility] MAIN OR;  Service: Thoracic;  Laterality: Right;   • VENOUS ACCESS DEVICE (PORT) INSERTION Right 11/29/2021    Procedure: REVISION AND REPLACEMENT RIGHT SUBCLAVIAN VENOUS ACCESS PORT;  Surgeon: Raphael Kerr III, MD;  Location: [facility] MAIN OR;  Service: Thoracic;  Laterality: Right;   • WRIST SURGERY Right       General Information     Row Name 10/12/22 1234          OT Time and Intention    Document Type therapy note (daily note)  -     Mode of Treatment occupational therapy  -     Row Name 10/12/22 1234          General Information    Patient Profile Reviewed yes  -     Existing Precautions/Restrictions fall  -     Row Name 10/12/22 1234          Cognition    Orientation Status (Cognition) oriented x 3  -     Row Name 10/12/22 1234          Safety Issues, Functional Mobility    Safety Issues Affecting Function (Mobility) judgment;problem-solving;safety precautions  follow-through/compliance;sequencing abilities  -     Impairments Affecting Function (Mobility) endurance/activity tolerance;strength;balance;cognition  -     Cognitive Impairments, Mobility Safety/Performance judgment;problem-solving/reasoning;insight into deficits/self-awareness;safety precaution awareness;safety precaution follow-through  -           User Key  (r) = Recorded By, (t) = Taken By, (c) = Cosigned By    Initials Name Provider Type     Kath Hummel OT Occupational Therapist                 Mobility/ADL's     Row Name 10/12/22 [facility]          Bed Mobility    Bed Mobility supine-sit;sit-supine  -     Supine-Sit [place] (Bed Mobility) verbal cues;standby assist  -     Sit-Supine [place] (Bed Mobility) verbal cues;standby assist  -     Assistive Device (Bed Mobility) bed rails;head of bed elevated  -     Row Name 10/12/22 [facility]          Transfers    Transfers sit-stand transfer;toilet transfer  -     Row Name 10/12/22 [facility]          Sit-Stand Transfer    Sit-Stand [place] (Transfers) verbal cues;contact guard  -     Assistive Device (Sit-Stand Transfers) walker, front-wheeled  -     Row Name 10/12/22 [facility]          Toilet Transfer    Type (Toilet Transfer) stand pivot/stand step  -     [place] Level (Toilet Transfer) contact guard;verbal cues  -     Assistive Device (Toilet Transfer) grab bars/safety frame;walker, front-wheeled  -     Row Name 10/12/22 [facility]          Functional Mobility    Functional Mobility- Ind. Level contact guard assist;verbal cues required  -     Functional Mobility- Device walker, front-wheeled  [facility]     Functional Mobility- Comment fxl ambulation into bathroom using RW with CGA, cues for navigation and safety  -     Row Name 10/12/22 [facility]          Activities of Daily Living    BADL Assessment/Intervention toileting;grooming  -     Row Name 10/12/22 [facility]          Toileting Assessment/Training    [place] Level (Toileting) minimum  "assist (75% patient effort)  -     Assistive Devices (Toileting) grab bar/safety frame  -     Position (Toileting) unsupported sitting;supported standing  -     Comment, (Toileting) Clarissa for clothing mgt  -     Row Name 10/12/22 1236          Grooming Assessment/Training    [place] Level (Grooming) wash face, hands;verbal cues;standby assist  -     Position (Grooming) sink side;supported standing  -           User Key  (r) = Recorded By, (t) = Taken By, (c) = Cosigned By    Initials Name Provider Type    Kath Quezada OT Occupational Therapist               Obj/Interventions     Row Name 10/12/22 1238          Balance    Static Standing Balance contact guard  -     Dynamic Standing Balance contact guard;minimal assist  -     Balance Interventions sitting;standing;occupation based/functional task  -     Comment, Balance unsteady but no overt LOB today  -           User Key  (r) = Recorded By, (t) = Taken By, (c) = Cosigned By    Initials Name Provider Type    Kath Quezada OT Occupational Therapist               Goals/Plan    No documentation.                Clinical Impression     Row Name 10/12/22 1239          Pain Assessment    Pretreatment Pain Rating 0/10 - no pain  -     Posttreatment Pain Rating 0/10 - no pain  -     Row Name 10/12/22 1239          Plan of Care Review    Progress improving  -     Outcome Evaluation Pt was found supine in bed, agreeable to OOB ax with OT this AM. She sits EOB with SBA, STS and fxl ambulation into bathroom using RW with CGA. Cues for safety and navigation. CGA for toilet TF and Clarissa for clothing mgt. Pt completes oral care standing at the sink with set-up/SBA and cues for sequencing. Pt reports legs \"feeling tired\" and returns to supine in bed, left with all needs in reach.  -     Row Name 10/12/22 1239          Therapy Plan Review/Discharge Plan (OT)    Anticipated Discharge Disposition (OT) home with home health;home with assist;home with " 24/7 care  -     Row Name 10/12/22 1239          Positioning and Restraints    In Bed fowlers;call light within reach;encouraged to call for assist;exit alarm on;with family/caregiver  sitter present  -           User Key  (r) = Recorded By, (t) = Taken By, (c) = Cosigned By    Initials Name Provider Type     Kath Hummel OT Occupational Therapist               Outcome Measures     Row Name 10/12/22 0814          How much help from another person do you currently need...    Turning from your back to your side while in flat bed without using bedrails? 3  -DH     Moving from lying on back to sitting on the side of a flat bed without bedrails? 3  -DH     Moving to and from a bed to a chair (including a wheelchair)? 3  -DH     Standing up from a chair using your arms (e.g., wheelchair, bedside chair)? 3  -DH     Climbing 3-5 steps with a railing? 2  -DH     To walk in hospital room? 2  -DH     AM-PAC 6 Clicks Score (PT) 16  -DH     Highest level of mobility 5 --> Static standing  -           User Key  (r) = Recorded By, (t) = Taken By, (c) = Cosigned By    Initials Name Provider Type     Yosvany Olea, RN Registered Nurse                Occupational Therapy Education     Title: PT OT SLP Therapies (Done)     Topic: Occupational Therapy (Done)     Point: ADL training (Done)     Description:   Instruct learner(s) on proper safety adaptation and remediation techniques during self care or transfers.   Instruct in proper use of assistive devices.              Learning Progress Summary           Patient Acceptance, E, VU by  at 10/11/2022 1100    Acceptance, E, VU by  at 10/5/2022 1931    Acceptance, E, VU by HF at 10/4/2022 1944    Acceptance, E, VU by HF at 10/4/2022 1943    Acceptance, E, VU by HF at 10/4/2022 1942    Acceptance, E, VU by HF at 9/29/2022 1157    Acceptance, TB,E, VU,NR by  at 9/26/2022 1555    Comment: ed pt on role of OT. benefit of therapy, POC w. OT . ed on safety w. ADLa nd tsf.    Family Acceptance, E, VU by  at 9/29/2022 1157   Significant Other Acceptance, E, VU by  at 10/11/2022 1100                   Point: Home exercise program (Done)     Description:   Instruct learner(s) on appropriate technique for monitoring, assisting and/or progressing therapeutic exercises/activities.              Learning Progress Summary           Patient Acceptance, E, VU by  at 10/11/2022 1100    Acceptance, E, VU by HF at 10/5/2022 1931    Acceptance, E, VU by HF at 10/4/2022 1944    Acceptance, E, VU by HF at 10/4/2022 1943    Acceptance, E, VU by HF at 10/4/2022 1942    Acceptance, E, VU by HF at 9/29/2022 1157    Acceptance, TB,E, VU,NR by  at 9/26/2022 1555    Comment: ed pt on role of OT. benefit of therapy, POC w. OT . ed on safety w. ADLa nd tsf.   Family Acceptance, E, VU by  at 9/29/2022 1157   Significant Other Acceptance, E, VU by  at 10/11/2022 1100                   Point: Precautions (Done)     Description:   Instruct learner(s) on prescribed precautions during self-care and functional transfers.              Learning Progress Summary           Patient Acceptance, E, VU by  at 10/11/2022 1100    Acceptance, E, VU by  at 10/5/2022 1931    Acceptance, E, VU by HF at 10/4/2022 1944    Acceptance, E, VU by HF at 10/4/2022 1943    Acceptance, E, VU by HF at 10/4/2022 1942    Acceptance, E, VU by HF at 9/29/2022 1157    Acceptance, TB,E, VU,NR by  at 9/26/2022 1555    Comment: ed pt on role of OT. benefit of therapy, POC w. OT . ed on safety w. ADLa nd tsf.   Family Acceptance, E, VU by  at 9/29/2022 1157   Significant Other Acceptance, E, VU by  at 10/11/2022 1100                   Point: Body mechanics (Done)     Description:   Instruct learner(s) on proper positioning and spine alignment during self-care, functional mobility activities and/or exercises.              Learning Progress Summary           Patient Acceptance, E, VU by  at 10/11/2022 1100    Acceptance, E, VU by  " at 10/5/2022 1931    Acceptance, E, VU by  at 10/4/2022 1944    Acceptance, E, VU by  at 10/4/2022 1943    Acceptance, E, VU by  at 10/4/2022 1942    Acceptance, E, VU by  at 9/29/2022 1157    Acceptance, TB,E, VU,NR by  at 9/26/2022 1555    Comment: ed pt on role of OT. benefit of therapy, POC w. OT . ed on safety w. ADLa nd tsf.   Family Acceptance, E, VU by  at 9/29/2022 1157   Significant Other Acceptance, E, VU by  at 10/11/2022 1100                               User Key     Initials Effective Dates Name Provider Type Discipline     06/16/21 -  Gaby Odom OTR Occupational Therapist OT     08/04/22 -  Yosvany Olea, RN Registered Nurse Nurse     09/22/22 -  Karma Parham, RN Registered Nurse Nurse              OT Recommendation and Plan     Plan of Care Review  Progress: improving  Outcome Evaluation: Pt was found supine in bed, agreeable to OOB ax with OT this AM. She sits EOB with SBA, STS and fxl ambulation into bathroom using RW with CGA. Cues for safety and navigation. CGA for toilet TF and Clarissa for clothing mgt. Pt completes oral care standing at the sink with set-up/SBA and cues for sequencing. Pt reports legs \"feeling tired\" and returns to supine in bed, left with all needs in reach.     Time Calculation:    Time Calculation- OT     Row Name 10/12/22 1241             Time Calculation- OT    OT Start Time 1058  -      OT Stop Time 1115  -      OT Time Calculation (min) 17 min  -      Total Timed Code Minutes- OT 17 minute(s)  -JW      OT Received On 10/12/22  -      OT - Next Appointment 10/14/22  -         Timed Charges    24614 - OT Self Care/Mgmt Minutes 17  -         Total Minutes    Timed Charges Total Minutes 17  -       Total Minutes 17  -            User Key  (r) = Recorded By, (t) = Taken By, (c) = Cosigned By    Initials Name Provider Type    JW Kath Hummel, OT Occupational Therapist              Therapy Charges for Today     Code " Description Service Date Service Provider Modifiers Qty    [id number] HC OT SELF CARE/MGMT/TRAIN EA 15 MIN 10/12/2022 Kath Hummel OT GO 1               Kath Hummel OT  10/12/2022

## 2022-10-12 NOTE — DISCHARGE PLACEMENT REQUEST
"Taniya Patel (57 y.o. Female)     Date of Birth   1965    Social Security Number       Address   [address] [place] [address]    Home Phone   [phone]    MRN   [id number]       [facility]    Marital Status                               Admission Date   9/23/22    Admission Type   Urgent    Admitting Provider   Kerrie Last MD    Attending Provider   Rubén Benjamin MD    Department, Room/Bed   [facility] [address] [place], P399/1       Discharge Date       Discharge Disposition       Discharge Destination                               Attending Provider: Rubén Benjamin MD    Allergies: No Known Allergies    Isolation: None   Infection: None   Code Status: CPR    Ht: 160 cm (63\")   Wt: 65.4 kg (144 lb 2.9 oz)    Admission Cmt: None   Principal Problem: Cancer related pain [G89.3]                 Active Insurance as of 9/23/2022     Primary Coverage     Payor Plan Insurance Group Employer/Plan Group    HUMANA HUMANA [id number]     Payor Plan Address Payor Plan Phone Number Payor Plan Fax Number Effective Dates    [address] [address] [phone]  1/1/2020 - None Entered    [facility] [id number]       Subscriber Name Subscriber Birth Date Member ID       TANIYA PATEL 1965 [id number]                 Emergency Contacts      (Rel.) Home Phone Work Phone Mobile Phone    AUREABK VINCENT (Spouse) [phone] -- --              "

## 2022-10-12 NOTE — CASE MANAGEMENT/SOCIAL WORK
Continued Stay Note  [facility]     Patient Name: Holli Patel  MRN: [id number]  Today's Date: 10/12/2022    Admit Date: 9/23/2022    Plan: home with family support and VNA -  declined BSC and Walker   Discharge Plan     Row Name 10/12/22 1726       Plan    Plan home with family support and VNA -  declined BSC and Walker    Plan Comments Spoke with Karlene with [facility] home health informed her of the discharge today. Spoke with Geovanny with [facility], BSC and walker would be at no additional expense since patient has met deductible this year. Per Geovanny insurance does not cover both expense of walker and wheelchair,  made aware.   states he wants a wheelchair for when she goes to appointments, as she can walk short distances but not long distances. Reassured  that any of the MD offices will have wheelchairs that she could use.  He has also mentioned earlier that he would reach out to friends to see if anyone had a wheelchair, he could borrow.  has expressed multiple times he does not want any additional charges.  CCP informed patient that we cannot guarantee no additional charges. When Geovanny attempted to deliver patient’s  declined the equipment.    states that he will come and get his wife in a couple of hours.  Will continue to monitor for new or changing discharge needs.  Zoya DUBON RN CCP               Discharge Codes    No documentation.               Expected Discharge Date and Time     Expected Discharge Date Expected Discharge Time    Oct 12, 2022             Zoya Wild RN

## 2022-10-12 NOTE — NURSING NOTE
PT d/c order placed,  arrived around 1800 to voice concerns regarding discharge and medications. RN confirmed prescription for Xtampza ER was sent and ready for pickup at [facility] pharmacy on [address]. PT and  concerned about ER medication not being available until AM, since [facility] pharmacy was now closed. MD approved a early administration of pts scheduled ER, however pharmacy does not open until 830 AM, therefore giving medication at 1855 would result in patient needing morning dose early. RN spoke with MD on call Dr. Benjamin to inform him of d/c appeal and reasoning. MD ok with waiting until AM. Will add delay to d/c. Currently pt is resting, bed alarm on zone 2, call light tray table all within reach.  still at bedside.

## 2022-10-12 NOTE — NURSING NOTE
Pt is clear to D/C. CCP set up home health with a walker and bedside commode per husbands request. When [facility] tried to deliver the equipment Pts  refused the equipment.

## 2022-10-12 NOTE — DISCHARGE SUMMARY
Patient Name: Holli Patel  : 1965  MRN: [id number]    Date of Admission: 2022  Date of Discharge:  10/12/2022  Primary Care Physician: Vandana Gastelum MD      Chief Complaint:   No chief complaint on file.      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        Hospital Course     Ms. Patel is a 57 y.o. female with a history of metastasis of her cancer to the lower spine.  She has metastatic non-small cell lung cancer. Patient was admitted with encephalopathy and multiple falls. Also found to have  a UTI and completed abx with IV rocephin.  She is undergoing radiation and has been on steroids.  She is on multiple medications and has had some confusion with polypharmacy.  MRI 2 months ago was negative for metastasis.   She has had improved mentation since fentanyl has been stopped and she is now on long-acting oxycodone.  Oncology wanted to keep her here to complete her acute radiation treatments. She is now stable with impeoved mentation. Family wants her to return home with home health.  Day of Discharge     Subjective:  She feels better , no new complaints and wants to go home.     Physical Exam:  Temp:  [97 °F (36.1 °C)-97.3 °F (36.3 °C)] 97.1 °F (36.2 °C)  Heart Rate:  [76-88] 88  Resp:  [17-18] 18  BP: ()/(76-77) 95/76  Body mass index is 25.54 kg/m².  Physical Exam  Vitals reviewed.   Constitutional:       Appearance: She is well-developed.      Comments: Chronic ill appearing.    HENT:      Head: Normocephalic and atraumatic.   Cardiovascular:      Rate and  Rhythm: Normal rate and regular rhythm.   Pulmonary:      Effort: Pulmonary effort is normal. No respiratory distress.      Breath sounds: Normal breath sounds.   Abdominal:      General: Bowel sounds are normal. There is no distension.      Palpations: Abdomen is soft. There is no mass.      Tenderness: There is no abdominal tenderness.      Hernia: No hernia is present.   Skin:     General: Skin is warm and dry.      Findings: Bruising present.   Neurological:      Mental Status: She is alert and oriented to person, place, and time.   Psychiatric:         Behavior: Behavior normal.         Thought Content: Thought content normal.         Judgment: Judgment normal.         Consultants     Consult Orders (all) (From admission, onward)     Start     Ordered    09/30/22 0924  Inpatient Neurology Consult General  Once        Specialty:  Neurology  Provider:  Cecil Herrera MD    09/30/22 0924 09/28/22 1150  Inpatient Oncology Social Worker Consult  Once        Comments: Admitted for metastatic lung cancer pain management - new sacral fractures consulted radiation.   at bedside with anxiety, insisting that she eat, etc.   Provider:  (Not yet assigned)    09/28/22 1152    09/25/22 1359  Inpatient Radiation Oncology Consult  Once        Specialty:  Radiation Oncology  Provider:  Raphael Sam MD    09/25/22 1359    09/23/22 2106  Inpatient Hematology & Oncology Consult  Once        Specialty:  Hematology and Oncology  Provider:  Ulises Wooten MD    09/23/22 2106              Procedures     * Surgery not found *      Imaging Results (All)     Procedure Component Value Units Date/Time    CT Head Without Contrast [[id number]] Collected: 10/04/22 0725     Updated: 10/04/22 0737    Narrative:      CT HEAD WITHOUT CONTRAST     CLINICAL HISTORY: Multiple falls with scalp hematoma and headache.     TECHNIQUE: CT scan of the head was obtained with 3 mm axial soft tissue  algorithm and 2 mm bone  algorithm images. No intravenous contrast was  administered. Sagittal and coronal reconstructions were obtained.     COMPARISON: Brain MRI dated 08/04/2022.     FINDINGS:       There is a prominent size right frontal convexity scalp hematoma which  extends into the right preseptal soft tissues. However, there is no  evidence for a calvarial fracture. There is no evidence for an acute  extra-axial hemorrhage.     The ventricles, sulci, and cisterns are age appropriate. There are mild  changes of chronic small vessel ischemic phenomena. The gray-white  matter differentiation is within normal limits. Old lacunar disease is  identified within the right caudate. The chronic small vessel ischemic  phenomena and old lacunar disease were also present on the most recent  brain MRI.     Incidental note is made of minor mucosal thickening within the maxillary  sinuses. Mild scattered mucosal thickening is seen within the ethmoid  air cells and the left aspect of the frontal sinus.       Impression:         Note is made of a prominent size right frontal convexity scalp hematoma  which extends into the right preseptal region. However, there is no  evidence for acute traumatic intracranial pathology.     Incidental note is made of mild changes of chronic small vessel ischemic  phenomena as well as a chronic lacune within the right caudate.     Incidental note is also made of mild changes of inflammatory paranasal  sinus disease.     Radiation dose reduction techniques were utilized, including automated  exposure control and exposure modulation based on body size.     This report was finalized on 10/4/2022 7:34 AM by Dr. Stu Oates M.D.       US Liver [[id number]] Collected: 10/02/22 1332     Updated: 10/02/22 1336    Narrative:      PROCEDURE: US LIVER-     HISTORY: Elevated liver function enzymes.     TECHNIQUE: Grayscale and color Doppler ultrasound of the liver was  performed.     COMPARISON: PET/CT 7/18/2022       FINDINGS:     MEASUREMENTS:   Liver:  15.8 cm   Common bile duct diameter:  0.5 cm  Right kidney: 8.3 cm     LIVER: The liver is normal in size and echogenicity. Evaluation of the  hepatic parenchyma is limited by poor sonographic windows. There is no  intrahepatic biliary ductal dilatation. There is hepatopetal flow in the  main portal vein.      GALLBLADDER: The gallbladder is present without cholelithiasis, wall  thickening or pericholecystic fluid. No sonographic Zhou's sign was  elicited. There is no extrahepatic biliary ductal dilatation.     PANCREAS: The pancreas is poorly seen and cannot be evaluated.     RIGHT KIDNEY: The right kidney is low normal in size. There is no  hydronephrosis. No shadowing renal stone is visualized.          Impression:         No gallstones or biliary ductal dilatation.     This report was finalized on 10/2/2022 1:33 PM by Dr. Mary Goetz M.D.       MRI Lumbar Spine With & Without Contrast [[id number]] Collected: 09/24/22 2009     Updated: 09/25/22 1122    Narrative:        Patient: TANIYA CARPENTER  Time Out: 20:08  Exam(s): MRI L SPINE W WO Contrast     EXAM:    MR Lumbar Spine Without and With Intravenous Contrast    CLINICAL HISTORY:     Reason for exam: Bone mass or bone pain, lumbar spine, aggressive   features on xray.    TECHNIQUE:    Magnetic resonance images of the lumbar spine without and with   intravenous contrast in multiple planes.    COMPARISON:  August 4, 2022.    FINDINGS:    Vertebrae: Again identified is evidence of osseous metastasis,   primarily to the L1 vertebral body, as well as the sacrum, especially on   the right.  See prior dictation for discussion.  As previously suggested,   there is a probable metastasis to the spinous process of T12, and there   is some mild vertebral body height loss at L2, but this is chronic.  This   is unchanged in comparison to the prior examination.  Also identified is   metastasis involving the left L1 facet.  This is  unchanged.    In comparison to the prior examination, the patient has developed edema   involving the inferior endplate of T12, probably related to a stress   reaction versus early metastasis.    Spinal cord:  Unremarkable.  Normal signal.  No abnormal enhancement.    Soft tissues:  Unremarkable.     DISCS SPINAL CANAL NEURAL FORAMINA:    L1-L2: Small disc bulge present, with mild central canal stenosis.    L2-L3:  Unremarkable.  No significant disc disease.  No stenosis.    L3-L4: Small disc bulge present, but no substantial central canal   stenosis.    L4-L5:  Unremarkable.  No significant disc disease.  No stenosis.    L5-S1: Small disc bulge present, and in combination with facet and   ligamentum flavum hypertrophy, this does contribute to moderate to severe   central canal stenosis, with bilateral neural foraminal stenosis, but   this is unchanged.    IMPRESSION:       Again identified is L1 osseous metastasis, similar in comparison to the   prior examination.  The patient has developed edema within the inferior   endplate of T12, in comparison to the prior examination, and this could   be related to either a stress reaction, versus a new developing osseous   metastasis.  Sacral osseous metastasis cannot identified, unchanged.    The T12 spinous process osseous metastasis unchanged.    The distal echogenic pathology is unchanged.  Central canal stenosis   present, worst at L5-S1    Impression:          Electronically signed by Ariel Cotto D.O. on 09-24-22 at 2008    MRI Pelvis With & Without Contrast [[id number]] Collected: 09/24/22 1847     Updated: 09/24/22 1847    Narrative:        Patient: TANIYA CARPENTER  Time Out: 18:46  Exam(s): MRI PELVIS W WO Contrast     EXAM:    MR Pelvis Without and With Intravenous Contrast    CLINICAL HISTORY:     Reason for exam: Metastatic lung cancer, progressive low back pelvic   pain.    TECHNIQUE:    Multiplanar magnetic resonance images of the pelvis without and with    intravenous contrast.    COMPARISON:    No relevant prior studies available.    FINDINGS:    Bowel:  Unremarkable.  No obstruction.  No mucosal thickening.    Bladder:  Unremarkable.  No stones.  No mass.    Reproductive:  Unremarkable as visualized.    Bones joints: Bilateral sacral fractures are present, with marrow   infiltration, and findings are likely related to osseous metastasis, more   prominent on the right, contributing to a pathologic fracture.  The   probable osseous metastasis on the right measures on the order of 3.5 x 4.  1 x 4.3 cm in size.  No substantial displacement.  Evaluation is   technically limited secondary to patient motion.  This does obscure some   of the anatomic detail.    Also identified is a probable osseous metastasis along the posterior   aspect of the acetabulum, on the right, and this measures on the order of   1.5 cm in diameter.      Soft tissues:  Unremarkable.    Vasculature:  Unremarkable.  No lower abdominal aortic aneurysm.    Lymph nodes:  Unremarkable.  No enlarged lymph nodes.    IMPRESSION:         Osseous metastasis present within the sacrum, worst within the right   sacral ala, and there are probable pathologic fractures bilaterally.    Also identified is a posterior column right acetabular osseous metastasis.      Impression:          Electronically signed by Ariel Cotto D.O. on 09-24-22 at 1846        Results for orders placed during the hospital encounter of 09/23/22    Duplex Venous Lower Extremity - Bilateral CAR    Interpretation Summary  · Normal bilateral lower extremity venous duplex scan.      Pertinent Labs     Results from last 7 days   Lab Units 10/11/22  0519 10/09/22  0544 10/08/22  0646 10/07/22  0517   WBC 10*3/mm3 7.81 7.91 10.55 11.08*   HEMOGLOBIN g/dL 8.9* 8.8* 9.1* 9.6*   PLATELETS 10*3/mm3 166 170 167 164     Results from last 7 days   Lab Units 10/11/22  0519 10/09/22  0544 10/08/22  0647 10/07/22  0517   SODIUM mmol/L 141 140 140 140    POTASSIUM mmol/L 3.7 4.0 3.1* 3.3*   CHLORIDE mmol/L 106 109* 105 104   CO2 mmol/L 24.0 23.7 24.4 25.4   BUN mg/dL 13 14 15 16   CREATININE mg/dL 0.82 0.81 0.88 0.90   GLUCOSE mg/dL 70 77 92 104*   EGFR mL/min/1.73 82.5 83.7 75.8 73.8     Results from last 7 days   Lab Units 10/11/22  0519 10/09/22  0544 10/08/22  0647 10/07/22  0517   ALBUMIN g/dL 3.50 3.30* 3.30* 3.60   BILIRUBIN mg/dL 0.2 0.3 0.4 0.4   ALK PHOS U/L 96 94 97 99   AST (SGOT) U/L 26 28 33* 28   ALT (SGPT) U/L 89* 103* 106* 99*     Results from last 7 days   Lab Units 10/11/22  0519 10/09/22  0544 10/08/22  0647 10/07/22  0517 10/06/22  0557   CALCIUM mg/dL 8.2* 8.5* 8.1* 8.1* 8.4*   ALBUMIN g/dL 3.50 3.30* 3.30* 3.60 3.60   MAGNESIUM mg/dL  --   --   --   --  2.1               Invalid input(s): LDLCALC          Test Results Pending at Discharge       Discharge Details        Discharge Medications      New Medications      Instructions Start Date   DULoxetine 30 MG capsule  Commonly known as: CYMBALTA   30 mg, Oral, Nightly      Xtampza ER 36 MG capsule extended-release 12 hour   Generic drug: oxyCODONE ER   36 mg, Oral, Every 12 Hours         Changes to Medications      Instructions Start Date   ALPRAZolam 0.5 MG tablet  Commonly known as: XANAX  What changed:   · when to take this  · reasons to take this   TAKE ONE TABLET BY MOUTH TWICE A DAY AS NEEDED FOR ANXIETY         Continue These Medications      Instructions Start Date   buPROPion  MG 24 hr tablet  Commonly known as: WELLBUTRIN XL   150 mg, Oral, Daily      calcitriol 0.5 MCG capsule  Commonly known as: ROCALTROL   TAKE TWO CAPSULES BY MOUTH DAILY      dexamethasone 4 MG tablet  Commonly known as: DECADRON   4 mg, Oral, Daily With Breakfast      escitalopram 10 MG tablet  Commonly known as: Lexapro   10 mg, Oral, Daily      fentaNYL 50 MCG/HR patch  Commonly known as: DURAGESIC   1 patch, Transdermal, Every 72 Hours, 1 patch      folic acid 1 MG tablet  Commonly known as: FOLVITE    TAKE ONE TABLET BY MOUTH DAILY      letrozole 2.5 MG tablet  Commonly known as: FEMARA   2.5 mg, Oral, Daily      omeprazole 20 MG capsule  Commonly known as: PrilOSEC   20 mg, Oral, 2 Times Daily      Phos-NaK 280-160-250 MG pack packet  Generic drug: potassium & sodium phosphates   2 packets, Oral, 2 Times Daily Before Meals      prochlorperazine 10 MG tablet  Commonly known as: COMPAZINE   10 mg, Oral, Every 6 Hours PRN      rizatriptan 10 MG tablet  Commonly known as: MAXALT   10 mg, Oral, Once, AT ONSET OF HEADACHE MAY REPEAT ONE TABLET IN 2 HOURS IF NEEDED      simvastatin 20 MG tablet  Commonly known as: ZOCOR   20 mg, Oral, Daily         Stop These Medications    oxyCODONE 15 MG immediate release tablet  Commonly known as: ROXICODONE        ASK your doctor about these medications      Instructions Start Date   gabapentin 300 MG capsule  Commonly known as: NEURONTIN   300 mg, Oral, 3 Times Daily, TAKE ONE CAPSULES BY MOUTH THREE TIMES A DAY      levothyroxine 25 MCG tablet  Commonly known as: SYNTHROID, LEVOTHROID   25 mcg, Oral, Daily             No Known Allergies    Discharge Disposition:  Home-Health Care Svc      Discharge Diet:  Diet Order   Procedures   • Diet Regular; Cardiac       Discharge Activity:   Activity Instructions     Activity as Tolerated      Activity as Tolerated            CODE STATUS:    Code Status and Medical Interventions:   Ordered at: 09/23/22 2006     Code Status (Patient has no pulse and is not breathing):    CPR (Attempt to Resuscitate)     Medical Interventions (Patient has pulse or is breathing):    Full Support       Future Appointments   Date Time Provider Department Center   12/2/2022 12:45 PM Vandana Gastelum MD [facility]  [facility]     Additional Instructions for the Follow-ups that You Need to Schedule     Ambulatory Referral to Home Health   As directed      Face to Face Visit Date: 10/12/2022    Follow-up provider for Plan of Care?: I treated the patient in an acute care  facility and will not continue treatment after discharge.    Follow-up provider: JASMINE PEDRO [[id number]]    Reason/Clinical Findings: cancer    Describe mobility limitations that make leaving home difficult: chemthera    Nursing/Therapeutic Services Requested: Skilled Nursing Physical Therapy    Skilled nursing orders: Medication education    PT orders: Therapeutic exercise Gait Training Strengthening    Weight Bearing Status: As Tolerated    Frequency: Other         Ambulatory Referral to Home Health   As directed      Face to Face Visit Date: 10/12/2022    Follow-up provider for Plan of Care?: I treated the patient in an acute care facility and will not continue treatment after discharge.    Follow-up provider: JASMINE PEDRO [[id number]]    Reason/Clinical Findings: cancer    Describe mobility limitations that make leaving home difficult: impaired obility    Nursing/Therapeutic Services Requested: Physical Therapy Skilled Nursing    Skilled nursing orders: Medication education    PT orders: Gait Training Transfer training Strengthening    Weight Bearing Status: As Tolerated    Frequency: Other            Follow-up Information     Vandana Gastelum MD .    Specialty: Family Medicine  Contact information:  [address] [id number] [phone]                         Additional Instructions for the Follow-ups that You Need to Schedule     Ambulatory Referral to Home Health   As directed      Face to Face Visit Date: 10/12/2022    Follow-up provider for Plan of Care?: I treated the patient in an acute care facility and will not continue treatment after discharge.    Follow-up provider: JASMINE PEDRO [[id number]]    Reason/Clinical Findings: cancer    Describe mobility limitations that make leaving home difficult: chemthera    Nursing/Therapeutic Services Requested: Skilled Nursing Physical Therapy    Skilled nursing orders: Medication education    PT orders: Therapeutic exercise Gait Training  Strengthening    Weight Bearing Status: As Tolerated    Frequency: Other         Ambulatory Referral to Home Health   As directed      Face to Face Visit Date: 10/12/2022    Follow-up provider for Plan of Care?: I treated the patient in an acute care facility and will not continue treatment after discharge.    Follow-up provider: JASMINE PEDRO [[id number]]    Reason/Clinical Findings: cancer    Describe mobility limitations that make leaving home difficult: impaired obility    Nursing/Therapeutic Services Requested: Physical Therapy Skilled Nursing    Skilled nursing orders: Medication education    PT orders: Gait Training Transfer training Strengthening    Weight Bearing Status: As Tolerated    Frequency: Other           Time Spent on Discharge:  Greater than 37 minutes      KORI Canas  [facility]  10/12/22  16:08 EDT

## 2022-10-12 NOTE — PLAN OF CARE
Goal Outcome Evaluation: Pt is alert. Room air. Ambulated with PT in [place]. Plan is to D/C home with home health and .

## 2022-10-12 NOTE — PROGRESS NOTES
[facility] GROUP INPATIENT PROGRESS NOTE    Length of Stay:  19 days    CHIEF COMPLAINT/REASON FOR VISIT:  Metastatic non-small cell lung cancer  Cancer-related pain    SUBJECTIVE:    Vital signs remained stable  Pain adequately controlled.  Patient is getting ready to be discharged home with home health.    ROS:  14 systems reviewed with pertinent positives and negatives in the HPI. Reviewed today.    OBJECTIVE:  Vitals:    10/11/22 1603 10/11/22 2306 10/12/22 0352 10/12/22 0711   BP: 109/72 109/76 124/77 95/76   BP Location: Left arm Left arm Left arm Left arm   Patient Position: Lying Lying Lying Lying   Pulse: 90 76 87 88   Resp: 18 18 17 18   Temp: 97.3 °F (36.3 °C) 97 °F (36.1 °C) 97.3 °F (36.3 °C) 97.1 °F (36.2 °C)   TempSrc: Oral Oral Oral Oral   SpO2:  96% 97% 98%   Weight:       Height:             PHYSICAL EXAMINATION:    General: NAD, alert, answers questions appropriately  HEENT: R scalp and deandre-orbital hematoma with some movement to the left deandre-orbital area, stable to improved again today  Chest/Lungs: CTAB. Right chest mediport in place  Heart: RRR  Abdomen/GI: soft, NT, ND, NABS  Extremities: WWP, no edema or tenderness    I have reexamined the patient and the results are consistent with the previously documented exam. Salma Snell MD       DIAGNOSTIC DATA:  Results Review:     I reviewed the patient's new clinical results.    Results from last 7 days   Lab Units 10/11/22  0519   WBC 10*3/mm3 7.81   HEMOGLOBIN g/dL 8.9*   HEMATOCRIT % 27.2*   PLATELETS 10*3/mm3 166     Lab Results   Component Value Date    NEUTROABS 9.11 (H) 10/08/2022     Results from last 7 days   Lab Units 10/11/22  0519   SODIUM mmol/L 141   POTASSIUM mmol/L 3.7   CHLORIDE mmol/L 106   CO2 mmol/L 24.0   BUN mg/dL 13   CREATININE mg/dL 0.82   GLUCOSE mg/dL 70   CALCIUM mg/dL 8.2*         Results from last 7 days   Lab Units 10/06/22  0557   MAGNESIUM mg/dL 2.1         IMAGING:    MRI Pelvis With & Without Contrast  (09/24/2022 17:26)  CT Head Without Contrast (10/04/2022 07:20)    ASSESSMENT:  This is a 57 y.o. female with:     *Metastatic non-small cell lung cancer  · She was initially diagnosed with stage III (T1N2M0) disease.    · She received initial therapy with cisplatin and Alimta concurrent with radiation therapy beginning 5/25/2021.    · There were indeterminate findings in the lumbar spine at L1 and it was recommended to monitor this over time.    · Patient completed 3 cycles cisplatin and Alimta 7/7/2021 and completed radiation therapy 7/12/2021.    · PET scan 8/6/2021 showed good response to treatment.    · MRI lumbar spine 10/1/2021 with increase in size of the L1 lesion.    · Biopsy of the L1 lesion on 10/14/2022 confirmed metastatic adenocarcinoma of lung origin, PD-L1 TPS 0.    · PET scan 10/26/2021 with involvement of left adrenal and L1/L2 only.    · Patient received radiation therapy to L1/L2 on 11/19/2021.  She also received radiation to the left adrenal gland.    · She initiated further systemic therapy on 11/22/2021 with Keytruda and Alimta.   · Follow-up PET scan 1/18/2022 with decrease in small left upper lobe pulmonary nodule, resolution of activity at L1/L2, no new sites of disease.    · Guardant 360 CT DNA level was monitored and was decreasing.    · PET scan 4/11/2022 with progression in left adrenal and L1.    · Maintained systemic therapy with Keytruda and Alimta and received additional radiation to left adrenal and L1.  Alimta however held since 5/10/2022 due to renal dysfunction.  Radiation completed to lumbar spine 7/14/2022.    · PET scan 7/18/2022 with multiple areas of progression of the spine and right sacrum.    · Guardant 360 analysis 7/20/2022 with no actionable mutations.    · Change in therapy to single agent palliative Taxotere initiated 7/26/2022.    · Altered mental status and febrile neutropenia requiring hospitalization 8/2 - 8/9/2022.  During that admission, MRI brain, cervical,  thoracic, lumbar spine performed 8/4/2022 in addition to CT cervical spine 8/5/2022.  There was evidence of C2 metastatic lesion with some dural enhancement, compression deformity at T7 with mild edema suggesting acute to subacute fracture, 5 mm T12 spinous process metastasis, multifocal disease in the lumbar spine and sacrum, largest involving sacral ala to the right 5 cm in transverse dimension.  Loss of height at L2 25%.  · Patient received cycle 3 Taxotere 9/6/2022 with Neulasta support.    · She has been continuing as well on Xgeva every 4 weeks (last received 9/6/2022).  · Patient did undergo MRI lumbar spine and pelvis on 9/24/2022.  MRI pelvis showed bilateral sacral fractures with marrow infiltration related to metastasis more prominent on the right.  Lesion on the right 3.5 x 4.1 x 4.3 cm.  Also probable metastasis along posterior acetabulum on the right 1.5 cm.  MRI lumbar spine with stable L1 metastasis, new edema endplate T12 possibly stress reaction versus developing new metastasis, lesion at T12 spinous process unchanged.   · Radiation oncology consulted with plans for palliative treatment of the right sacral metastasis.  Treatment initiated 9/28/2022  · She will complete radiation to the sacral metastasis on 10/11/2022.  · Following discharge she will undergo a PET/CT and depending upon the PET may resume Taxotere.  · Plan to DC home with home health     *History of bilateral stage I breast cancer  · Patient with bilateral breast cancer, both stage I ([id number]), grade 2, ER/AR positive and HER2/melinda negative with low Ki-67.    · Patient initiated adjuvant letrozole in May 2021.  · Continue letrozole daily     *Cancer related pain  · Patient has been receiving Duragesic patch 50 mcg every 72 hours in addition to oxycodone 15 mg every 4 hours for breakthrough pain.  Duragesic dose had been escalated recently in the outpatient setting due to worsening pain  · On admission 9/23/2022, Duragesic patch  increased to 75 mcg daily, added Decadron 4 mg IV every 6 hours with continuation of oxycodone 15 mg every 4 hours as needed for breakthrough pain in addition of Dilaudid 1 mg every 2 hours as needed for breakthrough pain.  · Patient with increasing side effects from narcotics, Duragesic decreased on 9/25/2022 from 75 down to 50 mcg patch every 72 hours.  · Dexamethasone dose decreased to 4 mg p.o. every 12 hours on 9/27/2022  · Initiated palliative radiation to the right sacrum on 9/28/2022  · Patient became confused after receiving Dilaudid.  Patient and family asked to discontinue Dilaudid.  · Duragesic patch dose was increased to 75 mcg/h on 9/30/2022. Subsequent decrease to 50 mcg due to confusion  · Gabapentin was discontinued due to the sedating effect.  · Oxycodone is being used for breakthrough pain.  · 10/3 transition to sustained release oxycodone 40 mg bid and fentanyl patch stopped  · Pain is well controlled     *Anxiety/depression  · Patient was previously on Wellbutrin  mg daily, Lexapro 10 mg daily.  · On 9/30/2022, she was switched from Lexapro to Cymbalta 30 mg daily.  · Atarax initiated for restlessness, and     *Insomnia  · Patient was previously on Xanax but reported that it was not working.  · Patient previously tried melatonin and it was not effective.  · She was placed on Restoril 15 mg nightly on 10/1/2022.  · The patient had 3 falls since last night.  · Its not clear if this is due to Restoril but since this happened after the change, we switched back to Xanax-Home dose of 0.5 mg as needed for sleep.  · Well-controlled on Xanax     *Anemia  · Most recent evaluation 8/3/2022 with iron 15, ferritin 276, iron saturation 5%, TIBC 328, folate greater than 20, B12 392  · Anemia felt to be related to malignancy/chronic disease and chemotherapy  · Hemoglobin has remained in the 8-10 range  · Hemoglobin remains to     *Peripheral neuropathy  · Patient was on gabapentin 300 mg twice  daily.  · Gabapentin was discontinued on 9/30/2022.  · Continue Cymbalta, neuropathy stable     *Narcotic induced constipation  · Patient is on Sybil-Colace 2 tablets twice daily with MiraLAX daily   · Having regular bowel     *GI prophylaxis  · Protonix 40 mg daily        *Elevated liver labs  · Atorvastatin was discontinued  · LFTs stable as of 10/9/2022    *BLE calf pain  · BLE venous duplex negative. Exam reassurring    *Fall with right scalp hematoma    *Deconditioning  · Continues to experience buckling of the knee  · Recommended skilled PT    *UTI  · Ucx with E coli, pansensitive  · Completed Rocephin  ·   RECOMMENDATIONS:  1. Completed radiation 10/11/2020 to  2. Continue sustained release oxycydone 40 mg q12h, added on 10/3 and fentanyl patch stopped.  Improved pain.  3. Cymbalta at 30 mg daily for neuropathy  4. Continue dexamethasone at 4 mg in the morning, will taper as output  5. Xanax qhs prn, for insomnia  6. Discharged home to follow-up with me after PET/CT result        Salma Snell MD

## 2022-10-12 NOTE — CASE MANAGEMENT/SOCIAL WORK
Continued Stay Note  [facility]     Patient Name: Holli Patel  MRN: [id number]  Today's Date: 10/12/2022    Admit Date: 9/23/2022    Plan: Home with home health and family support   Discharge Plan     Row Name 10/12/22 1315       Plan    Plan Comments Met with patient, sitter and Jelani Patel  [phone] at bedside to discuss discharge plans.  and patient would like any home health agency that would accept her insurance, and he want to know any additional costs that might be associated with her home health services.  [facility] can’t accept. Left message for [facility], [facility] and [facility].  Spoke with Geovanny with [employer], to find patient costs for wheelchair, rolling walker and BSC, patient will not be able to get both wheelchair and walker at the same time. Walker only covered by her insurance. Bedside commode covered by her insurance.  Family will transport. Will continue to monitor for new or changing discharge needs.  Zoya DUBON RN [facility]    Row Name 10/12/22 1300       Plan    Plan Home with home health and family support               Discharge Codes    No documentation.               Expected Discharge Date and Time     Expected Discharge Date Expected Discharge Time    Oct 13, 2022             Zoya Wild RN

## 2022-10-12 NOTE — PROGRESS NOTES
Nutrition Services    Patient Name:  Holli Patel  YOB: 1965  MRN: [id number]  Admit Date:  9/23/2022      Nutrition follow up.  Cardiac diet, nutritional supplement-boost ordered TID.  Patient reported appetite has greatly improved today. Consumed 100% of breakfast and lunch. Drinking boost.    Will continue to follow/monitor.     CLINICAL NUTRITION ASSESSMENT      Reason for Assessment Length of Stay     Diagnosis/Problem   Metastatic non-small cell lung cancer  Cancer-related pain   Medical/Surgical History Past Medical History:   Diagnosis Date   • Anxiety    • Clot     POSSIBLE BLOOD CLOT IN VENOUS ACCESS DEVICE-PT STATES WAS STARTED ON ELIQUIS    • Dyspnea on exertion    • Elevated cholesterol    • Frequent urination     DAY AND NIGHT   • SHAYY (generalized anxiety disorder)     RELATED HIGH BLOOD PRESSURE   • GERD (gastroesophageal reflux disease)    • High blood pressure     ANXIETY RELATED   • Hyperlipidemia    • Hypothyroidism    • Lung cancer (HCC)    • Lung nodule     LEFT   • Malignant neoplasm of upper-outer quadrant of right breast in female, estrogen receptor positive (HCC) 4/13/2021    PT STATES HAS BILAT BREAST CANCER   • Migraine    • PONV (postoperative nausea and vomiting)        Past Surgical History:   Procedure Laterality Date   • BREAST BIOPSY Bilateral 04/07/2021    Right Breast 10 o'clock & Left breast 10 o'clock 6 cm from nipple, [facility]   • CLOSED REDUCTION HIP DISLOCATION Left 4/30/2021    Procedure: BRONCHOSCOPY, LEFT VIDEO ASSITED THORACOSCOPY, ROBOTIC ASSSITED MEDIASTINAL LYMPHADENECTOMY WITH INTERCOSTAL NERVE BLOCKS;  Surgeon: Raphael Kerr III, MD;  Location: [facility];  Service: DaVinci;  Laterality: Left;   • COLONOSCOPY     • TUBAL ABDOMINAL LIGATION     • VENOUS ACCESS DEVICE (PORT) INSERTION Right 5/20/2021    Procedure: INSERTION VENOUS ACCESS DEVICE;  Surgeon: Raphael Kerr III, MD;  Location: [facility];  Service: Thoracic;  Laterality: Right;  "  • VENOUS ACCESS DEVICE (PORT) INSERTION Right 11/29/2021    Procedure: REVISION AND REPLACEMENT RIGHT SUBCLAVIAN VENOUS ACCESS PORT;  Surgeon: Raphael Kerr III, MD;  Location: [facility];  Service: Thoracic;  Laterality: Right;   • WRIST SURGERY Right         Encounter Information        Nutrition/Diet History:     Food Preferences:    Supplements:    Factors Affecting Intake: decreased appetite     Anthropometrics        Current Height  Current Weight  BMI kg/m2 Height: 160 cm (63\")  Weight: 65.4 kg (144 lb 2.9 oz) (10/10/22 0900)  Body mass index is 25.54 kg/m².       Admission Weight 72 kg    Ideal Body Weight (IBW) 52.4 kg    Usual Body Weight (UBW)    Weight Change/Trend        Weight History Wt Readings from Last 30 Encounters:   10/10/22 0900 65.4 kg (144 lb 2.9 oz)   10/06/22 0500 68.1 kg (150 lb 2.1 oz)   09/23/22 1900 72 kg (158 lb 11.7 oz)   09/13/22 0853 71.6 kg (157 lb 12.8 oz)   09/06/22 1237 73.9 kg (163 lb)   08/30/22 1318 70.9 kg (156 lb 6.4 oz)   08/23/22 1353 71.2 kg (157 lb)   08/23/22 0848 71.4 kg (157 lb 4.8 oz)   08/16/22 0857 71.7 kg (158 lb)   08/11/22 1555 72.6 kg (160 lb)   08/03/22 1212 76.9 kg (169 lb 8.5 oz)   08/03/22 0652 74.8 kg (165 lb)   08/04/22 0637 76.7 kg (169 lb)   08/04/22 0638 76.7 kg (169 lb)   08/04/22 0639 76.7 kg (169 lb)   08/04/22 0639 76.7 kg (169 lb)   08/02/22 1155 75.4 kg (166 lb 3.2 oz)   07/26/22 1008 78.3 kg (172 lb 9.6 oz)   07/25/22 1055 78 kg (172 lb)   07/19/22 1310 76.7 kg (169 lb 1.6 oz)   07/14/22 1122 75.7 kg (166 lb 12.8 oz)   07/12/22 1134 76 kg (167 lb 9.6 oz)   07/06/22 1141 75.5 kg (166 lb 6.4 oz)   06/28/22 1306 75.6 kg (166 lb 11.2 oz)   06/07/22 0910 75.2 kg (165 lb 11.2 oz)   06/03/22 1149 74.8 kg (165 lb)   06/01/22 1455 75.5 kg (166 lb 6.4 oz)   05/31/22 1351 75.2 kg (165 lb 12.8 oz)   05/25/22 1046 74.4 kg (164 lb)   05/10/22 0843 74.4 kg (164 lb)   04/19/22 0841 76.5 kg (168 lb 9.6 oz)   04/04/22 1250 77 kg (169 lb 12.8 oz) "   03/28/22 0826 77.9 kg (171 lb 11.2 oz)                 Tests/Procedures        Tests/Procedures Radiation Therapy     Labs       Pertinent Labs    Results from last 7 days   Lab Units 10/11/22  0519 10/09/22  0544 10/08/22  0647   SODIUM mmol/L 141 140 140   POTASSIUM mmol/L 3.7 4.0 3.1*   CHLORIDE mmol/L 106 109* 105   CO2 mmol/L 24.0 23.7 24.4   BUN mg/dL 13 14 15   CREATININE mg/dL 0.82 0.81 0.88   CALCIUM mg/dL 8.2* 8.5* 8.1*   BILIRUBIN mg/dL 0.2 0.3 0.4   ALK PHOS U/L 96 94 97   ALT (SGPT) U/L 89* 103* 106*   AST (SGOT) U/L 26 28 33*   GLUCOSE mg/dL 70 77 92     Results from last 7 days   Lab Units 10/11/22  0519 10/07/22  0517 10/06/22  0557   MAGNESIUM mg/dL  --   --  2.1   HEMOGLOBIN g/dL 8.9*   < > 9.9*   HEMATOCRIT % 27.2*   < > 30.2*   WBC 10*3/mm3 7.81   < > 12.00*    < > = values in this interval not displayed.     Results from last 7 days   Lab Units 10/11/22  0519 10/09/22  0544 10/08/22  0646 10/07/22  0517 10/06/22  0557   PLATELETS 10*3/mm3 166 170 167 164 184     COVID19   Date Value Ref Range Status   08/03/2022 Not Detected Not Detected - Ref. Range Final     Lab Results   Component Value Date    HGBA1C 5.20 08/04/2022          Medications           Scheduled Medications buPROPion XL, 150 mg, Oral, Daily  calcitriol, 1 mcg, Oral, Daily  dexamethasone, 4 mg, Oral, Daily With Breakfast  DULoxetine, 30 mg, Oral, Nightly  enoxaparin, 40 mg, Subcutaneous, Q24H  folic acid, 1,000 mcg, Oral, Daily  letrozole, 2.5 mg, Oral, Daily  levothyroxine, 25 mcg, Oral, Q AM  oxyCODONE, 40 mg, Oral, Q12H  pantoprazole, 40 mg, Oral, QAM  senna-docusate sodium, 2 tablet, Oral, BID   And  polyethylene glycol, 17 g, Oral, Daily  potassium & sodium phosphates, 2 packet, Oral, BID AC  sodium chloride, 10 mL, Intravenous, Q12H       Infusions     PRN Medications •  acetaminophen **OR** acetaminophen **OR** acetaminophen  •  ALPRAZolam  •  senna-docusate sodium **AND** polyethylene glycol **AND** bisacodyl **AND**  bisacodyl  •  heparin  •  hydrALAZINE  •  hydrOXYzine  •  magnesium sulfate **OR** magnesium sulfate in D5W 1g/100mL (PREMIX) **OR** magnesium sulfate  •  ondansetron  •  oxyCODONE  •  prochlorperazine  •  sodium chloride  •  sodium chloride     Physical Findings        Physical Appearance Generalized weakness     NFPE Not applicable   --  Edema  no edema   Gastrointestinal hypoactive bowel sounds, last bowel movement:10/10   Tubes/Drains none   Oral/Mouth Cavity WNL   Skin bruising   --  Current Nutrition Orders & Evaluation of Intake       Oral Nutrition     Food Allergies NKFA   Current PO Diet Diet Regular; Cardiac   Supplement Boost Plus, TID   PO Evaluation     Trending % PO Intake        --  PES STATEMENT / NUTRITION DIAGNOSIS      Nutrition Dx Problem  Problem: Inadequate Nutrient Intake  Etiology: Medical Diagnosis  Signs/Symptoms: Report of Minimal PO Intake    Comment:    --  NUTRITION INTERVENTION      Intervention Goal(s) Meet estimated needs         RD Intervention/Action Interview for preferences, Supplement provided, Encourage intake, Follow Tx Progress and Care plan reviewed         Prescription/Orders:       PO Diet       Supplements       Enteral Nutrition       Parenteral Nutrition    New Prescription Ordered?    --      Monitor/Evaluation Per protocol   Education Will instruct as appropriate   --    RD to follow per protocol.      Electronically signed by:  Deepti Unger RD  10/12/22 14:42 EDT

## 2022-10-12 NOTE — PLAN OF CARE
Goal Outcome Evaluation:  Plan of Care Reviewed With: patient           Outcome Evaluation: Pt able to maintain activity level with PT today. Able to ambulate 65' with rwx and CGA. Pt demonstrated no overt LOB or instances of knee buckling during session today; however, followed closely with chair during ambulation for safety. Pt remains at increased risk of falls due to ongoing weakness, impaired balance, and decreased endurance and will continue to benefit from PT to address impairments and increase independence with mobility.

## 2022-10-12 NOTE — PLAN OF CARE
"Goal Outcome Evaluation:           Progress: improving  Outcome Evaluation: Pt was found supine in bed, agreeable to OOB ax with OT this AM. She sits EOB with SBA, STS and fxl ambulation into bathroom using RW with CGA. Cues for safety and navigation. CGA for toilet TF and Clarissa for clothing mgt. Pt completes oral care standing at the sink with set-up/SBA and cues for sequencing. Pt reports legs \"feeling tired\" and returns to supine in bed, left with all needs in reach.  "

## 2022-10-12 NOTE — TELEPHONE ENCOUNTER
Caller: KARLA NIX WITH [facility]    Relationship: IN HOME HEALTH CARE SERVICES    Best call back number: [phone]    What orders are you requesting (i.e. lab or imaging): EVALUATION FOR NURSING, PHYSICAL THERAPY AND OCCUPATIONAL THERAPY    In what timeframe would the patient need to come in: ASAP, PATIENT WILL BE DISCHARGING SOON AND WILL NEED THIS ASAP    Where will you receive your lab/imaging services: IN HOME    Additional notes: PLEASE CALL KARLA NIX WITH [facility] WITH VERBAL ORDERS ASAP

## 2022-10-12 NOTE — PLAN OF CARE
Problem: Adult Inpatient Plan of Care  Goal: Plan of Care Review  Outcome: Ongoing, Progressing  Flowsheets (Taken 10/12/2022 0420)  Progress: no change  Outcome Evaluation: VSS, Up with assitx1 and walker to BSC. Oral PRN pain meds, atarax and xanax given.  and sitter at bedside.  Goal: Patient-Specific Goal (Individualized)  Outcome: Ongoing, Progressing  Goal: Absence of Hospital-Acquired Illness or Injury  Outcome: Ongoing, Progressing  Intervention: Identify and Manage Fall Risk  Recent Flowsheet Documentation  Taken 10/12/2022 0245 by Mary Craft, RN  Safety Promotion/Fall Prevention:   activity supervised   assistive device/personal items within reach   clutter free environment maintained   safety round/check completed  Taken 10/12/2022 0030 by Mary Craft RN  Safety Promotion/Fall Prevention:   activity supervised   assistive device/personal items within reach   clutter free environment maintained   safety round/check completed  Taken 10/12/2022 0029 by Mary Craft RN  Safety Promotion/Fall Prevention:   assistive device/personal items within reach   activity supervised   clutter free environment maintained   fall prevention program maintained   safety round/check completed  Taken 10/11/2022 2250 by Mary Craft RN  Safety Promotion/Fall Prevention:   activity supervised   assistive device/personal items within reach   clutter free environment maintained   fall prevention program maintained   safety round/check completed  Taken 10/11/2022 2000 by Mary Craft, RN  Safety Promotion/Fall Prevention:   activity supervised   assistive device/personal items within reach   clutter free environment maintained   fall prevention program maintained   nonskid shoes/slippers when out of bed   safety round/check completed  Intervention: Prevent Skin Injury  Recent Flowsheet Documentation  Taken 10/11/2022 2000 by Mary Craft, RN  Skin Protection:   adhesive use limited   tubing/devices free  from skin contact  Intervention: Prevent and Manage VTE (Venous Thromboembolism) Risk  Recent Flowsheet Documentation  Taken 10/11/2022 2000 by Mary Craft RN  Activity Management: activity adjusted per tolerance  VTE Prevention/Management:   bilateral   sequential compression devices off   patient refused intervention  Intervention: Prevent Infection  Recent Flowsheet Documentation  Taken 10/12/2022 0245 by Mary Craft RN  Infection Prevention:   environmental surveillance performed   hand hygiene promoted   rest/sleep promoted  Taken 10/12/2022 0030 by Mary Craft RN  Infection Prevention:   environmental surveillance performed   equipment surfaces disinfected   rest/sleep promoted  Taken 10/12/2022 0029 by Mary Craft RN  Infection Prevention:   hand hygiene promoted   rest/sleep promoted  Taken 10/11/2022 2250 by Mary Craft RN  Infection Prevention:   hand hygiene promoted   rest/sleep promoted  Taken 10/11/2022 2000 by Mary Craft RN  Infection Prevention:   environmental surveillance performed   single patient room provided   rest/sleep promoted  Goal: Optimal Comfort and Wellbeing  Outcome: Ongoing, Progressing  Intervention: Monitor Pain and Promote Comfort  Recent Flowsheet Documentation  Taken 10/11/2022 2000 by Mary Craft RN  Pain Management Interventions:   see MAR   quiet environment facilitated  Intervention: Provide Person-Centered Care  Recent Flowsheet Documentation  Taken 10/11/2022 2000 by Mary Craft RN  Trust Relationship/Rapport:   care explained   choices provided   emotional support provided   thoughts/feelings acknowledged  Goal: Readiness for Transition of Care  Outcome: Ongoing, Progressing     Problem: Fall Injury Risk  Goal: Absence of Fall and Fall-Related Injury  Outcome: Ongoing, Progressing  Intervention: Identify and Manage Contributors  Recent Flowsheet Documentation  Taken 10/12/2022 0245 by Mary Craft RN  Medication Review/Management:  medications reviewed  Taken 10/12/2022 0030 by Mary Craft RN  Medication Review/Management: medications reviewed  Taken 10/12/2022 0029 by Mary Craft RN  Medication Review/Management: medications reviewed  Taken 10/11/2022 2250 by Mary Craft RN  Medication Review/Management: medications reviewed  Taken 10/11/2022 2000 by Mary Craft RN  Medication Review/Management: medications reviewed  Intervention: Promote Injury-Free Environment  Recent Flowsheet Documentation  Taken 10/12/2022 0245 by Mary Craft RN  Safety Promotion/Fall Prevention:   activity supervised   assistive device/personal items within reach   clutter free environment maintained   safety round/check completed  Taken 10/12/2022 0030 by Mary Craft RN  Safety Promotion/Fall Prevention:   activity supervised   assistive device/personal items within reach   clutter free environment maintained   safety round/check completed  Taken 10/12/2022 0029 by Mary Craft RN  Safety Promotion/Fall Prevention:   assistive device/personal items within reach   activity supervised   clutter free environment maintained   fall prevention program maintained   safety round/check completed  Taken 10/11/2022 2250 by Mary Craft RN  Safety Promotion/Fall Prevention:   activity supervised   assistive device/personal items within reach   clutter free environment maintained   fall prevention program maintained   safety round/check completed  Taken 10/11/2022 2000 by Mary Craft RN  Safety Promotion/Fall Prevention:   activity supervised   assistive device/personal items within reach   clutter free environment maintained   fall prevention program maintained   nonskid shoes/slippers when out of bed   safety round/check completed     Problem: Skin Injury Risk Increased  Goal: Skin Health and Integrity  Outcome: Ongoing, Progressing  Intervention: Optimize Skin Protection  Recent Flowsheet Documentation  Taken 10/11/2022 2000 by Mary Craft  RN  Pressure Reduction Techniques: frequent weight shift encouraged  Pressure Reduction Devices: alternating pressure pump (ADD)  Skin Protection:   adhesive use limited   tubing/devices free from skin contact   Goal Outcome Evaluation:  Plan of Care Reviewed With: patient        Progress: no change  Outcome Evaluation: VSS, Up with assitx1 and walker to BSC. Oral PRN pain meds, atarax and xanax given.  and sitter at bedside.

## 2022-10-12 NOTE — NURSING NOTE
Pt had assisted fall this evening attempting to get out of bed.  was at bedside and caught pt as she was sliding out of bed, where she hit her knee on the ground. Pt assessed by RN, denies hitting her head. Pt intermittently confused. Bed alarm on, x3 side rails up, family to remain at bedside.

## 2022-10-13 ENCOUNTER — READMISSION MANAGEMENT (OUTPATIENT)
Dept: CALL CENTER | Facility: HOSPITAL | Age: 57
End: 2022-10-13

## 2022-10-13 VITALS
HEART RATE: 86 BPM | HEIGHT: 63 IN | RESPIRATION RATE: 18 BRPM | BODY MASS INDEX: 25.55 KG/M2 | WEIGHT: 144.18 LBS | SYSTOLIC BLOOD PRESSURE: 126 MMHG | OXYGEN SATURATION: 96 % | DIASTOLIC BLOOD PRESSURE: 88 MMHG | TEMPERATURE: 97 F

## 2022-10-13 PROCEDURE — 99232 SBSQ HOSP IP/OBS MODERATE 35: CPT | Performed by: INTERNAL MEDICINE

## 2022-10-13 PROCEDURE — 63710000001 DEXAMETHASONE PER 0.25 MG: Performed by: INTERNAL MEDICINE

## 2022-10-13 RX ADMIN — HYDROXYZINE HYDROCHLORIDE 25 MG: 25 TABLET ORAL at 05:28

## 2022-10-13 RX ADMIN — PANTOPRAZOLE SODIUM 40 MG: 40 TABLET, DELAYED RELEASE ORAL at 06:40

## 2022-10-13 RX ADMIN — DEXAMETHASONE 4 MG: 4 TABLET ORAL at 08:14

## 2022-10-13 RX ADMIN — OXYCODONE HYDROCHLORIDE 15 MG: 15 TABLET ORAL at 10:08

## 2022-10-13 RX ADMIN — LETROZOLE 2.5 MG: 2.5 TABLET, FILM COATED ORAL at 08:14

## 2022-10-13 RX ADMIN — Medication 1000 MCG: at 08:14

## 2022-10-13 RX ADMIN — LEVOTHYROXINE SODIUM 25 MCG: 0.03 TABLET ORAL at 06:40

## 2022-10-13 RX ADMIN — OXYCODONE HYDROCHLORIDE 15 MG: 15 TABLET ORAL at 05:28

## 2022-10-13 RX ADMIN — CALCITRIOL 1 MCG: 0.25 CAPSULE ORAL at 08:13

## 2022-10-13 RX ADMIN — OXYCODONE HYDROCHLORIDE 40 MG: 15 TABLET, FILM COATED, EXTENDED RELEASE ORAL at 09:04

## 2022-10-13 RX ADMIN — Medication 2 PACKET: at 08:14

## 2022-10-13 RX ADMIN — BUPROPION HYDROCHLORIDE 150 MG: 150 TABLET, EXTENDED RELEASE ORAL at 08:14

## 2022-10-13 NOTE — PLAN OF CARE
Goal Outcome Evaluation:  Plan of Care Reviewed With: patient        Progress: no change  VSS, A&O forgetful at times. Plan to d/c 10/13/22.  at bedside. Xanax and oral pain meds given PRN. Up with assist to restroom. Bed alarm set, X3 side rails, and call light within reach.

## 2022-10-13 NOTE — NURSING NOTE
Pt is alert. Room air. Ambulated to the bathroom x2. Plan is to D/C home with home health and . [facility] has been called and verified that they have her 36 mg pain medicine getting flilled today. Pt got her 9 am dose this morning not due for another til 9 pm tonight  and  confirms he has the 15 MG Prn dose at home already. She's due for another dose at 2:08p

## 2022-10-13 NOTE — CASE MANAGEMENT/SOCIAL WORK
Case Management Discharge Note      Final Note: Home with family assistance and [facility]. Spouse refused DME.  T.D, CSW    Provided Post Acute Provider List?: N/A  N/A Provider List Comment: Patient plans to discharge home  Provided Post Acute Provider Quality & Resource List?: N/A  N/A Quality & Resource List Comment: Patient plans to discharge home    Selected Continued Care - Discharged on 10/13/2022 Admission date: 9/23/2022 - Discharge disposition: Home-Health Care Svc    Destination    No services have been selected for the patient.              Durable Medical Equipment    No services have been selected for the patient.              Dialysis/Infusion    No services have been selected for the patient.              Home Medical Care     Service Provider Selected Services Address Phone Fax Patient Preferred    [facility] [address] [facility] [address] [address] [phone] --          Therapy    No services have been selected for the patient.              Community Resources    No services have been selected for the patient.              Community & DME    No services have been selected for the patient.                Selected Continued Care - Prior Encounters Includes continued care and service providers with selected services from prior encounters from 6/25/2022 to 10/13/2022    Discharged on 8/9/2022 Admission date: 8/3/2022 - Discharge disposition: Home-Health Care [facility]    Home Medical Care     Service Provider Selected Services Address Phone Fax Patient Preferred     [facility] [address] [facility] [address] [address] [phone] --                    Transportation Services  Private: Car    Final Discharge Disposition Code: 06 - home with home health care

## 2022-10-13 NOTE — PROGRESS NOTES
Discharge delayed due to availability of medication from retail pharmacy.  Patient remained stable for discharge.  Oncology is also okay with discharge.  Chart reviewed.  Plan unchanged.    Electronically signed by KORI Canas, 10/13/22, 4:15 PM EDT.

## 2022-10-13 NOTE — OUTREACH NOTE
Prep Survey    Flowsheet Row Responses   [facility] patient discharged from? [facility]   Is LACE score < 7 ? No   Emergency Room discharge w/ pulse ox? No   Eligibility [facility]   Date of Admission 09/23/22   Date of Discharge 10/13/22   Discharge Disposition Home-Health Care Svc   Discharge diagnosis Cancer related pain   Does the patient have one of the following disease processes/diagnoses(primary or secondary)? Other   Does the patient have Home health ordered? Yes   What is the [facility] health agency?  [facility]   Is there a DME ordered? Yes   What DME was ordered?  declined BSC and Walker   Prep survey completed? Yes          TIKI ROY - Registered Nurse

## 2022-10-13 NOTE — NURSING NOTE
Pt spouse and pt stated they do not want morning labs. Spouse expressed pt is a hard stick and does not want pt to have labs.

## 2022-10-13 NOTE — PROGRESS NOTES
[facility] INPATIENT PROGRESS NOTE    Length of Stay:  20 days    CHIEF COMPLAINT/REASON FOR VISIT:  Metastatic non-small cell lung cancer  Cancer-related pain    SUBJECTIVE:    Vital signs stable.  No new complaints this morning.  Intermittently forgetful.  Plan is to discharge home today to follow-up after PET/CT in the clinic.    ROS:  14 systems reviewed with pertinent positives and negatives in the HPI. Reviewed today.    OBJECTIVE:  Vitals:    10/12/22 0711 10/12/22 1929 10/13/22 0531 10/13/22 0725   BP: 95/76 111/79 124/93 126/88   BP Location: Left arm Left arm Left arm Left arm   Patient Position: Lying Lying Lying Lying   Pulse: 88 87 89 86   Resp: 18 18 17 18   Temp: 97.1 °F (36.2 °C) 98 °F (36.7 °C) 97.1 °F (36.2 °C) 97 °F (36.1 °C)   TempSrc: Oral Oral Oral Oral   SpO2: 98% 97% 94% 96%   Weight:       Height:             PHYSICAL EXAMINATION:    General: NAD, alert, answers questions appropriately  HEENT: R scalp and deandre-orbital hematoma with some movement to the left deandre-orbital area, stable to improved again today  Chest/Lungs: CTAB. Right chest mediport in place  Heart: RRR  Abdomen/GI: soft, NT, ND, NABS  Extremities: WWP, no edema or tenderness    I have reexamined the patient and the results are consistent with the previously documented exam. Salma Snell MD       DIAGNOSTIC DATA:  Results Review:     I reviewed the patient's new clinical results.    Results from last 7 days   Lab Units 10/11/22  0519   WBC 10*3/mm3 7.81   HEMOGLOBIN g/dL 8.9*   HEMATOCRIT % 27.2*   PLATELETS 10*3/mm3 166     Lab Results   Component Value Date    NEUTROABS 9.11 (H) 10/08/2022     Results from last 7 days   Lab Units 10/11/22  0519   SODIUM mmol/L 141   POTASSIUM mmol/L 3.7   CHLORIDE mmol/L 106   CO2 mmol/L 24.0   BUN mg/dL 13   CREATININE mg/dL 0.82   GLUCOSE mg/dL 70   CALCIUM mg/dL 8.2*                 IMAGING:    MRI Pelvis With & Without Contrast (09/24/2022 17:26)  CT Head Without  Contrast (10/04/2022 07:20)    ASSESSMENT:  This is a 57 y.o. female with:     *Metastatic non-small cell lung cancer  · She was initially diagnosed with stage III (T1N2M0) disease.    · She received initial therapy with cisplatin and Alimta concurrent with radiation therapy beginning 5/25/2021.    · There were indeterminate findings in the lumbar spine at L1 and it was recommended to monitor this over time.    · Patient completed 3 cycles cisplatin and Alimta 7/7/2021 and completed radiation therapy 7/12/2021.    · PET scan 8/6/2021 showed good response to treatment.    · MRI lumbar spine 10/1/2021 with increase in size of the L1 lesion.    · Biopsy of the L1 lesion on 10/14/2022 confirmed metastatic adenocarcinoma of lung origin, PD-L1 TPS 0.    · PET scan 10/26/2021 with involvement of left adrenal and L1/L2 only.    · Patient received radiation therapy to L1/L2 on 11/19/2021.  She also received radiation to the left adrenal gland.    · She initiated further systemic therapy on 11/22/2021 with Keytruda and Alimta.   · Follow-up PET scan 1/18/2022 with decrease in small left upper lobe pulmonary nodule, resolution of activity at L1/L2, no new sites of disease.    · Guardant 360 CT DNA level was monitored and was decreasing.    · PET scan 4/11/2022 with progression in left adrenal and L1.    · Maintained systemic therapy with Keytruda and Alimta and received additional radiation to left adrenal and L1.  Alimta however held since 5/10/2022 due to renal dysfunction.  Radiation completed to lumbar spine 7/14/2022.    · PET scan 7/18/2022 with multiple areas of progression of the spine and right sacrum.    · Guardant 360 analysis 7/20/2022 with no actionable mutations.    · Change in therapy to single agent palliative Taxotere initiated 7/26/2022.    · Altered mental status and febrile neutropenia requiring hospitalization 8/2 - 8/9/2022.  During that admission, MRI brain, cervical, thoracic, lumbar spine performed  8/4/2022 in addition to CT cervical spine 8/5/2022.  There was evidence of C2 metastatic lesion with some dural enhancement, compression deformity at T7 with mild edema suggesting acute to subacute fracture, 5 mm T12 spinous process metastasis, multifocal disease in the lumbar spine and sacrum, largest involving sacral ala to the right 5 cm in transverse dimension.  Loss of height at L2 25%.  · Patient received cycle 3 Taxotere 9/6/2022 with Neulasta support.    · She has been continuing as well on Xgeva every 4 weeks (last received 9/6/2022).  · Patient did undergo MRI lumbar spine and pelvis on 9/24/2022.  MRI pelvis showed bilateral sacral fractures with marrow infiltration related to metastasis more prominent on the right.  Lesion on the right 3.5 x 4.1 x 4.3 cm.  Also probable metastasis along posterior acetabulum on the right 1.5 cm.  MRI lumbar spine with stable L1 metastasis, new edema endplate T12 possibly stress reaction versus developing new metastasis, lesion at T12 spinous process unchanged.   · Radiation oncology consulted with plans for palliative treatment of the right sacral metastasis.  Treatment initiated 9/28/2022  · She will complete radiation to the sacral metastasis on 10/11/2022.  · PET/CT as an outpatient and follow-up in the clinic to determine further treatment     *History of bilateral stage I breast cancer  · Patient with bilateral breast cancer, both stage I ([id number]), grade 2, ER/AZ positive and HER2/melinda negative with low Ki-67.    · Patient initiated adjuvant letrozole in May 2021.  · Continue letrozole daily     *Cancer related pain  · Patient has been receiving Duragesic patch 50 mcg every 72 hours in addition to oxycodone 15 mg every 4 hours for breakthrough pain.  Duragesic dose had been escalated recently in the outpatient setting due to worsening pain  · On admission 9/23/2022, Duragesic patch increased to 75 mcg daily, added Decadron 4 mg IV every 6 hours with continuation of  oxycodone 15 mg every 4 hours as needed for breakthrough pain in addition of Dilaudid 1 mg every 2 hours as needed for breakthrough pain.  · Patient with increasing side effects from narcotics, Duragesic decreased on 9/25/2022 from 75 down to 50 mcg patch every 72 hours.  · Dexamethasone dose decreased to 4 mg p.o. every 12 hours on 9/27/2022  · Initiated palliative radiation to the right sacrum on 9/28/2022  · Patient became confused after receiving Dilaudid.  Patient and family asked to discontinue Dilaudid.  · Duragesic patch dose was increased to 75 mcg/h on 9/30/2022. Subsequent decrease to 50 mcg due to confusion  · Gabapentin was discontinued due to the sedating effect.  · Oxycodone is being used for breakthrough pain.  · 10/3 transition to sustained release oxycodone 40 mg bid and fentanyl patch stopped  · Pain     *Anxiety/depression  · Patient was previously on Wellbutrin  mg daily, Lexapro 10 mg daily.  · On 9/30/2022, she was switched from Lexapro to Cymbalta 30 mg daily.  · Atarax initiated for restlessness, and helping with symptom     *Insomnia  · Patient was previously on Xanax but reported that it was not working.  · Patient previously tried melatonin and it was not effective.  · She was placed on Restoril 15 mg nightly on 10/1/2022.  · The patient had 3 falls since last night.  · Its not clear if this is due to Restoril but since this happened after the change, we switched back to Xanax-Home dose of 0.5 mg as needed for sleep.  · Well-controlled on Xanax     *Anemia  · Most recent evaluation 8/3/2022 with iron 15, ferritin 276, iron saturation 5%, TIBC 328, folate greater than 20, B12 392  · Anemia felt to be related to malignancy/chronic disease and chemotherapy  · Hemoglobin has remained in the 8-10 range     *Peripheral neuropathy  · Patient was on gabapentin 300 mg twice daily.  · Gabapentin was discontinued on 9/30/2022.  · Continue Cymbalta, neuropathy stable     *Narcotic induced  constipation  · Patient is on Sybil-Colace 2 tablets twice daily with MiraLAX daily   · Having regular bowel     *GI prophylaxis  · Protonix 40 mg daily        *Elevated liver labs  · Atorvastatin was discontinued  · LFTs stable as of 10/9/2022    *BLE calf pain  · BLE venous duplex negative. Exam reassurring    *Fall with right scalp hematoma    *Deconditioning  · Continues to experience buckling of the knee  · Recommended skilled PT    *UTI  · Ucx with E coli, pansensitive  · Completed Rocephin  ·   RECOMMENDATIONS:  1. Completed radiation 10/11/2022  2. Continue sustained release oxycydone 40 mg q12h, added on 10/3 and fentanyl patch stopped.  Pain adequately controlled  3. Cymbalta at 30 mg daily for neuropathy, continue  4. Continue dexamethasone at 4 mg in the morning, will taper as output  5. Xanax qhs prn, for insomnia  6. Discharge home to follow-up in the office after repeat PET CT        Salma Snell MD

## 2022-10-14 ENCOUNTER — TRANSITIONAL CARE MANAGEMENT TELEPHONE ENCOUNTER (OUTPATIENT)
Dept: CALL CENTER | Facility: HOSPITAL | Age: 57
End: 2022-10-14

## 2022-10-14 DIAGNOSIS — C34.90 NON-SMALL CELL LUNG CANCER METASTATIC TO BONE: ICD-10-CM

## 2022-10-14 DIAGNOSIS — C79.51 NON-SMALL CELL LUNG CANCER METASTATIC TO BONE: ICD-10-CM

## 2022-10-14 DIAGNOSIS — Z87.19 HISTORY OF GASTROESOPHAGEAL REFLUX (GERD): Primary | ICD-10-CM

## 2022-10-14 RX ORDER — OXYCODONE HYDROCHLORIDE 15 MG/1
15 TABLET ORAL EVERY 4 HOURS PRN
Qty: 120 TABLET | Refills: 0 | Status: SHIPPED | OUTPATIENT
Start: 2022-10-14 | End: 2022-11-14 | Stop reason: SDUPTHER

## 2022-10-14 NOTE — OUTREACH NOTE
Call Center TCM Note    Flowsheet Row Responses   [facility] patient discharged from? [facility]   Does the patient have one of the following disease processes/diagnoses(primary or secondary)? Other   TCM attempt successful? Yes   Call start time 1218   Call end time 1223   Discharge diagnosis Cancer related pain   Person spoke with today (if not patient) and relationship  Jelani Patel, on speaker with patient   Meds reviewed with patient/caregiver? Yes   Does the patient have all medications ordered at discharge? Yes   Is the patient taking all medications as directed (includes completed medication regime)? Yes   Comments PCP Dr Gastelum. Declined to schedule PCP f/u with call today. Encouraged to use My Chart portal for communication with [facility] providers and also for telehealth appts.    What is the [facility] health agency?  [facility]   Has home health visited the patient within 72 hours of discharge? Call prior to 72 hours   What DME was ordered?  declined BSC and Walker   Psychosocial issues? No   Did the patient receive a copy of their discharge instructions? Yes   Nursing interventions Reviewed instructions with patient, Educated on MyChart  [Phone # given for My Chart staff to reset patients My Chart. ]   What is the patient's perception of their health status since discharge? Same   Is the patient/caregiver able to teach back signs and symptoms related to disease process for when to call PCP? Yes   Is the patient/caregiver able to teach back the hierarchy of who to call/visit for symptoms/problems? PCP, Specialist, Home health nurse, Urgent Care, ED, 911 Yes   If the patient is a current smoker, are they able to teach back resources for cessation? Not a smoker   TCM call completed? Yes          Gwen Bullock RN    10/14/2022, 12:25 EDT

## 2022-10-14 NOTE — OUTREACH NOTE
Call Center TCM Note    Flowsheet Row Responses   [facility] patient discharged from? [place]   Does the patient have one of the following disease processes/diagnoses(primary or secondary)? Other   TCM attempt successful? No   Unsuccessful attempts Attempt 1  [Outdated PCP verbal release]          Gwen Bullock RN    10/14/2022, 11:28 EDT

## 2022-10-16 NOTE — NURSING NOTE
This shift pt showed confusion and forgetfulness at times.  She was Alert and Oriented X2/3 mostly-- had remained at bedside and would attempt reorientation and correcting when she did not remember something--he assisted in her care. In the morning he left to run errands and stated he return about 8am. During the time he was gone, pt's external cath had leaked and linens needed to be changed.  Pt stated she needed to sit on bsc to have a bm--NA was changing the bed next to pt on bsc--pt leaned forward and fell into floor hitting head.  Immediately a knot formed on right forehead at brow.  Doctor was notified and STAT CT was ordered and completed,  was notified saying he would be back soon,  was also notified.    This happened very quickly--pt very anxious and impulsive.  Right eye had begun to darken prior to this ns leaving.  Sitter was put into place after this event.

## 2022-10-24 ENCOUNTER — TELEPHONE (OUTPATIENT)
Dept: ONCOLOGY | Facility: CLINIC | Age: 57
End: 2022-10-24

## 2022-10-24 NOTE — TELEPHONE ENCOUNTER
Received call from Jelani, he was asking for clarification if Holli should be taking the levothyroxine.  Advised him this is her thryoid medication and she should continue.  He voiced understanding.

## 2022-10-25 ENCOUNTER — HOSPITAL ENCOUNTER (OUTPATIENT)
Dept: PET IMAGING | Facility: HOSPITAL | Age: 57
Discharge: HOME | End: 2022-10-25

## 2022-10-25 ENCOUNTER — READMISSION MANAGEMENT (OUTPATIENT)
Dept: CALL CENTER | Facility: HOSPITAL | Age: 57
End: 2022-10-25

## 2022-10-25 DIAGNOSIS — C79.51 NON-SMALL CELL LUNG CANCER METASTATIC TO BONE: ICD-10-CM

## 2022-10-25 DIAGNOSIS — C34.90 NON-SMALL CELL LUNG CANCER METASTATIC TO BONE: ICD-10-CM

## 2022-10-25 DIAGNOSIS — C34.12 PRIMARY ADENOCARCINOMA OF UPPER LOBE OF LEFT LUNG: ICD-10-CM

## 2022-10-25 LAB — GLUCOSE BLDC GLUCOMTR-MCNC: 86 MG/DL (ref 70–130)

## 2022-10-25 PROCEDURE — A9552 F18 FDG: HCPCS | Performed by: INTERNAL MEDICINE

## 2022-10-25 PROCEDURE — 82962 GLUCOSE BLOOD TEST: CPT

## 2022-10-25 PROCEDURE — 78815 PET IMAGE W/CT SKULL-THIGH: CPT

## 2022-10-25 PROCEDURE — 0 FLUDEOXYGLUCOSE F18 SOLUTION: Performed by: INTERNAL MEDICINE

## 2022-10-25 RX ADMIN — FLUDEOXYGLUCOSE F18 1 DOSE: 300 INJECTION INTRAVENOUS at 07:29

## 2022-10-25 NOTE — OUTREACH NOTE
Medical Week 2 Survey    Flowsheet Row Responses   [facility] patient discharged from? [place]   Does the patient have one of the following disease processes/diagnoses(primary or secondary)? Other   Week 2 attempt successful? Yes   Call start time 1452   Discharge diagnosis Cancer related pain   Call end time 1456   Person spoke with today (if not patient) and relationship  Jelani Patel, on speaker with patient   Meds reviewed with patient/caregiver? Yes   Is the patient having any side effects they believe may be caused by any medication additions or changes? No   Does the patient have all medications ordered at discharge? Yes   Is the patient taking all medications as directed (includes completed medication regime)? Yes   Does the patient have a primary care provider?  Yes   Does the patient have an appointment with their PCP within 7 days of discharge? Yes   Has the patient kept scheduled appointments due by today? N/A   Has home health visited the patient within 72 hours of discharge? Yes   Home health comments Pt declined HH   Psychosocial issues? No   What is the patient's perception of their health status since discharge? Same   Week 2 Call Completed? Yes          MAXWELL BOOKER - Registered Nurse

## 2022-10-26 ENCOUNTER — OFFICE VISIT (OUTPATIENT)
Dept: ONCOLOGY | Facility: CLINIC | Age: 57
End: 2022-10-26

## 2022-10-26 ENCOUNTER — TELEPHONE (OUTPATIENT)
Dept: ONCOLOGY | Facility: CLINIC | Age: 57
End: 2022-10-26

## 2022-10-26 ENCOUNTER — LAB (OUTPATIENT)
Dept: LAB | Facility: HOSPITAL | Age: 57
End: 2022-10-26

## 2022-10-26 VITALS
SYSTOLIC BLOOD PRESSURE: 131 MMHG | WEIGHT: 144 LBS | BODY MASS INDEX: 25.52 KG/M2 | DIASTOLIC BLOOD PRESSURE: 95 MMHG | RESPIRATION RATE: 18 BRPM | TEMPERATURE: 97.3 F | HEIGHT: 63 IN | HEART RATE: 85 BPM | OXYGEN SATURATION: 97 %

## 2022-10-26 DIAGNOSIS — G89.3 CANCER RELATED PAIN: ICD-10-CM

## 2022-10-26 DIAGNOSIS — C79.51 NON-SMALL CELL LUNG CANCER METASTATIC TO BONE: Primary | ICD-10-CM

## 2022-10-26 DIAGNOSIS — C34.90 NON-SMALL CELL LUNG CANCER METASTATIC TO BONE: ICD-10-CM

## 2022-10-26 DIAGNOSIS — Z17.0 MALIGNANT NEOPLASM OF UPPER-OUTER QUADRANT OF RIGHT BREAST IN FEMALE, ESTROGEN RECEPTOR POSITIVE: ICD-10-CM

## 2022-10-26 DIAGNOSIS — C79.51 NON-SMALL CELL LUNG CANCER METASTATIC TO BONE: ICD-10-CM

## 2022-10-26 DIAGNOSIS — C34.90 NON-SMALL CELL LUNG CANCER METASTATIC TO BONE: Primary | ICD-10-CM

## 2022-10-26 DIAGNOSIS — C50.411 MALIGNANT NEOPLASM OF UPPER-OUTER QUADRANT OF RIGHT BREAST IN FEMALE, ESTROGEN RECEPTOR POSITIVE: ICD-10-CM

## 2022-10-26 LAB
ALBUMIN SERPL-MCNC: 4 G/DL (ref 3.5–5.2)
ALBUMIN/GLOB SERPL: 1.7 G/DL (ref 1.1–2.4)
ALP SERPL-CCNC: 106 U/L (ref 38–116)
ALT SERPL W P-5'-P-CCNC: 33 U/L (ref 0–33)
ANION GAP SERPL CALCULATED.3IONS-SCNC: 17.2 MMOL/L (ref 5–15)
AST SERPL-CCNC: 23 U/L (ref 0–32)
BASOPHILS # BLD AUTO: 0.01 10*3/MM3 (ref 0–0.2)
BASOPHILS NFR BLD AUTO: 0.2 % (ref 0–1.5)
BILIRUB SERPL-MCNC: 0.3 MG/DL (ref 0.2–1.2)
BUN SERPL-MCNC: 18 MG/DL (ref 6–20)
BUN/CREAT SERPL: 17.3 (ref 7.3–30)
CALCIUM SPEC-SCNC: 9.1 MG/DL (ref 8.5–10.2)
CHLORIDE SERPL-SCNC: 105 MMOL/L (ref 98–107)
CO2 SERPL-SCNC: 18.8 MMOL/L (ref 22–29)
CREAT SERPL-MCNC: 1.04 MG/DL (ref 0.6–1.1)
DEPRECATED RDW RBC AUTO: 66.8 FL (ref 37–54)
EGFRCR SERPLBLD CKD-EPI 2021: 62 ML/MIN/1.73
EOSINOPHIL # BLD AUTO: 0.03 10*3/MM3 (ref 0–0.4)
EOSINOPHIL NFR BLD AUTO: 0.5 % (ref 0.3–6.2)
ERYTHROCYTE [DISTWIDTH] IN BLOOD BY AUTOMATED COUNT: 18.6 % (ref 12.3–15.4)
GLOBULIN UR ELPH-MCNC: 2.4 GM/DL (ref 1.8–3.5)
GLUCOSE SERPL-MCNC: 92 MG/DL (ref 74–124)
HCT VFR BLD AUTO: 36.6 % (ref 34–46.6)
HGB BLD-MCNC: 11.1 G/DL (ref 12–15.9)
IMM GRANULOCYTES # BLD AUTO: 0.09 10*3/MM3 (ref 0–0.05)
IMM GRANULOCYTES NFR BLD AUTO: 1.6 % (ref 0–0.5)
LYMPHOCYTES # BLD AUTO: 0.45 10*3/MM3 (ref 0.7–3.1)
LYMPHOCYTES NFR BLD AUTO: 7.9 % (ref 19.6–45.3)
MCH RBC QN AUTO: 29.8 PG (ref 26.6–33)
MCHC RBC AUTO-ENTMCNC: 30.3 G/DL (ref 31.5–35.7)
MCV RBC AUTO: 98.1 FL (ref 79–97)
MONOCYTES # BLD AUTO: 0.39 10*3/MM3 (ref 0.1–0.9)
MONOCYTES NFR BLD AUTO: 6.8 % (ref 5–12)
NEUTROPHILS NFR BLD AUTO: 4.75 10*3/MM3 (ref 1.7–7)
NEUTROPHILS NFR BLD AUTO: 83 % (ref 42.7–76)
NRBC BLD AUTO-RTO: 0 /100 WBC (ref 0–0.2)
PLATELET # BLD AUTO: 102 10*3/MM3 (ref 140–450)
PMV BLD AUTO: 10.4 FL (ref 6–12)
POTASSIUM SERPL-SCNC: 4.6 MMOL/L (ref 3.5–4.7)
PROT SERPL-MCNC: 6.4 G/DL (ref 6.3–8)
RBC # BLD AUTO: 3.73 10*6/MM3 (ref 3.77–5.28)
SODIUM SERPL-SCNC: 141 MMOL/L (ref 134–145)
WBC NRBC COR # BLD: 5.72 10*3/MM3 (ref 3.4–10.8)

## 2022-10-26 PROCEDURE — 99215 OFFICE O/P EST HI 40 MIN: CPT | Performed by: INTERNAL MEDICINE

## 2022-10-26 PROCEDURE — 85025 COMPLETE CBC W/AUTO DIFF WBC: CPT

## 2022-10-26 PROCEDURE — 36415 COLL VENOUS BLD VENIPUNCTURE: CPT

## 2022-10-26 PROCEDURE — 80053 COMPREHEN METABOLIC PANEL: CPT

## 2022-10-26 NOTE — TELEPHONE ENCOUNTER
Reviewed dexamethasone taper instructions with Jelani.  He wrote these down and repeated them back to me.

## 2022-10-26 NOTE — PROGRESS NOTES
[facility] INPATIENT PROGRESS NOTE    Length of Stay:  0 days    CHIEF COMPLAINT/REASON FOR VISIT:  Metastatic non-small cell lung cancer  Bilateral breast cancer    Interval history  Marcelle presents to the clinic today for follow-up.  She has been using extended release oxycodone twice a day and has been trying to spread out the oxycodone immediate release as much as possible.  Pain is fairly well controlled as long as she is in the laying position however when she sits up or tries to walk pain is fairly uncontrolled.  She is also experiencing weakness in her lower extremities and feels unsteady.  She is having to use a walker to ambulate.  Appetite is good.      Oncologic history  Ms. 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     3/1/2021-bilateral screening mammogram-microcalcifications seen in the posterior one third of the lateral aspect of the right breast.  Area of focal asymmetry seen in the middle one third of the upper inner quadrant of the left breast.     3/1/2021-DEXA scan-T score of -2.2 on the lumbar spine and T score of -2.3 in the left hip and T score of -1.9 in the right hip.  Findings consistent with osteopenia     3/23/2021-screening lung CT-there is a 10 x 11 mm solid nodule in the left upper lobe.  Enlarged AP window lymph node measuring 14 x 10 mm.  Suggestion of a possible 9 mm left hilar lymph node.  Heavy coronary artery calcification.     3/23/2021-diagnostic mammogram bilateral-cluster of microcalcifications in the middle third lateral aspect of the right breast.  Left breast demonstrates persistence of the area of focal asymmetry in the region.     Ultrasound-left breast ultrasound at 10 o'clock position, 6 cm from the nipple there is a 0.4 cm irregular hypoechoic lesion.  Stereotactic biopsy of the right breast calcifications recommended.  Ultrasound-guided biopsy of the left breast  lesion recommended     4/7/2021-right breast stereotactic biopsy and left breast ultrasound-guided biopsy  Pathology  Right breast-invasive ductal carcinoma, grade 2, lymphovascular invasion present, ER +99% strong, NM +80% moderate, HER-2 negative, Ki-67 12%  Left breast upper inner quadrant 10 o'clock position biopsy, invasive ductal carcinoma, grade   2, ER +99% strong, NM +40% strong, HER-2 2+ on immunohistochemistry, nonamplified on FISH Ki-67 10%     4/14/2021-PET/CT-FDG avid aortopulmonary window lymph node measures 0.9 cm with an SUV of 7.5.  FDG avid left hilar lymph node measures 1 cm with an SUV of 17.2.  Irregular soft tissue density in the right breast measuring 3.7 x 3.8 cm is favored to be secondary to the recent breast biopsy.  1.1 cm pulmonary nodule within the left upper lobe with SUV 9.7 .  Sub-6 mm pulmonary nodules are present in the right lower lobe.  No evidence of lymphadenopathy or metastatic disease in the abdomen.  Focal area of FDG uptake within the central canal posterior to T11-T12 displaced demonstrating an SUV of 5.5 thought to be reactive however MRI is recommended for further evaluation.     She was seen by Dr. Molina who initially planned for breast surgery however  due to the PET abnormalities she has been referred to Dr. Kerr who plans to do a wound on 4/30/2021.  Dr. Molina's and Dr. Kerr's notes reviewed.     Patient denies any family history of breast cancer.  Her mother had lymphoma.  Maternal grandmother had colon cancer.  Prior to that screening mammogram she did not have any palpable abnormalities of either breast.     She has been a heavy smoker for about 40 years and smoked 2 packs/day.  Denies any recent weight changes, new bone pains, cough, abdominal pain nausea vomiting constipation or diarrhea.  She does have anxiety and has been on several medications.  She has recently been started on Xanax to help with the mood and also with insomnia.     Patient had a  video-assisted thoracoscopy and mediastinal lymphadenectomy on 4/30/2021.  L5-L6 lymph nodes were biopsied however the small pulmonary nodule in the left upper lobe was not difficult to find.  Pathology showed moderately differentiated adenocarcinoma which is CK7 and TTF-1 positive.  Consistent with lung primary.  Ki-67 85%.     5/14/2021-MRI of the brain-minimal chronic small vessel ischemic change in the white matter.  Otherwise negative MRI.     5/20/2021-bilateral axillary ultrasound-no evidence of axillary lymphadenopathy in either axilla.  Normal-appearing bilateral axillary lymph nodes visualized.     Cycle 1 cisplatin and Alimta on 5/25/2021.     Cycle 2 cisplatin Alimta 6/15/2021     Cycle 3 cisplatin Alimta 7/7/2021     Completed radiation on 7/12/2021     Port was nonfunctional hence a port study was performed which showed that the port was backed up into the innominate vein and also there was a nonocclusive thrombus at the end of the catheter extending into the superior vena cava.  She was started on anticoagulation with Eliquis.  It was recommended for port revision of the intention to keep the port.     PET/CT 8/5/2021-images independently reviewed and interpreted by me-decrease in size and FDG uptake of the left upper lobe pulmonary nodule as well as mediastinal and left hilar lymphadenopathy representing response to treatment.  Sub-6 mm pulmonary nodule in the left upper lobe new since 4/14/2021.  This could be related to radiation however follow-up CT in 3 months recommended.  Decrease in size of the irregular masslike tissue in the right breast which is postbiopsy hematoma.  This is not PET avid.  New segmental left eighth rib fractures healing.  A new band of sclerosis of the left seventh rib which favored to represent healing nondisplaced fracture.  Follow-up CT recommended.  focal uptake in the duodenum first and second portions.  Could be related to duodenitis.  Indeterminate lesion in the L1  vertebral body not well evaluated on PET/CT.     10/1/2021-MRI of the lumbar spine.  Lesion in the left posterior body of L1 measures 14 x 14 x 12 mm and previously 5 x 5 x 6 mm.  The interval enlargement strongly suggest that it is metastatic lesion.  No additional osseous metastasis noted.   Other chronic changes noted.     10/14/2021-biopsy of the lumbar spine lesion-pathology consistent with poorly differentiated carcinoma.  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     10/26/2021-brain MRI-no evidence of metastatic disease.     10/26/2021-bilateral diagnostic mammogram and ultrasound  Scattered fibroglandular density in both breast.  Postbiopsy hematoma with internal biopsy clip in the upper outer quadrant of the right breast, 8 cm from the nipple.  The hematoma size is decreased to 2.9 cm from 3.6 cm earlier.     Left breast-parenchymal density measuring 9 x 10 mm has markedly decreased.  Few adjacent calcifications which are unchanged.  No new abnormality in the left breast.  At 10:00 region there is some minimal adjacent hypoechoic texture which is difficult to measure but appears smaller since the previous exam.     10/28/2021-PET/CT  New L1 and L2 lytic bone metastasis annually intensely hypermetabolic focus at the left adrenal gland likely represents metastatic disease as well.  Slight decrease in size of the 0.9 x 0.7 cm left upper lobe pulmonary nodule.  There is hypermetabolic activity related to radiation pneumonitis.  The remainder of the nodule is photopenic.  Uncertain etiology of the more intense activity along the right lateral peripheral margin of the 2.6 cm postbiopsy density of the right breast.     She completed radiation to to the lumbar spine on 11/19/2021 11/22/2021-cycle 1 Alimta and Keytruda     3/23/2022-bilateral diagnostic mammogram and ultrasound  Complete resolution of the microcalcifications in  the upper outer quadrant of the right breast and decrease in size of the postbiopsy hematoma.  No suspicious abnormalities in the right breast.  Benign-appearing calcifications at the site of malignancy in the left breast upper inner quadrant.  No other suspicious findings.     4/11/2022 PET/CT-there is new groundglass opacities in the left upper lobe which are most likely postradiation changes.  Other groundglass opacities in the left lung as well as the right lung remained stable.  Increased nodularity of the left adrenal gland with an SUV of 5.6, previously 3.5.    Increased uptake in the L1 vertebra in the right posterior aspect with an SUV of 3.6.  Left L1 sclerosis increased     Due to this the case was discussed in multidisciplinary conference.  The disease progression was significant in the left adrenal gland as well as the L1 vertebra.  Since there were only 2 areas of disease progression it has been decided to proceed with radiation to these 2 spots and continue on Keytruda and Alimta.     Patient also has had worsening of her kidney function.  We initially held treatment as of 5/31/2022 and creatinine bumped up to 2.0, BUN 23.  We then resumed therapy 6/11/2022 with Keytruda alone.  Patient was referred to nephrology.     6/6/2022-MRI of the spine-diffuse marrow replacement in the L1 vertebral body and inferior endplate concavity.  Suspicious for metastatic disease with pathological fracture in the inferior endplate.  Also diffuse signal abnormality in the L2 lamina on the left suspicious for additional metastatic disease.  Abnormality of the first sacral segment suspicious for metastatic disease.  Left adrenal gland appears enlarged.  30 to 40% height loss and L2 vertebral body suggestive of a subacute compression fracture.  Degenerative changes.     7/18/2022-PET/CT  Multiple hypermetabolic osseous lesions with index lesions which have either increased in degree of FDG uptake or newly hypermetabolic  lesions representative of metastatic disease and progression of disease.  Possible FDG avid lesion in the left femoral diaphysis however these are incompletely visualized and in the area of artifactual FDG uptake.  MRI of the left femur recommended for further evaluation.  Increasing FDG uptake of the left adrenal gland.  Focal left hilar hypermetabolic him corresponds to the lymph node which has minimally increased in size compared to April 2022.  New sub-6 mm pulmonary nodules.     7/26/2022-cycle 1 of Taxotere     Patient was admitted to the hospital 8/3/2021 discharged on 8/9/2022.  She was admitted for strokelike symptoms and confusion.  The confusion was thought to be secondary to polypharmacy and probably febrile neutropenia.  She was treated with antibiotics.  Mental status improved subsequently.  MRI of the cervical thoracic and lumbar spine was performed on 8/4/2022.  Images independently reviewed by me.  Multiple metastatic lesions in the spine noted.  There was a lesion on the CT which was concerning.  There is also a sacral Lesion measuring up to 5 cm in transverse dimension on the right.  Patient was symptomatic with pain on the right side of the sacrum.  Radiation oncology recommended radiation to the cervical spine as well as sacrum.  CT head without any obvious abnormalities.     Completed radiation to the cervical spine and sacrum on 8/12/2022    She was admitted to the hospital between 9/23/2022 to 10/12/2022.  She was admitted for poorly controlled pain as well as encephalopathy and multiple falls.  She was diagnosed with a UTI and treated with IV Rocephin.  Pain medications changed and also additional radiation to the right sacrum was performed.  3000 cGy in 10 fractions was administered.  She was discharged on oxycodone 15 mg every 4 hours as needed for pain and OxyContin twice daily.  She was recommended to go to a subacute rehab however she preferred to be discharged home with home physical  therapy and Occupational Therapy    9/24/2022-MRI of the pelvis showed osseous metastatic disease in the sacrum worst within the right and probable pathological fractures bilaterally.    10/4/2022 CT head without any evidence of metastatic disease or acute abnormalities.    10/25/2022-PET/CT-resolution of hypermetabolic activity in the upper lobe and left hilum.  Previously noted new tiny pulmonary nodules have resolved.  Resolution of hypermetabolic activity in the left adrenal gland.  Near complete resolution of hypermetabolic activity in the skeletal metastasis particularly the cervical spine, pelvic bones and proximal left femur.  Maximal SUV at the right sacral metastasis is 2.7 and previously it was 8.9.  Tiny focus of hypermetabolic activity at the GE junction with a maximal SUV of 4.7.  No definite thickening or abnormality noted on the CT.    PHYSICAL EXAMINATION:    General: NAD, alert, answers questions appropriately  HEENT: Right periorbital hematoma still present.  Chest/Lungs: Clear to auscultation bilaterally. Right chest mediport in place  Heart: RRR  Abdomen/GI: soft, NT, ND, NABS  Extremities: Bilateral lower extremity    I have reexamined the patient and the results are consistent with the previously documented exam. Salma Snell MD       DIAGNOSTIC DATA:  Results Review:     I reviewed the patient's new clinical results.        Lab Results   Component Value Date    NEUTROABS 9.11 (H) 10/08/2022                     IMAGING:      PET/CT 10/25/2022-images independently reviewed and interpreted by me in detail summarized above    ASSESSMENT:  This is a 57 y.o. female with:     *Metastatic non-small cell lung cancer  · She was initially diagnosed with stage III (T1N2M0) disease.    · She received initial therapy with cisplatin and Alimta concurrent with radiation therapy beginning 5/25/2021.    · There were indeterminate findings in the lumbar spine at L1 and it was recommended to monitor this  over time.    · Patient completed 3 cycles cisplatin and Alimta 7/7/2021 and completed radiation therapy 7/12/2021.    · PET scan 8/6/2021 showed good response to treatment.    · MRI lumbar spine 10/1/2021 with increase in size of the L1 lesion.    · Biopsy of the L1 lesion on 10/14/2022 confirmed metastatic adenocarcinoma of lung origin, PD-L1 TPS 0.    · PET scan 10/26/2021 with involvement of left adrenal and L1/L2 only.    · Patient received radiation therapy to L1/L2 on 11/19/2021.  She also received radiation to the left adrenal gland.    · She initiated further systemic therapy on 11/22/2021 with Keytruda and Alimta.   · Follow-up PET scan 1/18/2022 with decrease in small left upper lobe pulmonary nodule, resolution of activity at L1/L2, no new sites of disease.    · Guardant 360 CT DNA level was monitored and was decreasing.    · PET scan 4/11/2022 with progression in left adrenal and L1.    · Maintained systemic therapy with Keytruda and Alimta and received additional radiation to left adrenal and L1.  Alimta however held since 5/10/2022 due to renal dysfunction.  Radiation completed to lumbar spine 7/14/2022.    · PET scan 7/18/2022 with multiple areas of progression of the spine and right sacrum.    · Guardant 360 analysis 7/20/2022 with no actionable mutations.    · Change in therapy to single agent palliative Taxotere initiated 7/26/2022.    · Altered mental status and febrile neutropenia requiring hospitalization 8/2 - 8/9/2022.  During that admission, MRI brain, cervical, thoracic, lumbar spine performed 8/4/2022 in addition to CT cervical spine 8/5/2022.  There was evidence of C2 metastatic lesion with some dural enhancement, compression deformity at T7 with mild edema suggesting acute to subacute fracture, 5 mm T12 spinous process metastasis, multifocal disease in the lumbar spine and sacrum, largest involving sacral ala to the right 5 cm in transverse dimension.  Loss of height at L2  25%.  · Patient received cycle 3 Taxotere 9/6/2022 with Neulasta support.    · She has been continuing as well on Xgeva every 4 weeks (last received 9/6/2022).  · Patient did undergo MRI lumbar spine and pelvis on 9/24/2022.  MRI pelvis showed bilateral sacral fractures with marrow infiltration related to metastasis more prominent on the right.  Lesion on the right 3.5 x 4.1 x 4.3 cm.  Also probable metastasis along posterior acetabulum on the right 1.5 cm.  MRI lumbar spine with stable L1 metastasis, new edema endplate T12 possibly stress reaction versus developing new metastasis, lesion at T12 spinous process unchanged.   · Radiation oncology consulted with plans for palliative treatment of the right sacral metastasis.  Treatment initiated 9/28/2022  · Completed radiation to the right sacral metastasis on 10/11/2022.  · PET/CT 10/25/2022 without any metabolic uptake in the lung or the skeletal metastasis.  This is indicative of a positive response to Taxotere.  · She continues to experience a fair amount of pain when she sits up or walks.  Pain is not present when she is in laying position.  · She also has some neuropathy in her lower extremities and has extreme weakness to the point where she is only able to ambulate some with a walker.  · We will hold off on chemotherapy today until her functional status improves some.     *History of bilateral stage I breast cancer  · Patient with bilateral breast cancer, both stage I ([id number]), grade 2, ER/PA positive and HER2/melinda negative with low Ki-67.    · Patient initiated adjuvant letrozole in May 2021.  · Continue letrozole daily     *Cancer related pain  · Patient has been receiving Duragesic patch 50 mcg every 72 hours in addition to oxycodone 15 mg every 4 hours for breakthrough pain.  Duragesic dose had been escalated recently in the outpatient setting due to worsening pain  · On admission 9/23/2022, Duragesic patch increased to 75 mcg daily, added Decadron 4 mg IV  every 6 hours with continuation of oxycodone 15 mg every 4 hours as needed for breakthrough pain in addition of Dilaudid 1 mg every 2 hours as needed for breakthrough pain.  · Patient with increasing side effects from narcotics, Duragesic decreased on 9/25/2022 from 75 down to 50 mcg patch every 72 hours.  · Dexamethasone dose decreased to 4 mg p.o. every 12 hours on 9/27/2022  · Initiated palliative radiation to the right sacrum on 9/28/2022  · Patient became confused after receiving Dilaudid.  Patient and family asked to discontinue Dilaudid.  · Duragesic patch dose was increased to 75 mcg/h on 9/30/2022. Subsequent decrease to 50 mcg due to confusion  · Gabapentin was discontinued due to the sedating effect.  · Oxycodone is being used for breakthrough pain.  · 10/3 transition to sustained release oxycodone 40 mg bid and fentanyl patch stopped  · Pain still present but mainly positional.  · She will be referred to pain management to consider a nerve block if amenable.     *Anxiety/depression  · Patient was previously on Wellbutrin  mg daily, Lexapro 10 mg daily.  · On 9/30/2022, she was switched from Lexapro to Cymbalta 30 mg daily.  · Atarax initiated for restlessness, and helping with symptom     *Insomnia  · Patient was previously on Xanax but reported that it was not working.  · Patient previously tried melatonin and it was not effective.  · She was placed on Restoril 15 mg nightly on 10/1/2022.  · The patient had 3 falls since last night.  · Its not clear if this is due to Restoril but since this happened after the change, we switched back to Xanax-Home dose of 0.5 mg as needed for sleep.  · She has been anxious regarding the PET/CT results.  She feels much better after learning about the PET/CT results today.  · Continue Xanax as needed     *Anemia  · Most recent evaluation 8/3/2022 with iron 15, ferritin 276, iron saturation 5%, TIBC 328, folate greater than 20, B12 392  · Anemia felt to be related to  malignancy/chronic disease and chemotherapy  · Hemoglobin better at 11.4     *Peripheral neuropathy  · Patient was on gabapentin 300 mg twice daily.  · Gabapentin was discontinued on 9/30/2022.  · Continue Cymbalta  · Neuropathy stable     *Narcotic induced constipation  · Patient is on Sybil-Colace 2 tablets twice daily with MiraLAX daily   · She is having regular bowel movements     *GI prophylaxis  · Protonix 40 mg daily        *Elevated liver labs  · Atorvastatin was discontinued  · LFTs stable 10/26/2022    *BLE calf pain  · BLE venous duplex negative. Exam reassurring    *Fall with right scalp hematoma  · Improving    *Deconditioning  · She was recommended to be discharged to subacute rehab however patient refused  · She is home and unable to afford home physical therapy  · Continues to do exercises with the help of her  and ambulating with a walker.  ·   RECOMMENDATIONS:  1. Completed radiation 10/11/2022  2. Continue sustained release oxycydone 40 mg q12h  3. Cymbalta at 30 mg daily for neuropathy, continue  4. Decrease dexamethasone to 2 mg daily  5. Xanax qhs prn, for insomnia  6. Follow-up in 2 weeks to administer Taxotere  7. Referred to pain management  55 minutes spent on the encounter including reviewing the PET/CT results, face-to-face time and documentation on the same day        Salma Snell MD

## 2022-11-03 ENCOUNTER — READMISSION MANAGEMENT (OUTPATIENT)
Dept: CALL CENTER | Facility: HOSPITAL | Age: 57
End: 2022-11-03

## 2022-11-03 NOTE — OUTREACH NOTE
Medical Week 3 Survey    Flowsheet Row Responses   [facility] patient discharged from? [place]   Does the patient have one of the following disease processes/diagnoses(primary or secondary)? Other   Week 3 attempt successful? Yes   Call start time 1430   Call end time 1432   Discharge diagnosis Cancer related pain   Person spoke with today (if not patient) and relationship Patient   Meds reviewed with patient/caregiver? Yes   Is the patient having any side effects they believe may be caused by any medication additions or changes? No   Does the patient have all medications ordered at discharge? Yes   Prescription comments No questions or concerns   Is the patient taking all medications as directed (includes completed medication regime)? Yes   Does the patient have a primary care provider?  Yes   Comments regarding PCP Patient following up [facility] oncology MD   Has the patient kept scheduled appointments due by today? N/A   What is the Home health agency?  [facility]   Home health comments Pt declined HH   Psychosocial issues? No   Did the patient receive a copy of their discharge instructions? Yes   Nursing interventions Reviewed instructions with patient   What is the patient's perception of their health status since discharge? Improving   Is the patient/caregiver able to teach back the hierarchy of who to call/visit for symptoms/problems? PCP, Specialist, Home health nurse, Urgent Care, ED, 911 Yes   If the patient is a current smoker, are they able to teach back resources for cessation? Not a smoker   Week 3 Call Completed? Yes   Graduated Yes   Did the patient feel the follow up calls were helpful during their recovery period? Yes   Was the number of calls appropriate? Yes   Graduated/Revoked comments Patient states she is improving with no cncerns at this time.           EMERITA HAN - Registered Nurse

## 2022-11-04 DIAGNOSIS — R29.818 NEUROLOGICAL DEFICIT PRESENT: ICD-10-CM

## 2022-11-04 DIAGNOSIS — C34.90 NON-SMALL CELL LUNG CANCER METASTATIC TO BONE: Primary | ICD-10-CM

## 2022-11-04 DIAGNOSIS — C50.411 MALIGNANT NEOPLASM OF UPPER-OUTER QUADRANT OF RIGHT BREAST IN FEMALE, ESTROGEN RECEPTOR POSITIVE: ICD-10-CM

## 2022-11-04 DIAGNOSIS — C79.51 NON-SMALL CELL LUNG CANCER METASTATIC TO BONE: Primary | ICD-10-CM

## 2022-11-04 DIAGNOSIS — R41.0 CONFUSION: ICD-10-CM

## 2022-11-04 DIAGNOSIS — Z17.0 MALIGNANT NEOPLASM OF UPPER-OUTER QUADRANT OF RIGHT BREAST IN FEMALE, ESTROGEN RECEPTOR POSITIVE: ICD-10-CM

## 2022-11-04 NOTE — PROGRESS NOTES
Dr Snell spoke with Jelani on the phone.  Plan to schedule MRI brain hopefully prior to follow up next Wednesday.  Also recommend home health referral for PT/OT. Orders/referral entered.

## 2022-11-07 ENCOUNTER — APPOINTMENT (OUTPATIENT)
Dept: MRI IMAGING | Facility: HOSPITAL | Age: 57
End: 2022-11-07

## 2022-11-07 ENCOUNTER — TELEPHONE (OUTPATIENT)
Dept: ONCOLOGY | Facility: CLINIC | Age: 57
End: 2022-11-07

## 2022-11-07 ENCOUNTER — HOME HEALTH ADMISSION (OUTPATIENT)
Dept: HOME HEALTH SERVICES | Facility: HOME HEALTHCARE | Age: 57
End: 2022-11-07

## 2022-11-07 DIAGNOSIS — C34.12 PRIMARY ADENOCARCINOMA OF UPPER LOBE OF LEFT LUNG: ICD-10-CM

## 2022-11-07 DIAGNOSIS — C34.90 NON-SMALL CELL LUNG CANCER METASTATIC TO ADRENAL GLAND: ICD-10-CM

## 2022-11-07 DIAGNOSIS — C79.70 NON-SMALL CELL LUNG CANCER METASTATIC TO ADRENAL GLAND: ICD-10-CM

## 2022-11-07 RX ORDER — OXYCODONE 36 MG/1
36 CAPSULE, EXTENDED RELEASE ORAL EVERY 12 HOURS
Qty: 60 EACH | Refills: 0 | Status: SHIPPED | OUTPATIENT
Start: 2022-11-07 | End: 2022-11-10

## 2022-11-07 RX ORDER — LETROZOLE 2.5 MG/1
2.5 TABLET, FILM COATED ORAL DAILY
Qty: 90 TABLET | Refills: 3 | Status: SHIPPED | OUTPATIENT
Start: 2022-11-07 | End: 2022-11-07

## 2022-11-07 RX ORDER — LETROZOLE 2.5 MG/1
2.5 TABLET, FILM COATED ORAL DAILY
Qty: 90 TABLET | Refills: 3 | Status: SHIPPED | OUTPATIENT
Start: 2022-11-07 | End: 2023-01-24 | Stop reason: SDUPTHER

## 2022-11-07 NOTE — TELEPHONE ENCOUNTER
Received call from Jelani this morning.  He reports again, ongoing confusion and Holli just not being herself.  He reports she has been in bed except to get up to use the restroom for three days. He states she doesn't know how to use her cell phone.  He also reports he assisted her to the bathroom to have a bowel movement, she couldn't wipe herself.  She told him she was finished but as he was going to help wipe her bottom she was still having a BM.  We have ordered MRI already, advised Jelani marni was working on getting that scheduled.  He requested open MRI if possible.  He also had follow up questions regarding her medications.  She is out of the PhosNak.  Advised him we will recheck her phos on Wednesday so we can hold off on refilling that for now.  He also inquired about continued use of Letrozole.  Reviewed this with Dr Snell, she should continue the letrozole for now.  He also states she needs a refill on the xtampza.  This will be sent to Dr Snell for signature.  He said when he spoke with Dr Snell on Friday she mentioned a referral to neurology.  Clarified with Dr Snell, will await MRI results prior to initiating a referral to neuro.  All of this was communicated to Jelani.

## 2022-11-09 ENCOUNTER — INFUSION (OUTPATIENT)
Dept: ONCOLOGY | Facility: HOSPITAL | Age: 57
End: 2022-11-09

## 2022-11-09 ENCOUNTER — OFFICE VISIT (OUTPATIENT)
Dept: ONCOLOGY | Facility: CLINIC | Age: 57
End: 2022-11-09

## 2022-11-09 VITALS
HEIGHT: 63 IN | SYSTOLIC BLOOD PRESSURE: 117 MMHG | TEMPERATURE: 97.4 F | HEART RATE: 127 BPM | BODY MASS INDEX: 25.02 KG/M2 | DIASTOLIC BLOOD PRESSURE: 91 MMHG | WEIGHT: 141.2 LBS | OXYGEN SATURATION: 97 %

## 2022-11-09 DIAGNOSIS — C79.51 NON-SMALL CELL LUNG CANCER METASTATIC TO BONE: Primary | ICD-10-CM

## 2022-11-09 DIAGNOSIS — R29.818 NEUROLOGICAL DEFICIT PRESENT: ICD-10-CM

## 2022-11-09 DIAGNOSIS — R41.0 CONFUSION: ICD-10-CM

## 2022-11-09 DIAGNOSIS — C79.51 NON-SMALL CELL LUNG CANCER METASTATIC TO BONE: ICD-10-CM

## 2022-11-09 DIAGNOSIS — R41.0 CONFUSION: Primary | ICD-10-CM

## 2022-11-09 DIAGNOSIS — C34.90 NON-SMALL CELL LUNG CANCER METASTATIC TO BONE: ICD-10-CM

## 2022-11-09 DIAGNOSIS — Z17.0 MALIGNANT NEOPLASM OF UPPER-OUTER QUADRANT OF RIGHT BREAST IN FEMALE, ESTROGEN RECEPTOR POSITIVE: ICD-10-CM

## 2022-11-09 DIAGNOSIS — Z45.2 ENCOUNTER FOR FITTING AND ADJUSTMENT OF VASCULAR CATHETER: ICD-10-CM

## 2022-11-09 DIAGNOSIS — C50.411 MALIGNANT NEOPLASM OF UPPER-OUTER QUADRANT OF RIGHT BREAST IN FEMALE, ESTROGEN RECEPTOR POSITIVE: ICD-10-CM

## 2022-11-09 DIAGNOSIS — C34.90 NON-SMALL CELL LUNG CANCER METASTATIC TO BONE: Primary | ICD-10-CM

## 2022-11-09 LAB
ALBUMIN SERPL-MCNC: 3.4 G/DL (ref 3.5–5.2)
ALBUMIN/GLOB SERPL: 1.2 G/DL (ref 1.1–2.4)
ALP SERPL-CCNC: 120 U/L (ref 38–116)
ALT SERPL W P-5'-P-CCNC: 12 U/L (ref 0–33)
ANION GAP SERPL CALCULATED.3IONS-SCNC: 13.8 MMOL/L (ref 5–15)
AST SERPL-CCNC: 9 U/L (ref 0–32)
BACTERIA UR QL AUTO: NEGATIVE /HPF
BASOPHILS # BLD AUTO: 0.03 10*3/MM3 (ref 0–0.2)
BASOPHILS NFR BLD AUTO: 0.5 % (ref 0–1.5)
BILIRUB SERPL-MCNC: 0.3 MG/DL (ref 0.2–1.2)
BILIRUB UR QL STRIP: ABNORMAL
BUN SERPL-MCNC: 12 MG/DL (ref 6–20)
BUN/CREAT SERPL: 11.5 (ref 7.3–30)
CALCIUM SPEC-SCNC: 8.6 MG/DL (ref 8.5–10.2)
CHLORIDE SERPL-SCNC: 103 MMOL/L (ref 98–107)
CLARITY UR: ABNORMAL
CO2 SERPL-SCNC: 22.2 MMOL/L (ref 22–29)
COLOR UR: YELLOW
CREAT SERPL-MCNC: 1.04 MG/DL (ref 0.6–1.1)
DEPRECATED RDW RBC AUTO: 56.8 FL (ref 37–54)
EGFRCR SERPLBLD CKD-EPI 2021: 62 ML/MIN/1.73
EOSINOPHIL # BLD AUTO: 0.02 10*3/MM3 (ref 0–0.4)
EOSINOPHIL NFR BLD AUTO: 0.3 % (ref 0.3–6.2)
ERYTHROCYTE [DISTWIDTH] IN BLOOD BY AUTOMATED COUNT: 15.8 % (ref 12.3–15.4)
GLOBULIN UR ELPH-MCNC: 2.9 GM/DL (ref 1.8–3.5)
GLUCOSE SERPL-MCNC: 125 MG/DL (ref 74–124)
GLUCOSE UR STRIP-MCNC: NEGATIVE MG/DL
HCT VFR BLD AUTO: 34.3 % (ref 34–46.6)
HGB BLD-MCNC: 10.5 G/DL (ref 12–15.9)
HGB UR QL STRIP.AUTO: NEGATIVE
HYALINE CASTS UR QL AUTO: ABNORMAL /LPF
IMM GRANULOCYTES # BLD AUTO: 0.03 10*3/MM3 (ref 0–0.05)
IMM GRANULOCYTES NFR BLD AUTO: 0.5 % (ref 0–0.5)
KETONES UR QL STRIP: NEGATIVE
LEUKOCYTE ESTERASE UR QL STRIP.AUTO: ABNORMAL
LYMPHOCYTES # BLD AUTO: 0.42 10*3/MM3 (ref 0.7–3.1)
LYMPHOCYTES NFR BLD AUTO: 6.8 % (ref 19.6–45.3)
MAGNESIUM SERPL-MCNC: 2 MG/DL (ref 1.8–2.5)
MCH RBC QN AUTO: 30 PG (ref 26.6–33)
MCHC RBC AUTO-ENTMCNC: 30.6 G/DL (ref 31.5–35.7)
MCV RBC AUTO: 98 FL (ref 79–97)
MONOCYTES # BLD AUTO: 0.64 10*3/MM3 (ref 0.1–0.9)
MONOCYTES NFR BLD AUTO: 10.3 % (ref 5–12)
NEUTROPHILS NFR BLD AUTO: 5.06 10*3/MM3 (ref 1.7–7)
NEUTROPHILS NFR BLD AUTO: 81.6 % (ref 42.7–76)
NITRITE UR QL STRIP: NEGATIVE
NRBC BLD AUTO-RTO: 0 /100 WBC (ref 0–0.2)
PH UR STRIP.AUTO: 6.5 [PH] (ref 4.5–8)
PHOSPHATE SERPL-MCNC: 2.2 MG/DL (ref 2.5–4.5)
PLATELET # BLD AUTO: 106 10*3/MM3 (ref 140–450)
PMV BLD AUTO: 9 FL (ref 6–12)
POTASSIUM SERPL-SCNC: 3.4 MMOL/L (ref 3.5–4.7)
PROT SERPL-MCNC: 6.3 G/DL (ref 6.3–8)
PROT UR QL STRIP: ABNORMAL
RBC # BLD AUTO: 3.5 10*6/MM3 (ref 3.77–5.28)
RBC # UR STRIP: ABNORMAL /HPF
REF LAB TEST METHOD: ABNORMAL
SODIUM SERPL-SCNC: 139 MMOL/L (ref 134–145)
SP GR UR STRIP: 1.02 (ref 1–1.03)
SQUAMOUS #/AREA URNS HPF: ABNORMAL /HPF
UROBILINOGEN UR QL STRIP: ABNORMAL
WBC # UR STRIP: ABNORMAL /HPF
WBC NRBC COR # BLD: 6.2 10*3/MM3 (ref 3.4–10.8)

## 2022-11-09 PROCEDURE — 81001 URINALYSIS AUTO W/SCOPE: CPT

## 2022-11-09 PROCEDURE — 80053 COMPREHEN METABOLIC PANEL: CPT

## 2022-11-09 PROCEDURE — 99215 OFFICE O/P EST HI 40 MIN: CPT | Performed by: INTERNAL MEDICINE

## 2022-11-09 PROCEDURE — 96360 HYDRATION IV INFUSION INIT: CPT

## 2022-11-09 PROCEDURE — 25010000002 HEPARIN LOCK FLUSH PER 10 UNITS: Performed by: INTERNAL MEDICINE

## 2022-11-09 PROCEDURE — 85025 COMPLETE CBC W/AUTO DIFF WBC: CPT

## 2022-11-09 PROCEDURE — 84100 ASSAY OF PHOSPHORUS: CPT

## 2022-11-09 PROCEDURE — 83735 ASSAY OF MAGNESIUM: CPT

## 2022-11-09 PROCEDURE — 96361 HYDRATE IV INFUSION ADD-ON: CPT

## 2022-11-09 RX ORDER — HEPARIN SODIUM (PORCINE) LOCK FLUSH IV SOLN 100 UNIT/ML 100 UNIT/ML
500 SOLUTION INTRAVENOUS AS NEEDED
Status: CANCELLED | OUTPATIENT
Start: 2022-11-09

## 2022-11-09 RX ORDER — HEPARIN SODIUM (PORCINE) LOCK FLUSH IV SOLN 100 UNIT/ML 100 UNIT/ML
500 SOLUTION INTRAVENOUS AS NEEDED
Status: DISCONTINUED | OUTPATIENT
Start: 2022-11-09 | End: 2022-11-09 | Stop reason: HOSPADM

## 2022-11-09 RX ORDER — SODIUM CHLORIDE 0.9 % (FLUSH) 0.9 %
10 SYRINGE (ML) INJECTION AS NEEDED
Status: DISCONTINUED | OUTPATIENT
Start: 2022-11-09 | End: 2022-11-09 | Stop reason: HOSPADM

## 2022-11-09 RX ORDER — SODIUM CHLORIDE 0.9 % (FLUSH) 0.9 %
10 SYRINGE (ML) INJECTION AS NEEDED
Status: CANCELLED | OUTPATIENT
Start: 2022-11-09

## 2022-11-09 RX ADMIN — Medication 10 ML: at 16:00

## 2022-11-09 RX ADMIN — SODIUM CHLORIDE, PRESERVATIVE FREE 500 UNITS: 5 INJECTION INTRAVENOUS at 16:00

## 2022-11-09 RX ADMIN — SODIUM CHLORIDE 1000 ML: 9 INJECTION, SOLUTION INTRAVENOUS at 14:28

## 2022-11-09 NOTE — H&P (VIEW-ONLY)
[facility] INPATIENT PROGRESS NOTE    Length of Stay:  0 days    CHIEF COMPLAINT/REASON FOR VISIT:  Metastatic non-small cell lung cancer  Bilateral breast cancer    Interval history  Marcelle presents to the clinic today for follow-up.  We have had multiple phone calls from her  regarding patient being confused and mental status deteriorating and getting progressively weak.  She is evaluated in the clinic today for the same.  She reports significant pain when she is in the sitting position.  Therefore she has to stay laying in the bed.  Her  Mr. Bettencourt reports that she has been sleeping several hours in the day and not very active at all.  She also seems to be forgetful at times and confused intermittently.  No nausea or vomiting.  She has had very little p.o. intake for the past 2 days due to excessive sleeping.      Oncologic history  Ms. 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     3/1/2021-bilateral screening mammogram-microcalcifications seen in the posterior one third of the lateral aspect of the right breast.  Area of focal asymmetry seen in the middle one third of the upper inner quadrant of the left breast.     3/1/2021-DEXA scan-T score of -2.2 on the lumbar spine and T score of -2.3 in the left hip and T score of -1.9 in the right hip.  Findings consistent with osteopenia     3/23/2021-screening lung CT-there is a 10 x 11 mm solid nodule in the left upper lobe.  Enlarged AP window lymph node measuring 14 x 10 mm.  Suggestion of a possible 9 mm left hilar lymph node.  Heavy coronary artery calcification.     3/23/2021-diagnostic mammogram bilateral-cluster of microcalcifications in the middle third lateral aspect of the right breast.  Left breast demonstrates persistence of the area of focal asymmetry in the region.     Ultrasound-left breast ultrasound at 10 o'clock position, 6 cm from  the nipple there is a 0.4 cm irregular hypoechoic lesion.  Stereotactic biopsy of the right breast calcifications recommended.  Ultrasound-guided biopsy of the left breast lesion recommended     4/7/2021-right breast stereotactic biopsy and left breast ultrasound-guided biopsy  Pathology  Right breast-invasive ductal carcinoma, grade 2, lymphovascular invasion present, ER +99% strong, NH +80% moderate, HER-2 negative, Ki-67 12%  Left breast upper inner quadrant 10 o'clock position biopsy, invasive ductal carcinoma, grade   2, ER +99% strong, NH +40% strong, HER-2 2+ on immunohistochemistry, nonamplified on FISH Ki-67 10%     4/14/2021-PET/CT-FDG avid aortopulmonary window lymph node measures 0.9 cm with an SUV of 7.5.  FDG avid left hilar lymph node measures 1 cm with an SUV of 17.2.  Irregular soft tissue density in the right breast measuring 3.7 x 3.8 cm is favored to be secondary to the recent breast biopsy.  1.1 cm pulmonary nodule within the left upper lobe with SUV 9.7 .  Sub-6 mm pulmonary nodules are present in the right lower lobe.  No evidence of lymphadenopathy or metastatic disease in the abdomen.  Focal area of FDG uptake within the central canal posterior to T11-T12 displaced demonstrating an SUV of 5.5 thought to be reactive however MRI is recommended for further evaluation.     She was seen by Dr. Molina who initially planned for breast surgery however  due to the PET abnormalities she has been referred to Dr. Kerr who plans to do a wound on 4/30/2021.  Dr. Molina's and Dr. Kerr's notes reviewed.     Patient denies any family history of breast cancer.  Her mother had lymphoma.  Maternal grandmother had colon cancer.  Prior to that screening mammogram she did not have any palpable abnormalities of either breast.     She has been a heavy smoker for about 40 years and smoked 2 packs/day.  Denies any recent weight changes, new bone pains, cough, abdominal pain nausea vomiting constipation or  diarrhea.  She does have anxiety and has been on several medications.  She has recently been started on Xanax to help with the mood and also with insomnia.     Patient had a video-assisted thoracoscopy and mediastinal lymphadenectomy on 4/30/2021.  L5-L6 lymph nodes were biopsied however the small pulmonary nodule in the left upper lobe was not difficult to find.  Pathology showed moderately differentiated adenocarcinoma which is CK7 and TTF-1 positive.  Consistent with lung primary.  Ki-67 85%.     5/14/2021-MRI of the brain-minimal chronic small vessel ischemic change in the white matter.  Otherwise negative MRI.     5/20/2021-bilateral axillary ultrasound-no evidence of axillary lymphadenopathy in either axilla.  Normal-appearing bilateral axillary lymph nodes visualized.     Cycle 1 cisplatin and Alimta on 5/25/2021.     Cycle 2 cisplatin Alimta 6/15/2021     Cycle 3 cisplatin Alimta 7/7/2021     Completed radiation on 7/12/2021     Port was nonfunctional hence a port study was performed which showed that the port was backed up into the innominate vein and also there was a nonocclusive thrombus at the end of the catheter extending into the superior vena cava.  She was started on anticoagulation with Eliquis.  It was recommended for port revision of the intention to keep the port.     PET/CT 8/5/2021-images independently reviewed and interpreted by me-decrease in size and FDG uptake of the left upper lobe pulmonary nodule as well as mediastinal and left hilar lymphadenopathy representing response to treatment.  Sub-6 mm pulmonary nodule in the left upper lobe new since 4/14/2021.  This could be related to radiation however follow-up CT in 3 months recommended.  Decrease in size of the irregular masslike tissue in the right breast which is postbiopsy hematoma.  This is not PET avid.  New segmental left eighth rib fractures healing.  A new band of sclerosis of the left seventh rib which favored to represent healing  nondisplaced fracture.  Follow-up CT recommended.  focal uptake in the duodenum first and second portions.  Could be related to duodenitis.  Indeterminate lesion in the L1 vertebral body not well evaluated on PET/CT.     10/1/2021-MRI of the lumbar spine.  Lesion in the left posterior body of L1 measures 14 x 14 x 12 mm and previously 5 x 5 x 6 mm.  The interval enlargement strongly suggest that it is metastatic lesion.  No additional osseous metastasis noted.   Other chronic changes noted.     10/14/2021-biopsy of the lumbar spine lesion-pathology consistent with poorly differentiated carcinoma.  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     10/26/2021-brain MRI-no evidence of metastatic disease.     10/26/2021-bilateral diagnostic mammogram and ultrasound  Scattered fibroglandular density in both breast.  Postbiopsy hematoma with internal biopsy clip in the upper outer quadrant of the right breast, 8 cm from the nipple.  The hematoma size is decreased to 2.9 cm from 3.6 cm earlier.     Left breast-parenchymal density measuring 9 x 10 mm has markedly decreased.  Few adjacent calcifications which are unchanged.  No new abnormality in the left breast.  At 10:00 region there is some minimal adjacent hypoechoic texture which is difficult to measure but appears smaller since the previous exam.     10/28/2021-PET/CT  New L1 and L2 lytic bone metastasis annually intensely hypermetabolic focus at the left adrenal gland likely represents metastatic disease as well.  Slight decrease in size of the 0.9 x 0.7 cm left upper lobe pulmonary nodule.  There is hypermetabolic activity related to radiation pneumonitis.  The remainder of the nodule is photopenic.  Uncertain etiology of the more intense activity along the right lateral peripheral margin of the 2.6 cm postbiopsy density of the right breast.     She completed radiation to to the lumbar  spine on 11/19/2021 11/22/2021-cycle 1 Alimta and Keytruda     3/23/2022-bilateral diagnostic mammogram and ultrasound  Complete resolution of the microcalcifications in the upper outer quadrant of the right breast and decrease in size of the postbiopsy hematoma.  No suspicious abnormalities in the right breast.  Benign-appearing calcifications at the site of malignancy in the left breast upper inner quadrant.  No other suspicious findings.     4/11/2022 PET/CT-there is new groundglass opacities in the left upper lobe which are most likely postradiation changes.  Other groundglass opacities in the left lung as well as the right lung remained stable.  Increased nodularity of the left adrenal gland with an SUV of 5.6, previously 3.5.    Increased uptake in the L1 vertebra in the right posterior aspect with an SUV of 3.6.  Left L1 sclerosis increased     Due to this the case was discussed in multidisciplinary conference.  The disease progression was significant in the left adrenal gland as well as the L1 vertebra.  Since there were only 2 areas of disease progression it has been decided to proceed with radiation to these 2 spots and continue on Keytruda and Alimta.     Patient also has had worsening of her kidney function.  We initially held treatment as of 5/31/2022 and creatinine bumped up to 2.0, BUN 23.  We then resumed therapy 6/11/2022 with Keytruda alone.  Patient was referred to nephrology.     6/6/2022-MRI of the spine-diffuse marrow replacement in the L1 vertebral body and inferior endplate concavity.  Suspicious for metastatic disease with pathological fracture in the inferior endplate.  Also diffuse signal abnormality in the L2 lamina on the left suspicious for additional metastatic disease.  Abnormality of the first sacral segment suspicious for metastatic disease.  Left adrenal gland appears enlarged.  30 to 40% height loss and L2 vertebral body suggestive of a subacute compression  fracture.  Degenerative changes.     7/18/2022-PET/CT  Multiple hypermetabolic osseous lesions with index lesions which have either increased in degree of FDG uptake or newly hypermetabolic lesions representative of metastatic disease and progression of disease.  Possible FDG avid lesion in the left femoral diaphysis however these are incompletely visualized and in the area of artifactual FDG uptake.  MRI of the left femur recommended for further evaluation.  Increasing FDG uptake of the left adrenal gland.  Focal left hilar hypermetabolic him corresponds to the lymph node which has minimally increased in size compared to April 2022.  New sub-6 mm pulmonary nodules.     7/26/2022-cycle 1 of Taxotere     Patient was admitted to the hospital 8/3/2021 discharged on 8/9/2022.  She was admitted for strokelike symptoms and confusion.  The confusion was thought to be secondary to polypharmacy and probably febrile neutropenia.  She was treated with antibiotics.  Mental status improved subsequently.  MRI of the cervical thoracic and lumbar spine was performed on 8/4/2022.  Images independently reviewed by me.  Multiple metastatic lesions in the spine noted.  There was a lesion on the CT which was concerning.  There is also a sacral Lesion measuring up to 5 cm in transverse dimension on the right.  Patient was symptomatic with pain on the right side of the sacrum.  Radiation oncology recommended radiation to the cervical spine as well as sacrum.  CT head without any obvious abnormalities.     Completed radiation to the cervical spine and sacrum on 8/12/2022    She was admitted to the hospital between 9/23/2022 to 10/12/2022.  She was admitted for poorly controlled pain as well as encephalopathy and multiple falls.  She was diagnosed with a UTI and treated with IV Rocephin.  Pain medications changed and also additional radiation to the right sacrum was performed.  3000 cGy in 10 fractions was administered.  She was discharged  on oxycodone 15 mg every 4 hours as needed for pain and OxyContin twice daily.  She was recommended to go to a subacute rehab however she preferred to be discharged home with home physical therapy and Occupational Therapy    9/24/2022-MRI of the pelvis showed osseous metastatic disease in the sacrum worst within the right and probable pathological fractures bilaterally.    10/4/2022 CT head without any evidence of metastatic disease or acute abnormalities.    10/25/2022-PET/CT-resolution of hypermetabolic activity in the upper lobe and left hilum.  Previously noted new tiny pulmonary nodules have resolved.  Resolution of hypermetabolic activity in the left adrenal gland.  Near complete resolution of hypermetabolic activity in the skeletal metastasis particularly the cervical spine, pelvic bones and proximal left femur.  Maximal SUV at the right sacral metastasis is 2.7 and previously it was 8.9.  Tiny focus of hypermetabolic activity at the GE junction with a maximal SUV of 4.7.  No definite thickening or abnormality noted on the CT.    PHYSICAL EXAMINATION:    General: NAD, alert, answers questions appropriately  HEENT: Right periorbital hematoma still present.  Chest/Lungs: Clear to auscultation bilaterally. Right chest mediport in place  Heart: RRR  Abdomen/GI: soft, NT, ND, NABS  Extremities: Bilateral lower extremity without any swelling.  However decree strength in both lower extremities.    I have reexamined the patient and the results are consistent with the previously documented exam. Salma Snell MD       DIAGNOSTIC DATA:  Results Review:     I reviewed the patient's new clinical results.    Results from last 7 days   Lab Units 11/09/22  1202   WBC 10*3/mm3 6.20   HEMOGLOBIN g/dL 10.5*   HEMATOCRIT % 34.3   PLATELETS 10*3/mm3 106*     Lab Results   Component Value Date    NEUTROABS 5.06 11/09/2022     Results from last 7 days   Lab Units 11/09/22  1202   SODIUM mmol/L 139   POTASSIUM mmol/L 3.4*    CHLORIDE mmol/L 103   CO2 mmol/L 22.2   BUN mg/dL 12   CREATININE mg/dL 1.04   GLUCOSE mg/dL 125*   CALCIUM mg/dL 8.6         Results from last 7 days   Lab Units 11/09/22  1202   MAGNESIUM mg/dL 2.0         IMAGING:      PET/CT 10/25/2022-images independently reviewed and interpreted by me in detail summarized above    ASSESSMENT:  This is a 57 y.o. female with:     *Metastatic non-small cell lung cancer  · She was initially diagnosed with stage III (T1N2M0) disease.    · She received initial therapy with cisplatin and Alimta concurrent with radiation therapy beginning 5/25/2021.    · There were indeterminate findings in the lumbar spine at L1 and it was recommended to monitor this over time.    · Patient completed 3 cycles cisplatin and Alimta 7/7/2021 and completed radiation therapy 7/12/2021.    · PET scan 8/6/2021 showed good response to treatment.    · MRI lumbar spine 10/1/2021 with increase in size of the L1 lesion.    · Biopsy of the L1 lesion on 10/14/2022 confirmed metastatic adenocarcinoma of lung origin, PD-L1 TPS 0.    · PET scan 10/26/2021 with involvement of left adrenal and L1/L2 only.    · Patient received radiation therapy to L1/L2 on 11/19/2021.  She also received radiation to the left adrenal gland.    · She initiated further systemic therapy on 11/22/2021 with Keytruda and Alimta.   · Follow-up PET scan 1/18/2022 with decrease in small left upper lobe pulmonary nodule, resolution of activity at L1/L2, no new sites of disease.    · Guardant 360 CT DNA level was monitored and was decreasing.    · PET scan 4/11/2022 with progression in left adrenal and L1.    · Maintained systemic therapy with Keytruda and Alimta and received additional radiation to left adrenal and L1.  Alimta however held since 5/10/2022 due to renal dysfunction.  Radiation completed to lumbar spine 7/14/2022.    · PET scan 7/18/2022 with multiple areas of progression of the spine and right sacrum.    · Guardant 360 analysis  7/20/2022 with no actionable mutations.    · Change in therapy to single agent palliative Taxotere initiated 7/26/2022.    · Altered mental status and febrile neutropenia requiring hospitalization 8/2 - 8/9/2022.  During that admission, MRI brain, cervical, thoracic, lumbar spine performed 8/4/2022 in addition to CT cervical spine 8/5/2022.  There was evidence of C2 metastatic lesion with some dural enhancement, compression deformity at T7 with mild edema suggesting acute to subacute fracture, 5 mm T12 spinous process metastasis, multifocal disease in the lumbar spine and sacrum, largest involving sacral ala to the right 5 cm in transverse dimension.  Loss of height at L2 25%.  · Patient received cycle 3 Taxotere 9/6/2022 with Neulasta support.    · She has been continuing as well on Xgeva every 4 weeks (last received 9/6/2022).  · Patient did undergo MRI lumbar spine and pelvis on 9/24/2022.  MRI pelvis showed bilateral sacral fractures with marrow infiltration related to metastasis more prominent on the right.  Lesion on the right 3.5 x 4.1 x 4.3 cm.  Also probable metastasis along posterior acetabulum on the right 1.5 cm.  MRI lumbar spine with stable L1 metastasis, new edema endplate T12 possibly stress reaction versus developing new metastasis, lesion at T12 spinous process unchanged.   · Radiation oncology consulted with plans for palliative treatment of the right sacral metastasis.  Treatment initiated 9/28/2022  · Completed radiation to the right sacral metastasis on 10/11/2022.  · PET/CT 10/25/2022 without any metabolic uptake in the lung or the skeletal metastasis.  This is indicative of a positive response to Taxotere.  · Pain persistent sitting and walking position and not present when she is laying down.  · Patient is still very active and performance status is very poor.  Due to this reason chemotherapy will be continued to be held.  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  · Due to this reason an MRI of the brain was obtained but unfortunately this was performed without contrast.  Reviewed the MRI and no evidence of metastatic disease.  · We will obtain a lumbar puncture to rule out leptomeningeal carcinomatosis.  · She would also be referred to neurology for further evaluation.     *History of bilateral stage I breast cancer  · Patient with bilateral breast cancer, both stage I ([id number]), grade 2, ER/GA positive and HER2/melinda negative with low Ki-67.    · Patient initiated adjuvant letrozole in May 2021.  · Continue letrozole daily     *Cancer related pain  · Patient has been receiving Duragesic patch 50 mcg every 72 hours in addition to oxycodone 15 mg every 4 hours for breakthrough pain.  Duragesic dose had been escalated recently in the outpatient setting due to worsening pain  · On admission 9/23/2022, Duragesic patch increased to 75 mcg daily, added Decadron 4 mg IV every 6 hours with continuation of oxycodone 15 mg every 4 hours as needed for breakthrough pain in addition of Dilaudid 1 mg every 2 hours as needed for breakthrough pain.  · Patient with increasing side effects from narcotics, Duragesic decreased on 9/25/2022 from 75 down to 50 mcg patch every 72 hours.  · Dexamethasone dose decreased to 4 mg p.o. every 12 hours on 9/27/2022  · Initiated palliative radiation to the right sacrum on 9/28/2022  · Patient became confused after receiving Dilaudid.  Patient and family asked to discontinue Dilaudid.  · Duragesic patch dose was increased to 75 mcg/h on 9/30/2022. Subsequent decrease to 50 mcg due to confusion  · Gabapentin was discontinued due to the sedating effect.  · Oxycodone is being used for breakthrough pain.  · 10/3 transition to sustained release oxycodone 40 mg bid and fentanyl patch stopped  · Poorly controlled, pain particularly worse in sitting and standing position.  · Unsure if extends is contributing  to mental status changes as previously patient had been very sensitive to pain meds.  · Due to this reason we will discontinue the Xtampza ER and switch to OxyContin 20 mg twice daily.     *Anxiety/depression  · Patient was previously on Wellbutrin  mg daily, Lexapro 10 mg daily.  · On 9/30/2022, she was switched from Lexapro to Cymbalta 30 mg daily.  · Atarax initiated for restlessness, and helping with symptom  · No changes     *Insomnia  · Patient was previously on Xanax but reported that it was not working.  · Patient previously tried melatonin and it was not effective.  · She was placed on Restoril 15 mg nightly on 10/1/2022.  · The patient had 3 falls since last night.  · Its not clear if this is due to Restoril but since this happened after the change, we switched back to Xanax-Home dose of 0.5 mg as needed for sleep.  · She has been anxious regarding the PET/CT results.  She feels much better after learning about the PET/CT results today.  · Hold Xanax as patient reports excessive sleepiness     *Anemia  · Most recent evaluation 8/3/2022 with iron 15, ferritin 276, iron saturation 5%, TIBC 328, folate greater than 20, B12 392  · Anemia felt to be related to malignancy/chronic disease and chemotherapy  · Hemoglobin normal    *Thrombocytopenia  · New since the past 2 visits   · Platelets 106,000  · Unclear etiology  · Monitor     *Peripheral neuropathy  · Patient was on gabapentin 300 mg twice daily.  · Gabapentin was discontinued on 9/30/2022.  · Continue Cymbalta  · Neuropathy stable     *Narcotic induced constipation  · Patient is on Sybil-Colace 2 tablets twice daily with MiraLAX daily   · Continues to have regular bowel     *GI prophylaxis  · Protonix 40 mg daily        *Elevated liver labs  · Atorvastatin was discontinued  · FTs normal    *BLE calf pain  · BLE venous duplex negative. Exam reassurring    *Fall with right scalp hematoma  · Improving    *Deconditioning  · Patient continues to be very  sedentary and in bed most of the time.  · She is progressively getting weaker and I do not believe that she will be able to tolerate chemotherapy with her current functional status.  · Recommend physical therapy and Occupational Therapy..    RECOMMENDATIONS:  1. Completed radiation 10/11/2022  2. Decrease sustained-release oxycodone to 20 mg twice daily  3. Cymbalta at 30 mg daily for neuropathy, continue  4. Discontinue dexamethasone  5. Hold saline  6. Referred to pain management  7. Lumbar puncture  8. UA obtained today and reviewed and not suggestive of a UTI  9. 1 L normal saline today    40 minutes spent in face-to-face time with the patient.         Salma Snell MD

## 2022-11-09 NOTE — PROGRESS NOTES
[facility] GROUP INPATIENT PROGRESS NOTE    Length of Stay:  0 days    CHIEF COMPLAINT/REASON FOR VISIT:  Metastatic non-small cell lung cancer  Bilateral breast cancer    Interval history  Marcelle presents to the clinic today for follow-up.  We have had multiple phone calls from her  regarding patient being confused and mental status deteriorating and getting progressively weak.  She is evaluated in the clinic today for the same.  She reports significant pain when she is in the sitting position.  Therefore she has to stay laying in the bed.  Her  Mr. Bettencourt reports that she has been sleeping several hours in the day and not very active at all.  She also seems to be forgetful at times and confused intermittently.  No nausea or vomiting.  She has had very little p.o. intake for the past 2 days due to excessive sleeping.      Oncologic history  Ms. 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     3/1/2021-bilateral screening mammogram-microcalcifications seen in the posterior one third of the lateral aspect of the right breast.  Area of focal asymmetry seen in the middle one third of the upper inner quadrant of the left breast.     3/1/2021-DEXA scan-T score of -2.2 on the lumbar spine and T score of -2.3 in the left hip and T score of -1.9 in the right hip.  Findings consistent with osteopenia     3/23/2021-screening lung CT-there is a 10 x 11 mm solid nodule in the left upper lobe.  Enlarged AP window lymph node measuring 14 x 10 mm.  Suggestion of a possible 9 mm left hilar lymph node.  Heavy coronary artery calcification.     3/23/2021-diagnostic mammogram bilateral-cluster of microcalcifications in the middle third lateral aspect of the right breast.  Left breast demonstrates persistence of the area of focal asymmetry in the region.     Ultrasound-left breast ultrasound at 10 o'clock position, 6 cm from  the nipple there is a 0.4 cm irregular hypoechoic lesion.  Stereotactic biopsy of the right breast calcifications recommended.  Ultrasound-guided biopsy of the left breast lesion recommended     4/7/2021-right breast stereotactic biopsy and left breast ultrasound-guided biopsy  Pathology  Right breast-invasive ductal carcinoma, grade 2, lymphovascular invasion present, ER +99% strong, VT +80% moderate, HER-2 negative, Ki-67 12%  Left breast upper inner quadrant 10 o'clock position biopsy, invasive ductal carcinoma, grade   2, ER +99% strong, VT +40% strong, HER-2 2+ on immunohistochemistry, nonamplified on FISH Ki-67 10%     4/14/2021-PET/CT-FDG avid aortopulmonary window lymph node measures 0.9 cm with an SUV of 7.5.  FDG avid left hilar lymph node measures 1 cm with an SUV of 17.2.  Irregular soft tissue density in the right breast measuring 3.7 x 3.8 cm is favored to be secondary to the recent breast biopsy.  1.1 cm pulmonary nodule within the left upper lobe with SUV 9.7 .  Sub-6 mm pulmonary nodules are present in the right lower lobe.  No evidence of lymphadenopathy or metastatic disease in the abdomen.  Focal area of FDG uptake within the central canal posterior to T11-T12 displaced demonstrating an SUV of 5.5 thought to be reactive however MRI is recommended for further evaluation.     She was seen by Dr. Molina who initially planned for breast surgery however  due to the PET abnormalities she has been referred to Dr. Kerr who plans to do a wound on 4/30/2021.  Dr. Molina's and Dr. Kerr's notes reviewed.     Patient denies any family history of breast cancer.  Her mother had lymphoma.  Maternal grandmother had colon cancer.  Prior to that screening mammogram she did not have any palpable abnormalities of either breast.     She has been a heavy smoker for about 40 years and smoked 2 packs/day.  Denies any recent weight changes, new bone pains, cough, abdominal pain nausea vomiting constipation or  diarrhea.  She does have anxiety and has been on several medications.  She has recently been started on Xanax to help with the mood and also with insomnia.     Patient had a video-assisted thoracoscopy and mediastinal lymphadenectomy on 4/30/2021.  L5-L6 lymph nodes were biopsied however the small pulmonary nodule in the left upper lobe was not difficult to find.  Pathology showed moderately differentiated adenocarcinoma which is CK7 and TTF-1 positive.  Consistent with lung primary.  Ki-67 85%.     5/14/2021-MRI of the brain-minimal chronic small vessel ischemic change in the white matter.  Otherwise negative MRI.     5/20/2021-bilateral axillary ultrasound-no evidence of axillary lymphadenopathy in either axilla.  Normal-appearing bilateral axillary lymph nodes visualized.     Cycle 1 cisplatin and Alimta on 5/25/2021.     Cycle 2 cisplatin Alimta 6/15/2021     Cycle 3 cisplatin Alimta 7/7/2021     Completed radiation on 7/12/2021     Port was nonfunctional hence a port study was performed which showed that the port was backed up into the innominate vein and also there was a nonocclusive thrombus at the end of the catheter extending into the superior vena cava.  She was started on anticoagulation with Eliquis.  It was recommended for port revision of the intention to keep the port.     PET/CT 8/5/2021-images independently reviewed and interpreted by me-decrease in size and FDG uptake of the left upper lobe pulmonary nodule as well as mediastinal and left hilar lymphadenopathy representing response to treatment.  Sub-6 mm pulmonary nodule in the left upper lobe new since 4/14/2021.  This could be related to radiation however follow-up CT in 3 months recommended.  Decrease in size of the irregular masslike tissue in the right breast which is postbiopsy hematoma.  This is not PET avid.  New segmental left eighth rib fractures healing.  A new band of sclerosis of the left seventh rib which favored to represent healing  nondisplaced fracture.  Follow-up CT recommended.  focal uptake in the duodenum first and second portions.  Could be related to duodenitis.  Indeterminate lesion in the L1 vertebral body not well evaluated on PET/CT.     10/1/2021-MRI of the lumbar spine.  Lesion in the left posterior body of L1 measures 14 x 14 x 12 mm and previously 5 x 5 x 6 mm.  The interval enlargement strongly suggest that it is metastatic lesion.  No additional osseous metastasis noted.   Other chronic changes noted.     10/14/2021-biopsy of the lumbar spine lesion-pathology consistent with poorly differentiated carcinoma.  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     10/26/2021-brain MRI-no evidence of metastatic disease.     10/26/2021-bilateral diagnostic mammogram and ultrasound  Scattered fibroglandular density in both breast.  Postbiopsy hematoma with internal biopsy clip in the upper outer quadrant of the right breast, 8 cm from the nipple.  The hematoma size is decreased to 2.9 cm from 3.6 cm earlier.     Left breast-parenchymal density measuring 9 x 10 mm has markedly decreased.  Few adjacent calcifications which are unchanged.  No new abnormality in the left breast.  At 10:00 region there is some minimal adjacent hypoechoic texture which is difficult to measure but appears smaller since the previous exam.     10/28/2021-PET/CT  New L1 and L2 lytic bone metastasis annually intensely hypermetabolic focus at the left adrenal gland likely represents metastatic disease as well.  Slight decrease in size of the 0.9 x 0.7 cm left upper lobe pulmonary nodule.  There is hypermetabolic activity related to radiation pneumonitis.  The remainder of the nodule is photopenic.  Uncertain etiology of the more intense activity along the right lateral peripheral margin of the 2.6 cm postbiopsy density of the right breast.     She completed radiation to to the lumbar  spine on 11/19/2021 11/22/2021-cycle 1 Alimta and Keytruda     3/23/2022-bilateral diagnostic mammogram and ultrasound  Complete resolution of the microcalcifications in the upper outer quadrant of the right breast and decrease in size of the postbiopsy hematoma.  No suspicious abnormalities in the right breast.  Benign-appearing calcifications at the site of malignancy in the left breast upper inner quadrant.  No other suspicious findings.     4/11/2022 PET/CT-there is new groundglass opacities in the left upper lobe which are most likely postradiation changes.  Other groundglass opacities in the left lung as well as the right lung remained stable.  Increased nodularity of the left adrenal gland with an SUV of 5.6, previously 3.5.    Increased uptake in the L1 vertebra in the right posterior aspect with an SUV of 3.6.  Left L1 sclerosis increased     Due to this the case was discussed in multidisciplinary conference.  The disease progression was significant in the left adrenal gland as well as the L1 vertebra.  Since there were only 2 areas of disease progression it has been decided to proceed with radiation to these 2 spots and continue on Keytruda and Alimta.     Patient also has had worsening of her kidney function.  We initially held treatment as of 5/31/2022 and creatinine bumped up to 2.0, BUN 23.  We then resumed therapy 6/11/2022 with Keytruda alone.  Patient was referred to nephrology.     6/6/2022-MRI of the spine-diffuse marrow replacement in the L1 vertebral body and inferior endplate concavity.  Suspicious for metastatic disease with pathological fracture in the inferior endplate.  Also diffuse signal abnormality in the L2 lamina on the left suspicious for additional metastatic disease.  Abnormality of the first sacral segment suspicious for metastatic disease.  Left adrenal gland appears enlarged.  30 to 40% height loss and L2 vertebral body suggestive of a subacute compression  fracture.  Degenerative changes.     7/18/2022-PET/CT  Multiple hypermetabolic osseous lesions with index lesions which have either increased in degree of FDG uptake or newly hypermetabolic lesions representative of metastatic disease and progression of disease.  Possible FDG avid lesion in the left femoral diaphysis however these are incompletely visualized and in the area of artifactual FDG uptake.  MRI of the left femur recommended for further evaluation.  Increasing FDG uptake of the left adrenal gland.  Focal left hilar hypermetabolic him corresponds to the lymph node which has minimally increased in size compared to April 2022.  New sub-6 mm pulmonary nodules.     7/26/2022-cycle 1 of Taxotere     Patient was admitted to the hospital 8/3/2021 discharged on 8/9/2022.  She was admitted for strokelike symptoms and confusion.  The confusion was thought to be secondary to polypharmacy and probably febrile neutropenia.  She was treated with antibiotics.  Mental status improved subsequently.  MRI of the cervical thoracic and lumbar spine was performed on 8/4/2022.  Images independently reviewed by me.  Multiple metastatic lesions in the spine noted.  There was a lesion on the CT which was concerning.  There is also a sacral Lesion measuring up to 5 cm in transverse dimension on the right.  Patient was symptomatic with pain on the right side of the sacrum.  Radiation oncology recommended radiation to the cervical spine as well as sacrum.  CT head without any obvious abnormalities.     Completed radiation to the cervical spine and sacrum on 8/12/2022    She was admitted to the hospital between 9/23/2022 to 10/12/2022.  She was admitted for poorly controlled pain as well as encephalopathy and multiple falls.  She was diagnosed with a UTI and treated with IV Rocephin.  Pain medications changed and also additional radiation to the right sacrum was performed.  3000 cGy in 10 fractions was administered.  She was discharged  on oxycodone 15 mg every 4 hours as needed for pain and OxyContin twice daily.  She was recommended to go to a subacute rehab however she preferred to be discharged home with home physical therapy and Occupational Therapy    9/24/2022-MRI of the pelvis showed osseous metastatic disease in the sacrum worst within the right and probable pathological fractures bilaterally.    10/4/2022 CT head without any evidence of metastatic disease or acute abnormalities.    10/25/2022-PET/CT-resolution of hypermetabolic activity in the upper lobe and left hilum.  Previously noted new tiny pulmonary nodules have resolved.  Resolution of hypermetabolic activity in the left adrenal gland.  Near complete resolution of hypermetabolic activity in the skeletal metastasis particularly the cervical spine, pelvic bones and proximal left femur.  Maximal SUV at the right sacral metastasis is 2.7 and previously it was 8.9.  Tiny focus of hypermetabolic activity at the GE junction with a maximal SUV of 4.7.  No definite thickening or abnormality noted on the CT.    PHYSICAL EXAMINATION:    General: NAD, alert, answers questions appropriately  HEENT: Right periorbital hematoma still present.  Chest/Lungs: Clear to auscultation bilaterally. Right chest mediport in place  Heart: RRR  Abdomen/GI: soft, NT, ND, NABS  Extremities: Bilateral lower extremity without any swelling.  However decree strength in both lower extremities.    I have reexamined the patient and the results are consistent with the previously documented exam. Salma Snell MD       DIAGNOSTIC DATA:  Results Review:     I reviewed the patient's new clinical results.    Results from last 7 days   Lab Units 11/09/22  1202   WBC 10*3/mm3 6.20   HEMOGLOBIN g/dL 10.5*   HEMATOCRIT % 34.3   PLATELETS 10*3/mm3 106*     Lab Results   Component Value Date    NEUTROABS 5.06 11/09/2022     Results from last 7 days   Lab Units 11/09/22  1202   SODIUM mmol/L 139   POTASSIUM mmol/L 3.4*    CHLORIDE mmol/L 103   CO2 mmol/L 22.2   BUN mg/dL 12   CREATININE mg/dL 1.04   GLUCOSE mg/dL 125*   CALCIUM mg/dL 8.6         Results from last 7 days   Lab Units 11/09/22  1202   MAGNESIUM mg/dL 2.0         IMAGING:      PET/CT 10/25/2022-images independently reviewed and interpreted by me in detail summarized above    ASSESSMENT:  This is a 57 y.o. female with:     *Metastatic non-small cell lung cancer  · She was initially diagnosed with stage III (T1N2M0) disease.    · She received initial therapy with cisplatin and Alimta concurrent with radiation therapy beginning 5/25/2021.    · There were indeterminate findings in the lumbar spine at L1 and it was recommended to monitor this over time.    · Patient completed 3 cycles cisplatin and Alimta 7/7/2021 and completed radiation therapy 7/12/2021.    · PET scan 8/6/2021 showed good response to treatment.    · MRI lumbar spine 10/1/2021 with increase in size of the L1 lesion.    · Biopsy of the L1 lesion on 10/14/2022 confirmed metastatic adenocarcinoma of lung origin, PD-L1 TPS 0.    · PET scan 10/26/2021 with involvement of left adrenal and L1/L2 only.    · Patient received radiation therapy to L1/L2 on 11/19/2021.  She also received radiation to the left adrenal gland.    · She initiated further systemic therapy on 11/22/2021 with Keytruda and Alimta.   · Follow-up PET scan 1/18/2022 with decrease in small left upper lobe pulmonary nodule, resolution of activity at L1/L2, no new sites of disease.    · Guardant 360 CT DNA level was monitored and was decreasing.    · PET scan 4/11/2022 with progression in left adrenal and L1.    · Maintained systemic therapy with Keytruda and Alimta and received additional radiation to left adrenal and L1.  Alimta however held since 5/10/2022 due to renal dysfunction.  Radiation completed to lumbar spine 7/14/2022.    · PET scan 7/18/2022 with multiple areas of progression of the spine and right sacrum.    · Guardant 360 analysis  7/20/2022 with no actionable mutations.    · Change in therapy to single agent palliative Taxotere initiated 7/26/2022.    · Altered mental status and febrile neutropenia requiring hospitalization 8/2 - 8/9/2022.  During that admission, MRI brain, cervical, thoracic, lumbar spine performed 8/4/2022 in addition to CT cervical spine 8/5/2022.  There was evidence of C2 metastatic lesion with some dural enhancement, compression deformity at T7 with mild edema suggesting acute to subacute fracture, 5 mm T12 spinous process metastasis, multifocal disease in the lumbar spine and sacrum, largest involving sacral ala to the right 5 cm in transverse dimension.  Loss of height at L2 25%.  · Patient received cycle 3 Taxotere 9/6/2022 with Neulasta support.    · She has been continuing as well on Xgeva every 4 weeks (last received 9/6/2022).  · Patient did undergo MRI lumbar spine and pelvis on 9/24/2022.  MRI pelvis showed bilateral sacral fractures with marrow infiltration related to metastasis more prominent on the right.  Lesion on the right 3.5 x 4.1 x 4.3 cm.  Also probable metastasis along posterior acetabulum on the right 1.5 cm.  MRI lumbar spine with stable L1 metastasis, new edema endplate T12 possibly stress reaction versus developing new metastasis, lesion at T12 spinous process unchanged.   · Radiation oncology consulted with plans for palliative treatment of the right sacral metastasis.  Treatment initiated 9/28/2022  · Completed radiation to the right sacral metastasis on 10/11/2022.  · PET/CT 10/25/2022 without any metabolic uptake in the lung or the skeletal metastasis.  This is indicative of a positive response to Taxotere.  · Pain persistent sitting and walking position and not present when she is laying down.  · Patient is still very active and performance status is very poor.  Due to this reason chemotherapy will be continued to be held.  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  · Due to this reason an MRI of the brain was obtained but unfortunately this was performed without contrast.  Reviewed the MRI and no evidence of metastatic disease.  · We will obtain a lumbar puncture to rule out leptomeningeal carcinomatosis.  · She would also be referred to neurology for further evaluation.     *History of bilateral stage I breast cancer  · Patient with bilateral breast cancer, both stage I ([id number]), grade 2, ER/WV positive and HER2/melinda negative with low Ki-67.    · Patient initiated adjuvant letrozole in May 2021.  · Continue letrozole daily     *Cancer related pain  · Patient has been receiving Duragesic patch 50 mcg every 72 hours in addition to oxycodone 15 mg every 4 hours for breakthrough pain.  Duragesic dose had been escalated recently in the outpatient setting due to worsening pain  · On admission 9/23/2022, Duragesic patch increased to 75 mcg daily, added Decadron 4 mg IV every 6 hours with continuation of oxycodone 15 mg every 4 hours as needed for breakthrough pain in addition of Dilaudid 1 mg every 2 hours as needed for breakthrough pain.  · Patient with increasing side effects from narcotics, Duragesic decreased on 9/25/2022 from 75 down to 50 mcg patch every 72 hours.  · Dexamethasone dose decreased to 4 mg p.o. every 12 hours on 9/27/2022  · Initiated palliative radiation to the right sacrum on 9/28/2022  · Patient became confused after receiving Dilaudid.  Patient and family asked to discontinue Dilaudid.  · Duragesic patch dose was increased to 75 mcg/h on 9/30/2022. Subsequent decrease to 50 mcg due to confusion  · Gabapentin was discontinued due to the sedating effect.  · Oxycodone is being used for breakthrough pain.  · 10/3 transition to sustained release oxycodone 40 mg bid and fentanyl patch stopped  · Poorly controlled, pain particularly worse in sitting and standing position.  · Unsure if extends is contributing  to mental status changes as previously patient had been very sensitive to pain meds.  · Due to this reason we will discontinue the Xtampza ER and switch to OxyContin 20 mg twice daily.     *Anxiety/depression  · Patient was previously on Wellbutrin  mg daily, Lexapro 10 mg daily.  · On 9/30/2022, she was switched from Lexapro to Cymbalta 30 mg daily.  · Atarax initiated for restlessness, and helping with symptom  · No changes     *Insomnia  · Patient was previously on Xanax but reported that it was not working.  · Patient previously tried melatonin and it was not effective.  · She was placed on Restoril 15 mg nightly on 10/1/2022.  · The patient had 3 falls since last night.  · Its not clear if this is due to Restoril but since this happened after the change, we switched back to Xanax-Home dose of 0.5 mg as needed for sleep.  · She has been anxious regarding the PET/CT results.  She feels much better after learning about the PET/CT results today.  · Hold Xanax as patient reports excessive sleepiness     *Anemia  · Most recent evaluation 8/3/2022 with iron 15, ferritin 276, iron saturation 5%, TIBC 328, folate greater than 20, B12 392  · Anemia felt to be related to malignancy/chronic disease and chemotherapy  · Hemoglobin normal    *Thrombocytopenia  · New since the past 2 visits   · Platelets 106,000  · Unclear etiology  · Monitor     *Peripheral neuropathy  · Patient was on gabapentin 300 mg twice daily.  · Gabapentin was discontinued on 9/30/2022.  · Continue Cymbalta  · Neuropathy stable     *Narcotic induced constipation  · Patient is on Sybil-Colace 2 tablets twice daily with MiraLAX daily   · Continues to have regular bowel     *GI prophylaxis  · Protonix 40 mg daily        *Elevated liver labs  · Atorvastatin was discontinued  · FTs normal    *BLE calf pain  · BLE venous duplex negative. Exam reassurring    *Fall with right scalp hematoma  · Improving    *Deconditioning  · Patient continues to be very  sedentary and in bed most of the time.  · She is progressively getting weaker and I do not believe that she will be able to tolerate chemotherapy with her current functional status.  · Recommend physical therapy and Occupational Therapy..    RECOMMENDATIONS:  1. Completed radiation 10/11/2022  2. Decrease sustained-release oxycodone to 20 mg twice daily  3. Cymbalta at 30 mg daily for neuropathy, continue  4. Discontinue dexamethasone  5. Hold saline  6. Referred to pain management  7. Lumbar puncture  8. UA obtained today and reviewed and not suggestive of a UTI  9. 1 L normal saline today    40 minutes spent in face-to-face time with the patient.         Salma Snell MD

## 2022-11-10 RX ORDER — OXYCODONE HCL 10 MG/1
10 TABLET, FILM COATED, EXTENDED RELEASE ORAL EVERY 12 HOURS
Qty: 60 TABLET | Refills: 0 | Status: SHIPPED | OUTPATIENT
Start: 2022-11-10 | End: 2022-11-11 | Stop reason: SDUPTHER

## 2022-11-10 NOTE — PROGRESS NOTES
11/15/22 0001   Pre-Procedure Phone Call   Procedure Time Verified Yes   Arrival Time 1200   Procedure Location Verified Yes   Medical History Reviewed No   NPO Status Reinforced Yes   Ride and Caregiver Arranged Yes   Patient Knows to Bring Current Medications No   Bring Outside Films Requested No

## 2022-11-11 ENCOUNTER — TELEPHONE (OUTPATIENT)
Dept: ONCOLOGY | Facility: CLINIC | Age: 57
End: 2022-11-11

## 2022-11-11 ENCOUNTER — DOCUMENTATION (OUTPATIENT)
Dept: ONCOLOGY | Facility: CLINIC | Age: 57
End: 2022-11-11

## 2022-11-11 DIAGNOSIS — C34.90 NON-SMALL CELL LUNG CANCER METASTATIC TO BONE: ICD-10-CM

## 2022-11-11 DIAGNOSIS — C79.51 NON-SMALL CELL LUNG CANCER METASTATIC TO BONE: ICD-10-CM

## 2022-11-11 RX ORDER — OXYCODONE HCL 20 MG/1
20 TABLET, FILM COATED, EXTENDED RELEASE ORAL EVERY 12 HOURS
Qty: 60 TABLET | Refills: 0 | Status: SHIPPED | OUTPATIENT
Start: 2022-11-11 | End: 2023-02-06

## 2022-11-11 RX ORDER — OXYCODONE HCL 10 MG/1
10 TABLET, FILM COATED, EXTENDED RELEASE ORAL EVERY 12 HOURS
Qty: 40 TABLET | Refills: 0 | Status: SHIPPED | OUTPATIENT
Start: 2022-11-11 | End: 2022-11-11

## 2022-11-11 NOTE — PROGRESS NOTES
Staff message rec from Brooklynn DE SANTIAGO Clinical RN-Rx for Oxycontin sent yesterday and pts  has reported Pharmacy needs a PA. Dr Snell wants to change Xtampza to Oxycodone due to a lot of confusion.    I submitted the PA to [employer] through [employer]. The request has been approved from 11/11/22-11/10/24. I ran a test claim through our pharmacy system and it paid with a $0 copay for pt. I attempted to contact [facility] and they are not open yet. I left a detailed VM asking for them to reprocess the rx as it is now approved through [employer]. My direct number listed should there be issues getting a paid claim.    Brooklynn Powell, RN sent to [facility].  We sent oxycontin yesterday, she's having a lot of confusion so she wanted to change her from the xtampza to oxycontin.  Her  called and said the pharmacy said it needed insurance auth.  Can you check on that?  She's almost out of the xtampza, I think she has one left for lara Tim  Specialty Pharmacy Technician

## 2022-11-11 NOTE — TELEPHONE ENCOUNTER
Caller: [facility] [phone] - [facility] [address] [address] - [phone]  - [phone]    Relationship: Pharmacy    Best call back number: [phone]      Who are you requesting to speak with (clinical staff, provider,  specific staff member): CLINICAL      What was the call regarding: PHARMACY CALLED STATED THAT THE OXYCODONE ER 10 MG, THEY DONT MAKE ANYMORE BUT THEY MAKE THE BRAND NAME FOR IT, PHARMACY GAVE THEM 20 TABLETS OF WHAT THEY HAVE LEFT OF THE GENERIC, BUT WOULD NEED ANOTHER SCRIPT FOR THE BRAND NAME     Do you require a callback: YES

## 2022-11-11 NOTE — TELEPHONE ENCOUNTER
----- Message from Brooklynn Powell RN sent at 11/10/2022 12:32 PM EST -----  She needs a lumbar puncture ASAP, I see it's not scheduled until 11/30.  We need it sooner that that.  Then follow up after LP done.    Thank you!    ----- Message -----  From: Emma Galvan  Sent: 11/10/2022  11:57 AM EST  To: Brooklynn Powell RN    Does she  need any f/u apts? Just let me know. Thanks, Emma

## 2022-11-14 DIAGNOSIS — C34.90 NON-SMALL CELL LUNG CANCER METASTATIC TO BONE: ICD-10-CM

## 2022-11-14 DIAGNOSIS — C79.51 NON-SMALL CELL LUNG CANCER METASTATIC TO BONE: ICD-10-CM

## 2022-11-14 RX ORDER — OXYCODONE HYDROCHLORIDE 15 MG/1
15 TABLET ORAL EVERY 4 HOURS PRN
Qty: 120 TABLET | Refills: 0 | Status: SHIPPED | OUTPATIENT
Start: 2022-11-14 | End: 2022-12-12 | Stop reason: SDUPTHER

## 2022-11-15 ENCOUNTER — HOSPITAL ENCOUNTER (OUTPATIENT)
Dept: GENERAL RADIOLOGY | Facility: HOSPITAL | Age: 57
Discharge: HOME | End: 2022-11-15
Admitting: INTERNAL MEDICINE

## 2022-11-15 VITALS
HEART RATE: 102 BPM | SYSTOLIC BLOOD PRESSURE: 131 MMHG | DIASTOLIC BLOOD PRESSURE: 95 MMHG | TEMPERATURE: 98 F | RESPIRATION RATE: 18 BRPM | OXYGEN SATURATION: 96 % | BODY MASS INDEX: 24.98 KG/M2 | WEIGHT: 141 LBS | HEIGHT: 63 IN

## 2022-11-15 DIAGNOSIS — C79.51 NON-SMALL CELL LUNG CANCER METASTATIC TO BONE: ICD-10-CM

## 2022-11-15 DIAGNOSIS — C34.90 NON-SMALL CELL LUNG CANCER METASTATIC TO BONE: ICD-10-CM

## 2022-11-15 LAB
APPEARANCE CSF: CLEAR
APPEARANCE CSF: CLEAR
COLOR CSF: COLORLESS
COLOR CSF: COLORLESS
GLUCOSE CSF-MCNC: 58 MG/DL (ref 40–70)
METHOD: ABNORMAL
METHOD: NORMAL
NUC CELL # CSF MANUAL: 1 /MM3 (ref 0–5)
NUC CELL # CSF MANUAL: 1 /MM3 (ref 0–5)
PROT CSF-MCNC: 65.2 MG/DL (ref 15–45)
RBC # CSF MANUAL: 0 /MM3 (ref 0–0)
RBC # CSF MANUAL: 8 /MM3 (ref 0–0)
TUBE # CSF: 1
TUBE # CSF: 4

## 2022-11-15 PROCEDURE — 84157 ASSAY OF PROTEIN OTHER: CPT | Performed by: INTERNAL MEDICINE

## 2022-11-15 PROCEDURE — 89050 BODY FLUID CELL COUNT: CPT | Performed by: INTERNAL MEDICINE

## 2022-11-15 PROCEDURE — 88108 CYTOPATH CONCENTRATE TECH: CPT | Performed by: INTERNAL MEDICINE

## 2022-11-15 PROCEDURE — 0 LIDOCAINE 1 % SOLUTION: Performed by: INTERNAL MEDICINE

## 2022-11-15 PROCEDURE — 82945 GLUCOSE OTHER FLUID: CPT | Performed by: INTERNAL MEDICINE

## 2022-11-15 PROCEDURE — 87205 SMEAR GRAM STAIN: CPT | Performed by: INTERNAL MEDICINE

## 2022-11-15 PROCEDURE — 87015 SPECIMEN INFECT AGNT CONCNTJ: CPT | Performed by: INTERNAL MEDICINE

## 2022-11-15 PROCEDURE — 87070 CULTURE OTHR SPECIMN AEROBIC: CPT | Performed by: INTERNAL MEDICINE

## 2022-11-15 RX ORDER — LIDOCAINE HYDROCHLORIDE 10 MG/ML
10 INJECTION, SOLUTION INFILTRATION; PERINEURAL ONCE
Status: COMPLETED | OUTPATIENT
Start: 2022-11-15 | End: 2022-11-15

## 2022-11-15 RX ADMIN — LIDOCAINE HYDROCHLORIDE 3 ML: 10 INJECTION, SOLUTION INFILTRATION; PERINEURAL at 14:15

## 2022-11-15 NOTE — NURSING NOTE
Patient arrived in Radiology Triage [facility] for Lumbar Puncture.    Protective goggles and mask in place with all patient interactions today.

## 2022-11-15 NOTE — NURSING NOTE
Patient wheeled to Patient Discharge to meet  with car.    Protective goggles and mask in place with all patient interactions today.

## 2022-11-15 NOTE — DISCHARGE INSTRUCTIONS
EDUCATION /DISCHARGE INSTRUCTION   A lumbar puncture involves insertion of a sterile needle into the spinal canal by the physician   This procedure is performed for several reasons: to detect increased intracranial pressure, the presence of blood in cerebrospinal fluid (CFS), to obtain CSF specimens for laboratory studies, to administer drugs and to relieve pressure by removing CSF.    You will lie on your stomach with a pillow under your stomach.  This opens the vertebral space to allow access to the spinal needle.  The physician will sterilize the area using antiseptic solution and numb the area where the spinal needle will be placed.  If CSF is being removed for specimen, you may be asked to push or beardown to assist with the flow of the CSF. When the procedure is complete, a band aid will be placed over the injection site and you will be taken to the recovery area.    POTENTIAL RISKS OF A LUMBAR PUNCTURE INCLUDE BUT ARE NOT LIMITED TO:  *  Headache    *  Swelling at puncture site  *  Bleeding into the spinal canal *  Temporary difficulty urinating  *  Leakage of CSF   *  Fever  *  Pain caused by nerve irritation    BENEFIT OF PROCEDURE:  This is the only way to obtain spinal fluid or relieve pressure due to increased CSF.  There is no alternative.  THIS EDUCATION INFORMATION WAS REVIEWED PRIOR TO PROCEDURE AND CONSENT. Patient initials__________________Time_________________    FOLLOWING A LUMBAR PUNCTURE:  *  Drink plenty of liquids -  Caffeine is recommended   *  Lie down flat for the next 8 hours.  If you get a headache, lie down flat for 24 hours, continue to force fluids and call your doctor if  the headache persists.  *  Go straight home.  DO NOT DRIVE    CALL YOUR DOCTOR IF YOU HAVE:  *  A severe headache that will not go away  *  Redness, swelling or drainage from the puncture site  *  Neck stiffness, numbness or weakness  *  Any new or severe symptoms    Following the procedure, you should continue to  take all of your medications as directed by your primary physician unless otherwise instructed.  There have been no changes to the medications you provided us (with the following exceptions if applicable):    YOU ARE THE MOST IMPORTANT FACTOR IN YOUR RECOVERY.  IF YOU HAVE QUESTIONS OR CONCERNS PLEASE CALL THE RADIOLOGY NURSES AT [phone]

## 2022-11-17 LAB
CYTO UR: NORMAL
LAB AP CASE REPORT: NORMAL
PATH REPORT.FINAL DX SPEC: NORMAL
PATH REPORT.GROSS SPEC: NORMAL

## 2022-11-18 ENCOUNTER — INFUSION (OUTPATIENT)
Dept: ONCOLOGY | Facility: HOSPITAL | Age: 57
End: 2022-11-18

## 2022-11-18 ENCOUNTER — TELEPHONE (OUTPATIENT)
Dept: NEUROLOGY | Facility: CLINIC | Age: 57
End: 2022-11-18

## 2022-11-18 ENCOUNTER — OFFICE VISIT (OUTPATIENT)
Dept: ONCOLOGY | Facility: CLINIC | Age: 57
End: 2022-11-18

## 2022-11-18 VITALS
DIASTOLIC BLOOD PRESSURE: 93 MMHG | HEART RATE: 113 BPM | HEIGHT: 63 IN | BODY MASS INDEX: 24.98 KG/M2 | RESPIRATION RATE: 18 BRPM | OXYGEN SATURATION: 99 % | SYSTOLIC BLOOD PRESSURE: 132 MMHG | TEMPERATURE: 97.2 F

## 2022-11-18 DIAGNOSIS — C79.51 NON-SMALL CELL LUNG CANCER METASTATIC TO BONE: Primary | ICD-10-CM

## 2022-11-18 DIAGNOSIS — R41.0 CONFUSION: ICD-10-CM

## 2022-11-18 DIAGNOSIS — C34.12 PRIMARY ADENOCARCINOMA OF UPPER LOBE OF LEFT LUNG: ICD-10-CM

## 2022-11-18 DIAGNOSIS — C34.90 NON-SMALL CELL LUNG CANCER METASTATIC TO BONE: Primary | ICD-10-CM

## 2022-11-18 DIAGNOSIS — C34.90 NON-SMALL CELL LUNG CANCER METASTATIC TO BONE: ICD-10-CM

## 2022-11-18 DIAGNOSIS — C50.411 MALIGNANT NEOPLASM OF UPPER-OUTER QUADRANT OF RIGHT BREAST IN FEMALE, ESTROGEN RECEPTOR POSITIVE: ICD-10-CM

## 2022-11-18 DIAGNOSIS — C79.51 NON-SMALL CELL LUNG CANCER METASTATIC TO BONE: ICD-10-CM

## 2022-11-18 DIAGNOSIS — R29.818 NEUROLOGICAL DEFICIT PRESENT: ICD-10-CM

## 2022-11-18 DIAGNOSIS — Z17.0 MALIGNANT NEOPLASM OF UPPER-OUTER QUADRANT OF RIGHT BREAST IN FEMALE, ESTROGEN RECEPTOR POSITIVE: ICD-10-CM

## 2022-11-18 DIAGNOSIS — Z45.2 ENCOUNTER FOR FITTING AND ADJUSTMENT OF VASCULAR CATHETER: ICD-10-CM

## 2022-11-18 LAB
ALBUMIN SERPL-MCNC: 3.1 G/DL (ref 3.5–5.2)
ALBUMIN/GLOB SERPL: 1 G/DL (ref 1.1–2.4)
ALP SERPL-CCNC: 151 U/L (ref 38–116)
ALT SERPL W P-5'-P-CCNC: 8 U/L (ref 0–33)
ANION GAP SERPL CALCULATED.3IONS-SCNC: 13.5 MMOL/L (ref 5–15)
AST SERPL-CCNC: 13 U/L (ref 0–32)
BACTERIA SPEC AEROBE CULT: NORMAL
BASOPHILS # BLD AUTO: 0.02 10*3/MM3 (ref 0–0.2)
BASOPHILS NFR BLD AUTO: 0.3 % (ref 0–1.5)
BILIRUB SERPL-MCNC: 0.2 MG/DL (ref 0.2–1.2)
BUN SERPL-MCNC: 12 MG/DL (ref 6–20)
BUN/CREAT SERPL: 11 (ref 7.3–30)
CALCIUM SPEC-SCNC: 8.8 MG/DL (ref 8.5–10.2)
CHLORIDE SERPL-SCNC: 103 MMOL/L (ref 98–107)
CO2 SERPL-SCNC: 22.5 MMOL/L (ref 22–29)
CORTIS SERPL-MCNC: 18.7 MCG/DL
CREAT SERPL-MCNC: 1.09 MG/DL (ref 0.6–1.1)
DEPRECATED RDW RBC AUTO: 51.2 FL (ref 37–54)
EGFRCR SERPLBLD CKD-EPI 2021: 58.6 ML/MIN/1.73
EOSINOPHIL # BLD AUTO: 0.04 10*3/MM3 (ref 0–0.4)
EOSINOPHIL NFR BLD AUTO: 0.6 % (ref 0.3–6.2)
ERYTHROCYTE [DISTWIDTH] IN BLOOD BY AUTOMATED COUNT: 14.6 % (ref 12.3–15.4)
GLOBULIN UR ELPH-MCNC: 3 GM/DL (ref 1.8–3.5)
GLUCOSE SERPL-MCNC: 155 MG/DL (ref 74–124)
GRAM STN SPEC: NORMAL
GRAM STN SPEC: NORMAL
HCT VFR BLD AUTO: 33 % (ref 34–46.6)
HGB BLD-MCNC: 10.2 G/DL (ref 12–15.9)
IMM GRANULOCYTES # BLD AUTO: 0.04 10*3/MM3 (ref 0–0.05)
IMM GRANULOCYTES NFR BLD AUTO: 0.6 % (ref 0–0.5)
LYMPHOCYTES # BLD AUTO: 0.46 10*3/MM3 (ref 0.7–3.1)
LYMPHOCYTES NFR BLD AUTO: 6.8 % (ref 19.6–45.3)
MCH RBC QN AUTO: 29.9 PG (ref 26.6–33)
MCHC RBC AUTO-ENTMCNC: 30.9 G/DL (ref 31.5–35.7)
MCV RBC AUTO: 96.8 FL (ref 79–97)
MONOCYTES # BLD AUTO: 0.91 10*3/MM3 (ref 0.1–0.9)
MONOCYTES NFR BLD AUTO: 13.5 % (ref 5–12)
NEUTROPHILS NFR BLD AUTO: 5.26 10*3/MM3 (ref 1.7–7)
NEUTROPHILS NFR BLD AUTO: 78.2 % (ref 42.7–76)
NRBC BLD AUTO-RTO: 0 /100 WBC (ref 0–0.2)
PLATELET # BLD AUTO: 147 10*3/MM3 (ref 140–450)
PMV BLD AUTO: 9.7 FL (ref 6–12)
POTASSIUM SERPL-SCNC: 3.4 MMOL/L (ref 3.5–4.7)
PROT SERPL-MCNC: 6.1 G/DL (ref 6.3–8)
RBC # BLD AUTO: 3.41 10*6/MM3 (ref 3.77–5.28)
SODIUM SERPL-SCNC: 139 MMOL/L (ref 134–145)
T4 FREE SERPL-MCNC: 1.31 NG/DL (ref 0.93–1.7)
TSH SERPL DL<=0.05 MIU/L-ACNC: 1.36 UIU/ML (ref 0.27–4.2)
WBC NRBC COR # BLD: 6.73 10*3/MM3 (ref 3.4–10.8)

## 2022-11-18 PROCEDURE — 80053 COMPREHEN METABOLIC PANEL: CPT

## 2022-11-18 PROCEDURE — 84439 ASSAY OF FREE THYROXINE: CPT | Performed by: INTERNAL MEDICINE

## 2022-11-18 PROCEDURE — 99215 OFFICE O/P EST HI 40 MIN: CPT | Performed by: INTERNAL MEDICINE

## 2022-11-18 PROCEDURE — 82533 TOTAL CORTISOL: CPT | Performed by: INTERNAL MEDICINE

## 2022-11-18 PROCEDURE — 84443 ASSAY THYROID STIM HORMONE: CPT | Performed by: INTERNAL MEDICINE

## 2022-11-18 PROCEDURE — 25010000002 HEPARIN LOCK FLUSH PER 10 UNITS: Performed by: INTERNAL MEDICINE

## 2022-11-18 PROCEDURE — 96360 HYDRATION IV INFUSION INIT: CPT

## 2022-11-18 PROCEDURE — 96361 HYDRATE IV INFUSION ADD-ON: CPT

## 2022-11-18 PROCEDURE — 85025 COMPLETE CBC W/AUTO DIFF WBC: CPT

## 2022-11-18 RX ORDER — SODIUM CHLORIDE 9 MG/ML
1000 INJECTION, SOLUTION INTRAVENOUS ONCE
Status: COMPLETED | OUTPATIENT
Start: 2022-11-18 | End: 2022-11-18

## 2022-11-18 RX ORDER — SODIUM CHLORIDE 0.9 % (FLUSH) 0.9 %
10 SYRINGE (ML) INJECTION AS NEEDED
Status: DISCONTINUED | OUTPATIENT
Start: 2022-11-18 | End: 2022-11-18 | Stop reason: HOSPADM

## 2022-11-18 RX ORDER — HEPARIN SODIUM (PORCINE) LOCK FLUSH IV SOLN 100 UNIT/ML 100 UNIT/ML
500 SOLUTION INTRAVENOUS AS NEEDED
Status: CANCELLED | OUTPATIENT
Start: 2022-11-18

## 2022-11-18 RX ORDER — HEPARIN SODIUM (PORCINE) LOCK FLUSH IV SOLN 100 UNIT/ML 100 UNIT/ML
500 SOLUTION INTRAVENOUS AS NEEDED
Status: DISCONTINUED | OUTPATIENT
Start: 2022-11-18 | End: 2022-11-18 | Stop reason: HOSPADM

## 2022-11-18 RX ORDER — SODIUM CHLORIDE 0.9 % (FLUSH) 0.9 %
10 SYRINGE (ML) INJECTION AS NEEDED
Status: CANCELLED | OUTPATIENT
Start: 2022-11-18

## 2022-11-18 RX ADMIN — Medication 10 ML: at 12:17

## 2022-11-18 RX ADMIN — Medication 500 UNITS: at 12:16

## 2022-11-18 RX ADMIN — SODIUM CHLORIDE 1000 ML: 9 INJECTION, SOLUTION INTRAVENOUS at 10:40

## 2022-11-18 NOTE — PROGRESS NOTES
[facility] GROUP INPATIENT PROGRESS NOTE    Length of Stay:  0 days    CHIEF COMPLAINT/REASON FOR VISIT:  Metastatic non-small cell lung cancer  Bilateral breast cancer    Interval history  Patient presents to the clinic today for follow-up.  She had a lumbar puncture performed and here to review the results of the same.  She is accompanied by her .  She remains confused and unable to answer all the questions appropriately.  She is oriented to place and person.  She continues to experience bilateral lower extremity weakness and significant pain in the lower back and hip area and unable to weight-bear.  According to her  Jelani, she has had very little oral intake and also has been sleeping over 36 hours straight.  Decreased urine output and also constipation.      Oncologic history  Ms. 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     3/1/2021-bilateral screening mammogram-microcalcifications seen in the posterior one third of the lateral aspect of the right breast.  Area of focal asymmetry seen in the middle one third of the upper inner quadrant of the left breast.     3/1/2021-DEXA scan-T score of -2.2 on the lumbar spine and T score of -2.3 in the left hip and T score of -1.9 in the right hip.  Findings consistent with osteopenia     3/23/2021-screening lung CT-there is a 10 x 11 mm solid nodule in the left upper lobe.  Enlarged AP window lymph node measuring 14 x 10 mm.  Suggestion of a possible 9 mm left hilar lymph node.  Heavy coronary artery calcification.     3/23/2021-diagnostic mammogram bilateral-cluster of microcalcifications in the middle third lateral aspect of the right breast.  Left breast demonstrates persistence of the area of focal asymmetry in the region.     Ultrasound-left breast ultrasound at 10 o'clock position, 6 cm from the nipple there is a 0.4 cm irregular hypoechoic  lesion.  Stereotactic biopsy of the right breast calcifications recommended.  Ultrasound-guided biopsy of the left breast lesion recommended     4/7/2021-right breast stereotactic biopsy and left breast ultrasound-guided biopsy  Pathology  Right breast-invasive ductal carcinoma, grade 2, lymphovascular invasion present, ER +99% strong, KY +80% moderate, HER-2 negative, Ki-67 12%  Left breast upper inner quadrant 10 o'clock position biopsy, invasive ductal carcinoma, grade   2, ER +99% strong, KY +40% strong, HER-2 2+ on immunohistochemistry, nonamplified on FISH Ki-67 10%     4/14/2021-PET/CT-FDG avid aortopulmonary window lymph node measures 0.9 cm with an SUV of 7.5.  FDG avid left hilar lymph node measures 1 cm with an SUV of 17.2.  Irregular soft tissue density in the right breast measuring 3.7 x 3.8 cm is favored to be secondary to the recent breast biopsy.  1.1 cm pulmonary nodule within the left upper lobe with SUV 9.7 .  Sub-6 mm pulmonary nodules are present in the right lower lobe.  No evidence of lymphadenopathy or metastatic disease in the abdomen.  Focal area of FDG uptake within the central canal posterior to T11-T12 displaced demonstrating an SUV of 5.5 thought to be reactive however MRI is recommended for further evaluation.     She was seen by Dr. Molina who initially planned for breast surgery however  due to the PET abnormalities she has been referred to Dr. Kerr who plans to do a wound on 4/30/2021.  Dr. Molina's and Dr. Kerr's notes reviewed.     Patient denies any family history of breast cancer.  Her mother had lymphoma.  Maternal grandmother had colon cancer.  Prior to that screening mammogram she did not have any palpable abnormalities of either breast.     She has been a heavy smoker for about 40 years and smoked 2 packs/day.  Denies any recent weight changes, new bone pains, cough, abdominal pain nausea vomiting constipation or diarrhea.  She does have anxiety and has been on several  medications.  She has recently been started on Xanax to help with the mood and also with insomnia.     Patient had a video-assisted thoracoscopy and mediastinal lymphadenectomy on 4/30/2021.  L5-L6 lymph nodes were biopsied however the small pulmonary nodule in the left upper lobe was not difficult to find.  Pathology showed moderately differentiated adenocarcinoma which is CK7 and TTF-1 positive.  Consistent with lung primary.  Ki-67 85%.     5/14/2021-MRI of the brain-minimal chronic small vessel ischemic change in the white matter.  Otherwise negative MRI.     5/20/2021-bilateral axillary ultrasound-no evidence of axillary lymphadenopathy in either axilla.  Normal-appearing bilateral axillary lymph nodes visualized.     Cycle 1 cisplatin and Alimta on 5/25/2021.     Cycle 2 cisplatin Alimta 6/15/2021     Cycle 3 cisplatin Alimta 7/7/2021     Completed radiation on 7/12/2021     Port was nonfunctional hence a port study was performed which showed that the port was backed up into the innominate vein and also there was a nonocclusive thrombus at the end of the catheter extending into the superior vena cava.  She was started on anticoagulation with Eliquis.  It was recommended for port revision of the intention to keep the port.     PET/CT 8/5/2021-images independently reviewed and interpreted by me-decrease in size and FDG uptake of the left upper lobe pulmonary nodule as well as mediastinal and left hilar lymphadenopathy representing response to treatment.  Sub-6 mm pulmonary nodule in the left upper lobe new since 4/14/2021.  This could be related to radiation however follow-up CT in 3 months recommended.  Decrease in size of the irregular masslike tissue in the right breast which is postbiopsy hematoma.  This is not PET avid.  New segmental left eighth rib fractures healing.  A new band of sclerosis of the left seventh rib which favored to represent healing nondisplaced fracture.  Follow-up CT recommended.  focal  uptake in the duodenum first and second portions.  Could be related to duodenitis.  Indeterminate lesion in the L1 vertebral body not well evaluated on PET/CT.     10/1/2021-MRI of the lumbar spine.  Lesion in the left posterior body of L1 measures 14 x 14 x 12 mm and previously 5 x 5 x 6 mm.  The interval enlargement strongly suggest that it is metastatic lesion.  No additional osseous metastasis noted.   Other chronic changes noted.     10/14/2021-biopsy of the lumbar spine lesion-pathology consistent with poorly differentiated carcinoma.  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     10/26/2021-brain MRI-no evidence of metastatic disease.     10/26/2021-bilateral diagnostic mammogram and ultrasound  Scattered fibroglandular density in both breast.  Postbiopsy hematoma with internal biopsy clip in the upper outer quadrant of the right breast, 8 cm from the nipple.  The hematoma size is decreased to 2.9 cm from 3.6 cm earlier.     Left breast-parenchymal density measuring 9 x 10 mm has markedly decreased.  Few adjacent calcifications which are unchanged.  No new abnormality in the left breast.  At 10:00 region there is some minimal adjacent hypoechoic texture which is difficult to measure but appears smaller since the previous exam.     10/28/2021-PET/CT  New L1 and L2 lytic bone metastasis annually intensely hypermetabolic focus at the left adrenal gland likely represents metastatic disease as well.  Slight decrease in size of the 0.9 x 0.7 cm left upper lobe pulmonary nodule.  There is hypermetabolic activity related to radiation pneumonitis.  The remainder of the nodule is photopenic.  Uncertain etiology of the more intense activity along the right lateral peripheral margin of the 2.6 cm postbiopsy density of the right breast.     She completed radiation to to the lumbar spine on 11/19/2021 11/22/2021-cycle 1 Alimta and  Keytruda     3/23/2022-bilateral diagnostic mammogram and ultrasound  Complete resolution of the microcalcifications in the upper outer quadrant of the right breast and decrease in size of the postbiopsy hematoma.  No suspicious abnormalities in the right breast.  Benign-appearing calcifications at the site of malignancy in the left breast upper inner quadrant.  No other suspicious findings.     4/11/2022 PET/CT-there is new groundglass opacities in the left upper lobe which are most likely postradiation changes.  Other groundglass opacities in the left lung as well as the right lung remained stable.  Increased nodularity of the left adrenal gland with an SUV of 5.6, previously 3.5.    Increased uptake in the L1 vertebra in the right posterior aspect with an SUV of 3.6.  Left L1 sclerosis increased     Due to this the case was discussed in multidisciplinary conference.  The disease progression was significant in the left adrenal gland as well as the L1 vertebra.  Since there were only 2 areas of disease progression it has been decided to proceed with radiation to these 2 spots and continue on Keytruda and Alimta.     Patient also has had worsening of her kidney function.  We initially held treatment as of 5/31/2022 and creatinine bumped up to 2.0, BUN 23.  We then resumed therapy 6/11/2022 with Keytruda alone.  Patient was referred to nephrology.     6/6/2022-MRI of the spine-diffuse marrow replacement in the L1 vertebral body and inferior endplate concavity.  Suspicious for metastatic disease with pathological fracture in the inferior endplate.  Also diffuse signal abnormality in the L2 lamina on the left suspicious for additional metastatic disease.  Abnormality of the first sacral segment suspicious for metastatic disease.  Left adrenal gland appears enlarged.  30 to 40% height loss and L2 vertebral body suggestive of a subacute compression fracture.  Degenerative changes.     7/18/2022-PET/CT  Multiple  hypermetabolic osseous lesions with index lesions which have either increased in degree of FDG uptake or newly hypermetabolic lesions representative of metastatic disease and progression of disease.  Possible FDG avid lesion in the left femoral diaphysis however these are incompletely visualized and in the area of artifactual FDG uptake.  MRI of the left femur recommended for further evaluation.  Increasing FDG uptake of the left adrenal gland.  Focal left hilar hypermetabolic him corresponds to the lymph node which has minimally increased in size compared to April 2022.  New sub-6 mm pulmonary nodules.     7/26/2022-cycle 1 of Taxotere     Patient was admitted to the hospital 8/3/2021 discharged on 8/9/2022.  She was admitted for strokelike symptoms and confusion.  The confusion was thought to be secondary to polypharmacy and probably febrile neutropenia.  She was treated with antibiotics.  Mental status improved subsequently.  MRI of the cervical thoracic and lumbar spine was performed on 8/4/2022.  Images independently reviewed by me.  Multiple metastatic lesions in the spine noted.  There was a lesion on the CT which was concerning.  There is also a sacral Lesion measuring up to 5 cm in transverse dimension on the right.  Patient was symptomatic with pain on the right side of the sacrum.  Radiation oncology recommended radiation to the cervical spine as well as sacrum.  CT head without any obvious abnormalities.     Completed radiation to the cervical spine and sacrum on 8/12/2022    She was admitted to the hospital between 9/23/2022 to 10/12/2022.  She was admitted for poorly controlled pain as well as encephalopathy and multiple falls.  She was diagnosed with a UTI and treated with IV Rocephin.  Pain medications changed and also additional radiation to the right sacrum was performed.  3000 cGy in 10 fractions was administered.  She was discharged on oxycodone 15 mg every 4 hours as needed for pain and  OxyContin twice daily.  She was recommended to go to a subacute rehab however she preferred to be discharged home with home physical therapy and Occupational Therapy    9/24/2022-MRI of the pelvis showed osseous metastatic disease in the sacrum worst within the right and probable pathological fractures bilaterally.    10/4/2022 CT head without any evidence of metastatic disease or acute abnormalities.    10/25/2022-PET/CT-resolution of hypermetabolic activity in the upper lobe and left hilum.  Previously noted new tiny pulmonary nodules have resolved.  Resolution of hypermetabolic activity in the left adrenal gland.  Near complete resolution of hypermetabolic activity in the skeletal metastasis particularly the cervical spine, pelvic bones and proximal left femur.  Maximal SUV at the right sacral metastasis is 2.7 and previously it was 8.9.  Tiny focus of hypermetabolic activity at the GE junction with a maximal SUV of 4.7.  No definite thickening or abnormality noted on the CT.    PHYSICAL EXAMINATION:    General: NAD, alert, somewhat confused  HEENT: Right periorbital hematoma still present.  Chest/Lungs: Clear to auscultation bilaterally. Right chest mediport in place  Heart: RRR  Abdomen/GI: soft, NT, ND, NABS  Extremities: Bilateral lower extremity without any swelling.  Decree strength in both lower extremities.  No focal deficits.    I have reexamined the patient and the results are consistent with the previously documented exam. Salma Snell MD       DIAGNOSTIC DATA:  Results Review:     I reviewed the patient's new clinical results.    Results from last 7 days   Lab Units 11/18/22  0903   WBC 10*3/mm3 6.73   HEMOGLOBIN g/dL 10.2*   HEMATOCRIT % 33.0*   PLATELETS 10*3/mm3 147     Lab Results   Component Value Date    NEUTROABS 5.26 11/18/2022                     IMAGING:      PET/CT 10/25/2022-images independently reviewed and interpreted by me in detail summarized above    ASSESSMENT:  This is a 57 y.o.  female with:     *Metastatic non-small cell lung cancer  · She was initially diagnosed with stage III (T1N2M0) disease.    · She received initial therapy with cisplatin and Alimta concurrent with radiation therapy beginning 5/25/2021.    · There were indeterminate findings in the lumbar spine at L1 and it was recommended to monitor this over time.    · Patient completed 3 cycles cisplatin and Alimta 7/7/2021 and completed radiation therapy 7/12/2021.    · PET scan 8/6/2021 showed good response to treatment.    · MRI lumbar spine 10/1/2021 with increase in size of the L1 lesion.    · Biopsy of the L1 lesion on 10/14/2022 confirmed metastatic adenocarcinoma of lung origin, PD-L1 TPS 0.    · PET scan 10/26/2021 with involvement of left adrenal and L1/L2 only.    · Patient received radiation therapy to L1/L2 on 11/19/2021.  She also received radiation to the left adrenal gland.    · She initiated further systemic therapy on 11/22/2021 with Keytruda and Alimta.   · Follow-up PET scan 1/18/2022 with decrease in small left upper lobe pulmonary nodule, resolution of activity at L1/L2, no new sites of disease.    · Guardant 360 CT DNA level was monitored and was decreasing.    · PET scan 4/11/2022 with progression in left adrenal and L1.    · Maintained systemic therapy with Keytruda and Alimta and received additional radiation to left adrenal and L1.  Alimta however held since 5/10/2022 due to renal dysfunction.  Radiation completed to lumbar spine 7/14/2022.    · PET scan 7/18/2022 with multiple areas of progression of the spine and right sacrum.    · Guardant 360 analysis 7/20/2022 with no actionable mutations.    · Change in therapy to single agent palliative Taxotere initiated 7/26/2022.    · Altered mental status and febrile neutropenia requiring hospitalization 8/2 - 8/9/2022.  During that admission, MRI brain, cervical, thoracic, lumbar spine performed 8/4/2022 in addition to CT cervical spine 8/5/2022.  There was  evidence of C2 metastatic lesion with some dural enhancement, compression deformity at T7 with mild edema suggesting acute to subacute fracture, 5 mm T12 spinous process metastasis, multifocal disease in the lumbar spine and sacrum, largest involving sacral ala to the right 5 cm in transverse dimension.  Loss of height at L2 25%.  · Patient received cycle 3 Taxotere 9/6/2022 with Neulasta support.    · She has been continuing as well on Xgeva every 4 weeks (last received 9/6/2022).  · Patient did undergo MRI lumbar spine and pelvis on 9/24/2022.  MRI pelvis showed bilateral sacral fractures with marrow infiltration related to metastasis more prominent on the right.  Lesion on the right 3.5 x 4.1 x 4.3 cm.  Also probable metastasis along posterior acetabulum on the right 1.5 cm.  MRI lumbar spine with stable L1 metastasis, new edema endplate T12 possibly stress reaction versus developing new metastasis, lesion at T12 spinous process unchanged.   · Radiation oncology consulted with plans for palliative treatment of the right sacral metastasis.  Treatment initiated 9/28/2022  · Completed radiation to the right sacral metastasis on 10/11/2022.  · PET/CT 10/25/2022 without any metabolic uptake in the lung or the skeletal metastasis.  This is indicative of a positive response to Taxotere.  · Pain persistent sitting and walking position and not present when she is laying down.  · Patient is still very active and performance status is very poor.  Due to this reason chemotherapy will be continued to be held.  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  · Due to this reason an MRI of the brain was obtained but unfortunately this was performed without contrast.  Reviewed the MRI and no evidence of metastatic disease.  · 11/15/2022 lumbar puncture shows no evidence of malignancy in the CSF, elevated protein at 65.2, glucose normal at 58, culture no  growth in 3 days  · The negative cytology does not necessarily rule out leptomeningeal carcinomatosis.  · Patient continues to deteriorate in terms of functional status as well as mental status.  · Due to this reason we will repeat an MRI of the brain this time with contrast, also obtain MRI of the cervical thoracic and lumbar spine to assess for leptomeningeal carcinomatosis.  · I also reviewed all the medications again and no clear explanation from a medication standpoint to explain the deterioration in mental status  · We have made an urgent referral to neurology but patient is still not scheduled.  If she is unable to see a neurologist in the [facility] system we will refer her to [facility].  · I also made a referral to pain management.  No appointment scheduled yet.     *History of bilateral stage I breast cancer  · Patient with bilateral breast cancer, both stage I ([id number]), grade 2, ER/KS positive and HER2/melinda negative with low Ki-67.    · Patient initiated adjuvant letrozole in May 2021.  · Continue letrozole for now     *Cancer related pain  · Patient has been receiving Duragesic patch 50 mcg every 72 hours in addition to oxycodone 15 mg every 4 hours for breakthrough pain.  Duragesic dose had been escalated recently in the outpatient setting due to worsening pain  · On admission 9/23/2022, Duragesic patch increased to 75 mcg daily, added Decadron 4 mg IV every 6 hours with continuation of oxycodone 15 mg every 4 hours as needed for breakthrough pain in addition of Dilaudid 1 mg every 2 hours as needed for breakthrough pain.  · Patient with increasing side effects from narcotics, Duragesic decreased on 9/25/2022 from 75 down to 50 mcg patch every 72 hours.  · Dexamethasone dose decreased to 4 mg p.o. every 12 hours on 9/27/2022  · Initiated palliative radiation to the right sacrum on 9/28/2022  · Patient became confused after receiving Dilaudid.  Patient and family asked to discontinue  Dilaudid.  · Duragesic patch dose was increased to 75 mcg/h on 9/30/2022. Subsequent decrease to 50 mcg due to confusion  · Gabapentin was discontinued due to the sedating effect.  · Oxycodone is being used for breakthrough pain.  · 10/3 transition to sustained release oxycodone 40 mg bid and fentanyl patch stopped  · Poorly controlled, pain particularly worse in sitting and standing position.  · Unsure if extends is contributing to mental status changes as previously patient had been very sensitive to pain meds.  · Due to this reason we will discontinue the Xtampza ER and switch to OxyContin 20 mg twice daily.  · No change in mental status with change of pain medications.  · Pain management referral has been made but appointment not scheduled yet.     *Anxiety/depression  · Patient was previously on Wellbutrin  mg daily, Lexapro 10 mg daily.  · On 9/30/2022, she was switched from Lexapro to Cymbalta 30 mg daily.  · Atarax initiated for restlessness, and helping with symptom  · No changes     *Insomnia  · Patient was previously on Xanax but reported that it was not working.  · Patient previously tried melatonin and it was not effective.  · She was placed on Restoril 15 mg nightly on 10/1/2022.  · The patient had 3 falls since last night.  · Its not clear if this is due to Restoril but since this happened after the change, we switched back to Xanax-Home dose of 0.5 mg as needed for sleep.  · She has been anxious regarding the PET/CT results.  She feels much better after learning about the PET/CT results today.  · Hold Xanax as patient reports excessive sleepiness     *Anemia  · Most recent evaluation 8/3/2022 with iron 15, ferritin 276, iron saturation 5%, TIBC 328, folate greater than 20, B12 392  · Anemia felt to be related to malignancy/chronic disease and chemotherapy  · Hemoglobin stable    *Thrombocytopenia  · New since the past 2 visits   · Platelets 106,000  · Unclear etiology  · Improved and now  normal     *Peripheral neuropathy  · Patient was on gabapentin 300 mg twice daily.  · Gabapentin was discontinued on 9/30/2022.  · Continue Cymbalta  · Neuropathy stable     *Narcotic induced constipation  · Patient is on Sybil-Colace 2 tablets twice daily with MiraLAX daily   · Has been constipated, resume bowel regimen which was previously being held due to diarrhea     *GI prophylaxis  · Protonix 40 mg daily        *Elevated liver labs  · Atorvastatin was discontinued  · LFTs normal    *BLE calf pain  · BLE venous duplex negative. Exam reassurring    *Fall with right scalp hematoma  · Improving    *Deconditioning  · Patient continues to be very sedentary and in bed most of the time.  · She is progressively getting weaker and I do not believe that she will be able to tolerate chemotherapy with her current functional status.  · Recommend physical therapy and Occupational Therapy..    RECOMMENDATIONS:  1. Completed radiation 10/11/2022  2. Continue sustained-release oxycodone to 20 mg twice daily  3. Cymbalta at 30 mg daily for neuropathy, continue  4. 1 L normal saline today  5. Referred to pain management  6. Urgent referral to neurology has already been placed  7. MRI of the brain with contrast, MRIs of the thoracic lumbar and cervical spine with contrast      56 minutes have been spent on the encounter including reviewing the medical records, face-to-face time and documentation on the same day.      Salma Snell MD

## 2022-11-18 NOTE — TELEPHONE ENCOUNTER
Received a message from Dr. Dowd stating that this patient needs to be seen urgently per heme/onc for lower extremity weakness.      Spoke with Marcelle, the  at [facility] in [place] as Dr. Lora has the soonest availability.  OK to schedule per Marcelle.    Attempted to reach the patient to offer an appointment for next week at [place].  Left message to call back.

## 2022-11-18 NOTE — TELEPHONE ENCOUNTER
Spoke with patient's .  They are agreeable to an appointment in [place].  Scheduled patient for Monday.

## 2022-11-21 ENCOUNTER — HOSPITAL ENCOUNTER (OUTPATIENT)
Dept: MRI IMAGING | Facility: HOSPITAL | Age: 57
Discharge: HOME | End: 2022-11-21

## 2022-11-21 ENCOUNTER — TRANSCRIBE ORDERS (OUTPATIENT)
Dept: ADMINISTRATIVE | Facility: HOSPITAL | Age: 57
End: 2022-11-21

## 2022-11-21 ENCOUNTER — TELEPHONE (OUTPATIENT)
Dept: ONCOLOGY | Facility: CLINIC | Age: 57
End: 2022-11-21

## 2022-11-21 ENCOUNTER — OFFICE VISIT (OUTPATIENT)
Dept: NEUROLOGY | Facility: CLINIC | Age: 57
End: 2022-11-21

## 2022-11-21 ENCOUNTER — HOSPITAL ENCOUNTER (OUTPATIENT)
Dept: MRI IMAGING | Facility: HOSPITAL | Age: 57
End: 2022-11-21

## 2022-11-21 DIAGNOSIS — R29.818 NEUROLOGICAL DEFICIT PRESENT: ICD-10-CM

## 2022-11-21 DIAGNOSIS — C34.12 PRIMARY ADENOCARCINOMA OF UPPER LOBE OF LEFT LUNG: ICD-10-CM

## 2022-11-21 DIAGNOSIS — R41.0 CONFUSION: ICD-10-CM

## 2022-11-21 DIAGNOSIS — Z45.2 ENCOUNTER FOR FITTING AND ADJUSTMENT OF VASCULAR CATHETER: ICD-10-CM

## 2022-11-21 DIAGNOSIS — Z17.0 MALIGNANT NEOPLASM OF UPPER-OUTER QUADRANT OF RIGHT BREAST IN FEMALE, ESTROGEN RECEPTOR POSITIVE: ICD-10-CM

## 2022-11-21 DIAGNOSIS — G62.9 POLYNEUROPATHY: ICD-10-CM

## 2022-11-21 DIAGNOSIS — C34.90 NON-SMALL CELL LUNG CANCER METASTATIC TO BONE: Primary | ICD-10-CM

## 2022-11-21 DIAGNOSIS — M79.2 NEUROPATHIC PAIN: ICD-10-CM

## 2022-11-21 DIAGNOSIS — C79.51 NON-SMALL CELL LUNG CANCER METASTATIC TO BONE: ICD-10-CM

## 2022-11-21 DIAGNOSIS — C79.51 NON-SMALL CELL LUNG CANCER METASTATIC TO BONE: Primary | ICD-10-CM

## 2022-11-21 DIAGNOSIS — C34.90 NON-SMALL CELL LUNG CANCER METASTATIC TO BONE: ICD-10-CM

## 2022-11-21 DIAGNOSIS — C50.411 MALIGNANT NEOPLASM OF UPPER-OUTER QUADRANT OF RIGHT BREAST IN FEMALE, ESTROGEN RECEPTOR POSITIVE: ICD-10-CM

## 2022-11-21 PROCEDURE — 99214 OFFICE O/P EST MOD 30 MIN: CPT | Performed by: PSYCHIATRY & NEUROLOGY

## 2022-11-21 PROCEDURE — 70552 MRI BRAIN STEM W/DYE: CPT

## 2022-11-21 PROCEDURE — A9577 INJ MULTIHANCE: HCPCS | Performed by: INTERNAL MEDICINE

## 2022-11-21 PROCEDURE — 72156 MRI NECK SPINE W/O & W/DYE: CPT

## 2022-11-21 PROCEDURE — 0 GADOBENATE DIMEGLUMINE 529 MG/ML SOLUTION: Performed by: INTERNAL MEDICINE

## 2022-11-21 PROCEDURE — 25010000002 HEPARIN LOCK FLUSH PER 10 UNITS: Performed by: INTERNAL MEDICINE

## 2022-11-21 RX ORDER — HEPARIN SODIUM (PORCINE) LOCK FLUSH IV SOLN 100 UNIT/ML 100 UNIT/ML
500 SOLUTION INTRAVENOUS AS NEEDED
Status: CANCELLED | OUTPATIENT
Start: 2022-11-21

## 2022-11-21 RX ORDER — HEPARIN SODIUM (PORCINE) LOCK FLUSH IV SOLN 100 UNIT/ML 100 UNIT/ML
500 SOLUTION INTRAVENOUS AS NEEDED
Status: DISCONTINUED | OUTPATIENT
Start: 2022-11-21 | End: 2022-11-22 | Stop reason: HOSPADM

## 2022-11-21 RX ORDER — GABAPENTIN 300 MG/1
300 CAPSULE ORAL 3 TIMES DAILY
Qty: 180 CAPSULE | Refills: 3
Start: 2022-11-21 | End: 2022-12-02

## 2022-11-21 RX ORDER — SODIUM CHLORIDE 0.9 % (FLUSH) 0.9 %
10 SYRINGE (ML) INJECTION AS NEEDED
Status: CANCELLED | OUTPATIENT
Start: 2022-11-21

## 2022-11-21 RX ADMIN — GADOBENATE DIMEGLUMINE 12 ML: 529 INJECTION, SOLUTION INTRAVENOUS at 14:34

## 2022-11-21 RX ADMIN — HEPARIN SODIUM (PORCINE) LOCK FLUSH IV SOLN 100 UNIT/ML 500 UNITS: 100 SOLUTION at 14:53

## 2022-11-21 NOTE — TELEPHONE ENCOUNTER
Jelani called to report back after  Neurologist appt.  He said they are recommending she get started back on gabapentin.  He wanted to make sure this was ok with Dr Snell.  Advised him that she is out of the office this week, but I would send her a message and also informed him that I may not get a response back until her return next week.  He voiced understanding.

## 2022-11-21 NOTE — PROGRESS NOTES
"Notes by MA:   Patient worked in as an urgent neurology consult by Dr Dowd of [facility] for BLE weakness.        Subjective:   57-year-old woman with known breast cancer and lung cancer metastatic widely, including metastases to lumbar spine and sacrum.  I reviewed her MRI of the full neuraxis including brain, C-spine, T-spine, and L-spine from August of this year.  Metastases as noted above.  Compression fractures were noted in the lumbar and thoracic spines.  Follow-up MRIs have been ordered and have been started today.  They are unavailable for review at this time.  The 's primary concern is her confusion.  He says that she \"talks out of her head and gets quite agitated\" and less she has been given Xanax which seems to calm her and restore some quality of life.  Her dexamethasone was discontinued several weeks ago and the altered sensorium has persisted.  Her gabapentin was discontinued sometime ago and her altered sensorium has persisted.  Patient ID: Holli Patel is a 57 y.o. female.    History of Present Illness  The following portions of the patient's history were reviewed and updated as appropriate: allergies, current medications, past family history, past medical history, past social history, past surgical history and problem list.    Review of Systems        Objective:    Neurologic Exam  Confused and disoriented.  Speech is intelligible but dysarthric.  Follows simple commands well.  Pupils are symmetric and 4 mm.  They are reactive directly and consensually.  Face is reasonably symmetric.  Tongue is midline.  Motor exam reveals movements of all 4 extremities limited by back and hip pain.  Sensory exam reveals decreased sensation to primary modalities in both distal lower extremities fairly symmetrically as well as both hands, worse on the left than on the right.  Tendon reflexes revealed a left to right crossed adductor with patellar DTR.  Ankle jerks are absent.  Reflexes in the upper " extremities are 1+.  Plantar signs are mute.  No ankle clonus.  Cannot be ambulated.  Wheelchair or bedbound.  Physical Exam    Assessment/Plan:     Diagnoses and all orders for this visit:    1. 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) (Primary)    2. Neuropathic pain  -     Discontinue: gabapentin (NEURONTIN) 300 MG capsule; Take 1 capsule by mouth 3 (Three) Times a Day. TAKE ONE CAPSULES BY MOUTH THREE TIMES A DAY  Dispense: 180 capsule; Refill: 3    3. Confusion    4. Polyneuropathy       57-year-old woman with breast and lung cancer widely metastatic including bony metastases to lumbar spine and sacrum as well as compression fractures in the lumbar and thoracic spine contributing to significant pain and immobility.  She also has significantly altered sensorium likely due to her pain medications although I cannot exclude the possibility of new intracranial metastases.  Her steroids and gabapentin were both discontinued and the altered mental status has not cleared.  The only thing that seems to help is her Xanax which is being provided by her primary care provider.  As she continues to have pain with wearing off of her oxycodone, I am restarting her gabapentin towards 300 mg 3 times daily.  Physical and Occupational Therapy have been ordered but I am not sure she can cooperate given her current state of deconditioning and pain.  Follow-up MRIs of the neuraxis are currently underway today and Monday and are unavailable for review at this time.  I will review those when available and take any further measures that may be necessary.  I discussed all the above at length with the patient and her  and addressed his concerns.  Little else to offer from a neurological standpoint at this time but we would be happy to see her back in neurology at any time.    65 minutes patient care time today.

## 2022-11-22 ENCOUNTER — TELEPHONE (OUTPATIENT)
Dept: FAMILY MEDICINE CLINIC | Facility: CLINIC | Age: 57
End: 2022-11-22

## 2022-11-22 ENCOUNTER — APPOINTMENT (OUTPATIENT)
Dept: MRI IMAGING | Facility: HOSPITAL | Age: 57
End: 2022-11-22

## 2022-11-22 DIAGNOSIS — F41.9 ANXIETY: ICD-10-CM

## 2022-11-22 NOTE — TELEPHONE ENCOUNTER
Requesting refills on   calcitriol (ROCALTROL) 0.5 MCG capsule    ALPRAZolam (XANAX) 0.5 MG tablet    Send to [facility]     Pt states that he would like to get out of bringing her to the appointment in December due to stage 4 spine cancer. He states that bringing her is a huge ordeal.      Please call the patient when the medications are sent in

## 2022-11-22 NOTE — TELEPHONE ENCOUNTER
Caller: BK CARPENTER    Relationship: Emergency Contact    Best call back number: [phone]      What is the best time to reach you: ANYTIME    Who are you requesting to speak with (clinical staff, provider,  specific staff member): MA        What was the call regarding: PATIENTS  IS CALLING IN HE HAS SOME CONCERNS ABOUT PATIENTS MEDICATIONS. HE STATES THAT SHE DOES NOT HAVE ANY REFILLS LEFT ON   MEDICATIONS. AND HE HAS CONCERNS ABOUT GETTING THESE FILLED BEFORE THE HOLIDAY COMES.     HE IS ASKING IF THE NEXT LABS FOR MEDICATIONS THAT SHE WOULD NEED FOR THE REFILL IF SHE CAN GET THEM DONE AT THE [facility] PLACE THAT TAKES HER BLOOD.     HE HAS A VERY HARD TIME GETTING HER IN OUT OF APPTS AND SHE HAS BEEN DECLINING. SHE IS TALKING ABOUT THINGS THAT ARE NOT THERE AND HE HAS HAD TO INCREASE HER XANAX TO CALM HER DOWN FROM HER GETTING WORKED UP. SHE IS NOW TAKING 3  A DAY.     Do you require a callback: YES

## 2022-11-22 NOTE — TELEPHONE ENCOUNTER
Rx Refill Note  Requested Prescriptions     Pending Prescriptions Disp Refills   • ALPRAZolam (XANAX) 0.5 MG tablet 90 tablet 0     Sig: Take 1 tablet by mouth 2 (Two) Times a Day As Needed for Anxiety.      Last office visit with prescribing clinician: 8/30/2022      Next office visit with prescribing clinician: 12/2/2022            North Young MA  11/22/22, 17:07 EST

## 2022-11-22 NOTE — TELEPHONE ENCOUNTER
Jelani states your name is on the bottle for the calcitriol he has no idea who to call to get it can you just please call this in he is giving Holli 3 xanax daily just to get her through she is hallucinating seeing things and get s so anxious and worked up PLEASE help needs refill please asap before weekend/holiday

## 2022-11-22 NOTE — TELEPHONE ENCOUNTER
He wants to get all her labs drawn at the [facility] via [facility] instead of coming in the office to see Dr. Gastelum

## 2022-11-23 ENCOUNTER — TELEPHONE (OUTPATIENT)
Dept: NEUROLOGY | Facility: CLINIC | Age: 57
End: 2022-11-23

## 2022-11-23 RX ORDER — ALPRAZOLAM 0.5 MG/1
0.5 TABLET ORAL 2 TIMES DAILY PRN
Qty: 90 TABLET | Refills: 0 | Status: SHIPPED | OUTPATIENT
Start: 2022-11-23 | End: 2022-12-02 | Stop reason: SDUPTHER

## 2022-11-23 NOTE — TELEPHONE ENCOUNTER
Spoke with [facility] and did a verbal order for the medication it. It did not cross over in the system. Left message to let Jelani know the script would be ready for her there.

## 2022-11-23 NOTE — TELEPHONE ENCOUNTER
Caller: BK    Relationship:     Best call back number: [phone] - PLEASE CALL WHEN COMPLETED    Which medication are you concerned about: gabapentin (NEURONTIN) 300 MG capsule [[id number]]     Order Details  Dose: 300 mg Route: Oral Frequency: 3 Times Daily   Dispense Quantity: 180 capsule Refills: 3          Sig: Take 1 capsule by mouth 3 (Three) Times a Day. TAKE ONE CAPSULES BY MOUTH THREE TIMES A DAY         Start Date: 11/21/22 End Date: --   Written Date: 11/21/22 Expiration Date: 05/20/23       Diagnosis Association: Neuropathic pain (M79.2)   Original Order:  gabapentin (NEURONTIN) 300 MG capsule [[id number]]   Providers    Ordering Provider and Authorizing Provider:    Blaine Lora MD   [address] [facility] [address]   Phone:  [phone]   Fax:  [phone]   NPI:  [id number]       What are your concerns: HE STATES THE PHARMACY NEVER RECEIVED THIS MEDICATION PLEASE RE-SEND TO THE FOLLOWING PHARMACY    [facility] [id number] - [place], [place] - [address] [facility] - [phone]  - [phone] FX

## 2022-11-28 ENCOUNTER — APPOINTMENT (OUTPATIENT)
Dept: MRI IMAGING | Facility: HOSPITAL | Age: 57
End: 2022-11-28

## 2022-11-28 ENCOUNTER — TELEPHONE (OUTPATIENT)
Dept: ONCOLOGY | Facility: CLINIC | Age: 57
End: 2022-11-28

## 2022-11-28 NOTE — TELEPHONE ENCOUNTER
"Jelani has contacted me today reporting Holli has had multiple episodes of \"pooping on herself\"  He reports her stool is runny and black.  She has also had episodes of vomiting.  He reports she's not eating hardly anything.  Only wants to drink coke. He is supposed to take her for MRI today and isn't sure he can get her there.  He reports discontinuing her gabapentin, as after she started back on it she was \"having conversations with someone in her head.\" she is scheduled to go see pain management tomorrow as well.  He called again around 3pm and said he will not be able to get her to the MRI appt.  He attempted to give her 2 pepto bismol prior to the appt so she didn't have a stool while there.  She tried to chew them up and then threw up.  I recommended he take her to the ER.  He wishes to wait until the morning when I can discuss with Dr Snell and see about getting her direct admitted to the hospital.       Since the time of this message I have spoke with Dr Snell and she also recommends Jelani take Holli to the ER for workup.  I called Jelani to let him know this, left a message on his voicemail.   "

## 2022-11-29 ENCOUNTER — TELEPHONE (OUTPATIENT)
Dept: ONCOLOGY | Facility: CLINIC | Age: 57
End: 2022-11-29

## 2022-11-29 NOTE — TELEPHONE ENCOUNTER
"Jelani has contacted me today.  Advised him I returned his call last night and left a message with Dr Snell's recommendations to take Holli to ER.  He expresses concern regarding what can be accomplished in the hospital.  Explained that since he was unable to get her to the scans that were ordered for yesterday, that if she is inpatient she would be able to be transported.  He explains that he feels like each doctor comes by and spends \"2 minutes\" with her and all they do is wait around.  Ultimately I advised him to do what he feels comfortable with.  He has been recommended to take her to the ER.  He has cancelled her appt with pain management for tomorrow.  He states he is going to try to get rescheduled.  She is already scheduled to see Dr Snell on Friday.  Explained if he feels she can wait until then, we can keep her appt scheduled as is.  Labs will be drawn.  Explained that we have limited capability for any further diagnostic workup in the office.   "

## 2022-11-30 ENCOUNTER — APPOINTMENT (OUTPATIENT)
Dept: GENERAL RADIOLOGY | Facility: HOSPITAL | Age: 57
End: 2022-11-30

## 2022-11-30 ENCOUNTER — TELEPHONE (OUTPATIENT)
Dept: ONCOLOGY | Facility: CLINIC | Age: 57
End: 2022-11-30

## 2022-11-30 ENCOUNTER — PREP FOR SURGERY (OUTPATIENT)
Dept: SURGERY | Facility: SURGERY CENTER | Age: 57
End: 2022-11-30

## 2022-11-30 ENCOUNTER — OFFICE VISIT (OUTPATIENT)
Dept: PAIN MEDICINE | Facility: CLINIC | Age: 57
End: 2022-11-30

## 2022-11-30 ENCOUNTER — TELEPHONE (OUTPATIENT)
Dept: FAMILY MEDICINE CLINIC | Facility: CLINIC | Age: 57
End: 2022-11-30

## 2022-11-30 VITALS
BODY MASS INDEX: 24.98 KG/M2 | HEIGHT: 63 IN | OXYGEN SATURATION: 95 % | DIASTOLIC BLOOD PRESSURE: 89 MMHG | TEMPERATURE: 97.7 F | SYSTOLIC BLOOD PRESSURE: 128 MMHG | HEART RATE: 115 BPM

## 2022-11-30 DIAGNOSIS — M48.061 LUMBAR FORAMINAL STENOSIS: ICD-10-CM

## 2022-11-30 DIAGNOSIS — M54.16 LUMBAR RADICULOPATHY: Primary | ICD-10-CM

## 2022-11-30 DIAGNOSIS — E78.2 MIXED HYPERLIPIDEMIA: Primary | ICD-10-CM

## 2022-11-30 PROCEDURE — 99204 OFFICE O/P NEW MOD 45 MIN: CPT | Performed by: ANESTHESIOLOGY

## 2022-11-30 RX ORDER — SODIUM CHLORIDE 0.9 % (FLUSH) 0.9 %
10 SYRINGE (ML) INJECTION AS NEEDED
Status: CANCELLED | OUTPATIENT
Start: 2022-11-30

## 2022-11-30 RX ORDER — SODIUM CHLORIDE 0.9 % (FLUSH) 0.9 %
10 SYRINGE (ML) INJECTION EVERY 12 HOURS SCHEDULED
Status: CANCELLED | OUTPATIENT
Start: 2022-11-30

## 2022-11-30 NOTE — TELEPHONE ENCOUNTER
----- Message from Brooklynn Powell RN sent at 11/18/2022  3:41 PM EST -----  We referred her to neurology and pain management.  She has been scheduled for neurology in January and she cannot wait that long.  Can we please contact their office for a sooner appt.  If they are unable to get her in, we will refer her to [facility]  Same with pain management, referral says 'ready to schedule' but they have not been contacted for an appt.  Both of these are urgent, she has uncontrolled pain and significant neurological changes.    Thank you!!!

## 2022-11-30 NOTE — TELEPHONE ENCOUNTER
Caller: BK CARPENTER    Relationship: Emergency Contact    Best call back number: [phone]    What orders are you requesting (i.e. lab or imaging): LABS     In what timeframe would the patient need to come in: ASAP     Where will you receive your lab/imaging services:     Additional notes: SPOUSE STATES PATIENT USUALLY GETS HER LAB WORK DONE FOR DR. SANDOVAL AT 'S OFFICE WHILE THEY HAVE THE PORT OPEN BECAUSE IT IS VERY HARD TO FIND HER VEINS. PATIENT IS NEEDING THOSE ORDERS SENT OVER BEFORE HER APPOINTMENT ON Friday 12.01.22 AT 10:40 AM.     PLEASE CALL SPOUSE WHEN ORDERS ARE SENT

## 2022-11-30 NOTE — PROGRESS NOTES
The patient has a pain history of the following:  Non-small cell lung cancer with bone mets   Right breast neoplasm   Thoracic (T7, T12)and lumbar compression fractures   Left 7th & 8th rib fracture  Right sacral lesion    Previous interventions that the patient has received include:   None     Pain medications include:  Cymbalta  Oxycodone ER 20mg BID  Oxycodone IR 15mg q4h prn   Gabapentin    Previously: Dexamethasone, Fentanyl patch, Dilaudid PO    Other conservative modalities which the patient reports using include:  Physical Therapy: no  Chiropractor: no  Massage Therapy: no  TENS: no  Neck or back surgery: no  Past pain management: no    Past Significant Surgical History:  None     HPI:       CHIEF COMPLAINT: Cancer Pain    Holli Patel is a 57 y.o. female referred here by Salma Snell MD. Holli Patel presents to the office for evaluation and treatment of Cancer Pain      History of Present Illness  Onset:  3-4 months it worsened   Inciting Event:  Cancer?  Location:  Back and legs   Pain: Pain described as crippling. Located in her back and does radiate into her legs.  Ambulates: In a wheelchair       Her  provided history today during her visit.  He states that she was making mac and cheese for her grandchildren in July, now she is unable to sit and definitely unable to stand and walk.  She is reliant on her  to complete her ADLs for her.  She is unable to describe her pain, however he states that as soon as he tries to stand her up she complains of leg pain.  He says that over the last few months, he has even noticed her right lower extremity is smaller than the left, and he feels that it is withering away.  She never complains of upper extremity pain.  She takes her pain medications as prescribed, but was recommended to be seen here by her oncologist to discuss options of some type of nerve block that could help with her low back and legs.       Current Outpatient Medications:    •  ALPRAZolam (XANAX) 0.5 MG tablet, Take 1 tablet by mouth 2 (Two) Times a Day As Needed for Anxiety., Disp: 90 tablet, Rfl: 0  •  buPROPion XL (WELLBUTRIN XL) 150 MG 24 hr tablet, Take 1 tablet by mouth Daily., Disp: 30 tablet, Rfl: 5  •  escitalopram (Lexapro) 10 MG tablet, Take 1 tablet by mouth Daily., Disp: 30 tablet, Rfl: 2  •  folic acid (FOLVITE) 1 MG tablet, TAKE ONE TABLET BY MOUTH DAILY, Disp: 30 tablet, Rfl: 3  •  letrozole (FEMARA) 2.5 MG tablet, Take 1 tablet by mouth Daily., Disp: 90 tablet, Rfl: 3  •  levothyroxine (SYNTHROID, LEVOTHROID) 25 MCG tablet, Take 1 tablet by mouth Daily. (Patient taking differently: Take 25 mcg by mouth Daily. Patient states she no longer takes this medication), Disp: 90 tablet, Rfl: 1  •  omeprazole (PrilOSEC) 20 MG capsule, Take 1 capsule by mouth 2 (Two) Times a Day., Disp: 180 capsule, Rfl: 1  •  oxyCODONE (oxyCONTIN) 20 MG 12 hr tablet, Take 1 tablet by mouth Every 12 (Twelve) Hours., Disp: 60 tablet, Rfl: 0  •  oxyCODONE (ROXICODONE) 15 MG immediate release tablet, Take 1 tablet by mouth Every 4 (Four) Hours As Needed for Moderate Pain., Disp: 120 tablet, Rfl: 0  •  simvastatin (ZOCOR) 20 MG tablet, Take 1 tablet by mouth Daily., Disp: 90 tablet, Rfl: 1  •  DULoxetine (CYMBALTA) 30 MG capsule, Take 1 capsule by mouth Every Night for 21 doses., Disp: 21 capsule, Rfl: 0  •  gabapentin (NEURONTIN) 300 MG capsule, Take 1 capsule by mouth 3 (Three) Times a Day. TAKE ONE CAPSULES BY MOUTH THREE TIMES A DAY, Disp: 180 capsule, Rfl: 3  •  rizatriptan (MAXALT) 10 MG tablet, Take 1 tablet by mouth 1 (One) Time for 1 dose. AT ONSET OF HEADACHE MAY REPEAT ONE TABLET IN 2 HOURS IF NEEDED, Disp: 12 tablet, Rfl: 5    The following portions of the patient's history were reviewed and updated as appropriate: allergies, current medications, past family history, past medical history, past social history, past surgical history and problem list.      REVIEW OF PERTINENT MEDICAL  DATA    11/21/22 MRI Spine Cervical WO W     HISTORY:  History of lung/breast/bone cancer for one year status post radiation treatment, confusion for one month with fall approximately one month ago, hallucinations, confusion, 2-3 weeks, numbness and loss of strength in hands over last 2-3 weeks     TECHNIQUE:  Multiplanar multisequence imaging of the cervical spine acquired without and with IV contrast utilizing a standard protocol. A total of 12 cc MultiHance IV contrast was administered.      COMPARISON: MRI of the cervical spine without and with IV contrast 8/4/2022     FINDINGS:     Exam is degraded by motion which moderately compromises evaluation. Subtle findings could be potentially missed as a result. Allowing for this:     Redemonstration of a STIR signal hyperintense enhancing lesion involving the midline arch of C2 extending to the right posterior C2 ring. There is associated postcontrast enhancement. In the setting of cancer this may represent a metastatic bony lesion.     Mild multilevel degenerative changes in the cervical spine is not appreciably different as compared to August 2022. There is no high-grade spinal canal narrowing identified. Evaluation of multilevel foraminal narrowing is suboptimal due to motion but no  obvious high-grade foraminal narrowing is visualized allowing for this limitation.     IMPRESSION:     Motion degraded exam could obscure pathology. Allowing for this:.     1.  Redemonstration of a STIR signal hyperintense enhancing lesion involving the midline arch of C2 extending to the right posterior C2 ring. There is associated postcontrast enhancement. In the setting of cancer this may represent a metastatic bony  lesion. Notably this is not appreciably different as compared to 8/4/2022.  2.  No additional bony lesion is identified.  3.  No suspicious cervical cord signal abnormality or suspicious cervical cord enhancing lesion.     Signer Name: KELLIE MAGDALENO MD   Signed: 11/21/2022  3:44 PM   Workstation Name: [id number]    [facility]    9/24/22 Patient: TANIYA CARPENTER  Time Out: 18:46  Exam(s): MRI PELVIS W WO Contrast      EXAM:    MR Pelvis Without and With Intravenous Contrast     CLINICAL HISTORY:     Reason for exam: Metastatic lung cancer, progressive low back pelvic   pain.     TECHNIQUE:    Multiplanar magnetic resonance images of the pelvis without and with   intravenous contrast.     COMPARISON:    No relevant prior studies available.     FINDINGS:    Bowel:  Unremarkable.  No obstruction.  No mucosal thickening.    Bladder:  Unremarkable.  No stones.  No mass.    Reproductive:  Unremarkable as visualized.    Bones joints: Bilateral sacral fractures are present, with marrow   infiltration, and findings are likely related to osseous metastasis, more   prominent on the right, contributing to a pathologic fracture.  The   probable osseous metastasis on the right measures on the order of 3.5 x 4.  1 x 4.3 cm in size.  No substantial displacement.  Evaluation is   technically limited secondary to patient motion.  This does obscure some   of the anatomic detail.     Also identified is a probable osseous metastasis along the posterior   aspect of the acetabulum, on the right, and this measures on the order of   1.5 cm in diameter.       Soft tissues:  Unremarkable.    Vasculature:  Unremarkable.  No lower abdominal aortic aneurysm.    Lymph nodes:  Unremarkable.  No enlarged lymph nodes.     IMPRESSION:          Osseous metastasis present within the sacrum, worst within the right   sacral ala, and there are probable pathologic fractures bilaterally.    Also identified is a posterior column right acetabular osseous metastasis.     IMPRESSION:        Electronically signed by Ariel Cotto D.O. on 09-24-22 at 6716    8/4/22 MRI EXAMINATION OF THE BRAIN, CERVICAL SPINE, THORACIC SPINE AND LUMBAR  SPINE WITH AND WITHOUT CONTRAST     HISTORY:  Ataxia, confusion, lung and  breast cancer.     COMPARISON: CT head 08/03/2022, MRI lumbar spine 06/06/2022, MRI brain  10/26/2021, MRI thoracic spine 05/22/2021 and CT examination of the  cervical spine 02/11/2020.     MRI EXAMINATION OF BRAIN WITH AND WITHOUT CONTRAST:     TECHNIQUE: A MRI examination of the brain was performed before and after  the intravenous administration of contrast utilizing sagittal T1, axial  diffusion, T1, T2, T2 FLAIR, gradient echo T2 as well as sagittal, axial  and coronal T1 postcontrast weighted sequences.     FINDINGS: There is no evidence of restricted diffusion to suggest acute  infarction. There is expected flow-void in the basilar artery and in the  distal aspect of the internal carotid arteries bilaterally on the axial  T2 sequence. Small scattered foci of T2 FLAIR hyperintensity are  appreciated involving the white matter of the cerebral hemispheres  bilaterally consistent with mild small vessel ischemic disease and  lacunar infarcts. On the MRI examination 10/26/2021 there was a subacute  infarct involving the corona radiata on the right measuring 8 mm in  size. On the current examination, a small area of encephalomalacia is  appreciated measuring 5 mm in size at the same location. There was no  evidence of abnormal enhancement to suggest intracranial metastatic  disease.     IMPRESSION:  The study is hampered somewhat by patient motion but there  was no evidence of enhancement to suggest calvarial or intra-axial  metastatic disease. Mild small vessel ischemic disease is noted. There  is no evidence of acute infarction.        MRI EXAMINATION OF THE CERVICAL SPINE WITH AND WITHOUT CONTRAST:     The study is hampered by patient motion.     The alignment of the cervical spine is within normal limits. Signal  intensity within the cord is normal on the sagittal T2 sequence.     The sagittal T2 sequence demonstrates increased signal intensity  involving the lamina of C2 on the right, extending to and  involving the  spinous process as well as crossing midline to the left involving the  posterior aspect of the C2 lamina on the left. The fat-saturated  sequence demonstrates increased signal intensity involving the soft  tissues adjacent to the lamina and spinous process of C2. The area of T2  hyperintensity within the soft tissues measures approximately 1.5 cm in  cranial caudal dimension and 1.6 cm in AP dimension. After contrast  administration.  There was enhancement of the lamina of C2 bilaterally,  more prominent on the right as well as involving the spinous process.  There is dural enhancement extending from the level of the inferior  aspect of C2 to the inferior aspect of C1 posteriorly. The axial T1  noncontrast sequence demonstrates linear areas of signal loss involving  the posterior laminar bilaterally. Pathologic fractures cannot be  excluded.     C2-3: There is no evidence of disc bulge or herniation.     C3-4: There is no evidence of disc bulge or herniation.     C4-5: A minimal central disc bulge is present.     C5-6: Mild facet degenerative disease is present bilaterally.     C6-7: There is no evidence of disc bulge or herniation.     C7-T1: There is no evidence of disc bulge or herniation.     IMPRESSION:  1.  Edema and enhancement involving the lamina of C2 to the right  extending to and involving the spinous process and posterior aspect of  the lamina of C2 is appreciated consistent with metastatic disease.  There is also adjacent edema and enhancement within the soft tissues  adjacent to the posterior elements of C2. There is mild dural  enhancement posteriorly from the inferior aspect of the posterior arch  of C1 to the inferior aspect of C2. The appearance is nonspecific. The  degree of T2 hyperintensity and enhancement is more than typically seen  with a metastatic lesion. The axial T1 precontrast sequence demonstrates  linear areas of signal loss involving the lamina bilaterally.  Fractures  cannot be excluded. A CT examination of the cervical spine without  contrast is suggested.  2.  Mild degenerative disease involving the cervical spine as described  with no evidence of disc herniation.  3.  No evidence of cord signal abnormality or cord compression.        MRI EXAMINATION OF THE THORACIC SPINE WITH AND WITHOUT CONTRAST:     The alignment of the thoracic spine is within normal limits. There is a  compression deformity involving the T7 vertebral body with loss of  approximately 50% of vertical height anteriorly. Loss of vertical height  appears similar to the PET examination of 07/18/2022, the PET  examination of 04/11/2022, but is new versus the PET examination of  01/18/2022. There was mild edema and enhancement involving the superior  endplate of T7 anteriorly.     A 5 mm area of enhancement is appreciated involving the superior aspect  of the spinous process of T12 consistent with a metastatic lesion.     There is no evidence of disc herniation, canal stenosis or cord  compression. No other areas of enhancement to suggest metastatic disease  are identified.     IMPRESSION:  1.  There is a compression deformity involving T7 with mild edema and  enhancement suggesting an acute to subacute fracture involving the  superior endplate anteriorly. This is new as compared to the PET  examination of 01/18/2022 but loss of vertical height appears similar to  the PET examination of 04/11/2022. The appearance is nonspecific. This  may represent a benign osteoporotic compression fracture. Underlying  metastatic disease cannot be entirely excluded.  2.  There is a 5 mm metastatic lesion involving the superior aspect of  the spinous process of T12.  3.  No evidence of intradural enhancement, cord compression or cord  signal abnormality. There is no evidence of disc herniation.        MRI EXAMINATION OF THE LUMBAR SPINE WITH AND WITHOUT CONTRAST:     The alignment of the lumbar spine is within normal  limits. There is  increased signal intensity involving the marrow of the lumbar spine  which suggests radiation related changes. Signal loss throughout the L1  vertebral body is noted on the T1 sequence consistent with metastatic  disease. There is a mild concave deformity involving the inferior  endplate of L1 and to a lesser extent superior endplate of L1. A  fracture plane is noted paralleling the inferior endplate. Edema tracks  into the pedicles bilaterally. Signal loss and enhancement is  appreciated involving the superior articulating facet of L2 to the left  consistent with metastatic disease. A 5 mm metastatic lesion involving  the spinous process of T12 superiorly is again noted. A large metastatic  lesion is appreciated involving the sacral ala on the right. This  measures approximately 5 cm in transverse dimension.     The conus is at T12-L1 and the caudal aspect of the spinal cord appears  unremarkable.     T12-L1: There is no evidence of disc bulge or herniation.     L1-L2: A mild broad-based disc osteophyte complex is present which is  more prominent to the right.     L2-L3: There is no evidence of disc bulge or herniation.     L3-L4: A mild broad-based disc bulge is present with no evidence of  herniation.     L4-L5: Mild facet degenerative disease is present bilaterally. A mild  central disc osteophyte complex is present with no evidence of  herniation.     L5-S1: Moderate-to-severe facet degenerative disease is present  bilaterally. Moderate foraminal stenosis is present on the right and  there is moderate-to-severe foraminal stenosis on the left secondary to  loss of disc height and extension of a disc osteophyte complex into the  neural foramen.     IMPRESSION:  1.  Multifocal metastatic disease involving the lumbar spine and sacrum  is noted as described above with the largest lesion involving the sacral  ala to the right measuring approximately 5 cm in transverse dimension.  Metastatic  "involvement of L1 is appreciated throughout the vertebral  body and extending to the pedicles bilaterally. There is no evidence of  epidural extension. Smaller metastatic foci are appreciated involving  the superior articulating facet of L1 to the left and the superior  aspect of the spinous process of T12.  2.  Loss of vertical height at L2 is appreciated estimated to be  approximately 25% in severity. There is edema paralleling the superior  endplate. The mild compression deformity at L2 is new versus the MRI  examination of 10/01/2021.     The above information was called to and discussed with Dr. Jade on  08/04/2022 at 1240 hours.     This report was finalized on 8/5/2022 9:05 AM by Dr. Brenton Parham M.D.    11/18/22 Creatinine 1.09, Platelets 147 (10*3)    Review of Systems   Constitutional: Negative for fever.   HENT: Negative for congestion.    Eyes: Negative for redness.   Respiratory: Negative for cough.    Cardiovascular: Negative for leg swelling.   Gastrointestinal: Positive for nausea. Negative for vomiting.   Genitourinary: Negative for difficulty urinating.   Musculoskeletal: Positive for arthralgias and back pain.   Neurological: Positive for weakness (bilateral legs).   Psychiatric/Behavioral: Negative for sleep disturbance.     I have reviewed and confirmed the accuracy of the ROS as documented by the MA/LPN/RN Breann Santiago MD      Vitals:    11/30/22 1008   BP: 128/89   Pulse: 115   Temp: 97.7 °F (36.5 °C)   SpO2: 95%   Height: 160 cm (62.99\")         Objective   Physical Exam  Vitals reviewed.   Constitutional:       Comments: Awake and responsive to verbal ques   Pulmonary:      Effort: Pulmonary effort is normal. No respiratory distress.   Musculoskeletal:      Comments: Negative pain with bilateral straight leg test.  Severe pain with sitting and standing.         Assessment & Plan   Diagnoses and all orders for this visit:    1. Lumbar radiculopathy (Primary)  -     Case Request    2. " Lumbar foraminal stenosis  -     Case Request        - Pertinent imaging reviewed.   - Pertinent labs reviewed.   - Will schedule for bilateral L5-S1 Transforaminal epidural steroid injection.  Risks discussed including but not limited to bleeding, bruising, infection, damage to surrounding structures, headache, and rare things such as being paralyzed, seizure, stroke, heart attack and death.  The risk of steroid medications include but are not limited to immunosuppression, which can increase the risk of erlinda an infectious disease as well as decrease the immune response to a vaccine.  Explained that her MRI does show foraminal stenosis at the L5-S1 level, which could explain a component of her low back and lower extremity pain.  Her , who makes medical decisions for her, would like to proceed with this injection and attempt to better control her pain.  - Patient screened positive for depression based on a PHQ-9 score of 13 on 8/19/2022. Follow-up recommendations include: PCP managing depression.  -Her pain medication is prescribed by her oncologist.    --- Follow-up for bilateral L5-S1 Transforaminal epidural steroid injection and office visit 3-4 weeks.            While examining this patient, I wore protective equipment including a mask and gloves.  I washed my hands before and after this patient encounter.  The patient wore a mask throughout the visit as well.     Breann Santiago MD  Pain Management    EMR Dragon/Transcription disclaimer:   Much of this encounter note is an electronic transcription/translation of spoken language to printed text. The electronic translation of spoken language may permit erroneous, or at times, nonsensical words or phrases to be inadvertently transcribed; Although I have reviewed the note for such errors, some may still exist.

## 2022-11-30 NOTE — H&P (VIEW-ONLY)
The patient has a pain history of the following:  Non-small cell lung cancer with bone mets   Right breast neoplasm   Thoracic (T7, T12)and lumbar compression fractures   Left 7th & 8th rib fracture  Right sacral lesion    Previous interventions that the patient has received include:   None     Pain medications include:  Cymbalta  Oxycodone ER 20mg BID  Oxycodone IR 15mg q4h prn   Gabapentin    Previously: Dexamethasone, Fentanyl patch, Dilaudid PO    Other conservative modalities which the patient reports using include:  Physical Therapy: no  Chiropractor: no  Massage Therapy: no  TENS: no  Neck or back surgery: no  Past pain management: no    Past Significant Surgical History:  None     HPI:       CHIEF COMPLAINT: Cancer Pain    Holli Patel is a 57 y.o. female referred here by Salma Snell MD. Holli Patel presents to the office for evaluation and treatment of Cancer Pain      History of Present Illness  Onset:  3-4 months it worsened   Inciting Event:  Cancer?  Location:  Back and legs   Pain: Pain described as crippling. Located in her back and does radiate into her legs.  Ambulates: In a wheelchair       Her  provided history today during her visit.  He states that she was making mac and cheese for her grandchildren in July, now she is unable to sit and definitely unable to stand and walk.  She is reliant on her  to complete her ADLs for her.  She is unable to describe her pain, however he states that as soon as he tries to stand her up she complains of leg pain.  He says that over the last few months, he has even noticed her right lower extremity is smaller than the left, and he feels that it is withering away.  She never complains of upper extremity pain.  She takes her pain medications as prescribed, but was recommended to be seen here by her oncologist to discuss options of some type of nerve block that could help with her low back and legs.       Current Outpatient Medications:    •  ALPRAZolam (XANAX) 0.5 MG tablet, Take 1 tablet by mouth 2 (Two) Times a Day As Needed for Anxiety., Disp: 90 tablet, Rfl: 0  •  buPROPion XL (WELLBUTRIN XL) 150 MG 24 hr tablet, Take 1 tablet by mouth Daily., Disp: 30 tablet, Rfl: 5  •  escitalopram (Lexapro) 10 MG tablet, Take 1 tablet by mouth Daily., Disp: 30 tablet, Rfl: 2  •  folic acid (FOLVITE) 1 MG tablet, TAKE ONE TABLET BY MOUTH DAILY, Disp: 30 tablet, Rfl: 3  •  letrozole (FEMARA) 2.5 MG tablet, Take 1 tablet by mouth Daily., Disp: 90 tablet, Rfl: 3  •  levothyroxine (SYNTHROID, LEVOTHROID) 25 MCG tablet, Take 1 tablet by mouth Daily. (Patient taking differently: Take 25 mcg by mouth Daily. Patient states she no longer takes this medication), Disp: 90 tablet, Rfl: 1  •  omeprazole (PrilOSEC) 20 MG capsule, Take 1 capsule by mouth 2 (Two) Times a Day., Disp: 180 capsule, Rfl: 1  •  oxyCODONE (oxyCONTIN) 20 MG 12 hr tablet, Take 1 tablet by mouth Every 12 (Twelve) Hours., Disp: 60 tablet, Rfl: 0  •  oxyCODONE (ROXICODONE) 15 MG immediate release tablet, Take 1 tablet by mouth Every 4 (Four) Hours As Needed for Moderate Pain., Disp: 120 tablet, Rfl: 0  •  simvastatin (ZOCOR) 20 MG tablet, Take 1 tablet by mouth Daily., Disp: 90 tablet, Rfl: 1  •  DULoxetine (CYMBALTA) 30 MG capsule, Take 1 capsule by mouth Every Night for 21 doses., Disp: 21 capsule, Rfl: 0  •  gabapentin (NEURONTIN) 300 MG capsule, Take 1 capsule by mouth 3 (Three) Times a Day. TAKE ONE CAPSULES BY MOUTH THREE TIMES A DAY, Disp: 180 capsule, Rfl: 3  •  rizatriptan (MAXALT) 10 MG tablet, Take 1 tablet by mouth 1 (One) Time for 1 dose. AT ONSET OF HEADACHE MAY REPEAT ONE TABLET IN 2 HOURS IF NEEDED, Disp: 12 tablet, Rfl: 5    The following portions of the patient's history were reviewed and updated as appropriate: allergies, current medications, past family history, past medical history, past social history, past surgical history and problem list.      REVIEW OF PERTINENT MEDICAL  DATA    11/21/22 MRI Spine Cervical WO W     HISTORY:  History of lung/breast/bone cancer for one year status post radiation treatment, confusion for one month with fall approximately one month ago, hallucinations, confusion, 2-3 weeks, numbness and loss of strength in hands over last 2-3 weeks     TECHNIQUE:  Multiplanar multisequence imaging of the cervical spine acquired without and with IV contrast utilizing a standard protocol. A total of 12 cc MultiHance IV contrast was administered.      COMPARISON: MRI of the cervical spine without and with IV contrast 8/4/2022     FINDINGS:     Exam is degraded by motion which moderately compromises evaluation. Subtle findings could be potentially missed as a result. Allowing for this:     Redemonstration of a STIR signal hyperintense enhancing lesion involving the midline arch of C2 extending to the right posterior C2 ring. There is associated postcontrast enhancement. In the setting of cancer this may represent a metastatic bony lesion.     Mild multilevel degenerative changes in the cervical spine is not appreciably different as compared to August 2022. There is no high-grade spinal canal narrowing identified. Evaluation of multilevel foraminal narrowing is suboptimal due to motion but no  obvious high-grade foraminal narrowing is visualized allowing for this limitation.     IMPRESSION:     Motion degraded exam could obscure pathology. Allowing for this:.     1.  Redemonstration of a STIR signal hyperintense enhancing lesion involving the midline arch of C2 extending to the right posterior C2 ring. There is associated postcontrast enhancement. In the setting of cancer this may represent a metastatic bony  lesion. Notably this is not appreciably different as compared to 8/4/2022.  2.  No additional bony lesion is identified.  3.  No suspicious cervical cord signal abnormality or suspicious cervical cord enhancing lesion.     Signer Name: KELLIE MAGDALENO MD   Signed: 11/21/2022  3:44 PM   Workstation Name: [id number]    [facility]    9/24/22 Patient: TANIYA CARPENTER  Time Out: 18:46  Exam(s): MRI PELVIS W WO Contrast      EXAM:    MR Pelvis Without and With Intravenous Contrast     CLINICAL HISTORY:     Reason for exam: Metastatic lung cancer, progressive low back pelvic   pain.     TECHNIQUE:    Multiplanar magnetic resonance images of the pelvis without and with   intravenous contrast.     COMPARISON:    No relevant prior studies available.     FINDINGS:    Bowel:  Unremarkable.  No obstruction.  No mucosal thickening.    Bladder:  Unremarkable.  No stones.  No mass.    Reproductive:  Unremarkable as visualized.    Bones joints: Bilateral sacral fractures are present, with marrow   infiltration, and findings are likely related to osseous metastasis, more   prominent on the right, contributing to a pathologic fracture.  The   probable osseous metastasis on the right measures on the order of 3.5 x 4.  1 x 4.3 cm in size.  No substantial displacement.  Evaluation is   technically limited secondary to patient motion.  This does obscure some   of the anatomic detail.     Also identified is a probable osseous metastasis along the posterior   aspect of the acetabulum, on the right, and this measures on the order of   1.5 cm in diameter.       Soft tissues:  Unremarkable.    Vasculature:  Unremarkable.  No lower abdominal aortic aneurysm.    Lymph nodes:  Unremarkable.  No enlarged lymph nodes.     IMPRESSION:          Osseous metastasis present within the sacrum, worst within the right   sacral ala, and there are probable pathologic fractures bilaterally.    Also identified is a posterior column right acetabular osseous metastasis.     IMPRESSION:        Electronically signed by Ariel Cotto D.O. on 09-24-22 at [id number]    8/4/22 MRI EXAMINATION OF THE BRAIN, CERVICAL SPINE, THORACIC SPINE AND LUMBAR  SPINE WITH AND WITHOUT CONTRAST     HISTORY:  Ataxia, confusion, lung and  breast cancer.     COMPARISON: CT head 08/03/2022, MRI lumbar spine 06/06/2022, MRI brain  10/26/2021, MRI thoracic spine 05/22/2021 and CT examination of the  cervical spine 02/11/2020.     MRI EXAMINATION OF BRAIN WITH AND WITHOUT CONTRAST:     TECHNIQUE: A MRI examination of the brain was performed before and after  the intravenous administration of contrast utilizing sagittal T1, axial  diffusion, T1, T2, T2 FLAIR, gradient echo T2 as well as sagittal, axial  and coronal T1 postcontrast weighted sequences.     FINDINGS: There is no evidence of restricted diffusion to suggest acute  infarction. There is expected flow-void in the basilar artery and in the  distal aspect of the internal carotid arteries bilaterally on the axial  T2 sequence. Small scattered foci of T2 FLAIR hyperintensity are  appreciated involving the white matter of the cerebral hemispheres  bilaterally consistent with mild small vessel ischemic disease and  lacunar infarcts. On the MRI examination 10/26/2021 there was a subacute  infarct involving the corona radiata on the right measuring 8 mm in  size. On the current examination, a small area of encephalomalacia is  appreciated measuring 5 mm in size at the same location. There was no  evidence of abnormal enhancement to suggest intracranial metastatic  disease.     IMPRESSION:  The study is hampered somewhat by patient motion but there  was no evidence of enhancement to suggest calvarial or intra-axial  metastatic disease. Mild small vessel ischemic disease is noted. There  is no evidence of acute infarction.        MRI EXAMINATION OF THE CERVICAL SPINE WITH AND WITHOUT CONTRAST:     The study is hampered by patient motion.     The alignment of the cervical spine is within normal limits. Signal  intensity within the cord is normal on the sagittal T2 sequence.     The sagittal T2 sequence demonstrates increased signal intensity  involving the lamina of C2 on the right, extending to and  involving the  spinous process as well as crossing midline to the left involving the  posterior aspect of the C2 lamina on the left. The fat-saturated  sequence demonstrates increased signal intensity involving the soft  tissues adjacent to the lamina and spinous process of C2. The area of T2  hyperintensity within the soft tissues measures approximately 1.5 cm in  cranial caudal dimension and 1.6 cm in AP dimension. After contrast  administration.  There was enhancement of the lamina of C2 bilaterally,  more prominent on the right as well as involving the spinous process.  There is dural enhancement extending from the level of the inferior  aspect of C2 to the inferior aspect of C1 posteriorly. The axial T1  noncontrast sequence demonstrates linear areas of signal loss involving  the posterior laminar bilaterally. Pathologic fractures cannot be  excluded.     C2-3: There is no evidence of disc bulge or herniation.     C3-4: There is no evidence of disc bulge or herniation.     C4-5: A minimal central disc bulge is present.     C5-6: Mild facet degenerative disease is present bilaterally.     C6-7: There is no evidence of disc bulge or herniation.     C7-T1: There is no evidence of disc bulge or herniation.     IMPRESSION:  1.  Edema and enhancement involving the lamina of C2 to the right  extending to and involving the spinous process and posterior aspect of  the lamina of C2 is appreciated consistent with metastatic disease.  There is also adjacent edema and enhancement within the soft tissues  adjacent to the posterior elements of C2. There is mild dural  enhancement posteriorly from the inferior aspect of the posterior arch  of C1 to the inferior aspect of C2. The appearance is nonspecific. The  degree of T2 hyperintensity and enhancement is more than typically seen  with a metastatic lesion. The axial T1 precontrast sequence demonstrates  linear areas of signal loss involving the lamina bilaterally.  Fractures  cannot be excluded. A CT examination of the cervical spine without  contrast is suggested.  2.  Mild degenerative disease involving the cervical spine as described  with no evidence of disc herniation.  3.  No evidence of cord signal abnormality or cord compression.        MRI EXAMINATION OF THE THORACIC SPINE WITH AND WITHOUT CONTRAST:     The alignment of the thoracic spine is within normal limits. There is a  compression deformity involving the T7 vertebral body with loss of  approximately 50% of vertical height anteriorly. Loss of vertical height  appears similar to the PET examination of 07/18/2022, the PET  examination of 04/11/2022, but is new versus the PET examination of  01/18/2022. There was mild edema and enhancement involving the superior  endplate of T7 anteriorly.     A 5 mm area of enhancement is appreciated involving the superior aspect  of the spinous process of T12 consistent with a metastatic lesion.     There is no evidence of disc herniation, canal stenosis or cord  compression. No other areas of enhancement to suggest metastatic disease  are identified.     IMPRESSION:  1.  There is a compression deformity involving T7 with mild edema and  enhancement suggesting an acute to subacute fracture involving the  superior endplate anteriorly. This is new as compared to the PET  examination of 01/18/2022 but loss of vertical height appears similar to  the PET examination of 04/11/2022. The appearance is nonspecific. This  may represent a benign osteoporotic compression fracture. Underlying  metastatic disease cannot be entirely excluded.  2.  There is a 5 mm metastatic lesion involving the superior aspect of  the spinous process of T12.  3.  No evidence of intradural enhancement, cord compression or cord  signal abnormality. There is no evidence of disc herniation.        MRI EXAMINATION OF THE LUMBAR SPINE WITH AND WITHOUT CONTRAST:     The alignment of the lumbar spine is within normal  limits. There is  increased signal intensity involving the marrow of the lumbar spine  which suggests radiation related changes. Signal loss throughout the L1  vertebral body is noted on the T1 sequence consistent with metastatic  disease. There is a mild concave deformity involving the inferior  endplate of L1 and to a lesser extent superior endplate of L1. A  fracture plane is noted paralleling the inferior endplate. Edema tracks  into the pedicles bilaterally. Signal loss and enhancement is  appreciated involving the superior articulating facet of L2 to the left  consistent with metastatic disease. A 5 mm metastatic lesion involving  the spinous process of T12 superiorly is again noted. A large metastatic  lesion is appreciated involving the sacral ala on the right. This  measures approximately 5 cm in transverse dimension.     The conus is at T12-L1 and the caudal aspect of the spinal cord appears  unremarkable.     T12-L1: There is no evidence of disc bulge or herniation.     L1-L2: A mild broad-based disc osteophyte complex is present which is  more prominent to the right.     L2-L3: There is no evidence of disc bulge or herniation.     L3-L4: A mild broad-based disc bulge is present with no evidence of  herniation.     L4-L5: Mild facet degenerative disease is present bilaterally. A mild  central disc osteophyte complex is present with no evidence of  herniation.     L5-S1: Moderate-to-severe facet degenerative disease is present  bilaterally. Moderate foraminal stenosis is present on the right and  there is moderate-to-severe foraminal stenosis on the left secondary to  loss of disc height and extension of a disc osteophyte complex into the  neural foramen.     IMPRESSION:  1.  Multifocal metastatic disease involving the lumbar spine and sacrum  is noted as described above with the largest lesion involving the sacral  ala to the right measuring approximately 5 cm in transverse dimension.  Metastatic  involvement of L1 is appreciated throughout the vertebral  body and extending to the pedicles bilaterally. There is no evidence of  epidural extension. Smaller metastatic foci are appreciated involving  the superior articulating facet of L1 to the left and the superior  aspect of the spinous process of T12.  2.  Loss of vertical height at L2 is appreciated estimated to be  approximately 25% in severity. There is edema paralleling the superior  endplate. The mild compression deformity at L2 is new versus the MRI  examination of 10/01/2021.     The above information was called to and discussed with Dr. Jade on  08/04/2022 at 1240 hours.     This report was finalized on 8/5/2022 9:05 AM by Dr. Brenton Parham M.D.    11/18/22 Creatinine 1.09, Platelets 147 (10*3)    Review of Systems   Constitutional: Negative for fever.   HENT: Negative for congestion.    Eyes: Negative for redness.   Respiratory: Negative for cough.    Cardiovascular: Negative for leg swelling.   Gastrointestinal: Positive for nausea. Negative for vomiting.   Genitourinary: Negative for difficulty urinating.   Musculoskeletal: Positive for arthralgias and back pain.   Neurological: Positive for weakness (bilateral legs).   Psychiatric/Behavioral: Negative for sleep disturbance.     I have reviewed and confirmed the accuracy of the ROS as documented by the MA/LPN/RN Breann Santiago MD      Vitals:    11/30/22 1008   BP: 128/89   Pulse: 115   Temp: 97.7 °F (36.5 °C)   SpO2: 95%   Height: 160 cm (62.99\")         Objective   Physical Exam  Vitals reviewed.   Constitutional:       Comments: Awake and responsive to verbal ques   Pulmonary:      Effort: Pulmonary effort is normal. No respiratory distress.   Musculoskeletal:      Comments: Negative pain with bilateral straight leg test.  Severe pain with sitting and standing.         Assessment & Plan   Diagnoses and all orders for this visit:    1. Lumbar radiculopathy (Primary)  -     Case Request    2.  Lumbar foraminal stenosis  -     Case Request        - Pertinent imaging reviewed.   - Pertinent labs reviewed.   - Will schedule for bilateral L5-S1 Transforaminal epidural steroid injection.  Risks discussed including but not limited to bleeding, bruising, infection, damage to surrounding structures, headache, and rare things such as being paralyzed, seizure, stroke, heart attack and death.  The risk of steroid medications include but are not limited to immunosuppression, which can increase the risk of erlinda an infectious disease as well as decrease the immune response to a vaccine.  Explained that her MRI does show foraminal stenosis at the L5-S1 level, which could explain a component of her low back and lower extremity pain.  Her , who makes medical decisions for her, would like to proceed with this injection and attempt to better control her pain.  - Patient screened positive for depression based on a PHQ-9 score of 13 on 8/19/2022. Follow-up recommendations include: PCP managing depression.  -Her pain medication is prescribed by her oncologist.    --- Follow-up for bilateral L5-S1 Transforaminal epidural steroid injection and office visit 3-4 weeks.            While examining this patient, I wore protective equipment including a mask and gloves.  I washed my hands before and after this patient encounter.  The patient wore a mask throughout the visit as well.     Breann Santiago MD  Pain Management    EMR Dragon/Transcription disclaimer:   Much of this encounter note is an electronic transcription/translation of spoken language to printed text. The electronic translation of spoken language may permit erroneous, or at times, nonsensical words or phrases to be inadvertently transcribed; Although I have reviewed the note for such errors, some may still exist.

## 2022-12-02 ENCOUNTER — APPOINTMENT (OUTPATIENT)
Dept: LAB | Facility: HOSPITAL | Age: 57
End: 2022-12-02
Payer: COMMERCIAL

## 2022-12-02 ENCOUNTER — OFFICE VISIT (OUTPATIENT)
Dept: FAMILY MEDICINE CLINIC | Facility: CLINIC | Age: 57
End: 2022-12-02

## 2022-12-02 ENCOUNTER — LAB (OUTPATIENT)
Dept: LAB | Facility: HOSPITAL | Age: 57
End: 2022-12-02
Payer: COMMERCIAL

## 2022-12-02 ENCOUNTER — PATIENT ROUNDING (BHMG ONLY) (OUTPATIENT)
Dept: NEUROLOGY | Facility: CLINIC | Age: 57
End: 2022-12-02

## 2022-12-02 ENCOUNTER — OFFICE VISIT (OUTPATIENT)
Dept: ONCOLOGY | Facility: CLINIC | Age: 57
End: 2022-12-02

## 2022-12-02 ENCOUNTER — INFUSION (OUTPATIENT)
Dept: ONCOLOGY | Facility: HOSPITAL | Age: 57
End: 2022-12-02
Payer: COMMERCIAL

## 2022-12-02 VITALS
SYSTOLIC BLOOD PRESSURE: 135 MMHG | TEMPERATURE: 97.1 F | HEART RATE: 128 BPM | BODY MASS INDEX: 24.98 KG/M2 | DIASTOLIC BLOOD PRESSURE: 84 MMHG | HEIGHT: 63 IN | OXYGEN SATURATION: 96 % | RESPIRATION RATE: 18 BRPM

## 2022-12-02 VITALS
BODY MASS INDEX: 25.87 KG/M2 | HEIGHT: 63 IN | SYSTOLIC BLOOD PRESSURE: 130 MMHG | DIASTOLIC BLOOD PRESSURE: 80 MMHG | WEIGHT: 146 LBS | OXYGEN SATURATION: 95 % | TEMPERATURE: 97.7 F | HEART RATE: 120 BPM

## 2022-12-02 DIAGNOSIS — C34.90 NON-SMALL CELL LUNG CANCER METASTATIC TO BONE: ICD-10-CM

## 2022-12-02 DIAGNOSIS — N18.31 STAGE 3A CHRONIC KIDNEY DISEASE: ICD-10-CM

## 2022-12-02 DIAGNOSIS — C79.51 NON-SMALL CELL LUNG CANCER METASTATIC TO BONE: ICD-10-CM

## 2022-12-02 DIAGNOSIS — Z17.0 MALIGNANT NEOPLASM OF UPPER-INNER QUADRANT OF LEFT BREAST IN FEMALE, ESTROGEN RECEPTOR POSITIVE: ICD-10-CM

## 2022-12-02 DIAGNOSIS — C50.212 MALIGNANT NEOPLASM OF UPPER-INNER QUADRANT OF LEFT BREAST IN FEMALE, ESTROGEN RECEPTOR POSITIVE: ICD-10-CM

## 2022-12-02 DIAGNOSIS — R40.4 ALTERED SENSORIUM: ICD-10-CM

## 2022-12-02 DIAGNOSIS — Z09 HOSPITAL DISCHARGE FOLLOW-UP: Primary | ICD-10-CM

## 2022-12-02 DIAGNOSIS — C34.90 NON-SMALL CELL LUNG CANCER METASTATIC TO BONE: Primary | ICD-10-CM

## 2022-12-02 DIAGNOSIS — G89.3 CANCER RELATED PAIN: ICD-10-CM

## 2022-12-02 DIAGNOSIS — E78.2 MIXED HYPERLIPIDEMIA: ICD-10-CM

## 2022-12-02 DIAGNOSIS — F41.9 ANXIETY: ICD-10-CM

## 2022-12-02 DIAGNOSIS — C79.51 NON-SMALL CELL LUNG CANCER METASTATIC TO BONE: Primary | ICD-10-CM

## 2022-12-02 LAB
ALBUMIN SERPL-MCNC: 2.7 G/DL (ref 3.5–5.2)
ALBUMIN/GLOB SERPL: 0.8 G/DL (ref 1.1–2.4)
ALP SERPL-CCNC: 196 U/L (ref 38–116)
ALT SERPL W P-5'-P-CCNC: 15 U/L (ref 0–33)
ANION GAP SERPL CALCULATED.3IONS-SCNC: 16.2 MMOL/L (ref 5–15)
AST SERPL-CCNC: 28 U/L (ref 0–32)
BASOPHILS # BLD AUTO: 0.03 10*3/MM3 (ref 0–0.2)
BASOPHILS NFR BLD AUTO: 0.3 % (ref 0–1.5)
BILIRUB SERPL-MCNC: 0.3 MG/DL (ref 0.2–1.2)
BUN SERPL-MCNC: 17 MG/DL (ref 6–20)
BUN/CREAT SERPL: 13.8 (ref 7.3–30)
CALCIUM SPEC-SCNC: 8.7 MG/DL (ref 8.5–10.2)
CHLORIDE SERPL-SCNC: 103 MMOL/L (ref 98–107)
CO2 SERPL-SCNC: 20.8 MMOL/L (ref 22–29)
CREAT SERPL-MCNC: 1.23 MG/DL (ref 0.6–1.1)
DEPRECATED RDW RBC AUTO: 50.2 FL (ref 37–54)
EGFRCR SERPLBLD CKD-EPI 2021: 50.7 ML/MIN/1.73
EOSINOPHIL # BLD AUTO: 0.02 10*3/MM3 (ref 0–0.4)
EOSINOPHIL NFR BLD AUTO: 0.2 % (ref 0.3–6.2)
ERYTHROCYTE [DISTWIDTH] IN BLOOD BY AUTOMATED COUNT: 14.6 % (ref 12.3–15.4)
GLOBULIN UR ELPH-MCNC: 3.4 GM/DL (ref 1.8–3.5)
GLUCOSE SERPL-MCNC: 117 MG/DL (ref 74–124)
HCT VFR BLD AUTO: 33.5 % (ref 34–46.6)
HGB BLD-MCNC: 10.2 G/DL (ref 12–15.9)
IMM GRANULOCYTES # BLD AUTO: 0.06 10*3/MM3 (ref 0–0.05)
IMM GRANULOCYTES NFR BLD AUTO: 0.6 % (ref 0–0.5)
LYMPHOCYTES # BLD AUTO: 0.43 10*3/MM3 (ref 0.7–3.1)
LYMPHOCYTES NFR BLD AUTO: 4.2 % (ref 19.6–45.3)
MCH RBC QN AUTO: 29.1 PG (ref 26.6–33)
MCHC RBC AUTO-ENTMCNC: 30.4 G/DL (ref 31.5–35.7)
MCV RBC AUTO: 95.4 FL (ref 79–97)
MONOCYTES # BLD AUTO: 1.31 10*3/MM3 (ref 0.1–0.9)
MONOCYTES NFR BLD AUTO: 12.8 % (ref 5–12)
NEUTROPHILS NFR BLD AUTO: 8.36 10*3/MM3 (ref 1.7–7)
NEUTROPHILS NFR BLD AUTO: 81.9 % (ref 42.7–76)
NRBC BLD AUTO-RTO: 0 /100 WBC (ref 0–0.2)
PLATELET # BLD AUTO: 225 10*3/MM3 (ref 140–450)
PMV BLD AUTO: 9.5 FL (ref 6–12)
POTASSIUM SERPL-SCNC: 3.8 MMOL/L (ref 3.5–4.7)
PROT SERPL-MCNC: 6.1 G/DL (ref 6.3–8)
RBC # BLD AUTO: 3.51 10*6/MM3 (ref 3.77–5.28)
SODIUM SERPL-SCNC: 140 MMOL/L (ref 134–145)
WBC NRBC COR # BLD: 10.21 10*3/MM3 (ref 3.4–10.8)

## 2022-12-02 PROCEDURE — 99215 OFFICE O/P EST HI 40 MIN: CPT | Performed by: FAMILY MEDICINE

## 2022-12-02 PROCEDURE — 85025 COMPLETE CBC W/AUTO DIFF WBC: CPT

## 2022-12-02 PROCEDURE — 96360 HYDRATION IV INFUSION INIT: CPT

## 2022-12-02 PROCEDURE — 36415 COLL VENOUS BLD VENIPUNCTURE: CPT

## 2022-12-02 PROCEDURE — 99215 OFFICE O/P EST HI 40 MIN: CPT | Performed by: INTERNAL MEDICINE

## 2022-12-02 PROCEDURE — G2212 PROLONG OUTPT/OFFICE VIS: HCPCS | Performed by: FAMILY MEDICINE

## 2022-12-02 PROCEDURE — 80053 COMPREHEN METABOLIC PANEL: CPT

## 2022-12-02 RX ORDER — DULOXETIN HYDROCHLORIDE 60 MG/1
60 CAPSULE, DELAYED RELEASE ORAL NIGHTLY
Qty: 30 CAPSULE | Refills: 5 | Status: SHIPPED | OUTPATIENT
Start: 2022-12-02 | End: 2022-12-27 | Stop reason: SDUPTHER

## 2022-12-02 RX ORDER — ALPRAZOLAM 0.5 MG/1
TABLET ORAL
Qty: 120 TABLET | Refills: 2 | Status: SHIPPED | OUTPATIENT
Start: 2022-12-02 | End: 2023-01-11 | Stop reason: SDUPTHER

## 2022-12-02 RX ADMIN — SODIUM CHLORIDE 1000 ML: 9 INJECTION, SOLUTION INTRAVENOUS at 13:10

## 2022-12-02 NOTE — PROGRESS NOTES
[facility] GROUP INPATIENT PROGRESS NOTE    Length of Stay:  0 days    CHIEF COMPLAINT/REASON FOR VISIT:  Metastatic non-small cell lung cancer  Bilateral breast cancer    Interval history  Ms. Patel presents to the clinic today for a follow-up.  She is brought to the clinic by her .  She is unable to ambulate and she is laying in bed mumbling and disoriented.  She is unable to hold a conversation with me today.  She appears to have lost weight.  She had an MRI of the brain with contrast as well as the cervical spine which does not show any significant abnormalities.  She was unable to go through the MRI of the thoracic and lumbar spine as she had trouble laying still and had incontinence.    Oncologic history  Ms. 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     3/1/2021-bilateral screening mammogram-microcalcifications seen in the posterior one third of the lateral aspect of the right breast.  Area of focal asymmetry seen in the middle one third of the upper inner quadrant of the left breast.     3/1/2021-DEXA scan-T score of -2.2 on the lumbar spine and T score of -2.3 in the left hip and T score of -1.9 in the right hip.  Findings consistent with osteopenia     3/23/2021-screening lung CT-there is a 10 x 11 mm solid nodule in the left upper lobe.  Enlarged AP window lymph node measuring 14 x 10 mm.  Suggestion of a possible 9 mm left hilar lymph node.  Heavy coronary artery calcification.     3/23/2021-diagnostic mammogram bilateral-cluster of microcalcifications in the middle third lateral aspect of the right breast.  Left breast demonstrates persistence of the area of focal asymmetry in the region.     Ultrasound-left breast ultrasound at 10 o'clock position, 6 cm from the nipple there is a 0.4 cm irregular hypoechoic lesion.  Stereotactic biopsy of the right breast calcifications recommended.   Ultrasound-guided biopsy of the left breast lesion recommended     4/7/2021-right breast stereotactic biopsy and left breast ultrasound-guided biopsy  Pathology  Right breast-invasive ductal carcinoma, grade 2, lymphovascular invasion present, ER +99% strong, WA +80% moderate, HER-2 negative, Ki-67 12%  Left breast upper inner quadrant 10 o'clock position biopsy, invasive ductal carcinoma, grade   2, ER +99% strong, WA +40% strong, HER-2 2+ on immunohistochemistry, nonamplified on FISH Ki-67 10%     4/14/2021-PET/CT-FDG avid aortopulmonary window lymph node measures 0.9 cm with an SUV of 7.5.  FDG avid left hilar lymph node measures 1 cm with an SUV of 17.2.  Irregular soft tissue density in the right breast measuring 3.7 x 3.8 cm is favored to be secondary to the recent breast biopsy.  1.1 cm pulmonary nodule within the left upper lobe with SUV 9.7 .  Sub-6 mm pulmonary nodules are present in the right lower lobe.  No evidence of lymphadenopathy or metastatic disease in the abdomen.  Focal area of FDG uptake within the central canal posterior to T11-T12 displaced demonstrating an SUV of 5.5 thought to be reactive however MRI is recommended for further evaluation.     She was seen by Dr. Molina who initially planned for breast surgery however  due to the PET abnormalities she has been referred to Dr. Kerr who plans to do a wound on 4/30/2021.  Dr. Molina's and Dr. Kerr's notes reviewed.     Patient denies any family history of breast cancer.  Her mother had lymphoma.  Maternal grandmother had colon cancer.  Prior to that screening mammogram she did not have any palpable abnormalities of either breast.     She has been a heavy smoker for about 40 years and smoked 2 packs/day.  Denies any recent weight changes, new bone pains, cough, abdominal pain nausea vomiting constipation or diarrhea.  She does have anxiety and has been on several medications.  She has recently been started on Xanax to help with the mood  and also with insomnia.     Patient had a video-assisted thoracoscopy and mediastinal lymphadenectomy on 4/30/2021.  L5-L6 lymph nodes were biopsied however the small pulmonary nodule in the left upper lobe was not difficult to find.  Pathology showed moderately differentiated adenocarcinoma which is CK7 and TTF-1 positive.  Consistent with lung primary.  Ki-67 85%.     5/14/2021-MRI of the brain-minimal chronic small vessel ischemic change in the white matter.  Otherwise negative MRI.     5/20/2021-bilateral axillary ultrasound-no evidence of axillary lymphadenopathy in either axilla.  Normal-appearing bilateral axillary lymph nodes visualized.     Cycle 1 cisplatin and Alimta on 5/25/2021.     Cycle 2 cisplatin Alimta 6/15/2021     Cycle 3 cisplatin Alimta 7/7/2021     Completed radiation on 7/12/2021     Port was nonfunctional hence a port study was performed which showed that the port was backed up into the innominate vein and also there was a nonocclusive thrombus at the end of the catheter extending into the superior vena cava.  She was started on anticoagulation with Eliquis.  It was recommended for port revision of the intention to keep the port.     PET/CT 8/5/2021-images independently reviewed and interpreted by me-decrease in size and FDG uptake of the left upper lobe pulmonary nodule as well as mediastinal and left hilar lymphadenopathy representing response to treatment.  Sub-6 mm pulmonary nodule in the left upper lobe new since 4/14/2021.  This could be related to radiation however follow-up CT in 3 months recommended.  Decrease in size of the irregular masslike tissue in the right breast which is postbiopsy hematoma.  This is not PET avid.  New segmental left eighth rib fractures healing.  A new band of sclerosis of the left seventh rib which favored to represent healing nondisplaced fracture.  Follow-up CT recommended.  focal uptake in the duodenum first and second portions.  Could be related to  duodenitis.  Indeterminate lesion in the L1 vertebral body not well evaluated on PET/CT.     10/1/2021-MRI of the lumbar spine.  Lesion in the left posterior body of L1 measures 14 x 14 x 12 mm and previously 5 x 5 x 6 mm.  The interval enlargement strongly suggest that it is metastatic lesion.  No additional osseous metastasis noted.   Other chronic changes noted.     10/14/2021-biopsy of the lumbar spine lesion-pathology consistent with poorly differentiated carcinoma.  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     10/26/2021-brain MRI-no evidence of metastatic disease.     10/26/2021-bilateral diagnostic mammogram and ultrasound  Scattered fibroglandular density in both breast.  Postbiopsy hematoma with internal biopsy clip in the upper outer quadrant of the right breast, 8 cm from the nipple.  The hematoma size is decreased to 2.9 cm from 3.6 cm earlier.     Left breast-parenchymal density measuring 9 x 10 mm has markedly decreased.  Few adjacent calcifications which are unchanged.  No new abnormality in the left breast.  At 10:00 region there is some minimal adjacent hypoechoic texture which is difficult to measure but appears smaller since the previous exam.     10/28/2021-PET/CT  New L1 and L2 lytic bone metastasis annually intensely hypermetabolic focus at the left adrenal gland likely represents metastatic disease as well.  Slight decrease in size of the 0.9 x 0.7 cm left upper lobe pulmonary nodule.  There is hypermetabolic activity related to radiation pneumonitis.  The remainder of the nodule is photopenic.  Uncertain etiology of the more intense activity along the right lateral peripheral margin of the 2.6 cm postbiopsy density of the right breast.     She completed radiation to to the lumbar spine on 11/19/2021 11/22/2021-cycle 1 Alimta and Keytruda     3/23/2022-bilateral diagnostic mammogram and  ultrasound  Complete resolution of the microcalcifications in the upper outer quadrant of the right breast and decrease in size of the postbiopsy hematoma.  No suspicious abnormalities in the right breast.  Benign-appearing calcifications at the site of malignancy in the left breast upper inner quadrant.  No other suspicious findings.     4/11/2022 PET/CT-there is new groundglass opacities in the left upper lobe which are most likely postradiation changes.  Other groundglass opacities in the left lung as well as the right lung remained stable.  Increased nodularity of the left adrenal gland with an SUV of 5.6, previously 3.5.    Increased uptake in the L1 vertebra in the right posterior aspect with an SUV of 3.6.  Left L1 sclerosis increased     Due to this the case was discussed in multidisciplinary conference.  The disease progression was significant in the left adrenal gland as well as the L1 vertebra.  Since there were only 2 areas of disease progression it has been decided to proceed with radiation to these 2 spots and continue on Keytruda and Alimta.     Patient also has had worsening of her kidney function.  We initially held treatment as of 5/31/2022 and creatinine bumped up to 2.0, BUN 23.  We then resumed therapy 6/11/2022 with Keytruda alone.  Patient was referred to nephrology.     6/6/2022-MRI of the spine-diffuse marrow replacement in the L1 vertebral body and inferior endplate concavity.  Suspicious for metastatic disease with pathological fracture in the inferior endplate.  Also diffuse signal abnormality in the L2 lamina on the left suspicious for additional metastatic disease.  Abnormality of the first sacral segment suspicious for metastatic disease.  Left adrenal gland appears enlarged.  30 to 40% height loss and L2 vertebral body suggestive of a subacute compression fracture.  Degenerative changes.     7/18/2022-PET/CT  Multiple hypermetabolic osseous lesions with index lesions which have either  increased in degree of FDG uptake or newly hypermetabolic lesions representative of metastatic disease and progression of disease.  Possible FDG avid lesion in the left femoral diaphysis however these are incompletely visualized and in the area of artifactual FDG uptake.  MRI of the left femur recommended for further evaluation.  Increasing FDG uptake of the left adrenal gland.  Focal left hilar hypermetabolic him corresponds to the lymph node which has minimally increased in size compared to April 2022.  New sub-6 mm pulmonary nodules.     7/26/2022-cycle 1 of Taxotere     Patient was admitted to the hospital 8/3/2021 discharged on 8/9/2022.  She was admitted for strokelike symptoms and confusion.  The confusion was thought to be secondary to polypharmacy and probably febrile neutropenia.  She was treated with antibiotics.  Mental status improved subsequently.  MRI of the cervical thoracic and lumbar spine was performed on 8/4/2022.  Images independently reviewed by me.  Multiple metastatic lesions in the spine noted.  There was a lesion on the CT which was concerning.  There is also a sacral Lesion measuring up to 5 cm in transverse dimension on the right.  Patient was symptomatic with pain on the right side of the sacrum.  Radiation oncology recommended radiation to the cervical spine as well as sacrum.  CT head without any obvious abnormalities.     Completed radiation to the cervical spine and sacrum on 8/12/2022    She was admitted to the hospital between 9/23/2022 to 10/12/2022.  She was admitted for poorly controlled pain as well as encephalopathy and multiple falls.  She was diagnosed with a UTI and treated with IV Rocephin.  Pain medications changed and also additional radiation to the right sacrum was performed.  3000 cGy in 10 fractions was administered.  She was discharged on oxycodone 15 mg every 4 hours as needed for pain and OxyContin twice daily.  She was recommended to go to a subacute rehab  however she preferred to be discharged home with home physical therapy and Occupational Therapy    9/24/2022-MRI of the pelvis showed osseous metastatic disease in the sacrum worst within the right and probable pathological fractures bilaterally.    10/4/2022 CT head without any evidence of metastatic disease or acute abnormalities.    10/25/2022-PET/CT-resolution of hypermetabolic activity in the upper lobe and left hilum.  Previously noted new tiny pulmonary nodules have resolved.  Resolution of hypermetabolic activity in the left adrenal gland.  Near complete resolution of hypermetabolic activity in the skeletal metastasis particularly the cervical spine, pelvic bones and proximal left femur.  Maximal SUV at the right sacral metastasis is 2.7 and previously it was 8.9.  Tiny focus of hypermetabolic activity at the GE junction with a maximal SUV of 4.7.  No definite thickening or abnormality noted on the CT.    11/15/2022-lumbar puncture with negative cytology of the CSF.    11/21/2022-MRI of the brain with contrast and MRI of the cervical spine with contrast without any evidence of metastatic disease.    PHYSICAL EXAMINATION:    General: Ill-appearing, not oriented to time place or person.  She is mumbling constantly and appears to be hallucinating.  HEENT: Periorbital edema has resolved.  Chest/Lungs: Clear to auscultation bilaterally. Right chest mediport in place  Heart: RRR  Abdomen/GI: soft, NT, ND, NABS  Extremities: Bilateral lower extremity without any swelling.  Decreased strength in both lower extremities.  No focal deficits.          DIAGNOSTIC DATA:  Results Review:     I reviewed the patient's new clinical results.        Lab Results   Component Value Date    NEUTROABS 5.26 11/18/2022                     IMAGING:      PET/CT 10/25/2022-images independently reviewed and interpreted by me in detail summarized above    ASSESSMENT:  This is a 57 y.o. female with:     *Metastatic non-small cell lung  cancer  · She was initially diagnosed with stage III (T1N2M0) disease.    · She received initial therapy with cisplatin and Alimta concurrent with radiation therapy beginning 5/25/2021.    · There were indeterminate findings in the lumbar spine at L1 and it was recommended to monitor this over time.    · Patient completed 3 cycles cisplatin and Alimta 7/7/2021 and completed radiation therapy 7/12/2021.    · PET scan 8/6/2021 showed good response to treatment.    · MRI lumbar spine 10/1/2021 with increase in size of the L1 lesion.    · Biopsy of the L1 lesion on 10/14/2022 confirmed metastatic adenocarcinoma of lung origin, PD-L1 TPS 0.    · PET scan 10/26/2021 with involvement of left adrenal and L1/L2 only.    · Patient received radiation therapy to L1/L2 on 11/19/2021.  She also received radiation to the left adrenal gland.    · She initiated further systemic therapy on 11/22/2021 with Keytruda and Alimta.   · Follow-up PET scan 1/18/2022 with decrease in small left upper lobe pulmonary nodule, resolution of activity at L1/L2, no new sites of disease.    · Guardant 360 CT DNA level was monitored and was decreasing.    · PET scan 4/11/2022 with progression in left adrenal and L1.    · Maintained systemic therapy with Keytruda and Alimta and received additional radiation to left adrenal and L1.  Alimta however held since 5/10/2022 due to renal dysfunction.  Radiation completed to lumbar spine 7/14/2022.    · PET scan 7/18/2022 with multiple areas of progression of the spine and right sacrum.    · Guardant 360 analysis 7/20/2022 with no actionable mutations.    · Change in therapy to single agent palliative Taxotere initiated 7/26/2022.    · Altered mental status and febrile neutropenia requiring hospitalization 8/2 - 8/9/2022.  During that admission, MRI brain, cervical, thoracic, lumbar spine performed 8/4/2022 in addition to CT cervical spine 8/5/2022.  There was evidence of C2 metastatic lesion with some dural  enhancement, compression deformity at T7 with mild edema suggesting acute to subacute fracture, 5 mm T12 spinous process metastasis, multifocal disease in the lumbar spine and sacrum, largest involving sacral ala to the right 5 cm in transverse dimension.  Loss of height at L2 25%.  · Patient received cycle 3 Taxotere 9/6/2022 with Neulasta support.    · She has been continuing as well on Xgeva every 4 weeks (last received 9/6/2022).  · Patient did undergo MRI lumbar spine and pelvis on 9/24/2022.  MRI pelvis showed bilateral sacral fractures with marrow infiltration related to metastasis more prominent on the right.  Lesion on the right 3.5 x 4.1 x 4.3 cm.  Also probable metastasis along posterior acetabulum on the right 1.5 cm.  MRI lumbar spine with stable L1 metastasis, new edema endplate T12 possibly stress reaction versus developing new metastasis, lesion at T12 spinous process unchanged.   · Radiation oncology consulted with plans for palliative treatment of the right sacral metastasis.  Treatment initiated 9/28/2022  · Completed radiation to the right sacral metastasis on 10/11/2022.  · PET/CT 10/25/2022 without any metabolic uptake in the lung or the skeletal metastasis.  This is indicative of a positive response to Taxotere.  · Pain persistent sitting and walking position and not present when she is laying down.  · Patient is still very active and performance status is very poor.  Due to this reason chemotherapy will be continued to be held.  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  · Due to this reason an MRI of the brain was obtained but unfortunately this was performed without contrast.  Reviewed the MRI and no evidence of metastatic disease.  · 11/15/2022 lumbar puncture shows no evidence of malignancy in the CSF, elevated protein at 65.2, glucose normal at 58, culture no growth in 3 days  · Repeat MRI of the brain with  contrast 11/21/2022 does not show any obvious abnormalities.  There is no enhancement of the CSF.  MRI of the cervical spine with no abnormalities.  · Patient unable to undergo MRI of the thoracic and lumbar spine due to mental status and unable to being in 1 position as well as with incontinence.     *History of bilateral stage I breast cancer  · Patient with bilateral breast cancer, both stage I ([id number]), grade 2, ER/DE positive and HER2/melinda negative with low Ki-67.    · Patient initiated adjuvant letrozole in May 2021.  · Continue letrozole    *Mental status changes  · Patient is totally disoriented and unable to hold conversation  · Actively hallucinating.  · The etiology of this is unclear.  · We have done a PET/CT, lumbar puncture and MRI of the brain and cervical spine which did not show any evidence of worsening of malignancy.  There is no evidence of leptomeningeal disease so far.  · The mental status changes are totally unexplained.  · She has been referred to neurology for further evaluation of the same and no clear explanation from their standpoint either.  Neurology note reviewed.     *Cancer related pain  · Patient has been receiving Duragesic patch 50 mcg every 72 hours in addition to oxycodone 15 mg every 4 hours for breakthrough pain.  Duragesic dose had been escalated recently in the outpatient setting due to worsening pain  · On admission 9/23/2022, Duragesic patch increased to 75 mcg daily, added Decadron 4 mg IV every 6 hours with continuation of oxycodone 15 mg every 4 hours as needed for breakthrough pain in addition of Dilaudid 1 mg every 2 hours as needed for breakthrough pain.  · Patient with increasing side effects from narcotics, Duragesic decreased on 9/25/2022 from 75 down to 50 mcg patch every 72 hours.  · Dexamethasone dose decreased to 4 mg p.o. every 12 hours on 9/27/2022  · Initiated palliative radiation to the right sacrum on 9/28/2022  · Patient became confused after receiving  Dilaudid.  Patient and family asked to discontinue Dilaudid.  · Duragesic patch dose was increased to 75 mcg/h on 9/30/2022. Subsequent decrease to 50 mcg due to confusion  · Gabapentin was discontinued due to the sedating effect.  · Oxycodone is being used for breakthrough pain.  · 10/3 transition to sustained release oxycodone 40 mg bid and fentanyl patch stopped  · Poorly controlled, pain particularly worse in sitting and standing position.  · Unsure if extends is contributing to mental status changes as previously patient had been very sensitive to pain meds.  · Due to this reason we will discontinue the Xtampza ER and switch to OxyContin 20 mg twice daily.  · She continues to experience severe mental status changes.  · Will discontinue OxyContin and continue only on as needed oxycodone to see if there would be any improvement in symptoms.     *Anxiety/depression  · Patient was previously on Wellbutrin  mg daily, Lexapro 10 mg daily.  · On 9/30/2022, she was switched from Lexapro to Cymbalta 30 mg daily.  · Atarax initiated for restlessness, and helping with symptom  · Patient is disoriented to time place and person and hallucinating.     *Insomnia  · Her  has been administering Xanax as needed.     *Anemia  · Most recent evaluation 8/3/2022 with iron 15, ferritin 276, iron saturation 5%, TIBC 328, folate greater than 20, B12 392  · Anemia felt to be related to malignancy/chronic disease and chemotherapy  · Hemoglobin normal today    *Thrombocytopenia  · New since the past 2 visits   · Platelet count normal today      *Peripheral neuropathy  · Patient was on gabapentin 300 mg twice daily.  · Gabapentin was discontinued on 9/30/2022.  · Continue Cymbalta  · Neuropathy stable     *Narcotic induced constipation  · Patient is on Sybil-Colace 2 tablets twice daily with MiraLAX daily   · Has been constipated, resume bowel regimen which was previously being held due to diarrhea     *GI  prophylaxis  · Protonix 40 mg daily        *Elevated liver labs  · Atorvastatin was discontinued  · LFTs normal    *BLE calf pain  · BLE venous duplex negative. Exam reassurring    *Fall with right scalp hematoma  · Improving    *Deconditioning  · Patient continues to be very sedentary and in bed most of the time.  · She is progressively getting weaker and I do not believe that she will be able to tolerate chemotherapy with her current functional status.  · Recommend physical therapy and Occupational Therapy..    RECOMMENDATIONS:  1. Completed radiation 10/11/2022C  2. Discontinue OxyContin and continue with as needed oxycodone  3. Continues on Cymbalta  4. 1 L normal saline today  5. Hold off on pain management referral  6. Reassess in 1 week      45 minutes have been spent on the encounter including face-to-face time, reviewing the imaging and interpreting them independently and documentation on the same day.      Salma Snell MD

## 2022-12-02 NOTE — PROGRESS NOTES
Chief Complaint  Anxiety (Chronic med refills/Hospital follow-up from last month, not doing well) and Altered Mental Status (Crying in pain does not make any sense )     HOSPITAL FOLLOW-UP  And  3-month follow-up for chronic SHAYY, lung cancer with bony mets, uncontrolled pain, and now persistent altered mental status changes    She is accompanied to the visit today by her /Jelani.  She is in a wheelchair, barely oriented to person only (knows who I am.)  Nonconversational and visibly uncomfortable in pain.  They are just returning from their oncology visit with Dr. Snell from earlier this morning, very detailed note has been reviewed in its entirety.  See below    MOST RECENT HOSPITALIZATION on 9/23/2022 through 10/12/2022, records reviewed  Dx--Poorly controlled cancer pain, Encephalopathy, and falls  She was admitted to the oncology service for deteriorating mental status changes, falls, and pain from bony metastasis.  Work-up in hospital included labs, CT head, and MRI of her pelvis.  No metastatic disease was found in the brain.  However mets to her sacrum/pelvis and bilateral pathologic fx seen.  She received XRT to her sacrum and her pain meds were manipulated.  She has a prior history of significant sensitivities and intolerance to pain medication.  The decision was made to discharge her on OxyContin 20 mg twice daily and oxycodone IR 15 mg every 4 hours.  She was also treated for UTI with IV Rocephin.    Other interval history --- seen by oncology team every week and/or multiple phone calls with further testing and imaging.  Multiple records reviewed.  10/25/2022 PET scan findings were consistent with a good response to chemo, resolution of many pulmonary and adrenal lesions, sacrum area appeared to be responding to treatment as well.  Thus, her chemo was continued.    Although she had some improvement with her mentation after hospitalization, nothing significant.  Therefore urgent referrals were set  up to both neurology and pain management by her oncologist.  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  On 11/15/2022 she underwent a lumbar puncture --no malignant cells were found in the CSF, therefore ruling out LPC?  On 11/21/2022 --- she had repeat MRI of brain and C-spine, no evidence of metastatic disease!    Neurology consultation on 11/21/2022 with Dr. Blaine Smith, records reviewed.  Neurologist suspects her altered mental status is due to the pain medications?  And, that the only thing that has been helping is her Xanax.    Oncology note from today's visit this morning with Dr. Kaur reviewed with  and patient today.  -- In regards to the mental status changes, no clear etiology has been found.  PET/CT, LP, MRI of brain and C-spine show no evidence of worsening disease or metastatic spread.  Thus far, there has been no evidence of leptomeningeal disease.  S/P urgent neurology consultation done and they have nothing to add.  --Cancer related pain, failed Duragesic, Decadron, Dilaudid, XRT to sacrum, fentanyl.  She has a strong history of sensitivity to pain medications which may be contributing to her mental status changes?  The plan today was to discontinue long-acting OxyContin and administer only her short acting oxycodone every 4 hours as needed.  She was also given 1 L of normal saline in the oncology office this morning.  Planning to reevaluate in 1 week.  Chemo on hold.    Today at office visit with me late Friday afternoon on 12/2/2022 she is still quite disoriented, unable to hold a conversation.  Although she does know who I am.  She is quite uncomfortable in pain, crying in pain.  /Jelani is becoming irritated while waiting for me to review all records.  Therefore, only reviewed partial records until after they left and then had a complete review of all records, see above.  Jelani/  "reports that she has really been going downhill times the last 3 weeks.  When leaving the hospital approximately 6 weeks ago she had some improvement with her mentation and pain but really nothing significant.  Extensive outpatient work-up has been done within the last several weeks, see above.  Jelani is very hesitant and resistant about taking her back to the hospital, stating \"she will just sit there and nothing will be done.\"    Jelani believes the only thing that has really helped Marcelle at this point is the Xanax.  This does seem to calm her down.  Received a telephone call earlier this week in regards to this.  Therefore, plans were made to increase her quantity from BID to TID but apparently it was still filled for only a quantity of 60?  Star report reviewed and verified.    Review of Systems   Constitutional: Negative for fever and unexpected weight change.   Respiratory: Negative for cough and shortness of breath.    Cardiovascular: Negative for chest pain.        Subjective          Holli Patel presents to [facility]    Objective   Vital Signs:   Vitals:    12/02/22 1453   BP: 130/80   BP Location: Left arm   Patient Position: Sitting   Cuff Size: Adult   Pulse: 120   Temp: 97.7 °F (36.5 °C)   SpO2: 95%   Weight: 66.2 kg (146 lb)   Height: 160 cm (63\")      Body mass index is 25.86 kg/m².   Physical Exam  Constitutional:       General: She is in acute distress.   HENT:      Head: Normocephalic and atraumatic.   Eyes:      Conjunctiva/sclera: Conjunctivae normal.   Cardiovascular:      Rate and Rhythm: Tachycardia present.      Pulses: Normal pulses.      Heart sounds: Normal heart sounds. No murmur heard.  Pulmonary:      Effort: Pulmonary effort is normal. No respiratory distress.      Breath sounds: Normal breath sounds. No wheezing or rales.   Abdominal:      General: Abdomen is flat. Bowel sounds are normal. There is no distension.      Palpations: Abdomen is soft. "      Tenderness: There is no abdominal tenderness.   Musculoskeletal:      Cervical back: Normal range of motion and neck supple. No rigidity.      Right lower leg: No edema.      Left lower leg: No edema.   Lymphadenopathy:      Cervical: No cervical adenopathy.   Skin:     General: Skin is warm.   Neurological:      Mental Status: She is disoriented.      Cranial Nerves: No cranial nerve deficit.   Psychiatric:         Mood and Affect: Mood is anxious.         Speech: She is noncommunicative.        Result Review :     Common labs    Common Labs 11/9/22 11/9/22 11/18/22 11/18/22 12/2/22 12/2/22    1202 1202 0903 0903 1145 1145   Glucose  125 (A)  155 (A)  117   BUN  12  12  17   Creatinine  1.04  1.09  1.23 (A)   Sodium  139  139  140   Potassium  3.4 (A)  3.4 (A)  3.8   Chloride  103  103  103   Calcium  8.6  8.8  8.7   Albumin  3.40 (A)  3.10 (A)  2.70 (A)   Total Bilirubin  0.3  0.2  0.3   Alkaline Phosphatase  120 (A)  151 (A)  196 (A)   AST (SGOT)  9  13  28   ALT (SGPT)  12  8  15   WBC 6.20  6.73  10.21    Hemoglobin 10.5 (A)  10.2 (A)  10.2 (A)    Hematocrit 34.3  33.0 (A)  33.5 (A)    Platelets 106 (A)  147  225    (A) Abnormal value            TSH    TSH 9/30/22 10/3/22 11/18/22   TSH 0.244 (A) 0.517 1.360   (A) Abnormal value            A1C Last 3 Results    HGBA1C Last 3 Results 8/4/22   Hemoglobin A1C 5.20               Lab Results   Component Value Date    FERRITIN 276.00 (H) 08/03/2022    KIXO25VX 6.5 (L) 08/06/2022    FOLATE >20.00 09/30/2022               Assessment and Plan    Diagnoses and all orders for this visit:    1. Hospital discharge follow-up (Primary) S/P poorly controlled cancer pain, encephalopathy, and falls  Hospital records reviewed  S/P XRT radiation to sacrum, manipulation of pain medication, multiple imaging studies    2. 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  Multiple imaging studies have shown good response  to treatment and no evidence of metastatic disease to brain, mets are limited to C-spine, L-spine, and sacrum.  Yet, continues with mental status changes and cancer related pain.    3. Cancer related pain  -remains uncontrolled  Failed multiple medications, see above HPI.  She has a history of being very sensitive to pain medications.  Plan at this time is to discontinue long-acting OxyContin (to see if this helps with mental status, as  really believes this may help) and continue her immediate release oxycodone every 4 hours.  Also, I suggested increasing Cymbalta from 30 to 60 mg daily and stopping Zocor altogether.  Recommend referral back to hospital for adequate pain control and or strongly considering hospice services as she does have a Living Will in place.  Patient well-known to me, I have been her PCP x20 years.  Discussed with  today.  He would like to see how this new plan works in regards to discontinuing long-acting pain medication and see if there is any improvement before coming up with a new plan.    4. Altered sensorium  -remains uncontrolled, especially over the last 3 to 4 weeks.  Extensive work-up done during hospitalization as well as most recently outpatient, see above HPI.  Etiology unknown.  Thus far no evidence of leptomeningeal disease, however oncologist plans to reevaluate in 1 week.  Possibly due to her intolerance to pain medications/sensitivity?  Lab work has remained stable.  Plan to recheck urine culture today, sent home with potty hat and specimen cup.   will bring back.  Plan is to discontinue long-acting OxyContin altogether and only use immediate release oxycodone.  We will also increase her Xanax to 4 times daily dosing, may consider XR Xanax?    5. Anxiety  -remains uncontrolled  See above plan.  Star report reviewed, appropriate.  Urine Tox screen up-to-date.  Plan to increase Xanax from BID to QID dosing, may consider XR Xanax  -     ALPRAZolam (XANAX)  "0.5 MG tablet; 1 p.o. every 6 hours, change in quantity, metastatic cancer  Dispense: 120 tablet; Refill: 2    6. Stage 3a chronic kidney disease (HCC)  -stable    7. Mixed hyperlipidemia  -stop Zocor, do not need any other weakness or myalgias at this time.    Other orders  -     DULoxetine (CYMBALTA) 60 MG capsule; Take 1 capsule by mouth Every Night for 21 doses.  Dispense: 30 capsule; Refill: 5    Addendum on Sunday, December 4, 2022 --- I called and talked to her  Jelani this afternoon x 25 minutes to check on Marcelle.  He reports since discontinuing long-acting OxyContin approximately 48 hours ago that she may be \"a little better\" like maybe \"10%\" in terms of her comfort level and mental status.  We discussed again in detail long-term options, such as hospice services.  At this point, he is \"not ready to give up hope\" and would like to see how this week goes, in particular checking her urine.  I have asked him to call me back and give me an update midweek.  Also, a message will be sent to her oncologist.          I spent 90 minutes caring for Holli on this date of service. This time includes time spent by me in the following activities:preparing for the visit, reviewing tests, obtaining and/or reviewing a separately obtained history, performing a medically appropriate examination and/or evaluation , counseling and educating the patient/family/caregiver, ordering medications, tests, or procedures, referring and communicating with other health care professionals , documenting information in the medical record, independently interpreting results and communicating that information with the patient/family/caregiver, care coordination and Hospital records reviewed, multiple imaging studies reviewed, multiple consultation notes from specialist reviewed and explained to patient's  in depth today.  High risk/long-term care/advanced directives and complex high risk medical decision making done today.  In " addition, multiple records reviewed at end of visit and further discussed with  with a follow-up telephone call by myself 48 hours later.  See addendum.  Follow Up   Return in about 3 months (around 3/2/2023) for Recheck.  Patient was given instructions and counseling regarding her condition or for health maintenance advice. Please see specific information pulled into the AVS if appropriate.

## 2022-12-02 NOTE — PATIENT INSTRUCTIONS
Stop Zocor  Increase Cymbalta to 60 mg daily  May increase Xanax to 4 times a day    Need to collect urine, specimen cup sent with  today    Strongly encourage  to take her back to emergency room/hospital for immediate pain control and work-up of mental status changes and metastatic cancer pain.  Or consider hospice services.

## 2022-12-05 DIAGNOSIS — N39.0 URINARY TRACT INFECTION WITHOUT HEMATURIA, SITE UNSPECIFIED: ICD-10-CM

## 2022-12-05 DIAGNOSIS — R40.4 ALTERED SENSORIUM: Primary | ICD-10-CM

## 2022-12-05 LAB
BILIRUB BLD-MCNC: NEGATIVE MG/DL
CLARITY, POC: ABNORMAL
COLOR UR: ABNORMAL
EXPIRATION DATE: ABNORMAL
GLUCOSE UR STRIP-MCNC: NEGATIVE MG/DL
KETONES UR QL: NEGATIVE
LEUKOCYTE EST, POC: ABNORMAL
Lab: ABNORMAL
NITRITE UR-MCNC: NEGATIVE MG/ML
PH UR: 6 [PH] (ref 5–8)
PROT UR STRIP-MCNC: ABNORMAL MG/DL
RBC # UR STRIP: NEGATIVE /UL
SP GR UR: 1.03 (ref 1–1.03)
UROBILINOGEN UR QL: NORMAL

## 2022-12-05 PROCEDURE — 81003 URINALYSIS AUTO W/O SCOPE: CPT | Performed by: FAMILY MEDICINE

## 2022-12-05 RX ORDER — SULFAMETHOXAZOLE AND TRIMETHOPRIM 800; 160 MG/1; MG/1
1 TABLET ORAL 2 TIMES DAILY
Qty: 14 TABLET | Refills: 0 | Status: SHIPPED | OUTPATIENT
Start: 2022-12-05 | End: 2022-12-27

## 2022-12-05 NOTE — TELEPHONE ENCOUNTER
dropped off urine specimen per Dr Gastelum order  notified we are sending in antibiotics to treat empirically to cover her

## 2022-12-06 ENCOUNTER — TRANSCRIBE ORDERS (OUTPATIENT)
Dept: SURGERY | Facility: SURGERY CENTER | Age: 57
End: 2022-12-06

## 2022-12-06 DIAGNOSIS — Z41.9 SURGERY, ELECTIVE: Primary | ICD-10-CM

## 2022-12-06 PROBLEM — M48.061 LUMBAR FORAMINAL STENOSIS: Status: ACTIVE | Noted: 2022-12-06

## 2022-12-06 PROBLEM — M54.16 LUMBAR RADICULOPATHY: Status: ACTIVE | Noted: 2022-12-06

## 2022-12-07 LAB
BACTERIA UR CULT: NORMAL
BACTERIA UR CULT: NORMAL

## 2022-12-09 ENCOUNTER — LAB (OUTPATIENT)
Dept: ONCOLOGY | Facility: HOSPITAL | Age: 57
End: 2022-12-09
Payer: COMMERCIAL

## 2022-12-09 ENCOUNTER — INFUSION (OUTPATIENT)
Dept: ONCOLOGY | Facility: HOSPITAL | Age: 57
End: 2022-12-09
Payer: COMMERCIAL

## 2022-12-09 ENCOUNTER — OFFICE VISIT (OUTPATIENT)
Dept: ONCOLOGY | Facility: CLINIC | Age: 57
End: 2022-12-09

## 2022-12-09 ENCOUNTER — APPOINTMENT (OUTPATIENT)
Dept: LAB | Facility: HOSPITAL | Age: 57
End: 2022-12-09
Payer: COMMERCIAL

## 2022-12-09 ENCOUNTER — APPOINTMENT (OUTPATIENT)
Dept: ONCOLOGY | Facility: HOSPITAL | Age: 57
End: 2022-12-09
Payer: COMMERCIAL

## 2022-12-09 VITALS
RESPIRATION RATE: 16 BRPM | SYSTOLIC BLOOD PRESSURE: 105 MMHG | WEIGHT: 126.4 LBS | DIASTOLIC BLOOD PRESSURE: 74 MMHG | OXYGEN SATURATION: 98 % | BODY MASS INDEX: 22.39 KG/M2 | TEMPERATURE: 98 F | HEART RATE: 109 BPM

## 2022-12-09 DIAGNOSIS — C50.212 MALIGNANT NEOPLASM OF UPPER-INNER QUADRANT OF LEFT BREAST IN FEMALE, ESTROGEN RECEPTOR POSITIVE: Primary | ICD-10-CM

## 2022-12-09 DIAGNOSIS — Z74.09 IMPAIRED MOBILITY AND ACTIVITIES OF DAILY LIVING: ICD-10-CM

## 2022-12-09 DIAGNOSIS — Z45.2 ENCOUNTER FOR FITTING AND ADJUSTMENT OF VASCULAR CATHETER: Primary | ICD-10-CM

## 2022-12-09 DIAGNOSIS — E87.6 HYPOKALEMIA: ICD-10-CM

## 2022-12-09 DIAGNOSIS — R53.1 WEAKNESS: ICD-10-CM

## 2022-12-09 DIAGNOSIS — R29.818 NEUROLOGICAL DEFICIT PRESENT: ICD-10-CM

## 2022-12-09 DIAGNOSIS — C34.90 NON-SMALL CELL LUNG CANCER METASTATIC TO BONE: ICD-10-CM

## 2022-12-09 DIAGNOSIS — Z78.9 IMPAIRED MOBILITY AND ACTIVITIES OF DAILY LIVING: ICD-10-CM

## 2022-12-09 DIAGNOSIS — C79.51 NON-SMALL CELL LUNG CANCER METASTATIC TO BONE: ICD-10-CM

## 2022-12-09 DIAGNOSIS — Z17.0 MALIGNANT NEOPLASM OF UPPER-INNER QUADRANT OF LEFT BREAST IN FEMALE, ESTROGEN RECEPTOR POSITIVE: Primary | ICD-10-CM

## 2022-12-09 DIAGNOSIS — G89.3 CANCER RELATED PAIN: ICD-10-CM

## 2022-12-09 LAB
ALBUMIN SERPL-MCNC: 2.4 G/DL (ref 3.5–5.2)
ALBUMIN/GLOB SERPL: 0.8 G/DL (ref 1.1–2.4)
ALP SERPL-CCNC: 189 U/L (ref 38–116)
ALT SERPL W P-5'-P-CCNC: 16 U/L (ref 0–33)
AMPHET+METHAMPHET UR QL: NEGATIVE
ANION GAP SERPL CALCULATED.3IONS-SCNC: 14.8 MMOL/L (ref 5–15)
AST SERPL-CCNC: 20 U/L (ref 0–32)
BACTERIA UR QL AUTO: ABNORMAL /HPF
BARBITURATES UR QL SCN: NEGATIVE
BASOPHILS # BLD AUTO: 0.05 10*3/MM3 (ref 0–0.2)
BASOPHILS NFR BLD AUTO: 0.5 % (ref 0–1.5)
BENZODIAZ UR QL SCN: POSITIVE
BILIRUB SERPL-MCNC: 0.2 MG/DL (ref 0.2–1.2)
BILIRUB UR QL STRIP: NEGATIVE
BUN SERPL-MCNC: 6 MG/DL (ref 6–20)
BUN/CREAT SERPL: 6.5 (ref 7.3–30)
CALCIUM SPEC-SCNC: 7.9 MG/DL (ref 8.5–10.2)
CANNABINOIDS SERPL QL: NEGATIVE
CHLORIDE SERPL-SCNC: 99 MMOL/L (ref 98–107)
CLARITY UR: CLEAR
CO2 SERPL-SCNC: 19.2 MMOL/L (ref 22–29)
COCAINE UR QL: NEGATIVE
COLOR UR: ABNORMAL
CREAT SERPL-MCNC: 0.93 MG/DL (ref 0.6–1.1)
DEPRECATED RDW RBC AUTO: 52.3 FL (ref 37–54)
EGFRCR SERPLBLD CKD-EPI 2021: 70.9 ML/MIN/1.73
EOSINOPHIL # BLD AUTO: 0.11 10*3/MM3 (ref 0–0.4)
EOSINOPHIL NFR BLD AUTO: 1.1 % (ref 0.3–6.2)
ERYTHROCYTE [DISTWIDTH] IN BLOOD BY AUTOMATED COUNT: 14.8 % (ref 12.3–15.4)
GLOBULIN UR ELPH-MCNC: 3.1 GM/DL (ref 1.8–3.5)
GLUCOSE SERPL-MCNC: 150 MG/DL (ref 74–124)
GLUCOSE UR STRIP-MCNC: NEGATIVE MG/DL
HCT VFR BLD AUTO: 32.9 % (ref 34–46.6)
HGB BLD-MCNC: 9.9 G/DL (ref 12–15.9)
HGB UR QL STRIP.AUTO: NEGATIVE
IMM GRANULOCYTES # BLD AUTO: 0.07 10*3/MM3 (ref 0–0.05)
IMM GRANULOCYTES NFR BLD AUTO: 0.7 % (ref 0–0.5)
KETONES UR QL STRIP: NEGATIVE
LEUKOCYTE ESTERASE UR QL STRIP.AUTO: ABNORMAL
LYMPHOCYTES # BLD AUTO: 0.38 10*3/MM3 (ref 0.7–3.1)
LYMPHOCYTES NFR BLD AUTO: 3.8 % (ref 19.6–45.3)
MCH RBC QN AUTO: 28.9 PG (ref 26.6–33)
MCHC RBC AUTO-ENTMCNC: 30.1 G/DL (ref 31.5–35.7)
MCV RBC AUTO: 96.2 FL (ref 79–97)
METHADONE UR QL SCN: NEGATIVE
MONOCYTES # BLD AUTO: 1.02 10*3/MM3 (ref 0.1–0.9)
MONOCYTES NFR BLD AUTO: 10.1 % (ref 5–12)
NEUTROPHILS NFR BLD AUTO: 8.49 10*3/MM3 (ref 1.7–7)
NEUTROPHILS NFR BLD AUTO: 83.8 % (ref 42.7–76)
NITRITE UR QL STRIP: NEGATIVE
NRBC BLD AUTO-RTO: 0 /100 WBC (ref 0–0.2)
OPIATES UR QL: NEGATIVE
OXYCODONE UR QL SCN: POSITIVE
PH UR STRIP.AUTO: 7 [PH] (ref 4.5–8)
PLATELET # BLD AUTO: 199 10*3/MM3 (ref 140–450)
PMV BLD AUTO: 9.7 FL (ref 6–12)
POTASSIUM SERPL-SCNC: 2.8 MMOL/L (ref 3.5–4.7)
PROT SERPL-MCNC: 5.5 G/DL (ref 6.3–8)
PROT UR QL STRIP: NEGATIVE
RBC # BLD AUTO: 3.42 10*6/MM3 (ref 3.77–5.28)
RBC # UR STRIP: ABNORMAL /HPF
REF LAB TEST METHOD: ABNORMAL
SODIUM SERPL-SCNC: 133 MMOL/L (ref 134–145)
SP GR UR STRIP: 1.01 (ref 1–1.03)
SQUAMOUS #/AREA URNS HPF: ABNORMAL /HPF
TRANS CELLS #/AREA URNS HPF: ABNORMAL /HPF
UROBILINOGEN UR QL STRIP: ABNORMAL
WBC # UR STRIP: ABNORMAL /HPF
WBC NRBC COR # BLD: 10.12 10*3/MM3 (ref 3.4–10.8)

## 2022-12-09 PROCEDURE — 25010000002 HEPARIN LOCK FLUSH PER 10 UNITS: Performed by: INTERNAL MEDICINE

## 2022-12-09 PROCEDURE — 80307 DRUG TEST PRSMV CHEM ANLYZR: CPT | Performed by: NURSE PRACTITIONER

## 2022-12-09 PROCEDURE — 85025 COMPLETE CBC W/AUTO DIFF WBC: CPT | Performed by: NURSE PRACTITIONER

## 2022-12-09 PROCEDURE — 0 POTASSIUM CHLORIDE 10 MEQ/100ML SOLUTION: Performed by: NURSE PRACTITIONER

## 2022-12-09 PROCEDURE — 80053 COMPREHEN METABOLIC PANEL: CPT | Performed by: NURSE PRACTITIONER

## 2022-12-09 PROCEDURE — 99215 OFFICE O/P EST HI 40 MIN: CPT | Performed by: NURSE PRACTITIONER

## 2022-12-09 PROCEDURE — 96360 HYDRATION IV INFUSION INIT: CPT

## 2022-12-09 PROCEDURE — 96367 TX/PROPH/DG ADDL SEQ IV INF: CPT

## 2022-12-09 PROCEDURE — 96365 THER/PROPH/DIAG IV INF INIT: CPT

## 2022-12-09 PROCEDURE — 87086 URINE CULTURE/COLONY COUNT: CPT

## 2022-12-09 PROCEDURE — 81001 URINALYSIS AUTO W/SCOPE: CPT

## 2022-12-09 RX ORDER — HEPARIN SODIUM (PORCINE) LOCK FLUSH IV SOLN 100 UNIT/ML 100 UNIT/ML
500 SOLUTION INTRAVENOUS AS NEEDED
Status: CANCELLED | OUTPATIENT
Start: 2022-12-09

## 2022-12-09 RX ORDER — POTASSIUM CHLORIDE 20 MEQ/1
20 TABLET, EXTENDED RELEASE ORAL 2 TIMES DAILY
Qty: 30 TABLET | Refills: 3 | Status: CANCELLED | OUTPATIENT
Start: 2022-12-09

## 2022-12-09 RX ORDER — SODIUM CHLORIDE 9 MG/ML
1000 INJECTION, SOLUTION INTRAVENOUS ONCE
Status: COMPLETED | OUTPATIENT
Start: 2022-12-09 | End: 2022-12-09

## 2022-12-09 RX ORDER — POTASSIUM CHLORIDE 7.45 MG/ML
10 INJECTION INTRAVENOUS ONCE
Status: COMPLETED | OUTPATIENT
Start: 2022-12-09 | End: 2022-12-09

## 2022-12-09 RX ORDER — SODIUM CHLORIDE 0.9 % (FLUSH) 0.9 %
10 SYRINGE (ML) INJECTION AS NEEDED
Status: CANCELLED | OUTPATIENT
Start: 2022-12-09

## 2022-12-09 RX ORDER — SODIUM CHLORIDE 0.9 % (FLUSH) 0.9 %
10 SYRINGE (ML) INJECTION AS NEEDED
Status: DISCONTINUED | OUTPATIENT
Start: 2022-12-09 | End: 2022-12-09 | Stop reason: HOSPADM

## 2022-12-09 RX ORDER — HEPARIN SODIUM (PORCINE) LOCK FLUSH IV SOLN 100 UNIT/ML 100 UNIT/ML
500 SOLUTION INTRAVENOUS AS NEEDED
Status: DISCONTINUED | OUTPATIENT
Start: 2022-12-09 | End: 2022-12-09 | Stop reason: HOSPADM

## 2022-12-09 RX ORDER — POTASSIUM CHLORIDE 750 MG/1
20 CAPSULE, EXTENDED RELEASE ORAL 2 TIMES DAILY
Qty: 60 CAPSULE | Refills: 1 | Status: SHIPPED | OUTPATIENT
Start: 2022-12-09 | End: 2023-01-11 | Stop reason: SDUPTHER

## 2022-12-09 RX ADMIN — POTASSIUM CHLORIDE 10 MEQ: 7.46 INJECTION, SOLUTION INTRAVENOUS at 08:56

## 2022-12-09 RX ADMIN — POTASSIUM CHLORIDE 10 MEQ: 7.46 INJECTION, SOLUTION INTRAVENOUS at 10:01

## 2022-12-09 RX ADMIN — Medication 500 UNITS: at 11:01

## 2022-12-09 RX ADMIN — SODIUM CHLORIDE 1000 ML/HR: 9 INJECTION, SOLUTION INTRAVENOUS at 07:51

## 2022-12-09 RX ADMIN — Medication 10 ML: at 11:01

## 2022-12-09 NOTE — PROGRESS NOTES
[facility] INPATIENT PROGRESS NOTE    Length of Stay:  0 days    CHIEF COMPLAINT/REASON FOR VISIT:  Metastatic non-small cell lung cancer  Bilateral breast cancer    Interval history  Patient returns today for follow-up and IV fluids.  She is seen today with her  present.  Her mental status has somewhat improved.  She is now oriented to person, place, and time.  She continues to remain weak, and is unable to ambulate on her own.  Her  reports that her mental status has slowly been improving since discontinuing long-acting pain medicine.  She is having a difficult time with nutrition, and is not eating much food.  Her weight has declined significantly over the last few weeks.  He reports she does well with liquid, and is drinking well.  We discussed adding boost throughout the day, and encouraging the patient to graze and eat frequently throughout the day, to which she is agreeable.  She has occasional nausea, controlled with Zofran, no emesis.  Pain is well controlled with her current regimen.  Bowels moving regularly.  Denies fever or chills.  Denies new or worsening pain.    Oncologic history  Ms. 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     3/1/2021-bilateral screening mammogram-microcalcifications seen in the posterior one third of the lateral aspect of the right breast.  Area of focal asymmetry seen in the middle one third of the upper inner quadrant of the left breast.     3/1/2021-DEXA scan-T score of -2.2 on the lumbar spine and T score of -2.3 in the left hip and T score of -1.9 in the right hip.  Findings consistent with osteopenia     3/23/2021-screening lung CT-there is a 10 x 11 mm solid nodule in the left upper lobe.  Enlarged AP window lymph node measuring 14 x 10 mm.  Suggestion of a possible 9 mm left hilar lymph node.  Heavy coronary artery calcification.     3/23/2021-diagnostic  mammogram bilateral-cluster of microcalcifications in the middle third lateral aspect of the right breast.  Left breast demonstrates persistence of the area of focal asymmetry in the region.     Ultrasound-left breast ultrasound at 10 o'clock position, 6 cm from the nipple there is a 0.4 cm irregular hypoechoic lesion.  Stereotactic biopsy of the right breast calcifications recommended.  Ultrasound-guided biopsy of the left breast lesion recommended     4/7/2021-right breast stereotactic biopsy and left breast ultrasound-guided biopsy  Pathology  Right breast-invasive ductal carcinoma, grade 2, lymphovascular invasion present, ER +99% strong, VT +80% moderate, HER-2 negative, Ki-67 12%  Left breast upper inner quadrant 10 o'clock position biopsy, invasive ductal carcinoma, grade   2, ER +99% strong, VT +40% strong, HER-2 2+ on immunohistochemistry, nonamplified on FISH Ki-67 10%     4/14/2021-PET/CT-FDG avid aortopulmonary window lymph node measures 0.9 cm with an SUV of 7.5.  FDG avid left hilar lymph node measures 1 cm with an SUV of 17.2.  Irregular soft tissue density in the right breast measuring 3.7 x 3.8 cm is favored to be secondary to the recent breast biopsy.  1.1 cm pulmonary nodule within the left upper lobe with SUV 9.7 .  Sub-6 mm pulmonary nodules are present in the right lower lobe.  No evidence of lymphadenopathy or metastatic disease in the abdomen.  Focal area of FDG uptake within the central canal posterior to T11-T12 displaced demonstrating an SUV of 5.5 thought to be reactive however MRI is recommended for further evaluation.     She was seen by Dr. Molina who initially planned for breast surgery however  due to the PET abnormalities she has been referred to Dr. Kerr who plans to do a wound on 4/30/2021.  Dr. Molina's and Dr. Kerr's notes reviewed.     Patient denies any family history of breast cancer.  Her mother had lymphoma.  Maternal grandmother had colon cancer.  Prior to that  screening mammogram she did not have any palpable abnormalities of either breast.     She has been a heavy smoker for about 40 years and smoked 2 packs/day.  Denies any recent weight changes, new bone pains, cough, abdominal pain nausea vomiting constipation or diarrhea.  She does have anxiety and has been on several medications.  She has recently been started on Xanax to help with the mood and also with insomnia.     Patient had a video-assisted thoracoscopy and mediastinal lymphadenectomy on 4/30/2021.  L5-L6 lymph nodes were biopsied however the small pulmonary nodule in the left upper lobe was not difficult to find.  Pathology showed moderately differentiated adenocarcinoma which is CK7 and TTF-1 positive.  Consistent with lung primary.  Ki-67 85%.     5/14/2021-MRI of the brain-minimal chronic small vessel ischemic change in the white matter.  Otherwise negative MRI.     5/20/2021-bilateral axillary ultrasound-no evidence of axillary lymphadenopathy in either axilla.  Normal-appearing bilateral axillary lymph nodes visualized.     Cycle 1 cisplatin and Alimta on 5/25/2021.     Cycle 2 cisplatin Alimta 6/15/2021     Cycle 3 cisplatin Alimta 7/7/2021     Completed radiation on 7/12/2021     Port was nonfunctional hence a port study was performed which showed that the port was backed up into the innominate vein and also there was a nonocclusive thrombus at the end of the catheter extending into the superior vena cava.  She was started on anticoagulation with Eliquis.  It was recommended for port revision of the intention to keep the port.     PET/CT 8/5/2021-images independently reviewed and interpreted by me-decrease in size and FDG uptake of the left upper lobe pulmonary nodule as well as mediastinal and left hilar lymphadenopathy representing response to treatment.  Sub-6 mm pulmonary nodule in the left upper lobe new since 4/14/2021.  This could be related to radiation however follow-up CT in 3 months  recommended.  Decrease in size of the irregular masslike tissue in the right breast which is postbiopsy hematoma.  This is not PET avid.  New segmental left eighth rib fractures healing.  A new band of sclerosis of the left seventh rib which favored to represent healing nondisplaced fracture.  Follow-up CT recommended.  focal uptake in the duodenum first and second portions.  Could be related to duodenitis.  Indeterminate lesion in the L1 vertebral body not well evaluated on PET/CT.     10/1/2021-MRI of the lumbar spine.  Lesion in the left posterior body of L1 measures 14 x 14 x 12 mm and previously 5 x 5 x 6 mm.  The interval enlargement strongly suggest that it is metastatic lesion.  No additional osseous metastasis noted.   Other chronic changes noted.     10/14/2021-biopsy of the lumbar spine lesion-pathology consistent with poorly differentiated carcinoma.  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     10/26/2021-brain MRI-no evidence of metastatic disease.     10/26/2021-bilateral diagnostic mammogram and ultrasound  Scattered fibroglandular density in both breast.  Postbiopsy hematoma with internal biopsy clip in the upper outer quadrant of the right breast, 8 cm from the nipple.  The hematoma size is decreased to 2.9 cm from 3.6 cm earlier.     Left breast-parenchymal density measuring 9 x 10 mm has markedly decreased.  Few adjacent calcifications which are unchanged.  No new abnormality in the left breast.  At 10:00 region there is some minimal adjacent hypoechoic texture which is difficult to measure but appears smaller since the previous exam.     10/28/2021-PET/CT  New L1 and L2 lytic bone metastasis annually intensely hypermetabolic focus at the left adrenal gland likely represents metastatic disease as well.  Slight decrease in size of the 0.9 x 0.7 cm left upper lobe pulmonary nodule.  There is hypermetabolic activity  related to radiation pneumonitis.  The remainder of the nodule is photopenic.  Uncertain etiology of the more intense activity along the right lateral peripheral margin of the 2.6 cm postbiopsy density of the right breast.     She completed radiation to to the lumbar spine on 11/19/2021 11/22/2021-cycle 1 Alimta and Keytruda     3/23/2022-bilateral diagnostic mammogram and ultrasound  Complete resolution of the microcalcifications in the upper outer quadrant of the right breast and decrease in size of the postbiopsy hematoma.  No suspicious abnormalities in the right breast.  Benign-appearing calcifications at the site of malignancy in the left breast upper inner quadrant.  No other suspicious findings.     4/11/2022 PET/CT-there is new groundglass opacities in the left upper lobe which are most likely postradiation changes.  Other groundglass opacities in the left lung as well as the right lung remained stable.  Increased nodularity of the left adrenal gland with an SUV of 5.6, previously 3.5.    Increased uptake in the L1 vertebra in the right posterior aspect with an SUV of 3.6.  Left L1 sclerosis increased     Due to this the case was discussed in multidisciplinary conference.  The disease progression was significant in the left adrenal gland as well as the L1 vertebra.  Since there were only 2 areas of disease progression it has been decided to proceed with radiation to these 2 spots and continue on Keytruda and Alimta.     Patient also has had worsening of her kidney function.  We initially held treatment as of 5/31/2022 and creatinine bumped up to 2.0, BUN 23.  We then resumed therapy 6/11/2022 with Keytruda alone.  Patient was referred to nephrology.     6/6/2022-MRI of the spine-diffuse marrow replacement in the L1 vertebral body and inferior endplate concavity.  Suspicious for metastatic disease with pathological fracture in the inferior endplate.  Also diffuse signal abnormality in the L2 lamina on  the left suspicious for additional metastatic disease.  Abnormality of the first sacral segment suspicious for metastatic disease.  Left adrenal gland appears enlarged.  30 to 40% height loss and L2 vertebral body suggestive of a subacute compression fracture.  Degenerative changes.     7/18/2022-PET/CT  Multiple hypermetabolic osseous lesions with index lesions which have either increased in degree of FDG uptake or newly hypermetabolic lesions representative of metastatic disease and progression of disease.  Possible FDG avid lesion in the left femoral diaphysis however these are incompletely visualized and in the area of artifactual FDG uptake.  MRI of the left femur recommended for further evaluation.  Increasing FDG uptake of the left adrenal gland.  Focal left hilar hypermetabolic him corresponds to the lymph node which has minimally increased in size compared to April 2022.  New sub-6 mm pulmonary nodules.     7/26/2022-cycle 1 of Taxotere     Patient was admitted to the hospital 8/3/2021 discharged on 8/9/2022.  She was admitted for strokelike symptoms and confusion.  The confusion was thought to be secondary to polypharmacy and probably febrile neutropenia.  She was treated with antibiotics.  Mental status improved subsequently.  MRI of the cervical thoracic and lumbar spine was performed on 8/4/2022.  Images independently reviewed by me.  Multiple metastatic lesions in the spine noted.  There was a lesion on the CT which was concerning.  There is also a sacral Lesion measuring up to 5 cm in transverse dimension on the right.  Patient was symptomatic with pain on the right side of the sacrum.  Radiation oncology recommended radiation to the cervical spine as well as sacrum.  CT head without any obvious abnormalities.     Completed radiation to the cervical spine and sacrum on 8/12/2022    She was admitted to the hospital between 9/23/2022 to 10/12/2022.  She was admitted for poorly controlled pain as well as  encephalopathy and multiple falls.  She was diagnosed with a UTI and treated with IV Rocephin.  Pain medications changed and also additional radiation to the right sacrum was performed.  3000 cGy in 10 fractions was administered.  She was discharged on oxycodone 15 mg every 4 hours as needed for pain and OxyContin twice daily.  She was recommended to go to a subacute rehab however she preferred to be discharged home with home physical therapy and Occupational Therapy    9/24/2022-MRI of the pelvis showed osseous metastatic disease in the sacrum worst within the right and probable pathological fractures bilaterally.    10/4/2022 CT head without any evidence of metastatic disease or acute abnormalities.    10/25/2022-PET/CT-resolution of hypermetabolic activity in the upper lobe and left hilum.  Previously noted new tiny pulmonary nodules have resolved.  Resolution of hypermetabolic activity in the left adrenal gland.  Near complete resolution of hypermetabolic activity in the skeletal metastasis particularly the cervical spine, pelvic bones and proximal left femur.  Maximal SUV at the right sacral metastasis is 2.7 and previously it was 8.9.  Tiny focus of hypermetabolic activity at the GE junction with a maximal SUV of 4.7.  No definite thickening or abnormality noted on the CT.    11/15/2022-lumbar puncture with negative cytology of the CSF.    11/21/2022-MRI of the brain with contrast and MRI of the cervical spine with contrast without any evidence of metastatic disease.    PHYSICAL EXAMINATION:    General: Oriented to person, place, and time.  Ill-appearing.  Pale.  HEENT: Periorbital edema has resolved.  Chest/Lungs: Clear to auscultation bilaterally. Right chest mediport in place  Heart: RRR  Abdomen/GI: soft, NT, ND, NABS  Extremities: Bilateral lower extremity without any swelling.  Decreased strength in both lower extremities.  No focal deficits.    DIAGNOSTIC DATA:  Results Review:     I reviewed the  patient's new clinical results.    Results from last 7 days   Lab Units 12/09/22  0804   WBC 10*3/mm3 10.12   HEMOGLOBIN g/dL 9.9*   HEMATOCRIT % 32.9*   PLATELETS 10*3/mm3 199     Lab Results   Component Value Date    NEUTROABS 8.49 (H) 12/09/2022     Results from last 7 days   Lab Units 12/09/22  0804   SODIUM mmol/L 133*   POTASSIUM mmol/L 2.8*   CHLORIDE mmol/L 99   CO2 mmol/L 19.2*   BUN mg/dL 6   CREATININE mg/dL 0.93   GLUCOSE mg/dL 150*   CALCIUM mg/dL 7.9*                 IMAGING:      PET/CT 10/25/2022-images independently reviewed and interpreted by me in detail summarized above    ASSESSMENT:  This is a 57 y.o. female with:     *Metastatic non-small cell lung cancer  · She was initially diagnosed with stage III (T1N2M0) disease.    · She received initial therapy with cisplatin and Alimta concurrent with radiation therapy beginning 5/25/2021.    · There were indeterminate findings in the lumbar spine at L1 and it was recommended to monitor this over time.    · Patient completed 3 cycles cisplatin and Alimta 7/7/2021 and completed radiation therapy 7/12/2021.    · PET scan 8/6/2021 showed good response to treatment.    · MRI lumbar spine 10/1/2021 with increase in size of the L1 lesion.    · Biopsy of the L1 lesion on 10/14/2022 confirmed metastatic adenocarcinoma of lung origin, PD-L1 TPS 0.    · PET scan 10/26/2021 with involvement of left adrenal and L1/L2 only.    · Patient received radiation therapy to L1/L2 on 11/19/2021.  She also received radiation to the left adrenal gland.    · She initiated further systemic therapy on 11/22/2021 with Keytruda and Alimta.   · Follow-up PET scan 1/18/2022 with decrease in small left upper lobe pulmonary nodule, resolution of activity at L1/L2, no new sites of disease.    · Guardant 360 CT DNA level was monitored and was decreasing.    · PET scan 4/11/2022 with progression in left adrenal and L1.    · Maintained systemic therapy with Keytruda and Alimta and  received additional radiation to left adrenal and L1.  Alimta however held since 5/10/2022 due to renal dysfunction.  Radiation completed to lumbar spine 7/14/2022.    · PET scan 7/18/2022 with multiple areas of progression of the spine and right sacrum.    · Guardant 360 analysis 7/20/2022 with no actionable mutations.    · Change in therapy to single agent palliative Taxotere initiated 7/26/2022.    · Altered mental status and febrile neutropenia requiring hospitalization 8/2 - 8/9/2022.  During that admission, MRI brain, cervical, thoracic, lumbar spine performed 8/4/2022 in addition to CT cervical spine 8/5/2022.  There was evidence of C2 metastatic lesion with some dural enhancement, compression deformity at T7 with mild edema suggesting acute to subacute fracture, 5 mm T12 spinous process metastasis, multifocal disease in the lumbar spine and sacrum, largest involving sacral ala to the right 5 cm in transverse dimension.  Loss of height at L2 25%.  · Patient received cycle 3 Taxotere 9/6/2022 with Neulasta support.    · She has been continuing as well on Xgeva every 4 weeks (last received 9/6/2022).  · Patient did undergo MRI lumbar spine and pelvis on 9/24/2022.  MRI pelvis showed bilateral sacral fractures with marrow infiltration related to metastasis more prominent on the right.  Lesion on the right 3.5 x 4.1 x 4.3 cm.  Also probable metastasis along posterior acetabulum on the right 1.5 cm.  MRI lumbar spine with stable L1 metastasis, new edema endplate T12 possibly stress reaction versus developing new metastasis, lesion at T12 spinous process unchanged.   · Radiation oncology consulted with plans for palliative treatment of the right sacral metastasis.  Treatment initiated 9/28/2022  · Completed radiation to the right sacral metastasis on 10/11/2022.  · PET/CT 10/25/2022 without any metabolic uptake in the lung or the skeletal metastasis.  This is indicative of a positive response to Taxotere.  · Pain  persistent sitting and walking position and not present when she is laying down.  · Patient is still very active and performance status is very poor.  Due to this reason chemotherapy will be continued to be held.  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  · Due to this reason an MRI of the brain was obtained but unfortunately this was performed without contrast.  Reviewed the MRI and no evidence of metastatic disease.  · 11/15/2022 lumbar puncture shows no evidence of malignancy in the CSF, elevated protein at 65.2, glucose normal at 58, culture no growth in 3 days  · Repeat MRI of the brain with contrast 11/21/2022 does not show any obvious abnormalities.  There is no enhancement of the CSF.  MRI of the cervical spine with no abnormalities.  · Patient unable to undergo MRI of the thoracic and lumbar spine due to mental status and unable to being in 1 position as well as with incontinence.  · 12/9/2022: Mental status has improved since discontinuing long-acting OxyContin.  She is now oriented to person, place, and time.  She does continue to remain weak.  Again recommended PT and OT, which has already been ordered.  The patient has been having a difficult time with nutrition due to decreased appetite.  She is able to drink a lot of fluid, however no appetite for food.  Discussed starting boost 2 times daily to help increase her nutrition.  Also discussed collecting urine for testing due to her previous altered mental status, to which the patient has been are agreeable.   also discusses that hospice care has previously been suggested to him, and they are not yet ready to think about hospice since her mental status is improving.     *History of bilateral stage I breast cancer  · Patient with bilateral breast cancer, both stage I ([id number]), grade 2, ER/KS positive and HER2/melinda negative with low Ki-67.    · Patient initiated adjuvant  letrozole in May 2021.  · Continue letrozole    *Mental status changes  · Patient is totally disoriented and unable to hold conversation  · Actively hallucinating.  · The etiology of this is unclear.  · We have done a PET/CT, lumbar puncture and MRI of the brain and cervical spine which did not show any evidence of worsening of malignancy.  There is no evidence of leptomeningeal disease so far.  · The mental status changes are totally unexplained.  · She has been referred to neurology for further evaluation of the same and no clear explanation from their standpoint either.  Neurology note reviewed.  · Next assessed 12/9/2022: She is now oriented to person, place, and time.  Her  feels her mental status slowly been improving since discontinuing long-acting pain medicine.  She still remains weak.     *Cancer related pain  · Patient has been receiving Duragesic patch 50 mcg every 72 hours in addition to oxycodone 15 mg every 4 hours for breakthrough pain.  Duragesic dose had been escalated recently in the outpatient setting due to worsening pain  · On admission 9/23/2022, Duragesic patch increased to 75 mcg daily, added Decadron 4 mg IV every 6 hours with continuation of oxycodone 15 mg every 4 hours as needed for breakthrough pain in addition of Dilaudid 1 mg every 2 hours as needed for breakthrough pain.  · Patient with increasing side effects from narcotics, Duragesic decreased on 9/25/2022 from 75 down to 50 mcg patch every 72 hours.  · Dexamethasone dose decreased to 4 mg p.o. every 12 hours on 9/27/2022  · Initiated palliative radiation to the right sacrum on 9/28/2022  · Patient became confused after receiving Dilaudid.  Patient and family asked to discontinue Dilaudid.  · Duragesic patch dose was increased to 75 mcg/h on 9/30/2022. Subsequent decrease to 50 mcg due to confusion  · Gabapentin was discontinued due to the sedating effect.  · Oxycodone is being used for breakthrough pain.  · 10/3 transition  to sustained release oxycodone 40 mg bid and fentanyl patch stopped  · Poorly controlled, pain particularly worse in sitting and standing position.  · Unsure if extends is contributing to mental status changes as previously patient had been very sensitive to pain meds.  · Due to this reason we will discontinue the Xtampza ER and switch to OxyContin 20 mg twice daily.  · She continues to experience severe mental status changes.  · Will discontinue OxyContin and continue only on as needed oxycodone to see if there would be any improvement in symptoms.  · Continues on oxycodone as needed for pain.  Feels her pain is well controlled on this regimen.      *Anxiety/depression  · Patient was previously on Wellbutrin  mg daily, Lexapro 10 mg daily.  · On 9/30/2022, she was switched from Lexapro to Cymbalta 30 mg daily.  · Atarax initiated for restlessness, and helping with symptom  · Patient is disoriented to time place and person and hallucinating.     *Insomnia  · Her  has been administering Xanax as needed.     *Anemia  · Most recent evaluation 8/3/2022 with iron 15, ferritin 276, iron saturation 5%, TIBC 328, folate greater than 20, B12 392  · Anemia felt to be related to malignancy/chronic disease and chemotherapy  · Hemoglobin today 9.9    *Thrombocytopenia  · New since the past 2 visits   · Platelet count today 199,000.     *Peripheral neuropathy  · Patient was on gabapentin 300 mg twice daily.  · Gabapentin was discontinued on 9/30/2022.  · Continue Cymbalta  · Neuropathy stable     *Narcotic induced constipation  · Patient is on Sybil-Colace 2 tablets twice daily with MiraLAX daily   · Has been constipated, resume bowel regimen which was previously being held due to diarrhea     *GI prophylaxis  · Protonix 40 mg daily     *Elevated liver labs  · Atorvastatin was discontinued  · LFTs normal    *BLE calf pain  · BLE venous duplex negative. Exam reassurring    *Fall with right scalp  hematoma  · Improving    *Deconditioning  · Patient continues to be very sedentary and in bed most of the time.  · She is progressively getting weaker and I do not believe that she will be able to tolerate chemotherapy with her current functional status.  · Recommend physical therapy and Occupational Therapy.  · Still spending most of her time in bed.  Not eating much so her weight has significantly declined.  Again recommended they start working with PT and OT.  · Urine checked today due to previous mental status changes.  Trace bacteria, otherwise no signs of infection.      *Hypokalemia  · Potassium today 2.8.  Replace IV potassium 40 mEq in clinic today.  We will start patient on oral potassium 40 mill equivalents daily, sprinkle capsules prescribed because patient is unable to take potassium pills.    RECOMMENDATIONS:  1. Completed radiation 10/11/2022C  2. Continues on Cymbalta  3. 1 L normal saline today  4. 40 mill equivalents IV potassium in clinic today.  5. Start oral potassium 40 mill equivalents daily, sprinkle capsule.  Sent to pharmacy.  6. Return in 2 weeks for MD follow-up.  7. Start boost twice daily.  Graze throughout the day to increase nutrition.  8. Start PT and OT as previously prescribed.    I spent 60 minutes caring for Holli on this date of service. This time includes time spent by me in the following activities: preparing for the visit, reviewing tests, obtaining and/or reviewing a separately obtained history, performing a medically appropriate examination and/or evaluation, counseling and educating the patient/family/caregiver, ordering medications, tests, or procedures, documenting information in the medical record and care coordination.       KORI Guerrero

## 2022-12-09 NOTE — NURSING NOTE
Pt here for follow up with Brenda SHEIKH and 1L IVFs.   Lab Results   Component Value Date    GLUCOSE 150 (H) 12/09/2022    BUN 6 12/09/2022    CREATININE 0.93 12/09/2022    EGFRIFNONA 46 (L) 02/14/2022    EGFRIFAFRI 95 11/24/2020    BCR 6.5 (L) 12/09/2022    K 2.8 (C) 12/09/2022    CO2 19.2 (L) 12/09/2022    CALCIUM 7.9 (L) 12/09/2022    PROTENTOTREF 6.7 08/02/2022    ALBUMIN 2.40 (L) 12/09/2022    LABIL2 0.8 08/02/2022    AST 20 12/09/2022    ALT 16 12/09/2022   Potassium 2.8 today. Per APRN give 20mEq IV potassium d/t pt not being able to tolerate oral. Pt agreeable to plan. Prescription sent to pt's pharmacy for potassium sprinkles.   Urine tox and U/A obtained at end of infusion per APRN order.

## 2022-12-10 LAB — BACTERIA SPEC AEROBE CULT: NO GROWTH

## 2022-12-12 DIAGNOSIS — C34.90 NON-SMALL CELL LUNG CANCER METASTATIC TO BONE: ICD-10-CM

## 2022-12-12 DIAGNOSIS — C79.51 NON-SMALL CELL LUNG CANCER METASTATIC TO BONE: ICD-10-CM

## 2022-12-12 RX ORDER — OXYCODONE HYDROCHLORIDE 15 MG/1
15 TABLET ORAL EVERY 4 HOURS PRN
Qty: 120 TABLET | Refills: 0 | Status: SHIPPED | OUTPATIENT
Start: 2022-12-12 | End: 2023-01-11 | Stop reason: SDUPTHER

## 2022-12-23 ENCOUNTER — APPOINTMENT (OUTPATIENT)
Dept: ONCOLOGY | Facility: HOSPITAL | Age: 57
End: 2022-12-23
Payer: COMMERCIAL

## 2022-12-23 ENCOUNTER — APPOINTMENT (OUTPATIENT)
Dept: LAB | Facility: HOSPITAL | Age: 57
End: 2022-12-23
Payer: COMMERCIAL

## 2022-12-23 ENCOUNTER — DOCUMENTATION (OUTPATIENT)
Dept: RADIATION ONCOLOGY | Facility: HOSPITAL | Age: 57
End: 2022-12-23

## 2022-12-23 DIAGNOSIS — C79.51 NON-SMALL CELL LUNG CANCER METASTATIC TO BONE: Primary | ICD-10-CM

## 2022-12-23 DIAGNOSIS — C34.90 NON-SMALL CELL LUNG CANCER METASTATIC TO BONE: Primary | ICD-10-CM

## 2022-12-23 NOTE — PROGRESS NOTES
Radiation Treatment Summary Note      Patient Name: Holli Patel  : 1965    Attending Provider: Yoandy Katz MD McMahan, Crystal H., MD      Diagnosis:     ICD-10-CM ICD-9-CM   1. 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)  C34.90 162.9    C79.51 198.5       Radiation Start Date: 22    Radiation Completion Date: 10/11/22    Elapsed days: 13    Site: sacrum/right acetabulum    Dose and technique: 3000cGy in 10 fractions delivered with vmat using 6 mv photons    Final Delivered Dose Deviated From Initially Prescribed Dose: No    Concurrent Chemotherapy: No    Patient Tolerated Treatment Without Unexpected Side Effects/Complications: Yes    ECOG: Fully active, able to carry on all pre-disease performance without restriction = 0    Pain Management Plan: Opioid or other pain medication prescribed by other provider    Follow-Up Plan: PRN    Imaging Ordered for Follow-Up: None/NA        Patty Franklin MD

## 2022-12-27 ENCOUNTER — TELEPHONE (OUTPATIENT)
Dept: FAMILY MEDICINE CLINIC | Facility: CLINIC | Age: 57
End: 2022-12-27

## 2022-12-27 DIAGNOSIS — G89.3 CANCER RELATED PAIN: Primary | ICD-10-CM

## 2022-12-27 RX ORDER — DULOXETIN HYDROCHLORIDE 30 MG/1
30 CAPSULE, DELAYED RELEASE ORAL NIGHTLY
Qty: 30 CAPSULE | Refills: 1 | Status: SHIPPED | OUTPATIENT
Start: 2022-12-27 | End: 2023-02-09

## 2022-12-27 NOTE — TELEPHONE ENCOUNTER
I called the patients  and discussed his concerns.  She was recently restarted on duloxetine at a higher dose, 60 mg nightly.  However, she has been having some worsening mental status problems. having full conversations with herself in her sleep and twitching in her sleep.  She was doing better on the 30 mg dose and wondering if she can go back to the same dose.  I was glad to approve the order, sent to Astrid, I will notify her PCP of the change.

## 2022-12-27 NOTE — TELEPHONE ENCOUNTER
"Pt needs apt.   Please advise in the meantime.    Per 's 12/2/22 office note:    \"Cancer related pain  -remains uncontrolled  Failed multiple medications, see above HPI.  She has a history of being very sensitive to pain medications.  Plan at this time is to discontinue long-acting OxyContin (to see if this helps with mental status, as  really believes this may help) and continue her immediate release oxycodone every 4 hours.  Also, I suggested increasing Cymbalta from 30 to 60 mg daily and stopping Zocor altogether.  Recommend referral back to hospital for adequate pain control and or strongly considering hospice services as she does have a Living Will in place.  Patient well-known to me, I have been her PCP x20 years.  Discussed with  today.  He would like to see how this new plan works in regards to discontinuing long-acting pain medication and see if there is any improvement before coming up with a new plan.     Altered sensorium  -remains uncontrolled, especially over the last 3 to 4 weeks.  Extensive work-up done during hospitalization as well as most recently outpatient, see above HPI.  Etiology unknown.  Thus far no evidence of leptomeningeal disease, however oncologist plans to reevaluate in 1 week.  Possibly due to her intolerance to pain medications/sensitivity?  Lab work has remained stable.  Plan to recheck urine culture today, sent home with potty hat and specimen cup.   will bring back.  Plan is to discontinue long-acting OxyContin altogether and only use immediate release oxycodone.  We will also increase her Xanax to 4 times daily dosing, may consider XR Xanax?     Anxiety  -remains uncontrolled  See above plan.  Star report reviewed, appropriate.  Urine Tox screen up-to-date.  Plan to increase Xanax from BID to QID dosing, may consider XR Xanax\"    "

## 2022-12-27 NOTE — TELEPHONE ENCOUNTER
Caller: BK CARPENTER    Relationship: Emergency Contact    Best call back number: [phone]    What medications are you currently taking:   Current Outpatient Medications on File Prior to Visit   Medication Sig Dispense Refill   • ALPRAZolam (XANAX) 0.5 MG tablet 1 p.o. every 6 hours, change in quantity, metastatic cancer 120 tablet 2   • buPROPion XL (WELLBUTRIN XL) 150 MG 24 hr tablet Take 1 tablet by mouth Daily. 30 tablet 5   • DULoxetine (CYMBALTA) 60 MG capsule Take 1 capsule by mouth Every Night for 21 doses. 30 capsule 5   • folic acid (FOLVITE) 1 MG tablet TAKE ONE TABLET BY MOUTH DAILY 30 tablet 3   • letrozole (FEMARA) 2.5 MG tablet Take 1 tablet by mouth Daily. 90 tablet 3   • levothyroxine (SYNTHROID, LEVOTHROID) 25 MCG tablet Take 1 tablet by mouth Daily. (Patient taking differently: Take 25 mcg by mouth Daily. Patient states she no longer takes this medication) 90 tablet 1   • omeprazole (PrilOSEC) 20 MG capsule Take 1 capsule by mouth 2 (Two) Times a Day. 180 capsule 1   • oxyCODONE (oxyCONTIN) 20 MG 12 hr tablet Take 1 tablet by mouth Every 12 (Twelve) Hours. 60 tablet 0   • oxyCODONE (ROXICODONE) 15 MG immediate release tablet Take 1 tablet by mouth Every 4 (Four) Hours As Needed for Moderate Pain. 120 tablet 0   • potassium chloride (MICRO-K) 10 MEQ CR capsule Take 2 capsules by mouth 2 (Two) Times a Day. 60 capsule 1   • rizatriptan (MAXALT) 10 MG tablet Take 1 tablet by mouth 1 (One) Time for 1 dose. AT ONSET OF HEADACHE MAY REPEAT ONE TABLET IN 2 HOURS IF NEEDED 12 tablet 5   • sulfamethoxazole-trimethoprim (Bactrim DS) 800-160 MG per tablet Take 1 tablet by mouth 2 (Two) Times a Day. 14 tablet 0     No current facility-administered medications on file prior to visit.            Which medication are you concerned about: DULoxetine (CYMBALTA) 60 MG capsule ()    Who prescribed you this medication: DR DASHA SANDOVAL    What are your concerns: PATIENTS  STATED THAT SINCE STARTING  THIS MEDICATION, THE PATIENT HAS BEEN TWITCHING A LOT IN HER SLEEP, AND HAVING CONVERSATIONS WITH HERSELF IN HER SLEEP.    PATIENTS  IS REQUESTING TO KNOW IF THE PATIENT MAY BE TAKEN OFF OF THIS MEDICATION, OR IF a lower DOSE MAY BE SENT IN TO TAPER THE PATIENT OFF OF THIS MEDICATION.    PATIENTS  STATED THE PHARMACY PREFERRED WOULD BE [facility] [id number] - [place], [place] - [address] - [phone]  - [phone] FX    PLEASE CALL THE PATIENTS  TO DISCUSS FURTHER IF NEEDED.

## 2022-12-27 NOTE — DISCHARGE INSTRUCTIONS
[facility] - Post-procedure Instructions          --  While there are no absolute restrictions, it is recommended that you do not perform strenuous activity today. In the morning, you may resume your level of activity as before your block.    --  If you have a band-aid at your injection site, please remove it later today. Observe the area for any redness, swelling, pus-like drainage, or a temperature over 101°. If any of these symptoms occur, please call your doctor at [phone]. If after office hours, leave a message and the on-call provider will return your call.    --  Ice may be applied to your injection site. It is recommended you avoid direct heat (heating pad; hot tub) for 1-2 days.    --  Call [facility] at [phone] if you experience persistent headache, persistent bleeding from the injection site, or severe pain not relieved by heat or oral medication.    --  Do not make important decisions today.    --  Due to the effects of the block and/or the I.V. Sedation, DO NOT drive or operate hazardous machinery for 12 hours.  Local anesthetics may cause numbness after procedure and precautions must be taken with regards to operating equipment as well as with walking, even if ambulating with assistance of another person or with an assistive device.    --  Do not drink alcohol for 12 hours.    -- You may return to work tomorrow, or as directed by your referring doctor.    --  Occasionally you may notice a slight increase in your pain after the procedure. This should start to improve within the next 24-48 hours. Radiofrequency ablation procedure pain may last 3-4 weeks.    --  It may take as long as 3-4 days before you notice a gradual improvement in your pain and/or other symptoms.    -- You may continue to take your prescribed pain medication as needed.    --  Some normal possible side effects of steroid use could include fluid retention, increased blood sugar, dull headache,  increased sweating, increased appetite, mood swings and flushing.    --  Diabetics are recommended to watch their blood glucose level closely for 24-48 hours after the injection.    --  Must stay in PACU for 20 min upon arrival and prove no leg weakness before being discharged.    --  IN THE EVENT OF A LIFE THREATENING EMERGENCY, (CHEST PAIN, BREATHING DIFFICULTIES, PARALYSIS…) YOU SHOULD GO TO YOUR NEAREST EMERGENCY ROOM.    --  You should be contacted by our office within 2-3 days to schedule follow up or next appointment date.  If not contacted within 7 days, please call the office at [phone]

## 2022-12-28 ENCOUNTER — HOSPITAL ENCOUNTER (OUTPATIENT)
Dept: GENERAL RADIOLOGY | Facility: SURGERY CENTER | Age: 57
Setting detail: HOSPITAL OUTPATIENT SURGERY
End: 2022-12-28
Payer: COMMERCIAL

## 2022-12-28 ENCOUNTER — HOSPITAL ENCOUNTER (OUTPATIENT)
Facility: SURGERY CENTER | Age: 57
Setting detail: HOSPITAL OUTPATIENT SURGERY
Discharge: HOME | End: 2022-12-28
Attending: ANESTHESIOLOGY | Admitting: ANESTHESIOLOGY
Payer: COMMERCIAL

## 2022-12-28 VITALS
HEART RATE: 101 BPM | WEIGHT: 127 LBS | SYSTOLIC BLOOD PRESSURE: 130 MMHG | BODY MASS INDEX: 22.5 KG/M2 | DIASTOLIC BLOOD PRESSURE: 105 MMHG | OXYGEN SATURATION: 97 % | HEIGHT: 63 IN | TEMPERATURE: 97 F | RESPIRATION RATE: 16 BRPM

## 2022-12-28 DIAGNOSIS — M54.16 LUMBAR RADICULOPATHY: ICD-10-CM

## 2022-12-28 DIAGNOSIS — M48.061 LUMBAR FORAMINAL STENOSIS: ICD-10-CM

## 2022-12-28 DIAGNOSIS — Z41.9 SURGERY, ELECTIVE: ICD-10-CM

## 2022-12-28 PROCEDURE — 64483 NJX AA&/STRD TFRM EPI L/S 1: CPT | Performed by: ANESTHESIOLOGY

## 2022-12-28 PROCEDURE — 77002 NEEDLE LOCALIZATION BY XRAY: CPT

## 2022-12-28 PROCEDURE — 25010000002 IOPAMIDOL 61 % SOLUTION 30 ML VIAL: Performed by: ANESTHESIOLOGY

## 2022-12-28 PROCEDURE — 76000 FLUOROSCOPY <1 HR PHYS/QHP: CPT

## 2022-12-28 PROCEDURE — 25010000002 DEXAMETHASONE PER 1 MG: Performed by: ANESTHESIOLOGY

## 2022-12-28 RX ORDER — SODIUM CHLORIDE 0.9 % (FLUSH) 0.9 %
10 SYRINGE (ML) INJECTION EVERY 12 HOURS SCHEDULED
Status: DISCONTINUED | OUTPATIENT
Start: 2022-12-28 | End: 2022-12-28 | Stop reason: HOSPADM

## 2022-12-28 RX ORDER — SODIUM CHLORIDE 0.9 % (FLUSH) 0.9 %
10 SYRINGE (ML) INJECTION AS NEEDED
Status: DISCONTINUED | OUTPATIENT
Start: 2022-12-28 | End: 2022-12-28 | Stop reason: HOSPADM

## 2022-12-28 NOTE — OP NOTE
Lumbar Transforaminal Epidural Steroid Injection Bilateral L5-S1 Levels  [facility]    PREOPERATIVE DIAGNOSIS:  Lumbar radicular pain, Lumbar Radiculopathy and Lumbar foraminal stenosis    POSTOPERATIVE DIAGNOSIS:  Same as preop diagnosis    PROCEDURE:    1. CPT 14213 --  Diagnostic & Therapeutic Transforaminal Epidural Steroid Injection at the L5 level, on the bilateral     PRE-PROCEDURE DISCUSSION WITH PATIENT:    Risks and complications were discussed with the patient prior to starting the procedure and informed consent was obtained.  We discussed various topics including but not limited to bleeding, infection, injury, nerve injury, paralysis, coma, death, postprocedural painful flare-up, postprocedural site soreness, and a lack of pain relief.  We discussed the diagnostic aspect of transforaminal epidural / selective nerve root blockade.    SURGEON:  Breann Santiago MD    SEDATION:  Patient declined administration of moderate sedation    ANESTHETIC:  0.5% bupivacaine  STEROID:  15mg dexamethasone    DESCRIPTON OF PROCEDURE:  After obtaining informed consent, an I.V. was not started in the preoperative area. The patient taken to the operating room and was placed in the prone position with a pillow under the abdomen.  All pressure points were well padded.  EKG, blood pressure, and pulse oximeter were monitored.  The lumbar area was prepped with Chloraprep and draped in a sterile fashion.     The AP fluoroscopic image was used to visualize the L5 vertebral body.  The superior endplate was squared off radiographically.  The image was then obliqued towards the patient's left side to maximize visualization of the pedicle. The skin and subcutaneous tissue at the 6 o'clock position of the pedicle was anesthetized with 1% lidocaine. A 22-gauge spinal needle was then advanced percutaneously through the anesthetized skin tract under fluoroscopic guidance in AP and lateral views until the needle tip lie  in the nelia-lateral aspect of the epidural space.  After negative aspiration of the needle for blood or CSF, a volume of 1 mL of Isovue was injected producing good epidural spread with no evidence of loculation, vascular run-off, or intrathecal spread. Subsequently, a volume of 3 mL of injectate was administered without resistance.  The needle was removed intact.  Vital signs were stable throughout.      The same procedure was then performed on the contralateral side in the exact same fashion.      The total volume injected consisted of 15 mg of dexamethasone with 1 mL of 0.5% bupivacaine and preservative-free normal saline.       ESTIMATED BLOOD LOSS:  <5 mL  SPECIMENS:  none    COMPLICATIONS:   No complications were noted., There was no indication of vascular uptake on live injection of contrast dye. and There was no indication of intrathecal uptake on live injection of contrast dye.    TOLERANCE & DISCHARGE CONDITION:    The patient tolerated the procedure well.  The patient was transported to the recovery area without difficulties.  The patient was discharged to home under the care of family in stable and satisfactory condition.    PLAN OF CARE:  1. The patient was given our standard instruction sheet.  2. The patient will Return to clinic 4-6 wks.  3. The patient will resume all medications as per the medication reconciliation sheet.

## 2022-12-30 NOTE — TELEPHONE ENCOUNTER
Caller: BK CARPENTER    Relationship: Emergency Contact    Best call back number:[phone]    Do you require a callback: YES-PATIENT'S  WANTS TO KNOW IF IT IS SAFE TO STOP BUPROPION OR IF PATIENT NEEDS TO BE WEANED DOWN. PLEASE CALL AND ADVISE.

## 2022-12-30 NOTE — TELEPHONE ENCOUNTER
Called patient's  to discuss his concerns. He states her anxiety is well-controlled, but he would like to try weaning her off of medications due to her changes in mental status.  We discussed bupropion 150 mg XL can be taken every other day for 2 weeks, then discontinued. If she has adverse side effects like nausea, anxiety, trouble sleeping, or sweating, we can try going to an IR version to help wean.  Patient's  verbalized understanding.  He is aware to call for any adverse side effects with weaning off of this medicine.

## 2023-01-01 ENCOUNTER — TELEPHONE (OUTPATIENT)
Dept: ONCOLOGY | Facility: CLINIC | Age: 58
End: 2023-01-01

## 2023-01-01 ENCOUNTER — TELEPHONE (OUTPATIENT)
Dept: PAIN MEDICINE | Facility: CLINIC | Age: 58
End: 2023-01-01

## 2023-01-01 ENCOUNTER — TELEPHONE (OUTPATIENT)
Dept: FAMILY MEDICINE CLINIC | Facility: CLINIC | Age: 58
End: 2023-01-01

## 2023-01-04 ENCOUNTER — INFUSION (OUTPATIENT)
Dept: ONCOLOGY | Facility: HOSPITAL | Age: 58
End: 2023-01-04
Payer: COMMERCIAL

## 2023-01-04 ENCOUNTER — OFFICE VISIT (OUTPATIENT)
Dept: ONCOLOGY | Facility: CLINIC | Age: 58
End: 2023-01-04
Payer: COMMERCIAL

## 2023-01-04 VITALS
BODY MASS INDEX: 22.5 KG/M2 | TEMPERATURE: 97.1 F | RESPIRATION RATE: 18 BRPM | HEIGHT: 63 IN | SYSTOLIC BLOOD PRESSURE: 101 MMHG | HEART RATE: 121 BPM | DIASTOLIC BLOOD PRESSURE: 81 MMHG | OXYGEN SATURATION: 97 %

## 2023-01-04 DIAGNOSIS — C50.212 MALIGNANT NEOPLASM OF UPPER-INNER QUADRANT OF LEFT BREAST IN FEMALE, ESTROGEN RECEPTOR POSITIVE: ICD-10-CM

## 2023-01-04 DIAGNOSIS — Z45.2 ENCOUNTER FOR FITTING AND ADJUSTMENT OF VASCULAR CATHETER: ICD-10-CM

## 2023-01-04 DIAGNOSIS — Z17.0 MALIGNANT NEOPLASM OF UPPER-INNER QUADRANT OF LEFT BREAST IN FEMALE, ESTROGEN RECEPTOR POSITIVE: ICD-10-CM

## 2023-01-04 DIAGNOSIS — C34.90 NON-SMALL CELL LUNG CANCER METASTATIC TO BONE: Primary | ICD-10-CM

## 2023-01-04 DIAGNOSIS — Z45.2 ENCOUNTER FOR FITTING AND ADJUSTMENT OF VASCULAR CATHETER: Primary | ICD-10-CM

## 2023-01-04 DIAGNOSIS — C79.51 NON-SMALL CELL LUNG CANCER METASTATIC TO BONE: Primary | ICD-10-CM

## 2023-01-04 LAB
ALBUMIN SERPL-MCNC: 2.8 G/DL (ref 3.5–5.2)
ALBUMIN/GLOB SERPL: 0.9 G/DL (ref 1.1–2.4)
ALP SERPL-CCNC: 145 U/L (ref 38–116)
ALT SERPL W P-5'-P-CCNC: 13 U/L (ref 0–33)
ANION GAP SERPL CALCULATED.3IONS-SCNC: 13.7 MMOL/L (ref 5–15)
AST SERPL-CCNC: 27 U/L (ref 0–32)
BASOPHILS # BLD AUTO: 0.07 10*3/MM3 (ref 0–0.2)
BASOPHILS NFR BLD AUTO: 0.6 % (ref 0–1.5)
BILIRUB SERPL-MCNC: 0.2 MG/DL (ref 0.2–1.2)
BUN SERPL-MCNC: 5 MG/DL (ref 6–20)
BUN/CREAT SERPL: 5.3 (ref 7.3–30)
CALCIUM SPEC-SCNC: 8 MG/DL (ref 8.5–10.2)
CHLORIDE SERPL-SCNC: 109 MMOL/L (ref 98–107)
CO2 SERPL-SCNC: 16.3 MMOL/L (ref 22–29)
CREAT SERPL-MCNC: 0.94 MG/DL (ref 0.6–1.1)
DEPRECATED RDW RBC AUTO: 51.2 FL (ref 37–54)
EGFRCR SERPLBLD CKD-EPI 2021: 70 ML/MIN/1.73
EOSINOPHIL # BLD AUTO: 0.12 10*3/MM3 (ref 0–0.4)
EOSINOPHIL NFR BLD AUTO: 1.1 % (ref 0.3–6.2)
ERYTHROCYTE [DISTWIDTH] IN BLOOD BY AUTOMATED COUNT: 14.6 % (ref 12.3–15.4)
GLOBULIN UR ELPH-MCNC: 3 GM/DL (ref 1.8–3.5)
GLUCOSE SERPL-MCNC: 128 MG/DL (ref 74–124)
HCT VFR BLD AUTO: 42 % (ref 34–46.6)
HGB BLD-MCNC: 13 G/DL (ref 12–15.9)
IMM GRANULOCYTES # BLD AUTO: 0.04 10*3/MM3 (ref 0–0.05)
IMM GRANULOCYTES NFR BLD AUTO: 0.4 % (ref 0–0.5)
LYMPHOCYTES # BLD AUTO: 0.99 10*3/MM3 (ref 0.7–3.1)
LYMPHOCYTES NFR BLD AUTO: 9.1 % (ref 19.6–45.3)
MCH RBC QN AUTO: 29.5 PG (ref 26.6–33)
MCHC RBC AUTO-ENTMCNC: 31 G/DL (ref 31.5–35.7)
MCV RBC AUTO: 95.2 FL (ref 79–97)
MONOCYTES # BLD AUTO: 0.88 10*3/MM3 (ref 0.1–0.9)
MONOCYTES NFR BLD AUTO: 8.1 % (ref 5–12)
NEUTROPHILS NFR BLD AUTO: 8.81 10*3/MM3 (ref 1.7–7)
NEUTROPHILS NFR BLD AUTO: 80.7 % (ref 42.7–76)
NRBC BLD AUTO-RTO: 0 /100 WBC (ref 0–0.2)
PLATELET # BLD AUTO: 348 10*3/MM3 (ref 140–450)
PMV BLD AUTO: 9.6 FL (ref 6–12)
POTASSIUM SERPL-SCNC: 5 MMOL/L (ref 3.5–4.7)
PROT SERPL-MCNC: 5.8 G/DL (ref 6.3–8)
RBC # BLD AUTO: 4.41 10*6/MM3 (ref 3.77–5.28)
SODIUM SERPL-SCNC: 139 MMOL/L (ref 134–145)
WBC NRBC COR # BLD: 10.91 10*3/MM3 (ref 3.4–10.8)

## 2023-01-04 PROCEDURE — 36591 DRAW BLOOD OFF VENOUS DEVICE: CPT

## 2023-01-04 PROCEDURE — 85025 COMPLETE CBC W/AUTO DIFF WBC: CPT

## 2023-01-04 PROCEDURE — 1160F RVW MEDS BY RX/DR IN RCRD: CPT | Performed by: INTERNAL MEDICINE

## 2023-01-04 PROCEDURE — 1125F AMNT PAIN NOTED PAIN PRSNT: CPT | Performed by: INTERNAL MEDICINE

## 2023-01-04 PROCEDURE — 80053 COMPREHEN METABOLIC PANEL: CPT

## 2023-01-04 PROCEDURE — 96523 IRRIG DRUG DELIVERY DEVICE: CPT

## 2023-01-04 PROCEDURE — 25010000002 HEPARIN LOCK FLUSH PER 10 UNITS: Performed by: INTERNAL MEDICINE

## 2023-01-04 PROCEDURE — 96360 HYDRATION IV INFUSION INIT: CPT

## 2023-01-04 PROCEDURE — 1159F MED LIST DOCD IN RCRD: CPT | Performed by: INTERNAL MEDICINE

## 2023-01-04 PROCEDURE — 99214 OFFICE O/P EST MOD 30 MIN: CPT | Performed by: INTERNAL MEDICINE

## 2023-01-04 RX ORDER — SODIUM CHLORIDE 0.9 % (FLUSH) 0.9 %
10 SYRINGE (ML) INJECTION AS NEEDED
Status: DISCONTINUED | OUTPATIENT
Start: 2023-01-04 | End: 2023-01-04 | Stop reason: HOSPADM

## 2023-01-04 RX ORDER — HEPARIN SODIUM (PORCINE) LOCK FLUSH IV SOLN 100 UNIT/ML 100 UNIT/ML
500 SOLUTION INTRAVENOUS AS NEEDED
Status: DISCONTINUED | OUTPATIENT
Start: 2023-01-04 | End: 2023-01-04 | Stop reason: HOSPADM

## 2023-01-04 RX ORDER — HEPARIN SODIUM (PORCINE) LOCK FLUSH IV SOLN 100 UNIT/ML 100 UNIT/ML
500 SOLUTION INTRAVENOUS AS NEEDED
Status: CANCELLED | OUTPATIENT
Start: 2023-01-04

## 2023-01-04 RX ORDER — SODIUM CHLORIDE 0.9 % (FLUSH) 0.9 %
10 SYRINGE (ML) INJECTION AS NEEDED
Status: CANCELLED | OUTPATIENT
Start: 2023-01-04

## 2023-01-04 RX ORDER — ONDANSETRON HYDROCHLORIDE 8 MG/1
8 TABLET, FILM COATED ORAL EVERY 8 HOURS PRN
Qty: 90 TABLET | Refills: 3 | Status: SHIPPED | OUTPATIENT
Start: 2023-01-04 | End: 2023-01-05

## 2023-01-04 RX ADMIN — SODIUM CHLORIDE 1000 ML: 9 INJECTION, SOLUTION INTRAVENOUS at 12:08

## 2023-01-04 RX ADMIN — Medication 10 ML: at 11:15

## 2023-01-04 RX ADMIN — Medication 500 UNITS: at 11:15

## 2023-01-04 NOTE — PROGRESS NOTES
[facility] GROUP INPATIENT PROGRESS NOTE    Length of Stay:  0 days    CHIEF COMPLAINT/REASON FOR VISIT:  Metastatic non-small cell lung cancer  Bilateral breast cancer    Interval history  Ms. Patel presents to the clinic today for follow-up.  She is accompanied by Jelani her .  She is complaining of abdominal pain and right lower extremity discomfort.  She is also developed some nausea and vomiting for the past 1 to 2 weeks.  Prior to that she had a reasonable appetite.  Her mental status has improved significantly and she is oriented to time place and person.  Her  has been working with her primary care physician to wean her off the Wellbutrin and she is currently on Cymbalta 30 mg.  She continues on oxycodone for pain control and reports adequate pain control.    Oncologic history  Ms. 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     3/1/2021-bilateral screening mammogram-microcalcifications seen in the posterior one third of the lateral aspect of the right breast.  Area of focal asymmetry seen in the middle one third of the upper inner quadrant of the left breast.     3/1/2021-DEXA scan-T score of -2.2 on the lumbar spine and T score of -2.3 in the left hip and T score of -1.9 in the right hip.  Findings consistent with osteopenia     3/23/2021-screening lung CT-there is a 10 x 11 mm solid nodule in the left upper lobe.  Enlarged AP window lymph node measuring 14 x 10 mm.  Suggestion of a possible 9 mm left hilar lymph node.  Heavy coronary artery calcification.     3/23/2021-diagnostic mammogram bilateral-cluster of microcalcifications in the middle third lateral aspect of the right breast.  Left breast demonstrates persistence of the area of focal asymmetry in the region.     Ultrasound-left breast ultrasound at 10 o'clock position, 6 cm from the nipple there is a 0.4 cm irregular hypoechoic  lesion.  Stereotactic biopsy of the right breast calcifications recommended.  Ultrasound-guided biopsy of the left breast lesion recommended     4/7/2021-right breast stereotactic biopsy and left breast ultrasound-guided biopsy  Pathology  Right breast-invasive ductal carcinoma, grade 2, lymphovascular invasion present, ER +99% strong, TX +80% moderate, HER-2 negative, Ki-67 12%  Left breast upper inner quadrant 10 o'clock position biopsy, invasive ductal carcinoma, grade   2, ER +99% strong, TX +40% strong, HER-2 2+ on immunohistochemistry, nonamplified on FISH Ki-67 10%     4/14/2021-PET/CT-FDG avid aortopulmonary window lymph node measures 0.9 cm with an SUV of 7.5.  FDG avid left hilar lymph node measures 1 cm with an SUV of 17.2.  Irregular soft tissue density in the right breast measuring 3.7 x 3.8 cm is favored to be secondary to the recent breast biopsy.  1.1 cm pulmonary nodule within the left upper lobe with SUV 9.7 .  Sub-6 mm pulmonary nodules are present in the right lower lobe.  No evidence of lymphadenopathy or metastatic disease in the abdomen.  Focal area of FDG uptake within the central canal posterior to T11-T12 displaced demonstrating an SUV of 5.5 thought to be reactive however MRI is recommended for further evaluation.     She was seen by Dr. Molina who initially planned for breast surgery however  due to the PET abnormalities she has been referred to Dr. Kerr who plans to do a wound on 4/30/2021.  Dr. Molina's and Dr. Krer's notes reviewed.     Patient denies any family history of breast cancer.  Her mother had lymphoma.  Maternal grandmother had colon cancer.  Prior to that screening mammogram she did not have any palpable abnormalities of either breast.     She has been a heavy smoker for about 40 years and smoked 2 packs/day.  Denies any recent weight changes, new bone pains, cough, abdominal pain nausea vomiting constipation or diarrhea.  She does have anxiety and has been on several  medications.  She has recently been started on Xanax to help with the mood and also with insomnia.     Patient had a video-assisted thoracoscopy and mediastinal lymphadenectomy on 4/30/2021.  L5-L6 lymph nodes were biopsied however the small pulmonary nodule in the left upper lobe was not difficult to find.  Pathology showed moderately differentiated adenocarcinoma which is CK7 and TTF-1 positive.  Consistent with lung primary.  Ki-67 85%.     5/14/2021-MRI of the brain-minimal chronic small vessel ischemic change in the white matter.  Otherwise negative MRI.     5/20/2021-bilateral axillary ultrasound-no evidence of axillary lymphadenopathy in either axilla.  Normal-appearing bilateral axillary lymph nodes visualized.     Cycle 1 cisplatin and Alimta on 5/25/2021.     Cycle 2 cisplatin Alimta 6/15/2021     Cycle 3 cisplatin Alimta 7/7/2021     Completed radiation on 7/12/2021     Port was nonfunctional hence a port study was performed which showed that the port was backed up into the innominate vein and also there was a nonocclusive thrombus at the end of the catheter extending into the superior vena cava.  She was started on anticoagulation with Eliquis.  It was recommended for port revision of the intention to keep the port.     PET/CT 8/5/2021-images independently reviewed and interpreted by me-decrease in size and FDG uptake of the left upper lobe pulmonary nodule as well as mediastinal and left hilar lymphadenopathy representing response to treatment.  Sub-6 mm pulmonary nodule in the left upper lobe new since 4/14/2021.  This could be related to radiation however follow-up CT in 3 months recommended.  Decrease in size of the irregular masslike tissue in the right breast which is postbiopsy hematoma.  This is not PET avid.  New segmental left eighth rib fractures healing.  A new band of sclerosis of the left seventh rib which favored to represent healing nondisplaced fracture.  Follow-up CT recommended.  focal  uptake in the duodenum first and second portions.  Could be related to duodenitis.  Indeterminate lesion in the L1 vertebral body not well evaluated on PET/CT.     10/1/2021-MRI of the lumbar spine.  Lesion in the left posterior body of L1 measures 14 x 14 x 12 mm and previously 5 x 5 x 6 mm.  The interval enlargement strongly suggest that it is metastatic lesion.  No additional osseous metastasis noted.   Other chronic changes noted.     10/14/2021-biopsy of the lumbar spine lesion-pathology consistent with poorly differentiated carcinoma.  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     10/26/2021-brain MRI-no evidence of metastatic disease.     10/26/2021-bilateral diagnostic mammogram and ultrasound  Scattered fibroglandular density in both breast.  Postbiopsy hematoma with internal biopsy clip in the upper outer quadrant of the right breast, 8 cm from the nipple.  The hematoma size is decreased to 2.9 cm from 3.6 cm earlier.     Left breast-parenchymal density measuring 9 x 10 mm has markedly decreased.  Few adjacent calcifications which are unchanged.  No new abnormality in the left breast.  At 10:00 region there is some minimal adjacent hypoechoic texture which is difficult to measure but appears smaller since the previous exam.     10/28/2021-PET/CT  New L1 and L2 lytic bone metastasis annually intensely hypermetabolic focus at the left adrenal gland likely represents metastatic disease as well.  Slight decrease in size of the 0.9 x 0.7 cm left upper lobe pulmonary nodule.  There is hypermetabolic activity related to radiation pneumonitis.  The remainder of the nodule is photopenic.  Uncertain etiology of the more intense activity along the right lateral peripheral margin of the 2.6 cm postbiopsy density of the right breast.     She completed radiation to to the lumbar spine on 11/19/2021 11/22/2021-cycle 1 Alimta and  Keytruda     3/23/2022-bilateral diagnostic mammogram and ultrasound  Complete resolution of the microcalcifications in the upper outer quadrant of the right breast and decrease in size of the postbiopsy hematoma.  No suspicious abnormalities in the right breast.  Benign-appearing calcifications at the site of malignancy in the left breast upper inner quadrant.  No other suspicious findings.     4/11/2022 PET/CT-there is new groundglass opacities in the left upper lobe which are most likely postradiation changes.  Other groundglass opacities in the left lung as well as the right lung remained stable.  Increased nodularity of the left adrenal gland with an SUV of 5.6, previously 3.5.    Increased uptake in the L1 vertebra in the right posterior aspect with an SUV of 3.6.  Left L1 sclerosis increased     Due to this the case was discussed in multidisciplinary conference.  The disease progression was significant in the left adrenal gland as well as the L1 vertebra.  Since there were only 2 areas of disease progression it has been decided to proceed with radiation to these 2 spots and continue on Keytruda and Alimta.     Patient also has had worsening of her kidney function.  We initially held treatment as of 5/31/2022 and creatinine bumped up to 2.0, BUN 23.  We then resumed therapy 6/11/2022 with Keytruda alone.  Patient was referred to nephrology.     6/6/2022-MRI of the spine-diffuse marrow replacement in the L1 vertebral body and inferior endplate concavity.  Suspicious for metastatic disease with pathological fracture in the inferior endplate.  Also diffuse signal abnormality in the L2 lamina on the left suspicious for additional metastatic disease.  Abnormality of the first sacral segment suspicious for metastatic disease.  Left adrenal gland appears enlarged.  30 to 40% height loss and L2 vertebral body suggestive of a subacute compression fracture.  Degenerative changes.     7/18/2022-PET/CT  Multiple  hypermetabolic osseous lesions with index lesions which have either increased in degree of FDG uptake or newly hypermetabolic lesions representative of metastatic disease and progression of disease.  Possible FDG avid lesion in the left femoral diaphysis however these are incompletely visualized and in the area of artifactual FDG uptake.  MRI of the left femur recommended for further evaluation.  Increasing FDG uptake of the left adrenal gland.  Focal left hilar hypermetabolic him corresponds to the lymph node which has minimally increased in size compared to April 2022.  New sub-6 mm pulmonary nodules.     7/26/2022-cycle 1 of Taxotere     Patient was admitted to the hospital 8/3/2021 discharged on 8/9/2022.  She was admitted for strokelike symptoms and confusion.  The confusion was thought to be secondary to polypharmacy and probably febrile neutropenia.  She was treated with antibiotics.  Mental status improved subsequently.  MRI of the cervical thoracic and lumbar spine was performed on 8/4/2022.  Images independently reviewed by me.  Multiple metastatic lesions in the spine noted.  There was a lesion on the CT which was concerning.  There is also a sacral Lesion measuring up to 5 cm in transverse dimension on the right.  Patient was symptomatic with pain on the right side of the sacrum.  Radiation oncology recommended radiation to the cervical spine as well as sacrum.  CT head without any obvious abnormalities.     Completed radiation to the cervical spine and sacrum on 8/12/2022    She was admitted to the hospital between 9/23/2022 to 10/12/2022.  She was admitted for poorly controlled pain as well as encephalopathy and multiple falls.  She was diagnosed with a UTI and treated with IV Rocephin.  Pain medications changed and also additional radiation to the right sacrum was performed.  3000 cGy in 10 fractions was administered.  She was discharged on oxycodone 15 mg every 4 hours as needed for pain and  OxyContin twice daily.  She was recommended to go to a subacute rehab however she preferred to be discharged home with home physical therapy and Occupational Therapy    9/24/2022-MRI of the pelvis showed osseous metastatic disease in the sacrum worst within the right and probable pathological fractures bilaterally.    10/4/2022 CT head without any evidence of metastatic disease or acute abnormalities.    10/25/2022-PET/CT-resolution of hypermetabolic activity in the upper lobe and left hilum.  Previously noted new tiny pulmonary nodules have resolved.  Resolution of hypermetabolic activity in the left adrenal gland.  Near complete resolution of hypermetabolic activity in the skeletal metastasis particularly the cervical spine, pelvic bones and proximal left femur.  Maximal SUV at the right sacral metastasis is 2.7 and previously it was 8.9.  Tiny focus of hypermetabolic activity at the GE junction with a maximal SUV of 4.7.  No definite thickening or abnormality noted on the CT.    11/15/2022-lumbar puncture with negative cytology of the CSF.    11/21/2022-MRI of the brain with contrast and MRI of the cervical spine with contrast without any evidence of metastatic disease.    PHYSICAL EXAMINATION:    General: Oriented to person, place, and time.  Ill-appearing.  Pale.  HEENT: Periorbital edema has resolved.  Chest/Lungs: Clear to auscultation bilaterally. Right chest mediport in place  Heart: RRR  Abdomen/GI: soft, NT, ND, NABS  Extremities: Bilateral lower extremity without any swelling.  Decreased strength in both lower extremities.  No focal deficits.    I have reexamined the patient and the results are consistent with the previously documented exam. Salma Snell MD     DIAGNOSTIC DATA:  Results Review:     I reviewed the patient's new clinical results.        Lab Results   Component Value Date    NEUTROABS 8.49 (H) 12/09/2022                     IMAGING:      PET/CT 10/25/2022-images independently reviewed  and interpreted by me in detail summarized above    ASSESSMENT:  This is a 57 y.o. female with:     *Metastatic non-small cell lung cancer  · She was initially diagnosed with stage III (T1N2M0) disease.    · She received initial therapy with cisplatin and Alimta concurrent with radiation therapy beginning 5/25/2021.    · There were indeterminate findings in the lumbar spine at L1 and it was recommended to monitor this over time.    · Patient completed 3 cycles cisplatin and Alimta 7/7/2021 and completed radiation therapy 7/12/2021.    · PET scan 8/6/2021 showed good response to treatment.    · MRI lumbar spine 10/1/2021 with increase in size of the L1 lesion.    · Biopsy of the L1 lesion on 10/14/2022 confirmed metastatic adenocarcinoma of lung origin, PD-L1 TPS 0.    · PET scan 10/26/2021 with involvement of left adrenal and L1/L2 only.    · Patient received radiation therapy to L1/L2 on 11/19/2021.  She also received radiation to the left adrenal gland.    · She initiated further systemic therapy on 11/22/2021 with Keytruda and Alimta.   · Follow-up PET scan 1/18/2022 with decrease in small left upper lobe pulmonary nodule, resolution of activity at L1/L2, no new sites of disease.    · Guardant 360 CT DNA level was monitored and was decreasing.    · PET scan 4/11/2022 with progression in left adrenal and L1.    · Maintained systemic therapy with Keytruda and Alimta and received additional radiation to left adrenal and L1.  Alimta however held since 5/10/2022 due to renal dysfunction.  Radiation completed to lumbar spine 7/14/2022.    · PET scan 7/18/2022 with multiple areas of progression of the spine and right sacrum.    · Guardant 360 analysis 7/20/2022 with no actionable mutations.    · Change in therapy to single agent palliative Taxotere initiated 7/26/2022.    · Altered mental status and febrile neutropenia requiring hospitalization 8/2 - 8/9/2022.  During that admission, MRI brain, cervical, thoracic,  lumbar spine performed 8/4/2022 in addition to CT cervical spine 8/5/2022.  There was evidence of C2 metastatic lesion with some dural enhancement, compression deformity at T7 with mild edema suggesting acute to subacute fracture, 5 mm T12 spinous process metastasis, multifocal disease in the lumbar spine and sacrum, largest involving sacral ala to the right 5 cm in transverse dimension.  Loss of height at L2 25%.  · Patient received cycle 3 Taxotere 9/6/2022 with Neulasta support.    · She has been continuing as well on Xgeva every 4 weeks (last received 9/6/2022).  · Patient did undergo MRI lumbar spine and pelvis on 9/24/2022.  MRI pelvis showed bilateral sacral fractures with marrow infiltration related to metastasis more prominent on the right.  Lesion on the right 3.5 x 4.1 x 4.3 cm.  Also probable metastasis along posterior acetabulum on the right 1.5 cm.  MRI lumbar spine with stable L1 metastasis, new edema endplate T12 possibly stress reaction versus developing new metastasis, lesion at T12 spinous process unchanged.   · Radiation oncology consulted with plans for palliative treatment of the right sacral metastasis.  Treatment initiated 9/28/2022  · Completed radiation to the right sacral metastasis on 10/11/2022.  · PET/CT 10/25/2022 without any metabolic uptake in the lung or the skeletal metastasis.  This is indicative of a positive response to Taxotere.  · Pain persistent sitting and walking position and not present when she is laying down.  · Patient is still very active and performance status is very poor.  Due to this reason chemotherapy will be continued to be held.  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  · Due to this reason an MRI of the brain was obtained but unfortunately this was performed without contrast.  Reviewed the MRI and no evidence of metastatic disease.  · 11/15/2022 lumbar puncture shows no evidence of  malignancy in the CSF, elevated protein at 65.2, glucose normal at 58, culture no growth in 3 days  · Repeat MRI of the brain with contrast 11/21/2022 does not show any obvious abnormalities.  There is no enhancement of the CSF.  MRI of the cervical spine with no abnormalities.  · Patient unable to undergo MRI of the thoracic and lumbar spine due to mental status and unable to being in 1 position as well as with incontinence.  · 1/4/2023-mental status has improved significantly.  Patient reports decreased oral intake and nausea and vomiting.  She is also reporting abdominal discomfort.  · We will obtain a PET/CT to evaluate the disease status.     *History of bilateral stage I breast cancer  · Patient with bilateral breast cancer, both stage I ([id number]), grade 2, ER/[place] positive and HER2/melinda negative with low Ki-67.    · Patient initiated adjuvant letrozole in May 2021.  · Continue letrozole    *Mental status changes  · Improved significantly.  · Thought to be secondary to medications.  · She is now off long-acting narcotics and also has been taking lower dose of Cymbalta and weaning the Wellbutrin.     *Cancer related pain  · Patient has been receiving Duragesic patch 50 mcg every 72 hours in addition to oxycodone 15 mg every 4 hours for breakthrough pain.  Duragesic dose had been escalated recently in the outpatient setting due to worsening pain  · On admission 9/23/2022, Duragesic patch increased to 75 mcg daily, added Decadron 4 mg IV every 6 hours with continuation of oxycodone 15 mg every 4 hours as needed for breakthrough pain in addition of Dilaudid 1 mg every 2 hours as needed for breakthrough pain.  · Patient with increasing side effects from narcotics, Duragesic decreased on 9/25/2022 from 75 down to 50 mcg patch every 72 hours.  · Dexamethasone dose decreased to 4 mg p.o. every 12 hours on 9/27/2022  · Initiated palliative radiation to the right sacrum on 9/28/2022  · Patient became confused after  receiving Dilaudid.  Patient and family asked to discontinue Dilaudid.  · Duragesic patch dose was increased to 75 mcg/h on 9/30/2022. Subsequent decrease to 50 mcg due to confusion  · Gabapentin was discontinued due to the sedating effect.  · Oxycodone is being used for breakthrough pain.  · 10/3 transition to sustained release oxycodone 40 mg bid and fentanyl patch stopped  · Poorly controlled, pain particularly worse in sitting and standing position.  · Unsure if extends is contributing to mental status changes as previously patient had been very sensitive to pain meds.  · Due to this reason we will discontinue the Xtampza ER and switch to OxyContin 20 mg twice daily.  · She continues to experience severe mental status changes.  · Long-acting pain medications discontinued due to mental status changes and she is currently on oxycodone 15 mg as needed.  · Reports better pain control after the epidural injection of the spine.      *Anxiety/depression  · She is currently only on Cymbalta 30 mg and Wellbutrin every other day with attempts to wean and discontinue subsequently     *Insomnia  · She has been receiving Xanax as needed     *Anemia  · Most recent evaluation 8/3/2022 with iron 15, ferritin 276, iron saturation 5%, TIBC 328, folate greater than 20, B12 392  · Anemia felt to be related to malignancy/chronic disease and chemotherapy  · Hemoglobin 13.9, likely secondary to dehydration    *Thrombocytopenia  · Resolved, platelets normal     *Peripheral neuropathy  · Patient was on gabapentin 300 mg twice daily.  · Gabapentin was discontinued on 9/30/2022.  · Neuropathy stable     *Nausea vomiting and abdominal discomfort  · We will prescribe Zofran and Compazine  · Will obtain PET/CT for further evaluation.  · The last PET/CT was on 11/23/2022 and patient has not been on any treatment     *GI prophylaxis  · Protonix 40 mg daily    *Deconditioning  · Patient continues to be very sedentary and in bed most of the  time.  · She is progressively getting weaker and I do not believe that she will be able to tolerate chemotherapy with her current functional status.  · Recommend physical therapy and Occupational Therapy.  · Still spending most of her time in bed.  Not eating much so her weight has significantly declined.  Again recommended they start working with PT and OT.  · Urine checked today due to previous mental status changes.  Trace bacteria, otherwise no signs of infection.      *Hypokalemia  · She has been on KCl 20 mEq twice daily.  · Potassium was 5  · Decrease the dose to 20 mEq daily    RECOMMENDATIONS:  1. Completed radiation 10/11/2022  2. nContinues o Cymbalta 30 mg  3. 1 L normal saline today  4. PET/CT, next available.   5.  Zofran and Compazine for nausea  6. Follow-up in 2 to 3 weeks with PET/CT        Salma Snell MD

## 2023-01-05 RX ORDER — ONDANSETRON HYDROCHLORIDE 8 MG/1
8 TABLET, FILM COATED ORAL EVERY 8 HOURS PRN
Qty: 90 TABLET | Refills: 3 | Status: SHIPPED | OUTPATIENT
Start: 2023-01-05

## 2023-01-11 DIAGNOSIS — C34.90 NON-SMALL CELL LUNG CANCER METASTATIC TO BONE: ICD-10-CM

## 2023-01-11 DIAGNOSIS — C79.51 NON-SMALL CELL LUNG CANCER METASTATIC TO BONE: ICD-10-CM

## 2023-01-11 DIAGNOSIS — F41.9 ANXIETY: ICD-10-CM

## 2023-01-11 RX ORDER — OXYCODONE HYDROCHLORIDE 15 MG/1
15 TABLET ORAL EVERY 4 HOURS PRN
Qty: 120 TABLET | Refills: 0 | Status: SHIPPED | OUTPATIENT
Start: 2023-01-11 | End: 2023-01-12 | Stop reason: SDUPTHER

## 2023-01-11 RX ORDER — POTASSIUM CHLORIDE 750 MG/1
20 CAPSULE, EXTENDED RELEASE ORAL 2 TIMES DAILY
Qty: 60 CAPSULE | Refills: 1 | Status: SHIPPED | OUTPATIENT
Start: 2023-01-11 | End: 2023-01-12 | Stop reason: SDUPTHER

## 2023-01-11 NOTE — TELEPHONE ENCOUNTER
Rx Refill Note  Requested Prescriptions     Pending Prescriptions Disp Refills   • ALPRAZolam (XANAX) 0.5 MG tablet 120 tablet 2     Si p.o. every 6 hours, change in quantity, metastatic cancer   • rizatriptan (MAXALT) 10 MG tablet 12 tablet 5     Sig: Take 1 tablet by mouth 1 (One) Time for 1 dose. AT ONSET OF HEADACHE MAY REPEAT ONE TABLET IN 2 HOURS IF NEEDED      Last office visit with prescribing clinician: 2022   Last telemedicine visit with prescribing clinician: 3/10/2023   Next office visit with prescribing clinician: 3/10/2023                         Would you like a call back once the refill request has been completed: [] Yes [] No    If the office needs to give you a call back, can they leave a voicemail: [] Yes [] No    North Young MA  23, 12:44 EST

## 2023-01-11 NOTE — TELEPHONE ENCOUNTER
Caller: BK CARPENTER    Relationship: Emergency Contact    Best call back number: [phone]     Requested Prescriptions:   Requested Prescriptions     Pending Prescriptions Disp Refills   • ALPRAZolam (XANAX) 0.5 MG tablet 120 tablet 2     Si p.o. every 6 hours, change in quantity, metastatic cancer   • rizatriptan (MAXALT) 10 MG tablet 12 tablet 5     Sig: Take 1 tablet by mouth 1 (One) Time for 1 dose. AT ONSET OF HEADACHE MAY REPEAT ONE TABLET IN 2 HOURS IF NEEDED        Pharmacy where request should be sent: [id number] - [place], [place] - [address] AT [facility] [phone] [facility] [phone] FX       Does the patient have less than a 3 day supply:  [] Yes  [x] No    Would you like a call back once the refill request has been completed: [x] Yes [] No    If the office needs to give you a call back, can they leave a voicemail: [x] Yes [] No    Pepito Mckinley Rep   23 12:25 EST

## 2023-01-12 ENCOUNTER — HOSPITAL ENCOUNTER (OUTPATIENT)
Dept: PET IMAGING | Facility: HOSPITAL | Age: 58
Discharge: HOME | End: 2023-01-12
Payer: COMMERCIAL

## 2023-01-12 DIAGNOSIS — Z17.0 MALIGNANT NEOPLASM OF UPPER-INNER QUADRANT OF LEFT BREAST IN FEMALE, ESTROGEN RECEPTOR POSITIVE: ICD-10-CM

## 2023-01-12 DIAGNOSIS — C34.90 NON-SMALL CELL LUNG CANCER METASTATIC TO BONE: ICD-10-CM

## 2023-01-12 DIAGNOSIS — C79.51 NON-SMALL CELL LUNG CANCER METASTATIC TO BONE: ICD-10-CM

## 2023-01-12 DIAGNOSIS — C50.212 MALIGNANT NEOPLASM OF UPPER-INNER QUADRANT OF LEFT BREAST IN FEMALE, ESTROGEN RECEPTOR POSITIVE: ICD-10-CM

## 2023-01-12 LAB — GLUCOSE BLDC GLUCOMTR-MCNC: 96 MG/DL (ref 70–130)

## 2023-01-12 PROCEDURE — A9552 F18 FDG: HCPCS | Performed by: INTERNAL MEDICINE

## 2023-01-12 PROCEDURE — 78815 PET IMAGE W/CT SKULL-THIGH: CPT

## 2023-01-12 PROCEDURE — 0 FLUDEOXYGLUCOSE F18 SOLUTION: Performed by: INTERNAL MEDICINE

## 2023-01-12 PROCEDURE — 82962 GLUCOSE BLOOD TEST: CPT

## 2023-01-12 RX ORDER — ALPRAZOLAM 0.5 MG/1
TABLET ORAL
Qty: 120 TABLET | Refills: 1 | Status: SHIPPED | OUTPATIENT
Start: 2023-01-12 | End: 2023-02-13 | Stop reason: SDUPTHER

## 2023-01-12 RX ORDER — POTASSIUM CHLORIDE 750 MG/1
20 CAPSULE, EXTENDED RELEASE ORAL 2 TIMES DAILY
Qty: 60 CAPSULE | Refills: 1 | Status: SHIPPED | OUTPATIENT
Start: 2023-01-12 | End: 2023-02-07 | Stop reason: SDUPTHER

## 2023-01-12 RX ORDER — RIZATRIPTAN BENZOATE 10 MG/1
10 TABLET ORAL ONCE
Qty: 12 TABLET | Refills: 5 | Status: SHIPPED | OUTPATIENT
Start: 2023-01-12 | End: 2023-01-12

## 2023-01-12 RX ORDER — OXYCODONE HYDROCHLORIDE 15 MG/1
15 TABLET ORAL EVERY 4 HOURS PRN
Qty: 120 TABLET | Refills: 0 | Status: SHIPPED | OUTPATIENT
Start: 2023-01-12 | End: 2023-02-06

## 2023-01-12 RX ADMIN — FLUDEOXYGLUCOSE F18 1 DOSE: 300 INJECTION INTRAVENOUS at 09:08

## 2023-01-12 NOTE — TELEPHONE ENCOUNTER
Caller: BK CARPENTER    Relationship: Emergency Contact    Best call back number: [phone]    Requested Prescriptions:   Requested Prescriptions     Pending Prescriptions Disp Refills   • ALPRAZolam (XANAX) 0.5 MG tablet 120 tablet 2     Si p.o. every 6 hours, change in quantity, metastatic cancer   • rizatriptan (MAXALT) 10 MG tablet 12 tablet 5     Sig: Take 1 tablet by mouth 1 (One) Time for 1 dose. AT ONSET OF HEADACHE MAY REPEAT ONE TABLET IN 2 HOURS IF NEEDED        Pharmacy where request should be sent: [facility] #[id number] - [place], [place] - [address] AT [facility] - [phone]  - [phone] FX     Additional details provided by patient: PATIENTS  DOES NOT KNOW EXACTLY HOW MANY ARE LEFT    Does the patient have less than a 3 day supply:  [x] Yes  [] No    Would you like a call back once the refill request has been completed: [x] Yes [] No    If the office needs to give you a call back, can they leave a voicemail: [x] Yes [] No    Pepito Decker Rep   23 10:02 EST

## 2023-01-12 NOTE — TELEPHONE ENCOUNTER
Rx Refill Note  Requested Prescriptions     Pending Prescriptions Disp Refills   • ALPRAZolam (XANAX) 0.5 MG tablet 120 tablet 2     Si p.o. every 6 hours, change in quantity, metastatic cancer   • rizatriptan (MAXALT) 10 MG tablet 12 tablet 5     Sig: Take 1 tablet by mouth 1 (One) Time for 1 dose. AT ONSET OF HEADACHE MAY REPEAT ONE TABLET IN 2 HOURS IF NEEDED      Last office visit with prescribing clinician: 2022   Last telemedicine visit with prescribing clinician: 3/10/2023   Next office visit with prescribing clinician: 3/10/2023                         Would you like a call back once the refill request has been completed: [] Yes [] No    If the office needs to give you a call back, can they leave a voicemail: [] Yes [] No    [place] Young MA  23, 10:17 EST

## 2023-01-18 ENCOUNTER — TELEPHONE (OUTPATIENT)
Dept: ONCOLOGY | Facility: CLINIC | Age: 58
End: 2023-01-18
Payer: COMMERCIAL

## 2023-01-18 NOTE — SERVICEHPINOTES
55-year-old female without GI complaints.  Family history is negative for polyps or colon cancer.  She presents for a screening colonoscopy. ambulatory

## 2023-01-18 NOTE — TELEPHONE ENCOUNTER
Caller: BK CARPENTER    Relationship: Emergency Contact    Best call back number: [phone]    What is the best time to reach you: ANYTIME    Who are you requesting to speak with (clinical staff, provider,  specific staff member): SCHEDULING    What was the call regarding: BK WANTED TO SEE IF THERE WERE ANY EARLIER APPT TIMES FOR PTS 1/20 APPT FOR EITHER Thursday OR Friday. THEY HAVE TO  THEIR GRANDDAUGHTER AT SCHOOL. PLEASE CALL TO ADVISE.     Do you require a callback: YES

## 2023-01-20 ENCOUNTER — APPOINTMENT (OUTPATIENT)
Dept: ONCOLOGY | Facility: HOSPITAL | Age: 58
End: 2023-01-20
Payer: COMMERCIAL

## 2023-01-20 ENCOUNTER — TELEPHONE (OUTPATIENT)
Dept: ONCOLOGY | Facility: CLINIC | Age: 58
End: 2023-01-20

## 2023-01-20 DIAGNOSIS — C50.212 MALIGNANT NEOPLASM OF UPPER-INNER QUADRANT OF LEFT BREAST IN FEMALE, ESTROGEN RECEPTOR POSITIVE: ICD-10-CM

## 2023-01-20 DIAGNOSIS — C79.51 NON-SMALL CELL LUNG CANCER METASTATIC TO BONE: Primary | ICD-10-CM

## 2023-01-20 DIAGNOSIS — Z17.0 MALIGNANT NEOPLASM OF UPPER-INNER QUADRANT OF LEFT BREAST IN FEMALE, ESTROGEN RECEPTOR POSITIVE: ICD-10-CM

## 2023-01-20 DIAGNOSIS — C34.90 NON-SMALL CELL LUNG CANCER METASTATIC TO BONE: Primary | ICD-10-CM

## 2023-01-20 NOTE — PROGRESS NOTES
Call from Jelani reporting Marcelle vomited when they got in the car to come for her appt today and they have gone back home to clean her up.  Notified Dr Snell, she has ordered MRI of the brain to be done ASAP. Will reschedule her for Monday.

## 2023-01-20 NOTE — TELEPHONE ENCOUNTER
Caller: BK CARPENTER    Relationship to patient: Emergency Contact    Best call back number: [phone]    Chief complaint: PT SICK, NEEDS TO RESCHEDULE TODAYS APPOINTMENT

## 2023-01-23 ENCOUNTER — HOSPITAL ENCOUNTER (OUTPATIENT)
Dept: MRI IMAGING | Facility: HOSPITAL | Age: 58
Discharge: HOME | End: 2023-01-23
Admitting: INTERNAL MEDICINE
Payer: COMMERCIAL

## 2023-01-23 ENCOUNTER — APPOINTMENT (OUTPATIENT)
Dept: ONCOLOGY | Facility: HOSPITAL | Age: 58
End: 2023-01-23
Payer: COMMERCIAL

## 2023-01-23 DIAGNOSIS — C50.212 MALIGNANT NEOPLASM OF UPPER-INNER QUADRANT OF LEFT BREAST IN FEMALE, ESTROGEN RECEPTOR POSITIVE: ICD-10-CM

## 2023-01-23 DIAGNOSIS — C34.90 NON-SMALL CELL LUNG CANCER METASTATIC TO BONE: Primary | ICD-10-CM

## 2023-01-23 DIAGNOSIS — Z45.2 ENCOUNTER FOR FITTING AND ADJUSTMENT OF VASCULAR CATHETER: ICD-10-CM

## 2023-01-23 DIAGNOSIS — Z17.0 MALIGNANT NEOPLASM OF UPPER-INNER QUADRANT OF LEFT BREAST IN FEMALE, ESTROGEN RECEPTOR POSITIVE: ICD-10-CM

## 2023-01-23 DIAGNOSIS — C79.51 NON-SMALL CELL LUNG CANCER METASTATIC TO BONE: Primary | ICD-10-CM

## 2023-01-23 LAB — CREAT BLDA-MCNC: 0.8 MG/DL (ref 0.6–1.3)

## 2023-01-23 PROCEDURE — 70553 MRI BRAIN STEM W/O & W/DYE: CPT

## 2023-01-23 PROCEDURE — 0 GADOBENATE DIMEGLUMINE 529 MG/ML SOLUTION: Performed by: INTERNAL MEDICINE

## 2023-01-23 PROCEDURE — A9577 INJ MULTIHANCE: HCPCS | Performed by: INTERNAL MEDICINE

## 2023-01-23 PROCEDURE — 25010000002 HEPARIN LOCK FLUSH PER 10 UNITS: Performed by: INTERNAL MEDICINE

## 2023-01-23 PROCEDURE — 82565 ASSAY OF CREATININE: CPT

## 2023-01-23 RX ORDER — HEPARIN SODIUM (PORCINE) LOCK FLUSH IV SOLN 100 UNIT/ML 100 UNIT/ML
500 SOLUTION INTRAVENOUS AS NEEDED
Status: CANCELLED | OUTPATIENT
Start: 2023-01-23

## 2023-01-23 RX ORDER — SODIUM CHLORIDE 0.9 % (FLUSH) 0.9 %
10 SYRINGE (ML) INJECTION AS NEEDED
Status: DISCONTINUED | OUTPATIENT
Start: 2023-01-23 | End: 2023-01-24 | Stop reason: HOSPADM

## 2023-01-23 RX ORDER — HEPARIN SODIUM (PORCINE) LOCK FLUSH IV SOLN 100 UNIT/ML 100 UNIT/ML
500 SOLUTION INTRAVENOUS AS NEEDED
Status: DISCONTINUED | OUTPATIENT
Start: 2023-01-23 | End: 2023-01-24 | Stop reason: HOSPADM

## 2023-01-23 RX ORDER — SODIUM CHLORIDE 0.9 % (FLUSH) 0.9 %
10 SYRINGE (ML) INJECTION AS NEEDED
Status: CANCELLED | OUTPATIENT
Start: 2023-01-23

## 2023-01-23 RX ADMIN — Medication 500 UNITS: at 14:32

## 2023-01-23 RX ADMIN — Medication 10 ML: at 14:32

## 2023-01-23 RX ADMIN — GADOBENATE DIMEGLUMINE 11 ML: 529 INJECTION, SOLUTION INTRAVENOUS at 13:57

## 2023-01-24 ENCOUNTER — INFUSION (OUTPATIENT)
Dept: ONCOLOGY | Facility: HOSPITAL | Age: 58
End: 2023-01-24
Payer: COMMERCIAL

## 2023-01-24 ENCOUNTER — OFFICE VISIT (OUTPATIENT)
Dept: ONCOLOGY | Facility: CLINIC | Age: 58
End: 2023-01-24
Payer: COMMERCIAL

## 2023-01-24 VITALS
BODY MASS INDEX: 22.61 KG/M2 | TEMPERATURE: 97.8 F | RESPIRATION RATE: 16 BRPM | SYSTOLIC BLOOD PRESSURE: 115 MMHG | HEART RATE: 87 BPM | OXYGEN SATURATION: 97 % | DIASTOLIC BLOOD PRESSURE: 77 MMHG | WEIGHT: 127.6 LBS | HEIGHT: 63 IN

## 2023-01-24 DIAGNOSIS — C50.212 MALIGNANT NEOPLASM OF UPPER-INNER QUADRANT OF LEFT BREAST IN FEMALE, ESTROGEN RECEPTOR POSITIVE: ICD-10-CM

## 2023-01-24 DIAGNOSIS — C79.51 NON-SMALL CELL LUNG CANCER METASTATIC TO BONE: Primary | ICD-10-CM

## 2023-01-24 DIAGNOSIS — Z17.0 MALIGNANT NEOPLASM OF UPPER-INNER QUADRANT OF LEFT BREAST IN FEMALE, ESTROGEN RECEPTOR POSITIVE: ICD-10-CM

## 2023-01-24 DIAGNOSIS — C34.90 NON-SMALL CELL LUNG CANCER METASTATIC TO BONE: Primary | ICD-10-CM

## 2023-01-24 DIAGNOSIS — C79.51 NON-SMALL CELL LUNG CANCER METASTATIC TO BONE: ICD-10-CM

## 2023-01-24 DIAGNOSIS — C34.90 NON-SMALL CELL LUNG CANCER METASTATIC TO BONE: ICD-10-CM

## 2023-01-24 DIAGNOSIS — Z45.2 ENCOUNTER FOR FITTING AND ADJUSTMENT OF VASCULAR CATHETER: Primary | ICD-10-CM

## 2023-01-24 LAB
ALBUMIN SERPL-MCNC: 2.5 G/DL (ref 3.5–5.2)
ALBUMIN/GLOB SERPL: 0.9 G/DL (ref 1.1–2.4)
ALP SERPL-CCNC: 149 U/L (ref 38–116)
ALT SERPL W P-5'-P-CCNC: 9 U/L (ref 0–33)
ANION GAP SERPL CALCULATED.3IONS-SCNC: 12.6 MMOL/L (ref 5–15)
AST SERPL-CCNC: 20 U/L (ref 0–32)
BASOPHILS # BLD AUTO: 0.07 10*3/MM3 (ref 0–0.2)
BASOPHILS NFR BLD AUTO: 0.8 % (ref 0–1.5)
BILIRUB SERPL-MCNC: 0.2 MG/DL (ref 0.2–1.2)
BUN SERPL-MCNC: 4 MG/DL (ref 6–20)
BUN/CREAT SERPL: 4.7 (ref 7.3–30)
CALCIUM SPEC-SCNC: 7.8 MG/DL (ref 8.5–10.2)
CHLORIDE SERPL-SCNC: 106 MMOL/L (ref 98–107)
CO2 SERPL-SCNC: 21.4 MMOL/L (ref 22–29)
CREAT SERPL-MCNC: 0.85 MG/DL (ref 0.6–1.1)
DEPRECATED RDW RBC AUTO: 47 FL (ref 37–54)
EGFRCR SERPLBLD CKD-EPI 2021: 79 ML/MIN/1.73
EOSINOPHIL # BLD AUTO: 0.3 10*3/MM3 (ref 0–0.4)
EOSINOPHIL NFR BLD AUTO: 3.5 % (ref 0.3–6.2)
ERYTHROCYTE [DISTWIDTH] IN BLOOD BY AUTOMATED COUNT: 14.1 % (ref 12.3–15.4)
GLOBULIN UR ELPH-MCNC: 2.7 GM/DL (ref 1.8–3.5)
GLUCOSE SERPL-MCNC: 140 MG/DL (ref 74–124)
HCT VFR BLD AUTO: 37.4 % (ref 34–46.6)
HGB BLD-MCNC: 11.9 G/DL (ref 12–15.9)
IMM GRANULOCYTES # BLD AUTO: 0.03 10*3/MM3 (ref 0–0.05)
IMM GRANULOCYTES NFR BLD AUTO: 0.3 % (ref 0–0.5)
LYMPHOCYTES # BLD AUTO: 0.96 10*3/MM3 (ref 0.7–3.1)
LYMPHOCYTES NFR BLD AUTO: 11.2 % (ref 19.6–45.3)
MAGNESIUM SERPL-MCNC: 1.6 MG/DL (ref 1.8–2.5)
MCH RBC QN AUTO: 28.7 PG (ref 26.6–33)
MCHC RBC AUTO-ENTMCNC: 31.8 G/DL (ref 31.5–35.7)
MCV RBC AUTO: 90.3 FL (ref 79–97)
MONOCYTES # BLD AUTO: 0.77 10*3/MM3 (ref 0.1–0.9)
MONOCYTES NFR BLD AUTO: 9 % (ref 5–12)
NEUTROPHILS NFR BLD AUTO: 6.46 10*3/MM3 (ref 1.7–7)
NEUTROPHILS NFR BLD AUTO: 75.2 % (ref 42.7–76)
NRBC BLD AUTO-RTO: 0 /100 WBC (ref 0–0.2)
PLATELET # BLD AUTO: 232 10*3/MM3 (ref 140–450)
PMV BLD AUTO: 9.1 FL (ref 6–12)
POTASSIUM SERPL-SCNC: 2.9 MMOL/L (ref 3.5–4.7)
PROT SERPL-MCNC: 5.2 G/DL (ref 6.3–8)
RBC # BLD AUTO: 4.14 10*6/MM3 (ref 3.77–5.28)
SODIUM SERPL-SCNC: 140 MMOL/L (ref 134–145)
WBC NRBC COR # BLD: 8.59 10*3/MM3 (ref 3.4–10.8)

## 2023-01-24 PROCEDURE — 36591 DRAW BLOOD OFF VENOUS DEVICE: CPT

## 2023-01-24 PROCEDURE — 25010000002 HEPARIN LOCK FLUSH PER 10 UNITS: Performed by: INTERNAL MEDICINE

## 2023-01-24 PROCEDURE — 80053 COMPREHEN METABOLIC PANEL: CPT

## 2023-01-24 PROCEDURE — 83735 ASSAY OF MAGNESIUM: CPT

## 2023-01-24 PROCEDURE — 99215 OFFICE O/P EST HI 40 MIN: CPT | Performed by: INTERNAL MEDICINE

## 2023-01-24 PROCEDURE — 85025 COMPLETE CBC W/AUTO DIFF WBC: CPT

## 2023-01-24 RX ORDER — FOLIC ACID 1 MG/1
1000 TABLET ORAL DAILY
Qty: 30 TABLET | Refills: 3 | Status: SHIPPED | OUTPATIENT
Start: 2023-01-24 | End: 2023-01-30

## 2023-01-24 RX ORDER — HEPARIN SODIUM (PORCINE) LOCK FLUSH IV SOLN 100 UNIT/ML 100 UNIT/ML
500 SOLUTION INTRAVENOUS AS NEEDED
Status: CANCELLED | OUTPATIENT
Start: 2023-01-24

## 2023-01-24 RX ORDER — SODIUM CHLORIDE 0.9 % (FLUSH) 0.9 %
10 SYRINGE (ML) INJECTION AS NEEDED
Status: DISCONTINUED | OUTPATIENT
Start: 2023-01-24 | End: 2023-01-24 | Stop reason: HOSPADM

## 2023-01-24 RX ORDER — OMEPRAZOLE 20 MG/1
20 CAPSULE, DELAYED RELEASE ORAL 2 TIMES DAILY
Qty: 180 CAPSULE | Refills: 1 | Status: SHIPPED | OUTPATIENT
Start: 2023-01-24

## 2023-01-24 RX ORDER — LETROZOLE 2.5 MG/1
2.5 TABLET, FILM COATED ORAL DAILY
Qty: 90 TABLET | Refills: 3 | Status: SHIPPED | OUTPATIENT
Start: 2023-01-24

## 2023-01-24 RX ORDER — HEPARIN SODIUM (PORCINE) LOCK FLUSH IV SOLN 100 UNIT/ML 100 UNIT/ML
500 SOLUTION INTRAVENOUS AS NEEDED
Status: DISCONTINUED | OUTPATIENT
Start: 2023-01-24 | End: 2023-01-24 | Stop reason: HOSPADM

## 2023-01-24 RX ORDER — SODIUM CHLORIDE 0.9 % (FLUSH) 0.9 %
10 SYRINGE (ML) INJECTION AS NEEDED
Status: CANCELLED | OUTPATIENT
Start: 2023-01-24

## 2023-01-24 RX ORDER — LEVOTHYROXINE SODIUM 0.03 MG/1
25 TABLET ORAL DAILY
Qty: 90 TABLET | Refills: 1 | OUTPATIENT
Start: 2023-01-24

## 2023-01-24 RX ADMIN — Medication 10 ML: at 10:40

## 2023-01-24 RX ADMIN — SODIUM CHLORIDE, PRESERVATIVE FREE 500 UNITS: 5 INJECTION INTRAVENOUS at 10:41

## 2023-01-24 NOTE — TELEPHONE ENCOUNTER
Caller: PAULINEBK    Relationship: Emergency Contact    Best call back number: [phone]    Requested Prescriptions:   Requested Prescriptions     Pending Prescriptions Disp Refills   • omeprazole (PrilOSEC) 20 MG capsule 180 capsule 1     Sig: Take 1 capsule by mouth 2 (Two) Times a Day.   • levothyroxine (SYNTHROID, LEVOTHROID) 25 MCG tablet 90 tablet 1     Sig: Take 1 tablet by mouth Daily.        Pharmacy where request should be sent: [facility] #[id number] - [place], [place] - [address] - [phone]  - [phone] FX     Additional details provided by patient:     Does the patient have less than a 3 day supply:  [x] Yes  [] No    Would you like a call back once the refill request has been completed: [x] Yes [] No    If the office needs to give you a call back, can they leave a voicemail: [x] Yes [] No    Pepito Solano Rep   01/24/23 09:55 EST

## 2023-01-24 NOTE — PROGRESS NOTES
[facility] INPATIENT PROGRESS NOTE    Length of Stay:  0 days    CHIEF COMPLAINT/REASON FOR VISIT:  Metastatic non-small cell lung cancer  Bilateral breast cancer    Interval history  Ms. Patel presents to the clinic today for follow-up.  She is accompanied by Jelani her .  The right upper quadrant abdominal pain has improved.  She is having intermittent episodes of vomiting.  Her mental status has improved and she is able to hold a good conversation with me today.  She is trying to ambulate around the house with the help of a walker.  She has been weaned off of the Cymbalta as well as Wellbutrin.  Currently she is only taking oxycodone maybe 1 tablet daily on the most.  Her appetite is good and weight remains stable.  She continues to experience right lower extremity weakness and pain in her back which are limiting her ability to get around.  Denies any headache or blurry vision.    Oncologic history  Ms. 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     3/1/2021-bilateral screening mammogram-microcalcifications seen in the posterior one third of the lateral aspect of the right breast.  Area of focal asymmetry seen in the middle one third of the upper inner quadrant of the left breast.     3/1/2021-DEXA scan-T score of -2.2 on the lumbar spine and T score of -2.3 in the left hip and T score of -1.9 in the right hip.  Findings consistent with osteopenia     3/23/2021-screening lung CT-there is a 10 x 11 mm solid nodule in the left upper lobe.  Enlarged AP window lymph node measuring 14 x 10 mm.  Suggestion of a possible 9 mm left hilar lymph node.  Heavy coronary artery calcification.     3/23/2021-diagnostic mammogram bilateral-cluster of microcalcifications in the middle third lateral aspect of the right breast.  Left breast demonstrates persistence of the area of focal asymmetry in the  region.     Ultrasound-left breast ultrasound at 10 o'clock position, 6 cm from the nipple there is a 0.4 cm irregular hypoechoic lesion.  Stereotactic biopsy of the right breast calcifications recommended.  Ultrasound-guided biopsy of the left breast lesion recommended     4/7/2021-right breast stereotactic biopsy and left breast ultrasound-guided biopsy  Pathology  Right breast-invasive ductal carcinoma, grade 2, lymphovascular invasion present, ER +99% strong, IN +80% moderate, HER-2 negative, Ki-67 12%  Left breast upper inner quadrant 10 o'clock position biopsy, invasive ductal carcinoma, grade   2, ER +99% strong, IN +40% strong, HER-2 2+ on immunohistochemistry, nonamplified on FISH Ki-67 10%     4/14/2021-PET/CT-FDG avid aortopulmonary window lymph node measures 0.9 cm with an SUV of 7.5.  FDG avid left hilar lymph node measures 1 cm with an SUV of 17.2.  Irregular soft tissue density in the right breast measuring 3.7 x 3.8 cm is favored to be secondary to the recent breast biopsy.  1.1 cm pulmonary nodule within the left upper lobe with SUV 9.7 .  Sub-6 mm pulmonary nodules are present in the right lower lobe.  No evidence of lymphadenopathy or metastatic disease in the abdomen.  Focal area of FDG uptake within the central canal posterior to T11-T12 displaced demonstrating an SUV of 5.5 thought to be reactive however MRI is recommended for further evaluation.     She was seen by Dr. Molina who initially planned for breast surgery however  due to the PET abnormalities she has been referred to Dr. Kerr who plans to do a wound on 4/30/2021.  Dr. Molina's and Dr. Kerr's notes reviewed.     Patient denies any family history of breast cancer.  Her mother had lymphoma.  Maternal grandmother had colon cancer.  Prior to that screening mammogram she did not have any palpable abnormalities of either breast.     She has been a heavy smoker for about 40 years and smoked 2 packs/day.  Denies any recent weight  changes, new bone pains, cough, abdominal pain nausea vomiting constipation or diarrhea.  She does have anxiety and has been on several medications.  She has recently been started on Xanax to help with the mood and also with insomnia.     Patient had a video-assisted thoracoscopy and mediastinal lymphadenectomy on 4/30/2021.  L5-L6 lymph nodes were biopsied however the small pulmonary nodule in the left upper lobe was not difficult to find.  Pathology showed moderately differentiated adenocarcinoma which is CK7 and TTF-1 positive.  Consistent with lung primary.  Ki-67 85%.     5/14/2021-MRI of the brain-minimal chronic small vessel ischemic change in the white matter.  Otherwise negative MRI.     5/20/2021-bilateral axillary ultrasound-no evidence of axillary lymphadenopathy in either axilla.  Normal-appearing bilateral axillary lymph nodes visualized.     Cycle 1 cisplatin and Alimta on 5/25/2021.     Cycle 2 cisplatin Alimta 6/15/2021     Cycle 3 cisplatin Alimta 7/7/2021     Completed radiation on 7/12/2021     Port was nonfunctional hence a port study was performed which showed that the port was backed up into the innominate vein and also there was a nonocclusive thrombus at the end of the catheter extending into the superior vena cava.  She was started on anticoagulation with Eliquis.  It was recommended for port revision of the intention to keep the port.     PET/CT 8/5/2021-images independently reviewed and interpreted by me-decrease in size and FDG uptake of the left upper lobe pulmonary nodule as well as mediastinal and left hilar lymphadenopathy representing response to treatment.  Sub-6 mm pulmonary nodule in the left upper lobe new since 4/14/2021.  This could be related to radiation however follow-up CT in 3 months recommended.  Decrease in size of the irregular masslike tissue in the right breast which is postbiopsy hematoma.  This is not PET avid.  New segmental left eighth rib fractures healing.  A  new band of sclerosis of the left seventh rib which favored to represent healing nondisplaced fracture.  Follow-up CT recommended.  focal uptake in the duodenum first and second portions.  Could be related to duodenitis.  Indeterminate lesion in the L1 vertebral body not well evaluated on PET/CT.     10/1/2021-MRI of the lumbar spine.  Lesion in the left posterior body of L1 measures 14 x 14 x 12 mm and previously 5 x 5 x 6 mm.  The interval enlargement strongly suggest that it is metastatic lesion.  No additional osseous metastasis noted.   Other chronic changes noted.     10/14/2021-biopsy of the lumbar spine lesion-pathology consistent with poorly differentiated carcinoma.  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     10/26/2021-brain MRI-no evidence of metastatic disease.     10/26/2021-bilateral diagnostic mammogram and ultrasound  Scattered fibroglandular density in both breast.  Postbiopsy hematoma with internal biopsy clip in the upper outer quadrant of the right breast, 8 cm from the nipple.  The hematoma size is decreased to 2.9 cm from 3.6 cm earlier.     Left breast-parenchymal density measuring 9 x 10 mm has markedly decreased.  Few adjacent calcifications which are unchanged.  No new abnormality in the left breast.  At 10:00 region there is some minimal adjacent hypoechoic texture which is difficult to measure but appears smaller since the previous exam.     10/28/2021-PET/CT  New L1 and L2 lytic bone metastasis annually intensely hypermetabolic focus at the left adrenal gland likely represents metastatic disease as well.  Slight decrease in size of the 0.9 x 0.7 cm left upper lobe pulmonary nodule.  There is hypermetabolic activity related to radiation pneumonitis.  The remainder of the nodule is photopenic.  Uncertain etiology of the more intense activity along the right lateral peripheral margin of the 2.6 cm  postbiopsy density of the right breast.     She completed radiation to to the lumbar spine on 11/19/2021 11/22/2021-cycle 1 Alimta and Keytruda     3/23/2022-bilateral diagnostic mammogram and ultrasound  Complete resolution of the microcalcifications in the upper outer quadrant of the right breast and decrease in size of the postbiopsy hematoma.  No suspicious abnormalities in the right breast.  Benign-appearing calcifications at the site of malignancy in the left breast upper inner quadrant.  No other suspicious findings.     4/11/2022 PET/CT-there is new groundglass opacities in the left upper lobe which are most likely postradiation changes.  Other groundglass opacities in the left lung as well as the right lung remained stable.  Increased nodularity of the left adrenal gland with an SUV of 5.6, previously 3.5.    Increased uptake in the L1 vertebra in the right posterior aspect with an SUV of 3.6.  Left L1 sclerosis increased     Due to this the case was discussed in multidisciplinary conference.  The disease progression was significant in the left adrenal gland as well as the L1 vertebra.  Since there were only 2 areas of disease progression it has been decided to proceed with radiation to these 2 spots and continue on Keytruda and Alimta.     Patient also has had worsening of her kidney function.  We initially held treatment as of 5/31/2022 and creatinine bumped up to 2.0, BUN 23.  We then resumed therapy 6/11/2022 with Keytruda alone.  Patient was referred to nephrology.     6/6/2022-MRI of the spine-diffuse marrow replacement in the L1 vertebral body and inferior endplate concavity.  Suspicious for metastatic disease with pathological fracture in the inferior endplate.  Also diffuse signal abnormality in the L2 lamina on the left suspicious for additional metastatic disease.  Abnormality of the first sacral segment suspicious for metastatic disease.  Left adrenal gland appears enlarged.  30 to 40%  height loss and L2 vertebral body suggestive of a subacute compression fracture.  Degenerative changes.     7/18/2022-PET/CT  Multiple hypermetabolic osseous lesions with index lesions which have either increased in degree of FDG uptake or newly hypermetabolic lesions representative of metastatic disease and progression of disease.  Possible FDG avid lesion in the left femoral diaphysis however these are incompletely visualized and in the area of artifactual FDG uptake.  MRI of the left femur recommended for further evaluation.  Increasing FDG uptake of the left adrenal gland.  Focal left hilar hypermetabolic him corresponds to the lymph node which has minimally increased in size compared to April 2022.  New sub-6 mm pulmonary nodules.     7/26/2022-cycle 1 of Taxotere     Patient was admitted to the hospital 8/3/2022 discharged on 8/9/2022.  She was admitted for strokelike symptoms and confusion.  The confusion was thought to be secondary to polypharmacy and probably febrile neutropenia.  She was treated with antibiotics.  Mental status improved subsequently.  MRI of the cervical thoracic and lumbar spine was performed on 8/4/2022.  Images independently reviewed by me.  Multiple metastatic lesions in the spine noted.  There was a lesion on the CT which was concerning.  There is also a sacral Lesion measuring up to 5 cm in transverse dimension on the right.  Patient was symptomatic with pain on the right side of the sacrum.  Radiation oncology recommended radiation to the cervical spine as well as sacrum.  CT head without any obvious abnormalities.     Completed radiation to the cervical spine and sacrum on 8/12/2022    She was admitted to the hospital between 9/23/2022 to 10/12/2022.  She was admitted for poorly controlled pain as well as encephalopathy and multiple falls.  She was diagnosed with a UTI and treated with IV Rocephin.  Pain medications changed and also additional radiation to the right sacrum was  performed.  3000 cGy in 10 fractions was administered.  She was discharged on oxycodone 15 mg every 4 hours as needed for pain and OxyContin twice daily.  She was recommended to go to a subacute rehab however she preferred to be discharged home with home physical therapy and Occupational Therapy    9/24/2022-MRI of the pelvis showed osseous metastatic disease in the sacrum worst within the right and probable pathological fractures bilaterally.    10/4/2022 CT head without any evidence of metastatic disease or acute abnormalities.    10/25/2022-PET/CT-resolution of hypermetabolic activity in the upper lobe and left hilum.  Previously noted new tiny pulmonary nodules have resolved.  Resolution of hypermetabolic activity in the left adrenal gland.  Near complete resolution of hypermetabolic activity in the skeletal metastasis particularly the cervical spine, pelvic bones and proximal left femur.  Maximal SUV at the right sacral metastasis is 2.7 and previously it was 8.9.  Tiny focus of hypermetabolic activity at the GE junction with a maximal SUV of 4.7.  No definite thickening or abnormality noted on the CT.    11/15/2022-lumbar puncture with negative cytology of the CSF.    11/21/2022-MRI of the brain with contrast and MRI of the cervical spine with contrast without any evidence of metastatic disease.    1/12/2023-PET/CT  Hypermetabolic mediastinal lymphadenopathy and multiple liver lesions which are hypermetabolic suggestive of metastasis.  New tiny left pleural effusion is noted.  Progression of bilateral sacral fractures and right L5 transverse process fracture.  There is also an acute/subacute L1 fracture.  Lumbar spine x-rays have been recommended for evaluation of the vertebral height or lumbar MRI to be compared to the priors.    1/23/2023-MRI of the brain  At least 9 separate tiny 2 to 6 mm enhancing lesions in the brain compatible with multiple new brain metastasis.  There has been interval development of 3  separate foci of encephalomalacia concerning for chronic infarcts but these are new since August 2022.  Stable osseous metastasis at C2.    PHYSICAL EXAMINATION:    General: Oriented to person, place, and time.  Ill-appearing.  Pale.  HEENT: Periorbital edema has resolved.  Chest/Lungs: Clear to auscultation bilaterally. Right chest mediport in place  Heart: RRR  Abdomen/GI: soft, NT, ND, NABS  Extremities: Bilateral lower extremity without any swelling.  Decreased strength in both lower extremities.  No focal deficits.    I have reexamined the patient and the results are consistent with the previously documented exam. Salma Snell MD     DIAGNOSTIC DATA:  Results Review:     I reviewed the patient's new clinical results.    Results from last 7 days   Lab Units 01/24/23  0915   WBC 10*3/mm3 8.59   HEMOGLOBIN g/dL 11.9*   HEMATOCRIT % 37.4   PLATELETS 10*3/mm3 232     Lab Results   Component Value Date    NEUTROABS 6.46 01/24/2023     Results from last 7 days   Lab Units 01/23/23  1314   CREATININE mg/dL 0.80                 IMAGING:      PET/CT 1/12/2023 and MRI of the brain 1/23/2023-images independently reviewed and interpreted by me.    ASSESSMENT:  This is a 57 y.o. female with:     *Metastatic non-small cell lung cancer  · She was initially diagnosed with stage III (T1N2M0) disease.    · She received initial therapy with cisplatin and Alimta concurrent with radiation therapy beginning 5/25/2021.    · There were indeterminate findings in the lumbar spine at L1 and it was recommended to monitor this over time.    · Patient completed 3 cycles cisplatin and Alimta 7/7/2021 and completed radiation therapy 7/12/2021.    · PET scan 8/6/2021 showed good response to treatment.    · MRI lumbar spine 10/1/2021 with increase in size of the L1 lesion.    · Biopsy of the L1 lesion on 10/14/2022 confirmed metastatic adenocarcinoma of lung origin, PD-L1 TPS 0.    · PET scan 10/26/2021 with involvement of left adrenal and  L1/L2 only.    · Patient received radiation therapy to L1/L2 on 11/19/2021.  She also received radiation to the left adrenal gland.    · She initiated further systemic therapy on 11/22/2021 with Keytruda and Alimta.   · Follow-up PET scan 1/18/2022 with decrease in small left upper lobe pulmonary nodule, resolution of activity at L1/L2, no new sites of disease.    · Guardant 360 CT DNA level was monitored and was decreasing.    · PET scan 4/11/2022 with progression in left adrenal and L1.    · Maintained systemic therapy with Keytruda and Alimta and received additional radiation to left adrenal and L1.  Alimta however held since 5/10/2022 due to renal dysfunction.  Radiation completed to lumbar spine 7/14/2022.    · PET scan 7/18/2022 with multiple areas of progression of the spine and right sacrum.    · Guardant 360 analysis 7/20/2022 with no actionable mutations.    · Change in therapy to single agent palliative Taxotere initiated 7/26/2022.    · Altered mental status and febrile neutropenia requiring hospitalization 8/2 - 8/9/2022.  During that admission, MRI brain, cervical, thoracic, lumbar spine performed 8/4/2022 in addition to CT cervical spine 8/5/2022.  There was evidence of C2 metastatic lesion with some dural enhancement, compression deformity at T7 with mild edema suggesting acute to subacute fracture, 5 mm T12 spinous process metastasis, multifocal disease in the lumbar spine and sacrum, largest involving sacral ala to the right 5 cm in transverse dimension.  Loss of height at L2 25%.  · Patient received cycle 3 Taxotere 9/6/2022 with Neulasta support.    · She has been continuing as well on Xgeva every 4 weeks (last received 9/6/2022).  · Patient did undergo MRI lumbar spine and pelvis on 9/24/2022.  MRI pelvis showed bilateral sacral fractures with marrow infiltration related to metastasis more prominent on the right.  Lesion on the right 3.5 x 4.1 x 4.3 cm.  Also probable metastasis along posterior  acetabulum on the right 1.5 cm.  MRI lumbar spine with stable L1 metastasis, new edema endplate T12 possibly stress reaction versus developing new metastasis, lesion at T12 spinous process unchanged.   · Radiation oncology consulted with plans for palliative treatment of the right sacral metastasis.  Treatment initiated 9/28/2022  · Completed radiation to the right sacral metastasis on 10/11/2022.  · PET/CT 10/25/2022 without any metabolic uptake in the lung or the skeletal metastasis.  This is indicative of a positive response to Taxotere.  · Pain persistent sitting and walking position and not present when she is laying down.  · Patient is still very active and performance status is very poor.  Due to this reason chemotherapy will be continued to be held.  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  · Due to this reason an MRI of the brain was obtained but unfortunately this was performed without contrast.  Reviewed the MRI and no evidence of metastatic disease.  · 11/15/2022 lumbar puncture shows no evidence of malignancy in the CSF, elevated protein at 65.2, glucose normal at 58, culture no growth in 3 days  · Repeat MRI of the brain with contrast 11/21/2022 does not show any obvious abnormalities.  There is no enhancement of the CSF.  MRI of the cervical spine with no abnormalities.  · Patient unable to undergo MRI of the thoracic and lumbar spine due to mental status and unable to being in 1 position as well as with incontinence.  · 1/4/2023-mental status has improved significantly.  Patient reports decreased oral intake and nausea and vomiting.  She is also reporting abdominal discomfort.  · 1/12/2023-PET/CT with mediastinal lymphadenopathy as well as multiple liver lesions suggestive of disease progression.  · 1/23/2023-MRI of the brain with at least 9 different foci of small mets measuring up to 6 mm.  There is also areas of  encephalomalacia concerning for chronic infarcts.    · In the setting of disease progression we discussed resuming Taxotere.  She does have a fairly poor functional status however  · She does have a fairly poor functional status however mental status and ability to ambulate with the help of walker seem to be improving.  · I discussed with Dr. العلي with radiation oncology today regarding need for SBRT to the multiple brain lesions.  · She plans to present her case in the peer conference and let me know her recommendations.     *History of bilateral stage I breast cancer  · Patient with bilateral breast cancer, both stage I ([id number]), grade 2, ER/LA positive and HER2/melinda negative with low Ki-67.    · Patient initiated adjuvant letrozole in May 2021.  · Continue letrozole.    *Mental status changes  · Patient's mental status is back at baseline.  · Significant improvement  · Currently off all psychotropic medications.     *Cancer related pain  · Patient has been receiving Duragesic patch 50 mcg every 72 hours in addition to oxycodone 15 mg every 4 hours for breakthrough pain.  Duragesic dose had been escalated recently in the outpatient setting due to worsening pain  · On admission 9/23/2022, Duragesic patch increased to 75 mcg daily, added Decadron 4 mg IV every 6 hours with continuation of oxycodone 15 mg every 4 hours as needed for breakthrough pain in addition of Dilaudid 1 mg every 2 hours as needed for breakthrough pain.  · Patient with increasing side effects from narcotics, Duragesic decreased on 9/25/2022 from 75 down to 50 mcg patch every 72 hours.  · Dexamethasone dose decreased to 4 mg p.o. every 12 hours on 9/27/2022  · Initiated palliative radiation to the right sacrum on 9/28/2022  · Patient became confused after receiving Dilaudid.  Patient and family asked to discontinue Dilaudid.  · Duragesic patch dose was increased to 75 mcg/h on 9/30/2022. Subsequent decrease to 50 mcg due to  confusion  · Gabapentin was discontinued due to the sedating effect.  · Oxycodone is being used for breakthrough pain.  · 10/3 transition to sustained release oxycodone 40 mg bid and fentanyl patch stopped  · Poorly controlled, pain particularly worse in sitting and standing position.  · Unsure if extends is contributing to mental status changes as previously patient had been very sensitive to pain meds.  · Due to this reason we will discontinue the Xtampza ER and switch to OxyContin 20 mg twice daily.  · She continues to experience severe mental status changes.  · Long-acting pain medications discontinued due to mental status changes and she is currently on oxycodone 15 mg as needed.  · Reports better pain control after the epidural injection of the spine.  · Currently using oxycodone only as needed.      *Anxiety/depression  · She has been weaned off of Cymbalta as well as Wellbutrin.  · Mood stable.  Continue the same     *Insomnia  · She has been receiving Xanax as needed  ·      *Anemia  · Hemoglobin stable at 11.9     *Peripheral neuropathy  · Patient was on gabapentin 300 mg twice daily.  · Gabapentin was discontinued on 9/30/2022.  · Neuropathy stable     *Nausea vomiting and abdominal discomfort  · Continues to experience vomiting.  · Abdominal discomfort has resolved  · PET/CT with liver metastasis.  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  · Continue Compazine as needed for nausea     *GI prophylaxis  · Protonix 40 mg daily    *Deconditioning  · Gradually improving.  · Recommend that she start physical therapy.      *Hypokalemia  · Potassium low at 2.9  · Start 40 mEq of potassium daily.  Today administer 80 mEq total    RECOMMENDATIONS:  1. Resume Taxotere  2. Await radiation oncology recommendations on radiation to the brain  3. Replace potassium  4. Continue Compazine for nausea  5. Follow-up in 1 week for Taxotere.    60 minutes has been spent on the encounter including reviewing the  medical records, reviewing the imaging, face-to-face time, discussing with Dr. Carreno, documentation on the same day, care coordination.        Salma Snell MD

## 2023-01-25 ENCOUNTER — TELEPHONE (OUTPATIENT)
Dept: FAMILY MEDICINE CLINIC | Facility: CLINIC | Age: 58
End: 2023-01-25
Payer: COMMERCIAL

## 2023-01-25 ENCOUNTER — TELEPHONE (OUTPATIENT)
Dept: ONCOLOGY | Facility: CLINIC | Age: 58
End: 2023-01-25
Payer: COMMERCIAL

## 2023-01-25 RX ORDER — LEVOTHYROXINE SODIUM 0.03 MG/1
25 TABLET ORAL DAILY
Qty: 90 TABLET | Refills: 1 | Status: SHIPPED | OUTPATIENT
Start: 2023-01-25

## 2023-01-25 NOTE — TELEPHONE ENCOUNTER
Mr. Patel said that he wasn't aware and we are okay to not fill it as long as Dr. Gastelum doesn't need her to take it

## 2023-01-25 NOTE — TELEPHONE ENCOUNTER
Telephone call to Jelani to review Holli's magnesium level from yesterday. Instructed him to give her 400mg MagOx daily until she returns next week.  Reviewed appts for Tuesday with him as well.

## 2023-01-25 NOTE — TELEPHONE ENCOUNTER
Mr. Patel would like know why the levothyroxine (SYNTHROID, LEVOTHROID) 25 MCG tablet and only has 2 days left

## 2023-01-25 NOTE — TELEPHONE ENCOUNTER
Jelani wants to know if it is necessary for Holli to take thyroid meds I received message from pharmacy that she was not longer taking so we denied refill now he is questioning if this is what you want her to do? Please advise

## 2023-01-26 NOTE — TELEPHONE ENCOUNTER
Caller: ALEKSANDRA FROM [facility]    Best call back number: [phone]    What is the best time to reach you: ANYTIME    Who are you requesting to speak with (clinical staff, provider,  specific staff member): CLINICAL     Do you know the name of the person who called: ALEKSANDRA     What was the call regarding: ALEKSANDRA CALLING FROM [facility] TO SEE IF PT NEEDS TO BE ENROLLED TO KEEP GETTING HER KEYTRUDA AT A LOWER COST?    CALL HER BACK     Do you require a callback: YES

## 2023-01-30 ENCOUNTER — TELEPHONE (OUTPATIENT)
Dept: ONCOLOGY | Facility: CLINIC | Age: 58
End: 2023-01-30
Payer: COMMERCIAL

## 2023-01-30 RX ORDER — DEXAMETHASONE 4 MG/1
TABLET ORAL
Qty: 12 TABLET | Refills: 3 | Status: SHIPPED | OUTPATIENT
Start: 2023-01-30

## 2023-01-30 RX ORDER — FOLIC ACID 1 MG/1
TABLET ORAL
Qty: 90 TABLET | Refills: 1 | Status: SHIPPED | OUTPATIENT
Start: 2023-01-30

## 2023-01-30 NOTE — TELEPHONE ENCOUNTER
Jelani called about Holli's appt tomorrow.  He reports he has had some sinus congestion for a few days, denies fever.  Holli is without symptoms.  He reports ongoing vomiting.  He also inquired if she should be taking the steroid today since the plan is to give her treatment tomorrow.  Advised him yes, she should start the dex today.  Refill sent.  Reviewed appt tomorrow she will see Dr Snell.

## 2023-01-31 ENCOUNTER — INFUSION (OUTPATIENT)
Dept: ONCOLOGY | Facility: HOSPITAL | Age: 58
End: 2023-01-31
Payer: COMMERCIAL

## 2023-01-31 ENCOUNTER — OFFICE VISIT (OUTPATIENT)
Dept: ONCOLOGY | Facility: CLINIC | Age: 58
End: 2023-01-31
Payer: COMMERCIAL

## 2023-01-31 VITALS
HEIGHT: 63 IN | DIASTOLIC BLOOD PRESSURE: 88 MMHG | HEART RATE: 120 BPM | TEMPERATURE: 96.9 F | SYSTOLIC BLOOD PRESSURE: 117 MMHG | BODY MASS INDEX: 22.82 KG/M2 | WEIGHT: 128.8 LBS | RESPIRATION RATE: 16 BRPM | OXYGEN SATURATION: 97 %

## 2023-01-31 DIAGNOSIS — C79.51 NON-SMALL CELL LUNG CANCER METASTATIC TO BONE: Primary | ICD-10-CM

## 2023-01-31 DIAGNOSIS — R29.818 NEUROLOGICAL DEFICIT PRESENT: ICD-10-CM

## 2023-01-31 DIAGNOSIS — G89.3 CANCER RELATED PAIN: ICD-10-CM

## 2023-01-31 DIAGNOSIS — C50.212 MALIGNANT NEOPLASM OF UPPER-INNER QUADRANT OF LEFT BREAST IN FEMALE, ESTROGEN RECEPTOR POSITIVE: ICD-10-CM

## 2023-01-31 DIAGNOSIS — C34.90 NON-SMALL CELL LUNG CANCER METASTATIC TO BONE: Primary | ICD-10-CM

## 2023-01-31 DIAGNOSIS — E87.6 HYPOKALEMIA: ICD-10-CM

## 2023-01-31 DIAGNOSIS — Z45.2 ENCOUNTER FOR FITTING AND ADJUSTMENT OF VASCULAR CATHETER: ICD-10-CM

## 2023-01-31 DIAGNOSIS — Z17.0 MALIGNANT NEOPLASM OF UPPER-INNER QUADRANT OF LEFT BREAST IN FEMALE, ESTROGEN RECEPTOR POSITIVE: ICD-10-CM

## 2023-01-31 LAB
ALBUMIN SERPL-MCNC: 2.5 G/DL (ref 3.5–5.2)
ALBUMIN/GLOB SERPL: 1 G/DL (ref 1.1–2.4)
ALP SERPL-CCNC: 167 U/L (ref 38–116)
ALT SERPL W P-5'-P-CCNC: 9 U/L (ref 0–33)
ANION GAP SERPL CALCULATED.3IONS-SCNC: 11.3 MMOL/L (ref 5–15)
AST SERPL-CCNC: 23 U/L (ref 0–32)
BASOPHILS # BLD AUTO: 0.09 10*3/MM3 (ref 0–0.2)
BASOPHILS NFR BLD AUTO: 0.8 % (ref 0–1.5)
BILIRUB SERPL-MCNC: 0.2 MG/DL (ref 0.2–1.2)
BUN SERPL-MCNC: 6 MG/DL (ref 6–20)
BUN/CREAT SERPL: 6.8 (ref 7.3–30)
CALCIUM SPEC-SCNC: 8 MG/DL (ref 8.5–10.2)
CHLORIDE SERPL-SCNC: 107 MMOL/L (ref 98–107)
CO2 SERPL-SCNC: 19.7 MMOL/L (ref 22–29)
CREAT SERPL-MCNC: 0.88 MG/DL (ref 0.6–1.1)
DEPRECATED RDW RBC AUTO: 47.7 FL (ref 37–54)
EGFRCR SERPLBLD CKD-EPI 2021: 75.8 ML/MIN/1.73
EOSINOPHIL # BLD AUTO: 0.09 10*3/MM3 (ref 0–0.4)
EOSINOPHIL NFR BLD AUTO: 0.8 % (ref 0.3–6.2)
ERYTHROCYTE [DISTWIDTH] IN BLOOD BY AUTOMATED COUNT: 14.1 % (ref 12.3–15.4)
GLOBULIN UR ELPH-MCNC: 2.5 GM/DL (ref 1.8–3.5)
GLUCOSE SERPL-MCNC: 124 MG/DL (ref 74–124)
HCT VFR BLD AUTO: 35.8 % (ref 34–46.6)
HGB BLD-MCNC: 11.3 G/DL (ref 12–15.9)
IMM GRANULOCYTES # BLD AUTO: 0.04 10*3/MM3 (ref 0–0.05)
IMM GRANULOCYTES NFR BLD AUTO: 0.4 % (ref 0–0.5)
LYMPHOCYTES # BLD AUTO: 0.87 10*3/MM3 (ref 0.7–3.1)
LYMPHOCYTES NFR BLD AUTO: 8.1 % (ref 19.6–45.3)
MAGNESIUM SERPL-MCNC: 1.9 MG/DL (ref 1.8–2.5)
MCH RBC QN AUTO: 29 PG (ref 26.6–33)
MCHC RBC AUTO-ENTMCNC: 31.6 G/DL (ref 31.5–35.7)
MCV RBC AUTO: 92 FL (ref 79–97)
MONOCYTES # BLD AUTO: 0.76 10*3/MM3 (ref 0.1–0.9)
MONOCYTES NFR BLD AUTO: 7.1 % (ref 5–12)
NEUTROPHILS NFR BLD AUTO: 8.84 10*3/MM3 (ref 1.7–7)
NEUTROPHILS NFR BLD AUTO: 82.8 % (ref 42.7–76)
NRBC BLD AUTO-RTO: 0 /100 WBC (ref 0–0.2)
PHOSPHATE SERPL-MCNC: 1.7 MG/DL (ref 2.5–4.5)
PLATELET # BLD AUTO: 246 10*3/MM3 (ref 140–450)
PMV BLD AUTO: 9.2 FL (ref 6–12)
POTASSIUM SERPL-SCNC: 3.9 MMOL/L (ref 3.5–4.7)
PROT SERPL-MCNC: 5 G/DL (ref 6.3–8)
RBC # BLD AUTO: 3.89 10*6/MM3 (ref 3.77–5.28)
SODIUM SERPL-SCNC: 138 MMOL/L (ref 134–145)
WBC NRBC COR # BLD: 10.69 10*3/MM3 (ref 3.4–10.8)

## 2023-01-31 PROCEDURE — 99214 OFFICE O/P EST MOD 30 MIN: CPT | Performed by: INTERNAL MEDICINE

## 2023-01-31 PROCEDURE — 85025 COMPLETE CBC W/AUTO DIFF WBC: CPT

## 2023-01-31 PROCEDURE — 80053 COMPREHEN METABOLIC PANEL: CPT

## 2023-01-31 PROCEDURE — 83735 ASSAY OF MAGNESIUM: CPT

## 2023-01-31 PROCEDURE — 25010000002 HEPARIN LOCK FLUSH PER 10 UNITS: Performed by: INTERNAL MEDICINE

## 2023-01-31 PROCEDURE — 96413 CHEMO IV INFUSION 1 HR: CPT

## 2023-01-31 PROCEDURE — 25010000002 DOCETAXEL 20 MG/ML SOLUTION 8 ML VIAL: Performed by: INTERNAL MEDICINE

## 2023-01-31 PROCEDURE — 25010000002 GRANISETRON PER 100 MCG: Performed by: INTERNAL MEDICINE

## 2023-01-31 PROCEDURE — 96375 TX/PRO/DX INJ NEW DRUG ADDON: CPT

## 2023-01-31 PROCEDURE — 84100 ASSAY OF PHOSPHORUS: CPT

## 2023-01-31 PROCEDURE — 25010000002 DIPHENHYDRAMINE PER 50 MG: Performed by: INTERNAL MEDICINE

## 2023-01-31 RX ORDER — HEPARIN SODIUM (PORCINE) LOCK FLUSH IV SOLN 100 UNIT/ML 100 UNIT/ML
500 SOLUTION INTRAVENOUS AS NEEDED
Status: CANCELLED | OUTPATIENT
Start: 2023-01-31

## 2023-01-31 RX ORDER — FAMOTIDINE 10 MG/ML
20 INJECTION, SOLUTION INTRAVENOUS ONCE
Status: CANCELLED | OUTPATIENT
Start: 2023-01-31

## 2023-01-31 RX ORDER — SODIUM CHLORIDE 9 MG/ML
250 INJECTION, SOLUTION INTRAVENOUS ONCE
Status: COMPLETED | OUTPATIENT
Start: 2023-01-31 | End: 2023-01-31

## 2023-01-31 RX ORDER — FAMOTIDINE 10 MG/ML
20 INJECTION, SOLUTION INTRAVENOUS AS NEEDED
Status: CANCELLED | OUTPATIENT
Start: 2023-01-31

## 2023-01-31 RX ORDER — SODIUM CHLORIDE 0.9 % (FLUSH) 0.9 %
10 SYRINGE (ML) INJECTION AS NEEDED
Status: DISCONTINUED | OUTPATIENT
Start: 2023-01-31 | End: 2023-01-31 | Stop reason: HOSPADM

## 2023-01-31 RX ORDER — SODIUM CHLORIDE 9 MG/ML
250 INJECTION, SOLUTION INTRAVENOUS ONCE
Status: CANCELLED | OUTPATIENT
Start: 2023-01-31

## 2023-01-31 RX ORDER — HEPARIN SODIUM (PORCINE) LOCK FLUSH IV SOLN 100 UNIT/ML 100 UNIT/ML
500 SOLUTION INTRAVENOUS AS NEEDED
Status: DISCONTINUED | OUTPATIENT
Start: 2023-01-31 | End: 2023-01-31 | Stop reason: HOSPADM

## 2023-01-31 RX ORDER — GRANISETRON HYDROCHLORIDE 1 MG/ML
1 INJECTION INTRAVENOUS ONCE
Status: COMPLETED | OUTPATIENT
Start: 2023-01-31 | End: 2023-01-31

## 2023-01-31 RX ORDER — SODIUM CHLORIDE 0.9 % (FLUSH) 0.9 %
10 SYRINGE (ML) INJECTION AS NEEDED
Status: CANCELLED | OUTPATIENT
Start: 2023-01-31

## 2023-01-31 RX ORDER — DIPHENHYDRAMINE HYDROCHLORIDE 50 MG/ML
50 INJECTION INTRAMUSCULAR; INTRAVENOUS AS NEEDED
Status: CANCELLED | OUTPATIENT
Start: 2023-01-31

## 2023-01-31 RX ORDER — FAMOTIDINE 10 MG/ML
20 INJECTION, SOLUTION INTRAVENOUS ONCE
Status: COMPLETED | OUTPATIENT
Start: 2023-01-31 | End: 2023-01-31

## 2023-01-31 RX ADMIN — DOCETAXEL 95 MG: 20 INJECTION, SOLUTION, CONCENTRATE INTRAVENOUS at 14:20

## 2023-01-31 RX ADMIN — FAMOTIDINE 20 MG: 10 INJECTION INTRAVENOUS at 13:21

## 2023-01-31 RX ADMIN — GRANISETRON HYDROCHLORIDE 1 MG: 1 INJECTION INTRAVENOUS at 13:19

## 2023-01-31 RX ADMIN — Medication 10 ML: at 15:21

## 2023-01-31 RX ADMIN — DIPHENHYDRAMINE HYDROCHLORIDE 25 MG: 50 INJECTION, SOLUTION INTRAMUSCULAR; INTRAVENOUS at 13:23

## 2023-01-31 RX ADMIN — SODIUM CHLORIDE 250 ML: 9 INJECTION, SOLUTION INTRAVENOUS at 13:19

## 2023-01-31 RX ADMIN — Medication 500 UNITS: at 15:21

## 2023-01-31 NOTE — PROGRESS NOTES
[facility] GROUP INPATIENT PROGRESS NOTE    Length of Stay:  0 days    CHIEF COMPLAINT/REASON FOR VISIT:  Metastatic non-small cell lung cancer  Bilateral breast cancer    Interval history  Elizabeth presents to the clinic today for follow-up.  The pain in her back has not changed significantly.  Nausea and vomiting is poorly controlled.  She has had persistent nausea vomiting over the past 3 days.  She has thrown up at least once every day.  Jelani has been rotating Zofran and Compazine to help control the nausea.  She also took the dexamethasone yesterday but did have vomiting yesterday.  No other complaints at this time.  I discussed with Dr. العلي with radiation oncology regarding radiation to the brain.  She recommends proceeding with chemotherapy first followed by a repeat MRI in 8 weeks to determine radiation.    Oncologic history  Ms. 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     3/1/2021-bilateral screening mammogram-microcalcifications seen in the posterior one third of the lateral aspect of the right breast.  Area of focal asymmetry seen in the middle one third of the upper inner quadrant of the left breast.     3/1/2021-DEXA scan-T score of -2.2 on the lumbar spine and T score of -2.3 in the left hip and T score of -1.9 in the right hip.  Findings consistent with osteopenia     3/23/2021-screening lung CT-there is a 10 x 11 mm solid nodule in the left upper lobe.  Enlarged AP window lymph node measuring 14 x 10 mm.  Suggestion of a possible 9 mm left hilar lymph node.  Heavy coronary artery calcification.     3/23/2021-diagnostic mammogram bilateral-cluster of microcalcifications in the middle third lateral aspect of the right breast.  Left breast demonstrates persistence of the area of focal asymmetry in the region.     Ultrasound-left breast ultrasound at 10 o'clock position, 6 cm from the nipple there is a  0.4 cm irregular hypoechoic lesion.  Stereotactic biopsy of the right breast calcifications recommended.  Ultrasound-guided biopsy of the left breast lesion recommended     4/7/2021-right breast stereotactic biopsy and left breast ultrasound-guided biopsy  Pathology  Right breast-invasive ductal carcinoma, grade 2, lymphovascular invasion present, ER +99% strong, CT +80% moderate, HER-2 negative, Ki-67 12%  Left breast upper inner quadrant 10 o'clock position biopsy, invasive ductal carcinoma, grade   2, ER +99% strong, CT +40% strong, HER-2 2+ on immunohistochemistry, nonamplified on FISH Ki-67 10%     4/14/2021-PET/CT-FDG avid aortopulmonary window lymph node measures 0.9 cm with an SUV of 7.5.  FDG avid left hilar lymph node measures 1 cm with an SUV of 17.2.  Irregular soft tissue density in the right breast measuring 3.7 x 3.8 cm is favored to be secondary to the recent breast biopsy.  1.1 cm pulmonary nodule within the left upper lobe with SUV 9.7 .  Sub-6 mm pulmonary nodules are present in the right lower lobe.  No evidence of lymphadenopathy or metastatic disease in the abdomen.  Focal area of FDG uptake within the central canal posterior to T11-T12 displaced demonstrating an SUV of 5.5 thought to be reactive however MRI is recommended for further evaluation.     She was seen by Dr. Molina who initially planned for breast surgery however  due to the PET abnormalities she has been referred to Dr. Kerr who plans to do a wound on 4/30/2021.  Dr. Molina's and Dr. Kerr's notes reviewed.     Patient denies any family history of breast cancer.  Her mother had lymphoma.  Maternal grandmother had colon cancer.  Prior to that screening mammogram she did not have any palpable abnormalities of either breast.     She has been a heavy smoker for about 40 years and smoked 2 packs/day.  Denies any recent weight changes, new bone pains, cough, abdominal pain nausea vomiting constipation or diarrhea.  She does have  anxiety and has been on several medications.  She has recently been started on Xanax to help with the mood and also with insomnia.     Patient had a video-assisted thoracoscopy and mediastinal lymphadenectomy on 4/30/2021.  L5-L6 lymph nodes were biopsied however the small pulmonary nodule in the left upper lobe was not difficult to find.  Pathology showed moderately differentiated adenocarcinoma which is CK7 and TTF-1 positive.  Consistent with lung primary.  Ki-67 85%.     5/14/2021-MRI of the brain-minimal chronic small vessel ischemic change in the white matter.  Otherwise negative MRI.     5/20/2021-bilateral axillary ultrasound-no evidence of axillary lymphadenopathy in either axilla.  Normal-appearing bilateral axillary lymph nodes visualized.     Cycle 1 cisplatin and Alimta on 5/25/2021.     Cycle 2 cisplatin Alimta 6/15/2021     Cycle 3 cisplatin Alimta 7/7/2021     Completed radiation on 7/12/2021     Port was nonfunctional hence a port study was performed which showed that the port was backed up into the innominate vein and also there was a nonocclusive thrombus at the end of the catheter extending into the superior vena cava.  She was started on anticoagulation with Eliquis.  It was recommended for port revision of the intention to keep the port.     PET/CT 8/5/2021-images independently reviewed and interpreted by me-decrease in size and FDG uptake of the left upper lobe pulmonary nodule as well as mediastinal and left hilar lymphadenopathy representing response to treatment.  Sub-6 mm pulmonary nodule in the left upper lobe new since 4/14/2021.  This could be related to radiation however follow-up CT in 3 months recommended.  Decrease in size of the irregular masslike tissue in the right breast which is postbiopsy hematoma.  This is not PET avid.  New segmental left eighth rib fractures healing.  A new band of sclerosis of the left seventh rib which favored to represent healing nondisplaced fracture.   Follow-up CT recommended.  focal uptake in the duodenum first and second portions.  Could be related to duodenitis.  Indeterminate lesion in the L1 vertebral body not well evaluated on PET/CT.     10/1/2021-MRI of the lumbar spine.  Lesion in the left posterior body of L1 measures 14 x 14 x 12 mm and previously 5 x 5 x 6 mm.  The interval enlargement strongly suggest that it is metastatic lesion.  No additional osseous metastasis noted.   Other chronic changes noted.     10/14/2021-biopsy of the lumbar spine lesion-pathology consistent with poorly differentiated carcinoma.  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     10/26/2021-brain MRI-no evidence of metastatic disease.     10/26/2021-bilateral diagnostic mammogram and ultrasound  Scattered fibroglandular density in both breast.  Postbiopsy hematoma with internal biopsy clip in the upper outer quadrant of the right breast, 8 cm from the nipple.  The hematoma size is decreased to 2.9 cm from 3.6 cm earlier.     Left breast-parenchymal density measuring 9 x 10 mm has markedly decreased.  Few adjacent calcifications which are unchanged.  No new abnormality in the left breast.  At 10:00 region there is some minimal adjacent hypoechoic texture which is difficult to measure but appears smaller since the previous exam.     10/28/2021-PET/CT  New L1 and L2 lytic bone metastasis annually intensely hypermetabolic focus at the left adrenal gland likely represents metastatic disease as well.  Slight decrease in size of the 0.9 x 0.7 cm left upper lobe pulmonary nodule.  There is hypermetabolic activity related to radiation pneumonitis.  The remainder of the nodule is photopenic.  Uncertain etiology of the more intense activity along the right lateral peripheral margin of the 2.6 cm postbiopsy density of the right breast.     She completed radiation to to the lumbar spine on  11/19/2021 11/22/2021-cycle 1 Alimta and Keytruda     3/23/2022-bilateral diagnostic mammogram and ultrasound  Complete resolution of the microcalcifications in the upper outer quadrant of the right breast and decrease in size of the postbiopsy hematoma.  No suspicious abnormalities in the right breast.  Benign-appearing calcifications at the site of malignancy in the left breast upper inner quadrant.  No other suspicious findings.     4/11/2022 PET/CT-there is new groundglass opacities in the left upper lobe which are most likely postradiation changes.  Other groundglass opacities in the left lung as well as the right lung remained stable.  Increased nodularity of the left adrenal gland with an SUV of 5.6, previously 3.5.    Increased uptake in the L1 vertebra in the right posterior aspect with an SUV of 3.6.  Left L1 sclerosis increased     Due to this the case was discussed in multidisciplinary conference.  The disease progression was significant in the left adrenal gland as well as the L1 vertebra.  Since there were only 2 areas of disease progression it has been decided to proceed with radiation to these 2 spots and continue on Keytruda and Alimta.     Patient also has had worsening of her kidney function.  We initially held treatment as of 5/31/2022 and creatinine bumped up to 2.0, BUN 23.  We then resumed therapy 6/11/2022 with Keytruda alone.  Patient was referred to nephrology.     6/6/2022-MRI of the spine-diffuse marrow replacement in the L1 vertebral body and inferior endplate concavity.  Suspicious for metastatic disease with pathological fracture in the inferior endplate.  Also diffuse signal abnormality in the L2 lamina on the left suspicious for additional metastatic disease.  Abnormality of the first sacral segment suspicious for metastatic disease.  Left adrenal gland appears enlarged.  30 to 40% height loss and L2 vertebral body suggestive of a subacute compression fracture.  Degenerative  changes.     7/18/2022-PET/CT  Multiple hypermetabolic osseous lesions with index lesions which have either increased in degree of FDG uptake or newly hypermetabolic lesions representative of metastatic disease and progression of disease.  Possible FDG avid lesion in the left femoral diaphysis however these are incompletely visualized and in the area of artifactual FDG uptake.  MRI of the left femur recommended for further evaluation.  Increasing FDG uptake of the left adrenal gland.  Focal left hilar hypermetabolic him corresponds to the lymph node which has minimally increased in size compared to April 2022.  New sub-6 mm pulmonary nodules.     7/26/2022-cycle 1 of Taxotere     Patient was admitted to the hospital 8/3/2022 discharged on 8/9/2022.  She was admitted for strokelike symptoms and confusion.  The confusion was thought to be secondary to polypharmacy and probably febrile neutropenia.  She was treated with antibiotics.  Mental status improved subsequently.  MRI of the cervical thoracic and lumbar spine was performed on 8/4/2022.  Images independently reviewed by me.  Multiple metastatic lesions in the spine noted.  There was a lesion on the CT which was concerning.  There is also a sacral Lesion measuring up to 5 cm in transverse dimension on the right.  Patient was symptomatic with pain on the right side of the sacrum.  Radiation oncology recommended radiation to the cervical spine as well as sacrum.  CT head without any obvious abnormalities.     Completed radiation to the cervical spine and sacrum on 8/12/2022    She was admitted to the hospital between 9/23/2022 to 10/12/2022.  She was admitted for poorly controlled pain as well as encephalopathy and multiple falls.  She was diagnosed with a UTI and treated with IV Rocephin.  Pain medications changed and also additional radiation to the right sacrum was performed.  3000 cGy in 10 fractions was administered.  She was discharged on oxycodone 15 mg every  4 hours as needed for pain and OxyContin twice daily.  She was recommended to go to a subacute rehab however she preferred to be discharged home with home physical therapy and Occupational Therapy    9/24/2022-MRI of the pelvis showed osseous metastatic disease in the sacrum worst within the right and probable pathological fractures bilaterally.    10/4/2022 CT head without any evidence of metastatic disease or acute abnormalities.    10/25/2022-PET/CT-resolution of hypermetabolic activity in the upper lobe and left hilum.  Previously noted new tiny pulmonary nodules have resolved.  Resolution of hypermetabolic activity in the left adrenal gland.  Near complete resolution of hypermetabolic activity in the skeletal metastasis particularly the cervical spine, pelvic bones and proximal left femur.  Maximal SUV at the right sacral metastasis is 2.7 and previously it was 8.9.  Tiny focus of hypermetabolic activity at the GE junction with a maximal SUV of 4.7.  No definite thickening or abnormality noted on the CT.    11/15/2022-lumbar puncture with negative cytology of the CSF.    11/21/2022-MRI of the brain with contrast and MRI of the cervical spine with contrast without any evidence of metastatic disease.    1/12/2023-PET/CT  Hypermetabolic mediastinal lymphadenopathy and multiple liver lesions which are hypermetabolic suggestive of metastasis.  New tiny left pleural effusion is noted.  Progression of bilateral sacral fractures and right L5 transverse process fracture.  There is also an acute/subacute L1 fracture.  Lumbar spine x-rays have been recommended for evaluation of the vertebral height or lumbar MRI to be compared to the priors.    1/23/2023-MRI of the brain  At least 9 separate tiny 2 to 6 mm enhancing lesions in the brain compatible with multiple new brain metastasis.  There has been interval development of 3 separate foci of encephalomalacia concerning for chronic infarcts but these are new since August  2022.  Stable osseous metastasis at C2.    PHYSICAL EXAMINATION:    General: Oriented to person, place, and time.  Ill-appearing.  Pale.  HEENT: Periorbital edema has resolved.  Chest/Lungs: Clear to auscultation bilaterally. Right chest mediport in place  Heart: RRR  Abdomen/GI: soft, NT, ND, NABS  Extremities: Bilateral lower extremity without any swelling.  Decreased strength in both lower extremities.  No focal deficits.    I have reexamined the patient and the results are consistent with the previously documented exam. Salma Snell MD     DIAGNOSTIC DATA:  Results Review:     I reviewed the patient's new clinical results.    Results from last 7 days   Lab Units 01/31/23  1157   WBC 10*3/mm3 10.69   HEMOGLOBIN g/dL 11.3*   HEMATOCRIT % 35.8   PLATELETS 10*3/mm3 246     Lab Results   Component Value Date    NEUTROABS 8.84 (H) 01/31/2023                     IMAGING:      PET/CT 1/12/2023 and MRI of the brain 1/23/2023-images independently reviewed and interpreted by me.    ASSESSMENT:  This is a 57 y.o. female with:     *Metastatic non-small cell lung cancer  · She was initially diagnosed with stage III (T1N2M0) disease.    · She received initial therapy with cisplatin and Alimta concurrent with radiation therapy beginning 5/25/2021.    · There were indeterminate findings in the lumbar spine at L1 and it was recommended to monitor this over time.    · Patient completed 3 cycles cisplatin and Alimta 7/7/2021 and completed radiation therapy 7/12/2021.    · PET scan 8/6/2021 showed good response to treatment.    · MRI lumbar spine 10/1/2021 with increase in size of the L1 lesion.    · Biopsy of the L1 lesion on 10/14/2022 confirmed metastatic adenocarcinoma of lung origin, PD-L1 TPS 0.    · PET scan 10/26/2021 with involvement of left adrenal and L1/L2 only.    · Patient received radiation therapy to L1/L2 on 11/19/2021.  She also received radiation to the left adrenal gland.    · She initiated further systemic  therapy on 11/22/2021 with Keytruda and Alimta.   · Follow-up PET scan 1/18/2022 with decrease in small left upper lobe pulmonary nodule, resolution of activity at L1/L2, no new sites of disease.    · Guardant 360 CT DNA level was monitored and was decreasing.    · PET scan 4/11/2022 with progression in left adrenal and L1.    · Maintained systemic therapy with Keytruda and Alimta and received additional radiation to left adrenal and L1.  Alimta however held since 5/10/2022 due to renal dysfunction.  Radiation completed to lumbar spine 7/14/2022.    · PET scan 7/18/2022 with multiple areas of progression of the spine and right sacrum.    · Guardant 360 analysis 7/20/2022 with no actionable mutations.    · Change in therapy to single agent palliative Taxotere initiated 7/26/2022.    · Altered mental status and febrile neutropenia requiring hospitalization 8/2 - 8/9/2022.  During that admission, MRI brain, cervical, thoracic, lumbar spine performed 8/4/2022 in addition to CT cervical spine 8/5/2022.  There was evidence of C2 metastatic lesion with some dural enhancement, compression deformity at T7 with mild edema suggesting acute to subacute fracture, 5 mm T12 spinous process metastasis, multifocal disease in the lumbar spine and sacrum, largest involving sacral ala to the right 5 cm in transverse dimension.  Loss of height at L2 25%.  · Patient received cycle 3 Taxotere 9/6/2022 with Neulasta support.    · She has been continuing as well on Xgeva every 4 weeks (last received 9/6/2022).  · Patient did undergo MRI lumbar spine and pelvis on 9/24/2022.  MRI pelvis showed bilateral sacral fractures with marrow infiltration related to metastasis more prominent on the right.  Lesion on the right 3.5 x 4.1 x 4.3 cm.  Also probable metastasis along posterior acetabulum on the right 1.5 cm.  MRI lumbar spine with stable L1 metastasis, new edema endplate T12 possibly stress reaction versus developing new metastasis, lesion at  T12 spinous process unchanged.   · Radiation oncology consulted with plans for palliative treatment of the right sacral metastasis.  Treatment initiated 9/28/2022  · Completed radiation to the right sacral metastasis on 10/11/2022.  · PET/CT 10/25/2022 without any metabolic uptake in the lung or the skeletal metastasis.  This is indicative of a positive response to Taxotere.  · Pain persistent sitting and walking position and not present when she is laying down.  · Patient is still very active and performance status is very poor.  Due to this reason chemotherapy will be continued to be held.  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  · Due to this reason an MRI of the brain was obtained but unfortunately this was performed without contrast.  Reviewed the MRI and no evidence of metastatic disease.  · 11/15/2022 lumbar puncture shows no evidence of malignancy in the CSF, elevated protein at 65.2, glucose normal at 58, culture no growth in 3 days  · Repeat MRI of the brain with contrast 11/21/2022 does not show any obvious abnormalities.  There is no enhancement of the CSF.  MRI of the cervical spine with no abnormalities.  · Patient unable to undergo MRI of the thoracic and lumbar spine due to mental status and unable to being in 1 position as well as with incontinence.  · 1/4/2023-mental status has improved significantly.  Patient reports decreased oral intake and nausea and vomiting.  She is also reporting abdominal discomfort.  · 1/12/2023-PET/CT with mediastinal lymphadenopathy as well as multiple liver lesions suggestive of disease progression.  · 1/23/2023-MRI of the brain with at least 9 different foci of small mets measuring up to 6 mm.  There is also areas of encephalomalacia concerning for chronic infarcts.    · In the setting of disease progression we discussed resuming Taxotere.  She does have a fairly poor functional status  however  · She does have a fairly poor functional status however mental status and ability to ambulate with the help of walker seem to be improving.  · We discussed at length the pros and cons of chemotherapy.  · In the absence of chemotherapy I see that the disease will progress and eventually her functional status will decline further.  · I did explain the risk associated with chemotherapy in someone who has a poor functional status.  · Patient and her  verbalized understanding and willing to proceed with Taxotere.  · We will proceed with a 20% dose reduction     *History of bilateral stage I breast cancer  · Patient with bilateral breast cancer, both stage I ([id number]), grade 2, ER/[place] positive and HER2/melinda negative with low Ki-67.    · Patient initiated adjuvant letrozole in May 2021.  · Continue letrozole    *Mental status changes  · Patient's mental status is back at baseline.  · Significant improvement  · Currently off all psychotropic medications.  · Mental status at baseline.     *Cancer related pain  · Patient has been receiving Duragesic patch 50 mcg every 72 hours in addition to oxycodone 15 mg every 4 hours for breakthrough pain.  Duragesic dose had been escalated recently in the outpatient setting due to worsening pain  · On admission 9/23/2022, Duragesic patch increased to 75 mcg daily, added Decadron 4 mg IV every 6 hours with continuation of oxycodone 15 mg every 4 hours as needed for breakthrough pain in addition of Dilaudid 1 mg every 2 hours as needed for breakthrough pain.  · Patient with increasing side effects from narcotics, Duragesic decreased on 9/25/2022 from 75 down to 50 mcg patch every 72 hours.  · Dexamethasone dose decreased to 4 mg p.o. every 12 hours on 9/27/2022  · Initiated palliative radiation to the right sacrum on 9/28/2022  · Patient became confused after receiving Dilaudid.  Patient and family asked to discontinue Dilaudid.  · Duragesic patch dose was increased to 75  mcg/h on 9/30/2022. Subsequent decrease to 50 mcg due to confusion  · Gabapentin was discontinued due to the sedating effect.  · Oxycodone is being used for breakthrough pain.  · 10/3 transition to sustained release oxycodone 40 mg bid and fentanyl patch stopped  · Poorly controlled, pain particularly worse in sitting and standing position.  · Unsure if extends is contributing to mental status changes as previously patient had been very sensitive to pain meds.  · Due to this reason we will discontinue the Xtampza ER and switch to OxyContin 20 mg twice daily.  · She continues to experience severe mental status changes.  · Long-acting pain medications discontinued due to mental status changes and she is currently on oxycodone 15 mg as needed.  · Reports better pain control after the epidural injection of the spine.  · Continue oxycodone as needed.      *Anxiety/depression  · She has been weaned off of Cymbalta as well as Wellbutrin.  · Mood stable.  Continue the same     *Insomnia  · She has been receiving Xanax as needed  · Continue the same     *Anemia  · Hemoglobin stable at 11.3.     *Peripheral neuropathy  · Patient was on gabapentin 300 mg twice daily.  · Gabapentin was discontinued on 9/30/2022.  · Neuropathy stable.     *Nausea vomiting and abdominal discomfort  · Continues to experience vomiting.  · Abdominal discomfort has resolved  · PET/CT with liver metastasis.  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  · Continue Compazine as well as Zofran.  They are currently alternating.  · We will also get Sancuso patch approved.  · Nausea and vomiting poorly controlled.     *GI prophylaxis  · Continue omeprazole daily    *Deconditioning  · Gradually improving.  · Recommend physical therapy.    *Hypokalemia  · Potassium normal at 3.9  · Decrease potassium replacement to 20 mEq daily    RECOMMENDATIONS:  1. Taxotere today  2. Discussed with Dr. Carreno who recommends repeat MRI in 8 weeks and radiation  following chemo  3. Decrease potassium to 20 mEq daily  4. Continue Compazine and Zofran for nausea  5. Start Sancuso    Patient is on treatment requiring close monitoring for toxicities.  Discussed with Dr. العلي.  40 minutes spent on the encounter.      Salma Snell MD

## 2023-01-31 NOTE — NURSING NOTE
Pt's Calcium 8.0 today. Instructed to start a Calcium supplement daily.   Per Dr Snell pt to decrease Potassium to 20 meq daily instead of BID.  Phosphorus 1.7, Pt advised and will increase her milk intake.   Pt and spouse v/u.  Lab Results   Component Value Date    WBC 10.69 01/31/2023    HGB 11.3 (L) 01/31/2023    HCT 35.8 01/31/2023    MCV 92.0 01/31/2023     01/31/2023     Lab Results   Component Value Date    GLUCOSE 124 01/31/2023    BUN 6 01/31/2023    CREATININE 0.88 01/31/2023    EGFRIFNONA 46 (L) 02/14/2022    EGFRIFAFRI 95 11/24/2020    BCR 6.8 (L) 01/31/2023    K 3.9 01/31/2023    CO2 19.7 (L) 01/31/2023    CALCIUM 8.0 (L) 01/31/2023    PROTENTOTREF 6.7 08/02/2022    ALBUMIN 2.5 (L) 01/31/2023    LABIL2 0.8 08/02/2022    AST 23 01/31/2023    ALT 9 01/31/2023        Phosphorus                                    1.7       Mag                                                 1.9

## 2023-01-31 NOTE — PATIENT INSTRUCTIONS
Please decrease your Potassium to 20 meq daily.  Your Calcium is low, please  start a Calcium with Vitamin D supplement daily.  Your Phosphorus is low, try to increase your milk intake.

## 2023-02-01 ENCOUNTER — INFUSION (OUTPATIENT)
Dept: ONCOLOGY | Facility: HOSPITAL | Age: 58
End: 2023-02-01
Payer: COMMERCIAL

## 2023-02-01 DIAGNOSIS — C79.51 NON-SMALL CELL LUNG CANCER METASTATIC TO BONE: Primary | ICD-10-CM

## 2023-02-01 DIAGNOSIS — C34.90 NON-SMALL CELL LUNG CANCER METASTATIC TO BONE: Primary | ICD-10-CM

## 2023-02-01 PROCEDURE — 96372 THER/PROPH/DIAG INJ SC/IM: CPT

## 2023-02-01 PROCEDURE — 25010000002 PEGFILGRASTIM-CBQV 6 MG/0.6ML SOLUTION PREFILLED SYRINGE: Performed by: INTERNAL MEDICINE

## 2023-02-01 RX ADMIN — PEGFILGRASTIM-CBQV 6 MG: 6 INJECTION, SOLUTION SUBCUTANEOUS at 13:29

## 2023-02-02 ENCOUNTER — DOCUMENTATION (OUTPATIENT)
Dept: PHARMACY | Facility: HOSPITAL | Age: 58
End: 2023-02-02
Payer: COMMERCIAL

## 2023-02-02 ENCOUNTER — TELEPHONE (OUTPATIENT)
Dept: ONCOLOGY | Facility: CLINIC | Age: 58
End: 2023-02-02
Payer: COMMERCIAL

## 2023-02-02 RX ORDER — DEXAMETHASONE 2 MG/1
2 TABLET ORAL DAILY
Qty: 30 TABLET | Refills: 3 | Status: SHIPPED | OUTPATIENT
Start: 2023-02-02

## 2023-02-02 NOTE — PROGRESS NOTES
Staff message rec from Brooklynn DE SANTIAGO Clinical RN-Pt was prescribed Sancuso and her  called reporting the medication is not covered by insurance and the cost is $3000. She was asking about samples and PA.    The medication actually needs a PA with her [employer] insurance. I submitted this to [employer] through [employer]. The request rec immediate approval.     I ran a test claim and it returned a copay cost of $50 for 4 patches.    Information relayed to Brooklynn.    Brooklynn Powell RN sent to [facility]  We sent in sancuso for her yesterday, Jelani called and said it wasn't covered by insurance and cash price was $3000!!!!!!  Do we have samples??? Or maybe this needs a PA?     Silva Tim  Specialty Pharmacy Technician

## 2023-02-02 NOTE — TELEPHONE ENCOUNTER
Call received from Jelani yesterday regarding sancuso patches, states pharmacy said this was not covered by insurance and the cost was $3000. I contacted [facility] ROY in our pharmacy dept.  This appeared to just need a PA. Reviewed this also with Dr Snell as he reports she is still having a lot of vomiting.  She recommends she take her omeprazole twice daily and will also send in a low dose steroid to take daily.  She recommends she take this at noon/lunchtime.

## 2023-02-02 NOTE — PROGRESS NOTES
"I contacted pt local [facility] to check on the Sancuso rx. The technican explained to me that he ran it through this morning and \"it is not covered\". I explained to him that I have an approval on file with [employer] good until 2025 and per our system test claim the copay should be $50.    I placed me on hold and came back to the line stating it rejected as not covered and he will send me the PA request. I informed him again it is already approved but he has to bill [employer] not [employer].    He placed me on hold to talk with the pharmacist and he returned the the line once again with a rejection.     I provided him the Authorization Approval number and suggested they call the Pharmacy Help Desk because our system is not getting any rejection with [employer].    I spoke with Brooklynn and suggested for her to send the rx to [facility] if pts  is agreeable-that way she can get on therapy ASAP.       Silva Tim  Specialty Pharmacy Technician      "

## 2023-02-03 ENCOUNTER — DOCUMENTATION (OUTPATIENT)
Dept: PHARMACY | Facility: HOSPITAL | Age: 58
End: 2023-02-03
Payer: COMMERCIAL

## 2023-02-03 NOTE — PROGRESS NOTES
Brooklynn DE SANTIAGO, Clinical RN brought me a fax from [facility]-it is a rejection for the Sancuso patches stating PA needed through [employer].    [employer] HAS approved the medication for 4 patches per 28 days.    I contacted [facility] and spoke to Lucio-he states he spoke with pts  yesterday because the medication had to be ordered and he will come pick it up today.    Silva Tim  Specialty Pharmacy Technician

## 2023-02-06 DIAGNOSIS — C34.90 NON-SMALL CELL LUNG CANCER METASTATIC TO BONE: ICD-10-CM

## 2023-02-06 DIAGNOSIS — C79.51 NON-SMALL CELL LUNG CANCER METASTATIC TO BONE: ICD-10-CM

## 2023-02-06 RX ORDER — OXYCODONE HYDROCHLORIDE 15 MG/1
15 TABLET ORAL EVERY 4 HOURS PRN
Qty: 180 TABLET | Refills: 0 | Status: SHIPPED | OUTPATIENT
Start: 2023-02-06 | End: 2023-03-14 | Stop reason: SDUPTHER

## 2023-02-07 ENCOUNTER — INFUSION (OUTPATIENT)
Dept: ONCOLOGY | Facility: HOSPITAL | Age: 58
End: 2023-02-07
Payer: COMMERCIAL

## 2023-02-07 ENCOUNTER — OFFICE VISIT (OUTPATIENT)
Dept: ONCOLOGY | Facility: CLINIC | Age: 58
End: 2023-02-07
Payer: COMMERCIAL

## 2023-02-07 ENCOUNTER — PREP FOR SURGERY (OUTPATIENT)
Dept: SURGERY | Facility: SURGERY CENTER | Age: 58
End: 2023-02-07
Payer: COMMERCIAL

## 2023-02-07 ENCOUNTER — TRANSCRIBE ORDERS (OUTPATIENT)
Dept: SURGERY | Facility: SURGERY CENTER | Age: 58
End: 2023-02-07
Payer: COMMERCIAL

## 2023-02-07 ENCOUNTER — OFFICE VISIT (OUTPATIENT)
Dept: PAIN MEDICINE | Facility: CLINIC | Age: 58
End: 2023-02-07
Payer: COMMERCIAL

## 2023-02-07 VITALS
HEART RATE: 119 BPM | RESPIRATION RATE: 16 BRPM | DIASTOLIC BLOOD PRESSURE: 82 MMHG | HEIGHT: 63 IN | WEIGHT: 134.7 LBS | SYSTOLIC BLOOD PRESSURE: 132 MMHG | TEMPERATURE: 97.1 F | OXYGEN SATURATION: 98 % | BODY MASS INDEX: 23.87 KG/M2

## 2023-02-07 VITALS
HEART RATE: 117 BPM | TEMPERATURE: 99.8 F | SYSTOLIC BLOOD PRESSURE: 130 MMHG | BODY MASS INDEX: 23.92 KG/M2 | OXYGEN SATURATION: 92 % | HEIGHT: 63 IN | DIASTOLIC BLOOD PRESSURE: 91 MMHG | WEIGHT: 135 LBS

## 2023-02-07 DIAGNOSIS — Z41.9 SURGERY, ELECTIVE: Primary | ICD-10-CM

## 2023-02-07 DIAGNOSIS — M54.16 LUMBAR RADICULOPATHY: Primary | ICD-10-CM

## 2023-02-07 DIAGNOSIS — Z17.0 MALIGNANT NEOPLASM OF UPPER-OUTER QUADRANT OF RIGHT BREAST IN FEMALE, ESTROGEN RECEPTOR POSITIVE: Primary | ICD-10-CM

## 2023-02-07 DIAGNOSIS — C34.90 NON-SMALL CELL LUNG CANCER METASTATIC TO BONE: ICD-10-CM

## 2023-02-07 DIAGNOSIS — M48.061 LUMBAR FORAMINAL STENOSIS: ICD-10-CM

## 2023-02-07 DIAGNOSIS — Z45.2 ENCOUNTER FOR FITTING AND ADJUSTMENT OF VASCULAR CATHETER: Primary | ICD-10-CM

## 2023-02-07 DIAGNOSIS — C50.411 MALIGNANT NEOPLASM OF UPPER-OUTER QUADRANT OF RIGHT BREAST IN FEMALE, ESTROGEN RECEPTOR POSITIVE: Primary | ICD-10-CM

## 2023-02-07 DIAGNOSIS — C79.51 NON-SMALL CELL LUNG CANCER METASTATIC TO BONE: ICD-10-CM

## 2023-02-07 DIAGNOSIS — T45.1X5A CHEMOTHERAPY-INDUCED NAUSEA: ICD-10-CM

## 2023-02-07 DIAGNOSIS — R11.0 CHEMOTHERAPY-INDUCED NAUSEA: ICD-10-CM

## 2023-02-07 LAB
ALBUMIN SERPL-MCNC: 2.7 G/DL (ref 3.5–5.2)
ALBUMIN/GLOB SERPL: 1.2 G/DL (ref 1.1–2.4)
ALP SERPL-CCNC: 258 U/L (ref 38–116)
ALT SERPL W P-5'-P-CCNC: 12 U/L (ref 0–33)
ANION GAP SERPL CALCULATED.3IONS-SCNC: 13.6 MMOL/L (ref 5–15)
AST SERPL-CCNC: 21 U/L (ref 0–32)
BASOPHILS # BLD AUTO: 0.04 10*3/MM3 (ref 0–0.2)
BASOPHILS NFR BLD AUTO: 0.1 % (ref 0–1.5)
BILIRUB SERPL-MCNC: <0.2 MG/DL (ref 0.2–1.2)
BUN SERPL-MCNC: 9 MG/DL (ref 6–20)
BUN/CREAT SERPL: 8.1 (ref 7.3–30)
CALCIUM SPEC-SCNC: 8 MG/DL (ref 8.5–10.2)
CHLORIDE SERPL-SCNC: 105 MMOL/L (ref 98–107)
CO2 SERPL-SCNC: 20.4 MMOL/L (ref 22–29)
CREAT SERPL-MCNC: 1.11 MG/DL (ref 0.6–1.1)
DEPRECATED RDW RBC AUTO: 49.5 FL (ref 37–54)
EGFRCR SERPLBLD CKD-EPI 2021: 57.4 ML/MIN/1.73
EOSINOPHIL # BLD AUTO: 0.01 10*3/MM3 (ref 0–0.4)
EOSINOPHIL NFR BLD AUTO: 0 % (ref 0.3–6.2)
ERYTHROCYTE [DISTWIDTH] IN BLOOD BY AUTOMATED COUNT: 15 % (ref 12.3–15.4)
GLOBULIN UR ELPH-MCNC: 2.3 GM/DL (ref 1.8–3.5)
GLUCOSE SERPL-MCNC: 134 MG/DL (ref 74–124)
HCT VFR BLD AUTO: 29.6 % (ref 34–46.6)
HGB BLD-MCNC: 9.4 G/DL (ref 12–15.9)
IMM GRANULOCYTES # BLD AUTO: 1.4 10*3/MM3 (ref 0–0.05)
IMM GRANULOCYTES NFR BLD AUTO: 4 % (ref 0–0.5)
LYMPHOCYTES # BLD AUTO: 0.61 10*3/MM3 (ref 0.7–3.1)
LYMPHOCYTES NFR BLD AUTO: 1.8 % (ref 19.6–45.3)
MCH RBC QN AUTO: 28.6 PG (ref 26.6–33)
MCHC RBC AUTO-ENTMCNC: 31.8 G/DL (ref 31.5–35.7)
MCV RBC AUTO: 90 FL (ref 79–97)
MONOCYTES # BLD AUTO: 1.86 10*3/MM3 (ref 0.1–0.9)
MONOCYTES NFR BLD AUTO: 5.4 % (ref 5–12)
NEUTROPHILS NFR BLD AUTO: 30.78 10*3/MM3 (ref 1.7–7)
NEUTROPHILS NFR BLD AUTO: 88.7 % (ref 42.7–76)
NRBC BLD AUTO-RTO: 0.1 /100 WBC (ref 0–0.2)
PLATELET # BLD AUTO: 205 10*3/MM3 (ref 140–450)
PMV BLD AUTO: 10.4 FL (ref 6–12)
POTASSIUM SERPL-SCNC: 4.3 MMOL/L (ref 3.5–4.7)
PROT SERPL-MCNC: 5 G/DL (ref 6.3–8)
RBC # BLD AUTO: 3.29 10*6/MM3 (ref 3.77–5.28)
SODIUM SERPL-SCNC: 139 MMOL/L (ref 134–145)
WBC NRBC COR # BLD: 34.7 10*3/MM3 (ref 3.4–10.8)

## 2023-02-07 PROCEDURE — 99214 OFFICE O/P EST MOD 30 MIN: CPT | Performed by: NURSE PRACTITIONER

## 2023-02-07 PROCEDURE — 80053 COMPREHEN METABOLIC PANEL: CPT

## 2023-02-07 PROCEDURE — 99214 OFFICE O/P EST MOD 30 MIN: CPT | Performed by: ANESTHESIOLOGY

## 2023-02-07 PROCEDURE — 36591 DRAW BLOOD OFF VENOUS DEVICE: CPT

## 2023-02-07 PROCEDURE — 85025 COMPLETE CBC W/AUTO DIFF WBC: CPT

## 2023-02-07 PROCEDURE — 25010000002 HEPARIN LOCK FLUSH PER 10 UNITS: Performed by: INTERNAL MEDICINE

## 2023-02-07 RX ORDER — SODIUM CHLORIDE 0.9 % (FLUSH) 0.9 %
10 SYRINGE (ML) INJECTION EVERY 12 HOURS SCHEDULED
Status: CANCELLED | OUTPATIENT
Start: 2023-02-07

## 2023-02-07 RX ORDER — SODIUM CHLORIDE 0.9 % (FLUSH) 0.9 %
10 SYRINGE (ML) INJECTION AS NEEDED
Status: DISCONTINUED | OUTPATIENT
Start: 2023-02-07 | End: 2023-02-07 | Stop reason: HOSPADM

## 2023-02-07 RX ORDER — HEPARIN SODIUM (PORCINE) LOCK FLUSH IV SOLN 100 UNIT/ML 100 UNIT/ML
500 SOLUTION INTRAVENOUS AS NEEDED
Status: DISCONTINUED | OUTPATIENT
Start: 2023-02-07 | End: 2023-02-07 | Stop reason: HOSPADM

## 2023-02-07 RX ORDER — SODIUM CHLORIDE 0.9 % (FLUSH) 0.9 %
10 SYRINGE (ML) INJECTION AS NEEDED
Status: CANCELLED | OUTPATIENT
Start: 2023-02-07

## 2023-02-07 RX ORDER — POTASSIUM CHLORIDE 750 MG/1
20 CAPSULE, EXTENDED RELEASE ORAL DAILY
Qty: 60 CAPSULE | Refills: 1 | Status: SHIPPED | OUTPATIENT
Start: 2023-02-07 | End: 2023-03-09

## 2023-02-07 RX ORDER — HEPARIN SODIUM (PORCINE) LOCK FLUSH IV SOLN 100 UNIT/ML 100 UNIT/ML
500 SOLUTION INTRAVENOUS AS NEEDED
Status: CANCELLED | OUTPATIENT
Start: 2023-02-07

## 2023-02-07 RX ADMIN — Medication 500 UNITS: at 14:03

## 2023-02-07 RX ADMIN — Medication 10 ML: at 14:02

## 2023-02-07 NOTE — PROGRESS NOTES
[facility] INPATIENT PROGRESS NOTE    Length of Stay:  0 days    CHIEF COMPLAINT/REASON FOR VISIT:  Metastatic non-small cell lung cancer  Bilateral breast cancer    Interval history  Patient is a 57-year-old female with the above-mentioned history who is seen today 2/7/2023 for lab review and evaluation for toxicity check after she received her fourth cycle of Taxotere on 1/31/2023.  We brought her in today to see if she would need IV fluids.  Patient states she does not feel like she needs fluids today.  She feels like she is eating and drinking adequately.  She denies issues with diarrhea.  She states that the Sancuso patch has significantly helped with her nausea.  She has been seen by pain management earlier today and is scheduled to have an injection on Monday.  She denies vomiting.        Oncologic history  Ms. 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     3/1/2021-bilateral screening mammogram-microcalcifications seen in the posterior one third of the lateral aspect of the right breast.  Area of focal asymmetry seen in the middle one third of the upper inner quadrant of the left breast.     3/1/2021-DEXA scan-T score of -2.2 on the lumbar spine and T score of -2.3 in the left hip and T score of -1.9 in the right hip.  Findings consistent with osteopenia     3/23/2021-screening lung CT-there is a 10 x 11 mm solid nodule in the left upper lobe.  Enlarged AP window lymph node measuring 14 x 10 mm.  Suggestion of a possible 9 mm left hilar lymph node.  Heavy coronary artery calcification.     3/23/2021-diagnostic mammogram bilateral-cluster of microcalcifications in the middle third lateral aspect of the right breast.  Left breast demonstrates persistence of the area of focal asymmetry in the region.     Ultrasound-left breast ultrasound at 10 o'clock position, 6 cm from the nipple there is a 0.4 cm irregular  hypoechoic lesion.  Stereotactic biopsy of the right breast calcifications recommended.  Ultrasound-guided biopsy of the left breast lesion recommended     4/7/2021-right breast stereotactic biopsy and left breast ultrasound-guided biopsy  Pathology  Right breast-invasive ductal carcinoma, grade 2, lymphovascular invasion present, ER +99% strong, IA +80% moderate, HER-2 negative, Ki-67 12%  Left breast upper inner quadrant 10 o'clock position biopsy, invasive ductal carcinoma, grade   2, ER +99% strong, IA +40% strong, HER-2 2+ on immunohistochemistry, nonamplified on FISH Ki-67 10%     4/14/2021-PET/CT-FDG avid aortopulmonary window lymph node measures 0.9 cm with an SUV of 7.5.  FDG avid left hilar lymph node measures 1 cm with an SUV of 17.2.  Irregular soft tissue density in the right breast measuring 3.7 x 3.8 cm is favored to be secondary to the recent breast biopsy.  1.1 cm pulmonary nodule within the left upper lobe with SUV 9.7 .  Sub-6 mm pulmonary nodules are present in the right lower lobe.  No evidence of lymphadenopathy or metastatic disease in the abdomen.  Focal area of FDG uptake within the central canal posterior to T11-T12 displaced demonstrating an SUV of 5.5 thought to be reactive however MRI is recommended for further evaluation.     She was seen by Dr. Molina who initially planned for breast surgery however  due to the PET abnormalities she has been referred to Dr. Kerr who plans to do a wound on 4/30/2021.  Dr. Molina's and Dr. Kerr's notes reviewed.     Patient denies any family history of breast cancer.  Her mother had lymphoma.  Maternal grandmother had colon cancer.  Prior to that screening mammogram she did not have any palpable abnormalities of either breast.     She has been a heavy smoker for about 40 years and smoked 2 packs/day.  Denies any recent weight changes, new bone pains, cough, abdominal pain nausea vomiting constipation or diarrhea.  She does have anxiety and has been  on several medications.  She has recently been started on Xanax to help with the mood and also with insomnia.     Patient had a video-assisted thoracoscopy and mediastinal lymphadenectomy on 4/30/2021.  L5-L6 lymph nodes were biopsied however the small pulmonary nodule in the left upper lobe was not difficult to find.  Pathology showed moderately differentiated adenocarcinoma which is CK7 and TTF-1 positive.  Consistent with lung primary.  Ki-67 85%.     5/14/2021-MRI of the brain-minimal chronic small vessel ischemic change in the white matter.  Otherwise negative MRI.     5/20/2021-bilateral axillary ultrasound-no evidence of axillary lymphadenopathy in either axilla.  Normal-appearing bilateral axillary lymph nodes visualized.     Cycle 1 cisplatin and Alimta on 5/25/2021.     Cycle 2 cisplatin Alimta 6/15/2021     Cycle 3 cisplatin Alimta 7/7/2021     Completed radiation on 7/12/2021     Port was nonfunctional hence a port study was performed which showed that the port was backed up into the innominate vein and also there was a nonocclusive thrombus at the end of the catheter extending into the superior vena cava.  She was started on anticoagulation with Eliquis.  It was recommended for port revision of the intention to keep the port.     PET/CT 8/5/2021-images independently reviewed and interpreted by me-decrease in size and FDG uptake of the left upper lobe pulmonary nodule as well as mediastinal and left hilar lymphadenopathy representing response to treatment.  Sub-6 mm pulmonary nodule in the left upper lobe new since 4/14/2021.  This could be related to radiation however follow-up CT in 3 months recommended.  Decrease in size of the irregular masslike tissue in the right breast which is postbiopsy hematoma.  This is not PET avid.  New segmental left eighth rib fractures healing.  A new band of sclerosis of the left seventh rib which favored to represent healing nondisplaced fracture.  Follow-up CT  recommended.  focal uptake in the duodenum first and second portions.  Could be related to duodenitis.  Indeterminate lesion in the L1 vertebral body not well evaluated on PET/CT.     10/1/2021-MRI of the lumbar spine.  Lesion in the left posterior body of L1 measures 14 x 14 x 12 mm and previously 5 x 5 x 6 mm.  The interval enlargement strongly suggest that it is metastatic lesion.  No additional osseous metastasis noted.   Other chronic changes noted.     10/14/2021-biopsy of the lumbar spine lesion-pathology consistent with poorly differentiated carcinoma.  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     10/26/2021-brain MRI-no evidence of metastatic disease.     10/26/2021-bilateral diagnostic mammogram and ultrasound  Scattered fibroglandular density in both breast.  Postbiopsy hematoma with internal biopsy clip in the upper outer quadrant of the right breast, 8 cm from the nipple.  The hematoma size is decreased to 2.9 cm from 3.6 cm earlier.     Left breast-parenchymal density measuring 9 x 10 mm has markedly decreased.  Few adjacent calcifications which are unchanged.  No new abnormality in the left breast.  At 10:00 region there is some minimal adjacent hypoechoic texture which is difficult to measure but appears smaller since the previous exam.     10/28/2021-PET/CT  New L1 and L2 lytic bone metastasis annually intensely hypermetabolic focus at the left adrenal gland likely represents metastatic disease as well.  Slight decrease in size of the 0.9 x 0.7 cm left upper lobe pulmonary nodule.  There is hypermetabolic activity related to radiation pneumonitis.  The remainder of the nodule is photopenic.  Uncertain etiology of the more intense activity along the right lateral peripheral margin of the 2.6 cm postbiopsy density of the right breast.     She completed radiation to to the lumbar spine on 11/19/2021 11/22/2021-cycle  1 Alimta and Keytruda     3/23/2022-bilateral diagnostic mammogram and ultrasound  Complete resolution of the microcalcifications in the upper outer quadrant of the right breast and decrease in size of the postbiopsy hematoma.  No suspicious abnormalities in the right breast.  Benign-appearing calcifications at the site of malignancy in the left breast upper inner quadrant.  No other suspicious findings.     4/11/2022 PET/CT-there is new groundglass opacities in the left upper lobe which are most likely postradiation changes.  Other groundglass opacities in the left lung as well as the right lung remained stable.  Increased nodularity of the left adrenal gland with an SUV of 5.6, previously 3.5.    Increased uptake in the L1 vertebra in the right posterior aspect with an SUV of 3.6.  Left L1 sclerosis increased     Due to this the case was discussed in multidisciplinary conference.  The disease progression was significant in the left adrenal gland as well as the L1 vertebra.  Since there were only 2 areas of disease progression it has been decided to proceed with radiation to these 2 spots and continue on Keytruda and Alimta.     Patient also has had worsening of her kidney function.  We initially held treatment as of 5/31/2022 and creatinine bumped up to 2.0, BUN 23.  We then resumed therapy 6/11/2022 with Keytruda alone.  Patient was referred to nephrology.     6/6/2022-MRI of the spine-diffuse marrow replacement in the L1 vertebral body and inferior endplate concavity.  Suspicious for metastatic disease with pathological fracture in the inferior endplate.  Also diffuse signal abnormality in the L2 lamina on the left suspicious for additional metastatic disease.  Abnormality of the first sacral segment suspicious for metastatic disease.  Left adrenal gland appears enlarged.  30 to 40% height loss and L2 vertebral body suggestive of a subacute compression fracture.  Degenerative  changes.     7/18/2022-PET/CT  Multiple hypermetabolic osseous lesions with index lesions which have either increased in degree of FDG uptake or newly hypermetabolic lesions representative of metastatic disease and progression of disease.  Possible FDG avid lesion in the left femoral diaphysis however these are incompletely visualized and in the area of artifactual FDG uptake.  MRI of the left femur recommended for further evaluation.  Increasing FDG uptake of the left adrenal gland.  Focal left hilar hypermetabolic him corresponds to the lymph node which has minimally increased in size compared to April 2022.  New sub-6 mm pulmonary nodules.     7/26/2022-cycle 1 of Taxotere     Patient was admitted to the hospital 8/3/2022 discharged on 8/9/2022.  She was admitted for strokelike symptoms and confusion.  The confusion was thought to be secondary to polypharmacy and probably febrile neutropenia.  She was treated with antibiotics.  Mental status improved subsequently.  MRI of the cervical thoracic and lumbar spine was performed on 8/4/2022.  Images independently reviewed by me.  Multiple metastatic lesions in the spine noted.  There was a lesion on the CT which was concerning.  There is also a sacral Lesion measuring up to 5 cm in transverse dimension on the right.  Patient was symptomatic with pain on the right side of the sacrum.  Radiation oncology recommended radiation to the cervical spine as well as sacrum.  CT head without any obvious abnormalities.     Completed radiation to the cervical spine and sacrum on 8/12/2022    She was admitted to the hospital between 9/23/2022 to 10/12/2022.  She was admitted for poorly controlled pain as well as encephalopathy and multiple falls.  She was diagnosed with a UTI and treated with IV Rocephin.  Pain medications changed and also additional radiation to the right sacrum was performed.  3000 cGy in 10 fractions was administered.  She was discharged on oxycodone 15 mg every  4 hours as needed for pain and OxyContin twice daily.  She was recommended to go to a subacute rehab however she preferred to be discharged home with home physical therapy and Occupational Therapy    9/24/2022-MRI of the pelvis showed osseous metastatic disease in the sacrum worst within the right and probable pathological fractures bilaterally.    10/4/2022 CT head without any evidence of metastatic disease or acute abnormalities.    10/25/2022-PET/CT-resolution of hypermetabolic activity in the upper lobe and left hilum.  Previously noted new tiny pulmonary nodules have resolved.  Resolution of hypermetabolic activity in the left adrenal gland.  Near complete resolution of hypermetabolic activity in the skeletal metastasis particularly the cervical spine, pelvic bones and proximal left femur.  Maximal SUV at the right sacral metastasis is 2.7 and previously it was 8.9.  Tiny focus of hypermetabolic activity at the GE junction with a maximal SUV of 4.7.  No definite thickening or abnormality noted on the CT.    11/15/2022-lumbar puncture with negative cytology of the CSF.    11/21/2022-MRI of the brain with contrast and MRI of the cervical spine with contrast without any evidence of metastatic disease.    1/12/2023-PET/CT  Hypermetabolic mediastinal lymphadenopathy and multiple liver lesions which are hypermetabolic suggestive of metastasis.  New tiny left pleural effusion is noted.  Progression of bilateral sacral fractures and right L5 transverse process fracture.  There is also an acute/subacute L1 fracture.  Lumbar spine x-rays have been recommended for evaluation of the vertebral height or lumbar MRI to be compared to the priors.    1/23/2023-MRI of the brain  At least 9 separate tiny 2 to 6 mm enhancing lesions in the brain compatible with multiple new brain metastasis.  There has been interval development of 3 separate foci of encephalomalacia concerning for chronic infarcts but these are new since August  2022.  Stable osseous metastasis at C2.    Physical Exam  Vitals reviewed.   Constitutional:       General: She is not in acute distress.     Appearance: Normal appearance. She is well-developed.   HENT:      Head: Normocephalic and atraumatic.   Eyes:      Pupils: Pupils are equal, round, and reactive to light.   Cardiovascular:      Rate and Rhythm: Normal rate and regular rhythm.      Heart sounds: Normal heart sounds. No murmur heard.  Pulmonary:      Effort: Pulmonary effort is normal. No respiratory distress.      Breath sounds: Normal breath sounds. No wheezing, rhonchi or rales.   Abdominal:      General: Bowel sounds are normal. There is no distension.      Palpations: Abdomen is soft.   Musculoskeletal:         General: Normal range of motion.      Cervical back: Normal range of motion.   Skin:     General: Skin is warm and dry.      Findings: No rash.   Neurological:      Mental Status: She is alert and oriented to person, place, and time.         DIAGNOSTIC DATA:  Results Review:     I reviewed the patient's new clinical results.    Results from last 7 days   Lab Units 02/07/23  1300   WBC 10*3/mm3 34.70*   HEMOGLOBIN g/dL 9.4*   HEMATOCRIT % 29.6*   PLATELETS 10*3/mm3 205     Lab Results   Component Value Date    NEUTROABS 30.78 (H) 02/07/2023     Results from last 7 days   Lab Units 02/07/23  1300   SODIUM mmol/L 139   POTASSIUM mmol/L 4.3   CHLORIDE mmol/L 105   CO2 mmol/L 20.4*   BUN mg/dL 9   CREATININE mg/dL 1.11*   GLUCOSE mg/dL 134*   CALCIUM mg/dL 8.0*                 IMAGING:      PET/CT 1/12/2023 and MRI of the brain 1/23/2023-images independently reviewed and interpreted by me.    ASSESSMENT:  This is a 57 y.o. female with:     *Metastatic non-small cell lung cancer  · She was initially diagnosed with stage III (T1N2M0) disease.    · She received initial therapy with cisplatin and Alimta concurrent with radiation therapy beginning 5/25/2021.    · There were indeterminate findings in the  lumbar spine at L1 and it was recommended to monitor this over time.    · Patient completed 3 cycles cisplatin and Alimta 7/7/2021 and completed radiation therapy 7/12/2021.    · PET scan 8/6/2021 showed good response to treatment.    · MRI lumbar spine 10/1/2021 with increase in size of the L1 lesion.    · Biopsy of the L1 lesion on 10/14/2022 confirmed metastatic adenocarcinoma of lung origin, PD-L1 TPS 0.    · PET scan 10/26/2021 with involvement of left adrenal and L1/L2 only.    · Patient received radiation therapy to L1/L2 on 11/19/2021.  She also received radiation to the left adrenal gland.    · She initiated further systemic therapy on 11/22/2021 with Keytruda and Alimta.   · Follow-up PET scan 1/18/2022 with decrease in small left upper lobe pulmonary nodule, resolution of activity at L1/L2, no new sites of disease.    · Guardant 360 CT DNA level was monitored and was decreasing.    · PET scan 4/11/2022 with progression in left adrenal and L1.    · Maintained systemic therapy with Keytruda and Alimta and received additional radiation to left adrenal and L1.  Alimta however held since 5/10/2022 due to renal dysfunction.  Radiation completed to lumbar spine 7/14/2022.    · PET scan 7/18/2022 with multiple areas of progression of the spine and right sacrum.    · Guardant 360 analysis 7/20/2022 with no actionable mutations.    · Change in therapy to single agent palliative Taxotere initiated 7/26/2022.    · Altered mental status and febrile neutropenia requiring hospitalization 8/2 - 8/9/2022.  During that admission, MRI brain, cervical, thoracic, lumbar spine performed 8/4/2022 in addition to CT cervical spine 8/5/2022.  There was evidence of C2 metastatic lesion with some dural enhancement, compression deformity at T7 with mild edema suggesting acute to subacute fracture, 5 mm T12 spinous process metastasis, multifocal disease in the lumbar spine and sacrum, largest involving sacral ala to the right 5 cm in  transverse dimension.  Loss of height at L2 25%.  · Patient received cycle 3 Taxotere 9/6/2022 with Neulasta support.    · She has been continuing as well on Xgeva every 4 weeks (last received 9/6/2022).  · Patient did undergo MRI lumbar spine and pelvis on 9/24/2022.  MRI pelvis showed bilateral sacral fractures with marrow infiltration related to metastasis more prominent on the right.  Lesion on the right 3.5 x 4.1 x 4.3 cm.  Also probable metastasis along posterior acetabulum on the right 1.5 cm.  MRI lumbar spine with stable L1 metastasis, new edema endplate T12 possibly stress reaction versus developing new metastasis, lesion at T12 spinous process unchanged.   · Radiation oncology consulted with plans for palliative treatment of the right sacral metastasis.  Treatment initiated 9/28/2022  · Completed radiation to the right sacral metastasis on 10/11/2022.  · PET/CT 10/25/2022 without any metabolic uptake in the lung or the skeletal metastasis.  This is indicative of a positive response to Taxotere.  · Pain persistent sitting and walking position and not present when she is laying down.  · Patient is still very active and performance status is very poor.  Due to this reason chemotherapy will be continued to be held.  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  · Due to this reason an MRI of the brain was obtained but unfortunately this was performed without contrast.  Reviewed the MRI and no evidence of metastatic disease.  · 11/15/2022 lumbar puncture shows no evidence of malignancy in the CSF, elevated protein at 65.2, glucose normal at 58, culture no growth in 3 days  · Repeat MRI of the brain with contrast 11/21/2022 does not show any obvious abnormalities.  There is no enhancement of the CSF.  MRI of the cervical spine with no abnormalities.  · Patient unable to undergo MRI of the thoracic and lumbar spine due to mental status  and unable to being in 1 position as well as with incontinence.  · 1/4/2023-mental status has improved significantly.  Patient reports decreased oral intake and nausea and vomiting.  She is also reporting abdominal discomfort.  · 1/12/2023-PET/CT with mediastinal lymphadenopathy as well as multiple liver lesions suggestive of disease progression.  · 1/23/2023-MRI of the brain with at least 9 different foci of small mets measuring up to 6 mm.  There is also areas of encephalomalacia concerning for chronic infarcts.    · In the setting of disease progression we discussed resuming Taxotere.  She does have a fairly poor functional status however  · She does have a fairly poor functional status however mental status and ability to ambulate with the help of walker seem to be improving.  · We discussed at length the pros and cons of chemotherapy.  · In the absence of chemotherapy I see that the disease will progress and eventually her functional status will decline further.  · I did explain the risk associated with chemotherapy in someone who has a poor functional status.  · Patient and her  verbalized understanding and willing to proceed with Taxotere.  · Cycle 4 Taxotere given 1/31/2023 with a 20% dose reduction     *History of bilateral stage I breast cancer  · Patient with bilateral breast cancer, both stage I ([id number]), grade 2, ER/NV positive and HER2/melinda negative with low Ki-67.    · Patient initiated adjuvant letrozole in May 2021.  · Continue letrozole    *Mental status changes  · Patient's mental status is back at baseline.  · Significant improvement  · Currently off all psychotropic medications.  · Mental status at baseline.     *Cancer related pain  · Patient has been receiving Duragesic patch 50 mcg every 72 hours in addition to oxycodone 15 mg every 4 hours for breakthrough pain.  Duragesic dose had been escalated recently in the outpatient setting due to worsening pain  · On admission 9/23/2022,  Duragesic patch increased to 75 mcg daily, added Decadron 4 mg IV every 6 hours with continuation of oxycodone 15 mg every 4 hours as needed for breakthrough pain in addition of Dilaudid 1 mg every 2 hours as needed for breakthrough pain.  · Patient with increasing side effects from narcotics, Duragesic decreased on 9/25/2022 from 75 down to 50 mcg patch every 72 hours.  · Dexamethasone dose decreased to 4 mg p.o. every 12 hours on 9/27/2022  · Initiated palliative radiation to the right sacrum on 9/28/2022  · Patient became confused after receiving Dilaudid.  Patient and family asked to discontinue Dilaudid.  · Duragesic patch dose was increased to 75 mcg/h on 9/30/2022. Subsequent decrease to 50 mcg due to confusion  · Gabapentin was discontinued due to the sedating effect.  · Oxycodone is being used for breakthrough pain.  · 10/3 transition to sustained release oxycodone 40 mg bid and fentanyl patch stopped  · Poorly controlled, pain particularly worse in sitting and standing position.  · Unsure if extends is contributing to mental status changes as previously patient had been very sensitive to pain meds.  · Due to this reason we will discontinue the Xtampza ER and switch to OxyContin 20 mg twice daily.  · She continues to experience severe mental status changes.  · Long-acting pain medications discontinued due to mental status changes and she is currently on oxycodone 15 mg as needed.  · Reports better pain control after the epidural injection of the spine.  · Continue oxycodone as needed.      *Anxiety/depression  · She has been weaned off of Cymbalta as well as Wellbutrin.  · Mood stable.  Continue the same     *Insomnia  · She has been receiving Xanax as needed  · Continue the same     *Anemia  · Hemoglobin stable at 11.3 1/31/2023  · 2/7/2023 Hgb 9.4.  ·      *Peripheral neuropathy  · Patient was on gabapentin 300 mg twice daily.  · Gabapentin was discontinued on 9/30/2022.  · Neuropathy stable.     *Nausea  vomiting and abdominal discomfort  · Continues to experience vomiting.  · Abdominal discomfort has resolved  · PET/CT with liver metastasis.  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  · Continue Compazine as well as Zofran.  They are currently alternating.  · We will also get Sancuso patch approved.  · Nausea and vomiting poorly controlled.  · Patient seen 2/7/2023 currently utilizing Sancuso patch which she states has helped 90% with her nausea.  She denies vomiting.  She is declining IV fluids today.  Patient states that she will  work on hydrating at home.  We did discuss her creatinine is just slightly elevated today at 1.11, and the patient is going to push fluids.     *GI prophylaxis  · Continue omeprazole daily    *Deconditioning  · Gradually improving.  · Recommend physical therapy.    *Hypokalemia  · Potassium normal at 3.9  · Decrease potassium replacement to 20 mEq daily  · 2/7/2023 potassium 4.3.  Continue to monitor    RECOMMENDATIONS:  1. Patient declines IV fluids today.  2. She is going to push fluids at home.  3. Continue Sancuso patch.  4. Continue potassium 20 mEq daily.  5. Return for follow-up in 2 weeks with Dr. Snell with repeat labs reevaluation and consideration of her fifth cycle of Taxotere.    6. Call/ return sooner should she develop any new concerns or problems, or ongoing difficulty with nausea, vomiting, diarrhea.  7. Dr. Franklin recommends repeat MRI in 8 weeks and radiation following chemo    Patient is on a high risk medication requiring close monitoring for toxicity.      KORI Soliz

## 2023-02-07 NOTE — PROGRESS NOTES
The patient has a pain history of the following:  Non-small cell lung cancer with bone mets   Right breast neoplasm   Thoracic (T7, T12)and lumbar compression fractures   Left 7th & 8th rib fracture  Right sacral lesion     Previous interventions that the patient has received include:   Bilateral L5-S1 Transforaminal epidural steroid injection 12/28/22     Pain medications include:  Cymbalta  Oxycodone IR 15mg q4h prn   Gabapentin     Previously: Dexamethasone, Fentanyl patch, Dilaudid PO, Oxycodone ER 20mg BID     Other conservative modalities which the patient reports using include:  Physical Therapy: no  Chiropractor: no  Massage Therapy: no  TENS: no  Neck or back surgery: no  Past pain management: no     Past Significant Surgical History:  None     HPI:     CHIEF COMPLAINT Back Pain  F/u back and cancer pain- LUMBAR/SACRAL TRANSFORAMINAL EPIDURAL Bilateral L5-S1- patient states that she had 80% improvement for 4-5 weeks.     Subjective   Holli Patel is a 57 y.o. female  who presents to the office for follow-up of procedure.  She completed a Bilateral L5-S1 Transforaminal epidural steroid injection 12/28/22 for management of lumbar radiculopathy and foraminal stenosis. Patient reports 80% relief from the procedure for 4-5 weeks.     History of Present Illness  Mrs. Patel is here today with her  who helps provide history.  They state that for the 4 to 5 weeks following the injection, she was able to get off of her extended release oxycodone 20 mg twice daily, and she was taking only 3 oxycodone immediate release 15 mg/day.  Over the last week the pain has come back.  She describes the pain as a burning and aching sensation along the buttocks and the lateral/posterior aspect of the right lower extremity.  Her left lower extremity pain is not significant at this time.       PEG Assessment   What number best describes your pain on average in the past week?8  What number best describes how, during the  past week, pain has interfered with your enjoyment of life?9  What number best describes how, during the past week, pain has interfered with your general activity?  9    REVIEW OF PERTINENT MEDICAL DATA  8/4/22 MRI EXAMINATION OF THE LUMBAR SPINE WITH AND WITHOUT CONTRAST:     The alignment of the lumbar spine is within normal limits. There is  increased signal intensity involving the marrow of the lumbar spine  which suggests radiation related changes. Signal loss throughout the L1  vertebral body is noted on the T1 sequence consistent with metastatic  disease. There is a mild concave deformity involving the inferior  endplate of L1 and to a lesser extent superior endplate of L1. A  fracture plane is noted paralleling the inferior endplate. Edema tracks  into the pedicles bilaterally. Signal loss and enhancement is  appreciated involving the superior articulating facet of L2 to the left  consistent with metastatic disease. A 5 mm metastatic lesion involving  the spinous process of T12 superiorly is again noted. A large metastatic  lesion is appreciated involving the sacral ala on the right. This  measures approximately 5 cm in transverse dimension.     The conus is at T12-L1 and the caudal aspect of the spinal cord appears  unremarkable.     T12-L1: There is no evidence of disc bulge or herniation.     L1-L2: A mild broad-based disc osteophyte complex is present which is  more prominent to the right.     L2-L3: There is no evidence of disc bulge or herniation.     L3-L4: A mild broad-based disc bulge is present with no evidence of  herniation.     L4-L5: Mild facet degenerative disease is present bilaterally. A mild  central disc osteophyte complex is present with no evidence of  herniation.     L5-S1: Moderate-to-severe facet degenerative disease is present  bilaterally. Moderate foraminal stenosis is present on the right and  there is moderate-to-severe foraminal stenosis on the left secondary to  loss of disc  height and extension of a disc osteophyte complex into the  neural foramen.     IMPRESSION:  1.  Multifocal metastatic disease involving the lumbar spine and sacrum  is noted as described above with the largest lesion involving the sacral  ala to the right measuring approximately 5 cm in transverse dimension.  Metastatic involvement of L1 is appreciated throughout the vertebral  body and extending to the pedicles bilaterally. There is no evidence of  epidural extension. Smaller metastatic foci are appreciated involving  the superior articulating facet of L1 to the left and the superior  aspect of the spinous process of T12.  2.  Loss of vertical height at L2 is appreciated estimated to be  approximately 25% in severity. There is edema paralleling the superior  endplate. The mild compression deformity at L2 is new versus the MRI  examination of 10/01/2021.     The above information was called to and discussed with Dr. Jade on  08/04/2022 at 1240 hours.     This report was finalized on 8/5/2022 9:05 AM by Dr. Brenton Parham M.D.         The following portions of the patient's history were reviewed and updated as appropriate: allergies, current medications, past family history, past medical history, past social history, past surgical history and problem list.    Review of Systems   Constitutional: Positive for activity change (decreased) and fatigue. Negative for chills and fever.   HENT: Negative for congestion.    Eyes: Negative for visual disturbance.   Respiratory: Negative for chest tightness and shortness of breath.    Cardiovascular: Negative for chest pain.   Gastrointestinal: Negative for abdominal pain, constipation and diarrhea.   Genitourinary: Negative for difficulty urinating, dyspareunia and dysuria.   Musculoskeletal: Positive for back pain.   Neurological: Negative for dizziness, weakness, light-headedness, numbness and headaches.   Psychiatric/Behavioral: Positive for agitation and sleep disturbance.  "Negative for self-injury and suicidal ideas. The patient is nervous/anxious.        Vitals:    02/07/23 0858   BP: 130/91   Pulse: 117   Temp: 99.8 °F (37.7 °C)   SpO2: 92%   Weight: 61.2 kg (135 lb)   Height: 160 cm (63\")   PainSc:   5   PainLoc: Buttocks  Comment: right, leg         Objective   Physical Exam  Vitals reviewed.   Constitutional:       General: She is not in acute distress.  Pulmonary:      Effort: Pulmonary effort is normal. No respiratory distress.   Musculoskeletal:      Comments: Positive straight leg test on the right.   Skin:     General: Skin is warm and dry.      Findings: No bruising or erythema.   Neurological:      Mental Status: She is alert.   Psychiatric:         Mood and Affect: Mood normal.         Thought Content: Thought content normal.             Assessment & Plan   Diagnoses and all orders for this visit:    1. Lumbar radiculopathy (Primary)  -     Case Request    2. Lumbar foraminal stenosis  -     Case Request        -We will schedule for right L5-S1 transforaminal epidural steroid injection.  Risks discussed including but not limited to bleeding, bruising, infection, damage to surrounding structures, headache, and rare things such as being paralyzed, seizure, stroke, heart attack and death.  The risk of steroid medications include but are not limited to immunosuppression, which can increase the risk of erlinda an infectious disease as well as decrease the immune response to a vaccine.    - Will touch base with Dr. Snell to make sure she can hold her eliquis for 3 days prior to her epidural.   - Okay to hold 2/7/23.    - Holli Patel reports a pain score of 5.  Given her pain assessment as noted, treatment options were discussed and the following options were decided upon as a follow-up plan to address the patient's pain: steroid injections  - Patient screened positive for depression based on a PHQ-9 score of 13 on 8/19/2022. Follow-up recommendations include: PCP " managing depression.      --- Follow-up for right L5-S1 Transforaminal epidural steroid injection and office visit 4-6 weeks following.          While examining this patient, I wore protective equipment including a mask, eye sheild and gloves.  I washed my hands before and after this patient encounter.  The patient wore a mask throughout the visit as well.     Breann Santiago MD  Pain Management

## 2023-02-09 NOTE — SIGNIFICANT NOTE
Patient educated on the following :    - If you are receiving Sedation for your procedure Nothing to Eat 6 hours and only clear liquids for 2 hours prior to your procedure.     -You will need to have someone drive you home after your PROCEDURE and remain with you for 24 hours after the PROCEDURE  - The date of your procedure, your are welcome to have one visitor at bedside or remain within 10-15 minutes of [facility]  -You will need to arrive at 0915 on 2/13/2023 for your PROCEDURE  -Please contact [facility] at: [phone] with any questions and/or concerns

## 2023-02-09 NOTE — DISCHARGE INSTRUCTIONS
[facility] - Post-procedure Instructions          --  While there are no absolute restrictions, it is recommended that you do not perform strenuous activity today. In the morning, you may resume your level of activity as before your block.    --  If you have a band-aid at your injection site, please remove it later today. Observe the area for any redness, swelling, pus-like drainage, or a temperature over 101°. If any of these symptoms occur, please call your doctor at [phone]. If after office hours, leave a message and the on-call provider will return your call.    --  Ice may be applied to your injection site. It is recommended you avoid direct heat (heating pad; hot tub) for 1-2 days.    --  Call [facility] Management at [phone] if you experience persistent headache, persistent bleeding from the injection site, or severe pain not relieved by heat or oral medication.    --  Do not make important decisions today.    --  Due to the effects of the block and/or the I.V. Sedation, DO NOT drive or operate hazardous machinery for 12 hours.  Local anesthetics may cause numbness after procedure and precautions must be taken with regards to operating equipment as well as with walking, even if ambulating with assistance of another person or with an assistive device.    --  Do not drink alcohol for 12 hours.    -- You may return to work tomorrow, or as directed by your referring doctor.    --  Occasionally you may notice a slight increase in your pain after the procedure. This should start to improve within the next 24-48 hours. Radiofrequency ablation procedure pain may last 3-4 weeks.    --  It may take as long as 3-4 days before you notice a gradual improvement in your pain and/or other symptoms.    -- You may continue to take your prescribed pain medication as needed.    --  Some normal possible side effects of steroid use could include fluid retention, increased blood sugar, dull headache,  increased sweating, increased appetite, mood swings and flushing.    --  Diabetics are recommended to watch their blood glucose level closely for 24-48 hours after the injection.    --  Must stay in PACU for 20 min upon arrival and prove no leg weakness before being discharged.    --  IN THE EVENT OF A LIFE THREATENING EMERGENCY, (CHEST PAIN, BREATHING DIFFICULTIES, PARALYSIS…) YOU SHOULD GO TO YOUR NEAREST EMERGENCY ROOM.    --  You should be contacted by our office within 2-3 days to schedule follow up or next appointment date.  If not contacted within 7 days, please call the office at [phone]    Resume Eliquis in 24 hours.

## 2023-02-13 ENCOUNTER — TELEPHONE (OUTPATIENT)
Dept: ONCOLOGY | Facility: CLINIC | Age: 58
End: 2023-02-13
Payer: COMMERCIAL

## 2023-02-13 ENCOUNTER — HOSPITAL ENCOUNTER (OUTPATIENT)
Facility: SURGERY CENTER | Age: 58
Setting detail: HOSPITAL OUTPATIENT SURGERY
Discharge: HOME | End: 2023-02-13
Attending: ANESTHESIOLOGY | Admitting: ANESTHESIOLOGY
Payer: COMMERCIAL

## 2023-02-13 ENCOUNTER — HOSPITAL ENCOUNTER (OUTPATIENT)
Dept: GENERAL RADIOLOGY | Facility: SURGERY CENTER | Age: 58
Setting detail: HOSPITAL OUTPATIENT SURGERY
End: 2023-02-13
Payer: COMMERCIAL

## 2023-02-13 VITALS
SYSTOLIC BLOOD PRESSURE: 133 MMHG | HEIGHT: 69 IN | BODY MASS INDEX: 19.99 KG/M2 | WEIGHT: 135 LBS | OXYGEN SATURATION: 95 % | TEMPERATURE: 98.3 F | HEART RATE: 96 BPM | DIASTOLIC BLOOD PRESSURE: 96 MMHG | RESPIRATION RATE: 16 BRPM

## 2023-02-13 DIAGNOSIS — F41.9 ANXIETY: ICD-10-CM

## 2023-02-13 DIAGNOSIS — M54.16 LUMBAR RADICULOPATHY: ICD-10-CM

## 2023-02-13 DIAGNOSIS — M48.061 LUMBAR FORAMINAL STENOSIS: ICD-10-CM

## 2023-02-13 DIAGNOSIS — Z41.9 SURGERY, ELECTIVE: ICD-10-CM

## 2023-02-13 PROCEDURE — 25010000002 DEXAMETHASONE SODIUM PHOSPHATE 100 MG/10ML SOLUTION 10 ML VIAL: Performed by: ANESTHESIOLOGY

## 2023-02-13 PROCEDURE — 25010000002 IOPAMIDOL 61 % SOLUTION 30 ML VIAL: Performed by: ANESTHESIOLOGY

## 2023-02-13 PROCEDURE — 76000 FLUOROSCOPY <1 HR PHYS/QHP: CPT

## 2023-02-13 PROCEDURE — 77002 NEEDLE LOCALIZATION BY XRAY: CPT

## 2023-02-13 PROCEDURE — 64483 NJX AA&/STRD TFRM EPI L/S 1: CPT | Performed by: ANESTHESIOLOGY

## 2023-02-13 RX ORDER — SODIUM CHLORIDE 0.9 % (FLUSH) 0.9 %
10 SYRINGE (ML) INJECTION AS NEEDED
Status: DISCONTINUED | OUTPATIENT
Start: 2023-02-13 | End: 2023-02-13 | Stop reason: HOSPADM

## 2023-02-13 RX ORDER — SODIUM CHLORIDE 0.9 % (FLUSH) 0.9 %
10 SYRINGE (ML) INJECTION EVERY 12 HOURS SCHEDULED
Status: DISCONTINUED | OUTPATIENT
Start: 2023-02-13 | End: 2023-02-13 | Stop reason: HOSPADM

## 2023-02-13 NOTE — TELEPHONE ENCOUNTER
Returned call to Jelani, he left a voicemail stating they received a denial from insurance on the sancuso patches. I explained this was likely auto-generated as it was approved, and he would not have been able to  the prescription had we not gotten the authorization.  He also reports Holli is experiencing some mouth sores specifically when she eats acidic foods.  Recommend avoiding those, however will also send in Gwen's magic mouthwash to the [facility] pharmacy.

## 2023-02-13 NOTE — TELEPHONE ENCOUNTER
Rx Refill Note  Requested Prescriptions     Pending Prescriptions Disp Refills   • ALPRAZolam (XANAX) 0.5 MG tablet 120 tablet 1     Si p.o. every 6 hours, change in quantity, metastatic cancer      Last office visit with prescribing clinician: 2022   Last telemedicine visit with prescribing clinician: 3/10/2023   Next office visit with prescribing clinician: 3/10/2023                         Would you like a call back once the refill request has been completed: [] Yes [] No    If the office needs to give you a call back, can they leave a voicemail: [] Yes [] No    North Young MA  23, 09:15 EST

## 2023-02-13 NOTE — TELEPHONE ENCOUNTER
Caller: BK CARPENTER    Relationship: Emergency Contact    Best call back number: [phone]     Requested Prescriptions:   Requested Prescriptions     Pending Prescriptions Disp Refills   • ALPRAZolam (XANAX) 0.5 MG tablet 120 tablet 1     Si p.o. every 6 hours, change in quantity, metastatic cancer        Pharmacy where request should be sent: [id number] - [place], [place] - [address] [phone] [facility] [phone] FX     Does the patient have less than a 3 day supply:  [] Yes  [x] No    Would you like a call back once the refill request has been completed: [] Yes [x] No    If the office needs to give you a call back, can they leave a voicemail: [] Yes [x] No    Pepito Morrison Rep   23 09:13 EST

## 2023-02-13 NOTE — OP NOTE
Lumbar Transforaminal Epidural Steroid Injection Right L5-S1 Levels  [facility]    PREOPERATIVE DIAGNOSIS:  Lumbar radicular pain, Lumbar Radiculopathy    POSTOPERATIVE DIAGNOSIS:  Same as preop diagnosis    PROCEDURE:    1. CPT 29348 --  Diagnostic & Therapeutic Transforaminal Epidural Steroid Injection at the L5 level, on the right     PRE-PROCEDURE DISCUSSION WITH PATIENT:    Risks and complications were discussed with the patient prior to starting the procedure and informed consent was obtained.  We discussed various topics including but not limited to bleeding, infection, injury, nerve injury, paralysis, coma, death, postprocedural painful flare-up, postprocedural site soreness, and a lack of pain relief.  We discussed the diagnostic aspect of transforaminal epidural / selective nerve root blockade.    SURGEON:  Breann Santiago MD    SEDATION:  No sedation was used for this procedure  ANESTHETIC:  0.5% bupivacaine  STEROID:  15mg dexamethasone    DESCRIPTON OF PROCEDURE:  After obtaining informed consent, an I.V. was not started in the preoperative area. The patient taken to the operating room and was placed in the prone position with a pillow under the abdomen.  All pressure points were well padded.  EKG, blood pressure, and pulse oximeter were monitored.  The lumbar area was prepped with Chloraprep and draped in a sterile fashion.     The AP fluoroscopic image was used to visualize the L5 vertebral body.  The superior endplate was squared off radiographically.  The image was then obliqued towards the patient's right side to maximize visualization of the pedicle. The skin and subcutaneous tissue at the 6 o'clock position of the pedicle was anesthetized with 1% lidocaine. A 22-gauge spinal needle was then advanced percutaneously through the anesthetized skin tract under fluoroscopic guidance in AP and lateral views until the needle tip lie in the nelia-lateral aspect of the epidural space.   After negative aspiration of the needle for blood or CSF, a volume of 1 mL of Isovue was injected producing good epidural spread with no evidence of loculation, vascular run-off, or intrathecal spread. Subsequently, a volume of 3 mL of injectate was administered without resistance.  The needle was removed intact.  Vital signs were stable throughout.      The total volume injected consisted of 15 mg of dexamethasone with 0.5 mL of 0.5% bupivacaine and preservative-free normal saline.       ESTIMATED BLOOD LOSS:  <5 mL  SPECIMENS:  none    COMPLICATIONS:   No complications were noted., There was no indication of vascular uptake on live injection of contrast dye. and There was no indication of intrathecal uptake on live injection of contrast dye.    TOLERANCE & DISCHARGE CONDITION:    The patient tolerated the procedure well.  The patient was transported to the recovery area without difficulties.  The patient was discharged to home under the care of family in stable and satisfactory condition.    PLAN OF CARE:  1. The patient was given our standard instruction sheet.  2. The patient will Return to clinic 4-6 wks.  3. The patient will resume all medications as per the medication reconciliation sheet.

## 2023-02-15 RX ORDER — ALPRAZOLAM 0.5 MG/1
TABLET ORAL
Qty: 120 TABLET | Refills: 0 | Status: SHIPPED | OUTPATIENT
Start: 2023-02-15

## 2023-02-21 ENCOUNTER — INFUSION (OUTPATIENT)
Dept: ONCOLOGY | Facility: HOSPITAL | Age: 58
End: 2023-02-21
Payer: COMMERCIAL

## 2023-02-21 ENCOUNTER — OFFICE VISIT (OUTPATIENT)
Dept: ONCOLOGY | Facility: CLINIC | Age: 58
End: 2023-02-21
Payer: COMMERCIAL

## 2023-02-21 VITALS
BODY MASS INDEX: 18.94 KG/M2 | RESPIRATION RATE: 16 BRPM | DIASTOLIC BLOOD PRESSURE: 84 MMHG | OXYGEN SATURATION: 96 % | WEIGHT: 127.9 LBS | SYSTOLIC BLOOD PRESSURE: 130 MMHG | HEIGHT: 69 IN | TEMPERATURE: 97.3 F | HEART RATE: 121 BPM

## 2023-02-21 DIAGNOSIS — C79.51 NON-SMALL CELL LUNG CANCER METASTATIC TO BONE: Primary | ICD-10-CM

## 2023-02-21 DIAGNOSIS — C34.90 NON-SMALL CELL LUNG CANCER METASTATIC TO BONE: Primary | ICD-10-CM

## 2023-02-21 DIAGNOSIS — C34.90 NON-SMALL CELL LUNG CANCER METASTATIC TO BONE: ICD-10-CM

## 2023-02-21 DIAGNOSIS — C79.51 NON-SMALL CELL LUNG CANCER METASTATIC TO BONE: ICD-10-CM

## 2023-02-21 LAB
ALBUMIN SERPL-MCNC: 3.1 G/DL (ref 3.5–5.2)
ALBUMIN/GLOB SERPL: 1.1 G/DL (ref 1.1–2.4)
ALP SERPL-CCNC: 156 U/L (ref 38–116)
ALT SERPL W P-5'-P-CCNC: 11 U/L (ref 0–33)
ANION GAP SERPL CALCULATED.3IONS-SCNC: 11.8 MMOL/L (ref 5–15)
AST SERPL-CCNC: 17 U/L (ref 0–32)
BASOPHILS # BLD AUTO: 0.02 10*3/MM3 (ref 0–0.2)
BASOPHILS NFR BLD AUTO: 0.2 % (ref 0–1.5)
BILIRUB SERPL-MCNC: 0.2 MG/DL (ref 0.2–1.2)
BUN SERPL-MCNC: 16 MG/DL (ref 6–20)
BUN/CREAT SERPL: 16.7 (ref 7.3–30)
CALCIUM SPEC-SCNC: 8.7 MG/DL (ref 8.5–10.2)
CHLORIDE SERPL-SCNC: 106 MMOL/L (ref 98–107)
CO2 SERPL-SCNC: 21.2 MMOL/L (ref 22–29)
CREAT SERPL-MCNC: 0.96 MG/DL (ref 0.6–1.1)
DEPRECATED RDW RBC AUTO: 55.9 FL (ref 37–54)
EGFRCR SERPLBLD CKD-EPI 2021: 68.3 ML/MIN/1.73
EOSINOPHIL # BLD AUTO: 0 10*3/MM3 (ref 0–0.4)
EOSINOPHIL NFR BLD AUTO: 0 % (ref 0.3–6.2)
ERYTHROCYTE [DISTWIDTH] IN BLOOD BY AUTOMATED COUNT: 16.7 % (ref 12.3–15.4)
GLOBULIN UR ELPH-MCNC: 2.7 GM/DL (ref 1.8–3.5)
GLUCOSE SERPL-MCNC: 153 MG/DL (ref 74–124)
HCT VFR BLD AUTO: 27.5 % (ref 34–46.6)
HGB BLD-MCNC: 8.5 G/DL (ref 12–15.9)
IMM GRANULOCYTES # BLD AUTO: 0.11 10*3/MM3 (ref 0–0.05)
IMM GRANULOCYTES NFR BLD AUTO: 1 % (ref 0–0.5)
LYMPHOCYTES # BLD AUTO: 0.47 10*3/MM3 (ref 0.7–3.1)
LYMPHOCYTES NFR BLD AUTO: 4.2 % (ref 19.6–45.3)
MAGNESIUM SERPL-MCNC: 2 MG/DL (ref 1.8–2.5)
MCH RBC QN AUTO: 29.2 PG (ref 26.6–33)
MCHC RBC AUTO-ENTMCNC: 30.9 G/DL (ref 31.5–35.7)
MCV RBC AUTO: 94.5 FL (ref 79–97)
MONOCYTES # BLD AUTO: 0.39 10*3/MM3 (ref 0.1–0.9)
MONOCYTES NFR BLD AUTO: 3.5 % (ref 5–12)
NEUTROPHILS NFR BLD AUTO: 10.31 10*3/MM3 (ref 1.7–7)
NEUTROPHILS NFR BLD AUTO: 91.1 % (ref 42.7–76)
NRBC BLD AUTO-RTO: 0 /100 WBC (ref 0–0.2)
PHOSPHATE SERPL-MCNC: 3 MG/DL (ref 2.5–4.5)
PLATELET # BLD AUTO: 173 10*3/MM3 (ref 140–450)
PMV BLD AUTO: 9.4 FL (ref 6–12)
POTASSIUM SERPL-SCNC: 4.5 MMOL/L (ref 3.5–4.7)
PROT SERPL-MCNC: 5.8 G/DL (ref 6.3–8)
RBC # BLD AUTO: 2.91 10*6/MM3 (ref 3.77–5.28)
SODIUM SERPL-SCNC: 139 MMOL/L (ref 134–145)
WBC NRBC COR # BLD: 11.3 10*3/MM3 (ref 3.4–10.8)

## 2023-02-21 PROCEDURE — 25010000002 DOCETAXEL 20 MG/ML SOLUTION 8 ML VIAL: Performed by: INTERNAL MEDICINE

## 2023-02-21 PROCEDURE — 83735 ASSAY OF MAGNESIUM: CPT

## 2023-02-21 PROCEDURE — 85025 COMPLETE CBC W/AUTO DIFF WBC: CPT

## 2023-02-21 PROCEDURE — 84100 ASSAY OF PHOSPHORUS: CPT

## 2023-02-21 PROCEDURE — 80053 COMPREHEN METABOLIC PANEL: CPT

## 2023-02-21 PROCEDURE — 96375 TX/PRO/DX INJ NEW DRUG ADDON: CPT

## 2023-02-21 PROCEDURE — 25010000002 DIPHENHYDRAMINE PER 50 MG: Performed by: INTERNAL MEDICINE

## 2023-02-21 PROCEDURE — 96372 THER/PROPH/DIAG INJ SC/IM: CPT

## 2023-02-21 PROCEDURE — 96413 CHEMO IV INFUSION 1 HR: CPT

## 2023-02-21 PROCEDURE — 25010000002 GRANISETRON PER 100 MCG: Performed by: INTERNAL MEDICINE

## 2023-02-21 PROCEDURE — 25010000002 DENOSUMAB 120 MG/1.7ML SOLUTION: Performed by: INTERNAL MEDICINE

## 2023-02-21 PROCEDURE — 99214 OFFICE O/P EST MOD 30 MIN: CPT | Performed by: INTERNAL MEDICINE

## 2023-02-21 RX ORDER — GRANISETRON HYDROCHLORIDE 1 MG/ML
1 INJECTION INTRAVENOUS ONCE
Status: CANCELLED
Start: 2023-02-21 | End: 2023-02-21

## 2023-02-21 RX ORDER — FAMOTIDINE 10 MG/ML
20 INJECTION, SOLUTION INTRAVENOUS ONCE
Status: CANCELLED | OUTPATIENT
Start: 2023-02-21

## 2023-02-21 RX ORDER — FAMOTIDINE 10 MG/ML
20 INJECTION, SOLUTION INTRAVENOUS AS NEEDED
Status: CANCELLED | OUTPATIENT
Start: 2023-02-21

## 2023-02-21 RX ORDER — SODIUM CHLORIDE 9 MG/ML
250 INJECTION, SOLUTION INTRAVENOUS ONCE
Status: COMPLETED | OUTPATIENT
Start: 2023-02-21 | End: 2023-02-21

## 2023-02-21 RX ORDER — SODIUM CHLORIDE 9 MG/ML
250 INJECTION, SOLUTION INTRAVENOUS ONCE
Status: CANCELLED | OUTPATIENT
Start: 2023-02-21

## 2023-02-21 RX ORDER — DIPHENHYDRAMINE HYDROCHLORIDE 50 MG/ML
50 INJECTION INTRAMUSCULAR; INTRAVENOUS AS NEEDED
Status: CANCELLED | OUTPATIENT
Start: 2023-02-21

## 2023-02-21 RX ORDER — FAMOTIDINE 10 MG/ML
20 INJECTION, SOLUTION INTRAVENOUS ONCE
Status: COMPLETED | OUTPATIENT
Start: 2023-02-21 | End: 2023-02-21

## 2023-02-21 RX ORDER — GRANISETRON HYDROCHLORIDE 1 MG/ML
1 INJECTION INTRAVENOUS ONCE
Status: COMPLETED | OUTPATIENT
Start: 2023-02-21 | End: 2023-02-21

## 2023-02-21 RX ADMIN — SODIUM CHLORIDE 250 ML: 9 INJECTION, SOLUTION INTRAVENOUS at 11:00

## 2023-02-21 RX ADMIN — DIPHENHYDRAMINE HYDROCHLORIDE 25 MG: 50 INJECTION, SOLUTION INTRAMUSCULAR; INTRAVENOUS at 11:00

## 2023-02-21 RX ADMIN — DENOSUMAB 120 MG: 120 INJECTION SUBCUTANEOUS at 12:46

## 2023-02-21 RX ADMIN — DOCETAXEL 95 MG: 20 INJECTION, SOLUTION, CONCENTRATE INTRAVENOUS at 11:47

## 2023-02-21 RX ADMIN — FAMOTIDINE 20 MG: 10 INJECTION INTRAVENOUS at 11:00

## 2023-02-21 RX ADMIN — GRANISETRON HYDROCHLORIDE 1 MG: 1 INJECTION INTRAVENOUS at 11:00

## 2023-02-21 NOTE — PROGRESS NOTES
[facility] INPATIENT PROGRESS NOTE    Length of Stay:  0 days    CHIEF COMPLAINT/REASON FOR VISIT:  Metastatic non-small cell lung cancer  Bilateral breast cancer    Interval history  Marcelle presents today for cycle 2 of Taxotere.  She received 60 mg per metered squared of Taxotere 3 weeks ago.  She tolerated that fairly well.  She had significant amount of nausea and vomiting for which she was prescribed Sancuso and that took care of the nausea as well as vomiting.  Denies any diarrhea.  No worsening of neuropathy.  Her mental status is back to baseline.  She is a little tachycardic and anxious due to the steroids but otherwise reports feeling great.  Reports a good appetite  Denies any headaches or blurry vision at this time.    Oncologic history  Ms. 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     3/1/2021-bilateral screening mammogram-microcalcifications seen in the posterior one third of the lateral aspect of the right breast.  Area of focal asymmetry seen in the middle one third of the upper inner quadrant of the left breast.     3/1/2021-DEXA scan-T score of -2.2 on the lumbar spine and T score of -2.3 in the left hip and T score of -1.9 in the right hip.  Findings consistent with osteopenia     3/23/2021-screening lung CT-there is a 10 x 11 mm solid nodule in the left upper lobe.  Enlarged AP window lymph node measuring 14 x 10 mm.  Suggestion of a possible 9 mm left hilar lymph node.  Heavy coronary artery calcification.     3/23/2021-diagnostic mammogram bilateral-cluster of microcalcifications in the middle third lateral aspect of the right breast.  Left breast demonstrates persistence of the area of focal asymmetry in the region.     Ultrasound-left breast ultrasound at 10 o'clock position, 6 cm from the nipple there is a 0.4 cm irregular hypoechoic lesion.  Stereotactic biopsy of the right breast  calcifications recommended.  Ultrasound-guided biopsy of the left breast lesion recommended     4/7/2021-right breast stereotactic biopsy and left breast ultrasound-guided biopsy  Pathology  Right breast-invasive ductal carcinoma, grade 2, lymphovascular invasion present, ER +99% strong, WA +80% moderate, HER-2 negative, Ki-67 12%  Left breast upper inner quadrant 10 o'clock position biopsy, invasive ductal carcinoma, grade   2, ER +99% strong, WA +40% strong, HER-2 2+ on immunohistochemistry, nonamplified on FISH Ki-67 10%     4/14/2021-PET/CT-FDG avid aortopulmonary window lymph node measures 0.9 cm with an SUV of 7.5.  FDG avid left hilar lymph node measures 1 cm with an SUV of 17.2.  Irregular soft tissue density in the right breast measuring 3.7 x 3.8 cm is favored to be secondary to the recent breast biopsy.  1.1 cm pulmonary nodule within the left upper lobe with SUV 9.7 .  Sub-6 mm pulmonary nodules are present in the right lower lobe.  No evidence of lymphadenopathy or metastatic disease in the abdomen.  Focal area of FDG uptake within the central canal posterior to T11-T12 displaced demonstrating an SUV of 5.5 thought to be reactive however MRI is recommended for further evaluation.     She was seen by Dr. Molina who initially planned for breast surgery however  due to the PET abnormalities she has been referred to Dr. Kerr who plans to do a wound on 4/30/2021.  Dr. Molina's and Dr. Kerr's notes reviewed.     Patient denies any family history of breast cancer.  Her mother had lymphoma.  Maternal grandmother had colon cancer.  Prior to that screening mammogram she did not have any palpable abnormalities of either breast.     She has been a heavy smoker for about 40 years and smoked 2 packs/day.  Denies any recent weight changes, new bone pains, cough, abdominal pain nausea vomiting constipation or diarrhea.  She does have anxiety and has been on several medications.  She has recently been started on  Xanax to help with the mood and also with insomnia.     Patient had a video-assisted thoracoscopy and mediastinal lymphadenectomy on 4/30/2021.  L5-L6 lymph nodes were biopsied however the small pulmonary nodule in the left upper lobe was not difficult to find.  Pathology showed moderately differentiated adenocarcinoma which is CK7 and TTF-1 positive.  Consistent with lung primary.  Ki-67 85%.     5/14/2021-MRI of the brain-minimal chronic small vessel ischemic change in the white matter.  Otherwise negative MRI.     5/20/2021-bilateral axillary ultrasound-no evidence of axillary lymphadenopathy in either axilla.  Normal-appearing bilateral axillary lymph nodes visualized.     Cycle 1 cisplatin and Alimta on 5/25/2021.     Cycle 2 cisplatin Alimta 6/15/2021     Cycle 3 cisplatin Alimta 7/7/2021     Completed radiation on 7/12/2021     Port was nonfunctional hence a port study was performed which showed that the port was backed up into the innominate vein and also there was a nonocclusive thrombus at the end of the catheter extending into the superior vena cava.  She was started on anticoagulation with Eliquis.  It was recommended for port revision of the intention to keep the port.     PET/CT 8/5/2021-images independently reviewed and interpreted by me-decrease in size and FDG uptake of the left upper lobe pulmonary nodule as well as mediastinal and left hilar lymphadenopathy representing response to treatment.  Sub-6 mm pulmonary nodule in the left upper lobe new since 4/14/2021.  This could be related to radiation however follow-up CT in 3 months recommended.  Decrease in size of the irregular masslike tissue in the right breast which is postbiopsy hematoma.  This is not PET avid.  New segmental left eighth rib fractures healing.  A new band of sclerosis of the left seventh rib which favored to represent healing nondisplaced fracture.  Follow-up CT recommended.  focal uptake in the duodenum first and second  portions.  Could be related to duodenitis.  Indeterminate lesion in the L1 vertebral body not well evaluated on PET/CT.     10/1/2021-MRI of the lumbar spine.  Lesion in the left posterior body of L1 measures 14 x 14 x 12 mm and previously 5 x 5 x 6 mm.  The interval enlargement strongly suggest that it is metastatic lesion.  No additional osseous metastasis noted.   Other chronic changes noted.     10/14/2021-biopsy of the lumbar spine lesion-pathology consistent with poorly differentiated carcinoma.  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     10/26/2021-brain MRI-no evidence of metastatic disease.     10/26/2021-bilateral diagnostic mammogram and ultrasound  Scattered fibroglandular density in both breast.  Postbiopsy hematoma with internal biopsy clip in the upper outer quadrant of the right breast, 8 cm from the nipple.  The hematoma size is decreased to 2.9 cm from 3.6 cm earlier.     Left breast-parenchymal density measuring 9 x 10 mm has markedly decreased.  Few adjacent calcifications which are unchanged.  No new abnormality in the left breast.  At 10:00 region there is some minimal adjacent hypoechoic texture which is difficult to measure but appears smaller since the previous exam.     10/28/2021-PET/CT  New L1 and L2 lytic bone metastasis annually intensely hypermetabolic focus at the left adrenal gland likely represents metastatic disease as well.  Slight decrease in size of the 0.9 x 0.7 cm left upper lobe pulmonary nodule.  There is hypermetabolic activity related to radiation pneumonitis.  The remainder of the nodule is photopenic.  Uncertain etiology of the more intense activity along the right lateral peripheral margin of the 2.6 cm postbiopsy density of the right breast.     She completed radiation to to the lumbar spine on 11/19/2021 11/22/2021-cycle 1 Alimta and Keytruda     3/23/2022-bilateral diagnostic  mammogram and ultrasound  Complete resolution of the microcalcifications in the upper outer quadrant of the right breast and decrease in size of the postbiopsy hematoma.  No suspicious abnormalities in the right breast.  Benign-appearing calcifications at the site of malignancy in the left breast upper inner quadrant.  No other suspicious findings.     4/11/2022 PET/CT-there is new groundglass opacities in the left upper lobe which are most likely postradiation changes.  Other groundglass opacities in the left lung as well as the right lung remained stable.  Increased nodularity of the left adrenal gland with an SUV of 5.6, previously 3.5.    Increased uptake in the L1 vertebra in the right posterior aspect with an SUV of 3.6.  Left L1 sclerosis increased     Due to this the case was discussed in multidisciplinary conference.  The disease progression was significant in the left adrenal gland as well as the L1 vertebra.  Since there were only 2 areas of disease progression it has been decided to proceed with radiation to these 2 spots and continue on Keytruda and Alimta.     Patient also has had worsening of her kidney function.  We initially held treatment as of 5/31/2022 and creatinine bumped up to 2.0, BUN 23.  We then resumed therapy 6/11/2022 with Keytruda alone.  Patient was referred to nephrology.     6/6/2022-MRI of the spine-diffuse marrow replacement in the L1 vertebral body and inferior endplate concavity.  Suspicious for metastatic disease with pathological fracture in the inferior endplate.  Also diffuse signal abnormality in the L2 lamina on the left suspicious for additional metastatic disease.  Abnormality of the first sacral segment suspicious for metastatic disease.  Left adrenal gland appears enlarged.  30 to 40% height loss and L2 vertebral body suggestive of a subacute compression fracture.  Degenerative changes.     7/18/2022-PET/CT  Multiple hypermetabolic osseous lesions with index lesions  which have either increased in degree of FDG uptake or newly hypermetabolic lesions representative of metastatic disease and progression of disease.  Possible FDG avid lesion in the left femoral diaphysis however these are incompletely visualized and in the area of artifactual FDG uptake.  MRI of the left femur recommended for further evaluation.  Increasing FDG uptake of the left adrenal gland.  Focal left hilar hypermetabolic him corresponds to the lymph node which has minimally increased in size compared to April 2022.  New sub-6 mm pulmonary nodules.     7/26/2022-cycle 1 of Taxotere     Patient was admitted to the hospital 8/3/2022 discharged on 8/9/2022.  She was admitted for strokelike symptoms and confusion.  The confusion was thought to be secondary to polypharmacy and probably febrile neutropenia.  She was treated with antibiotics.  Mental status improved subsequently.  MRI of the cervical thoracic and lumbar spine was performed on 8/4/2022.  Images independently reviewed by me.  Multiple metastatic lesions in the spine noted.  There was a lesion on the CT which was concerning.  There is also a sacral Lesion measuring up to 5 cm in transverse dimension on the right.  Patient was symptomatic with pain on the right side of the sacrum.  Radiation oncology recommended radiation to the cervical spine as well as sacrum.  CT head without any obvious abnormalities.     Completed radiation to the cervical spine and sacrum on 8/12/2022    She was admitted to the hospital between 9/23/2022 to 10/12/2022.  She was admitted for poorly controlled pain as well as encephalopathy and multiple falls.  She was diagnosed with a UTI and treated with IV Rocephin.  Pain medications changed and also additional radiation to the right sacrum was performed.  3000 cGy in 10 fractions was administered.  She was discharged on oxycodone 15 mg every 4 hours as needed for pain and OxyContin twice daily.  She was recommended to go to a  subacute rehab however she preferred to be discharged home with home physical therapy and Occupational Therapy    9/24/2022-MRI of the pelvis showed osseous metastatic disease in the sacrum worst within the right and probable pathological fractures bilaterally.    10/4/2022 CT head without any evidence of metastatic disease or acute abnormalities.    10/25/2022-PET/CT-resolution of hypermetabolic activity in the upper lobe and left hilum.  Previously noted new tiny pulmonary nodules have resolved.  Resolution of hypermetabolic activity in the left adrenal gland.  Near complete resolution of hypermetabolic activity in the skeletal metastasis particularly the cervical spine, pelvic bones and proximal left femur.  Maximal SUV at the right sacral metastasis is 2.7 and previously it was 8.9.  Tiny focus of hypermetabolic activity at the GE junction with a maximal SUV of 4.7.  No definite thickening or abnormality noted on the CT.    11/15/2022-lumbar puncture with negative cytology of the CSF.    11/21/2022-MRI of the brain with contrast and MRI of the cervical spine with contrast without any evidence of metastatic disease.    1/12/2023-PET/CT  Hypermetabolic mediastinal lymphadenopathy and multiple liver lesions which are hypermetabolic suggestive of metastasis.  New tiny left pleural effusion is noted.  Progression of bilateral sacral fractures and right L5 transverse process fracture.  There is also an acute/subacute L1 fracture.  Lumbar spine x-rays have been recommended for evaluation of the vertebral height or lumbar MRI to be compared to the priors.    1/23/2023-MRI of the brain  At least 9 separate tiny 2 to 6 mm enhancing lesions in the brain compatible with multiple new brain metastasis.  There has been interval development of 3 separate foci of encephalomalacia concerning for chronic infarcts but these are new since August 2022.  Stable osseous metastasis at C2.    PHYSICAL EXAMINATION:    General: Oriented to  person, place, and time.  Ill-appearing.  Pale.  HEENT: Periorbital edema has resolved.  Chest/Lungs: Clear to auscultation bilaterally. Right chest mediport in place  Heart: RRR, sinus tachycardia  Abdomen/GI: soft, NT, ND, NABS  Extremities: Bilateral lower extremity without any swelling.  Decreased strength in both lower extremities.  No focal deficits.    I have reexamined the patient and the results are consistent with the previously documented exam. Salma Snell MD     DIAGNOSTIC DATA:  Results Review:     I reviewed the patient's new clinical results.    Results from last 7 days   Lab Units 02/21/23  0952   WBC 10*3/mm3 11.30*   HEMOGLOBIN g/dL 8.5*   HEMATOCRIT % 27.5*   PLATELETS 10*3/mm3 173     Lab Results   Component Value Date    NEUTROABS 10.31 (H) 02/21/2023                     IMAGING:      PET/CT 1/12/2023 and MRI of the brain 1/23/2023-images independently reviewed and interpreted by me.    ASSESSMENT:  This is a 57 y.o. female with:     *Metastatic non-small cell lung cancer  · She was initially diagnosed with stage III (T1N2M0) disease.    · She received initial therapy with cisplatin and Alimta concurrent with radiation therapy beginning 5/25/2021.    · There were indeterminate findings in the lumbar spine at L1 and it was recommended to monitor this over time.    · Patient completed 3 cycles cisplatin and Alimta 7/7/2021 and completed radiation therapy 7/12/2021.    · PET scan 8/6/2021 showed good response to treatment.    · MRI lumbar spine 10/1/2021 with increase in size of the L1 lesion.    · Biopsy of the L1 lesion on 10/14/2022 confirmed metastatic adenocarcinoma of lung origin, PD-L1 TPS 0.    · PET scan 10/26/2021 with involvement of left adrenal and L1/L2 only.    · Patient received radiation therapy to L1/L2 on 11/19/2021.  She also received radiation to the left adrenal gland.    · She initiated further systemic therapy on 11/22/2021 with Keytruda and Alimta.   · Follow-up PET  scan 1/18/2022 with decrease in small left upper lobe pulmonary nodule, resolution of activity at L1/L2, no new sites of disease.    · Guardant 360 CT DNA level was monitored and was decreasing.    · PET scan 4/11/2022 with progression in left adrenal and L1.    · Maintained systemic therapy with Keytruda and Alimta and received additional radiation to left adrenal and L1.  Alimta however held since 5/10/2022 due to renal dysfunction.  Radiation completed to lumbar spine 7/14/2022.    · PET scan 7/18/2022 with multiple areas of progression of the spine and right sacrum.    · Guardant 360 analysis 7/20/2022 with no actionable mutations.    · Change in therapy to single agent palliative Taxotere initiated 7/26/2022.    · Altered mental status and febrile neutropenia requiring hospitalization 8/2 - 8/9/2022.  During that admission, MRI brain, cervical, thoracic, lumbar spine performed 8/4/2022 in addition to CT cervical spine 8/5/2022.  There was evidence of C2 metastatic lesion with some dural enhancement, compression deformity at T7 with mild edema suggesting acute to subacute fracture, 5 mm T12 spinous process metastasis, multifocal disease in the lumbar spine and sacrum, largest involving sacral ala to the right 5 cm in transverse dimension.  Loss of height at L2 25%.  · Patient received cycle 3 Taxotere 9/6/2022 with Neulasta support.    · She has been continuing as well on Xgeva every 4 weeks (last received 9/6/2022).  · Patient did undergo MRI lumbar spine and pelvis on 9/24/2022.  MRI pelvis showed bilateral sacral fractures with marrow infiltration related to metastasis more prominent on the right.  Lesion on the right 3.5 x 4.1 x 4.3 cm.  Also probable metastasis along posterior acetabulum on the right 1.5 cm.  MRI lumbar spine with stable L1 metastasis, new edema endplate T12 possibly stress reaction versus developing new metastasis, lesion at T12 spinous process unchanged.   · Radiation oncology consulted  with plans for palliative treatment of the right sacral metastasis.  Treatment initiated 9/28/2022  · Completed radiation to the right sacral metastasis on 10/11/2022.  · PET/CT 10/25/2022 without any metabolic uptake in the lung or the skeletal metastasis.  This is indicative of a positive response to Taxotere.  · Pain persistent sitting and walking position and not present when she is laying down.  · Patient is still very active and performance status is very poor.  Due to this reason chemotherapy will be continued to be held.  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  · Due to this reason an MRI of the brain was obtained but unfortunately this was performed without contrast.  Reviewed the MRI and no evidence of metastatic disease.  · 11/15/2022 lumbar puncture shows no evidence of malignancy in the CSF, elevated protein at 65.2, glucose normal at 58, culture no growth in 3 days  · Repeat MRI of the brain with contrast 11/21/2022 does not show any obvious abnormalities.  There is no enhancement of the CSF.  MRI of the cervical spine with no abnormalities.  · Patient unable to undergo MRI of the thoracic and lumbar spine due to mental status and unable to being in 1 position as well as with incontinence.  · 1/4/2023-mental status has improved significantly.  Patient reports decreased oral intake and nausea and vomiting.  She is also reporting abdominal discomfort.  · 1/12/2023-PET/CT with mediastinal lymphadenopathy as well as multiple liver lesions suggestive of disease progression.  · 1/23/2023-MRI of the brain with at least 9 different foci of small mets measuring up to 6 mm.  There is also areas of encephalomalacia concerning for chronic infarcts.    · Resume Taxotere 1/31/2023.  Dose reduced to 60 mg per metered square.  · 2/21/2023-scheduled for cycle 2 of Taxotere and she has tolerated cycle 1 extremely well.  Proceed with  chemotherapy today.  Labs reviewed and stable to proceed     *History of bilateral stage I breast cancer  · Patient with bilateral breast cancer, both stage I ([id number]), grade 2, ER/UT positive and HER2/melinda negative with low Ki-67.    · Patient initiated adjnuvant letrozole in May 2021.  · Discontinue letrozole as she is currently on Taxotere.    *Mental status changes  · Patient's mental status is back at baseline.  · Significant improvement  · Currently off all psychotropic medications.  · Mental status back to baseline     *Cancer related pain  · Patient has been receiving Duragesic patch 50 mcg every 72 hours in addition to oxycodone 15 mg every 4 hours for breakthrough pain.  Duragesic dose had been escalated recently in the outpatient setting due to worsening pain  · On admission 9/23/2022, Duragesic patch increased to 75 mcg daily, added Decadron 4 mg IV every 6 hours with continuation of oxycodone 15 mg every 4 hours as needed for breakthrough pain in addition of Dilaudid 1 mg every 2 hours as needed for breakthrough pain.  · Patient with increasing side effects from narcotics, Duragesic decreased on 9/25/2022 from 75 down to 50 mcg patch every 72 hours.  · Dexamethasone dose decreased to 4 mg p.o. every 12 hours on 9/27/2022  · Initiated palliative radiation to the right sacrum on 9/28/2022  · Patient became confused after receiving Dilaudid.  Patient and family asked to discontinue Dilaudid.  · Duragesic patch dose was increased to 75 mcg/h on 9/30/2022. Subsequent decrease to 50 mcg due to confusion  · Gabapentin was discontinued due to the sedating effect.  · Oxycodone is being used for breakthrough pain.  · 10/3 transition to sustained release oxycodone 40 mg bid and fentanyl patch stopped  · Poorly controlled, pain particularly worse in sitting and standing position.  · Unsure if extends is contributing to mental status changes as previously patient had been very sensitive to pain meds.  · Due to this  reason we will discontinue the Xtampza ER and switch to OxyContin 20 mg twice daily.  · She continues to experience severe mental status changes.  · Long-acting pain medications discontinued due to mental status changes and she is currently on oxycodone 15 mg as needed.  · Reports better pain control after the epidural injection of the spine.  · Continue oxycodone as needed, pain well controlled at this time      *Anxiety/depression  · Currently all medications have been discontinued due to mental status changes  · She continues on Xanax as needed  · Anxiety well-controlled at this time     *Insomnia  · She has been receiving Xanax as needed  · Continue the same     *Anemia  · Hemoglobin lower at 8.5  · She may require a unit of PRBC      *Peripheral neuropathy  · Patient was on gabapentin 300 mg twice daily.  · Gabapentin was discontinued on 9/30/2022.  · Neuropathy stable     *Nausea vomiting and abdominal discomfort  · Continues to experience vomiting.  · Abdominal discomfort has resolved  · PET/CT with liver metastasis.  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  · Continue Compazine as well as Zofran.  They are currently alternating.  · Sancuso prescribed at last visit which has helped with the nausea and vomiting.  This has resolved.     *GI prophylaxis  · Continue omeprazole daily  · No changes    *Deconditioning  · Gradually improving  · She is able to ambulate much better now.    *Hypokalemia  · Potassium normal  · Continue potassium 20 mEq daily    *Brain metastasis  · Repeat MRI of the brain in March 2023  · Follow-up with radiation oncology after repeat MRI of the brain    RECOMMENDATIONS:  1. Taxotere today  2. MRI brain in 4 weeks and follow-up with radiation oncology subsequently  3. Continue potassium to 20 mEq daily  4. Continue Compazine and Zofran for nausea  5. Continue Sancuso    Patient is on chemotherapy requiring close monitoring for toxicities.    Salma Snell MD

## 2023-02-22 ENCOUNTER — INFUSION (OUTPATIENT)
Dept: ONCOLOGY | Facility: HOSPITAL | Age: 58
End: 2023-02-22
Payer: COMMERCIAL

## 2023-02-22 DIAGNOSIS — C79.51 NON-SMALL CELL LUNG CANCER METASTATIC TO BONE: Primary | ICD-10-CM

## 2023-02-22 DIAGNOSIS — C34.90 NON-SMALL CELL LUNG CANCER METASTATIC TO BONE: Primary | ICD-10-CM

## 2023-02-22 PROCEDURE — 25010000002 PEGFILGRASTIM-CBQV 6 MG/0.6ML SOLUTION PREFILLED SYRINGE: Performed by: INTERNAL MEDICINE

## 2023-02-22 PROCEDURE — 96372 THER/PROPH/DIAG INJ SC/IM: CPT

## 2023-02-22 RX ADMIN — PEGFILGRASTIM-CBQV 6 MG: 6 INJECTION, SOLUTION SUBCUTANEOUS at 13:20

## 2023-03-09 RX ORDER — POTASSIUM CHLORIDE 750 MG/1
CAPSULE, EXTENDED RELEASE ORAL
Qty: 60 CAPSULE | Refills: 1 | Status: SHIPPED | OUTPATIENT
Start: 2023-03-09

## 2023-03-09 NOTE — TELEPHONE ENCOUNTER
Caller: MILKABK KING    Relationship: Emergency Contact    Best call back number: [phone]    What is the best time to reach you: ANY    Who are you requesting to speak with (clinical staff, provider,  specific staff member): CLINICAL STAFF     Do you know the name of the person who called: BK -      What was the call regarding: PATIENT  BK CALLED CANCELLED APPOINTMENT FOR 3/10/23 AND STATED IF ANY LABS NEEDED TO SEND TO HER CANCER DOCTOR.      Do you require a callback: NO

## 2023-03-14 ENCOUNTER — INFUSION (OUTPATIENT)
Dept: ONCOLOGY | Facility: HOSPITAL | Age: 58
End: 2023-03-14
Payer: COMMERCIAL

## 2023-03-14 ENCOUNTER — OFFICE VISIT (OUTPATIENT)
Dept: ONCOLOGY | Facility: CLINIC | Age: 58
End: 2023-03-14
Payer: COMMERCIAL

## 2023-03-14 VITALS
BODY MASS INDEX: 20.44 KG/M2 | OXYGEN SATURATION: 98 % | HEIGHT: 69 IN | WEIGHT: 138 LBS | DIASTOLIC BLOOD PRESSURE: 73 MMHG | HEART RATE: 95 BPM | RESPIRATION RATE: 16 BRPM | TEMPERATURE: 96.4 F | SYSTOLIC BLOOD PRESSURE: 113 MMHG

## 2023-03-14 DIAGNOSIS — C34.90 NON-SMALL CELL LUNG CANCER METASTATIC TO BONE: ICD-10-CM

## 2023-03-14 DIAGNOSIS — Z45.2 ENCOUNTER FOR FITTING AND ADJUSTMENT OF VASCULAR CATHETER: ICD-10-CM

## 2023-03-14 DIAGNOSIS — Z17.0 MALIGNANT NEOPLASM OF UPPER-OUTER QUADRANT OF RIGHT BREAST IN FEMALE, ESTROGEN RECEPTOR POSITIVE: ICD-10-CM

## 2023-03-14 DIAGNOSIS — C79.51 NON-SMALL CELL LUNG CANCER METASTATIC TO BONE: ICD-10-CM

## 2023-03-14 DIAGNOSIS — C50.411 MALIGNANT NEOPLASM OF UPPER-OUTER QUADRANT OF RIGHT BREAST IN FEMALE, ESTROGEN RECEPTOR POSITIVE: ICD-10-CM

## 2023-03-14 DIAGNOSIS — C34.90 NON-SMALL CELL LUNG CANCER METASTATIC TO BONE: Primary | ICD-10-CM

## 2023-03-14 DIAGNOSIS — C79.51 NON-SMALL CELL LUNG CANCER METASTATIC TO BONE: Primary | ICD-10-CM

## 2023-03-14 LAB
ABO GROUP BLD: NORMAL
ALBUMIN SERPL-MCNC: 3.6 G/DL (ref 3.5–5.2)
ALBUMIN/GLOB SERPL: 1.7 G/DL (ref 1.1–2.4)
ALP SERPL-CCNC: 131 U/L (ref 38–116)
ALT SERPL W P-5'-P-CCNC: 47 U/L (ref 0–33)
ANION GAP SERPL CALCULATED.3IONS-SCNC: 12.4 MMOL/L (ref 5–15)
AST SERPL-CCNC: 24 U/L (ref 0–32)
BASOPHILS # BLD AUTO: 0.04 10*3/MM3 (ref 0–0.2)
BASOPHILS NFR BLD AUTO: 0.2 % (ref 0–1.5)
BILIRUB SERPL-MCNC: <0.2 MG/DL (ref 0.2–1.2)
BLD GP AB SCN SERPL QL: NEGATIVE
BUN SERPL-MCNC: 24 MG/DL (ref 6–20)
BUN/CREAT SERPL: 21.1 (ref 7.3–30)
CALCIUM SPEC-SCNC: 9.2 MG/DL (ref 8.5–10.2)
CHLORIDE SERPL-SCNC: 104 MMOL/L (ref 98–107)
CO2 SERPL-SCNC: 21.6 MMOL/L (ref 22–29)
CREAT SERPL-MCNC: 1.14 MG/DL (ref 0.6–1.1)
DEPRECATED RDW RBC AUTO: 66.4 FL (ref 37–54)
EGFRCR SERPLBLD CKD-EPI 2021: 55.6 ML/MIN/1.73
EOSINOPHIL # BLD AUTO: 0.01 10*3/MM3 (ref 0–0.4)
EOSINOPHIL NFR BLD AUTO: 0 % (ref 0.3–6.2)
ERYTHROCYTE [DISTWIDTH] IN BLOOD BY AUTOMATED COUNT: 18.5 % (ref 12.3–15.4)
FERRITIN SERPL-MCNC: 189.7 NG/ML (ref 11–207)
FOLATE SERPL-MCNC: >20 NG/ML (ref 4.78–24.2)
GLOBULIN UR ELPH-MCNC: 2.1 GM/DL (ref 1.8–3.5)
GLUCOSE SERPL-MCNC: 109 MG/DL (ref 74–124)
HCT VFR BLD AUTO: 26.1 % (ref 34–46.6)
HGB BLD-MCNC: 7.6 G/DL (ref 12–15.9)
IMM GRANULOCYTES # BLD AUTO: 0.97 10*3/MM3 (ref 0–0.05)
IMM GRANULOCYTES NFR BLD AUTO: 3.7 % (ref 0–0.5)
IRON 24H UR-MRATE: 78 MCG/DL (ref 37–145)
IRON SATN MFR SERPL: 24 % (ref 14–48)
LYMPHOCYTES # BLD AUTO: 0.5 10*3/MM3 (ref 0.7–3.1)
LYMPHOCYTES NFR BLD AUTO: 1.9 % (ref 19.6–45.3)
MCH RBC QN AUTO: 29.7 PG (ref 26.6–33)
MCHC RBC AUTO-ENTMCNC: 29.1 G/DL (ref 31.5–35.7)
MCV RBC AUTO: 102 FL (ref 79–97)
MONOCYTES # BLD AUTO: 0.69 10*3/MM3 (ref 0.1–0.9)
MONOCYTES NFR BLD AUTO: 2.7 % (ref 5–12)
NEUTROPHILS NFR BLD AUTO: 23.66 10*3/MM3 (ref 1.7–7)
NEUTROPHILS NFR BLD AUTO: 91.5 % (ref 42.7–76)
NRBC BLD AUTO-RTO: 0.1 /100 WBC (ref 0–0.2)
PLATELET # BLD AUTO: 184 10*3/MM3 (ref 140–450)
PMV BLD AUTO: 9.2 FL (ref 6–12)
POTASSIUM SERPL-SCNC: 4.9 MMOL/L (ref 3.5–4.7)
PROT SERPL-MCNC: 5.7 G/DL (ref 6.3–8)
RBC # BLD AUTO: 2.56 10*6/MM3 (ref 3.77–5.28)
RH BLD: POSITIVE
SODIUM SERPL-SCNC: 138 MMOL/L (ref 134–145)
T&S EXPIRATION DATE: NORMAL
TIBC SERPL-MCNC: 329 MCG/DL (ref 249–505)
TRANSFERRIN SERPL-MCNC: 235 MG/DL (ref 200–360)
VIT B12 BLD-MCNC: 376 PG/ML (ref 211–946)
WBC NRBC COR # BLD: 25.87 10*3/MM3 (ref 3.4–10.8)

## 2023-03-14 PROCEDURE — 86923 COMPATIBILITY TEST ELECTRIC: CPT

## 2023-03-14 PROCEDURE — 25010000002 HEPARIN LOCK FLUSH PER 10 UNITS: Performed by: INTERNAL MEDICINE

## 2023-03-14 PROCEDURE — 25010000002 DIPHENHYDRAMINE PER 50 MG: Performed by: INTERNAL MEDICINE

## 2023-03-14 PROCEDURE — 85025 COMPLETE CBC W/AUTO DIFF WBC: CPT

## 2023-03-14 PROCEDURE — 82746 ASSAY OF FOLIC ACID SERUM: CPT | Performed by: INTERNAL MEDICINE

## 2023-03-14 PROCEDURE — 86900 BLOOD TYPING SEROLOGIC ABO: CPT | Performed by: INTERNAL MEDICINE

## 2023-03-14 PROCEDURE — 36415 COLL VENOUS BLD VENIPUNCTURE: CPT

## 2023-03-14 PROCEDURE — 96413 CHEMO IV INFUSION 1 HR: CPT

## 2023-03-14 PROCEDURE — 99215 OFFICE O/P EST HI 40 MIN: CPT | Performed by: INTERNAL MEDICINE

## 2023-03-14 PROCEDURE — 25010000002 DOCETAXEL 20 MG/ML SOLUTION 8 ML VIAL: Performed by: INTERNAL MEDICINE

## 2023-03-14 PROCEDURE — 25010000002 GRANISETRON PER 100 MCG: Performed by: INTERNAL MEDICINE

## 2023-03-14 PROCEDURE — 86901 BLOOD TYPING SEROLOGIC RH(D): CPT | Performed by: INTERNAL MEDICINE

## 2023-03-14 PROCEDURE — 83540 ASSAY OF IRON: CPT

## 2023-03-14 PROCEDURE — 84466 ASSAY OF TRANSFERRIN: CPT

## 2023-03-14 PROCEDURE — 82607 VITAMIN B-12: CPT | Performed by: INTERNAL MEDICINE

## 2023-03-14 PROCEDURE — 82728 ASSAY OF FERRITIN: CPT

## 2023-03-14 PROCEDURE — 86850 RBC ANTIBODY SCREEN: CPT | Performed by: INTERNAL MEDICINE

## 2023-03-14 PROCEDURE — 96375 TX/PRO/DX INJ NEW DRUG ADDON: CPT

## 2023-03-14 PROCEDURE — 80053 COMPREHEN METABOLIC PANEL: CPT

## 2023-03-14 RX ORDER — HEPARIN SODIUM (PORCINE) LOCK FLUSH IV SOLN 100 UNIT/ML 100 UNIT/ML
500 SOLUTION INTRAVENOUS AS NEEDED
Status: CANCELLED | OUTPATIENT
Start: 2023-03-14

## 2023-03-14 RX ORDER — SODIUM CHLORIDE 0.9 % (FLUSH) 0.9 %
10 SYRINGE (ML) INJECTION AS NEEDED
Status: CANCELLED | OUTPATIENT
Start: 2023-03-14

## 2023-03-14 RX ORDER — DIPHENHYDRAMINE HYDROCHLORIDE 50 MG/ML
50 INJECTION INTRAMUSCULAR; INTRAVENOUS AS NEEDED
Status: CANCELLED | OUTPATIENT
Start: 2023-03-14

## 2023-03-14 RX ORDER — FAMOTIDINE 10 MG/ML
20 INJECTION, SOLUTION INTRAVENOUS AS NEEDED
Status: CANCELLED | OUTPATIENT
Start: 2023-03-14

## 2023-03-14 RX ORDER — SODIUM CHLORIDE 9 MG/ML
250 INJECTION, SOLUTION INTRAVENOUS AS NEEDED
Status: CANCELLED | OUTPATIENT
Start: 2023-03-14

## 2023-03-14 RX ORDER — SODIUM CHLORIDE 9 MG/ML
250 INJECTION, SOLUTION INTRAVENOUS ONCE
Status: COMPLETED | OUTPATIENT
Start: 2023-03-14 | End: 2023-03-14

## 2023-03-14 RX ORDER — ACETAMINOPHEN 325 MG/1
650 TABLET ORAL ONCE
Status: CANCELLED | OUTPATIENT
Start: 2023-03-14 | End: 2023-03-14

## 2023-03-14 RX ORDER — GRANISETRON HYDROCHLORIDE 1 MG/ML
1 INJECTION INTRAVENOUS ONCE
Status: COMPLETED | OUTPATIENT
Start: 2023-03-14 | End: 2023-03-14

## 2023-03-14 RX ORDER — DIPHENHYDRAMINE HCL 25 MG
25 CAPSULE ORAL ONCE
Status: CANCELLED | OUTPATIENT
Start: 2023-03-14 | End: 2023-03-14

## 2023-03-14 RX ORDER — HEPARIN SODIUM (PORCINE) LOCK FLUSH IV SOLN 100 UNIT/ML 100 UNIT/ML
500 SOLUTION INTRAVENOUS AS NEEDED
Status: DISCONTINUED | OUTPATIENT
Start: 2023-03-14 | End: 2023-03-14 | Stop reason: HOSPADM

## 2023-03-14 RX ORDER — OXYCODONE HYDROCHLORIDE 15 MG/1
15 TABLET ORAL EVERY 4 HOURS PRN
Qty: 180 TABLET | Refills: 0 | Status: SHIPPED | OUTPATIENT
Start: 2023-03-14

## 2023-03-14 RX ORDER — SODIUM CHLORIDE 9 MG/ML
250 INJECTION, SOLUTION INTRAVENOUS ONCE
Status: CANCELLED | OUTPATIENT
Start: 2023-03-14

## 2023-03-14 RX ORDER — FAMOTIDINE 10 MG/ML
20 INJECTION, SOLUTION INTRAVENOUS ONCE
Status: CANCELLED | OUTPATIENT
Start: 2023-03-14

## 2023-03-14 RX ORDER — GRANISETRON HYDROCHLORIDE 1 MG/ML
1 INJECTION INTRAVENOUS ONCE
Status: CANCELLED
Start: 2023-03-14 | End: 2023-03-14

## 2023-03-14 RX ORDER — SODIUM CHLORIDE 0.9 % (FLUSH) 0.9 %
10 SYRINGE (ML) INJECTION AS NEEDED
Status: DISCONTINUED | OUTPATIENT
Start: 2023-03-14 | End: 2023-03-14 | Stop reason: HOSPADM

## 2023-03-14 RX ORDER — FAMOTIDINE 10 MG/ML
20 INJECTION, SOLUTION INTRAVENOUS ONCE
Status: COMPLETED | OUTPATIENT
Start: 2023-03-14 | End: 2023-03-14

## 2023-03-14 RX ADMIN — SODIUM CHLORIDE 250 ML: 9 INJECTION, SOLUTION INTRAVENOUS at 10:21

## 2023-03-14 RX ADMIN — GRANISETRON HYDROCHLORIDE 1 MG: 1 INJECTION INTRAVENOUS at 10:21

## 2023-03-14 RX ADMIN — Medication 500 UNITS: at 12:29

## 2023-03-14 RX ADMIN — DOCETAXEL 95 MG: 20 INJECTION, SOLUTION, CONCENTRATE INTRAVENOUS at 11:26

## 2023-03-14 RX ADMIN — DIPHENHYDRAMINE HYDROCHLORIDE 25 MG: 50 INJECTION, SOLUTION INTRAMUSCULAR; INTRAVENOUS at 10:22

## 2023-03-14 RX ADMIN — Medication 10 ML: at 12:29

## 2023-03-14 RX ADMIN — FAMOTIDINE 20 MG: 10 INJECTION INTRAVENOUS at 10:21

## 2023-03-14 NOTE — NURSING NOTE
Per Dr. Snell pt to proceed with taxotere today. Pt to receive blood transfusion for Hgb of 7.6.  Pharmacy also notified.  Type and screen collected and yellow fenwall band placed on pt.  Pt instructed to keep yellow band on until transfusion is completed.  Both pt and  v/u.

## 2023-03-14 NOTE — PROGRESS NOTES
[facility] INPATIENT PROGRESS NOTE    Length of Stay:  0 days    CHIEF COMPLAINT/REASON FOR VISIT:  Metastatic non-small cell lung cancer  Bilateral breast cancer    Interval history  Marcelle presents today for cycle 3 of Taxotere.  She reports a good appetite and weight has gone up.  She had an epidural injection and this is helped significantly with the back pain.  She is able to ambulate with the help of a walker but does struggle with neuropathy and is worried about falls.  Denies any headache or blurry vision.  No nausea or vomiting.  No diarrhea.    Oncologic history  Ms. 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     3/1/2021-bilateral screening mammogram-microcalcifications seen in the posterior one third of the lateral aspect of the right breast.  Area of focal asymmetry seen in the middle one third of the upper inner quadrant of the left breast.     3/1/2021-DEXA scan-T score of -2.2 on the lumbar spine and T score of -2.3 in the left hip and T score of -1.9 in the right hip.  Findings consistent with osteopenia     3/23/2021-screening lung CT-there is a 10 x 11 mm solid nodule in the left upper lobe.  Enlarged AP window lymph node measuring 14 x 10 mm.  Suggestion of a possible 9 mm left hilar lymph node.  Heavy coronary artery calcification.     3/23/2021-diagnostic mammogram bilateral-cluster of microcalcifications in the middle third lateral aspect of the right breast.  Left breast demonstrates persistence of the area of focal asymmetry in the region.     Ultrasound-left breast ultrasound at 10 o'clock position, 6 cm from the nipple there is a 0.4 cm irregular hypoechoic lesion.  Stereotactic biopsy of the right breast calcifications recommended.  Ultrasound-guided biopsy of the left breast lesion recommended     4/7/2021-right breast stereotactic biopsy and left breast ultrasound-guided  biopsy  Pathology  Right breast-invasive ductal carcinoma, grade 2, lymphovascular invasion present, ER +99% strong, AK +80% moderate, HER-2 negative, Ki-67 12%  Left breast upper inner quadrant 10 o'clock position biopsy, invasive ductal carcinoma, grade   2, ER +99% strong, AK +40% strong, HER-2 2+ on immunohistochemistry, nonamplified on FISH Ki-67 10%     4/14/2021-PET/CT-FDG avid aortopulmonary window lymph node measures 0.9 cm with an SUV of 7.5.  FDG avid left hilar lymph node measures 1 cm with an SUV of 17.2.  Irregular soft tissue density in the right breast measuring 3.7 x 3.8 cm is favored to be secondary to the recent breast biopsy.  1.1 cm pulmonary nodule within the left upper lobe with SUV 9.7 .  Sub-6 mm pulmonary nodules are present in the right lower lobe.  No evidence of lymphadenopathy or metastatic disease in the abdomen.  Focal area of FDG uptake within the central canal posterior to T11-T12 displaced demonstrating an SUV of 5.5 thought to be reactive however MRI is recommended for further evaluation.     She was seen by Dr. Molina who initially planned for breast surgery however  due to the PET abnormalities she has been referred to Dr. Kerr who plans to do a wound on 4/30/2021.  Dr. Molina's and Dr. Kerr's notes reviewed.     Patient denies any family history of breast cancer.  Her mother had lymphoma.  Maternal grandmother had colon cancer.  Prior to that screening mammogram she did not have any palpable abnormalities of either breast.     She has been a heavy smoker for about 40 years and smoked 2 packs/day.  Denies any recent weight changes, new bone pains, cough, abdominal pain nausea vomiting constipation or diarrhea.  She does have anxiety and has been on several medications.  She has recently been started on Xanax to help with the mood and also with insomnia.     Patient had a video-assisted thoracoscopy and mediastinal lymphadenectomy on 4/30/2021.  L5-L6 lymph nodes were  biopsied however the small pulmonary nodule in the left upper lobe was not difficult to find.  Pathology showed moderately differentiated adenocarcinoma which is CK7 and TTF-1 positive.  Consistent with lung primary.  Ki-67 85%.     5/14/2021-MRI of the brain-minimal chronic small vessel ischemic change in the white matter.  Otherwise negative MRI.     5/20/2021-bilateral axillary ultrasound-no evidence of axillary lymphadenopathy in either axilla.  Normal-appearing bilateral axillary lymph nodes visualized.     Cycle 1 cisplatin and Alimta on 5/25/2021.     Cycle 2 cisplatin Alimta 6/15/2021     Cycle 3 cisplatin Alimta 7/7/2021     Completed radiation on 7/12/2021     Port was nonfunctional hence a port study was performed which showed that the port was backed up into the innominate vein and also there was a nonocclusive thrombus at the end of the catheter extending into the superior vena cava.  She was started on anticoagulation with Eliquis.  It was recommended for port revision of the intention to keep the port.     PET/CT 8/5/2021-images independently reviewed and interpreted by me-decrease in size and FDG uptake of the left upper lobe pulmonary nodule as well as mediastinal and left hilar lymphadenopathy representing response to treatment.  Sub-6 mm pulmonary nodule in the left upper lobe new since 4/14/2021.  This could be related to radiation however follow-up CT in 3 months recommended.  Decrease in size of the irregular masslike tissue in the right breast which is postbiopsy hematoma.  This is not PET avid.  New segmental left eighth rib fractures healing.  A new band of sclerosis of the left seventh rib which favored to represent healing nondisplaced fracture.  Follow-up CT recommended.  focal uptake in the duodenum first and second portions.  Could be related to duodenitis.  Indeterminate lesion in the L1 vertebral body not well evaluated on PET/CT.     10/1/2021-MRI of the lumbar spine.  Lesion in the  left posterior body of L1 measures 14 x 14 x 12 mm and previously 5 x 5 x 6 mm.  The interval enlargement strongly suggest that it is metastatic lesion.  No additional osseous metastasis noted.   Other chronic changes noted.     10/14/2021-biopsy of the lumbar spine lesion-pathology consistent with poorly differentiated carcinoma.  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     10/26/2021-brain MRI-no evidence of metastatic disease.     10/26/2021-bilateral diagnostic mammogram and ultrasound  Scattered fibroglandular density in both breast.  Postbiopsy hematoma with internal biopsy clip in the upper outer quadrant of the right breast, 8 cm from the nipple.  The hematoma size is decreased to 2.9 cm from 3.6 cm earlier.     Left breast-parenchymal density measuring 9 x 10 mm has markedly decreased.  Few adjacent calcifications which are unchanged.  No new abnormality in the left breast.  At 10:00 region there is some minimal adjacent hypoechoic texture which is difficult to measure but appears smaller since the previous exam.     10/28/2021-PET/CT  New L1 and L2 lytic bone metastasis annually intensely hypermetabolic focus at the left adrenal gland likely represents metastatic disease as well.  Slight decrease in size of the 0.9 x 0.7 cm left upper lobe pulmonary nodule.  There is hypermetabolic activity related to radiation pneumonitis.  The remainder of the nodule is photopenic.  Uncertain etiology of the more intense activity along the right lateral peripheral margin of the 2.6 cm postbiopsy density of the right breast.     She completed radiation to to the lumbar spine on 11/19/2021 11/22/2021-cycle 1 Alimta and Keytruda     3/23/2022-bilateral diagnostic mammogram and ultrasound  Complete resolution of the microcalcifications in the upper outer quadrant of the right breast and decrease in size of the postbiopsy hematoma.  No  suspicious abnormalities in the right breast.  Benign-appearing calcifications at the site of malignancy in the left breast upper inner quadrant.  No other suspicious findings.     4/11/2022 PET/CT-there is new groundglass opacities in the left upper lobe which are most likely postradiation changes.  Other groundglass opacities in the left lung as well as the right lung remained stable.  Increased nodularity of the left adrenal gland with an SUV of 5.6, previously 3.5.    Increased uptake in the L1 vertebra in the right posterior aspect with an SUV of 3.6.  Left L1 sclerosis increased     Due to this the case was discussed in multidisciplinary conference.  The disease progression was significant in the left adrenal gland as well as the L1 vertebra.  Since there were only 2 areas of disease progression it has been decided to proceed with radiation to these 2 spots and continue on Keytruda and Alimta.     Patient also has had worsening of her kidney function.  We initially held treatment as of 5/31/2022 and creatinine bumped up to 2.0, BUN 23.  We then resumed therapy 6/11/2022 with Keytruda alone.  Patient was referred to nephrology.     6/6/2022-MRI of the spine-diffuse marrow replacement in the L1 vertebral body and inferior endplate concavity.  Suspicious for metastatic disease with pathological fracture in the inferior endplate.  Also diffuse signal abnormality in the L2 lamina on the left suspicious for additional metastatic disease.  Abnormality of the first sacral segment suspicious for metastatic disease.  Left adrenal gland appears enlarged.  30 to 40% height loss and L2 vertebral body suggestive of a subacute compression fracture.  Degenerative changes.     7/18/2022-PET/CT  Multiple hypermetabolic osseous lesions with index lesions which have either increased in degree of FDG uptake or newly hypermetabolic lesions representative of metastatic disease and progression of disease.  Possible FDG avid lesion in  the left femoral diaphysis however these are incompletely visualized and in the area of artifactual FDG uptake.  MRI of the left femur recommended for further evaluation.  Increasing FDG uptake of the left adrenal gland.  Focal left hilar hypermetabolic him corresponds to the lymph node which has minimally increased in size compared to April 2022.  New sub-6 mm pulmonary nodules.     7/26/2022-cycle 1 of Taxotere     Patient was admitted to the hospital 8/3/2022 discharged on 8/9/2022.  She was admitted for strokelike symptoms and confusion.  The confusion was thought to be secondary to polypharmacy and probably febrile neutropenia.  She was treated with antibiotics.  Mental status improved subsequently.  MRI of the cervical thoracic and lumbar spine was performed on 8/4/2022.  Images independently reviewed by me.  Multiple metastatic lesions in the spine noted.  There was a lesion on the CT which was concerning.  There is also a sacral Lesion measuring up to 5 cm in transverse dimension on the right.  Patient was symptomatic with pain on the right side of the sacrum.  Radiation oncology recommended radiation to the cervical spine as well as sacrum.  CT head without any obvious abnormalities.     Completed radiation to the cervical spine and sacrum on 8/12/2022    She was admitted to the hospital between 9/23/2022 to 10/12/2022.  She was admitted for poorly controlled pain as well as encephalopathy and multiple falls.  She was diagnosed with a UTI and treated with IV Rocephin.  Pain medications changed and also additional radiation to the right sacrum was performed.  3000 cGy in 10 fractions was administered.  She was discharged on oxycodone 15 mg every 4 hours as needed for pain and OxyContin twice daily.  She was recommended to go to a subacute rehab however she preferred to be discharged home with home physical therapy and Occupational Therapy    9/24/2022-MRI of the pelvis showed osseous metastatic disease in  the sacrum worst within the right and probable pathological fractures bilaterally.    10/4/2022 CT head without any evidence of metastatic disease or acute abnormalities.    10/25/2022-PET/CT-resolution of hypermetabolic activity in the upper lobe and left hilum.  Previously noted new tiny pulmonary nodules have resolved.  Resolution of hypermetabolic activity in the left adrenal gland.  Near complete resolution of hypermetabolic activity in the skeletal metastasis particularly the cervical spine, pelvic bones and proximal left femur.  Maximal SUV at the right sacral metastasis is 2.7 and previously it was 8.9.  Tiny focus of hypermetabolic activity at the GE junction with a maximal SUV of 4.7.  No definite thickening or abnormality noted on the CT.    11/15/2022-lumbar puncture with negative cytology of the CSF.    11/21/2022-MRI of the brain with contrast and MRI of the cervical spine with contrast without any evidence of metastatic disease.    1/12/2023-PET/CT  Hypermetabolic mediastinal lymphadenopathy and multiple liver lesions which are hypermetabolic suggestive of metastasis.  New tiny left pleural effusion is noted.  Progression of bilateral sacral fractures and right L5 transverse process fracture.  There is also an acute/subacute L1 fracture.  Lumbar spine x-rays have been recommended for evaluation of the vertebral height or lumbar MRI to be compared to the priors.    1/23/2023-MRI of the brain  At least 9 separate tiny 2 to 6 mm enhancing lesions in the brain compatible with multiple new brain metastasis.  There has been interval development of 3 separate foci of encephalomalacia concerning for chronic infarcts but these are new since August 2022.  Stable osseous metastasis at C2.    PHYSICAL EXAMINATION:    General: Oriented to person, place, and time.  Ill-appearing.  Pale.  HEENT: Periorbital edema has resolved.  Chest/Lungs: Clear to auscultation bilaterally. Right chest mediport in place  Heart:  RRR, sinus tachycardia  Abdomen/GI: soft, NT, ND, NABS  Extremities: Bilateral lower extremity without any swelling.  Decreased strength in both lower extremities.  No focal deficits.    I have reexamined the patient and the results are consistent with the previously documented exam. Salma Snell MD     DIAGNOSTIC DATA:  Results Review:     I reviewed the patient's new clinical results.    Results from last 7 days   Lab Units 03/14/23  0840   WBC 10*3/mm3 25.87*   HEMOGLOBIN g/dL 7.6*   HEMATOCRIT % 26.1*   PLATELETS 10*3/mm3 184     Lab Results   Component Value Date    NEUTROABS 23.66 (H) 03/14/2023                     IMAGING:      PET/CT 1/12/2023 and MRI of the brain 1/23/2023-images independently reviewed and interpreted by me.    ASSESSMENT:  This is a 57 y.o. female with:     *Metastatic non-small cell lung cancer  · She was initially diagnosed with stage III (T1N2M0) disease.    · She received initial therapy with cisplatin and Alimta concurrent with radiation therapy beginning 5/25/2021.    · There were indeterminate findings in the lumbar spine at L1 and it was recommended to monitor this over time.    · Patient completed 3 cycles cisplatin and Alimta 7/7/2021 and completed radiation therapy 7/12/2021.    · PET scan 8/6/2021 showed good response to treatment.    · MRI lumbar spine 10/1/2021 with increase in size of the L1 lesion.    · Biopsy of the L1 lesion on 10/14/2022 confirmed metastatic adenocarcinoma of lung origin, PD-L1 TPS 0.    · PET scan 10/26/2021 with involvement of left adrenal and L1/L2 only.    · Patient received radiation therapy to L1/L2 on 11/19/2021.  She also received radiation to the left adrenal gland.    · She initiated further systemic therapy on 11/22/2021 with Keytruda and Alimta.   · Follow-up PET scan 1/18/2022 with decrease in small left upper lobe pulmonary nodule, resolution of activity at L1/L2, no new sites of disease.    · Guardant 360 CT DNA level was monitored and  was decreasing.    · PET scan 4/11/2022 with progression in left adrenal and L1.    · Maintained systemic therapy with Keytruda and Alimta and received additional radiation to left adrenal and L1.  Alimta however held since 5/10/2022 due to renal dysfunction.  Radiation completed to lumbar spine 7/14/2022.    · PET scan 7/18/2022 with multiple areas of progression of the spine and right sacrum.    · Guardant 360 analysis 7/20/2022 with no actionable mutations.    · Change in therapy to single agent palliative Taxotere initiated 7/26/2022.    · Altered mental status and febrile neutropenia requiring hospitalization 8/2 - 8/9/2022.  During that admission, MRI brain, cervical, thoracic, lumbar spine performed 8/4/2022 in addition to CT cervical spine 8/5/2022.  There was evidence of C2 metastatic lesion with some dural enhancement, compression deformity at T7 with mild edema suggesting acute to subacute fracture, 5 mm T12 spinous process metastasis, multifocal disease in the lumbar spine and sacrum, largest involving sacral ala to the right 5 cm in transverse dimension.  Loss of height at L2 25%.  · Patient received cycle 3 Taxotere 9/6/2022 with Neulasta support.    · She has been continuing as well on Xgeva every 4 weeks (last received 9/6/2022).  · Patient did undergo MRI lumbar spine and pelvis on 9/24/2022.  MRI pelvis showed bilateral sacral fractures with marrow infiltration related to metastasis more prominent on the right.  Lesion on the right 3.5 x 4.1 x 4.3 cm.  Also probable metastasis along posterior acetabulum on the right 1.5 cm.  MRI lumbar spine with stable L1 metastasis, new edema endplate T12 possibly stress reaction versus developing new metastasis, lesion at T12 spinous process unchanged.   · Radiation oncology consulted with plans for palliative treatment of the right sacral metastasis.  Treatment initiated 9/28/2022  · Completed radiation to the right sacral metastasis on 10/11/2022.  · PET/CT  10/25/2022 without any metabolic uptake in the lung or the skeletal metastasis.  This is indicative of a positive response to Taxotere.  · Pain persistent sitting and walking position and not present when she is laying down.  · Patient is still very active and performance status is very poor.  Due to this reason chemotherapy will be continued to be held.  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  · Due to this reason an MRI of the brain was obtained but unfortunately this was performed without contrast.  Reviewed the MRI and no evidence of metastatic disease.  · 11/15/2022 lumbar puncture shows no evidence of malignancy in the CSF, elevated protein at 65.2, glucose normal at 58, culture no growth in 3 days  · Repeat MRI of the brain with contrast 11/21/2022 does not show any obvious abnormalities.  There is no enhancement of the CSF.  MRI of the cervical spine with no abnormalities.  · Patient unable to undergo MRI of the thoracic and lumbar spine due to mental status and unable to being in 1 position as well as with incontinence.  · 1/4/2023-mental status has improved significantly.  Patient reports decreased oral intake and nausea and vomiting.  She is also reporting abdominal discomfort.  · 1/12/2023-PET/CT with mediastinal lymphadenopathy as well as multiple liver lesions suggestive of disease progression.  · 1/23/2023-MRI of the brain with at least 9 different foci of small mets measuring up to 6 mm.  There is also areas of encephalomalacia concerning for chronic infarcts.    · Resume Taxotere 1/31/2023.  Dose reduced to 60 mg per metered square.  · 2/21/2023-scheduled for cycle 2 of Taxotere and she has tolerated cycle 1 extremely well.  Proceed with chemotherapy today.  Labs reviewed and stable to proceed  · 3/14/2023-cycle 3 Taxotere, hemoglobin low at 7.6, we will arrange for 1 unit of PRBC. CMP reviewed and creatinine 1.14,  potassium 4.9, ALT 47, AST normal at 24 and alkaline phosphatase 131     *History of bilateral stage I breast cancer  · Patient with bilateral breast cancer, both stage I ([id number]), grade 2, ER/KS positive and HER2/melinda negative with low Ki-67.    · Patient initiated adjnuvant letrozole in May 2021.  · Letrozole discontinued as she is on Taxotere    *Mental status changes  · Patient's mental status is back at baseline.  · Significant improvement  · Currently off all psychotropic medications.  · Mental status at baseline.  · MRI scheduled for 3/28/2023     *Cancer related pain  · Patient has been receiving Duragesic patch 50 mcg every 72 hours in addition to oxycodone 15 mg every 4 hours for breakthrough pain.  Duragesic dose had been escalated recently in the outpatient setting due to worsening pain  · On admission 9/23/2022, Duragesic patch increased to 75 mcg daily, added Decadron 4 mg IV every 6 hours with continuation of oxycodone 15 mg every 4 hours as needed for breakthrough pain in addition of Dilaudid 1 mg every 2 hours as needed for breakthrough pain.  · Patient with increasing side effects from narcotics, Duragesic decreased on 9/25/2022 from 75 down to 50 mcg patch every 72 hours.  · Dexamethasone dose decreased to 4 mg p.o. every 12 hours on 9/27/2022  · Initiated palliative radiation to the right sacrum on 9/28/2022  · Patient became confused after receiving Dilaudid.  Patient and family asked to discontinue Dilaudid.  · Duragesic patch dose was increased to 75 mcg/h on 9/30/2022. Subsequent decrease to 50 mcg due to confusion  · Gabapentin was discontinued due to the sedating effect.  · Oxycodone is being used for breakthrough pain.  · 10/3 transition to sustained release oxycodone 40 mg bid and fentanyl patch stopped  · Poorly controlled, pain particularly worse in sitting and standing position.  · Unsure if extends is contributing to mental status changes as previously patient had been very sensitive  to pain meds.  · Due to this reason we will discontinue the Xtampza ER and switch to OxyContin 20 mg twice daily.  · She continues to experience severe mental status changes.  · Long-acting pain medications discontinued due to mental status changes and she is currently on oxycodone 15 mg as needed.  · Reports better pain control after the epidural injection of the spine.  · Continue oxycodone as needed      *Anxiety/depression  · Currently all medications have been discontinued due to mental status changes  · Continue Xanax as needed  · Anxiety well-controlled at this time     *Insomnia  · She has been receiving Xanax as needed  · Continue the     *Anemia  · Hemoglobin 7.6  · Obtain anemia work-up  · 1 unit of PRBC     *Peripheral neuropathy  · Patient was on gabapentin 300 mg twice daily.  · Gabapentin was discontinued on 9/30/2022.  · Neuropathy stable     *Nausea vomiting and abdominal discomfort  · Continues to experience vomiting.  · Abdominal discomfort has resolved  · PET/CT with liver metastasis.  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  · Continue Compazine as well as Zofran.  They are currently alternating.  · Sancuso prescribed at last visit which has helped with the nausea and vomiting.   · She is no longer using the Sancuso due to insurance issues.  However nausea and vomiting have improved significantly     *GI prophylaxis  · Continue omeprazole daily  · No changes    *Deconditioning  · Steadily improving    *Hypokalemia  · Potassium normal  · Potassium now elevated at 4.9, hold potassium supplementation    *Brain metastasis  · Repeat MRI of the brain in March 28 2023  · Follow-up with radiation oncology after repeat MRI of the brain    *Abnormal LFTs  · ALT mildly elevated  · Monitor    RECOMMENDATIONS:  1. Taxotere today  2. MRI of the brain and PET/CT on March 28, 2023  3. Discontinue  4. Continue Compazine and Zofran for nausea  5. Follow-up on 3/29/2023 to review imaging    Patient is  on chemotherapy requiring close monitoring for toxicities.  41 minutes spent on the encounter.    Salma Snell MD

## 2023-03-15 ENCOUNTER — INFUSION (OUTPATIENT)
Dept: ONCOLOGY | Facility: HOSPITAL | Age: 58
End: 2023-03-15
Payer: COMMERCIAL

## 2023-03-15 ENCOUNTER — HOSPITAL ENCOUNTER (OUTPATIENT)
Dept: INFUSION THERAPY | Facility: HOSPITAL | Age: 58
Discharge: HOME | End: 2023-03-15
Payer: COMMERCIAL

## 2023-03-15 VITALS
HEART RATE: 91 BPM | TEMPERATURE: 97.3 F | OXYGEN SATURATION: 95 % | RESPIRATION RATE: 20 BRPM | SYSTOLIC BLOOD PRESSURE: 104 MMHG | DIASTOLIC BLOOD PRESSURE: 89 MMHG

## 2023-03-15 DIAGNOSIS — C79.51 NON-SMALL CELL LUNG CANCER METASTATIC TO BONE: Primary | ICD-10-CM

## 2023-03-15 DIAGNOSIS — C50.411 MALIGNANT NEOPLASM OF UPPER-OUTER QUADRANT OF RIGHT BREAST IN FEMALE, ESTROGEN RECEPTOR POSITIVE: ICD-10-CM

## 2023-03-15 DIAGNOSIS — C34.90 NON-SMALL CELL LUNG CANCER METASTATIC TO BONE: Primary | ICD-10-CM

## 2023-03-15 DIAGNOSIS — Z17.0 MALIGNANT NEOPLASM OF UPPER-OUTER QUADRANT OF RIGHT BREAST IN FEMALE, ESTROGEN RECEPTOR POSITIVE: ICD-10-CM

## 2023-03-15 DIAGNOSIS — Z45.2 ENCOUNTER FOR FITTING AND ADJUSTMENT OF VASCULAR CATHETER: ICD-10-CM

## 2023-03-15 PROCEDURE — 96372 THER/PROPH/DIAG INJ SC/IM: CPT

## 2023-03-15 PROCEDURE — 36430 TRANSFUSION BLD/BLD COMPNT: CPT

## 2023-03-15 PROCEDURE — 63710000001 DIPHENHYDRAMINE PER 50 MG: Performed by: INTERNAL MEDICINE

## 2023-03-15 PROCEDURE — 86900 BLOOD TYPING SEROLOGIC ABO: CPT

## 2023-03-15 PROCEDURE — 25010000002 PEGFILGRASTIM-CBQV 6 MG/0.6ML SOLUTION PREFILLED SYRINGE: Performed by: INTERNAL MEDICINE

## 2023-03-15 PROCEDURE — 25010000002 HEPARIN LOCK FLUSH PER 10 UNITS

## 2023-03-15 PROCEDURE — P9016 RBC LEUKOCYTES REDUCED: HCPCS

## 2023-03-15 RX ORDER — SODIUM CHLORIDE 9 MG/ML
250 INJECTION, SOLUTION INTRAVENOUS AS NEEDED
Status: DISCONTINUED | OUTPATIENT
Start: 2023-03-15 | End: 2023-03-17 | Stop reason: HOSPADM

## 2023-03-15 RX ORDER — DIPHENHYDRAMINE HCL 25 MG
25 CAPSULE ORAL ONCE
Status: COMPLETED | OUTPATIENT
Start: 2023-03-15 | End: 2023-03-15

## 2023-03-15 RX ORDER — HEPARIN SODIUM (PORCINE) LOCK FLUSH IV SOLN 100 UNIT/ML 100 UNIT/ML
SOLUTION INTRAVENOUS
Status: COMPLETED
Start: 2023-03-15 | End: 2023-03-15

## 2023-03-15 RX ORDER — HEPARIN SODIUM (PORCINE) LOCK FLUSH IV SOLN 100 UNIT/ML 100 UNIT/ML
500 SOLUTION INTRAVENOUS AS NEEDED
Status: DISCONTINUED | OUTPATIENT
Start: 2023-03-15 | End: 2023-03-17 | Stop reason: HOSPADM

## 2023-03-15 RX ORDER — HEPARIN SODIUM (PORCINE) LOCK FLUSH IV SOLN 100 UNIT/ML 100 UNIT/ML
500 SOLUTION INTRAVENOUS AS NEEDED
Status: CANCELLED | OUTPATIENT
Start: 2023-03-15

## 2023-03-15 RX ORDER — SODIUM CHLORIDE 0.9 % (FLUSH) 0.9 %
10 SYRINGE (ML) INJECTION AS NEEDED
Status: DISCONTINUED | OUTPATIENT
Start: 2023-03-15 | End: 2023-03-17 | Stop reason: HOSPADM

## 2023-03-15 RX ORDER — ACETAMINOPHEN 325 MG/1
650 TABLET ORAL ONCE
Status: COMPLETED | OUTPATIENT
Start: 2023-03-15 | End: 2023-03-15

## 2023-03-15 RX ORDER — DIPHENHYDRAMINE HCL 25 MG
CAPSULE ORAL
Status: ACTIVE
Start: 2023-03-15 | End: 2023-03-15

## 2023-03-15 RX ORDER — SODIUM CHLORIDE 0.9 % (FLUSH) 0.9 %
10 SYRINGE (ML) INJECTION AS NEEDED
Status: CANCELLED | OUTPATIENT
Start: 2023-03-15

## 2023-03-15 RX ORDER — ACETAMINOPHEN 325 MG/1
TABLET ORAL
Status: ACTIVE
Start: 2023-03-15 | End: 2023-03-15

## 2023-03-15 RX ADMIN — ACETAMINOPHEN 650 MG: 325 TABLET, FILM COATED ORAL at 09:28

## 2023-03-15 RX ADMIN — HEPARIN SODIUM (PORCINE) LOCK FLUSH IV SOLN 100 UNIT/ML 500 UNITS: 100 SOLUTION at 12:33

## 2023-03-15 RX ADMIN — PEGFILGRASTIM-CBQV 6 MG: 6 INJECTION, SOLUTION SUBCUTANEOUS at 13:21

## 2023-03-15 RX ADMIN — HEPARIN 500 UNITS: 100 SYRINGE at 12:33

## 2023-03-15 RX ADMIN — DIPHENHYDRAMINE HYDROCHLORIDE 25 MG: 25 CAPSULE ORAL at 09:28

## 2023-03-20 ENCOUNTER — TELEPHONE (OUTPATIENT)
Dept: ONCOLOGY | Facility: CLINIC | Age: 58
End: 2023-03-20
Payer: COMMERCIAL

## 2023-03-20 DIAGNOSIS — M79.89 PAIN AND SWELLING OF LEFT LOWER EXTREMITY: Primary | ICD-10-CM

## 2023-03-20 DIAGNOSIS — M79.605 PAIN AND SWELLING OF LEFT LOWER EXTREMITY: Primary | ICD-10-CM

## 2023-03-20 NOTE — TELEPHONE ENCOUNTER
----- Message from Brooklynn Powell RN sent at 3/20/2023  2:48 PM EDT -----  I've ordered a stat hold and call left lower extremity doppler.  If it cannot be done today, it can be done tomorrow.  She wants her to see an NP after for assessment of left lower extremity please.    Thank you!!

## 2023-03-20 NOTE — TELEPHONE ENCOUNTER
Jelani called to report that Holli has had pain and redness in her left foot and ankle.  This started mainly today.  It is slightly swollen.  I reviewed this with Dr Snell, orders received for left lower extremity doppler. She would also like for her to be evaluated by NP tomorrow after doppler completed.  Advised Jelani if they can't do it today, it will be tomorrow and will come to see NP after. Orders entered and scheduling notified.

## 2023-03-21 ENCOUNTER — HOSPITAL ENCOUNTER (OUTPATIENT)
Dept: CARDIOLOGY | Facility: HOSPITAL | Age: 58
Discharge: HOME | End: 2023-03-21
Admitting: INTERNAL MEDICINE
Payer: COMMERCIAL

## 2023-03-21 ENCOUNTER — OFFICE VISIT (OUTPATIENT)
Dept: ONCOLOGY | Facility: CLINIC | Age: 58
End: 2023-03-21
Payer: COMMERCIAL

## 2023-03-21 ENCOUNTER — INFUSION (OUTPATIENT)
Dept: ONCOLOGY | Facility: HOSPITAL | Age: 58
End: 2023-03-21
Payer: COMMERCIAL

## 2023-03-21 ENCOUNTER — APPOINTMENT (OUTPATIENT)
Dept: LAB | Facility: HOSPITAL | Age: 58
End: 2023-03-21
Payer: COMMERCIAL

## 2023-03-21 VITALS
TEMPERATURE: 96.9 F | HEIGHT: 69 IN | SYSTOLIC BLOOD PRESSURE: 114 MMHG | HEART RATE: 124 BPM | OXYGEN SATURATION: 98 % | WEIGHT: 135.8 LBS | BODY MASS INDEX: 20.11 KG/M2 | DIASTOLIC BLOOD PRESSURE: 82 MMHG

## 2023-03-21 DIAGNOSIS — I82.462 ACUTE DEEP VEIN THROMBOSIS (DVT) OF CALF MUSCLE VEIN OF LEFT LOWER EXTREMITY: Primary | ICD-10-CM

## 2023-03-21 DIAGNOSIS — Z74.09 IMPAIRED MOBILITY AND ACTIVITIES OF DAILY LIVING: ICD-10-CM

## 2023-03-21 DIAGNOSIS — M79.605 PAIN AND SWELLING OF LEFT LOWER EXTREMITY: ICD-10-CM

## 2023-03-21 DIAGNOSIS — Z78.9 IMPAIRED MOBILITY AND ACTIVITIES OF DAILY LIVING: ICD-10-CM

## 2023-03-21 DIAGNOSIS — I82.462 ACUTE DEEP VEIN THROMBOSIS (DVT) OF CALF MUSCLE VEIN OF LEFT LOWER EXTREMITY: ICD-10-CM

## 2023-03-21 DIAGNOSIS — Z45.2 ENCOUNTER FOR FITTING AND ADJUSTMENT OF VASCULAR CATHETER: Primary | ICD-10-CM

## 2023-03-21 DIAGNOSIS — C34.12 PRIMARY ADENOCARCINOMA OF UPPER LOBE OF LEFT LUNG: ICD-10-CM

## 2023-03-21 DIAGNOSIS — M79.89 PAIN AND SWELLING OF LEFT LOWER EXTREMITY: ICD-10-CM

## 2023-03-21 LAB
BASOPHILS # BLD AUTO: 0.02 10*3/MM3 (ref 0–0.2)
BASOPHILS NFR BLD AUTO: 0.1 % (ref 0–1.5)
BH CV LOW VAS LEFT SOLEAL VESSEL: 1
BH CV LOWER VASCULAR LEFT COMMON FEMORAL AUGMENT: NORMAL
BH CV LOWER VASCULAR LEFT COMMON FEMORAL COMPETENT: NORMAL
BH CV LOWER VASCULAR LEFT COMMON FEMORAL COMPRESS: NORMAL
BH CV LOWER VASCULAR LEFT COMMON FEMORAL PHASIC: NORMAL
BH CV LOWER VASCULAR LEFT COMMON FEMORAL SPONT: NORMAL
BH CV LOWER VASCULAR LEFT DISTAL FEMORAL COMPRESS: NORMAL
BH CV LOWER VASCULAR LEFT GASTRONEMIUS COMPRESS: NORMAL
BH CV LOWER VASCULAR LEFT GREATER SAPH AK COMPRESS: NORMAL
BH CV LOWER VASCULAR LEFT GREATER SAPH BK COMPRESS: NORMAL
BH CV LOWER VASCULAR LEFT LESSER SAPH COMPRESS: NORMAL
BH CV LOWER VASCULAR LEFT MID FEMORAL AUGMENT: NORMAL
BH CV LOWER VASCULAR LEFT MID FEMORAL COMPETENT: NORMAL
BH CV LOWER VASCULAR LEFT MID FEMORAL COMPRESS: NORMAL
BH CV LOWER VASCULAR LEFT MID FEMORAL PHASIC: NORMAL
BH CV LOWER VASCULAR LEFT MID FEMORAL SPONT: NORMAL
BH CV LOWER VASCULAR LEFT PERONEAL COMPRESS: NORMAL
BH CV LOWER VASCULAR LEFT POPLITEAL AUGMENT: NORMAL
BH CV LOWER VASCULAR LEFT POPLITEAL COMPETENT: NORMAL
BH CV LOWER VASCULAR LEFT POPLITEAL COMPRESS: NORMAL
BH CV LOWER VASCULAR LEFT POPLITEAL PHASIC: NORMAL
BH CV LOWER VASCULAR LEFT POPLITEAL SPONT: NORMAL
BH CV LOWER VASCULAR LEFT POSTERIOR TIBIAL COMPRESS: NORMAL
BH CV LOWER VASCULAR LEFT PROFUNDA FEMORAL COMPRESS: NORMAL
BH CV LOWER VASCULAR LEFT PROXIMAL FEMORAL COMPRESS: NORMAL
BH CV LOWER VASCULAR LEFT SAPHENOFEMORAL JUNCTION COMPRESS: NORMAL
BH CV LOWER VASCULAR LEFT SOLEAL COMPRESS: NORMAL
BH CV LOWER VASCULAR LEFT SOLEAL THROMBUS: NORMAL
BH CV LOWER VASCULAR RIGHT COMMON FEMORAL AUGMENT: NORMAL
BH CV LOWER VASCULAR RIGHT COMMON FEMORAL COMPETENT: NORMAL
BH CV LOWER VASCULAR RIGHT COMMON FEMORAL COMPRESS: NORMAL
BH CV LOWER VASCULAR RIGHT COMMON FEMORAL PHASIC: NORMAL
BH CV LOWER VASCULAR RIGHT COMMON FEMORAL SPONT: NORMAL
DEPRECATED RDW RBC AUTO: 59.7 FL (ref 37–54)
EOSINOPHIL # BLD AUTO: 0.04 10*3/MM3 (ref 0–0.4)
EOSINOPHIL NFR BLD AUTO: 0.3 % (ref 0.3–6.2)
ERYTHROCYTE [DISTWIDTH] IN BLOOD BY AUTOMATED COUNT: 16.7 % (ref 12.3–15.4)
HCT VFR BLD AUTO: 27.5 % (ref 34–46.6)
HGB BLD-MCNC: 8.4 G/DL (ref 12–15.9)
IMM GRANULOCYTES # BLD AUTO: 1.03 10*3/MM3 (ref 0–0.05)
IMM GRANULOCYTES NFR BLD AUTO: 7 % (ref 0–0.5)
LYMPHOCYTES # BLD AUTO: 0.37 10*3/MM3 (ref 0.7–3.1)
LYMPHOCYTES NFR BLD AUTO: 2.5 % (ref 19.6–45.3)
MAXIMAL PREDICTED HEART RATE: 161 BPM
MCH RBC QN AUTO: 30 PG (ref 26.6–33)
MCHC RBC AUTO-ENTMCNC: 30.5 G/DL (ref 31.5–35.7)
MCV RBC AUTO: 98.2 FL (ref 79–97)
MONOCYTES # BLD AUTO: 1.05 10*3/MM3 (ref 0.1–0.9)
MONOCYTES NFR BLD AUTO: 7.2 % (ref 5–12)
NEUTROPHILS NFR BLD AUTO: 12.15 10*3/MM3 (ref 1.7–7)
NEUTROPHILS NFR BLD AUTO: 82.9 % (ref 42.7–76)
NRBC BLD AUTO-RTO: 0.3 /100 WBC (ref 0–0.2)
PLATELET # BLD AUTO: 112 10*3/MM3 (ref 140–450)
PMV BLD AUTO: 10.4 FL (ref 6–12)
RBC # BLD AUTO: 2.8 10*6/MM3 (ref 3.77–5.28)
STRESS TARGET HR: 137 BPM
WBC NRBC COR # BLD: 14.66 10*3/MM3 (ref 3.4–10.8)

## 2023-03-21 PROCEDURE — 93971 EXTREMITY STUDY: CPT

## 2023-03-21 PROCEDURE — 36591 DRAW BLOOD OFF VENOUS DEVICE: CPT

## 2023-03-21 PROCEDURE — 99215 OFFICE O/P EST HI 40 MIN: CPT | Performed by: NURSE PRACTITIONER

## 2023-03-21 PROCEDURE — 85025 COMPLETE CBC W/AUTO DIFF WBC: CPT

## 2023-03-21 PROCEDURE — 25010000002 HEPARIN LOCK FLUSH PER 10 UNITS: Performed by: INTERNAL MEDICINE

## 2023-03-21 RX ORDER — HEPARIN SODIUM (PORCINE) LOCK FLUSH IV SOLN 100 UNIT/ML 100 UNIT/ML
500 SOLUTION INTRAVENOUS AS NEEDED
Status: DISCONTINUED | OUTPATIENT
Start: 2023-03-21 | End: 2023-03-21 | Stop reason: HOSPADM

## 2023-03-21 RX ORDER — HEPARIN SODIUM (PORCINE) LOCK FLUSH IV SOLN 100 UNIT/ML 100 UNIT/ML
500 SOLUTION INTRAVENOUS AS NEEDED
Status: CANCELLED | OUTPATIENT
Start: 2023-03-21

## 2023-03-21 RX ORDER — SODIUM CHLORIDE 0.9 % (FLUSH) 0.9 %
10 SYRINGE (ML) INJECTION AS NEEDED
Status: CANCELLED | OUTPATIENT
Start: 2023-03-21

## 2023-03-21 RX ORDER — SODIUM CHLORIDE 0.9 % (FLUSH) 0.9 %
10 SYRINGE (ML) INJECTION AS NEEDED
Status: DISCONTINUED | OUTPATIENT
Start: 2023-03-21 | End: 2023-03-21 | Stop reason: HOSPADM

## 2023-03-21 RX ADMIN — Medication 500 UNITS: at 12:03

## 2023-03-21 RX ADMIN — Medication 10 ML: at 12:03

## 2023-03-21 NOTE — PROGRESS NOTES
[facility] OUTPATIENT PROGRESS NOTE        CHIEF COMPLAINT/REASON FOR VISIT:  Metastatic non-small cell lung cancer  Bilateral breast cancer    Interval history  Patient called into the office yesterday reporting left lower extremity pain and redness.  She was sent for Doppler that was scheduled this morning.  Report called to our office showing acute soleal deep vein thrombosis in the left lower extremity.  Patient reports over the past few days she has been having increased left lower extremity pain.  She typically has a sensation of her legs being heavy bilaterally however this pain was more severe and yesterday she needed pain medication multiple times per day, reporting 10 doses over the course of the day.  She had a small amount of edema and increased redness in the left lower extremity.  Patient has not had any recent car or plane trips though is very sedentary and mainly is in her hospital bed in her living room and gets up to a bedside commode to go to the bathroom.  She is very afraid she is going to fall due to her previous fall while hospitalized.  She has been continuing on Eliquis 5 mg twice daily and both she and her  report she is consistent with dosing.    Oncologic history  Ms. 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     3/1/2021-bilateral screening mammogram-microcalcifications seen in the posterior one third of the lateral aspect of the right breast.  Area of focal asymmetry seen in the middle one third of the upper inner quadrant of the left breast.     3/1/2021-DEXA scan-T score of -2.2 on the lumbar spine and T score of -2.3 in the left hip and T score of -1.9 in the right hip.  Findings consistent with osteopenia     3/23/2021-screening lung CT-there is a 10 x 11 mm solid nodule in the left upper lobe.  Enlarged AP window lymph node measuring 14 x 10 mm.  Suggestion of a possible 9  mm left hilar lymph node.  Heavy coronary artery calcification.     3/23/2021-diagnostic mammogram bilateral-cluster of microcalcifications in the middle third lateral aspect of the right breast.  Left breast demonstrates persistence of the area of focal asymmetry in the region.     Ultrasound-left breast ultrasound at 10 o'clock position, 6 cm from the nipple there is a 0.4 cm irregular hypoechoic lesion.  Stereotactic biopsy of the right breast calcifications recommended.  Ultrasound-guided biopsy of the left breast lesion recommended     4/7/2021-right breast stereotactic biopsy and left breast ultrasound-guided biopsy  Pathology  Right breast-invasive ductal carcinoma, grade 2, lymphovascular invasion present, ER +99% strong, MI +80% moderate, HER-2 negative, Ki-67 12%  Left breast upper inner quadrant 10 o'clock position biopsy, invasive ductal carcinoma, grade   2, ER +99% strong, MI +40% strong, HER-2 2+ on immunohistochemistry, nonamplified on FISH Ki-67 10%     4/14/2021-PET/CT-FDG avid aortopulmonary window lymph node measures 0.9 cm with an SUV of 7.5.  FDG avid left hilar lymph node measures 1 cm with an SUV of 17.2.  Irregular soft tissue density in the right breast measuring 3.7 x 3.8 cm is favored to be secondary to the recent breast biopsy.  1.1 cm pulmonary nodule within the left upper lobe with SUV 9.7 .  Sub-6 mm pulmonary nodules are present in the right lower lobe.  No evidence of lymphadenopathy or metastatic disease in the abdomen.  Focal area of FDG uptake within the central canal posterior to T11-T12 displaced demonstrating an SUV of 5.5 thought to be reactive however MRI is recommended for further evaluation.     She was seen by Dr. Molina who initially planned for breast surgery however  due to the PET abnormalities she has been referred to Dr. Kerr who plans to do a wound on 4/30/2021.  Dr. Molina's and Dr. Kerr's notes reviewed.     Patient denies any family history of breast  cancer.  Her mother had lymphoma.  Maternal grandmother had colon cancer.  Prior to that screening mammogram she did not have any palpable abnormalities of either breast.     She has been a heavy smoker for about 40 years and smoked 2 packs/day.  Denies any recent weight changes, new bone pains, cough, abdominal pain nausea vomiting constipation or diarrhea.  She does have anxiety and has been on several medications.  She has recently been started on Xanax to help with the mood and also with insomnia.     Patient had a video-assisted thoracoscopy and mediastinal lymphadenectomy on 4/30/2021.  L5-L6 lymph nodes were biopsied however the small pulmonary nodule in the left upper lobe was not difficult to find.  Pathology showed moderately differentiated adenocarcinoma which is CK7 and TTF-1 positive.  Consistent with lung primary.  Ki-67 85%.     5/14/2021-MRI of the brain-minimal chronic small vessel ischemic change in the white matter.  Otherwise negative MRI.     5/20/2021-bilateral axillary ultrasound-no evidence of axillary lymphadenopathy in either axilla.  Normal-appearing bilateral axillary lymph nodes visualized.     Cycle 1 cisplatin and Alimta on 5/25/2021.     Cycle 2 cisplatin Alimta 6/15/2021     Cycle 3 cisplatin Alimta 7/7/2021     Completed radiation on 7/12/2021     Port was nonfunctional hence a port study was performed which showed that the port was backed up into the innominate vein and also there was a nonocclusive thrombus at the end of the catheter extending into the superior vena cava.  She was started on anticoagulation with Eliquis.  It was recommended for port revision of the intention to keep the port.     PET/CT 8/5/2021-images independently reviewed and interpreted by me-decrease in size and FDG uptake of the left upper lobe pulmonary nodule as well as mediastinal and left hilar lymphadenopathy representing response to treatment.  Sub-6 mm pulmonary nodule in the left upper lobe new since  4/14/2021.  This could be related to radiation however follow-up CT in 3 months recommended.  Decrease in size of the irregular masslike tissue in the right breast which is postbiopsy hematoma.  This is not PET avid.  New segmental left eighth rib fractures healing.  A new band of sclerosis of the left seventh rib which favored to represent healing nondisplaced fracture.  Follow-up CT recommended.  focal uptake in the duodenum first and second portions.  Could be related to duodenitis.  Indeterminate lesion in the L1 vertebral body not well evaluated on PET/CT.     10/1/2021-MRI of the lumbar spine.  Lesion in the left posterior body of L1 measures 14 x 14 x 12 mm and previously 5 x 5 x 6 mm.  The interval enlargement strongly suggest that it is metastatic lesion.  No additional osseous metastasis noted.   Other chronic changes noted.     10/14/2021-biopsy of the lumbar spine lesion-pathology consistent with poorly differentiated carcinoma.  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     10/26/2021-brain MRI-no evidence of metastatic disease.     10/26/2021-bilateral diagnostic mammogram and ultrasound  Scattered fibroglandular density in both breast.  Postbiopsy hematoma with internal biopsy clip in the upper outer quadrant of the right breast, 8 cm from the nipple.  The hematoma size is decreased to 2.9 cm from 3.6 cm earlier.     Left breast-parenchymal density measuring 9 x 10 mm has markedly decreased.  Few adjacent calcifications which are unchanged.  No new abnormality in the left breast.  At 10:00 region there is some minimal adjacent hypoechoic texture which is difficult to measure but appears smaller since the previous exam.     10/28/2021-PET/CT  New L1 and L2 lytic bone metastasis annually intensely hypermetabolic focus at the left adrenal gland likely represents metastatic disease as well.  Slight decrease in size of the  0.9 x 0.7 cm left upper lobe pulmonary nodule.  There is hypermetabolic activity related to radiation pneumonitis.  The remainder of the nodule is photopenic.  Uncertain etiology of the more intense activity along the right lateral peripheral margin of the 2.6 cm postbiopsy density of the right breast.     She completed radiation to to the lumbar spine on 11/19/2021 11/22/2021-cycle 1 Alimta and Keytruda     3/23/2022-bilateral diagnostic mammogram and ultrasound  Complete resolution of the microcalcifications in the upper outer quadrant of the right breast and decrease in size of the postbiopsy hematoma.  No suspicious abnormalities in the right breast.  Benign-appearing calcifications at the site of malignancy in the left breast upper inner quadrant.  No other suspicious findings.     4/11/2022 PET/CT-there is new groundglass opacities in the left upper lobe which are most likely postradiation changes.  Other groundglass opacities in the left lung as well as the right lung remained stable.  Increased nodularity of the left adrenal gland with an SUV of 5.6, previously 3.5.    Increased uptake in the L1 vertebra in the right posterior aspect with an SUV of 3.6.  Left L1 sclerosis increased     Due to this the case was discussed in multidisciplinary conference.  The disease progression was significant in the left adrenal gland as well as the L1 vertebra.  Since there were only 2 areas of disease progression it has been decided to proceed with radiation to these 2 spots and continue on Keytruda and Alimta.     Patient also has had worsening of her kidney function.  We initially held treatment as of 5/31/2022 and creatinine bumped up to 2.0, BUN 23.  We then resumed therapy 6/11/2022 with Keytruda alone.  Patient was referred to nephrology.     6/6/2022-MRI of the spine-diffuse marrow replacement in the L1 vertebral body and inferior endplate concavity.  Suspicious for metastatic disease with pathological fracture  in the inferior endplate.  Also diffuse signal abnormality in the L2 lamina on the left suspicious for additional metastatic disease.  Abnormality of the first sacral segment suspicious for metastatic disease.  Left adrenal gland appears enlarged.  30 to 40% height loss and L2 vertebral body suggestive of a subacute compression fracture.  Degenerative changes.     7/18/2022-PET/CT  Multiple hypermetabolic osseous lesions with index lesions which have either increased in degree of FDG uptake or newly hypermetabolic lesions representative of metastatic disease and progression of disease.  Possible FDG avid lesion in the left femoral diaphysis however these are incompletely visualized and in the area of artifactual FDG uptake.  MRI of the left femur recommended for further evaluation.  Increasing FDG uptake of the left adrenal gland.  Focal left hilar hypermetabolic him corresponds to the lymph node which has minimally increased in size compared to April 2022.  New sub-6 mm pulmonary nodules.     7/26/2022-cycle 1 of Taxotere     Patient was admitted to the hospital 8/3/2022 discharged on 8/9/2022.  She was admitted for strokelike symptoms and confusion.  The confusion was thought to be secondary to polypharmacy and probably febrile neutropenia.  She was treated with antibiotics.  Mental status improved subsequently.  MRI of the cervical thoracic and lumbar spine was performed on 8/4/2022.  Images independently reviewed by me.  Multiple metastatic lesions in the spine noted.  There was a lesion on the CT which was concerning.  There is also a sacral Lesion measuring up to 5 cm in transverse dimension on the right.  Patient was symptomatic with pain on the right side of the sacrum.  Radiation oncology recommended radiation to the cervical spine as well as sacrum.  CT head without any obvious abnormalities.     Completed radiation to the cervical spine and sacrum on 8/12/2022    She was admitted to the hospital between  9/23/2022 to 10/12/2022.  She was admitted for poorly controlled pain as well as encephalopathy and multiple falls.  She was diagnosed with a UTI and treated with IV Rocephin.  Pain medications changed and also additional radiation to the right sacrum was performed.  3000 cGy in 10 fractions was administered.  She was discharged on oxycodone 15 mg every 4 hours as needed for pain and OxyContin twice daily.  She was recommended to go to a subacute rehab however she preferred to be discharged home with home physical therapy and Occupational Therapy    9/24/2022-MRI of the pelvis showed osseous metastatic disease in the sacrum worst within the right and probable pathological fractures bilaterally.    10/4/2022 CT head without any evidence of metastatic disease or acute abnormalities.    10/25/2022-PET/CT-resolution of hypermetabolic activity in the upper lobe and left hilum.  Previously noted new tiny pulmonary nodules have resolved.  Resolution of hypermetabolic activity in the left adrenal gland.  Near complete resolution of hypermetabolic activity in the skeletal metastasis particularly the cervical spine, pelvic bones and proximal left femur.  Maximal SUV at the right sacral metastasis is 2.7 and previously it was 8.9.  Tiny focus of hypermetabolic activity at the GE junction with a maximal SUV of 4.7.  No definite thickening or abnormality noted on the CT.    11/15/2022-lumbar puncture with negative cytology of the CSF.    11/21/2022-MRI of the brain with contrast and MRI of the cervical spine with contrast without any evidence of metastatic disease.    1/12/2023-PET/CT  Hypermetabolic mediastinal lymphadenopathy and multiple liver lesions which are hypermetabolic suggestive of metastasis.  New tiny left pleural effusion is noted.  Progression of bilateral sacral fractures and right L5 transverse process fracture.  There is also an acute/subacute L1 fracture.  Lumbar spine x-rays have been recommended for  "evaluation of the vertebral height or lumbar MRI to be compared to the priors.    1/23/2023-MRI of the brain  At least 9 separate tiny 2 to 6 mm enhancing lesions in the brain compatible with multiple new brain metastasis.  There has been interval development of 3 separate foci of encephalomalacia concerning for chronic infarcts but these are new since August 2022.  Stable osseous metastasis at C2.    Vitals:    03/21/23 1113   BP: 114/82   Pulse: (!) 124   Temp: 96.9 °F (36.1 °C)   TempSrc: Temporal   SpO2: 98%   Weight: 61.6 kg (135 lb 12.8 oz)   Height: 175.3 cm (69.02\")   PainSc: 10-Worst pain ever  Comment: when its not so bad, its 5   PainLoc: Foot         PHYSICAL EXAMINATION:    General: Oriented to person, place, and time.  Ill-appearing.  Pale.  HEENT: EOMI.  No bruising/edema noted.  Chest/Lungs: Clear to auscultation bilaterally. Right chest mediport in place  Heart: RRR, sinus tachycardia  Extremities: Bilateral lower extremity without any swelling.  Decreased strength in both lower extremities.  Left lower extremity with scant redness to upper foot  No focal deficits.    I have reexamined the patient and the results are consistent with the previously documented exam. KOIR Gurrola     DIAGNOSTIC DATA:  Results Review:        Results from last 7 days   Lab Units 03/21/23  1227   WBC 10*3/mm3 14.66*   HEMOGLOBIN g/dL 8.4*   HEMATOCRIT % 27.5*   PLATELETS 10*3/mm3 112*     Lab Results   Component Value Date    NEUTROABS 12.15 (H) 03/21/2023                     IMAGING:    Duplex Venous Lower Extremity - Left CAR (03/21/2023 10:25)    PET/CT 1/12/2023 and MRI of the brain 1/23/2023-images independently reviewed and interpreted by me.    ASSESSMENT:  This is a 57 y.o. female with:     *Metastatic non-small cell lung cancer  · She was initially diagnosed with stage III (T1N2M0) disease.    · She received initial therapy with cisplatin and Alimta concurrent with radiation therapy beginning 5/25/2021. "    · There were indeterminate findings in the lumbar spine at L1 and it was recommended to monitor this over time.    · Patient completed 3 cycles cisplatin and Alimta 7/7/2021 and completed radiation therapy 7/12/2021.    · PET scan 8/6/2021 showed good response to treatment.    · MRI lumbar spine 10/1/2021 with increase in size of the L1 lesion.    · Biopsy of the L1 lesion on 10/14/2022 confirmed metastatic adenocarcinoma of lung origin, PD-L1 TPS 0.    · PET scan 10/26/2021 with involvement of left adrenal and L1/L2 only.    · Patient received radiation therapy to L1/L2 on 11/19/2021.  She also received radiation to the left adrenal gland.    · She initiated further systemic therapy on 11/22/2021 with Keytruda and Alimta.   · Follow-up PET scan 1/18/2022 with decrease in small left upper lobe pulmonary nodule, resolution of activity at L1/L2, no new sites of disease.    · Guardant 360 CT DNA level was monitored and was decreasing.    · PET scan 4/11/2022 with progression in left adrenal and L1.    · Maintained systemic therapy with Keytruda and Alimta and received additional radiation to left adrenal and L1.  Alimta however held since 5/10/2022 due to renal dysfunction.  Radiation completed to lumbar spine 7/14/2022.    · PET scan 7/18/2022 with multiple areas of progression of the spine and right sacrum.    · Guardant 360 analysis 7/20/2022 with no actionable mutations.    · Change in therapy to single agent palliative Taxotere initiated 7/26/2022.    · Altered mental status and febrile neutropenia requiring hospitalization 8/2 - 8/9/2022.  During that admission, MRI brain, cervical, thoracic, lumbar spine performed 8/4/2022 in addition to CT cervical spine 8/5/2022.  There was evidence of C2 metastatic lesion with some dural enhancement, compression deformity at T7 with mild edema suggesting acute to subacute fracture, 5 mm T12 spinous process metastasis, multifocal disease in the lumbar spine and sacrum,  largest involving sacral ala to the right 5 cm in transverse dimension.  Loss of height at L2 25%.  · Patient received cycle 3 Taxotere 9/6/2022 with Neulasta support.    · She has been continuing as well on Xgeva every 4 weeks (last received 9/6/2022).  · Patient did undergo MRI lumbar spine and pelvis on 9/24/2022.  MRI pelvis showed bilateral sacral fractures with marrow infiltration related to metastasis more prominent on the right.  Lesion on the right 3.5 x 4.1 x 4.3 cm.  Also probable metastasis along posterior acetabulum on the right 1.5 cm.  MRI lumbar spine with stable L1 metastasis, new edema endplate T12 possibly stress reaction versus developing new metastasis, lesion at T12 spinous process unchanged.   · Radiation oncology consulted with plans for palliative treatment of the right sacral metastasis.  Treatment initiated 9/28/2022  · Completed radiation to the right sacral metastasis on 10/11/2022.  · PET/CT 10/25/2022 without any metabolic uptake in the lung or the skeletal metastasis.  This is indicative of a positive response to Taxotere.  · Pain persistent sitting and walking position and not present when she is laying down.  · Patient is still very active and performance status is very poor.  Due to this reason chemotherapy will be continued to be held.  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  · Due to this reason an MRI of the brain was obtained but unfortunately this was performed without contrast.  Reviewed the MRI and no evidence of metastatic disease.  · 11/15/2022 lumbar puncture shows no evidence of malignancy in the CSF, elevated protein at 65.2, glucose normal at 58, culture no growth in 3 days  · Repeat MRI of the brain with contrast 11/21/2022 does not show any obvious abnormalities.  There is no enhancement of the CSF.  MRI of the cervical spine with no abnormalities.  · Patient unable to undergo MRI of the  thoracic and lumbar spine due to mental status and unable to being in 1 position as well as with incontinence.  · 1/4/2023-mental status has improved significantly.  Patient reports decreased oral intake and nausea and vomiting.  She is also reporting abdominal discomfort.  · 1/12/2023-PET/CT with mediastinal lymphadenopathy as well as multiple liver lesions suggestive of disease progression.  · 1/23/2023-MRI of the brain with at least 9 different foci of small mets measuring up to 6 mm.  There is also areas of encephalomalacia concerning for chronic infarcts.    · Resume Taxotere 1/31/2023.  Dose reduced to 60 mg per metered square.  · 2/21/2023-scheduled for cycle 2 of Taxotere and she has tolerated cycle 1 extremely well.  Proceed with chemotherapy today.  Labs reviewed and stable to proceed  · 3/14/2023-cycle 3 Taxotere, hemoglobin low at 7.6, we will arrange for 1 unit of PRBC. CMP reviewed and creatinine 1.14, potassium 4.9, ALT 47, AST normal at 24 and alkaline phosphatase 131     *History of bilateral stage I breast cancer  · Patient with bilateral breast cancer, both stage I ([id number]), grade 2, ER/WA positive and HER2/melinda negative with low Ki-67.    · Patient initiated adjnuvant letrozole in May 2021.  · Letrozole discontinued as she is on Taxotere    *Mental status changes  · Patient's mental status is back at baseline.  · Significant improvement  · Currently off all psychotropic medications.  · Mental status at baseline.  · MRI scheduled for 3/28/2023     *Cancer related pain  · Patient has been receiving Duragesic patch 50 mcg every 72 hours in addition to oxycodone 15 mg every 4 hours for breakthrough pain.  Duragesic dose had been escalated recently in the outpatient setting due to worsening pain  · On admission 9/23/2022, Duragesic patch increased to 75 mcg daily, added Decadron 4 mg IV every 6 hours with continuation of oxycodone 15 mg every 4 hours as needed for breakthrough pain in addition of  Dilaudid 1 mg every 2 hours as needed for breakthrough pain.  · Patient with increasing side effects from narcotics, Duragesic decreased on 9/25/2022 from 75 down to 50 mcg patch every 72 hours.  · Dexamethasone dose decreased to 4 mg p.o. every 12 hours on 9/27/2022  · Initiated palliative radiation to the right sacrum on 9/28/2022  · Patient became confused after receiving Dilaudid.  Patient and family asked to discontinue Dilaudid.  · Duragesic patch dose was increased to 75 mcg/h on 9/30/2022. Subsequent decrease to 50 mcg due to confusion  · Gabapentin was discontinued due to the sedating effect.  · Oxycodone is being used for breakthrough pain.  · 10/3 transition to sustained release oxycodone 40 mg bid and fentanyl patch stopped  · Poorly controlled, pain particularly worse in sitting and standing position.  · Unsure if extends is contributing to mental status changes as previously patient had been very sensitive to pain meds.  · Due to this reason we will discontinue the Xtampza ER and switch to OxyContin 20 mg twice daily.  · She continues to experience severe mental status changes.  · Long-acting pain medications discontinued due to mental status changes and she is currently on oxycodone 15 mg as needed.  · Reports better pain control after the epidural injection of the spine.  · Pain today is in her left lower extremity, slightly better than yesterday though still more painful than previous.  She does have oxycodone to use as needed at home.      *Anxiety/depression  · Currently all medications have been discontinued due to mental status changes  · Continue Xanax as needed  · Anxiety well-controlled at this time     *Insomnia  · She has been receiving Xanax as needed       *Anemia  · Hemoglobin 7.6 3/14/2023.  B12, ferritin, iron studies, and folate unremarkable.  · 1 unit of PRBC given 3/16/2023  · 3/21/2023 hemoglobin 8.4     *Peripheral neuropathy  · Patient was on gabapentin 300 mg twice  daily.  · Gabapentin was discontinued on 9/30/2022.  · Neuropathy stable  · Patient noting heaviness to both lower extremities when seen on 3/21/2023, possible worsening neuropathy secondary to Taxotere?  Reevaluate further at next visit once anticoagulant changed and possibly acute DVT pain eliminated.     *Nausea vomiting and abdominal discomfort  · Continues to experience vomiting.  · Abdominal discomfort has resolved  · PET/CT with liver metastasis.  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  · Continue Compazine as well as Zofran.  They are currently alternating.  · Sancuso prescribed at last visit which has helped with the nausea and vomiting.   · She is no longer using the [employer] due to insurance issues.  However nausea and vomiting have improved significantly    *Mouth sores  · 3/21/2023 patient reporting mouth sores or not being helped with her current Magic mouth rinse.  Discussed with Brooklynn Powell RN and current prescription does not have steroids added, requested dexamethasone to be added to new prescription.    *GI prophylaxis  · Continue omeprazole daily  · No changes    *Deconditioning  · Steadily improving  · 3/21/2023 patient reports she has been quite sedentary, mainly sitting in her hospital bed and getting up to bedside commode instead of going to the bathroom at home.    *Hypokalemia  · Potassium normal  · Potassium most recently elevated at 4.9, hold potassium supplementation    *Brain metastasis  · Repeat MRI of the brain in March 28 2023  · Follow-up with radiation oncology after repeat MRI of the brain    *Abnormal LFTs  · ALT mildly elevated  · Monitor    *Acute left soleal deep vein thrombosis 3/21/2023  · Patient seen 3/21/2023 after having a left lower extremity Doppler due to reports of severe left lower extremity pain with redness and some swelling noted.  Preliminary results positive for acute soleal DVT.  Patient continues on Eliquis 5 mg twice daily and denies  missing doses.  Case discussed with Dr. Snell and we will obtain additional lab work including beta-2 glycoprotein, lupus anticoagulant, and anticardiolipin antibody.  We will proceed with switching her Eliquis to Xarelto 15 mg twice daily x21 days then 20 mg daily thereafter.  She was given samples for the loading dose and will need a prescription at a later date for the 20 mg once daily.    RECOMMENDATIONS:  1. Discontinue Eliquis  2. Begin Xarelto 15 mg twice daily x21 days, samples given to cover this  3. Patient will need a prescription for Xarelto 20 mg daily to be sent to her pharmacy to begin once her twice daily dosing is complete.  We will send this prescription at a later date to ensure she is tolerating the change initially.  4. New prescription to pharmacy for [employer]'s Magic mouth rinse with dexamethasone.  5. Patient will monitor for any signs or symptoms of bleeding.  6. Patient will treat pain with oxycodone 15 mg every 4 hours as needed  7. Additional labs from today pending including anticardiolipin antibody, lupus anticoagulant, beta-2 glycoprotein  8. Follow-up on 3/29/2023 with Dr. Snell to review imaging    Case discussed with Dr. Snell, case discussed with clinic nurse and medical assistant.I spent 60 minutes caring for Holli on this date of service. This time includes time spent by me in the following activities: preparing for the visit, reviewing tests, obtaining and/or reviewing a separately obtained history, performing a medically appropriate examination and/or evaluation, counseling and educating the patient/family/caregiver, ordering medications, tests, or procedures, documenting information in the medical record, independently interpreting results and communicating that information with the patient/family/caregiver and care coordination.       KORI Gurrola

## 2023-03-21 NOTE — PROGRESS NOTES
Today's preliminary for left lower venous doppler is positive for acute calf DVT. A preliminary was called to Dr. Snell. She said to let the patient know she will be calling them shortly.

## 2023-03-22 LAB
CARDIOLIPIN IGG SER IA-ACNC: <9 GPL U/ML (ref 0–14)
CARDIOLIPIN IGM SER IA-ACNC: <9 MPL U/ML (ref 0–12)

## 2023-03-23 LAB
APTT HEX PL PPP: 4 SEC (ref 0–11)
APTT SCREEN TO CONFIRM RATIO: 0.97 RATIO (ref 0–1.34)
CONFIRM APTT/NORMAL: 45 SEC (ref 0–47.6)
DRVVT SCREEN TO CONFIRM RATIO: 0.8 RATIO (ref 0.8–1.2)
LA 2 SCREEN W REFLEX-IMP: ABNORMAL
MIXING APTT: 44.2 SEC (ref 0–40.5)
MIXING DRVVT: 61.7 SEC (ref 0–40.4)
SCREEN APTT: 51.8 SEC (ref 0–43.5)
SCREEN DRVVT: 79 SEC (ref 0–47)
THROMBIN TIME: 19 SEC (ref 0–23)

## 2023-03-24 LAB
B2 GLYCOPROT1 IGA SER-ACNC: <9 GPI IGA UNITS (ref 0–25)
B2 GLYCOPROT1 IGG SER-ACNC: <9 GPI IGG UNITS (ref 0–20)
B2 GLYCOPROT1 IGM SER-ACNC: <9 GPI IGM UNITS (ref 0–32)

## 2023-03-27 ENCOUNTER — HOSPITAL ENCOUNTER (OUTPATIENT)
Dept: PET IMAGING | Facility: HOSPITAL | Age: 58
Discharge: HOME | End: 2023-03-27
Payer: COMMERCIAL

## 2023-03-27 DIAGNOSIS — C50.411 MALIGNANT NEOPLASM OF UPPER-OUTER QUADRANT OF RIGHT BREAST IN FEMALE, ESTROGEN RECEPTOR POSITIVE: ICD-10-CM

## 2023-03-27 DIAGNOSIS — C34.90 NON-SMALL CELL LUNG CANCER METASTATIC TO BONE: ICD-10-CM

## 2023-03-27 DIAGNOSIS — Z17.0 MALIGNANT NEOPLASM OF UPPER-OUTER QUADRANT OF RIGHT BREAST IN FEMALE, ESTROGEN RECEPTOR POSITIVE: ICD-10-CM

## 2023-03-27 DIAGNOSIS — C79.51 NON-SMALL CELL LUNG CANCER METASTATIC TO BONE: ICD-10-CM

## 2023-03-27 LAB — GLUCOSE BLDC GLUCOMTR-MCNC: 86 MG/DL (ref 70–130)

## 2023-03-27 PROCEDURE — A9552 F18 FDG: HCPCS | Performed by: INTERNAL MEDICINE

## 2023-03-27 PROCEDURE — 78815 PET IMAGE W/CT SKULL-THIGH: CPT

## 2023-03-27 PROCEDURE — 82962 GLUCOSE BLOOD TEST: CPT

## 2023-03-27 PROCEDURE — 0 FLUDEOXYGLUCOSE F18 SOLUTION: Performed by: INTERNAL MEDICINE

## 2023-03-27 RX ADMIN — FLUDEOXYGLUCOSE F18 1 DOSE: 300 INJECTION INTRAVENOUS at 07:56

## 2023-03-28 ENCOUNTER — HOSPITAL ENCOUNTER (OUTPATIENT)
Dept: MRI IMAGING | Facility: HOSPITAL | Age: 58
Discharge: HOME | End: 2023-03-28
Admitting: INTERNAL MEDICINE
Payer: COMMERCIAL

## 2023-03-28 DIAGNOSIS — R29.818 NEUROLOGICAL DEFICIT PRESENT: ICD-10-CM

## 2023-03-28 DIAGNOSIS — E87.6 HYPOKALEMIA: ICD-10-CM

## 2023-03-28 DIAGNOSIS — C34.90 NON-SMALL CELL LUNG CANCER METASTATIC TO BONE: Primary | ICD-10-CM

## 2023-03-28 DIAGNOSIS — Z45.2 ENCOUNTER FOR FITTING AND ADJUSTMENT OF VASCULAR CATHETER: ICD-10-CM

## 2023-03-28 DIAGNOSIS — G89.3 CANCER RELATED PAIN: ICD-10-CM

## 2023-03-28 DIAGNOSIS — C50.212 MALIGNANT NEOPLASM OF UPPER-INNER QUADRANT OF LEFT BREAST IN FEMALE, ESTROGEN RECEPTOR POSITIVE: ICD-10-CM

## 2023-03-28 DIAGNOSIS — Z17.0 MALIGNANT NEOPLASM OF UPPER-INNER QUADRANT OF LEFT BREAST IN FEMALE, ESTROGEN RECEPTOR POSITIVE: ICD-10-CM

## 2023-03-28 DIAGNOSIS — C79.51 NON-SMALL CELL LUNG CANCER METASTATIC TO BONE: Primary | ICD-10-CM

## 2023-03-28 PROCEDURE — 25010000002 HEPARIN LOCK FLUSH PER 10 UNITS: Performed by: INTERNAL MEDICINE

## 2023-03-28 PROCEDURE — 70553 MRI BRAIN STEM W/O & W/DYE: CPT

## 2023-03-28 PROCEDURE — A9577 INJ MULTIHANCE: HCPCS | Performed by: INTERNAL MEDICINE

## 2023-03-28 PROCEDURE — 0 GADOBENATE DIMEGLUMINE 529 MG/ML SOLUTION: Performed by: INTERNAL MEDICINE

## 2023-03-28 RX ORDER — SODIUM CHLORIDE 0.9 % (FLUSH) 0.9 %
10 SYRINGE (ML) INJECTION AS NEEDED
Status: DISCONTINUED | OUTPATIENT
Start: 2023-03-28 | End: 2023-03-30 | Stop reason: HOSPADM

## 2023-03-28 RX ORDER — HEPARIN SODIUM (PORCINE) LOCK FLUSH IV SOLN 100 UNIT/ML 100 UNIT/ML
500 SOLUTION INTRAVENOUS AS NEEDED
Status: CANCELLED | OUTPATIENT
Start: 2023-03-28

## 2023-03-28 RX ORDER — HEPARIN SODIUM (PORCINE) LOCK FLUSH IV SOLN 100 UNIT/ML 100 UNIT/ML
500 SOLUTION INTRAVENOUS AS NEEDED
Status: DISCONTINUED | OUTPATIENT
Start: 2023-03-28 | End: 2023-03-30 | Stop reason: HOSPADM

## 2023-03-28 RX ORDER — SODIUM CHLORIDE 0.9 % (FLUSH) 0.9 %
10 SYRINGE (ML) INJECTION AS NEEDED
Status: CANCELLED | OUTPATIENT
Start: 2023-03-28

## 2023-03-28 RX ADMIN — Medication 500 UNITS: at 13:14

## 2023-03-28 RX ADMIN — GADOBENATE DIMEGLUMINE 12 ML: 529 INJECTION, SOLUTION INTRAVENOUS at 12:44

## 2023-03-28 RX ADMIN — Medication 10 ML: at 13:13

## 2023-03-29 ENCOUNTER — OFFICE VISIT (OUTPATIENT)
Dept: ONCOLOGY | Facility: CLINIC | Age: 58
End: 2023-03-29
Payer: COMMERCIAL

## 2023-03-29 VITALS
TEMPERATURE: 99.1 F | HEIGHT: 69 IN | DIASTOLIC BLOOD PRESSURE: 77 MMHG | BODY MASS INDEX: 20.65 KG/M2 | HEART RATE: 122 BPM | SYSTOLIC BLOOD PRESSURE: 116 MMHG | RESPIRATION RATE: 16 BRPM | OXYGEN SATURATION: 96 % | WEIGHT: 139.4 LBS

## 2023-03-29 DIAGNOSIS — C79.51 NON-SMALL CELL LUNG CANCER METASTATIC TO BONE: Primary | ICD-10-CM

## 2023-03-29 DIAGNOSIS — C34.90 NON-SMALL CELL LUNG CANCER METASTATIC TO BONE: Primary | ICD-10-CM

## 2023-03-29 PROCEDURE — 99215 OFFICE O/P EST HI 40 MIN: CPT | Performed by: INTERNAL MEDICINE

## 2023-03-29 RX ORDER — LEVOFLOXACIN 500 MG/1
500 TABLET, FILM COATED ORAL DAILY
Qty: 7 TABLET | Refills: 0 | Status: SHIPPED | OUTPATIENT
Start: 2023-03-29

## 2023-03-29 NOTE — PROGRESS NOTES
[facility] GROUP OUTPATIENT PROGRESS NOTE        CHIEF COMPLAINT/REASON FOR VISIT:  Metastatic non-small cell lung cancer  Bilateral breast cancer    Interval history  Patient presents to the clinic today for follow-up.  She had restaging PET/CT as well as MRI of the brain.  She reports a good appetite and her weight has been improving.  She is tachycardic.  Denies any fevers or chills.  Denies any abdominal pain nausea or vomiting.  Neuropathy remains stable.  She has been compliant with Xarelto.    Oncologic history  Ms. 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     3/1/2021-bilateral screening mammogram-microcalcifications seen in the posterior one third of the lateral aspect of the right breast.  Area of focal asymmetry seen in the middle one third of the upper inner quadrant of the left breast.     3/1/2021-DEXA scan-T score of -2.2 on the lumbar spine and T score of -2.3 in the left hip and T score of -1.9 in the right hip.  Findings consistent with osteopenia     3/23/2021-screening lung CT-there is a 10 x 11 mm solid nodule in the left upper lobe.  Enlarged AP window lymph node measuring 14 x 10 mm.  Suggestion of a possible 9 mm left hilar lymph node.  Heavy coronary artery calcification.     3/23/2021-diagnostic mammogram bilateral-cluster of microcalcifications in the middle third lateral aspect of the right breast.  Left breast demonstrates persistence of the area of focal asymmetry in the region.     Ultrasound-left breast ultrasound at 10 o'clock position, 6 cm from the nipple there is a 0.4 cm irregular hypoechoic lesion.  Stereotactic biopsy of the right breast calcifications recommended.  Ultrasound-guided biopsy of the left breast lesion recommended     4/7/2021-right breast stereotactic biopsy and left breast ultrasound-guided biopsy  Pathology  Right breast-invasive ductal carcinoma, grade 2,  lymphovascular invasion present, ER +99% strong, AL +80% moderate, HER-2 negative, Ki-67 12%  Left breast upper inner quadrant 10 o'clock position biopsy, invasive ductal carcinoma, grade   2, ER +99% strong, AL +40% strong, HER-2 2+ on immunohistochemistry, nonamplified on FISH Ki-67 10%     4/14/2021-PET/CT-FDG avid aortopulmonary window lymph node measures 0.9 cm with an SUV of 7.5.  FDG avid left hilar lymph node measures 1 cm with an SUV of 17.2.  Irregular soft tissue density in the right breast measuring 3.7 x 3.8 cm is favored to be secondary to the recent breast biopsy.  1.1 cm pulmonary nodule within the left upper lobe with SUV 9.7 .  Sub-6 mm pulmonary nodules are present in the right lower lobe.  No evidence of lymphadenopathy or metastatic disease in the abdomen.  Focal area of FDG uptake within the central canal posterior to T11-T12 displaced demonstrating an SUV of 5.5 thought to be reactive however MRI is recommended for further evaluation.     She was seen by Dr. Molina who initially planned for breast surgery however  due to the PET abnormalities she has been referred to Dr. Kerr who plans to do a wound on 4/30/2021.  Dr. Molina's and Dr. Kerr's notes reviewed.     Patient denies any family history of breast cancer.  Her mother had lymphoma.  Maternal grandmother had colon cancer.  Prior to that screening mammogram she did not have any palpable abnormalities of either breast.     She has been a heavy smoker for about 40 years and smoked 2 packs/day.  Denies any recent weight changes, new bone pains, cough, abdominal pain nausea vomiting constipation or diarrhea.  She does have anxiety and has been on several medications.  She has recently been started on Xanax to help with the mood and also with insomnia.     Patient had a video-assisted thoracoscopy and mediastinal lymphadenectomy on 4/30/2021.  L5-L6 lymph nodes were biopsied however the small pulmonary nodule in the left upper lobe was  not difficult to find.  Pathology showed moderately differentiated adenocarcinoma which is CK7 and TTF-1 positive.  Consistent with lung primary.  Ki-67 85%.     5/14/2021-MRI of the brain-minimal chronic small vessel ischemic change in the white matter.  Otherwise negative MRI.     5/20/2021-bilateral axillary ultrasound-no evidence of axillary lymphadenopathy in either axilla.  Normal-appearing bilateral axillary lymph nodes visualized.     Cycle 1 cisplatin and Alimta on 5/25/2021.     Cycle 2 cisplatin Alimta 6/15/2021     Cycle 3 cisplatin Alimta 7/7/2021     Completed radiation on 7/12/2021     Port was nonfunctional hence a port study was performed which showed that the port was backed up into the innominate vein and also there was a nonocclusive thrombus at the end of the catheter extending into the superior vena cava.  She was started on anticoagulation with Eliquis.  It was recommended for port revision of the intention to keep the port.     PET/CT 8/5/2021-images independently reviewed and interpreted by me-decrease in size and FDG uptake of the left upper lobe pulmonary nodule as well as mediastinal and left hilar lymphadenopathy representing response to treatment.  Sub-6 mm pulmonary nodule in the left upper lobe new since 4/14/2021.  This could be related to radiation however follow-up CT in 3 months recommended.  Decrease in size of the irregular masslike tissue in the right breast which is postbiopsy hematoma.  This is not PET avid.  New segmental left eighth rib fractures healing.  A new band of sclerosis of the left seventh rib which favored to represent healing nondisplaced fracture.  Follow-up CT recommended.  focal uptake in the duodenum first and second portions.  Could be related to duodenitis.  Indeterminate lesion in the L1 vertebral body not well evaluated on PET/CT.     10/1/2021-MRI of the lumbar spine.  Lesion in the left posterior body of L1 measures 14 x 14 x 12 mm and previously 5 x 5  x 6 mm.  The interval enlargement strongly suggest that it is metastatic lesion.  No additional osseous metastasis noted.   Other chronic changes noted.     10/14/2021-biopsy of the lumbar spine lesion-pathology consistent with poorly differentiated carcinoma.  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     10/26/2021-brain MRI-no evidence of metastatic disease.     10/26/2021-bilateral diagnostic mammogram and ultrasound  Scattered fibroglandular density in both breast.  Postbiopsy hematoma with internal biopsy clip in the upper outer quadrant of the right breast, 8 cm from the nipple.  The hematoma size is decreased to 2.9 cm from 3.6 cm earlier.     Left breast-parenchymal density measuring 9 x 10 mm has markedly decreased.  Few adjacent calcifications which are unchanged.  No new abnormality in the left breast.  At 10:00 region there is some minimal adjacent hypoechoic texture which is difficult to measure but appears smaller since the previous exam.     10/28/2021-PET/CT  New L1 and L2 lytic bone metastasis annually intensely hypermetabolic focus at the left adrenal gland likely represents metastatic disease as well.  Slight decrease in size of the 0.9 x 0.7 cm left upper lobe pulmonary nodule.  There is hypermetabolic activity related to radiation pneumonitis.  The remainder of the nodule is photopenic.  Uncertain etiology of the more intense activity along the right lateral peripheral margin of the 2.6 cm postbiopsy density of the right breast.     She completed radiation to to the lumbar spine on 11/19/2021 11/22/2021-cycle 1 Alimta and Keytruda     3/23/2022-bilateral diagnostic mammogram and ultrasound  Complete resolution of the microcalcifications in the upper outer quadrant of the right breast and decrease in size of the postbiopsy hematoma.  No suspicious abnormalities in the right breast.  Benign-appearing  calcifications at the site of malignancy in the left breast upper inner quadrant.  No other suspicious findings.     4/11/2022 PET/CT-there is new groundglass opacities in the left upper lobe which are most likely postradiation changes.  Other groundglass opacities in the left lung as well as the right lung remained stable.  Increased nodularity of the left adrenal gland with an SUV of 5.6, previously 3.5.    Increased uptake in the L1 vertebra in the right posterior aspect with an SUV of 3.6.  Left L1 sclerosis increased     Due to this the case was discussed in multidisciplinary conference.  The disease progression was significant in the left adrenal gland as well as the L1 vertebra.  Since there were only 2 areas of disease progression it has been decided to proceed with radiation to these 2 spots and continue on Keytruda and Alimta.     Patient also has had worsening of her kidney function.  We initially held treatment as of 5/31/2022 and creatinine bumped up to 2.0, BUN 23.  We then resumed therapy 6/11/2022 with Keytruda alone.  Patient was referred to nephrology.     6/6/2022-MRI of the spine-diffuse marrow replacement in the L1 vertebral body and inferior endplate concavity.  Suspicious for metastatic disease with pathological fracture in the inferior endplate.  Also diffuse signal abnormality in the L2 lamina on the left suspicious for additional metastatic disease.  Abnormality of the first sacral segment suspicious for metastatic disease.  Left adrenal gland appears enlarged.  30 to 40% height loss and L2 vertebral body suggestive of a subacute compression fracture.  Degenerative changes.     7/18/2022-PET/CT  Multiple hypermetabolic osseous lesions with index lesions which have either increased in degree of FDG uptake or newly hypermetabolic lesions representative of metastatic disease and progression of disease.  Possible FDG avid lesion in the left femoral diaphysis however these are incompletely  visualized and in the area of artifactual FDG uptake.  MRI of the left femur recommended for further evaluation.  Increasing FDG uptake of the left adrenal gland.  Focal left hilar hypermetabolic him corresponds to the lymph node which has minimally increased in size compared to April 2022.  New sub-6 mm pulmonary nodules.     7/26/2022-cycle 1 of Taxotere     Patient was admitted to the hospital 8/3/2022 discharged on 8/9/2022.  She was admitted for strokelike symptoms and confusion.  The confusion was thought to be secondary to polypharmacy and probably febrile neutropenia.  She was treated with antibiotics.  Mental status improved subsequently.  MRI of the cervical thoracic and lumbar spine was performed on 8/4/2022.  Images independently reviewed by me.  Multiple metastatic lesions in the spine noted.  There was a lesion on the CT which was concerning.  There is also a sacral Lesion measuring up to 5 cm in transverse dimension on the right.  Patient was symptomatic with pain on the right side of the sacrum.  Radiation oncology recommended radiation to the cervical spine as well as sacrum.  CT head without any obvious abnormalities.     Completed radiation to the cervical spine and sacrum on 8/12/2022    She was admitted to the hospital between 9/23/2022 to 10/12/2022.  She was admitted for poorly controlled pain as well as encephalopathy and multiple falls.  She was diagnosed with a UTI and treated with IV Rocephin.  Pain medications changed and also additional radiation to the right sacrum was performed.  3000 cGy in 10 fractions was administered.  She was discharged on oxycodone 15 mg every 4 hours as needed for pain and OxyContin twice daily.  She was recommended to go to a subacute rehab however she preferred to be discharged home with home physical therapy and Occupational Therapy    9/24/2022-MRI of the pelvis showed osseous metastatic disease in the sacrum worst within the right and probable pathological  fractures bilaterally.    10/4/2022 CT head without any evidence of metastatic disease or acute abnormalities.    10/25/2022-PET/CT-resolution of hypermetabolic activity in the upper lobe and left hilum.  Previously noted new tiny pulmonary nodules have resolved.  Resolution of hypermetabolic activity in the left adrenal gland.  Near complete resolution of hypermetabolic activity in the skeletal metastasis particularly the cervical spine, pelvic bones and proximal left femur.  Maximal SUV at the right sacral metastasis is 2.7 and previously it was 8.9.  Tiny focus of hypermetabolic activity at the GE junction with a maximal SUV of 4.7.  No definite thickening or abnormality noted on the CT.    11/15/2022-lumbar puncture with negative cytology of the CSF.    11/21/2022-MRI of the brain with contrast and MRI of the cervical spine with contrast without any evidence of metastatic disease.    1/12/2023-PET/CT  Hypermetabolic mediastinal lymphadenopathy and multiple liver lesions which are hypermetabolic suggestive of metastasis.  New tiny left pleural effusion is noted.  Progression of bilateral sacral fractures and right L5 transverse process fracture.  There is also an acute/subacute L1 fracture.  Lumbar spine x-rays have been recommended for evaluation of the vertebral height or lumbar MRI to be compared to the priors.    1/23/2023-MRI of the brain  At least 9 separate tiny 2 to 6 mm enhancing lesions in the brain compatible with multiple new brain metastasis.  There has been interval development of 3 separate foci of encephalomalacia concerning for chronic infarcts but these are new since August 2022.  Stable osseous metastasis at C2.    3/27/2023-PET/CT  Significant interval improvement of the previously seen hypermetabolic mediastinal lymphadenopathy as well as hepatic lesions.  Focal area of uptake noted in the gallbladder fundus raising concern for cholecystitis.  0.6 cm pulmonary nodule in the left upper lobe is  "new and follow-up CT in 3 months recommended.  New bilateral groundglass opacities in lower lobes suggestive of atypical pneumonia.  Long segment mild to moderate uptake within the esophagus suggestive of esophagitis.    3/28/2023-MRI of the brain  There are at least 9 separate 2 to 6 mm enhancing lesions in the superficial cortex of the brain which have shown a slight interval decrease in size and also decreased enhancement noted.  No new enhancing lesions noted.  Concern for focal areas of leptomeningeal seeding.  Unchanged from previous MRI tiny old lacunar infarcts in the left MCA territory.    Vitals:    03/29/23 0811   BP: 116/77   Pulse: (!) 122   Resp: 16   Temp: 99.1 °F (37.3 °C)   TempSrc: Temporal   SpO2: 96%   Weight: 63.2 kg (139 lb 6.4 oz)   Height: 175.3 cm (69.02\")   PainSc:   5         PHYSICAL EXAMINATION:    General: Oriented to person, place, and time.  Ill-appearing.  Pale.  HEENT: EOMI.  No bruising/edema noted.  Chest/Lungs: Clear to auscultation bilaterally. Right chest mediport in place  Heart: RRR, sinus tachycardia  Extremities: Bilateral lower extremity without any swelling.  Decreased strength in both lower extremities.  Left lower extremity with scant redness to upper foot  No focal deficits.    I have reexamined the patient and the results are consistent with the previously documented exam. Salma Snell MD     DIAGNOSTIC DATA:  Results Review:            Lab Results   Component Value Date    NEUTROABS 12.15 (H) 03/21/2023                     IMAGING:    Duplex Venous Lower Extremity - Left CAR (03/21/2023 10:25)    PET/CT 1/12/2023 and MRI of the brain 1/23/2023-images independently reviewed and interpreted by me.    ASSESSMENT:  This is a 57 y.o. female with:     *Metastatic non-small cell lung cancer  · She was initially diagnosed with stage III (T1N2M0) disease.    · She received initial therapy with cisplatin and Alimta concurrent with radiation therapy beginning 5/25/2021. "    · There were indeterminate findings in the lumbar spine at L1 and it was recommended to monitor this over time.    · Patient completed 3 cycles cisplatin and Alimta 7/7/2021 and completed radiation therapy 7/12/2021.    · PET scan 8/6/2021 showed good response to treatment.    · MRI lumbar spine 10/1/2021 with increase in size of the L1 lesion.    · Biopsy of the L1 lesion on 10/14/2022 confirmed metastatic adenocarcinoma of lung origin, PD-L1 TPS 0.    · PET scan 10/26/2021 with involvement of left adrenal and L1/L2 only.    · Patient received radiation therapy to L1/L2 on 11/19/2021.  She also received radiation to the left adrenal gland.    · She initiated further systemic therapy on 11/22/2021 with Keytruda and Alimta.   · Follow-up PET scan 1/18/2022 with decrease in small left upper lobe pulmonary nodule, resolution of activity at L1/L2, no new sites of disease.    · Guardant 360 CT DNA level was monitored and was decreasing.    · PET scan 4/11/2022 with progression in left adrenal and L1.    · Maintained systemic therapy with Keytruda and Alimta and received additional radiation to left adrenal and L1.  Alimta however held since 5/10/2022 due to renal dysfunction.  Radiation completed to lumbar spine 7/14/2022.    · PET scan 7/18/2022 with multiple areas of progression of the spine and right sacrum.    · Guardant 360 analysis 7/20/2022 with no actionable mutations.    · Change in therapy to single agent palliative Taxotere initiated 7/26/2022.    · Altered mental status and febrile neutropenia requiring hospitalization 8/2 - 8/9/2022.  During that admission, MRI brain, cervical, thoracic, lumbar spine performed 8/4/2022 in addition to CT cervical spine 8/5/2022.  There was evidence of C2 metastatic lesion with some dural enhancement, compression deformity at T7 with mild edema suggesting acute to subacute fracture, 5 mm T12 spinous process metastasis, multifocal disease in the lumbar spine and sacrum,  largest involving sacral ala to the right 5 cm in transverse dimension.  Loss of height at L2 25%.  · Patient received cycle 3 Taxotere 9/6/2022 with Neulasta support.    · She has been continuing as well on Xgeva every 4 weeks (last received 9/6/2022).  · Patient did undergo MRI lumbar spine and pelvis on 9/24/2022.  MRI pelvis showed bilateral sacral fractures with marrow infiltration related to metastasis more prominent on the right.  Lesion on the right 3.5 x 4.1 x 4.3 cm.  Also probable metastasis along posterior acetabulum on the right 1.5 cm.  MRI lumbar spine with stable L1 metastasis, new edema endplate T12 possibly stress reaction versus developing new metastasis, lesion at T12 spinous process unchanged.   · Radiation oncology consulted with plans for palliative treatment of the right sacral metastasis.  Treatment initiated 9/28/2022  · Completed radiation to the right sacral metastasis on 10/11/2022.  · PET/CT 10/25/2022 without any metabolic uptake in the lung or the skeletal metastasis.  This is indicative of a positive response to Taxotere.  · Pain persistent sitting and walking position and not present when she is laying down.  · Patient is still very active and performance status is very poor.  Due to this reason chemotherapy will be continued to be held.  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  · Due to this reason an MRI of the brain was obtained but unfortunately this was performed without contrast.  Reviewed the MRI and no evidence of metastatic disease.  · 11/15/2022 lumbar puncture shows no evidence of malignancy in the CSF, elevated protein at 65.2, glucose normal at 58, culture no growth in 3 days  · Repeat MRI of the brain with contrast 11/21/2022 does not show any obvious abnormalities.  There is no enhancement of the CSF.  MRI of the cervical spine with no abnormalities.  · Patient unable to undergo MRI of the  thoracic and lumbar spine due to mental status and unable to being in 1 position as well as with incontinence.  · 1/4/2023-mental status has improved significantly.  Patient reports decreased oral intake and nausea and vomiting.  She is also reporting abdominal discomfort.  · 1/12/2023-PET/CT with mediastinal lymphadenopathy as well as multiple liver lesions suggestive of disease progression.  · 1/23/2023-MRI of the brain with at least 9 different foci of small mets measuring up to 6 mm.  There is also areas of encephalomalacia concerning for chronic infarcts.    · Resumed Taxotere 1/31/2023.  Dose reduced to 60 mg per metered square.  · 3/27/2023-PET/CT shows significant response in the chest as well as the hepatic lesions.  · 3/28/2023-MRI of the brain-improvement in the metastatic disease to the brain with decrease in the size as well as decrease in enhancement noted.  · Patient scheduled for Taxotere on 4/4/2023.  Given that there is improvement in the disease we will continue with Taxotere.  · It is unclear to me as to why the disease in the brain has responded given the fact that she did not receive radiation or any treatment which would potentially cross the blood-brain barrier.  · We will continue with surveillance MRIs and PET CTs  · Neuropathy stable.     *History of bilateral stage I breast cancer  · Patient with bilateral breast cancer, both stage I ([id number]), grade 2, ER/NE positive and HER2/melinda negative with low Ki-67.    · Patient initiated adjnuvant letrozole in May 2021.  · Letrozole discontinued as she is on Taxotere  · No changes    *Mental status changes  · Patient's mental status is back at baseline.  · Significant improvement  · Currently off all psychotropic medications.  · MRI of the brain 3/28/2023 with slight improvement in the 9 lesions noted previously  · Continue MRIs as needed     *Cancer related pain  · Patient has been receiving Duragesic patch 50 mcg every 72 hours in addition to  oxycodone 15 mg every 4 hours for breakthrough pain.  Duragesic dose had been escalated recently in the outpatient setting due to worsening pain  · On admission 9/23/2022, Duragesic patch increased to 75 mcg daily, added Decadron 4 mg IV every 6 hours with continuation of oxycodone 15 mg every 4 hours as needed for breakthrough pain in addition of Dilaudid 1 mg every 2 hours as needed for breakthrough pain.  · Patient with increasing side effects from narcotics, Duragesic decreased on 9/25/2022 from 75 down to 50 mcg patch every 72 hours.  · Dexamethasone dose decreased to 4 mg p.o. every 12 hours on 9/27/2022  · Initiated palliative radiation to the right sacrum on 9/28/2022  · Patient became confused after receiving Dilaudid.  Patient and family asked to discontinue Dilaudid.  · Duragesic patch dose was increased to 75 mcg/h on 9/30/2022. Subsequent decrease to 50 mcg due to confusion  · Gabapentin was discontinued due to the sedating effect.  · Oxycodone is being used for breakthrough pain.  · 10/3 transition to sustained release oxycodone 40 mg bid and fentanyl patch stopped  · Poorly controlled, pain particularly worse in sitting and standing position.  · Unsure if extends is contributing to mental status changes as previously patient had been very sensitive to pain meds.  · Due to this reason we will discontinue the Xtampza ER and switch to OxyContin 20 mg twice daily.  · She continues to experience severe mental status changes.  · Long-acting pain medications discontinued due to mental status changes and she is currently on oxycodone 15 mg as needed.  · Reports better pain control after the epidural injection of the spine.  · Pain well controlled at this time      *Anxiety/depression  · Currently all medications have been discontinued due to mental status changes  · Continue Xanax as needed  · Well-controlled     *Insomnia  · She has been receiving Xanax as needed       *Anemia  · Hemoglobin 7.6 3/14/2023.   B12, ferritin, iron studies, and folate unremarkable.  · 1 unit of PRBC given 3/16/2023  · 3/21/2023 hemoglobin 8.4  · 3/14/2023-iron studies reviewed and iron saturation 24%, TIBC 329, ferritin 189  · Vitamin B12 borderline at 376, folic acid normal at greater than 20  · Recommend starting B12 supplementation     *Peripheral neuropathy  · Patient was on gabapentin 300 mg twice daily.  · Gabapentin was discontinued on 9/30/2022.  · Neuropathy stable  · Patient noting heaviness to both lower extremities when seen on 3/21/2023, possible worsening neuropathy secondary to Taxotere?  Reevaluate further at next visit once anticoagulant changed and possibly acute DVT pain eliminated.     *Nausea vomiting and abdominal discomfort  · Continues to experience vomiting.  · Abdominal discomfort has resolved  · PET/CT with liver metastasis.  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  · Continue Compazine as well as Zofran as needed  · Nausea and vomiting have pretty much resolved.  · Noted to have area of focal uptake on the PET/CT over the gallbladder which is suggestive of cholecystitis.  · A course of Levaquin will be    *Mouth sores  · 3/21/2023 patient reporting mouth sores or not being helped with her current Magic mouth rinse.  Discussed with Brooklynn Powell RN and current prescription does not have steroids added, requested dexamethasone to be added to new prescription.    *GI prophylaxis  · Continue omeprazole daily  · No changes  · PET/CT suggestive of esophagitis    *Deconditioning  · Steadily improving  · She is ambulating more than before    *Hypokalemia  · Discontinued potassium replacement  · Repeat BMP on 4/4/2020    *Brain metastasis  · Repeat MRI of the brain in March 28 2023  · Improvement in disease noted    *Abnormal LFTs  · ALT mildly elevated  · Monitor    *Acute left soleal deep vein thrombosis 3/21/2023  · Patient seen 3/21/2023 after having a left lower extremity Doppler due to reports of  severe left lower extremity pain with redness and some swelling noted.  Preliminary results positive for acute soleal DVT.  Patient continues on Eliquis 5 mg twice daily and denies missing doses.  Case discussed with Dr. Snell and we will obtain additional lab work including beta-2 glycoprotein, lupus anticoagulant, and anticardiolipin antibody.  We will proceed with switching her Eliquis to Xarelto 15 mg twice daily x21 days then 20 mg daily thereafter.  She was given samples for the loading dose and will need a prescription at a later date for the 20 mg once daily.  · Hypercoagulability work-up particularly antiphospholipid syndrome labs have been reviewed and negative  · Continue Xarelto, no indication to switch to Coumadin    RECOMMENDATIONS:    1. Begin Xarelto 15 mg twice daily x21 days, samples given to cover this  2. Start Xarelto 20 mg daily after the loading dose  3. Continue Gwen's Magic mouth rinse with dexamethasone.  4. Patient will treat pain with oxycodone 15 mg every 4 hours as needed  5. Levaquin for treatment of possible cholecystitis  6. Next dose of Taxotere on 4/4/2023  7. Continue every 3 weekly Taxol    I spent 42 minutes caring for Holli on this date of service.  Discussed with Dr. Jean. This time includes time spent by me in the following activities: preparing for the visit, reviewing tests, obtaining and/or reviewing a separately obtained history, performing a medically appropriate examination and/or evaluation, counseling and educating the patient/family/caregiver, ordering medications, tests, or procedures, documenting information in the medical record, independently interpreting results and communicating that information with the patient/family/caregiver and care coordination.       Salma Snell MD

## 2023-04-04 ENCOUNTER — APPOINTMENT (OUTPATIENT)
Dept: ONCOLOGY | Facility: HOSPITAL | Age: 58
End: 2023-04-04
Payer: COMMERCIAL

## 2023-04-04 ENCOUNTER — INFUSION (OUTPATIENT)
Dept: ONCOLOGY | Facility: HOSPITAL | Age: 58
End: 2023-04-04
Payer: COMMERCIAL

## 2023-04-04 VITALS
RESPIRATION RATE: 18 BRPM | WEIGHT: 136.6 LBS | SYSTOLIC BLOOD PRESSURE: 161 MMHG | TEMPERATURE: 97.7 F | BODY MASS INDEX: 20.16 KG/M2 | OXYGEN SATURATION: 98 % | HEART RATE: 90 BPM | DIASTOLIC BLOOD PRESSURE: 81 MMHG

## 2023-04-04 DIAGNOSIS — C79.51 NON-SMALL CELL LUNG CANCER METASTATIC TO BONE: Primary | ICD-10-CM

## 2023-04-04 DIAGNOSIS — C34.90 NON-SMALL CELL LUNG CANCER METASTATIC TO BONE: Primary | ICD-10-CM

## 2023-04-04 DIAGNOSIS — R35.0 URINE FREQUENCY: ICD-10-CM

## 2023-04-04 DIAGNOSIS — Z45.2 ENCOUNTER FOR FITTING AND ADJUSTMENT OF VASCULAR CATHETER: ICD-10-CM

## 2023-04-04 LAB
ALBUMIN SERPL-MCNC: 3.5 G/DL (ref 3.5–5.2)
ALBUMIN/GLOB SERPL: 1.5 G/DL (ref 1.1–2.4)
ALP SERPL-CCNC: 106 U/L (ref 38–116)
ALT SERPL W P-5'-P-CCNC: 9 U/L (ref 0–33)
ANION GAP SERPL CALCULATED.3IONS-SCNC: 14.4 MMOL/L (ref 5–15)
AST SERPL-CCNC: 13 U/L (ref 0–32)
BACTERIA UR QL AUTO: NEGATIVE /HPF
BASOPHILS # BLD AUTO: 0.06 10*3/MM3 (ref 0–0.2)
BASOPHILS NFR BLD AUTO: 0.4 % (ref 0–1.5)
BILIRUB SERPL-MCNC: <0.2 MG/DL (ref 0.2–1.2)
BILIRUB UR QL STRIP: NEGATIVE
BUN SERPL-MCNC: 19 MG/DL (ref 6–20)
BUN/CREAT SERPL: 16.2 (ref 7.3–30)
CALCIUM SPEC-SCNC: 8.6 MG/DL (ref 8.5–10.2)
CHLORIDE SERPL-SCNC: 106 MMOL/L (ref 98–107)
CLARITY UR: CLEAR
CO2 SERPL-SCNC: 20.6 MMOL/L (ref 22–29)
COLOR UR: YELLOW
CREAT SERPL-MCNC: 1.17 MG/DL (ref 0.6–1.1)
DEPRECATED RDW RBC AUTO: 62.2 FL (ref 37–54)
EGFRCR SERPLBLD CKD-EPI 2021: 53.9 ML/MIN/1.73
EOSINOPHIL # BLD AUTO: 0.02 10*3/MM3 (ref 0–0.4)
EOSINOPHIL NFR BLD AUTO: 0.1 % (ref 0.3–6.2)
ERYTHROCYTE [DISTWIDTH] IN BLOOD BY AUTOMATED COUNT: 17.2 % (ref 12.3–15.4)
GLOBULIN UR ELPH-MCNC: 2.4 GM/DL (ref 1.8–3.5)
GLUCOSE SERPL-MCNC: 136 MG/DL (ref 74–124)
GLUCOSE UR STRIP-MCNC: NEGATIVE MG/DL
HCT VFR BLD AUTO: 27.3 % (ref 34–46.6)
HGB BLD-MCNC: 8.1 G/DL (ref 12–15.9)
HGB UR QL STRIP.AUTO: ABNORMAL
HYALINE CASTS UR QL AUTO: NORMAL /LPF
IMM GRANULOCYTES # BLD AUTO: 0.57 10*3/MM3 (ref 0–0.05)
IMM GRANULOCYTES NFR BLD AUTO: 3.6 % (ref 0–0.5)
KETONES UR QL STRIP: NEGATIVE
LEUKOCYTE ESTERASE UR QL STRIP.AUTO: NEGATIVE
LYMPHOCYTES # BLD AUTO: 0.37 10*3/MM3 (ref 0.7–3.1)
LYMPHOCYTES NFR BLD AUTO: 2.3 % (ref 19.6–45.3)
MAGNESIUM SERPL-MCNC: 1.9 MG/DL (ref 1.8–2.5)
MCH RBC QN AUTO: 29.9 PG (ref 26.6–33)
MCHC RBC AUTO-ENTMCNC: 29.7 G/DL (ref 31.5–35.7)
MCV RBC AUTO: 100.7 FL (ref 79–97)
MONOCYTES # BLD AUTO: 0.45 10*3/MM3 (ref 0.1–0.9)
MONOCYTES NFR BLD AUTO: 2.8 % (ref 5–12)
NEUTROPHILS NFR BLD AUTO: 14.52 10*3/MM3 (ref 1.7–7)
NEUTROPHILS NFR BLD AUTO: 90.8 % (ref 42.7–76)
NITRITE UR QL STRIP: NEGATIVE
NRBC BLD AUTO-RTO: 0 /100 WBC (ref 0–0.2)
PH UR STRIP.AUTO: 6.5 [PH] (ref 4.5–8)
PHOSPHATE SERPL-MCNC: 3.9 MG/DL (ref 2.5–4.5)
PLATELET # BLD AUTO: 128 10*3/MM3 (ref 140–450)
PMV BLD AUTO: 8.4 FL (ref 6–12)
POTASSIUM SERPL-SCNC: 3.7 MMOL/L (ref 3.5–4.7)
PROT SERPL-MCNC: 5.9 G/DL (ref 6.3–8)
PROT UR QL STRIP: ABNORMAL
RBC # BLD AUTO: 2.71 10*6/MM3 (ref 3.77–5.28)
RBC # UR STRIP: NORMAL /HPF
REF LAB TEST METHOD: NORMAL
SODIUM SERPL-SCNC: 141 MMOL/L (ref 134–145)
SP GR UR STRIP: >=1.03 (ref 1–1.03)
SQUAMOUS #/AREA URNS HPF: NORMAL /HPF
UROBILINOGEN UR QL STRIP: ABNORMAL
WBC # UR STRIP: NORMAL /HPF
WBC NRBC COR # BLD: 15.99 10*3/MM3 (ref 3.4–10.8)

## 2023-04-04 PROCEDURE — 25010000002 DIPHENHYDRAMINE PER 50 MG: Performed by: NURSE PRACTITIONER

## 2023-04-04 PROCEDURE — 83735 ASSAY OF MAGNESIUM: CPT

## 2023-04-04 PROCEDURE — 36415 COLL VENOUS BLD VENIPUNCTURE: CPT

## 2023-04-04 PROCEDURE — 25010000002 HEPARIN LOCK FLUSH PER 10 UNITS: Performed by: INTERNAL MEDICINE

## 2023-04-04 PROCEDURE — 25010000002 DOCETAXEL 20 MG/ML SOLUTION 8 ML VIAL: Performed by: NURSE PRACTITIONER

## 2023-04-04 PROCEDURE — 96413 CHEMO IV INFUSION 1 HR: CPT

## 2023-04-04 PROCEDURE — 96372 THER/PROPH/DIAG INJ SC/IM: CPT

## 2023-04-04 PROCEDURE — 25010000002 GRANISETRON PER 100 MCG: Performed by: NURSE PRACTITIONER

## 2023-04-04 PROCEDURE — 85025 COMPLETE CBC W/AUTO DIFF WBC: CPT

## 2023-04-04 PROCEDURE — 96375 TX/PRO/DX INJ NEW DRUG ADDON: CPT

## 2023-04-04 PROCEDURE — 81001 URINALYSIS AUTO W/SCOPE: CPT

## 2023-04-04 PROCEDURE — 84100 ASSAY OF PHOSPHORUS: CPT

## 2023-04-04 PROCEDURE — 80053 COMPREHEN METABOLIC PANEL: CPT

## 2023-04-04 PROCEDURE — 25010000002 DENOSUMAB 120 MG/1.7ML SOLUTION: Performed by: NURSE PRACTITIONER

## 2023-04-04 RX ORDER — SODIUM CHLORIDE 9 MG/ML
250 INJECTION, SOLUTION INTRAVENOUS ONCE
Status: COMPLETED | OUTPATIENT
Start: 2023-04-04 | End: 2023-04-04

## 2023-04-04 RX ORDER — GRANISETRON HYDROCHLORIDE 1 MG/ML
1 INJECTION INTRAVENOUS ONCE
Status: COMPLETED | OUTPATIENT
Start: 2023-04-04 | End: 2023-04-04

## 2023-04-04 RX ORDER — SODIUM CHLORIDE 0.9 % (FLUSH) 0.9 %
10 SYRINGE (ML) INJECTION AS NEEDED
OUTPATIENT
Start: 2023-04-04

## 2023-04-04 RX ORDER — DIPHENHYDRAMINE HYDROCHLORIDE 50 MG/ML
50 INJECTION INTRAMUSCULAR; INTRAVENOUS AS NEEDED
Status: CANCELLED | OUTPATIENT
Start: 2023-04-04

## 2023-04-04 RX ORDER — FAMOTIDINE 10 MG/ML
20 INJECTION, SOLUTION INTRAVENOUS AS NEEDED
Status: CANCELLED | OUTPATIENT
Start: 2023-04-04

## 2023-04-04 RX ORDER — SODIUM CHLORIDE 0.9 % (FLUSH) 0.9 %
10 SYRINGE (ML) INJECTION AS NEEDED
Status: DISCONTINUED | OUTPATIENT
Start: 2023-04-04 | End: 2023-04-04 | Stop reason: HOSPADM

## 2023-04-04 RX ORDER — HEPARIN SODIUM (PORCINE) LOCK FLUSH IV SOLN 100 UNIT/ML 100 UNIT/ML
500 SOLUTION INTRAVENOUS AS NEEDED
Status: DISCONTINUED | OUTPATIENT
Start: 2023-04-04 | End: 2023-04-04 | Stop reason: HOSPADM

## 2023-04-04 RX ORDER — FAMOTIDINE 10 MG/ML
20 INJECTION, SOLUTION INTRAVENOUS ONCE
Status: COMPLETED | OUTPATIENT
Start: 2023-04-04 | End: 2023-04-04

## 2023-04-04 RX ORDER — HEPARIN SODIUM (PORCINE) LOCK FLUSH IV SOLN 100 UNIT/ML 100 UNIT/ML
500 SOLUTION INTRAVENOUS AS NEEDED
OUTPATIENT
Start: 2023-04-04

## 2023-04-04 RX ADMIN — SODIUM CHLORIDE 250 ML: 9 INJECTION, SOLUTION INTRAVENOUS at 10:40

## 2023-04-04 RX ADMIN — FAMOTIDINE 20 MG: 10 INJECTION INTRAVENOUS at 10:41

## 2023-04-04 RX ADMIN — DENOSUMAB 120 MG: 120 INJECTION SUBCUTANEOUS at 12:35

## 2023-04-04 RX ADMIN — Medication 10 ML: at 12:41

## 2023-04-04 RX ADMIN — GRANISETRON HYDROCHLORIDE 1 MG: 1 INJECTION INTRAVENOUS at 10:45

## 2023-04-04 RX ADMIN — Medication 500 UNITS: at 12:41

## 2023-04-04 RX ADMIN — DIPHENHYDRAMINE HYDROCHLORIDE 25 MG: 50 INJECTION, SOLUTION INTRAMUSCULAR; INTRAVENOUS at 10:45

## 2023-04-04 RX ADMIN — SODIUM CHLORIDE 95 MG: 9 INJECTION, SOLUTION INTRAVENOUS at 11:32

## 2023-04-04 NOTE — NURSING NOTE
Patient reports frequency with urination and states that sometimes she will only dribble a small amount. She denies burning or other signs of UTI. Discussed with KORI Fields and orders received for U/A with microscopic with results noted. Patient states that she thinks she has an overactive bladder. Encouraged her to discuss with her PCP.   Patient states that she used a file to remove dry skin from left elbow approx 2 weeks ago.. Elbow is red and swollen with scabbed area. No warmth, streaking or drainage noted. Denies fevers.She has been using Neosporin ointment to site, and is due to complete a course of Levaquin today that was prescribed last week per Dr. Snell. She confirms taking Xarelto as prescribed. Reviewed with FELI Briones. Per Anali, she can continue antibiotic ointment to site, watch for any fever, or increase in redness. Patient and spouse instructed and verbalize understanding. Message sent to scheduling for patient to return tomorrow for Udenyca injection.

## 2023-04-04 NOTE — PATIENT INSTRUCTIONS
Monitor left elbow for drainage, increased redness, or increased swelling. Monitor temperature and call for any fever of 100.4 or greater. Call  this office at [phone] if any of these things happen or any other problems or concerns.

## 2023-04-05 ENCOUNTER — INFUSION (OUTPATIENT)
Dept: ONCOLOGY | Facility: HOSPITAL | Age: 58
End: 2023-04-05
Payer: COMMERCIAL

## 2023-04-05 ENCOUNTER — TELEPHONE (OUTPATIENT)
Dept: ONCOLOGY | Facility: CLINIC | Age: 58
End: 2023-04-05
Payer: COMMERCIAL

## 2023-04-05 VITALS
TEMPERATURE: 98 F | OXYGEN SATURATION: 97 % | SYSTOLIC BLOOD PRESSURE: 115 MMHG | RESPIRATION RATE: 18 BRPM | HEART RATE: 110 BPM | DIASTOLIC BLOOD PRESSURE: 77 MMHG

## 2023-04-05 DIAGNOSIS — C34.90 NON-SMALL CELL LUNG CANCER METASTATIC TO BONE: Primary | ICD-10-CM

## 2023-04-05 DIAGNOSIS — C79.51 NON-SMALL CELL LUNG CANCER METASTATIC TO BONE: Primary | ICD-10-CM

## 2023-04-05 DIAGNOSIS — M25.522 PAIN AND SWELLING OF LEFT ELBOW: Primary | ICD-10-CM

## 2023-04-05 DIAGNOSIS — M25.429 REDNESS AND SWELLING OF ELBOW: ICD-10-CM

## 2023-04-05 DIAGNOSIS — M25.422 PAIN AND SWELLING OF LEFT ELBOW: Primary | ICD-10-CM

## 2023-04-05 DIAGNOSIS — R23.8 REDNESS AND SWELLING OF ELBOW: ICD-10-CM

## 2023-04-05 PROCEDURE — 25010000002 PEGFILGRASTIM-CBQV 6 MG/0.6ML SOLUTION PREFILLED SYRINGE: Performed by: INTERNAL MEDICINE

## 2023-04-05 PROCEDURE — 96372 THER/PROPH/DIAG INJ SC/IM: CPT

## 2023-04-05 RX ORDER — AMOXICILLIN AND CLAVULANATE POTASSIUM 875; 125 MG/1; MG/1
1 TABLET, FILM COATED ORAL 2 TIMES DAILY
Qty: 20 TABLET | Refills: 0 | Status: SHIPPED | OUTPATIENT
Start: 2023-04-05 | End: 2023-04-15

## 2023-04-05 RX ADMIN — PEGFILGRASTIM-CBQV 6 MG: 6 INJECTION, SOLUTION SUBCUTANEOUS at 12:52

## 2023-04-05 NOTE — NURSING NOTE
While here for Udenyca injection, patient was seen by Dr. Rico to evaluate red,swolledn left elbow. Per Dr. Rico, she needs to be seen by orthopedic surgeon to evaluate for bursitis. Discussed with Britni Herrera, clinical RN for Dr. Rico. She will arrange appointment. Prescription for Augmentin 875 mg bid x 10 days escribed. She will return in 1 week for CBC with RN review. Patient and spouse verbalize understanding. Message sent to scheduling.

## 2023-04-05 NOTE — TELEPHONE ENCOUNTER
Caller: Holli Patel    Relationship: Self    Best call back number: [phone]    What is the best time to reach you: ANYTIME     Who are you requesting to speak with (clinical staff, provider,  specific staff member): SCHEDULING     What was the call regarding: PT IS SCHEDULED FOR LAB AND RN REVIEW ON 4-12 AT NOON, PT WANTS TO SEE IF SHE CAN GET THE APPT MOVED EARLIER IN THE DAY TO 8 OR 9 IF NOT CAN APPT BE MOVED TO Thursday 8 OR 9 ALSO     Do you require a callback: YES TO ADVISE

## 2023-04-13 ENCOUNTER — LAB (OUTPATIENT)
Dept: LAB | Facility: HOSPITAL | Age: 58
End: 2023-04-13
Payer: COMMERCIAL

## 2023-04-13 ENCOUNTER — CLINICAL SUPPORT (OUTPATIENT)
Dept: ONCOLOGY | Facility: HOSPITAL | Age: 58
End: 2023-04-13
Payer: COMMERCIAL

## 2023-04-13 DIAGNOSIS — C79.51 NON-SMALL CELL LUNG CANCER METASTATIC TO BONE: ICD-10-CM

## 2023-04-13 DIAGNOSIS — C34.90 NON-SMALL CELL LUNG CANCER METASTATIC TO BONE: ICD-10-CM

## 2023-04-13 LAB
BASOPHILS # BLD AUTO: 0.02 10*3/MM3 (ref 0–0.2)
BASOPHILS NFR BLD AUTO: 0.1 % (ref 0–1.5)
DEPRECATED RDW RBC AUTO: 60.7 FL (ref 37–54)
EOSINOPHIL # BLD AUTO: 0.05 10*3/MM3 (ref 0–0.4)
EOSINOPHIL NFR BLD AUTO: 0.2 % (ref 0.3–6.2)
ERYTHROCYTE [DISTWIDTH] IN BLOOD BY AUTOMATED COUNT: 17 % (ref 12.3–15.4)
HCT VFR BLD AUTO: 28.7 % (ref 34–46.6)
HGB BLD-MCNC: 8.6 G/DL (ref 12–15.9)
IMM GRANULOCYTES # BLD AUTO: 3.19 10*3/MM3 (ref 0–0.05)
IMM GRANULOCYTES NFR BLD AUTO: 11.8 % (ref 0–0.5)
LYMPHOCYTES # BLD AUTO: 1.13 10*3/MM3 (ref 0.7–3.1)
LYMPHOCYTES NFR BLD AUTO: 4.2 % (ref 19.6–45.3)
MCH RBC QN AUTO: 30.2 PG (ref 26.6–33)
MCHC RBC AUTO-ENTMCNC: 30 G/DL (ref 31.5–35.7)
MCV RBC AUTO: 100.7 FL (ref 79–97)
MONOCYTES # BLD AUTO: 1.72 10*3/MM3 (ref 0.1–0.9)
MONOCYTES NFR BLD AUTO: 6.4 % (ref 5–12)
NEUTROPHILS NFR BLD AUTO: 20.88 10*3/MM3 (ref 1.7–7)
NEUTROPHILS NFR BLD AUTO: 77.3 % (ref 42.7–76)
NRBC BLD AUTO-RTO: 1.3 /100 WBC (ref 0–0.2)
PLATELET # BLD AUTO: 196 10*3/MM3 (ref 140–450)
PMV BLD AUTO: 9.1 FL (ref 6–12)
RBC # BLD AUTO: 2.85 10*6/MM3 (ref 3.77–5.28)
WBC NRBC COR # BLD: 26.99 10*3/MM3 (ref 3.4–10.8)

## 2023-04-13 PROCEDURE — 36415 COLL VENOUS BLD VENIPUNCTURE: CPT

## 2023-04-13 PROCEDURE — G0463 HOSPITAL OUTPT CLINIC VISIT: HCPCS

## 2023-04-13 PROCEDURE — 85025 COMPLETE CBC W/AUTO DIFF WBC: CPT

## 2023-04-13 NOTE — NURSING NOTE
Patient is here for lab review with RN.  CBC reviewed, counts are stable for this patient at this time. Patient has no complaints. Copy of labs given to patient and follow up appointment reviewed. Patient is instructed to call the office with any concerns or new symptoms prior to next visit. Patient verbalized understanding and discharged in stable condition.    Lab Results   Component Value Date    WBC 26.99 (H) 04/13/2023    HGB 8.6 (L) 04/13/2023    HCT 28.7 (L) 04/13/2023    .7 (H) 04/13/2023     04/13/2023

## 2023-04-17 DIAGNOSIS — F41.9 ANXIETY: ICD-10-CM

## 2023-04-17 DIAGNOSIS — C34.90 NON-SMALL CELL LUNG CANCER METASTATIC TO BONE: ICD-10-CM

## 2023-04-17 DIAGNOSIS — C79.51 NON-SMALL CELL LUNG CANCER METASTATIC TO BONE: ICD-10-CM

## 2023-04-17 RX ORDER — OXYCODONE HYDROCHLORIDE 15 MG/1
15 TABLET ORAL EVERY 4 HOURS PRN
Qty: 180 TABLET | Refills: 0 | Status: SHIPPED | OUTPATIENT
Start: 2023-04-17

## 2023-04-17 NOTE — TELEPHONE ENCOUNTER
"Contacted Holli, spoke with both her and her  Jelani. Holli was just recently tested for a UTI, negative. She is experiencing frequency more so now then before. Lots of pressure on bladder, painful, will only urinate a little at a time. She is undergoing chemo treatment. She is willing to do a \"doxi\" video visit, as long as her  is present. She prefers MD Nirav. Possible to fit patient in?   "

## 2023-04-17 NOTE — TELEPHONE ENCOUNTER
Caller: BK CARPENTER    Relationship: Emergency Contact    Best call back number: [phone]    What medication are you requesting: SOMETHING FOR OVERACTIVE BLADDER    What are your current symptoms: FREQUENT URINATION    Have you had these symptoms before:    [x] Yes  [] No    Have you been treated for these symptoms before:   [] Yes  [x] No    If a prescription is needed, what is your preferred pharmacy and phone number:  [id number] - [place], [place] - [address] [phone]  - [phone] FX        Additional notes: PATIENTS  STATES THAT THE PATIENT HAS BEEN GETTING UP FREQUENTLY TO USE THE BATHROOM.     PLEASE ADVISE

## 2023-04-18 NOTE — TELEPHONE ENCOUNTER
Pt had a urine done on 4/4/23. Mr. Patel stated that it is more pressure on her bladder and her bladder is overactive and she gets up to use the restroom at all times and only a little comes out    Pt is now scheduled for the first video visit slot on 4/24/23.

## 2023-04-18 NOTE — TELEPHONE ENCOUNTER
Spoke with Jelani in great length she just keeps getting up every 10 minutes thinking she has to go no burning pain etc. Explained to him the difference in OAB and possible retention will talk with Mracelle a bit more and let us know what he is going to do will keep video visit upcoming

## 2023-04-19 RX ORDER — ALPRAZOLAM 0.5 MG/1
TABLET ORAL
Qty: 120 TABLET | Refills: 0 | Status: SHIPPED | OUTPATIENT
Start: 2023-04-19 | End: 2023-04-24 | Stop reason: SDUPTHER

## 2023-04-24 ENCOUNTER — TELEMEDICINE (OUTPATIENT)
Dept: FAMILY MEDICINE CLINIC | Facility: CLINIC | Age: 58
End: 2023-04-24
Payer: COMMERCIAL

## 2023-04-24 ENCOUNTER — OFFICE VISIT (OUTPATIENT)
Dept: ORTHOPEDIC SURGERY | Facility: CLINIC | Age: 58
End: 2023-04-24
Payer: COMMERCIAL

## 2023-04-24 VITALS — HEIGHT: 63 IN | BODY MASS INDEX: 23.92 KG/M2 | WEIGHT: 135 LBS

## 2023-04-24 VITALS — HEIGHT: 63 IN | WEIGHT: 135 LBS | BODY MASS INDEX: 23.92 KG/M2

## 2023-04-24 DIAGNOSIS — C34.90 NON-SMALL CELL LUNG CANCER METASTATIC TO BONE: ICD-10-CM

## 2023-04-24 DIAGNOSIS — Z79.899 HIGH RISK MEDICATION USE: ICD-10-CM

## 2023-04-24 DIAGNOSIS — Z86.69 HISTORY OF MIGRAINE: ICD-10-CM

## 2023-04-24 DIAGNOSIS — M25.522 ELBOW PAIN, CHRONIC, LEFT: ICD-10-CM

## 2023-04-24 DIAGNOSIS — G89.29 ELBOW PAIN, CHRONIC, LEFT: ICD-10-CM

## 2023-04-24 DIAGNOSIS — N32.81 OAB (OVERACTIVE BLADDER): ICD-10-CM

## 2023-04-24 DIAGNOSIS — M71.122 SEPTIC OLECRANON BURSITIS OF LEFT ELBOW: Primary | ICD-10-CM

## 2023-04-24 DIAGNOSIS — F41.9 ANXIETY: Primary | ICD-10-CM

## 2023-04-24 DIAGNOSIS — C79.51 NON-SMALL CELL LUNG CANCER METASTATIC TO BONE: ICD-10-CM

## 2023-04-24 DIAGNOSIS — C79.31 METASTASIS TO BRAIN: ICD-10-CM

## 2023-04-24 DIAGNOSIS — E03.8 OTHER SPECIFIED HYPOTHYROIDISM: ICD-10-CM

## 2023-04-24 DIAGNOSIS — R40.4 ALTERED SENSORIUM: ICD-10-CM

## 2023-04-24 PROBLEM — N30.00 ACUTE CYSTITIS WITHOUT HEMATURIA: Status: RESOLVED | Noted: 2022-10-05 | Resolved: 2023-04-24

## 2023-04-24 PROBLEM — I82.409 DEEP VENOUS THROMBOSIS: Status: ACTIVE | Noted: 2021-11-16

## 2023-04-24 PROBLEM — R41.0 CONFUSION: Status: RESOLVED | Noted: 2022-08-03 | Resolved: 2023-04-24

## 2023-04-24 PROBLEM — I12.9 BENIGN HYPERTENSION WITH CHRONIC KIDNEY DISEASE: Status: ACTIVE | Noted: 2022-07-26

## 2023-04-24 PROBLEM — Z85.3 HISTORY OF MALIGNANT NEOPLASM OF BREAST: Status: RESOLVED | Noted: 2021-11-16 | Resolved: 2023-04-24

## 2023-04-24 PROBLEM — Z85.3 HISTORY OF MALIGNANT NEOPLASM OF BREAST: Status: ACTIVE | Noted: 2021-11-16

## 2023-04-24 PROBLEM — M19.90 ARTHRITIS: Status: RESOLVED | Noted: 2020-03-02 | Resolved: 2023-04-24

## 2023-04-24 PROBLEM — N17.9 ACUTE KIDNEY INJURY: Status: RESOLVED | Noted: 2022-07-26 | Resolved: 2023-04-24

## 2023-04-24 PROBLEM — N17.9 ACUTE KIDNEY INJURY: Status: ACTIVE | Noted: 2022-07-26

## 2023-04-24 PROBLEM — Z86.718 HISTORY OF DVT (DEEP VEIN THROMBOSIS): Status: RESOLVED | Noted: 2021-11-16 | Resolved: 2023-04-24

## 2023-04-24 PROBLEM — D72.829 LEUKOCYTOSIS: Status: RESOLVED | Noted: 2021-03-15 | Resolved: 2023-01-01

## 2023-04-24 LAB
APPEARANCE FLD: ABNORMAL
COLOR FLD: ABNORMAL
CRYSTALS FLD MICRO: NORMAL
LYMPHOCYTES NFR FLD MANUAL: 5 %
METHOD: ABNORMAL
MONOCYTES NFR FLD: 0 %
MONOS+MACROS NFR FLD: 2 %
NEUTROPHILS NFR FLD MANUAL: 93 %
NUC CELL # FLD: ABNORMAL /MM3
RBC # FLD AUTO: ABNORMAL /MM3

## 2023-04-24 PROCEDURE — 89051 BODY FLUID CELL COUNT: CPT | Performed by: NURSE PRACTITIONER

## 2023-04-24 PROCEDURE — 87070 CULTURE OTHR SPECIMN AEROBIC: CPT | Performed by: NURSE PRACTITIONER

## 2023-04-24 PROCEDURE — 87205 SMEAR GRAM STAIN: CPT | Performed by: NURSE PRACTITIONER

## 2023-04-24 PROCEDURE — 87075 CULTR BACTERIA EXCEPT BLOOD: CPT | Performed by: NURSE PRACTITIONER

## 2023-04-24 PROCEDURE — 89060 EXAM SYNOVIAL FLUID CRYSTALS: CPT | Performed by: NURSE PRACTITIONER

## 2023-04-24 PROCEDURE — 87186 SC STD MICRODIL/AGAR DIL: CPT | Performed by: NURSE PRACTITIONER

## 2023-04-24 PROCEDURE — 87147 CULTURE TYPE IMMUNOLOGIC: CPT | Performed by: NURSE PRACTITIONER

## 2023-04-24 RX ORDER — NALOXONE HYDROCHLORIDE 4 MG/.1ML
1 SPRAY NASAL AS NEEDED
Qty: 1 EACH | Refills: 1 | Status: SHIPPED | OUTPATIENT
Start: 2023-04-24

## 2023-04-24 RX ORDER — SULFAMETHOXAZOLE AND TRIMETHOPRIM 800; 160 MG/1; MG/1
1 TABLET ORAL 2 TIMES DAILY
Qty: 20 TABLET | Refills: 0 | COMMUNITY
End: 2023-04-27 | Stop reason: SDUPTHER

## 2023-04-24 RX ORDER — RIZATRIPTAN BENZOATE 10 MG/1
10 TABLET ORAL ONCE
Qty: 12 TABLET | Refills: 5 | Status: SHIPPED | OUTPATIENT
Start: 2023-04-24 | End: 2023-04-24

## 2023-04-24 RX ORDER — ALPRAZOLAM 0.5 MG/1
TABLET ORAL
Qty: 120 TABLET | Refills: 2 | Status: SHIPPED | OUTPATIENT
Start: 2023-05-19

## 2023-04-24 NOTE — Clinical Note
Hello, I aspirated her elbow today and will send off fluid. We usually start bactrim since the Augmentin gave her partial improvements. Just wanted to double check with you if this would be ok to RX with her current chemotherapy. Thank you, Hadley SHEIKH

## 2023-04-24 NOTE — PROGRESS NOTES
[facility]  New Problem      Patient Name: Holli Patel  : 1965  Primary Care Physician: Vandana Gastelum MD        Chief Complaint:  Left Elbow    HPI:   Holli Patel is a 57 y.o. year old who presents today for evaluation of left elbow pain and swelling.  Symptoms started several months ago patient reports she had a patch of dry skin on her elbow she used a pedicure iva to take off some of the rough tissue and developed a little sore on the area.  Over time she developed some swelling and while she was doing chemotherapy treatment the nurse noticed quite a bit of redness and swelling in the area her physician took a look at it and put her on Augmentin for concern of infection.  She is referred to us now for further evaluation and treatment.  She is here with new x-rays.    Patient and her  report that the antibiotic did help with more of the significant redness it is still swollen and tender.  No fevers or chills.  She is receiving chemotherapy every 3 weeks for metastatic lung cancer.      Past Medical/Surgical, Social and Family History:  I have reviewed and/or updated pertinent history as noted in the medical record including:  Past Medical History:   Diagnosis Date   • Anxiety    • Clot     POSSIBLE BLOOD CLOT IN VENOUS ACCESS DEVICE-PT STATES WAS STARTED ON ELIQUIS    • Dyspnea on exertion    • Elevated cholesterol    • Frequent urination     DAY AND NIGHT   • SHAYY (generalized anxiety disorder)     RELATED HIGH BLOOD PRESSURE   • GERD (gastroesophageal reflux disease)    • High blood pressure     ANXIETY RELATED   • Hyperlipidemia    • Hypothyroidism    • Lung cancer    • Lung nodule     LEFT   • Malignant neoplasm of upper-outer quadrant of right breast in female, estrogen receptor positive 2021    PT STATES HAS BILAT BREAST CANCER   • Migraine    • PONV (postoperative nausea and vomiting)      Past Surgical History:   Procedure Laterality Date   • BREAST BIOPSY  Bilateral 2021    Right Breast 10 o'clock & Left breast 10 o'clock 6 cm from nipple, [facility]   • CLOSED REDUCTION HIP DISLOCATION Left 2021    Procedure: BRONCHOSCOPY, LEFT VIDEO ASSITED THORACOSCOPY, ROBOTIC ASSSITED MEDIASTINAL LYMPHADENECTOMY WITH INTERCOSTAL NERVE BLOCKS;  Surgeon: Raphael Kerr III, MD;  Location: [facility];  Service: DaVinci;  Laterality: Left;   • COLONOSCOPY     • EPIDURAL Bilateral 2022    Procedure: LUMBAR/SACRAL TRANSFORAMINAL EPIDURAL Bilateral L5-S1;  Surgeon: Breann Santiago MD;  Location: [facility] MAIN OR;  Service: Pain Management;  Laterality: Bilateral;   • EPIDURAL Right 2023    Procedure: LUMBAR/SACRAL TRANSFORAMINAL EPIDURAL Right L5-S1 67422 (Hold eliqus 3 days);  Surgeon: Breann Santiago MD;  Location: [facility] MAIN OR;  Service: Pain Management;  Laterality: Right;   • TUBAL ABDOMINAL LIGATION     • VENOUS ACCESS DEVICE (PORT) INSERTION Right 2021    Procedure: INSERTION VENOUS ACCESS DEVICE;  Surgeon: Raphael Kerr III, MD;  Location: [facility] OR;  Service: Thoracic;  Laterality: Right;   • VENOUS ACCESS DEVICE (PORT) INSERTION Right 2021    Procedure: REVISION AND REPLACEMENT RIGHT SUBCLAVIAN VENOUS ACCESS PORT;  Surgeon: Raphael Kerr III, MD;  Location: [facility];  Service: Thoracic;  Laterality: Right;   • WRIST SURGERY Right      Social History     Occupational History     Employer: [employer]   Tobacco Use   • Smoking status: Former     Packs/day: 1.00     Years: 30.00     Pack years: 30.00     Types: Electronic Cigarette, Cigarettes     Start date: 1981     Quit date:      Years since quittin.3   • Smokeless tobacco: Never   • Tobacco comments:     ABT 15 CIGARETTES DAILY   Vaping Use   • Vaping Use: Some days   • Substances: Nicotine, Flavoring   • Devices: Disposable   Substance and Sexual Activity   • Alcohol use: Never   • Drug use: Never   • Sexual activity: Yes     Partners: Male           Allergies: No Known Allergies    Medications:   Home Medications:  Current Outpatient Medications on File Prior to Visit   Medication Sig   • dexamethasone (DECADRON) 2 MG tablet Take 1 tablet by mouth Daily. Take at noon   • dexamethasone (DECADRON) 4 MG tablet Take 2 tablets oral twice a day for 3 consecutive days beginning the day before chemotherapy and continue for 6 doses.   • folic acid (FOLVITE) 1 MG tablet TAKE 1 TABLET BY MOUTH EVERY DAY   • letrozole (FEMARA) 2.5 MG tablet Take 1 tablet by mouth Daily.   • omeprazole (PrilOSEC) 20 MG capsule Take 1 capsule by mouth 2 (Two) Times a Day.   • oxyCODONE (ROXICODONE) 15 MG immediate release tablet Take 1 tablet by mouth Every 4 (Four) Hours As Needed for Moderate Pain.   • rivaroxaban (XARELTO) 20 MG tablet Take 1 tablet by mouth Daily.   • levothyroxine (SYNTHROID, LEVOTHROID) 25 MCG tablet Take 1 tablet by mouth Daily. (Patient not taking: Reported on 4/4/2023)   • ondansetron (Zofran) 8 MG tablet Take 1 tablet by mouth Every 8 (Eight) Hours As Needed for Nausea or Vomiting. (Patient not taking: Reported on 4/24/2023)   • potassium chloride (MICRO-K) 10 MEQ CR capsule TAKE 2 CAPSULES BY MOUTH EVERY DAY (Patient not taking: Reported on 4/4/2023)   • sulfamethoxazole-trimethoprim (BACTRIM DS,SEPTRA DS) 800-160 MG per tablet Take 1 tablet by mouth 2 (Two) Times a Day.   • [DISCONTINUED] levoFLOXacin (Levaquin) 500 MG tablet Take 1 tablet by mouth Daily. (Patient not taking: Reported on 4/24/2023)     No current facility-administered medications on file prior to visit.         ROS:  14 point review of systems was negative except as listed in the HPI.    Physical Exam:   57 y.o. female  Body mass index is 23.91 kg/m²., 61.2 kg (135 lb)  There were no vitals filed for this visit.  General: Alert, cooperative, appears well and in no observable distress. Appears stated age and BMI as listed above.  HEENT: Normocephalic, atraumatic on external visual  inspection.  CV: No significant peripheral edema.  Respiratory: Normal respiratory effort.  Skin: Warm & well perfused; appropriate skin turgor.  Psych: Appropriate mood & affect.  Neuro: Gross sensation and motor intact in affected extremity/extremities.  Vascular: Peripheral pulses palpable in affected extremity/extremities.     MSK Exam:  Physical examination of the left elbow reveals redness and swelling over the olecranon bursa.  She does have some surrounding soft tissue swelling.  There are photographs of the elbow previously and by comparison the redness is improved today.  There is a small scab directly over the center point of the bursa that is intact and does not appear to be draining or infected.  The area is tender to touch over the bony prominences.  Range of motion is full strength is reasonable distal extremity is unremarkable.    Radiology:    The following X-rays were ordered/reviewed today to evaluate the patient's symptoms: Elbow: 2 views of the left elbow show No obvious bony abnormalities, soft tissue swelling is evident..    Procedure:   See Procedure Note: The potential risks and benefits of performing a diagnostic and therapeutic injection were discussed with the patient prior to procedure. Risks include, but are not limited to infection, swelling, transient increase in pain, bleeding, bruising. Patient was advised that injections are a diagnostic and therapeutic tool meaning they may not alleviate symptoms at all, or may only provide partial or temporary relief. Injection precautions and aftercare discussed.     Medium Joint Arthrocentesis: L olecranon bursa  Date/Time: 4/24/2023 9:00 AM  Procedure Details  Location: elbow - L olecranon bursa  Needle size: 18 G  Approach: posterior  Aspirate amount: 8 mL  Aspirate: cloudy, blood-tinged and purulent  Analysis: fluid sample sent for laboratory analysis  Patient tolerance: patient tolerated the procedure well with no immediate  complications            Misc. Data/Labs: N/A    Assessment & Plan:    ICD-10-CM ICD-9-CM   1. Septic olecranon bursitis of left elbow  M71.122 726.33     041.9   2. Elbow pain, chronic, left  M25.522 719.42    G89.29 338.29     No orders of the defined types were placed in this encounter.    Orders Placed This Encounter   Procedures   • Medium Joint Arthrocentesis   • Body Fluid Culture - Body Fluid, Elbow, Left   • Gram Stain - No Culture - , Elbow   • Anaerobic Culture - , Elbow, Left   • XR ELBOW 2 VIEW LEFT   • Body Fluid Cell Count With Differential - Bursal Fluid, Elbow, Left   • Crystal Exam, Fluid - Synovial Fluid,   • Body fluid cell count - Bursal Fluid, Elbow, Left   • Body fluid differential - Bursal Fluid, Elbow, Left     This is a 57-year-old female with suspected septic olecranon bursitis of the left elbow.  She has made some improvement after a course of Augmentin but she still has persistent swelling and redness.  Plan is to aspirate the bursa which she tolerated well, minimal amount of fluid was obtained but sent off for laboratory studies.  I will reach out to her oncologist to make sure it would be okay to put her on some Bactrim I would start that for further treatment.    We will await the results of the fluid analysis to determine if the Bactrim is an appropriate drug.  I like to see her back in 2 weeks for reevaluation if her symptoms fail to improve we will consider additional means of testing or treatment.  Hopefully we can improve this without the need for anything surgical.  I do think her risks are higher given that she is immunocompromised however I think the aspiration will help her heal along with the appropriate antibiotics.    Strict discussion on her wound care she is advised to leave the scab intact and I would not put any Neosporin ointments on it and allow it to remain dry until it falls off on its own.  We also talked about protecting the elbow possibly using pillows or stack  of hand towels to avoid any friction on the area.    Patient was also seen with Dr. Saul dotson.    Return in about 2 weeks (around 5/8/2023) for Recheck.    Patient encouraged to call with questions or concerns prior to follow up.  Recommend ICE and/or HEAT PRN as discussed.  Will discuss with attending physician as needed.  Consider additional referrals, work up and/or advanced imaging as indicated or if patient fails to respond to conservative care.        Hadley Vora, APRN

## 2023-04-24 NOTE — PROGRESS NOTES
"Subjective   Holli Patel is a 57 y.o. female.     There were no vitals filed for this visit.     Chief Complaint   Patient presents with   • Anxiety     Xanax refills, follow-up on multiple chronic diagnoses, needs refills, and complains of overactive bladder        History of Present Illness    4-month follow-up on chronic SHAYY, mental status changes, metastatic lung cancer to both bone and brain (new diagnosis,) migraines, hypothyroidism, and complaints of \"an overactive bladder.\"  This is a video visit, her /Jelani is present to help assist with connections and medication decisions.    LOV with me in December for hospital follow-up due to encephalopathy, poorly controlled cancer pain, and acute mental status changes.  She was not in very good shape at last office visit (see last note for details.)  Still experiencing significant mental status changes and poorly controlled pain.  Many things were done at last visit, including discussions with her oncologist.  -- Several medications were discontinued, including Zocor and Cymbalta.  -- Many manipulations were done with her chronic pain meds by her oncologist.  Ultimately ended up discontinuing long-acting narcotics (last tried on Duragesic patch and OxyContin.)  Very sensitive to pain medications.  There was concern for leptomeningeal disease at that time, although all scans were showing improvement with chemo treatment.  Currently, she is only on immediate release oxycodone, approximately 4-6 times per day.  This is managed by her oncologist.  -- Xanax 0.5 mg was increased from twice daily dosing to 4 times daily dosing  -- Labs were all stable.  Urine culture negative.  Blood culture negative.  Imaging studies were all stable and/or showing positive response to treatment.    Other interval history since last seen by me --still receiving chemo every 3 weeks/Taxol with routine labs prior to each treatment.  --January 2023 MRI of brain showed 9 new lesions " "(2 to 6 mm in size.)  --Diagnosed with a left soleal DVT in March 2023, status post loading dose of Xarelto and now on maintenance dose.  Hypercoagulable work-up done.  --Last month she had both a restaging PET/CT scan and MRI of her brain done on 3/29/2023, records reviewed.  --PET/CT scan was consistent with significant improvement in the mediastinal lymphadenopathy, still some groundglass opacities and evidence of esophagitis  -- MRI of brain showed a slight decrease in the size of the previously seen 9 new lesions, some possible concern for leptomeningeal seeding, stable tiny infarct    Overall, doing so much better since last visit here.  No longer with any mental status changes or confusion, seems back to her normal state of mind.  Still receiving chemotherapy every 3 weeks, planned for tomorrow, will have labs done tomorrow.  Appetite is good.  Weight gain noted (although on Decadron.)  Mood is good.  Sleeping fine.  Pain seems to be more adequately controlled.    Her only new complaint today is \"an overactive bladder.\"  For the last several weeks she feels like she constantly has to go to the bathroom when laying down.  Does not seem to be an issue when sitting upright.  However when laying down often gets the urgency that she has to go but only seems to have a trickle of urine, incomplete emptying?  No incontinence.  She did have a urinalysis done about 3 weeks ago which was completely negative.  Recently treated with Levaquin for some possible cholecystitis.    Otherwise, needs chronic med refills.  She has been compliant with and tolerating current medications without any side effects or changes in mental status.  Currently on Xanax 0.5 mg a quantity of 4/day for chronic SHAYY that was exacerbated by recent cancer diagnoses etc.  Of note, she has not been on her Synthroid in several months.  Last TSH was in November and was normal, uncertain if she was taking Synthroid at that time?  She does not recall why " this was discontinued but believes she just was forgetting to take it and thus has not been refilled.      The following portions of the patient's history were reviewed and updated as appropriate: allergies, current medications, past family history, past medical history, past social history, past surgical history and problem list.    Review of Systems   Constitutional: Positive for unexpected weight gain.   Respiratory: Negative for shortness of breath.    Cardiovascular: Negative for chest pain.       Objective   Physical Exam  Vitals and nursing note reviewed.   Constitutional:       General: She is not in acute distress.     Appearance: She is well-developed.      Comments: Steroid-induced facies   HENT:      Head: Normocephalic and atraumatic.   Pulmonary:      Effort: Pulmonary effort is normal.   Neurological:      Mental Status: She is alert and oriented to person, place, and time.   Psychiatric:         Mood and Affect: Mood normal.         Behavior: Behavior normal.         Thought Content: Thought content normal.         Judgment: Judgment normal.          LABS/STUDIES  Common labs        3/21/2023    12:27 4/4/2023    09:25 4/13/2023    09:17   Common Labs   Glucose  136      BUN  19      Creatinine  1.17      Sodium  141      Potassium  3.7      Chloride  106      Calcium  8.6      Albumin  3.5      Total Bilirubin  <0.2      Alkaline Phosphatase  106      AST (SGOT)  13      ALT (SGPT)  9      WBC 14.66   15.99   26.99     Hemoglobin 8.4   8.1   8.6     Hematocrit 27.5   27.3   28.7     Platelets 112   128   196            Lab Results   Component Value Date    FERRITIN 189.70 03/14/2023    BDZD72AQ 6.5 (L) 08/06/2022    FOLATE >20.00 03/14/2023      Urinalysis With Microscopic - (04/04/2023 09:58)    Procedures     Assessment & Plan   Diagnoses and all orders for this visit:    1. Anxiety (Primary)  -controlled  Doing much better emotionally, mentally, mental status etc.  Urine Tox screen/December 2022  and [facility] report both reviewed, appropriate.  Plan to refill Xanax as directed below, next refill due 5/19/2023  -     ALPRAZolam (XANAX) 0.5 MG tablet; 1 BY MOUTH EVERY 6 HOURS, CHANGE IN QUANTITY, METASTATIC CANCER  Dispense: 120 tablet; Refill: 2    2. 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  Being managed by oncologist.  Plan to send prescription for Narcan in case of accidental overdose, on both benzo and narcotic    3. Metastasis to brain  -new Dx  Appears to be responding to chemo.  Mental status clear    4. Altered sensorium  -resolved, much improved  S/P significant mental status changes at last visit, December 2022.  S/P hospitalization  Secondary to sensitivity to pain medications, possible early leptomeningeal disease and early brain metastases?  Currently doing much better, essentially back to her normal mental status state.    5. Other specified hypothyroidism  -controlled?  Plan to recheck TSH, has been off Synthroid x6 months  -     TSH; Future    6. History of migraine  -controlled, refill Maxalt    7. OAB (overactive bladder)  -new diagnosis  May start Myrbetriq 25 mg daily, may increase to 50 mg if needed  If not covered, may need to change to generic oxybutynin  If symptoms are worsened, urinary retention, then to ER as discussed earlier.    8. High risk medication use    Other orders  -     rizatriptan (MAXALT) 10 MG tablet; Take 1 tablet by mouth 1 (One) Time for 1 dose. AT ONSET OF HEADACHE MAY REPEAT ONE TABLET IN 2 HOURS IF NEEDED  Dispense: 12 tablet; Refill: 5  -     Mirabegron ER (MYRBETRIQ) 25 MG tablet sustained-release 24 hour 24 hr tablet; Take 1 tablet by mouth Daily.  Dispense: 30 tablet; Refill: 5  -     naloxone (Narcan) 4 MG/0.1ML nasal spray; 1 spray into the nostril(s) as directed by provider As Needed (high dose mme or any narcotic with benzo).  Dispense: 1 each; Refill: 1      This was a telemedicine  visit --- Nette sotomayor  Patient present at her home/residence, myself at [facility] office [id number].  Video visit time--- 40 minutes--- this included reviewing prior visit, specialist/oncologist notes and recent imaging studies, ordering labs and medications, care coordination, medication reconciliation list, extensive counseling, education and management of chronic diagnoses along with new diagnosis.  Also documentation.           Return in about 3 months (around 7/24/2023) for Recheck.     This was an audio and video enabled telemedicine encounter.

## 2023-04-25 ENCOUNTER — INFUSION (OUTPATIENT)
Dept: ONCOLOGY | Facility: HOSPITAL | Age: 58
End: 2023-04-25
Payer: COMMERCIAL

## 2023-04-25 ENCOUNTER — OFFICE VISIT (OUTPATIENT)
Dept: ONCOLOGY | Facility: CLINIC | Age: 58
End: 2023-04-25
Payer: COMMERCIAL

## 2023-04-25 VITALS
HEIGHT: 69 IN | RESPIRATION RATE: 18 BRPM | WEIGHT: 142.3 LBS | BODY MASS INDEX: 21.08 KG/M2 | TEMPERATURE: 96.6 F | OXYGEN SATURATION: 98 % | DIASTOLIC BLOOD PRESSURE: 83 MMHG | HEART RATE: 96 BPM | SYSTOLIC BLOOD PRESSURE: 119 MMHG

## 2023-04-25 DIAGNOSIS — Z79.899 HIGH RISK MEDICATION USE: ICD-10-CM

## 2023-04-25 DIAGNOSIS — G89.3 CANCER RELATED PAIN: ICD-10-CM

## 2023-04-25 DIAGNOSIS — C79.51 NON-SMALL CELL LUNG CANCER METASTATIC TO BONE: Primary | ICD-10-CM

## 2023-04-25 DIAGNOSIS — C34.90 NON-SMALL CELL LUNG CANCER METASTATIC TO BONE: Primary | ICD-10-CM

## 2023-04-25 DIAGNOSIS — C34.90 NON-SMALL CELL LUNG CANCER METASTATIC TO BONE: ICD-10-CM

## 2023-04-25 DIAGNOSIS — C79.51 NON-SMALL CELL LUNG CANCER METASTATIC TO BONE: ICD-10-CM

## 2023-04-25 DIAGNOSIS — C50.411 MALIGNANT NEOPLASM OF UPPER-OUTER QUADRANT OF RIGHT BREAST IN FEMALE, ESTROGEN RECEPTOR POSITIVE: Primary | ICD-10-CM

## 2023-04-25 DIAGNOSIS — Z17.0 MALIGNANT NEOPLASM OF UPPER-OUTER QUADRANT OF RIGHT BREAST IN FEMALE, ESTROGEN RECEPTOR POSITIVE: Primary | ICD-10-CM

## 2023-04-25 DIAGNOSIS — Z45.2 ENCOUNTER FOR FITTING AND ADJUSTMENT OF VASCULAR CATHETER: ICD-10-CM

## 2023-04-25 DIAGNOSIS — C79.70 NON-SMALL CELL LUNG CANCER METASTATIC TO ADRENAL GLAND: ICD-10-CM

## 2023-04-25 DIAGNOSIS — C34.90 NON-SMALL CELL LUNG CANCER METASTATIC TO ADRENAL GLAND: ICD-10-CM

## 2023-04-25 LAB
ALBUMIN SERPL-MCNC: 3.6 G/DL (ref 3.5–5.2)
ALBUMIN/GLOB SERPL: 1.4 G/DL (ref 1.1–2.4)
ALP SERPL-CCNC: 100 U/L (ref 38–116)
ALT SERPL W P-5'-P-CCNC: 16 U/L (ref 0–33)
ANION GAP SERPL CALCULATED.3IONS-SCNC: 13.8 MMOL/L (ref 5–15)
AST SERPL-CCNC: 12 U/L (ref 0–32)
BASOPHILS # BLD AUTO: 0.03 10*3/MM3 (ref 0–0.2)
BASOPHILS NFR BLD AUTO: 0.2 % (ref 0–1.5)
BILIRUB SERPL-MCNC: <0.2 MG/DL (ref 0.2–1.2)
BUN SERPL-MCNC: 26 MG/DL (ref 6–20)
BUN/CREAT SERPL: 23.2 (ref 7.3–30)
CALCIUM SPEC-SCNC: 8.9 MG/DL (ref 8.5–10.2)
CHLORIDE SERPL-SCNC: 104 MMOL/L (ref 98–107)
CO2 SERPL-SCNC: 24.2 MMOL/L (ref 22–29)
CREAT SERPL-MCNC: 1.12 MG/DL (ref 0.6–1.1)
DEPRECATED RDW RBC AUTO: 64.2 FL (ref 37–54)
EGFRCR SERPLBLD CKD-EPI 2021: 56.8 ML/MIN/1.73
EOSINOPHIL # BLD AUTO: 0.02 10*3/MM3 (ref 0–0.4)
EOSINOPHIL NFR BLD AUTO: 0.1 % (ref 0.3–6.2)
ERYTHROCYTE [DISTWIDTH] IN BLOOD BY AUTOMATED COUNT: 17.2 % (ref 12.3–15.4)
GLOBULIN UR ELPH-MCNC: 2.5 GM/DL (ref 1.8–3.5)
GLUCOSE SERPL-MCNC: 126 MG/DL (ref 74–124)
HCT VFR BLD AUTO: 28.3 % (ref 34–46.6)
HGB BLD-MCNC: 8.3 G/DL (ref 12–15.9)
IMM GRANULOCYTES # BLD AUTO: 0.3 10*3/MM3 (ref 0–0.05)
IMM GRANULOCYTES NFR BLD AUTO: 1.7 % (ref 0–0.5)
LYMPHOCYTES # BLD AUTO: 0.27 10*3/MM3 (ref 0.7–3.1)
LYMPHOCYTES NFR BLD AUTO: 1.6 % (ref 19.6–45.3)
MCH RBC QN AUTO: 30.1 PG (ref 26.6–33)
MCHC RBC AUTO-ENTMCNC: 29.3 G/DL (ref 31.5–35.7)
MCV RBC AUTO: 102.5 FL (ref 79–97)
MONOCYTES # BLD AUTO: 0.38 10*3/MM3 (ref 0.1–0.9)
MONOCYTES NFR BLD AUTO: 2.2 % (ref 5–12)
NEUTROPHILS NFR BLD AUTO: 16.39 10*3/MM3 (ref 1.7–7)
NEUTROPHILS NFR BLD AUTO: 94.2 % (ref 42.7–76)
NRBC BLD AUTO-RTO: 0 /100 WBC (ref 0–0.2)
PLATELET # BLD AUTO: 146 10*3/MM3 (ref 140–450)
PMV BLD AUTO: 9.4 FL (ref 6–12)
POTASSIUM SERPL-SCNC: 4.3 MMOL/L (ref 3.5–4.7)
PROT SERPL-MCNC: 6.1 G/DL (ref 6.3–8)
RBC # BLD AUTO: 2.76 10*6/MM3 (ref 3.77–5.28)
SODIUM SERPL-SCNC: 142 MMOL/L (ref 134–145)
WBC NRBC COR # BLD: 17.39 10*3/MM3 (ref 3.4–10.8)

## 2023-04-25 PROCEDURE — 25010000002 DIPHENHYDRAMINE PER 50 MG: Performed by: NURSE PRACTITIONER

## 2023-04-25 PROCEDURE — 25010000002 GRANISETRON PER 100 MCG: Performed by: NURSE PRACTITIONER

## 2023-04-25 PROCEDURE — 85025 COMPLETE CBC W/AUTO DIFF WBC: CPT

## 2023-04-25 PROCEDURE — 80053 COMPREHEN METABOLIC PANEL: CPT

## 2023-04-25 PROCEDURE — 96375 TX/PRO/DX INJ NEW DRUG ADDON: CPT

## 2023-04-25 PROCEDURE — 25010000002 HEPARIN LOCK FLUSH PER 10 UNITS: Performed by: INTERNAL MEDICINE

## 2023-04-25 PROCEDURE — 25010000002 DOCETAXEL 10 MG/ML SOLUTION 16 ML VIAL: Performed by: NURSE PRACTITIONER

## 2023-04-25 PROCEDURE — 96413 CHEMO IV INFUSION 1 HR: CPT

## 2023-04-25 RX ORDER — FAMOTIDINE 10 MG/ML
20 INJECTION, SOLUTION INTRAVENOUS AS NEEDED
Status: CANCELLED | OUTPATIENT
Start: 2023-04-25

## 2023-04-25 RX ORDER — DIPHENHYDRAMINE HYDROCHLORIDE 50 MG/ML
50 INJECTION INTRAMUSCULAR; INTRAVENOUS AS NEEDED
Status: CANCELLED | OUTPATIENT
Start: 2023-04-25

## 2023-04-25 RX ORDER — GRANISETRON HYDROCHLORIDE 1 MG/ML
1 INJECTION INTRAVENOUS ONCE
Status: COMPLETED | OUTPATIENT
Start: 2023-04-25 | End: 2023-04-25

## 2023-04-25 RX ORDER — SODIUM CHLORIDE 0.9 % (FLUSH) 0.9 %
10 SYRINGE (ML) INJECTION AS NEEDED
Status: DISCONTINUED | OUTPATIENT
Start: 2023-04-25 | End: 2023-04-25 | Stop reason: HOSPADM

## 2023-04-25 RX ORDER — GRANISETRON HYDROCHLORIDE 1 MG/ML
1 INJECTION INTRAVENOUS ONCE
Status: CANCELLED | OUTPATIENT
Start: 2023-04-25 | End: 2023-04-25

## 2023-04-25 RX ORDER — SODIUM CHLORIDE 9 MG/ML
250 INJECTION, SOLUTION INTRAVENOUS ONCE
Status: CANCELLED | OUTPATIENT
Start: 2023-04-25

## 2023-04-25 RX ORDER — FAMOTIDINE 10 MG/ML
20 INJECTION, SOLUTION INTRAVENOUS ONCE
Status: COMPLETED | OUTPATIENT
Start: 2023-04-25 | End: 2023-04-25

## 2023-04-25 RX ORDER — HEPARIN SODIUM (PORCINE) LOCK FLUSH IV SOLN 100 UNIT/ML 100 UNIT/ML
500 SOLUTION INTRAVENOUS AS NEEDED
Status: DISCONTINUED | OUTPATIENT
Start: 2023-04-25 | End: 2023-04-25 | Stop reason: HOSPADM

## 2023-04-25 RX ORDER — SODIUM CHLORIDE 9 MG/ML
250 INJECTION, SOLUTION INTRAVENOUS ONCE
Status: COMPLETED | OUTPATIENT
Start: 2023-04-25 | End: 2023-04-25

## 2023-04-25 RX ORDER — FAMOTIDINE 10 MG/ML
20 INJECTION, SOLUTION INTRAVENOUS ONCE
Status: CANCELLED | OUTPATIENT
Start: 2023-04-25

## 2023-04-25 RX ORDER — HEPARIN SODIUM (PORCINE) LOCK FLUSH IV SOLN 100 UNIT/ML 100 UNIT/ML
500 SOLUTION INTRAVENOUS AS NEEDED
Status: CANCELLED | OUTPATIENT
Start: 2023-04-25

## 2023-04-25 RX ORDER — SODIUM CHLORIDE 0.9 % (FLUSH) 0.9 %
10 SYRINGE (ML) INJECTION AS NEEDED
Status: CANCELLED | OUTPATIENT
Start: 2023-04-25

## 2023-04-25 RX ADMIN — Medication 500 UNITS: at 13:08

## 2023-04-25 RX ADMIN — GRANISETRON HYDROCHLORIDE 1 MG: 1 INJECTION, SOLUTION INTRAVENOUS at 11:19

## 2023-04-25 RX ADMIN — FAMOTIDINE 20 MG: 10 INJECTION INTRAVENOUS at 11:19

## 2023-04-25 RX ADMIN — DIPHENHYDRAMINE HYDROCHLORIDE 25 MG: 50 INJECTION, SOLUTION INTRAMUSCULAR; INTRAVENOUS at 11:19

## 2023-04-25 RX ADMIN — Medication 10 ML: at 13:08

## 2023-04-25 RX ADMIN — SODIUM CHLORIDE 250 ML: 9 INJECTION, SOLUTION INTRAVENOUS at 11:19

## 2023-04-25 RX ADMIN — SODIUM CHLORIDE 95 MG: 9 INJECTION, SOLUTION INTRAVENOUS at 12:08

## 2023-04-25 NOTE — PROGRESS NOTES
[facility] GROUP OUTPATIENT PROGRESS NOTE      CHIEF COMPLAINT/REASON FOR VISIT:  Metastatic non-small cell lung cancer  Bilateral breast cancer    Interval history  Patient is seen back today in follow-up due for cycle 8 Taxotere.  Most recent imaging showed that she is responding we are therefore continuing on current therapy.  Neuropathy remains stable.    She also continues on Xarelto for recently diagnosed DVT.  Denies any bleeding difficulty.    Patient saw Dr. Breann Santiago back in February for steroid injection into L5-S1, noting some improvement in her lower extremity pain.  Her  states that she is approved for up to 4 of these a year and they are wondering if it might be time for her to go back.  We discussed its been almost 3 months and that would be reasonable.    They deny other concerns at this time.    Oncologic history  Ms. 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     3/1/2021-bilateral screening mammogram-microcalcifications seen in the posterior one third of the lateral aspect of the right breast.  Area of focal asymmetry seen in the middle one third of the upper inner quadrant of the left breast.     3/1/2021-DEXA scan-T score of -2.2 on the lumbar spine and T score of -2.3 in the left hip and T score of -1.9 in the right hip.  Findings consistent with osteopenia     3/23/2021-screening lung CT-there is a 10 x 11 mm solid nodule in the left upper lobe.  Enlarged AP window lymph node measuring 14 x 10 mm.  Suggestion of a possible 9 mm left hilar lymph node.  Heavy coronary artery calcification.     3/23/2021-diagnostic mammogram bilateral-cluster of microcalcifications in the middle third lateral aspect of the right breast.  Left breast demonstrates persistence of the area of focal asymmetry in the region.     Ultrasound-left breast ultrasound at 10 o'clock position, 6 cm from the nipple there  is a 0.4 cm irregular hypoechoic lesion.  Stereotactic biopsy of the right breast calcifications recommended.  Ultrasound-guided biopsy of the left breast lesion recommended     4/7/2021-right breast stereotactic biopsy and left breast ultrasound-guided biopsy  Pathology  Right breast-invasive ductal carcinoma, grade 2, lymphovascular invasion present, ER +99% strong, IL +80% moderate, HER-2 negative, Ki-67 12%  Left breast upper inner quadrant 10 o'clock position biopsy, invasive ductal carcinoma, grade   2, ER +99% strong, IL +40% strong, HER-2 2+ on immunohistochemistry, nonamplified on FISH Ki-67 10%     4/14/2021-PET/CT-FDG avid aortopulmonary window lymph node measures 0.9 cm with an SUV of 7.5.  FDG avid left hilar lymph node measures 1 cm with an SUV of 17.2.  Irregular soft tissue density in the right breast measuring 3.7 x 3.8 cm is favored to be secondary to the recent breast biopsy.  1.1 cm pulmonary nodule within the left upper lobe with SUV 9.7 .  Sub-6 mm pulmonary nodules are present in the right lower lobe.  No evidence of lymphadenopathy or metastatic disease in the abdomen.  Focal area of FDG uptake within the central canal posterior to T11-T12 displaced demonstrating an SUV of 5.5 thought to be reactive however MRI is recommended for further evaluation.     She was seen by Dr. Molina who initially planned for breast surgery however  due to the PET abnormalities she has been referred to Dr. Kerr who plans to do a wound on 4/30/2021.  Dr. Molina's and Dr. Kerr's notes reviewed.     Patient denies any family history of breast cancer.  Her mother had lymphoma.  Maternal grandmother had colon cancer.  Prior to that screening mammogram she did not have any palpable abnormalities of either breast.     She has been a heavy smoker for about 40 years and smoked 2 packs/day.  Denies any recent weight changes, new bone pains, cough, abdominal pain nausea vomiting constipation or diarrhea.  She does  have anxiety and has been on several medications.  She has recently been started on Xanax to help with the mood and also with insomnia.     Patient had a video-assisted thoracoscopy and mediastinal lymphadenectomy on 4/30/2021.  L5-L6 lymph nodes were biopsied however the small pulmonary nodule in the left upper lobe was not difficult to find.  Pathology showed moderately differentiated adenocarcinoma which is CK7 and TTF-1 positive.  Consistent with lung primary.  Ki-67 85%.     5/14/2021-MRI of the brain-minimal chronic small vessel ischemic change in the white matter.  Otherwise negative MRI.     5/20/2021-bilateral axillary ultrasound-no evidence of axillary lymphadenopathy in either axilla.  Normal-appearing bilateral axillary lymph nodes visualized.     Cycle 1 cisplatin and Alimta on 5/25/2021.     Cycle 2 cisplatin Alimta 6/15/2021     Cycle 3 cisplatin Alimta 7/7/2021     Completed radiation on 7/12/2021     Port was nonfunctional hence a port study was performed which showed that the port was backed up into the innominate vein and also there was a nonocclusive thrombus at the end of the catheter extending into the superior vena cava.  She was started on anticoagulation with Eliquis.  It was recommended for port revision of the intention to keep the port.     PET/CT 8/5/2021-images independently reviewed and interpreted by me-decrease in size and FDG uptake of the left upper lobe pulmonary nodule as well as mediastinal and left hilar lymphadenopathy representing response to treatment.  Sub-6 mm pulmonary nodule in the left upper lobe new since 4/14/2021.  This could be related to radiation however follow-up CT in 3 months recommended.  Decrease in size of the irregular masslike tissue in the right breast which is postbiopsy hematoma.  This is not PET avid.  New segmental left eighth rib fractures healing.  A new band of sclerosis of the left seventh rib which favored to represent healing nondisplaced  fracture.  Follow-up CT recommended.  focal uptake in the duodenum first and second portions.  Could be related to duodenitis.  Indeterminate lesion in the L1 vertebral body not well evaluated on PET/CT.     10/1/2021-MRI of the lumbar spine.  Lesion in the left posterior body of L1 measures 14 x 14 x 12 mm and previously 5 x 5 x 6 mm.  The interval enlargement strongly suggest that it is metastatic lesion.  No additional osseous metastasis noted.   Other chronic changes noted.     10/14/2021-biopsy of the lumbar spine lesion-pathology consistent with poorly differentiated carcinoma.  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     10/26/2021-brain MRI-no evidence of metastatic disease.     10/26/2021-bilateral diagnostic mammogram and ultrasound  Scattered fibroglandular density in both breast.  Postbiopsy hematoma with internal biopsy clip in the upper outer quadrant of the right breast, 8 cm from the nipple.  The hematoma size is decreased to 2.9 cm from 3.6 cm earlier.     Left breast-parenchymal density measuring 9 x 10 mm has markedly decreased.  Few adjacent calcifications which are unchanged.  No new abnormality in the left breast.  At 10:00 region there is some minimal adjacent hypoechoic texture which is difficult to measure but appears smaller since the previous exam.     10/28/2021-PET/CT  New L1 and L2 lytic bone metastasis annually intensely hypermetabolic focus at the left adrenal gland likely represents metastatic disease as well.  Slight decrease in size of the 0.9 x 0.7 cm left upper lobe pulmonary nodule.  There is hypermetabolic activity related to radiation pneumonitis.  The remainder of the nodule is photopenic.  Uncertain etiology of the more intense activity along the right lateral peripheral margin of the 2.6 cm postbiopsy density of the right breast.     She completed radiation to to the lumbar spine on  11/19/2021 11/22/2021-cycle 1 Alimta and Keytruda     3/23/2022-bilateral diagnostic mammogram and ultrasound  Complete resolution of the microcalcifications in the upper outer quadrant of the right breast and decrease in size of the postbiopsy hematoma.  No suspicious abnormalities in the right breast.  Benign-appearing calcifications at the site of malignancy in the left breast upper inner quadrant.  No other suspicious findings.     4/11/2022 PET/CT-there is new groundglass opacities in the left upper lobe which are most likely postradiation changes.  Other groundglass opacities in the left lung as well as the right lung remained stable.  Increased nodularity of the left adrenal gland with an SUV of 5.6, previously 3.5.    Increased uptake in the L1 vertebra in the right posterior aspect with an SUV of 3.6.  Left L1 sclerosis increased     Due to this the case was discussed in multidisciplinary conference.  The disease progression was significant in the left adrenal gland as well as the L1 vertebra.  Since there were only 2 areas of disease progression it has been decided to proceed with radiation to these 2 spots and continue on Keytruda and Alimta.     Patient also has had worsening of her kidney function.  We initially held treatment as of 5/31/2022 and creatinine bumped up to 2.0, BUN 23.  We then resumed therapy 6/11/2022 with Keytruda alone.  Patient was referred to nephrology.     6/6/2022-MRI of the spine-diffuse marrow replacement in the L1 vertebral body and inferior endplate concavity.  Suspicious for metastatic disease with pathological fracture in the inferior endplate.  Also diffuse signal abnormality in the L2 lamina on the left suspicious for additional metastatic disease.  Abnormality of the first sacral segment suspicious for metastatic disease.  Left adrenal gland appears enlarged.  30 to 40% height loss and L2 vertebral body suggestive of a subacute compression fracture.  Degenerative  changes.     7/18/2022-PET/CT  Multiple hypermetabolic osseous lesions with index lesions which have either increased in degree of FDG uptake or newly hypermetabolic lesions representative of metastatic disease and progression of disease.  Possible FDG avid lesion in the left femoral diaphysis however these are incompletely visualized and in the area of artifactual FDG uptake.  MRI of the left femur recommended for further evaluation.  Increasing FDG uptake of the left adrenal gland.  Focal left hilar hypermetabolic him corresponds to the lymph node which has minimally increased in size compared to April 2022.  New sub-6 mm pulmonary nodules.     7/26/2022-cycle 1 of Taxotere     Patient was admitted to the hospital 8/3/2022 discharged on 8/9/2022.  She was admitted for strokelike symptoms and confusion.  The confusion was thought to be secondary to polypharmacy and probably febrile neutropenia.  She was treated with antibiotics.  Mental status improved subsequently.  MRI of the cervical thoracic and lumbar spine was performed on 8/4/2022.  Images independently reviewed by me.  Multiple metastatic lesions in the spine noted.  There was a lesion on the CT which was concerning.  There is also a sacral Lesion measuring up to 5 cm in transverse dimension on the right.  Patient was symptomatic with pain on the right side of the sacrum.  Radiation oncology recommended radiation to the cervical spine as well as sacrum.  CT head without any obvious abnormalities.     Completed radiation to the cervical spine and sacrum on 8/12/2022    She was admitted to the hospital between 9/23/2022 to 10/12/2022.  She was admitted for poorly controlled pain as well as encephalopathy and multiple falls.  She was diagnosed with a UTI and treated with IV Rocephin.  Pain medications changed and also additional radiation to the right sacrum was performed.  3000 cGy in 10 fractions was administered.  She was discharged on oxycodone 15 mg every  4 hours as needed for pain and OxyContin twice daily.  She was recommended to go to a subacute rehab however she preferred to be discharged home with home physical therapy and Occupational Therapy    9/24/2022-MRI of the pelvis showed osseous metastatic disease in the sacrum worst within the right and probable pathological fractures bilaterally.    10/4/2022 CT head without any evidence of metastatic disease or acute abnormalities.    10/25/2022-PET/CT-resolution of hypermetabolic activity in the upper lobe and left hilum.  Previously noted new tiny pulmonary nodules have resolved.  Resolution of hypermetabolic activity in the left adrenal gland.  Near complete resolution of hypermetabolic activity in the skeletal metastasis particularly the cervical spine, pelvic bones and proximal left femur.  Maximal SUV at the right sacral metastasis is 2.7 and previously it was 8.9.  Tiny focus of hypermetabolic activity at the GE junction with a maximal SUV of 4.7.  No definite thickening or abnormality noted on the CT.    11/15/2022-lumbar puncture with negative cytology of the CSF.    11/21/2022-MRI of the brain with contrast and MRI of the cervical spine with contrast without any evidence of metastatic disease.    1/12/2023-PET/CT  Hypermetabolic mediastinal lymphadenopathy and multiple liver lesions which are hypermetabolic suggestive of metastasis.  New tiny left pleural effusion is noted.  Progression of bilateral sacral fractures and right L5 transverse process fracture.  There is also an acute/subacute L1 fracture.  Lumbar spine x-rays have been recommended for evaluation of the vertebral height or lumbar MRI to be compared to the priors.    1/23/2023-MRI of the brain  At least 9 separate tiny 2 to 6 mm enhancing lesions in the brain compatible with multiple new brain metastasis.  There has been interval development of 3 separate foci of encephalomalacia concerning for chronic infarcts but these are new since August  "2022.  Stable osseous metastasis at C2.    3/27/2023-PET/CT  Significant interval improvement of the previously seen hypermetabolic mediastinal lymphadenopathy as well as hepatic lesions.  Focal area of uptake noted in the gallbladder fundus raising concern for cholecystitis.  0.6 cm pulmonary nodule in the left upper lobe is new and follow-up CT in 3 months recommended.  New bilateral groundglass opacities in lower lobes suggestive of atypical pneumonia.  Long segment mild to moderate uptake within the esophagus suggestive of esophagitis.    3/28/2023-MRI of the brain  There are at least 9 separate 2 to 6 mm enhancing lesions in the superficial cortex of the brain which have shown a slight interval decrease in size and also decreased enhancement noted.  No new enhancing lesions noted.  Concern for focal areas of leptomeningeal seeding.  Unchanged from previous MRI tiny old lacunar infarcts in the left MCA territory.    Vitals:    04/25/23 1057   BP: 119/83   Pulse: 96   Resp: 18   Temp: 96.6 °F (35.9 °C)   TempSrc: Temporal   SpO2: 98%   Weight: 64.5 kg (142 lb 4.8 oz)   Height: 175.3 cm (69.02\")   PainSc:   5   PainLoc: Foot  Comment: both and legs         PHYSICAL EXAMINATION:    General: Oriented to person, place, and time.  Ill-appearing.  Pale.  HEENT: EOMI.  No bruising/edema noted.  Chest/Lungs: Clear to auscultation bilaterally. Right chest mediport in place  Heart: RRR, sinus tachycardia  Extremities: Bilateral lower extremity without any swelling.  Decreased strength in both lower extremities.  Left lower extremity with scant redness to upper foot  No focal deficits.    I have reexamined the patient and the results are consistent with the previously documented exam. Marly Calhoun, APRN     DIAGNOSTIC DATA:  Results from last 7 days   Lab Units 04/25/23  1036   WBC 10*3/mm3 17.39*   NEUTROS ABS 10*3/mm3 16.39*   HEMOGLOBIN g/dL 8.3*   HEMATOCRIT % 28.3*   PLATELETS 10*3/mm3 146     Results from " last 7 days   Lab Units 04/25/23  1036   SODIUM mmol/L 142   POTASSIUM mmol/L 4.3   CHLORIDE mmol/L 104   CO2 mmol/L 24.2   BUN mg/dL 26*   CREATININE mg/dL 1.12*   CALCIUM mg/dL 8.9   ALBUMIN g/dL 3.6   BILIRUBIN mg/dL <0.2*   ALK PHOS U/L 100   ALT (SGPT) U/L 16   AST (SGOT) U/L 12   GLUCOSE mg/dL 126*                 IMAGING:    Duplex Venous Lower Extremity - Left CAR (03/21/2023 10:25)    PET/CT 1/12/2023 and MRI of the brain 1/23/2023-images independently reviewed and interpreted by me.    ASSESSMENT:  This is a 57 y.o. female with:     *Metastatic non-small cell lung cancer  · She was initially diagnosed with stage III (T1N2M0) disease.    · She received initial therapy with cisplatin and Alimta concurrent with radiation therapy beginning 5/25/2021.    · There were indeterminate findings in the lumbar spine at L1 and it was recommended to monitor this over time.    · Patient completed 3 cycles cisplatin and Alimta 7/7/2021 and completed radiation therapy 7/12/2021.    · PET scan 8/6/2021 showed good response to treatment.    · MRI lumbar spine 10/1/2021 with increase in size of the L1 lesion.    · Biopsy of the L1 lesion on 10/14/2022 confirmed metastatic adenocarcinoma of lung origin, PD-L1 TPS 0.    · PET scan 10/26/2021 with involvement of left adrenal and L1/L2 only.    · Patient received radiation therapy to L1/L2 on 11/19/2021.  She also received radiation to the left adrenal gland.    · She initiated further systemic therapy on 11/22/2021 with Keytruda and Alimta.   · Follow-up PET scan 1/18/2022 with decrease in small left upper lobe pulmonary nodule, resolution of activity at L1/L2, no new sites of disease.    · Guardant 360 CT DNA level was monitored and was decreasing.    · PET scan 4/11/2022 with progression in left adrenal and L1.    · Maintained systemic therapy with Keytruda and Alimta and received additional radiation to left adrenal and L1.  Alimta however held since 5/10/2022 due to renal  dysfunction.  Radiation completed to lumbar spine 7/14/2022.    · PET scan 7/18/2022 with multiple areas of progression of the spine and right sacrum.    · Guardant 360 analysis 7/20/2022 with no actionable mutations.    · Change in therapy to single agent palliative Taxotere initiated 7/26/2022.    · Altered mental status and febrile neutropenia requiring hospitalization 8/2 - 8/9/2022.  During that admission, MRI brain, cervical, thoracic, lumbar spine performed 8/4/2022 in addition to CT cervical spine 8/5/2022.  There was evidence of C2 metastatic lesion with some dural enhancement, compression deformity at T7 with mild edema suggesting acute to subacute fracture, 5 mm T12 spinous process metastasis, multifocal disease in the lumbar spine and sacrum, largest involving sacral ala to the right 5 cm in transverse dimension.  Loss of height at L2 25%.  · Patient received cycle 3 Taxotere 9/6/2022 with Neulasta support.    · She has been continuing as well on Xgeva every 4 weeks (last received 9/6/2022).  · Patient did undergo MRI lumbar spine and pelvis on 9/24/2022.  MRI pelvis showed bilateral sacral fractures with marrow infiltration related to metastasis more prominent on the right.  Lesion on the right 3.5 x 4.1 x 4.3 cm.  Also probable metastasis along posterior acetabulum on the right 1.5 cm.  MRI lumbar spine with stable L1 metastasis, new edema endplate T12 possibly stress reaction versus developing new metastasis, lesion at T12 spinous process unchanged.   · Radiation oncology consulted with plans for palliative treatment of the right sacral metastasis.  Treatment initiated 9/28/2022  · Completed radiation to the right sacral metastasis on 10/11/2022.  · PET/CT 10/25/2022 without any metabolic uptake in the lung or the skeletal metastasis.  This is indicative of a positive response to Taxotere.  · Pain persistent sitting and walking position and not present when she is laying down.  · Patient is still very  active and performance status is very poor.  Due to this reason chemotherapy will be continued to be held.  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  · Due to this reason an MRI of the brain was obtained but unfortunately this was performed without contrast.  Reviewed the MRI and no evidence of metastatic disease.  · 11/15/2022 lumbar puncture shows no evidence of malignancy in the CSF, elevated protein at 65.2, glucose normal at 58, culture no growth in 3 days  · Repeat MRI of the brain with contrast 11/21/2022 does not show any obvious abnormalities.  There is no enhancement of the CSF.  MRI of the cervical spine with no abnormalities.  · Patient unable to undergo MRI of the thoracic and lumbar spine due to mental status and unable to being in 1 position as well as with incontinence.  · 1/4/2023-mental status has improved significantly.  Patient reports decreased oral intake and nausea and vomiting.  She is also reporting abdominal discomfort.  · 1/12/2023-PET/CT with mediastinal lymphadenopathy as well as multiple liver lesions suggestive of disease progression.  · 1/23/2023-MRI of the brain with at least 9 different foci of small mets measuring up to 6 mm.  There is also areas of encephalomalacia concerning for chronic infarcts.    · Resumed Taxotere 1/31/2023.  Dose reduced to 60 mg per metered square.  · 3/27/2023-PET/CT shows significant response in the chest as well as the hepatic lesions.  · 3/28/2023-MRI of the brain-improvement in the metastatic disease to the brain with decrease in the size as well as decrease in enhancement noted.  · Given that there is improvement in the disease we will continue with Taxotere.  · It is unclear to me as to why the disease in the brain has responded given the fact that she did not receive radiation or any treatment which would potentially cross the blood-brain barrier.  · We will continue with  surveillance MRIs and PET CTs  · Tolerating Taxotere overall well with stable neuropathy.  Continue     *History of bilateral stage I breast cancer  · Patient with bilateral breast cancer, both stage I ([id number]), grade 2, ER/KY positive and HER2/melinda negative with low Ki-67.    · Patient initiated adjnuvant letrozole in May 2021.  · Letrozole discontinued as she is on Taxotere  · No changes    *Mental status changes  · Patient's mental status is back at baseline.  · Significant improvement  · Currently off all psychotropic medications.  · MRI of the brain 3/28/2023 with slight improvement in the 9 lesions noted previously  · Continue MRIs as needed     *Cancer related pain  · Patient has been receiving Duragesic patch 50 mcg every 72 hours in addition to oxycodone 15 mg every 4 hours for breakthrough pain.  Duragesic dose had been escalated recently in the outpatient setting due to worsening pain  · On admission 9/23/2022, Duragesic patch increased to 75 mcg daily, added Decadron 4 mg IV every 6 hours with continuation of oxycodone 15 mg every 4 hours as needed for breakthrough pain in addition of Dilaudid 1 mg every 2 hours as needed for breakthrough pain.  · Patient with increasing side effects from narcotics, Duragesic decreased on 9/25/2022 from 75 down to 50 mcg patch every 72 hours.  · Dexamethasone dose decreased to 4 mg p.o. every 12 hours on 9/27/2022  · Initiated palliative radiation to the right sacrum on 9/28/2022  · Patient became confused after receiving Dilaudid.  Patient and family asked to discontinue Dilaudid.  · Duragesic patch dose was increased to 75 mcg/h on 9/30/2022. Subsequent decrease to 50 mcg due to confusion  · Gabapentin was discontinued due to the sedating effect.  · Oxycodone is being used for breakthrough pain.  · 10/3 transition to sustained release oxycodone 40 mg bid and fentanyl patch stopped  · Poorly controlled, pain particularly worse in sitting and standing  position.  · Unsure if extends is contributing to mental status changes as previously patient had been very sensitive to pain meds.  · Due to this reason we will discontinue the Xtampza ER and switch to OxyContin 20 mg twice daily.  · She continues to experience severe mental status changes.  · Long-acting pain medications discontinued due to mental status changes and she is currently on oxycodone 15 mg as needed.  · Reports better pain control after the epidural injection of the spine.  · Patient discussing wanting to get back in with Dr. Breann Santiago, pain management, for possible additional epidural injection which was helpful back in February 2023.  Patient given contact information.      *Anxiety/depression  · Currently all medications have been discontinued due to mental status changes  · Continue Xanax as needed  · Well-controlled     *Insomnia  · She has been receiving Xanax as needed     *Anemia  · Hemoglobin 7.6 3/14/2023.  B12, ferritin, iron studies, and folate unremarkable.  · 1 unit of PRBC given 3/16/2023  · 3/21/2023 hemoglobin 8.4  · 3/14/2023-iron studies reviewed and iron saturation 24%, TIBC 329, ferritin 189  · Vitamin B12 borderline at 376, folic acid normal at greater than 20  · Recommended starting B12 supplementation     *Peripheral neuropathy  · Patient was on gabapentin 300 mg twice daily.  · Gabapentin was discontinued on 9/30/2022.  · Neuropathy stable  · Patient noting heaviness to both lower extremities when seen on 3/21/2023, possible worsening neuropathy secondary to Taxotere?  Reevaluate further at next visit once anticoagulant changed and possibly acute DVT pain eliminated.     *Nausea vomiting and abdominal discomfort  · Continues to experience vomiting.  · Abdominal discomfort has resolved  · PET/CT with liver metastasis.  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  · Continue Compazine as well as Zofran as needed  · Nausea and vomiting have pretty much  resolved.  · Noted to have area of focal uptake on the PET/CT over the gallbladder which is suggestive of cholecystitis. Course of Levaquin prescribed.  · Not a complaint today.    *Mouth sores  · 3/21/2023 patient reporting mouth sores or not being helped with her current Magic mouth rinse.  Discussed with Brooklynn Powell RN and current prescription does not have steroids added, requested dexamethasone to be added to new prescription.    *GI prophylaxis  · Continue omeprazole daily  · No changes  · PET/CT suggestive of esophagitis    *Deconditioning  · Steadily improving  · She is ambulating more than before    *Hypokalemia  · Discontinued potassium replacement  · Potassium level remains normal    *Brain metastasis  · Repeat MRI of the brain in March 28 2023  · Improvement in disease noted    *Acute left soleal deep vein thrombosis 3/21/2023  · Patient seen 3/21/2023 after having a left lower extremity Doppler due to reports of severe left lower extremity pain with redness and some swelling noted.  Preliminary results positive for acute soleal DVT.  Patient continues on Eliquis 5 mg twice daily and denies missing doses.  Case discussed with Dr. Snell and we will obtain additional lab work including beta-2 glycoprotein, lupus anticoagulant, and anticardiolipin antibody.  We will proceed with switching her Eliquis to Xarelto 15 mg twice daily x21 days then 20 mg daily thereafter.  She was given samples for the loading dose and will need a prescription at a later date for the 20 mg once daily.  · Hypercoagulability work-up particularly antiphospholipid syndrome labs have been reviewed and negative  · Continue Xarelto, no indication to switch to Coumadin    RECOMMENDATIONS:    1. Proceed with C8 Taxotere.  2. Continue Xarelto  3. Continue Gwen's Magic mouth rinse with dexamethasone.  4. Continue oxycodone 15 mg every 4 hours as needed for pain control.  5. Patient given contact information for Dr. Breann Santiago,  pain management, to get back in for potential further epidural injection into L5-S1 space.  6. Return in 3 weeks for follow-up with Dr. Snell and cycle 8 Taxotere.    This patient is on high risk drug therapy requiring intensive monitoring for toxicity.        aMrly Calhoun APRN

## 2023-04-26 ENCOUNTER — INFUSION (OUTPATIENT)
Dept: ONCOLOGY | Facility: HOSPITAL | Age: 58
End: 2023-04-26
Payer: COMMERCIAL

## 2023-04-26 ENCOUNTER — TELEPHONE (OUTPATIENT)
Dept: ORTHOPEDIC SURGERY | Facility: CLINIC | Age: 58
End: 2023-04-26
Payer: COMMERCIAL

## 2023-04-26 DIAGNOSIS — C79.51 NON-SMALL CELL LUNG CANCER METASTATIC TO BONE: Primary | ICD-10-CM

## 2023-04-26 DIAGNOSIS — M71.122 SEPTIC OLECRANON BURSITIS OF LEFT ELBOW: Primary | ICD-10-CM

## 2023-04-26 DIAGNOSIS — C34.90 NON-SMALL CELL LUNG CANCER METASTATIC TO BONE: Primary | ICD-10-CM

## 2023-04-26 DIAGNOSIS — G89.29 ELBOW PAIN, CHRONIC, LEFT: ICD-10-CM

## 2023-04-26 DIAGNOSIS — M25.522 ELBOW PAIN, CHRONIC, LEFT: ICD-10-CM

## 2023-04-26 LAB
BACTERIA FLD CULT: ABNORMAL
GRAM STN SPEC: ABNORMAL
GRAM STN SPEC: ABNORMAL

## 2023-04-26 PROCEDURE — 25010000002 PEGFILGRASTIM-CBQV 6 MG/0.6ML SOLUTION PREFILLED SYRINGE: Performed by: NURSE PRACTITIONER

## 2023-04-26 PROCEDURE — 96372 THER/PROPH/DIAG INJ SC/IM: CPT

## 2023-04-26 RX ADMIN — PEGFILGRASTIM-CBQV 6 MG: 6 INJECTION, SOLUTION SUBCUTANEOUS at 13:16

## 2023-04-26 NOTE — TELEPHONE ENCOUNTER
Caller:  BK CARPENTER    Relationship:     Best call back number: [phone]     Requested Prescriptions:   ANTIBIOTIC    Pharmacy where request should be sent:  [facility]    Last office visit with prescribing clinician: 4/24/2023   Last telemedicine visit with prescribing clinician: Visit date not found   Next office visit with prescribing clinician: 5/15/2023     Additional details provided by patient: PATIENT'S  IS NOT ORTHO AUTH FORM    Does the patient have less than a 3 day supply:  [x] Yes  [] No    Would you like a call back once the refill request has been completed: [x] Yes [] No    If the office needs to give you a call back, can they leave a voicemail: [x] Yes [] No    Pepito Samuels Rep   04/26/23 10:02 EDT

## 2023-04-27 ENCOUNTER — TELEPHONE (OUTPATIENT)
Dept: FAMILY MEDICINE CLINIC | Facility: CLINIC | Age: 58
End: 2023-04-27
Payer: COMMERCIAL

## 2023-04-27 ENCOUNTER — TELEPHONE (OUTPATIENT)
Dept: ONCOLOGY | Facility: CLINIC | Age: 58
End: 2023-04-27
Payer: COMMERCIAL

## 2023-04-27 DIAGNOSIS — R29.898 RIGHT ARM WEAKNESS: ICD-10-CM

## 2023-04-27 DIAGNOSIS — C34.90 NON-SMALL CELL LUNG CANCER METASTATIC TO BONE: Primary | ICD-10-CM

## 2023-04-27 DIAGNOSIS — C79.51 NON-SMALL CELL LUNG CANCER METASTATIC TO BONE: Primary | ICD-10-CM

## 2023-04-27 RX ORDER — SULFAMETHOXAZOLE AND TRIMETHOPRIM 800; 160 MG/1; MG/1
1 TABLET ORAL 2 TIMES DAILY
Qty: 20 TABLET | Refills: 0 | Status: SHIPPED | OUTPATIENT
Start: 2023-04-27 | End: 2023-04-27 | Stop reason: DRUGHIGH

## 2023-04-27 RX ORDER — SULFAMETHOXAZOLE AND TRIMETHOPRIM 800; 160 MG/1; MG/1
2 TABLET ORAL 2 TIMES DAILY
Qty: 40 TABLET | Refills: 0 | OUTPATIENT
Start: 2023-04-27 | End: 2023-05-01 | Stop reason: HOSPADM

## 2023-04-27 NOTE — TELEPHONE ENCOUNTER
Jelani called me today describing a couple of incidents where Holli had decreased use of her right arm. He reports after they returned home on Monday from the orthopedic office Holli got in and said her right arm felt funny.  He reports she then had some garbled speech but then resolved quickly.  He reports she had a similar incident this morning. She is not having any other neurological deficits.  Reviewed with Dr Snell, will order MRI of the brain to reevaluate.  Relayed this back to Jelani.  He voiced understanding.

## 2023-04-27 NOTE — TELEPHONE ENCOUNTER
I sent this in but I don't see it on the med sheet.     I have sent again. If they RX does not get the prescription I will call it in directly.       **addendum final c&S resulted and Bactrim not listed on sens. I will discuss with EDUAR and decide which abx to send.   Have called patient and left  for return call to discuss 10:01 EDT 04/27/23

## 2023-04-27 NOTE — TELEPHONE ENCOUNTER
PATIENT'S  WANTS TO KNOW IF THERE WAS A PRIOR AUTHORIZATION STARTED FOR THE BLADDER CONTROL MEDICATION OR IF THERE WAS GOING TO BE A DIFFERENT ONE SENT IN.

## 2023-04-27 NOTE — TELEPHONE ENCOUNTER
----- Message from Brooklynn Powell RN sent at 4/27/2023  3:25 PM EDT -----  Ordered MRI brain, stat, can be tomorrow.  Call her , he's aware    Thank you!

## 2023-04-27 NOTE — TELEPHONE ENCOUNTER
Spoke with the infectious disease office.  Patient has seen Dr. Hunt in the past with a previous hospitalization.  Unfortunately he is out of the office the remainder of the week.  She will send him a message when he gets back on Monday for him to evaluate the chart and then will let them know when they can work her in.  Patient has been notified

## 2023-04-27 NOTE — TELEPHONE ENCOUNTER
I spoke with inpatient ID pharmacist Jon who recommended Bactrim DS 2 PO BID as a single agent to cover until can be seen by ID. Bactrim should have good coverage. Will initiate urgent referral to ID for further management.    [facility]- spoke with their pharmacist directly.    I have called the patient and her , spoke with them about the treatment plans and they will  the bactrim today and start the medication. They will await call from ID to schedule appointment. Clinically they are reporting no fevers or chills, the elbow feels about the same as it did when they were in the office this week.     Will con't to monitor.

## 2023-04-28 ENCOUNTER — TELEPHONE (OUTPATIENT)
Dept: ONCOLOGY | Facility: CLINIC | Age: 58
End: 2023-04-28
Payer: COMMERCIAL

## 2023-04-28 ENCOUNTER — PRIOR AUTHORIZATION (OUTPATIENT)
Dept: ONCOLOGY | Facility: CLINIC | Age: 58
End: 2023-04-28
Payer: COMMERCIAL

## 2023-04-28 ENCOUNTER — HOSPITAL ENCOUNTER (OUTPATIENT)
Dept: MRI IMAGING | Facility: HOSPITAL | Age: 58
Discharge: HOME | End: 2023-04-28
Payer: COMMERCIAL

## 2023-04-28 ENCOUNTER — TELEPHONE (OUTPATIENT)
Dept: PAIN MEDICINE | Facility: CLINIC | Age: 58
End: 2023-04-28
Payer: COMMERCIAL

## 2023-04-28 ENCOUNTER — HOSPITAL ENCOUNTER (OUTPATIENT)
Facility: HOSPITAL | Age: 58
Discharge: HOME | End: 2023-05-01
Attending: INTERNAL MEDICINE | Admitting: INTERNAL MEDICINE
Payer: COMMERCIAL

## 2023-04-28 DIAGNOSIS — C79.51 NON-SMALL CELL LUNG CANCER METASTATIC TO BONE: Primary | ICD-10-CM

## 2023-04-28 DIAGNOSIS — C34.90 NON-SMALL CELL LUNG CANCER METASTATIC TO BONE: Primary | ICD-10-CM

## 2023-04-28 DIAGNOSIS — R29.898 RIGHT ARM WEAKNESS: ICD-10-CM

## 2023-04-28 DIAGNOSIS — Z45.2 ENCOUNTER FOR FITTING AND ADJUSTMENT OF VASCULAR CATHETER: ICD-10-CM

## 2023-04-28 PROBLEM — A49.01 MSSA INFECTION, NON-INVASIVE: Status: ACTIVE | Noted: 2023-04-28

## 2023-04-28 PROBLEM — R93.0 ABNORMAL MRI OF HEAD: Status: ACTIVE | Noted: 2023-04-28

## 2023-04-28 PROBLEM — R90.89 ABNORMAL FINDING ON MRI OF BRAIN: Status: ACTIVE | Noted: 2023-04-28

## 2023-04-28 PROBLEM — M71.022: Status: ACTIVE | Noted: 2023-04-28

## 2023-04-28 LAB — GLUCOSE BLDC GLUCOMTR-MCNC: 72 MG/DL (ref 70–130)

## 2023-04-28 PROCEDURE — 82948 REAGENT STRIP/BLOOD GLUCOSE: CPT

## 2023-04-28 PROCEDURE — G0378 HOSPITAL OBSERVATION PER HR: HCPCS

## 2023-04-28 PROCEDURE — 0 GADOBENATE DIMEGLUMINE 529 MG/ML SOLUTION: Performed by: INTERNAL MEDICINE

## 2023-04-28 PROCEDURE — 70553 MRI BRAIN STEM W/O & W/DYE: CPT

## 2023-04-28 PROCEDURE — A9577 INJ MULTIHANCE: HCPCS | Performed by: INTERNAL MEDICINE

## 2023-04-28 RX ORDER — ONDANSETRON 2 MG/ML
4 INJECTION INTRAMUSCULAR; INTRAVENOUS EVERY 6 HOURS PRN
Status: DISCONTINUED | OUTPATIENT
Start: 2023-04-28 | End: 2023-05-01 | Stop reason: HOSPADM

## 2023-04-28 RX ORDER — ASPIRIN 300 MG/1
300 SUPPOSITORY RECTAL DAILY
Status: DISCONTINUED | OUTPATIENT
Start: 2023-04-28 | End: 2023-04-30

## 2023-04-28 RX ORDER — OXYCODONE HYDROCHLORIDE 15 MG/1
15 TABLET ORAL EVERY 4 HOURS PRN
Status: DISCONTINUED | OUTPATIENT
Start: 2023-04-28 | End: 2023-05-01 | Stop reason: HOSPADM

## 2023-04-28 RX ORDER — SODIUM CHLORIDE 0.9 % (FLUSH) 0.9 %
10 SYRINGE (ML) INJECTION AS NEEDED
Status: DISCONTINUED | OUTPATIENT
Start: 2023-04-28 | End: 2023-04-29 | Stop reason: HOSPADM

## 2023-04-28 RX ORDER — LEVOTHYROXINE SODIUM 0.03 MG/1
25 TABLET ORAL
Status: DISCONTINUED | OUTPATIENT
Start: 2023-04-28 | End: 2023-04-29

## 2023-04-28 RX ORDER — LETROZOLE 2.5 MG/1
2.5 TABLET, FILM COATED ORAL DAILY
Status: DISCONTINUED | OUTPATIENT
Start: 2023-04-28 | End: 2023-05-01 | Stop reason: HOSPADM

## 2023-04-28 RX ORDER — SODIUM CHLORIDE 0.9 % (FLUSH) 0.9 %
10 SYRINGE (ML) INJECTION AS NEEDED
OUTPATIENT
Start: 2023-04-28

## 2023-04-28 RX ORDER — SODIUM CHLORIDE 9 MG/ML
40 INJECTION, SOLUTION INTRAVENOUS AS NEEDED
Status: DISCONTINUED | OUTPATIENT
Start: 2023-04-28 | End: 2023-05-01 | Stop reason: HOSPADM

## 2023-04-28 RX ORDER — OXYBUTYNIN CHLORIDE 5 MG/1
5 TABLET ORAL 3 TIMES DAILY
Status: DISCONTINUED | OUTPATIENT
Start: 2023-04-28 | End: 2023-05-01 | Stop reason: HOSPADM

## 2023-04-28 RX ORDER — HEPARIN SODIUM (PORCINE) LOCK FLUSH IV SOLN 100 UNIT/ML 100 UNIT/ML
500 SOLUTION INTRAVENOUS AS NEEDED
OUTPATIENT
Start: 2023-04-28

## 2023-04-28 RX ORDER — ASPIRIN 325 MG
325 TABLET ORAL DAILY
Status: DISCONTINUED | OUTPATIENT
Start: 2023-04-28 | End: 2023-04-30

## 2023-04-28 RX ORDER — SODIUM CHLORIDE 9 MG/ML
75 INJECTION, SOLUTION INTRAVENOUS CONTINUOUS
Status: DISCONTINUED | OUTPATIENT
Start: 2023-04-28 | End: 2023-04-30

## 2023-04-28 RX ORDER — SODIUM CHLORIDE 0.9 % (FLUSH) 0.9 %
10 SYRINGE (ML) INJECTION EVERY 12 HOURS SCHEDULED
Status: DISCONTINUED | OUTPATIENT
Start: 2023-04-28 | End: 2023-05-01 | Stop reason: HOSPADM

## 2023-04-28 RX ORDER — CEPHALEXIN 500 MG/1
500 CAPSULE ORAL EVERY 8 HOURS SCHEDULED
Status: DISCONTINUED | OUTPATIENT
Start: 2023-04-28 | End: 2023-04-30

## 2023-04-28 RX ORDER — SODIUM CHLORIDE 0.9 % (FLUSH) 0.9 %
10 SYRINGE (ML) INJECTION AS NEEDED
Status: DISCONTINUED | OUTPATIENT
Start: 2023-04-28 | End: 2023-05-01 | Stop reason: HOSPADM

## 2023-04-28 RX ORDER — POTASSIUM CHLORIDE 750 MG/1
20 TABLET, FILM COATED, EXTENDED RELEASE ORAL DAILY
Status: DISCONTINUED | OUTPATIENT
Start: 2023-04-28 | End: 2023-05-01 | Stop reason: HOSPADM

## 2023-04-28 RX ORDER — FOLIC ACID 1 MG/1
1000 TABLET ORAL DAILY
Status: DISCONTINUED | OUTPATIENT
Start: 2023-04-29 | End: 2023-05-01 | Stop reason: HOSPADM

## 2023-04-28 RX ORDER — ATORVASTATIN CALCIUM 80 MG/1
80 TABLET, FILM COATED ORAL NIGHTLY
Status: DISCONTINUED | OUTPATIENT
Start: 2023-04-28 | End: 2023-05-01 | Stop reason: HOSPADM

## 2023-04-28 RX ORDER — SODIUM CHLORIDE 0.9 % (FLUSH) 0.9 %
20 SYRINGE (ML) INJECTION AS NEEDED
Status: DISCONTINUED | OUTPATIENT
Start: 2023-04-28 | End: 2023-05-01 | Stop reason: HOSPADM

## 2023-04-28 RX ORDER — OXYBUTYNIN CHLORIDE 5 MG/1
5 TABLET ORAL 3 TIMES DAILY
Qty: 90 TABLET | Refills: 1 | Status: SHIPPED | OUTPATIENT
Start: 2023-04-28

## 2023-04-28 RX ORDER — HEPARIN SODIUM (PORCINE) LOCK FLUSH IV SOLN 100 UNIT/ML 100 UNIT/ML
500 SOLUTION INTRAVENOUS AS NEEDED
Status: DISCONTINUED | OUTPATIENT
Start: 2023-04-28 | End: 2023-04-29 | Stop reason: HOSPADM

## 2023-04-28 RX ORDER — PANTOPRAZOLE SODIUM 40 MG/1
40 TABLET, DELAYED RELEASE ORAL
Status: DISCONTINUED | OUTPATIENT
Start: 2023-04-29 | End: 2023-05-01 | Stop reason: HOSPADM

## 2023-04-28 RX ADMIN — OXYBUTYNIN CHLORIDE 5 MG: 5 TABLET ORAL at 21:08

## 2023-04-28 RX ADMIN — OXYBUTYNIN CHLORIDE 5 MG: 5 TABLET ORAL at 17:54

## 2023-04-28 RX ADMIN — Medication 10 ML: at 21:08

## 2023-04-28 RX ADMIN — ATORVASTATIN CALCIUM 80 MG: 80 TABLET, FILM COATED ORAL at 21:08

## 2023-04-28 RX ADMIN — OXYCODONE HYDROCHLORIDE 15 MG: 15 TABLET ORAL at 17:56

## 2023-04-28 RX ADMIN — SODIUM CHLORIDE 75 ML/HR: 9 INJECTION, SOLUTION INTRAVENOUS at 21:09

## 2023-04-28 RX ADMIN — ASPIRIN 325 MG: 325 TABLET ORAL at 17:55

## 2023-04-28 RX ADMIN — GADOBENATE DIMEGLUMINE 13 ML: 529 INJECTION, SOLUTION INTRAVENOUS at 10:33

## 2023-04-28 RX ADMIN — CEPHALEXIN 500 MG: 500 CAPSULE ORAL at 17:54

## 2023-04-28 RX ADMIN — OXYCODONE HYDROCHLORIDE 15 MG: 15 TABLET ORAL at 22:11

## 2023-04-28 NOTE — TELEPHONE ENCOUNTER
Caller: BK     Relationship to patient: SPOUSE    Best call back number: [phone]    Patient is needing: PATIENT'S SPOUSE, BK WAS CALLING TO LET DR. ALDRIDGE KNOW THAT HE IS UNABLE TO FIGURE OUT MYCHART TO GET ON THE VIDEO VISIT FOR THIS APPT AT 9:00 AM. BK IS WANTING TO KNOW IF YOU ALL COULD DO A TELEPHONE VISIT? BK STATED HE IS DONE TRYING TO FIGURE OUT THE MYCHART AND WOULD BE LOOKING OUT FOR A LINK OR CALL AT 9:00 AM BUT IF HE CAN'T FIGURE IT OUT THEN HE CAN'T. I ATTEMPTED TO WARM TRANSFER CALL TO THE OFFICE FOR BK TO DISCUSS THIS BUT RECEIVED NO ANSWER. THANK YOU!

## 2023-04-28 NOTE — TELEPHONE ENCOUNTER
Call to Mr. Patel to let him know we have not heard back from the hospital yet, and reviewed with Dr. Snell, they should go ahead and go to the ER, as Dr. Snell is concerned that she could have more major issues, given the past few days events.  Mr. Patel states they will go, but asked that we call him back if the hospital calls with a room in the next 20-30 minutes, as they really hate to go through the ER.  Let him know this RN will be sure and update him as details are given. He verbalized thanks and understanding.

## 2023-04-28 NOTE — H&P
Internal medicine history and physical  INTERNAL MEDICINE   [facility]       Patient Identification:  Name: Holli Patel  Age: 57 y.o.  Sex: female  :  1965  MRN: [id number]                   Primary Care Physician: Vandana Gastelum MD                               Date of admission:2023    Chief Complaint: Episodic right arm and right leg weakness since 2023 afternoon.    History of Present Illness:   Patient is a 57-year-old female who has complicated past medical history including history of DVT on anticoagulation therapy, history of L5-S1 degenerative disc disease status post steroid injection, history of metastatic lung cancer, history of breast cancer, mets to her bone as well as to her brain who is under the care of Dr. Snell has been having issues with left elbow bursitis.  Patient was seen at the oncology clinic on 2023 and plan was to continue with cycle 8 of Taxotere along with steroids and continue with Xarelto.  A day prior patient went to orthopedic surgery service for pain and discomfort in her left elbow olecranon process.  Patient apparently had inflammation and cellulitis in that area which was treated with Augmentin with improvement in the redness around the tip of the elbow with persistent swelling.  On 2023 at orthopedic surgery office her olecranon bursa was aspirated and material was sent for Gram stain and culture and patient was started on oral Bactrim with plans to follow-up on culture results to further adjust antibiotic therapy.  According to the patient and her  after the orthopedic visit on 2023 as she was being helped to get inside the house she complained of sudden weakness of right arm and right leg and felt like something cracked in her head.  She was eased to her bed and 5 minutes later she was feeling fine.  She has similar symptoms of fairly sudden onset occurred since and this was communicated to Dr. Snell and  MRI of her brain was ordered.  Patient had MRI of the brain performed earlier today and result was called to Dr. Snell's office about possibility of new stroke.  I do not have MRI report available and not communicated to me in terms of the location of infarcts that are seen.  Patient still have persistent weakness of the right hand and unable to grasping since her last episode yesterday.  She did have weakness in the right leg but it is better.  Patient is being admitted directly from home for further management of acute CVA seen in the MRI as reported to Dr. Snell.  Patient has been started on stroke protocol with assessment of her swallow function and initiation of aspirin and statin with statin neurology consult requested.  Patient is awake alert communicative and does not appear to be in any acute distress and admits to the weakness of right hand arm and right leg.  Patient denies any difficulty in thinking understanding or ability to talk.      Past Medical History:  Past Medical History:   Diagnosis Date   • Anxiety    • Clot     POSSIBLE BLOOD CLOT IN VENOUS ACCESS DEVICE-PT STATES WAS STARTED ON ELIQUIS    • Dyspnea on exertion    • Elevated cholesterol    • Frequent urination     DAY AND NIGHT   • SHAYY (generalized anxiety disorder)     RELATED HIGH BLOOD PRESSURE   • GERD (gastroesophageal reflux disease)    • High blood pressure     ANXIETY RELATED   • Hyperlipidemia    • Hypothyroidism    • Lung cancer    • Lung nodule     LEFT   • Malignant neoplasm of upper-outer quadrant of right breast in female, estrogen receptor positive 4/13/2021    PT STATES HAS BILAT BREAST CANCER   • Migraine    • PONV (postoperative nausea and vomiting)      Past Surgical History:  Past Surgical History:   Procedure Laterality Date   • BREAST BIOPSY Bilateral 04/07/2021    Right Breast 10 o'clock & Left breast 10 o'clock 6 cm from nipple, [facility]   • CLOSED REDUCTION HIP DISLOCATION Left 4/30/2021    Procedure: BRONCHOSCOPY,  LEFT VIDEO ASSITED THORACOSCOPY, ROBOTIC ASSSITED MEDIASTINAL LYMPHADENECTOMY WITH INTERCOSTAL NERVE BLOCKS;  Surgeon: Raphael Kerr III, MD;  [facility];  Service: DaVinci;  Laterality: Left;   • COLONOSCOPY     • EPIDURAL Bilateral 12/28/2022    Procedure: LUMBAR/SACRAL TRANSFORAMINAL EPIDURAL Bilateral L5-S1;  Surgeon: Breann Santiago MD;  Location: [facility];  Service: Pain Management;  Laterality: Bilateral;   • EPIDURAL Right 2/13/2023    Procedure: LUMBAR/SACRAL TRANSFORAMINAL EPIDURAL Right L5-S1 01047 (Hold eliqus 3 days);  Surgeon: Breann Santiago MD;  Location: [facility];  Service: Pain Management;  Laterality: Right;   • TUBAL ABDOMINAL LIGATION     • VENOUS ACCESS DEVICE (PORT) INSERTION Right 5/20/2021    Procedure: INSERTION VENOUS ACCESS DEVICE;  Surgeon: Raphael Kerr III, MD;  Location: [facility] MAIN OR;  Service: Thoracic;  Laterality: Right;   • VENOUS ACCESS DEVICE (PORT) INSERTION Right 11/29/2021    Procedure: REVISION AND REPLACEMENT RIGHT SUBCLAVIAN VENOUS ACCESS PORT;  Surgeon: Raphael Kerr III, MD;  Location: [facility] MAIN OR;  Service: Thoracic;  Laterality: Right;   • WRIST SURGERY Right       Home Meds:  Medications Prior to Admission   Medication Sig Dispense Refill Last Dose   • [START ON 5/19/2023] ALPRAZolam (XANAX) 0.5 MG tablet 1 BY MOUTH EVERY 6 HOURS, CHANGE IN QUANTITY, METASTATIC CANCER 120 tablet 2    • dexamethasone (DECADRON) 2 MG tablet Take 1 tablet by mouth Daily. Take at noon 30 tablet 3    • dexamethasone (DECADRON) 4 MG tablet Take 2 tablets oral twice a day for 3 consecutive days beginning the day before chemotherapy and continue for 6 doses. 12 tablet 3    • folic acid (FOLVITE) 1 MG tablet TAKE 1 TABLET BY MOUTH EVERY DAY 90 tablet 1    • letrozole (FEMARA) 2.5 MG tablet Take 1 tablet by mouth Daily. 90 tablet 3    • levothyroxine (SYNTHROID, LEVOTHROID) 25 MCG tablet Take 1 tablet by mouth Daily. (Patient not taking: Reported on  2023) 90 tablet 1    • naloxone (Narcan) 4 MG/0.1ML nasal spray 1 spray into the nostril(s) as directed by provider As Needed (high dose mme or any narcotic with benzo). 1 each 1    • omeprazole (PrilOSEC) 20 MG capsule Take 1 capsule by mouth 2 (Two) Times a Day. 180 capsule 1    • ondansetron (Zofran) 8 MG tablet Take 1 tablet by mouth Every 8 (Eight) Hours As Needed for Nausea or Vomiting. (Patient not taking: Reported on 2023) 90 tablet 3    • oxybutynin (DITROPAN) 5 MG tablet Take 1 tablet by mouth 3 (Three) Times a Day. 90 tablet 1    • oxyCODONE (ROXICODONE) 15 MG immediate release tablet Take 1 tablet by mouth Every 4 (Four) Hours As Needed for Moderate Pain. 180 tablet 0    • potassium chloride (MICRO-K) 10 MEQ CR capsule TAKE 2 CAPSULES BY MOUTH EVERY DAY (Patient not taking: Reported on 2023) 60 capsule 1    • rivaroxaban (XARELTO) 20 MG tablet Take 1 tablet by mouth Daily. 14 tablet 0    • rizatriptan (MAXALT) 10 MG tablet Take 1 tablet by mouth 1 (One) Time for 1 dose. AT ONSET OF HEADACHE MAY REPEAT ONE TABLET IN 2 HOURS IF NEEDED 12 tablet 5    • sulfamethoxazole-trimethoprim (Bactrim DS) 800-160 MG per tablet Take 2 tablets by mouth 2 (Two) Times a Day. 40 tablet 0      Current Meds:   No current facility-administered medications for this encounter.    Facility-Administered Medications Ordered in Other Encounters:   •  heparin injection 500 Units, 500 Units, Intravenous, PRNAlis Mounika, MD  •  sodium chloride 0.9 % flush 10 mL, 10 mL, Intravenous, PRN, Salma Snell MD  Allergies:  No Known Allergies  Social History:   Social History     Tobacco Use   • Smoking status: Former     Packs/day: 1.00     Years: 30.00     Pack years: 30.00     Types: Electronic Cigarette, Cigarettes     Start date: 1981     Quit date:      Years since quittin.3   • Smokeless tobacco: Never   • Tobacco comments:     ABT 15 CIGARETTES DAILY   Substance Use Topics   • Alcohol use: Never       Family History:  Family History   Problem Relation Age of Onset   • Cancer Father         LYMPHATIC   • Prostate cancer Father    • Lymphoma Father    • Alcohol abuse Brother    • Colon cancer Maternal Grandmother    • Malig Hyperthermia Neg Hx           Review of Systems  See history of present illness and past medical history.  As described in the history of presenting illness.      Vitals:   /90 (BP Location: Left arm, Patient Position: Lying)   Pulse 94   Temp 97.7 °F (36.5 °C) (Oral)   Resp 18   LMP  (LMP Unknown)   SpO2 99%   I/O: No intake or output data in the 24 hours ending 04/28/23 1449  Exam:  Patient is examined using the personal protective equipment as per guidelines from infection control for this particular patient as enacted.  Hand washing was performed before and after patient interaction.  General Appearance:   Alert cooperative female who admits that she has poor memory and needed assistance from her  who spoke to us through her phone with speaker on.   Head:    Normocephalic, without obvious abnormality, atraumatic   Eyes:    PERRL, conjunctiva/corneas clear, EOM's intact, both eyes   Ears:    Normal external ear canals, both ears   Nose:   Nares normal, septum midline, mucosa normal, no drainage    or sinus tenderness   Throat:   Lips, tongue, gums normal; oral mucosa pink and moist   Neck:   Supple, symmetrical, trachea midline, no adenopathy;     thyroid:  no enlargement/tenderness/nodules; no carotid    bruit or JVD   Back:     Symmetric, no curvature, ROM normal, no CVA tenderness   Lungs:     Clear to auscultation bilaterally, respirations unlabored   Chest Wall:    No tenderness or deformity, right upper chest Mediport in place    Heart:   S1 S2 regular   Abdomen:    Obese soft nontender   Extremities:  Left elbow has fluctuant mildly tender noncellulitic swelling at the olecranon process.   Pulses:   Pulses palpable in all extremities; symmetric all extremities    Skin:  Ecchymotic changes noted   Neurologic:  Right pronator drift along with decreased strength in the right forearm and hand with able to make fist but unable to grasping.  Minimal weakness of the right leg noted.       Data Review:      I reviewed the patient's new clinical results.  Results from last 7 days   Lab Units 04/25/23  1036   WBC 10*3/mm3 17.39*   HEMOGLOBIN g/dL 8.3*   PLATELETS 10*3/mm3 146     Results from last 7 days   Lab Units 04/25/23  1036   SODIUM mmol/L 142   POTASSIUM mmol/L 4.3   CHLORIDE mmol/L 104   CO2 mmol/L 24.2   BUN mg/dL 26*   CREATININE mg/dL 1.12*   CALCIUM mg/dL 8.9   GLUCOSE mg/dL 126*     Microbiology Results (last 10 days)     Procedure Component Value - Date/Time    Body Fluid Culture - Body Fluid, Elbow, Left [[id number]]  (Abnormal)  (Susceptibility) Collected: 04/24/23 1113    Lab Status: Final result Specimen: Body Fluid from Elbow, Left Updated: 04/26/23 0730     Body Fluid Culture Staphylococcus aureus     Gram Stain Moderate (3+) WBCs seen      Occasional Gram positive cocci    Susceptibility      Staphylococcus aureus      DAFNE      Gentamicin Susceptible      Oxacillin Susceptible      Rifampin Susceptible      Vancomycin Susceptible                       Susceptibility Comments     Staphylococcus aureus    This isolate does not demonstrate inducible clindamycin resistance in vitro.               Anaerobic Culture - Swab, Elbow, Left [[id number]]  (Normal) Collected: 04/24/23 1113    Lab Status: Preliminary result Specimen: Swab from Elbow, Left Updated: 04/27/23 0919     Anaerobic Culture No anaerobes isolated at 3 days            Assessment:  Active Hospital Problems    Diagnosis  POA   • **Abnormal MRI of head [R93.0]  Unknown   • MSSA infection, non-invasive [A49.01]  Unknown   • Olecranon bursa abscess, left [M71.022]  Unknown   • Metastasis to brain [C79.31]  Yes   • Polyneuropathy [G62.9]  Yes   • Ataxia [R27.0]  Yes   • Bony metastasis [C79.51]  Yes      4/1/2023 DX Regulatory Update     • Stage 3a chronic kidney disease [N18.31]  Yes   • History of bilateral breast cancer [Z85.3]  Not Applicable   • 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 [C34.90, C79.51]  Yes   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 until she passes the bedside swallow evaluation, start with aspirin and statin and statin neurology consultation.  Get the report of MRI from outside facility.  Based on her clinical examination it appears that the stroke type lesion is affecting anterior fibers of the left coronary radiata and since there is no alteration in her speech cognition is likely white matter CVA.  See orders per protocol.  · Left elbow olecranon bursitis with culture positive for MSSA-DC her Bactrim started on Keflex.  Patient has been requested infectious disease consultation.  We will consult as per patient request [facility].  · Metastatic non- small cell carcinoma of the lung with mets to adrenal, bone and brain.  Consult patient's oncologist and continue with current care program as patient is receiving chemotherapy.  · Stage III kidney disease-avoid nephrotoxic agent and hypotensive episodes.  · Anemia-multifactorial plan is to monitor.  · Leukocytosis-multifactorial including possibility of stimulating factor with chemotherapy that she received recently along with use of steroids.    Chris Miles MD   4/28/2023  14:49 EDT    Parts of this note may be an electronic transcription/translation of spoken language to printed text using the Dragon dictation system.

## 2023-04-28 NOTE — TELEPHONE ENCOUNTER
Call back to Mr. Patel to let him know the hospital had called and the hospitalist is directly admitting the patient.  Let him know they are in the process of cleaning the room, and he should go to registration with patient for further details/instructions.   Amanda verbalized understanding.

## 2023-04-28 NOTE — TELEPHONE ENCOUNTER
PA pending for hospital admit through [facility] provider portal. Available clinicals attached. MRI Brain results not available to attach at the time of submission. Pending ref # [id number]. Annie in bed board notified.

## 2023-04-28 NOTE — TELEPHONE ENCOUNTER
Caller: BK CARPENTER    Relationship: Emergency Contact    Best call back number: [phone]    What was the call regarding: PATIENT'S  STATED THAT IT HAS BEEN 3-4 DAYS SINCE PATIENT WAS PRESCRIBED THIS MEDICATION AND HE HAS NOT HEARD ANYTHING. PATIENT'S  IS CALLING TO REQUEST A STATUS UPDATE ON THE PRIOR AUTHORIZATION AND WOULD LIKE A CALL BACK ASAP. PLEASE ADVISE.    Do you require a callback: YES

## 2023-04-28 NOTE — TELEPHONE ENCOUNTER
I attempted to contact the patient via all three numbers listed on her chart and was unable to reach anyone. Requested that she call the office back to reschedule this telephone visit. Thanks

## 2023-04-29 ENCOUNTER — APPOINTMENT (OUTPATIENT)
Dept: CT IMAGING | Facility: HOSPITAL | Age: 58
End: 2023-04-29
Payer: COMMERCIAL

## 2023-04-29 LAB
ALBUMIN SERPL-MCNC: 3.6 G/DL (ref 3.5–5.2)
ALBUMIN/GLOB SERPL: 1.7 G/DL
ALP SERPL-CCNC: 113 U/L (ref 39–117)
ALT SERPL W P-5'-P-CCNC: 12 U/L (ref 1–33)
ANION GAP SERPL CALCULATED.3IONS-SCNC: 9 MMOL/L (ref 5–15)
AST SERPL-CCNC: 9 U/L (ref 1–32)
BACTERIA SPEC ANAEROBE CULT: NORMAL
BILIRUB SERPL-MCNC: <0.2 MG/DL (ref 0–1.2)
BUN SERPL-MCNC: 31 MG/DL (ref 6–20)
BUN/CREAT SERPL: 30.7 (ref 7–25)
CALCIUM SPEC-SCNC: 7.7 MG/DL (ref 8.6–10.5)
CHLORIDE SERPL-SCNC: 106 MMOL/L (ref 98–107)
CHOLEST SERPL-MCNC: 181 MG/DL (ref 0–200)
CO2 SERPL-SCNC: 23 MMOL/L (ref 22–29)
CREAT SERPL-MCNC: 1.01 MG/DL (ref 0.57–1)
DEPRECATED RDW RBC AUTO: 50.3 FL (ref 37–54)
EGFRCR SERPLBLD CKD-EPI 2021: 64.3 ML/MIN/1.73
ERYTHROCYTE [DISTWIDTH] IN BLOOD BY AUTOMATED COUNT: 15.5 % (ref 12.3–15.4)
GLOBULIN UR ELPH-MCNC: 2.1 GM/DL
GLUCOSE BLDC GLUCOMTR-MCNC: 73 MG/DL (ref 70–130)
GLUCOSE SERPL-MCNC: 82 MG/DL (ref 65–99)
HBA1C MFR BLD: 5 % (ref 4.8–5.6)
HCT VFR BLD AUTO: 24.2 % (ref 34–46.6)
HDLC SERPL-MCNC: 79 MG/DL (ref 40–60)
HGB BLD-MCNC: 7.8 G/DL (ref 12–15.9)
LDLC SERPL CALC-MCNC: 79 MG/DL (ref 0–100)
LDLC/HDLC SERPL: 0.95 {RATIO}
MCH RBC QN AUTO: 29.5 PG (ref 26.6–33)
MCHC RBC AUTO-ENTMCNC: 32.2 G/DL (ref 31.5–35.7)
MCV RBC AUTO: 91.7 FL (ref 79–97)
PLATELET # BLD AUTO: 127 10*3/MM3 (ref 140–450)
PMV BLD AUTO: 9.8 FL (ref 6–12)
POTASSIUM SERPL-SCNC: 4.4 MMOL/L (ref 3.5–5.2)
PROT SERPL-MCNC: 5.7 G/DL (ref 6–8.5)
RBC # BLD AUTO: 2.64 10*6/MM3 (ref 3.77–5.28)
SODIUM SERPL-SCNC: 138 MMOL/L (ref 136–145)
TRIGL SERPL-MCNC: 135 MG/DL (ref 0–150)
VLDLC SERPL-MCNC: 23 MG/DL (ref 5–40)
WBC NRBC COR # BLD: 21.93 10*3/MM3 (ref 3.4–10.8)

## 2023-04-29 PROCEDURE — 70496 CT ANGIOGRAPHY HEAD: CPT

## 2023-04-29 PROCEDURE — 87040 BLOOD CULTURE FOR BACTERIA: CPT | Performed by: STUDENT IN AN ORGANIZED HEALTH CARE EDUCATION/TRAINING PROGRAM

## 2023-04-29 PROCEDURE — 25510000001 IOPAMIDOL PER 1 ML: Performed by: STUDENT IN AN ORGANIZED HEALTH CARE EDUCATION/TRAINING PROGRAM

## 2023-04-29 PROCEDURE — 97530 THERAPEUTIC ACTIVITIES: CPT

## 2023-04-29 PROCEDURE — G0378 HOSPITAL OBSERVATION PER HR: HCPCS

## 2023-04-29 PROCEDURE — 99223 1ST HOSP IP/OBS HIGH 75: CPT | Performed by: STUDENT IN AN ORGANIZED HEALTH CARE EDUCATION/TRAINING PROGRAM

## 2023-04-29 PROCEDURE — 83036 HEMOGLOBIN GLYCOSYLATED A1C: CPT | Performed by: INTERNAL MEDICINE

## 2023-04-29 PROCEDURE — 85027 COMPLETE CBC AUTOMATED: CPT | Performed by: INTERNAL MEDICINE

## 2023-04-29 PROCEDURE — 97535 SELF CARE MNGMENT TRAINING: CPT

## 2023-04-29 PROCEDURE — 80053 COMPREHEN METABOLIC PANEL: CPT | Performed by: INTERNAL MEDICINE

## 2023-04-29 PROCEDURE — 82948 REAGENT STRIP/BLOOD GLUCOSE: CPT

## 2023-04-29 PROCEDURE — 97162 PT EVAL MOD COMPLEX 30 MIN: CPT

## 2023-04-29 PROCEDURE — 70498 CT ANGIOGRAPHY NECK: CPT

## 2023-04-29 PROCEDURE — 80061 LIPID PANEL: CPT | Performed by: INTERNAL MEDICINE

## 2023-04-29 PROCEDURE — 97166 OT EVAL MOD COMPLEX 45 MIN: CPT

## 2023-04-29 PROCEDURE — 99223 1ST HOSP IP/OBS HIGH 75: CPT | Performed by: PSYCHIATRY & NEUROLOGY

## 2023-04-29 RX ORDER — DEXAMETHASONE 2 MG/1
2 TABLET ORAL DAILY
Status: DISCONTINUED | OUTPATIENT
Start: 2023-04-29 | End: 2023-05-01 | Stop reason: HOSPADM

## 2023-04-29 RX ORDER — AMOXICILLIN 250 MG
2 CAPSULE ORAL 2 TIMES DAILY
Status: DISCONTINUED | OUTPATIENT
Start: 2023-04-29 | End: 2023-05-01 | Stop reason: HOSPADM

## 2023-04-29 RX ORDER — ALPRAZOLAM 0.5 MG/1
0.5 TABLET ORAL 4 TIMES DAILY PRN
Status: DISCONTINUED | OUTPATIENT
Start: 2023-04-29 | End: 2023-05-01 | Stop reason: HOSPADM

## 2023-04-29 RX ORDER — AMOXICILLIN 250 MG
2 CAPSULE ORAL 2 TIMES DAILY
Status: DISCONTINUED | OUTPATIENT
Start: 2023-04-29 | End: 2023-04-29

## 2023-04-29 RX ADMIN — LETROZOLE 2.5 MG: 2.5 TABLET ORAL at 08:31

## 2023-04-29 RX ADMIN — RIVAROXABAN 20 MG: 20 TABLET, FILM COATED ORAL at 08:31

## 2023-04-29 RX ADMIN — ALPRAZOLAM 0.5 MG: 0.5 TABLET ORAL at 22:34

## 2023-04-29 RX ADMIN — PANTOPRAZOLE SODIUM 40 MG: 40 TABLET, DELAYED RELEASE ORAL at 06:22

## 2023-04-29 RX ADMIN — ALPRAZOLAM 0.5 MG: 0.5 TABLET ORAL at 17:01

## 2023-04-29 RX ADMIN — DEXAMETHASONE 2 MG: 2 TABLET ORAL at 13:40

## 2023-04-29 RX ADMIN — OXYBUTYNIN CHLORIDE 5 MG: 5 TABLET ORAL at 21:43

## 2023-04-29 RX ADMIN — LEVOTHYROXINE SODIUM 25 MCG: 0.03 TABLET ORAL at 06:22

## 2023-04-29 RX ADMIN — OXYCODONE HYDROCHLORIDE 15 MG: 15 TABLET ORAL at 18:32

## 2023-04-29 RX ADMIN — OXYBUTYNIN CHLORIDE 5 MG: 5 TABLET ORAL at 08:30

## 2023-04-29 RX ADMIN — ATORVASTATIN CALCIUM 80 MG: 80 TABLET, FILM COATED ORAL at 21:43

## 2023-04-29 RX ADMIN — Medication 1000 MCG: at 08:31

## 2023-04-29 RX ADMIN — DOCUSATE SODIUM 50MG AND SENNOSIDES 8.6MG 2 TABLET: 8.6; 5 TABLET, FILM COATED ORAL at 17:01

## 2023-04-29 RX ADMIN — IOPAMIDOL 95 ML: 755 INJECTION, SOLUTION INTRAVENOUS at 21:28

## 2023-04-29 RX ADMIN — OXYBUTYNIN CHLORIDE 5 MG: 5 TABLET ORAL at 17:01

## 2023-04-29 RX ADMIN — CEPHALEXIN 500 MG: 500 CAPSULE ORAL at 06:22

## 2023-04-29 RX ADMIN — ASPIRIN 325 MG: 325 TABLET ORAL at 08:31

## 2023-04-29 RX ADMIN — CEPHALEXIN 500 MG: 500 CAPSULE ORAL at 13:40

## 2023-04-29 RX ADMIN — CEPHALEXIN 500 MG: 500 CAPSULE ORAL at 21:43

## 2023-04-29 RX ADMIN — Medication 10 ML: at 21:43

## 2023-04-29 RX ADMIN — OXYCODONE HYDROCHLORIDE 15 MG: 15 TABLET ORAL at 06:22

## 2023-04-29 RX ADMIN — ALPRAZOLAM 0.5 MG: 0.5 TABLET ORAL at 11:32

## 2023-04-29 RX ADMIN — OXYCODONE HYDROCHLORIDE 15 MG: 15 TABLET ORAL at 13:57

## 2023-04-29 NOTE — PLAN OF CARE
Goal Outcome Evaluation:  Plan of Care Reviewed With: patient           Outcome Evaluation: Pt is a 56 y/o F admitted to [facility] for acute L CVA. PMH includes lung CA w mets to bone and brain. Pt lives at home with her  and states she uses W/C for mobility. She normally is able to transfer herself, but states she has not been able to walk in 8 months, although cannot tell PT why. PT notes from October 2022, pt was ambulating with RW and CGA. MMT revealed inc'd weakness on L side, inconsistent with acute CVA symptoms. She was able to perform bed mobility with SV and stands to transfer over to BSC with CGA. Declines further mobility and sitting UIC today. Pt seems to demo self limiting behavior. She demo's dec'd functional mobility and endurance and will benefit from skilled PT intervention. Pt safe to D/C home with assist when stable.

## 2023-04-29 NOTE — PLAN OF CARE
Goal Outcome Evaluation:  Plan of Care Reviewed With: patient, spouse           Outcome Evaluation: OT-Pt. admitted for abnormal finding on MRI, acute CVA. Hx metastatic lung CA with mets to bone and brain. PLOF pt. received occasional assist for self care from spouse.Reports she stayed mostly in bed. Pt. has a BCS, Rwx, w/c, shower chair at home. Pt. presents with RUE weakness, min impaired coordination R UE/hand, decreased R , decreased functional endurance, balance, strength, ADL performance. LE dressing mod A shoes seated EOB. BSC transfer min A with cues.Pt. states toileting is difficult. Pt. has difficulty to grasp and hold objects R hand. Is able to feed self using L hand to hold utensils. Pt. may benefit from OT to address functional deficits. Anticipate D/c to home with HH as needed.    Patient was not wearing a face mask during this therapy encounter. Therapist used appropriate personal protective equipment including mask and gloves.  Mask used was standard procedure mask. Appropriate PPE was worn during the entire therapy session. Hand hygiene was completed before and after therapy session. Patient is not in enhanced droplet precautions.

## 2023-04-29 NOTE — THERAPY EVALUATION
Patient Name: Holli Patel  : 1965    MRN: [id number]                              Today's Date: 2023       Admit Date: 2023    Visit Dx: No diagnosis found.  Patient Active Problem List   Diagnosis   • History of gastroesophageal reflux (GERD)   • Primary insomnia   • Mixed hyperlipidemia   • Other specified hypothyroidism   • Anxiety   • History of migraine   • Essential hypertension   • History of colon polyps   • Diverticulosis   • Personal history of tobacco use   • Osteopenia   • Dyspnea   • Malignant neoplasm of upper-outer quadrant of right breast in female, estrogen receptor positive   • Malignant neoplasm of upper-inner quadrant of left breast in female, estrogen receptor positive   • Primary adenocarcinoma of upper lobe of left lung (HCC)   • Encounter for fitting and adjustment of vascular catheter   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   • 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   • History of bilateral breast cancer   • Stage 3a chronic kidney disease   • Chemotherapy-induced neutropenia   • Metabolic acidosis   • Ataxia   • Bony metastasis   • Hypopotassemia   • Hypophosphatemia   • Hypomagnesemia   • Cancer related pain   • Polyneuropathy   • Altered sensorium   • Lumbar radiculopathy   • Lumbar foraminal stenosis   • Deep venous thrombosis   • Benign hypertension with chronic kidney disease   • OAB (overactive bladder)   • Metastasis to brain   • Abnormal MRI of head   • MSSA infection, non-invasive   • Olecranon bursa abscess, left   • Abnormal finding on MRI of brain     Past Medical History:   Diagnosis Date   • Anxiety    • Clot     POSSIBLE BLOOD CLOT IN VENOUS ACCESS DEVICE-PT STATES WAS STARTED ON ELIQUIS    • Dyspnea on exertion    • Elevated cholesterol    • Frequent urination     DAY AND NIGHT   • SHAYY (generalized anxiety disorder)     RELATED HIGH BLOOD PRESSURE   • GERD (gastroesophageal reflux disease)    • High blood pressure     ANXIETY  RELATED   • Hyperlipidemia    • Hypothyroidism    • Lung cancer    • Lung nodule     LEFT   • Malignant neoplasm of upper-outer quadrant of right breast in female, estrogen receptor positive 4/13/2021    PT STATES HAS BILAT BREAST CANCER   • Migraine    • PONV (postoperative nausea and vomiting)      Past Surgical History:   Procedure Laterality Date   • BREAST BIOPSY Bilateral 04/07/2021    Right Breast 10 o'clock & Left breast 10 o'clock 6 cm from nipple, [facility]   • CLOSED REDUCTION HIP DISLOCATION Left 4/30/2021    Procedure: BRONCHOSCOPY, LEFT VIDEO ASSITED THORACOSCOPY, ROBOTIC ASSSITED MEDIASTINAL LYMPHADENECTOMY WITH INTERCOSTAL NERVE BLOCKS;  Surgeon: Raphael Kerr III, MD;  Location: [facility] MAIN OR;  Service: DaVinci;  Laterality: Left;   • COLONOSCOPY     • EPIDURAL Bilateral 12/28/2022    Procedure: LUMBAR/SACRAL TRANSFORAMINAL EPIDURAL Bilateral L5-S1;  Surgeon: Breann Santiago MD;  Location: [facility] MAIN OR;  Service: Pain Management;  Laterality: Bilateral;   • EPIDURAL Right 2/13/2023    Procedure: LUMBAR/SACRAL TRANSFORAMINAL EPIDURAL Right L5-S1 22302 (Hold eliqus 3 days);  Surgeon: Breann Santiago MD;  Location: [facility] MAIN OR;  Service: Pain Management;  Laterality: Right;   • TUBAL ABDOMINAL LIGATION     • VENOUS ACCESS DEVICE (PORT) INSERTION Right 5/20/2021    Procedure: INSERTION VENOUS ACCESS DEVICE;  Surgeon: Raphael Kerr III, MD;  Location: [facility] MAIN OR;  Service: Thoracic;  Laterality: Right;   • VENOUS ACCESS DEVICE (PORT) INSERTION Right 11/29/2021    Procedure: REVISION AND REPLACEMENT RIGHT SUBCLAVIAN VENOUS ACCESS PORT;  Surgeon: Raphael Kerr III, MD;  Location: [facility] MAIN OR;  Service: Thoracic;  Laterality: Right;   • WRIST SURGERY Right       General Information     Row Name 04/29/23 0914          OT Time and Intention    Document Type evaluation  -CW     Mode of Treatment occupational therapy  -CW     Row Name 04/29/23 0914          General Information    Patient  Profile Reviewed yes  -CW     Prior Level of Function ADL's;min assist:  Occasional assist from spouse for self care.  -CW     Existing Precautions/Restrictions fall  Mets to bone and brain.  -CW     Row Name 04/29/23 0914          Cognition    Orientation Status (Cognition) oriented x 4  -CW     Row Name 04/29/23 0914          Safety Issues, Functional Mobility    Impairments Affecting Function (Mobility) grasp;endurance/activity tolerance;coordination;strength;balance;pain  -CW           User Key  (r) = Recorded By, (t) = Taken By, (c) = Cosigned By    Initials Name Provider Type    CW Leatha Rodriguez OTR Occupational Therapist                 Mobility/ADL's     Row Name 04/29/23 0918          Transfers    Transfers toilet transfer;sit-stand transfer;stand-sit transfer  -     Row Name 04/29/23 0918          Sit-Stand Transfer    Sit-Stand [place] (Transfers) contact guard;verbal cues  -     Row Name 04/29/23 0918          Stand-Sit Transfer    Stand-Sit [place] (Transfers) contact guard;verbal cues  -     Row Name 04/29/23 0918          Toilet Transfer    Type (Toilet Transfer) stand pivot/stand step  -     [place] Level (Toilet Transfer) minimum assist (75% patient effort);verbal cues;nonverbal cues (demo/gesture)  -     Assistive Device (Toilet Transfer) commode, 3-in-1  -     Row Name 04/29/23 0918          Activities of Daily Living    BADL Assessment/Intervention lower body dressing;grooming;feeding  -     Row Name 04/29/23 0918          Lower Body Dressing Assessment/Training    [place] Level (Lower Body Dressing) doff;don;socks;moderate assist (50% patient effort)  -     Position (Lower Body Dressing) edge of bed sitting  -     Row Name 04/29/23 0918          Grooming Assessment/Training    [place] Level (Grooming) wash face, hands;set up;standby assist  -     Position (Grooming) edge of bed sitting  -     Row Name 04/29/23 0918          Self-Feeding  Assessment/Training    Comment, (Feeding) Pt. reports she has difficulty grasping objects on her tray using R hand. Uses L dominant hand to hold utensils.  -CW           User Key  (r) = Recorded By, (t) = Taken By, (c) = Cosigned By    Initials Name Provider Type    CW Leatha Rodriguez OTR Occupational Therapist               Obj/Interventions     Row Name 04/29/23 0921          Sensory Assessment (Somatosensory)    Sensory Assessment (Somatosensory) UE sensation intact  -CW     Row Name 04/29/23 0921          Range of Motion Comprehensive    General Range of Motion bilateral upper extremity ROM WNL  -CW     Comment, General Range of Motion Weakness noted R UE/hand with mild impaired coordination RUE. Hx L elbow pain/cellulitis.  -CW     Row Name 04/29/23 0921          Strength Comprehensive (MMT)    General Manual Muscle Testing (MMT) Assessment upper extremity strength deficits identified  -CW     Comment, General Manual Muscle Testing (MMT) Assessment RUE decreased strength 3-/5 RUE distally. Min  R hand.  -CW     Row Name 04/29/23 0921          Motor Skills    Motor Skills functional endurance  -CW     Functional Endurance fair  -     Row Name 04/29/23 0921          Balance    Balance Assessment sitting static balance;sitting dynamic balance  -CW     Static Sitting Balance supervision  -CW     Dynamic Sitting Balance contact guard  -CW     Position, Sitting Balance sitting edge of bed  -CW     Balance Interventions occupation based/functional task  -CW           User Key  (r) = Recorded By, (t) = Taken By, (c) = Cosigned By    Initials Name Provider Type    CW Leatha Rodriguez OTR Occupational Therapist               Goals/Plan     Row Name 04/29/23 0938          Transfer Goal 1 (OT)    Activity/Assistive Device (Transfer Goal 1, OT) sit-to-stand/stand-to-sit;bed-to-chair/chair-to-bed;toilet;commode, 3-in-1;walker, rolling  -CW     [place] Level/Cues Needed (Transfer Goal 1, OT) modified independence  -CW      Time Frame (Transfer Goal 1, OT) 2 weeks;by discharge  -CW     Row Name 04/29/23 0938          Dressing Goal 1 (OT)    Activity/Device (Dressing Goal 1, OT) dressing skills, all;upper body dressing;lower body dressing  -CW     [place]/Cues Needed (Dressing Goal 1, OT) set-up required;supervision required  -CW     Time Frame (Dressing Goal 1, OT) by discharge;2 weeks  -CW     Row Name 04/29/23 0938          Toileting Goal 1 (OT)    Activity/Device (Toileting Goal 1, OT) toileting skills, all;adjust/manage clothing;perform perineal hygiene;commode, 3-in-1  -CW     [place] Level/Cues Needed (Toileting Goal 1, OT) modified independence  -CW     Time Frame (Toileting Goal 1, OT) by discharge;2 weeks  -CW     Row Name 04/29/23 0938          Therapy Assessment/Plan (OT)    Planned Therapy Interventions (OT) activity tolerance training;BADL retraining;patient/caregiver education/training;transfer/mobility retraining;neuromuscular control/coordination retraining;strengthening exercise  -CW           User Key  (r) = Recorded By, (t) = Taken By, (c) = Cosigned By    Initials Name Provider Type    CW Leatha Rodriguez OTR Occupational Therapist               Clinical Impression     Row Name 04/29/23 0926          Pain Assessment    Pretreatment Pain Rating 3/10  -CW     Posttreatment Pain Rating 3/10  -CW     Pain Location - Side/Orientation Bilateral  -CW     Pain Location - foot  -CW     Pain Intervention(s) Repositioned  -CW     Row Name 04/29/23 0926          Plan of Care Review    Plan of Care Reviewed With patient;spouse  -CW     Outcome Evaluation OT-Pt. admitted for abnormal finding on MRI, acute CVA. Hx metastatic lung CA with mets to bone and brain. PLOF pt. received occasional assist for self care from spouse.Reports she stayed mostly in bed. Pt. has a BCS, Rwx, w/c, shower chair at home. Pt. presents with RUE weakness, min impaired coordination R UE/hand, decreased R , decreased functional endurance,  balance, strength, ADL performance. LE dressing mod A shoes seated EOB. BSC transfer min A with cues.Pt. states toileting is difficult. Pt. has difficulty to grasp and hold objects R hand. Is able to feed self using L hand to hold utensils. Pt. may benefit from OT to address functional deficits. Anticipate D/c to home with HH as needed.  -CW     Row Name 04/29/23 0926          Therapy Assessment/Plan (OT)    Therapy Frequency (OT) 5 times/wk  -CW     Row Name 04/29/23 0926          Positioning and Restraints    Pre-Treatment Position in bed  -CW     Post Treatment Position bed  -CW     In Bed exit alarm on;with family/caregiver;encouraged to call for assist;call light within reach;sitting EOB  -CW           User Key  (r) = Recorded By, (t) = Taken By, (c) = Cosigned By    Initials Name Provider Type    CW Leatha Rodriguez OTR Occupational Therapist               Outcome Measures     Row Name 04/29/23 0939          How much help from another is currently needed...    Putting on and taking off regular lower body clothing? 2  -CW     Bathing (including washing, rinsing, and drying) 2  -CW     Toileting (which includes using toilet bed pan or urinal) 2  -CW     Putting on and taking off regular upper body clothing 3  -CW     Taking care of personal grooming (such as brushing teeth) 3  -CW     Eating meals 3  -CW     AM-PAC 6 Clicks Score (OT) 15  -CW     Row Name 04/29/23 0700          How much help from another person do you currently need...    Turning from your back to your side while in flat bed without using bedrails? 4  -HW     Moving from lying on back to sitting on the side of a flat bed without bedrails? 3  -HW     Moving to and from a bed to a chair (including a wheelchair)? 3  -HW     Standing up from a chair using your arms (e.g., wheelchair, bedside chair)? 3  -HW     Climbing 3-5 steps with a railing? 2  -HW     To walk in hospital room? 2  -HW     AM-PAC 6 Clicks Score (PT) 17  -HW     Highest level of  mobility 5 --> Static standing  -     Row Name 04/29/23 0941          Modified [place] Scale    Modified [place] Scale 4 - Moderately severe disability.  Unable to walk without assistance, and unable to attend to own bodily needs without assistance.  -     Row Name 04/29/23 0941 04/29/23 0939       Functional Assessment    Outcome Measure Options Modified [place]  -CW AM-PAC 6 Clicks Daily Activity (OT)  -          User Key  (r) = Recorded By, (t) = Taken By, (c) = Cosigned By    Initials Name Provider Type    CW Leatha Rodriguez OTR Occupational Therapist     Marga Leung, RN Registered Nurse                Occupational Therapy Education     Title: PT OT SLP Therapies (Done)     Topic: Occupational Therapy (Done)     Point: ADL training (Done)     Description:   Instruct learner(s) on proper safety adaptation and remediation techniques during self care or transfers.   Instruct in proper use of assistive devices.              Learning Progress Summary           Patient Acceptance, E, VU by  at 4/29/2023 0940    Comment: Role of OT and poc.   Significant Other Acceptance, E, VU by  at 4/29/2023 0940    Comment: Role of OT and poc.                               User Key     Initials Effective Dates Name Provider Type Discipline     06/16/21 -  Leatha Rodriguez OTR Occupational Therapist OT              OT Recommendation and Plan  Planned Therapy Interventions (OT): activity tolerance training, BADL retraining, patient/caregiver education/training, transfer/mobility retraining, neuromuscular control/coordination retraining, strengthening exercise  Therapy Frequency (OT): 5 times/wk  Plan of Care Review  Plan of Care Reviewed With: patient, spouse  Outcome Evaluation: OT-Pt. admitted for abnormal finding on MRI, acute CVA. Hx metastatic lung CA with mets to bone and brain. PLOF pt. received occasional assist for self care from spouse.Reports she stayed mostly in bed. Pt. has a BCS, Rwx, w/c, shower chair at  home. Pt. presents with RUE weakness, min impaired coordination R UE/hand, decreased R , decreased functional endurance, balance, strength, ADL performance. LE dressing mod A shoes seated EOB. BSC transfer min A with cues.Pt. states toileting is difficult. Pt. has difficulty to grasp and hold objects R hand. Is able to feed self using L hand to hold utensils. Pt. may benefit from OT to address functional deficits. Anticipate D/c to home with HH as needed.     Time Calculation:    Time Calculation- OT     Row Name 04/29/23 0942             Time Calculation- OT    OT Start Time 0853  -CW      OT Stop Time 0913  -CW      OT Time Calculation (min) 20 min  -CW      Total Timed Code Minutes- OT 8 minute(s)  -CW      OT Received On 04/29/23  -CW      OT Goal Re-Cert Due Date 06/03/23  -CW         Timed Charges    25035 - OT Self Care/Mgmt Minutes 8  -CW         Total Minutes    Timed Charges Total Minutes 8  -CW       Total Minutes 8  -CW            User Key  (r) = Recorded By, (t) = Taken By, (c) = Cosigned By    Initials Name Provider Type    CW Leatha Rodriguez OTR Occupational Therapist              Therapy Charges for Today     Code Description Service Date Service Provider Modifiers Qty    07737027461 HC OT SELF CARE/MGMT/TRAIN EA 15 MIN 4/29/2023 Leatha Rodriguez OTR GO 1    50029226975 HC OT EVAL MOD COMPLEXITY 1 4/29/2023 Leatha Rodriguez OTR GO 1               IRAM Rossi  4/29/2023

## 2023-04-29 NOTE — PLAN OF CARE
Problem: Adult Inpatient Plan of Care  Goal: Plan of Care Review  Outcome: Ongoing, Progressing  Goal: Patient-Specific Goal (Individualized)  Outcome: Ongoing, Progressing  Goal: Absence of Hospital-Acquired Illness or Injury  Outcome: Ongoing, Progressing  Goal: Optimal Comfort and Wellbeing  Outcome: Ongoing, Progressing  Goal: Readiness for Transition of Care  Outcome: Ongoing, Progressing     Problem: Fall Injury Risk  Goal: Absence of Fall and Fall-Related Injury  Outcome: Ongoing, Progressing     Problem: Skin Injury Risk Increased  Goal: Skin Health and Integrity  Outcome: Ongoing, Progressing     Problem: Cerebral Tissue Perfusion (Stroke, Ischemic/Transient Ischemic Attack)  Goal: Optimal Cerebral Tissue Perfusion  Outcome: Ongoing, Progressing     Problem: Urinary Elimination Impaired (Stroke, Ischemic/Transient Ischemic Attack)  Goal: Effective Urinary Elimination  Outcome: Ongoing, Progressing   Goal Outcome Evaluation:

## 2023-04-29 NOTE — CONSULTS
Referring Provider: Chris Miles MD  Reason for Consultation: Left olecranon septic bursitis      Subjective   History of present illness: Patient is a 57-year-old female with metastatic non-small cell lung cancer on steroids and Taxotere who presents in the setting of acute CVA.  ID consulted for left olecranon septic bursitis.    Patient was recently seen by orthopedics on the 24th with aspiration of the left olecranon bursa that grew MSSA and placed on Bactrim.  She was scheduled 2-week follow-up with orthopedics however was admitted in the setting of sudden onset of weakness in the right arm and leg.  MRI brain was performed with evidence of diffuse metastasis in the brain as well as multiple new areas of acute infarct on the left side.  Neurology has been consulted and planning further vascular imaging.  TTE has been ordered.  She has been placed on Keflex targeting MSSA from olecranon bursitis.    Patient reports that the elbow today is about the same as it has been.  She has not noticed any change with antibiotics.  Denies any fevers or chills.  Does report difficulty using her left hand.  Denies any nausea, vomiting, diarrhea.  Denies any pain at her port site.  Denies redness at her port site.    Past Medical History:   Diagnosis Date   • Anxiety    • Clot     POSSIBLE BLOOD CLOT IN VENOUS ACCESS DEVICE-PT STATES WAS STARTED ON ELIQUIS    • Dyspnea on exertion    • Elevated cholesterol    • Frequent urination     DAY AND NIGHT   • SHAYY (generalized anxiety disorder)     RELATED HIGH BLOOD PRESSURE   • GERD (gastroesophageal reflux disease)    • High blood pressure     ANXIETY RELATED   • Hyperlipidemia    • Hypothyroidism    • Lung cancer    • Lung nodule     LEFT   • Malignant neoplasm of upper-outer quadrant of right breast in female, estrogen receptor positive 4/13/2021    PT STATES HAS BILAT BREAST CANCER   • Migraine    • PONV (postoperative nausea and vomiting)        Past Surgical History:   Procedure  Laterality Date   • BREAST BIOPSY Bilateral 04/07/2021    Right Breast 10 o'clock & Left breast 10 o'clock 6 cm from nipple, [facility]   • CLOSED REDUCTION HIP DISLOCATION Left 4/30/2021    Procedure: BRONCHOSCOPY, LEFT VIDEO ASSITED THORACOSCOPY, ROBOTIC ASSSITED MEDIASTINAL LYMPHADENECTOMY WITH INTERCOSTAL NERVE BLOCKS;  Surgeon: Raphael Kerr III, MD;  Location: [facility] OR;  Service: DaVinci;  Laterality: Left;   • COLONOSCOPY     • EPIDURAL Bilateral 12/28/2022    Procedure: LUMBAR/SACRAL TRANSFORAMINAL EPIDURAL Bilateral L5-S1;  Surgeon: Breann Santiago MD;  Location: [facility] MAIN OR;  Service: Pain Management;  Laterality: Bilateral;   • EPIDURAL Right 2/13/2023    Procedure: LUMBAR/SACRAL TRANSFORAMINAL EPIDURAL Right L5-S1 19159 (Hold eliqus 3 days);  Surgeon: Breann Santiago MD;  Location: [facility] MAIN OR;  Service: Pain Management;  Laterality: Right;   • TUBAL ABDOMINAL LIGATION     • VENOUS ACCESS DEVICE (PORT) INSERTION Right 5/20/2021    Procedure: INSERTION VENOUS ACCESS DEVICE;  Surgeon: Raphael Kerr III, MD;  Location: [facility] OR;  Service: Thoracic;  Laterality: Right;   • VENOUS ACCESS DEVICE (PORT) INSERTION Right 11/29/2021    Procedure: REVISION AND REPLACEMENT RIGHT SUBCLAVIAN VENOUS ACCESS PORT;  Surgeon: Raphael Kerr III, MD;  Location: [facility] OR;  Service: Thoracic;  Laterality: Right;   • WRIST SURGERY Right        family history includes Alcohol abuse in her brother; Cancer in her father; Colon cancer in her maternal grandmother; Lymphoma in her father; Prostate cancer in her father.     reports that she quit smoking about 2 years ago. Her smoking use included electronic cigarette and cigarettes. She started smoking about 42 years ago. She has a 30.00 pack-year smoking history. She has never used smokeless tobacco. She reports that she does not drink alcohol and does not use drugs.     No Known Allergies    Medication:  Antibiotics:  Anti-Infectives (From admission,  onward)    Ordered     Dose/Rate Route Frequency Start Stop    04/28/23 4844  cephalexin (KEFLEX) capsule 500 mg        Ordering Provider: Chris Miles MD    500 mg Oral Every 8 Hours Scheduled 04/28/23 1545 05/08/23 1359            Objective     Physical Exam:   Vital Signs   Temp:  [97.7 °F (36.5 °C)-98.3 °F (36.8 °C)] 98.3 °F (36.8 °C)  Heart Rate:  [] 86  Resp:  [18-20] 20  BP: (122-138)/() 138/90    GENERAL: Awake and alert, in no acute distress.   HEENT: Oropharynx is clear. Hearing is grossly normal.   EYES: PERRL. No conjunctival injection. No lid lag.   HEART: Regular rate and regular rhythm. No peripheral edema.   LUNGS: Normal work of breathing  GI: Soft, nontender, nondistended.   SKIN: Upper extremities with areas of ecchymoses.  Left elbow with erythema and swelling.  No purulence.  PSYCHIATRIC: Appropriate mood, affect, insight, and judgment.     Results Review:   I reviewed the patient's new clinical results.  I reviewed the patient's new imaging results and agree with the interpretation.  I reviewed the patient's other test results and agree with the interpretation    Lab Results   Component Value Date    WBC 21.93 (H) 04/29/2023    HGB 7.8 (L) 04/29/2023    HCT 24.2 (L) 04/29/2023    MCV 91.7 04/29/2023     (L) 04/29/2023       No results found for: CLAUDIO BOURNE VANCORANDOM    Lab Results   Component Value Date    GLUCOSE 82 04/29/2023    BUN 31 (H) 04/29/2023    CREATININE 1.01 (H) 04/29/2023    EGFRIFNONA 46 (L) 02/14/2022    EGFRIFAFRI 95 11/24/2020    BCR 30.7 (H) 04/29/2023    CO2 23.0 04/29/2023    CALCIUM 7.7 (L) 04/29/2023    PROTENTOTREF 6.7 08/02/2022    ALBUMIN 3.6 04/29/2023    LABIL2 0.8 08/02/2022    AST 9 04/29/2023    ALT 12 04/29/2023         Estimated Creatinine Clearance: 61.1 mL/min (A) (by C-G formula based on SCr of 1.01 mg/dL (H)).      Microbiology:  4/24 left elbow fluid culture with MSSA    Radiology:  4/28 MRI of the brain report reviewed  with multiple areas of left-sided acute infarcts and multiple areas of metastatic lesions.    Assessment   #Left olecranon septic bursitis with MSSA  #Left middle cerebral artery stroke  #CKD stage III  #Metastatic non-small cell lung cancer   #Chronic steroid use  #History of breast cancer  #Brain metastasis  #Recent DVT    Agree with Keflex however while inpatient we will switch to cefazolin 2 g every 8 hours while awaiting blood cultures.  Given her acute embolic stroke it seems reasonable to work-up for endocarditis with blood cultures today.  Patient is resistant to having to peripheral so we will do 1 from the port and one peripherally.  Agree with TTE.  We will follow these up peripherally and continue on cefazolin for now for a planned 14-day course that can be completed with Keflex 500 mg every 6 hours on discharge if blood cultures and TTE are negative.  She has scheduled follow-up with orthopedics for olecranon bursitis.    Thank you for this consult.  We will continue to follow along and tailor antibiotics as the patient's clinical course evolves.

## 2023-04-29 NOTE — PLAN OF CARE
Goal Outcome Evaluation:      No new neuro symptoms nor acute events this shift.           Problem: Adult Inpatient Plan of Care  Goal: Plan of Care Review  Outcome: Ongoing, Progressing  Goal: Patient-Specific Goal (Individualized)  Outcome: Ongoing, Progressing  Goal: Absence of Hospital-Acquired Illness or Injury  Outcome: Ongoing, Progressing  Intervention: Identify and Manage Fall Risk  Recent Flowsheet Documentation  Taken 4/29/2023 0615 by Sammy Schulte RN  Safety Promotion/Fall Prevention:   safety round/check completed   room organization consistent   lighting adjusted   clutter free environment maintained  Taken 4/29/2023 0215 by Sammy Schulte RN  Safety Promotion/Fall Prevention:   safety round/check completed   clutter free environment maintained  Taken 4/29/2023 0015 by Sammy Schulte RN  Safety Promotion/Fall Prevention:   safety round/check completed   room organization consistent   lighting adjusted   clutter free environment maintained  Taken 4/28/2023 2210 by Sammy Schulte RN  Safety Promotion/Fall Prevention: safety round/check completed  Taken 4/28/2023 2000 by Sammy Schulte RN  Safety Promotion/Fall Prevention:   activity supervised   assistive device/personal items within reach   clutter free environment maintained   lighting adjusted   safety round/check completed  Intervention: Prevent Skin Injury  Recent Flowsheet Documentation  Taken 4/28/2023 2100 by Sammy Schulte RN  Skin Protection:   adhesive use limited   tubing/devices free from skin contact  Intervention: Prevent and Manage VTE (Venous Thromboembolism) Risk  Recent Flowsheet Documentation  Taken 4/28/2023 2100 by Sammy Schulte RN  Activity Management: activity encouraged  VTE Prevention/Management:   sequential compression devices off   patient refused intervention  Intervention: Prevent Infection  Recent Flowsheet Documentation  Taken 4/29/2023 0615 by Sammy Schulte RN  Infection Prevention:  rest/sleep promoted  Taken 4/29/2023 0215 by Sammy Schulte RN  Infection Prevention: rest/sleep promoted  Taken 4/29/2023 0015 by Sammy Schulte RN  Infection Prevention:   rest/sleep promoted   environmental surveillance performed  Taken 4/28/2023 2210 by Sammy Schulte RN  Infection Prevention: rest/sleep promoted  Taken 4/28/2023 2100 by Sammy Schulte RN  Infection Prevention: rest/sleep promoted  Goal: Optimal Comfort and Wellbeing  Outcome: Ongoing, Progressing  Intervention: Monitor Pain and Promote Comfort  Recent Flowsheet Documentation  Taken 4/28/2023 2211 by Sammy Schulte RN  Pain Management Interventions: see MAR  Intervention: Provide Person-Centered Care  Recent Flowsheet Documentation  Taken 4/28/2023 2100 by Sammy Schulte RN  Trust Relationship/Rapport: care explained  Goal: Readiness for Transition of Care  Outcome: Ongoing, Progressing     Problem: Fall Injury Risk  Goal: Absence of Fall and Fall-Related Injury  Outcome: Ongoing, Progressing  Intervention: Identify and Manage Contributors  Recent Flowsheet Documentation  Taken 4/29/2023 0615 by Sammy Schulte RN  Medication Review/Management: medications reviewed  Taken 4/29/2023 0215 by Sammy Schulte RN  Medication Review/Management: medications reviewed  Taken 4/29/2023 0015 by Sammy Schulte RN  Medication Review/Management: medications reviewed  Taken 4/28/2023 2210 by Sammy Schulte RN  Medication Review/Management: medications reviewed  Taken 4/28/2023 2100 by Sammy Schulte RN  Medication Review/Management: medications reviewed  Taken 4/28/2023 2000 by Sammy Schulte RN  Medication Review/Management: medications reviewed  Intervention: Promote Injury-Free Environment  Recent Flowsheet Documentation  Taken 4/29/2023 0615 by Sammy Schulte RN  Safety Promotion/Fall Prevention:   safety round/check completed   room organization consistent   lighting adjusted   clutter free environment  maintained  Taken 4/29/2023 0215 by Sammy Schulte RN  Safety Promotion/Fall Prevention:   safety round/check completed   clutter free environment maintained  Taken 4/29/2023 0015 by Sammy Schulte RN  Safety Promotion/Fall Prevention:   safety round/check completed   room organization consistent   lighting adjusted   clutter free environment maintained  Taken 4/28/2023 2210 by Sammy Schulte RN  Safety Promotion/Fall Prevention: safety round/check completed  Taken 4/28/2023 2000 by Sammy Schulte RN  Safety Promotion/Fall Prevention:   activity supervised   assistive device/personal items within reach   clutter free environment maintained   lighting adjusted   safety round/check completed     Problem: Skin Injury Risk Increased  Goal: Skin Health and Integrity  Outcome: Ongoing, Progressing  Intervention: Optimize Skin Protection  Recent Flowsheet Documentation  Taken 4/28/2023 2100 by Sammy Schulte RN  Pressure Reduction Techniques:   weight shift assistance provided   frequent weight shift encouraged  Pressure Reduction Devices:   alternating pressure pump (ADD)   positioning supports utilized  Skin Protection:   adhesive use limited   tubing/devices free from skin contact     Problem: Cerebral Tissue Perfusion (Stroke, Ischemic/Transient Ischemic Attack)  Goal: Optimal Cerebral Tissue Perfusion  Outcome: Ongoing, Progressing     Problem: Functional Ability Impaired (Stroke, Ischemic/Transient Ischemic Attack)  Goal: Optimal Functional Ability  Outcome: Ongoing, Progressing  Intervention: Optimize Functional Ability  Recent Flowsheet Documentation  Taken 4/28/2023 2100 by Sammy Schulte RN  Activity Management: activity encouraged     Problem: Sensorimotor Impairment (Stroke, Ischemic/Transient Ischemic Attack)  Goal: Improved Sensorimotor Function  Outcome: Ongoing, Progressing  Intervention: Optimize Sensory and Perceptual Ability  Recent Flowsheet Documentation  Taken 4/28/2023 2100 by  Sammy Schulte, RN  Pressure Reduction Techniques:   weight shift assistance provided   frequent weight shift encouraged  Pressure Reduction Devices:   alternating pressure pump (ADD)   positioning supports utilized     Problem: Urinary Elimination Impaired (Stroke, Ischemic/Transient Ischemic Attack)  Goal: Effective Urinary Elimination  Outcome: Ongoing, Progressing

## 2023-04-29 NOTE — THERAPY EVALUATION
Patient Name: Holli Patel  : 1965    MRN: [id number]                              Today's Date: 2023       Admit Date: 2023    Visit Dx: No diagnosis found.  Patient Active Problem List   Diagnosis   • History of gastroesophageal reflux (GERD)   • Primary insomnia   • Mixed hyperlipidemia   • Other specified hypothyroidism   • Anxiety   • History of migraine   • Essential hypertension   • History of colon polyps   • Diverticulosis   • Personal history of tobacco use   • Osteopenia   • Dyspnea   • Malignant neoplasm of upper-outer quadrant of right breast in female, estrogen receptor positive   • Malignant neoplasm of upper-inner quadrant of left breast in female, estrogen receptor positive   • Primary adenocarcinoma of upper lobe of left lung (HCC)   • Encounter for fitting and adjustment of vascular catheter   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   • 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   • History of bilateral breast cancer   • Stage 3a chronic kidney disease   • Chemotherapy-induced neutropenia   • Metabolic acidosis   • Ataxia   • Bony metastasis   • Hypopotassemia   • Hypophosphatemia   • Hypomagnesemia   • Cancer related pain   • Polyneuropathy   • Altered sensorium   • Lumbar radiculopathy   • Lumbar foraminal stenosis   • Deep venous thrombosis   • Benign hypertension with chronic kidney disease   • OAB (overactive bladder)   • Metastasis to brain   • Abnormal MRI of head   • MSSA infection, non-invasive   • Olecranon bursa abscess, left   • Abnormal finding on MRI of brain     Past Medical History:   Diagnosis Date   • Anxiety    • Clot     POSSIBLE BLOOD CLOT IN VENOUS ACCESS DEVICE-PT STATES WAS STARTED ON ELIQUIS    • Dyspnea on exertion    • Elevated cholesterol    • Frequent urination     DAY AND NIGHT   • SHAYY (generalized anxiety disorder)     RELATED HIGH BLOOD PRESSURE   • GERD (gastroesophageal reflux disease)    • High blood pressure     ANXIETY  RELATED   • Hyperlipidemia    • Hypothyroidism    • Lung cancer    • Lung nodule     LEFT   • Malignant neoplasm of upper-outer quadrant of right breast in female, estrogen receptor positive 4/13/2021    PT STATES HAS BILAT BREAST CANCER   • Migraine    • PONV (postoperative nausea and vomiting)      Past Surgical History:   Procedure Laterality Date   • BREAST BIOPSY Bilateral 04/07/2021    Right Breast 10 o'clock & Left breast 10 o'clock 6 cm from nipple, [facility]   • CLOSED REDUCTION HIP DISLOCATION Left 4/30/2021    Procedure: BRONCHOSCOPY, LEFT VIDEO ASSITED THORACOSCOPY, ROBOTIC ASSSITED MEDIASTINAL LYMPHADENECTOMY WITH INTERCOSTAL NERVE BLOCKS;  Surgeon: Raphael Kerr III, MD;  Location: [facility] MAIN OR;  Service: DaVinci;  Laterality: Left;   • COLONOSCOPY     • EPIDURAL Bilateral 12/28/2022    Procedure: LUMBAR/SACRAL TRANSFORAMINAL EPIDURAL Bilateral L5-S1;  Surgeon: Breann Santiago MD;  Location: [facility] MAIN OR;  Service: Pain Management;  Laterality: Bilateral;   • EPIDURAL Right 2/13/2023    Procedure: LUMBAR/SACRAL TRANSFORAMINAL EPIDURAL Right L5-S1 33727 (Hold eliqus 3 days);  Surgeon: Breann Santiago MD;  Location: [facility] MA[facility] OR;  Service: Pain Management;  Laterality: Right;   • TUBAL ABDOMINAL LIGATION     • VENOUS ACCESS DEVICE (PORT) INSERTION Right 5/20/2021    Procedure: INSERTION VENOUS ACCESS DEVICE;  Surgeon: Raphael Kerr III, MD;  Location: [facility] MAIN OR;  Service: Thoracic;  Laterality: Right;   • VENOUS ACCESS DEVICE (PORT) INSERTION Right 11/29/2021    Procedure: REVISION AND REPLACEMENT RIGHT SUBCLAVIAN VENOUS ACCESS PORT;  Surgeon: Raphael Kerr III, MD;  Location: [facility] MAIN OR;  Service: Thoracic;  Laterality: Right;   • WRIST SURGERY Right       General Information     Row Name 04/29/23 1506          Physical Therapy Time and Intention    Document Type evaluation  -DB     Mode of Treatment individual therapy;physical therapy  -DB     Row Name 04/29/23 1509           General Information    Patient Profile Reviewed yes  -DB     Existing Precautions/Restrictions fall  -DB     Barriers to Rehab medically complex  -DB     Row Name 04/29/23 1506          Living Environment    People in Home spouse  -DB     Row Name 04/29/23 1506          Home Main Entrance    Number of Stairs, Main Entrance none  -DB     Row Name 04/29/23 1506          Stairs Within Home, Primary    Number of Stairs, Within Home, Primary none  -DB     Row Name 04/29/23 1506          Cognition    Orientation Status (Cognition) oriented x 3  -DB     Row Name 04/29/23 1506          Safety Issues, Functional Mobility    Impairments Affecting Function (Mobility) endurance/activity tolerance;strength;balance;pain;cognition  -DB     Cognitive Impairments, Mobility Safety/Performance insight into deficits/self-awareness;problem-solving/reasoning  -DB           User Key  (r) = Recorded By, (t) = Taken By, (c) = Cosigned By    Initials Name Provider Type    DB Eloisa Castillo PT Physical Therapist               Mobility     Row Name 04/29/23 1523          Bed Mobility    Bed Mobility supine-sit;sit-supine  -DB     Supine-Sit [place] (Bed Mobility) standby assist  -DB     Sit-Supine [place] (Bed Mobility) standby assist  -DB     Assistive Device (Bed Mobility) bed rails;head of bed elevated  -DB     Row Name 04/29/23 1523          Bed-Chair Transfer    Bed-Chair [place] (Transfers) contact guard;verbal cues  -DB     Comment, (Bed-Chair Transfer) transfer to C and back to bed  -DB     Row Name 04/29/23 1523          Sit-Stand Transfer    Sit-Stand [place] (Transfers) contact guard;verbal cues  -DB           User Key  (r) = Recorded By, (t) = Taken By, (c) = Cosigned By    Initials Name Provider Type    DB Eloisa Castillo PT Physical Therapist               Obj/Interventions     Row Name 04/29/23 1525          Range of Motion Comprehensive    General Range of Motion no range of motion deficits identified   -DB     Row Name 04/29/23 1525          Strength Comprehensive (MMT)    Comment, General Manual Muscle Testing (MMT) Assessment generalized weakness, L > R  -DB     Row Name 04/29/23 1525          Balance    Balance Assessment sitting static balance;sitting dynamic balance;standing static balance;standing dynamic balance  -DB     Static Sitting Balance standby assist  -DB     Dynamic Sitting Balance standby assist  -DB     Position, Sitting Balance unsupported;sitting edge of bed  -DB     Static Standing Balance contact guard  -DB     Dynamic Standing Balance contact guard  -DB     Position/Device Used, Standing Balance unsupported  -DB     Balance Interventions sitting;standing;sit to stand  -DB           User Key  (r) = Recorded By, (t) = Taken By, (c) = Cosigned By    Initials Name Provider Type    DB Eloisa Castillo, PT Physical Therapist               Goals/Plan     Row Name 04/29/23 1537          Bed Mobility Goal 1 (PT)    Activity/Assistive Device (Bed Mobility Goal 1, PT) bed mobility activities, all  -DB     [place] Level/Cues Needed (Bed Mobility Goal 1, PT) modified independence  -DB     Time Frame (Bed Mobility Goal 1, PT) 1 week  -DB     Row Name 04/29/23 1537          Transfer Goal 1 (PT)    Activity/Assistive Device (Transfer Goal 1, PT) transfers, all  -DB     [place] Level/Cues Needed (Transfer Goal 1, PT) modified independence  -DB     Time Frame (Transfer Goal 1, PT) 1 week  -DB     Row Name 04/29/23 1537          Gait Training Goal 1 (PT)    Activity/Assistive Device (Gait Training Goal 1, PT) gait (walking locomotion)  -DB     [place] Level (Gait Training Goal 1, PT) contact guard required  -DB     Distance (Gait Training Goal 1, PT) 15'  -DB     Time Frame (Gait Training Goal 1, PT) 1 week  -DB     Row Name 04/29/23 1537          Therapy Assessment/Plan (PT)    Planned Therapy Interventions (PT) balance training;bed mobility training;gait training;home exercise  program;neuromuscular re-education;postural re-education;transfer training;stair training;strengthening;patient/family education  -DB           User Key  (r) = Recorded By, (t) = Taken By, (c) = Cosigned By    Initials Name Provider Type    Eloisa Turner, PT Physical Therapist               Clinical Impression     Row Name 04/29/23 1530          Pain    Pain Intervention(s) Ambulation/increased activity;Repositioned  -DB     Row Name 04/29/23 1530          Plan of Care Review    Plan of Care Reviewed With patient  -DB     Outcome Evaluation Pt is a 56 y/o F admitted to [facility] for acute L CVA. PMH includes lung CA w mets to bone and brain. Pt lives at home with her  and states she uses W/C for mobility. She normally is able to transfer herself, but states she has not been able to walk in 8 months, although cannot tell PT why. PT notes from October 2022, pt was ambulating with RW and CGA. MMT revealed inc'd weakness on L side, inconsistent with acute CVA symptoms. She was able to perform bed mobility with SV and stands to transfer over to [facility] with CGA. Declines further mobility and sitting UIC today. Pt seems to demo self limiting behavior. She demo's dec'd functional mobility and endurance and will benefit from skilled PT intervention. Pt safe to D/C home with assist when stable.  -DB     Row Name 04/29/23 1530          Therapy Assessment/Plan (PT)    Rehab Potential (PT) good, to achieve stated therapy goals  -DB     Criteria for Skilled Interventions Met (PT) yes  -DB     Therapy Frequency (PT) 5 times/wk  -DB     Row Name 04/29/23 2130          Vital Signs    O2 Delivery Pre Treatment room air  -DB     O2 Delivery Intra Treatment room air  -DB     O2 Delivery Post Treatment room air  -DB     Pre Patient Position Supine  -DB     Intra Patient Position Standing  -DB     Post Patient Position Supine  -DB     Row Name 04/29/23 2680          Positioning and Restraints    Pre-Treatment Position in bed  -DB      Post Treatment Position bed  -DB     In Bed supine;call light within reach;encouraged to call for assist;exit alarm on  -DB           User Key  (r) = Recorded By, (t) = Taken By, (c) = Cosigned By    Initials Name Provider Type    Eloisa Turner, PT Physical Therapist               Outcome Measures     Row Name 04/29/23 1538 04/29/23 0700       How much help from another person do you currently need...    Turning from your back to your side while in flat bed without using bedrails? 4  -DB 4  -HW    Moving from lying on back to sitting on the side of a flat bed without bedrails? 4  -DB 3  -HW    Moving to and from a bed to a chair (including a wheelchair)? 3  -DB 3  -HW    Standing up from a chair using your arms (e.g., wheelchair, bedside chair)? 3  -DB 3  -HW    Climbing 3-5 steps with a railing? 2  -DB 2  -HW    To walk in hospital room? 2  -DB 2  -HW    AM-PAC 6 Clicks Score (PT) 18  -DB 17  -HW    Highest level of mobility 6 --> Walked 10 steps or more  -DB 5 --> Static standing  -HW    Row Name 04/29/23 0941          Modified [place] Scale    Modified [place] Scale 4 - Moderately severe disability.  Unable to walk without assistance, and unable to attend to own bodily needs without assistance.  -CW     Row Name 04/29/23 1538 04/29/23 0941       Functional Assessment    Outcome Measure Options AM-PAC 6 Clicks Basic Mobility (PT)  -DB Modified [place]  -CW    Row Name 04/29/23 0939          Functional Assessment    Outcome Measure Options AM-PAC 6 Clicks Daily Activity (OT)  -CW           User Key  (r) = Recorded By, (t) = Taken By, (c) = Cosigned By    Initials Name Provider Type    Leatha Dove OTR Occupational Therapist    Eloisa Turner, [facility] Physical Therapist    HW Mraga Leung, RN Registered Nurse                             Physical Therapy Education     Title: PT OT SLP Therapies (Done)     Topic: Physical Therapy (Done)     Point: Mobility training (Done)     Learning Progress Summary            Patient Acceptance, E, VU by DB at 4/29/2023 1539                   Point: Home exercise program (Done)     Learning Progress Summary           Patient Acceptance, E, VU by DB at 4/29/2023 1539                   Point: Body mechanics (Done)     Learning Progress Summary           Patient Acceptance, E, VU by DB at 4/29/2023 1539                   Point: Precautions (Done)     Learning Progress Summary           Patient Acceptance, E, VU by DB at 4/29/2023 1539                               User Key     Initials Effective Dates Name Provider Type Discipline     06/16/21 -  Eloisa Castillo, PT Physical Therapist PT              PT Recommendation and Plan  Planned Therapy Interventions (PT): balance training, bed mobility training, gait training, home exercise program, neuromuscular re-education, postural re-education, transfer training, stair training, strengthening, patient/family education  Plan of Care Reviewed With: patient  Outcome Evaluation: Pt is a 56 y/o F admitted to [facility] for acute L CVA. PMH includes lung CA w mets to bone and brain. Pt lives at home with her  and states she uses W/C for mobility. She normally is able to transfer herself, but states she has not been able to walk in 8 months, although cannot tell PT why. PT notes from October 2022, pt was ambulating with RW and CGA. MMT revealed inc'd weakness on L side, inconsistent with acute CVA symptoms. She was able to perform bed mobility with SV and stands to transfer over to [facility] with CGA. Declines further mobility and sitting UIC today. Pt seems to demo self limiting behavior. She demo's dec'd functional mobility and endurance and will benefit from skilled PT intervention. Pt safe to D/C home with assist when stable.     Time Calculation:    PT Charges     Row Name 04/29/23 1540             Time Calculation    Start Time 1315  -DB      Stop Time 1332  -DB      Time Calculation (min) 17 min  -DB      PT Received On 04/29/23  -DB      PT -  Next Appointment 05/01/23  -DB      PT Goal Re-Cert Due Date 05/06/23  -DB         Time Calculation- PT    Total Timed Code Minutes- PT 8 minute(s)  -DB            User Key  (r) = Recorded By, (t) = Taken By, (c) = Cosigned By    Initials Name Provider Type    Eloisa Turner, PT Physical Therapist              Therapy Charges for Today     Code Description Service Date Service Provider Modifiers Qty    [id number]  PT EVAL MOD COMPLEXITY 3 4/29/2023 Eloisa Castillo, PT GP 1    [id number]  PT THERAPEUTIC ACT EA 15 MIN 4/29/2023 Eloisa Castillo, PT GP 1          PT G-Codes  Outcome Measure Options: AM-PAC 6 Clicks Basic Mobility (PT)  AM-PAC 6 Clicks Score (PT): 18  AM-PAC 6 Clicks Score (OT): 15  Modified [place] Scale: 4 - Moderately severe disability.  Unable to walk without assistance, and unable to attend to own bodily needs without assistance.  PT Discharge Summary  Anticipated Discharge Disposition (PT): home with 24/7 care, home with home health, home with outpatient therapy services    Eloisa Castillo, WESLEY  4/29/2023

## 2023-04-29 NOTE — PROGRESS NOTES
[facility] Acute Rehab  Stroke screening per stroke order set via Epic. Please note that this is a screening and this patient will not actively be followed by the Admission RN's/ If you feel that she is or will be appropriate for Acute Rehab, please call the Admission Office at [phone] and a full evaluation will be initiated.     Thank You    Leatha Mendieta RN  Acute Rehab Admission Nurse

## 2023-04-29 NOTE — PROGRESS NOTES
Name: Holli Patel ADMIT: 2023   : 1965  PCP: Vandana Gastelum MD    MRN: [id number] LOS: 1 days   AGE/SEX: 57 y.o. female  ROOM: [facility]     Subjective   Subjective     No events overnight. No new complaints.       Objective   Objective   Vital Signs  Temp:  [97.7 °F (36.5 °C)-98.3 °F (36.8 °C)] 98.3 °F (36.8 °C)  Heart Rate:  [] 86  Resp:  [18-20] 20  BP: (122-138)/() 138/90  SpO2:  [94 %-99 %] 98 %  on   ;   Device (Oxygen Therapy): room air  There is no height or weight on file to calculate BMI.  Physical Exam  Constitutional:       General: She is not in acute distress.     Appearance: She is not toxic-appearing.   Cardiovascular:      Rate and Rhythm: Normal rate and regular rhythm.      Heart sounds: Normal heart sounds.   Pulmonary:      Effort: Pulmonary effort is normal.      Breath sounds: Normal breath sounds.   Abdominal:      General: Bowel sounds are normal.      Palpations: Abdomen is soft.   Skin:     Findings: Erythema and rash present.      Comments: Left elbow swollen, erythematous, and warm   Neurological:      Mental Status: She is alert.   Psychiatric:         Mood and Affect: Mood normal.         Behavior: Behavior normal.         Results Review     I reviewed the patient's new clinical results.  Results from last 7 days   Lab Units 23  0531 23  1036   WBC 10*3/mm3 21.93* 17.39*   HEMOGLOBIN g/dL 7.8* 8.3*   PLATELETS 10*3/mm3 127* 146     Results from last 7 days   Lab Units 23  0531 23  1036   SODIUM mmol/L 138 142   POTASSIUM mmol/L 4.4 4.3   CHLORIDE mmol/L 106 104   CO2 mmol/L 23.0 24.2   BUN mg/dL 31* 26*   CREATININE mg/dL 1.01* 1.12*   GLUCOSE mg/dL 82 126*   Estimated Creatinine Clearance: 61.1 mL/min (A) (by C-G formula based on SCr of 1.01 mg/dL (H)).  Results from last 7 days   Lab Units 23  0531 23  1036   ALBUMIN g/dL 3.6 3.6   BILIRUBIN mg/dL <0.2 <0.2*   ALK PHOS U/L 113 100   AST (SGOT) U/L 9 12   ALT (SGPT)  U/L 12 16     Results from last 7 days   Lab Units 04/29/23  0531 04/25/23  1036   CALCIUM mg/dL 7.7* 8.9   ALBUMIN g/dL 3.6 3.6       COVID19   Date Value Ref Range Status   08/03/2022 Not Detected Not Detected - Ref. Range Final   11/26/2021 Not Detected Not Detected - Ref. Range Final   05/18/2021 Not Detected Not Detected - Ref. Range Final     SARS-CoV-2 PCR   Date Value Ref Range Status   04/28/2021 Not Detected Not Detected Final     Comment:     Nucleic acid specific to SARS-CoV-2 (COVID-19) virus was not detected in  this sample by the TaqPath (TM) COVID-19 Combo Kit.          SARS-CoV-2 (COVID-19) nucleic acid testing performed using [employer] Aptima (R) SARS-CoV-2 Assay or [employer] TaqPath (TM)  COVID-19 Combo Kit as indicated above under Emergency Use Authorization (EUA) from the FDA. Aptima (R) and TaqPath (TM) test performance  characteristics verified by [facility] in accordance with the FDAs Guidance Document (Policy for Diagnostic Tests for Coronavirus Disease-2019  during the Public Health Emergency) issued on March 16, 2020. The laboratory is regulated under CLIA as qualified to perform high-complexity testing  Unless otherwise noted, all testing was performed at [facility], CLIA No. [id number], [facility] Licensee No. [id number]     Hemoglobin A1C   Date/Time Value Ref Range Status   04/29/2023 0531 5.00 4.80 - 5.60 % Final     Glucose   Date/Time Value Ref Range Status   04/29/2023 0435 73 70 - 130 mg/dL Final     Comment:     Meter: [id number] : [id number] Henry EDMONDSON   04/28/2023 1651 72 70 - 130 mg/dL Final     Comment:     Meter: [id number] : [id number] Hatchett Sherrish NA       MRI Brain With & Without Contrast  Narrative: STAT MRI OF THE BRAIN WITH AND WITHOUT CONTRAST ON 04/28/2023     CLINICAL HISTORY: Patient has non-small cell lung cancer metastatic to  bone. Patient has recent MRI of the brain on 01/23/2023 demonstrating  multiple tiny new enhancing brain  metastases. Patient has right arm  weakness.     TECHNIQUE: Axial T1, FLAIR, fat-suppressed T2, axial diffusion and  gradient echo T2 and postcontrast axial fat-suppressed T1 and sagittal  and coronal T1-weighted and thin cut 1.5 mm thick axial postcontrast  spoiled gradient echo T1-weighted images were obtained of the entire  head.     COMPARISON: This is correlated to 5 prior MRIs of the brain from [facility] including 05/14/2021 and 10/26/2021 and 08/04/2022  and 11/21/2022 and 01/23/2023 and most recent MRI of the brain a month  ago on 03/28/2023.     FINDINGS: There is minimal T2 high signal in the periventricular white  matter consistent with minimal small vessel disease. There are small old  lacunar infarcts in the mid right and left corona radiata region and a 4  mm old lacunar infarct in the right caudate nucleus. There are 3  separate 3 to 5 mm nodular areas of encephalomalacia within the lateral  left occipital periventricular white matter compatible with chronic  infarcts in the distribution lateral occipital branch of the left MCA  territory and they are unchanged since MRI of the brain 01/23/2023 but  new when compared to MRI of the brain on 11/21/2022. There are several  diffusion hyperintense foci that demonstrate low signal on the ADC maps  that are consistent with several small acute infarcts including an 8 x 5  mm acute infarct in the mid left corona radiata region posterior to  which is an additional 10 x 6 mm acute infarct in the mid left corona  radiata region that extends superiorly into the posterior superior left  frontal subcortical white matter and there is a cluster of 13 separate 2  to 4 mm acute infarcts involving the superior left frontal parietal  cortex involving the pre and postcentral gyri and there are 2 tiny  diffusion hyperintense foci consistent with tiny acute infarct in the  left parietal occipital white matter and these are compatible with  multiple tiny  acute infarcts in the left MCA territory. There is a very  tiny subtle 2 mm diffusion hyperintensity in the posterior tip of the  posterior right occipital cortex which could potentially represent a  tiny acute infarct in the right PCA territory. The ventricles are normal  in size.  I see no mass effect and no midline shift and no extra-axial  fluid collections are identified. Prior MRI of the brain on 01/23/2023  demonstrated 9 separate tiny 2 to 6 mm enhancing lesions in the brain  compatible with multiple tiny new brain metastasis when compared to the  MRI of the brain 08/04/2022. On the most recent MRI of the brain on  03/28/2023 the tiny enhancing brain metastases demonstrated a response  to treatment with a decrease in the size of the majority of the lesions  and a decrease in the intensity in the enhancing metastatic lesions to  the brain. Since 03/28/2023, the residual 3 x 2 x 3 mm enhancing  metastatic lesion in the lateral right cerebellum is artifact streaking  through it on axial sequences slightly limiting evaluation but appears  to have possibly increased as it now measures 5 x 3 x 3 mm in anterior  posterior mediolateral and craniocaudal dimension. The residual  enhancing metastatic lesion in the posterior aspect of the mid right  cerebellum on 03/28/2023 measured 3 x 2 x 3 mm and currently measures 4  x 3 x 3 mm and is stable to increased by 1 mm in size. The enhancing  metastases in the midline of the inferior vermis of the cerebellum has  increased slightly in size previously measuring 2 to 3 mm in size and  now measuring 4.5 x 4 mm in size on axial spoiled gradient echo image 38  sequence 18. There is a probable very subtle 3 x 2 mm enhancing  metastases to the posterior lateral left cerebellum seen on spoiled  gradient echo image 49 sequence 18 but not seen on the other sequences  and potentially could be artifact. There is an additional 3 mm nodular  focus of suggested enhancement in the mid  lateral left cerebellum on  axial spoiled gradient echo images 44 and 45 sequence 18 that is not  confirmed on the other sequences but is suspicious for a tiny enhancing  metastases that may have been present previously measuring 2 mm in size.  The enhancing metastases to the posterior medial tip of the right  occipital lobe measures 7 x 6 x 8 mm previously measured 5 x 5 x 5 mm  and may have increased slightly in size. Superficial cortical metastases  to the medial right occipital cortex no longer demonstrate convincing  enhancement. There is a residual 8 x 5 mm focus of FLAIR hyperintensity  compatible with an area of gliosis at the site. The previously  identified barely perceptible 1 mm enhancing lesion in the posterior  superior left temporal cortex in the inferior lateral mid lateral left  occipital cortex are no longer clearly discernible. The calvarium and  skull base demonstrate normal marrow signal intensity. There is mild  left frontal and bilateral ethmoid and maxillary sinus mucosal  thickening, mastoid and middle ear cavities are clear. Good flow voids  are demonstrated in the cerebral vessels and in the dural venous  sinuses.     Impression: 1. There are multiple small acute infarcts in the left middle cerebral  artery territory including an 8 x 5 mm infarct in the mid left corona  radiata region and a 10 x 6 mm acute infarct extending from the mid left  corona radiata region into the posterior superior left frontal  subcortical white matter and there is a cluster of 13 separate 2 to 4 mm  tiny acute infarcts involving the superior left frontal parietal cortex  extending into the left pre and postcentral gyri and there are 2  separate tiny 2 to 4 mm tiny acute infarcts in the lateral left parietal  occipital white matter. There is a questionable very tiny 2 mm acute  posterior right occipital cortical infarct in the right PCA territory.  Furthermore between the MRI of the brain on 11/21/2022 and  01/23/2023  the patient developed 3 separate 3 to 5 mm chronic infarcts in the  lateral left occipital periventricular white matter and these 3 chronic  lateral left occipital infarcts are in the left MCA territory. Further  evaluation for the source of these tiny embolic infarcts is recommended.  2. There is minimal small vessel disease in the periventricular white  matter, tiny old lacunar infarct in the right caudate nucleus, tiny old  lacunar infarcts in the mid right left corona radiata region.  3. Between 11/21/2022 and 01/23/2023 the patient developed nine 2 to 6  mm enhancing metastatic lesions to the superficial cortex of the brain  that demonstrated a response to treatment with decrease in size on  subsequent MRI of the brain on 03/28/2023. However on the current exam,  the remaining tiny enhancing metastatic lesions have equivocally  increased by a millimeter in size when compared to most recent MRI of  the brain on 03/28/2023. In particular, that on March 28 measured 3 x 2  x 3 mm and there is a 4 x 3 x 3 mm enhancing met to the posterior aspect  of mid right cerebellum that previously measured 2 x 2 x 2 mm. The  enhancing metastases in the midline of the inferior vermis of the  cerebellum measures 4.5 x 4 x 4 mm and previously measured 2 x 2 x 3 mm.  There is a questionable 3 mm subtle met to the lateral left cerebellum  that in retrospect was likely present previously measuring 2 mm in size.  The enhancing superficial cortical metastases in the medial right  occipital lobe has resolved with a residual FLAIR hyperintense 8 x 5 mm  area of gliosis at the site of this treated metastases. The other tiny 1  mm enhancing metastases in the posterior superior left temporal cortex  and the 2 lesions in the lateral left occipital cortex have resolved. No  definite additional enhancing metastases are seen in the brain.  Continued close follow-up in the metastases is recommended. The results  of the study were  communicated to Dr. Snell by telephone on 04/28/2023  at 11:30 AM.             Scheduled Medications  aspirin, 325 mg, Oral, Daily   Or  aspirin, 300 mg, Rectal, Daily  atorvastatin, 80 mg, Oral, Nightly  cephalexin, 500 mg, Oral, Q8H  dexamethasone, 2 mg, Oral, Daily  folic acid, 1,000 mcg, Oral, Daily  letrozole, 2.5 mg, Oral, Daily  oxybutynin, 5 mg, Oral, TID  pantoprazole, 40 mg, Oral, Q AM  potassium chloride, 20 mEq, Oral, Daily  rivaroxaban, 20 mg, Oral, Daily  sodium chloride, 10 mL, Intravenous, Q12H  sodium chloride, 10 mL, Intravenous, Q12H    Infusions  sodium chloride, 75 mL/hr, Last Rate: 75 mL/hr (04/28/23 2109)    Diet  Diet: Regular/House Diet; Texture: Regular Texture (IDDSI 7); Fluid Consistency: Thin (IDDSI 0)       Assessment/Plan     Active Hospital Problems    Diagnosis  POA   • **Abnormal MRI of head [R93.0]  Unknown   • MSSA infection, non-invasive [A49.01]  Unknown   • Olecranon bursa abscess, left [M71.022]  Unknown   • Abnormal finding on MRI of brain [R90.89]  Yes   • Metastasis to brain [C79.31]  Yes   • Polyneuropathy [G62.9]  Yes   • Ataxia [R27.0]  Yes   • Bony metastasis [C79.51]  Yes   • Stage 3a chronic kidney disease [N18.31]  Yes   • History of bilateral breast cancer [Z85.3]  Not Applicable   • 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       57 y.o. female admitted with Abnormal MRI of head.    · Acute CVA-suspected to be embolic. started on asa and high intensity statin. Already on xarelto for DVT. Echocardiogram, CTA head and neck pending. Await recommendations from neurology.  · Left elbow olecranon bursitis with culture positive for MSSA-on keflex. 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 She is on dexamethasone chronically   · Acute left soleal DVT 3/21/23-on xarelto  · GERD-ppi  · History of breast cancer-on letrozole   · Chemotherapy induced neuropathy  · Cancer related pain-roxicodone  · Anxiety-alprazolam  · SCDs for DVT prophylaxis.  · Full code.  · Discussed with patient.  · Anticipate discharge TBD timing yet to be determined.      Piotr Siu MD  [facility]  04/29/23  12:10 EDT    I wore protective equipment throughout this patient encounter including a face mask, gloves and protective eyewear.  Hand hygiene was performed before donning protective equipment and after removal when leaving the room.

## 2023-04-29 NOTE — PLAN OF CARE
Goal Outcome Evaluation:      Orders received via stroke protocol, patient passed dysphagia screen.  RN, Marga, reports no difficulties with cognition/communication.  ST to sign off, please reconsult, if needed.

## 2023-04-29 NOTE — CONSULTS
Stroke Consult Note    Patient Name: Holli Patel   MRN: [id number]  Age: 57 y.o.  Sex: female  : 1965    Primary Care Physician: Vandana Gastelum MD  Referring Physician:  Chris Miles MD    Handedness: Right  Race: White    Chief Complaint/Reason for Consultation: Right arm weakness    Subjective .  HPI: 57-year-old right-handed white female with known diagnosis of metastatic lung cancer, breast cancer, with metastasis to bone and brain, DVT on anticoagulation with Xarelto, hyperlipidemia, who had an episode of right arm weakness and trouble with speech on Monday which resolved within 30 minutes, and again had a right upper extremity on Wednesday morning, for which she contacted the cancer team who performed outpatient MRI brain yesterday which showed a left MCA stroke for which she was admitted.  She did not have trouble speaking during the second episode, but her right upper extremity continues to be weak.  She denies any right lower extremity symptoms.  Patient has been compliant with her Xarelto (she used to be on Eliquis and was recently switched to Xarelto 2 weeks ago, and by mistake patient's  was giving her Xarelto twice daily until recently-so patient was overtreated with anticoagulation).    Last Known Normal Date/Time: 2023 11 PM EST     Review of Systems   Constitutional: Positive for fatigue.   Neurological: Positive for weakness.   All other systems reviewed and are negative.     Past Medical History:   Diagnosis Date   • Anxiety    • Clot     POSSIBLE BLOOD CLOT IN VENOUS ACCESS DEVICE-PT STATES WAS STARTED ON ELIQUIS    • Dyspnea on exertion    • Elevated cholesterol    • Frequent urination     DAY AND NIGHT   • SHAYY (generalized anxiety disorder)     RELATED HIGH BLOOD PRESSURE   • GERD (gastroesophageal reflux disease)    • High blood pressure     ANXIETY RELATED   • Hyperlipidemia    • Hypothyroidism    • Lung cancer    • Lung nodule     LEFT   • Malignant neoplasm of  upper-outer quadrant of right breast in female, estrogen receptor positive 4/13/2021    PT STATES HAS BILAT BREAST CANCER   • Migraine    • PONV (postoperative nausea and vomiting)      Past Surgical History:   Procedure Laterality Date   • BREAST BIOPSY Bilateral 04/07/2021    Right Breast 10 o'clock & Left breast 10 o'clock 6 cm from nipple, [facility]   • CLOSED REDUCTION HIP DISLOCATION Left 4/30/2021    Procedure: BRONCHOSCOPY, LEFT VIDEO ASSITED THORACOSCOPY, ROBOTIC ASSSITED MEDIASTINAL LYMPHADENECTOMY WITH INTERCOSTAL NERVE BLOCKS;  Surgeon: Raphael Kerr III, MD;  Location: [facility] MAIN OR;  Service: DaVinci;  Laterality: Left;   • COLONOSCOPY     • EPIDURAL Bilateral 12/28/2022    Procedure: LUMBAR/SACRAL TRANSFORAMINAL EPIDURAL Bilateral L5-S1;  Surgeon: Breann Santiago MD;  [place] [facility] [place];  Service: Pain Management;  Laterality: Bilateral;   • EPIDURAL Right 2/13/2023    Procedure: LUMBAR/SACRAL TRANSFORAMINAL EPIDURAL Right L5-S1 23085 (Hold eliqus 3 days);  Surgeon: Breann Santiago MD;  Location: [facility] MAIN OR;  Service: Pain Management;  Laterality: Right;   • TUBAL ABDOMINAL LIGATION     • VENOUS ACCESS DEVICE (PORT) INSERTION Right 5/20/2021    Procedure: INSERTION VENOUS ACCESS DEVICE;  Surgeon: Raphael Kerr III, MD;  Location: [facility] MAIN OR;  Service: Thoracic;  Laterality: Right;   • VENOUS ACCESS DEVICE (PORT) INSERTION Right 11/29/2021    Procedure: REVISION AND REPLACEMENT RIGHT SUBCLAVIAN VENOUS ACCESS PORT;  Surgeon: Raphael Kerr III, MD;  Location: [facility] MAIN OR;  Service: Thoracic;  Laterality: Right;   • WRIST SURGERY Right      Family History   Problem Relation Age of Onset   • Cancer Father         LYMPHATIC   • Prostate cancer Father    • Lymphoma Father    • Alcohol abuse Brother    • Colon cancer Maternal Grandmother    • Malig Hyperthermia Neg Hx      Social History     Socioeconomic History   • Marital status:      Spouse name: Jelani   Tobacco Use   •  Smoking status: Former     Packs/day: 1.00     Years: 30.00     Pack years: 30.00     Types: Electronic Cigarette, Cigarettes     Start date: 1981     Quit date:      Years since quittin.3   • Smokeless tobacco: Never   • Tobacco comments:     ABT 15 CIGARETTES DAILY   Vaping Use   • Vaping Use: Some days   • Substances: Nicotine, Flavoring   • Devices: Disposable   Substance and Sexual Activity   • Alcohol use: Never   • Drug use: Never   • Sexual activity: Yes     Partners: Male     No Known Allergies  Prior to Admission medications    Medication Sig Start Date End Date Taking? Authorizing Provider   ALPRAZolam (XANAX) 0.5 MG tablet 1 BY MOUTH EVERY 6 HOURS, CHANGE IN QUANTITY, METASTATIC CANCER 23  Yes Vandana Gastelum MD   dexamethasone (DECADRON) 2 MG tablet Take 1 tablet by mouth Daily. Take at noon 23  Yes Salma Snell MD   dexamethasone (DECADRON) 4 MG tablet Take 2 tablets oral twice a day for 3 consecutive days beginning the day before chemotherapy and continue for 6 doses. 23  Yes Salma Snell MD   folic acid (FOLVITE) 1 MG tablet TAKE 1 TABLET BY MOUTH EVERY DAY 23  Yes Salma Snell MD   letrozole (FEMARA) 2.5 MG tablet Take 1 tablet by mouth Daily. 23  Yes Salma Snell MD   naloxone (Narcan) 4 MG/0.1ML nasal spray 1 spray into the nostril(s) as directed by provider As Needed (high dose mme or any narcotic with benzo).  Patient taking differently: 1 spray into the nostril(s) as directed by provider As Needed (high dose mme or any narcotic with benzo). Has not use 23  Yes Vandana Gastelum MD   omeprazole (PrilOSEC) 20 MG capsule Take 1 capsule by mouth 2 (Two) Times a Day. 23  Yes Vandana Gastelum MD   ondansetron (Zofran) 8 MG tablet Take 1 tablet by mouth Every 8 (Eight) Hours As Needed for Nausea or Vomiting. 23  Yes Salma Snell MD   oxyCODONE (ROXICODONE) 15 MG immediate release tablet Take 1 tablet by mouth Every 4 (Four)  Hours As Needed for Moderate Pain. 4/17/23  Yes Salma Snell MD   potassium chloride (MICRO-K) 10 MEQ CR capsule TAKE 2 CAPSULES BY MOUTH EVERY DAY 3/9/23  Yes Salma Snell MD   rivaroxaban (XARELTO) 20 MG tablet Take 1 tablet by mouth Daily. 4/17/23  Yes Salma Snell MD   sulfamethoxazole-trimethoprim (Bactrim DS) 800-160 MG per tablet Take 2 tablets by mouth 2 (Two) Times a Day. 4/27/23  Yes Hadley Vora APRN   oxybutynin (DITROPAN) 5 MG tablet Take 1 tablet by mouth 3 (Three) Times a Day. 4/28/23   Vandana Gastelum MD   rizatriptan (MAXALT) 10 MG tablet Take 1 tablet by mouth 1 (One) Time for 1 dose. AT ONSET OF HEADACHE MAY REPEAT ONE TABLET IN 2 HOURS IF NEEDED 4/24/23 4/24/23  Vandana Gastelum MD             Objective     Temp:  [97.7 °F (36.5 °C)-98.3 °F (36.8 °C)] 98.3 °F (36.8 °C)  Heart Rate:  [] 86  Resp:  [18-20] 20  BP: (122-138)/() 138/90  Neurological Exam  Mental Status  Awake, alert and oriented to person, place and time.Alert. Speech is normal. Language is fluent with no aphasia.    Cranial Nerves  CN II: Visual fields full to confrontation.  CN III, IV, VI: Extraocular movements intact bilaterally. Normal lids and orbits bilaterally. Pupils equal round and reactive to light bilaterally.  CN V: Facial sensation is normal.  CN VII: Full and symmetric facial movement.  CN IX, X: Palate elevates symmetrically  CN XI: Shoulder shrug strength is normal.  CN XII: Tongue midline without atrophy or fasciculations.    Motor  Normal muscle bulk throughout. No fasciculations present. Normal muscle tone.  Right upper extremity is 4+/5, right wrist is 4/5, decreased fine finger movement in the right hand  Left upper extremity and bilateral lower extremities 5/5.    Sensory  Light touch is normal in upper and lower extremities.     Coordination    No dysmetria.    Gait    Not assessed.      Physical Exam  Vitals and nursing note reviewed.   Constitutional:       Appearance:  Normal appearance.   HENT:      Head: Normocephalic and atraumatic.      Mouth/Throat:      Mouth: Mucous membranes are moist.      Pharynx: Oropharynx is clear.   Eyes:      General: Lids are normal.      Extraocular Movements: Extraocular movements intact.      Pupils: Pupils are equal, round, and reactive to light.   Cardiovascular:      Rate and Rhythm: Normal rate and regular rhythm.   Pulmonary:      Effort: Pulmonary effort is normal. No respiratory distress.   Musculoskeletal:      Cervical back: Normal range of motion and neck supple.   Neurological:      Mental Status: She is alert.   Psychiatric:         Mood and Affect: Mood normal.         Speech: Speech normal.         Behavior: Behavior normal.         Acute Stroke Data    IV Thrombolytic (TPA/Tenecteplase) Inclusion / Exclusion Criteria    Time: 13:03 EDT  Person Administering Scale: Barron Dowd MD    Inclusion Criteria  [x]   18 years of age or greater   []   Onset of symptoms < 4.5 hours before beginning treatment (stroke onset = time patient was last seen well or without symptoms).   [x]   Diagnosis of acute ischemic stroke causing measurable disabling deficit (Complete Hemianopia, Any Aphasia, Visual or Sensory Extinction, Any weakness limiting sustained effort against gravity)   [x]   Any remaining deficit considered potentially disabling in view of patient and practitioner   Exclusion criteria (Do not proceed with Alteplase if any are checked under exclusion criteria)  [x]   Onset unknown or GREATER than 4.5 hours   []   ICH on CT/MRI   []   CT demonstrates hypodensity representing acute or subacute infarct   []   Significant head trauma or prior stroke in the previous 3 months   []   Symptoms suggestive of subarachnoid hemorrhage   []   History of un-ruptured intracranial aneurysm GREATER than 10 mm   []   Recent intracranial or intraspinal surgery within the last 3 months   []   Arterial puncture at a non-compressible site in the previous  7 days   []   Active internal bleeding   []   Acute bleeding tendency   []   Platelet count LESS than 100,000 for known hematological diseases such as leukemia, thrombocytopenia or chronic cirrhosis   []   Current use of anticoagulant with INR GREATER than 1.7 or PT GREATER than 15 seconds, aPTT GREATER than 40 seconds   []   Heparin received within 48 hours, resulting in abnormally elevated aPTT GREATER than upper limit of normal   []   Current use of direct thrombin inhibitors or direct factor Xa inhibitors in the past 48 hours   []   Elevated blood pressure refractory to treatment (systolic GREATER than 185 mm/Hg or diastolic  GREATER than 110 mm/Hg   []   Suspected infective endocarditis and aortic arch dissection   []   Current use of therapeutic treatment dose of low-molecular-weight heparin (LMWH) within the previous 24 hours   []   Structural GI malignancy or bleed   Relative exclusion for all patients  []   Only minor nondisabling symptoms   []   Pregnancy   []   Seizure at onset with postictal residual neurological impairments   []   Major surgery or previous trauma within past 14 days   []   History of previous spontaneous ICH, intracranial neoplasm, or AV malformation   []   Postpartum (within previous 14 days)   []   Recent GI or urinary tract hemorrhage (within previous 21 days)   []   Recent acute MI (within previous 3 months)   []   History of unruptured intracranial aneurysm LESS than 10 mm   []   History of ruptured intracranial aneurysm   []   Blood glucose LESS than 50 mg/dL (2.7 mmol/L)   []   Dural puncture within the last 7 days   []   Known GREATER than 10 cerebral microbleeds   Additional exclusions for patients with symptoms onset between 3 and 4.5 hours.  []   Age > 80.   []   On any anticoagulants regardless of INR  >>> Warfarin (Coumadin), Heparin, Enoxaparin (Lovenox), fondaparinux (Arixtra), bivalirudin (Angiomax), Argatroban, dabigatran (Pradaxa), rivaroxaban (Xarelto), or apixaban  (Eliquis)   []   Severe stroke (NIHSS > 25).   []   History of BOTH diabetes and previous ischemic stroke.   []   The risks and benefits have been discussed with the patient or family related to the administration of IV alteplase for stroke symptoms.   []   I have discussed and reviewed the patient's case and imaging with the attending prior to IV Thrombolytic (TPA/Tenecteplase).    Time Thrombolytic administered       Hospital Meds:  Scheduled- aspirin, 325 mg, Oral, Daily   Or  aspirin, 300 mg, Rectal, Daily  atorvastatin, 80 mg, Oral, Nightly  cephalexin, 500 mg, Oral, Q8H  dexamethasone, 2 mg, Oral, Daily  folic acid, 1,000 mcg, Oral, Daily  letrozole, 2.5 mg, Oral, Daily  oxybutynin, 5 mg, Oral, TID  pantoprazole, 40 mg, Oral, Q AM  potassium chloride, 20 mEq, Oral, Daily  rivaroxaban, 20 mg, Oral, Daily  sodium chloride, 10 mL, Intravenous, Q12H  sodium chloride, 10 mL, Intravenous, Q12H      Infusions- sodium chloride, 75 mL/hr, Last Rate: 75 mL/hr (23)       PRNs- •  ALPRAZolam  •  ondansetron  •  oxyCODONE  •  sodium chloride  •  sodium chloride  •  sodium chloride  •  sodium chloride  •  sodium chloride    Functional Status Prior to Current Stroke/Alexander Score: 0    NIH Stroke Scale  Time: 13:03 EDT  Person Administering Scale: Barron Dowd MD    1a  Level of consciousness: 0=alert; keenly responsive   1b. LOC questions:  0=Performs both tasks correctly   1c. LOC commands: 0=Performs both tasks correctly   2.  Best Gaze: 0=normal   3.  Visual: 0=No visual loss   4. Facial Palsy: 0=Normal symmetric movement   5a.  Motor left arm: 0=No drift, limb holds 90 (or 45) degrees for full 10 seconds   5b.  Motor right arm: 0=No drift, limb holds 90 (or 45) degrees for full 10 seconds   6a. motor left le=No drift, limb holds 90 (or 45) degrees for full 10 seconds   6b  Motor right le=No drift, limb holds 90 (or 45) degrees for full 10 seconds   7. Limb Ataxia: 0=Absent   8.  Sensory: 0=Normal;  no sensory loss   9. Best Language:  0=No aphasia, normal   10. Dysarthria: 0=Normal   11. Extinction and Inattention: 0=No abnormality    Total:   0       Results Reviewed:  I have personally reviewed current lab, radiology, and data   MRI brain with and without contrast does show left MCA stroke, in addition to enhancing metastatic lesion noted  A1c is 5, LDL is 79, other labs were reviewed            Assessment/Plan:      1. Left middle cerebral artery stroke.  Unknown etiology.  Recommend checking CT angiogram of head and neck, which if negative, will talk to the oncology team and consider switching from Xarelto to Lovenox therapeutic dose.  If CT angiogram does show some significant left ICA stenosis, will consider further treatment.  Continue her on aspirin 81 mg and Lipitor 80 mg daily along with Xarelto for now.  2. Lung cancer with metastasis to brain and bone.  Further management as per the primary team.  3. Mixed hyperlipidemia.  Relatively well controlled.  Continue her on full dose of statins.  4. Increase activity as tolerated.  5. Okay to give her home dose of Xanax.    Case was discussed with patient, her , nursing and will let primary team know.  Thank you for the consult.          Barron Dowd MD  April 29, 2023  13:03 EDT

## 2023-04-30 ENCOUNTER — APPOINTMENT (OUTPATIENT)
Dept: CARDIOLOGY | Facility: HOSPITAL | Age: 58
End: 2023-04-30
Payer: COMMERCIAL

## 2023-04-30 LAB
ABO GROUP BLD: NORMAL
ANION GAP SERPL CALCULATED.3IONS-SCNC: 10 MMOL/L (ref 5–15)
AORTIC ARCH: 2.5 CM
AORTIC DIMENSIONLESS INDEX: 0.9 (DI)
ASCENDING AORTA: 2.7 CM
BH CV ECHO MEAS - ACS: 1.92 CM
BH CV ECHO MEAS - AO MAX PG: 7.8 MMHG
BH CV ECHO MEAS - AO MEAN PG: 4.7 MMHG
BH CV ECHO MEAS - AO V2 MAX: 139.8 CM/SEC
BH CV ECHO MEAS - AO V2 VTI: 24.9 CM
BH CV ECHO MEAS - AVA(I,D): 2.14 CM2
BH CV ECHO MEAS - EDV(CUBED): 101.8 ML
BH CV ECHO MEAS - EDV(MOD-SP2): 38 ML
BH CV ECHO MEAS - EDV(MOD-SP4): 43 ML
BH CV ECHO MEAS - EF(MOD-BP): 64.8 %
BH CV ECHO MEAS - EF(MOD-SP2): 68.4 %
BH CV ECHO MEAS - EF(MOD-SP4): 62.8 %
BH CV ECHO MEAS - ESV(CUBED): 37.8 ML
BH CV ECHO MEAS - ESV(MOD-SP2): 12 ML
BH CV ECHO MEAS - ESV(MOD-SP4): 16 ML
BH CV ECHO MEAS - FS: 28.1 %
BH CV ECHO MEAS - IVS/LVPW: 1 CM
BH CV ECHO MEAS - IVSD: 0.94 CM
BH CV ECHO MEAS - LAT PEAK E' VEL: 6.3 CM/SEC
BH CV ECHO MEAS - LV DIASTOLIC VOL/BSA (35-75): 24.1 CM2
BH CV ECHO MEAS - LV MASS(C)D: 149.8 GRAMS
BH CV ECHO MEAS - LV MAX PG: 7 MMHG
BH CV ECHO MEAS - LV MEAN PG: 3.4 MMHG
BH CV ECHO MEAS - LV SYSTOLIC VOL/BSA (12-30): 9 CM2
BH CV ECHO MEAS - LV V1 MAX: 132.4 CM/SEC
BH CV ECHO MEAS - LV V1 VTI: 21.9 CM
BH CV ECHO MEAS - LVIDD: 4.7 CM
BH CV ECHO MEAS - LVIDS: 3.4 CM
BH CV ECHO MEAS - LVOT AREA: 2.43 CM2
BH CV ECHO MEAS - LVOT DIAM: 1.76 CM
BH CV ECHO MEAS - LVPWD: 0.94 CM
BH CV ECHO MEAS - MED PEAK E' VEL: 6.3 CM/SEC
BH CV ECHO MEAS - MV A DUR: 0.11 SEC
BH CV ECHO MEAS - MV A MAX VEL: 111 CM/SEC
BH CV ECHO MEAS - MV DEC SLOPE: 257 CM/SEC2
BH CV ECHO MEAS - MV DEC TIME: 200 MSEC
BH CV ECHO MEAS - MV E MAX VEL: 62.1 CM/SEC
BH CV ECHO MEAS - MV E/A: 0.56
BH CV ECHO MEAS - MV MAX PG: 6.8 MMHG
BH CV ECHO MEAS - MV MEAN PG: 2.8 MMHG
BH CV ECHO MEAS - MV P1/2T: 95.9 MSEC
BH CV ECHO MEAS - MV V2 VTI: 24.2 CM
BH CV ECHO MEAS - MVA(P1/2T): 2.29 CM2
BH CV ECHO MEAS - MVA(VTI): 2.2 CM2
BH CV ECHO MEAS - PA ACC TIME: 0.13 SEC
BH CV ECHO MEAS - PA PR(ACCEL): 18.4 MMHG
BH CV ECHO MEAS - PA V2 MAX: 90.9 CM/SEC
BH CV ECHO MEAS - PULM A REVS DUR: 0.1 SEC
BH CV ECHO MEAS - PULM A REVS VEL: 32.1 CM/SEC
BH CV ECHO MEAS - PULM DIAS VEL: 39 CM/SEC
BH CV ECHO MEAS - PULM S/D: 2.04
BH CV ECHO MEAS - PULM SYS VEL: 79.7 CM/SEC
BH CV ECHO MEAS - QP/QS: 0.63
BH CV ECHO MEAS - RV MAX PG: 2.2 MMHG
BH CV ECHO MEAS - RV V1 MAX: 74.1 CM/SEC
BH CV ECHO MEAS - RV V1 VTI: 13.1 CM
BH CV ECHO MEAS - RVOT DIAM: 1.81 CM
BH CV ECHO MEAS - SI(MOD-SP2): 14.6 ML/M2
BH CV ECHO MEAS - SI(MOD-SP4): 15.1 ML/M2
BH CV ECHO MEAS - SV(LVOT): 53.1 ML
BH CV ECHO MEAS - SV(MOD-SP2): 26 ML
BH CV ECHO MEAS - SV(MOD-SP4): 27 ML
BH CV ECHO MEAS - SV(RVOT): 33.5 ML
BH CV ECHO MEAS - TAPSE (>1.6): 2.1 CM
BH CV ECHO MEASUREMENTS AVERAGE E/E' RATIO: 9.86
BH CV ECHO SHUNT ASSESSMENT PERFORMED (HIDDEN SCRIPTING): 1
BH CV XLRA - RV BASE: 2.7 CM
BH CV XLRA - RV LENGTH: 5.1 CM
BH CV XLRA - RV MID: 2.34 CM
BH CV XLRA - TDI S': 11.5 CM/SEC
BLD GP AB SCN SERPL QL: NEGATIVE
BUN SERPL-MCNC: 23 MG/DL (ref 6–20)
BUN/CREAT SERPL: 23.2 (ref 7–25)
CALCIUM SPEC-SCNC: 7.9 MG/DL (ref 8.6–10.5)
CHLORIDE SERPL-SCNC: 107 MMOL/L (ref 98–107)
CO2 SERPL-SCNC: 20 MMOL/L (ref 22–29)
CREAT SERPL-MCNC: 0.99 MG/DL (ref 0.57–1)
DEPRECATED RDW RBC AUTO: 50.5 FL (ref 37–54)
EGFRCR SERPLBLD CKD-EPI 2021: 65.8 ML/MIN/1.73
ERYTHROCYTE [DISTWIDTH] IN BLOOD BY AUTOMATED COUNT: 15.5 % (ref 12.3–15.4)
FERRITIN SERPL-MCNC: 284 NG/ML (ref 13–150)
GLUCOSE SERPL-MCNC: 98 MG/DL (ref 65–99)
HCT VFR BLD AUTO: 21.3 % (ref 34–46.6)
HGB BLD-MCNC: 6.9 G/DL (ref 12–15.9)
IRON 24H UR-MRATE: 43 MCG/DL (ref 37–145)
IRON SATN MFR SERPL: 15 % (ref 20–50)
LEFT ATRIUM VOLUME INDEX: 19.2 ML/M2
MAXIMAL PREDICTED HEART RATE: 161 BPM
MCH RBC QN AUTO: 29.6 PG (ref 26.6–33)
MCHC RBC AUTO-ENTMCNC: 32.4 G/DL (ref 31.5–35.7)
MCV RBC AUTO: 91.4 FL (ref 79–97)
PLATELET # BLD AUTO: 128 10*3/MM3 (ref 140–450)
PMV BLD AUTO: 10.1 FL (ref 6–12)
POTASSIUM SERPL-SCNC: 4.5 MMOL/L (ref 3.5–5.2)
RBC # BLD AUTO: 2.33 10*6/MM3 (ref 3.77–5.28)
RH BLD: POSITIVE
SINUS: 2.7 CM
SODIUM SERPL-SCNC: 137 MMOL/L (ref 136–145)
STJ: 2.21 CM
STRESS TARGET HR: 137 BPM
T&S EXPIRATION DATE: NORMAL
TIBC SERPL-MCNC: 295 MCG/DL (ref 298–536)
TRANSFERRIN SERPL-MCNC: 198 MG/DL (ref 200–360)
WBC NRBC COR # BLD: 14.28 10*3/MM3 (ref 3.4–10.8)

## 2023-04-30 PROCEDURE — 99233 SBSQ HOSP IP/OBS HIGH 50: CPT | Performed by: PSYCHIATRY & NEUROLOGY

## 2023-04-30 PROCEDURE — 86900 BLOOD TYPING SEROLOGIC ABO: CPT

## 2023-04-30 PROCEDURE — P9016 RBC LEUKOCYTES REDUCED: HCPCS

## 2023-04-30 PROCEDURE — 86901 BLOOD TYPING SEROLOGIC RH(D): CPT | Performed by: STUDENT IN AN ORGANIZED HEALTH CARE EDUCATION/TRAINING PROGRAM

## 2023-04-30 PROCEDURE — G0378 HOSPITAL OBSERVATION PER HR: HCPCS

## 2023-04-30 PROCEDURE — 25010000002 CEFAZOLIN IN DEXTROSE 2-4 GM/100ML-% SOLUTION: Performed by: STUDENT IN AN ORGANIZED HEALTH CARE EDUCATION/TRAINING PROGRAM

## 2023-04-30 PROCEDURE — 80048 BASIC METABOLIC PNL TOTAL CA: CPT | Performed by: STUDENT IN AN ORGANIZED HEALTH CARE EDUCATION/TRAINING PROGRAM

## 2023-04-30 PROCEDURE — 84466 ASSAY OF TRANSFERRIN: CPT | Performed by: INTERNAL MEDICINE

## 2023-04-30 PROCEDURE — 86900 BLOOD TYPING SEROLOGIC ABO: CPT | Performed by: STUDENT IN AN ORGANIZED HEALTH CARE EDUCATION/TRAINING PROGRAM

## 2023-04-30 PROCEDURE — 36430 TRANSFUSION BLD/BLD COMPNT: CPT

## 2023-04-30 PROCEDURE — 82728 ASSAY OF FERRITIN: CPT | Performed by: INTERNAL MEDICINE

## 2023-04-30 PROCEDURE — 99232 SBSQ HOSP IP/OBS MODERATE 35: CPT | Performed by: STUDENT IN AN ORGANIZED HEALTH CARE EDUCATION/TRAINING PROGRAM

## 2023-04-30 PROCEDURE — 83540 ASSAY OF IRON: CPT | Performed by: INTERNAL MEDICINE

## 2023-04-30 PROCEDURE — 93306 TTE W/DOPPLER COMPLETE: CPT | Performed by: INTERNAL MEDICINE

## 2023-04-30 PROCEDURE — 93306 TTE W/DOPPLER COMPLETE: CPT

## 2023-04-30 PROCEDURE — 86923 COMPATIBILITY TEST ELECTRIC: CPT

## 2023-04-30 PROCEDURE — 86850 RBC ANTIBODY SCREEN: CPT | Performed by: STUDENT IN AN ORGANIZED HEALTH CARE EDUCATION/TRAINING PROGRAM

## 2023-04-30 PROCEDURE — 99255 IP/OBS CONSLTJ NEW/EST HI 80: CPT | Performed by: INTERNAL MEDICINE

## 2023-04-30 PROCEDURE — 85027 COMPLETE CBC AUTOMATED: CPT | Performed by: STUDENT IN AN ORGANIZED HEALTH CARE EDUCATION/TRAINING PROGRAM

## 2023-04-30 RX ORDER — CEFAZOLIN SODIUM 2 G/100ML
2 INJECTION, SOLUTION INTRAVENOUS EVERY 8 HOURS
Status: DISCONTINUED | OUTPATIENT
Start: 2023-04-30 | End: 2023-05-01 | Stop reason: HOSPADM

## 2023-04-30 RX ORDER — ENOXAPARIN SODIUM 100 MG/ML
1 INJECTION SUBCUTANEOUS EVERY 12 HOURS
Status: DISCONTINUED | OUTPATIENT
Start: 2023-05-01 | End: 2023-05-01 | Stop reason: HOSPADM

## 2023-04-30 RX ORDER — BISACODYL 5 MG/1
10 TABLET, DELAYED RELEASE ORAL DAILY PRN
Status: DISCONTINUED | OUTPATIENT
Start: 2023-04-30 | End: 2023-05-01 | Stop reason: HOSPADM

## 2023-04-30 RX ADMIN — Medication 10 ML: at 08:35

## 2023-04-30 RX ADMIN — PANTOPRAZOLE SODIUM 40 MG: 40 TABLET, DELAYED RELEASE ORAL at 05:28

## 2023-04-30 RX ADMIN — CEPHALEXIN 500 MG: 500 CAPSULE ORAL at 05:28

## 2023-04-30 RX ADMIN — OXYCODONE HYDROCHLORIDE 15 MG: 15 TABLET ORAL at 15:48

## 2023-04-30 RX ADMIN — DEXAMETHASONE 2 MG: 2 TABLET ORAL at 11:53

## 2023-04-30 RX ADMIN — Medication 1000 MCG: at 08:27

## 2023-04-30 RX ADMIN — ASPIRIN 325 MG: 325 TABLET ORAL at 08:27

## 2023-04-30 RX ADMIN — LETROZOLE 2.5 MG: 2.5 TABLET ORAL at 08:27

## 2023-04-30 RX ADMIN — DOCUSATE SODIUM 50MG AND SENNOSIDES 8.6MG 2 TABLET: 8.6; 5 TABLET, FILM COATED ORAL at 08:27

## 2023-04-30 RX ADMIN — SODIUM CHLORIDE 75 ML/HR: 9 INJECTION, SOLUTION INTRAVENOUS at 01:11

## 2023-04-30 RX ADMIN — RIVAROXABAN 20 MG: 20 TABLET, FILM COATED ORAL at 08:27

## 2023-04-30 RX ADMIN — OXYCODONE HYDROCHLORIDE 15 MG: 15 TABLET ORAL at 08:26

## 2023-04-30 RX ADMIN — DOCUSATE SODIUM 50MG AND SENNOSIDES 8.6MG 2 TABLET: 8.6; 5 TABLET, FILM COATED ORAL at 20:46

## 2023-04-30 RX ADMIN — OXYCODONE HYDROCHLORIDE 15 MG: 15 TABLET ORAL at 20:49

## 2023-04-30 RX ADMIN — Medication 10 ML: at 20:46

## 2023-04-30 RX ADMIN — OXYBUTYNIN CHLORIDE 5 MG: 5 TABLET ORAL at 15:39

## 2023-04-30 RX ADMIN — OXYBUTYNIN CHLORIDE 5 MG: 5 TABLET ORAL at 20:46

## 2023-04-30 RX ADMIN — ALPRAZOLAM 0.5 MG: 0.5 TABLET ORAL at 20:46

## 2023-04-30 RX ADMIN — CEFAZOLIN SODIUM 2 G: 2 INJECTION, SOLUTION INTRAVENOUS at 09:50

## 2023-04-30 RX ADMIN — ALPRAZOLAM 0.5 MG: 0.5 TABLET ORAL at 05:28

## 2023-04-30 RX ADMIN — OXYCODONE HYDROCHLORIDE 15 MG: 15 TABLET ORAL at 01:11

## 2023-04-30 RX ADMIN — ATORVASTATIN CALCIUM 80 MG: 80 TABLET, FILM COATED ORAL at 20:46

## 2023-04-30 RX ADMIN — BISACODYL 10 MG: 5 TABLET ORAL at 17:20

## 2023-04-30 RX ADMIN — OXYBUTYNIN CHLORIDE 5 MG: 5 TABLET ORAL at 08:27

## 2023-04-30 RX ADMIN — CEFAZOLIN SODIUM 2 G: 2 INJECTION, SOLUTION INTRAVENOUS at 15:39

## 2023-04-30 NOTE — PROGRESS NOTES
Stroke Progress Note       Chief Complaint: Right arm weakness    Subjective    Subjective     Subjective:  No acute issues overnight.  Patient is feeling better than yesterday.    Review of Systems   Constitutional: Positive for fatigue.   Neurological: Positive for weakness.   All other systems reviewed and are negative.           Objective    Objective      Temp:  [97.5 °F (36.4 °C)-98 °F (36.7 °C)] 97.9 °F (36.6 °C)  Heart Rate:  [] 101  Resp:  [18-20] 18  BP: (104-126)/(69-96) 113/83    Neurological Exam  Mental Status  Awake, alert and oriented to person, place and time.Alert. Speech is normal. Language is fluent with no aphasia.     Cranial Nerves  CN II: Visual fields full to confrontation.  CN III, IV, VI: Extraocular movements intact bilaterally. Normal lids and orbits bilaterally. Pupils equal round and reactive to light bilaterally.  CN V: Facial sensation is normal.  CN VII: Full and symmetric facial movement.  CN IX, X: Palate elevates symmetrically  CN XI: Shoulder shrug strength is normal.  CN XII: Tongue midline without atrophy or fasciculations.     Motor  Normal muscle bulk throughout. No fasciculations present. Normal muscle tone.  Right upper extremity is 4+/5, right wrist is 4/5, decreased fine finger movement in the right hand  Left upper extremity and bilateral lower extremities 5/5.     Sensory  Light touch is normal in upper and lower extremities.      Coordination     No dysmetria.     Gait     Not assessed.        Physical Exam  Vitals and nursing note reviewed.   Constitutional:       Appearance: Normal appearance.   HENT:      Head: Normocephalic and atraumatic.      Mouth/Throat:      Mouth: Mucous membranes are moist.      Pharynx: Oropharynx is clear.   Eyes:      General: Lids are normal.      Extraocular Movements: Extraocular movements intact.      Pupils: Pupils are equal, round, and reactive to light.   Cardiovascular:      Rate and Rhythm: Normal rate and regular rhythm.    Pulmonary:      Effort: Pulmonary effort is normal. No respiratory distress.   Musculoskeletal:      Cervical back: Normal range of motion and neck supple.   Neurological:      Mental Status: She is alert.   Psychiatric:         Mood and Affect: Mood normal.         Speech: Speech normal.         Behavior: Behavior normal.     Results Review:    I reviewed the patient's new clinical results.  CT angiogram of head and neck shows no significant atherosclerotic disease.  Left M3 occlusion is noted.  Labs were reviewed.              Assessment/Plan     Assessment/Plan:  57-year-old right-handed white female with known diagnosis of metastatic lung cancer, breast cancer, with metastasis to bone and brain, DVT on anticoagulation with Xarelto, hyperlipidemia, who had an episode of right arm weakness and trouble with speech on Monday which resolved within 30 minutes, and again had a right upper extremity on Wednesday morning, for which she contacted the cancer team who performed outpatient MRI brain yesterday which showed a left MCA stroke for which she was admitted      1. Left middle cerebral artery stroke.  Likely secondary to hypercoagulable status.  Her CT angiogram of head and neck is negative for any significant atherosclerotic disease.  Communicated with Dr. Snell, and she agreed to switch from Xarelto to Lovenox, therapeutic dose.  Will DC aspirin.  Continue Lipitor 80 mg daily.  2. Lung cancer with metastasis to brain and bone.  Further management as per the primary team.  3. Anemia.  Her hemoglobin is 6.9 and hematocrit of 21.3.  Will discuss with primary team if okay to continue anticoagulation.  4. Mixed hyperlipidemia.  Relatively well controlled.  Continue her on full dose of statins.  5. Increase activity as tolerated.  6. Okay to give her home dose of Xanax.     Case was discussed with patient, her , nursing and primary team.  We will sign off for now, please call us with questions. Thank you for  the consult.             Barron Dowd MD  04/30/23  12:14 EDT

## 2023-04-30 NOTE — PROGRESS NOTES
LOS: 2 days     Chief Complaint: Left elbow septic olecranon bursitis    Interval History: Patient reports she feels unchanged from yesterday.  States no real change in her elbow.  Remains afebrile.  Leukocytosis improved to 14.    Vital Signs  Temp:  [97.5 °F (36.4 °C)-98 °F (36.7 °C)] 97.5 °F (36.4 °C)  Heart Rate:  [105-143] 106  Resp:  [20] 20  BP: (104-126)/(69-96) 115/80    Physical Exam:  General: In no acute distress  HEENT: Oropharynx clear, moist mucous membranes  Respiratory: Normal work of breathing  GI: Soft, NT/ND  Skin: Erythema over the left elbow with no drainage.  Extremities: Edema over the left elbow.  Access: Peripheral IV.    Antibiotics:  Anti-Infectives (From admission, onward)    Ordered     Dose/Rate Route Frequency Start Stop    04/30/23 0740  ceFAZolin in dextrose (ANCEF) IVPB solution 2 g        Ordering Provider: Herbert Zhou, DO    2 g  over 30 Minutes Intravenous Every 8 Hours 04/30/23 0830 05/07/23 0829           Results Review:     I reviewed the patient's new clinical results.    Lab Results   Component Value Date    WBC 14.28 (H) 04/30/2023    HGB 6.9 (C) 04/30/2023    HCT 21.3 (L) 04/30/2023    MCV 91.4 04/30/2023     (L) 04/30/2023     Lab Results   Component Value Date    GLUCOSE 98 04/30/2023    BUN 23 (H) 04/30/2023    CREATININE 0.99 04/30/2023    EGFRIFNONA 46 (L) 02/14/2022    EGFRIFAFRI 95 11/24/2020    BCR 23.2 04/30/2023    CO2 20.0 (L) 04/30/2023    CALCIUM 7.9 (L) 04/30/2023    PROTENTOTREF 6.7 08/02/2022    ALBUMIN 3.6 04/29/2023    LABIL2 0.8 08/02/2022    AST 9 04/29/2023    ALT 12 04/29/2023       Microbiology:  4/24 left elbow fluid culture with MSSA      Assessment    #Left olecranon septic bursitis with MSSA  #Left middle cerebral artery stroke  #CKD stage III  #Metastatic non-small cell lung cancer   #Chronic steroid use  #History of breast cancer  #Brain metastasis  #Recent DVT    Continue cefazolin 2 g every 8 hours for olecranon bursitis  with MSSA.  Plan would be for discharge on Keflex 500 mg every 6 hours to complete out prior outlined 14-day course and have orthopedic follow-up.  Blood cultures pending.  TTE pending.    ID will follow.

## 2023-04-30 NOTE — PLAN OF CARE
Goal Outcome Evaluation:      No new neuro event this shift. Remains to go into tachycardia up to 140s with activity such as getting up to the bedside commode- then normalizing up to 100s after rest. CTA completed tonight, awaiting cardiac echo.        Problem: Adult Inpatient Plan of Care  Goal: Plan of Care Review  Outcome: Ongoing, Progressing  Goal: Patient-Specific Goal (Individualized)  Outcome: Ongoing, Progressing  Goal: Absence of Hospital-Acquired Illness or Injury  Outcome: Ongoing, Progressing  Intervention: Identify and Manage Fall Risk  Recent Flowsheet Documentation  Taken 4/30/2023 0619 by Castillo Davis RN  Safety Promotion/Fall Prevention: safety round/check completed  Taken 4/30/2023 0451 by Castillo Davis RN  Safety Promotion/Fall Prevention: safety round/check completed  Taken 4/30/2023 0216 by Castillo Davis RN  Safety Promotion/Fall Prevention: safety round/check completed  Taken 4/30/2023 0055 by Castillo Davis RN  Safety Promotion/Fall Prevention: safety round/check completed  Taken 4/29/2023 2200 by Castillo Davis RN  Safety Promotion/Fall Prevention:   activity supervised   fall prevention program maintained   nonskid shoes/slippers when out of bed   safety round/check completed  Taken 4/29/2023 2042 by Castillo Davis RN  Safety Promotion/Fall Prevention: safety round/check completed  Intervention: Prevent Skin Injury  Recent Flowsheet Documentation  Taken 4/29/2023 2200 by Castillo Davis RN  Body Position:   position changed independently   side-lying   left  Skin Protection:   adhesive use limited   incontinence pads utilized   tubing/devices free from skin contact  Intervention: Prevent and Manage VTE (Venous Thromboembolism) Risk  Recent Flowsheet Documentation  Taken 4/29/2023 2200 by Castillo Davis RN  Activity Management: up to bedside commode  VTE Prevention/Management: (Xarelto) other (see comments)  Goal: Optimal Comfort and Wellbeing  Outcome:  Ongoing, Progressing  Intervention: Monitor Pain and Promote Comfort  Recent Flowsheet Documentation  Taken 4/30/2023 0111 by Castillo Davis RN  Pain Management Interventions: see MAR  Intervention: Provide Person-Centered Care  Recent Flowsheet Documentation  Taken 4/29/2023 2200 by Castillo Davis RN  Trust Relationship/Rapport:   care explained   thoughts/feelings acknowledged  Goal: Readiness for Transition of Care  Outcome: Ongoing, Progressing     Problem: Fall Injury Risk  Goal: Absence of Fall and Fall-Related Injury  Outcome: Ongoing, Progressing  Intervention: Identify and Manage Contributors  Recent Flowsheet Documentation  Taken 4/29/2023 2200 by Castillo Davis RN  Medication Review/Management: medications reviewed  Intervention: Promote Injury-Free Environment  Recent Flowsheet Documentation  Taken 4/30/2023 0619 by Castillo Davis RN  Safety Promotion/Fall Prevention: safety round/check completed  Taken 4/30/2023 0451 by Castillo Davis RN  Safety Promotion/Fall Prevention: safety round/check completed  Taken 4/30/2023 0216 by Castillo Davis RN  Safety Promotion/Fall Prevention: safety round/check completed  Taken 4/30/2023 0055 by Castillo Davis RN  Safety Promotion/Fall Prevention: safety round/check completed  Taken 4/29/2023 2200 by Castillo Davis RN  Safety Promotion/Fall Prevention:   activity supervised   fall prevention program maintained   nonskid shoes/slippers when out of bed   safety round/check completed  Taken 4/29/2023 2042 by Castillo Davis RN  Safety Promotion/Fall Prevention: safety round/check completed     Problem: Skin Injury Risk Increased  Goal: Skin Health and Integrity  Outcome: Ongoing, Progressing  Intervention: Optimize Skin Protection  Recent Flowsheet Documentation  Taken 4/29/2023 2200 by Castillo Davis RN  Pressure Reduction Techniques:   frequent weight shift encouraged   weight shift assistance provided  Pressure Reduction  Devices:   alternating pressure pump (ADD)   positioning supports utilized  Skin Protection:   adhesive use limited   incontinence pads utilized   tubing/devices free from skin contact     Problem: Cerebral Tissue Perfusion (Stroke, Ischemic/Transient Ischemic Attack)  Goal: Optimal Cerebral Tissue Perfusion  Outcome: Ongoing, Progressing     Problem: Functional Ability Impaired (Stroke, Ischemic/Transient Ischemic Attack)  Goal: Optimal Functional Ability  Outcome: Ongoing, Progressing  Intervention: Optimize Functional Ability  Recent Flowsheet Documentation  Taken 4/29/2023 2200 by Castillo Davis RN  Activity Management: up to bedside commode     Problem: Sensorimotor Impairment (Stroke, Ischemic/Transient Ischemic Attack)  Goal: Improved Sensorimotor Function  Outcome: Ongoing, Progressing  Intervention: Optimize Sensory and Perceptual Ability  Recent Flowsheet Documentation  Taken 4/29/2023 2200 by Castillo Davis RN  Pressure Reduction Techniques:   frequent weight shift encouraged   weight shift assistance provided  Pressure Reduction Devices:   alternating pressure pump (ADD)   positioning supports utilized     Problem: Urinary Elimination Impaired (Stroke, Ischemic/Transient Ischemic Attack)  Goal: Effective Urinary Elimination  Outcome: Ongoing, Progressing     Problem: Infection Progression (Sepsis/Septic Shock)  Goal: Absence of Infection Signs and Symptoms  Outcome: Ongoing, Progressing  Intervention: Promote Recovery  Recent Flowsheet Documentation  Taken 4/29/2023 2200 by Castillo Davis RN  Activity Management: up to bedside commode

## 2023-04-30 NOTE — PROGRESS NOTES
Name: Holli Patel ADMIT: 2023   : 1965  PCP: Vandana Gastelum MD    MRN: [id number] LOS: 2 days   AGE/SEX: 57 y.o. female  ROOM: [facility]     Subjective   Subjective     No events overnight. Her hgb is down to 6.9. no signs of active bleeding. Iron studies from mid march were normal.      Objective   Objective   Vital Signs  Temp:  [97.5 °F (36.4 °C)-98 °F (36.7 °C)] 97.9 °F (36.6 °C)  Heart Rate:  [] 101  Resp:  [18-20] 18  BP: (104-126)/(69-96) 113/83  SpO2:  [95 %-99 %] 99 %  on   ;   Device (Oxygen Therapy): room air  Body mass index is 20.99 kg/m².  Physical Exam  Constitutional:       General: She is not in acute distress.     Appearance: She is not toxic-appearing.   Cardiovascular:      Rate and Rhythm: Normal rate and regular rhythm.      Heart sounds: Normal heart sounds.   Pulmonary:      Effort: Pulmonary effort is normal.      Breath sounds: Normal breath sounds.   Abdominal:      General: Bowel sounds are normal.      Palpations: Abdomen is soft.   Skin:     Findings: Erythema and rash present.      Comments: Left elbow less swollen, erythematous, and warm   Neurological:      Mental Status: She is alert.   Psychiatric:         Mood and Affect: Mood normal.         Behavior: Behavior normal.         Results Review     I reviewed the patient's new clinical results.  Results from last 7 days   Lab Units 23  0534 23  0531 23  1036   WBC 10*3/mm3 14.28* 21.93* 17.39*   HEMOGLOBIN g/dL 6.9* 7.8* 8.3*   PLATELETS 10*3/mm3 128* 127* 146     Results from last 7 days   Lab Units 23  0534 23  0531 23  1036   SODIUM mmol/L 137 138 142   POTASSIUM mmol/L 4.5 4.4 4.3   CHLORIDE mmol/L 107 106 104   CO2 mmol/L 20.0* 23.0 24.2   BUN mg/dL 23* 31* 26*   CREATININE mg/dL 0.99 1.01* 1.12*   GLUCOSE mg/dL 98 82 126*   Estimated Creatinine Clearance: 62.3 mL/min (by C-G formula based on SCr of 0.99 mg/dL).  Results from last 7 days   Lab Units 23  0593  04/25/23  1036   ALBUMIN g/dL 3.6 3.6   BILIRUBIN mg/dL <0.2 <0.2*   ALK PHOS U/L 113 100   AST (SGOT) U/L 9 12   ALT (SGPT) U/L 12 16     Results from last 7 days   Lab Units 04/30/23  0534 04/29/23  0531 04/25/23  1036   CALCIUM mg/dL 7.9* 7.7* 8.9   ALBUMIN g/dL  --  3.6 3.6       COVID19   Date Value Ref Range Status   08/03/2022 Not Detected Not Detected - Ref. Range Final   11/26/2021 Not Detected Not Detected - Ref. Range Final   05/18/2021 Not Detected Not Detected - Ref. Range Final     SARS-CoV-2 PCR   Date Value Ref Range Status   04/28/2021 Not Detected Not Detected Final     Comment:     Nucleic acid specific to SARS-CoV-2 (COVID-19) virus was not detected in  this sample by the TaqPath (TM) COVID-19 Combo Kit.          SARS-CoV-2 (COVID-19) nucleic acid testing performed using [employer] Aptima (R) SARS-CoV-2 Assay or [employer] TaqPath (TM)  COVID-19 Combo Kit as indicated above under Emergency Use Authorization (EUA) from the FDA. Aptima (R) and TaqPath (TM) test performance  characteristics verified by [facility] in accordance with the FDAs Guidance Document (Policy for Diagnostic Tests for Coronavirus Disease-2019  during the Public Health Emergency) issued on March 16, 2020. The laboratory is regulated under CLIA as qualified to perform high-complexity testing  Unless otherwise noted, all testing was performed at [facility], CLIA No. [id number], [place] State Licensee No. [id number]     Hemoglobin A1C   Date/Time Value Ref Range Status   04/29/2023 0531 5.00 4.80 - 5.60 % Final     Glucose   Date/Time Value Ref Range Status   04/29/2023 0435 73 70 - 130 mg/dL Final     Comment:     Meter: [id number] : [id number] Henry EDMONDSON   04/28/2023 1651 72 70 - 130 mg/dL Final     Comment:     Meter: [id number] : [id number] Hatchett Sherrish NA       CT Angiogram Carotids  Narrative: Patient: TANIYA CARPENTER  Time Out: 01:52  Exam(s): CTA CAROTIDS     EXAM:    CT Angiography Neck With  Intravenous Contrast    CLINICAL HISTORY:    None provided.    TECHNIQUE:    Routine carotid CT angiography protocol was performed with intravenous   contrast.  NASCET criteria using the distal ICAs for comparison were used   for evaluation of stenoses.  CTDI is 37.79 mGy and DLP is 1356.8 mGy-cm.    This CT exam was performed according to the principle of ALARA (As Low As   Reasonably Achievable) by using one or more of the following dose   reduction techniques: automated exposure control, adjustment of the mA   and or kV according to patient size, and or use of iterative   reconstruction technique.    3D reconstructed images were created and reviewed.    COMPARISON:    None.    FINDINGS:     VASCULATURE:    Right common carotid artery:  Unremarkable.  No occlusion or   significant stenosis.  No dissection.    Right internal carotid artery: Minimal atherosclerotic calcification of   the carotid bifurcation.  Extracranial segment is patent with no   occlusion or significant stenosis.  No dissection.    Right external carotid artery:  Unremarkable.  No occlusion.    Right vertebral artery:  Unremarkable.  No occlusion or significant   stenosis.  No dissection.      Left common carotid artery:  Unremarkable.  No occlusion or significant   stenosis.  No dissection.    Left internal carotid artery: Minimal atherosclerotic calcification of   the carotid bifurcation..  Extracranial segment is patent with no   occlusion or significant stenosis.  No dissection.    Left external carotid artery:  Unremarkable.  No occlusion.    Left vertebral artery:  The distal left vertebral artery demonstrates   short segment occlusion at the level of C2 with distal reconstitution.    No occlusion or significant stenosis.    Brachiocephalic and subclavian arteries:  There is a normal branching   pattern of the proximal great vessels with only minimal hyperplasia noted   proximally.  No critical ostial stenosis.    Aorta:  The aortic arch  is normal in caliber with mild atherosclerotic   changes.     NECK:    Bones joints:  Unremarkable.    Soft tissues:  Unremarkable.    Lung apices:  Centrilobular emphysematous changes noted at the lung   apices.  Subsegmental opacities in the left upper lobe laterally.    Tubes, lines and devices:  A right subclavian portacatheter is noted.     CAROTID STENOSIS REFERENCE USING NASCET CRITERIA:    % ICA stenosis = (1 - narrowest ICA diameter diameter of distal   cervical ICA) x 100.    Mild - <50% stenosis.    Moderate - 50-69% stenosis.    Severe - 70-94% stenosis.    Near occlusion - 95-99% stenosis.    Occluded - 100% stenosis.    IMPRESSION:       1.  The distal left vertebral artery demonstrates short segment occlusion   at the level of C2 with distal reconstitution.  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  2.  The right vertebral artery is widely patent.  3.  The common and internal carotid arteries are patent without occlusion,   dissection or high-grade stenosis.  Impression: Electronically signed by Ted Quiroz MD on 04-30-23 at 0152  CT Angiogram Head  Narrative: Patient: TANIYA CARPENTER  Time Out: 01:48  Exam(s): CTA HEAD     EXAM:    CT Angiography Head Without and With Intravenous Contrast    CLINICAL HISTORY:    LMCA stroke.    TECHNIQUE:  AI analysis of LVO was utilized    Axial computed tomographic angiography images of the head without and   with intravenous contrast.  CTDI is 147.13 mGy and DLP is 3335 mGy-cm.    This CT exam was performed according to the principle of ALARA (As Low As   Reasonably Achievable) by using one or more of the following dose   reduction techniques: automated exposure control, adjustment of the mA   and or kV according to patient size, and or use of iterative   reconstruction technique.    3D reconstructed images were created and reviewed.    COMPARISON:    MRI dated 4 28 2023    FINDINGS:     VASCULATURE:     Right internal carotid artery:  No acute findings.  Intracranial   segment is patent with no significant stenosis.  No aneurysm.    Right anterior cerebral artery:  Unremarkable.  No occlusion or   significant stenosis.  No aneurysm.    Right middle cerebral artery:  Unremarkable.  No occlusion or   significant stenosis.  No aneurysm.    Right posterior cerebral artery:  Unremarkable.  No occlusion or   significant stenosis.  No aneurysm.    Right vertebral artery:  The distal right vertebral artery is dominant   in size.      Left internal carotid artery:  No acute findings.  Intracranial segment   is patent with no significant stenosis.  No aneurysm.    Left anterior cerebral artery:  Unremarkable.  No occlusion or   significant stenosis.  No aneurysm.    Left middle cerebral artery:  The left M1 segment is patent with only   mild narrowing proximally.  The left M2 branches are patent without   occlusion or narrowing.  No aneurysm.    Left posterior cerebral artery:  Unremarkable.  No occlusion or   significant stenosis.  No aneurysm.    Left vertebral artery:  The distal left vertebral artery is small in   caliber but is patent.      Basilar artery:  Unremarkable.  No occlusion or significant stenosis.    No aneurysm.     HEAD:    Brain:  Precontrast imaging demonstrates a hypodense area in the   subcortical white matter of the left parietal region.  No intracranial   hemorrhage.  No significant mass effect.    Ventricles:  Unremarkable.  No ventriculomegaly.    Bones joints:  No acute fracture.    Soft tissues:  Unremarkable.    Sinuses:  Unremarkable as visualized.  No acute sinusitis.    Mastoid air cells:  Unremarkable as visualized.  No mastoid effusion.    IMPRESSION:       1.  No thrombosis or significant stenosis of the left internal carotid or   proximal middle cerebral segments to suggest the known ischemic area   noted on the previous MRI examination.  2.  Otherwise negative intracranial CTA  examination.  3.  Precontrast examination demonstrates no significant mass effect or   midline shift.  No hemorrhagic transformation.  Impression: Electronically signed by Ted Quiroz MD on 04-30-23 at 0148    Scheduled Medications  atorvastatin, 80 mg, Oral, Nightly  ceFAZolin, 2 g, Intravenous, Q8H  dexamethasone, 2 mg, Oral, Daily  [START ON 5/1/2023] enoxaparin, 1 mg/kg, Subcutaneous, Q12H  folic acid, 1,000 mcg, Oral, Daily  letrozole, 2.5 mg, Oral, Daily  oxybutynin, 5 mg, Oral, TID  pantoprazole, 40 mg, Oral, Q AM  potassium chloride, 20 mEq, Oral, Daily  senna-docusate sodium, 2 tablet, Oral, BID  sodium chloride, 10 mL, Intravenous, Q12H  sodium chloride, 10 mL, Intravenous, Q12H    Infusions   Diet  Diet: Regular/House Diet; Texture: Regular Texture (IDDSI 7); Fluid Consistency: Thin (IDDSI 0)       Assessment/Plan     Active Hospital Problems    Diagnosis  POA   • **Abnormal MRI of head [R93.0]  Unknown   • MSSA infection, non-invasive [A49.01]  Unknown   • Olecranon bursa abscess, left [M71.022]  Unknown   • Abnormal finding on MRI of brain [R90.89]  Yes   • Metastasis to brain [C79.31]  Yes   • Polyneuropathy [G62.9]  Yes   • Ataxia [R27.0]  Yes   • Bony metastasis [C79.51]  Yes   • Stage 3a chronic kidney disease [N18.31]  Yes   • History of bilateral breast cancer [Z85.3]  Not Applicable   • 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       57 y.o. female admitted with Abnormal MRI of head.    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 CTA head and neck was without any significant atherosclerotic disease. Neurology recommends changing xarelto to lovenox. Continue high intensity  statin. Echocardiogram is still pending. .  · Left elbow olecranon bursitis with culture positive for MSSA-on cefazolin while hospitalized with plans to transition to keflex 500 mg q6h at discharge to complete a 14 day course of therapy. Follow blood cultures.   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 Iron studies in mid march were normal. No reported bleeding at the moment. hgb is down to 6.9 today. Will transfuse 1 unit prbc  · Chronic leukocytosis-she has had a leukocytosis at least since February. She is on dexamethasone chronically. Greatly improved today with administration of iv cefazolin   · Acute left soleal DVT 3/21/23-on xarelto at home, but given new embolic strokes, she will be switched to lovenox  · GERD-ppi  · History of breast cancer-on letrozole   · Chemotherapy induced neuropathy  · Cancer related pain-roxicodone  · Anxiety-alprazolam  · therapeutic lovenox for DVT prophylaxis.  · Full code.  · Discussed with patient, spouse and consulting provider.  · Anticipate discharge home with family timing yet to be determined.      Piotr Siu MD  [facility]  04/30/23  12:21 EDT    I wore protective equipment throughout this patient encounter including a face mask, gloves and protective eyewear.  Hand hygiene was performed before donning protective equipment and after removal when leaving the room.

## 2023-04-30 NOTE — CONSULTS
Subjective     REASON FOR CONSULTATION:    Evaluation and management for metastatic non-small cell lung cancer                             REQUESTING PHYSICIAN:  Dr. Salbador MD    RECORDS OBTAINED:  Records of the patients history including those obtained from the referring provider were reviewed and summarized in detail.    HISTORY OF PRESENT ILLNESS:  The patient is a 57 y.o. year old female with medical history significant for metastatic non-small cell lung cancer, adenocarcinoma, history of breast cancer, history of DVT on anticoagulation, hypothyroidism and hypertension had called oncology office with acute right-sided weakness.    An MRI brain was performed on 4/20/2023, which noted multiple areas of small acute infarct in the left middle cerebral artery territory.  Patient was advised to come to the ER and get admitted for further evaluation and management.  She has been seen by the stroke neurology team.  Further work-up with CTA head and carotids performed.     Hematology/oncology consulted for further assistance with management.    Oncologic history:    Metastatic non-small cell lung cancer  • She was initially diagnosed with stage III (T1N2M0) disease.    • She received initial therapy with cisplatin and Alimta concurrent with radiation therapy beginning 5/25/2021.    • There were indeterminate findings in the lumbar spine at L1 and it was recommended to monitor this over time.    • Patient completed 3 cycles cisplatin and Alimta 7/7/2021 and completed radiation therapy 7/12/2021.    • PET scan 8/6/2021 showed good response to treatment.    • MRI lumbar spine 10/1/2021 with increase in size of the L1 lesion.    • Biopsy of the L1 lesion on 10/14/2022 confirmed metastatic adenocarcinoma of lung origin, PD-L1 TPS 0.    • PET scan 10/26/2021 with involvement of left adrenal and L1/L2 only.    • Patient received radiation therapy to L1/L2 on 11/19/2021.  She also received radiation to the left adrenal  gland.    • She initiated further systemic therapy on 11/22/2021 with Keytruda and Alimta.   • Follow-up PET scan 1/18/2022 with decrease in small left upper lobe pulmonary nodule, resolution of activity at L1/L2, no new sites of disease.    • Guardant 360 CT DNA level was monitored and was decreasing.    • PET scan 4/11/2022 with progression in left adrenal and L1.    • Maintained systemic therapy with Keytruda and Alimta and received additional radiation to left adrenal and L1.  Alimta however held since 5/10/2022 due to renal dysfunction.  Radiation completed to lumbar spine 7/14/2022.    • PET scan 7/18/2022 with multiple areas of progression of the spine and right sacrum.    • Guardant 360 analysis 7/20/2022 with no actionable mutations.    • Change in therapy to single agent palliative Taxotere initiated 7/26/2022.    • Altered mental status and febrile neutropenia requiring hospitalization 8/2 - 8/9/2022.  During that admission, MRI brain, cervical, thoracic, lumbar spine performed 8/4/2022 in addition to CT cervical spine 8/5/2022.  There was evidence of C2 metastatic lesion with some dural enhancement, compression deformity at T7 with mild edema suggesting acute to subacute fracture, 5 mm T12 spinous process metastasis, multifocal disease in the lumbar spine and sacrum, largest involving sacral ala to the right 5 cm in transverse dimension.  Loss of height at L2 25%.  • Patient received cycle 3 Taxotere 9/6/2022 with Neulasta support.    • She has been continuing as well on Xgeva every 4 weeks (last received 9/6/2022).  • Patient did undergo MRI lumbar spine and pelvis on 9/24/2022.  MRI pelvis showed bilateral sacral fractures with marrow infiltration related to metastasis more prominent on the right.  Lesion on the right 3.5 x 4.1 x 4.3 cm.  Also probable metastasis along posterior acetabulum on the right 1.5 cm.  MRI lumbar spine with stable L1 metastasis, new edema endplate T12 possibly stress reaction  versus developing new metastasis, lesion at T12 spinous process unchanged.   • Radiation oncology consulted with plans for palliative treatment of the right sacral metastasis.  Treatment initiated 9/28/2022  • Completed radiation to the right sacral metastasis on 10/11/2022.  • PET/CT 10/25/2022 without any metabolic uptake in the lung or the skeletal metastasis.  This is indicative of a positive response to Taxotere.  • Pain persistent sitting and walking position and not present when she is laying down.  • Patient is still very active and performance status is very poor.  Due to this reason chemotherapy will be continued to be held.  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  • Due to this reason an MRI of the brain was obtained but unfortunately this was performed without contrast.  Reviewed the MRI and no evidence of metastatic disease.  • 11/15/2022 lumbar puncture shows no evidence of malignancy in the CSF, elevated protein at 65.2, glucose normal at 58, culture no growth in 3 days  • Repeat MRI of the brain with contrast 11/21/2022 does not show any obvious abnormalities.  There is no enhancement of the CSF.  MRI of the cervical spine with no abnormalities.  • Patient unable to undergo MRI of the thoracic and lumbar spine due to mental status and unable to being in 1 position as well as with incontinence.  • 1/4/2023-mental status has improved significantly.  Patient reports decreased oral intake and nausea and vomiting.  She is also reporting abdominal discomfort.  • 1/12/2023-PET/CT with mediastinal lymphadenopathy as well as multiple liver lesions suggestive of disease progression.  • 1/23/2023-MRI of the brain with at least 9 different foci of small mets measuring up to 6 mm.  There is also areas of encephalomalacia concerning for chronic infarcts.    • Resumed Taxotere 1/31/2023.  Dose reduced to 60 mg per metered  square.  • 3/27/2023-PET/CT shows significant response in the chest as well as the hepatic lesions.  • 3/28/2023-MRI of the brain-improvement in the metastatic disease to the brain with decrease in the size as well as decrease in enhancement noted.  • Given that there is improvement in the disease we will continue with Taxotere.  • It is unclear to me as to why the disease in the brain has responded given the fact that she did not receive radiation or any treatment which would potentially cross the blood-brain barrier.  • We will continue with surveillance MRIs and PET CTs  • Tolerating Taxotere overall well with stable neuropathy.  Continue  Past Medical History:   Diagnosis Date   • Anxiety    • Clot     POSSIBLE BLOOD CLOT IN VENOUS ACCESS DEVICE-PT STATES WAS STARTED ON ELIQUIS    • Dyspnea on exertion    • Elevated cholesterol    • Frequent urination     DAY AND NIGHT   • SHAYY (generalized anxiety disorder)     RELATED HIGH BLOOD PRESSURE   • GERD (gastroesophageal reflux disease)    • High blood pressure     ANXIETY RELATED   • Hyperlipidemia    • Hypothyroidism    • Lung cancer    • Lung nodule     LEFT   • Malignant neoplasm of upper-outer quadrant of right breast in female, estrogen receptor positive 4/13/2021    PT STATES HAS BILAT BREAST CANCER   • Migraine    • PONV (postoperative nausea and vomiting)         Past Surgical History:   Procedure Laterality Date   • BREAST BIOPSY Bilateral 04/07/2021    Right Breast 10 o'clock & Left breast 10 o'clock 6 cm from nipple, [facility]   • CLOSED REDUCTION HIP DISLOCATION Left 4/30/2021    Procedure: BRONCHOSCOPY, LEFT VIDEO ASSITED THORACOSCOPY, ROBOTIC ASSSITED MEDIASTINAL LYMPHADENECTOMY WITH INTERCOSTAL NERVE BLOCKS;  Surgeon: Raphael Kerr III, MD;  Location: [facility];  Service: [facility];  Laterality: Left;   • COLONOSCOPY     • EPIDURAL Bilateral 12/28/2022    Procedure: LUMBAR/SACRAL TRANSFORAMINAL EPIDURAL Bilateral L5-S1;  Surgeon: Breann Santiago MD;   Location: [facility] MAIN [facility];  Service: Pain Management;  Laterality: Bilateral;   • EPIDURAL Right 2/13/2023    Procedure: LUMBAR/SACRAL TRANSFORAMINAL EPIDURAL Right L5-S1 97101 (Hold eliqus 3 days);  Surgeon: Breann Santiago MD;  Location: [facility] MA[facility] OR;  Service: Pain Management;  Laterality: Right;   • TUBAL ABDOMINAL LIGATION     • VENOUS ACCESS DEVICE (PORT) INSERTION Right 5/20/2021    Procedure: INSERTION VENOUS ACCESS DEVICE;  Surgeon: Raphael Kerr III, MD;  Location: [facility] MAIN OR;  Service: Thoracic;  Laterality: Right;   • VENOUS ACCESS DEVICE (PORT) INSERTION Right 11/29/2021    Procedure: REVISION AND REPLACEMENT RIGHT SUBCLAVIAN VENOUS ACCESS PORT;  Surgeon: Raphael Kerr III, MD;  Location: [facility] MAIN OR;  Service: Thoracic;  Laterality: Right;   • WRIST SURGERY Right         No current facility-administered medications on file prior to encounter.     Current Outpatient Medications on File Prior to Encounter   Medication Sig Dispense Refill   • [START ON 5/19/2023] ALPRAZolam (XANAX) 0.5 MG tablet 1 BY MOUTH EVERY 6 HOURS, CHANGE IN QUANTITY, METASTATIC CANCER 120 tablet 2   • dexamethasone (DECADRON) 2 MG tablet Take 1 tablet by mouth Daily. Take at noon 30 tablet 3   • dexamethasone (DECADRON) 4 MG tablet Take 2 tablets oral twice a day for 3 consecutive days beginning the day before chemotherapy and continue for 6 doses. 12 tablet 3   • folic acid (FOLVITE) 1 MG tablet TAKE 1 TABLET BY MOUTH EVERY DAY 90 tablet 1   • letrozole (FEMARA) 2.5 MG tablet Take 1 tablet by mouth Daily. 90 tablet 3   • naloxone (Narcan) 4 MG/0.1ML nasal spray 1 spray into the nostril(s) as directed by provider As Needed (high dose mme or any narcotic with benzo). (Patient taking differently: 1 spray into the nostril(s) as directed by provider As Needed (high dose mme or any narcotic with benzo). Has not use) 1 each 1   • omeprazole (PrilOSEC) 20 MG capsule Take 1 capsule by mouth 2 (Two) Times a Day. 180 capsule 1    • ondansetron (Zofran) 8 MG tablet Take 1 tablet by mouth Every 8 (Eight) Hours As Needed for Nausea or Vomiting. 90 tablet 3   • oxyCODONE (ROXICODONE) 15 MG immediate release tablet Take 1 tablet by mouth Every 4 (Four) Hours As Needed for Moderate Pain. 180 tablet 0   • potassium chloride (MICRO-K) 10 MEQ CR capsule TAKE 2 CAPSULES BY MOUTH EVERY DAY 60 capsule 1   • rivaroxaban (XARELTO) 20 MG tablet Take 1 tablet by mouth Daily. 14 tablet 0   • sulfamethoxazole-trimethoprim (Bactrim DS) 800-160 MG per tablet Take 2 tablets by mouth 2 (Two) Times a Day. 40 tablet 0   • oxybutynin (DITROPAN) 5 MG tablet Take 1 tablet by mouth 3 (Three) Times a Day. 90 tablet 1   • rizatriptan (MAXALT) 10 MG tablet Take 1 tablet by mouth 1 (One) Time for 1 dose. AT ONSET OF HEADACHE MAY REPEAT ONE TABLET IN 2 HOURS IF NEEDED 12 tablet 5        ALLERGIES:  No Known Allergies     Social History     Socioeconomic History   • Marital status:      Spouse name: Jelani   Tobacco Use   • Smoking status: Former     Packs/day: 1.00     Years: 30.00     Pack years: 30.00     Types: Electronic Cigarette, Cigarettes     Start date: 1981     Quit date:      Years since quittin.3   • Smokeless tobacco: Never   • Tobacco comments:     ABT 15 CIGARETTES DAILY   Vaping Use   • Vaping Use: Some days   • Substances: Nicotine, Flavoring   • Devices: Disposable   Substance and Sexual Activity   • Alcohol use: Never   • Drug use: Never   • Sexual activity: Yes     Partners: Male        Family History   Problem Relation Age of Onset   • Cancer Father         LYMPHATIC   • Prostate cancer Father    • Lymphoma Father    • Alcohol abuse Brother    • Colon cancer Maternal Grandmother    • Malig Hyperthermia Neg Hx         Review of Systems   GENERAL: No change in appetite or weight;   No fevers, chills, sweats.    SKIN: No nonhealing lesions.   No rashes.  HEME/LYMPH: No easy bruising, bleeding.   No swollen nodes.   EYES: No vision  changes or diplopia.   ENT: No tinnitus, hearing loss, gum bleeding, epistaxis, hoarseness or dysphagia.   RESPIRATORY: No cough, shortness of breath, hemoptysis or wheezing.   CVS: No chest pain, palpitations, orthopnea, dyspnea on exertion or PND.   GI: No melena or hematochezia.   No abdominal pain.  No nausea, vomiting, constipation, diarrhea  : No lower tract obstructive symptoms, dysuria or hematuria.   MUSCULOSKELETAL: No bone pain.  No joint stiffness.   NEUROLOGICAL: Right-sided weakness  PSYCHIATRIC: No increased nervousness, mood changes or depression.         Objective     Vitals:    04/29/23 1541 04/29/23 1806 04/29/23 2147 04/30/23 0527   BP:  126/84 104/69 115/80   BP Location:  Left arm Right arm Left arm   Patient Position:  Sitting Lying Lying   Pulse: 120 (!) 143 105 106   Resp:  20 20 20   Temp:  98 °F (36.7 °C) 97.9 °F (36.6 °C) 97.5 °F (36.4 °C)   TempSrc:  Oral Oral Oral   SpO2:  95% 97% 98%         4/25/2023    10:57 AM   Current Status   ECOG score 1       Physical Exam    CONSTITUTIONAL:  Vital signs reviewed.  No distress, looks comfortable.  EYES:  Conjunctiva and lids unremarkable.    EARS,NOSE,MOUTH,THROAT:  Ears and nose appear unremarkable.    RESPIRATORY:  Normal respiratory effort.  Lungs clear to auscultation bilaterally.  CARDIOVASCULAR:  Normal S1, S2.  No murmurs rubs or gallops.  No significant lower extremity edema.  GASTROINTESTINAL: Abdomen appears unremarkable.  Nondistended   LYMPHATIC:  No cervical, supraclavicular lymphadenopathy.  SKIN:  Warm.  No rashes.  PSYCHIATRIC:  Normal judgment and insight.  Normal mood and affect.  NEURO: AAOx3, right-sided weakness.    RECENT LABS:  Hematology WBC   Date Value Ref Range Status   04/30/2023 14.28 (H) 3.40 - 10.80 10*3/mm3 Final     RBC   Date Value Ref Range Status   04/30/2023 2.33 (L) 3.77 - 5.28 10*6/mm3 Final     Hemoglobin   Date Value Ref Range Status   04/30/2023 6.9 (C) 12.0 - 15.9 g/dL Final     Hematocrit   Date  Value Ref Range Status   04/30/2023 21.3 (L) 34.0 - 46.6 % Final     Platelets   Date Value Ref Range Status   04/30/2023 128 (L) 140 - 450 10*3/mm3 Final          Assessment & Plan   Ms. Bettencourt is a pleasant 57-year-old female with medical history significant for metastatic non-small cell lung cancer, adenocarcinoma, history of breast cancer, history of DVT on anticoagulation, hypothyroidism and hypertension admitted with left cerebellar embolic stroke.      #Left middle cerebral artery stroke:  · Likely hypercoagulable from malignancy.  Of note, patient was on Xarelto (taking inadvertently 20 mg twice daily).  This is likely failure of Xarelto.  · Anticoagulation switched to Lovenox 1 mg/kg twice daily dose.  · Started on Lipitor 80 mg daily.  Neurology on board    #Metastatic non-small cell lung cancer, adenocarcinoma:  · Follows up with Dr. Snell in our practice.  · Currently on treatment with docetaxel.  Last dose (cycle 8) on 4/25/2023.  · The MRI from this admission note mild progression (by 1-2 mm) in some brain mets, while resolution of some other mets.  Appears to have mixed response.  · Given excellent response to docetaxel so far, patient would likely be continued on current treatment.    #Normocytic anemia:  · Hemoglobin 6.9 on 4/30.  Likely recent chemotherapy related.  · Will check iron, B12 and folate levels  · Transfuse to keep hemoglobin > 7.    #Thrombocytopenia:  · Platelet count 128,000 on 4/30/2023.  Likely recent chemo related.  Monitor.    #Leukocytosis: Likely reactive vs left elbow infection    #Left elbow olecranon bursitis:   -S/p drainage.  Cultures growing MSSA  -On IV cefazolin per ID.    #Acute LLE DVT:  -Diagnosed 3/21/2023  -On Lovenox as above    Plan:  -Switch anticoagulation from Xarelto to Lovenox 1 mg/kg twice daily  -Continue Lipitor.  Rest management per neurology  -Anemia work-up  -IV cefazolin as per ID  -Aggressive PT/OT  -Will continue to follow

## 2023-04-30 NOTE — PLAN OF CARE
Problem: Adult Inpatient Plan of Care  Goal: Plan of Care Review  Outcome: Ongoing, Progressing  Goal: Patient-Specific Goal (Individualized)  Outcome: Ongoing, Progressing  Goal: Absence of Hospital-Acquired Illness or Injury  Outcome: Ongoing, Progressing  Goal: Optimal Comfort and Wellbeing  Outcome: Ongoing, Progressing  Goal: Readiness for Transition of Care  Outcome: Ongoing, Progressing     Problem: Fall Injury Risk  Goal: Absence of Fall and Fall-Related Injury  Outcome: Ongoing, Progressing     Problem: Skin Injury Risk Increased  Goal: Skin Health and Integrity  Outcome: Ongoing, Progressing     Problem: Cerebral Tissue Perfusion (Stroke, Ischemic/Transient Ischemic Attack)  Goal: Optimal Cerebral Tissue Perfusion  Outcome: Ongoing, Progressing     Problem: Urinary Elimination Impaired (Stroke, Ischemic/Transient Ischemic Attack)  Goal: Effective Urinary Elimination  Outcome: Ongoing, Progressing     Problem: Infection Progression (Sepsis/Septic Shock)  Goal: Absence of Infection Signs and Symptoms  Outcome: Ongoing, Progressing   Goal Outcome Evaluation:

## 2023-05-01 ENCOUNTER — READMISSION MANAGEMENT (OUTPATIENT)
Dept: CALL CENTER | Facility: HOSPITAL | Age: 58
End: 2023-05-01
Payer: COMMERCIAL

## 2023-05-01 VITALS
DIASTOLIC BLOOD PRESSURE: 98 MMHG | TEMPERATURE: 98.4 F | HEIGHT: 69 IN | BODY MASS INDEX: 21.06 KG/M2 | HEART RATE: 79 BPM | OXYGEN SATURATION: 100 % | WEIGHT: 142.2 LBS | SYSTOLIC BLOOD PRESSURE: 142 MMHG | RESPIRATION RATE: 18 BRPM

## 2023-05-01 PROBLEM — I63.9 ACUTE CVA (CEREBROVASCULAR ACCIDENT): Status: ACTIVE | Noted: 2023-05-01

## 2023-05-01 LAB
ANION GAP SERPL CALCULATED.3IONS-SCNC: 11 MMOL/L (ref 5–15)
BH BB BLOOD EXPIRATION DATE: NORMAL
BH BB BLOOD TYPE BARCODE: 5100
BH BB DISPENSE STATUS: NORMAL
BH BB PRODUCT CODE: NORMAL
BH BB UNIT NUMBER: NORMAL
BUN SERPL-MCNC: 29 MG/DL (ref 6–20)
BUN/CREAT SERPL: 27.4 (ref 7–25)
CALCIUM SPEC-SCNC: 8.3 MG/DL (ref 8.6–10.5)
CHLORIDE SERPL-SCNC: 108 MMOL/L (ref 98–107)
CO2 SERPL-SCNC: 19 MMOL/L (ref 22–29)
CREAT SERPL-MCNC: 1.06 MG/DL (ref 0.57–1)
CROSSMATCH INTERPRETATION: NORMAL
DEPRECATED RDW RBC AUTO: 51.7 FL (ref 37–54)
EGFRCR SERPLBLD CKD-EPI 2021: 60.6 ML/MIN/1.73
ERYTHROCYTE [DISTWIDTH] IN BLOOD BY AUTOMATED COUNT: 16.8 % (ref 12.3–15.4)
FOLATE SERPL-MCNC: 17 NG/ML (ref 4.78–24.2)
GLUCOSE SERPL-MCNC: 96 MG/DL (ref 65–99)
HCT VFR BLD AUTO: 25.5 % (ref 34–46.6)
HGB BLD-MCNC: 8.3 G/DL (ref 12–15.9)
MCH RBC QN AUTO: 27.6 PG (ref 26.6–33)
MCHC RBC AUTO-ENTMCNC: 32.5 G/DL (ref 31.5–35.7)
MCV RBC AUTO: 84.7 FL (ref 79–97)
PLATELET # BLD AUTO: 136 10*3/MM3 (ref 140–450)
PMV BLD AUTO: 10.2 FL (ref 6–12)
POTASSIUM SERPL-SCNC: 4.2 MMOL/L (ref 3.5–5.2)
RBC # BLD AUTO: 3.01 10*6/MM3 (ref 3.77–5.28)
SODIUM SERPL-SCNC: 138 MMOL/L (ref 136–145)
UNIT  ABO: NORMAL
UNIT  RH: NORMAL
VIT B12 BLD-MCNC: 485 PG/ML (ref 211–946)
WBC NRBC COR # BLD: 11.55 10*3/MM3 (ref 3.4–10.8)

## 2023-05-01 PROCEDURE — 97530 THERAPEUTIC ACTIVITIES: CPT

## 2023-05-01 PROCEDURE — 80048 BASIC METABOLIC PNL TOTAL CA: CPT | Performed by: STUDENT IN AN ORGANIZED HEALTH CARE EDUCATION/TRAINING PROGRAM

## 2023-05-01 PROCEDURE — 85027 COMPLETE CBC AUTOMATED: CPT | Performed by: STUDENT IN AN ORGANIZED HEALTH CARE EDUCATION/TRAINING PROGRAM

## 2023-05-01 PROCEDURE — 99232 SBSQ HOSP IP/OBS MODERATE 35: CPT | Performed by: STUDENT IN AN ORGANIZED HEALTH CARE EDUCATION/TRAINING PROGRAM

## 2023-05-01 PROCEDURE — 25010000002 CEFAZOLIN IN DEXTROSE 2-4 GM/100ML-% SOLUTION: Performed by: STUDENT IN AN ORGANIZED HEALTH CARE EDUCATION/TRAINING PROGRAM

## 2023-05-01 PROCEDURE — 96372 THER/PROPH/DIAG INJ SC/IM: CPT

## 2023-05-01 PROCEDURE — G0378 HOSPITAL OBSERVATION PER HR: HCPCS

## 2023-05-01 PROCEDURE — 25010000002 HEPARIN LOCK FLUSH PER 10 UNITS: Performed by: HOSPITALIST

## 2023-05-01 PROCEDURE — 25010000002 ENOXAPARIN PER 10 MG: Performed by: INTERNAL MEDICINE

## 2023-05-01 PROCEDURE — 99232 SBSQ HOSP IP/OBS MODERATE 35: CPT | Performed by: INTERNAL MEDICINE

## 2023-05-01 PROCEDURE — 82746 ASSAY OF FOLIC ACID SERUM: CPT | Performed by: INTERNAL MEDICINE

## 2023-05-01 PROCEDURE — 82607 VITAMIN B-12: CPT | Performed by: INTERNAL MEDICINE

## 2023-05-01 RX ORDER — ATORVASTATIN CALCIUM 80 MG/1
80 TABLET, FILM COATED ORAL NIGHTLY
Qty: 30 TABLET | Refills: 0 | Status: SHIPPED | OUTPATIENT
Start: 2023-05-01 | End: 2023-05-30 | Stop reason: SDUPTHER

## 2023-05-01 RX ORDER — HEPARIN SODIUM (PORCINE) LOCK FLUSH IV SOLN 100 UNIT/ML 100 UNIT/ML
100 SOLUTION INTRAVENOUS ONCE
Status: COMPLETED | OUTPATIENT
Start: 2023-05-01 | End: 2023-05-01

## 2023-05-01 RX ORDER — CEPHALEXIN 500 MG/1
500 CAPSULE ORAL 4 TIMES DAILY
Qty: 48 CAPSULE | Refills: 0 | Status: ON HOLD | OUTPATIENT
Start: 2023-05-01

## 2023-05-01 RX ORDER — ENOXAPARIN SODIUM 100 MG/ML
1 INJECTION SUBCUTANEOUS EVERY 12 HOURS
Qty: 60 ML | Refills: 0 | Status: SHIPPED | OUTPATIENT
Start: 2023-05-01 | End: 2023-05-01 | Stop reason: SDUPTHER

## 2023-05-01 RX ORDER — CEPHALEXIN 500 MG/1
500 CAPSULE ORAL 4 TIMES DAILY
Qty: 48 CAPSULE | Refills: 0 | Status: SHIPPED | OUTPATIENT
Start: 2023-05-01 | End: 2023-05-01 | Stop reason: SDUPTHER

## 2023-05-01 RX ORDER — ATORVASTATIN CALCIUM 80 MG/1
80 TABLET, FILM COATED ORAL NIGHTLY
Qty: 30 TABLET | Refills: 0 | Status: SHIPPED | OUTPATIENT
Start: 2023-05-01 | End: 2023-05-01 | Stop reason: SDUPTHER

## 2023-05-01 RX ORDER — ENOXAPARIN SODIUM 100 MG/ML
1 INJECTION SUBCUTANEOUS EVERY 12 HOURS
Qty: 80 ML | Refills: 0 | Status: ON HOLD | OUTPATIENT
Start: 2023-05-01

## 2023-05-01 RX ADMIN — OXYCODONE HYDROCHLORIDE 15 MG: 15 TABLET ORAL at 14:09

## 2023-05-01 RX ADMIN — OXYCODONE HYDROCHLORIDE 15 MG: 15 TABLET ORAL at 08:42

## 2023-05-01 RX ADMIN — CEFAZOLIN SODIUM 2 G: 2 INJECTION, SOLUTION INTRAVENOUS at 08:41

## 2023-05-01 RX ADMIN — CEFAZOLIN SODIUM 2 G: 2 INJECTION, SOLUTION INTRAVENOUS at 01:09

## 2023-05-01 RX ADMIN — OXYCODONE HYDROCHLORIDE 15 MG: 15 TABLET ORAL at 01:09

## 2023-05-01 RX ADMIN — Medication 10 ML: at 10:06

## 2023-05-01 RX ADMIN — DEXAMETHASONE 2 MG: 2 TABLET ORAL at 12:26

## 2023-05-01 RX ADMIN — Medication 10 ML: at 08:42

## 2023-05-01 RX ADMIN — DOCUSATE SODIUM 50MG AND SENNOSIDES 8.6MG 2 TABLET: 8.6; 5 TABLET, FILM COATED ORAL at 08:42

## 2023-05-01 RX ADMIN — Medication 1000 MCG: at 08:42

## 2023-05-01 RX ADMIN — LETROZOLE 2.5 MG: 2.5 TABLET ORAL at 08:42

## 2023-05-01 RX ADMIN — HEPARIN 100 UNITS: 100 SYRINGE at 15:18

## 2023-05-01 RX ADMIN — OXYBUTYNIN CHLORIDE 5 MG: 5 TABLET ORAL at 08:42

## 2023-05-01 RX ADMIN — ENOXAPARIN SODIUM 60 MG: 100 INJECTION SUBCUTANEOUS at 08:41

## 2023-05-01 RX ADMIN — PANTOPRAZOLE SODIUM 40 MG: 40 TABLET, DELAYED RELEASE ORAL at 05:40

## 2023-05-01 RX ADMIN — ALPRAZOLAM 0.5 MG: 0.5 TABLET ORAL at 01:09

## 2023-05-01 NOTE — NURSING NOTE
Wound/ostomy - consult received regarding L elbow abscess. Chart notes reviewed and patient is being treated for Left elbow olecranon bursitis with MSSA with redness and swelling, is on IV antibiotics, no mention of drainage or any wounds requiring topical wound care.

## 2023-05-01 NOTE — OUTREACH NOTE
Prep Survey    Flowsheet Row Responses   [facility] facility patient discharged from? [facility]   Is LACE score < 7 ? No   Eligibility [facility]   Date of Admission 04/28/23   Date of Discharge 05/01/23   Discharge Disposition Home or Self Care   Discharge diagnosis Acute embolic CVA, Metastatic non-small cell adenocarcinoma of the lung with brain mets   Does the patient have one of the following disease processes/diagnoses(primary or secondary)? Stroke   Does the patient have Home health ordered? No   Is there a DME ordered? No   Prep survey completed? Yes          Theodora Lee Registered Nurse

## 2023-05-01 NOTE — PROGRESS NOTES
[facility] Acute Inpt Rehab Note     Following per stroke order set.  Chart reviewed.    Noted patient worked with physical therapy 4/29 and physical therapy documenting patient safe to discharge home with assistance when medically stable.      Will sign off at this time.    Thank you,   Teri Lomas RN  Rehab Admission Nurse

## 2023-05-01 NOTE — PLAN OF CARE
Goal Outcome Evaluation:      No new event this shift.        Problem: Adult Inpatient Plan of Care  Goal: Plan of Care Review  Outcome: Ongoing, Progressing  Goal: Patient-Specific Goal (Individualized)  Outcome: Ongoing, Progressing  Goal: Absence of Hospital-Acquired Illness or Injury  Outcome: Ongoing, Progressing  Intervention: Identify and Manage Fall Risk  Recent Flowsheet Documentation  Taken 4/30/2023 2049 by Castillo Davis RN  Safety Promotion/Fall Prevention:   activity supervised   fall prevention program maintained   nonskid shoes/slippers when out of bed   safety round/check completed  Intervention: Prevent Skin Injury  Recent Flowsheet Documentation  Taken 4/30/2023 2049 by Castillo Davis RN  Body Position:   position changed independently   left  Taken 4/30/2023 1937 by Castillo Davis RN  Skin Protection:   adhesive use limited   incontinence pads utilized   tubing/devices free from skin contact  Intervention: Prevent and Manage VTE (Venous Thromboembolism) Risk  Recent Flowsheet Documentation  Taken 4/30/2023 1937 by Castillo Davis RN  Activity Management: activity encouraged  VTE Prevention/Management: (Lovenox) other (see comments)  Goal: Optimal Comfort and Wellbeing  Outcome: Ongoing, Progressing  Intervention: Monitor Pain and Promote Comfort  Recent Flowsheet Documentation  Taken 4/30/2023 2049 by Castillo Davis RN  Pain Management Interventions: see MAR  Intervention: Provide Person-Centered Care  Recent Flowsheet Documentation  Taken 4/30/2023 1937 by Castillo Davis RN  Trust Relationship/Rapport:   care explained   thoughts/feelings acknowledged  Goal: Readiness for Transition of Care  Outcome: Ongoing, Progressing     Problem: Fall Injury Risk  Goal: Absence of Fall and Fall-Related Injury  Outcome: Ongoing, Progressing  Intervention: Promote Injury-Free Environment  Recent Flowsheet Documentation  Taken 4/30/2023 2049 by Castillo Davis RN  Safety Promotion/Fall  Prevention:   activity supervised   fall prevention program maintained   nonskid shoes/slippers when out of bed   safety round/check completed     Problem: Skin Injury Risk Increased  Goal: Skin Health and Integrity  Outcome: Ongoing, Progressing  Intervention: Optimize Skin Protection  Recent Flowsheet Documentation  Taken 4/30/2023 1937 by Castillo Davis RN  Pressure Reduction Techniques:   frequent weight shift encouraged   weight shift assistance provided  Pressure Reduction Devices: positioning supports utilized  Skin Protection:   adhesive use limited   incontinence pads utilized   tubing/devices free from skin contact     Problem: Cerebral Tissue Perfusion (Stroke, Ischemic/Transient Ischemic Attack)  Goal: Optimal Cerebral Tissue Perfusion  Outcome: Ongoing, Progressing     Problem: Functional Ability Impaired (Stroke, Ischemic/Transient Ischemic Attack)  Goal: Optimal Functional Ability  Outcome: Ongoing, Progressing  Intervention: Optimize Functional Ability  Recent Flowsheet Documentation  Taken 4/30/2023 1937 by Castillo Davis RN  Activity Management: activity encouraged     Problem: Sensorimotor Impairment (Stroke, Ischemic/Transient Ischemic Attack)  Goal: Improved Sensorimotor Function  Outcome: Ongoing, Progressing  Intervention: Optimize Sensory and Perceptual Ability  Recent Flowsheet Documentation  Taken 4/30/2023 1937 by Castillo Davis RN  Pressure Reduction Techniques:   frequent weight shift encouraged   weight shift assistance provided  Pressure Reduction Devices: positioning supports utilized     Problem: Urinary Elimination Impaired (Stroke, Ischemic/Transient Ischemic Attack)  Goal: Effective Urinary Elimination  Outcome: Ongoing, Progressing     Problem: Infection Progression (Sepsis/Septic Shock)  Goal: Absence of Infection Signs and Symptoms  Outcome: Ongoing, Progressing  Intervention: Promote Recovery  Recent Flowsheet Documentation  Taken 4/30/2023 1937 by Ryan  Castillo, RN  Activity Management: activity encouraged

## 2023-05-01 NOTE — PLAN OF CARE
Goal Outcome Evaluation:  Plan of Care Reviewed With: patient           Outcome Evaluation: Pt seen by PT this morning. Pt completed transfers with sba to wheelchair. Family and pt  reports she does not ambulate due to neuropathy in B feet. OK to d/c home with family assist. Appears to be at baseline. PT will sign off.

## 2023-05-01 NOTE — PROGRESS NOTES
LOS: 3 days     Chief Complaint: Left elbow septic olecranon bursitis    Interval History: Leukocytosis improving.  Remains afebrile.  Tolerating antibiotics.    Vital Signs  Temp:  [97.4 °F (36.3 °C)-98.4 °F (36.9 °C)] 98.4 °F (36.9 °C)  Heart Rate:  [] 79  Resp:  [18-20] 18  BP: (112-142)/(83-98) 142/98    Physical Exam:  General: In no acute distress  HEENT: Oropharynx clear, moist mucous membranes  Respiratory: Normal work of breathing  GI: Soft, NT/ND  Skin: Erythema over the left elbow with no drainage.  Extremities: Edema over the left elbow.  Access: Peripheral IV.    Antibiotics:  Anti-Infectives (From admission, onward)    Ordered     Dose/Rate Route Frequency Start Stop    04/30/23 0740  ceFAZolin in dextrose (ANCEF) IVPB solution 2 g        Ordering Provider: Herbert Zhou, DO    2 g  over 30 Minutes Intravenous Every 8 Hours 04/30/23 0830 05/07/23 0829           Results Review:     I reviewed the patient's new clinical results.    Lab Results   Component Value Date    WBC 11.55 (H) 05/01/2023    HGB 8.3 (L) 05/01/2023    HCT 25.5 (L) 05/01/2023    MCV 84.7 05/01/2023     (L) 05/01/2023     Lab Results   Component Value Date    GLUCOSE 96 05/01/2023    BUN 29 (H) 05/01/2023    CREATININE 1.06 (H) 05/01/2023    EGFRIFNONA 46 (L) 02/14/2022    EGFRIFAFRI 95 11/24/2020    BCR 27.4 (H) 05/01/2023    CO2 19.0 (L) 05/01/2023    CALCIUM 8.3 (L) 05/01/2023    PROTENTOTREF 6.7 08/02/2022    ALBUMIN 3.6 04/29/2023    LABIL2 0.8 08/02/2022    AST 9 04/29/2023    ALT 12 04/29/2023       Microbiology:  4/24 left elbow fluid culture with MSSA  4/29 blood cultures no growth to date    Assessment    #Left olecranon septic bursitis with MSSA  #Left middle cerebral artery stroke  #CKD stage III  #Metastatic non-small cell lung cancer   #Chronic steroid use  #History of breast cancer  #Brain metastasis  #Recent DVT    Blood cultures negative to date.  TTE is reassuring without any signs of  endocarditis.  Can continue cefazolin 2 g every 8 hours while inpatient for MSSA septic bursitis.  Okay with discharge on Keflex 500 mg every 6 hours to complete out prior outlined 14-day course and have orthopedic follow-up.      Thank you for allowing me to be involved in the care of this patient. Infectious diseases will sign off at this time with antibiotics plan in place, but please call me at [phone] if any further ID questions or new ID concerns.

## 2023-05-01 NOTE — PLAN OF CARE
Problem: Adult Inpatient Plan of Care  Goal: Plan of Care Review  Outcome: Ongoing, Progressing  Goal: Patient-Specific Goal (Individualized)  Outcome: Ongoing, Progressing  Goal: Absence of Hospital-Acquired Illness or Injury  Outcome: Ongoing, Progressing  Goal: Optimal Comfort and Wellbeing  Outcome: Ongoing, Progressing  Goal: Readiness for Transition of Care  Outcome: Ongoing, Progressing     Problem: Fall Injury Risk  Goal: Absence of Fall and Fall-Related Injury  Outcome: Ongoing, Progressing     Problem: Skin Injury Risk Increased  Goal: Skin Health and Integrity  Outcome: Ongoing, Progressing     Problem: Cerebral Tissue Perfusion (Stroke, Ischemic/Transient Ischemic Attack)  Goal: Optimal Cerebral Tissue Perfusion  Outcome: Ongoing, Progressing   Goal Outcome Evaluation:

## 2023-05-01 NOTE — DISCHARGE SUMMARY
PHYSICIAN DISCHARGE SUMMARY                                                                        [facility]    Patient Identification:  Name: Holli Patel  Age: 57 y.o.  Sex: female  :  1965  MRN: [id number]  Primary Care Physician: Vandana Gastelum MD    Admit date: 2023  Discharge date and time: 2023     Discharged Condition: good    Discharge Diagnoses:   Acute embolic CVAs left MCA distribution: Xarelto switched to Lovenox.  High-dose Lipitor.  Metastatic non-small cell adenocarcinoma of the lung: Including brain metastasis.  Continue follow-up with oncology.  Septic olecranon bursitis-MSSA, left: Good response to first generation cephalosporins.  Discharged on Keflex 5 mg 4 times daily and follow-up with orthopedic surgery in 1 week.  Anemia:   Thrombocytopenia:   Leukocytosis:  Recent DVT left lower extremity:    Hospital Course:   Very pleasant 57-year-old female with widely metastatic lung cancer as well as recent DVT on Xarelto and recently started antibiotics for septic left olecranon bursitis with MSSA.  She presents with intermittent right upper extremity weakness.  Please see H&P for full details.  Work-up including CT, CTA and MRI revealed not only multiple brain metastases but also multiple embolic appearing strokes mostly in the left MCA distribution.  Echocardiogram showed no evidence of vegetations.  She was already on Xarelto for recently diagnosed DVT.  This obviously be considered a Xarelto failure.  She was switched over to Lovenox subcu.  She was evaluated by neurology as well as her oncologist and infectious disease-the latter for the olecranon bursitis.  Concerning the latter she was switched over to intravenous first-generation cephalosporins which she had a very good response.  On discharge will be switched to oral form.  Her 's been trained in use of the Lovenox.  She remains  afebrile vital signs stable and very eager to return home which would be very reasonable at this point.    Consults:     Consults     Date and Time Order Name Status Description    4/29/2023 11:58 AM Inpatient Infectious Diseases Consult Completed     4/29/2023 11:54 AM Hematology & Oncology Inpatient Consult      4/29/2023 11:54 AM Inpatient Neurology Consult Stroke Completed     4/28/2023  2:54 PM Inpatient Neurology Consult Stroke Completed             Discharge Exam:  Afebrile vital signs stable.  Well-developed well-nourished female no apparent distress.  Lungs clear to auscultation with good air movement.  Heart regular rate and rhythm.  Extremities no clubbing cyanosis or edema.  Alert oriented conversant cooperative and pleasant.     Disposition:  Home    Patient Instructions:      Discharge Medications      New Medications      Instructions Start Date   atorvastatin 80 MG tablet  Commonly known as: LIPITOR   80 mg, Oral, Nightly      cephalexin 500 MG capsule  Commonly known as: Keflex   500 mg, Oral, 4 Times Daily      Enoxaparin Sodium 60 MG/0.6ML solution prefilled syringe syringe  Commonly known as: LOVENOX   1 mg/kg (65 mg), Subcutaneous, Every 12 Hours         Continue These Medications      Instructions Start Date   ALPRAZolam 0.5 MG tablet  Commonly known as: XANAX   1 BY MOUTH EVERY 6 HOURS, CHANGE IN QUANTITY, METASTATIC CANCER   Start Date: May 19, 2023     dexamethasone 4 MG tablet  Commonly known as: DECADRON   Take 2 tablets oral twice a day for 3 consecutive days beginning the day before chemotherapy and continue for 6 doses.       dexamethasone 2 MG tablet  Commonly known as: DECADRON   2 mg, Oral, Daily, Take at noon      folic acid 1 MG tablet  Commonly known as: FOLVITE   TAKE 1 TABLET BY MOUTH EVERY DAY      letrozole 2.5 MG tablet  Commonly known as: FEMARA   2.5 mg, Oral, Daily      omeprazole 20 MG capsule  Commonly known as: PrilOSEC   20 mg, Oral, 2 Times Daily      ondansetron 8 MG  tablet  Commonly known as: Zofran   8 mg, Oral, Every 8 Hours PRN      oxybutynin 5 MG tablet  Commonly known as: DITROPAN   5 mg, Oral, 3 Times Daily      oxyCODONE 15 MG immediate release tablet  Commonly known as: ROXICODONE   15 mg, Oral, Every 4 Hours PRN      potassium chloride 10 MEQ CR capsule  Commonly known as: MICRO-K   TAKE 2 CAPSULES BY MOUTH EVERY DAY      rizatriptan 10 MG tablet  Commonly known as: MAXALT   10 mg, Oral, Once, AT ONSET OF HEADACHE MAY REPEAT ONE TABLET IN 2 HOURS IF NEEDED         Stop These Medications    rivaroxaban 20 MG tablet  Commonly known as: XARELTO     sulfamethoxazole-trimethoprim 800-160 MG per tablet  Commonly known as: Bactrim DS        ASK your doctor about these medications      Instructions Start Date   naloxone 4 MG/0.1ML nasal spray  Commonly known as: Narcan   1 spray, Nasal, As Needed           Diet Instructions     Diet: Regular/House Diet; Regular Texture (IDDSI 7); Thin (IDDSI 0)      Discharge Diet: Regular/House Diet    Texture: Regular Texture (IDDSI 7)    Fluid Consistency: Thin (IDDSI 0)        Future Appointments   Date Time Provider Department Center   5/15/2023  8:30 AM Hadley Vora APRN [facility] L100 [facility]   5/16/2023  9:00 AM INFU [facility] PORT CHAIR  INFUS [facility]RE LAG   5/16/2023  9:20 AM Salma Snell MD K [facility] [place]   5/16/2023 10:00 AM CHAIR 01 [facility] - [facility] IN[facility]US [facility]     Additional Instructions for the Follow-ups that You Need to Schedule     Discharge Follow-up with PCP   As directed       Currently Documented PCP:    Vandana Gastelum MD    PCP Phone Number:    [phone]     Follow Up Details: 1 week         Discharge Follow-up with Specialty: Oncology, Orthopedic Surgery, Neurology   As directed      Specialty: Oncology, Orthopedic Surgery, Neurology    Follow Up Details: As directed.            Follow-up Information     Salma Snell MD. Schedule an appointment as soon as possible for a visit in 1 week(s).     Specialties: Hematology and Oncology, Internal Medicine, Oncology  Contact information:  [address]  [address]  [facility] [id number] [phone]             Vandana Gastelum MD .    Specialty: Family Medicine  Why: 1 week  Contact information:  [address]  [facility] [id number] [phone]             Barron Dowd MD. Schedule an appointment as soon as possible for a visit in 1 month(s).    Specialty: Neurology  Contact information:  [address]  [address]  [facility] [id number] [phone]                       Discharge Order (From admission, onward)     Start     Ordered    05/01/23 1443  Discharge patient  Once        Expected Discharge Date: 05/01/23    Discharge Disposition: Home or Self Care    Physician of Record for Attribution - Please select from Treatment Team: JOSE ARMANDO BARKSDALE [[id number]]    Review needed by CMO to determine Physician of Record: No       Question Answer Comment   Physician of Record for Attribution - Please select from Treatment Team JOSE ARMANDO BARKSDALE    Review needed by CMO to determine Physician of Record No        05/01/23 1448                  Total time spent discharging patient including evaluation,post hospitalization follow up,  medication and post hospitalization instructions and education total time exceeds 30 minutes.    Signed:  Jose Armando Barksdale MD  5/1/2023  15:52 EDT    EMR Dragon/Transcription disclaimer:   Much of this encounter note is an electronic transcription/translation of spoken language to printed text. The electronic translation of spoken language may permit erroneous, or at times, nonsensical words or phrases to be inadvertently transcribed; Although I have reviewed the note for such errors, some may still exist.

## 2023-05-01 NOTE — PROGRESS NOTES
REASON FOR FOLLOW-UP: Stage IV non-small cell lung cancer      HISTORY OF PRESENT ILLNESS:  The patient is a 57 y.o. year old female with medical history significant for metastatic non-small cell lung cancer, adenocarcinoma, history of breast cancer, history of DVT on anticoagulation, hypothyroidism and hypertension.  called oncology office with acute right-sided weakness.    An MRI brain was performed on 4/20/2023, which noted multiple areas of small acute infarct in the left middle cerebral artery territory.  Patient was advised to come to the ER and get admitted for further evaluation and management.  Work-up has revealed a left middle cerebral artery stroke.  Neurology evaluated felt related to hypercoagulable status of malignancy.  Plan is to switch the patient from Xarelto to therapeutic Lovenox and discontinue aspirin.  Neurology has signed off.    Today the patient states she feels the same as before admission.  She has left upper extremity weakness.  She complains of sore mouth from chemotherapy.    Past Medical History, Past Surgical History, Social History, Family History have been reviewed and are without significant changes except as mentioned.    Review of Systems   HENT: Positive for mouth sores.    Respiratory: Negative.    Cardiovascular: Negative.    Gastrointestinal: Negative.    Endocrine: Negative.    Musculoskeletal: Negative.    Skin: Negative.    Allergic/Immunologic: Negative.    Neurological: Positive for weakness.   Psychiatric/Behavioral: Negative.       .    Medications:  The current medication list was reviewed in the EMR    ALLERGIES:  No Known Allergies    Objective      Vitals:    05/01/23 0839   BP: 142/98   Pulse: 79   Resp: 18   Temp: 98.4 °F (36.9 °C)   SpO2: 100%          Physical Exam    CONSTITUTIONAL: pleasant well-developed adult woman  HEENT: Mucositis affecting buccal/gum mucosa  LYMPH: no cervical or supraclavicular lad  CV: RRR, S1S2, no murmur  RESP: Breath sounds  diminished without wheezing  GI: soft, non-tender, no splenomegaly, +bs  MUSC: no edema   NEURO: alert and oriented x3, left upper extremity weakness  PSYCH: normal mood and affect    RECENT LABS:  Hematology Results from last 7 days   Lab Units 05/01/23  0540 04/30/23  0534 04/29/23  0531   WBC 10*3/mm3 11.55* 14.28* 21.93*   HEMOGLOBIN g/dL 8.3* 6.9* 7.8*   HEMATOCRIT % 25.5* 21.3* 24.2*   PLATELETS 10*3/mm3 136* 128* 127*     2  Lab Results   Component Value Date    GLUCOSE 96 05/01/2023    BUN 29 (H) 05/01/2023    CREATININE 1.06 (H) 05/01/2023    EGFRIFNONA 46 (L) 02/14/2022    EGFRIFAFRI 95 11/24/2020    BCR 27.4 (H) 05/01/2023    CO2 19.0 (L) 05/01/2023    CALCIUM 8.3 (L) 05/01/2023    PROTENTOTREF 6.7 08/02/2022    ALBUMIN 3.6 04/29/2023    LABIL2 0.8 08/02/2022    AST 9 04/29/2023    ALT 12 04/29/2023       Lab Results   Component Value Date    IRON 43 04/30/2023    TIBC 295 (L) 04/30/2023    FERRITIN 284.00 (H) 04/30/2023       Lab Results   Component Value Date    XVVLIUTC03 485 05/01/2023       Lab Results   Component Value Date    FOLATE 17.00 05/01/2023          Assessment & Plan   Ms. Bettencourt is a pleasant 57-year-old female with medical history significant for metastatic non-small cell lung cancer, adenocarcinoma, history of breast cancer, history of DVT on anticoagulation, hypothyroidism and hypertension admitted with left cerebellar embolic stroke.       #Left middle cerebral artery stroke:  • Likely hypercoagulable from malignancy.  Of note, patient was on Xarelto (taking inadvertently 20 mg twice daily).  This is consistent with Xarelto failure  • Anticoagulation switched to Lovenox 1 mg/kg twice daily dose.  • Started on Lipitor 80 mg daily      #Metastatic non-small cell lung cancer, adenocarcinoma:  • Follows up with Dr. Snell in our practice.  • Currently on treatment with docetaxel.  Last dose (cycle 8) on 4/25/2023.  • The MRI from this admission note mild progression (by 1-2 mm) in  some brain mets, while resolution of some other mets.  Appears to have mixed response.     #Normocytic anemia:  • Hemoglobin 6.9 on 4/30.  Likely recent chemotherapy related.  Transfused 1 unit PRBCs  • Ferritin 284, iron sat 15%/TIBC 295, B12 485, folate 17  • Hemoglobin 8.3 today after transfusion     #Thrombocytopenia secondary to chemotherapy:  • Platelet count stable 136     #Leukocytosis: Likely reactive vs left elbow infection and she also received Udenyca on 4/26/2023     #Left elbow olecranon bursitis:   -S/p drainage.  Cultures growing MSSA  -On IV cefazolin per ID with plans of oral antibiotics on discharge     #Acute LLE DVT:  -Diagnosed 3/21/2023  -On Lovenox as above    #Mucositis secondary to chemotherapy on mouthwash     Plan:  -Agree with switch anticoagulation from Xarelto to Lovenox 1 mg/kg twice daily  -Antibiotic plan per ID  -Continue mouthwash for mucositis  -NP visit next week in the office to recheck labs  -No opposition to discharge from an oncology perspective                       5/1/2023      CC:

## 2023-05-01 NOTE — THERAPY TREATMENT NOTE
Patient Name: Holli Patel  : 1965    MRN: [id number]                              Today's Date: 2023       Admit Date: 2023    Visit Dx: No diagnosis found.  Patient Active Problem List   Diagnosis   • History of gastroesophageal reflux (GERD)   • Primary insomnia   • Mixed hyperlipidemia   • Other specified hypothyroidism   • Anxiety   • History of migraine   • Essential hypertension   • History of colon polyps   • Diverticulosis   • Personal history of tobacco use   • Osteopenia   • Dyspnea   • Malignant neoplasm of upper-outer quadrant of right breast in female, estrogen receptor positive   • Malignant neoplasm of upper-inner quadrant of left breast in female, estrogen receptor positive   • Primary adenocarcinoma of upper lobe of left lung (HCC)   • Encounter for fitting and adjustment of vascular catheter   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   • 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   • History of bilateral breast cancer   • Stage 3a chronic kidney disease   • Chemotherapy-induced neutropenia   • Metabolic acidosis   • Ataxia   • Bony metastasis   • Hypopotassemia   • Hypophosphatemia   • Hypomagnesemia   • Cancer related pain   • Polyneuropathy   • Altered sensorium   • Lumbar radiculopathy   • Lumbar foraminal stenosis   • Deep venous thrombosis   • Benign hypertension with chronic kidney disease   • OAB (overactive bladder)   • Metastasis to brain   • Abnormal MRI of head   • MSSA infection, non-invasive   • Olecranon bursa abscess, left   • Abnormal finding on MRI of brain     Past Medical History:   Diagnosis Date   • Anxiety    • Clot     POSSIBLE BLOOD CLOT IN VENOUS ACCESS DEVICE-PT STATES WAS STARTED ON ELIQUIS    • Dyspnea on exertion    • Elevated cholesterol    • Frequent urination     DAY AND NIGHT   • SHAYY (generalized anxiety disorder)     RELATED HIGH BLOOD PRESSURE   • GERD (gastroesophageal reflux disease)    • High blood pressure     ANXIETY  RELATED   • Hyperlipidemia    • Hypothyroidism    • Lung cancer    • Lung nodule     LEFT   • Malignant neoplasm of upper-outer quadrant of right breast in female, estrogen receptor positive 4/13/2021    PT STATES HAS BILAT BREAST CANCER   • Migraine    • PONV (postoperative nausea and vomiting)      Past Surgical History:   Procedure Laterality Date   • BREAST BIOPSY Bilateral 04/07/2021    Right Breast 10 o'clock & Left breast 10 o'clock 6 cm from nipple, [facility]   • CLOSED REDUCTION HIP DISLOCATION Left 4/30/2021    Procedure: BRONCHOSCOPY, LEFT VIDEO ASSITED THORACOSCOPY, ROBOTIC ASSSITED MEDIASTINAL LYMPHADENECTOMY WITH INTERCOSTAL NERVE BLOCKS;  Surgeon: Raphael Kerr III, MD;  Location: [facility] MAIN OR;  Service: DaVinci;  Laterality: Left;   • COLONOSCOPY     • EPIDURAL Bilateral 12/28/2022    Procedure: LUMBAR/SACRAL TRANSFORAMINAL EPIDURAL Bilateral L5-S1;  Surgeon: Breann Santiago MD;  [place] [facility] [place];  Service: Pain Management;  Laterality: Bilateral;   • EPIDURAL Right 2/13/2023    Procedure: LUMBAR/SACRAL TRANSFORAMINAL EPIDURAL Right L5-S1 80748 (Hold eliqus 3 days);  Surgeon: Breann Santiago MD;  Location: [facility];  Service: Pain Management;  Laterality: Right;   • TUBAL ABDOMINAL LIGATION     • VENOUS ACCESS DEVICE (PORT) INSERTION Right 5/20/2021    Procedure: INSERTION VENOUS ACCESS DEVICE;  Surgeon: Raphael Kerr III, MD;  Location: [facility] MAIN OR;  Service: Thoracic;  Laterality: Right;   • VENOUS ACCESS DEVICE (PORT) INSERTION Right 11/29/2021    Procedure: REVISION AND REPLACEMENT RIGHT SUBCLAVIAN VENOUS ACCESS PORT;  Surgeon: Raphael Kerr III, MD;  Location: [facility] MAIN OR;  Service: Thoracic;  Laterality: Right;   • WRIST SURGERY Right       General Information     Row Name 05/01/23 1101          Physical Therapy Time and Intention    Document Type therapy note (daily note)  -CW     Mode of Treatment physical therapy;individual therapy  -CW     Row Name 05/01/23 1101           General Information    Patient Profile Reviewed yes  -CW     Existing Precautions/Restrictions fall  -CW     Row Name 05/01/23 1101          Cognition    Orientation Status (Cognition) oriented x 3  -CW     Row Name 05/01/23 1101          Safety Issues, Functional Mobility    Impairments Affecting Function (Mobility) endurance/activity tolerance;strength;balance;pain;cognition  -CW           User Key  (r) = Recorded By, (t) = Taken By, (c) = Cosigned By    Initials Name Provider Type    Leatha Deras, [place] Physical Therapist               Mobility     Row Name 05/01/23 1102          Bed Mobility    Comment, (Bed Mobility) sitting EOB  -CW     Row Name 05/01/23 1102          Transfers    Comment, (Transfers) family and pt adament that pt cannot walk, transfers only today  -CW     Row Name 05/01/23 1102          Bed-Chair Transfer    Bed-Chair [place] (Transfers) standby assist  -CW     Assistive Device (Bed-Chair Transfers) --  -CW     Comment, (Bed-Chair Transfer) transfer bed<>wheelchair  -CW           User Key  (r) = Recorded By, (t) = Taken By, (c) = Cosigned By    Initials Name Provider Type    Leatha Deras PT Physical Therapist               Obj/Interventions    No documentation.                Goals/Plan    No documentation.                Clinical Impression     Row Name 05/01/23 1103          Pain    Pretreatment Pain Rating 0/10 - no pain  -CW     Row Name 05/01/23 1103          Plan of Care Review    Plan of Care Reviewed With patient  -CW     Outcome Evaluation Pt seen by PT this morning. Pt completed transfers with sba to wheelchair. Family and pt  reports she does not ambulate due to neuropathy in B feet. OK to d/c home with family assist. Appears to be at baseline. PT will sign off.  -CW     Row Name 05/01/23 1103          Positioning and Restraints    Pre-Treatment Position in bed  -CW     Post Treatment Position bed  -CW     In Bed sitting EOB;notified nsg;call light  within reach;encouraged to call for assist;with family/caregiver  -CW           User Key  (r) = Recorded By, (t) = Taken By, (c) = Cosigned By    Initials Name Provider Type    Leatha Deras, [place] Physical Therapist               Outcome Measures     Row Name 05/01/23 1102 05/01/23 0700       How much help from another person do you currently need...    Turning from your back to your side while in flat bed without using bedrails? 4  -CW 4  -HW    Moving from lying on back to sitting on the side of a flat bed without bedrails? 4  -CW 4  -HW    Moving to and from a bed to a chair (including a wheelchair)? 4  -CW 4  -HW    Standing up from a chair using your arms (e.g., wheelchair, bedside chair)? 4  -CW 4  -HW    Climbing 3-5 steps with a railing? 1  -CW 2  -HW    To walk in hospital room? 2  -CW 2  -HW    AM-PAC 6 Clicks Score (PT) 19  -CW 20  -HW    Highest level of mobility 6 --> Walked 10 steps or more  -CW 6 --> Walked 10 steps or more  -HW    Row Name 05/01/23 1102          Functional Assessment    Outcome Measure Options AM-PAC 6 Clicks Basic Mobility (PT)  -CW           User Key  (r) = Recorded By, (t) = Taken By, (c) = Cosigned By    Initials Name Provider Type    Leatha Deras, [place] Physical Therapist    HW Marga Leung, RN Registered Nurse                             Physical Therapy Education     Title: PT OT SLP Therapies (Done)     Topic: Physical Therapy (Done)     Point: Mobility training (Done)     Learning Progress Summary           Patient Acceptance, E, VU by CW at 5/1/2023 1103    Acceptance, E, VU by DB at 4/29/2023 1539                   Point: Home exercise program (Done)     Learning Progress Summary           Patient Acceptance, E, VU by CW at 5/1/2023 1103    Acceptance, E, VU by DB at 4/29/2023 1539                   Point: Body mechanics (Done)     Learning Progress Summary           Patient Acceptance, E, VU by CW at 5/1/2023 1103    Acceptance, E, VU by DB at 4/29/2023 1539                    Point: Precautions (Done)     Learning Progress Summary           Patient Acceptance, E, VU by CW at 5/1/2023 1103    Acceptance, E, VU by DB at 4/29/2023 1539                               User Key     Initials Effective Dates Name Provider Type Discipline    DB 06/16/21 -  Eloisa Castillo, PT Physical Therapist PT    CW 12/13/22 -  Leatha Rodriguez PT Physical Therapist PT              PT Recommendation and Plan     Plan of Care Reviewed With: patient  Outcome Evaluation: Pt seen by PT this morning. Pt completed transfers with sba to wheelchair. Family and pt  reports she does not ambulate due to neuropathy in B feet. OK to d/c home with family assist. Appears to be at baseline. PT will sign off.     Time Calculation:    PT Charges     Row Name 05/01/23 1101             Time Calculation    Start Time 1100  -CW      Stop Time 1108  -CW      Time Calculation (min) 8 min  -CW      PT Received On 05/01/23  -CW      PT - Next Appointment 05/02/23  -CW            User Key  (r) = Recorded By, (t) = Taken By, (c) = Cosigned By    Initials Name Provider Type    CW Leatha Rodriguez, PT Physical Therapist              Therapy Charges for Today     Code Description Service Date Service Provider Modifiers Qty    [id number]  PT THERAPEUTIC ACT EA 15 MIN 5/1/2023 Leatha Rodriguez PT GP 1          PT G-Codes  Outcome Measure Options: AM-PAC 6 Clicks Basic Mobility (PT)  AM-PAC 6 Clicks Score (PT): 19  AM-PAC 6 Clicks Score (OT): 15  Modified [place] Scale: 4 - Moderately severe disability.  Unable to walk without assistance, and unable to attend to own bodily needs without assistance.  PT Discharge Summary  Anticipated Discharge Disposition (PT): home    Leatha Rodriguez PT  5/1/2023

## 2023-05-02 ENCOUNTER — TRANSITIONAL CARE MANAGEMENT TELEPHONE ENCOUNTER (OUTPATIENT)
Dept: CALL CENTER | Facility: HOSPITAL | Age: 58
End: 2023-05-02
Payer: COMMERCIAL

## 2023-05-02 NOTE — OUTREACH NOTE
Call Center TCM Note    Flowsheet Row Responses   [facility] patient discharged from? [place]   Does the patient have one of the following disease processes/diagnoses(primary or secondary)? Stroke   TCM attempt successful? Yes   Call start time 1050   Call end time 1100   Discharge diagnosis Acute embolic CVAs left MCA distribution, Metastatic non-small cell adenocarcinoma of the lung,Septic olecranon bursitis-MSSA left, anemia, thrombocytopenia, leukocytosis, recent DVT LLE    Person spoke with today (if not patient) and relationship Patient   Meds reviewed with patient/caregiver? Yes   Does the patient have all medications ordered at discharge? Yes   Is the patient taking all medications as directed (includes completed medication regime)? Yes   Medication comments Patient reports that her  handles all the medicines. Denies any issues with doing the lovenox shots, except that she is concerned about the bruising from the shots.    Comments PCP Dr Vandana Gastelum. Patient declined to schedule PCP appt with call today. She states that she will have her  call the office to arrange.    Does the patient have an appointment with their PCP within 7 days of discharge? No   Nursing Interventions Routed TCM call to PCP office, PCP office requested to make appointment - message sent   Has home health visited the patient within 72 hours of discharge? N/A   Psychosocial issues? No   Does the patient have any residual symptoms from stroke/TIA? No   Did the patient receive a copy of their discharge instructions? Yes   Nursing interventions Reviewed instructions with patient   What is the patient's perception of their health status since discharge? Improving   Is the patient/caregiver able to teach back signs and symptoms related to disease process for when to call PCP? Yes   Is the patient/caregiver able to teach back signs and symptoms related to disease process for when to call 911? Yes   If the patient is a  current smoker, are they able to teach back resources for cessation? Not a smoker   Is the patient/caregiver able to teach back the hierarchy of who to call/visit for symptoms/problems? PCP, Specialist, Home health nurse, Urgent Care, ED, 911 Yes   TCM call completed? Yes   Call end time 1100   Would this patient benefit from a Referral to [facility] Social Work? No   Is the patient interested in additional calls from an ambulatory ?  NOTE:  applies to high risk patients requiring additional follow-up. No          Gwen Bullock RN    5/2/2023, 11:01 EDT

## 2023-05-03 NOTE — PROGRESS NOTES
Case Management Discharge Note      Final Note: Home         Selected Continued Care - Discharged on 5/1/2023 Admission date: 4/28/2023 - Discharge disposition: Home or Self Care    Destination    No services have been selected for the patient.              Durable Medical Equipment    No services have been selected for the patient.              Dialysis/Infusion    No services have been selected for the patient.              Home Medical Care    No services have been selected for the patient.              Therapy    No services have been selected for the patient.              Community Resources    No services have been selected for the patient.              Community & DME    No services have been selected for the patient.                  Transportation Services  Private: Car    Final Discharge Disposition Code: 01 - home or self-care

## 2023-05-04 LAB
BACTERIA SPEC AEROBE CULT: NORMAL
BACTERIA SPEC AEROBE CULT: NORMAL

## 2023-05-08 ENCOUNTER — INFUSION (OUTPATIENT)
Dept: ONCOLOGY | Facility: HOSPITAL | Age: 58
End: 2023-05-08
Payer: COMMERCIAL

## 2023-05-08 ENCOUNTER — ANTICOAGULATION VISIT (OUTPATIENT)
Dept: PHARMACY | Facility: HOSPITAL | Age: 58
End: 2023-05-08
Payer: COMMERCIAL

## 2023-05-08 ENCOUNTER — OFFICE VISIT (OUTPATIENT)
Dept: ONCOLOGY | Facility: CLINIC | Age: 58
End: 2023-05-08
Payer: COMMERCIAL

## 2023-05-08 VITALS
HEART RATE: 101 BPM | BODY MASS INDEX: 20.99 KG/M2 | RESPIRATION RATE: 18 BRPM | OXYGEN SATURATION: 99 % | SYSTOLIC BLOOD PRESSURE: 141 MMHG | TEMPERATURE: 98.3 F | HEIGHT: 69 IN | DIASTOLIC BLOOD PRESSURE: 98 MMHG

## 2023-05-08 DIAGNOSIS — Z17.0 MALIGNANT NEOPLASM OF UPPER-OUTER QUADRANT OF RIGHT BREAST IN FEMALE, ESTROGEN RECEPTOR POSITIVE: ICD-10-CM

## 2023-05-08 DIAGNOSIS — C79.51 NON-SMALL CELL LUNG CANCER METASTATIC TO BONE: Primary | ICD-10-CM

## 2023-05-08 DIAGNOSIS — C34.90 NON-SMALL CELL LUNG CANCER METASTATIC TO BONE: Primary | ICD-10-CM

## 2023-05-08 DIAGNOSIS — R29.898 RIGHT ARM WEAKNESS: ICD-10-CM

## 2023-05-08 DIAGNOSIS — C79.51 NON-SMALL CELL LUNG CANCER METASTATIC TO BONE: ICD-10-CM

## 2023-05-08 DIAGNOSIS — C50.411 MALIGNANT NEOPLASM OF UPPER-OUTER QUADRANT OF RIGHT BREAST IN FEMALE, ESTROGEN RECEPTOR POSITIVE: ICD-10-CM

## 2023-05-08 DIAGNOSIS — Z45.2 ENCOUNTER FOR FITTING AND ADJUSTMENT OF VASCULAR CATHETER: Primary | ICD-10-CM

## 2023-05-08 DIAGNOSIS — C34.90 NON-SMALL CELL LUNG CANCER METASTATIC TO BONE: ICD-10-CM

## 2023-05-08 DIAGNOSIS — I82.402 ACUTE DEEP VEIN THROMBOSIS (DVT) OF LEFT LOWER EXTREMITY, UNSPECIFIED VEIN: Primary | ICD-10-CM

## 2023-05-08 LAB
ALBUMIN SERPL-MCNC: 4.1 G/DL (ref 3.5–5.2)
ALBUMIN/GLOB SERPL: 1.9 G/DL (ref 1.1–2.4)
ALP SERPL-CCNC: 134 U/L (ref 38–116)
ALT SERPL W P-5'-P-CCNC: 23 U/L (ref 0–33)
ANION GAP SERPL CALCULATED.3IONS-SCNC: 15.6 MMOL/L (ref 5–15)
AST SERPL-CCNC: 25 U/L (ref 0–32)
BASOPHILS # BLD AUTO: 0.06 10*3/MM3 (ref 0–0.2)
BASOPHILS NFR BLD AUTO: 0.3 % (ref 0–1.5)
BILIRUB SERPL-MCNC: 0.3 MG/DL (ref 0.2–1.2)
BUN SERPL-MCNC: 22 MG/DL (ref 6–20)
BUN/CREAT SERPL: 18.5 (ref 7.3–30)
CALCIUM SPEC-SCNC: 9.5 MG/DL (ref 8.5–10.2)
CHLORIDE SERPL-SCNC: 105 MMOL/L (ref 98–107)
CO2 SERPL-SCNC: 21.4 MMOL/L (ref 22–29)
CREAT SERPL-MCNC: 1.19 MG/DL (ref 0.6–1.1)
DEPRECATED RDW RBC AUTO: 65.2 FL (ref 37–54)
EGFRCR SERPLBLD CKD-EPI 2021: 52.8 ML/MIN/1.73
EOSINOPHIL # BLD AUTO: 0.01 10*3/MM3 (ref 0–0.4)
EOSINOPHIL NFR BLD AUTO: 0.1 % (ref 0.3–6.2)
ERYTHROCYTE [DISTWIDTH] IN BLOOD BY AUTOMATED COUNT: 19.3 % (ref 12.3–15.4)
GLOBULIN UR ELPH-MCNC: 2.2 GM/DL (ref 1.8–3.5)
GLUCOSE SERPL-MCNC: 138 MG/DL (ref 74–124)
HCT VFR BLD AUTO: 32 % (ref 34–46.6)
HGB BLD-MCNC: 9.6 G/DL (ref 12–15.9)
IMM GRANULOCYTES # BLD AUTO: 0.73 10*3/MM3 (ref 0–0.05)
IMM GRANULOCYTES NFR BLD AUTO: 3.8 % (ref 0–0.5)
LYMPHOCYTES # BLD AUTO: 0.4 10*3/MM3 (ref 0.7–3.1)
LYMPHOCYTES NFR BLD AUTO: 2.1 % (ref 19.6–45.3)
MCH RBC QN AUTO: 28.7 PG (ref 26.6–33)
MCHC RBC AUTO-ENTMCNC: 30 G/DL (ref 31.5–35.7)
MCV RBC AUTO: 95.8 FL (ref 79–97)
MONOCYTES # BLD AUTO: 0.75 10*3/MM3 (ref 0.1–0.9)
MONOCYTES NFR BLD AUTO: 3.9 % (ref 5–12)
NEUTROPHILS NFR BLD AUTO: 17.5 10*3/MM3 (ref 1.7–7)
NEUTROPHILS NFR BLD AUTO: 89.8 % (ref 42.7–76)
NRBC BLD AUTO-RTO: 0.3 /100 WBC (ref 0–0.2)
PLATELET # BLD AUTO: 175 10*3/MM3 (ref 140–450)
PMV BLD AUTO: 9.5 FL (ref 6–12)
POTASSIUM SERPL-SCNC: 3.8 MMOL/L (ref 3.5–4.7)
PROT SERPL-MCNC: 6.3 G/DL (ref 6.3–8)
RBC # BLD AUTO: 3.34 10*6/MM3 (ref 3.77–5.28)
SODIUM SERPL-SCNC: 142 MMOL/L (ref 134–145)
WBC NRBC COR # BLD: 19.45 10*3/MM3 (ref 3.4–10.8)

## 2023-05-08 PROCEDURE — 36591 DRAW BLOOD OFF VENOUS DEVICE: CPT

## 2023-05-08 PROCEDURE — 25010000002 HEPARIN LOCK FLUSH PER 10 UNITS: Performed by: INTERNAL MEDICINE

## 2023-05-08 PROCEDURE — 80053 COMPREHEN METABOLIC PANEL: CPT

## 2023-05-08 PROCEDURE — 85025 COMPLETE CBC W/AUTO DIFF WBC: CPT

## 2023-05-08 RX ORDER — SODIUM CHLORIDE 0.9 % (FLUSH) 0.9 %
10 SYRINGE (ML) INJECTION AS NEEDED
OUTPATIENT
Start: 2023-05-08

## 2023-05-08 RX ORDER — HEPARIN SODIUM (PORCINE) LOCK FLUSH IV SOLN 100 UNIT/ML 100 UNIT/ML
500 SOLUTION INTRAVENOUS AS NEEDED
Status: DISCONTINUED | OUTPATIENT
Start: 2023-05-08 | End: 2023-05-08 | Stop reason: HOSPADM

## 2023-05-08 RX ORDER — SODIUM CHLORIDE 0.9 % (FLUSH) 0.9 %
10 SYRINGE (ML) INJECTION AS NEEDED
Status: DISCONTINUED | OUTPATIENT
Start: 2023-05-08 | End: 2023-05-08 | Stop reason: HOSPADM

## 2023-05-08 RX ORDER — HEPARIN SODIUM (PORCINE) LOCK FLUSH IV SOLN 100 UNIT/ML 100 UNIT/ML
500 SOLUTION INTRAVENOUS AS NEEDED
OUTPATIENT
Start: 2023-05-08

## 2023-05-08 RX ORDER — WARFARIN SODIUM 4 MG/1
TABLET ORAL
Qty: 30 TABLET | Refills: 2 | Status: SHIPPED | OUTPATIENT
Start: 2023-05-08

## 2023-05-08 RX ADMIN — Medication 500 UNITS: at 13:02

## 2023-05-08 RX ADMIN — Medication 10 ML: at 13:02

## 2023-05-08 NOTE — PROGRESS NOTES
Anticoagulation Clinic Progress Note    Patient's visit was held by phone today.    Anticoagulation Summary  As of 5/8/2023    INR goal:  2.0-3.0   TTR:  --   INR used for dosing:  No new INR was available at the time of this encounter.   Warfarin maintenance plan:  4 mg (4 mg x 1) every day; Starting 5/8/2023   Weekly warfarin total:  28 mg   Plan last modified:  Marta Ferguson RPH (5/8/2023)   Next INR check:  5/12/2023   Priority:  Critical   Target end date:      Indications    Acute deep vein thrombosis (DVT) of left lower extremity  unspecified vein [I82.402]             Anticoagulation Episode Summary     INR check location:      Preferred lab:      Send INR reminders to:  [facility] CBC ANTICOAG POOL    Comments:        Anticoagulation Care Providers     Provider Role Specialty Phone number    Salma Snell MD Referring Hematology and Oncology [phone]        INR History:      Latest Ref Rng & Units 4/26/2021     7:53 AM 5/18/2021     7:19 AM 11/26/2021    10:55 AM 5/8/2023     3:43 PM   Anticoagulation Monitoring   INR Goal     2.0-3.0   Last Week Total     0 mg   Next Week Total     28 mg   Sun     -   Mon     4 mg   Tue     4 mg   Wed     4 mg   Thu     4 mg   Fri     -   Sat     -   Historical INR 0.90 - 1.10 0.92   0.97   1.01      Visit Report     Report       Plan:  1.Start warfarin 4mg daily --tonight if available at [facility]  2. Follow up in 4 days with INR at clinic  3. Will continue with enoxaparin 65mg q12h starting tomorrow am at 0700 until INR is in therapeutic range with warfarin.Spouse expresses understanding and has no further questions at this time.  4. Reviewed syringe dose adjustment and proper injection technique for enoxaparin on phone today.    Marta Ferguson RPH

## 2023-05-08 NOTE — PROGRESS NOTES
[facility] OUTPATIENT PROGRESS NOTE        CHIEF COMPLAINT/REASON FOR VISIT:  Metastatic non-small cell lung cancer  Bilateral breast cancer    Interval history  Patient presents to the clinic today for follow-up.  She was recently admitted to the hospital after an outpatient MRI of the brain demonstrated new strokes.  Patient called the clinic for follow-up  On 4/27/2023 and complained of transient right upper extremity weakness.  MRI of the brain performed 4/28/2023 demonstrated multiple small acute infarcts in the left middle cerebral artery territory.  Some equivocal increase in size of the multiple metastatic lesions by maybe 1 to 2 mm.    Xarelto was discontinued and treatment was changed to Lovenox while she was in the hospital.  Patient is complaining of multiple bruises on her abdomen from the Lovenox injections.  She fell on her way to the bathroom and injured her right forearm resulting in significant amount of bleeding.  They had to hold the Lovenox injection last night because of the excessive bleeding.  She is also complaining of severe pain in her back that started a few days ago.  Mr. Patel is giving her OxyContin in addition to the oxycodone 15 mg to help with pain control.  She feels like the pain has been improving.    She is also experiencing worsening bilateral lower extremity edema.  She has been sitting for the most part and unable to ambulate much because of the back pain.  She is also not able to lay in bed due to the severe back pain.  She tried using compression socks but felt extremely uncomfortable and has not been using that.    Oncologic history  Ms. 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     3/1/2021-bilateral screening mammogram-microcalcifications seen in the posterior one third of the lateral aspect of the right breast.  Area of focal asymmetry seen in the middle one third of  the upper inner quadrant of the left breast.     3/1/2021-DEXA scan-T score of -2.2 on the lumbar spine and T score of -2.3 in the left hip and T score of -1.9 in the right hip.  Findings consistent with osteopenia     3/23/2021-screening lung CT-there is a 10 x 11 mm solid nodule in the left upper lobe.  Enlarged AP window lymph node measuring 14 x 10 mm.  Suggestion of a possible 9 mm left hilar lymph node.  Heavy coronary artery calcification.     3/23/2021-diagnostic mammogram bilateral-cluster of microcalcifications in the middle third lateral aspect of the right breast.  Left breast demonstrates persistence of the area of focal asymmetry in the region.     Ultrasound-left breast ultrasound at 10 o'clock position, 6 cm from the nipple there is a 0.4 cm irregular hypoechoic lesion.  Stereotactic biopsy of the right breast calcifications recommended.  Ultrasound-guided biopsy of the left breast lesion recommended     4/7/2021-right breast stereotactic biopsy and left breast ultrasound-guided biopsy  Pathology  Right breast-invasive ductal carcinoma, grade 2, lymphovascular invasion present, ER +99% strong, [place] +80% moderate, HER-2 negative, Ki-67 12%  Left breast upper inner quadrant 10 o'clock position biopsy, invasive ductal carcinoma, grade   2, ER +99% strong, [place] +40% strong, HER-2 2+ on immunohistochemistry, nonamplified on FISH Ki-67 10%     4/14/2021-PET/CT-FDG avid aortopulmonary window lymph node measures 0.9 cm with an SUV of 7.5.  FDG avid left hilar lymph node measures 1 cm with an SUV of 17.2.  Irregular soft tissue density in the right breast measuring 3.7 x 3.8 cm is favored to be secondary to the recent breast biopsy.  1.1 cm pulmonary nodule within the left upper lobe with SUV 9.7 .  Sub-6 mm pulmonary nodules are present in the right lower lobe.  No evidence of lymphadenopathy or metastatic disease in the abdomen.  Focal area of FDG uptake within the central canal posterior to T11-T12 displaced  demonstrating an SUV of 5.5 thought to be reactive however MRI is recommended for further evaluation.     She was seen by Dr. Molina who initially planned for breast surgery however  due to the PET abnormalities she has been referred to Dr. Kerr who plans to do a wound on 4/30/2021.  Dr. Molina's and Dr. Kerr's notes reviewed.     Patient denies any family history of breast cancer.  Her mother had lymphoma.  Maternal grandmother had colon cancer.  Prior to that screening mammogram she did not have any palpable abnormalities of either breast.     She has been a heavy smoker for about 40 years and smoked 2 packs/day.  Denies any recent weight changes, new bone pains, cough, abdominal pain nausea vomiting constipation or diarrhea.  She does have anxiety and has been on several medications.  She has recently been started on Xanax to help with the mood and also with insomnia.     Patient had a video-assisted thoracoscopy and mediastinal lymphadenectomy on 4/30/2021.  L5-L6 lymph nodes were biopsied however the small pulmonary nodule in the left upper lobe was not difficult to find.  Pathology showed moderately differentiated adenocarcinoma which is CK7 and TTF-1 positive.  Consistent with lung primary.  Ki-67 85%.     5/14/2021-MRI of the brain-minimal chronic small vessel ischemic change in the white matter.  Otherwise negative MRI.     5/20/2021-bilateral axillary ultrasound-no evidence of axillary lymphadenopathy in either axilla.  Normal-appearing bilateral axillary lymph nodes visualized.     Cycle 1 cisplatin and Alimta on 5/25/2021.     Cycle 2 cisplatin Alimta 6/15/2021     Cycle 3 cisplatin Alimta 7/7/2021     Completed radiation on 7/12/2021     Port was nonfunctional hence a port study was performed which showed that the port was backed up into the innominate vein and also there was a nonocclusive thrombus at the end of the catheter extending into the superior vena cava.  She was started on  anticoagulation with Eliquis.  It was recommended for port revision of the intention to keep the port.     PET/CT 8/5/2021-images independently reviewed and interpreted by me-decrease in size and FDG uptake of the left upper lobe pulmonary nodule as well as mediastinal and left hilar lymphadenopathy representing response to treatment.  Sub-6 mm pulmonary nodule in the left upper lobe new since 4/14/2021.  This could be related to radiation however follow-up CT in 3 months recommended.  Decrease in size of the irregular masslike tissue in the right breast which is postbiopsy hematoma.  This is not PET avid.  New segmental left eighth rib fractures healing.  A new band of sclerosis of the left seventh rib which favored to represent healing nondisplaced fracture.  Follow-up CT recommended.  focal uptake in the duodenum first and second portions.  Could be related to duodenitis.  Indeterminate lesion in the L1 vertebral body not well evaluated on PET/CT.     10/1/2021-MRI of the lumbar spine.  Lesion in the left posterior body of L1 measures 14 x 14 x 12 mm and previously 5 x 5 x 6 mm.  The interval enlargement strongly suggest that it is metastatic lesion.  No additional osseous metastasis noted.   Other chronic changes noted.     10/14/2021-biopsy of the lumbar spine lesion-pathology consistent with poorly differentiated carcinoma.  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     10/26/2021-brain MRI-no evidence of metastatic disease.     10/26/2021-bilateral diagnostic mammogram and ultrasound  Scattered fibroglandular density in both breast.  Postbiopsy hematoma with internal biopsy clip in the upper outer quadrant of the right breast, 8 cm from the nipple.  The hematoma size is decreased to 2.9 cm from 3.6 cm earlier.     Left breast-parenchymal density measuring 9 x 10 mm has markedly decreased.  Few adjacent calcifications which are  unchanged.  No new abnormality in the left breast.  At 10:00 region there is some minimal adjacent hypoechoic texture which is difficult to measure but appears smaller since the previous exam.     10/28/2021-PET/CT  New L1 and L2 lytic bone metastasis annually intensely hypermetabolic focus at the left adrenal gland likely represents metastatic disease as well.  Slight decrease in size of the 0.9 x 0.7 cm left upper lobe pulmonary nodule.  There is hypermetabolic activity related to radiation pneumonitis.  The remainder of the nodule is photopenic.  Uncertain etiology of the more intense activity along the right lateral peripheral margin of the 2.6 cm postbiopsy density of the right breast.     She completed radiation to to the lumbar spine on 11/19/2021 11/22/2021-cycle 1 Alimta and Keytruda     3/23/2022-bilateral diagnostic mammogram and ultrasound  Complete resolution of the microcalcifications in the upper outer quadrant of the right breast and decrease in size of the postbiopsy hematoma.  No suspicious abnormalities in the right breast.  Benign-appearing calcifications at the site of malignancy in the left breast upper inner quadrant.  No other suspicious findings.     4/11/2022 PET/CT-there is new groundglass opacities in the left upper lobe which are most likely postradiation changes.  Other groundglass opacities in the left lung as well as the right lung remained stable.  Increased nodularity of the left adrenal gland with an SUV of 5.6, previously 3.5.    Increased uptake in the L1 vertebra in the right posterior aspect with an SUV of 3.6.  Left L1 sclerosis increased     Due to this the case was discussed in multidisciplinary conference.  The disease progression was significant in the left adrenal gland as well as the L1 vertebra.  Since there were only 2 areas of disease progression it has been decided to proceed with radiation to these 2 spots and continue on Keytruda and Alimta.     Patient also  has had worsening of her kidney function.  We initially held treatment as of 5/31/2022 and creatinine bumped up to 2.0, BUN 23.  We then resumed therapy 6/11/2022 with Keytruda alone.  Patient was referred to nephrology.     6/6/2022-MRI of the spine-diffuse marrow replacement in the L1 vertebral body and inferior endplate concavity.  Suspicious for metastatic disease with pathological fracture in the inferior endplate.  Also diffuse signal abnormality in the L2 lamina on the left suspicious for additional metastatic disease.  Abnormality of the first sacral segment suspicious for metastatic disease.  Left adrenal gland appears enlarged.  30 to 40% height loss and L2 vertebral body suggestive of a subacute compression fracture.  Degenerative changes.     7/18/2022-PET/CT  Multiple hypermetabolic osseous lesions with index lesions which have either increased in degree of FDG uptake or newly hypermetabolic lesions representative of metastatic disease and progression of disease.  Possible FDG avid lesion in the left femoral diaphysis however these are incompletely visualized and in the area of artifactual FDG uptake.  MRI of the left femur recommended for further evaluation.  Increasing FDG uptake of the left adrenal gland.  Focal left hilar hypermetabolic him corresponds to the lymph node which has minimally increased in size compared to April 2022.  New sub-6 mm pulmonary nodules.     7/26/2022-cycle 1 of Taxotere     Patient was admitted to the hospital 8/3/2022 discharged on 8/9/2022.  She was admitted for strokelike symptoms and confusion.  The confusion was thought to be secondary to polypharmacy and probably febrile neutropenia.  She was treated with antibiotics.  Mental status improved subsequently.  MRI of the cervical thoracic and lumbar spine was performed on 8/4/2022.  Images independently reviewed by me.  Multiple metastatic lesions in the spine noted.  There was a lesion on the CT which was concerning.   There is also a sacral Lesion measuring up to 5 cm in transverse dimension on the right.  Patient was symptomatic with pain on the right side of the sacrum.  Radiation oncology recommended radiation to the cervical spine as well as sacrum.  CT head without any obvious abnormalities.     Completed radiation to the cervical spine and sacrum on 8/12/2022    She was admitted to the hospital between 9/23/2022 to 10/12/2022.  She was admitted for poorly controlled pain as well as encephalopathy and multiple falls.  She was diagnosed with a UTI and treated with IV Rocephin.  Pain medications changed and also additional radiation to the right sacrum was performed.  3000 cGy in 10 fractions was administered.  She was discharged on oxycodone 15 mg every 4 hours as needed for pain and OxyContin twice daily.  She was recommended to go to a subacute rehab however she preferred to be discharged home with home physical therapy and Occupational Therapy    9/24/2022-MRI of the pelvis showed osseous metastatic disease in the sacrum worst within the right and probable pathological fractures bilaterally.    10/4/2022 CT head without any evidence of metastatic disease or acute abnormalities.    10/25/2022-PET/CT-resolution of hypermetabolic activity in the upper lobe and left hilum.  Previously noted new tiny pulmonary nodules have resolved.  Resolution of hypermetabolic activity in the left adrenal gland.  Near complete resolution of hypermetabolic activity in the skeletal metastasis particularly the cervical spine, pelvic bones and proximal left femur.  Maximal SUV at the right sacral metastasis is 2.7 and previously it was 8.9.  Tiny focus of hypermetabolic activity at the GE junction with a maximal SUV of 4.7.  No definite thickening or abnormality noted on the CT.    11/15/2022-lumbar puncture with negative cytology of the CSF.    11/21/2022-MRI of the brain with contrast and MRI of the cervical spine with contrast without any  evidence of metastatic disease.    1/12/2023-PET/CT  Hypermetabolic mediastinal lymphadenopathy and multiple liver lesions which are hypermetabolic suggestive of metastasis.  New tiny left pleural effusion is noted.  Progression of bilateral sacral fractures and right L5 transverse process fracture.  There is also an acute/subacute L1 fracture.  Lumbar spine x-rays have been recommended for evaluation of the vertebral height or lumbar MRI to be compared to the priors.    1/23/2023-MRI of the brain  At least 9 separate tiny 2 to 6 mm enhancing lesions in the brain compatible with multiple new brain metastasis.  There has been interval development of 3 separate foci of encephalomalacia concerning for chronic infarcts but these are new since August 2022.  Stable osseous metastasis at C2.    3/27/2023-PET/CT  Significant interval improvement of the previously seen hypermetabolic mediastinal lymphadenopathy as well as hepatic lesions.  Focal area of uptake noted in the gallbladder fundus raising concern for cholecystitis.  0.6 cm pulmonary nodule in the left upper lobe is new and follow-up CT in 3 months recommended.  New bilateral groundglass opacities in lower lobes suggestive of atypical pneumonia.  Long segment mild to moderate uptake within the esophagus suggestive of esophagitis.    3/28/2023-MRI of the brain  There are at least 9 separate 2 to 6 mm enhancing lesions in the superficial cortex of the brain which have shown a slight interval decrease in size and also decreased enhancement noted.  No new enhancing lesions noted.  Concern for focal areas of leptomeningeal seeding.  Unchanged from previous MRI tiny old lacunar infarcts in the left MCA territory.     MRI of the brain 4/28/2023-multiple new strokes in the MCA territory.  1 to 2 mm increase in size of the previously known metastatic lesions    Admitted to the hospital and evaluated by neurology and treatment has been changed to Lovenox.  2D echocardiogram  without any evidence of blood clots.    There were no vitals filed for this visit.      PHYSICAL EXAMINATION:    General: Oriented to person, place, and time.  Ill-appearing.  Pale.  HEENT: EOMI.  No bruising/edema noted.  Chest/Lungs: Clear to auscultation bilaterally. Right chest mediport in place  Heart: RRR, sinus tachycardia  Extremities: Bilateral lower extremity without any swelling.  Decreased strength in both lower extremities.  Left lower extremity with scant redness to upper foot  No focal deficits.    I have reexamined the patient and the results are consistent with the previously documented exam. Salma Snell MD     DIAGNOSTIC DATA:  Results Review:        Results from last 7 days   Lab Units 05/08/23  1301   WBC 10*3/mm3 19.45*   HEMOGLOBIN g/dL 9.6*   HEMATOCRIT % 32.0*   PLATELETS 10*3/mm3 175     Lab Results   Component Value Date    NEUTROABS 17.50 (H) 05/08/2023                     IMAGING:    Duplex Venous Lower Extremity - Left CAR (03/21/2023 10:25)    PET/CT 1/12/2023 and MRI of the brain 1/23/2023-images independently reviewed and interpreted by me.    ASSESSMENT:  This is a 57 y.o. female with:     *Metastatic non-small cell lung cancer  · She was initially diagnosed with stage III (T1N2M0) disease.    · She received initial therapy with cisplatin and Alimta concurrent with radiation therapy beginning 5/25/2021.    · There were indeterminate findings in the lumbar spine at L1 and it was recommended to monitor this over time.    · Patient completed 3 cycles cisplatin and Alimta 7/7/2021 and completed radiation therapy 7/12/2021.    · PET scan 8/6/2021 showed good response to treatment.    · MRI lumbar spine 10/1/2021 with increase in size of the L1 lesion.    · Biopsy of the L1 lesion on 10/14/2022 confirmed metastatic adenocarcinoma of lung origin, PD-L1 TPS 0.    · PET scan 10/26/2021 with involvement of left adrenal and L1/L2 only.    · Patient received radiation therapy to L1/L2 on  11/19/2021.  She also received radiation to the left adrenal gland.    · She initiated further systemic therapy on 11/22/2021 with Keytruda and Alimta.   · Follow-up PET scan 1/18/2022 with decrease in small left upper lobe pulmonary nodule, resolution of activity at L1/L2, no new sites of disease.    · Guardant 360 CT DNA level was monitored and was decreasing.    · PET scan 4/11/2022 with progression in left adrenal and L1.    · Maintained systemic therapy with Keytruda and Alimta and received additional radiation to left adrenal and L1.  Alimta however held since 5/10/2022 due to renal dysfunction.  Radiation completed to lumbar spine 7/14/2022.    · PET scan 7/18/2022 with multiple areas of progression of the spine and right sacrum.    · Guardant 360 analysis 7/20/2022 with no actionable mutations.    · Change in therapy to single agent palliative Taxotere initiated 7/26/2022.    · Altered mental status and febrile neutropenia requiring hospitalization 8/2 - 8/9/2022.  During that admission, MRI brain, cervical, thoracic, lumbar spine performed 8/4/2022 in addition to CT cervical spine 8/5/2022.  There was evidence of C2 metastatic lesion with some dural enhancement, compression deformity at T7 with mild edema suggesting acute to subacute fracture, 5 mm T12 spinous process metastasis, multifocal disease in the lumbar spine and sacrum, largest involving sacral ala to the right 5 cm in transverse dimension.  Loss of height at L2 25%.  · Patient received cycle 3 Taxotere 9/6/2022 with Neulasta support.    · She has been continuing as well on Xgeva every 4 weeks (last received 9/6/2022).  · Patient did undergo MRI lumbar spine and pelvis on 9/24/2022.  MRI pelvis showed bilateral sacral fractures with marrow infiltration related to metastasis more prominent on the right.  Lesion on the right 3.5 x 4.1 x 4.3 cm.  Also probable metastasis along posterior acetabulum on the right 1.5 cm.  MRI lumbar spine with stable L1  metastasis, new edema endplate T12 possibly stress reaction versus developing new metastasis, lesion at T12 spinous process unchanged.   · Radiation oncology consulted with plans for palliative treatment of the right sacral metastasis.  Treatment initiated 9/28/2022  · Completed radiation to the right sacral metastasis on 10/11/2022.  · PET/CT 10/25/2022 without any metabolic uptake in the lung or the skeletal metastasis.  This is indicative of a positive response to Taxotere.  · Pain persistent sitting and walking position and not present when she is laying down.  · Patient is still very active and performance status is very poor.  Due to this reason chemotherapy will be continued to be held.  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  · Due to this reason an MRI of the brain was obtained but unfortunately this was performed without contrast.  Reviewed the MRI and no evidence of metastatic disease.  · 11/15/2022 lumbar puncture shows no evidence of malignancy in the CSF, elevated protein at 65.2, glucose normal at 58, culture no growth in 3 days  · Repeat MRI of the brain with contrast 11/21/2022 does not show any obvious abnormalities.  There is no enhancement of the CSF.  MRI of the cervical spine with no abnormalities.  · Patient unable to undergo MRI of the thoracic and lumbar spine due to mental status and unable to being in 1 position as well as with incontinence.  · 1/4/2023-mental status has improved significantly.  Patient reports decreased oral intake and nausea and vomiting.  She is also reporting abdominal discomfort.  · 1/12/2023-PET/CT with mediastinal lymphadenopathy as well as multiple liver lesions suggestive of disease progression.  · 1/23/2023-MRI of the brain with at least 9 different foci of small mets measuring up to 6 mm.  There is also areas of encephalomalacia concerning for chronic infarcts.    · Resumed Taxotere  1/31/2023.  Dose reduced to 60 mg per metered square.  · 3/27/2023-PET/CT shows significant response in the chest as well as the hepatic lesions.  · 3/28/2023-MRI of the brain-improvement in the metastatic disease to the brain with decrease in the size as well as decrease in enhancement noted.  · 4/25/2023-cycle 8 of Taxotere.  · Patient was admitted to the hospital on 4/28/2023 due to multiple new strokes.  · Scheduled for chemotherapy next week.  MRI is  · MRI of the brain 4/28/2023 shows small increase in size of the metastatic lesions in the brain by 1 to 2 mm.  · Patient continues to have extremely poor functional status.  · We will reassess next week and resume chemo if she is functionally better.     *History of bilateral stage I breast cancer  · Patient with bilateral breast cancer, both stage I ([id number]), grade 2, ER/CT positive and HER2/melinda negative with low Ki-67.    · Patient initiated adjnuvant letrozole in May 2021.  · Letrozole discontinued as she is on Taxotere  · No changes    *Mental status changes  · Patient's mental status is back at baseline.  · Significant improvement  · Currently off all psychotropic medications.  · MRI of the brain 3/28/2023 with slight improvement in the 9 lesions noted previously  · Mental status remains at baseline  · 4/28/2023-MRI shows new strokes     *Cancer related pain  · Patient has been receiving Duragesic patch 50 mcg every 72 hours in addition to oxycodone 15 mg every 4 hours for breakthrough pain.  Duragesic dose had been escalated recently in the outpatient setting due to worsening pain  · On admission 9/23/2022, Duragesic patch increased to 75 mcg daily, added Decadron 4 mg IV every 6 hours with continuation of oxycodone 15 mg every 4 hours as needed for breakthrough pain in addition of Dilaudid 1 mg every 2 hours as needed for breakthrough pain.  · Patient with increasing side effects from narcotics, Duragesic decreased on 9/25/2022 from 75 down to 50 mcg  patch every 72 hours.  · Dexamethasone dose decreased to 4 mg p.o. every 12 hours on 9/27/2022  · Initiated palliative radiation to the right sacrum on 9/28/2022  · Patient became confused after receiving Dilaudid.  Patient and family asked to discontinue Dilaudid.  · Duragesic patch dose was increased to 75 mcg/h on 9/30/2022. Subsequent decrease to 50 mcg due to confusion  · Gabapentin was discontinued due to the sedating effect.  · Oxycodone is being used for breakthrough pain.  · 10/3 transition to sustained release oxycodone 40 mg bid and fentanyl patch stopped  · Poorly controlled, pain particularly worse in sitting and standing position.  · Unsure if extends is contributing to mental status changes as previously patient had been very sensitive to pain meds.  · Due to this reason we will discontinue the Xtampza ER and switch to OxyContin 20 mg twice daily.  · She continues to experience severe mental status changes.  · Long-acting pain medications discontinued due to mental status changes and she is currently on oxycodone 15 mg as needed.  · Reports better pain control after the epidural injection of the spine.  · Reports severe pain in her sacral area while she was moving in bed.  Currently taking OxyContin and oxycodone.  · Continue current regimen.  · If no improvement in symptoms may have to repeat scans.      *Anxiety/depression  · Currently all medications have been discontinued due to mental status changes  · Continue Xanax as needed  · Poorly controlled today due to inadequate pain control and bilateral lower extremity edema     *Insomnia  · She has been receiving Xanax as needed       *Anemia  · Hemoglobin 7.6 3/14/2023.  B12, ferritin, iron studies, and folate unremarkable.  · 1 unit of PRBC given 3/16/2023  · 3/21/2023 hemoglobin 8.4  · 3/14/2023-iron studies reviewed and iron saturation 24%, TIBC 329, ferritin 189  · Vitamin B12 borderline at 376, folic acid normal at greater than 20  · Hemoglobin  decreased to 6.9 while she was in the hospital and received 1 unit of PRBC on 4/28/2023.  · Hemoglobin today 9.6.     *Peripheral neuropathy  · Patient was on gabapentin 300 mg twice daily.  · Gabapentin was discontinued on 9/30/2022.  · Neuropathy stable  · Patient noting heaviness to both lower extremities when seen on 3/21/2023, possible worsening neuropathy secondary to Taxotere?  Reevaluate further at next visit once anticoagulant changed and possibly acute DVT pain eliminated.  · Likely some worsening.  Now with bilateral lower extremity edema.  · Reassess next week prior to proceeding with chemotherapy     *Nausea vomiting and abdominal discomfort  · Continues to experience vomiting.  · Abdominal discomfort has resolved  · PET/CT with liver metastasis.  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  · Continue Compazine as well as Zofran as needed  · Nausea and vomiting have pretty much resolved.  · Noted to have area of focal uptake on the PET/CT over the gallbladder which is suggestive of cholecystitis.    *Mouth sores  · 3/21/2023 patient reporting mouth sores or not being helped with her current Magic mouth rinse.  Discussed with Brooklynn Powell RN and current prescription does not have steroids added, requested dexamethasone to be added to new prescription.    *GI prophylaxis  · Continue omeprazole daily  · No changes  · PET/CT suggestive of esophagitis    *Deconditioning  · Much worse since recent admission to hospital.  · She has not been able to ambulate much due to significant bilateral lower extremity edema.  · Also has severe back pain.    *Hypokalemia  · Potassium normal at 3.8    *Brain metastasis  · Small increase in size of the metastatic lesions by 1 to 2 mm    *Abnormal LFTs  · LFTs normal today    *Acute left soleal deep vein thrombosis 3/21/2023  · Patient seen 3/21/2023 after having a left lower extremity Doppler due to reports of severe left lower extremity pain with redness  and some swelling noted.  Preliminary results positive for acute soleal DVT.  Patient continues on Eliquis 5 mg twice daily and denies missing doses.  Case discussed with Dr. Snell and we will obtain additional lab work including beta-2 glycoprotein, lupus anticoagulant, and anticardiolipin antibody.  We will proceed with switching her Eliquis to Xarelto 15 mg twice daily x21 days then 20 mg daily thereafter.  She was given samples for the loading dose and will need a prescription at a later date for the 20 mg once daily.  · Hypercoagulability work-up particularly antiphospholipid syndrome labs have been reviewed and negative  · Xarelto discontinued due to multiple new strokes  · Currently on Lovenox.  Patient is having significant trouble tolerating Lovenox.  · We will discontinue Lovenox and start Coumadin  · Pharmacy will be consulted    *Excessive bleeding  · She had a recent fall injuring her right forearm resulting in excessive bleeding.  · She is also on therapeutic Lovenox which is making the significantly worse.  · Patient is also complaining of trouble with Lovenox injections resulting in large bruises on her abdomen.  · We will change the treatment to Coumadin.    *Leukocytosis  · Likely reactive  · Continue to monitor  ·   *bilateral lower extremity edema  · Secondary to venous stasis  · Recommend compression and elevation of the lower extremities        RECOMMENDATIONS:    1. Consult pharmacy regarding Coumadin dosing.  Discontinue Lovenox once INR is therapeutic  2. Discussed with Dr. Patty العلي regarding need for radiation  3. Compression socks for lower extremity edema  4. She is scheduled for chemotherapy next week.  Patient reports that she is not ready to proceed with more chemotherapy.  If her functional status continues to appear poor then we may have to consider comfort measures.  5. We will also have to reassess the neuropathy again prior to proceeding with chemotherapy next  week.      I spent 45 minutes caring for Holli on this date of service. This time includes time spent by me in the following activities: preparing for the visit, reviewing tests, obtaining and/or reviewing a separately obtained history, performing a medically appropriate examination and/or evaluation, counseling and educating the patient/family/caregiver, ordering medications, tests, or procedures, documenting information in the medical record, independently interpreting results and communicating that information with the patient/family/caregiver and care coordination.       Salma Snell MD

## 2023-05-09 ENCOUNTER — READMISSION MANAGEMENT (OUTPATIENT)
Dept: CALL CENTER | Facility: HOSPITAL | Age: 58
End: 2023-05-09
Payer: COMMERCIAL

## 2023-05-09 NOTE — OUTREACH NOTE
"Stroke Week 2 Survey    Flowsheet Row Responses   [facility] patient discharged from? [facility]   Does the patient have one of the following disease processes/diagnoses(primary or secondary)? Stroke   Week 2 attempt successful? Yes   Call start time 1532   Call end time 1536   Discharge diagnosis Acute embolic CVAs left MCA distribution, Metastatic non-small cell adenocarcinoma of the lung,Septic olecranon bursitis-MSSA left, anemia, thrombocytopenia, leukocytosis, recent DVT LLE    Meds reviewed with patient/caregiver? Yes   Is the patient taking all medications as directed (includes completed medication regime)? Yes   Medication comments \"Pharmacist is working on different types of blood thinners\" per pt    Comments regarding appointments 5/10/23 telephone visit with pain management    Comments regarding PCP PCP apt in \"six or seven weeks\"    Has the patient kept scheduled appointments due by today? Yes  [5/8/23 oncology apt (lab work) ]   Has home health visited the patient within 72 hours of discharge? N/A   Psychosocial issues? No   Does the patient require any assistance with activities of daily living such as eating, bathing, dressing, walking, etc.? Yes   ADL comments walks with a walker-prior to admission per pt    Does the patient have any residual symptoms from stroke/TIA? No   Residual symptoms comments Overall weakness (not new per pt)   Does the patient understand the diet ordered at discharge? Yes   What is the patient's perception of their health status since discharge? Returned to baseline/stable   Nursing interventions Nurse provided patient education   Is the patient able to teach back FAST for Stroke? B alance: Watch for sudden loss of balance, E yes: Check for vision loss, F ace: Look for an uneven smile, A rm: Check if one arm is weak, S peech: Listen for slurred speech, T otis: Call 9-1-1 right away   Is the patient/caregiver able to teach back the risk factors for a stroke? History of " TIAs, High blood pressure-goal below 120/80   Is the patient/caregiver able to teach back signs and symptoms related to disease process for when to call PCP? Yes   Is the patient/caregiver able to teach back signs and symptoms related to disease process for when to call 911? Yes   Is the patient/caregiver able to teach back the hierarchy of who to call/visit for symptoms/problems? PCP, Specialist, Home health nurse, Urgent Care, ED, 911 Yes   Week 2 call completed? Yes   Revoked No further contact(revokes)-requires comment   Graduated/Revoked comments Pt states she is back to baseline-no issues or concerns during call           Kimberly H - Registered Nurse

## 2023-05-10 ENCOUNTER — OFFICE VISIT (OUTPATIENT)
Dept: PAIN MEDICINE | Facility: CLINIC | Age: 58
End: 2023-05-10
Payer: COMMERCIAL

## 2023-05-10 DIAGNOSIS — M54.16 LUMBAR RADICULOPATHY: Primary | ICD-10-CM

## 2023-05-10 DIAGNOSIS — M48.061 LUMBAR FORAMINAL STENOSIS: ICD-10-CM

## 2023-05-10 DIAGNOSIS — M25.551 BILATERAL HIP PAIN: ICD-10-CM

## 2023-05-10 DIAGNOSIS — M25.552 BILATERAL HIP PAIN: ICD-10-CM

## 2023-05-10 NOTE — PROGRESS NOTES
TELEPHONE VISIT    You have chosen to receive care through a telephone visit. Do you consent to use a telephone visit for your medical care today? Yes    Identity verified via  and Home address  Location of patient: Private restidence  Location of Provider: [facility] [place] [facility] office  Anyone else present: Yes, if yes- who-   Type of Technology used: Telephone    CHIEF COMPLAINT: Back and bilateral hip pain    Subjective   Holli Patel is a 57 y.o. female  who presents for a telephonic follow-up.She has a history of back and hip pain d/t metastatic non-small cell lung cancer.  She completed a Right L5 TFESI on 2023 performed by Dr. Santiago. She reports significant relief last ~ 10 weeks with this procedure and then her pain returned to baseline.     Today her pain is a 2/10VAS at rest. Since her last office visit she has had to return to utilize OxyCodone ER 20 mg BID in addition to OxyCodone 15 mg PRN to control her pain. This is managed by Dr. Snell (Oncology).  She was also hospitalized from 2023 to 2023 with multiple acute CVAs in the distribution of the Left MCA. At that time her Xarelto was discontinued and she was placed on Lovenox. She is now being transitioned to coumadin.     Back Pain  This is a chronic problem. The problem occurs constantly. The problem has been gradually worsening since onset. The pain is present in the lumbar spine. The quality of the pain is described as stabbing and aching. Radiates to: bilateral hips. The pain is at a severity of 2/10 (becomes severe with out her current opioid regimen). Exacerbated by: activity, getting up to the bedside commode  Associated symptoms include weakness. Pertinent negatives include no dysuria. She has tried analgesics (TFESI) for the symptoms.      The following portions of the patient's history were reviewed and updated as appropriate: allergies, current medications, past family history, past medical history, past social  history, past surgical history and problem list.    Review of Systems   Constitutional: Positive for activity change (worsening) and fatigue. Negative for chills.   Eyes: Negative for visual disturbance.   Cardiovascular: Positive for leg swelling.   Genitourinary: Negative for difficulty urinating, dyspareunia and dysuria.   Musculoskeletal: Positive for arthralgias (bilateral hips) and back pain.   Neurological: Positive for weakness.   Psychiatric/Behavioral: Positive for sleep disturbance. Negative for self-injury and suicidal ideas. The patient is nervous/anxious.      --  The aforementioned information the Chief Complaint section and above subjective data including any HPI data, and also the Review of Systems data, has been personally reviewed and affirmed.  --    Vitals:    05/10/23 1034   PainSc:   2   PainLoc: Hip       Objective   Physical Exam  As this is a telephone check-in, the ability to perform a routine physical exam is extremely limited. The patient seems alert and is oriented appropriately.   On this phone call there is not any evidence of respiratory distress.   The patient seems of normal mood.   The remainder of a routine physical exam is deferred.      Assessment & Plan   Diagnoses and all orders for this visit:    1. Lumbar radiculopathy (Primary)  -     Ibuprofen 3 %, Gabapentin 10 %, Baclofen 2 %, lidocaine 4 %, Ketamine HCl 4 %; Apply 1-2 g topically to the appropriate area as directed 3 (Three) to 4 (Four) times daily.  Dispense: 90 g; Refill: 0    2. Lumbar foraminal stenosis    3. Bilateral hip pain  -     Ibuprofen 3 %, Gabapentin 10 %, Baclofen 2 %, lidocaine 4 %, Ketamine HCl 4 %; Apply 1-2 g topically to the appropriate area as directed 3 (Three) to 4 (Four) times daily.  Dispense: 90 g; Refill: 0      ----------------  A telephone visit was chosen due to the Ms. Wick limited mobility and ability tolerate travel from pain d/t her metastatic lung cancer.    ----------------    Total time of discussion was 12 minutes.    --- With her new diagnosis of CVA we will need clearance from Dr. Snell to hold her coumadin x 5 days prior to repeating her TFESI. Reviewed with Ms. Patel and her  that we may not be able to proceed due to her elevated risk of clots and further strokes.   --- Consider Bilateral Hip injections. I will discuss the utility of this procedure with Dr. Santiago prior to proceeding.   --- Trial Compounded pain cream.   --- Continue to work with Dr. Snell with regards to her oral pain medication regimen.   --- I will notify Ms. Patel and her  once I have discussed her case with both Royal Snell and Pamela. At that time follow up will be decided.      CLARE CAMERON    As the clinician, I personally reviewed the CLARE from 5/10/2023 while the patient was in the office today.    -------    EMR Dragon/Transcription disclaimer:   Much of this encounter note is an electronic transcription/translation of spoken language to printed text. The electronic translation of spoken language may permit erroneous, or at times, nonsensical words or phrases to be inadvertently transcribed; Although I have reviewed the note for such errors, some may still exist.

## 2023-05-12 ENCOUNTER — LAB (OUTPATIENT)
Dept: LAB | Facility: HOSPITAL | Age: 58
End: 2023-05-12
Payer: COMMERCIAL

## 2023-05-12 ENCOUNTER — ANTICOAGULATION VISIT (OUTPATIENT)
Dept: ONCOLOGY | Facility: HOSPITAL | Age: 58
End: 2023-05-12
Payer: COMMERCIAL

## 2023-05-12 DIAGNOSIS — C79.70 NON-SMALL CELL LUNG CANCER METASTATIC TO ADRENAL GLAND: Primary | ICD-10-CM

## 2023-05-12 DIAGNOSIS — I82.402 ACUTE DEEP VEIN THROMBOSIS (DVT) OF LEFT LOWER EXTREMITY, UNSPECIFIED VEIN: Primary | ICD-10-CM

## 2023-05-12 DIAGNOSIS — C34.90 NON-SMALL CELL LUNG CANCER METASTATIC TO ADRENAL GLAND: Primary | ICD-10-CM

## 2023-05-12 LAB
INR PPP: 1.4 (ref 0.9–1.1)
PROTHROMBIN TIME: 16.9 SECONDS (ref 11–13.5)

## 2023-05-12 PROCEDURE — 85610 PROTHROMBIN TIME: CPT

## 2023-05-12 PROCEDURE — G0463 HOSPITAL OUTPT CLINIC VISIT: HCPCS

## 2023-05-12 PROCEDURE — 36416 COLLJ CAPILLARY BLOOD SPEC: CPT

## 2023-05-12 NOTE — PROGRESS NOTES
Anticoagulation Clinic Progress Note    Patient's visit was held in office today.    Anticoagulation Summary  As of 2023    INR goal:  2.0-3.0   TTR:  --   INR used for dosin.40 (2023)   Warfarin maintenance plan:  4 mg (4 mg x 1) every day; Starting 2023   Weekly warfarin total:  28 mg   Plan last modified:  Marta Ferguson RPH (2023)   Next INR check:  2023   Priority:  Critical   Target end date:  Indefinite    Indications    Acute deep vein thrombosis (DVT) of left lower extremity  unspecified vein [I82.402]             Anticoagulation Episode Summary     INR check location:      Preferred lab:      Send INR reminders to:  [facility] CBC ANTICOAG POOL    Comments:  [facility] lab/in clinic New to warf May 2023      Anticoagulation Care Providers     Provider Role Specialty Phone number    Salma Snell MD Referring Hematology and Oncology [phone]          Clinic Interview:  Patient Findings     Positives:  Signs/symptoms of bleeding, Change in medications, Bruising    Comments:  Keflex complete ; patient fell/cut arm - on and off   bleeding discontinued about 3 days; soaked bandages; significant bruising   w/ Lovenox injection      Clinical Outcomes     Comments:  Keflex complete ; patient fell/cut arm - on and off   bleeding discontinued about 3 days; soaked bandages; significant bruising   w/ Lovenox injection        Education:  Holli Patel is a new warfarin patient in the Hematology/Oncology Anticoagulation Clinic. We discussed the following and provided written information:    1) Warfarin's indication, mechanism, and dosing  2) Enforced the importance of taking warfarin as instructed and at the same time every day, preferably in the evening so that we can make dose adjustments more easily following subsequent clinic visits  3) What she should do about a missed dose; pts can take missed doses within about 12 hours of their usual scheduled dose, but she was  instructed on the importance of not doubling up on doses unless told to do so by the Anticoagulation Clinic.  4) Explained possible side effects of warfarin therapy, including increased risk of bleeding and discussed signs/symptoms of bleeding or further clotting.   5) Discussed monitoring of warfarin, the INR, goal INR range, and the frequency of monitoring.  6) Reviewed drug/food/tobacco/EtOH interactions with emphasis on understanding how green, leafy vegetables interact with warfarin.  7) Instructed the pt to inform her physician/pharmacist of any new medications and reminded her to inform us of any dietary changes  8) Explained that clinic visits would be required more frequently in the first few weeks of therapy then if stable dosing is achieved the patient will follow up in clinic every 4 weeks, on average.    Additional notes: 5/12; Booster dose today and Sunday 1.5 tablets (6mg) and RTC 5/16; To stop Keflex 5/13; [facility] appt 5/16; significant swelling arms/feet; fell and cut arm - bled on/off 3 days; bleeding has stopped. Would like to sched steroid injection for back pain, but understands that if epidural scheduled bridging with Lovenox would be required. Instructed to call if any changes in diet, OTC or RX drugs, changes in health, scheduled for chemotherapy on 5/16 - pt will discuss w/ [facility], would like a chemo break.       She expressed understanding of the information provided and has no additional questions at this time.    Holli BIRGIT Patel was presented with a copy of the Patient Rights and Responsibilities. she expressed verbal consent and signed agreement to receive care in the Anticoagulation Clinic under the current collaborative care agreement between pharmacists and Hematology/Oncology Clinic providers.      INR History:      Latest Ref Rng & Units 4/26/2021     7:53 AM 5/18/2021     7:19 AM 11/26/2021    10:55 AM 5/8/2023     3:43 PM 5/12/2023     9:30 AM 5/12/2023     9:45 AM    Anticoagulation Monitoring   INR       1.40   INR Date       5/12/2023   INR Goal     2.0-3.0  2.0-3.0   Trend       Same   Last Week Total     0 mg  12 mg   Next Week Total     24 mg  32 mg   Sun     -  6 mg (5/14)   Mon     Hold (5/8)  4 mg   Tue     4 mg  -   Wed     4 mg  -   Thu     4 mg  -   Fri     -  6 mg (5/12)   Sat     -  4 mg   Historical INR 0.90 - 1.10 0.92   0.97   1.01    1.40      Visit Report     Report         Plan:  1. INR is Subtherapeutic today- see above in Anticoagulation Summary.   Will instruct Holli Patel to Change their warfarin regimen - increase dose to 6mg (1.5 tablets) tonight and Sunday f/u in clinic Tuesday 5/16 - see above in Anticoagulation Summary.  2. Follow up in 3 days  3. Verbal and written information provided. Patient expresses understanding and has no further questions at this time.    Shanita Thomas [facility]

## 2023-05-12 NOTE — PROGRESS NOTES
[facility]              Follow Up      Patient Name: Holli Patel  : 1965  Primary Care Physician: Vandana Gastelum MD        Chief Complaint:  Left elbow swelling    HPI:   Holli Patel is a 57 y.o. year old who presents today for evaluation of left elbow swelling/pain. Off antibiotics now less than 48 hours. Having trouble with blood thinner but improving. No fevers or chills. She was evaluated by myself about 3 weeks ago, unfortunately developed some stroke like symptoms, acute DVT and was admitted to the hospital which, at least for the elbow, helped to expedite her consult to ID for MSSA septic olecranon bursitis. She was treated with IV cefazolin, then d/c on keflex PO. She has finished that treatment and is doing well in regard to the elbow.       Past Medical/Surgical, Social and Family History:  I have reviewed and/or updated pertinent history as noted in the medical record including:  Past Medical History:   Diagnosis Date   • Anxiety    • Clot     POSSIBLE BLOOD CLOT IN VENOUS ACCESS DEVICE-PT STATES WAS STARTED ON ELIQUIS    • Dyspnea on exertion    • Elevated cholesterol    • Frequent urination     DAY AND NIGHT   • SHAYY (generalized anxiety disorder)     RELATED HIGH BLOOD PRESSURE   • GERD (gastroesophageal reflux disease)    • High blood pressure     ANXIETY RELATED   • Hyperlipidemia    • Hypothyroidism    • Lung cancer    • Lung nodule     LEFT   • Malignant neoplasm of upper-outer quadrant of right breast in female, estrogen receptor positive 2021    PT STATES HAS BILAT BREAST CANCER   • Migraine    • PONV (postoperative nausea and vomiting)      Past Surgical History:   Procedure Laterality Date   • BREAST BIOPSY Bilateral 2021    Right Breast 10 o'clock & Left breast 10 o'clock 6 cm from nipple, [facility]   • CLOSED REDUCTION HIP DISLOCATION Left 2021    Procedure: BRONCHOSCOPY, LEFT VIDEO ASSITED THORACOSCOPY, ROBOTIC ASSSITED MEDIASTINAL LYMPHADENECTOMY WITH  INTERCOSTAL NERVE BLOCKS;  Surgeon: Raphael Kerr III, MD;  Location: [facility] MAIN OR;  Service: DaVinci;  Laterality: Left;   • COLONOSCOPY     • EPIDURAL Bilateral 2022    Procedure: LUMBAR/SACRAL TRANSFORAMINAL EPIDURAL Bilateral L5-S1;  Surgeon: Breann Santiago MD;  Location: [facility] MAIN OR;  Service: Pain Management;  Laterality: Bilateral;   • EPIDURAL Right 2023    Procedure: LUMBAR/SACRAL TRANSFORAMINAL EPIDURAL Right L5-S1 39598 (Hold eliqus 3 days);  Surgeon: Breann Santiago MD;  Location: [facility] MAIN OR;  Service: Pain Management;  Laterality: Right;   • TUBAL ABDOMINAL LIGATION     • VENOUS ACCESS DEVICE (PORT) INSERTION Right 2021    Procedure: INSERTION VENOUS ACCESS DEVICE;  Surgeon: Raphael Kerr III, MD;  Location: [facility] MAIN OR;  Service: Thoracic;  Laterality: Right;   • VENOUS ACCESS DEVICE (PORT) INSERTION Right 2021    Procedure: REVISION AND REPLACEMENT RIGHT SUBCLAVIAN VENOUS ACCESS PORT;  Surgeon: Raphael Kerr III, MD;  Location: [facility] MAIN OR;  Service: Thoracic;  Laterality: Right;   • WRIST SURGERY Right      Social History     Occupational History     Employer: [employer]   Tobacco Use   • Smoking status: Former     Packs/day: 1.00     Years: 30.00     Pack years: 30.00     Types: Electronic Cigarette, Cigarettes     Start date: 1981     Quit date:      Years since quittin.3   • Smokeless tobacco: Never   • Tobacco comments:     ABT 15 CIGARETTES DAILY   Vaping Use   • Vaping Use: Some days   • Substances: Nicotine, Flavoring   • Devices: Disposable   Substance and Sexual Activity   • Alcohol use: Never   • Drug use: Never   • Sexual activity: Yes     Partners: Male          Allergies: No Known Allergies    Medications:   Home Medications:  Current Outpatient Medications on File Prior to Visit   Medication Sig   • [START ON 2023] ALPRAZolam (XANAX) 0.5 MG tablet 1 BY MOUTH EVERY 6 HOURS, CHANGE IN QUANTITY, METASTATIC  CANCER   • atorvastatin (LIPITOR) 80 MG tablet Take 1 tablet by mouth Every Night.   • cephalexin (Keflex) 500 MG capsule Take 1 capsule by mouth 4 (Four) Times a Day.   • dexamethasone (DECADRON) 2 MG tablet Take 1 tablet by mouth Daily. Take at noon   • dexamethasone (DECADRON) 4 MG tablet Take 2 tablets oral twice a day for 3 consecutive days beginning the day before chemotherapy and continue for 6 doses.   • Enoxaparin Sodium (LOVENOX) 80 MG/0.8ML solution prefilled syringe syringe Waste 0.15 mL and Inject 0.65 mL under the skin into the appropriate area as directed Every 12 (Twelve) Hours. Indications: DVT/PE (active thrombosis)   • folic acid (FOLVITE) 1 MG tablet TAKE 1 TABLET BY MOUTH EVERY DAY   • Ibuprofen 3 %, Gabapentin 10 %, Baclofen 2 %, lidocaine 4 %, Ketamine HCl 4 % Apply 1-2 g topically to the appropriate area as directed 3 (Three) to 4 (Four) times daily.   • letrozole (FEMARA) 2.5 MG tablet Take 1 tablet by mouth Daily.   • naloxone (Narcan) 4 MG/0.1ML nasal spray 1 spray into the nostril(s) as directed by provider As Needed (high dose mme or any narcotic with benzo). (Patient taking differently: 1 spray into the nostril(s) as directed by provider As Needed (high dose mme or any narcotic with benzo). Has not use)   • omeprazole (PrilOSEC) 20 MG capsule Take 1 capsule by mouth 2 (Two) Times a Day.   • ondansetron (Zofran) 8 MG tablet Take 1 tablet by mouth Every 8 (Eight) Hours As Needed for Nausea or Vomiting.   • oxybutynin (DITROPAN) 5 MG tablet Take 1 tablet by mouth 3 (Three) Times a Day.   • oxyCODONE (ROXICODONE) 15 MG immediate release tablet Take 1 tablet by mouth Every 4 (Four) Hours As Needed for Moderate Pain.   • potassium chloride (MICRO-K) 10 MEQ CR capsule TAKE 2 CAPSULES BY MOUTH EVERY DAY   • warfarin (Coumadin) 4 MG tablet Take one tablet daily or as directed by clinic   • rizatriptan (MAXALT) 10 MG tablet Take 1 tablet by mouth 1 (One) Time for 1 dose. AT ONSET OF HEADACHE MAY  REPEAT ONE TABLET IN 2 HOURS IF NEEDED     No current facility-administered medications on file prior to visit.         ROS:  ROS negative except as listed in the HPI.    Physical Exam:   57 y.o. female  Body mass index is 26.22 kg/m²., 67.1 kg (148 lb)  Vitals:    05/15/23 0835   Temp: 97.3 °F (36.3 °C)     General: Alert, cooperative, appears well and in no observable distress. Appears stated age and BMI as listed above.  HEENT: Normocephalic, atraumatic on external visual inspection.  Respiratory: Normal respiratory effort.  Skin: Warm & well perfused; appropriate skin turgor. Bruising.  Psych: Appropriate mood & affect.  Neuro: Gross sensation and motor intact in affected extremity/extremities.  Vascular: Peripheral pulses palpable in affected extremity/extremities.     MSK Exam:  Physical exam of the L elbow reveals no significant fluid collections/edema. She has some trace bursal fluid at the olecranon, callous remains intact. No redness, warmth or tenderness. No drainage. ROM full without pain. Strength reasonable.      Radiology:    No new images were needed at the visit today.     Procedure:   N/A      Misc. Data/Labs: N/A    Assessment & Plan:    ICD-10-CM ICD-9-CM   1. Septic olecranon bursitis of left elbow  M71.122 726.33     041.9   2. Elbow pain, chronic, left  M25.522 719.42    G89.29 338.29     No orders of the defined types were placed in this encounter.    No orders of the defined types were placed in this encounter.    Septic olecranon bursitis, MSSA. She has improved on appropriate antibiotics. We talked about precautions to watch for- increased swelling pain or redness. We discussed avoiding areas of friction to protect the area. Do not remove the callous, no neosporin. Just keep that clean and dry. She will follow up as needed. I asked her to call or message me right away if symptoms return, we would probably opt to have ID manage this medically rather than surgically if at all possible.   Saul evaluated this patient with me today as well and agrees with the plan as above.     Return if symptoms worsen or fail to improve.    Patient encouraged to call with questions or concerns prior to follow up.  Recommend ICE and/or HEAT PRN as discussed.  Will discuss with attending physician as needed.  Consider additional referrals, work up and/or advanced imaging as indicated or if patient fails to respond to conservative care.        KORI Loza      Dictated Utilizing Dragon Dictation

## 2023-05-15 ENCOUNTER — OFFICE VISIT (OUTPATIENT)
Dept: ORTHOPEDIC SURGERY | Facility: CLINIC | Age: 58
End: 2023-05-15
Payer: COMMERCIAL

## 2023-05-15 VITALS — TEMPERATURE: 97.3 F | HEIGHT: 63 IN | BODY MASS INDEX: 26.22 KG/M2 | WEIGHT: 148 LBS

## 2023-05-15 DIAGNOSIS — G89.29 ELBOW PAIN, CHRONIC, LEFT: ICD-10-CM

## 2023-05-15 DIAGNOSIS — M25.522 ELBOW PAIN, CHRONIC, LEFT: ICD-10-CM

## 2023-05-15 DIAGNOSIS — M71.122 SEPTIC OLECRANON BURSITIS OF LEFT ELBOW: Primary | ICD-10-CM

## 2023-05-16 ENCOUNTER — INFUSION (OUTPATIENT)
Dept: ONCOLOGY | Facility: HOSPITAL | Age: 58
End: 2023-05-16
Payer: COMMERCIAL

## 2023-05-16 ENCOUNTER — ANTICOAGULATION VISIT (OUTPATIENT)
Dept: ONCOLOGY | Facility: HOSPITAL | Age: 58
End: 2023-05-16
Payer: COMMERCIAL

## 2023-05-16 ENCOUNTER — OFFICE VISIT (OUTPATIENT)
Dept: ONCOLOGY | Facility: CLINIC | Age: 58
End: 2023-05-16
Payer: COMMERCIAL

## 2023-05-16 ENCOUNTER — LAB (OUTPATIENT)
Dept: LAB | Facility: HOSPITAL | Age: 58
End: 2023-05-16
Payer: COMMERCIAL

## 2023-05-16 VITALS
DIASTOLIC BLOOD PRESSURE: 83 MMHG | RESPIRATION RATE: 18 BRPM | TEMPERATURE: 97.3 F | BODY MASS INDEX: 25.87 KG/M2 | WEIGHT: 146 LBS | HEIGHT: 63 IN | HEART RATE: 95 BPM | OXYGEN SATURATION: 99 % | SYSTOLIC BLOOD PRESSURE: 125 MMHG

## 2023-05-16 DIAGNOSIS — Z45.2 ENCOUNTER FOR FITTING AND ADJUSTMENT OF VASCULAR CATHETER: ICD-10-CM

## 2023-05-16 DIAGNOSIS — C34.90 NON-SMALL CELL LUNG CANCER METASTATIC TO BONE: Primary | ICD-10-CM

## 2023-05-16 DIAGNOSIS — G89.3 CANCER RELATED PAIN: ICD-10-CM

## 2023-05-16 DIAGNOSIS — C50.411 MALIGNANT NEOPLASM OF UPPER-OUTER QUADRANT OF RIGHT BREAST IN FEMALE, ESTROGEN RECEPTOR POSITIVE: ICD-10-CM

## 2023-05-16 DIAGNOSIS — C79.51 NON-SMALL CELL LUNG CANCER METASTATIC TO BONE: Primary | ICD-10-CM

## 2023-05-16 DIAGNOSIS — Z17.0 MALIGNANT NEOPLASM OF UPPER-OUTER QUADRANT OF RIGHT BREAST IN FEMALE, ESTROGEN RECEPTOR POSITIVE: ICD-10-CM

## 2023-05-16 DIAGNOSIS — I82.402 ACUTE DEEP VEIN THROMBOSIS (DVT) OF LEFT LOWER EXTREMITY, UNSPECIFIED VEIN: Primary | ICD-10-CM

## 2023-05-16 DIAGNOSIS — Z45.2 ENCOUNTER FOR FITTING AND ADJUSTMENT OF VASCULAR CATHETER: Primary | ICD-10-CM

## 2023-05-16 DIAGNOSIS — C79.51 NON-SMALL CELL LUNG CANCER METASTATIC TO BONE: ICD-10-CM

## 2023-05-16 DIAGNOSIS — C34.90 NON-SMALL CELL LUNG CANCER METASTATIC TO BONE: ICD-10-CM

## 2023-05-16 DIAGNOSIS — C50.212 MALIGNANT NEOPLASM OF UPPER-INNER QUADRANT OF LEFT BREAST IN FEMALE, ESTROGEN RECEPTOR POSITIVE: Primary | ICD-10-CM

## 2023-05-16 DIAGNOSIS — Z17.0 MALIGNANT NEOPLASM OF UPPER-INNER QUADRANT OF LEFT BREAST IN FEMALE, ESTROGEN RECEPTOR POSITIVE: Primary | ICD-10-CM

## 2023-05-16 LAB
ALBUMIN SERPL-MCNC: 3.7 G/DL (ref 3.5–5.2)
ALBUMIN/GLOB SERPL: 1.7 G/DL (ref 1.1–2.4)
ALP SERPL-CCNC: 101 U/L (ref 38–116)
ALT SERPL W P-5'-P-CCNC: 20 U/L (ref 0–33)
ANION GAP SERPL CALCULATED.3IONS-SCNC: 11.1 MMOL/L (ref 5–15)
AST SERPL-CCNC: 18 U/L (ref 0–32)
BASOPHILS # BLD AUTO: 0.05 10*3/MM3 (ref 0–0.2)
BASOPHILS NFR BLD AUTO: 0.4 % (ref 0–1.5)
BILIRUB SERPL-MCNC: <0.2 MG/DL (ref 0.2–1.2)
BUN SERPL-MCNC: 23 MG/DL (ref 6–20)
BUN/CREAT SERPL: 23.7 (ref 7.3–30)
CALCIUM SPEC-SCNC: 8.7 MG/DL (ref 8.5–10.2)
CHLORIDE SERPL-SCNC: 106 MMOL/L (ref 98–107)
CO2 SERPL-SCNC: 22.9 MMOL/L (ref 22–29)
CREAT SERPL-MCNC: 0.97 MG/DL (ref 0.6–1.1)
DEPRECATED RDW RBC AUTO: 68.6 FL (ref 37–54)
EGFRCR SERPLBLD CKD-EPI 2021: 67.5 ML/MIN/1.73
EOSINOPHIL # BLD AUTO: 0.04 10*3/MM3 (ref 0–0.4)
EOSINOPHIL NFR BLD AUTO: 0.3 % (ref 0.3–6.2)
ERYTHROCYTE [DISTWIDTH] IN BLOOD BY AUTOMATED COUNT: 19.5 % (ref 12.3–15.4)
GLOBULIN UR ELPH-MCNC: 2.2 GM/DL (ref 1.8–3.5)
GLUCOSE SERPL-MCNC: 89 MG/DL (ref 74–124)
HCT VFR BLD AUTO: 28.4 % (ref 34–46.6)
HGB BLD-MCNC: 8.3 G/DL (ref 12–15.9)
IMM GRANULOCYTES # BLD AUTO: 0.57 10*3/MM3 (ref 0–0.05)
IMM GRANULOCYTES NFR BLD AUTO: 4.2 % (ref 0–0.5)
INR PPP: 3.8 (ref 0.9–1.1)
LYMPHOCYTES # BLD AUTO: 0.67 10*3/MM3 (ref 0.7–3.1)
LYMPHOCYTES NFR BLD AUTO: 5 % (ref 19.6–45.3)
MCH RBC QN AUTO: 29 PG (ref 26.6–33)
MCHC RBC AUTO-ENTMCNC: 29.2 G/DL (ref 31.5–35.7)
MCV RBC AUTO: 99.3 FL (ref 79–97)
MONOCYTES # BLD AUTO: 0.7 10*3/MM3 (ref 0.1–0.9)
MONOCYTES NFR BLD AUTO: 5.2 % (ref 5–12)
NEUTROPHILS NFR BLD AUTO: 11.46 10*3/MM3 (ref 1.7–7)
NEUTROPHILS NFR BLD AUTO: 84.9 % (ref 42.7–76)
NRBC BLD AUTO-RTO: 0 /100 WBC (ref 0–0.2)
PLATELET # BLD AUTO: 134 10*3/MM3 (ref 140–450)
PMV BLD AUTO: 8.9 FL (ref 6–12)
POTASSIUM SERPL-SCNC: 3.9 MMOL/L (ref 3.5–4.7)
PROT SERPL-MCNC: 5.9 G/DL (ref 6.3–8)
PROTHROMBIN TIME: 45.6 SECONDS (ref 11–13.5)
RBC # BLD AUTO: 2.86 10*6/MM3 (ref 3.77–5.28)
SODIUM SERPL-SCNC: 140 MMOL/L (ref 134–145)
WBC NRBC COR # BLD: 13.49 10*3/MM3 (ref 3.4–10.8)

## 2023-05-16 PROCEDURE — 85610 PROTHROMBIN TIME: CPT

## 2023-05-16 PROCEDURE — 36591 DRAW BLOOD OFF VENOUS DEVICE: CPT

## 2023-05-16 PROCEDURE — 85025 COMPLETE CBC W/AUTO DIFF WBC: CPT

## 2023-05-16 PROCEDURE — 80053 COMPREHEN METABOLIC PANEL: CPT

## 2023-05-16 PROCEDURE — 25010000002 HEPARIN LOCK FLUSH PER 10 UNITS: Performed by: INTERNAL MEDICINE

## 2023-05-16 RX ORDER — OXYCODONE HYDROCHLORIDE 15 MG/1
15 TABLET ORAL EVERY 4 HOURS PRN
Qty: 180 TABLET | Refills: 0 | Status: CANCELLED | OUTPATIENT
Start: 2023-05-16

## 2023-05-16 RX ORDER — HEPARIN SODIUM (PORCINE) LOCK FLUSH IV SOLN 100 UNIT/ML 100 UNIT/ML
500 SOLUTION INTRAVENOUS AS NEEDED
Status: CANCELLED | OUTPATIENT
Start: 2023-05-16

## 2023-05-16 RX ORDER — HEPARIN SODIUM (PORCINE) LOCK FLUSH IV SOLN 100 UNIT/ML 100 UNIT/ML
500 SOLUTION INTRAVENOUS AS NEEDED
OUTPATIENT
Start: 2023-05-16

## 2023-05-16 RX ORDER — SODIUM CHLORIDE 0.9 % (FLUSH) 0.9 %
10 SYRINGE (ML) INJECTION AS NEEDED
Status: DISCONTINUED | OUTPATIENT
Start: 2023-05-16 | End: 2023-05-16 | Stop reason: HOSPADM

## 2023-05-16 RX ORDER — HEPARIN SODIUM (PORCINE) LOCK FLUSH IV SOLN 100 UNIT/ML 100 UNIT/ML
500 SOLUTION INTRAVENOUS AS NEEDED
Status: ACTIVE | OUTPATIENT
Start: 2023-05-16

## 2023-05-16 RX ORDER — HEPARIN SODIUM (PORCINE) LOCK FLUSH IV SOLN 100 UNIT/ML 100 UNIT/ML
500 SOLUTION INTRAVENOUS AS NEEDED
Status: DISCONTINUED | OUTPATIENT
Start: 2023-05-16 | End: 2023-05-16 | Stop reason: HOSPADM

## 2023-05-16 RX ORDER — SODIUM CHLORIDE 0.9 % (FLUSH) 0.9 %
10 SYRINGE (ML) INJECTION AS NEEDED
Status: ACTIVE | OUTPATIENT
Start: 2023-05-16

## 2023-05-16 RX ORDER — OXYCODONE HYDROCHLORIDE 15 MG/1
15 TABLET ORAL EVERY 4 HOURS PRN
Qty: 180 TABLET | Refills: 0 | Status: SHIPPED | OUTPATIENT
Start: 2023-05-16 | End: 2023-05-16

## 2023-05-16 RX ORDER — GABAPENTIN 100 MG/1
100 CAPSULE ORAL
Qty: 30 CAPSULE | Refills: 0 | Status: SHIPPED | OUTPATIENT
Start: 2023-05-16 | End: 2023-05-16

## 2023-05-16 RX ORDER — GABAPENTIN 100 MG/1
100 CAPSULE ORAL
Qty: 30 CAPSULE | Refills: 0 | Status: SHIPPED | OUTPATIENT
Start: 2023-05-16

## 2023-05-16 RX ORDER — SODIUM CHLORIDE 0.9 % (FLUSH) 0.9 %
10 SYRINGE (ML) INJECTION AS NEEDED
OUTPATIENT
Start: 2023-05-16

## 2023-05-16 RX ORDER — OXYCODONE HYDROCHLORIDE 15 MG/1
15 TABLET ORAL EVERY 4 HOURS PRN
Qty: 180 TABLET | Refills: 0 | Status: SHIPPED | OUTPATIENT
Start: 2023-05-16

## 2023-05-16 RX ORDER — SODIUM CHLORIDE 0.9 % (FLUSH) 0.9 %
10 SYRINGE (ML) INJECTION AS NEEDED
Status: CANCELLED | OUTPATIENT
Start: 2023-05-16

## 2023-05-16 RX ORDER — OXYCODONE HCL 20 MG/1
20 TABLET, FILM COATED, EXTENDED RELEASE ORAL EVERY 12 HOURS
Qty: 60 TABLET | Refills: 0 | Status: SHIPPED | OUTPATIENT
Start: 2023-05-16 | End: 2023-05-17 | Stop reason: SDUPTHER

## 2023-05-16 RX ORDER — GABAPENTIN 100 MG/1
100 CAPSULE ORAL
Qty: 30 CAPSULE | Refills: 0 | Status: CANCELLED | OUTPATIENT
Start: 2023-05-16

## 2023-05-16 RX ADMIN — Medication 500 UNITS: at 10:14

## 2023-05-16 RX ADMIN — Medication 10 ML: at 10:14

## 2023-05-16 NOTE — PROGRESS NOTES
Anticoagulation Clinic Progress Note    Patient's visit was held in MD exam room today.    Anticoagulation Summary  As of 5/16/2023    INR goal:  2.0-3.0   TTR:  --   INR used for dosing:  3.80 (5/16/2023)   Warfarin maintenance plan:  4 mg (4 mg x 1) every day; Starting 5/16/2023   Weekly warfarin total:  28 mg   Plan last modified:  Marta Ferguson RPH (5/8/2023)   Next INR check:  5/19/2023   Priority:  Critical   Target end date:  Indefinite    Indications    Acute deep vein thrombosis (DVT) of left lower extremity  unspecified vein [I82.402]             Anticoagulation Episode Summary     INR check location:      Preferred lab:      Send INR reminders to:   [facility] ON[facility] CBC ANTICOAG POOL    Comments:  [facility] lab/in clinic New to warf May 2023      Anticoagulation Care Providers     Provider Role Specialty Phone number    Salma Snell MD Referring Hematology and Oncology [phone]          Clinic Interview:  Patient Findings     Positives:  Bruising    Negatives:  Signs/symptoms of thrombosis, Signs/symptoms of bleeding,   Laboratory test error suspected, Change in health, Change in alcohol use,   Change in activity, Upcoming invasive procedure, Emergency department   visit, Upcoming dental procedure, Missed doses, Extra doses, Change in   medications, Change in diet/appetite, Hospital admission, Other complaints      Comments:  Bruising on R hand,lots of bruising on abdomen from enoxaparin   injections.  Feet remain red and swollen.  States no bleeding.        INR History:      Latest Ref Rng & Units 5/18/2021     7:19 AM 11/26/2021    10:55 AM 5/8/2023     3:43 PM 5/12/2023     9:30 AM 5/12/2023     9:45 AM 5/16/2023     8:43 AM 5/16/2023     9:15 AM   Anticoagulation Monitoring   INR      1.40  3.80   INR Date      5/12/2023 5/16/2023   INR Goal    2.0-3.0  2.0-3.0  2.0-3.0   Trend      Same  Same   Last Week Total    0 mg  12 mg  32 mg   Next Week Total    24 mg  32 mg  24 mg   Sun    -  6 mg (5/14)  -    Mon    Hold (5/8)  4 mg  -   Tue    4 mg  -  Hold (5/16)   Wed    4 mg  -  4 mg   Thu    4 mg  -  4 mg   Fri    -  6 mg (5/12)  -   Sat    -  4 mg  -   Historical INR 0.90 - 1.10 0.97   1.01    1.40    3.80      Visit Report    Report   Report Report       Plan:  1. INR is Supratherapeutic today- see above in Anticoagulation Summary. States no bleeding issues.  Will instruct Holli Patel to HOLD warfarin x 1 day, then warfarin 4mg daily- see above in Anticoagulation Summary.  2. Follow up in 3 days  3. Stop enoxaparin injections--she did take injection this am  4. Verbal and written information provided. Patient and spouse expresses understanding and has no further questions at this time.    Marta Ferguson [facility]

## 2023-05-16 NOTE — PROGRESS NOTES
[facility]IENT PROGRESS NOTE        CHIEF COMPLAINT/REASON FOR VISIT:  Metastatic non-small cell lung cancer  Bilateral breast cancer    Interval history  Lovenox was discontinued due to excessive bleeding and bruising.  She is currently on Coumadin.  INR supratherapeutic today at 3.8.  Patient denies any aches or bleeding or bruising.  Continues to have bilateral lower extremity edema which has not changed significantly.  She is complaining of severe neuropathy and reports being in pain and requesting pain medication as well as gabapentin to help control her symptoms.  She is due for Taxotere today however she does not want to proceed with Taxotere.    She tells me that she is in too much pain to proceed with chemotherapy today she feels like she does not have a good quality of life.  She is requesting that she be given pain medications adequately to control her pain.    Oncologic history  Ms. 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     3/1/2021-bilateral screening mammogram-microcalcifications seen in the posterior one third of the lateral aspect of the right breast.  Area of focal asymmetry seen in the middle one third of the upper inner quadrant of the left breast.     3/1/2021-DEXA scan-T score of -2.2 on the lumbar spine and T score of -2.3 in the left hip and T score of -1.9 in the right hip.  Findings consistent with osteopenia     3/23/2021-screening lung CT-there is a 10 x 11 mm solid nodule in the left upper lobe.  Enlarged AP window lymph node measuring 14 x 10 mm.  Suggestion of a possible 9 mm left hilar lymph node.  Heavy coronary artery calcification.     3/23/2021-diagnostic mammogram bilateral-cluster of microcalcifications in the middle third lateral aspect of the right breast.  Left breast demonstrates persistence of the area of focal asymmetry in the region.     Ultrasound-left breast  ultrasound at 10 o'clock position, 6 cm from the nipple there is a 0.4 cm irregular hypoechoic lesion.  Stereotactic biopsy of the right breast calcifications recommended.  Ultrasound-guided biopsy of the left breast lesion recommended     4/7/2021-right breast stereotactic biopsy and left breast ultrasound-guided biopsy  Pathology  Right breast-invasive ductal carcinoma, grade 2, lymphovascular invasion present, ER +99% strong, MA +80% moderate, HER-2 negative, Ki-67 12%  Left breast upper inner quadrant 10 o'clock position biopsy, invasive ductal carcinoma, grade   2, ER +99% strong, MA +40% strong, HER-2 2+ on immunohistochemistry, nonamplified on FISH Ki-67 10%     4/14/2021-PET/CT-FDG avid aortopulmonary window lymph node measures 0.9 cm with an SUV of 7.5.  FDG avid left hilar lymph node measures 1 cm with an SUV of 17.2.  Irregular soft tissue density in the right breast measuring 3.7 x 3.8 cm is favored to be secondary to the recent breast biopsy.  1.1 cm pulmonary nodule within the left upper lobe with SUV 9.7 .  Sub-6 mm pulmonary nodules are present in the right lower lobe.  No evidence of lymphadenopathy or metastatic disease in the abdomen.  Focal area of FDG uptake within the central canal posterior to T11-T12 displaced demonstrating an SUV of 5.5 thought to be reactive however MRI is recommended for further evaluation.     She was seen by Dr. Molina who initially planned for breast surgery however  due to the PET abnormalities she has been referred to Dr. Kerr who plans to do a wound on 4/30/2021.  Dr. Molina's and Dr. Kerr's notes reviewed.     Patient denies any family history of breast cancer.  Her mother had lymphoma.  Maternal grandmother had colon cancer.  Prior to that screening mammogram she did not have any palpable abnormalities of either breast.     She has been a heavy smoker for about 40 years and smoked 2 packs/day.  Denies any recent weight changes, new bone pains, cough,  abdominal pain nausea vomiting constipation or diarrhea.  She does have anxiety and has been on several medications.  She has recently been started on Xanax to help with the mood and also with insomnia.     Patient had a video-assisted thoracoscopy and mediastinal lymphadenectomy on 4/30/2021.  L5-L6 lymph nodes were biopsied however the small pulmonary nodule in the left upper lobe was not difficult to find.  Pathology showed moderately differentiated adenocarcinoma which is CK7 and TTF-1 positive.  Consistent with lung primary.  Ki-67 85%.     5/14/2021-MRI of the brain-minimal chronic small vessel ischemic change in the white matter.  Otherwise negative MRI.     5/20/2021-bilateral axillary ultrasound-no evidence of axillary lymphadenopathy in either axilla.  Normal-appearing bilateral axillary lymph nodes visualized.     Cycle 1 cisplatin and Alimta on 5/25/2021.     Cycle 2 cisplatin Alimta 6/15/2021     Cycle 3 cisplatin Alimta 7/7/2021     Completed radiation on 7/12/2021     Port was nonfunctional hence a port study was performed which showed that the port was backed up into the innominate vein and also there was a nonocclusive thrombus at the end of the catheter extending into the superior vena cava.  She was started on anticoagulation with Eliquis.  It was recommended for port revision of the intention to keep the port.     PET/CT 8/5/2021-images independently reviewed and interpreted by me-decrease in size and FDG uptake of the left upper lobe pulmonary nodule as well as mediastinal and left hilar lymphadenopathy representing response to treatment.  Sub-6 mm pulmonary nodule in the left upper lobe new since 4/14/2021.  This could be related to radiation however follow-up CT in 3 months recommended.  Decrease in size of the irregular masslike tissue in the right breast which is postbiopsy hematoma.  This is not PET avid.  New segmental left eighth rib fractures healing.  A new band of sclerosis of the left  seventh rib which favored to represent healing nondisplaced fracture.  Follow-up CT recommended.  focal uptake in the duodenum first and second portions.  Could be related to duodenitis.  Indeterminate lesion in the L1 vertebral body not well evaluated on PET/CT.     10/1/2021-MRI of the lumbar spine.  Lesion in the left posterior body of L1 measures 14 x 14 x 12 mm and previously 5 x 5 x 6 mm.  The interval enlargement strongly suggest that it is metastatic lesion.  No additional osseous metastasis noted.   Other chronic changes noted.     10/14/2021-biopsy of the lumbar spine lesion-pathology consistent with poorly differentiated carcinoma.  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     10/26/2021-brain MRI-no evidence of metastatic disease.     10/26/2021-bilateral diagnostic mammogram and ultrasound  Scattered fibroglandular density in both breast.  Postbiopsy hematoma with internal biopsy clip in the upper outer quadrant of the right breast, 8 cm from the nipple.  The hematoma size is decreased to 2.9 cm from 3.6 cm earlier.     Left breast-parenchymal density measuring 9 x 10 mm has markedly decreased.  Few adjacent calcifications which are unchanged.  No new abnormality in the left breast.  At 10:00 region there is some minimal adjacent hypoechoic texture which is difficult to measure but appears smaller since the previous exam.     10/28/2021-PET/CT  New L1 and L2 lytic bone metastasis annually intensely hypermetabolic focus at the left adrenal gland likely represents metastatic disease as well.  Slight decrease in size of the 0.9 x 0.7 cm left upper lobe pulmonary nodule.  There is hypermetabolic activity related to radiation pneumonitis.  The remainder of the nodule is photopenic.  Uncertain etiology of the more intense activity along the right lateral peripheral margin of the 2.6 cm postbiopsy density of the right  breast.     She completed radiation to to the lumbar spine on 11/19/2021 11/22/2021-cycle 1 Alimta and Keytruda     3/23/2022-bilateral diagnostic mammogram and ultrasound  Complete resolution of the microcalcifications in the upper outer quadrant of the right breast and decrease in size of the postbiopsy hematoma.  No suspicious abnormalities in the right breast.  Benign-appearing calcifications at the site of malignancy in the left breast upper inner quadrant.  No other suspicious findings.     4/11/2022 PET/CT-there is new groundglass opacities in the left upper lobe which are most likely postradiation changes.  Other groundglass opacities in the left lung as well as the right lung remained stable.  Increased nodularity of the left adrenal gland with an SUV of 5.6, previously 3.5.    Increased uptake in the L1 vertebra in the right posterior aspect with an SUV of 3.6.  Left L1 sclerosis increased     Due to this the case was discussed in multidisciplinary conference.  The disease progression was significant in the left adrenal gland as well as the L1 vertebra.  Since there were only 2 areas of disease progression it has been decided to proceed with radiation to these 2 spots and continue on Keytruda and Alimta.     Patient also has had worsening of her kidney function.  We initially held treatment as of 5/31/2022 and creatinine bumped up to 2.0, BUN 23.  We then resumed therapy 6/11/2022 with Keytruda alone.  Patient was referred to nephrology.     6/6/2022-MRI of the spine-diffuse marrow replacement in the L1 vertebral body and inferior endplate concavity.  Suspicious for metastatic disease with pathological fracture in the inferior endplate.  Also diffuse signal abnormality in the L2 lamina on the left suspicious for additional metastatic disease.  Abnormality of the first sacral segment suspicious for metastatic disease.  Left adrenal gland appears enlarged.  30 to 40% height loss and L2 vertebral body  suggestive of a subacute compression fracture.  Degenerative changes.     7/18/2022-PET/CT  Multiple hypermetabolic osseous lesions with index lesions which have either increased in degree of FDG uptake or newly hypermetabolic lesions representative of metastatic disease and progression of disease.  Possible FDG avid lesion in the left femoral diaphysis however these are incompletely visualized and in the area of artifactual FDG uptake.  MRI of the left femur recommended for further evaluation.  Increasing FDG uptake of the left adrenal gland.  Focal left hilar hypermetabolic him corresponds to the lymph node which has minimally increased in size compared to April 2022.  New sub-6 mm pulmonary nodules.     7/26/2022-cycle 1 of Taxotere     Patient was admitted to the hospital 8/3/2022 discharged on 8/9/2022.  She was admitted for strokelike symptoms and confusion.  The confusion was thought to be secondary to polypharmacy and probably febrile neutropenia.  She was treated with antibiotics.  Mental status improved subsequently.  MRI of the cervical thoracic and lumbar spine was performed on 8/4/2022.  Images independently reviewed by me.  Multiple metastatic lesions in the spine noted.  There was a lesion on the CT which was concerning.  There is also a sacral Lesion measuring up to 5 cm in transverse dimension on the right.  Patient was symptomatic with pain on the right side of the sacrum.  Radiation oncology recommended radiation to the cervical spine as well as sacrum.  CT head without any obvious abnormalities.     Completed radiation to the cervical spine and sacrum on 8/12/2022    She was admitted to the hospital between 9/23/2022 to 10/12/2022.  She was admitted for poorly controlled pain as well as encephalopathy and multiple falls.  She was diagnosed with a UTI and treated with IV Rocephin.  Pain medications changed and also additional radiation to the right sacrum was performed.  3000 cGy in 10 fractions  was administered.  She was discharged on oxycodone 15 mg every 4 hours as needed for pain and OxyContin twice daily.  She was recommended to go to a subacute rehab however she preferred to be discharged home with home physical therapy and Occupational Therapy    9/24/2022-MRI of the pelvis showed osseous metastatic disease in the sacrum worst within the right and probable pathological fractures bilaterally.    10/4/2022 CT head without any evidence of metastatic disease or acute abnormalities.    10/25/2022-PET/CT-resolution of hypermetabolic activity in the upper lobe and left hilum.  Previously noted new tiny pulmonary nodules have resolved.  Resolution of hypermetabolic activity in the left adrenal gland.  Near complete resolution of hypermetabolic activity in the skeletal metastasis particularly the cervical spine, pelvic bones and proximal left femur.  Maximal SUV at the right sacral metastasis is 2.7 and previously it was 8.9.  Tiny focus of hypermetabolic activity at the GE junction with a maximal SUV of 4.7.  No definite thickening or abnormality noted on the CT.    11/15/2022-lumbar puncture with negative cytology of the CSF.    11/21/2022-MRI of the brain with contrast and MRI of the cervical spine with contrast without any evidence of metastatic disease.    1/12/2023-PET/CT  Hypermetabolic mediastinal lymphadenopathy and multiple liver lesions which are hypermetabolic suggestive of metastasis.  New tiny left pleural effusion is noted.  Progression of bilateral sacral fractures and right L5 transverse process fracture.  There is also an acute/subacute L1 fracture.  Lumbar spine x-rays have been recommended for evaluation of the vertebral height or lumbar MRI to be compared to the priors.    1/23/2023-MRI of the brain  At least 9 separate tiny 2 to 6 mm enhancing lesions in the brain compatible with multiple new brain metastasis.  There has been interval development of 3 separate foci of encephalomalacia  "concerning for chronic infarcts but these are new since August 2022.  Stable osseous metastasis at C2.    3/27/2023-PET/CT  Significant interval improvement of the previously seen hypermetabolic mediastinal lymphadenopathy as well as hepatic lesions.  Focal area of uptake noted in the gallbladder fundus raising concern for cholecystitis.  0.6 cm pulmonary nodule in the left upper lobe is new and follow-up CT in 3 months recommended.  New bilateral groundglass opacities in lower lobes suggestive of atypical pneumonia.  Long segment mild to moderate uptake within the esophagus suggestive of esophagitis.    3/28/2023-MRI of the brain  There are at least 9 separate 2 to 6 mm enhancing lesions in the superficial cortex of the brain which have shown a slight interval decrease in size and also decreased enhancement noted.  No new enhancing lesions noted.  Concern for focal areas of leptomeningeal seeding.  Unchanged from previous MRI tiny old lacunar infarcts in the left MCA territory.     MRI of the brain 4/28/2023-multiple new strokes in the MCA territory.  1 to 2 mm increase in size of the previously known metastatic lesions    Admitted to the hospital and evaluated by neurology and treatment has been changed to Lovenox.  2D echocardiogram without any evidence of blood clots.    Vitals:    05/16/23 0900   BP: 125/83   Pulse: 95   Resp: 18   Temp: 97.3 °F (36.3 °C)   TempSrc: Temporal   SpO2: 99%   Weight: 66.2 kg (146 lb)  Comment: Pt provided average weight   Height: 160 cm (62.99\")   PainSc:   6   PainLoc: Foot         PHYSICAL EXAMINATION:    General: Oriented to person, place, and time.  Ill-appearing.  Pale.  HEENT: EOMI.    Chest/Lungs: Clear to auscultation bilaterally. Right chest mediport in place  Heart: RRR, sinus tachycardia  Extremities: Significant bilateral lower extremity edema.  Extensive bruising noted on the upper extremities.    I have reexamined the patient and the results are consistent with the " previously documented exam. Salma Snell MD     DIAGNOSTIC DATA:  Results Review:        Results from last 7 days   Lab Units 05/16/23  0852   WBC 10*3/mm3 13.49*   HEMOGLOBIN g/dL 8.3*   HEMATOCRIT % 28.4*   PLATELETS 10*3/mm3 134*     Lab Results   Component Value Date    NEUTROABS 11.46 (H) 05/16/2023     Results from last 7 days   Lab Units 05/16/23  0852   SODIUM mmol/L 140   POTASSIUM mmol/L 3.9   CHLORIDE mmol/L 106   CO2 mmol/L 22.9   BUN mg/dL 23*   CREATININE mg/dL 0.97   GLUCOSE mg/dL 89   CALCIUM mg/dL 8.7     Results from last 7 days   Lab Units 05/16/23  0843 05/12/23  0930   INR  3.80* 1.40*             IMAGING:    Duplex Venous Lower Extremity - Left CAR (03/21/2023 10:25)    PET/CT 1/12/2023 and MRI of the brain 1/23/2023-images independently reviewed and interpreted by me.    ASSESSMENT:  This is a 57 y.o. female with:     *Metastatic non-small cell lung cancer  · She was initially diagnosed with stage III (T1N2M0) disease.    · She received initial therapy with cisplatin and Alimta concurrent with radiation therapy beginning 5/25/2021.    · There were indeterminate findings in the lumbar spine at L1 and it was recommended to monitor this over time.    · Patient completed 3 cycles cisplatin and Alimta 7/7/2021 and completed radiation therapy 7/12/2021.    · PET scan 8/6/2021 showed good response to treatment.    · MRI lumbar spine 10/1/2021 with increase in size of the L1 lesion.    · Biopsy of the L1 lesion on 10/14/2022 confirmed metastatic adenocarcinoma of lung origin, PD-L1 TPS 0.    · PET scan 10/26/2021 with involvement of left adrenal and L1/L2 only.    · Patient received radiation therapy to L1/L2 on 11/19/2021.  She also received radiation to the left adrenal gland.    · She initiated further systemic therapy on 11/22/2021 with Keytruda and Alimta.   · Follow-up PET scan 1/18/2022 with decrease in small left upper lobe pulmonary nodule, resolution of activity at L1/L2, no new sites of  disease.    · Guardant 360 CT DNA level was monitored and was decreasing.    · PET scan 4/11/2022 with progression in left adrenal and L1.    · Maintained systemic therapy with Keytruda and Alimta and received additional radiation to left adrenal and L1.  Alimta however held since 5/10/2022 due to renal dysfunction.  Radiation completed to lumbar spine 7/14/2022.    · PET scan 7/18/2022 with multiple areas of progression of the spine and right sacrum.    · Guardant 360 analysis 7/20/2022 with no actionable mutations.    · Change in therapy to single agent palliative Taxotere initiated 7/26/2022.    · Altered mental status and febrile neutropenia requiring hospitalization 8/2 - 8/9/2022.  During that admission, MRI brain, cervical, thoracic, lumbar spine performed 8/4/2022 in addition to CT cervical spine 8/5/2022.  There was evidence of C2 metastatic lesion with some dural enhancement, compression deformity at T7 with mild edema suggesting acute to subacute fracture, 5 mm T12 spinous process metastasis, multifocal disease in the lumbar spine and sacrum, largest involving sacral ala to the right 5 cm in transverse dimension.  Loss of height at L2 25%.  · Patient received cycle 3 Taxotere 9/6/2022 with Neulasta support.    · She has been continuing as well on Xgeva every 4 weeks (last received 9/6/2022).  · Patient did undergo MRI lumbar spine and pelvis on 9/24/2022.  MRI pelvis showed bilateral sacral fractures with marrow infiltration related to metastasis more prominent on the right.  Lesion on the right 3.5 x 4.1 x 4.3 cm.  Also probable metastasis along posterior acetabulum on the right 1.5 cm.  MRI lumbar spine with stable L1 metastasis, new edema endplate T12 possibly stress reaction versus developing new metastasis, lesion at T12 spinous process unchanged.   · Radiation oncology consulted with plans for palliative treatment of the right sacral metastasis.  Treatment initiated 9/28/2022  · Completed radiation  to the right sacral metastasis on 10/11/2022.  · PET/CT 10/25/2022 without any metabolic uptake in the lung or the skeletal metastasis.  This is indicative of a positive response to Taxotere.  · Pain persistent sitting and walking position and not present when she is laying down.  · Patient is still very active and performance status is very poor.  Due to this reason chemotherapy will be continued to be held.  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  · Due to this reason an MRI of the brain was obtained but unfortunately this was performed without contrast.  Reviewed the MRI and no evidence of metastatic disease.  · 11/15/2022 lumbar puncture shows no evidence of malignancy in the CSF, elevated protein at 65.2, glucose normal at 58, culture no growth in 3 days  · Repeat MRI of the brain with contrast 11/21/2022 does not show any obvious abnormalities.  There is no enhancement of the CSF.  MRI of the cervical spine with no abnormalities.  · Patient unable to undergo MRI of the thoracic and lumbar spine due to mental status and unable to being in 1 position as well as with incontinence.  · 1/4/2023-mental status has improved significantly.  Patient reports decreased oral intake and nausea and vomiting.  She is also reporting abdominal discomfort.  · 1/12/2023-PET/CT with mediastinal lymphadenopathy as well as multiple liver lesions suggestive of disease progression.  · 1/23/2023-MRI of the brain with at least 9 different foci of small mets measuring up to 6 mm.  There is also areas of encephalomalacia concerning for chronic infarcts.    · Resumed Taxotere 1/31/2023.  Dose reduced to 60 mg per metered square.  · 3/27/2023-PET/CT shows significant response in the chest as well as the hepatic lesions.  · 3/28/2023-MRI of the brain-improvement in the metastatic disease to the brain with decrease in the size as well as decrease in enhancement  noted.  · 4/25/2023-cycle 8 of Taxotere.  · Patient was admitted to the hospital on 4/28/2023 due to multiple new strokes.  · Scheduled for chemotherapy next week.  MRI is  · MRI of the brain 4/28/2023 shows small increase in size of the metastatic lesions in the brain by 1 to 2 mm.  · Remains with extremely poor functional status.  She does not have a good quality of life.  · She is requesting that her pain be adequately controlled.  Jelani her  has been trying to hold back on some of the pain medications as he is concerned about the previous mental status changes that she had experienced.  · She reports severe neuropathy and not willing to proceed with chemotherapy today.  · I am definitely concerned if we continue with the Taxotere it may result in worsening neuropathy.  · [id number] performed in July 2022 did not show any significant targetable mutations.  · The next option for treatment would be gemcitabine 1000 mg per metered square on day 1/8/2015 q. 28 days.     *History of bilateral stage I breast cancer  · Patient with bilateral breast cancer, both stage I ([id number]), grade 2, ER/MI positive and HER2/melinda negative with low Ki-67.    · Patient initiated adjnuvant letrozole in May 2021.  · Letrozole discontinued as she is on Taxotere  · No changes    *Mental status changes  · Patient's mental status is back at baseline.  · Significant improvement  · Currently off all psychotropic medications.  · MRI of the brain 3/28/2023 with slight improvement in the 9 lesions noted previously  · Mental status remains at baseline  · 4/28/2023-MRI shows new strokes  · Mental status at baseline     *Cancer related pain  · Patient has been receiving Duragesic patch 50 mcg every 72 hours in addition to oxycodone 15 mg every 4 hours for breakthrough pain.  Duragesic dose had been escalated recently in the outpatient setting due to worsening pain  · On admission 9/23/2022, Duragesic patch increased to 75 mcg daily, added  Decadron 4 mg IV every 6 hours with continuation of oxycodone 15 mg every 4 hours as needed for breakthrough pain in addition of Dilaudid 1 mg every 2 hours as needed for breakthrough pain.  · Patient with increasing side effects from narcotics, Duragesic decreased on 9/25/2022 from 75 down to 50 mcg patch every 72 hours.  · Dexamethasone dose decreased to 4 mg p.o. every 12 hours on 9/27/2022  · Initiated palliative radiation to the right sacrum on 9/28/2022  · Patient became confused after receiving Dilaudid.  Patient and family asked to discontinue Dilaudid.  · Duragesic patch dose was increased to 75 mcg/h on 9/30/2022. Subsequent decrease to 50 mcg due to confusion  · Gabapentin was discontinued due to the sedating effect.  · Oxycodone is being used for breakthrough pain.  · 10/3 transition to sustained release oxycodone 40 mg bid and fentanyl patch stopped  · Poorly controlled, pain particularly worse in sitting and standing position.  · Unsure if extends is contributing to mental status changes as previously patient had been very sensitive to pain meds.  · Due to this reason we will discontinue the Xtampza ER and switch to OxyContin 20 mg twice daily.  · Mental status returned to baseline on holding the pain medications.  · However patient now reports significant pain in her back as well as her feet which is very poorly controlled.  · She is receiving oxycodone 20 mg for pain control.  · Previously she was on gabapentin but this was stopped due to the mental status changes.  We will send in prescription for gabapentin 100 mg nightly for the neuropathy.  · Palliative care referral limited PVR.  · Patient also expressed that she does not have a good quality of life due to an inadequate pain control at this time.      *Anxiety/depression  · Currently all medications have been discontinued due to mental status changes  · Continue Xanax as needed  · Tearful today due to inadequate pain control.  · Continue  oxycodone  · Referred to palliative care  · Start gabapentin     *Insomnia  · She has been receiving Xanax as needed  · Continue Xanax currently  · Referral to palliative care       *Anemia  · Hemoglobin 7.6 3/14/2023.  B12, ferritin, iron studies, and folate unremarkable.  · 1 unit of PRBC given 3/16/2023  · 3/21/2023 hemoglobin 8.4  · 3/14/2023-iron studies reviewed and iron saturation 24%, TIBC 329, ferritin 189  · Vitamin B12 borderline at 376, folic acid normal at greater than 20  · Hemoglobin lower at 8.3 today.  · INR elevated at 3.8  · CBC on follow-up     *Peripheral neuropathy  · Patient was on gabapentin 300 mg twice daily.  · Gabapentin was discontinued on 9/30/2022.  · Neuropathy stable  · Patient noting heaviness to both lower extremities when seen on 3/21/2023, possible worsening neuropathy secondary to Taxotere?  Reevaluate further at next visit once anticoagulant changed and possibly acute DVT pain eliminated.  · Continues to experience severe neuropathy.  · We will discontinue Taxotere as her symptoms are very poorly controlled at this time and I am worried that Taxotere would worsen the neuropathy.  · Start gabapentin 100 mg nightly     *Nausea vomiting and abdominal discomfort  · Continues to experience vomiting.  · Abdominal discomfort has resolved  · PET/CT with liver metastasis.  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  · Continue Compazine as well as Zofran as needed  · Nausea and vomiting have pretty much resolved.  · Noted to have area of focal uptake on the PET/CT over the gallbladder which is suggestive of cholecystitis.    *Mouth sores  · Not an issue today    *GI prophylaxis  · Continue omeprazole daily  · No changes  · PET/CT suggestive of esophagitis    *Deconditioning  · Secondary to significant lower extremity edema, severe poorly controlled back pain.  · Offered physical therapy and Occupational Therapy however patient refused  · She plans to try to do some exercises  at home by herself    *Hypokalemia  · Potassium normal at 3.8    *Brain metastasis  · Small increase in size of the metastatic lesions by 1 to 2 mm    *Abnormal LFTs  · LFTs normal today    *Acute left soleal deep vein thrombosis 3/21/2023  · Patient seen 3/21/2023 after having a left lower extremity Doppler due to reports of severe left lower extremity pain with redness and some swelling noted.  Preliminary results positive for acute soleal DVT.  Patient continues on Eliquis 5 mg twice daily and denies missing doses.  Case discussed with Dr. Snell and we will obtain additional lab work including beta-2 glycoprotein, lupus anticoagulant, and anticardiolipin antibody.  We will proceed with switching her Eliquis to Xarelto 15 mg twice daily x21 days then 20 mg daily thereafter.  She was given samples for the loading dose and will need a prescription at a later date for the 20 mg once daily.  · Hypercoagulability work-up particularly antiphospholipid syndrome labs have been reviewed and negative  · Xarelto discontinued due to multiple new strokes  · Currently on Lovenox.  Patient is having significant trouble tolerating Lovenox.  · INR noted to be 3.8 on Coumadin  · Pharmacy managing the INR    *Excessive bleeding  · She had a recent fall injuring her right forearm resulting in excessive bleeding.  · She is also on therapeutic Lovenox which is making the significantly worse.  · Patient is also complaining of trouble with Lovenox injections resulting in large bruises on her abdomen.  · Continue Coumadin, INR 3.8    *Leukocytosis  · Likely reactive  · Continue to monitor    *bilateral lower extremity edema  · Secondary to venous stasis  · Recommend compression and elevation of the lower extremities  · She has not used compression.  · She has been elevating the lower extremities some.        RECOMMENDATIONS:    1. Coumadin dosing by pharmacy  2. Compression socks for lower extremities  3. Discontinue Taxotere  4. Start  gemcitabine  5. Referred to palliative care  6. Start gabapentin 100 mg nightly  7. Refill oxycodone.      I spent 45 minutes caring for Holli on this date of service. This time includes time spent by me in the following activities: preparing for the visit, reviewing tests, obtaining and/or reviewing a separately obtained history, performing a medically appropriate examination and/or evaluation, counseling and educating the patient/family/caregiver, ordering medications, tests, or procedures, documenting information in the medical record, independently interpreting results and communicating that information with the patient/family/caregiver and care coordination.       Salma Snell MD  [facility] OUTPATIENT PROGRESS NOTE        CHIEF COMPLAINT/REASON FOR VISIT:  Metastatic non-small cell lung cancer  Bilateral breast cancer    Interval history  Patient presents to the clinic today for follow-up.  She was recently admitted to the hospital after an outpatient MRI of the brain demonstrated new strokes.  Patient called the clinic for follow-up  On 4/27/2023 and complained of transient right upper extremity weakness.  MRI of the brain performed 4/28/2023 demonstrated multiple small acute infarcts in the left middle cerebral artery territory.  Some equivocal increase in size of the multiple metastatic lesions by maybe 1 to 2 mm.    Xarelto was discontinued and treatment was changed to Lovenox while she was in the hospital.  Patient is complaining of multiple bruises on her abdomen from the Lovenox injections.  She fell on her way to the bathroom and injured her right forearm resulting in significant amount of bleeding.  They had to hold the Lovenox injection last night because of the excessive bleeding.  She is also complaining of severe pain in her back that started a few days ago.  Mr. Patel is giving her OxyContin in addition to the oxycodone 15 mg to help with pain control.  She feels like the  pain has been improving.    She is also experiencing worsening bilateral lower extremity edema.  She has been sitting for the most part and unable to ambulate much because of the back pain.  She is also not able to lay in bed due to the severe back pain.  She tried using compression socks but felt extremely uncomfortable and has not been using that.    Oncologic history  Ms. 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     3/1/2021-bilateral screening mammogram-microcalcifications seen in the posterior one third of the lateral aspect of the right breast.  Area of focal asymmetry seen in the middle one third of the upper inner quadrant of the left breast.     3/1/2021-DEXA scan-T score of -2.2 on the lumbar spine and T score of -2.3 in the left hip and T score of -1.9 in the right hip.  Findings consistent with osteopenia     3/23/2021-screening lung CT-there is a 10 x 11 mm solid nodule in the left upper lobe.  Enlarged AP window lymph node measuring 14 x 10 mm.  Suggestion of a possible 9 mm left hilar lymph node.  Heavy coronary artery calcification.     3/23/2021-diagnostic mammogram bilateral-cluster of microcalcifications in the middle third lateral aspect of the right breast.  Left breast demonstrates persistence of the area of focal asymmetry in the region.     Ultrasound-left breast ultrasound at 10 o'clock position, 6 cm from the nipple there is a 0.4 cm irregular hypoechoic lesion.  Stereotactic biopsy of the right breast calcifications recommended.  Ultrasound-guided biopsy of the left breast lesion recommended     4/7/2021-right breast stereotactic biopsy and left breast ultrasound-guided biopsy  Pathology  Right breast-invasive ductal carcinoma, grade 2, lymphovascular invasion present, ER +99% strong, MI +80% moderate, HER-2 negative, Ki-67 12%  Left breast upper inner quadrant 10 o'clock position biopsy, invasive  ductal carcinoma, grade   2, ER +99% strong, TN +40% strong, HER-2 2+ on immunohistochemistry, nonamplified on FISH Ki-67 10%     4/14/2021-PET/CT-FDG avid aortopulmonary window lymph node measures 0.9 cm with an SUV of 7.5.  FDG avid left hilar lymph node measures 1 cm with an SUV of 17.2.  Irregular soft tissue density in the right breast measuring 3.7 x 3.8 cm is favored to be secondary to the recent breast biopsy.  1.1 cm pulmonary nodule within the left upper lobe with SUV 9.7 .  Sub-6 mm pulmonary nodules are present in the right lower lobe.  No evidence of lymphadenopathy or metastatic disease in the abdomen.  Focal area of FDG uptake within the central canal posterior to T11-T12 displaced demonstrating an SUV of 5.5 thought to be reactive however MRI is recommended for further evaluation.     She was seen by Dr. Molina who initially planned for breast surgery however  due to the PET abnormalities she has been referred to Dr. Kerr who plans to do a wound on 4/30/2021.  Dr. Molina's and Dr. Kerr's notes reviewed.     Patient denies any family history of breast cancer.  Her mother had lymphoma.  Maternal grandmother had colon cancer.  Prior to that screening mammogram she did not have any palpable abnormalities of either breast.     She has been a heavy smoker for about 40 years and smoked 2 packs/day.  Denies any recent weight changes, new bone pains, cough, abdominal pain nausea vomiting constipation or diarrhea.  She does have anxiety and has been on several medications.  She has recently been started on Xanax to help with the mood and also with insomnia.     Patient had a video-assisted thoracoscopy and mediastinal lymphadenectomy on 4/30/2021.  L5-L6 lymph nodes were biopsied however the small pulmonary nodule in the left upper lobe was not difficult to find.  Pathology showed moderately differentiated adenocarcinoma which is CK7 and TTF-1 positive.  Consistent with lung primary.  Ki-67  85%.     5/14/2021-MRI of the brain-minimal chronic small vessel ischemic change in the white matter.  Otherwise negative MRI.     5/20/2021-bilateral axillary ultrasound-no evidence of axillary lymphadenopathy in either axilla.  Normal-appearing bilateral axillary lymph nodes visualized.     Cycle 1 cisplatin and Alimta on 5/25/2021.     Cycle 2 cisplatin Alimta 6/15/2021     Cycle 3 cisplatin Alimta 7/7/2021     Completed radiation on 7/12/2021     Port was nonfunctional hence a port study was performed which showed that the port was backed up into the innominate vein and also there was a nonocclusive thrombus at the end of the catheter extending into the superior vena cava.  She was started on anticoagulation with Eliquis.  It was recommended for port revision of the intention to keep the port.     PET/CT 8/5/2021-images independently reviewed and interpreted by me-decrease in size and FDG uptake of the left upper lobe pulmonary nodule as well as mediastinal and left hilar lymphadenopathy representing response to treatment.  Sub-6 mm pulmonary nodule in the left upper lobe new since 4/14/2021.  This could be related to radiation however follow-up CT in 3 months recommended.  Decrease in size of the irregular masslike tissue in the right breast which is postbiopsy hematoma.  This is not PET avid.  New segmental left eighth rib fractures healing.  A new band of sclerosis of the left seventh rib which favored to represent healing nondisplaced fracture.  Follow-up CT recommended.  focal uptake in the duodenum first and second portions.  Could be related to duodenitis.  Indeterminate lesion in the L1 vertebral body not well evaluated on PET/CT.     10/1/2021-MRI of the lumbar spine.  Lesion in the left posterior body of L1 measures 14 x 14 x 12 mm and previously 5 x 5 x 6 mm.  The interval enlargement strongly suggest that it is metastatic lesion.  No additional osseous metastasis noted.   Other chronic changes  noted.     10/14/2021-biopsy of the lumbar spine lesion-pathology consistent with poorly differentiated carcinoma.  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     10/26/2021-brain MRI-no evidence of metastatic disease.     10/26/2021-bilateral diagnostic mammogram and ultrasound  Scattered fibroglandular density in both breast.  Postbiopsy hematoma with internal biopsy clip in the upper outer quadrant of the right breast, 8 cm from the nipple.  The hematoma size is decreased to 2.9 cm from 3.6 cm earlier.     Left breast-parenchymal density measuring 9 x 10 mm has markedly decreased.  Few adjacent calcifications which are unchanged.  No new abnormality in the left breast.  At 10:00 region there is some minimal adjacent hypoechoic texture which is difficult to measure but appears smaller since the previous exam.     10/28/2021-PET/CT  New L1 and L2 lytic bone metastasis annually intensely hypermetabolic focus at the left adrenal gland likely represents metastatic disease as well.  Slight decrease in size of the 0.9 x 0.7 cm left upper lobe pulmonary nodule.  There is hypermetabolic activity related to radiation pneumonitis.  The remainder of the nodule is photopenic.  Uncertain etiology of the more intense activity along the right lateral peripheral margin of the 2.6 cm postbiopsy density of the right breast.     She completed radiation to to the lumbar spine on 11/19/2021 11/22/2021-cycle 1 Alimta and Keytruda     3/23/2022-bilateral diagnostic mammogram and ultrasound  Complete resolution of the microcalcifications in the upper outer quadrant of the right breast and decrease in size of the postbiopsy hematoma.  No suspicious abnormalities in the right breast.  Benign-appearing calcifications at the site of malignancy in the left breast upper inner quadrant.  No other suspicious findings.     4/11/2022 PET/CT-there is new  groundglass opacities in the left upper lobe which are most likely postradiation changes.  Other groundglass opacities in the left lung as well as the right lung remained stable.  Increased nodularity of the left adrenal gland with an SUV of 5.6, previously 3.5.    Increased uptake in the L1 vertebra in the right posterior aspect with an SUV of 3.6.  Left L1 sclerosis increased     Due to this the case was discussed in multidisciplinary conference.  The disease progression was significant in the left adrenal gland as well as the L1 vertebra.  Since there were only 2 areas of disease progression it has been decided to proceed with radiation to these 2 spots and continue on Keytruda and Alimta.     Patient also has had worsening of her kidney function.  We initially held treatment as of 5/31/2022 and creatinine bumped up to 2.0, BUN 23.  We then resumed therapy 6/11/2022 with Keytruda alone.  Patient was referred to nephrology.     6/6/2022-MRI of the spine-diffuse marrow replacement in the L1 vertebral body and inferior endplate concavity.  Suspicious for metastatic disease with pathological fracture in the inferior endplate.  Also diffuse signal abnormality in the L2 lamina on the left suspicious for additional metastatic disease.  Abnormality of the first sacral segment suspicious for metastatic disease.  Left adrenal gland appears enlarged.  30 to 40% height loss and L2 vertebral body suggestive of a subacute compression fracture.  Degenerative changes.     7/18/2022-PET/CT  Multiple hypermetabolic osseous lesions with index lesions which have either increased in degree of FDG uptake or newly hypermetabolic lesions representative of metastatic disease and progression of disease.  Possible FDG avid lesion in the left femoral diaphysis however these are incompletely visualized and in the area of artifactual FDG uptake.  MRI of the left femur recommended for further evaluation.  Increasing FDG uptake of the left  adrenal gland.  Focal left hilar hypermetabolic him corresponds to the lymph node which has minimally increased in size compared to April 2022.  New sub-6 mm pulmonary nodules.     7/26/2022-cycle 1 of Taxotere     Patient was admitted to the hospital 8/3/2022 discharged on 8/9/2022.  She was admitted for strokelike symptoms and confusion.  The confusion was thought to be secondary to polypharmacy and probably febrile neutropenia.  She was treated with antibiotics.  Mental status improved subsequently.  MRI of the cervical thoracic and lumbar spine was performed on 8/4/2022.  Images independently reviewed by me.  Multiple metastatic lesions in the spine noted.  There was a lesion on the CT which was concerning.  There is also a sacral Lesion measuring up to 5 cm in transverse dimension on the right.  Patient was symptomatic with pain on the right side of the sacrum.  Radiation oncology recommended radiation to the cervical spine as well as sacrum.  CT head without any obvious abnormalities.     Completed radiation to the cervical spine and sacrum on 8/12/2022    She was admitted to the hospital between 9/23/2022 to 10/12/2022.  She was admitted for poorly controlled pain as well as encephalopathy and multiple falls.  She was diagnosed with a UTI and treated with IV Rocephin.  Pain medications changed and also additional radiation to the right sacrum was performed.  3000 cGy in 10 fractions was administered.  She was discharged on oxycodone 15 mg every 4 hours as needed for pain and OxyContin twice daily.  She was recommended to go to a subacute rehab however she preferred to be discharged home with home physical therapy and Occupational Therapy    9/24/2022-MRI of the pelvis showed osseous metastatic disease in the sacrum worst within the right and probable pathological fractures bilaterally.    10/4/2022 CT head without any evidence of metastatic disease or acute abnormalities.    10/25/2022-PET/CT-resolution of  hypermetabolic activity in the upper lobe and left hilum.  Previously noted new tiny pulmonary nodules have resolved.  Resolution of hypermetabolic activity in the left adrenal gland.  Near complete resolution of hypermetabolic activity in the skeletal metastasis particularly the cervical spine, pelvic bones and proximal left femur.  Maximal SUV at the right sacral metastasis is 2.7 and previously it was 8.9.  Tiny focus of hypermetabolic activity at the GE junction with a maximal SUV of 4.7.  No definite thickening or abnormality noted on the CT.    11/15/2022-lumbar puncture with negative cytology of the CSF.    11/21/2022-MRI of the brain with contrast and MRI of the cervical spine with contrast without any evidence of metastatic disease.    1/12/2023-PET/CT  Hypermetabolic mediastinal lymphadenopathy and multiple liver lesions which are hypermetabolic suggestive of metastasis.  New tiny left pleural effusion is noted.  Progression of bilateral sacral fractures and right L5 transverse process fracture.  There is also an acute/subacute L1 fracture.  Lumbar spine x-rays have been recommended for evaluation of the vertebral height or lumbar MRI to be compared to the priors.    1/23/2023-MRI of the brain  At least 9 separate tiny 2 to 6 mm enhancing lesions in the brain compatible with multiple new brain metastasis.  There has been interval development of 3 separate foci of encephalomalacia concerning for chronic infarcts but these are new since August 2022.  Stable osseous metastasis at C2.    3/27/2023-PET/CT  Significant interval improvement of the previously seen hypermetabolic mediastinal lymphadenopathy as well as hepatic lesions.  Focal area of uptake noted in the gallbladder fundus raising concern for cholecystitis.  0.6 cm pulmonary nodule in the left upper lobe is new and follow-up CT in 3 months recommended.  New bilateral groundglass opacities in lower lobes suggestive of atypical pneumonia.  Long segment  "mild to moderate uptake within the esophagus suggestive of esophagitis.    3/28/2023-MRI of the brain  There are at least 9 separate 2 to 6 mm enhancing lesions in the superficial cortex of the brain which have shown a slight interval decrease in size and also decreased enhancement noted.  No new enhancing lesions noted.  Concern for focal areas of leptomeningeal seeding.  Unchanged from previous MRI tiny old lacunar infarcts in the left MCA territory.     MRI of the brain 4/28/2023-multiple new strokes in the MCA territory.  1 to 2 mm increase in size of the previously known metastatic lesions    Admitted to the hospital and evaluated by neurology and treatment has been changed to Lovenox.  2D echocardiogram without any evidence of blood clots.    Vitals:    05/16/23 0900   BP: 125/83   Pulse: 95   Resp: 18   Temp: 97.3 °F (36.3 °C)   TempSrc: Temporal   SpO2: 99%   Weight: 66.2 kg (146 lb)  Comment: Pt provided average weight   Height: 160 cm (62.99\")   PainSc:   6   PainLoc: Foot         PHYSICAL EXAMINATION:    General: Oriented to person, place, and time.  Ill-appearing.  Pale.  HEENT: EOMI.  No bruising/edema noted.  Chest/Lungs: Clear to auscultation bilaterally. Right chest mediport in place  Heart: RRR, sinus tachycardia  Extremities: Bilateral lower extremity without any swelling.  Decreased strength in both lower extremities.  Left lower extremity with scant redness to upper foot  No focal deficits.    I have reexamined the patient and the results are consistent with the previously documented exam. Salma Snell MD     DIAGNOSTIC DATA:  Results Review:        Results from last 7 days   Lab Units 05/16/23  0852   WBC 10*3/mm3 13.49*   HEMOGLOBIN g/dL 8.3*   HEMATOCRIT % 28.4*   PLATELETS 10*3/mm3 134*     Lab Results   Component Value Date    NEUTROABS 11.46 (H) 05/16/2023     Results from last 7 days   Lab Units 05/16/23  0852   SODIUM mmol/L 140   POTASSIUM mmol/L 3.9   CHLORIDE mmol/L 106   CO2 " mmol/L 22.9   BUN mg/dL 23*   CREATININE mg/dL 0.97   GLUCOSE mg/dL 89   CALCIUM mg/dL 8.7     Results from last 7 days   Lab Units 05/16/23  0843 05/12/23  0930   INR  3.80* 1.40*             IMAGING:    Duplex Venous Lower Extremity - Left CAR (03/21/2023 10:25)    PET/CT 1/12/2023 and MRI of the brain 1/23/2023-images independently reviewed and interpreted by me.    ASSESSMENT:  This is a 57 y.o. female with:     *Metastatic non-small cell lung cancer  · She was initially diagnosed with stage III (T1N2M0) disease.    · She received initial therapy with cisplatin and Alimta concurrent with radiation therapy beginning 5/25/2021.    · There were indeterminate findings in the lumbar spine at L1 and it was recommended to monitor this over time.    · Patient completed 3 cycles cisplatin and Alimta 7/7/2021 and completed radiation therapy 7/12/2021.    · PET scan 8/6/2021 showed good response to treatment.    · MRI lumbar spine 10/1/2021 with increase in size of the L1 lesion.    · Biopsy of the L1 lesion on 10/14/2022 confirmed metastatic adenocarcinoma of lung origin, PD-L1 TPS 0.    · PET scan 10/26/2021 with involvement of left adrenal and L1/L2 only.    · Patient received radiation therapy to L1/L2 on 11/19/2021.  She also received radiation to the left adrenal gland.    · She initiated further systemic therapy on 11/22/2021 with Keytruda and Alimta.   · Follow-up PET scan 1/18/2022 with decrease in small left upper lobe pulmonary nodule, resolution of activity at L1/L2, no new sites of disease.    · Guardant 360 CT DNA level was monitored and was decreasing.    · PET scan 4/11/2022 with progression in left adrenal and L1.    · Maintained systemic therapy with Keytruda and Alimta and received additional radiation to left adrenal and L1.  Alimta however held since 5/10/2022 due to renal dysfunction.  Radiation completed to lumbar spine 7/14/2022.    · PET scan 7/18/2022 with multiple areas of progression of the  spine and right sacrum.    · Guardant 360 analysis 7/20/2022 with no actionable mutations.    · Change in therapy to single agent palliative Taxotere initiated 7/26/2022.    · Altered mental status and febrile neutropenia requiring hospitalization 8/2 - 8/9/2022.  During that admission, MRI brain, cervical, thoracic, lumbar spine performed 8/4/2022 in addition to CT cervical spine 8/5/2022.  There was evidence of C2 metastatic lesion with some dural enhancement, compression deformity at T7 with mild edema suggesting acute to subacute fracture, 5 mm T12 spinous process metastasis, multifocal disease in the lumbar spine and sacrum, largest involving sacral ala to the right 5 cm in transverse dimension.  Loss of height at L2 25%.  · Patient received cycle 3 Taxotere 9/6/2022 with Neulasta support.    · She has been continuing as well on Xgeva every 4 weeks (last received 9/6/2022).  · Patient did undergo MRI lumbar spine and pelvis on 9/24/2022.  MRI pelvis showed bilateral sacral fractures with marrow infiltration related to metastasis more prominent on the right.  Lesion on the right 3.5 x 4.1 x 4.3 cm.  Also probable metastasis along posterior acetabulum on the right 1.5 cm.  MRI lumbar spine with stable L1 metastasis, new edema endplate T12 possibly stress reaction versus developing new metastasis, lesion at T12 spinous process unchanged.   · Radiation oncology consulted with plans for palliative treatment of the right sacral metastasis.  Treatment initiated 9/28/2022  · Completed radiation to the right sacral metastasis on 10/11/2022.  · PET/CT 10/25/2022 without any metabolic uptake in the lung or the skeletal metastasis.  This is indicative of a positive response to Taxotere.  · Pain persistent sitting and walking position and not present when she is laying down.  · Patient is still very active and performance status is very poor.  Due to this reason chemotherapy will be continued to be held.  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  · Due to this reason an MRI of the brain was obtained but unfortunately this was performed without contrast.  Reviewed the MRI and no evidence of metastatic disease.  · 11/15/2022 lumbar puncture shows no evidence of malignancy in the CSF, elevated protein at 65.2, glucose normal at 58, culture no growth in 3 days  · Repeat MRI of the brain with contrast 11/21/2022 does not show any obvious abnormalities.  There is no enhancement of the CSF.  MRI of the cervical spine with no abnormalities.  · Patient unable to undergo MRI of the thoracic and lumbar spine due to mental status and unable to being in 1 position as well as with incontinence.  · 1/4/2023-mental status has improved significantly.  Patient reports decreased oral intake and nausea and vomiting.  She is also reporting abdominal discomfort.  · 1/12/2023-PET/CT with mediastinal lymphadenopathy as well as multiple liver lesions suggestive of disease progression.  · 1/23/2023-MRI of the brain with at least 9 different foci of small mets measuring up to 6 mm.  There is also areas of encephalomalacia concerning for chronic infarcts.    · Resumed Taxotere 1/31/2023.  Dose reduced to 60 mg per metered square.  · 3/27/2023-PET/CT shows significant response in the chest as well as the hepatic lesions.  · 3/28/2023-MRI of the brain-improvement in the metastatic disease to the brain with decrease in the size as well as decrease in enhancement noted.  · 4/25/2023-cycle 8 of Taxotere.  · Patient was admitted to the hospital on 4/28/2023 due to multiple new strokes.  · Scheduled for chemotherapy next week.  MRI is  · MRI of the brain 4/28/2023 shows small increase in size of the metastatic lesions in the brain by 1 to 2 mm.  · Patient continues to have extremely poor functional status.  · We will reassess next week and resume chemo if she is functionally  better.     *History of bilateral stage I breast cancer  · Patient with bilateral breast cancer, both stage I ([id number]), grade 2, ER/WI positive and HER2/melinda negative with low Ki-67.    · Patient initiated adjnuvant letrozole in May 2021.  · Letrozole discontinued as she is on Taxotere  · No changes    *Mental status changes  · Patient's mental status is back at baseline.  · Significant improvement  · Currently off all psychotropic medications.  · MRI of the brain 3/28/2023 with slight improvement in the 9 lesions noted previously  · Mental status remains at baseline  · 4/28/2023-MRI shows new strokes     *Cancer related pain  · Patient has been receiving Duragesic patch 50 mcg every 72 hours in addition to oxycodone 15 mg every 4 hours for breakthrough pain.  Duragesic dose had been escalated recently in the outpatient setting due to worsening pain  · On admission 9/23/2022, Duragesic patch increased to 75 mcg daily, added Decadron 4 mg IV every 6 hours with continuation of oxycodone 15 mg every 4 hours as needed for breakthrough pain in addition of Dilaudid 1 mg every 2 hours as needed for breakthrough pain.  · Patient with increasing side effects from narcotics, Duragesic decreased on 9/25/2022 from 75 down to 50 mcg patch every 72 hours.  · Dexamethasone dose decreased to 4 mg p.o. every 12 hours on 9/27/2022  · Initiated palliative radiation to the right sacrum on 9/28/2022  · Patient became confused after receiving Dilaudid.  Patient and family asked to discontinue Dilaudid.  · Duragesic patch dose was increased to 75 mcg/h on 9/30/2022. Subsequent decrease to 50 mcg due to confusion  · Gabapentin was discontinued due to the sedating effect.  · Oxycodone is being used for breakthrough pain.  · 10/3 transition to sustained release oxycodone 40 mg bid and fentanyl patch stopped  · Poorly controlled, pain particularly worse in sitting and standing position.  · Unsure if extends is contributing to mental status  changes as previously patient had been very sensitive to pain meds.  · Due to this reason we will discontinue the Xtampza ER and switch to OxyContin 20 mg twice daily.  · She continues to experience severe mental status changes.  · Long-acting pain medications discontinued due to mental status changes and she is currently on oxycodone 15 mg as needed.  · Reports better pain control after the epidural injection of the spine.  · Reports severe pain in her sacral area while she was moving in bed.  Currently taking OxyContin and oxycodone.  · Continue current regimen.  · If no improvement in symptoms may have to repeat scans.      *Anxiety/depression  · Currently all medications have been discontinued due to mental status changes  · Continue Xanax as needed  · Poorly controlled today due to inadequate pain control and bilateral lower extremity edema     *Insomnia  · She has been receiving Xanax as needed       *Anemia  · Hemoglobin 7.6 3/14/2023.  B12, ferritin, iron studies, and folate unremarkable.  · 1 unit of PRBC given 3/16/2023  · 3/21/2023 hemoglobin 8.4  · 3/14/2023-iron studies reviewed and iron saturation 24%, TIBC 329, ferritin 189  · Vitamin B12 borderline at 376, folic acid normal at greater than 20  · Hemoglobin decreased to 6.9 while she was in the hospital and received 1 unit of PRBC on 4/28/2023.  · Hemoglobin today 9.6.     *Peripheral neuropathy  · Patient was on gabapentin 300 mg twice daily.  · Gabapentin was discontinued on 9/30/2022.  · Neuropathy stable  · Patient noting heaviness to both lower extremities when seen on 3/21/2023, possible worsening neuropathy secondary to Taxotere?  Reevaluate further at next visit once anticoagulant changed and possibly acute DVT pain eliminated.  · Likely some worsening.  Now with bilateral lower extremity edema.  · Reassess next week prior to proceeding with chemotherapy     *Nausea vomiting and abdominal discomfort  · Continues to experience  vomiting.  · Abdominal discomfort has resolved  · PET/CT with liver metastasis.  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  · Continue Compazine as well as Zofran as needed  · Nausea and vomiting have pretty much resolved.  · Noted to have area of focal uptake on the PET/CT over the gallbladder which is suggestive of cholecystitis.    *Mouth sores  · 3/21/2023 patient reporting mouth sores or not being helped with her current Magic mouth rinse.  Discussed with Brooklynn Powell RN and current prescription does not have steroids added, requested dexamethasone to be added to new prescription.    *GI prophylaxis  · Continue omeprazole daily  · No changes  · PET/CT suggestive of esophagitis    *Deconditioning  · Much worse since recent admission to hospital.  · She has not been able to ambulate much due to significant bilateral lower extremity edema.  · Also has severe back pain.    *Hypokalemia  · Potassium normal at 3.8    *Brain metastasis  · Small increase in size of the metastatic lesions by 1 to 2 mm    *Abnormal LFTs  · LFTs normal today    *Acute left soleal deep vein thrombosis 3/21/2023  · Patient seen 3/21/2023 after having a left lower extremity Doppler due to reports of severe left lower extremity pain with redness and some swelling noted.  Preliminary results positive for acute soleal DVT.  Patient continues on Eliquis 5 mg twice daily and denies missing doses.  Case discussed with Dr. Snell and we will obtain additional lab work including beta-2 glycoprotein, lupus anticoagulant, and anticardiolipin antibody.  We will proceed with switching her Eliquis to Xarelto 15 mg twice daily x21 days then 20 mg daily thereafter.  She was given samples for the loading dose and will need a prescription at a later date for the 20 mg once daily.  · Hypercoagulability work-up particularly antiphospholipid syndrome labs have been reviewed and negative  · Xarelto discontinued due to multiple new  strokes  · Currently on Lovenox.  Patient is having significant trouble tolerating Lovenox.  · We will discontinue Lovenox and start Coumadin  · Pharmacy will be consulted    *Excessive bleeding  · She had a recent fall injuring her right forearm resulting in excessive bleeding.  · She is also on therapeutic Lovenox which is making the significantly worse.  · Patient is also complaining of trouble with Lovenox injections resulting in large bruises on her abdomen.  · We will change the treatment to Coumadin.    *Leukocytosis  · Likely reactive  · Continue to monitor  ·   *bilateral lower extremity edema  · Secondary to venous stasis  · Recommend compression and elevation of the lower extremities        RECOMMENDATIONS:    8. Consult pharmacy regarding Coumadin dosing.  Discontinue Lovenox once INR is therapeutic  9. Discussed with Dr. Patty العلي regarding need for radiation  10. Compression socks for lower extremity edema  11. She is scheduled for chemotherapy next week.  Patient reports that she is not ready to proceed with more chemotherapy.  If her functional status continues to appear poor then we may have to consider comfort measures.  12. We will also have to reassess the neuropathy again prior to proceeding with chemotherapy next week.      I spent 52 minutes caring for Holli on this date of service. This time includes time spent by me in the following activities: preparing for the visit, reviewing tests, obtaining and/or reviewing a separately obtained history, performing a medically appropriate examination and/or evaluation, counseling and educating the patient/family/caregiver, ordering medications, tests, or procedures, documenting information in the medical record, independently interpreting results and communicating that information with the patient/family/caregiver and care coordination.       Salma Snell MD

## 2023-05-17 DIAGNOSIS — C34.90 NON-SMALL CELL LUNG CANCER METASTATIC TO BONE: ICD-10-CM

## 2023-05-17 DIAGNOSIS — C79.51 NON-SMALL CELL LUNG CANCER METASTATIC TO BONE: ICD-10-CM

## 2023-05-17 RX ORDER — OXYCODONE HCL 20 MG/1
20 TABLET, FILM COATED, EXTENDED RELEASE ORAL EVERY 12 HOURS
Qty: 60 TABLET | Refills: 0 | Status: SHIPPED | OUTPATIENT
Start: 2023-05-17

## 2023-05-18 ENCOUNTER — DOCUMENTATION (OUTPATIENT)
Dept: ONCOLOGY | Facility: CLINIC | Age: 58
End: 2023-05-18
Payer: COMMERCIAL

## 2023-05-19 ENCOUNTER — ANTICOAGULATION VISIT (OUTPATIENT)
Dept: ONCOLOGY | Facility: HOSPITAL | Age: 58
End: 2023-05-19
Payer: COMMERCIAL

## 2023-05-19 ENCOUNTER — LAB (OUTPATIENT)
Dept: LAB | Facility: HOSPITAL | Age: 58
End: 2023-05-19
Payer: COMMERCIAL

## 2023-05-19 DIAGNOSIS — C50.212 MALIGNANT NEOPLASM OF UPPER-INNER QUADRANT OF LEFT BREAST IN FEMALE, ESTROGEN RECEPTOR POSITIVE: Primary | ICD-10-CM

## 2023-05-19 DIAGNOSIS — I82.402 ACUTE DEEP VEIN THROMBOSIS (DVT) OF LEFT LOWER EXTREMITY, UNSPECIFIED VEIN: Primary | ICD-10-CM

## 2023-05-19 DIAGNOSIS — Z17.0 MALIGNANT NEOPLASM OF UPPER-INNER QUADRANT OF LEFT BREAST IN FEMALE, ESTROGEN RECEPTOR POSITIVE: Primary | ICD-10-CM

## 2023-05-19 LAB
INR PPP: 2.8 (ref 0.9–1.1)
PROTHROMBIN TIME: 34.1 SECONDS (ref 11–13.5)

## 2023-05-19 PROCEDURE — G0463 HOSPITAL OUTPT CLINIC VISIT: HCPCS

## 2023-05-19 PROCEDURE — 36416 COLLJ CAPILLARY BLOOD SPEC: CPT

## 2023-05-19 PROCEDURE — 85610 PROTHROMBIN TIME: CPT

## 2023-05-19 NOTE — PROGRESS NOTES
Anticoagulation Clinic Progress Note    Patient's visit was held in office today.    Anticoagulation Summary  As of 2023    INR goal:  2.0-3.0   TTR:  43.3 % (1 d)   INR used for dosin.80 (2023)   Warfarin maintenance plan:  4 mg (4 mg x 1) every day; Starting 2023   Weekly warfarin total:  28 mg   No change documented:  Shanita Thomas RPH   Plan last modified:  Marta Ferguson RPH (2023)   Next INR check:  2023   Priority:  Critical   Target end date:  Indefinite    Indications    Acute deep vein thrombosis (DVT) of left lower extremity  unspecified vein [I82.402]             Anticoagulation Episode Summary     INR check location:      Preferred lab:      Send INR reminders to:   [facility] CBC ANTICOAG POOL    Comments:  [facility] lab/in clinic New to warf May 2023      Anticoagulation Care Providers     Provider Role Specialty Phone number    Salma Snell MD Referring Hematology and Oncology [phone]          Clinic Interview:  Patient Findings     Positives:  Signs/symptoms of bleeding, Bruising; intermittent bleeding stopped w/ D/C Lovenox injections     Negatives:  Signs/symptoms of thrombosis, Laboratory test error   suspected, Change in health, Change in alcohol use, Change in activity,   Upcoming invasive procedure, Emergency department visit, Upcoming dental   procedure, Missed doses, Extra doses, Change in medications, Change in   diet/appetite, Hospital admission, Other complaints    Comments:  Intermittent bleeding stopped w/ DC of Lovenox injections,   bruising improving. Swelling improving      Clinical Outcomes     Negatives:  Major bleeding event, Thromboembolic event,   Anticoagulation-related hospital admission, Anticoagulation-related ED   visit, Anticoagulation-related fatality    Comments:  Intermittent bleeding stopped w/ DC of Lovenox injections,   bruising improving. Swelling improving        INR History:      Latest Ref Rng & Units 2023     3:43 PM  5/12/2023     9:30 AM 5/12/2023     9:45 AM 5/16/2023     8:43 AM 5/16/2023     9:15 AM 5/19/2023     9:45 AM 5/19/2023     9:46 AM   Anticoagulation Monitoring   INR    1.40  3.80 2.80    INR Date    5/12/2023 5/16/2023 5/19/2023    INR Goal  2.0-3.0  2.0-3.0  2.0-3.0 2.0-3.0    Trend    Same  Same Same    Last Week Total  0 mg  12 mg  32 mg 28 mg    Next Week Total  24 mg  32 mg  24 mg 28 mg    Sun  -  6 mg (5/14)  - 4 mg    Mon  Hold (5/8)  4 mg  - 4 mg    Tue  4 mg  -  Hold (5/16) -    Wed  4 mg  -  4 mg -    Thu  4 mg  -  4 mg -    Fri  -  6 mg (5/12)  - 4 mg    Sat  -  4 mg  - 4 mg    Historical INR 0.90 - 1.10  1.40    3.80     2.80     Visit Report  Report   Report Report         Plan:  1. INR is Therapeutic today- see above in Anticoagulation Summary. RTC Tuesday 5/23/23  Will instruct Holli GENAO Amanda to Continue their warfarin regimen- see above in Anticoagulation Summary.  2. Follow up in 4 days  3. Patient declines warfarin refills.  4. Verbal and written information provided. Patient expresses understanding and has no further questions at this time.    Shanita Thomas [facility]

## 2023-05-22 RX ORDER — POTASSIUM CHLORIDE 750 MG/1
20 CAPSULE, EXTENDED RELEASE ORAL DAILY
Qty: 60 CAPSULE | Refills: 1 | Status: SHIPPED | OUTPATIENT
Start: 2023-05-22

## 2023-05-23 ENCOUNTER — ANTICOAGULATION VISIT (OUTPATIENT)
Dept: ONCOLOGY | Facility: HOSPITAL | Age: 58
End: 2023-05-23
Payer: COMMERCIAL

## 2023-05-23 ENCOUNTER — LAB (OUTPATIENT)
Dept: LAB | Facility: HOSPITAL | Age: 58
End: 2023-05-23
Payer: COMMERCIAL

## 2023-05-23 DIAGNOSIS — I82.4Y9 DEEP VEIN THROMBOSIS (DVT) OF PROXIMAL LOWER EXTREMITY, UNSPECIFIED CHRONICITY, UNSPECIFIED LATERALITY: Primary | ICD-10-CM

## 2023-05-23 DIAGNOSIS — I82.402 ACUTE DEEP VEIN THROMBOSIS (DVT) OF LEFT LOWER EXTREMITY, UNSPECIFIED VEIN: Primary | ICD-10-CM

## 2023-05-23 LAB
INR PPP: 3.4 (ref 0.9–1.1)
PROTHROMBIN TIME: 40.6 SECONDS (ref 11–13.5)

## 2023-05-23 PROCEDURE — 36416 COLLJ CAPILLARY BLOOD SPEC: CPT

## 2023-05-23 PROCEDURE — 85610 PROTHROMBIN TIME: CPT

## 2023-05-23 NOTE — PROGRESS NOTES
Anticoagulation Clinic Progress Note    Patient's visit was held by phone today.    Anticoagulation Summary  As of 5/23/2023    INR goal:  2.0-3.0   TTR:  35.9 % (5 d)   INR used for dosing:  3.40 (5/23/2023)   Warfarin maintenance plan:  2 mg (4 mg x 0.5) every Tue, Sat; 4 mg (4 mg x 1) all other days; Starting 5/23/2023   Weekly warfarin total:  24 mg   Plan last modified:  Annetta Mcgregor RPH (5/23/2023)   Next INR check:  5/30/2023   Priority:  Critical   Target end date:  Indefinite    Indications    Acute deep vein thrombosis (DVT) of left lower extremity  unspecified vein [I82.402]             Anticoagulation Episode Summary     INR check location:      Preferred lab:      Send INR reminders to:   [facility] CBC ANTICOAG POOL    Comments:  [facility] lab/in clinic New to warf May 2023      Anticoagulation Care Providers     Provider Role Specialty Phone number    Salma Snell MD Referring Hematology and Oncology [phone]          Drug interactions: has remained unchanged.  Diet: has remained unchanged.    Clinic Interview:  No pertinent clinical findings have been reported.    INR History:      Latest Ref Rng & Units 5/12/2023     9:45 AM 5/16/2023     8:43 AM 5/16/2023     9:15 AM 5/19/2023     9:45 AM 5/19/2023     9:46 AM 5/23/2023     9:45 AM 5/23/2023     9:50 AM   Anticoagulation Monitoring   INR  1.40  3.80 2.80  3.40    INR Date  5/12/2023 5/16/2023 5/19/2023 5/23/2023    INR Goal  2.0-3.0  2.0-3.0 2.0-3.0  2.0-3.0    Trend  Same  Same Same  Down    Last Week Total  12 mg  32 mg 28 mg  24 mg    Next Week Total  32 mg  24 mg 28 mg  24 mg    Sun  6 mg (5/14)  - 4 mg  4 mg    Mon  4 mg  - 4 mg  4 mg    Tue  -  Hold (5/16) -  2 mg    Wed  -  4 mg -  4 mg    Thu  -  4 mg -  4 mg    Fri  6 mg (5/12)  - 4 mg  4 mg    Sat  4 mg  - 4 mg  2 mg    Historical INR 0.90 - 1.10  3.80     2.80    3.40     Visit Report   Report Report           Plan:  1. INR is Supratherapeutic today- see above in  Anticoagulation Summary.   Will instruct Holli Patel to Change their warfarin regimen- see above in Anticoagulation Summary.  2. Follow up in 1 weeks/ Pt here for infusion, will check INR and find pt in onc clinic to discuss.  3.They have been instructed to call if any changes in medications, doses, concerns, etc. Patient expresses understanding and has no further questions at this time.    Becky Mcgregor, [facility]

## 2023-05-25 ENCOUNTER — TELEMEDICINE (OUTPATIENT)
Dept: ONCOLOGY | Facility: CLINIC | Age: 58
End: 2023-05-25
Payer: COMMERCIAL

## 2023-05-25 DIAGNOSIS — C34.90 NON-SMALL CELL LUNG CANCER METASTATIC TO ADRENAL GLAND: Primary | ICD-10-CM

## 2023-05-25 DIAGNOSIS — C79.70 NON-SMALL CELL LUNG CANCER METASTATIC TO ADRENAL GLAND: Primary | ICD-10-CM

## 2023-05-25 DIAGNOSIS — C34.90 NON-SMALL CELL LUNG CANCER METASTATIC TO BONE: ICD-10-CM

## 2023-05-25 DIAGNOSIS — C79.51 NON-SMALL CELL LUNG CANCER METASTATIC TO BONE: ICD-10-CM

## 2023-05-25 NOTE — PROGRESS NOTES
TREATMENT  PREPARATION    Holli Patel  [id number]  1965    Chief Complaint: Treatment preparation and needs assessment    History of present illness:  Holli Patel is a 57 y.o. year old female who is here today for treatment preparation and needs assessment.  The patient has been diagnosed with   Encounter Diagnoses   Name Primary?   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     and is scheduled to begin treatment with:     Oncology History:    Oncology/Hematology History   Primary adenocarcinoma of upper lobe of left lung (HCC)   5/18/2021 Initial Diagnosis    Primary adenocarcinoma of upper lobe of left lung (CMS/HCC)     5/18/2021 Cancer Staged    Staging form: Lung, AJCC 8th Edition  - Clinical stage from 5/18/2021: cT1, cN2, cM0 - Signed by Shell Thomson MD on 5/18/2021 5/25/2021 - 8/16/2021 Chemotherapy    OP LUNG PEMEtrexed / CISplatin + XRT     8/11/2021 - 10/6/2021 Chemotherapy    OP LUNG Durvalumab     11/22/2021 - 6/28/2022 Chemotherapy    OP LUNG Pembrolizumab 200 mg / Pemetrexed     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     Past Medical History, Past Surgical History, Social History, Family History have been reviewed and are without significant changes except as mentioned.    Physical Exam:    There were no vitals filed for this visit.  Vitals:    05/25/23 1019   PainSc:   8   PainLoc: Foot        ECOG score: 0         Physical Exam  HENT:      Head: Normocephalic and atraumatic.   Eyes:      Extraocular Movements: Extraocular movements intact.       Conjunctiva/sclera: Conjunctivae normal.   Pulmonary:      Effort: Pulmonary effort is normal. No respiratory distress.   Neurological:      General: No focal deficit present.      Mental Status: She is alert and oriented to person, place, and time.   Psychiatric:         Mood and Affect: Mood normal.         Behavior: Behavior normal.         NEEDS ASSESSMENTS    Genetics  The patient's new diagnosis and family history have been reviewed for genetic counseling needs. The patient will not be referred..     Psychosocial and Barriers to care  The patient has completed a PHQ-9 Depression Screening and the Distress Thermometer (DT) today.  PHQ-9 results show PHQ-2 Total Score: 1 PHQ-9 Total Score: PHQ-9 Total Score: 1     The patient scored their distress today as   on a scale of 0-10 with 0 being no distress and 10 being extreme distress. Problems marked by the patient as being an issue for them within the last week include   .      Results were reviewed along with psychosocial resources offered by our cancer center.  Our Supportive Oncology team will be flagged for a score of 4 or above, and a same day call will be made for a score of 9 or 10.  A mental health referral is offered at that time. Patients who score less than 4 have been educated on our support services and can be referred to our  upon request.  The patient will not be referred to our .       Nutrition  The patient has completed the malnutrition screening today. They scored Malnutrition Screening Tool  Have you recently lost weight without trying?  If yes, how much weight have you lost?: 0--> No  Have you been eating poorly because of a decreased appetite?: 0--> No  MST score: 0   with a score of 0-1 meaning not at risk in a score of 2 or greater meaning at risk.  Patients with a score of 3 or higher will be referred to our oncology dietitian for support. Patients beginning at risk treatment regimens or who have dietary concerns  will also be referred to our oncology dietitian. The patient will not be referred.    Functional Assessment  Persons who are age 70 or greater will be screened for qualification of a comprehensive geriatric assessment by our survivorship nurse practitioner.  Older adults with cancer face unique challenges. These may include an increased risk of drug reactions, financial burdens, and caregiver stress. The patient scored   . Patients scoring 14 or lower will referred for an older adult functional assessment with the survivorship advanced practice registered nurse to ensure all needed support is provided as patients plan for their treatments. NOT APPLICABLE    Intravenous Access Assessment  The patient and I discussed planned intravenous chemo/biotherapy as well as other IV treatments that are often needed throughout the course of treatment. These may include, but are not limited to blood transfusions, antibiotics, and IV hydration. Discussed that depending on selected treatment and vein assessment, patient may require venous access device (VAD) which could include but not limited to a Mediport or PICC line. Risks and benefits of VADs reviewed. The patient will be treated via Port.    Reproductive/Sexual Activity   People should avoid becoming pregnant and should not get a partner pregnant while undergoing chemo/biotherapy.  People of childbearing age should use effective contraception during active therapy. The best recommendation for all people is to use a barrier method for a minimum of 1 week after the last infusion of chemo/biotherapy to prevent your partner being exposed to byproducts from treatment medications in bodily fluids. Effective contraception should be discussed with your oncology team to make sure it is safe to take based on your diagnosis. Possible options include oral contraceptives, barrier methods. Chemo/biotherapy can change your ability to reproduce children in the future.  There are options for  "fertility preservation. NOT APPLICABLE    Advanced Care Planning  Advance Care Planning   The patient and I discussed advanced care planning, \"Conversations that Matter\".   This service is offered for development of advance directives with a certified ACP facilitator.  The patient does have an up-to-date advanced directive. This document is on file with our office.     Have you reviewed your Advance Directive and is it valid for this stay?: Yes  Patient Requests Assistance on Advance Directives: Patient Declined          Smoking cessation  Tobacco Use: Medium Risk   • Smoking Tobacco Use: Former   • Smokeless Tobacco Use: Never   • Passive Exposure: Not on file       Patient and I discussed their tobacco use history. Referral will not be made for smoking cessation.      Palliative Care  When appropriate, the patient and I discussed the availability palliative care services and when appropriate Hospice care. Palliative care is not the same as Hospice care which was explained to the patient.NOT APPLICABLE.    Survivorship   When appropriate, we discussed that we will refer the patient to survivorship clinic to discuss next steps following completion of planned treatment.  Reviewed this visit will include assessment of your physical, psychological, functional, and spiritual needs as a survivor and the need at attend this visit when scheduled.    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  The patient was given explanation of treatment premed side effects including office policy that prohibits patients to drive if sedating medications are administered, MD explanation given regarding benefits, side effects, toxicities and goals of treatment.  The patient received a Chemotherapy/Biotherapy Plan Summary including diagnosis and explanation of " specific treatment plan.    SIDE EFFECTS:  Common side effects were discussed with the patient and/or significant other.  Discussion included where applicable hair loss/discoloration, anemia/fatigue, infection/chills/fever, appetite, bleeding risk/precautions, constipation, diarrhea, mouth sores, taste alteration, loss of appetite, nausea/vomiting, peripheral neuropathy, skin/nail changes, rash, muscle aches/weakness, photosensitivity, weight gain/loss, hearing loss, dizziness, menopausal symptoms, menstrual irregularity, sterility, high blood pressure, heart damage, liver damage, lung damage, kidney damage, DVT/PE risk, fluid retention, pleural/pericardial effusion, somnolence, electrolyte/LFT imbalance, vein exercises and/or the possible need for vascular access/port placement.  The patient was advised that although uncommon, leakage of an infused medication from the vein or venous access device may lead to skin breakdown and/or other tissue damage.  The patient was advised that he/she may have pain, bleeding, and/or bruising from the insertion of a needle in their vein or venous access device (port).  The patient was further advised that, in spite of proper technique, infection with redness and irritation may rarely occur at the site where the needle was inserted.  The patient was advised that if complications occur, additional medical treatment is available.  Finally, where applicable we have reviewed rare but potential immune mediated side effects including shortness of breath, cough, chest pain (pneumonitis), abdominal pain, diarrhea (colitis), thyroiditis (hypothyroid or hyperthyroid), hepatitis and liver dysfunction, nephritis and renal dysfunction.    Discussion also included side effects specific to drugs in the treatment plan, specifically:    Treatment Plans     Name Type Hold Status Plan Dates Plan Provider       Active    OP SUPPORTIVE Denosumab (Xgeva) Q28D ONCOLOGY SUPPORTIVE CARE 1 On Automatic Hold   7/26/2022 - Present Salma Snell MD    OP LUNG Gemcitabine Q28D ONCOLOGY TREATMENT Not on Hold  5/22/2023 - Present Salma Snell MD                   Questions answered and additional information discussed on topics including:  Anemia, Thrombocytopenia, Neutropenia, Nutrition and appetite changes, Constipation, Diarrhea, Nausea & vomiting, Mouth sores, Organ toxicities and Home care       Assessment and Plan:    Diagnoses and all orders for this visit:    1. 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      No orders of the defined types were placed in this encounter.        1. The patient and I have reviewed their diagnosis and scheduled treatment plan. Needs assessment was completed where applicable including genetics, psychosocial needs, barriers to care, VAD evaluation, advanced care planning, survivorship, and palliative care services where indicated. Referrals have been ordered as appropriate based upon evaluation today and patient desires.   2. Chemo/biotherapy teaching was completed today and consent obtained. See separate documentation for further details.  3. Adequate time was given to answer questions.  Patient made aware of their care team members and contact information if they have questions or problems throughout the treatment course.  4. Discussion held and written information provided describing frequency of office visits and ongoing monitoring throughout the treatment plan.     5. Reviewed with patient any prescribed medication sent to pharmacy.  Education provided regarding proper storage, safe handling, and proper disposal of unused medication.  6. Proper handling of body fluids and waste discussed and written information provided.  7. If appropriate, patient had pretreatment labs drawn today.    Learning assessment completed at initial patient encounter. See separate flowsheet. Chemo/biotherapy education  comprehension assessed at today's visit.    I spent 35 minutes caring for Holli on this date of service. This time includes time spent by me in the following activities: preparing for the visit, reviewing tests, obtaining and/or reviewing a separately obtained history, documenting information in the medical record and care coordination.     You have chosen to receive care through a telehealth visit.  Do you consent to use a video/audio connection for your medical care today? Yes    This visit has been rescheduled as a phone visit due to techical difficulties.         Marly Calhoun, APRN   05/25/23

## 2023-05-25 NOTE — PROGRESS NOTES
[facility] Hematology/Oncology Treatment Plan Summary    Name: Holli Patel  [facility]# [id number]  MD: Dr. Snell    Diagnosis:     ICD-10-CM ICD-9-CM   1. 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  C34.90 162.9    C79.51 198.5     Stage: IV    Goal of treatment: palliative    Treatment Medication(s):   1. Gemzar    Frequency: once weekly for 3 out of 4 weeks    Number of cycles: to be determined    Starting on: 5/30/2023    Repeat after 2 cycles: CT Scan    Items for home use: Senokot-S (for constipation), Milk of Magnesia (for constipation), Imodium AD (for diarrhea), Tylenol (for fever and/or pain) and Thermometer      Completing Provider: KORI Marley           Date/time: 05/25/2023      Please note: You will be seen by a provider frequently with your treatment plan. This plan may change depending on many factors, if so, this will be discussed with you by your physician.  Last update 03/2022.

## 2023-05-30 ENCOUNTER — INFUSION (OUTPATIENT)
Dept: ONCOLOGY | Facility: HOSPITAL | Age: 58
End: 2023-05-30

## 2023-05-30 ENCOUNTER — LAB (OUTPATIENT)
Dept: LAB | Facility: HOSPITAL | Age: 58
End: 2023-05-30

## 2023-05-30 ENCOUNTER — ANTICOAGULATION VISIT (OUTPATIENT)
Dept: ONCOLOGY | Facility: HOSPITAL | Age: 58
End: 2023-05-30

## 2023-05-30 ENCOUNTER — OFFICE VISIT (OUTPATIENT)
Dept: ONCOLOGY | Facility: CLINIC | Age: 58
End: 2023-05-30

## 2023-05-30 VITALS
WEIGHT: 163.6 LBS | HEIGHT: 63 IN | RESPIRATION RATE: 18 BRPM | TEMPERATURE: 96.9 F | DIASTOLIC BLOOD PRESSURE: 91 MMHG | BODY MASS INDEX: 28.99 KG/M2 | HEART RATE: 104 BPM | OXYGEN SATURATION: 98 % | SYSTOLIC BLOOD PRESSURE: 130 MMHG

## 2023-05-30 DIAGNOSIS — C34.90 NON-SMALL CELL LUNG CANCER METASTATIC TO BONE: Primary | ICD-10-CM

## 2023-05-30 DIAGNOSIS — C79.51 NON-SMALL CELL LUNG CANCER METASTATIC TO BONE: ICD-10-CM

## 2023-05-30 DIAGNOSIS — C34.90 NON-SMALL CELL LUNG CANCER METASTATIC TO BONE: ICD-10-CM

## 2023-05-30 DIAGNOSIS — C79.51 NON-SMALL CELL LUNG CANCER METASTATIC TO BONE: Primary | ICD-10-CM

## 2023-05-30 DIAGNOSIS — I82.402 ACUTE DEEP VEIN THROMBOSIS (DVT) OF LEFT LOWER EXTREMITY, UNSPECIFIED VEIN: Primary | ICD-10-CM

## 2023-05-30 LAB
ALBUMIN SERPL-MCNC: 3.7 G/DL (ref 3.5–5.2)
ALBUMIN/GLOB SERPL: 1.7 G/DL (ref 1.1–2.4)
ALP SERPL-CCNC: 99 U/L (ref 38–116)
ALT SERPL W P-5'-P-CCNC: 20 U/L (ref 0–33)
ANION GAP SERPL CALCULATED.3IONS-SCNC: 12.6 MMOL/L (ref 5–15)
AST SERPL-CCNC: 15 U/L (ref 0–32)
BASOPHILS # BLD AUTO: 0.03 10*3/MM3 (ref 0–0.2)
BASOPHILS NFR BLD AUTO: 0.2 % (ref 0–1.5)
BILIRUB SERPL-MCNC: 0.2 MG/DL (ref 0.2–1.2)
BUN SERPL-MCNC: 27 MG/DL (ref 6–20)
BUN/CREAT SERPL: 28.1 (ref 7.3–30)
CALCIUM SPEC-SCNC: 8.9 MG/DL (ref 8.5–10.2)
CHLORIDE SERPL-SCNC: 103 MMOL/L (ref 98–107)
CO2 SERPL-SCNC: 25.4 MMOL/L (ref 22–29)
CREAT SERPL-MCNC: 0.96 MG/DL (ref 0.6–1.1)
DEPRECATED RDW RBC AUTO: 68.6 FL (ref 37–54)
EGFRCR SERPLBLD CKD-EPI 2021: 68.3 ML/MIN/1.73
EOSINOPHIL # BLD AUTO: 0.09 10*3/MM3 (ref 0–0.4)
EOSINOPHIL NFR BLD AUTO: 0.7 % (ref 0.3–6.2)
ERYTHROCYTE [DISTWIDTH] IN BLOOD BY AUTOMATED COUNT: 18.8 % (ref 12.3–15.4)
GLOBULIN UR ELPH-MCNC: 2.2 GM/DL (ref 1.8–3.5)
GLUCOSE SERPL-MCNC: 108 MG/DL (ref 74–124)
HCT VFR BLD AUTO: 30.3 % (ref 34–46.6)
HGB BLD-MCNC: 8.8 G/DL (ref 12–15.9)
IMM GRANULOCYTES # BLD AUTO: 0.14 10*3/MM3 (ref 0–0.05)
IMM GRANULOCYTES NFR BLD AUTO: 1 % (ref 0–0.5)
INR PPP: 3.7 (ref 0.9–1.1)
LYMPHOCYTES # BLD AUTO: 0.22 10*3/MM3 (ref 0.7–3.1)
LYMPHOCYTES NFR BLD AUTO: 1.6 % (ref 19.6–45.3)
MCH RBC QN AUTO: 29 PG (ref 26.6–33)
MCHC RBC AUTO-ENTMCNC: 29 G/DL (ref 31.5–35.7)
MCV RBC AUTO: 100 FL (ref 79–97)
MONOCYTES # BLD AUTO: 0.63 10*3/MM3 (ref 0.1–0.9)
MONOCYTES NFR BLD AUTO: 4.7 % (ref 5–12)
NEUTROPHILS NFR BLD AUTO: 12.3 10*3/MM3 (ref 1.7–7)
NEUTROPHILS NFR BLD AUTO: 91.8 % (ref 42.7–76)
NRBC BLD AUTO-RTO: 0 /100 WBC (ref 0–0.2)
PLATELET # BLD AUTO: 184 10*3/MM3 (ref 140–450)
PMV BLD AUTO: 8.8 FL (ref 6–12)
POTASSIUM SERPL-SCNC: 4 MMOL/L (ref 3.5–4.7)
PROT SERPL-MCNC: 5.9 G/DL (ref 6.3–8)
PROTHROMBIN TIME: 43.9 SECONDS (ref 11–13.5)
RBC # BLD AUTO: 3.03 10*6/MM3 (ref 3.77–5.28)
SODIUM SERPL-SCNC: 141 MMOL/L (ref 134–145)
WBC NRBC COR # BLD: 13.41 10*3/MM3 (ref 3.4–10.8)

## 2023-05-30 PROCEDURE — 25010000002 GEMCITABINE 2 GM/52.6ML SOLUTION 52.6 ML VIAL: Performed by: INTERNAL MEDICINE

## 2023-05-30 PROCEDURE — 96375 TX/PRO/DX INJ NEW DRUG ADDON: CPT

## 2023-05-30 PROCEDURE — 80053 COMPREHEN METABOLIC PANEL: CPT

## 2023-05-30 PROCEDURE — 96413 CHEMO IV INFUSION 1 HR: CPT

## 2023-05-30 PROCEDURE — 85025 COMPLETE CBC W/AUTO DIFF WBC: CPT

## 2023-05-30 PROCEDURE — 25010000002 DEXAMETHASONE SODIUM PHOSPHATE 100 MG/10ML SOLUTION: Performed by: INTERNAL MEDICINE

## 2023-05-30 PROCEDURE — 36415 COLL VENOUS BLD VENIPUNCTURE: CPT

## 2023-05-30 PROCEDURE — 85610 PROTHROMBIN TIME: CPT | Performed by: INTERNAL MEDICINE

## 2023-05-30 RX ORDER — ATORVASTATIN CALCIUM 80 MG/1
80 TABLET, FILM COATED ORAL NIGHTLY
Qty: 30 TABLET | Refills: 5 | Status: SHIPPED | OUTPATIENT
Start: 2023-05-30

## 2023-05-30 RX ORDER — SODIUM CHLORIDE 9 MG/ML
250 INJECTION, SOLUTION INTRAVENOUS ONCE
Status: COMPLETED | OUTPATIENT
Start: 2023-05-30 | End: 2023-05-30

## 2023-05-30 RX ORDER — SODIUM CHLORIDE 9 MG/ML
250 INJECTION, SOLUTION INTRAVENOUS ONCE
OUTPATIENT
Start: 2023-06-06

## 2023-05-30 RX ORDER — FUROSEMIDE 20 MG/1
20 TABLET ORAL DAILY
Qty: 30 TABLET | Refills: 3 | Status: SHIPPED | OUTPATIENT
Start: 2023-05-30

## 2023-05-30 RX ORDER — SODIUM CHLORIDE 9 MG/ML
250 INJECTION, SOLUTION INTRAVENOUS ONCE
OUTPATIENT
Start: 2023-06-13

## 2023-05-30 RX ORDER — SODIUM CHLORIDE 9 MG/ML
250 INJECTION, SOLUTION INTRAVENOUS ONCE
Status: CANCELLED | OUTPATIENT
Start: 2023-05-30

## 2023-05-30 RX ADMIN — SODIUM CHLORIDE 250 ML: 9 INJECTION, SOLUTION INTRAVENOUS at 12:35

## 2023-05-30 RX ADMIN — DEXAMETHASONE SODIUM PHOSPHATE 12 MG: 10 INJECTION, SOLUTION INTRAMUSCULAR; INTRAVENOUS at 12:35

## 2023-05-30 RX ADMIN — SODIUM CHLORIDE 1700 MG: 900 INJECTION, SOLUTION INTRAVENOUS at 12:53

## 2023-05-30 NOTE — PROGRESS NOTES
[facility] OUTPATIENT PROGRESS NOTE        CHIEF COMPLAINT/REASON FOR VISIT:  Metastatic non-small cell lung cancer  Bilateral breast cancer    Interval history  Marcelle presents to the clinic today for follow-up.  She was started on gabapentin 100 mg nightly to help with the neuropathic pain that she has been complaining of.  Her  Jelani feels like she is not really in a lot of pain however she believes that she is and she is getting confused on the gabapentin.  She has also been using topical gabapentin to help with the back pain.  She continues on the Xanax and the oxycodone as needed.  She the lower extremity edema has worsened in spite of keeping her feet elevated.  We discussed obtaining a Doppler today to evaluate for DVT however they would want to wait and try Lasix prior to proceeding with the same.  She is here to start the first week of Gemzar.    Oncologic history  Ms. 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     3/1/2021-bilateral screening mammogram-microcalcifications seen in the posterior one third of the lateral aspect of the right breast.  Area of focal asymmetry seen in the middle one third of the upper inner quadrant of the left breast.     3/1/2021-DEXA scan-T score of -2.2 on the lumbar spine and T score of -2.3 in the left hip and T score of -1.9 in the right hip.  Findings consistent with osteopenia     3/23/2021-screening lung CT-there is a 10 x 11 mm solid nodule in the left upper lobe.  Enlarged AP window lymph node measuring 14 x 10 mm.  Suggestion of a possible 9 mm left hilar lymph node.  Heavy coronary artery calcification.     3/23/2021-diagnostic mammogram bilateral-cluster of microcalcifications in the middle third lateral aspect of the right breast.  Left breast demonstrates persistence of the area of focal asymmetry in the region.     Ultrasound-left breast ultrasound at 10  o'clock position, 6 cm from the nipple there is a 0.4 cm irregular hypoechoic lesion.  Stereotactic biopsy of the right breast calcifications recommended.  Ultrasound-guided biopsy of the left breast lesion recommended     4/7/2021-right breast stereotactic biopsy and left breast ultrasound-guided biopsy  Pathology  Right breast-invasive ductal carcinoma, grade 2, lymphovascular invasion present, ER +99% strong, WV +80% moderate, HER-2 negative, Ki-67 12%  Left breast upper inner quadrant 10 o'clock position biopsy, invasive ductal carcinoma, grade   2, ER +99% strong, WV +40% strong, HER-2 2+ on immunohistochemistry, nonamplified on FISH Ki-67 10%     4/14/2021-PET/CT-FDG avid aortopulmonary window lymph node measures 0.9 cm with an SUV of 7.5.  FDG avid left hilar lymph node measures 1 cm with an SUV of 17.2.  Irregular soft tissue density in the right breast measuring 3.7 x 3.8 cm is favored to be secondary to the recent breast biopsy.  1.1 cm pulmonary nodule within the left upper lobe with SUV 9.7 .  Sub-6 mm pulmonary nodules are present in the right lower lobe.  No evidence of lymphadenopathy or metastatic disease in the abdomen.  Focal area of FDG uptake within the central canal posterior to T11-T12 displaced demonstrating an SUV of 5.5 thought to be reactive however MRI is recommended for further evaluation.     She was seen by Dr. Molina who initially planned for breast surgery however  due to the PET abnormalities she has been referred to Dr. Kerr who plans to do a wound on 4/30/2021.  Dr. Moilna's and Dr. Kerr's notes reviewed.     Patient denies any family history of breast cancer.  Her mother had lymphoma.  Maternal grandmother had colon cancer.  Prior to that screening mammogram she did not have any palpable abnormalities of either breast.     She has been a heavy smoker for about 40 years and smoked 2 packs/day.  Denies any recent weight changes, new bone pains, cough, abdominal pain nausea  vomiting constipation or diarrhea.  She does have anxiety and has been on several medications.  She has recently been started on Xanax to help with the mood and also with insomnia.     Patient had a video-assisted thoracoscopy and mediastinal lymphadenectomy on 4/30/2021.  L5-L6 lymph nodes were biopsied however the small pulmonary nodule in the left upper lobe was not difficult to find.  Pathology showed moderately differentiated adenocarcinoma which is CK7 and TTF-1 positive.  Consistent with lung primary.  Ki-67 85%.     5/14/2021-MRI of the brain-minimal chronic small vessel ischemic change in the white matter.  Otherwise negative MRI.     5/20/2021-bilateral axillary ultrasound-no evidence of axillary lymphadenopathy in either axilla.  Normal-appearing bilateral axillary lymph nodes visualized.     Cycle 1 cisplatin and Alimta on 5/25/2021.     Cycle 2 cisplatin Alimta 6/15/2021     Cycle 3 cisplatin Alimta 7/7/2021     Completed radiation on 7/12/2021     Port was nonfunctional hence a port study was performed which showed that the port was backed up into the innominate vein and also there was a nonocclusive thrombus at the end of the catheter extending into the superior vena cava.  She was started on anticoagulation with Eliquis.  It was recommended for port revision of the intention to keep the port.     PET/CT 8/5/2021-images independently reviewed and interpreted by me-decrease in size and FDG uptake of the left upper lobe pulmonary nodule as well as mediastinal and left hilar lymphadenopathy representing response to treatment.  Sub-6 mm pulmonary nodule in the left upper lobe new since 4/14/2021.  This could be related to radiation however follow-up CT in 3 months recommended.  Decrease in size of the irregular masslike tissue in the right breast which is postbiopsy hematoma.  This is not PET avid.  New segmental left eighth rib fractures healing.  A new band of sclerosis of the left seventh rib which  favored to represent healing nondisplaced fracture.  Follow-up CT recommended.  focal uptake in the duodenum first and second portions.  Could be related to duodenitis.  Indeterminate lesion in the L1 vertebral body not well evaluated on PET/CT.     10/1/2021-MRI of the lumbar spine.  Lesion in the left posterior body of L1 measures 14 x 14 x 12 mm and previously 5 x 5 x 6 mm.  The interval enlargement strongly suggest that it is metastatic lesion.  No additional osseous metastasis noted.   Other chronic changes noted.     10/14/2021-biopsy of the lumbar spine lesion-pathology consistent with poorly differentiated carcinoma.  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     10/26/2021-brain MRI-no evidence of metastatic disease.     10/26/2021-bilateral diagnostic mammogram and ultrasound  Scattered fibroglandular density in both breast.  Postbiopsy hematoma with internal biopsy clip in the upper outer quadrant of the right breast, 8 cm from the nipple.  The hematoma size is decreased to 2.9 cm from 3.6 cm earlier.     Left breast-parenchymal density measuring 9 x 10 mm has markedly decreased.  Few adjacent calcifications which are unchanged.  No new abnormality in the left breast.  At 10:00 region there is some minimal adjacent hypoechoic texture which is difficult to measure but appears smaller since the previous exam.     10/28/2021-PET/CT  New L1 and L2 lytic bone metastasis annually intensely hypermetabolic focus at the left adrenal gland likely represents metastatic disease as well.  Slight decrease in size of the 0.9 x 0.7 cm left upper lobe pulmonary nodule.  There is hypermetabolic activity related to radiation pneumonitis.  The remainder of the nodule is photopenic.  Uncertain etiology of the more intense activity along the right lateral peripheral margin of the 2.6 cm postbiopsy density of the right breast.     She completed  radiation to to the lumbar spine on 11/19/2021 11/22/2021-cycle 1 Alimta and Keytruda     3/23/2022-bilateral diagnostic mammogram and ultrasound  Complete resolution of the microcalcifications in the upper outer quadrant of the right breast and decrease in size of the postbiopsy hematoma.  No suspicious abnormalities in the right breast.  Benign-appearing calcifications at the site of malignancy in the left breast upper inner quadrant.  No other suspicious findings.     4/11/2022 PET/CT-there is new groundglass opacities in the left upper lobe which are most likely postradiation changes.  Other groundglass opacities in the left lung as well as the right lung remained stable.  Increased nodularity of the left adrenal gland with an SUV of 5.6, previously 3.5.    Increased uptake in the L1 vertebra in the right posterior aspect with an SUV of 3.6.  Left L1 sclerosis increased     Due to this the case was discussed in multidisciplinary conference.  The disease progression was significant in the left adrenal gland as well as the L1 vertebra.  Since there were only 2 areas of disease progression it has been decided to proceed with radiation to these 2 spots and continue on Keytruda and Alimta.     Patient also has had worsening of her kidney function.  We initially held treatment as of 5/31/2022 and creatinine bumped up to 2.0, BUN 23.  We then resumed therapy 6/11/2022 with Keytruda alone.  Patient was referred to nephrology.     6/6/2022-MRI of the spine-diffuse marrow replacement in the L1 vertebral body and inferior endplate concavity.  Suspicious for metastatic disease with pathological fracture in the inferior endplate.  Also diffuse signal abnormality in the L2 lamina on the left suspicious for additional metastatic disease.  Abnormality of the first sacral segment suspicious for metastatic disease.  Left adrenal gland appears enlarged.  30 to 40% height loss and L2 vertebral body suggestive of a subacute  compression fracture.  Degenerative changes.     7/18/2022-PET/CT  Multiple hypermetabolic osseous lesions with index lesions which have either increased in degree of FDG uptake or newly hypermetabolic lesions representative of metastatic disease and progression of disease.  Possible FDG avid lesion in the left femoral diaphysis however these are incompletely visualized and in the area of artifactual FDG uptake.  MRI of the left femur recommended for further evaluation.  Increasing FDG uptake of the left adrenal gland.  Focal left hilar hypermetabolic him corresponds to the lymph node which has minimally increased in size compared to April 2022.  New sub-6 mm pulmonary nodules.     7/26/2022-cycle 1 of Taxotere     Patient was admitted to the hospital 8/3/2022 discharged on 8/9/2022.  She was admitted for strokelike symptoms and confusion.  The confusion was thought to be secondary to polypharmacy and probably febrile neutropenia.  She was treated with antibiotics.  Mental status improved subsequently.  MRI of the cervical thoracic and lumbar spine was performed on 8/4/2022.  Images independently reviewed by me.  Multiple metastatic lesions in the spine noted.  There was a lesion on the CT which was concerning.  There is also a sacral Lesion measuring up to 5 cm in transverse dimension on the right.  Patient was symptomatic with pain on the right side of the sacrum.  Radiation oncology recommended radiation to the cervical spine as well as sacrum.  CT head without any obvious abnormalities.     Completed radiation to the cervical spine and sacrum on 8/12/2022    She was admitted to the hospital between 9/23/2022 to 10/12/2022.  She was admitted for poorly controlled pain as well as encephalopathy and multiple falls.  She was diagnosed with a UTI and treated with IV Rocephin.  Pain medications changed and also additional radiation to the right sacrum was performed.  3000 cGy in 10 fractions was administered.  She was  discharged on oxycodone 15 mg every 4 hours as needed for pain and OxyContin twice daily.  She was recommended to go to a subacute rehab however she preferred to be discharged home with home physical therapy and Occupational Therapy    9/24/2022-MRI of the pelvis showed osseous metastatic disease in the sacrum worst within the right and probable pathological fractures bilaterally.    10/4/2022 CT head without any evidence of metastatic disease or acute abnormalities.    10/25/2022-PET/CT-resolution of hypermetabolic activity in the upper lobe and left hilum.  Previously noted new tiny pulmonary nodules have resolved.  Resolution of hypermetabolic activity in the left adrenal gland.  Near complete resolution of hypermetabolic activity in the skeletal metastasis particularly the cervical spine, pelvic bones and proximal left femur.  Maximal SUV at the right sacral metastasis is 2.7 and previously it was 8.9.  Tiny focus of hypermetabolic activity at the GE junction with a maximal SUV of 4.7.  No definite thickening or abnormality noted on the CT.    11/15/2022-lumbar puncture with negative cytology of the CSF.    11/21/2022-MRI of the brain with contrast and MRI of the cervical spine with contrast without any evidence of metastatic disease.    1/12/2023-PET/CT  Hypermetabolic mediastinal lymphadenopathy and multiple liver lesions which are hypermetabolic suggestive of metastasis.  New tiny left pleural effusion is noted.  Progression of bilateral sacral fractures and right L5 transverse process fracture.  There is also an acute/subacute L1 fracture.  Lumbar spine x-rays have been recommended for evaluation of the vertebral height or lumbar MRI to be compared to the priors.    1/23/2023-MRI of the brain  At least 9 separate tiny 2 to 6 mm enhancing lesions in the brain compatible with multiple new brain metastasis.  There has been interval development of 3 separate foci of encephalomalacia concerning for chronic  "infarcts but these are new since August 2022.  Stable osseous metastasis at C2.    3/27/2023-PET/CT  Significant interval improvement of the previously seen hypermetabolic mediastinal lymphadenopathy as well as hepatic lesions.  Focal area of uptake noted in the gallbladder fundus raising concern for cholecystitis.  0.6 cm pulmonary nodule in the left upper lobe is new and follow-up CT in 3 months recommended.  New bilateral groundglass opacities in lower lobes suggestive of atypical pneumonia.  Long segment mild to moderate uptake within the esophagus suggestive of esophagitis.    3/28/2023-MRI of the brain  There are at least 9 separate 2 to 6 mm enhancing lesions in the superficial cortex of the brain which have shown a slight interval decrease in size and also decreased enhancement noted.  No new enhancing lesions noted.  Concern for focal areas of leptomeningeal seeding.  Unchanged from previous MRI tiny old lacunar infarcts in the left MCA territory.     MRI of the brain 4/28/2023-multiple new strokes in the MCA territory.  1 to 2 mm increase in size of the previously known metastatic lesions    Admitted to the hospital and evaluated by neurology and treatment has been changed to Lovenox.  2D echocardiogram without any evidence of blood clots.    Vitals:    05/30/23 1133   BP: 130/91   Pulse: 104   Resp: 18   Temp: 96.9 °F (36.1 °C)   TempSrc: Temporal   SpO2: 98%   Weight: 74.2 kg (163 lb 9.6 oz)   Height: 160 cm (62.99\")   PainSc:   4   PainLoc: Foot         PHYSICAL EXAMINATION:    General: Oriented to person, place, and time.  Ill-appearing.  Pale.  HEENT: EOMI.    Chest/Lungs: Clear to auscultation bilaterally. Right chest mediport in place  Heart: RRR, sinus tachycardia  Extremities: Bilateral lower extremity edema  Extensive bruising noted on the upper extremities.        DIAGNOSTIC DATA:  Results Review:        Results from last 7 days   Lab Units 05/30/23  1124   WBC 10*3/mm3 13.41*   HEMOGLOBIN g/dL " 8.8*   HEMATOCRIT % 30.3*   PLATELETS 10*3/mm3 184     Lab Results   Component Value Date    NEUTROABS 12.30 (H) 05/30/2023         Results from last 7 days   Lab Units 05/30/23  1120   INR  3.70*             IMAGING:    Duplex Venous Lower Extremity - Left CAR (03/21/2023 10:25)    PET/CT 1/12/2023 and MRI of the brain 1/23/2023-images independently reviewed and interpreted by me.    ASSESSMENT:  This is a 57 y.o. female with:     *Metastatic non-small cell lung cancer  · She was initially diagnosed with stage III (T1N2M0) disease.    · She received initial therapy with cisplatin and Alimta concurrent with radiation therapy beginning 5/25/2021.    · There were indeterminate findings in the lumbar spine at L1 and it was recommended to monitor this over time.    · Patient completed 3 cycles cisplatin and Alimta 7/7/2021 and completed radiation therapy 7/12/2021.    · PET scan 8/6/2021 showed good response to treatment.    · MRI lumbar spine 10/1/2021 with increase in size of the L1 lesion.    · Biopsy of the L1 lesion on 10/14/2022 confirmed metastatic adenocarcinoma of lung origin, PD-L1 TPS 0.    · PET scan 10/26/2021 with involvement of left adrenal and L1/L2 only.    · Patient received radiation therapy to L1/L2 on 11/19/2021.  She also received radiation to the left adrenal gland.    · She initiated further systemic therapy on 11/22/2021 with Keytruda and Alimta.   · Follow-up PET scan 1/18/2022 with decrease in small left upper lobe pulmonary nodule, resolution of activity at L1/L2, no new sites of disease.    · Guardant 360 CT DNA level was monitored and was decreasing.    · PET scan 4/11/2022 with progression in left adrenal and L1.    · Maintained systemic therapy with Keytruda and Alimta and received additional radiation to left adrenal and L1.  Alimta however held since 5/10/2022 due to renal dysfunction.  Radiation completed to lumbar spine 7/14/2022.    · PET scan 7/18/2022 with multiple areas of  progression of the spine and right sacrum.    · Guardant 360 analysis 7/20/2022 with no actionable mutations.    · Change in therapy to single agent palliative Taxotere initiated 7/26/2022.    · Altered mental status and febrile neutropenia requiring hospitalization 8/2 - 8/9/2022.  During that admission, MRI brain, cervical, thoracic, lumbar spine performed 8/4/2022 in addition to CT cervical spine 8/5/2022.  There was evidence of C2 metastatic lesion with some dural enhancement, compression deformity at T7 with mild edema suggesting acute to subacute fracture, 5 mm T12 spinous process metastasis, multifocal disease in the lumbar spine and sacrum, largest involving sacral ala to the right 5 cm in transverse dimension.  Loss of height at L2 25%.  · Patient received cycle 3 Taxotere 9/6/2022 with Neulasta support.    · She has been continuing as well on Xgeva every 4 weeks (last received 9/6/2022).  · Patient did undergo MRI lumbar spine and pelvis on 9/24/2022.  MRI pelvis showed bilateral sacral fractures with marrow infiltration related to metastasis more prominent on the right.  Lesion on the right 3.5 x 4.1 x 4.3 cm.  Also probable metastasis along posterior acetabulum on the right 1.5 cm.  MRI lumbar spine with stable L1 metastasis, new edema endplate T12 possibly stress reaction versus developing new metastasis, lesion at T12 spinous process unchanged.   · Radiation oncology consulted with plans for palliative treatment of the right sacral metastasis.  Treatment initiated 9/28/2022  · Completed radiation to the right sacral metastasis on 10/11/2022.  · PET/CT 10/25/2022 without any metabolic uptake in the lung or the skeletal metastasis.  This is indicative of a positive response to Taxotere.  · Pain persistent sitting and walking position and not present when she is laying down.  · Patient is still very active and performance status is very poor.  Due to this reason chemotherapy will be continued to be  held.  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  · Due to this reason an MRI of the brain was obtained but unfortunately this was performed without contrast.  Reviewed the MRI and no evidence of metastatic disease.  · 11/15/2022 lumbar puncture shows no evidence of malignancy in the CSF, elevated protein at 65.2, glucose normal at 58, culture no growth in 3 days  · Repeat MRI of the brain with contrast 11/21/2022 does not show any obvious abnormalities.  There is no enhancement of the CSF.  MRI of the cervical spine with no abnormalities.  · Patient unable to undergo MRI of the thoracic and lumbar spine due to mental status and unable to being in 1 position as well as with incontinence.  · 1/4/2023-mental status has improved significantly.  Patient reports decreased oral intake and nausea and vomiting.  She is also reporting abdominal discomfort.  · 1/12/2023-PET/CT with mediastinal lymphadenopathy as well as multiple liver lesions suggestive of disease progression.  · 1/23/2023-MRI of the brain with at least 9 different foci of small mets measuring up to 6 mm.  There is also areas of encephalomalacia concerning for chronic infarcts.    · Resumed Taxotere 1/31/2023.  Dose reduced to 60 mg per metered square.  · 3/27/2023-PET/CT shows significant response in the chest as well as the hepatic lesions.  · 3/28/2023-MRI of the brain-improvement in the metastatic disease to the brain with decrease in the size as well as decrease in enhancement noted.  · 4/25/2023-cycle 8 of Taxotere.  · Patient was admitted to the hospital on 4/28/2023 due to multiple new strokes.  · Scheduled for chemotherapy next week.  MRI is  · MRI of the brain 4/28/2023 shows small increase in size of the metastatic lesions in the brain by 1 to 2 mm.  · Remains with extremely poor functional status.  She does not have a good quality of life.  · She is requesting that her  pain be adequately controlled.  Jelani her  has been trying to hold back on some of the pain medications as he is concerned about the previous mental status changes that she had experienced.  · She reports severe neuropathy and not willing to proceed with chemotherapy today.  · I am definitely concerned if we continue with the Taxotere it may result in worsening neuropathy.  · [id number] performed in July 2022 did not show any significant targetable mutations.  · Gemcitabine to start 5/30/2023, labs reviewed and stable to proceed.  Gemcitabine is weekly, 3 weeks on and 1 week off.  · Discussed with Dr. العلي regarding radiation to the brain due to slight increase in size of the lesions.  She felt like we would hold off on radiation for the time being due to her poor quality of life and see if good disease of control is obtained with gemcitabine and if quality of life improves then could consider radiation down the line.     *History of bilateral stage I breast cancer  · Patient with bilateral breast cancer, both stage I ([id number]), grade 2, ER/NE positive and HER2/melinda negative with low Ki-67.    · Patient initiated adjnuvant letrozole in May 2021.  · Letrozole discontinued as she is on Taxotere  · No changes    *Mental status changes  · Patient's mental status is back at baseline.  · Significant improvement  · Currently off all psychotropic medications.  · MRI of the brain 3/28/2023 with slight improvement in the 9 lesions noted previously  · Mental status remains at baseline  · 4/28/2023-MRI shows new strokes  · Mental status at baseline     *Cancer related pain  · Patient has been receiving Duragesic patch 50 mcg every 72 hours in addition to oxycodone 15 mg every 4 hours for breakthrough pain.  Duragesic dose had been escalated recently in the outpatient setting due to worsening pain  · On admission 9/23/2022, Duragesic patch increased to 75 mcg daily, added Decadron 4 mg IV every 6 hours with  continuation of oxycodone 15 mg every 4 hours as needed for breakthrough pain in addition of Dilaudid 1 mg every 2 hours as needed for breakthrough pain.  · Patient with increasing side effects from narcotics, Duragesic decreased on 9/25/2022 from 75 down to 50 mcg patch every 72 hours.  · Dexamethasone dose decreased to 4 mg p.o. every 12 hours on 9/27/2022  · Initiated palliative radiation to the right sacrum on 9/28/2022  · Patient became confused after receiving Dilaudid.  Patient and family asked to discontinue Dilaudid.  · Duragesic patch dose was increased to 75 mcg/h on 9/30/2022. Subsequent decrease to 50 mcg due to confusion  · Gabapentin was discontinued due to the sedating effect.  · Oxycodone is being used for breakthrough pain.  · 10/3 transition to sustained release oxycodone 40 mg bid and fentanyl patch stopped  · Poorly controlled, pain particularly worse in sitting and standing position.  · Unsure if extends is contributing to mental status changes as previously patient had been very sensitive to pain meds.  · Due to this reason we will discontinue the Xtampza ER and switch to OxyContin 20 mg twice daily.  · Mental status returned to baseline on holding the pain medications.  · However patient now reports significant pain in her back as well as her feet which is very poorly controlled.  · She is receiving oxycodone 20 mg for pain control.  · Patient also expressed that she does not have a good quality of life due to an inadequate pain control at this time.  · Referral to palliative care has been placed.  · She was started on 100 mg of gabapentin at her last visit however her  notes that she is increasingly confused.  Discussed changing gabapentin to every other day      *Anxiety/depression  · Currently all medications have been discontinued due to mental status changes  · Continue Xanax as needed  · Tearful today due to inadequate pain control.  · Continue oxycodone  · Referred to palliative  care  · Gabapentin every other day     *Insomnia  · She has been receiving Xanax as needed  · Continue Xanax currently  · Improved       *Anemia  · Hemoglobin 7.6 3/14/2023.  B12, ferritin, iron studies, and folate unremarkable.  · 1 unit of PRBC given 3/16/2023  · 3/21/2023 hemoglobin 8.4  · 3/14/2023-iron studies reviewed and iron saturation 24%, TIBC 329, ferritin 189  · Vitamin B12 borderline at 376, folic acid normal at greater than 20  · Hemoglobin lower at 8.3 today.  · INR 3.7  · Decrease Coumadin to 1 mg daily     *Peripheral neuropathy  · Patient was on gabapentin 300 mg twice daily.  · Gabapentin was discontinued on 9/30/2022.  · Neuropathy stable  · Patient noting heaviness to both lower extremities when seen on 3/21/2023, possible worsening neuropathy secondary to Taxotere?  Reevaluate further at next visit once anticoagulant changed and possibly acute DVT pain eliminated.  · Continues to experience severe neuropathy.  · We will discontinue Taxotere as her symptoms are very poorly controlled at this time and I am worried that Taxotere would worsen the neuropathy.  · Change gabapentin 200 mg every other day     *Nausea vomiting and abdominal discomfort  · Continues to experience vomiting.  · Abdominal discomfort has resolved  · PET/CT with liver metastasis.  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  · Continue Compazine as well as Zofran as needed  · Nausea and vomiting have pretty much resolved.  · Noted to have area of focal uptake on the PET/CT over the gallbladder which is suggestive of cholecystitis.    *Mouth sores  · Not an issue today    *GI prophylaxis  · Continue omeprazole daily  · No changes  · PET/CT suggestive of esophagitis    *Deconditioning  · Secondary to significant lower extremity edema, severe poorly controlled back pain.  · Offered physical therapy and Occupational Therapy however patient refused  · She plans to try to do some exercises at home by  herself    *Hypokalemia  · Potassium normal at 3.8    *Brain metastasis  · Small increase in size of the metastatic lesions by 1 to 2 mm  · Discussed with Dr. العلي who recommends holding off on radiation at this time due to poor quality of life  · We will reassess with MRI down the line    *Abnormal LFTs  · LFTs normal today    *Acute left soleal deep vein thrombosis 3/21/2023  · Patient seen 3/21/2023 after having a left lower extremity Doppler due to reports of severe left lower extremity pain with redness and some swelling noted.  Preliminary results positive for acute soleal DVT.  Patient continues on Eliquis 5 mg twice daily and denies missing doses.  Case discussed with Dr. Snell and we will obtain additional lab work including beta-2 glycoprotein, lupus anticoagulant, and anticardiolipin antibody.  We will proceed with switching her Eliquis to Xarelto 15 mg twice daily x21 days then 20 mg daily thereafter.  She was given samples for the loading dose and will need a prescription at a later date for the 20 mg once daily.  · Hypercoagulability work-up particularly antiphospholipid syndrome labs have been reviewed and negative  · Xarelto discontinued due to multiple new strokes  · Currently on Lovenox.  Patient is having significant trouble tolerating Lovenox.  · INR noted to be 3.8 on Coumadin  · Pharmacy managing the INR  · Now with bilateral lower extremity edema right greater than left.  Discussed obtaining a repeat Doppler however they would like to wait for another week    *Excessive bleeding  · She had a recent fall injuring her right forearm resulting in excessive bleeding.  · She is also on therapeutic Lovenox which is making the significantly worse.  · Patient is also complaining of trouble with Lovenox injections resulting in large bruises on her abdomen.  · Continue Coumadin, INR 3.8    *Leukocytosis  · Likely reactive  · Unchanged  · Continue to monitor    *bilateral lower extremity  edema  · Secondary to venous stasis  · Recommend compression and elevation of the lower extremities  · She has not used compression.  · She has been elevating the lower extremities some.  · Start Lasix 20 mg daily        RECOMMENDATIONS:    1. Coumadin dosing by pharmacy, INR is 3.7.  Recommend decreasing Coumadin to 1 mg daily  2. Compression socks for lower extremities  3. Proceed with gemcitabine  4. Gabapentin every other day  5. Referred to palliative care  6. Continue Xanax and oxycodone  7. Lasix 20 mg daily  8. Follow-up in 1 week      I spent 42 minutes caring for Holli on this date of service. This time includes time spent by me in the following activities: preparing for the visit, reviewing tests, obtaining and/or reviewing a separately obtained history, performing a medically appropriate examination and/or evaluation, counseling and educating the patient/family/caregiver, ordering medications, tests, or procedures, documenting information in the medical record, independently interpreting results and communicating that information with the patient/family/caregiver and care coordination.       Salam Snell MD  [facility] OUTPATIENT PROGRESS NOTE        CHIEF COMPLAINT/REASON FOR VISIT:  Metastatic non-small cell lung cancer  Bilateral breast cancer    Interval history  Patient presents to the clinic today for follow-up.  She was recently admitted to the hospital after an outpatient MRI of the brain demonstrated new strokes.  Patient called the clinic for follow-up  On 4/27/2023 and complained of transient right upper extremity weakness.  MRI of the brain performed 4/28/2023 demonstrated multiple small acute infarcts in the left middle cerebral artery territory.  Some equivocal increase in size of the multiple metastatic lesions by maybe 1 to 2 mm.    Xarelto was discontinued and treatment was changed to Lovenox while she was in the hospital.  Patient is complaining of multiple bruises on  her abdomen from the Lovenox injections.  She fell on her way to the bathroom and injured her right forearm resulting in significant amount of bleeding.  They had to hold the Lovenox injection last night because of the excessive bleeding.  She is also complaining of severe pain in her back that started a few days ago.  Mr. Patel is giving her OxyContin in addition to the oxycodone 15 mg to help with pain control.  She feels like the pain has been improving.    She is also experiencing worsening bilateral lower extremity edema.  She has been sitting for the most part and unable to ambulate much because of the back pain.  She is also not able to lay in bed due to the severe back pain.  She tried using compression socks but felt extremely uncomfortable and has not been using that.    Oncologic history  Ms. 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     3/1/2021-bilateral screening mammogram-microcalcifications seen in the posterior one third of the lateral aspect of the right breast.  Area of focal asymmetry seen in the middle one third of the upper inner quadrant of the left breast.     3/1/2021-DEXA scan-T score of -2.2 on the lumbar spine and T score of -2.3 in the left hip and T score of -1.9 in the right hip.  Findings consistent with osteopenia     3/23/2021-screening lung CT-there is a 10 x 11 mm solid nodule in the left upper lobe.  Enlarged AP window lymph node measuring 14 x 10 mm.  Suggestion of a possible 9 mm left hilar lymph node.  Heavy coronary artery calcification.     3/23/2021-diagnostic mammogram bilateral-cluster of microcalcifications in the middle third lateral aspect of the right breast.  Left breast demonstrates persistence of the area of focal asymmetry in the region.     Ultrasound-left breast ultrasound at 10 o'clock position, 6 cm from the nipple there is a 0.4 cm irregular hypoechoic  lesion.  Stereotactic biopsy of the right breast calcifications recommended.  Ultrasound-guided biopsy of the left breast lesion recommended     4/7/2021-right breast stereotactic biopsy and left breast ultrasound-guided biopsy  Pathology  Right breast-invasive ductal carcinoma, grade 2, lymphovascular invasion present, ER +99% strong, IL +80% moderate, HER-2 negative, Ki-67 12%  Left breast upper inner quadrant 10 o'clock position biopsy, invasive ductal carcinoma, grade   2, ER +99% strong, IL +40% strong, HER-2 2+ on immunohistochemistry, nonamplified on FISH Ki-67 10%     4/14/2021-PET/CT-FDG avid aortopulmonary window lymph node measures 0.9 cm with an SUV of 7.5.  FDG avid left hilar lymph node measures 1 cm with an SUV of 17.2.  Irregular soft tissue density in the right breast measuring 3.7 x 3.8 cm is favored to be secondary to the recent breast biopsy.  1.1 cm pulmonary nodule within the left upper lobe with SUV 9.7 .  Sub-6 mm pulmonary nodules are present in the right lower lobe.  No evidence of lymphadenopathy or metastatic disease in the abdomen.  Focal area of FDG uptake within the central canal posterior to T11-T12 displaced demonstrating an SUV of 5.5 thought to be reactive however MRI is recommended for further evaluation.     She was seen by Dr. Molina who initially planned for breast surgery however  due to the PET abnormalities she has been referred to Dr. Kerr who plans to do a wound on 4/30/2021.  Dr. Molina's and Dr. Kerr's notes reviewed.     Patient denies any family history of breast cancer.  Her mother had lymphoma.  Maternal grandmother had colon cancer.  Prior to that screening mammogram she did not have any palpable abnormalities of either breast.     She has been a heavy smoker for about 40 years and smoked 2 packs/day.  Denies any recent weight changes, new bone pains, cough, abdominal pain nausea vomiting constipation or diarrhea.  She does have anxiety and has been on several  medications.  She has recently been started on Xanax to help with the mood and also with insomnia.     Patient had a video-assisted thoracoscopy and mediastinal lymphadenectomy on 4/30/2021.  L5-L6 lymph nodes were biopsied however the small pulmonary nodule in the left upper lobe was not difficult to find.  Pathology showed moderately differentiated adenocarcinoma which is CK7 and TTF-1 positive.  Consistent with lung primary.  Ki-67 85%.     5/14/2021-MRI of the brain-minimal chronic small vessel ischemic change in the white matter.  Otherwise negative MRI.     5/20/2021-bilateral axillary ultrasound-no evidence of axillary lymphadenopathy in either axilla.  Normal-appearing bilateral axillary lymph nodes visualized.     Cycle 1 cisplatin and Alimta on 5/25/2021.     Cycle 2 cisplatin Alimta 6/15/2021     Cycle 3 cisplatin Alimta 7/7/2021     Completed radiation on 7/12/2021     Port was nonfunctional hence a port study was performed which showed that the port was backed up into the innominate vein and also there was a nonocclusive thrombus at the end of the catheter extending into the superior vena cava.  She was started on anticoagulation with Eliquis.  It was recommended for port revision of the intention to keep the port.     PET/CT 8/5/2021-images independently reviewed and interpreted by me-decrease in size and FDG uptake of the left upper lobe pulmonary nodule as well as mediastinal and left hilar lymphadenopathy representing response to treatment.  Sub-6 mm pulmonary nodule in the left upper lobe new since 4/14/2021.  This could be related to radiation however follow-up CT in 3 months recommended.  Decrease in size of the irregular masslike tissue in the right breast which is postbiopsy hematoma.  This is not PET avid.  New segmental left eighth rib fractures healing.  A new band of sclerosis of the left seventh rib which favored to represent healing nondisplaced fracture.  Follow-up CT recommended.  focal  uptake in the duodenum first and second portions.  Could be related to duodenitis.  Indeterminate lesion in the L1 vertebral body not well evaluated on PET/CT.     10/1/2021-MRI of the lumbar spine.  Lesion in the left posterior body of L1 measures 14 x 14 x 12 mm and previously 5 x 5 x 6 mm.  The interval enlargement strongly suggest that it is metastatic lesion.  No additional osseous metastasis noted.   Other chronic changes noted.     10/14/2021-biopsy of the lumbar spine lesion-pathology consistent with poorly differentiated carcinoma.  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     10/26/2021-brain MRI-no evidence of metastatic disease.     10/26/2021-bilateral diagnostic mammogram and ultrasound  Scattered fibroglandular density in both breast.  Postbiopsy hematoma with internal biopsy clip in the upper outer quadrant of the right breast, 8 cm from the nipple.  The hematoma size is decreased to 2.9 cm from 3.6 cm earlier.     Left breast-parenchymal density measuring 9 x 10 mm has markedly decreased.  Few adjacent calcifications which are unchanged.  No new abnormality in the left breast.  At 10:00 region there is some minimal adjacent hypoechoic texture which is difficult to measure but appears smaller since the previous exam.     10/28/2021-PET/CT  New L1 and L2 lytic bone metastasis annually intensely hypermetabolic focus at the left adrenal gland likely represents metastatic disease as well.  Slight decrease in size of the 0.9 x 0.7 cm left upper lobe pulmonary nodule.  There is hypermetabolic activity related to radiation pneumonitis.  The remainder of the nodule is photopenic.  Uncertain etiology of the more intense activity along the right lateral peripheral margin of the 2.6 cm postbiopsy density of the right breast.     She completed radiation to to the lumbar spine on 11/19/2021 11/22/2021-cycle 1 Alimta and  Keytruda     3/23/2022-bilateral diagnostic mammogram and ultrasound  Complete resolution of the microcalcifications in the upper outer quadrant of the right breast and decrease in size of the postbiopsy hematoma.  No suspicious abnormalities in the right breast.  Benign-appearing calcifications at the site of malignancy in the left breast upper inner quadrant.  No other suspicious findings.     4/11/2022 PET/CT-there is new groundglass opacities in the left upper lobe which are most likely postradiation changes.  Other groundglass opacities in the left lung as well as the right lung remained stable.  Increased nodularity of the left adrenal gland with an SUV of 5.6, previously 3.5.    Increased uptake in the L1 vertebra in the right posterior aspect with an SUV of 3.6.  Left L1 sclerosis increased     Due to this the case was discussed in multidisciplinary conference.  The disease progression was significant in the left adrenal gland as well as the L1 vertebra.  Since there were only 2 areas of disease progression it has been decided to proceed with radiation to these 2 spots and continue on Keytruda and Alimta.     Patient also has had worsening of her kidney function.  We initially held treatment as of 5/31/2022 and creatinine bumped up to 2.0, BUN 23.  We then resumed therapy 6/11/2022 with Keytruda alone.  Patient was referred to nephrology.     6/6/2022-MRI of the spine-diffuse marrow replacement in the L1 vertebral body and inferior endplate concavity.  Suspicious for metastatic disease with pathological fracture in the inferior endplate.  Also diffuse signal abnormality in the L2 lamina on the left suspicious for additional metastatic disease.  Abnormality of the first sacral segment suspicious for metastatic disease.  Left adrenal gland appears enlarged.  30 to 40% height loss and L2 vertebral body suggestive of a subacute compression fracture.  Degenerative changes.     7/18/2022-PET/CT  Multiple  hypermetabolic osseous lesions with index lesions which have either increased in degree of FDG uptake or newly hypermetabolic lesions representative of metastatic disease and progression of disease.  Possible FDG avid lesion in the left femoral diaphysis however these are incompletely visualized and in the area of artifactual FDG uptake.  MRI of the left femur recommended for further evaluation.  Increasing FDG uptake of the left adrenal gland.  Focal left hilar hypermetabolic him corresponds to the lymph node which has minimally increased in size compared to April 2022.  New sub-6 mm pulmonary nodules.     7/26/2022-cycle 1 of Taxotere     Patient was admitted to the hospital 8/3/2022 discharged on 8/9/2022.  She was admitted for strokelike symptoms and confusion.  The confusion was thought to be secondary to polypharmacy and probably febrile neutropenia.  She was treated with antibiotics.  Mental status improved subsequently.  MRI of the cervical thoracic and lumbar spine was performed on 8/4/2022.  Images independently reviewed by me.  Multiple metastatic lesions in the spine noted.  There was a lesion on the CT which was concerning.  There is also a sacral Lesion measuring up to 5 cm in transverse dimension on the right.  Patient was symptomatic with pain on the right side of the sacrum.  Radiation oncology recommended radiation to the cervical spine as well as sacrum.  CT head without any obvious abnormalities.     Completed radiation to the cervical spine and sacrum on 8/12/2022    She was admitted to the hospital between 9/23/2022 to 10/12/2022.  She was admitted for poorly controlled pain as well as encephalopathy and multiple falls.  She was diagnosed with a UTI and treated with IV Rocephin.  Pain medications changed and also additional radiation to the right sacrum was performed.  3000 cGy in 10 fractions was administered.  She was discharged on oxycodone 15 mg every 4 hours as needed for pain and  OxyContin twice daily.  She was recommended to go to a subacute rehab however she preferred to be discharged home with home physical therapy and Occupational Therapy    9/24/2022-MRI of the pelvis showed osseous metastatic disease in the sacrum worst within the right and probable pathological fractures bilaterally.    10/4/2022 CT head without any evidence of metastatic disease or acute abnormalities.    10/25/2022-PET/CT-resolution of hypermetabolic activity in the upper lobe and left hilum.  Previously noted new tiny pulmonary nodules have resolved.  Resolution of hypermetabolic activity in the left adrenal gland.  Near complete resolution of hypermetabolic activity in the skeletal metastasis particularly the cervical spine, pelvic bones and proximal left femur.  Maximal SUV at the right sacral metastasis is 2.7 and previously it was 8.9.  Tiny focus of hypermetabolic activity at the GE junction with a maximal SUV of 4.7.  No definite thickening or abnormality noted on the CT.    11/15/2022-lumbar puncture with negative cytology of the CSF.    11/21/2022-MRI of the brain with contrast and MRI of the cervical spine with contrast without any evidence of metastatic disease.    1/12/2023-PET/CT  Hypermetabolic mediastinal lymphadenopathy and multiple liver lesions which are hypermetabolic suggestive of metastasis.  New tiny left pleural effusion is noted.  Progression of bilateral sacral fractures and right L5 transverse process fracture.  There is also an acute/subacute L1 fracture.  Lumbar spine x-rays have been recommended for evaluation of the vertebral height or lumbar MRI to be compared to the priors.    1/23/2023-MRI of the brain  At least 9 separate tiny 2 to 6 mm enhancing lesions in the brain compatible with multiple new brain metastasis.  There has been interval development of 3 separate foci of encephalomalacia concerning for chronic infarcts but these are new since August 2022.  Stable osseous metastasis  "at C2.    3/27/2023-PET/CT  Significant interval improvement of the previously seen hypermetabolic mediastinal lymphadenopathy as well as hepatic lesions.  Focal area of uptake noted in the gallbladder fundus raising concern for cholecystitis.  0.6 cm pulmonary nodule in the left upper lobe is new and follow-up CT in 3 months recommended.  New bilateral groundglass opacities in lower lobes suggestive of atypical pneumonia.  Long segment mild to moderate uptake within the esophagus suggestive of esophagitis.    3/28/2023-MRI of the brain  There are at least 9 separate 2 to 6 mm enhancing lesions in the superficial cortex of the brain which have shown a slight interval decrease in size and also decreased enhancement noted.  No new enhancing lesions noted.  Concern for focal areas of leptomeningeal seeding.  Unchanged from previous MRI tiny old lacunar infarcts in the left MCA territory.     MRI of the brain 4/28/2023-multiple new strokes in the MCA territory.  1 to 2 mm increase in size of the previously known metastatic lesions    Admitted to the hospital and evaluated by neurology and treatment has been changed to Lovenox.  2D echocardiogram without any evidence of blood clots.    Vitals:    05/30/23 1133   BP: 130/91   Pulse: 104   Resp: 18   Temp: 96.9 °F (36.1 °C)   TempSrc: Temporal   SpO2: 98%   Weight: 74.2 kg (163 lb 9.6 oz)   Height: 160 cm (62.99\")   PainSc:   4   PainLoc: Foot         PHYSICAL EXAMINATION:    General: Oriented to person, place, and time.  Ill-appearing.  Pale.  HEENT: EOMI.  No bruising/edema noted.  Chest/Lungs: Clear to auscultation bilaterally. Right chest mediport in place  Heart: RRR, sinus tachycardia  Extremities: Bilateral lower extremity without any swelling.  Decreased strength in both lower extremities.  Left lower extremity with scant redness to upper foot  No focal deficits.    I have reexamined the patient and the results are consistent with the previously documented exam. " Salma Snell MD     DIAGNOSTIC DATA:  Results Review:        Results from last 7 days   Lab Units 05/30/23  1124   WBC 10*3/mm3 13.41*   HEMOGLOBIN g/dL 8.8*   HEMATOCRIT % 30.3*   PLATELETS 10*3/mm3 184     Lab Results   Component Value Date    NEUTROABS 12.30 (H) 05/30/2023         Results from last 7 days   Lab Units 05/30/23  1120   INR  3.70*             IMAGING:    Duplex Venous Lower Extremity - Left CAR (03/21/2023 10:25)    PET/CT 1/12/2023 and MRI of the brain 1/23/2023-images independently reviewed and interpreted by me.    ASSESSMENT:  This is a 57 y.o. female with:     *Metastatic non-small cell lung cancer  · She was initially diagnosed with stage III (T1N2M0) disease.    · She received initial therapy with cisplatin and Alimta concurrent with radiation therapy beginning 5/25/2021.    · There were indeterminate findings in the lumbar spine at L1 and it was recommended to monitor this over time.    · Patient completed 3 cycles cisplatin and Alimta 7/7/2021 and completed radiation therapy 7/12/2021.    · PET scan 8/6/2021 showed good response to treatment.    · MRI lumbar spine 10/1/2021 with increase in size of the L1 lesion.    · Biopsy of the L1 lesion on 10/14/2022 confirmed metastatic adenocarcinoma of lung origin, PD-L1 TPS 0.    · PET scan 10/26/2021 with involvement of left adrenal and L1/L2 only.    · Patient received radiation therapy to L1/L2 on 11/19/2021.  She also received radiation to the left adrenal gland.    · She initiated further systemic therapy on 11/22/2021 with Keytruda and Alimta.   · Follow-up PET scan 1/18/2022 with decrease in small left upper lobe pulmonary nodule, resolution of activity at L1/L2, no new sites of disease.    · Guardant 360 CT DNA level was monitored and was decreasing.    · PET scan 4/11/2022 with progression in left adrenal and L1.    · Maintained systemic therapy with Keytruda and Alimta and received additional radiation to left adrenal and L1.  Alimta  however held since 5/10/2022 due to renal dysfunction.  Radiation completed to lumbar spine 7/14/2022.    · PET scan 7/18/2022 with multiple areas of progression of the spine and right sacrum.    · Guardant 360 analysis 7/20/2022 with no actionable mutations.    · Change in therapy to single agent palliative Taxotere initiated 7/26/2022.    · Altered mental status and febrile neutropenia requiring hospitalization 8/2 - 8/9/2022.  During that admission, MRI brain, cervical, thoracic, lumbar spine performed 8/4/2022 in addition to CT cervical spine 8/5/2022.  There was evidence of C2 metastatic lesion with some dural enhancement, compression deformity at T7 with mild edema suggesting acute to subacute fracture, 5 mm T12 spinous process metastasis, multifocal disease in the lumbar spine and sacrum, largest involving sacral ala to the right 5 cm in transverse dimension.  Loss of height at L2 25%.  · Patient received cycle 3 Taxotere 9/6/2022 with Neulasta support.    · She has been continuing as well on Xgeva every 4 weeks (last received 9/6/2022).  · Patient did undergo MRI lumbar spine and pelvis on 9/24/2022.  MRI pelvis showed bilateral sacral fractures with marrow infiltration related to metastasis more prominent on the right.  Lesion on the right 3.5 x 4.1 x 4.3 cm.  Also probable metastasis along posterior acetabulum on the right 1.5 cm.  MRI lumbar spine with stable L1 metastasis, new edema endplate T12 possibly stress reaction versus developing new metastasis, lesion at T12 spinous process unchanged.   · Radiation oncology consulted with plans for palliative treatment of the right sacral metastasis.  Treatment initiated 9/28/2022  · Completed radiation to the right sacral metastasis on 10/11/2022.  · PET/CT 10/25/2022 without any metabolic uptake in the lung or the skeletal metastasis.  This is indicative of a positive response to Taxotere.  · Pain persistent sitting and walking position and not present when  she is laying down.  · Patient is still very active and performance status is very poor.  Due to this reason chemotherapy will be continued to be held.  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  · Due to this reason an MRI of the brain was obtained but unfortunately this was performed without contrast.  Reviewed the MRI and no evidence of metastatic disease.  · 11/15/2022 lumbar puncture shows no evidence of malignancy in the CSF, elevated protein at 65.2, glucose normal at 58, culture no growth in 3 days  · Repeat MRI of the brain with contrast 11/21/2022 does not show any obvious abnormalities.  There is no enhancement of the CSF.  MRI of the cervical spine with no abnormalities.  · Patient unable to undergo MRI of the thoracic and lumbar spine due to mental status and unable to being in 1 position as well as with incontinence.  · 1/4/2023-mental status has improved significantly.  Patient reports decreased oral intake and nausea and vomiting.  She is also reporting abdominal discomfort.  · 1/12/2023-PET/CT with mediastinal lymphadenopathy as well as multiple liver lesions suggestive of disease progression.  · 1/23/2023-MRI of the brain with at least 9 different foci of small mets measuring up to 6 mm.  There is also areas of encephalomalacia concerning for chronic infarcts.    · Resumed Taxotere 1/31/2023.  Dose reduced to 60 mg per metered square.  · 3/27/2023-PET/CT shows significant response in the chest as well as the hepatic lesions.  · 3/28/2023-MRI of the brain-improvement in the metastatic disease to the brain with decrease in the size as well as decrease in enhancement noted.  · 4/25/2023-cycle 8 of Taxotere.  · Patient was admitted to the hospital on 4/28/2023 due to multiple new strokes.  · Scheduled for chemotherapy next week.  MRI is  · MRI of the brain 4/28/2023 shows small increase in size of the metastatic lesions in  the brain by 1 to 2 mm.  · Patient continues to have extremely poor functional status.  · We will reassess next week and resume chemo if she is functionally better.     *History of bilateral stage I breast cancer  · Patient with bilateral breast cancer, both stage I ([id number]), grade 2, ER/NM positive and HER2/melinda negative with low Ki-67.    · Patient initiated adjnuvant letrozole in May 2021.  · Letrozole discontinued as she is on Taxotere  · No changes    *Mental status changes  · Patient's mental status is back at baseline.  · Significant improvement  · Currently off all psychotropic medications.  · MRI of the brain 3/28/2023 with slight improvement in the 9 lesions noted previously  · Mental status remains at baseline  · 4/28/2023-MRI shows new strokes     *Cancer related pain  · Patient has been receiving Duragesic patch 50 mcg every 72 hours in addition to oxycodone 15 mg every 4 hours for breakthrough pain.  Duragesic dose had been escalated recently in the outpatient setting due to worsening pain  · On admission 9/23/2022, Duragesic patch increased to 75 mcg daily, added Decadron 4 mg IV every 6 hours with continuation of oxycodone 15 mg every 4 hours as needed for breakthrough pain in addition of Dilaudid 1 mg every 2 hours as needed for breakthrough pain.  · Patient with increasing side effects from narcotics, Duragesic decreased on 9/25/2022 from 75 down to 50 mcg patch every 72 hours.  · Dexamethasone dose decreased to 4 mg p.o. every 12 hours on 9/27/2022  · Initiated palliative radiation to the right sacrum on 9/28/2022  · Patient became confused after receiving Dilaudid.  Patient and family asked to discontinue Dilaudid.  · Duragesic patch dose was increased to 75 mcg/h on 9/30/2022. Subsequent decrease to 50 mcg due to confusion  · Gabapentin was discontinued due to the sedating effect.  · Oxycodone is being used for breakthrough pain.  · 10/3 transition to sustained release oxycodone 40 mg bid and  fentanyl patch stopped  · Poorly controlled, pain particularly worse in sitting and standing position.  · Unsure if extends is contributing to mental status changes as previously patient had been very sensitive to pain meds.  · Due to this reason we will discontinue the Xtampza ER and switch to OxyContin 20 mg twice daily.  · She continues to experience severe mental status changes.  · Long-acting pain medications discontinued due to mental status changes and she is currently on oxycodone 15 mg as needed.  · Reports better pain control after the epidural injection of the spine.  · Reports severe pain in her sacral area while she was moving in bed.  Currently taking OxyContin and oxycodone.  · Continue current regimen.  · If no improvement in symptoms may have to repeat scans.      *Anxiety/depression  · Currently all medications have been discontinued due to mental status changes  · Continue Xanax as needed  · Poorly controlled today due to inadequate pain control and bilateral lower extremity edema     *Insomnia  · She has been receiving Xanax as needed       *Anemia  · Hemoglobin 7.6 3/14/2023.  B12, ferritin, iron studies, and folate unremarkable.  · 1 unit of PRBC given 3/16/2023  · 3/21/2023 hemoglobin 8.4  · 3/14/2023-iron studies reviewed and iron saturation 24%, TIBC 329, ferritin 189  · Vitamin B12 borderline at 376, folic acid normal at greater than 20  · Hemoglobin decreased to 6.9 while she was in the hospital and received 1 unit of PRBC on 4/28/2023.  · Hemoglobin today 9.6.     *Peripheral neuropathy  · Patient was on gabapentin 300 mg twice daily.  · Gabapentin was discontinued on 9/30/2022.  · Neuropathy stable  · Patient noting heaviness to both lower extremities when seen on 3/21/2023, possible worsening neuropathy secondary to Taxotere?  Reevaluate further at next visit once anticoagulant changed and possibly acute DVT pain eliminated.  · Likely some worsening.  Now with bilateral lower extremity  edema.  · Reassess next week prior to proceeding with chemotherapy     *Nausea vomiting and abdominal discomfort  · Continues to experience vomiting.  · Abdominal discomfort has resolved  · PET/CT with liver metastasis.  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  · Continue Compazine as well as Zofran as needed  · Nausea and vomiting have pretty much resolved.  · Noted to have area of focal uptake on the PET/CT over the gallbladder which is suggestive of cholecystitis.    *Mouth sores  · 3/21/2023 patient reporting mouth sores or not being helped with her current Magic mouth rinse.  Discussed with Brooklynn Powell RN and current prescription does not have steroids added, requested dexamethasone to be added to new prescription.    *GI prophylaxis  · Continue omeprazole daily  · No changes  · PET/CT suggestive of esophagitis    *Deconditioning  · Much worse since recent admission to hospital.  · She has not been able to ambulate much due to significant bilateral lower extremity edema.  · Also has severe back pain.    *Hypokalemia  · Potassium normal at 3.8    *Brain metastasis  · Small increase in size of the metastatic lesions by 1 to 2 mm    *Abnormal LFTs  · LFTs normal today    *Acute left soleal deep vein thrombosis 3/21/2023  · Patient seen 3/21/2023 after having a left lower extremity Doppler due to reports of severe left lower extremity pain with redness and some swelling noted.  Preliminary results positive for acute soleal DVT.  Patient continues on Eliquis 5 mg twice daily and denies missing doses.  Case discussed with Dr. Snell and we will obtain additional lab work including beta-2 glycoprotein, lupus anticoagulant, and anticardiolipin antibody.  We will proceed with switching her Eliquis to Xarelto 15 mg twice daily x21 days then 20 mg daily thereafter.  She was given samples for the loading dose and will need a prescription at a later date for the 20 mg once daily.  · Hypercoagulability  work-up particularly antiphospholipid syndrome labs have been reviewed and negative  · Xarelto discontinued due to multiple new strokes  · Currently on Lovenox.  Patient is having significant trouble tolerating Lovenox.  · We will discontinue Lovenox and start Coumadin  · Pharmacy will be consulted    *Excessive bleeding  · She had a recent fall injuring her right forearm resulting in excessive bleeding.  · She is also on therapeutic Lovenox which is making the significantly worse.  · Patient is also complaining of trouble with Lovenox injections resulting in large bruises on her abdomen.  · We will change the treatment to Coumadin.    *Leukocytosis  · Likely reactive  · Continue to monitor  ·   *bilateral lower extremity edema  · Secondary to venous stasis  · Recommend compression and elevation of the lower extremities    RECOMMENDATIONS:    9. Consult pharmacy regarding Coumadin dosing.  Discontinue Lovenox once INR is therapeutic  10. Discussed with Dr. Patty العلي regarding need for radiation  11. Compression socks for lower extremity edema  12. She is scheduled for chemotherapy next week.  Patient reports that she is not ready to proceed with more chemotherapy.  If her functional status continues to appear poor then we may have to consider comfort measures.  13. We will also have to reassess the neuropathy again prior to proceeding with chemotherapy next week.      I spent 52 minutes caring for Holli on this date of service. This time includes time spent by me in the following activities: preparing for the visit, reviewing tests, obtaining and/or reviewing a separately obtained history, performing a medically appropriate examination and/or evaluation, counseling and educating the patient/family/caregiver, ordering medications, tests, or procedures, documenting information in the medical record, independently interpreting results and communicating that information with the patient/family/caregiver and care  coordination.       Salma Snell MD

## 2023-05-30 NOTE — PROGRESS NOTES
Anticoagulation Clinic Progress Note    Patient's visit was held by phone today.    Anticoagulation Summary  As of 5/30/2023    INR goal:  2.0-3.0   TTR:  15.6 % (1.7 wk)   INR used for dosing:  3.70 (5/30/2023)   Warfarin maintenance plan:  2 mg (4 mg x 0.5) every Tue, Sat; 4 mg (4 mg x 1) all other days; Starting 5/30/2023   Weekly warfarin total:  24 mg   Plan last modified:  Annetta Mcgregor RPH (5/23/2023)   Next INR check:  6/2/2023   Priority:  Critical   Target end date:  Indefinite    Indications    Acute deep vein thrombosis (DVT) of left lower extremity  unspecified vein [I82.402]             Anticoagulation Episode Summary     INR check location:      Preferred lab:      Send INR reminders to:   [facility] CBC ANTICOAG POOL    Comments:  [facility] lab/in clinic New to warf May 2023      Anticoagulation Care Providers     Provider Role Specialty Phone number    Salma Snell MD Referring Hematology and Oncology [phone]          Drug interactions: has remained unchanged.  Diet: has remained unchanged.    Clinic Interview:  No pertinent clinical findings have been reported.    INR History:      Latest Ref Rng & Units 5/16/2023     9:15 AM 5/19/2023     9:45 AM 5/19/2023     9:46 AM 5/23/2023     9:45 AM 5/23/2023     9:50 AM 5/30/2023    10:15 AM 5/30/2023    11:20 AM   Anticoagulation Monitoring   INR  3.80 2.80  3.40  3.70    INR Date  5/16/2023 5/19/2023 5/23/2023 5/30/2023    INR Goal  2.0-3.0 2.0-3.0  2.0-3.0  2.0-3.0    Trend  Same Same  Down  Same    Last Week Total  32 mg 28 mg  24 mg  24 mg    Next Week Total  24 mg 28 mg  24 mg  18 mg    Sun  - 4 mg  4 mg  -    Mon  - 4 mg  4 mg  -    Tue  Hold (5/16) -  2 mg  Hold (5/30)    Wed  4 mg -  4 mg  2 mg (5/31)    Thu  4 mg -  4 mg  2 mg (6/1)    Fri  - 4 mg  4 mg  -    Sat  - 4 mg  2 mg  -    Historical INR 0.90 - 1.10   2.80    3.40    3.70     Visit Report  Report     Report Report       Plan:  1. INR is Supratherapeutic today- see above in  Anticoagulation Summary.   Will instruct Holli Patel to HOLD today and take 1/2 dose tomorrow (2mg)- see above in Anticoagulation Summary.  2. Follow up in 3 days on friday  3.They have been instructed to call if any changes in medications, doses, concerns, etc. Patient expresses understanding and has no further questions at this time.    Shanita Thomas, [facility]

## 2023-06-02 ENCOUNTER — ANTICOAGULATION VISIT (OUTPATIENT)
Dept: ONCOLOGY | Facility: HOSPITAL | Age: 58
End: 2023-06-02
Payer: COMMERCIAL

## 2023-06-02 ENCOUNTER — TELEPHONE (OUTPATIENT)
Dept: ONCOLOGY | Facility: CLINIC | Age: 58
End: 2023-06-02

## 2023-06-02 ENCOUNTER — LAB (OUTPATIENT)
Dept: LAB | Facility: HOSPITAL | Age: 58
End: 2023-06-02
Payer: COMMERCIAL

## 2023-06-02 DIAGNOSIS — I82.4Y9 DEEP VEIN THROMBOSIS (DVT) OF PROXIMAL LOWER EXTREMITY, UNSPECIFIED CHRONICITY, UNSPECIFIED LATERALITY: Primary | ICD-10-CM

## 2023-06-02 DIAGNOSIS — I82.402 ACUTE DEEP VEIN THROMBOSIS (DVT) OF LEFT LOWER EXTREMITY, UNSPECIFIED VEIN: Primary | ICD-10-CM

## 2023-06-02 LAB
INR PPP: 3.1 (ref 0.9–1.1)
PROTHROMBIN TIME: 36.7 SECONDS (ref 11–13.5)

## 2023-06-02 PROCEDURE — 36416 COLLJ CAPILLARY BLOOD SPEC: CPT

## 2023-06-02 PROCEDURE — 85610 PROTHROMBIN TIME: CPT

## 2023-06-02 NOTE — PROGRESS NOTES
Anticoagulation Clinic Progress Note    Patient's visit was held by phone today.    Anticoagulation Summary  As of 6/2/2023    INR goal:  2.0-3.0   TTR:  12.6 % (2.1 wk)   INR used for dosing:  3.10 (6/2/2023)   Warfarin maintenance plan:  4 mg (4 mg x 1) every Mon, Wed, Fri; 2 mg (4 mg x 0.5) all other days; Starting 6/2/2023   Weekly warfarin total:  20 mg   Plan last modified:  Marta Ferguson RPH (6/2/2023)   Next INR check:  6/6/2023   Priority:  High   Target end date:  Indefinite    Indications    Acute deep vein thrombosis (DVT) of left lower extremity  unspecified vein [I82.402]             Anticoagulation Episode Summary     INR check location:      Preferred lab:      Send INR reminders to:   [facility] CBC ANTICOAG POOL    Comments:  [facility] lab/in clinic New to warf May 2023      Anticoagulation Care Providers     Provider Role Specialty Phone number    Salma Snell MD Referring Hematology and Oncology [phone]          Drug interactions: has remained unchanged.  Diet: has remained unchanged.    Clinic Interview:  No pertinent clinical findings have been reported.    INR History:      Latest Ref Rng & Units 5/19/2023     9:46 AM 5/23/2023     9:45 AM 5/23/2023     9:50 AM 5/30/2023    10:15 AM 5/30/2023    11:20 AM 6/2/2023     9:30 AM 6/2/2023     9:53 AM   Anticoagulation Monitoring   INR   3.40  3.70  3.10    INR Date   5/23/2023 5/30/2023 6/2/2023    INR Goal   2.0-3.0  2.0-3.0  2.0-3.0    Trend   Down  Same  Down    Last Week Total   24 mg  24 mg  20 mg    Next Week Total   24 mg  20 mg  20 mg    Sun   4 mg  -  2 mg    Mon   4 mg  -  4 mg    Tue   2 mg  Hold (5/30)  -    Wed   4 mg  2 mg (5/31)  -    Thu   4 mg  4 mg  -    Fri   4 mg  -  4 mg    Sat   2 mg  -  2 mg    Historical INR 0.90 - 1.10 2.80    3.40    3.70    3.10     Visit Report     Report Report         Plan:  1. INR is Supratherapeutic today but improved- see above in Anticoagulation Summary.   Will instruct Holli Patel to  Change their warfarin regimen- see above in Anticoagulation Summary.  2. Follow up in 1 week--while at clinic for infusion  3.They have been instructed to call if any changes in medications, doses, concerns, etc. Spouse expresses understanding and has no further questions at this time.    Marta Ferguson [facility]

## 2023-06-02 NOTE — TELEPHONE ENCOUNTER
Caller: BK CARPENTER    Relationship to patient: Emergency Contact    Best call back number: [phone]    Type of visit: PORT FLUSH, F/U 2 & INFUSION     Requested date: CALL TO R/S WANTS MORNING APPT    If rescheduling, when is the original appointment: 6/6/2023

## 2023-06-06 ENCOUNTER — APPOINTMENT (OUTPATIENT)
Dept: ONCOLOGY | Facility: HOSPITAL | Age: 58
End: 2023-06-06
Payer: COMMERCIAL

## 2023-06-06 ENCOUNTER — ANTICOAGULATION VISIT (OUTPATIENT)
Dept: ONCOLOGY | Facility: HOSPITAL | Age: 58
End: 2023-06-06
Payer: COMMERCIAL

## 2023-06-06 ENCOUNTER — LAB (OUTPATIENT)
Dept: LAB | Facility: HOSPITAL | Age: 58
End: 2023-06-06
Payer: COMMERCIAL

## 2023-06-06 ENCOUNTER — INFUSION (OUTPATIENT)
Dept: ONCOLOGY | Facility: HOSPITAL | Age: 58
End: 2023-06-06
Payer: COMMERCIAL

## 2023-06-06 ENCOUNTER — OFFICE VISIT (OUTPATIENT)
Dept: ONCOLOGY | Facility: CLINIC | Age: 58
End: 2023-06-06
Payer: COMMERCIAL

## 2023-06-06 VITALS
OXYGEN SATURATION: 95 % | SYSTOLIC BLOOD PRESSURE: 136 MMHG | DIASTOLIC BLOOD PRESSURE: 93 MMHG | TEMPERATURE: 97.5 F | RESPIRATION RATE: 18 BRPM | HEART RATE: 100 BPM

## 2023-06-06 DIAGNOSIS — Z79.899 HIGH RISK MEDICATION USE: ICD-10-CM

## 2023-06-06 DIAGNOSIS — C34.90 NON-SMALL CELL LUNG CANCER METASTATIC TO BONE: ICD-10-CM

## 2023-06-06 DIAGNOSIS — T45.1X5A CHEMOTHERAPY-INDUCED NAUSEA: ICD-10-CM

## 2023-06-06 DIAGNOSIS — C79.51 NON-SMALL CELL LUNG CANCER METASTATIC TO BONE: ICD-10-CM

## 2023-06-06 DIAGNOSIS — C79.51 NON-SMALL CELL LUNG CANCER METASTATIC TO BONE: Primary | ICD-10-CM

## 2023-06-06 DIAGNOSIS — G89.3 CANCER RELATED PAIN: ICD-10-CM

## 2023-06-06 DIAGNOSIS — I82.402 ACUTE DEEP VEIN THROMBOSIS (DVT) OF LEFT LOWER EXTREMITY, UNSPECIFIED VEIN: Primary | ICD-10-CM

## 2023-06-06 DIAGNOSIS — Z45.2 ENCOUNTER FOR FITTING AND ADJUSTMENT OF VASCULAR CATHETER: ICD-10-CM

## 2023-06-06 DIAGNOSIS — C34.90 NON-SMALL CELL LUNG CANCER METASTATIC TO BONE: Primary | ICD-10-CM

## 2023-06-06 DIAGNOSIS — R11.0 CHEMOTHERAPY-INDUCED NAUSEA: ICD-10-CM

## 2023-06-06 DIAGNOSIS — I82.4Y9 DEEP VEIN THROMBOSIS (DVT) OF PROXIMAL LOWER EXTREMITY, UNSPECIFIED CHRONICITY, UNSPECIFIED LATERALITY: Primary | ICD-10-CM

## 2023-06-06 LAB
BASOPHILS # BLD AUTO: 0.05 10*3/MM3 (ref 0–0.2)
BASOPHILS NFR BLD AUTO: 1.1 % (ref 0–1.5)
DEPRECATED RDW RBC AUTO: 61.6 FL (ref 37–54)
EOSINOPHIL # BLD AUTO: 0.03 10*3/MM3 (ref 0–0.4)
EOSINOPHIL NFR BLD AUTO: 0.7 % (ref 0.3–6.2)
ERYTHROCYTE [DISTWIDTH] IN BLOOD BY AUTOMATED COUNT: 17.7 % (ref 12.3–15.4)
HCT VFR BLD AUTO: 30.9 % (ref 34–46.6)
HGB BLD-MCNC: 9.3 G/DL (ref 12–15.9)
IMM GRANULOCYTES # BLD AUTO: 0.35 10*3/MM3 (ref 0–0.05)
IMM GRANULOCYTES NFR BLD AUTO: 8 % (ref 0–0.5)
INR PPP: 2.7 (ref 0.9–1.1)
LYMPHOCYTES # BLD AUTO: 0.27 10*3/MM3 (ref 0.7–3.1)
LYMPHOCYTES NFR BLD AUTO: 6.2 % (ref 19.6–45.3)
MCH RBC QN AUTO: 28.5 PG (ref 26.6–33)
MCHC RBC AUTO-ENTMCNC: 30.1 G/DL (ref 31.5–35.7)
MCV RBC AUTO: 94.8 FL (ref 79–97)
MONOCYTES # BLD AUTO: 0.32 10*3/MM3 (ref 0.1–0.9)
MONOCYTES NFR BLD AUTO: 7.3 % (ref 5–12)
NEUTROPHILS NFR BLD AUTO: 3.35 10*3/MM3 (ref 1.7–7)
NEUTROPHILS NFR BLD AUTO: 76.7 % (ref 42.7–76)
NRBC BLD AUTO-RTO: 0.9 /100 WBC (ref 0–0.2)
PLATELET # BLD AUTO: 117 10*3/MM3 (ref 140–450)
PMV BLD AUTO: 10 FL (ref 6–12)
PROTHROMBIN TIME: 32.2 SECONDS (ref 11–13.5)
RBC # BLD AUTO: 3.26 10*6/MM3 (ref 3.77–5.28)
WBC NRBC COR # BLD: 4.37 10*3/MM3 (ref 3.4–10.8)

## 2023-06-06 PROCEDURE — 25010000002 HEPARIN LOCK FLUSH PER 10 UNITS: Performed by: INTERNAL MEDICINE

## 2023-06-06 PROCEDURE — 85025 COMPLETE CBC W/AUTO DIFF WBC: CPT

## 2023-06-06 PROCEDURE — 99214 OFFICE O/P EST MOD 30 MIN: CPT | Performed by: NURSE PRACTITIONER

## 2023-06-06 PROCEDURE — 25010000002 PROCHLORPERAZINE 10 MG/2ML SOLUTION: Performed by: NURSE PRACTITIONER

## 2023-06-06 PROCEDURE — 25010000002 GEMCITABINE 200 MG/5.26ML SOLUTION 5.26 ML VIAL: Performed by: INTERNAL MEDICINE

## 2023-06-06 PROCEDURE — 96375 TX/PRO/DX INJ NEW DRUG ADDON: CPT

## 2023-06-06 PROCEDURE — 25010000002 DEXAMETHASONE SODIUM PHOSPHATE 100 MG/10ML SOLUTION: Performed by: INTERNAL MEDICINE

## 2023-06-06 PROCEDURE — 36415 COLL VENOUS BLD VENIPUNCTURE: CPT

## 2023-06-06 PROCEDURE — 85610 PROTHROMBIN TIME: CPT

## 2023-06-06 PROCEDURE — 96413 CHEMO IV INFUSION 1 HR: CPT

## 2023-06-06 PROCEDURE — 25010000002 GEMCITABINE 1 GM/26.3ML SOLUTION 26.3 ML VIAL: Performed by: INTERNAL MEDICINE

## 2023-06-06 RX ORDER — HEPARIN SODIUM (PORCINE) LOCK FLUSH IV SOLN 100 UNIT/ML 100 UNIT/ML
500 SOLUTION INTRAVENOUS AS NEEDED
OUTPATIENT
Start: 2023-06-06

## 2023-06-06 RX ORDER — SODIUM CHLORIDE 0.9 % (FLUSH) 0.9 %
10 SYRINGE (ML) INJECTION AS NEEDED
OUTPATIENT
Start: 2023-06-06

## 2023-06-06 RX ORDER — SODIUM CHLORIDE 0.9 % (FLUSH) 0.9 %
10 SYRINGE (ML) INJECTION AS NEEDED
Status: DISCONTINUED | OUTPATIENT
Start: 2023-06-06 | End: 2023-06-06 | Stop reason: HOSPADM

## 2023-06-06 RX ORDER — HEPARIN SODIUM (PORCINE) LOCK FLUSH IV SOLN 100 UNIT/ML 100 UNIT/ML
500 SOLUTION INTRAVENOUS AS NEEDED
Status: DISCONTINUED | OUTPATIENT
Start: 2023-06-06 | End: 2023-06-06 | Stop reason: HOSPADM

## 2023-06-06 RX ORDER — PROCHLORPERAZINE EDISYLATE 5 MG/ML
10 INJECTION INTRAMUSCULAR; INTRAVENOUS ONCE
Status: COMPLETED | OUTPATIENT
Start: 2023-06-06 | End: 2023-06-06

## 2023-06-06 RX ORDER — SODIUM CHLORIDE 9 MG/ML
250 INJECTION, SOLUTION INTRAVENOUS ONCE
Status: COMPLETED | OUTPATIENT
Start: 2023-06-06 | End: 2023-06-06

## 2023-06-06 RX ADMIN — SODIUM CHLORIDE 250 ML: 9 INJECTION, SOLUTION INTRAVENOUS at 09:52

## 2023-06-06 RX ADMIN — DEXAMETHASONE SODIUM PHOSPHATE 12 MG: 10 INJECTION, SOLUTION INTRAMUSCULAR; INTRAVENOUS at 09:53

## 2023-06-06 RX ADMIN — Medication 500 UNITS: at 10:47

## 2023-06-06 RX ADMIN — SODIUM CHLORIDE 1700 MG: 900 INJECTION, SOLUTION INTRAVENOUS at 10:14

## 2023-06-06 RX ADMIN — PROCHLORPERAZINE EDISYLATE 10 MG: 5 INJECTION, SOLUTION INTRAMUSCULAR; INTRAVENOUS at 09:52

## 2023-06-06 RX ADMIN — Medication 10 ML: at 10:47

## 2023-06-06 NOTE — PROGRESS NOTES
Anticoagulation Clinic Progress Note    Patient's visit was held in chemo infusion room today.    Anticoagulation Summary  As of 2023      INR goal:  2.0-3.0   TTR:  25.4 % (2.7 wk)   INR used for dosin.70 (2023)   Warfarin maintenance plan:  4 mg (4 mg x 1) every Mon, Wed, Fri; 2 mg (4 mg x 0.5) all other days; Starting 2023   Weekly warfarin total:  20 mg   No change documented:  Marta Ferguson RPH   Plan last modified:  Marta Ferguson RPH (2023)   Next INR check:  2023   Priority:  High   Target end date:  Indefinite    Indications    Acute deep vein thrombosis (DVT) of left lower extremity  unspecified vein [I82.402]                 Anticoagulation Episode Summary       INR check location:      Preferred lab:      Send INR reminders to:  [facility] CBC ANTICOAG POOL    Comments:  [facility] lab/in clinic New to warf May 2023          Anticoagulation Care Providers       Provider Role Specialty Phone number    Salma Snell MD Referring Hematology and Oncology [phone]            Clinic Interview:  Patient Findings     Negatives:  Signs/symptoms of thrombosis, Signs/symptoms of bleeding,   Laboratory test error suspected, Change in health, Change in alcohol use,   Change in activity, Upcoming invasive procedure, Emergency department   visit, Upcoming dental procedure, Missed doses, Extra doses, Change in   medications, Change in diet/appetite, Hospital admission, Bruising, Other   complaints      Clinical Outcomes     Negatives:  Major bleeding event, Thromboembolic event,   Anticoagulation-related hospital admission, Anticoagulation-related ED   visit, Anticoagulation-related fatality        INR History:      Latest Ref Rng & Units 2023     9:50 AM 2023    10:15 AM 2023    11:20 AM 2023     9:30 AM 2023     9:53 AM 2023     9:03 AM 2023     9:30 AM   Anticoagulation Monitoring   INR   3.70  3.10   2.70   INR Date   2023   INR  Goal   2.0-3.0  2.0-3.0   2.0-3.0   Trend   Same  Down   Same   Last Week Total   24 mg  20 mg   18 mg   Next Week Total   20 mg  20 mg   20 mg   Sun   -  2 mg   2 mg   Mon   -  4 mg   4 mg   Tue   Hold (5/30)  -   2 mg   Wed   2 mg (5/31)  -   4 mg   Thu   4 mg  -   2 mg   Fri   -  4 mg   4 mg   Sat   -  2 mg   2 mg   Historical INR 0.90 - 1.10 3.40   3.70   3.10  2.70     Visit Report   Report Report   Report Report       Plan:  1. INR is Therapeutic today- see above in Anticoagulation Summary.  Will instruct Holli GENAO Marissajanice to Continue their warfarin regimen- see above in Anticoagulation Summary.  2. Follow up in 1 week  3. Verbal and written information provided. Spouse expresses understanding and has no further questions at this time.    Marta Ferguson [facility]

## 2023-06-06 NOTE — PROGRESS NOTES
[facility] OUTPATIENT PROGRESS NOTE        CHIEF COMPLAINT/REASON FOR VISIT:  Metastatic non-small cell lung cancer  Bilateral breast cancer    Interval history:  Marcelle presents to the clinic today for follow-up and anticipated cycle 1 day 8 Gemzar.  She is accompanied by her  Jelani today.  Patient does report she is not feeling well and has been nauseated this morning.  Her nausea started on their way in this morning to her appointment.  She has not taken any antiemetics at this time.  She reports feeling exhausted and really does not want to be here today.  Jelani does report that they had lost power this weekend but that has finally returned.  Her lower extremity edema remains.  Patient reports she has not been elevating her feet like she should.  Patient does mention whether continuing with treatment is really the best option for her.  She is contemplating whether feeling poorly from treatment or should she stop treatment.  Patient reported initially that she did not even want to proceed with treatment today but after further discussion she was agreeable.  We will plan to give her Compazine in the back to help with her nausea and proceed.  We did have a brief discussion today that she needs to discuss and think about what is most important to her in regards to her quality of life and having that discussion with her  to help guide her decision towards treatment. No other concerns noted at this time.    Oncologic history  Ms. 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     3/1/2021-bilateral screening mammogram-microcalcifications seen in the posterior one third of the lateral aspect of the right breast.  Area of focal asymmetry seen in the middle one third of the upper inner quadrant of the left breast.     3/1/2021-DEXA scan-T score of -2.2 on the lumbar spine and T score of -2.3 in the left hip and T  score of -1.9 in the right hip.  Findings consistent with osteopenia     3/23/2021-screening lung CT-there is a 10 x 11 mm solid nodule in the left upper lobe.  Enlarged AP window lymph node measuring 14 x 10 mm.  Suggestion of a possible 9 mm left hilar lymph node.  Heavy coronary artery calcification.     3/23/2021-diagnostic mammogram bilateral-cluster of microcalcifications in the middle third lateral aspect of the right breast.  Left breast demonstrates persistence of the area of focal asymmetry in the region.     Ultrasound-left breast ultrasound at 10 o'clock position, 6 cm from the nipple there is a 0.4 cm irregular hypoechoic lesion.  Stereotactic biopsy of the right breast calcifications recommended.  Ultrasound-guided biopsy of the left breast lesion recommended     4/7/2021-right breast stereotactic biopsy and left breast ultrasound-guided biopsy  Pathology  Right breast-invasive ductal carcinoma, grade 2, lymphovascular invasion present, ER +99% strong, WI +80% moderate, HER-2 negative, Ki-67 12%  Left breast upper inner quadrant 10 o'clock position biopsy, invasive ductal carcinoma, grade   2, ER +99% strong, WI +40% strong, HER-2 2+ on immunohistochemistry, nonamplified on FISH Ki-67 10%     4/14/2021-PET/CT-FDG avid aortopulmonary window lymph node measures 0.9 cm with an SUV of 7.5.  FDG avid left hilar lymph node measures 1 cm with an SUV of 17.2.  Irregular soft tissue density in the right breast measuring 3.7 x 3.8 cm is favored to be secondary to the recent breast biopsy.  1.1 cm pulmonary nodule within the left upper lobe with SUV 9.7 .  Sub-6 mm pulmonary nodules are present in the right lower lobe.  No evidence of lymphadenopathy or metastatic disease in the abdomen.  Focal area of FDG uptake within the central canal posterior to T11-T12 displaced demonstrating an SUV of 5.5 thought to be reactive however MRI is recommended for further evaluation.     She was seen by Dr. Molina who initially  planned for breast surgery however  due to the PET abnormalities she has been referred to Dr. Kerr who plans to do a wound on 4/30/2021.  Dr. Molina's and Dr. Kerr's notes reviewed.     Patient denies any family history of breast cancer.  Her mother had lymphoma.  Maternal grandmother had colon cancer.  Prior to that screening mammogram she did not have any palpable abnormalities of either breast.     She has been a heavy smoker for about 40 years and smoked 2 packs/day.  Denies any recent weight changes, new bone pains, cough, abdominal pain nausea vomiting constipation or diarrhea.  She does have anxiety and has been on several medications.  She has recently been started on Xanax to help with the mood and also with insomnia.     Patient had a video-assisted thoracoscopy and mediastinal lymphadenectomy on 4/30/2021.  L5-L6 lymph nodes were biopsied however the small pulmonary nodule in the left upper lobe was not difficult to find.  Pathology showed moderately differentiated adenocarcinoma which is CK7 and TTF-1 positive.  Consistent with lung primary.  Ki-67 85%.     5/14/2021-MRI of the brain-minimal chronic small vessel ischemic change in the white matter.  Otherwise negative MRI.     5/20/2021-bilateral axillary ultrasound-no evidence of axillary lymphadenopathy in either axilla.  Normal-appearing bilateral axillary lymph nodes visualized.     Cycle 1 cisplatin and Alimta on 5/25/2021.     Cycle 2 cisplatin Alimta 6/15/2021     Cycle 3 cisplatin Alimta 7/7/2021     Completed radiation on 7/12/2021     Port was nonfunctional hence a port study was performed which showed that the port was backed up into the innominate vein and also there was a nonocclusive thrombus at the end of the catheter extending into the superior vena cava.  She was started on anticoagulation with Eliquis.  It was recommended for port revision of the intention to keep the port.     PET/CT 8/5/2021-images independently reviewed and  interpreted by me-decrease in size and FDG uptake of the left upper lobe pulmonary nodule as well as mediastinal and left hilar lymphadenopathy representing response to treatment.  Sub-6 mm pulmonary nodule in the left upper lobe new since 4/14/2021.  This could be related to radiation however follow-up CT in 3 months recommended.  Decrease in size of the irregular masslike tissue in the right breast which is postbiopsy hematoma.  This is not PET avid.  New segmental left eighth rib fractures healing.  A new band of sclerosis of the left seventh rib which favored to represent healing nondisplaced fracture.  Follow-up CT recommended.  focal uptake in the duodenum first and second portions.  Could be related to duodenitis.  Indeterminate lesion in the L1 vertebral body not well evaluated on PET/CT.     10/1/2021-MRI of the lumbar spine.  Lesion in the left posterior body of L1 measures 14 x 14 x 12 mm and previously 5 x 5 x 6 mm.  The interval enlargement strongly suggest that it is metastatic lesion.  No additional osseous metastasis noted.   Other chronic changes noted.     10/14/2021-biopsy of the lumbar spine lesion-pathology consistent with poorly differentiated carcinoma.  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     10/26/2021-brain MRI-no evidence of metastatic disease.     10/26/2021-bilateral diagnostic mammogram and ultrasound  Scattered fibroglandular density in both breast.  Postbiopsy hematoma with internal biopsy clip in the upper outer quadrant of the right breast, 8 cm from the nipple.  The hematoma size is decreased to 2.9 cm from 3.6 cm earlier.     Left breast-parenchymal density measuring 9 x 10 mm has markedly decreased.  Few adjacent calcifications which are unchanged.  No new abnormality in the left breast.  At 10:00 region there is some minimal adjacent hypoechoic texture which is difficult to measure but  appears smaller since the previous exam.     10/28/2021-PET/CT  New L1 and L2 lytic bone metastasis annually intensely hypermetabolic focus at the left adrenal gland likely represents metastatic disease as well.  Slight decrease in size of the 0.9 x 0.7 cm left upper lobe pulmonary nodule.  There is hypermetabolic activity related to radiation pneumonitis.  The remainder of the nodule is photopenic.  Uncertain etiology of the more intense activity along the right lateral peripheral margin of the 2.6 cm postbiopsy density of the right breast.     She completed radiation to to the lumbar spine on 11/19/2021 11/22/2021-cycle 1 Alimta and Keytruda     3/23/2022-bilateral diagnostic mammogram and ultrasound  Complete resolution of the microcalcifications in the upper outer quadrant of the right breast and decrease in size of the postbiopsy hematoma.  No suspicious abnormalities in the right breast.  Benign-appearing calcifications at the site of malignancy in the left breast upper inner quadrant.  No other suspicious findings.     4/11/2022 PET/CT-there is new groundglass opacities in the left upper lobe which are most likely postradiation changes.  Other groundglass opacities in the left lung as well as the right lung remained stable.  Increased nodularity of the left adrenal gland with an SUV of 5.6, previously 3.5.    Increased uptake in the L1 vertebra in the right posterior aspect with an SUV of 3.6.  Left L1 sclerosis increased     Due to this the case was discussed in multidisciplinary conference.  The disease progression was significant in the left adrenal gland as well as the L1 vertebra.  Since there were only 2 areas of disease progression it has been decided to proceed with radiation to these 2 spots and continue on Keytruda and Alimta.     Patient also has had worsening of her kidney function.  We initially held treatment as of 5/31/2022 and creatinine bumped up to 2.0, BUN 23.  We then resumed therapy  6/11/2022 with Keytruda alone.  Patient was referred to nephrology.     6/6/2022-MRI of the spine-diffuse marrow replacement in the L1 vertebral body and inferior endplate concavity.  Suspicious for metastatic disease with pathological fracture in the inferior endplate.  Also diffuse signal abnormality in the L2 lamina on the left suspicious for additional metastatic disease.  Abnormality of the first sacral segment suspicious for metastatic disease.  Left adrenal gland appears enlarged.  30 to 40% height loss and L2 vertebral body suggestive of a subacute compression fracture.  Degenerative changes.     7/18/2022-PET/CT  Multiple hypermetabolic osseous lesions with index lesions which have either increased in degree of FDG uptake or newly hypermetabolic lesions representative of metastatic disease and progression of disease.  Possible FDG avid lesion in the left femoral diaphysis however these are incompletely visualized and in the area of artifactual FDG uptake.  MRI of the left femur recommended for further evaluation.  Increasing FDG uptake of the left adrenal gland.  Focal left hilar hypermetabolic him corresponds to the lymph node which has minimally increased in size compared to April 2022.  New sub-6 mm pulmonary nodules.     7/26/2022-cycle 1 of Taxotere     Patient was admitted to the hospital 8/3/2022 discharged on 8/9/2022.  She was admitted for strokelike symptoms and confusion.  The confusion was thought to be secondary to polypharmacy and probably febrile neutropenia.  She was treated with antibiotics.  Mental status improved subsequently.  MRI of the cervical thoracic and lumbar spine was performed on 8/4/2022.  Images independently reviewed by me.  Multiple metastatic lesions in the spine noted.  There was a lesion on the CT which was concerning.  There is also a sacral Lesion measuring up to 5 cm in transverse dimension on the right.  Patient was symptomatic with pain on the right side of the  sacrum.  Radiation oncology recommended radiation to the cervical spine as well as sacrum.  CT head without any obvious abnormalities.     Completed radiation to the cervical spine and sacrum on 8/12/2022    She was admitted to the hospital between 9/23/2022 to 10/12/2022.  She was admitted for poorly controlled pain as well as encephalopathy and multiple falls.  She was diagnosed with a UTI and treated with IV Rocephin.  Pain medications changed and also additional radiation to the right sacrum was performed.  3000 cGy in 10 fractions was administered.  She was discharged on oxycodone 15 mg every 4 hours as needed for pain and OxyContin twice daily.  She was recommended to go to a subacute rehab however she preferred to be discharged home with home physical therapy and Occupational Therapy    9/24/2022-MRI of the pelvis showed osseous metastatic disease in the sacrum worst within the right and probable pathological fractures bilaterally.    10/4/2022 CT head without any evidence of metastatic disease or acute abnormalities.    10/25/2022-PET/CT-resolution of hypermetabolic activity in the upper lobe and left hilum.  Previously noted new tiny pulmonary nodules have resolved.  Resolution of hypermetabolic activity in the left adrenal gland.  Near complete resolution of hypermetabolic activity in the skeletal metastasis particularly the cervical spine, pelvic bones and proximal left femur.  Maximal SUV at the right sacral metastasis is 2.7 and previously it was 8.9.  Tiny focus of hypermetabolic activity at the GE junction with a maximal SUV of 4.7.  No definite thickening or abnormality noted on the CT.    11/15/2022-lumbar puncture with negative cytology of the CSF.    11/21/2022-MRI of the brain with contrast and MRI of the cervical spine with contrast without any evidence of metastatic disease.    1/12/2023-PET/CT  Hypermetabolic mediastinal lymphadenopathy and multiple liver lesions which are hypermetabolic  suggestive of metastasis.  New tiny left pleural effusion is noted.  Progression of bilateral sacral fractures and right L5 transverse process fracture.  There is also an acute/subacute L1 fracture.  Lumbar spine x-rays have been recommended for evaluation of the vertebral height or lumbar MRI to be compared to the priors.    1/23/2023-MRI of the brain  At least 9 separate tiny 2 to 6 mm enhancing lesions in the brain compatible with multiple new brain metastasis.  There has been interval development of 3 separate foci of encephalomalacia concerning for chronic infarcts but these are new since August 2022.  Stable osseous metastasis at C2.    3/27/2023-PET/CT  Significant interval improvement of the previously seen hypermetabolic mediastinal lymphadenopathy as well as hepatic lesions.  Focal area of uptake noted in the gallbladder fundus raising concern for cholecystitis.  0.6 cm pulmonary nodule in the left upper lobe is new and follow-up CT in 3 months recommended.  New bilateral groundglass opacities in lower lobes suggestive of atypical pneumonia.  Long segment mild to moderate uptake within the esophagus suggestive of esophagitis.    3/28/2023-MRI of the brain  There are at least 9 separate 2 to 6 mm enhancing lesions in the superficial cortex of the brain which have shown a slight interval decrease in size and also decreased enhancement noted.  No new enhancing lesions noted.  Concern for focal areas of leptomeningeal seeding.  Unchanged from previous MRI tiny old lacunar infarcts in the left MCA territory.     MRI of the brain 4/28/2023-multiple new strokes in the MCA territory.  1 to 2 mm increase in size of the previously known metastatic lesions    Admitted to the hospital and evaluated by neurology and treatment has been changed to Lovenox.  2D echocardiogram without any evidence of blood clots.    Vitals:    06/06/23 0909   BP: 136/93   Pulse: 100   Resp: 18   Temp: 97.5 °F (36.4 °C)   TempSrc: Temporal    SpO2: 95%   Weight: Comment: unable to stand   PainSc:   8   PainLoc: Abdomen         PHYSICAL EXAMINATION:    General: Oriented to person, place, and time.  Ill-appearing.  Fatigued.  Pale.  HEENT: EOMI.    Chest/Lungs: Clear to auscultation bilaterally. Right chest mediport in place  Heart: RRR, sinus tachycardia  Extremities: Bilateral lower extremity edema, 2+ pitting edema bilateral feet  Extensive bruising noted on the upper extremities.        DIAGNOSTIC DATA:  Results Review:        Results from last 7 days   Lab Units 06/06/23  0903   WBC 10*3/mm3 4.37   HEMOGLOBIN g/dL 9.3*   HEMATOCRIT % 30.9*   PLATELETS 10*3/mm3 117*     Lab Results   Component Value Date    NEUTROABS 3.35 06/06/2023     Results from last 7 days   Lab Units 05/30/23  1124   SODIUM mmol/L 141   POTASSIUM mmol/L 4.0   CHLORIDE mmol/L 103   CO2 mmol/L 25.4   BUN mg/dL 27*   CREATININE mg/dL 0.96   GLUCOSE mg/dL 108   CALCIUM mg/dL 8.9     Results from last 7 days   Lab Units 06/06/23  0903 06/02/23  0953 05/30/23  1120   INR  2.70* 3.10* 3.70*             IMAGING:    Duplex Venous Lower Extremity - Left CAR (03/21/2023 10:25)    PET/CT 1/12/2023 and MRI of the brain 1/23/2023-images independently reviewed and interpreted by me.    ASSESSMENT:  This is a 57 y.o. female with:     *Metastatic non-small cell lung cancer  She was initially diagnosed with stage III (T1N2M0) disease.    She received initial therapy with cisplatin and Alimta concurrent with radiation therapy beginning 5/25/2021.    There were indeterminate findings in the lumbar spine at L1 and it was recommended to monitor this over time.    Patient completed 3 cycles cisplatin and Alimta 7/7/2021 and completed radiation therapy 7/12/2021.    PET scan 8/6/2021 showed good response to treatment.    MRI lumbar spine 10/1/2021 with increase in size of the L1 lesion.    Biopsy of the L1 lesion on 10/14/2022 confirmed metastatic adenocarcinoma of lung origin, PD-L1 TPS 0.    PET  scan 10/26/2021 with involvement of left adrenal and L1/L2 only.    Patient received radiation therapy to L1/L2 on 11/19/2021.  She also received radiation to the left adrenal gland.    She initiated further systemic therapy on 11/22/2021 with Keytruda and Alimta.   Follow-up PET scan 1/18/2022 with decrease in small left upper lobe pulmonary nodule, resolution of activity at L1/L2, no new sites of disease.    Guardant 360 CT DNA level was monitored and was decreasing.    PET scan 4/11/2022 with progression in left adrenal and L1.    Maintained systemic therapy with Keytruda and Alimta and received additional radiation to left adrenal and L1.  Alimta however held since 5/10/2022 due to renal dysfunction.  Radiation completed to lumbar spine 7/14/2022.    PET scan 7/18/2022 with multiple areas of progression of the spine and right sacrum.    Guardant 360 analysis 7/20/2022 with no actionable mutations.    Change in therapy to single agent palliative Taxotere initiated 7/26/2022.    Altered mental status and febrile neutropenia requiring hospitalization 8/2 - 8/9/2022.  During that admission, MRI brain, cervical, thoracic, lumbar spine performed 8/4/2022 in addition to CT cervical spine 8/5/2022.  There was evidence of C2 metastatic lesion with some dural enhancement, compression deformity at T7 with mild edema suggesting acute to subacute fracture, 5 mm T12 spinous process metastasis, multifocal disease in the lumbar spine and sacrum, largest involving sacral ala to the right 5 cm in transverse dimension.  Loss of height at L2 25%.  Patient received cycle 3 Taxotere 9/6/2022 with Neulasta support.    She has been continuing as well on Xgeva every 4 weeks (last received 9/6/2022).  Patient did undergo MRI lumbar spine and pelvis on 9/24/2022.  MRI pelvis showed bilateral sacral fractures with marrow infiltration related to metastasis more prominent on the right.  Lesion on the right 3.5 x 4.1 x 4.3 cm.  Also probable  metastasis along posterior acetabulum on the right 1.5 cm.  MRI lumbar spine with stable L1 metastasis, new edema endplate T12 possibly stress reaction versus developing new metastasis, lesion at T12 spinous process unchanged.   Radiation oncology consulted with plans for palliative treatment of the right sacral metastasis.  Treatment initiated 9/28/2022  Completed radiation to the right sacral metastasis on 10/11/2022.  PET/CT 10/25/2022 without any metabolic uptake in the lung or the skeletal metastasis.  This is indicative of a positive response to Taxotere.  Pain persistent sitting and walking position and not present when she is laying down.  Patient is still very active and performance status is very poor.  Due to this reason chemotherapy will be continued to be held.  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  Due to this reason an MRI of the brain was obtained but unfortunately this was performed without contrast.  Reviewed the MRI and no evidence of metastatic disease.  11/15/2022 lumbar puncture shows no evidence of malignancy in the CSF, elevated protein at 65.2, glucose normal at 58, culture no growth in 3 days  Repeat MRI of the brain with contrast 11/21/2022 does not show any obvious abnormalities.  There is no enhancement of the CSF.  MRI of the cervical spine with no abnormalities.  Patient unable to undergo MRI of the thoracic and lumbar spine due to mental status and unable to being in 1 position as well as with incontinence.  1/4/2023-mental status has improved significantly.  Patient reports decreased oral intake and nausea and vomiting.  She is also reporting abdominal discomfort.  1/12/2023-PET/CT with mediastinal lymphadenopathy as well as multiple liver lesions suggestive of disease progression.  1/23/2023-MRI of the brain with at least 9 different foci of small mets measuring up to 6 mm.  There is also areas of  encephalomalacia concerning for chronic infarcts.    Resumed Taxotere 1/31/2023.  Dose reduced to 60 mg per metered square.  3/27/2023-PET/CT shows significant response in the chest as well as the hepatic lesions.  3/28/2023-MRI of the brain-improvement in the metastatic disease to the brain with decrease in the size as well as decrease in enhancement noted.  4/25/2023-cycle 8 of Taxotere.  Patient was admitted to the hospital on 4/28/2023 due to multiple new strokes.  Scheduled for chemotherapy next week.  MRI is  MRI of the brain 4/28/2023 shows small increase in size of the metastatic lesions in the brain by 1 to 2 mm.  Remains with extremely poor functional status.  She does not have a good quality of life.  She is requesting that her pain be adequately controlled.  Jelani her  has been trying to hold back on some of the pain medications as he is concerned about the previous mental status changes that she had experienced.  She reports severe neuropathy and not willing to proceed with chemotherapy today.  I am definitely concerned if we continue with the Taxotere it may result in worsening neuropathy.  [id number] performed in July 2022 did not show any significant targetable mutations.  Gemcitabine to start 5/30/2023, labs reviewed and stable to proceed.  Gemcitabine is weekly, 3 weeks on and 1 week off.  Discussed with Dr. العلي regarding radiation to the brain due to slight increase in size of the lesions.  She felt like we would hold off on radiation for the time being due to her poor quality of life and see if good disease of control is obtained with gemcitabine and if quality of life improves then could consider radiation down the line.  6/6/2023: Labs reviewed.  We will proceed with Gemzar cycle 1 day 8 today.  Patient is to receive 10 mg of Compazine for nausea.     *History of bilateral stage I breast cancer  Patient with bilateral breast cancer, both stage I ([id number]), grade 2, ER/WY positive  and HER2/melinda negative with low Ki-67.    Patient initiated adjnuvant letrozole in May 2021.  Letrozole discontinued as she is on Taxotere  No changes    *Mental status changes  Patient's mental status is back at baseline.  Significant improvement  Currently off all psychotropic medications.  MRI of the brain 3/28/2023 with slight improvement in the 9 lesions noted previously  Mental status remains at baseline  4/28/2023-MRI shows new strokes  Mental status at baseline     *Cancer related pain  Patient has been receiving Duragesic patch 50 mcg every 72 hours in addition to oxycodone 15 mg every 4 hours for breakthrough pain.  Duragesic dose had been escalated recently in the outpatient setting due to worsening pain  On admission 9/23/2022, Duragesic patch increased to 75 mcg daily, added Decadron 4 mg IV every 6 hours with continuation of oxycodone 15 mg every 4 hours as needed for breakthrough pain in addition of Dilaudid 1 mg every 2 hours as needed for breakthrough pain.  Patient with increasing side effects from narcotics, Duragesic decreased on 9/25/2022 from 75 down to 50 mcg patch every 72 hours.  Dexamethasone dose decreased to 4 mg p.o. every 12 hours on 9/27/2022  Initiated palliative radiation to the right sacrum on 9/28/2022  Patient became confused after receiving Dilaudid.  Patient and family asked to discontinue Dilaudid.  Duragesic patch dose was increased to 75 mcg/h on 9/30/2022. Subsequent decrease to 50 mcg due to confusion  Gabapentin was discontinued due to the sedating effect.  Oxycodone is being used for breakthrough pain.  10/3 transition to sustained release oxycodone 40 mg bid and fentanyl patch stopped  Poorly controlled, pain particularly worse in sitting and standing position.  Unsure if extends is contributing to mental status changes as previously patient had been very sensitive to pain meds.  Due to this reason we will discontinue the Xtampza ER and switch to OxyContin 20 mg twice  daily.  Mental status returned to baseline on holding the pain medications.  However patient now reports significant pain in her back as well as her feet which is very poorly controlled.  She is receiving oxycodone 20 mg for pain control.  Patient also expressed that she does not have a good quality of life due to an inadequate pain control at this time.  Referral to palliative care has been placed.  She was started on 100 mg of gabapentin at her last visit however her  notes that she is increasingly confused.  Discussed changing gabapentin to every other day  6/6/2023: Pain is stable      *Anxiety/depression  Currently all medications have been discontinued due to mental status changes  Continue Xanax as needed  Tearful today due to inadequate pain control.  Continue oxycodone  Referred to palliative care  Gabapentin every other day     *Insomnia  She has been receiving Xanax as needed  Continue Xanax currently  Improved    *Anemia  Hemoglobin 7.6 3/14/2023.  B12, ferritin, iron studies, and folate unremarkable.  1 unit of PRBC given 3/16/2023  3/21/2023 hemoglobin 8.4  3/14/2023-iron studies reviewed and iron saturation 24%, TIBC 329, ferritin 189  Vitamin B12 borderline at 376, folic acid normal at greater than 20  Hemoglobin lower at 8.3 today.  INR 3.7  Decrease Coumadin to 1 mg daily  6/6/2023: Hemoglobin 9.3     *Peripheral neuropathy  Patient was on gabapentin 300 mg twice daily.  Gabapentin was discontinued on 9/30/2022.  Neuropathy stable  Patient noting heaviness to both lower extremities when seen on 3/21/2023, possible worsening neuropathy secondary to Taxotere?  Reevaluate further at next visit once anticoagulant changed and possibly acute DVT pain eliminated.  Continues to experience severe neuropathy.  We will discontinue Taxotere as her symptoms are very poorly controlled at this time and I am worried that Taxotere would worsen the neuropathy.  Change gabapentin 200 mg every other day      *Nausea vomiting and abdominal discomfort  Continues to experience vomiting.  Abdominal discomfort has resolved  PET/CT with liver metastasis.  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  Continue Compazine as well as Zofran as needed  Nausea and vomiting have pretty much resolved.  Noted to have area of focal uptake on the PET/CT over the gallbladder which is suggestive of cholecystitis.  6/6/2023: Patient reports nausea that started on her way into her appointment today.  We will give her Compazine 10 mg in clinic.    *Mouth sores  Not an issue today    *GI prophylaxis  Continue omeprazole daily  No changes  PET/CT suggestive of esophagitis    *Deconditioning  Secondary to significant lower extremity edema, severe poorly controlled back pain.  Offered physical therapy and Occupational Therapy however patient refused  She plans to try to do some exercises at home by herself    *Hypokalemia  Potassium normal at 3.8    *Brain metastasis  Small increase in size of the metastatic lesions by 1 to 2 mm  Discussed with Dr. العلي who recommends holding off on radiation at this time due to poor quality of life  We will reassess with MRI down the line    *Abnormal LFTs  LFTs normal today    *Acute left soleal deep vein thrombosis 3/21/2023  Patient seen 3/21/2023 after having a left lower extremity Doppler due to reports of severe left lower extremity pain with redness and some swelling noted.  Preliminary results positive for acute soleal DVT.  Patient continues on Eliquis 5 mg twice daily and denies missing doses.  Case discussed with Dr. Snell and we will obtain additional lab work including beta-2 glycoprotein, lupus anticoagulant, and anticardiolipin antibody.  We will proceed with switching her Eliquis to Xarelto 15 mg twice daily x21 days then 20 mg daily thereafter.  She was given samples for the loading dose and will need a prescription at a later date for the 20 mg once daily.  Hypercoagulability  work-up particularly antiphospholipid syndrome labs have been reviewed and negative  Xarelto discontinued due to multiple new strokes  Currently on Lovenox.  Patient is having significant trouble tolerating Lovenox.  INR noted to be 3.8 on Coumadin  Pharmacy managing the INR  Now with bilateral lower extremity edema right greater than left.  Discussed obtaining a repeat Doppler however they would like to wait for another week    *Excessive bleeding  She had a recent fall injuring her right forearm resulting in excessive bleeding.  She is also on therapeutic Lovenox which is making the significantly worse.  Patient is also complaining of trouble with Lovenox injections resulting in large bruises on her abdomen.  Continue Coumadin, INR 3.8  6/6/2023: Continue Coumadin.  INR 2.7    *Leukocytosis  Likely reactive  Unchanged  Continue to monitor  6/6/2023: Resolved.  WBC 4.37    *bilateral lower extremity edema  Secondary to venous stasis  Recommend compression and elevation of the lower extremities  She has not used compression.  She has been elevating the lower extremities some.  Start Lasix 20 mg daily  6/6/2023: Continue Lasix 20 mg daily.  Patient encouraged to elevate her lower extremities and wear compression stockings.        RECOMMENDATIONS:    Coumadin dosing by pharmacy, INR is 2.7.   Compression socks for lower extremities  Proceed with gemcitabine C1 D8  Continue gabapentin every other day  Referred to palliative care  Continue Xanax and oxycodone  Continue Lasix 20 mg daily  Follow-up in 1 week with Dr. Snell, labs and Amrit C1D15    KORI Johnson  [facility] OUTPATIENT PROGRESS NOTE

## 2023-06-13 ENCOUNTER — TELEPHONE (OUTPATIENT)
Dept: ONCOLOGY | Facility: CLINIC | Age: 58
End: 2023-06-13

## 2023-06-13 ENCOUNTER — ANTICOAGULATION VISIT (OUTPATIENT)
Dept: ONCOLOGY | Facility: HOSPITAL | Age: 58
End: 2023-06-13
Payer: COMMERCIAL

## 2023-06-13 ENCOUNTER — HOSPITAL ENCOUNTER (OUTPATIENT)
Dept: CARDIOLOGY | Facility: HOSPITAL | Age: 58
Discharge: HOME | End: 2023-06-13
Admitting: INTERNAL MEDICINE
Payer: COMMERCIAL

## 2023-06-13 ENCOUNTER — INFUSION (OUTPATIENT)
Dept: ONCOLOGY | Facility: HOSPITAL | Age: 58
End: 2023-06-13
Payer: COMMERCIAL

## 2023-06-13 ENCOUNTER — LAB (OUTPATIENT)
Dept: LAB | Facility: HOSPITAL | Age: 58
End: 2023-06-13
Payer: COMMERCIAL

## 2023-06-13 ENCOUNTER — OFFICE VISIT (OUTPATIENT)
Dept: ONCOLOGY | Facility: CLINIC | Age: 58
End: 2023-06-13
Payer: COMMERCIAL

## 2023-06-13 VITALS
TEMPERATURE: 97.5 F | BODY MASS INDEX: 28.88 KG/M2 | DIASTOLIC BLOOD PRESSURE: 90 MMHG | RESPIRATION RATE: 18 BRPM | SYSTOLIC BLOOD PRESSURE: 135 MMHG | HEART RATE: 108 BPM | WEIGHT: 163 LBS | HEIGHT: 63 IN

## 2023-06-13 DIAGNOSIS — I82.402 ACUTE DEEP VEIN THROMBOSIS (DVT) OF LEFT LOWER EXTREMITY, UNSPECIFIED VEIN: Primary | ICD-10-CM

## 2023-06-13 DIAGNOSIS — C34.90 NON-SMALL CELL LUNG CANCER METASTATIC TO BONE: ICD-10-CM

## 2023-06-13 DIAGNOSIS — C79.51 NON-SMALL CELL LUNG CANCER METASTATIC TO BONE: ICD-10-CM

## 2023-06-13 DIAGNOSIS — M79.89 SWELLING OF RIGHT LOWER EXTREMITY: ICD-10-CM

## 2023-06-13 DIAGNOSIS — C79.51 NON-SMALL CELL LUNG CANCER METASTATIC TO BONE: Primary | ICD-10-CM

## 2023-06-13 DIAGNOSIS — I82.4Y9 DEEP VEIN THROMBOSIS (DVT) OF PROXIMAL LOWER EXTREMITY, UNSPECIFIED CHRONICITY, UNSPECIFIED LATERALITY: Primary | ICD-10-CM

## 2023-06-13 DIAGNOSIS — C34.90 NON-SMALL CELL LUNG CANCER METASTATIC TO BONE: Primary | ICD-10-CM

## 2023-06-13 LAB
ALBUMIN SERPL-MCNC: 3.4 G/DL (ref 3.5–5.2)
ALBUMIN/GLOB SERPL: 1.3 G/DL (ref 1.1–2.4)
ALP SERPL-CCNC: 88 U/L (ref 38–116)
ALT SERPL W P-5'-P-CCNC: 16 U/L (ref 0–33)
ANION GAP SERPL CALCULATED.3IONS-SCNC: 11.6 MMOL/L (ref 5–15)
AST SERPL-CCNC: 14 U/L (ref 0–32)
BASOPHILS # BLD AUTO: 0.01 10*3/MM3 (ref 0–0.2)
BASOPHILS NFR BLD AUTO: 0.3 % (ref 0–1.5)
BH CV LOWER VASCULAR LEFT COMMON FEMORAL AUGMENT: NORMAL
BH CV LOWER VASCULAR LEFT COMMON FEMORAL COMPETENT: NORMAL
BH CV LOWER VASCULAR LEFT COMMON FEMORAL COMPRESS: NORMAL
BH CV LOWER VASCULAR LEFT COMMON FEMORAL PHASIC: NORMAL
BH CV LOWER VASCULAR LEFT COMMON FEMORAL SPONT: NORMAL
BH CV LOWER VASCULAR RIGHT COMMON FEMORAL AUGMENT: NORMAL
BH CV LOWER VASCULAR RIGHT COMMON FEMORAL COMPETENT: NORMAL
BH CV LOWER VASCULAR RIGHT COMMON FEMORAL COMPRESS: NORMAL
BH CV LOWER VASCULAR RIGHT COMMON FEMORAL PHASIC: NORMAL
BH CV LOWER VASCULAR RIGHT COMMON FEMORAL SPONT: NORMAL
BH CV LOWER VASCULAR RIGHT DISTAL FEMORAL COMPRESS: NORMAL
BH CV LOWER VASCULAR RIGHT GASTRONEMIUS COMPRESS: NORMAL
BH CV LOWER VASCULAR RIGHT GREATER SAPH AK COMPRESS: NORMAL
BH CV LOWER VASCULAR RIGHT GREATER SAPH BK COMPRESS: NORMAL
BH CV LOWER VASCULAR RIGHT LESSER SAPH COMPRESS: NORMAL
BH CV LOWER VASCULAR RIGHT MID FEMORAL AUGMENT: NORMAL
BH CV LOWER VASCULAR RIGHT MID FEMORAL COMPETENT: NORMAL
BH CV LOWER VASCULAR RIGHT MID FEMORAL COMPRESS: NORMAL
BH CV LOWER VASCULAR RIGHT MID FEMORAL PHASIC: NORMAL
BH CV LOWER VASCULAR RIGHT MID FEMORAL SPONT: NORMAL
BH CV LOWER VASCULAR RIGHT PERONEAL COMPRESS: NORMAL
BH CV LOWER VASCULAR RIGHT POPLITEAL AUGMENT: NORMAL
BH CV LOWER VASCULAR RIGHT POPLITEAL COMPETENT: NORMAL
BH CV LOWER VASCULAR RIGHT POPLITEAL COMPRESS: NORMAL
BH CV LOWER VASCULAR RIGHT POPLITEAL PHASIC: NORMAL
BH CV LOWER VASCULAR RIGHT POPLITEAL SPONT: NORMAL
BH CV LOWER VASCULAR RIGHT POSTERIOR TIBIAL COMPRESS: NORMAL
BH CV LOWER VASCULAR RIGHT PROFUNDA FEMORAL COMPRESS: NORMAL
BH CV LOWER VASCULAR RIGHT PROXIMAL FEMORAL COMPRESS: NORMAL
BH CV LOWER VASCULAR RIGHT SAPHENOFEMORAL JUNCTION COMPRESS: NORMAL
BH CV VAS PRELIMINARY FINDINGS SCRIPTING: 1
BILIRUB SERPL-MCNC: 0.4 MG/DL (ref 0.2–1.2)
BUN SERPL-MCNC: 13 MG/DL (ref 6–20)
BUN/CREAT SERPL: 12.5 (ref 7.3–30)
CALCIUM SPEC-SCNC: 8.3 MG/DL (ref 8.5–10.2)
CHLORIDE SERPL-SCNC: 101 MMOL/L (ref 98–107)
CO2 SERPL-SCNC: 24.4 MMOL/L (ref 22–29)
CREAT SERPL-MCNC: 1.04 MG/DL (ref 0.6–1.1)
DEPRECATED RDW RBC AUTO: 61.1 FL (ref 37–54)
EGFRCR SERPLBLD CKD-EPI 2021: 62 ML/MIN/1.73
EOSINOPHIL # BLD AUTO: 0.01 10*3/MM3 (ref 0–0.4)
EOSINOPHIL NFR BLD AUTO: 0.3 % (ref 0.3–6.2)
ERYTHROCYTE [DISTWIDTH] IN BLOOD BY AUTOMATED COUNT: 17.3 % (ref 12.3–15.4)
GLOBULIN UR ELPH-MCNC: 2.6 GM/DL (ref 1.8–3.5)
GLUCOSE SERPL-MCNC: 146 MG/DL (ref 74–124)
HCT VFR BLD AUTO: 31 % (ref 34–46.6)
HGB BLD-MCNC: 9.2 G/DL (ref 12–15.9)
IMM GRANULOCYTES # BLD AUTO: 0.03 10*3/MM3 (ref 0–0.05)
IMM GRANULOCYTES NFR BLD AUTO: 0.9 % (ref 0–0.5)
INR PPP: 3.2 (ref 0.9–1.1)
LYMPHOCYTES # BLD AUTO: 0.07 10*3/MM3 (ref 0.7–3.1)
LYMPHOCYTES NFR BLD AUTO: 2.2 % (ref 19.6–45.3)
MCH RBC QN AUTO: 28.7 PG (ref 26.6–33)
MCHC RBC AUTO-ENTMCNC: 29.7 G/DL (ref 31.5–35.7)
MCV RBC AUTO: 96.6 FL (ref 79–97)
MONOCYTES # BLD AUTO: 0.1 10*3/MM3 (ref 0.1–0.9)
MONOCYTES NFR BLD AUTO: 3.1 % (ref 5–12)
NEUTROPHILS NFR BLD AUTO: 2.99 10*3/MM3 (ref 1.7–7)
NEUTROPHILS NFR BLD AUTO: 93.2 % (ref 42.7–76)
NRBC BLD AUTO-RTO: 0 /100 WBC (ref 0–0.2)
PLATELET # BLD AUTO: 29 10*3/MM3 (ref 140–450)
PMV BLD AUTO: 11 FL (ref 6–12)
POTASSIUM SERPL-SCNC: 3.3 MMOL/L (ref 3.5–4.7)
PROT SERPL-MCNC: 6 G/DL (ref 6.3–8)
PROTHROMBIN TIME: 38.7 SECONDS (ref 11–13.5)
RBC # BLD AUTO: 3.21 10*6/MM3 (ref 3.77–5.28)
SODIUM SERPL-SCNC: 137 MMOL/L (ref 134–145)
WBC NRBC COR # BLD: 3.21 10*3/MM3 (ref 3.4–10.8)

## 2023-06-13 PROCEDURE — 80053 COMPREHEN METABOLIC PANEL: CPT

## 2023-06-13 PROCEDURE — 85610 PROTHROMBIN TIME: CPT

## 2023-06-13 PROCEDURE — 93971 EXTREMITY STUDY: CPT

## 2023-06-13 PROCEDURE — G0463 HOSPITAL OUTPT CLINIC VISIT: HCPCS

## 2023-06-13 PROCEDURE — 36591 DRAW BLOOD OFF VENOUS DEVICE: CPT

## 2023-06-13 PROCEDURE — 85025 COMPLETE CBC W/AUTO DIFF WBC: CPT

## 2023-06-13 RX ORDER — HEPARIN SODIUM (PORCINE) LOCK FLUSH IV SOLN 100 UNIT/ML 100 UNIT/ML
500 SOLUTION INTRAVENOUS AS NEEDED
Status: CANCELLED | OUTPATIENT
Start: 2023-06-13

## 2023-06-13 RX ORDER — SODIUM CHLORIDE 9 MG/ML
250 INJECTION, SOLUTION INTRAVENOUS AS NEEDED
Status: CANCELLED | OUTPATIENT
Start: 2023-06-13

## 2023-06-13 RX ORDER — SODIUM CHLORIDE 0.9 % (FLUSH) 0.9 %
10 SYRINGE (ML) INJECTION AS NEEDED
Status: CANCELLED | OUTPATIENT
Start: 2023-06-13

## 2023-06-13 NOTE — PROGRESS NOTES
[facility] GROUP OUTPATIENT PROGRESS NOTE        CHIEF COMPLAINT/REASON FOR VISIT:  Metastatic non-small cell lung cancer  Bilateral breast cancer    Interval history:  Marcelle presents to the clinic today for follow-up and scheduled for cycle 1 day 15 of gemcitabine.  The left lower extremity edema has improved since she kept her feet elevated yesterday.  Even the right one had improved some however since this morning the right lower extremity edema has worsened and she is complaining of tightness in the calf and there is also erythema of the right lower extremity.  She repeatedly states that she has no quality of life and she is ready to give up on further treatments.  She also tells me that she does not want to take chemotherapy today and wants to go home and sleep.  She was tearful in the clinic today and expressed that she does not like the fact that she is unable to enjoy going out with her friends and unable to do much at home.  Pain seems to be adequately controlled on oxycodone.  Denies any aches or bleeding or bruising.  She does have some mucositis/Candida infection of the tongue which has improved some.  .    Oncologic history  Ms. 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     3/1/2021-bilateral screening mammogram-microcalcifications seen in the posterior one third of the lateral aspect of the right breast.  Area of focal asymmetry seen in the middle one third of the upper inner quadrant of the left breast.     3/1/2021-DEXA scan-T score of -2.2 on the lumbar spine and T score of -2.3 in the left hip and T score of -1.9 in the right hip.  Findings consistent with osteopenia     3/23/2021-screening lung CT-there is a 10 x 11 mm solid nodule in the left upper lobe.  Enlarged AP window lymph node measuring 14 x 10 mm.  Suggestion of a possible 9 mm left hilar lymph node.  Heavy coronary artery calcification.      3/23/2021-diagnostic mammogram bilateral-cluster of microcalcifications in the middle third lateral aspect of the right breast.  Left breast demonstrates persistence of the area of focal asymmetry in the region.     Ultrasound-left breast ultrasound at 10 o'clock position, 6 cm from the nipple there is a 0.4 cm irregular hypoechoic lesion.  Stereotactic biopsy of the right breast calcifications recommended.  Ultrasound-guided biopsy of the left breast lesion recommended     4/7/2021-right breast stereotactic biopsy and left breast ultrasound-guided biopsy  Pathology  Right breast-invasive ductal carcinoma, grade 2, lymphovascular invasion present, ER +99% strong, DC +80% moderate, HER-2 negative, Ki-67 12%  Left breast upper inner quadrant 10 o'clock position biopsy, invasive ductal carcinoma, grade   2, ER +99% strong, DC +40% strong, HER-2 2+ on immunohistochemistry, nonamplified on FISH Ki-67 10%     4/14/2021-PET/CT-FDG avid aortopulmonary window lymph node measures 0.9 cm with an SUV of 7.5.  FDG avid left hilar lymph node measures 1 cm with an SUV of 17.2.  Irregular soft tissue density in the right breast measuring 3.7 x 3.8 cm is favored to be secondary to the recent breast biopsy.  1.1 cm pulmonary nodule within the left upper lobe with SUV 9.7 .  Sub-6 mm pulmonary nodules are present in the right lower lobe.  No evidence of lymphadenopathy or metastatic disease in the abdomen.  Focal area of FDG uptake within the central canal posterior to T11-T12 displaced demonstrating an SUV of 5.5 thought to be reactive however MRI is recommended for further evaluation.     She was seen by Dr. Molina who initially planned for breast surgery however  due to the PET abnormalities she has been referred to Dr. Kerr who plans to do a wound on 4/30/2021.  Dr. Molina's and Dr. Kerr's notes reviewed.     Patient denies any family history of breast cancer.  Her mother had lymphoma.  Maternal grandmother had colon  cancer.  Prior to that screening mammogram she did not have any palpable abnormalities of either breast.     She has been a heavy smoker for about 40 years and smoked 2 packs/day.  Denies any recent weight changes, new bone pains, cough, abdominal pain nausea vomiting constipation or diarrhea.  She does have anxiety and has been on several medications.  She has recently been started on Xanax to help with the mood and also with insomnia.     Patient had a video-assisted thoracoscopy and mediastinal lymphadenectomy on 4/30/2021.  L5-L6 lymph nodes were biopsied however the small pulmonary nodule in the left upper lobe was not difficult to find.  Pathology showed moderately differentiated adenocarcinoma which is CK7 and TTF-1 positive.  Consistent with lung primary.  Ki-67 85%.     5/14/2021-MRI of the brain-minimal chronic small vessel ischemic change in the white matter.  Otherwise negative MRI.     5/20/2021-bilateral axillary ultrasound-no evidence of axillary lymphadenopathy in either axilla.  Normal-appearing bilateral axillary lymph nodes visualized.     Cycle 1 cisplatin and Alimta on 5/25/2021.     Cycle 2 cisplatin Alimta 6/15/2021     Cycle 3 cisplatin Alimta 7/7/2021     Completed radiation on 7/12/2021     Port was nonfunctional hence a port study was performed which showed that the port was backed up into the innominate vein and also there was a nonocclusive thrombus at the end of the catheter extending into the superior vena cava.  She was started on anticoagulation with Eliquis.  It was recommended for port revision of the intention to keep the port.     PET/CT 8/5/2021-images independently reviewed and interpreted by me-decrease in size and FDG uptake of the left upper lobe pulmonary nodule as well as mediastinal and left hilar lymphadenopathy representing response to treatment.  Sub-6 mm pulmonary nodule in the left upper lobe new since 4/14/2021.  This could be related to radiation however follow-up  CT in 3 months recommended.  Decrease in size of the irregular masslike tissue in the right breast which is postbiopsy hematoma.  This is not PET avid.  New segmental left eighth rib fractures healing.  A new band of sclerosis of the left seventh rib which favored to represent healing nondisplaced fracture.  Follow-up CT recommended.  focal uptake in the duodenum first and second portions.  Could be related to duodenitis.  Indeterminate lesion in the L1 vertebral body not well evaluated on PET/CT.     10/1/2021-MRI of the lumbar spine.  Lesion in the left posterior body of L1 measures 14 x 14 x 12 mm and previously 5 x 5 x 6 mm.  The interval enlargement strongly suggest that it is metastatic lesion.  No additional osseous metastasis noted.   Other chronic changes noted.     10/14/2021-biopsy of the lumbar spine lesion-pathology consistent with poorly differentiated carcinoma.  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     10/26/2021-brain MRI-no evidence of metastatic disease.     10/26/2021-bilateral diagnostic mammogram and ultrasound  Scattered fibroglandular density in both breast.  Postbiopsy hematoma with internal biopsy clip in the upper outer quadrant of the right breast, 8 cm from the nipple.  The hematoma size is decreased to 2.9 cm from 3.6 cm earlier.     Left breast-parenchymal density measuring 9 x 10 mm has markedly decreased.  Few adjacent calcifications which are unchanged.  No new abnormality in the left breast.  At 10:00 region there is some minimal adjacent hypoechoic texture which is difficult to measure but appears smaller since the previous exam.     10/28/2021-PET/CT  New L1 and L2 lytic bone metastasis annually intensely hypermetabolic focus at the left adrenal gland likely represents metastatic disease as well.  Slight decrease in size of the 0.9 x 0.7 cm left upper lobe pulmonary nodule.  There is  hypermetabolic activity related to radiation pneumonitis.  The remainder of the nodule is photopenic.  Uncertain etiology of the more intense activity along the right lateral peripheral margin of the 2.6 cm postbiopsy density of the right breast.     She completed radiation to to the lumbar spine on 11/19/2021 11/22/2021-cycle 1 Alimta and Keytruda     3/23/2022-bilateral diagnostic mammogram and ultrasound  Complete resolution of the microcalcifications in the upper outer quadrant of the right breast and decrease in size of the postbiopsy hematoma.  No suspicious abnormalities in the right breast.  Benign-appearing calcifications at the site of malignancy in the left breast upper inner quadrant.  No other suspicious findings.     4/11/2022 PET/CT-there is new groundglass opacities in the left upper lobe which are most likely postradiation changes.  Other groundglass opacities in the left lung as well as the right lung remained stable.  Increased nodularity of the left adrenal gland with an SUV of 5.6, previously 3.5.    Increased uptake in the L1 vertebra in the right posterior aspect with an SUV of 3.6.  Left L1 sclerosis increased     Due to this the case was discussed in multidisciplinary conference.  The disease progression was significant in the left adrenal gland as well as the L1 vertebra.  Since there were only 2 areas of disease progression it has been decided to proceed with radiation to these 2 spots and continue on Keytruda and Alimta.     Patient also has had worsening of her kidney function.  We initially held treatment as of 5/31/2022 and creatinine bumped up to 2.0, BUN 23.  We then resumed therapy 6/11/2022 with Keytruda alone.  Patient was referred to nephrology.     6/6/2022-MRI of the spine-diffuse marrow replacement in the L1 vertebral body and inferior endplate concavity.  Suspicious for metastatic disease with pathological fracture in the inferior endplate.  Also diffuse signal  abnormality in the L2 lamina on the left suspicious for additional metastatic disease.  Abnormality of the first sacral segment suspicious for metastatic disease.  Left adrenal gland appears enlarged.  30 to 40% height loss and L2 vertebral body suggestive of a subacute compression fracture.  Degenerative changes.     7/18/2022-PET/CT  Multiple hypermetabolic osseous lesions with index lesions which have either increased in degree of FDG uptake or newly hypermetabolic lesions representative of metastatic disease and progression of disease.  Possible FDG avid lesion in the left femoral diaphysis however these are incompletely visualized and in the area of artifactual FDG uptake.  MRI of the left femur recommended for further evaluation.  Increasing FDG uptake of the left adrenal gland.  Focal left hilar hypermetabolic him corresponds to the lymph node which has minimally increased in size compared to April 2022.  New sub-6 mm pulmonary nodules.     7/26/2022-cycle 1 of Taxotere     Patient was admitted to the hospital 8/3/2022 discharged on 8/9/2022.  She was admitted for strokelike symptoms and confusion.  The confusion was thought to be secondary to polypharmacy and probably febrile neutropenia.  She was treated with antibiotics.  Mental status improved subsequently.  MRI of the cervical thoracic and lumbar spine was performed on 8/4/2022.  Images independently reviewed by me.  Multiple metastatic lesions in the spine noted.  There was a lesion on the CT which was concerning.  There is also a sacral Lesion measuring up to 5 cm in transverse dimension on the right.  Patient was symptomatic with pain on the right side of the sacrum.  Radiation oncology recommended radiation to the cervical spine as well as sacrum.  CT head without any obvious abnormalities.     Completed radiation to the cervical spine and sacrum on 8/12/2022    She was admitted to the hospital between 9/23/2022 to 10/12/2022.  She was admitted for  poorly controlled pain as well as encephalopathy and multiple falls.  She was diagnosed with a UTI and treated with IV Rocephin.  Pain medications changed and also additional radiation to the right sacrum was performed.  3000 cGy in 10 fractions was administered.  She was discharged on oxycodone 15 mg every 4 hours as needed for pain and OxyContin twice daily.  She was recommended to go to a subacute rehab however she preferred to be discharged home with home physical therapy and Occupational Therapy    9/24/2022-MRI of the pelvis showed osseous metastatic disease in the sacrum worst within the right and probable pathological fractures bilaterally.    10/4/2022 CT head without any evidence of metastatic disease or acute abnormalities.    10/25/2022-PET/CT-resolution of hypermetabolic activity in the upper lobe and left hilum.  Previously noted new tiny pulmonary nodules have resolved.  Resolution of hypermetabolic activity in the left adrenal gland.  Near complete resolution of hypermetabolic activity in the skeletal metastasis particularly the cervical spine, pelvic bones and proximal left femur.  Maximal SUV at the right sacral metastasis is 2.7 and previously it was 8.9.  Tiny focus of hypermetabolic activity at the GE junction with a maximal SUV of 4.7.  No definite thickening or abnormality noted on the CT.    11/15/2022-lumbar puncture with negative cytology of the CSF.    11/21/2022-MRI of the brain with contrast and MRI of the cervical spine with contrast without any evidence of metastatic disease.    1/12/2023-PET/CT  Hypermetabolic mediastinal lymphadenopathy and multiple liver lesions which are hypermetabolic suggestive of metastasis.  New tiny left pleural effusion is noted.  Progression of bilateral sacral fractures and right L5 transverse process fracture.  There is also an acute/subacute L1 fracture.  Lumbar spine x-rays have been recommended for evaluation of the vertebral height or lumbar MRI to be  "compared to the priors.    1/23/2023-MRI of the brain  At least 9 separate tiny 2 to 6 mm enhancing lesions in the brain compatible with multiple new brain metastasis.  There has been interval development of 3 separate foci of encephalomalacia concerning for chronic infarcts but these are new since August 2022.  Stable osseous metastasis at C2.    3/27/2023-PET/CT  Significant interval improvement of the previously seen hypermetabolic mediastinal lymphadenopathy as well as hepatic lesions.  Focal area of uptake noted in the gallbladder fundus raising concern for cholecystitis.  0.6 cm pulmonary nodule in the left upper lobe is new and follow-up CT in 3 months recommended.  New bilateral groundglass opacities in lower lobes suggestive of atypical pneumonia.  Long segment mild to moderate uptake within the esophagus suggestive of esophagitis.    3/28/2023-MRI of the brain  There are at least 9 separate 2 to 6 mm enhancing lesions in the superficial cortex of the brain which have shown a slight interval decrease in size and also decreased enhancement noted.  No new enhancing lesions noted.  Concern for focal areas of leptomeningeal seeding.  Unchanged from previous MRI tiny old lacunar infarcts in the left MCA territory.     MRI of the brain 4/28/2023-multiple new strokes in the MCA territory.  1 to 2 mm increase in size of the previously known metastatic lesions    Admitted to the hospital and evaluated by neurology and treatment has been changed to Lovenox.  2D echocardiogram without any evidence of blood clots.    Vitals:    06/13/23 1055   BP: 135/90   Pulse: 108   Resp: 18   Temp: 97.5 °F (36.4 °C)   TempSrc: Temporal   Weight: 73.9 kg (163 lb)   Height: 160 cm (62.99\")   PainSc:   7   PainLoc: Foot         PHYSICAL EXAMINATION:    General: Oriented to person, place, and time.  Ill-appearing, pale.  HEENT: EOMI.    Chest/Lungs: Clear to auscultation bilaterally. Right chest mediport in place  Heart: RRR, sinus " tachycardia  Extremities: Right lower extremity with 2+ pitting edema, erythema, tenderness of the calf  Left lower extremity edema has decreased  Extensive bruising noted on the upper extremities.        DIAGNOSTIC DATA:  Results Review:        Results from last 7 days   Lab Units 06/13/23  1027   WBC 10*3/mm3 3.21*   HEMOGLOBIN g/dL 9.2*   HEMATOCRIT % 31.0*   PLATELETS 10*3/mm3 29*     Lab Results   Component Value Date    NEUTROABS 2.99 06/13/2023           Results from last 7 days   Lab Units 06/13/23  1037   INR  3.20*           CBC reviewed and WBC 3.1, hemoglobin 9.2 and platelets 29,000, absolute neutrophil count 2.99  INR was 3.2  CMP reviewed and BUN 13, creatinine 1.04, albumin 3.4    IMAGING:    Duplex Venous Lower Extremity - Left CAR (03/21/2023 10:25)    PET/CT 1/12/2023 and MRI of the brain 1/23/2023-images independently reviewed and interpreted by me.    ASSESSMENT:  This is a 57 y.o. female with:     *Metastatic non-small cell lung cancer  She was initially diagnosed with stage III (T1N2M0) disease.    She received initial therapy with cisplatin and Alimta concurrent with radiation therapy beginning 5/25/2021.    There were indeterminate findings in the lumbar spine at L1 and it was recommended to monitor this over time.    Patient completed 3 cycles cisplatin and Alimta 7/7/2021 and completed radiation therapy 7/12/2021.    PET scan 8/6/2021 showed good response to treatment.    MRI lumbar spine 10/1/2021 with increase in size of the L1 lesion.    Biopsy of the L1 lesion on 10/14/2022 confirmed metastatic adenocarcinoma of lung origin, PD-L1 TPS 0.    PET scan 10/26/2021 with involvement of left adrenal and L1/L2 only.    Patient received radiation therapy to L1/L2 on 11/19/2021.  She also received radiation to the left adrenal gland.    She initiated further systemic therapy on 11/22/2021 with Keytruda and Alimta.   Follow-up PET scan 1/18/2022 with decrease in small left upper lobe pulmonary  nodule, resolution of activity at L1/L2, no new sites of disease.    Guardant 360 CT DNA level was monitored and was decreasing.    PET scan 4/11/2022 with progression in left adrenal and L1.    Maintained systemic therapy with Keytruda and Alimta and received additional radiation to left adrenal and L1.  Alimta however held since 5/10/2022 due to renal dysfunction.  Radiation completed to lumbar spine 7/14/2022.    PET scan 7/18/2022 with multiple areas of progression of the spine and right sacrum.    Guardant 360 analysis 7/20/2022 with no actionable mutations.    Change in therapy to single agent palliative Taxotere initiated 7/26/2022.    Altered mental status and febrile neutropenia requiring hospitalization 8/2 - 8/9/2022.  During that admission, MRI brain, cervical, thoracic, lumbar spine performed 8/4/2022 in addition to CT cervical spine 8/5/2022.  There was evidence of C2 metastatic lesion with some dural enhancement, compression deformity at T7 with mild edema suggesting acute to subacute fracture, 5 mm T12 spinous process metastasis, multifocal disease in the lumbar spine and sacrum, largest involving sacral ala to the right 5 cm in transverse dimension.  Loss of height at L2 25%.  Patient received cycle 3 Taxotere 9/6/2022 with Neulasta support.    She has been continuing as well on Xgeva every 4 weeks (last received 9/6/2022).  Patient did undergo MRI lumbar spine and pelvis on 9/24/2022.  MRI pelvis showed bilateral sacral fractures with marrow infiltration related to metastasis more prominent on the right.  Lesion on the right 3.5 x 4.1 x 4.3 cm.  Also probable metastasis along posterior acetabulum on the right 1.5 cm.  MRI lumbar spine with stable L1 metastasis, new edema endplate T12 possibly stress reaction versus developing new metastasis, lesion at T12 spinous process unchanged.   Radiation oncology consulted with plans for palliative treatment of the right sacral metastasis.  Treatment  initiated 9/28/2022  Completed radiation to the right sacral metastasis on 10/11/2022.  PET/CT 10/25/2022 without any metabolic uptake in the lung or the skeletal metastasis.  This is indicative of a positive response to Taxotere.  Pain persistent sitting and walking position and not present when she is laying down.  Patient is still very active and performance status is very poor.  Due to this reason chemotherapy will be continued to be held.  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  Due to this reason an MRI of the brain was obtained but unfortunately this was performed without contrast.  Reviewed the MRI and no evidence of metastatic disease.  11/15/2022 lumbar puncture shows no evidence of malignancy in the CSF, elevated protein at 65.2, glucose normal at 58, culture no growth in 3 days  Repeat MRI of the brain with contrast 11/21/2022 does not show any obvious abnormalities.  There is no enhancement of the CSF.  MRI of the cervical spine with no abnormalities.  Patient unable to undergo MRI of the thoracic and lumbar spine due to mental status and unable to being in 1 position as well as with incontinence.  1/4/2023-mental status has improved significantly.  Patient reports decreased oral intake and nausea and vomiting.  She is also reporting abdominal discomfort.  1/12/2023-PET/CT with mediastinal lymphadenopathy as well as multiple liver lesions suggestive of disease progression.  1/23/2023-MRI of the brain with at least 9 different foci of small mets measuring up to 6 mm.  There is also areas of encephalomalacia concerning for chronic infarcts.    Resumed Taxotere 1/31/2023.  Dose reduced to 60 mg per metered square.  3/27/2023-PET/CT shows significant response in the chest as well as the hepatic lesions.  3/28/2023-MRI of the brain-improvement in the metastatic disease to the brain with decrease in the size as well as decrease in  enhancement noted.  4/25/2023-cycle 8 of Taxotere.  Patient was admitted to the hospital on 4/28/2023 due to multiple new strokes.  Scheduled for chemotherapy next week.  MRI is  MRI of the brain 4/28/2023 shows small increase in size of the metastatic lesions in the brain by 1 to 2 mm.  Remains with extremely poor functional status.  She does not have a good quality of life.  She is requesting that her pain be adequately controlled.  Jelani her  has been trying to hold back on some of the pain medications as he is concerned about the previous mental status changes that she had experienced.  She reports severe neuropathy and not willing to proceed with chemotherapy today.  I am definitely concerned if we continue with the Taxotere it may result in worsening neuropathy.  [id number] performed in July 2022 did not show any significant targetable mutations.  Gemcitabine to start 5/30/2023, labs reviewed and stable to proceed.  Gemcitabine is weekly, 3 weeks on and 1 week off.  Discussed with Dr. العلي regarding radiation to the brain due to slight increase in size of the lesions.  She felt like we would hold off on radiation for the time being due to her poor quality of life and see if good disease of control is obtained with gemcitabine and if quality of life improves then could consider radiation down the line.  6/13/2023-patient scheduled for cycle 1 day 15 of gemcitabine.  Has severe thrombocytopenia with a platelet count of 29,000.  We will hold off gemcitabine at this time.  Today I had a lengthy discussion with Marcelle as well as Jelani regarding goals of care and explained to her that although we are currently able to control the cancer with ongoing chemotherapy she did have a significant decline in quality of life and functional status.  She kept saying that she does not want to do any further chemotherapy however when offered referral to hospice/palliative care patient requested if she could try every  other week gemcitabine and see if she would feel any better.  Today we would have to hold chemotherapy due to cytopenias.  I will reassess patient's symptoms and tolerance to chemo next week and see if she would benefit any from ongoing palliative gemcitabine.    *Thrombocytopenia  Severe thrombocytopenia likely secondary to gemcitabine.  1 unit of platelets will be transfused as patient is on anticoagulation with Coumadin with an INR of 3.2 which increases her risk of bleeding significantly in the setting of thrombocytopenia.     *History of bilateral stage I breast cancer  Patient with bilateral breast cancer, both stage I ([id number]), grade 2, ER/WA positive and HER2/melinda negative with low Ki-67.    Patient initiated adjnuvant letrozole in May 2021.  Letrozole discontinued as she is on gemcitabine  No changes    *Mental status changes  Patient's mental status is back at baseline.  Significant improvement  Currently off all psychotropic medications.  MRI of the brain 3/28/2023 with slight improvement in the 9 lesions noted previously  Mental status remains at baseline  4/28/2023-MRI shows new strokes  Mental status is at baseline.     *Cancer related pain  Patient has been receiving Duragesic patch 50 mcg every 72 hours in addition to oxycodone 15 mg every 4 hours for breakthrough pain.  Duragesic dose had been escalated recently in the outpatient setting due to worsening pain  On admission 9/23/2022, Duragesic patch increased to 75 mcg daily, added Decadron 4 mg IV every 6 hours with continuation of oxycodone 15 mg every 4 hours as needed for breakthrough pain in addition of Dilaudid 1 mg every 2 hours as needed for breakthrough pain.  Patient with increasing side effects from narcotics, Duragesic decreased on 9/25/2022 from 75 down to 50 mcg patch every 72 hours.  Dexamethasone dose decreased to 4 mg p.o. every 12 hours on 9/27/2022  Initiated palliative radiation to the right sacrum on 9/28/2022  Patient became  confused after receiving Dilaudid.  Patient and family asked to discontinue Dilaudid.  Duragesic patch dose was increased to 75 mcg/h on 9/30/2022. Subsequent decrease to 50 mcg due to confusion  Gabapentin was discontinued due to the sedating effect.  Oxycodone is being used for breakthrough pain.  10/3 transition to sustained release oxycodone 40 mg bid and fentanyl patch stopped  Poorly controlled, pain particularly worse in sitting and standing position.  Unsure if extends is contributing to mental status changes as previously patient had been very sensitive to pain meds.  Due to this reason we will discontinue the Xtampza ER and switch to OxyContin 20 mg twice daily.  Mental status returned to baseline on holding the pain medications.  However patient now reports significant pain in her back as well as her feet which is very poorly controlled.  She is receiving oxycodone 20 mg for pain control.  Patient also expressed that she does not have a good quality of life due to an inadequate pain control at this time.  Referral to palliative care has been placed.  She was started on 100 mg of gabapentin at her last visit however her  notes that she is increasingly confused.  Discussed changing gabapentin to every other day  6/13/2023-pain well controlled at this time      *Anxiety/depression  Currently all medications have been discontinued due to mental status changes  Continue Xanax as needed  Tearful today due to inadequate pain control.  Continue oxycodone  Referred to palliative care  Patient has not scheduled an appointment with palliative care yet.     *Insomnia  She has been receiving Xanax as needed  Continue Xanax currently  Improved    *Anemia  Hemoglobin 7.6 3/14/2023.  B12, ferritin, iron studies, and folate unremarkable.  1 unit of PRBC given 3/16/2023  3/21/2023 hemoglobin 8.4  3/14/2023-iron studies reviewed and iron saturation 24%, TIBC 329, ferritin 189  Vitamin B12 borderline at 376, folic  acid normal at greater than 20  Hemoglobin lower at 8.3 today.  INR 3.7  Decrease Coumadin to 1 mg daily  6/13/2023-hemoglobin stable at 9.2     *Peripheral neuropathy  Patient was on gabapentin 300 mg twice daily.  Gabapentin was discontinued on 9/30/2022.  Neuropathy stable  Patient noting heaviness to both lower extremities when seen on 3/21/2023, possible worsening neuropathy secondary to Taxotere?  Reevaluate further at next visit once anticoagulant changed and possibly acute DVT pain eliminated.  Continues to experience severe neuropathy.  We will discontinue Taxotere as her symptoms are very poorly controlled at this time and I am worried that Taxotere would worsen the neuropathy.  Change gabapentin 100 mg every other day, Jelani her  noticed mental status changes with gabapentin and hence this is not being administered to her regularly.     *Nausea vomiting and abdominal discomfort  Continues to experience vomiting.  Abdominal discomfort has resolved  PET/CT with liver metastasis.  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  Continue Compazine as well as Zofran as needed  Nausea and vomiting have pretty much resolved.  Noted to have area of focal uptake on the PET/CT over the gallbladder which is suggestive of cholecystitis.  Nausea well controlled on current medications    *Mouth sores  Reports white coating of the tongue consistent with candidiasis.  Very SmartCheck mouthwash prescribed    *GI prophylaxis  Continue omeprazole daily  No changes  PET/CT suggestive of esophagitis    *Deconditioning  Secondary to significant lower extremity edema, and back pain  Patient reports a very poor quality of life.  Discussed goals of care and comfort measures however patient not agreeable to the same at this time    *Hypokalemia  Potassium normal today    *Brain metastasis  Small increase in size of the metastatic lesions by 1 to 2 mm  Discussed with Dr. العلي who recommends holding off on radiation at  this time due to poor quality of life  We will reassess with MRI down the line    *Abnormal LFTs  LFTs normal today    *Acute left soleal deep vein thrombosis 3/21/2023  Patient seen 3/21/2023 after having a left lower extremity Doppler due to reports of severe left lower extremity pain with redness and some swelling noted.  Preliminary results positive for acute soleal DVT.  Patient continues on Eliquis 5 mg twice daily and denies missing doses.  Case discussed with Dr. Snell and we will obtain additional lab work including beta-2 glycoprotein, lupus anticoagulant, and anticardiolipin antibody.  We will proceed with switching her Eliquis to Xarelto 15 mg twice daily x21 days then 20 mg daily thereafter.  She was given samples for the loading dose and will need a prescription at a later date for the 20 mg once daily.  Hypercoagulability work-up particularly antiphospholipid syndrome labs have been reviewed and negative  Xarelto discontinued due to multiple new strokes  Currently on Lovenox.  Patient is having significant trouble tolerating Lovenox.  INR noted to be 3.8 on Coumadin  Pharmacy managing the INR  INR 3.2  Left lower extremity edema has decreased however persistent right lower extremity edema, repeat Dopplers    *Excessive bleeding  She had a recent fall injuring her right forearm resulting in excessive bleeding.  She is also on therapeutic Lovenox which is making the significantly worse.  Patient is also complaining of trouble with Lovenox injections resulting in large bruises on her abdomen.  Continue Coumadin, INR 3.8  6/6/2023: Continue Coumadin.  INR 2.7  6/13/2023-INR 3.2, platelets low at 29,000, transfuse 1 unit of platelets.    *Leukocytosis  Likely reactive  Unchanged  Continue to monitor  Resolved    *bilateral lower extremity edema  Secondary to venous stasis  Recommend compression and elevation of the lower extremities  She has not used compression.  She has been elevating the lower  extremities some.  Continue Lasix 20 mg daily  Left lower extremity edema has resolved  Persistent right lower extremity edema now with some erythema  Start Keflex        RECOMMENDATIONS:    Coumadin dosing per pharmacy, INR 3.2, patient will be starting Keflex.  Notified pharmacy about possible interactions.  Continue compression socks  Right lower extremity Doppler  Hold gemcitabine today  Change gemcitabine to every other week  Continue gabapentin every other day  Referred to palliative care  Continue Xanax and oxycodone  Continue Lasix 20 mg daily  Follow-up in 1 week  I would strongly recommend comfort measures/hospice given her poor tolerance to chemotherapy and extremely poor functional status.  However patient and her  are not agreeable to comfort measures at this time.  I will plan to see the patient back in 1 week and rediscuss goals of care again.    52 minutes have been spent on the encounter including reviewing the medical records, face-to-face time, discussing goals of care, care coordination, documentation on the same day    Salma Snell MD  [facility] OUTPATIENT PROGRESS NOTE

## 2023-06-13 NOTE — PROGRESS NOTES
Anticoagulation Clinic Progress Note    Patient's visit was held in office today.    Anticoagulation Summary  As of 6/13/2023      INR goal:  2.0-3.0   TTR:  34.6 % (3.7 wk)   INR used for dosing:  3.20 (6/13/2023)   Warfarin maintenance plan:  4 mg (4 mg x 1) every Mon, Fri; 2 mg (4 mg x 0.5) all other days; Starting 6/13/2023   Weekly warfarin total:  18 mg   Plan last modified:  Marta Ferguson RPH (6/13/2023)   Next INR check:  6/20/2023   Priority:  High   Target end date:  Indefinite    Indications    Acute deep vein thrombosis (DVT) of left lower extremity  unspecified vein [I82.402]                 Anticoagulation Episode Summary       INR check location:      Preferred lab:      Send INR reminders to:  [facility] CBC ANTICOAG POOL    Comments:  [facility] lab/in clinic New to warf May 2023          Anticoagulation Care Providers       Provider Role Specialty Phone number    Salma Snell MD Referring Hematology and Oncology [phone]            Clinic Interview:  Patient Findings     Positives:  Change in health    Negatives:  Signs/symptoms of thrombosis, Signs/symptoms of bleeding,   Laboratory test error suspected, Change in alcohol use, Change in   activity, Upcoming invasive procedure, Emergency department visit,   Upcoming dental procedure, Missed doses, Extra doses, Change in   medications, Change in diet/appetite, Hospital admission, Bruising, Other   complaints    Comments:  No bleeding issues, continued bruising on arms and R leg   red/swollen (on going).  MD appt today--Keflex started.  Spouse also notes ongoing diarrhea.          INR History:      Latest Ref Rng & Units 5/30/2023    11:20 AM 6/2/2023     9:30 AM 6/2/2023     9:53 AM 6/6/2023     9:03 AM 6/6/2023     9:30 AM 6/13/2023    10:37 AM 6/13/2023    11:00 AM   Anticoagulation Monitoring   INR   3.10   2.70  3.20   INR Date   6/2/2023 6/6/2023 6/13/2023   INR Goal   2.0-3.0   2.0-3.0  2.0-3.0   Trend   Down   Same  Down   Last Week  Total   20 mg   18 mg  20 mg   Next Week Total   20 mg   20 mg  18 mg   Sun   2 mg   2 mg  2 mg   Mon   4 mg   4 mg  4 mg   Tue   -   2 mg  2 mg   Wed   -   4 mg  2 mg   Thu   -   2 mg  2 mg   Fri   4 mg   4 mg  4 mg   Sat   2 mg   2 mg  2 mg   Historical INR 0.90 - 1.10 3.70   3.10  2.70   3.20     Visit Report  Report   Report Report Report Report       Plan:  1. INR is Supratherapeutic today and patient to start antibiotic with ongoing diarrhea noted- see above in Anticoagulation Summary.  Will instruct Holli Patel to HOLD warfarin today, then take 4mg only on Fri and 2mg all other days- see above in Anticoagulation Summary.  2. Follow up in 1 week  3. Verbal and written information provided. Patient expresses understanding and has no further questions at this time.    Marta Ferguson [facility]

## 2023-06-13 NOTE — TELEPHONE ENCOUNTER
Returned call to Jelain. He inquired if any adjustments needed to be made to Holli's warfarin since she's going to be on keflex.  I advised him that I have already let the pharamcist know that she will be on keflex and no changes made, she will return in one week and recheck at that time.

## 2023-06-14 ENCOUNTER — TELEPHONE (OUTPATIENT)
Dept: ONCOLOGY | Facility: CLINIC | Age: 58
End: 2023-06-14
Payer: COMMERCIAL

## 2023-06-14 ENCOUNTER — HOSPITAL ENCOUNTER (OUTPATIENT)
Dept: INFUSION THERAPY | Facility: HOSPITAL | Age: 58
Discharge: HOME | End: 2023-06-14

## 2023-06-14 NOTE — TELEPHONE ENCOUNTER
Call to Jelani regarding Holli's potassium level from yesterdays visit.  He reports they have some 20mEq tablets on hand.  She is currently not taking them.  Instructed him per DR Snell to begin one daily.  He voiced understanding.

## 2023-06-15 ENCOUNTER — HOSPITAL ENCOUNTER (OUTPATIENT)
Dept: INFUSION THERAPY | Facility: HOSPITAL | Age: 58
Discharge: HOME | End: 2023-06-15
Admitting: INTERNAL MEDICINE
Payer: COMMERCIAL

## 2023-06-15 VITALS
HEART RATE: 109 BPM | TEMPERATURE: 98.2 F | RESPIRATION RATE: 18 BRPM | DIASTOLIC BLOOD PRESSURE: 87 MMHG | SYSTOLIC BLOOD PRESSURE: 137 MMHG | OXYGEN SATURATION: 98 %

## 2023-06-15 DIAGNOSIS — Z45.2 ENCOUNTER FOR FITTING AND ADJUSTMENT OF VASCULAR CATHETER: Primary | ICD-10-CM

## 2023-06-15 DIAGNOSIS — C79.51 NON-SMALL CELL LUNG CANCER METASTATIC TO BONE: ICD-10-CM

## 2023-06-15 DIAGNOSIS — C34.90 NON-SMALL CELL LUNG CANCER METASTATIC TO BONE: ICD-10-CM

## 2023-06-15 PROCEDURE — P9035 PLATELET PHERES LEUKOREDUCED: HCPCS

## 2023-06-15 PROCEDURE — P9100 PATHOGEN TEST FOR PLATELETS: HCPCS

## 2023-06-15 PROCEDURE — 36430 TRANSFUSION BLD/BLD COMPNT: CPT

## 2023-06-15 PROCEDURE — 25010000002 HEPARIN LOCK FLUSH PER 10 UNITS: Performed by: INTERNAL MEDICINE

## 2023-06-15 RX ORDER — SODIUM CHLORIDE 9 MG/ML
250 INJECTION, SOLUTION INTRAVENOUS AS NEEDED
Status: DISCONTINUED | OUTPATIENT
Start: 2023-06-15 | End: 2023-06-17 | Stop reason: HOSPADM

## 2023-06-15 RX ORDER — HEPARIN SODIUM (PORCINE) LOCK FLUSH IV SOLN 100 UNIT/ML 100 UNIT/ML
500 SOLUTION INTRAVENOUS AS NEEDED
Status: DISCONTINUED | OUTPATIENT
Start: 2023-06-15 | End: 2023-06-17 | Stop reason: HOSPADM

## 2023-06-15 RX ORDER — SODIUM CHLORIDE 0.9 % (FLUSH) 0.9 %
10 SYRINGE (ML) INJECTION AS NEEDED
Status: DISCONTINUED | OUTPATIENT
Start: 2023-06-15 | End: 2023-06-17 | Stop reason: HOSPADM

## 2023-06-15 RX ORDER — HEPARIN SODIUM (PORCINE) LOCK FLUSH IV SOLN 100 UNIT/ML 100 UNIT/ML
500 SOLUTION INTRAVENOUS AS NEEDED
Status: CANCELLED | OUTPATIENT
Start: 2023-06-15

## 2023-06-15 RX ORDER — SODIUM CHLORIDE 0.9 % (FLUSH) 0.9 %
10 SYRINGE (ML) INJECTION AS NEEDED
Status: CANCELLED | OUTPATIENT
Start: 2023-06-15

## 2023-06-15 RX ADMIN — Medication 10 ML: at 11:12

## 2023-06-15 RX ADMIN — HEPARIN 500 UNITS: 100 SYRINGE at 11:12

## 2023-06-16 LAB
BH BB BLOOD EXPIRATION DATE: NORMAL
BH BB BLOOD TYPE BARCODE: 5100
BH BB DISPENSE STATUS: NORMAL
BH BB PRODUCT CODE: NORMAL
BH BB UNIT NUMBER: NORMAL
UNIT  ABO: NORMAL
UNIT  RH: NORMAL

## 2023-06-22 PROBLEM — R41.0 DELIRIUM: Status: ACTIVE | Noted: 2023-06-22

## 2023-06-23 PROBLEM — D64.9 ANEMIA: Status: ACTIVE | Noted: 2023-01-01

## 2023-06-26 NOTE — TELEPHONE ENCOUNTER
Caller: BK CARPENTER    Relationship to patient: Emergency Contact    Best call back number: [phone]    Chief complaint: PATIENT IN HOSPITAL     Type of visit: PORT FLUSH, F/U 2 & INFUSION     Requested date: WILL CALL BACK TO R/S     If rescheduling, when is the original appointment: 6/27/2023      FYI: LET THE PHARMACY KNOW THAT PATIENT IS IN THE HOSPITAL THEY ARE CHECKING HER WARFARIN LEVELS.

## 2023-08-25 RX ORDER — FUROSEMIDE 20 MG/1
TABLET ORAL
Qty: 90 TABLET | Refills: 1 | OUTPATIENT
Start: 2023-08-25

## 2024-04-08 NOTE — THERAPY TREATMENT NOTE
Patient Name: Holli Patel  : 1965    MRN: [id number]                              Today's Date: 10/12/2022       Admit Date: 2022    Visit Dx:     ICD-10-CM ICD-9-CM   1. Primary adenocarcinoma of upper lobe of left lung (HCC)  C34.12 162.3   2. 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)  C34.90 162.9    C79.70 198.7     Patient Active Problem List   Diagnosis   • History of gastroesophageal reflux (GERD)   • Primary insomnia   • Mixed hyperlipidemia   • Other specified hypothyroidism   • Arthritis   • Anxiety   • History of migraine   • Essential hypertension   • History of colon polyps   • Diverticulosis   • Leukocytosis   • Personal history of tobacco use   • Osteopenia   • Dyspnea   • Malignant neoplasm of upper-outer quadrant of right breast in female, estrogen receptor positive (HCC)   • Malignant neoplasm of upper-inner quadrant of left breast in female, estrogen receptor positive (HCC)   • Primary adenocarcinoma of upper lobe of left lung (HCC)   • Encounter for fitting and adjustment of vascular catheter   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)   • 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)   • History of bilateral breast cancer   • History of DVT (deep vein thrombosis)   • Stage 3a chronic kidney disease (HCC)   • Confusion   • Chemotherapy-induced neutropenia (HCC)   • Metabolic acidosis   • Ataxia   • Bony metastasis (HCC)   • Hypopotassemia   • Hypophosphatemia   • Hypomagnesemia   • Cancer related pain   • Acute cystitis without hematuria     Past Medical History:   Diagnosis Date   • Anxiety    • Clot     POSSIBLE BLOOD CLOT IN VENOUS ACCESS DEVICE-PT STATES WAS STARTED ON ELIQUIS    • Dyspnea on exertion    • Elevated cholesterol    • Frequent urination     DAY AND NIGHT   • SHAYY (generalized anxiety disorder)     RELATED HIGH BLOOD PRESSURE   •  GERD (gastroesophageal reflux disease)    • High blood pressure     ANXIETY RELATED   • Hyperlipidemia    • Hypothyroidism    • Lung cancer (HCC)    • Lung nodule     LEFT   • Malignant neoplasm of upper-outer quadrant of right breast in female, estrogen receptor positive (HCC) 4/13/2021    PT STATES HAS BILAT BREAST CANCER   • Migraine    • PONV (postoperative nausea and vomiting)      Past Surgical History:   Procedure Laterality Date   • BREAST BIOPSY Bilateral 04/07/2021    Right Breast 10 o'clock & Left breast 10 o'clock 6 cm from nipple, [facility]   • CLOSED REDUCTION HIP DISLOCATION Left 4/30/2021    Procedure: BRONCHOSCOPY, LEFT VIDEO ASSITED THORACOSCOPY, ROBOTIC ASSSITED MEDIASTINAL LYMPHADENECTOMY WITH INTERCOSTAL NERVE BLOCKS;  Surgeon: Raphael Kerr III, MD;  Location: [facility] MAIN OR;  Service: DaVinci;  Laterality: Left;   • COLONOSCOPY     • TUBAL ABDOMINAL LIGATION     • VENOUS ACCESS DEVICE (PORT) INSERTION Right 5/20/2021    Procedure: INSERTION VENOUS ACCESS DEVICE;  Surgeon: Raphael Kerr III, MD;  Location: [facility] MAIN OR;  Service: Thoracic;  Laterality: Right;   • VENOUS ACCESS DEVICE (PORT) INSERTION Right 11/29/2021    Procedure: REVISION AND REPLACEMENT RIGHT SUBCLAVIAN VENOUS ACCESS PORT;  Surgeon: Raphael Kerr III, MD;  Location: [facility] MAIN OR;  Service: Thoracic;  Laterality: Right;   • WRIST SURGERY Right       General Information     Row Name 10/12/22 1525          Physical Therapy Time and Intention    Document Type therapy note (daily note)  -EE     Mode of Treatment physical therapy;individual therapy  -EE     Row Name 10/12/22 1525          General Information    Existing Precautions/Restrictions fall  -EE     Barriers to Rehab medically complex;cognitive status  -EE     Row Name 10/12/22 1525          Cognition    Orientation Status (Cognition) oriented x 3  -EE     Row Name 10/12/22 1525          Safety Issues, Functional Mobility    Safety Issues Affecting Function  (Mobility) problem-solving;sequencing abilities;safety precautions follow-through/compliance  -EE     Impairments Affecting Function (Mobility) balance;endurance/activity tolerance;strength;cognition  -EE           User Key  (r) = Recorded By, (t) = Taken By, (c) = Cosigned By    Initials Name Provider Type    EE Mary Avina PT Physical Therapist               Mobility     Row Name 10/12/22 1525          Bed Mobility    Supine-Sit [place] (Bed Mobility) standby assist;verbal cues  -EE     Assistive Device (Bed Mobility) head of bed elevated  -EE     Row Name 10/12/22 1525          Sit-Stand Transfer    Sit-Stand [place] (Transfers) contact guard;verbal cues  -EE     Assistive Device (Sit-Stand Transfers) walker, front-wheeled  -EE     Comment, (Sit-Stand Transfer) cues for hand placement  -EE     Row Name 10/12/22 1525          Gait/Stairs (Locomotion)    [place] Level (Gait) contact guard;verbal cues;1 person to manage equipment  CGA x1, 2nd person following with chair for safety  -EE     Assistive Device (Gait) walker, front-wheeled  -EE     Distance in Feet (Gait) 65' x 1  -EE     Deviations/Abnormal Patterns (Gait) yves decreased;stride length decreased  -EE     Bilateral Gait Deviations forward flexed posture;heel strike decreased  -EE     Comment, (Gait/Stairs) Cues for upright posture and to relax B shoulders. No instance of knee buckling during session today; however, chair follow for safety with ambulation.  -EE           User Key  (r) = Recorded By, (t) = Taken By, (c) = Cosigned By    Initials Name Provider Type    EE Mary Avina PT Physical Therapist               Obj/Interventions     Row Name 10/12/22 1527          Motor Skills    Therapeutic Exercise hip;knee;ankle  Seated B marching, LAQ, ankle pumps x 10 reps each  -EE     Row Name 10/12/22 1527          Balance    Static Sitting Balance supervision  -EE     Dynamic Sitting Balance standby assist  -EE     Position, Sitting  Balance unsupported;sitting edge of bed  -EE     Static Standing Balance contact guard  -EE     Dynamic Standing Balance contact guard  -EE     Position/Device Used, Standing Balance supported;walker, rolling  -EE           User Key  (r) = Recorded By, (t) = Taken By, (c) = Cosigned By    Initials Name Provider Type    Mary Simmons PT Physical Therapist               Goals/Plan    No documentation.                Clinical Impression     Row Name 10/12/22 1531          Pain    Pretreatment Pain Rating 6/10  -EE     Posttreatment Pain Rating 6/10  -EE     Pain Location - Side/Orientation Right  -EE     Pain Location lower  -EE     Pain Location - extremity  -EE     Pain Intervention(s) Repositioned;Rest  -EE     Row Name 10/12/22 1531          Plan of Care Review    Plan of Care Reviewed With patient  -EE     Outcome Evaluation Pt able to maintain activity level with PT today. Able to ambulate 65' with rwx and CGA. Pt demonstrated no overt LOB or instances of knee buckling during session today; however, followed closely with chair during ambulation for safety. Pt remains at increased risk of falls due to ongoing weakness, impaired balance, and decreased endurance and will continue to benefit from PT to address impairments and increase independence with mobility.  -EE     Row Name 10/12/22 1531          Positioning and Restraints    Pre-Treatment Position in bed  -EE     Post Treatment Position bed  -EE     In Bed sitting EOB;call light within reach;encouraged to call for assist;with nsg;notified nsg  sitter present at bedside  -EE           User Key  (r) = Recorded By, (t) = Taken By, (c) = Cosigned By    Initials Name Provider Type    Mary Simmons PT Physical Therapist               Outcome Measures     Row Name 10/12/22 1535 10/12/22 0814       How much help from another person do you currently need...    Turning from your back to your side while in flat bed without using bedrails? 3  -EE 3  -DH    Moving  from lying on back to sitting on the side of a flat bed without bedrails? 3  -EE 3  -DH    Moving to and from a bed to a chair (including a wheelchair)? 3  -EE 3  -DH    Standing up from a chair using your arms (e.g., wheelchair, bedside chair)? 3  -EE 3  -DH    Climbing 3-5 steps with a railing? 2  -EE 2  -DH    To walk in hospital room? 3  -EE 2  -DH    AM-PAC 6 Clicks Score (PT) 17  -EE 16  -DH    Highest level of mobility 5 --> Static standing  -EE 5 --> Static standing  -DH          User Key  (r) = Recorded By, (t) = Taken By, (c) = Cosigned By    Initials Name Provider Type    Mary Simmons, [facility] Physical Therapist     Yosvany Olea, RN Registered Nurse                             Physical Therapy Education     Title: PT OT SLP Therapies (Done)     Topic: Physical Therapy (Done)     Point: Mobility training (Done)     Learning Progress Summary           Patient Acceptance, E,TB, VU,NR by  at 10/12/2022 1535    Acceptance, E, VU by  at 10/12/2022 1100    Acceptance, E, VU by  at 10/11/2022 1100    Acceptance, E,TB,D, VU,NR by  at 10/7/2022 1615    Acceptance, E, VU,NR by  at 10/6/2022 1452    Acceptance, E, VU by  at 10/5/2022 1931    Acceptance, E,D, VU,NR by  at 10/5/2022 1151    Acceptance, E, VU by  at 10/4/2022 1944    Acceptance, E, VU by  at 10/4/2022 1943    Acceptance, E, VU by  at 10/4/2022 1942    Acceptance, E,D, VU,NR by  at 10/3/2022 1147    Acceptance, E,TB,D, VU,NR by  at 10/1/2022 1724    Acceptance, E, VU by  at 9/29/2022 1157    Acceptance, E,D, NR by  at 9/29/2022 0956    Acceptance, E,D, VU,NR by PH at 9/28/2022 1457    Acceptance, E, NR by  at 9/26/2022 1133   Family Acceptance, E, VU by  at 10/12/2022 1100    Acceptance, E, VU by  at 9/29/2022 1157    Acceptance, E, NR by  at 9/26/2022 1133   Significant Other Acceptance, E, VU by  at 10/11/2022 1100                   Point: Home exercise program (Done)     Learning Progress Summary            Patient Acceptance, E,TB, VU,NR by  at 10/12/2022 1535    Acceptance, E, VU by  at 10/12/2022 1100    Acceptance, E, VU by DH at 10/11/2022 1100    Acceptance, E, VU by HF at 10/5/2022 1931    Acceptance, E, VU by HF at 10/4/2022 1944    Acceptance, E, VU by HF at 10/4/2022 1943    Acceptance, E, VU by HF at 10/4/2022 1942    Acceptance, E,D, VU,NR by PH at 10/3/2022 1147    Acceptance, E,TB,D, VU,NR by  at 10/1/2022 1724    Acceptance, E, VU by HF at 9/29/2022 1157    Acceptance, E,D, NR by PH at 9/29/2022 0956    Acceptance, E,D, VU,NR by PH at 9/28/2022 1457   Family Acceptance, E, VU by  at 10/12/2022 1100    Acceptance, E, VU by HF at 9/29/2022 1157   Significant Other Acceptance, E, VU by  at 10/11/2022 1100                   Point: Body mechanics (Done)     Learning Progress Summary           Patient Acceptance, E, VU by  at 10/12/2022 1100    Acceptance, E, VU by  at 10/11/2022 1100    Acceptance, E,TB,D, VU,NR by  at 10/7/2022 1615    Acceptance, E, VU,NR by  at 10/6/2022 1452    Acceptance, E, VU by HF at 10/5/2022 1931    Acceptance, E,D, VU,NR by PH at 10/5/2022 1151    Acceptance, E, VU by HF at 10/4/2022 1944    Acceptance, E, VU by HF at 10/4/2022 1943    Acceptance, E, VU by HF at 10/4/2022 1942    Acceptance, E,D, VU,NR by PH at 10/3/2022 1147    Acceptance, E,TB,D, VU,NR by  at 10/1/2022 1724    Acceptance, E, VU by HF at 9/29/2022 1157    Acceptance, E,D, NR by PH at 9/29/2022 0956    Acceptance, E,D, VU,NR by PH at 9/28/2022 1457    Acceptance, E, NR by DJ at 9/26/2022 1133   Family Acceptance, E, VU by DH at 10/12/2022 1100    Acceptance, E, VU by  at 9/29/2022 1157    Acceptance, E, NR by DJ at 9/26/2022 1133   Significant Other Acceptance, E, VU by DH at 10/11/2022 1100                   Point: Precautions (Done)     Learning Progress Summary           Patient Acceptance, E, VU by DH at 10/12/2022 1100    Acceptance, E, VU by DH at 10/11/2022 1100    Acceptance, E, VU  by MD at 10/10/2022 1329    Acceptance, E,TB,D, VU,NR by  at 10/7/2022 1615    Acceptance, E, VU,NR by  at 10/6/2022 1452    Acceptance, E, VU by  at 10/5/2022 1931    Acceptance, E,D, VU,NR by  at 10/5/2022 1151    Acceptance, E, VU by HF at 10/4/2022 1944    Acceptance, E, VU by  at 10/4/2022 1943    Acceptance, E, VU by  at 10/4/2022 1942    Acceptance, E,D, VU,NR by  at 10/3/2022 1147    Acceptance, E,TB,D, VU,NR by  at 10/1/2022 1724    Acceptance, E, VU by  at 9/29/2022 1157    Acceptance, E,D, NR by  at 9/29/2022 0956    Acceptance, E,D, VU,NR by  at 9/28/2022 1457    Acceptance, E, NR by  at 9/26/2022 1133   Family Acceptance, E, VU by  at 10/12/2022 1100    Acceptance, E, VU by  at 9/29/2022 1157    Acceptance, E, NR by  at 9/26/2022 1133   Significant Other Acceptance, E, VU by  at 10/11/2022 1100                               User Key     Initials Effective Dates Name Provider Type Discipline     06/16/21 -  Aniyah Garzon, PT Physical Therapist PT     06/16/21 -  Mary Avina, PT Physical Therapist PT    MD 06/16/21 -  Gwen Phillips, PT Physical Therapist PT     03/07/18 -  Aura Lomas PTA Physical Therapist Assistant PT     06/16/21 -  Yamileth Thomas PTA Physical Therapist Assistant PT     10/25/19 -  Wendy Paez, PT Physical Therapist PT     08/04/22 -  Yosvany Olea, RN Registered Nurse Nurse     09/22/22 -  Karma Parham, RN Registered Nurse Nurse              PT Recommendation and Plan     Plan of Care Reviewed With: patient  Outcome Evaluation: Pt able to maintain activity level with PT today. Able to ambulate 65' with rwx and CGA. Pt demonstrated no overt LOB or instances of knee buckling during session today; however, followed closely with chair during ambulation for safety. Pt remains at increased risk of falls due to ongoing weakness, impaired balance, and decreased endurance and will continue to benefit from PT to  address impairments and increase independence with mobility.     Time Calculation:    PT Charges     Row Name 10/12/22 1536             Time Calculation    Start Time 1506  -EE      Stop Time 1516  -EE      Time Calculation (min) 10 min  -EE      PT Received On 10/12/22  -EE      PT - Next Appointment 10/14/22  -EE         Time Calculation- PT    Total Timed Code Minutes- PT 10 minute(s)  -EE         Timed Charges    16137 - Gait Training Minutes  10  -EE         Total Minutes    Timed Charges Total Minutes 10  -EE       Total Minutes 10  -EE            User Key  (r) = Recorded By, (t) = Taken By, (c) = Cosigned By    Initials Name Provider Type    EE Mary Avina, PT Physical Therapist              Therapy Charges for Today     Code Description Service Date Service Provider Modifiers Qty    [id number] HC GAIT TRAINING EA 15 MIN 10/12/2022 Mary Avina, PT GP 1          PT G-Codes  Outcome Measure Options: AM-PAC 6 Clicks Basic Mobility (PT)  AM-PAC 6 Clicks Score (PT): 17  AM-PAC 6 Clicks Score (OT): 15    Mary Avina PT  10/12/2022     [Negative] : Heme/Lymph

## 2025-01-15 NOTE — PLAN OF CARE
Goal Outcome Evaluation:           Progress: declining  Outcome Evaluation: Pt. arrived to the floor from ED. Admitted d/t ataxia and confusion. Pt. screen positive for sepsis during admission. IV antibiotics started. R-chest port accessed with good blood return. NS @ 75. Blood cultures obtained. GI panel also ordered; unable to collect this shift. Pt. able to get up with asst x1 to [facility]. Bed alarm set. Pt. on telemetry. Awaiting MRI.   Spoke with mom and she states that Zandra did not touch the rash, but the spots are now continuing to pop up on the skin. There has not been any recent illness or fever. Mom sending pics through [facility] for review. Advised that I would discuss with Ashley and get back to her. Mom agreeable.

## (undated) DEVICE — TOTAL TRAY, 16FR 10ML SIL FOLEY, URN: Brand: MEDLINE

## (undated) DEVICE — DRAPE,REIN 53X77,STERILE: Brand: MEDLINE

## (undated) DEVICE — NDL SPINE 22G 5IN BLK

## (undated) DEVICE — 3M™ IOBAN™ 2 ANTIMICROBIAL INCISE DRAPE 6650EZ: Brand: IOBAN™ 2

## (undated) DEVICE — COLUMN DRAPE

## (undated) DEVICE — PENCL E/S ULTRAVAC TELESCP NOSE HOLSTR 10FT

## (undated) DEVICE — SYR LUERLOK 20CC BX/50

## (undated) DEVICE — EXTENSION SET, MALE LUER LOCK ADAPTER WITH RETRACTABLE COLLAR

## (undated) DEVICE — ANTIBACTERIAL UNDYED BRAIDED (POLYGLACTIN 910), SYNTHETIC ABSORBABLE SUTURE: Brand: COATED VICRYL

## (undated) DEVICE — DECANT BG O JET

## (undated) DEVICE — Device

## (undated) DEVICE — CANNULA SEAL

## (undated) DEVICE — SYR LUERLOK 5CC

## (undated) DEVICE — NDL HYPO PRECISIONGLIDE REG 25G 1 1/2

## (undated) DEVICE — GLV SURG PREMIERPRO ORTHO LTX PF SZ8 BRN

## (undated) DEVICE — OASIS DRAIN, SINGLE, INLINE & ATS COMPATIBLE: Brand: OASIS

## (undated) DEVICE — GLV SURG TRIUMPH PF LTX 7 STRL

## (undated) DEVICE — C-MAC® GUIDE: Brand: C-MAC / DBLADE

## (undated) DEVICE — LAPAROSCOPIC SMOKE FILTRATION SYSTEM: Brand: PALL LAPAROSHIELD® PLUS LAPAROSCOPIC SMOKE FILTRATION SYSTEM

## (undated) DEVICE — ARM DRAPE

## (undated) DEVICE — Device: Brand: PORTEX

## (undated) DEVICE — ENDOPATH XCEL BLADELESS TROCARS WITH STABILITY SLEEVES: Brand: ENDOPATH XCEL

## (undated) DEVICE — DRP C/ARM 41X74IN

## (undated) DEVICE — APPL CHLORAPREP HI/LITE 26ML ORNG

## (undated) DEVICE — 2, DISPOSABLE SUCTION/IRRIGATOR WITH DISPOSABLE TIP: Brand: STRYKEFLOW

## (undated) DEVICE — SYS PERFUS SEP PLATLT W TIPS CUST

## (undated) DEVICE — GLV SURG BIOGEL LTX PF 6 1/2

## (undated) DEVICE — NDL HYPO PRECISIONGLIDE REG 20G 1 1/2

## (undated) DEVICE — 3M™ STERI-DRAPE™ INSTRUMENT POUCH 1018L: Brand: STERI-DRAPE™

## (undated) DEVICE — 3M™ IOBAN™ 2 ANTIMICROBIAL INCISE DRAPE 6640EZ: Brand: IOBAN™ 2

## (undated) DEVICE — BLADELESS OBTURATOR: Brand: WECK VISTA

## (undated) DEVICE — ADHS SKIN SURG TISS VISC PREMIERPRO EXOFIN HI/VISC FAST/DRY

## (undated) DEVICE — PATIENT RETURN ELECTRODE, SINGLE-USE, CONTACT QUALITY MONITORING, ADULT, WITH 9FT CORD, FOR PATIENTS WEIGING OVER 33LBS. (15KG): Brand: MEGADYNE

## (undated) DEVICE — SUT SILK 2/0 SH CR5 18IN C0125

## (undated) DEVICE — UNIVERSAL PACK: Brand: CARDINAL HEALTH

## (undated) DEVICE — PAD,ABDOMINAL,8"X7.5",STERILE,LF,1/PK: Brand: MEDLINE

## (undated) DEVICE — LOU THORACIC: Brand: MEDLINE INDUSTRIES, INC.

## (undated) DEVICE — KT CLN CLEANOR SCPE

## (undated) DEVICE — SOL ANTISTICK CAUTRY ELECTROLUBE LF

## (undated) DEVICE — SEAL

## (undated) DEVICE — MARKR SKIN W/RULR 2TP

## (undated) DEVICE — LOU MINOR PROCEDURE: Brand: MEDLINE INDUSTRIES, INC.

## (undated) DEVICE — SUT SILK 0 TIES 30IN A306H

## (undated) DEVICE — RADIFOCUS GLIDEWIRE: Brand: GLIDEWIRE

## (undated) DEVICE — SOL NACL 0.9PCT 1000ML

## (undated) DEVICE — REDUCER: Brand: ENDOWRIST

## (undated) DEVICE — EPIDURAL TRAY: Brand: MEDLINE INDUSTRIES, INC.

## (undated) DEVICE — TRAP FLD MINIVAC MEGADYNE 100ML

## (undated) DEVICE — ELECTRD BLD EZ CLN MOD 6.5IN

## (undated) DEVICE — SPNG LAP CIGARETTE KITTNER 5MM STRL PK/5

## (undated) DEVICE — TBG INSUFFLATION LUER LOCK: Brand: MEDLINE INDUSTRIES, INC.

## (undated) DEVICE — SUT SILK 0 PSL 18IN 580H

## (undated) DEVICE — GLV SURG SENSICARE PI MIC PF SZ8 LF STRL